# Patient Record
Sex: MALE | Race: WHITE | NOT HISPANIC OR LATINO | Employment: OTHER | URBAN - METROPOLITAN AREA
[De-identification: names, ages, dates, MRNs, and addresses within clinical notes are randomized per-mention and may not be internally consistent; named-entity substitution may affect disease eponyms.]

---

## 2024-07-09 ENCOUNTER — APPOINTMENT (OUTPATIENT)
Dept: ULTRASOUND IMAGING | Facility: CLINIC | Age: 64
End: 2024-07-09
Attending: EMERGENCY MEDICINE
Payer: MEDICARE

## 2024-07-09 ENCOUNTER — HOSPITAL ENCOUNTER (OUTPATIENT)
Facility: CLINIC | Age: 64
Setting detail: OBSERVATION
Discharge: HOME | End: 2024-07-11
Attending: EMERGENCY MEDICINE | Admitting: INTERNAL MEDICINE
Payer: MEDICARE

## 2024-07-09 ENCOUNTER — APPOINTMENT (OUTPATIENT)
Dept: MRI IMAGING | Facility: CLINIC | Age: 64
End: 2024-07-09
Attending: PHYSICIAN ASSISTANT
Payer: MEDICARE

## 2024-07-09 ENCOUNTER — APPOINTMENT (OUTPATIENT)
Dept: GENERAL RADIOLOGY | Facility: CLINIC | Age: 64
End: 2024-07-09
Attending: EMERGENCY MEDICINE
Payer: MEDICARE

## 2024-07-09 DIAGNOSIS — E11.69 TYPE 2 DIABETES MELLITUS WITH OSTEOMYELITIS (H): Primary | ICD-10-CM

## 2024-07-09 DIAGNOSIS — N18.6 TYPE 2 DIABETES MELLITUS WITH ESRD (END-STAGE RENAL DISEASE) (H): ICD-10-CM

## 2024-07-09 DIAGNOSIS — M86.9 OSTEOMYELITIS OF LEFT FOOT, UNSPECIFIED TYPE (H): ICD-10-CM

## 2024-07-09 DIAGNOSIS — Z99.2 DEPENDENCE ON RENAL DIALYSIS (H): ICD-10-CM

## 2024-07-09 DIAGNOSIS — L97.529 DIABETIC ULCER OF TOE OF LEFT FOOT ASSOCIATED WITH TYPE 2 DIABETES MELLITUS, UNSPECIFIED ULCER STAGE (H): ICD-10-CM

## 2024-07-09 DIAGNOSIS — E11.621 DIABETIC ULCER OF TOE OF LEFT FOOT ASSOCIATED WITH TYPE 2 DIABETES MELLITUS, UNSPECIFIED ULCER STAGE (H): ICD-10-CM

## 2024-07-09 DIAGNOSIS — M86.9 TYPE 2 DIABETES MELLITUS WITH OSTEOMYELITIS (H): Primary | ICD-10-CM

## 2024-07-09 DIAGNOSIS — E11.22 TYPE 2 DIABETES MELLITUS WITH ESRD (END-STAGE RENAL DISEASE) (H): ICD-10-CM

## 2024-07-09 LAB
ANION GAP SERPL CALCULATED.3IONS-SCNC: 13 MMOL/L (ref 7–15)
BASOPHILS # BLD AUTO: 0 10E3/UL (ref 0–0.2)
BASOPHILS NFR BLD AUTO: 0 %
BUN SERPL-MCNC: 47.1 MG/DL (ref 8–23)
CALCIUM SERPL-MCNC: 8 MG/DL (ref 8.8–10.2)
CHLORIDE SERPL-SCNC: 97 MMOL/L (ref 98–107)
CREAT SERPL-MCNC: 7.62 MG/DL (ref 0.67–1.17)
CRP SERPL-MCNC: 18.8 MG/L
DEPRECATED HCO3 PLAS-SCNC: 26 MMOL/L (ref 22–29)
EGFRCR SERPLBLD CKD-EPI 2021: 7 ML/MIN/1.73M2
EOSINOPHIL # BLD AUTO: 0.7 10E3/UL (ref 0–0.7)
EOSINOPHIL NFR BLD AUTO: 9 %
ERYTHROCYTE [DISTWIDTH] IN BLOOD BY AUTOMATED COUNT: 15.1 % (ref 10–15)
ERYTHROCYTE [SEDIMENTATION RATE] IN BLOOD BY WESTERGREN METHOD: 58 MM/HR (ref 0–20)
GLUCOSE BLDC GLUCOMTR-MCNC: 100 MG/DL (ref 70–99)
GLUCOSE BLDC GLUCOMTR-MCNC: 106 MG/DL (ref 70–99)
GLUCOSE BLDC GLUCOMTR-MCNC: 134 MG/DL (ref 70–99)
GLUCOSE BLDC GLUCOMTR-MCNC: 171 MG/DL (ref 70–99)
GLUCOSE BLDC GLUCOMTR-MCNC: 55 MG/DL (ref 70–99)
GLUCOSE BLDC GLUCOMTR-MCNC: 56 MG/DL (ref 70–99)
GLUCOSE BLDC GLUCOMTR-MCNC: 62 MG/DL (ref 70–99)
GLUCOSE BLDC GLUCOMTR-MCNC: 64 MG/DL (ref 70–99)
GLUCOSE SERPL-MCNC: 55 MG/DL (ref 70–99)
HBA1C MFR BLD: 5.9 %
HCT VFR BLD AUTO: 36.2 % (ref 40–53)
HGB BLD-MCNC: 12.3 G/DL (ref 13.3–17.7)
HOLD SPECIMEN: NORMAL
IMM GRANULOCYTES # BLD: 0 10E3/UL
IMM GRANULOCYTES NFR BLD: 0 %
INR PPP: 3.29 (ref 0.85–1.15)
LYMPHOCYTES # BLD AUTO: 0.9 10E3/UL (ref 0.8–5.3)
LYMPHOCYTES NFR BLD AUTO: 13 %
MCH RBC QN AUTO: 33.6 PG (ref 26.5–33)
MCHC RBC AUTO-ENTMCNC: 34 G/DL (ref 31.5–36.5)
MCV RBC AUTO: 99 FL (ref 78–100)
MONOCYTES # BLD AUTO: 0.6 10E3/UL (ref 0–1.3)
MONOCYTES NFR BLD AUTO: 9 %
NEUTROPHILS # BLD AUTO: 4.8 10E3/UL (ref 1.6–8.3)
NEUTROPHILS NFR BLD AUTO: 69 %
NRBC # BLD AUTO: 0 10E3/UL
NRBC BLD AUTO-RTO: 0 /100
PLATELET # BLD AUTO: 256 10E3/UL (ref 150–450)
POTASSIUM SERPL-SCNC: 4.5 MMOL/L (ref 3.4–5.3)
RBC # BLD AUTO: 3.66 10E6/UL (ref 4.4–5.9)
SODIUM SERPL-SCNC: 136 MMOL/L (ref 135–145)
WBC # BLD AUTO: 7 10E3/UL (ref 4–11)

## 2024-07-09 PROCEDURE — 85652 RBC SED RATE AUTOMATED: CPT | Performed by: EMERGENCY MEDICINE

## 2024-07-09 PROCEDURE — 96376 TX/PRO/DX INJ SAME DRUG ADON: CPT

## 2024-07-09 PROCEDURE — 36415 COLL VENOUS BLD VENIPUNCTURE: CPT | Performed by: PHYSICIAN ASSISTANT

## 2024-07-09 PROCEDURE — 82962 GLUCOSE BLOOD TEST: CPT

## 2024-07-09 PROCEDURE — 999N000090 HC STATISTIC LEVEL 2 EST PATIENT

## 2024-07-09 PROCEDURE — 258N000003 HC RX IP 258 OP 636: Performed by: EMERGENCY MEDICINE

## 2024-07-09 PROCEDURE — G0257 UNSCHED DIALYSIS ESRD PT HOS: HCPCS

## 2024-07-09 PROCEDURE — 96375 TX/PRO/DX INJ NEW DRUG ADDON: CPT | Mod: 59 | Performed by: EMERGENCY MEDICINE

## 2024-07-09 PROCEDURE — 93926 LOWER EXTREMITY STUDY: CPT | Mod: LT

## 2024-07-09 PROCEDURE — 73630 X-RAY EXAM OF FOOT: CPT | Mod: 26 | Performed by: RADIOLOGY

## 2024-07-09 PROCEDURE — 87040 BLOOD CULTURE FOR BACTERIA: CPT | Performed by: EMERGENCY MEDICINE

## 2024-07-09 PROCEDURE — 120N000002 HC R&B MED SURG/OB UMMC

## 2024-07-09 PROCEDURE — 96361 HYDRATE IV INFUSION ADD-ON: CPT | Performed by: EMERGENCY MEDICINE

## 2024-07-09 PROCEDURE — 96366 THER/PROPH/DIAG IV INF ADDON: CPT

## 2024-07-09 PROCEDURE — 250N000011 HC RX IP 250 OP 636: Performed by: INTERNAL MEDICINE

## 2024-07-09 PROCEDURE — 86140 C-REACTIVE PROTEIN: CPT | Performed by: EMERGENCY MEDICINE

## 2024-07-09 PROCEDURE — 258N000001 HC RX 258: Performed by: PHYSICIAN ASSISTANT

## 2024-07-09 PROCEDURE — 250N000011 HC RX IP 250 OP 636: Performed by: PHYSICIAN ASSISTANT

## 2024-07-09 PROCEDURE — 85025 COMPLETE CBC W/AUTO DIFF WBC: CPT | Performed by: EMERGENCY MEDICINE

## 2024-07-09 PROCEDURE — 87040 BLOOD CULTURE FOR BACTERIA: CPT | Performed by: PHYSICIAN ASSISTANT

## 2024-07-09 PROCEDURE — 258N000003 HC RX IP 258 OP 636: Performed by: PHYSICIAN ASSISTANT

## 2024-07-09 PROCEDURE — 99285 EMERGENCY DEPT VISIT HI MDM: CPT | Performed by: EMERGENCY MEDICINE

## 2024-07-09 PROCEDURE — 80048 BASIC METABOLIC PNL TOTAL CA: CPT | Performed by: EMERGENCY MEDICINE

## 2024-07-09 PROCEDURE — 272N000004 HC RX 272

## 2024-07-09 PROCEDURE — 96365 THER/PROPH/DIAG IV INF INIT: CPT | Mod: 59

## 2024-07-09 PROCEDURE — 96376 TX/PRO/DX INJ SAME DRUG ADON: CPT | Mod: 59 | Performed by: EMERGENCY MEDICINE

## 2024-07-09 PROCEDURE — 99223 1ST HOSP IP/OBS HIGH 75: CPT | Performed by: PHYSICIAN ASSISTANT

## 2024-07-09 PROCEDURE — 73630 X-RAY EXAM OF FOOT: CPT | Mod: LT

## 2024-07-09 PROCEDURE — 96368 THER/DIAG CONCURRENT INF: CPT

## 2024-07-09 PROCEDURE — 85610 PROTHROMBIN TIME: CPT | Performed by: PHYSICIAN ASSISTANT

## 2024-07-09 PROCEDURE — 93926 LOWER EXTREMITY STUDY: CPT | Mod: 26 | Performed by: RADIOLOGY

## 2024-07-09 PROCEDURE — 96374 THER/PROPH/DIAG INJ IV PUSH: CPT | Performed by: EMERGENCY MEDICINE

## 2024-07-09 PROCEDURE — 99223 1ST HOSP IP/OBS HIGH 75: CPT | Mod: AI | Performed by: INTERNAL MEDICINE

## 2024-07-09 PROCEDURE — 36415 COLL VENOUS BLD VENIPUNCTURE: CPT | Performed by: EMERGENCY MEDICINE

## 2024-07-09 PROCEDURE — 258N000001 HC RX 258: Performed by: EMERGENCY MEDICINE

## 2024-07-09 PROCEDURE — 83036 HEMOGLOBIN GLYCOSYLATED A1C: CPT | Performed by: PHYSICIAN ASSISTANT

## 2024-07-09 PROCEDURE — 250N000013 HC RX MED GY IP 250 OP 250 PS 637: Performed by: PHYSICIAN ASSISTANT

## 2024-07-09 PROCEDURE — 99285 EMERGENCY DEPT VISIT HI MDM: CPT | Mod: 25 | Performed by: EMERGENCY MEDICINE

## 2024-07-09 PROCEDURE — 250N000011 HC RX IP 250 OP 636: Performed by: EMERGENCY MEDICINE

## 2024-07-09 RX ORDER — PANTOPRAZOLE SODIUM 40 MG/1
40 TABLET, DELAYED RELEASE ORAL
Status: DISCONTINUED | OUTPATIENT
Start: 2024-07-10 | End: 2024-07-11 | Stop reason: HOSPADM

## 2024-07-09 RX ORDER — CARVEDILOL 3.12 MG/1
12.5 TABLET ORAL 2 TIMES DAILY WITH MEALS
Status: DISCONTINUED | OUTPATIENT
Start: 2024-07-09 | End: 2024-07-11 | Stop reason: HOSPADM

## 2024-07-09 RX ORDER — WARFARIN SODIUM 1 MG/1
5 TABLET ORAL
COMMUNITY
Start: 2024-05-15

## 2024-07-09 RX ORDER — CINACALCET 30 MG/1
30 TABLET, FILM COATED ORAL
COMMUNITY
Start: 2023-02-15 | End: 2024-07-09

## 2024-07-09 RX ORDER — ATORVASTATIN CALCIUM 40 MG/1
40 TABLET, FILM COATED ORAL DAILY
COMMUNITY
Start: 2023-01-04

## 2024-07-09 RX ORDER — TORSEMIDE 100 MG/1
100 TABLET ORAL EVERY MORNING
COMMUNITY
Start: 2022-10-18

## 2024-07-09 RX ORDER — LIDOCAINE 40 MG/G
CREAM TOPICAL
Status: DISCONTINUED | OUTPATIENT
Start: 2024-07-09 | End: 2024-07-11 | Stop reason: HOSPADM

## 2024-07-09 RX ORDER — TORSEMIDE 100 MG/1
100 TABLET ORAL EVERY MORNING
Status: DISCONTINUED | OUTPATIENT
Start: 2024-07-09 | End: 2024-07-11 | Stop reason: HOSPADM

## 2024-07-09 RX ORDER — CALCITRIOL 0.25 UG/1
0.25 CAPSULE, LIQUID FILLED ORAL
Status: DISCONTINUED | OUTPATIENT
Start: 2024-07-10 | End: 2024-07-11 | Stop reason: HOSPADM

## 2024-07-09 RX ORDER — DEXTROSE MONOHYDRATE 25 G/50ML
25 INJECTION, SOLUTION INTRAVENOUS ONCE
Status: COMPLETED | OUTPATIENT
Start: 2024-07-09 | End: 2024-07-09

## 2024-07-09 RX ORDER — GENTAMICIN SULFATE 1 MG/G
CREAM TOPICAL DAILY
COMMUNITY
Start: 2023-11-28

## 2024-07-09 RX ORDER — ATORVASTATIN CALCIUM 40 MG/1
40 TABLET, FILM COATED ORAL DAILY
Status: DISCONTINUED | OUTPATIENT
Start: 2024-07-10 | End: 2024-07-11 | Stop reason: HOSPADM

## 2024-07-09 RX ORDER — AMOXICILLIN 250 MG
2 CAPSULE ORAL 2 TIMES DAILY PRN
Status: DISCONTINUED | OUTPATIENT
Start: 2024-07-09 | End: 2024-07-11 | Stop reason: HOSPADM

## 2024-07-09 RX ORDER — GENTAMICIN SULFATE 1 MG/G
CREAM TOPICAL
Status: DISCONTINUED | OUTPATIENT
Start: 2024-07-09 | End: 2024-07-11 | Stop reason: HOSPADM

## 2024-07-09 RX ORDER — GENTAMICIN SULFATE 1 MG/G
CREAM TOPICAL DAILY PRN
Status: DISCONTINUED | OUTPATIENT
Start: 2024-07-09 | End: 2024-07-10

## 2024-07-09 RX ORDER — DEXTROSE MONOHYDRATE 25 G/50ML
25-50 INJECTION, SOLUTION INTRAVENOUS
Status: DISCONTINUED | OUTPATIENT
Start: 2024-07-09 | End: 2024-07-11 | Stop reason: HOSPADM

## 2024-07-09 RX ORDER — PANTOPRAZOLE SODIUM 40 MG/1
40 TABLET, DELAYED RELEASE ORAL DAILY
COMMUNITY

## 2024-07-09 RX ORDER — NICOTINE POLACRILEX 4 MG
15-30 LOZENGE BUCCAL
Status: DISCONTINUED | OUTPATIENT
Start: 2024-07-09 | End: 2024-07-11 | Stop reason: HOSPADM

## 2024-07-09 RX ORDER — CINACALCET 30 MG/1
60 TABLET, FILM COATED ORAL DAILY
Status: ON HOLD | COMMUNITY
Start: 2023-02-14 | End: 2024-07-10

## 2024-07-09 RX ORDER — ALLOPURINOL 100 MG/1
200 TABLET ORAL EVERY EVENING
COMMUNITY
Start: 2023-03-01

## 2024-07-09 RX ORDER — ACETAMINOPHEN 325 MG/1
325-650 TABLET ORAL EVERY 4 HOURS PRN
COMMUNITY

## 2024-07-09 RX ORDER — CALCIUM CARBONATE 500 MG/1
1000 TABLET, CHEWABLE ORAL 4 TIMES DAILY PRN
Status: DISCONTINUED | OUTPATIENT
Start: 2024-07-09 | End: 2024-07-11 | Stop reason: HOSPADM

## 2024-07-09 RX ORDER — ALLOPURINOL 100 MG/1
200 TABLET ORAL EVERY EVENING
Status: DISCONTINUED | OUTPATIENT
Start: 2024-07-09 | End: 2024-07-11 | Stop reason: HOSPADM

## 2024-07-09 RX ORDER — CALCITRIOL 0.25 UG/1
0.25 CAPSULE, LIQUID FILLED ORAL
COMMUNITY
Start: 2024-02-27

## 2024-07-09 RX ORDER — CLOPIDOGREL BISULFATE 75 MG
75 TABLET ORAL EVERY EVENING
COMMUNITY
Start: 2024-05-12

## 2024-07-09 RX ORDER — AMOXICILLIN 250 MG
1 CAPSULE ORAL 2 TIMES DAILY PRN
Status: DISCONTINUED | OUTPATIENT
Start: 2024-07-09 | End: 2024-07-11 | Stop reason: HOSPADM

## 2024-07-09 RX ORDER — NICOTINE POLACRILEX 4 MG
15-30 LOZENGE BUCCAL
Status: DISCONTINUED | OUTPATIENT
Start: 2024-07-09 | End: 2024-07-09

## 2024-07-09 RX ORDER — ICODEXTRIN, SODIUM CHLORIDE, SODIUM LACTATE, CALCIUM CHLORIDE, MAGNESIUM CHLORIDE 7.5; 535; 448; 25.7; 5.08 G/100ML; MG/100ML; MG/100ML; MG/100ML; MG/100ML
1200 INJECTION, SOLUTION INTRAPERITONEAL
Status: DISCONTINUED | OUTPATIENT
Start: 2024-07-09 | End: 2024-07-10

## 2024-07-09 RX ORDER — DEXTROSE MONOHYDRATE 25 G/50ML
25-50 INJECTION, SOLUTION INTRAVENOUS
Status: DISCONTINUED | OUTPATIENT
Start: 2024-07-09 | End: 2024-07-09

## 2024-07-09 RX ORDER — PIPERACILLIN SODIUM, TAZOBACTAM SODIUM 2; .25 G/10ML; G/10ML
2.25 INJECTION, POWDER, LYOPHILIZED, FOR SOLUTION INTRAVENOUS ONCE
Status: DISCONTINUED | OUTPATIENT
Start: 2024-07-09 | End: 2024-07-09

## 2024-07-09 RX ORDER — DEXTROSE MONOHYDRATE 25 G/50ML
INJECTION, SOLUTION INTRAVENOUS
Status: DISCONTINUED
Start: 2024-07-09 | End: 2024-07-09 | Stop reason: HOSPADM

## 2024-07-09 RX ORDER — SEVELAMER CARBONATE 800 MG/1
800 TABLET, FILM COATED ORAL
Status: DISCONTINUED | OUTPATIENT
Start: 2024-07-09 | End: 2024-07-11 | Stop reason: HOSPADM

## 2024-07-09 RX ORDER — CARVEDILOL 25 MG/1
12.5 TABLET ORAL 2 TIMES DAILY WITH MEALS
COMMUNITY
Start: 2023-09-11

## 2024-07-09 RX ORDER — CINACALCET 30 MG/1
30 TABLET, FILM COATED ORAL DAILY
Status: DISCONTINUED | OUTPATIENT
Start: 2024-07-09 | End: 2024-07-10

## 2024-07-09 RX ORDER — PIPERACILLIN SODIUM, TAZOBACTAM SODIUM 2; .25 G/10ML; G/10ML
2.25 INJECTION, POWDER, LYOPHILIZED, FOR SOLUTION INTRAVENOUS EVERY 8 HOURS
Status: DISCONTINUED | OUTPATIENT
Start: 2024-07-09 | End: 2024-07-10

## 2024-07-09 RX ORDER — ACETAMINOPHEN 325 MG/1
650 TABLET ORAL EVERY 4 HOURS PRN
Status: DISCONTINUED | OUTPATIENT
Start: 2024-07-09 | End: 2024-07-11 | Stop reason: HOSPADM

## 2024-07-09 RX ORDER — CLOPIDOGREL BISULFATE 75 MG/1
75 TABLET ORAL EVERY EVENING
Status: DISCONTINUED | OUTPATIENT
Start: 2024-07-09 | End: 2024-07-11 | Stop reason: HOSPADM

## 2024-07-09 RX ORDER — SACUBITRIL AND VALSARTAN 49; 51 MG/1; MG/1
1 TABLET, FILM COATED ORAL 2 TIMES DAILY
COMMUNITY
Start: 2023-03-16

## 2024-07-09 RX ADMIN — PIPERACILLIN AND TAZOBACTAM 2.25 G: 2; .25 INJECTION, POWDER, LYOPHILIZED, FOR SOLUTION INTRAVENOUS; PARENTERAL at 13:11

## 2024-07-09 RX ADMIN — ALLOPURINOL 200 MG: 100 TABLET ORAL at 20:10

## 2024-07-09 RX ADMIN — SEVELAMER CARBONATE 800 MG: 800 TABLET, FILM COATED ORAL at 15:30

## 2024-07-09 RX ADMIN — VANCOMYCIN HYDROCHLORIDE 1500 MG: 10 INJECTION, POWDER, LYOPHILIZED, FOR SOLUTION INTRAVENOUS at 13:33

## 2024-07-09 RX ADMIN — ICODEXTRIN, SODIUM CHLORIDE, SODIUM LACTATE, CALCIUM CHLORIDE, MAGNESIUM CHLORIDE 1200 ML: 7.5; 535; 448; 25.7; 5.08 INJECTION, SOLUTION INTRAPERITONEAL at 22:00

## 2024-07-09 RX ADMIN — DEXTROSE MONOHYDRATE 25 ML: 25 INJECTION, SOLUTION INTRAVENOUS at 09:19

## 2024-07-09 RX ADMIN — TORSEMIDE 100 MG: 100 TABLET ORAL at 15:29

## 2024-07-09 RX ADMIN — SODIUM CHLORIDE, SODIUM LACTATE, CALCIUM CHLORIDE, MAGNESIUM CHLORIDE AND DEXTROSE: 2.5; 538; 448; 18.3; 5.08 INJECTION, SOLUTION INTRAPERITONEAL at 21:58

## 2024-07-09 RX ADMIN — SODIUM CHLORIDE 500 ML: 9 INJECTION, SOLUTION INTRAVENOUS at 09:56

## 2024-07-09 RX ADMIN — CINACALCET 30 MG: 30 TABLET, FILM COATED ORAL at 15:30

## 2024-07-09 RX ADMIN — DEXTROSE MONOHYDRATE 25 ML: 25 INJECTION, SOLUTION INTRAVENOUS at 22:47

## 2024-07-09 RX ADMIN — PIPERACILLIN AND TAZOBACTAM 2.25 G: 2; .25 INJECTION, POWDER, FOR SOLUTION INTRAVENOUS at 20:14

## 2024-07-09 RX ADMIN — SODIUM CHLORIDE, SODIUM LACTATE, CALCIUM CHLORIDE, MAGNESIUM CHLORIDE AND DEXTROSE: 2.5; 538; 448; 18.3; 5.08 INJECTION, SOLUTION INTRAPERITONEAL at 22:00

## 2024-07-09 RX ADMIN — SACUBITRIL AND VALSARTAN 1 TABLET: 49; 51 TABLET, FILM COATED ORAL at 20:22

## 2024-07-09 RX ADMIN — SACUBITRIL AND VALSARTAN 1 TABLET: 49; 51 TABLET, FILM COATED ORAL at 15:29

## 2024-07-09 RX ADMIN — DEXTROSE MONOHYDRATE 25 ML: 25 INJECTION, SOLUTION INTRAVENOUS at 11:55

## 2024-07-09 RX ADMIN — CLOPIDOGREL BISULFATE 75 MG: 75 TABLET ORAL at 20:10

## 2024-07-09 RX ADMIN — CARVEDILOL 12.5 MG: 12.5 TABLET, FILM COATED ORAL at 15:29

## 2024-07-09 ASSESSMENT — ACTIVITIES OF DAILY LIVING (ADL)
ADLS_ACUITY_SCORE: 36
ADLS_ACUITY_SCORE: 36
ADLS_ACUITY_SCORE: 33
ADLS_ACUITY_SCORE: 36
ADLS_ACUITY_SCORE: 35
ADLS_ACUITY_SCORE: 35
ADLS_ACUITY_SCORE: 36
ADLS_ACUITY_SCORE: 35
ADLS_ACUITY_SCORE: 36
ADLS_ACUITY_SCORE: 36
ADLS_ACUITY_SCORE: 35
ADLS_ACUITY_SCORE: 35
ADLS_ACUITY_SCORE: 36
ADLS_ACUITY_SCORE: 36
ADLS_ACUITY_SCORE: 35

## 2024-07-09 ASSESSMENT — COLUMBIA-SUICIDE SEVERITY RATING SCALE - C-SSRS
1. IN THE PAST MONTH, HAVE YOU WISHED YOU WERE DEAD OR WISHED YOU COULD GO TO SLEEP AND NOT WAKE UP?: NO
2. HAVE YOU ACTUALLY HAD ANY THOUGHTS OF KILLING YOURSELF IN THE PAST MONTH?: NO
6. HAVE YOU EVER DONE ANYTHING, STARTED TO DO ANYTHING, OR PREPARED TO DO ANYTHING TO END YOUR LIFE?: NO

## 2024-07-09 NOTE — MEDICATION SCRIBE - ADMISSION MEDICATION HISTORY
Medication Scribe Admission Medication History    Admission medication history is complete. The information provided in this note is only as accurate as the sources available at the time of the update.    Information Source(s): Clinic records and CareEverywhere/SureScripts via in-person    Pertinent Information:   Patient had no medications listed in the [employer] system. So writer built PTA list form information found in Chart Review Medication Section Current Meds Only section. This information was complied by prescribing NILSA after physical evaluation interview.    Patient is prescribed warfarin 5 mg Instructions are- 5 mg every Mom Wed Fri and 7.5 mg all other days. Weekly warfarin total 45 mg.  INR used for dosing 2.7 (7/8/2024) Next INR check is 7/22/2024. This information is from Chart Review (Encounters 07/08/2024) Telephone note, Anticoagulation Clinic.     The below information is from Care Everywhere Outside Records Activity medication was ordered 07/09/2024    Patient has been prescribed   insulin aspart (NOVOLOG PEN) 100 UNIT/ML pen     Inject 1-7 Units subcutaneously 3 times daily (before meals)   1-7 Units, Subcutaneous, 3 TIMES DAILY BEFORE MEALS, First dose on Tue 7/9/24 at 1350  Correction Scale - MEDIUM INSULIN RESISTANCE DOSING Do Not give Correction Insulin if Pre-Meal BG less than 140. For Pre-Meal  - 189 give 1 unit. For Pre-Meal  - 239 give 2 units. For Pre-Meal  - 289 give 3 units. For Pre-Meal  - 339 give 4 units. For Pre-Meal - 389 give 5 units. For Pre-Meal -439 give 6 units For Pre-Meal BG greater than or equal to 440 give 7 units. To be given with prandial insulin, and based on pre-meal blood glucose. Administering insulin within 5 minutes of the start of the meal is ideal. Administer insulin no more than 30 minutes after the start of the meal, unless directed otherwise by provider. Notify provider if glucose greater than or equal to 350 mg/dL  after administration of correction dose.     Patient also prescribed  insulin aspart (NOVOLOG PEN) 100 UNIT/ML pen     Inject 1-5 Units subcutaneously at bedtime    1-5 Units, Subcutaneous, AT BEDTIME, First dose on Tue 7/9/24 at 2200  MEDIUM INSULIN RESISTANCE DOSING Do Not give Bedtime Correction Insulin if BG less than 200. For  - 249 give 1 units. For  - 299 give 2 units. For  - 349 give 3 units. For  -399 give 4 units. For BG greater than or equal to 400 give 5 units. Notify provider if glucose greater than or equal to 350 mg/dL after administration of correction dose.       Changes made to PTA medication list:  Added: Every medication on the PTA Med List below  has been added from other sources Chart Review, Care Everywhere, and Clinic Records.   Deleted:cinacalcet (SENSIPAR) 30 MG tablet     Take 30 mg by mouth every 48 hours duplicate prescription   Changed: None    Allergies reviewed with patient and updates made in EHR: yes    Medication History Completed By: Tia Dasilva 7/9/2024 5:22 PM    Prior to Admission medications    Medication Sig Last Dose Taking? Auth Provider Long Term End Date   acetaminophen (TYLENOL) 325 MG tablet Take 325-650 mg by mouth every 4 hours as needed Unknown at as needed Yes Reported, Patient     allopurinol (ZYLOPRIM) 100 MG tablet Take 200 mg by mouth every evening 7/8/2024 at evening Yes Reported, Patient     atorvastatin (LIPITOR) 40 MG tablet Take 40 mg by mouth daily 7/8/2024 at am Yes Reported, Patient     calcitRIOL (ROCALTROL) 0.25 MCG capsule Take 0.25 mcg by mouth Every Mon, Wed, Fri Morning 7/8/2024 at am Yes Reported, Patient     calcium carbonate antacid 1000 MG CHEW Take 1,000 mg by mouth 4 times daily as needed Unknown at as needed Yes Reported, Patient     carvedilol (COREG) 25 MG tablet Take 12.5 mg by mouth 2 times daily (with meals) 7/8/2024 at am/bebo Yes Reported, Patient     cinacalcet (SENSIPAR) 30 MG tablet Take 1 tablet by  mouth daily 7/8/2024 at am Yes Reported, Patient     gentamicin (GARAMYCIN) 0.1 % external cream Apply topically daily 7/8/2024 at am Yes Reported, Patient     insulin aspart (NOVOLOG PEN) 100 UNIT/ML pen Inject 1-7 Units subcutaneously 3 times daily (before meals) 7/8/2024 at am/pm/bebo Yes Reported, Patient     insulin aspart (NOVOLOG PEN) 100 UNIT/ML pen Inject 1-5 Units subcutaneously at bedtime 7/8/2024 at bedtime Yes Reported, Patient     melatonin 5 MG tablet Take 5 mg by mouth at bedtime 7/8/2024 at bedtime Yes Reported, Patient     pantoprazole (PROTONIX) 40 MG EC tablet Take 40 mg by mouth daily Every am before meals 7/8/2024 at am Yes Reported, Patient     PLAVIX 75 MG tablet Take 75 mg by mouth every evening 7/8/2024 at evening Yes Reported, Patient     sacubitril-valsartan (ENTRESTO) 49-51 MG per tablet Take 1 tablet by mouth 2 times daily 7/8/2024 at am/bebo Yes Reported, Patient     Sennosides-Docusate Sodium (SENOKOT S PO) Take 2 tablets by mouth 2 times daily as needed 7/8/2024 at am/bebo Yes Reported, Patient     SEVELAMER CARBONATE PO Take 800 mg by mouth 3 times daily (with meals) 7/8/2024 at am/pm/noc Yes Reported, Patient     torsemide (DEMADEX) 100 MG tablet Take 100 mg by mouth every morning 7/8/2024 at am Yes Reported, Patient     warfarin ANTICOAGULANT (COUMADIN) 1 MG tablet Take 5 mg by mouth Every Mon, Wed, Fri Morning 7.5 mg all other days 7/8/2024 at am/5 mg Yes Reported, Patient

## 2024-07-09 NOTE — PHARMACY-VANCOMYCIN DOSING SERVICE
"Pharmacy Vancomycin Initial Note  Date of Service 2024  Patient's  1960  63 year old, male    Indication: Osteomyelitis    Current estimated CrCl = CrCl cannot be calculated (Unknown ideal weight.).    Creatinine for last 3 days  2024:  9:14 AM Creatinine 7.62 mg/dL    Recent Vancomycin Level(s) for last 3 days  No results found for requested labs within last 3 days.      Vancomycin IV Administrations (past 72 hours)        No vancomycin orders with administrations in past 72 hours.                    Nephrotoxins and other renal medications (From now, onward)      Start     Dose/Rate Route Frequency Ordered Stop    24 1255  vancomycin (VANCOCIN) 1,500 mg in 0.9% NaCl 250 mL intermittent infusion         1,500 mg  over 90 Minutes Intravenous ONCE 24 1253      24 1240  piperacillin-tazobactam (ZOSYN) 2.25 g vial to attach to  ml bag        Note to Pharmacy: For [facility], [facility] and [facility]: For Zosyn-naive patients, use the \"Zosyn initial dose + extended infusion\" order panel.    2.25 g  over 30 Minutes Intravenous ONCE 24 1237              Contrast Orders - past 72 hours (72h ago, onward)      None            Plan:  Start vancomycin  1500 mg IV once and will dose future doses intermittently based on levels.  Vancomycin monitoring method: Will draw random level in 2 days to assess supplemental dosing because patient utilizes peritoneal dialysis   Vancomycin therapeutic monitoring goal: 15-20 mg/L  Pharmacy will check vancomycin levels as appropriate in 2 days.    Serum creatinine levels will be ordered  N/A. Patient is receiving peritoneal dialysis. Will review for residual urine output .      Jesse Gerard, PharmD   "

## 2024-07-09 NOTE — ED TRIAGE NOTES
Pt arrives ambulatory with cane c/o L toe pain. L greater toe appears to have slight necrosis and bleeding. L toes 2-5 are reddened, warm to touch with minimal sensation. Pain is 3/10 to affected foot.     Hx of emergency vascular surgery to RLE 5/6 but didn't find it necessary to operate on patients LLE at that time. Pt was started warfarin at that time d/t 6 months of eliquis noncompliance related to costs of med.     Triage Assessment (Adult)       Row Name 07/09/24 0845          Triage Assessment    Airway WDL WDL        Respiratory WDL    Respiratory WDL WDL        Skin Circulation/Temperature WDL    Skin Circulation/Temperature WDL WDL        Cardiac WDL    Cardiac WDL X     Cardiac Rhythm ST        Peripheral/Neurovascular WDL    Peripheral Neurovascular WDL X;capillary refill     Capillary Refill, LLE greater than 3 secs        Cognitive/Neuro/Behavioral WDL    Cognitive/Neuro/Behavioral WDL WDL

## 2024-07-09 NOTE — CONSULTS
Nephrology Initial Consult  July 9, 2024      Arnulfo Pritchett MRN:[id number] YOB: 1960  Date of Admission:7/9/2024  Primary care provider: No Ref-Primary, Physician  Requesting physician: Pj Johnson MD    ASSESSMENT AND RECOMMENDATIONS:   Arnulfo Pritchett is a 64 yo male with  PMH significant for GERD, ESRD on PD, HFrEF, IDDM, DM peripheral neuropathy, CAD s/p stent placement x 3 (last 2018), TALI, HLD, and RLE PAD s/p balloon angio and stenting of SFA/popliteal arteries 5/2024, admitted to [facility] after presenting to ED with 1 week hx or worsening L great toe wound with imaging concerning for OM. Nephrology consulted for assistance in the management of peritoneal dialysis.    # ESRD   On HD initially for  two and half year and transitioned to PD since 11/23. Followed at [facility].  Home prescription : Four cycle with FV 2500ml , 2.5% PD solution  over 9 hours. FF with icodextran 1200 ml. She does drain the FF at 1 pm manually. Reported KT/V of 2.7 recently.  - Continue PD tonight with home prescription  - [place] PD catheter at all time  - Apply gentamicin ointment alto the exit site daily   - Nystatin 500,000 unit po four times a day for prevention of fungal peritonitis till seven days after completion of systemic antibiotic    # Anemia of renal diseases  HB 12.3, in target . Iron - replete as of 6/3/2024.  - No need of ANSHUL therapy     # MBD  Ca - 9.2, Phosph - 6.4  and  PTH 1174 on 6/3/2024  - Continue PTA  Renvela 800mg po tid with meal   - Continue PTA  Sensipar 60 mg po daily  and Calcitriol 0.25 mcg po daily     # Concern of Osteomyelitis of the left great toe    - Started on broad spectrum abxs    - Agree with orthopedic and ID consultation.     Recommendations were communicated to primary team via note    Seen and discussed with Dr. Ksenia Seals MD  Division of Renal Disease and Hypertension  [facility]  ailyn  [email or web address] [employer]        REASON FOR CONSULT:  ESRD on Peritoneal dialysis    HISTORY OF PRESENT ILLNESS:  Admitting provider and nursing notes reviewed  Arnulfo Pritchett is a 64 yo male with  PMH significant for GERD, ESRD on PD, HFrEF, IDDM, DM peripheral neuropathy, CAD s/p stent placement x 3 (last 2018), TALI, HLD, and RLE PAD s/p balloon angio and stenting of SFA/popliteal arteries 5/2024, admitted to [facility] after presenting to ED with 1 week hx or worsening L great toe wound with imaging concerning for OM.   ESRD is due to DKD and parenchymal loss from right nephrectomy for RCC. Initially he was on HD for two and half year then transitioned to PD since 11/2023. No complications thus far related to PD including peritonitis. He has significant residual kidney function with urine volume averaging a liter per day.     PAST MEDICAL HISTORY:  Reviewed with patient on 07/09/2024     Past Medical History:   Diagnosis Date    CAD (coronary artery disease)     Chronic systolic heart failure (H)     ESRD (end stage renal disease) on dialysis (H)     Gastroesophageal reflux disease without esophagitis     Gout     HLD (hyperlipidemia)     TALI (obstructive sleep apnea)     PAD (peripheral artery disease) (H24)     S/p RLE stenting of SFA/popliteal arteries    Type 2 diabetes mellitus with diabetic neuropathy (H)        No past surgical history on file.     MEDICATIONS:  PTA Meds  Prior to Admission medications    Not on File      Current Meds  Current Facility-Administered Medications   Medication Dose Route Frequency Provider Last Rate Last Admin    allopurinol (ZYLOPRIM) tablet 200 mg  200 mg Oral QPM Jaylon Moyer PA-C        [START ON 7/10/2024] atorvastatin (LIPITOR) tablet 40 mg  40 mg Oral Daily Jaylon Moyer PA-C        [START ON 7/10/2024] calcitRIOL (ROCALTROL) capsule 0.25 mcg  0.25 mcg Oral Q Mon Wed Fri AM Jaylon Moyer PA-C        carvedilol (COREG) tablet 12.5 mg  12.5 mg Oral BID w/meals Jaylon Moyer PA-C         cinacalcet (SENSIPAR) tablet 30 mg  30 mg Oral Daily Jaylon Moyer PA-C        gentamicin (GARAMYCIN) 0.1 % cream   Topical Q48H Jaylon Moyer PA-C        insulin aspart (NovoLOG) injection (RAPID ACTING)  1-7 Units Subcutaneous TID AC Jaylon Moyer PA-C        insulin aspart (NovoLOG) injection (RAPID ACTING)  1-5 Units Subcutaneous At Bedtime Jaylon Moyer PA-C        melatonin tablet 5 mg  5 mg Oral At Bedtime Jaylon Moyer PA-C        [START ON 7/10/2024] pantoprazole (PROTONIX) EC tablet 40 mg  40 mg Oral QAM AC Jaylon Moyer PA-C        piperacillin-tazobactam (ZOSYN) 2.25 g vial to attach to  ml bag  2.25 g Intravenous Q8H Pj Johnson MD        sacubitril-valsartan (ENTRESTO) 49-51 MG per tablet 1 tablet  1 tablet Oral BID Jaylon Moyer PA-C        sevelamer carbonate (RENVELA) tablet 800 mg  800 mg Oral TID w/meals Jaylon Moyer PA-C        sodium chloride (PF) 0.9% PF flush 3 mL  3 mL Intracatheter Q8H Jaylon Moyer PA-C        torsemide (DEMADEX) tablet 100 mg  100 mg Oral QAM Jaylon Moyer PA-C        vancomycin (VANCOCIN) 1,500 mg in 0.9% NaCl 250 mL intermittent infusion  1,500 mg Intravenous Once Jaylon Moyer PA-C   1,500 mg at 07/09/24 1333    vancomycin place jin - receiving intermittent dosing  1 each Intravenous See Admin Instructions Jaylon Moyer PA-C        Warfarin Dose Required Daily - Pharmacist Managed  1 each Oral See Admin Instructions Jaylon Moyer PA-C         Infusion Meds  Current Facility-Administered Medications   Medication Dose Route Frequency Provider Last Rate Last Admin    Patient is already receiving anticoagulation with heparin, enoxaparin (LOVENOX), warfarin (COUMADIN)  or other anticoagulant medication   Does not apply Continuous PRN Jaylon Moyer PA-C           ALLERGIES:    No Known Allergies    REVIEW OF SYSTEMS:  A comprehensive  of systems was negative except as noted above.    SOCIAL HISTORY:   Social History     Socioeconomic History    Marital status:      Spouse name: Not on file    Number of children: Not on file    Years of education: Not on file    Highest education level: Not on file   Occupational History    Not on file   Tobacco Use    Smoking status: Not on file    Smokeless tobacco: Not on file   Substance and Sexual Activity    Alcohol use: Not on file    Drug use: Not on file    Sexual activity: Not on file   Other Topics Concern    Not on file   Social History Narrative    Not on file     Social Determinants of Health     Financial Resource Strain: Low Risk  (11/24/2023)    [facility]    Overall Financial Resource Strain (CARDIA)     Difficulty of Paying Living Expenses: Not very hard   Food Insecurity: No Food Insecurity (5/7/2024)    Received from [facility]    Hunger Vital Sign     Worried About Running Out of Food in the Last Year: Never true     Ran Out of Food in the Last Year: Never true   Transportation Needs: No Transportation Needs (5/7/2024)    Received from [facility]    PRAPARE - Transportation     Lack of Transportation (Medical): No     Lack of Transportation (Non-Medical): No   Physical Activity: Not on file   Stress: Not on file   Social Connections: Not on file   Interpersonal Safety: Not At Risk (5/7/2024)    Received from [facility]    EH IP Custom IPV     Do you feel UNSAFE in any of your personal relationships with your family members or any other acquaintances?: No   Housing Stability: Low Risk  (5/7/2024)    Received from [facility]    Housing Stability Vital Sign     Unable to Pay for Housing in the Last Year: No     Number of Times Moved in the Last Year:  0     Homeless in the Last Year: No     Reviewed with patient       FAMILY MEDICAL HISTORY:   No family history on file.  Reviewed with patient     PHYSICAL EXAM:   Temp  Av.6  F (36.4  C)  Min: 97.6  F (36.4  C)  Max: 97.6  F (36.4  C)      Pulse  Av  Min: 106  Max: 106 Resp  Av  Min: 14  Max: 14  SpO2  Av %  Min: 98 %  Max: 98 %       BP (!) 150/111   Pulse 106   Temp 97.6  F (36.4  C) (Oral)   Resp 14   SpO2 98%    Date 24 07 - 07/10/24 0659   Shift 3476-4165 5550-2384 [id number] 24 Hour Total   INTAKE   IV Piggyback 500   500   Shift Total 500   500   OUTPUT   Shift Total       Weight (kg)          Admit       GENERAL APPEARANCE: not distress,   EYES: no  scleral icterus, pupils equal  Lymphatics: no cervical or supraclavicular LAD  Pulmonary: lungs clear to auscultation with equal breath sounds bilaterally, no clubbing  CV: regular rhythm, normal rate, no rub   - JVD -ve   - Edema -ve  GI: soft, non tender. PD cath exit site and tunnel appears clean.  MS: no evidence of inflammation in joints, no muscle tenderness  : no borrego  SKIN: LLE great toe erythema. Small scattered black areas all toes.  NEURO: face symmetric    LABS:   CMP  Recent Labs   Lab 24  1216 24  1143 24  0947 24  0914   NA  --   --   --  136   POTASSIUM  --   --   --  4.5   CHLORIDE  --   --   --  97*   CO2  --   --   --  26   ANIONGAP  --   --   --  13   * 64* 100* 55*   BUN  --   --   --  47.1*   CR  --   --   --  7.62*   GFRESTIMATED  --   --   --  7*   TERENCE  --   --   --  8.0*     CBC  Recent Labs   Lab 24   HGB 12.3*   WBC 7.0   RBC 3.66*   HCT 36.2*   MCV 99   MCH 33.6*   MCHC 34.0   RDW 15.1*        INR  Recent Labs   Lab 24   INR 3.29*     ABGNo lab results found in last 7 days.   URINE STUDIES  No lab results found.  No lab results found.  PTH  No lab results found.  IRON STUDIES  No lab results found.    IMAGING:  All imaging studies  reviewed by me.     Cally Seals MD

## 2024-07-09 NOTE — PROGRESS NOTES
PD reached out to nurse regarding dialysis. Does not believe there is issue with his indwell solution being held longer then normal. Will let next nurse let PD know if or not or when he is admitted to [facility].

## 2024-07-09 NOTE — ED PROVIDER NOTES
ED Provider Note  [facility]      History     Chief Complaint   Patient presents with    Circulatory Problem     HPI  Arnulfo Pritchett is a 63 year old male with history of peripheral arterial disease, right lower extremity ischemia requiring angio and stenting 5/6/24, ESRD on PD, HFrEF, T2DM, HDL, CAD, TALI, and atrial fibrillation who presents accompanied by his son Ervin for evaluation of L great toe blackening. Present problem began approximately 1 week ago when the patient noticed that a small area of the tip of his L great toe along the nail was blackened. He then noticed yesterday that this area was larger and was then newly accompanied by some bleeding from the side of the nail. He notes that he regularly gets his toenails clipped professionally, though he missed a session recently. He is concerned that an ingrown toenail may be contributing to present problem. He was seen for this recently at the [facility], where he had an evaluation including plain film imaging that showed no acute fracture or evidence of osteomyelitis. He was told that his toe was necrotic and discharged with a course of Augmentin, though he has not yet had the antibiotic prescription filled. He also reports that he has had redness of his other L toes that has been present for several weeks. He has had no change in ability to ambulate independently.    Notable PMH includes peripheral arterial disease and right lower extremity ischemia diagnosed 5/6/24, during which he had a RLE angio and recanalization of the superficial femoral artery/popliteal artery with balloon angio and stenting. The LLE was also evaluated and pt recalls being told that there were no significant arterial blockages in the LLE at that time. Prior to this episode, he was prescribed Eliquis though he was taking this medication inconsistently due to the prohibitive cost of this medication. Post-angio and stenting, he was  started on warfarin and plavix, which he has been taking regularly since then.    He has NKDA. He last ate a few eggs for breakfast this morning. He can recall no unusual events leading up to the onset of present symptoms. He reports no tobacco or EtOH use. He admits to occasional cannabis use.    Past Medical History  No past medical history on file.  No past surgical history on file.  No current outpatient medications on file.    No Known Allergies  Family History  No family history on file.  Social History       Past medical history, past surgical history, medications, allergies, family history, and social history were reviewed with the patient. No additional pertinent items.   A medically appropriate review of systems was performed with pertinent positives and negatives noted in the HPI, and all other systems negative.    Physical Exam   BP: (!) 145/100  Pulse: 106  Temp: 97.6  F (36.4  C)  Resp: 14  SpO2: 98 %  Physical Exam  Vitals and nursing note reviewed.   Constitutional:       Appearance: Normal appearance.   HENT:      Head: Normocephalic and atraumatic.      Nose: Nose normal.      Mouth/Throat:      Mouth: Mucous membranes are moist.      Pharynx: Oropharynx is clear.   Eyes:      Extraocular Movements: Extraocular movements intact.      Conjunctiva/sclera: Conjunctivae normal.      Pupils: Pupils are equal, round, and reactive to light.   Cardiovascular:      Rate and Rhythm: Normal rate and regular rhythm.   Pulmonary:      Effort: Pulmonary effort is normal.      Breath sounds: Normal breath sounds.   Abdominal:      General: Abdomen is flat. Bowel sounds are normal.      Palpations: Abdomen is soft.   Musculoskeletal:         General: Normal range of motion.      Cervical back: Normal range of motion and neck supple.   Skin:     General: Skin is warm and dry.      Comments: There is an area of dusky skin along the L great toenail and the adjacent tip of the L great toe, as well as non blanching  redness to several of the L lower toes. There is tenderness to palpation of the L great toe. No increased swelling, increased heat, or fluctuance or induration of any area of the L foot.   Neurological:      General: No focal deficit present.      Mental Status: He is alert and oriented to person, place, and time.      Comments: Sensation diminished distally in the bilateral lower extremities.   Psychiatric:         Mood and Affect: Mood normal.         Behavior: Behavior normal.         ED Course, Procedures, & Data      Procedures                 Results for orders placed or performed during the hospital encounter of 07/09/24   Foot XR, G/E 3 views, left     Status: None    Narrative    3 views left foot radiographs 7/9/2024 10:34 AM    History: left great toe wound, hx of severe PAD     Comparison: None    Findings:    Nonweightbearing AP, oblique, and lateral  views of the left foot were  obtained.     Mild irregularity of the medial aspect of the first phalangeal tuft on  the AP view.    Lisfranc articulation alignment is congruent on this non-weight  bearing study.    Mild degenerative changes of first metatarsophalangeal joint. Achilles  tendon insertional and plantar calcaneal enthesopathy.     Vascular calcifications.        Impression    Impression:  Irregularity, possible osteolysis of the medial first phalangeal tuft  on the AP view as can be seen with osteomyelitis. No prior comparison  available at time of dictation.    RUBEN ARCHIBALD MD (Joe)         SYSTEM ID:  [id number]    Lower Extremity Arterial Duplex Left     Status: None (Preliminary result)    Impression    RESIDENT PRELIMINARY INTERPRETATION  IMPRESSION:  LEFT: Patent flow throughout the left lower extremity arteries.  Evidence of outflow disease at the level of the distal SFA/popliteal  artery.      Guidelines:  [facility] duplex criteria for lower limb arterial  occlusive disease  -Percent stenosis- Normal (1-19%):  Peak systolic velocity (cm/s):  <150, End-diastolic velocity (cm/s): <40, Velocity ratio (Vr): <1.5,  Distal arterial waveform: Triphasic  -Percent stenosis- 20-49%: Peak systolic velocity (cm/s): 150-200,  End-diastolic velocity (cm/s): <40, Velocity ratio (Vr): 1.5-2.0,  Distal arterial waveform: Triphasic  -Percent stenosis- 50-75%: Peak systolic velocity (cm/s): 200-300,  End-diastolic velocity (cm/s): <90, Velocity ratio (Vr): 2.0-3.9,  Distal arterial waveform: Poststenotic turbulence distal to stenosis,  monophasic distal waveform  -Percent stenosis- >75%: Peak systolic velocity (cm/s): >300,  End-diastolic velocity (cm/s): <90, Velocity ratio (Vr): >4.0, Distal  arterial waveform: Dampened distal waveform and low PSV/EDV* in the  stenosis  -Percent stenosis- Occlusion: Absent flow by color Doppler/pulsed  Doppler spectral analysis; length of occlusion estimated from distance  between exit and reentry collateral arteries  *PSV = peak systolic velocity, EDV = end-diastolic velocity  http://link.jackson.com/chapter/10.1007/[email or web address]_23/fulltext  html   [place] Draw     Status: None    Narrative    The following orders were created for panel order [place] Draw.  Procedure                               Abnormality         Status                     ---------                               -----------         ------                     Extra Blue Top Tube[[id number]]                              Final result               Extra Red Top Tube[[id number]]                               Final result               Extra Green Top (Lithium...[[id number]]                      Final result               Extra Purple Top Tube[[id number]]                            Final result                 Please view results for these tests on the individual orders.   Extra Blue Top Tube     Status: None   Result Value Ref Range    Hold Specimen JIC    Extra Red Top Tube     Status: None   Result Value Ref Range    Hold Specimen JIC     Extra Green Top (Lithium Heparin) Tube     Status: None   Result Value Ref Range    Hold Specimen JIC    Extra Purple Top Tube     Status: None   Result Value Ref Range    Hold Specimen JIC    Glucose by meter     Status: Abnormal   Result Value Ref Range    GLUCOSE BY METER POCT 56 (L) 70 - 99 mg/dL   Basic metabolic panel     Status: Abnormal   Result Value Ref Range    Sodium 136 135 - 145 mmol/L    Potassium 4.5 3.4 - 5.3 mmol/L    Chloride 97 (L) 98 - 107 mmol/L    Carbon Dioxide (CO2) 26 22 - 29 mmol/L    Anion Gap 13 7 - 15 mmol/L    Urea Nitrogen 47.1 (H) 8.0 - 23.0 mg/dL    Creatinine 7.62 (H) 0.67 - 1.17 mg/dL    GFR Estimate 7 (L) >60 mL/min/1.73m2    Calcium 8.0 (L) 8.8 - 10.2 mg/dL    Glucose 55 (L) 70 - 99 mg/dL   CBC with platelets and differential     Status: Abnormal   Result Value Ref Range    WBC Count 7.0 4.0 - 11.0 10e3/uL    RBC Count 3.66 (L) 4.40 - 5.90 10e6/uL    Hemoglobin 12.3 (L) 13.3 - 17.7 g/dL    Hematocrit 36.2 (L) 40.0 - 53.0 %    MCV 99 78 - 100 fL    MCH 33.6 (H) 26.5 - 33.0 pg    MCHC 34.0 31.5 - 36.5 g/dL    RDW 15.1 (H) 10.0 - 15.0 %    Platelet Count 256 150 - 450 10e3/uL    % Neutrophils 69 %    % Lymphocytes 13 %    % Monocytes 9 %    % Eosinophils 9 %    % Basophils 0 %    % Immature Granulocytes 0 %    NRBCs per 100 WBC 0 <1 /100    Absolute Neutrophils 4.8 1.6 - 8.3 10e3/uL    Absolute Lymphocytes 0.9 0.8 - 5.3 10e3/uL    Absolute Monocytes 0.6 0.0 - 1.3 10e3/uL    Absolute Eosinophils 0.7 0.0 - 0.7 10e3/uL    Absolute Basophils 0.0 0.0 - 0.2 10e3/uL    Absolute Immature Granulocytes 0.0 <=0.4 10e3/uL    Absolute NRBCs 0.0 10e3/uL   Glucose by meter     Status: Abnormal   Result Value Ref Range    GLUCOSE BY METER POCT 64 (L) 70 - 99 mg/dL   Erythrocyte sedimentation rate auto     Status: Abnormal   Result Value Ref Range    Erythrocyte Sedimentation Rate 58 (H) 0 - 20 mm/hr   CRP inflammation     Status: Abnormal   Result Value Ref Range    CRP Inflammation 18.80 (H)  <5.00 mg/L   Glucose by meter     Status: Abnormal   Result Value Ref Range    GLUCOSE BY METER POCT 100 (H) 70 - 99 mg/dL   Glucose by meter     Status: Abnormal   Result Value Ref Range    GLUCOSE BY METER POCT 134 (H) 70 - 99 mg/dL   CBC with platelets differential     Status: Abnormal    Narrative    The following orders were created for panel order CBC with platelets differential.  Procedure                               Abnormality         Status                     ---------                               -----------         ------                     CBC with platelets and d...[[id number]]  Abnormal            Final result                 Please view results for these tests on the individual orders.     Medications   vancomycin (VANCOCIN) 1,500 mg in 0.9% NaCl 250 mL intermittent infusion (1,500 mg Intravenous $New Bag 7/9/24 1333)   vancomycin place jin - receiving intermittent dosing (has no administration in time range)   lidocaine 1 % 0.1-1 mL (has no administration in time range)   lidocaine (LMX4) cream (has no administration in time range)   sodium chloride (PF) 0.9% PF flush 3 mL (has no administration in time range)   sodium chloride (PF) 0.9% PF flush 3 mL (has no administration in time range)   senna-docusate (SENOKOT-S/PERICOLACE) 8.6-50 MG per tablet 1 tablet (has no administration in time range)     Or   senna-docusate (SENOKOT-S/PERICOLACE) 8.6-50 MG per tablet 2 tablet (has no administration in time range)   calcium carbonate (TUMS) chewable tablet 1,000 mg (has no administration in time range)   Patient is already receiving anticoagulation with heparin, enoxaparin (LOVENOX), warfarin (COUMADIN)  or other anticoagulant medication (has no administration in time range)   Warfarin Dose Required Daily - Pharmacist Managed (has no administration in time range)   allopurinol (ZYLOPRIM) tablet 200 mg (has no administration in time range)   carvedilol (COREG) tablet 12.5 mg (has no administration in  time range)   acetaminophen (TYLENOL) tablet 650 mg (has no administration in time range)   atorvastatin (LIPITOR) tablet 40 mg (has no administration in time range)   calcitRIOL (ROCALTROL) capsule 0.25 mcg (has no administration in time range)   sacubitril-valsartan (ENTRESTO) 49-51 MG per tablet 1 tablet (has no administration in time range)   melatonin tablet 5 mg (has no administration in time range)   cinacalcet (SENSIPAR) tablet 30 mg (has no administration in time range)   sevelamer carbonate (RENVELA) tablet 800 mg (has no administration in time range)   pantoprazole (PROTONIX) EC tablet 40 mg (has no administration in time range)   gentamicin (GARAMYCIN) 0.1 % cream (has no administration in time range)   torsemide (DEMADEX) tablet 100 mg (has no administration in time range)   glucose gel 15-30 g (has no administration in time range)     Or   dextrose 50 % injection 25-50 mL (has no administration in time range)     Or   glucagon injection 1 mg (has no administration in time range)   insulin aspart (NovoLOG) injection (RAPID ACTING) (has no administration in time range)   insulin aspart (NovoLOG) injection (RAPID ACTING) (has no administration in time range)   dextrose 50 % injection 25 mL ( Intravenous Canceled Entry 7/9/24 0977)   sodium chloride 0.9% BOLUS 500 mL (0 mLs Intravenous Stopped 7/9/24 1102)     Labs Ordered and Resulted from Time of ED Arrival to Time of ED Departure   GLUCOSE BY METER - Abnormal       Result Value    GLUCOSE BY METER POCT 56 (*)    BASIC METABOLIC PANEL - Abnormal    Sodium 136      Potassium 4.5      Chloride 97 (*)     Carbon Dioxide (CO2) 26      Anion Gap 13      Urea Nitrogen 47.1 (*)     Creatinine 7.62 (*)     GFR Estimate 7 (*)     Calcium 8.0 (*)     Glucose 55 (*)    CBC WITH PLATELETS AND DIFFERENTIAL - Abnormal    WBC Count 7.0      RBC Count 3.66 (*)     Hemoglobin 12.3 (*)     Hematocrit 36.2 (*)     MCV 99      MCH 33.6 (*)     MCHC 34.0      RDW 15.1 (*)      Platelet Count 256      % Neutrophils 69      % Lymphocytes 13      % Monocytes 9      % Eosinophils 9      % Basophils 0      % Immature Granulocytes 0      NRBCs per 100 WBC 0      Absolute Neutrophils 4.8      Absolute Lymphocytes 0.9      Absolute Monocytes 0.6      Absolute Eosinophils 0.7      Absolute Basophils 0.0      Absolute Immature Granulocytes 0.0      Absolute NRBCs 0.0     GLUCOSE BY METER - Abnormal    GLUCOSE BY METER POCT 64 (*)    ERYTHROCYTE SEDIMENTATION RATE AUTO - Abnormal    Erythrocyte Sedimentation Rate 58 (*)    CRP INFLAMMATION - Abnormal    CRP Inflammation 18.80 (*)    GLUCOSE BY METER - Abnormal    GLUCOSE BY METER POCT 100 (*)    GLUCOSE BY METER - Abnormal    GLUCOSE BY METER POCT 134 (*)    INR   GLUCOSE MONITOR NURSING POCT   HEMOGLOBIN A1C   GLUCOSE MONITOR NURSING POCT   GLUCOSE MONITOR NURSING POCT   BLOOD CULTURE   BLOOD CULTURE     US Lower Extremity Arterial Duplex Left   Preliminary Result   RESIDENT PRELIMINARY INTERPRETATION   IMPRESSION:   LEFT: Patent flow throughout the left lower extremity arteries.   Evidence of outflow disease at the level of the distal SFA/popliteal   artery.         Guidelines:   [facility] duplex criteria for lower limb arterial   occlusive disease   -Percent stenosis- Normal (1-19%): Peak systolic velocity (cm/s):   <150, End-diastolic velocity (cm/s): <40, Velocity ratio (Vr): <1.5,   Distal arterial waveform: Triphasic   -Percent stenosis- 20-49%: Peak systolic velocity (cm/s): 150-200,   End-diastolic velocity (cm/s): <40, Velocity ratio (Vr): 1.5-2.0,   Distal arterial waveform: Triphasic   -Percent stenosis- 50-75%: Peak systolic velocity (cm/s): 200-300,   End-diastolic velocity (cm/s): <90, Velocity ratio (Vr): 2.0-3.9,   Distal arterial waveform: Poststenotic turbulence distal to stenosis,   monophasic distal waveform   -Percent stenosis- >75%: Peak systolic velocity (cm/s): >300,   End-diastolic velocity (cm/s): <90,  Velocity ratio (Vr): >4.0, Distal   arterial waveform: Dampened distal waveform and low PSV/EDV* in the   stenosis   -Percent stenosis- Occlusion: Absent flow by color Doppler/pulsed   Doppler spectral analysis; length of occlusion estimated from distance   between exit and reentry collateral arteries   *PSV = peak systolic velocity, EDV = end-diastolic velocity   http://link.jackson.com/chapter/10.1007/[email or web address]_23/fulltext   html      Foot XR, G/E 3 views, left   Final Result   Impression:   Irregularity, possible osteolysis of the medial first phalangeal tuft   on the AP view as can be seen with osteomyelitis. No prior comparison   available at time of dictation.      RUBEN ARCHIBALD MD (Joe)            SYSTEM ID:  [id number]                 Assessment & Plan    This is a 63-year-old male with history of PAD, ESRD on PD, diabetes here with toe pain and discoloration.  Reports worsening discoloration to the left first toe have been evaluated at orthopedics and prescribed antibiotics.  Here there was some dark discoloration to the toe concerning for possible necrosis or ischemic injury.  There was some nonblanching erythema redness to the other toes but did not seem as consistent with cellulitis.  Exam also shows some tenderness.  A x-ray was obtained which did show signs of osteomyelitis.  His duplex ultrasound did not show arterial occlusion.  CBC without leukocytosis.    I discussed the patient with orthopedics and ordered inflammatory markers and blood cultures.  Patient required admission further management I spoke with the hospitalist who did except patient to the medicine service.    I have reviewed the nursing notes. I have reviewed the findings, diagnosis, plan and need for follow up with the patient.    New Prescriptions    No medications on file       Final diagnoses:   Osteomyelitis of left foot, unspecified type (H)     Above assessment and plan reviewed with attending physician,   Jarvis Langford.    Jigar Barcenas, MS4  [facility]  9:37 AM 7/9/2024    Jarvis Langford  [facility] EMERGENCY DEPARTMENT  7/9/2024    --    ED Attending Physician Attestation    I Jarvis Langford MD, cared for this patient with the Medical Student. I performed, or re-performed, the physical exam and medical decision-making. I have verified the accuracy of all the medical student findings and documentation above, and have edited as necessary.    Critical care was not performed.     Medical Decision Making  The patient's presentation was of high complexity (an acute health issue posing potential threat to life or bodily function).    The patient's evaluation involved:  review of external note(s) from 3+ sources (outside hospital discharge summary, recnet lcinic notes, ED notes)  review of 3+ test result(s) ordered prior to this encounter (cbc, bmp, prior US)  ordering and/or review of 3+ test(s) in this encounter (see separate area of note for details)  discussion of management or test interpretation with another health professional (see separate area of note for details)    The patient's management necessitated high risk (a decision regarding hospitalization).      Jarvis Langford MD  Emergency Medicine        Jarvis Langford MD  07/09/24 5045

## 2024-07-09 NOTE — PROGRESS NOTES
Brief Orthopedic Surgery/Podiatry Note    Orthopedics was consulted for concerns of osteomyelitis of patient's great toe.  After reviewing the patient's chart and speaking with the patient's provider, there is no immediate concerns and patient is in stable condition. Spoke with podiatry, Dr. Justice, as this is typically podiatry consultation.  He is aware of the patient consult.  And will see the patient tomorrow when he is in house.  Medicine will remain primary.  Please reach out to the orthopedic on-call resident if the patient decompensates or is in need of immediate care.    Of note, this patient was not evaluated by this provider. Please page Orthopedic Surgery if further assistance is needed.     Eder Lara MD  Orthopedic Surgery Resident- PGY1

## 2024-07-09 NOTE — H&P
[facility]    History and Physical - Hospitalist Service, GOLD TEAM 7       Date of Admission:  7/9/2024    Assessment & Plan   Mr. Arnulfo Pritchett is a 62 yo male with GERD, gout, insomnia, ESRD on PD, HFrEF, HTN, IDDM, DM peripheral neuropathy, CAD s/p stent placement x 3 (last 2021), TALI, HLD, and RLE PAD s/p balloon angio and stenting of SFA/popliteal arteries, admitted to [facility] after presenting to ED with 1 week hx or worsening L great toe wound with imaging concerning for OM.     # Worsening L great toe wound with imaging findings c/f OM  # Hx of acute RLE arterial occlusion s/p SFA popliteal balloon angioplasty and stenting, 5/6/2024  Presented to OSH [facility] with acute RLE pain with imaging revealing arterial occlusion within context of being intermittently non-compliant with apixaban due to financial reasons. Went to OR and is now s/p above procedure and discharged on Plavix and warfarin. Most recent post-procedure OP follow up with vascular surgery on 7/3 revealing patent vessels. Presented to ED here with 1 wk hx of worsening L great toe wound, initially noting small black area that has since increased in size. Denies significant pain but admits to significant neuropathy in BLEs. Repeat art US with patent flow but xray revealed medial first phalangeal irregularity c/f OM. Pt afebrile with normal WBC. Initial CRP ~ 18. Started on broad spectrum abx's,   - Orthopedic surgery and ID consulted and appreciate recommendations.   - No immediate procedures to be done per Ortho surgery  - In interim, obtain MRI L foot  - Continue renally dosed vanc and Zosyn  - WOCN consulted  - Continue PTA warfarin and Plavix  - Repeat CRP tomorrow am    # Chronic paroxysmal afib s/p atrial ablation, 6/2023  # HFrEF 2/2 ICM  # HLD  # HTN  # Hx of CAD s/p ALESIA placement (last 2021)  PTA now warfarin and Plavix instead of apixaban as above, as well as Entresto, Coreg, torsemide and statin.  Coreg dose recently reduced by nephrology due to low BPs. Most recent EF last month on 6/4 with severely decrease LV function with est EF 20-25%. Stefanieenltbarbara appears to be well compensated at this time. BPs currently elevated, however, pt asymptomatic.  - Continue PTA above meds     # ESRD on PD 2/2 DM nephropathy  # Secondary hyperparathyroidism  # Hx of RCC s/p R nephrectomy, 5/2022  PTA on nightly PD of which he has been tolerating. Access RLQ double cuffed coiled PD catheter placed 4/16/2021.   - Nephrology consulted and appreciate recommendations.   - Continue PD per renal team  - Continue PTA calcitriol, cinacalcet and sevelamer carbonate  - Continue every other day gentamicin cream to PD cath side    # Other chronic, stable medical conditions:  # Hx of gout: Continue PTA allopurinol  # TALI: Intolerant to nasal CPAP   # GERD: Continue daily PPI  # IDDM: Most recent A1C this past Feb., 6.5%. Holding PTA glargine in lieu of Novolog sliding scale for now. Accuchecks TID AC, at bedtime and 0200  # Insomnia: Continue PTA melatonin.        Diet: NPO per Anesthesia Guidelines for Procedure/Surgery Except for: Meds    DVT Prophylaxis: Warfarin  Rosario Catheter: Not present  Lines: None     Cardiac Monitoring: None  Code Status: Full Code      Disposition Plan     Medically Ready for Discharge: Anticipated in 2-4 Days     The patient's care was discussed with the Attending Physician, Dr. Johnson, Patient, Patient's Family, and Orthopedic Surgery Consultant(s).    Jaylon Moyer PA-C  Hospitalist Service, [facility] TEAM [address] [place] [address]  Securely message with [employer] (more info)  Text page via [facility] Paging/Directory   See signed in provider for up to date coverage information  ______________________________________________________________________    Chief Complaint   Great toe wound    History is obtained from the patient and electronic health record    History of Present Illness    Please refer to ED note by Dr. Langford dated 7/9 for full details. In brief, Mr. Arnulfo Pritchett is a 62 yo male with GERD, gout, insomnia, ESRD on PD, HFrEF, IDDM, DM peripheral neuropathy, CAD s/p stent placement x 3 (last 2018), TALI, HLD, and RLE PAD s/p balloon angio and stenting of SFA/popliteal arteries, admitted to [facility] after presenting to ED with 1 week hx or worsening L great toe wound with imaging concerning for OM.     Currently pt admits to mild L great toe pain but readily admits he has a lot of numbness in both feet from neuropathy. Denies fevers, chills, chest pain, SOB, nausea, abd pain, bowel and bladder concerns.       Past Medical History    Past Medical History:   Diagnosis Date    CAD (coronary artery disease)     Chronic systolic heart failure (H)     ESRD (end stage renal disease) on dialysis (H)     Gastroesophageal reflux disease without esophagitis     Gout     HLD (hyperlipidemia)     TALI (obstructive sleep apnea)     PAD (peripheral artery disease) (H24)     S/p RLE stenting of SFA/popliteal arteries    Type 2 diabetes mellitus with diabetic neuropathy (H)        Past Surgical History   No past surgical history on file.    Prior to Admission Medications   None        Review of Systems    The 10 point Review of Systems is negative other than noted in the HPI or here.     Social History   I have reviewed this patient's social history and updated it with pertinent information if needed.         Family History           Allergies   No Known Allergies     Physical Exam   Vital Signs: Temp: 97.6  F (36.4  C) Temp src: Oral BP: (!) 150/111 Pulse: 106   Resp: 14 SpO2: 98 % O2 Device: None (Room air)    Weight: 0 lbs 0 oz  GEN: In NAD  HEENT: NCAT; PERRL; sclerae non-icteric  LUNGS: CTAB  CV: RRR  ABD: +BSs; SNTND; RLQ PD catheter appears without obvious abnormalities.   EXT: LLE great toe with medial erythema but no open areas. Periungual small black areas. No significant tenderness.   SKIN: No  acute rashes noted on exposed areas.  NEURO: AAOx3; CNs grossly intact; No acute focal deficits noted.        Medical Decision Making       75 MINUTES SPENT BY ME on the date of service doing chart review, history, exam, documentation & further activities per the note.      Data   CMP  Recent Labs   Lab 07/09/24  1216 07/09/24  1143 07/09/24  0947 07/09/24  0914   NA  --   --   --  136   POTASSIUM  --   --   --  4.5   CHLORIDE  --   --   --  97*   CO2  --   --   --  26   ANIONGAP  --   --   --  13   * 64* 100* 55*   BUN  --   --   --  47.1*   CR  --   --   --  7.62*   GFRESTIMATED  --   --   --  7*   TERENCE  --   --   --  8.0*     CBC  Recent Labs   Lab 07/09/24  0914   WBC 7.0   RBC 3.66*   HGB 12.3*   HCT 36.2*   MCV 99   MCH 33.6*   MCHC 34.0   RDW 15.1*        INR  Recent Labs   Lab 07/09/24 0914   INR 3.29*       Results for orders placed or performed during the hospital encounter of 07/09/24   Foot XR, G/E 3 views, left    Narrative    3 views left foot radiographs 7/9/2024 10:34 AM    History: left great toe wound, hx of severe PAD     Comparison: None    Findings:    Nonweightbearing AP, oblique, and lateral  views of the left foot were  obtained.     Mild irregularity of the medial aspect of the first phalangeal tuft on  the AP view.    Lisfranc articulation alignment is congruent on this non-weight  bearing study.    Mild degenerative changes of first metatarsophalangeal joint. Achilles  tendon insertional and plantar calcaneal enthesopathy.     Vascular calcifications.        Impression    Impression:  Irregularity, possible osteolysis of the medial first phalangeal tuft  on the AP view as can be seen with osteomyelitis. No prior comparison  available at time of dictation.    RUBEN ARCHIBALD MD (Joe)         SYSTEM ID:  [id number]   US Lower Extremity Arterial Duplex Left    Impression    RESIDENT PRELIMINARY INTERPRETATION  IMPRESSION:  LEFT: Patent flow throughout the left lower extremity  arteries.  Evidence of outflow disease at the level of the distal SFA/popliteal  artery.      Guidelines:  [facility] duplex criteria for lower limb arterial  occlusive disease  -Percent stenosis- Normal (1-19%): Peak systolic velocity (cm/s):  <150, End-diastolic velocity (cm/s): <40, Velocity ratio (Vr): <1.5,  Distal arterial waveform: Triphasic  -Percent stenosis- 20-49%: Peak systolic velocity (cm/s): 150-200,  End-diastolic velocity (cm/s): <40, Velocity ratio (Vr): 1.5-2.0,  Distal arterial waveform: Triphasic  -Percent stenosis- 50-75%: Peak systolic velocity (cm/s): 200-300,  End-diastolic velocity (cm/s): <90, Velocity ratio (Vr): 2.0-3.9,  Distal arterial waveform: Poststenotic turbulence distal to stenosis,  monophasic distal waveform  -Percent stenosis- >75%: Peak systolic velocity (cm/s): >300,  End-diastolic velocity (cm/s): <90, Velocity ratio (Vr): >4.0, Distal  arterial waveform: Dampened distal waveform and low PSV/EDV* in the  stenosis  -Percent stenosis- Occlusion: Absent flow by color Doppler/pulsed  Doppler spectral analysis; length of occlusion estimated from distance  between exit and reentry collateral arteries  *PSV = peak systolic velocity, EDV = end-diastolic velocity  http://link.jackson.com/[email or web address]/fulltext  html

## 2024-07-09 NOTE — PHARMACY-ANTICOAGULATION SERVICE
Clinical Pharmacy - Warfarin Dosing Consult     Pharmacy has been consulted to manage this patient s warfarin therapy.  Indication: Atrial Fibrillation  Therapy Goal: INR 2-3  Warfarin Prior to Admission: Yes  Warfarin PTA Regimen: 5 mg on Monday, Wednesday, and Friday. 7.5 mg on all other days of the week  Recent documented change in oral intake/nutrition: No  Dose Comments: INR 3.29. Patient took warfarin at home 7/8 PM. Will hold today's dose and evaluate INR tomorrow    INR   Date Value Ref Range Status   07/09/2024 3.29 (H) 0.85 - 1.15 Final       Recommend holding warfarin today.  Pharmacy will monitor Arnulfo D Stewart daily and order warfarin doses to achieve specified goal.      Please contact pharmacy as soon as possible if the warfarin needs to be held for a procedure or if the warfarin goals change.       Jesse Gerard, ReidD

## 2024-07-10 PROBLEM — M86.9: Status: ACTIVE | Noted: 2024-07-10

## 2024-07-10 PROBLEM — Z99.2 DEPENDENCE ON RENAL DIALYSIS (H): Status: ACTIVE | Noted: 2024-07-10

## 2024-07-10 PROBLEM — E11.22 TYPE 2 DIABETES MELLITUS WITH ESRD (END-STAGE RENAL DISEASE) (H): Status: ACTIVE | Noted: 2024-07-10

## 2024-07-10 PROBLEM — E11.69: Status: ACTIVE | Noted: 2024-07-10

## 2024-07-10 PROBLEM — N18.6 TYPE 2 DIABETES MELLITUS WITH ESRD (END-STAGE RENAL DISEASE) (H): Status: ACTIVE | Noted: 2024-07-10

## 2024-07-10 LAB
ANION GAP SERPL CALCULATED.3IONS-SCNC: 12 MMOL/L (ref 7–15)
BUN SERPL-MCNC: 43.5 MG/DL (ref 8–23)
CALCIUM SERPL-MCNC: 8 MG/DL (ref 8.8–10.2)
CHLORIDE SERPL-SCNC: 99 MMOL/L (ref 98–107)
CREAT SERPL-MCNC: 7.01 MG/DL (ref 0.67–1.17)
DEPRECATED HCO3 PLAS-SCNC: 24 MMOL/L (ref 22–29)
EGFRCR SERPLBLD CKD-EPI 2021: 8 ML/MIN/1.73M2
ERYTHROCYTE [DISTWIDTH] IN BLOOD BY AUTOMATED COUNT: 14.8 % (ref 10–15)
GLUCOSE BLDC GLUCOMTR-MCNC: 123 MG/DL (ref 70–99)
GLUCOSE BLDC GLUCOMTR-MCNC: 129 MG/DL (ref 70–99)
GLUCOSE BLDC GLUCOMTR-MCNC: 182 MG/DL (ref 70–99)
GLUCOSE BLDC GLUCOMTR-MCNC: 191 MG/DL (ref 70–99)
GLUCOSE BLDC GLUCOMTR-MCNC: 216 MG/DL (ref 70–99)
GLUCOSE SERPL-MCNC: 231 MG/DL (ref 70–99)
HCT VFR BLD AUTO: 34.2 % (ref 40–53)
HGB BLD-MCNC: 11.5 G/DL (ref 13.3–17.7)
INR PPP: 2.6 (ref 0.85–1.15)
MCH RBC QN AUTO: 33.2 PG (ref 26.5–33)
MCHC RBC AUTO-ENTMCNC: 33.6 G/DL (ref 31.5–36.5)
MCV RBC AUTO: 99 FL (ref 78–100)
PLATELET # BLD AUTO: 239 10E3/UL (ref 150–450)
POTASSIUM SERPL-SCNC: 4.2 MMOL/L (ref 3.4–5.3)
RBC # BLD AUTO: 3.46 10E6/UL (ref 4.4–5.9)
SODIUM SERPL-SCNC: 135 MMOL/L (ref 135–145)
WBC # BLD AUTO: 6.1 10E3/UL (ref 4–11)

## 2024-07-10 PROCEDURE — 272N000004 HC RX 272: Performed by: INTERNAL MEDICINE

## 2024-07-10 PROCEDURE — 99221 1ST HOSP IP/OBS SF/LOW 40: CPT | Performed by: ASSISTANT, PODIATRIC

## 2024-07-10 PROCEDURE — 85610 PROTHROMBIN TIME: CPT | Performed by: PHYSICIAN ASSISTANT

## 2024-07-10 PROCEDURE — 96376 TX/PRO/DX INJ SAME DRUG ADON: CPT | Mod: 59

## 2024-07-10 PROCEDURE — 96375 TX/PRO/DX INJ NEW DRUG ADDON: CPT

## 2024-07-10 PROCEDURE — 250N000011 HC RX IP 250 OP 636: Performed by: INTERNAL MEDICINE

## 2024-07-10 PROCEDURE — G0378 HOSPITAL OBSERVATION PER HR: HCPCS

## 2024-07-10 PROCEDURE — 85027 COMPLETE CBC AUTOMATED: CPT | Performed by: PHYSICIAN ASSISTANT

## 2024-07-10 PROCEDURE — 250N000013 HC RX MED GY IP 250 OP 250 PS 637: Performed by: INTERNAL MEDICINE

## 2024-07-10 PROCEDURE — 99223 1ST HOSP IP/OBS HIGH 75: CPT | Performed by: INTERNAL MEDICINE

## 2024-07-10 PROCEDURE — 36415 COLL VENOUS BLD VENIPUNCTURE: CPT | Performed by: PHYSICIAN ASSISTANT

## 2024-07-10 PROCEDURE — 250N000013 HC RX MED GY IP 250 OP 250 PS 637: Performed by: PHYSICIAN ASSISTANT

## 2024-07-10 PROCEDURE — 99232 SBSQ HOSP IP/OBS MODERATE 35: CPT | Performed by: INTERNAL MEDICINE

## 2024-07-10 PROCEDURE — 99233 SBSQ HOSP IP/OBS HIGH 50: CPT | Performed by: INTERNAL MEDICINE

## 2024-07-10 PROCEDURE — 80048 BASIC METABOLIC PNL TOTAL CA: CPT | Performed by: PHYSICIAN ASSISTANT

## 2024-07-10 PROCEDURE — G0257 UNSCHED DIALYSIS ESRD PT HOS: HCPCS

## 2024-07-10 PROCEDURE — 82962 GLUCOSE BLOOD TEST: CPT

## 2024-07-10 PROCEDURE — 999N000092 HC STATISTIC LEVEL 4 EST PATIENT

## 2024-07-10 RX ORDER — CINACALCET 60 MG/1
60 TABLET, FILM COATED ORAL DAILY
COMMUNITY

## 2024-07-10 RX ORDER — ICODEXTRIN, SODIUM CHLORIDE, SODIUM LACTATE, CALCIUM CHLORIDE, MAGNESIUM CHLORIDE 7.5; 535; 448; 25.7; 5.08 G/100ML; MG/100ML; MG/100ML; MG/100ML; MG/100ML
1200 INJECTION, SOLUTION INTRAPERITONEAL
Status: DISCONTINUED | OUTPATIENT
Start: 2024-07-10 | End: 2024-07-11

## 2024-07-10 RX ORDER — WARFARIN SODIUM 2.5 MG/1
7.5 TABLET ORAL
Status: COMPLETED | OUTPATIENT
Start: 2024-07-10 | End: 2024-07-10

## 2024-07-10 RX ORDER — AMPICILLIN AND SULBACTAM 2; 1 G/1; G/1
3 INJECTION, POWDER, FOR SOLUTION INTRAMUSCULAR; INTRAVENOUS DAILY
Status: DISCONTINUED | OUTPATIENT
Start: 2024-07-10 | End: 2024-07-11 | Stop reason: HOSPADM

## 2024-07-10 RX ORDER — NYSTATIN 100000/ML
500000 SUSPENSION, ORAL (FINAL DOSE FORM) ORAL 4 TIMES DAILY
Status: DISCONTINUED | OUTPATIENT
Start: 2024-07-10 | End: 2024-07-11 | Stop reason: HOSPADM

## 2024-07-10 RX ORDER — GENTAMICIN SULFATE 1 MG/G
CREAM TOPICAL DAILY PRN
Status: DISCONTINUED | OUTPATIENT
Start: 2024-07-10 | End: 2024-07-11 | Stop reason: HOSPADM

## 2024-07-10 RX ORDER — CINACALCET 60 MG/1
60 TABLET, FILM COATED ORAL DAILY
Status: DISCONTINUED | OUTPATIENT
Start: 2024-07-11 | End: 2024-07-11 | Stop reason: HOSPADM

## 2024-07-10 RX ADMIN — CLOPIDOGREL BISULFATE 75 MG: 75 TABLET ORAL at 20:11

## 2024-07-10 RX ADMIN — WARFARIN SODIUM 7.5 MG: 2.5 TABLET ORAL at 18:18

## 2024-07-10 RX ADMIN — AMPICILLIN SODIUM AND SULBACTAM SODIUM 3 G: 2; 1 INJECTION, POWDER, FOR SOLUTION INTRAMUSCULAR; INTRAVENOUS at 13:21

## 2024-07-10 RX ADMIN — Medication 5 MG: at 21:52

## 2024-07-10 RX ADMIN — CARVEDILOL 12.5 MG: 12.5 TABLET, FILM COATED ORAL at 01:15

## 2024-07-10 RX ADMIN — SODIUM CHLORIDE, SODIUM LACTATE, CALCIUM CHLORIDE, MAGNESIUM CHLORIDE AND DEXTROSE: 2.5; 538; 448; 18.3; 5.08 INJECTION, SOLUTION INTRAPERITONEAL at 17:16

## 2024-07-10 RX ADMIN — ICODEXTRIN, SODIUM CHLORIDE, SODIUM LACTATE, CALCIUM CHLORIDE, MAGNESIUM CHLORIDE 1200 ML: 7.5; 535; 448; 25.7; 5.08 INJECTION, SOLUTION INTRAPERITONEAL at 17:16

## 2024-07-10 RX ADMIN — CARVEDILOL 12.5 MG: 12.5 TABLET, FILM COATED ORAL at 18:18

## 2024-07-10 RX ADMIN — SEVELAMER CARBONATE 800 MG: 800 TABLET, FILM COATED ORAL at 18:18

## 2024-07-10 RX ADMIN — Medication 5 MG: at 01:16

## 2024-07-10 RX ADMIN — CINACALCET 30 MG: 30 TABLET, FILM COATED ORAL at 11:08

## 2024-07-10 RX ADMIN — ATORVASTATIN CALCIUM 40 MG: 40 TABLET, FILM COATED ORAL at 10:03

## 2024-07-10 RX ADMIN — SACUBITRIL AND VALSARTAN 1 TABLET: 49; 51 TABLET, FILM COATED ORAL at 10:03

## 2024-07-10 RX ADMIN — SEVELAMER CARBONATE 800 MG: 800 TABLET, FILM COATED ORAL at 11:08

## 2024-07-10 RX ADMIN — CARVEDILOL 12.5 MG: 12.5 TABLET, FILM COATED ORAL at 10:03

## 2024-07-10 RX ADMIN — NYSTATIN 500000 UNITS: 100000 SUSPENSION ORAL at 16:32

## 2024-07-10 RX ADMIN — NYSTATIN 500000 UNITS: 100000 SUSPENSION ORAL at 20:11

## 2024-07-10 RX ADMIN — PIPERACILLIN AND TAZOBACTAM 2.25 G: 2; .25 INJECTION, POWDER, FOR SOLUTION INTRAVENOUS at 04:37

## 2024-07-10 RX ADMIN — TORSEMIDE 100 MG: 100 TABLET ORAL at 11:08

## 2024-07-10 RX ADMIN — PANTOPRAZOLE SODIUM 40 MG: 40 TABLET, DELAYED RELEASE ORAL at 10:03

## 2024-07-10 RX ADMIN — ALLOPURINOL 200 MG: 100 TABLET ORAL at 20:11

## 2024-07-10 RX ADMIN — SACUBITRIL AND VALSARTAN 1 TABLET: 49; 51 TABLET, FILM COATED ORAL at 20:11

## 2024-07-10 RX ADMIN — CALCITRIOL CAPSULES 0.25 MCG 0.25 MCG: 0.25 CAPSULE ORAL at 10:03

## 2024-07-10 ASSESSMENT — ACTIVITIES OF DAILY LIVING (ADL)
ADLS_ACUITY_SCORE: 28
WALKING_OR_CLIMBING_STAIRS_DIFFICULTY: YES
WERE_AUXILIARY_AIDS_OFFERED?: NO
ADLS_ACUITY_SCORE: 28
ADLS_ACUITY_SCORE: 27
ADLS_ACUITY_SCORE: 28
TOILETING_ISSUES: NO
DIFFICULTY_COMMUNICATING: NO
ADLS_ACUITY_SCORE: 28
ADLS_ACUITY_SCORE: 28
EQUIPMENT_CURRENTLY_USED_AT_HOME: WALKER, STANDARD;CANE, STRAIGHT
ADLS_ACUITY_SCORE: 28
HEARING_DIFFICULTY_OR_DEAF: YES
ADLS_ACUITY_SCORE: 28
ADLS_ACUITY_SCORE: 28
CONCENTRATING,_REMEMBERING_OR_MAKING_DECISIONS_DIFFICULTY: NO
ADLS_ACUITY_SCORE: 28
ADLS_ACUITY_SCORE: 28
DESCRIBE_HEARING_LOSS: BILATERAL HEARING LOSS
ADLS_ACUITY_SCORE: 28
ADLS_ACUITY_SCORE: 27
ADLS_ACUITY_SCORE: 28
ADLS_ACUITY_SCORE: 36
WALKING_OR_CLIMBING_STAIRS: OTHER (SEE COMMENTS)
DOING_ERRANDS_INDEPENDENTLY_DIFFICULTY: YES
ADLS_ACUITY_SCORE: 28
DIFFICULTY_EATING/SWALLOWING: NO
CHANGE_IN_FUNCTIONAL_STATUS_SINCE_ONSET_OF_CURRENT_ILLNESS/INJURY: NO
FALL_HISTORY_WITHIN_LAST_SIX_MONTHS: NO
DRESSING/BATHING_DIFFICULTY: NO
PATIENT'S_PREFERRED_MEANS_OF_COMMUNICATION: VERBAL
ADLS_ACUITY_SCORE: 28
WEAR_GLASSES_OR_BLIND: YES

## 2024-07-10 NOTE — PROGRESS NOTES
Admitted/transferred from: ED [facility] at 2130 hrs   Reason for admission/transfer: Osteomyelitis right big toe  2 RN skin assessment: completed by : JUAN CARLOS  Result of skin assessment and interventions/actions: visibly check visible areas of skin , feet arms and back, noted reddened left big toe and left foot, otherwise patient does not want full skin check as of the moment  Height, weight, drug calc weight: Done  Patient belongings (see Flowsheet)  MDRO education added to care planYes  ?

## 2024-07-10 NOTE — PLAN OF CARE
"Goal Outcome Evaluation:       Pt A&O X4 . Denies pain or discomfort. Peritoneal dialysis in progress. Renal diet 2 gm NA. SBA, get belt, and a cane. Refused Skin assessment. States \" I will do it later\" Visible skin intact  Cont of bowel and bladder. R piv-  on IV ATB      Problem: Adult Inpatient Plan of Care  Goal: Plan of Care Review  Description: The Plan of Care Review/Shift note should be completed every shift.  The Outcome Evaluation is a brief statement about your assessment that the patient is improving, declining, or no change.  This information will be displayed automatically on your shift  note.  Outcome: Progressing  Flowsheets (Taken 7/10/2024 0615)  Plan of Care Reviewed With: patient  Overall Patient Progress: no change  Goal: Patient-Specific Goal (Individualized)  Description: You can add care plan individualizations to a care plan. Examples of Individualization might be:  \"Parent requests to be called daily at 9am for status\", \"I have a hard time hearing out of my right ear\", or \"Do not touch me to wake me up as it startles  me\".  Outcome: Progressing  Goal: Absence of Hospital-Acquired Illness or Injury  Outcome: Progressing  Goal: Optimal Comfort and Wellbeing  Outcome: Progressing  Goal: Readiness for Transition of Care  Outcome: Progressing  Intervention: Mutually Develop Transition Plan  Recent Flowsheet Documentation  Taken 7/10/2024 0100 by Clifton Torres RN  Equipment Currently Used at Home:   walker, standard   cane, straight     Problem: Pain Acute  Goal: Optimal Pain Control and Function  Outcome: Progressing     Problem: Comorbidity Management  Goal: Blood Glucose Levels Within Targeted Range  Outcome: Progressing     Problem: Infection  Goal: Absence of Infection Signs and Symptoms  Outcome: Progressing                    "

## 2024-07-10 NOTE — UTILIZATION REVIEW
"Admission Status; Secondary Review Determination    Under the authority of the Utilization Management Committee, the utilization review process indicated a secondary review on the above patient. The review outcome is based on review of the medical records, discussions with staff, and applying clinical experience noted on the date of the review.    (x) Observation Status Appropriate - This patient does not meet hospital inpatient criteria and is placed in observation status. If this patient's primary payer is Medicare and was admitted as an inpatient, Condition Code 44 should be used and patient status changed to \"observation\".    RATIONALE FOR DETERMINATION    A 63-year-old male with a history of ESRD on peritoneal dialysis, HFrEF, hypertension, IDDM, CAD post-stent placement, TALI, HLD, GERD, gout, chronic paroxysmal afib, secondary hyperparathyroidism, and RLE PAD post-balloon angioplasty and stenting, was admitted for a worsening left great toe wound with imaging suggestive of osteomyelitis. He has significant peripheral neuropathy and presented with a one-week history of an enlarging black area on his left great toe. Labs showed an elevated CRP (~18) but normal WBC, and X-ray indicated medial first phalangeal irregularity. He is afebrile and currently on antibiotics. Podiatric  surgery and ID consultations were obtained, and an MRI of the left foot is pending. His chronic conditions, including ESRD managed with PD, HFrEF with an EF of 20-25%, and other comorbidities, are stable on current medications.    No immediate surgical interventions are planned, and his other chronic conditions are stable. As per Podiatry \"Appears these are ingrowing toenails without acute infection. Black and discolored areas may be some early ischemic changes but were easily debrided and more likely dry heme from previous bleeding \" , he can likely transition to outpatient follow-up for continued care, minimizing the need for prolonged " inpatient stay. Patient is awaiting ID review, If ID feels concern for sepsis and continued need for IV Abx patient can be considered for continued inpatient stay.     The severity of illness, intensity of service provided, expected LOS and risk for adverse outcome make the care appropriate for further observation; however, doesn't meet criteria for hospital inpatient admission. Dr. Arnel Van notified of this determination.    This document was produced using voice recognition software.    The information on this document is developed by the utilization review team in order for the business office to ensure compliance. This only denotes the appropriateness of proper admission status and does not reflect the quality of care rendered.    The definitions of Inpatient Status and Observation Status used in making the determination above are those provided in the CMS Coverage Manual, Chapter 1 and Chapter 6, section 70.4.    Sincerely,      Maurizio Glover MD FACP  Utilization Management    [facility].

## 2024-07-10 NOTE — PROGRESS NOTES
PRE PERITONEAL DIALYSIS TREATMENT NOTE      Date:  7/9/2024  Time:  10:21 PM    Data:   Total Treatment Volume mL:  72566   Total Duration of Therapy hours: 9   Fill Volume mL: 2500 mL  Total Number of Cycles: 4  Heater Bag: Volume:6000 mL, Dextrose 2.5%, Ca 2.5mEq with no additives  Side Bag # 1: [id number] mL, Dextrose 2.5%, Ca 2.5mEq with no additives  Final Fill:Volume 1200mL, Extraneal 7.5% with no additives:     PD Dressing Change: No, Gentamicin 0.1% Cream: No  Weight:80.3 kg (177 lb)kg Bed Scale    Assessment/Interventions:   Patient stable, VSS, Patient assessed and patient knowledgeable and is able to safely connect and disconnect PD independently.  Will continue to assess and educate patient about PD cares and therapy as needed. Pt connected to the cycler, treatment initiated w/o issues.          Plan:      Will follow per renal team. Continue to assess how patient tolerates PD therapy. PD RN available for questions at pager: [phone].

## 2024-07-10 NOTE — CONSULTS
Podiatry Consult Note    Date: July 10, 2024      ASSESSMENT/PLAN:  Patient seen and evaluated bedside.     Appears these are ingrowing toenails without acute infection. Black and discolored areas may be some early ischemic changes but were easily debrided and more likely dry heme from previous bleeding, I was able to trim back some of the nail to relieve the pressure but did not complete a formal procedure as I was unsure of blood flow here. I cleansed area well and dressed with betadine today    Imaging concern for osteo but I did not appreciate any signs of infection or deep wound probing today.     Will place nursing order for bacitracin and band-aid to left hallux toenail corners to be changed daily, patient will continue to complete this on discharge    No other recs at this time    I would recommend he continue to follow up with Vascular outpatient given RLE history and now some skin changes to LLE, but nothing emergent needed.      I will see him in my clinic 2-3 weeks after discharge and follow, serial xrays if needed.       Surgery: None indicated  Activity: as tolerated  Antibiotics: per team/ID  Diet: per team  Dressing: Bacitracin to Left hallux nail b/l corners changed daily.   Dispo: home with local wound cares    OBJECTIVE:    IMAGING:  3 views left foot radiographs 7/9/2024 10:34 AM     History: left great toe wound, hx of severe PAD      Comparison: None     Findings:     Nonweightbearing AP, oblique, and lateral  views of the left foot were  obtained.      Mild irregularity of the medial aspect of the first phalangeal tuft on  the AP view.     Lisfranc articulation alignment is congruent on this non-weight  bearing study.     Mild degenerative changes of first metatarsophalangeal joint. Achilles  tendon insertional and plantar calcaneal enthesopathy.      Vascular calcifications.                                                                        Impression:  Irregularity, possible osteolysis  "of the medial first phalangeal tuft  on the AP view as can be seen with osteomyelitis. No prior comparison  available at time of dictation.     RUBEN (Philip ARCHIBALD MD        BP (!) 150/107 (BP Location: Left arm)   Pulse 89   Temp 97.8  F (36.6  C) (Oral)   Resp 18   Wt 80.3 kg (177 lb)   SpO2 96%     Lab Results   Component Value Date    WBC 6.1 07/10/2024     Lab Results   Component Value Date    RBC 3.46 07/10/2024     Lab Results   Component Value Date    HGB 11.5 07/10/2024     Lab Results   Component Value Date    HCT 34.2 07/10/2024     No components found for: \"MCT\"  Lab Results   Component Value Date    MCV 99 07/10/2024     Lab Results   Component Value Date    MCH 33.2 07/10/2024     Lab Results   Component Value Date    MCHC 33.6 07/10/2024     Lab Results   Component Value Date    RDW 14.8 07/10/2024     Lab Results   Component Value Date     07/10/2024       Erythrocyte Sedimentation Rate   Date Value Ref Range Status   07/09/2024 58 (H) 0 - 20 mm/hr Final     CRP Inflammation   Date Value Ref Range Status   07/09/2024 18.80 (H) <5.00 mg/L Final         PHYSICAL EXAM:    General:    Derm: skin is shiny, dry atrophic, cool with spots of hyperpigmentation. The left hallux (as imaged below) with callus and dry crusting noted to medial and lateral borders of the nail. No purulence, no streaking erythema but there is some purple discoloration to tops of toes concerning for early ischemic changes.      Media Information             Media Information          Vascular:  DP pulse is very faint b/l  PT pulse is Not aplapble by me today   Cap refill is sluggish to toes b/l  Pedal hair is absent b/l      MSK:  MMT is 5/5 b/l foot and ankle.     Neuro: Gross sensation is diminished via light touch to pedal nerve branches b/l        HPI:  Patient is a 63 year old IDDM with neuropathy, PAD, Cad, ESRD on PD, Hf seen by myself for consult regarding Left hallux wound area. Patient notes his " daughter(?) was concerns that the areas were turning black and it was gangrene, wanted him to get looked at so he came in. He does not some pain to the Left big toe, especially in the nail corners, he was cornered it may be ingrowing. Denies any yellow, white or green drainage he just notes it started to bleed the other day and become painful. Denies f/c/n/v/d/sob recent associated with Left foot issue.     He has history of severe LE ischemia to right side, is followed up outpatient with vascular but states he needs to find something closer (from [place] but trying to move down here to be closer to grandkids and healthcare.)        Past Medical History:   Diagnosis Date    CAD (coronary artery disease)     Chronic systolic heart failure (H)     ESRD (end stage renal disease) on dialysis (H)     Gastroesophageal reflux disease without esophagitis     Gout     HLD (hyperlipidemia)     TALI (obstructive sleep apnea)     PAD (peripheral artery disease) (H24)     S/p RLE stenting of SFA/popliteal arteries    Type 2 diabetes mellitus with diabetic neuropathy (H)      Social Connections: Not on file      No Known Allergies  No past surgical history on file.  No family history on file.

## 2024-07-10 NOTE — PLAN OF CARE
Major Shift Events:    - admitted at 2130 hrs from [place] ED, MD Kristian Zacarias of patient admission  - AOx4, RA, SBA in transferring  from bed to bed, patient utilized a cane, afebrile, stable vitals, left big toe reddened , peritoneal dialysis site intact as per patient he does peritoneal dialysis himself.  - hypoglycemic at 61 with no s/s patient denies  headache , dizziness nor weakness and clammy skin, PRN   25 ml administered and orange juice given to patient., rechecked 171 mmol/dl  Plan: peritoneal dialysis to be done by HD nurse          : monitoring for blood sugar and ongoing Peritoneal dialysis   For vital signs and complete assessments, please see documentation flowsheets.   Problem: Adult Inpatient Plan of Care  Goal: Absence of Hospital-Acquired Illness or Injury  Intervention: Prevent Skin Injury  Recent Flowsheet Documentation  Taken 7/9/2024 2200 by Andrey Luis, RN  Body Position: position changed independently   Goal Outcome Evaluation:

## 2024-07-10 NOTE — CONSULTS
[facility] (formerly [facility]) ID SERVICE: NEW CONSULTATION  Patient:  Arnulfo Pritchett, Date of birth 1960, Medical record number [id number]  Date of Admission: 7/9/2024  Date of Visit:  7/10/2024  Requesting Provider: Pj Johnson    ASSESSMENT AND PLAN     Arnulfo Pritchett is a 63 year old male with ESKD and diabetes. His left great big toe has superficial alexsandra toe nail infection. There is no gross purulence or infected ulcer. We discussed that since it is superficial infection, we will treat it conservatively. He will need close follow-up. If it recurs or worsens, then we will request for MRI with IV contrast and he would most likely need source control procedure.     He satisfies At least two of these items are present:  Local swelling or induration  Erythema >0.5 but <2 cm  Local tenderness or pain  Local increased warmth  Purulent discharge    IMPRESSION:  Infected left ingrown toe nail of great toe with severe cellulitis, diabetic foot infection IWGDF/IDSA classification 2/mild  Hx of acute RLE arterial occlusion s/p SFA popliteal balloon angioplasty and stenting, 5/6/2024  Type 2 diabetes mellitus   ESKD on PD  CAD s/p stents 2018  HFrEF    RECOMMENDATIONS:  Continue IV amp-sulbactam 3g daily. Discharge on amox-clav.   Vascular surgery referral.     REFERENCE:  Felix ESPINOSA, Meliza PEÑA, van Barb SA, et al. IWGDF/IDSA guidelines on the diagnosis and treatment of diabetes-related foot infections (IWGDF/IDSA 2023). Diabetes Metab Res Rev. 2024;40(3):o4529.    Thank you for this consult. ID will continue to follow this patient. Please feel free to call with any questions.     Ashanti Beth MD  Infectious Diseases   Contact me via [employer]        SUBJECTIVE       History of Present Illness:    Arnulfo Pritchett is a 63 year old male with diabetes presenting with one week history of worsening L great toe wound. Denies fevers, chills, chest pain, SOB, nausea, abd pain, bowel and bladder concerns.     XR left foot Irregularity,  possible osteolysis of the medial first phalangeal tuft  on the AP view as can be seen with osteomyelitis.     Ultrasound showed slower than expected velocities throughout, etiology not demonstrated. Slow Dopplerable flow in the left anterior and posterior tibial arteries at the ankle. Tardus parvus waveforms suggest below knee disease.    Antimicrobial therapy:  Vanco 7/9-  Piptazo 7/9-7/10    Past Medical History:  ESRD, gout, insomnia, ESRD on PD, HFrEF, IDDM, DM peripheral neuropathy, CAD s/p stent placement x 3 (last 2018), TALI, HLD, and RLE PAD s/p balloon angio and stenting of SFA/popliteal arteries    OBJECTIVE     Physical Exam:  BP (!) 136/98 (BP Location: Left arm)   Pulse 95   Temp 97.5  F (36.4  C) (Oral)   Resp 18   Wt 80.3 kg (177 lb)   SpO2 99%    Exam:  GENERAL:  Well-developed, well-nourished, sitting in bed in no acute distress.   EYES:  Eyes have anicteric sclerae.    LUNGS:  Clear to auscultation.  CARDIOVASCULAR:  Regular rate and rhythm with no murmurs.  ABDOMEN:  Normal bowel sounds, soft, nontender.  EXT: Left big toe swollen with some redness interweb of the toes.   SKIN:  No acute rashes.  Line is in place without any surrounding erythema.    I reviewed the results of the following diagnostics:    CRP 18    Microbiology:  7/9 Bcx -

## 2024-07-10 NOTE — PROGRESS NOTES
Nephrology Progress Note  07/10/2024         Assessment & Recommendations:   Arnulfo Pritchett is a 63 year old year old is a 64 yo with ESRD on PD, HFrEF, IDDM, DM peripheral neuropathy, CAD, PAD, admitted to [facility] with OM. Nephrology consulted for management of ESRD     # ESRD   On HD initially for  two and half year and transitioned to PD since 11/23. Followed at [facility].  Home prescription : Four cycle with FV 2500ml , 2.5% PD solution  over 9 hours. FF with icodextran 1200 ml. She does drain the FF at 1 pm manually. Reported KT/V of 2.7 recently.  - Continue PD tonight with home prescription  - [place] PD catheter at all time  - Apply gentamicin cream to exit site daily  - Nystatin 500,000 unit po four times a day for prevention of fungal peritonitis until seven days after completion of systemic antibiotic     # Anemia of renal diseases  HB 11.5, in target . Iron - replete as of 6/3/2024.  - above threshold for ANSHUL     # MBD  - suspect calcium is at goal but need albumin level for correction, please also order phosphorus level  - Continue PTA  Renvela 800mg po tid with meal   - Continue PTA  Sensipar 60 mg po daily  and Calcitriol 0.25 mcg po daily      # Concern of Osteomyelitis of the left great toe    - abx narrowed to ampicillin-sulbactam    - as discussed above, need fungal prophylaxis while on systemic antibiotic    # RLE PAD s/p balloon angio and stenting of SFA/popliteal arteries 5/2024  - arterial occlusion with ischemic right leg 5/2024  - plavix  - warfarin (not able to afford eliquis)  # a-fib  #HFrEF  # CAD s/p stent placement x 3 (last 2018)  - carvedilol, entresto, atorvastatin, torsemide, warfarin  Known issue that I take into account for other medical decisions, management per primary team    Enedina Riojas MD   Division of Renal Disease and Hypertension  [employer]  Vocera Web Console    Interval History :   Nursing and provider notes from last 24 hours reviewed.  In the  last 24 hours Arnulfo Pritchett had PD which went fine, had a little over 400 ml UF with 2.5% dextrose. Breathing comfortably, no dyspnea, no CP, no leg edema. No abdominal pain.    Review of Systems:   I reviewed the following systems:  GI: stable appetite. no nausea or vomiting or diarrhea.   Neuro:  no confusion  Constitutional:  no fever or chills  CV: no dyspnea or edema.  no chest pain.    Physical Exam:   I/O last 3 completed shifts:  In: 1190 [P.O.:340; IV Piggyback:850]  Out: -    BP (!) 136/98 (BP Location: Left arm)   Pulse 95   Temp 97.5  F (36.4  C) (Oral)   Resp 18   Wt 80.3 kg (177 lb)   SpO2 99%      GENERAL APPEARANCE: no distress  EYES:  no scleral icterus, pupils equal  PULM: normal work of breathing, no clubbing  CV: no edema  INTEGUMENT: no cyanosis, no rash  NEURO:  speech fluent face symmetric  Access PD catheter exit site not examined    Labs:   All labs reviewed by me  Electrolytes/Renal -   Recent Labs   Lab Test 07/10/24  0643 07/10/24  0414 07/09/24  2312 07/09/24  0947 07/09/24  0914     --   --   --  136   POTASSIUM 4.2  --   --   --  4.5   CHLORIDE 99  --   --   --  97*   CO2 24  --   --   --  26   BUN 43.5*  --   --   --  47.1*   CR 7.01*  --   --   --  7.62*   * 216* 171*   < > 55*   TERENCE 8.0*  --   --   --  8.0*    < > = values in this interval not displayed.       CBC -   Recent Labs   Lab Test 07/10/24  0643 07/09/24  0914   WBC 6.1 7.0   HGB 11.5* 12.3*    256       LFTs - No lab results found.    Iron Panel - No lab results found.      Imaging:  All imaging studies reviewed by me.     Current Medications:  Current Facility-Administered Medications   Medication Dose Route Frequency Provider Last Rate Last Admin    allopurinol (ZYLOPRIM) tablet 200 mg  200 mg Oral QPM Jaylon Moyer PA-C   200 mg at 07/09/24 2010    atorvastatin (LIPITOR) tablet 40 mg  40 mg Oral Daily Jaylon Moyer PA-C        calcitRIOL (ROCALTROL) capsule 0.25 mcg  0.25 mcg  Oral Q Mon Wed Fri AM Jaylon Moyer PA-C        carvedilol (COREG) tablet 12.5 mg  12.5 mg Oral BID w/meals Jaylon Moyer PA-C   12.5 mg at 07/10/24 0115    cinacalcet (SENSIPAR) tablet 30 mg  30 mg Oral Daily Jaylon Moyer PA-C   30 mg at 07/09/24 1530    clopidogrel (PLAVIX) tablet 75 mg  75 mg Oral QPM Jaylon Moyer PA-C   75 mg at 07/09/24 2010    gentamicin (GARAMYCIN) 0.1 % cream   Topical Q48H Jaylon Moyer PA-C        insulin aspart (NovoLOG) injection (RAPID ACTING)  1-7 Units Subcutaneous TID AC Jaylon Moyer PA-C        insulin aspart (NovoLOG) injection (RAPID ACTING)  1-5 Units Subcutaneous At Bedtime Jaylon Moyer PA-C        melatonin tablet 5 mg  5 mg Oral At Bedtime Jaylon Moyer PA-C   5 mg at 07/10/24 0116    pantoprazole (PROTONIX) EC tablet 40 mg  40 mg Oral QAM AC Jaylon Moyer PA-C        sacubitril-valsartan (ENTRESTO) 49-51 MG per tablet 1 tablet  1 tablet Oral BID Jaylon Moyer PA-C   1 tablet at 07/09/24 2022    sevelamer carbonate (RENVELA) tablet 800 mg  800 mg Oral TID w/meals Jaylon Moyer PA-C   800 mg at 07/09/24 1530    sodium chloride (PF) 0.9% PF flush 3 mL  3 mL Intracatheter Q8H Jaylon Moyer PA-C   3 mL at 07/10/24 0440    torsemide (DEMADEX) tablet 100 mg  100 mg Oral QAM Jaylon Moyer PA-C   100 mg at 07/09/24 1529    Warfarin Dose Required Daily - Pharmacist Managed  1 each Oral See Admin Instructions Jaylon Moyer PA-C        warfarin-No DOSE today  1 each Does not apply no dose today (warfarin) Pj Johnson MD         Current Facility-Administered Medications   Medication Dose Route Frequency Provider Last Rate Last Admin    dianeal PD LOW calcium-2.5% dex (calcium 2.5 mEq/L) 10,000 mL PERITONEAL DIALYSATE   Dialysis PERITONEAL DIALYSIS Cally Seals MD   New Bag at 07/09/24 2200    extraneal 7.5% IP solution Peritoneal Dialysate  1,200 mL  1,200 mL Dialysis PERITONEAL DIALYSIS Cally Seals MD   1,200 mL at 07/09/24 2200    Patient is already receiving anticoagulation with heparin, enoxaparin (LOVENOX), warfarin (COUMADIN)  or other anticoagulant medication   Does not apply Continuous PRN Jaylon Moyer PA-C Sarah Liv Elfering, MD

## 2024-07-10 NOTE — PROGRESS NOTES
POST PERITONEAL DIALYSIS TREATMENT NOTE      Date:  7/10/2024  Time:  10:40 AM    Data: Overnight PD Therapy:   Total Treatment Volume mL:1150   Total Duration of Therapy hours: 9   Fill Volume mL: 2500 mL  Total Number of Cycles: 4  Final Fill:Volume: 1200 mLmL, Extraneal 7.5% with no additives:  PD Dressing Change: No, Gentamicin 0.1% Cream: No  Patient tolerated PD therapy well. VSS  Weight:80.3 kg (177 lb)kg Standing     Initial Drain mL: 976 mL  UF Fluid Removed:  440 mlmL  UF Fluid Added:  0 mlmL  Average Dwell Time Minutes:    Added or lost dwell time:  Lost Dwell Time Minutes: 4  Effluent description:  Clear, Yellow  Other Comments::      Assessment/Interventions:   Patient stable, VSS, Patient assessed and patient knowledgeable and is able to safely connect and disconnect PD independently.  Will continue to assess and educate patient about PD cares and therapy as needed     Plan:      Will follow per renal team. Continue to assess how patient tolerates PD therapy. PD RN available for questions at pager: [phone].

## 2024-07-10 NOTE — PROGRESS NOTES
PRE PERITONEAL DIALYSIS TREATMENT NOTE      Date:  7/10/2024  Time:  5:21 PM    Data:   Total Treatment Volume mL:  55235   Total Duration of Therapy hours: 9   Fill Volume mL: 2500 mL  Total Number of Cycles: 4  Heater Bag: Volume:6000 mL, Dextrose 2.5%, Ca 2.5mEq with no additives  Side Bag # 1: [id number] mL, Dextrose 2.5%, Ca 2.5mEq with no additives  Final Fill:Volume 1200 mL, Extraneal 7.5% with no additives:     PD Dressing Change: No, Gentamicin 0.1% Cream: No  Weight:80.3 kg (177 lb)kg Standing    Assessment/Interventions:   Patient stable, VSS, Patient assessed and patient knowledgeable and is able to safely connect and disconnect PD independently.  Will continue to assess and educate patient about PD cares and therapy as needed. Patient manually drained his final fill at 1700. Effluent is clear and yellow. Patient states he does not want to have his PD dressing changed today.         Plan:      Will follow per renal team. Continue to assess how patient tolerates PD therapy. PD RN available for questions at pager: [phone].

## 2024-07-10 NOTE — PROGRESS NOTES
[facility]    Medicine Progress Note - Hospitalist Service, GOLD TEAM 19    Date of Admission:  7/9/2024    Assessment & Plan   Mr. Arnulfo Pritchett is a 62 yo male with GERD, gout, insomnia, ESRD on PD, HFrEF, HTN, IDDM, DM peripheral neuropathy, CAD s/p stent placement x 3 (last 2021), TALI, HLD, and RLE PAD s/p balloon angio and stenting of SFA/popliteal arteries, admitted to [facility] on 7/9 after presenting to ED with 1 week hx of worsening L great toe wound with imaging concerning for OM.      #  Worsening L great toe wound with imaging findings c/f OM  #  Hx of acute RLE arterial occlusion s/p SFA popliteal balloon angioplasty and stenting, 5/6/2024  ---   Presented to [facility] with acute RLE pain with imaging revealing arterial occlusion within context of being intermittently non-compliant with apixaban due to financial reasons.   ---   Went to OR and is now s/p above procedure on 5/6/24 and discharged on Plavix and warfarin.   ---   Most recent post-procedure OP follow up with vascular surgery on 7/3 revealing patent vessels.   ---   Presented to ED on 7/9 with 1 wk hx of worsening L great toe wound, initially noting small black area that has since increased in size.  Denied significant pain but admits to significant neuropathy in BLEs.    ---   Arterial US 7/9 with patent flow but xray 7/9 revealed medial first phalangeal irregularity c/f OM.   ---   Pt has been afebrile with normal WBC.   ---   Initial CRP ~ 18.   ---   Started on broad spectrum abx's,    ---   No immediate procedures to be done per Ortho surgery  ---   Left foot MRI pending  ---   Continue renally dosed vanc and Zosyn  ---   WOCN consulted  ---   Continue PTA warfarin and Plavix  ---   Ortho/Podiatry and ID consult service consulted     #  Chronic paroxysmal afib s/p atrial ablation, 6/2023  #  HFrEF 2/2 ICM  #  HLD  #  HTN  #  Hx of CAD s/p ALESIA placement (last 2021)  ---   PTA now warfarin and  Plavix instead of apixaban as above, as well as Entresto, Coreg, torsemide and statin.  Coreg dose recently reduced by nephrology due to low BPs.  TTE 6/4 with severely decrease LV function with est EF 20-25%.   ---   Currently appears compensated.   ---   BPs currently elevated, however, pt asymptomatic.  ---   Continue above meds     #  ESRD on PD 2/2 DM nephropathy  #  Secondary hyperparathyroidism  #  Hx of RCC s/p R nephrectomy, 5/2022  ---   PTA on nightly PD of which he has been tolerating.  Access RLQ double cuffed coiled PD catheter placed 4/16/2021.   ---   Nephrology consulted and appreciate recommendations.   ---   Continue PD per renal team  ---   Continue PTA calcitriol, cinacalcet (increased to 60 mg daily) and sevelamer carbonate  ---   Continue every other day gentamicin cream to PD cath side  ---   Start Nystatin 500,000 unit po qid for prevention of fungal peritonitis till seven days after completion of systemic antibiotic      # Other chronic, stable medical conditions:  # Hx of gout:   Continue PTA allopurinol  # TALI:   Intolerant to nasal CPAP   # GERD:   Continue daily PPI  # IDDM:   Most recent A1C this past Feb., 6.5%. Holding PTA glargine in lieu of Novolog sliding scale for now.   Accuchecks TID AC, at bedtime and 0200  # Insomnia:   Continue PTA melatonin.        Diet: NPO per Anesthesia Guidelines for Procedure/Surgery Except for: Meds    DVT Prophylaxis: Warfarin  Rosario Catheter: Not present  Lines: None     Cardiac Monitoring: None  Code Status: Full Code    Disposition Plan    TBD, awaiting for Nephrology, Ortho/Podiatry and ID consult input            Arnel Van MD  Hospitalist Service, GOLD TEAM 19  [facility]  Securely message with [employer] (more info)  Text page via [facility] Paging/Directory   See signed in provider for up to date coverage information  ______________________________________________________________________    Interval  History   Uneventful night  No complaints  Asleep but arousable to verbal stimuli      Physical Exam   Vital Signs: Temp: 97.8  F (36.6  C) Temp src: Oral BP: (!) 150/107 (pt ambulated to bathroom; back to bed;) Pulse: 89   Resp: 18 SpO2: 96 % O2 Device: None (Room air)    Weight: 177 lbs 0 oz  General: aao x 3, NAD.  HEENT:  NC/AT, PERRL, EOMI, neck supple, no thyromegaly, op clear, mmm.  CVS:  NL s 1 and s2, no m/r/g.  Lungs:  CTA B/L.   Abd:  Soft, + bs, NT, no rebound or gaurding, no fluid shift.  Ext:             Lymph:  No edema.  Neuro:  Nonfocal.  Musculoskeletal: No calf tenderness to palpation.    Skin:  No rash.  Psychiatry:  Mood and affect appropriate.        Data     I have personally reviewed the following data over the past 24 hrs:    6.1  \   11.5 (L)   / 239     135 99 43.5 (H) /  129 (H)   4.2 24 7.01 (H) \     INR:  2.60 (H) PTT:  N/A   D-dimer:  N/A Fibrinogen:  N/A       Imaging results reviewed over the past 24 hrs:   No results found for this or any previous visit (from the past 24 hour(s)).

## 2024-07-11 VITALS
OXYGEN SATURATION: 98 % | DIASTOLIC BLOOD PRESSURE: 99 MMHG | WEIGHT: 177 LBS | RESPIRATION RATE: 18 BRPM | TEMPERATURE: 98.7 F | HEART RATE: 95 BPM | SYSTOLIC BLOOD PRESSURE: 139 MMHG

## 2024-07-11 LAB
ALBUMIN SERPL BCG-MCNC: 2.6 G/DL (ref 3.5–5.2)
ANION GAP SERPL CALCULATED.3IONS-SCNC: 11 MMOL/L (ref 7–15)
BUN SERPL-MCNC: 44.8 MG/DL (ref 8–23)
CALCIUM SERPL-MCNC: 7.6 MG/DL (ref 8.8–10.2)
CHLORIDE SERPL-SCNC: 96 MMOL/L (ref 98–107)
CREAT SERPL-MCNC: 7.51 MG/DL (ref 0.67–1.17)
DEPRECATED HCO3 PLAS-SCNC: 26 MMOL/L (ref 22–29)
EGFRCR SERPLBLD CKD-EPI 2021: 7 ML/MIN/1.73M2
ERYTHROCYTE [DISTWIDTH] IN BLOOD BY AUTOMATED COUNT: 14.8 % (ref 10–15)
GLUCOSE BLDC GLUCOMTR-MCNC: 126 MG/DL (ref 70–99)
GLUCOSE BLDC GLUCOMTR-MCNC: 128 MG/DL (ref 70–99)
GLUCOSE BLDC GLUCOMTR-MCNC: 222 MG/DL (ref 70–99)
GLUCOSE BLDC GLUCOMTR-MCNC: 232 MG/DL (ref 70–99)
GLUCOSE SERPL-MCNC: 204 MG/DL (ref 70–99)
HCT VFR BLD AUTO: 33.2 % (ref 40–53)
HGB BLD-MCNC: 11.3 G/DL (ref 13.3–17.7)
INR PPP: 2.25 (ref 0.85–1.15)
MCH RBC QN AUTO: 33.5 PG (ref 26.5–33)
MCHC RBC AUTO-ENTMCNC: 34 G/DL (ref 31.5–36.5)
MCV RBC AUTO: 99 FL (ref 78–100)
PHOSPHATE SERPL-MCNC: 5.3 MG/DL (ref 2.5–4.5)
PLATELET # BLD AUTO: 224 10E3/UL (ref 150–450)
POTASSIUM SERPL-SCNC: 4.4 MMOL/L (ref 3.4–5.3)
RBC # BLD AUTO: 3.37 10E6/UL (ref 4.4–5.9)
SODIUM SERPL-SCNC: 133 MMOL/L (ref 135–145)
WBC # BLD AUTO: 5.7 10E3/UL (ref 4–11)

## 2024-07-11 PROCEDURE — 99239 HOSP IP/OBS DSCHRG MGMT >30: CPT | Performed by: INTERNAL MEDICINE

## 2024-07-11 PROCEDURE — 99233 SBSQ HOSP IP/OBS HIGH 50: CPT | Performed by: INTERNAL MEDICINE

## 2024-07-11 PROCEDURE — G0257 UNSCHED DIALYSIS ESRD PT HOS: HCPCS

## 2024-07-11 PROCEDURE — 250N000013 HC RX MED GY IP 250 OP 250 PS 637: Performed by: INTERNAL MEDICINE

## 2024-07-11 PROCEDURE — 82962 GLUCOSE BLOOD TEST: CPT

## 2024-07-11 PROCEDURE — 36415 COLL VENOUS BLD VENIPUNCTURE: CPT | Performed by: PHYSICIAN ASSISTANT

## 2024-07-11 PROCEDURE — 250N000013 HC RX MED GY IP 250 OP 250 PS 637: Performed by: PHYSICIAN ASSISTANT

## 2024-07-11 PROCEDURE — 80069 RENAL FUNCTION PANEL: CPT | Performed by: PHYSICIAN ASSISTANT

## 2024-07-11 PROCEDURE — 85610 PROTHROMBIN TIME: CPT | Performed by: PHYSICIAN ASSISTANT

## 2024-07-11 PROCEDURE — 250N000011 HC RX IP 250 OP 636: Performed by: INTERNAL MEDICINE

## 2024-07-11 PROCEDURE — 85027 COMPLETE CBC AUTOMATED: CPT | Performed by: PHYSICIAN ASSISTANT

## 2024-07-11 PROCEDURE — G0378 HOSPITAL OBSERVATION PER HR: HCPCS

## 2024-07-11 PROCEDURE — 96376 TX/PRO/DX INJ SAME DRUG ADON: CPT | Mod: 59

## 2024-07-11 PROCEDURE — 999N000090 HC STATISTIC LEVEL 2 EST PATIENT

## 2024-07-11 RX ORDER — ICODEXTRIN, SODIUM CHLORIDE, SODIUM LACTATE, CALCIUM CHLORIDE, MAGNESIUM CHLORIDE 7.5; 535; 448; 25.7; 5.08 G/100ML; MG/100ML; MG/100ML; MG/100ML; MG/100ML
1200 INJECTION, SOLUTION INTRAPERITONEAL
Status: DISCONTINUED | OUTPATIENT
Start: 2024-07-11 | End: 2024-07-11 | Stop reason: HOSPADM

## 2024-07-11 RX ORDER — WARFARIN SODIUM 2.5 MG/1
7.5 TABLET ORAL
Status: DISCONTINUED | OUTPATIENT
Start: 2024-07-11 | End: 2024-07-11 | Stop reason: HOSPADM

## 2024-07-11 RX ORDER — GENTAMICIN SULFATE 1 MG/G
CREAM TOPICAL DAILY PRN
Status: DISCONTINUED | OUTPATIENT
Start: 2024-07-11 | End: 2024-07-11

## 2024-07-11 RX ORDER — NYSTATIN 100000/ML
500000 SUSPENSION, ORAL (FINAL DOSE FORM) ORAL 4 TIMES DAILY
Qty: 420 ML | Refills: 0 | Status: SHIPPED | OUTPATIENT
Start: 2024-07-11 | End: 2024-08-01

## 2024-07-11 RX ADMIN — CARVEDILOL 12.5 MG: 12.5 TABLET, FILM COATED ORAL at 10:32

## 2024-07-11 RX ADMIN — CINACALCET 60 MG: 60 TABLET ORAL at 10:32

## 2024-07-11 RX ADMIN — SEVELAMER CARBONATE 800 MG: 800 TABLET, FILM COATED ORAL at 16:49

## 2024-07-11 RX ADMIN — SEVELAMER CARBONATE 800 MG: 800 TABLET, FILM COATED ORAL at 10:32

## 2024-07-11 RX ADMIN — PANTOPRAZOLE SODIUM 40 MG: 40 TABLET, DELAYED RELEASE ORAL at 10:32

## 2024-07-11 RX ADMIN — SACUBITRIL AND VALSARTAN 1 TABLET: 49; 51 TABLET, FILM COATED ORAL at 10:32

## 2024-07-11 RX ADMIN — TORSEMIDE 100 MG: 100 TABLET ORAL at 10:32

## 2024-07-11 RX ADMIN — AMPICILLIN SODIUM AND SULBACTAM SODIUM 3 G: 2; 1 INJECTION, POWDER, FOR SOLUTION INTRAMUSCULAR; INTRAVENOUS at 13:58

## 2024-07-11 RX ADMIN — NYSTATIN 500000 UNITS: 100000 SUSPENSION ORAL at 10:32

## 2024-07-11 RX ADMIN — ATORVASTATIN CALCIUM 40 MG: 40 TABLET, FILM COATED ORAL at 10:32

## 2024-07-11 ASSESSMENT — ACTIVITIES OF DAILY LIVING (ADL)
ADLS_ACUITY_SCORE: 29
ADLS_ACUITY_SCORE: 28
ADLS_ACUITY_SCORE: 29
ADLS_ACUITY_SCORE: 29
ADLS_ACUITY_SCORE: 28
ADLS_ACUITY_SCORE: 28
ADLS_ACUITY_SCORE: 29
ADLS_ACUITY_SCORE: 28
ADLS_ACUITY_SCORE: 29
ADLS_ACUITY_SCORE: 28
ADLS_ACUITY_SCORE: 29
ADLS_ACUITY_SCORE: 28
ADLS_ACUITY_SCORE: 29
ADLS_ACUITY_SCORE: 29

## 2024-07-11 NOTE — PLAN OF CARE
Shift 0700 to 1807:    Goal Outcome Evaluation:      Plan of Care Reviewed With: patient    Overall Patient Progress: improving    Outcome Evaluation: Plan is to discharge later this shift    Pt A&Ox4, denies SOB, CP, nausea, vomiting, diarrhea, constipation, and pain. Pt endorses numbness and tingling in BLE feet. Pt continent of B&B, LBM 7/10 per pt. Renal 2g Na+ diet, takes medication whole with thin liquids. R PIV removed this shift. QID BG checks, sliding scale insulin, none needed this shift. L big toe wound assessed by podiatry this shift. Pt on peritoneal dialysis, manages on own with help of PD RN.     Pt discharged at 1807 to home, and left with personal belongings. Pt received complete discharge paperwork and medications as filled by discharge pharmacy. Pt was given times of last dose for all discharge medications on discharge medication sheets. Discharge teaching included medication, pain management, activity restrictions, and signs and symptoms of infection. Pt to follow up with Dr. Jarvis Mcelroy in 2-3 weeks and nephrology clinic for PD. Pt had no further questions at the time of discharge and no unmet needs were identified.

## 2024-07-11 NOTE — PROGRESS NOTES
Nephrology Progress Note  07/11/2024         Assessment & Recommendations:   Arnulfo Pritchett is a 63 year old year old is a 64 yo with ESRD on PD, HFrEF, IDDM, DM peripheral neuropathy, CAD, PAD, admitted to [facility] with OM. Nephrology consulted for management of ESRD     # ESRD   On HD initially for  two and half year and transitioned to PD since 11/23. Followed at [facility].  Home prescription : Four cycle with FV 2500ml , 2.5% PD solution  over 9 hours. FF with icodextran 1200 ml. He does drain the FF at 1 pm manually. Reported KT/V of 2.7 recently.  - Continue PD tonight with home prescription  - [place] PD catheter at all time  - Apply gentamicin cream to exit site daily  - Nystatin 500,000 unit po four times a day for prevention of fungal peritonitis until seven days after completion of systemic antibiotic     # Anemia of renal diseases  HB 11.3, in target . Iron - replete as of 6/3/2024.  - above threshold for ANSHUL     # MBD  - suspect calcium is at goal but need albumin level for correction, I have ordered albumin and phosphorus to be added to today's labs  - Continue PTA  Renvela 800mg po tid with meal   - Continue PTA  Sensipar 60 mg po daily  and Calcitriol 0.25 mcg po daily      # Concern of Osteomyelitis of the left great toe    - abx narrowed to ampicillin-sulbactam    - as discussed above, need fungal prophylaxis while on systemic antibiotic    # RLE PAD s/p balloon angio and stenting of SFA/popliteal arteries 5/2024  - arterial occlusion with ischemic right leg 5/2024  - plavix  - warfarin (not able to afford eliquis)  # a-fib  #HFrEF  # CAD s/p stent placement x 3 (last 2018)  - carvedilol, entresto, atorvastatin, torsemide, warfarin  Known issue that I take into account for other medical decisions, management per primary team    Enedina Riojas MD   Division of Renal Disease and Hypertension  Amcom  Vocera Web Console    Interval History :   Nursing and provider notes from last  24 hours reviewed.  Had some issues with getting PD cycler going last night, once going though did not have any problem. Doing fine today. Would like to have more options on the menu.    Review of Systems:   I reviewed the following systems:  GI: stable appetite. no nausea or vomiting or diarrhea.   Neuro:  no confusion  Constitutional:  no fever or chills  CV: no dyspnea or edema.  no chest pain.    Physical Exam:   I/O last 3 completed shifts:  In: 3 [I.V.:3]  Out: 440 [Other:440]   BP (!) 132/97 (BP Location: Left arm)   Pulse 90   Temp 97.6  F (36.4  C) (Oral)   Resp 18   Wt 80.3 kg (177 lb)   SpO2 97%      GENERAL APPEARANCE: no distress  EYES:  no scleral icterus, pupils equal  PULM: normal work of breathing, no clubbing  CV: no edema  INTEGUMENT: no cyanosis, no rash  NEURO:  speech fluent face symmetric  Access PD catheter exit site not examined    Labs:   All labs reviewed by me  Electrolytes/Renal -   Recent Labs   Lab Test 07/11/24 0943 07/11/24  0659 07/11/24  0550 07/10/24  0815 07/10/24  0643 07/09/24  0947 07/09/24  0914   NA  --  133*  --   --  135  --  136   POTASSIUM  --  4.4  --   --  4.2  --  4.5   CHLORIDE  --  96*  --   --  99  --  97*   CO2  --  26  --   --  24  --  26   BUN  --  44.8*  --   --  43.5*  --  47.1*   CR  --  7.51*  --   --  7.01*  --  7.62*   * 204* 232*   < > 231*   < > 55*   TERENCE  --  7.6*  --   --  8.0*  --  8.0*    < > = values in this interval not displayed.       CBC -   Recent Labs   Lab Test 07/11/24  0659 07/10/24  0643 07/09/24  0914   WBC 5.7 6.1 7.0   HGB 11.3* 11.5* 12.3*    239 256       LFTs - No lab results found.    Iron Panel - No lab results found.      Imaging:  All imaging studies reviewed by me.     Current Medications:  Current Facility-Administered Medications   Medication Dose Route Frequency Provider Last Rate Last Admin    allopurinol (ZYLOPRIM) tablet 200 mg  200 mg Oral QPM Jaylon Moyer PA-C   200 mg at 07/10/24 2011     ampicillin-sulbactam (UNASYN) 3 g vial to attach to  mL bag  3 g Intravenous Daily Veto Beth  mL/hr at 07/10/24 1321 3 g at 07/10/24 1321    atorvastatin (LIPITOR) tablet 40 mg  40 mg Oral Daily Jaylon Moyer PA-C   40 mg at 07/11/24 1032    calcitRIOL (ROCALTROL) capsule 0.25 mcg  0.25 mcg Oral Q Mon Wed Fri AM Jaylon Moyer PA-C   0.25 mcg at 07/10/24 1003    carvedilol (COREG) tablet 12.5 mg  12.5 mg Oral BID w/meals Jaylon Moyer PA-C   12.5 mg at 07/11/24 1032    cinacalcet (SENSIPAR) tablet 60 mg  60 mg Oral Daily Arnel Van MD   60 mg at 07/11/24 1032    clopidogrel (PLAVIX) tablet 75 mg  75 mg Oral QPM Jaylon Moyer PA-C   75 mg at 07/10/24 2011    gentamicin (GARAMYCIN) 0.1 % cream   Topical Q48H Jaylon Moyer PA-C        insulin aspart (NovoLOG) injection (RAPID ACTING)  1-7 Units Subcutaneous TID AC Jaylon Moyer PA-C        insulin aspart (NovoLOG) injection (RAPID ACTING)  1-5 Units Subcutaneous At Bedtime Jaylon Moyer PA-C        melatonin tablet 5 mg  5 mg Oral At Bedtime Jaylon Moyer PA-C   5 mg at 07/10/24 2152    nystatin (MYCOSTATIN) suspension 500,000 Units  500,000 Units Swish & Swallow 4x Daily Arnel Van MD   500,000 Units at 07/11/24 1032    pantoprazole (PROTONIX) EC tablet 40 mg  40 mg Oral QAM AC Jaylon Moyer PA-C   40 mg at 07/11/24 1032    sacubitril-valsartan (ENTRESTO) 49-51 MG per tablet 1 tablet  1 tablet Oral BID Jaylon Moyer PA-C   1 tablet at 07/11/24 1032    sevelamer carbonate (RENVELA) tablet 800 mg  800 mg Oral TID w/meals Jaylon Moyer PA-C   800 mg at 07/11/24 1032    sodium chloride (PF) 0.9% PF flush 3 mL  3 mL Intracatheter Q8H Jaylon Moyer PA-C   3 mL at 07/11/24 0601    torsemide (DEMADEX) tablet 100 mg  100 mg Oral QAM Jaylon Moyer PA-C   100 mg at 07/11/24 1032    Warfarin Dose Required Daily - Pharmacist Managed   1 each Oral See Admin Instructions Jaylon Moyer PA-C         Current Facility-Administered Medications   Medication Dose Route Frequency Provider Last Rate Last Admin    dianeal PD LOW calcium-2.5% dex (calcium 2.5 mEq/L) 10,000 mL PERITONEAL DIALYSATE   Dialysis PERITONEAL DIALYSIS Enedina Riojas MD   New Bag at 07/10/24 1716    extraneal 7.5% IP solution Peritoneal Dialysate 1,200 mL  1,200 mL Dialysis PERITONEAL DIALYSIS Enedina Riojas MD   1,200 mL at 07/10/24 1716    Patient is already receiving anticoagulation with heparin, enoxaparin (LOVENOX), warfarin (COUMADIN)  or other anticoagulant medication   Does not apply Continuous PRN Jaylon Moyer PA-C Sarah Liv Elfering, MD

## 2024-07-11 NOTE — PROGRESS NOTES
POST PERITONEAL DIALYSIS TREATMENT NOTE      Date:  7/11/2024  Time:  1:04 PM    Data: Overnight PD Therapy:   Total Treatment Volume mL:11,200   Total Duration of Therapy hours: 9   Fill Volume mL: 2500 mL  Total Number of Cycles: 4  Final Fill:Volume: 1200 mLmL, Extraneal 7.5% with no additives:  PD Dressing Change: No, Gentamicin 0.1% Cream: No  Patient tolerated PD therapy well. VSS  Weight:80.3 kg (177 lb)kg Standing     Initial Drain mL: 75 mL  UF Fluid Removed:  361 mlmL  UF Fluid Added:  0 mlmL  Average Dwell Time Minutes:    Added or lost dwell time:  Lost Dwell Time Minutes: 219  Effluent description:  Clear, Yellow  Other Comments: Patient manually drained FF at 5 pm on 7/10/24 so Initial drain volume does not include this amount.  Patient did not want to change his dressing this morning. He is independent in PD cares and will do this when he is home.      Assessment/Interventions:   Patient stable, VSS, Patient assessed and patient knowledgeable and is able to safely connect and disconnect PD independently.  Will continue to assess and educate patient about PD cares and therapy as needed     Plan:      Will follow per renal team. Continue to assess how patient tolerates PD therapy. PD RN available for questions at pager: [phone].

## 2024-07-11 NOTE — DISCHARGE SUMMARY
[facility]  Hospitalist Discharge Summary      Date of Admission:  7/9/2024  Date of Discharge:  7/11/2024  Discharging Provider: Arnel Van MD  Discharge Service: Hospitalist Service, GOLD TEAM 19    Discharge Diagnoses   Diabetic left great toe infection    Clinically Significant Risk Factors          Follow-ups Needed After Discharge   Follow-up Appointments    Follow Up and recommended labs and tests     1.  Follow-up with Dr. Jarvis Mcelroy of podiatry clinic in 2 to 3-week  2.  Follow up with nephrology clinic for PD      Discharge Disposition   Discharged to home  Condition at discharge: Stable    Hospital Course   Mr. Arnulfo Pritchett is a 64 yo male with GERD, gout, insomnia, ESRD on PD, HFrEF, HTN, IDDM, diabetic peripheral neuropathy, CAD s/p stent placement x 3 (last 2021), TALI, HLD, and RLE PAD s/p balloon angio and stenting of SFA/popliteal arteries, admitted to [facility] on 7/9 after presenting to ED with 1 week hx of worsening L great toe wound with imaging concerning for OM.      #  Diabetic left great toe infection  ---   Presented to [facility] in 5/2024 with acute RLE pain with imaging revealing arterial occlusion within context of being intermittently non-compliant with apixaban due to financial reasons.  W/u revealed acute RLE arterial occlusion.  He underwent SFA popliteal balloon angioplasty and stenting, 5/6/2024.  He was d/c'd on Plavix and warfarin.  Most recent postop follow up with vascular surgery on 7/3 revealing patent vessels.   ---   Presented to ED on 7/9 with 1 wk hx of worsening L great toe wound, initially noting small black area that has since increased in size.  Denied significant pain but admits to significant neuropathy in BLEs.    ---   Arterial US 7/9 with patent flow but xray 7/9 revealed medial first phalangeal irregularity c/f OM.   ---   Pt has been afebrile with normal WBC.   ---   Initial CRP ~ 18.   ---   Initiated on renally  dosed IV vancomycin and IV Zosyn and admitted to the hospitalist service on 7/9/24  ---   Seen by podiatry consult service who debrided pt's left great toe at bedside and did not that appreciate osteomyelitis.    ---   No evidence of left foot osteomyelitis per ID consult service as well  ---   ID and podiatry consult service feel that pt has diabetic left great infection.  S/p IV vancomycin and IV Zosyn x 3 days.  He will d/c to home today with Augmentin 875/125 mg po bid x 2 weeks  ---   He will follow-up with Podiatrist, Dr. Jarvis Justice in 2-3 weeks     #  Chronic paroxysmal afib s/p atrial ablation, 6/2023  #  HFrEF 2/2 ICM  #  HLD  #  HTN  #  Hx of CAD s/p ALESIA placement (last 2021)   ---   TTE 6/4/24 with severely decrease LV function with est EF 20-25%.   ---   Currently compensated and asymptomatic  ---   Continue PTA warfarin, Plavix, Entresto, torsemide, carvedilol and atorvastatin     #  ESRD on PD 2/2 DM nephropathy  #  Secondary hyperparathyroidism  #  Hx of RCC s/p R nephrectomy, 5/2022  ---   PTA on nightly PD of which he has been tolerating.  Access RLQ double cuffed coiled PD catheter placed 4/16/2021.   ---   Nephrology consulted and appreciate recommendations.   ---   Continue PD per renal team  ---   Continue PTA calcitriol, cinacalcet (increased to 60 mg daily) and sevelamer carbonate  ---   Continue every other day gentamicin cream to PD cath side  ---   He will discharge to home w/ Nystatin 500,000 unit po qid x 3 weeks for prevention of fungal peritonitis while on and 7 days after completion of Augmentin     # Other chronic, stable medical conditions:  # Hx of gout:   Continue PTA allopurinol  # TALI:   Intolerant to nasal CPAP   # GERD:   Continue daily PPI  # IDDM:   Most recent A1C this past Feb., 6.5%. Holding PTA glargine in lieu of Novolog sliding scale for now.   Accuchecks TID AC, at bedtime and 0200  # Insomnia:   Continue PTA melatonin.           Consultations This Hospital Stay    PHARMACY TO DOSE VANCO  ORTHOPAEDIC SURGERY ADULT/PEDS IP CONSULT  NEPHROLOGY ESRD ADULT IP CONSULT  PHARMACY TO DOSE WARFARIN  INFECTIOUS DISEASE GENERAL ADULT IP CONSULT  INFECTIOUS DISEASE [facility] ADULT IP CONSULT  PODIATRY IP CONSULT    Code Status   Full Code    Time Spent on this Encounter   I, Arnel Van MD, personally saw the patient today and spent greater than 30 minutes discharging this patient.       Arnel Van MD  [facility] MED SURG  [facility] [facility] [place] [address]  Phone: [phone]  Fax: [phone]  ______________________________________________________________________    Physical Exam   Vital Signs: Temp: 97.6  F (36.4  C) Temp src: Oral BP: (!) 132/97 Pulse: 90   Resp: 18 SpO2: 97 % O2 Device: None (Room air)    Weight: 177 lbs 0 oz  General: aao x 3, NAD.  HEENT:  NC/AT, PERRL, EOMI, neck supple, no thyromegaly, op clear, mmm.  CVS:  NL s 1 and s2, no m/r/g.  Lungs:  CTA B/L.   Abd:  Soft, + bs, NT, no rebound or gaurding, no fluid shift.  Ext: Left foot dressing clean/dry/intact  Lymph:  No edema.  Neuro:  Nonfocal.  Musculoskeletal: No calf tenderness to palpation.    Skin:  No rash.  Psychiatry:  Mood and affect appropriate.         Primary Care Physician   Tyler J Dunphy    Discharge Orders      Reason for your hospital stay    Diabetic foot ulcer     Activity    Your activity upon discharge: activity as tolerated     Follow Up and recommended labs and tests    1.  Follow-up with Dr. Jarvis Mcelroy of podiatry clinic in 2 to 3-week  2.  Follow up with nephrology clinic for PD     Diet    Follow this diet upon discharge:  Combination Diet Renal Diet (dialysis); 2 gm NA Diet       Discharge Medications   Current Discharge Medication List        START taking these medications    Details   amoxicillin-clavulanate (AUGMENTIN) 875-125 MG tablet Take 1 tablet by mouth 2 times daily  Qty: 28 tablet, Refills: 0    Associated Diagnoses: Diabetic ulcer of toe  of left foot associated with type 2 diabetes mellitus, unspecified ulcer stage (H)      nystatin (MYCOSTATIN) 190083 UNIT/ML suspension Swish and swallow 5 mLs (500,000 Units) in mouth 4 times daily for 21 days  Qty: 420 mL, Refills: 0    Associated Diagnoses: Diabetic ulcer of toe of left foot associated with type 2 diabetes mellitus, unspecified ulcer stage (H)           CONTINUE these medications which have NOT CHANGED    Details   acetaminophen (TYLENOL) 325 MG tablet Take 325-650 mg by mouth every 4 hours as needed      allopurinol (ZYLOPRIM) 100 MG tablet Take 200 mg by mouth every evening      atorvastatin (LIPITOR) 40 MG tablet Take 40 mg by mouth daily      calcitRIOL (ROCALTROL) 0.25 MCG capsule Take 0.25 mcg by mouth Every Mon, Wed, Fri Morning      calcium carbonate antacid 1000 MG CHEW Take 1,000 mg by mouth 4 times daily as needed      carvedilol (COREG) 25 MG tablet Take 12.5 mg by mouth 2 times daily (with meals)      cinacalcet (SENSIPAR) 60 MG tablet Take 60 mg by mouth daily      gentamicin (GARAMYCIN) 0.1 % external cream Apply topically daily      !! insulin aspart (NOVOLOG PEN) 100 UNIT/ML pen Inject 1-7 Units subcutaneously 3 times daily (before meals)      !! insulin aspart (NOVOLOG PEN) 100 UNIT/ML pen Inject 1-5 Units subcutaneously at bedtime      melatonin 5 MG tablet Take 5 mg by mouth at bedtime      pantoprazole (PROTONIX) 40 MG EC tablet Take 40 mg by mouth daily Every am before meals      PLAVIX 75 MG tablet Take 75 mg by mouth every evening      sacubitril-valsartan (ENTRESTO) 49-51 MG per tablet Take 1 tablet by mouth 2 times daily      Sennosides-Docusate Sodium (SENOKOT S PO) Take 2 tablets by mouth 2 times daily as needed      SEVELAMER CARBONATE PO Take 800 mg by mouth 3 times daily (with meals)      torsemide (DEMADEX) 100 MG tablet Take 100 mg by mouth every morning      warfarin ANTICOAGULANT (COUMADIN) 1 MG tablet Take 5 mg by mouth Every Mon, Wed, Fri Morning 7.5 mg all  other days       !! - Potential duplicate medications found. Please discuss with provider.        Allergies   No Known Allergies

## 2024-07-11 NOTE — PLAN OF CARE
Goal Outcome Evaluation:      Plan of Care Reviewed With: patient    Overall Patient Progress: no changeOverall Patient Progress: no change       Pt A/O x 4, standby assist with cane, on renal diet 2 grams, pills whole, continent of B/B, last BM on the 7/10/24, is on BG checks, pt on peritoneal dialysis, but manages on own with help of PD RN, called appropriately, was able to make needs known, denies, pain, SOB, numbness and tingling. Continue pt's plan of care.

## 2024-07-11 NOTE — PLAN OF CARE
"Shift 0700 to 1930:    Goal Outcome Evaluation:      Plan of Care Reviewed With: patient    Overall Patient Progress: improving    Outcome Evaluation: No acute changes this shift    Pt A&Ox4, denies SOB, CP, nausea, vomiting, and diarrhea. N/T to BLE. Pt reports \"slight constipation.\" R PIV SL. BG checks QID, sliding scale insulin. Renal diet 2g Na+, takes medications whole with thin liquids. SBA w/cane. Pt on peritoneal dialysis, pt manages on own with help of PD RN. Continent of B&B. Skin intact except for L big toe wound and redness to LLE toes. Wound care for L big toe nail added, starts tomorrow 7/11. Pt has call light in reach, calls appropriately, and has no unanswered questions or concerns at end of shift. Continue POC.    "

## 2024-07-11 NOTE — PROGRESS NOTES
[facility] INFECTIOUS DISEASES PROGRESS NOTE     Patient:  Arnulfo Pritchett   YOB: 1960, MRN: [id number]  Date of Visit: 07/11/2024  Date of Admission: 7/9/2024  Consult Requester: Pj Johnson MD          ASSESSMENT AND PLAN     Arnulfo Pritchett is a 63 year old male with ESKD and diabetes. His left great big toe has superficial alexsandra toe nail infection. There is no gross purulence or infected ulcer. We discussed that since it is superficial infection, we will treat it conservatively. He will need close follow-up. If it recurs or worsens, then we will request for MRI with IV contrast and he would most likely need source control procedure.     He told me that he has severe diarrhea on amox-clav so he prefers cephalexin. This would cover GP organisms well.     IMPRESSION  Diabetic foot infection (left great toe) IWGDF/IDSA classification 2/mild  Hx of acute RLE arterial occlusion s/p SFA popliteal balloon angioplasty and stenting, 5/6/2024  Type 2 diabetes mellitus   ESKD on PD  CAD s/p stents 2018  HFrEF    RECOMMENDATIONS:  Continue IV amp-sulbactam 3g daily while inpatient. Discharge on oral cephalexin 500mg daily. Total antibiotic duration until Jul 23, 2024.  ID follow up in August.    ID will sign off. Please check Amcom for staff covering the service for questions.     Ashanti Beth MD  Infectious Diseases  Vocera barrera    50 MINUTES SPENT BY ME on the date of service doing chart review, history, exam, documentation & further activities per the note.           SUBJECTIVE      Interval History and Events:  Has less toe pain.     Antimicrobial Treatment:  Amp-sul 7/10-  Vanco 7/9-7/10  Piptazo 7/9-7/10         OBJECTIVE       Physical Examination:    Temp:  [97.4  F (36.3  C)-97.8  F (36.6  C)] 97.6  F (36.4  C)  Pulse:  [] 90  Resp:  [18] 18  BP: (117-132)/(82-97) 132/97  SpO2:  [96 %-97 %] 97 %    I/O last 3 completed shifts:  In: 3 [I.V.:3]  Out: 440 [Other:440]    Vitals:     07/09/24 1828   Weight: 80.3 kg (177 lb)     Musculoskeletal: Left big toe swelling but minimal tenderness. No feet swelling.     Laboratory Data:    CRP 18    Microbiology:  7/9 Bcx -

## 2024-07-12 ENCOUNTER — TELEPHONE (OUTPATIENT)
Dept: ORTHOPEDICS | Facility: CLINIC | Age: 64
End: 2024-07-12
Payer: COMMERCIAL

## 2024-07-12 NOTE — TELEPHONE ENCOUNTER
----- Message from Chikis PITT sent at 7/12/2024  8:25 AM CDT -----  Regarding: FW: add pt  Hello-    Please call and schedule 3 week ED follow up-(Okay per Dr. Justice) for left great toe wound.       Thank you.     ROBER Diop  ----- Message -----  From: Jarvis Justice DPM  Sent: 7/12/2024   7:53 AM CDT  To: Chikis Zuluaga LPN  Subject: add pt                                           Chikis,    Let's add this fella on for clinic like 3 weeks.     Thank you, see you this afternoon    Rj

## 2024-07-12 NOTE — TELEPHONE ENCOUNTER
Not a good time to schedule, myc sent with contact info. Writer will call back in the afternoon on 7/12/24 to schedule:    Appointment type: New foot/ankle  Provider: Dr. Justice  Return date: 8/2/24

## 2024-07-12 NOTE — TELEPHONE ENCOUNTER
Left Voicemail (1st Attempt) and Sent Mychart (1st Attempt) for the patient to call back and schedule the following:    Appointment type: Return foot/ankle  Provider: Dr. Justice  Return date: 8/2/24

## 2024-07-13 ENCOUNTER — PATIENT OUTREACH (OUTPATIENT)
Dept: CARE COORDINATION | Facility: CLINIC | Age: 64
End: 2024-07-13
Payer: COMMERCIAL

## 2024-07-13 NOTE — PROGRESS NOTES
[facility] Contact  [facility]/Voicemail     Clinical Data: Post-Discharge Outreach     Outreach attempted x 2.  Left message on patient's voicemail, providing [facility]'s central phone number of [phone] ([phone]) for questions/concerns and/or to schedule an appt with an [facility] provider, if they do not have a PCP.      Plan:  [facility] will do no further outreaches at this time.           MAYA Mcadams  [phone]  [facility]

## 2024-07-14 LAB
BACTERIA BLD CULT: NO GROWTH
BACTERIA BLD CULT: NO GROWTH

## 2024-07-16 NOTE — TELEPHONE ENCOUNTER
Patient confirmed scheduled appointment:  Date: 8/2/24  Time: 2:00pm, ok per Dr. Justice  Visit type: New foot/ankle  Provider: Dr. Justice

## 2024-08-02 ENCOUNTER — OFFICE VISIT (OUTPATIENT)
Dept: ORTHOPEDICS | Facility: CLINIC | Age: 64
End: 2024-08-02
Payer: MEDICARE

## 2024-08-02 ENCOUNTER — PRE VISIT (OUTPATIENT)
Dept: ORTHOPEDICS | Facility: CLINIC | Age: 64
End: 2024-08-02

## 2024-08-02 DIAGNOSIS — L97.521 ULCER OF TOE OF LEFT FOOT, LIMITED TO BREAKDOWN OF SKIN (H): Primary | ICD-10-CM

## 2024-08-02 DIAGNOSIS — B35.1 TINEA UNGUIUM: ICD-10-CM

## 2024-08-02 DIAGNOSIS — E11.42 DIABETIC POLYNEUROPATHY ASSOCIATED WITH TYPE 2 DIABETES MELLITUS (H): ICD-10-CM

## 2024-08-02 PROCEDURE — 99202 OFFICE O/P NEW SF 15 MIN: CPT | Mod: 25 | Performed by: ASSISTANT, PODIATRIC

## 2024-08-02 PROCEDURE — 11721 DEBRIDE NAIL 6 OR MORE: CPT | Performed by: ASSISTANT, PODIATRIC

## 2024-08-02 NOTE — TELEPHONE ENCOUNTER
DIAGNOSIS: Great L toe wound, ok to schedule per Dr. Justice    APPOINTMENT DATE: 8/2/24   NOTES STATUS DETAILS   OFFICE NOTE from referring provider Internal Self Referral   MEDICATION LIST Internal

## 2024-08-02 NOTE — PROGRESS NOTES
"Podiatry Clinic Note    HPI:  Patient is a 63 year old IDDM with neuropathy, PAD, Cad, ESRD on PD, Hf seen by myself in clinic for follow-up of left hallux wounds patient was seen in the inpatient setting a few weeks ago.  Patient notes he has been leaving the area out to dry most of the time.  Denies any increased redness, swelling, drainage.  Believes that part of it is healed.  Notes that it is difficult to trim toenails, and his nails are getting too long and bothersome for him.  He was concerned and patient for osteolysis of the medial first phalangeal tuft, following up closely.  Patient seeing infectious disease as well        OBJECTIVE:      There were no vitals taken for this visit.    Lab Results   Component Value Date    WBC 6.1 07/10/2024     Lab Results   Component Value Date    RBC 3.46 07/10/2024     Lab Results   Component Value Date    HGB 11.5 07/10/2024     Lab Results   Component Value Date    HCT 34.2 07/10/2024     No components found for: \"MCT\"  Lab Results   Component Value Date    MCV 99 07/10/2024     Lab Results   Component Value Date    MCH 33.2 07/10/2024     Lab Results   Component Value Date    MCHC 33.6 07/10/2024     Lab Results   Component Value Date    RDW 14.8 07/10/2024     Lab Results   Component Value Date     07/10/2024       Erythrocyte Sedimentation Rate   Date Value Ref Range Status   07/09/2024 58 (H) 0 - 20 mm/hr Final     CRP Inflammation   Date Value Ref Range Status   07/09/2024 18.80 (H) <5.00 mg/L Final         PHYSICAL EXAM:    General:    Derm: skin is shiny, dry atrophic, cool with spots of hyperpigmentation.  The left hallux nail is elongated, there is a superficial wound with stable eschar to the distal medial aspect of the toenail.  I do not appreciate any deep probing or local infection at this time, the lateral aspect has healed, the toe is normal color.       Vascular:  DP pulse is very faint b/l  PT pulse is difficult to palpate bilateral  Cap refill " is sluggish to toes b/l  Pedal hair is absent b/l      MSK:  MMT is 5/5 b/l foot and ankle.     Neuro: Gross sensation is diminished via light touch to pedal nerve branches b/l        ASSESSMENT/PLAN:  Patient seen and evaluated today in clinic.  Left medial hallux distal nail border with a small area of superficial stable eschar and wound.  Likely from ingrowing as was seen during inpatient setting.  The lateral wound is now completely healed.  There was a concern for osteomyelitis due to some osteolysis on x-ray.  Today did not notice any signs of acute infection or worsening of the area, if anything the lateral wound has healed and now there is a medial wound remaining that appears healthy without probe to bone today.    -Debrided nails 1 through 10 using sterile nail clippers, focus on left medial and lateral hallux nail to remove distal offending nail border to help relieve where these wounds are likely starting.  -Dressed with bacitracin and Band-Aid, patient to continue this dressing for the next couple weeks.  Change every single day.    Follow-up in podiatry clinic 2 to 3 weeks for evaluation.  I had discussed in the inpatient setting to follow-up with vascular I do believe that they placed a consult will double check here.  He notes he is following up with infectious disease as well.          Past Medical History:   Diagnosis Date    CAD (coronary artery disease)     Chronic systolic heart failure (H)     ESRD (end stage renal disease) on dialysis (H)     Gastroesophageal reflux disease without esophagitis     Gout     HLD (hyperlipidemia)     TALI (obstructive sleep apnea)     PAD (peripheral artery disease) (H24)     S/p RLE stenting of SFA/popliteal arteries    Type 2 diabetes mellitus with diabetic neuropathy (H)      Social Connections: Not on file      No Known Allergies  No past surgical history on file.  No family history on file.

## 2024-08-02 NOTE — LETTER
"8/2/2024      Arnulfo Pritchett  [address] [place] [id number]      Dear Colleague,    Thank you for referring your patient, Arnulfo Pritchett, to the [facility] [place]. Please see a copy of my visit note below.    Podiatry Clinic Note    HPI:  Patient is a 63 year old IDDM with neuropathy, PAD, Cad, ESRD on PD, Hf seen by myself in clinic for follow-up of left hallux wounds patient was seen in the inpatient setting a few weeks ago.  Patient notes he has been leaving the area out to dry most of the time.  Denies any increased redness, swelling, drainage.  Believes that part of it is healed.  Notes that it is difficult to trim toenails, and his nails are getting too long and bothersome for him.  He was concerned and patient for osteolysis of the medial first phalangeal tuft, following up closely.  Patient seeing infectious disease as well        OBJECTIVE:      There were no vitals taken for this visit.    Lab Results   Component Value Date    WBC 6.1 07/10/2024     Lab Results   Component Value Date    RBC 3.46 07/10/2024     Lab Results   Component Value Date    HGB 11.5 07/10/2024     Lab Results   Component Value Date    HCT 34.2 07/10/2024     No components found for: \"MCT\"  Lab Results   Component Value Date    MCV 99 07/10/2024     Lab Results   Component Value Date    MCH 33.2 07/10/2024     Lab Results   Component Value Date    MCHC 33.6 07/10/2024     Lab Results   Component Value Date    RDW 14.8 07/10/2024     Lab Results   Component Value Date     07/10/2024       Erythrocyte Sedimentation Rate   Date Value Ref Range Status   07/09/2024 58 (H) 0 - 20 mm/hr Final     CRP Inflammation   Date Value Ref Range Status   07/09/2024 18.80 (H) <5.00 mg/L Final         PHYSICAL EXAM:    General:    Derm: skin is shiny, dry atrophic, cool with spots of hyperpigmentation.  The left hallux nail is elongated, there is a superficial wound with stable eschar to the distal medial aspect of " the toenail.  I do not appreciate any deep probing or local infection at this time, the lateral aspect has healed, the toe is normal color.       Vascular:  DP pulse is very faint b/l  PT pulse is difficult to palpate bilateral  Cap refill is sluggish to toes b/l  Pedal hair is absent b/l      MSK:  MMT is 5/5 b/l foot and ankle.     Neuro: Gross sensation is diminished via light touch to pedal nerve branches b/l        ASSESSMENT/PLAN:  Patient seen and evaluated today in clinic.  Left medial hallux distal nail border with a small area of superficial stable eschar and wound.  Likely from ingrowing as was seen during inpatient setting.  The lateral wound is now completely healed.  There was a concern for osteomyelitis due to some osteolysis on x-ray.  Today did not notice any signs of acute infection or worsening of the area, if anything the lateral wound has healed and now there is a medial wound remaining that appears healthy without probe to bone today.    -Debrided nails 1 through 10 using sterile nail clippers, focus on left medial and lateral hallux nail to remove distal offending nail border to help relieve where these wounds are likely starting.  -Dressed with bacitracin and Band-Aid, patient to continue this dressing for the next couple weeks.  Change every single day.    Follow-up in podiatry clinic 2 to 3 weeks for evaluation.  I had discussed in the inpatient setting to follow-up with vascular I do believe that they placed a consult will double check here.  He notes he is following up with infectious disease as well.          Past Medical History:   Diagnosis Date     CAD (coronary artery disease)      Chronic systolic heart failure (H)      ESRD (end stage renal disease) on dialysis (H)      Gastroesophageal reflux disease without esophagitis      Gout      HLD (hyperlipidemia)      TALI (obstructive sleep apnea)      PAD (peripheral artery disease) (H24)     S/p RLE stenting of SFA/popliteal arteries      Type 2 diabetes mellitus with diabetic neuropathy (H)      Social Connections: Not on file      No Known Allergies  No past surgical history on file.  No family history on file.                  Again, thank you for allowing me to participate in the care of your patient.        Sincerely,        Jarvis Justice DPM

## 2024-08-05 ENCOUNTER — TELEPHONE (OUTPATIENT)
Dept: INTERNAL MEDICINE | Facility: CLINIC | Age: 64
End: 2024-08-05
Payer: COMMERCIAL

## 2024-08-05 NOTE — TELEPHONE ENCOUNTER
Reason for Call:  Other appointment    Detailed comments: Patients daughter in law Lanie called and requested to establish care with Dr. Griffin.     Routing this message for approval per RN's instruction.    Phone Number Patient can be reached at: Other phone number:  [phone]    Best Time: Any     Can we leave a detailed message on this number? YES    Call taken on 8/5/2024 at 12:01 PM by Martha Ambrose

## 2024-08-12 ENCOUNTER — OFFICE VISIT (OUTPATIENT)
Dept: INFECTIOUS DISEASES | Facility: CLINIC | Age: 64
End: 2024-08-12
Attending: INTERNAL MEDICINE
Payer: MEDICARE

## 2024-08-12 VITALS — HEART RATE: 101 BPM | DIASTOLIC BLOOD PRESSURE: 76 MMHG | WEIGHT: 178 LBS | SYSTOLIC BLOOD PRESSURE: 103 MMHG

## 2024-08-12 DIAGNOSIS — N18.6 ESRD (END STAGE RENAL DISEASE) ON DIALYSIS (H): Primary | ICD-10-CM

## 2024-08-12 DIAGNOSIS — Z99.2 ESRD (END STAGE RENAL DISEASE) ON DIALYSIS (H): Primary | ICD-10-CM

## 2024-08-12 PROBLEM — L97.529 TYPE 2 DIABETES MELLITUS WITH LEFT DIABETIC FOOT ULCER (H): Status: ACTIVE | Noted: 2024-07-10

## 2024-08-12 PROBLEM — M86.9: Status: RESOLVED | Noted: 2024-07-10 | Resolved: 2024-08-12

## 2024-08-12 PROBLEM — M86.9 OSTEOMYELITIS OF LEFT FOOT, UNSPECIFIED TYPE (H): Status: RESOLVED | Noted: 2024-07-09 | Resolved: 2024-08-12

## 2024-08-12 PROBLEM — E11.621 TYPE 2 DIABETES MELLITUS WITH LEFT DIABETIC FOOT ULCER (H): Status: ACTIVE | Noted: 2024-07-10

## 2024-08-12 PROBLEM — E11.69: Status: RESOLVED | Noted: 2024-07-10 | Resolved: 2024-08-12

## 2024-08-12 PROCEDURE — 99214 OFFICE O/P EST MOD 30 MIN: CPT | Performed by: INTERNAL MEDICINE

## 2024-08-12 PROCEDURE — G0463 HOSPITAL OUTPT CLINIC VISIT: HCPCS | Performed by: INTERNAL MEDICINE

## 2024-08-12 ASSESSMENT — PAIN SCALES - GENERAL: PAINLEVEL: NO PAIN (0)

## 2024-08-12 NOTE — PROGRESS NOTES
[facility] IN[facility] DISE[facility] CLINIC [address] [address]  Phone: [phone]  Fax: [phone]    Patient:  Arnulfo Pritchett, Date of birth 1960  Date of Visit:  08/12/2024  Referring Provider Veto Beth  Reason for visit: DFU    Assessment & Plan      Arnulfo Pritchett is a 63 year old male with ESKD and diabetes. He has been treated with left big toe diabetic ulcer. It is currently under remission. If there is worsening of infection signs and symptoms, would request for MRI. I told him that he would need strict glucose control and follow up with podiatry and wound care.      IMPRESSION  Diabetic foot ulcer, mild IWGDF/IDSA classification 2 - resolved  Hx of acute RLE arterial occlusion s/p SFA popliteal balloon angioplasty and stenting, 5/6/2024  Type 2 diabetes mellitus   ESKD on PD  CAD s/p stents 2018  HFrEF     RECOMMENDATIONS:  Wound care and diabetes follow up.     30 minutes spent by me on the date of the encounter doing chart review, history and exam, documentation and further activities per the note    History of Present Illness     Pertinent history obtain from: patient    Arnulfo Pritchett is a 63 year old male with diabetes presenting with one week history of worsening L great toe wound, admitted from Jul 9-11. XR left foot Irregularity, possible osteolysis of the medial first phalangeal tuft  on the AP view as can be seen with osteomyelitis. Left US arterial duplex showed Slower than expected velocities throughout, etiology not demonstrated. Slow Dopplerable flow in the left anterior and posterior tibial arteries at the ankle. Tardus parvus waveforms suggest below knee disease. Discharged on cephalexin until Jul 23.     He has completed therapy and did not have any worsening toe pain or redness, swelling. No fever, chills or foot pain.     Physical Exam     /76 (BP Location: Right arm, Cuff Size: Adult Regular)   Pulse 101   Wt 80.7 kg  (178 lb)   Left foot without swelling or redness. Left big toe photos taken. No tenderness. No purulent discharge. There is a scab that has come loose.     Data   Laboratory data and imaging listed below was reviewed prior to this encounter.     CRP 18     Microbiology:  7/9 Bcx - no growth          no

## 2024-08-12 NOTE — LETTER
8/12/2024       RE: Arnulfo Pritchett  [address] [facility] [address]     Dear Colleague,    Thank you for referring your patient, Arnulfo Pritchett, to the [facility]ECTI[facility] DISEASE CLIN[facility] [place] at [facility]. Please see a copy of my visit note below.      [facility] DISE[facility] CL[facility] [place]  [address] [facility] [address] [address]  Phone: [phone]  Fax: [phone]    Patient:  Arnulfo Pritchett, Date of birth 1960  Date of Visit:  08/12/2024  Referring Provider Veto Beth  Reason for visit: DFU    Assessment & Plan     Arnulfo Pritchett is a 63 year old male with ESKD and diabetes. He has been treated with left big toe diabetic ulcer. It is currently under remission. If there is worsening of infection signs and symptoms, would request for MRI. I told him that he would need strict glucose control and follow up with podiatry and wound care.      IMPRESSION  Diabetic foot ulcer, mild IWGDF/IDSA classification 2 - resolved  Hx of acute RLE arterial occlusion s/p SFA popliteal balloon angioplasty and stenting, 5/6/2024  Type 2 diabetes mellitus   ESKD on PD  CAD s/p stents 2018  HFrEF     RECOMMENDATIONS:  Wound care and diabetes follow up.     30 minutes spent by me on the date of the encounter doing chart review, history and exam, documentation and further activities per the note    History of Present Illness    Pertinent history obtain from: patient    Arnulfo Pritchett is a 63 year old male with diabetes presenting with one week history of worsening L great toe wound, admitted from Jul 9-11. XR left foot Irregularity, possible osteolysis of the medial first phalangeal tuft  on the AP view as can be seen with osteomyelitis. Left US arterial duplex showed Slower than expected velocities throughout, etiology not demonstrated. Slow Dopplerable flow in the left anterior and posterior tibial arteries at the ankle. Tardus parvus  waveforms suggest below knee disease. Discharged on cephalexin until Jul 23.     He has completed therapy and did not have any worsening toe pain or redness, swelling. No fever, chills or foot pain.     Physical Exam    /76 (BP Location: Right arm, Cuff Size: Adult Regular)   Pulse 101   Wt 80.7 kg (178 lb)   Left foot without swelling or redness. Left big toe photos taken. No tenderness. No purulent discharge. There is a scab that has come loose.     Data  Laboratory data and imaging listed below was reviewed prior to this encounter.     CRP 18     Microbiology:  7/9 Bcx - no growth           Again, thank you for allowing me to participate in the care of your patient.      Sincerely,    DEMARIO SANCHEZ MD

## 2024-08-12 NOTE — NURSING NOTE
Chief Complaint   Patient presents with    RECHECK     Hospital follow-up      Vital signs:      BP: 103/76 Pulse: 101             Weight: 80.7 kg (178 lb)  There is no height or weight on file to calculate BMI.      Dyana Peter CMA   8/12/2024 1:01 PM

## 2024-08-21 ENCOUNTER — OFFICE VISIT (OUTPATIENT)
Dept: ORTHOPEDICS | Facility: CLINIC | Age: 64
End: 2024-08-21
Payer: MEDICARE

## 2024-08-21 DIAGNOSIS — I73.9 PAD (PERIPHERAL ARTERY DISEASE) (H): Primary | ICD-10-CM

## 2024-08-21 PROCEDURE — 99213 OFFICE O/P EST LOW 20 MIN: CPT | Performed by: PODIATRIST

## 2024-08-21 NOTE — LETTER
8/21/2024      Arnulfo Pritchett  [address] [facility] [address]      Dear Colleague,    Thank you for referring your patient, Arnulfo Pritchett, to the [facility] [place]. Please see a copy of my visit note below.    Chief Complaint:   Chief Complaint   Patient presents with     Wound Check     Left foot. Rosmeryie patient.        No Known Allergies      Subjective: Arnulfo is a 63 year old male who presents to the clinic today for a follow up of left foot ulcer. He notes that the eschar is getting better. He does have pain in the left 4th toe. This started on the top of the toe about a week or so ago. He has a previous hx of PAD and has had stenting in the right side in [place] 2/2 CLI.     Objective  Data Unavailable Data Unavailable Data Unavailable Data Unavailable Data Unavailable 0 lbs 0 oz  Non-palpable pulses noted to the left side. CRT 2 seconds. Absent pedal hair.   Healing eschar noted to the distal left hallux without s/s of infection noted.   Mottled left 4th toe with pain and epidermolysis to the distal end of the digit.     Assessment:   Encounter Diagnoses   Name Primary?     PAD (peripheral artery disease) (H24) Yes       Plan:   - Pt seen and evaluated  - He can cont to treat the eschar as he has been.   - I am concerned that the left LE is going into CLI. I ordered a STAT VLADIMIR, which is scheduled for Friday. Will also contact my colleagues in IR for further recs.         Again, thank you for allowing me to participate in the care of your patient.        Sincerely,        Merrill Saeed DPM

## 2024-08-22 NOTE — PROGRESS NOTES
Chief Complaint:   Chief Complaint   Patient presents with    Wound Check     Left foot. Vinh patient.        No Known Allergies      Subjective: Arnulfo is a 63 year old male who presents to the clinic today for a follow up of left foot ulcer. He notes that the eschar is getting better. He does have pain in the left 4th toe. This started on the top of the toe about a week or so ago. He has a previous hx of PAD and has had stenting in the right side in [place] 2/2 CLI.     Objective  Data Unavailable Data Unavailable Data Unavailable Data Unavailable Data Unavailable 0 lbs 0 oz  Non-palpable pulses noted to the left side. CRT 2 seconds. Absent pedal hair.   Healing eschar noted to the distal left hallux without s/s of infection noted.   Mottled left 4th toe with pain and epidermolysis to the distal end of the digit.     Assessment:   Encounter Diagnoses   Name Primary?    PAD (peripheral artery disease) (H24) Yes       Plan:   - Pt seen and evaluated  - He can cont to treat the eschar as he has been.   - I am concerned that the left LE is going into CLI. I ordered a STAT VLADIMIR, which is scheduled for Friday. Will also contact my colleagues in IR for further recs.

## 2024-08-23 ENCOUNTER — ANCILLARY PROCEDURE (OUTPATIENT)
Dept: ULTRASOUND IMAGING | Facility: CLINIC | Age: 64
End: 2024-08-23
Attending: PODIATRIST
Payer: MEDICARE

## 2024-08-23 DIAGNOSIS — I73.9 PAD (PERIPHERAL ARTERY DISEASE) (H): ICD-10-CM

## 2024-08-23 PROBLEM — D68.9 COAGULATION DEFECT (H): Status: ACTIVE | Noted: 2022-07-20

## 2024-08-23 PROBLEM — E87.5 HYPERKALEMIA: Status: ACTIVE | Noted: 2024-05-06

## 2024-08-23 PROBLEM — R80.9 NEPHROTIC RANGE PROTEINURIA: Status: ACTIVE | Noted: 2019-03-05

## 2024-08-23 PROBLEM — N18.6 ESRD ON PERITONEAL DIALYSIS (H): Status: ACTIVE | Noted: 2021-04-23

## 2024-08-23 PROBLEM — E61.1 IRON DEFICIENCY: Status: ACTIVE | Noted: 2022-03-31

## 2024-08-23 PROBLEM — D68.69 HYPERCOAGULABLE STATE DUE TO CHRONIC ATRIAL FIBRILLATION (H): Status: ACTIVE | Noted: 2023-04-27

## 2024-08-23 PROBLEM — Z99.2 ESRD ON PERITONEAL DIALYSIS (H): Status: ACTIVE | Noted: 2021-04-23

## 2024-08-23 PROBLEM — J90 PLEURAL EFFUSION, NOT ELSEWHERE CLASSIFIED: Status: ACTIVE | Noted: 2022-10-18

## 2024-08-23 PROBLEM — I48.20 HYPERCOAGULABLE STATE DUE TO CHRONIC ATRIAL FIBRILLATION (H): Status: ACTIVE | Noted: 2023-04-27

## 2024-08-23 PROBLEM — I48.91 ATRIAL FIBRILLATION (H): Status: ACTIVE | Noted: 2021-05-25

## 2024-08-23 PROBLEM — I25.5 ISCHEMIC CARDIOMYOPATHY: Status: ACTIVE | Noted: 2022-11-07

## 2024-08-23 PROBLEM — D50.9 IRON DEFICIENCY ANEMIA, UNSPECIFIED: Status: ACTIVE | Noted: 2023-06-29

## 2024-08-23 PROBLEM — Z99.2: Status: ACTIVE | Noted: 2021-06-23

## 2024-08-23 PROBLEM — Z85.528 PERSONAL HISTORY OF OTHER MALIGNANT NEOPLASM OF KIDNEY: Status: ACTIVE | Noted: 2022-08-30

## 2024-08-23 PROBLEM — N25.81 SECONDARY HYPERPARATHYROIDISM OF RENAL ORIGIN (H): Status: ACTIVE | Noted: 2022-08-30

## 2024-08-23 PROBLEM — I99.8 ISCHEMIC LEG: Status: ACTIVE | Noted: 2024-05-06

## 2024-08-23 PROBLEM — Z99.2: Status: ACTIVE | Noted: 2021-12-07

## 2024-08-23 PROBLEM — I50.22 CHRONIC SYSTOLIC CONGESTIVE HEART FAILURE (H): Status: ACTIVE | Noted: 2017-11-27

## 2024-08-23 PROBLEM — R04.2 HEMOPTYSIS: Status: ACTIVE | Noted: 2023-01-01

## 2024-08-23 PROBLEM — G47.30 SLEEP APNEA: Status: ACTIVE | Noted: 2024-08-23

## 2024-08-23 PROBLEM — Z99.2 TYPE 2 DM WITH HYPERTENSION AND ESRD ON DIALYSIS (H): Status: ACTIVE | Noted: 2017-03-12

## 2024-08-23 PROBLEM — Z99.2 ANEMIA DUE TO CHRONIC KIDNEY DISEASE, ON CHRONIC DIALYSIS (H): Status: ACTIVE | Noted: 2021-04-23

## 2024-08-23 PROBLEM — N28.89 RENAL MASS: Status: ACTIVE | Noted: 2021-09-02

## 2024-08-23 PROBLEM — D63.1 ANEMIA IN CHRONIC KIDNEY DISEASE: Status: ACTIVE | Noted: 2022-08-30

## 2024-08-23 PROBLEM — T50.905A HYPERGLYCEMIA, DRUG-INDUCED: Status: ACTIVE | Noted: 2019-09-27

## 2024-08-23 PROBLEM — I87.8 VENOUS STASIS: Status: ACTIVE | Noted: 2021-09-27

## 2024-08-23 PROBLEM — N18.9 ANEMIA IN CHRONIC KIDNEY DISEASE: Status: ACTIVE | Noted: 2022-08-30

## 2024-08-23 PROBLEM — I25.119 CORONARY ARTERY DISEASE INVOLVING NATIVE CORONARY ARTERY OF NATIVE HEART WITH ANGINA PECTORIS (H): Status: ACTIVE | Noted: 2017-12-03

## 2024-08-23 PROBLEM — I42.9 CARDIOMYOPATHY (H): Status: ACTIVE | Noted: 2018-02-09

## 2024-08-23 PROBLEM — Z76.82 KIDNEY TRANSPLANT CANDIDATE: Status: ACTIVE | Noted: 2021-05-21

## 2024-08-23 PROBLEM — J15.211: Status: ACTIVE | Noted: 2023-01-12

## 2024-08-23 PROBLEM — Z79.4 DIABETES MELLITUS TYPE 2, INSULIN DEPENDENT (H): Status: ACTIVE | Noted: 2017-12-27

## 2024-08-23 PROBLEM — N18.6 ANEMIA DUE TO CHRONIC KIDNEY DISEASE, ON CHRONIC DIALYSIS (H): Status: ACTIVE | Noted: 2021-04-23

## 2024-08-23 PROBLEM — E11.22 TYPE 2 DIABETES MELLITUS WITH CHRONIC KIDNEY DISEASE (H): Status: ACTIVE | Noted: 2022-07-20

## 2024-08-23 PROBLEM — M77.8 RIGHT SHOULDER TENDINITIS: Status: ACTIVE | Noted: 2023-12-08

## 2024-08-23 PROBLEM — E11.9 DIABETES MELLITUS TYPE 2, INSULIN DEPENDENT (H): Status: ACTIVE | Noted: 2017-12-27

## 2024-08-23 PROBLEM — I10 ESSENTIAL (PRIMARY) HYPERTENSION: Status: ACTIVE | Noted: 2017-12-27

## 2024-08-23 PROBLEM — M1A.39X0 CHRONIC GOUT DUE TO RENAL IMPAIRMENT OF MULTIPLE SITES WITHOUT TOPHUS: Status: ACTIVE | Noted: 2019-12-09

## 2024-08-23 PROBLEM — G47.33 OSA ON CPAP: Status: ACTIVE | Noted: 2019-12-09

## 2024-08-23 PROBLEM — Z86.73 H/O: STROKE: Status: ACTIVE | Noted: 2023-04-10

## 2024-08-23 PROBLEM — N52.9 ED (ERECTILE DYSFUNCTION): Status: ACTIVE | Noted: 2021-06-23

## 2024-08-23 PROBLEM — I21.11 ST ELEVATION MYOCARDIAL INFARCTION INVOLVING RIGHT CORONARY ARTERY (H): Status: ACTIVE | Noted: 2017-12-12

## 2024-08-23 PROBLEM — R73.9 HYPERGLYCEMIA, DRUG-INDUCED: Status: ACTIVE | Noted: 2019-09-27

## 2024-08-23 PROBLEM — I63.9 CVA (CEREBRAL VASCULAR ACCIDENT) (H): Status: ACTIVE | Noted: 2017-12-27

## 2024-08-23 PROBLEM — E08.22: Status: ACTIVE | Noted: 2021-12-07

## 2024-08-23 PROBLEM — N18.6: Status: ACTIVE | Noted: 2021-12-07

## 2024-08-23 PROBLEM — N18.6 TYPE 2 DM WITH HYPERTENSION AND ESRD ON DIALYSIS (H): Status: ACTIVE | Noted: 2017-03-12

## 2024-08-23 PROBLEM — I25.10 CORONARY ATHEROSCLEROSIS: Status: ACTIVE | Noted: 2017-12-03

## 2024-08-23 PROBLEM — E83.39 OTHER DISORDERS OF PHOSPHORUS METABOLISM: Status: ACTIVE | Noted: 2022-11-22

## 2024-08-23 PROBLEM — D63.1 ANEMIA DUE TO CHRONIC KIDNEY DISEASE, ON CHRONIC DIALYSIS (H): Status: ACTIVE | Noted: 2021-04-23

## 2024-08-23 PROBLEM — R52 PAIN, UNSPECIFIED: Status: ACTIVE | Noted: 2022-07-20

## 2024-08-23 PROBLEM — E11.22 TYPE 2 DM WITH HYPERTENSION AND ESRD ON DIALYSIS (H): Status: ACTIVE | Noted: 2017-03-12

## 2024-08-23 PROBLEM — I12.0 TYPE 2 DM WITH HYPERTENSION AND ESRD ON DIALYSIS (H): Status: ACTIVE | Noted: 2017-03-12

## 2024-08-23 PROBLEM — Z79.4: Status: ACTIVE | Noted: 2021-12-07

## 2024-08-23 PROBLEM — T78.2XXA ANAPHYLACTIC SHOCK, UNSPECIFIED, INITIAL ENCOUNTER: Status: ACTIVE | Noted: 2023-03-31

## 2024-08-23 PROCEDURE — 93922 UPR/L XTREMITY ART 2 LEVELS: CPT | Performed by: STUDENT IN AN ORGANIZED HEALTH CARE EDUCATION/TRAINING PROGRAM

## 2024-08-23 PROCEDURE — 93922 UPR/L XTREMITY ART 2 LEVELS: CPT | Mod: 52 | Performed by: STUDENT IN AN ORGANIZED HEALTH CARE EDUCATION/TRAINING PROGRAM

## 2024-08-26 ENCOUNTER — TELEPHONE (OUTPATIENT)
Dept: RADIOLOGY | Facility: CLINIC | Age: 64
End: 2024-08-26
Payer: COMMERCIAL

## 2024-08-26 DIAGNOSIS — I70.209 ATHEROSCLEROSIS OF NATIVE ARTERY OF EXTREMITY (H): Primary | ICD-10-CM

## 2024-08-26 DIAGNOSIS — R09.89 OTHER SPECIFIED SYMPTOMS AND SIGNS INVOLVING THE CIRCULATORY AND RESPIRATORY SYSTEMS: ICD-10-CM

## 2024-08-26 NOTE — TELEPHONE ENCOUNTER
I called and spoke with Arnulfo.  Dr Saeed and Dr Torres discussed the imaging results from Friday TCO2 and VLADIMIR.  No IR consult needed, pt has adequate wound healing.  Pt states he had emergency procedure for Iliac Stenting Right side at [facility] in early May 2024, he's moving to [place] and would like to establish care at the [place] for his PAD. I will get imaging pushed and review for November 6 month follow up.   Thanks,     ASandra Way, RN, BSN  Interventional Radiology Care Coordinator   Phone:  [phone]

## 2024-08-28 ENCOUNTER — DOCUMENTATION ONLY (OUTPATIENT)
Dept: VASCULAR SURGERY | Facility: CLINIC | Age: 64
End: 2024-08-28
Payer: COMMERCIAL

## 2024-08-28 NOTE — PROGRESS NOTES
Action 8.28.24 jm   Action Taken Per brandi morag from Sus[facility]: \[facility] [place] [facility]\"" faxed imaging request to [facility] for the May 2024 Uss.    Imaging received and resolved to Pacs. Notified Susu.

## 2024-09-08 ENCOUNTER — HEALTH MAINTENANCE LETTER (OUTPATIENT)
Age: 64
End: 2024-09-08

## 2024-10-07 ENCOUNTER — ANCILLARY PROCEDURE (OUTPATIENT)
Dept: GENERAL RADIOLOGY | Facility: CLINIC | Age: 64
End: 2024-10-07
Attending: PODIATRIST
Payer: MEDICARE

## 2024-10-07 ENCOUNTER — OFFICE VISIT (OUTPATIENT)
Dept: PODIATRY | Facility: CLINIC | Age: 64
End: 2024-10-07
Payer: MEDICARE

## 2024-10-07 VITALS
HEIGHT: 73 IN | BODY MASS INDEX: 23.59 KG/M2 | WEIGHT: 178 LBS | TEMPERATURE: 98.2 F | DIASTOLIC BLOOD PRESSURE: 60 MMHG | SYSTOLIC BLOOD PRESSURE: 100 MMHG

## 2024-10-07 DIAGNOSIS — E11.621 TYPE 2 DIABETES MELLITUS WITH LEFT DIABETIC FOOT ULCER (H): ICD-10-CM

## 2024-10-07 DIAGNOSIS — L97.529 TYPE 2 DIABETES MELLITUS WITH LEFT DIABETIC FOOT ULCER (H): ICD-10-CM

## 2024-10-07 DIAGNOSIS — E11.621 ULCER OF LEFT GREAT TOE DUE TO DIABETES MELLITUS (H): Primary | ICD-10-CM

## 2024-10-07 DIAGNOSIS — S91.202A TRAUMATIC LOSS OF TOENAIL OF LEFT GREAT TOE, INITIAL ENCOUNTER: ICD-10-CM

## 2024-10-07 DIAGNOSIS — E11.621 ULCER OF LEFT GREAT TOE DUE TO DIABETES MELLITUS (H): ICD-10-CM

## 2024-10-07 DIAGNOSIS — Z99.2 DEPENDENCE ON RENAL DIALYSIS (H): ICD-10-CM

## 2024-10-07 DIAGNOSIS — L97.529 ULCER OF LEFT GREAT TOE DUE TO DIABETES MELLITUS (H): Primary | ICD-10-CM

## 2024-10-07 DIAGNOSIS — L97.529 ULCER OF LEFT GREAT TOE DUE TO DIABETES MELLITUS (H): ICD-10-CM

## 2024-10-07 PROCEDURE — 73630 X-RAY EXAM OF FOOT: CPT | Mod: TC | Performed by: RADIOLOGY

## 2024-10-07 PROCEDURE — 99213 OFFICE O/P EST LOW 20 MIN: CPT | Performed by: PODIATRIST

## 2024-10-07 RX ORDER — OMEPRAZOLE 40 MG/1
40 CAPSULE, DELAYED RELEASE ORAL DAILY
COMMUNITY

## 2024-10-07 ASSESSMENT — PAIN SCALES - GENERAL: PAINLEVEL: MILD PAIN (2)

## 2024-10-07 NOTE — PROGRESS NOTES
"HPI:  Arnulfo Pritchett is a 63 year old male who is seen in consultation at the request of     Jarvis Justice MD    Pt presents for eval of:   (Onset, Location, L/R, Character, Treatments, Injury if yes)     Patient states having left toe ulcer since the beginning of July. Patient states having left toe \"spasms\" x 1 month and \"numbness\" consisently.  Then IV abx and got better and oral 10 days of abx and now still drainage.   Insulins with dexacom.     Had right femoral revasced and the left was found to be OK.      Works as a Retired.    ROS:  10 point ROS neg other than the symptoms noted above in the HPI.    Patient Active Problem List   Diagnosis    Dependence on renal dialysis (H)    Type 2 diabetes mellitus with left diabetic foot ulcer (H)    Anaphylactic shock, unspecified, initial encounter    Anemia due to chronic kidney disease, on chronic dialysis (H)    Anemia in chronic kidney disease    Atrial fibrillation (H)    Cardiomyopathy (H)    Chronic gout due to renal impairment of multiple sites without tophus    Chronic kidney disease, stage IV (severe) (H)    Coagulation defect (H)    Coronary artery disease involving native coronary artery of native heart with angina pectoris (H)    Coronary atherosclerosis    Dyslipidemia    ED (erectile dysfunction)    ESRD on peritoneal dialysis (H)    Essential (primary) hypertension    Gout    H/O: stroke    Hemoptysis    Hypercoagulable state due to chronic atrial fibrillation (H)    Hyperglycemia, drug-induced    Hyperkalemia    Mixed hypercholesterolemia and hypertriglyceridemia    Hypertensive urgency    Iron deficiency    Iron deficiency anemia, unspecified    Ischemic cardiomyopathy    Ischemic leg    Ischemic stroke (H)    CVA (cerebral vascular accident) (H)    Kidney transplant candidate    Left homonymous hemianopsia    Nephrotic range proteinuria    TALI on CPAP    Sleep apnea    Other disorders of phosphorus metabolism    Other specified abnormal findings of " blood chemistry    Pain, unspecified    Peritoneal dialysis catheter in situ (H)    Personal history of other malignant neoplasm of kidney    Pleural effusion, not elsewhere classified    Renal mass    Right shoulder tendinitis    Secondary hyperparathyroidism of renal origin (H)    ST elevation myocardial infarction involving right coronary artery (H)    Chronic systolic congestive heart failure (H)    Staphylococcus aureus pneumonia (H)    Systolic heart failure (H)    Venous stasis    Diabetes mellitus due to underlying condition, controlled, with chronic kidney disease on chronic dialysis, with long-term current use of insulin (H)    Diabetes mellitus type 2, insulin dependent (H)    Type 2 diabetes mellitus with chronic kidney disease (H)    Type 2 diabetes mellitus (H)    Type 2 DM with hypertension and ESRD on dialysis (H)       PAST MEDICAL HISTORY:   Past Medical History:   Diagnosis Date    CAD (coronary artery disease)     Chronic systolic heart failure (H)     ESRD (end stage renal disease) on dialysis (H)     Gastroesophageal reflux disease without esophagitis     Gout     HLD (hyperlipidemia)     TALI (obstructive sleep apnea)     PAD (peripheral artery disease) (H)     S/p RLE stenting of SFA/popliteal arteries    Type 2 diabetes mellitus with diabetic neuropathy (H)         PAST SURGICAL HISTORY: No past surgical history on file.     MEDICATIONS:   Current Outpatient Medications:     acetaminophen (TYLENOL) 325 MG tablet, Take 325-650 mg by mouth every 4 hours as needed, Disp: , Rfl:     allopurinol (ZYLOPRIM) 100 MG tablet, Take 200 mg by mouth every evening, Disp: , Rfl:     atorvastatin (LIPITOR) 40 MG tablet, Take 40 mg by mouth daily, Disp: , Rfl:     calcitRIOL (ROCALTROL) 0.25 MCG capsule, Take 0.25 mcg by mouth Every Mon, Wed, Fri Morning, Disp: , Rfl:     carvedilol (COREG) 25 MG tablet, Take 12.5 mg by mouth 2 times daily (with meals), Disp: , Rfl:     cephALEXin (KEFLEX) 250 MG capsule,  Take 1 capsule (250 mg) by mouth 2 times daily for 10 days., Disp: 20 capsule, Rfl: 0    cinacalcet (SENSIPAR) 60 MG tablet, Take 60 mg by mouth daily, Disp: , Rfl:     gentamicin (GARAMYCIN) 0.1 % external cream, Apply topically daily, Disp: , Rfl:     insulin glargine (LANTUS PEN) 100 UNIT/ML pen, Inject 39 Units subcutaneously., Disp: , Rfl:     melatonin 5 MG tablet, Take 5 mg by mouth at bedtime, Disp: , Rfl:     omeprazole (PRILOSEC) 40 MG DR capsule, Take 40 mg by mouth daily., Disp: , Rfl:     PLAVIX 75 MG tablet, Take 75 mg by mouth every evening, Disp: , Rfl:     sacubitril-valsartan (ENTRESTO) 49-51 MG per tablet, Take 1 tablet by mouth 2 times daily, Disp: , Rfl:     SEVELAMER CARBONATE PO, Take 800 mg by mouth 3 times daily (with meals), Disp: , Rfl:     torsemide (DEMADEX) 100 MG tablet, Take 100 mg by mouth every morning, Disp: , Rfl:     warfarin ANTICOAGULANT (COUMADIN) 1 MG tablet, Take 5 mg by mouth Every Mon, Wed, Fri Morning 7.5 mg all other days, Disp: , Rfl:     amoxicillin-clavulanate (AUGMENTIN) 875-125 MG tablet, Take 1 tablet by mouth 2 times daily (Patient not taking: Reported on 10/7/2024), Disp: 28 tablet, Rfl: 0    calcium carbonate antacid 1000 MG CHEW, Take 1,000 mg by mouth 4 times daily as needed (Patient not taking: Reported on 10/7/2024), Disp: , Rfl:     insulin aspart (NOVOLOG PEN) 100 UNIT/ML pen, Inject 1-7 Units subcutaneously 3 times daily (before meals) (Patient not taking: Reported on 10/7/2024), Disp: , Rfl:     insulin aspart (NOVOLOG PEN) 100 UNIT/ML pen, Inject 1-5 Units subcutaneously at bedtime (Patient not taking: Reported on 10/7/2024), Disp: , Rfl:     pantoprazole (PROTONIX) 40 MG EC tablet, Take 40 mg by mouth daily Every am before meals (Patient not taking: Reported on 10/7/2024), Disp: , Rfl:     Sennosides-Docusate Sodium (SENOKOT S PO), Take 2 tablets by mouth 2 times daily as needed (Patient not taking: Reported on 10/7/2024), Disp: , Rfl:      ALLERGIES:   No Known Allergies     SOCIAL HISTORY:   Social History     Socioeconomic History    Marital status:      Spouse name: Not on file    Number of children: Not on file    Years of education: Not on file    Highest education level: Not on file   Occupational History    Not on file   Tobacco Use    Smoking status: Former     Types: Cigarettes    Smokeless tobacco: Never   Substance and Sexual Activity    Alcohol use: Not Currently     Comment: quit 20 years    Drug use: Not on file    Sexual activity: Not on file   Other Topics Concern    Not on file   Social History Narrative    Not on file     Social Determinants of Health     Financial Resource Strain: Low Risk  (11/24/2023)    [facility]    Overall Financial Resource Strain (CARDIA)     Difficulty of Paying Living Expenses: Not very hard   Food Insecurity: No Food Insecurity (5/7/2024)    Received from [facility]    Hunger Vital Sign     Worried About Running Out of Food in the Last Year: Never true     Ran Out of Food in the Last Year: Never true   Transportation Needs: No Transportation Needs (5/7/2024)    Received from [facility]    PRAPARE - Transportation     Lack of Transportation (Medical): No     Lack of Transportation (Non-Medical): No   Physical Activity: Not on file   Stress: Not on file   Social Connections: Not on file   Interpersonal Safety: Not At Risk (5/7/2024)    Received from [facility]    EH IP Custom IPV     Do you feel UNSAFE in any of your personal relationships with your family members or any other acquaintances?: No   Housing Stability: Low Risk  (5/7/2024)    Received from [facility]    Housing Stability Vital Sign     Unable to Pay for Housing in the Last Year: No     Number of Times Moved in  "the Last Year: 0     Homeless in the Last Year: No     FAMILY HISTORY: No family history on file.      EXAM:Vitals: /60 (BP Location: Left arm, Cuff Size: Adult Regular)   Temp 98.2  F (36.8  C) (Temporal)   Ht 1.854 m (6' 1\")   Wt 80.7 kg (178 lb)   BMI 23.48 kg/m    BMI= Body mass index is 23.48 kg/m .    General appearance: Patient is alert and fully cooperative with history & exam.  No sign of distress is noted during the visit.     Psychiatric: Affect is pleasant & appropriate.  Patient appears motivated to improve health.     Respiratory: Breathing is regular & unlabored while sitting.     HEENT: Hearing is intact to spoken word.  Speech is clear.  No gross evidence of visual impairment that would impact ambulation.     Vascular: DP & PT pulses are not palpable bilateral.  Temperature warm to cool proximal to distal.  CFT is about 5 seconds bilateral lower extremities.  Mild generalized edema bilateral lower extremities but less than 5 mm of pitting in nature.  Subtle varicosities about the leg with some venous staining about the anterior legs.  No wounds about the legs.  No hair growth bilateral.  Minor varicosities are noted.     Neurologic: Lower extremity sensation is intact to light touch.  Protective threshold is mildly diminished bilateral lower extremities tested with a 5.07 monofilament +6/10 applications.  Digits are challenged but the plantar foot remains intact.  Plantar reflexes intact bilateral.    Dermatologic: Skin is intact to both lower extremities with moderately diminished texture, turgor and tone about the integument.  The left hallux nail is poorly attached to the nailbed and remains moist after debridement.  Any hematoma is easily removed today.  No probing to muscle tendon or bone.    Musculoskeletal: Patient is ambulatory without assistive device or brace.  No gross ankle deformity noted.  No foot or ankle joint effusion is noted.        Hemoglobin A1C (%)   Date Value "   07/09/2024 5.9 (H)     Creatinine (mg/dL)   Date Value   07/11/2024 7.51 (H)   07/10/2024 7.01 (H)   07/09/2024 7.62 (H)     Photo taken 10/7/24:       Imaging:  VLADIMIR and arterial ultrasound and TcPO2 7/9/2024 and 8/23/2024 demonstrates what appears to be adequate arterial inflow for healing.    Exam Date Exam Time Accession # Performing Department Results    7/9/24 11:00 AM [id number] [facility]      PACS Images     Show images for US Lower Extremity Arterial Duplex Left     Study Result    Narrative & Impression   ULTRASOUND LOWER EXTREMITY ARTERIAL DUPLEX LEFT 7/9/2024 11:00 AM     CLINICAL HISTORY: hx of severe PAD, increased pain.      COMPARISONS: None available.     REFERRING PROVIDER: SHIMON LOPEZ     TECHNIQUE: Left leg arteries evaluated with grayscale, color Doppler,  and spectral pulsed wave Doppler ultrasound.     FINDINGS:   LEFT:  Common femoral artery: 45/0 cm/s, triphasic  Profundus femoral artery: 44/0 cm/s, triphasic     Superficial femoral artery, origin: 28/0 cm/s, biphasic  Superficial femoral artery, mid thigh: 32/0 cm/s, biphasic  Superficial femoral artery, distal thigh: 13/4 cm/s, arterial  monophasic     Popliteal artery: 9/3 cm/s, arterial monophasic     Posterior tibial artery, ankle: 13/8 cm/s, tardus parvus  Anterior tibial artery, ankle: 24/10 cm/s, tardus parvus                                                                      IMPRESSION:  1. Slower than expected velocities throughout, etiology not  demonstrated.     2. Slow Dopplerable flow in the left anterior and posterior tibial  arteries at the ankle. Tardus parvus waveforms suggest below knee  disease.     I have personally reviewed the examination and initial interpretation  and I agree with the findings.     DERIC PINK MD      Exam Date Exam Time Accession # Performing Department Results    8/23/24 12:27 PM [id number]     Show images for US VLADIMIR Doppler No Exercise 1-2 Levels Bilateral     Study Result    Narrative & Impression   Exam: Bilateral lower extremity resting ankle brachial indices, toe  brachial indices and pulse volume recordings dated 8/23/2024 12:27 PM     Comparison study: None     Clinical history: possible CLI of the left foot; PAD (peripheral  artery disease) (H24) recent right common femoral artery stenting, now  has left big toe wound and discoloration on third and fourth toe on  the left.     Ordering provider: ESTELLA MEAD     Technique: Bilateral lower extremity resting ankle brachial indices  obtained. In addition, pulse volume recordings were performed     Findings:     Right:       Arm: 144 mmHg   PT at ankle: Noncompressive mmHg   DP at foot: 154 mmHg   Toe pressure 130 mmHg      VLADIMIR: 1.07   TBI: 0.96     Right pulse volume recordings or VPR:       High thigh: Normal   Lower thigh: Normal   Proximal calf: Normal   Ankle: Mildly abnormal      1st Digit PPG: Severely abnormal     Left:      Arm: 132 mmHg   PT at ankle: Non compressible mmHg   DP at foot: 141 mmHg   Toe pressure Unable to visualize waveform to record pressure       VLADIMIR: 0.98   TBI: Unable to visualize waveform to record pressure      Left pulse volume recordings or VPR:      High thigh: Normal   Lower thigh: Normal   Proximal calf: Severely abnormal   Ankle: Severely abnormal      1st Digit PPG: Severely abnormal                                                                      Impression:      Right leg: Resting VLADIMIR is 1.07. Normal.     Left leg: Resting VLADIMIR is 0.98. Borderline (0.91 to 0.99).     Bilateral ABIs could be falsely elevated due to decreased vessel  compressibility, considering left leg signals being monophasic.      Right TBI: 0.96  Left TBI: Could not be calculated due to non visualized waveforms left  big toe     VPRs:      Right: Mildly abnormal at the ankle and severely abnormal at the first  digit,  which could be due to runoff disease      Left: Severely abnormal at the calf, ankle and first digit, which  could be due to distal SFA/ popliteal arterial disease         ASSESSMENT:       ICD-10-CM    1. Ulcer of left great toe due to diabetes mellitus (H)  E11.621 XR Foot Left G/E 3 Views    L97.529 cephALEXin (KEFLEX) 250 MG capsule     Ankle/Foot Bracing Supplies Order Post-op Shoe; Left      2. Type 2 diabetes mellitus with left diabetic foot ulcer (H)  E11.621     L97.529       3. Dependence on renal dialysis (H)  Z99.2       4. Traumatic loss of toenail of left great toe, initial encounter  S91.202A            PLAN:  Reviewed patient's chart in [facility].      10/7/2024   Interpreted VLADIMIR and arterial ultrasound dated July and August 2024  He does have arterial disease but appears to be adequate for healing.    His entire left hallux nail plate is poorly attached with a subungual hematoma.  Any unattached portion of the nail was debrided today with a nail nipper and a sterile dressing was applied.  Recommend Betadine once daily regular bathing but no soaking or submersion.  Once is dry and no longer staining that a regular sock.  Follow-up in 10-14 days.  Begin oral antibiotic Keflex renal based dosing and this was discussed with the pharmacist for renal dosing.    Patient does require peritoneal dialysis at home daily.  Patient understands this may or may not heal due to his arterial insufficiency and renal insufficiency.  Interpreted radiographs today demonstrating no obvious signs of cortical disruption or loss of trabeculation.  No obvious osteomyelitis.  We did however discuss the importance of following this and the risk of developing osteomyelitis as this great toe wound has been a problem for him now for months.  Dispensed a postoperative shoe for the left foot.      Daniel Siddiqui DPM

## 2024-10-07 NOTE — LETTER
"10/7/2024      Arnulfo Pritchett  [address] [facility] [place] [address]      Dear Colleague,    Thank you for referring your patient, Arnulfo Pritchett, to the [facility]. Please see a copy of my visit note below.    HPI:  Arnulfo Pritchett is a 63 year old male who is seen in consultation at the request of     Jarvis Justice MD    Pt presents for eval of:   (Onset, Location, L/R, Character, Treatments, Injury if yes)     Patient states having left toe ulcer since the beginning of July. Patient states having left toe \"spasms\" x 1 month and \"numbness\" consisently.  Then IV abx and got better and oral 10 days of abx and now still drainage.   Insulins with dexacom.     Had right femoral revasced and the left was found to be OK.      Works as a Retired.    ROS:  10 point ROS neg other than the symptoms noted above in the HPI.    Patient Active Problem List   Diagnosis     Dependence on renal dialysis (H)     Type 2 diabetes mellitus with left diabetic foot ulcer (H)     Anaphylactic shock, unspecified, initial encounter     Anemia due to chronic kidney disease, on chronic dialysis (H)     Anemia in chronic kidney disease     Atrial fibrillation (H)     Cardiomyopathy (H)     Chronic gout due to renal impairment of multiple sites without tophus     Chronic kidney disease, stage IV (severe) (H)     Coagulation defect (H)     Coronary artery disease involving native coronary artery of native heart with angina pectoris (H)     Coronary atherosclerosis     Dyslipidemia     ED (erectile dysfunction)     ESRD on peritoneal dialysis (H)     Essential (primary) hypertension     Gout     H/O: stroke     Hemoptysis     Hypercoagulable state due to chronic atrial fibrillation (H)     Hyperglycemia, drug-induced     Hyperkalemia     Mixed hypercholesterolemia and hypertriglyceridemia     Hypertensive urgency     Iron deficiency     Iron deficiency anemia, unspecified     Ischemic cardiomyopathy     Ischemic leg     " Ischemic stroke (H)     CVA (cerebral vascular accident) (H)     Kidney transplant candidate     Left homonymous hemianopsia     Nephrotic range proteinuria     TALI on CPAP     Sleep apnea     Other disorders of phosphorus metabolism     Other specified abnormal findings of blood chemistry     Pain, unspecified     Peritoneal dialysis catheter in situ (H)     Personal history of other malignant neoplasm of kidney     Pleural effusion, not elsewhere classified     Renal mass     Right shoulder tendinitis     Secondary hyperparathyroidism of renal origin (H)     ST elevation myocardial infarction involving right coronary artery (H)     Chronic systolic congestive heart failure (H)     Staphylococcus aureus pneumonia (H)     Systolic heart failure (H)     Venous stasis     Diabetes mellitus due to underlying condition, controlled, with chronic kidney disease on chronic dialysis, with long-term current use of insulin (H)     Diabetes mellitus type 2, insulin dependent (H)     Type 2 diabetes mellitus with chronic kidney disease (H)     Type 2 diabetes mellitus (H)     Type 2 DM with hypertension and ESRD on dialysis (H)       PAST MEDICAL HISTORY:   Past Medical History:   Diagnosis Date     CAD (coronary artery disease)      Chronic systolic heart failure (H)      ESRD (end stage renal disease) on dialysis (H)      Gastroesophageal reflux disease without esophagitis      Gout      HLD (hyperlipidemia)      TALI (obstructive sleep apnea)      PAD (peripheral artery disease) (H)     S/p RLE stenting of SFA/popliteal arteries     Type 2 diabetes mellitus with diabetic neuropathy (H)         PAST SURGICAL HISTORY: No past surgical history on file.     MEDICATIONS:   Current Outpatient Medications:      acetaminophen (TYLENOL) 325 MG tablet, Take 325-650 mg by mouth every 4 hours as needed, Disp: , Rfl:      allopurinol (ZYLOPRIM) 100 MG tablet, Take 200 mg by mouth every evening, Disp: , Rfl:      atorvastatin (LIPITOR) 40  MG tablet, Take 40 mg by mouth daily, Disp: , Rfl:      calcitRIOL (ROCALTROL) 0.25 MCG capsule, Take 0.25 mcg by mouth Every Mon, Wed, Fri Morning, Disp: , Rfl:      carvedilol (COREG) 25 MG tablet, Take 12.5 mg by mouth 2 times daily (with meals), Disp: , Rfl:      cephALEXin (KEFLEX) 250 MG capsule, Take 1 capsule (250 mg) by mouth 2 times daily for 10 days., Disp: 20 capsule, Rfl: 0     cinacalcet (SENSIPAR) 60 MG tablet, Take 60 mg by mouth daily, Disp: , Rfl:      gentamicin (GARAMYCIN) 0.1 % external cream, Apply topically daily, Disp: , Rfl:      insulin glargine (LANTUS PEN) 100 UNIT/ML pen, Inject 39 Units subcutaneously., Disp: , Rfl:      melatonin 5 MG tablet, Take 5 mg by mouth at bedtime, Disp: , Rfl:      omeprazole (PRILOSEC) 40 MG DR capsule, Take 40 mg by mouth daily., Disp: , Rfl:      PLAVIX 75 MG tablet, Take 75 mg by mouth every evening, Disp: , Rfl:      sacubitril-valsartan (ENTRESTO) 49-51 MG per tablet, Take 1 tablet by mouth 2 times daily, Disp: , Rfl:      SEVELAMER CARBONATE PO, Take 800 mg by mouth 3 times daily (with meals), Disp: , Rfl:      torsemide (DEMADEX) 100 MG tablet, Take 100 mg by mouth every morning, Disp: , Rfl:      warfarin ANTICOAGULANT (COUMADIN) 1 MG tablet, Take 5 mg by mouth Every Mon, Wed, Fri Morning 7.5 mg all other days, Disp: , Rfl:      amoxicillin-clavulanate (AUGMENTIN) 875-125 MG tablet, Take 1 tablet by mouth 2 times daily (Patient not taking: Reported on 10/7/2024), Disp: 28 tablet, Rfl: 0     calcium carbonate antacid 1000 MG CHEW, Take 1,000 mg by mouth 4 times daily as needed (Patient not taking: Reported on 10/7/2024), Disp: , Rfl:      insulin aspart (NOVOLOG PEN) 100 UNIT/ML pen, Inject 1-7 Units subcutaneously 3 times daily (before meals) (Patient not taking: Reported on 10/7/2024), Disp: , Rfl:      insulin aspart (NOVOLOG PEN) 100 UNIT/ML pen, Inject 1-5 Units subcutaneously at bedtime (Patient not taking: Reported on 10/7/2024), Disp: , Rfl:       pantoprazole (PROTONIX) 40 MG EC tablet, Take 40 mg by mouth daily Every am before meals (Patient not taking: Reported on 10/7/2024), Disp: , Rfl:      Sennosides-Docusate Sodium (SENOKOT S PO), Take 2 tablets by mouth 2 times daily as needed (Patient not taking: Reported on 10/7/2024), Disp: , Rfl:      ALLERGIES:  No Known Allergies     SOCIAL HISTORY:   Social History     Socioeconomic History     Marital status:      Spouse name: Not on file     Number of children: Not on file     Years of education: Not on file     Highest education level: Not on file   Occupational History     Not on file   Tobacco Use     Smoking status: Former     Types: Cigarettes     Smokeless tobacco: Never   Substance and Sexual Activity     Alcohol use: Not Currently     Comment: quit 20 years     Drug use: Not on file     Sexual activity: Not on file   Other Topics Concern     Not on file   Social History Narrative     Not on file     Social Determinants of Health     Financial Resource Strain: Low Risk  (11/24/2023)    [facility]    Overall Financial Resource Strain (CARDIA)      Difficulty of Paying Living Expenses: Not very hard   Food Insecurity: No Food Insecurity (5/7/2024)    [facility]    Hunger Vital Sign      Worried About Running Out of Food in the Last Year: Never true      Ran Out of Food in the Last Year: Never true   Transportation Needs: No Transportation Needs (5/7/2024)    Received from [facility]    PRAPARE - Transportation      Lack of Transportation (Medical): No      Lack of Transportation (Non-Medical): No   Physical Activity: Not on file   Stress: Not on file   Social Connections: Not on file   Interpersonal Safety: Not At Risk (5/7/2024)    Received from [facility]  "IPV      Do you feel UNSAFE in any of your personal relationships with your family members or any other acquaintances?: No   Housing Stability: Low Risk  (5/7/2024)    [facility]    Housing Stability Vital Sign      Unable to Pay for Housing in the Last Year: No      Number of Times Moved in the Last Year: 0      Homeless in the Last Year: No     FAMILY HISTORY: No family history on file.      EXAM:Vitals: /60 (BP Location: Left arm, Cuff Size: Adult Regular)   Temp 98.2  F (36.8  C) (Temporal)   Ht 1.854 m (6' 1\")   Wt 80.7 kg (178 lb)   BMI 23.48 kg/m    BMI= Body mass index is 23.48 kg/m .    General appearance: Patient is alert and fully cooperative with history & exam.  No sign of distress is noted during the visit.     Psychiatric: Affect is pleasant & appropriate.  Patient appears motivated to improve health.     Respiratory: Breathing is regular & unlabored while sitting.     HEENT: Hearing is intact to spoken word.  Speech is clear.  No gross evidence of visual impairment that would impact ambulation.     Vascular: DP & PT pulses are not palpable bilateral.  Temperature warm to cool proximal to distal.  CFT is about 5 seconds bilateral lower extremities.  Mild generalized edema bilateral lower extremities but less than 5 mm of pitting in nature.  Subtle varicosities about the leg with some venous staining about the anterior legs.  No wounds about the legs.  No hair growth bilateral.  Minor varicosities are noted.     Neurologic: Lower extremity sensation is intact to light touch.  Protective threshold is mildly diminished bilateral lower extremities tested with a 5.07 monofilament +6/10 applications.  Digits are challenged but the plantar foot remains intact.  Plantar reflexes intact bilateral.    Dermatologic: Skin is intact to both lower extremities with moderately diminished texture, turgor and tone about the integument.  The left hallux nail is " poorly attached to the nailbed and remains moist after debridement.  Any hematoma is easily removed today.  No probing to muscle tendon or bone.    Musculoskeletal: Patient is ambulatory without assistive device or brace.  No gross ankle deformity noted.  No foot or ankle joint effusion is noted.        Hemoglobin A1C (%)   Date Value   07/09/2024 5.9 (H)     Creatinine (mg/dL)   Date Value   07/11/2024 7.51 (H)   07/10/2024 7.01 (H)   07/09/2024 7.62 (H)     Photo taken 10/7/24:       Imaging:  VLADIMIR and arterial ultrasound and TcPO2 7/9/2024 and 8/23/2024 demonstrates what appears to be adequate arterial inflow for healing.    Exam Date Exam Time Accession # Performing Department Results    7/9/24 11:00 AM [id number] [facility]      PACS Images     Show images for US Lower Extremity Arterial Duplex Left     Study Result    Narrative & Impression   ULTRASOUND LOWER EXTREMITY ARTERIAL DUPLEX LEFT 7/9/2024 11:00 AM     CLINICAL HISTORY: hx of severe PAD, increased pain.      COMPARISONS: None available.     REFERRING PROVIDER: SHIMON LOPEZ     TECHNIQUE: Left leg arteries evaluated with grayscale, color Doppler,  and spectral pulsed wave Doppler ultrasound.     FINDINGS:   LEFT:  Common femoral artery: 45/0 cm/s, triphasic  Profundus femoral artery: 44/0 cm/s, triphasic     Superficial femoral artery, origin: 28/0 cm/s, biphasic  Superficial femoral artery, mid thigh: 32/0 cm/s, biphasic  Superficial femoral artery, distal thigh: 13/4 cm/s, arterial  monophasic     Popliteal artery: 9/3 cm/s, arterial monophasic     Posterior tibial artery, ankle: 13/8 cm/s, tardus parvus  Anterior tibial artery, ankle: 24/10 cm/s, tardus parvus                                                                      IMPRESSION:  1. Slower than expected velocities throughout, etiology not  demonstrated.     2. Slow Dopplerable flow in the left anterior and posterior tibial  arteries at the ankle. Tardus  parvus waveforms suggest below knee  disease.     I have personally reviewed the examination and initial interpretation  and I agree with the findings.     DERIC PINK MD      Exam Date Exam Time Accession # Performing Department Results    8/23/24 12:27 PM [id number] [facility]      PACS Images     Show images for US VLADIMIR Doppler No Exercise 1-2 Levels Bilateral     Study Result    Narrative & Impression   Exam: Bilateral lower extremity resting ankle brachial indices, toe  brachial indices and pulse volume recordings dated 8/23/2024 12:27 PM     Comparison study: None     Clinical history: possible CLI of the left foot; PAD (peripheral  artery disease) (H24) recent right common femoral artery stenting, now  has left big toe wound and discoloration on third and fourth toe on  the left.     Ordering provider: ESTELLA MEAD     Technique: Bilateral lower extremity resting ankle brachial indices  obtained. In addition, pulse volume recordings were performed     Findings:     Right:       Arm: 144 mmHg   PT at ankle: Noncompressive mmHg   DP at foot: 154 mmHg   Toe pressure 130 mmHg      VLADIMIR: 1.07   TBI: 0.96     Right pulse volume recordings or VPR:       High thigh: Normal   Lower thigh: Normal   Proximal calf: Normal   Ankle: Mildly abnormal      1st Digit PPG: Severely abnormal     Left:      Arm: 132 mmHg   PT at ankle: Non compressible mmHg   DP at foot: 141 mmHg   Toe pressure Unable to visualize waveform to record pressure       VLADIMIR: 0.98   TBI: Unable to visualize waveform to record pressure      Left pulse volume recordings or VPR:      High thigh: Normal   Lower thigh: Normal   Proximal calf: Severely abnormal   Ankle: Severely abnormal      1st Digit PPG: Severely abnormal                                                                      Impression:      Right leg: Resting VLADIMIR is 1.07. Normal.     Left leg: Resting VLADIMIR is 0.98. Borderline (0.91 to  0.99).     Bilateral ABIs could be falsely elevated due to decreased vessel  compressibility, considering left leg signals being monophasic.      Right TBI: 0.96  Left TBI: Could not be calculated due to non visualized waveforms left  big toe     VPRs:      Right: Mildly abnormal at the ankle and severely abnormal at the first  digit, which could be due to runoff disease      Left: Severely abnormal at the calf, ankle and first digit, which  could be due to distal SFA/ popliteal arterial disease         ASSESSMENT:       ICD-10-CM    1. Ulcer of left great toe due to diabetes mellitus (H)  E11.621 XR Foot Left G/E 3 Views    L97.529 cephALEXin (KEFLEX) 250 MG capsule     Ankle/Foot Bracing Supplies Order Post-op Shoe; Left      2. Type 2 diabetes mellitus with left diabetic foot ulcer (H)  E11.621     L97.529       3. Dependence on renal dialysis (H)  Z99.2       4. Traumatic loss of toenail of left great toe, initial encounter  S91.202A            PLAN:  Reviewed patient[facility] chart in [facility].      10/7/2024   Interpreted VLADIMIR and arterial ultrasound dated July and August 2024  He does have arterial disease but appears to be adequate for healing.    His entire left hallux nail plate is poorly attached with a subungual hematoma.  Any unattached portion of the nail was debrided today with a nail nipper and a sterile dressing was applied.  Recommend Betadine once daily regular bathing but no soaking or submersion.  Once is dry and no longer staining that a regular sock.  Follow-up in 10-14 days.  Begin oral antibiotic Keflex renal based dosing and this was discussed with the pharmacist for renal dosing.    Patient does require peritoneal dialysis at home daily.  Patient understands this may or may not heal due to his arterial insufficiency and renal insufficiency.  Interpreted radiographs today demonstrating no obvious signs of cortical disruption or loss of trabeculation.  No obvious osteomyelitis.  We did however discuss  the importance of following this and the risk of developing osteomyelitis as this great toe wound has been a problem for him now for months.  Dispensed a postoperative shoe for the left foot.      Daniel Siddiqui DPM          Again, thank you for allowing me to participate in the care of your patient.        Sincerely,        Daniel Siddiqui DPM

## 2024-10-08 ENCOUNTER — ANTICOAGULATION THERAPY VISIT (OUTPATIENT)
Dept: ANTICOAGULATION | Facility: CLINIC | Age: 64
End: 2024-10-08

## 2024-10-08 ENCOUNTER — OFFICE VISIT (OUTPATIENT)
Dept: INTERNAL MEDICINE | Facility: CLINIC | Age: 64
End: 2024-10-08
Payer: MEDICARE

## 2024-10-08 VITALS
HEIGHT: 72 IN | HEART RATE: 103 BPM | RESPIRATION RATE: 18 BRPM | OXYGEN SATURATION: 99 % | WEIGHT: 173.5 LBS | TEMPERATURE: 97.9 F | BODY MASS INDEX: 23.5 KG/M2 | DIASTOLIC BLOOD PRESSURE: 62 MMHG | SYSTOLIC BLOOD PRESSURE: 118 MMHG

## 2024-10-08 DIAGNOSIS — Z99.2: ICD-10-CM

## 2024-10-08 DIAGNOSIS — Z79.4: ICD-10-CM

## 2024-10-08 DIAGNOSIS — D63.1 ANEMIA DUE TO CHRONIC KIDNEY DISEASE, ON CHRONIC DIALYSIS (H): ICD-10-CM

## 2024-10-08 DIAGNOSIS — E78.2 MIXED HYPERCHOLESTEROLEMIA AND HYPERTRIGLYCERIDEMIA: Primary | ICD-10-CM

## 2024-10-08 DIAGNOSIS — E08.22: ICD-10-CM

## 2024-10-08 DIAGNOSIS — N18.6 ESRD ON PERITONEAL DIALYSIS (H): ICD-10-CM

## 2024-10-08 DIAGNOSIS — N18.6 ANEMIA DUE TO CHRONIC KIDNEY DISEASE, ON CHRONIC DIALYSIS (H): ICD-10-CM

## 2024-10-08 DIAGNOSIS — I48.11 LONGSTANDING PERSISTENT ATRIAL FIBRILLATION (H): ICD-10-CM

## 2024-10-08 DIAGNOSIS — I63.9 CVA (CEREBRAL VASCULAR ACCIDENT) (H): ICD-10-CM

## 2024-10-08 DIAGNOSIS — Z86.73 H/O: STROKE: ICD-10-CM

## 2024-10-08 DIAGNOSIS — I25.119 CORONARY ARTERY DISEASE INVOLVING NATIVE CORONARY ARTERY OF NATIVE HEART WITH ANGINA PECTORIS (H): ICD-10-CM

## 2024-10-08 DIAGNOSIS — Z79.01 LONG TERM CURRENT USE OF ANTICOAGULANT THERAPY: ICD-10-CM

## 2024-10-08 DIAGNOSIS — N18.4 CHRONIC KIDNEY DISEASE, STAGE IV (SEVERE) (H): ICD-10-CM

## 2024-10-08 DIAGNOSIS — Z99.2 ESRD ON PERITONEAL DIALYSIS (H): ICD-10-CM

## 2024-10-08 DIAGNOSIS — I48.91 ATRIAL FIBRILLATION, UNSPECIFIED TYPE (H): ICD-10-CM

## 2024-10-08 DIAGNOSIS — M79.675 PAIN AROUND TOENAIL, LEFT FOOT: ICD-10-CM

## 2024-10-08 DIAGNOSIS — I73.9 PAD (PERIPHERAL ARTERY DISEASE) (H): ICD-10-CM

## 2024-10-08 DIAGNOSIS — Z79.01 LONG TERM CURRENT USE OF ANTICOAGULANT THERAPY: Primary | ICD-10-CM

## 2024-10-08 DIAGNOSIS — N18.6: ICD-10-CM

## 2024-10-08 DIAGNOSIS — Z99.2 ANEMIA DUE TO CHRONIC KIDNEY DISEASE, ON CHRONIC DIALYSIS (H): ICD-10-CM

## 2024-10-08 LAB — INR BLD: 4.8 (ref 0.9–1.1)

## 2024-10-08 PROCEDURE — 85610 PROTHROMBIN TIME: CPT | Performed by: INTERNAL MEDICINE

## 2024-10-08 PROCEDURE — 99214 OFFICE O/P EST MOD 30 MIN: CPT | Mod: 25 | Performed by: INTERNAL MEDICINE

## 2024-10-08 PROCEDURE — G0438 PPPS, INITIAL VISIT: HCPCS | Performed by: INTERNAL MEDICINE

## 2024-10-08 PROCEDURE — 36416 COLLJ CAPILLARY BLOOD SPEC: CPT | Performed by: INTERNAL MEDICINE

## 2024-10-08 RX ORDER — ACETAMINOPHEN AND CODEINE PHOSPHATE 300; 30 MG/1; MG/1
1 TABLET ORAL EVERY 6 HOURS PRN
Qty: 10 TABLET | Refills: 0 | Status: ON HOLD | OUTPATIENT
Start: 2024-10-08 | End: 2024-10-15

## 2024-10-08 RX ORDER — ACETAMINOPHEN AND CODEINE PHOSPHATE 300; 30 MG/1; MG/1
1 TABLET ORAL EVERY 6 HOURS PRN
Qty: 10 TABLET | Refills: 0 | Status: SHIPPED | OUTPATIENT
Start: 2024-10-08 | End: 2024-10-08

## 2024-10-08 RX ORDER — WARFARIN SODIUM 5 MG/1
TABLET ORAL DAILY
COMMUNITY
Start: 2024-10-08

## 2024-10-08 SDOH — HEALTH STABILITY: PHYSICAL HEALTH: ON AVERAGE, HOW MANY DAYS PER WEEK DO YOU ENGAGE IN MODERATE TO STRENUOUS EXERCISE (LIKE A BRISK WALK)?: 0 DAYS

## 2024-10-08 ASSESSMENT — PAIN SCALES - GENERAL: PAINLEVEL: EXTREME PAIN (8)

## 2024-10-08 ASSESSMENT — SOCIAL DETERMINANTS OF HEALTH (SDOH): HOW OFTEN DO YOU GET TOGETHER WITH FRIENDS OR RELATIVES?: PATIENT DECLINED

## 2024-10-08 NOTE — PROGRESS NOTES
ANTICOAGULATION MANAGEMENT     Arnulfo Pritchett 63 year old male is on warfarin with supratherapeutic INR result. (Goal INR 2.0-3.0)    Recent labs: (last 7 days)     10/08/24  1509   INR 4.8*       ASSESSMENT     Source(s): Chart Review and Patient/Caregiver Call     Warfarin doses taken: Warfarin taken as instructed  Diet: No new diet changes identified  Medication/supplement changes:  pt started on keflex yesterday for 10 days  New illness, injury, or hospitalization: Pt has had a sore on his big toe for a few months, has gotten worse. Podiatry has taken the nail off now. Is very painful, inflamed  Signs or symptoms of bleeding or clotting: No  Previous result: Supratherapeutic 3.1 essentia  Additional findings: None       PLAN     Recommended plan for temporary change(s) affecting INR     Dosing Instructions:  Hod today, partial hold tomorrow then continue your current warfarin dose with next INR in 3 days       Summary  As of 10/8/2024      Full warfarin instructions:  10/8: Hold; 10/9: 2.5 mg; Otherwise 5 mg every Mon, Wed, Fri; 7.5 mg all other days   Next INR check:  10/11/2024               Telephone call with Arnulfo who verbalizes understanding and agrees to plan and who agrees to plan and repeated back plan correctly    Lab visit scheduled    Education provided: Symptom monitoring: monitoring for bleeding signs and symptoms and monitoring for clotting signs and symptoms  Importance of notifying anticoagulation clinic for: changes in medications; a sooner lab recheck maybe needed  Contact [phone] with any changes, questions or concerns.     Plan made per [facility] anticoagulation protocol    Evelia Chua RN  10/8/2024  Anticoagulation Clinic  [employer] for routing messages: merlin LAURA  [facility] patient phone line: [phone]        _______________________________________________________________________     Anticoagulation Episode Summary       Current INR goal:  2.0-3.0   TTR:  --   Target end date:   Indefinite   Send INR reminders to:  [facility]    Indications    Atrial fibrillation (H) [I48.91]  H/O: stroke [Z86.73]  CVA (cerebral vascular accident) (H) [I63.9]  Long term current use of anticoagulant therapy [Z79.01]             Comments:               Anticoagulation Care Providers       Provider Role Specialty Phone number    Chai Griffin MD Referring Internal Medicine [phone]

## 2024-10-08 NOTE — ADDENDUM NOTE
Addended by: GERRI TEJADA on: 10/8/2024 05:27 PM     Modules accepted: Orders     Eloped status noted - chart and provider note reviewed.

## 2024-10-08 NOTE — PROGRESS NOTES
Preventive Care Visit  [facility]  Chai Griffin MD, Internal Medicine  Oct 8, 2024      Assessment & Plan   Problem List Items Addressed This Visit       Anemia due to chronic kidney disease, on chronic dialysis (H)    Relevant Orders    OFFICE/OUTPT VISIT,EST,LEVL IV (Completed)    Atrial fibrillation (H)    Chronic kidney disease, stage IV (severe) (H)    Relevant Orders    OFFICE/OUTPT VISIT,EST,LEVL IV (Completed)    Coronary artery disease involving native coronary artery of native heart with angina pectoris (H)    Relevant Orders    OFFICE/OUTPT VISIT,EST,LEVL IV (Completed)    ESRD on peritoneal dialysis (H)    Relevant Orders    OFFICE/OUTPT VISIT,EST,LEVL IV (Completed)    Mixed hypercholesterolemia and hypertriglyceridemia - Primary    Diabetes mellitus due to underlying condition, controlled, with chronic kidney disease on chronic dialysis, with long-term current use of insulin (H)    Relevant Orders    OFFICE/OUTPT VISIT,EST,LEVL IV (Completed)    Long term current use of anticoagulant therapy    Relevant Orders    INR point of care (finger stick)    Anticoagulation Clinic Referral    OFFICE/OUTPT VISIT,ESTLEVL IV (Completed)     Other Visit Diagnoses       Pain around toenail, left foot        Relevant Medications    acetaminophen-codeine (TYLENOL #3) 300-30 MG per tablet    Other Relevant Orders    OFFICE/OUTPT VISIT,EST,LEVL IV (Completed)    PAD (peripheral artery disease) (H)               Patient that is new to our clinic.  He moved to [place] from [place] to be closer to his daughter Eric and son in [place].  He is living in an apartment he continues to drive.  He does not smoke or drink alcohol.  He continues to see cardiology and nephrology from [place].    He does have a cane and is a falls risk.  His memory is good  He says he already had a flu shot.  And has had COVID so does not want that.    He is too sick to do a colonoscopy at this time will  consider in the future.    Chronic kidney disease stage IV follows with nephrology continues dialysis should be taking his torsemide 100 mg once a day as he still makes urine with peritoneal dialysis.  P    Peripheral arterial disease he is on Plavix, blood pressure pills.   a statin.  He had stents placed in his right leg last year.    Heart disease, on Entresto diuretics sees cardiology for A-fib and cardiomyopathy in [place].  Currently appears to be volume stable.    Toenail disease and ulceration will continue to work with Dr. Siddiqui and podiatry I will give him 10 pills of Tylenol codeine for pain control since he cannot take NSAIDs with his chronic kidney disease.  Foot is rebandaged today and looks fine he is on Keflex.      Patient has been advised of split billing requirements and indicates understanding: Yes       Counseling  Appropriate preventive services were addressed with this patient via screening, questionnaire, or discussion as appropriate for fall prevention, nutrition, physical activity, Tobacco-use cessation, social engagement, weight loss and cognition.  Checklist reviewing preventive services available has been given to the patient.  Reviewed patient's diet, addressing concerns and/or questions.   The patient was instructed to see the dentist every 6 months.   He is at risk for psychosocial distress and has been provided with information to reduce risk.   Discussed possible causes of fatigue. Updated plan of care.  Patient reported difficulty with activities of daily living were addressed today.The patient was provided with written information regarding signs of hearing loss.     FUTURE APPOINTMENTS:       - Follow-up visit in 3 months        Chanel Arredondo is a 63 year old, presenting for the following:  Establish Care and Physical        10/8/2024     2:06 PM   Additional Questions   Roomed by [facility] Care Directive  Patient does not have a Health Care Directive or Living  Will: Patient states has Advance Directive and will bring in a copy to clinic.    HPI    Needs to establish care, moved here to apartment to be closer to kids and grandkids.      Podiatry took off big toe nail yesterday and aching now on tylenol. Hard to sleep. Cephalexin for toe disease      Continues to drive, microwave meals.   , was up in [place].     Heart disease 3 stents, low EF and on entresto, coreg, torsemide, plavix.   A fib on coumadin, doesn't always get his torsemide.    PAD two stent in right femoral artery May 6th 2024.  On plavix, warfarin as well. Has  a fib as well.     Diabetes on insulin lantus 39 units A1c was 5.5 no short acting, dexcom to check.     Kidney had right kidney cancer and removed 5 years ago, left kidney is poor GFR of 5, makes urine.   Peritoneal dialysis nightly, nephrology out of [facility].     Lungs are ok, quit smoking 15 years ago.     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     No tobacco, no alcohol    Trying to get PCA for help with dialysis,         10/8/2024   General Health   How would you rate your overall physical health? (!) POOR   Feel stress (tense, anxious, or unable to sleep) Only a little      (!) STRESS CONCERN      10/8/2024   Nutrition   Diet: Diabetic    Carbohydrate counting       Multiple values from one day are sorted in reverse-chronological order         10/8/2024   Exercise   Days per week of moderate/strenous exercise 0 days      (!) EXERCISE CONCERN      10/8/2024   Social Factors   Frequency of gathering with friends or relatives Patient declined   Worry food won't last until get money to buy more No   Food not last or not have enough money for food? No   Do you have housing? (Housing is defined as stable permanent housing and does not include staying ouside in a car, in a tent, in an abandoned building, in an overnight shelter, or couch-surfing.) Yes   Are you worried about losing your  "housing? No   Lack of transportation? No   Unable to get utilities (heat,electricity)? No          10/8/2024   Fall Risk   Fallen 2 or more times in the past year? No   Trouble with walking or balance? Yes   Reason Gait Speed Test Not Completed Patient does not tolerate an upright or standing position (e.g. wheelchair)             10/8/2024   Activities of Daily Living- Home Safety   Needs help with the following daily activites Housework    Bathing    Laundry   Safety concerns in the home None of the above       Multiple values from one day are sorted in reverse-chronological order         10/8/2024   Dental   Dentist two times every year? (!) NO            10/8/2024   Hearing Screening   Hearing concerns? (!) I FEEL THAT PEOPLE ARE MUMBLING OR NOT SPEAKING CLEARLY.            10/8/2024   Driving Risk Screening   Patient/family members have concerns about driving No            10/8/2024   General Alertness/Fatigue Screening   Have you been more tired than usual lately? (!) YES            10/8/2024   Urinary Incontinence Screening   Bothered by leaking urine in past 6 months No            10/8/2024   TB Screening   Were you born outside of the [place]? No          Today's PHQ-2 Score:       8/12/2024    12:57 PM   PHQ-2 ( 1999 [employer])   Q1: Little interest or pleasure in doing things 0   Q2: Feeling down, depressed or hopeless 0   PHQ-2 Score 0         10/8/2024   Substance Use   Alcohol more than 3/day or more than 7/wk No   Do you have a current opioid prescription? No   How severe/bad is pain from 1 to 10? 8/10   Do you use any other substances recreationally? (!) DECLINE        Social History     Tobacco Use    Smoking status: Former     Types: Cigarettes    Smokeless tobacco: Never   Substance Use Topics    Alcohol use: Not Currently     Comment: quit 20 years         10/8/2024   One time HIV Screening   Previous HIV test? I don't know      Last PSA: No results found for: \"PSA\"  ASCVD Risk   The ASCVD Risk " score (Marvin SHAIKH, et al., 2019) failed to calculate for the following reasons:    The patient has a prior MI or stroke diagnosis  Reviewed and updated as needed this visit by Provider                    Lab work is in process  Labs reviewed in EPIC  Current providers sharing in care for this patient include:  Patient Care Team:  Dunphy, Tyler J as PCP - General (Internal Medicine)  Veto Beth MD as Assigned Infectious Disease Provider  Merrill Saeed DPM as Assigned Musculoskeletal Provider    The following health maintenance items are reviewed in Epic and correct as of today:  Health Maintenance   Topic Date Due    ALT  Never done    HF ACTION PLAN  Never done    LIPID  Never done    MICROALBUMIN  Never done    PARATHYROID  Never done    TSH W/FREE T4 REFLEX  Never done    DIABETIC FOOT EXAM  Never done    ANNUAL REVIEW OF HM ORDERS  Never done    ADVANCE CARE PLANNING  Never done    URINALYSIS  Never done    ALK PHOS  Never done    HIV SCREENING  Never done    MEDICARE ANNUAL WELLNESS VISIT  Never done    HEPATITIS C SCREENING  Never done    RSV VACCINE (1 - Risk 60-74 years 1-dose series) Never done    COLORECTAL CANCER SCREENING  10/25/2020    EYE EXAM  02/13/2021    HEPATITIS B IMMUNIZATION (5 of 5 - Risk Dialysis Recombivax 3-dose series) 04/05/2023    INFLUENZA VACCINE (1) 09/01/2024    COVID-19 Vaccine (4 - 2024-25 season) 09/01/2024    A1C  10/09/2024    BMP  10/11/2024    HEMOGLOBIN  01/11/2025    LUNG CANCER SCREENING  05/24/2025    CBC  07/11/2025    Pneumococcal Vaccine: Pediatrics (0 to 5 Years) and At-Risk Patients (6 to 64 Years) (3 of 3 - PPSV23 or PCV20) 05/04/2026    DTAP/TDAP/TD IMMUNIZATION (2 - Td or Tdap) 06/03/2030    PHOSPHORUS  Completed    PHQ-2 (once per calendar year)  Completed    ZOSTER IMMUNIZATION  Completed    HPV IMMUNIZATION  Aged Out    MENINGITIS IMMUNIZATION  Aged Out    RSV MONOCLONAL ANTIBODY  Aged Out         Review of Systems  CONSTITUTIONAL: NEGATIVE  "for fever, chills, change in weight  INTEGUMENTARY/SKIN: NEGATIVE for worrisome rashes, moles or lesions  EYES: NEGATIVE for vision changes or irritation  ENT/MOUTH: NEGATIVE for ear, mouth and throat problems  RESP: NEGATIVE for significant cough or SOB  BREAST: NEGATIVE for masses, tenderness or discharge  CV: NEGATIVE for chest pain, palpitations or peripheral edema  GI: NEGATIVE for nausea, abdominal pain, heartburn, or change in bowel habits  : NEGATIVE for frequency, dysuria, or hematuria  MUSCULOSKELETAL:toe pain   NEURO: NEGATIVE for weakness, dizziness or paresthesias  ENDOCRINE: NEGATIVE for temperature intolerance, skin/hair changes  HEME: NEGATIVE for bleeding problems  PSYCHIATRIC: NEGATIVE for changes in mood or affect     Objective    Exam  /62 (BP Location: Right arm, Patient Position: Sitting, Cuff Size: Adult Regular)   Pulse 103   Temp 97.9  F (36.6  C) (Temporal)   Resp 18   Ht 1.82 m (5' 11.65\")   Wt 78.7 kg (173 lb 8 oz)   SpO2 99%   BMI 23.76 kg/m     Estimated body mass index is 23.76 kg/m  as calculated from the following:    Height as of this encounter: 1.82 m (5' 11.65\").    Weight as of this encounter: 78.7 kg (173 lb 8 oz).    Physical Exam  NAD  Slight swelling in face  Heart regular but distant, weak pulses   Lungs are clear  Abd large, soft, peritoneal cath present   Ext no edema.   Left big toe has nail removed, tip is ulcerated.         10/8/2024   Mini Cog   Clock Draw Score 2 Normal   3 Item Recall 3 objects recalled   Mini Cog Total Score 5      Vision Screen  Patient wears corrective lenses (select all that apply): (!) Worn during vision screen;Wears regularly;Prescription older than 1 year  Vision Screen Results: Pass      Signed Electronically by: Chai Griffin MD    "

## 2024-10-09 ENCOUNTER — TELEPHONE (OUTPATIENT)
Dept: ANTICOAGULATION | Facility: CLINIC | Age: 64
End: 2024-10-09

## 2024-10-09 NOTE — TELEPHONE ENCOUNTER
Anticoagulation Management    Updated home monitor orders needed due to Transfer from external organization    Current home monitor company: [employer] referring provider: Chai Griffin MD    Request made by: Patient - ACC RN from [facility] called on his behalf.     Reyes Askew RN

## 2024-10-11 ENCOUNTER — ANTICOAGULATION THERAPY VISIT (OUTPATIENT)
Dept: ANTICOAGULATION | Facility: CLINIC | Age: 64
End: 2024-10-11

## 2024-10-11 ENCOUNTER — LAB (OUTPATIENT)
Dept: LAB | Facility: CLINIC | Age: 64
End: 2024-10-11
Payer: MEDICARE

## 2024-10-11 DIAGNOSIS — I63.9 CVA (CEREBRAL VASCULAR ACCIDENT) (H): ICD-10-CM

## 2024-10-11 DIAGNOSIS — I48.91 ATRIAL FIBRILLATION, UNSPECIFIED TYPE (H): ICD-10-CM

## 2024-10-11 DIAGNOSIS — I48.91 ATRIAL FIBRILLATION, UNSPECIFIED TYPE (H): Primary | ICD-10-CM

## 2024-10-11 DIAGNOSIS — Z79.01 LONG TERM CURRENT USE OF ANTICOAGULANT THERAPY: ICD-10-CM

## 2024-10-11 DIAGNOSIS — Z86.73 H/O: STROKE: ICD-10-CM

## 2024-10-11 LAB — INR BLD: 2.2 (ref 0.9–1.1)

## 2024-10-11 PROCEDURE — 85610 PROTHROMBIN TIME: CPT

## 2024-10-11 PROCEDURE — 36416 COLLJ CAPILLARY BLOOD SPEC: CPT

## 2024-10-11 NOTE — PROGRESS NOTES
ANTICOAGULATION MANAGEMENT     Arnulfo Pritchett 63 year old male is on warfarin with therapeutic INR result. (Goal INR 2.0-3.0)    Recent labs: (last 7 days)     10/11/24  1452   INR 2.2*       ASSESSMENT     Source(s): Chart Review  Previous INR was Supratherapeutic  Medication, diet, health changes since last INR: On Keflex until 10/17. Held 1.5 doses for elevated INR.          PLAN     Recommended plan for temporary change(s) affecting INR     Dosing Instructions: Continue your current warfarin dose with next INR in 5 days       Summary  As of 10/11/2024      Full warfarin instructions:  5 mg every Mon, Wed, Fri; 7.5 mg all other days   Next INR check:  10/16/2024               Detailed voice message left for Arnulfo with dosing instructions and follow up date.   Sent [employer] message with dosing and follow up instructions    Contact [phone] to schedule and with any changes, questions or concerns.     Education provided: Contact [phone] with any changes, questions or concerns.     Plan made per [facility] anticoagulation protocol    Jennifer AGUILAR RN  10/11/2024  Anticoagulation Clinic  [employer] [facility] for routing messages: merlin LAURA  [facility] patient phone line: [phone]        _______________________________________________________________________     Anticoagulation Episode Summary       Current INR goal:  2.0-3.0   TTR:  --   Target end date:  Indefinite   Send INR reminders to:  ULI LAURA    Indications    Atrial fibrillation (H) [I48.91]  H/O: stroke [Z86.73]  CVA (cerebral vascular accident) (H) [I63.9]  Long term current use of anticoagulant therapy [Z79.01]             Comments:               Anticoagulation Care Providers       Provider Role Specialty Phone number    Chai Griffin MD Referring Internal Medicine [phone]

## 2024-10-11 NOTE — PROGRESS NOTES
ANTICOAGULATION MANAGEMENT     Arnulfo AGUILAR [place] 63 year old male is on warfarin with therapeutic INR result. (Goal INR 2.0-3.0)    Recent labs: (last 7 days)     10/11/24  1452   INR 2.2*       ASSESSMENT     Source(s): Chart Review and Patient/Caregiver Call     Warfarin doses taken: Held for elevated INR  recently which may be affecting INR  Diet: No new diet changes identified  Medication/supplement changes:  Keflex started on 10/7 which may be increasing INR today  New illness, injury, or hospitalization: No  Signs or symptoms of bleeding or clotting: No  Previous result: Supratherapeutic  Additional findings: None       PLAN     Recommended plan for temporary change(s) affecting INR     Dosing Instructions: Continue your current warfarin dose with next INR in 5 days       Summary  As of 10/11/2024      Full warfarin instructions:  5 mg every Mon, Wed, Fri; 7.5 mg all other days   Next INR check:  10/16/2024               Telephone call with Arnulfo who verbalizes understanding and agrees to plan    Lab visit scheduled    Education provided: Contact [phone] with any changes, questions or concerns.     Plan made per [facility] anticoagulation protocol    Jennifer AGUILAR RN  10/11/2024  Anticoagulation Clinic  [employer] for routing messages: merlin LAURA  [facility] patient phone line: [phone]        _______________________________________________________________________     Anticoagulation Episode Summary       Current INR goal:  2.0-3.0   TTR:  --   Target end date:  Indefinite   Send INR reminders to:  ULI LAURA    Indications    Atrial fibrillation (H) [I48.91]  H/O: stroke [Z86.73]  CVA (cerebral vascular accident) (H) [I63.9]  Long term current use of anticoagulant therapy [Z79.01]             Comments:               Anticoagulation Care Providers       Provider Role Specialty Phone number    Chai Griffin MD Referring Internal Medicine [phone]

## 2024-10-15 ENCOUNTER — HOSPITAL ENCOUNTER (INPATIENT)
Facility: CLINIC | Age: 64
LOS: 25 days | Discharge: NURSING, REHAB OR LONG-TERM CARE | DRG: 252 | End: 2024-11-09
Attending: HOSPITALIST | Admitting: INTERNAL MEDICINE
Payer: MEDICARE

## 2024-10-15 ENCOUNTER — HOSPITAL ENCOUNTER (EMERGENCY)
Facility: CLINIC | Age: 64
Discharge: ANOTHER HOSPITAL | DRG: 252 | End: 2024-10-15
Attending: EMERGENCY MEDICINE | Admitting: EMERGENCY MEDICINE
Payer: MEDICARE

## 2024-10-15 ENCOUNTER — DOCUMENTATION ONLY (OUTPATIENT)
Dept: ANTICOAGULATION | Facility: CLINIC | Age: 64
End: 2024-10-15

## 2024-10-15 VITALS
HEART RATE: 91 BPM | WEIGHT: 173.28 LBS | RESPIRATION RATE: 20 BRPM | DIASTOLIC BLOOD PRESSURE: 93 MMHG | BODY MASS INDEX: 23.73 KG/M2 | TEMPERATURE: 98 F | OXYGEN SATURATION: 97 % | SYSTOLIC BLOOD PRESSURE: 103 MMHG

## 2024-10-15 DIAGNOSIS — R80.9 NEPHROTIC RANGE PROTEINURIA: ICD-10-CM

## 2024-10-15 DIAGNOSIS — L08.9 LOCAL SKIN INFECTION: ICD-10-CM

## 2024-10-15 DIAGNOSIS — T78.2XXA ANAPHYLACTIC SHOCK, UNSPECIFIED, INITIAL ENCOUNTER: ICD-10-CM

## 2024-10-15 DIAGNOSIS — I48.20 HYPERCOAGULABLE STATE DUE TO CHRONIC ATRIAL FIBRILLATION (H): ICD-10-CM

## 2024-10-15 DIAGNOSIS — N18.6 ESRD ON PERITONEAL DIALYSIS (H): ICD-10-CM

## 2024-10-15 DIAGNOSIS — D63.1 ANEMIA DUE TO CHRONIC KIDNEY DISEASE, ON CHRONIC DIALYSIS (H): ICD-10-CM

## 2024-10-15 DIAGNOSIS — T50.905A HYPERGLYCEMIA, DRUG-INDUCED: ICD-10-CM

## 2024-10-15 DIAGNOSIS — E87.5 HYPERKALEMIA: ICD-10-CM

## 2024-10-15 DIAGNOSIS — R52 PAIN, UNSPECIFIED: ICD-10-CM

## 2024-10-15 DIAGNOSIS — I99.8 ISCHEMIC LEG: ICD-10-CM

## 2024-10-15 DIAGNOSIS — E78.2 MIXED HYPERCHOLESTEROLEMIA AND HYPERTRIGLYCERIDEMIA: ICD-10-CM

## 2024-10-15 DIAGNOSIS — G47.33 OSA ON CPAP: ICD-10-CM

## 2024-10-15 DIAGNOSIS — N18.6 ANEMIA DUE TO CHRONIC KIDNEY DISEASE, ON CHRONIC DIALYSIS (H): ICD-10-CM

## 2024-10-15 DIAGNOSIS — L08.9 FOOT INFECTION: ICD-10-CM

## 2024-10-15 DIAGNOSIS — E11.9 DIABETES MELLITUS TYPE 2, INSULIN DEPENDENT (H): ICD-10-CM

## 2024-10-15 DIAGNOSIS — M79.675 PAIN OF TOE OF LEFT FOOT: ICD-10-CM

## 2024-10-15 DIAGNOSIS — I50.22 CHRONIC SYSTOLIC HEART FAILURE (H): ICD-10-CM

## 2024-10-15 DIAGNOSIS — R73.9 HYPERGLYCEMIA, DRUG-INDUCED: ICD-10-CM

## 2024-10-15 DIAGNOSIS — Z99.2 DEPENDENCE ON RENAL DIALYSIS (H): ICD-10-CM

## 2024-10-15 DIAGNOSIS — Z86.73 H/O: STROKE: ICD-10-CM

## 2024-10-15 DIAGNOSIS — I87.8 VENOUS STASIS: ICD-10-CM

## 2024-10-15 DIAGNOSIS — D68.69 HYPERCOAGULABLE STATE DUE TO CHRONIC ATRIAL FIBRILLATION (H): ICD-10-CM

## 2024-10-15 DIAGNOSIS — I25.5 ISCHEMIC CARDIOMYOPATHY: ICD-10-CM

## 2024-10-15 DIAGNOSIS — Z79.01 LONG TERM CURRENT USE OF ANTICOAGULANT THERAPY: ICD-10-CM

## 2024-10-15 DIAGNOSIS — R79.89 OTHER SPECIFIED ABNORMAL FINDINGS OF BLOOD CHEMISTRY: ICD-10-CM

## 2024-10-15 DIAGNOSIS — R04.2 HEMOPTYSIS: ICD-10-CM

## 2024-10-15 DIAGNOSIS — E11.621 TYPE 2 DIABETES MELLITUS WITH LEFT DIABETIC FOOT ULCER (H): ICD-10-CM

## 2024-10-15 DIAGNOSIS — E83.39 OTHER DISORDERS OF PHOSPHORUS METABOLISM: ICD-10-CM

## 2024-10-15 DIAGNOSIS — Z99.2 ESRD ON PERITONEAL DIALYSIS (H): ICD-10-CM

## 2024-10-15 DIAGNOSIS — E78.5 DYSLIPIDEMIA: ICD-10-CM

## 2024-10-15 DIAGNOSIS — I73.9 PERIPHERAL ARTERIAL DISEASE (H): ICD-10-CM

## 2024-10-15 DIAGNOSIS — E11.22 TYPE 2 DM WITH HYPERTENSION AND ESRD ON DIALYSIS (H): ICD-10-CM

## 2024-10-15 DIAGNOSIS — I16.0 HYPERTENSIVE URGENCY: ICD-10-CM

## 2024-10-15 DIAGNOSIS — Z99.2: ICD-10-CM

## 2024-10-15 DIAGNOSIS — Z76.82 KIDNEY TRANSPLANT CANDIDATE: ICD-10-CM

## 2024-10-15 DIAGNOSIS — Z99.2 PERITONEAL DIALYSIS CATHETER IN PLACE (H): ICD-10-CM

## 2024-10-15 DIAGNOSIS — Z99.2 TYPE 2 DM WITH HYPERTENSION AND ESRD ON DIALYSIS (H): ICD-10-CM

## 2024-10-15 DIAGNOSIS — L97.529 ULCER OF GREAT TOE, LEFT, WITH UNSPECIFIED SEVERITY (H): ICD-10-CM

## 2024-10-15 DIAGNOSIS — Z79.4 DIABETES MELLITUS TYPE 2, INSULIN DEPENDENT (H): ICD-10-CM

## 2024-10-15 DIAGNOSIS — N18.4 CHRONIC KIDNEY DISEASE, STAGE IV (SEVERE) (H): ICD-10-CM

## 2024-10-15 DIAGNOSIS — N25.81 SECONDARY HYPERPARATHYROIDISM OF RENAL ORIGIN (H): ICD-10-CM

## 2024-10-15 DIAGNOSIS — Z79.4: ICD-10-CM

## 2024-10-15 DIAGNOSIS — E61.1 IRON DEFICIENCY: ICD-10-CM

## 2024-10-15 DIAGNOSIS — H53.462 LEFT HOMONYMOUS HEMIANOPSIA: ICD-10-CM

## 2024-10-15 DIAGNOSIS — J15.211: ICD-10-CM

## 2024-10-15 DIAGNOSIS — I50.22 CHRONIC SYSTOLIC CONGESTIVE HEART FAILURE (H): ICD-10-CM

## 2024-10-15 DIAGNOSIS — Z85.528 PERSONAL HISTORY OF OTHER MALIGNANT NEOPLASM OF KIDNEY: ICD-10-CM

## 2024-10-15 DIAGNOSIS — L08.9 TOE INFECTION: ICD-10-CM

## 2024-10-15 DIAGNOSIS — N28.89 RENAL MASS: ICD-10-CM

## 2024-10-15 DIAGNOSIS — I10 ESSENTIAL (PRIMARY) HYPERTENSION: ICD-10-CM

## 2024-10-15 DIAGNOSIS — N18.6: ICD-10-CM

## 2024-10-15 DIAGNOSIS — L97.529: Primary | ICD-10-CM

## 2024-10-15 DIAGNOSIS — I25.119 CORONARY ARTERY DISEASE INVOLVING NATIVE CORONARY ARTERY OF NATIVE HEART WITH ANGINA PECTORIS (H): ICD-10-CM

## 2024-10-15 DIAGNOSIS — Z79.4 TYPE 2 DIABETES MELLITUS WITH CHRONIC KIDNEY DISEASE, WITH LONG-TERM CURRENT USE OF INSULIN, UNSPECIFIED CKD STAGE (H): ICD-10-CM

## 2024-10-15 DIAGNOSIS — I63.9 ISCHEMIC STROKE (H): ICD-10-CM

## 2024-10-15 DIAGNOSIS — J90 PLEURAL EFFUSION, NOT ELSEWHERE CLASSIFIED: ICD-10-CM

## 2024-10-15 DIAGNOSIS — D68.9 COAGULATION DEFECT (H): ICD-10-CM

## 2024-10-15 DIAGNOSIS — I21.11 ST ELEVATION MYOCARDIAL INFARCTION INVOLVING RIGHT CORONARY ARTERY (H): ICD-10-CM

## 2024-10-15 DIAGNOSIS — I70.222 CRITICAL LIMB ISCHEMIA OF LEFT LOWER EXTREMITY (H): ICD-10-CM

## 2024-10-15 DIAGNOSIS — E08.22: ICD-10-CM

## 2024-10-15 DIAGNOSIS — I12.0 TYPE 2 DM WITH HYPERTENSION AND ESRD ON DIALYSIS (H): ICD-10-CM

## 2024-10-15 DIAGNOSIS — M77.8 RIGHT SHOULDER TENDINITIS: ICD-10-CM

## 2024-10-15 DIAGNOSIS — N18.6 TYPE 2 DM WITH HYPERTENSION AND ESRD ON DIALYSIS (H): ICD-10-CM

## 2024-10-15 DIAGNOSIS — Z99.2 ANEMIA DUE TO CHRONIC KIDNEY DISEASE, ON CHRONIC DIALYSIS (H): ICD-10-CM

## 2024-10-15 DIAGNOSIS — Z99.2 PERITONEAL DIALYSIS CATHETER IN SITU (H): ICD-10-CM

## 2024-10-15 DIAGNOSIS — E11.22 TYPE 2 DIABETES MELLITUS WITH CHRONIC KIDNEY DISEASE, WITH LONG-TERM CURRENT USE OF INSULIN, UNSPECIFIED CKD STAGE (H): ICD-10-CM

## 2024-10-15 DIAGNOSIS — M1A.39X0 CHRONIC GOUT DUE TO RENAL IMPAIRMENT OF MULTIPLE SITES WITHOUT TOPHUS: ICD-10-CM

## 2024-10-15 DIAGNOSIS — L97.529 TYPE 2 DIABETES MELLITUS WITH LEFT DIABETIC FOOT ULCER (H): ICD-10-CM

## 2024-10-15 LAB
BASOPHILS # BLD AUTO: 0 10E3/UL (ref 0–0.2)
BASOPHILS NFR BLD AUTO: 1 %
CREAT SERPL-MCNC: 9.84 MG/DL (ref 0.67–1.17)
CRP SERPL-MCNC: 55.81 MG/L
EGFRCR SERPLBLD CKD-EPI 2021: 5 ML/MIN/1.73M2
EOSINOPHIL # BLD AUTO: 0.6 10E3/UL (ref 0–0.7)
EOSINOPHIL NFR BLD AUTO: 8 %
ERYTHROCYTE [DISTWIDTH] IN BLOOD BY AUTOMATED COUNT: 14.6 % (ref 10–15)
ERYTHROCYTE [SEDIMENTATION RATE] IN BLOOD BY WESTERGREN METHOD: 86 MM/HR (ref 0–20)
GLUCOSE BLDC GLUCOMTR-MCNC: 132 MG/DL (ref 70–99)
GLUCOSE BLDC GLUCOMTR-MCNC: 171 MG/DL (ref 70–99)
HCT VFR BLD AUTO: 35.8 % (ref 40–53)
HGB BLD-MCNC: 12.5 G/DL (ref 13.3–17.7)
IMM GRANULOCYTES # BLD: 0 10E3/UL
IMM GRANULOCYTES NFR BLD: 0 %
INR PPP: 4.42 (ref 0.85–1.15)
LYMPHOCYTES # BLD AUTO: 0.9 10E3/UL (ref 0.8–5.3)
LYMPHOCYTES NFR BLD AUTO: 12 %
MCH RBC QN AUTO: 34.4 PG (ref 26.5–33)
MCHC RBC AUTO-ENTMCNC: 34.9 G/DL (ref 31.5–36.5)
MCV RBC AUTO: 99 FL (ref 78–100)
MONOCYTES # BLD AUTO: 0.6 10E3/UL (ref 0–1.3)
MONOCYTES NFR BLD AUTO: 8 %
NEUTROPHILS # BLD AUTO: 5.3 10E3/UL (ref 1.6–8.3)
NEUTROPHILS NFR BLD AUTO: 71 %
NRBC # BLD AUTO: 0 10E3/UL
NRBC BLD AUTO-RTO: 0 /100
PLATELET # BLD AUTO: 299 10E3/UL (ref 150–450)
RBC # BLD AUTO: 3.63 10E6/UL (ref 4.4–5.9)
WBC # BLD AUTO: 7.5 10E3/UL (ref 4–11)

## 2024-10-15 PROCEDURE — 250N000011 HC RX IP 250 OP 636: Performed by: INTERNAL MEDICINE

## 2024-10-15 PROCEDURE — 96366 THER/PROPH/DIAG IV INF ADDON: CPT

## 2024-10-15 PROCEDURE — 250N000011 HC RX IP 250 OP 636: Performed by: EMERGENCY MEDICINE

## 2024-10-15 PROCEDURE — 99285 EMERGENCY DEPT VISIT HI MDM: CPT | Mod: 25

## 2024-10-15 PROCEDURE — 85610 PROTHROMBIN TIME: CPT | Performed by: INTERNAL MEDICINE

## 2024-10-15 PROCEDURE — 85025 COMPLETE CBC W/AUTO DIFF WBC: CPT | Performed by: EMERGENCY MEDICINE

## 2024-10-15 PROCEDURE — 258N000003 HC RX IP 258 OP 636: Performed by: EMERGENCY MEDICINE

## 2024-10-15 PROCEDURE — 85652 RBC SED RATE AUTOMATED: CPT | Performed by: EMERGENCY MEDICINE

## 2024-10-15 PROCEDURE — 85048 AUTOMATED LEUKOCYTE COUNT: CPT | Performed by: EMERGENCY MEDICINE

## 2024-10-15 PROCEDURE — 96365 THER/PROPH/DIAG IV INF INIT: CPT

## 2024-10-15 PROCEDURE — 36415 COLL VENOUS BLD VENIPUNCTURE: CPT | Performed by: INTERNAL MEDICINE

## 2024-10-15 PROCEDURE — 120N000001 HC R&B MED SURG/OB

## 2024-10-15 PROCEDURE — 250N000013 HC RX MED GY IP 250 OP 250 PS 637: Performed by: INTERNAL MEDICINE

## 2024-10-15 PROCEDURE — 82565 ASSAY OF CREATININE: CPT | Performed by: INTERNAL MEDICINE

## 2024-10-15 PROCEDURE — 36415 COLL VENOUS BLD VENIPUNCTURE: CPT | Performed by: EMERGENCY MEDICINE

## 2024-10-15 PROCEDURE — 86140 C-REACTIVE PROTEIN: CPT | Performed by: EMERGENCY MEDICINE

## 2024-10-15 PROCEDURE — 99285 EMERGENCY DEPT VISIT HI MDM: CPT | Performed by: EMERGENCY MEDICINE

## 2024-10-15 PROCEDURE — 96367 TX/PROPH/DG ADDL SEQ IV INF: CPT

## 2024-10-15 PROCEDURE — 82962 GLUCOSE BLOOD TEST: CPT

## 2024-10-15 RX ORDER — CALCIUM CARBONATE 500 MG/1
1000 TABLET, CHEWABLE ORAL 4 TIMES DAILY PRN
Status: DISCONTINUED | OUTPATIENT
Start: 2024-10-15 | End: 2024-11-09 | Stop reason: HOSPADM

## 2024-10-15 RX ORDER — NICOTINE POLACRILEX 4 MG
15-30 LOZENGE BUCCAL
Status: DISCONTINUED | OUTPATIENT
Start: 2024-10-15 | End: 2024-11-09 | Stop reason: HOSPADM

## 2024-10-15 RX ORDER — LIDOCAINE 40 MG/G
CREAM TOPICAL
Status: DISCONTINUED | OUTPATIENT
Start: 2024-10-15 | End: 2024-10-27

## 2024-10-15 RX ORDER — NALOXONE HYDROCHLORIDE 0.4 MG/ML
0.2 INJECTION, SOLUTION INTRAMUSCULAR; INTRAVENOUS; SUBCUTANEOUS
Status: DISCONTINUED | OUTPATIENT
Start: 2024-10-15 | End: 2024-11-09 | Stop reason: HOSPADM

## 2024-10-15 RX ORDER — PIPERACILLIN SODIUM, TAZOBACTAM SODIUM 2; .25 G/10ML; G/10ML
2.25 INJECTION, POWDER, LYOPHILIZED, FOR SOLUTION INTRAVENOUS ONCE
Status: COMPLETED | OUTPATIENT
Start: 2024-10-15 | End: 2024-10-15

## 2024-10-15 RX ORDER — DEXTROSE MONOHYDRATE 25 G/50ML
25-50 INJECTION, SOLUTION INTRAVENOUS
Status: DISCONTINUED | OUTPATIENT
Start: 2024-10-15 | End: 2024-11-09 | Stop reason: HOSPADM

## 2024-10-15 RX ORDER — NALOXONE HYDROCHLORIDE 0.4 MG/ML
0.4 INJECTION, SOLUTION INTRAMUSCULAR; INTRAVENOUS; SUBCUTANEOUS
Status: DISCONTINUED | OUTPATIENT
Start: 2024-10-15 | End: 2024-11-09 | Stop reason: HOSPADM

## 2024-10-15 RX ORDER — ATORVASTATIN CALCIUM 40 MG/1
40 TABLET, FILM COATED ORAL AT BEDTIME
Status: DISCONTINUED | OUTPATIENT
Start: 2024-10-15 | End: 2024-11-09 | Stop reason: HOSPADM

## 2024-10-15 RX ORDER — ACETAMINOPHEN 500 MG
1000 TABLET ORAL 2 TIMES DAILY
Status: DISCONTINUED | OUTPATIENT
Start: 2024-10-15 | End: 2024-10-25

## 2024-10-15 RX ORDER — TORSEMIDE 100 MG/1
100 TABLET ORAL EVERY MORNING
Status: DISCONTINUED | OUTPATIENT
Start: 2024-10-16 | End: 2024-10-20

## 2024-10-15 RX ORDER — CLOPIDOGREL BISULFATE 75 MG/1
75 TABLET ORAL EVERY EVENING
COMMUNITY

## 2024-10-15 RX ORDER — PANTOPRAZOLE SODIUM 40 MG/1
40 TABLET, DELAYED RELEASE ORAL 2 TIMES DAILY
Status: DISCONTINUED | OUTPATIENT
Start: 2024-10-15 | End: 2024-11-09 | Stop reason: HOSPADM

## 2024-10-15 RX ORDER — ACETAMINOPHEN AND CODEINE PHOSPHATE 300; 30 MG/1; MG/1
1 TABLET ORAL EVERY 6 HOURS PRN
COMMUNITY

## 2024-10-15 RX ORDER — PIPERACILLIN SODIUM, TAZOBACTAM SODIUM 2; .25 G/10ML; G/10ML
2.25 INJECTION, POWDER, LYOPHILIZED, FOR SOLUTION INTRAVENOUS EVERY 6 HOURS
Status: DISCONTINUED | OUTPATIENT
Start: 2024-10-15 | End: 2024-10-15

## 2024-10-15 RX ORDER — ONDANSETRON 4 MG/1
4 TABLET, ORALLY DISINTEGRATING ORAL EVERY 6 HOURS PRN
Status: DISCONTINUED | OUTPATIENT
Start: 2024-10-15 | End: 2024-10-26

## 2024-10-15 RX ORDER — CARVEDILOL 12.5 MG/1
12.5 TABLET ORAL AT BEDTIME
Status: DISCONTINUED | OUTPATIENT
Start: 2024-10-15 | End: 2024-10-16

## 2024-10-15 RX ORDER — PIPERACILLIN SODIUM, TAZOBACTAM SODIUM 2; .25 G/10ML; G/10ML
2.25 INJECTION, POWDER, LYOPHILIZED, FOR SOLUTION INTRAVENOUS EVERY 8 HOURS
Status: DISCONTINUED | OUTPATIENT
Start: 2024-10-16 | End: 2024-10-22

## 2024-10-15 RX ORDER — CALCITRIOL 0.25 UG/1
0.25 CAPSULE, LIQUID FILLED ORAL AT BEDTIME
Status: DISCONTINUED | OUTPATIENT
Start: 2024-10-15 | End: 2024-11-09 | Stop reason: HOSPADM

## 2024-10-15 RX ORDER — AMOXICILLIN 250 MG
2 CAPSULE ORAL 2 TIMES DAILY PRN
Status: DISCONTINUED | OUTPATIENT
Start: 2024-10-15 | End: 2024-11-09 | Stop reason: HOSPADM

## 2024-10-15 RX ORDER — ASCORBIC ACID, THIAMINE, RIBOFLAVIN, NIACINAMIDE, PYRIDOXINE, FOLIC ACID, COBALAMIN, BIOTIN, PANTOTHENIC ACID 100; 1.5; 1.7; 20; 10; 1; 6; 300; 1 MG/1; MG/1; MG/1; MG/1; MG/1; MG/1; UG/1; UG/1; MG/1
1 TABLET, COATED ORAL DAILY
COMMUNITY
Start: 2024-07-22

## 2024-10-15 RX ORDER — SEVELAMER CARBONATE 800 MG/1
800 TABLET, FILM COATED ORAL
Status: DISCONTINUED | OUTPATIENT
Start: 2024-10-16 | End: 2024-11-09 | Stop reason: HOSPADM

## 2024-10-15 RX ORDER — ALLOPURINOL 100 MG/1
200 TABLET ORAL EVERY EVENING
Status: DISCONTINUED | OUTPATIENT
Start: 2024-10-15 | End: 2024-11-09 | Stop reason: HOSPADM

## 2024-10-15 RX ORDER — AMOXICILLIN 250 MG
1 CAPSULE ORAL 2 TIMES DAILY PRN
Status: DISCONTINUED | OUTPATIENT
Start: 2024-10-15 | End: 2024-11-09 | Stop reason: HOSPADM

## 2024-10-15 RX ORDER — ACETAMINOPHEN AND CODEINE PHOSPHATE 300; 30 MG/1; MG/1
1 TABLET ORAL EVERY 6 HOURS PRN
Status: DISCONTINUED | OUTPATIENT
Start: 2024-10-15 | End: 2024-10-19

## 2024-10-15 RX ORDER — WARFARIN SODIUM 5 MG/1
7.5 TABLET ORAL
Status: ON HOLD | COMMUNITY
End: 2024-10-15

## 2024-10-15 RX ORDER — ONDANSETRON 2 MG/ML
4 INJECTION INTRAMUSCULAR; INTRAVENOUS EVERY 6 HOURS PRN
Status: DISCONTINUED | OUTPATIENT
Start: 2024-10-15 | End: 2024-10-26

## 2024-10-15 RX ORDER — CINACALCET 30 MG/1
60 TABLET, FILM COATED ORAL
Status: DISCONTINUED | OUTPATIENT
Start: 2024-10-16 | End: 2024-11-09 | Stop reason: HOSPADM

## 2024-10-15 RX ADMIN — ACETAMINOPHEN 1000 MG: 500 TABLET, FILM COATED ORAL at 20:25

## 2024-10-15 RX ADMIN — CARVEDILOL 12.5 MG: 12.5 TABLET, FILM COATED ORAL at 20:26

## 2024-10-15 RX ADMIN — ONDANSETRON 4 MG: 2 INJECTION INTRAMUSCULAR; INTRAVENOUS at 21:19

## 2024-10-15 RX ADMIN — VANCOMYCIN HYDROCHLORIDE 1500 MG: 1 INJECTION, POWDER, LYOPHILIZED, FOR SOLUTION INTRAVENOUS at 14:25

## 2024-10-15 RX ADMIN — SACUBITRIL AND VALSARTAN 1 TABLET: 49; 51 TABLET, FILM COATED ORAL at 20:25

## 2024-10-15 RX ADMIN — ATORVASTATIN CALCIUM 40 MG: 40 TABLET, FILM COATED ORAL at 20:25

## 2024-10-15 RX ADMIN — PANTOPRAZOLE SODIUM 40 MG: 40 TABLET, DELAYED RELEASE ORAL at 20:25

## 2024-10-15 RX ADMIN — CALCITRIOL CAPSULES 0.25 MCG 0.25 MCG: 0.25 CAPSULE ORAL at 20:25

## 2024-10-15 RX ADMIN — ALLOPURINOL 200 MG: 100 TABLET ORAL at 20:26

## 2024-10-15 RX ADMIN — PIPERACILLIN AND TAZOBACTAM 2.25 G: 2; .25 INJECTION, POWDER, FOR SOLUTION INTRAVENOUS at 13:49

## 2024-10-15 RX ADMIN — PIPERACILLIN AND TAZOBACTAM 2.25 G: 2; .25 INJECTION, POWDER, FOR SOLUTION INTRAVENOUS at 21:18

## 2024-10-15 ASSESSMENT — ACTIVITIES OF DAILY LIVING (ADL)
ADLS_ACUITY_SCORE: 34
ADLS_ACUITY_SCORE: 31
ADLS_ACUITY_SCORE: 36
ADLS_ACUITY_SCORE: 38
ADLS_ACUITY_SCORE: 36
ADLS_ACUITY_SCORE: 36
ADLS_ACUITY_SCORE: 31
ADLS_ACUITY_SCORE: 38
ADLS_ACUITY_SCORE: 36
ADLS_ACUITY_SCORE: 36

## 2024-10-15 ASSESSMENT — COLUMBIA-SUICIDE SEVERITY RATING SCALE - C-SSRS
6. HAVE YOU EVER DONE ANYTHING, STARTED TO DO ANYTHING, OR PREPARED TO DO ANYTHING TO END YOUR LIFE?: NO
1. IN THE PAST MONTH, HAVE YOU WISHED YOU WERE DEAD OR WISHED YOU COULD GO TO SLEEP AND NOT WAKE UP?: NO
2. HAVE YOU ACTUALLY HAD ANY THOUGHTS OF KILLING YOURSELF IN THE PAST MONTH?: NO

## 2024-10-15 NOTE — ED TRIAGE NOTES
Patient c/o red streak up left leg and pain after having toenail removed last week.     Triage Assessment (Adult)       Row Name 10/15/24 1212          Triage Assessment    Airway WDL WDL        Respiratory WDL    Respiratory WDL WDL        Skin Circulation/Temperature WDL    Skin Circulation/Temperature WDL X   red streak up left shin

## 2024-10-15 NOTE — CONSULTS
"Pharmacy Vancomycin Initial Note  Date of Service October 15, 2024  Patient's  1960  63 year old, male    Indication: Skin and Soft Tissue Infection    Current estimated CrCl = Estimated Creatinine Clearance: 11 mL/min (A) (based on SCr of 7.51 mg/dL (H)).    Creatinine for last 3 days  No results found for requested labs within last 3 days.    Recent Vancomycin Level(s) for last 3 days  No results found for requested labs within last 3 days.      Vancomycin IV Administrations (past 72 hours)        No vancomycin orders with administrations in past 72 hours.                    Nephrotoxins and other renal medications (From now, onward)      Start     Dose/Rate Route Frequency Ordered Stop    10/15/24 1350  vancomycin (VANCOCIN) 1,500 mg in sodium chloride 0.9 % 290 mL intermittent infusion         20 mg/kg × 78.6 kg  over 90 Minutes Intravenous ONCE 10/15/24 1346      10/15/24 1346  vancomycin place jin - receiving intermittent dosing         1 each Intravenous SEE ADMIN INSTRUCTIONS 10/15/24 1346      10/15/24 1335  piperacillin-tazobactam (ZOSYN) 2.25 g vial to attach to  ml bag        Note to Pharmacy: For [facility], [facility] and [facility]: For Zosyn-naive patients, use the \"Zosyn initial dose + extended infusion\" order panel.    2.25 g  over 30 Minutes Intravenous ONCE 10/15/24 1333              Contrast Orders - past 72 hours (72h ago, onward)      None            Plan:  Start vancomycin  1500 mg IV x1 dose.   Vancomycin monitoring method: Trough (Method 2 = manual dose calculation)  Vancomycin therapeutic monitoring goal: 10-15 mg/L  Pharmacy will check vancomycin levels as appropriate in 1-3 Days.    Serum creatinine levels will be ordered daily for the first week of therapy and at least twice weekly for subsequent weeks.      Leonardo Arredondo [facility]    "

## 2024-10-15 NOTE — ED PROVIDER NOTES
History     Chief Complaint   Patient presents with    Wound Infection     HPI  History per patient, medical records    This is a 63-year-old male, history of end-stage renal disease on peritoneal dialysis nightly, type 2 diabetes, renal cell carcinoma status post right nephrectomy, atrial fibrillation, coronary artery disease and cardiomyopathy, hypertension, hyperlipidemia, gout, peripheral artery disease presenting with wound infection.  Patient was hospitalized in July 2024 for concerns of a diabetic left great toe infection.  He was seen by podiatry, infectious disease, managed on IV Vanco and Zosyn and discharged on Augmentin.  He has been managed by podiatry and most recently was seen on 10/7/2024 by Dr. Siddiqui.  Please see office note below.    Patient notes that over the last 1 to 2 days he has had increasing pain, throbbing, redness and now has a red streak going up his left foot and leg extending from the left toe.  He has not been soaking the toe as instructed.  He has just been covering it lately with a Band-Aid.  He has had a little bit of drainage on the Band-Aid.  No fevers.  He has been managing his pain with intermittent Tylenol with codeine.  He acknowledges peripheral neuropathy but notes that the foot and toe have been throbbing, especially when he tries to have his feet down.  He has mostly been sitting with them elevated is much as possible.  No other constitutional symptoms including nausea, vomiting, chest pain, shortness of breath, cold or cough symptoms.  He does nightly peritoneal dialysis.  He still makes some urine on torsemide.    Of note, patient had an acute right lower extremity arterial occlusion and underwent SFA popliteal balloon angioplasty and stenting emergently on 5/6/2024.  He has been managed postprocedure on Plavix and Coumadin.  Yesterday INR was 2.2.  Patient has a Dexcom and blood sugars have been running in the 130s.      Podiatry note 10/7/2024:  Interpreted VLADIMIR and  arterial ultrasound dated July and August 2024  He does have arterial disease but appears to be adequate for healing.     His entire left hallux nail plate is poorly attached with a subungual hematoma.  Any unattached portion of the nail was debrided today with a nail nipper and a sterile dressing was applied.  Recommend Betadine once daily regular bathing but no soaking or submersion.  Once is dry and no longer staining that a regular sock.  Follow-up in 10-14 days.  Begin oral antibiotic Keflex renal based dosing and this was discussed with the pharmacist for renal dosing.    Patient does require peritoneal dialysis at home daily.  Patient understands this may or may not heal due to his arterial insufficiency and renal insufficiency.  Interpreted radiographs today demonstrating no obvious signs of cortical disruption or loss of trabeculation.  No obvious osteomyelitis.  We did however discuss the importance of following this and the risk of developing osteomyelitis as this great toe wound has been a problem for him now for months.  Dispensed a postoperative shoe for the left foot.    Allergies:  No Known Allergies    Problem List:    Patient Active Problem List    Diagnosis Date Noted    Foot infection 10/15/2024     Priority: Medium    Long term current use of anticoagulant therapy 10/08/2024     Priority: Medium    Sleep apnea 08/23/2024     Priority: Medium     Formatting of this note might be different from the original.   not on cpap      Dependence on renal dialysis (H) 07/10/2024     Priority: Medium    Type 2 diabetes mellitus with left diabetic foot ulcer (H) 07/10/2024     Priority: Medium    Hyperkalemia 05/06/2024     Priority: Medium    Ischemic leg 05/06/2024     Priority: Medium    Right shoulder tendinitis 12/08/2023     Priority: Medium    Iron deficiency anemia, unspecified 06/29/2023     Priority: Medium    Hypercoagulable state due to chronic atrial fibrillation (H) 04/27/2023     Priority:  Medium    H/O: stroke 04/10/2023     Priority: Medium    Anaphylactic shock, unspecified, initial encounter 03/31/2023     Priority: Medium    Staphylococcus aureus pneumonia (H) 01/12/2023     Priority: Medium    Hemoptysis 01/01/2023     Priority: Medium    Other disorders of phosphorus metabolism 11/22/2022     Priority: Medium    Ischemic cardiomyopathy 11/07/2022     Priority: Medium    Pleural effusion, not elsewhere classified 10/18/2022     Priority: Medium    Anemia in chronic kidney disease 08/30/2022     Priority: Medium    Personal history of other malignant neoplasm of kidney 08/30/2022     Priority: Medium    Secondary hyperparathyroidism of renal origin (H) 08/30/2022     Priority: Medium    Coagulation defect (H) 07/20/2022     Priority: Medium    Pain, unspecified 07/20/2022     Priority: Medium    Type 2 diabetes mellitus with chronic kidney disease (H) 07/20/2022     Priority: Medium    Iron deficiency 03/31/2022     Priority: Medium    Diabetes mellitus due to underlying condition, controlled, with chronic kidney disease on chronic dialysis, with long-term current use of insulin (H) 12/07/2021     Priority: Medium    Venous stasis 09/27/2021     Priority: Medium    Renal mass 09/02/2021     Priority: Medium    ED (erectile dysfunction) 06/23/2021     Priority: Medium    Peritoneal dialysis catheter in situ (H) 06/23/2021     Priority: Medium    Atrial fibrillation (H) 05/25/2021     Priority: Medium     Formatting of this note might be different from the original.   7/12/2021 Holter Monitor: 100% afib with rates varying from 66 to 162 beats per minute      Kidney transplant candidate 05/21/2021     Priority: Medium     Formatting of this note might be different from the original.   Awaiting right nx after 7 cm right renal mass found for tx eval      Anemia due to chronic kidney disease, on chronic dialysis (H) 04/23/2021     Priority: Medium    ESRD on peritoneal dialysis (H) 04/23/2021      Priority: Medium    Chronic gout due to renal impairment of multiple sites without tophus 12/09/2019     Priority: Medium    TALI on CPAP 12/09/2019     Priority: Medium    Hyperglycemia, drug-induced 09/27/2019     Priority: Medium    Nephrotic range proteinuria 03/05/2019     Priority: Medium    Cardiomyopathy (H) 02/09/2018     Priority: Medium     Formatting of this note might be different from the original.   8/17/2021 Echo EF 20-25%   7/19/18- per echo: ejection fraction is 50-55%.   12/4/2017- per echo:  ejection fraction is 40-45%.      Essential (primary) hypertension 12/27/2017     Priority: Medium    CVA (cerebral vascular accident) (H) 12/27/2017     Priority: Medium     Formatting of this note might be different from the original.   Formatting of this note might be different from the original.   2015 problems with vision and expressive aphasia resolved rapidly within days.  Formatting of this note might be different from the original.   2015 problems with vision and expressive aphasia resolved rapidly within days.      Diabetes mellitus type 2, insulin dependent (H) 12/27/2017     Priority: Medium    ST elevation myocardial infarction involving right coronary artery (H) 12/12/2017     Priority: Medium    Coronary artery disease involving native coronary artery of native heart with angina pectoris (H) 12/03/2017     Priority: Medium     Formatting of this note might be different from the original.     05/21/21:  ALESIA- OM1 (2.5 x 12 Synergy postdilated 2.75 balloon); [facility]ESIA from [facility] into OM2 (3.0 x 12 Synergy postdialted w/ 3.75 balloon)     12/3/17: ALESIA- RCA (3 x 26 [place])      Coronary atherosclerosis 12/03/2017     Priority: Medium     Formatting of this note might be different from the original.   12/3/17-3 x 26 susan ALESIA RCA      Chronic systolic congestive heart failure (H) 11/27/2017     Priority: Medium    Type 2 DM with hypertension and ESRD on dialysis (H) 03/12/2017     Priority: Medium      Formatting of this note might be different from the original.     Diabetic nephropathy and hypertensive nephrosclerosis clinically     Referred to [facility] for transplant and found to have a right renal mass 3-2021. Also multiple cystic lesions in both kidneys including completx cysts in the left.     Initiated PD 5/3/54606, catheter placed 4/16/2021      Dyslipidemia 01/11/2016     Priority: Medium    Systolic heart failure (H) 07/17/2015     Priority: Medium    Chronic kidney disease, stage IV (severe) (H) 07/15/2015     Priority: Medium    Hypertensive urgency 07/15/2015     Priority: Medium    Ischemic stroke (H) 07/15/2015     Priority: Medium    Left homonymous hemianopsia 07/15/2015     Priority: Medium    Other specified abnormal findings of blood chemistry 07/15/2015     Priority: Medium    Type 2 diabetes mellitus (H) 07/15/2015     Priority: Medium    Gout 06/13/2011     Priority: Medium    Mixed hypercholesterolemia and hypertriglyceridemia 06/29/2009     Priority: Medium        Past Medical History:    Past Medical History:   Diagnosis Date    CAD (coronary artery disease)     Chronic systolic heart failure (H)     ESRD (end stage renal disease) on dialysis (H)     Gastroesophageal reflux disease without esophagitis     Gout     HLD (hyperlipidemia)     TALI (obstructive sleep apnea)     PAD (peripheral artery disease) (H)     Type 2 diabetes mellitus with diabetic neuropathy (H)        Past Surgical History:    No past surgical history on file.    Family History:    No family history on file.    Social History:  Marital Status:   [2]  Social History     Tobacco Use    Smoking status: Former     Types: Cigarettes    Smokeless tobacco: Never   Substance Use Topics    Alcohol use: Not Currently     Comment: quit 20 years        Medications:    No current facility-administered medications for this encounter.     No current outpatient medications on file.     Facility-Administered Medications Ordered in  Other Encounters   Medication Dose Route Frequency Provider Last Rate Last Admin    acetaminophen (TYLENOL) tablet 1,000 mg  1,000 mg Oral BID Chai España MD   1,000 mg at 10/15/24 2025    acetaminophen-codeine (TYLENOL #3) 300-30 MG per tablet 1 tablet  1 tablet Oral Q6H PRN Chai España MD        allopurinol (ZYLOPRIM) tablet 200 mg  200 mg Oral QPM Chai España MD   200 mg at 10/15/24 2026    atorvastatin (LIPITOR) tablet 40 mg  40 mg Oral At Bedtime Chai España MD   40 mg at 10/15/24 2025    calcitRIOL (ROCALTROL) capsule 0.25 mcg  0.25 mcg Oral At Bedtime Chai España MD   0.25 mcg at 10/15/24 2025    calcium carbonate (TUMS) chewable tablet 1,000 mg  1,000 mg Oral 4x Daily PRN Chai España MD        carvedilol (COREG) tablet 12.5 mg  12.5 mg Oral At Bedtime Chai España MD   12.5 mg at 10/15/24 2026    cinacalcet (SENSIPAR) tablet 60 mg  60 mg Oral Once per day on Monday Wednesday Friday Chai España MD        glucose gel 15-30 g  15-30 g Oral Q15 Min PRN Chai España MD        Or    dextrose 50 % injection 25-50 mL  25-50 mL Intravenous Q15 Min PRChai Méndez MD        Or    glucagon injection 1 mg  1 mg Subcutaneous Q15 Min PRChai Méndez MD        insulin aspart (NovoLOG) injection (RAPID ACTING)  1-7 Units Subcutaneous TID AC Chai España MD        insulin aspart (NovoLOG) injection (RAPID ACTING)  1-5 Units Subcutaneous At Bedtime Chai España MD        lidocaine (LMX4) cream   Topical Q1H PRN Chai España MD        lidocaine 1 % 0.1-1 mL  0.1-1 mL Other Q1H PRN Chai España MD        naloxone (NARCAN) injection 0.2 mg  0.2 mg Intravenous Q2 Min PRN Chai España MD        Or    naloxone (NARCAN) injection 0.4 mg  0.4 mg Intravenous Q2 Min PRN Chai España MD        Or    naloxone (NARCAN) injection 0.2 mg  0.2 mg Intramuscular Q2 Min PRN Chai España MD        Or    naloxone  (NARCAN) injection 0.4 mg  0.4 mg Intramuscular Q2 Min PRN Chai España MD        ondansetron (ZOFRAN ODT) ODT tab 4 mg  4 mg Oral Q6H PRN Chai España MD        Or    ondansetron (ZOFRAN) injection 4 mg  4 mg Intravenous Q6H PRN Chai España MD   4 mg at 10/15/24 2119    pantoprazole (PROTONIX) EC tablet 40 mg  40 mg Oral BID Chai España MD   40 mg at 10/15/24 2025    Patient is already receiving anticoagulation with heparin, enoxaparin (LOVENOX), warfarin (COUMADIN)  or other anticoagulant medication   Does not apply Continuous PRN Chai España MD        piperacillin-tazobactam (ZOSYN) 2.25 g vial to attach to  ml bag  2.25 g Intravenous Q8H Chai España MD        sacubitril-valsartan (ENTRESTO) 49-51 MG per tablet 1 tablet  1 tablet Oral BID Chai España MD   1 tablet at 10/15/24 2025    senna-docusate (SENOKOT-S/PERICOLACE) 8.6-50 MG per tablet 1 tablet  1 tablet Oral BID PRN Chai España MD        Or    senna-docusate (SENOKOT-S/PERICOLACE) 8.6-50 MG per tablet 2 tablet  2 tablet Oral BID PRN Chai España MD        sevelamer carbonate (RENVELA) tablet 800 mg  800 mg Oral TID w/meals Chai España MD        sodium chloride (PF) 0.9% PF flush 3 mL  3 mL Intracatheter Q8H Chai España MD   3 mL at 10/15/24 2035    sodium chloride (PF) 0.9% PF flush 3 mL  3 mL Intracatheter q1 min prn Chai España MD        torsemide (DEMADEX) tablet 100 mg  100 mg Oral QAM Chai España MD        Warfarin Dose Required Daily - Pharmacist Managed  1 each Oral See Admin Instructions Chai España MD               Review of Systems  All other ROS reviewed and are negative or non-contributory except as stated in HPI.     Physical Exam   BP: 113/82  Pulse: 56  Temp: 98  F (36.7  C)  Resp: 20  Weight: 78.6 kg (173 lb 4.5 oz)  SpO2: 99 %      Physical Exam  Vitals and nursing note reviewed.   Constitutional:       Appearance: He is  normal weight.      Comments: Very pleasant gentleman sitting in the bed   HENT:      Head: Normocephalic.      Nose: Nose normal.   Eyes:      Extraocular Movements: Extraocular movements intact.      Conjunctiva/sclera: Conjunctivae normal.   Cardiovascular:      Rate and Rhythm: Normal rate and regular rhythm.      Heart sounds: Normal heart sounds.      Comments: Unable to palpate pedal pulses  Pulmonary:      Effort: Pulmonary effort is normal.   Musculoskeletal:      Cervical back: Normal range of motion and neck supple.      Comments: After bandage removed, left great toe black with eschar, please see picture.  There does not appear to be any significant drainage or oozing.  He has tenderness especially extending anteriorly on the foot and ankle, tenderness along all of the reddened discolored area.  Please see picture.  He has some small scabbed areas on the top of the third and fourth toes.  Skin is peeling.  Red/purpleish discoloration with some petechial changes on the dorsum of the foot extending up the ankle and then streaking up the front of the lower leg with some very slight pinkness above the darker area.   Skin:     Comments: Bilateral feet dry and peeling   Neurological:      General: No focal deficit present.      Mental Status: He is alert and oriented to person, place, and time.   Psychiatric:         Mood and Affect: Mood normal.         Behavior: Behavior normal.               ED Course (with Medical Decision Making)    Pt seen and examined by me.  RN and EPIC notes reviewed.       Patient with known peripheral artery/peripheral vascular disease, end-stage renal disease and diabetes with neuropathy presenting with a wound to his left great toe, now with significant worsening, redness, tenderness, streaking redness.  He has been somewhat nonadherent to his Keflex dosing postprocedure 8 days ago with partial nail removal.    Obviously, very concerned for viability of the toe and healing along  with significant infection especially when comparing pictures from 10/7/2024 and today.    Plan IV, labs, antibiotics.  Talk with podiatry.    Patient has elevated CRP and ESR.  White count normal.  Blood sugar 132.    I spoke with Dr. Siddiqui, podiatry.  He feels patient needs to be seen and evaluated by vascular.  Patient likely needs to be seen by Ortho/podiatry and even infectious disease also.  All of this was discussed at length with the patient.  Transfer request placed and I spoke with the triage hospitalist.  Patient accepted for transfer to [facility].  Transferred via EMS.  Patient received Vanco and Zosyn here in the ED.  This was dosed by pharmacy for renal dosing.        Procedures    Results for orders placed or performed during the hospital encounter of 10/15/24 (from the past 24 hour(s))   Glucose by meter   Result Value Ref Range    GLUCOSE BY METER POCT 132 (H) 70 - 99 mg/dL   CBC with platelets differential    Narrative    The following orders were created for panel order CBC with platelets differential.  Procedure                               Abnormality         Status                     ---------                               -----------         ------                     CBC with platelets and d...[[id number]]  Abnormal            Final result                 Please view results for these tests on the individual orders.   CRP inflammation   Result Value Ref Range    CRP Inflammation 55.81 (H) <5.00 mg/L   Erythrocyte sedimentation rate auto   Result Value Ref Range    Erythrocyte Sedimentation Rate 86 (H) 0 - 20 mm/hr   CBC with platelets and differential   Result Value Ref Range    WBC Count 7.5 4.0 - 11.0 10e3/uL    RBC Count 3.63 (L) 4.40 - 5.90 10e6/uL    Hemoglobin 12.5 (L) 13.3 - 17.7 g/dL    Hematocrit 35.8 (L) 40.0 - 53.0 %    MCV 99 78 - 100 fL    MCH 34.4 (H) 26.5 - 33.0 pg    MCHC 34.9 31.5 - 36.5 g/dL    RDW 14.6 10.0 - 15.0 %    Platelet Count 299 150 - 450 10e3/uL    %  Neutrophils 71 %    % Lymphocytes 12 %    % Monocytes 8 %    % Eosinophils 8 %    % Basophils 1 %    % Immature Granulocytes 0 %    NRBCs per 100 WBC 0 <1 /100    Absolute Neutrophils 5.3 1.6 - 8.3 10e3/uL    Absolute Lymphocytes 0.9 0.8 - 5.3 10e3/uL    Absolute Monocytes 0.6 0.0 - 1.3 10e3/uL    Absolute Eosinophils 0.6 0.0 - 0.7 10e3/uL    Absolute Basophils 0.0 0.0 - 0.2 10e3/uL    Absolute Immature Granulocytes 0.0 <=0.4 10e3/uL    Absolute NRBCs 0.0 10e3/uL       Medications   piperacillin-tazobactam (ZOSYN) 2.25 g vial to attach to  ml bag (0 g Intravenous Stopped 10/15/24 1425)   vancomycin (VANCOCIN) 1,500 mg in sodium chloride 0.9 % 290 mL intermittent infusion (0 mg Intravenous Stopped 10/15/24 1600)       Assessments & Plan     I have reviewed the findings, diagnosis, plan with the patient.    Discharge Medication List as of 10/15/2024  5:45 PM          Final diagnoses:   Ulcer of great toe, left, with unspecified severity (H)   Toe infection   Peripheral arterial disease (H)   Peritoneal dialysis catheter in place (H)     Disposition: Patient transferred to [facility] via EMS in stable condition.    Note: Chart documentation done in part with Dragon Voice Recognition software. Although reviewed after completion, some word and grammatical errors may remain.     10/15/2024   [facility] EMERGENCY DEPT       Saida Nunez MD  10/16/24 0132

## 2024-10-15 NOTE — PROGRESS NOTES
Transfer Type: [facility]  Transfer Triage Note    Date of call: 10/15/24  Time of call: 2:37 PM    Current Patient Location:  [facility] ED  Current Level of Care: ED    Vitals: Temp: 98  F (36.7  C) Temp src: Temporal BP: 113/82 Pulse: 56   Resp: 20 SpO2: 99 %   Weight: 78.6 kg (173 lb 4.5 oz)  O2 Device: None (Room air) at    Diagnosis: PAD, toe ulceration  Reason for requested transfer: Further diagnostic work up, management, and consultation for specialized care   Isolation Needs: None    Care everywhere has been updated and reviewed: Yes  Necessary images have been sent through PACS: Yes    If patient is transferring for specialty care or specific procedure, the specialist required has participated in the transfer call and agreed with need for transfer and anticipated timeline: No    Transfer accepted: Yes  Stability of Patient: Patient is vitally stable, with no critical labs, and will likely remain stable throughout the transfer process  Is the patient appropriate for [facility]? [facility]  Level of Care Needed: Med Surg  Telemetry Needed:  None  Expected Time of Arrival for Transfer: 0-8 hours  Arrival Location:  [facility]     Recommendations for Management and Stabilization: Not needed    Additional Comments:   63 year old male with ESRD on peritoneal dialysis, CAD, A fib, PAD who presented to [facility] ED with worsening pain and redness in Left toe with streaking up the leg (see media), started on antibiotics IV, recent ABIs with no flow. Transfer accepted to [facility] for the following:  Vascular surgery  Orthopedics    Will also need peritoneal dialysis orders    Benedicto Farrell MD

## 2024-10-15 NOTE — PROGRESS NOTES
ANTICOAGULATION  MANAGEMENT: Discharge Review    Arnulfo Pritchett chart reviewed for anticoagulation continuity of care    Emergency room visit on 10/15/2024 for ulcer great toe with wound infection.    Discharge disposition:  Admission pending    Results:    Recent labs: (last 7 days)     10/11/24  1452   INR 2.2*     Anticoagulation inpatient management:     Managed by inpatient    Anticoagulation discharge instructions:     Warfarin dosing:  Orders not complete will need follow up upon discharge.   Medication changes affecting anticoagulation: Yes: Vancomycin IV and Zosyn       PLAN     Will need follow up upon discharge. Transferring to susan King RN  10/15/2024  Anticoagulation Clinic  [facility] for routing messages: merlin MCNALLY [facility] patient phone line: [phone]

## 2024-10-15 NOTE — MEDICATION SCRIBE - ADMISSION MEDICATION HISTORY
Medication Scribe Admission Medication History    Admission medication history is complete. The information provided in this note is only as accurate as the sources available at the time of the update.    Information Source(s): Patient via in-person    Pertinent Information:     Changes made to PTA medication list:  Added: Dialyvite Multivitamin from reconcile list as confimred by patient   Deleted: None  Changed: Acetaminophen 325 mg changed to 500 mg tablets   Calcitrol changed from Monday, Wednesday, Friday to taking daily at HS   Cinacalcet changed from daily to taking Monday, Wednesday, Friday   Warfarin split to differentiate different dosages throughout the week     Allergies reviewed with patient and updates made in EHR: yes    Medication History Completed By: KAIDEN ROSENBERG 10/15/2024 2:55 PM    PTA Med List   Medication Sig Note Last Dose    acetaminophen (TYLENOL) 500 MG tablet Take 1,000 mg by mouth every 8 hours as needed.  10/15/2024 at a m    acetaminophen-codeine (TYLENOL #3) 300-30 MG per tablet Take 1 tablet by mouth every 6 hours as needed for severe pain.  10/14/2024 at hs    allopurinol (ZYLOPRIM) 100 MG tablet Take 200 mg by mouth every evening  10/14/2024 at hs    atorvastatin (LIPITOR) 40 MG tablet Take 40 mg by mouth at bedtime.  10/14/2024 at hs    B Complex-C-Folic Acid (DIALYVITE) TABS Take 1 tablet by mouth daily. 10/15/2024: Not taken consistently  Past Week at unknown    calcitRIOL (ROCALTROL) 0.25 MCG capsule Take 0.25 mcg by mouth at bedtime.  10/14/2024 at hs    carvedilol (COREG) 25 MG tablet Take 12.5 mg by mouth 2 times daily (with meals)  10/15/2024 at am    cephALEXin (KEFLEX) 250 MG capsule Take 1 capsule (250 mg) by mouth 2 times daily for 10 days. 10/15/2024: Started 10/7/24 night; taking 1 capsule every night and not as ordered  10/14/2024 at hs    cinacalcet (SENSIPAR) 60 MG tablet Take 60 mg by mouth. Take 1 tablet (60 mg) three times a week: Monday, Wednesday, and Friday  nights  10/14/2024 at hs    gentamicin (GARAMYCIN) 0.1 % external cream Apply topically daily. Apply topically on peritoneal access site to prevent infections 10/15/2024: Utilizes 2-3 times weekly  Past Week at hs    insulin glargine (LANTUS PEN) 100 UNIT/ML pen Inject 39 Units subcutaneously at bedtime.  10/14/2024 at hs    melatonin 5 MG tablet Take 5 mg by mouth at bedtime  10/14/2024 at hs    omeprazole (PRILOSEC) 40 MG DR capsule Take 40 mg by mouth daily.  10/14/2024 at am    PLAVIX 75 MG tablet Take 75 mg by mouth every evening  10/14/2024 at hs    sacubitril-valsartan (ENTRESTO) 49-51 MG per tablet Take 1 tablet by mouth 2 times daily  10/15/2024 at am    SEVELAMER CARBONATE PO Take 800 mg by mouth 3 times daily (with meals) 10/15/2024: Does not take as ordered; takes about 2 times daily  10/14/2024 at hs    torsemide (DEMADEX) 100 MG tablet Take 100 mg by mouth every morning  10/14/2024 at am    warfarin ANTICOAGULANT (COUMADIN) 5 MG tablet Take 7.5 mg by mouth. Take 7.5 mg (1.5 tablet) Sunday, Tuesday, Thursday, Saturday nights and 1 tablet all other nights  10/13/2024 at hs    warfarin ANTICOAGULANT (COUMADIN) 5 MG tablet Take 5 mg by mouth. Take 5 mg Monday, Wednesday, Friday nights and 1.5 tablet all other days or as directed by coumadin clinic  10/14/2024 at hs

## 2024-10-16 ENCOUNTER — DOCUMENTATION ONLY (OUTPATIENT)
Dept: ORTHOPEDICS | Facility: CLINIC | Age: 64
End: 2024-10-16

## 2024-10-16 ENCOUNTER — APPOINTMENT (OUTPATIENT)
Dept: ULTRASOUND IMAGING | Facility: CLINIC | Age: 64
DRG: 252 | End: 2024-10-16
Payer: MEDICARE

## 2024-10-16 ENCOUNTER — APPOINTMENT (OUTPATIENT)
Dept: MRI IMAGING | Facility: CLINIC | Age: 64
DRG: 252 | End: 2024-10-16
Attending: INTERNAL MEDICINE
Payer: MEDICARE

## 2024-10-16 LAB
ANION GAP SERPL CALCULATED.3IONS-SCNC: 13 MMOL/L (ref 7–15)
ANION GAP SERPL CALCULATED.3IONS-SCNC: 13 MMOL/L (ref 7–15)
BUN SERPL-MCNC: 63.9 MG/DL (ref 8–23)
BUN SERPL-MCNC: 65.3 MG/DL (ref 8–23)
CALCIUM SERPL-MCNC: 8.9 MG/DL (ref 8.8–10.4)
CALCIUM SERPL-MCNC: 9.1 MG/DL (ref 8.8–10.4)
CHLORIDE SERPL-SCNC: 95 MMOL/L (ref 98–107)
CHLORIDE SERPL-SCNC: 96 MMOL/L (ref 98–107)
CREAT SERPL-MCNC: 10.34 MG/DL (ref 0.67–1.17)
CREAT SERPL-MCNC: 10.72 MG/DL (ref 0.67–1.17)
EGFRCR SERPLBLD CKD-EPI 2021: 5 ML/MIN/1.73M2
EGFRCR SERPLBLD CKD-EPI 2021: 5 ML/MIN/1.73M2
ERYTHROCYTE [DISTWIDTH] IN BLOOD BY AUTOMATED COUNT: 14.6 % (ref 10–15)
EST. AVERAGE GLUCOSE BLD GHB EST-MCNC: 117 MG/DL
GLUCOSE BLDC GLUCOMTR-MCNC: 118 MG/DL (ref 70–99)
GLUCOSE BLDC GLUCOMTR-MCNC: 124 MG/DL (ref 70–99)
GLUCOSE BLDC GLUCOMTR-MCNC: 125 MG/DL (ref 70–99)
GLUCOSE BLDC GLUCOMTR-MCNC: 288 MG/DL (ref 70–99)
GLUCOSE BLDC GLUCOMTR-MCNC: 94 MG/DL (ref 70–99)
GLUCOSE SERPL-MCNC: 94 MG/DL (ref 70–99)
GLUCOSE SERPL-MCNC: 96 MG/DL (ref 70–99)
HBA1C MFR BLD: 5.7 %
HBV SURFACE AG SERPL QL IA: NONREACTIVE
HCO3 SERPL-SCNC: 24 MMOL/L (ref 22–29)
HCO3 SERPL-SCNC: 24 MMOL/L (ref 22–29)
HCT VFR BLD AUTO: 33.4 % (ref 40–53)
HGB BLD-MCNC: 10.9 G/DL (ref 13.3–17.7)
INR PPP: 4.41 (ref 0.85–1.15)
MCH RBC QN AUTO: 33.1 PG (ref 26.5–33)
MCHC RBC AUTO-ENTMCNC: 32.6 G/DL (ref 31.5–36.5)
MCV RBC AUTO: 102 FL (ref 78–100)
PLATELET # BLD AUTO: 271 10E3/UL (ref 150–450)
POTASSIUM SERPL-SCNC: 5.7 MMOL/L (ref 3.4–5.3)
POTASSIUM SERPL-SCNC: 6 MMOL/L (ref 3.4–5.3)
RBC # BLD AUTO: 3.29 10E6/UL (ref 4.4–5.9)
SODIUM SERPL-SCNC: 132 MMOL/L (ref 135–145)
SODIUM SERPL-SCNC: 133 MMOL/L (ref 135–145)
WBC # BLD AUTO: 5.3 10E3/UL (ref 4–11)

## 2024-10-16 PROCEDURE — 120N000001 HC R&B MED SURG/OB

## 2024-10-16 PROCEDURE — 80048 BASIC METABOLIC PNL TOTAL CA: CPT | Performed by: STUDENT IN AN ORGANIZED HEALTH CARE EDUCATION/TRAINING PROGRAM

## 2024-10-16 PROCEDURE — 250N000013 HC RX MED GY IP 250 OP 250 PS 637: Performed by: INTERNAL MEDICINE

## 2024-10-16 PROCEDURE — 36415 COLL VENOUS BLD VENIPUNCTURE: CPT | Performed by: INTERNAL MEDICINE

## 2024-10-16 PROCEDURE — G1010 CDSM STANSON: HCPCS

## 2024-10-16 PROCEDURE — L3660 SO 8 AB RSTR CAN/WEB PRE OTS: HCPCS

## 2024-10-16 PROCEDURE — 90945 DIALYSIS ONE EVALUATION: CPT

## 2024-10-16 PROCEDURE — 83036 HEMOGLOBIN GLYCOSYLATED A1C: CPT | Performed by: INTERNAL MEDICINE

## 2024-10-16 PROCEDURE — 250N000012 HC RX MED GY IP 250 OP 636 PS 637: Performed by: INTERNAL MEDICINE

## 2024-10-16 PROCEDURE — 99222 1ST HOSP IP/OBS MODERATE 55: CPT | Performed by: INTERNAL MEDICINE

## 2024-10-16 PROCEDURE — 87340 HEPATITIS B SURFACE AG IA: CPT | Performed by: INTERNAL MEDICINE

## 2024-10-16 PROCEDURE — 73718 MRI LOWER EXTREMITY W/O DYE: CPT | Mod: LT,MG

## 2024-10-16 PROCEDURE — 258N000001 HC RX 258: Performed by: INTERNAL MEDICINE

## 2024-10-16 PROCEDURE — 99233 SBSQ HOSP IP/OBS HIGH 50: CPT | Performed by: STUDENT IN AN ORGANIZED HEALTH CARE EDUCATION/TRAINING PROGRAM

## 2024-10-16 PROCEDURE — 3E1M39Z IRRIGATION OF PERITONEAL CAVITY USING DIALYSATE, PERCUTANEOUS APPROACH: ICD-10-PCS | Performed by: INTERNAL MEDICINE

## 2024-10-16 PROCEDURE — 80048 BASIC METABOLIC PNL TOTAL CA: CPT | Performed by: INTERNAL MEDICINE

## 2024-10-16 PROCEDURE — 85027 COMPLETE CBC AUTOMATED: CPT | Performed by: INTERNAL MEDICINE

## 2024-10-16 PROCEDURE — 36415 COLL VENOUS BLD VENIPUNCTURE: CPT | Performed by: STUDENT IN AN ORGANIZED HEALTH CARE EDUCATION/TRAINING PROGRAM

## 2024-10-16 PROCEDURE — 258N000003 HC RX IP 258 OP 636: Performed by: INTERNAL MEDICINE

## 2024-10-16 PROCEDURE — 99207 PR NO BILLABLE SERVICE THIS VISIT: CPT

## 2024-10-16 PROCEDURE — 250N000011 HC RX IP 250 OP 636: Performed by: INTERNAL MEDICINE

## 2024-10-16 PROCEDURE — 93926 LOWER EXTREMITY STUDY: CPT | Mod: LT

## 2024-10-16 PROCEDURE — 85610 PROTHROMBIN TIME: CPT | Performed by: INTERNAL MEDICINE

## 2024-10-16 PROCEDURE — 99222 1ST HOSP IP/OBS MODERATE 55: CPT | Performed by: PODIATRIST

## 2024-10-16 PROCEDURE — 73718 MRI LOWER EXTREMITY W/O DYE: CPT | Mod: 26 | Performed by: RADIOLOGY

## 2024-10-16 PROCEDURE — G1010 CDSM STANSON: HCPCS | Performed by: RADIOLOGY

## 2024-10-16 PROCEDURE — 84295 ASSAY OF SERUM SODIUM: CPT | Performed by: INTERNAL MEDICINE

## 2024-10-16 PROCEDURE — 99222 1ST HOSP IP/OBS MODERATE 55: CPT | Mod: GC | Performed by: SURGERY

## 2024-10-16 RX ORDER — DEXTROSE MONOHYDRATE 25 G/50ML
50 INJECTION, SOLUTION INTRAVENOUS ONCE
Status: COMPLETED | OUTPATIENT
Start: 2024-10-16 | End: 2024-10-16

## 2024-10-16 RX ORDER — CALCIUM GLUCONATE 20 MG/ML
1 INJECTION, SOLUTION INTRAVENOUS ONCE
Status: COMPLETED | OUTPATIENT
Start: 2024-10-16 | End: 2024-10-16

## 2024-10-16 RX ORDER — CARVEDILOL 6.25 MG/1
6.25 TABLET ORAL AT BEDTIME
Status: DISCONTINUED | OUTPATIENT
Start: 2024-10-16 | End: 2024-11-09 | Stop reason: HOSPADM

## 2024-10-16 RX ORDER — GENTAMICIN SULFATE 1 MG/G
CREAM TOPICAL DAILY PRN
Status: DISCONTINUED | OUTPATIENT
Start: 2024-10-16 | End: 2024-10-18

## 2024-10-16 RX ORDER — HEPARIN SODIUM 200 [USP'U]/100ML
1 INJECTION, SOLUTION INTRAVENOUS EVERY 5 MIN PRN
Status: DISCONTINUED | OUTPATIENT
Start: 2024-10-17 | End: 2024-10-17 | Stop reason: HOSPADM

## 2024-10-16 RX ADMIN — SODIUM ZIRCONIUM CYCLOSILICATE 10 G: 5 POWDER, FOR SUSPENSION ORAL at 21:12

## 2024-10-16 RX ADMIN — ACETAMINOPHEN 1000 MG: 500 TABLET, FILM COATED ORAL at 09:23

## 2024-10-16 RX ADMIN — ATORVASTATIN CALCIUM 40 MG: 40 TABLET, FILM COATED ORAL at 21:04

## 2024-10-16 RX ADMIN — SODIUM ZIRCONIUM CYCLOSILICATE 10 G: 5 POWDER, FOR SUSPENSION ORAL at 17:35

## 2024-10-16 RX ADMIN — ALLOPURINOL 200 MG: 100 TABLET ORAL at 21:04

## 2024-10-16 RX ADMIN — GENTAMICIN SULFATE: 1 CREAM TOPICAL at 18:34

## 2024-10-16 RX ADMIN — ACETAMINOPHEN AND CODEINE PHOSPHATE 1 TABLET: 300; 30 TABLET ORAL at 18:08

## 2024-10-16 RX ADMIN — SEVELAMER CARBONATE 800 MG: 800 TABLET, FILM COATED ORAL at 18:08

## 2024-10-16 RX ADMIN — PIPERACILLIN AND TAZOBACTAM 2.25 G: 2; .25 INJECTION, POWDER, FOR SOLUTION INTRAVENOUS at 21:04

## 2024-10-16 RX ADMIN — SACUBITRIL AND VALSARTAN 1 TABLET: 49; 51 TABLET, FILM COATED ORAL at 09:23

## 2024-10-16 RX ADMIN — SEVELAMER CARBONATE 800 MG: 800 TABLET, FILM COATED ORAL at 12:42

## 2024-10-16 RX ADMIN — CARVEDILOL 6.25 MG: 6.25 TABLET, FILM COATED ORAL at 21:04

## 2024-10-16 RX ADMIN — INSULIN ASPART 2 UNITS: 100 INJECTION, SOLUTION INTRAVENOUS; SUBCUTANEOUS at 22:29

## 2024-10-16 RX ADMIN — PANTOPRAZOLE SODIUM 40 MG: 40 TABLET, DELAYED RELEASE ORAL at 09:23

## 2024-10-16 RX ADMIN — CALCITRIOL CAPSULES 0.25 MCG 0.25 MCG: 0.25 CAPSULE ORAL at 21:04

## 2024-10-16 RX ADMIN — ACETAMINOPHEN 1000 MG: 500 TABLET, FILM COATED ORAL at 21:04

## 2024-10-16 RX ADMIN — CALCIUM GLUCONATE 1 G: 20 INJECTION, SOLUTION INTRAVENOUS at 12:38

## 2024-10-16 RX ADMIN — PIPERACILLIN AND TAZOBACTAM 2.25 G: 2; .25 INJECTION, POWDER, FOR SOLUTION INTRAVENOUS at 12:05

## 2024-10-16 RX ADMIN — ACETAMINOPHEN AND CODEINE PHOSPHATE 1 TABLET: 300; 30 TABLET ORAL at 01:51

## 2024-10-16 RX ADMIN — PIPERACILLIN AND TAZOBACTAM 2.25 G: 2; .25 INJECTION, POWDER, FOR SOLUTION INTRAVENOUS at 05:08

## 2024-10-16 RX ADMIN — SEVELAMER CARBONATE 800 MG: 800 TABLET, FILM COATED ORAL at 09:23

## 2024-10-16 RX ADMIN — CINACALCET 60 MG: 30 TABLET ORAL at 09:23

## 2024-10-16 RX ADMIN — PANTOPRAZOLE SODIUM 40 MG: 40 TABLET, DELAYED RELEASE ORAL at 21:04

## 2024-10-16 RX ADMIN — ACETAMINOPHEN AND CODEINE PHOSPHATE 1 TABLET: 300; 30 TABLET ORAL at 12:03

## 2024-10-16 RX ADMIN — SODIUM CHLORIDE 5 UNITS: 9 INJECTION, SOLUTION INTRAVENOUS at 12:39

## 2024-10-16 RX ADMIN — DEXTROSE MONOHYDRATE 50 ML: 25 INJECTION, SOLUTION INTRAVENOUS at 12:38

## 2024-10-16 ASSESSMENT — ACTIVITIES OF DAILY LIVING (ADL)
ADLS_ACUITY_SCORE: 31
ADLS_ACUITY_SCORE: 30
ADLS_ACUITY_SCORE: 31
ADLS_ACUITY_SCORE: 30
ADLS_ACUITY_SCORE: 31
ADLS_ACUITY_SCORE: 30
ADLS_ACUITY_SCORE: 30
ADLS_ACUITY_SCORE: 31
ADLS_ACUITY_SCORE: 30
ADLS_ACUITY_SCORE: 31
ADLS_ACUITY_SCORE: 30
ADLS_ACUITY_SCORE: 31
DEPENDENT_IADLS:: INDEPENDENT
ADLS_ACUITY_SCORE: 31
ADLS_ACUITY_SCORE: 30
ADLS_ACUITY_SCORE: 30
ADLS_ACUITY_SCORE: 31
ADLS_ACUITY_SCORE: 31
ADLS_ACUITY_SCORE: 30
ADLS_ACUITY_SCORE: 30

## 2024-10-16 NOTE — CONSULTS
Patient is on IR schedule Thursday 10/17/24 for a Left leg angiogram with possible angioplasty and/or stent placement with IV moderate sedation.     Arnulfo Pritchett is a 63-year-old male, history of end-stage renal disease on peritoneal dialysis nightly, type 2 diabetes, renal cell carcinoma status post right nephrectomy, atrial fibrillation, coronary artery disease and cardiomyopathy, hypertension, hyperlipidemia, gout, peripheral artery disease presenting with wound infection.  Patient was hospitalized in July 2024 for concerns of a diabetic left great toe infection.  He was seen by podiatry, infectious disease, managed on IV Vanco and Zosyn and discharged on Augmentin.  He has been managed by podiatry and most recently was seen on 10/7/2024 by Dr. Siddiqui.      Patient notes that over the last 1 to 2 days he has had increasing pain, throbbing, redness and now has a red streak going up his left foot and leg extending from the left toe.  He has not been soaking the toe as instructed. No fevers.  He has been managing his pain with intermittent Tylenol with codeine.  He acknowledges peripheral neuropathy but notes that the foot and toe have been throbbing, especially when he tries to have his feet down.  He has mostly been sitting with them elevated is much as possible.    -Labs WNL for procedure.    -Orders for NPO have been entered.   -Consent will be done prior to procedure.     Temp:  [97.6  F (36.4  C)-98.5  F (36.9  C)] 98.5  F (36.9  C)  Pulse:  [87-92] 91  Resp:  [18] 18  BP: ()/(70-93) 108/72  SpO2:  [95 %-98 %] 98 %    ROUTINE ICU LABS (Last four results)  CMP  Recent Labs   Lab 10/16/24  1207 10/16/24  0743 10/16/24  0642 10/16/24  0203 10/15/24  2156 10/15/24  2052   NA  --   --  133*  --   --   --    POTASSIUM  --   --  6.0*  --   --   --    CHLORIDE  --   --  96*  --   --   --    CO2  --   --  24  --   --   --    ANIONGAP  --   --  13  --   --   --    * 94 96 124*   < >  --    BUN  --   --   63.9*  --   --   --    CR  --   --  10.34*  --   --  9.84*   GFRESTIMATED  --   --  5*  --   --  5*   TERENCE  --   --  9.1  --   --   --     < > = values in this interval not displayed.     CBC  Recent Labs   Lab 10/16/24  0642 10/15/24  1317   WBC 5.3 7.5   RBC 3.29* 3.63*   HGB 10.9* 12.5*   HCT 33.4* 35.8*   * 99   MCH 33.1* 34.4*   MCHC 32.6 34.9   RDW 14.6 14.6    299     INR  Recent Labs   Lab 10/16/24  0642 10/15/24  2020 10/11/24  1452   INR 4.41* 4.42* 2.2*     [place] [phone] for procedural related questions.     Discussed with IR Dr Lopez today.    Total time: 20 minutes     Thanks, Heidi [facility] Interventional Radiology CNP ([phone]) (phone [phone])

## 2024-10-16 NOTE — PROGRESS NOTES
S: Arnulfo was seen at the [facility], Room 6614 for the evaluation, fit and delivery of a left Offloading Shoe.    O: The patient was fit with size medium Offloading shoe. The 1st toe was offloaded by removing foam insole peds under and around the area of concern.    A: After modifying the removable peg foam sole inserts to offload the 1st toe area, the shoe was fit to the patient and the dorsum straps were trimmed.    P: The Offloading Shoes are to be worn when the patient is out of bed/weight bearing. The manufacture s written instructions about the care and use of the shoes.    G: The goal of these modified Offloading Shoes is to reduce the pressure on the patient s 1st toe while the patient ambulates/bears weight.    Please contact the [facility] Department Office at [phone] if you have any questions. Thank you, Jarvis    Note electronically signed by Jarvis Sandhu, Board Eligible

## 2024-10-16 NOTE — CONSULTS
[place] [facility]/FOOT [facility] ANKLE SURGERY CONSULTATION NOTE      ASSESSMENT:  62 yo male with Afib on warfarin, GERD, gout, insomnia, ESRD on PD, HFrEF, HTN, IDDM, diabetic peripheral neuropathy, CAD s/p stent placement x 3 (last 2021), TALI, HLD, and RLE PAD s/p balloon angio and stenting of SFA/popliteal arterie admitted for treatment of critical limb ischemia, LLE, and likely osteomyelitis of the left hallux distal phalanx.    MEDICAL DECISION MAKING:   Plans by hospitalist and vascular surgery reviewed.  Although painful, the left hallux and associated cellulitis appears stable.  The ischemic area is dry.  No signs of any rapid spread of infection or deterioration.  Therefore, will await findings and intervention by vascular surgery.  No urgency to podiatric surgery.  I anticipate Arnulfo will need at least a partial left hallux amputation, once/if vascular status is optimized.  Await MRI findings  Continue IV Zosyn  Podiatry will continue to follow.  Will request a surgical shoe to offload the toe.    Dr. Salas will follow-up to discuss interval results and the care plan tomorrow.    Jarvis Esquivel DPM, FACFAS, MS  [facility] Department of Podiatry/Foot & Ankle Surgery    Disclaimer: This note consists of symbols derived from keyboarding, dictation and/or voice recognition software. As a result, there may be errors in the script that have gone undetected. Please consider this when interpreting information found in this chart.      _______________________________________________________________________________________________________________________________________________    CHIEF COMPLAINT:      I was asked by   Saida Nunez MD    to evaluate this patient, Arnulfo Pritchett, for a left foot infection.    PATIENT HISTORY:     Mr. Arnulfo Pritchett is a 62 yo male with Afib on warfarin, GERD, gout, insomnia, ESRD on PD, HFrEF, HTN, IDDM, diabetic peripheral neuropathy, CAD s/p stent placement x 3 (last 2021),  TALI, HLD, and RLE PAD s/p balloon angio and stenting of SFA/popliteal arteries presented to [facility] ED with worsening throbbing pain and redness in his left great toe with streaking up the leg for the last 2 days who was transferred to [facility] in anticipation of vascular surgery intervention.     Arnulfo reports having a wound on the left great toe for approximately 1 month.  He was treated by my partner Dr. Daniel Siddiqui, on 10/7/2024 in [place].  Basic wound care instructions were provided, he was prescribed Keflex, a surgical shoe and the risk of nonhealing was discussed, given the patient's PAD and renal disease.  Arnulfo says he was instructed to present to the emergency department if any redness developed.  Redness developed last Monday, and despite not feverish, the progression of redness and pain prompted him to present to the emergency department.      PAST MEDICAL HISTORY:   Past Medical History:   Diagnosis Date    CAD (coronary artery disease)     Chronic systolic heart failure (H)     ESRD (end stage renal disease) on dialysis (H)     Gastroesophageal reflux disease without esophagitis     Gout     HLD (hyperlipidemia)     TALI (obstructive sleep apnea)     PAD (peripheral artery disease) (H)     S/p RLE stenting of SFA/popliteal arteries    Type 2 diabetes mellitus with diabetic neuropathy (H)         PAST SURGICAL HISTORY: No past surgical history on file.     MEDICATIONS:  Reviewed in Epic. Current IV Zosyn.     ALLERGIES:  No Known Allergies     EXAM:  Vitals: /72 (BP Location: Left arm)   Pulse 91   Temp 98.5  F (36.9  C) (Oral)   Resp 18   Wt 76.7 kg (169 lb 1.5 oz)   SpO2 98%   BMI 23.16 kg/m    BMI= Body mass index is 23.16 kg/m .    Dermatologic: See photograph above.  Blackened, dry eschar at the tip of the left hallux.  There is dark, port wine stain like, erythema extending from the toe up to the pretibial area.  No significant associated edema.     Vascular: Dorsalis pedis and  posterior tibial pulses are nonpalpable, bilateral foot.    Neurologic: Lower extremity sensation is diminished, bilateral foot, to light touch.  No evidence of neurological-based weakness or contracture in the lower extremities.       Musculoskeletal: Patient is ambulatory without an assistive device or brace.  No gross foot or ankle     IMAGING:   LEFT FOOT THREE OR MORE VIEWS  10/7/2024 2:05 PM      HISTORY: Left great toe wound. Ulcer of left great toe due to diabetes  mellitus (H).     COMPARISON: None.                                                                       IMPRESSION:  Soft tissue swelling great toe. There is subtle cortical  irregularity and probable erosive change along the distal phalanx/tuft  of the great toe which does raise concern for osteomyelitis. MRI could  be performed for further evaluation as needed.     Extensive atherosclerotic vascular calcifications. Mild scattered  arthritic changes. No evidence of an acute fracture.     MYRIAM DIAZ MD     MRI, left foot is pending     DATE: 10/16/2024     EXAM: LEFT LOWER EXTREMITY ARTERIAL DUPLEX     INDICATION: Peripheral vascular disease      TECHNIQUE: Duplex imaging was performed utilizing gray-scale,  compression, augmentation as appropriate, color-flow, Doppler waveform  analysis, and spectral Doppler imaging.     COMPARISON: [id number].     FINDINGS:      Heavily calcified atherosclerotic disease obscuring visualization of  portions of the left lobe show any vascular structure. Monophasic flow  in the mid and proximal SFA and throughout the remainder of the left  lower extremity. Elevated velocity distal SFA. Significantly abnormal  popliteal and infrapopliteal waveforms.                                                                      IMPRESSION:   1.  Heavily calcified atherosclerotic disease throughout the left  lower extremity obscuring portions of the underlying vasculature.  Waveforms suggest a severe proximal superficial  "femoral artery  stenosis with a likely distal superficial femoral artery stenosis also  seen. Severely abnormal waveforms in the infrapopliteal vessels  suggest severe disease in these vessels as well. Consider CTA with  runoff for further evaluation.     EMERSON HURTADO MD     LABS:   Lab Results   Component Value Date    A1C 5.7 10/16/2024    A1C 5.9 07/09/2024       Lab Results   Component Value Date    INR 4.41 10/16/2024    INR 4.42 10/15/2024    INR 2.2 10/11/2024    INR 4.8 10/08/2024    INR 2.25 07/11/2024    INR 2.60 07/10/2024    INR 3.29 07/09/2024       Lab Results   Component Value Date    WBC 5.3 10/16/2024     Lab Results   Component Value Date    HGB 10.9 10/16/2024     Lab Results   Component Value Date     10/16/2024       All cultures:  No results for input(s): \"CULTURE\" in the last 168 hours.      "

## 2024-10-16 NOTE — PHARMACY-ANTICOAGULATION SERVICE
Clinical Pharmacy - Warfarin Dosing Consult     Pharmacy has been consulted to manage this patient s warfarin therapy.  Indication: Atrial Fibrillation  Therapy Goal: INR 2-3  Warfarin Prior to Admission: Yes  Warfarin PTA Regimen: 5 mg MWF & 1.5 tablet ROW    INR   Date Value Ref Range Status   10/15/2024 4.42 (H) 0.85 - 1.15 Final   10/11/2024 2.2 (H) 0.9 - 1.1 Final       Recommend warfarin no dose today.  Pharmacy will monitor Arnulfo D Shreyas daily and order warfarin doses to achieve specified goal.      Please contact pharmacy as soon as possible if the warfarin needs to be held for a procedure or if the warfarin goals change.

## 2024-10-16 NOTE — H&P
[facility]    History and Physical - Hospitalist Service       Date of Admission:  10/15/2024    Assessment & Plan   Arnulfo Pritchett is a 63 year old male with ESRD on peritoneal dialysis, CAD, A fib, PAD who   Mr. Arnulfo Pritchett is a 62 yo male with Afib on warfarin, GERD, gout, insomnia, ESRD on PD, HFrEF, HTN, IDDM, diabetic peripheral neuropathy, CAD s/p stent placement x 3 (last 2021), TALI, HLD, and RLE PAD s/p balloon angio and stenting of SFA/popliteal arteries presented to [facility] ED with worsening pain and redness in left toe with streaking up the leg  for the last 2 days with throbbing pain.  He was not soaking his toe as instructed, he denies any fevers.  He was seen at [facility] ER and started on Zosyn and for further vascular work up in setting of recent ABIs with no flow.  Patient had an acute right lower extremity arterial occlusion and underwent SFA popliteal balloon angioplasty and stenting emergently on 5/6/2024. He has been managed postprocedure on Plavix and Coumadin.  INR is therapeutic and his BG have been stable and not elevated.      ## Worsening L great toe wound    ## Hx of acute RLE arterial occlusion s/p SFA popliteal balloon angioplasty and stenting, 5/6/2024  Presented to OSH with 2 day history of erythema and streaking redness going up his leg with associated increase pain and throbbing noted.  He has no fever but both CRP 55 and ESR 86 are elevated. XR of left foot show:   Soft tissue swelling great toe. There is subtle cortical  irregularity and probable erosive change along the distal phalanx/tuft  of the great toe which does raise concern for osteomyelitis.     Admit to inpatient  Obtain MRI  Podiatry consultation  Vascular surgery consultation  Continue Zosyn  hold warfarin, INR 4.42 and plavix in anticipation for possible surgical intervention and allow INR to drift down.  Consider IV heparin when INR < 2       ## Chronic paroxysmal afib s/p atrial  ablation, 6/2023  ## HFrEF 2/2 ICM  ## HLD  ## HTN  ## Hx of CAD s/p ALESIA placement (last 2021)  PTA now warfarin and Plavix instead of apixaban as above, as well as Entresto, Coreg, torsemide and statin.  Most recent EF last month on 6/4 with severely decrease LV function with est EF 20-25%.    Noted blood pressure soft 90/70s  Decrease Coreg from 12.5 mg BID to 6.25 mg BID  Hold Torsemide  Continue Entresto and statin       ## ESRD on PD 2/2 DM nephropathy  ## Secondary hyperparathyroidism  ## Hx of RCC s/p R nephrectomy, 5/2022  PTA on nightly PD of which he has been tolerating. Access RLQ double cuffed coiled PD catheter placed 4/16/2021.   - Nephrology consulted    - Continue RRT medications below  - Continue calcitriol, cinacalcet and sevelamer carbonate  - Continue every other day gentamicin cream to PD cath side    ## DM type II  ## Diabetic neuropathy  Mod cho diet  Medium intensity sliding scale insulin  Last A1c 5.9 7/9/24       # Other chronic, stable medical conditions:  # Hx of gout: Continue PTA allopurinol  # TALI: Intolerant to nasal CPAP   # GERD: Continue daily PPI  # Insomnia: Continue PTA melatonin.        Diet: Moderate Consistent Carb (60 g CHO per Meal) Diet    DVT Prophylaxis: Warfarin  Rosario Catheter: Not present  Lines: None     Cardiac Monitoring: None  Code Status: Full Code      Clinically Significant Risk Factors Present on Admission                # Drug Induced Coagulation Defect: home medication list includes an anticoagulant medication  # Drug Induced Platelet Defect: home medication list includes an antiplatelet medication   # Hypertension: Noted on problem list                    Disposition Plan     Medically Ready for Discharge: Anticipated in 2-4 Days           Chai España MD  Hospitalist Service  [facility]  Securely message with [employer] (more info)  Text page via [employer] Paging/Directory      ______________________________________________________________________    Chief Complaint   Toe and leg infection    History is obtained from the patient, electronic health record, and emergency department physician    History of Present Illness   Arnulfo Pritchett is a 63 year old male with ESRD on peritoneal dialysis, CAD, A fib, PAD who   Mr. Arnulfo Pritchett is a 64 yo male with Afib on warfarin, GERD, gout, insomnia, ESRD on PD, HFrEF, HTN, IDDM, diabetic peripheral neuropathy, CAD s/p stent placement x 3 (last 2021), TALI, HLD, and RLE PAD s/p balloon angio and stenting of SFA/popliteal arteries presented to [facility] ED with worsening pain and redness in left toe with streaking up the leg  for the last 2 days with throbbing pain.  He was not soaking his toe as instructed, he denies any fevers.  He was seen at [facility] ER and started on Zosyn and for further vascular work up in setting of recent ABIs with no flow.  Patient had an acute right lower extremity arterial occlusion and underwent SFA popliteal balloon angioplasty and stenting emergently on 5/6/2024. He has been managed postprocedure on Plavix and Coumadin.  INR is therapeutic and his BG have been stable and not elevated.     Patient was hospitalized in July 2024 for concerns of a diabetic left great toe infection. He was seen by podiatry, infectious disease, managed on IV Vanco and Zosyn and discharged on Augmentin. He has been managed by podiatry and most recently was seen on 10/7/2024 by Dr. Siddiqui.     Prior to transfer he was noted to have an elevated CRP of 55, , WBC 7.5, Hgb 12.5 and .   He had an XRay of left foot on 10/7 which showed soft tissue swelling of great toe with subtle cortical irregularities and erosive changes along distal phalanx of the great toe raising concern for osteomyelitis.            Past Medical History    Past Medical History:   Diagnosis Date    CAD (coronary artery disease)     Chronic systolic heart  failure (H)     ESRD (end stage renal disease) on dialysis (H)     Gastroesophageal reflux disease without esophagitis     Gout     HLD (hyperlipidemia)     TALI (obstructive sleep apnea)     PAD (peripheral artery disease) (H)     S/p RLE stenting of SFA/popliteal arteries    Type 2 diabetes mellitus with diabetic neuropathy (H)        Past Surgical History   No past surgical history on file.    Prior to Admission Medications   Prior to Admission Medications   Prescriptions Last Dose Informant Patient Reported? Taking?   B Complex-C-Folic Acid (DIALYVITE) TABS Past Week Self Yes Yes   Sig: Take 1 tablet by mouth daily.   SEVELAMER CARBONATE PO 10/14/2024 Self Yes Yes   Sig: Take 800 mg by mouth 3 times daily (with meals)   acetaminophen (TYLENOL) 500 MG tablet 10/15/2024 at am Self Yes Yes   Sig: Take 1,000 mg by mouth 2 times daily.   acetaminophen-codeine (TYLENOL #3) 300-30 MG per tablet 10/14/2024 at PRN  Yes Yes   Sig: Take 1 tablet by mouth every 6 hours as needed for severe pain.   allopurinol (ZYLOPRIM) 100 MG tablet 10/14/2024 at pm Self Yes Yes   Sig: Take 200 mg by mouth every evening   atorvastatin (LIPITOR) 40 MG tablet 10/14/2024 at pm Self Yes Yes   Sig: Take 40 mg by mouth at bedtime.   calcitRIOL (ROCALTROL) 0.25 MCG capsule 10/14/2024 at pm Self Yes Yes   Sig: Take 0.25 mcg by mouth at bedtime.   carvedilol (COREG) 25 MG tablet 10/14/2024 at pm Self Yes Yes   Sig: Take 12.5 mg by mouth at bedtime.   cephALEXin (KEFLEX) 250 MG capsule 10/15/2024 at am Self No Yes   Sig: Take 1 capsule (250 mg) by mouth 2 times daily for 10 days.   cinacalcet (SENSIPAR) 60 MG tablet 10/14/2024 Self Yes Yes   Sig: Take 60 mg by mouth. Take 1 tablet (60 mg) three times a week: Monday, Wednesday, and Friday nights   clopidogrel (PLAVIX) 75 MG tablet 10/14/2024 at pm  Yes Yes   Sig: Take 75 mg by mouth every evening.   gentamicin (GARAMYCIN) 0.1 % external cream  Self Yes No   Sig: Apply topically daily. Apply topically  on peritoneal access site to prevent infections   insulin glargine (LANTUS PEN) 100 UNIT/ML pen 10/14/2024 at pm Self Yes Yes   Sig: Inject 39 Units subcutaneously at bedtime.   melatonin 5 MG tablet 10/14/2024 at pm Self Yes Yes   Sig: Take 5 mg by mouth at bedtime   omeprazole (PRILOSEC) 40 MG DR capsule 10/14/2024 Self Yes Yes   Sig: Take 40 mg by mouth daily.   sacubitril-valsartan (ENTRESTO) 49-51 MG per tablet 10/15/2024 at am Self Yes Yes   Sig: Take 1 tablet by mouth 2 times daily   torsemide (DEMADEX) 100 MG tablet 10/15/2024 at am Self Yes Yes   Sig: Take 100 mg by mouth every morning   warfarin ANTICOAGULANT (COUMADIN) 5 MG tablet 10/14/2024 at pm Self Yes Yes   Sig: Take by mouth daily. Take 5 mg MWF nights & 1.5 tablet ROW      Facility-Administered Medications: None           Physical Exam   Vital Signs: Temp: 97.6  F (36.4  C) Temp src: Oral BP: 97/70 Pulse: 90   Resp: 18 SpO2: 97 % O2 Device: None (Room air)    Weight: 170 lbs 3.12 oz    General Appearance: NAD, not spetic, AxOx3  Respiratory: CTA  Cardiovascular: RRR  GI: Soft, PD catheter site noted, non tender  Skin: warm and dry  Other: Left toe tip is blackened, possible dry gangrene, streaky redness going up dorsum and lower leg.       Medical Decision Making       70 MINUTES SPENT BY ME on the date of service doing chart review, history, exam, documentation & further activities per the note.      Data     I have personally reviewed the following data over the past 24 hrs:    7.5  \   12.5 (L)   / 299     N/A N/A N/A /  132 (H)   N/A N/A N/A \     Procal: N/A CRP: 55.81 (H) Lactic Acid: N/A         Imaging results reviewed over the past 24 hrs:   No results found for this or any previous visit (from the past 24 hour(s)).

## 2024-10-16 NOTE — PLAN OF CARE
Orientation: A&Ox4    Vitals/Tele: VSS RA    IV Access/drains: R PIV SL, intermittent abx; PD port abdomen site covered with gauze, dressing CDI.     Diet: MCHO    Mobility: SBA w/cane    GI/: Continent B&B    Wound/Skin: LLE foot/great toe black with red streaks. Erythema. Dry/flaky BLE feet/heels. Scattered bruising.     Consults: Podiatry, vascular, nephrology    Discharge Plan: Pending progress.       See Flow sheets for assessment

## 2024-10-16 NOTE — CONSULTS
VASCULAR SURGERY INPATIENT CONSULTATION / Initial In-Patient visit    VASCULAR SURGEON: Dr. Hein    LOCATION: [facility]    Arnulfo Pritchett  Medical Record #:  [id number]  YOB: 1960  Age:  63 year old     Date of Service: 10/15/2024    PRIMARY CARE PROVIDER: Chai Griffin    Reason for consultation:  LLE CLTI    Arnulfo Pritchett is a 63 year old male who presented to the hospital with LLE pad with monophasic waveforms per ABIs past knee, with approximately 2 weeks of worsening L great toe wound and erythema streaking from toe to ankle on the left. Reports pain in the left foot that started 3 weeks ago. On examination has monophasic signals of the DP and PT with noticeable erythema. 2+ bilateral femoral pulses. Previous angio done in May of this year was tolerated without incident.      RECOMMENDATION:  INR currently 4.42, holding warfarin, consider IV heparin when INR is less than 2. Holding plavix. Will obtain LLE arterial duplex with probable LLE angiogram based off examination, likely will be in the coming days. Continue statin, of note EF on 6/4 is 20-25%, patient's BPs are soft with SBP in the 90s(this is baseline per Arnulfo). Vascular Surgery will continue to follow.     HPI:  Arnulfo Pritchett is a 63 year old male who was seen today in consultation for left lower extremity critical limb ischemia. Hx of atrial fibrillation in which he is on warfarin, GERD, gout, insomnia, ESRD on PD, HFrEF, hypertension, DM, diabetic peripheral neuropath, CAD s/p stent placement who underwent PAD s/p balloon angio and stenting of the R SFA popliteal balloon angioplasty and stenting on 5/6/2024, now with approximately 2 weeks history of erythema and streaking starting at L great toe and extending to ankle with worsening L great toe wound. CRP and ESR are elevated. Previous ABIs show noncompressible vessels on the left with no toe pressures, previously seen to have monophasic waveforms past the knee.      PHH:    Past Medical History:   Diagnosis Date    CAD (coronary artery disease)     Chronic systolic heart failure (H)     ESRD (end stage renal disease) on dialysis (H)     Gastroesophageal reflux disease without esophagitis     Gout     HLD (hyperlipidemia)     TALI (obstructive sleep apnea)     PAD (peripheral artery disease) (H)     S/p RLE stenting of SFA/popliteal arteries    Type 2 diabetes mellitus with diabetic neuropathy (H)        No past surgical history on file.     ALLERGIES:   No Known Allergies     MEDS:    Current Facility-Administered Medications:     acetaminophen (TYLENOL) tablet 1,000 mg, 1,000 mg, Oral, BID, Chai España MD, 1,000 mg at 10/15/24 2025    acetaminophen-codeine (TYLENOL #3) 300-30 MG per tablet 1 tablet, 1 tablet, Oral, Q6H PRN, Chai España MD, 1 tablet at 10/16/24 0151    allopurinol (ZYLOPRIM) tablet 200 mg, 200 mg, Oral, QPM, Chai España MD, 200 mg at 10/15/24 2026    atorvastatin (LIPITOR) tablet 40 mg, 40 mg, Oral, At Bedtime, Chai España MD, 40 mg at 10/15/24 2025    calcitRIOL (ROCALTROL) capsule 0.25 mcg, 0.25 mcg, Oral, At Bedtime, Chai España MD, 0.25 mcg at 10/15/24 2025    calcium carbonate (TUMS) chewable tablet 1,000 mg, 1,000 mg, Oral, 4x Daily PRN, Chai España MD    carvedilol (COREG) tablet 6.25 mg, 6.25 mg, Oral, At Bedtime, Chai España MD    cinacalcet (SENSIPAR) tablet 60 mg, 60 mg, Oral, Once per day on Monday Wednesday Friday, Chai España MD    glucose gel 15-30 g, 15-30 g, Oral, Q15 Min PRN **OR** dextrose 50 % injection 25-50 mL, 25-50 mL, Intravenous, Q15 Min PRN **OR** glucagon injection 1 mg, 1 mg, Subcutaneous, Q15 Min PRN, Chai España MD    insulin aspart (NovoLOG) injection (RAPID ACTING), 1-7 Units, Subcutaneous, TID AC, Chai España MD    insulin aspart (NovoLOG) injection (RAPID ACTING), 1-5 Units, Subcutaneous, At Bedtime, Chai España MD    lidocaine (LMX4)  cream, , Topical, Q1H PRN, Chai España MD    lidocaine 1 % 0.1-1 mL, 0.1-1 mL, Other, Q1H PRN, Chai España MD    naloxone (NARCAN) injection 0.2 mg, 0.2 mg, Intravenous, Q2 Min PRN **OR** naloxone (NARCAN) injection 0.4 mg, 0.4 mg, Intravenous, Q2 Min PRN **OR** naloxone (NARCAN) injection 0.2 mg, 0.2 mg, Intramuscular, Q2 Min PRN **OR** naloxone (NARCAN) injection 0.4 mg, 0.4 mg, Intramuscular, Q2 Min PRN, Chai España MD    ondansetron (ZOFRAN ODT) ODT tab 4 mg, 4 mg, Oral, Q6H PRN **OR** ondansetron (ZOFRAN) injection 4 mg, 4 mg, Intravenous, Q6H PRN, Chai España MD, 4 mg at 10/15/24 2119    pantoprazole (PROTONIX) EC tablet 40 mg, 40 mg, Oral, BID, Chai España MD, 40 mg at 10/15/24 2025    Patient is already receiving anticoagulation with heparin, enoxaparin (LOVENOX), warfarin (COUMADIN)  or other anticoagulant medication, , Does not apply, Continuous PRN, Chai España MD    piperacillin-tazobactam (ZOSYN) 2.25 g vial to attach to  ml bag, 2.25 g, Intravenous, Q8H, Chai España MD, 2.25 g at 10/16/24 0508    sacubitril-valsartan (ENTRESTO) 49-51 MG per tablet 1 tablet, 1 tablet, Oral, BID, Chai España MD, 1 tablet at 10/15/24 2025    senna-docusate (SENOKOT-S/PERICOLACE) 8.6-50 MG per tablet 1 tablet, 1 tablet, Oral, BID PRN **OR** senna-docusate (SENOKOT-S/PERICOLACE) 8.6-50 MG per tablet 2 tablet, 2 tablet, Oral, BID PRN, Chai España MD    sevelamer carbonate (RENVELA) tablet 800 mg, 800 mg, Oral, TID w/meals, Chai España MD    sodium chloride (PF) 0.9% PF flush 3 mL, 3 mL, Intracatheter, Q8H, Chai España MD, 3 mL at 10/15/24 2035    sodium chloride (PF) 0.9% PF flush 3 mL, 3 mL, Intracatheter, q1 min prn, Chai España MD    [Held by provider] torsemide (DEMADEX) tablet 100 mg, 100 mg, Oral, QAM, Chai España MD    [Held by provider] Warfarin Dose Required Daily - Pharmacist Managed, 1 each, Oral, See  "Admin Instructions, Chai España MD     SOCIAL HABITS:    Tobacco Use      Smoking status: Former        Types: Cigarettes      Smokeless tobacco: Never     Social History    Substance and Sexual Activity      Alcohol use: Not Currently        Comment: quit 20 years       History   Drug Use Not on file        FAMILY HISTORY:  No family history on file.    REVIEW OF SYSTEMS:    A 12 point ROS was reviewed and except for what is listed in the HPI above, all others are negative    PE:    Vital signs:  Temp: 98.5  F (36.9  C) Temp src: Oral BP: 108/72 Pulse: 91   Resp: 18 SpO2: 98 % O2 Device: None (Room air)     Weight: 76.7 kg (169 lb 1.5 oz)  Estimated body mass index is 23.16 kg/m  as calculated from the following:    Height as of 10/8/24: 1.82 m (5' 11.65\").    Weight as of this encounter: 76.7 kg (169 lb 1.5 oz).       Wt Readings from Last 1 Encounters:   10/16/24 76.7 kg (169 lb 1.5 oz)     Body mass index is 23.16 kg/m .      DIAGNOSTIC STUDIES:     Images:  XR Foot Left G/E 3 Views    Result Date: 10/7/2024  LEFT FOOT THREE OR MORE VIEWS  10/7/2024 2:05 PM HISTORY: Left great toe wound. Ulcer of left great toe due to diabetes mellitus (H). COMPARISON: None.     IMPRESSION:  Soft tissue swelling great toe. There is subtle cortical irregularity and probable erosive change along the distal phalanx/tuft of the great toe which does raise concern for osteomyelitis. MRI could be performed for further evaluation as needed. Extensive atherosclerotic vascular calcifications. Mild scattered arthritic changes. No evidence of an acute fracture. MYRIAM DIAZ MD   SYSTEM ID:  [id number]    LABS:      Sodium   Date Value Ref Range Status   10/16/2024 133 (L) 135 - 145 mmol/L Final   07/11/2024 133 (L) 135 - 145 mmol/L Final   07/10/2024 135 135 - 145 mmol/L Final     Urea Nitrogen   Date Value Ref Range Status   10/16/2024 63.9 (H) 8.0 - 23.0 mg/dL Final   07/11/2024 44.8 (H) 8.0 - 23.0 mg/dL Final   07/10/2024 " 43.5 (H) 8.0 - 23.0 mg/dL Final     Hemoglobin   Date Value Ref Range Status   10/16/2024 10.9 (L) 13.3 - 17.7 g/dL Final   10/15/2024 12.5 (L) 13.3 - 17.7 g/dL Final   07/11/2024 11.3 (L) 13.3 - 17.7 g/dL Final     Platelet Count   Date Value Ref Range Status   10/16/2024 271 150 - 450 10e3/uL Final   10/15/2024 299 150 - 450 10e3/uL Final   07/11/2024 224 150 - 450 10e3/uL Final     INR   Date Value Ref Range Status   10/16/2024 4.41 (H) 0.85 - 1.15 Final   10/15/2024 4.42 (H) 0.85 - 1.15 Final   10/11/2024 2.2 (H) 0.9 - 1.1 Final   10/08/2024 4.8 (H) 0.9 - 1.1 Final   07/11/2024 2.25 (H) 0.85 - 1.15 Final       35 min spent on the date of the encounter in chart review, patient visit, review of tests, documentation and/or discussion with other providers about the issues documented above     Cecilia Stearns NP  [facility] Vascular Surgery    STAFF: Patient examined.  Reports he had a very small sore on the tip of his toe and had several interventions and now has necrosis of the distal right great toe along with ischemic changes to his forefoot.  Does have a history of PAD.    In May he had acute issues with his right leg and underwent a right leg angiogram at [facility] on 5/8/2024 with several SFA stents were placed.  Patient reports that left leg was also evaluated and there was minimal if any disease though this is not documented in any of the records.  On 7/3/2024 treating hospital performed a right leg arterial duplex that showed that the stents were patent.    By duplex he does have significant disease with extensive calcification throughout the left leg.  High likelihood of SFA high-grade stenoses.    Dr. Esquivel of podiatry is examined the patient.  MRI has been ordered results are still pending to rule out osteomyelitis.  The minimum required eventually right great toe amputation but we need to deal with his vascular problem initially.    Does have a history of cardiomyopathy with 6/4/2024 EF=  20 to 25%.  Patient is on chronic peritoneal dialysis.  Missed his dialysis yesterday and will have this done today.    Patient will require an aortogram with left leg runoff at some time to evaluate this further.    Over 45 minutes with patient including chart review from outside hospitals.  Interventional radiology is aware of patient and need for angiogram.         Patrick Hein MD

## 2024-10-16 NOTE — CONSULTS
RENAL CONSULTATION NOTE    REFERRING MD:  Chai España MD     REASON FOR CONSULTATION:  ESRD on PD    HPI:  63 y.o man with ESRD on PD, who was admitted for ischemic limb.     He came to the ER with worsening pain and redness of the left toe.   Vascular medicine evaluated the patient and plan for LLE angiogram.   He is on Zosyn.    PD prescripton:  9 hrs  4 cylces  8108-3913 ml per cycle  Last fill of Extraneal of 1200 ml-He drains this around noon.     ROS:  A complete review of systems was performed and is negative except as noted above.    PMH:    Past Medical History:   Diagnosis Date    CAD (coronary artery disease)     Chronic systolic heart failure (H)     ESRD (end stage renal disease) on dialysis (H)     Gastroesophageal reflux disease without esophagitis     Gout     HLD (hyperlipidemia)     TALI (obstructive sleep apnea)     PAD (peripheral artery disease) (H)     S/p RLE stenting of SFA/popliteal arteries    Type 2 diabetes mellitus with diabetic neuropathy (H)        PSH:  No past surgical history on file.    MEDICATIONS:    Current Facility-Administered Medications   Medication Dose Route Frequency Provider Last Rate Last Admin    acetaminophen (TYLENOL) tablet 1,000 mg  1,000 mg Oral BID Chai España MD   1,000 mg at 10/16/24 0923    allopurinol (ZYLOPRIM) tablet 200 mg  200 mg Oral QPM Chai España MD   200 mg at 10/15/24 2026    atorvastatin (LIPITOR) tablet 40 mg  40 mg Oral At Bedtime Chai España MD   40 mg at 10/15/24 2025    calcitRIOL (ROCALTROL) capsule 0.25 mcg  0.25 mcg Oral At Bedtime Chai España MD   0.25 mcg at 10/15/24 2025    carvedilol (COREG) tablet 6.25 mg  6.25 mg Oral At Bedtime Chai España MD        cinacalcet (SENSIPAR) tablet 60 mg  60 mg Oral Once per day on Monday Wednesday Friday Chai España MD   60 mg at 10/16/24 0923    insulin aspart (NovoLOG) injection (RAPID ACTING)  1-7 Units Subcutaneous TID AC Chai España MD         insulin aspart (NovoLOG) injection (RAPID ACTING)  1-5 Units Subcutaneous At Bedtime Chai España MD        pantoprazole (PROTONIX) EC tablet 40 mg  40 mg Oral BID Chai España MD   40 mg at 10/16/24 0923    piperacillin-tazobactam (ZOSYN) 2.25 g vial to attach to  ml bag  2.25 g Intravenous Q8H Chai España MD   2.25 g at 10/16/24 0508    sacubitril-valsartan (ENTRESTO) 49-51 MG per tablet 1 tablet  1 tablet Oral BID Chai España MD   1 tablet at 10/16/24 0923    sevelamer carbonate (RENVELA) tablet 800 mg  800 mg Oral TID w/meals Chai España MD   800 mg at 10/16/24 0923    sodium chloride (PF) 0.9% PF flush 3 mL  3 mL Intracatheter Q8H Chai España MD   3 mL at 10/15/24 2035    [Held by provider] torsemide (DEMADEX) tablet 100 mg  100 mg Oral QAM Chai España MD        [Held by provider] Warfarin Dose Required Daily - Pharmacist Managed  1 each Oral See Admin Instructions Chai España MD        warfarin-No DOSE today  1 each Does not apply no dose today (warfarin) Chai España MD           ALLERGIES:    Allergies as of 10/15/2024    (No Known Allergies)       FH:  No family history on file.    SH:    Social History     Socioeconomic History    Marital status:      Spouse name: Not on file    Number of children: Not on file    Years of education: Not on file    Highest education level: Not on file   Occupational History    Not on file   Tobacco Use    Smoking status: Former     Types: Cigarettes    Smokeless tobacco: Never   Substance and Sexual Activity    Alcohol use: Not Currently     Comment: quit 20 years    Drug use: Not on file    Sexual activity: Not on file   Other Topics Concern    Not on file   Social History Narrative    Not on file     Social Determinants of Health     Financial Resource Strain: Low Risk  (10/15/2024)    Financial Resource Strain     Within the past 12 months, have you or your family members you live  with been unable to get utilities (heat, electricity) when it was really needed?: No   Food Insecurity: Low Risk  (10/15/2024)    Food Insecurity     Within the past 12 months, did you worry that your food would run out before you got money to buy more?: No     Within the past 12 months, did the food you bought just not last and you didn t have money to get more?: No   Transportation Needs: Low Risk  (10/15/2024)    Transportation Needs     Within the past 12 months, has lack of transportation kept you from medical appointments, getting your medicines, non-medical meetings or appointments, work, or from getting things that you need?: No   Physical Activity: Unknown (10/8/2024)    Exercise Vital Sign     Days of Exercise per Week: 0 days     Minutes of Exercise per Session: Not on file   Stress: No Stress Concern Present (10/8/2024)    [employer] [place] [employer] - Occupational Stress Questionnaire     Feeling of Stress : Only a little   Social Connections: Unknown (10/8/2024)    Social Connection and Isolation Panel [NHANES]     Frequency of Communication with Friends and Family: Not on file     Frequency of Social Gatherings with Friends and Family: Patient declined     Attends Sikhism Services: Not on file     Active Member of Clubs or Organizations: Not on file     Attends Club or Organization Meetings: Not on file     Marital Status: Not on file   Interpersonal Safety: Low Risk  (10/15/2024)    Interpersonal Safety     Do you feel physically and emotionally safe where you currently live?: Yes     Within the past 12 months, have you been hit, slapped, kicked or otherwise physically hurt by someone?: No     Within the past 12 months, have you been humiliated or emotionally abused in other ways by your partner or ex-partner?: No   Housing Stability: Low Risk  (10/15/2024)    Housing Stability     Do you have housing? : Yes     Are you worried about losing your housing?: No       PHYSICAL EXAM:    BP  108/72 (BP Location: Left arm)   Pulse 91   Temp 98.5  F (36.9  C) (Oral)   Resp 18   Wt 76.7 kg (169 lb 1.5 oz)   SpO2 98%   BMI 23.16 kg/m    GENERAL: pleasant, alert, NAD  HEENT:  Normocephalic. No gross abnormalities.  Pupils equal.  MMM.    CV: RRR,   RESP: Clear bilaterally with good efforts  GI: Abdomen Soft. NT. No abdominal pain.   MUSCULOSKELETAL: extremities no edema  SKIN: no suspicious lesions or rashes, dry to touch.   NEURO:  Strength normal and symmetric.   PSYCH: mood good, affect appropriate  LYMPH: No palpable ant/post cervical     LABS:      CBC RESULTS:     Recent Labs   Lab 10/16/24  0642 10/15/24  1317   WBC 5.3 7.5   RBC 3.29* 3.63*   HGB 10.9* 12.5*   HCT 33.4* 35.8*    299       BMP RESULTS:  Recent Labs   Lab 10/16/24  0743 10/16/24  0642 10/16/24  0203 10/15/24  2156 10/15/24  2052 10/15/24  1233   NA  --  133*  --   --   --   --    POTASSIUM  --  6.0*  --   --   --   --    CHLORIDE  --  96*  --   --   --   --    CO2  --  24  --   --   --   --    BUN  --  63.9*  --   --   --   --    CR  --  10.34*  --   --  9.84*  --    GLC 94 96 124* 171*  --  132*   TERENCE  --  9.1  --   --   --   --        INR  Recent Labs   Lab 10/16/24  0642 10/15/24  2020 10/11/24  1452   INR 4.41* 4.42* 2.2*        DIAGNOSTICS:  Reviewed    A/P:  63 y.o man with ESRD and on PD    # ESRD: PD prescription as above   -[phone]  # Severe PAD with left great toe wound  # Hyperkalemia  # Anemia  # CAD, EF of 20-25%: Seems like he is well compensated  # CKD-MBD:   -Sensipar   -calcitriol    Plan;  # Lokelma  # Insulin/Dextrose and calcium gluconate  # PD order placed. RN to connect him as soon as possible  # will do 5 cycles with 2 liters fill as we do not have Extraneal in the hospital for last fill  # I discussed the plan with the dialysis RN    Star Acuna MD  [facility] - Nephrology  Office Phone: [phone]  Pager: [phone]

## 2024-10-16 NOTE — PHARMACY-ADMISSION MEDICATION HISTORY
Pharmacy Intern Admission Medication History    Admission medication history is complete. The information provided in this note is only as accurate as the sources available at the time of the update.    Information Source(s): Patient and CareEverywhere/SureScripts via in-person    Pertinent Information:   - carvedilol prescribed as 50 mg twice daily but patient states he only take half a tablet (12.5 mg) at bedtime. He states if he will experience low blood pressure side effects if he takes morning doses so he does not.   - hydralazine last filled 8/20 for 90 days supply but patient states no longer taking due to low blood pressure  - sevelamer prescribed as three times daily but patient states only taking twice daily  - no sure scripts for Entresto but patient states he gets his supply through the      Changes made to PTA medication list:  Added: None  Deleted: None  Changed:  acetamin PRN--> twice daily  Carvedilol as states above    Allergies reviewed with patient and updates made in EHR: yes    Medication History Completed By: Samra Dickerson [facility] 10/15/2024 7:34 PM    PTA Med List   Medication Sig Note Last Dose    acetaminophen (TYLENOL) 500 MG tablet Take 1,000 mg by mouth 2 times daily.  10/15/2024 at am    acetaminophen-codeine (TYLENOL #3) 300-30 MG per tablet Take 1 tablet by mouth every 6 hours as needed for severe pain.  10/14/2024 at PRN    allopurinol (ZYLOPRIM) 100 MG tablet Take 200 mg by mouth every evening  10/14/2024 at pm    atorvastatin (LIPITOR) 40 MG tablet Take 40 mg by mouth at bedtime.  10/14/2024 at pm    B Complex-C-Folic Acid (DIALYVITE) TABS Take 1 tablet by mouth daily.  Past Week    calcitRIOL (ROCALTROL) 0.25 MCG capsule Take 0.25 mcg by mouth at bedtime.  10/14/2024 at pm    carvedilol (COREG) 25 MG tablet Take 12.5 mg by mouth at bedtime. 10/15/2024: Prescribed as 50 mg twice daily but patient states he only takes 12.5 mg at bedtime because he usually experiences low blood  pressureside effects with morning doses 10/14/2024 at pm    cephALEXin (KEFLEX) 250 MG capsule Take 1 capsule (250 mg) by mouth 2 times daily for 10 days.  10/15/2024 at am    cinacalcet (SENSIPAR) 60 MG tablet Take 60 mg by mouth. Take 1 tablet (60 mg) three times a week: Monday, Wednesday, and Friday nights  10/14/2024    clopidogrel (PLAVIX) 75 MG tablet Take 75 mg by mouth every evening.  10/14/2024 at pm    insulin glargine (LANTUS PEN) 100 UNIT/ML pen Inject 39 Units subcutaneously at bedtime.  10/14/2024 at pm    melatonin 5 MG tablet Take 5 mg by mouth at bedtime  10/14/2024 at pm    omeprazole (PRILOSEC) 40 MG DR capsule Take 40 mg by mouth daily.  10/14/2024    sacubitril-valsartan (ENTRESTO) 49-51 MG per tablet Take 1 tablet by mouth 2 times daily  10/15/2024 at am    SEVELAMER CARBONATE PO Take 800 mg by mouth 3 times daily (with meals)  10/14/2024    torsemide (DEMADEX) 100 MG tablet Take 100 mg by mouth every morning  10/15/2024 at am    warfarin ANTICOAGULANT (COUMADIN) 5 MG tablet Take by mouth daily. Take 5 mg MWF nights & 1.5 tablet ROW  10/14/2024 at pm

## 2024-10-16 NOTE — PROGRESS NOTES
Cycler set up per protocol and per treatment orders     Results from previous treatment: New set up    Cycler Serial Number: [id number]  Total Treatment Volume: 10,000 mL  Total treatment time: 9 hrs  Fill Volume: 2000 ml  Last Fill Volume:0 ml  Heater ba.5% 6000mL;  Lot #: [id number];  Expiration Date:   Side Bag #1: 2.5% 6000mL;  Lot #: [id number];  Expiration Date:     Number of cycles including final fill: 5  Dwell Time: 1:22 minutes  Last Fill Volume: 0ml  Initial Drain Alarm: 0ml  PD orders reviewed with Patient procedure and ESRD teaching done and questions answered.  Cycler ready for hook-up.    Report given to: ^th floor charge nurse  Fredy consent form signed YES

## 2024-10-16 NOTE — PLAN OF CARE
Goal Outcome Evaluation:  Summary: Possible osteomyelitis of LLE toe, ESRD on PD  DATE & TIME: 10/16/24 0864-1739    Cognitive Concerns/ Orientation : A&Ox4   BEHAVIOR & AGGRESSION TOOL COLOR: Green  CIWA SCORE: NA   ABNL VS/O2: VSS on RA  MOBILITY: Independent  PAIN MANAGMENT: C/o of LLE toe pain - gave PRN tylenol #3 x2  DIET: 2 g K diet. NPO at midnight for LLE angiogram  BOWEL/BLADDER: Continent  ABNL LAB/BG: K 6.0, INR 4.41  DRAIN/DEVICES: LLQ PD catheter covered w/ gauze - CDI, R PIV SL  TELEMETRY RHYTHM:   SKIN: LLE toe black, red/luis extending up foot. Dry/flaky heels/feet. Scattered bruising.  TESTS/PROCEDURES: US, MRI completed. LLE angiogram w/ possible angioplasty scheduled for tomorrow.  D/C DATE: Pending work up, to home.  OTHER IMPORTANT INFO: Podiatry, vascular, nephrology, orthotics. PD running - plan to stop around 0330.

## 2024-10-16 NOTE — PROGRESS NOTES
[facility]    Medicine Progress Note - Hospitalist Service    Date of Admission:  10/15/2024  Date of Service: 10/16/2024    Assessment & Plan     Arnulfo Pritchett is a 63 year old male with ESRD on peritoneal dialysis, CAD, A fib, PAD who   Mr. Arnulfo Pritchett is a 62 yo male with Afib on warfarin, GERD, gout, insomnia, ESRD on PD, HFrEF, HTN, IDDM, diabetic peripheral neuropathy, CAD s/p stent placement x 3 (last 2021), TALI, HLD, and RLE PAD s/p balloon angio and stenting of SFA/popliteal arteries presented to [facility] ED with worsening pain and redness in left toe with streaking up the leg  for the last 2 days with throbbing pain.  He was not soaking his toe as instructed, he denies any fevers.  He was seen at [facility] ER and started on Zosyn and for further vascular work up in setting of recent ABIs with no flow.  Patient had an acute right lower extremity arterial occlusion and underwent SFA popliteal balloon angioplasty and stenting emergently on 5/6/2024. He has been managed postprocedure on Plavix and Coumadin.  INR is therapeutic and his BG have been stable and not elevated.      ## Worsening L great toe wound    ## Hx of acute RLE arterial occlusion s/p SFA popliteal balloon angioplasty and stenting, 5/6/2024  Presented to OSH with 2 day history of erythema and streaking redness going up his leg with associated increase pain and throbbing noted.  He has no fever but both CRP 55 and ESR 86 are elevated. XR of left foot show:   Soft tissue swelling great toe. There is subtle cortical  irregularity and probable erosive change along the distal phalanx/tuft  of the great toe which does raise concern for osteomyelitis.     Plan:   Obtain MRI -> pending  Podiatry consultation -> will need at least a partial left hallux amputation, once/if vascular status is optimized.   Vascular surgery consultation -> obtain LLE arterial duplex with probable LLE angiogram based off examination, likely  will be in the coming days.   Continue Zosyn  hold warfarin, INR 4.42 and plavix in anticipation for possible surgical intervention and allow INR to drift down.  Consider IV heparin when INR < 2       ## Chronic paroxysmal afib s/p atrial ablation, 6/2023  ## HFrEF 2/2 ICM  ## HLD  ## HTN  ## Hx of CAD s/p ALESIA placement (last 2021)  PTA now warfarin and Plavix instead of apixaban as above, as well as Entresto, Coreg, torsemide and statin.  Most recent EF last month on 6/4 with severely decrease LV function with est EF 20-25%.    Noted blood pressure soft 90/70s  Decrease Coreg from 12.5 mg BID to 6.25 mg BID  Hold Torsemide  Continue Entresto and statin       ## ESRD on PD 2/2 DM nephropathy  ## Secondary hyperparathyroidism  ## Hx of RCC s/p R nephrectomy, 5/2022  ## Hyperkalemia  PTA on nightly PD of which he has been tolerating. Access RLQ double cuffed coiled PD catheter placed 4/16/2021.   - Nephrology consulted for PD planning  - Continue RRT medications below  - Continue calcitriol, cinacalcet and sevelamer carbonate  - Continue every other day gentamicin cream to PD cath side  - Telemetry for Hyperkalemia 5.7  - Lokelma for hyperK    ## DM type II  ## Diabetic neuropathy  Mod cho diet  Medium intensity sliding scale insulin  Last A1c 5.9 7/9/24       # Other chronic, stable medical conditions:  # Hx of gout: Continue PTA allopurinol  # TALI: Intolerant to nasal CPAP   # GERD: Continue daily PPI  # Insomnia: Continue PTA melatonin.          Diet: 2 Gram K Diet  NPO per Anesthesia Guidelines for Procedure/Surgery Except for: Meds, Ice Chips    DVT Prophylaxis: Pneumatic Compression Devices  [place] Catheter: Not present  Lines: None     Cardiac Monitoring: None  Code Status: Full Code      Clinically Significant Risk Factors Present on Admission        # Hyperkalemia: Highest K = 6 mmol/L in last 2 days, will monitor as appropriate  # Hyponatremia: Lowest Na = 132 mmol/L in last 2 days, will monitor as  appropriate  # Hypochloremia: Lowest Cl = 95 mmol/L in last 2 days, will monitor as appropriate       # Drug Induced Coagulation Defect: home medication list includes an anticoagulant medication  # Drug Induced Platelet Defect: home medication list includes an antiplatelet medication   # Hypertension: Noted on problem list    # Anemia: based on hgb <11           # Financial/Environmental Concerns: none         Disposition Plan     Medically Ready for Discharge: Anticipated in 2-4 Days             Benjie Collins MD  Hospitalist Service  [facility]  Securely message with [employer] (more info)  Text page via [employer] Paging/Directory   ______________________________________________________________________    Interval History     No fevers or chills  No CP/SOB  No nausea / vomiting   No LLE pain  No new complaints    Physical Exam   Vital Signs: Temp: 98.3  F (36.8  C) Temp src: Oral BP: 102/73 Pulse: 84   Resp: 18 SpO2: 98 % O2 Device: None (Room air)    Weight: 169 lbs 1.49 oz    General Appearance:  NAD, not spetic, AxOx3  Respiratory: CTA  Cardiovascular: RRR  GI: Soft, PD catheter site noted, non tender  Skin: warm and dry  Other:  Left toe tip is blackened, possible dry gangrene, streaky redness going up dorsum and lower leg.     ----------------------------------------------------------------------------------------    Medical Decision Making       50 MINUTES SPENT BY ME on the date of service doing chart review, history, exam, documentation & further activities per the note.      Data   ------------------------- PAST 24 HR DATA REVIEWED -----------------------------------------------    I have personally reviewed the following data over the past 24 hrs:    5.3  \   10.9 (L)   / 271     132 (L) 95 (L) 65.3 (H) /  94   5.7 (H) 24 10.72 (H) \     TSH: N/A T4: N/A A1C: 5.7 (H)     INR:  4.41 (H) PTT:  N/A   D-dimer:  N/A Fibrinogen:  N/A       Imaging results reviewed over the past 24 hrs:   Recent  Results (from the past 24 hour(s))   US Lower Extremity Arterial Duplex Left    Narrative    [facility]  DATE: 10/16/2024    EXAM: LEFT LOWER EXTREMITY ARTERIAL DUPLEX    INDICATION: Peripheral vascular disease     TECHNIQUE: Duplex imaging was performed utilizing gray-scale,  compression, augmentation as appropriate, color-flow, Doppler waveform  analysis, and spectral Doppler imaging.    COMPARISON: [id number].    FINDINGS:     Heavily calcified atherosclerotic disease obscuring visualization of  portions of the left lobe show any vascular structure. Monophasic flow  in the mid and proximal SFA and throughout the remainder of the left  lower extremity. Elevated velocity distal SFA. Significantly abnormal  popliteal and infrapopliteal waveforms.      Impression    IMPRESSION:   1.  Heavily calcified atherosclerotic disease throughout the left  lower extremity obscuring portions of the underlying vasculature.  Waveforms suggest a severe proximal superficial femoral artery  stenosis with a likely distal superficial femoral artery stenosis also  seen. Severely abnormal waveforms in the infrapopliteal vessels  suggest severe disease in these vessels as well. Consider CTA with  runoff for further evaluation.    EMERSON HURTADO MD         SYSTEM ID:  [id number]     ------------------------- ENCOUNTER LABS ----------------------------------------------------------------  Recent Labs   Lab 10/16/24  1543 10/16/24  1207 10/16/24  0743 10/16/24  0642 10/15/24  2156 10/15/24  2052 10/15/24  2020 10/15/24  1317 10/15/24  1233 10/11/24  1452   WBC  --   --   --  5.3  --   --   --  7.5  --   --    HGB  --   --   --  10.9*  --   --   --  12.5*  --   --    MCV  --   --   --  102*  --   --   --  99  --   --    PLT  --   --   --  271  --   --   --  299  --   --    INR  --   --   --  4.41*  --   --  4.42*  --   --  2.2*   *  --   --  133*  --   --   --   --   --   --    POTASSIUM 5.7*  --   --  6.0*  --   --   --   --   --   --     CHLORIDE 95*  --   --  96*  --   --   --   --   --   --    CO2 24  --   --  24  --   --   --   --   --   --    BUN 65.3*  --   --  63.9*  --   --   --   --   --   --    CR 10.72*  --   --  10.34*  --  9.84*  --   --   --   --    ANIONGAP 13  --   --  13  --   --   --   --   --   --    TERENCE 8.9  --   --  9.1  --   --   --   --   --   --    GLC 94 125* 94 96   < >  --   --   --    < >  --     < > = values in this interval not displayed.       Most Recent 3 CBC's:  Recent Labs   Lab Test 10/16/24  0642 10/15/24  1317 07/11/24  0659   WBC 5.3 7.5 5.7   HGB 10.9* 12.5* 11.3*   * 99 99    299 224     Most Recent 3 BMP's:  Recent Labs   Lab Test 10/16/24  1543 10/16/24  1207 10/16/24  0743 10/16/24  0642 10/15/24  2156 10/15/24  2052 07/11/24  0943 07/11/24  0659   *  --   --  133*  --   --   --  133*   POTASSIUM 5.7*  --   --  6.0*  --   --   --  4.4   CHLORIDE 95*  --   --  96*  --   --   --  96*   CO2 24  --   --  24  --   --   --  26   BUN 65.3*  --   --  63.9*  --   --   --  44.8*   CR 10.72*  --   --  10.34*  --  9.84*  --  7.51*   ANIONGAP 13  --   --  13  --   --   --  11   TERENCE 8.9  --   --  9.1  --   --   --  7.6*   GLC 94 125* 94 96   < >  --    < > 204*    < > = values in this interval not displayed.     Most Recent 2 LFT's:No lab results found.  Most Recent 3 INR's:  Recent Labs   Lab Test 10/16/24  0642 10/15/24  2020 10/11/24  1452   INR 4.41* 4.42* 2.2*     Most Recent 3 Troponin's:No lab results found.  Most Recent 3 BNP's:No lab results found.  Most Recent D-dimer:No lab results found.  Most Recent Cholesterol Panel:No lab results found.  Most Recent 6 Bacteria Isolates From Any Culture (See EPIC Reports for Culture Details):No lab results found.  Most Recent TSH and T4:No lab results found.  Most Recent 6 glucoses:  Recent Labs   Lab Test 10/16/24  1543 10/16/24  1207 10/16/24  0743 10/16/24  0642 10/16/24  0203 10/15/24  2156   GLC 94 125* 94 96 124* 171*     Most Recent  Urinalysis:No lab results found.  Most Recent ABG:No lab results found.  Most Recent ESR & CRP:  Recent Labs   Lab Test 10/15/24  1317   SED 86*   CRPI 55.81*

## 2024-10-16 NOTE — PLAN OF CARE
Goal Outcome Evaluation:      Plan of Care Reviewed With: patient          Outcome Evaluation: Planning patient will discharge home

## 2024-10-16 NOTE — PLAN OF CARE
Orientation: A&Ox4     Vitals/Tele: VSS RA, pain 8/10 managed with prn Tyl#3.     IV Access/drains: R PIV SL, intermittent abx; PD port abdomen site covered with gauze, dressing CDI.      Diet: MCHO     Mobility: SBA w/cane     GI/: Continent B&B     Wound/Skin: LLE foot/great toe black with red streaks, marked - no change this shift. Erythema. Dry/flaky BLE feet/heels. Scattered bruising.      Consults: Podiatry, vascular, nephrology     Discharge Plan: MRI today. Discharge TBD.         See Flow sheets for assessment

## 2024-10-17 ENCOUNTER — APPOINTMENT (OUTPATIENT)
Dept: INTERVENTIONAL RADIOLOGY/VASCULAR | Facility: CLINIC | Age: 64
DRG: 252 | End: 2024-10-17
Payer: MEDICARE

## 2024-10-17 ENCOUNTER — APPOINTMENT (OUTPATIENT)
Dept: CT IMAGING | Facility: CLINIC | Age: 64
DRG: 252 | End: 2024-10-17
Attending: RADIOLOGY
Payer: MEDICARE

## 2024-10-17 LAB
ALBUMIN SERPL BCG-MCNC: 2.1 G/DL (ref 3.5–5.2)
ANION GAP SERPL CALCULATED.3IONS-SCNC: 13 MMOL/L (ref 7–15)
BUN SERPL-MCNC: 59.7 MG/DL (ref 8–23)
CALCIUM SERPL-MCNC: 8.2 MG/DL (ref 8.8–10.4)
CHLORIDE SERPL-SCNC: 96 MMOL/L (ref 98–107)
CREAT SERPL-MCNC: 9.73 MG/DL (ref 0.67–1.17)
EGFRCR SERPLBLD CKD-EPI 2021: 5 ML/MIN/1.73M2
GLUCOSE BLDC GLUCOMTR-MCNC: 123 MG/DL (ref 70–99)
GLUCOSE BLDC GLUCOMTR-MCNC: 159 MG/DL (ref 70–99)
GLUCOSE BLDC GLUCOMTR-MCNC: 170 MG/DL (ref 70–99)
GLUCOSE BLDC GLUCOMTR-MCNC: 275 MG/DL (ref 70–99)
GLUCOSE BLDC GLUCOMTR-MCNC: 96 MG/DL (ref 70–99)
GLUCOSE SERPL-MCNC: 205 MG/DL (ref 70–99)
HCO3 SERPL-SCNC: 25 MMOL/L (ref 22–29)
INR PPP: 3.74 (ref 0.85–1.15)
PHOSPHATE SERPL-MCNC: 6.8 MG/DL (ref 2.5–4.5)
POTASSIUM SERPL-SCNC: 5 MMOL/L (ref 3.4–5.3)
SODIUM SERPL-SCNC: 134 MMOL/L (ref 135–145)

## 2024-10-17 PROCEDURE — 80069 RENAL FUNCTION PANEL: CPT | Performed by: STUDENT IN AN ORGANIZED HEALTH CARE EDUCATION/TRAINING PROGRAM

## 2024-10-17 PROCEDURE — 250N000011 HC RX IP 250 OP 636: Performed by: RADIOLOGY

## 2024-10-17 PROCEDURE — 272N000567 HC SHEATH CR4

## 2024-10-17 PROCEDURE — C1769 GUIDE WIRE: HCPCS

## 2024-10-17 PROCEDURE — 99232 SBSQ HOSP IP/OBS MODERATE 35: CPT | Performed by: INTERNAL MEDICINE

## 2024-10-17 PROCEDURE — 250N000013 HC RX MED GY IP 250 OP 250 PS 637: Performed by: INTERNAL MEDICINE

## 2024-10-17 PROCEDURE — 272N000116 HC CATH CR1

## 2024-10-17 PROCEDURE — 120N000001 HC R&B MED SURG/OB

## 2024-10-17 PROCEDURE — 250N000011 HC RX IP 250 OP 636: Performed by: INTERNAL MEDICINE

## 2024-10-17 PROCEDURE — 75635 CT ANGIO ABDOMINAL ARTERIES: CPT | Mod: MG

## 2024-10-17 PROCEDURE — 999N000127 HC STATISTIC PERIPHERAL IV START W US GUIDANCE

## 2024-10-17 PROCEDURE — 250N000011 HC RX IP 250 OP 636: Performed by: NURSE PRACTITIONER

## 2024-10-17 PROCEDURE — G1010 CDSM STANSON: HCPCS

## 2024-10-17 PROCEDURE — 99232 SBSQ HOSP IP/OBS MODERATE 35: CPT | Performed by: STUDENT IN AN ORGANIZED HEALTH CARE EDUCATION/TRAINING PROGRAM

## 2024-10-17 PROCEDURE — 90945 DIALYSIS ONE EVALUATION: CPT

## 2024-10-17 PROCEDURE — 85610 PROTHROMBIN TIME: CPT | Performed by: INTERNAL MEDICINE

## 2024-10-17 PROCEDURE — 99152 MOD SED SAME PHYS/QHP 5/>YRS: CPT

## 2024-10-17 PROCEDURE — 36415 COLL VENOUS BLD VENIPUNCTURE: CPT | Performed by: INTERNAL MEDICINE

## 2024-10-17 PROCEDURE — 250N000009 HC RX 250: Performed by: INTERNAL MEDICINE

## 2024-10-17 PROCEDURE — 272N000196 HC ACCESSORY CR5

## 2024-10-17 PROCEDURE — 99233 SBSQ HOSP IP/OBS HIGH 50: CPT | Performed by: PODIATRIST

## 2024-10-17 PROCEDURE — 250N000009 HC RX 250: Performed by: RADIOLOGY

## 2024-10-17 RX ORDER — NALOXONE HYDROCHLORIDE 0.4 MG/ML
0.4 INJECTION, SOLUTION INTRAMUSCULAR; INTRAVENOUS; SUBCUTANEOUS
Status: DISCONTINUED | OUTPATIENT
Start: 2024-10-17 | End: 2024-10-17 | Stop reason: HOSPADM

## 2024-10-17 RX ORDER — NALOXONE HYDROCHLORIDE 0.4 MG/ML
0.2 INJECTION, SOLUTION INTRAMUSCULAR; INTRAVENOUS; SUBCUTANEOUS
Status: DISCONTINUED | OUTPATIENT
Start: 2024-10-17 | End: 2024-10-17 | Stop reason: HOSPADM

## 2024-10-17 RX ORDER — IODIXANOL 320 MG/ML
100 INJECTION, SOLUTION INTRAVASCULAR ONCE
Status: DISCONTINUED | OUTPATIENT
Start: 2024-10-17 | End: 2024-10-17 | Stop reason: HOSPADM

## 2024-10-17 RX ORDER — IOPAMIDOL 755 MG/ML
100 INJECTION, SOLUTION INTRAVASCULAR ONCE
Status: COMPLETED | OUTPATIENT
Start: 2024-10-17 | End: 2024-10-17

## 2024-10-17 RX ORDER — FENTANYL CITRATE 50 UG/ML
25-50 INJECTION, SOLUTION INTRAMUSCULAR; INTRAVENOUS EVERY 5 MIN PRN
Status: DISCONTINUED | OUTPATIENT
Start: 2024-10-17 | End: 2024-10-17 | Stop reason: HOSPADM

## 2024-10-17 RX ORDER — FLUMAZENIL 0.1 MG/ML
0.2 INJECTION, SOLUTION INTRAVENOUS
Status: DISCONTINUED | OUTPATIENT
Start: 2024-10-17 | End: 2024-10-17 | Stop reason: HOSPADM

## 2024-10-17 RX ORDER — GENTAMICIN SULFATE 1 MG/G
CREAM TOPICAL DAILY PRN
Status: DISCONTINUED | OUTPATIENT
Start: 2024-10-17 | End: 2024-10-19

## 2024-10-17 RX ADMIN — FENTANYL CITRATE 50 MCG: 50 INJECTION, SOLUTION INTRAMUSCULAR; INTRAVENOUS at 09:01

## 2024-10-17 RX ADMIN — GENTAMICIN SULFATE: 1 CREAM TOPICAL at 20:43

## 2024-10-17 RX ADMIN — SEVELAMER CARBONATE 800 MG: 800 TABLET, FILM COATED ORAL at 12:10

## 2024-10-17 RX ADMIN — SEVELAMER CARBONATE 800 MG: 800 TABLET, FILM COATED ORAL at 18:17

## 2024-10-17 RX ADMIN — PANTOPRAZOLE SODIUM 40 MG: 40 TABLET, DELAYED RELEASE ORAL at 21:21

## 2024-10-17 RX ADMIN — ALLOPURINOL 200 MG: 100 TABLET ORAL at 21:21

## 2024-10-17 RX ADMIN — CARVEDILOL 6.25 MG: 6.25 TABLET, FILM COATED ORAL at 21:22

## 2024-10-17 RX ADMIN — ACETAMINOPHEN 1000 MG: 500 TABLET, FILM COATED ORAL at 21:21

## 2024-10-17 RX ADMIN — PIPERACILLIN AND TAZOBACTAM 2.25 G: 2; .25 INJECTION, POWDER, FOR SOLUTION INTRAVENOUS at 13:58

## 2024-10-17 RX ADMIN — SODIUM CHLORIDE 80 ML: 9 INJECTION, SOLUTION INTRAVENOUS at 19:39

## 2024-10-17 RX ADMIN — LIDOCAINE HYDROCHLORIDE 0.3 ML: 10 INJECTION, SOLUTION INFILTRATION; PERINEURAL at 17:50

## 2024-10-17 RX ADMIN — ACETAMINOPHEN AND CODEINE PHOSPHATE 1 TABLET: 300; 30 TABLET ORAL at 15:44

## 2024-10-17 RX ADMIN — ACETAMINOPHEN AND CODEINE PHOSPHATE 1 TABLET: 300; 30 TABLET ORAL at 21:20

## 2024-10-17 RX ADMIN — IOPAMIDOL 100 ML: 755 INJECTION, SOLUTION INTRAVENOUS at 19:39

## 2024-10-17 RX ADMIN — MIDAZOLAM 1 MG: 1 INJECTION INTRAMUSCULAR; INTRAVENOUS at 08:53

## 2024-10-17 RX ADMIN — CALCITRIOL CAPSULES 0.25 MCG 0.25 MCG: 0.25 CAPSULE ORAL at 21:21

## 2024-10-17 RX ADMIN — PANTOPRAZOLE SODIUM 40 MG: 40 TABLET, DELAYED RELEASE ORAL at 11:13

## 2024-10-17 RX ADMIN — SODIUM ZIRCONIUM CYCLOSILICATE 10 G: 5 POWDER, FOR SUSPENSION ORAL at 08:22

## 2024-10-17 RX ADMIN — ATORVASTATIN CALCIUM 40 MG: 40 TABLET, FILM COATED ORAL at 21:21

## 2024-10-17 RX ADMIN — PIPERACILLIN AND TAZOBACTAM 2.25 G: 2; .25 INJECTION, POWDER, FOR SOLUTION INTRAVENOUS at 04:01

## 2024-10-17 RX ADMIN — PIPERACILLIN AND TAZOBACTAM 2.25 G: 2; .25 INJECTION, POWDER, FOR SOLUTION INTRAVENOUS at 21:24

## 2024-10-17 ASSESSMENT — ACTIVITIES OF DAILY LIVING (ADL)
ADLS_ACUITY_SCORE: 30
ADLS_ACUITY_SCORE: 33
ADLS_ACUITY_SCORE: 30
ADLS_ACUITY_SCORE: 33
ADLS_ACUITY_SCORE: 32
ADLS_ACUITY_SCORE: 33
ADLS_ACUITY_SCORE: 32
ADLS_ACUITY_SCORE: 30
ADLS_ACUITY_SCORE: 33
ADLS_ACUITY_SCORE: 30
ADLS_ACUITY_SCORE: 33
ADLS_ACUITY_SCORE: 30
ADLS_ACUITY_SCORE: 33
ADLS_ACUITY_SCORE: 30
ADLS_ACUITY_SCORE: 30
ADLS_ACUITY_SCORE: 33
ADLS_ACUITY_SCORE: 30

## 2024-10-17 NOTE — PLAN OF CARE
Goal Outcome Evaluation:       DATE & TIME:10/16/24-10/17/24 0614-1785  Cognitive Concerns/ Orientation : A&Ox4   BEHAVIOR & AGGRESSION TOOL COLOR: Green  CIWA SCORE: NA   ABNL VS/O2: VSS on RA  MOBILITY: Independent  PAIN MANAGMENT: denies at this time  DIET: 2 g K diet. (NPO at midnight for angiogram)  BOWEL/BLADDER: Continent  ABNL LAB/BG: K 5.7 (receiving Lokelma), INR 4.41  DRAIN/DEVICES: LLQ PD cycle completed, catheter covered w/ gauze - CDI, R PIV SL  TELEMETRY RHYTHM: SR  SKIN: LLE toe black, red/luis extending up foot. Dry/flaky heels/feet. Scattered bruising.  TESTS/PROCEDURES: US, MRI completed. LLE angiogram w/ possible angioplasty scheduled for today.  D/C DATE: Pending work up, to home.  OTHER IMPORTANT INFO: Podiatry, vascular, nephrology, orthotics.

## 2024-10-17 NOTE — PROGRESS NOTES
Renal Medicine Progress Note            Assessment/Plan:     63 y.o man with ESRD and on PD     # ESRD: PD prescription as above               -[phone]  # Severe PAD with left great toe wound  # Hyperkalemia  # Anemia  # CAD, EF of 20-25%: Seems like he is well compensated  # CKD-MBD:               -Sensipar               -calcitriol     Plan;  # PD order placed for tonight  # Okay to have iodine contrast for CTA        Interval History:     Afebrile.   VSS.  No new questions  Podiatry recommendation amputation.     PD with total UF ~ neg 800            Medications and Allergies:     Current Facility-Administered Medications   Medication Dose Route Frequency Provider Last Rate Last Admin    acetaminophen (TYLENOL) tablet 1,000 mg  1,000 mg Oral BID Chai España MD   1,000 mg at 10/16/24 2104    allopurinol (ZYLOPRIM) tablet 200 mg  200 mg Oral QPM Chai España MD   200 mg at 10/16/24 2104    atorvastatin (LIPITOR) tablet 40 mg  40 mg Oral At Bedtime Chai España MD   40 mg at 10/16/24 2104    calcitRIOL (ROCALTROL) capsule 0.25 mcg  0.25 mcg Oral At Bedtime Chai España MD   0.25 mcg at 10/16/24 2104    carvedilol (COREG) tablet 6.25 mg  6.25 mg Oral At Bedtime Chai España MD   6.25 mg at 10/16/24 2104    cinacalcet (SENSIPAR) tablet 60 mg  60 mg Oral Once per day on Monday Wednesday Friday Chai España MD   60 mg at 10/16/24 0923    insulin aspart (NovoLOG) injection (RAPID ACTING)  1-7 Units Subcutaneous TID AC Chai España MD        insulin aspart (NovoLOG) injection (RAPID ACTING)  1-5 Units Subcutaneous At Bedtime Chai España MD   2 Units at 10/16/24 2229    pantoprazole (PROTONIX) EC tablet 40 mg  40 mg Oral BID Chai España MD   40 mg at 10/16/24 2104    piperacillin-tazobactam (ZOSYN) 2.25 g vial to attach to  ml bag  2.25 g Intravenous Q8H Chai España MD   2.25 g at 10/17/24 0401    [Held by provider] sacubitril-valsartan  (ENTRESTO) 49-51 MG per tablet 1 tablet  1 tablet Oral BID Chai España MD   1 tablet at 10/16/24 0923    sevelamer carbonate (RENVELA) tablet 800 mg  800 mg Oral TID w/meals Chai España MD   800 mg at 10/16/24 1808    sodium chloride (PF) 0.9% PF flush 3 mL  3 mL Intracatheter Q8H Chai España MD   3 mL at 10/17/24 0401    sodium zirconium cyclosilicate (LOKELMA) packet 10 g  10 g Oral TID Star Acuna MD   10 g at 10/17/24 0822    Followed by    [START ON 10/19/2024] sodium zirconium cyclosilicate (LOKELMA) packet 10 g  10 g Oral Daily Star Acuna MD        [Held by provider] torsemide (DEMADEX) tablet 100 mg  100 mg Oral QAM Chai España MD        [Held by provider] Warfarin Dose Required Daily - Pharmacist Managed  1 each Oral See Admin Instructions Chai España MD        warfarin-No DOSE today  1 each Does not apply no dose today (warfarin) Chai España MD          No Known Allergies         Physical Exam:   Vitals were reviewed   , Blood pressure 116/88, pulse 77, temperature 98.3  F (36.8  C), temperature source Oral, resp. rate 16, weight 79 kg (174 lb 2.6 oz), SpO2 97%.    Wt Readings from Last 3 Encounters:   10/17/24 79 kg (174 lb 2.6 oz)   10/15/24 78.6 kg (173 lb 4.5 oz)   10/08/24 78.7 kg (173 lb 8 oz)       Intake/Output Summary (Last 24 hours) at 10/17/2024 0934  Last data filed at 10/16/2024 2113  Gross per 24 hour   Intake 1320 ml   Output --   Net 1320 ml       GENERAL APPEARANCE: NAD  HEENT:  Eyes/ears/nose/neck grossly normal  RESP: lungs cta b c good efforts, no crackles, rhonchi or wheezes  CV: RRR  ABDOMEN: Soft, NT  EXTREMITIES/SKIN: no rashes/lesions on observed skin; no edema  NEUR: Awake, alert and conversing.         Data:     CBC RESULTS:     Recent Labs   Lab 10/16/24  0642 10/15/24  1317   WBC 5.3 7.5   RBC 3.29* 3.63*   HGB 10.9* 12.5*   HCT 33.4* 35.8*    299       Basic Metabolic Panel:  Recent Labs   Lab 10/17/24  0716  10/17/24  0653 10/17/24  0149 10/16/24  2122 10/16/24  1737 10/16/24  1543 10/16/24  0743 10/16/24  0642 10/15/24  2156 10/15/24  2052   NA  --  134*  --   --   --  132*  --  133*  --   --    POTASSIUM  --  5.0  --   --   --  5.7*  --  6.0*  --   --    CHLORIDE  --  96*  --   --   --  95*  --  96*  --   --    CO2  --  25  --   --   --  24  --  24  --   --    BUN  --  59.7*  --   --   --  65.3*  --  63.9*  --   --    CR  --  9.73*  --   --   --  10.72*  --  10.34*  --  9.84*   * 205* 275* 288* 118* 94   < > 96   < >  --    TERENCE  --  8.2*  --   --   --  8.9  --  9.1  --   --     < > = values in this interval not displayed.       INR  Recent Labs   Lab 10/17/24  0653 10/16/24  0642 10/15/24  2020 10/11/24  1452   INR 3.74* 4.41* 4.42* 2.2*      Attestation:   I have reviewed today's relevant vital signs, notes, medications, labs and imaging.    Star Acuna MD  [facility] - Nephrology  Office phone :[phone]  Pager: [phone]

## 2024-10-17 NOTE — PROGRESS NOTES
Cycler set up per protocol and per treatment orders     Results from previous treatment:  Effluent color and clarity: rolan and clear per pt MD aware and it happens sometimes    Total UF: 844 ml  Initial Drain: 181 ml  Avg Dwell: 1:04  Lost Dwell: 1:33    Cycler Serial Number: [id number]  Cassette Lot Number [id number]; Expiration Date: 2027  Total Treatment Volume: 10,000 mL  Total treatment time: 9 hrs  Fill Volume: 2,000 ml  Last Fill Volume:0 ml  Heater ba.5% 6000mL;  Lot #: [id number];  Expiration Date: 2025  Side Bag #1: .5% 6000mL;  Lot #: [id number];  Expiration Date: 2025    Number of cycles including final fill: 5  Dwell Time: 1:2 minutes  Last Fill Volume: 0 ml  Initial Drain Alarm: 0 ml  PD orders reviewed with Patient procedure and ESRD teaching done and questions answered.  Cycler ready for hook-up.    Report given to: Ivette 6th floor RN

## 2024-10-17 NOTE — PROCEDURES
Interventional Radiology Post-Procedure Note     Patient name:  Arnulfo Pritchett  MRN:  [id number]   Date:  10/17/2024     Procedure(s): Aborted LLE angiogram    Attending:  Radha    Sedation:  Moderate sedation    Pre/Post Procedure Diagnosis: CLTI    Drains/Lines:  None    Specimen(s):  None    Estimated Blood Loss:  None    Complications:  None     Findings:    Patient presented to IR for possible LLE angiogram + intervention in setting of LLE CLTI with non-healing L great toe wound.  LLE US 10/16 showed severe calcific atherosclerosis throughout, with monophasic waveforms starting in the proximal SFA.  Unfortunately no additional outside imaging is available for review.    US performed of the bilateral groins for possible arterial access. US of the right groin demonstrates similar severe calcific atherosclerosis of the CFA with associated shadowing and possible distal CFA occlusion. Grossly patent proximal SFA and PFA also with disease.  Left CFA demonstrates similar severe calcific atherosclerosis.  Based on these results and safety concerns for arterial access site, angiogram was not attempted.     Plan:    -Recommend CTA abdomen/pelvis runoff for further evaluation of disease and to guide procedure planning. LLE intervention may require brachial access and/or combined surgical/endovascular approach.  -The above was discussed with vascular surgery team, Dr. Hein, who is in agreement.      Jairo Garcia MD  Vascular & Interventional Radiology  [facility]  Pager: [phone]  Office: [phone]  10/17/2024  9:14 AM

## 2024-10-17 NOTE — PROGRESS NOTES
Patient is on IR schedule today Thursday 10/17 at ~ 830 for a Left leg angiogram with possible intervention.     -Procedural education reviewed with patient Arnulfo in detail including, but not limited to risks, benefits and alternatives, questions answered with understanding verbalized by him and consent is in IR.     [place] the IR [place] [phone] for procedural related questions.     Thanks, Heidi [facility] Interventional Radiology CNP ([phone]) (phone [phone])

## 2024-10-17 NOTE — PRE-PROCEDURE
GENERAL PRE-PROCEDURE:   Procedure:  Left leg angiogram with possible angioplasty and/or stent placement with moderate sedation  Date/Time:  10/17/2024 7:42 AM    Written consent obtained?: Yes    Risks and benefits: Risks, benefits and alternatives were discussed    Consent given by:  Patient  Patient states understanding of procedure being performed: Yes    Patient's understanding of procedure matches consent: Yes    Procedure consent matches procedure scheduled: Yes    Expected level of sedation:  Moderate  Appropriately NPO:  Yes  ASA Class:  3  Mallampati  :  Grade 2- soft palate, base of uvula, tonsillar pillars, and portion of posterior pharyngeal wall visible  Lungs:  Lungs clear with good breath sounds bilaterally  Heart:  Normal heart sounds and rate and systolic murmur  History & Physical reviewed:  History and physical reviewed and no updates needed  Statement of review:  I have reviewed the lab findings, diagnostic data, medications, and the plan for sedation    Total time: 20 minutes    Thanks [facility] Interventional Radiology CNP ([phone]) (phone [phone])

## 2024-10-17 NOTE — PROGRESS NOTES
[facility]OT [facility] ANKLE SURGERY CONSULTATION NOTE      ASSESSMENT:  64 yo male with Afib on warfarin, GERD, gout, insomnia, ESRD on PD, HFrEF, HTN, IDDM, diabetic peripheral neuropathy, CAD s/p stent placement x 3 (last 2021), TALI, HLD, and RLE PAD s/p balloon angio and stenting of SFA/popliteal arterie admitted for treatment of critical limb ischemia, LLE, and likely osteomyelitis of the left hallux distal phalanx.    Hba1c: 5.7    INR: 3.74 on 10/17    MEDICAL DECISION MAKING:   -Discussed all findings with patient. Chart and imaging reviewed.     -Left foot with distal hallux gangrene. Reviewed MRI findings supporting this and osteomyelitis to the distal phalanx. Discussed need for amputation, patient understands and agrees to this.     -Reviewed IR note, documenting that angiogram was unable to be performed. Will follow up on vascular plan. Amputation likely to be done following any vascular optimization.     -INR 3.74 this morning. Continue to hold coumadin. Goal INR < 2 for podiatry surgery.     -WBAT to LLE.     -Will continue to follow.       Alyce Salas DPM   [facility] Department of Podiatry/Foot & Ankle Surgery          _______________________________________________________________________________________________________________________________________________    CHIEF COMPLAINT:      I was asked by   Saida Nunez MD    to evaluate this patient, Arnulfo Pritchett, for a left foot infection.    PATIENT HISTORY:     Mr. Arnulfo Pritchett is a 64 yo male seen in follow up for his left foot. Back in his room following attempted angiogram. States some pain to hallux.        PAST MEDICAL HISTORY:   Past Medical History:   Diagnosis Date    CAD (coronary artery disease)     Chronic systolic heart failure (H)     ESRD (end stage renal disease) on dialysis (H)     Gastroesophageal reflux disease without esophagitis     Gout     HLD (hyperlipidemia)     TALI (obstructive sleep apnea)     PAD  (peripheral artery disease) (H)     S/p RLE stenting of SFA/popliteal arteries    Type 2 diabetes mellitus with diabetic neuropathy (H)         PAST SURGICAL HISTORY: History reviewed. No pertinent surgical history.     MEDICATIONS:  Reviewed in Epic. Current IV Zosyn.     ALLERGIES:  No Known Allergies     EXAM:  Vitals: /87   Pulse 76   Temp 98.3  F (36.8  C) (Oral)   Resp 11   Wt 79 kg (174 lb 2.6 oz)   SpO2 94%   BMI 23.85 kg/m    BMI= Body mass index is 23.85 kg/m .    Dermatologic: See photograph above.  Blackened, dry eschar at the tip of the left hallux.  There is dark, port wine stain like, erythema extending from the toe up to the pretibial area.  No significant associated edema.     Vascular: Dorsalis pedis and posterior tibial pulses are nonpalpable, bilateral foot.    Neurologic: Lower extremity sensation is diminished, bilateral foot, to light touch.  No evidence of neurological-based weakness or contracture in the lower extremities.       Musculoskeletal: Patient is ambulatory without an assistive device or brace.  No gross foot or ankle     IMAGING:   LEFT FOOT THREE OR MORE VIEWS  10/7/2024 2:05 PM      HISTORY: Left great toe wound. Ulcer of left great toe due to diabetes  mellitus (H).     COMPARISON: None.                                                                       IMPRESSION:  Soft tissue swelling great toe. There is subtle cortical  irregularity and probable erosive change along the distal phalanx/tuft  of the great toe which does raise concern for osteomyelitis. MRI could  be performed for further evaluation as needed.     Extensive atherosclerotic vascular calcifications. Mild scattered  arthritic changes. No evidence of an acute fracture.     MYRIAM DIAZ MD     MRI, left foot is pending     DATE: 10/16/2024     EXAM: LEFT LOWER EXTREMITY ARTERIAL DUPLEX     INDICATION: Peripheral vascular disease      TECHNIQUE: Duplex imaging was performed utilizing  "gray-scale,  compression, augmentation as appropriate, color-flow, Doppler waveform  analysis, and spectral Doppler imaging.     COMPARISON: [id number].     FINDINGS:      Heavily calcified atherosclerotic disease obscuring visualization of  portions of the left lobe show any vascular structure. Monophasic flow  in the mid and proximal SFA and throughout the remainder of the left  lower extremity. Elevated velocity distal SFA. Significantly abnormal  popliteal and infrapopliteal waveforms.                                                                      IMPRESSION:   1.  Heavily calcified atherosclerotic disease throughout the left  lower extremity obscuring portions of the underlying vasculature.  Waveforms suggest a severe proximal superficial femoral artery  stenosis with a likely distal superficial femoral artery stenosis also  seen. Severely abnormal waveforms in the infrapopliteal vessels  suggest severe disease in these vessels as well. Consider CTA with  runoff for further evaluation.     EMERSON HURTADO MD     LABS:   Lab Results   Component Value Date    A1C 5.7 10/16/2024    A1C 5.9 07/09/2024       Lab Results   Component Value Date    INR 4.41 10/16/2024    INR 4.42 10/15/2024    INR 2.2 10/11/2024    INR 4.8 10/08/2024    INR 2.25 07/11/2024    INR 2.60 07/10/2024    INR 3.29 07/09/2024       Lab Results   Component Value Date    WBC 5.3 10/16/2024     Lab Results   Component Value Date    HGB 10.9 10/16/2024     Lab Results   Component Value Date     10/16/2024       All cultures:  No results for input(s): \"CULTURE\" in the last 168 hours.    MRI:   Impression:     1. Distal phalanx of the first digit appears pointed distally with  replacement of normal fatty marrow and a soft tissue defect at the  distal tuft. About 14 mm of the distal phalanx appear normal with  normal fat signal and without significant edema. Constellation of  findings favors gangrene.  2. No other osseous abnormalities to " suggest osteomyelitis.  3. Degenerative changes of the interphalangeal joint and the  metatarsophalangeal joint of the first digit.  4. Widespread edema throughout the musculature as can be seen in  microvascular disease associated with diabetes.    I personally reviewed the images and agree with the reports as stated.

## 2024-10-17 NOTE — PROGRESS NOTES
[facility]    Medicine Progress Note - Hospitalist Service    Date of Admission:  10/15/2024  Date of Service: 10/17/2024    Assessment & Plan     Arnulfo Pritchett is a 63 year old male with ESRD on peritoneal dialysis, CAD, A fib, PAD who   Mr. Arnulfo Pritchett is a 64 yo male with Afib on warfarin, GERD, gout, insomnia, ESRD on PD, HFrEF, HTN, IDDM, diabetic peripheral neuropathy, CAD s/p stent placement x 3 (last 2021), TALI, HLD, and RLE PAD s/p balloon angio and stenting of SFA/popliteal arteries presented to [facility] ED with worsening pain and redness in left toe with streaking up the leg  for the last 2 days with throbbing pain.  He was not soaking his toe as instructed, he denies any fevers.  He was seen at [facility] ER and started on Zosyn and for further vascular work up in setting of recent ABIs with no flow.  Patient had an acute right lower extremity arterial occlusion and underwent SFA popliteal balloon angioplasty and stenting emergently on 5/6/2024. He has been managed postprocedure on Plavix and Coumadin.  INR is therapeutic and his BG have been stable and not elevated.      ## Worsening L great toe wound    ## Hx of acute RLE arterial occlusion s/p SFA popliteal balloon angioplasty and stenting, 5/6/2024  Presented to OSH with 2 day history of erythema and streaking redness going up his leg with associated increase pain and throbbing noted.  He has no fever but both CRP 55 and ESR 86 are elevated. XR of left foot show:   Soft tissue swelling great toe. There is subtle cortical  irregularity and probable erosive change along the distal phalanx/tuft  of the great toe which does raise concern for osteomyelitis.     Plan:   Obtain MRI -> pending  Podiatry consultation -> will need at least a partial left hallux amputation, once/if vascular status is optimized.   Vascular surgery consultation -> obtain LLE arterial duplex with probable LLE angiogram based off examination, likely  will be in the coming days.   IR consulted -> CTA abdomen/pelvis runoff today for further evaluation of disease and to guide procedure planning.  Continue Zosyn  hold warfarin and plavix in anticipation for possible surgical intervention and allow INR to drift down.  Consider IV heparin when INR < 2       ## Chronic paroxysmal afib s/p atrial ablation, 6/2023  ## HFrEF 2/2 ICM  ## HLD  ## HTN  ## Hx of CAD s/p ALESIA placement (last 2021)  PTA now warfarin and Plavix instead of apixaban as above, as well as Entresto, Coreg, torsemide and statin.  Most recent EF last month on 6/4 with severely decrease LV function with est EF 20-25%.    Noted blood pressure soft 90/70s -> BPs now normalized  Decrease Coreg from 12.5 mg BID to 6.25 mg BID  Holding Torsemide  Continue Entresto and statin       ## ESRD on PD 2/2 DM nephropathy  ## Secondary hyperparathyroidism  ## Hx of RCC s/p R nephrectomy, 5/2022  ## Hyperkalemia  PTA on nightly PD of which he has been tolerating. Access RLQ double cuffed coiled PD catheter placed 4/16/2021.   - Nephrology consulted for PD planning  - Continue RRT medications below  - Continue calcitriol, cinacalcet and sevelamer carbonate  - Continue every other day gentamicin cream to PD cath side  - Telemetry for Hyperkalemia 5.7 -> now wnl -> tele stopped  - [facility] for hyperK    ## DM type II  ## Diabetic neuropathy  Mod cho diet  Medium intensity sliding scale insulin  Last A1c 5.9 7/9/24       # Other chronic, stable medical conditions:  # Hx of gout: Continue PTA allopurinol  # TALI: Intolerant to nasal CPAP   # GERD: Continue daily PPI  # Insomnia: Continue PTA melatonin.          Diet: 2 Gram Sodium Diet    DVT Prophylaxis: Pneumatic Compression Devices  Rosario Catheter: Not present  Lines: None     Cardiac Monitoring: ACTIVE order. Indication: Electrolyte Imbalance (24 hours)- Magnesium <1.3 mg/ml; Potassium < =2.8 or > 5.5 mg/ml  Code Status: Full Code      Clinically Significant Risk Factors         # Hyperkalemia: Highest K = 6 mmol/L in last 2 days, will monitor as appropriate  # Hyponatremia: Lowest Na = 132 mmol/L in last 2 days, will monitor as appropriate  # Hypochloremia: Lowest Cl = 95 mmol/L in last 2 days, will monitor as appropriate   # Hypercalcemia: corrected calcium is >10.1, will monitor as appropriate    # Hypoalbuminemia: Lowest albumin = 2.1 g/dL at 10/17/2024  6:53 AM, will monitor as appropriate    # Coagulation Defect: INR = 3.74 (Ref range: 0.85 - 1.15) and/or PTT = N/A, will monitor for bleeding    # Hypertension: Noted on problem list               # Financial/Environmental Concerns: none         Disposition Plan     Medically Ready for Discharge: Anticipated in 2-4 Days             Benjie Collins MD  Hospitalist Service  [facility]  Securely message with [employer] (more info)  Text page via [employer] Paging/Directory   ______________________________________________________________________    Interval History     No acute events overnight  No fevers or chills  No CP/SOB  No nausea / vomiting or abdominal pain  No LLE pain  No new complaints today    Physical Exam   Vital Signs: Temp: 98.3  F (36.8  C) Temp src: Oral BP: 116/88 Pulse: 77   Resp: 16 SpO2: 97 % O2 Device: None (Room air)    Weight: 174 lbs 2.61 oz    General Appearance:  NAD, not spetic, AxOx3  Respiratory: CTA  Cardiovascular: RRR  GI: Soft, PD catheter site noted, non tender  Skin: warm and dry  Other:  Left toe tip is blackened, possible dry gangrene, streaky redness going up dorsum and lower leg.     ----------------------------------------------------------------------------------------    Medical Decision Making       35 MINUTES SPENT BY ME on the date of service doing chart review, history, exam, documentation & further activities per the note.      Data   ------------------------- PAST 24 HR DATA REVIEWED -----------------------------------------------    I have personally reviewed the  following data over the past 24 hrs:    N/A  \   N/A   / N/A     134 (L) 96 (L) 59.7 (H) /  123 (H)   5.0 25 9.73 (H) \     ALT: N/A AST: N/A AP: N/A TBILI: N/A   ALB: 2.1 (L) TOT PROTEIN: N/A LIPASE: N/A     INR:  3.74 (H) PTT:  N/A   D-dimer:  N/A Fibrinogen:  N/A       Imaging results reviewed over the past 24 hrs:   Recent Results (from the past 24 hour(s))   MR Foot Left w/o Contrast    Narrative    MR left foot without  contrast 10/16/2024 4:19 PM    History: left great toe tip gangrene with known PAD, eval for osteo    Techniques: Multiplanar multisequence imaging of the left foot was  obtained without  administration of intravenous contrast.    Comparison: Radiographs dated 10/7/2024    Findings:    Bones    The distal phalanx of the first digit appears pointed distally with  replacement of normal fatty marrow and a soft tissue defect at the  distal tuft. About 14 mm of the distal phalanx appear normal with  normal fat signal and without significant edema. There appears to be a  clear line between the distal tuft abnormalities in the relatively  normal proximal two thirds of the first digit.     Large subcortical cyst at the distal and lateral aspect of the  proximal phalanx of the first digit, degenerative changes of the first  MTP joint with joint space narrowing, subcortical cystic changes and  cartilage loss    No fracture, marrow contusion or other marrow infiltrative changes.    Joints and periarticular soft tissue    Joint effusion: Physiologic amount of joint fluid are present.    Plantar plates: Intersesamoidal ligament and sesamoidal phalangeal  ligaments of the first metatarsophalangeal joints are intact. Plantar  plates of the second through fifth toe at metatarsophalangeal joints  are grossly intact.    Intermetatarsal spaces: No interdigital neuroma. No intermetatarsal  bursitis.    Ligaments and Tendons    Lisfranc interosseous ligament: Intact.    Tendons: The visualized courses of flexor and  extensor tendons are  intact.     Muscles    Edema throughout the entire plantar musculature.      Impression    Impression:    1. Distal phalanx of the first digit appears pointed distally with  replacement of normal fatty marrow and a soft tissue defect at the  distal tuft. About 14 mm of the distal phalanx appear normal with  normal fat signal and without significant edema. Constellation of  findings favors gangrene.  2. No other osseous abnormalities to suggest osteomyelitis.  3. Degenerative changes of the interphalangeal joint and the  metatarsophalangeal joint of the first digit.  4. Widespread edema throughout the musculature as can be seen in  microvascular disease associated with diabetes.    JUTTA ELLERMANN, MD         SYSTEM ID:  [id number]     ------------------------- ENCOUNTER LABS ----------------------------------------------------------------  Recent Intellinote   Lab 10/17/24  1130 10/17/24  0745 10/17/24  0653 10/16/24  1737 10/16/24  1543 10/16/24  0743 10/16/24  0642 10/15/24  2052 10/15/24  2020 10/15/24  1317   WBC  --   --   --   --   --   --  5.3  --   --  7.5   HGB  --   --   --   --   --   --  10.9*  --   --  12.5*   MCV  --   --   --   --   --   --  102*  --   --  99   PLT  --   --   --   --   --   --  271  --   --  299   INR  --   --  3.74*  --   --   --  4.41*  --  4.42*  --    NA  --   --  134*  --  132*  --  133*  --   --   --    POTASSIUM  --   --  5.0  --  5.7*  --  6.0*  --   --   --    CHLORIDE  --   --  96*  --  95*  --  96*  --   --   --    CO2  --   --  25  --  24  --  24  --   --   --    BUN  --   --  59.7*  --  65.3*  --  63.9*  --   --   --    CR  --   --  9.73*  --  10.72*  --  10.34*   < >  --   --    ANIONGAP  --   --  13  --  13  --  13  --   --   --    TERENCE  --   --  8.2*  --  8.9  --  9.1  --   --   --    * 159* 205*   < > 94   < > 96   < >  --   --    ALBUMIN  --   --  2.1*  --   --   --   --   --   --   --     < > = values in this interval not displayed.       Most  Recent 3 CBC's:  Recent Labs   Lab Test 10/16/24  0642 10/15/24  1317 07/11/24  0659   WBC 5.3 7.5 5.7   HGB 10.9* 12.5* 11.3*   * 99 99    299 224     Most Recent 3 BMP's:  Recent Labs   Lab Test 10/17/24  1130 10/17/24  0745 10/17/24  0653 10/16/24  1737 10/16/24  1543 10/16/24  0743 10/16/24  0642   NA  --   --  134*  --  132*  --  133*   POTASSIUM  --   --  5.0  --  5.7*  --  6.0*   CHLORIDE  --   --  96*  --  95*  --  96*   CO2  --   --  25  --  24  --  24   BUN  --   --  59.7*  --  65.3*  --  63.9*   CR  --   --  9.73*  --  10.72*  --  10.34*   ANIONGAP  --   --  13  --  13  --  13   TERENCE  --   --  8.2*  --  8.9  --  9.1   * 159* 205*   < > 94   < > 96    < > = values in this interval not displayed.     Most Recent 2 LFT's:No lab results found.  Most Recent 3 INR's:  Recent Labs   Lab Test 10/17/24  0653 10/16/24  0642 10/15/24  2020   INR 3.74* 4.41* 4.42*     Most Recent 3 Troponin's:No lab results found.  Most Recent 3 BNP's:No lab results found.  Most Recent D-dimer:No lab results found.  Most Recent Cholesterol Panel:No lab results found.  Most Recent 6 Bacteria Isolates From Any Culture (See EPIC Reports for Culture Details):No lab results found.  Most Recent TSH and T4:No lab results found.  Most Recent 6 glucoses:  Recent Labs   Lab Test 10/17/24  1130 10/17/24  0745 10/17/24  0653 10/17/24  0149 10/16/24  2122 10/16/24  1737   * 159* 205* 275* 288* 118*     Most Recent Urinalysis:No lab results found.  Most Recent ABG:No lab results found.  Most Recent ESR & CRP:  Recent Labs   Lab Test 10/15/24  1317   SED 86*   CRPI 55.81*

## 2024-10-17 NOTE — PLAN OF CARE
Goal Outcome Evaluation: Progressing    Reason for Admission: LLE pain and redness    Evaluation of Goal:   Patient is A&Ox 4. Vitals are stable on RA. Tele NSR. Patient is up with assist of one with gait belt and cane, ambulated to the bathroom throughout the day. Patient reported mild pain to his left foot but declined pain medications. CTA runoff ordered. LLE has redness on ankle/foot. PD site is CDI. Patient scoring green on the Aggression Stoplight Tool. Pt calm and cooperative with cares, able to make needs known, call light within reach, bed alarm on for safety. Discharge disposition is pending. Also per Nephrology okay have CT with contrast.     Lilibeth Lara RN on 10/17/2024 at 2:53 PM

## 2024-10-17 NOTE — PLAN OF CARE
Summary: Worsening L great toe wound,  LLE toe, ESRD on PD  DATE & TIME:10/17 [id number]  Cognitive Concerns/ Orientation : A&Ox4   BEHAVIOR & AGGRESSION TOOL COLOR: Green  ABNL VS/O2: VSS on RA  MOBILITY: Independent w/ quad cane  PAIN MANAGMENT: 4/10 pain in foot, tylenol 3 given x1  DIET: 2 g K diet.  BOWEL/BLADDER: Continent  ABNL LAB/BG: K 5 (receiving Lokelma), INR 3.74, Na 134, creat 9.73, GFR 5, phos 6.8  DRAIN/DEVICES: LLQ PD cycle completed, catheter covered w/ gauze - CDI, 2 R PIV SL (additional placed higher for CT contrast)  SKIN: LLE toe black, red/luis streaking extending up foot. Dry/flaky heels/feet. Scattered bruising.  TESTS/PROCEDURES: angiogram aborted today d/t severity of atherosclerosis, CT pending (this evening at some point)  D/C DATE: Pending work up, dc to home.  OTHER IMPORTANT INFO: Podiatry, vascular, nephrology, orthotics.

## 2024-10-17 NOTE — IR NOTE
Interventional Radiology Intra-procedural Nursing Note    Patient Name: Arnulfo Pritchett  Medical Record Number: [id number]  Today's Date: October 17, 2024    Procedure: Left Leg Angiogram under Moderate Sedation  Start time: 0900  End time: 0910  Report provided to: Lilibeth GREGG  Patient depart time and location: 0915 to 6614    Note: Patient entered Interventional Radiology Suite number 2 via cart. Patient awake, alert and orientated. Assisted onto procedural table in supine position. Prepped and draped.  Dr. Garcia in room. Time out and procedure started. Vital signs stable. Telemetry reading NSR.    Procedure well tolerated by patient without complications. Procedure end with debrief by Dr. Garcia.      Administered medication totals:   Versed 1 mg IVP  Fentanyl 50 mcg IVP

## 2024-10-18 LAB
ALBUMIN SERPL BCG-MCNC: 2.2 G/DL (ref 3.5–5.2)
ANION GAP SERPL CALCULATED.3IONS-SCNC: 13 MMOL/L (ref 7–15)
BUN SERPL-MCNC: 53.6 MG/DL (ref 8–23)
CALCIUM SERPL-MCNC: 8.4 MG/DL (ref 8.8–10.4)
CHLORIDE SERPL-SCNC: 95 MMOL/L (ref 98–107)
CREAT SERPL-MCNC: 9.36 MG/DL (ref 0.67–1.17)
EGFRCR SERPLBLD CKD-EPI 2021: 6 ML/MIN/1.73M2
GLUCOSE BLDC GLUCOMTR-MCNC: 131 MG/DL (ref 70–99)
GLUCOSE BLDC GLUCOMTR-MCNC: 196 MG/DL (ref 70–99)
GLUCOSE BLDC GLUCOMTR-MCNC: 199 MG/DL (ref 70–99)
GLUCOSE BLDC GLUCOMTR-MCNC: 251 MG/DL (ref 70–99)
GLUCOSE BLDC GLUCOMTR-MCNC: 95 MG/DL (ref 70–99)
GLUCOSE SERPL-MCNC: 251 MG/DL (ref 70–99)
HCO3 SERPL-SCNC: 25 MMOL/L (ref 22–29)
HOLD SPECIMEN: NORMAL
INR PPP: 2.77 (ref 0.85–1.15)
PHOSPHATE SERPL-MCNC: 6.3 MG/DL (ref 2.5–4.5)
POTASSIUM SERPL-SCNC: 4.6 MMOL/L (ref 3.4–5.3)
SODIUM SERPL-SCNC: 133 MMOL/L (ref 135–145)

## 2024-10-18 PROCEDURE — 99232 SBSQ HOSP IP/OBS MODERATE 35: CPT | Performed by: INTERNAL MEDICINE

## 2024-10-18 PROCEDURE — 120N000001 HC R&B MED SURG/OB

## 2024-10-18 PROCEDURE — 250N000013 HC RX MED GY IP 250 OP 250 PS 637: Performed by: INTERNAL MEDICINE

## 2024-10-18 PROCEDURE — 90945 DIALYSIS ONE EVALUATION: CPT

## 2024-10-18 PROCEDURE — 85610 PROTHROMBIN TIME: CPT | Performed by: INTERNAL MEDICINE

## 2024-10-18 PROCEDURE — 80069 RENAL FUNCTION PANEL: CPT | Performed by: STUDENT IN AN ORGANIZED HEALTH CARE EDUCATION/TRAINING PROGRAM

## 2024-10-18 PROCEDURE — 36415 COLL VENOUS BLD VENIPUNCTURE: CPT | Performed by: STUDENT IN AN ORGANIZED HEALTH CARE EDUCATION/TRAINING PROGRAM

## 2024-10-18 PROCEDURE — 99232 SBSQ HOSP IP/OBS MODERATE 35: CPT | Performed by: STUDENT IN AN ORGANIZED HEALTH CARE EDUCATION/TRAINING PROGRAM

## 2024-10-18 PROCEDURE — 250N000011 HC RX IP 250 OP 636: Performed by: INTERNAL MEDICINE

## 2024-10-18 RX ORDER — GENTAMICIN SULFATE 1 MG/G
CREAM TOPICAL DAILY PRN
Status: DISCONTINUED | OUTPATIENT
Start: 2024-10-18 | End: 2024-10-21

## 2024-10-18 RX ADMIN — ACETAMINOPHEN 1000 MG: 500 TABLET, FILM COATED ORAL at 21:19

## 2024-10-18 RX ADMIN — CALCITRIOL CAPSULES 0.25 MCG 0.25 MCG: 0.25 CAPSULE ORAL at 21:20

## 2024-10-18 RX ADMIN — ACETAMINOPHEN 1000 MG: 500 TABLET, FILM COATED ORAL at 08:41

## 2024-10-18 RX ADMIN — CINACALCET 60 MG: 30 TABLET ORAL at 08:44

## 2024-10-18 RX ADMIN — PIPERACILLIN AND TAZOBACTAM 2.25 G: 2; .25 INJECTION, POWDER, FOR SOLUTION INTRAVENOUS at 21:14

## 2024-10-18 RX ADMIN — SEVELAMER CARBONATE 800 MG: 800 TABLET, FILM COATED ORAL at 17:46

## 2024-10-18 RX ADMIN — PANTOPRAZOLE SODIUM 40 MG: 40 TABLET, DELAYED RELEASE ORAL at 08:41

## 2024-10-18 RX ADMIN — INSULIN ASPART 2 UNITS: 100 INJECTION, SOLUTION INTRAVENOUS; SUBCUTANEOUS at 08:41

## 2024-10-18 RX ADMIN — ALLOPURINOL 200 MG: 100 TABLET ORAL at 21:20

## 2024-10-18 RX ADMIN — ATORVASTATIN CALCIUM 40 MG: 40 TABLET, FILM COATED ORAL at 21:20

## 2024-10-18 RX ADMIN — ONDANSETRON 4 MG: 4 TABLET, ORALLY DISINTEGRATING ORAL at 08:41

## 2024-10-18 RX ADMIN — PIPERACILLIN AND TAZOBACTAM 2.25 G: 2; .25 INJECTION, POWDER, FOR SOLUTION INTRAVENOUS at 05:40

## 2024-10-18 RX ADMIN — ACETAMINOPHEN AND CODEINE PHOSPHATE 1 TABLET: 300; 30 TABLET ORAL at 10:30

## 2024-10-18 RX ADMIN — PANTOPRAZOLE SODIUM 40 MG: 40 TABLET, DELAYED RELEASE ORAL at 21:20

## 2024-10-18 RX ADMIN — SEVELAMER CARBONATE 800 MG: 800 TABLET, FILM COATED ORAL at 10:30

## 2024-10-18 RX ADMIN — SEVELAMER CARBONATE 800 MG: 800 TABLET, FILM COATED ORAL at 13:10

## 2024-10-18 RX ADMIN — CARVEDILOL 6.25 MG: 6.25 TABLET, FILM COATED ORAL at 21:19

## 2024-10-18 RX ADMIN — PIPERACILLIN AND TAZOBACTAM 2.25 G: 2; .25 INJECTION, POWDER, FOR SOLUTION INTRAVENOUS at 13:10

## 2024-10-18 ASSESSMENT — ACTIVITIES OF DAILY LIVING (ADL)
ADLS_ACUITY_SCORE: 33
ADLS_ACUITY_SCORE: 33
ADLS_ACUITY_SCORE: 32
ADLS_ACUITY_SCORE: 34
ADLS_ACUITY_SCORE: 32
ADLS_ACUITY_SCORE: 33
ADLS_ACUITY_SCORE: 32
ADLS_ACUITY_SCORE: 33
ADLS_ACUITY_SCORE: 34
ADLS_ACUITY_SCORE: 33
ADLS_ACUITY_SCORE: 32
ADLS_ACUITY_SCORE: 32
ADLS_ACUITY_SCORE: 33
ADLS_ACUITY_SCORE: 32
ADLS_ACUITY_SCORE: 32
ADLS_ACUITY_SCORE: 34
ADLS_ACUITY_SCORE: 32
ADLS_ACUITY_SCORE: 32

## 2024-10-18 NOTE — PROVIDER NOTIFICATION
MD Notification    Notified Person: MD    Notified Person Name: Dr. Rezah    Notification Date/Time: 10/18/2024 @ 1300    Notification Interaction: Vocera    Purpose of Notification: There is no active order for Tele but  MD note says tele ordered. Just wondering if patient needs to be on tele.     Orders Received: Tele discontinued    Comments:

## 2024-10-18 NOTE — PROGRESS NOTES
Renal Medicine Progress Note            Assessment/Plan:     63 y.o man with ESRD and on PD     # ESRD: PD prescription as above               -[phone]  # Severe PAD with left great toe wound  # Hyperkalemia: improved.  # Anemia  # CAD, EF of 20-25%: Seems like he is well compensated  # CKD-MBD:               -Sensipar               -calcitriol     Plan;  # PD order placed for tonight          Interval History:     Afebrile.   VSS.  No questions from patient.  Denies abdominal pain, N/V and SOB.    Initial drain ~ 170 ml  Toal UF ~ 330 ml          Medications and Allergies:     Current Facility-Administered Medications   Medication Dose Route Frequency Provider Last Rate Last Admin    acetaminophen (TYLENOL) tablet 1,000 mg  1,000 mg Oral BID Chai España MD   1,000 mg at 10/18/24 0841    allopurinol (ZYLOPRIM) tablet 200 mg  200 mg Oral QPM Chai España MD   200 mg at 10/17/24 2121    atorvastatin (LIPITOR) tablet 40 mg  40 mg Oral At Bedtime Chai España MD   40 mg at 10/17/24 2121    calcitRIOL (ROCALTROL) capsule 0.25 mcg  0.25 mcg Oral At Bedtime Chai España MD   0.25 mcg at 10/17/24 2121    carvedilol (COREG) tablet 6.25 mg  6.25 mg Oral At Bedtime Chai España MD   6.25 mg at 10/17/24 2122    cinacalcet (SENSIPAR) tablet 60 mg  60 mg Oral Once per day on Monday Wednesday Friday Chai España MD   60 mg at 10/18/24 0844    insulin aspart (NovoLOG) injection (RAPID ACTING)  1-7 Units Subcutaneous TID AC Chai España MD   2 Units at 10/18/24 0841    insulin aspart (NovoLOG) injection (RAPID ACTING)  1-5 Units Subcutaneous At Bedtime Chai España MD   2 Units at 10/16/24 2229    pantoprazole (PROTONIX) EC tablet 40 mg  40 mg Oral BID Chai España MD   40 mg at 10/18/24 0841    piperacillin-tazobactam (ZOSYN) 2.25 g vial to attach to  ml bag  2.25 g Intravenous Q8H Chai España MD   2.25 g at 10/18/24 1310    [Held by provider]  sacubitril-valsartan (ENTRESTO) 49-51 MG per tablet 1 tablet  1 tablet Oral BID Chai España MD   1 tablet at 10/16/24 0923    sevelamer carbonate (RENVELA) tablet 800 mg  800 mg Oral TID w/meals Chai España MD   800 mg at 10/18/24 1310    sodium chloride (PF) 0.9% PF flush 3 mL  3 mL Intracatheter Q8H Chai España MD   3 mL at 10/18/24 1310    [Held by provider] torsemide (DEMADEX) tablet 100 mg  100 mg Oral QAM Chai España MD        [Held by provider] Warfarin Dose Required Daily - Pharmacist Managed  1 each Oral See Admin Instructions Chai España MD        warfarin-No DOSE today  1 each Does not apply no dose today (warfarin) Chai España MD          No Known Allergies         Physical Exam:   Vitals were reviewed   , Blood pressure 131/85, pulse 80, temperature 98.6  F (37  C), temperature source Oral, resp. rate 16, weight 79.1 kg (174 lb 6.1 oz), SpO2 95%.    Wt Readings from Last 3 Encounters:   10/18/24 79.1 kg (174 lb 6.1 oz)   10/15/24 78.6 kg (173 lb 4.5 oz)   10/08/24 78.7 kg (173 lb 8 oz)       Intake/Output Summary (Last 24 hours) at 10/18/2024 1326  Last data filed at 10/18/2024 1314  Gross per 24 hour   Intake 720 ml   Output 400 ml   Net 320 ml     GENERAL APPEARANCE: NAD  HEENT:  Eyes/ears/nose/neck grossly normal  RESP: lungs cta b c good efforts, no crackles, rhonchi or wheezes  CV: RRR  ABDOMEN: Soft, NT  EXTREMITIES/SKIN: no rashes/lesions on observed skin; no edema  NEUR: Awake, alert and conversing.         Data:     CBC RESULTS:     Recent Labs   Lab 10/16/24  0642 10/15/24  1317   WBC 5.3 7.5   RBC 3.29* 3.63*   HGB 10.9* 12.5*   HCT 33.4* 35.8*    299       Basic Metabolic Panel:  Recent Labs   Lab 10/18/24  1303 10/18/24  0805 10/18/24  0649 10/18/24  0144 10/17/24  2119 10/17/24  1758 10/17/24  0745 10/17/24  0653 10/16/24  1737 10/16/24  1543 10/16/24  0743 10/16/24  0642 10/15/24  2156 10/15/24  2052   NA  --   --  133*  --   --   --    --  134*  --  132*  --  133*  --   --    POTASSIUM  --   --  4.6  --   --   --   --  5.0  --  5.7*  --  6.0*  --   --    CHLORIDE  --   --  95*  --   --   --   --  96*  --  95*  --  96*  --   --    CO2  --   --  25  --   --   --   --  25  --  24  --  24  --   --    BUN  --   --  53.6*  --   --   --   --  59.7*  --  65.3*  --  63.9*  --   --    CR  --   --  9.36*  --   --   --   --  9.73*  --  10.72*  --  10.34*  --  9.84*   GLC 95 196* 251* 251* 170* 96   < > 205*   < > 94   < > 96   < >  --    TERENCE  --   --  8.4*  --   --   --   --  8.2*  --  8.9  --  9.1  --   --     < > = values in this interval not displayed.       INR  Recent Labs   Lab 10/18/24  0649 10/17/24  0653 10/16/24  0642 10/15/24  2020   INR 2.77* 3.74* 4.41* 4.42*      Attestation:   I have reviewed today's relevant vital signs, notes, medications, labs and imaging.    Star Acuna MD  [facility] - Nephrology  Office phone :[phone]  Pager: [phone]

## 2024-10-18 NOTE — PLAN OF CARE
Goal Outcome Evaluation:       DATE & TIME:10/17/24-10/18/24 [id number]  Cognitive Concerns/ Orientation : A&Ox4   BEHAVIOR & AGGRESSION TOOL COLOR: Green  ABNL VS/O2: DBP 90, other VSS on RA  MOBILITY: SBA w/ quad cane, patient report some weakness  PAIN MANAGMENT: Gave PRN Tylenol #3 for LE pain  DIET: 2 g Na diet  BOWEL/BLADDER: Continent, using urinal in bed  ABNL LAB/BG: , 251, INR 3.74, Na 134, cr 9.73, GFR 5, phos 6.8  TELE: SR  DRAIN/DEVICES: LLQ PD cycle started by charge RN, x2 R PIV SL   SKIN: LLE toe black, red/luis streaking extending up foot. Dry/flaky heels/feet. Scattered bruising.  TESTS/PROCEDURES: angiogram aborted today d/t severity of atherosclerosis, CT abdomen/pelvis completed. MRI pending  D/C DATE: Pending work up, dc to home.  OTHER IMPORTANT INFO: Podiatry, vascular, nephrology, orthotics.

## 2024-10-18 NOTE — TELEPHONE ENCOUNTER
HOME INR MONITORING REQUEST    REQUEST TYPE: TRANSFER existing home monitor to [facility], current monitoring company is [employer]    ELIGIBILITY:    Current plan for long term/indefinite anticoagulation: Yes     >= 3 months of warfarin therapy may be required prior to receiving meter (order may be started in advance)  [facility] episode start date: 10/8/2024 but New start as of 5/7/2024  Episode start date < 90 days: Yes but on warfarin prior to [facility] referral. See above    Insurance approved indication for anticoagulation required to have a home monitor covered; not all indications for warfarin are currently covered     Arnulfo with commonly covered diagnoses: I48.0      Paroxysmal atrial fibrillation      PROGRAM REQUIREMENTS:     Patient or caregiver must agree to the below terms and testing requirements for home INR monitor order to be placed    Patient or designated caregiver have skills necessary to perform the self test: Yes    Testing Requirements: Yes  Every 1 week testing is required by many insurance companies including [employer]; some companies may allow every 2 weeks, but not longer.  Testing should be performed during [facility] business days (M-F)  In clinic lab visits may be periodically required if a home result is an error, INR  > 8, or testing supplies are not available.    Primary coverage: [facility] MEDICARE SUPPLEMENT  Secondary coverage:     Must use a [employer] approved service provider: Yes   Home meters, testing supplies, meter training, and reporting of INR results done through the approved outside company.   [employer] will continue to receive and manage INR results.    Patient/Caregiver will be contacted by home monitoring company to review insurance coverage with home monitoring company prior to enrolling. Please watch for call or voicemail from 1-800 number from [employer]), [facility], or [employer]    Best person to contact to discuss coverage: Patient at  Home number on file [phone] (home)     Home monitoring application often takes 4-6 weeks. Patient should continue to follow up with recommended INR monitoring in clinic until receives monitor and training completed: Yes    ___________________________________________________________________________________________________________________________    Patient/Caregiver agree to all of the above terms, request routed to [facility] coordinator to obtain provider orders    [facility] Referring provider: Dr Chai Oneill

## 2024-10-18 NOTE — PLAN OF CARE
Goal Outcome Evaluation:       Date/Time:10/18/2024 ([id number])    Mental Status: A&O x4  Activity/dangle: Up with SBA and cane  Diet: 2 gram Sodium diet  Pain:Tylenol #3  Rosario/Voiding: Urinal/ bathroom  Tele/Restraints/Iso: NA  02/LDA: IV-SL  D/C Date: TBD  Other Info:  Had BM x1 this morning, had emesis x1 this morning relief with Zofran.

## 2024-10-18 NOTE — PROGRESS NOTES
[facility]    Medicine Progress Note - Hospitalist Service    Date of Admission:  10/15/2024  Date of Service: 10/18/2024    Assessment & Plan     Arnulfo Pritchtet is a 63 year old male with ESRD on peritoneal dialysis, CAD, A fib, PAD who   Mr. Arnulfo Pritchett is a 64 yo male with Afib on warfarin, GERD, gout, insomnia, ESRD on PD, HFrEF, HTN, IDDM, diabetic peripheral neuropathy, CAD s/p stent placement x 3 (last 2021), TALI, HLD, and RLE PAD s/p balloon angio and stenting of SFA/popliteal arteries presented to [facility] ED with worsening pain and redness in left toe with streaking up the leg  for the last 2 days with throbbing pain.  He was not soaking his toe as instructed, he denies any fevers.  He was seen at [facility] ER and started on Zosyn and for further vascular work up in setting of recent ABIs with no flow.  Patient had an acute right lower extremity arterial occlusion and underwent SFA popliteal balloon angioplasty and stenting emergently on 5/6/2024. He has been managed postprocedure on Plavix and Coumadin.  INR is therapeutic and his BG have been stable and not elevated.      ## Worsening L great toe wound    ## Hx of acute RLE arterial occlusion s/p SFA popliteal balloon angioplasty and stenting, 5/6/2024  Presented to OSH with 2 day history of erythema and streaking redness going up his leg with associated increase pain and throbbing noted.  He has no fever but both CRP 55 and ESR 86 are elevated. XR of left foot show:   Soft tissue swelling great toe. There is subtle cortical  irregularity and probable erosive change along the distal phalanx/tuft  of the great toe which does raise concern for osteomyelitis.     Plan:   Obtain MRI foot ->  Distal phalanx of the first digit appears pointed distally with replacement of normal fatty marrow and a soft tissue defect at the distal tuft. About 14 mm of the distal phalanx appear normal with normal fat signal and without significant  edema. Constellation of findings favors gangrene.  Podiatry consultation -> will need at least a partial left hallux amputation, once/if vascular status is optimized.   Vascular surgery consultation -> obtain LLE arterial duplex with probable LLE angiogram based off examination, likely will be in the coming days.   IR consulted -> CTA abdomen/pelvis runoff today for further evaluation of disease and to guide procedure planning.  Continue Zosyn  hold warfarin and plavix in anticipation for possible surgical intervention and allow INR to drift down.  Consider IV heparin when INR < 2       ## Chronic paroxysmal afib s/p atrial ablation, 6/2023  ## HFrEF 2/2 ICM  ## HLD  ## HTN  ## Hx of CAD s/p ALESIA placement (last 2021)  PTA now warfarin and Plavix instead of apixaban as above, as well as Entresto, Coreg, torsemide and statin.  Most recent EF last month on 6/4 with severely decrease LV function with est EF 20-25%.    Noted blood pressure soft 90/70s -> BPs now normalized  Decrease Coreg from 12.5 mg BID to 6.25 mg BID  Holding Torsemide  Continue Entresto and statin       ## ESRD on PD 2/2 DM nephropathy  ## Secondary hyperparathyroidism  ## Hx of RCC s/p R nephrectomy, 5/2022  ## Hyperkalemia  PTA on nightly PD of which he has been tolerating. Access RLQ double cuffed coiled PD catheter placed 4/16/2021.   - Nephrology consulted for PD planning  - Continue RRT medications below  - Continue calcitriol, cinacalcet and sevelamer carbonate  - Continue every other day gentamicin cream to PD cath side  - Telemetry for Hyperkalemia 5.7 -> now wnl -> tele stopped  - [facility] for hyperK    ## DM type II  ## Diabetic neuropathy  Mod cho diet  Medium intensity sliding scale insulin  Last A1c 5.9 7/9/24       # Other chronic, stable medical conditions:  # Hx of gout: Continue PTA allopurinol  # TALI: Intolerant to nasal CPAP   # GERD: Continue daily PPI  # Insomnia: Continue PTA melatonin.          Diet: 2 Gram Sodium Diet    DVT  Prophylaxis: Pneumatic Compression Devices  Rosario Catheter: Not present  Lines: None     Cardiac Monitoring: None  Code Status: Full Code      Clinically Significant Risk Factors        # Hyperkalemia: Highest K = 5.7 mmol/L in last 2 days, will monitor as appropriate  # Hyponatremia: Lowest Na = 132 mmol/L in last 2 days, will monitor as appropriate  # Hypochloremia: Lowest Cl = 95 mmol/L in last 2 days, will monitor as appropriate      # Hypoalbuminemia: Lowest albumin = 2.1 g/dL at 10/17/2024  6:53 AM, will monitor as appropriate    # Coagulation Defect: INR = 2.77 (Ref range: 0.85 - 1.15) and/or PTT = N/A, will monitor for bleeding    # Hypertension: Noted on problem list               # Financial/Environmental Concerns: none         Disposition Plan     Medically Ready for Discharge: Anticipated in 2-4 Days             Benjie Collins MD  Hospitalist Service  [facility]  Securely message with [employer] (more info)  Text page via [employer] Paging/Directory   ______________________________________________________________________    Interval History     No acute events overnight  No fevers or chills  No CP/SOB  No nausea / vomiting or abdominal pain  No LLE pain  No new complaints today  Awaiting for INR to reverse    Physical Exam   Vital Signs: Temp: 98.6  F (37  C) Temp src: Oral BP: 131/85 Pulse: 80   Resp: 16 SpO2: 95 % O2 Device: None (Room air)    Weight: 174 lbs 6.14 oz    General Appearance:  NA  Respiratory: CTABL  Cardiovascular: RRR with no m / r / g  GI: Soft, PD catheter site noted, non tender  Skin: warm and dry  Other:  Left toe tip is blackened, dry gangrene.  ----------------------------------------------------------------------------------------    Medical Decision Making       25 MINUTES SPENT BY ME on the date of service doing chart review, history, exam, documentation & further activities per the note.      Data   ------------------------- PAST 24 HR DATA REVIEWED  -----------------------------------------------    I have personally reviewed the following data over the past 24 hrs:    N/A  \   N/A   / N/A     133 (L) 95 (L) 53.6 (H) /  95   4.6 25 9.36 (H) \     ALT: N/A AST: N/A AP: N/A TBILI: N/A   ALB: 2.2 (L) TOT PROTEIN: N/A LIPASE: N/A     INR:  2.77 (H) PTT:  N/A   D-dimer:  N/A Fibrinogen:  N/A       Imaging results reviewed over the past 24 hrs:   Recent Results (from the past 24 hour(s))   CTA Abdomen Pelvis Runoff w Contrast    Narrative    EXAM: CTA ABDOMEN PELVIS RUNOFF W CONTRAST  LOCATION: [facility]  DATE: 10/17/2024    INDICATION: Severe PAD with LLE CLTI with non healing wound, procedure planning for possible LLE intervention  COMPARISON: Left lower extremity arterial duplex ultrasound 10/16/2024.  TECHNIQUE: Helical acquisition through the abdomen, pelvis, and bilateral lower extremities was performed during the arterial phase of contrast enhancement using IV Contrast. 2D and 3D reconstructions were performed by the CT technologist. Dose reduction   techniques were used.   CONTRAST: 100ml isovue 370    FINDINGS:  AORTA: Moderate, calcified atherosclerotic changes throughout the abdominal aorta which is moderately tortuous at the infrarenal portion. No significant aortic stenosis or aneurysmal dilatation. Normal caliber celiac and superior mesenteric arteries.   Moderate calcified changes within the left renal artery with mild tandem stenoses. Opacification of a right renal artery stump. Patent inferior mesenteric artery.    RIGHT LEG: Moderate calcified changes of the common and internal iliac arteries without significant stenosis. Normal caliber external iliac artery. Near circumferential calcified atherosclerotic changes of the common femoral artery without stenosis.   Moderate calcified changes of the profunda femoris and superficial femoral arteries without stenosis. Bulky calcified changes of the popliteal artery with a patent,  above-knee popliteal stent. No severe stenosis or occlusion of the below-knee popliteal   artery. Occluded anterior tibial artery origin with reconstitution at its proximal vertical segment. Normal caliber popliteal artery opacified down to the pedal artery. Normal caliber peroneal artery. Moderate calcified atherosclerotic changes of the   posterior tibial artery. There appear to be tandem, focal moderate to severe stenoses of the distal posterior tibial artery.    LEFT LEG: Moderate calcified atherosclerotic changes of the common and internal iliac arteries without stenosis. Normal caliber external iliac artery. Moderate calcified atherosclerotic changes of the common femoral and profunda femoris arteries without   significant stenosis. Moderate calcified atherosclerotic changes of the superficial femoral artery with what appears to be a focal, severe calcified stenosis at its proximal aspect. Circumferential, bulky calcified atherosclerotic changes of the   popliteal artery. Irregular, long segment occlusion of the mid to distal popliteal artery for a length of approximately 9.3 cm, secondary to low attenuation material which may represent thrombus versus soft atherosclerotic plaque. Reconstitution of the   infrapopliteal arteries at the origins. Mild atherosclerotic irregularity of the anterior tibial artery, tibioperoneal trunk and peroneal arteries without significant stenosis. Irregular calcified changes of the posterior tibial artery with appear to be   severe stenoses at its distal aspect..    LUNG BASES: Small to moderate right pleural effusion. Small left pleural effusion.    HEPATOBILIARY: Normal.    PANCREAS: Normal.    SPLEEN: Normal.    ADRENAL GLANDS: Normal.    KIDNEYS: 4 mm hyperenhancing nodule within the anterior aspect of the lower pole the left kidney (image 77 series 4). Benign-appearing left renal cysts. No hydronephrosis. Absent right kidney. Mildly distended bladder.    BOWEL: No  obstruction or inflammatory change. Moderate amount of free fluid, mainly within the perihepatic space and right paracolic gutter. Moderate pneumoperitoneum. Indwelling left abdominal peritoneal dialysis catheter with its loop formed within the   retrovesical region, in adequate position.    LYMPH NODES: No lymphadenopathy.    PELVIC ORGANS: Moderate prostatic enlargement.    MUSCULOSKELETAL: Multilevel degenerative disc disease, most marked at the L4 interspace. Chronic appearing ununited fractures of the visualized right ninth and 10th ribs. Moderate degenerative changes of the bilateral hips and knees. Small right knee   effusion.      Impression    CONCLUSION:  1.  Right leg: No inflow or femoropopliteal segment obstructions. Occluded anterior tibial artery origin with proximal reconstitution without distal stenoses. Normal caliber peroneal artery. Tandem severe stenosis of the distal posterior tibial artery.  2.  Left leg: No inflow obstruction. Focal calcified severe stenosis of the proximal superficial femoral artery. Long segment occlusion of the mid to distal popliteal artery with low attenuation changes which may represent thrombus or soft   atherosclerotic plaque. Reconstitution of the origins of the posterior tibial arteries. Severe tandem stenoses of the distal posterior tibial artery.  3.  Moderate amount of free fluid. Pneumoperitoneum. This is likely secondary to the peritoneal dialysis catheter.  4.  Bilateral pleural effusions, greater on the right.  5.  4 mm hyperenhancing nodule within the lower pole the left kidney. This may represent a small primary renal cell carcinoma.     ------------------------- ENCOUNTER LABS ----------------------------------------------------------------  Recent Labs   Lab 10/18/24  1303 10/18/24  0805 10/18/24  0649 10/17/24  0745 10/17/24  0653 10/16/24  1737 10/16/24  1543 10/16/24  0743 10/16/24  0642 10/15/24  2020 10/15/24  1317   WBC  --   --   --   --   --   --    --   --  5.3  --  7.5   HGB  --   --   --   --   --   --   --   --  10.9*  --  12.5*   MCV  --   --   --   --   --   --   --   --  102*  --  99   PLT  --   --   --   --   --   --   --   --  271  --  299   INR  --   --  2.77*  --  3.74*  --   --   --  4.41*   < >  --    NA  --   --  133*  --  134*  --  132*  --  133*   < >  --    POTASSIUM  --   --  4.6  --  5.0  --  5.7*  --  6.0*   < >  --    CHLORIDE  --   --  95*  --  96*  --  95*  --  96*   < >  --    CO2  --   --  25  --  25  --  24  --  24   < >  --    BUN  --   --  53.6*  --  59.7*  --  65.3*  --  63.9*   < >  --    CR  --   --  9.36*  --  9.73*  --  10.72*  --  10.34*   < >  --    ANIONGAP  --   --  13  --  13  --  13  --  13   < >  --    TERENCE  --   --  8.4*  --  8.2*  --  8.9  --  9.1   < >  --    GLC 95 196* 251*   < > 205*   < > 94   < > 96   < >  --    ALBUMIN  --   --  2.2*  --  2.1*  --   --   --   --   --   --     < > = values in this interval not displayed.       Most Recent 3 CBC's:  Recent Labs   Lab Test 10/16/24  0642 10/15/24  1317 07/11/24  0659   WBC 5.3 7.5 5.7   HGB 10.9* 12.5* 11.3*   * 99 99    299 224     Most Recent 3 BMP's:  Recent Labs   Lab Test 10/18/24  1303 10/18/24  0805 10/18/24  0649 10/17/24  0745 10/17/24  0653 10/16/24  1737 10/16/24  1543   NA  --   --  133*  --  134*  --  132*   POTASSIUM  --   --  4.6  --  5.0  --  5.7*   CHLORIDE  --   --  95*  --  96*  --  95*   CO2  --   --  25  --  25  --  24   BUN  --   --  53.6*  --  59.7*  --  65.3*   CR  --   --  9.36*  --  9.73*  --  10.72*   ANIONGAP  --   --  13  --  13  --  13   TERENCE  --   --  8.4*  --  8.2*  --  8.9   GLC 95 196* 251*   < > 205*   < > 94    < > = values in this interval not displayed.     Most Recent 2 LFT's:No lab results found.  Most Recent 3 INR's:  Recent Labs   Lab Test 10/18/24  0649 10/17/24  0653 10/16/24  0642   INR 2.77* 3.74* 4.41*     Most Recent 3 Troponin's:No lab results found.  Most Recent 3 BNP's:No lab results found.  Most Recent  D-dimer:No lab results found.  Most Recent Cholesterol Panel:No lab results found.  Most Recent 6 Bacteria Isolates From Any Culture (See EPIC Reports for Culture Details):No lab results found.  Most Recent TSH and T4:No lab results found.  Most Recent 6 glucoses:  Recent Labs   Lab Test 10/18/24  1303 10/18/24  0805 10/18/24  0649 10/18/24  0144 10/17/24  2119 10/17/24  1758   GLC 95 196* 251* 251* 170* 96     Most Recent Urinalysis:No lab results found.  Most Recent ABG:No lab results found.  Most Recent ESR & CRP:  Recent Labs   Lab Test 10/15/24  1317   SED 86*   CRPI 55.81*

## 2024-10-18 NOTE — PROGRESS NOTES
Care Management Follow Up    Length of Stay (days): 3    Expected Discharge Date: 10/23/2024     Concerns to be Addressed: discharge planning  wants PCP f/up scheduled and need to check if [facility] can change to smaller PD bags  Patient plan of care discussed at interdisciplinary rounds: Yes    Anticipated Discharge Disposition: Dialysis Services, Home     Anticipated Discharge Services: Other (see comment) (Patient is planning to hire a private duty PCA)  Anticipated Discharge DME: None    Patient/family educated on Medicare website which has current facility and service quality ratings: NA   Education Provided on the Discharge Plan: Other (see comments) (ongoing)  Patient/Family in Agreement with the Plan: yes    Referrals Placed by CM/SW: Internal Clinic Care Coordination, Specialty Providers, Scheduled Follow-up appointments  Private pay costs discussed: Not applicable    Discussed  Partnership in Safe Discharge Planning  document with patient/family: Yes: Patient     Handoff Completed: No, handoff not indicated or clinically appropriate    Additional Information:  Awaiting decision from Vascular and Podiatry concerning treatment for left worsening infection with Podiatry noting Left foot with distal hallux gangrene and osteomyelitis to the distal phalanx.  Planning had been to transition home with hopes that [facility] dialysis,  [place] PD division would work with patient to change his current 6L PD bags to a 3L PD system on his cycler, due to the 6L bags are becoming to heavy for patient to manage.  CC will need to work with patient on appointment scheduling prior to discharge.     Next Steps:   Await plans for intervention  Care Management is following.    Aminata Munoz, RN  Inpatient Care Management  [phone]

## 2024-10-18 NOTE — PROGRESS NOTES
Cycler set up per protocol and per treatment orders      Results from previous treatment:  Effluent color and clarity: clear yellow with no fibrin tissue and small blood-tinge  Total UF: 328ml  Initial Drain: 171ml  Avg Dwell: 1:18  Lost Dwell: 0:24       Cycler Serial Number: [id number]  Total Treatment Volume: 10,000 mL  Total treatment time: 9 hrs  Fill Volume: 2,000 ml  Last Fill Volume: 0 ml  Heater ba,000 mL;  Lot #: [id number];  Expiration Date: 2025  Side Bag #1:  6,000 mL;  Lot #: [id number] ;  Expiration Date: 2025       Number of cycles including final fill: 5  Dwell Time: 1:22 minutes  Last Fill Volume: 0ml  Initial Drain Alarm: 10ml  PD orders reviewed with Patient procedure and ESRD teaching done and questions answered.  Cycler ready for hook-up.    Report given to: MARYSOL Paz consent form signed: 10/18/2024

## 2024-10-19 ENCOUNTER — APPOINTMENT (OUTPATIENT)
Dept: ULTRASOUND IMAGING | Facility: CLINIC | Age: 64
DRG: 252 | End: 2024-10-19
Attending: SURGERY
Payer: MEDICARE

## 2024-10-19 LAB
ALBUMIN SERPL BCG-MCNC: 2.2 G/DL (ref 3.5–5.2)
ANION GAP SERPL CALCULATED.3IONS-SCNC: 11 MMOL/L (ref 7–15)
BUN SERPL-MCNC: 51.3 MG/DL (ref 8–23)
CALCIUM SERPL-MCNC: 7.9 MG/DL (ref 8.8–10.4)
CHLORIDE SERPL-SCNC: 97 MMOL/L (ref 98–107)
CREAT SERPL-MCNC: 9.04 MG/DL (ref 0.67–1.17)
EGFRCR SERPLBLD CKD-EPI 2021: 6 ML/MIN/1.73M2
GLUCOSE BLDC GLUCOMTR-MCNC: 137 MG/DL (ref 70–99)
GLUCOSE BLDC GLUCOMTR-MCNC: 140 MG/DL (ref 70–99)
GLUCOSE BLDC GLUCOMTR-MCNC: 146 MG/DL (ref 70–99)
GLUCOSE BLDC GLUCOMTR-MCNC: 150 MG/DL (ref 70–99)
GLUCOSE BLDC GLUCOMTR-MCNC: 252 MG/DL (ref 70–99)
GLUCOSE SERPL-MCNC: 152 MG/DL (ref 70–99)
HCO3 SERPL-SCNC: 27 MMOL/L (ref 22–29)
INR PPP: 2.16 (ref 0.85–1.15)
PHOSPHATE SERPL-MCNC: 6.4 MG/DL (ref 2.5–4.5)
POTASSIUM SERPL-SCNC: 4.7 MMOL/L (ref 3.4–5.3)
SODIUM SERPL-SCNC: 135 MMOL/L (ref 135–145)

## 2024-10-19 PROCEDURE — 80069 RENAL FUNCTION PANEL: CPT | Performed by: STUDENT IN AN ORGANIZED HEALTH CARE EDUCATION/TRAINING PROGRAM

## 2024-10-19 PROCEDURE — 120N000001 HC R&B MED SURG/OB

## 2024-10-19 PROCEDURE — 250N000011 HC RX IP 250 OP 636: Performed by: INTERNAL MEDICINE

## 2024-10-19 PROCEDURE — 93971 EXTREMITY STUDY: CPT | Mod: LT

## 2024-10-19 PROCEDURE — 250N000013 HC RX MED GY IP 250 OP 250 PS 637: Performed by: INTERNAL MEDICINE

## 2024-10-19 PROCEDURE — 99232 SBSQ HOSP IP/OBS MODERATE 35: CPT | Performed by: STUDENT IN AN ORGANIZED HEALTH CARE EDUCATION/TRAINING PROGRAM

## 2024-10-19 PROCEDURE — 90945 DIALYSIS ONE EVALUATION: CPT | Performed by: INTERNAL MEDICINE

## 2024-10-19 PROCEDURE — 99231 SBSQ HOSP IP/OBS SF/LOW 25: CPT | Performed by: SURGERY

## 2024-10-19 PROCEDURE — 85610 PROTHROMBIN TIME: CPT | Performed by: INTERNAL MEDICINE

## 2024-10-19 PROCEDURE — 250N000013 HC RX MED GY IP 250 OP 250 PS 637: Performed by: STUDENT IN AN ORGANIZED HEALTH CARE EDUCATION/TRAINING PROGRAM

## 2024-10-19 PROCEDURE — 36415 COLL VENOUS BLD VENIPUNCTURE: CPT | Performed by: STUDENT IN AN ORGANIZED HEALTH CARE EDUCATION/TRAINING PROGRAM

## 2024-10-19 RX ORDER — ACETAMINOPHEN 500 MG
1000 TABLET ORAL EVERY 8 HOURS PRN
Status: DISCONTINUED | OUTPATIENT
Start: 2024-10-19 | End: 2024-10-25

## 2024-10-19 RX ORDER — ASPIRIN 81 MG/1
81 TABLET ORAL DAILY
Status: DISCONTINUED | OUTPATIENT
Start: 2024-10-19 | End: 2024-10-29

## 2024-10-19 RX ADMIN — INSULIN ASPART 1 UNITS: 100 INJECTION, SOLUTION INTRAVENOUS; SUBCUTANEOUS at 17:16

## 2024-10-19 RX ADMIN — PIPERACILLIN AND TAZOBACTAM 2.25 G: 2; .25 INJECTION, POWDER, FOR SOLUTION INTRAVENOUS at 20:43

## 2024-10-19 RX ADMIN — CARVEDILOL 6.25 MG: 6.25 TABLET, FILM COATED ORAL at 21:48

## 2024-10-19 RX ADMIN — PANTOPRAZOLE SODIUM 40 MG: 40 TABLET, DELAYED RELEASE ORAL at 08:56

## 2024-10-19 RX ADMIN — SEVELAMER CARBONATE 800 MG: 800 TABLET, FILM COATED ORAL at 13:20

## 2024-10-19 RX ADMIN — ACETAMINOPHEN AND CODEINE PHOSPHATE 1 TABLET: 300; 30 TABLET ORAL at 13:43

## 2024-10-19 RX ADMIN — ACETAMINOPHEN 1000 MG: 500 TABLET, FILM COATED ORAL at 08:56

## 2024-10-19 RX ADMIN — INSULIN ASPART 1 UNITS: 100 INJECTION, SOLUTION INTRAVENOUS; SUBCUTANEOUS at 10:09

## 2024-10-19 RX ADMIN — SEVELAMER CARBONATE 800 MG: 800 TABLET, FILM COATED ORAL at 08:57

## 2024-10-19 RX ADMIN — PIPERACILLIN AND TAZOBACTAM 2.25 G: 2; .25 INJECTION, POWDER, FOR SOLUTION INTRAVENOUS at 05:10

## 2024-10-19 RX ADMIN — ASPIRIN 81 MG: 81 TABLET, COATED ORAL at 14:28

## 2024-10-19 RX ADMIN — SEVELAMER CARBONATE 800 MG: 800 TABLET, FILM COATED ORAL at 17:18

## 2024-10-19 RX ADMIN — ACETAMINOPHEN 1000 MG: 500 TABLET, FILM COATED ORAL at 21:48

## 2024-10-19 RX ADMIN — PANTOPRAZOLE SODIUM 40 MG: 40 TABLET, DELAYED RELEASE ORAL at 20:44

## 2024-10-19 RX ADMIN — CALCITRIOL CAPSULES 0.25 MCG 0.25 MCG: 0.25 CAPSULE ORAL at 21:48

## 2024-10-19 RX ADMIN — ALLOPURINOL 200 MG: 100 TABLET ORAL at 20:43

## 2024-10-19 RX ADMIN — ATORVASTATIN CALCIUM 40 MG: 40 TABLET, FILM COATED ORAL at 21:48

## 2024-10-19 RX ADMIN — GENTAMICIN SULFATE: 1 CREAM TOPICAL at 22:08

## 2024-10-19 RX ADMIN — PIPERACILLIN AND TAZOBACTAM 2.25 G: 2; .25 INJECTION, POWDER, FOR SOLUTION INTRAVENOUS at 13:20

## 2024-10-19 ASSESSMENT — ACTIVITIES OF DAILY LIVING (ADL)
ADLS_ACUITY_SCORE: 32
ADLS_ACUITY_SCORE: 34
ADLS_ACUITY_SCORE: 34
ADLS_ACUITY_SCORE: 32
ADLS_ACUITY_SCORE: 34
ADLS_ACUITY_SCORE: 32
ADLS_ACUITY_SCORE: 32
ADLS_ACUITY_SCORE: 34
ADLS_ACUITY_SCORE: 32
ADLS_ACUITY_SCORE: 34
ADLS_ACUITY_SCORE: 32
ADLS_ACUITY_SCORE: 34
ADLS_ACUITY_SCORE: 34
ADLS_ACUITY_SCORE: 32
ADLS_ACUITY_SCORE: 32
ADLS_ACUITY_SCORE: 34
ADLS_ACUITY_SCORE: 34
ADLS_ACUITY_SCORE: 32
ADLS_ACUITY_SCORE: 34
ADLS_ACUITY_SCORE: 34

## 2024-10-19 NOTE — PROGRESS NOTES
Cycler set up per protocol and per treatment orders     Results from previous treatment:  Effluent color and clarity: clear yellow with no fibrin  Total UF: 278  Initial Drain: 166  Avg Dwell: 1:04  Lost Dwell: 1:30    Cycler Serial Number: [id number]  Total Treatment Volume: [id number]  Total treatment time: 9 hrs  Fill Volume: 2000ml  Last Fill Volume:0ml  Heater ba.5% 6000mL;  Lot #: [id number];  Expiration Date:   Side Bag #1: 2.5% 6000mL;  Lot #: [id number];  Expiration Date: 2024  Dwell Time: 1:22 minutes  Initial Drain Alarm: 0ml  PD orders reviewed with Patient procedure and ESRD teaching done and questions answered.  Cycler ready for hook-up.    Report given to: Lana Callaway RN consent form signed yes

## 2024-10-19 NOTE — PROGRESS NOTES
[facility]    Medicine Progress Note - Hospitalist Service    Date of Admission:  10/15/2024  Date of Service: 10/19/2024    Assessment & Plan     Arnulfo Pritchett is a 63 year old male with ESRD on peritoneal dialysis, CAD, A fib, PAD who   Mr. Arnulfo Pritchett is a 62 yo male with Afib on warfarin, GERD, gout, insomnia, ESRD on PD, HFrEF, HTN, IDDM, diabetic peripheral neuropathy, CAD s/p stent placement x 3 (last 2021), TALI, HLD, and RLE PAD s/p balloon angio and stenting of SFA/popliteal arteries presented to [facility] ED with worsening pain and redness in left toe with streaking up the leg  for the last 2 days with throbbing pain.  He was not soaking his toe as instructed, he denies any fevers.  He was seen at [facility] ER and started on Zosyn and for further vascular work up in setting of recent ABIs with no flow.  Patient had an acute right lower extremity arterial occlusion and underwent SFA popliteal balloon angioplasty and stenting emergently on 5/6/2024. He has been managed postprocedure on Plavix and Coumadin.  INR is therapeutic and his BG have been stable and not elevated.      ## Worsening L great toe wound    ## Hx of acute RLE arterial occlusion s/p SFA popliteal balloon angioplasty and stenting, 5/6/2024  Presented to OSH with 2 day history of erythema and streaking redness going up his leg with associated increase pain and throbbing noted.  He has no fever but both CRP 55 and ESR 86 are elevated. XR of left foot show:   Soft tissue swelling great toe. There is subtle cortical  irregularity and probable erosive change along the distal phalanx/tuft  of the great toe which does raise concern for osteomyelitis.     Plan:   Obtain MRI foot ->  Distal phalanx of the first digit appears pointed distally with replacement of normal fatty marrow and a soft tissue defect at the distal tuft. About 14 mm of the distal phalanx appear normal with normal fat signal and without significant  edema. Constellation of findings favors gangrene.  Podiatry consultation -> will need at least a partial left hallux amputation, once/if vascular status is optimized.   Vascular surgery consultation -> obtain LLE arterial duplex with probable LLE angiogram based off examination, likely will be in the coming days.   IR consulted -> CTA abdomen/pelvis runoff today for further evaluation of disease and to guide procedure planning.  Continue Zosyn  hold warfarin and plavix in anticipation for possible surgical intervention and allow INR to drift down.  Consider IV heparin when INR < 2   Patient is not a candidate for attempted endovascular thrombectomy since high likelihood of embolization. Vascular surgery evaluating possibility of a bypass.  Left GSV mapping pending.     ## Chronic paroxysmal afib s/p atrial ablation, 6/2023  ## HFrEF 2/2 ICM  ## HLD  ## HTN  ## Hx of CAD s/p ALESIA placement (last 2021)  PTA now warfarin and Plavix instead of apixaban as above, as well as Entresto, Coreg, torsemide and statin.  Most recent EF last month on 6/4 with severely decrease LV function with est EF 20-25%.    Noted blood pressure soft 90/70s -> BPs now normalized  Decrease Coreg from 12.5 mg BID to 6.25 mg BID  Holding Torsemide  Continue to hold Entresto  Resume statin       ## ESRD on PD 2/2 DM nephropathy  ## Secondary hyperparathyroidism  ## Hx of RCC s/p R nephrectomy, 5/2022  ## Hyperkalemia  PTA on nightly PD of which he has been tolerating. Access RLQ double cuffed coiled PD catheter placed 4/16/2021.   - Nephrology consulted for PD planning  - Continue RRT medications below  - Continue calcitriol, cinacalcet and sevelamer carbonate  - Continue every other day gentamicin cream to PD cath side  - Telemetry for Hyperkalemia 5.7 -> now wnl -> tele stopped  - [facility] for hyperK    ## DM type II  ## Diabetic neuropathy  Mod cho diet  Medium intensity sliding scale insulin  Last A1c 5.9 7/9/24       # Other chronic, stable  medical conditions:  # Hx of gout: Continue PTA allopurinol  # TALI: Intolerant to nasal CPAP   # GERD: Continue daily PPI  # Insomnia: Continue PTA melatonin.          Diet: 2 Gram Sodium Diet    DVT Prophylaxis: Pneumatic Compression Devices  Rosario Catheter: Not present  Lines: None     Cardiac Monitoring: None  Code Status: Full Code      Clinically Significant Risk Factors         # Hyponatremia: Lowest Na = 133 mmol/L in last 2 days, will monitor as appropriate  # Hypochloremia: Lowest Cl = 95 mmol/L in last 2 days, will monitor as appropriate      # Hypoalbuminemia: Lowest albumin = 2.1 g/dL at 10/17/2024  6:53 AM, will monitor as appropriate       # Hypertension: Noted on problem list               # Financial/Environmental Concerns: none         Disposition Plan     Medically Ready for Discharge: Anticipated in 2-4 Days             Benjie Collins MD  Hospitalist Service  [facility]  Securely message with [employer] (more info)  Text page via [employer] Paging/Directory   ______________________________________________________________________    Interval History     No acute events overnight  No fevers or chills  No CP/SOB  No nausea / vomiting or abdominal pain  No LLE pain  No new complaints today  It is his bday    Physical Exam   Vital Signs: Temp: 98.4  F (36.9  C) Temp src: Oral BP: (!) 130/91 Pulse: 89   Resp: 18 SpO2: 95 % O2 Device: None (Room air)    Weight: 176 lbs 0 oz    General Appearance:  NA  Respiratory: CTABL  Cardiovascular: RRR with no m / r / g  GI: Soft, PD catheter site noted, non tender  Skin: warm and dry  Other:  Left toe tip is blackened, dry gangrene.  ----------------------------------------------------------------------------------------    Medical Decision Making       35 MINUTES SPENT BY ME on the date of service doing chart review, history, exam, documentation & further activities per the note.      Data   ------------------------- PAST 24 HR DATA REVIEWED  -----------------------------------------------    I have personally reviewed the following data over the past 24 hrs:    N/A  \   N/A   / N/A     135 97 (L) 51.3 (H) /  137 (H)   4.7 27 9.04 (H) \     ALT: N/A AST: N/A AP: N/A TBILI: N/A   ALB: 2.2 (L) TOT PROTEIN: N/A LIPASE: N/A     INR:  2.16 (H) PTT:  N/A   D-dimer:  N/A Fibrinogen:  N/A       Imaging results reviewed over the past 24 hrs:   No results found for this or any previous visit (from the past 24 hour(s)).    ------------------------- ENCOUNTER LABS ----------------------------------------------------------------  Recent Labs   Lab 10/19/24  1232 10/19/24  0924 10/19/24  0909 10/18/24  0805 10/18/24  0649 10/17/24  0745 10/17/24  0653 10/16/24  0743 10/16/24  0642 10/15/24  2020 10/15/24  1317   WBC  --   --   --   --   --   --   --   --  5.3  --  7.5   HGB  --   --   --   --   --   --   --   --  10.9*  --  12.5*   MCV  --   --   --   --   --   --   --   --  102*  --  99   PLT  --   --   --   --   --   --   --   --  271  --  299   INR  --  2.16*  --   --  2.77*  --  3.74*  --  4.41*   < >  --    NA  --  135  --   --  133*  --  134*   < > 133*  --   --    POTASSIUM  --  4.7  --   --  4.6  --  5.0   < > 6.0*  --   --    CHLORIDE  --  97*  --   --  95*  --  96*   < > 96*  --   --    CO2  --  27  --   --  25  --  25   < > 24  --   --    BUN  --  51.3*  --   --  53.6*  --  59.7*   < > 63.9*  --   --    CR  --  9.04*  --   --  9.36*  --  9.73*   < > 10.34*   < >  --    ANIONGAP  --  11  --   --  13  --  13   < > 13  --   --    TERENCE  --  7.9*  --   --  8.4*  --  8.2*   < > 9.1  --   --    * 152* 150*   < > 251*   < > 205*   < > 96   < >  --    ALBUMIN  --  2.2*  --   --  2.2*  --  2.1*   < >  --   --   --     < > = values in this interval not displayed.       Most Recent 3 CBC's:  Recent Labs   Lab Test 10/16/24  0642 10/15/24  1317 07/11/24  0659   WBC 5.3 7.5 5.7   HGB 10.9* 12.5* 11.3*   * 99 99    299 224     Most Recent 3  BMP's:  Recent Labs   Lab Test 10/19/24  1232 10/19/24  0924 10/19/24  0909 10/18/24  0805 10/18/24  0649 10/17/24  0745 10/17/24  0653   NA  --  135  --   --  133*  --  134*   POTASSIUM  --  4.7  --   --  4.6  --  5.0   CHLORIDE  --  97*  --   --  95*  --  96*   CO2  --  27  --   --  25  --  25   BUN  --  51.3*  --   --  53.6*  --  59.7*   CR  --  9.04*  --   --  9.36*  --  9.73*   ANIONGAP  --  11  --   --  13  --  13   TERENCE  --  7.9*  --   --  8.4*  --  8.2*   * 152* 150*   < > 251*   < > 205*    < > = values in this interval not displayed.     Most Recent 2 LFT's:No lab results found.  Most Recent 3 INR's:  Recent Labs   Lab Test 10/19/24  0924 10/18/24  0649 10/17/24  0653   INR 2.16* 2.77* 3.74*     Most Recent 3 Troponin's:No lab results found.  Most Recent 3 BNP's:No lab results found.  Most Recent D-dimer:No lab results found.  Most Recent Cholesterol Panel:No lab results found.  Most Recent 6 Bacteria Isolates From Any Culture (See EPIC Reports for Culture Details):No lab results found.  Most Recent TSH and T4:No lab results found.  Most Recent 6 glucoses:  Recent Labs   Lab Test 10/19/24  1232 10/19/24  0924 10/19/24  0909 10/19/24  0212 10/18/24  2114 10/18/24  1749   * 152* 150* 252* 199* 131*     Most Recent Urinalysis:No lab results found.  Most Recent ABG:No lab results found.  Most Recent ESR & CRP:  Recent Labs   Lab Test 10/15/24  1317   SED 86*   CRPI 55.81*

## 2024-10-19 NOTE — PROGRESS NOTES
Vascular Surgery Progress Note    Patient seen this afternoon eating dinner, in NAD.   Denies rest pain in foot, notes that foot pain worsens when he hangs foot off of bed.     Exam:   Afebrile, HR 80s, BP 140s  Awake, alert, nad  LLE with dark red skin changes to wound with dry black gangrenous changes to tip of hallux.     CTA reviewed:   Pt with occlusion of mid -distal  popliteal artery with what appears to be reconstitution of flow at trifurcation. Mild disease of SFA as well. Unable to visualize tibials well.     A/P:   Pleaseant 63yom w/ CLTI of LLE with dry gangrenous changes with occlusion of popliteal artery mid knee - he would benefit from revascularization, his bilateral femoral arteries have circumferential calcification making groin access challenging. Will discuss alternate access options and case planning with staff. Would plan to be more aggressive endovascularly in this gentleman with an EF of 25% w/ ESRD on peritoneal dialysis.     - Plan pending discussion with staff, likely endovascular intervention next week.   - continue high intensity statin, patient should also be on asa 81 daily.     Kaiser Gamble MD  Vascular Surgery PGY4  To reach vascular surgery [place] team on call, please go to [facility] and from the drop down find the following:   VASCULAR SURGERY/[facility]  Then page the person listed to the right of day/night call. Can click the pager to page.

## 2024-10-19 NOTE — PLAN OF CARE
Goal Outcome Evaluation:      Plan of Care Reviewed With: patient               Summary: Worsening L great toe wound, and pain, ESRD on PD    DATE & TIME:10/17/24-10/18/24 0207-8110  Cognitive Concerns/ Orientation : A&Ox4   BEHAVIOR & AGGRESSION TOOL COLOR: Green  ABNL VS/O2:  VSS on RA  MOBILITY: SBA w/ cane  PAIN MANAGMENT: Denies pain  DIET: 2 g Na diet  BOWEL/BLADDER: Continent, using urinal in bed  ABNL LAB/BG:  INR 2.77, Na 133, cr 9.36, GFR 6 phos 6.3  DRAIN/DEVICES: LLQ PD cycle ended ,R PIV SL x2  SKIN: LLE toe black, red/luis streaking extending up foot. Dry/flaky heels/feet. Scattered bruising.  TESTS/PROCEDURES: angiogram aborted yesterday d/t severity of atherosclerosis, CT abdomen/pelvis completed  D/C DATE: Pending work up, dc to home.  OTHER IMPORTANT INFO: Podiatry, vascular, nephrology, orthotics.

## 2024-10-19 NOTE — PLAN OF CARE
Goal Outcome Evaluation:         Summary: Worsening L great toe wound, and pain, ESRD on PD    DATE & TIME:10/17/24-10/18/24 4748-1265  Cognitive Concerns/ Orientation : A&Ox4   BEHAVIOR & AGGRESSION TOOL COLOR: Green  ABNL VS/O2:  VSS on RA  MOBILITY: SBA w/ quad cane  PAIN MANAGMENT: Gave scheduled tylenol for 3/10 LLE pain  DIET: 2 g Na diet  BOWEL/BLADDER: Continent, using urinal in bed  ABNL LAB/BG:  INR 3.74, Na 134, cr 9.73, GFR 5, phos 6.8  DRAIN/DEVICES: LLQ PD cycle started at 2025, x2 R PIV SL   SKIN: LLE toe black, red/luis streaking extending up foot. Dry/flaky heels/feet. Scattered bruising.  TESTS/PROCEDURES: angiogram aborted today d/t severity of atherosclerosis, CT abdomen/pelvis completed  D/C DATE: Pending work up, dc to home.  OTHER IMPORTANT INFO: Podiatry, vascular, nephrology, orthotics.

## 2024-10-19 NOTE — PROGRESS NOTES
Assessment and Plan:   ESRD: on dialysis.  PD tonight: 2.5% dianeal. 5 X 2000 ml XC, no last fill.     Taking calcitriol, Sensipar po. Also on Renvela.     Today Ca 7.9, phos 6.4. Alb 2.2.             Interval History:   LE PVD: followed by Vasc Surg.  CLTI of LLE with dry gangrenous changes with occlusion of popliteal artery mid knee. On Zosyn.       Afib: now on coumadin and plavix.     CHF:  Entresto, torsemide both on hold. On coreg.     DM               Review of Systems:   Dialysis going well. Denies abd pain or drainage problems.           Medications:     Current Facility-Administered Medications   Medication Dose Route Frequency Provider Last Rate Last Admin    acetaminophen (TYLENOL) tablet 1,000 mg  1,000 mg Oral BID Chai España MD   1,000 mg at 10/19/24 0856    allopurinol (ZYLOPRIM) tablet 200 mg  200 mg Oral QPM Chai España MD   200 mg at 10/18/24 2120    atorvastatin (LIPITOR) tablet 40 mg  40 mg Oral At Bedtime Chai España MD   40 mg at 10/18/24 2120    calcitRIOL (ROCALTROL) capsule 0.25 mcg  0.25 mcg Oral At Bedtime Chai España MD   0.25 mcg at 10/18/24 2120    carvedilol (COREG) tablet 6.25 mg  6.25 mg Oral At Bedtime Chai España MD   6.25 mg at 10/18/24 2119    cinacalcet (SENSIPAR) tablet 60 mg  60 mg Oral Once per day on Monday Wednesday Friday Chai España MD   60 mg at 10/18/24 0844    insulin aspart (NovoLOG) injection (RAPID ACTING)  1-7 Units Subcutaneous TID AC Chai España MD   1 Units at 10/19/24 1009    insulin aspart (NovoLOG) injection (RAPID ACTING)  1-5 Units Subcutaneous At Bedtime Chai España MD   2 Units at 10/16/24 2229    pantoprazole (PROTONIX) EC tablet 40 mg  40 mg Oral BID Chai España MD   40 mg at 10/19/24 0856    piperacillin-tazobactam (ZOSYN) 2.25 g vial to attach to  ml bag  2.25 g Intravenous Q8H Chai España MD   2.25 g at 10/19/24 0510    [Held by provider]  sacubitril-valsartan (ENTRESTO) 49-51 MG per tablet 1 tablet  1 tablet Oral BID Chai España MD   1 tablet at 10/16/24 0923    sevelamer carbonate (RENVELA) tablet 800 mg  800 mg Oral TID w/meals Chai España MD   800 mg at 10/19/24 0857    sodium chloride (PF) 0.9% PF flush 3 mL  3 mL Intracatheter Q8H Chai España MD   3 mL at 10/19/24 0510    [Held by provider] torsemide (DEMADEX) tablet 100 mg  100 mg Oral QAM Chai España MD        [Held by provider] Warfarin Dose Required Daily - Pharmacist Managed  1 each Oral See Admin Instructions Chai España MD         Current Facility-Administered Medications   Medication Dose Route Frequency Provider Last Rate Last Admin    dianeal PD LOW calcium-2.5% dex (calcium 2.5 mEq/L) 6,000 mL PERITONEAL DIALYSATE   Dialysis [facility] Supplied PD Star Acuna MD        dianeal PD LOW calcium-2.5% dex (calcium 2.5 mEq/L) 6,000 mL PERITONEAL DIALYSATE   Dialysis [facility] Supplied PD Star Acuna MD        Patient is already receiving anticoagulation with heparin, enoxaparin (LOVENOX), warfarin (COUMADIN)  or other anticoagulant medication   Does not apply Continuous PRN Chai España MD         Current active medications and PTA medications reviewed, see medication list for details.            Physical Exam:   Vitals were reviewed  Patient Vitals for the past 24 hrs:   BP Temp Temp src Pulse Resp SpO2   10/19/24 0740 (!) 130/91 98.4  F (36.9  C) Oral 89 18 95 %   10/19/24 0050 119/86 98.5  F (36.9  C) Oral 91 18 95 %   10/18/24 2115 (!) 144/98 98.3  F (36.8  C) Oral 96 18 95 %   10/18/24 1531 (!) 141/90 98.6  F (37  C) Oral 86 18 97 %       Temp:  [98.3  F (36.8  C)-98.6  F (37  C)] 98.4  F (36.9  C)  Pulse:  [86-96] 89  Resp:  [18] 18  BP: (119-144)/(86-98) 130/91  SpO2:  [95 %-97 %] 95 %    Temperatures:  Current - Temp: 98.4  F (36.9  C); Max - Temp  Av.5  F (36.9  C)  Min: 98.3  F (36.8  C)  Max: 98.6  F (37  C)  Respiration  "range: Resp  Av  Min: 18  Max: 18  Pulse range: Pulse  Av.5  Min: 86  Max: 96  Blood pressure range: Systolic (24hrs), Av , Min:119 , Max:144   ; Diastolic (24hrs), Av, Min:86, Max:98    Pulse oximetry range: SpO2  Av.5 %  Min: 95 %  Max: 97 %    I/O last 3 completed shifts:  In: 660 [P.O.:660]  Out: 700 [Urine:700]      Intake/Output Summary (Last 24 hours) at 10/19/2024 1238  Last data filed at 10/19/2024 0900  Gross per 24 hour   Intake 780 ml   Output 700 ml   Net 80 ml     Alert and responsive  No response         Wt Readings from Last 4 Encounters:   10/18/24 79.1 kg (174 lb 6.1 oz)   10/15/24 78.6 kg (173 lb 4.5 oz)   10/08/24 78.7 kg (173 lb 8 oz)   10/07/24 80.7 kg (178 lb)          Data:          Lab Results   Component Value Date     10/19/2024     10/18/2024     10/17/2024    Lab Results   Component Value Date    CHLORIDE 97 10/19/2024    CHLORIDE 95 10/18/2024    CHLORIDE 96 10/17/2024    Lab Results   Component Value Date    BUN 51.3 10/19/2024    BUN 53.6 10/18/2024    BUN 59.7 10/17/2024      Lab Results   Component Value Date    POTASSIUM 4.7 10/19/2024    POTASSIUM 4.6 10/18/2024    POTASSIUM 5.0 10/17/2024    Lab Results   Component Value Date    CO2 27 10/19/2024    CO2 25 10/18/2024    CO2 25 10/17/2024    Lab Results   Component Value Date    CR 9.04 10/19/2024    CR 9.36 10/18/2024    CR 9.73 10/17/2024        Recent Labs   Lab Test 10/16/24  0642 10/15/24  1317 24  0659   WBC 5.3 7.5 5.7   HGB 10.9* 12.5* 11.3*   HCT 33.4* 35.8* 33.2*   * 99 99    299 224     No results for input(s): \"AST\", \"ALT\", \"GGT\", \"ALKPHOS\", \"BILITOTAL\", \"BILICONJ\", \"BILIDIRECT\", \"SHELTON\" in the last 15186 hours.    Invalid input(s): \"BILIRUBININDIRECT\"    No results for input(s): \"MAG\" in the last 25687 hours.  Recent Labs   Lab Test 10/19/24  0924 10/18/24  0649 10/17/24  0653   PHOS 6.4* 6.3* 6.8*     Recent Labs   Lab Test 10/19/24  0924 10/18/24  0649 " "10/17/24  0653   TERENCE 7.9* 8.4* 8.2*       Lab Results   Component Value Date    TERENCE 7.9 (L) 10/19/2024     Lab Results   Component Value Date    WBC 5.3 10/16/2024    HGB 10.9 (L) 10/16/2024    HCT 33.4 (L) 10/16/2024     (H) 10/16/2024     10/16/2024     Lab Results   Component Value Date     10/19/2024    POTASSIUM 4.7 10/19/2024    CHLORIDE 97 (L) 10/19/2024    CO2 27 10/19/2024     (H) 10/19/2024     Lab Results   Component Value Date    BUN 51.3 (H) 10/19/2024    CR 9.04 (H) 10/19/2024     No results found for: \"MAG\"  Lab Results   Component Value Date    PHOS 6.4 (H) 10/19/2024       Creatinine   Date Value Ref Range Status   10/19/2024 9.04 (H) 0.67 - 1.17 mg/dL Final   10/18/2024 9.36 (H) 0.67 - 1.17 mg/dL Final   10/17/2024 9.73 (H) 0.67 - 1.17 mg/dL Final   10/16/2024 10.72 (H) 0.67 - 1.17 mg/dL Final   10/16/2024 10.34 (H) 0.67 - 1.17 mg/dL Final   10/15/2024 9.84 (H) 0.67 - 1.17 mg/dL Final       Attestation:  I have reviewed today's vital signs, notes, medications, labs and imaging.  PD orders reviewed and written. Discussed with dialysis nurse.      Jimbo Foreman MD            "

## 2024-10-19 NOTE — PROGRESS NOTES
VASCULAR SURGERY    Patient is very comfortable today and feels that his left foot pain is improving.    Undergoing nightly cycler peritoneal dialysis which has been working well.    No change in his left leg exam.    Reviewed CTA images and discussed with Dr. Gray.  Patient is not a candidate for attempted endovascular thrombectomy since high likelihood of embolization.    Will look at the possibility of a bypass.  Left GSV mapping will be performed.    Discussed with the patient       Patrick Hein MD

## 2024-10-19 NOTE — PLAN OF CARE
DATE & TIME: 10/19/24 5640-0975  Cognitive Concerns/ Orientation: A&Ox4   BEHAVIOR & AGGRESSION TOOL COLOR: Green  ABNL VS/O2:  VSS on RA  MOBILITY: SBA w/ quad cane  PAIN MANAGMENT: LLE pain managed with ramiro. tylenol and PRN tylenol#3 given x1.  DIET: 2g Na diet  BOWEL/BLADDER: Continent. LBM 10/18  ABNL LAB/BG:  & 137. INR 2.16. Cr 9.04, GFR 6, Phos 6.4  DRAIN/DEVICES: LLQ PD, R PIVx2 SL w/ zosyn Q8H.  SKIN: LLE toe dry scab gangrenous, red/luis streaking extending up foot. Dry/flaky heels/feet. Scattered bruising.  TESTS/PROCEDURES: LLE US done today.  D/C DATE: TBD.   OTHER IMPORTANT INFO: Nightly cycler peritoneal dialysis. Podiatry, vascular, nephrology following. Per vascular surgery, pt might need bypass and left GSV mapping will be performed.

## 2024-10-20 LAB
ALBUMIN SERPL BCG-MCNC: 2.1 G/DL (ref 3.5–5.2)
ANION GAP SERPL CALCULATED.3IONS-SCNC: 12 MMOL/L (ref 7–15)
BUN SERPL-MCNC: 48.7 MG/DL (ref 8–23)
CALCIUM SERPL-MCNC: 8.2 MG/DL (ref 8.8–10.4)
CHLORIDE SERPL-SCNC: 98 MMOL/L (ref 98–107)
CREAT SERPL-MCNC: 8.75 MG/DL (ref 0.67–1.17)
EGFRCR SERPLBLD CKD-EPI 2021: 6 ML/MIN/1.73M2
ERYTHROCYTE [DISTWIDTH] IN BLOOD BY AUTOMATED COUNT: 14.2 % (ref 10–15)
GLUCOSE BLDC GLUCOMTR-MCNC: 160 MG/DL (ref 70–99)
GLUCOSE BLDC GLUCOMTR-MCNC: 175 MG/DL (ref 70–99)
GLUCOSE BLDC GLUCOMTR-MCNC: 189 MG/DL (ref 70–99)
GLUCOSE BLDC GLUCOMTR-MCNC: 192 MG/DL (ref 70–99)
GLUCOSE BLDC GLUCOMTR-MCNC: 253 MG/DL (ref 70–99)
GLUCOSE SERPL-MCNC: 213 MG/DL (ref 70–99)
HCO3 SERPL-SCNC: 25 MMOL/L (ref 22–29)
HCT VFR BLD AUTO: 31.2 % (ref 40–53)
HGB BLD-MCNC: 10.4 G/DL (ref 13.3–17.7)
HOLD SPECIMEN: NORMAL
INR PPP: 1.7 (ref 0.85–1.15)
MCH RBC QN AUTO: 33.9 PG (ref 26.5–33)
MCHC RBC AUTO-ENTMCNC: 33.3 G/DL (ref 31.5–36.5)
MCV RBC AUTO: 102 FL (ref 78–100)
PHOSPHATE SERPL-MCNC: 6.3 MG/DL (ref 2.5–4.5)
PLATELET # BLD AUTO: 274 10E3/UL (ref 150–450)
POTASSIUM SERPL-SCNC: 4.3 MMOL/L (ref 3.4–5.3)
RBC # BLD AUTO: 3.07 10E6/UL (ref 4.4–5.9)
SODIUM SERPL-SCNC: 135 MMOL/L (ref 135–145)
UFH PPP CHRO-ACNC: 0.97 IU/ML
WBC # BLD AUTO: 6.3 10E3/UL (ref 4–11)

## 2024-10-20 PROCEDURE — 250N000011 HC RX IP 250 OP 636: Performed by: INTERNAL MEDICINE

## 2024-10-20 PROCEDURE — 99232 SBSQ HOSP IP/OBS MODERATE 35: CPT | Performed by: STUDENT IN AN ORGANIZED HEALTH CARE EDUCATION/TRAINING PROGRAM

## 2024-10-20 PROCEDURE — 99231 SBSQ HOSP IP/OBS SF/LOW 25: CPT | Performed by: SURGERY

## 2024-10-20 PROCEDURE — 85610 PROTHROMBIN TIME: CPT | Performed by: INTERNAL MEDICINE

## 2024-10-20 PROCEDURE — 250N000011 HC RX IP 250 OP 636: Performed by: STUDENT IN AN ORGANIZED HEALTH CARE EDUCATION/TRAINING PROGRAM

## 2024-10-20 PROCEDURE — 36415 COLL VENOUS BLD VENIPUNCTURE: CPT | Performed by: INTERNAL MEDICINE

## 2024-10-20 PROCEDURE — 120N000001 HC R&B MED SURG/OB

## 2024-10-20 PROCEDURE — 80069 RENAL FUNCTION PANEL: CPT | Performed by: STUDENT IN AN ORGANIZED HEALTH CARE EDUCATION/TRAINING PROGRAM

## 2024-10-20 PROCEDURE — 250N000013 HC RX MED GY IP 250 OP 250 PS 637: Performed by: STUDENT IN AN ORGANIZED HEALTH CARE EDUCATION/TRAINING PROGRAM

## 2024-10-20 PROCEDURE — 85027 COMPLETE CBC AUTOMATED: CPT | Performed by: STUDENT IN AN ORGANIZED HEALTH CARE EDUCATION/TRAINING PROGRAM

## 2024-10-20 PROCEDURE — 90945 DIALYSIS ONE EVALUATION: CPT

## 2024-10-20 PROCEDURE — 90945 DIALYSIS ONE EVALUATION: CPT | Performed by: INTERNAL MEDICINE

## 2024-10-20 PROCEDURE — 36415 COLL VENOUS BLD VENIPUNCTURE: CPT | Performed by: STUDENT IN AN ORGANIZED HEALTH CARE EDUCATION/TRAINING PROGRAM

## 2024-10-20 PROCEDURE — 85520 HEPARIN ASSAY: CPT | Performed by: STUDENT IN AN ORGANIZED HEALTH CARE EDUCATION/TRAINING PROGRAM

## 2024-10-20 PROCEDURE — 250N000013 HC RX MED GY IP 250 OP 250 PS 637: Performed by: INTERNAL MEDICINE

## 2024-10-20 RX ORDER — POLYETHYLENE GLYCOL 3350 17 G/17G
17 POWDER, FOR SOLUTION ORAL DAILY
Status: DISCONTINUED | OUTPATIENT
Start: 2024-10-20 | End: 2024-10-27

## 2024-10-20 RX ORDER — HEPARIN SODIUM 10000 [USP'U]/100ML
0-5000 INJECTION, SOLUTION INTRAVENOUS CONTINUOUS
Status: DISCONTINUED | OUTPATIENT
Start: 2024-10-20 | End: 2024-10-26

## 2024-10-20 RX ADMIN — ASPIRIN 81 MG: 81 TABLET, COATED ORAL at 09:46

## 2024-10-20 RX ADMIN — PIPERACILLIN AND TAZOBACTAM 2.25 G: 2; .25 INJECTION, POWDER, FOR SOLUTION INTRAVENOUS at 20:53

## 2024-10-20 RX ADMIN — INSULIN ASPART 1 UNITS: 100 INJECTION, SOLUTION INTRAVENOUS; SUBCUTANEOUS at 18:05

## 2024-10-20 RX ADMIN — CARVEDILOL 6.25 MG: 6.25 TABLET, FILM COATED ORAL at 20:59

## 2024-10-20 RX ADMIN — ALLOPURINOL 200 MG: 100 TABLET ORAL at 20:58

## 2024-10-20 RX ADMIN — PANTOPRAZOLE SODIUM 40 MG: 40 TABLET, DELAYED RELEASE ORAL at 20:54

## 2024-10-20 RX ADMIN — ATORVASTATIN CALCIUM 40 MG: 40 TABLET, FILM COATED ORAL at 20:59

## 2024-10-20 RX ADMIN — POLYETHYLENE GLYCOL 3350 17 G: 17 POWDER, FOR SOLUTION ORAL at 12:24

## 2024-10-20 RX ADMIN — ONDANSETRON 4 MG: 2 INJECTION INTRAMUSCULAR; INTRAVENOUS at 07:57

## 2024-10-20 RX ADMIN — PIPERACILLIN AND TAZOBACTAM 2.25 G: 2; .25 INJECTION, POWDER, FOR SOLUTION INTRAVENOUS at 05:36

## 2024-10-20 RX ADMIN — INSULIN ASPART 1 UNITS: 100 INJECTION, SOLUTION INTRAVENOUS; SUBCUTANEOUS at 12:38

## 2024-10-20 RX ADMIN — CALCITRIOL CAPSULES 0.25 MCG 0.25 MCG: 0.25 CAPSULE ORAL at 20:58

## 2024-10-20 RX ADMIN — SEVELAMER CARBONATE 800 MG: 800 TABLET, FILM COATED ORAL at 09:46

## 2024-10-20 RX ADMIN — ACETAMINOPHEN 1000 MG: 500 TABLET, FILM COATED ORAL at 09:46

## 2024-10-20 RX ADMIN — SEVELAMER CARBONATE 800 MG: 800 TABLET, FILM COATED ORAL at 12:24

## 2024-10-20 RX ADMIN — INSULIN ASPART 1 UNITS: 100 INJECTION, SOLUTION INTRAVENOUS; SUBCUTANEOUS at 09:49

## 2024-10-20 RX ADMIN — ACETAMINOPHEN 1000 MG: 500 TABLET, FILM COATED ORAL at 20:53

## 2024-10-20 RX ADMIN — SACUBITRIL AND VALSARTAN 1 TABLET: 49; 51 TABLET, FILM COATED ORAL at 21:02

## 2024-10-20 RX ADMIN — PIPERACILLIN AND TAZOBACTAM 2.25 G: 2; .25 INJECTION, POWDER, FOR SOLUTION INTRAVENOUS at 13:43

## 2024-10-20 RX ADMIN — SEVELAMER CARBONATE 800 MG: 800 TABLET, FILM COATED ORAL at 18:05

## 2024-10-20 RX ADMIN — PANTOPRAZOLE SODIUM 40 MG: 40 TABLET, DELAYED RELEASE ORAL at 09:46

## 2024-10-20 RX ADMIN — HEPARIN SODIUM 1450 UNITS/HR: 10000 INJECTION, SOLUTION INTRAVENOUS at 12:37

## 2024-10-20 ASSESSMENT — ACTIVITIES OF DAILY LIVING (ADL)
ADLS_ACUITY_SCORE: 31
ADLS_ACUITY_SCORE: 34
ADLS_ACUITY_SCORE: 31
ADLS_ACUITY_SCORE: 34
ADLS_ACUITY_SCORE: 31
ADLS_ACUITY_SCORE: 31
ADLS_ACUITY_SCORE: 34
ADLS_ACUITY_SCORE: 31
ADLS_ACUITY_SCORE: 34
ADLS_ACUITY_SCORE: 34
ADLS_ACUITY_SCORE: 31
ADLS_ACUITY_SCORE: 34
ADLS_ACUITY_SCORE: 32
ADLS_ACUITY_SCORE: 31
ADLS_ACUITY_SCORE: 34
ADLS_ACUITY_SCORE: 31
ADLS_ACUITY_SCORE: 34
ADLS_ACUITY_SCORE: 34
ADLS_ACUITY_SCORE: 32
ADLS_ACUITY_SCORE: 34
ADLS_ACUITY_SCORE: 34
ADLS_ACUITY_SCORE: 31
ADLS_ACUITY_SCORE: 32

## 2024-10-20 NOTE — PLAN OF CARE
Goal Outcome Evaluation:         Summary: Worsening L great toe wound, and pain, ESRD on PD    DATE & TIME: 10/19/24-10/20/24 3938-5828  Cognitive Concerns/ Orientation: A&Ox4. Calm, cooperative, and pleasant.   BEHAVIOR & AGGRESSION TOOL COLOR: Green  ABNL VS/O2:  VSS on RA  MOBILITY: SBA w/ quad cane. Leg elevated on pillows.   PAIN MANAGMENT: Denies this shift-on scheduled tylenol for LLE pain   DIET: 2g Na diet. Good appetite  BOWEL/BLADDER: Continent. Using urinal at bedside. PD   ABNL LAB/BG: . INR 2.16. Cr 9.04, GFR 6, Phos 6.4  DRAIN/DEVICES: LLQ PD started at 2218 , R PIVx2 SL w/ zosyn Q8H.  SKIN: LLE toe dry scab gangrenous, red/luis streaking extending up foot. Dry/flaky heels/feet. Scattered bruising.  TESTS/PROCEDURES: LLE US done 10/19.  D/C DATE: pending.   OTHER IMPORTANT INFO: Nightly cycler peritoneal dialysis. Podiatry, vascular, nephrology, orthotics following. Per vascular surgery, pt might need bypass and left GSV mapping completed yesterday 10/19

## 2024-10-20 NOTE — PROGRESS NOTES
Podiatry / Foot and Ankle Surgery Progress Note    October 20, 2024    Patient was seen at bedside.  Foot exam unchanged and stable from initial consult.  -Left foot with distal hallux gangrene. Reviewed MRI findings supporting this and osteomyelitis to the distal phalanx.    Vascular surgery plans for left femoral to below-knee popliteal in situ bypass graft with embolectomy to be done this week.    Continue to keep foot clean and dry.  Will follow from periphery for now.  Plan for surgical intervention after Vascular Surgery has completed their intervention.      Mary Kate Brizuela DPM  9:21 AM

## 2024-10-20 NOTE — PROGRESS NOTES
[facility]    Medicine Progress Note - Hospitalist Service    Date of Admission:  10/15/2024  Date of Service: 10/20/2024    Assessment & Plan     Arnulfo Pritchett is a 63 year old male with ESRD on peritoneal dialysis, CAD, A fib, PAD who   Mr. Arnulfo Pritchett is a 62 yo male with Afib on warfarin, GERD, gout, insomnia, ESRD on PD, HFrEF, HTN, IDDM, diabetic peripheral neuropathy, CAD s/p stent placement x 3 (last 2021), TALI, HLD, and RLE PAD s/p balloon angio and stenting of SFA/popliteal arteries presented to [facility] ED with worsening pain and redness in left toe with streaking up the leg  for the last 2 days with throbbing pain.  He was not soaking his toe as instructed, he denies any fevers.  He was seen at [facility] ER and started on Zosyn and for further vascular work up in setting of recent ABIs with no flow.  Patient had an acute right lower extremity arterial occlusion and underwent SFA popliteal balloon angioplasty and stenting emergently on 5/6/2024. He has been managed postprocedure on Plavix and Coumadin.  INR is therapeutic and his BG have been stable and not elevated.      ## Worsening L great toe wound    ## Hx of acute RLE arterial occlusion s/p SFA popliteal balloon angioplasty and stenting, 5/6/2024  Presented to OSH with 2 day history of erythema and streaking redness going up his leg with associated increase pain and throbbing noted.  He has no fever but both CRP 55 and ESR 86 are elevated. XR of left foot show:   Soft tissue swelling great toe. There is subtle cortical  irregularity and probable erosive change along the distal phalanx/tuft  of the great toe which does raise concern for osteomyelitis.     Plan:   Obtained MRI foot ->  Distal phalanx of the first digit appears pointed distally with replacement of normal fatty marrow and a soft tissue defect at the distal tuft. About 14 mm of the distal phalanx appear normal with normal fat signal and without significant  edema. Constellation of findings favors gangrene.  Podiatry consultation -> will need at least a partial left hallux amputation, once/if vascular status is optimized.   Vascular surgery consultation -> obtain LLE arterial duplex with probable LLE angiogram based off examination, likely will be in the coming days.   IR consulted -> CTA abdomen/pelvis runoff today for further evaluation of disease and to guide procedure planning.  Continue Zosyn  hold warfarin and plavix in anticipation for possible surgical intervention and allow INR to drift down.  Consider IV heparin when INR < 2 -> now on IV Heparin   Patient is not a candidate for attempted endovascular thrombectomy since high likelihood of embolization. Vascular surgery evaluating possibility of a bypass -> likely will be performed on 10/22/2024.      ## Chronic paroxysmal afib s/p atrial ablation, 6/2023  ## HFrEF 2/2 ICM  ## HLD  ## HTN  ## Hx of CAD s/p ALESIA placement (last 2021)  PTA now warfarin and Plavix instead of apixaban as above, as well as Entresto, Coreg, torsemide and statin.  Most recent EF last month on 6/4 with severely decrease LV function with est EF 20-25%.    Noted blood pressure soft 90/70s -> BPs now normalized  Decrease Coreg from 12.5 mg BID to 6.25 mg BID  Holding Torsemide  resume Entresto  Resume statin       ## ESRD on PD 2/2 DM nephropathy  ## Secondary hyperparathyroidism  ## Hx of RCC s/p R nephrectomy, 5/2022  ## Hyperkalemia  PTA on nightly PD of which he has been tolerating. Access RLQ double cuffed coiled PD catheter placed 4/16/2021.   - Nephrology consulted for PD planning  - Continue RRT medications below  - Continue calcitriol, cinacalcet and sevelamer carbonate  - Continue every other day gentamicin cream to PD cath side  - Telemetry for Hyperkalemia 5.7 -> now wnl -> tele stopped  - [facility] for hyperK    ## DM type II  ## Diabetic neuropathy  Mod cho diet  Medium intensity sliding scale insulin  Last A1c 5.9 7/9/24        # Other chronic, stable medical conditions:  # Hx of gout: Continue PTA allopurinol  # TALI: Intolerant to nasal CPAP   # GERD: Continue daily PPI  # Insomnia: Continue PTA melatonin.          Diet: 2 Gram Sodium Diet    DVT Prophylaxis: Pneumatic Compression Devices  Rosario Catheter: Not present  Lines: None     Cardiac Monitoring: None  Code Status: Full Code      Clinically Significant Risk Factors          # Hypochloremia: Lowest Cl = 97 mmol/L in last 2 days, will monitor as appropriate      # Hypoalbuminemia: Lowest albumin = 2.1 g/dL at 10/20/2024  8:39 AM, will monitor as appropriate       # Hypertension: Noted on problem list               # Financial/Environmental Concerns: none         Disposition Plan     Medically Ready for Discharge: Anticipated in 2-4 Days             Benjie Collins MD  Hospitalist Service  [facility]  Securely message with [employer] (more info)  Text page via [employer] Paging/Directory   ______________________________________________________________________    Interval History     No acute events overnight  No fevers or chills  No CP/SOB  No nausea / vomiting or abdominal pain  Reports some constipation  No LLE pain  No other complaints today  Resting comfortably watching the [employer] game    Physical Exam   Vital Signs: Temp: 98.3  F (36.8  C) Temp src: Oral BP: 121/83 Pulse: 91   Resp: 18 SpO2: 94 % O2 Device: None (Room air)    Weight: 179 lbs 0 oz    General Appearance:  no apparent distress  Respiratory: CTABL  Cardiovascular: RRR with no m / r / g  GI: Soft, PD catheter site noted, non tender  Skin: warm and dry  Other:  Left toe tip is blackened, dry gangrene.  ----------------------------------------------------------------------------------------    Medical Decision Making       25 MINUTES SPENT BY ME on the date of service doing chart review, history, exam, documentation & further activities per the note.      Data   ------------------------- PAST 24 HR DATA  REVIEWED -----------------------------------------------    I have personally reviewed the following data over the past 24 hrs:    6.3  \   10.4 (L)   / 274     135 98 48.7 (H) /  175 (H)   4.3 25 8.75 (H) \     ALT: N/A AST: N/A AP: N/A TBILI: N/A   ALB: 2.1 (L) TOT PROTEIN: N/A LIPASE: N/A     INR:  1.70 (H) PTT:  N/A   D-dimer:  N/A Fibrinogen:  N/A       Imaging results reviewed over the past 24 hrs:   No results found for this or any previous visit (from the past 24 hour(s)).    ------------------------- ENCOUNTER LABS ----------------------------------------------------------------  Recent Labs   Lab 10/20/24  1206 10/20/24  0909 10/20/24  0839 10/19/24  1232 10/19/24  0924 10/18/24  0805 10/18/24  0649 10/16/24  0743 10/16/24  0642 10/15/24  2020 10/15/24  1317   WBC  --   --  6.3  --   --   --   --   --  5.3  --  7.5   HGB  --   --  10.4*  --   --   --   --   --  10.9*  --  12.5*   MCV  --   --  102*  --   --   --   --   --  102*  --  99   PLT  --   --  274  --   --   --   --   --  271  --  299   INR  --   --  1.70*  --  2.16*  --  2.77*   < > 4.41*   < >  --    NA  --   --  135  --  135  --  133*   < > 133*  --   --    POTASSIUM  --   --  4.3  --  4.7  --  4.6   < > 6.0*  --   --    CHLORIDE  --   --  98  --  97*  --  95*   < > 96*  --   --    CO2  --   --  25  --  27  --  25   < > 24  --   --    BUN  --   --  48.7*  --  51.3*  --  53.6*   < > 63.9*  --   --    CR  --   --  8.75*  --  9.04*  --  9.36*   < > 10.34*   < >  --    ANIONGAP  --   --  12  --  11  --  13   < > 13  --   --    TERENCE  --   --  8.2*  --  7.9*  --  8.4*   < > 9.1  --   --    * 189* 213*   < > 152*   < > 251*   < > 96   < >  --    ALBUMIN  --   --  2.1*  --  2.2*  --  2.2*   < >  --   --   --     < > = values in this interval not displayed.       Most Recent 3 CBC's:  Recent Labs   Lab Test 10/20/24  0839 10/16/24  0642 10/15/24  1317   WBC 6.3 5.3 7.5   HGB 10.4* 10.9* 12.5*   * 102* 99    271 299     Most Recent 3  BMP's:  Recent Labs   Lab Test 10/20/24  1206 10/20/24  0909 10/20/24  0839 10/19/24  1232 10/19/24  0924 10/18/24  0805 10/18/24  0649   NA  --   --  135  --  135  --  133*   POTASSIUM  --   --  4.3  --  4.7  --  4.6   CHLORIDE  --   --  98  --  97*  --  95*   CO2  --   --  25  --  27  --  25   BUN  --   --  48.7*  --  51.3*  --  53.6*   CR  --   --  8.75*  --  9.04*  --  9.36*   ANIONGAP  --   --  12  --  11  --  13   TERENCE  --   --  8.2*  --  7.9*  --  8.4*   * 189* 213*   < > 152*   < > 251*    < > = values in this interval not displayed.     Most Recent 2 LFT's:No lab results found.  Most Recent 3 INR's:  Recent Labs   Lab Test 10/20/24  0839 10/19/24  0924 10/18/24  0649   INR 1.70* 2.16* 2.77*     Most Recent 3 Troponin's:No lab results found.  Most Recent 3 BNP's:No lab results found.  Most Recent D-dimer:No lab results found.  Most Recent Cholesterol Panel:No lab results found.  Most Recent 6 Bacteria Isolates From Any Culture (See EPIC Reports for Culture Details):No lab results found.  Most Recent TSH and T4:No lab results found.  Most Recent 6 glucoses:  Recent Labs   Lab Test 10/20/24  1206 10/20/24  0909 10/20/24  0839 10/20/24  0203 10/19/24  2108 10/19/24  1701   * 189* 213* 253* 140* 146*     Most Recent Urinalysis:No lab results found.  Most Recent ABG:No lab results found.  Most Recent ESR & CRP:  Recent Labs   Lab Test 10/15/24  1317   SED 86*   CRPI 55.81*

## 2024-10-20 NOTE — PROGRESS NOTES
Cycler set up per protocol and per treatment orders     Results from previous treatment:  Effluent color and clarity: clear, yellow  Total UF: -626ml  Initial Drain: 68ml  Avg Dwell: 1:16  Lost Dwell: 30min    Cycler Serial Number: [id number]  Total Treatment Volume: 10,000mL  Total treatment time: 9 hrs  Fill Volume: 2000ml  Last Fill Volume:0ml  Heater ba.5% 6000mL;  Lot #: [id number];  Expiration Date: 2025  Side Bag #1: 2.5% 6000mL;  Lot #: [id number];  Expiration Date: 2025  Number of cycles: 5  Dwell Time: 1:22 minutes  Initial Drain Alarm: 0ml  PD orders reviewed with Patient procedure and ESRD teaching done and questions answered.  Cycler ready for hook-up.    Report given to: MARYSOL Moctezuma consent form signed.

## 2024-10-20 NOTE — PLAN OF CARE
Summary: Worsening L great toe wound, and pain, ESRD on PD    DATE & TIME: 10/19/24 Evening   Cognitive Concerns/ Orientation: A&Ox4. Calm, cooperative, and pleasant.   BEHAVIOR & AGGRESSION TOOL COLOR: Green  ABNL VS/O2:  VSS on RA  MOBILITY: SBA w/ quad cane. Leg elevated on pillows.   PAIN MANAGMENT: LLE pain managed with scheduled Tylenol.   DIET: 2g Na diet. Good appetite  BOWEL/BLADDER: Continent. Using bathroom. PD   ABNL LAB/BG: BGL. INR 2.16. Cr 9.04, GFR 6, Phos 6.4  DRAIN/DEVICES: LLQ PD, R PIVx2 SL w/ zosyn Q8H.  SKIN: LLE toe dry scab gangrenous, red/luis streaking extending up foot. Dry/flaky heels/feet. Scattered bruising.  TESTS/PROCEDURES: LLE US done today.  D/C DATE: TBD.   OTHER IMPORTANT INFO: Nightly cycler peritoneal dialysis. Podiatry, vascular, nephrology, orthotics following. Per vascular surgery, pt might need bypass and left GSV mapping will be performed.

## 2024-10-20 NOTE — PROGRESS NOTES
Vascular SURGERY    Very good day yesterday.  Foot pain is better.  Nightly peritoneal dialysis is going well.    No change in exam.    GSV is an adequate conduit.    Planning for left femoral to below-knee popliteal in situ bypass graft with embolectomy.  Will look at timing when OR schedule is open tomorrow but likely will be performed on 10/22/2024.  This will be finalized tomorrow and discussed with patient.       Patrick Hein MD

## 2024-10-20 NOTE — PROGRESS NOTES
Assessment and Plan:     ESRD on dialysis: PD tonight. Orders reviewed.   Lytes nominal, Cr 8.75. Phos 6.3, Ca 8.2. Alb 2.1.     On calcitriol, cinacalcet, Renvela.             Interval History:   PVD: gangrenous toe. On Zosyn. IV heparin. Vascular surgery planned on Tuesday.     DM: on insulin.           CHF: on Entresto and coreg. Has afib.            Review of Systems:   Dialysis going well. No SOB. Eating well.           Medications:     Current Facility-Administered Medications   Medication Dose Route Frequency Provider Last Rate Last Admin    acetaminophen (TYLENOL) tablet 1,000 mg  1,000 mg Oral BID Chai España MD   1,000 mg at 10/20/24 0946    allopurinol (ZYLOPRIM) tablet 200 mg  200 mg Oral QPM Chai España MD   200 mg at 10/19/24 2043    aspirin EC tablet 81 mg  81 mg Oral Daily Benjie Collins MD   81 mg at 10/20/24 0946    atorvastatin (LIPITOR) tablet 40 mg  40 mg Oral At Bedtime Chai España MD   40 mg at 10/19/24 2148    calcitRIOL (ROCALTROL) capsule 0.25 mcg  0.25 mcg Oral At Bedtime Chai España MD   0.25 mcg at 10/19/24 2148    carvedilol (COREG) tablet 6.25 mg  6.25 mg Oral At Bedtime Chai España MD   6.25 mg at 10/19/24 2148    cinacalcet (SENSIPAR) tablet 60 mg  60 mg Oral Once per day on Monday Wednesday Friday Chai España MD   60 mg at 10/18/24 0844    heparin ANTICOAGULANT Loading dose for HIGH INTENSITY TREATMENT * Give BEFORE starting heparin infusion  80 Units/kg Intravenous Once Benjie Collins MD        insulin aspart (NovoLOG) injection (RAPID ACTING)  1-7 Units Subcutaneous TID AC Chai España MD   1 Units at 10/20/24 0949    insulin aspart (NovoLOG) injection (RAPID ACTING)  1-5 Units Subcutaneous At Bedtime Chai España MD   2 Units at 10/16/24 2229    pantoprazole (PROTONIX) EC tablet 40 mg  40 mg Oral BID Chai España MD   40 mg at 10/20/24 0946    piperacillin-tazobactam (ZOSYN) 2.25 g vial to attach to   ml bag  2.25 g Intravenous Q8H Chai España MD   2.25 g at 10/20/24 0536    polyethylene glycol (MIRALAX) Packet 17 g  17 g Oral Daily Benjie Collins MD        sacubitril-valsartan (ENTRESTO) 49-51 MG per tablet 1 tablet  1 tablet Oral BID Benjie Collins MD   1 tablet at 10/16/24 0923    sevelamer carbonate (RENVELA) tablet 800 mg  800 mg Oral TID w/meals Chai España MD   800 mg at 10/20/24 0946    sodium chloride (PF) 0.9% PF flush 3 mL  3 mL Intracatheter Q8H Chai España MD   3 mL at 10/20/24 0536     Current Facility-Administered Medications   Medication Dose Route Frequency Provider Last Rate Last Admin    dianeal PD LOW calcium-2.5% dex (calcium 2.5 mEq/L) 6,000 mL PERITONEAL DIALYSATE   Dialysis [facility] Supplied PD Star Acuna MD        dianeal PD LOW calcium-2.5% dex (calcium 2.5 mEq/L) 6,000 mL PERITONEAL DIALYSATE   Dialysis [facility] Supplied PD Star Acuna MD        heparin 25,000 units in 0.45% NaCl 250 mL ANTICOAGULANT infusion  0-5,000 Units/hr Intravenous Continuous Benjie Collins MD        Patient is already receiving anticoagulation with heparin, enoxaparin (LOVENOX), warfarin (COUMADIN)  or other anticoagulant medication   Does not apply Continuous PRN Chai España MD         Current active medications and PTA medications reviewed, see medication list for details.            Physical Exam:   Vitals were reviewed  Patient Vitals for the past 24 hrs:   BP Temp Temp src Pulse Resp SpO2 Weight   10/20/24 0956 -- -- -- -- -- -- 81.2 kg (179 lb)   10/20/24 0722 121/83 98.3  F (36.8  C) Oral 91 18 94 % --   10/20/24 0204 102/74 98.6  F (37  C) Oral 100 18 93 % --   10/19/24 2146 (!) 133/90 98.5  F (36.9  C) Oral 93 18 97 % --   10/19/24 1540 (!) 130/90 98.1  F (36.7  C) Oral 91 18 98 % --   10/19/24 1225 -- -- -- -- -- -- 79.8 kg (176 lb)       Temp:  [98.1  F (36.7  C)-98.6  F (37  C)] 98.3  F (36.8  C)  Pulse:  [] 91  Resp:  [18] 18  BP: (102-133)/(74-90)  "121/83  SpO2:  [93 %-98 %] 94 %    Temperatures:  Current - Temp: 98.3  F (36.8  C); Max - Temp  Av.4  F (36.9  C)  Min: 98.1  F (36.7  C)  Max: 98.6  F (37  C)  Respiration range: Resp  Av  Min: 18  Max: 18  Pulse range: Pulse  Av.8  Min: 91  Max: 100  Blood pressure range: Systolic (24hrs), Av , Min:102 , Max:133   ; Diastolic (24hrs), Av, Min:74, Max:90    Pulse oximetry range: SpO2  Av.5 %  Min: 93 %  Max: 98 %    I/O last 3 completed shifts:  In: 840 [P.O.:840]  Out: -       Intake/Output Summary (Last 24 hours) at 10/20/2024 1049  Last data filed at 10/20/2024 0948  Gross per 24 hour   Intake 900 ml   Output 200 ml   Net 700 ml       Alert and responsive, resting in bed  Drainage bag from last night clear yellow       Wt Readings from Last 4 Encounters:   10/20/24 81.2 kg (179 lb)   10/15/24 78.6 kg (173 lb 4.5 oz)   10/08/24 78.7 kg (173 lb 8 oz)   10/07/24 80.7 kg (178 lb)          Data:          Lab Results   Component Value Date     10/20/2024     10/19/2024     10/18/2024    Lab Results   Component Value Date    CHLORIDE 98 10/20/2024    CHLORIDE 97 10/19/2024    CHLORIDE 95 10/18/2024    Lab Results   Component Value Date    BUN 48.7 10/20/2024    BUN 51.3 10/19/2024    BUN 53.6 10/18/2024      Lab Results   Component Value Date    POTASSIUM 4.3 10/20/2024    POTASSIUM 4.7 10/19/2024    POTASSIUM 4.6 10/18/2024    Lab Results   Component Value Date    CO2 25 10/20/2024    CO2 27 10/19/2024    CO2 25 10/18/2024    Lab Results   Component Value Date    CR 8.75 10/20/2024    CR 9.04 10/19/2024    CR 9.36 10/18/2024        Recent Labs   Lab Test 10/20/24  0839 10/16/24  0642 10/15/24  1317   WBC 6.3 5.3 7.5   HGB 10.4* 10.9* 12.5*   HCT 31.2* 33.4* 35.8*   * 102* 99    271 299     No results for input(s): \"AST\", \"ALT\", \"GGT\", \"ALKPHOS\", \"BILITOTAL\", \"BILICONJ\", \"BILIDIRECT\", \"SHELTON\" in the last 15656 hours.    Invalid input(s): " "\"BILIRUBININDIRECT\"    No results for input(s): \"MAG\" in the last 85054 hours.  Recent Labs   Lab Test 10/20/24  0839 10/19/24  0924 10/18/24  0649   PHOS 6.3* 6.4* 6.3*     Recent Labs   Lab Test 10/20/24  0839 10/19/24  0924 10/18/24  0649   TERENCE 8.2* 7.9* 8.4*       Lab Results   Component Value Date    TERENCE 8.2 (L) 10/20/2024     Lab Results   Component Value Date    WBC 6.3 10/20/2024    HGB 10.4 (L) 10/20/2024    HCT 31.2 (L) 10/20/2024     (H) 10/20/2024     10/20/2024     Lab Results   Component Value Date     10/20/2024    POTASSIUM 4.3 10/20/2024    CHLORIDE 98 10/20/2024    CO2 25 10/20/2024     (H) 10/20/2024     Lab Results   Component Value Date    BUN 48.7 (H) 10/20/2024    CR 8.75 (H) 10/20/2024     No results found for: \"MAG\"  Lab Results   Component Value Date    PHOS 6.3 (H) 10/20/2024       Creatinine   Date Value Ref Range Status   10/20/2024 8.75 (H) 0.67 - 1.17 mg/dL Final   10/19/2024 9.04 (H) 0.67 - 1.17 mg/dL Final   10/18/2024 9.36 (H) 0.67 - 1.17 mg/dL Final   10/17/2024 9.73 (H) 0.67 - 1.17 mg/dL Final   10/16/2024 10.72 (H) 0.67 - 1.17 mg/dL Final   10/16/2024 10.34 (H) 0.67 - 1.17 mg/dL Final       Attestation:  I have reviewed today's vital signs, notes, medications, labs and imaging.  PD orders reviewed.      Jimbo Foreman MD            "

## 2024-10-21 LAB
ALBUMIN SERPL BCG-MCNC: 2 G/DL (ref 3.5–5.2)
ANION GAP SERPL CALCULATED.3IONS-SCNC: 12 MMOL/L (ref 7–15)
BUN SERPL-MCNC: 46 MG/DL (ref 8–23)
CALCIUM SERPL-MCNC: 8.5 MG/DL (ref 8.8–10.4)
CHLORIDE SERPL-SCNC: 97 MMOL/L (ref 98–107)
CREAT SERPL-MCNC: 8.43 MG/DL (ref 0.67–1.17)
EGFRCR SERPLBLD CKD-EPI 2021: 6 ML/MIN/1.73M2
GLUCOSE BLDC GLUCOMTR-MCNC: 131 MG/DL (ref 70–99)
GLUCOSE BLDC GLUCOMTR-MCNC: 167 MG/DL (ref 70–99)
GLUCOSE BLDC GLUCOMTR-MCNC: 210 MG/DL (ref 70–99)
GLUCOSE BLDC GLUCOMTR-MCNC: 226 MG/DL (ref 70–99)
GLUCOSE BLDC GLUCOMTR-MCNC: 284 MG/DL (ref 70–99)
GLUCOSE SERPL-MCNC: 257 MG/DL (ref 70–99)
HCO3 SERPL-SCNC: 25 MMOL/L (ref 22–29)
INR PPP: 1.43 (ref 0.85–1.15)
PHOSPHATE SERPL-MCNC: 6.2 MG/DL (ref 2.5–4.5)
POTASSIUM SERPL-SCNC: 4.2 MMOL/L (ref 3.4–5.3)
SODIUM SERPL-SCNC: 134 MMOL/L (ref 135–145)
UFH PPP CHRO-ACNC: 0.37 IU/ML
UFH PPP CHRO-ACNC: 0.4 IU/ML

## 2024-10-21 PROCEDURE — 85610 PROTHROMBIN TIME: CPT | Performed by: INTERNAL MEDICINE

## 2024-10-21 PROCEDURE — 250N000013 HC RX MED GY IP 250 OP 250 PS 637: Performed by: INTERNAL MEDICINE

## 2024-10-21 PROCEDURE — 36415 COLL VENOUS BLD VENIPUNCTURE: CPT | Performed by: INTERNAL MEDICINE

## 2024-10-21 PROCEDURE — 250N000011 HC RX IP 250 OP 636: Performed by: STUDENT IN AN ORGANIZED HEALTH CARE EDUCATION/TRAINING PROGRAM

## 2024-10-21 PROCEDURE — 250N000011 HC RX IP 250 OP 636: Performed by: INTERNAL MEDICINE

## 2024-10-21 PROCEDURE — 90945 DIALYSIS ONE EVALUATION: CPT | Performed by: INTERNAL MEDICINE

## 2024-10-21 PROCEDURE — 99232 SBSQ HOSP IP/OBS MODERATE 35: CPT | Performed by: INTERNAL MEDICINE

## 2024-10-21 PROCEDURE — 85520 HEPARIN ASSAY: CPT | Performed by: INTERNAL MEDICINE

## 2024-10-21 PROCEDURE — 120N000001 HC R&B MED SURG/OB

## 2024-10-21 PROCEDURE — 82040 ASSAY OF SERUM ALBUMIN: CPT | Performed by: STUDENT IN AN ORGANIZED HEALTH CARE EDUCATION/TRAINING PROGRAM

## 2024-10-21 PROCEDURE — 250N000013 HC RX MED GY IP 250 OP 250 PS 637: Performed by: STUDENT IN AN ORGANIZED HEALTH CARE EDUCATION/TRAINING PROGRAM

## 2024-10-21 PROCEDURE — 90945 DIALYSIS ONE EVALUATION: CPT

## 2024-10-21 RX ORDER — GENTAMICIN SULFATE 1 MG/G
CREAM TOPICAL DAILY PRN
Status: DISCONTINUED | OUTPATIENT
Start: 2024-10-21 | End: 2024-10-22

## 2024-10-21 RX ADMIN — PIPERACILLIN AND TAZOBACTAM 2.25 G: 2; .25 INJECTION, POWDER, FOR SOLUTION INTRAVENOUS at 13:24

## 2024-10-21 RX ADMIN — SEVELAMER CARBONATE 800 MG: 800 TABLET, FILM COATED ORAL at 09:56

## 2024-10-21 RX ADMIN — INSULIN ASPART 2 UNITS: 100 INJECTION, SOLUTION INTRAVENOUS; SUBCUTANEOUS at 09:28

## 2024-10-21 RX ADMIN — ATORVASTATIN CALCIUM 40 MG: 40 TABLET, FILM COATED ORAL at 21:04

## 2024-10-21 RX ADMIN — ACETAMINOPHEN 1000 MG: 500 TABLET, FILM COATED ORAL at 09:27

## 2024-10-21 RX ADMIN — INSULIN ASPART 1 UNITS: 100 INJECTION, SOLUTION INTRAVENOUS; SUBCUTANEOUS at 21:48

## 2024-10-21 RX ADMIN — SACUBITRIL AND VALSARTAN 1 TABLET: 49; 51 TABLET, FILM COATED ORAL at 09:28

## 2024-10-21 RX ADMIN — SACUBITRIL AND VALSARTAN 1 TABLET: 49; 51 TABLET, FILM COATED ORAL at 20:55

## 2024-10-21 RX ADMIN — PIPERACILLIN AND TAZOBACTAM 2.25 G: 2; .25 INJECTION, POWDER, FOR SOLUTION INTRAVENOUS at 06:26

## 2024-10-21 RX ADMIN — ACETAMINOPHEN AND CODEINE PHOSPHATE 1 HALF-TAB: 300; 30 TABLET ORAL at 22:34

## 2024-10-21 RX ADMIN — CINACALCET 60 MG: 30 TABLET ORAL at 09:28

## 2024-10-21 RX ADMIN — CARVEDILOL 6.25 MG: 6.25 TABLET, FILM COATED ORAL at 21:03

## 2024-10-21 RX ADMIN — CALCITRIOL CAPSULES 0.25 MCG 0.25 MCG: 0.25 CAPSULE ORAL at 21:03

## 2024-10-21 RX ADMIN — INSULIN ASPART 1 UNITS: 100 INJECTION, SOLUTION INTRAVENOUS; SUBCUTANEOUS at 13:40

## 2024-10-21 RX ADMIN — SEVELAMER CARBONATE 800 MG: 800 TABLET, FILM COATED ORAL at 18:43

## 2024-10-21 RX ADMIN — POLYETHYLENE GLYCOL 3350 17 G: 17 POWDER, FOR SOLUTION ORAL at 09:35

## 2024-10-21 RX ADMIN — ASPIRIN 81 MG: 81 TABLET, COATED ORAL at 09:26

## 2024-10-21 RX ADMIN — PANTOPRAZOLE SODIUM 40 MG: 40 TABLET, DELAYED RELEASE ORAL at 20:54

## 2024-10-21 RX ADMIN — PIPERACILLIN AND TAZOBACTAM 2.25 G: 2; .25 INJECTION, POWDER, FOR SOLUTION INTRAVENOUS at 20:57

## 2024-10-21 RX ADMIN — HEPARIN SODIUM 1250 UNITS/HR: 10000 INJECTION, SOLUTION INTRAVENOUS at 23:47

## 2024-10-21 RX ADMIN — HEPARIN SODIUM 1250 UNITS/HR: 10000 INJECTION, SOLUTION INTRAVENOUS at 03:10

## 2024-10-21 RX ADMIN — PANTOPRAZOLE SODIUM 40 MG: 40 TABLET, DELAYED RELEASE ORAL at 09:27

## 2024-10-21 RX ADMIN — ONDANSETRON 4 MG: 4 TABLET, ORALLY DISINTEGRATING ORAL at 09:56

## 2024-10-21 RX ADMIN — SEVELAMER CARBONATE 800 MG: 800 TABLET, FILM COATED ORAL at 13:38

## 2024-10-21 RX ADMIN — ALLOPURINOL 200 MG: 100 TABLET ORAL at 20:54

## 2024-10-21 ASSESSMENT — ACTIVITIES OF DAILY LIVING (ADL)
ADLS_ACUITY_SCORE: 31

## 2024-10-21 NOTE — PROGRESS NOTES
Vascular Surgery Progress Note    Patient seen resting comfortably in bed. No acute complaints.     Exam:   Afebrile, HR 80s-90s BP 130s systolic  Awake, alert, nad  Nlb on room air     A/P   63yo male w/ PMH ESRD on PD, CHF EF 25% with CLTI of LLE with dry gangrenous changes to L toe. Planning for LLE bypass in coming days, currently on schedule for Friday.     - Patient okay to eat  - Bypass procedure planned for Friday.  - Encourage up OOB  - Protein supplements between/with meals.     .Kaiser Gamble MD  Vascular Surgery PGY4  To reach vascular surgery [place] team on call, please go to [facility] and from the drop down find the following:   VASCULAR SURGERY/[facility]  Then page the person listed to the right of day/night call. Can click the pager to page.

## 2024-10-21 NOTE — PLAN OF CARE
Goal Outcome Evaluation:                      DATE & TIME: 10/20/24 [id number]  Cognitive Concerns/ Orientation: A/O x 4.   BEHAVIOR & AGGRESSION TOOL COLOR: Green, calm/cooperative  ABNL VS/O2:  VSS on RA  MOBILITY: SBA w/ quad cane  PAIN MANAGMENT: c/o LLE pain-on scheduled Tylenol  DIET: 2g Na diet, fair appetite  BOWEL/BLADDER: Continent, using urinal at bedside, on PD-started at 2040, no BM this shift   ABNL LAB/BG: Urea Nitrogen 48.7, Cr 8.75, Phosphorus 6.3, , Hgb 10.4, and INR 1.70.   DRAIN/DEVICES: LLQ PD site. 2 PIV. One infusing Heparin rate change from 1450 units/hr to 1250 at 2030. Next Hep Xa at 0230  SKIN: Dusky in color. LLE great toe black w/ dry scab gangrenous, red/luis streaking extending up foot and into shin. Dry/flaky heels/feet, scattered bruising.  TESTS/PROCEDURES: Vascular surgery/procedure in a couple days  D/C DATE: TBD, multiple days. After vascular intervention, pending Podiatry intervention on L great toe.   OTHER IMPORTANT INFO: Will need nightly cycler peritoneal dialysis hooked up. Podiatry, vascular and nephrology following.  Also, started Heparin infusion this afternoon.   Continue Heparin gtt/Zosyn.

## 2024-10-21 NOTE — PLAN OF CARE
Goal Outcome Evaluation:       Orientation: A&Ox4  Behavior & Aggression: green  Activity: SBA, quad cane  Diet: 2 g Na  Fall risk: yes  Isolation: N/A  Pain: denies  B&B: continent, 1x large BM;   VS/O2: VSS, RA.  IV: R PIV x2; one infusing heparin @ 1250 units/hr(at goal rate, hep 10a check tomorrow); other SL.  Drains/Devices: LLQ PD site.  Tele: N/A  Abnormal Labs: Creatinine: 8.43(peritoneal dialysis) hep Xa draw axel AM  Skin: Dusky in color. LLE great toe black w/ dry scab gangrenous, red/luis streaking extending up foot and into shin. Dry/flaky heels/feet, scattered bruising.  Consults/Procedures: vascular surgery procedure in a few days.  D/C Date: TBD  Other: LLE pedal pulse with doppler, R pedal pulse +1- history of numbness neuropathy has nightly peritoneal dialysis

## 2024-10-21 NOTE — PLAN OF CARE
Summary: Worsening L great toe wound, and pain, ESRD on PD. Vascular issue.   DATE & TIME: 10/20/24 5176-4379   Cognitive Concerns/ Orientation: A & O x 4.   BEHAVIOR & AGGRESSION TOOL COLOR: Green  ABNL VS/O2:  VSS on RA  MOBILITY: SBA w/ quad cane. Leg elevated on pillows. Up in recliner x1. Ambulating is more painful in LLE.   PAIN MANAGMENT: Scheduled Tylenol.   DIET: 2g Na diet. Fair appetite.   BOWEL/BLADDER: Continent. Using urinal at bedside. PD. Mild constipation, started Miralax.   ABNL LAB/BG: Urea Nitrogen 48.7, Cr 8.75, Phosphorus 6.3, /175, Hgb 10.4, and INR 1.70.   DRAIN/DEVICES: Q PD site. 2 PIV. One infusing Heparin @ 1450 units/hr. Other SL.   SKIN: Dusky in color. LLE great toe black w/ dry scab gangrenous, red/luis streaking extending up foot and into shin. Dry/flaky heels/feet. Scattered bruising.  TESTS/PROCEDURES: Vascular surgery/procedure in a couple days.   D/C DATE: TBD, multiple days. After vascular intervention. Podiatry wants to intervene on L great toe.   OTHER IMPORTANT INFO: Will need nightly cycler peritoneal dialysis hooked up. Podiatry, vascular and nephrology following. Stable. No complaints. Some nausea this am PRN Zofran x 1, effective. Also, started Heparin infusion this afternoon.

## 2024-10-21 NOTE — PROGRESS NOTES
[facility]    Nephrology Progress Note     Assessment & Plan     63 y.o man with ESRD and on PD     ESRD:     PD prescripton:  9 hrs  4 cylces  0852-4809 ml per cycle  Last fill of Extraneal of 1200 ml-He drains this around noon. We omit this step in hospital as we do not have Extraneal.      Severe PAD with left great toe wound    Anemia  CAD, EF of 20-25%: Seems like he is well compensated  CKD-MBD:               -Sensipar               -calcitriol    Plan:    Same PD Rx tonight.          Harish Minor MD  [facility] - Nephrology  [phone]    Interval History     He feels well.  UF + initial drain 650 mL.    Surgery tomorrow ? Or Friday ?     Physical Exam   Temp: 98.3  F (36.8  C) Temp src: Oral BP: (!) 138/91 Pulse: 88   Resp: 16 SpO2: 95 % O2 Device: None (Room air)    Vitals:    10/18/24 0608 10/19/24 1225 10/20/24 0956   Weight: 79.1 kg (174 lb 6.1 oz) 79.8 kg (176 lb) 81.2 kg (179 lb)     Vital Signs with Ranges  Temp:  [98  F (36.7  C)-98.5  F (36.9  C)] 98.3  F (36.8  C)  Pulse:  [79-93] 88  Resp:  [16-18] 16  BP: (128-138)/(78-97) 138/91  SpO2:  [95 %-98 %] 95 %  I/O last 3 completed shifts:  In: 300 [P.O.:300]  Out: 200 [Urine:200]    GENERAL APPEARANCE: pleasant, NAD, a & o  HEENT:  Eyes/ears/nose/neck grossly normal  RESP: lungs cta b c good efforts, no crackles, rhonchi or wheezes  CV: RRR, nl S1/S2, no m/r/g   ABDOMEN: o/s/nt/nd, bs present  EXTREMITIES/SKIN: no rashes/lesions; no edema    Medications   Current Facility-Administered Medications   Medication Dose Route Frequency Provider Last Rate Last Admin    dianeal PD LOW calcium-2.5% dex (calcium 2.5 mEq/L) 6,000 mL PERITONEAL DIALYSATE   Dialysis [facility] Supplied PD Star Acuna MD        dianeal PD LOW calcium-2.5% dex (calcium 2.5 mEq/L) 6,000 mL PERITONEAL DIALYSATE   Dialysis [facility] Supplied PD Star Acuna MD   New Bag at 10/20/24 2040    heparin 25,000 units in 0.45% NaCl 250 mL  ANTICOAGULANT infusion  0-5,000 Units/hr Intravenous Continuous Benjie Collins MD 12.5 mL/hr at 10/21/24 0828 1,250 Units/hr at 10/21/24 0828    Patient is already receiving anticoagulation with heparin, enoxaparin (LOVENOX), warfarin (COUMADIN)  or other anticoagulant medication   Does not apply Continuous PRN Chai España MD         Current Facility-Administered Medications   Medication Dose Route Frequency Provider Last Rate Last Admin    acetaminophen (TYLENOL) tablet 1,000 mg  1,000 mg Oral BID Chai España MD   1,000 mg at 10/21/24 0927    allopurinol (ZYLOPRIM) tablet 200 mg  200 mg Oral QPM Chai España MD   200 mg at 10/20/24 2058    aspirin EC tablet 81 mg  81 mg Oral Daily Benjie Collins MD   81 mg at 10/21/24 0926    atorvastatin (LIPITOR) tablet 40 mg  40 mg Oral At Bedtime Chai España MD   40 mg at 10/20/24 2059    calcitRIOL (ROCALTROL) capsule 0.25 mcg  0.25 mcg Oral At Bedtime Chai España MD   0.25 mcg at 10/20/24 2058    carvedilol (COREG) tablet 6.25 mg  6.25 mg Oral At Bedtime Chai España MD   6.25 mg at 10/20/24 2059    cinacalcet (SENSIPAR) tablet 60 mg  60 mg Oral Once per day on Monday Wednesday Friday Chai España MD   60 mg at 10/21/24 0928    insulin aspart (NovoLOG) injection (RAPID ACTING)  1-7 Units Subcutaneous TID AC Chai España MD   2 Units at 10/21/24 0928    insulin aspart (NovoLOG) injection (RAPID ACTING)  1-5 Units Subcutaneous At Bedtime Chai España MD   2 Units at 10/16/24 2229    pantoprazole (PROTONIX) EC tablet 40 mg  40 mg Oral BID Chai España MD   40 mg at 10/21/24 0927    piperacillin-tazobactam (ZOSYN) 2.25 g vial to attach to  ml bag  2.25 g Intravenous Q8H Chai España MD   2.25 g at 10/21/24 0626    polyethylene glycol (MIRALAX) Packet 17 g  17 g Oral Daily Benjie Collins MD   17 g at 10/21/24 0935    sacubitril-valsartan (ENTRESTO) 49-51 MG per tablet 1 tablet  1 tablet Oral BID  Benjie Collins MD   1 tablet at 10/21/24 0928    sevelamer carbonate (RENVELA) tablet 800 mg  800 mg Oral TID w/meals Chai España MD   800 mg at 10/21/24 0956    sodium chloride (PF) 0.9% PF flush 3 mL  3 mL Intracatheter Q8H Chai España MD   3 mL at 10/20/24 2054       Data   BMP  Recent Labs   Lab 10/21/24  1157 10/21/24  0801 10/21/24  0649 10/21/24  0211 10/20/24  0909 10/20/24  0839 10/19/24  1232 10/19/24  0924 10/18/24  0805 10/18/24  0649   NA  --   --  134*  --   --  135  --  135  --  133*   POTASSIUM  --   --  4.2  --   --  4.3  --  4.7  --  4.6   CHLORIDE  --   --  97*  --   --  98  --  97*  --  95*   TERENCE  --   --  8.5*  --   --  8.2*  --  7.9*  --  8.4*   CO2  --   --  25  --   --  25  --  27  --  25   BUN  --   --  46.0*  --   --  48.7*  --  51.3*  --  53.6*   CR  --   --  8.43*  --   --  8.75*  --  9.04*  --  9.36*   * 210* 257* 284*   < > 213*   < > 152*   < > 251*    < > = values in this interval not displayed.     Phos@[email or web address](phos:4)  CBC)  Recent Labs   Lab 10/20/24  0839 10/16/24  0642 10/15/24  1317   WBC 6.3 5.3 7.5   HGB 10.4* 10.9* 12.5*   HCT 31.2* 33.4* 35.8*   * 102* 99    271 299     Recent Labs   Lab 10/21/24  0649   INR 1.43*         Attestation:   I have reviewed today's relevant vital signs, notes, medications, labs and imaging.

## 2024-10-21 NOTE — PROGRESS NOTES
Cycler set up per protocol and per treatment orders      Results from previous treatment:  Effluent color and clarity: clear yellow with no fibrin tissue noted  Total UF: 402ml  Initial Drain: 248ml  Avg Dwell: 1:13  Lost Dwell: 0:47    Cycler Serial Number: [id number]   Total Treatment Volume: 10,000mL  Total treatment time: 9 hrs  Fill Volume: 2000ml  Last Fill Volume:0ml  Heater ba.5% 6000mL;  Lot #: [id number];  Expiration Date: 2026  Side Bag #1: 2.5% 6000mL;  Lot #: [id number];  Expiration Date: 2026  Cassette # [id number], exp.      Number of cycles including final fill: 5  Dwell Time: 1:22 minutes  Last Fill Volume: 0 ml  Initial Drain Alarm: 10 ml  PD orders reviewed with Patient procedure and ESRD teaching done and questions answered.  Cycler ready for hook-up.    Report given to: JASWINDER Bazzi RN  [facility] consent form signed

## 2024-10-21 NOTE — PLAN OF CARE
Date/Time: 10/20/24 3634-6865    Summary: 64 y/o M/PMH of ESRD on peritoneal dialysis, CAD, A fib, PAD who   Mr. Arnulfo Pritchett is a 64 yo male with Afib on warfarin, GERD, gout, insomnia, ESRD on PD, HFrEF, HTN, IDDM, diabetic peripheral neuropathy, CAD s/p stent placement x 3 (last 2021), TALI, HLD, and RLE PAD s/p balloon angio and stenting of SFA/popliteal arteries. Presented to [facility] ED with worsening pain and redness to L great toe and streaking up leg ongoing for 2 days.    Diagnosis: gangrene of L great toe.  Orientation: A&Ox4  Behavior & Aggression: green  Activity: SBA, quad cane  Diet: 2 g Na  Fall risk: yes  Isolation: N/A  Pain: denies  B&B: continent, no BM this shift; endorses constipation, BS absent (does have fluid in abdomen).  VS/O2: VSS, RA.  IV: R PIV x2; one infusing heparin @ 1250 units/hr; other SL.  Drains/Devices: LLQ PD site.  Tele: N/A  Abnormal Labs: hep Xa draw @ 0902.  Skin: Dusky in color. LLE great toe black w/ dry scab gangrenous, red/luis streaking extending up foot and into shin. Dry/flaky heels/feet, scattered bruising.  Consults/Procedures: vascular surgery procedure in a few days.  D/C Date: TBD  Other: has nightly peritoneal dialysis hooked up.

## 2024-10-22 ENCOUNTER — APPOINTMENT (OUTPATIENT)
Dept: NUCLEAR MEDICINE | Facility: CLINIC | Age: 64
DRG: 252 | End: 2024-10-22
Attending: PHYSICIAN ASSISTANT
Payer: MEDICARE

## 2024-10-22 LAB
ALBUMIN SERPL BCG-MCNC: 2 G/DL (ref 3.5–5.2)
ANION GAP SERPL CALCULATED.3IONS-SCNC: 12 MMOL/L (ref 7–15)
ATRIAL RATE - MUSE: 86 BPM
BUN SERPL-MCNC: 44.9 MG/DL (ref 8–23)
CALCIUM SERPL-MCNC: 7.9 MG/DL (ref 8.8–10.4)
CHLORIDE SERPL-SCNC: 97 MMOL/L (ref 98–107)
CREAT SERPL-MCNC: 8.16 MG/DL (ref 0.67–1.17)
CV STRESS MAX HR HE: 88
DIASTOLIC BLOOD PRESSURE - MUSE: NORMAL MMHG
EGFRCR SERPLBLD CKD-EPI 2021: 7 ML/MIN/1.73M2
GLUCOSE BLDC GLUCOMTR-MCNC: 136 MG/DL (ref 70–99)
GLUCOSE BLDC GLUCOMTR-MCNC: 139 MG/DL (ref 70–99)
GLUCOSE BLDC GLUCOMTR-MCNC: 149 MG/DL (ref 70–99)
GLUCOSE BLDC GLUCOMTR-MCNC: 170 MG/DL (ref 70–99)
GLUCOSE BLDC GLUCOMTR-MCNC: 229 MG/DL (ref 70–99)
GLUCOSE SERPL-MCNC: 190 MG/DL (ref 70–99)
HCO3 SERPL-SCNC: 27 MMOL/L (ref 22–29)
INR PPP: 1.32 (ref 0.85–1.15)
INTERPRETATION ECG - MUSE: NORMAL
NUC STRESS EJECTION FRACTION: 26 %
P AXIS - MUSE: 44 DEGREES
PHOSPHATE SERPL-MCNC: 5.3 MG/DL (ref 2.5–4.5)
POTASSIUM SERPL-SCNC: 4 MMOL/L (ref 3.4–5.3)
PR INTERVAL - MUSE: 176 MS
QRS DURATION - MUSE: 98 MS
QT - MUSE: 424 MS
QTC - MUSE: 507 MS
R AXIS - MUSE: -79 DEGREES
RATE PRESSURE PRODUCT: NORMAL
SODIUM SERPL-SCNC: 136 MMOL/L (ref 135–145)
STRESS ECHO BASELINE DIASTOLIC HE: 102
STRESS ECHO BASELINE HR: 79 BPM
STRESS ECHO BASELINE SYSTOLIC BP: 151
STRESS ECHO CALCULATED PERCENT HR: 57 %
STRESS ECHO LAST STRESS DIASTOLIC BP: 90
STRESS ECHO LAST STRESS SYSTOLIC BP: 135
STRESS ECHO TARGET HR: 155
SYSTOLIC BLOOD PRESSURE - MUSE: NORMAL MMHG
T AXIS - MUSE: 90 DEGREES
UFH PPP CHRO-ACNC: 0.31 IU/ML
VENTRICULAR RATE- MUSE: 86 BPM

## 2024-10-22 PROCEDURE — 250N000013 HC RX MED GY IP 250 OP 250 PS 637: Performed by: STUDENT IN AN ORGANIZED HEALTH CARE EDUCATION/TRAINING PROGRAM

## 2024-10-22 PROCEDURE — 343N000001 HC RX 343 MED OP 636: Performed by: PHYSICIAN ASSISTANT

## 2024-10-22 PROCEDURE — 250N000013 HC RX MED GY IP 250 OP 250 PS 637: Performed by: SPECIALIST

## 2024-10-22 PROCEDURE — 250N000013 HC RX MED GY IP 250 OP 250 PS 637: Performed by: INTERNAL MEDICINE

## 2024-10-22 PROCEDURE — G1010 CDSM STANSON: HCPCS

## 2024-10-22 PROCEDURE — 90945 DIALYSIS ONE EVALUATION: CPT

## 2024-10-22 PROCEDURE — 80069 RENAL FUNCTION PANEL: CPT | Performed by: STUDENT IN AN ORGANIZED HEALTH CARE EDUCATION/TRAINING PROGRAM

## 2024-10-22 PROCEDURE — 99232 SBSQ HOSP IP/OBS MODERATE 35: CPT | Performed by: INTERNAL MEDICINE

## 2024-10-22 PROCEDURE — G1010 CDSM STANSON: HCPCS | Performed by: INTERNAL MEDICINE

## 2024-10-22 PROCEDURE — 85520 HEPARIN ASSAY: CPT | Performed by: INTERNAL MEDICINE

## 2024-10-22 PROCEDURE — 99233 SBSQ HOSP IP/OBS HIGH 50: CPT | Performed by: INTERNAL MEDICINE

## 2024-10-22 PROCEDURE — 93018 CV STRESS TEST I&R ONLY: CPT | Performed by: INTERNAL MEDICINE

## 2024-10-22 PROCEDURE — 999N000049 HC STATISTIC ECHO STRESS OR NM NPI

## 2024-10-22 PROCEDURE — 93005 ELECTROCARDIOGRAM TRACING: CPT

## 2024-10-22 PROCEDURE — 250N000011 HC RX IP 250 OP 636: Performed by: INTERNAL MEDICINE

## 2024-10-22 PROCEDURE — 99222 1ST HOSP IP/OBS MODERATE 55: CPT | Performed by: SPECIALIST

## 2024-10-22 PROCEDURE — 99222 1ST HOSP IP/OBS MODERATE 55: CPT | Mod: 25 | Performed by: PHYSICIAN ASSISTANT

## 2024-10-22 PROCEDURE — 120N000001 HC R&B MED SURG/OB

## 2024-10-22 PROCEDURE — A9500 TC99M SESTAMIBI: HCPCS | Performed by: PHYSICIAN ASSISTANT

## 2024-10-22 PROCEDURE — 36415 COLL VENOUS BLD VENIPUNCTURE: CPT | Performed by: INTERNAL MEDICINE

## 2024-10-22 PROCEDURE — 85610 PROTHROMBIN TIME: CPT | Performed by: INTERNAL MEDICINE

## 2024-10-22 PROCEDURE — 93016 CV STRESS TEST SUPVJ ONLY: CPT | Performed by: INTERNAL MEDICINE

## 2024-10-22 PROCEDURE — 78452 HT MUSCLE IMAGE SPECT MULT: CPT | Mod: 26 | Performed by: INTERNAL MEDICINE

## 2024-10-22 PROCEDURE — 250N000011 HC RX IP 250 OP 636: Performed by: STUDENT IN AN ORGANIZED HEALTH CARE EDUCATION/TRAINING PROGRAM

## 2024-10-22 PROCEDURE — 93017 CV STRESS TEST TRACING ONLY: CPT

## 2024-10-22 RX ORDER — DIAZEPAM 5 MG/1
5 TABLET ORAL EVERY 30 MIN PRN
Status: DISCONTINUED | OUTPATIENT
Start: 2024-10-22 | End: 2024-10-25 | Stop reason: HOSPADM

## 2024-10-22 RX ORDER — LIDOCAINE 40 MG/G
CREAM TOPICAL
Status: DISCONTINUED | OUTPATIENT
Start: 2024-10-22 | End: 2024-10-25 | Stop reason: HOSPADM

## 2024-10-22 RX ORDER — CAFFEINE CITRATE 20 MG/ML
60 SOLUTION INTRAVENOUS
Status: ACTIVE | OUTPATIENT
Start: 2024-10-22 | End: 2024-10-22

## 2024-10-22 RX ORDER — DIPHENHYDRAMINE HYDROCHLORIDE 50 MG/ML
25-50 INJECTION INTRAMUSCULAR; INTRAVENOUS
Status: DISCONTINUED | OUTPATIENT
Start: 2024-10-22 | End: 2024-10-25 | Stop reason: HOSPADM

## 2024-10-22 RX ORDER — AMINOPHYLLINE 25 MG/ML
50-100 INJECTION, SOLUTION INTRAVENOUS
Status: DISCONTINUED | OUTPATIENT
Start: 2024-10-22 | End: 2024-10-25 | Stop reason: HOSPADM

## 2024-10-22 RX ORDER — CAFFEINE 200 MG
200 TABLET ORAL
Status: ACTIVE | OUTPATIENT
Start: 2024-10-22 | End: 2024-10-22

## 2024-10-22 RX ORDER — ALBUTEROL SULFATE 90 UG/1
2 INHALANT RESPIRATORY (INHALATION) EVERY 5 MIN PRN
Status: DISCONTINUED | OUTPATIENT
Start: 2024-10-22 | End: 2024-10-25 | Stop reason: HOSPADM

## 2024-10-22 RX ORDER — DIPHENHYDRAMINE HCL 25 MG
25 CAPSULE ORAL
Status: DISCONTINUED | OUTPATIENT
Start: 2024-10-22 | End: 2024-10-25 | Stop reason: HOSPADM

## 2024-10-22 RX ORDER — NITROGLYCERIN 0.4 MG/1
0.4 TABLET SUBLINGUAL EVERY 5 MIN PRN
Status: ACTIVE | OUTPATIENT
Start: 2024-10-22 | End: 2024-10-22

## 2024-10-22 RX ORDER — REGADENOSON 0.08 MG/ML
0.4 INJECTION, SOLUTION INTRAVENOUS ONCE
Status: COMPLETED | OUTPATIENT
Start: 2024-10-22 | End: 2024-10-22

## 2024-10-22 RX ORDER — SODIUM CHLORIDE 9 MG/ML
INJECTION, SOLUTION INTRAVENOUS CONTINUOUS
Status: ACTIVE | OUTPATIENT
Start: 2024-10-22 | End: 2024-10-22

## 2024-10-22 RX ORDER — LORAZEPAM 0.5 MG/1
0.5 TABLET ORAL EVERY 30 MIN PRN
Status: DISCONTINUED | OUTPATIENT
Start: 2024-10-22 | End: 2024-10-25 | Stop reason: HOSPADM

## 2024-10-22 RX ORDER — GENTAMICIN SULFATE 1 MG/G
CREAM TOPICAL DAILY PRN
Status: DISCONTINUED | OUTPATIENT
Start: 2024-10-22 | End: 2024-10-23

## 2024-10-22 RX ORDER — METHYLPREDNISOLONE SODIUM SUCCINATE 125 MG/2ML
125 INJECTION INTRAMUSCULAR; INTRAVENOUS
Status: DISCONTINUED | OUTPATIENT
Start: 2024-10-22 | End: 2024-10-25 | Stop reason: HOSPADM

## 2024-10-22 RX ORDER — AMOXICILLIN AND CLAVULANATE POTASSIUM 500; 125 MG/1; MG/1
1 TABLET, FILM COATED ORAL
Status: DISCONTINUED | OUTPATIENT
Start: 2024-10-22 | End: 2024-10-29

## 2024-10-22 RX ADMIN — ATORVASTATIN CALCIUM 40 MG: 40 TABLET, FILM COATED ORAL at 21:09

## 2024-10-22 RX ADMIN — POLYETHYLENE GLYCOL 3350 17 G: 17 POWDER, FOR SOLUTION ORAL at 08:31

## 2024-10-22 RX ADMIN — ACETAMINOPHEN 1000 MG: 500 TABLET, FILM COATED ORAL at 21:09

## 2024-10-22 RX ADMIN — GENTAMICIN SULFATE: 1 CREAM TOPICAL at 10:00

## 2024-10-22 RX ADMIN — SACUBITRIL AND VALSARTAN 1 TABLET: 49; 51 TABLET, FILM COATED ORAL at 08:32

## 2024-10-22 RX ADMIN — ALLOPURINOL 200 MG: 100 TABLET ORAL at 21:09

## 2024-10-22 RX ADMIN — CALCITRIOL CAPSULES 0.25 MCG 0.25 MCG: 0.25 CAPSULE ORAL at 21:09

## 2024-10-22 RX ADMIN — OXYCODONE HYDROCHLORIDE 2.5 MG: 5 TABLET ORAL at 21:10

## 2024-10-22 RX ADMIN — ASPIRIN 81 MG: 81 TABLET, COATED ORAL at 08:32

## 2024-10-22 RX ADMIN — SEVELAMER CARBONATE 800 MG: 800 TABLET, FILM COATED ORAL at 18:18

## 2024-10-22 RX ADMIN — PIPERACILLIN AND TAZOBACTAM 2.25 G: 2; .25 INJECTION, POWDER, FOR SOLUTION INTRAVENOUS at 05:14

## 2024-10-22 RX ADMIN — Medication 26.8 MILLICURIE: at 13:25

## 2024-10-22 RX ADMIN — AMOXICILLIN AND CLAVULANATE POTASSIUM 1 TABLET: 500; 125 TABLET, FILM COATED ORAL at 14:50

## 2024-10-22 RX ADMIN — PANTOPRAZOLE SODIUM 40 MG: 40 TABLET, DELAYED RELEASE ORAL at 21:09

## 2024-10-22 RX ADMIN — SEVELAMER CARBONATE 800 MG: 800 TABLET, FILM COATED ORAL at 08:32

## 2024-10-22 RX ADMIN — PANTOPRAZOLE SODIUM 40 MG: 40 TABLET, DELAYED RELEASE ORAL at 08:33

## 2024-10-22 RX ADMIN — Medication 8.8 MILLICURIE: at 11:10

## 2024-10-22 RX ADMIN — REGADENOSON 0.4 MG: 0.08 INJECTION, SOLUTION INTRAVENOUS at 13:24

## 2024-10-22 RX ADMIN — CARVEDILOL 6.25 MG: 6.25 TABLET, FILM COATED ORAL at 21:09

## 2024-10-22 RX ADMIN — SACUBITRIL AND VALSARTAN 1 TABLET: 49; 51 TABLET, FILM COATED ORAL at 21:09

## 2024-10-22 RX ADMIN — HEPARIN SODIUM 1250 UNITS/HR: 10000 INJECTION, SOLUTION INTRAVENOUS at 18:39

## 2024-10-22 RX ADMIN — INSULIN ASPART 1 UNITS: 100 INJECTION, SOLUTION INTRAVENOUS; SUBCUTANEOUS at 18:18

## 2024-10-22 RX ADMIN — SEVELAMER CARBONATE 800 MG: 800 TABLET, FILM COATED ORAL at 14:50

## 2024-10-22 RX ADMIN — ACETAMINOPHEN 1000 MG: 500 TABLET, FILM COATED ORAL at 08:33

## 2024-10-22 ASSESSMENT — ACTIVITIES OF DAILY LIVING (ADL)
ADLS_ACUITY_SCORE: 31

## 2024-10-22 NOTE — PROGRESS NOTES
[facility]    Medicine Progress Note - Hospitalist Service    Date of Admission:  10/15/2024  Interval history:   Patient is comfortable today. He was seen by cardiology and had stress test in preparation for bypass surgery.   Discussion regarding imaging and area on his kidney.  Will require follow-up to imaging of his left kidney.  Reportedly has a urologist as an outpatient.  Initially this morning plans were to have surgery potentially on 10/23.  He then required a stress test.     Assessment & Plan   Mr. Arnulfo Pritchett is a 64 yo male with Afib on warfarin, GERD, gout, insomnia, ESRD on PD, HFrEF, HTN, IDDM, diabetic peripheral neuropathy, CAD s/p stent placement x 3 (last 2021), TALI, HLD, and RLE PAD s/p balloon angio and stenting of SFA/popliteal arteries presented to [facility] ED with worsening pain and redness in left toe with streaking up the leg  for the last 2 days with throbbing pain.      Known history of peripheral vascular disease.  1) Admitted through [facility] 5/6- 5/11/2024 with right lower extremity arterial occlusion.  Prescribed Eliquis but having difficulties affording it over the 6 months prior to arrival.  Underwent right leg angiogram and recannulization of the SFA popliteal artery with balloon angioplasty and stenting (warfarin with heparin bridge and continued on Plavix with stop of aspirin)   2) Admit admit 7/9 to 7/11/2024 [facility] for left great toe blackening 7/9/2024 (had been treated with Augmentin.  X-ray showing no osteomyelitis or fracture): Arterial ultrasound with patent flow but x-ray 7/9 showing first phalangeal irregularity consistent with osteomyelitis.  Initially on IV Vanco and Zosyn.  Seen by podiatry debrided left great toe with no overt evidence of left foot osteomyelitis.  Thought to be a left great toe infection.  Received IV Vanco and Zosyn for 3 days and discharged on Augmentin for 2 weeks    Seen by podiatry 10/7  with left toe ulcer: Entire left hallux nail plate poorly attached with a subungual hematoma:   Current admission on 10/15/2024 >> increasing pain throbbing redness red streaking on his left foot extending from the left toe.     ## Worsening L great toe wound    ## Hx of acute RLE arterial occlusion s/p SFA popliteal balloon angioplasty and stenting, 5/6/2024 (on warfarin and plavix)   Presented to OSH with 2 day history of erythema and streaking redness going up his leg with associated increase pain and throbbing noted. No fever but both CRP 55 and ESR 86 are elevated.   - XR of left foot show:Soft tissue swelling great toe. There is subtle cortical  irregularity and probable erosive change along the distal phalanx/tuft  of the great toe which does raise concern for osteomyelitis.     Obtained MRI foot ->  Distal phalanx of the first digit appears pointed distally with replacement of normal fatty marrow and a soft tissue defect at the distal tuft. About 14 mm of the distal phalanx appear normal with normal fat signal and without significant edema. Constellation of findings favors gangrene.  Podiatry consultation -> will need at least a partial left hallux amputation, once/if vascular status is optimized.   Vascular surgery consultation  IR consulted -> 10/17 CTA abdomen/pelvis runoff for further evaluation of disease and to guide procedure planning.    - Right leg with no inflow or femoral-popliteal segment obstructions.  Occluded anterior tibial artery origin with proximal reconstruction without distal stenosis.Normal caliber peroneal artery. Tandem severe stenosis of the distal posterior tibial artery.   - Left leg: No inflow obstruction. Focal calcified severe stenosis of the proximal superficial femoral artery. Long segment occlusion of the mid to distal popliteal artery with low attenuation changes which may represent thrombus or soft  atherosclerotic plaque. Reconstitution of the origins of the posterior  tibial arteries. Severe tandem stenoses of the distal posterior tibial artery.  Continue Zosyn  Holding warfarin and plavix in anticipation for possible surgical intervention and allowed INR to drift down.   10/20 INR to 1.7 (started on heparin drip)   10/20 as per vascular note planning for left femoral to below the knee popliteal in situ bypass graft with embolectomy.   Patient is not a candidate for attempted endovascular thrombectomy since high likelihood of embolization per prior discussions   10/22 ID consult: Discontinue Zosyn. (Suspected discoloration from ischemia and not cellulitis) changed to oral Augmentin until bypass surgery.  10/22 discussion regarding pain control.  Okay for patient to have opioids in the setting of his peripheral artery disease and upcoming surgery  Trial oxycodone 2.5 mg every 6 prn      ## Chronic paroxysmal afib s/p atrial ablation, 6/2023  ## HFrEF 2/2 ICM  ## HLD  ## HTN  ## Hx of CAD s/p ALESIA placement (last 2021)  PTA now warfarin and Plavix instead of apixaban as above, as well as Entresto, Coreg, torsemide and statin.  Most recent EF last month on 6/4 with severely decrease LV function with est EF 20-25%.    Noted blood pressure soft 90/70s -> BPs now normalized  Decrease Coreg from 12.5 mg BID to 6.25 mg BID  Holding Torsemide  resume Entresto  Resume statin  Cardiology consult for presurgical risk assessment with bypass planned   10/22 Isabell scan abnormal.  No evidence of ischemia.  Medium sized area of infarction in the entire inferolateral left ventricle extending into the inferolateral segment of left ventricle and basal segment.  Small area of nontransmural infarction apex.  EF 26  10/22 further input from cardiology regarding Isabell scan and preoperative clearance        ## ESRD on PD 2/2 DM nephropathy  ## Secondary hyperparathyroidism  ## Hyperkalemia  PTA on nightly PD of which he has been tolerating. Access RLQ double cuffed coiled PD catheter placed 4/16/2021.   -  "Nephrology consulted for PD planning  - Continue RRT medications below  - Continue calcitriol, cinacalcet and sevelamer carbonate  - Continue every other day gentamicin cream to PD cath side  - Telemetry for Hyperkalemia 5.7 -> now wnl -> tele stopped  - [facility] for hyperkalemia   - management per nephrology     ## Hx of RCC s/p R nephrectomy, 5/2022  -  CTA with 4 mm hyperenhancing nodule in lower pole of the left kidney   - follow up evaluation   -10/22 discussion with patient today who is aware of this finding and will follow-up with his urologist after discharge     ## DM type II  ## Diabetic neuropathy  Mod cho diet  Medium intensity sliding scale insulin  Last A1c 5.9 7/9/24  -10/22 reviewed blood sugars which are currently     # Other chronic, stable medical conditions:  # Hx of gout: Continue PTA allopurinol  # TALI: Intolerant to nasal CPAP   # GERD: Continue daily PPI  # Insomnia: Continue PTA melatonin.         Diet: 2 Gram Sodium Diet    DVT Prophylaxis: heparin drip   Rosario Catheter: Not present  Lines: None     Cardiac Monitoring: None  Code Status: Full Code      Clinically Significant Risk Factors         # Hyponatremia: Lowest Na = 134 mmol/L in last 2 days, will monitor as appropriate  # Hypochloremia: Lowest Cl = 97 mmol/L in last 2 days, will monitor as appropriate      # Hypoalbuminemia: Lowest albumin = 2 g/dL at 10/22/2024  6:48 AM, will monitor as appropriate    # Coagulation Defect: INR = 1.32 (Ref range: 0.85 - 1.15) and/or PTT = N/A, will monitor for bleeding    # Hypertension: Noted on problem list           # Overweight: Estimated body mass index is 25.51 kg/m  as calculated from the following:    Height as of 10/8/24: 1.82 m (5' 11.65\").    Weight as of this encounter: 84.5 kg (186 lb 4.6 oz).      # Financial/Environmental Concerns: none         Disposition Plan     Medically Ready for Discharge: Anticipated in 2-4 Days       PAMELLA HIGGINBOTHAM MD  Hospitalist Service  [facility] " [facility]  Securely message with [employer] (more info)  Text page via [employer] Paging/Directory   ______________________________________________________________________      Physical Exam   Vital Signs: Temp: 98.1  F (36.7  C) Temp src: Oral BP: 132/88 Pulse: 87   Resp: 16 SpO2: 96 % O2 Device: None (Room air)    Weight: 186 lbs 4.62 oz    General Appearance: Patient is awake and alert.  Respiratory: Clear to auscultation bilaterally without wheezes or rales  Cardiovascular: Regular rate and rhythm without gallop rub normal S1  and S2   GI: Positive bowel sounds soft no rebound guarding  Skin: Left toe is black in appearance on the tip.  There is erythematous purpleish change on the more proximal part of his foot      Medical Decision Making       50 MINUTES SPENT BY ME on the date of service doing chart review, history, exam, documentation & further activities per the note.    Coordination of care with preoperative clearance.  ID consult.  Discussion of imaging including findings on the kidney.    Data     I have personally reviewed the following data over the past 24 hrs:    N/A  \   N/A   / N/A     136 97 (L) 44.9 (H) /  149 (H)   4.0 27 8.16 (H) \     ALT: N/A AST: N/A AP: N/A TBILI: N/A   ALB: 2.0 (L) TOT PROTEIN: N/A LIPASE: N/A     INR:  1.32 (H) PTT:  N/A   D-dimer:  N/A Fibrinogen:  N/A       Imaging results reviewed over the past 24 hrs:   Recent Results (from the past 24 hour(s))   NM Lexiscan stress test (nuc card)   Result Value    Target     Baseline Systolic     Baseline Diastolic     Last Stress Systolic     Last Stress Diastolic BP 90    Baseline HR 79    Max HR  88    Max Predicted HR  57    Rate Pressure Product 11,880.0    Left Ventricular EF 26    Narrative      The nuclear stress test is abnormal. No evidence of ischemia. presence   of visecral tracer artifact decreases specificity.    There is a medium sized area of infarction in the entire inferolateral   segment(s)  of the left ventricle extending into basal inferior segment.    There is a small area of nontransmural infarction in the apical   segment(s) of the left ventricle.    TID is absent.    Left ventricular function is severely reduced.    The left ventricular ejection fraction at stress is 26%.    There is no prior study for comparison.

## 2024-10-22 NOTE — PLAN OF CARE
Date/Time: 10/21/24 2921-6242    Summary: 64 y/o M/PMH of ESRD on peritoneal dialysis, CAD, A fib, PAD who   Mr. Arnulfo Pritchett is a 62 yo male with Afib on warfarin, GERD, gout, insomnia, ESRD on PD, HFrEF, HTN, IDDM, diabetic peripheral neuropathy, CAD s/p stent placement x 3 (last 2021), TALI, HLD, and RLE PAD s/p balloon angio and stenting of SFA/popliteal arteries. Presented to [facility]'s ED with worsening pain and redness to L great toe and streaking up leg ongoing for 2 days.    Diagnosis: gangrene of L great toe.  Orientation: A&Ox4  Behavior & Aggression: green  Activity: SBA, quad cane  Diet: 2 g Na  Fall risk: yes  Isolation: N/A  Pain: Left lower great toe wound pain- acetaminophen-codeine given, refused PRN tylenol  B&B: continent  VS/O2: VSS, RA.  IV: R PIV x2; one infusing heparin @ 1250 units/hr(at goal rate, hep 10a check tomorrow); other SL.  Drains/Devices: LLQ PD site.  Tele: N/A  Abnormal Labs: hep Xa draw axel AM  Skin: Dusky in color. LLE great toe black w/ dry scab gangrenous, red/luis streaking extending up foot and into shin. Dry/flaky heels/feet, scattered bruising.  Consults/Procedures: vascular surgery procedure scheduled for 10/25  D/C Date: TBD  Other: LLE pedal pulse with doppler, R pedal pulse +1- history of numbness neuropathy has nightly peritoneal dialysis

## 2024-10-22 NOTE — CONSULTS
[facility]    Infectious Disease Consultation     Date of Admission:  10/15/2024  Date of Consult : 10/22/24      Assessment:  63YM with diabetes complicated by nephropathy- ESRD on peritoneal dialysis, polyneuropathy and PAD among other conditions, s/p RLE balloon angio and stenting of SFA/popliteal arteries, who has been hospitalized due to left great toe discoloration and pain, and has been found to have gangrene related to ischemia. He is planned for a bypass. No apparent cellulitis at this time    -LLE ischemia with dry gangrene of the great toe. Planned for  left femoral to below the knee popliteal in situ bypass graft with embolectomy.   -Diabetes with good control HbA1c of 5.6%  -PAD, s/p RLE balloon angio and stenting of SFA/popliteal arteries, now with LLE ischemic changes  -ESRd on peritoneal dialysis  -Chronic medical conditions - Atrial fibrillation s/p ablation 06/2023, Htn, CHF, hyperlipidemia, CAD . Secondary hyperparathyroidism, hx of RCCa s/p r nephrectomy, diabetes with polyneuropathy    Recommendations:  Discontinue zosyn  Feel discoloration is related to ischemia, no apparent cellulitis. Reasonable to maintain on oral Augmentin at renal adjusted dose due to streaking until bypass surgery after which antibiotics can be discontinued.  Cardiac catheterization is planned prior to bypass    ID will sign  off, please call us back as needed  Laura Cuellar MD    Reason for Consult   Reason for consult: I was asked to evaluate this patient for diabetic foot infection with gangrene.    Primary Care Physician   Chai Griffin    Chief Complaint   L great toe discoloration and pain    History is obtained from the patient and medical records    History of Present Illness   Arnulfo Pritchett is a 64 year old male with diabetes complicated by nephropathy- ESRD on peritoneal dialysis, polyneuropathy and PAD among other conditions, s/p RLE balloon angio and stenting of SFA/popliteal arteries,  who has been hospitalized due to left great toe discoloration and pain, and has been found to have gangrene related to ischemia. He is planned for a bypass.    Patient has had ongoing and worsening discoloration since July. Was admitted 7/9 - 7/11/2024 [facility] for left great toe blackening  (had been treated with Augmentin): Arterial ultrasound with patent flow but x-ray 7/9 showing first phalangeal irregularity consistent with osteomyelitis.  Initially on IV Vanco and Zosyn.  Seen by podiatry debrided left great toe with no overt evidence of left foot osteomyelitis.   Now has had increasing pain, was evaluated by podiatry and the entire left hallux nail plate was poorly attached with a subungual hematoma: MRI is suggestive of gangrene.    Vascular studies show focal calcified severe stenosis of the proximal superficial femoral artery. Long segment occlusion of the mid to distal popliteal artery with low attenuation changes which may represent thrombus or soft atherosclerotic plaque    Patient is planned for left femoral to below the knee popliteal in situ bypass graft with embolectomy in coming days.   Patient has been maintained on zosyn and ID has been asked to assist with further antibiotic management.    Antimicrobial therapy  10/15-present   zosyn  Past Medical History   I have reviewed this patient's medical history and updated it with pertinent information if needed.   Past Medical History:   Diagnosis Date    CAD (coronary artery disease)     Chronic systolic heart failure (H)     ESRD (end stage renal disease) on dialysis (H)     Gastroesophageal reflux disease without esophagitis     Gout     HLD (hyperlipidemia)     TALI (obstructive sleep apnea)     PAD (peripheral artery disease) (H)     S/p RLE stenting of SFA/popliteal arteries    Type 2 diabetes mellitus with diabetic neuropathy (H)        Past Surgical History   I have reviewed this patient's surgical history and updated it with  pertinent information if needed.  Past Surgical History:   Procedure Laterality Date    IR LOWER EXTREMITY ANGIOGRAM LEFT  10/17/2024       Prior to Admission Medications   Prior to Admission Medications   Prescriptions Last Dose Informant Patient Reported? Taking?   B Complex-C-Folic Acid (DIALYVITE) TABS Past Week Self Yes Yes   Sig: Take 1 tablet by mouth daily.   SEVELAMER CARBONATE PO 10/14/2024 Self Yes Yes   Sig: Take 800 mg by mouth 3 times daily (with meals)   acetaminophen (TYLENOL) 500 MG tablet 10/15/2024 at am Self Yes Yes   Sig: Take 1,000 mg by mouth 2 times daily.   acetaminophen-codeine (TYLENOL #3) 300-30 MG per tablet 10/14/2024 at PRN  Yes Yes   Sig: Take 1 tablet by mouth every 6 hours as needed for severe pain.   allopurinol (ZYLOPRIM) 100 MG tablet 10/14/2024 at pm Self Yes Yes   Sig: Take 200 mg by mouth every evening   atorvastatin (LIPITOR) 40 MG tablet 10/14/2024 at pm Self Yes Yes   Sig: Take 40 mg by mouth at bedtime.   calcitRIOL (ROCALTROL) 0.25 MCG capsule 10/14/2024 at pm Self Yes Yes   Sig: Take 0.25 mcg by mouth at bedtime.   carvedilol (COREG) 25 MG tablet 10/14/2024 at pm Self Yes Yes   Sig: Take 12.5 mg by mouth at bedtime.   cephALEXin (KEFLEX) 250 MG capsule 10/15/2024 at am Self No Yes   Sig: Take 1 capsule (250 mg) by mouth 2 times daily for 10 days.   cinacalcet (SENSIPAR) 60 MG tablet 10/14/2024 Self Yes Yes   Sig: Take 60 mg by mouth. Take 1 tablet (60 mg) three times a week: Monday, Wednesday, and Friday nights   clopidogrel (PLAVIX) 75 MG tablet 10/14/2024 at pm  Yes Yes   Sig: Take 75 mg by mouth every evening.   gentamicin (GARAMYCIN) 0.1 % external cream  Self Yes No   Sig: Apply topically daily. Apply topically on peritoneal access site to prevent infections   insulin glargine (LANTUS PEN) 100 UNIT/ML pen 10/14/2024 at pm Self Yes Yes   Sig: Inject 39 Units subcutaneously at bedtime.   melatonin 5 MG tablet 10/14/2024 at pm Self Yes Yes   Sig: Take 5 mg by mouth at  bedtime   omeprazole (PRILOSEC) 40 MG DR capsule 10/14/2024 Self Yes Yes   Sig: Take 40 mg by mouth daily.   sacubitril-valsartan (ENTRESTO) 49-51 MG per tablet 10/15/2024 at am Self Yes Yes   Sig: Take 1 tablet by mouth 2 times daily   torsemide (DEMADEX) 100 MG tablet 10/15/2024 at am Self Yes Yes   Sig: Take 100 mg by mouth every morning   warfarin ANTICOAGULANT (COUMADIN) 5 MG tablet 10/14/2024 at pm Self Yes Yes   Sig: Take by mouth daily. Take 5 mg MWF nights & 1.5 tablet ROW      Facility-Administered Medications: None     Allergies   No Known Allergies    Immunization History   Immunization History   Administered Date(s) Administered    COVID-19 Monovalent 18+ (Moderna) 02/08/2021, 03/04/2021, 08/16/2021    Hepatitis B, Adult 08/03/2021, 10/05/2021, 11/02/2021, 04/05/2022    INFLUENZA,TRIVALENT (FLUCELVAX) 10/03/2018    Influenza Vaccine (Flucelvax Quadrivalent) 10/03/2017, 10/03/2018    Influenza Vaccine 18-64 (Flublok) 10/31/2023    Influenza Vaccine 65+ (Fluzone HD) 10/27/2022    Influenza Vaccine >6 months,quad, PF 09/11/2020, 09/21/2021    Influenza Vaccine, 6+MO IM (QUADRIVALENT W/PRESERVATIVES) 10/03/2017    Influenza, Split Virus, Trivalent, Pf (Fluzone\Fluarix) 11/23/2015    Influenza,INJ,MDCK,PF,Quad >6mo(Flucelvax) 10/03/2018    Pneumo Conj 13-V (2010&after) 06/03/2020    Pneumococcal 23 valent 05/04/2021    TDAP (Adacel,Boostrix) 06/03/2020    Td (Adult), Adsorbed 03/29/2004    Zoster recombinant adjuvanted (SHINGRIX) 11/30/2018, 02/05/2019       Social History   I have reviewed this patient's social history and updated it with pertinent information if needed. Arnulfo Pritchett  reports that he has quit smoking. His smoking use included cigarettes. He has never used smokeless tobacco. He reports that he does not currently use alcohol.    Family History   I have reviewed this patient's family history and updated it with pertinent information if needed.   No family history on file.    Review of Systems    The 10 point Review of Systems is as per HPI    Physical Exam   Temp: 98.1  F (36.7  C) Temp src: Oral BP: 93/66 Pulse: 88   Resp: 16 SpO2: 98 % O2 Device: None (Room air)    Vital Signs with Ranges  Temp:  [98.1  F (36.7  C)-98.7  F (37.1  C)] 98.1  F (36.7  C)  Pulse:  [88-92] 88  Resp:  [14-16] 16  BP: ()/(66-92) 93/66  SpO2:  [95 %-98 %] 98 %  186 lbs 4.62 oz  Body mass index is 25.51 kg/m .    GENERAL APPEARANCE:  awake  EYES: Eyes grossly normal to inspection  NECK: no adenopathy  RESP: lungs clear   CV: S1S2  ABDOMEN: soft, nontender  MS: L great toe dry gangrene, and ischemic changes of th left forefoot.      Data   All laboratory data reviewed    Component      Latest Ref Rng 10/22/2024  6:48 AM   Potassium      3.4 - 5.3 mmol/L 4.0    Chloride      98 - 107 mmol/L 97 (L)    Carbon Dioxide (CO2)      22 - 29 mmol/L 27    Anion Gap      7 - 15 mmol/L 12    Glucose      70 - 99 mg/dL 190 (H)    Urea Nitrogen      8.0 - 23.0 mg/dL 44.9 (H)    Creatinine      0.67 - 1.17 mg/dL 8.16 (H)    GFR Estimate      >60 mL/min/1.73m2 7 (L)    Calcium      8.8 - 10.4 mg/dL 7.9 (L)    Albumin      3.5 - 5.2 g/dL 2.0 (L)    Phosphorus      2.5 - 4.5 mg/dL 5.3 (H)       Component      Latest Ref Rng 10/20/2024  8:39 AM   WBC      4.0 - 11.0 10e3/uL 6.3    RBC Count      4.40 - 5.90 10e6/uL 3.07 (L)    Hemoglobin      13.3 - 17.7 g/dL 10.4 (L)    Hematocrit      40.0 - 53.0 % 31.2 (L)    MCV      78 - 100 fL 102 (H)    MCH      26.5 - 33.0 pg 33.9 (H)    MCHC      31.5 - 36.5 g/dL 33.3    RDW      10.0 - 15.0 % 14.2    Platelet Count      150 - 450 10e3/uL 274       Imaging  EXAM: CTA ABDOMEN PELVIS RUNOFF W CONTRAST  LOCATION: [facility]  DATE: 10/17/2024     INDICATION: Severe PAD with LLE CLTI with non healing wound, procedure planning for possible LLE intervention  COMPARISON: Left lower extremity arterial duplex ultrasound 10/16/2024.  TECHNIQUE: Helical acquisition through the abdomen,  pelvis, and bilateral lower extremities was performed during the arterial phase of contrast enhancement using IV Contrast. 2D and 3D reconstructions were performed by the CT technologist. Dose reduction   techniques were used.   CONTRAST: 100ml isovue 370     FINDINGS:  AORTA: Moderate, calcified atherosclerotic changes throughout the abdominal aorta which is moderately tortuous at the infrarenal portion. No significant aortic stenosis or aneurysmal dilatation. Normal caliber celiac and superior mesenteric arteries.   Moderate calcified changes within the left renal artery with mild tandem stenoses. Opacification of a right renal artery stump. Patent inferior mesenteric artery.     RIGHT LEG: Moderate calcified changes of the common and internal iliac arteries without significant stenosis. Normal caliber external iliac artery. Near circumferential calcified atherosclerotic changes of the common femoral artery without stenosis.   Moderate calcified changes of the profunda femoris and superficial femoral arteries without stenosis. Bulky calcified changes of the popliteal artery with a patent, above-knee popliteal stent. No severe stenosis or occlusion of the below-knee popliteal   artery. Occluded anterior tibial artery origin with reconstitution at its proximal vertical segment. Normal caliber popliteal artery opacified down to the pedal artery. Normal caliber peroneal artery. Moderate calcified atherosclerotic changes of the   posterior tibial artery. There appear to be tandem, focal moderate to severe stenoses of the distal posterior tibial artery.     LEFT LEG: Moderate calcified atherosclerotic changes of the common and internal iliac arteries without stenosis. Normal caliber external iliac artery. Moderate calcified atherosclerotic changes of the common femoral and profunda femoris arteries without   significant stenosis. Moderate calcified atherosclerotic changes of the superficial femoral artery with what  appears to be a focal, severe calcified stenosis at its proximal aspect. Circumferential, bulky calcified atherosclerotic changes of the   popliteal artery. Irregular, long segment occlusion of the mid to distal popliteal artery for a length of approximately 9.3 cm, secondary to low attenuation material which may represent thrombus versus soft atherosclerotic plaque. Reconstitution of the   infrapopliteal arteries at the origins. Mild atherosclerotic irregularity of the anterior tibial artery, tibioperoneal trunk and peroneal arteries without significant stenosis. Irregular calcified changes of the posterior tibial artery with appear to be   severe stenoses at its distal aspect..     LUNG BASES: Small to moderate right pleural effusion. Small left pleural effusion.     HEPATOBILIARY: Normal.     PANCREAS: Normal.     SPLEEN: Normal.     ADRENAL GLANDS: Normal.     KIDNEYS: 4 mm hyperenhancing nodule within the anterior aspect of the lower pole the left kidney (image 77 series 4). Benign-appearing left renal cysts. No hydronephrosis. Absent right kidney. Mildly distended bladder.     BOWEL: No obstruction or inflammatory change. Moderate amount of free fluid, mainly within the perihepatic space and right paracolic gutter. Moderate pneumoperitoneum. Indwelling left abdominal peritoneal dialysis catheter with its loop formed within the   retrovesical region, in adequate position.     LYMPH NODES: No lymphadenopathy.     PELVIC ORGANS: Moderate prostatic enlargement.     MUSCULOSKELETAL: Multilevel degenerative disc disease, most marked at the L4 interspace. Chronic appearing ununited fractures of the visualized right ninth and 10th ribs. Moderate degenerative changes of the bilateral hips and knees. Small right knee   effusion.                                                                      CONCLUSION:  1.  Right leg: No inflow or femoropopliteal segment obstructions. Occluded anterior tibial artery origin with  proximal reconstitution without distal stenoses. Normal caliber peroneal artery. Tandem severe stenosis of the distal posterior tibial artery.  2.  Left leg: No inflow obstruction. Focal calcified severe stenosis of the proximal superficial femoral artery. Long segment occlusion of the mid to distal popliteal artery with low attenuation changes which may represent thrombus or soft   atherosclerotic plaque. Reconstitution of the origins of the posterior tibial arteries. Severe tandem stenoses of the distal posterior tibial artery.  3.  Moderate amount of free fluid. Pneumoperitoneum. This is likely secondary to the peritoneal dialysis catheter.  4.  Bilateral pleural effusions, greater on the right.  5.  4 mm hyperenhancing nodule within the lower pole the left kidney. This may represent a small primary renal cell carcinoma.      EXAM: US LOWER EXTREMITY VENOUS MAPPING LEFT  LOCATION: [facility]  DATE: 10/19/2024     INDICATION: PAD in need of femoral to below knee popliteal bypass  COMPARISON: None.  TECHNIQUE: Ultrasound examination of the left lower extremity veins was performed, including gray-scale and compression imaging.      LOWER  EXTREMITY FINDINGS:        VENOUS DIAMETERS  LEFT GREAT SAPHENOUS VEIN  Upper Thigh: 0.8-4.0 mm  Mid Thigh: 3.7-3.4 mm  Lower Thigh: 3.9-3.7 mm  Knee: 3.4 mm  Upper Calf: 3.9 mm  Mid Calf: 3.0 mm  Lower Calf: 2.8 mm  Ankle: 2.8 mm     LEFT SMALL SAPHENOUS VEIN  Not assessed                                                                       IMPRESSION:  1. Left greater saphenous vein mapping as described.      MR left foot without  contrast 10/16/2024 4:19 PM     History: left great toe tip gangrene with known PAD, eval for osteo     Techniques: Multiplanar multisequence imaging of the left foot was  obtained without  administration of intravenous contrast.     Comparison: Radiographs dated 10/7/2024     Findings:     Bones     The distal phalanx of the  first digit appears pointed distally with  replacement of normal fatty marrow and a soft tissue defect at the  distal tuft. About 14 mm of the distal phalanx appear normal with  normal fat signal and without significant edema. There appears to be a  clear line between the distal tuft abnormalities in the relatively  normal proximal two thirds of the first digit.      Large subcortical cyst at the distal and lateral aspect of the  proximal phalanx of the first digit, degenerative changes of the first  MTP joint with joint space narrowing, subcortical cystic changes and  cartilage loss     No fracture, marrow contusion or other marrow infiltrative changes.     Joints and periarticular soft tissue     Joint effusion: Physiologic amount of joint fluid are present.     Plantar plates: Intersesamoidal ligament and sesamoidal phalangeal  ligaments of the first metatarsophalangeal joints are intact. Plantar  plates of the second through fifth toe at metatarsophalangeal joints  are grossly intact.     Intermetatarsal spaces: No interdigital neuroma. No intermetatarsal  bursitis.     Ligaments and Tendons     Lisfranc interosseous ligament: Intact.     Tendons: The visualized courses of flexor and extensor tendons are  intact.      Muscles     Edema throughout the entire plantar musculature.                                                                      Impression:     1. Distal phalanx of the first digit appears pointed distally with  replacement of normal fatty marrow and a soft tissue defect at the  distal tuft. About 14 mm of the distal phalanx appear normal with  normal fat signal and without significant edema. Constellation of  findings favors gangrene.  2. No other osseous abnormalities to suggest osteomyelitis.  3. Degenerative changes of the interphalangeal joint and the  metatarsophalangeal joint of the first digit.  4. Widespread edema throughout the musculature as can be seen in  microvascular disease  associated with diabetes.

## 2024-10-22 NOTE — PROGRESS NOTES
Vascular Progress Note    OR time is now actually back to being 10/25 @ 840am. Discussed with patient who expressed understanding.     Note patient did get lexiscan stress today which did show EF 25% with global hypokinesis of left ventricle - this is noted on previous echo done in July as well. Does note several areas of infarctions. Will await further cardiology recommendations regarding if further imaging warranted.     Called and discussed plan for surgery with patient's son and daughter in law, answered all questions.     - Encourage up oob  - Protein supplements.     Kaiser Gamble MD  Vascular Surgery PGY4  To reach vascular surgery [place] team on call, please go to [facility] and from the drop down find the following:   VASCULAR SURGERY/[facility]  Then page the person listed to the right of day/night call. Can click the pager to page.

## 2024-10-22 NOTE — PROGRESS NOTES
Cycler set up per protocol and per treatment orders     Results from previous treatment:  Effluent color and clarity: Yellow and clear, no fibrin noted  Total UF: 457  Initial Drain: 128  Avg Dwell: 1:12  Lost Dwell: 0:52    Cycler Serial Number: [id number]  Total Treatment Volume: 57092mU  Total treatment time: 9 hrs  Fill Volume: 2000ml  Last Fill Volume:0ml  Heater ba.5% 6000mL;  Lot #: [id number];  Expiration Date:   Side Bag #1: 2.5% 6000mL;  Lot #: [id number];  Expiration Date:     Number of cycles including final fill: 5  Dwell Time: 1:22 minutes  Last Fill Volume: 0ml  Initial Drain Alarm: 0ml  PD orders reviewed with Patient procedure and ESRD teaching done and questions answered.  Cycler ready for hook-up.    Report given to: 6th floor charge nurse  Fredy consent form signed yes Cycler set up per protocol and per treatment orders     Treatment set up and orders reviewed by Dexter Haddad RN

## 2024-10-22 NOTE — PROGRESS NOTES
[facility]    Medicine Progress Note - Hospitalist Service    Date of Admission:  10/15/2024  Interval history:   Patient seen today for the first time.  He has had challenges with his left lower extremity. He is on peritoneal dialysis.   Seen by vascular, podiatry and nephrology   Currently on Zosyn  He is also on a heparin drip    Assessment & Plan   Mr. Arnulfo Pritchett is a 64 yo male with Afib on warfarin, GERD, gout, insomnia, ESRD on PD, HFrEF, HTN, IDDM, diabetic peripheral neuropathy, CAD s/p stent placement x 3 (last 2021), TALI, HLD, and RLE PAD s/p balloon angio and stenting of SFA/popliteal arteries presented to [facility] ED with worsening pain and redness in left toe with streaking up the leg  for the last 2 days with throbbing pain.      Known history of peripheral vascular disease.  1) Admitted through [facility] 5/6- 5/11/2024 with right lower extremity arterial occlusion.  Prescribed Eliquis but having difficulties affording it over the 6 months prior to arrival.  Underwent right leg angiogram and recannulization of the SFA popliteal artery with balloon angioplasty and stenting (warfarin with heparin bridge and continued on Plavix with stop of aspirin)   2) Admit admit 7/9 to 7/11/2024 [facility] for left great toe blackening 7/9/2024 (had been treated with Augmentin.  X-ray showing no osteomyelitis or fracture): Arterial ultrasound with patent flow but x-ray 7/9 showing first phalangeal irregularity consistent with osteomyelitis.  Initially on IV Vanco and Zosyn.  Seen by podiatry debrided left great toe with no overt evidence of left foot osteomyelitis.  Thought to be a left great toe infection.  Received IV Vanco and Zosyn for 3 days and discharged on Augmentin for 2 weeks    Seen by podiatry 10/7 with left toe ulcer: Entire left hallux nail plate poorly attached with a subungual hematoma:   Current admission on 10/15/2024 >> increasing pain throbbing  redness red streaking on his left foot extending from the left toe.     ## Worsening L great toe wound    ## Hx of acute RLE arterial occlusion s/p SFA popliteal balloon angioplasty and stenting, 5/6/2024 (on warfarin and plavix)   Presented to OSH with 2 day history of erythema and streaking redness going up his leg with associated increase pain and throbbing noted. No fever but both CRP 55 and ESR 86 are elevated.   - XR of left foot show:Soft tissue swelling great toe. There is subtle cortical  irregularity and probable erosive change along the distal phalanx/tuft  of the great toe which does raise concern for osteomyelitis.     Obtained MRI foot ->  Distal phalanx of the first digit appears pointed distally with replacement of normal fatty marrow and a soft tissue defect at the distal tuft. About 14 mm of the distal phalanx appear normal with normal fat signal and without significant edema. Constellation of findings favors gangrene.  Podiatry consultation -> will need at least a partial left hallux amputation, once/if vascular status is optimized.   Vascular surgery consultation  IR consulted -> 10/17 CTA abdomen/pelvis runoff for further evaluation of disease and to guide procedure planning.    - Right leg with no inflow or femoral-popliteal segment obstructions.  Occluded anterior tibial artery origin with proximal reconstruction without distal stenosis.Normal caliber peroneal artery. Tandem severe stenosis of the distal posterior tibial artery.   - Left leg: No inflow obstruction. Focal calcified severe stenosis of the proximal superficial femoral artery. Long segment occlusion of the mid to distal popliteal artery with low attenuation changes which may represent thrombus or soft  atherosclerotic plaque. Reconstitution of the origins of the posterior tibial arteries. Severe tandem stenoses of the distal posterior tibial artery.  Continue Zosyn  Holding warfarin and plavix in anticipation for possible surgical  intervention and allowed INR to drift down.   10/20 INR to 1.7 (started on heparin drip)   10/20 as per vascular note planning for left femoral to below the knee popliteal in situ bypass graft with embolectomy.   Patient is not a candidate for attempted endovascular thrombectomy since high likelihood of embolization per prior discussions   ID consult for input regarding antibiotic tapering, duration with graft.      ## Chronic paroxysmal afib s/p atrial ablation, 6/2023  ## HFrEF 2/2 ICM  ## HLD  ## HTN  ## Hx of CAD s/p ALESIA placement (last 2021)  PTA now warfarin and Plavix instead of apixaban as above, as well as Entresto, Coreg, torsemide and statin.  Most recent EF last month on 6/4 with severely decrease LV function with est EF 20-25%.    Noted blood pressure soft 90/70s -> BPs now normalized  Decrease Coreg from 12.5 mg BID to 6.25 mg BID  Holding Torsemide  resume Entresto  Resume statin  Cardiology consult for presurgical risk assessment with bypass planned         ## ESRD on PD 2/2 DM nephropathy  ## Secondary hyperparathyroidism  ## Hyperkalemia  PTA on nightly PD of which he has been tolerating. Access RLQ double cuffed coiled PD catheter placed 4/16/2021.   - Nephrology consulted for PD planning  - Continue RRT medications below  - Continue calcitriol, cinacalcet and sevelamer carbonate  - Continue every other day gentamicin cream to PD cath side  - Telemetry for Hyperkalemia 5.7 -> now wnl -> tele stopped  - [facility] for hyperkalemia   - management per nephrology     ## Hx of RCC s/p R nephrectomy, 5/2022  -  CTA with 4 mm hyperenhancing nodule in lower pole of the left kidney   - follow up evaluation      ## DM type II  ## Diabetic neuropathy  Mod cho diet  Medium intensity sliding scale insulin  Last A1c 5.9 7/9/24  - Blood sugars 167- 131     # Other chronic, stable medical conditions:  # Hx of gout: Continue PTA allopurinol  # TALI: Intolerant to nasal CPAP   # GERD: Continue daily PPI  # Insomnia:  Continue PTA melatonin.         Diet: 2 Gram Sodium Diet    DVT Prophylaxis: heparin drip   Rosario Catheter: Not present  Lines: None     Cardiac Monitoring: None  Code Status: Full Code      Clinically Significant Risk Factors         # Hyponatremia: Lowest Na = 134 mmol/L in last 2 days, will monitor as appropriate  # Hypochloremia: Lowest Cl = 97 mmol/L in last 2 days, will monitor as appropriate      # Hypoalbuminemia: Lowest albumin = 2 g/dL at 10/21/2024  6:49 AM, will monitor as appropriate  # Coagulation Defect: INR = 1.43 (Ref range: 0.85 - 1.15) and/or PTT = N/A, will monitor for bleeding    # Hypertension: Noted on problem list               # Financial/Environmental Concerns: none         Disposition Plan     Medically Ready for Discharge: Anticipated in 2-4 Days             PAMELLA HIGGINBOTHAM MD  Hospitalist Service  [facility]  Securely message with [employer] (more info)  Text page via [employer] Paging/Directory   ______________________________________________________________________      Physical Exam   Vital Signs: Temp: 98.6  F (37  C) Temp src: Oral BP: (!) 140/92 Pulse: 91   Resp: 14 SpO2: 95 % O2 Device: None (Room air)    Weight: 179 lbs 0 oz    General Appearance: Patient is awake and alert.  Respiratory: Clear to auscultation bilaterally without wheezes or rales  Cardiovascular: Regular rate and rhythm without gallop rub normal S1  and S2   GI: Positive bowel sounds soft no rebound guarding  Skin: Left toe is black in appearance on the tip.  There is erythematous purpleish change on the more proximal part of his foot      Medical Decision Making       45 MINUTES SPENT BY ME on the date of service doing chart review, history, exam, documentation & further activities per the note.      Data     I have personally reviewed the following data over the past 24 hrs:    N/A  \   N/A   / N/A     134 (L) 97 (L) 46.0 (H) /  131 (H)   4.2 25 8.43 (H) \     ALT: N/A AST: N/A AP: N/A TBILI:  N/A   ALB: 2.0 (L) TOT PROTEIN: N/A LIPASE: N/A     INR:  1.43 (H) PTT:  N/A   D-dimer:  N/A Fibrinogen:  N/A       Imaging results reviewed over the past 24 hrs:   No results found for this or any previous visit (from the past 24 hour(s)).

## 2024-10-22 NOTE — PLAN OF CARE
Date/Time: 10/21/24 [id number]     Summary: 64 y/o M/PMH of ESRD on peritoneal dialysis, CAD, A fib, PAD who   Mr. Arnulfo Pritchett is a 62 yo male with Afib on warfarin, GERD, gout, insomnia, ESRD on PD, HFrEF, HTN, IDDM, diabetic peripheral neuropathy, CAD s/p stent placement x 3 (last 2021), TALI, HLD, and RLE PAD s/p balloon angio and stenting of SFA/popliteal arteries. Presented to [facility] ED with worsening pain and redness to L great toe and streaking up leg ongoing for 2 days.    Diagnosis: gangrene of L great toe.  Orientation: A&Ox4  Behavior & Aggression: green  Activity: SBA, quad cane  Diet: 2 g Na  Fall risk: yes  Isolation: N/A  Pain: denies  B&B: continent, no BM this shift; endorses constipation, BS absent (does have fluid in abdomen).  VS/O2: VSS, RA.  IV: R PIV x2; one infusing heparin @ 1250 units/hr; other SL.  Drains/Devices: LLQ PD site.  Tele: N/A  Abnormal Labs:  @ 0200; hep Xa redraw in AM.  Skin: Dusky in color. LLE great toe black w/ dry scab gangrenous, red/luis streaking extending up foot and into shin. Dry/flaky heels/feet, scattered bruising.  Consults/Procedures: vascular surgery procedure on 10/25.  D/C Date: TBD  Other: has nightly peritoneal dialysis hooked up.

## 2024-10-22 NOTE — PROGRESS NOTES
[facility]    Nephrology Progress Note     Assessment & Plan   63 y.o man with ESRD and on PD     ESRD:      PD prescripton:  9 hrs  4 cylces  5805-9994 ml per cycle  Last fill of Extraneal of 1200 ml-He drains this around noon. We omit this step in hospital as we do not have Extraneal.    He does not seem to have much fluid retention.       Severe PAD with left great toe wound     Anemia  CAD, EF of 20-25%: Seems like he is well compensated  CKD-MBD:               -Sensipar               -calcitriol     Plan:     Same PD Rx tonight.         Harish Minor MD  [facility] - Nephrology  [phone]    Interval History     Arnulfo feels fine.  No chest pain.  No SOB    Total fluid off ~600 mL last night.  Weight by bed scale is up to 84.5 kg.       Physical Exam   Temp: 98.1  F (36.7  C) Temp src: Oral BP: 132/88 Pulse: 87   Resp: 16 SpO2: 96 % O2 Device: None (Room air)    Vitals:    10/19/24 1225 10/20/24 0956 10/22/24 0350   Weight: 79.8 kg (176 lb) 81.2 kg (179 lb) 84.5 kg (186 lb 4.6 oz)     Vital Signs with Ranges  Temp:  [98.1  F (36.7  C)-98.7  F (37.1  C)] 98.1  F (36.7  C)  Pulse:  [78-92] 87  Resp:  [14-18] 16  BP: ()/() 132/88  SpO2:  [95 %-99 %] 96 %  I/O last 3 completed shifts:  In: 360 [P.O.:360]  Out: 200 [Urine:200]    GENERAL APPEARANCE: pleasant, NAD, a & o  HEENT:  Eyes/ears/nose/neck grossly normal  RESP: lungs cta b c good efforts, no crackles, rhonchi or wheezes  CV: RRR, nl S1/S2, no m/r/g   ABDOMEN: o/s/nt/nd, bs present  EXTREMITIES/SKIN: no rashes/lesions; no edema    Medications   Current Facility-Administered Medications   Medication Dose Route Frequency Provider Last Rate Last Admin    dianeal PD LOW calcium-2.5% dex (calcium 2.5 mEq/L) 6,000 mL PERITONEAL DIALYSATE   Dialysis [facility] Supplied PD Harish Minor MD        heparin 25,000 units in 0.45% NaCl 250 mL ANTICOAGULANT infusion  0-5,000 Units/hr Intravenous Continuous Benjie Collins MD  12.5 mL/hr at 10/21/24 2348 1,250 Units/hr at 10/21/24 2348    Patient is already receiving anticoagulation with heparin, enoxaparin (LOVENOX), warfarin (COUMADIN)  or other anticoagulant medication   Does not apply Continuous PRN Chai España MD        sodium chloride 0.9 % infusion   Intravenous Continuous Chai España MD         Current Facility-Administered Medications   Medication Dose Route Frequency Provider Last Rate Last Admin    acetaminophen (TYLENOL) tablet 1,000 mg  1,000 mg Oral BID Chai España MD   1,000 mg at 10/22/24 0833    allopurinol (ZYLOPRIM) tablet 200 mg  200 mg Oral QPM Chai España MD   200 mg at 10/21/24 2054    amoxicillin-clavulanate (AUGMENTIN) 500-125 MG per tablet 1 tablet  1 tablet Oral Q24H [facility] Laura Cuellar MD   1 tablet at 10/22/24 1450    aspirin EC tablet 81 mg  81 mg Oral Daily Benjie Collins MD   81 mg at 10/22/24 0832    atorvastatin (LIPITOR) tablet 40 mg  40 mg Oral At Bedtime Chai España MD   40 mg at 10/21/24 2104    calcitRIOL (ROCALTROL) capsule 0.25 mcg  0.25 mcg Oral At Bedtime Chai España MD   0.25 mcg at 10/21/24 2103    carvedilol (COREG) tablet 6.25 mg  6.25 mg Oral At Bedtime Chai España MD   6.25 mg at 10/21/24 2103    cinacalcet (SENSIPAR) tablet 60 mg  60 mg Oral Once per day on Monday Wednesday Friday Chai España MD   60 mg at 10/21/24 0928    insulin aspart (NovoLOG) injection (RAPID ACTING)  1-7 Units Subcutaneous TID AC Chai España MD   1 Units at 10/21/24 1340    insulin aspart (NovoLOG) injection (RAPID ACTING)  1-5 Units Subcutaneous At Bedtime Chai España MD   1 Units at 10/21/24 2148    pantoprazole (PROTONIX) EC tablet 40 mg  40 mg Oral BID Chai España MD   40 mg at 10/22/24 0833    polyethylene glycol (MIRALAX) Packet 17 g  17 g Oral Daily Benjie Collins MD   17 g at 10/22/24 0831    sacubitril-valsartan (ENTRESTO) 49-51 MG per tablet 1 tablet  1 tablet Oral BID Karina  MD Benjie   1 tablet at 10/22/24 0832    sevelamer carbonate (RENVELA) tablet 800 mg  800 mg Oral TID w/meals Chai España MD   800 mg at 10/22/24 1450    sodium chloride (PF) 0.9% PF flush 10 mL  10 mL Intravenous Once Kylie Boateng PA-C        sodium chloride (PF) 0.9% PF flush 3 mL  3 mL Intravenous Q8H Chai España MD   3 mL at 10/22/24 1447    sodium chloride (PF) 0.9% PF flush 3 mL  3 mL Intracatheter Q8H Chai España MD   3 mL at 10/21/24 2057       Data   BMP  Recent Labs   Lab 10/22/24  1158 10/22/24  0840 10/22/24  0648 10/22/24  0231 10/21/24  0801 10/21/24  0649 10/20/24  0909 10/20/24  0839 10/19/24  1232 10/19/24  0924   NA  --   --  136  --   --  134*  --  135  --  135   POTASSIUM  --   --  4.0  --   --  4.2  --  4.3  --  4.7   CHLORIDE  --   --  97*  --   --  97*  --  98  --  97*   TERENCE  --   --  7.9*  --   --  8.5*  --  8.2*  --  7.9*   CO2  --   --  27  --   --  25  --  25  --  27   BUN  --   --  44.9*  --   --  46.0*  --  48.7*  --  51.3*   CR  --   --  8.16*  --   --  8.43*  --  8.75*  --  9.04*   * 139* 190* 229*   < > 257*   < > 213*   < > 152*    < > = values in this interval not displayed.     [email or web address](phos:4)  CBC)  Recent Labs   Lab 10/20/24  0839 10/16/24  0642   WBC 6.3 5.3   HGB 10.4* 10.9*   HCT 31.2* 33.4*   * 102*    271         Attestation:   I have reviewed today's relevant vital signs, notes, medications, labs and imaging.

## 2024-10-22 NOTE — PROGRESS NOTES
".MD Notification    Notified Person: MD    Notified Person Name:Nadia Ramirez    Notification Date/Time:2204. 10/21/2024    Notification Interaction: le    Purpose of Notification:  Writer:Good evening, patient is complaining of pain on his Left great toe wound. he refused scheduled Tylenol, stating that \"it does not do anything\". He mentioned that Tylenol #3 has helped before but was discontinued on 10/19. Please advise. Thanks  Orders Received:     Comments @2211 : I'll put in a 1 time order of tylenol 3, but will defer the rest to the day team  "

## 2024-10-22 NOTE — PROGRESS NOTES
Lexiscan Stress: Pt tolerated well. VSS. Pt denied chest pain or pressure prior to injection. After injection pt c/o abd discomfort, light headed and slight SOB. All sx's resolved within 5 min    1330 Pt transferred back to Rm 614 per w/c. Detailed report called to Gilma GREGG.

## 2024-10-22 NOTE — CONSULTS
[facility]  Cardiology Consultation     Date of Admission:  10/15/2024  Date of Consult (When I saw the patient): 10/22/24  Reason for Consult: pre-op evaluation     Primary Cardiologist: Syeda    History of Present Illness   Arnulfo Pritchett is a 64 year old male who was transferred from [facility] with worsening left lower extremity pain and redness.  Vascular surgery was consulted and he he is planned for bypass surgery.  Cardiology is asked to see patient for preoperative evaluation.    The patient's past medical history is notable for CAD (history of inferior STEMI in 2017 receiving PCI to RCA and most recently in 2021 he received PCI to OM1 and mid left circumflex; no recent ischemic evaluation), ischemic cardiomyopathy (LVEF has been 25-30% over the last several years; on maximally tolerated GDMT), significant history of PAD, TALI (compliant with CPAP), hypertension, hyperlipidemia, ESRD (on peritoneal dialysis), insomnia, persistent atrial fibrillation (on rate control and warfarin for thromboembolic prophylaxis; on heparin bridging currently), and GERD.    The patient denies recent history of chest discomfort or MORTENSEN.  His main concern has been lower extremity pain and redness.  His blood pressure has been on the soft side and thus some of his GDMT agents have been put on hold.  He is currently on IV antibiotics.  The vascular surgery team is hoping to do bypass soon during current admission.    -------------------------------------------------------------------------------------------    Assessment:  Arnulfo Pritchett is a 64 year old male who was admitted on 10/15/2024. I was asked to see the patient for preoperative evaluation.    #Left lower extremity redness and pain in the setting of known PAD and previous stenting and balloon angioplasties  - He is planned to have bypass surgery soon but the primary team has asked cardiology to perform preoperative  "evaluation    #CAD  - No reports of angina currently  - No recent ischemic evaluation  - Most recent PCI in 2021    #Severe ischemic cardiomyopathy  - On maximally tolerated GDMT  - Unclear if ICD was discussed in the past    #Persistent atrial fibrillation  - PTA regimen includes warfarin and BB    #ESRD    #TALI    #Hyperlipidemia    #Hypertension    #T2DM    -------------------------------------------------------------------------------------------    Plan:   - Lexiscan study tomorrow (10/23)  - Cardiology will continue to follow along  - Resume GDMT once has more robust blood pressure    -------------------------------------------------------------------------------------------    Clinically Significant Risk Factors     # Coagulation Defect: INR = 1.32 (Ref range: 0.85 - 1.15) and/or PTT = N/A, will monitor for bleeding    # Hypertension: Noted on problem list           # Overweight: Estimated body mass index is 25.51 kg/m  as calculated from the following:    Height as of 10/8/24: 1.82 m (5' 11.65\").    Weight as of this encounter: 84.5 kg (186 lb 4.6 oz).      # Financial/Environmental Concerns: none        Native vessel CAD    Chronic Fatigue and Other Debilities: Bed confinement status        Code Status    Full Code    Past Medical History   I have reviewed this patient's medical history and updated it with pertinent information if needed.   Past Medical History:   Diagnosis Date    CAD (coronary artery disease)     Chronic systolic heart failure (H)     ESRD (end stage renal disease) on dialysis (H)     Gastroesophageal reflux disease without esophagitis     Gout     HLD (hyperlipidemia)     TALI (obstructive sleep apnea)     PAD (peripheral artery disease) (H)     S/p RLE stenting of SFA/popliteal arteries    Type 2 diabetes mellitus with diabetic neuropathy (H)        Past Surgical History   Past Surgical History:   Procedure Laterality Date    IR LOWER EXTREMITY ANGIOGRAM LEFT  10/17/2024       Prior to " Admission Medications   Prior to Admission Medications   Prescriptions Last Dose Informant Patient Reported? Taking?   B Complex-C-Folic Acid (DIALYVITE) TABS Past Week Self Yes Yes   Sig: Take 1 tablet by mouth daily.   SEVELAMER CARBONATE PO 10/14/2024 Self Yes Yes   Sig: Take 800 mg by mouth 3 times daily (with meals)   acetaminophen (TYLENOL) 500 MG tablet 10/15/2024 at am Self Yes Yes   Sig: Take 1,000 mg by mouth 2 times daily.   acetaminophen-codeine (TYLENOL #3) 300-30 MG per tablet 10/14/2024 at PRN  Yes Yes   Sig: Take 1 tablet by mouth every 6 hours as needed for severe pain.   allopurinol (ZYLOPRIM) 100 MG tablet 10/14/2024 at pm Self Yes Yes   Sig: Take 200 mg by mouth every evening   atorvastatin (LIPITOR) 40 MG tablet 10/14/2024 at pm Self Yes Yes   Sig: Take 40 mg by mouth at bedtime.   calcitRIOL (ROCALTROL) 0.25 MCG capsule 10/14/2024 at pm Self Yes Yes   Sig: Take 0.25 mcg by mouth at bedtime.   carvedilol (COREG) 25 MG tablet 10/14/2024 at pm Self Yes Yes   Sig: Take 12.5 mg by mouth at bedtime.   cephALEXin (KEFLEX) 250 MG capsule 10/15/2024 at am Self No Yes   Sig: Take 1 capsule (250 mg) by mouth 2 times daily for 10 days.   cinacalcet (SENSIPAR) 60 MG tablet 10/14/2024 Self Yes Yes   Sig: Take 60 mg by mouth. Take 1 tablet (60 mg) three times a week: Monday, Wednesday, and Friday nights   clopidogrel (PLAVIX) 75 MG tablet 10/14/2024 at pm  Yes Yes   Sig: Take 75 mg by mouth every evening.   gentamicin (GARAMYCIN) 0.1 % external cream  Self Yes No   Sig: Apply topically daily. Apply topically on peritoneal access site to prevent infections   insulin glargine (LANTUS PEN) 100 UNIT/ML pen 10/14/2024 at pm Self Yes Yes   Sig: Inject 39 Units subcutaneously at bedtime.   melatonin 5 MG tablet 10/14/2024 at pm Self Yes Yes   Sig: Take 5 mg by mouth at bedtime   omeprazole (PRILOSEC) 40 MG DR capsule 10/14/2024 Self Yes Yes   Sig: Take 40 mg by mouth daily.   sacubitril-valsartan (ENTRESTO) 49-51 MG  per tablet 10/15/2024 at am Self Yes Yes   Sig: Take 1 tablet by mouth 2 times daily   torsemide (DEMADEX) 100 MG tablet 10/15/2024 at am Self Yes Yes   Sig: Take 100 mg by mouth every morning   warfarin ANTICOAGULANT (COUMADIN) 5 MG tablet 10/14/2024 at pm Self Yes Yes   Sig: Take by mouth daily. Take 5 mg MWF nights & 1.5 tablet ROW      Facility-Administered Medications: None     Allergies   No Known Allergies    Social History    Arnulfo Pritchett  reports that he has quit smoking. His smoking use included cigarettes. He has never used smokeless tobacco. He reports that he does not currently use alcohol.    Family History   No family history on file.    --------------------------------------------------------------------------------------------    Review of Systems   The 10 point Review of Systems is negative other than noted in the HPI or here.     Temp:  [98.1  F (36.7  C)-98.7  F (37.1  C)] 98.1  F (36.7  C)  Pulse:  [88-92] 88  Resp:  [14-16] 16  BP: ()/(66-92) 93/66  SpO2:  [95 %-98 %] 98 %  186 lbs 4.62 oz    Constitutional: NAD.  Comfortable at rest. Cooperative, alert and oriented, well developed, well nourished.   Respiratory: Clear to auscultation bilaterally  Cardiovascular: No JVD.  Regular rate and rhythm without murmur rub or gallop  GI: Soft, nontender, no hepatosplenomegaly, no masses, active bowel sounds.    Skin: Erythema noted over the left lower extremity.  Musculoskeletal: Normal muscle tone and strength.   Neuropsychiatric: Oriented to time place and person.  Affect normal.  No gross motor deficits.  Extremities: Trace pitting edema of left lower extremity.  No pitting edema of right lower extremity.      Data   Reviewed.    Kylie Boateng PA-C   10/22/2024

## 2024-10-22 NOTE — PROGRESS NOTES
"BRIEF NUTRITION ASSESSMENT    REASON FOR ASSESSMENT:  Arnulfo Pritchett is a 64 year old male seen by Registered Dietitian for LOS.    PMH of ESRD on peritoneal dialysis, CAD, Afib, PAD, GERD, gout, HFrEF, HTN, T2DM, CAD, HLD. Presents with worsening L great toe wound.    NUTRITION HISTORY:  - Spoke with patient at bedside. He said he was eating fine, normally PTA. He hasn't noticed any recent weight loss. He did say he has had a lower appetite this admit due to not liking the hospital food and being stressed. Did not want any supplements at this time.    CURRENT DIET AND INTAKE:  Diet: 2g Na, 2g K Diet              - Mostly % intakes documented per nursing flowsheets    OBTAINED FROM CHART  WOC not following but Podiatry and Vascular Surgery is. Looks like LLE bypass scheduled for tomorrow.    ANTHROPOMETRICS:  Height: 5'11\"  Weight: 76.7 kg (169 lb 1.5 oz) 10/16  BMI 23.6  Weight Status: Overweight BMI 25-29.9  IBW:  78.2 kg  %IBW: 98%  Weight History: Weight fluctuations likely due to fluid status shifts (peritoneal dialysis, HFpEF). Per patient, he has not noticed any weight loss recently.  Wt Readings from Last 10 Encounters:   10/22/24 84.5 kg (186 lb 4.6 oz)   10/15/24 78.6 kg (173 lb 4.5 oz)   10/08/24 78.7 kg (173 lb 8 oz)   10/07/24 80.7 kg (178 lb)   08/12/24 80.7 kg (178 lb)   07/09/24 80.3 kg (177 lb)     LABS:  Labs noted    MALNUTRITION:  Patient does not meet two of the following criteria necessary for diagnosing malnutrition.     % Weight Loss:  None noted per pt  % Intake:  No decreased intake noted per pt  Subcutaneous Fat Loss:  None observed  Muscle Loss:  None observed  Fluid Retention:  None noted    NUTRITION INTERVENTION:  Nutrition Diagnosis:  No nutrition diagnosis at this time.    Implementation:  Nutrition Education: Per Provider order if indicated    FOLLOW UP/MONITORING:   Will re-evaluate in 7 - 10 days, or sooner, if re-consulted.    Alyssa Walters RD, LD  Clinical Dietitian - " [facility]

## 2024-10-22 NOTE — SIGNIFICANT EVENT
At 2205, I was notified by bedside RN of patient complaining of pain to his L toe. He refuses to take Tylenol (was not given at 2100) as he states it has no effects on him. I informed RN that I would order a one time order of Tylenol 3 and defer pain regiment to day team.     Nadia Jama DO  [facility]

## 2024-10-22 NOTE — PLAN OF CARE
Goal Outcome Evaluation:      Orientation: A&Ox4  Behavior & Aggression: green  Activity: SBA, quad cane  Diet: 2 g Na. 2g K. NPO at MN.   Fall risk: yes  Isolation: N/A  Pain: denies  B&B: continent, no BM this shift;   VS/O2: VSS, RA.  IV: R PIV x2; one infusing heparin @ 1250 units/hr; other SL.  Drains/Devices: LLQ PD site.  Tele: N/A  Abnormal Labs: hep Xa redraw in AM.  Skin: Dusky in color. LLE great toe black w/ dry scab gangrenous, luis streaking extending up foot and into shin. Dry/flaky heels/feet, scattered bruising.  Consults/Procedures: planned vascular surgery procedure on 10/23  D/C Date: TBD  Other: has nightly peritoneal dialysis . Cardio consult completed: Lexiscan and EKG (completed) prior to surgery. IV abx DC'd, on PO abx

## 2024-10-23 LAB
ALBUMIN SERPL BCG-MCNC: 2.1 G/DL (ref 3.5–5.2)
ANION GAP SERPL CALCULATED.3IONS-SCNC: 11 MMOL/L (ref 7–15)
BUN SERPL-MCNC: 41.7 MG/DL (ref 8–23)
CALCIUM SERPL-MCNC: 8.5 MG/DL (ref 8.8–10.4)
CHLORIDE SERPL-SCNC: 97 MMOL/L (ref 98–107)
CREAT SERPL-MCNC: 8.13 MG/DL (ref 0.67–1.17)
EGFRCR SERPLBLD CKD-EPI 2021: 7 ML/MIN/1.73M2
ERYTHROCYTE [DISTWIDTH] IN BLOOD BY AUTOMATED COUNT: 14.2 % (ref 10–15)
GLUCOSE BLDC GLUCOMTR-MCNC: 119 MG/DL (ref 70–99)
GLUCOSE BLDC GLUCOMTR-MCNC: 137 MG/DL (ref 70–99)
GLUCOSE BLDC GLUCOMTR-MCNC: 159 MG/DL (ref 70–99)
GLUCOSE BLDC GLUCOMTR-MCNC: 168 MG/DL (ref 70–99)
GLUCOSE BLDC GLUCOMTR-MCNC: 188 MG/DL (ref 70–99)
GLUCOSE SERPL-MCNC: 218 MG/DL (ref 70–99)
HCO3 SERPL-SCNC: 27 MMOL/L (ref 22–29)
HCT VFR BLD AUTO: 30.9 % (ref 40–53)
HGB BLD-MCNC: 10.3 G/DL (ref 13.3–17.7)
INR PPP: 1.21 (ref 0.85–1.15)
MCH RBC QN AUTO: 33.3 PG (ref 26.5–33)
MCHC RBC AUTO-ENTMCNC: 33.3 G/DL (ref 31.5–36.5)
MCV RBC AUTO: 100 FL (ref 78–100)
PHOSPHATE SERPL-MCNC: 5.3 MG/DL (ref 2.5–4.5)
PLATELET # BLD AUTO: 264 10E3/UL (ref 150–450)
POTASSIUM SERPL-SCNC: 3.8 MMOL/L (ref 3.4–5.3)
RBC # BLD AUTO: 3.09 10E6/UL (ref 4.4–5.9)
SODIUM SERPL-SCNC: 135 MMOL/L (ref 135–145)
UFH PPP CHRO-ACNC: 0.3 IU/ML
WBC # BLD AUTO: 6.5 10E3/UL (ref 4–11)

## 2024-10-23 PROCEDURE — 250N000013 HC RX MED GY IP 250 OP 250 PS 637: Performed by: STUDENT IN AN ORGANIZED HEALTH CARE EDUCATION/TRAINING PROGRAM

## 2024-10-23 PROCEDURE — 250N000013 HC RX MED GY IP 250 OP 250 PS 637: Performed by: INTERNAL MEDICINE

## 2024-10-23 PROCEDURE — 250N000013 HC RX MED GY IP 250 OP 250 PS 637: Performed by: SPECIALIST

## 2024-10-23 PROCEDURE — 250N000011 HC RX IP 250 OP 636: Performed by: STUDENT IN AN ORGANIZED HEALTH CARE EDUCATION/TRAINING PROGRAM

## 2024-10-23 PROCEDURE — 85027 COMPLETE CBC AUTOMATED: CPT | Performed by: STUDENT IN AN ORGANIZED HEALTH CARE EDUCATION/TRAINING PROGRAM

## 2024-10-23 PROCEDURE — 36415 COLL VENOUS BLD VENIPUNCTURE: CPT | Performed by: INTERNAL MEDICINE

## 2024-10-23 PROCEDURE — 99232 SBSQ HOSP IP/OBS MODERATE 35: CPT | Performed by: INTERNAL MEDICINE

## 2024-10-23 PROCEDURE — 120N000001 HC R&B MED SURG/OB

## 2024-10-23 PROCEDURE — 80069 RENAL FUNCTION PANEL: CPT | Performed by: STUDENT IN AN ORGANIZED HEALTH CARE EDUCATION/TRAINING PROGRAM

## 2024-10-23 PROCEDURE — 85610 PROTHROMBIN TIME: CPT | Performed by: INTERNAL MEDICINE

## 2024-10-23 PROCEDURE — 85520 HEPARIN ASSAY: CPT | Performed by: INTERNAL MEDICINE

## 2024-10-23 RX ORDER — SENNOSIDES 8.6 MG
8.6 TABLET ORAL 2 TIMES DAILY
Status: DISCONTINUED | OUTPATIENT
Start: 2024-10-23 | End: 2024-10-25

## 2024-10-23 RX ORDER — BISACODYL 10 MG
10 SUPPOSITORY, RECTAL RECTAL ONCE
Status: COMPLETED | OUTPATIENT
Start: 2024-10-23 | End: 2024-10-23

## 2024-10-23 RX ORDER — HYDROMORPHONE HYDROCHLORIDE 1 MG/ML
0.5 SOLUTION ORAL EVERY 6 HOURS PRN
Status: DISCONTINUED | OUTPATIENT
Start: 2024-10-23 | End: 2024-10-24

## 2024-10-23 RX ORDER — GENTAMICIN SULFATE 1 MG/G
CREAM TOPICAL DAILY PRN
Status: DISCONTINUED | OUTPATIENT
Start: 2024-10-23 | End: 2024-10-26

## 2024-10-23 RX ADMIN — AMOXICILLIN AND CLAVULANATE POTASSIUM 1 TABLET: 500; 125 TABLET, FILM COATED ORAL at 09:32

## 2024-10-23 RX ADMIN — SEVELAMER CARBONATE 800 MG: 800 TABLET, FILM COATED ORAL at 18:35

## 2024-10-23 RX ADMIN — ASPIRIN 81 MG: 81 TABLET, COATED ORAL at 09:31

## 2024-10-23 RX ADMIN — ACETAMINOPHEN 1000 MG: 500 TABLET, FILM COATED ORAL at 09:30

## 2024-10-23 RX ADMIN — CARVEDILOL 6.25 MG: 6.25 TABLET, FILM COATED ORAL at 21:15

## 2024-10-23 RX ADMIN — HYDROMORPHONE HYDROCHLORIDE 0.5 MG: 1 SOLUTION ORAL at 21:14

## 2024-10-23 RX ADMIN — ATORVASTATIN CALCIUM 40 MG: 40 TABLET, FILM COATED ORAL at 21:15

## 2024-10-23 RX ADMIN — PANTOPRAZOLE SODIUM 40 MG: 40 TABLET, DELAYED RELEASE ORAL at 09:31

## 2024-10-23 RX ADMIN — GENTAMICIN SULFATE: 1 CREAM TOPICAL at 21:29

## 2024-10-23 RX ADMIN — CINACALCET 60 MG: 30 TABLET ORAL at 09:32

## 2024-10-23 RX ADMIN — HEPARIN SODIUM 1250 UNITS/HR: 10000 INJECTION, SOLUTION INTRAVENOUS at 16:35

## 2024-10-23 RX ADMIN — CALCITRIOL CAPSULES 0.25 MCG 0.25 MCG: 0.25 CAPSULE ORAL at 21:15

## 2024-10-23 RX ADMIN — SEVELAMER CARBONATE 800 MG: 800 TABLET, FILM COATED ORAL at 09:36

## 2024-10-23 RX ADMIN — PANTOPRAZOLE SODIUM 40 MG: 40 TABLET, DELAYED RELEASE ORAL at 20:50

## 2024-10-23 RX ADMIN — SEVELAMER CARBONATE 800 MG: 800 TABLET, FILM COATED ORAL at 13:27

## 2024-10-23 RX ADMIN — SACUBITRIL AND VALSARTAN 1 TABLET: 49; 51 TABLET, FILM COATED ORAL at 09:31

## 2024-10-23 RX ADMIN — ALLOPURINOL 200 MG: 100 TABLET ORAL at 20:50

## 2024-10-23 RX ADMIN — SACUBITRIL AND VALSARTAN 1 TABLET: 49; 51 TABLET, FILM COATED ORAL at 20:50

## 2024-10-23 RX ADMIN — POLYETHYLENE GLYCOL 3350 17 G: 17 POWDER, FOR SOLUTION ORAL at 09:29

## 2024-10-23 ASSESSMENT — ACTIVITIES OF DAILY LIVING (ADL)
ADLS_ACUITY_SCORE: 0
ADLS_ACUITY_SCORE: 31
ADLS_ACUITY_SCORE: 0

## 2024-10-23 NOTE — PLAN OF CARE
Goal Outcome Evaluation:         Date/Time: 10/22/24 3537-4904     Summary: 62 y/o M/PMH of ESRD on peritoneal dialysis, CAD, A fib, PAD who   Mr. Arnulfo Pritchett is a 64 yo male with Afib on warfarin, GERD, gout, insomnia, ESRD on PD, HFrEF, HTN, IDDM, diabetic peripheral neuropathy, CAD s/p stent placement x 3 (last 2021), TALI, HLD, and RLE PAD s/p balloon angio and stenting of SFA/popliteal arteries. Presented to [facility]'s ED with worsening pain and redness to L great toe and streaking up leg ongoing for 2 days.    Diagnosis: gangrene of L great toe.  Orientation: A&Ox4  Behavior & Aggression: green  Activity: SBA, quad cane  Diet: 2 g Na  Fall risk: yes  Isolation: N/A  Pain: denies  B&B: continent, no BM this shift;   VS/O2: VSS, RA.  IV: R PIV x2; one infusing heparin @ 1250 units/hr; other SL.  Drains/Devices: LLQ PD site started overnight PD at 2110  Tele: N/A  Labs: hep Xa redraw in AM.   Skin: Dusky in color. LLE great toe black w/ dry scab gangrenous, luis streaking extending up foot and into shin. Dry/flaky heels/feet, scattered bruising.  Consults/Procedures: planned vascular surgery procedure on 10/25  D/C Date: TBD  Other: has nightly peritoneal dialysis . Cardio consult completed: Lexiscan, and EKG prior to surgery: completed. IV abx DC'd, on PO abx

## 2024-10-23 NOTE — PLAN OF CARE
Summary: 62 y/o M/PMH of ESRD on peritoneal dialysis, CAD, A fib, PAD who   Mr. Arnulfo Pritchett is a 64 yo male with Afib on warfarin, GERD, gout, insomnia, ESRD on PD, HFrEF, HTN, IDDM, diabetic peripheral neuropathy, CAD s/p stent placement x 3 (last 2021), TALI, HLD, and RLE PAD s/p balloon angio and stenting of SFA/popliteal arteries. Presented to [facility]'s ED with worsening pain and redness to L great toe and streaking up leg ongoing for 2 days.    Diagnosis: gangrene of L great toe.    Date/Time: 10/22/24 0674-4402  Orientation: Alert/oriented x 4  Behavior & Aggression: green  Activity: SBA, quad cane  Diet: 2 g Na  Fall risk: yes  Isolation: N/A  Pain: denies  B&B: continent, no BM this shift;   VS/O2: JONATHAN 143/97 otherwise VSS, room air  IV: R wrist PIV infusing heparin at 1250 units/hour; R upper arm saline locked  Drains/Devices: [facility] PD site started overnight PD at 2110--will call when he wants to be disconnected  Labs: hep Xa (0.3-0.7 goal) redraw in AM--2 results within parameters;   Skin: Dusky in color. LLE great toe black w/ dry scab gangrenous, luis streaking extending up foot and into shin. Dry/flaky heels/feet, scattered bruising.  Consults/Procedures: planned vascular surgery procedure on 10/25  D/C Date: TBD  Other: Overnight peritoneal dialysis; Cardio consult completed: Lexiscan, and EKG prior to surgery: completed. IV abx DC'd, on PO augmentin

## 2024-10-23 NOTE — PLAN OF CARE
Goal Outcome Evaluation:      Orientation: A&Ox4  Behavior & Aggression: green  Activity: SBA, quad cane  Diet: 2 g Na. 2g K.   Fall risk: yes  Isolation: N/A  Pain: denies  B&B: continent, no BM this shift;   VS/O2: VSS, RA.  IV: R PIV x2; one infusing heparin @ 1250 units/hr; other SL.  Drains/Devices: LLQ PD site., piv X2  Tele: N/A  Abnormal Labs: hep Xa redraw in AM.  Skin: Dusky in color. LLE great toe black w/ dry scab gangrenous, luis streaking extending up foot and into shin. Dry/flaky heels/feet, scattered bruising.  Consults/Procedures: planned vascular surgery procedure on 10/25  D/C Date: TBD  Other: has nightly peritoneal dialysis(dressing changed this AM per pt request) . Pedal pulse on R +1, L doppler. Cardio consult Lexiscan and EKG completed. IV abx switched to  PO

## 2024-10-23 NOTE — PROGRESS NOTES
Cycler set up per protocol and per treatment orders     Results from previous treatment:  Effluent color and clarity: Clear and rolan, with a tinge of blood.  No fibrin noted  Total UF: 393   Initial Drain: 1:02  Avg Dwell: 1:09  Lost Dwell: 1:06    Cycler Serial Number: [id number]  Total Treatment Volume: 85425mO  Total treatment time: 9 hrs  Fill Volume: 2000ml  Last Fill Volume:0ml  Heater ba.5% 6000mL;  Lot #: [id number];  Expiration Date:  Side Bag #1: 2.5% 6000mL;  Lot #: [id number];  Expiration Date:     Number of cycles including final fill: 5  Dwell Time: 1:22 minutes  Initial Drain Alarm: 0ml  PD orders reviewed with Patient procedure and ESRD teaching done and questions answered.  Cycler ready for hook-up.    Report given to: Abi HAYNES RN

## 2024-10-23 NOTE — PROGRESS NOTES
Brief Cardiology Note-     Lexiscan study showed no ischemia. He has inferolateral and apical infarcts. No TID present. Overall no high risk findings. Patient does not further ischemic work up prior to vascular surgery. Given patient's depressed LVEF he is at higher risk for volume overload and thus recommend close monitoring of symptoms and weight trend. Resume and continue pta GDMT if able. Patient should have hospital follow up with his cardiology team through [facility]. Cardiology will sign off.     Kylie Boateng PA-C   10/23/2024

## 2024-10-23 NOTE — PLAN OF CARE
Goal Outcome Evaluation:       Orientation: Alert/oriented x 4  Behavior & Aggression: green  Activity: SBA, quad cane  Diet: 2 g Na  Fall risk: yes  Isolation: N/A  Pain: denies  B&B: continent, no BM this shift;   VS/O2: /112 otherwise VSS, on room air  IV: R wrist PIV infusing heparin at 1250 units/hour; R upper arm saline locked  Drains/Devices: LLQ PD site, overnight PD; Labs: hep Xa (0.3-0.7 goal) redraw in AM 0.30. results within parameters; ; 159  Skin: Dusky in color. LLE great toe black w/ dry scab gangrenous, luis streaking extending up foot and into shin. Dry/flaky heels/feet, scattered bruising.  Consults/Procedures: planned vascular surgery procedure on 10/25  D/C Date: TBD  Other: Overnight peritoneal dialysis; Cardio signed off and cleared for surgery on Friday. on PO augmentin

## 2024-10-24 ENCOUNTER — ANESTHESIA EVENT (OUTPATIENT)
Dept: SURGERY | Facility: CLINIC | Age: 64
End: 2024-10-24
Payer: MEDICARE

## 2024-10-24 LAB
ALBUMIN SERPL BCG-MCNC: 2.1 G/DL (ref 3.5–5.2)
ANION GAP SERPL CALCULATED.3IONS-SCNC: 12 MMOL/L (ref 7–15)
BUN SERPL-MCNC: 40.5 MG/DL (ref 8–23)
CALCIUM SERPL-MCNC: 8.1 MG/DL (ref 8.8–10.4)
CHLORIDE SERPL-SCNC: 98 MMOL/L (ref 98–107)
CREAT SERPL-MCNC: 8.16 MG/DL (ref 0.67–1.17)
EGFRCR SERPLBLD CKD-EPI 2021: 7 ML/MIN/1.73M2
GLUCOSE BLDC GLUCOMTR-MCNC: 149 MG/DL (ref 70–99)
GLUCOSE BLDC GLUCOMTR-MCNC: 159 MG/DL (ref 70–99)
GLUCOSE BLDC GLUCOMTR-MCNC: 167 MG/DL (ref 70–99)
GLUCOSE BLDC GLUCOMTR-MCNC: 196 MG/DL (ref 70–99)
GLUCOSE BLDC GLUCOMTR-MCNC: 231 MG/DL (ref 70–99)
GLUCOSE SERPL-MCNC: 204 MG/DL (ref 70–99)
HCO3 SERPL-SCNC: 28 MMOL/L (ref 22–29)
INR PPP: 1.21 (ref 0.85–1.15)
PHOSPHATE SERPL-MCNC: 4.8 MG/DL (ref 2.5–4.5)
POTASSIUM SERPL-SCNC: 4 MMOL/L (ref 3.4–5.3)
SODIUM SERPL-SCNC: 138 MMOL/L (ref 135–145)
UFH PPP CHRO-ACNC: 0.22 IU/ML
UFH PPP CHRO-ACNC: 0.39 IU/ML
UFH PPP CHRO-ACNC: 0.43 IU/ML

## 2024-10-24 PROCEDURE — 250N000013 HC RX MED GY IP 250 OP 250 PS 637: Performed by: STUDENT IN AN ORGANIZED HEALTH CARE EDUCATION/TRAINING PROGRAM

## 2024-10-24 PROCEDURE — 80069 RENAL FUNCTION PANEL: CPT | Performed by: STUDENT IN AN ORGANIZED HEALTH CARE EDUCATION/TRAINING PROGRAM

## 2024-10-24 PROCEDURE — 250N000011 HC RX IP 250 OP 636: Performed by: STUDENT IN AN ORGANIZED HEALTH CARE EDUCATION/TRAINING PROGRAM

## 2024-10-24 PROCEDURE — 85520 HEPARIN ASSAY: CPT | Performed by: INTERNAL MEDICINE

## 2024-10-24 PROCEDURE — 36415 COLL VENOUS BLD VENIPUNCTURE: CPT | Performed by: INTERNAL MEDICINE

## 2024-10-24 PROCEDURE — 250N000013 HC RX MED GY IP 250 OP 250 PS 637: Performed by: SPECIALIST

## 2024-10-24 PROCEDURE — 85610 PROTHROMBIN TIME: CPT | Performed by: INTERNAL MEDICINE

## 2024-10-24 PROCEDURE — 250N000013 HC RX MED GY IP 250 OP 250 PS 637: Performed by: INTERNAL MEDICINE

## 2024-10-24 PROCEDURE — 99232 SBSQ HOSP IP/OBS MODERATE 35: CPT | Performed by: INTERNAL MEDICINE

## 2024-10-24 PROCEDURE — 120N000001 HC R&B MED SURG/OB

## 2024-10-24 RX ORDER — GENTAMICIN SULFATE 1 MG/G
CREAM TOPICAL DAILY PRN
Status: DISCONTINUED | OUTPATIENT
Start: 2024-10-24 | End: 2024-10-24

## 2024-10-24 RX ORDER — HYDROMORPHONE HYDROCHLORIDE 1 MG/ML
1 SOLUTION ORAL EVERY 6 HOURS PRN
Status: DISCONTINUED | OUTPATIENT
Start: 2024-10-24 | End: 2024-10-25 | Stop reason: ALTCHOICE

## 2024-10-24 RX ADMIN — SENNOSIDES 8.6 MG: 8.6 TABLET, FILM COATED ORAL at 08:06

## 2024-10-24 RX ADMIN — SEVELAMER CARBONATE 800 MG: 800 TABLET, FILM COATED ORAL at 08:06

## 2024-10-24 RX ADMIN — AMOXICILLIN AND CLAVULANATE POTASSIUM 1 TABLET: 500; 125 TABLET, FILM COATED ORAL at 09:25

## 2024-10-24 RX ADMIN — PANTOPRAZOLE SODIUM 40 MG: 40 TABLET, DELAYED RELEASE ORAL at 21:27

## 2024-10-24 RX ADMIN — SEVELAMER CARBONATE 800 MG: 800 TABLET, FILM COATED ORAL at 17:56

## 2024-10-24 RX ADMIN — HEPARIN SODIUM 1400 UNITS/HR: 10000 INJECTION, SOLUTION INTRAVENOUS at 09:57

## 2024-10-24 RX ADMIN — INSULIN ASPART 1 UNITS: 100 INJECTION, SOLUTION INTRAVENOUS; SUBCUTANEOUS at 09:25

## 2024-10-24 RX ADMIN — HYDROMORPHONE HYDROCHLORIDE 1 MG: 1 SOLUTION ORAL at 21:35

## 2024-10-24 RX ADMIN — SACUBITRIL AND VALSARTAN 1 TABLET: 49; 51 TABLET, FILM COATED ORAL at 21:27

## 2024-10-24 RX ADMIN — POLYETHYLENE GLYCOL 3350 17 G: 17 POWDER, FOR SOLUTION ORAL at 08:05

## 2024-10-24 RX ADMIN — CARVEDILOL 6.25 MG: 6.25 TABLET, FILM COATED ORAL at 21:27

## 2024-10-24 RX ADMIN — SACUBITRIL AND VALSARTAN 1 TABLET: 49; 51 TABLET, FILM COATED ORAL at 08:06

## 2024-10-24 RX ADMIN — INSULIN ASPART 1 UNITS: 100 INJECTION, SOLUTION INTRAVENOUS; SUBCUTANEOUS at 21:29

## 2024-10-24 RX ADMIN — CALCITRIOL CAPSULES 0.25 MCG 0.25 MCG: 0.25 CAPSULE ORAL at 21:27

## 2024-10-24 RX ADMIN — INSULIN ASPART 1 UNITS: 100 INJECTION, SOLUTION INTRAVENOUS; SUBCUTANEOUS at 13:02

## 2024-10-24 RX ADMIN — ALLOPURINOL 200 MG: 100 TABLET ORAL at 21:27

## 2024-10-24 RX ADMIN — ACETAMINOPHEN 1000 MG: 500 TABLET, FILM COATED ORAL at 13:02

## 2024-10-24 RX ADMIN — PANTOPRAZOLE SODIUM 40 MG: 40 TABLET, DELAYED RELEASE ORAL at 08:05

## 2024-10-24 RX ADMIN — ACETAMINOPHEN 1000 MG: 500 TABLET, FILM COATED ORAL at 08:06

## 2024-10-24 RX ADMIN — ASPIRIN 81 MG: 81 TABLET, COATED ORAL at 08:06

## 2024-10-24 RX ADMIN — INSULIN ASPART 1 UNITS: 100 INJECTION, SOLUTION INTRAVENOUS; SUBCUTANEOUS at 17:56

## 2024-10-24 RX ADMIN — SEVELAMER CARBONATE 800 MG: 800 TABLET, FILM COATED ORAL at 13:02

## 2024-10-24 RX ADMIN — ATORVASTATIN CALCIUM 40 MG: 40 TABLET, FILM COATED ORAL at 21:27

## 2024-10-24 RX ADMIN — ACETAMINOPHEN 1000 MG: 500 TABLET, FILM COATED ORAL at 21:26

## 2024-10-24 ASSESSMENT — LIFESTYLE VARIABLES: TOBACCO_USE: 1

## 2024-10-24 NOTE — PLAN OF CARE
Goal Outcome Evaluation:  Summary: 62 y/o M/PMH of ESRD on peritoneal dialysis, CAD, A fib, PAD who   Mr. Arnulfo Pritchett is a 62 yo male with Afib on warfarin, GERD, gout, insomnia, ESRD on PD, HFrEF, HTN, IDDM, diabetic peripheral neuropathy, CAD s/p stent placement x 3 (last 2021), TALI, HLD, and RLE PAD s/p balloon angio and stenting of SFA/popliteal arteries. Presented to [facility]'s ED with worsening pain and redness to L great toe and streaking up leg ongoing for 2 days.    Diagnosis: gangrene of L great toe.    Date/Time: 10/23/24 15:00-23:00  Orientation: Alert/oriented x 4  Behavior & Aggression: green  Activity: SBA,  cane  Diet: 2 g Na, Good appetite.   Fall risk: yes  Isolation: N/A  Pain: denies  B&B: continent, no BM this shift; Refused intervention  VS/O2:  VSS, on room air  IV: R wrist PIV infusing heparin at 1250 units/hour; R upper arm saline locked  Drains/Devices: LLQ PD site, overnight PD; Labs: hep Xa (0.3-0.7 goal) redraw in AM 0.30. results within parameters;   Skin: Dusky in color. LLE great toe black w/ dry scab gangrenous, luis streaking extending up foot and into shin. Dry/flaky heels/feet, scattered bruising.  Consults/Procedures: planned vascular surgery procedure on 10/25  D/C Date: TBD  Other: Overnight peritoneal dialysis; Cardio signed off and cleared for surgery on Friday.

## 2024-10-24 NOTE — PLAN OF CARE
Summary: Presented to [facility] ED with worsening pain and redness to L great toe and streaking up leg ongoing for 2 days.      DATE & TIME: 10/23/24, 1930 - 0730    Cognitive Concerns/ Orientation : A&O x 4   BEHAVIOR & AGGRESSION TOOL COLOR: Green  CIWA SCORE: NA   ABNL VS/O2: Tachycardic at times, BP elevated, scheduled BP medication given. Other VSS on room air  MOBILITY: Up SBA with gait belt and cane  PAIN MANAGMENT: Refused scheduled Tylenol. Prn po Dilaudid given for pain to left foot.   DIET: 2 gram Sodium  BOWEL/BLADDER: Continent of bowel/bladder. Patient reported a medium BM. Refused Senna  ABNL LAB/BG: , 196  DRAIN/DEVICES: Right wrist PIV infusing Heparin at 1250 units/hr. Right upper arm PIV SL. PD catheter to LLQ of abdomen  TELEMETRY RHYTHM: NA  SKIN: Dusky. Scattered bruises. Left great toe black with dry scab, gangrenous, luis streaking extending up foot to shin. Heels/feet are dry and flaky. Dressing to PD site was changed. Site WDL  TESTS/PROCEDURES: Patient had peritoneal dialysis overnight  D/C DATE: TBD  Discharge Barriers: NA  OTHER IMPORTANT INFO: Podiatry following. Patient cleared for surgery on Friday.     Goal Outcome Evaluation:      Plan of Care Reviewed With: patient    Overall Patient Progress: improvingOverall Patient Progress: improving

## 2024-10-24 NOTE — PLAN OF CARE
Goal Outcome Evaluation:     Cognitive Concerns/ Orientation : A&O x 4   BEHAVIOR & AGGRESSION TOOL COLOR: Green  CIWA SCORE: NA   ABNL VS/O2: BP elevated, Other VSS on room air  MOBILITY: Up SBA with gait belt and cane  PAIN MANAGMENT: Headache; PRN Tylenol given  DIET: 2 gram Sodium. NPO at MN  BOWEL/BLADDER: Continent of bowel/bladder. 1x BM   ABNL LAB/BG: Hep: 0.22. Bolus/ Increased to 1400 units/hr. Checked at 2pm; 0.43 recheck at 8:30  DRAIN/DEVICES: Right upper arm  PIV infusing Heparin at 1400 units/hr. Right wrist PIV SL. PD catheter to LLQ of abdomen  TELEMETRY RHYTHM: NA  SKIN: Dusky. Scattered bruises. Left great toe black with dry scab, gangrenous, luis streaking extending up foot to shin. Heels/feet are dry and flaky. Dressing to PD site was changed. Site WDL  TESTS/PROCEDURES: Patient had peritoneal dialysis overnight  D/C DATE: TBD  Discharge Barriers: NA  OTHER IMPORTANT INFO: Surgery axel 10/25/24  Cardio cleared

## 2024-10-24 NOTE — PROGRESS NOTES
[facility]    Nephrology Progress Note     Assessment & Plan       63 y.o man with ESRD and on PD     ESRD:      PD prescripton:  9 hrs  4 cylces  9930-4718 ml per cycle  Last fill of Extraneal of 1200 ml-He drains this around noon. We omit this step in hospital as we do not have Extraneal.    He does not seem to have much fluid retention.  Weights are up and down.  He has no edema and no respiratory symptoms.       Severe PAD with left great toe wound     Anemia  CAD, EF of 20-25%: Seems like he is well compensated  CKD-MBD:               -Sensipar               -calcitriol     Plan:     Same PD Rx tonight.    L femoral to BK popliteal bypass graft tomorrow.           Harish Minor MD  [facility] - Nephrology  [phone]    Interval History     He feels fine.  Weight is down 2.2 kg.      Pt denies f/c/n/v.  No cp/sob/cough.  No dysuria/hematuria/abd or flank pain.    No dizziness, lightheadedness, headaches, or focal neuro sx.      Physical Exam   Temp: 98.2  F (36.8  C) Temp src: Oral BP: (!) 148/95 Pulse: 82   Resp: 18 SpO2: 97 % O2 Device: None (Room air)    Vitals:    10/22/24 0350 10/23/24 0547 10/23/24 2032   Weight: 84.5 kg (186 lb 4.6 oz) 82.1 kg (181 lb) 79.9 kg (176 lb 3.2 oz)     Vital Signs with Ranges  Temp:  [98.2  F (36.8  C)-98.7  F (37.1  C)] 98.2  F (36.8  C)  Pulse:  [] 82  Resp:  [18] 18  BP: (140-149)/() 148/95  SpO2:  [93 %-97 %] 97 %  I/O last 3 completed shifts:  In: 480 [P.O.:480]  Out: 450 [Urine:450]    GENERAL APPEARANCE: pleasant, NAD, a & o  HEENT:  Eyes/ears/nose/neck grossly normal  RESP: lungs cta b c good efforts, no crackles, rhonchi or wheezes  CV: RRR, nl S1/S2, no m/r/g   ABDOMEN: o/s/nt/nd, bs present  EXTREMITIES/SKIN: no rashes/lesions; no edema    Medications   Current Facility-Administered Medications   Medication Dose Route Frequency Provider Last Rate Last Admin    dianeal PD LOW calcium-2.5% dex (calcium 2.5 mEq/L)  6,000 mL PERITONEAL DIALYSATE   Dialysis [facility] Supplied PD Harish Minor MD        dianeal PD LOW calcium-2.5% dex (calcium 2.5 mEq/L) 6,000 mL PERITONEAL DIALYSATE   Dialysis [facility] Supplied PD Harish Minor MD        dianeal PD LOW calcium-2.5% dex (calcium 2.5 mEq/L) 6,000 mL PERITONEAL DIALYSATE   Dialysis [facility] Supplied PD Harish Minor MD        heparin 25,000 units in 0.45% NaCl 250 mL ANTICOAGULANT infusion  0-5,000 Units/hr Intravenous Continuous Benjie Collins MD 14 mL/hr at 10/24/24 0957 1,400 Units/hr at 10/24/24 0957    Patient is already receiving anticoagulation with heparin, enoxaparin (LOVENOX), warfarin (COUMADIN)  or other anticoagulant medication   Does not apply Continuous PRN Chai España MD         Current Facility-Administered Medications   Medication Dose Route Frequency Provider Last Rate Last Admin    acetaminophen (TYLENOL) tablet 1,000 mg  1,000 mg Oral BID Chai España MD   1,000 mg at 10/24/24 0806    allopurinol (ZYLOPRIM) tablet 200 mg  200 mg Oral QPM Chai España MD   200 mg at 10/23/24 2050    amoxicillin-clavulanate (AUGMENTIN) 500-125 MG per tablet 1 tablet  1 tablet Oral Q24H [facility] Laura Cuellar MD   1 tablet at 10/24/24 0925    aspirin EC tablet 81 mg  81 mg Oral Daily Benjie Collins MD   81 mg at 10/24/24 0806    atorvastatin (LIPITOR) tablet 40 mg  40 mg Oral At Bedtime Chai España MD   40 mg at 10/23/24 2115    calcitRIOL (ROCALTROL) capsule 0.25 mcg  0.25 mcg Oral At Bedtime Chai España MD   0.25 mcg at 10/23/24 2115    carvedilol (COREG) tablet 6.25 mg  6.25 mg Oral At Bedtime Chai España MD   6.25 mg at 10/23/24 2115    cinacalcet (SENSIPAR) tablet 60 mg  60 mg Oral Once per day on Monday Wednesday Friday Chai España MD   60 mg at 10/23/24 0932    insulin aspart (NovoLOG) injection (RAPID ACTING)  1-7 Units Subcutaneous TID AC Chai España MD   1 Units at 10/24/24 0925    insulin aspart (NovoLOG) injection  (RAPID ACTING)  1-5 Units Subcutaneous At Bedtime Chai España MD   1 Units at 10/21/24 2148    pantoprazole (PROTONIX) EC tablet 40 mg  40 mg Oral BID Chai España MD   40 mg at 10/24/24 0805    polyethylene glycol (MIRALAX) Packet 17 g  17 g Oral Daily Benjie Collins MD   17 g at 10/24/24 0805    sacubitril-valsartan (ENTRESTO) 49-51 MG per tablet 1 tablet  1 tablet Oral BID Benjie Collins MD   1 tablet at 10/24/24 0806    sennosides (SENOKOT) tablet 8.6 mg  8.6 mg Oral BID Cathi Singleton MD   8.6 mg at 10/24/24 0806    sevelamer carbonate (RENVELA) tablet 800 mg  800 mg Oral TID w/meals Chai España MD   800 mg at 10/24/24 0806    sodium chloride (PF) 0.9% PF flush 10 mL  10 mL Intravenous Once Kylie Boateng PA-C        sodium chloride (PF) 0.9% PF flush 3 mL  3 mL Intravenous Q8H Chai España MD   3 mL at 10/24/24 0646    sodium chloride (PF) 0.9% PF flush 3 mL  3 mL Intracatheter Q8H Chai España MD   3 mL at 10/23/24 1327       Data   BMP  Recent Labs   Lab 10/24/24  0729 10/24/24  0644 10/24/24  0246 10/23/24  2109 10/23/24  0951 10/23/24  0637 10/22/24  0840 10/22/24  0648 10/21/24  0801 10/21/24  0649   NA  --  138  --   --   --  135  --  136  --  134*   POTASSIUM  --  4.0  --   --   --  3.8  --  4.0  --  4.2   CHLORIDE  --  98  --   --   --  97*  --  97*  --  97*   TERENCE  --  8.1*  --   --   --  8.5*  --  7.9*  --  8.5*   CO2  --  28  --   --   --  27  --  27  --  25   BUN  --  40.5*  --   --   --  41.7*  --  44.9*  --  46.0*   CR  --  8.16*  --   --   --  8.13*  --  8.16*  --  8.43*   * 204* 196* 168*   < > 218*   < > 190*   < > 257*    < > = values in this interval not displayed.     [id number](phos:4)  CBC)  Recent Labs   Lab 10/23/24  0637 10/20/24  0839   WBC 6.5 6.3   HGB 10.3* 10.4*   HCT 30.9* 31.2*    102*    274       Recent Labs   Lab 10/24/24  0644   INR 1.21*         Attestation:   I have reviewed today's relevant vital signs,  notes, medications, labs and imaging.

## 2024-10-24 NOTE — PROGRESS NOTES
Cycler set up per protocol and per treatment orders     Results from previous treatment:  Effluent color and clarity: Chikis and clear, no fibrin  Total UF: 467   Initial Drain: 206  Avg Dwell: 1:18  Lost Dwell: 0:22    Cycler Serial Number: [id number]  Total Treatment Volume: 53162 mL  Total treatment time:  9 hrs  Fill Volume: 2000 ml  Last Fill Volume:0 ml  Heater ba.5% 6000mL;  Lot #: [id number];  Expiration Date:   Side Bag #1: 2.5% 6000mL;  Lot #: [id number];  Expiration Date:     Number of cycles including final fill: 5  Dwell Time: 1:22 minutes  Last Fill Volume: 0ml  Initial Drain Alarm: 0ml  PD orders reviewed with Patient procedure and ESRD teaching done and questions answered.  Cycler ready for hook-up.    Report given to: 6th floor charge nurse  Fredy consent form signed yes Cycler set up per protocol and per treatment orders

## 2024-10-24 NOTE — PLAN OF CARE
Summary: Presented to [facility] ED with worsening pain and redness to L great toe and streaking up leg ongoing for 2 days.    DATE & TIME: 10/24/24 6910-2354   Cognitive Concerns/ Orientation : A&O x 4   BEHAVIOR & AGGRESSION TOOL COLOR: Green  CIWA SCORE: NA   ABNL VS/O2: VSS on RA  MOBILITY: Up SBA with gait belt and cane  PAIN MANAGMENT: Headache improved, declined intervention  DIET: 2 gram Sodium-good appetite. NPO at midnight  BOWEL/BLADDER: Continent of bowel/bladder. Last BM today, constipation resolved now soft/loose  ABNL LAB/BG: Hep 0.43 recheck at 2045. Creat 8.16- on PD and would like to be connected early tonight around 1945.   DRAIN/DEVICES: Right upper arm  PIV infusing Heparin at 1400 units/hr. Right wrist PIV SL. PD catheter to LLQ of abdomen- dressing to be changed this evening  TELEMETRY RHYTHM: NA  SKIN: Dusky. Scattered bruises. Left great toe black with dry scab, gangrenous, luis streaking extending up foot to shin improved. Heels/feet are dry and flaky. Dressing to PD site was changed. Site WDL  TESTS/PROCEDURES: PD tonight and plans for vascular bypass surgery tomorrow 10/25 at 0840. PD should be hooked up early (around 1930) so it is completed by the time pre-op calls in morning  D/C DATE: TBD- may need further surgery after vascular intervention. Podiatry following.   OTHER IMPORTANT INFO: Nephrology following. Per Vascular heparin gtt can continue, stop on call to OR

## 2024-10-24 NOTE — ANESTHESIA PREPROCEDURE EVALUATION
Anesthesia Pre-Procedure Evaluation    Patient: Arnulfo Pritchett   MRN: [id number] : 1960        Procedure : Procedure(s):  CREATION, BYPASS, VASCULAR, FEMORAL TO TIBIAL          Past Medical History:   Diagnosis Date    CAD (coronary artery disease)     Chronic systolic heart failure (H)     ESRD (end stage renal disease) on dialysis (H)     Gastroesophageal reflux disease without esophagitis     Gout     HLD (hyperlipidemia)     TALI (obstructive sleep apnea)     PAD (peripheral artery disease) (H)     S/p RLE stenting of SFA/popliteal arteries    Type 2 diabetes mellitus with diabetic neuropathy (H)       Past Surgical History:   Procedure Laterality Date    IR LOWER EXTREMITY ANGIOGRAM LEFT  10/17/2024      No Known Allergies   Social History     Tobacco Use    Smoking status: Former     Types: Cigarettes    Smokeless tobacco: Never   Substance Use Topics    Alcohol use: Not Currently     Comment: quit 20 years      Wt Readings from Last 1 Encounters:   10/24/24 79.6 kg (175 lb 6.4 oz)        Anesthesia Evaluation            ROS/MED HX  ENT/Pulmonary:     (+) sleep apnea,               tobacco use,                        Neurologic:     (+)              TIA, date: ,                 Cardiovascular:     (+) Dyslipidemia hypertension- Peripheral Vascular Disease-  CAD -  - stent-.      CHF                           Previous cardiac testing   Echo: Date: Results:    Stress Test:  Date: 10/22/24 Results:     The nuclear stress test is abnormal. No evidence of ischemia. presence of visecral tracer artifact decreases specificity.     There is a medium sized area of infarction in the entire inferolateral segment(s) of the left ventricle extending into basal inferior segment.     There is a small area of nontransmural infarction in the apical segment(s) of the left ventricle.     TID is absent.     Left ventricular function is severely reduced.     The left ventricular ejection fraction at stress is 26%.      "There is no prior study for comparison.    ECG Reviewed:  Date: Results:    Cath:  Date: Results:      METS/Exercise Tolerance:     Hematologic:       Musculoskeletal:       GI/Hepatic:     (+) GERD,                   Renal/Genitourinary: Comment: S/p Right Nephrectomy for RCC    (+) renal disease, type: ESRD, Pt requires dialysis, type: Peritoneal dialysis,          Endo:     (+)  type II DM,       Diabetic complications: nephropathy neuropathy.             Psychiatric/Substance Use:       Infectious Disease:       Malignancy:       Other:            Physical Exam    Airway        Mallampati: II   TM distance: > 3 FB   Neck ROM: full   Mouth opening: > 3 cm    Respiratory Devices and Support         Dental       (+) Edentulous      Cardiovascular   cardiovascular exam normal          Pulmonary   pulmonary exam normal                OUTSIDE LABS:  CBC:   Lab Results   Component Value Date    WBC 6.5 10/23/2024    WBC 6.3 10/20/2024    HGB 10.3 (L) 10/23/2024    HGB 10.4 (L) 10/20/2024    HCT 30.9 (L) 10/23/2024    HCT 31.2 (L) 10/20/2024     10/23/2024     10/20/2024     BMP:   Lab Results   Component Value Date     10/24/2024     10/23/2024    POTASSIUM 4.0 10/24/2024    POTASSIUM 3.8 10/23/2024    CHLORIDE 98 10/24/2024    CHLORIDE 97 (L) 10/23/2024    CO2 28 10/24/2024    CO2 27 10/23/2024    BUN 40.5 (H) 10/24/2024    BUN 41.7 (H) 10/23/2024    CR 8.16 (H) 10/24/2024    CR 8.13 (H) 10/23/2024     (H) 10/24/2024     (H) 10/24/2024     COAGS:   Lab Results   Component Value Date    INR 1.21 (H) 10/24/2024     POC: No results found for: \"BGM\", \"HCG\", \"HCGS\"  HEPATIC:   Lab Results   Component Value Date    ALBUMIN 2.1 (L) 10/24/2024     OTHER:   Lab Results   Component Value Date    A1C 5.7 (H) 10/16/2024    TERENCE 8.1 (L) 10/24/2024    PHOS 4.8 (H) 10/24/2024    SED 86 (H) 10/15/2024       Anesthesia Plan    ASA Status:  3    NPO Status:  NPO Appropriate    Anesthesia Type: " General.     - Airway: ETT   Induction: Intravenous, Propofol.   Maintenance: Inhalation.   Techniques and Equipment:     - Lines/Monitors: 2nd IV, Arterial Line     - Drips/Meds: Phenylephrine, Vasopressin     Consents    Anesthesia Plan(s) and associated risks, benefits, and realistic alternatives discussed. Questions answered and patient/representative(s) expressed understanding.     - Discussed:     - Discussed with:  Patient            Postoperative Care    Pain management: IV analgesics, Oral pain medications.   PONV prophylaxis: Ondansetron (or other 5HT-3), Dexamethasone or Solumedrol     Comments:    Other Comments: Phenyl gtt, vaso boluses  Limit IVF           Gordon Cheng MD    I have reviewed the pertinent notes and labs in the chart from the past 30 days and (re)examined the patient.  Any updates or changes from those notes are reflected in this note.      # Hypochloremia: Lowest Cl = 97 mmol/L in last 2 days, will monitor as appropriate      # Hypoalbuminemia: Lowest albumin = 2 g/dL at 10/22/2024  6:48 AM, will monitor as appropriate  # Coagulation Defect: INR = 1.21 (Ref range: 0.85 - 1.15) and/or PTT = N/A, will monitor for bleeding    # Hypertension: Noted on problem list               # Financial/Environmental Concerns: none

## 2024-10-24 NOTE — PROGRESS NOTES
[facility]    Medicine Progress Note - Hospitalist Service    Date of Admission:  10/15/2024  Interval history:   Patient doing well today. Wondered why he could not take dilaudid and wants to stop oxycodone. He was worried about constipation with oxycodone. Decision to change over to dilaudid. Additional bowel softeners started. Plans for surgery on Friday.  He mentioned his awareness of the renal lesion.  He has not discussed it with his family yet.  IV antibiotics changed over to orals.  He had questions regarding this.  Cardiology has seen him and he is okay to proceed with his procedure  Peritoneal dialysis per nephrology    Assessment & Plan   Mr. Arnulfo Pritchett is a 62 yo male with Afib on warfarin, GERD, gout, insomnia, ESRD on PD, HFrEF, HTN, IDDM, diabetic peripheral neuropathy, CAD s/p stent placement x 3 (last 2021), TALI, HLD, and RLE PAD s/p balloon angio and stenting of SFA/popliteal arteries presented to [facility] ED with worsening pain and redness in left toe with streaking up the leg  for the last 2 days with throbbing pain.      Known history of peripheral vascular disease.  1) Admitted through [facility] 5/6- 5/11/2024 with right lower extremity arterial occlusion.  Prescribed Eliquis but having difficulties affording it over the 6 months prior to arrival.  Underwent right leg angiogram and recannulization of the SFA popliteal artery with balloon angioplasty and stenting (warfarin with heparin bridge and continued on Plavix with stop of aspirin)   2) Admit admit 7/9 to 7/11/2024 [facility] for left great toe blackening 7/9/2024 (had been treated with Augmentin.  X-ray showing no osteomyelitis or fracture): Arterial ultrasound with patent flow but x-ray 7/9 showing first phalangeal irregularity consistent with osteomyelitis.  Initially on IV Vanco and Zosyn.  Seen by podiatry debrided left great toe with no overt evidence of left foot osteomyelitis.   Thought to be a left great toe infection.  Received IV Vanco and Zosyn for 3 days and discharged on Augmentin for 2 weeks    Seen by podiatry 10/7 with left toe ulcer: Entire left hallux nail plate poorly attached with a subungual hematoma:   Current admission on 10/15/2024 >> increasing pain throbbing redness red streaking on his left foot extending from the left toe.     ## Worsening L great toe wound    ## Hx of acute RLE arterial occlusion s/p SFA popliteal balloon angioplasty and stenting, 5/6/2024 (on warfarin and plavix)   Presented to OSH with 2 day history of erythema and streaking redness going up his leg with associated increase pain and throbbing noted. No fever but both CRP 55 and ESR 86 are elevated.   - XR of left foot show:Soft tissue swelling great toe. There is subtle cortical  irregularity and probable erosive change along the distal phalanx/tuft  of the great toe which does raise concern for osteomyelitis.     Obtained MRI foot ->  Distal phalanx of the first digit appears pointed distally with replacement of normal fatty marrow and a soft tissue defect at the distal tuft. About 14 mm of the distal phalanx appear normal with normal fat signal and without significant edema. Constellation of findings favors gangrene.  Podiatry consultation -> will need at least a partial left hallux amputation, once/if vascular status is optimized.   Vascular surgery consultation  IR consulted -> 10/17 CTA abdomen/pelvis runoff for further evaluation of disease and to guide procedure planning.    - Right leg with no inflow or femoral-popliteal segment obstructions.  Occluded anterior tibial artery origin with proximal reconstruction without distal stenosis.Normal caliber peroneal artery. Tandem severe stenosis of the distal posterior tibial artery.   - Left leg: No inflow obstruction. Focal calcified severe stenosis of the proximal superficial femoral artery. Long segment occlusion of the mid to distal popliteal  artery with low attenuation changes which may represent thrombus or soft  atherosclerotic plaque. Reconstitution of the origins of the posterior tibial arteries. Severe tandem stenoses of the distal posterior tibial artery.  Holding warfarin and plavix in anticipation for possible surgical intervention and allowed INR to drift down.   10/20 INR to 1.7 (started on heparin drip)   10/20 as per vascular note planning for left femoral to below the knee popliteal in situ bypass graft with embolectomy.   Patient is not a candidate for attempted endovascular thrombectomy since high likelihood of embolization per prior discussions   10/22 ID consult: Discontinue Zosyn. (Suspected discoloration from ischemia and not cellulitis) changed to oral Augmentin until bypass surgery.  10/22 discussion regarding pain control.  Okay for patient to have opioids in the setting of his peripheral artery disease and upcoming surgery    Pain control  -Patient wanted low-dose pain meds for pain control  -Initially 10/22 discussion about oxycodone or Dilaudid.  He received oxy but wants to do Dilaudid  -Changed to oral solution of Dilaudid as cannot cut the pill in for starting at 0.5 every 6 as needed  -Increase bowel regimen as he feels he might get constipated     ## Chronic paroxysmal afib s/p atrial ablation, 6/2023  ## HFrEF 2/2 ICM  ## HLD  ## HTN  ## Hx of CAD s/p ALESIA placement (last 2021)  PTA now warfarin and Plavix instead of apixaban as above, as well as Entresto, Coreg, torsemide and statin.  Most recent EF last month on 6/4 with severely decrease LV function with est EF 20-25%.    Noted blood pressure soft 90/70s -> BPs now normalized  Decrease Coreg from 12.5 mg BID to 6.25 mg BID  Holding Torsemide  resume Entresto  Resume statin  Cardiology consult for presurgical risk assessment with bypass planned   10/22 Isabell scan abnormal.  No evidence of ischemia.  Medium sized area of infarction in the entire inferolateral left ventricle  extending into the inferolateral segment of left ventricle and basal segment.  Small area of nontransmural infarction apex.  EF 26  10/23 cardiology follow-up no ischemia.  Old infarcts.  No high risk findings.  High risk for volume overload but okay to proceed        ## ESRD on PD 2/2 DM nephropathy  ## Secondary hyperparathyroidism  ## Hyperkalemia  PTA on nightly PD of which he has been tolerating. Access RLQ double cuffed coiled PD catheter placed 4/16/2021.   - Nephrology consulted for PD planning  - Continue RRT medications below  - Continue calcitriol, cinacalcet and sevelamer carbonate  - Continue every other day gentamicin cream to PD cath side  - Telemetry for Hyperkalemia 5.7 -> now wnl -> tele stopped  - [facility] for hyperkalemia   - management per nephrology     ## Hx of RCC s/p R nephrectomy, 5/2022  -  CTA with 4 mm hyperenhancing nodule in lower pole of the left kidney   - follow up evaluation   -10/22 discussion with patient today who is aware of this finding and will follow-up with his urologist after discharge     ## DM type II  ## Diabetic neuropathy  Mod cho diet  Medium intensity sliding scale insulin  Last A1c 5.9 7/9/24  -10/22 reviewed blood sugars which are currently    Constipation:   -Struggles with constipation on MiraLAX daily  -Added Dulcolax suppository scheduled  -Senna twice daily     # Other chronic, stable medical conditions:  # Hx of gout: Continue PTA allopurinol  # TALI: Intolerant to nasal CPAP   # GERD: Continue daily PPI  # Insomnia: Continue PTA melatonin.         Diet: 2 Gram Sodium Diet    DVT Prophylaxis: heparin drip   Rosario Catheter: Not present  Lines: None     Cardiac Monitoring: None  Code Status: Full Code      Clinically Significant Risk Factors          # Hypochloremia: Lowest Cl = 97 mmol/L in last 2 days, will monitor as appropriate      # Hypoalbuminemia: Lowest albumin = 2 g/dL at 10/22/2024  6:48 AM, will monitor as appropriate    # Coagulation Defect: INR =  1.21 (Ref range: 0.85 - 1.15) and/or PTT = N/A, will monitor for bleeding    # Hypertension: Noted on problem list               # Financial/Environmental Concerns: none         Disposition Plan     Medically Ready for Discharge: Anticipated in 2-4 Days       PAMELLA HIGGINBOTHAM MD  Hospitalist Service  [facility]  Securely message with [employer] (more info)  Text page via [email or web address] Paging/Directory   ______________________________________________________________________      Physical Exam   Vital Signs: Temp: 98.6  F (37  C) Temp src: Oral BP: (!) 143/100 Pulse: 80   Resp: 18 SpO2: 93 % O2 Device: None (Room air)    Weight: 181 lbs 0 oz    General Appearance: Patient is awake and alert.  Respiratory: Clear to auscultation bilaterally without wheezes or rales  Cardiovascular: Regular rate and rhythm without gallop rub normal S1  and S2   GI: Positive bowel sounds soft no rebound guarding  Skin: Left toe is black in appearance on the tip.  There is erythematous purpleish change on the more proximal part of his foot      Medical Decision Making       45 MINUTES SPENT BY ME on the date of service doing chart review, history, exam, documentation & further activities per the note.    Coordination of care with preoperative clearance.  ID consult.  Discussion of imaging including findings on the kidney.    Data     I have personally reviewed the following data over the past 24 hrs:    6.5  \   10.3 (L)   / 264     135 97 (L) 41.7 (H) /  119 (H)   3.8 27 8.13 (H) \     ALT: N/A AST: N/A AP: N/A TBILI: N/A   ALB: 2.1 (L) TOT PROTEIN: N/A LIPASE: N/A     INR:  1.21 (H) PTT:  N/A   D-dimer:  N/A Fibrinogen:  N/A       Imaging results reviewed over the past 24 hrs:   No results found for this or any previous visit (from the past 24 hours).

## 2024-10-24 NOTE — PROGRESS NOTES
Vascular Surgery Note    Arnulfo Pritchett is a 64 year old male with CLTI of the left foot with streaking from foot to shin with left great toe wound. Cleared by cardiology to proceed with surgery.     Plan for lower extremity bypass on 10/25/2024 in the am. NPO at midnight starting today. Heparin can continue, will be stopped on call to OR.     Cecilia Stearns, CNP

## 2024-10-25 ENCOUNTER — ANESTHESIA (OUTPATIENT)
Dept: SURGERY | Facility: CLINIC | Age: 64
End: 2024-10-25
Payer: MEDICARE

## 2024-10-25 ENCOUNTER — APPOINTMENT (OUTPATIENT)
Dept: SURGERY | Facility: PHYSICIAN GROUP | Age: 64
End: 2024-10-25
Payer: MEDICARE

## 2024-10-25 LAB
ABO/RH(D): NORMAL
ALBUMIN SERPL BCG-MCNC: 2 G/DL (ref 3.5–5.2)
ANION GAP SERPL CALCULATED.3IONS-SCNC: 10 MMOL/L (ref 7–15)
ANTIBODY SCREEN: NEGATIVE
BUN SERPL-MCNC: 40 MG/DL (ref 8–23)
CALCIUM SERPL-MCNC: 8.6 MG/DL (ref 8.8–10.4)
CHLORIDE SERPL-SCNC: 99 MMOL/L (ref 98–107)
CREAT SERPL-MCNC: 8.12 MG/DL (ref 0.67–1.17)
EGFRCR SERPLBLD CKD-EPI 2021: 7 ML/MIN/1.73M2
GLUCOSE BLDC GLUCOMTR-MCNC: 123 MG/DL (ref 70–99)
GLUCOSE BLDC GLUCOMTR-MCNC: 157 MG/DL (ref 70–99)
GLUCOSE BLDC GLUCOMTR-MCNC: 175 MG/DL (ref 70–99)
GLUCOSE BLDC GLUCOMTR-MCNC: 215 MG/DL (ref 70–99)
GLUCOSE BLDC GLUCOMTR-MCNC: 232 MG/DL (ref 70–99)
GLUCOSE SERPL-MCNC: 163 MG/DL (ref 70–99)
HCO3 SERPL-SCNC: 27 MMOL/L (ref 22–29)
HGB BLD-MCNC: 8.9 G/DL (ref 13.3–17.7)
INR PPP: 1.15 (ref 0.85–1.15)
PHOSPHATE SERPL-MCNC: 4.9 MG/DL (ref 2.5–4.5)
PLATELET # BLD AUTO: 250 10E3/UL (ref 150–450)
POTASSIUM SERPL-SCNC: 3.9 MMOL/L (ref 3.4–5.3)
SODIUM SERPL-SCNC: 136 MMOL/L (ref 135–145)
SPECIMEN EXPIRATION DATE: NORMAL
UFH PPP CHRO-ACNC: 0.36 IU/ML

## 2024-10-25 PROCEDURE — 250N000009 HC RX 250: Performed by: NURSE ANESTHETIST, CERTIFIED REGISTERED

## 2024-10-25 PROCEDURE — 041L09L BYPASS LEFT FEMORAL ARTERY TO POPLITEAL ARTERY WITH AUTOLOGOUS VENOUS TISSUE, OPEN APPROACH: ICD-10-PCS | Performed by: SURGERY

## 2024-10-25 PROCEDURE — 250N000025 HC SEVOFLURANE, PER MIN: Performed by: SURGERY

## 2024-10-25 PROCEDURE — 360N000077 HC SURGERY LEVEL 4, PER MIN: Performed by: SURGERY

## 2024-10-25 PROCEDURE — 710N000009 HC RECOVERY PHASE 1, LEVEL 1, PER MIN: Performed by: SURGERY

## 2024-10-25 PROCEDURE — 250N000013 HC RX MED GY IP 250 OP 250 PS 637

## 2024-10-25 PROCEDURE — 250N000011 HC RX IP 250 OP 636: Performed by: STUDENT IN AN ORGANIZED HEALTH CARE EDUCATION/TRAINING PROGRAM

## 2024-10-25 PROCEDURE — P9045 ALBUMIN (HUMAN), 5%, 250 ML: HCPCS | Performed by: ANESTHESIOLOGY

## 2024-10-25 PROCEDURE — 258N000003 HC RX IP 258 OP 636: Performed by: SURGERY

## 2024-10-25 PROCEDURE — 85049 AUTOMATED PLATELET COUNT: CPT

## 2024-10-25 PROCEDURE — 250N000009 HC RX 250: Performed by: ANESTHESIOLOGY

## 2024-10-25 PROCEDURE — C1760 CLOSURE DEV, VASC: HCPCS | Performed by: SURGERY

## 2024-10-25 PROCEDURE — 370N000017 HC ANESTHESIA TECHNICAL FEE, PER MIN: Performed by: SURGERY

## 2024-10-25 PROCEDURE — P9045 ALBUMIN (HUMAN), 5%, 250 ML: HCPCS | Performed by: NURSE ANESTHETIST, CERTIFIED REGISTERED

## 2024-10-25 PROCEDURE — 250N000009 HC RX 250: Performed by: SURGERY

## 2024-10-25 PROCEDURE — 272N000001 HC OR GENERAL SUPPLY STERILE: Performed by: SURGERY

## 2024-10-25 PROCEDURE — 97605 NEG PRS WND THER DME<=50SQCM: CPT | Performed by: SURGERY

## 2024-10-25 PROCEDURE — 82310 ASSAY OF CALCIUM: CPT | Performed by: INTERNAL MEDICINE

## 2024-10-25 PROCEDURE — C1763 CONN TISS, NON-HUMAN: HCPCS | Performed by: SURGERY

## 2024-10-25 PROCEDURE — 258N000003 HC RX IP 258 OP 636: Performed by: ANESTHESIOLOGY

## 2024-10-25 PROCEDURE — 35566 ART BYP FEM-ANT-POST TIB/PRL: CPT | Performed by: NURSE ANESTHETIST, CERTIFIED REGISTERED

## 2024-10-25 PROCEDURE — 250N000013 HC RX MED GY IP 250 OP 250 PS 637: Performed by: INTERNAL MEDICINE

## 2024-10-25 PROCEDURE — 120N000001 HC R&B MED SURG/OB

## 2024-10-25 PROCEDURE — 04UL0KZ SUPPLEMENT LEFT FEMORAL ARTERY WITH NONAUTOLOGOUS TISSUE SUBSTITUTE, OPEN APPROACH: ICD-10-PCS | Performed by: SURGERY

## 2024-10-25 PROCEDURE — 250N000011 HC RX IP 250 OP 636: Performed by: ANESTHESIOLOGY

## 2024-10-25 PROCEDURE — 36415 COLL VENOUS BLD VENIPUNCTURE: CPT | Performed by: SURGERY

## 2024-10-25 PROCEDURE — 85610 PROTHROMBIN TIME: CPT | Performed by: INTERNAL MEDICINE

## 2024-10-25 PROCEDURE — 047L0ZZ DILATION OF LEFT FEMORAL ARTERY, OPEN APPROACH: ICD-10-PCS | Performed by: SURGERY

## 2024-10-25 PROCEDURE — 999N000141 HC STATISTIC PRE-PROCEDURE NURSING ASSESSMENT: Performed by: SURGERY

## 2024-10-25 PROCEDURE — 250N000012 HC RX MED GY IP 250 OP 636 PS 637: Performed by: ANESTHESIOLOGY

## 2024-10-25 PROCEDURE — 06BQ0ZZ EXCISION OF LEFT SAPHENOUS VEIN, OPEN APPROACH: ICD-10-PCS | Performed by: SURGERY

## 2024-10-25 PROCEDURE — 36415 COLL VENOUS BLD VENIPUNCTURE: CPT

## 2024-10-25 PROCEDURE — 85520 HEPARIN ASSAY: CPT | Performed by: INTERNAL MEDICINE

## 2024-10-25 PROCEDURE — 82040 ASSAY OF SERUM ALBUMIN: CPT | Performed by: INTERNAL MEDICINE

## 2024-10-25 PROCEDURE — 35566 ART BYP FEM-ANT-POST TIB/PRL: CPT | Performed by: ANESTHESIOLOGY

## 2024-10-25 PROCEDURE — 36415 COLL VENOUS BLD VENIPUNCTURE: CPT | Performed by: INTERNAL MEDICINE

## 2024-10-25 PROCEDURE — 04CL0ZZ EXTIRPATION OF MATTER FROM LEFT FEMORAL ARTERY, OPEN APPROACH: ICD-10-PCS | Performed by: SURGERY

## 2024-10-25 PROCEDURE — 86900 BLOOD TYPING SEROLOGIC ABO: CPT | Performed by: SURGERY

## 2024-10-25 PROCEDURE — 250N000013 HC RX MED GY IP 250 OP 250 PS 637: Performed by: STUDENT IN AN ORGANIZED HEALTH CARE EDUCATION/TRAINING PROGRAM

## 2024-10-25 PROCEDURE — 250N000011 HC RX IP 250 OP 636: Performed by: NURSE ANESTHETIST, CERTIFIED REGISTERED

## 2024-10-25 PROCEDURE — 250N000011 HC RX IP 250 OP 636: Performed by: SURGERY

## 2024-10-25 PROCEDURE — 85018 HEMOGLOBIN: CPT | Performed by: ANESTHESIOLOGY

## 2024-10-25 PROCEDURE — 35585 VEIN BYP FEM-TIBIAL PERONEAL: CPT | Mod: 22 | Performed by: SURGERY

## 2024-10-25 DEVICE — DECELLULARIZED BOVINE PERICARDIUM
Type: IMPLANTABLE DEVICE | Site: GROIN | Status: FUNCTIONAL
Brand: PHOTOFIX DECELLULARIZED BOVINE PERICARDIUM

## 2024-10-25 RX ORDER — HEPARIN SODIUM 5000 [USP'U]/.5ML
5000 INJECTION, SOLUTION INTRAVENOUS; SUBCUTANEOUS EVERY 8 HOURS
Status: DISCONTINUED | OUTPATIENT
Start: 2024-10-26 | End: 2024-10-26

## 2024-10-25 RX ORDER — HYDROMORPHONE HCL IN WATER/PF 6 MG/30 ML
0.2 PATIENT CONTROLLED ANALGESIA SYRINGE INTRAVENOUS
Status: DISCONTINUED | OUTPATIENT
Start: 2024-10-25 | End: 2024-11-09 | Stop reason: HOSPADM

## 2024-10-25 RX ORDER — DEXAMETHASONE SODIUM PHOSPHATE 4 MG/ML
4 INJECTION, SOLUTION INTRA-ARTICULAR; INTRALESIONAL; INTRAMUSCULAR; INTRAVENOUS; SOFT TISSUE
Status: DISCONTINUED | OUTPATIENT
Start: 2024-10-25 | End: 2024-10-25 | Stop reason: HOSPADM

## 2024-10-25 RX ORDER — FENTANYL CITRATE 0.05 MG/ML
50 INJECTION, SOLUTION INTRAMUSCULAR; INTRAVENOUS EVERY 5 MIN PRN
Status: DISCONTINUED | OUTPATIENT
Start: 2024-10-25 | End: 2024-10-25 | Stop reason: HOSPADM

## 2024-10-25 RX ORDER — HYDROMORPHONE HCL IN WATER/PF 6 MG/30 ML
0.2 PATIENT CONTROLLED ANALGESIA SYRINGE INTRAVENOUS EVERY 5 MIN PRN
Status: DISCONTINUED | OUTPATIENT
Start: 2024-10-25 | End: 2024-10-25 | Stop reason: HOSPADM

## 2024-10-25 RX ORDER — FENTANYL CITRATE 0.05 MG/ML
25 INJECTION, SOLUTION INTRAMUSCULAR; INTRAVENOUS EVERY 5 MIN PRN
Status: DISCONTINUED | OUTPATIENT
Start: 2024-10-25 | End: 2024-10-25 | Stop reason: HOSPADM

## 2024-10-25 RX ORDER — LIDOCAINE HYDROCHLORIDE 20 MG/ML
INJECTION, SOLUTION INFILTRATION; PERINEURAL
Status: COMPLETED | OUTPATIENT
Start: 2024-10-25 | End: 2024-10-25

## 2024-10-25 RX ORDER — CEFAZOLIN SODIUM/WATER 2 G/20 ML
2 SYRINGE (ML) INTRAVENOUS SEE ADMIN INSTRUCTIONS
Status: DISCONTINUED | OUTPATIENT
Start: 2024-10-25 | End: 2024-10-25 | Stop reason: HOSPADM

## 2024-10-25 RX ORDER — GENTAMICIN SULFATE 1 MG/G
CREAM TOPICAL DAILY PRN
Status: DISCONTINUED | OUTPATIENT
Start: 2024-10-25 | End: 2024-10-26

## 2024-10-25 RX ORDER — ASPIRIN 81 MG/1
81 TABLET ORAL DAILY
Status: DISCONTINUED | OUTPATIENT
Start: 2024-10-26 | End: 2024-10-25

## 2024-10-25 RX ORDER — HEPARIN SODIUM 1000 [USP'U]/ML
INJECTION, SOLUTION INTRAVENOUS; SUBCUTANEOUS PRN
Status: DISCONTINUED | OUTPATIENT
Start: 2024-10-25 | End: 2024-10-25

## 2024-10-25 RX ORDER — PROTAMINE SULFATE 10 MG/ML
INJECTION, SOLUTION INTRAVENOUS PRN
Status: DISCONTINUED | OUTPATIENT
Start: 2024-10-25 | End: 2024-10-25

## 2024-10-25 RX ORDER — MAGNESIUM HYDROXIDE 1200 MG/15ML
LIQUID ORAL PRN
Status: DISCONTINUED | OUTPATIENT
Start: 2024-10-25 | End: 2024-10-25 | Stop reason: HOSPADM

## 2024-10-25 RX ORDER — ONDANSETRON 4 MG/1
4 TABLET, ORALLY DISINTEGRATING ORAL EVERY 30 MIN PRN
Status: DISCONTINUED | OUTPATIENT
Start: 2024-10-25 | End: 2024-10-25 | Stop reason: HOSPADM

## 2024-10-25 RX ORDER — ONDANSETRON 2 MG/ML
4 INJECTION INTRAMUSCULAR; INTRAVENOUS EVERY 30 MIN PRN
Status: DISCONTINUED | OUTPATIENT
Start: 2024-10-25 | End: 2024-10-25 | Stop reason: HOSPADM

## 2024-10-25 RX ORDER — NALOXONE HYDROCHLORIDE 0.4 MG/ML
0.1 INJECTION, SOLUTION INTRAMUSCULAR; INTRAVENOUS; SUBCUTANEOUS
Status: DISCONTINUED | OUTPATIENT
Start: 2024-10-25 | End: 2024-10-25 | Stop reason: HOSPADM

## 2024-10-25 RX ORDER — POLYETHYLENE GLYCOL 3350 17 G/17G
17 POWDER, FOR SOLUTION ORAL DAILY
Status: DISCONTINUED | OUTPATIENT
Start: 2024-10-26 | End: 2024-10-25

## 2024-10-25 RX ORDER — HYDROMORPHONE HCL IN WATER/PF 6 MG/30 ML
0.1 PATIENT CONTROLLED ANALGESIA SYRINGE INTRAVENOUS
Status: DISCONTINUED | OUTPATIENT
Start: 2024-10-25 | End: 2024-11-09 | Stop reason: HOSPADM

## 2024-10-25 RX ORDER — ONDANSETRON 2 MG/ML
4 INJECTION INTRAMUSCULAR; INTRAVENOUS EVERY 6 HOURS PRN
Status: DISCONTINUED | OUTPATIENT
Start: 2024-10-25 | End: 2024-10-25

## 2024-10-25 RX ORDER — SODIUM CHLORIDE, SODIUM LACTATE, POTASSIUM CHLORIDE, CALCIUM CHLORIDE 600; 310; 30; 20 MG/100ML; MG/100ML; MG/100ML; MG/100ML
INJECTION, SOLUTION INTRAVENOUS CONTINUOUS
Status: DISCONTINUED | OUTPATIENT
Start: 2024-10-25 | End: 2024-10-25 | Stop reason: HOSPADM

## 2024-10-25 RX ORDER — PROCHLORPERAZINE MALEATE 5 MG/1
5 TABLET ORAL EVERY 6 HOURS PRN
Status: DISCONTINUED | OUTPATIENT
Start: 2024-10-25 | End: 2024-10-27

## 2024-10-25 RX ORDER — VASOPRESSIN IN 0.9 % NACL 2 UNIT/2ML
SYRINGE (ML) INTRAVENOUS PRN
Status: DISCONTINUED | OUTPATIENT
Start: 2024-10-25 | End: 2024-10-25

## 2024-10-25 RX ORDER — ACETAMINOPHEN 325 MG/1
650 TABLET ORAL EVERY 4 HOURS PRN
Status: DISCONTINUED | OUTPATIENT
Start: 2024-10-28 | End: 2024-10-27

## 2024-10-25 RX ORDER — HYDROMORPHONE HCL IN WATER/PF 6 MG/30 ML
0.4 PATIENT CONTROLLED ANALGESIA SYRINGE INTRAVENOUS EVERY 5 MIN PRN
Status: DISCONTINUED | OUTPATIENT
Start: 2024-10-25 | End: 2024-10-25 | Stop reason: HOSPADM

## 2024-10-25 RX ORDER — CEFAZOLIN SODIUM 1 G/3ML
1 INJECTION, POWDER, FOR SOLUTION INTRAMUSCULAR; INTRAVENOUS EVERY 8 HOURS
Status: DISCONTINUED | OUTPATIENT
Start: 2024-10-25 | End: 2024-10-25 | Stop reason: ALTCHOICE

## 2024-10-25 RX ORDER — ACETAMINOPHEN 325 MG/1
975 TABLET ORAL EVERY 8 HOURS
Status: DISCONTINUED | OUTPATIENT
Start: 2024-10-25 | End: 2024-10-27

## 2024-10-25 RX ORDER — ONDANSETRON 4 MG/1
4 TABLET, ORALLY DISINTEGRATING ORAL EVERY 6 HOURS PRN
Status: DISCONTINUED | OUTPATIENT
Start: 2024-10-25 | End: 2024-10-25

## 2024-10-25 RX ORDER — HEPARIN SODIUM 1000 [USP'U]/ML
INJECTION, SOLUTION INTRAVENOUS; SUBCUTANEOUS
Status: DISCONTINUED
Start: 2024-10-25 | End: 2024-10-25 | Stop reason: HOSPADM

## 2024-10-25 RX ORDER — SODIUM CHLORIDE 9 MG/ML
INJECTION, SOLUTION INTRAVENOUS CONTINUOUS PRN
Status: DISCONTINUED | OUTPATIENT
Start: 2024-10-25 | End: 2024-10-25

## 2024-10-25 RX ORDER — BISACODYL 10 MG
10 SUPPOSITORY, RECTAL RECTAL DAILY PRN
Status: DISCONTINUED | OUTPATIENT
Start: 2024-10-28 | End: 2024-10-27

## 2024-10-25 RX ORDER — FENTANYL CITRATE 50 UG/ML
INJECTION, SOLUTION INTRAMUSCULAR; INTRAVENOUS PRN
Status: DISCONTINUED | OUTPATIENT
Start: 2024-10-25 | End: 2024-10-25

## 2024-10-25 RX ORDER — LIDOCAINE HYDROCHLORIDE 20 MG/ML
INJECTION, SOLUTION INFILTRATION; PERINEURAL PRN
Status: DISCONTINUED | OUTPATIENT
Start: 2024-10-25 | End: 2024-10-25

## 2024-10-25 RX ORDER — CEFAZOLIN SODIUM/WATER 2 G/20 ML
2 SYRINGE (ML) INTRAVENOUS
Status: COMPLETED | OUTPATIENT
Start: 2024-10-25 | End: 2024-10-25

## 2024-10-25 RX ORDER — PROPOFOL 10 MG/ML
INJECTION, EMULSION INTRAVENOUS PRN
Status: DISCONTINUED | OUTPATIENT
Start: 2024-10-25 | End: 2024-10-25

## 2024-10-25 RX ORDER — AMOXICILLIN 250 MG
1 CAPSULE ORAL 2 TIMES DAILY
Status: DISCONTINUED | OUTPATIENT
Start: 2024-10-25 | End: 2024-10-27

## 2024-10-25 RX ORDER — DEXAMETHASONE SODIUM PHOSPHATE 4 MG/ML
INJECTION, SOLUTION INTRA-ARTICULAR; INTRALESIONAL; INTRAMUSCULAR; INTRAVENOUS; SOFT TISSUE PRN
Status: DISCONTINUED | OUTPATIENT
Start: 2024-10-25 | End: 2024-10-25

## 2024-10-25 RX ORDER — LIDOCAINE 40 MG/G
CREAM TOPICAL
Status: DISCONTINUED | OUTPATIENT
Start: 2024-10-25 | End: 2024-10-26

## 2024-10-25 RX ORDER — ONDANSETRON 2 MG/ML
INJECTION INTRAMUSCULAR; INTRAVENOUS PRN
Status: DISCONTINUED | OUTPATIENT
Start: 2024-10-25 | End: 2024-10-25

## 2024-10-25 RX ORDER — OXYCODONE HYDROCHLORIDE 5 MG/1
5 TABLET ORAL EVERY 4 HOURS PRN
Status: DISCONTINUED | OUTPATIENT
Start: 2024-10-25 | End: 2024-10-27

## 2024-10-25 RX ORDER — NOREPINEPHRINE BITARTRATE 0.02 MG/ML
INJECTION, SOLUTION INTRAVENOUS CONTINUOUS PRN
Status: DISCONTINUED | OUTPATIENT
Start: 2024-10-25 | End: 2024-10-25

## 2024-10-25 RX ADMIN — NOREPINEPHRINE BITARTRATE 6.4 MCG: 1 INJECTION, SOLUTION, CONCENTRATE INTRAVENOUS at 17:05

## 2024-10-25 RX ADMIN — FENTANYL CITRATE 25 MCG: 50 INJECTION, SOLUTION INTRAMUSCULAR; INTRAVENOUS at 19:16

## 2024-10-25 RX ADMIN — Medication 0.25 UNITS: at 16:05

## 2024-10-25 RX ADMIN — SODIUM CHLORIDE: 9 INJECTION, SOLUTION INTRAVENOUS at 12:00

## 2024-10-25 RX ADMIN — NOREPINEPHRINE BITARTRATE 2 MCG: 1 INJECTION, SOLUTION, CONCENTRATE INTRAVENOUS at 14:32

## 2024-10-25 RX ADMIN — PROTAMINE SULFATE 5 MG: 10 INJECTION, SOLUTION INTRAVENOUS at 16:16

## 2024-10-25 RX ADMIN — FENTANYL CITRATE 50 MCG: 50 INJECTION INTRAMUSCULAR; INTRAVENOUS at 13:48

## 2024-10-25 RX ADMIN — FENTANYL CITRATE 25 MCG: 50 INJECTION, SOLUTION INTRAMUSCULAR; INTRAVENOUS at 18:25

## 2024-10-25 RX ADMIN — HYDROMORPHONE HYDROCHLORIDE 0.2 MG: 0.2 INJECTION, SOLUTION INTRAMUSCULAR; INTRAVENOUS; SUBCUTANEOUS at 20:10

## 2024-10-25 RX ADMIN — ONDANSETRON 4 MG: 2 INJECTION INTRAMUSCULAR; INTRAVENOUS at 16:39

## 2024-10-25 RX ADMIN — NOREPINEPHRINE BITARTRATE 8 MCG: 1 INJECTION, SOLUTION, CONCENTRATE INTRAVENOUS at 12:13

## 2024-10-25 RX ADMIN — NOREPINEPHRINE BITARTRATE 2 MCG: 1 INJECTION, SOLUTION, CONCENTRATE INTRAVENOUS at 14:29

## 2024-10-25 RX ADMIN — HEPARIN SODIUM 7000 UNITS: 1000 INJECTION, SOLUTION INTRAVENOUS; SUBCUTANEOUS at 14:13

## 2024-10-25 RX ADMIN — HEPARIN SODIUM 4000 UNITS: 1000 INJECTION, SOLUTION INTRAVENOUS; SUBCUTANEOUS at 15:06

## 2024-10-25 RX ADMIN — Medication 0.25 UNITS: at 14:51

## 2024-10-25 RX ADMIN — PROPOFOL 30 MG: 10 INJECTION, EMULSION INTRAVENOUS at 17:00

## 2024-10-25 RX ADMIN — SODIUM CHLORIDE 2 UNITS: 9 INJECTION, SOLUTION INTRAVENOUS at 18:00

## 2024-10-25 RX ADMIN — HYDROMORPHONE HYDROCHLORIDE 0.2 MG: 0.2 INJECTION, SOLUTION INTRAMUSCULAR; INTRAVENOUS; SUBCUTANEOUS at 19:35

## 2024-10-25 RX ADMIN — FENTANYL CITRATE 100 MCG: 50 INJECTION INTRAMUSCULAR; INTRAVENOUS at 12:05

## 2024-10-25 RX ADMIN — SACUBITRIL AND VALSARTAN 1 TABLET: 49; 51 TABLET, FILM COATED ORAL at 22:03

## 2024-10-25 RX ADMIN — NOREPINEPHRINE BITARTRATE 2 MCG: 1 INJECTION, SOLUTION, CONCENTRATE INTRAVENOUS at 12:57

## 2024-10-25 RX ADMIN — FENTANYL CITRATE 25 MCG: 50 INJECTION INTRAMUSCULAR; INTRAVENOUS at 16:21

## 2024-10-25 RX ADMIN — PROTAMINE SULFATE 5 MG: 10 INJECTION, SOLUTION INTRAVENOUS at 16:18

## 2024-10-25 RX ADMIN — FENTANYL CITRATE 25 MCG: 50 INJECTION, SOLUTION INTRAMUSCULAR; INTRAVENOUS at 19:25

## 2024-10-25 RX ADMIN — Medication 0.25 UNITS: at 15:38

## 2024-10-25 RX ADMIN — NOREPINEPHRINE BITARTRATE 4 MCG: 1 INJECTION, SOLUTION, CONCENTRATE INTRAVENOUS at 12:45

## 2024-10-25 RX ADMIN — NOREPINEPHRINE BITARTRATE 2 MCG: 1 INJECTION, SOLUTION, CONCENTRATE INTRAVENOUS at 14:25

## 2024-10-25 RX ADMIN — HEPARIN SODIUM 1400 UNITS/HR: 10000 INJECTION, SOLUTION INTRAVENOUS at 00:51

## 2024-10-25 RX ADMIN — Medication 0.25 UNITS: at 14:34

## 2024-10-25 RX ADMIN — PROTAMINE SULFATE 10 MG: 10 INJECTION, SOLUTION INTRAVENOUS at 16:23

## 2024-10-25 RX ADMIN — LIDOCAINE HYDROCHLORIDE 100 MG: 20 INJECTION, SOLUTION INFILTRATION; PERINEURAL at 12:05

## 2024-10-25 RX ADMIN — NOREPINEPHRINE BITARTRATE 6.4 MCG: 1 INJECTION, SOLUTION, CONCENTRATE INTRAVENOUS at 16:29

## 2024-10-25 RX ADMIN — HYDROMORPHONE HYDROCHLORIDE 0.25 MG: 1 INJECTION, SOLUTION INTRAMUSCULAR; INTRAVENOUS; SUBCUTANEOUS at 16:25

## 2024-10-25 RX ADMIN — ROCURONIUM BROMIDE 50 MG: 50 INJECTION, SOLUTION INTRAVENOUS at 12:05

## 2024-10-25 RX ADMIN — NOREPINEPHRINE BITARTRATE 0.3 MCG/KG/MIN: 0.02 INJECTION, SOLUTION INTRAVENOUS at 12:09

## 2024-10-25 RX ADMIN — NOREPINEPHRINE BITARTRATE 2 MCG: 1 INJECTION, SOLUTION, CONCENTRATE INTRAVENOUS at 16:11

## 2024-10-25 RX ADMIN — NOREPINEPHRINE BITARTRATE 6.4 MCG: 1 INJECTION, SOLUTION, CONCENTRATE INTRAVENOUS at 16:30

## 2024-10-25 RX ADMIN — FENTANYL CITRATE 25 MCG: 50 INJECTION INTRAMUSCULAR; INTRAVENOUS at 16:23

## 2024-10-25 RX ADMIN — PANTOPRAZOLE SODIUM 40 MG: 40 TABLET, DELAYED RELEASE ORAL at 22:03

## 2024-10-25 RX ADMIN — PROPOFOL 130 MG: 10 INJECTION, EMULSION INTRAVENOUS at 12:05

## 2024-10-25 RX ADMIN — NOREPINEPHRINE BITARTRATE 6.4 MCG: 1 INJECTION, SOLUTION, CONCENTRATE INTRAVENOUS at 16:51

## 2024-10-25 RX ADMIN — LIDOCAINE HYDROCHLORIDE 2 ML: 20 INJECTION, SOLUTION INFILTRATION; PERINEURAL at 08:30

## 2024-10-25 RX ADMIN — FENTANYL CITRATE 25 MCG: 50 INJECTION, SOLUTION INTRAMUSCULAR; INTRAVENOUS at 18:01

## 2024-10-25 RX ADMIN — CARVEDILOL 6.25 MG: 6.25 TABLET, FILM COATED ORAL at 22:02

## 2024-10-25 RX ADMIN — ALBUMIN (HUMAN): 12.5 SOLUTION INTRAVENOUS at 15:43

## 2024-10-25 RX ADMIN — Medication 0.25 UNITS: at 15:49

## 2024-10-25 RX ADMIN — Medication 200 MG: at 16:45

## 2024-10-25 RX ADMIN — ALBUMIN HUMAN 12.5 G: 0.05 INJECTION, SOLUTION INTRAVENOUS at 18:23

## 2024-10-25 RX ADMIN — ATORVASTATIN CALCIUM 40 MG: 40 TABLET, FILM COATED ORAL at 22:02

## 2024-10-25 RX ADMIN — Medication 0.25 UNITS: at 15:06

## 2024-10-25 RX ADMIN — ACETAMINOPHEN 975 MG: 325 TABLET, FILM COATED ORAL at 22:02

## 2024-10-25 RX ADMIN — DEXAMETHASONE SODIUM PHOSPHATE 4 MG: 4 INJECTION, SOLUTION INTRA-ARTICULAR; INTRALESIONAL; INTRAMUSCULAR; INTRAVENOUS; SOFT TISSUE at 16:41

## 2024-10-25 RX ADMIN — FENTANYL CITRATE 25 MCG: 50 INJECTION, SOLUTION INTRAMUSCULAR; INTRAVENOUS at 18:11

## 2024-10-25 RX ADMIN — Medication 0.25 UNITS: at 15:28

## 2024-10-25 RX ADMIN — NOREPINEPHRINE BITARTRATE 2 MCG: 1 INJECTION, SOLUTION, CONCENTRATE INTRAVENOUS at 16:28

## 2024-10-25 RX ADMIN — HYDROMORPHONE HYDROCHLORIDE 0.5 MG: 1 INJECTION, SOLUTION INTRAMUSCULAR; INTRAVENOUS; SUBCUTANEOUS at 12:39

## 2024-10-25 RX ADMIN — Medication 2 G: at 12:03

## 2024-10-25 RX ADMIN — ALBUMIN (HUMAN): 12.5 SOLUTION INTRAVENOUS at 16:23

## 2024-10-25 RX ADMIN — SENNOSIDES AND DOCUSATE SODIUM 1 TABLET: 50; 8.6 TABLET ORAL at 22:03

## 2024-10-25 RX ADMIN — CALCITRIOL CAPSULES 0.25 MCG 0.25 MCG: 0.25 CAPSULE ORAL at 22:03

## 2024-10-25 RX ADMIN — Medication 2 G: at 15:53

## 2024-10-25 RX ADMIN — NOREPINEPHRINE BITARTRATE 6.4 MCG: 1 INJECTION, SOLUTION, CONCENTRATE INTRAVENOUS at 16:45

## 2024-10-25 RX ADMIN — Medication 0.25 UNITS: at 16:32

## 2024-10-25 RX ADMIN — NOREPINEPHRINE BITARTRATE 8 MCG: 1 INJECTION, SOLUTION, CONCENTRATE INTRAVENOUS at 12:09

## 2024-10-25 RX ADMIN — FENTANYL CITRATE 25 MCG: 50 INJECTION, SOLUTION INTRAMUSCULAR; INTRAVENOUS at 18:17

## 2024-10-25 RX ADMIN — PROTAMINE SULFATE 10 MG: 10 INJECTION, SOLUTION INTRAVENOUS at 16:20

## 2024-10-25 RX ADMIN — ALBUMIN (HUMAN): 12.5 SOLUTION INTRAVENOUS at 16:57

## 2024-10-25 RX ADMIN — NOREPINEPHRINE BITARTRATE 6.4 MCG: 1 INJECTION, SOLUTION, CONCENTRATE INTRAVENOUS at 16:35

## 2024-10-25 NOTE — ANESTHESIA PROCEDURE NOTES
Airway       Patient location during procedure: OR  Staff -        Anesthesiologist:  Gordon Cheng MD       CRNA: Cyndi Flores       Performed By: CRNA  Consent for Airway        Urgency: elective  Indications and Patient Condition       Indications for airway management: alexsandra-procedural       Induction type:intravenous       Mask difficulty assessment: 1 - vent by mask    Final Airway Details       Final airway type: endotracheal airway       Successful airway: ETT - single and Oral  Endotracheal Airway Details        ETT size (mm): 8.0       Cuffed: yes       Successful intubation technique: direct laryngoscopy       DL Blade Type: MAC 4       Grade View of Cords: 1       Adjucts: stylet       Position: Right       Measured from: gums/teeth       Secured at (cm): 21       Bite block used: None    Post intubation assessment        Placement verified by: capnometry, equal breath sounds and chest rise        Number of attempts at approach: 1       Number of other approaches attempted: 0       Secured with: tape       Ease of procedure: easy       Dentition: Intact and Unchanged

## 2024-10-25 NOTE — PROVIDER NOTIFICATION
Dr. Morales was unable to see patient as he remained in the operating room from 7 AM until late in the evening and the PACU .  Please call the hospitalist team with updates or need for input  Dr. Susana morales

## 2024-10-25 NOTE — OP NOTE
Pre operative diagnosis: Left lower extremity chronic limb threatening ischemia.     Postoperative diagnosis: Same.    Procedure:  1.  Left femoral endarterectomy with bovine pericardial patch angioplasty.  2.  Left distal common femoral artery/proximal superficial femoral artery to tibioperoneal trunk bypass using ipsilateral partially translocated nonreversed in situ great saphenous vein.  3. Temporary closure of left groin wound with wound vacc device.     Surgeon: Raul Gray M.D.   Assistant attending staff Surgeon: Angélica Vaca M.D., this is a complicated case on a complicated patient.  Patient has severely calcified vessels and as expected this was a difficult case.  There was no qualified resident, trainee or fellow available to assist with critical portions of the procedure.  I had specifically asked Dr. Vaca to assist me in this complicated surgical procedure to reduce operative time with having to attending staff surgeon available for the case.  Assistants: Bertha Wu CST.   Assistant: Evelia Appiah PA-C.  It should be noted that 's [facility] was necessary in retraction and help with closure of surgical wound.    Anesthesia: General.  Specimens: None.  Estimated blood loss: About 750 mL.  Heparin: 11,000 units.  Protamine: 30 mg.  Complication: None.  Drains: Wound VAC sponge was applied to the left groin.    Indication for procedure: This is a 64-year-old male with chronic limb threatening ischemia of the left lower extremity with wounds and multilevel peripheral arterial disease.  On his CT angiogram he had short segment occlusion of the superficial femoral artery.  Popliteal artery and origins of all tibial vessels are also occluded.  We will plan to proceed with bypass for limb salvage.    Procedure details: Patient was identified and then taken to the operating room and placed in supine position.  General anesthesia induced.  Preoperative intravenous antibiotics administered.  The  left groin, lower abdomen, left lower extremity down to the toes was prepped and a sterile surgical field was created.    Preprocedure timeout was conducted.  We had marked out the left great saphenous vein from the left saphenofemoral junction to the mid leg.  We started with a left groin incision.  A left groin incision was made and deepened with electrocautery.  The distal left external iliac artery, common femoral artery, deep femoral artery and superficial femoral arteries were dissected out and placed in Vesseloops.  These arteries were significantly calcified and we had to dissect quite cephalad and into 6 cm of the superficial femoral artery to find spots where we could safely clamp the vessels.  Through the same incision the left great saphenous vein was dissected out up to its confluence with the left common femoral vein.  All the side branches were ligated with silk suture and divided.    Attention was turned to the below-knee target.  A infragenicular medial below-knee incision was made and deepened with electrocautery.  Through the same incision we also dissected out the great saphenous vein.  We entered the deep posterior compartment and took down the soleus muscle from the gastroc soleal arcade.  The popliteal artery did not have any pulsation and was calcified.  The anterior tibial artery was dissected out and then we continued our dissection more caudally.  The tibioperoneal trunk, peroneal artery and posterior tibial artery and a few other side branches were dissected out and placed in Vesseloops.  None of these vessels had any pulsation in them.  We found a soft spot for a potential anastomosis just above the bifurcation of the tibioperoneal trunk.  Through the same incision the great saphenous vein was also dissected free and all the side branches were ligated with silk suture.  Then we made an above-knee incision and also dissected out the great saphenous vein and ligated at the side branches.   Through the same incision above the knee we dissected into the popliteal fossa to create an anatomical tunnel between the supragenicular and infragenicular popliteal artery.    7000 units of heparin were administered.  The great saphenous vein was clamped at the saphenofemoral junction and disconnected from the common femoral vein.  The venotomy thus created was closed with a 5-0 Prolene suture in Indra configuration.  The distal external iliac artery was clamped.  Side branches which were encircled with Vesseloops were tightened.  Superficial femoral and deep femoral arteries were clamped.  A 5 cm arteriotomy was created in the distal common femoral and proximal superficial femoral arteries.  There was heavy calcification in this area which was endarterectomized.  The endarterectomized segment of the arteries was closed with a bovine pericardial patch.  On the medial side the arteriotomy was closed only partially.  The open end of the great saphenous vein was spatulated.  First valve was directly lysed under direct visual guidance.  Vein was transposed over through the artery and sutured in end-to-side fashion using running 6-0 Prolene sutures.  Prior to completion of the suture line the deep and superficial femoral arteries were unclamped and allowed to backbleed, there was good backbleeding, the reclamped.  Externally artery was unclamped and there was robust inflow.  It was reclamped.  Suture line was completed.  There was excellent pulsation in the common femoral, superficial femoral and deep femoral arteries.  There was excellent pulsation in the proximal aspect of the great saphenous vein conduit.  At the distal and the vein was clipped with titanium clips and divided.  Then we passed a LeMaitre valvulotome up to the saphenofemoral junction and under arterialized pressure it was pulled back lysing all the valves.  There was excellent pulsation at the end of the conduit.  The conduit was then tunneled  anatomically from the suprageniculate incision into the infrageniculate incision going between the heads of the gastrocnemius muscle and in the intercondylar fossa of the femur.  The Vesseloops on the popliteal artery, posterior tibial artery, peroneal artery and its branches were tightened.  Additional 4000 units of heparin were given.  A 1.2 cm arteriotomy was created in the tibioperoneal trunk.  The vein graft was then spatulated generously.  Vein graft was sutured to the tibioperoneal trunk in end-to-side fashion with 7-0 Prolene suture.  2 suture lines were created, 1 at the heel and one of the toe.  Adequate backbleeding and for bleeding were performed.  Suture line was completed.  Flow was established retrograde into the popliteal artery and then into the tibioperoneal trunk.  There was excellent pulsation in the conduit.  There was a biphasic signal in the proximal peroneal and posterior tibial arteries and the bypass conduit.  There was a biphasic signal in the posterior tibial and peroneal arteries distally which were dependent entirely on the conduit.    Procedure was concluded.  Adequate hemostasis was achieved.  30 mg of protamine were given.  The gastrocnemius muscle was transposed over to the soleus to cover the conduit and tacked in place with interrupted 2-0 Vicryl suture.  Subdermal layer in the leg and thigh were closed with interrupted 3-0 Vicryl sutures.  Skin was approximated with staples.  The left groin tissue appeared unhealthy and therefore I will load close the Miriam's layer and then put a temporary sutures with a wound VAC for later delayed primary closure.    Counts of instruments, sponges and needles were noted to be correct.    Patient was awakened and extubated and taken to the recovery room in stable condition.

## 2024-10-25 NOTE — PLAN OF CARE
Goal Outcome Evaluation:  DATE & TIME: 10/24/24 [id number]   Cognitive Concerns/ Orientation : A&O x 4   BEHAVIOR & AGGRESSION TOOL COLOR: Green, calm/cooperative  CIWA SCORE: NA   ABNL VS/O2: VSS on RA  MOBILITY: Up SBA with gait belt and cane  PAIN MANAGMENT: c/o L leg pain-medicated with Dilaudid  DIET: 2 gram Sodium-good appetite. NPO at midnight for surgery  BOWEL/BLADDER: Continent of bowel/bladder. Last BM today, constipation resolved now soft/loose, Senna held at hs  ABNL LAB/BG: Hep 0.43 recheck at 2045-0.39, next check in am, Creat 8.16- on PD-connected tonight around 1900,   DRAIN/DEVICES: Right upper arm  PIV infusing Heparin at 1400 units/hr. Right wrist PIV SL. PD catheter to LLQ of abdomen  TELEMETRY RHYTHM: NA  SKIN: Dusky. Scattered bruises. Left great toe black with dry scab, gangrenous, luis streaking extending up foot to shin improved. Heels/feet are dry and flaky. Dressing to PD site was changed. Site WDL  TESTS/PROCEDURES: PD tonight and plans for vascular bypass surgery tomorrow 10/25 at 0840. PD should be hooked up early (around 1930) so it is completed by the time pre-op calls in morning  D/C DATE: TBD- may need further surgery after vascular intervention. Podiatry following.   OTHER IMPORTANT INFO: Pt alert/pleasant, Heparin gtt infusing at 1400 units/hour until am, Nephrology following. Per Vascular heparin gtt can continue, stop on call to OR

## 2024-10-25 NOTE — PROVIDER NOTIFICATION
Dr Sullivan at bedside, RN to wean pressors, notified , 2u insulin administered, will recheck. Pt with dopplerable  pulse to LLE, amita hugger in place to aid in reperfusion.  Dr Gray checking on pt. Some indurated area near VAC site, Surgeon aware and no concerns, just freq doppler checks and keeping limb warm.     1845 Dr Sullivan at bedside, RN will remove arterial line-held pressure. WNL

## 2024-10-25 NOTE — PROGRESS NOTES
[facility]    Medicine Progress Note - Hospitalist Service    Date of Admission:  10/15/2024  Interval history:   Patient is comfortable.  Planning for surgery tomorrow.  He does acknowledge that he is a little bit nervous about it.  Would like to try a little higher dose of Dilaudid than his current dose.   Still somewhat constipated.  Gets a little bit concerned about pain meds so that he does not get constipated.  Ready to go through surgery tomorrow    Assessment & Plan   Mr. Arnulfo Pritchett is a 64 yo male with Afib on warfarin, GERD, gout, insomnia, ESRD on PD, HFrEF, HTN, IDDM, diabetic peripheral neuropathy, CAD s/p stent placement x 3 (last 2021), TALI, HLD, and RLE PAD s/p balloon angio and stenting of SFA/popliteal arteries presented to [facility] ED with worsening pain and redness in left toe with streaking up the leg  for the last 2 days with throbbing pain.      Known history of peripheral vascular disease.  1) Admitted through [facility] 5/6- 5/11/2024 with right lower extremity arterial occlusion.  Prescribed Eliquis but having difficulties affording it over the 6 months prior to arrival.  Underwent right leg angiogram and recannulization of the SFA popliteal artery with balloon angioplasty and stenting (warfarin with heparin bridge and continued on Plavix with stop of aspirin)   2) Admit admit 7/9 to 7/11/2024 [facility] for left great toe blackening 7/9/2024 (had been treated with Augmentin.  X-ray showing no osteomyelitis or fracture): Arterial ultrasound with patent flow but x-ray 7/9 showing first phalangeal irregularity consistent with osteomyelitis.  Initially on IV Vanco and Zosyn.  Seen by podiatry debrided left great toe with no overt evidence of left foot osteomyelitis.  Thought to be a left great toe infection.  Received IV Vanco and Zosyn for 3 days and discharged on Augmentin for 2 weeks    Seen by podiatry 10/7 with left toe ulcer: Entire  left hallux nail plate poorly attached with a subungual hematoma:   Current admission on 10/15/2024 >> increasing pain throbbing redness red streaking on his left foot extending from the left toe.     ## Worsening L great toe wound    ## Hx of acute RLE arterial occlusion s/p SFA popliteal balloon angioplasty and stenting, 5/6/2024 (on warfarin and plavix)   Presented to OSH with 2 day history of erythema and streaking redness going up his leg with associated increase pain and throbbing noted. No fever but both CRP 55 and ESR 86 are elevated.   - XR of left foot show:Soft tissue swelling great toe. There is subtle cortical  irregularity and probable erosive change along the distal phalanx/tuft  of the great toe which does raise concern for osteomyelitis.     Obtained MRI foot ->  Distal phalanx of the first digit appears pointed distally with replacement of normal fatty marrow and a soft tissue defect at the distal tuft. About 14 mm of the distal phalanx appear normal with normal fat signal and without significant edema. Constellation of findings favors gangrene.  Podiatry consultation -> will need at least a partial left hallux amputation, once/if vascular status is optimized.   Vascular surgery consultation  IR consulted -> 10/17 CTA abdomen/pelvis runoff for further evaluation of disease and to guide procedure planning.    - Right leg with no inflow or femoral-popliteal segment obstructions.  Occluded anterior tibial artery origin with proximal reconstruction without distal stenosis.Normal caliber peroneal artery. Tandem severe stenosis of the distal posterior tibial artery.   - Left leg: No inflow obstruction. Focal calcified severe stenosis of the proximal superficial femoral artery. Long segment occlusion of the mid to distal popliteal artery with low attenuation changes which may represent thrombus or soft  atherosclerotic plaque. Reconstitution of the origins of the posterior tibial arteries. Severe tandem  stenoses of the distal posterior tibial artery.  Holding warfarin and plavix in anticipation for possible surgical intervention and allowed INR to drift down.   10/20 INR to 1.7 (started on heparin drip)   10/20 as per vascular note planning for left femoral to below the knee popliteal in situ bypass graft with embolectomy.   Patient is not a candidate for attempted endovascular thrombectomy since high likelihood of embolization per prior discussions   10/22 ID consult: Discontinue Zosyn. (Suspected discoloration from ischemia and not cellulitis) changed to oral Augmentin until bypass surgery.  10/22 discussion regarding pain control.  Okay for patient to have opioids in the setting of his peripheral artery disease and upcoming surgery  10/24 anticipation of surgery tomorrow.     Pain control  -Patient wanted low-dose pain meds for pain control  -Initially 10/22 discussion about oxycodone or Dilaudid.  He received oxy but wants to do Dilaudid  -Changed to oral solution of Dilaudid as cannot cut the pill in for starting at 0.5 every 6 as needed  -Increase bowel regimen as he feels he might get constipated  -10/24 will increase to 1 mg Dilaudid every 6 as needed     ## Chronic paroxysmal afib s/p atrial ablation, 6/2023  ## HFrEF 2/2 ICM  ## HLD  ## HTN  ## Hx of CAD s/p ALESIA placement (last 2021)  PTA now warfarin and Plavix instead of apixaban as above, as well as Entresto, Coreg, torsemide and statin.  Most recent EF last month on 6/4 with severely decrease LV function with est EF 20-25%.    Noted blood pressure soft 90/70s -> BPs now normalized  Decrease Coreg from 12.5 mg BID to 6.25 mg BID  Holding Torsemide  resume Entresto  Resume statin  Cardiology consult for presurgical risk assessment with bypass planned   10/22 Isabell scan abnormal.  No evidence of ischemia.  Medium sized area of infarction in the entire inferolateral left ventricle extending into the inferolateral segment of left ventricle and basal  segment.  Small area of nontransmural infarction apex.  EF 26  10/23 cardiology follow-up no ischemia.  Old infarcts.  No high risk findings.  High risk for volume overload but okay to proceed        ## ESRD on PD 2/2 DM nephropathy  ## Secondary hyperparathyroidism  ## Hyperkalemia  PTA on nightly PD of which he has been tolerating. Access RLQ double cuffed coiled PD catheter placed 4/16/2021.   - Nephrology consulted for PD planning  - Continue RRT medications below  - Continue calcitriol, cinacalcet and sevelamer carbonate  - Continue every other day gentamicin cream to PD cath side  - Telemetry for Hyperkalemia 5.7 -> now wnl -> tele stopped  - [facility] for hyperkalemia   - management per nephrology     ## Hx of RCC s/p R nephrectomy, 5/2022  -  CTA with 4 mm hyperenhancing nodule in lower pole of the left kidney   - follow up evaluation   -10/22 discussion with patient today who is aware of this finding and will follow-up with his urologist after discharge     ## DM type II  ## Diabetic neuropathy  Mod cho diet  Medium intensity sliding scale insulin  Last A1c 5.9 7/9/24  -10/24 blood sugars reviewed 169 - 147    Constipation:   -Struggles with constipation on MiraLAX daily  -Added Dulcolax suppository scheduled  -Senna twice daily  -10/24 BM resolved     # Other chronic, stable medical conditions:  # Hx of gout: Continue PTA allopurinol  # TALI: Intolerant to nasal CPAP   # GERD: Continue daily PPI  # Insomnia: Continue PTA melatonin.         Diet: 2 Gram Sodium Diet  NPO for Medical/Clinical Reasons Except for: Meds    DVT Prophylaxis: heparin drip   Rosario Catheter: Not present  Lines: None     Cardiac Monitoring: None  Code Status: Full Code      Clinically Significant Risk Factors          # Hypochloremia: Lowest Cl = 97 mmol/L in last 2 days, will monitor as appropriate      # Hypoalbuminemia: Lowest albumin = 2 g/dL at 10/22/2024  6:48 AM, will monitor as appropriate    # Coagulation Defect: INR = 1.21 (Ref  range: 0.85 - 1.15) and/or PTT = N/A, will monitor for bleeding    # Hypertension: Noted on problem list               # Financial/Environmental Concerns: none         Disposition Plan     Medically Ready for Discharge: Anticipated in 2-4 Days       PAMELLA HIGGINBOTHAM MD  Hospitalist Service  [facility]  Securely message with [email or web address] (more info)  Text page via [employer] Paging/Directory   ______________________________________________________________________      Physical Exam   Vital Signs: Temp: 98.7  F (37.1  C) Temp src: Oral BP: (!) 143/98 Pulse: 92   Resp: 18 SpO2: 96 % O2 Device: None (Room air)    Weight: 175 lbs 6.4 oz    General Appearance: Patient is awake and alert.  Respiratory: Clear to auscultation bilaterally without wheezes or rales  Cardiovascular: Regular rate and rhythm without gallop rub normal S1  and S2   GI: Positive bowel sounds soft no rebound guarding  Skin: Left toe is black in appearance on the tip.  There is erythematous purpleish change on the more proximal part of his foot      Medical Decision Making       45 MINUTES SPENT BY ME on the date of service doing chart review, history, exam, documentation & further activities per the note.        Data     I have personally reviewed the following data over the past 24 hrs:    N/A  \   N/A   / N/A     138 98 40.5 (H) /  149 (H)   4.0 28 8.16 (H) \     ALT: N/A AST: N/A AP: N/A TBILI: N/A   ALB: 2.1 (L) TOT PROTEIN: N/A LIPASE: N/A     INR:  1.21 (H) PTT:  N/A   D-dimer:  N/A Fibrinogen:  N/A       Imaging results reviewed over the past 24 hrs:   No results found for this or any previous visit (from the past 24 hours).

## 2024-10-25 NOTE — ANESTHESIA PROCEDURE NOTES
Arterial Line Procedure Note    Pre-Procedure   Staff -        Anesthesiologist:  Gordon Cheng MD       Performed By: Anesthesiologist       Location: pre-op       Pre-Anesthestic Checklist: patient identified, IV checked, site marked, risks and benefits discussed, informed consent, monitors and equipment checked, pre-op evaluation and at physician/surgeon's request  Timeout:       Correct Patient: Yes        Correct Procedure: Yes        Correct Site: Yes        Correct Position: Yes   Line Placement:   This line was placed Pre Induction starting at 10/25/2024 8:30 AM and ending at 10/25/2024 8:15 AM  Procedure   Procedure: arterial line, new line and elective       Laterality: left       Insertion Site: brachial (Radial).  Sterile Prep        All elements of maximal sterile barrier technique followed       Patient Prep/Sterile Barriers: hand hygiene, sterile gloves, hat, mask, draped, sterile gown, draped       Skin prep: Chloraprep  Insertion/Injection        Technique: Seldinger Technique        Catheter Type/Size: 20 gauge, 1.75 in/4.5 cm quick cath (integral wire)  Narrative         Secured by: anchor securement device       Tegaderm dressing used.       Complications: None apparent,        Arterial waveform: Yes        IBP within 10% of NIBP: Yes

## 2024-10-25 NOTE — PLAN OF CARE
Summary: Presented to [facility] ED with worsening pain and redness to L great toe and streaking up leg ongoing for 2 days.      DATE & TIME: 10/24/24, 2300 - 0730   Cognitive Concerns/ Orientation : A&O x 4   BEHAVIOR & AGGRESSION TOOL COLOR: Green, calm/cooperative  CIWA SCORE: NA   ABNL VS/O2: VSS on room air  MOBILITY: Up SBA with gait belt and cane  PAIN MANAGMENT: Denied  DIET: NPO was maintained after midnight for surgery this AM  BOWEL/BLADDER: Continent of bowel/bladder. No BM this shift  ABNL LAB/BG: , AM labs pending  DRAIN/DEVICES: Right upper arm  PIV infusing Heparin at 1400 units/hr. Right wrist PIV SL. PD catheter to LLQ of abdomen  TELEMETRY RHYTHM: NA  SKIN: Dusky. Scattered bruises. Left great toe black with dry scab, gangrenous, luis streaking extending up foot to shin improved. Heels/feet are dry and flaky. Dressing to PD site remained CDI  TESTS/PROCEDURES: PD overnight. Plans for vascular bypass surgery today 10/25/24 at 0840.   D/C DATE: TBD, may need further surgery after vascular intervention  OTHER IMPORTANT INFO: Podiatry and Nephrology following. Per Vascular Surgery Heparin gtt can continue, stop on call to OR. Heparin was stopped at 0645 on call from OR nurse    Goal Outcome Evaluation:      Plan of Care Reviewed With: patient    Overall Patient Progress: no changeOverall Patient Progress: no change

## 2024-10-25 NOTE — PROGRESS NOTES
Cycler set up per protocol and per treatment orders     Results from previous treatment:  Effluent color and clarity: unable to obtain. Drain bag was discarded upon set up. Machine was turned off upon set up unable to obtain information.       Cycler Serial Number: [id number]  Cassette Lot Number: [id number]; Expiration Date: 2027  Total Treatment Volume: 10,000 mL  Total treatment time: 9 hrs  Fill Volume: 2000 ml  Last Fill Volume:0 ml  Heater ba.5% 6000mL;  Lot #: [id number];  Expiration Date:   Side Bag #1: 2.5% 6000mL;  Lot #: [id number];  Expiration Date:     Number of cycles including final fill: 5  Dwell Time: 1:22 minutes    PD orders reviewed with Patient procedure and ESRD teaching done and questions answered.  Cycler ready for hook-up.    Report given to: primary RN.

## 2024-10-26 ENCOUNTER — ANESTHESIA EVENT (OUTPATIENT)
Dept: SURGERY | Facility: CLINIC | Age: 64
End: 2024-10-26
Payer: MEDICARE

## 2024-10-26 LAB
ALBUMIN SERPL BCG-MCNC: 2.6 G/DL (ref 3.5–5.2)
ANION GAP SERPL CALCULATED.3IONS-SCNC: 14 MMOL/L (ref 7–15)
BUN SERPL-MCNC: 41.7 MG/DL (ref 8–23)
CALCIUM SERPL-MCNC: 9.1 MG/DL (ref 8.8–10.4)
CHLORIDE SERPL-SCNC: 97 MMOL/L (ref 98–107)
CREAT SERPL-MCNC: 7.86 MG/DL (ref 0.67–1.17)
EGFRCR SERPLBLD CKD-EPI 2021: 7 ML/MIN/1.73M2
ERYTHROCYTE [DISTWIDTH] IN BLOOD BY AUTOMATED COUNT: 14.5 % (ref 10–15)
GLUCOSE BLDC GLUCOMTR-MCNC: 241 MG/DL (ref 70–99)
GLUCOSE BLDC GLUCOMTR-MCNC: 264 MG/DL (ref 70–99)
GLUCOSE BLDC GLUCOMTR-MCNC: 273 MG/DL (ref 70–99)
GLUCOSE BLDC GLUCOMTR-MCNC: 291 MG/DL (ref 70–99)
GLUCOSE BLDC GLUCOMTR-MCNC: 304 MG/DL (ref 70–99)
GLUCOSE SERPL-MCNC: 340 MG/DL (ref 70–99)
HCO3 SERPL-SCNC: 24 MMOL/L (ref 22–29)
HCT VFR BLD AUTO: 21.9 % (ref 40–53)
HGB BLD-MCNC: 7.4 G/DL (ref 13.3–17.7)
INR PPP: 1.28 (ref 0.85–1.15)
MCH RBC QN AUTO: 34.1 PG (ref 26.5–33)
MCHC RBC AUTO-ENTMCNC: 33.8 G/DL (ref 31.5–36.5)
MCV RBC AUTO: 101 FL (ref 78–100)
PHOSPHATE SERPL-MCNC: 5.8 MG/DL (ref 2.5–4.5)
PLATELET # BLD AUTO: 245 10E3/UL (ref 150–450)
POTASSIUM SERPL-SCNC: 4.4 MMOL/L (ref 3.4–5.3)
RBC # BLD AUTO: 2.17 10E6/UL (ref 4.4–5.9)
SODIUM SERPL-SCNC: 135 MMOL/L (ref 135–145)
UFH PPP CHRO-ACNC: 0.26 IU/ML
UFH PPP CHRO-ACNC: <0.1 IU/ML
WBC # BLD AUTO: 10.4 10E3/UL (ref 4–11)

## 2024-10-26 PROCEDURE — 90945 DIALYSIS ONE EVALUATION: CPT

## 2024-10-26 PROCEDURE — 250N000013 HC RX MED GY IP 250 OP 250 PS 637: Performed by: SPECIALIST

## 2024-10-26 PROCEDURE — 250N000013 HC RX MED GY IP 250 OP 250 PS 637: Performed by: STUDENT IN AN ORGANIZED HEALTH CARE EDUCATION/TRAINING PROGRAM

## 2024-10-26 PROCEDURE — 250N000011 HC RX IP 250 OP 636: Performed by: SURGERY

## 2024-10-26 PROCEDURE — 36415 COLL VENOUS BLD VENIPUNCTURE: CPT | Performed by: INTERNAL MEDICINE

## 2024-10-26 PROCEDURE — 82040 ASSAY OF SERUM ALBUMIN: CPT | Performed by: STUDENT IN AN ORGANIZED HEALTH CARE EDUCATION/TRAINING PROGRAM

## 2024-10-26 PROCEDURE — 250N000013 HC RX MED GY IP 250 OP 250 PS 637

## 2024-10-26 PROCEDURE — 250N000013 HC RX MED GY IP 250 OP 250 PS 637: Performed by: INTERNAL MEDICINE

## 2024-10-26 PROCEDURE — 250N000012 HC RX MED GY IP 250 OP 636 PS 637: Performed by: INTERNAL MEDICINE

## 2024-10-26 PROCEDURE — 99232 SBSQ HOSP IP/OBS MODERATE 35: CPT | Performed by: INTERNAL MEDICINE

## 2024-10-26 PROCEDURE — 85610 PROTHROMBIN TIME: CPT | Performed by: INTERNAL MEDICINE

## 2024-10-26 PROCEDURE — 85520 HEPARIN ASSAY: CPT | Performed by: SURGERY

## 2024-10-26 PROCEDURE — 99232 SBSQ HOSP IP/OBS MODERATE 35: CPT | Mod: 57 | Performed by: PODIATRIST

## 2024-10-26 PROCEDURE — 85014 HEMATOCRIT: CPT | Performed by: STUDENT IN AN ORGANIZED HEALTH CARE EDUCATION/TRAINING PROGRAM

## 2024-10-26 PROCEDURE — 82310 ASSAY OF CALCIUM: CPT | Performed by: STUDENT IN AN ORGANIZED HEALTH CARE EDUCATION/TRAINING PROGRAM

## 2024-10-26 PROCEDURE — 90945 DIALYSIS ONE EVALUATION: CPT | Performed by: INTERNAL MEDICINE

## 2024-10-26 PROCEDURE — 120N000001 HC R&B MED SURG/OB

## 2024-10-26 PROCEDURE — 85520 HEPARIN ASSAY: CPT | Performed by: INTERNAL MEDICINE

## 2024-10-26 PROCEDURE — 36415 COLL VENOUS BLD VENIPUNCTURE: CPT | Performed by: SURGERY

## 2024-10-26 RX ORDER — GENTAMICIN SULFATE 1 MG/G
CREAM TOPICAL DAILY PRN
Status: DISCONTINUED | OUTPATIENT
Start: 2024-10-27 | End: 2024-10-29

## 2024-10-26 RX ORDER — HEPARIN SODIUM 10000 [USP'U]/100ML
0-5000 INJECTION, SOLUTION INTRAVENOUS CONTINUOUS
Status: DISCONTINUED | OUTPATIENT
Start: 2024-10-26 | End: 2024-11-01

## 2024-10-26 RX ORDER — GENTAMICIN SULFATE 1 MG/G
CREAM TOPICAL DAILY PRN
Status: DISCONTINUED | OUTPATIENT
Start: 2024-10-26 | End: 2024-10-26

## 2024-10-26 RX ADMIN — SEVELAMER CARBONATE 800 MG: 800 TABLET, FILM COATED ORAL at 14:34

## 2024-10-26 RX ADMIN — ACETAMINOPHEN 975 MG: 325 TABLET, FILM COATED ORAL at 22:16

## 2024-10-26 RX ADMIN — AMOXICILLIN AND CLAVULANATE POTASSIUM 1 TABLET: 500; 125 TABLET, FILM COATED ORAL at 08:19

## 2024-10-26 RX ADMIN — INSULIN ASPART 4 UNITS: 100 INJECTION, SOLUTION INTRAVENOUS; SUBCUTANEOUS at 14:33

## 2024-10-26 RX ADMIN — INSULIN ASPART 4 UNITS: 100 INJECTION, SOLUTION INTRAVENOUS; SUBCUTANEOUS at 09:06

## 2024-10-26 RX ADMIN — SACUBITRIL AND VALSARTAN 1 TABLET: 49; 51 TABLET, FILM COATED ORAL at 20:44

## 2024-10-26 RX ADMIN — HEPARIN SODIUM 1000 UNITS/HR: 10000 INJECTION, SOLUTION INTRAVENOUS at 08:57

## 2024-10-26 RX ADMIN — OXYCODONE HYDROCHLORIDE 5 MG: 5 TABLET ORAL at 13:57

## 2024-10-26 RX ADMIN — OXYCODONE HYDROCHLORIDE 5 MG: 5 TABLET ORAL at 20:44

## 2024-10-26 RX ADMIN — ASPIRIN 81 MG: 81 TABLET, COATED ORAL at 08:19

## 2024-10-26 RX ADMIN — CARVEDILOL 6.25 MG: 6.25 TABLET, FILM COATED ORAL at 22:16

## 2024-10-26 RX ADMIN — SENNOSIDES AND DOCUSATE SODIUM 1 TABLET: 50; 8.6 TABLET ORAL at 20:44

## 2024-10-26 RX ADMIN — ALLOPURINOL 200 MG: 100 TABLET ORAL at 20:44

## 2024-10-26 RX ADMIN — POLYETHYLENE GLYCOL 3350 17 G: 17 POWDER, FOR SOLUTION ORAL at 08:20

## 2024-10-26 RX ADMIN — INSULIN ASPART 2 UNITS: 100 INJECTION, SOLUTION INTRAVENOUS; SUBCUTANEOUS at 22:18

## 2024-10-26 RX ADMIN — SACUBITRIL AND VALSARTAN 1 TABLET: 49; 51 TABLET, FILM COATED ORAL at 08:20

## 2024-10-26 RX ADMIN — ATORVASTATIN CALCIUM 40 MG: 40 TABLET, FILM COATED ORAL at 22:16

## 2024-10-26 RX ADMIN — SEVELAMER CARBONATE 800 MG: 800 TABLET, FILM COATED ORAL at 17:56

## 2024-10-26 RX ADMIN — ACETAMINOPHEN 975 MG: 325 TABLET, FILM COATED ORAL at 05:47

## 2024-10-26 RX ADMIN — ACETAMINOPHEN 975 MG: 325 TABLET, FILM COATED ORAL at 14:33

## 2024-10-26 RX ADMIN — PANTOPRAZOLE SODIUM 40 MG: 40 TABLET, DELAYED RELEASE ORAL at 08:20

## 2024-10-26 RX ADMIN — CALCITRIOL CAPSULES 0.25 MCG 0.25 MCG: 0.25 CAPSULE ORAL at 22:16

## 2024-10-26 RX ADMIN — SEVELAMER CARBONATE 800 MG: 800 TABLET, FILM COATED ORAL at 08:20

## 2024-10-26 RX ADMIN — INSULIN ASPART 3 UNITS: 100 INJECTION, SOLUTION INTRAVENOUS; SUBCUTANEOUS at 17:52

## 2024-10-26 RX ADMIN — PANTOPRAZOLE SODIUM 40 MG: 40 TABLET, DELAYED RELEASE ORAL at 20:44

## 2024-10-26 ASSESSMENT — LIFESTYLE VARIABLES: TOBACCO_USE: 1

## 2024-10-26 NOTE — ANESTHESIA PREPROCEDURE EVALUATION
Anesthesia Pre-Procedure Evaluation    Patient: Arnulfo Pritchett   MRN: [id number] : 1960        Procedure : Procedure(s):  AMPUTATION, TOE left great toe          Past Medical History:   Diagnosis Date    CAD (coronary artery disease)     Chronic systolic heart failure (H)     ESRD (end stage renal disease) on dialysis (H)     Gastroesophageal reflux disease without esophagitis     Gout     HLD (hyperlipidemia)     TALI (obstructive sleep apnea)     PAD (peripheral artery disease) (H)     S/p RLE stenting of SFA/popliteal arteries    Type 2 diabetes mellitus with diabetic neuropathy (H)       Past Surgical History:   Procedure Laterality Date    IR LOWER EXTREMITY ANGIOGRAM LEFT  10/17/2024      No Known Allergies   Social History     Tobacco Use    Smoking status: Former     Types: Cigarettes    Smokeless tobacco: Never   Substance Use Topics    Alcohol use: Not Currently     Comment: quit 20 years      Wt Readings from Last 1 Encounters:   10/26/24 82.3 kg (181 lb 7 oz)        Anesthesia Evaluation            ROS/MED HX  ENT/Pulmonary:     (+) sleep apnea,               tobacco use,                        Neurologic:     (+)              TIA, date: ,                 Cardiovascular:     (+) Dyslipidemia hypertension- Peripheral Vascular Disease-  CAD -  - stent-.      CHF                           Previous cardiac testing   Echo: Date: Results:    Stress Test:  Date: 10/22/24 Results:     The nuclear stress test is abnormal. No evidence of ischemia. presence of visecral tracer artifact decreases specificity.     There is a medium sized area of infarction in the entire inferolateral segment(s) of the left ventricle extending into basal inferior segment.     There is a small area of nontransmural infarction in the apical segment(s) of the left ventricle.     TID is absent.     Left ventricular function is severely reduced.     The left ventricular ejection fraction at stress is 26%.     There is no  "prior study for comparison.    ECG Reviewed:  Date: Results:    Cath:  Date: Results:      METS/Exercise Tolerance:     Hematologic:       Musculoskeletal:       GI/Hepatic:     (+) GERD,                   Renal/Genitourinary: Comment: S/p Right Nephrectomy for RCC    (+) renal disease, type: ESRD, Pt requires dialysis, type: Peritoneal dialysis,          Endo:     (+)  type II DM,       Diabetic complications: nephropathy neuropathy.             Psychiatric/Substance Use:       Infectious Disease:       Malignancy:       Other:            Physical Exam    Airway        Mallampati: II   TM distance: > 3 FB   Neck ROM: full   Mouth opening: > 3 cm    Respiratory Devices and Support         Dental       (+) Edentulous      Cardiovascular   cardiovascular exam normal          Pulmonary   pulmonary exam normal                OUTSIDE LABS:  CBC:   Lab Results   Component Value Date    WBC 10.4 10/26/2024    WBC 6.5 10/23/2024    HGB 7.4 (L) 10/26/2024    HGB 8.9 (L) 10/25/2024    HCT 21.9 (L) 10/26/2024    HCT 30.9 (L) 10/23/2024     10/26/2024     10/25/2024     BMP:   Lab Results   Component Value Date     10/26/2024     10/25/2024    POTASSIUM 4.4 10/26/2024    POTASSIUM 3.9 10/25/2024    CHLORIDE 97 (L) 10/26/2024    CHLORIDE 99 10/25/2024    CO2 24 10/26/2024    CO2 27 10/25/2024    BUN 41.7 (H) 10/26/2024    BUN 40.0 (H) 10/25/2024    CR 7.86 (H) 10/26/2024    CR 8.12 (H) 10/25/2024     (H) 10/26/2024     (H) 10/26/2024     COAGS:   Lab Results   Component Value Date    INR 1.28 (H) 10/26/2024     POC: No results found for: \"BGM\", \"HCG\", \"HCGS\"  HEPATIC:   Lab Results   Component Value Date    ALBUMIN 2.6 (L) 10/26/2024     OTHER:   Lab Results   Component Value Date    A1C 5.7 (H) 10/16/2024    TERENCE 9.1 10/26/2024    PHOS 5.8 (H) 10/26/2024    SED 86 (H) 10/15/2024       Anesthesia Plan    ASA Status:  3    NPO Status:  NPO Appropriate    Anesthesia Type: MAC.     - Reason " for MAC: immobility needed, straight local not clinically adequate              Consents    Anesthesia Plan(s) and associated risks, benefits, and realistic alternatives discussed. Questions answered and patient/representative(s) expressed understanding.     - Discussed:     - Discussed with:  Patient      - Extended Intubation/Ventilatory Support Discussed: No.      - Patient is DNR/DNI Status: No     Use of blood products discussed: Yes.     - Discussed with: Patient.     - Consented: consented to blood products            Reason for refusal: other.     Postoperative Care    Pain management: IV analgesics, Oral pain medications, Multi-modal analgesia.   PONV prophylaxis: Ondansetron (or other 5HT-3), Dexamethasone or Solumedrol, Background Propofol Infusion     Comments:               Ervin Gonzalez, DO    I have reviewed the pertinent notes and labs in the chart from the past 30 days and (re)examined the patient.  Any updates or changes from those notes are reflected in this note.      # Hypochloremia: Lowest Cl = 97 mmol/L in last 2 days, will monitor as appropriate   # Hypercalcemia: corrected calcium is >10.1, will monitor as appropriate    # Hypoalbuminemia: Lowest albumin = 2 g/dL at 10/25/2024  6:08 AM, will monitor as appropriate  # Coagulation Defect: INR = 1.28 (Ref range: 0.85 - 1.15) and/or PTT = N/A, will monitor for bleeding    # Hypertension: Noted on problem list               # Financial/Environmental Concerns: none

## 2024-10-26 NOTE — PROGRESS NOTES
[facility]    Medicine Progress Note - Hospitalist Service    Date of Admission:  10/15/2024  Interval history:   Patient appears to be comfortable.  He had a long surgery yesterday which apparently was delayed in the morning and then able to get done later in the day but he was in the PACU until supper.  He is feeling quite good today.  Getting back on a diet.    Assessment & Plan   Mr. Arnulfo Pritchett is a 62 yo male with Afib on warfarin, GERD, gout, insomnia, ESRD on PD, HFrEF, HTN, IDDM, diabetic peripheral neuropathy, CAD s/p stent placement x 3 (last 2021), TALI, HLD, and RLE PAD s/p balloon angio and stenting of SFA/popliteal arteries presented to [facility] ED with worsening pain and redness in left toe with streaking up the leg  for the last 2 days with throbbing pain.      Known history of peripheral vascular disease.  1) Admitted through [facility] 5/6- 5/11/2024 with right lower extremity arterial occlusion.  Prescribed Eliquis but having difficulties affording it over the 6 months prior to arrival.  Underwent right leg angiogram and recannulization of the SFA popliteal artery with balloon angioplasty and stenting (warfarin with heparin bridge and continued on Plavix with stop of aspirin)   2) Admit admit 7/9 to 7/11/2024 [facility] for left great toe blackening 7/9/2024 (had been treated with Augmentin.  X-ray showing no osteomyelitis or fracture): Arterial ultrasound with patent flow but x-ray 7/9 showing first phalangeal irregularity consistent with osteomyelitis.  Initially on IV Vanco and Zosyn.  Seen by podiatry debrided left great toe with no overt evidence of left foot osteomyelitis.  Thought to be a left great toe infection.  Received IV Vanco and Zosyn for 3 days and discharged on Augmentin for 2 weeks    Seen by podiatry 10/7 with left toe ulcer: Entire left hallux nail plate poorly attached with a subungual hematoma:   Current admission on  10/15/2024 >> increasing pain throbbing redness red streaking on his left foot extending from the left toe.     Worsening L great toe wound    Presented to OSH with 2 day history of erythema and streaking redness going up his leg with associated increase pain and throbbing noted. No fever but both CRP 55 and ESR 86 are elevated.   - XR of left foot show:Soft tissue swelling great toe. There is subtle cortical  irregularity and probable erosive change along the distal phalanx/tuft  of the great toe which does raise concern for osteomyelitis.     Obtained MRI foot ->  Distal phalanx of the first digit appears pointed distally with replacement of normal fatty marrow and a soft tissue defect at the distal tuft. About 14 mm of the distal phalanx appear normal with normal fat signal and without significant edema. Constellation of findings favors gangrene.  Podiatry consultation -> will need at least a partial left hallux amputation, once/if vascular status is optimized.   - 10/22 ID consult: Discontinue Zosyn. (Suspected discoloration from ischemia and not cellulitis) changed to oral Augmentin until bypass surgery.  - 10/26 podiatry follow-up with plans for partial total removal for eschar to prevent infection.  - Minimal weightbearing after surgery.  In a weightbearing boot  - Surgery scheduled for 10/27.  - Patient seen by ID as well      ## Hx of acute RLE arterial occlusion s/p SFA popliteal balloon angioplasty and stenting, 5/6/2024 (on warfarin and plavix)   Vascular surgery consultation  IR consulted -> 10/17 CTA abdomen/pelvis runoff for further evaluation of disease and to guide procedure planning.    - Right leg with no inflow or femoral-popliteal segment obstructions.  Occluded anterior tibial artery origin with proximal reconstruction without distal stenosis.Normal caliber peroneal artery. Tandem severe stenosis of the distal posterior tibial artery.   - Left leg: No inflow obstruction. Focal calcified severe  stenosis of the proximal superficial femoral artery. Long segment occlusion of the mid to distal popliteal artery with low attenuation changes which may represent thrombus or soft  atherosclerotic plaque. Reconstitution of the origins of the posterior tibial arteries. Severe tandem stenoses of the distal posterior tibial artery.  Holding warfarin and plavix in anticipation for possible surgical intervention and allowed INR to drift down.   10/20 INR to 1.7 (started on heparin drip)   10/20 as per vascular note planning for left femoral to below the knee popliteal in situ bypass graft with embolectomy.   Patient is not a candidate for attempted endovascular thrombectomy since high likelihood of embolization per prior discussions   10/25 left femoral endarterectomy, left femoral to tibioperoneal bypass with left great saphenous vein with wound VAC placement.  10/26 follow-up with vascular surgery starting of heparin  Will need to get plan for anticoagulation resumption after surgery (on Coumadin prior to admit)       Pain control  -Patient wanted low-dose pain meds for pain control  -Initially 10/22 discussion about oxycodone or Dilaudid.  He received oxy but wants to do Dilaudid  -Changed to oral solution of Dilaudid as cannot cut the pill in for starting at 0.5 every 6 as needed  -Increase bowel regimen as he feels he might get constipated  -10/24 will increase to 1 mg Dilaudid every 6 as needed     ## Chronic paroxysmal afib s/p atrial ablation, 6/2023  ## HFrEF 2/2 ICM  ## HLD  ## HTN  ## Hx of CAD s/p ALESIA placement (last 2021)  PTA now warfarin and Plavix instead of apixaban as above, as well as Entresto, Coreg, torsemide and statin.  Most recent EF last month on 6/4 with severely decrease LV function with est EF 20-25%.    Noted blood pressure soft 90/70s -> BPs now normalized  Decrease Coreg from 12.5 mg BID to 6.25 mg BID  Holding Torsemide  resume Entresto  Resume statin  Cardiology consult for presurgical  risk assessment with bypass planned   10/22 Isabell scan abnormal.  No evidence of ischemia.  Medium sized area of infarction in the entire inferolateral left ventricle extending into the inferolateral segment of left ventricle and basal segment.  Small area of nontransmural infarction apex.  EF 26  10/23 cardiology follow-up no ischemia.  Old infarcts.  No high risk findings.  High risk for volume overload but okay to proceed        ## ESRD on PD 2/2 DM nephropathy  ## Secondary hyperparathyroidism  ## Hyperkalemia  PTA on nightly PD of which he has been tolerating. Access RLQ double cuffed coiled PD catheter placed 4/16/2021.   - Nephrology consulted for PD planning  - Continue RRT medications below  - Continue calcitriol, cinacalcet and sevelamer carbonate  - Continue every other day gentamicin cream to PD cath side  - Telemetry for Hyperkalemia 5.7 -> now wnl -> tele stopped  - [facility] for hyperkalemia   - management per nephrology     ## Hx of RCC s/p R nephrectomy, 5/2022  -  CTA with 4 mm hyperenhancing nodule in lower pole of the left kidney   - follow up evaluation   -10/22 discussion with patient today who is aware of this finding and will follow-up with his urologist after discharge     ## DM type II  ## Diabetic neuropathy  Mod cho diet  Medium intensity sliding scale insulin  Last A1c 5.9 7/9/24  -10/24 blood sugars reviewed 169 - 147  -10/26 initially on low sodium diet but needed carb diet     Constipation:   -Struggles with constipation on MiraLAX daily  -Added Dulcolax suppository scheduled  -Senna twice daily  -10/24 BM resolved     # Other chronic, stable medical conditions:  # Hx of gout: Continue PTA allopurinol  # TALI: Intolerant to nasal CPAP   # GERD: Continue daily PPI  # Insomnia: Continue PTA melatonin.         Diet: 2 Gram Sodium Diet  NPO per Anesthesia Guidelines for Procedure/Surgery Except for: Meds    DVT Prophylaxis: heparin drip   Rosario Catheter: Not present  Lines: None     Cardiac  Monitoring: None  Code Status: Full Code      Clinically Significant Risk Factors          # Hypochloremia: Lowest Cl = 97 mmol/L in last 2 days, will monitor as appropriate   # Hypercalcemia: corrected calcium is >10.1, will monitor as appropriate    # Hypoalbuminemia: Lowest albumin = 2 g/dL at 10/25/2024  6:08 AM, will monitor as appropriate    # Coagulation Defect: INR = 1.28 (Ref range: 0.85 - 1.15) and/or PTT = N/A, will monitor for bleeding    # Hypertension: Noted on problem list               # Financial/Environmental Concerns: none         Disposition Plan     Medically Ready for Discharge: Anticipated in 2-4 Days       PAMELLA HIGGINBOTHAM MD  Hospitalist Service  [facility]  Securely message with [employer] (more info)  Text page via [employer] Paging/Directory   ______________________________________________________________________      Physical Exam   Vital Signs: Temp: 98.5  F (36.9  C) Temp src: Oral BP: 111/76 Pulse: 108   Resp: 16 SpO2: 97 % O2 Device: None (Room air) Oxygen Delivery: 2 LPM  Weight: 181 lbs 7.02 oz    General Appearance: Patient is awake and alert.  Respiratory: Clear to auscultation bilaterally without wheezes or rales  Cardiovascular: Regular rate and rhythm without gallop rub normal S1  and S2   GI: Positive bowel sounds soft no rebound guarding  Skin: Left toe is black in appearance on the tip.  There is erythematous purpleish change on the more proximal part of his foot      Medical Decision Making       45 MINUTES SPENT BY ME on the date of service doing chart review, history, exam, documentation & further activities per the note.        Data     I have personally reviewed the following data over the past 24 hrs:    10.4  \   7.4 (L)   / 245     135 97 (L) 41.7 (H) /  304 (H)   4.4 24 7.86 (H) \     ALT: N/A AST: N/A AP: N/A TBILI: N/A   ALB: 2.6 (L) TOT PROTEIN: N/A LIPASE: N/A     INR:  1.28 (H) PTT:  N/A   D-dimer:  N/A Fibrinogen:  N/A       Imaging results  reviewed over the past 24 hrs:   No results found for this or any previous visit (from the past 24 hours).

## 2024-10-26 NOTE — PROGRESS NOTES
Postoperative day 1 from left common femoral artery to be peroneal trunk bypass.  No complaints.  Just finished dialysis.  Dressings are intact.  Monophasic signals in the dorsalis pedis and posterior tibial arteries.  We will start heparin low-dose intensity.

## 2024-10-26 NOTE — PROGRESS NOTES
Cycler set up per protocol and per treatment orders      Results from previous treatment:  Effluent color and clarity: clear yellow with no fibrin tissue noted  Total UF: 966ml  Initial Drain: 313ml  Avg Dwell: 1:16  Lost Dwell: 0:29     Cycler Serial Number: [id number]   Total Treatment Volume: 10,000mL  Total treatment time: 9 hrs  Fill Volume: 2000ml  Last Fill Volume:0ml  Heater ba.5% 6000mL;  Lot #: [id number];  Expiration Date: 2026  Side Bag #1: 2.5% 6000mL;  Lot #: [id number];  Expiration Date: 2026  Cassette # [id number], exp.      Number of cycles including final fill: 5  Dwell Time: 1:22 minutes  Last Fill Volume: 0 ml  Initial Drain Alarm: 10 ml  PD orders reviewed with Patient procedure and ESRD teaching done and questions answered.  Cycler ready for hook-up.    Report given to: MARYSOL Downing consent form signed

## 2024-10-26 NOTE — PROGRESS NOTES
VSS. A/O. Up-1/walker. L groin vac, small drainage at incision site. Tender/pain l leg. Oxy given. L ischemic great toe & luis foot. Weak doppler monophasic pulses. Rosario removed, due to void, makes very low urine per pt, on peritoneal dialysis. PD site dressing CDI. NPO MN, Toe amp tomorrow. Hep gtt started, order to stop tomorrow 0530.

## 2024-10-26 NOTE — PROGRESS NOTES
[facility]    Nephrology Progress Note     Assessment & Plan       63 y.o man with ESRD and on PD     ESRD:      PD prescripton:  9 hrs  4 cylces  1676-8092 ml per cycle  Last fill of Extraneal of 1200 ml-He drains this around noon.  No last fill in hospital   volume status-no edema.  Controlled.     Severe PAD with left great toe wound -status post lower extremity bypass on 10/25     Anemia  CAD, EF of 20-25%: Seems like he is well compensated  CKD-MBD:               -Sensipar               -calcitriol     Plan:     Same PD Rx tonight.          Jennifer Jameson MD  [facility] - Nephrology   [phone]      Interval History     Status post lower extremity bypass yesterday.  New issues with peritoneal dialysis overnight.  Total UF of nearly 1 L.  Good clearance based on labs.    Physical Exam   Temp: 98.2  F (36.8  C) Temp src: Axillary BP: 115/77 Pulse: 106   Resp: 18 SpO2: 94 % O2 Device: None (Room air) Oxygen Delivery: 2 LPM  Vitals:    10/24/24 1200 10/25/24 0640 10/26/24 0622   Weight: 79.6 kg (175 lb 6.4 oz) 78.5 kg (173 lb 1.6 oz) 82.3 kg (181 lb 7 oz)     Vital Signs with Ranges  Temp:  [97.6  F (36.4  C)-98.4  F (36.9  C)] 98.2  F (36.8  C)  Pulse:  [] 106  Resp:  [8-52] 18  BP: ()/(64-90) 115/77  MAP:  [78 mmHg-102 mmHg] 78 mmHg  Arterial Line BP: ()/(63-83) 99/63  SpO2:  [91 %-100 %] 94 %  I/O last 3 completed shifts:  In: 1050 [P.O.:250; I.V.:175]  Out: 1225 [Urine:475; Blood:750]    GENERAL APPEARANCE: pleasant, NAD, a & o  RESP: Clear bilaterally  CV: RRR, nl S1/S2, no m/r/g   ABDOMEN: o/s/nt/nd, bs present.  PD catheter in place  EXTREMITIES/SKIN:  no edema    Medications   Current Facility-Administered Medications   Medication Dose Route Frequency Provider Last Rate Last Admin    dianeal PD LOW calcium-2.5% dex (calcium 2.5 mEq/L) 6,000 mL PERITONEAL DIALYSATE   Dialysis [facility] Supplied PD Harish Minor MD        dianeal PD LOW calcium-2.5% dex  (calcium 2.5 mEq/L) 6,000 mL PERITONEAL DIALYSATE   Dialysis [facility] Supplied PD Harish Minor MD        heparin 25,000 units in 0.45% NaCl 250 mL ANTICOAGULANT infusion  0-5,000 Units/hr Intravenous Continuous Raul Gray MD 10 mL/hr at 10/26/24 0857 1,000 Units/hr at 10/26/24 0857    Patient is already receiving anticoagulation with heparin, enoxaparin (LOVENOX), warfarin (COUMADIN)  or other anticoagulant medication   Does not apply Continuous PRN Chai España MD         Current Facility-Administered Medications   Medication Dose Route Frequency Provider Last Rate Last Admin    acetaminophen (TYLENOL) tablet 975 mg  975 mg Oral Q8H Cecilia Stearns NP   975 mg at 10/26/24 0547    allopurinol (ZYLOPRIM) tablet 200 mg  200 mg Oral QPM Chai España MD   200 mg at 10/24/24 2127    amoxicillin-clavulanate (AUGMENTIN) 500-125 MG per tablet 1 tablet  1 tablet Oral Q24H Laura Dubose MD   1 tablet at 10/26/24 0819    aspirin EC tablet 81 mg  81 mg Oral Daily Benjie Collins MD   81 mg at 10/26/24 0819    atorvastatin (LIPITOR) tablet 40 mg  40 mg Oral At Bedtime Chai España MD   40 mg at 10/25/24 2202    calcitRIOL (ROCALTROL) capsule 0.25 mcg  0.25 mcg Oral At Bedtime Chai España MD   0.25 mcg at 10/25/24 2203    carvedilol (COREG) tablet 6.25 mg  6.25 mg Oral At Bedtime Chai España MD   6.25 mg at 10/25/24 2202    cinacalcet (SENSIPAR) tablet 60 mg  60 mg Oral Once per day on Monday Wednesday Friday Chai España MD   60 mg at 10/23/24 0932    insulin aspart (NovoLOG) injection (RAPID ACTING)  1-7 Units Subcutaneous TID AC Chai España MD   4 Units at 10/26/24 0906    insulin aspart (NovoLOG) injection (RAPID ACTING)  1-5 Units Subcutaneous At Bedtime Chai España MD   1 Units at 10/24/24 2129    pantoprazole (PROTONIX) EC tablet 40 mg  40 mg Oral BID Chai España MD   40 mg at 10/26/24 0820    polyethylene glycol (MIRALAX) Packet 17 g  17 g  Oral Daily Benjie Collins MD   17 g at 10/26/24 0820    sacubitril-valsartan (ENTRESTO) 49-51 MG per tablet 1 tablet  1 tablet Oral BID Benjie Collins MD   1 tablet at 10/26/24 0820    senna-docusate (SENOKOT-S/PERICOLACE) 8.6-50 MG per tablet 1 tablet  1 tablet Oral BID Cecilia Stearns NP   1 tablet at 10/25/24 2203    sevelamer carbonate (RENVELA) tablet 800 mg  800 mg Oral TID w/meals Chai España MD   800 mg at 10/26/24 0820    sodium chloride (PF) 0.9% PF flush 3 mL  3 mL Intracatheter Q8H Chai España MD   3 mL at 10/24/24 2128       Data   BMP  Recent Labs   Lab 10/26/24  0645 10/26/24  0547 10/26/24  0159 10/25/24  2126 10/25/24  1148 10/25/24  0608 10/24/24  0729 10/24/24  0644 10/23/24  0951 10/23/24  0637     --   --   --   --  136  --  138  --  135   POTASSIUM 4.4  --   --   --   --  3.9  --  4.0  --  3.8   CHLORIDE 97*  --   --   --   --  99  --  98  --  97*   TERENCE 9.1  --   --   --   --  8.6*  --  8.1*  --  8.5*   CO2 24  --   --   --   --  27  --  28  --  27   BUN 41.7*  --   --   --   --  40.0*  --  40.5*  --  41.7*   CR 7.86*  --   --   --   --  8.12*  --  8.16*  --  8.13*   * 291* 273* 157*   < > 163*   < > 204*   < > 218*    < > = values in this interval not displayed.     [email or web address](phos:4)  CBC)  Recent Labs   Lab 10/26/24  0645 10/25/24  2225 10/25/24  1741 10/23/24  0637 10/20/24  0839   WBC 10.4  --   --  6.5 6.3   HGB 7.4*  --  8.9* 10.3* 10.4*   HCT 21.9*  --   --  30.9* 31.2*   *  --   --  100 102*    250  --  264 274       Recent Labs   Lab 10/26/24  0645   INR 1.28*         Attestation:   I have reviewed today's relevant vital signs, notes, medications, labs and imaging.

## 2024-10-26 NOTE — PLAN OF CARE
Goal Outcome Evaluation:      Plan of Care Reviewed With: patient    Overall Patient Progress: no changeOverall Patient Progress: no change     Date & Time: 10/25/24 7288-3504  Surgery/POD#: POD1 LEFT FEMORAL ENDARTERECTOMY, LEFT FEMORAL TO TIBIAL PERONEAL TRUNK BYPASS WITH LEFT GREAT SEPHANEOUS VEIN, WOUND VAC PLACEMENT ON LEFT GROIN   Behavior & Aggression: Green  Fall Risk: yes  Orientation: A&Ox4  ABNL VS/O2:VSS on RA ex tachy, soft BP at times  ABNL Labs: BG , 291  Pain Management: scheduled tylenol.   Bowel/Bladder: borrego, no BM  Drains: Borrego, wound vac  Wounds/incisions incision to groin, induration noted (MD aware) incision down LLE, would to L great toe. PD access to LL abdomen  Diet: Clears Advance as tolerate  Activity Level:  not OOB post op, pt refused   Tests/Procedures:    Anticipated  DC Date: pending improvement.   Significant Information: pt on Peritoneal dialysis. PD running overnight. CMS intact with baseline numbness in BLE. Doppler pulses to lower extremities.

## 2024-10-26 NOTE — PROGRESS NOTES
Podiatry / Foot and Ankle Surgery Progress Note    October 26, 2024    Subject: Patient was seen at bedside.  Notes that overall he is doing well.  His leg feels okay.    Objective:  Vitals: /77 (BP Location: Left arm)   Pulse 106   Temp 98.2  F (36.8  C) (Axillary)   Resp 18   Wt 82.3 kg (181 lb 7 oz)   SpO2 94%   BMI 24.85 kg/m    BMI= Body mass index is 24.85 kg/m .    A1C: 5.7 (10/16/2024)    General:  Patient is alert and orientated.  NAD.    Vascular:  DP and PT pulses are faintly palpable.  No edema or varicosities noted.  CFT's < 3secs.  Skin temp is normal.    Neuro:  Light and gross touch sensation intact to digits, dorsum, and plantar aspects of the feet.    Derm: Stable eschar to the distal half of the left great toe.  No streaking redness, purulent drainage or signs of acute infection noted.  Some purple mottling noted to the left foot diffusely.    Musculoskeletal:  No foot deformity noted.      Imaging: mri left foot - I personally reviewed the xrays.   Distal phalanx of the first digit appears pointed distally with  replacement of normal fatty marrow and a soft tissue defect at the  distal tuft. About 14 mm of the distal phalanx appear normal with  normal fat signal and without significant edema. Constellation of  findings favors gangrene.  2. No other osseous abnormalities to suggest osteomyelitis.  3. Degenerative changes of the interphalangeal joint and the  metatarsophalangeal joint of the first digit.  4. Widespread edema throughout the musculature as can be seen in  microvascular disease associated with diabetes.    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    Medical Decision Making/Plan:    -Discussed with patient that now that he has had his bypass that we would proceed with partial toe removal to remove the eschar to try to prevent infection.    -Discussed he will be  minimal weightbearing in a boot after surgery.    -Patient agrees and we will try to schedule him for tomorrow.    -Spoke with patient's son.    -Patient will be n.p.o. after midnight tonight (orders placed). Surgery for 7:30am    Haley Joseph DPM, Podiatry/Foot and Ankle Surgery

## 2024-10-26 NOTE — ANESTHESIA POSTPROCEDURE EVALUATION
Patient: Arnulfo Pritchett    Procedure: Procedure(s):  LEFT FEMORAL ENDARTERECTOMY, LEFT FEMORAL TO TIBIAL PERONEAL TRUNK BYPASS WITH LEFT GREAT SEPHANEOUS VEIN, WOUND VAC PLACEMENT ON LEFT GROIN.       Anesthesia Type:  General    Note:  Disposition: Inpatient   Postop Pain Control: Uneventful            Sign Out: Well controlled pain   PONV: No   Neuro/Psych: Uneventful            Sign Out: Acceptable/Baseline neuro status   Airway/Respiratory: Uneventful            Sign Out: Acceptable/Baseline resp. status   CV/Hemodynamics: Uneventful            Sign Out: Acceptable CV status   Other NRE: NONE   DID A NON-ROUTINE EVENT OCCUR? No           Last vitals:  Vitals Value Taken Time   /82 10/25/24 1926   Temp     Pulse 107 10/25/24 1928   Resp 0 10/25/24 1928   SpO2 96 % 10/25/24 1928   Vitals shown include unfiled device data.    Electronically Signed By: Darwin Sullivan MD  October 25, 2024  7:29 PM

## 2024-10-26 NOTE — PROGRESS NOTES
Date & Time: 10/25/24 7264-9748  Surgery/POD#: POD0 LEFT FEMORAL ENDARTERECTOMY, LEFT FEMORAL TO TIBIAL PERONEAL TRUNK BYPASS WITH LEFT GREAT SEPHANEOUS VEIN, WOUND VAC PLACEMENT ON LEFT GROIN   Behavior & Aggression: Green  Fall Risk: yes  Orientation: A&Ox4  ABNL VS/O2:VSS on RA ex tacky  ABNL Labs:   Pain Management: scheduled tylenol.   Bowel/Bladder: borrego, no BM  Drains: Borrego, would vac  Wounds/incisions incision to groin, induration noted (MD aware) incision down LLE, would to L great toe. PD access to LL abdomen  Diet: Clears Advance as tolerate  Activity Level:  not OOB post op.   Tests/Procedures:    Anticipated  DC Date: pending improvement.   Significant Information: pt on Peritoneal dialysis. Writer assisted in setting up overnight dialysis. CMS intact with baseline numbness in BLE. Doppler pulses to lower extremities.

## 2024-10-27 ENCOUNTER — ANESTHESIA (OUTPATIENT)
Dept: SURGERY | Facility: CLINIC | Age: 64
End: 2024-10-27
Payer: MEDICARE

## 2024-10-27 ENCOUNTER — ANESTHESIA EVENT (OUTPATIENT)
Dept: SURGERY | Facility: CLINIC | Age: 64
End: 2024-10-27
Payer: MEDICARE

## 2024-10-27 ENCOUNTER — APPOINTMENT (OUTPATIENT)
Dept: GENERAL RADIOLOGY | Facility: CLINIC | Age: 64
DRG: 252 | End: 2024-10-27
Attending: INTERNAL MEDICINE
Payer: MEDICARE

## 2024-10-27 ENCOUNTER — APPOINTMENT (OUTPATIENT)
Dept: ULTRASOUND IMAGING | Facility: CLINIC | Age: 64
DRG: 252 | End: 2024-10-27
Attending: STUDENT IN AN ORGANIZED HEALTH CARE EDUCATION/TRAINING PROGRAM
Payer: MEDICARE

## 2024-10-27 ENCOUNTER — APPOINTMENT (OUTPATIENT)
Dept: GENERAL RADIOLOGY | Facility: CLINIC | Age: 64
DRG: 252 | End: 2024-10-27
Attending: PODIATRIST
Payer: MEDICARE

## 2024-10-27 LAB
ALBUMIN SERPL BCG-MCNC: 2.6 G/DL (ref 3.5–5.2)
ANION GAP SERPL CALCULATED.3IONS-SCNC: 11 MMOL/L (ref 7–15)
BUN SERPL-MCNC: 43.1 MG/DL (ref 8–23)
CALCIUM SERPL-MCNC: 9.2 MG/DL (ref 8.8–10.4)
CHLORIDE SERPL-SCNC: 94 MMOL/L (ref 98–107)
CREAT SERPL-MCNC: 8.17 MG/DL (ref 0.67–1.17)
EGFRCR SERPLBLD CKD-EPI 2021: 7 ML/MIN/1.73M2
GLUCOSE BLDC GLUCOMTR-MCNC: 162 MG/DL (ref 70–99)
GLUCOSE BLDC GLUCOMTR-MCNC: 172 MG/DL (ref 70–99)
GLUCOSE BLDC GLUCOMTR-MCNC: 177 MG/DL (ref 70–99)
GLUCOSE BLDC GLUCOMTR-MCNC: 247 MG/DL (ref 70–99)
GLUCOSE BLDC GLUCOMTR-MCNC: 265 MG/DL (ref 70–99)
GLUCOSE BLDC GLUCOMTR-MCNC: 276 MG/DL (ref 70–99)
GLUCOSE BLDC GLUCOMTR-MCNC: 293 MG/DL (ref 70–99)
GLUCOSE SERPL-MCNC: 361 MG/DL (ref 70–99)
HCO3 SERPL-SCNC: 28 MMOL/L (ref 22–29)
PHOSPHATE SERPL-MCNC: 5.4 MG/DL (ref 2.5–4.5)
POTASSIUM SERPL-SCNC: 4.6 MMOL/L (ref 3.4–5.3)
SODIUM SERPL-SCNC: 133 MMOL/L (ref 135–145)
UFH PPP CHRO-ACNC: 0.25 IU/ML

## 2024-10-27 PROCEDURE — 999N000065 XR FOOT PORT LEFT 3 VIEWS: Mod: LT

## 2024-10-27 PROCEDURE — 28825 PARTIAL AMPUTATION OF TOE: CPT | Performed by: NURSE ANESTHETIST, CERTIFIED REGISTERED

## 2024-10-27 PROCEDURE — 250N000011 HC RX IP 250 OP 636: Mod: JW | Performed by: PODIATRIST

## 2024-10-27 PROCEDURE — 250N000011 HC RX IP 250 OP 636: Performed by: SURGERY

## 2024-10-27 PROCEDURE — 28825 PARTIAL AMPUTATION OF TOE: CPT | Performed by: ANESTHESIOLOGY

## 2024-10-27 PROCEDURE — 250N000011 HC RX IP 250 OP 636: Performed by: PODIATRIST

## 2024-10-27 PROCEDURE — 258N000003 HC RX IP 258 OP 636: Performed by: SURGERY

## 2024-10-27 PROCEDURE — 250N000013 HC RX MED GY IP 250 OP 250 PS 637: Performed by: PODIATRIST

## 2024-10-27 PROCEDURE — 120N000001 HC R&B MED SURG/OB

## 2024-10-27 PROCEDURE — 36620 INSERTION CATHETER ARTERY: CPT | Mod: 59 | Performed by: ANESTHESIOLOGY

## 2024-10-27 PROCEDURE — 36415 COLL VENOUS BLD VENIPUNCTURE: CPT | Performed by: STUDENT IN AN ORGANIZED HEALTH CARE EDUCATION/TRAINING PROGRAM

## 2024-10-27 PROCEDURE — 93926 LOWER EXTREMITY STUDY: CPT | Mod: LT

## 2024-10-27 PROCEDURE — 75710 ARTERY X-RAYS ARM/LEG: CPT | Mod: 26 | Performed by: SURGERY

## 2024-10-27 PROCEDURE — 250N000011 HC RX IP 250 OP 636: Performed by: ANESTHESIOLOGY

## 2024-10-27 PROCEDURE — 360N000082 HC SURGERY LEVEL 2 W/ FLUORO, PER MIN: Performed by: PODIATRIST

## 2024-10-27 PROCEDURE — 0Y6Q0Z3 DETACHMENT AT LEFT 1ST TOE, LOW, OPEN APPROACH: ICD-10-PCS | Performed by: PODIATRIST

## 2024-10-27 PROCEDURE — 250N000009 HC RX 250: Performed by: ANESTHESIOLOGY

## 2024-10-27 PROCEDURE — 250N000009 HC RX 250: Performed by: SURGERY

## 2024-10-27 PROCEDURE — 84132 ASSAY OF SERUM POTASSIUM: CPT | Performed by: STUDENT IN AN ORGANIZED HEALTH CARE EDUCATION/TRAINING PROGRAM

## 2024-10-27 PROCEDURE — 90945 DIALYSIS ONE EVALUATION: CPT

## 2024-10-27 PROCEDURE — 99232 SBSQ HOSP IP/OBS MODERATE 35: CPT | Performed by: INTERNAL MEDICINE

## 2024-10-27 PROCEDURE — 999N000179 XR SURGERY CARM FLUORO LESS THAN 5 MIN W STILLS

## 2024-10-27 PROCEDURE — 36140 INTRO NDL ICATH UPR/LXTR ART: CPT | Mod: 78 | Performed by: SURGERY

## 2024-10-27 PROCEDURE — 28825 PARTIAL AMPUTATION OF TOE: CPT

## 2024-10-27 PROCEDURE — 82947 ASSAY GLUCOSE BLOOD QUANT: CPT | Performed by: STUDENT IN AN ORGANIZED HEALTH CARE EDUCATION/TRAINING PROGRAM

## 2024-10-27 PROCEDURE — 85520 HEPARIN ASSAY: CPT | Performed by: INTERNAL MEDICINE

## 2024-10-27 PROCEDURE — P9045 ALBUMIN (HUMAN), 5%, 250 ML: HCPCS | Performed by: ANESTHESIOLOGY

## 2024-10-27 PROCEDURE — 250N000009 HC RX 250: Performed by: PODIATRIST

## 2024-10-27 PROCEDURE — 710N000009 HC RECOVERY PHASE 1, LEVEL 1, PER MIN: Performed by: PODIATRIST

## 2024-10-27 PROCEDURE — C1894 INTRO/SHEATH, NON-LASER: HCPCS | Performed by: SURGERY

## 2024-10-27 PROCEDURE — 250N000025 HC SEVOFLURANE, PER MIN: Performed by: SURGERY

## 2024-10-27 PROCEDURE — 250N000013 HC RX MED GY IP 250 OP 250 PS 637

## 2024-10-27 PROCEDURE — 250N000011 HC RX IP 250 OP 636: Performed by: NURSE ANESTHETIST, CERTIFIED REGISTERED

## 2024-10-27 PROCEDURE — 370N000017 HC ANESTHESIA TECHNICAL FEE, PER MIN: Performed by: PODIATRIST

## 2024-10-27 PROCEDURE — 250N000009 HC RX 250

## 2024-10-27 PROCEDURE — 88305 TISSUE EXAM BY PATHOLOGIST: CPT | Mod: TC | Performed by: PODIATRIST

## 2024-10-27 PROCEDURE — 272N000001 HC OR GENERAL SUPPLY STERILE: Performed by: PODIATRIST

## 2024-10-27 PROCEDURE — 06LQ0ZZ OCCLUSION OF LEFT SAPHENOUS VEIN, OPEN APPROACH: ICD-10-PCS | Performed by: SURGERY

## 2024-10-27 PROCEDURE — 90945 DIALYSIS ONE EVALUATION: CPT | Performed by: INTERNAL MEDICINE

## 2024-10-27 PROCEDURE — 999N000141 HC STATISTIC PRE-PROCEDURE NURSING ASSESSMENT: Performed by: PODIATRIST

## 2024-10-27 PROCEDURE — 272N000001 HC OR GENERAL SUPPLY STERILE: Performed by: SURGERY

## 2024-10-27 PROCEDURE — 250N000011 HC RX IP 250 OP 636

## 2024-10-27 PROCEDURE — 258N000003 HC RX IP 258 OP 636: Performed by: NURSE ANESTHETIST, CERTIFIED REGISTERED

## 2024-10-27 PROCEDURE — 258N000003 HC RX IP 258 OP 636: Performed by: ANESTHESIOLOGY

## 2024-10-27 PROCEDURE — 370N000017 HC ANESTHESIA TECHNICAL FEE, PER MIN: Performed by: SURGERY

## 2024-10-27 PROCEDURE — 28825 PARTIAL AMPUTATION OF TOE: CPT | Mod: TA | Performed by: PODIATRIST

## 2024-10-27 PROCEDURE — 35879 REVISE GRAFT W/VEIN: CPT | Mod: 78 | Performed by: SURGERY

## 2024-10-27 PROCEDURE — 82040 ASSAY OF SERUM ALBUMIN: CPT | Performed by: STUDENT IN AN ORGANIZED HEALTH CARE EDUCATION/TRAINING PROGRAM

## 2024-10-27 RX ORDER — SODIUM CHLORIDE, SODIUM LACTATE, POTASSIUM CHLORIDE, CALCIUM CHLORIDE 600; 310; 30; 20 MG/100ML; MG/100ML; MG/100ML; MG/100ML
INJECTION, SOLUTION INTRAVENOUS CONTINUOUS PRN
Status: DISCONTINUED | OUTPATIENT
Start: 2024-10-27 | End: 2024-10-27

## 2024-10-27 RX ORDER — HYDROMORPHONE HCL IN WATER/PF 6 MG/30 ML
0.4 PATIENT CONTROLLED ANALGESIA SYRINGE INTRAVENOUS EVERY 5 MIN PRN
Status: DISCONTINUED | OUTPATIENT
Start: 2024-10-27 | End: 2024-10-27 | Stop reason: HOSPADM

## 2024-10-27 RX ORDER — LIDOCAINE HYDROCHLORIDE 10 MG/ML
INJECTION, SOLUTION INFILTRATION; PERINEURAL
Status: COMPLETED | OUTPATIENT
Start: 2024-10-27 | End: 2024-10-27

## 2024-10-27 RX ORDER — FENTANYL CITRATE 50 UG/ML
50 INJECTION, SOLUTION INTRAMUSCULAR; INTRAVENOUS ONCE
Status: COMPLETED | OUTPATIENT
Start: 2024-10-27 | End: 2024-10-27

## 2024-10-27 RX ORDER — PROPOFOL 10 MG/ML
INJECTION, EMULSION INTRAVENOUS PRN
Status: DISCONTINUED | OUTPATIENT
Start: 2024-10-27 | End: 2024-10-27

## 2024-10-27 RX ORDER — MAGNESIUM HYDROXIDE 1200 MG/15ML
LIQUID ORAL PRN
Status: DISCONTINUED | OUTPATIENT
Start: 2024-10-27 | End: 2024-10-27 | Stop reason: HOSPADM

## 2024-10-27 RX ORDER — ACETAMINOPHEN 325 MG/1
975 TABLET ORAL EVERY 8 HOURS
Status: COMPLETED | OUTPATIENT
Start: 2024-10-27 | End: 2024-10-30

## 2024-10-27 RX ORDER — ONDANSETRON 2 MG/ML
4 INJECTION INTRAMUSCULAR; INTRAVENOUS EVERY 30 MIN PRN
Status: DISCONTINUED | OUTPATIENT
Start: 2024-10-27 | End: 2024-10-27 | Stop reason: HOSPADM

## 2024-10-27 RX ORDER — FENTANYL CITRATE 50 UG/ML
INJECTION, SOLUTION INTRAMUSCULAR; INTRAVENOUS PRN
Status: DISCONTINUED | OUTPATIENT
Start: 2024-10-27 | End: 2024-10-27

## 2024-10-27 RX ORDER — ACETAMINOPHEN 325 MG/1
650 TABLET ORAL EVERY 4 HOURS PRN
Status: DISCONTINUED | OUTPATIENT
Start: 2024-10-30 | End: 2024-11-09 | Stop reason: HOSPADM

## 2024-10-27 RX ORDER — PROPOFOL 10 MG/ML
INJECTION, EMULSION INTRAVENOUS CONTINUOUS PRN
Status: DISCONTINUED | OUTPATIENT
Start: 2024-10-27 | End: 2024-10-27

## 2024-10-27 RX ORDER — HYDROMORPHONE HCL IN WATER/PF 6 MG/30 ML
0.2 PATIENT CONTROLLED ANALGESIA SYRINGE INTRAVENOUS EVERY 5 MIN PRN
Status: DISCONTINUED | OUTPATIENT
Start: 2024-10-27 | End: 2024-10-27

## 2024-10-27 RX ORDER — LABETALOL HYDROCHLORIDE 5 MG/ML
10 INJECTION, SOLUTION INTRAVENOUS
Status: DISCONTINUED | OUTPATIENT
Start: 2024-10-27 | End: 2024-10-27

## 2024-10-27 RX ORDER — HEPARIN SODIUM 1000 [USP'U]/ML
INJECTION, SOLUTION INTRAVENOUS; SUBCUTANEOUS PRN
Status: DISCONTINUED | OUTPATIENT
Start: 2024-10-27 | End: 2024-10-27 | Stop reason: HOSPADM

## 2024-10-27 RX ORDER — ONDANSETRON 2 MG/ML
4 INJECTION INTRAMUSCULAR; INTRAVENOUS EVERY 6 HOURS PRN
Status: DISCONTINUED | OUTPATIENT
Start: 2024-10-27 | End: 2024-11-09 | Stop reason: HOSPADM

## 2024-10-27 RX ORDER — CEFAZOLIN SODIUM/WATER 2 G/20 ML
2 SYRINGE (ML) INTRAVENOUS SEE ADMIN INSTRUCTIONS
Status: DISCONTINUED | OUTPATIENT
Start: 2024-10-27 | End: 2024-10-27 | Stop reason: HOSPADM

## 2024-10-27 RX ORDER — ONDANSETRON 4 MG/1
4 TABLET, ORALLY DISINTEGRATING ORAL EVERY 30 MIN PRN
Status: DISCONTINUED | OUTPATIENT
Start: 2024-10-27 | End: 2024-10-27

## 2024-10-27 RX ORDER — BISACODYL 10 MG
10 SUPPOSITORY, RECTAL RECTAL DAILY PRN
Status: DISCONTINUED | OUTPATIENT
Start: 2024-10-30 | End: 2024-11-09 | Stop reason: HOSPADM

## 2024-10-27 RX ORDER — CALCIUM CHLORIDE 100 MG/ML
INJECTION INTRAVENOUS; INTRAVENTRICULAR PRN
Status: DISCONTINUED | OUTPATIENT
Start: 2024-10-27 | End: 2024-10-27

## 2024-10-27 RX ORDER — FENTANYL CITRATE 0.05 MG/ML
25 INJECTION, SOLUTION INTRAMUSCULAR; INTRAVENOUS EVERY 5 MIN PRN
Status: DISCONTINUED | OUTPATIENT
Start: 2024-10-27 | End: 2024-10-27 | Stop reason: HOSPADM

## 2024-10-27 RX ORDER — ONDANSETRON 2 MG/ML
4 INJECTION INTRAMUSCULAR; INTRAVENOUS EVERY 30 MIN PRN
Status: DISCONTINUED | OUTPATIENT
Start: 2024-10-27 | End: 2024-10-27

## 2024-10-27 RX ORDER — VASOPRESSIN IN 0.9 % NACL 2 UNIT/2ML
SYRINGE (ML) INTRAVENOUS PRN
Status: DISCONTINUED | OUTPATIENT
Start: 2024-10-27 | End: 2024-10-27

## 2024-10-27 RX ORDER — NALOXONE HYDROCHLORIDE 0.4 MG/ML
0.1 INJECTION, SOLUTION INTRAMUSCULAR; INTRAVENOUS; SUBCUTANEOUS
Status: DISCONTINUED | OUTPATIENT
Start: 2024-10-27 | End: 2024-10-27

## 2024-10-27 RX ORDER — CEFAZOLIN SODIUM/WATER 2 G/20 ML
2 SYRINGE (ML) INTRAVENOUS
Status: COMPLETED | OUTPATIENT
Start: 2024-10-27 | End: 2024-10-27

## 2024-10-27 RX ORDER — FENTANYL CITRATE 0.05 MG/ML
50 INJECTION, SOLUTION INTRAMUSCULAR; INTRAVENOUS EVERY 5 MIN PRN
Status: DISCONTINUED | OUTPATIENT
Start: 2024-10-27 | End: 2024-10-27

## 2024-10-27 RX ORDER — OXYCODONE HYDROCHLORIDE 5 MG/1
5 TABLET ORAL EVERY 4 HOURS PRN
Status: DISCONTINUED | OUTPATIENT
Start: 2024-10-27 | End: 2024-11-09 | Stop reason: HOSPADM

## 2024-10-27 RX ORDER — DEXAMETHASONE SODIUM PHOSPHATE 4 MG/ML
4 INJECTION, SOLUTION INTRA-ARTICULAR; INTRALESIONAL; INTRAMUSCULAR; INTRAVENOUS; SOFT TISSUE
Status: DISCONTINUED | OUTPATIENT
Start: 2024-10-27 | End: 2024-10-27 | Stop reason: HOSPADM

## 2024-10-27 RX ORDER — ONDANSETRON 2 MG/ML
INJECTION INTRAMUSCULAR; INTRAVENOUS PRN
Status: DISCONTINUED | OUTPATIENT
Start: 2024-10-27 | End: 2024-10-27

## 2024-10-27 RX ORDER — LIDOCAINE 40 MG/G
CREAM TOPICAL
Status: DISCONTINUED | OUTPATIENT
Start: 2024-10-27 | End: 2024-11-09 | Stop reason: HOSPADM

## 2024-10-27 RX ORDER — DEXAMETHASONE SODIUM PHOSPHATE 4 MG/ML
4 INJECTION, SOLUTION INTRA-ARTICULAR; INTRALESIONAL; INTRAMUSCULAR; INTRAVENOUS; SOFT TISSUE
Status: DISCONTINUED | OUTPATIENT
Start: 2024-10-27 | End: 2024-10-27

## 2024-10-27 RX ORDER — LIDOCAINE HYDROCHLORIDE 20 MG/ML
INJECTION, SOLUTION INFILTRATION; PERINEURAL PRN
Status: DISCONTINUED | OUTPATIENT
Start: 2024-10-27 | End: 2024-10-27

## 2024-10-27 RX ORDER — SODIUM CHLORIDE, SODIUM LACTATE, POTASSIUM CHLORIDE, CALCIUM CHLORIDE 600; 310; 30; 20 MG/100ML; MG/100ML; MG/100ML; MG/100ML
INJECTION, SOLUTION INTRAVENOUS CONTINUOUS
Status: DISCONTINUED | OUTPATIENT
Start: 2024-10-27 | End: 2024-10-27

## 2024-10-27 RX ORDER — ONDANSETRON 4 MG/1
4 TABLET, ORALLY DISINTEGRATING ORAL EVERY 6 HOURS PRN
Status: DISCONTINUED | OUTPATIENT
Start: 2024-10-27 | End: 2024-11-09 | Stop reason: HOSPADM

## 2024-10-27 RX ORDER — FENTANYL CITRATE 0.05 MG/ML
50 INJECTION, SOLUTION INTRAMUSCULAR; INTRAVENOUS EVERY 5 MIN PRN
Status: DISCONTINUED | OUTPATIENT
Start: 2024-10-27 | End: 2024-10-27 | Stop reason: HOSPADM

## 2024-10-27 RX ORDER — HYDROMORPHONE HCL IN WATER/PF 6 MG/30 ML
0.4 PATIENT CONTROLLED ANALGESIA SYRINGE INTRAVENOUS EVERY 5 MIN PRN
Status: DISCONTINUED | OUTPATIENT
Start: 2024-10-27 | End: 2024-10-27

## 2024-10-27 RX ORDER — HYDROMORPHONE HCL IN WATER/PF 6 MG/30 ML
0.2 PATIENT CONTROLLED ANALGESIA SYRINGE INTRAVENOUS EVERY 5 MIN PRN
Status: DISCONTINUED | OUTPATIENT
Start: 2024-10-27 | End: 2024-10-27 | Stop reason: HOSPADM

## 2024-10-27 RX ORDER — LABETALOL HYDROCHLORIDE 5 MG/ML
10 INJECTION, SOLUTION INTRAVENOUS
Status: DISCONTINUED | OUTPATIENT
Start: 2024-10-27 | End: 2024-10-27 | Stop reason: HOSPADM

## 2024-10-27 RX ORDER — ONDANSETRON 4 MG/1
4 TABLET, ORALLY DISINTEGRATING ORAL EVERY 30 MIN PRN
Status: DISCONTINUED | OUTPATIENT
Start: 2024-10-27 | End: 2024-10-27 | Stop reason: HOSPADM

## 2024-10-27 RX ORDER — NALOXONE HYDROCHLORIDE 0.4 MG/ML
0.1 INJECTION, SOLUTION INTRAMUSCULAR; INTRAVENOUS; SUBCUTANEOUS
Status: DISCONTINUED | OUTPATIENT
Start: 2024-10-27 | End: 2024-10-27 | Stop reason: HOSPADM

## 2024-10-27 RX ORDER — IOPAMIDOL 755 MG/ML
INJECTION, SOLUTION INTRAVASCULAR PRN
Status: DISCONTINUED | OUTPATIENT
Start: 2024-10-27 | End: 2024-10-27 | Stop reason: HOSPADM

## 2024-10-27 RX ORDER — BUPIVACAINE HYDROCHLORIDE 5 MG/ML
INJECTION, SOLUTION EPIDURAL; INTRACAUDAL PRN
Status: DISCONTINUED | OUTPATIENT
Start: 2024-10-27 | End: 2024-10-27 | Stop reason: HOSPADM

## 2024-10-27 RX ORDER — POLYETHYLENE GLYCOL 3350 17 G/17G
17 POWDER, FOR SOLUTION ORAL DAILY
Status: DISCONTINUED | OUTPATIENT
Start: 2024-10-28 | End: 2024-11-09 | Stop reason: HOSPADM

## 2024-10-27 RX ORDER — HEPARIN SODIUM 1000 [USP'U]/ML
INJECTION, SOLUTION INTRAVENOUS; SUBCUTANEOUS PRN
Status: DISCONTINUED | OUTPATIENT
Start: 2024-10-27 | End: 2024-10-27

## 2024-10-27 RX ORDER — AMOXICILLIN 250 MG
1 CAPSULE ORAL 2 TIMES DAILY
Status: DISCONTINUED | OUTPATIENT
Start: 2024-10-27 | End: 2024-10-29

## 2024-10-27 RX ORDER — FENTANYL CITRATE 0.05 MG/ML
25 INJECTION, SOLUTION INTRAMUSCULAR; INTRAVENOUS EVERY 5 MIN PRN
Status: DISCONTINUED | OUTPATIENT
Start: 2024-10-27 | End: 2024-10-27

## 2024-10-27 RX ORDER — PROCHLORPERAZINE MALEATE 5 MG/1
5 TABLET ORAL EVERY 6 HOURS PRN
Status: DISCONTINUED | OUTPATIENT
Start: 2024-10-27 | End: 2024-11-09 | Stop reason: HOSPADM

## 2024-10-27 RX ORDER — DEXAMETHASONE SODIUM PHOSPHATE 4 MG/ML
INJECTION, SOLUTION INTRA-ARTICULAR; INTRALESIONAL; INTRAMUSCULAR; INTRAVENOUS; SOFT TISSUE PRN
Status: DISCONTINUED | OUTPATIENT
Start: 2024-10-27 | End: 2024-10-27

## 2024-10-27 RX ADMIN — SACUBITRIL AND VALSARTAN 1 TABLET: 49; 51 TABLET, FILM COATED ORAL at 20:32

## 2024-10-27 RX ADMIN — CALCITRIOL CAPSULES 0.25 MCG 0.25 MCG: 0.25 CAPSULE ORAL at 22:02

## 2024-10-27 RX ADMIN — SENNOSIDES AND DOCUSATE SODIUM 1 TABLET: 50; 8.6 TABLET ORAL at 11:16

## 2024-10-27 RX ADMIN — ASPIRIN 81 MG: 81 TABLET, COATED ORAL at 10:45

## 2024-10-27 RX ADMIN — PROPOFOL 140 MCG/KG/MIN: 10 INJECTION, EMULSION INTRAVENOUS at 07:36

## 2024-10-27 RX ADMIN — INSULIN ASPART 2 UNITS: 100 INJECTION, SOLUTION INTRAVENOUS; SUBCUTANEOUS at 22:03

## 2024-10-27 RX ADMIN — LIDOCAINE HYDROCHLORIDE 2 ML: 10 INJECTION, SOLUTION INFILTRATION; PERINEURAL at 12:50

## 2024-10-27 RX ADMIN — ONDANSETRON 4 MG: 2 INJECTION INTRAMUSCULAR; INTRAVENOUS at 15:11

## 2024-10-27 RX ADMIN — SEVELAMER CARBONATE 800 MG: 800 TABLET, FILM COATED ORAL at 17:20

## 2024-10-27 RX ADMIN — DEXAMETHASONE SODIUM PHOSPHATE 4 MG: 4 INJECTION, SOLUTION INTRA-ARTICULAR; INTRALESIONAL; INTRAMUSCULAR; INTRAVENOUS; SOFT TISSUE at 13:48

## 2024-10-27 RX ADMIN — NOREPINEPHRINE BITARTRATE 6.4 MCG: 1 INJECTION, SOLUTION, CONCENTRATE INTRAVENOUS at 13:58

## 2024-10-27 RX ADMIN — FENTANYL CITRATE 50 MCG: 50 INJECTION, SOLUTION INTRAMUSCULAR; INTRAVENOUS at 12:45

## 2024-10-27 RX ADMIN — PHENYLEPHRINE HYDROCHLORIDE 100 MCG: 10 INJECTION INTRAVENOUS at 13:47

## 2024-10-27 RX ADMIN — PROPOFOL 100 MG: 10 INJECTION, EMULSION INTRAVENOUS at 13:43

## 2024-10-27 RX ADMIN — PHENYLEPHRINE HYDROCHLORIDE 200 MCG: 10 INJECTION INTRAVENOUS at 14:30

## 2024-10-27 RX ADMIN — SODIUM CHLORIDE, POTASSIUM CHLORIDE, SODIUM LACTATE AND CALCIUM CHLORIDE: 600; 310; 30; 20 INJECTION, SOLUTION INTRAVENOUS at 13:23

## 2024-10-27 RX ADMIN — SODIUM CHLORIDE, SODIUM LACTATE, POTASSIUM CHLORIDE, CALCIUM CHLORIDE: 600; 310; 30; 20 INJECTION, SOLUTION INTRAVENOUS at 13:46

## 2024-10-27 RX ADMIN — Medication 2 G: at 07:34

## 2024-10-27 RX ADMIN — SODIUM CHLORIDE, POTASSIUM CHLORIDE, SODIUM LACTATE AND CALCIUM CHLORIDE: 600; 310; 30; 20 INJECTION, SOLUTION INTRAVENOUS at 07:29

## 2024-10-27 RX ADMIN — ALLOPURINOL 200 MG: 100 TABLET ORAL at 20:32

## 2024-10-27 RX ADMIN — OXYCODONE HYDROCHLORIDE 5 MG: 5 TABLET ORAL at 00:12

## 2024-10-27 RX ADMIN — SENNOSIDES AND DOCUSATE SODIUM 1 TABLET: 50; 8.6 TABLET ORAL at 20:32

## 2024-10-27 RX ADMIN — FENTANYL CITRATE 50 MCG: 50 INJECTION INTRAMUSCULAR; INTRAVENOUS at 13:43

## 2024-10-27 RX ADMIN — PHENYLEPHRINE HYDROCHLORIDE 100 MCG: 10 INJECTION INTRAVENOUS at 07:57

## 2024-10-27 RX ADMIN — PHENYLEPHRINE HYDROCHLORIDE 200 MCG: 10 INJECTION INTRAVENOUS at 07:49

## 2024-10-27 RX ADMIN — PANTOPRAZOLE SODIUM 40 MG: 40 TABLET, DELAYED RELEASE ORAL at 10:45

## 2024-10-27 RX ADMIN — INSULIN ASPART 1 UNITS: 100 INJECTION, SOLUTION INTRAVENOUS; SUBCUTANEOUS at 17:20

## 2024-10-27 RX ADMIN — ATORVASTATIN CALCIUM 40 MG: 40 TABLET, FILM COATED ORAL at 22:00

## 2024-10-27 RX ADMIN — ALBUMIN (HUMAN): 12.5 SOLUTION INTRAVENOUS at 13:55

## 2024-10-27 RX ADMIN — AMOXICILLIN AND CLAVULANATE POTASSIUM 1 TABLET: 500; 125 TABLET, FILM COATED ORAL at 10:46

## 2024-10-27 RX ADMIN — SEVELAMER CARBONATE 800 MG: 800 TABLET, FILM COATED ORAL at 11:17

## 2024-10-27 RX ADMIN — FENTANYL CITRATE 50 MCG: 50 INJECTION INTRAMUSCULAR; INTRAVENOUS at 14:08

## 2024-10-27 RX ADMIN — CALCIUM CHLORIDE INJECTION 0.5 G: 100 INJECTION, SOLUTION INTRAVENOUS at 13:59

## 2024-10-27 RX ADMIN — Medication 1 UNITS: at 14:01

## 2024-10-27 RX ADMIN — PHENYLEPHRINE HYDROCHLORIDE 100 MCG: 10 INJECTION INTRAVENOUS at 13:48

## 2024-10-27 RX ADMIN — LIDOCAINE HYDROCHLORIDE 60 MG: 20 INJECTION, SOLUTION INFILTRATION; PERINEURAL at 13:43

## 2024-10-27 RX ADMIN — ACETAMINOPHEN 975 MG: 325 TABLET, FILM COATED ORAL at 05:19

## 2024-10-27 RX ADMIN — NOREPINEPHRINE BITARTRATE 6.4 MCG: 1 INJECTION, SOLUTION, CONCENTRATE INTRAVENOUS at 13:54

## 2024-10-27 RX ADMIN — Medication 2 G: at 13:37

## 2024-10-27 RX ADMIN — Medication 200 MG: at 15:11

## 2024-10-27 RX ADMIN — MIDAZOLAM 2 MG: 1 INJECTION INTRAMUSCULAR; INTRAVENOUS at 07:27

## 2024-10-27 RX ADMIN — PHENYLEPHRINE HYDROCHLORIDE 200 MCG: 10 INJECTION INTRAVENOUS at 07:46

## 2024-10-27 RX ADMIN — SENNOSIDES AND DOCUSATE SODIUM 1 TABLET: 50; 8.6 TABLET ORAL at 10:48

## 2024-10-27 RX ADMIN — PHENYLEPHRINE HYDROCHLORIDE 0.3 MCG/KG/MIN: 10 INJECTION INTRAVENOUS at 13:47

## 2024-10-27 RX ADMIN — ACETAMINOPHEN 975 MG: 325 TABLET, FILM COATED ORAL at 17:20

## 2024-10-27 RX ADMIN — INSULIN ASPART 1 UNITS: 100 INJECTION, SOLUTION INTRAVENOUS; SUBCUTANEOUS at 11:17

## 2024-10-27 RX ADMIN — PROPOFOL 50 MG: 10 INJECTION, EMULSION INTRAVENOUS at 07:33

## 2024-10-27 RX ADMIN — HEPARIN SODIUM 6000 UNITS: 1000 INJECTION, SOLUTION INTRAVENOUS; SUBCUTANEOUS at 14:30

## 2024-10-27 RX ADMIN — ROCURONIUM BROMIDE 50 MG: 50 INJECTION, SOLUTION INTRAVENOUS at 13:43

## 2024-10-27 RX ADMIN — PHENYLEPHRINE HYDROCHLORIDE 100 MCG: 10 INJECTION INTRAVENOUS at 13:44

## 2024-10-27 RX ADMIN — ACETAMINOPHEN 975 MG: 325 TABLET, FILM COATED ORAL at 10:45

## 2024-10-27 RX ADMIN — PHENYLEPHRINE HYDROCHLORIDE 100 MCG: 10 INJECTION INTRAVENOUS at 07:55

## 2024-10-27 RX ADMIN — PANTOPRAZOLE SODIUM 40 MG: 40 TABLET, DELAYED RELEASE ORAL at 20:32

## 2024-10-27 RX ADMIN — OXYCODONE HYDROCHLORIDE 5 MG: 5 TABLET ORAL at 05:19

## 2024-10-27 RX ADMIN — CARVEDILOL 6.25 MG: 6.25 TABLET, FILM COATED ORAL at 21:59

## 2024-10-27 ASSESSMENT — LIFESTYLE VARIABLES: TOBACCO_USE: 1

## 2024-10-27 NOTE — ANESTHESIA POSTPROCEDURE EVALUATION
Patient: Arnulfo Pritchett    Procedure: Procedure(s):  Angiogram Lower Extremity Left  CREATION, BYPASS, VASCULAR, FEMORAL TO TIBIAL REVISION OF BYPASS LEFT COMMON FEMORAL TO TIBIAL.       Anesthesia Type:  General    Note:  Disposition: Inpatient   Postop Pain Control: Uneventful            Sign Out: Well controlled pain   PONV: No   Neuro/Psych: Uneventful            Sign Out: Acceptable/Baseline neuro status   Airway/Respiratory: Uneventful            Sign Out: Acceptable/Baseline resp. status   CV/Hemodynamics: Uneventful            Sign Out: Acceptable CV status; No obvious hypovolemia; No obvious fluid overload   Other NRE: NONE   DID A NON-ROUTINE EVENT OCCUR? No           Last vitals:  Vitals Value Taken Time   /73 10/27/24 1545   Temp     Pulse 108 10/27/24 1552   Resp 17 10/27/24 1552   SpO2 95 % 10/27/24 1552   Vitals shown include unfiled device data.    Electronically Signed By: Ervin Gonzalez DO  October 27, 2024  3:53 PM

## 2024-10-27 NOTE — BRIEF OP NOTE
[facility]    Brief Operative Note    Pre-operative diagnosis: Pain of toe of left foot [M79.675]  Ischemic ulcer of toe, left, with unspecified severity (H) [L97.529]  Type 2 diabetes mellitus with left diabetic foot ulcer (H) [E11.621, L97.529]  Post-operative diagnosis Same as pre-operative diagnosis    Procedure: AMPUTATION, TOE left great toe, Left - Toe    Surgeon: Surgeons and Role:     * Haley Joseph DPM, Podiatry/Foot and Ankle Surgery - Primary  Anesthesia: MAC with Local   Estimated Blood Loss: None    Drains: None  Specimens:   ID Type Source Tests Collected by Time Destination   1 : left great toe Tissue Toe, Left SURGICAL PATHOLOGY EXAM Haley Joseph DPM, Podiatry/Foot and Ankle Surgery 10/27/2024  7:58 AM      Findings:   None.  Complications: None.  Implants: * No implants in log *

## 2024-10-27 NOTE — OP NOTE
Preoperative diagnosis: Loss of signal quality and left common femoral artery to tibioperoneal trunk bypass.    Postoperative diagnosis: Same.    Procedure:  1.  Left lower extremity arteriogram.  2.  Ligation of side branches of the left great saphenous vein.  3.  Temporary closure of right groin with application of wound VAC.    Surgeon: Raul Gray M.D.   Assistant: Lizett Davis M.D.     Anesthesia: General.  Estimated blood loss: About 25 mL.  Complications: None.      Indication for procedure: This is a 64-year-old male who we had done a left common femoral artery to tibioperoneal trunk bypass 2 days ago.  We noticed a distinct loss of signals on a.m. rounds today.  Duplex sonography shows flow reversal in the mid graft with paucity of flow going beyond mid graft.  We also found 2 large sidebranches at that level.  Arteriogram with revision is advised for salvage of the limb and the bypass graft.    Procedure details: Patient was identified and then taken to the operating room and placed in supine position.  Preoperative intravenous antibiotics were administered.  Wound VAC was removed from the left groin.  Both lower extremities were prepped and a sterile surgical field was created.    Preprocedure timeout was conducted.  Previously placed Vicryl sutures in the center of the left groin wound were removed.  We found our vein bypass graft.  6000 units of heparin were given.  A stay suture of 6-0 Prolene was placed and the bypass graft was accessed in antegrade fashion with a micropuncture needle followed by placement of a microwire.  Microcatheter was placed.  Arteriogram was performed.  This showed a large AV fistula in the mid graft.  No flow was identified distal to that.  These 2 side branches of the great saphenous vein were just above the incision we had made about the knee.  This incision was extended for 3 cm.  The great saphenous vein was dissected out.  We identified an area where it had an excellent  pulse and then there was loss of pulse quality.  Around this we identified 2 large side branches which were ligated with 3-0 silk suture.  This immediately showed improvement in the quality of the pulse.  Completion arteriogram was performed which showed inline flow into the distal anastomosis on from thereon into the anterior tibial artery in retrograde fashion and posterior tibial artery in antegrade fashion and into the peroneal artery.  Satisfied with the result the procedure was concluded.    The lower thigh wound was closed with interrupted 3-0 Vicryl and skin was approximated with staples.  The groin wound was closed with 2-0 Vicryl and wound VAC sponge was applied.    Counts of instruments, sponges and needle was noted to be correct.    Patient was awakened and extubated and taken to the recovery room in stable condition.

## 2024-10-27 NOTE — PLAN OF CARE
Goal Outcome Evaluation:      Plan of Care Reviewed With: patient    Overall Patient Progress: no changeOverall Patient Progress: no change         Shift: 10/26/24, [id number]  POD# 1 Left femoral endarterectomy, Left femoral to tibial Peroneal trunk bypass, with left great saphenous vein, wound vac placed on left groin    Orientation: A&Ox4  Vital Signs: VSS on RA  Labs: Hep Xa draw scheduled for 2320, 1st therapeutic range from day shift.   Pain Management: Oxy PRN, scheduled tylenol  Bowel: No BM, BS audible  Bladder: No output this shift; patient on peritoneal dialysis  Drains: Peritoneal dialysis  IV: 2 R PIVs - SL & infusing hep drip @ 10mL/hr, L PIV - SL  Wounds/Incisions: LLE incision site with dressing with small/moderate drainage, L groin incision site w/ wound vac in place 75 negative pressure  Diet: Mod carb / NPO 0000  Activity Level: Ax1 with walker  Tests/Procedures: Partial amputation of L great toe scheduled for 0730 10/27.  Anticipated Discharge Date/Plan: Pending  Significant Information: Stop hep drip at 0530 10/27

## 2024-10-27 NOTE — ANESTHESIA POSTPROCEDURE EVALUATION
Patient: Arnulfo Pritchett    Procedure: Procedure(s):  AMPUTATION, TOE left great toe       Anesthesia Type:  MAC    Note:  Disposition: Inpatient   Postop Pain Control: Uneventful            Sign Out: Well controlled pain   PONV: No   Neuro/Psych: Uneventful            Sign Out: Acceptable/Baseline neuro status   Airway/Respiratory: Uneventful            Sign Out: Acceptable/Baseline resp. status   CV/Hemodynamics: Uneventful            Sign Out: Acceptable CV status; No obvious hypovolemia; No obvious fluid overload   Other NRE: NONE   DID A NON-ROUTINE EVENT OCCUR? No           Last vitals:  Vitals Value Taken Time   BP 95/63 10/27/24 0845   Temp 36.6  C (97.9  F) 10/27/24 0809   Pulse 98 10/27/24 0859   Resp 11 10/27/24 0859   SpO2 98 % 10/27/24 0859   Vitals shown include unfiled device data.    Electronically Signed By: Ervin Gonzalez DO  October 27, 2024  11:32 AM

## 2024-10-27 NOTE — ANESTHESIA PREPROCEDURE EVALUATION
Anesthesia Pre-Procedure Evaluation    Patient: Arnulfo Pritchett   MRN: [id number] : 1960        Procedure : Procedure(s):  Angiogram Lower Extremity Left  CREATION, BYPASS, VASCULAR, FEMORAL TO TIBIAL REVISION OF BYPASS LEFT COMMON FEMORAL TO TIBIAL.          Past Medical History:   Diagnosis Date    CAD (coronary artery disease)     Chronic systolic heart failure (H)     ESRD (end stage renal disease) on dialysis (H)     Gastroesophageal reflux disease without esophagitis     Gout     HLD (hyperlipidemia)     TALI (obstructive sleep apnea)     PAD (peripheral artery disease) (H)     S/p RLE stenting of SFA/popliteal arteries    Type 2 diabetes mellitus with diabetic neuropathy (H)       Past Surgical History:   Procedure Laterality Date    IR LOWER EXTREMITY ANGIOGRAM LEFT  10/17/2024      No Known Allergies   Social History     Tobacco Use    Smoking status: Former     Types: Cigarettes    Smokeless tobacco: Never   Substance Use Topics    Alcohol use: Not Currently     Comment: quit 20 years      Wt Readings from Last 1 Encounters:   10/27/24 78.1 kg (172 lb 3.2 oz)        Anesthesia Evaluation            ROS/MED HX  ENT/Pulmonary:     (+) sleep apnea,               tobacco use,                        Neurologic:     (+)              TIA, date: ,                 Cardiovascular:     (+) Dyslipidemia hypertension- Peripheral Vascular Disease-  CAD -  - stent-.      CHF  Last EF: 20                         Previous cardiac testing   Echo: Date: 24 Results:  Summary:    * The estimated ejection fraction is 20-25%.     * Left ventricular systolic function is severely decreased.     * There is mild to moderate concentric left ventricular hypertrophy.     * Detailed valvular analysis cannot be done due to limited nature of   study.    * Prior study from 10/24/2023. EF 20-25% - no change.   Findings   Left Ventricle     The left ventricle is normal in size. There is mild to moderate   concentric   left  ventricular hypertrophy. Left ventricular systolic function is   severely   decreased. The estimated ejection fraction is 20-25%. Left ventricular   segmental wall motion is globally hypokinetic.   Other Findings     Detailed valvular analysis cannot be done due to limited nature of study.   2D Measurements     Stress Test:  Date: 10/22/24 Results:     The nuclear stress test is abnormal. No evidence of ischemia. presence of visecral tracer artifact decreases specificity.     There is a medium sized area of infarction in the entire inferolateral segment(s) of the left ventricle extending into basal inferior segment.     There is a small area of nontransmural infarction in the apical segment(s) of the left ventricle.     TID is absent.     Left ventricular function is severely reduced.     The left ventricular ejection fraction at stress is 26%.     There is no prior study for comparison.    ECG Reviewed:  Date: Results:    Cath:  Date: Results:   MORTENSEN: ischemic. CAB.   METS/Exercise Tolerance:     Hematologic:       Musculoskeletal:       GI/Hepatic:     (+) GERD,                   Renal/Genitourinary: Comment: S/p Right Nephrectomy for RCC    (+) renal disease, type: ESRD, Pt requires dialysis, type: Peritoneal dialysis,          Endo:     (+)  type II DM,       Diabetic complications: nephropathy neuropathy.             Psychiatric/Substance Use:       Infectious Disease:       Malignancy:       Other:            Physical Exam    Airway        Mallampati: II   TM distance: > 3 FB   Neck ROM: full   Mouth opening: > 3 cm    Respiratory Devices and Support         Dental       (+) Edentulous      Cardiovascular   cardiovascular exam normal          Pulmonary   pulmonary exam normal                OUTSIDE LABS:  CBC:   Lab Results   Component Value Date    WBC 10.4 10/26/2024    WBC 6.5 10/23/2024    HGB 7.4 (L) 10/26/2024    HGB 8.9 (L) 10/25/2024    HCT 21.9 (L) 10/26/2024    HCT 30.9 (L) 10/23/2024      "10/26/2024     10/25/2024     BMP:   Lab Results   Component Value Date     (L) 10/27/2024     10/26/2024    POTASSIUM 4.6 10/27/2024    POTASSIUM 4.4 10/26/2024    CHLORIDE 94 (L) 10/27/2024    CHLORIDE 97 (L) 10/26/2024    CO2 28 10/27/2024    CO2 24 10/26/2024    BUN 43.1 (H) 10/27/2024    BUN 41.7 (H) 10/26/2024    CR 8.17 (H) 10/27/2024    CR 7.86 (H) 10/26/2024     (H) 10/27/2024     (H) 10/27/2024     COAGS:   Lab Results   Component Value Date    INR 1.28 (H) 10/26/2024     POC: No results found for: \"BGM\", \"HCG\", \"HCGS\"  HEPATIC:   Lab Results   Component Value Date    ALBUMIN 2.6 (L) 10/27/2024     OTHER:   Lab Results   Component Value Date    A1C 5.7 (H) 10/16/2024    TERENCE 9.2 10/27/2024    PHOS 5.4 (H) 10/27/2024    SED 86 (H) 10/15/2024       Anesthesia Plan    ASA Status:  4, emergent    NPO Status:  NPO Appropriate    Anesthesia Type: General.     - Airway: ETT   Induction: Intravenous.   Maintenance: Balanced.   Techniques and Equipment:     - Lines/Monitors: Arterial Line, 2nd IV     - Blood: T&S     - Drips/Meds: Phenylephrine     Consents    Anesthesia Plan(s) and associated risks, benefits, and realistic alternatives discussed. Questions answered and patient/representative(s) expressed understanding.     - Discussed:     - Discussed with:  Patient      - Extended Intubation/Ventilatory Support Discussed: No.      - Patient is DNR/DNI Status: No     Use of blood products discussed: Yes.     - Discussed with: Patient.     - Consented: consented to blood products            Reason for refusal: other.     Postoperative Care    Pain management: IV analgesics, Oral pain medications, Multi-modal analgesia.   PONV prophylaxis: Ondansetron (or other 5HT-3), Dexamethasone or Solumedrol     Comments:               Ervin Gonzalez, DO    I have reviewed the pertinent notes and labs in the chart from the past 30 days and (re)examined the patient.  Any updates or changes " from those notes are reflected in this note.     # Hyponatremia: Lowest Na = 133 mmol/L in last 2 days, will monitor as appropriate  # Hypochloremia: Lowest Cl = 94 mmol/L in last 2 days, will monitor as appropriate      # Hypoalbuminemia: Lowest albumin = 2 g/dL at 10/25/2024  6:08 AM, will monitor as appropriate  # Coagulation Defect: INR = 1.28 (Ref range: 0.85 - 1.15) and/or PTT = N/A, will monitor for bleeding    # Hypertension: Noted on problem list               # Financial/Environmental Concerns: none

## 2024-10-27 NOTE — PROGRESS NOTES
Post op day2 left femoral to TPT bypass using inside way.    Patient doing OK today this morning. However, signals are a little bit more difficult to find compared to yesterday. Monophasic PT DP branch signals appreciated. Obtaining arterial duplex after podiatry procedure to assess our bypass.

## 2024-10-27 NOTE — OP NOTE
[facility] Podiatry Operative Note    Patient Name:                           Arnulfo Pritchett  Patient YOB: 1960  Date of Surgery:                       10/27/2024  CSN:                                         [id number]    Pre-operative diagnosis: Pain of toe of left foot [M79.675]  Ischemic ulcer of toe, left, with unspecified severity (H) [L97.529]  Type 2 diabetes mellitus with left diabetic foot ulcer (H) [E11.621, L97.529]   Post-operative diagnosis: Same   Procedure: 1.  Partial left great toe amputation   Surgeon: Haley Joseph DPM, Podiatry/Foot and Ankle Surgery   Assistant(s): None   Anesthesia: MAC with local     Hemostasis:                             None    Estimated Blood Loss:              None    Speceimens:                            Left great toe for pathology    Materials:                                  None      Indications: Mr. Pritchett is a 64-year-old diabetic male that presented to the hospital with left black toe.  He underwent vascular intervention to try to help improve blood flow to the left leg and foot.  The remaining tissue we talked about going in and removing part of the toe to try to help prevent infection.  Risk benefits and complications were discussed with patient no guarantees were made.  He wishes to proceed with surgery.      Procecure: Patient is brought to the operating room placed operating table in the supine position.  Anesthesia was administered and local was injected.  The foot was prepped and draped using sterile technique.  Attention was directed to the distal aspect of the left great toe.  A fishmouth incision was made around the distal one third of the left great toe full-thickness down to bone.  The tissue was sharply dissected off of the base of the distal phalanx and the toe was disarticulated at the IPJ excising all of the necrotic tissue.  There really was not any bleeding noted to the tissue.  The head of the first  proximal phalanx bone was then removed using sagittal saw to assist with closure of the skin.  The wound was washed copious amounts normal saline.  The plantar skin flap was then rotated dorsally and reapproximated using 4-0 Prolene.  Patient's foot was placed in a dry sterile dressing.  Patient tolerated procedure and anesthesia well was transferred recovery with vital signs stable vascular status intact.    Haley Joseph DPM

## 2024-10-27 NOTE — ANESTHESIA CARE TRANSFER NOTE
Patient: Arnulfo Pritchett    Procedure: Procedure(s):  AMPUTATION, TOE left great toe       Diagnosis: Pain of toe of left foot [M79.675]  Ischemic ulcer of toe, left, with unspecified severity (H) [L97.529]  Type 2 diabetes mellitus with left diabetic foot ulcer (H) [E11.621, L97.529]  Diagnosis Additional Information: No value filed.    Anesthesia Type:   MAC     Note:    Oropharynx: oropharynx clear of all foreign objects  Level of Consciousness: awake  Oxygen Supplementation: room air    Independent Airway: airway patency satisfactory and stable    Vital Signs Stable: post-procedure vital signs reviewed and stable  Report to RN Given: handoff report given  Patient transferred to: PACU    Handoff Report: Identifed the Patient, Identified the Reponsible Provider, Reviewed the pertinent medical history, Discussed the surgical course, Reviewed Intra-OP anesthesia mangement and issues during anesthesia, Set expectations for post-procedure period and Allowed opportunity for questions and acknowledgement of understanding    Vitals:  Vitals Value Taken Time   BP 97/66 10/27/24 0809   Temp     Pulse 100 10/27/24 0812   Resp 11 10/27/24 0812   SpO2 99 % 10/27/24 0812   Vitals shown include unfiled device data.    Electronically Signed By: KEITH Barclay CRNA  October 27, 2024  8:13 AM

## 2024-10-27 NOTE — ANESTHESIA CARE TRANSFER NOTE
Patient: Arnulfo Pritchett    Procedure: Procedure(s):  Angiogram Lower Extremity Left  CREATION, BYPASS, VASCULAR, FEMORAL TO TIBIAL REVISION OF BYPASS LEFT COMMON FEMORAL TO TIBIAL.       Diagnosis: Critical limb ischemia of left lower extremity (H) [I70.222]  Diagnosis Additional Information: No value filed.    Anesthesia Type:   General     Note:    Oropharynx: oropharynx clear of all foreign objects and spontaneously breathing  Level of Consciousness: awake  Oxygen Supplementation: face mask  Level of Supplemental Oxygen (L/min / FiO2): 6  Independent Airway: airway patency satisfactory and stable  Dentition: dentition unchanged  Vital Signs Stable: post-procedure vital signs reviewed and stable  Report to RN Given: handoff report given  Patient transferred to: PACU    Handoff Report: Identifed the Patient, Identified the Reponsible Provider, Reviewed the pertinent medical history, Discussed the surgical course, Reviewed Intra-OP anesthesia mangement and issues during anesthesia, Set expectations for post-procedure period and Allowed opportunity for questions and acknowledgement of understanding      Vitals:  Vitals Value Taken Time   /77    Temp     Pulse 98 10/27/24 1528   Resp 13 10/27/24 1528   SpO2 100 % 10/27/24 1528   Vitals shown include unfiled device data.    Electronically Signed By: KEITH Llanes CRNA  October 27, 2024  3:29 PM

## 2024-10-27 NOTE — ANESTHESIA PROCEDURE NOTES
Airway       Patient location during procedure: OR       Procedure Start/Stop Times: 10/27/2024 1:45 PM  Staff -        Anesthesiologist:  Ervin Gonzalez DO       CRNA: Hodgkins, Christine Marie Volp, APRN CRNA       Performed By: CRNA  Consent for Airway        Urgency: elective  Indications and Patient Condition       Indications for airway management: alexsandra-procedural       Induction type:intravenous       Mask difficulty assessment: 2 - vent by mask + OA or adjuvant +/- NMBA    Final Airway Details       Final airway type: endotracheal airway       Successful airway: ETT - single  Endotracheal Airway Details        ETT size (mm): 8.0       Cuffed: yes       Successful intubation technique: video laryngoscopy       VL Blade Size: Lopez 4       Grade View of Cords: 1       Adjucts: stylet       Position: Right       Measured from: lips       Secured at (cm): 22       Bite block used: None    Post intubation assessment        Placement verified by: capnometry, equal breath sounds and chest rise        Number of attempts at approach: 1       Number of other approaches attempted: 0       Secured with: tape       Ease of procedure: easy       Dentition: Intact and Unchanged    Medication(s) Administered   Medication Administration Time: 10/27/2024 1:45 PM

## 2024-10-27 NOTE — PROGRESS NOTES
I was present in the ultrasound room as the ultrasound was being done. There is flow reversal in the distal graft. Paucity of flow in the distal graft.  We will take the patient to the OR for angiogram and revision.   Discussed with the patient. I also called the patient's son, Ervin, and explained everything. He has verbalized his understanding.

## 2024-10-27 NOTE — PROGRESS NOTES
Cycler set up per protocol and per treatment orders     Results from previous treatment:  Effluent color and clarity: yellow, clear  Total UF: 437ml  Initial Drain: 176ml  Avg Dwell: 1:17  Lost Dwell: 29 min    Cycler Serial Number: [id number]  Total Treatment Volume: 10,000mL  Total treatment time: 9 hrs  Fill Volume: 2000ml  Last Fill Volume:0ml  Heater ba.5% 6000mL;  Lot #: [id number];  Expiration Date: 2026  Side Bag #1: 2.5% 6000mL;  Lot #: [id number];  Expiration Date: 2026  Number of cycles including final fill: 5  Dwell Time: 1:22 minutes  Last Fill Volume: 0ml  Initial Drain Alarm: 0ml  PD orders reviewed with Patient procedure and ESRD teaching done and questions answered.  Cycler ready for hook-up.    Report given to: MICHELLE Barrera RN

## 2024-10-27 NOTE — PROGRESS NOTES
[facility]    Medicine Progress Note - Hospitalist Service    Date of Admission:  10/15/2024  Interval history:   Patient had surgery earlier today on his foot with podiatry.  Had amputation of his left great toe.  After surgery signals were difficult to determine based on vascular surgery assessment.  Had flow reversal on his ultrasound and went back to the OR for angiogram and revision  Found to have critical ischemia of the left lower extremity.  Ligation of side branches of the left great saphenous vein.  Seen back in the room being restarted on heparin at [facility] as per vascular    Assessment & Plan   Mr. Arnulfo Pritchett is a 62 yo male with Afib on warfarin, GERD, gout, insomnia, ESRD on PD, HFrEF, HTN, IDDM, diabetic peripheral neuropathy, CAD s/p stent placement x 3 (last 2021), TALI, HLD, and RLE PAD s/p balloon angio and stenting of SFA/popliteal arteries presented to [facility] ED with worsening pain and redness in left toe with streaking up the leg  for the last 2 days with throbbing pain.      Known history of peripheral vascular disease.  1) Admitted through [facility] 5/6- 5/11/2024 with right lower extremity arterial occlusion.  Prescribed Eliquis but having difficulties affording it over the 6 months prior to arrival.  Underwent right leg angiogram and recannulization of the SFA popliteal artery with balloon angioplasty and stenting (warfarin with heparin bridge and continued on Plavix with stop of aspirin)   2) Admit admit 7/9 to 7/11/2024 [facility] for left great toe blackening 7/9/2024 (had been treated with Augmentin.  X-ray showing no osteomyelitis or fracture): Arterial ultrasound with patent flow but x-ray 7/9 showing first phalangeal irregularity consistent with osteomyelitis.  Initially on IV Vanco and Zosyn.  Seen by podiatry debrided left great toe with no overt evidence of left foot osteomyelitis.  Thought to be a left great toe infection.   Received IV Vanco and Zosyn for 3 days and discharged on Augmentin for 2 weeks    Seen by podiatry 10/7 with left toe ulcer: Entire left hallux nail plate poorly attached with a subungual hematoma:   Current admission on 10/15/2024 >> increasing pain throbbing redness red streaking on his left foot extending from the left toe.     Worsening L great toe wound    Presented to OSH with 2 day history of erythema and streaking redness going up his leg with associated increase pain and throbbing noted. No fever but both CRP 55 and ESR 86 are elevated.   - XR of left foot show:Soft tissue swelling great toe. There is subtle cortical  irregularity and probable erosive change along the distal phalanx/tuft  of the great toe which does raise concern for osteomyelitis.     Obtained MRI foot ->  Distal phalanx of the first digit appears pointed distally with replacement of normal fatty marrow and a soft tissue defect at the distal tuft. About 14 mm of the distal phalanx appear normal with normal fat signal and without significant edema. Constellation of findings favors gangrene.  Podiatry consultation -> will need at least a partial left hallux amputation, once/if vascular status is optimized.   - 10/22 ID consult: Discontinue Zosyn. (Suspected discoloration from ischemia and not cellulitis) changed to oral Augmentin until bypass surgery.  - 10/26 podiatry follow-up with plans for partial total removal for eschar to prevent infection.  - Minimal weightbearing after surgery.  In a weightbearing boot  - Surgery scheduled for 10/27.  - Patient seen by ID as well: On Augmentin 1 tablet daily every 24      ## Hx of acute RLE arterial occlusion s/p SFA popliteal balloon angioplasty and stenting, 5/6/2024 (on warfarin and plavix)   10/27 taken back to the OR for critical limb ischemia of the left lower extremity.  Angiogram showing A prominent branch from GSV going to the deep system causing AVF. After ligation, good antegrade flow to  the foot with AT dominant runoff which collateralizes around the ankle to go to PT   Vascular surgery consultation  IR consulted -> 10/17 CTA abdomen/pelvis runoff for further evaluation of disease and to guide procedure planning.    - Right leg with no inflow or femoral-popliteal segment obstructions.  Occluded anterior tibial artery origin with proximal reconstruction without distal stenosis.Normal caliber peroneal artery. Tandem severe stenosis of the distal posterior tibial artery.   - Left leg: No inflow obstruction. Focal calcified severe stenosis of the proximal superficial femoral artery. Long segment occlusion of the mid to distal popliteal artery with low attenuation changes which may represent thrombus or soft  atherosclerotic plaque. Reconstitution of the origins of the posterior tibial arteries. Severe tandem stenoses of the distal posterior tibial artery.  Holding warfarin and plavix in anticipation for possible surgical intervention and allowed INR to drift down.   10/20 INR to 1.7 (started on heparin drip)   10/20 as per vascular note planning for left femoral to below the knee popliteal in situ bypass graft with embolectomy.   Patient is not a candidate for attempted endovascular thrombectomy since high likelihood of embolization per prior discussions   10/25 left femoral endarterectomy, left femoral to tibioperoneal bypass with left great saphenous vein with wound VAC placement.  10/26 follow-up with vascular surgery starting of heparin  Will need to get plan for anticoagulation resumption after surgery (on Coumadin prior to admit)   10/27 taken back to the OR more urgently:  A prominent branch from GSV going to the deep system causing AVF. After ligation, good antegrade flow to the foot with AT dominant runoff which collateralizes around the ankle to go to PT .   10/27 patient  seen post operative. Comfortable. Starting back on heparin per vascular surgery     Pain control  -Patient wanted  low-dose pain meds for pain control  -Initially 10/22 discussion about oxycodone or Dilaudid.  He received oxy but wants to do Dilaudid  -Changed to oral solution of Dilaudid as cannot cut the pill in for starting at 0.5 every 6 as needed  -Increase bowel regimen as he feels he might get constipated  -10/24 will increase to 1 mg Dilaudid every 6 as needed     ## Chronic paroxysmal afib s/p atrial ablation, 6/2023  ## HFrEF 2/2 ICM  ## HLD  ## HTN  ## Hx of CAD s/p ALESIA placement (last 2021)  PTA now warfarin and Plavix instead of apixaban as above, as well as Entresto, Coreg, torsemide and statin.  Most recent EF last month on 6/4 with severely decrease LV function with est EF 20-25%.    Noted blood pressure soft 90/70s -> BPs now normalized  Decrease Coreg from 12.5 mg BID to 6.25 mg BID  Holding Torsemide  resume Entresto  Resume statin  Cardiology consult for presurgical risk assessment with bypass planned   10/22 Isabell scan abnormal.  No evidence of ischemia.  Medium sized area of infarction in the entire inferolateral left ventricle extending into the inferolateral segment of left ventricle and basal segment.  Small area of nontransmural infarction apex.  EF 26  10/23 cardiology follow-up no ischemia.  Old infarcts.  No high risk findings.  High risk for volume overload but okay to proceed        ## ESRD on PD 2/2 DM nephropathy  ## Secondary hyperparathyroidism  ## Hyperkalemia  PTA on nightly PD of which he has been tolerating. Access RLQ double cuffed coiled PD catheter placed 4/16/2021.   - Nephrology consulted for PD planning  - Continue RRT medications below  - Continue calcitriol, cinacalcet and sevelamer carbonate  - Continue every other day gentamicin cream to PD cath side  - Telemetry for Hyperkalemia 5.7 -> now wnl -> tele stopped  - [facility] for hyperkalemia   - management per nephrology     ## Hx of RCC s/p R nephrectomy, 5/2022  -  CTA with 4 mm hyperenhancing nodule in lower pole of the left kidney    - follow up evaluation   -10/22 discussion with patient today who is aware of this finding and will follow-up with his urologist after discharge     ## DM type II  ## Diabetic neuropathy  Mod cho diet  Medium intensity sliding scale insulin  Last A1c 5.9 7/9/24  -10/24 blood sugars reviewed 169 - 147  -10/26 initially on low sodium diet but needed carb diet     Constipation:   -Struggles with constipation on MiraLAX daily  -Added Dulcolax suppository scheduled  -Senna twice daily  -10/24 BM resolved     # Other chronic, stable medical conditions:  # Hx of gout: Continue PTA allopurinol  # TALI: Intolerant to nasal CPAP   # GERD: Continue daily PPI  # Insomnia: Continue PTA melatonin.         Diet: NPO per Anesthesia Guidelines for Procedure/Surgery Except for: Meds  2 Gram Sodium Diet    DVT Prophylaxis: heparin drip   Rosario Catheter: PRESENT, indication: ?  (Error. Value could not be saved.), Surgical procedure, Surgical procedure  Lines: None     Cardiac Monitoring: None  Code Status: Full Code      Clinically Significant Risk Factors         # Hyponatremia: Lowest Na = 133 mmol/L in last 2 days, will monitor as appropriate  # Hypochloremia: Lowest Cl = 94 mmol/L in last 2 days, will monitor as appropriate      # Hypoalbuminemia: Lowest albumin = 2 g/dL at 10/25/2024  6:08 AM, will monitor as appropriate    # Coagulation Defect: INR = 1.28 (Ref range: 0.85 - 1.15) and/or PTT = N/A, will monitor for bleeding    # Hypertension: Noted on problem list               # Financial/Environmental Concerns: none         Disposition Plan     Medically Ready for Discharge: Anticipated in 2-4 Days       PAMELLA HIGGINBOTHAM MD  Hospitalist Service  [facility]  Securely message with [employer] (more info)  Text page via [employer] Paging/Directory   ______________________________________________________________________      Physical Exam   Vital Signs: Temp: 97.6  F (36.4  C) Temp src: Oral BP: 105/68 Pulse: 112    Resp: 16 SpO2: 97 % O2 Device: None (Room air) Oxygen Delivery: 6 LPM  Weight: 172 lbs 3.2 oz    General Appearance: Patient is awake and alert.  Respiratory: Clear to auscultation bilaterally without wheezes or rales  Cardiovascular: Regular rate and rhythm without gallop rub normal S1  and S2   GI: Positive bowel sounds soft no rebound guarding  Skin: Left foot wrapped.     Medical Decision Making       30 MINUTES SPENT BY ME on the date of service doing chart review, history, exam, documentation & further activities per the note.        Data     I have personally reviewed the following data over the past 24 hrs:    N/A  \   N/A   / N/A     133 (L) 94 (L) 43.1 (H) /  172 (H)   4.6 28 8.17 (H) \     ALT: N/A AST: N/A AP: N/A TBILI: N/A   ALB: 2.6 (L) TOT PROTEIN: N/A LIPASE: N/A       Imaging results reviewed over the past 24 hrs:   Recent Results (from the past 24 hours)   X-ray lt Foot 3 vw port: In PACU    Narrative    EXAM: XR FOOT PORTABLE LEFT 3 VIEWS  LOCATION: [facility]  DATE: 10/27/2024    INDICATION: Postop.  COMPARISON: 10/07/2024.      Impression    IMPRESSION: Postop interval partial left great toe amputation through the neck of the proximal phalanx left great toe. Amputation margin is sharp without evidence for acute osteomyelitis at the amputation margin. Remainder is unchanged. Extensive   arterial calcification. No acute displaced left foot fracture or dislocation. Remaining joint spaces are unchanged.     US Lower Extremity Arterial Duplex Left    Narrative    EXAM: US LOWER EXTREMITY ARTERIAL DUPLEX LEFT  LOCATION: [facility]  DATE: 10/27/2024    INDICATION: assess recent left fem   tp trunk bypass  COMPARISON: CTA abdomen and pelvis 10/17/2024  TECHNIQUE: Duplex utilizing 2D gray-scale imaging, Doppler interrogation with color-flow and spectral waveform analysis.    FINDINGS:     Post surgical changes of recent left femoral artery to TP trunk  bypass. Per the operative note, a partially translocated nonreversed in situ great saphenous vein was used for the bypass.    LEFT LOWER EXTREMITY ARTERIAL ASSESSMENT:  Common femoral artery: 48 cm/s,    Proximal bypass graft anastomosis: 23 cm/s, 6 mm  Proximal bypass graft: 202 cm/s, 5 mm  Mid bypass graft: 45 cm/s, 3 mm    Prominent bypass venous branch in the mid to distal thigh with high flow. There is reversal of flow in the bypass distal to this venous branch. The distal bypass graft is not visualized, partially due to limitations related to overlying skin staples.    Waveforms in the patent proximal and mid bypass graft are very low resistance.         Impression    IMPRESSION:  1.  Prominent venous branch originating from the LLE bypass in the mid to distal thigh is functioning as an arterial venous fistula. The bypass is patent proximal to this branch with very low resistance waveforms. There is reversal of flow in the bypass   distal to this venous branch.  2.  The distal anastomosis of the bypass is unable to visualized with ultrasound.  3.  Proximal to mid bypass is patent. Elevated velocity in the proximal bypass may suggest stenosis.

## 2024-10-27 NOTE — PROGRESS NOTES
Goal Outcome Evaluation:     Plan of Care Reviewed With: patient     Overall Patient Progress: no changeOverall Patient Progress: no change        Shift: 10/26/24-10/27/24, [id number]  POD# 2 Left femoral endarterectomy, Left femoral to tibial Peroneal trunk bypass, with left great saphenous vein, wound vac placed on left groin     Orientation: A&Ox4  Vital Signs: VSS on RA  Labs: Hep Xa Within range  Pain Management: Oxy PRN, scheduled tylenol  Bowel: No BM, BS audible  Bladder:   patient on peritoneal dialysis  Drains: Peritoneal dialysis, run overnight and disconnected this morning  IV: 2 R PIVs - SL & infusing hep drip @ 10mL/hr, until 0520 this am as it was stopped for surgery this AM  L PIV - SL  Wounds/Incisions: LLE incision site with dressing with small/moderate drainage, L groin incision site w/ wound vac in place 75 negative pressure, L great toe necrotic  Diet: Mod carb / NPO 0000  Activity Level: Ax1 with walker  Tests/Procedures: Partial amputation of L great toe scheduled for 0730, patient taken down to preop this morning. CHG bath completed x2  Anticipated Discharge Date/Plan: Pending  Significant Information:  doppler pulses to BLE, L pulse with increased sounds. Baseline numbness in BLE.

## 2024-10-27 NOTE — BRIEF OP NOTE
[facility]    Brief Operative Note    Pre-operative diagnosis: Critical limb ischemia of left lower extremity (H) [I70.222]  Post-operative diagnosis Same as pre-operative diagnosis    Procedure: Angiogram Lower Extremity Left, Left - Heart  CREATION, BYPASS, VASCULAR, FEMORAL TO TIBIAL REVISION OF BYPASS LEFT COMMON FEMORAL TO TIBIAL., Left - Groin    Surgeon: Surgeons and Role:     * Raul Gray MD - Primary     * Lizett Davis MD - Assisting  Anesthesia: Moderate Sedation   Estimated Blood Loss: Less than 50 ml    Drains: Left groin wound vac  Specimens: * No specimens in log *  Findings:   A prominent branch from GSV going to the deep system causing AVF. After ligation, good antegrade flow to the foot with AT dominant runoff which collateralizes around the ankle to go to PT .  Complications: None.  Implants: * No implants in log *

## 2024-10-27 NOTE — PROGRESS NOTES
VSS. A/O. Had toe amp & revision of angio L leg. L groin wound vac. Incisions on leg & toe dressing CDI. Doppler +2 pulses. Baseline neuropathy. Tylenol given. Tolerating diet. Hep gtt restarted, 10a recheck 1347.

## 2024-10-27 NOTE — OR NURSING
MARK Gonzalez gave OK for pt to leave PACU and return to his Gen/Surg room 2202. First he will go to a scheduled US then up to floor. Dr Gonzalez aware pt's POCT glucose is 247 (was 260 on floor s/p 2 units insulin PTA) and no new orders. ABCDs intact. Pt is tolerating ice water.

## 2024-10-27 NOTE — PROGRESS NOTES
[facility]    Nephrology Progress Note     Assessment & Plan       63 y.o man with ESRD and on PD     ESRD:      PD prescripton:  9 hrs  4 cylces  3047-3487 ml per cycle  Last fill of Extraneal of 1200 ml-He drains this around noon.  No last fill in hospital   volume status-no edema.  Controlled.     Severe PAD with left great toe wound -status post lower extremity bypass on 10/25, revision on 10/26     Anemia  CAD, EF of 20-25%: Seems like he is well compensated  CKD-MBD:               -Sensipar               -calcitriol     Plan:     Same PD Rx tonight.          Jennifer Jameson MD  [facility] - Nephrology   [phone]      Interval History     Back to the OR this morning for angiogram and revision left lower extremity bypass.  Patient is just back from the OR and slightly groggy on evaluation.  No issues with PD overnight.    Physical Exam   Temp: 97.6  F (36.4  C) Temp src: Oral BP: 110/71 Pulse: 98   Resp: 16 SpO2: 97 % O2 Device: None (Room air)    Vitals:    10/25/24 0640 10/26/24 0622 10/27/24 0622   Weight: 78.5 kg (173 lb 1.6 oz) 82.3 kg (181 lb 7 oz) 78.1 kg (172 lb 3.2 oz)     Vital Signs with Ranges  Temp:  [97  F (36.1  C)-98.8  F (37.1  C)] 97.6  F (36.4  C)  Pulse:  [] 98  Resp:  [11-20] 16  BP: ()/(63-83) 110/71  SpO2:  [97 %-98 %] 97 %  I/O last 3 completed shifts:  In: 980 [P.O.:980]  Out: 50 [Urine:50]    GENERAL APPEARANCE: Sleepy  RESP: Clear bilaterally  CV: RRR, nl S1/S2, no m/r/g   ABDOMEN: o/s/nt/nd, bs present.  PD catheter in place  EXTREMITIES/SKIN:  no edema    Medications   Current Facility-Administered Medications   Medication Dose Route Frequency Provider Last Rate Last Admin    dianeal PD LOW calcium-2.5% dex (calcium 2.5 mEq/L) 6,000 mL PERITONEAL DIALYSATE   Dialysis [facility] Supplied PD Haley Joseph, DPM, Podiatry/Foot and Ankle Surgery        heparin 25,000 units in 0.45% NaCl 250 mL ANTICOAGULANT infusion  0-5,000 Units/hr  Intravenous Continuous Haley Joseph DPM, Podiatry/Foot and Ankle Surgery 10 mL/hr at 10/27/24 1043 1,000 Units/hr at 10/27/24 1043    Patient is already receiving anticoagulation with heparin, enoxaparin (LOVENOX), warfarin (COUMADIN)  or other anticoagulant medication   Does not apply Continuous PRN Haley Joseph DPM, Podiatry/Foot and Ankle Surgery         Current Facility-Administered Medications   Medication Dose Route Frequency Provider Last Rate Last Admin    acetaminophen (TYLENOL) tablet 975 mg  975 mg Oral Q8H aHley Joseph DPM, Podiatry/Foot and Ankle Surgery   975 mg at 10/27/24 1045    allopurinol (ZYLOPRIM) tablet 200 mg  200 mg Oral QPM Haley Joseph DPM, Podiatry/Foot and Ankle Surgery   200 mg at 10/26/24 2044    amoxicillin-clavulanate (AUGMENTIN) 500-125 MG per tablet 1 tablet  1 tablet Oral Q24H BHUPENDRA Haley Joseph DPM, Podiatry/Foot and Ankle Surgery   1 tablet at 10/27/24 1046    aspirin EC tablet 81 mg  81 mg Oral Daily Haley Joseph DPM, Podiatry/Foot and Ankle Surgery   81 mg at 10/27/24 1045    atorvastatin (LIPITOR) tablet 40 mg  40 mg Oral At Bedtime Haley Joseph DPM, Podiatry/Foot and Ankle Surgery   40 mg at 10/26/24 2216    calcitRIOL (ROCALTROL) capsule 0.25 mcg  0.25 mcg Oral At Bedtime Haley Joseph DPM, Podiatry/Foot and Ankle Surgery   0.25 mcg at 10/26/24 2216    carvedilol (COREG) tablet 6.25 mg  6.25 mg Oral At Bedtime Haley Joseph DPM, Podiatry/Foot and Ankle Surgery   6.25 mg at 10/26/24 2216    cinacalcet (SENSIPAR) tablet 60 mg  60 mg Oral Once per day on Monday Wednesday Friday Haley Joseph DPM, Podiatry/Foot and Ankle Surgery   60 mg at 10/23/24 0932    insulin aspart (NovoLOG) injection (RAPID ACTING)  1-7 Units Subcutaneous TID AC Haley Joseph DPM, Podiatry/Foot and Ankle Surgery   1 Units at 10/27/24 1117    insulin aspart (NovoLOG) injection (RAPID ACTING)  1-5 Units Subcutaneous At Bedtime Haley Joseph DPM, Podiatry/Foot and Ankle Surgery    2 Units at 10/26/24 2218    pantoprazole (PROTONIX) EC tablet 40 mg  40 mg Oral BID Haley Joseph DPM, Podiatry/Foot and Ankle Surgery   40 mg at 10/27/24 1045    [START ON 10/28/2024] polyethylene glycol (MIRALAX) Packet 17 g  17 g Oral Daily Haley Joseph DPM, Podiatry/Foot and Ankle Surgery        sacubitril-valsartan (ENTRESTO) 49-51 MG per tablet 1 tablet  1 tablet Oral BID Haley Joseph DPM, Podiatry/Foot and Ankle Surgery   1 tablet at 10/26/24 2044    senna-docusate (SENOKOT-S/PERICOLACE) 8.6-50 MG per tablet 1 tablet  1 tablet Oral BID Haley Joseph DPM, Podiatry/Foot and Ankle Surgery   1 tablet at 10/27/24 1048    sevelamer carbonate (RENVELA) tablet 800 mg  800 mg Oral TID w/meals Haley Joseph DPM, Podiatry/Foot and Ankle Surgery   800 mg at 10/27/24 1117    sodium chloride (PF) 0.9% PF flush 3 mL  3 mL Intracatheter Q8H Haley Joseph DPM, Podiatry/Foot and Ankle Surgery   3 mL at 10/27/24 1016       Data   BMP  Recent Labs   Lab 10/27/24  1115 10/27/24  0828 10/27/24  0652 10/27/24  0615 10/27/24  0231 10/26/24  1431 10/26/24  0645 10/25/24  1148 10/25/24  0608 10/24/24  0729 10/24/24  0644   NA  --   --   --   --  133*  --  135  --  136  --  138   POTASSIUM  --   --   --   --  4.6  --  4.4  --  3.9  --  4.0   CHLORIDE  --   --   --   --  94*  --  97*  --  99  --  98   TERENCE  --   --   --   --  9.2  --  9.1  --  8.6*  --  8.1*   CO2  --   --   --   --  28  --  24  --  27  --  28   BUN  --   --   --   --  43.1*  --  41.7*  --  40.0*  --  40.5*   CR  --   --   --   --  8.17*  --  7.86*  --  8.12*  --  8.16*   * 247* 265* 293* 361*   < > 340*   < > 163*   < > 204*    < > = values in this interval not displayed.     [email or web address](phos:4)  CBC)  Recent Labs   Lab 10/26/24  0645 10/25/24  2225 10/25/24  1741 10/23/24  0637   WBC 10.4  --   --  6.5   HGB 7.4*  --  8.9* 10.3*   HCT 21.9*  --   --  30.9*   *  --   --  100    250  --  264       Recent Labs   Lab 10/26/24  0645    INR 1.28*         Attestation:   I have reviewed today's relevant vital signs, notes, medications, labs and imaging.

## 2024-10-27 NOTE — ANESTHESIA PROCEDURE NOTES
Arterial Line Procedure Note    Pre-Procedure   Staff -        Anesthesiologist:  Ervin Gonzalez DO       Performed By: anesthesiologist       Location: pre-op       Pre-Anesthestic Checklist: patient identified, IV checked, site marked, risks and benefits discussed, informed consent, monitors and equipment checked, pre-op evaluation and at physician/surgeon's request  Timeout:       Correct Patient: Yes        Correct Procedure: Yes        Correct Site: Yes        Correct Position: Yes   Line Placement:   This line was placed Pre Induction starting at 10/27/2024 12:50 PM and ending at 10/27/2024 1:00 PM  Procedure   Procedure: arterial line       Laterality: left       Insertion Site: brachial.  Sterile Prep        All elements of maximal sterile barrier technique followed       Patient Prep/Sterile Barriers: hand hygiene, sterile gloves, hat, mask, draped, draped       Skin prep: Chloraprep  Insertion/Injection        Technique: Seldinger Technique and ultrasound guided        1. Ultrasound was used to evaluate the access site.       2. Artery evaluated via ultrasound for patency/adequacy.       3. Using real-time ultrasound the needle/catheter was observed entering the artery/vein.       4. Permanent image was captured and entered into the patient's record.       5. The visualized structures were anatomically normal.       6. There were no apparent abnormal pathologic findings.       Catheter Type/Size: 20 gauge, 1.75 in/4.5 cm quick cath (integral wire)  Narrative        Tegaderm dressing used.       Complications: None apparent,        Arterial waveform: Yes        IBP within 10% of NIBP: Yes

## 2024-10-28 ENCOUNTER — APPOINTMENT (OUTPATIENT)
Dept: PHYSICAL THERAPY | Facility: CLINIC | Age: 64
DRG: 252 | End: 2024-10-28
Payer: MEDICARE

## 2024-10-28 LAB
ALBUMIN SERPL BCG-MCNC: 2.4 G/DL (ref 3.5–5.2)
ANION GAP SERPL CALCULATED.3IONS-SCNC: 12 MMOL/L (ref 7–15)
BUN SERPL-MCNC: 49.7 MG/DL (ref 8–23)
CALCIUM SERPL-MCNC: 9 MG/DL (ref 8.8–10.4)
CHLORIDE SERPL-SCNC: 96 MMOL/L (ref 98–107)
CREAT SERPL-MCNC: 7.97 MG/DL (ref 0.67–1.17)
EGFRCR SERPLBLD CKD-EPI 2021: 7 ML/MIN/1.73M2
GLUCOSE BLDC GLUCOMTR-MCNC: 205 MG/DL (ref 70–99)
GLUCOSE BLDC GLUCOMTR-MCNC: 214 MG/DL (ref 70–99)
GLUCOSE BLDC GLUCOMTR-MCNC: 241 MG/DL (ref 70–99)
GLUCOSE BLDC GLUCOMTR-MCNC: 322 MG/DL (ref 70–99)
GLUCOSE BLDC GLUCOMTR-MCNC: 340 MG/DL (ref 70–99)
GLUCOSE BLDC GLUCOMTR-MCNC: 367 MG/DL (ref 70–99)
GLUCOSE BLDC GLUCOMTR-MCNC: 377 MG/DL (ref 70–99)
GLUCOSE BLDC GLUCOMTR-MCNC: 379 MG/DL (ref 70–99)
GLUCOSE SERPL-MCNC: 255 MG/DL (ref 70–99)
HCO3 SERPL-SCNC: 28 MMOL/L (ref 22–29)
INR PPP: 1.2 (ref 0.85–1.15)
PHOSPHATE SERPL-MCNC: 5.2 MG/DL (ref 2.5–4.5)
PLATELET # BLD AUTO: 262 10E3/UL (ref 150–450)
POTASSIUM SERPL-SCNC: 4.5 MMOL/L (ref 3.4–5.3)
SODIUM SERPL-SCNC: 136 MMOL/L (ref 135–145)
UFH PPP CHRO-ACNC: 0.18 IU/ML
UFH PPP CHRO-ACNC: 0.27 IU/ML
UFH PPP CHRO-ACNC: 0.31 IU/ML

## 2024-10-28 PROCEDURE — 97530 THERAPEUTIC ACTIVITIES: CPT | Mod: GP

## 2024-10-28 PROCEDURE — 250N000013 HC RX MED GY IP 250 OP 250 PS 637: Performed by: PODIATRIST

## 2024-10-28 PROCEDURE — 120N000001 HC R&B MED SURG/OB

## 2024-10-28 PROCEDURE — 36415 COLL VENOUS BLD VENIPUNCTURE: CPT | Performed by: INTERNAL MEDICINE

## 2024-10-28 PROCEDURE — 250N000011 HC RX IP 250 OP 636: Performed by: PODIATRIST

## 2024-10-28 PROCEDURE — 85610 PROTHROMBIN TIME: CPT | Performed by: INTERNAL MEDICINE

## 2024-10-28 PROCEDURE — 99232 SBSQ HOSP IP/OBS MODERATE 35: CPT | Performed by: INTERNAL MEDICINE

## 2024-10-28 PROCEDURE — 99231 SBSQ HOSP IP/OBS SF/LOW 25: CPT

## 2024-10-28 PROCEDURE — 90945 DIALYSIS ONE EVALUATION: CPT | Performed by: INTERNAL MEDICINE

## 2024-10-28 PROCEDURE — 85049 AUTOMATED PLATELET COUNT: CPT | Performed by: PODIATRIST

## 2024-10-28 PROCEDURE — 85520 HEPARIN ASSAY: CPT | Performed by: INTERNAL MEDICINE

## 2024-10-28 PROCEDURE — 99231 SBSQ HOSP IP/OBS SF/LOW 25: CPT | Performed by: PODIATRIST

## 2024-10-28 PROCEDURE — 97161 PT EVAL LOW COMPLEX 20 MIN: CPT | Mod: GP

## 2024-10-28 PROCEDURE — 36415 COLL VENOUS BLD VENIPUNCTURE: CPT

## 2024-10-28 PROCEDURE — 80069 RENAL FUNCTION PANEL: CPT | Performed by: PODIATRIST

## 2024-10-28 PROCEDURE — 85520 HEPARIN ASSAY: CPT

## 2024-10-28 RX ORDER — WARFARIN SODIUM 5 MG/1
5 TABLET ORAL ONCE
Status: DISCONTINUED | OUTPATIENT
Start: 2024-10-28 | End: 2024-10-28

## 2024-10-28 RX ADMIN — SENNOSIDES AND DOCUSATE SODIUM 1 TABLET: 50; 8.6 TABLET ORAL at 20:35

## 2024-10-28 RX ADMIN — INSULIN ASPART 5 UNITS: 100 INJECTION, SOLUTION INTRAVENOUS; SUBCUTANEOUS at 06:38

## 2024-10-28 RX ADMIN — PANTOPRAZOLE SODIUM 40 MG: 40 TABLET, DELAYED RELEASE ORAL at 08:48

## 2024-10-28 RX ADMIN — ALLOPURINOL 200 MG: 100 TABLET ORAL at 20:35

## 2024-10-28 RX ADMIN — INSULIN ASPART 2 UNITS: 100 INJECTION, SOLUTION INTRAVENOUS; SUBCUTANEOUS at 13:08

## 2024-10-28 RX ADMIN — SACUBITRIL AND VALSARTAN 1 TABLET: 49; 51 TABLET, FILM COATED ORAL at 08:51

## 2024-10-28 RX ADMIN — ATORVASTATIN CALCIUM 40 MG: 40 TABLET, FILM COATED ORAL at 21:47

## 2024-10-28 RX ADMIN — OXYCODONE HYDROCHLORIDE 5 MG: 5 TABLET ORAL at 21:47

## 2024-10-28 RX ADMIN — INSULIN ASPART 3 UNITS: 100 INJECTION, SOLUTION INTRAVENOUS; SUBCUTANEOUS at 21:48

## 2024-10-28 RX ADMIN — POLYETHYLENE GLYCOL 3350 17 G: 17 POWDER, FOR SOLUTION ORAL at 08:48

## 2024-10-28 RX ADMIN — OXYCODONE HYDROCHLORIDE 5 MG: 5 TABLET ORAL at 13:08

## 2024-10-28 RX ADMIN — HEPARIN SODIUM 1150 UNITS/HR: 10000 INJECTION, SOLUTION INTRAVENOUS at 13:44

## 2024-10-28 RX ADMIN — SACUBITRIL AND VALSARTAN 1 TABLET: 49; 51 TABLET, FILM COATED ORAL at 21:48

## 2024-10-28 RX ADMIN — PANTOPRAZOLE SODIUM 40 MG: 40 TABLET, DELAYED RELEASE ORAL at 20:35

## 2024-10-28 RX ADMIN — CARVEDILOL 6.25 MG: 6.25 TABLET, FILM COATED ORAL at 21:47

## 2024-10-28 RX ADMIN — CALCITRIOL CAPSULES 0.25 MCG 0.25 MCG: 0.25 CAPSULE ORAL at 21:48

## 2024-10-28 RX ADMIN — ASPIRIN 81 MG: 81 TABLET, COATED ORAL at 08:49

## 2024-10-28 RX ADMIN — ACETAMINOPHEN 975 MG: 325 TABLET, FILM COATED ORAL at 08:49

## 2024-10-28 RX ADMIN — SEVELAMER CARBONATE 800 MG: 800 TABLET, FILM COATED ORAL at 17:37

## 2024-10-28 RX ADMIN — SEVELAMER CARBONATE 800 MG: 800 TABLET, FILM COATED ORAL at 08:50

## 2024-10-28 RX ADMIN — INSULIN ASPART 2 UNITS: 100 INJECTION, SOLUTION INTRAVENOUS; SUBCUTANEOUS at 17:37

## 2024-10-28 RX ADMIN — AMOXICILLIN AND CLAVULANATE POTASSIUM 1 TABLET: 500; 125 TABLET, FILM COATED ORAL at 08:49

## 2024-10-28 RX ADMIN — SEVELAMER CARBONATE 800 MG: 800 TABLET, FILM COATED ORAL at 13:08

## 2024-10-28 RX ADMIN — ACETAMINOPHEN 975 MG: 325 TABLET, FILM COATED ORAL at 01:26

## 2024-10-28 RX ADMIN — ACETAMINOPHEN 975 MG: 325 TABLET, FILM COATED ORAL at 17:36

## 2024-10-28 RX ADMIN — CINACALCET 60 MG: 30 TABLET ORAL at 08:51

## 2024-10-28 NOTE — PROVIDER NOTIFICATION
Brief update:    3 units novolog x1 ordered for hyperglycemia (received 2 units overnight last night)    Carlos French MD  3:13 AM    Still elevated BG (got decadron preop). A bit early for recheck (1hr after administration), but actually increasing from prior.    Additional 3 units now, recheck at 6AM ordered.    Carlos French MD  4:29 AM

## 2024-10-28 NOTE — PROGRESS NOTES
Care Management Follow Up    Length of Stay (days): 13    Expected Discharge Date: 10/29/2024     Concerns to be Addressed: discharge planning  wants PCP f/up scheduled and need to check if [facility] can change to smaller PD bags  Patient plan of care discussed at interdisciplinary rounds: Yes    Anticipated Discharge Disposition: Dialysis Services, Home       Anticipated Discharge Services: Other (see comment) (Patient is planning to hire a private duty PCA)  Anticipated Discharge DME: walker    Patient/family educated on Medicare website which has current facility and service quality ratings:    Education Provided on the Discharge Plan: Other (see comments) (ongoing)  Patient/Family in Agreement with the Plan: yes    Referrals Placed by CM/SW: Internal Clinic Care Coordination, Specialty Providers, Scheduled Follow-up appointments  Private pay costs discussed:  discussed that [facility] could possibly assist    Discussed  Partnership in Safe Discharge Planning  document with patient/family: No     Handoff Completed: No, handoff not indicated or clinically appropriate    Additional Information:  Follow up with patient. Agreeable for home care referral for RN, PT, OT home health aide. Referral sent to [facility]. Would not want a TCU. PT reevaluating after his toe amp over weekend.     Next Steps: secure Home care agency    1355 patient called writer and states now after working with PT he feels he will need TCU post discharge. Writer cancelled Home Care referral and brought patient the list of TCU's in his zip code from the Medicare website.     Pt/family was provided with the Medicare Compare list for SNF.  Discussed associated Medicare star ratings to assist with choice for referrals/discharge planning Yes    Education was given to pt/family that star ratings are updated/maintained by Medicare and can be reviewed by visiting www.medicare.gov Yes     Follow up with patient tomorrow regarding  choice. Patient will discuss with his family today.    Calli Hyman RN   [facility]   Phone [phone], Vocera or [phone]

## 2024-10-28 NOTE — PROGRESS NOTES
"   10/28/24 1300   Appointment Info   Signing Clinician's Name / Credentials (PT) Jami Silva, ROSETTA   Student Supervision On-site supervision provided;Direct supervision provided;Line of sight supervision provided;Direct Patient Contact Provided;Therapy services provided with the co-signing licensed therapist guiding and directing the services, and providing the skilled judgement and assessment throughout the session  (Sanjay Meadows DPT)   Rehab Comments (PT) Minimal heel WBing in post op shoe   Living Environment   People in Home alone   Current Living Arrangements apartment   Home Accessibility no concerns   Transportation Anticipated car, drives self;family or friend will provide   Living Environment Comments Pt reports he lives alone in an apartment complex. Daughter lives nearby and can assist \"sometimes\" for help with groceries. Also has a friend in area that can provide assist \"sometimes\"   Self-Care   Usual Activity Tolerance fair   Current Activity Tolerance poor   Regular Exercise No   Equipment Currently Used at Home cane, straight   Fall history within last six months no   Activity/Exercise/Self-Care Comment Pt reports the use of cane at all times, both inside and outside of apartment. Reports could ambulate 50-60 yd w/ use of SEC before having to take a seated rest break d/t generalized weakness.   General Information   Onset of Illness/Injury or Date of Surgery 10/15/24   Referring Physician Haley Joseph, DPM, Podiatry/Foot and Ankle Surgery   Patient/Family Therapy Goals Statement (PT) Return home   Pertinent History of Current Problem (include personal factors and/or comorbidities that impact the POC) L big toe amputation & revision of angio L leg   Existing Precautions/Restrictions weight bearing   Weight-Bearing Status - LUE full weight-bearing   Weight-Bearing Status - RUE full weight-bearing   Weight-Bearing Status - LLE   (Minimal heel WB)   Weight-Bearing Status - RLE full weight-bearing "   Cognition   Affect/Mental Status (Cognition) WNL   Orientation Status (Cognition) oriented x 4   Follows Commands (Cognition) WNL   Pain Assessment   Patient Currently in Pain Yes, see Vital Sign flowsheet  (2/10 pain in L LE at start of session)   Integumentary/Edema   Integumentary/Edema   (L foot wrapped in gauze)   Posture    Posture Not impaired   Range of Motion (ROM)   Range of Motion ROM deficits secondary to surgical procedure   Strength (Manual Muscle Testing)   Strength (Manual Muscle Testing) Deficits observed during functional mobility   Bed Mobility   Comment, (Bed Mobility) Pt performed supine to sit w/ mod A at L LE   Transfers   Comment, (Transfers) Pt performed STS w/ FWW and min A   Gait/Stairs (Locomotion)   Comment, (Gait/Stairs) Pt ambulated ~10 ft w/ use of FWW and CGA.   Balance   Balance Comments Good seated balance EOB. Fair standing balance w/ use of FWW   Coordination   Coordination no deficits were identified   Muscle Tone   Muscle Tone no deficits were identified   Clinical Impression   Criteria for Skilled Therapeutic Intervention Yes, treatment indicated   PT Diagnosis (PT) Impaired ambulation   Influenced by the following impairments Impaired strength, balance and activity tolerance   Functional limitations due to impairments Impaired ADLs, IADLs and functional mobility   Clinical Presentation (PT Evaluation Complexity) stable   Clinical Presentation Rationale Clinical judgment   Clinical Decision Making (Complexity) low complexity   Planned Therapy Interventions (PT) balance training;bed mobility training;home exercise program;patient/family education;ROM (range of motion);stair training;strengthening;transfer training;progressive activity/exercise;gait training   Risk & Benefits of therapy have been explained evaluation/treatment results reviewed;care plan/treatment goals reviewed;risks/benefits reviewed;current/potential barriers reviewed;participants voiced agreement with care  plan;participants included;patient   PT Total Evaluation Time   PT Eval, Low Complexity Minutes (57130) 10   Physical Therapy Goals   PT Frequency 5x/week   PT Predicted Duration/Target Date for Goal Attainment 11/07/24   PT Goals Bed Mobility;Transfers;Gait   PT: Bed Mobility Independent;Supine to/from sit;Within precautions   PT: Transfers Modified independent;Sit to/from stand;Assistive device;Within precautions   PT: Gait Modified independent;Rolling walker;25 feet;Within precautions   Interventions   Interventions Quick Adds Therapeutic Activity   Therapeutic Activity   Therapeutic Activities: dynamic activities to improve functional performance Minutes (72033) 32   Symptoms Noted During/After Treatment Fatigue;Increased pain;Shortness of breath   Treatment Detail/Skilled Intervention Pt supine in bed at start of session. Agreeable to therapy. Educated on min heel WBing on L LE. Therapist donned surgical shoe on L LE. Pt required mod A x 1 for assist at L LE during bed mobility tasks this session. Pt performed STS x 3 throughout duration of session w/ FWW and min A-CGA. Cueing for sequencing and hand placement. Also cueing to kick out L LE during transfer. Pt ambulated ~10 ft x 2 w/ FWW and CGA. Slow colin. Pt able to hop on R LE w/ use of FWW. Continuous cueing for walker management and sequencing. Pt able to abide by WB restrictions during gait training this session. Pt required seated rest break in between each bout of ambulation. Pt performed sit to supine w/ mod A x 1 for assist at L LE. Therapist doffed surgical shoe. Pt supine in bed at end of session. Call light in hand. Bed alarm on. All needs met.   PT Discharge Planning   PT Plan Review WB restrictions, review bed mobility and transfers, increase amb dist   PT Discharge Recommendation (DC Rec) Transitional Care Facility   PT Rationale for DC Rec Pt is below baseline mobility. Bed mobility is mod A x 1 for assist at L LE. Transfers and short dist  "amb w/ FWW and CGA-min A. Min heel WBing in L LE and use of surgical shoe. At baseline, pt ambulates short distances w/ use of SEC. He lives independently in an apartment. Reports he would recieve \"some\" assist from friends/family. Wishes to return home w/ use of PCA. However, pt would benefit from TCU in order to improve upon functional strength and mobility.   PT Brief overview of current status Goals of therapy will be to address safe mobility and make recs for d/c to next level of care. Pt and RN will continue to follow all falls risk precautions as documented by RN staff while hospitalized.   Physical Therapy Time and Intention   Timed Code Treatment Minutes 32   Total Session Time (sum of timed and untimed services) 42     "

## 2024-10-28 NOTE — PLAN OF CARE
Surgery/POD#: 3 - L femoral endarectomy, femtib bypass with L saphenous veins, wound vac placement, 1- Revision of femtib bypass, L great toe amp  Orientation: AOx4   ABNL VS/O2: soft BP on RA   ABNL Labs: see chart   Pain Management: denies need for pain med, on scheduled tylenol   Bowel/Bladder: constipated, got Miralax, passing gas, Rosario pulled today, DTV, bladder scanned 60cc, pt reports little urine at baseline  Drains/IVs: IVSL x3, heparin running   Wounds/incisions: LLE incision with dressing CDI, L groin foam site wnl, wound vac little output, Peritoneal dialysis catheter clamped, dressing changed this am, Foot dressing changed by Md today  Diet: <2g Na, mod carb   Activity Level: assist x1, ambulated in room   Tests/Procedures: ERCP tomorrow   Anticipated  DC Date: pending progress   Significant Information: baseline neuropathy, Doppler pulses +2, mepilex coccyx prevention

## 2024-10-28 NOTE — PROGRESS NOTES
MD Notification    Notified Person: MD    Notified Person Name: Carlos Manolo DOWD    Notification Date/Time: 10/28 0625    Notification Interaction: Vocera messaging     Purpose of Notification: update MD on BG of 340 after administration of 3u aspart x 2. Confirm if ok to give 5 units per morning regimen as pt is currently NPO    Orders Received:   Okay for 5, but recheck BG in 2 hr.     Comments:  Day team will need to adjust his insulin timing orders if he can't eat later this AM

## 2024-10-28 NOTE — PROGRESS NOTES
MD Notification    Notified Person: MD    Notified Person Name: Carlos French MD    Notification Date/Time:  10/28/24 0307    Notification Interaction: vocera     Purpose of Notification: update MD on elevated BG of 379    Orders Received: one time order for 3 U aspart    Comments:  given

## 2024-10-28 NOTE — PROGRESS NOTES
Podiatry / Foot and Ankle Surgery Progress Note    October 28th, 2024    Subject: Patient was seen at bedside for follow up for his left foot. Had partial hallux amputation yesterday. States feels well. Less pain to left foot compared to prior to vascular procedure. Just finished getting up with PT.     Objective:  Vitals: /75 (BP Location: Right arm)   Pulse 91   Temp 98.4  F (36.9  C) (Oral)   Resp 19   Wt 73.1 kg (161 lb 2.5 oz)   SpO2 97%   BMI 22.07 kg/m    BMI= Body mass index is 22.07 kg/m .    A1C: 5.7 (10/16/2024)    General:  Patient is alert and orientated.  NAD.    Vascular:  DP and PT pulses are faintly palpable.  No edema or varicosities noted.  CFT's < 3secs.  Skin temp is normal.    Neuro:  Light and gross touch sensation intact to digits, dorsum, and plantar aspects of the feet.    Derm: Left foot dressing changed: incision site intact. No yessica ischemia. No cellulitis. No devascularized tissue.     Musculoskeletal:  No foot deformity noted.      Imaging: mri left foot - I personally reviewed the xrays.   Distal phalanx of the first digit appears pointed distally with  replacement of normal fatty marrow and a soft tissue defect at the  distal tuft. About 14 mm of the distal phalanx appear normal with  normal fat signal and without significant edema. Constellation of  findings favors gangrene.  2. No other osseous abnormalities to suggest osteomyelitis.  3. Degenerative changes of the interphalangeal joint and the  metatarsophalangeal joint of the first digit.  4. Widespread edema throughout the musculature as can be seen in  microvascular disease associated with diabetes.        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   Now s/p partial left hallux amputation on 10/27    Medical Decision Making/Plan:    -Discussed all findings with patient. Chart and imaging reviewed.   -Left  foot dressing changed: incision sites intact. Appears viable. No cellulitis.   -WB to heel of LLE   -Will place dressing change orders for bedside RNs.  -Will sign off. Leonel text with questions. Plan for follow up outpatient with Dr. Joseph.         Alyce Salas, GANGAM

## 2024-10-28 NOTE — PROGRESS NOTES
VASCULAR SURGERY PROGRESS NOTE    Subjective:  Resting comfortably in bed, no acute events noted.     Objective:  Intake/Output Summary (Last 24 hours) at 10/28/2024 0710  Last data filed at 10/28/2024 0348  Gross per 24 hour   Intake 980 ml   Output 325 ml   Net 655 ml     PHYSICAL EXAM:  BP 97/61 (BP Location: Left arm, Patient Position: Semi-Soto's)   Pulse 101   Temp 98.7  F (37.1  C) (Oral)   Resp 16   Wt 77.4 kg (170 lb 10.2 oz)   SpO2 97%   BMI 23.37 kg/m    General: The patient is alert and oriented. Appropriate. No acute distress  Psych: pleasant affect, answers questions appropriately  Skin: Color appropriate for race, warm, dry.  Respiratory: The patient does not require supplemental oxygen. Breathing unlabored  GI:  Abdomen soft, nontender to light palpation.  Extremities: Wound vac in place, no ecchymosis noted. Wound vac has minimal output. Incisions covered with dressing, no strikethrough. Biphasic strong DP on the left     ASSESSMENT:  Arnulfo Pritchett is a 64 year old male status post left femoral to TPT bypass on 10/25 w/ ligation of side branches with GSV with application of wound vac on 10/27    PLAN:  -can be on regular diet  -continue current medications  -CMS and doppler checks  -continue heparin drip for afib, can transition back starting tonight with previous anticoagulation, restart plavix tomorrow, 10/29  -oob  -incentive spirometer    Discussed pt history, exam, assessment and plan with Dr. Gray of the vascular surgery service, who is in agreement with the above.    Cecilia Stearns NP  VASCULAR SURGERY

## 2024-10-28 NOTE — PROGRESS NOTES
[facility]     Renal Progress Note       SHORTHAND KEY FOR MY NOTES:  c = with, s = without, p = after, a = before, x = except, asx = asymptomatic, tx = transplant or treatment, sx = symptoms or symptomatic, cx = canceled or culture, rxn = reaction, yday = yesterday, nl = normal, abx = antibiotics, fxn = function, dx = diagnosis, dz = disease, m/h = melena/hematochezia, c/d/l/ha = cramping/dizziness/lightheadedness/headache, d/c = discharge or diarrhea/constipation, f/c/n/v = fevers/chills/nausea/vomiting, cp/sob = chest pain/shortness of breath, tbv = total body volume, rxn = reaction, tdc = tunneled dialysis catheter, pta = prior to admission, hd = hemodialysis, pd = peritoneal dialysis, hhd = home hemodialysis, edw = estimated dry wt         Assessment/Plan:     1.  ESKD.  Pt is on PD and doing well.  He tolerated all 2.5% bags last night.  No c/d/l.  UF was ok ~600-700 ml.  A.  Will continue same PD orders tonight.    2.  Severe PAD.  Pt had an angio and revision yday.  His pain is decently controlled.  A.  Follow clinically.        Interval History:     Pt feels ok and has no major complaints.  He did ok c PD yday.          Medications and Allergies:     Current Facility-Administered Medications   Medication Dose Route Frequency Provider Last Rate Last Admin    acetaminophen (TYLENOL) tablet 975 mg  975 mg Oral Q8H Haley Joseph DPM, Podiatry/Foot and Ankle Surgery   975 mg at 10/28/24 0849    allopurinol (ZYLOPRIM) tablet 200 mg  200 mg Oral QPM Haley Joseph DPM, Podiatry/Foot and Ankle Surgery   200 mg at 10/27/24 2032    amoxicillin-clavulanate (AUGMENTIN) 500-125 MG per tablet 1 tablet  1 tablet Oral Q24H Count includes the [facility] Haley Joseph DPM, Podiatry/Foot and Ankle Surgery   1 tablet at 10/28/24 0849    aspirin EC tablet 81 mg  81 mg Oral Daily Jake, Haley J, DPM, Podiatry/Foot and Ankle Surgery   81 mg at 10/28/24 0849    atorvastatin (LIPITOR) tablet 40 mg  40 mg Oral At Bedtime Jake  Haley PITT DPM, Podiatry/Foot and Ankle Surgery   40 mg at 10/27/24 2200    calcitRIOL (ROCALTROL) capsule 0.25 mcg  0.25 mcg Oral At Bedtime Haley Joseph DPM, Podiatry/Foot and Ankle Surgery   0.25 mcg at 10/27/24 2202    carvedilol (COREG) tablet 6.25 mg  6.25 mg Oral At Bedtime Haley Joseph DPM, Podiatry/Foot and Ankle Surgery   6.25 mg at 10/27/24 2159    cinacalcet (SENSIPAR) tablet 60 mg  60 mg Oral Once per day on Monday Wednesday Friday Haley Joseph DPM, Podiatry/Foot and Ankle Surgery   60 mg at 10/28/24 0851    insulin aspart (NovoLOG) injection (RAPID ACTING)  1-7 Units Subcutaneous TID AC Haley Joseph DPM, Podiatry/Foot and Ankle Surgery   2 Units at 10/28/24 1308    insulin aspart (NovoLOG) injection (RAPID ACTING)  1-5 Units Subcutaneous At Bedtime Haley Joseph DPM, Podiatry/Foot and Ankle Surgery   2 Units at 10/27/24 2203    pantoprazole (PROTONIX) EC tablet 40 mg  40 mg Oral BID Haley Joseph DPM, Podiatry/Foot and Ankle Surgery   40 mg at 10/28/24 0848    polyethylene glycol (MIRALAX) Packet 17 g  17 g Oral Daily Haley Joseph DPM, Podiatry/Foot and Ankle Surgery   17 g at 10/28/24 0848    sacubitril-valsartan (ENTRESTO) 49-51 MG per tablet 1 tablet  1 tablet Oral BID Haley Joseph DPM, Podiatry/Foot and Ankle Surgery   1 tablet at 10/28/24 0851    senna-docusate (SENOKOT-S/PERICOLACE) 8.6-50 MG per tablet 1 tablet  1 tablet Oral BID Haley Joseph DPM, Podiatry/Foot and Ankle Surgery   1 tablet at 10/27/24 2032    sevelamer carbonate (RENVELA) tablet 800 mg  800 mg Oral TID w/meals Haley Joseph DPM, Podiatry/Foot and Ankle Surgery   800 mg at 10/28/24 1308    sodium chloride (PF) 0.9% PF flush 3 mL  3 mL Intracatheter Q8H Haley Joseph DPM, Podiatry/Foot and Ankle Surgery   3 mL at 10/28/24 0854     No Known Allergies       Physical Exam:     Vitals were reviewed     , Blood pressure 107/75, pulse 91, temperature 98.4  F (36.9  C), temperature source Oral, resp. rate 19,  weight 73.1 kg (161 lb 2.5 oz), SpO2 97%.  Wt Readings from Last 3 Encounters:   10/28/24 73.1 kg (161 lb 2.5 oz)   10/15/24 78.6 kg (173 lb 4.5 oz)   10/08/24 78.7 kg (173 lb 8 oz)     Intake/Output Summary (Last 24 hours) at 10/28/2024 1548  Last data filed at 10/28/2024 1300  Gross per 24 hour   Intake 1170 ml   Output 225 ml   Net 945 ml     GENERAL APPEARANCE: pleasant, NAD, alert  RESP: CTA B c good efforts  CV: RRR, nl S1/S2   ABDOMEN: s/nt/nd, + PD catheter  EXTREMITIES/SKIN: + edema    -700 ml, clear fluid         Data:     CBC RESULTS:     Recent Labs   Lab 10/28/24  0827 10/26/24  0645 10/25/24  2225 10/25/24  1741 10/23/24  0637   WBC  --  10.4  --   --  6.5   RBC  --  2.17*  --   --  3.09*   HGB  --  7.4*  --  8.9* 10.3*   HCT  --  21.9*  --   --  30.9*    245 250  --  264     Basic Metabolic Panel:  Recent Labs   Lab 10/28/24  1147 10/28/24  0835 10/28/24  0827 10/28/24  0551 10/28/24  0420 10/28/24  0345 10/27/24  0615 10/27/24  0231 10/26/24  1431 10/26/24  0645 10/25/24  1148 10/25/24  0608 10/24/24  0729 10/24/24  0644 10/23/24  0951 10/23/24  0637   NA  --   --  136  --   --   --   --  133*  --  135  --  136  --  138  --  135   POTASSIUM  --   --  4.5  --   --   --   --  4.6  --  4.4  --  3.9  --  4.0  --  3.8   CHLORIDE  --   --  96*  --   --   --   --  94*  --  97*  --  99  --  98  --  97*   CO2  --   --  28  --   --   --   --  28  --  24  --  27  --  28  --  27   BUN  --   --  49.7*  --   --   --   --  43.1*  --  41.7*  --  40.0*  --  40.5*  --  41.7*   CR  --   --  7.97*  --   --   --   --  8.17*  --  7.86*  --  8.12*  --  8.16*  --  8.13*   * 241* 255* 340* 377* 367*   < > 361*   < > 340*   < > 163*   < > 204*   < > 218*   TERENCE  --   --  9.0  --   --   --   --  9.2  --  9.1  --  8.6*  --  8.1*  --  8.5*    < > = values in this interval not displayed.     INR  Recent Labs   Lab 10/26/24  0645 10/25/24  0608 10/24/24  0644 10/23/24  0637   INR 1.28* 1.15 1.21* 1.21*       Attestation:   I have reviewed today's relevant vital signs, notes, medications, labs and imaging.    Vega Guzman MD  [facility] - Nephrology  [phone]

## 2024-10-28 NOTE — PROGRESS NOTES
Goal Outcome Evaluation:     Plan of Care Reviewed With: patient     Overall Patient Progress: no changeOverall Patient Progress: no change        Shift: 10/27/24-10/28/24, [id number]  POD# 1 revision Angiogram Lower Extremity Left, Left - Heart  CREATION, BYPASS, VASCULAR, FEMORAL TO TIBIAL REVISION OF BYPASS LEFT COMMON FEMORAL TO TIBIAL., Left - Groin  POD# 3 Left femoral endarterectomy, Left femoral to tibial Peroneal trunk bypass, with left great saphenous vein, wound vac placed on left groin   Orientation: A&Ox4  Vital Signs: VSS on RA, ex tachycardic at times and BP slightly soft.   Labs: Hep Xa 0.18, recheck ordered for 0723 this morning  Pain Management: PT reporting pain 1-2/10, managed with scheduled tylenol.  Bowel: No BM, BS audible  Bladder:   patient on peritoneal dialysis  Drains: Peritoneal dialysis, run overnight and disconnected this morning  IV: 2 R PIVs - SL & infusing hep drip @ 11.5mL/hr,  L PIV - SL  Wounds/Incisions: LLE incision site CDI, L groin incision site w/ wound vac in place 75 negative pressure, L toe amputation dressing CDI  Diet: Mod carb / NPO 0000  Activity Level: Ax1 with walker, refused getting OOB during this shift.  Tests/Procedures:   Anticipated Discharge Date/Plan: Pending  Significant Information:  doppler pulses to BLE, L CMS intact, however Baseline numbness in BLE. PT also having elevated BG (pt received decadron in OR) , MD paged x3 with orders given for Aspart. MD also gave ok to give his morning regimen of aspart, 5 units with recheck in two hours. Will pass it along to day shift to recheck his sugar at 0840.

## 2024-10-29 ENCOUNTER — APPOINTMENT (OUTPATIENT)
Dept: PHYSICAL THERAPY | Facility: CLINIC | Age: 64
DRG: 252 | End: 2024-10-29
Attending: HOSPITALIST
Payer: MEDICARE

## 2024-10-29 LAB
ABO/RH(D): NORMAL
ALBUMIN SERPL BCG-MCNC: 2.3 G/DL (ref 3.5–5.2)
ANION GAP SERPL CALCULATED.3IONS-SCNC: 10 MMOL/L (ref 7–15)
ANTIBODY SCREEN: NEGATIVE
BLD PROD TYP BPU: NORMAL
BLOOD COMPONENT TYPE: NORMAL
BUN SERPL-MCNC: 50.8 MG/DL (ref 8–23)
CALCIUM SERPL-MCNC: 8.6 MG/DL (ref 8.8–10.4)
CHLORIDE SERPL-SCNC: 94 MMOL/L (ref 98–107)
CODING SYSTEM: NORMAL
CREAT SERPL-MCNC: 7.85 MG/DL (ref 0.67–1.17)
CROSSMATCH: NORMAL
EGFRCR SERPLBLD CKD-EPI 2021: 7 ML/MIN/1.73M2
ERYTHROCYTE [DISTWIDTH] IN BLOOD BY AUTOMATED COUNT: 15 % (ref 10–15)
GLUCOSE BLDC GLUCOMTR-MCNC: 174 MG/DL (ref 70–99)
GLUCOSE BLDC GLUCOMTR-MCNC: 199 MG/DL (ref 70–99)
GLUCOSE BLDC GLUCOMTR-MCNC: 227 MG/DL (ref 70–99)
GLUCOSE BLDC GLUCOMTR-MCNC: 233 MG/DL (ref 70–99)
GLUCOSE BLDC GLUCOMTR-MCNC: 314 MG/DL (ref 70–99)
GLUCOSE SERPL-MCNC: 310 MG/DL (ref 70–99)
HCO3 SERPL-SCNC: 28 MMOL/L (ref 22–29)
HCT VFR BLD AUTO: 17.2 % (ref 40–53)
HGB BLD-MCNC: 5.9 G/DL (ref 13.3–17.7)
INR PPP: 1.13 (ref 0.85–1.15)
INR PPP: 1.17 (ref 0.85–1.15)
ISSUE DATE AND TIME: NORMAL
MCH RBC QN AUTO: 35.3 PG (ref 26.5–33)
MCHC RBC AUTO-ENTMCNC: 34.3 G/DL (ref 31.5–36.5)
MCV RBC AUTO: 103 FL (ref 78–100)
PHOSPHATE SERPL-MCNC: 4.6 MG/DL (ref 2.5–4.5)
PLATELET # BLD AUTO: 310 10E3/UL (ref 150–450)
POTASSIUM SERPL-SCNC: 4.1 MMOL/L (ref 3.4–5.3)
RBC # BLD AUTO: 1.67 10E6/UL (ref 4.4–5.9)
SODIUM SERPL-SCNC: 132 MMOL/L (ref 135–145)
SPECIMEN EXPIRATION DATE: NORMAL
UFH PPP CHRO-ACNC: 0.25 IU/ML
UNIT ABO/RH: NORMAL
UNIT NUMBER: NORMAL
UNIT STATUS: NORMAL
UNIT TYPE ISBT: 5100
WBC # BLD AUTO: 10.8 10E3/UL (ref 4–11)

## 2024-10-29 PROCEDURE — 250N000013 HC RX MED GY IP 250 OP 250 PS 637: Performed by: INTERNAL MEDICINE

## 2024-10-29 PROCEDURE — 86850 RBC ANTIBODY SCREEN: CPT | Performed by: INTERNAL MEDICINE

## 2024-10-29 PROCEDURE — 250N000011 HC RX IP 250 OP 636: Performed by: PODIATRIST

## 2024-10-29 PROCEDURE — 85014 HEMATOCRIT: CPT | Performed by: INTERNAL MEDICINE

## 2024-10-29 PROCEDURE — 85048 AUTOMATED LEUKOCYTE COUNT: CPT | Performed by: INTERNAL MEDICINE

## 2024-10-29 PROCEDURE — 250N000013 HC RX MED GY IP 250 OP 250 PS 637: Performed by: PODIATRIST

## 2024-10-29 PROCEDURE — P9016 RBC LEUKOCYTES REDUCED: HCPCS | Performed by: INTERNAL MEDICINE

## 2024-10-29 PROCEDURE — 120N000001 HC R&B MED SURG/OB

## 2024-10-29 PROCEDURE — 250N000013 HC RX MED GY IP 250 OP 250 PS 637

## 2024-10-29 PROCEDURE — 80069 RENAL FUNCTION PANEL: CPT | Performed by: PODIATRIST

## 2024-10-29 PROCEDURE — 250N000012 HC RX MED GY IP 250 OP 636 PS 637: Performed by: INTERNAL MEDICINE

## 2024-10-29 PROCEDURE — 250N000011 HC RX IP 250 OP 636: Mod: JZ | Performed by: INTERNAL MEDICINE

## 2024-10-29 PROCEDURE — 85520 HEPARIN ASSAY: CPT | Performed by: INTERNAL MEDICINE

## 2024-10-29 PROCEDURE — 36415 COLL VENOUS BLD VENIPUNCTURE: CPT | Performed by: INTERNAL MEDICINE

## 2024-10-29 PROCEDURE — 90945 DIALYSIS ONE EVALUATION: CPT

## 2024-10-29 PROCEDURE — 97530 THERAPEUTIC ACTIVITIES: CPT | Mod: GP

## 2024-10-29 PROCEDURE — 85610 PROTHROMBIN TIME: CPT | Performed by: INTERNAL MEDICINE

## 2024-10-29 PROCEDURE — 99232 SBSQ HOSP IP/OBS MODERATE 35: CPT | Performed by: INTERNAL MEDICINE

## 2024-10-29 PROCEDURE — 86923 COMPATIBILITY TEST ELECTRIC: CPT | Performed by: INTERNAL MEDICINE

## 2024-10-29 PROCEDURE — 36415 COLL VENOUS BLD VENIPUNCTURE: CPT | Performed by: PODIATRIST

## 2024-10-29 PROCEDURE — 86900 BLOOD TYPING SEROLOGIC ABO: CPT | Performed by: INTERNAL MEDICINE

## 2024-10-29 RX ORDER — CLOPIDOGREL BISULFATE 75 MG/1
75 TABLET ORAL DAILY
Status: DISCONTINUED | OUTPATIENT
Start: 2024-10-29 | End: 2024-11-09 | Stop reason: HOSPADM

## 2024-10-29 RX ORDER — GENTAMICIN SULFATE 1 MG/G
CREAM TOPICAL DAILY PRN
Status: DISCONTINUED | OUTPATIENT
Start: 2024-10-29 | End: 2024-11-01

## 2024-10-29 RX ORDER — WARFARIN SODIUM 2 MG/1
6 TABLET ORAL
Status: COMPLETED | OUTPATIENT
Start: 2024-10-29 | End: 2024-10-29

## 2024-10-29 RX ORDER — AMOXICILLIN 250 MG
2 CAPSULE ORAL 2 TIMES DAILY
Status: DISCONTINUED | OUTPATIENT
Start: 2024-10-29 | End: 2024-11-09 | Stop reason: HOSPADM

## 2024-10-29 RX ADMIN — CLOPIDOGREL BISULFATE 75 MG: 75 TABLET ORAL at 10:19

## 2024-10-29 RX ADMIN — ACETAMINOPHEN 975 MG: 325 TABLET, FILM COATED ORAL at 16:28

## 2024-10-29 RX ADMIN — CARVEDILOL 6.25 MG: 6.25 TABLET, FILM COATED ORAL at 21:38

## 2024-10-29 RX ADMIN — PANTOPRAZOLE SODIUM 40 MG: 40 TABLET, DELAYED RELEASE ORAL at 20:26

## 2024-10-29 RX ADMIN — OXYCODONE HYDROCHLORIDE 5 MG: 5 TABLET ORAL at 02:28

## 2024-10-29 RX ADMIN — SEVELAMER CARBONATE 800 MG: 800 TABLET, FILM COATED ORAL at 08:37

## 2024-10-29 RX ADMIN — CALCITRIOL CAPSULES 0.25 MCG 0.25 MCG: 0.25 CAPSULE ORAL at 21:38

## 2024-10-29 RX ADMIN — INSULIN GLARGINE 20 UNITS: 100 INJECTION, SOLUTION SUBCUTANEOUS at 22:15

## 2024-10-29 RX ADMIN — PANTOPRAZOLE SODIUM 40 MG: 40 TABLET, DELAYED RELEASE ORAL at 08:36

## 2024-10-29 RX ADMIN — WARFARIN SODIUM 6 MG: 2 TABLET ORAL at 17:14

## 2024-10-29 RX ADMIN — SEVELAMER CARBONATE 800 MG: 800 TABLET, FILM COATED ORAL at 17:14

## 2024-10-29 RX ADMIN — SENNOSIDES AND DOCUSATE SODIUM 2 TABLET: 50; 8.6 TABLET ORAL at 21:37

## 2024-10-29 RX ADMIN — SACUBITRIL AND VALSARTAN 1 TABLET: 49; 51 TABLET, FILM COATED ORAL at 08:37

## 2024-10-29 RX ADMIN — DARBEPOETIN ALFA 100 MCG: 100 INJECTION, SOLUTION INTRAVENOUS; SUBCUTANEOUS at 16:28

## 2024-10-29 RX ADMIN — SACUBITRIL AND VALSARTAN 1 TABLET: 49; 51 TABLET, FILM COATED ORAL at 20:26

## 2024-10-29 RX ADMIN — AMOXICILLIN AND CLAVULANATE POTASSIUM 1 TABLET: 500; 125 TABLET, FILM COATED ORAL at 08:36

## 2024-10-29 RX ADMIN — ACETAMINOPHEN 975 MG: 325 TABLET, FILM COATED ORAL at 08:36

## 2024-10-29 RX ADMIN — HEPARIN SODIUM 1150 UNITS/HR: 10000 INJECTION, SOLUTION INTRAVENOUS at 11:12

## 2024-10-29 RX ADMIN — SEVELAMER CARBONATE 800 MG: 800 TABLET, FILM COATED ORAL at 12:32

## 2024-10-29 RX ADMIN — SENNOSIDES AND DOCUSATE SODIUM 1 TABLET: 50; 8.6 TABLET ORAL at 08:36

## 2024-10-29 RX ADMIN — ACETAMINOPHEN 975 MG: 325 TABLET, FILM COATED ORAL at 01:27

## 2024-10-29 RX ADMIN — POLYETHYLENE GLYCOL 3350 17 G: 17 POWDER, FOR SOLUTION ORAL at 08:36

## 2024-10-29 RX ADMIN — ALLOPURINOL 200 MG: 100 TABLET ORAL at 20:26

## 2024-10-29 RX ADMIN — ATORVASTATIN CALCIUM 40 MG: 40 TABLET, FILM COATED ORAL at 21:37

## 2024-10-29 NOTE — PROGRESS NOTES
"BRIEF NUTRITION REASSESSMENT  CURRENT DIET AND INTAKE:  Diet:  2g NA diet            Consuming % of meals, TID when able.   Pt seen in room, he reports he is doing well with eating, no issues. Denies having questions or concerns.     ANTHROPOMETRICS:  Height: 5' 11.65\"  Weight:  169 lbs 1.49 oz (76.7 kg)   Body mass index is 23.16 kg/m .   Weight Status: Normal BMI  IBW:  80 kg   %IBW: 96%  Weight History: Wt fluctuates due to PD, HF - down about 2% since 10/15, suspect largely related to fluid shifts.   Wt Readings from Last 10 Encounters:   10/29/24 76.7 kg (169 lb 1.5 oz)   10/15/24 78.6 kg (173 lb 4.5 oz)   10/08/24 78.7 kg (173 lb 8 oz)   10/07/24 80.7 kg (178 lb)   08/12/24 80.7 kg (178 lb)   07/09/24 80.3 kg (177 lb)       LABS/MEDS/PHYSICAL FINDINGS:  Na 132 (L)  BUN 50.8 (H), Cr 7.85 (H), GFR 7 (L) --> PD  Phos 4.6 (H) --> Takes phos binder   Glucose - 205-322 (H) --> High sliding scale insulin + 30 units q HS    Last BM x1 on 10/14 -> Bowel meds scheduled       10/27 - Partial left great toe amputation   10/27 - angiogram and revision left lower extremity bypass     MALNUTRITION:  Patient does not meet two of the following criteria necessary for diagnosing malnutrition.     % Weight Loss:  None noted  % Intake:  No decreased intake noted  Subcutaneous Fat Loss:  None observed  Muscle Loss:  None observed  Fluid Retention:  None noted    NUTRITION INTERVENTION:  Nutrition Diagnosis:  No nutrition diagnosis at this time.    Implementation:  Nutrition Education:  offered to answer questions, reviewed increased needs in setting of nightly PD. Pt w/o questions.     FOLLOW UP/MONITORING:   Will re-evaluate in 7 - 10 days, or sooner, if re-consulted.        "

## 2024-10-29 NOTE — PROGRESS NOTES
VASCULAR SURGERY PROGRESS NOTE    Subjective:  Resting comfortably in bed, no major acute events.  Hemoglobin 5.9 today was 7.4 - 3 days ago.  transfused 1 unit PRBC, recheck hemoglobin in a.m. has not yet had a bowel movement postsurgery.     Objective:  Intake/Output Summary (Last 24 hours) at 10/28/2024 0790  Last data filed at 10/28/2024 0348  Gross per 24 hour   Intake 980 ml   Output 325 ml   Net 655 ml     PHYSICAL EXAM:  /84 (BP Location: Right arm)   Pulse 100   Temp 99.2  F (37.3  C) (Oral)   Resp 16   Wt 76.7 kg (169 lb 1.5 oz)   SpO2 99%   BMI 23.16 kg/m    General: Appropriate. No acute distress  Psych: pleasant affect, answers questions appropriately  Skin: Color appropriate for race, warm, dry.  Respiratory: The patient does not require supplemental oxygen. Breathing unlabored  GI:  Abdomen soft, nontender to light palpation.  No distention  Extremities: Wound vac in place, no ecchymosis noted. Wound vac has minimal output. Incisions covered with dressing, mild strikethrough on upper and lower incisions however no drainage.  biphasic strong DP on the left     ASSESSMENT:  Arnulfo Pritchett is a 64 year old male status post left femoral to TPT bypass on 10/25 w/ ligation of side branches with GSV with application of wound vac on 10/27    PLAN:  -can be on regular diet  -continue current medications  -CMS and doppler checks  -continue heparin drip for afib with transition to warfarin.  Plan for bridging.  Note INR of 1.13 today.   -Moving forward patient should plan to discharge home on Plavix and warfarin, no aspirin.   -oob  -incentive spirometer  -Will increase bowel regimen today.     Discussed pt history, exam, assessment and plan with Dr. Gray of the vascular surgery service, who is in agreement with the above.    Kaiser Gamble MD  Vascular Surgery PGY4  To reach vascular surgery [facility] team on call, please go to [facility] and from the drop down find the following:   VASCULAR  SURGERY/[facility]  Then page the person listed to the right of day/night call. Can click the pager to page.

## 2024-10-29 NOTE — PROGRESS NOTES
[facility]    Medicine Progress Note - Hospitalist Service    Date of Admission:  10/15/2024    Interval history:   Patient having dialysis this am so did not interrupt as starting on peritoneal dialysis   He got up and walked today and very fatigued.   He knows that he needs traditional care facility.   Started back on heparin after vascular occlusion.  Ok per vascular to start back on anticoagulation.  He was on plavix and warfarin prior to admit and now on ASA and heparin.    NEED to have vascular surgery discuss anticoagulation with hospitalists plan for anticoagulation so hold for tonight and continue heparin only. HOLD ASA in am     Assessment & Plan   Mr. Arnulfo Pritchett is a 64 yo male with Afib on warfarin, GERD, gout, insomnia, ESRD on PD, HFrEF, HTN, IDDM, diabetic peripheral neuropathy, CAD s/p stent placement x 3 (last 2021), TALI, HLD, and RLE PAD s/p balloon angio and stenting of SFA/popliteal arteries presented to [facility] ED with worsening pain and redness in left toe with streaking up the leg  for the last 2 days with throbbing pain.       Worsening L great toe wound    Ischemic left toe   10/27 s/p amputation of left great toe   Presented to OSH with 2 day history of erythema and streaking redness going up his leg with associated increase pain and throbbing noted. No fever but both CRP 55 and ESR 86 are elevated.   - XR of left foot show:Soft tissue swelling great toe. There is subtle cortical  irregularity and probable erosive change along the distal phalanx/tuft  of the great toe which does raise concern for osteomyelitis.     Obtained MRI foot ->  Distal phalanx of the first digit appears pointed distally with replacement of normal fatty marrow and a soft tissue defect at the distal tuft. About 14 mm of the distal phalanx appear normal with normal fat signal and without significant edema. Constellation of findings favors gangrene.  Podiatry consultation -> will need at  least a partial left hallux amputation, once/if vascular status is optimized.   - 10/22 ID consult: Discontinue Zosyn. (Suspected discoloration from ischemia and not cellulitis) changed to oral Augmentin until bypass surgery.  - recommendations from  ID as well: On Augmentin 1 tablet daily every 24  - 10/27 s/p amputation of left great toe.   - post operative orders per podiatry   - 10/28 will need to confirm with ID plans for the augmentin for duration or any changes on 10/29       ## Hx of acute RLE arterial occlusion s/p SFA popliteal balloon angioplasty and stenting, 5/6/2024 (on warfarin and plavix)   10/27 taken back to the OR for critical limb ischemia of the left lower extremity.  Angiogram showing A prominent branch from GSV going to the deep system causing AVF. After ligation, good antegrade flow to the foot with AT dominant runoff which collateralizes around the ankle to go to PT   Vascular surgery consultation  IR consulted -> 10/17 CTA abdomen/pelvis runoff for further evaluation of disease and to guide procedure planning.    - Right leg with no inflow or femoral-popliteal segment obstructions.  Occluded anterior tibial artery origin with proximal reconstruction without distal stenosis.Normal caliber peroneal artery. Tandem severe stenosis of the distal posterior tibial artery.   - Left leg: No inflow obstruction. Focal calcified severe stenosis of the proximal superficial femoral artery. Long segment occlusion of the mid to distal popliteal artery with low attenuation changes which may represent thrombus or soft  atherosclerotic plaque. Reconstitution of the origins of the posterior tibial arteries. Severe tandem stenoses of the distal posterior tibial artery.  Holding warfarin and plavix in anticipation for possible surgical intervention and allowed INR to drift down.   10/20 INR to 1.7 (started on heparin drip)   10/20 as per vascular note planning for left femoral to below the knee popliteal in situ  bypass graft with embolectomy.   Patient is not a candidate for attempted endovascular thrombectomy since high likelihood of embolization per prior discussions   10/25 left femoral endarterectomy, left femoral to tibioperoneal bypass with left great saphenous vein with wound VAC placement.  10/26 follow-up with vascular surgery starting of heparin  10/27 taken back to the OR more urgently:  A prominent branch from GSV going to the deep system causing AVF. After ligation, good antegrade flow to the foot with AT dominant runoff which collateralizes around the ankle to go to PT .   10/27 patient  seen post operative. Comfortable. Starting back on heparin per vascular surgery   10/28 on heparin drip with vascular ok to resume anticoagulation with previous anticoagulation.   He is on ASA in hospital but on plavix PTA. On warfarin prior to admit.   Confirm with vascular surgery what can cross over anticoagulation. Plans to start plavix 10/29 and then may have ASA, plavix and heparin overlap with start of coumadin   Vascular to talk with hospitalists tomorrow 10/29 (HELD on warfarin until confirmation of plan )    Pain control  -Patient wanted low-dose pain meds for pain control  -Initially 10/22 discussion about oxycodone or Dilaudid.  He received oxy but wants to do Dilaudid  -Changed to oral solution of Dilaudid as cannot cut the pill in for starting at 0.5 every 6 as needed  -Increase bowel regimen as he feels he might get constipated  -back to oxycodone now. Prn      ## Chronic paroxysmal afib s/p atrial ablation, 6/2023  ## HFrEF 2/2 ICM  ## HLD  ## HTN  ## Hx of CAD s/p ALESIA placement (last 2021)  PTA now warfarin and Plavix instead of apixaban as above, as well as Entresto, Coreg, torsemide and statin.  Most recent EF last month on 6/4 with severely decrease LV function with est EF 20-25%.    Noted blood pressure soft 90/70s -> BPs now normalized  Decrease Coreg from 12.5 mg BID to 6.25 mg BID  Holding  Torsemide  resume Entresto  Resume statin  Cardiology consult for presurgical risk assessment with bypass planned   10/22 Isabell scan abnormal.  No evidence of ischemia.  Medium sized area of infarction in the entire inferolateral left ventricle extending into the inferolateral segment of left ventricle and basal segment.  Small area of nontransmural infarction apex.  EF 26  10/23 cardiology follow-up no ischemia.  Old infarcts.  No high risk findings.  High risk for volume overload but okay to proceed        ## ESRD on PD 2/2 DM nephropathy  ## Secondary hyperparathyroidism  ## Hyperkalemia  PTA on nightly PD of which he has been tolerating. Access RLQ double cuffed coiled PD catheter placed 4/16/2021.   - Nephrology consulted for PD planning  - Continue RRT medications below  - Continue calcitriol, cinacalcet and sevelamer carbonate  - Continue every other day gentamicin cream to PD cath side  - Lokelma for hyperkalemia   - management per nephrology     ## Hx of RCC s/p R nephrectomy, 5/2022  -  CTA with 4 mm hyperenhancing nodule in lower pole of the left kidney   - follow up evaluation   -10/22 discussion with patient today who is aware of this finding and will follow-up with his urologist after discharge     ## DM type II  ## Diabetic neuropathy  Mod cho diet  Medium intensity sliding scale insulin  Last A1c 5.9 7/9/24  -10/24 blood sugars reviewed 169 - 147  -10/26 initially on low sodium diet but needed carb diet   - 10/28 blood sugars higher today but appears he was given 4 mg dexamethasone for surgery.   - 10/28 re evaluate next 24 hours.     Constipation:   -Struggles with constipation on MiraLAX daily  -Added Dulcolax suppository scheduled  -Senna twice daily   Resolved      # Other chronic, stable medical conditions:  # Hx of gout: Continue PTA allopurinol  # TALI: Intolerant to nasal CPAP   # GERD: Continue daily PPI  # Insomnia: Continue PTA melatonin.         Diet: Combination Diet Moderate Consistent  Carb (60 g CHO per Meal) Diet; 2 gm NA Diet    DVT Prophylaxis: heparin drip   Rosario Catheter: Not present  Lines: None     Cardiac Monitoring: None  Code Status: Full Code      Clinically Significant Risk Factors         # Hyponatremia: Lowest Na = 133 mmol/L in last 2 days, will monitor as appropriate  # Hypochloremia: Lowest Cl = 94 mmol/L in last 2 days, will monitor as appropriate      # Hypoalbuminemia: Lowest albumin = 2 g/dL at 10/25/2024  6:08 AM, will monitor as appropriate    # Coagulation Defect: INR = 1.28 (Ref range: 0.85 - 1.15) and/or PTT = N/A, will monitor for bleeding    # Hypertension: Noted on problem list               # Financial/Environmental Concerns: none         Disposition Plan     Medically Ready for Discharge: Anticipated in 2-4 Days       PAMELLA HIGGINBOTHAM MD  Hospitalist Service  [facility]  Securely message with [employer] (more info)  Text page via [employer] Paging/Directory   ______________________________________________________________________      Physical Exam   Vital Signs: Temp: 98.6  F (37  C) Temp src: Oral BP: 116/79 Pulse: 104   Resp: 20 SpO2: 97 % O2 Device: None (Room air)    Weight: 161 lbs 2.5 oz  No exam on PD     Medical Decision Making       30 MINUTES SPENT BY ME on the date of service doing chart review, history, exam, documentation & further activities per the note.        Data     I have personally reviewed the following data over the past 24 hrs:    N/A  \   N/A   / 262     136 96 (L) 49.7 (H) /  214 (H)   4.5 28 7.97 (H) \     ALT: N/A AST: N/A AP: N/A TBILI: N/A   ALB: 2.4 (L) TOT PROTEIN: N/A LIPASE: N/A       Imaging results reviewed over the past 24 hrs:   No results found for this or any previous visit (from the past 24 hours).

## 2024-10-29 NOTE — PROGRESS NOTES
VSS. A/O. Up-1/walker. L groin vac, incision & toe dressing CDI. Doppler pulses. ! Unit RBC given. Tolerating diet. PD cath dressing changed. Hep gtt cont, 10a recheck am.

## 2024-10-29 NOTE — PROGRESS NOTES
[facility]     Renal Progress Note       SHORTHAND KEY FOR MY NOTES:  c = with, s = without, p = after, a = before, x = except, asx = asymptomatic, tx = transplant or treatment, sx = symptoms or symptomatic, cx = canceled or culture, rxn = reaction, yday = yesterday, nl = normal, abx = antibiotics, fxn = function, dx = diagnosis, dz = disease, m/h = melena/hematochezia, c/d/l/ha = cramping/dizziness/lightheadedness/headache, d/c = discharge or diarrhea/constipation, f/c/n/v = fevers/chills/nausea/vomiting, cp/sob = chest pain/shortness of breath, tbv = total body volume, rxn = reaction, tdc = tunneled dialysis catheter, pta = prior to admission, hd = hemodialysis, pd = peritoneal dialysis, hhd = home hemodialysis, edw = estimated dry wt         Assessment/Plan:     1.  ESKD.  Pt UF'd 661 ml last night c all 2.5% bags.  His sugars have been pretty high, so he prob isn't getting much clearance and will not UF properly, either.  His TBV is ok and he is not SOB.  A.  Agree c increasing ISS to high and resuming glargine.  B.  Will use all 2.5% bags again today.  Hopefully, he will UF more.  C.  If he cramps tonight, then give him some saline.    2.  Severe LLE PAD.  Pt had an angio and revision yday.  His pain was not well controlled overnight.  A.  Follow clinically.  B.  Pain meds prn.  C.  Further plans per Vasc Surg.    3.  Anemia.  Pt's hb dropped further today.  Part of this may be dilution + post-op.  A.  Agree c transfusion.  B.  Follow hb, clinically.  C.  Darbo 100 x 1.    4.  HTN.  Pt's BP is ok and on the lower side right now.  A.  Continue same meds/doses for now.  B.  Follow BP.    Case d/w Dr. España.        Interval History:     Pt didn't sleep well bc his leg hurt.  He denies any bleeding sx.  No c/d/l.  He notes that he usually swells in his legs if he is not UF'ing enough.  No sob.  Sugars have been high.          Medications and Allergies:     Current Facility-Administered  Medications   Medication Dose Route Frequency Provider Last Rate Last Admin    acetaminophen (TYLENOL) tablet 975 mg  975 mg Oral Q8H Haley Joseph DPM, Podiatry/Foot and Ankle Surgery   975 mg at 10/29/24 0836    allopurinol (ZYLOPRIM) tablet 200 mg  200 mg Oral QPM Haley Joseph DPM, Podiatry/Foot and Ankle Surgery   200 mg at 10/28/24 2035    amoxicillin-clavulanate (AUGMENTIN) 500-125 MG per tablet 1 tablet  1 tablet Oral Q24H BHUPENDRA Haley Joseph DPM, Podiatry/Foot and Ankle Surgery   1 tablet at 10/29/24 0836    [Held by provider] aspirin EC tablet 81 mg  81 mg Oral Daily Haley Joseph DPM, Podiatry/Foot and Ankle Surgery   81 mg at 10/28/24 0849    atorvastatin (LIPITOR) tablet 40 mg  40 mg Oral At Bedtime Haley Joseph DPM, Podiatry/Foot and Ankle Surgery   40 mg at 10/28/24 2147    calcitRIOL (ROCALTROL) capsule 0.25 mcg  0.25 mcg Oral At Bedtime Haley Joseph DPM, Podiatry/Foot and Ankle Surgery   0.25 mcg at 10/28/24 2148    carvedilol (COREG) tablet 6.25 mg  6.25 mg Oral At Bedtime Haley Joseph DPM, Podiatry/Foot and Ankle Surgery   6.25 mg at 10/28/24 2147    cinacalcet (SENSIPAR) tablet 60 mg  60 mg Oral Once per day on Monday Wednesday Friday Haley Joseph DPM, Podiatry/Foot and Ankle Surgery   60 mg at 10/28/24 0851    clopidogrel (PLAVIX) tablet 75 mg  75 mg Oral Daily Chai España MD   75 mg at 10/29/24 1019    insulin aspart (NovoLOG) injection (RAPID ACTING)  1-10 Units Subcutaneous TID AC Chai España MD   4 Units at 10/29/24 1230    insulin aspart (NovoLOG) injection (RAPID ACTING)  1-7 Units Subcutaneous At Bedtime Chai España MD        insulin glargine (LANTUS PEN) injection 30 Units  30 Units Subcutaneous At Bedtime Chai España MD        pantoprazole (PROTONIX) EC tablet 40 mg  40 mg Oral BID Haley Joseph DPM, Podiatry/Foot and Ankle Surgery   40 mg at 10/29/24 0836    polyethylene glycol (MIRALAX) Packet 17 g  17 g Oral Daily Haley Joseph,  ANGELITA, Podiatry/Foot and Ankle Surgery   17 g at 10/29/24 0836    sacubitril-valsartan (ENTRESTO) 49-51 MG per tablet 1 tablet  1 tablet Oral BID Haley Joseph DPM, Podiatry/Foot and Ankle Surgery   1 tablet at 10/29/24 0837    senna-docusate (SENOKOT-S/PERICOLACE) 8.6-50 MG per tablet 1 tablet  1 tablet Oral BID Haley Joseph DPM, Podiatry/Foot and Ankle Surgery   1 tablet at 10/29/24 0836    sevelamer carbonate (RENVELA) tablet 800 mg  800 mg Oral TID w/meals Haley Joseph DPM, Podiatry/Foot and Ankle Surgery   800 mg at 10/29/24 1232    sodium chloride (PF) 0.9% PF flush 3 mL  3 mL Intracatheter Q8H Haley Joseph DPM, Podiatry/Foot and Ankle Surgery   3 mL at 10/29/24 0837    warfarin ANTICOAGULANT (COUMADIN) tablet 6 mg  6 mg Oral ONCE at 18:00 Chai España MD        Warfarin Dose Required Daily - Pharmacist Managed  1 each Oral See Admin Instructions Chai España MD         No Known Allergies       Physical Exam:     Vitals were reviewed     , Blood pressure 103/68, pulse 96, temperature 98.2  F (36.8  C), temperature source Oral, resp. rate 18, weight 76.7 kg (169 lb 1.5 oz), SpO2 99%.  Wt Readings from Last 3 Encounters:   10/29/24 76.7 kg (169 lb 1.5 oz)   10/15/24 78.6 kg (173 lb 4.5 oz)   10/08/24 78.7 kg (173 lb 8 oz)     Intake/Output Summary (Last 24 hours) at 10/29/2024 1257  Last data filed at 10/29/2024 0845  Gross per 24 hour   Intake 320 ml   Output 500 ml   Net -180 ml     GENERAL APPEARANCE: pleasant, NAD, sleepy but arousable  RESP: CTA B c good efforts  CV: RRR c 2/6 m, nl S1/S2   ABDOMEN: s/nt/nd, + PD catheter  EXTREMITIES/SKIN: + edema; LLE dressed     ml, clear fluid         Data:     CBC RESULTS:     Recent Labs   Lab 10/29/24  0914 10/28/24  0827 10/26/24  0645 10/25/24  2225 10/25/24  1741 10/23/24  0637   WBC 10.8  --  10.4  --   --  6.5   RBC 1.67*  --  2.17*  --   --  3.09*   HGB 5.9*  --  7.4*  --  8.9* 10.3*   HCT 17.2*  --  21.9*  --   --  30.9*     262 245 250  --  264     Basic Metabolic Panel:  Recent Labs   Lab 10/29/24  1221 10/29/24  0900 10/29/24  0643 10/29/24  0631 10/28/24  2139 10/28/24  1720 10/28/24  0835 10/28/24  0827 10/27/24  0615 10/27/24  0231 10/26/24  1431 10/26/24  0645 10/25/24  1148 10/25/24  0608 10/24/24  0729 10/24/24  0644   NA  --   --  132*  --   --   --   --  136  --  133*  --  135  --  136  --  138   POTASSIUM  --   --  4.1  --   --   --   --  4.5  --  4.6  --  4.4  --  3.9  --  4.0   CHLORIDE  --   --  94*  --   --   --   --  96*  --  94*  --  97*  --  99  --  98   CO2  --   --  28  --   --   --   --  28  --  28  --  24  --  27  --  28   BUN  --   --  50.8*  --   --   --   --  49.7*  --  43.1*  --  41.7*  --  40.0*  --  40.5*   CR  --   --  7.85*  --   --   --   --  7.97*  --  8.17*  --  7.86*  --  8.12*  --  8.16*   * 233* 310* 314* 322* 214*   < > 255*   < > 361*   < > 340*   < > 163*   < > 204*   TERENCE  --   --  8.6*  --   --   --   --  9.0  --  9.2  --  9.1  --  8.6*  --  8.1*    < > = values in this interval not displayed.     INR  Recent Labs   Lab 10/29/24  0914 10/29/24  0643 10/28/24  2009 10/26/24  0645   INR 1.13 1.17* 1.20* 1.28*      Attestation:   I have reviewed today's relevant vital signs, notes, medications, labs and imaging.    Vega Guzman MD  [facility] - Nephrology  [phone]

## 2024-10-29 NOTE — PROGRESS NOTES
Cycler set up per protocol and per treatment orders     Results from previous treatment:  Effluent color and clarity: Chikis and clear, with no fibrin   Total UF: 661 ml  Initial Drain: 211 ml  Avg Dwell: 1:17  Lost Dwell: 0:29    Cycler Serial Number: [id number]  Total Treatment Volume: 08830 mL  Total treatment time: 9 hrs  Fill Volume: 2000 ml  Last Fill Volume:0 ml  Heater ba.5% 6000mL;  Lot #: [id number];  Expiration Date: 26  Side Bag #1: 2.5% 6000mL;  Lot #: [id number];  Expiration Date: 26    Number of cycles including final fill: 5  Dwell Time: 1:22 minutes  Last Fill Volume: 0 ml  Initial Drain Alarm: 10 ml  PD orders reviewed with Patient procedure and ESRD teaching done and questions answered.  Cycler ready for hook-up.    Report given to: MARYSOL Son consent form signed yes

## 2024-10-29 NOTE — PHARMACY-ANTICOAGULATION SERVICE
Clinical Pharmacy - Warfarin Dosing Consult     Pharmacy has been consulted to manage this patient s warfarin therapy.  Indication: Atrial Fibrillation  Therapy Goal: INR 2-3  Warfarin Prior to Admission: Yes  Warfarin PTA Regimen: 5 mg MWF & 7.5 mg ROW  Significant drug interactions: Plavix, heparin bridge, Augmentin (finished 10/29), ASA not yet resumed  Recent documented change in oral intake/nutrition: No  Dose Comments: Supratherapeutic on admission; opting for slightly lower than usual 7.5 mg dose given Augmentin completion today and low hgb today (did receive transfusion).    INR   Date Value Ref Range Status   10/29/2024 1.13 0.85 - 1.15 Final   10/29/2024 1.17 (H) 0.85 - 1.15 Final       Recommend warfarin 6 mg today.  Pharmacy will monitor Arnulfo D Shreyas daily and order warfarin doses to achieve specified goal.      Please contact pharmacy as soon as possible if the warfarin needs to be held for a procedure or if the warfarin goals change.

## 2024-10-29 NOTE — PROGRESS NOTES
Care Management Follow Up    Length of Stay (days): 14    Expected Discharge Date: 10/31/2024     Concerns to be Addressed: discharge planning  wants PCP f/up scheduled and need to check if [facility] can change to smaller PD bags  Patient plan of care discussed at interdisciplinary rounds: Yes    Anticipated Discharge Disposition: Dialysis Services, TCU      Anticipated Discharge Services: Other (see comment) (Patient is planning to hire a private duty PCA)  Anticipated Discharge DME: None    Patient/family educated on Medicare website which has current facility and service quality ratings:    Education Provided on the Discharge Plan: Other (see comments) (ongoing)  Patient/Family in Agreement with the Plan: yes    Referrals Placed by CM/SW: Internal Clinic Care Coordination, Specialty Providers, Scheduled Follow-up appointments  Private pay costs discussed: Not applicable    Discussed  Partnership in Safe Discharge Planning  document with patient/family: Yes:     Handoff Completed: No, handoff not indicated or clinically appropriate    Additional Information:  Writer received choices from patient for TCU  [facility]     Next Steps: Find accepting facility     KATELYN Duarte

## 2024-10-29 NOTE — PROVIDER NOTIFICATION
"Need to confirm anticoagulation with vascular surgery.     He was on plavix and warfarin prior to admission and warfarin drifted down with no reversal and heparin start.   He is on heparin and reported as ok to start \"anticoagulation as prior to admit\" NEED to confirm with vascular what anticoagulation is safe/next steps  He is on Aspirin now but not on this PTA. Suspect plavix stopped with procedures.   Ok to resume plavix per notes.     CONFIRM with vascular surgery am 10/29 if ok to resume coumadin, now getting asa, plans to start plavix in am and on heparin drip.     Dr. Singleton has held ASA for AM and need to confer with hospitalist.       "

## 2024-10-29 NOTE — PROGRESS NOTES
[facility]  Hospitalist Progress Note  Chai España MD  10/29/2024    Assessment & Plan   Mr. Arnulfo Pritchett is a 62 yo male with Afib on warfarin, GERD, gout, insomnia, ESRD on PD, HFrEF, HTN, IDDM, diabetic peripheral neuropathy, CAD s/p stent placement x 3 (last 2021), TALI, HLD, and RLE PAD s/p balloon angio and stenting of SFA/popliteal arteries presented to [facility] ED with worsening pain and redness in left toe with streaking up the leg  for the last 2 days with throbbing pain.       Worsening L great toe wound    Ischemic left toe   10/27 s/p amputation of left great toe   Presented to OSH with 2 day history of erythema and streaking redness going up his leg with associated increase pain and throbbing noted. No fever but both CRP 55 and ESR 86 are elevated.   - XR of left foot show:Soft tissue swelling great toe. There is subtle cortical  irregularity and probable erosive change along the distal phalanx/tuft  of the great toe which does raise concern for osteomyelitis.     Obtained MRI foot ->  Distal phalanx of the first digit appears pointed distally with replacement of normal fatty marrow and a soft tissue defect at the distal tuft. About 14 mm of the distal phalanx appear normal with normal fat signal and without significant edema. Constellation of findings favors gangrene.  Podiatry consultation -> will need at least a partial left hallux amputation, once/if vascular status is optimized.   - 10/22 ID consult: Discontinue Zosyn. (Suspected discoloration from ischemia and not cellulitis) changed to oral Augmentin until bypass surgery.  - recommendations from  ID as well: On Augmentin 1 tablet daily every 24 completed 7 days.  - 10/27 s/p amputation of left great toe, podiatry has signed off with wound dressing orderes and follow up after discharge with Dr Joseph.        ## Hx of acute RLE arterial occlusion s/p SFA popliteal balloon angioplasty and stenting, 5/6/2024 (on  warfarin and plavix)   10/27 taken back to the OR for critical limb ischemia of the left lower extremity.  Angiogram showing A prominent branch from GSV going to the deep system causing AVF. After ligation, good antegrade flow to the foot with AT dominant runoff which collateralizes around the ankle to go to PT   Vascular surgery consultation  IR consulted -> 10/17 CTA abdomen/pelvis runoff for further evaluation of disease and to guide procedure planning.    - Right leg with no inflow or femoral-popliteal segment obstructions.  Occluded anterior tibial artery origin with proximal reconstruction without distal stenosis.Normal caliber peroneal artery. Tandem severe stenosis of the distal posterior tibial artery.   - Left leg: No inflow obstruction. Focal calcified severe stenosis of the proximal superficial femoral artery. Long segment occlusion of the mid to distal popliteal artery with low attenuation changes which may represent thrombus or soft  atherosclerotic plaque. Reconstitution of the origins of the posterior tibial arteries. Severe tandem stenoses of the distal posterior tibial artery  10/20 INR to 1.7 (started on heparin drip)   10/20 as per vascular note planning for left femoral to below the knee popliteal in situ bypass graft with embolectomy.   Patient is not a candidate for attempted endovascular thrombectomy since high likelihood of embolization per prior discussions   10/25 left femoral endarterectomy, left femoral to tibioperoneal bypass with left great saphenous vein with wound VAC placement.  10/26 follow-up with vascular surgery starting of heparin  10/27 taken back to the OR more urgently:  A prominent branch from GSV going to the deep system causing AVF. After ligation, good antegrade flow to the foot with AT dominant runoff which collateralizes around the ankle to go to PT .   10/27 patient  seen post operative. Comfortable. Starting back on heparin per vascular surgery   10/28 on heparin  drip with vascular ok to resume anticoagulation with previous anticoagulation.   He is on ASA in hospital but on plavix PTA. On warfarin prior to admit.   Confirm with vascular surgery what can cross over anticoagulation.  Restart plavix 10/29 and coumadin. Will defer to vascular surgery about need for bridging or for 2nd antiplatelet   Await complete anticoagulation plan from vascular surgery     Pain control  -Patient wanted low-dose pain meds for pain control  -Initially 10/22 discussion about oxycodone or Dilaudid.  He received oxy but wants to do Dilaudid  -Changed to oral solution of Dilaudid as cannot cut the pill in for starting at 0.5 every 6 as needed  -Increase bowel regimen as he feels he might get constipated  -back to oxycodone now PRN     ## Chronic paroxysmal afib s/p atrial ablation, 6/2023  ## HFrEF 2/2 ICM  ## HLD  ## HTN  ## Hx of CAD s/p ALESIA placement (last 2021)  PTA now warfarin and Plavix instead of apixaban as above, as well as Entresto, Coreg, torsemide and statin.  Most recent EF last month on 6/4 with severely decrease LV function with est EF 20-25%.    Noted blood pressure soft 90/70s -> BPs now normalized  Decrease Coreg from 12.5 mg BID to 6.25 mg BID  Holding Torsemide  continue Entresto  continue statin  10/22 Isabell scan abnormal.  No evidence of ischemia.  Medium sized area of infarction in the entire inferolateral left ventricle extending into the inferolateral segment of left ventricle and basal segment.  Small area of nontransmural infarction apex.  EF 26  10/23 cardiology follow-up no ischemia.  Old infarcts.  No high risk findings.  High risk for volume overload but okay to proceed    ## Anemia  ## Acute on chronic   Patient at admission 10.9 and with gradual drop in Hgb in setting of bypass surgery.    Hgb 10.3 > 7.4 > 5.9  Transfuse 1 unit of pRBC and recheck  Monitor Hgb        ## ESRD on PD 2/2 DM nephropathy  ## Secondary hyperparathyroidism  ## Hyperkalemia  PTA on nightly  PD of which he has been tolerating. Access RLQ double cuffed coiled PD catheter placed 4/16/2021.   - Nephrology consulted for PD planning  - Continue RRT medications below  - Continue calcitriol, cinacalcet and sevelamer carbonate  - Continue every other day gentamicin cream to PD cath side  - Lokelma for hyperkalemia   - management per nephrology   - Torsemide on hold (he only takes this very intermittently)     ## Hx of RCC s/p R nephrectomy, 5/2022  -  CTA with 4 mm hyperenhancing nodule in lower pole of the left kidney   - follow up evaluation   - patient is aware of this finding and will follow-up with his urologist after discharge     ## DM type II  ## Diabetic neuropathy  Mod cho diet  Medium intensity sliding scale insulin  Last A1c 5.9 7/9/24  -10/24 blood sugars reviewed 169 - 147  -10/26 initially on low sodium diet but needed carb diet   - 10/28 blood sugars higher today but appears he was given 4 mg dexamethasone for surgery.   - 10/28 BG elevated, change to high intensity sliding scale insulin  - 10/29 start Lantus 20 units at bedtime (normally on 39 units at bedtime     Constipation:   -Struggles with constipation on MiraLAX daily  -Added Dulcolax suppository scheduled  -Senna twice daily     # Other chronic, stable medical conditions:  # Hx of gout: Continue PTA allopurinol  # TALI: Intolerant to nasal CPAP   # GERD: Continue daily PPI  # Insomnia: Continue PTA melatonin.         Diet:  2 gm NA Diet    DVT Prophylaxis: heparin drip   Rosario Catheter: Not present  Lines: None     Cardiac Monitoring: None  Code Status: Full Code          Clinically Significant Risk Factors         # Hyponatremia: Lowest Na = 133 mmol/L in last 2 days, will monitor as appropriate  # Hypochloremia: Lowest Cl = 94 mmol/L in last 2 days, will monitor as appropriate      # Hypoalbuminemia: Lowest albumin = 2 g/dL at 10/25/2024  6:08 AM, will monitor as appropriate     # Coagulation Defect: INR = 1.28 (Ref range: 0.85 - 1.15)  and/or PTT = N/A, will monitor for bleeding    # Hypertension: Noted on problem list                # Financial/Environmental Concerns: none         Disposition Plan     Medically Ready for Discharge:   -- therapy recommending TCU    Disposition:     Interval History   -- chart reviewed  -- podiatry signed off  -- noted elevated BG    -Data reviewed today: I reviewed all new labs and imaging over the last 24 hours. I personally reviewed no images or EKG's today.    Physical Exam    , Blood pressure 137/84, pulse 100, temperature 99.2  F (37.3  C), temperature source Oral, resp. rate 18, weight 76.7 kg (169 lb 1.5 oz), SpO2 99%.  Vitals:    10/28/24 0553 10/28/24 0900 10/29/24 0633   Weight: 77.4 kg (170 lb 10.2 oz) 73.1 kg (161 lb 2.5 oz) 76.7 kg (169 lb 1.5 oz)     Vital Signs with Ranges  Temp:  [98.6  F (37  C)-99.2  F (37.3  C)] 99.2  F (37.3  C)  Pulse:  [100-111] 100  Resp:  [18-20] 18  BP: (105-137)/(72-84) 137/84  SpO2:  [97 %-99 %] 99 %  I/O's Last 24 hours  I/O last 3 completed shifts:  In: 770 [P.O.:770]  Out: 100 [Urine:100]    Constitutional: Awake, alert, cooperative, no apparent distress  Respiratory: Clear to auscultation bilaterally, no crackles or wheezing  Cardiovascular: Regular rate and rhythm, normal S1 and S2, and no murmur noted  GI: Normal bowel sounds, soft, non-distended, non-tender, PD catheter  Skin/Integumen: No rashes, no cyanosis, ++ LLE edema, foot dressing  Other:      Medications   All medications were reviewed.  Current Facility-Administered Medications   Medication Dose Route Frequency Provider Last Rate Last Admin    dianeal PD LOW calcium-2.5% dex (calcium 2.5 mEq/L) 6,000 mL PERITONEAL DIALYSATE   Dialysis [facility] Supplied PD Haley Joseph DPMEGHAN, Podiatry/Foot and Ankle Surgery        heparin 25,000 units in 0.45% NaCl 250 mL ANTICOAGULANT infusion  0-5,000 Units/hr Intravenous Continuous Haley Joseph DPM, Podiatry/Foot and Ankle Surgery 11.5 mL/hr at 10/28/24 7574 1,150  Units/hr at 10/28/24 1344    Patient is already receiving anticoagulation with heparin, enoxaparin (LOVENOX), warfarin (COUMADIN)  or other anticoagulant medication   Does not apply Continuous PRN Haley Joseph DPM, Podiatry/Foot and Ankle Surgery         Current Facility-Administered Medications   Medication Dose Route Frequency Provider Last Rate Last Admin    acetaminophen (TYLENOL) tablet 975 mg  975 mg Oral Q8H Haley Joseph DPM, Podiatry/Foot and Ankle Surgery   975 mg at 10/29/24 0836    allopurinol (ZYLOPRIM) tablet 200 mg  200 mg Oral QPM Haley Joseph DPM, Podiatry/Foot and Ankle Surgery   200 mg at 10/28/24 2035    amoxicillin-clavulanate (AUGMENTIN) 500-125 MG per tablet 1 tablet  1 tablet Oral Q24H BHUPENDRA Haley Joseph DPM, Podiatry/Foot and Ankle Surgery   1 tablet at 10/29/24 0836    [Held by provider] aspirin EC tablet 81 mg  81 mg Oral Daily Haley Joseph DPM, Podiatry/Foot and Ankle Surgery   81 mg at 10/28/24 0849    atorvastatin (LIPITOR) tablet 40 mg  40 mg Oral At Bedtime Haley Joseph DPM, Podiatry/Foot and Ankle Surgery   40 mg at 10/28/24 2147    calcitRIOL (ROCALTROL) capsule 0.25 mcg  0.25 mcg Oral At Bedtime Haley Joseph DPM, Podiatry/Foot and Ankle Surgery   0.25 mcg at 10/28/24 2148    carvedilol (COREG) tablet 6.25 mg  6.25 mg Oral At Bedtime Haley Joseph DPM, Podiatry/Foot and Ankle Surgery   6.25 mg at 10/28/24 2147    cinacalcet (SENSIPAR) tablet 60 mg  60 mg Oral Once per day on Monday Wednesday Friday Haley Joseph DPM, Podiatry/Foot and Ankle Surgery   60 mg at 10/28/24 0851    clopidogrel (PLAVIX) tablet 75 mg  75 mg Oral Daily Chai España MD        insulin aspart (NovoLOG) injection (RAPID ACTING)  1-10 Units Subcutaneous TID AC Chai España MD        insulin aspart (NovoLOG) injection (RAPID ACTING)  1-7 Units Subcutaneous At Bedtime Chai España MD        insulin glargine (LANTUS PEN) injection 30 Units  30 Units Subcutaneous At Bedtime  Chai España MD        pantoprazole (PROTONIX) EC tablet 40 mg  40 mg Oral BID Haley Joseph DPM, Podiatry/Foot and Ankle Surgery   40 mg at 10/29/24 0836    polyethylene glycol (MIRALAX) Packet 17 g  17 g Oral Daily Haley Joseph DPM, Podiatry/Foot and Ankle Surgery   17 g at 10/29/24 0836    sacubitril-valsartan (ENTRESTO) 49-51 MG per tablet 1 tablet  1 tablet Oral BID Haley Joseph DPM, Podiatry/Foot and Ankle Surgery   1 tablet at 10/29/24 0837    senna-docusate (SENOKOT-S/PERICOLACE) 8.6-50 MG per tablet 1 tablet  1 tablet Oral BID Haley Joseph DPM, Podiatry/Foot and Ankle Surgery   1 tablet at 10/29/24 0836    sevelamer carbonate (RENVELA) tablet 800 mg  800 mg Oral TID w/meals Haley Joseph DPM, Podiatry/Foot and Ankle Surgery   800 mg at 10/29/24 0837    sodium chloride (PF) 0.9% PF flush 3 mL  3 mL Intracatheter Q8H Haley Joseph DPM, Podiatry/Foot and Ankle Surgery   3 mL at 10/29/24 0837    Warfarin Dose Required Daily - Pharmacist Managed  1 each Oral See Admin Instructions Chai España MD        [Held by provider] Warfarin Dose Required Daily - Pharmacist Managed  1 each Oral See Admin Instructions Cathi Singleton MD            Data   Recent Labs   Lab 10/29/24  0643 10/29/24  0631 10/28/24  2139 10/28/24  2009 10/28/24  0835 10/28/24  0827 10/27/24  0615 10/27/24  0231 10/26/24  1431 10/26/24  0645 10/26/24  0159 10/25/24  2225 10/25/24  1744 10/25/24  1741 10/23/24  0951 10/23/24  0637   WBC  --   --   --   --   --   --   --   --   --  10.4  --   --   --   --   --  6.5   HGB  --   --   --   --   --   --   --   --   --  7.4*  --   --   --  8.9*  --  10.3*   MCV  --   --   --   --   --   --   --   --   --  101*  --   --   --   --   --  100   PLT  --   --   --   --   --  262  --   --   --  245  --  250  --   --   --  264   INR 1.17*  --   --  1.20*  --   --   --   --   --  1.28*  --   --   --   --    < > 1.21*   *  --   --   --   --  136  --  133*  --  135  --   --    --   --    < > 135   POTASSIUM 4.1  --   --   --   --  4.5  --  4.6  --  4.4  --   --   --   --    < > 3.8   CHLORIDE 94*  --   --   --   --  96*  --  94*  --  97*  --   --   --   --    < > 97*   CO2 28  --   --   --   --  28  --  28  --  24  --   --   --   --    < > 27   BUN 50.8*  --   --   --   --  49.7*  --  43.1*  --  41.7*  --   --   --   --    < > 41.7*   CR 7.85*  --   --   --   --  7.97*  --  8.17*  --  7.86*  --   --   --   --    < > 8.13*   ANIONGAP 10  --   --   --   --  12  --  11  --  14  --   --   --   --    < > 11   TERENCE 8.6*  --   --   --   --  9.0  --  9.2  --  9.1  --   --   --   --    < > 8.5*   * 314* 322*  --    < > 255*   < > 361*   < > 340*   < >  --    < >  --    < > 218*   ALBUMIN 2.3*  --   --   --   --  2.4*  --  2.6*  --  2.6*  --   --   --   --    < > 2.1*    < > = values in this interval not displayed.       No results found for this or any previous visit (from the past 24 hours).    Chai España MD  Text Page  (7am to 6pm)

## 2024-10-29 NOTE — PLAN OF CARE
Goal Outcome Evaluation:    Date & Time:10/28/24 3844-3461  Surgery/POD#: 4 Left Femoral Endartectomy, Left Femoral to Tibial peroneal Trunk Bypass w/ Left great sephaneous vein. Wound vacc in place on left groin. Pod 2 Left great toe amputation    Behavior & Aggression: Green  Fall Risk: Yes   Orientation:A&Ox4   ABNL VS/O2: VSS on RA   ABNL Labs: See chart   Pain Management:PRN oxycodone given x2 overnight  Bowel/Bladder: Continent. Peritoneal dialysis patient w/ minimal urine output.   Drains: Wound vac to left groin   Wounds/incisions: LLE incision with dressing CDI. Left toe incision, Left groin incision. Peritoneal dialysis cath infusing overnight.   Diet:2 G sodium diet. Patient spoke w/ MD regarding discontinue mod car diet.   Activity Level: Ax1   Tests/Procedures: Pending   Anticipated  DC Date: Pending   Significant Information: Pulses + w/ doppler.

## 2024-10-30 ENCOUNTER — APPOINTMENT (OUTPATIENT)
Dept: PHYSICAL THERAPY | Facility: CLINIC | Age: 64
DRG: 252 | End: 2024-10-30
Attending: HOSPITALIST
Payer: MEDICARE

## 2024-10-30 LAB
ALBUMIN SERPL BCG-MCNC: 2.4 G/DL (ref 3.5–5.2)
ANION GAP SERPL CALCULATED.3IONS-SCNC: 13 MMOL/L (ref 7–15)
BLD PROD TYP BPU: NORMAL
BLOOD COMPONENT TYPE: NORMAL
BUN SERPL-MCNC: 57.5 MG/DL (ref 8–23)
CALCIUM SERPL-MCNC: 9.2 MG/DL (ref 8.8–10.4)
CHLORIDE SERPL-SCNC: 93 MMOL/L (ref 98–107)
CODING SYSTEM: NORMAL
CREAT SERPL-MCNC: 7.6 MG/DL (ref 0.67–1.17)
CROSSMATCH: NORMAL
EGFRCR SERPLBLD CKD-EPI 2021: 7 ML/MIN/1.73M2
ERYTHROCYTE [DISTWIDTH] IN BLOOD BY AUTOMATED COUNT: 16 % (ref 10–15)
GLUCOSE BLDC GLUCOMTR-MCNC: 106 MG/DL (ref 70–99)
GLUCOSE BLDC GLUCOMTR-MCNC: 177 MG/DL (ref 70–99)
GLUCOSE BLDC GLUCOMTR-MCNC: 232 MG/DL (ref 70–99)
GLUCOSE BLDC GLUCOMTR-MCNC: 313 MG/DL (ref 70–99)
GLUCOSE BLDC GLUCOMTR-MCNC: 340 MG/DL (ref 70–99)
GLUCOSE SERPL-MCNC: 301 MG/DL (ref 70–99)
HCO3 SERPL-SCNC: 28 MMOL/L (ref 22–29)
HCT VFR BLD AUTO: 20.6 % (ref 40–53)
HGB BLD-MCNC: 7.1 G/DL (ref 13.3–17.7)
INR PPP: 1.18 (ref 0.85–1.15)
ISSUE DATE AND TIME: NORMAL
MCH RBC QN AUTO: 33.6 PG (ref 26.5–33)
MCHC RBC AUTO-ENTMCNC: 34.5 G/DL (ref 31.5–36.5)
MCV RBC AUTO: 98 FL (ref 78–100)
PHOSPHATE SERPL-MCNC: 5 MG/DL (ref 2.5–4.5)
PLATELET # BLD AUTO: 311 10E3/UL (ref 150–450)
POTASSIUM SERPL-SCNC: 4.3 MMOL/L (ref 3.4–5.3)
RBC # BLD AUTO: 2.11 10E6/UL (ref 4.4–5.9)
SODIUM SERPL-SCNC: 134 MMOL/L (ref 135–145)
UFH PPP CHRO-ACNC: 0.22 IU/ML
UFH PPP CHRO-ACNC: 0.3 IU/ML
UFH PPP CHRO-ACNC: 0.34 IU/ML
UNIT ABO/RH: NORMAL
UNIT NUMBER: NORMAL
UNIT STATUS: NORMAL
UNIT TYPE ISBT: 5100
WBC # BLD AUTO: 10.8 10E3/UL (ref 4–11)

## 2024-10-30 PROCEDURE — 90945 DIALYSIS ONE EVALUATION: CPT

## 2024-10-30 PROCEDURE — 99232 SBSQ HOSP IP/OBS MODERATE 35: CPT | Performed by: INTERNAL MEDICINE

## 2024-10-30 PROCEDURE — 85520 HEPARIN ASSAY: CPT | Performed by: INTERNAL MEDICINE

## 2024-10-30 PROCEDURE — 80069 RENAL FUNCTION PANEL: CPT | Performed by: PODIATRIST

## 2024-10-30 PROCEDURE — 36415 COLL VENOUS BLD VENIPUNCTURE: CPT | Performed by: INTERNAL MEDICINE

## 2024-10-30 PROCEDURE — 250N000013 HC RX MED GY IP 250 OP 250 PS 637: Performed by: INTERNAL MEDICINE

## 2024-10-30 PROCEDURE — 97110 THERAPEUTIC EXERCISES: CPT | Mod: GP

## 2024-10-30 PROCEDURE — 120N000001 HC R&B MED SURG/OB

## 2024-10-30 PROCEDURE — 85049 AUTOMATED PLATELET COUNT: CPT | Performed by: INTERNAL MEDICINE

## 2024-10-30 PROCEDURE — 250N000013 HC RX MED GY IP 250 OP 250 PS 637

## 2024-10-30 PROCEDURE — P9016 RBC LEUKOCYTES REDUCED: HCPCS | Performed by: INTERNAL MEDICINE

## 2024-10-30 PROCEDURE — 250N000011 HC RX IP 250 OP 636: Performed by: PODIATRIST

## 2024-10-30 PROCEDURE — 97530 THERAPEUTIC ACTIVITIES: CPT | Mod: GP

## 2024-10-30 PROCEDURE — 250N000013 HC RX MED GY IP 250 OP 250 PS 637: Performed by: PODIATRIST

## 2024-10-30 PROCEDURE — 85014 HEMATOCRIT: CPT | Performed by: INTERNAL MEDICINE

## 2024-10-30 PROCEDURE — 85610 PROTHROMBIN TIME: CPT | Performed by: INTERNAL MEDICINE

## 2024-10-30 RX ORDER — TORSEMIDE 100 MG/1
100 TABLET ORAL DAILY
Status: DISCONTINUED | OUTPATIENT
Start: 2024-10-30 | End: 2024-11-09 | Stop reason: HOSPADM

## 2024-10-30 RX ORDER — WARFARIN SODIUM 2 MG/1
6 TABLET ORAL
Status: COMPLETED | OUTPATIENT
Start: 2024-10-30 | End: 2024-10-30

## 2024-10-30 RX ADMIN — CLOPIDOGREL BISULFATE 75 MG: 75 TABLET ORAL at 08:02

## 2024-10-30 RX ADMIN — SACUBITRIL AND VALSARTAN 1 TABLET: 49; 51 TABLET, FILM COATED ORAL at 21:31

## 2024-10-30 RX ADMIN — PANTOPRAZOLE SODIUM 40 MG: 40 TABLET, DELAYED RELEASE ORAL at 08:02

## 2024-10-30 RX ADMIN — PANTOPRAZOLE SODIUM 40 MG: 40 TABLET, DELAYED RELEASE ORAL at 21:31

## 2024-10-30 RX ADMIN — ACETAMINOPHEN 975 MG: 325 TABLET, FILM COATED ORAL at 03:27

## 2024-10-30 RX ADMIN — CALCITRIOL CAPSULES 0.25 MCG 0.25 MCG: 0.25 CAPSULE ORAL at 21:31

## 2024-10-30 RX ADMIN — WARFARIN SODIUM 6 MG: 2 TABLET ORAL at 17:20

## 2024-10-30 RX ADMIN — SEVELAMER CARBONATE 800 MG: 800 TABLET, FILM COATED ORAL at 11:39

## 2024-10-30 RX ADMIN — SEVELAMER CARBONATE 800 MG: 800 TABLET, FILM COATED ORAL at 08:02

## 2024-10-30 RX ADMIN — ATORVASTATIN CALCIUM 40 MG: 40 TABLET, FILM COATED ORAL at 21:31

## 2024-10-30 RX ADMIN — SENNOSIDES AND DOCUSATE SODIUM 2 TABLET: 50; 8.6 TABLET ORAL at 21:31

## 2024-10-30 RX ADMIN — CARVEDILOL 6.25 MG: 6.25 TABLET, FILM COATED ORAL at 21:31

## 2024-10-30 RX ADMIN — SEVELAMER CARBONATE 800 MG: 800 TABLET, FILM COATED ORAL at 17:20

## 2024-10-30 RX ADMIN — HEPARIN SODIUM 1300 UNITS/HR: 10000 INJECTION, SOLUTION INTRAVENOUS at 08:12

## 2024-10-30 RX ADMIN — TORSEMIDE 100 MG: 100 TABLET ORAL at 13:12

## 2024-10-30 RX ADMIN — ALLOPURINOL 200 MG: 100 TABLET ORAL at 21:31

## 2024-10-30 RX ADMIN — POLYETHYLENE GLYCOL 3350 17 G: 17 POWDER, FOR SOLUTION ORAL at 08:02

## 2024-10-30 RX ADMIN — SACUBITRIL AND VALSARTAN 1 TABLET: 49; 51 TABLET, FILM COATED ORAL at 08:05

## 2024-10-30 RX ADMIN — CINACALCET 60 MG: 30 TABLET ORAL at 08:05

## 2024-10-30 RX ADMIN — SENNOSIDES AND DOCUSATE SODIUM 2 TABLET: 50; 8.6 TABLET ORAL at 08:01

## 2024-10-30 NOTE — PLAN OF CARE
Date/Time: 10/29/24 8901-9063     Diagnosis: gangrene of L great toe.  POD#: 4 from L femoral endartectomy, L femoral tibial peroneal trunk bypass w/L great sephaneous vein.  POD#: 2 from L great toe amputation.  Orientation: A&Ox4  Behavior & Aggression: green  Activity: SBA, quad cane  Diet: 2 g Na  Fall risk: yes  Isolation: N/A  Pain: denies  B&B: continent, no BM this shift; endorses constipation; sched stool softener given.  VS/O2: VSS, RA. Slightly tachy.  IV: R PIV x2 & L PIV infusing heparin @ 1150 units/hr; other SL.   Drains/Devices: LLQ PD site; wound vac to L groin. LLE incision with dressing, CDI.  Tele: N/A  Abnormal Labs: hep Xa draw @ in AM.  Skin: Dusky in color. LLE great toe black w/ dry scab gangrenous, red/luis streaking extending up foot and into shin. Dry/flaky heels/feet, scattered bruising.  Consults/Procedures:N/A  D/C Date: TBD  Other: has nightly peritoneal dialysis hooked up. Pulses w/doppler.

## 2024-10-30 NOTE — PROGRESS NOTES
"Vascular Surgery Progress Note    Patient seen this am, no acute issues overnight     Exam:   Afebrile, HR 90s, BP 140s/90s  Awake, alert, nad  Nlb on RA  LLE dressings removed today - incisions on lower leg clean, dry, in tact.   Biphasic doppler signals LLE DP and PT as well as in graft.     Wound vac removed from left groin, sutures tied down.   Labs: hgb 7.1 - post op anemia, ctm    A/P \"  64yom s/p L femoral to TP trunk bypass on 10/25 w/ partially translocated in situ GSV, ligation of side branches on 10/27 who is doing well postoperatively. Retention sutures tied today, 10/30, with removal of wound vac.     - OK to continue regular diet  - Continue cms/doppler checks  - Plan for pt to discharge on plavix/wafarin   - Continue bridging warfarin w/ heparin  - recheck hgb in am - ctm, do not suspect active bleeding.   - continue to encourage IS, up OOB   - OK for discharge to U     Kaiser Gamble MD  Vascular Surgery PGY4  To reach vascular surgery [place] team on call, please go to [facility] and from the drop down find the following:   VASCULAR SURGERY/[facility] [facility]  Then page the person listed to the right of day/night call. Can click the pager to page.         "

## 2024-10-30 NOTE — PROGRESS NOTES
Care Management Follow Up    Length of Stay (days): 15    Expected Discharge Date: 11/01/2024     Concerns to be Addressed: discharge planning  wants PCP f/up scheduled and need to check if [facility] can change to smaller PD bags  Patient plan of care discussed at interdisciplinary rounds: Yes    Anticipated Discharge Disposition: Skilled Nursing Facility that accepts Peritoneal dialysis      Anticipated Discharge Services: Other (see comment) (Patient is planning to hire a private duty PCA)  Anticipated Discharge DME: None    Patient/family educated on Medicare website which has current facility and service quality ratings:    Education Provided on the Discharge Plan: Other (see comments) (ongoing)  Patient/Family in Agreement with the Plan: yes    Referrals Placed by CM/SW: Internal Clinic Care Coordination, Specialty Providers, Scheduled Follow-up appointments  Private pay costs discussed: Not applicable    Discussed  Partnership in Safe Discharge Planning  document with patient/family: Yes:     Handoff Completed: No, handoff not indicated or clinically appropriate    Additional Information:  Writer consulted with Dr Guzman for discharge planning, he suggested [facility] in [facility], for TCU that can manage peritoneal dialysis. [facility] is a [place] facility and patient will need to switch to [facility] per their contract. Writer checking with Paula Klein liaison regarding availability of [facility] versus [facility].      Next Steps: Switch to [facility] for TCU discharge     KATELYN Duarte

## 2024-10-30 NOTE — PROGRESS NOTES
VSS. A/O. Up-1. L groin vac removed, some drainage, dressing changed, top part of incision is little open. L leg (bruises) & foot dressing changed. Doppler pulses. Denies pain. 1 unit RBC given. Tolerating diet. Voiding. Pending TCU placement. PD started. Hep gtt cont, 10a check am.

## 2024-10-30 NOTE — PROGRESS NOTES
Care Management Follow Up    Length of Stay (days): 15    Expected Discharge Date: 10/31/2024     Concerns to be Addressed: discharge planning  wants PCP f/up scheduled and need to check if [facility] can change to smaller PD bags  Patient plan of care discussed at interdisciplinary rounds: Yes    Anticipated Discharge Disposition: Skilled Nursing Facility      Anticipated Discharge Services: Other (see comment) (Patient is planning to hire a private duty PCA)  Anticipated Discharge DME: None    Patient/family educated on Medicare website which has current facility and service quality ratings:    Education Provided on the Discharge Plan: Other (see comments) (ongoing)  Patient/Family in Agreement with the Plan: yes    Referrals Placed by CM/SW: Internal Clinic Care Coordination, Specialty Providers, Scheduled Follow-up appointments  Private pay costs discussed: Not applicable    Discussed  Partnership in Safe Discharge Planning  document with patient/family: Yes:     Handoff Completed: No, handoff not indicated or clinically appropriate    Additional Information:  Writer sent Nadir with [facility] a message regarding the peritoneal dialysis to see if they can accept patient with that    ADD - sent additional referrals. Have a list of facilities that accept peritoneal dialysis, do not know if any are near the [facility], patient may need to review choices of facilities closer to the [facility]. List provided to patient        Next Steps: Secure placement, Teresa MAE LSW

## 2024-10-30 NOTE — PROGRESS NOTES
Cycler set up per protocol and per treatment orders     Results from previous treatment:  Effluent color and clarity: Yellow and clear, no fibrin   Total UF: 805 ml  Initial Drain: 52 ml  Avg Dwell: 1:12  Lost Dwell: 0:50    Cycler Serial Number: [id number]  Casette:  lot# [id number], Exp: 27  Total Treatment Volume: 20276 mL  Total treatment time: 9 hrs  Fill Volume: 2000 ml  Last Fill Volume:0 ml  Heater ba.5% 6000mL;  Lot #: [id number];  Expiration Date: 27  Side Bag #1: 2.5% 6000mL;  Lot #: [id number];  Expiration Date: 27    Number of cycles including final fill: 5  Dwell Time: 1:22 minutes  Last Fill Volume: 0 ml  Initial Drain Alarm: 10 ml  PD orders reviewed with Patient procedure and ESRD teaching done and questions answered.  Cycler ready for hook-up.    Report given to: Jose JIMENEZ RN

## 2024-10-30 NOTE — PLAN OF CARE
Goal Outcome Evaluation:    Date & Time:10/29/24 6734-8704  Surgery/POD#: 5 Left Femoral Endartectomy, Left Femoral to Tibial peroneal Trunk Bypass w/ Left great sephaneous vein. Wound vacc in place on left groin. Pod 2 Left great toe amputation    Behavior & Aggression: Green  Fall Risk: Yes   Orientation:A&Ox4   ABNL VS/O2: VSS on RA   ABNL Labs: See chart   Pain Management: scheduled tylenol overnight   Bowel/Bladder: Continent. Peritoneal dialysis patient   Drains: Wound vac to left groin   Wounds/incisions: LLE incision with dressing CDI. Left toe incision, Left groin incision. Peritoneal dialysis cath infusing overnight.   Diet:2 G sodium diet.   Activity Level: Ax1   Tests/Procedures: Pending   Anticipated  DC Date: Pending   Significant Information: Pulses + w/ doppler.

## 2024-10-31 LAB
ALBUMIN SERPL BCG-MCNC: 2.2 G/DL (ref 3.5–5.2)
ANION GAP SERPL CALCULATED.3IONS-SCNC: 10 MMOL/L (ref 7–15)
BUN SERPL-MCNC: 55.6 MG/DL (ref 8–23)
CALCIUM SERPL-MCNC: 9.3 MG/DL (ref 8.8–10.4)
CHLORIDE SERPL-SCNC: 94 MMOL/L (ref 98–107)
CREAT SERPL-MCNC: 7.56 MG/DL (ref 0.67–1.17)
EGFRCR SERPLBLD CKD-EPI 2021: 7 ML/MIN/1.73M2
ERYTHROCYTE [DISTWIDTH] IN BLOOD BY AUTOMATED COUNT: 17.3 % (ref 10–15)
GLUCOSE BLDC GLUCOMTR-MCNC: 151 MG/DL (ref 70–99)
GLUCOSE BLDC GLUCOMTR-MCNC: 161 MG/DL (ref 70–99)
GLUCOSE BLDC GLUCOMTR-MCNC: 162 MG/DL (ref 70–99)
GLUCOSE BLDC GLUCOMTR-MCNC: 174 MG/DL (ref 70–99)
GLUCOSE SERPL-MCNC: 215 MG/DL (ref 70–99)
HCO3 SERPL-SCNC: 28 MMOL/L (ref 22–29)
HCT VFR BLD AUTO: 24.2 % (ref 40–53)
HGB BLD-MCNC: 8.5 G/DL (ref 13.3–17.7)
INR PPP: 1.69 (ref 0.85–1.15)
MCH RBC QN AUTO: 33.1 PG (ref 26.5–33)
MCHC RBC AUTO-ENTMCNC: 35.1 G/DL (ref 31.5–36.5)
MCV RBC AUTO: 94 FL (ref 78–100)
PATH REPORT.COMMENTS IMP SPEC: NORMAL
PATH REPORT.COMMENTS IMP SPEC: NORMAL
PATH REPORT.FINAL DX SPEC: NORMAL
PATH REPORT.GROSS SPEC: NORMAL
PATH REPORT.MICROSCOPIC SPEC OTHER STN: NORMAL
PATH REPORT.RELEVANT HX SPEC: NORMAL
PHOSPHATE SERPL-MCNC: 5 MG/DL (ref 2.5–4.5)
PHOTO IMAGE: NORMAL
PLATELET # BLD AUTO: 317 10E3/UL (ref 150–450)
POTASSIUM SERPL-SCNC: 4 MMOL/L (ref 3.4–5.3)
RBC # BLD AUTO: 2.57 10E6/UL (ref 4.4–5.9)
SODIUM SERPL-SCNC: 132 MMOL/L (ref 135–145)
UFH PPP CHRO-ACNC: 0.3 IU/ML
WBC # BLD AUTO: 13.1 10E3/UL (ref 4–11)

## 2024-10-31 PROCEDURE — 99232 SBSQ HOSP IP/OBS MODERATE 35: CPT | Performed by: INTERNAL MEDICINE

## 2024-10-31 PROCEDURE — 90945 DIALYSIS ONE EVALUATION: CPT

## 2024-10-31 PROCEDURE — 120N000001 HC R&B MED SURG/OB

## 2024-10-31 PROCEDURE — 85520 HEPARIN ASSAY: CPT | Performed by: INTERNAL MEDICINE

## 2024-10-31 PROCEDURE — 85014 HEMATOCRIT: CPT | Performed by: INTERNAL MEDICINE

## 2024-10-31 PROCEDURE — 36415 COLL VENOUS BLD VENIPUNCTURE: CPT | Performed by: PODIATRIST

## 2024-10-31 PROCEDURE — 80069 RENAL FUNCTION PANEL: CPT | Performed by: PODIATRIST

## 2024-10-31 PROCEDURE — 250N000013 HC RX MED GY IP 250 OP 250 PS 637: Performed by: PODIATRIST

## 2024-10-31 PROCEDURE — 85041 AUTOMATED RBC COUNT: CPT | Performed by: INTERNAL MEDICINE

## 2024-10-31 PROCEDURE — 88311 DECALCIFY TISSUE: CPT | Mod: 26 | Performed by: PATHOLOGY

## 2024-10-31 PROCEDURE — 250N000013 HC RX MED GY IP 250 OP 250 PS 637: Performed by: INTERNAL MEDICINE

## 2024-10-31 PROCEDURE — 88305 TISSUE EXAM BY PATHOLOGIST: CPT | Mod: 26 | Performed by: PATHOLOGY

## 2024-10-31 PROCEDURE — 85610 PROTHROMBIN TIME: CPT | Performed by: INTERNAL MEDICINE

## 2024-10-31 PROCEDURE — 250N000011 HC RX IP 250 OP 636: Performed by: PODIATRIST

## 2024-10-31 RX ORDER — WARFARIN SODIUM 2 MG/1
6 TABLET ORAL
Status: COMPLETED | OUTPATIENT
Start: 2024-10-31 | End: 2024-10-31

## 2024-10-31 RX ADMIN — TORSEMIDE 100 MG: 100 TABLET ORAL at 08:43

## 2024-10-31 RX ADMIN — CLOPIDOGREL BISULFATE 75 MG: 75 TABLET ORAL at 08:43

## 2024-10-31 RX ADMIN — POLYETHYLENE GLYCOL 3350 17 G: 17 POWDER, FOR SOLUTION ORAL at 08:43

## 2024-10-31 RX ADMIN — PANTOPRAZOLE SODIUM 40 MG: 40 TABLET, DELAYED RELEASE ORAL at 08:43

## 2024-10-31 RX ADMIN — SEVELAMER CARBONATE 800 MG: 800 TABLET, FILM COATED ORAL at 17:54

## 2024-10-31 RX ADMIN — OXYCODONE HYDROCHLORIDE 5 MG: 5 TABLET ORAL at 23:07

## 2024-10-31 RX ADMIN — WARFARIN SODIUM 6 MG: 2 TABLET ORAL at 17:54

## 2024-10-31 RX ADMIN — CARVEDILOL 6.25 MG: 6.25 TABLET, FILM COATED ORAL at 21:12

## 2024-10-31 RX ADMIN — SEVELAMER CARBONATE 800 MG: 800 TABLET, FILM COATED ORAL at 13:27

## 2024-10-31 RX ADMIN — SACUBITRIL AND VALSARTAN 1 TABLET: 49; 51 TABLET, FILM COATED ORAL at 21:13

## 2024-10-31 RX ADMIN — HEPARIN SODIUM 1300 UNITS/HR: 10000 INJECTION, SOLUTION INTRAVENOUS at 22:15

## 2024-10-31 RX ADMIN — SACUBITRIL AND VALSARTAN 1 TABLET: 49; 51 TABLET, FILM COATED ORAL at 08:43

## 2024-10-31 RX ADMIN — CALCITRIOL CAPSULES 0.25 MCG 0.25 MCG: 0.25 CAPSULE ORAL at 21:13

## 2024-10-31 RX ADMIN — ACETAMINOPHEN 650 MG: 325 TABLET, FILM COATED ORAL at 21:12

## 2024-10-31 RX ADMIN — PANTOPRAZOLE SODIUM 40 MG: 40 TABLET, DELAYED RELEASE ORAL at 21:12

## 2024-10-31 RX ADMIN — ALLOPURINOL 200 MG: 100 TABLET ORAL at 21:12

## 2024-10-31 RX ADMIN — ATORVASTATIN CALCIUM 40 MG: 40 TABLET, FILM COATED ORAL at 21:12

## 2024-10-31 RX ADMIN — HEPARIN SODIUM 1300 UNITS/HR: 10000 INJECTION, SOLUTION INTRAVENOUS at 03:35

## 2024-10-31 RX ADMIN — SEVELAMER CARBONATE 800 MG: 800 TABLET, FILM COATED ORAL at 08:43

## 2024-10-31 NOTE — PROGRESS NOTES
Care Management Follow Up    Length of Stay (days): 16    Expected Discharge Date: 11/01/2024     Concerns to be Addressed: discharge planning  wants PCP f/up scheduled and need to check if [facility] can change to smaller PD bags  Patient plan of care discussed at interdisciplinary rounds: Yes    Anticipated Discharge Disposition: Skilled Nursing Facility     Anticipated Discharge Services: Other (see comment) (Patient is planning to hire a private duty PCA)  Anticipated Discharge DME: None    Patient/family educated on Medicare website which has current facility and service quality ratings:    Education Provided on the Discharge Plan: Other (see comments) (ongoing)  Patient/Family in Agreement with the Plan: yes    Referrals Placed by CM/SW: Internal Clinic Care Coordination, Specialty Providers, Scheduled Follow-up appointments  Private pay costs discussed: Not applicable    Discussed  Partnership in Safe Discharge Planning  document with patient/family: No     Handoff Completed: No, handoff not indicated or clinically appropriate    Additional Information:  Per notes from 10/30 a [facility] facility would require patient to switch to a [facility] unit. Writer called [facility] admissions to review process. Patient would be considered a new dialysis patient and a full admission would need to be requested. Inquired about the peritoneal dialysis and currently would need to be a center decision as admissions is not accepting new peritoneal dialysis patients due to the nationwide [place] shortage on solutions.     [id number]  Writer emailed  for [email or web address] to determine if there are [facility] contracted TCU's in the area. Awaiting response back.     Next Steps: awaiting TCU location to help in determining need for dialysis unit switch    Calli Hyman RN   [facility]   Phone [phone], [employer] or [phone]

## 2024-10-31 NOTE — PROGRESS NOTES
VSS. A/O. Up-1/walker. R groin moist drainage, dressing changed x2. L leg incisions dressing changed. L foot dressing CDI. Doppler pulses. Scrotum & L leg edema. PD dressing CDI. Hep gtt cont, 10a recheck am. Voiding via urinal. Denies pain. Coccyx mapilex, skin intact. Had a bm

## 2024-10-31 NOTE — PLAN OF CARE
Goal Outcome Evaluation:      Plan of Care Reviewed With: patient    Overall Patient Progress: no changeOverall Patient Progress: no change     Date & Time:10/30/24 [id number]  Surgery/POD#: 6 Left Femoral Endartectomy, Left Femoral to Tibial peroneal Trunk Bypass w/ Left great sephaneous vein. Pod 3 Left great toe amputation    Behavior & Aggression: Green  Fall Risk: Yes   Orientation:A&Ox4   ABNL VS/O2: VSS on RA exc tachy at times  ABNL Labs: See chart   Pain Management: Minimal pain this shift   Bowel/Bladder: Continent. Peritoneal dialysis patient.   Drains: Wound vac to L groin was removed on 10/30, dressing to site with small drainage, unchanged during shift.    Wounds/incisions: LLE incision with dressing small drainage. Left toe incision, Left groin incision. Peritoneal dialysis cath infusing overnight, PD machine restarted per [facility] hotline instructions.   Diet: 2G sodium diet.   Activity Level: Ax1, not oob this shift.    Tests/Procedures: Pending   Anticipated  DC Date: Pending   Significant Information: Pulses + w/ doppler.

## 2024-10-31 NOTE — PROGRESS NOTES
[facility]  Hospitalist Progress Note  Chai España MD  10/31/2024    Assessment & Plan   Mr. Arnulfo Pritchett is a 64 yo male with Afib on warfarin, GERD, gout, insomnia, ESRD on PD, HFrEF, HTN, IDDM, diabetic peripheral neuropathy, CAD s/p stent placement x 3 (last 2021), TALI, HLD, and RLE PAD s/p balloon angio and stenting of SFA/popliteal arteries presented to [facility] ED with worsening pain and redness in left toe with streaking up the leg  for the last 2 days with throbbing pain.       Worsening L great toe wound    Ischemic left toe   10/27 s/p amputation of left great toe   Presented to OSH with 2 day history of erythema and streaking redness going up his leg with associated increase pain and throbbing noted. No fever but both CRP 55 and ESR 86 are elevated.   - XR of left foot show:Soft tissue swelling great toe. There is subtle cortical  irregularity and probable erosive change along the distal phalanx/tuft  of the great toe which does raise concern for osteomyelitis.     Obtained MRI foot ->  Distal phalanx of the first digit appears pointed distally with replacement of normal fatty marrow and a soft tissue defect at the distal tuft. About 14 mm of the distal phalanx appear normal with normal fat signal and without significant edema. Constellation of findings favors gangrene.  Podiatry consultation -> will need at least a partial left hallux amputation, once/if vascular status is optimized.   - 10/22 ID consult: Discontinue Zosyn. (Suspected discoloration from ischemia and not cellulitis) changed to oral Augmentin until bypass surgery.  - recommendations from  ID as well: On Augmentin 1 tablet daily every 24 completed 7 days.  - 10/27 s/p amputation of left great toe, podiatry has signed off with wound dressing orderes and follow up after discharge with Dr Joseph.        ## Hx of acute RLE arterial occlusion s/p SFA popliteal balloon angioplasty and stenting, 5/6/2024 (on  warfarin and plavix)   10/27 taken back to the OR for critical limb ischemia of the left lower extremity.  Angiogram showing A prominent branch from GSV going to the deep system causing AVF. After ligation, good antegrade flow to the foot with AT dominant runoff which collateralizes around the ankle to go to PT   Vascular surgery consultation  IR consulted -> 10/17 CTA abdomen/pelvis runoff for further evaluation of disease and to guide procedure planning.    - Right leg with no inflow or femoral-popliteal segment obstructions.  Occluded anterior tibial artery origin with proximal reconstruction without distal stenosis.Normal caliber peroneal artery. Tandem severe stenosis of the distal posterior tibial artery.   - Left leg: No inflow obstruction. Focal calcified severe stenosis of the proximal superficial femoral artery. Long segment occlusion of the mid to distal popliteal artery with low attenuation changes which may represent thrombus or soft  atherosclerotic plaque. Reconstitution of the origins of the posterior tibial arteries. Severe tandem stenoses of the distal posterior tibial artery  10/20 INR to 1.7 (started on heparin drip)   10/20 as per vascular note planning for left femoral to below the knee popliteal in situ bypass graft with embolectomy.   Patient is not a candidate for attempted endovascular thrombectomy since high likelihood of embolization per prior discussions   10/25 left femoral endarterectomy, left femoral to tibioperoneal bypass with left great saphenous vein with wound VAC placement.  10/26 follow-up with vascular surgery starting of heparin  10/27 taken back to the OR more urgently:  A prominent branch from GSV going to the deep system causing AVF. After ligation, good antegrade flow to the foot with AT dominant runoff which collateralizes around the ankle to go to PT .   10/27 patient  seen post operative. Comfortable. Starting back on heparin per vascular surgery   10/28 on heparin  drip with vascular ok to resume anticoagulation with previous anticoagulation.   He is on ASA in hospital but on plavix PTA. On warfarin prior to admit.   Confirm with vascular surgery what can cross over anticoagulation.  Restart plavix 10/29 and coumadin. Will defer to vascular surgery about need for bridging or for 2nd antiplatelet   10/31 Patient will need to be bridged with warfarin while on heparin, INR 1.6     Pain control  -Patient wanted low-dose pain meds for pain control  -Initially 10/22 discussion about oxycodone or Dilaudid.  He received oxy but wants to do Dilaudid  -Changed to oral solution of Dilaudid as cannot cut the pill in for starting at 0.5 every 6 as needed  -Increase bowel regimen as he feels he might get constipated  -back to oxycodone now PRN     ## Chronic paroxysmal afib s/p atrial ablation, 6/2023  ## HFrEF 2/2 ICM  ## HLD  ## HTN  ## Hx of CAD s/p ALESIA placement (last 2021)  PTA now warfarin and Plavix instead of apixaban as above, as well as Entresto, Coreg, torsemide and statin.  Most recent EF last month on 6/4 with severely decrease LV function with est EF 20-25%.    Noted blood pressure soft 90/70s -> BPs now normalized  Decrease Coreg from 12.5 mg BID to 6.25 mg BID  Holding Torsemide  continue Entresto  continue statin  10/22 Isabell scan abnormal.  No evidence of ischemia.  Medium sized area of infarction in the entire inferolateral left ventricle extending into the inferolateral segment of left ventricle and basal segment.  Small area of nontransmural infarction apex.  EF 26  10/23 cardiology follow-up no ischemia.  Old infarcts.  No high risk findings.  High risk for volume overload but okay to proceed    ## Anemia  ## Acute on chronic   Patient at admission 10.9 and with gradual drop in Hgb in setting of bypass surgery.    Hgb 10.3 > 7.4 > 5.9  Transfuse 1 unit of pRBC and recheck  Follow up Hgb is 7.1, in setting of aggressive ongoing anticoagulation with bridging will  transfuse for reserve        ## ESRD on PD 2/2 DM nephropathy  ## Secondary hyperparathyroidism  ## Hyperkalemia  PTA on nightly PD of which he has been tolerating. Access RLQ double cuffed coiled PD catheter placed 4/16/2021.   - Nephrology consulted for PD planning  - Continue RRT medications below  - Continue calcitriol, cinacalcet and sevelamer carbonate  - Continue every other day gentamicin cream to PD cath side  - Lokelma for hyperkalemia   - management per nephrology   - Torsemide resumed     ## Hx of RCC s/p R nephrectomy, 5/2022  -  CTA with 4 mm hyperenhancing nodule in lower pole of the left kidney   - follow up evaluation   - patient is aware of this finding and will follow-up with his urologist after discharge     ## DM type II  ## Diabetic neuropathy  Mod cho diet  high intensity sliding scale insulin  Last A1c 5.9 7/9/24  -10/24 blood sugars reviewed 169 - 147  -10/26 initially on low sodium diet but needed carb diet   - 10/28 blood sugars higher today but appears he was given 4 mg dexamethasone for surgery.   - 10/28 BG elevated, change to high intensity sliding scale insulin  - 10/29 increase to 35 units at bedtime and additional 10 units in AM     Constipation:   -Struggles with constipation on MiraLAX daily  -Added Dulcolax suppository scheduled  -Senna twice daily     # Other chronic, stable medical conditions:  # Hx of gout: Continue PTA allopurinol  # TALI: Intolerant to nasal CPAP   # GERD: Continue daily PPI  # Insomnia: Continue PTA melatonin.         Diet:  2 gm NA Diet    DVT Prophylaxis: heparin drip   Rosario Catheter: Not present  Lines: None     Cardiac Monitoring: None  Code Status: Full Code          Clinically Significant Risk Factors         # Hyponatremia: Lowest Na = 133 mmol/L in last 2 days, will monitor as appropriate  # Hypochloremia: Lowest Cl = 94 mmol/L in last 2 days, will monitor as appropriate      # Hypoalbuminemia: Lowest albumin = 2 g/dL at 10/25/2024  6:08 AM, will  monitor as appropriate     # Coagulation Defect: INR = 1.28 (Ref range: 0.85 - 1.15) and/or PTT = N/A, will monitor for bleeding    # Hypertension: Noted on problem list                # Financial/Environmental Concerns: none         Disposition Plan     Medically Ready for Discharge:   -- therapy recommending TCU    Disposition:     Interval History   -- Hgb improved with transfusion  -- noted elevated BG, discussed with nephrology    -Data reviewed today: I reviewed all new labs and imaging over the last 24 hours. I personally reviewed no images or EKG's today.    Physical Exam    , Blood pressure 124/88, pulse 105, temperature 98.2  F (36.8  C), temperature source Oral, resp. rate 16, weight 79.8 kg (175 lb 14.8 oz), SpO2 96%.  Vitals:    10/28/24 0900 10/29/24 0633 10/30/24 0448   Weight: 73.1 kg (161 lb 2.5 oz) 76.7 kg (169 lb 1.5 oz) 79.8 kg (175 lb 14.8 oz)     Vital Signs with Ranges  Temp:  [97.7  F (36.5  C)-98.2  F (36.8  C)] 98.2  F (36.8  C)  Pulse:  [101-107] 105  Resp:  [16-17] 16  BP: (116-124)/(81-88) 124/88  SpO2:  [96 %-100 %] 96 %  I/O's Last 24 hours  I/O last 3 completed shifts:  In: 600 [P.O.:600]  Out: 300 [Urine:300]    Constitutional: Awake, alert, cooperative, no apparent distress  Respiratory: Clear to auscultation bilaterally, no crackles or wheezing  Cardiovascular: Regular rate and rhythm, normal S1 and S2, and no murmur noted  GI: Normal bowel sounds, soft, non-distended, non-tender, PD catheter  Skin/Integumen: No rashes, no cyanosis, ++ LLE edema, foot dressing  Other:      Medications   All medications were reviewed.  Current Facility-Administered Medications   Medication Dose Route Frequency Provider Last Rate Last Admin    dianeal PD LOW calcium-2.5% dex (calcium 2.5 mEq/L) 6,000 mL PERITONEAL DIALYSATE   Dialysis [facility] Supplied PD Vega Guzman MD        heparin 25,000 units in 0.45% NaCl 250 mL ANTICOAGULANT infusion  0-5,000 Units/hr Intravenous Continuous Haley Joseph,  ANGELITA, Podiatry/Foot and Ankle Surgery 13 mL/hr at 10/31/24 0335 1,300 Units/hr at 10/31/24 0335    Patient is already receiving anticoagulation with heparin, enoxaparin (LOVENOX), warfarin (COUMADIN)  or other anticoagulant medication   Does not apply Continuous PRN Haley Joseph DPM, Podiatry/Foot and Ankle Surgery         Current Facility-Administered Medications   Medication Dose Route Frequency Provider Last Rate Last Admin    allopurinol (ZYLOPRIM) tablet 200 mg  200 mg Oral QPM Haley Joseph DPM, Podiatry/Foot and Ankle Surgery   200 mg at 10/30/24 2131    atorvastatin (LIPITOR) tablet 40 mg  40 mg Oral At Bedtime Haley Joseph DPM, Podiatry/Foot and Ankle Surgery   40 mg at 10/30/24 2131    calcitRIOL (ROCALTROL) capsule 0.25 mcg  0.25 mcg Oral At Bedtime Haley Joseph DPM, Podiatry/Foot and Ankle Surgery   0.25 mcg at 10/30/24 2131    carvedilol (COREG) tablet 6.25 mg  6.25 mg Oral At Bedtime Haley Joseph DPM, Podiatry/Foot and Ankle Surgery   6.25 mg at 10/30/24 2131    cinacalcet (SENSIPAR) tablet 60 mg  60 mg Oral Once per day on Monday Wednesday Friday Haley Joseph DPM, Podiatry/Foot and Ankle Surgery   60 mg at 10/30/24 0805    clopidogrel (PLAVIX) tablet 75 mg  75 mg Oral Daily Chai España MD   75 mg at 10/31/24 0843    insulin aspart (NovoLOG) injection (RAPID ACTING)  1-10 Units Subcutaneous TID  Chai España MD   2 Units at 10/31/24 1327    insulin aspart (NovoLOG) injection (RAPID ACTING)  1-7 Units Subcutaneous At Bedtime Chai España MD   2 Units at 10/30/24 2133    insulin glargine (LANTUS PEN) injection 10 Units  10 Units Subcutaneous QAM AC Vega Guzman MD   10 Units at 10/31/24 0844    insulin glargine (LANTUS PEN) injection 30 Units  30 Units Subcutaneous At Bedtime Chai España MD   30 Units at 10/30/24 9918    pantoprazole (PROTONIX) EC tablet 40 mg  40 mg Oral BID Haley Joseph DPM, Podiatry/Foot and Ankle Surgery   40 mg at 10/31/24 0606     polyethylene glycol (MIRALAX) Packet 17 g  17 g Oral Daily Haley Joseph DPM, Podiatry/Foot and Ankle Surgery   17 g at 10/31/24 0843    sacubitril-valsartan (ENTRESTO) 49-51 MG per tablet 1 tablet  1 tablet Oral BID Haley Joseph DPM, Podiatry/Foot and Ankle Surgery   1 tablet at 10/31/24 0843    senna-docusate (SENOKOT-S/PERICOLACE) 8.6-50 MG per tablet 2 tablet  2 tablet Oral BID Sherron Gamble MD   2 tablet at 10/30/24 2131    sevelamer carbonate (RENVELA) tablet 800 mg  800 mg Oral TID w/meals Haley Joseph DPM, Podiatry/Foot and Ankle Surgery   800 mg at 10/31/24 1327    sodium chloride (PF) 0.9% PF flush 3 mL  3 mL Intracatheter Q8H Haley Joseph DPM, Podiatry/Foot and Ankle Surgery   3 mL at 10/31/24 0844    torsemide (DEMADEX) tablet 100 mg  100 mg Oral Daily Vega Guzman MD   100 mg at 10/31/24 0843    warfarin ANTICOAGULANT (COUMADIN) tablet 6 mg  6 mg Oral ONCE at 18:00 Chai España MD        Warfarin Dose Required Daily - Pharmacist Managed  1 each Oral See Admin Instructions Chai España MD            Data   Recent Labs   Lab 10/31/24  1727 10/31/24  1326 10/31/24  0856 10/31/24  0711 10/30/24  0826 10/30/24  0721 10/29/24  1221 10/29/24  0914 10/29/24  0900 10/29/24  0643   WBC  --   --   --  13.1*  --  10.8  --  10.8  --   --    HGB  --   --   --  8.5*  --  7.1*  --  5.9*  --   --    MCV  --   --   --  94  --  98  --  103*  --   --    PLT  --   --   --  317  --  311  --  310  --   --    INR  --   --   --  1.69*  --  1.18*  --  1.13  --  1.17*   NA  --   --   --  132*  --  134*  --   --   --  132*   POTASSIUM  --   --   --  4.0  --  4.3  --   --   --  4.1   CHLORIDE  --   --   --  94*  --  93*  --   --   --  94*   CO2  --   --   --  28  --  28  --   --   --  28   BUN  --   --   --  55.6*  --  57.5*  --   --   --  50.8*   CR  --   --   --  7.56*  --  7.60*  --   --   --  7.85*   ANIONGAP  --   --   --  10  --  13  --   --   --  10   TERENCE  --   --   --  9.3  --  9.2  --   --    --  8.6*   * 162* 174* 215*   < > 301*   < >  --    < > 310*   ALBUMIN  --   --   --  2.2*  --  2.4*  --   --   --  2.3*    < > = values in this interval not displayed.       No results found for this or any previous visit (from the past 24 hours).    Chai España MD  Text Page  (7am to 6pm)

## 2024-10-31 NOTE — PROGRESS NOTES
Cycler set up per protocol and per treatment orders      Results from previous treatment:  Effluent color and clarity: Yellow and clear  Total UF: 351 ml  Initial Drain: 65 ml  Avg Dwell: 1:17  Lost Dwell: 0:25     Cycler Serial Number: [id number]  Casette:  lot# [id number], Exp: 27  Total Treatment Volume: 49539 mL  Total treatment time: 9 hrs  Fill Volume: 2000 ml  Last Fill Volume:0 ml  Heater ba.5% 6000mL;  Lot #: [id number];  Expiration Date: 27  Side Bag #1: 2.5% 6000mL;  Lot #: [id number];  Expiration Date: 27     Number of cycles including final fill: 5  Dwell Time: 1:22 minutes  Last Fill Volume: 0 ml  Initial Drain Alarm: 10 ml  PD orders reviewed with Patient procedure and ESRD teaching done and questions answered.  Cycler ready for hook-up.    Report given to: MICHELLE Barrera RN

## 2024-10-31 NOTE — PROGRESS NOTES
[facility]     Renal Progress Note       SHORTHAND KEY FOR MY NOTES:  c = with, s = without, p = after, a = before, x = except, asx = asymptomatic, tx = transplant or treatment, sx = symptoms or symptomatic, cx = canceled or culture, rxn = reaction, yday = yesterday, nl = normal, abx = antibiotics, fxn = function, dx = diagnosis, dz = disease, m/h = melena/hematochezia, c/d/l/ha = cramping/dizziness/lightheadedness/headache, d/c = discharge or diarrhea/constipation, f/c/n/v = fevers/chills/nausea/vomiting, cp/sob = chest pain/shortness of breath, tbv = total body volume, rxn = reaction, tdc = tunneled dialysis catheter, pta = prior to admission, hd = hemodialysis, pd = peritoneal dialysis, hhd = home hemodialysis, edw = estimated dry wt         Assessment/Plan:     1.  ESKD.  Pt's sugars are still high and he isn't getting good dialysis still.  A.  Increase glargine dose.  D/w Dr. España.  B.  Continue torsemide.  C.  We will continue all 2.5% bags.  D.  If he cramps tonight, then give him some saline.  E.  [facility] is the best TCU for him to go to bc he's on PD.  Others are not generally well-trained for proper PD.    2.  Severe LLE PAD.  Pt is s/p angio.  His pain is decently controlled.  A.  Pain meds prn.  B.  Further plans per Vasc Surg.    3.  Anemia.  Pt's hb is up p the transfusion yday.  A.  Follow hb, clinically.    4.  HTN.  Pt's BP is controlled c current regimen.  A.  Continue same meds/doses for now.    5.  DM2.  Pt's sugars are still suboptimally controlled.  A.  Increase PM glargine to 35 units and continue AM at 10 units.  B.  Continue high ISS.    6.  FEN.  He doesn't like the diabetic diet.  A.  Encouraged pt to make better dietary choices to reduce sugar since he's not on a diabetic diet.    Case d/w Dr. Taco.        Interval History:     Pt is doing fine today.  No cp/sob.  His leg still hurts a bit but not too bad.  His sugars are better but still too  high.          Medications and Allergies:     Current Facility-Administered Medications   Medication Dose Route Frequency Provider Last Rate Last Admin    allopurinol (ZYLOPRIM) tablet 200 mg  200 mg Oral QPM Haley Joseph DPM, Podiatry/Foot and Ankle Surgery   200 mg at 10/30/24 2131    atorvastatin (LIPITOR) tablet 40 mg  40 mg Oral At Bedtime Haley Joseph DPM, Podiatry/Foot and Ankle Surgery   40 mg at 10/30/24 2131    calcitRIOL (ROCALTROL) capsule 0.25 mcg  0.25 mcg Oral At Bedtime Haley Joseph DPM, Podiatry/Foot and Ankle Surgery   0.25 mcg at 10/30/24 2131    carvedilol (COREG) tablet 6.25 mg  6.25 mg Oral At Bedtime Haley Joseph DPM, Podiatry/Foot and Ankle Surgery   6.25 mg at 10/30/24 2131    cinacalcet (SENSIPAR) tablet 60 mg  60 mg Oral Once per day on Monday Wednesday Friday Haley Joseph DPM, Podiatry/Foot and Ankle Surgery   60 mg at 10/30/24 0805    clopidogrel (PLAVIX) tablet 75 mg  75 mg Oral Daily Chai España MD   75 mg at 10/31/24 0843    insulin aspart (NovoLOG) injection (RAPID ACTING)  1-10 Units Subcutaneous TID  hCai España MD   2 Units at 10/31/24 1327    insulin aspart (NovoLOG) injection (RAPID ACTING)  1-7 Units Subcutaneous At Bedtime Chai España MD   2 Units at 10/30/24 2133    insulin glargine (LANTUS PEN) injection 10 Units  10 Units Subcutaneous QAM AC Vega Guzman MD   10 Units at 10/31/24 0844    insulin glargine (LANTUS PEN) injection 30 Units  30 Units Subcutaneous At Bedtime Cahi España MD   30 Units at 10/30/24 2133    pantoprazole (PROTONIX) EC tablet 40 mg  40 mg Oral BID Haley Joseph DPM, Podiatry/Foot and Ankle Surgery   40 mg at 10/31/24 0843    polyethylene glycol (MIRALAX) Packet 17 g  17 g Oral Daily Haley Joseph DPM, Podiatry/Foot and Ankle Surgery   17 g at 10/31/24 0843    sacubitril-valsartan (ENTRESTO) 49-51 MG per tablet 1 tablet  1 tablet Oral BID Haley Joseph DPM, Podiatry/Foot and Ankle Surgery   1  tablet at 10/31/24 0843    senna-docusate (SENOKOT-S/PERICOLACE) 8.6-50 MG per tablet 2 tablet  2 tablet Oral BID Sherron Gamble MD   2 tablet at 10/30/24 2131    sevelamer carbonate (RENVELA) tablet 800 mg  800 mg Oral TID w/meals Haley Joseph DPM, Podiatry/Foot and Ankle Surgery   800 mg at 10/31/24 1327    sodium chloride (PF) 0.9% PF flush 3 mL  3 mL Intracatheter Q8H Haley Joseph DPM, Podiatry/Foot and Ankle Surgery   3 mL at 10/31/24 0844    torsemide (DEMADEX) tablet 100 mg  100 mg Oral Daily Vega Guzman MD   100 mg at 10/31/24 0843    warfarin ANTICOAGULANT (COUMADIN) tablet 6 mg  6 mg Oral ONCE at 18:00 Chai España MD        Warfarin Dose Required Daily - Pharmacist Managed  1 each Oral See Admin Instructions Chai España MD         No Known Allergies       Physical Exam:     Vitals were reviewed     , Blood pressure 117/84, pulse 101, temperature 98.2  F (36.8  C), temperature source Oral, resp. rate 16, weight 79.8 kg (175 lb 14.8 oz), SpO2 100%.  Wt Readings from Last 3 Encounters:   10/30/24 79.8 kg (175 lb 14.8 oz)   10/15/24 78.6 kg (173 lb 4.5 oz)   10/08/24 78.7 kg (173 lb 8 oz)     Intake/Output Summary (Last 24 hours) at 10/31/2024 1544  Last data filed at 10/31/2024 1000  Gross per 24 hour   Intake 600 ml   Output 300 ml   Net 300 ml     GENERAL APPEARANCE: pleasant, NAD, interactive, facial edema  RESP: CTA B c good efforts  CV: RRR c 2/6 m, nl S1/S2   ABDOMEN: s/nt/nd, + PD catheter  EXTREMITIES/SKIN: + edema; LLE dressed         Data:     CBC RESULTS:     Recent Labs   Lab 10/31/24  0711 10/30/24  0721 10/29/24  0914 10/28/24  0827 10/26/24  0645 10/25/24  2225 10/25/24  1741   WBC 13.1* 10.8 10.8  --  10.4  --   --    RBC 2.57* 2.11* 1.67*  --  2.17*  --   --    HGB 8.5* 7.1* 5.9*  --  7.4*  --  8.9*   HCT 24.2* 20.6* 17.2*  --  21.9*  --   --     311 [phone]  --      Basic Metabolic Panel:  Recent Labs   Lab 10/31/24  1326 10/31/24  0850  10/31/24  0711 10/30/24  2107 10/30/24  1706 10/30/24  1310 10/30/24  0826 10/30/24  0721 10/29/24  0900 10/29/24  0643 10/28/24  0835 10/28/24  0827 10/27/24  0615 10/27/24  0231 10/26/24  1431 10/26/24  0645   NA  --   --  132*  --   --   --   --  134*  --  132*  --  136  --  133*  --  135   POTASSIUM  --   --  4.0  --   --   --   --  4.3  --  4.1  --  4.5  --  4.6  --  4.4   CHLORIDE  --   --  94*  --   --   --   --  93*  --  94*  --  96*  --  94*  --  97*   CO2  --   --  28  --   --   --   --  28  --  28  --  28  --  28  --  24   BUN  --   --  55.6*  --   --   --   --  57.5*  --  50.8*  --  49.7*  --  43.1*  --  41.7*   CR  --   --  7.56*  --   --   --   --  7.60*  --  7.85*  --  7.97*  --  8.17*  --  7.86*   * 174* 215* 232* 106* 177*   < > 301*   < > 310*   < > 255*   < > 361*   < > 340*   TERENCE  --   --  9.3  --   --   --   --  9.2  --  8.6*  --  9.0  --  9.2  --  9.1    < > = values in this interval not displayed.     INR  Recent Labs   Lab 10/31/24  0711 10/30/24  0721 10/29/24  0914 10/29/24  0643   INR 1.69* 1.18* 1.13 1.17*      Attestation:   I have reviewed today's relevant vital signs, notes, medications, labs and imaging.    Vega Guzman MD  [facility] - Nephrology  [phone]

## 2024-10-31 NOTE — PROGRESS NOTES
[facility]  Hospitalist Progress Note  Chai España MD  10/30/2024    Assessment & Plan   Mr. Arnulfo Pritchett is a 64 yo male with Afib on warfarin, GERD, gout, insomnia, ESRD on PD, HFrEF, HTN, IDDM, diabetic peripheral neuropathy, CAD s/p stent placement x 3 (last 2021), TALI, HLD, and RLE PAD s/p balloon angio and stenting of SFA/popliteal arteries presented to [facility] ED with worsening pain and redness in left toe with streaking up the leg  for the last 2 days with throbbing pain.       Worsening L great toe wound    Ischemic left toe   10/27 s/p amputation of left great toe   Presented to OSH with 2 day history of erythema and streaking redness going up his leg with associated increase pain and throbbing noted. No fever but both CRP 55 and ESR 86 are elevated.   - XR of left foot show:Soft tissue swelling great toe. There is subtle cortical  irregularity and probable erosive change along the distal phalanx/tuft  of the great toe which does raise concern for osteomyelitis.     Obtained MRI foot ->  Distal phalanx of the first digit appears pointed distally with replacement of normal fatty marrow and a soft tissue defect at the distal tuft. About 14 mm of the distal phalanx appear normal with normal fat signal and without significant edema. Constellation of findings favors gangrene.  Podiatry consultation -> will need at least a partial left hallux amputation, once/if vascular status is optimized.   - 10/22 ID consult: Discontinue Zosyn. (Suspected discoloration from ischemia and not cellulitis) changed to oral Augmentin until bypass surgery.  - recommendations from  ID as well: On Augmentin 1 tablet daily every 24 completed 7 days.  - 10/27 s/p amputation of left great toe, podiatry has signed off with wound dressing orderes and follow up after discharge with Dr Joseph.        ## Hx of acute RLE arterial occlusion s/p SFA popliteal balloon angioplasty and stenting, 5/6/2024 (on  warfarin and plavix)   10/27 taken back to the OR for critical limb ischemia of the left lower extremity.  Angiogram showing A prominent branch from GSV going to the deep system causing AVF. After ligation, good antegrade flow to the foot with AT dominant runoff which collateralizes around the ankle to go to PT   Vascular surgery consultation  IR consulted -> 10/17 CTA abdomen/pelvis runoff for further evaluation of disease and to guide procedure planning.    - Right leg with no inflow or femoral-popliteal segment obstructions.  Occluded anterior tibial artery origin with proximal reconstruction without distal stenosis.Normal caliber peroneal artery. Tandem severe stenosis of the distal posterior tibial artery.   - Left leg: No inflow obstruction. Focal calcified severe stenosis of the proximal superficial femoral artery. Long segment occlusion of the mid to distal popliteal artery with low attenuation changes which may represent thrombus or soft  atherosclerotic plaque. Reconstitution of the origins of the posterior tibial arteries. Severe tandem stenoses of the distal posterior tibial artery  10/20 INR to 1.7 (started on heparin drip)   10/20 as per vascular note planning for left femoral to below the knee popliteal in situ bypass graft with embolectomy.   Patient is not a candidate for attempted endovascular thrombectomy since high likelihood of embolization per prior discussions   10/25 left femoral endarterectomy, left femoral to tibioperoneal bypass with left great saphenous vein with wound VAC placement.  10/26 follow-up with vascular surgery starting of heparin  10/27 taken back to the OR more urgently:  A prominent branch from GSV going to the deep system causing AVF. After ligation, good antegrade flow to the foot with AT dominant runoff which collateralizes around the ankle to go to PT .   10/27 patient  seen post operative. Comfortable. Starting back on heparin per vascular surgery   10/28 on heparin  drip with vascular ok to resume anticoagulation with previous anticoagulation.   He is on ASA in hospital but on plavix PTA. On warfarin prior to admit.   Confirm with vascular surgery what can cross over anticoagulation.  Restart plavix 10/29 and coumadin. Will defer to vascular surgery about need for bridging or for 2nd antiplatelet   Patient will need to be bridged with warfarin while on heparin     Pain control  -Patient wanted low-dose pain meds for pain control  -Initially 10/22 discussion about oxycodone or Dilaudid.  He received oxy but wants to do Dilaudid  -Changed to oral solution of Dilaudid as cannot cut the pill in for starting at 0.5 every 6 as needed  -Increase bowel regimen as he feels he might get constipated  -back to oxycodone now PRN     ## Chronic paroxysmal afib s/p atrial ablation, 6/2023  ## HFrEF 2/2 ICM  ## HLD  ## HTN  ## Hx of CAD s/p ALESIA placement (last 2021)  PTA now warfarin and Plavix instead of apixaban as above, as well as Entresto, Coreg, torsemide and statin.  Most recent EF last month on 6/4 with severely decrease LV function with est EF 20-25%.    Noted blood pressure soft 90/70s -> BPs now normalized  Decrease Coreg from 12.5 mg BID to 6.25 mg BID  Holding Torsemide  continue Entresto  continue statin  10/22 Isabell scan abnormal.  No evidence of ischemia.  Medium sized area of infarction in the entire inferolateral left ventricle extending into the inferolateral segment of left ventricle and basal segment.  Small area of nontransmural infarction apex.  EF 26  10/23 cardiology follow-up no ischemia.  Old infarcts.  No high risk findings.  High risk for volume overload but okay to proceed    ## Anemia  ## Acute on chronic   Patient at admission 10.9 and with gradual drop in Hgb in setting of bypass surgery.    Hgb 10.3 > 7.4 > 5.9  Transfuse 1 unit of pRBC and recheck  Follow up Hgb is 7.1, in setting of aggressive ongoing anticoagulation with bridging will transfuse for reserve         ## ESRD on PD 2/2 DM nephropathy  ## Secondary hyperparathyroidism  ## Hyperkalemia  PTA on nightly PD of which he has been tolerating. Access RLQ double cuffed coiled PD catheter placed 4/16/2021.   - Nephrology consulted for PD planning  - Continue RRT medications below  - Continue calcitriol, cinacalcet and sevelamer carbonate  - Continue every other day gentamicin cream to PD cath side  - Lokelma for hyperkalemia   - management per nephrology   - Torsemide on hold (he only takes this very intermittently)     ## Hx of RCC s/p R nephrectomy, 5/2022  -  CTA with 4 mm hyperenhancing nodule in lower pole of the left kidney   - follow up evaluation   - patient is aware of this finding and will follow-up with his urologist after discharge     ## DM type II  ## Diabetic neuropathy  Mod cho diet  high intensity sliding scale insulin  Last A1c 5.9 7/9/24  -10/24 blood sugars reviewed 169 - 147  -10/26 initially on low sodium diet but needed carb diet   - 10/28 blood sugars higher today but appears he was given 4 mg dexamethasone for surgery.   - 10/28 BG elevated, change to high intensity sliding scale insulin  - 10/29 resume 30 units at bedtime and additional 10 units in AM     Constipation:   -Struggles with constipation on MiraLAX daily  -Added Dulcolax suppository scheduled  -Senna twice daily     # Other chronic, stable medical conditions:  # Hx of gout: Continue PTA allopurinol  # TALI: Intolerant to nasal CPAP   # GERD: Continue daily PPI  # Insomnia: Continue PTA melatonin.         Diet:  2 gm NA Diet    DVT Prophylaxis: heparin drip   Rosario Catheter: Not present  Lines: None     Cardiac Monitoring: None  Code Status: Full Code          Clinically Significant Risk Factors         # Hyponatremia: Lowest Na = 133 mmol/L in last 2 days, will monitor as appropriate  # Hypochloremia: Lowest Cl = 94 mmol/L in last 2 days, will monitor as appropriate      # Hypoalbuminemia: Lowest albumin = 2 g/dL at 10/25/2024  6:08  AM, will monitor as appropriate     # Coagulation Defect: INR = 1.28 (Ref range: 0.85 - 1.15) and/or PTT = N/A, will monitor for bleeding    # Hypertension: Noted on problem list                # Financial/Environmental Concerns: none         Disposition Plan     Medically Ready for Discharge:   -- therapy recommending TCU    Disposition:     Interval History   -- Hgb improved with transfusion  -- noted elevated BG    -Data reviewed today: I reviewed all new labs and imaging over the last 24 hours. I personally reviewed no images or EKG's today.    Physical Exam    , Blood pressure (!) 143/91, pulse 97, temperature 97.9  F (36.6  C), temperature source Oral, resp. rate 18, weight 79.8 kg (175 lb 14.8 oz), SpO2 98%.  Vitals:    10/28/24 0900 10/29/24 0633 10/30/24 0448   Weight: 73.1 kg (161 lb 2.5 oz) 76.7 kg (169 lb 1.5 oz) 79.8 kg (175 lb 14.8 oz)     Vital Signs with Ranges  Temp:  [97.9  F (36.6  C)-98.7  F (37.1  C)] 97.9  F (36.6  C)  Pulse:  [97-99] 97  Resp:  [18] 18  BP: (133-143)/(90-94) 143/91  MAP:  [98 mmHg] 98 mmHg  SpO2:  [97 %-99 %] 98 %  I/O's Last 24 hours  I/O last 3 completed shifts:  In: 600 [P.O.:600]  Out: 500 [Urine:500]    Constitutional: Awake, alert, cooperative, no apparent distress  Respiratory: Clear to auscultation bilaterally, no crackles or wheezing  Cardiovascular: Regular rate and rhythm, normal S1 and S2, and no murmur noted  GI: Normal bowel sounds, soft, non-distended, non-tender, PD catheter  Skin/Integumen: No rashes, no cyanosis, ++ LLE edema, foot dressing  Other:      Medications   All medications were reviewed.  Current Facility-Administered Medications   Medication Dose Route Frequency Provider Last Rate Last Admin    dianeal PD LOW calcium-2.5% dex (calcium 2.5 mEq/L) 6,000 mL PERITONEAL DIALYSATE   Dialysis [facility] Supplied PD Vega Guzman MD        heparin 25,000 units in 0.45% NaCl 250 mL ANTICOAGULANT infusion  0-5,000 Units/hr Intravenous Continuous Haley Joseph,  ANGELITA, Podiatry/Foot and Ankle Surgery 13 mL/hr at 10/30/24 0812 1,300 Units/hr at 10/30/24 0812    Patient is already receiving anticoagulation with heparin, enoxaparin (LOVENOX), warfarin (COUMADIN)  or other anticoagulant medication   Does not apply Continuous PRN Haley Joseph DPM, Podiatry/Foot and Ankle Surgery         Current Facility-Administered Medications   Medication Dose Route Frequency Provider Last Rate Last Admin    allopurinol (ZYLOPRIM) tablet 200 mg  200 mg Oral QPM Haley Joseph DPM, Podiatry/Foot and Ankle Surgery   200 mg at 10/30/24 2131    atorvastatin (LIPITOR) tablet 40 mg  40 mg Oral At Bedtime Haley Joseph DPM, Podiatry/Foot and Ankle Surgery   40 mg at 10/30/24 2131    calcitRIOL (ROCALTROL) capsule 0.25 mcg  0.25 mcg Oral At Bedtime Haley Joseph DPM, Podiatry/Foot and Ankle Surgery   0.25 mcg at 10/30/24 2131    carvedilol (COREG) tablet 6.25 mg  6.25 mg Oral At Bedtime Haley Joseph DPM, Podiatry/Foot and Ankle Surgery   6.25 mg at 10/30/24 2131    cinacalcet (SENSIPAR) tablet 60 mg  60 mg Oral Once per day on Monday Wednesday Friday Haley Joseph DPM, Podiatry/Foot and Ankle Surgery   60 mg at 10/30/24 0805    clopidogrel (PLAVIX) tablet 75 mg  75 mg Oral Daily Chai España MD   75 mg at 10/30/24 0802    insulin aspart (NovoLOG) injection (RAPID ACTING)  1-10 Units Subcutaneous TID  Chai España MD   2 Units at 10/30/24 1313    insulin aspart (NovoLOG) injection (RAPID ACTING)  1-7 Units Subcutaneous At Bedtime Chai España MD   2 Units at 10/30/24 2133    insulin glargine (LANTUS PEN) injection 10 Units  10 Units Subcutaneous QAM AC Vega Guzman MD   10 Units at 10/30/24 1139    insulin glargine (LANTUS PEN) injection 30 Units  30 Units Subcutaneous At Bedtime Chai España MD   30 Units at 10/30/24 2133    pantoprazole (PROTONIX) EC tablet 40 mg  40 mg Oral BID Haley Joseph DPM, Podiatry/Foot and Ankle Surgery   40 mg at 10/30/24 2131     polyethylene glycol (MIRALAX) Packet 17 g  17 g Oral Daily Haley Joseph DPM, Podiatry/Foot and Ankle Surgery   17 g at 10/30/24 0802    sacubitril-valsartan (ENTRESTO) 49-51 MG per tablet 1 tablet  1 tablet Oral BID Haley Joseph DPM, Podiatry/Foot and Ankle Surgery   1 tablet at 10/30/24 2131    senna-docusate (SENOKOT-S/PERICOLACE) 8.6-50 MG per tablet 2 tablet  2 tablet Oral BID Sherron Gamble MD   2 tablet at 10/30/24 2131    sevelamer carbonate (RENVELA) tablet 800 mg  800 mg Oral TID w/meals Haley Joseph DPM, Podiatry/Foot and Ankle Surgery   800 mg at 10/30/24 1720    sodium chloride (PF) 0.9% PF flush 3 mL  3 mL Intracatheter Q8H Haley Joseph DPM, Podiatry/Foot and Ankle Surgery   3 mL at 10/30/24 0802    torsemide (DEMADEX) tablet 100 mg  100 mg Oral Daily Vega Guzman MD   100 mg at 10/30/24 1312    Warfarin Dose Required Daily - Pharmacist Managed  1 each Oral See Admin Instructions Chai España MD            Data   Recent Labs   Lab 10/30/24  2107 10/30/24  1706 10/30/24  1310 10/30/24  0826 10/30/24  0721 10/29/24  1221 10/29/24  0914 10/29/24  0900 10/29/24  0643 10/28/24  0835 10/28/24  0827 10/26/24  1431 10/26/24  0645   WBC  --   --   --   --  10.8  --  10.8  --   --   --   --   --  10.4   HGB  --   --   --   --  7.1*  --  5.9*  --   --   --   --   --  7.4*   MCV  --   --   --   --  98  --  103*  --   --   --   --   --  101*   PLT  --   --   --   --  311  --  310  --   --   --  262  --  245   INR  --   --   --   --  1.18*  --  1.13  --  1.17*   < >  --   --  1.28*   NA  --   --   --   --  134*  --   --   --  132*  --  136   < > 135   POTASSIUM  --   --   --   --  4.3  --   --   --  4.1  --  4.5   < > 4.4   CHLORIDE  --   --   --   --  93*  --   --   --  94*  --  96*   < > 97*   CO2  --   --   --   --  28  --   --   --  28  --  28   < > 24   BUN  --   --   --   --  57.5*  --   --   --  50.8*  --  49.7*   < > 41.7*   CR  --   --   --   --  7.60*  --   --   --  7.85*  --   7.97*   < > 7.86*   ANIONGAP  --   --   --   --  13  --   --   --  10  --  12   < > 14   TERENCE  --   --   --   --  9.2  --   --   --  8.6*  --  9.0   < > 9.1   * 106* 177*   < > 301*   < >  --    < > 310*   < > 255*   < > 340*   ALBUMIN  --   --   --   --  2.4*  --   --   --  2.3*  --  2.4*   < > 2.6*    < > = values in this interval not displayed.       No results found for this or any previous visit (from the past 24 hours).    Chai España MD  Text Page  (7am to 6pm)

## 2024-11-01 ENCOUNTER — APPOINTMENT (OUTPATIENT)
Dept: PHYSICAL THERAPY | Facility: CLINIC | Age: 64
DRG: 252 | End: 2024-11-01
Attending: HOSPITALIST
Payer: MEDICARE

## 2024-11-01 LAB
ALBUMIN SERPL BCG-MCNC: 2.3 G/DL (ref 3.5–5.2)
ANION GAP SERPL CALCULATED.3IONS-SCNC: 13 MMOL/L (ref 7–15)
BUN SERPL-MCNC: 55.7 MG/DL (ref 8–23)
CALCIUM SERPL-MCNC: 9.8 MG/DL (ref 8.8–10.4)
CHLORIDE SERPL-SCNC: 94 MMOL/L (ref 98–107)
CREAT SERPL-MCNC: 7.64 MG/DL (ref 0.67–1.17)
EGFRCR SERPLBLD CKD-EPI 2021: 7 ML/MIN/1.73M2
ERYTHROCYTE [DISTWIDTH] IN BLOOD BY AUTOMATED COUNT: 17.2 % (ref 10–15)
GLUCOSE BLDC GLUCOMTR-MCNC: 116 MG/DL (ref 70–99)
GLUCOSE BLDC GLUCOMTR-MCNC: 121 MG/DL (ref 70–99)
GLUCOSE BLDC GLUCOMTR-MCNC: 133 MG/DL (ref 70–99)
GLUCOSE BLDC GLUCOMTR-MCNC: 156 MG/DL (ref 70–99)
GLUCOSE BLDC GLUCOMTR-MCNC: 183 MG/DL (ref 70–99)
GLUCOSE SERPL-MCNC: 135 MG/DL (ref 70–99)
HCO3 SERPL-SCNC: 26 MMOL/L (ref 22–29)
HCT VFR BLD AUTO: 21.1 % (ref 40–53)
HGB BLD-MCNC: 7.3 G/DL (ref 13.3–17.7)
INR PPP: 2.18 (ref 0.85–1.15)
MCH RBC QN AUTO: 32.9 PG (ref 26.5–33)
MCHC RBC AUTO-ENTMCNC: 34.6 G/DL (ref 31.5–36.5)
MCV RBC AUTO: 95 FL (ref 78–100)
PHOSPHATE SERPL-MCNC: 5.7 MG/DL (ref 2.5–4.5)
PLATELET # BLD AUTO: 362 10E3/UL (ref 150–450)
POTASSIUM SERPL-SCNC: 4.2 MMOL/L (ref 3.4–5.3)
RBC # BLD AUTO: 2.22 10E6/UL (ref 4.4–5.9)
SODIUM SERPL-SCNC: 133 MMOL/L (ref 135–145)
UFH PPP CHRO-ACNC: 0.24 IU/ML
WBC # BLD AUTO: 14.1 10E3/UL (ref 4–11)

## 2024-11-01 PROCEDURE — 99232 SBSQ HOSP IP/OBS MODERATE 35: CPT | Performed by: INTERNAL MEDICINE

## 2024-11-01 PROCEDURE — 85610 PROTHROMBIN TIME: CPT | Performed by: INTERNAL MEDICINE

## 2024-11-01 PROCEDURE — 90945 DIALYSIS ONE EVALUATION: CPT

## 2024-11-01 PROCEDURE — 82565 ASSAY OF CREATININE: CPT | Performed by: PODIATRIST

## 2024-11-01 PROCEDURE — 82310 ASSAY OF CALCIUM: CPT | Performed by: PODIATRIST

## 2024-11-01 PROCEDURE — 250N000013 HC RX MED GY IP 250 OP 250 PS 637: Performed by: PODIATRIST

## 2024-11-01 PROCEDURE — 250N000013 HC RX MED GY IP 250 OP 250 PS 637: Performed by: INTERNAL MEDICINE

## 2024-11-01 PROCEDURE — 85018 HEMOGLOBIN: CPT | Performed by: INTERNAL MEDICINE

## 2024-11-01 PROCEDURE — 97530 THERAPEUTIC ACTIVITIES: CPT | Mod: GP

## 2024-11-01 PROCEDURE — 85520 HEPARIN ASSAY: CPT | Performed by: INTERNAL MEDICINE

## 2024-11-01 PROCEDURE — 90999 UNLISTED DIALYSIS PROCEDURE: CPT

## 2024-11-01 PROCEDURE — 250N000011 HC RX IP 250 OP 636: Performed by: PODIATRIST

## 2024-11-01 PROCEDURE — 82040 ASSAY OF SERUM ALBUMIN: CPT | Performed by: PODIATRIST

## 2024-11-01 PROCEDURE — 250N000011 HC RX IP 250 OP 636

## 2024-11-01 PROCEDURE — 120N000001 HC R&B MED SURG/OB

## 2024-11-01 PROCEDURE — 36415 COLL VENOUS BLD VENIPUNCTURE: CPT | Performed by: INTERNAL MEDICINE

## 2024-11-01 RX ORDER — WARFARIN SODIUM 5 MG/1
5 TABLET ORAL
Status: COMPLETED | OUTPATIENT
Start: 2024-11-01 | End: 2024-11-01

## 2024-11-01 RX ORDER — GENTAMICIN SULFATE 1 MG/G
CREAM TOPICAL DAILY PRN
Status: DISCONTINUED | OUTPATIENT
Start: 2024-11-01 | End: 2024-11-03

## 2024-11-01 RX ORDER — FERROUS SULFATE 325(65) MG
325 TABLET ORAL DAILY
Status: DISCONTINUED | OUTPATIENT
Start: 2024-11-02 | End: 2024-11-02

## 2024-11-01 RX ORDER — PIPERACILLIN SODIUM, TAZOBACTAM SODIUM 2; .25 G/10ML; G/10ML
2.25 INJECTION, POWDER, LYOPHILIZED, FOR SOLUTION INTRAVENOUS EVERY 8 HOURS
Status: DISCONTINUED | OUTPATIENT
Start: 2024-11-01 | End: 2024-11-05

## 2024-11-01 RX ADMIN — CALCITRIOL CAPSULES 0.25 MCG 0.25 MCG: 0.25 CAPSULE ORAL at 21:31

## 2024-11-01 RX ADMIN — PANTOPRAZOLE SODIUM 40 MG: 40 TABLET, DELAYED RELEASE ORAL at 21:30

## 2024-11-01 RX ADMIN — SACUBITRIL AND VALSARTAN 1 TABLET: 49; 51 TABLET, FILM COATED ORAL at 21:30

## 2024-11-01 RX ADMIN — WARFARIN SODIUM 5 MG: 5 TABLET ORAL at 18:28

## 2024-11-01 RX ADMIN — SEVELAMER CARBONATE 800 MG: 800 TABLET, FILM COATED ORAL at 13:29

## 2024-11-01 RX ADMIN — SEVELAMER CARBONATE 800 MG: 800 TABLET, FILM COATED ORAL at 18:28

## 2024-11-01 RX ADMIN — ALLOPURINOL 200 MG: 100 TABLET ORAL at 21:29

## 2024-11-01 RX ADMIN — SACUBITRIL AND VALSARTAN 1 TABLET: 49; 51 TABLET, FILM COATED ORAL at 08:22

## 2024-11-01 RX ADMIN — TORSEMIDE 100 MG: 100 TABLET ORAL at 08:22

## 2024-11-01 RX ADMIN — CINACALCET 60 MG: 30 TABLET ORAL at 08:22

## 2024-11-01 RX ADMIN — ONDANSETRON 4 MG: 2 INJECTION INTRAMUSCULAR; INTRAVENOUS at 09:19

## 2024-11-01 RX ADMIN — ATORVASTATIN CALCIUM 40 MG: 40 TABLET, FILM COATED ORAL at 21:30

## 2024-11-01 RX ADMIN — CARVEDILOL 6.25 MG: 6.25 TABLET, FILM COATED ORAL at 21:29

## 2024-11-01 RX ADMIN — PIPERACILLIN AND TAZOBACTAM 2.25 G: 2; .25 INJECTION, POWDER, FOR SOLUTION INTRAVENOUS at 16:32

## 2024-11-01 RX ADMIN — SEVELAMER CARBONATE 800 MG: 800 TABLET, FILM COATED ORAL at 08:22

## 2024-11-01 RX ADMIN — CLOPIDOGREL BISULFATE 75 MG: 75 TABLET ORAL at 08:23

## 2024-11-01 RX ADMIN — PANTOPRAZOLE SODIUM 40 MG: 40 TABLET, DELAYED RELEASE ORAL at 08:23

## 2024-11-01 NOTE — PROGRESS NOTES
PD machine alarming with system error 2197 during dwell 3 of 5. Followed instructions per [facility] hotline and system continues to alarm. Per hotline, pt needs [facility] machine switched out, [facility] to [facility]. Was instructed to disconnect patient from PD machine at this time, pt disconnected from machine and tubing clamped.

## 2024-11-01 NOTE — PROGRESS NOTES
"Vascular Surgery Progress Note    Patient seen this morning. Note increasing WBC. He feels okay, reports issues with oozing from left groin.     Exam:   Afebrile, HR 100s, BP 140s/90s  Awake, alert, nad  Nlb on RA  incisions on lower leg clean, dry, in tact. No erythema, drainage.   Left groin incision with areas of exposed fat which appear devitalized, no purulence, no erythema. However with significant bruising. Picture as below. Noted oozing, appear to be from skin bleeders.   Biphasic doppler signals LLE DP and PT as well as in graft.         Labs: hgb 7.3 from 8.4          Wbc up again at  14.1 from 13    A/P \"  64yom s/p L femoral to TP trunk bypass on 10/25 w/ partially translocated in situ GSV, ligation of side branches on 10/27 who is doing well postoperatively. Retention sutures tied 10/30, with removal of wound vac. WBC increased again,groin wound with devitalized tissue at base. Will continue to monitor closely, no plans for OR at this time for groin however will watch closely.     - OK to continue regular diet  - Continue cms/doppler checks  - Will monitor groin at this time, oozing from skin bleeder, but will ctm given hgb drop.   - Continue bridging warfarin w/ heparin  - continue plavix.   - continue to encourage IS, up JOSH Gamble MD  Vascular Surgery PGY4  To reach vascular surgery [place] team on call, please go to [facility] and from the drop down find the following:   VASCULAR SURGERY/[facility]  Then page the person listed to the right of day/night call. Can click the pager to page.         "

## 2024-11-01 NOTE — PLAN OF CARE
Goal Outcome Evaluation:      Plan of Care Reviewed With: patient    Overall Patient Progress: no changeOverall Patient Progress: no change       Shift: 11/1/24, [id number]  POD# 7 L femoral endartectomy, L femoral to tibial peroneal trunk bypass w/ L great saphenous vein POD# 4 L great toe amputation.    Orientation: A&Ox4  Vital Signs: VSS on RA  Labs: See orders  Pain Management: Denied pain meds this shift, 2/10 pain  Bowel: No BM, passing gas  Bladder: Patient did not have much output, is on peritoneal dialysis  IV: PIV - SL  Wounds/Incisions: LLE incision, changed x1. Left toe dressing. Left groin incision dressing. Peritoneal dialysis clamped.   Diet: 2 gram Na  Activity Level: Ax1; not oob this shift  Anticipated Discharge Date/Plan: Pending  Significant Information: Dialysis machine was switched out and is ready to use this evening

## 2024-11-01 NOTE — PROGRESS NOTES
[facility]     Renal Progress Note       SHORTHAND KEY FOR MY NOTES:  c = with, s = without, p = after, a = before, x = except, asx = asymptomatic, tx = transplant or treatment, sx = symptoms or symptomatic, cx = canceled or culture, rxn = reaction, yday = yesterday, nl = normal, abx = antibiotics, fxn = function, dx = diagnosis, dz = disease, m/h = melena/hematochezia, c/d/l/ha = cramping/dizziness/lightheadedness/headache, d/c = discharge or diarrhea/constipation, f/c/n/v = fevers/chills/nausea/vomiting, cp/sob = chest pain/shortness of breath, tbv = total body volume, rxn = reaction, tdc = tunneled dialysis catheter, pta = prior to admission, hd = hemodialysis, pd = peritoneal dialysis, hhd = home hemodialysis, edw = estimated dry wt         Assessment/Plan:     1.  ESKD.  Pt's sugars are finally better controlled and his UF was better, but the machine didn't work properly and he only had 3/5 cycles completed last night.  A.  Same PD schedule today.  Hopefully, the machine will work properly.  B.  Continue torsemide at same dose.  C.  If he cramps tonight, then give him some saline.  D.  The [facility] is the best TCU for him to go to bc he's on PD.  [facility] does not have staff properly trained in PD so would not recommend going there.    2.  Severe LLE PAD.  Pt is s/p angio.  His pain is decently controlled.  A.  Pain meds prn.  B.  Further plans per Vasc Surg.    3.  Anemia.  Pt's hb is down today p oozing noted from the L groin.    A.  Follow hb, clinically.  B.  Transfuse prn.    4.  HTN.  Pt's BP is controlled c current regimen.  A.  Continue same meds/doses for now.  B.  If the BP drops now that he will UF better, decrease carvedilol first.    5.  DM2.  Pt's sugars are finally better controlled c the current regimen.  A.  Continue glargine 10a, 35p.  B.  Continue high ISS.    6.  FEN.  He doesn't like the diabetic diet.  A.  Encouraged pt to make better dietary choices to  reduce sugar since he's not on a diabetic diet.    Case d/w Dr. España.        Interval History:     Pt does not have any major complaints. Oozing from the L groin was noted in the chart.          Medications and Allergies:     Current Facility-Administered Medications   Medication Dose Route Frequency Provider Last Rate Last Admin    allopurinol (ZYLOPRIM) tablet 200 mg  200 mg Oral QPM Haley Joseph DPM, Podiatry/Foot and Ankle Surgery   200 mg at 10/31/24 2112    atorvastatin (LIPITOR) tablet 40 mg  40 mg Oral At Bedtime Haley Joseph DPM, Podiatry/Foot and Ankle Surgery   40 mg at 10/31/24 2112    calcitRIOL (ROCALTROL) capsule 0.25 mcg  0.25 mcg Oral At Bedtime Haley Joseph DPM, Podiatry/Foot and Ankle Surgery   0.25 mcg at 10/31/24 2113    carvedilol (COREG) tablet 6.25 mg  6.25 mg Oral At Bedtime Haley Joseph DPM, Podiatry/Foot and Ankle Surgery   6.25 mg at 10/31/24 2112    cinacalcet (SENSIPAR) tablet 60 mg  60 mg Oral Once per day on Monday Wednesday Friday Haley Joseph DPM, Podiatry/Foot and Ankle Surgery   60 mg at 11/01/24 0822    clopidogrel (PLAVIX) tablet 75 mg  75 mg Oral Daily Chai España MD   75 mg at 11/01/24 0823    insulin aspart (NovoLOG) injection (RAPID ACTING)  1-10 Units Subcutaneous TID  Chai España MD   1 Units at 10/31/24 1755    insulin aspart (NovoLOG) injection (RAPID ACTING)  1-7 Units Subcutaneous At Bedtime Chai España MD   2 Units at 10/30/24 2133    insulin glargine (LANTUS PEN) injection 10 Units  10 Units Subcutaneous QAM AC Vega Guzman MD   10 Units at 11/01/24 0915    insulin glargine (LANTUS PEN) injection 30 Units  30 Units Subcutaneous At Bedtime Chai España MD   30 Units at 10/31/24 2129    pantoprazole (PROTONIX) EC tablet 40 mg  40 mg Oral BID Haley Joseph DPM, Podiatry/Foot and Ankle Surgery   40 mg at 11/01/24 0823    piperacillin-tazobactam (ZOSYN) 3.375 g vial to attach to  mL bag  3.375 g Intravenous Q8H  Sherron Gamble MD        polyethylene glycol (MIRALAX) Packet 17 g  17 g Oral Daily Haley Joseph DPM, Podiatry/Foot and Ankle Surgery   17 g at 10/31/24 0843    sacubitril-valsartan (ENTRESTO) 49-51 MG per tablet 1 tablet  1 tablet Oral BID Haley Joseph DPM, Podiatry/Foot and Ankle Surgery   1 tablet at 11/01/24 0822    senna-docusate (SENOKOT-S/PERICOLACE) 8.6-50 MG per tablet 2 tablet  2 tablet Oral BID Sherron Gamble MD   2 tablet at 10/30/24 2131    sevelamer carbonate (RENVELA) tablet 800 mg  800 mg Oral TID w/meals Haley Joseph DPM, Podiatry/Foot and Ankle Surgery   800 mg at 11/01/24 1329    sodium chloride (PF) 0.9% PF flush 3 mL  3 mL Intracatheter Q8H Haley Joseph DPM, Podiatry/Foot and Ankle Surgery   3 mL at 11/01/24 0824    torsemide (DEMADEX) tablet 100 mg  100 mg Oral Daily Vega Guzman MD   100 mg at 11/01/24 0822    warfarin ANTICOAGULANT (COUMADIN) tablet 5 mg  5 mg Oral ONCE at 18:00 Chai España MD        Warfarin Dose Required Daily - Pharmacist Managed  1 each Oral See Admin Instructions Chai España MD         No Known Allergies       Physical Exam:     Vitals were reviewed     , Blood pressure 127/85, pulse 101, temperature 97.5  F (36.4  C), temperature source Axillary, resp. rate 18, weight 83 kg (182 lb 15.7 oz), SpO2 100%.  Wt Readings from Last 3 Encounters:   11/01/24 83 kg (182 lb 15.7 oz)   10/15/24 78.6 kg (173 lb 4.5 oz)   10/08/24 78.7 kg (173 lb 8 oz)     Intake/Output Summary (Last 24 hours) at 11/1/2024 1554  Last data filed at 11/1/2024 1331  Gross per 24 hour   Intake 2339 ml   Output 650 ml   Net 1689 ml     GENERAL APPEARANCE: pleasant, NAD, interactive, facial edema  RESP: CTA B c good efforts  CV: RRR c 2/6 m, nl S1/S2   ABDOMEN: s/nt/nd, + PD catheter  EXTREMITIES/SKIN: + edema; LLE dressed         Data:     CBC RESULTS:     Recent Labs   Lab 11/01/24  0713 10/31/24  0711 10/30/24  0721 10/29/24  0914 10/28/24  0827 10/26/24  0645  10/25/24  2225 10/25/24  1741   WBC 14.1* 13.1* 10.8 10.8  --  10.4  --   --    RBC 2.22* 2.57* 2.11* 1.67*  --  2.17*  --   --    HGB 7.3* 8.5* 7.1* 5.9*  --  7.4*  --  8.9*   HCT 21.1* 24.2* 20.6* 17.2*  --  21.9*  --   --     317 311 310 262 245   < >  --     < > = values in this interval not displayed.     Basic Metabolic Panel:  Recent Labs   Lab 11/01/24  1232 11/01/24  0913 11/01/24  0713 11/01/24  0655 10/31/24  2126 10/31/24  1727 10/31/24  0856 10/31/24  0711 10/30/24  0826 10/30/24  0721 10/29/24  0900 10/29/24  0643 10/28/24  0835 10/28/24  0827 10/27/24  0615 10/27/24  0231   NA  --   --  133*  --   --   --   --  132*  --  134*  --  132*  --  136  --  133*   POTASSIUM  --   --  4.2  --   --   --   --  4.0  --  4.3  --  4.1  --  4.5  --  4.6   CHLORIDE  --   --  94*  --   --   --   --  94*  --  93*  --  94*  --  96*  --  94*   CO2  --   --  26  --   --   --   --  28  --  28  --  28  --  28  --  28   BUN  --   --  55.7*  --   --   --   --  55.6*  --  57.5*  --  50.8*  --  49.7*  --  43.1*   CR  --   --  7.64*  --   --   --   --  7.56*  --  7.60*  --  7.85*  --  7.97*  --  8.17*   * 116* 135* 156* 151* 161*   < > 215*   < > 301*   < > 310*   < > 255*   < > 361*   TERENCE  --   --  9.8  --   --   --   --  9.3  --  9.2  --  8.6*  --  9.0  --  9.2    < > = values in this interval not displayed.     INR  Recent Labs   Lab 11/01/24  0713 10/31/24  0711 10/30/24  0721 10/29/24  0914   INR 2.18* 1.69* 1.18* 1.13      Attestation:   I have reviewed today's relevant vital signs, notes, medications, labs and imaging.    Vega Guzman MD  [facility] - Nephrology  [phone]

## 2024-11-01 NOTE — PROGRESS NOTES
"Vascular Surgery Progress Note    Patient seen this afternoon, no acute issues overnight     Exam:   Afebrile, HR 100s, BP 140s/90s  Awake, alert, nad  Nlb on RA  incisions on lower leg clean, dry, in tact. No erythema,  Biphasic doppler signals LLE DP and PT as well as in graft.   Left groin incision with mod amount serosang drainage on kerlix fluff under ABD, not soaking through    Labs: hgb 8.5           Wbc bump to 13.1    A/P \"  64yom s/p L femoral to TP trunk bypass on 10/25 w/ partially translocated in situ GSV, ligation of side branches on 10/27 who is doing well postoperatively. Retention sutures tied 10/30, with removal of wound vac. Bump in WBC today, more tachycardic this afternoon. Will ctm for now, no obvious signs infection at wound sites.      - OK to continue regular diet  - Continue cms/doppler checks  - Will trend wbc tomorrow, continue to eval incision sites for poss source of infection  - Plan for pt to discharge on plavix/wafarin   - Continue bridging warfarin w/ heparin  - continue to encourage IS, up OSURJIT Gamble MD  Vascular Surgery PGY4  To reach vascular surgery [place] team on call, please go to [facility] and from the drop down find the following:   VASCULAR SURGERY/[facility]  Then page the person listed to the right of day/night call. Can click the pager to page.         "

## 2024-11-01 NOTE — PROGRESS NOTES
[facility]  Hospitalist Progress Note  Chai España MD  11/01/2024    Assessment & Plan   Mr. Arnulfo Pritchett is a 62 yo male with Afib on warfarin, GERD, gout, insomnia, ESRD on PD, HFrEF, HTN, IDDM, diabetic peripheral neuropathy, CAD s/p stent placement x 3 (last 2021), ATLI, HLD, and RLE PAD s/p balloon angio and stenting of SFA/popliteal arteries presented to [facility] ED with worsening pain and redness in left toe with streaking up the leg  for the last 2 days with throbbing pain.       ## Ischemic l great toe wound    ## S/p amputation of left great toe 10/27  Presented to OSH with 2 day history of erythema and streaking redness going up his leg with associated increase pain and throbbing noted. No fever but both CRP 55 and ESR 86 are elevated.   - XR of left foot show:Soft tissue swelling great toe. There is subtle cortical  irregularity and probable erosive change along the distal phalanx/tuft  of the great toe which does raise concern for osteomyelitis.     Obtained MRI foot ->  Distal phalanx of the first digit appears pointed distally with replacement of normal fatty marrow and a soft tissue defect at the distal tuft. About 14 mm of the distal phalanx appear normal with normal fat signal and without significant edema. Constellation of findings favors gangrene.  Podiatry consultation -> noted, needed at least a partial left hallux amputation, once/if vascular status is optimized.   - 10/22 ID consult: Discontinue Zosyn. (Suspected discoloration from ischemia and not cellulitis) changed to oral Augmentin until bypass surgery.  - recommendations from  ID as well: On Augmentin 1 tablet daily every 24 completed 7 days.  - 10/27 s/p amputation of left great toe, podiatry has signed off with wound dressing orderes and follow up after discharge with Dr Joseph.        ## Hx of acute RLE arterial occlusion s/p SFA popliteal balloon angioplasty and stenting, 5/6/2024 (on warfarin and  plavix)   ## Critical limb ischemia of the left lower extremity (OR 10/27)  Angiogram showing   ## Left groin wound  A prominent branch from GSV going to the deep system causing AVF. After ligation, good antegrade flow to the foot with AT dominant runoff which collateralizes around the ankle to go to PT   Vascular surgery consultation  IR consulted -> 10/17 CTA abdomen/pelvis runoff for further evaluation of disease and to guide procedure planning.    - Right leg with no inflow or femoral-popliteal segment obstructions.  Occluded anterior tibial artery origin with proximal reconstruction without distal stenosis.Normal caliber peroneal artery. Tandem severe stenosis of the distal posterior tibial artery.   - Left leg: No inflow obstruction. Focal calcified severe stenosis of the proximal superficial femoral artery. Long segment occlusion of the mid to distal popliteal artery with low attenuation changes which may represent thrombus or soft  atherosclerotic plaque. Reconstitution of the origins of the posterior tibial arteries. Severe tandem stenoses of the distal posterior tibial artery  10/20 INR to 1.7 (started on heparin drip)   10/20 as per vascular note planning for left femoral to below the knee popliteal in situ bypass graft with embolectomy.   Patient is not a candidate for attempted endovascular thrombectomy since high likelihood of embolization per prior discussions   10/25 left femoral endarterectomy, left femoral to tibioperoneal bypass with left great saphenous vein with wound VAC placement.  10/26 follow-up with vascular surgery starting of heparin  10/27 taken back to the OR more urgently:  A prominent branch from GSV going to the deep system causing AVF. After ligation, good antegrade flow to the foot with AT dominant runoff which collateralizes around the ankle to go to PT .   10/27 patient  seen post operative. Comfortable. Starting back on heparin per vascular surgery   10/28 on heparin drip with  vascular ok to resume anticoagulation with previous anticoagulation.   He is on ASA in hospital but on plavix PTA. On warfarin prior to admit.   Confirm with vascular surgery what can cross over anticoagulation.  Restart plavix 10/29 and coumadin. Will defer to vascular surgery about need for bridging or for 2nd antiplatelet   10/31 Patient will need to be bridged with warfarin while on heparin, INR 1.6  11/1 heparin discontinued, INR is therapeutic, noted some groind wound oozing and areas of devitalized tissue at base, biphasic dopplers signals LLE DP and PT per vascular surgery     Pain control  -Patient wanted low-dose pain meds for pain control  -Initially 10/22 discussion about oxycodone or Dilaudid.  He received oxy but wants to do Dilaudid  -Changed to oral solution of Dilaudid as cannot cut the pill in for starting at 0.5 every 6 as needed  -Increase bowel regimen as he feels he might get constipated  -continue oxycodone now PRN     ## Chronic paroxysmal afib s/p atrial ablation, 6/2023  ## HFrEF 2/2 ICM  ## HLD  ## HTN  ## Hx of CAD s/p ALESIA placement (last 2021)  PTA now warfarin and Plavix instead of apixaban as above, as well as Entresto, Coreg, torsemide and statin.  Most recent EF last month on 6/4 with severely decrease LV function with est EF 20-25%.    Noted blood pressure soft 90/70s -> BPs now normalized  Decrease Coreg from 12.5 mg BID to 6.25 mg BID  Holding Torsemide  continue Entresto  continue statin  10/22 Isabell scan abnormal.  No evidence of ischemia.  Medium sized area of infarction in the entire inferolateral left ventricle extending into the inferolateral segment of left ventricle and basal segment.  Small area of nontransmural infarction apex.  EF 26  10/23 cardiology follow-up no ischemia.  Old infarcts.  No high risk findings.  High risk for volume overload but okay to proceed    ## Anemia  ## Acute blood loss anemia on chronic   Patient at admission 10.9 and with gradual drop in Hgb  in setting of bypass surgery.    Hgb 10.3 > 7.4 > 5.9 > 1 unit >  7.1 > 1 units > 8.5 > 7.3  Should expect some blood drop in setting of groin wound oozing, transfuse for Hgb < or equal to 7  EPO as needed       ## ESRD on PD 2/2 DM nephropathy  ## Secondary hyperparathyroidism  ## Hyperkalemia  PTA on nightly PD of which he has been tolerating. Access RLQ double cuffed coiled PD catheter placed 4/16/2021.   - Nephrology consulted for PD planning  - Continue RRT medications below  - Continue calcitriol, cinacalcet and sevelamer carbonate  - Continue every other day gentamicin cream to PD cath side  - Lokelma for hyperkalemia   - management per nephrology   - Torsemide resumed     ## Hx of RCC s/p R nephrectomy, 5/2022  -  CTA with 4 mm hyperenhancing nodule in lower pole of the left kidney   - follow up evaluation   - patient is aware of this finding and will follow-up with his urologist after discharge     ## DM type II  ## Diabetic neuropathy  Regular diet - refused diabetic diet  high intensity sliding scale insulin  Last A1c 5.9 7/9/24  -10/24 blood sugars reviewed 169 - 147  -10/26 initially on low sodium diet but needed carb diet   - 10/28 blood sugars higher today but appears he was given 4 mg dexamethasone for surgery.   - 10/28 BG elevated, change to high intensity sliding scale insulin  - 10/31 increase to 35 units at bedtime and additional 10 units in AM  - 11/1 BG all < 170 and improved     Constipation:   -Struggles with constipation on MiraLAX daily  -Added Dulcolax suppository scheduled  -Senna twice daily     # Other chronic, stable medical conditions:  # Hx of gout: Continue PTA allopurinol  # TALI: Intolerant to nasal CPAP   # GERD: Continue daily PPI  # Insomnia: Continue PTA melatonin.         Diet:  Renal diet   DVT Prophylaxis: warfarin  Rosario Catheter: Not present  Lines: None     Cardiac Monitoring: None  Code Status: Full Code          Clinically Significant Risk Factors         #  Hyponatremia: Lowest Na = 133 mmol/L in last 2 days, will monitor as appropriate  # Hypochloremia: Lowest Cl = 94 mmol/L in last 2 days, will monitor as appropriate      # Hypoalbuminemia: Lowest albumin = 2 g/dL at 10/25/2024  6:08 AM, will monitor as appropriate     # Coagulation Defect: INR = 1.28 (Ref range: 0.85 - 1.15) and/or PTT = N/A, will monitor for bleeding    # Hypertension: Noted on problem list                # Financial/Environmental Concerns: none         Disposition Plan     Medically Ready for Discharge:   -- therapy recommending TCU    Disposition:   -- not ready yet, monitoring left groin wound and finding appropriate TCU that can accommodate peritoneal dialysis.  Anticipate next week.    Interval History   -- BG improve control  -- INR now therapeutic  -- noted oozing from left groin site with some areas of devitalized tissue  -- had more pain overnight of left leg but took tylenol, has oxy available.    -Data reviewed today: I reviewed all new labs and imaging over the last 24 hours. I personally reviewed no images or EKG's today.    Physical Exam    , Blood pressure 127/85, pulse 101, temperature 97.5  F (36.4  C), temperature source Axillary, resp. rate 18, weight 83 kg (182 lb 15.7 oz), SpO2 100%.  Vitals:    10/30/24 0448 10/31/24 1900 11/01/24 0200   Weight: 79.8 kg (175 lb 14.8 oz) 79.9 kg (176 lb 2.4 oz) 83 kg (182 lb 15.7 oz)     Vital Signs with Ranges  Temp:  [97.5  F (36.4  C)-98.2  F (36.8  C)] 97.5  F (36.4  C)  Pulse:  [101-113] 101  Resp:  [16-18] 18  BP: ()/(69-91) 127/85  SpO2:  [96 %-100 %] 100 %  I/O's Last 24 hours  I/O last 3 completed shifts:  In: 840 [P.O.:840]  Out: 750 [Urine:750]    Constitutional: Awake, alert, cooperative, no apparent distress  Respiratory: Clear to auscultation bilaterally, no crackles or wheezing  Cardiovascular: Regular rate and rhythm, normal S1 and S2 , +M  GI: Normal bowel sounds, soft, non-distended, non-tender, PD  catheter  Skin/Integumen: No rashes, no cyanosis, ++ LLE edema, foot dressing  Other:  Left groin dressing    Medications   All medications were reviewed.  Current Facility-Administered Medications   Medication Dose Route Frequency Provider Last Rate Last Admin    dianeal PD LOW calcium-2.5% dex (calcium 2.5 mEq/L) 6,000 mL PERITONEAL DIALYSATE   Dialysis [facility] Supplied PD Vega Guzman MD        Patient is already receiving anticoagulation with heparin, enoxaparin (LOVENOX), warfarin (COUMADIN)  or other anticoagulant medication   Does not apply Continuous PRN Haley Joseph DPM, Podiatry/Foot and Ankle Surgery         Current Facility-Administered Medications   Medication Dose Route Frequency Provider Last Rate Last Admin    allopurinol (ZYLOPRIM) tablet 200 mg  200 mg Oral QPM Haley Joseph DPM, Podiatry/Foot and Ankle Surgery   200 mg at 10/31/24 2112    atorvastatin (LIPITOR) tablet 40 mg  40 mg Oral At Bedtime Haley Joseph DPM, Podiatry/Foot and Ankle Surgery   40 mg at 10/31/24 2112    calcitRIOL (ROCALTROL) capsule 0.25 mcg  0.25 mcg Oral At Bedtime Haley Joseph DPM, Podiatry/Foot and Ankle Surgery   0.25 mcg at 10/31/24 2113    carvedilol (COREG) tablet 6.25 mg  6.25 mg Oral At Bedtime Haley Joseph DPM, Podiatry/Foot and Ankle Surgery   6.25 mg at 10/31/24 2112    cinacalcet (SENSIPAR) tablet 60 mg  60 mg Oral Once per day on Monday Wednesday Friday Haley Joseph DPM, Podiatry/Foot and Ankle Surgery   60 mg at 11/01/24 0822    clopidogrel (PLAVIX) tablet 75 mg  75 mg Oral Daily Chai España MD   75 mg at 11/01/24 0823    insulin aspart (NovoLOG) injection (RAPID ACTING)  1-10 Units Subcutaneous TID AC Chai España MD   1 Units at 10/31/24 1755    insulin aspart (NovoLOG) injection (RAPID ACTING)  1-7 Units Subcutaneous At Bedtime Chai España MD   2 Units at 10/30/24 2133    insulin glargine (LANTUS PEN) injection 10 Units  10 Units Subcutaneous QAM Vega Samano MD    10 Units at 11/01/24 0915    insulin glargine (LANTUS PEN) injection 30 Units  30 Units Subcutaneous At Bedtime Chai España MD   30 Units at 10/31/24 2129    pantoprazole (PROTONIX) EC tablet 40 mg  40 mg Oral BID Haley Joseph DPM, Podiatry/Foot and Ankle Surgery   40 mg at 11/01/24 0823    polyethylene glycol (MIRALAX) Packet 17 g  17 g Oral Daily Haley Joseph DPM, Podiatry/Foot and Ankle Surgery   17 g at 10/31/24 0843    sacubitril-valsartan (ENTRESTO) 49-51 MG per tablet 1 tablet  1 tablet Oral BID Haley Joseph DPM, Podiatry/Foot and Ankle Surgery   1 tablet at 11/01/24 0822    senna-docusate (SENOKOT-S/PERICOLACE) 8.6-50 MG per tablet 2 tablet  2 tablet Oral BID Sherron Gamble MD   2 tablet at 10/30/24 2131    sevelamer carbonate (RENVELA) tablet 800 mg  800 mg Oral TID w/meals Haley Joseph DPM, Podiatry/Foot and Ankle Surgery   800 mg at 11/01/24 0822    sodium chloride (PF) 0.9% PF flush 3 mL  3 mL Intracatheter Q8H Haley Joseph DPM, Podiatry/Foot and Ankle Surgery   3 mL at 11/01/24 0824    torsemide (DEMADEX) tablet 100 mg  100 mg Oral Daily Vega Guzman MD   100 mg at 11/01/24 0822    warfarin ANTICOAGULANT (COUMADIN) tablet 5 mg  5 mg Oral ONCE at 18:00 Chai España MD        Warfarin Dose Required Daily - Pharmacist Managed  1 each Oral See Admin Instructions Chai España MD            Data   Recent Labs   Lab 11/01/24  0913 11/01/24  0713 11/01/24  0655 10/31/24  0856 10/31/24  0711 10/30/24  0826 10/30/24  0721   WBC  --  14.1*  --   --  13.1*  --  10.8   HGB  --  7.3*  --   --  8.5*  --  7.1*   MCV  --  95  --   --  94  --  98   PLT  --  362  --   --  317  --  311   INR  --  2.18*  --   --  1.69*  --  1.18*   NA  --  133*  --   --  132*  --  134*   POTASSIUM  --  4.2  --   --  4.0  --  4.3   CHLORIDE  --  94*  --   --  94*  --  93*   CO2  --  26  --   --  28  --  28   BUN  --  55.7*  --   --  55.6*  --  57.5*   CR  --  7.64*  --   --  7.56*  --  7.60*    ANIONGAP  --  13  --   --  10  --  13   TERENCE  --  9.8  --   --  9.3  --  9.2   * 135* 156*   < > 215*   < > 301*   ALBUMIN  --  2.3*  --   --  2.2*  --  2.4*    < > = values in this interval not displayed.       No results found for this or any previous visit (from the past 24 hours).    Chai España MD  Text Page  (7am to 6pm)

## 2024-11-01 NOTE — PLAN OF CARE
Goal Outcome Evaluation:      Plan of Care Reviewed With: patient    Overall Patient Progress: no changeOverall Patient Progress: no change     Date & Time:10/31/24 3101-2070  Surgery/POD#: 6 Left Femoral Endartectomy, Left Femoral to Tibial peroneal Trunk Bypass w/ Left great sephaneous vein. Pod 3 Left great toe amputation    Behavior & Aggression: Green  Fall Risk: Yes   Orientation:A&Ox4   ABNL VS/O2: VSS on RA exc tachy at times  ABNL Labs:    Pain Management: Pain managed with PRN tylenol. Has oxy available.   Bowel/Bladder: Continent. Peritoneal dialysis patient.   IV/Drains: PIV SL. Assisted pt with connecting self to PD machine.    Wounds/incisions: LLE incision with dressing small drainage. Left toe incision, Left groin incision small drainage noted. Peritoneal dialysis cath infusing overnight.  Diet: 2G sodium diet.   Activity Level: Ax1, not oob this shift.    Tests/Procedures: N/A  Anticipated  DC Date: Awaiting placement at TCU that assists with PD.  Significant Information: Pulses + w/ doppler. Senna held d/t loose stool during day shift.

## 2024-11-01 NOTE — PROGRESS NOTES
Spoke with Dialysis to let them know about patients PD machine not working. They let me know they will bring a new machine when they set up for him this evening.

## 2024-11-01 NOTE — PLAN OF CARE
Goal Outcome Evaluation:      Plan of Care Reviewed With: patient    Overall Patient Progress: no changeOverall Patient Progress: no change     Date & Time:10/31/24 6433-0824  Surgery/POD#: 7 Left Femoral Endartectomy, Left Femoral to Tibial peroneal Trunk Bypass w/ Left great sephaneous vein. Pod 4 Left great toe amputation    Behavior & Aggression: Green  Fall Risk: Yes   Orientation:A&Ox4   ABNL VS/O2: VSS on RA exc tachy at times  ABNL Labs:  Awaiting AM labs   Pain Management: Pain managed with PRN tylenol, PRN oxy x1.   Bowel/Bladder: Continent. Peritoneal dialysis patient.   IV/Drains: PIV SL. Assisted pt with connecting self to PD machine.    Wounds/incisions: LLE incision with dressing mod drainage, changed x1. Left toe dressing CDI, Left groin incision dressing saturated, dressing changed x1. Peritoneal dialysis clamped.  Diet: 2G sodium diet.   Activity Level: Ax1, not oob this shift.    Tests/Procedures: Pending   Anticipated  DC Date: Pending   Significant Information: Pulses + w/ doppler. Able to shift weight, encourage frequently. Peritoneal dialysis initiated at 9pm, began alarming for system error 2197 during dwell 3 of 5 around 130am, attempted to troubleshoot with [employer] hotline but error not resolved, per clarke, pt needs new PD machine, dialysis nurse not available at night to switch out machines so pt was disconnected from machine at 2am.

## 2024-11-01 NOTE — PROGRESS NOTES
Cycler set up per protocol and per treatment orders      Results from previous treatment: Patient PD machine unable to reset after error alarm at 1:28am tonight. Arana contacted and advised machine be turned in for service. New machine setup today. Nephrologist aware.    Effluent color and clarity: Clear yellow effluent produced with no notable fibrin tissue  Total UF: -2221 (Machine broke down on cycle 3/5 during first 18minutes of drain process. TOTAL drain 3785ml - TOTAL fill 6000 = -2221ml)  Initial Drain: 215  Avg Dwell: 1:20    Patient felt full and required manual drain from PD RN. Total output= 2000ml clear yellow effluent without fibrin     Cycler Serial Number: Switched out from #[id number] to new cycler #[id number]  Total Treatment Volume: 10,000mL  Total treatment time: 9 hrs  Fill Volume: 2000ml  Last Fill Volume:0ml  Heater ba.5% 6000mL;  Lot #: [id number];  Expiration Date: 2026  Side Bag #1: 2.5% 6000mL;  Lot #: [id number];  Expiration Date: 2026  Cassette # [id number], exp.         Number of cycles including final fill:   Dwell Time: 1:22 minutes  Last Fill Volume: 0ml  Initial Drain Alarm: 10ml  PD orders reviewed with Patient procedure and ESRD teaching done and questions answered.  Cycler ready for hook-up.    Report given to: MARYSOL Cervantes consent form signed: yes

## 2024-11-01 NOTE — PROGRESS NOTES
Care Management Follow Up    Length of Stay (days): 17    Expected Discharge Date: 11/04/2024     Concerns to be Addressed: discharge planning  wants PCP f/up scheduled and need to check if [facility] can change to smaller PD bags  Patient plan of care discussed at interdisciplinary rounds: Yes    Anticipated Discharge Disposition: Skilled Nursing Facility              Anticipated Discharge Services: Other (see comment) (Patient is planning to hire a private duty PCA)  Anticipated Discharge DME: None    Patient/family educated on Medicare website which has current facility and service quality ratings:    Education Provided on the Discharge Plan: Other (see comments) (ongoing)  Patient/Family in Agreement with the Plan: yes    Referrals Placed by CM/SW: Internal Clinic Care Coordination, Specialty Providers, Scheduled Follow-up appointments  Private pay costs discussed: Not applicable    Discussed  Partnership in Safe Discharge Planning  document with patient/family: No     Handoff Completed: No, handoff not indicated or clinically appropriate    Additional Information:  Patient clinically accepted by [facility]. Admissions states they would be able to accommodate Peritoneal Dialysis but would need to train their nursing staff prior to patient admitting there. They would also like a confirmation that patient has accepted the bed at [facility] so they know whether or not to proceed with training their nurses. SW let admissions know that patient likely will not be medically ready until Monday and that a SW will be in touch then to confirm plans.       Next Steps: wait until patient is medically ready and then confirm bed    TERRI Olivas

## 2024-11-02 LAB
ALBUMIN SERPL BCG-MCNC: 2.2 G/DL (ref 3.5–5.2)
ANION GAP SERPL CALCULATED.3IONS-SCNC: 16 MMOL/L (ref 7–15)
BUN SERPL-MCNC: 54.9 MG/DL (ref 8–23)
CALCIUM SERPL-MCNC: 9.4 MG/DL (ref 8.8–10.4)
CHLORIDE SERPL-SCNC: 92 MMOL/L (ref 98–107)
CREAT SERPL-MCNC: 7.58 MG/DL (ref 0.67–1.17)
EGFRCR SERPLBLD CKD-EPI 2021: 7 ML/MIN/1.73M2
ERYTHROCYTE [DISTWIDTH] IN BLOOD BY AUTOMATED COUNT: 17.4 % (ref 10–15)
GLUCOSE BLDC GLUCOMTR-MCNC: 136 MG/DL (ref 70–99)
GLUCOSE BLDC GLUCOMTR-MCNC: 156 MG/DL (ref 70–99)
GLUCOSE BLDC GLUCOMTR-MCNC: 159 MG/DL (ref 70–99)
GLUCOSE BLDC GLUCOMTR-MCNC: 160 MG/DL (ref 70–99)
GLUCOSE BLDC GLUCOMTR-MCNC: 240 MG/DL (ref 70–99)
GLUCOSE SERPL-MCNC: 207 MG/DL (ref 70–99)
HCO3 SERPL-SCNC: 24 MMOL/L (ref 22–29)
HCT VFR BLD AUTO: 22.5 % (ref 40–53)
HGB BLD-MCNC: 7.4 G/DL (ref 13.3–17.7)
INR PPP: 2.48 (ref 0.85–1.15)
MCH RBC QN AUTO: 32.7 PG (ref 26.5–33)
MCHC RBC AUTO-ENTMCNC: 32.9 G/DL (ref 31.5–36.5)
MCV RBC AUTO: 100 FL (ref 78–100)
PHOSPHATE SERPL-MCNC: 5.5 MG/DL (ref 2.5–4.5)
PLATELET # BLD AUTO: 340 10E3/UL (ref 150–450)
POTASSIUM SERPL-SCNC: 4 MMOL/L (ref 3.4–5.3)
RBC # BLD AUTO: 2.26 10E6/UL (ref 4.4–5.9)
SODIUM SERPL-SCNC: 132 MMOL/L (ref 135–145)
WBC # BLD AUTO: 11 10E3/UL (ref 4–11)

## 2024-11-02 PROCEDURE — 82565 ASSAY OF CREATININE: CPT | Performed by: PODIATRIST

## 2024-11-02 PROCEDURE — 85018 HEMOGLOBIN: CPT | Performed by: INTERNAL MEDICINE

## 2024-11-02 PROCEDURE — 85610 PROTHROMBIN TIME: CPT | Performed by: INTERNAL MEDICINE

## 2024-11-02 PROCEDURE — 82435 ASSAY OF BLOOD CHLORIDE: CPT | Performed by: PODIATRIST

## 2024-11-02 PROCEDURE — 250N000013 HC RX MED GY IP 250 OP 250 PS 637: Performed by: INTERNAL MEDICINE

## 2024-11-02 PROCEDURE — 99233 SBSQ HOSP IP/OBS HIGH 50: CPT | Performed by: INTERNAL MEDICINE

## 2024-11-02 PROCEDURE — 250N000013 HC RX MED GY IP 250 OP 250 PS 637

## 2024-11-02 PROCEDURE — 250N000013 HC RX MED GY IP 250 OP 250 PS 637: Performed by: PODIATRIST

## 2024-11-02 PROCEDURE — 120N000001 HC R&B MED SURG/OB

## 2024-11-02 PROCEDURE — 99232 SBSQ HOSP IP/OBS MODERATE 35: CPT | Performed by: INTERNAL MEDICINE

## 2024-11-02 PROCEDURE — 85041 AUTOMATED RBC COUNT: CPT | Performed by: INTERNAL MEDICINE

## 2024-11-02 PROCEDURE — 99418 PROLNG IP/OBS E/M EA 15 MIN: CPT | Performed by: INTERNAL MEDICINE

## 2024-11-02 PROCEDURE — 250N000011 HC RX IP 250 OP 636

## 2024-11-02 PROCEDURE — 36415 COLL VENOUS BLD VENIPUNCTURE: CPT | Performed by: INTERNAL MEDICINE

## 2024-11-02 PROCEDURE — 82947 ASSAY GLUCOSE BLOOD QUANT: CPT | Performed by: PODIATRIST

## 2024-11-02 RX ORDER — WARFARIN SODIUM 5 MG/1
5 TABLET ORAL
Status: COMPLETED | OUTPATIENT
Start: 2024-11-02 | End: 2024-11-02

## 2024-11-02 RX ADMIN — CLOPIDOGREL BISULFATE 75 MG: 75 TABLET ORAL at 10:04

## 2024-11-02 RX ADMIN — SEVELAMER CARBONATE 800 MG: 800 TABLET, FILM COATED ORAL at 18:13

## 2024-11-02 RX ADMIN — SEVELAMER CARBONATE 800 MG: 800 TABLET, FILM COATED ORAL at 13:27

## 2024-11-02 RX ADMIN — PIPERACILLIN AND TAZOBACTAM 2.25 G: 2; .25 INJECTION, POWDER, FOR SOLUTION INTRAVENOUS at 00:56

## 2024-11-02 RX ADMIN — PANTOPRAZOLE SODIUM 40 MG: 40 TABLET, DELAYED RELEASE ORAL at 21:06

## 2024-11-02 RX ADMIN — CARVEDILOL 6.25 MG: 6.25 TABLET, FILM COATED ORAL at 21:06

## 2024-11-02 RX ADMIN — WARFARIN SODIUM 5 MG: 5 TABLET ORAL at 18:13

## 2024-11-02 RX ADMIN — SACUBITRIL AND VALSARTAN 1 TABLET: 49; 51 TABLET, FILM COATED ORAL at 21:06

## 2024-11-02 RX ADMIN — FERROUS SULFATE TAB 325 MG (65 MG ELEMENTAL FE) 325 MG: 325 (65 FE) TAB at 10:04

## 2024-11-02 RX ADMIN — PANTOPRAZOLE SODIUM 40 MG: 40 TABLET, DELAYED RELEASE ORAL at 10:04

## 2024-11-02 RX ADMIN — ATORVASTATIN CALCIUM 40 MG: 40 TABLET, FILM COATED ORAL at 21:06

## 2024-11-02 RX ADMIN — PIPERACILLIN AND TAZOBACTAM 2.25 G: 2; .25 INJECTION, POWDER, FOR SOLUTION INTRAVENOUS at 10:05

## 2024-11-02 RX ADMIN — SEVELAMER CARBONATE 800 MG: 800 TABLET, FILM COATED ORAL at 10:04

## 2024-11-02 RX ADMIN — TORSEMIDE 100 MG: 100 TABLET ORAL at 10:05

## 2024-11-02 RX ADMIN — SACUBITRIL AND VALSARTAN 1 TABLET: 49; 51 TABLET, FILM COATED ORAL at 10:04

## 2024-11-02 RX ADMIN — PIPERACILLIN AND TAZOBACTAM 2.25 G: 2; .25 INJECTION, POWDER, FOR SOLUTION INTRAVENOUS at 18:13

## 2024-11-02 RX ADMIN — ALLOPURINOL 200 MG: 100 TABLET ORAL at 21:05

## 2024-11-02 RX ADMIN — CALCITRIOL CAPSULES 0.25 MCG 0.25 MCG: 0.25 CAPSULE ORAL at 21:06

## 2024-11-02 NOTE — PROGRESS NOTES
[facility]    Internal Medicine Hospitalist Progress Note  11/02/2024  I evaluated patient on the above date.    Ed Bonilla Jr., MD  [phone] (p)  Text Page  Vocera      [New actions/orders today (11/02/2024) are underlined in the assessment and plan. All lab results in the assessment and plan were reviewed.]  Assessment & Plan   Mr. Arnulfo Pritchett is a 62 yo male with history including DM2; HTN; CHF (EF in the 20's); CAD; PAD with prior revascularization; ESRD on PD; gout; HLD; TALI (intolerant to CPAP); and h/o RCC s/p right nephrectomy (2022). He presented to [facility] 10/15/2024 with worsening pain and redness in the left great toe with streaking up the leg. There was concern for limb ischemia and transferred to [facility] 10/16/2024 for management.      # Critical limb ischemia of the left lower extremity.  # S/p left femoral endarterectomy with bovine pericardial patch angioplasty; left distal common femoral artery/proximal superficial femoral artery to tibioperoneal trunk bypass; and temporary closure of left groin wound with wound VAC 10/25/2024.  # S/p ligation of side branches of the left great saphenous vein and temporary closure of right groin with application of wound VAC 10/27/2024.  # Ischemic left great toe wound - s/p amputation of left great toe 10/27/2024.  # Possible right groin wound infection.  # H/o acute RLE arterial occlusion s/p SFA popliteal balloon angioplasty and stenting (5/2024).  * Initial presentation to [facility] 10/15 as above. On initial evaluation, pt was afebrile, hemodynamically stable. Labs notable for CBC with WBC normal, hgb 12.5; cr 9.84 (chronic PD); CRP 55.81; INR 4.42. Transferred to [facility] 10/16.  * Started on piperacillin-tazobactam on admit. Vascular surgery and podiatry consulted. Warfarin held on admit (in part for supratherapeutic INR).  * 10/16: MRI left foot  showed constellation of findings favoring gangrene; no other osseous abnormalities to  suggest osteomyelitis; degenerative changes.  * 10/17: IR left lower extremity angiogram 10/17 abandoned due to limited arterial access options.  CTA 10/17 showed significant bilateral PAD including left lower extremity focal calcified severe stenosis of the proximal superficial femoral artery, long segment occlusion of the mid to distal popliteal artery with low attenuation changes which may represent thrombus or soft atherosclerotic plaque, reconstitution of the origins of the posterior tibial arteries and severe tandem stenoses of the distal posterior tibial artery.  * 10/20: Started heparin gtt.  * 10/22: ID consulted. Piperacillin-tazobactam discontinued and changed to amoxicillin-clavulanate.  * 10/25: Underwent left lower extremity bypass as above.  * 10/27: Underwent left great toe amputation as above. Later noted a distinct loss of signals on a.m. rounds; duplex sonography showed flow reversal in the mid=graft with paucity of flow going beyond mid graft. Taken urgently for surgery as above.  * 10/29: Warfarin restarted. Clopidogrel restarted (had been on ASA this hospitalization). Stopped amoxicillin-clavulanate.  * 11/1: Heparin gtt stopped. Restarted on piperacillin-tazobactam by vascular for possible left groin wound infection.  Recent Labs   Lab 11/02/24  0648 11/01/24  0713 10/31/24  0711 10/30/24  0721 10/29/24  0914 10/29/24  0643   INR 2.48* 2.18* 1.69* 1.18* 1.13 1.17*   - Post-op management per vascular surgery.  - Continue piperacillin-tazobactam (re-started 11/1) per vascular.  - Continue clopidogrel and atorvastatin.  - Continue warfarin.  - Continue PT and OT - plan TCU.  - Pain management as noted below.  - Appreciate consultant help.      Pain control.  * This hospitalization, noted that patient wanted low-dose pain meds for pain control.  * Ultimately started on low dose PRN opioids (see prior notes).  - Continue PRN oxycodone 2.5-5 mg q4h; PRN IV hydromorphone 0.1-0.2 mg q2h; minimize  opioids as able.  - Continue bowel regimen to reduce risk of constipation as noted.     Ischemic CM (HFrEF).  CAD with h/o stents x3 (last in 2021).   Hypertension (benign essential).  HLD.  [PTA BP meds: carvedilol 12.5 mg at bedtime; sacubitril-valsartan 49-51 mg BID; torsemide 100 mg qam.]  * Echo 6/2024 showed LVEF 20-25%, segmental wall motion globally hypokinetic.   * Seen by cardiology this hospitalization.  * Nuc stress test 10/22 was abnormal but no evidence of ischemia; medium sized area of infarction in the entire inferolateral segment(s) of the left ventricle extending into basal inferior segment; small area of nontransmural infarction in the apical segment(s) of the left ventricle; TID absent; left ventricular ejection fraction at stress 26%.  - Continue atorvastatin and clopidogrel.  - Continue carvedilol, sacubitril-valsartan and torsemide.  - Continue to monitor daily wts.  - Additional volume management via PD as noted.    Chronic paroxysmal afib s/p atrial ablation (6/2023).  - Continue carvedilol.  - Continue anticoagulation as above.    Acute blood loss anemia on chronic anemia.  * Hgb 10.9 on admit and with gradual drop in hgb in setting of above surgeries.  Recent Labs   Lab 11/02/24  0648 11/01/24  0713 10/31/24  0711 10/30/24  0721 10/29/24  0914   HGB 7.4* 7.3* 8.5* 7.1* 5.9*   - Continue to monitor CBC - repeat in am.  - Consider prbc transfusion if hgb </= 7.0 or if significant bleeding with hemodynamic instability or if symptomatic.    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  Secondary hyperparathyroidism  Hyperkalemia, resolved.  * PTA on nightly PD of which he has been tolerating. Access via RLQ double cuffed coiled PD catheter placed 4/16/2021.   * Nephrology consulted for PD.  * Treated with sodium zirconium cyclosilicate for hyperkalemia this hospitalization.  * K normalized 10/17.  - Continue PD per nephrology.  - Continue calcitriol, cinacalcet and sevelamer carbonate.  - Continue every  "other day gentamicin cream to PD cath site.  - Appreciate nephrology help.     Hyperenhancing left lower pole renal nodule on CT.  Hx of RCC s/p R nephrectomy (5/2022).  *  CTA 10/17 also showed a 4 mm hyperenhancing nodule in lower pole of the left kidney, noted  that this may represent a small primary renal cell carcinoma.  * Pt made aware of the above finding.  - Pt is aware of this finding and will follow-up closely with his urologist after discharge.     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  [PTA: glargine 39U at bedtime.]  * Hgba1c 5.7 10/16/2024.  * Pt declined diabetic diet this hospitalization.  Recent Labs   Lab 11/02/24  1327 11/02/24  0939 11/02/24  0648 11/02/24  0208 11/01/24  2159 11/01/24  1710   * 136* 207* 240* 183* 121*     Recent Labs   Lab Test 10/16/24  0642 07/09/24  0914   A1C 5.7* 5.9*   - Continue carb controlled diet (pt refuses diabetic diet).  - Try stopping am glargine.  - Continue glargine 30U at bedtime, plan titrate back to home dosing.  - Continue aspart ISS (high).     Constipation.  * Noted that pt struggles with constipation at times.  - Continue scheduled polyethylene glycol and senna-docusate PRN senna-docusate; PRN bisacodyl suppository.    Gout.  - Continue allopurinol.    GERD.  - Continue PPI.    TALI.  - Noted intolerant to CPAP.  - Continue to monitor clinically.    Clinically Significant Risk Factors         # Hyponatremia: Lowest Na = 132 mmol/L in last 2 days, will monitor as appropriate  # Hypochloremia: Lowest Cl = 92 mmol/L in last 2 days, will monitor as appropriate   # Hypercalcemia: corrected calcium is >10.1, will monitor as appropriate    # Hypoalbuminemia: Lowest albumin = 2 g/dL at 10/25/2024  6:08 AM, will monitor as appropriate     # Hypertension: Noted on problem list           # Overweight: Estimated body mass index is 25.99 kg/m  as calculated from the following:    Height as of 10/8/24: 1.82 m (5' 11.65\").    Weight as of this encounter: " 86.1 kg (189 lb 13.1 oz).      # Financial/Environmental Concerns: none         Diet: Combination Diet Renal Diet (dialysis); 2 gm NA Diet  Snacks/Supplements Adult: Glucerna; Between Meals    Prophylaxis: PCD's, ambulation, and is on warfarin.  Rosario Catheter: Not present  Lines: None     Code Status: Full Code    Disposition Plan   Medically Ready for Discharge: Anticipated in 2-4 Days  Expected discharge to transitional care unit (TCU) pending stability/improvement/resolution of above acute issues and consultant evaluation/recommendations/clearance.    Entered: Ed Bonilla MD 11/02/2024, 12:56 PM         TIME SPENT: I spent approximately 65 minutes bedside and on the inpatient unit today managing the care of patient. Over 50% of my time on the unit was spent in extensive chart review, counseling the patient/family and/or coordinating care regarding services listed in this note.        Interval History   Doing OK presently.  Notes pain at times in groin and lower extremity.  Trying to avoid taking opioids but some times needs to.    * Data reviewed today: I reviewed all new labs and imaging over the last 24 hours. I personally reviewed the ECG tracing showing findings as described above in the A/P.    Physical Exam   Most recent vitals:   , Blood pressure 116/74, pulse 92, temperature 97.7  F (36.5  C), temperature source Oral, resp. rate 18, weight 86.1 kg (189 lb 13.1 oz), SpO2 97%. O2 Device: None (Room air)    Vitals:    11/01/24 0200 11/01/24 2148 11/02/24 0616   Weight: 83 kg (182 lb 15.7 oz) 78.4 kg (172 lb 13.5 oz) 86.1 kg (189 lb 13.1 oz)     Vital signs with ranges:  Temp:  [97.7  F (36.5  C)-97.8  F (36.6  C)] 97.7  F (36.5  C)  Pulse:  [] 92  Resp:  [18] 18  BP: (105-116)/(71-81) 116/74  SpO2:  [97 %-98 %] 97 %  Patient Vitals for the past 24 hrs:   BP Temp Temp src Pulse Resp SpO2 Weight   11/02/24 0728 116/74 97.7  F (36.5  C) Oral 92 18 97 % --   11/02/24 0616 -- -- -- -- -- -- 86.1  kg (189 lb 13.1 oz)   11/01/24 2326 111/75 97.8  F (36.6  C) Oral 99 18 97 % --   11/01/24 2148 111/71 97.7  F (36.5  C) Oral 101 18 98 % 78.4 kg (172 lb 13.5 oz)   11/01/24 2129 111/71 -- -- 101 -- -- --   11/01/24 1617 105/81 97.7  F (36.5  C) Oral 104 18 98 % --     I/O's last 24 hours:  I/O last 3 completed shifts:  In: 1799 [P.O.:360; Other:1439]  Out: 250 [Urine:250]    Constitutional: awake, alert, oriented, pleasant, conversant   Head:   Eyes:   ENT:   Neck:   Cardiovascular: regular rate and rhythm; no murmurs, rubs or gallops  Lungs: diminished in the bases, no crackles or wheezes  Gastrointestinal/Abdomen: soft, non-tender, non-distended, positive bowel sounds  :   Musculoskeletal:   Skin/Extremities:   Neurologic:   Psychiatric:   Hematologic/Lymphatic/Immunologic:        Labs reviewed.  Recent Labs   Lab 11/02/24  0939 11/02/24  0648 11/02/24  0208 11/01/24  0913 11/01/24  0713 10/31/24  0856 10/31/24  0711   WBC  --  11.0  --   --  14.1*  --  13.1*   HGB  --  7.4*  --   --  7.3*  --  8.5*   MCV  --  100  --   --  95  --  94   PLT  --  340  --   --  362  --  317   INR  --  2.48*  --   --  2.18*  --  1.69*   NA  --  132*  --   --  133*  --  132*   POTASSIUM  --  4.0  --   --  4.2  --  4.0   CHLORIDE  --  92*  --   --  94*  --  94*   CO2  --  24  --   --  26  --  28   BUN  --  54.9*  --   --  55.7*  --  55.6*   CR  --  7.58*  --   --  7.64*  --  7.56*   ANIONGAP  --  16*  --   --  13  --  10   TERENCE  --  9.4  --   --  9.8  --  9.3   * 207* 240*   < > 135*   < > 215*   ALBUMIN  --  2.2*  --   --  2.3*  --  2.2*    < > = values in this interval not displayed.     No lab results found.  Recent Labs   Lab 11/02/24  0939 11/02/24  0648 11/02/24  0208 11/01/24  2159 11/01/24  1710 11/01/24  1232   * 207* 240* 183* 121* 133*     Recent Labs   Lab Test 10/16/24  0642 07/09/24  0914   A1C 5.7* 5.9*       Recent Labs   Lab 11/02/24  0648 11/01/24  0713 10/31/24  0711 10/30/24  0721 10/29/24  0914  10/29/24  0643   INR 2.48* 2.18* 1.69* 1.18* 1.13 1.17*     Recent Labs   Lab 11/02/24  0648 11/01/24  0713 10/31/24  0711 10/30/24  0721 10/29/24  0914   WBC 11.0 14.1* 13.1* 10.8 10.8       MICRO:  Cultures (including blood and urine):  No lab results found in last 7 days.    No results found for this or any previous visit (from the past 24 hours).    Medications   All medications were reviewed. MAR.    Infusions:  Current Facility-Administered Medications   Medication Dose Route Frequency Provider Last Rate Last Admin    dianeal PD LOW calcium-2.5% dex (calcium 2.5 mEq/L) 6,000 mL PERITONEAL DIALYSATE   Dialysis [facility] Supplied PD Vega Guzman MD        Patient is already receiving anticoagulation with heparin, enoxaparin (LOVENOX), warfarin (COUMADIN)  or other anticoagulant medication   Does not apply Continuous PRN Haley Joseph DPM, Podiatry/Foot and Ankle Surgery         Scheduled Medications:  Current Facility-Administered Medications   Medication Dose Route Frequency Provider Last Rate Last Admin    allopurinol (ZYLOPRIM) tablet 200 mg  200 mg Oral QPM Haley Joseph DPM, Podiatry/Foot and Ankle Surgery   200 mg at 11/01/24 2129    atorvastatin (LIPITOR) tablet 40 mg  40 mg Oral At Bedtime Haley Joseph DPM, Podiatry/Foot and Ankle Surgery   40 mg at 11/01/24 2130    calcitRIOL (ROCALTROL) capsule 0.25 mcg  0.25 mcg Oral At Bedtime Haley Joseph DPM, Podiatry/Foot and Ankle Surgery   0.25 mcg at 11/01/24 2131    carvedilol (COREG) tablet 6.25 mg  6.25 mg Oral At Bedtime Haley Joseph DPM, Podiatry/Foot and Ankle Surgery   6.25 mg at 11/01/24 2129    cinacalcet (SENSIPAR) tablet 60 mg  60 mg Oral Once per day on Monday Wednesday Friday Haley Joseph DPM, Podiatry/Foot and Ankle Surgery   60 mg at 11/01/24 0822    clopidogrel (PLAVIX) tablet 75 mg  75 mg Oral Daily Chai España MD   75 mg at 11/02/24 1004    ferrous sulfate (FEROSUL) tablet 325 mg  325 mg Oral Daily Sherron Gamble MD    325 mg at 11/02/24 1004    insulin aspart (NovoLOG) injection (RAPID ACTING)  1-10 Units Subcutaneous TID  Chai España MD   1 Units at 10/31/24 1755    insulin aspart (NovoLOG) injection (RAPID ACTING)  1-7 Units Subcutaneous At Bedtime Chai España MD   2 Units at 10/30/24 2133    insulin glargine (LANTUS PEN) injection 10 Units  10 Units Subcutaneous QAM AC Vega Guzman MD   10 Units at 11/02/24 1007    insulin glargine (LANTUS PEN) injection 30 Units  30 Units Subcutaneous At Bedtime Chai España MD   30 Units at 11/01/24 2201    pantoprazole (PROTONIX) EC tablet 40 mg  40 mg Oral BID Haley Joseph DPM, Podiatry/Foot and Ankle Surgery   40 mg at 11/02/24 1004    piperacillin-tazobactam (ZOSYN) 2.25 g vial to attach to  ml bag  2.25 g Intravenous Q8H Sherron Gamble  mL/hr at 11/02/24 0056 2.25 g at 11/02/24 1005    polyethylene glycol (MIRALAX) Packet 17 g  17 g Oral Daily Haley Joseph DPM, Podiatry/Foot and Ankle Surgery   17 g at 10/31/24 0843    sacubitril-valsartan (ENTRESTO) 49-51 MG per tablet 1 tablet  1 tablet Oral BID Haley Joseph DPM, Podiatry/Foot and Ankle Surgery   1 tablet at 11/02/24 1004    senna-docusate (SENOKOT-S/PERICOLACE) 8.6-50 MG per tablet 2 tablet  2 tablet Oral BID Sherron Gamble MD   2 tablet at 10/30/24 2131    sevelamer carbonate (RENVELA) tablet 800 mg  800 mg Oral TID w/meals Haley Joseph DPM, Podiatry/Foot and Ankle Surgery   800 mg at 11/02/24 1004    sodium chloride (PF) 0.9% PF flush 3 mL  3 mL Intracatheter Q8H Haely Joseph DPM, Podiatry/Foot and Ankle Surgery   3 mL at 11/02/24 1007    torsemide (DEMADEX) tablet 100 mg  100 mg Oral Daily Vega Guzman MD   100 mg at 11/02/24 1005    warfarin ANTICOAGULANT (COUMADIN) tablet 5 mg  5 mg Oral ONCE at 18:00 Margaret Kohler [facility]        Warfarin Dose Required Daily - Pharmacist Managed  1 each Oral See Admin Instructions Chai España MD         PRN  Medications:  Current Facility-Administered Medications   Medication Dose Route Frequency Provider Last Rate Last Admin    acetaminophen (TYLENOL) tablet 650 mg  650 mg Oral Q4H PRN Haley Joseph DPM, Podiatry/Foot and Ankle Surgery   650 mg at 10/31/24 2112    bisacodyl (DULCOLAX) suppository 10 mg  10 mg Rectal Daily PRN Haley Joseph DPM, Podiatry/Foot and Ankle Surgery        calcium carbonate (TUMS) chewable tablet 1,000 mg  1,000 mg Oral 4x Daily PRN Haley Joseph DPM, Podiatry/Foot and Ankle Surgery        glucose gel 15-30 g  15-30 g Oral Q15 Min PRN Haley Joseph DPM, Podiatry/Foot and Ankle Surgery        Or    dextrose 50 % injection 25-50 mL  25-50 mL Intravenous Q15 Min PRN Haley Joseph DPM, Podiatry/Foot and Ankle Surgery        Or    glucagon injection 1 mg  1 mg Subcutaneous Q15 Min PRN Haley Joseph DPM, Podiatry/Foot and Ankle Surgery        dianeal PD LOW calcium-2.5% dex (calcium 2.5 mEq/L) 6,000 mL PERITONEAL DIALYSATE   Dialysis [facility] Supplied PD Vega Guzman MD        gentamicin (GARAMYCIN) 0.1 % cream   Topical Daily PRN Vega Guzman MD        HYDROmorphone (DILAUDID) injection 0.1 mg  0.1 mg Intravenous Q2H PRN Haley Joseph DPM, Podiatry/Foot and Ankle Surgery        Or    HYDROmorphone (DILAUDID) injection 0.2 mg  0.2 mg Intravenous Q2H PRN Haley Joseph DPM, Podiatry/Foot and Ankle Surgery        lidocaine (LMX4) cream   Topical Q1H PRN Haley Joseph DPM, Podiatry/Foot and Ankle Surgery        lidocaine 1 % 0.1-1 mL  0.1-1 mL Other Q1H PRN Haley Joseph DPM, Podiatry/Foot and Ankle Surgery        magnesium hydroxide (MILK OF MAGNESIA) suspension 30 mL  30 mL Oral Daily PRN Haley Joseph DPM, Podiatry/Foot and Ankle Surgery        naloxone (NARCAN) injection 0.2 mg  0.2 mg Intravenous Q2 Min PRN Haley Joseph DPM, Podiatry/Foot and Ankle Surgery        Or    naloxone (NARCAN) injection 0.4 mg  0.4 mg Intravenous Q2 Min PRN Haley Joseph DPM, Podiatry/Foot  and Ankle Surgery        Or    naloxone (NARCAN) injection 0.2 mg  0.2 mg Intramuscular Q2 Min PRN Haley Joseph DPM, Podiatry/Foot and Ankle Surgery        Or    naloxone (NARCAN) injection 0.4 mg  0.4 mg Intramuscular Q2 Min PRN aHley Joseph DPM, Podiatry/Foot and Ankle Surgery        ondansetron (ZOFRAN ODT) ODT tab 4 mg  4 mg Oral Q6H PRN Haley Joseph DPM, Podiatry/Foot and Ankle Surgery        Or    ondansetron (ZOFRAN) injection 4 mg  4 mg Intravenous Q6H PRN Haley Joseph DPM, Podiatry/Foot and Ankle Surgery   4 mg at 11/01/24 0919    oxyCODONE IR (ROXICODONE) half-tab 2.5 mg  2.5 mg Oral Q4H PRHaley Mcclure DPM, Podiatry/Foot and Ankle Surgery        Or    oxyCODONE (ROXICODONE) tablet 5 mg  5 mg Oral Q4H PRN Haley Joseph DPM, Podiatry/Foot and Ankle Surgery   5 mg at 10/31/24 2307    Patient is already receiving anticoagulation with heparin, enoxaparin (LOVENOX), warfarin (COUMADIN)  or other anticoagulant medication   Does not apply Continuous PRHaley Mcclure DPM, Podiatry/Foot and Ankle Surgery        prochlorperazine (COMPAZINE) injection 5 mg  5 mg Intravenous Q6H PRHaley Mcclure DPM, Podiatry/Foot and Ankle Surgery        Or    prochlorperazine (COMPAZINE) tablet 5 mg  5 mg Oral Q6H PRN Haley Joseph DPM, Podiatry/Foot and Ankle Surgery        senna-docusate (SENOKOT-S/PERICOLACE) 8.6-50 MG per tablet 1 tablet  1 tablet Oral BID PRHaley Mcclure DPM, Podiatry/Foot and Ankle Surgery   1 tablet at 10/27/24 1116    Or    senna-docusate (SENOKOT-S/PERICOLACE) 8.6-50 MG per tablet 2 tablet  2 tablet Oral BID PRN Haley Joseph DPM, Podiatry/Foot and Ankle Surgery        sodium chloride (PF) 0.9% PF flush 3 mL  3 mL Intracatheter q1 min prHaley Mcclure DPM, Podiatry/Foot and Ankle Surgery   3 mL at 10/27/24 1047

## 2024-11-02 NOTE — PROGRESS NOTES
Vascular Surgery Progress Note    Starting empiric abx with zosyn given rising WBC, tachycardia and concerning appearance of R groin with devitalized tissue.     Will ctm.     Kaiser Gamble MD  Vascular Surgery PGY4  To reach vascular surgery [place] team on call, please go to [facility] and from the drop down find the following:   VASCULAR SURGERY/[facility]  Then page the person listed to the right of day/night call. Can click the pager to page.

## 2024-11-02 NOTE — PROGRESS NOTES
Renal Medicine Progress Note            Assessment/Plan:     # ESRD on PD  # Severe LLE PAD s/p angiogram  # HTN  # Anemia: Hgb is below target  # CKD-MBD:   -calcitriol   -Sensipar   -Renvela    Plan:  # Stop oral Fe, which is not well absorb with ESRD.   # Cont PD with 2.5% dextrose          Interval History:     Afebrile.  VSS.  Pt is laying in bed comfortably.  No questions from him  PD is set up for tonight          Medications and Allergies:     Current Facility-Administered Medications   Medication Dose Route Frequency Provider Last Rate Last Admin    allopurinol (ZYLOPRIM) tablet 200 mg  200 mg Oral QPM Haley Joseph DPM, Podiatry/Foot and Ankle Surgery   200 mg at 11/01/24 2129    atorvastatin (LIPITOR) tablet 40 mg  40 mg Oral At Bedtime Haley Joseph DPM, Podiatry/Foot and Ankle Surgery   40 mg at 11/01/24 2130    calcitRIOL (ROCALTROL) capsule 0.25 mcg  0.25 mcg Oral At Bedtime Haley Joseph DPM, Podiatry/Foot and Ankle Surgery   0.25 mcg at 11/01/24 2131    carvedilol (COREG) tablet 6.25 mg  6.25 mg Oral At Bedtime Haley Joseph DPM, Podiatry/Foot and Ankle Surgery   6.25 mg at 11/01/24 2129    cinacalcet (SENSIPAR) tablet 60 mg  60 mg Oral Once per day on Monday Wednesday Friday Haley Joseph DPM, Podiatry/Foot and Ankle Surgery   60 mg at 11/01/24 0822    clopidogrel (PLAVIX) tablet 75 mg  75 mg Oral Daily Chai España MD   75 mg at 11/02/24 1004    ferrous sulfate (FEROSUL) tablet 325 mg  325 mg Oral Daily Sherron Gamble MD   325 mg at 11/02/24 1004    insulin aspart (NovoLOG) injection (RAPID ACTING)  1-10 Units Subcutaneous TID AC Chai España MD   1 Units at 11/02/24 1328    insulin aspart (NovoLOG) injection (RAPID ACTING)  1-7 Units Subcutaneous At Bedtime Chai España MD   2 Units at 10/30/24 2133    insulin glargine (LANTUS PEN) injection 30 Units  30 Units Subcutaneous At Bedtime Chai España MD   30 Units at 11/01/24 2201    pantoprazole (PROTONIX)  EC tablet 40 mg  40 mg Oral BID Haley Joseph DPM, Podiatry/Foot and Ankle Surgery   40 mg at 11/02/24 1004    piperacillin-tazobactam (ZOSYN) 2.25 g vial to attach to  ml bag  2.25 g Intravenous Q8H Sherron Gamble  mL/hr at 11/02/24 0056 2.25 g at 11/02/24 1005    polyethylene glycol (MIRALAX) Packet 17 g  17 g Oral Daily Haley Joseph DPM, Podiatry/Foot and Ankle Surgery   17 g at 10/31/24 0843    sacubitril-valsartan (ENTRESTO) 49-51 MG per tablet 1 tablet  1 tablet Oral BID Haley Joseph DPM, Podiatry/Foot and Ankle Surgery   1 tablet at 11/02/24 1004    senna-docusate (SENOKOT-S/PERICOLACE) 8.6-50 MG per tablet 2 tablet  2 tablet Oral BID Sherron Gamble MD   2 tablet at 10/30/24 2131    sevelamer carbonate (RENVELA) tablet 800 mg  800 mg Oral TID w/meals Haley Joseph DPM, Podiatry/Foot and Ankle Surgery   800 mg at 11/02/24 1327    sodium chloride (PF) 0.9% PF flush 3 mL  3 mL Intracatheter Q8H Haley Joseph DPM, Podiatry/Foot and Ankle Surgery   3 mL at 11/02/24 1007    torsemide (DEMADEX) tablet 100 mg  100 mg Oral Daily Vega Guzman MD   100 mg at 11/02/24 1005    warfarin ANTICOAGULANT (COUMADIN) tablet 5 mg  5 mg Oral ONCE at 18:00 Margaret Kohler, [facility]        Warfarin Dose Required Daily - Pharmacist Managed  1 each Oral See Admin Instructions Chai España MD          No Known Allergies         Physical Exam:   Vitals were reviewed   , Blood pressure 116/74, pulse 92, temperature 97.7  F (36.5  C), temperature source Oral, resp. rate 18, weight 86.1 kg (189 lb 13.1 oz), SpO2 97%.    Wt Readings from Last 3 Encounters:   11/02/24 86.1 kg (189 lb 13.1 oz)   10/15/24 78.6 kg (173 lb 4.5 oz)   10/08/24 78.7 kg (173 lb 8 oz)       Intake/Output Summary (Last 24 hours) at 11/2/2024 1429  Last data filed at 11/2/2024 1330  Gross per 24 hour   Intake 240 ml   Output 375 ml   Net -135 ml       GENERAL APPEARANCE: pleasant, NAD, alert  HEENT:  Eyes/ears/nose/neck grossly  normal  RESP: No wheezes.   CV: RRR  ABDOMEN: o/s/nt/nd, bs present  EXTREMITIES/SKIN: no rashes/lesions on observed skin; no edema  NEURO: Awake, alert and conversing normally         Data:     CBC RESULTS:     Recent Labs   Lab 11/02/24  0648 11/01/24  0713 10/31/24  0711 10/30/24  0721 10/29/24  0914 10/28/24  0827   WBC 11.0 14.1* 13.1* 10.8 10.8  --    RBC 2.26* 2.22* 2.57* 2.11* 1.67*  --    HGB 7.4* 7.3* 8.5* 7.1* 5.9*  --    HCT 22.5* 21.1* 24.2* 20.6* 17.2*  --     362 317 311 310 262       Basic Metabolic Panel:  Recent Labs   Lab 11/02/24  1327 11/02/24  0939 11/02/24  0648 11/02/24  0208 11/01/24  2159 11/01/24  1710 11/01/24  0913 11/01/24  0713 10/31/24  0856 10/31/24  0711 10/30/24  0826 10/30/24  0721 10/29/24  0900 10/29/24  0643 10/28/24  0835 10/28/24  0827   NA  --   --  132*  --   --   --   --  133*  --  132*  --  134*  --  132*  --  136   POTASSIUM  --   --  4.0  --   --   --   --  4.2  --  4.0  --  4.3  --  4.1  --  4.5   CHLORIDE  --   --  92*  --   --   --   --  94*  --  94*  --  93*  --  94*  --  96*   CO2  --   --  24  --   --   --   --  26  --  28  --  28  --  28  --  28   BUN  --   --  54.9*  --   --   --   --  55.7*  --  55.6*  --  57.5*  --  50.8*  --  49.7*   CR  --   --  7.58*  --   --   --   --  7.64*  --  7.56*  --  7.60*  --  7.85*  --  7.97*   * 136* 207* 240* 183* 121*   < > 135*   < > 215*   < > 301*   < > 310*   < > 255*   TERENCE  --   --  9.4  --   --   --   --  9.8  --  9.3  --  9.2  --  8.6*  --  9.0    < > = values in this interval not displayed.       INR  Recent Labs   Lab 11/02/24  0648 11/01/24  0713 10/31/24  0711 10/30/24  0721   INR 2.48* 2.18* 1.69* 1.18*      Attestation:   I have reviewed today's relevant vital signs, notes, medications, labs and imaging.    Star Acuna MD  [facility] - Nephrology  Office phone :[phone]  Pager: [phone]

## 2024-11-02 NOTE — PLAN OF CARE
Date & Time: 11/1/24 6942-9387  Surgery/POD#: 8 Left Femoral Endartectomy, Left Femoral to Tibial peroneal Trunk Bypass w/ Left great sephaneous vein. Pod 5 Left great toe amputation    Behavior & Aggression: Green  Fall Risk: Yes  Orientation:A&O x4  ABNL VS/O2:VSS on RA, Tachy (101)  ABNL Labs: see results  Pain Management: PRNs available.  Bowel/Bladder: Continent of B&B. Urinal at bedside  Drains: 3 PIVs SL int antibiotics, Assisted pt with connecting self to PD machine.   Wounds/incisions: LLE incision with dressing small drainage. Left toe dressing  CDI, Left groin incision CDI. Peritoneal dialysis connected and on.  Diet:2 G na/Renal diet  Activity Level: Ax1, not oob this shift.  Tests/Procedures: n/a  Anticipated  DC Date: pending  Significant Information: Peritoneal dialysis initiated at 9 pm

## 2024-11-02 NOTE — PROGRESS NOTES
Goal Outcome Evaluation:       Plan of Care Reviewed With: patient     Overall Patient Progress: no changeOverall Patient Progress: no change     Shift: 11/1/24, 15:30-19:30  POD# 7 L femoral endartectomy, L femoral to tibial peroneal trunk bypass w/ L great saphenous vein POD# 4 L great toe amputation.     Orientation: A&Ox4  Vital Signs: VSS on RA  Labs: See orders  Pain Management: Denied pain meds this shift.   Bowel: No BM, passing gas  Bladder: Patient did not have much output, is on peritoneal dialysis  IV: PIV - SL  Wounds/Incisions: LLE incision covered. Left toe dressing CDI. Left groin incision dressing  was saturated, changed. Peritoneal dialysis clamped.   Diet: 2 gram Na  Activity Level: Ax1; not oob this shift  Anticipated Discharge Date/Plan: Pending  Significant Information: Dialysis machine was switched out and is ready to use this evening

## 2024-11-02 NOTE — PLAN OF CARE
Goal Outcome Evaluation:      Plan of Care Reviewed With: patient    Overall Patient Progress: no changeOverall Patient Progress: no change       Shift: 11/2/24, 2303-6230  POD# 8 L femoral endarterectomy, L femoral to tibial peroneal trunk bypass w/ L great saphenous vein POD# 5 L great toe amputation.     Orientation: A&Ox4  Vital Signs: VSS on RA  Labs: See orders  Pain Management: Denied pain meds this shift, 2/10 pain  Bowel: No BM, passing gas  Bladder: Patient did not have much output, is on peritoneal dialysis   IV: PIV - SL; intermittent zosyn  Wounds/Incisions: LLE incision, changed x1. Left toe dressing. Left groin incision dressing. Peritoneal dialysis clamped.   Diet: 2 gram Na  Activity Level: Ax1; not oob this shift  Anticipated Discharge Date/Plan: Pending

## 2024-11-02 NOTE — PROGRESS NOTES
Cycler set up per protocol and per treatment orders     Results from previous treatment:  Effluent color and clarity: clear yellow effluent   Total UF: 608ml  Initial Drain: 128ml  Avg Dwell: 1:20  Lost Dwell: 0:14    Cycler Serial Number: [id number]  Total Treatment Volume: [id number]  Total treatment time: 9 hrs  Fill Volume: 2000ml  Last Fill Volume:0ml  Heater ba.5% 6000mL;  Lot #: [id number];  Expiration Date:   Side Bag #1: 2.5% 6000mL;  Lot #: [id number];  Expiration Date:   Casette: [id number] exp 2028/10/27  Number of cycles including final fill: 5  Dwell Time: 1:22 minutes  Last Fill Volume: 0 ml  Initial Drain Alarm: 10ml  PD orders reviewed with Patient procedure and ESRD teaching done and questions answered.  Cycler ready for hook-up.    Report given to: Dominique Jeff RN [facility] consent form signed yes

## 2024-11-02 NOTE — PROGRESS NOTES
[facility]    Comfortable today.  Finishing nightly peritoneal cycler dialysis which has been working well.    Left groin incision dressing change.  Open areas which are unchanged.  No significant drainage or erythema.  Redressed with Aquacel Ag-fluffs.    Good Doppler signals in the left graft.    Laboratory: SCr= 7.5 (stable on peritoneal dialysis) K= 4.0 albumin= 2.2 Hgb= 7.4    WBC= 11.0    Impression: #1.  Widely patent left leg in situ bypass graft with good Doppler signals and flow.  Healing toe amputations per podiatry.     #2.  Groin wound has been reapproximated.  Still open areas.  Being treated with Aquacel Ag dressings.  Leukocytosis yesterday with WBC= 14.1.  Empirically started IV Zosyn due to multiple comorbidities and reoperation on groin.  White count is improved today.  Will follow clinically and plan antibiotics for several days.     #3.  Acute and chronic anemia.  On iron.     #4.  Clinical and laboratory test consistent with malnutrition.  Push protein supplements.      Patrick Hein MD

## 2024-11-02 NOTE — PROGRESS NOTES
Date & Time: 24 9094-3287  Summary: POD# 7 Left Femoral Endartectomy, Left Femoral to Tibial peroneal Trunk Bypass w/ Left great sephaneous vein. Pod 4 Left great toe amputation   Orientation/Cognitive: A&Ox4  Mobility Level/Assist Equipment: A1/GB/walker  Fall Risk (Y/N): Yes  Behavior Concerns: Green  Pain Management: Denies  Tele/VS/O2: VSS on RA  ABNL Lab/BG: NA: 33, WBC 14.1, Hgb 7.3, Phos 5.7, Creat. 7.64. B, 183 & 240.  Diet: Renal dialysis, 2 gram sodium diet  Bowel/Bladder: Continent. Passing gas and no BM during the shift.  Skin Concerns: LLE incision with dressing scant drainage. Left toe incision CDI, Left groin incision CDI. Peritoneal dialysis cath infused overnight  Drains/Devices: Right and left PIV SL  Tests/Procedures for next shift: None  Anticipated DC date & active delays: TBD    /75 (BP Location: Right arm, Patient Position: Semi-Soto's, Cuff Size: Adult Regular)   Pulse 99   Temp 97.8  F (36.6  C) (Oral)   Resp 18   Wt 86.1 kg (189 lb 13.1 oz)   SpO2 97%   BMI 25.99 kg/m

## 2024-11-03 LAB
ALBUMIN SERPL BCG-MCNC: 2 G/DL (ref 3.5–5.2)
ANION GAP SERPL CALCULATED.3IONS-SCNC: 15 MMOL/L (ref 7–15)
BUN SERPL-MCNC: 52.9 MG/DL (ref 8–23)
CALCIUM SERPL-MCNC: 9.5 MG/DL (ref 8.8–10.4)
CHLORIDE SERPL-SCNC: 93 MMOL/L (ref 98–107)
CREAT SERPL-MCNC: 7.41 MG/DL (ref 0.67–1.17)
EGFRCR SERPLBLD CKD-EPI 2021: 8 ML/MIN/1.73M2
GLUCOSE BLDC GLUCOMTR-MCNC: 177 MG/DL (ref 70–99)
GLUCOSE BLDC GLUCOMTR-MCNC: 189 MG/DL (ref 70–99)
GLUCOSE BLDC GLUCOMTR-MCNC: 205 MG/DL (ref 70–99)
GLUCOSE BLDC GLUCOMTR-MCNC: 209 MG/DL (ref 70–99)
GLUCOSE BLDC GLUCOMTR-MCNC: 216 MG/DL (ref 70–99)
GLUCOSE SERPL-MCNC: 216 MG/DL (ref 70–99)
HCO3 SERPL-SCNC: 26 MMOL/L (ref 22–29)
INR PPP: 2.46 (ref 0.85–1.15)
PHOSPHATE SERPL-MCNC: 5.4 MG/DL (ref 2.5–4.5)
PLATELET # BLD AUTO: 330 10E3/UL (ref 150–450)
POTASSIUM SERPL-SCNC: 4 MMOL/L (ref 3.4–5.3)
SODIUM SERPL-SCNC: 134 MMOL/L (ref 135–145)

## 2024-11-03 PROCEDURE — 250N000013 HC RX MED GY IP 250 OP 250 PS 637

## 2024-11-03 PROCEDURE — 250N000013 HC RX MED GY IP 250 OP 250 PS 637: Performed by: INTERNAL MEDICINE

## 2024-11-03 PROCEDURE — 250N000013 HC RX MED GY IP 250 OP 250 PS 637: Performed by: PODIATRIST

## 2024-11-03 PROCEDURE — 85610 PROTHROMBIN TIME: CPT | Performed by: INTERNAL MEDICINE

## 2024-11-03 PROCEDURE — 36415 COLL VENOUS BLD VENIPUNCTURE: CPT | Performed by: PODIATRIST

## 2024-11-03 PROCEDURE — 250N000011 HC RX IP 250 OP 636

## 2024-11-03 PROCEDURE — 85049 AUTOMATED PLATELET COUNT: CPT | Performed by: PODIATRIST

## 2024-11-03 PROCEDURE — 120N000001 HC R&B MED SURG/OB

## 2024-11-03 PROCEDURE — 90945 DIALYSIS ONE EVALUATION: CPT

## 2024-11-03 PROCEDURE — 99232 SBSQ HOSP IP/OBS MODERATE 35: CPT | Performed by: INTERNAL MEDICINE

## 2024-11-03 PROCEDURE — 82310 ASSAY OF CALCIUM: CPT | Performed by: PODIATRIST

## 2024-11-03 RX ORDER — GENTAMICIN SULFATE 1 MG/G
CREAM TOPICAL DAILY PRN
Status: DISCONTINUED | OUTPATIENT
Start: 2024-11-03 | End: 2024-11-07

## 2024-11-03 RX ORDER — WARFARIN SODIUM 5 MG/1
5 TABLET ORAL
Status: COMPLETED | OUTPATIENT
Start: 2024-11-03 | End: 2024-11-03

## 2024-11-03 RX ADMIN — TORSEMIDE 100 MG: 100 TABLET ORAL at 08:21

## 2024-11-03 RX ADMIN — SACUBITRIL AND VALSARTAN 1 TABLET: 49; 51 TABLET, FILM COATED ORAL at 21:40

## 2024-11-03 RX ADMIN — SACUBITRIL AND VALSARTAN 1 TABLET: 49; 51 TABLET, FILM COATED ORAL at 08:20

## 2024-11-03 RX ADMIN — PIPERACILLIN AND TAZOBACTAM 2.25 G: 2; .25 INJECTION, POWDER, FOR SOLUTION INTRAVENOUS at 10:16

## 2024-11-03 RX ADMIN — CARVEDILOL 6.25 MG: 6.25 TABLET, FILM COATED ORAL at 21:30

## 2024-11-03 RX ADMIN — CALCITRIOL CAPSULES 0.25 MCG 0.25 MCG: 0.25 CAPSULE ORAL at 21:29

## 2024-11-03 RX ADMIN — PANTOPRAZOLE SODIUM 40 MG: 40 TABLET, DELAYED RELEASE ORAL at 21:30

## 2024-11-03 RX ADMIN — INSULIN GLARGINE 35 UNITS: 100 INJECTION, SOLUTION SUBCUTANEOUS at 21:44

## 2024-11-03 RX ADMIN — CLOPIDOGREL BISULFATE 75 MG: 75 TABLET ORAL at 08:20

## 2024-11-03 RX ADMIN — SEVELAMER CARBONATE 800 MG: 800 TABLET, FILM COATED ORAL at 08:20

## 2024-11-03 RX ADMIN — SEVELAMER CARBONATE 800 MG: 800 TABLET, FILM COATED ORAL at 12:21

## 2024-11-03 RX ADMIN — ALLOPURINOL 200 MG: 100 TABLET ORAL at 21:29

## 2024-11-03 RX ADMIN — ATORVASTATIN CALCIUM 40 MG: 40 TABLET, FILM COATED ORAL at 21:40

## 2024-11-03 RX ADMIN — WARFARIN SODIUM 5 MG: 5 TABLET ORAL at 17:27

## 2024-11-03 RX ADMIN — PIPERACILLIN AND TAZOBACTAM 2.25 G: 2; .25 INJECTION, POWDER, FOR SOLUTION INTRAVENOUS at 01:03

## 2024-11-03 RX ADMIN — PANTOPRAZOLE SODIUM 40 MG: 40 TABLET, DELAYED RELEASE ORAL at 08:20

## 2024-11-03 RX ADMIN — PIPERACILLIN AND TAZOBACTAM 2.25 G: 2; .25 INJECTION, POWDER, FOR SOLUTION INTRAVENOUS at 17:27

## 2024-11-03 RX ADMIN — SEVELAMER CARBONATE 800 MG: 800 TABLET, FILM COATED ORAL at 17:33

## 2024-11-03 NOTE — PLAN OF CARE
DATE & TIME: 11/2/24 3pm-11pm    Cognitive Concerns/ Orientation : A&Ox4   BEHAVIOR & AGGRESSION TOOL COLOR: green   ABNL VS/O2: VSS/Room air  MOBILITY: Ax1/GB/Walker and post-op shoe on left foot  PAIN MANAGMENT: denies  DIET: 2gm NA, renal  BOWEL/BLADDER: continent, on peritoneal dialysis  ABNL LAB/BG:  & 159, Na 132, Creat 7.58, Hgb 7.4  DRAIN/DEVICES: X2 right PIVs, x1 left PIV  SKIN: left leg incision, left groin incision, left foot incision  TESTS/PROCEDURES: POD #8 left femoral endartectomy, POD #6 left great toe amp  D/C DATE: pending to TCU

## 2024-11-03 NOTE — PROGRESS NOTES
Vascular Surgery Progress Note  11/03/2024       Subjective:  - NAEON. Feeling well this morning. No fevers or chills.     Objective:  Temp:  [97.8  F (36.6  C)-98.7  F (37.1  C)] 98.4  F (36.9  C)  Pulse:  [100-106] 100  Resp:  [18] 18  BP: (112-119)/(76-82) 119/82  SpO2:  [98 %] 98 %    I/O last 3 completed shifts:  In: 820 [P.O.:720; I.V.:100]  Out: 475 [Urine:475]      Gen: Awake, alert, NAD  Resp: NLB on RA  Abd: soft, nondistended, nontender  Incision: Groin wound reapproximated with small openings. Minimal surrounding erythema. Redressed with aquacel AG and fluff  Ext: WWP, no edema, incisions c/d/I with minimal serous drainage on dressings. Biphasic DP/PT signals     Labs:  Recent Labs   Lab 11/03/24  0657 11/02/24  0648 11/01/24  0713 10/31/24  0711   WBC  --  11.0 14.1* 13.1*   HGB  --  7.4* 7.3* 8.5*    340 362 317       Recent Labs   Lab 11/03/24  1015 11/03/24  0813 11/03/24  0657 11/02/24  0939 11/02/24  0648 11/01/24  0913 11/01/24  0713   NA  --   --  134*  --  132*  --  133*   POTASSIUM  --   --  4.0  --  4.0  --  4.2   CHLORIDE  --   --  93*  --  92*  --  94*   CO2  --   --  26  --  24  --  26   BUN  --   --  52.9*  --  54.9*  --  55.7*   CR  --   --  7.41*  --  7.58*  --  7.64*   * 216* 216*   < > 207*   < > 135*   TERENCE  --   --  9.5  --  9.4  --  9.8   PHOS  --   --  5.4*  --  5.5*  --  5.7*    < > = values in this interval not displayed.       Imaging:  XR Surgery MOODY L/T 5 Min Fluoro w Stills   Final Result   IMPRESSION: Intraoperative digital subtraction angiography supply to   the vascular surgeon. Initial images demonstrate catheterization of   the left great saphenous vein with opacification of the superior   aspect of the left deep venous system. Prominent great saphenous vein   branch communicating with the femoral vein. Subsequent images   demonstrate antegrade flow within the distal aspect of the great   saphenous vein, which had been used as a femoral-TP trunk bypass    graft. There appears to be at least a two-vessel runoff.      SKYE NASSAR MD            SYSTEM ID:  [id number]      Cardiac Catheterization   Final Result      US Lower Extremity Arterial Duplex Left   Final Result   IMPRESSION:   1.  Prominent venous branch originating from the LLE bypass in the mid to distal thigh is functioning as an arterial venous fistula. The bypass is patent proximal to this branch with very low resistance waveforms. There is reversal of flow in the bypass    distal to this venous branch.   2.  The distal anastomosis of the bypass is unable to visualized with ultrasound.   3.  Proximal to mid bypass is patent. Elevated velocity in the proximal bypass may suggest stenosis.      X-ray lt Foot 3 vw port: In PACU   Final Result   IMPRESSION: Postop interval partial left great toe amputation through the neck of the proximal phalanx left great toe. Amputation margin is sharp without evidence for acute osteomyelitis at the amputation margin. Remainder is unchanged. Extensive    arterial calcification. No acute displaced left foot fracture or dislocation. Remaining joint spaces are unchanged.         NM Lexiscan stress test (nuc card)   Final Result      US Lower Extremity Venous Mapping Left   Final Result   IMPRESSION:   1. Left greater saphenous vein mapping as described.      CTA Abdomen Pelvis Runoff w Contrast   Final Result   CONCLUSION:   1.  Right leg: No inflow or femoropopliteal segment obstructions. Occluded anterior tibial artery origin with proximal reconstitution without distal stenoses. Normal caliber peroneal artery. Tandem severe stenosis of the distal posterior tibial artery.   2.  Left leg: No inflow obstruction. Focal calcified severe stenosis of the proximal superficial femoral artery. Long segment occlusion of the mid to distal popliteal artery with low attenuation changes which may represent thrombus or soft    atherosclerotic plaque. Reconstitution of the origins of the  posterior tibial arteries. Severe tandem stenoses of the distal posterior tibial artery.   3.  Moderate amount of free fluid. Pneumoperitoneum. This is likely secondary to the peritoneal dialysis catheter.   4.  Bilateral pleural effusions, greater on the right.   5.  4 mm hyperenhancing nodule within the lower pole the left kidney. This may represent a small primary renal cell carcinoma.      IR Lower Extremity Angiogram Left   Final Result   IMPRESSION:    Abandoned left lower extremity angiogram due to limited arterial   access options, as above. No intervention performed.      PLAN:    - Recommend CTA abdomen/pelvis runoff for further evaluation of   disease and to guide procedure planning. LLE intervention may require   brachial access and/or combined surgical/endovascular approach.   - The above was discussed with vascular surgery team, Dr. Hein, who   is in agreement.      PEYMAN HERBERT MD            SYSTEM ID:  [id number]      MR Foot Left w/o Contrast   Final Result   Impression:      1. Distal phalanx of the first digit appears pointed distally with   replacement of normal fatty marrow and a soft tissue defect at the   distal tuft. About 14 mm of the distal phalanx appear normal with   normal fat signal and without significant edema. Constellation of   findings favors gangrene.   2. No other osseous abnormalities to suggest osteomyelitis.   3. Degenerative changes of the interphalangeal joint and the   metatarsophalangeal joint of the first digit.   4. Widespread edema throughout the musculature as can be seen in   microvascular disease associated with diabetes.      JUTTA ELLERMANN, MD            SYSTEM ID:  [id number]      US Lower Extremity Arterial Duplex Left   Final Result   IMPRESSION:    1.  Heavily calcified atherosclerotic disease throughout the left   lower extremity obscuring portions of the underlying vasculature.   Waveforms suggest a severe proximal superficial femoral artery   stenosis with  a likely distal superficial femoral artery stenosis also   seen. Severely abnormal waveforms in the infrapopliteal vessels   suggest severe disease in these vessels as well. Consider CTA with   runoff for further evaluation.      EMERSON HURTADO MD            SYSTEM ID:  [id number]           Assessment/Plan:   64 year old male POD9 s/p fem-TP trunk bypass with takeback to OR for ligation of venous sidebranches. Doing well post-operatively. Groin incision healing.    - Dressing change daily for groin with surgery team  - Continue zosyn  - Continue warfarin, plavix  - Encourage gentle ambulation     - - - - - - - - - - - - - - - - - -  Milo Carrion MD  Vascular Surgery Resident

## 2024-11-03 NOTE — PROGRESS NOTES
[facility]    Internal Medicine Hospitalist Progress Note  11/03/2024  I evaluated patient on the above date.    Ed Bonilla Jr., MD  [phone] (p)  Text Page  Vocera      [New actions/orders today (11/03/2024) are underlined in the assessment and plan. All lab results in the assessment and plan were reviewed.]  Assessment & Plan   Mr. Arnulfo Pritchett is a 62 yo male with history including DM2; HTN; CHF (EF in the 20's); CAD; PAD with prior revascularization; ESRD on PD; gout; HLD; TALI (intolerant to CPAP); and h/o RCC s/p right nephrectomy (2022). He presented to [facility] 10/15/2024 with worsening pain and redness in the left great toe with streaking up the leg. There was concern for limb ischemia and transferred to [facility] 10/16/2024 for management.      # Critical limb ischemia of the left lower extremity.  # S/p left femoral endarterectomy with bovine pericardial patch angioplasty; left distal common femoral artery/proximal superficial femoral artery to tibioperoneal trunk bypass; and temporary closure of left groin wound with wound VAC 10/25/2024.  # S/p ligation of side branches of the left great saphenous vein and temporary closure of right groin with application of wound VAC 10/27/2024.  # Ischemic left great toe wound - s/p amputation of left great toe 10/27/2024.  # Possible right groin wound infection.  # H/o acute RLE arterial occlusion s/p SFA popliteal balloon angioplasty and stenting (5/2024).  * Initial presentation to OS 10/15 as above. On initial evaluation, pt was afebrile, hemodynamically stable. Labs notable for CBC with WBC normal, hgb 12.5; cr 9.84 (chronic PD); CRP 55.81; INR 4.42. Transferred to [facility] 10/16.  * Started on piperacillin-tazobactam on admit. Vascular surgery and podiatry consulted. Warfarin held on admit (in part for supratherapeutic INR).  * 10/16: MRI left foot  showed constellation of findings favoring gangrene; no other osseous abnormalities to  suggest osteomyelitis; degenerative changes.  * 10/17: IR left lower extremity angiogram 10/17 abandoned due to limited arterial access options.  CTA 10/17 showed significant bilateral PAD including left lower extremity focal calcified severe stenosis of the proximal superficial femoral artery, long segment occlusion of the mid to distal popliteal artery with low attenuation changes which may represent thrombus or soft atherosclerotic plaque, reconstitution of the origins of the posterior tibial arteries and severe tandem stenoses of the distal posterior tibial artery.  * 10/20: Started heparin gtt.  * 10/22: ID consulted. Piperacillin-tazobactam discontinued and changed to amoxicillin-clavulanate.  * 10/25: Underwent left lower extremity bypass as above.  * 10/27: Underwent left great toe amputation as above. Later noted a distinct loss of signals on a.m. rounds; duplex sonography showed flow reversal in the mid=graft with paucity of flow going beyond mid-graft. Taken urgently for surgery as above.  * 10/29: Warfarin restarted. Clopidogrel restarted (had been on ASA this hospitalization). Stopped amoxicillin-clavulanate.  * 11/1: Heparin gtt stopped. Restarted on piperacillin-tazobactam by vascular for possible left groin wound infection.  Recent Labs   Lab 11/03/24  0657 11/02/24  0648 11/01/24  0713 10/31/24  0711 10/30/24  0721 10/29/24  0914   INR 2.46* 2.48* 2.18* 1.69* 1.18* 1.13   - Post-op management per vascular surgery.  - Continue piperacillin-tazobactam (re-started 11/1) per vascular.  - Continue clopidogrel and atorvastatin.  - Continue warfarin.  - Continue PT and OT - plan TCU.  - Pain management as noted below.  - Appreciate consultant help.      Pain control.  * This hospitalization, noted that patient wanted low-dose pain meds for pain control.  * Ultimately started on low dose PRN opioids (see prior notes).  - Continue PRN oxycodone 2.5-5 mg q4h; PRN IV hydromorphone 0.1-0.2 mg q2h; minimize  opioids as able.  - Continue bowel regimen to reduce risk of constipation as noted.     Ischemic CM (HFrEF).  CAD with h/o stents x3 (last in 2021).   Hypertension (benign essential).  HLD.  [PTA BP meds: carvedilol 12.5 mg at bedtime; sacubitril-valsartan 49-51 mg BID; torsemide 100 mg qam.]  * Echo 6/2024 showed LVEF 20-25%, segmental wall motion globally hypokinetic.   * Seen by cardiology this hospitalization.  * Nuc stress test 10/22 was abnormal but no evidence of ischemia; medium sized area of infarction in the entire inferolateral segment(s) of the left ventricle extending into basal inferior segment; small area of nontransmural infarction in the apical segment(s) of the left ventricle; TID absent; left ventricular ejection fraction at stress 26%.  - Continue atorvastatin and clopidogrel.  - Continue carvedilol, sacubitril-valsartan and torsemide.  - Continue to monitor daily wts.  - Additional volume management via PD as noted.    Chronic paroxysmal afib s/p atrial ablation (6/2023).  - Continue carvedilol.  - Continue anticoagulation as above.    Acute blood loss anemia on chronic anemia.  * Hgb 10.9 on admit and with gradual drop in hgb in setting of above surgeries.  Recent Labs   Lab 11/02/24  0648 11/01/24  0713 10/31/24  0711 10/30/24  0721 10/29/24  0914   HGB 7.4* 7.3* 8.5* 7.1* 5.9*   - Continue to monitor CBC periodically as needed.  - Consider prbc transfusion if hgb </= 7.0 or if significant bleeding with hemodynamic instability or if symptomatic.    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  Secondary hyperparathyroidism  Hyperkalemia, resolved.  * PTA on nightly PD of which he has been tolerating. Access via RLQ double cuffed coiled PD catheter placed 4/16/2021.   * Nephrology consulted for PD.  * Treated with sodium zirconium cyclosilicate for hyperkalemia this hospitalization.  * K normalized 10/17.  - Continue PD per nephrology.  - Continue calcitriol, cinacalcet and sevelamer carbonate.  - Continue  every other day gentamicin cream to PD cath site.  - Appreciate nephrology help.     Hyperenhancing left lower pole renal nodule on CT.  Hx of RCC s/p R nephrectomy (5/2022).  *  CTA 10/17 also showed a 4 mm hyperenhancing nodule in lower pole of the left kidney, noted  that this may represent a small primary renal cell carcinoma.  * Pt made aware of the above finding.  - Pt is aware of this finding and will follow-up closely with his urologist after discharge.     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  [PTA: glargine 39U at bedtime.]  * Hgba1c 5.7 10/16/2024.  * Pt declined diabetic diet this hospitalization.  Recent Labs   Lab 11/03/24  1015 11/03/24  0813 11/03/24  0657 11/02/24  2109 11/02/24  1754 11/02/24  1327   * 216* 216* 159* 160* 156*     Recent Labs   Lab Test 10/16/24  0642 07/09/24  0914   A1C 5.7* 5.9*   - Continue carb controlled diet (pt refuses diabetic diet).  - Increase glargine 30U --> 35U at bedtime.  - Continue aspart ISS (high).     Constipation.  * Noted that pt struggles with constipation at times.  - Continue scheduled polyethylene glycol and senna-docusate PRN senna-docusate; PRN bisacodyl suppository.    Gout.  - Continue allopurinol.    GERD.  - Continue PPI.    TALI.  - Noted intolerant to CPAP.  - Continue to monitor clinically.    Clinically Significant Risk Factors         # Hyponatremia: Lowest Na = 132 mmol/L in last 2 days, will monitor as appropriate  # Hypochloremia: Lowest Cl = 92 mmol/L in last 2 days, will monitor as appropriate   # Hypercalcemia: corrected calcium is >10.1, will monitor as appropriate    # Hypoalbuminemia: Lowest albumin = 2 g/dL at 11/3/2024  6:57 AM, will monitor as appropriate     # Hypertension: Noted on problem list                 # Financial/Environmental Concerns: none         Diet: Combination Diet Renal Diet (dialysis); 2 gm NA Diet  Snacks/Supplements Adult: Glucerna; Between Meals    Prophylaxis: PCD's, ambulation, and is on  warfarin.  Rosario Catheter: Not present  Lines: None     Code Status: Full Code    Disposition Plan   Medically Ready for Discharge: Anticipated in 2-4 Days  Expected discharge to transitional care unit (TCU) pending stability/improvement/resolution of above acute issues and consultant evaluation/recommendations/clearance.    Entered: Ed Bonilla MD 11/03/2024, 12:02 PM       Interval History   Left groin dressing changed earlier by vascular.  Feeling a bit better overall.  Is going to try to increase activity today.    * Data reviewed today: I reviewed all new labs and imaging over the last 24 hours. I personally reviewed no images or ECG's today.    Physical Exam   Most recent vitals:   , Blood pressure 119/82, pulse 100, temperature 98.4  F (36.9  C), temperature source Oral, resp. rate 18, weight 79.1 kg (174 lb 6.1 oz), SpO2 98%. O2 Device: None (Room air)    Vitals:    11/01/24 2148 11/02/24 0616 11/03/24 0500   Weight: 78.4 kg (172 lb 13.5 oz) 86.1 kg (189 lb 13.1 oz) 79.1 kg (174 lb 6.1 oz)     Vital signs with ranges:  Temp:  [97.8  F (36.6  C)-98.7  F (37.1  C)] 98.4  F (36.9  C)  Pulse:  [100-106] 100  Resp:  [18] 18  BP: (112-119)/(76-82) 119/82  SpO2:  [98 %] 98 %  Patient Vitals for the past 24 hrs:   BP Temp Temp src Pulse Resp SpO2 Weight   11/03/24 0730 119/82 98.4  F (36.9  C) Oral 100 18 98 % --   11/03/24 0500 -- -- -- -- -- -- 79.1 kg (174 lb 6.1 oz)   11/03/24 0135 112/76 98.7  F (37.1  C) Oral 102 18 98 % --   11/02/24 1559 117/78 97.8  F (36.6  C) Oral 105 18 98 % --   11/02/24 1557 -- (P) 97.8  F (36.6  C) (P) Oral (P) 106 (P) 18 -- --     I/O's last 24 hours:  I/O last 3 completed shifts:  In: 820 [P.O.:720; I.V.:100]  Out: 475 [Urine:475]    Constitutional: awake, alert, oriented, pleasant, conversant   Head:   Eyes:   ENT:   Neck:   Cardiovascular: regular rate and rhythm; no murmurs, rubs or gallops  Lungs: diminished in the bases, no crackles or  wheezes  Gastrointestinal/Abdomen: soft, non-tender, non-distended, positive bowel sounds  :   Musculoskeletal:   Skin/Extremities:   Neurologic:   Psychiatric:   Hematologic/Lymphatic/Immunologic:        Labs reviewed.  Recent Labs   Lab 11/03/24  1015 11/03/24  0813 11/03/24  0657 11/02/24  0939 11/02/24  0648 11/01/24  0913 11/01/24  0713 10/31/24  0856 10/31/24  0711   WBC  --   --   --   --  11.0  --  14.1*  --  13.1*   HGB  --   --   --   --  7.4*  --  7.3*  --  8.5*   MCV  --   --   --   --  100  --  95  --  94   PLT  --   --  330  --  340  --  362  --  317   INR  --   --  2.46*  --  2.48*  --  2.18*  --  1.69*   NA  --   --  134*  --  132*  --  133*  --  132*   POTASSIUM  --   --  4.0  --  4.0  --  4.2  --  4.0   CHLORIDE  --   --  93*  --  92*  --  94*  --  94*   CO2  --   --  26  --  24  --  26  --  28   BUN  --   --  52.9*  --  54.9*  --  55.7*  --  55.6*   CR  --   --  7.41*  --  7.58*  --  7.64*  --  7.56*   ANIONGAP  --   --  15  --  16*  --  13  --  10   TERENCE  --   --  9.5  --  9.4  --  9.8  --  9.3   * 216* 216*   < > 207*   < > 135*   < > 215*   ALBUMIN  --   --  2.0*  --  2.2*  --  2.3*  --  2.2*    < > = values in this interval not displayed.     No lab results found.  Recent Labs   Lab 11/03/24  1015 11/03/24  0813 11/03/24  0657 11/02/24  2109 11/02/24  1754 11/02/24  1327   * 216* 216* 159* 160* 156*     Recent Labs   Lab Test 10/16/24  0642 07/09/24  0914   A1C 5.7* 5.9*       Recent Labs   Lab 11/03/24  0657 11/02/24  0648 11/01/24  0713 10/31/24  0711 10/30/24  0721 10/29/24  0914   INR 2.46* 2.48* 2.18* 1.69* 1.18* 1.13     Recent Labs   Lab 11/02/24  0648 11/01/24  0713 10/31/24  0711 10/30/24  0721 10/29/24  0914   WBC 11.0 14.1* 13.1* 10.8 10.8       MICRO:  Cultures (including blood and urine):  No lab results found in last 7 days.    No results found for this or any previous visit (from the past 24 hours).    Medications   All medications were reviewed.  MAR.    Infusions:  Current Facility-Administered Medications   Medication Dose Route Frequency Provider Last Rate Last Admin    dianeal PD LOW calcium-2.5% dex (calcium 2.5 mEq/L) 6,000 mL PERITONEAL DIALYSATE   Dialysis [facility] Supplied PD Vega Guzman MD        Patient is already receiving anticoagulation with heparin, enoxaparin (LOVENOX), warfarin (COUMADIN)  or other anticoagulant medication   Does not apply Continuous PRN Haley Joseph DPM, Podiatry/Foot and Ankle Surgery         Scheduled Medications:  Current Facility-Administered Medications   Medication Dose Route Frequency Provider Last Rate Last Admin    allopurinol (ZYLOPRIM) tablet 200 mg  200 mg Oral QPM Haley Joseph DPM, Podiatry/Foot and Ankle Surgery   200 mg at 11/02/24 2105    atorvastatin (LIPITOR) tablet 40 mg  40 mg Oral At Bedtime Haley Joseph DPM, Podiatry/Foot and Ankle Surgery   40 mg at 11/02/24 2106    calcitRIOL (ROCALTROL) capsule 0.25 mcg  0.25 mcg Oral At Bedtime Haley Joseph DPM, Podiatry/Foot and Ankle Surgery   0.25 mcg at 11/02/24 2106    carvedilol (COREG) tablet 6.25 mg  6.25 mg Oral At Bedtime Haley Joseph DPM, Podiatry/Foot and Ankle Surgery   6.25 mg at 11/02/24 2106    cinacalcet (SENSIPAR) tablet 60 mg  60 mg Oral Once per day on Monday Wednesday Friday Haley Joseph DPM, Podiatry/Foot and Ankle Surgery   60 mg at 11/01/24 0822    clopidogrel (PLAVIX) tablet 75 mg  75 mg Oral Daily Chai España MD   75 mg at 11/03/24 0820    insulin aspart (NovoLOG) injection (RAPID ACTING)  1-10 Units Subcutaneous TID AC Chai España MD   1 Units at 11/02/24 1821    insulin aspart (NovoLOG) injection (RAPID ACTING)  1-7 Units Subcutaneous At Bedtime Chai España MD   2 Units at 10/30/24 2133    insulin glargine (LANTUS PEN) injection 30 Units  30 Units Subcutaneous At Bedtime Chai España MD   30 Units at 11/02/24 2106    pantoprazole (PROTONIX) EC tablet 40 mg  40 mg Oral BID Haley Joseph,  ANGELITA, Podiatry/Foot and Ankle Surgery   40 mg at 11/03/24 0820    piperacillin-tazobactam (ZOSYN) 2.25 g vial to attach to  ml bag  2.25 g Intravenous Q8H Sherron Gamble  mL/hr at 11/03/24 0103 2.25 g at 11/03/24 1016    polyethylene glycol (MIRALAX) Packet 17 g  17 g Oral Daily Haley Joseph DPM, Podiatry/Foot and Ankle Surgery   17 g at 10/31/24 0843    sacubitril-valsartan (ENTRESTO) 49-51 MG per tablet 1 tablet  1 tablet Oral BID Haley Joseph DPM, Podiatry/Foot and Ankle Surgery   1 tablet at 11/03/24 0820    senna-docusate (SENOKOT-S/PERICOLACE) 8.6-50 MG per tablet 2 tablet  2 tablet Oral BID Sherron Gamble MD   2 tablet at 10/30/24 2131    sevelamer carbonate (RENVELA) tablet 800 mg  800 mg Oral TID w/meals Haley Joseph DPM, Podiatry/Foot and Ankle Surgery   800 mg at 11/03/24 0820    sodium chloride (PF) 0.9% PF flush 3 mL  3 mL Intracatheter Q8H Haley Joseph DPM, Podiatry/Foot and Ankle Surgery   3 mL at 11/03/24 1016    torsemide (DEMADEX) tablet 100 mg  100 mg Oral Daily Vega Guzman MD   100 mg at 11/03/24 0821    warfarin ANTICOAGULANT (COUMADIN) tablet 5 mg  5 mg Oral ONCE at 18:00 Margaret Kohler, [facility]        Warfarin Dose Required Daily - Pharmacist Managed  1 each Oral See Admin Instructions Chai España MD         PRN Medications:  Current Facility-Administered Medications   Medication Dose Route Frequency Provider Last Rate Last Admin    acetaminophen (TYLENOL) tablet 650 mg  650 mg Oral Q4H PRN Haley Joseph DPM, Podiatry/Foot and Ankle Surgery   650 mg at 10/31/24 2112    bisacodyl (DULCOLAX) suppository 10 mg  10 mg Rectal Daily PRN Jake, Haley J, DPM, Podiatry/Foot and Ankle Surgery        calcium carbonate (TUMS) chewable tablet 1,000 mg  1,000 mg Oral 4x Daily PRN Haley Joseph DPM, Podiatry/Foot and Ankle Surgery        glucose gel 15-30 g  15-30 g Oral Q15 Min PRN Haley Joseph DPM, Podiatry/Foot and Ankle Surgery        Or    dextrose 50 %  injection 25-50 mL  25-50 mL Intravenous Q15 Min PRN Haley Joseph DPM, Podiatry/Foot and Ankle Surgery        Or    glucagon injection 1 mg  1 mg Subcutaneous Q15 Min PRN Haley Joseph DPM, Podiatry/Foot and Ankle Surgery        dianeal PD LOW calcium-2.5% dex (calcium 2.5 mEq/L) 6,000 mL PERITONEAL DIALYSATE   Dialysis [facility] Supplied PD Vega Guzman MD        gentamicin (GARAMYCIN) 0.1 % cream   Topical Daily PRN Vega Guzman MD        HYDROmorphone (DILAUDID) injection 0.1 mg  0.1 mg Intravenous Q2H PRN Haley Joseph DPM, Podiatry/Foot and Ankle Surgery        Or    HYDROmorphone (DILAUDID) injection 0.2 mg  0.2 mg Intravenous Q2H PRN Haley Joseph DPM, Podiatry/Foot and Ankle Surgery        lidocaine (LMX4) cream   Topical Q1H PRN Haley Joseph DPM, Podiatry/Foot and Ankle Surgery        lidocaine 1 % 0.1-1 mL  0.1-1 mL Other Q1H PRN Haley Joseph DPM, Podiatry/Foot and Ankle Surgery        magnesium hydroxide (MILK OF MAGNESIA) suspension 30 mL  30 mL Oral Daily PRN Haley Joseph DPM, Podiatry/Foot and Ankle Surgery        naloxone (NARCAN) injection 0.2 mg  0.2 mg Intravenous Q2 Min PRN Haley Joseph DPM, Podiatry/Foot and Ankle Surgery        Or    naloxone (NARCAN) injection 0.4 mg  0.4 mg Intravenous Q2 Min PRN Haley Joseph DPM, Podiatry/Foot and Ankle Surgery        Or    naloxone (NARCAN) injection 0.2 mg  0.2 mg Intramuscular Q2 Min PRN Haley Joseph DPM, Podiatry/Foot and Ankle Surgery        Or    naloxone (NARCAN) injection 0.4 mg  0.4 mg Intramuscular Q2 Min PRN Haley Joseph DPM, Podiatry/Foot and Ankle Surgery        ondansetron (ZOFRAN ODT) ODT tab 4 mg  4 mg Oral Q6H PRN Haley Joseph DPM, Podiatry/Foot and Ankle Surgery        Or    ondansetron (ZOFRAN) injection 4 mg  4 mg Intravenous Q6H PRN Haley Joseph DPM, Podiatry/Foot and Ankle Surgery   4 mg at 11/01/24 0919    oxyCODONE IR (ROXICODONE) half-tab 2.5 mg  2.5 mg Oral Q4H PRN Haley Joseph DPM,  Podiatry/Foot and Ankle Surgery        Or    oxyCODONE (ROXICODONE) tablet 5 mg  5 mg Oral Q4H PRHaley Mcclure DPM, Podiatry/Foot and Ankle Surgery   5 mg at 10/31/24 2307    Patient is already receiving anticoagulation with heparin, enoxaparin (LOVENOX), warfarin (COUMADIN)  or other anticoagulant medication   Does not apply Continuous PRN Haley Joseph DPM, Podiatry/Foot and Ankle Surgery        prochlorperazine (COMPAZINE) injection 5 mg  5 mg Intravenous Q6H PRHaley Mcclure DPM, Podiatry/Foot and Ankle Surgery        Or    prochlorperazine (COMPAZINE) tablet 5 mg  5 mg Oral Q6H PRHaley Mcclure DPM, Podiatry/Foot and Ankle Surgery        senna-docusate (SENOKOT-S/PERICOLACE) 8.6-50 MG per tablet 1 tablet  1 tablet Oral BID PRHaley Mcclure DPM, Podiatry/Foot and Ankle Surgery   1 tablet at 10/27/24 1116    Or    senna-docusate (SENOKOT-S/PERICOLACE) 8.6-50 MG per tablet 2 tablet  2 tablet Oral BID PRHaley Mcclure DPM, Podiatry/Foot and Ankle Surgery        sodium chloride (PF) 0.9% PF flush 3 mL  3 mL Intracatheter q1 min prHaley Mcclure DPM, Podiatry/Foot and Ankle Surgery   3 mL at 10/27/24 1047

## 2024-11-03 NOTE — PLAN OF CARE
Date & Time: 11/3/2024 [id number]  Surgery/POD#: 10 Left Femoral Endartectomy, Left Femoral to Tibial peroneal Trunk Bypass w/ Left great sephaneous vein. Pod 7 Left great toe amputation    Behavior & Aggression: Green  Fall Risk: Yes  Orientation: A&Ox4  ABNL VS/O2:VSS RA (Tachy: 102)  ABNL Labs: see results  Pain Management:Denies Pain, PRNs available  Bowel/Bladder: Continent of B&B, Urinal at bedside  Drains: 3 PIVs SL, Int antibiotics  Wounds/incisions: LLE incision, L Groin incision, Left toe dressing  Diet:2 G na/ Renal  Activity Level: A1 GB&W  Anticipated  DC Date: Pending to TCU  Toe and Leg dressings due to be changed.

## 2024-11-03 NOTE — PROGRESS NOTES
Cycler set up per protocol and per treatment orders     Results from previous treatment:  Effluent color and clarity: yellow, clear  Total UF: 40ml  Initial Drain: 51ml  Avg Dwell: 1:14  Lost Dwell: 43min    Cycler Serial Number: [id number]  Total Treatment Volume: 10,000mL  Total treatment time: 9 hrs  Fill Volume: 2000ml  Last Fill Volume:0ml  Heater ba.5% 6000mL;  Lot #: [id number];  Expiration Date: 2026  Side Bag #1: 2.5% 6000mL;  Lot #: [id number];  Expiration Date: 2026  Dwell Time: 1:22 minutes  Initial Drain Alarm: 0ml  PD orders reviewed with Patient procedure and ESRD teaching done and questions answered.  Cycler ready for hook-up.    Report given to: ZULEMA Monahan RN

## 2024-11-03 NOTE — PROGRESS NOTES
Renal Medicine Progress Note            Assessment/Plan:     # ESRD on PD  # Severe LLE PAD s/p angiogram  # HTN  # Anemia: Hgb is below target  # CKD-MBD:               -calcitriol               -Sensipar               -Renvela     Plan:  # Same PD order placed.   # No need to check daily renal panel-can check renal panel every few days while here.          Interval History:     He says he can't lift up the left LE and very pain when he gets up.    PD is doing  UF ~ 0 with 2.5% dextrose.           Medications and Allergies:     Current Facility-Administered Medications   Medication Dose Route Frequency Provider Last Rate Last Admin    allopurinol (ZYLOPRIM) tablet 200 mg  200 mg Oral QPM Haley Joseph DPM, Podiatry/Foot and Ankle Surgery   200 mg at 11/02/24 2105    atorvastatin (LIPITOR) tablet 40 mg  40 mg Oral At Bedtime Haley Joseph DPM, Podiatry/Foot and Ankle Surgery   40 mg at 11/02/24 2106    calcitRIOL (ROCALTROL) capsule 0.25 mcg  0.25 mcg Oral At Bedtime Haley Joseph DPM, Podiatry/Foot and Ankle Surgery   0.25 mcg at 11/02/24 2106    carvedilol (COREG) tablet 6.25 mg  6.25 mg Oral At Bedtime Haley Joseph DPM, Podiatry/Foot and Ankle Surgery   6.25 mg at 11/02/24 2106    cinacalcet (SENSIPAR) tablet 60 mg  60 mg Oral Once per day on Monday Wednesday Friday Haley Joseph DPM, Podiatry/Foot and Ankle Surgery   60 mg at 11/01/24 0822    clopidogrel (PLAVIX) tablet 75 mg  75 mg Oral Daily Chai España MD   75 mg at 11/03/24 0820    insulin aspart (NovoLOG) injection (RAPID ACTING)  1-10 Units Subcutaneous TID AC Chai España MD   1 Units at 11/02/24 1821    insulin aspart (NovoLOG) injection (RAPID ACTING)  1-7 Units Subcutaneous At Bedtime Chai España MD   2 Units at 10/30/24 2133    insulin glargine (LANTUS PEN) injection 35 Units  35 Units Subcutaneous At Bedtime Ed Bonilla MD        pantoprazole (PROTONIX) EC tablet 40 mg  40 mg Oral BID Haley Joseph DPM,  Podiatry/Foot and Ankle Surgery   40 mg at 11/03/24 0820    piperacillin-tazobactam (ZOSYN) 2.25 g vial to attach to  ml bag  2.25 g Intravenous Q8H Sherron Gamble  mL/hr at 11/03/24 0103 2.25 g at 11/03/24 1016    polyethylene glycol (MIRALAX) Packet 17 g  17 g Oral Daily Haley Joseph DPM, Podiatry/Foot and Ankle Surgery   17 g at 10/31/24 0843    sacubitril-valsartan (ENTRESTO) 49-51 MG per tablet 1 tablet  1 tablet Oral BID Haley Joseph DPM, Podiatry/Foot and Ankle Surgery   1 tablet at 11/03/24 0820    senna-docusate (SENOKOT-S/PERICOLACE) 8.6-50 MG per tablet 2 tablet  2 tablet Oral BID Sherron Gamble MD   2 tablet at 10/30/24 2131    sevelamer carbonate (RENVELA) tablet 800 mg  800 mg Oral TID w/meals Haley Joseph DPM, Podiatry/Foot and Ankle Surgery   800 mg at 11/03/24 1221    sodium chloride (PF) 0.9% PF flush 3 mL  3 mL Intracatheter Q8H Haley Joseph DPM, Podiatry/Foot and Ankle Surgery   3 mL at 11/03/24 1016    torsemide (DEMADEX) tablet 100 mg  100 mg Oral Daily Vega Guzman MD   100 mg at 11/03/24 0821    warfarin ANTICOAGULANT (COUMADIN) tablet 5 mg  5 mg Oral ONCE at 18:00 Margaret Kohler, [facility]        Warfarin Dose Required Daily - Pharmacist Managed  1 each Oral See Admin Instructions Chai España MD          No Known Allergies         Physical Exam:   Vitals were reviewed   , Blood pressure 119/82, pulse 100, temperature 98.4  F (36.9  C), temperature source Oral, resp. rate 18, weight 79.1 kg (174 lb 6.1 oz), SpO2 98%.    Wt Readings from Last 3 Encounters:   11/03/24 79.1 kg (174 lb 6.1 oz)   10/15/24 78.6 kg (173 lb 4.5 oz)   10/08/24 78.7 kg (173 lb 8 oz)       Intake/Output Summary (Last 24 hours) at 11/3/2024 1338  Last data filed at 11/3/2024 1257  Gross per 24 hour   Intake 1420 ml   Output 350 ml   Net 1070 ml     GENERAL APPEARANCE: pleasant, NAD, alert  HEENT:  Eyes/ears/nose/neck grossly normal  RESP: No wheezes.   CV: RRR  ABDOMEN: o/s/nt/nd,  bs present  EXTREMITIES/SKIN: no rashes/lesions on observed skin; no edema  NEURO: Awake, alert and conversing normally         Data:     CBC RESULTS:     Recent Labs   Lab 11/03/24  0657 11/02/24  0648 11/01/24  0713 10/31/24  0711 10/30/24  0721 10/29/24  0914   WBC  --  11.0 14.1* 13.1* 10.8 10.8   RBC  --  2.26* 2.22* 2.57* 2.11* 1.67*   HGB  --  7.4* 7.3* 8.5* 7.1* 5.9*   HCT  --  22.5* 21.1* 24.2* 20.6* 17.2*    340 362 317 311 310       Basic Metabolic Panel:  Recent Labs   Lab 11/03/24  1223 11/03/24  1015 11/03/24  0813 11/03/24  0657 11/02/24  2109 11/02/24  1754 11/02/24  0939 11/02/24  0648 11/01/24  0913 11/01/24  0713 10/31/24  0856 10/31/24  0711 10/30/24  0826 10/30/24  0721 10/29/24  0900 10/29/24  0643   NA  --   --   --  134*  --   --   --  132*  --  133*  --  132*  --  134*  --  132*   POTASSIUM  --   --   --  4.0  --   --   --  4.0  --  4.2  --  4.0  --  4.3  --  4.1   CHLORIDE  --   --   --  93*  --   --   --  92*  --  94*  --  94*  --  93*  --  94*   CO2  --   --   --  26  --   --   --  24  --  26  --  28  --  28  --  28   BUN  --   --   --  52.9*  --   --   --  54.9*  --  55.7*  --  55.6*  --  57.5*  --  50.8*   CR  --   --   --  7.41*  --   --   --  7.58*  --  7.64*  --  7.56*  --  7.60*  --  7.85*   * 205* 216* 216* 159* 160*   < > 207*   < > 135*   < > 215*   < > 301*   < > 310*   TERENCE  --   --   --  9.5  --   --   --  9.4  --  9.8  --  9.3  --  9.2  --  8.6*    < > = values in this interval not displayed.       INR  Recent Labs   Lab 11/03/24  0657 11/02/24  0648 11/01/24  0713 10/31/24  0711   INR 2.46* 2.48* 2.18* 1.69*      Attestation:   I have reviewed today's relevant vital signs, notes, medications, labs and imaging.    Star Acuna MD  [facility] - Nephrology  Office phone :[phone]  Pager: [phone]

## 2024-11-03 NOTE — PLAN OF CARE
Goal Outcome Evaluation:      Plan of Care Reviewed With: patient    Overall Patient Progress: no changeOverall Patient Progress: no change       Shift: 11/3/24, 6082-0131  POD# 9 L femoral endarterectomy, L femoral to tibial peroneal trunk bypass w/ L great saphenous vein  POD# 7 L great toe amputation    Orientation: A&Ox4  Vital Signs: VSS on RA  Labs: See results  Pain Management: Offered medication but refused; pain higher post ambulation to bathroom and dressing changes.   Bowel: Continent, BM this afternoon  Bladder: Urinal at bedside, patient on peritoneal dialysis.   IV: L PIV - SL, R PIV x2 - SL w/ intermittent zosyn   Wounds/Incisions: L groin dressing CDI, L Leg dressing change this shift x1 CDI, L great toe dressing changed x1 this shift, CDI.   Diet: 2 gm Na diet  Activity Level: Ax1 gb/w  Anticipated Discharge Date/Plan: Pending

## 2024-11-04 LAB
ABO/RH(D): NORMAL
ALBUMIN SERPL BCG-MCNC: 2.1 G/DL (ref 3.5–5.2)
ANION GAP SERPL CALCULATED.3IONS-SCNC: 13 MMOL/L (ref 7–15)
ANTIBODY SCREEN: NEGATIVE
BLD PROD TYP BPU: NORMAL
BLOOD COMPONENT TYPE: NORMAL
BUN SERPL-MCNC: 51.9 MG/DL (ref 8–23)
CALCIUM SERPL-MCNC: 9.6 MG/DL (ref 8.8–10.4)
CHLORIDE SERPL-SCNC: 94 MMOL/L (ref 98–107)
CODING SYSTEM: NORMAL
CREAT SERPL-MCNC: 7.3 MG/DL (ref 0.67–1.17)
CROSSMATCH: NORMAL
EGFRCR SERPLBLD CKD-EPI 2021: 8 ML/MIN/1.73M2
ERYTHROCYTE [DISTWIDTH] IN BLOOD BY AUTOMATED COUNT: 17.8 % (ref 10–15)
GLUCOSE BLDC GLUCOMTR-MCNC: 124 MG/DL (ref 70–99)
GLUCOSE BLDC GLUCOMTR-MCNC: 129 MG/DL (ref 70–99)
GLUCOSE BLDC GLUCOMTR-MCNC: 145 MG/DL (ref 70–99)
GLUCOSE BLDC GLUCOMTR-MCNC: 147 MG/DL (ref 70–99)
GLUCOSE BLDC GLUCOMTR-MCNC: 228 MG/DL (ref 70–99)
GLUCOSE SERPL-MCNC: 136 MG/DL (ref 70–99)
HCO3 SERPL-SCNC: 28 MMOL/L (ref 22–29)
HCT VFR BLD AUTO: 20.7 % (ref 40–53)
HGB BLD-MCNC: 6.9 G/DL (ref 13.3–17.7)
INR PPP: 2.79 (ref 0.85–1.15)
ISSUE DATE AND TIME: NORMAL
MCH RBC QN AUTO: 33.2 PG (ref 26.5–33)
MCHC RBC AUTO-ENTMCNC: 33.3 G/DL (ref 31.5–36.5)
MCV RBC AUTO: 100 FL (ref 78–100)
PHOSPHATE SERPL-MCNC: 5.5 MG/DL (ref 2.5–4.5)
PLATELET # BLD AUTO: 331 10E3/UL (ref 150–450)
POTASSIUM SERPL-SCNC: 4.3 MMOL/L (ref 3.4–5.3)
RBC # BLD AUTO: 2.08 10E6/UL (ref 4.4–5.9)
SODIUM SERPL-SCNC: 135 MMOL/L (ref 135–145)
SPECIMEN EXPIRATION DATE: NORMAL
UNIT ABO/RH: NORMAL
UNIT NUMBER: NORMAL
UNIT STATUS: NORMAL
UNIT TYPE ISBT: 5100
WBC # BLD AUTO: 10.1 10E3/UL (ref 4–11)

## 2024-11-04 PROCEDURE — 82947 ASSAY GLUCOSE BLOOD QUANT: CPT | Performed by: INTERNAL MEDICINE

## 2024-11-04 PROCEDURE — 36415 COLL VENOUS BLD VENIPUNCTURE: CPT | Performed by: INTERNAL MEDICINE

## 2024-11-04 PROCEDURE — 99024 POSTOP FOLLOW-UP VISIT: CPT

## 2024-11-04 PROCEDURE — 86923 COMPATIBILITY TEST ELECTRIC: CPT | Performed by: INTERNAL MEDICINE

## 2024-11-04 PROCEDURE — 90945 DIALYSIS ONE EVALUATION: CPT

## 2024-11-04 PROCEDURE — 250N000013 HC RX MED GY IP 250 OP 250 PS 637: Performed by: INTERNAL MEDICINE

## 2024-11-04 PROCEDURE — P9016 RBC LEUKOCYTES REDUCED: HCPCS | Performed by: INTERNAL MEDICINE

## 2024-11-04 PROCEDURE — 250N000013 HC RX MED GY IP 250 OP 250 PS 637: Performed by: PODIATRIST

## 2024-11-04 PROCEDURE — 99232 SBSQ HOSP IP/OBS MODERATE 35: CPT | Performed by: INTERNAL MEDICINE

## 2024-11-04 PROCEDURE — 250N000011 HC RX IP 250 OP 636

## 2024-11-04 PROCEDURE — 120N000001 HC R&B MED SURG/OB

## 2024-11-04 PROCEDURE — 86900 BLOOD TYPING SEROLOGIC ABO: CPT | Performed by: INTERNAL MEDICINE

## 2024-11-04 PROCEDURE — 86850 RBC ANTIBODY SCREEN: CPT | Performed by: INTERNAL MEDICINE

## 2024-11-04 PROCEDURE — 80069 RENAL FUNCTION PANEL: CPT | Performed by: INTERNAL MEDICINE

## 2024-11-04 PROCEDURE — 85610 PROTHROMBIN TIME: CPT | Performed by: INTERNAL MEDICINE

## 2024-11-04 PROCEDURE — 85018 HEMOGLOBIN: CPT | Performed by: INTERNAL MEDICINE

## 2024-11-04 RX ORDER — WARFARIN SODIUM 2 MG/1
4 TABLET ORAL
Status: COMPLETED | OUTPATIENT
Start: 2024-11-04 | End: 2024-11-04

## 2024-11-04 RX ADMIN — CALCITRIOL CAPSULES 0.25 MCG 0.25 MCG: 0.25 CAPSULE ORAL at 21:39

## 2024-11-04 RX ADMIN — WARFARIN SODIUM 4 MG: 2 TABLET ORAL at 17:22

## 2024-11-04 RX ADMIN — CARVEDILOL 6.25 MG: 6.25 TABLET, FILM COATED ORAL at 21:41

## 2024-11-04 RX ADMIN — PIPERACILLIN AND TAZOBACTAM 2.25 G: 2; .25 INJECTION, POWDER, FOR SOLUTION INTRAVENOUS at 02:17

## 2024-11-04 RX ADMIN — PANTOPRAZOLE SODIUM 40 MG: 40 TABLET, DELAYED RELEASE ORAL at 21:41

## 2024-11-04 RX ADMIN — PIPERACILLIN AND TAZOBACTAM 2.25 G: 2; .25 INJECTION, POWDER, FOR SOLUTION INTRAVENOUS at 17:21

## 2024-11-04 RX ADMIN — SACUBITRIL AND VALSARTAN 1 TABLET: 49; 51 TABLET, FILM COATED ORAL at 21:39

## 2024-11-04 RX ADMIN — SEVELAMER CARBONATE 800 MG: 800 TABLET, FILM COATED ORAL at 17:37

## 2024-11-04 RX ADMIN — INSULIN GLARGINE 35 UNITS: 100 INJECTION, SOLUTION SUBCUTANEOUS at 21:34

## 2024-11-04 RX ADMIN — PANTOPRAZOLE SODIUM 40 MG: 40 TABLET, DELAYED RELEASE ORAL at 08:47

## 2024-11-04 RX ADMIN — TORSEMIDE 100 MG: 100 TABLET ORAL at 08:49

## 2024-11-04 RX ADMIN — CINACALCET 60 MG: 30 TABLET ORAL at 08:48

## 2024-11-04 RX ADMIN — ALLOPURINOL 200 MG: 100 TABLET ORAL at 21:40

## 2024-11-04 RX ADMIN — ATORVASTATIN CALCIUM 40 MG: 40 TABLET, FILM COATED ORAL at 21:40

## 2024-11-04 RX ADMIN — CLOPIDOGREL BISULFATE 75 MG: 75 TABLET ORAL at 08:47

## 2024-11-04 RX ADMIN — SACUBITRIL AND VALSARTAN 1 TABLET: 49; 51 TABLET, FILM COATED ORAL at 08:48

## 2024-11-04 RX ADMIN — PIPERACILLIN AND TAZOBACTAM 2.25 G: 2; .25 INJECTION, POWDER, FOR SOLUTION INTRAVENOUS at 09:00

## 2024-11-04 RX ADMIN — SEVELAMER CARBONATE 800 MG: 800 TABLET, FILM COATED ORAL at 08:49

## 2024-11-04 RX ADMIN — SEVELAMER CARBONATE 800 MG: 800 TABLET, FILM COATED ORAL at 13:27

## 2024-11-04 NOTE — PROGRESS NOTES
[facility]    Internal Medicine Hospitalist Progress Note  11/04/2024  I evaluated patient on the above date.    Ed Bonilla Jr., MD  [phone] (p)  Text Page  Vocera      [New actions/orders today (11/04/2024) are underlined in the assessment and plan. All lab results in the assessment and plan were reviewed.]  Assessment & Plan   Mr. Arnulfo Pritchett is a 64 yo male with history including DM2; HTN; CHF (EF in the 20's); CAD; PAD with prior revascularization; ESRD on PD; gout; HLD; TALI (intolerant to CPAP); and h/o RCC s/p right nephrectomy (2022). He presented to [facility] 10/15/2024 with worsening pain and redness in the left great toe with streaking up the leg. There was concern for limb ischemia and transferred to [facility] 10/16/2024 for management.      # Critical limb ischemia of the left lower extremity.  # S/p left femoral endarterectomy with bovine pericardial patch angioplasty; left distal common femoral artery/proximal superficial femoral artery to tibioperoneal trunk bypass; and temporary closure of left groin wound with wound VAC 10/25/2024.  # S/p ligation of side branches of the left great saphenous vein and temporary closure of right groin with application of wound VAC 10/27/2024.  # Ischemic left great toe wound - s/p amputation of left great toe 10/27/2024.  # Right groin wound infection.  # H/o acute RLE arterial occlusion s/p SFA popliteal balloon angioplasty and stenting (5/2024).  * Initial presentation to OS 10/15 as above. On initial evaluation, pt was afebrile, hemodynamically stable. Labs notable for CBC with WBC normal, hgb 12.5; cr 9.84 (chronic PD); CRP 55.81; INR 4.42. Transferred to [facility] 10/16.  * Started on piperacillin-tazobactam on admit. Vascular surgery and podiatry consulted. Warfarin held on admit (in part for supratherapeutic INR).  * 10/16: MRI left foot  showed constellation of findings favoring gangrene; no other osseous abnormalities to suggest  osteomyelitis; degenerative changes.  * 10/17: IR left lower extremity angiogram 10/17 abandoned due to limited arterial access options.  CTA 10/17 showed significant bilateral PAD including left lower extremity focal calcified severe stenosis of the proximal superficial femoral artery, long segment occlusion of the mid to distal popliteal artery with low attenuation changes which may represent thrombus or soft atherosclerotic plaque, reconstitution of the origins of the posterior tibial arteries and severe tandem stenoses of the distal posterior tibial artery.  * 10/20: Started heparin gtt.  * 10/22: ID consulted. Piperacillin-tazobactam discontinued and changed to amoxicillin-clavulanate.  * 10/25: Underwent left lower extremity bypass as above.  * 10/27: Underwent left great toe amputation as above. Later noted a distinct loss of signals on a.m. rounds; duplex sonography showed flow reversal in the mid=graft with paucity of flow going beyond mid-graft. Taken urgently for surgery as above.  * 10/29: Warfarin restarted. Clopidogrel restarted (had been on ASA this hospitalization). Stopped amoxicillin-clavulanate.  * 11/1: Heparin gtt stopped. Restarted on piperacillin-tazobactam by vascular for possible left groin wound infection.  Recent Labs   Lab 11/04/24  0708 11/03/24  0657 11/02/24  0648 11/01/24  0713 10/31/24  0711 10/30/24  0721   INR 2.79* 2.46* 2.48* 2.18* 1.69* 1.18*   - Post-op management per vascular surgery.  - Continue piperacillin-tazobactam (re-started 11/1) per vascular.  - Continue clopidogrel and atorvastatin.  - Continue warfarin.  - Continue PT and OT - plan TCU.  - Pain management as noted below.  - Appreciate consultant help.      Pain control.  * This hospitalization, noted that patient wanted low-dose pain meds for pain control.  * Ultimately started on low dose PRN opioids (see prior notes).  - Continue PRN oxycodone 2.5-5 mg q4h; PRN IV hydromorphone 0.1-0.2 mg q2h; minimize opioids as  able.  - Continue bowel regimen to reduce risk of constipation as noted.     Ischemic CM (HFrEF).  CAD with h/o stents x3 (last in 2021).   Hypertension (benign essential).  HLD.  [PTA BP meds: carvedilol 12.5 mg at bedtime; sacubitril-valsartan 49-51 mg BID; torsemide 100 mg qam.]  * Echo 6/2024 showed LVEF 20-25%, segmental wall motion globally hypokinetic.   * Seen by cardiology this hospitalization.  * Nuc stress test 10/22 was abnormal but no evidence of ischemia; medium sized area of infarction in the entire inferolateral segment(s) of the left ventricle extending into basal inferior segment; small area of nontransmural infarction in the apical segment(s) of the left ventricle; TID absent; left ventricular ejection fraction at stress 26%.  - Continue atorvastatin and clopidogrel.  - Continue carvedilol, sacubitril-valsartan and torsemide.  - Continue to monitor daily wts.  - Additional volume management via PD as noted.    Chronic paroxysmal afib s/p atrial ablation (6/2023).  - Continue carvedilol.  - Continue anticoagulation as above.    Acute blood loss anemia on chronic anemia.  * Hgb 10.9 on admit and with gradual drop in hgb in setting of above surgeries.  Recent Labs   Lab 11/04/24  1048 11/02/24  0648 11/01/24  0713 10/31/24  0711 10/30/24  0721 10/29/24  0914   HGB 6.9* 7.4* 7.3* 8.5* 7.1* 5.9*   - Will order 1U prbc's.  - Continue to monitor CBC - repeat in am.  - Consider prbc transfusion if hgb </= 7.0 or if significant bleeding with hemodynamic instability or if symptomatic.    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  Secondary hyperparathyroidism  Hyperkalemia, resolved.  * PTA on nightly PD of which he has been tolerating. Access via RLQ double cuffed coiled PD catheter placed 4/16/2021.   * Nephrology consulted for PD.  * Treated with sodium zirconium cyclosilicate for hyperkalemia this hospitalization.  * K normalized 10/17.  - Continue nightly PD per nephrology.  - Continue calcitriol, cinacalcet and  sevelamer carbonate.  - Continue every other day gentamicin cream to PD cath site.  - Appreciate nephrology help - I d/w Dr. Guzman 11/4.     Hyperenhancing left lower pole renal nodule on CT.  Hx of RCC s/p R nephrectomy (5/2022).  *  CTA 10/17 also showed a 4 mm hyperenhancing nodule in lower pole of the left kidney, noted  that this may represent a small primary renal cell carcinoma.  * Pt made aware of the above finding.  - Pt is aware of this finding and will follow-up closely with his urologist after discharge.     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  [PTA: glargine 39U at bedtime.]  * Hgba1c 5.7 10/16/2024.  * Pt declined diabetic diet this hospitalization.  Recent Labs   Lab 11/04/24  0854 11/04/24  0221 11/03/24  2143 11/03/24  1707 11/03/24  1223 11/03/24  1015   * 228* 177* 189* 209* 205*     Recent Labs   Lab Test 10/16/24  0642 07/09/24  0914   A1C 5.7* 5.9*   - Continue carb controlled diet (pt refuses diabetic diet).  - Continue glargine 35U at bedtime.  - Continue aspart ISS (high).     Constipation.  * Noted that pt struggles with constipation at times.  - Continue scheduled polyethylene glycol and senna-docusate PRN senna-docusate; PRN bisacodyl suppository.    Gout.  - Continue allopurinol.    GERD.  - Continue PPI.    TALI.  - Noted intolerant to CPAP.  - Continue to monitor clinically.    Clinically Significant Risk Factors         # Hyponatremia: Lowest Na = 134 mmol/L in last 2 days, will monitor as appropriate  # Hypochloremia: Lowest Cl = 93 mmol/L in last 2 days, will monitor as appropriate   # Hypercalcemia: corrected calcium is >10.1, will monitor as appropriate    # Hypoalbuminemia: Lowest albumin = 2 g/dL at 11/3/2024  6:57 AM, will monitor as appropriate     # Hypertension: Noted on problem list                 # Financial/Environmental Concerns: none         Diet: Combination Diet Renal Diet (dialysis); 2 gm NA Diet    Prophylaxis: PCD's, ambulation, and is on  warfarin.  Rosario Catheter: Not present  Lines: None     Code Status: Full Code    Disposition Plan   Medically Ready for Discharge: Anticipated in 2-4 Days  Expected discharge to transitional care unit (TCU) pending stability/improvement/resolution of above acute issues, consultant evaluation/recommendations/clearance, and TCU/rehab/facility bed availability.    Entered: Ed Bonilla MD 11/04/2024, 11:12 AM       Interval History   Did not sleep well overnight.  Pain better today.    * Data reviewed today: I reviewed all new labs and imaging over the last 24 hours. I personally reviewed no images or ECG's today.    Physical Exam   Most recent vitals:   , Blood pressure 112/76, pulse 98, temperature 97.8  F (36.6  C), temperature source Oral, resp. rate 18, weight 79.9 kg (176 lb 2.4 oz), SpO2 98%. O2 Device: None (Room air)    Vitals:    11/03/24 0500 11/03/24 2155 11/04/24 0629   Weight: 79.1 kg (174 lb 6.1 oz) 78.6 kg (173 lb 4.5 oz) 79.9 kg (176 lb 2.4 oz)     Vital signs with ranges:  Temp:  [97.8  F (36.6  C)-98.6  F (37  C)] 97.8  F (36.6  C)  Pulse:  [] 98  Resp:  [18] 18  BP: (108-128)/(75-82) 112/76  SpO2:  [98 %] 98 %  Patient Vitals for the past 24 hrs:   BP Temp Temp src Pulse Resp SpO2 Weight   11/04/24 0729 112/76 97.8  F (36.6  C) Oral 98 18 98 % --   11/04/24 0629 -- -- -- -- -- -- 79.9 kg (176 lb 2.4 oz)   11/04/24 0554 108/75 98.3  F (36.8  C) Oral 98 18 98 % --   11/03/24 2155 128/82 98.6  F (37  C) Oral 101 18 98 % 78.6 kg (173 lb 4.5 oz)   11/03/24 1517 125/82 98.2  F (36.8  C) Oral 101 -- 98 % --     I/O's last 24 hours:  I/O last 3 completed shifts:  In: 940 [P.O.:940]  Out: 400 [Urine:400]    Constitutional: awake, alert, oriented, pleasant, fatigued appearing   Head:   Eyes:   ENT:   Neck:   Cardiovascular: regular rate and rhythm; no murmurs, rubs or gallops  Lungs: diminished in the bases, no crackles or wheezes  Gastrointestinal/Abdomen: soft, non-tender, non-distended,  positive bowel sounds  :   Musculoskeletal:   Skin/Extremities:   Neurologic:   Psychiatric:   Hematologic/Lymphatic/Immunologic:        Labs reviewed.  Recent Labs   Lab 11/04/24  0854 11/04/24  0708 11/04/24 0221 11/03/24 2143 11/03/24 0813 11/03/24  0657 11/02/24  0939 11/02/24  0648 11/01/24  0913 11/01/24  0713 10/31/24  0856 10/31/24  0711   WBC  --   --   --   --   --   --   --  11.0  --  14.1*  --  13.1*   HGB  --   --   --   --   --   --   --  7.4*  --  7.3*  --  8.5*   MCV  --   --   --   --   --   --   --  100  --  95  --  94   PLT  --   --   --   --   --  330  --  340  --  362  --  317   INR  --  2.79*  --   --   --  2.46*  --  2.48*  --  2.18*  --  1.69*   NA  --   --   --   --   --  134*  --  132*  --  133*  --  132*   POTASSIUM  --   --   --   --   --  4.0  --  4.0  --  4.2  --  4.0   CHLORIDE  --   --   --   --   --  93*  --  92*  --  94*  --  94*   CO2  --   --   --   --   --  26  --  24  --  26  --  28   BUN  --   --   --   --   --  52.9*  --  54.9*  --  55.7*  --  55.6*   CR  --   --   --   --   --  7.41*  --  7.58*  --  7.64*  --  7.56*   ANIONGAP  --   --   --   --   --  15  --  16*  --  13  --  10   TERENCE  --   --   --   --   --  9.5  --  9.4  --  9.8  --  9.3   *  --  228* 177*   < > 216*   < > 207*   < > 135*   < > 215*   ALBUMIN  --   --   --   --   --  2.0*  --  2.2*  --  2.3*  --  2.2*    < > = values in this interval not displayed.     No lab results found.  Recent Labs   Lab 11/04/24  0854 11/04/24  0221 11/03/24  2143 11/03/24  1707 11/03/24  1223 11/03/24  1015   * 228* 177* 189* 209* 205*     Recent Labs   Lab Test 10/16/24  0642 07/09/24  0914   A1C 5.7* 5.9*       Recent Labs   Lab 11/04/24  0708 11/03/24  0657 11/02/24  0648 11/01/24  0713 10/31/24  0711 10/30/24  0721   INR 2.79* 2.46* 2.48* 2.18* 1.69* 1.18*     Recent Labs   Lab 11/02/24  0648 11/01/24  0713 10/31/24  0711 10/30/24  0721 10/29/24  0914   WBC 11.0 14.1* 13.1* 10.8 10.8       MICRO:  Cultures  (including blood and urine):  No lab results found in last 7 days.    No results found for this or any previous visit (from the past 24 hours).    Medications   All medications were reviewed. MAR.    Infusions:  Current Facility-Administered Medications   Medication Dose Route Frequency Provider Last Rate Last Admin    dianeal PD LOW calcium-2.5% dex (calcium 2.5 mEq/L) 6,000 mL PERITONEAL DIALYSATE   Dialysis [facility] Supplied PD Star Acuna MD        Patient is already receiving anticoagulation with heparin, enoxaparin (LOVENOX), warfarin (COUMADIN)  or other anticoagulant medication   Does not apply Continuous PRN Haley Joseph DPM, Podiatry/Foot and Ankle Surgery         Scheduled Medications:  Current Facility-Administered Medications   Medication Dose Route Frequency Provider Last Rate Last Admin    allopurinol (ZYLOPRIM) tablet 200 mg  200 mg Oral QPM Haley Joseph DPM, Podiatry/Foot and Ankle Surgery   200 mg at 11/03/24 2129    atorvastatin (LIPITOR) tablet 40 mg  40 mg Oral At Bedtime Haley Joseph DPM, Podiatry/Foot and Ankle Surgery   40 mg at 11/03/24 2140    calcitRIOL (ROCALTROL) capsule 0.25 mcg  0.25 mcg Oral At Bedtime Haley Joseph DPM, Podiatry/Foot and Ankle Surgery   0.25 mcg at 11/03/24 2129    carvedilol (COREG) tablet 6.25 mg  6.25 mg Oral At Bedtime Haley Joseph DPM, Podiatry/Foot and Ankle Surgery   6.25 mg at 11/03/24 2130    cinacalcet (SENSIPAR) tablet 60 mg  60 mg Oral Once per day on Monday Wednesday Friday Haley Joseph DPM, Podiatry/Foot and Ankle Surgery   60 mg at 11/04/24 0848    clopidogrel (PLAVIX) tablet 75 mg  75 mg Oral Daily Chai España MD   75 mg at 11/04/24 0847    insulin aspart (NovoLOG) injection (RAPID ACTING)  1-10 Units Subcutaneous TID AC Chai España MD   2 Units at 11/03/24 1729    insulin aspart (NovoLOG) injection (RAPID ACTING)  1-7 Units Subcutaneous At Bedtime Chai España MD   2 Units at 10/30/24 2135    insulin  glargine (LANTUS PEN) injection 35 Units  35 Units Subcutaneous At Bedtime Ed Bonilla MD   35 Units at 11/03/24 2144    pantoprazole (PROTONIX) EC tablet 40 mg  40 mg Oral BID Haley Joseph DPM, Podiatry/Foot and Ankle Surgery   40 mg at 11/04/24 0847    piperacillin-tazobactam (ZOSYN) 2.25 g vial to attach to  ml bag  2.25 g Intravenous Q8H Sherron Gamble  mL/hr at 11/04/24 0217 2.25 g at 11/04/24 0900    polyethylene glycol (MIRALAX) Packet 17 g  17 g Oral Daily Haley Joseph DPM, Podiatry/Foot and Ankle Surgery   17 g at 10/31/24 0843    sacubitril-valsartan (ENTRESTO) 49-51 MG per tablet 1 tablet  1 tablet Oral BID Haley Joseph DPM, Podiatry/Foot and Ankle Surgery   1 tablet at 11/04/24 0848    senna-docusate (SENOKOT-S/PERICOLACE) 8.6-50 MG per tablet 2 tablet  2 tablet Oral BID Sherron Gamble MD   2 tablet at 10/30/24 2131    sevelamer carbonate (RENVELA) tablet 800 mg  800 mg Oral TID w/meals Haley Joseph DPM, Podiatry/Foot and Ankle Surgery   800 mg at 11/04/24 0849    sodium chloride (PF) 0.9% PF flush 3 mL  3 mL Intracatheter Q8H Haley Joseph DPM, Podiatry/Foot and Ankle Surgery   3 mL at 11/04/24 0853    torsemide (DEMADEX) tablet 100 mg  100 mg Oral Daily Vega Guzman MD   100 mg at 11/04/24 0849    Warfarin Dose Required Daily - Pharmacist Managed  1 each Oral See Admin Instructions Chai España MD         PRN Medications:  Current Facility-Administered Medications   Medication Dose Route Frequency Provider Last Rate Last Admin    acetaminophen (TYLENOL) tablet 650 mg  650 mg Oral Q4H PRN Haley Joseph DPM, Podiatry/Foot and Ankle Surgery   650 mg at 10/31/24 2112    bisacodyl (DULCOLAX) suppository 10 mg  10 mg Rectal Daily PRN Haley Joseph DPM, Podiatry/Foot and Ankle Surgery        calcium carbonate (TUMS) chewable tablet 1,000 mg  1,000 mg Oral 4x Daily PRN Haley Joseph DPM, Podiatry/Foot and Ankle Surgery        glucose gel 15-30 g   15-30 g Oral Q15 Min PRN Haley Joseph DPM, Podiatry/Foot and Ankle Surgery        Or    dextrose 50 % injection 25-50 mL  25-50 mL Intravenous Q15 Min PRN Haley Joseph DPM, Podiatry/Foot and Ankle Surgery        Or    glucagon injection 1 mg  1 mg Subcutaneous Q15 Min PRN Haley Joseph DPM, Podiatry/Foot and Ankle Surgery        dianeal PD LOW calcium-2.5% dex (calcium 2.5 mEq/L) 6,000 mL PERITONEAL DIALYSATE   Dialysis [facility] Supplied PD Star Acuna MD        gentamicin (GARAMYCIN) 0.1 % cream   Topical Daily PRN Star Acuna MD        HYDROmorphone (DILAUDID) injection 0.1 mg  0.1 mg Intravenous Q2H PRN Haley Joseph DPM, Podiatry/Foot and Ankle Surgery        Or    HYDROmorphone (DILAUDID) injection 0.2 mg  0.2 mg Intravenous Q2H PRN Haley Joseph DPM, Podiatry/Foot and Ankle Surgery        lidocaine (LMX4) cream   Topical Q1H PRN Haley Jospeh DPM, Podiatry/Foot and Ankle Surgery        lidocaine 1 % 0.1-1 mL  0.1-1 mL Other Q1H PRN Haley Joseph DPM, Podiatry/Foot and Ankle Surgery        magnesium hydroxide (MILK OF MAGNESIA) suspension 30 mL  30 mL Oral Daily PRN Haley Joseph DPM, Podiatry/Foot and Ankle Surgery        naloxone (NARCAN) injection 0.2 mg  0.2 mg Intravenous Q2 Min PRN Haley Joseph DPM, Podiatry/Foot and Ankle Surgery        Or    naloxone (NARCAN) injection 0.4 mg  0.4 mg Intravenous Q2 Min PRN Haley Joseph DPM, Podiatry/Foot and Ankle Surgery        Or    naloxone (NARCAN) injection 0.2 mg  0.2 mg Intramuscular Q2 Min PRN Haley Joseph DPM, Podiatry/Foot and Ankle Surgery        Or    naloxone (NARCAN) injection 0.4 mg  0.4 mg Intramuscular Q2 Min PRN Haley Joseph DPM, Podiatry/Foot and Ankle Surgery        ondansetron (ZOFRAN ODT) ODT tab 4 mg  4 mg Oral Q6H PRN Haley Joseph DPM, Podiatry/Foot and Ankle Surgery        Or    ondansetron (ZOFRAN) injection 4 mg  4 mg Intravenous Q6H PRN Haley Joseph DPM, Podiatry/Foot and Ankle Surgery   4 mg at  11/01/24 0919    oxyCODONE IR (ROXICODONE) half-tab 2.5 mg  2.5 mg Oral Q4H PRHaley Mcclure DPM, Podiatry/Foot and Ankle Surgery        Or    oxyCODONE (ROXICODONE) tablet 5 mg  5 mg Oral Q4H PRN Haley Joseph DPM, Podiatry/Foot and Ankle Surgery   5 mg at 10/31/24 2307    Patient is already receiving anticoagulation with heparin, enoxaparin (LOVENOX), warfarin (COUMADIN)  or other anticoagulant medication   Does not apply Continuous PRN Haley Joseph DPM, Podiatry/Foot and Ankle Surgery        prochlorperazine (COMPAZINE) injection 5 mg  5 mg Intravenous Q6H PRHaley Mcclure DPM, Podiatry/Foot and Ankle Surgery        Or    prochlorperazine (COMPAZINE) tablet 5 mg  5 mg Oral Q6H PRHaley Mcclure DPM, Podiatry/Foot and Ankle Surgery        senna-docusate (SENOKOT-S/PERICOLACE) 8.6-50 MG per tablet 1 tablet  1 tablet Oral BID PRHaley Mcclure DPM, Podiatry/Foot and Ankle Surgery   1 tablet at 10/27/24 1116    Or    senna-docusate (SENOKOT-S/PERICOLACE) 8.6-50 MG per tablet 2 tablet  2 tablet Oral BID PRN Haley Joseph DPM, Podiatry/Foot and Ankle Surgery        sodium chloride (PF) 0.9% PF flush 3 mL  3 mL Intracatheter q1 min prHaley Mcclure DPM, Podiatry/Foot and Ankle Surgery   3 mL at 11/04/24 0967

## 2024-11-04 NOTE — PROGRESS NOTES
Care Management Follow Up    Length of Stay (days): 20    Expected Discharge Date: 2024     Concerns to be Addressed: discharge planning  wants PCP f/up scheduled and need to check if [facility] can change to smaller PD bags  Patient plan of care discussed at interdisciplinary rounds: Yes    Anticipated Discharge Disposition: Skilled Nursing Facility              Anticipated Discharge Services: Other (see comment) (Patient is planning to hire a private duty PCA)  Anticipated Discharge DME: None    Patient/family educated on Medicare website which has current facility and service quality ratings:    Education Provided on the Discharge Plan: Other (see comments) (ongoing)  Patient/Family in Agreement with the Plan: yes    Referrals Placed by CM/SW: Internal Clinic Care Coordination, Specialty Providers, Scheduled Follow-up appointments  Private pay costs discussed: Not applicable    Discussed  Partnership in Safe Discharge Planning  document with patient/family: Yes:     Handoff Completed: No, handoff not indicated or clinically appropriate    Additional Information:  Patient accepted at [facility] who uses [facility] and is able to accommodate the peritoneal dialysis.     [facility] would need patient to switch to [facility] and due to the nationwide shortage of IV fluids they are not accepting new patients at this time.         Coordination complete.    Service Provider Request Status Services Address Phone Fax Patient Preferred   [facility] ([facility])  Selected Skilled Nursing [address] [facility] [address] [phone] --   Current Capacity last updated by Tuan Cook RN on 8/3/2024 12:38 PM    TCU Address on different side of [facility]: [address] [facility] [address]            Internal Comment last updated by Senia Pineda 10/31/2024 11:33 AM    10/31/24 1128: I called admissions and they will review. SA            [facility] ([facility])  Pending - Request Sent -- [address], [place] [address] [phone] --   [facility] (SNF) Declined  Cannot meet patient's needs -- [address] , [address] [address] [phone] --   [facility] ([facility]) Declined  Cannot meet patient's needs -- [address] [facility] [address] [phone]          Next Steps:   If medically ready to discharge, need PAS, Transportation and orders      KATELYN Duarte

## 2024-11-04 NOTE — PLAN OF CARE
Date & Time: 11/4/2024   Surgery/POD#: POD10 s/p fem bypass   Behavior & Aggression: green  Fall Risk: yes  Orientation:A&O4  ABNL VS/O2:none  ABNL Labs: hgb 6.9, transfused one unit PRBC, Cr+>7  Pain Management:denies  Bowel/Bladder: continent, no BM today  Drains: none  Wounds/incisions L groin incision changed by vascular today. CDI  Diet:Renal  Activity Level: up 1 GBW  Tests/Procedures: none  Anticipated  DC Date: pending  Significant Information: none

## 2024-11-04 NOTE — PLAN OF CARE
Date & Time: 11/4/2023 0917-7130  Surgery/POD#: POD 10 Left Femoral Endartectomy, Left Femoral to Tibial peroneal Trunk Bypass w/ Left great sephaneous vein. Pod 7 Left great toe amputation    Behavior & Aggression: Green  Fall Risk: Yes  Orientation:A&Ox4  ABNL VS/O2:VSS RA (Tachy 101), CMS intact  ABNL Labs: See results  Pain Management: Declines pain meds PRNs available  Bowel/Bladder: Continent of B&B, Urinal at bedside  Drains: 3 PIVs. Int antibiotics, Peritoneal dialysis site  Wounds/incisionsLLE incision, L Groin incision, Left toe dressing  Diet:2 G na/Renal  Activity Level: A1 GB&W  Tests/Procedures: n/a  Anticipated  DC Date: Pending to TCU     no

## 2024-11-04 NOTE — PLAN OF CARE
Goal Outcome Evaluation:    Date & Time: 11/3/24, 3067-1510  Surgery/POD#: POD9 s/p fem-TP trunk bypass with takeback to OR for ligation of venous sidebranches  Behavior & Aggression: Green  Fall Risk: Yes   Orientation:A&Ox4   ABNL VS/O2: VSS on RA exc tachy at times  Pain Management: C/o 2/10 pain, declined intervention  Bowel/Bladder: Continent, using urinal. Peritoneal dialysis patient.   IV/Drains: PIV SL x3, int abx. RN to assist connecting patient to PD machine.    Wounds/incisions: Patient refused skin assessment  Diet: 2G sodium diet.   Activity Level: Ax1, not oob this shift.    Anticipated  DC Date: Pending   Significant Information: Pulses + w/ doppler.

## 2024-11-04 NOTE — PROGRESS NOTES
VASCULAR SURGERY PROGRESS NOTE    Subjective:  Feeling well this am, no fevers or chills.     Objective:  Intake/Output Summary (Last 24 hours) at 11/4/2024 0755  Last data filed at 11/3/2024 2155  Gross per 24 hour   Intake 940 ml   Output 400 ml   Net 540 ml     PHYSICAL EXAM:  /76 (BP Location: Right arm)   Pulse 98   Temp 97.8  F (36.6  C) (Oral)   Resp 18   Wt 79.9 kg (176 lb 2.4 oz)   SpO2 98%   BMI 24.12 kg/m    Alert and oriented x4, neurologically intact  NLB on RA, soft abdomen, nondistended, nontender  Biphasic DP And PT signals  Lower extremity incisions intact  Left groin incision continues to improve, reapproximated with small openings, less erythema      ASSESSMENT:  64 year old male POD10 s/p fem-TP trunk bypass with takeback to OR for ligation of venous sidebranches. Doing well post-operatively. Groin incision healing.    PLAN:  - Dressing change daily for groin with surgery team  - Continue zosyn  - Continue warfarin, plavix  - Encourage gentle ambulation    Discussed pt history, exam, assessment and plan with Vascular Surgery staff    Cecilia Stearns, NP  VASCULAR SURGERY     STAFF: Continues to improve.  Good flow in graft.   Groin incision is slowly improving--on Zosyn and dressing changes   with Aquacel Ag     Nightly peritoneal dialysis working well.       Patrick Hein MD

## 2024-11-04 NOTE — PROGRESS NOTES
[facility]     Renal Progress Note       SHORTHAND KEY FOR MY NOTES:  c = with, s = without, p = after, a = before, x = except, asx = asymptomatic, tx = transplant or treatment, sx = symptoms or symptomatic, cx = canceled or culture, rxn = reaction, yday = yesterday, nl = normal, abx = antibiotics, fxn = function, dx = diagnosis, dz = disease, m/h = melena/hematochezia, c/d/l/ha = cramping/dizziness/lightheadedness/headache, d/c = discharge or diarrhea/constipation, f/c/n/v = fevers/chills/nausea/vomiting, cp/sob = chest pain/shortness of breath, tbv = total body volume, rxn = reaction, tdc = tunneled dialysis catheter, pta = prior to admission, hd = hemodialysis, pd = peritoneal dialysis, hhd = home hemodialysis, edw = estimated dry wt         Assessment/Plan:     1.  ESKD.  Pt's sugars are better controlled and he is UF'ing better.  Chemistries are better.    A.  Continue same 2.5% regimen.  B.  Will need to figure out where he can dialyse at a NH.  [facility] is the best TCU for him to go to bc he's on PD so we will see if [facility] can see pts there.  [facility] does not have staff properly trained in PD so would not recommend going there.  C.  He is a [facility] pt so they may have a facility up there where he can go to rehab.  Left msg for the [facility] nephrologist.    2.  Severe LLE PAD.  Pt is s/p angio.  His pain is decently controlled.  A.  Pain meds prn.  B.  Further plans per Vasc Surg.    3.  Anemia.  Pt's hb is down to 6.9 today and he's getting blood.  A.  Agree c transfusion.  B.  Darbo 150 q 2 wks.    4.  HTN.  Pt's BP is controlled c current regimen.  A.  Continue same meds/doses for now.  B.  If the BP drops, decrease carvedilol first.    5.  DM2.  Pt's sugars are finally better controlled c the current regimen.  A.  Continue glargine 35p.  B.  Continue high ISS.    6.  FEN.  Electrolytes are ok.    Case d/w Dr. Bonilla.        Interval History:     Pt does not have  any major complaints. He is breathing ok.          Medications and Allergies:     Current Facility-Administered Medications   Medication Dose Route Frequency Provider Last Rate Last Admin    allopurinol (ZYLOPRIM) tablet 200 mg  200 mg Oral QPM Haley Joseph DPM, Podiatry/Foot and Ankle Surgery   200 mg at 11/03/24 2129    atorvastatin (LIPITOR) tablet 40 mg  40 mg Oral At Bedtime Haley Joseph DPM, Podiatry/Foot and Ankle Surgery   40 mg at 11/03/24 2140    calcitRIOL (ROCALTROL) capsule 0.25 mcg  0.25 mcg Oral At Bedtime Haley Joseph DPM, Podiatry/Foot and Ankle Surgery   0.25 mcg at 11/03/24 2129    carvedilol (COREG) tablet 6.25 mg  6.25 mg Oral At Bedtime Haley Joseph DPM, Podiatry/Foot and Ankle Surgery   6.25 mg at 11/03/24 2130    cinacalcet (SENSIPAR) tablet 60 mg  60 mg Oral Once per day on Monday Wednesday Friday Haley Joseph DPM, Podiatry/Foot and Ankle Surgery   60 mg at 11/04/24 0848    clopidogrel (PLAVIX) tablet 75 mg  75 mg Oral Daily Chai España MD   75 mg at 11/04/24 0847    insulin aspart (NovoLOG) injection (RAPID ACTING)  1-10 Units Subcutaneous TID AC Chai España MD   1 Units at 11/04/24 1327    insulin aspart (NovoLOG) injection (RAPID ACTING)  1-7 Units Subcutaneous At Bedtime Chai España MD   2 Units at 10/30/24 2133    insulin glargine (LANTUS PEN) injection 35 Units  35 Units Subcutaneous At Bedtime Ed Bonilla MD   35 Units at 11/03/24 2144    pantoprazole (PROTONIX) EC tablet 40 mg  40 mg Oral BID Haley Joseph DPM, Podiatry/Foot and Ankle Surgery   40 mg at 11/04/24 0847    piperacillin-tazobactam (ZOSYN) 2.25 g vial to attach to  ml bag  2.25 g Intravenous Q8H Sherron Gamble  mL/hr at 11/04/24 0217 2.25 g at 11/04/24 0900    polyethylene glycol (MIRALAX) Packet 17 g  17 g Oral Daily Haley Joseph, ANGELITA, Podiatry/Foot and Ankle Surgery   17 g at 10/31/24 0843    sacubitril-valsartan (ENTRESTO) 49-51 MG per tablet 1  tablet  1 tablet Oral BID Haley Joseph DPM, Podiatry/Foot and Ankle Surgery   1 tablet at 11/04/24 0848    senna-docusate (SENOKOT-S/PERICOLACE) 8.6-50 MG per tablet 2 tablet  2 tablet Oral BID Sherron Gamble MD   2 tablet at 10/30/24 2131    sevelamer carbonate (RENVELA) tablet 800 mg  800 mg Oral TID w/meals Haley Joseph DPM, Podiatry/Foot and Ankle Surgery   800 mg at 11/04/24 1327    sodium chloride (PF) 0.9% PF flush 3 mL  3 mL Intracatheter Q8H Haley Joseph DPM, Podiatry/Foot and Ankle Surgery   3 mL at 11/04/24 0853    torsemide (DEMADEX) tablet 100 mg  100 mg Oral Daily Vega Guzman MD   100 mg at 11/04/24 0849    warfarin ANTICOAGULANT (COUMADIN) tablet 4 mg  4 mg Oral ONCE at 18:00 Chai España MD        Warfarin Dose Required Daily - Pharmacist Managed  1 each Oral See Admin Instructions Chai España MD         No Known Allergies       Physical Exam:     Vitals were reviewed     , Blood pressure 118/75, pulse 95, temperature 98.2  F (36.8  C), temperature source Oral, resp. rate 18, weight 79.9 kg (176 lb 2.4 oz), SpO2 99%.  Wt Readings from Last 3 Encounters:   11/04/24 79.9 kg (176 lb 2.4 oz)   10/15/24 78.6 kg (173 lb 4.5 oz)   10/08/24 78.7 kg (173 lb 8 oz)     Intake/Output Summary (Last 24 hours) at 11/4/2024 1551  Last data filed at 11/4/2024 1543  Gross per 24 hour   Intake 810 ml   Output 1000 ml   Net -190 ml     GENERAL APPEARANCE: pleasant, NAD, interactive, facial edema  RESP: CTA B c good efforts  CV: RRR c 2/6 m, nl S1/S2   ABDOMEN: s/nt/nd, + PD catheter  EXTREMITIES/SKIN: + edema; LLE dressed         Data:     CBC RESULTS:     Recent Labs   Lab 11/04/24  1048 11/03/24  0657 11/02/24  0648 11/01/24  0713 10/31/24  0711 10/30/24  0721 10/29/24  0914   WBC 10.1  --  11.0 14.1* 13.1* 10.8 10.8   RBC 2.08*  --  2.26* 2.22* 2.57* 2.11* 1.67*   HGB 6.9*  --  7.4* 7.3* 8.5* 7.1* 5.9*   HCT 20.7*  --  22.5* 21.1* 24.2* 20.6* 17.2*    330 340 362 317 311 310      Basic Metabolic Panel:  Recent Labs   Lab 11/04/24  1303 11/04/24  1048 11/04/24  0854 11/04/24  0221 11/03/24  2143 11/03/24  1707 11/03/24  0813 11/03/24  0657 11/02/24  0939 11/02/24  0648 11/01/24  0913 11/01/24  0713 10/31/24  0856 10/31/24  0711 10/30/24  0826 10/30/24  0721   NA  --  135  --   --   --   --   --  134*  --  132*  --  133*  --  132*  --  134*   POTASSIUM  --  4.3  --   --   --   --   --  4.0  --  4.0  --  4.2  --  4.0  --  4.3   CHLORIDE  --  94*  --   --   --   --   --  93*  --  92*  --  94*  --  94*  --  93*   CO2  --  28  --   --   --   --   --  26  --  24  --  26  --  28  --  28   BUN  --  51.9*  --   --   --   --   --  52.9*  --  54.9*  --  55.7*  --  55.6*  --  57.5*   CR  --  7.30*  --   --   --   --   --  7.41*  --  7.58*  --  7.64*  --  7.56*  --  7.60*   * 136* 129* 228* 177* 189*   < > 216*   < > 207*   < > 135*   < > 215*   < > 301*   TERENCE  --  9.6  --   --   --   --   --  9.5  --  9.4  --  9.8  --  9.3  --  9.2    < > = values in this interval not displayed.     INR  Recent Labs   Lab 11/04/24  0708 11/03/24  0657 11/02/24  0648 11/01/24  0713   INR 2.79* 2.46* 2.48* 2.18*      Attestation:   I have reviewed today's relevant vital signs, notes, medications, labs and imaging.    Vega Guzman MD  [facility] - Nephrology  [phone]

## 2024-11-04 NOTE — PROGRESS NOTES
Cycler set up per protocol and per treatment orders      Results from previous treatment:  Effluent color and clarity: yellow, clear  Total UF: 290ml  Initial Drain: 232ml  Avg Dwell: 1:22  Lost Dwell: 0min     Cycler Serial Number: [id number]  Total Treatment Volume: 10,000mL  Total treatment time: 9 hrs  Fill Volume: 2000ml  Last Fill Volume:0ml  Heater ba.5% 6000mL;  Lot #: [id number];  Expiration Date: 2026  Side Bag #1: 2.5% 6000mL;  Lot #: [id number];  Expiration Date: 2026  Dwell Time: 1:22 minutes  Initial Drain Alarm: 0ml  PD orders reviewed with Patient procedure and ESRD teaching done and questions answered.  Cycler ready for hook-up.    Report given to: MARYSOL Armstrong RN

## 2024-11-05 ENCOUNTER — APPOINTMENT (OUTPATIENT)
Dept: PHYSICAL THERAPY | Facility: CLINIC | Age: 64
DRG: 252 | End: 2024-11-05
Attending: HOSPITALIST
Payer: MEDICARE

## 2024-11-05 LAB
ALBUMIN SERPL BCG-MCNC: 2 G/DL (ref 3.5–5.2)
ANION GAP SERPL CALCULATED.3IONS-SCNC: 13 MMOL/L (ref 7–15)
BUN SERPL-MCNC: 56.4 MG/DL (ref 8–23)
CALCIUM SERPL-MCNC: 8.9 MG/DL (ref 8.8–10.4)
CHLORIDE SERPL-SCNC: 94 MMOL/L (ref 98–107)
CREAT SERPL-MCNC: 7.85 MG/DL (ref 0.67–1.17)
EGFRCR SERPLBLD CKD-EPI 2021: 7 ML/MIN/1.73M2
ERYTHROCYTE [DISTWIDTH] IN BLOOD BY AUTOMATED COUNT: 18.7 % (ref 10–15)
GLUCOSE BLDC GLUCOMTR-MCNC: 101 MG/DL (ref 70–99)
GLUCOSE BLDC GLUCOMTR-MCNC: 106 MG/DL (ref 70–99)
GLUCOSE BLDC GLUCOMTR-MCNC: 210 MG/DL (ref 70–99)
GLUCOSE BLDC GLUCOMTR-MCNC: 45 MG/DL (ref 70–99)
GLUCOSE BLDC GLUCOMTR-MCNC: 68 MG/DL (ref 70–99)
GLUCOSE BLDC GLUCOMTR-MCNC: 70 MG/DL (ref 70–99)
GLUCOSE BLDC GLUCOMTR-MCNC: 76 MG/DL (ref 70–99)
GLUCOSE SERPL-MCNC: 111 MG/DL (ref 70–99)
HCO3 SERPL-SCNC: 25 MMOL/L (ref 22–29)
HCT VFR BLD AUTO: 22.9 % (ref 40–53)
HGB BLD-MCNC: 7.7 G/DL (ref 13.3–17.7)
INR PPP: 3.2 (ref 0.85–1.15)
MCH RBC QN AUTO: 33 PG (ref 26.5–33)
MCHC RBC AUTO-ENTMCNC: 33.6 G/DL (ref 31.5–36.5)
MCV RBC AUTO: 98 FL (ref 78–100)
PHOSPHATE SERPL-MCNC: 5.6 MG/DL (ref 2.5–4.5)
PLATELET # BLD AUTO: 330 10E3/UL (ref 150–450)
POTASSIUM SERPL-SCNC: 4.8 MMOL/L (ref 3.4–5.3)
RBC # BLD AUTO: 2.33 10E6/UL (ref 4.4–5.9)
SODIUM SERPL-SCNC: 132 MMOL/L (ref 135–145)
WBC # BLD AUTO: 9.1 10E3/UL (ref 4–11)

## 2024-11-05 PROCEDURE — 85014 HEMATOCRIT: CPT | Performed by: INTERNAL MEDICINE

## 2024-11-05 PROCEDURE — 99232 SBSQ HOSP IP/OBS MODERATE 35: CPT | Performed by: INTERNAL MEDICINE

## 2024-11-05 PROCEDURE — 250N000013 HC RX MED GY IP 250 OP 250 PS 637: Performed by: INTERNAL MEDICINE

## 2024-11-05 PROCEDURE — 250N000011 HC RX IP 250 OP 636

## 2024-11-05 PROCEDURE — 90999 UNLISTED DIALYSIS PROCEDURE: CPT

## 2024-11-05 PROCEDURE — 97530 THERAPEUTIC ACTIVITIES: CPT | Mod: GP

## 2024-11-05 PROCEDURE — 36415 COLL VENOUS BLD VENIPUNCTURE: CPT | Performed by: INTERNAL MEDICINE

## 2024-11-05 PROCEDURE — 99024 POSTOP FOLLOW-UP VISIT: CPT

## 2024-11-05 PROCEDURE — 82040 ASSAY OF SERUM ALBUMIN: CPT | Performed by: INTERNAL MEDICINE

## 2024-11-05 PROCEDURE — 120N000001 HC R&B MED SURG/OB

## 2024-11-05 PROCEDURE — 85610 PROTHROMBIN TIME: CPT | Performed by: INTERNAL MEDICINE

## 2024-11-05 PROCEDURE — 250N000013 HC RX MED GY IP 250 OP 250 PS 637: Performed by: PODIATRIST

## 2024-11-05 PROCEDURE — 250N000011 HC RX IP 250 OP 636: Mod: JZ | Performed by: STUDENT IN AN ORGANIZED HEALTH CARE EDUCATION/TRAINING PROGRAM

## 2024-11-05 RX ORDER — AMOXICILLIN AND CLAVULANATE POTASSIUM 250; 125 MG/1; MG/1
1 TABLET, FILM COATED ORAL
Status: DISCONTINUED | OUTPATIENT
Start: 2024-11-05 | End: 2024-11-09 | Stop reason: HOSPADM

## 2024-11-05 RX ORDER — B COMPLEX, C NO.20/FOLIC ACID 1 MG
1 CAPSULE ORAL DAILY
Status: DISCONTINUED | OUTPATIENT
Start: 2024-11-05 | End: 2024-11-09 | Stop reason: HOSPADM

## 2024-11-05 RX ADMIN — PIPERACILLIN AND TAZOBACTAM 2.25 G: 2; .25 INJECTION, POWDER, FOR SOLUTION INTRAVENOUS at 09:43

## 2024-11-05 RX ADMIN — TORSEMIDE 100 MG: 100 TABLET ORAL at 09:42

## 2024-11-05 RX ADMIN — Medication 1 CAPSULE: at 15:19

## 2024-11-05 RX ADMIN — PANTOPRAZOLE SODIUM 40 MG: 40 TABLET, DELAYED RELEASE ORAL at 20:50

## 2024-11-05 RX ADMIN — CARVEDILOL 6.25 MG: 6.25 TABLET, FILM COATED ORAL at 22:49

## 2024-11-05 RX ADMIN — CLOPIDOGREL BISULFATE 75 MG: 75 TABLET ORAL at 09:42

## 2024-11-05 RX ADMIN — ATORVASTATIN CALCIUM 40 MG: 40 TABLET, FILM COATED ORAL at 22:49

## 2024-11-05 RX ADMIN — DARBEPOETIN ALFA 150 MCG: 150 INJECTION, SOLUTION INTRAVENOUS; SUBCUTANEOUS at 18:32

## 2024-11-05 RX ADMIN — CALCITRIOL CAPSULES 0.25 MCG 0.25 MCG: 0.25 CAPSULE ORAL at 22:49

## 2024-11-05 RX ADMIN — OXYCODONE HYDROCHLORIDE 5 MG: 5 TABLET ORAL at 01:47

## 2024-11-05 RX ADMIN — SACUBITRIL AND VALSARTAN 1 TABLET: 49; 51 TABLET, FILM COATED ORAL at 20:51

## 2024-11-05 RX ADMIN — SEVELAMER CARBONATE 800 MG: 800 TABLET, FILM COATED ORAL at 18:32

## 2024-11-05 RX ADMIN — SEVELAMER CARBONATE 800 MG: 800 TABLET, FILM COATED ORAL at 15:19

## 2024-11-05 RX ADMIN — ALLOPURINOL 200 MG: 100 TABLET ORAL at 20:50

## 2024-11-05 RX ADMIN — PANTOPRAZOLE SODIUM 40 MG: 40 TABLET, DELAYED RELEASE ORAL at 09:42

## 2024-11-05 RX ADMIN — SACUBITRIL AND VALSARTAN 1 TABLET: 49; 51 TABLET, FILM COATED ORAL at 09:42

## 2024-11-05 RX ADMIN — SEVELAMER CARBONATE 800 MG: 800 TABLET, FILM COATED ORAL at 09:43

## 2024-11-05 RX ADMIN — PIPERACILLIN AND TAZOBACTAM 2.25 G: 2; .25 INJECTION, POWDER, FOR SOLUTION INTRAVENOUS at 01:44

## 2024-11-05 RX ADMIN — AMOXICILLIN AND CLAVULANATE POTASSIUM 1 TABLET: 250; 125 TABLET, FILM COATED ORAL at 15:19

## 2024-11-05 RX ADMIN — OXYCODONE HYDROCHLORIDE 5 MG: 5 TABLET ORAL at 11:19

## 2024-11-05 RX ADMIN — INSULIN GLARGINE 35 UNITS: 100 INJECTION, SOLUTION SUBCUTANEOUS at 22:50

## 2024-11-05 NOTE — PROGRESS NOTES
[facility]    Internal Medicine Hospitalist Progress Note  11/05/2024  I evaluated patient on the above date.    Ed Bonilla Jr., MD  [phone] (p)  Text Page  Vocera      [New actions/orders today (11/05/2024) are underlined in the assessment and plan. All lab results in the assessment and plan were reviewed.]  Assessment & Plan   Mr. Arnulfo Pritchett is a 62 yo male with history including DM2; HTN; CHF (EF in the 20's); CAD; PAD with prior revascularization; ESRD on PD; gout; HLD; TALI (intolerant to CPAP); and h/o RCC s/p right nephrectomy (2022). He presented to [facility] 10/15/2024 with worsening pain and redness in the left great toe with streaking up the leg. There was concern for limb ischemia and transferred to [facility] 10/16/2024 for management.    Underwent left lower extremity revascularization including bypass 10/25/2024. Underwent left great toe amputation 10/27/2024. Developed right groin wound infection requiring antibiotics and aggressive wound cares, with subsequently improvement. Overall, progressing and plan TCU vs ARU.      # Critical limb ischemia of the left lower extremity.  # S/p left femoral endarterectomy with bovine pericardial patch angioplasty; left distal common femoral artery/proximal superficial femoral artery to tibioperoneal trunk bypass; and temporary closure of left groin wound with wound VAC 10/25/2024.  # S/p ligation of side branches of the left great saphenous vein and temporary closure of right groin with application of wound VAC 10/27/2024.  # Ischemic left great toe wound - s/p amputation of left great toe 10/27/2024.  # Right groin wound infection.  # H/o acute RLE arterial occlusion s/p SFA popliteal balloon angioplasty and stenting (5/2024).  * Initial presentation to OSH 10/15 as above. On initial evaluation, pt was afebrile, hemodynamically stable. Labs notable for CBC with WBC normal, hgb 12.5; cr 9.84 (chronic PD); CRP 55.81; INR 4.42.  Transferred to [facility] 10/16.  * Started on piperacillin-tazobactam on admit. Vascular surgery and podiatry consulted. Warfarin held on admit (in part for supratherapeutic INR).  * 10/16: MRI left foot  showed constellation of findings favoring gangrene; no other osseous abnormalities to suggest osteomyelitis; degenerative changes.  * 10/17: IR left lower extremity angiogram 10/17 abandoned due to limited arterial access options. CTA showed significant bilateral PAD including left lower extremity focal calcified severe stenosis of the proximal superficial femoral artery, long segment occlusion of the mid to distal popliteal artery with low attenuation changes which may represent thrombus or soft atherosclerotic plaque, reconstitution of the origins of the posterior tibial arteries and severe tandem stenoses of the distal posterior tibial artery.  * 10/20: Started heparin gtt.  * 10/22: ID consulted. Piperacillin-tazobactam discontinued and changed to amoxicillin-clavulanate.  * 10/25: Underwent left lower extremity bypass as above.  * 10/27: Underwent left great toe amputation as above. Later noted a distinct loss of signals on a.m. rounds; duplex sonography showed flow reversal in the mid=graft with paucity of flow going beyond mid-graft. Taken urgently for surgery as above.  * 10/29: Warfarin restarted. Clopidogrel restarted (had been on ASA this hospitalization). Stopped amoxicillin-clavulanate.  * 11/1: Heparin gtt stopped. Restarted on piperacillin-tazobactam by vascular for possible left groin wound infection.  Recent Labs   Lab 11/05/24  0648 11/04/24  0708 11/03/24  0657 11/02/24  0648 11/01/24  0713 10/31/24  0711   INR 3.20* 2.79* 2.46* 2.48* 2.18* 1.69*   - Post-op management per vascular surgery.  - Discontinue piperacillin-tazobactam (re-started 11/1 by vascular) and start amoxicillin-clavulanate to stop after 11/11.  - Continue clopidogrel and atorvastatin.  - Continue warfarin.  - Continue PT and OT -  plan TCU, ?may benefit from ARU, ask PT and OT to re-assess.  - Pain management as noted below.  - Appreciate consultant help.      Pain control.  * This hospitalization, noted that patient wanted low-dose pain meds for pain control.  * Ultimately started on low dose PRN opioids (see prior notes).  - Continue PRN oxycodone 2.5-5 mg q4h; PRN IV hydromorphone 0.1-0.2 mg q2h; minimize opioids as able.  - Continue bowel regimen to reduce risk of constipation as noted.     Ischemic CM (HFrEF).  CAD with h/o stents x3 (last in 2021).   Hypertension (benign essential).  HLD.  [PTA BP meds: carvedilol 12.5 mg at bedtime; sacubitril-valsartan 49-51 mg BID; torsemide 100 mg qam.]  * Echo 6/2024 showed LVEF 20-25%, segmental wall motion globally hypokinetic.   * Seen by cardiology this hospitalization.  * Nuc stress test 10/22 was abnormal but no evidence of ischemia; medium sized area of infarction in the entire inferolateral segment(s) of the left ventricle extending into basal inferior segment; small area of nontransmural infarction in the apical segment(s) of the left ventricle; TID absent; left ventricular ejection fraction at stress 26%.  - Continue atorvastatin and clopidogrel.  - Continue carvedilol, sacubitril-valsartan and torsemide.  - Continue to monitor daily wts.  - Additional volume management via PD as noted.    Chronic paroxysmal afib s/p atrial ablation (6/2023).  - Continue carvedilol.  - Continue anticoagulation as above.    Acute blood loss anemia on chronic anemia.  * Hgb 10.9 on admit and with gradual drop in hgb in setting of above surgeries.  * Ordered transfusion of 1U prbc's for hgb 6.9 on 11/4.  Recent Labs   Lab 11/05/24  0648 11/04/24  1048 11/02/24  0648 11/01/24  0713 10/31/24  0711 10/30/24  0721   HGB 7.7* 6.9* 7.4* 7.3* 8.5* 7.1*   - Continue to monitor CBC periodically as needed.  - Consider prbc transfusion if hgb </= 7.0 or if significant bleeding with hemodynamic instability or if  symptomatic.    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  Secondary hyperparathyroidism  Hyperkalemia, resolved.  * PTA on nightly PD of which he has been tolerating. Access via RLQ double cuffed coiled PD catheter placed 4/16/2021.   * Nephrology consulted for PD.  * Treated with sodium zirconium cyclosilicate for hyperkalemia this hospitalization.  * K normalized 10/17.  - Continue nightly PD per nephrology.  - Continue calcitriol, cinacalcet and sevelamer carbonate.  - Continue every other day gentamicin cream to PD cath site.  - Appreciate nephrology help - I d/w Dr. Guzman 11/4.     Hyperenhancing left lower pole renal nodule on CT.  Hx of RCC s/p R nephrectomy (5/2022).  *  CTA 10/17 also showed a 4 mm hyperenhancing nodule in lower pole of the left kidney, noted  that this may represent a small primary renal cell carcinoma.  * Pt made aware of the above finding.  - Pt is aware of this finding and will follow-up closely with his urologist after discharge.     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  [PTA: glargine 39U at bedtime.]  * Hgba1c 5.7 10/16/2024.  * Pt declined diabetic diet this hospitalization.  Recent Labs   Lab 11/05/24  0648 11/05/24  0435 11/05/24  0402 11/05/24  0302 11/05/24  0213 11/04/24  2119   * 106* 68* 76 45* 124*     Recent Labs   Lab Test 10/16/24  0642 07/09/24  0914   A1C 5.7* 5.9*   - Continue carb controlled diet (pt refuses diabetic diet).  - Continue glargine 35U at bedtime.  - Continue aspart ISS (high).     Constipation.  * Noted that pt struggles with constipation at times.  - Continue scheduled polyethylene glycol and senna-docusate PRN senna-docusate; PRN bisacodyl suppository.    Gout.  - Continue allopurinol.    GERD.  - Continue PPI.    TALI.  - Noted intolerant to CPAP.  - Continue to monitor clinically.    Clinically Significant Risk Factors         # Hyponatremia: Lowest Na = 132 mmol/L in last 2 days, will monitor as appropriate  # Hypochloremia: Lowest Cl = 94 mmol/L  "in last 2 days, will monitor as appropriate   # Hypercalcemia: corrected calcium is >10.1, will monitor as appropriate    # Hypoalbuminemia: Lowest albumin = 2 g/dL at 11/5/2024  6:48 AM, will monitor as appropriate     # Hypertension: Noted on problem list           # Overweight: Estimated body mass index is 25.15 kg/m  as calculated from the following:    Height as of 10/8/24: 1.82 m (5' 11.65\").    Weight as of this encounter: 83.3 kg (183 lb 10.3 oz).   # Moderate Malnutrition: based on nutrition assessment      # Financial/Environmental Concerns: none         Diet: Combination Diet Renal Diet (dialysis); 2 gm NA Diet  Snacks/Supplements Adult: Nepro Oral Supplement; Between Meals    Prophylaxis: PCD's, ambulation, and is on warfarin.  Rosario Catheter: Not present  Lines: None     Code Status: Full Code    Disposition Plan   Medically Ready for Discharge: Anticipated in 1-2 Days  Expected discharge to transitional care unit (TCU) pending stability/improvement/resolution of above acute issues, consultant evaluation/recommendations/clearance, and TCU/rehab/facility bed availability.    Entered: Ed Bonilla MD 11/05/2024, 11:47 AM       Interval History   More pain after mobilizing yesterday requiring pain med.  Still with pain today.    * Data reviewed today: I reviewed all new labs and imaging over the last 24 hours. I personally reviewed no images or ECG's today.    Physical Exam   Most recent vitals:   , Blood pressure 127/79, pulse 91, temperature 97.4  F (36.3  C), temperature source Oral, resp. rate 16, weight 83.3 kg (183 lb 10.3 oz), SpO2 100%. O2 Device: None (Room air)    Vitals:    11/03/24 2155 11/04/24 0629 11/05/24 0613   Weight: 78.6 kg (173 lb 4.5 oz) 79.9 kg (176 lb 2.4 oz) 83.3 kg (183 lb 10.3 oz)     Vital signs with ranges:  Temp:  [97.3  F (36.3  C)-98.4  F (36.9  C)] 97.4  F (36.3  C)  Pulse:  [] 91  Resp:  [16-18] 16  BP: (113-143)/(75-90) 127/79  SpO2:  [97 %-100 %] 100 " %  Patient Vitals for the past 24 hrs:   BP Temp Temp src Pulse Resp SpO2 Weight   11/05/24 0724 127/79 97.4  F (36.3  C) Oral 91 16 100 % --   11/05/24 0613 -- -- -- -- -- -- 83.3 kg (183 lb 10.3 oz)   11/05/24 0228 113/75 98  F (36.7  C) Oral 89 16 97 % --   11/04/24 2141 (!) 136/90 98.3  F (36.8  C) Oral 107 18 97 % --   11/04/24 1727 (!) 143/87 98.4  F (36.9  C) -- 100 -- -- --   11/04/24 1541 118/75 98.2  F (36.8  C) Oral 95 18 99 % --   11/04/24 1453 (!) 140/88 97.3  F (36.3  C) -- 97 -- -- --   11/04/24 1427 136/83 98.3  F (36.8  C) -- 96 -- -- --     I/O's last 24 hours:  I/O last 3 completed shifts:  In: 1779.67 [P.O.:1260; I.V.:220]  Out: 950 [Urine:950]    Constitutional: awake, alert, oriented, pleasant, fatigued appearing   Head:   Eyes:   ENT:   Neck:   Cardiovascular: regular rate and rhythm; no murmurs, rubs or gallops  Lungs: diminished in the bases, no crackles or wheezes  Gastrointestinal/Abdomen: soft, non-tender, non-distended, positive bowel sounds  :   Musculoskeletal:   Skin/Extremities:   Neurologic:   Psychiatric:   Hematologic/Lymphatic/Immunologic:        Labs reviewed.  Recent Labs   Lab 11/05/24  0648 11/05/24  0435 11/05/24  0402 11/04/24  1303 11/04/24  1048 11/04/24  0854 11/04/24  0708 11/03/24  0813 11/03/24  0657 11/02/24  0939 11/02/24  0648   WBC 9.1  --   --   --  10.1  --   --   --   --   --  11.0   HGB 7.7*  --   --   --  6.9*  --   --   --   --   --  7.4*   MCV 98  --   --   --  100  --   --   --   --   --  100     --   --   --  331  --   --   --  330  --  340   INR 3.20*  --   --   --   --   --  2.79*  --  2.46*  --  2.48*   *  --   --   --  135  --   --   --  134*  --  132*   POTASSIUM 4.8  --   --   --  4.3  --   --   --  4.0  --  4.0   CHLORIDE 94*  --   --   --  94*  --   --   --  93*  --  92*   CO2 25  --   --   --  28  --   --   --  26  --  24   BUN 56.4*  --   --   --  51.9*  --   --   --  52.9*  --  54.9*   CR 7.85*  --   --   --  7.30*  --   --   --   7.41*  --  7.58*   ANIONGAP 13  --   --   --  13  --   --   --  15  --  16*   TERENCE 8.9  --   --   --  9.6  --   --   --  9.5  --  9.4   * 106* 68*   < > 136*   < >  --    < > 216*   < > 207*   ALBUMIN 2.0*  --   --   --  2.1*  --   --   --  2.0*  --  2.2*    < > = values in this interval not displayed.     No lab results found.  Recent Labs   Lab 11/05/24  0648 11/05/24  0435 11/05/24  0402 11/05/24  0302 11/05/24  0213 11/04/24  2119   * 106* 68* 76 45* 124*     Recent Labs   Lab Test 10/16/24  0642 07/09/24  0914   A1C 5.7* 5.9*       Recent Labs   Lab 11/05/24  0648 11/04/24  0708 11/03/24  0657 11/02/24  0648 11/01/24  0713 10/31/24  0711   INR 3.20* 2.79* 2.46* 2.48* 2.18* 1.69*     Recent Labs   Lab 11/05/24  0648 11/04/24  1048 11/02/24  0648 11/01/24  0713 10/31/24  0711 10/30/24  0721   WBC 9.1 10.1 11.0 14.1* 13.1* 10.8       MICRO:  Cultures (including blood and urine):  No lab results found in last 7 days.    No results found for this or any previous visit (from the past 24 hours).    Medications   All medications were reviewed. MAR.    Infusions:  Current Facility-Administered Medications   Medication Dose Route Frequency Provider Last Rate Last Admin    dianeal PD LOW calcium-2.5% dex (calcium 2.5 mEq/L) 6,000 mL PERITONEAL DIALYSATE   Dialysis [facility] Supplied PD Star Acuna MD        Patient is already receiving anticoagulation with heparin, enoxaparin (LOVENOX), warfarin (COUMADIN)  or other anticoagulant medication   Does not apply Continuous PRN Haley Joseph, DPMEGHAN, Podiatry/Foot and Ankle Surgery         Scheduled Medications:  Current Facility-Administered Medications   Medication Dose Route Frequency Provider Last Rate Last Admin    allopurinol (ZYLOPRIM) tablet 200 mg  200 mg Oral QPM Haley Joseph DPM, Podiatry/Foot and Ankle Surgery   200 mg at 11/04/24 2140    amoxicillin-clavulanate (AUGMENTIN) 250-125 MG per tablet 1 tablet  1 tablet Oral Q24H Ed Maza,  MD        atorvastatin (LIPITOR) tablet 40 mg  40 mg Oral At Bedtime Haley Joseph DPM, Podiatry/Foot and Ankle Surgery   40 mg at 11/04/24 2140    calcitRIOL (ROCALTROL) capsule 0.25 mcg  0.25 mcg Oral At Bedtime Haley Joseph DPM, Podiatry/Foot and Ankle Surgery   0.25 mcg at 11/04/24 2139    carvedilol (COREG) tablet 6.25 mg  6.25 mg Oral At Bedtime Haley Joseph DPM, Podiatry/Foot and Ankle Surgery   6.25 mg at 11/04/24 2141    cinacalcet (SENSIPAR) tablet 60 mg  60 mg Oral Once per day on Monday Wednesday Friday Haley Joseph DPM, Podiatry/Foot and Ankle Surgery   60 mg at 11/04/24 0848    clopidogrel (PLAVIX) tablet 75 mg  75 mg Oral Daily Chai España MD   75 mg at 11/05/24 0942    darbepoetin evaristo-polysorbate (ARANESP) injection 150 mcg  150 mcg Subcutaneous Q14 Days Woodrow Serna MD        insulin aspart (NovoLOG) injection (RAPID ACTING)  1-10 Units Subcutaneous TID AC Chai España MD   1 Units at 11/04/24 1724    insulin aspart (NovoLOG) injection (RAPID ACTING)  1-7 Units Subcutaneous At Bedtime Chai España MD   2 Units at 10/30/24 2133    insulin glargine (LANTUS PEN) injection 35 Units  35 Units Subcutaneous At Bedtime Ed Bonilla MD   35 Units at 11/04/24 2134    multivitamin RENAL (TRIPHROCAPS) capsule 1 capsule  1 capsule Oral Daily Ed Bonilla MD        pantoprazole (PROTONIX) EC tablet 40 mg  40 mg Oral BID Haley Joseph DPM, Podiatry/Foot and Ankle Surgery   40 mg at 11/05/24 0942    polyethylene glycol (MIRALAX) Packet 17 g  17 g Oral Daily Haley Joseph DPM, Podiatry/Foot and Ankle Surgery   17 g at 10/31/24 0843    sacubitril-valsartan (ENTRESTO) 49-51 MG per tablet 1 tablet  1 tablet Oral BID Haley Joseph DPM, Podiatry/Foot and Ankle Surgery   1 tablet at 11/05/24 0942    senna-docusate (SENOKOT-S/PERICOLACE) 8.6-50 MG per tablet 2 tablet  2 tablet Oral BID Sherron Gamble MD   2 tablet at 10/30/24 0450    sevelamer  carbonate (RENVELA) tablet 800 mg  800 mg Oral TID w/meals Haley Joseph DPM, Podiatry/Foot and Ankle Surgery   800 mg at 11/05/24 0943    sodium chloride (PF) 0.9% PF flush 3 mL  3 mL Intracatheter Q8H Haley Joseph DPM, Podiatry/Foot and Ankle Surgery   3 mL at 11/05/24 0942    torsemide (DEMADEX) tablet 100 mg  100 mg Oral Daily Vega Guzman MD   100 mg at 11/05/24 0942    Warfarin Dose Required Daily - Pharmacist Managed  1 each Oral See Admin Instructions Chai España MD         PRN Medications:  Current Facility-Administered Medications   Medication Dose Route Frequency Provider Last Rate Last Admin    acetaminophen (TYLENOL) tablet 650 mg  650 mg Oral Q4H PRN Haley Joseph DPM, Podiatry/Foot and Ankle Surgery   650 mg at 10/31/24 2112    bisacodyl (DULCOLAX) suppository 10 mg  10 mg Rectal Daily PRN Haley Joseph DPM, Podiatry/Foot and Ankle Surgery        calcium carbonate (TUMS) chewable tablet 1,000 mg  1,000 mg Oral 4x Daily PRN Haley Joseph DPM, Podiatry/Foot and Ankle Surgery        glucose gel 15-30 g  15-30 g Oral Q15 Min PRN Haley Joseph DPM, Podiatry/Foot and Ankle Surgery        Or    dextrose 50 % injection 25-50 mL  25-50 mL Intravenous Q15 Min PRN Haley Joseph DPM, Podiatry/Foot and Ankle Surgery        Or    glucagon injection 1 mg  1 mg Subcutaneous Q15 Min PRN Haley Joseph DPM, Podiatry/Foot and Ankle Surgery        dianeal PD LOW calcium-2.5% dex (calcium 2.5 mEq/L) 6,000 mL PERITONEAL DIALYSATE   Dialysis [facility] Supplied PD Star Acuna MD        gentamicin (GARAMYCIN) 0.1 % cream   Topical Daily PRN Star Acuna MD        HYDROmorphone (DILAUDID) injection 0.1 mg  0.1 mg Intravenous Q2H PRN Haley Joseph DPM, Podiatry/Foot and Ankle Surgery        Or    HYDROmorphone (DILAUDID) injection 0.2 mg  0.2 mg Intravenous Q2H PRN Haley Joseph DPM, Podiatry/Foot and Ankle Surgery        lidocaine (LMX4) cream   Topical Q1H PRN Haley Joseph DPM,  Podiatry/Foot and Ankle Surgery        lidocaine 1 % 0.1-1 mL  0.1-1 mL Other Q1H PRN Haley Joseph DPM, Podiatry/Foot and Ankle Surgery        magnesium hydroxide (MILK OF MAGNESIA) suspension 30 mL  30 mL Oral Daily PRN Haley Joseph DPM, Podiatry/Foot and Ankle Surgery        naloxone (NARCAN) injection 0.2 mg  0.2 mg Intravenous Q2 Min PRN Haley Joseph DPM, Podiatry/Foot and Ankle Surgery        Or    naloxone (NARCAN) injection 0.4 mg  0.4 mg Intravenous Q2 Min PRN Haley Joseph DPM, Podiatry/Foot and Ankle Surgery        Or    naloxone (NARCAN) injection 0.2 mg  0.2 mg Intramuscular Q2 Min PRN Haley Joseph DPM, Podiatry/Foot and Ankle Surgery        Or    naloxone (NARCAN) injection 0.4 mg  0.4 mg Intramuscular Q2 Min PRN Haley Joseph DPM, Podiatry/Foot and Ankle Surgery        ondansetron (ZOFRAN ODT) ODT tab 4 mg  4 mg Oral Q6H PRN Haley Joseph DPM, Podiatry/Foot and Ankle Surgery        Or    ondansetron (ZOFRAN) injection 4 mg  4 mg Intravenous Q6H PRN Haley Joseph DPM, Podiatry/Foot and Ankle Surgery   4 mg at 11/01/24 0919    oxyCODONE IR (ROXICODONE) half-tab 2.5 mg  2.5 mg Oral Q4H PRN Haley Joseph DPM, Podiatry/Foot and Ankle Surgery        Or    oxyCODONE (ROXICODONE) tablet 5 mg  5 mg Oral Q4H PRN Haley Joseph DPM, Podiatry/Foot and Ankle Surgery   5 mg at 11/05/24 1119    Patient is already receiving anticoagulation with heparin, enoxaparin (LOVENOX), warfarin (COUMADIN)  or other anticoagulant medication   Does not apply Continuous PRN Haley Joseph DPM, Podiatry/Foot and Ankle Surgery        prochlorperazine (COMPAZINE) injection 5 mg  5 mg Intravenous Q6H PRN Haley Joseph DPM, Podiatry/Foot and Ankle Surgery        Or    prochlorperazine (COMPAZINE) tablet 5 mg  5 mg Oral Q6H PRHaley Altman DPM, Podiatry/Foot and Ankle Surgery        senna-docusate (SENOKOT-S/PERICOLACE) 8.6-50 MG per tablet 1 tablet  1 tablet Oral BID Haley Gray DPM, Podiatry/Foot  and Ankle Surgery   1 tablet at 10/27/24 1116    Or    senna-docusate (SENOKOT-S/PERICOLACE) 8.6-50 MG per tablet 2 tablet  2 tablet Oral BID PRN Haley Joseph DPM, Podiatry/Foot and Ankle Surgery        sodium chloride (PF) 0.9% PF flush 3 mL  3 mL Intracatheter q1 min prn Haley Joseph DPM, Podiatry/Foot and Ankle Surgery   3 mL at 11/04/24 0974

## 2024-11-05 NOTE — PROGRESS NOTES
"CLINICAL NUTRITION SERVICES - REASSESSMENT NOTE    Recommendations Ordered by Registered Dietitian (RD):   - Provided patient with Renal menu for ordering  - Dislikes Glucerna - encouraged pt to drink at least 1 Nepro supplement daily. Ordered.   Add renal multivitamin    Malnutrition:   % Weight Loss:  Unable to fully assess given frequent fluid shifts   % Intake:  <75% for > 7 days (moderate malnutrition)  Subcutaneous Fat Loss:  Orbital region mild depletion and Upper arm region mild depletion  Muscle Loss:  Temporal region mild depletion and Clavicle bone region  mild depletion  Fluid Retention:  trace generalized     Malnutrition Diagnosis: Moderate malnutrition  In Context of:  Acute illness or injury     EVALUATION OF PROGRESS TOWARD GOALS   Diet: Renal Diet + 2g Na diet   Glucerna BID - discontinued 11/4 as pt dislikes     Intake/Tolerance:   - Pt consumes % of meals ordered TID, though meals tend to be on the small side. Often providing <1200 kcal daily. Pt today reports lower appetite. Having difficulty ordering on restricted diet (renal).  - Glucerna was ordered on 11/2, however pt disliked it so supplements were discontinued 11/4.   - Today he agree to drink 1 Nepro supplement daily.     - Na 132 (L)   - BUN 56.4 (H), Cr 7.85 (H), GFR 7 (L), Phos 5.6 (H) --> PD missed last night per RN charting.   - BGM: 45 (VL) overnight.   - Infrequent stooling. BM x1 this morning, last BM x1 on 10/31.   - Elevated wt this morning 83.3 kg - fluid retention.     ANTHROPOMETRICS  Height: 5' 11.65\"  Weight: 73.1 kg (10/28) suspected dry wt  Body mass index is 22 kg/m .  Weight Status:  Normal BMI  IBW: 80 kg   % IBW: 91%  Weight History: Using 73.1 kg as pt's driest wt this admission. Pt denied wt loss during prior RD visit, though difficult to assess affect of fluid shifts vs dry mass loss. Potential for masking in setting of nightly PD.   Wt Readings from Last 10 Encounters:   11/05/24 83.3 kg (183 lb 10.3 oz) "   10/15/24 78.6 kg (173 lb 4.5 oz)   10/08/24 78.7 kg (173 lb 8 oz)   10/07/24 80.7 kg (178 lb)   08/12/24 80.7 kg (178 lb)   07/09/24 80.3 kg (177 lb)     ASSESSED NUTRITION NEEDS PER APPROVED PRACTICE GUIDELINES:  Dosing weight: 73 kg - 10/28  Estimated Energy Needs: 8697-4815 kcals (25-30 Kcal/Kg)  Justification: dialysis   Estimated Protein Needs:  grams protein (1.2-1.8 g pro/Kg)  Justification: dialysis  Estimated Fluid Needs: Per provider pending fluid status     NEW FINDINGS:   - Missed PD overnight.   - Plan discharge as soon as tomorrow.     Previous Nutrition Diagnosis:   No nutrition diagnosis at this time.   Evaluation: Declining    MALNUTRITION  % Weight Loss:  Unable to fully assess given frequent fluid shifts   % Intake:  <75% for > 7 days (moderate malnutrition)  Subcutaneous Fat Loss:  Orbital region mild depletion and Upper arm region mild depletion  Muscle Loss:  Temporal region mild depletion and Clavicle bone region  mild depletion  Fluid Retention:  trace generalized     Malnutrition Diagnosis: Moderate malnutrition  In Context of:  Acute illness or injury    CURRENT NUTRITION DIAGNOSIS  Inadequate oral intake related to patient reporting declining appetite and menu fatigue as evidenced by small meals ordered often providing <50-75% of estimated needs.     INTERVENTIONS  Recommendations / Nutrition Prescription  Renal diet per care team  Nepro - drink at least 1 /day  Add Renal Multivit     Implementation  Medical Food Supplement and Multivitamin/Mineral: updated    Goals  Intake of at least 75% adequate trays TID + 1 nepro supplement.     MONITORING AND EVALUATION:  Progress towards goals will be monitored and evaluated per protocol and Practice Guidelines    Allison Garcia RD, LD  Pager: [phone]  Available on [employer]

## 2024-11-05 NOTE — PROGRESS NOTES
Care Management Follow Up    Length of Stay (days): 21    Expected Discharge Date: 2024     Concerns to be Addressed: discharge planning  wants PCP f/up scheduled and need to check if [facility] can change to smaller PD bags  Patient plan of care discussed at interdisciplinary rounds: Yes    Anticipated Discharge Disposition: Skilled Nursing Facility     Anticipated Discharge Services: Other (see comment) (Patient is planning to hire a private duty PCA)  Anticipated Discharge DME: None    Patient/family educated on Medicare website which has current facility and service quality ratings:    Education Provided on the Discharge Plan: Other (see comments) (ongoing)  Patient/Family in Agreement with the Plan: yes    Referrals Placed by CM/SW: Internal Clinic Care Coordination, Specialty Providers, Scheduled Follow-up appointments  Private pay costs discussed: Not applicable    Discussed  Partnership in Safe Discharge Planning  document with patient/family: Yes:     Handoff Completed: No, handoff not indicated or clinically appropriate    Additional Information:  Writer Checked on Medicare.gov for TCU facilities near [facility]. Called and left messages to see if any TCU facilities accept [facility] peritoneal dialysis.     Sent a referral to [facility] checking with ARU's. [facility] ARU needs patient to be able to tolerate 3 hours of PT and OT each day and needs updated therapy notes faxed to 1 [phone], communicated with Janay.     The following facilities are still pending     Destination    Coordination complete.  Service Provider Request Status Services Address Phone Fax Patient Preferred   [facility] (SNF)  Selected Skilled Nursing [address] [facility] [address] [phone] --   Current Capacity last updated by Tuan Cook RN on 8/3/2024 12:38 PM    TCU Address on different side of bulidin [address] [facility] [address]            Internal Comment last updated  by Senia Pineda 10/31/2024 11:33 AM    10/31/24 1128: I called admissions and they will review. [facility] ([facility]) Pending - Request Sent -- [address]  [place] [id number] [phone] --   Current Capacity last updated by Calli Carroll LICSW on 12/22/2023  9:35 AM    Full for two weeks as of 12/22/23, , [phone]            [facility] ACUTE REHABILITATION UNIT (CIRF) Pending - Request Sent -- [address] [facility] [address] [phone] --   Current Capacity last updated by Calli Carroll LICSW on 12/22/2023  9:35 AM    [phone] [phone]            [facility] TRANSITIONAL CARE Pending - [facility] Sent [facility] [address] [id number] [phone] --   [facility] ([facility]) Pending - Request Sent -- [address] [place], [place] [id number] [phone] --   [facility] ([facility]) Pending - Request Sent -- [address], [place] [id number] [phone] --       [facility] has accepted.     Next Steps:   Fax updated therapy notes, wait to hear if alternative facilities accept [facility] peritoneal dialysis    ADD - need updated PT/OT notes to see if patient can tolerate 3 hours of therapies         KATELYN Duarte

## 2024-11-05 NOTE — PLAN OF CARE
Date & Time: 1900-0730.  Surgery/POD#: POD 11 from a L Femoral Endartectomy, L Femoral to Tibial peroneal Trunk Bypass w/ L great sephaneous vein. POD 8 L great toe amputation.    Behavior & Aggression: Green - no concerns.  Fall Risk: Yes.  Orientation:A&Ox4.  ABNL VS/O2:VSS RA, exp tachycardia. Denied N/V.  ABNL Labs: see chart. Hgb 6.9 - recheck in am labs. BG were 124, 45 - gave apple juice x 2 & some gram crackers; recheck came back 106.  Pain Management: PRN Oxy.  Bowel/Bladder: Continent of B/B -Urinal at bedside.   IV/Drains: PIVs SL. Peritoneal dialysis site.  Wounds/incisions: LLE incision/ L Groin incision/ Left toe incision are C/D/I. Noted new self inflicted abrasion/ skin tear behind R ear - put gauze bandage on.   Diet:2 G na/Renal  Activity Level: Ax1 GB&W- needs encouragement to reposition/ ambulate.  Tests/Procedures: N/A.  Anticipated  DC Date: TBD.  Significant information: Doppler pulses & CMS intanct. Pt did not complete PD overnight d/t accidentally starting dose before being connected to pump w/ it leaking all over floor and no staff to restart machine.

## 2024-11-05 NOTE — PROGRESS NOTES
Date & Time: 11/5/2024   Surgery/POD#: POD11 s/p fem bypass   Behavior & Aggression: green  Fall Risk: yes  Orientation:A&O4  ABNL VS/O2:none  ABNL Labs: Cr+>7  Pain Management:denies  Bowel/Bladder: continent, no BM today  Drains: none  Wounds/incisions L groin incision changed by vascular today. CDI  Diet:Renal  Activity Level: up 1 GBW  Tests/Procedures: none  Anticipated  DC Date: pending possibly Thursday   Significant Information: none

## 2024-11-05 NOTE — PROGRESS NOTES
[facility]     Renal Progress Note       SHORTHAND KEY FOR MY NOTES:  c = with, s = without, p = after, a = before, x = except, asx = asymptomatic, tx = transplant or treatment, sx = symptoms or symptomatic, cx = canceled or culture, rxn = reaction, yday = yesterday, nl = normal, abx = antibiotics, fxn = function, dx = diagnosis, dz = disease, m/h = melena/hematochezia, c/d/l/ha = cramping/dizziness/lightheadedness/headache, d/c = discharge or diarrhea/constipation, f/c/n/v = fevers/chills/nausea/vomiting, cp/sob = chest pain/shortness of breath, tbv = total body volume, rxn = reaction, tdc = tunneled dialysis catheter, pta = prior to admission, hd = hemodialysis, pd = peritoneal dialysis, hhd = home hemodialysis, edw = estimated dry wt         Assessment/Plan:     1.  ESKD.  Pt's sugars are better controlled so he should be able to UF fine c the 2.5% bags.    A.  Continue same regimen - all 2.5% bags.  B.  Follow labs daily.  C.  He can only go to the [facility].  There are a number of logistics that must be figured out, including getting his home supplies to the TCU.  He is going to talk to his son today.    2.  Severe anemia.  It's unclear where the pt is bleeding.  The groin oozing has stopped.  He rec'd darbo yday.  A.  Follow hb, clinically.  B.  Transfuse prn.  C.  Hb should be stable for a couple of days before discharge.    3.  Severe LLE PAD.  Pt is s/p angio.  His pain is decently controlled.  A.  Pain meds prn.  B.  Further plans per Vasc Surg.    4.  HTN.  Pt's BP is controlled c the current regimen.  A.  Continue same meds/doses for now.  B.  If the BP drops, decrease carvedilol first.    5.  DM2.  Pt's sugars are finally better controlled c the current regimen.  He had a low sugar this AM, partly bc he didn't eat enough.  A.  Continue glargine 35p.  B.  Continue high ISS.    6.  FEN.  Electrolytes are ok.    Case d/w Dr. Bonilla.        Interval History:     Pt has not  noticed any bleeding.  He rec'd some blood last night.  There was a snafu c PD last night and the machine was accidentally started before he was connected and the fluids leaked everywhere.  He is breathing fine.  He had a low sugar earlier today that r/w apple juice, OJ, jake crackers.  He ate later than usual today.          Medications and Allergies:     Current Facility-Administered Medications   Medication Dose Route Frequency Provider Last Rate Last Admin    allopurinol (ZYLOPRIM) tablet 200 mg  200 mg Oral QPM Haley Joseph DPM, Podiatry/Foot and Ankle Surgery   200 mg at 11/04/24 2140    amoxicillin-clavulanate (AUGMENTIN) 250-125 MG per tablet 1 tablet  1 tablet Oral Q24H [facility] Ed Bonilla MD   1 tablet at 11/05/24 1519    atorvastatin (LIPITOR) tablet 40 mg  40 mg Oral At Bedtime Haley Joseph DPM, Podiatry/Foot and Ankle Surgery   40 mg at 11/04/24 2140    calcitRIOL (ROCALTROL) capsule 0.25 mcg  0.25 mcg Oral At Bedtime Haley Joseph DPM, Podiatry/Foot and Ankle Surgery   0.25 mcg at 11/04/24 2139    carvedilol (COREG) tablet 6.25 mg  6.25 mg Oral At Bedtime Haley Joseph DPM, Podiatry/Foot and Ankle Surgery   6.25 mg at 11/04/24 2141    cinacalcet (SENSIPAR) tablet 60 mg  60 mg Oral Once per day on Monday Wednesday Friday Haley Joseph DPM, Podiatry/Foot and Ankle Surgery   60 mg at 11/04/24 0848    clopidogrel (PLAVIX) tablet 75 mg  75 mg Oral Daily Chai España MD   75 mg at 11/05/24 0942    darbepoetin evaristo-polysorbate (ARANESP) injection 150 mcg  150 mcg Subcutaneous Q14 Days Woodrow Serna MD        insulin aspart (NovoLOG) injection (RAPID ACTING)  1-10 Units Subcutaneous TID AC Chai España MD   1 Units at 11/04/24 1724    insulin aspart (NovoLOG) injection (RAPID ACTING)  1-7 Units Subcutaneous At Bedtime Chai España MD   2 Units at 10/30/24 2133    insulin glargine (LANTUS PEN) injection 35 Units  35 Units Subcutaneous At Bedtime Ed Bonilla  MD Lam   35 Units at 11/04/24 2134    multivitamin RENAL (TRIPHROCAPS) capsule 1 capsule  1 capsule Oral Daily Ed Bonilla MD   1 capsule at 11/05/24 1519    pantoprazole (PROTONIX) EC tablet 40 mg  40 mg Oral BID Haley Joseph DPM, Podiatry/Foot and Ankle Surgery   40 mg at 11/05/24 0942    polyethylene glycol (MIRALAX) Packet 17 g  17 g Oral Daily Haley Joseph DPM, Podiatry/Foot and Ankle Surgery   17 g at 10/31/24 0843    sacubitril-valsartan (ENTRESTO) 49-51 MG per tablet 1 tablet  1 tablet Oral BID Haley Joseph DPM, Podiatry/Foot and Ankle Surgery   1 tablet at 11/05/24 0942    senna-docusate (SENOKOT-S/PERICOLACE) 8.6-50 MG per tablet 2 tablet  2 tablet Oral BID Sherron Gamble MD   2 tablet at 10/30/24 2131    sevelamer carbonate (RENVELA) tablet 800 mg  800 mg Oral TID w/meals Haley Joseph DPM, Podiatry/Foot and Ankle Surgery   800 mg at 11/05/24 1519    sodium chloride (PF) 0.9% PF flush 3 mL  3 mL Intracatheter Q8H Haley Joseph DPM, Podiatry/Foot and Ankle Surgery   3 mL at 11/05/24 0942    torsemide (DEMADEX) tablet 100 mg  100 mg Oral Daily Vega Guzman MD   100 mg at 11/05/24 0942    Warfarin Dose Required Daily - Pharmacist Managed  1 each Oral See Admin Instructions Chai España MD        warfarin-No DOSE today  1 each Does not apply no dose today (warfarin) Chai España MD         No Known Allergies       Physical Exam:     Vitals were reviewed     , Blood pressure (!) 145/95, pulse 102, temperature 97.7  F (36.5  C), temperature source Oral, resp. rate 16, weight 83.3 kg (183 lb 10.3 oz), SpO2 98%.  Wt Readings from Last 3 Encounters:   11/05/24 83.3 kg (183 lb 10.3 oz)   10/15/24 78.6 kg (173 lb 4.5 oz)   10/08/24 78.7 kg (173 lb 8 oz)     Intake/Output Summary (Last 24 hours) at 11/5/2024 1612  Last data filed at 11/5/2024 1500  Gross per 24 hour   Intake 1729.67 ml   Output 375 ml   Net 1354.67 ml     GENERAL APPEARANCE: pleasant, NAD, interactive,  facial edema  RESP: CTA B c good efforts  CV: RRR c 2/6 m, nl S1/S2   ABDOMEN: s/nt/nd, + PD catheter  EXTREMITIES/SKIN: + edema; LLE dressed - slightly tender to palpation  GROIN:  no oozing         Data:     CBC RESULTS:     Recent Labs   Lab 11/05/24  0648 11/04/24  1048 11/03/24  0657 11/02/24  0648 11/01/24  0713 10/31/24  0711 10/30/24  0721   WBC 9.1 10.1  --  11.0 14.1* 13.1* 10.8   RBC 2.33* 2.08*  --  2.26* 2.22* 2.57* 2.11*   HGB 7.7* 6.9*  --  7.4* 7.3* 8.5* 7.1*   HCT 22.9* 20.7*  --  22.5* 21.1* 24.2* 20.6*    331 330 340 362 317 311     Basic Metabolic Panel:  Recent Labs   Lab 11/05/24  1415 11/05/24  0648 11/05/24  0435 11/05/24  0402 11/05/24  0302 11/05/24  0213 11/04/24  1303 11/04/24  1048 11/03/24  0813 11/03/24  0657 11/02/24  0939 11/02/24  0648 11/01/24  0913 11/01/24  0713 10/31/24  0856 10/31/24  0711   NA  --  132*  --   --   --   --   --  135  --  134*  --  132*  --  133*  --  132*   POTASSIUM  --  4.8  --   --   --   --   --  4.3  --  4.0  --  4.0  --  4.2  --  4.0   CHLORIDE  --  94*  --   --   --   --   --  94*  --  93*  --  92*  --  94*  --  94*   CO2  --  25  --   --   --   --   --  28  --  26  --  24  --  26  --  28   BUN  --  56.4*  --   --   --   --   --  51.9*  --  52.9*  --  54.9*  --  55.7*  --  55.6*   CR  --  7.85*  --   --   --   --   --  7.30*  --  7.41*  --  7.58*  --  7.64*  --  7.56*   GLC 70 111* 106* 68* 76 45*   < > 136*   < > 216*   < > 207*   < > 135*   < > 215*   TERENCE  --  8.9  --   --   --   --   --  9.6  --  9.5  --  9.4  --  9.8  --  9.3    < > = values in this interval not displayed.     INR  Recent Labs   Lab 11/05/24  0648 11/04/24  0708 11/03/24  0657 11/02/24  0648   INR 3.20* 2.79* 2.46* 2.48*      Attestation:   I have reviewed today's relevant vital signs, notes, medications, labs and imaging.    Vega Guzman MD  [facility] Nephrology  [phone]

## 2024-11-05 NOTE — PROGRESS NOTES
Care Management Follow Up    Length of Stay (days): 21    Expected Discharge Date: 11/07/2024     Concerns to be Addressed: discharge planning  wants PCP f/up scheduled and need to check if [facility] can change to smaller PD bags  Patient plan of care discussed at interdisciplinary rounds: Yes    Anticipated Discharge Disposition: Skilled Nursing Facility     Anticipated Discharge Services: Other (see comment) (Patient is planning to hire a private duty PCA)  Anticipated Discharge DME: None    Patient/family educated on Medicare website which has current facility and service quality ratings:    Education Provided on the Discharge Plan: Other (see comments) (ongoing)  Patient/Family in Agreement with the Plan: yes    Referrals Placed by CM/SW: Internal Clinic Care Coordination, Specialty Providers, Scheduled Follow-up appointments  Private pay costs discussed: Not applicable    Discussed  Partnership in Safe Discharge Planning  document with patient/family: Yes:     Handoff Completed: No, handoff not indicated or clinically appropriate    Additional Information:  Writer received a call from Luis Alberto at [facility] - Luis Alberto needs confirmation on where patient is discharging as patient will need to bring his Cyclier machine - and PD supplies (as much as he has from home) Luis Alberto is meeting with staff at [facility] Thursday to perform, teaching and assures writer that he has trained and worked with [facility] nursing staff in the past.     Next Steps: talk to patient and family about [facility] and getting supplies there Thursday    ADD - Writer spoke to patient about bringing supplies, patient will ask his son to go to his apartment and bring supplies to [facility]. Patient communicated with [facility] and will call back tomorrow with an update.         KATELYN Duarte

## 2024-11-05 NOTE — PROGRESS NOTES
VASCULAR SURGERY PROGRESS NOTE    Subjective:  Feeling well this am, no fevers or chills.     Objective:  Intake/Output Summary (Last 24 hours) at 11/5/2024 0821  Last data filed at 11/5/2024 0400  Gross per 24 hour   Intake 1779.67 ml   Output 650 ml   Net 1129.67 ml     PHYSICAL EXAM:  /79 (BP Location: Right arm)   Pulse 91   Temp 97.4  F (36.3  C) (Oral)   Resp 16   Wt 83.3 kg (183 lb 10.3 oz)   SpO2 100%   BMI 25.15 kg/m    Alert and oriented x4, neurologically intact  NLB on RA, soft abdomen, nondistended, nontender  Biphasic DP And PT signals  Lower extremity incisions intact  Left groin incision continues to improve, reapproximated with small openings, less erythema    ASSESSMENT:  64 year old male POD10 s/p fem-TP trunk bypass with takeback to OR for ligation of venous sidebranches. Doing well post-operatively. Groin incision healing.      PLAN:  -can transition to PO antibiotics  -plan for discharge to TCU in 1-2 days  -dressing change daily for groin with surgery team   -encourage ambulation    Cecilia Stearns NP  VASCULAR SURGERY

## 2024-11-05 NOTE — PROGRESS NOTES
Pt accidentally started dialysis machine before it was connected to him and bag leaked all over the floor for several minutes without realizing. Cycle is half way through the 1st of 5 and no nursing staff on floor is trained to restart machine. Pt is fine with not receiving dose overnight.

## 2024-11-05 NOTE — PROGRESS NOTES
Cycler set up per protocol and per treatment orders     Results from previous treatment:  Initial Drain: pt didn't run  Avg Dwell: pt didn't run  Lost Dwell: pt didn't run    Cycler Serial Number: [id number]  Total Treatment Volume: [id number]  Total treatment time: 9 hrs  Fill Volume: 2000 ml  Last Fill Volume:0 ml  Heater ba.5% 6000mL;  Lot #: [id number];  Expiration Date:   Side Bag #1: 2.5% 6000mL;  Lot #: [id number];  Expiration Date:   Number of cycles including final fill: 5  Dwell Time: 0 minutes  Last Fill Volume: 0ml    PD orders reviewed with Patient procedure and ESRD teaching done and questions answered.  Cycler ready for hook-up.    Report given to: Mariah DYER RN  [facility] consent form signed yes Cycler set up per protocol and per treatment orders     Casette: lot [id number], exp: 10/27/28

## 2024-11-06 ENCOUNTER — APPOINTMENT (OUTPATIENT)
Dept: PHYSICAL THERAPY | Facility: CLINIC | Age: 64
DRG: 252 | End: 2024-11-06
Attending: INTERNAL MEDICINE
Payer: MEDICARE

## 2024-11-06 ENCOUNTER — APPOINTMENT (OUTPATIENT)
Dept: OCCUPATIONAL THERAPY | Facility: CLINIC | Age: 64
DRG: 252 | End: 2024-11-06
Attending: INTERNAL MEDICINE
Payer: MEDICARE

## 2024-11-06 LAB
ALBUMIN SERPL BCG-MCNC: 2 G/DL (ref 3.5–5.2)
ANION GAP SERPL CALCULATED.3IONS-SCNC: 12 MMOL/L (ref 7–15)
BUN SERPL-MCNC: 52.9 MG/DL (ref 8–23)
CALCIUM SERPL-MCNC: 9.4 MG/DL (ref 8.8–10.4)
CHLORIDE SERPL-SCNC: 93 MMOL/L (ref 98–107)
CREAT SERPL-MCNC: 7.63 MG/DL (ref 0.67–1.17)
EGFRCR SERPLBLD CKD-EPI 2021: 7 ML/MIN/1.73M2
ERYTHROCYTE [DISTWIDTH] IN BLOOD BY AUTOMATED COUNT: 18.5 % (ref 10–15)
GLUCOSE BLDC GLUCOMTR-MCNC: 101 MG/DL (ref 70–99)
GLUCOSE BLDC GLUCOMTR-MCNC: 122 MG/DL (ref 70–99)
GLUCOSE BLDC GLUCOMTR-MCNC: 152 MG/DL (ref 70–99)
GLUCOSE BLDC GLUCOMTR-MCNC: 223 MG/DL (ref 70–99)
GLUCOSE BLDC GLUCOMTR-MCNC: 231 MG/DL (ref 70–99)
GLUCOSE SERPL-MCNC: 193 MG/DL (ref 70–99)
HCO3 SERPL-SCNC: 25 MMOL/L (ref 22–29)
HCT VFR BLD AUTO: 25.5 % (ref 40–53)
HGB BLD-MCNC: 8.6 G/DL (ref 13.3–17.7)
INR PPP: 2.61 (ref 0.85–1.15)
MCH RBC QN AUTO: 32.5 PG (ref 26.5–33)
MCHC RBC AUTO-ENTMCNC: 33.7 G/DL (ref 31.5–36.5)
MCV RBC AUTO: 96 FL (ref 78–100)
PHOSPHATE SERPL-MCNC: 5.7 MG/DL (ref 2.5–4.5)
PLATELET # BLD AUTO: 314 10E3/UL (ref 150–450)
POTASSIUM SERPL-SCNC: 4.5 MMOL/L (ref 3.4–5.3)
RBC # BLD AUTO: 2.65 10E6/UL (ref 4.4–5.9)
SODIUM SERPL-SCNC: 130 MMOL/L (ref 135–145)
WBC # BLD AUTO: 8.1 10E3/UL (ref 4–11)

## 2024-11-06 PROCEDURE — 250N000012 HC RX MED GY IP 250 OP 636 PS 637: Performed by: INTERNAL MEDICINE

## 2024-11-06 PROCEDURE — 82947 ASSAY GLUCOSE BLOOD QUANT: CPT | Performed by: INTERNAL MEDICINE

## 2024-11-06 PROCEDURE — 97530 THERAPEUTIC ACTIVITIES: CPT | Mod: GP

## 2024-11-06 PROCEDURE — 250N000013 HC RX MED GY IP 250 OP 250 PS 637: Performed by: INTERNAL MEDICINE

## 2024-11-06 PROCEDURE — 999N000111 HC STATISTIC OT IP EVAL DEFER: Performed by: OCCUPATIONAL THERAPIST

## 2024-11-06 PROCEDURE — 85027 COMPLETE CBC AUTOMATED: CPT | Performed by: INTERNAL MEDICINE

## 2024-11-06 PROCEDURE — 99232 SBSQ HOSP IP/OBS MODERATE 35: CPT

## 2024-11-06 PROCEDURE — 82040 ASSAY OF SERUM ALBUMIN: CPT | Performed by: INTERNAL MEDICINE

## 2024-11-06 PROCEDURE — 250N000013 HC RX MED GY IP 250 OP 250 PS 637: Performed by: PODIATRIST

## 2024-11-06 PROCEDURE — 99232 SBSQ HOSP IP/OBS MODERATE 35: CPT | Performed by: INTERNAL MEDICINE

## 2024-11-06 PROCEDURE — 120N000001 HC R&B MED SURG/OB

## 2024-11-06 PROCEDURE — 85610 PROTHROMBIN TIME: CPT | Performed by: INTERNAL MEDICINE

## 2024-11-06 PROCEDURE — 36415 COLL VENOUS BLD VENIPUNCTURE: CPT | Performed by: INTERNAL MEDICINE

## 2024-11-06 RX ORDER — WARFARIN SODIUM 2 MG/1
4 TABLET ORAL
Status: COMPLETED | OUTPATIENT
Start: 2024-11-06 | End: 2024-11-06

## 2024-11-06 RX ADMIN — SACUBITRIL AND VALSARTAN 1 TABLET: 49; 51 TABLET, FILM COATED ORAL at 20:39

## 2024-11-06 RX ADMIN — ALLOPURINOL 200 MG: 100 TABLET ORAL at 20:39

## 2024-11-06 RX ADMIN — AMOXICILLIN AND CLAVULANATE POTASSIUM 1 TABLET: 250; 125 TABLET, FILM COATED ORAL at 13:19

## 2024-11-06 RX ADMIN — WARFARIN SODIUM 4 MG: 2 TABLET ORAL at 17:48

## 2024-11-06 RX ADMIN — ATORVASTATIN CALCIUM 40 MG: 40 TABLET, FILM COATED ORAL at 22:43

## 2024-11-06 RX ADMIN — PANTOPRAZOLE SODIUM 40 MG: 40 TABLET, DELAYED RELEASE ORAL at 20:39

## 2024-11-06 RX ADMIN — PANTOPRAZOLE SODIUM 40 MG: 40 TABLET, DELAYED RELEASE ORAL at 10:58

## 2024-11-06 RX ADMIN — Medication 1 CAPSULE: at 10:58

## 2024-11-06 RX ADMIN — SACUBITRIL AND VALSARTAN 1 TABLET: 49; 51 TABLET, FILM COATED ORAL at 10:58

## 2024-11-06 RX ADMIN — INSULIN GLARGINE 33 UNITS: 100 INJECTION, SOLUTION SUBCUTANEOUS at 22:44

## 2024-11-06 RX ADMIN — TORSEMIDE 100 MG: 100 TABLET ORAL at 10:58

## 2024-11-06 RX ADMIN — CALCITRIOL CAPSULES 0.25 MCG 0.25 MCG: 0.25 CAPSULE ORAL at 22:43

## 2024-11-06 RX ADMIN — SEVELAMER CARBONATE 800 MG: 800 TABLET, FILM COATED ORAL at 17:48

## 2024-11-06 RX ADMIN — SEVELAMER CARBONATE 800 MG: 800 TABLET, FILM COATED ORAL at 10:58

## 2024-11-06 RX ADMIN — SEVELAMER CARBONATE 800 MG: 800 TABLET, FILM COATED ORAL at 13:19

## 2024-11-06 RX ADMIN — CLOPIDOGREL BISULFATE 75 MG: 75 TABLET ORAL at 10:58

## 2024-11-06 RX ADMIN — CINACALCET 60 MG: 30 TABLET ORAL at 10:58

## 2024-11-06 RX ADMIN — CARVEDILOL 6.25 MG: 6.25 TABLET, FILM COATED ORAL at 22:43

## 2024-11-06 NOTE — PROGRESS NOTES
Date & Time: 11/5/2024-11/6/24   Surgery/POD#: POD12 s/p fem bypass POD9 L great toe amputation  Behavior & Aggression: green  Fall Risk: yes  Orientation:A&O4  ABNL VS/O2:none  ABNL Labs: Cr+>7  Pain Management: reports minimal pain 1/10 declines intervention  Bowel/Bladder: continent, no BM today  Drains: none  Wounds/incisions L groin incision, incision to LLE, L great toe amputation CDI  Diet:Renal,  2 gram NA  Activity Level: up 1 GBW, not oob overnight  Tests/Procedures: none  Anticipated  DC Date: pending possibly Thursday   Significant Information: doppler pulses. Baseline numbness to BLE. PD running overnight with dressing changed.

## 2024-11-06 NOTE — PLAN OF CARE
OT: Order received, chart reviewed and discussed with care team. Per chart review, pt set to discharge to TCU tomorrow. PT is addressing functional mobility transfers inpatient. To avoid duplication of inpatient services, OT will defer to next level of care at this time. Defer discharge recommendations to PT. Will complete orders.

## 2024-11-06 NOTE — PROGRESS NOTES
[facility]     Renal Progress Note       SHORTHAND KEY FOR MY NOTES:  c = with, s = without, p = after, a = before, x = except, asx = asymptomatic, tx = transplant or treatment, sx = symptoms or symptomatic, cx = canceled or culture, rxn = reaction, yday = yesterday, nl = normal, abx = antibiotics, fxn = function, dx = diagnosis, dz = disease, m/h = melena/hematochezia, c/d/l/ha = cramping/dizziness/lightheadedness/headache, d/c = discharge or diarrhea/constipation, f/c/n/v = fevers/chills/nausea/vomiting, cp/sob = chest pain/shortness of breath, tbv = total body volume, rxn = reaction, tdc = tunneled dialysis catheter, pta = prior to admission, hd = hemodialysis, pd = peritoneal dialysis, hhd = home hemodialysis, edw = estimated dry wt         Assessment/Plan:     1.  ESKD.  Pt's sugars remain decently controlled and he is UF'ing better as a result.  A.  Continue same plan c all 2.5% bags.  B.  [facility] is training their staff for PD and his son will bring home supplies to the TCU.      2.  Severe anemia.  Hb is ok today.  No obv bleeding.  A.  Follow hb, clinically.  B.  Transfuse prn.  C.  Hb should be stable for a couple of days before discharge.    3.  Severe LLE PAD.  Pt is s/p angio.  His pain is decently controlled.  A.  Pain meds prn.  B.  Further plans per Vasc Surg.    4.  HTN.  Pt's BP is controlled c the current regimen.  A.  Continue same meds/doses for now.  B.  If the BP drops, decrease carvedilol first.    5.  DM2.  Pt's sugars are decently controlled c the current regimen.    A.  Continue glargine 35p.  B.  Continue high ISS.    6.  FEN.  Electrolytes are ok.    Case d/w Dr. Mixon.        Interval History:     Pt is doing ok and has no major issues.  Denies any bleeding.          Medications and Allergies:     Current Facility-Administered Medications   Medication Dose Route Frequency Provider Last Rate Last Admin    allopurinol (ZYLOPRIM) tablet 200 mg  200 mg Oral QPM  Haley Joseph DPM, Podiatry/Foot and Ankle Surgery   200 mg at 11/05/24 2050    amoxicillin-clavulanate (AUGMENTIN) 250-125 MG per tablet 1 tablet  1 tablet Oral Q24H formerly [facility] Ed Bonilla MD   1 tablet at 11/06/24 1319    atorvastatin (LIPITOR) tablet 40 mg  40 mg Oral At Bedtime Haley Joseph DPM, Podiatry/Foot and Ankle Surgery   40 mg at 11/05/24 2249    calcitRIOL (ROCALTROL) capsule 0.25 mcg  0.25 mcg Oral At Bedtime Haley Joseph DPM, Podiatry/Foot and Ankle Surgery   0.25 mcg at 11/05/24 2249    carvedilol (COREG) tablet 6.25 mg  6.25 mg Oral At Bedtime Haley Joseph DPM, Podiatry/Foot and Ankle Surgery   6.25 mg at 11/05/24 2249    cinacalcet (SENSIPAR) tablet 60 mg  60 mg Oral Once per day on Monday Wednesday Friday Haley Joseph DPM, Podiatry/Foot and Ankle Surgery   60 mg at 11/06/24 1058    clopidogrel (PLAVIX) tablet 75 mg  75 mg Oral Daily Chai España MD   75 mg at 11/06/24 1058    darbepoetin evaristo-polysorbate (ARANESP) injection 150 mcg  150 mcg Subcutaneous Q14 Days Woodrow Serna MD   150 mcg at 11/05/24 1832    insulin aspart (NovoLOG) injection (RAPID ACTING)  1-10 Units Subcutaneous TID AC Chai España MD   1 Units at 11/04/24 1724    insulin aspart (NovoLOG) injection (RAPID ACTING)  1-7 Units Subcutaneous At Bedtime Chai España MD   2 Units at 10/30/24 2133    insulin glargine (LANTUS PEN) injection 35 Units  35 Units Subcutaneous At Bedtime Ed Bonilla MD   35 Units at 11/05/24 2250    multivitamin RENAL (TRIPHROCAPS) capsule 1 capsule  1 capsule Oral Daily Ed Bonilla MD   1 capsule at 11/06/24 1058    pantoprazole (PROTONIX) EC tablet 40 mg  40 mg Oral BID Haley Joseph DPM, Podiatry/Foot and Ankle Surgery   40 mg at 11/06/24 1058    polyethylene glycol (MIRALAX) Packet 17 g  17 g Oral Daily Haley Joseph DPM, Podiatry/Foot and Ankle Surgery   17 g at 10/31/24 0843    sacubitril-valsartan (ENTRESTO) 49-51 MG per tablet 1  tablet  1 tablet Oral BID Haley Joseph DPM, Podiatry/Foot and Ankle Surgery   1 tablet at 11/06/24 1058    senna-docusate (SENOKOT-S/PERICOLACE) 8.6-50 MG per tablet 2 tablet  2 tablet Oral BID Sherron Gamble MD   2 tablet at 10/30/24 2131    sevelamer carbonate (RENVELA) tablet 800 mg  800 mg Oral TID w/meals Haley Joseph DPM, Podiatry/Foot and Ankle Surgery   800 mg at 11/06/24 1319    sodium chloride (PF) 0.9% PF flush 3 mL  3 mL Intracatheter Q8H Haley Joseph DPM, Podiatry/Foot and Ankle Surgery   3 mL at 11/05/24 0942    torsemide (DEMADEX) tablet 100 mg  100 mg Oral Daily Vega Guzman MD   100 mg at 11/06/24 1058    warfarin ANTICOAGULANT (COUMADIN) tablet 4 mg  4 mg Oral ONCE at 18:00 Chai España MD        Warfarin Dose Required Daily - Pharmacist Managed  1 each Oral See Admin Instructions Chai España MD         No Known Allergies       Physical Exam:     Vitals were reviewed     , Blood pressure (!) 134/95, pulse 99, temperature 97.8  F (36.6  C), temperature source Oral, resp. rate 17, weight 83.3 kg (183 lb 10.3 oz), SpO2 98%.  Wt Readings from Last 3 Encounters:   11/05/24 83.3 kg (183 lb 10.3 oz)   10/15/24 78.6 kg (173 lb 4.5 oz)   10/08/24 78.7 kg (173 lb 8 oz)     Intake/Output Summary (Last 24 hours) at 11/6/2024 1448  Last data filed at 11/6/2024 1100  Gross per 24 hour   Intake 840 ml   Output 610 ml   Net 230 ml     GENERAL APPEARANCE: pleasant, NAD, interactive, facial edema  RESP: CTA B c good efforts  CV: RRR c 2/6 m, nl S1/S2   ABDOMEN: s/nt/nd, + PD catheter  EXTREMITIES/SKIN: 1+ lle edema, no significant rle edema; LLE dressed - slightly tender to palpation     ml          Data:     CBC RESULTS:     Recent Labs   Lab 11/06/24  0639 11/05/24  0648 11/04/24  1048 11/03/24  0657 11/02/24  0648 11/01/24  0713 10/31/24  0711   WBC 8.1 9.1 10.1  --  11.0 14.1* 13.1*   RBC 2.65* 2.33* 2.08*  --  2.26* 2.22* 2.57*   HGB 8.6* 7.7* 6.9*  --  7.4* 7.3* 8.5*   HCT  25.5* 22.9* 20.7*  --  22.5* 21.1* 24.2*    330 331 330 340 362 317     Basic Metabolic Panel:  Recent Labs   Lab 11/06/24  1313 11/06/24  0828 11/06/24  0639 11/06/24  0154 11/05/24  2253 11/05/24  1807 11/05/24  1415 11/05/24  0648 11/04/24  1303 11/04/24  1048 11/03/24  0813 11/03/24  0657 11/02/24  0939 11/02/24  0648 11/01/24  0913 11/01/24  0713   NA  --   --  130*  --   --   --   --  132*  --  135  --  134*  --  132*  --  133*   POTASSIUM  --   --  4.5  --   --   --   --  4.8  --  4.3  --  4.0  --  4.0  --  4.2   CHLORIDE  --   --  93*  --   --   --   --  94*  --  94*  --  93*  --  92*  --  94*   CO2  --   --  25  --   --   --   --  25  --  28  --  26  --  24  --  26   BUN  --   --  52.9*  --   --   --   --  56.4*  --  51.9*  --  52.9*  --  54.9*  --  55.7*   CR  --   --  7.63*  --   --   --   --  7.85*  --  7.30*  --  7.41*  --  7.58*  --  7.64*   * 122* 193* 223* 210* 101*   < > 111*   < > 136*   < > 216*   < > 207*   < > 135*   TERENCE  --   --  9.4  --   --   --   --  8.9  --  9.6  --  9.5  --  9.4  --  9.8    < > = values in this interval not displayed.     INR  Recent Labs   Lab 11/06/24  0639 11/05/24  0648 11/04/24  0708 11/03/24  0657   INR 2.61* 3.20* 2.79* 2.46*      Attestation:   I have reviewed today's relevant vital signs, notes, medications, labs and imaging.    Vega Guzman MD  [facility] - Nephrology  [phone]

## 2024-11-06 NOTE — PROGRESS NOTES
VASCULAR SURGERY PROGRESS NOTE    Subjective:  NAEON. Denies any pain or discomfort. Walked 2x yesterday in the halls.     Objective:  Intake/Output Summary (Last 24 hours) at 11/5/2024 0821  Last data filed at 11/5/2024 0400  Gross per 24 hour   Intake 1779.67 ml   Output 650 ml   Net 1129.67 ml     PHYSICAL EXAM:  BP (!) 135/98 (BP Location: Right arm, Patient Position: Semi-Soto's)   Pulse 105   Temp 98.6  F (37  C) (Oral)   Resp 17   Wt 83.3 kg (183 lb 10.3 oz)   SpO2 97%   BMI 25.15 kg/m    Alert and oriented x4, neurologically intact  NLB on RA, soft abdomen, nondistended, nontender  Biphasic DP And PT signals  Lower extremity incisions intact  Left groin incision continues to improve, reapproximated with small openings, minimal erythema    ASSESSMENT:  64 year old male POD11 s/p fem-TP trunk bypass with takeback to OR for ligation of venous sidebranches. Doing well post-operatively. Groin incision healing.    PLAN:  -can transition to PO antibiotics  -plan for discharge to TCU in 1-2 days  -dressing change daily for groin with nursing, please call if any new concerns   -encourage ambulation    Milo Carrion MD  Vascular Surgery Resident

## 2024-11-06 NOTE — PLAN OF CARE
Date & Time: 11/6/2024   Surgery/POD#: POD12 s/p fem bypass POD8 L great toe amp   Behavior & Aggression: green  Fall Risk: yes  Orientation:A&O4  ABNL VS/O2:none  ABNL Labs: Cr+>7  Pain Management:denies  Bowel/Bladder: continent, BM today  Drains: none  Wounds/incisions L groin incision changed, CDI. L foot dressing clean, changed yesterday.   Diet:Renal  Activity Level: up 1 GBW  Tests/Procedures: none  Anticipated  DC Date: discharge Friday  Significant Information: none

## 2024-11-06 NOTE — PROGRESS NOTES
Cycler set up per protocol and per treatment orders     Results from previous treatment:  Effluent color and clarity: yellow and clear, no fibrin  Total UF: 576  Initial Drain: 56  Avg Dwell: 113  Lost Dwell: 46    Cycler Serial Number: [id number]  Total Treatment Volume: [id number]  Total treatment time: 9 hrs  Fill Volume: 2000 ml  Last Fill Volume:0ml  Heater ba.5% 6000mL;  Lot #: [id number];  Expiration Date:   Side Bag #1: 2.5% 6000mL;  Lot #: [id number];  Expiration Date:     Number of cycles including final fill: 5  Dwell Time: 132 minutes  Last Fill Volume: 0 ml  Initial Drain Alarm: 0 ml  PD orders reviewed with Patient procedure and ESRD teaching done and questions answered.  Cycler ready for hook-up.    Report given to: MARYSOL Johnson consent form signed yes Cycler set up per protocol and per treatment orders

## 2024-11-06 NOTE — PROGRESS NOTES
Care Management Follow Up    Length of Stay (days): 22    Expected Discharge Date: 11/07/2024     Concerns to be Addressed: discharge planning  wants PCP f/up scheduled and need to check if [facility] can change to smaller PD bags  Patient plan of care discussed at interdisciplinary rounds: Yes    Anticipated Discharge Disposition: Skilled Nursing Facility      Anticipated Discharge Services: Other (see comment) (Patient is planning to hire a private duty PCA)  Anticipated Discharge DME: None    Patient/family educated on Medicare website which has current facility and service quality ratings:    Education Provided on the Discharge Plan: Other (see comments) (ongoing)  Patient/Family in Agreement with the Plan: yes    Referrals Placed by CM/SW: Internal Clinic Care Coordination, Specialty Providers, Scheduled Follow-up appointments  Private pay costs discussed: Not applicable    Discussed  Partnership in Safe Discharge Planning  document with patient/family: Yes:     Handoff Completed: No, handoff not indicated or clinically appropriate    Additional Information:  Writer spoke with [facility]. They are having nurses training take place at 1430 Thursday and can accept patient after training is done. (Around 1600 or before 2000) Patient will need to bring his cyclier and his PD bag supplies with him to the facility. [facility] needs discharge orders by 1600 and patient needs to arrive prior to 2000.     Next Steps: PAS, Orders     ADD - patients son will drop off supplies to [facility] TCU today     ADD - [facility] can accept patient FRIDAY around 1300 equipment and training will be completed then    KATELYN Duarte

## 2024-11-06 NOTE — PROGRESS NOTES
[facility]    Medicine Progress Note - Hospitalist Service    Date of Admission:  10/15/2024    Assessment & Plan   Mr. Arnulfo Pritchett is a 64 yo male with history including DM2; HTN; CHF (EF in the 20's); CAD; PAD with prior revascularization; ESRD on PD; gout; HLD; TALI (intolerant to CPAP); and h/o RCC s/p right nephrectomy (2022). He presented to [facility] 10/15/2024 with worsening pain and redness in the left great toe with streaking up the leg. There was concern for limb ischemia and transferred to [facility] 10/16/2024 for management.     Underwent left lower extremity revascularization including bypass 10/25/2024. Underwent left great toe amputation 10/27/2024. Developed right groin wound infection requiring antibiotics and aggressive wound cares, with subsequently improvement. Overall, progressing and plan TCU.     # Critical limb ischemia of the left lower extremity.  # S/p left femoral endarterectomy with bovine pericardial patch angioplasty; left distal common femoral artery/proximal superficial femoral artery to tibioperoneal trunk bypass; and temporary closure of left groin wound with wound VAC 10/25/2024.  # S/p ligation of side branches of the left great saphenous vein and temporary closure of right groin with application of wound VAC 10/27/2024.  # Ischemic left great toe wound - s/p amputation of left great toe 10/27/2024.  # Right groin wound infection.  # H/o acute RLE arterial occlusion s/p SFA popliteal balloon angioplasty and stenting (5/2024).  * Initial presentation to OS 10/15 as above. On initial evaluation, pt was afebrile, hemodynamically stable. Labs notable for CBC with WBC normal, hgb 12.5; cr 9.84 (chronic PD); CRP 55.81; INR 4.42. Transferred to [facility] 10/16.  * Started on piperacillin-tazobactam on admit. Vascular surgery and podiatry consulted. Warfarin held on admit (in part for supratherapeutic INR).  * 10/16: MRI left foot  showed constellation of findings  favoring gangrene; no other osseous abnormalities to suggest osteomyelitis; degenerative changes.  * 10/17: IR left lower extremity angiogram 10/17 abandoned due to limited arterial access options. CTA showed significant bilateral PAD including left lower extremity focal calcified severe stenosis of the proximal superficial femoral artery, long segment occlusion of the mid to distal popliteal artery with low attenuation changes which may represent thrombus or soft atherosclerotic plaque, reconstitution of the origins of the posterior tibial arteries and severe tandem stenoses of the distal posterior tibial artery.  * 10/20: Started heparin gtt.  * 10/22: ID consulted. Piperacillin-tazobactam discontinued and changed to amoxicillin-clavulanate.  * 10/25: Underwent left lower extremity bypass as above.  * 10/27: Underwent left great toe amputation as above. Later noted a distinct loss of signals on a.m. rounds; duplex sonography showed flow reversal in the mid=graft with paucity of flow going beyond mid-graft. Taken urgently for surgery as above.  * 10/29: Warfarin restarted. Clopidogrel restarted (had been on ASA this hospitalization). Stopped amoxicillin-clavulanate.  * 11/1: Heparin gtt stopped. Restarted on piperacillin-tazobactam by vascular for possible left groin wound infection.  - Post-op management per vascular surgery.  - Discontinue piperacillin-tazobactam (re-started 11/1 by vascular) and start amoxicillin-clavulanate to stop after 11/11.  - Continue clopidogrel and atorvastatin.  - Continue warfarin.  - Continue PT and OT - plan TCU  - Pain management as noted below.  - Appreciate consultant help.      Pain control  * This hospitalization, noted that patient wanted low-dose pain meds for pain control.  * Ultimately started on low dose PRN opioids (see prior notes).  - Continue PRN oxycodone 2.5-5 mg q4h; PRN IV hydromorphone 0.1-0.2 mg q2h; minimize opioids as able.  - Continue bowel regimen to reduce  risk of constipation as noted.     Ischemic CM (HFrEF)  CAD with h/o stents x3 (last in 2021)  Hypertension (benign essential)  HLD.  [PTA BP meds: carvedilol 12.5 mg at bedtime; sacubitril-valsartan 49-51 mg BID; torsemide 100 mg qam.]  * Echo 6/2024 showed LVEF 20-25%, segmental wall motion globally hypokinetic.   * Seen by cardiology this hospitalization.  * Nuc stress test 10/22 was abnormal but no evidence of ischemia; medium sized area of infarction in the entire inferolateral segment(s) of the left ventricle extending into basal inferior segment; small area of nontransmural infarction in the apical segment(s) of the left ventricle; TID absent; left ventricular ejection fraction at stress 26%.  - Continue atorvastatin and clopidogrel.  - Continue carvedilol, sacubitril-valsartan and torsemide.  - Continue to monitor daily wts.  - Additional volume management via PD as noted.     Chronic paroxysmal afib s/p atrial ablation (6/2023)  - Continue carvedilol.  - Continue anticoagulation as above.     Acute blood loss anemia on chronic anemia  * Hgb 10.9 on admit and with gradual drop in hgb in setting of above surgeries.  * Ordered transfusion of 1U prbc's for hgb 6.9 on 11/4.           Recent Labs   Lab 11/05/24  0648 11/04/24  1048 11/02/24  0648 11/01/24  0713 10/31/24  0711 10/30/24  0721   HGB 7.7* 6.9* 7.4* 7.3* 8.5* 7.1*   - Continue to monitor CBC periodically as needed.  - Consider prbc transfusion if hgb </= 7.0 or if significant bleeding with hemodynamic instability or if symptomatic.     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  * PTA on nightly PD of which he has been tolerating. Access via RLQ double cuffed coiled PD catheter placed 4/16/2021.   * Nephrology consulted for PD.  * Treated with sodium zirconium cyclosilicate for hyperkalemia this hospitalization.  * K normalized 10/17.  - Continue nightly PD per nephrology.  - Continue calcitriol, cinacalcet  and sevelamer carbonate.  - Continue every other day gentamicin cream to PD cath site.  - Appreciate nephrology help - I d/w Dr. Guzman 11/4.     Hyperenhancing left lower pole renal nodule on CT  Hx of RCC s/p R nephrectomy (5/2022).  *  CTA 10/17 also showed a 4 mm hyperenhancing nodule in lower pole of the left kidney, noted  that this may represent a small primary renal cell carcinoma.  * Pt made aware of the above finding.  - Pt is aware of this finding and will follow-up closely with his urologist after discharge.     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  * Pt declined diabetic diet this hospitalization.  - Continue carb controlled diet (pt refuses diabetic diet).  - Continue glargine 35U at bedtime.  - Continue aspart ISS (high).     Constipation  * Noted that pt struggles with constipation at times.  - Continue scheduled polyethylene glycol and senna-docusate PRN senna-docusate; PRN bisacodyl suppository.     Gout  - Continue allopurinol.     GERD  - Continue PPI.     TALI  - Noted intolerant to CPAP.  - Continue to monitor clinically.           Diet: Combination Diet Renal Diet (dialysis); 2 gm NA Diet  Snacks/Supplements Adult: Nepro Oral Supplement; Between Meals    DVT Prophylaxis: Warfarin  Rosario Catheter: Not present  Lines: None     Cardiac Monitoring: None  Code Status: Full Code      Clinically Significant Risk Factors         # Hyponatremia: Lowest Na = 130 mmol/L in last 2 days, will monitor as appropriate  # Hypochloremia: Lowest Cl = 93 mmol/L in last 2 days, will monitor as appropriate   # Hypercalcemia: corrected calcium is >10.1, will monitor as appropriate    # Hypoalbuminemia: Lowest albumin = 2 g/dL at 11/6/2024  6:39 AM, will monitor as appropriate     # Hypertension: Noted on problem list            # Overweight: Estimated body mass index is 25.15 kg/m  as calculated from the following:    Height as of 10/8/24: 1.82 m (5'  "11.65\").    Weight as of this encounter: 83.3 kg (183 lb 10.3 oz).   # Moderate Malnutrition: based on nutrition assessment    # Financial/Environmental Concerns: none         Disposition Plan     Medically Ready for Discharge: Anticipated Tomorrow to TCU       Rafi Mixon MD  Hospitalist Service  [facility]  Securely message with [email or web address] (more info)  Text page via [employer] Paging/Directory   ______________________________________________________________________    Interval History   Nursing notes reviewed, no acute overnight events.  Patient denies any concerns today including significant pain, chest pain, shortness of breath.  Is ready to discharge tomorrow and will have his family bring his PD supplies he reports he has about a month's supply    Physical Exam   Vital Signs: Temp: 97.8  F (36.6  C) Temp src: Oral BP: (!) 134/95 Pulse: 99   Resp: 17 SpO2: 98 % O2 Device: None (Room air)    Weight: 183 lbs 10.29 oz    Constitutional: awake, alert, cooperative, NAD  HEENT: normocephalic, anicteric sclerae, MMM   Pulmonary: no increased work of breathing on room air, lungs clear to auscultation bilaterally without wheezes or rales   Cardiovascular: RRR, normal S1 and S2, no murmur appreciated   GI: BS+, soft, non-tender, non-distended, without guarding or rigidity  Skin: warm, dry, left groin wound per media no significant tenderness   MSK: no peripheral edema bilaterally  Neuro: AAOx3, no gross focal neurologic deficits noted      Medical Decision Making       40 MINUTES SPENT BY ME on the date of service doing chart review, history, exam, documentation & further activities per the note.      Data     I have personally reviewed the following data over the past 24 hrs:    8.1  \   8.6 (L)   / 314     130 (L) 93 (L) 52.9 (H) /  122 (H)   4.5 25 7.63 (H) \     ALT: N/A AST: N/A AP: N/A TBILI: N/A   ALB: 2.0 (L) TOT PROTEIN: N/A LIPASE: N/A     INR:  2.61 (H) PTT:  N/A   D-dimer:  N/A Fibrinogen:  " N/A       Imaging results reviewed over the past 24 hrs:   No results found for this or any previous visit (from the past 24 hours).

## 2024-11-07 PROBLEM — L08.9 LOCAL SKIN INFECTION: Status: ACTIVE | Noted: 2024-11-07

## 2024-11-07 LAB
ALBUMIN SERPL BCG-MCNC: 2 G/DL (ref 3.5–5.2)
ANION GAP SERPL CALCULATED.3IONS-SCNC: 18 MMOL/L (ref 7–15)
BUN SERPL-MCNC: 49.6 MG/DL (ref 8–23)
CALCIUM SERPL-MCNC: 9 MG/DL (ref 8.8–10.4)
CHLORIDE SERPL-SCNC: 93 MMOL/L (ref 98–107)
CREAT SERPL-MCNC: 7.44 MG/DL (ref 0.67–1.17)
EGFRCR SERPLBLD CKD-EPI 2021: 8 ML/MIN/1.73M2
ERYTHROCYTE [DISTWIDTH] IN BLOOD BY AUTOMATED COUNT: 18.2 % (ref 10–15)
GLUCOSE BLDC GLUCOMTR-MCNC: 129 MG/DL (ref 70–99)
GLUCOSE BLDC GLUCOMTR-MCNC: 143 MG/DL (ref 70–99)
GLUCOSE BLDC GLUCOMTR-MCNC: 148 MG/DL (ref 70–99)
GLUCOSE BLDC GLUCOMTR-MCNC: 180 MG/DL (ref 70–99)
GLUCOSE BLDC GLUCOMTR-MCNC: 207 MG/DL (ref 70–99)
GLUCOSE SERPL-MCNC: 204 MG/DL (ref 70–99)
HCO3 SERPL-SCNC: 21 MMOL/L (ref 22–29)
HCT VFR BLD AUTO: 24 % (ref 40–53)
HGB BLD-MCNC: 8 G/DL (ref 13.3–17.7)
INR PPP: 2.15 (ref 0.85–1.15)
MCH RBC QN AUTO: 32.8 PG (ref 26.5–33)
MCHC RBC AUTO-ENTMCNC: 33.3 G/DL (ref 31.5–36.5)
MCV RBC AUTO: 98 FL (ref 78–100)
PHOSPHATE SERPL-MCNC: 5.2 MG/DL (ref 2.5–4.5)
PLATELET # BLD AUTO: 307 10E3/UL (ref 150–450)
POTASSIUM SERPL-SCNC: 4.6 MMOL/L (ref 3.4–5.3)
RBC # BLD AUTO: 2.44 10E6/UL (ref 4.4–5.9)
SODIUM SERPL-SCNC: 132 MMOL/L (ref 135–145)
WBC # BLD AUTO: 8.8 10E3/UL (ref 4–11)

## 2024-11-07 PROCEDURE — 250N000013 HC RX MED GY IP 250 OP 250 PS 637: Performed by: INTERNAL MEDICINE

## 2024-11-07 PROCEDURE — 85610 PROTHROMBIN TIME: CPT | Performed by: INTERNAL MEDICINE

## 2024-11-07 PROCEDURE — 36415 COLL VENOUS BLD VENIPUNCTURE: CPT | Performed by: INTERNAL MEDICINE

## 2024-11-07 PROCEDURE — 120N000001 HC R&B MED SURG/OB

## 2024-11-07 PROCEDURE — 250N000013 HC RX MED GY IP 250 OP 250 PS 637: Performed by: PODIATRIST

## 2024-11-07 PROCEDURE — 99232 SBSQ HOSP IP/OBS MODERATE 35: CPT

## 2024-11-07 PROCEDURE — 99024 POSTOP FOLLOW-UP VISIT: CPT

## 2024-11-07 PROCEDURE — 99232 SBSQ HOSP IP/OBS MODERATE 35: CPT | Performed by: INTERNAL MEDICINE

## 2024-11-07 PROCEDURE — 85027 COMPLETE CBC AUTOMATED: CPT | Performed by: INTERNAL MEDICINE

## 2024-11-07 PROCEDURE — 82040 ASSAY OF SERUM ALBUMIN: CPT | Performed by: INTERNAL MEDICINE

## 2024-11-07 RX ORDER — WARFARIN SODIUM 5 MG/1
5 TABLET ORAL
Status: COMPLETED | OUTPATIENT
Start: 2024-11-07 | End: 2024-11-07

## 2024-11-07 RX ORDER — CARVEDILOL 6.25 MG/1
6.25 TABLET ORAL AT BEDTIME
DISCHARGE
Start: 2024-11-07

## 2024-11-07 RX ORDER — NICOTINE POLACRILEX 4 MG
15-30 LOZENGE BUCCAL
DISCHARGE
Start: 2024-11-07

## 2024-11-07 RX ORDER — OXYCODONE HYDROCHLORIDE 5 MG/1
2.5-5 TABLET ORAL EVERY 6 HOURS PRN
Status: SHIPPED | DISCHARGE
Start: 2024-11-07 | End: 2024-11-09

## 2024-11-07 RX ORDER — GENTAMICIN SULFATE 1 MG/G
CREAM TOPICAL DAILY PRN
Status: DISCONTINUED | OUTPATIENT
Start: 2024-11-07 | End: 2024-11-09 | Stop reason: HOSPADM

## 2024-11-07 RX ORDER — AMOXICILLIN AND CLAVULANATE POTASSIUM 250; 125 MG/1; MG/1
1 TABLET, FILM COATED ORAL EVERY 24 HOURS
DISCHARGE
Start: 2024-11-08 | End: 2024-11-12

## 2024-11-07 RX ADMIN — ALLOPURINOL 200 MG: 100 TABLET ORAL at 20:42

## 2024-11-07 RX ADMIN — TORSEMIDE 100 MG: 100 TABLET ORAL at 09:20

## 2024-11-07 RX ADMIN — SACUBITRIL AND VALSARTAN 1 TABLET: 49; 51 TABLET, FILM COATED ORAL at 09:20

## 2024-11-07 RX ADMIN — SACUBITRIL AND VALSARTAN 1 TABLET: 49; 51 TABLET, FILM COATED ORAL at 20:43

## 2024-11-07 RX ADMIN — CLOPIDOGREL BISULFATE 75 MG: 75 TABLET ORAL at 09:22

## 2024-11-07 RX ADMIN — CARVEDILOL 6.25 MG: 6.25 TABLET, FILM COATED ORAL at 20:45

## 2024-11-07 RX ADMIN — WARFARIN SODIUM 5 MG: 5 TABLET ORAL at 17:21

## 2024-11-07 RX ADMIN — SEVELAMER CARBONATE 800 MG: 800 TABLET, FILM COATED ORAL at 17:21

## 2024-11-07 RX ADMIN — AMOXICILLIN AND CLAVULANATE POTASSIUM 1 TABLET: 250; 125 TABLET, FILM COATED ORAL at 09:20

## 2024-11-07 RX ADMIN — SEVELAMER CARBONATE 800 MG: 800 TABLET, FILM COATED ORAL at 09:19

## 2024-11-07 RX ADMIN — SEVELAMER CARBONATE 800 MG: 800 TABLET, FILM COATED ORAL at 13:57

## 2024-11-07 RX ADMIN — POLYETHYLENE GLYCOL 3350 17 G: 17 POWDER, FOR SOLUTION ORAL at 09:22

## 2024-11-07 RX ADMIN — OXYCODONE HYDROCHLORIDE 5 MG: 5 TABLET ORAL at 20:41

## 2024-11-07 RX ADMIN — PANTOPRAZOLE SODIUM 40 MG: 40 TABLET, DELAYED RELEASE ORAL at 20:41

## 2024-11-07 RX ADMIN — CALCITRIOL CAPSULES 0.25 MCG 0.25 MCG: 0.25 CAPSULE ORAL at 20:42

## 2024-11-07 RX ADMIN — Medication 1 CAPSULE: at 09:19

## 2024-11-07 RX ADMIN — ATORVASTATIN CALCIUM 40 MG: 40 TABLET, FILM COATED ORAL at 20:42

## 2024-11-07 RX ADMIN — PANTOPRAZOLE SODIUM 40 MG: 40 TABLET, DELAYED RELEASE ORAL at 09:22

## 2024-11-07 NOTE — PROGRESS NOTES
Cycler set up per protocol and per treatment orders     Results from previous treatment:  Effluent color and clarity: Yellow and clear, no fibrin  Total UF: 665  Initial Drain: 22  Avg Dwell: 1:05  Lost Dwell: 1:29    Cycler Serial Number: [id number]  Total Treatment Volume: 89415 mL  Total treatment time: 9 hrs  Fill Volume: 2000 ml  Last Fill Volume:0 ml  Heater ba.5% 6000mL;  Lot #: [id number];  Expiration Date:   Side Bag #1: 1.5% 6000mL;  Lot #: [id number];  Expiration Date:     Number of cycles including final fill: 5  Dwell Time: 132 minutes  Last Fill Volume: 0 ml  Initial Drain Alarm: 0 ml  PD orders reviewed with Patient procedure and ESRD teaching done and questions answered.  Cycler ready for hook-up.    Report given to: Fabiana CHRISTIANSON RN

## 2024-11-07 NOTE — PROGRESS NOTES
VASCULAR SURGERY PROGRESS NOTE    Subjective:  Feeling well this am, no fevers or chills.     Objective:  Intake/Output Summary (Last 24 hours) at 11/7/2024 0852  Last data filed at 11/7/2024 0600  Gross per 24 hour   Intake 240 ml   Output 795 ml   Net -555 ml     PHYSICAL EXAM:  /87 (BP Location: Right arm)   Pulse 98   Temp 98.4  F (36.9  C) (Oral)   Resp 18   Wt 76.7 kg (169 lb 1.5 oz)   SpO2 100%   BMI 23.16 kg/m    Alert and oriented x4, neurologically intact  NLB on RA, soft abdomen, nondistended, nontender  Biphasic DP And PT signals  Lower extremity incisions intact  Left groin incision continues to improve, reapproximated with small openings, less erythema        ASSESSMENT:  64 year old male POD12 s/p fem-TP trunk bypass with takeback to OR for ligation of venous sidebranches. Doing well post-operatively. Groin incision healing.          PLAN:  -PO antibiotics  -discharge to TCU today or on 11/8  -dressing change daily to groin can be transitioned to nursing team, done this am, 11/7  -encourage ambulation    Discussed pt history, exam, assessment and plan with Dr. Gray of the vascular surgery service, who is in agreement with the above.    Cecilia Stearns NP  VASCULAR SURGERY

## 2024-11-07 NOTE — PROGRESS NOTES
Care Management Follow Up    Length of Stay (days): 23    Expected Discharge Date: 11/08/2024     Concerns to be Addressed: discharge planning  wants PCP f/up scheduled and need to check if [facility] can change to smaller PD bags  Patient plan of care discussed at interdisciplinary rounds: Yes    Anticipated Discharge Disposition: Skilled Nursing Facility              Anticipated Discharge Services: Other (see comment) (Patient is planning to hire a private duty PCA)  Anticipated Discharge DME: None    Patient/family educated on Medicare website which has current facility and service quality ratings:    Education Provided on the Discharge Plan: Other (see comments) (ongoing)  Patient/Family in Agreement with the Plan: yes    Referrals Placed by CM/SW: Internal Clinic Care Coordination, Specialty Providers, Scheduled Follow-up appointments  Private pay costs discussed: Not applicable    Discussed  Partnership in Safe Discharge Planning  document with patient/family: Yes:     Handoff Completed: No, handoff not indicated or clinically appropriate    Additional Information:  Writer received a call from Ervin patients son, Ervin concerned about the review for TCU facilities, writer explained the difficulty with locating a facility that accomodates patients needs. Writer sent Ervin resources and information about patient rights. Ervin asked writer to schedule transportation.     Writer arranged tentative transport using [employer], patient aware of costs, ride time is for between 1240 - 1320 Friday     Next Steps: KATELYN Eisenberg

## 2024-11-07 NOTE — PROGRESS NOTES
[facility]    Medicine Progress Note - Hospitalist Service    Date of Admission:  10/15/2024    Assessment & Plan   Mr. Arnulfo Pritchett is a 64 yo male with history including DM2; HTN; CHF (EF in the 20's); CAD; PAD with prior revascularization; ESRD on PD; gout; HLD; TALI (intolerant to CPAP); and h/o RCC s/p right nephrectomy (2022). He presented to [facility] 10/15/2024 with worsening pain and redness in the left great toe with streaking up the leg. There was concern for limb ischemia and transferred to [facility] 10/16/2024 for management.     Underwent left lower extremity revascularization including bypass 10/25/2024. Underwent left great toe amputation 10/27/2024. Developed right groin wound infection requiring antibiotics and aggressive wound cares, with subsequently improvement. Overall, progressing and plan TCU.     # Critical limb ischemia of the left lower extremity.  # S/p left femoral endarterectomy with bovine pericardial patch angioplasty; left distal common femoral artery/proximal superficial femoral artery to tibioperoneal trunk bypass; and temporary closure of left groin wound with wound VAC 10/25/2024.  # S/p ligation of side branches of the left great saphenous vein and temporary closure of right groin with application of wound VAC 10/27/2024.  # Ischemic left great toe wound - s/p amputation of left great toe 10/27/2024.  # Right groin wound infection.  # H/o acute RLE arterial occlusion s/p SFA popliteal balloon angioplasty and stenting (5/2024).  * Initial presentation to OS 10/15 as above. On initial evaluation, pt was afebrile, hemodynamically stable. Labs notable for CBC with WBC normal, hgb 12.5; cr 9.84 (chronic PD); CRP 55.81; INR 4.42. Transferred to [facility] 10/16.  * Started on piperacillin-tazobactam on admit. Vascular surgery and podiatry consulted. Warfarin held on admit (in part for supratherapeutic INR).  * 10/16: MRI left foot  showed constellation of findings  favoring gangrene; no other osseous abnormalities to suggest osteomyelitis; degenerative changes.  * 10/17: IR left lower extremity angiogram 10/17 abandoned due to limited arterial access options. CTA showed significant bilateral PAD including left lower extremity focal calcified severe stenosis of the proximal superficial femoral artery, long segment occlusion of the mid to distal popliteal artery with low attenuation changes which may represent thrombus or soft atherosclerotic plaque, reconstitution of the origins of the posterior tibial arteries and severe tandem stenoses of the distal posterior tibial artery.  * 10/20: Started heparin gtt.  * 10/22: ID consulted. Piperacillin-tazobactam discontinued and changed to amoxicillin-clavulanate.  * 10/25: Underwent left lower extremity bypass as above.  * 10/27: Underwent left great toe amputation as above. Later noted a distinct loss of signals on a.m. rounds; duplex sonography showed flow reversal in the mid=graft with paucity of flow going beyond mid-graft. Taken urgently for surgery as above.  * 10/29: Warfarin restarted. Clopidogrel restarted (had been on ASA this hospitalization). Stopped amoxicillin-clavulanate.  * 11/1: Heparin gtt stopped. Restarted on piperacillin-tazobactam by vascular for possible left groin wound infection.  - Post-op management per vascular surgery.  - Discontinue piperacillin-tazobactam (re-started 11/1 by vascular) and start amoxicillin-clavulanate to stop after 11/11.  - Continue clopidogrel and atorvastatin.  - Continue warfarin.  - Continue PT and OT - plan TCU  - Pain management as noted below.  - Appreciate consultant help.      Pain control  * This hospitalization, noted that patient wanted low-dose pain meds for pain control.  * Ultimately started on low dose PRN opioids (see prior notes).  - Continue PRN oxycodone 2.5-5 mg q4h; PRN IV hydromorphone 0.1-0.2 mg q2h; minimize opioids as able.  - Continue bowel regimen to reduce  risk of constipation as noted.     Ischemic CM (HFrEF)  CAD with h/o stents x3 (last in 2021)  Hypertension (benign essential)  HLD.  [PTA BP meds: carvedilol 12.5 mg at bedtime; sacubitril-valsartan 49-51 mg BID; torsemide 100 mg qam.]  * Echo 6/2024 showed LVEF 20-25%, segmental wall motion globally hypokinetic.   * Seen by cardiology this hospitalization.  * Nuc stress test 10/22 was abnormal but no evidence of ischemia; medium sized area of infarction in the entire inferolateral segment(s) of the left ventricle extending into basal inferior segment; small area of nontransmural infarction in the apical segment(s) of the left ventricle; TID absent; left ventricular ejection fraction at stress 26%.  - Continue atorvastatin and clopidogrel.  - Continue carvedilol, sacubitril-valsartan and torsemide.  - Continue to monitor daily wts.  - Additional volume management via PD as noted.     Chronic paroxysmal afib s/p atrial ablation (6/2023)  - Continue carvedilol.  - Continue anticoagulation as above.     Acute blood loss anemia on chronic anemia  * Hgb 10.9 on admit and with gradual drop in hgb in setting of above surgeries.  * Ordered transfusion of 1U prbc's for hgb 6.9 on 11/4.           Recent Labs   Lab 11/05/24  0648 11/04/24  1048 11/02/24  0648 11/01/24  0713 10/31/24  0711 10/30/24  0721   HGB 7.7* 6.9* 7.4* 7.3* 8.5* 7.1*   - Continue to monitor CBC periodically as needed.  - Consider prbc transfusion if hgb </= 7.0 or if significant bleeding with hemodynamic instability or if symptomatic.     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  * PTA on nightly PD of which he has been tolerating. Access via RLQ double cuffed coiled PD catheter placed 4/16/2021.   * Nephrology consulted for PD.  * Treated with sodium zirconium cyclosilicate for hyperkalemia this hospitalization.  * K normalized 10/17.  - Continue nightly PD per nephrology.  - Continue calcitriol, cinacalcet  and sevelamer carbonate.  - Continue every other day gentamicin cream to PD cath site.  - Appreciate nephrology help - I d/w Dr. Guzman 11/4.     Hyperenhancing left lower pole renal nodule on CT  Hx of RCC s/p R nephrectomy (5/2022).  *  CTA 10/17 also showed a 4 mm hyperenhancing nodule in lower pole of the left kidney, noted  that this may represent a small primary renal cell carcinoma.  * Pt made aware of the above finding.  - Pt is aware of this finding and will follow-up closely with his urologist after discharge.     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  * Pt declined diabetic diet this hospitalization.  Had BG in 70s on 11/5  - Continue carb controlled diet (pt refuses diabetic diet).  - Decrease glargine to 33U at bedtime with sliding scale insulin (plan to discharge on 35u at bedtime wihtotu sliding scale)   - Decrease to Medium sliding scale insulin      Constipation  * Noted that pt struggles with constipation at times.  - Continue scheduled polyethylene glycol and senna-docusate PRN senna-docusate; PRN bisacodyl suppository.     Gout  - Continue allopurinol.     GERD  - Continue PPI.     TALI  - Noted intolerant to CPAP.  - Continue to monitor clinically.           Diet: Combination Diet Renal Diet (dialysis); 2 gm NA Diet  Snacks/Supplements Adult: Nepro Oral Supplement; Between Meals  Diet    DVT Prophylaxis: Warfarin  Rosario Catheter: Not present  Lines: None     Cardiac Monitoring: None  Code Status: Full Code      Clinically Significant Risk Factors         # Hyponatremia: Lowest Na = 130 mmol/L in last 2 days, will monitor as appropriate  # Hypochloremia: Lowest Cl = 93 mmol/L in last 2 days, will monitor as appropriate   # Hypercalcemia: corrected calcium is >10.1, will monitor as appropriate    # Hypoalbuminemia: Lowest albumin = 2 g/dL at 11/7/2024  5:37 AM, will monitor as appropriate     # Hypertension: Noted on problem list              # Moderate Malnutrition: based on nutrition assessment      # Financial/Environmental Concerns: none         Disposition Plan     Medically Ready for Discharge: Ready Now with plans for TCU discharge 11/8       Rafi Mixon MD  Hospitalist Service  [facility]  Securely message with [employer] (more info)  Text page via [employer] Paging/Directory   ______________________________________________________________________    Interval History   Nursing notes reviewed, no acute overnight events.  Patient denies any concerns today including significant pain, chest pain, shortness of breath, left foot pain is stable, left groin feels okay, no fevers.  Patient is currently ready for discharge, PD supplies dropped off by his son at TCU yesterday    Physical Exam   Vital Signs: Temp: 98.4  F (36.9  C) Temp src: Oral BP: 119/87 Pulse: 98   Resp: 18 SpO2: 100 % O2 Device: None (Room air)    Weight: 169 lbs 1.49 oz    Constitutional: awake, alert, cooperative, NAD  HEENT: normocephalic, anicteric sclerae, MMM   Pulmonary: no increased work of breathing on room air, lungs clear to auscultation bilaterally without wheezes or rales   Cardiovascular: RRR, normal S1 and S2, no murmur appreciated   GI: BS+, soft, non-tender, non-distended, without guarding or rigidity  Skin: warm, dry, left groin wound with firmness on distal aspect, mildly tender, no purulent drainage or surrounding erythema  MSK: no peripheral edema bilaterally  Neuro: AAOx3, no gross focal neurologic deficits noted      Medical Decision Making       35 MINUTES SPENT BY ME on the date of service doing chart review, history, exam, documentation & further activities per the note.      Data     I have personally reviewed the following data over the past 24 hrs:    8.8  \   8.0 (L)   / 307     132 (L) 93 (L) 49.6 (H) /  148 (H)   4.6 21 (L) 7.44 (H) \     ALT: N/A AST: N/A AP: N/A TBILI: N/A   ALB: 2.0 (L) TOT PROTEIN: N/A LIPASE: N/A     INR:   2.15 (H) PTT:  N/A   D-dimer:  N/A Fibrinogen:  N/A       Imaging results reviewed over the past 24 hrs:   No results found for this or any previous visit (from the past 24 hours).

## 2024-11-07 NOTE — PROGRESS NOTES
Date & Time: 11/6/2024-11/7/24   Surgery/POD#: POD13 s/p fem bypass POD10 L great toe amputation  Behavior & Aggression: green  Fall Risk: yes  Orientation:A&O4  ABNL VS/O2:none  ABNL Labs: Cr+>7  Pain Management: reports minimal pain 1-2/10 declines intervention  Bowel/Bladder: continent, no BM today  Drains: none  Wounds/incisions L groin incision, incision to LLE, L great toe amputation CDI  Diet:Renal,  2 gram NA renal  Activity Level: up 1 GBW, not oob overnight  Tests/Procedures: none  Anticipated  DC Date: plans for discharge Friday  Significant Information: doppler pulses. Baseline numbness to BLE. PD running overnight.

## 2024-11-07 NOTE — PROGRESS NOTES
[facility]     Renal Progress Note       SHORTHAND KEY FOR MY NOTES:  c = with, s = without, p = after, a = before, x = except, asx = asymptomatic, tx = transplant or treatment, sx = symptoms or symptomatic, cx = canceled or culture, rxn = reaction, yday = yesterday, nl = normal, abx = antibiotics, fxn = function, dx = diagnosis, dz = disease, m/h = melena/hematochezia, c/d/l/ha = cramping/dizziness/lightheadedness/headache, d/c = discharge or diarrhea/constipation, f/c/n/v = fevers/chills/nausea/vomiting, cp/sob = chest pain/shortness of breath, tbv = total body volume, rxn = reaction, tdc = tunneled dialysis catheter, pta = prior to admission, hd = hemodialysis, pd = peritoneal dialysis, hhd = home hemodialysis, edw = estimated dry wt         Assessment/Plan:     1.  ESKD.  Pt is doing fine c PD.  He UF'd a bit more yday - > 650 ml.  He has swelling in his LLE but the RLE is fine.    A.  Continue same plan c all 2.5% bags.  B.  [facility] is training their staff for PD and his son will bring home supplies to the TCU.      2.  Severe anemia.  Hb is lower today despite UF'ing.  He has continued drops in his hb requiring blood transfusions so his hb should be stable prior to discharge.   A.  Follow hb daily.  B.  He should not be dc'd tmrw if his hb is < 8.    3.  Severe LLE PAD.  Pt is s/p angio.  His pain is decently controlled.  A.  Pain meds prn.  B.  Further plans per Vasc Surg.    4.  HTN.  Pt's BP is controlled c the current regimen.  A.  Continue same meds/doses for now.  B.  If the BP drops, decrease carvedilol first.    5.  DM2.  Pt's sugars are decently controlled c the current regimen.    A.  Continue glargine 35p.  B.  Continue high ISS.    6.  FEN.  Electrolytes are ok.    Case d/w Dr. Mixon.        Interval History:     Pt is feeling fine.  Good uo yday c ~1.1L recorded.  He is on tors.  He also UF'd > 650 ml.           Medications and Allergies:     Current  Facility-Administered Medications   Medication Dose Route Frequency Provider Last Rate Last Admin    allopurinol (ZYLOPRIM) tablet 200 mg  200 mg Oral QPM Haley Joseph DPM, Podiatry/Foot and Ankle Surgery   200 mg at 11/06/24 2039    amoxicillin-clavulanate (AUGMENTIN) 250-125 MG per tablet 1 tablet  1 tablet Oral Q24H [facility] Ed Bonilla MD   1 tablet at 11/06/24 1319    atorvastatin (LIPITOR) tablet 40 mg  40 mg Oral At Bedtime Haley Joseph DPM, Podiatry/Foot and Ankle Surgery   40 mg at 11/06/24 2243    calcitRIOL (ROCALTROL) capsule 0.25 mcg  0.25 mcg Oral At Bedtime Haley Joseph DPM, Podiatry/Foot and Ankle Surgery   0.25 mcg at 11/06/24 2243    carvedilol (COREG) tablet 6.25 mg  6.25 mg Oral At Bedtime Haley Joseph DPM, Podiatry/Foot and Ankle Surgery   6.25 mg at 11/06/24 2243    cinacalcet (SENSIPAR) tablet 60 mg  60 mg Oral Once per day on Monday Wednesday Friday Haley Joseph DPM, Podiatry/Foot and Ankle Surgery   60 mg at 11/06/24 1058    clopidogrel (PLAVIX) tablet 75 mg  75 mg Oral Daily Chai España MD   75 mg at 11/06/24 1058    darbepoetin evaristo-polysorbate (ARANESP) injection 150 mcg  150 mcg Subcutaneous Q14 Days Woodrow Serna MD   150 mcg at 11/05/24 1832    insulin aspart (NovoLOG) injection (RAPID ACTING)  1-10 Units Subcutaneous TID AC Chai España MD   1 Units at 11/04/24 1724    insulin aspart (NovoLOG) injection (RAPID ACTING)  1-7 Units Subcutaneous At Bedtime Chai España MD   2 Units at 11/06/24 2244    insulin glargine (LANTUS PEN) injection 35 Units  35 Units Subcutaneous At Bedtime Ed Bonilla MD   33 Units at 11/06/24 2244    multivitamin RENAL (TRIPHROCAPS) capsule 1 capsule  1 capsule Oral Daily Ed Bonilla MD   1 capsule at 11/06/24 1058    pantoprazole (PROTONIX) EC tablet 40 mg  40 mg Oral BID Haley Joseph DPM, Podiatry/Foot and Ankle Surgery   40 mg at 11/06/24 2039    polyethylene glycol (MIRALAX) Packet 17  g  17 g Oral Daily Haley Joseph DPM, Podiatry/Foot and Ankle Surgery   17 g at 10/31/24 0843    sacubitril-valsartan (ENTRESTO) 49-51 MG per tablet 1 tablet  1 tablet Oral BID Haley Joseph DPM, Podiatry/Foot and Ankle Surgery   1 tablet at 11/06/24 2039    senna-docusate (SENOKOT-S/PERICOLACE) 8.6-50 MG per tablet 2 tablet  2 tablet Oral BID Sherron Gamble MD   2 tablet at 10/30/24 2131    sevelamer carbonate (RENVELA) tablet 800 mg  800 mg Oral TID w/meals Haley Joseph DPM, Podiatry/Foot and Ankle Surgery   800 mg at 11/06/24 1748    sodium chloride (PF) 0.9% PF flush 3 mL  3 mL Intracatheter Q8H Haley Joseph DPM, Podiatry/Foot and Ankle Surgery   3 mL at 11/05/24 0942    torsemide (DEMADEX) tablet 100 mg  100 mg Oral Daily Vega Guzman MD   100 mg at 11/06/24 1058    Warfarin Dose Required Daily - Pharmacist Managed  1 each Oral See Admin Instructions Chai España MD         No Known Allergies       Physical Exam:     Vitals were reviewed     , Blood pressure (!) 129/90, pulse 109, temperature 98.5  F (36.9  C), temperature source Oral, resp. rate 18, weight 76.7 kg (169 lb 1.5 oz), SpO2 98%.  Wt Readings from Last 3 Encounters:   11/07/24 76.7 kg (169 lb 1.5 oz)   10/15/24 78.6 kg (173 lb 4.5 oz)   10/08/24 78.7 kg (173 lb 8 oz)     Intake/Output Summary (Last 24 hours) at 11/7/2024 0833  Last data filed at 11/7/2024 0600  Gross per 24 hour   Intake 240 ml   Output 795 ml   Net -555 ml     GENERAL APPEARANCE: pleasant, NAD, interactive, facial edema  RESP: CTA B c good efforts  CV: RRR c 2/6 m, nl S1/S2   ABDOMEN: s/nt/nd, + PD catheter  EXTREMITIES/SKIN: 1+ lle edema, no significant rle edema; LLE dressed - slightly tender to palpation    UF > 650 ml          Data:     CBC RESULTS:     Recent Labs   Lab 11/07/24  0537 11/06/24  0639 11/05/24  0648 11/04/24  1048 11/03/24  0657 11/02/24  0648 11/01/24  0713   WBC 8.8 8.1 9.1 10.1  --  11.0 14.1*   RBC 2.44* 2.65* 2.33* 2.08*  --  2.26*  2.22*   HGB 8.0* 8.6* 7.7* 6.9*  --  7.4* 7.3*   HCT 24.0* 25.5* 22.9* 20.7*  --  22.5* 21.1*    314 330 331 330 340 362     Basic Metabolic Panel:  Recent Labs   Lab 11/07/24  0744 11/07/24  0537 11/06/24  2222 11/06/24  1735 11/06/24  1313 11/06/24  0828 11/06/24  0639 11/05/24  1415 11/05/24  0648 11/04/24  1303 11/04/24  1048 11/03/24  0813 11/03/24  0657 11/02/24  0939 11/02/24  0648   NA  --  132*  --   --   --   --  130*  --  132*  --  135  --  134*  --  132*   POTASSIUM  --  4.6  --   --   --   --  4.5  --  4.8  --  4.3  --  4.0  --  4.0   CHLORIDE  --  93*  --   --   --   --  93*  --  94*  --  94*  --  93*  --  92*   CO2  --  21*  --   --   --   --  25  --  25  --  28  --  26  --  24   BUN  --  49.6*  --   --   --   --  52.9*  --  56.4*  --  51.9*  --  52.9*  --  54.9*   CR  --  7.44*  --   --   --   --  7.63*  --  7.85*  --  7.30*  --  7.41*  --  7.58*   * 204* 231* 101* 152* 122* 193*   < > 111*   < > 136*   < > 216*   < > 207*   TERENCE  --  9.0  --   --   --   --  9.4  --  8.9  --  9.6  --  9.5  --  9.4    < > = values in this interval not displayed.     INR  Recent Labs   Lab 11/07/24  0537 11/06/24  0639 11/05/24  0648 11/04/24  0708   INR 2.15* 2.61* 3.20* 2.79*      Attestation:   I have reviewed today's relevant vital signs, notes, medications, labs and imaging.    Vega Guzman MD  [facility] - Nephrology  [phone]

## 2024-11-07 NOTE — PLAN OF CARE
Goal Outcome Evaluation:       Alert and oriented. Vitals are with in normal limits. Denies pain. Declined activity out off bed. Also wanting to rest and have uninterrupted period. Dialysis machine is setup for the night time. Will need to connect to Peritoneal catheter in the evening. Discharge set up for tomorrow.

## 2024-11-08 ENCOUNTER — APPOINTMENT (OUTPATIENT)
Dept: PHYSICAL THERAPY | Facility: CLINIC | Age: 64
DRG: 252 | End: 2024-11-08
Attending: HOSPITALIST
Payer: MEDICARE

## 2024-11-08 LAB
ALBUMIN SERPL BCG-MCNC: 2 G/DL (ref 3.5–5.2)
ANION GAP SERPL CALCULATED.3IONS-SCNC: 13 MMOL/L (ref 7–15)
BUN SERPL-MCNC: 48.1 MG/DL (ref 8–23)
CALCIUM SERPL-MCNC: 9.1 MG/DL (ref 8.8–10.4)
CHLORIDE SERPL-SCNC: 95 MMOL/L (ref 98–107)
CREAT SERPL-MCNC: 7.26 MG/DL (ref 0.67–1.17)
EGFRCR SERPLBLD CKD-EPI 2021: 8 ML/MIN/1.73M2
ERYTHROCYTE [DISTWIDTH] IN BLOOD BY AUTOMATED COUNT: 17.9 % (ref 10–15)
GLUCOSE BLDC GLUCOMTR-MCNC: 145 MG/DL (ref 70–99)
GLUCOSE BLDC GLUCOMTR-MCNC: 150 MG/DL (ref 70–99)
GLUCOSE BLDC GLUCOMTR-MCNC: 157 MG/DL (ref 70–99)
GLUCOSE BLDC GLUCOMTR-MCNC: 230 MG/DL (ref 70–99)
GLUCOSE SERPL-MCNC: 192 MG/DL (ref 70–99)
HCO3 SERPL-SCNC: 25 MMOL/L (ref 22–29)
HCT VFR BLD AUTO: 22.9 % (ref 40–53)
HGB BLD-MCNC: 7.6 G/DL (ref 13.3–17.7)
INR PPP: 2.02 (ref 0.85–1.15)
MCH RBC QN AUTO: 32.9 PG (ref 26.5–33)
MCHC RBC AUTO-ENTMCNC: 33.2 G/DL (ref 31.5–36.5)
MCV RBC AUTO: 99 FL (ref 78–100)
PHOSPHATE SERPL-MCNC: 5.2 MG/DL (ref 2.5–4.5)
PLATELET # BLD AUTO: 301 10E3/UL (ref 150–450)
POTASSIUM SERPL-SCNC: 4.1 MMOL/L (ref 3.4–5.3)
RBC # BLD AUTO: 2.31 10E6/UL (ref 4.4–5.9)
SODIUM SERPL-SCNC: 133 MMOL/L (ref 135–145)
WBC # BLD AUTO: 8.8 10E3/UL (ref 4–11)

## 2024-11-08 PROCEDURE — 85610 PROTHROMBIN TIME: CPT | Performed by: INTERNAL MEDICINE

## 2024-11-08 PROCEDURE — 36415 COLL VENOUS BLD VENIPUNCTURE: CPT | Performed by: INTERNAL MEDICINE

## 2024-11-08 PROCEDURE — 250N000013 HC RX MED GY IP 250 OP 250 PS 637: Performed by: INTERNAL MEDICINE

## 2024-11-08 PROCEDURE — 250N000011 HC RX IP 250 OP 636: Performed by: PODIATRIST

## 2024-11-08 PROCEDURE — 250N000013 HC RX MED GY IP 250 OP 250 PS 637: Performed by: PODIATRIST

## 2024-11-08 PROCEDURE — 90945 DIALYSIS ONE EVALUATION: CPT

## 2024-11-08 PROCEDURE — 97116 GAIT TRAINING THERAPY: CPT | Mod: GP | Performed by: PHYSICAL THERAPIST

## 2024-11-08 PROCEDURE — 97530 THERAPEUTIC ACTIVITIES: CPT | Mod: GP | Performed by: PHYSICAL THERAPIST

## 2024-11-08 PROCEDURE — 120N000001 HC R&B MED SURG/OB

## 2024-11-08 PROCEDURE — 250N000013 HC RX MED GY IP 250 OP 250 PS 637

## 2024-11-08 PROCEDURE — 99232 SBSQ HOSP IP/OBS MODERATE 35: CPT | Performed by: STUDENT IN AN ORGANIZED HEALTH CARE EDUCATION/TRAINING PROGRAM

## 2024-11-08 PROCEDURE — 85014 HEMATOCRIT: CPT | Performed by: INTERNAL MEDICINE

## 2024-11-08 PROCEDURE — 82947 ASSAY GLUCOSE BLOOD QUANT: CPT | Performed by: INTERNAL MEDICINE

## 2024-11-08 PROCEDURE — 99232 SBSQ HOSP IP/OBS MODERATE 35: CPT

## 2024-11-08 PROCEDURE — 82310 ASSAY OF CALCIUM: CPT | Performed by: INTERNAL MEDICINE

## 2024-11-08 RX ORDER — GENTAMICIN SULFATE 1 MG/G
CREAM TOPICAL DAILY PRN
DISCHARGE
Start: 2024-11-08

## 2024-11-08 RX ORDER — GENTAMICIN SULFATE 1 MG/G
CREAM TOPICAL DAILY PRN
Status: DISCONTINUED | OUTPATIENT
Start: 2024-11-08 | End: 2024-11-08

## 2024-11-08 RX ADMIN — SENNOSIDES AND DOCUSATE SODIUM 2 TABLET: 50; 8.6 TABLET ORAL at 08:54

## 2024-11-08 RX ADMIN — SEVELAMER CARBONATE 800 MG: 800 TABLET, FILM COATED ORAL at 12:54

## 2024-11-08 RX ADMIN — ALLOPURINOL 200 MG: 100 TABLET ORAL at 21:01

## 2024-11-08 RX ADMIN — CARVEDILOL 6.25 MG: 6.25 TABLET, FILM COATED ORAL at 21:10

## 2024-11-08 RX ADMIN — SACUBITRIL AND VALSARTAN 1 TABLET: 49; 51 TABLET, FILM COATED ORAL at 08:55

## 2024-11-08 RX ADMIN — CLOPIDOGREL BISULFATE 75 MG: 75 TABLET ORAL at 08:55

## 2024-11-08 RX ADMIN — SEVELAMER CARBONATE 800 MG: 800 TABLET, FILM COATED ORAL at 17:28

## 2024-11-08 RX ADMIN — TORSEMIDE 100 MG: 100 TABLET ORAL at 08:56

## 2024-11-08 RX ADMIN — AMOXICILLIN AND CLAVULANATE POTASSIUM 1 TABLET: 250; 125 TABLET, FILM COATED ORAL at 08:56

## 2024-11-08 RX ADMIN — Medication 1 CAPSULE: at 08:56

## 2024-11-08 RX ADMIN — POLYETHYLENE GLYCOL 3350 17 G: 17 POWDER, FOR SOLUTION ORAL at 08:56

## 2024-11-08 RX ADMIN — SACUBITRIL AND VALSARTAN 1 TABLET: 49; 51 TABLET, FILM COATED ORAL at 21:01

## 2024-11-08 RX ADMIN — PANTOPRAZOLE SODIUM 40 MG: 40 TABLET, DELAYED RELEASE ORAL at 08:55

## 2024-11-08 RX ADMIN — OXYCODONE HYDROCHLORIDE 5 MG: 5 TABLET ORAL at 00:41

## 2024-11-08 RX ADMIN — OXYCODONE HYDROCHLORIDE 5 MG: 5 TABLET ORAL at 12:54

## 2024-11-08 RX ADMIN — SEVELAMER CARBONATE 800 MG: 800 TABLET, FILM COATED ORAL at 08:56

## 2024-11-08 RX ADMIN — ONDANSETRON 4 MG: 4 TABLET, ORALLY DISINTEGRATING ORAL at 17:34

## 2024-11-08 RX ADMIN — WARFARIN SODIUM 7.5 MG: 5 TABLET ORAL at 17:28

## 2024-11-08 RX ADMIN — CALCITRIOL CAPSULES 0.25 MCG 0.25 MCG: 0.25 CAPSULE ORAL at 21:11

## 2024-11-08 RX ADMIN — CINACALCET 60 MG: 30 TABLET ORAL at 08:55

## 2024-11-08 RX ADMIN — OXYCODONE HYDROCHLORIDE 5 MG: 5 TABLET ORAL at 18:48

## 2024-11-08 RX ADMIN — ATORVASTATIN CALCIUM 40 MG: 40 TABLET, FILM COATED ORAL at 21:10

## 2024-11-08 RX ADMIN — PANTOPRAZOLE SODIUM 40 MG: 40 TABLET, DELAYED RELEASE ORAL at 21:01

## 2024-11-08 NOTE — PLAN OF CARE
Goal Outcome Evaluation:      Plan of Care Reviewed With: patient             Shift: [id number]  10/25 LEFT FEMORAL ENDARTERECTOMY, LEFT FEMORAL TO TIBIAL PERONEAL TRUNK BYPASS     10/27 L great toe amp, LLE angiogram    Orientation: AOX4  Vital Signs: VSS, RA  Labs: Glucose 143   Pain Management: 1/10 pain in L great toe. Declines pain medication  Bowel: BS audible, passing gas. BM this AM.   Bladder: Voiding in urinal  Drains: Peritoneal dialysis  IV: Saline locked  Wounds/Incisions: L great toe dressing changed, L groin dressing changed as it was falling off. LLE dressing CDI.   Diet: 2 gm NA, renal  Activity Level: 1 assist, GB and walker. Not OOB.   Anticipated Discharge Date/Plan: Tentative discharge to TCU 11/7

## 2024-11-08 NOTE — PROGRESS NOTES
Cycler set up per protocol and per treatment orders   Note from Yesterday stated 1.5% bags but they were the 2.5% as ordered    Results from previous treatment:  Effluent color and clarity: Yellow and clear  Total UF: 194  Initial Drain: 49  Avg Dwell: 1:19  Lost Dwell: 0.18    Cycler Serial Number: [id number]  Total Treatment Volume: [id number]  Total treatment time: 9 hrs  Fill Volume: 2000ml  Last Fill Volume:0ml  Heater ba.5% 6000mL;  Lot #: [id number];  Expiration Date:   Side Bag #1: 2.5% 6000mL;  Lot #: [id number];  Expiration Date:     Number of cycles including final fill: 5  Dwell Time: 1:32 minutes  Last Fill Volume: 0ml  Initial Drain Alarm: 0ml  PD orders reviewed with Patient procedure and ESRD teaching done and questions answered.  Cycler ready for hook-up.    Report given to: Abi SYED

## 2024-11-08 NOTE — PROGRESS NOTES
VASCULAR SURGERY PROGRESS NOTE    Subjective:  NAEON. Hgb stable at 7.6 from 8.0. Patient likely leaving for TCU today. Encouraged patient to advocate for daily dressing changes while there.    Objective:  Intake/Output Summary (Last 24 hours) at 11/7/2024 0852  Last data filed at 11/7/2024 0600  Gross per 24 hour   Intake 240 ml   Output 795 ml   Net -555 ml     PHYSICAL EXAM:  /86 (BP Location: Right arm)   Pulse 98   Temp 98.5  F (36.9  C) (Oral)   Resp 18   Wt 77.4 kg (170 lb 10.2 oz)   SpO2 99%   BMI 23.37 kg/m    Alert and oriented x4, neurologically intact  NLB on RA, soft abdomen, nondistended, nontender  Biphasic DP signals  Lower extremity incisions intact  Left groin incision continues to improve, reapproximated with small openings, less erythema        ASSESSMENT:  64 year old male POD13 s/p fem-TP trunk bypass with takeback to OR for ligation of venous sidebranches. Doing well post-operatively. Groin incision healing.      PLAN:  -PO antibiotics  -discharge to TCU today   -dressing change daily to groin can be transitioned to nursing team. Instructions placed in discontinue orders for daily dressing changes  -encourage ambulation    Milo Carrion MD  VASCULAR SURGERY

## 2024-11-08 NOTE — PROGRESS NOTES
Renal Medicine Progress Note            Assessment/Plan:     Assessment:    ESRD on PD   5 cycles, 9h, 2L fill vol, 5 L total vol. At home also has last fill with extraneal (which we do not have in the hospital). Appears euvolemic today.   - PD tonight, all green bags     Severe anemia   Can discharge if Hgb stable tomorrow. No overt source of bleeding. Received aranesp 150 mcg on 11/5.     Critical limb ischemia LLE   Severe LLE PAD   Angio, endarterectomy, bypass completed this admission. Ischemic L great toe requiring amputation this admission. Vascular surgery managing.     HTN- well controlled     DM2- good control of sugars.     Reviewed notes from Dr. Mixon (hospitalist), Cecilia tSearns NP/Dr. Carrion (vascular surgery).     Woodrow Serna MD   [facility]  Office: [phone]          Interval History:      No acute events overnight   Hgb <8, discharge held due to drop   He feels ok   Denies SOB   Some alarms with PD but easy to bypass   194 ml UF overnight          Medications and Allergies:     Current Facility-Administered Medications   Medication Dose Route Frequency Provider Last Rate Last Admin    allopurinol (ZYLOPRIM) tablet 200 mg  200 mg Oral QPM Haley Joseph DPM, Podiatry/Foot and Ankle Surgery   200 mg at 11/07/24 2042    amoxicillin-clavulanate (AUGMENTIN) 250-125 MG per tablet 1 tablet  1 tablet Oral Q24H [facility] Ed Bonilla MD   1 tablet at 11/08/24 0856    atorvastatin (LIPITOR) tablet 40 mg  40 mg Oral At Bedtime Haley Joseph DPM, Podiatry/Foot and Ankle Surgery   40 mg at 11/07/24 2042    calcitRIOL (ROCALTROL) capsule 0.25 mcg  0.25 mcg Oral At Bedtime Haley Joseph DPM, Podiatry/Foot and Ankle Surgery   0.25 mcg at 11/07/24 2042    carvedilol (COREG) tablet 6.25 mg  6.25 mg Oral At Bedtime Haley Joseph DPM, Podiatry/Foot and Ankle Surgery   6.25 mg at 11/07/24 2045    cinacalcet (SENSIPAR) tablet 60 mg  60 mg Oral Once per day on Monday Wednesday Friday  Haley Joseph DPM, Podiatry/Foot and Ankle Surgery   60 mg at 11/08/24 0855    clopidogrel (PLAVIX) tablet 75 mg  75 mg Oral Daily Chai España MD   75 mg at 11/08/24 0855    darbepoetin evaristo-polysorbate (ARANESP) injection 150 mcg  150 mcg Subcutaneous Q14 Days Woodrow Serna MD   150 mcg at 11/05/24 1832    insulin aspart (NovoLOG) injection (RAPID ACTING)  1-7 Units Subcutaneous TID AC Rafi Mixon MD   1 Units at 11/08/24 1255    insulin aspart (NovoLOG) injection (RAPID ACTING)  1-5 Units Subcutaneous At Bedtime Rafi Mixon MD        insulin glargine (LANTUS PEN) injection 33 Units  33 Units Subcutaneous At Bedtime Rafi Mixon MD   33 Units at 11/07/24 2106    multivitamin RENAL (TRIPHROCAPS) capsule 1 capsule  1 capsule Oral Daily Ed Bonilla MD   1 capsule at 11/08/24 0856    pantoprazole (PROTONIX) EC tablet 40 mg  40 mg Oral BID Haley Joseph DPM, Podiatry/Foot and Ankle Surgery   40 mg at 11/08/24 0855    polyethylene glycol (MIRALAX) Packet 17 g  17 g Oral Daily Haley Joseph DPM, Podiatry/Foot and Ankle Surgery   17 g at 11/08/24 0856    sacubitril-valsartan (ENTRESTO) 49-51 MG per tablet 1 tablet  1 tablet Oral BID Haley Joseph DPM, Podiatry/Foot and Ankle Surgery   1 tablet at 11/08/24 0855    senna-docusate (SENOKOT-S/PERICOLACE) 8.6-50 MG per tablet 2 tablet  2 tablet Oral BID Sherron Gamble MD   2 tablet at 11/08/24 0854    sevelamer carbonate (RENVELA) tablet 800 mg  800 mg Oral TID w/meals Haley Joseph DPM, Podiatry/Foot and Ankle Surgery   800 mg at 11/08/24 1254    sodium chloride (PF) 0.9% PF flush 3 mL  3 mL Intracatheter Q8H Haley Joseph DPM, Podiatry/Foot and Ankle Surgery   3 mL at 11/08/24 0042    torsemide (DEMADEX) tablet 100 mg  100 mg Oral Daily Vega Guzman MD   100 mg at 11/08/24 0856    warfarin ANTICOAGULANT (COUMADIN) tablet 7.5 mg  7.5 mg Oral ONCE at 18:00 Chai España MD        Warfarin Dose Required Daily -  "Pharmacist Managed  1 each Oral See Admin Instructions Chai España MD          No Known Allergies         Physical Exam:   Vitals were reviewed  /86 (BP Location: Right arm)   Pulse 98   Temp 98.5  F (36.9  C) (Oral)   Resp 18   Wt 77.4 kg (170 lb 10.2 oz)   SpO2 99%   BMI 23.37 kg/m      Wt Readings from Last 3 Encounters:   11/08/24 77.4 kg (170 lb 10.2 oz)   10/15/24 78.6 kg (173 lb 4.5 oz)   10/08/24 78.7 kg (173 lb 8 oz)       Intake/Output Summary (Last 24 hours) at 11/8/2024 1440  Last data filed at 11/8/2024 1300  Gross per 24 hour   Intake 1083 ml   Output 1200 ml   Net -117 ml       GENERAL APPEARANCE: NAD   HEENT: normocephalic, MMM  RESP: clear anteriorly   CV: RRR, systolic murmur  ABDOMEN: soft, nondistended, nontender. PD catheter   EXTREMITIES/SKIN: no edema in RLE   NEURO:  alert, oriented, normal speech. LLE 1+ and wrapped in dressing          Data:     BMP  Recent Labs   Lab 11/08/24  1211 11/08/24  0652 11/08/24  0313 11/07/24  2105 11/07/24  0744 11/07/24  0537 11/06/24  0828 11/06/24  0639 11/05/24  1415 11/05/24  0648   NA  --  133*  --   --   --  132*  --  130*  --  132*   POTASSIUM  --  4.1  --   --   --  4.6  --  4.5  --  4.8   CHLORIDE  --  95*  --   --   --  93*  --  93*  --  94*   TERENCE  --  9.1  --   --   --  9.0  --  9.4  --  8.9   CO2  --  25  --   --   --  21*  --  25  --  25   BUN  --  48.1*  --   --   --  49.6*  --  52.9*  --  56.4*   CR  --  7.26*  --   --   --  7.44*  --  7.63*  --  7.85*   * 192* 230* 129*   < > 204*   < > 193*   < > 111*    < > = values in this interval not displayed.     CBC  Recent Labs   Lab 11/08/24  0652 11/07/24  0537 11/06/24  0639 11/05/24  0648   WBC 8.8 8.8 8.1 9.1   HGB 7.6* 8.0* 8.6* 7.7*   HCT 22.9* 24.0* 25.5* 22.9*   MCV 99 98 96 98    307 314 330     No results found for: \"AST\", \"ALT\", \"GGT\", \"ALKPHOS\", \"BILITOTAL\", \"BILICONJ\", \"BILIDIRECT\", \"SHELTON\"  Lab Results   Component Value Date    INR 2.02 (H) 11/08/2024 " "    No results found for: \"COLOR\", \"APPEARANCE\", \"URINEGLC\", \"URINEBILI\", \"URINEKETONE\", \"SG\", \"URINEPH\", \"PROTEIN\", \"UROBILINOGEN\", \"NITRITE\", \"LEUKEST\"      Attestation:  I have reviewed today's vital signs, notes, medications, labs and imaging.    Woodrow Serna MD  [facility] - Nephrology  Office: [phone]    "

## 2024-11-08 NOTE — PLAN OF CARE
Goal Outcome Evaluation:      Plan of Care Reviewed With: patient    Overall Patient Progress: improvingOverall Patient Progress: improving    Shift: 11p-7a  Surgery/POD#:   10/25: POD 14 from a left femoral endarterectomy, left fem-tib peroneal trunk bypass  10/27: POD 12 from a left great toe amputation, LLE angiogram  Behavior & Aggression: Green  Fall Risk: Yes  Orientation: A&O x4  ABNL VS/O2: VSS on RA, ex tachycardia (low 100s)  Tele: NA  ABNL Labs: Hgb 8, Na 132, Cr 7.44  Pain Management: PRN oxycodone x1  Bowel/Bladder: voiding in bedside urinal, bowel sounds normoactive, passing flatus, last BM 11/7  Drains: NA  Lines: PIV x3 SL'd, peritoneal dialysis catheter  Skin: L groin, L toe & LLE CDI  Diet: renal diet, 2g Na; denies N/V  Activity Level: x1 gb, walker; wears post-op shoe on left foot  Tests/Procedures: NA  Anticipated  DC Date: 11/8? Ride set up for 12:40-1:20am  Significant Information:   Vascular, Nephrology, SW & Hospitalist following.

## 2024-11-08 NOTE — PROGRESS NOTES
[facility]    Medicine Progress Note - Hospitalist Service    Date of Admission:  10/15/2024    Assessment & Plan   Mr. Arnulfo Pritchett is a 64 yo male with history including DM2; HTN; CHF (EF in the 20's); CAD; PAD with prior revascularization; ESRD on PD; gout; HLD; TALI (intolerant to CPAP); and h/o RCC s/p right nephrectomy (2022). He presented to [facility] 10/15/2024 with worsening pain and redness in the left great toe with streaking up the leg. There was concern for limb ischemia and transferred to [facility] 10/16/2024 for management.     Underwent left lower extremity revascularization including bypass 10/25/2024. Underwent left great toe amputation 10/27/2024. Developed right groin wound infection requiring antibiotics and aggressive wound cares, with subsequently improvement. Overall, progressing and plan TCU.     # Critical limb ischemia of the left lower extremity.  # S/p left femoral endarterectomy with bovine pericardial patch angioplasty; left distal common femoral artery/proximal superficial femoral artery to tibioperoneal trunk bypass; and temporary closure of left groin wound with wound VAC 10/25/2024.  # S/p ligation of side branches of the left great saphenous vein and temporary closure of right groin with application of wound VAC 10/27/2024.  # Ischemic left great toe wound - s/p amputation of left great toe 10/27/2024.  # Left groin wound infection.  # H/o acute RLE arterial occlusion s/p SFA popliteal balloon angioplasty and stenting (5/2024).  * Initial presentation to OS 10/15 as above. On initial evaluation, pt was afebrile, hemodynamically stable. Labs notable for CBC with WBC normal, hgb 12.5; cr 9.84 (chronic PD); CRP 55.81; INR 4.42. Transferred to [facility] 10/16.  * Started on piperacillin-tazobactam on admit. Vascular surgery and podiatry consulted. Warfarin held on admit (in part for supratherapeutic INR).  * 10/16: MRI left foot  showed constellation of findings  favoring gangrene; no other osseous abnormalities to suggest osteomyelitis; degenerative changes.  * 10/17: IR left lower extremity angiogram 10/17 abandoned due to limited arterial access options. CTA showed significant bilateral PAD including left lower extremity focal calcified severe stenosis of the proximal superficial femoral artery, long segment occlusion of the mid to distal popliteal artery with low attenuation changes which may represent thrombus or soft atherosclerotic plaque, reconstitution of the origins of the posterior tibial arteries and severe tandem stenoses of the distal posterior tibial artery.  * 10/20: Started heparin gtt.  * 10/22: ID consulted. Piperacillin-tazobactam discontinued and changed to amoxicillin-clavulanate.  * 10/25: Underwent left lower extremity bypass as above.  * 10/27: Underwent left great toe amputation as above. Later noted a distinct loss of signals on a.m. rounds; duplex sonography showed flow reversal in the mid=graft with paucity of flow going beyond mid-graft. Taken urgently for surgery as above.  * 10/29: Warfarin restarted. Clopidogrel restarted (had been on ASA this hospitalization). Stopped amoxicillin-clavulanate.  * 11/1: Heparin gtt stopped. Restarted on piperacillin-tazobactam by vascular for possible left groin wound infection.  - Post-op management per vascular surgery.  - Discontinue piperacillin-tazobactam (re-started 11/1 by vascular) and start amoxicillin-clavulanate to stop after 11/11.  - Continue clopidogrel and atorvastatin.  - Continue warfarin.  - Continue PT and OT - plan TCU  - Pain management as noted below.  - Appreciate consultant help.  - hemoglobin continues to trend down without signs of bleeding, nephrology recommends monitoring- if stable may be able to discharge tomorrow if facility accepts over the weekend       Pain control  * This hospitalization, noted that patient wanted low-dose pain meds for pain control.  * Ultimately started on  low dose PRN opioids (see prior notes).  - Continue PRN oxycodone 2.5-5 mg q4h; PRN IV hydromorphone 0.1-0.2 mg q2h; minimize opioids as able.  - Continue bowel regimen to reduce risk of constipation as noted.     Ischemic CM (HFrEF)  CAD with h/o stents x3 (last in 2021)  Hypertension (benign essential)  HLD.  [PTA BP meds: carvedilol 12.5 mg at bedtime; sacubitril-valsartan 49-51 mg BID; torsemide 100 mg qam.]  * Echo 6/2024 showed LVEF 20-25%, segmental wall motion globally hypokinetic.   * Seen by cardiology this hospitalization.  * Nuc stress test 10/22 was abnormal but no evidence of ischemia; medium sized area of infarction in the entire inferolateral segment(s) of the left ventricle extending into basal inferior segment; small area of nontransmural infarction in the apical segment(s) of the left ventricle; TID absent; left ventricular ejection fraction at stress 26%.  - Continue atorvastatin and clopidogrel.  - Continue carvedilol, sacubitril-valsartan and torsemide.  - Continue to monitor daily wts.  - Additional volume management via PD as noted.     Chronic paroxysmal afib s/p atrial ablation (6/2023)  - Continue carvedilol.  - Continue anticoagulation as above.     Acute blood loss anemia on chronic anemia  * Hgb 10.9 on admit and with gradual drop in hgb in setting of above surgeries.  * Ordered transfusion of 1U prbc's for hgb 6.9 on 11/4.           Recent Labs   Lab 11/05/24  0648 11/04/24  1048 11/02/24  0648 11/01/24  0713 10/31/24  0711 10/30/24  0721   HGB 7.7* 6.9* 7.4* 7.3* 8.5* 7.1*   - Continue to monitor CBC periodically as needed.  - Consider prbc transfusion if hgb </= 7.0 or if significant bleeding with hemodynamic instability or if symptomatic.  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  * PTA on  nightly PD of which he has been tolerating. Access via RLQ double cuffed coiled PD catheter placed 4/16/2021.   * Nephrology consulted for PD.  * Treated with sodium zirconium cyclosilicate for hyperkalemia this hospitalization.  * K normalized 10/17.  - Continue nightly PD per nephrology.  - Continue calcitriol, cinacalcet and sevelamer carbonate.  - Continue every other day gentamicin cream to PD cath site.  - Appreciate nephrology help - I d/w Dr. Guzman 11/4.     Hyperenhancing left lower pole renal nodule on CT  Hx of RCC s/p R nephrectomy (5/2022).  *  CTA 10/17 also showed a 4 mm hyperenhancing nodule in lower pole of the left kidney, noted  that this may represent a small primary renal cell carcinoma.  * Pt made aware of the above finding.  - Pt is aware of this finding and will follow-up closely with his urologist after discharge.     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  * Pt declined diabetic diet this hospitalization.  Had BG in 70s on 11/5  - Continue carb controlled diet (pt refuses diabetic diet).  - Decrease glargine to 33U at bedtime with sliding scale insulin (plan to discharge on 35u at bedtime wihtotu sliding scale)   - Decrease to Medium sliding scale insulin      Constipation  * Noted that pt struggles with constipation at times.  - Continue scheduled polyethylene glycol and senna-docusate PRN senna-docusate; PRN bisacodyl suppository.     Gout  - Continue allopurinol.     GERD  - Continue PPI.     TALI  - Noted intolerant to CPAP.  - Continue to monitor clinically.           Diet: Combination Diet Renal Diet (dialysis); 2 gm NA Diet  Snacks/Supplements Adult: Nepro Oral Supplement; Between Meals  Diet    DVT Prophylaxis: Warfarin  Rosario Catheter: Not present  Lines: None     Cardiac Monitoring: None  Code Status: Full Code      Clinically Significant Risk Factors         # Hyponatremia: Lowest Na = 132 mmol/L in last 2 days, will  monitor as appropriate  # Hypochloremia: Lowest Cl = 93 mmol/L in last 2 days, will monitor as appropriate   # Hypercalcemia: corrected calcium is >10.1, will monitor as appropriate    # Hypoalbuminemia: Lowest albumin = 2 g/dL at 11/8/2024  6:52 AM, will monitor as appropriate     # Hypertension: Noted on problem list             # Moderate Malnutrition: based on nutrition assessment      # Financial/Environmental Concerns: none         Disposition Plan     Medically Ready for Discharge: Anticipated Today with plans for TCU, pending hemoglobin stability       Rafi Mixon MD  Hospitalist Service  [facility]  Securely message with sones (more info)  Text page via [employer] Paging/Directory   ______________________________________________________________________    Interval History   Nursing notes reviewed, no acute overnight events.  Patient denies any concerns today including significant pain, chest pain, shortness of breath, left foot pain is stable, left groin feels okay, no fevers.  PD supplies dropped off by his son at TCU yesterday    Physical Exam   Vital Signs: Temp: 98.5  F (36.9  C) Temp src: Oral BP: 127/86 Pulse: 98   Resp: 18 SpO2: 99 % O2 Device: None (Room air)    Weight: 170 lbs 10.18 oz    Constitutional: awake, alert, cooperative, NAD  HEENT: normocephalic, anicteric sclerae, MMM   Pulmonary: no increased work of breathing on room air, lungs clear to auscultation bilaterally without wheezes or rales   Cardiovascular: RRR, normal S1 and S2, no murmur appreciated   GI: BS+, soft, non-tender, non-distended, without guarding or rigidity  Skin: warm, dry, left groin wound with firmness on distal aspect, mildly tender, no purulent drainage or surrounding erythema, LLE wounds not bleeding  MSK: no peripheral edema bilaterally  Neuro: AAOx3, no gross focal neurologic deficits noted      Medical Decision Making       30 MINUTES SPENT BY ME on the date of service doing chart review,  history, exam, documentation & further activities per the note.      Data     I have personally reviewed the following data over the past 24 hrs:    8.8  \   7.6 (L)   / 301     133 (L) 95 (L) 48.1 (H) /  192 (H)   4.1 25 7.26 (H) \     ALT: N/A AST: N/A AP: N/A TBILI: N/A   ALB: 2.0 (L) TOT PROTEIN: N/A LIPASE: N/A     INR:  2.02 (H) PTT:  N/A   D-dimer:  N/A Fibrinogen:  N/A       Imaging results reviewed over the past 24 hrs:   No results found for this or any previous visit (from the past 24 hours).

## 2024-11-08 NOTE — PLAN OF CARE
Goal Outcome Evaluation:      Plan of Care Reviewed With: patient    Date & Time: 11/8/24 1700  Surgery/POD#: 10/25 femoral endarterectomy 10/27 L toe amputation LLE angiogram  Behavior & Aggression: calm cooperative   Fall Risk: yes  Orientation:alert and oriented   ABNL VS/O2:vss, lungs diminished, O2 sats mid 90's on room air   ABNL Labs: HGB 7.6  Pain Management:oxycodone as needed   Bowel/Bladder: peritoneal dialysis pt, urine output charted, passing flatus, no BM this shift    Drains: peritoneal dialysis port  Wounds/incisions L groin dressing changed this shift, L foot dressing changed yesterday, order for changes every other day   Diet:2gm sodium diet   Activity Level: up with one with gait belt and walker   Tests/Procedures:   Anticipated  DC Date:   Significant Information: pt does peritoneal dialysis overnights, equipment set up in room by dialysis nurse, pt will hook himself up,

## 2024-11-08 NOTE — PROGRESS NOTES
Care Management Follow Up    Length of Stay (days): 24    Expected Discharge Date: 11/08/2024     Concerns to be Addressed: discharge planning  wants PCP f/up scheduled and need to check if [facility] can change to smaller PD bags  Patient plan of care discussed at interdisciplinary rounds: Yes    Anticipated Discharge Disposition: Skilled Nursing Facility        Anticipated Discharge Services: Other (see comment) (Patient is planning to hire a private duty PCA)  Anticipated Discharge DME: None    Patient/family educated on Medicare website which has current facility and service quality ratings:    Education Provided on the Discharge Plan: Other (see comments) (ongoing)  Patient/Family in Agreement with the Plan: yes    Referrals Placed by CM/SW: Internal Clinic Care Coordination, Specialty Providers, Scheduled Follow-up appointments  Private pay costs discussed: Not applicable    Discussed  Partnership in Safe Discharge Planning  document with patient/family: Yes:     Handoff Completed: No, handoff not indicated or clinically appropriate    Additional Information:  Writer was told patient not able to discharge today due to low hemoglobin. Writer communicated with [facility] TCU   They do have weekend admissions and are able to accept patient tomorrow - SAT - or SUN   Writer rescheduled transportation to [facility] - [address]     Next Steps: orders - scripts     KATELYN Duarte

## 2024-11-09 VITALS
TEMPERATURE: 98.3 F | WEIGHT: 168.65 LBS | OXYGEN SATURATION: 100 % | DIASTOLIC BLOOD PRESSURE: 74 MMHG | RESPIRATION RATE: 18 BRPM | SYSTOLIC BLOOD PRESSURE: 101 MMHG | BODY MASS INDEX: 23.09 KG/M2 | HEART RATE: 100 BPM

## 2024-11-09 LAB
ALBUMIN SERPL BCG-MCNC: 2.1 G/DL (ref 3.5–5.2)
ANION GAP SERPL CALCULATED.3IONS-SCNC: 14 MMOL/L (ref 7–15)
BUN SERPL-MCNC: 43.8 MG/DL (ref 8–23)
CALCIUM SERPL-MCNC: 8.9 MG/DL (ref 8.8–10.4)
CHLORIDE SERPL-SCNC: 95 MMOL/L (ref 98–107)
CREAT SERPL-MCNC: 7.51 MG/DL (ref 0.67–1.17)
EGFRCR SERPLBLD CKD-EPI 2021: 7 ML/MIN/1.73M2
ERYTHROCYTE [DISTWIDTH] IN BLOOD BY AUTOMATED COUNT: 18.1 % (ref 10–15)
GLUCOSE BLDC GLUCOMTR-MCNC: 111 MG/DL (ref 70–99)
GLUCOSE BLDC GLUCOMTR-MCNC: 132 MG/DL (ref 70–99)
GLUCOSE SERPL-MCNC: 144 MG/DL (ref 70–99)
HCO3 SERPL-SCNC: 24 MMOL/L (ref 22–29)
HCT VFR BLD AUTO: 25.3 % (ref 40–53)
HGB BLD-MCNC: 8.3 G/DL (ref 13.3–17.7)
INR PPP: 2.31 (ref 0.85–1.15)
MCH RBC QN AUTO: 32.9 PG (ref 26.5–33)
MCHC RBC AUTO-ENTMCNC: 32.8 G/DL (ref 31.5–36.5)
MCV RBC AUTO: 100 FL (ref 78–100)
PHOSPHATE SERPL-MCNC: 5.3 MG/DL (ref 2.5–4.5)
PLATELET # BLD AUTO: 309 10E3/UL (ref 150–450)
POTASSIUM SERPL-SCNC: 4.2 MMOL/L (ref 3.4–5.3)
RBC # BLD AUTO: 2.52 10E6/UL (ref 4.4–5.9)
SODIUM SERPL-SCNC: 133 MMOL/L (ref 135–145)
WBC # BLD AUTO: 8.9 10E3/UL (ref 4–11)

## 2024-11-09 PROCEDURE — 250N000013 HC RX MED GY IP 250 OP 250 PS 637: Performed by: INTERNAL MEDICINE

## 2024-11-09 PROCEDURE — 36415 COLL VENOUS BLD VENIPUNCTURE: CPT | Performed by: INTERNAL MEDICINE

## 2024-11-09 PROCEDURE — 250N000013 HC RX MED GY IP 250 OP 250 PS 637: Performed by: PODIATRIST

## 2024-11-09 PROCEDURE — 85027 COMPLETE CBC AUTOMATED: CPT | Performed by: INTERNAL MEDICINE

## 2024-11-09 PROCEDURE — 80069 RENAL FUNCTION PANEL: CPT | Performed by: INTERNAL MEDICINE

## 2024-11-09 PROCEDURE — 250N000011 HC RX IP 250 OP 636: Performed by: PODIATRIST

## 2024-11-09 PROCEDURE — 99239 HOSP IP/OBS DSCHRG MGMT >30: CPT

## 2024-11-09 PROCEDURE — 85610 PROTHROMBIN TIME: CPT | Performed by: INTERNAL MEDICINE

## 2024-11-09 PROCEDURE — 250N000013 HC RX MED GY IP 250 OP 250 PS 637

## 2024-11-09 RX ORDER — OXYCODONE HYDROCHLORIDE 5 MG/1
5 TABLET ORAL EVERY 6 HOURS PRN
Qty: 20 TABLET | Refills: 0 | Status: SHIPPED | OUTPATIENT
Start: 2024-11-09

## 2024-11-09 RX ADMIN — AMOXICILLIN AND CLAVULANATE POTASSIUM 1 TABLET: 250; 125 TABLET, FILM COATED ORAL at 08:57

## 2024-11-09 RX ADMIN — SENNOSIDES AND DOCUSATE SODIUM 2 TABLET: 50; 8.6 TABLET ORAL at 08:56

## 2024-11-09 RX ADMIN — POLYETHYLENE GLYCOL 3350 17 G: 17 POWDER, FOR SOLUTION ORAL at 08:54

## 2024-11-09 RX ADMIN — SEVELAMER CARBONATE 800 MG: 800 TABLET, FILM COATED ORAL at 08:57

## 2024-11-09 RX ADMIN — CLOPIDOGREL BISULFATE 75 MG: 75 TABLET ORAL at 08:56

## 2024-11-09 RX ADMIN — PANTOPRAZOLE SODIUM 40 MG: 40 TABLET, DELAYED RELEASE ORAL at 08:56

## 2024-11-09 RX ADMIN — Medication 1 CAPSULE: at 08:57

## 2024-11-09 RX ADMIN — SACUBITRIL AND VALSARTAN 1 TABLET: 49; 51 TABLET, FILM COATED ORAL at 08:57

## 2024-11-09 RX ADMIN — ONDANSETRON 4 MG: 2 INJECTION INTRAMUSCULAR; INTRAVENOUS at 09:00

## 2024-11-09 RX ADMIN — OXYCODONE HYDROCHLORIDE 5 MG: 5 TABLET ORAL at 11:29

## 2024-11-09 RX ADMIN — SEVELAMER CARBONATE 800 MG: 800 TABLET, FILM COATED ORAL at 12:30

## 2024-11-09 RX ADMIN — TORSEMIDE 100 MG: 100 TABLET ORAL at 08:56

## 2024-11-09 RX ADMIN — OXYCODONE HYDROCHLORIDE 5 MG: 5 TABLET ORAL at 00:27

## 2024-11-09 ASSESSMENT — ACTIVITIES OF DAILY LIVING (ADL)
ADLS_ACUITY_SCORE: 0

## 2024-11-09 NOTE — PLAN OF CARE
VSS, A/O, ambulates assist 1 w/GB and cane/walker.  Pt groin dressing changed and LLE great toe dressing changed per plan of care.  Pt peritoneal dialysis catheter dressing will be changed this evening per patient preference.  Pt will be discharged to [facility] via wheelchair transport.  Pt states that peritoneal dialysis supplies and cycler have been brought to rehab facility by his son.  Oxycodone given x1 for foot pain associated with dressing change.

## 2024-11-09 NOTE — PLAN OF CARE
Goal Outcome Evaluation:  Surgery/POD#:  10/25: POD 14 from a left femoral endarterectomy, left fem-tib peroneal trunk bypass  10/27: POD 12 from a left great toe amputation, LLE angiogram  Behavior & Aggression: Green  Fall Risk: Yes  Orientation: A&O x4  ABNL VS/O2: VSS on RA, x tachycardia  Tele: NA  ABNL Labs: Hgb 7.6, Na 133, Cr 7.26  Pain Management: PRN oxycodone, can have at 2300  Bowel/Bladder: voiding in bedside urinal, PD 1/5 filling, bowel sounds normoactive, passing flatus, last BM 11/7  Drains: NA  Lines: PIV x3 SL'd, peritoneal dialysis catheter running  Skin: L groin, L toe & LLE CDI  Diet: renal diet, 2g Na; denies N/V  Activity Level: x1 gb, walker; wears post-op shoe on left foot  Tests/Procedures: NA  Anticipated  DC Date: 11/8? Ride set up for 12:40-1:20am  Significant Information:   Vascular, Nephrology, SW & Hospitalist following.     11/8/[phone]

## 2024-11-09 NOTE — PROGRESS NOTES
[place] [facility]    Doing very well this morning.  Nightly PD catheter working well.    No left leg discomfort.    Left groin incision slowly improving no significant cellulitis.  Staples intact to calf and thigh  Good Doppler signals in left graft.     Impression: Doing very well from vascular standpoint.  Awaiting TCU placement.  Follow-up with us already scheduled.      Patrick Hein MD

## 2024-11-09 NOTE — DISCHARGE SUMMARY
[facility]  Hospitalist Discharge Summary      Date of Admission:  10/15/2024  Date of Discharge:  11/9/2024  1:03 PM  Discharging Provider: Rafi Mixon MD  Discharge Service: Hospitalist Service    Discharge Diagnoses   Critical limb ischemia of the left lower extremity  --S/p left femoral endarterectomy with bovine pericardial patch angioplasty; left distal common femoral artery/proximal superficial femoral artery to tibioperoneal trunk bypass; and temporary closure of left groin wound with wound VAC 10/25/2024  --S/p ligation of side branches of the left great saphenous vein and temporary closure of right groin with application of wound VAC 10/27/2024  Ischemic left great toe wound - s/p amputation of left great toe 10/27/2024  Left groin wound infection   H/o acute RLE arterial occlusion s/p SFA popliteal balloon angioplasty and stenting (5/2024)  Ischemic CM (HFrEF)  CAD with h/o stents x3 (last in 2021)  Hypertension (benign essential)  HLD  Chronic paroxysmal afib s/p atrial ablation (6/2023)  Acute blood loss anemia on chronic anemia  ESRD 2/2 DM nephropathy on PD  Secondary hyperparathyroidism  Hyperkalemia, resolved.  Hyperenhancing left lower pole renal nodule on CT  Hx of RCC s/p R nephrectomy (5/2022).  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 Joseph in 2-3 weeks from discharge.   For appointments, questions or concerns: call: [phone]  Office fax number:  [phone]     Follow up with intermediate physician.  The following labs/tests are recommended: weekly labs with PD.        Discharge Disposition   Discharged to short-term care facility  Condition at discharge: Stable    Hospital Course   Mr. Arnulfo Pritchett is a 62 yo male with history including DM2; HTN; CHF (EF in the 20's);  CAD; PAD with prior revascularization; ESRD on PD; gout; HLD; TALI (intolerant to CPAP); and h/o RCC s/p right nephrectomy (2022). He presented to [facility] 10/15/2024 with worsening pain and redness in the left great toe with streaking up the leg. There was concern for limb ischemia and transferred to [facility] 10/16/2024 for management. Underwent left lower extremity revascularization including bypass 10/25/2024. Underwent left great toe amputation 10/27/2024. Developed right groin wound infection requiring antibiotics and aggressive wound cares, with subsequently improvement. Overall, progressing and plan TCU.     Critical limb ischemia of the left lower extremity.  # S/p left femoral endarterectomy with bovine pericardial patch angioplasty; left distal common femoral artery/proximal superficial femoral artery to tibioperoneal trunk bypass; and temporary closure of left groin wound with wound VAC 10/25/2024.  # S/p ligation of side branches of the left great saphenous vein and temporary closure of right groin with application of wound VAC 10/27/2024.  Ischemic left great toe wound - s/p amputation of left great toe 10/27/2024.  Left groin wound infection.  H/o acute RLE arterial occlusion s/p SFA popliteal balloon angioplasty and stenting (5/2024).  * Initial presentation to [facility] 10/15 as above. On initial evaluation, pt was afebrile, hemodynamically stable. Labs notable for CBC with WBC normal, hgb 12.5; cr 9.84 (chronic PD); CRP 55.81; INR 4.42. Transferred to [facility] 10/16.  * MRI left foot  showed constellation of findings favoring gangrene; no other osseous abnormalities to suggest osteomyelitis; degenerative changes.  * IR left lower extremity angiogram 10/17 abandoned due to limited arterial access options. CTA showed significant bilateral PAD including left lower extremity focal calcified severe stenosis of the proximal superficial femoral artery, long segment occlusion of the mid to distal popliteal artery  with low attenuation changes which may represent thrombus or soft atherosclerotic plaque, reconstitution of the origins of the posterior tibial arteries and severe tandem stenoses of the distal posterior tibial artery.  *ID following, had been on zosyn transitioned to augmented for discharge plan to continue through 11/11.   * 10/25: Underwent left lower extremity bypass as above* 10/27: Underwent left great toe amputation as above. Later noted a distinct loss of signals on a.m. rounds; duplex sonography showed flow reversal in the mid=graft with paucity of flow going beyond mid-graft. Taken urgently for surgery as above.  * Warfarin restarted. Clopidogrel restarted (had been on ASA this hospitalization)  - Continue antibiotics through 11/11  - Vascular surgery follow up in 2-3 weeks  - PT/OT at facility    Pain control related to above  * This hospitalization, noted that patient wanted low-dose pain meds for pain control.  * Ultimately started on low dose PRN opioids (see prior notes).  - Continue PRN oxycodone 2.5-5 mg q4h     Ischemic CM (HFrEF)  CAD with h/o stents x3 (last in 2021)  Hypertension (benign essential)  HLD.  [PTA BP meds: carvedilol 12.5 mg at bedtime; sacubitril-valsartan 49-51 mg BID; torsemide 100 mg qam. Evaluated by cardiology this hospitalization. Euvolemic at discharge.  * Echo 6/2024 showed LVEF 20-25%, segmental wall motion globally hypokinetic.   * Nuc stress test 10/22 was abnormal but no evidence of ischemia; medium sized area of infarction in the entire inferolateral segment(s) of the left ventricle extending into basal inferior segment; small area of nontransmural infarction in the apical segment(s) of the left ventricle; TID absent; left ventricular ejection fraction at stress 26%.  - Continue atorvastatin and clopidogrel.  - Continue carvedilol, sacubitril-valsartan and torsemide.     Chronic paroxysmal afib s/p atrial ablation (6/2023)  - Continue carvedilol.  - Continue warfarin,  INR check in one week      Acute blood loss anemia on chronic anemia  Hgb 10.9 on admit and with gradual drop in hgb likely multifactorial with recent infection, anemia of CKD and recent surgeries, monitor. Required pRBC x1 on 11/4. Received aranesp 150mcg on 11/5. 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 Access via RLQ double cuffed coiled PD catheter placed 4/16/2021. Nephrology consulted for PD.  * Treated with sodium zirconium cyclosilicate for hyperkalemia this hospitalization.  * K normalized 10/17.  - Continue nightly PD   - Continue calcitriol, cinacalcet and sevelamer carbonate.  - Continue every other day gentamicin cream to PD cath site.  - Appreciate nephrology help - I d/w Dr. Guzman 11/4.     Hyperenhancing left lower pole renal nodule on CT  Hx of RCC s/p R nephrectomy (5/2022).  *  CTA 10/17 also showed a 4 mm hyperenhancing nodule in lower pole of the left kidney, noted  that this may represent a small primary renal cell carcinoma.  * Pt made aware of the above finding.  - Pt is aware of this finding and will follow-up closely with his urologist after discharge.     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  * Pt declined diabetic diet this hospitalization.  - Decreased glargine to 33U at bedtime with sliding scale insulin (plan to discharge on 35u at bedtime without sliding scale)      Constipation- improved   - Continue scheduled polyethylene glycol and senna-docusate PRN senna-docusate; PRN bisacodyl suppository.     Gout- Continue allopurinol.     GERD- Continue PPI.     TALI- Noted intolerant to CPAP.    Consultations This Hospital Stay   PHARMACY TO DOSE WARFARIN  NEPHROLOGY IP CONSULT  CARE MANAGEMENT / SOCIAL WORK IP CONSULT  PODIATRY IP CONSULT  VASCULAR SURGERY IP CONSULT  INTERVENTIONAL RADIOLOGY  ADULT/PEDS IP CONSULT  VASCULAR ACCESS ADULT IP CONSULT  PHARMACY IP CONSULT  INFECTIOUS DISEASES IP CONSULT  CARDIOLOGY IP CONSULT  PHYSICAL THERAPY ADULT IP CONSULT  PHARMACY IP CONSULT  PHYSICAL THERAPY ADULT IP CONSULT  PHARMACY TO DOSE WARFARIN  PHARMACY TO DOSE WARFARIN  PHYSICAL THERAPY ADULT IP CONSULT  OCCUPATIONAL THERAPY ADULT IP CONSULT  PHYSICAL THERAPY ADULT IP CONSULT  OCCUPATIONAL THERAPY ADULT IP CONSULT    Code Status   Full Code    Time Spent on this Encounter   I, Rafi Mixon MD, personally saw the patient today and spent greater than 30 minutes discharging this patient.       Rafi Mixon MD  [address] [address]  Phone: [phone]  Fax: [phone]  ______________________________________________________________________    Physical Exam   Vital Signs: Temp: 98.3  F (36.8  C) Temp src: Oral BP: 101/74 Pulse: 100   Resp: 18 SpO2: 100 % O2 Device: None (Room air)    Weight: 168 lbs 10.43 oz    Patient evaluated on day of discharge.  Denies any acute concerns, no significant pain, shortness, chest pain, fevers or chills or other concerns.    Constitutional: awake, alert, cooperative, NAD  HEENT: normocephalic, anicteric sclerae, MMM   Pulmonary: no increased work of breathing on room air, lungs clear to auscultation bilaterally without wheezes or rales   Cardiovascular: RRR, normal S1 and S2, no murmur appreciated   GI: BS+, soft, non-tender, non-distended, without guarding or rigidity  Skin: warm, dry, left groin wound with firmness on distal aspect, mildly tender, no purulent drainage or surrounding erythema, LLE wounds not bleeding  MSK: no peripheral edema bilaterally  Neuro: AAOx3, no gross focal neurologic deficits noted          Primary Care Physician   Chai Griffin    Discharge Orders      Medication Therapy Management Referral      Weight bearing status    Minimal heel weight bearing left foot in post op shoe.      Follow Up and recommended labs and tests    Follow up with Dr. Joseph in 2-3 weeks from discharge.         For appointments, questions or concerns: call: [phone]    Office fax number:  [phone]     Wound care    Site:   left foot  Instructions:  every other day dressing change.     1. Apply betadine to incision  2. Apply 4x4 gauze pad, gauze roll and tape     General info for SNF    Length of Stay Estimate: Short Term Care: Estimated # of Days <30  Condition at Discharge: Stable  Level of care:skilled   Rehabilitation Potential: Good  Admission H&P remains valid and up-to-date: Yes  Recent Chemotherapy: N/A  Use Nursing Home Standing Orders: Yes     Mantoux instructions    Give two-step Mantoux (PPD) Per Facility Policy Yes     Follow Up and recommended labs and tests    Follow up with intermediate physician.  The following labs/tests are recommended: weekly labs with PD.     Reason for your hospital stay    You are hospitalized for left leg infection and your course was complicated by groin infection.     Glucose monitor nursing POCT    Before meals and at bedtime     Activity - Up ad arnulfo     Wound care (specify)    Site:   Left groin  Instructions:  Change daily with Aquacel Ag cover with Kerlix fluff.     Wound care    Site:   Lower extremity leg incisions(not foot)  Instructions:  Keep clean and dry, wrap with Kerlix as needed     Full Code     Physical Therapy Adult Consult    Evaluate and treat as clinically indicated.    Reason:  weakness/deconditioning     Occupational Therapy Adult Consult    Evaluate and treat as clinically indicated.    Reason:  weakness/deconditioning     Fall precautions     Diet    Follow this diet upon discharge: Current Diet:Orders Placed This Encounter      Snacks/Supplements Adult: Nepro Oral Supplement; Between Meals      Combination Diet Renal Diet (dialysis); 2 gm NA Diet       Significant Results and Procedures   Most Recent 3 CBC's:  Recent Labs   Lab Test  11/09/24  0718 11/08/24  0652 11/07/24  0537   WBC 8.9 8.8 8.8   HGB 8.3* 7.6* 8.0*    99 98    301 307     Most Recent 3 BMP's:  Recent Labs   Lab Test 11/09/24  1150 11/09/24  0815 11/09/24  0718 11/08/24  1211 11/08/24  0652 11/07/24  0744 11/07/24  0537   NA  --   --  133*  --  133*  --  132*   POTASSIUM  --   --  4.2  --  4.1  --  4.6   CHLORIDE  --   --  95*  --  95*  --  93*   CO2  --   --  24  --  25  --  21*   BUN  --   --  43.8*  --  48.1*  --  49.6*   CR  --   --  7.51*  --  7.26*  --  7.44*   ANIONGAP  --   --  14  --  13  --  18*   TERENCE  --   --  8.9  --  9.1  --  9.0   * 111* 144*   < > 192*   < > 204*    < > = values in this interval not displayed.     Most Recent 3 INR's:  Recent Labs   Lab Test 11/09/24  0718 11/08/24 0652 11/07/24 0537   INR 2.31* 2.02* 2.15*   ,   Results for orders placed or performed during the hospital encounter of 10/15/24   MR Foot Left w/o Contrast    Narrative    MR left foot without  contrast 10/16/2024 4:19 PM    History: left great toe tip gangrene with known PAD, eval for osteo    Techniques: Multiplanar multisequence imaging of the left foot was  obtained without  administration of intravenous contrast.    Comparison: Radiographs dated 10/7/2024    Findings:    Bones    The distal phalanx of the first digit appears pointed distally with  replacement of normal fatty marrow and a soft tissue defect at the  distal tuft. About 14 mm of the distal phalanx appear normal with  normal fat signal and without significant edema. There appears to be a  clear line between the distal tuft abnormalities in the relatively  normal proximal two thirds of the first digit.     Large subcortical cyst at the distal and lateral aspect of the  proximal phalanx of the first digit, degenerative changes of the first  MTP joint with joint space narrowing, subcortical cystic changes and  cartilage loss    No fracture, marrow contusion or other marrow infiltrative  changes.    Joints and periarticular soft tissue    Joint effusion: Physiologic amount of joint fluid are present.    Plantar plates: Intersesamoidal ligament and sesamoidal phalangeal  ligaments of the first metatarsophalangeal joints are intact. Plantar  plates of the second through fifth toe at metatarsophalangeal joints  are grossly intact.    Intermetatarsal spaces: No interdigital neuroma. No intermetatarsal  bursitis.    Ligaments and Tendons    Lisfranc interosseous ligament: Intact.    Tendons: The visualized courses of flexor and extensor tendons are  intact.     Muscles    Edema throughout the entire plantar musculature.      Impression    Impression:    1. Distal phalanx of the first digit appears pointed distally with  replacement of normal fatty marrow and a soft tissue defect at the  distal tuft. About 14 mm of the distal phalanx appear normal with  normal fat signal and without significant edema. Constellation of  findings favors gangrene.  2. No other osseous abnormalities to suggest osteomyelitis.  3. Degenerative changes of the interphalangeal joint and the  metatarsophalangeal joint of the first digit.  4. Widespread edema throughout the musculature as can be seen in  microvascular disease associated with diabetes.    JUTTA ELLERMANN, MD         SYSTEM ID:  [id number]    Lower Extremity Arterial Duplex Left    [facility]  DATE: 10/16/2024    EXAM: LEFT LOWER EXTREMITY ARTERIAL DUPLEX    INDICATION: Peripheral vascular disease     TECHNIQUE: Duplex imaging was performed utilizing gray-scale,  compression, augmentation as appropriate, color-flow, Doppler waveform  analysis, and spectral Doppler imaging.    COMPARISON: [id number].    FINDINGS:     Heavily calcified atherosclerotic disease obscuring visualization of  portions of the left lobe show any vascular structure. Monophasic flow  in the mid and proximal SFA and throughout the remainder of the left  lower extremity. Elevated  velocity distal SFA. Significantly abnormal  popliteal and infrapopliteal waveforms.      Impression    IMPRESSION:   1.  Heavily calcified atherosclerotic disease throughout the left  lower extremity obscuring portions of the underlying vasculature.  Waveforms suggest a severe proximal superficial femoral artery  stenosis with a likely distal superficial femoral artery stenosis also  seen. Severely abnormal waveforms in the infrapopliteal vessels  suggest severe disease in these vessels as well. Consider CTA with  runoff for further evaluation.    EMERSON HURTADO MD         SYSTEM ID:  [id number]   IR Lower Extremity Angiogram Left    Narrative    [place] [facility]   LOCATION: [facility]  DATE: 10/17/2024    PROCEDURE:  LIMITED BILATERAL GROIN ULTRASOUND    INTERVENTIONAL RADIOLOGIST: Jairo Garcia MD    INDICATION: 63-year-old male with LLE CLTI with non-healing great toe  wound. LLE US 10/16 showed severe calcific atherosclerosis throughout,  with monophasic waveforms starting in the proximal SFA. Unfortunately  no additional outside imaging is available for review. Request for  left lower extremity angiogram and possible intervention.    CONSENT: The risks, benefits and alternatives of the procedure were  discussed with the patient or representative in detail. All questions  were answered. Informed consent was given to proceed with the  procedure.    MODERATE SEDATION: Versed 1 mg IV; Fentanyl 50 mcg IV. During the time  out, immediately prior to the administration of medications, the  patient was reassessed for adequacy to receive conscious sedation.   Under physician supervision, Versed and fentanyl were administered for  moderate sedation. Pulse oximetry, heart rate and blood pressure were  continuously monitored by an independent trained observer. The  physician spent 15 minutes of face-to-face sedation time with the  patient.    CONTRAST: None mL Omnipaque intravascular.  ANTIBIOTICS:  None.  ADDITIONAL MEDICATIONS: None.    FLUOROSCOPIC TIME: 0.1 minutes.  RADIATION DOSE: Air Kerma: 0.7 mGy.    COMPLICATIONS: No immediate complications.    UNIVERSAL PROTOCOL: The operative site was marked and any prior  imaging was reviewed. Required items including blood products,  implants, devices and special equipment was made available. Patient  identity was confirmed either verbally, with demographic information,  hospital assigned identification or other identification markers. A  timeout was performed immediately prior to the procedure.    STERILE BARRIER TECHNIQUE: Maximum sterile barrier technique was used.  Cutaneous antisepsis was performed at the operative site with  application of 2% chlorhexidine and large sterile drape. Prior to the  procedure, the  and assistant performed hand hygiene and wore  hat, mask, sterile gown, and sterile gloves during the entire  procedure.    PROCEDURE:    Patient was positioned supine on the procedure table in the bilateral  groins were prepped and draped in the usual sterile fashion.   fluoroscopic image of the right groin was obtained. Ultrasound of the  bilateral groins was performed, with multiple grayscale and color  Doppler images saved. Based on these results and safety concerns for  arterial access site, the procedure was aborted. No intervention  performed.    FINDINGS:  1.  image of the right groin demonstrates severe atherosclerotic  calcifications of the right femoral arterial vasculature.  2. Limited ultrasound of the bilateral groins demonstrates severe  calcified atherosclerosis of the bilateral common femoral arteries,  precluding safe femoral arterial access. Suspected occlusion of the  distal right common femoral artery, with reconstitution of the  proximal SFA and PFA which are also diseased.      Impression    IMPRESSION:   Abandoned left lower extremity angiogram due to limited arterial  access options, as above. No intervention  performed.    PLAN:   - Recommend CTA abdomen/pelvis runoff for further evaluation of  disease and to guide procedure planning. LLE intervention may require  brachial access and/or combined surgical/endovascular approach.  - The above was discussed with vascular surgery team, Dr. Hein, who  is in agreement.    PEYMAN HERBERT MD         SYSTEM ID:  [id number]   CTA Abdomen Pelvis Runoff w Contrast    Narrative    EXAM: CTA ABDOMEN PELVIS RUNOFF W CONTRAST  LOCATION: [facility]  DATE: 10/17/2024    INDICATION: Severe PAD with LLE CLTI with non healing wound, procedure planning for possible LLE intervention  COMPARISON: Left lower extremity arterial duplex ultrasound 10/16/2024.  TECHNIQUE: Helical acquisition through the abdomen, pelvis, and bilateral lower extremities was performed during the arterial phase of contrast enhancement using IV Contrast. 2D and 3D reconstructions were performed by the CT technologist. Dose reduction   techniques were used.   CONTRAST: 100ml isovue 370    FINDINGS:  AORTA: Moderate, calcified atherosclerotic changes throughout the abdominal aorta which is moderately tortuous at the infrarenal portion. No significant aortic stenosis or aneurysmal dilatation. Normal caliber celiac and superior mesenteric arteries.   Moderate calcified changes within the left renal artery with mild tandem stenoses. Opacification of a right renal artery stump. Patent inferior mesenteric artery.    RIGHT LEG: Moderate calcified changes of the common and internal iliac arteries without significant stenosis. Normal caliber external iliac artery. Near circumferential calcified atherosclerotic changes of the common femoral artery without stenosis.   Moderate calcified changes of the profunda femoris and superficial femoral arteries without stenosis. Bulky calcified changes of the popliteal artery with a patent, above-knee popliteal stent. No severe stenosis or occlusion of the below-knee  popliteal   artery. Occluded anterior tibial artery origin with reconstitution at its proximal vertical segment. Normal caliber popliteal artery opacified down to the pedal artery. Normal caliber peroneal artery. Moderate calcified atherosclerotic changes of the   posterior tibial artery. There appear to be tandem, focal moderate to severe stenoses of the distal posterior tibial artery.    LEFT LEG: Moderate calcified atherosclerotic changes of the common and internal iliac arteries without stenosis. Normal caliber external iliac artery. Moderate calcified atherosclerotic changes of the common femoral and profunda femoris arteries without   significant stenosis. Moderate calcified atherosclerotic changes of the superficial femoral artery with what appears to be a focal, severe calcified stenosis at its proximal aspect. Circumferential, bulky calcified atherosclerotic changes of the   popliteal artery. Irregular, long segment occlusion of the mid to distal popliteal artery for a length of approximately 9.3 cm, secondary to low attenuation material which may represent thrombus versus soft atherosclerotic plaque. Reconstitution of the   infrapopliteal arteries at the origins. Mild atherosclerotic irregularity of the anterior tibial artery, tibioperoneal trunk and peroneal arteries without significant stenosis. Irregular calcified changes of the posterior tibial artery with appear to be   severe stenoses at its distal aspect..    LUNG BASES: Small to moderate right pleural effusion. Small left pleural effusion.    HEPATOBILIARY: Normal.    PANCREAS: Normal.    SPLEEN: Normal.    ADRENAL GLANDS: Normal.    KIDNEYS: 4 mm hyperenhancing nodule within the anterior aspect of the lower pole the left kidney (image 77 series 4). Benign-appearing left renal cysts. No hydronephrosis. Absent right kidney. Mildly distended bladder.    BOWEL: No obstruction or inflammatory change. Moderate amount of free fluid, mainly within the  perihepatic space and right paracolic gutter. Moderate pneumoperitoneum. Indwelling left abdominal peritoneal dialysis catheter with its loop formed within the   retrovesical region, in adequate position.    LYMPH NODES: No lymphadenopathy.    PELVIC ORGANS: Moderate prostatic enlargement.    MUSCULOSKELETAL: Multilevel degenerative disc disease, most marked at the L4 interspace. Chronic appearing ununited fractures of the visualized right ninth and 10th ribs. Moderate degenerative changes of the bilateral hips and knees. Small right knee   effusion.      Impression    CONCLUSION:  1.  Right leg: No inflow or femoropopliteal segment obstructions. Occluded anterior tibial artery origin with proximal reconstitution without distal stenoses. Normal caliber peroneal artery. Tandem severe stenosis of the distal posterior tibial artery.  2.  Left leg: No inflow obstruction. Focal calcified severe stenosis of the proximal superficial femoral artery. Long segment occlusion of the mid to distal popliteal artery with low attenuation changes which may represent thrombus or soft   atherosclerotic plaque. Reconstitution of the origins of the posterior tibial arteries. Severe tandem stenoses of the distal posterior tibial artery.  3.  Moderate amount of free fluid. Pneumoperitoneum. This is likely secondary to the peritoneal dialysis catheter.  4.  Bilateral pleural effusions, greater on the right.  5.  4 mm hyperenhancing nodule within the lower pole the left kidney. This may represent a small primary renal cell carcinoma.   US Lower Extremity Venous Mapping Left    Narrative    EXAM: US LOWER EXTREMITY VENOUS MAPPING LEFT  LOCATION: [facility]  DATE: 10/19/2024    INDICATION: PAD in need of femoral to below knee popliteal bypass  COMPARISON: None.  TECHNIQUE: Ultrasound examination of the left lower extremity veins was performed, including gray-scale and compression imaging.     LOWER  EXTREMITY  FINDINGS:       VENOUS DIAMETERS  LEFT GREAT SAPHENOUS VEIN  Upper Thigh: 0.8-4.0 mm  Mid Thigh: 3.7-3.4 mm  Lower Thigh: 3.9-3.7 mm  Knee: 3.4 mm  Upper Calf: 3.9 mm  Mid Calf: 3.0 mm  Lower Calf: 2.8 mm  Ankle: 2.8 mm    LEFT SMALL SAPHENOUS VEIN  Not assessed       Impression    IMPRESSION:  1. Left greater saphenous vein mapping as described.   X-ray lt Foot 3 vw port: In PACU    Narrative    EXAM: XR FOOT PORTABLE LEFT 3 VIEWS  LOCATION: [facility]  DATE: 10/27/2024    INDICATION: Postop.  COMPARISON: 10/07/2024.      Impression    IMPRESSION: Postop interval partial left great toe amputation through the neck of the proximal phalanx left great toe. Amputation margin is sharp without evidence for acute osteomyelitis at the amputation margin. Remainder is unchanged. Extensive   arterial calcification. No acute displaced left foot fracture or dislocation. Remaining joint spaces are unchanged.     US Lower Extremity Arterial Duplex Left    Narrative    EXAM: US LOWER EXTREMITY ARTERIAL DUPLEX LEFT  LOCATION: [facility]  DATE: 10/27/2024    INDICATION: assess recent left fem   tp trunk bypass  COMPARISON: CTA abdomen and pelvis 10/17/2024  TECHNIQUE: Duplex utilizing 2D gray-scale imaging, Doppler interrogation with color-flow and spectral waveform analysis.    FINDINGS:     Post surgical changes of recent left femoral artery to TP trunk bypass. Per the operative note, a partially translocated nonreversed in situ great saphenous vein was used for the bypass.    LEFT LOWER EXTREMITY ARTERIAL ASSESSMENT:  Common femoral artery: 48 cm/s,    Proximal bypass graft anastomosis: 23 cm/s, 6 mm  Proximal bypass graft: 202 cm/s, 5 mm  Mid bypass graft: 45 cm/s, 3 mm    Prominent bypass venous branch in the mid to distal thigh with high flow. There is reversal of flow in the bypass distal to this venous branch. The distal bypass graft is not visualized, partially due to  limitations related to overlying skin staples.    Waveforms in the patent proximal and mid bypass graft are very low resistance.         Impression    IMPRESSION:  1.  Prominent venous branch originating from the LLE bypass in the mid to distal thigh is functioning as an arterial venous fistula. The bypass is patent proximal to this branch with very low resistance waveforms. There is reversal of flow in the bypass   distal to this venous branch.  2.  The distal anastomosis of the bypass is unable to visualized with ultrasound.  3.  Proximal to mid bypass is patent. Elevated velocity in the proximal bypass may suggest stenosis.   XR Surgery MOODY L/T 5 Min Fluoro w Stills    Narrative    XR SURGERY MOODY FLUORO LESS THAN 5 MIN W STILLS 10/27/2024 3:26 PM     COMPARISON: CTA runoff 10/17/2024    HISTORY: Angiogram lower extr.left, fluoro time 2 minutes 46  seconds,mGy 10.89, 18 images, carm2.    NUMBER OF IMAGES ACQUIRED: 18    FLUOROSCOPY TIME: 2.8 minute(s)      Impression    IMPRESSION: Intraoperative digital subtraction angiography supply to  the vascular surgeon. Initial images demonstrate catheterization of  the left great saphenous vein with opacification of the superior  aspect of the left deep venous system. Prominent great saphenous vein  branch communicating with the femoral vein. Subsequent images  demonstrate antegrade flow within the distal aspect of the great  saphenous vein, which had been used as a femoral-TP trunk bypass  graft. There appears to be at least a two-vessel runoff.    SKYE NASSAR MD         SYSTEM ID:  [id number]   Cardiac Catheterization    Narrative    Please see operative notes for details.     NM Lexiscan stress test (nuc card)     Value    Target     Baseline Systolic     Baseline Diastolic     Last Stress Systolic     Last Stress Diastolic BP 90    Baseline HR 79    Max HR  88    Max Predicted HR  57    Rate Pressure Product 11,880.0    Left Ventricular EF 26     Narrative      The nuclear stress test is abnormal. No evidence of ischemia. presence   of visecral tracer artifact decreases specificity.    There is a medium sized area of infarction in the entire inferolateral   segment(s) of the left ventricle extending into basal inferior segment.    There is a small area of nontransmural infarction in the apical   segment(s) of the left ventricle.    TID is absent.    Left ventricular function is severely reduced.    The left ventricular ejection fraction at stress is 26%.    There is no prior study for comparison.         Discharge Medications   Discharge Medication List as of 11/9/2024 12:47 PM        START taking these medications    Details   amoxicillin-clavulanate (AUGMENTIN) 250-125 MG tablet Take 1 tablet by mouth every 24 hours for 4 days., Transitional      !! gentamicin (GARAMYCIN) 0.1 % external cream Apply topically daily as needed (promotion of catheter exit site healing.).Transitional      glucose 40 % (400 mg/mL) gel Take 15-30 g by mouth every 15 minutes as needed for low blood sugar.Transitional      oxyCODONE (ROXICODONE) 5 MG tablet Take 0.5-1 tablets (2.5-5 mg) by mouth every 6 hours as needed for severe pain., Transitional       !! - Potential duplicate medications found. Please discuss with provider.        CONTINUE these medications which have CHANGED    Details   carvedilol (COREG) 6.25 MG tablet Take 1 tablet (6.25 mg) by mouth at bedtime., Transitional      insulin glargine (LANTUS PEN) 100 UNIT/ML pen Inject 35 Units subcutaneously at bedtime., TransitionalIf Lantus is not covered by insurance, may substitute Basaglar or Semglee or other insulin glargine product per insurance preference at same dose and frequency.             CONTINUE these medications which have NOT CHANGED    Details   acetaminophen (TYLENOL) 500 MG tablet Take 1,000 mg by mouth 2 times daily., Historical      acetaminophen-codeine (TYLENOL #3) 300-30 MG per tablet Take 1  tablet by mouth every 6 hours as needed for severe pain., Historical      allopurinol (ZYLOPRIM) 100 MG tablet Take 200 mg by mouth every evening, Historical      atorvastatin (LIPITOR) 40 MG tablet Take 40 mg by mouth at bedtime., Historical      B Complex-C-Folic Acid (DIALYVITE) TABS Take 1 tablet by mouth daily., Historical      calcitRIOL (ROCALTROL) 0.25 MCG capsule Take 0.25 mcg by mouth at bedtime., Historical      cinacalcet (SENSIPAR) 60 MG tablet Take 60 mg by mouth. Take 1 tablet (60 mg) three times a week: Monday, Wednesday, and Friday nights, Historical      clopidogrel (PLAVIX) 75 MG tablet Take 75 mg by mouth every evening., Historical      melatonin 5 MG tablet Take 5 mg by mouth at bedtime, Historical      omeprazole (PRILOSEC) 40 MG DR capsule Take 40 mg by mouth daily., Historical      sacubitril-valsartan (ENTRESTO) 49-51 MG per tablet Take 1 tablet by mouth 2 times daily, Historical      SEVELAMER CARBONATE PO Take 800 mg by mouth 3 times daily (with meals), Historical      torsemide (DEMADEX) 100 MG tablet Take 100 mg by mouth every morning, Historical      warfarin ANTICOAGULANT (COUMADIN) 5 MG tablet Take by mouth daily. Take 5 mg MWF nights & 1.5 tablet ROW, Historical      !! gentamicin (GARAMYCIN) 0.1 % external cream Apply topically daily. Apply topically on peritoneal access site to prevent infectionsHistorical       !! - Potential duplicate medications found. Please discuss with provider.        STOP taking these medications       cephALEXin (KEFLEX) 250 MG capsule Comments:   Reason for Stopping:             Allergies   No Known Allergies

## 2024-11-09 NOTE — PROGRESS NOTES
Care Management Discharge Note    Discharge Date: 11/09/2024       Discharge Disposition: Skilled Nursing Facility    Discharge Services: Other (see comment) (Patient is planning to hire a private duty PCA)    Discharge DME: None    Discharge Transportation: car, drives self, family or friend will provide    Private pay costs discussed: transportation costs    Does the patient's insurance plan have a 3 day qualifying hospital stay waiver?  No    PAS Confirmation Code:    Patient/family educated on Medicare website which has current facility and service quality ratings:      Education Provided on the Discharge Plan: Other (see comments) (ongoing)  Persons Notified of Discharge Plans: Patient, facility, Nursing, HUC  Patient/Family in Agreement with the Plan: yes    Handoff Referral Completed: No, handoff not indicated or clinically appropriate    Additional Information:  Paged Dr. Mixon to determine if patient is medically ready. Confirmed with Apurva that patient is medically ready today. [facility] (ph: [phone]) and spoke with Loy. She confirmed discharge time for today and is in agreement. Will fax over signed script and orders once received. PAS completed. Updated bedside RN of discharge for today.     Discharge orders received at 0910 and faxed to [facility]. Signed script received and faxed a copy to TCU.  Confirmed with RN that Patient will need to bring his cyclier and his PD bag supplies with him to the facility and RN believed they were already there but will confirm. Updated HUC of Discharge       PAS-RR    D: Per DHS regulation, SW completed and submitted PAS-RR to [place] [employer] Direct Connect via the Senior LinkAge Line.  PAS-RR confirmation # is : [id number]        P: Further questions may be directed to Senior LinkAge Line at #[phone], option #4 for PAS-RR staff.       Amee Carias, GHASSAN, [facility] Medical CenterSW  Social Work- Inpatient Care  Coordination  [facility]

## 2024-11-09 NOTE — PLAN OF CARE
Goal Outcome Evaluation:  Date & Time: 11/8/24 9135-3716  Surgery/POD#: 15 Left Femoral Endartectomy, Left Femoral to Tibial peroneal Trunk Bypass w/ Left great sephaneous vein. Wound vacc in place on left groin. Pod 13 Left great toe amputation    Behavior & Aggression: Green  Fall Risk: Yes   Orientation:A&Ox4   ABNL VS/O2: VSS on RA   ABNL Labs: See chart   Pain Management: PRN oxycodone x1 overnight  Bowel/Bladder: Continent. Peritoneal dialysis patient   Drains: PIV SL  Wounds/incisions: LLE incision with dressing CDI. Left toe incision, Left groin incision. Peritoneal dialysis cath infusing overnight.   Diet:2 G sodium diet. Renal diet   Activity Level: Ax1   Tests/Procedures: Pending   Anticipated  DC Date: Pending   Significant Information: Pulses + w/ doppler.

## 2024-11-11 ENCOUNTER — TELEPHONE (OUTPATIENT)
Dept: PODIATRY | Facility: CLINIC | Age: 64
End: 2024-11-11
Payer: COMMERCIAL

## 2024-11-11 ENCOUNTER — DOCUMENTATION ONLY (OUTPATIENT)
Dept: ANTICOAGULATION | Facility: CLINIC | Age: 64
End: 2024-11-11
Payer: COMMERCIAL

## 2024-11-11 NOTE — PROGRESS NOTES
ANTICOAGULATION  MANAGEMENT: Discharge Review    Arnulfo Pritchett chart reviewed for anticoagulation continuity of care    Hospital Admission on 10/15/24 for critical limb ischemia, schemic left great tow and s/p amputation of the left great toe, left groin wound infection.    Discharge disposition: TCU    Results:    Recent labs: (last 7 days)     11/05/24  0648 11/06/24  0639 11/07/24  0537 11/08/24  0652 11/09/24  0718   INR 3.20* 2.61* 2.15* 2.02* 2.31*     Anticoagulation inpatient management:     home regimen continued    Anticoagulation discharge instructions:     Warfarin dosing: home regimen continued   Bridging: No   INR goal change: No      Medication changes affecting anticoagulation: Yes: Augmentin    Additional factors affecting anticoagulation: Yes: wounds, inflammation, hospitalization     PLAN     No adjustment to anticoagulation plan needed    Patient not contacted  ACC will resume monitoring upon discharge from TCU    No adjustment to Anticoagulation Calendar was required    Nely Gomez RN  11/11/2024  Anticoagulation Clinic  [facility] for routing messages: merlin LAURA  ACC patient phone line: [phone]

## 2024-11-11 NOTE — TELEPHONE ENCOUNTER
Other: pt needs a post op visit with Dr. Joseph.  Ortho con does not schedule these.  I tried from the return visit order and it will not let me.  Pt is at a skilled nursing home facility now.       Could we send this information to you in [place] or would you prefer to receive a phone call?:   Patient would prefer a phone call   Okay to leave a detailed message?: Yes at Other phone number:  Anabel is at .  She is at the nursing home.

## 2024-11-12 NOTE — TELEPHONE ENCOUNTER
Phone call to number listed and spoke to Julianne. JUVENAL Little, is currently not available. Asked to have Anabel return our call to schedule an appointment for patient.   Scheduling number provided. If any questions, please ask to speak to the nurse.   She verbalized understanding.     ANN-MARIE Roberts RN

## 2024-11-15 NOTE — TELEPHONE ENCOUNTER
Left a message with Lisa, , for Anabel at [facility]. Asked that she return our call to schedule an appointment with the nurse for patient's post op appointment.     ANN-MARIE Roberts RN

## 2024-11-19 NOTE — TELEPHONE ENCOUNTER
3rd call to TCU and spoke to Jayla. She states Anabel is the only one that schedules appointments. Asked that she have Anabel return our call. We are trying to schedule a post op appointment from a partial L toe amputation done 10/27/24 by Dr. Joseph.   Offered appointment on 11/26/24 at 1:45 or 2:45 with check in time 15 minutes prior to that. She will have Anabel return our call. Scheduling number was provided.     ANN-MARIE Roberts RN

## 2024-11-19 NOTE — TELEPHONE ENCOUNTER
Phone call to Anabel and appointment was scheduled for 11/26/24 at 1:45 pm with 1:30 arrival.     ANN-MARIE Roberts RN

## 2024-11-19 NOTE — CONFIDENTIAL NOTE
Anabel Bacon is calling back from [facility] to schedule for Dr. Joseph, on 11/26 at 1:30.  She would like this scheduled.  Anabel can be reached at .  This is for a post op appt which ortho con does not schedule for.

## 2024-11-20 ENCOUNTER — TELEPHONE (OUTPATIENT)
Dept: OTHER | Facility: CLINIC | Age: 64
End: 2024-11-20

## 2024-11-20 NOTE — TELEPHONE ENCOUNTER
[facility]    Who is the name of the provider?:  ARMANDO GRAY   What is the location you see this provider at/preferred location?: [place]  Person calling / Facility: [facility]  Phone number:  [phone]   Nurse call back needed:  YES     Reason for call:  [facility] called stating patient groin wound has some concerns including bleeding and would like to know if the dressing can/should be changed or if it should be left the way that it is until patient has post-op visit with Cecilia Stearns on 11/257/24.  Adriana states a detailed VM may be left if she does not answer the call. Routing to RN and also Cecilia Stearns and Dr Gray to further review.    Pharmacy location:   n/a  Outside Imaging: n/a   Can we leave a detailed message on this number?  YES     11/20/2024, 12:59 PM

## 2024-11-20 NOTE — TELEPHONE ENCOUNTER
10/27/24 - LEFT FEMORAL ENDARTERECTOMY, LEFT FEMORAL TO TIBIAL PERONEAL TRUNK BYPASS WITH LEFT GREAT SEPHANEOUS VEIN, WOUND VAC PLACEMENT ON LEFT GROIN with Dr. Gray.    Per 11/9/24 discharge summary:  Wound care (specify)     Site:   Left groin  Instructions:  Change daily with Aquacel Ag cover with Kerlix fluff.     Wound care     Site:   Lower extremity leg incisions(not foot)  Instructions:  Keep clean and dry, wrap with Kerlix as needed     Future Appointments   Date Time Provider Department Center   11/27/2024 10:00 AM Cecilia Stearns NP [facility]     Returned call to Ramo DUNNE with the above wound care instructions, however still requested a call back to discuss the left groin concerns that were mentioned in the previous note.    Vandana Matias, DAVIDN, RN, CV-BC, CNOR  [facility] [place]

## 2024-11-25 NOTE — TELEPHONE ENCOUNTER
Routing to [facility] RN triage pool to advise, provider response was sent to writer. Please review and advise, please route back to [facility] appointment scheduling pool.

## 2024-11-25 NOTE — TELEPHONE ENCOUNTER
Patient is currently scheduled to see [facility] APRN CNP on 11/27/24 for 1st post op appt.    10/25/2024   Procedure w/ Dr. Gray:  1.  Left femoral endarterectomy with bovine pericardial patch angioplasty.  2.  Left distal common femoral artery/proximal superficial femoral artery to tibioperoneal trunk bypass using ipsilateral partially translocated nonreversed in situ great saphenous vein.  3. Temporary closure of left groin wound with wound vacc device.     Adriana has not returned call to clinic, will keep patient's appointment on 11/27/24 as scheduled.  Routing back to scheduling as FYI.   supervision

## 2024-11-26 ENCOUNTER — OFFICE VISIT (OUTPATIENT)
Dept: PODIATRY | Facility: CLINIC | Age: 64
End: 2024-11-26
Payer: MEDICARE

## 2024-11-26 ENCOUNTER — MEDICAL CORRESPONDENCE (OUTPATIENT)
Dept: HEALTH INFORMATION MANAGEMENT | Facility: CLINIC | Age: 64
End: 2024-11-26

## 2024-11-26 VITALS — DIASTOLIC BLOOD PRESSURE: 60 MMHG | BODY MASS INDEX: 23.01 KG/M2 | SYSTOLIC BLOOD PRESSURE: 100 MMHG | WEIGHT: 168 LBS

## 2024-11-26 DIAGNOSIS — I73.9 PAD (PERIPHERAL ARTERY DISEASE) (H): ICD-10-CM

## 2024-11-26 DIAGNOSIS — Z98.890 POST-OPERATIVE STATE: ICD-10-CM

## 2024-11-26 DIAGNOSIS — E11.42 DIABETIC POLYNEUROPATHY ASSOCIATED WITH TYPE 2 DIABETES MELLITUS (H): Primary | ICD-10-CM

## 2024-11-26 DIAGNOSIS — S98.112A AMPUTATION OF LEFT GREAT TOE (H): ICD-10-CM

## 2024-11-26 PROCEDURE — 99213 OFFICE O/P EST LOW 20 MIN: CPT | Performed by: PODIATRIST

## 2024-11-26 NOTE — LETTER
11/26/2024      Arnulfo Pritchett  [address] [facility] [address]      Dear Colleague,    Thank you for referring your patient, Arnulfo Pritchett, to the [facility]. Please see a copy of my visit note below.    Podiatry / Foot and Ankle Surgery Progress Note    November 26, 2024    Subject: Patient was seen for 1 month s/p of left great toe.  Pain to the foot.  Denies fever, nausea, vomiting.    Objective:  Vitals: /60   Wt 76.2 kg (168 lb)   BMI 23.01 kg/m    A1C: 5.7 (10/16/2024)    General:  Patient is alert and orientated.  NAD.    Vascular:  DP and PT pulses are palpable.  No edema or varicosities noted.  CFT's < 3secs.  Skin temp is normal.    Neuro:  Light and gross touch sensation intact to digits, dorsum, and plantar aspects of the feet.    Derm:  Skin is supple.  No rashes, lesions, or ulcerations noted.    Musculoskeletal:  left great toe is now amputated.     Assessment:    Diabetic polyneuropathy associated with type 2 diabetes mellitus (H)  Amputation of left great toe (H)  PAD (peripheral artery disease) (H)  Post-operative state      Medical Decision Making/Plan: 0 removed.  Patient can shower and get the foot wet.  We will have him pat to the left great toe dry and apply iodine to the area daily.  They can lotion the remaining foot daily.  He can go back to regular socks and shoes.  Patient can ambulate as tolerated.  We will have him follow-up as needed.    Questions were answered to patient satisfaction and he will call further questions or concerns.      Patient Risk Factor:  Patient is a low risk factor for infection.     Haley Joseph DPM, Podiatry/Foot and Ankle Surgery            Again, thank you for allowing me to participate in the care of your patient.        Sincerely,        Haley Joseph DPM, Podiatry/Foot and Ankle Surgery  
(2) good, crying

## 2024-11-26 NOTE — PATIENT INSTRUCTIONS
Thank you for choosing [facility] Podiatry / Foot & Ankle Surgery!    DR TRONCOSO'S CLINIC:  [facility]   [address]   [place] [address]  [phone]    (Tues, Wed, Thur am, Fri pm)     [facility]  [address], [place] [address]  [phone]  (Every other Friday morning)    [facility]  [address] [place] [id number] [phone]  (Every other Friday)     TRIAGE LINE: [phone]  APPOINTMENTS: [phone]  RADIOLOGY: [phone]  SET UP SURGERY: [phone]  PHYSICAL THERAPY: [phone]   FAX NUMBER: [phone]  BILLING QUESTIONS: [phone]       Follow up: As needed

## 2024-11-26 NOTE — PROGRESS NOTES
Podiatry / Foot and Ankle Surgery Progress Note    November 26, 2024    Subject: Patient was seen for 1 month s/p of left great toe.  Pain to the foot.  Denies fever, nausea, vomiting.    Objective:  Vitals: /60   Wt 76.2 kg (168 lb)   BMI 23.01 kg/m    A1C: 5.7 (10/16/2024)    General:  Patient is alert and orientated.  NAD.    Vascular:  DP and PT pulses are palpable.  No edema or varicosities noted.  CFT's < 3secs.  Skin temp is normal.    Neuro:  Light and gross touch sensation intact to digits, dorsum, and plantar aspects of the feet.    Derm:  Skin is supple.  No rashes, lesions, or ulcerations noted.    Musculoskeletal:  left great toe is now amputated.     Assessment:    Diabetic polyneuropathy associated with type 2 diabetes mellitus (H)  Amputation of left great toe (H)  PAD (peripheral artery disease) (H)  Post-operative state      Medical Decision Making/Plan: 0 removed.  Patient can shower and get the foot wet.  We will have him pat to the left great toe dry and apply iodine to the area daily.  They can lotion the remaining foot daily.  He can go back to regular socks and shoes.  Patient can ambulate as tolerated.  We will have him follow-up as needed.    Questions were answered to patient satisfaction and he will call further questions or concerns.      Patient Risk Factor:  Patient is a low risk factor for infection.     Haley Joseph DPM, Podiatry/Foot and Ankle Surgery

## 2024-11-27 ENCOUNTER — APPOINTMENT (OUTPATIENT)
Dept: CARDIOLOGY | Facility: CLINIC | Age: 64
DRG: 280 | End: 2024-11-27
Attending: EMERGENCY MEDICINE
Payer: MEDICARE

## 2024-11-27 ENCOUNTER — OFFICE VISIT (OUTPATIENT)
Dept: OTHER | Facility: CLINIC | Age: 64
End: 2024-11-27
Payer: MEDICARE

## 2024-11-27 ENCOUNTER — HOSPITAL ENCOUNTER (INPATIENT)
Facility: CLINIC | Age: 64
DRG: 280 | End: 2024-11-27
Attending: EMERGENCY MEDICINE | Admitting: INTERNAL MEDICINE
Payer: MEDICARE

## 2024-11-27 ENCOUNTER — APPOINTMENT (OUTPATIENT)
Dept: GENERAL RADIOLOGY | Facility: CLINIC | Age: 64
DRG: 280 | End: 2024-11-27
Attending: EMERGENCY MEDICINE
Payer: MEDICARE

## 2024-11-27 VITALS
TEMPERATURE: 92 F | HEART RATE: 50 BPM | OXYGEN SATURATION: 100 % | SYSTOLIC BLOOD PRESSURE: 62 MMHG | DIASTOLIC BLOOD PRESSURE: 48 MMHG

## 2024-11-27 DIAGNOSIS — Z79.4: ICD-10-CM

## 2024-11-27 DIAGNOSIS — I25.5 ISCHEMIC CARDIOMYOPATHY: ICD-10-CM

## 2024-11-27 DIAGNOSIS — I16.0 HYPERTENSIVE URGENCY: ICD-10-CM

## 2024-11-27 DIAGNOSIS — I48.91 ATRIAL FIBRILLATION (H): ICD-10-CM

## 2024-11-27 DIAGNOSIS — E11.9 TYPE 2 DIABETES MELLITUS (H): ICD-10-CM

## 2024-11-27 DIAGNOSIS — I25.10 CORONARY ARTERY DISEASE INVOLVING NATIVE CORONARY ARTERY OF NATIVE HEART WITHOUT ANGINA PECTORIS: ICD-10-CM

## 2024-11-27 DIAGNOSIS — I63.9 ISCHEMIC STROKE (H): ICD-10-CM

## 2024-11-27 DIAGNOSIS — I12.0 TYPE 2 DM WITH HYPERTENSION AND ESRD ON DIALYSIS (H): ICD-10-CM

## 2024-11-27 DIAGNOSIS — N18.6: ICD-10-CM

## 2024-11-27 DIAGNOSIS — I50.22 CHRONIC SYSTOLIC CONGESTIVE HEART FAILURE (H): ICD-10-CM

## 2024-11-27 DIAGNOSIS — I25.119 CORONARY ARTERY DISEASE INVOLVING NATIVE CORONARY ARTERY OF NATIVE HEART WITH ANGINA PECTORIS (H): ICD-10-CM

## 2024-11-27 DIAGNOSIS — Z99.2 TYPE 2 DM WITH HYPERTENSION AND ESRD ON DIALYSIS (H): ICD-10-CM

## 2024-11-27 DIAGNOSIS — N18.4 CHRONIC KIDNEY DISEASE, STAGE IV (SEVERE) (H): ICD-10-CM

## 2024-11-27 DIAGNOSIS — E78.5 DYSLIPIDEMIA: ICD-10-CM

## 2024-11-27 DIAGNOSIS — E11.22 TYPE 2 DM WITH HYPERTENSION AND ESRD ON DIALYSIS (H): ICD-10-CM

## 2024-11-27 DIAGNOSIS — I73.9 PAD (PERIPHERAL ARTERY DISEASE) (H): Primary | ICD-10-CM

## 2024-11-27 DIAGNOSIS — N18.6 TYPE 2 DM WITH HYPERTENSION AND ESRD ON DIALYSIS (H): ICD-10-CM

## 2024-11-27 DIAGNOSIS — I21.4 NSTEMI (NON-ST ELEVATED MYOCARDIAL INFARCTION) (H): ICD-10-CM

## 2024-11-27 DIAGNOSIS — I24.9 ACS (ACUTE CORONARY SYNDROME) (H): Primary | ICD-10-CM

## 2024-11-27 DIAGNOSIS — L97.529: ICD-10-CM

## 2024-11-27 DIAGNOSIS — E87.5 HYPERKALEMIA: ICD-10-CM

## 2024-11-27 DIAGNOSIS — Z99.2: ICD-10-CM

## 2024-11-27 DIAGNOSIS — E08.22: ICD-10-CM

## 2024-11-27 DIAGNOSIS — Z79.01 LONG TERM CURRENT USE OF ANTICOAGULANT THERAPY: ICD-10-CM

## 2024-11-27 LAB
ABO/RH(D): NORMAL
ANION GAP SERPL CALCULATED.3IONS-SCNC: 14 MMOL/L (ref 7–15)
ANTIBODY SCREEN: NEGATIVE
APTT PPP: 32 SECONDS (ref 22–38)
ATRIAL RATE - MUSE: 104 BPM
ATRIAL RATE - MUSE: 93 BPM
BASOPHILS # BLD AUTO: 0.1 10E3/UL (ref 0–0.2)
BASOPHILS NFR BLD AUTO: 1 %
BI-PLANE LVEF ECHO: NORMAL
BLD PROD TYP BPU: NORMAL
BLOOD COMPONENT TYPE: NORMAL
BUN SERPL-MCNC: 41 MG/DL (ref 8–23)
CALCIUM SERPL-MCNC: 9.2 MG/DL (ref 8.8–10.4)
CHLORIDE SERPL-SCNC: 89 MMOL/L (ref 98–107)
CODING SYSTEM: NORMAL
CREAT SERPL-MCNC: 7.58 MG/DL (ref 0.67–1.17)
CROSSMATCH: NORMAL
CRP SERPL-MCNC: 13.32 MG/L
DIASTOLIC BLOOD PRESSURE - MUSE: NORMAL MMHG
DIASTOLIC BLOOD PRESSURE - MUSE: NORMAL MMHG
EGFRCR SERPLBLD CKD-EPI 2021: 7 ML/MIN/1.73M2
EOSINOPHIL # BLD AUTO: 0.4 10E3/UL (ref 0–0.7)
EOSINOPHIL NFR BLD AUTO: 5 %
ERYTHROCYTE [DISTWIDTH] IN BLOOD BY AUTOMATED COUNT: 17.2 % (ref 10–15)
FLUAV RNA SPEC QL NAA+PROBE: NEGATIVE
FLUBV RNA RESP QL NAA+PROBE: NEGATIVE
GLUCOSE BLDC GLUCOMTR-MCNC: 127 MG/DL (ref 70–99)
GLUCOSE BLDC GLUCOMTR-MCNC: 65 MG/DL (ref 70–99)
GLUCOSE BLDC GLUCOMTR-MCNC: 74 MG/DL (ref 70–99)
GLUCOSE SERPL-MCNC: 150 MG/DL (ref 70–99)
HCO3 SERPL-SCNC: 26 MMOL/L (ref 22–29)
HCT VFR BLD AUTO: 35.6 % (ref 40–53)
HGB BLD-MCNC: 11.4 G/DL (ref 13.3–17.7)
IMM GRANULOCYTES # BLD: 0 10E3/UL
IMM GRANULOCYTES NFR BLD: 1 %
INR PPP: 1.55 (ref 0.85–1.15)
INTERPRETATION ECG - MUSE: NORMAL
INTERPRETATION ECG - MUSE: NORMAL
ISSUE DATE AND TIME: NORMAL
LACTATE SERPL-SCNC: 2.1 MMOL/L (ref 0.7–2)
LACTATE SERPL-SCNC: 3.6 MMOL/L (ref 0.7–2)
LYMPHOCYTES # BLD AUTO: 1.6 10E3/UL (ref 0.8–5.3)
LYMPHOCYTES NFR BLD AUTO: 21 %
MAGNESIUM SERPL-MCNC: 1.8 MG/DL (ref 1.7–2.3)
MCH RBC QN AUTO: 32 PG (ref 26.5–33)
MCHC RBC AUTO-ENTMCNC: 32 G/DL (ref 31.5–36.5)
MCV RBC AUTO: 100 FL (ref 78–100)
MONOCYTES # BLD AUTO: 0.6 10E3/UL (ref 0–1.3)
MONOCYTES NFR BLD AUTO: 8 %
NEUTROPHILS # BLD AUTO: 4.9 10E3/UL (ref 1.6–8.3)
NEUTROPHILS NFR BLD AUTO: 65 %
NRBC # BLD AUTO: 0 10E3/UL
NRBC BLD AUTO-RTO: 0 /100
NT-PROBNP SERPL-MCNC: ABNORMAL PG/ML (ref 0–900)
P AXIS - MUSE: 31 DEGREES
P AXIS - MUSE: 54 DEGREES
PLATELET # BLD AUTO: 395 10E3/UL (ref 150–450)
POTASSIUM SERPL-SCNC: 4.9 MMOL/L (ref 3.4–5.3)
PR INTERVAL - MUSE: 158 MS
PR INTERVAL - MUSE: 192 MS
PROCALCITONIN SERPL IA-MCNC: 0.32 NG/ML
QRS DURATION - MUSE: 92 MS
QRS DURATION - MUSE: 94 MS
QT - MUSE: 374 MS
QT - MUSE: 388 MS
QTC - MUSE: 482 MS
QTC - MUSE: 491 MS
R AXIS - MUSE: -54 DEGREES
R AXIS - MUSE: -69 DEGREES
RBC # BLD AUTO: 3.56 10E6/UL (ref 4.4–5.9)
RSV RNA SPEC NAA+PROBE: NEGATIVE
SARS-COV-2 RNA RESP QL NAA+PROBE: NEGATIVE
SODIUM SERPL-SCNC: 129 MMOL/L (ref 135–145)
SPECIMEN EXPIRATION DATE: NORMAL
SYSTOLIC BLOOD PRESSURE - MUSE: NORMAL MMHG
SYSTOLIC BLOOD PRESSURE - MUSE: NORMAL MMHG
T AXIS - MUSE: 90 DEGREES
T AXIS - MUSE: 99 DEGREES
T4 FREE SERPL-MCNC: 1.38 NG/DL (ref 0.9–1.7)
TROPONIN T SERPL HS-MCNC: 503 NG/L
TROPONIN T SERPL HS-MCNC: 564 NG/L
TSH SERPL DL<=0.005 MIU/L-ACNC: 6.45 UIU/ML (ref 0.3–4.2)
UFH PPP CHRO-ACNC: 0.1 IU/ML
UNIT ABO/RH: NORMAL
UNIT NUMBER: NORMAL
UNIT STATUS: NORMAL
UNIT TYPE ISBT: 9500
VENTRICULAR RATE- MUSE: 104 BPM
VENTRICULAR RATE- MUSE: 93 BPM
WBC # BLD AUTO: 7.7 10E3/UL (ref 4–11)

## 2024-11-27 PROCEDURE — 250N000011 HC RX IP 250 OP 636: Performed by: INTERNAL MEDICINE

## 2024-11-27 PROCEDURE — 86850 RBC ANTIBODY SCREEN: CPT | Performed by: EMERGENCY MEDICINE

## 2024-11-27 PROCEDURE — 86140 C-REACTIVE PROTEIN: CPT | Performed by: INTERNAL MEDICINE

## 2024-11-27 PROCEDURE — 93458 L HRT ARTERY/VENTRICLE ANGIO: CPT | Performed by: INTERNAL MEDICINE

## 2024-11-27 PROCEDURE — 84145 PROCALCITONIN (PCT): CPT | Performed by: INTERNAL MEDICINE

## 2024-11-27 PROCEDURE — 90999 UNLISTED DIALYSIS PROCEDURE: CPT

## 2024-11-27 PROCEDURE — 83735 ASSAY OF MAGNESIUM: CPT | Performed by: EMERGENCY MEDICINE

## 2024-11-27 PROCEDURE — 99152 MOD SED SAME PHYS/QHP 5/>YRS: CPT | Mod: GC | Performed by: INTERNAL MEDICINE

## 2024-11-27 PROCEDURE — 99291 CRITICAL CARE FIRST HOUR: CPT | Mod: 25 | Performed by: PHYSICIAN ASSISTANT

## 2024-11-27 PROCEDURE — 250N000009 HC RX 250: Performed by: INTERNAL MEDICINE

## 2024-11-27 PROCEDURE — 86900 BLOOD TYPING SEROLOGIC ABO: CPT | Performed by: EMERGENCY MEDICINE

## 2024-11-27 PROCEDURE — 99231 SBSQ HOSP IP/OBS SF/LOW 25: CPT | Mod: 24 | Performed by: SURGERY

## 2024-11-27 PROCEDURE — P9016 RBC LEUKOCYTES REDUCED: HCPCS

## 2024-11-27 PROCEDURE — 96365 THER/PROPH/DIAG IV INF INIT: CPT

## 2024-11-27 PROCEDURE — 93005 ELECTROCARDIOGRAM TRACING: CPT

## 2024-11-27 PROCEDURE — 87040 BLOOD CULTURE FOR BACTERIA: CPT | Performed by: EMERGENCY MEDICINE

## 2024-11-27 PROCEDURE — 93458 L HRT ARTERY/VENTRICLE ANGIO: CPT | Mod: 26 | Performed by: INTERNAL MEDICINE

## 2024-11-27 PROCEDURE — 99152 MOD SED SAME PHYS/QHP 5/>YRS: CPT | Performed by: INTERNAL MEDICINE

## 2024-11-27 PROCEDURE — 258N000003 HC RX IP 258 OP 636: Performed by: EMERGENCY MEDICINE

## 2024-11-27 PROCEDURE — 210N000001 HC R&B IMCU HEART CARE

## 2024-11-27 PROCEDURE — 96367 TX/PROPH/DG ADDL SEQ IV INF: CPT

## 2024-11-27 PROCEDURE — 83880 ASSAY OF NATRIURETIC PEPTIDE: CPT | Performed by: EMERGENCY MEDICINE

## 2024-11-27 PROCEDURE — B2111ZZ FLUOROSCOPY OF MULTIPLE CORONARY ARTERIES USING LOW OSMOLAR CONTRAST: ICD-10-PCS | Performed by: INTERNAL MEDICINE

## 2024-11-27 PROCEDURE — 93325 DOPPLER ECHO COLOR FLOW MAPG: CPT | Mod: 26 | Performed by: INTERNAL MEDICINE

## 2024-11-27 PROCEDURE — G0463 HOSPITAL OUTPT CLINIC VISIT: HCPCS

## 2024-11-27 PROCEDURE — C1769 GUIDE WIRE: HCPCS | Performed by: INTERNAL MEDICINE

## 2024-11-27 PROCEDURE — 36415 COLL VENOUS BLD VENIPUNCTURE: CPT | Performed by: EMERGENCY MEDICINE

## 2024-11-27 PROCEDURE — 93321 DOPPLER ECHO F-UP/LMTD STD: CPT | Mod: 26 | Performed by: INTERNAL MEDICINE

## 2024-11-27 PROCEDURE — 999N000208 ECHOCARDIOGRAM LIMITED

## 2024-11-27 PROCEDURE — 99223 1ST HOSP IP/OBS HIGH 75: CPT | Mod: AI | Performed by: INTERNAL MEDICINE

## 2024-11-27 PROCEDURE — 93308 TTE F-UP OR LMTD: CPT | Mod: 26 | Performed by: INTERNAL MEDICINE

## 2024-11-27 PROCEDURE — 86923 COMPATIBILITY TEST ELECTRIC: CPT

## 2024-11-27 PROCEDURE — 85520 HEPARIN ASSAY: CPT | Performed by: EMERGENCY MEDICINE

## 2024-11-27 PROCEDURE — 85730 THROMBOPLASTIN TIME PARTIAL: CPT | Performed by: EMERGENCY MEDICINE

## 2024-11-27 PROCEDURE — 272N000001 HC OR GENERAL SUPPLY STERILE: Performed by: INTERNAL MEDICINE

## 2024-11-27 PROCEDURE — 255N000002 HC RX 255 OP 636: Performed by: EMERGENCY MEDICINE

## 2024-11-27 PROCEDURE — C1894 INTRO/SHEATH, NON-LASER: HCPCS | Performed by: INTERNAL MEDICINE

## 2024-11-27 PROCEDURE — 87637 SARSCOV2&INF A&B&RSV AMP PRB: CPT | Performed by: EMERGENCY MEDICINE

## 2024-11-27 PROCEDURE — 250N000013 HC RX MED GY IP 250 OP 250 PS 637: Performed by: INTERNAL MEDICINE

## 2024-11-27 PROCEDURE — 99223 1ST HOSP IP/OBS HIGH 75: CPT | Performed by: STUDENT IN AN ORGANIZED HEALTH CARE EDUCATION/TRAINING PROGRAM

## 2024-11-27 PROCEDURE — C1887 CATHETER, GUIDING: HCPCS | Performed by: INTERNAL MEDICINE

## 2024-11-27 PROCEDURE — 258N000003 HC RX IP 258 OP 636: Performed by: HOSPITALIST

## 2024-11-27 PROCEDURE — 84443 ASSAY THYROID STIM HORMONE: CPT | Performed by: EMERGENCY MEDICINE

## 2024-11-27 PROCEDURE — 84484 ASSAY OF TROPONIN QUANT: CPT | Performed by: EMERGENCY MEDICINE

## 2024-11-27 PROCEDURE — 84439 ASSAY OF FREE THYROXINE: CPT | Performed by: EMERGENCY MEDICINE

## 2024-11-27 PROCEDURE — 99291 CRITICAL CARE FIRST HOUR: CPT | Mod: 25

## 2024-11-27 PROCEDURE — 99153 MOD SED SAME PHYS/QHP EA: CPT | Performed by: INTERNAL MEDICINE

## 2024-11-27 PROCEDURE — 85004 AUTOMATED DIFF WBC COUNT: CPT | Performed by: EMERGENCY MEDICINE

## 2024-11-27 PROCEDURE — 71045 X-RAY EXAM CHEST 1 VIEW: CPT

## 2024-11-27 PROCEDURE — 85610 PROTHROMBIN TIME: CPT | Performed by: EMERGENCY MEDICINE

## 2024-11-27 PROCEDURE — 83605 ASSAY OF LACTIC ACID: CPT | Performed by: EMERGENCY MEDICINE

## 2024-11-27 PROCEDURE — 250N000011 HC RX IP 250 OP 636: Performed by: EMERGENCY MEDICINE

## 2024-11-27 PROCEDURE — 80048 BASIC METABOLIC PNL TOTAL CA: CPT | Performed by: EMERGENCY MEDICINE

## 2024-11-27 RX ORDER — NALOXONE HYDROCHLORIDE 0.4 MG/ML
0.2 INJECTION, SOLUTION INTRAMUSCULAR; INTRAVENOUS; SUBCUTANEOUS
Status: DISCONTINUED | OUTPATIENT
Start: 2024-11-27 | End: 2024-11-28

## 2024-11-27 RX ORDER — ATROPINE SULFATE 0.1 MG/ML
0.5 INJECTION INTRAVENOUS
Status: DISCONTINUED | OUTPATIENT
Start: 2024-11-27 | End: 2024-11-28

## 2024-11-27 RX ORDER — DIPHENHYDRAMINE HYDROCHLORIDE 50 MG/ML
INJECTION INTRAMUSCULAR; INTRAVENOUS
Status: DISCONTINUED | OUTPATIENT
Start: 2024-11-27 | End: 2024-11-27 | Stop reason: HOSPADM

## 2024-11-27 RX ORDER — POLYETHYLENE GLYCOL 3350 17 G/17G
1 POWDER, FOR SOLUTION ORAL DAILY
COMMUNITY

## 2024-11-27 RX ORDER — NITROGLYCERIN 5 MG/ML
VIAL (ML) INTRAVENOUS
Status: DISCONTINUED | OUTPATIENT
Start: 2024-11-27 | End: 2024-11-27 | Stop reason: HOSPADM

## 2024-11-27 RX ORDER — ASPIRIN 81 MG/1
243 TABLET, CHEWABLE ORAL ONCE
Status: DISCONTINUED | OUTPATIENT
Start: 2024-11-27 | End: 2024-11-27

## 2024-11-27 RX ORDER — CLOPIDOGREL BISULFATE 75 MG/1
75 TABLET ORAL EVERY EVENING
Status: DISCONTINUED | OUTPATIENT
Start: 2024-11-27 | End: 2024-12-03 | Stop reason: HOSPADM

## 2024-11-27 RX ORDER — OXYCODONE HYDROCHLORIDE 5 MG/1
5 TABLET ORAL EVERY 6 HOURS PRN
Status: DISCONTINUED | OUTPATIENT
Start: 2024-11-27 | End: 2024-11-27

## 2024-11-27 RX ORDER — NICOTINE POLACRILEX 4 MG
15-30 LOZENGE BUCCAL
Status: DISCONTINUED | OUTPATIENT
Start: 2024-11-27 | End: 2024-12-03 | Stop reason: HOSPADM

## 2024-11-27 RX ORDER — ACETAMINOPHEN 325 MG/1
650 TABLET ORAL EVERY 4 HOURS PRN
Status: DISCONTINUED | OUTPATIENT
Start: 2024-11-27 | End: 2024-11-27

## 2024-11-27 RX ORDER — HYDROMORPHONE HCL IN WATER/PF 6 MG/30 ML
0.4 PATIENT CONTROLLED ANALGESIA SYRINGE INTRAVENOUS
Status: DISCONTINUED | OUTPATIENT
Start: 2024-11-27 | End: 2024-12-03 | Stop reason: HOSPADM

## 2024-11-27 RX ORDER — DOBUTAMINE HYDROCHLORIDE 200 MG/100ML
5 INJECTION INTRAVENOUS CONTINUOUS
Status: DISCONTINUED | OUTPATIENT
Start: 2024-11-27 | End: 2024-11-28

## 2024-11-27 RX ORDER — CINACALCET 30 MG/1
60 TABLET, FILM COATED ORAL
Status: DISCONTINUED | OUTPATIENT
Start: 2024-11-27 | End: 2024-12-03 | Stop reason: HOSPADM

## 2024-11-27 RX ORDER — PIPERACILLIN SODIUM, TAZOBACTAM SODIUM 3; .375 G/15ML; G/15ML
3.38 INJECTION, POWDER, LYOPHILIZED, FOR SOLUTION INTRAVENOUS ONCE
Status: COMPLETED | OUTPATIENT
Start: 2024-11-27 | End: 2024-11-27

## 2024-11-27 RX ORDER — AMOXICILLIN 250 MG
1 CAPSULE ORAL 2 TIMES DAILY PRN
Status: DISCONTINUED | OUTPATIENT
Start: 2024-11-27 | End: 2024-12-03 | Stop reason: HOSPADM

## 2024-11-27 RX ORDER — LIDOCAINE 40 MG/G
CREAM TOPICAL
Status: DISCONTINUED | OUTPATIENT
Start: 2024-11-27 | End: 2024-11-27

## 2024-11-27 RX ORDER — IOPAMIDOL 755 MG/ML
INJECTION, SOLUTION INTRAVASCULAR
Status: DISCONTINUED | OUTPATIENT
Start: 2024-11-27 | End: 2024-11-27 | Stop reason: HOSPADM

## 2024-11-27 RX ORDER — FLUMAZENIL 0.1 MG/ML
0.2 INJECTION, SOLUTION INTRAVENOUS
Status: DISCONTINUED | OUTPATIENT
Start: 2024-11-27 | End: 2024-11-28

## 2024-11-27 RX ORDER — VERAPAMIL HYDROCHLORIDE 2.5 MG/ML
INJECTION, SOLUTION INTRAVENOUS
Status: DISCONTINUED | OUTPATIENT
Start: 2024-11-27 | End: 2024-11-27 | Stop reason: HOSPADM

## 2024-11-27 RX ORDER — ACETAMINOPHEN 325 MG/1
650 TABLET ORAL EVERY 4 HOURS PRN
Status: DISCONTINUED | OUTPATIENT
Start: 2024-11-27 | End: 2024-12-03 | Stop reason: HOSPADM

## 2024-11-27 RX ORDER — GENTAMICIN SULFATE 1 MG/G
CREAM TOPICAL DAILY PRN
Status: DISCONTINUED | OUTPATIENT
Start: 2024-11-27 | End: 2024-11-28

## 2024-11-27 RX ORDER — SEVELAMER CARBONATE FOR ORAL SUSPENSION 800 MG/1
0.8 POWDER, FOR SUSPENSION ORAL
Status: DISCONTINUED | OUTPATIENT
Start: 2024-11-27 | End: 2024-11-28

## 2024-11-27 RX ORDER — FENTANYL CITRATE 50 UG/ML
25 INJECTION, SOLUTION INTRAMUSCULAR; INTRAVENOUS
Status: DISCONTINUED | OUTPATIENT
Start: 2024-11-27 | End: 2024-12-03

## 2024-11-27 RX ORDER — OXYCODONE HYDROCHLORIDE 5 MG/1
10 TABLET ORAL EVERY 4 HOURS PRN
Status: DISCONTINUED | OUTPATIENT
Start: 2024-11-27 | End: 2024-12-03 | Stop reason: HOSPADM

## 2024-11-27 RX ORDER — POTASSIUM CHLORIDE 1500 MG/1
20 TABLET, EXTENDED RELEASE ORAL
Status: DISCONTINUED | OUTPATIENT
Start: 2024-11-27 | End: 2024-12-03 | Stop reason: HOSPADM

## 2024-11-27 RX ORDER — LORAZEPAM 0.5 MG/1
0.5 TABLET ORAL
Status: DISCONTINUED | OUTPATIENT
Start: 2024-11-27 | End: 2024-12-03 | Stop reason: HOSPADM

## 2024-11-27 RX ORDER — LIDOCAINE 40 MG/G
CREAM TOPICAL
Status: DISCONTINUED | OUTPATIENT
Start: 2024-11-27 | End: 2024-12-03 | Stop reason: HOSPADM

## 2024-11-27 RX ORDER — WARFARIN SODIUM 3 MG/1
3 TABLET ORAL
COMMUNITY

## 2024-11-27 RX ORDER — ATORVASTATIN CALCIUM 40 MG/1
40 TABLET, FILM COATED ORAL AT BEDTIME
Status: DISCONTINUED | OUTPATIENT
Start: 2024-11-27 | End: 2024-12-03 | Stop reason: HOSPADM

## 2024-11-27 RX ORDER — PANTOPRAZOLE SODIUM 40 MG/1
40 TABLET, DELAYED RELEASE ORAL
Status: DISCONTINUED | OUTPATIENT
Start: 2024-11-28 | End: 2024-12-03 | Stop reason: HOSPADM

## 2024-11-27 RX ORDER — CALCIUM CARBONATE 500 MG/1
1000 TABLET, CHEWABLE ORAL 4 TIMES DAILY PRN
Status: DISCONTINUED | OUTPATIENT
Start: 2024-11-27 | End: 2024-12-03 | Stop reason: HOSPADM

## 2024-11-27 RX ORDER — WARFARIN SODIUM 4 MG/1
4 TABLET ORAL
COMMUNITY

## 2024-11-27 RX ORDER — HEPARIN SODIUM 1000 [USP'U]/ML
INJECTION, SOLUTION INTRAVENOUS; SUBCUTANEOUS
Status: DISCONTINUED | OUTPATIENT
Start: 2024-11-27 | End: 2024-11-27 | Stop reason: HOSPADM

## 2024-11-27 RX ORDER — ONDANSETRON 2 MG/ML
4 INJECTION INTRAMUSCULAR; INTRAVENOUS EVERY 6 HOURS PRN
Status: DISCONTINUED | OUTPATIENT
Start: 2024-11-27 | End: 2024-12-03 | Stop reason: HOSPADM

## 2024-11-27 RX ORDER — SODIUM CHLORIDE 9 MG/ML
75 INJECTION, SOLUTION INTRAVENOUS CONTINUOUS
Status: ACTIVE | OUTPATIENT
Start: 2024-11-27 | End: 2024-11-27

## 2024-11-27 RX ORDER — CALCITRIOL 0.25 UG/1
0.25 CAPSULE, LIQUID FILLED ORAL AT BEDTIME
Status: DISCONTINUED | OUTPATIENT
Start: 2024-11-27 | End: 2024-12-03 | Stop reason: HOSPADM

## 2024-11-27 RX ORDER — HYDROMORPHONE HCL IN WATER/PF 6 MG/30 ML
0.2 PATIENT CONTROLLED ANALGESIA SYRINGE INTRAVENOUS
Status: DISCONTINUED | OUTPATIENT
Start: 2024-11-27 | End: 2024-12-03 | Stop reason: HOSPADM

## 2024-11-27 RX ORDER — OXYCODONE HYDROCHLORIDE 5 MG/1
5 TABLET ORAL EVERY 4 HOURS PRN
Status: DISCONTINUED | OUTPATIENT
Start: 2024-11-27 | End: 2024-12-03 | Stop reason: HOSPADM

## 2024-11-27 RX ORDER — ACETAMINOPHEN 650 MG/1
650 SUPPOSITORY RECTAL EVERY 4 HOURS PRN
Status: DISCONTINUED | OUTPATIENT
Start: 2024-11-27 | End: 2024-12-03 | Stop reason: HOSPADM

## 2024-11-27 RX ORDER — AMOXICILLIN 250 MG
1 CAPSULE ORAL DAILY PRN
COMMUNITY

## 2024-11-27 RX ORDER — DEXTROSE MONOHYDRATE 25 G/50ML
25-50 INJECTION, SOLUTION INTRAVENOUS
Status: DISCONTINUED | OUTPATIENT
Start: 2024-11-27 | End: 2024-12-03 | Stop reason: HOSPADM

## 2024-11-27 RX ORDER — FENTANYL CITRATE 50 UG/ML
INJECTION, SOLUTION INTRAMUSCULAR; INTRAVENOUS
Status: DISCONTINUED | OUTPATIENT
Start: 2024-11-27 | End: 2024-11-27 | Stop reason: HOSPADM

## 2024-11-27 RX ORDER — NALOXONE HYDROCHLORIDE 0.4 MG/ML
0.4 INJECTION, SOLUTION INTRAMUSCULAR; INTRAVENOUS; SUBCUTANEOUS
Status: DISCONTINUED | OUTPATIENT
Start: 2024-11-27 | End: 2024-11-28

## 2024-11-27 RX ORDER — ALLOPURINOL 100 MG/1
200 TABLET ORAL EVERY EVENING
Status: DISCONTINUED | OUTPATIENT
Start: 2024-11-27 | End: 2024-12-03 | Stop reason: HOSPADM

## 2024-11-27 RX ORDER — ONDANSETRON 4 MG/1
4 TABLET, ORALLY DISINTEGRATING ORAL EVERY 6 HOURS PRN
Status: DISCONTINUED | OUTPATIENT
Start: 2024-11-27 | End: 2024-12-03 | Stop reason: HOSPADM

## 2024-11-27 RX ORDER — ASPIRIN 325 MG
325 TABLET ORAL ONCE
Status: DISCONTINUED | OUTPATIENT
Start: 2024-11-27 | End: 2024-11-27

## 2024-11-27 RX ORDER — LORAZEPAM 2 MG/ML
0.5 INJECTION INTRAMUSCULAR
Status: DISCONTINUED | OUTPATIENT
Start: 2024-11-27 | End: 2024-12-03 | Stop reason: HOSPADM

## 2024-11-27 RX ORDER — AMOXICILLIN 250 MG
2 CAPSULE ORAL 2 TIMES DAILY PRN
Status: DISCONTINUED | OUTPATIENT
Start: 2024-11-27 | End: 2024-12-03 | Stop reason: HOSPADM

## 2024-11-27 RX ORDER — HEPARIN SODIUM 10000 [USP'U]/100ML
0-5000 INJECTION, SOLUTION INTRAVENOUS CONTINUOUS
Status: DISCONTINUED | OUTPATIENT
Start: 2024-11-27 | End: 2024-11-27

## 2024-11-27 RX ADMIN — PERFLUTREN 10 ML: 6.52 INJECTION, SUSPENSION INTRAVENOUS at 12:32

## 2024-11-27 RX ADMIN — DOBUTAMINE HYDROCHLORIDE 5 MCG/KG/MIN: 200 INJECTION INTRAVENOUS at 21:33

## 2024-11-27 RX ADMIN — CINACALCET 60 MG: 30 TABLET ORAL at 21:22

## 2024-11-27 RX ADMIN — ATORVASTATIN CALCIUM 40 MG: 40 TABLET, FILM COATED ORAL at 21:22

## 2024-11-27 RX ADMIN — SODIUM CHLORIDE 10 ML/HR: 9 INJECTION, SOLUTION INTRAVENOUS at 19:55

## 2024-11-27 RX ADMIN — VANCOMYCIN HYDROCHLORIDE 2000 MG: 10 INJECTION, POWDER, LYOPHILIZED, FOR SOLUTION INTRAVENOUS at 11:50

## 2024-11-27 RX ADMIN — PIPERACILLIN AND TAZOBACTAM 3.38 G: 3; .375 INJECTION, POWDER, FOR SOLUTION INTRAVENOUS at 11:24

## 2024-11-27 RX ADMIN — SEVELAMER CARBONATE 0.8 G: 800 POWDER, FOR SUSPENSION ORAL at 21:23

## 2024-11-27 RX ADMIN — CALCITRIOL CAPSULES 0.25 MCG 0.25 MCG: 0.25 CAPSULE ORAL at 21:22

## 2024-11-27 RX ADMIN — HEPARIN SODIUM 900 UNITS/HR: 10000 INJECTION, SOLUTION INTRAVENOUS at 11:54

## 2024-11-27 RX ADMIN — CLOPIDOGREL BISULFATE 75 MG: 75 TABLET ORAL at 21:22

## 2024-11-27 RX ADMIN — SODIUM CHLORIDE 1000 ML: 9 INJECTION, SOLUTION INTRAVENOUS at 11:02

## 2024-11-27 RX ADMIN — ALLOPURINOL 200 MG: 100 TABLET ORAL at 21:22

## 2024-11-27 ASSESSMENT — ACTIVITIES OF DAILY LIVING (ADL)
ADLS_ACUITY_SCORE: 58
ADLS_ACUITY_SCORE: 60
ADLS_ACUITY_SCORE: 58
ADLS_ACUITY_SCORE: 58

## 2024-11-27 NOTE — PHARMACY-ADMISSION MEDICATION HISTORY
Pharmacist Admission Medication History    Admission medication history is complete. The information provided in this note is only as accurate as the sources available at the time of the update.    Information Source(s): Facility ([facility]/) medication list/MAR via N/A    Pertinent Information:     Changes made to PTA medication list:  Added: None  Deleted: tylenol with codeien  Changed: omeprazole, warfarin    Allergies reviewed with patient and updates made in EHR: no    Medication History Completed By: Akil Aliheyder, RPH 11/27/2024 5:28 PM    PTA Med List   Medication Sig Last Dose/Taking    acetaminophen (TYLENOL) 500 MG tablet Take 1,000 mg by mouth every 8 hours as needed. Taking As Needed    allopurinol (ZYLOPRIM) 100 MG tablet Take 200 mg by mouth every evening 11/26/2024 Bedtime    atorvastatin (LIPITOR) 40 MG tablet Take 40 mg by mouth at bedtime. 11/26/2024    B Complex-C-Folic Acid (DIALYVITE) TABS Take 1 tablet by mouth daily. 11/27/2024    calcitRIOL (ROCALTROL) 0.25 MCG capsule Take 0.25 mcg by mouth at bedtime. 11/26/2024    carvedilol (COREG) 6.25 MG tablet Take 1 tablet (6.25 mg) by mouth at bedtime. 11/26/2024 Bedtime    cinacalcet (SENSIPAR) 60 MG tablet Take 60 mg by mouth. Take 1 tablet (60 mg) three times a week: Monday, Wednesday, and Friday nights 11/25/2024    clopidogrel (PLAVIX) 75 MG tablet Take 75 mg by mouth every evening. 11/26/2024    gentamicin (GARAMYCIN) 0.1 % external cream Apply topically daily as needed. Apply topically on peritoneal access site to prevent infections Taking As Needed    glucose 40 % (400 mg/mL) gel Take 15-30 g by mouth every 15 minutes as needed for low blood sugar. Taking As Needed    insulin glargine (LANTUS PEN) 100 UNIT/ML pen Inject 35 Units subcutaneously at bedtime. 11/26/2024    melatonin 5 MG tablet Take 5 mg by mouth at bedtime 11/26/2024    omeprazole (PRILOSEC) 20 MG DR capsule Take 20 mg by mouth daily. 11/27/2024    oxyCODONE (ROXICODONE) 5 MG  tablet Take 1 tablet (5 mg) by mouth every 6 hours as needed for severe pain. (Patient taking differently: Take 5 mg by mouth daily as needed for severe pain.) Taking Differently    polyethylene glycol (MIRALAX) 17 g packet Take 1 packet by mouth daily. 11/27/2024    sacubitril-valsartan (ENTRESTO) 49-51 MG per tablet Take 1 tablet by mouth 2 times daily 11/27/2024 Morning    senna-docusate (SENOKOT-S/PERICOLACE) 8.6-50 MG tablet Take 1 tablet by mouth daily as needed for constipation. Taking As Needed    SEVELAMER CARBONATE PO Take 800 mg by mouth 3 times daily (with meals) 11/27/2024 Morning    torsemide (DEMADEX) 100 MG tablet Take 100 mg by mouth every morning 11/27/2024 Morning    warfarin ANTICOAGULANT (COUMADIN) 3 MG tablet Take 3 mg by mouth five times a week. Take 3 mg Mon, Thu, Fri, Sat and Sun 11/25/2024    warfarin ANTICOAGULANT (COUMADIN) 4 MG tablet Take 4 mg by mouth twice a week. Takes 4 mg Tue and Wed 11/26/2024

## 2024-11-27 NOTE — ED PROVIDER NOTES
Emergency Department Note      History of Present Illness     Chief Complaint   Hypotension      MARVIN Pritchett is a 64 year old male on Plavix and Coumadin with history of CKD, AFIB, CHF, CAD, type 2 diabetes, STEMI, CVA, hypertension, and hyperlipidemia who presents with hypotension.  The patient has had a couple of recent left lower extremity vascular surgeries, and he was following up in clinic today after the surgery. He explains that since yesterday morning he has felt fatigued and generally unwell, and the clinic nurse found him hypotensive with pressure readings of 53/36 and 64/48. He denies chest pain, shortness of breath, black/bloody stools, fever, chills,and urinary symptoms. No history of GI bleed. The patient reports history of 3 heart attacks with 3 stents placed, and he also had a stroke in 2015.  He is on Coumadin and Plavix.  He is unsure what his most recent INR was.    Independent Historian   None    Review of External Notes   Operative note from 10/25/2024 reviewed.  The patient underwent a left distal common and proximal superficial femoral artery bypass.  10/27/2024, the patient had and angiogram of the same limb which showed an AV fistula.  The side branches of the left greater saphenous vein were ligated.    Past Medical History     Medical History and Problem List   Allergic rhinitis  Peripheral vascular disease  Anemia  CKD  Coagulation defect  Hyperlipidemia  Anaphylactic shock  Chronic gout  AFIB  Type 2 diabetes  CHF  CAD  ED  Hypertension  CVA  Left homonymous hemianopsia  TALI  Malignant neoplasm of kidney  Pleural effusion  Renal mass  Venous stasis  Pneumonia  Iron deficiency  Ischemic cardiomyopathy  STEMI  Hyperparathyroidism    Medications   Semglee  Coreg  Zyloprim  Lipitor  Rocaltrol  Sensipar  Plavix  Lantus pen  Prilosec  Roxicodone  Entresto  Demadex  Coumadin    Surgical History   Amputate left great toe   Femorotibial bypass graft (L)  CV lower extremity angiogram  (L)    Physical Exam     Patient Vitals for the past 24 hrs:   BP Temp Temp src Pulse Resp SpO2   11/27/24 1330 122/87 -- -- 90 12 100 %   11/27/24 1206 117/79 -- -- 87 -- 95 %   11/27/24 1151 122/85 -- -- 86 30 93 %   11/27/24 1136 131/87 -- -- 82 25 94 %   11/27/24 1129 118/80 -- -- 84 -- 96 %   11/27/24 1114 103/80 -- -- 93 23 --   11/27/24 1110 93/65 -- -- 97 12 --   11/27/24 1055 104/81 -- -- 98 11 --   11/27/24 1040 -- 97  F (36.1  C) Temporal -- -- --   11/27/24 1030 (!) 84/58 -- -- 107 13 --   11/27/24 1028 (!) 62/39 -- -- 102 -- --   11/27/24 1021 -- -- -- 62 26 95 %     Physical Exam  General: Laying on the ED bed  HEENT: Normocephalic, atraumatic  Cardiac: Poor peripheral perfusion, regular tachycardia  Pulm: Breathing comfortably, no accessory muscle usage, no conversational dyspnea, and lungs clear bilaterally  MSK: No bony deformities, left groin wound is open with gauze in place, no active hemorrhage  Skin: Warm and dry  Neuro: Awake and alert  Psych: Pleasant mood and affect      Diagnostics     Lab Results   Labs Ordered and Resulted from Time of ED Arrival to Time of ED Departure   INR - Abnormal       Result Value    INR 1.55 (*)    BASIC METABOLIC PANEL - Abnormal    Sodium 129 (*)     Potassium 4.9      Chloride 89 (*)     Carbon Dioxide (CO2) 26      Anion Gap 14      Urea Nitrogen 41.0 (*)     Creatinine 7.58 (*)     GFR Estimate 7 (*)     Calcium 9.2      Glucose 150 (*)    TSH WITH FREE T4 REFLEX - Abnormal    TSH 6.45 (*)    TROPONIN T, HIGH SENSITIVITY - Abnormal    Troponin T, High Sensitivity 564 (*)    NT PROBNP INPATIENT - Abnormal    N terminal Pro BNP Inpatient 18,739 (*)    LACTIC ACID WHOLE BLOOD WITH 1X REPEAT IN 2 HR WHEN >2 - Abnormal    Lactic Acid, Initial 3.6 (*)    CBC WITH PLATELETS AND DIFFERENTIAL - Abnormal    WBC Count 7.7      RBC Count 3.56 (*)     Hemoglobin 11.4 (*)     Hematocrit 35.6 (*)           MCH 32.0      MCHC 32.0      RDW 17.2 (*)     Platelet Count  395      % Neutrophils 65      % Lymphocytes 21      % Monocytes 8      % Eosinophils 5      % Basophils 1      % Immature Granulocytes 1      NRBCs per 100 WBC 0      Absolute Neutrophils 4.9      Absolute Lymphocytes 1.6      Absolute Monocytes 0.6      Absolute Eosinophils 0.4      Absolute Basophils 0.1      Absolute Immature Granulocytes 0.0      Absolute NRBCs 0.0     LACTIC ACID WHOLE BLOOD - Abnormal    Lactic Acid 2.1 (*)    PARTIAL THROMBOPLASTIN TIME - Normal    aPTT 32     MAGNESIUM - Normal    Magnesium 1.8     INFLUENZA A/B, RSV AND SARS-COV2 PCR - Normal    Influenza A PCR Negative      Influenza B PCR Negative      RSV PCR Negative      SARS CoV2 PCR Negative     T4 FREE - Normal    Free T4 1.38     LACTIC ACID WHOLE BLOOD WITH 1X REPEAT IN 2 HR WHEN >2   TROPONIN T, HIGH SENSITIVITY   TYPE AND SCREEN, ADULT    ABO/RH(D) O POS      Antibody Screen Negative      SPECIMEN EXPIRATION DATE [id number]     PREPARE RED BLOOD CELLS (UNIT)    Blood Component Type Red Blood Cells      Product Code [id number]      Unit Status Issued      Unit Number [id number]      CROSSMATCH Compatible      CODING SYSTEM [id number]      ISSUE DATE AND TIME [id number]      UNIT ABO/RH O-      UNIT TYPE ISBT 9500     BLOOD CULTURE   ABO/RH TYPE AND SCREEN       Imaging   Echocardiogram Limited   Final Result      XR Chest Port 1 View   Final Result   IMPRESSION: No acute cardiopulmonary disease.      EMERSON ROSENBERG MD            SYSTEM ID:  [id number]          EKG   ECG taken at 1033, ECG read at 1039  Sinus tachycardia  Left axis deviation  Septal infarct, age undetermined  Inferior infarct, age undetermined  Abnormal ECG   Sinus tachycardia replaced NSR as compared to prior, dated 10/22/24.  Rate 104 bpm. WY interval 158 ms. QRS duration 92 ms. QT/QTc 374/491 ms. P-R-T axes 54 -54 99.    Independent Interpretation   CXR: No infiltrate.    ED Course      Medications Administered   Medications   sodium chloride (PF) 0.9% PF  flush 3 mL (has no administration in time range)   sodium chloride (PF) 0.9% PF flush 3 mL (3 mLs Intracatheter Not Given 11/27/24 1111)   heparin 25,000 units in 0.45% NaCl 250 mL ANTICOAGULANT infusion (900 Units/hr Intravenous $New Bag 11/27/24 1154)   sodium chloride 0.9% BOLUS 1,000 mL (1,000 mLs Intravenous $New Bag 11/27/24 1102)   piperacillin-tazobactam (ZOSYN) 3.375 g vial to attach to  mL bag (0 g Intravenous Stopped 11/27/24 1150)   vancomycin (VANCOCIN) 2,000 mg in 0.9% NaCl 520 mL intermittent infusion (2,000 mg Intravenous $New Bag 11/27/24 1150)   heparin loading dose for LOW INTENSITY TREATMENT * Give BEFORE starting heparin infusion (4,550 Units Intravenous $Given 11/27/24 1153)   perflutren diluted in saline (DEFINITY) injection 10 mL (10 mLs Intravenous $Given 11/27/24 1232)   sodium chloride (PF) 0.9% PF flush 10 mL (10 mLs Intravenous $Given 11/27/24 1232)         Discussion of Management   1137 I spoke with Dr. Gray, vascular surgery, regarding the patient's presentation, findings, and plan of care. Vascular will come to the ED later in the day for an evaluation.  1212 I spoke with Dr. Gray again. Plan to consult with cardiology as the patient is cleared from a vascular standpoint.  1237 I spoke with Dr. Spears, cardiology, regarding the patient's presentation, findings, and plan of care. Plan to admit to the hospital service for further care.  1305 I spoke with Dr. Glover, hospitalist, who accepts the patient.    ED Course   ED Course as of 11/27/24 1351   Wed Nov 27, 2024   1025 I obtained history and examined the patient as noted above   1056 I rechecked the patient and explained findings.       Additional Documentation  None    Medical Decision Making / Diagnosis     CMS Diagnoses: IV Antibiotics given and/or elevated Lactate of 3.6 and no sepsis note found - Delete this reminder and enter the sepsis note or '.edcms' before signing chart.>>>None    MIPS       None    TANYA AGUILAR  Shreyas is a 64 year old male who presents with fatigue and hypotension from the vascular surgery clinic.  The patient is anticoagulated on warfarin and also on Plavix.  He reports persistent oozing from the left groin wound since surgery a month ago.  He is pale and tachycardic.  EKG is nonischemic.  With the above presentation and unstable blood pressure, 2 units of uncrossed matched packed red blood cells were ordered.  I discussed this with the patient he is amenable to transfusion.  Lab work today does not show acute anemia, and with the prolonged oozing on history as opposed to a rapid acute episode of bleeding I find blood loss anemia to be less likely.  The patient therefore only received the first unit of PRBCs.  Lactic acid is elevated which raises suspicion for sepsis.  I discussed the patient with vascular surgery and they came to see the patient in the ED.  States the patient's wound looks much better and they have a low suspicion for infection at this time.  Contrasted CT scan is unfortunately contraindicated due to the patient's baseline poor renal function.  Lab work was significant for an elevated troponin.  Given the patient's vascular history, overall picture is concerning for an NSTEMI.  He was placed on a heparin drip, cleared to do so by vascular surgery.  Discussed the patient with cardiology, no plans for cardiac catheterization at this time, rather trend troponins, echocardiogram.  Patient admitted to the hospitalist service.    Critical Care time was 30 minutes for this patient excluding procedures.     Disposition   The patient was admitted to the hospital.     Diagnosis     ICD-10-CM    1. NSTEMI (non-ST elevated myocardial infarction) (H)  I21.4              Scribe Disclosure:  Yunior DYER, am serving as a scribe at 10:35 AM on 11/27/2024 to document services personally performed by Bradley Silva MD based on my observations and the provider's statements to me.        Bradley Silva,  MD  11/27/24 3486

## 2024-11-27 NOTE — Clinical Note
Hemodynamic equipment used: 5 lead ECG, GiftlyK With 3 Leads, Machine BP Cuff and pulse oximeter probe.

## 2024-11-27 NOTE — CONSULTS
[facility]    Nephrology Consultation     Date of Admission:  11/27/2024    Assessment & Plan     Arnulfo Pritchett is a 64 year old male CAD, HTN, HLD, pafib, HFrEF (EF 20-25%), ESRD on PD, DM2, TALI, RCC s/p R nephrectomy (2022), RAD with multiple LE procedures who was admitted on 11/27/2024 with severe hypotension and generalized weakness.     Assessment:    Hypotension, improved   Lactic acidosis   Troponin elevation   Possible NSTEMI  Rule out sepsis  CAD   HFrEF (EF 20-25%)  Severe hypotension to 50s systolic. Labs notable for trop elevation in 500s, BNP 52978, lactate 3.6. Given 1 unit(s) pRBC and 1 L NS. Suspect hypotension due to hypovolemia and medications. Reports previous issues with hypotension and carvedilol use. Pt being covered with vanc/zosyn for possible infection. Will obtain PD sample to rule out peritonitis, but low suspicion without abd pain.   - hold carvedilol, entresto, torsemide for now   - TTE pending   - PD fluid to be collected by PD RN, cell count/diff, gram stain/culture     ESRD on PD   Follows with Dr. May at [facility]. Home prescription is 5 cycles, 2L fill volume, 9h treatment time, last fill 1.2L (extraneal). EDW 79kg. Typically uses green bags and last fill is extraneal (purple). No extraneal in hospital, will modify prescription for inpatient. Has good UOP.   - hold torsemide for now given hypotension   - PD tonight, all 1.5% (yellow) bags   - 5 cycles, 2L fill volume, 9h     Afib   On warfarin, INR 1.55. Holding carvedilol in setting of hypotension. Also on plavix. Hgb stable.     Mild hyponatremia   Likely due to hypovolemia. Can recheck tomorrow.     CKD MBD   Continue PTA sevevlember, calcitriol, and cinacalcet.     Plan/Recs:  1) PD tonight, 1.5% (yellow bags)   2) hold carvedilol, torsmide, entresto while hypotensive and hypovolemic     Reviewed notes from Dr. Glover (hospitalist), outpt nephrologist Dr. May, Cecilia Stearns NP/Romain  Daniel (vascular), notes from prior hospitalization.     Woodrow Serna MD   [facility] - Nephrology  [phone]  --------------------------------------------------------------------------------------------  Reason for Consult     I was asked to see the patient for ESRD.    Primary Care Physician     Chai Griffin    Chief Complaint     Weakness, hypotension    History is obtained from the patient and chart review.      History of Present Illness     Arnulfo Pritchett is a 64 year old male who presents with weakness and hypotension.     Patient was getting seen in vascular surgery clinic. Initial BP undetectable, manual BP checked and notably in 50s systolic. Pt feeling weak. Sent to ED.     In ED, vitals notable for BP 62.48, temp 92F, HR 50s. Due to recent endarterectomy, pt empirically given 1 unit(s) pRBC until Hgb returned in 11s. Pt given 1L NS, started vanc and zosyn for possible sepsis.     He reports symptoms of hypotension started the day prior. Notes issues with carvedilol in past, had to have it severely reduced due to hypotension. Due to feeling unwell, hadn't eaten much in last two days, prior to that had normal appetite. He denies abd pain, n/v, He denies SOB or chest pain. No significant new swelling. Denies dysuria, hematuria, etc.     [facility]. Strength is improving. PD going well, no major issues. Usually green bags and last fill is extraneal.       Past Medical History   I have reviewed this patient's medical history and updated it with pertinent information if needed.   Past Medical History:   Diagnosis Date    CAD (coronary artery disease)     Chronic systolic heart failure (H)     ESRD (end stage renal disease) on dialysis (H)     Gastroesophageal reflux disease without esophagitis     Gout     HLD (hyperlipidemia)     TALI (obstructive sleep apnea)     PAD (peripheral artery disease) (H)     S/p RLE stenting of SFA/popliteal  arteries    Type 2 diabetes mellitus with diabetic neuropathy (H)        Past Surgical History   I have reviewed this patient's surgical history and updated it with pertinent information if needed.  Past Surgical History:   Procedure Laterality Date    AMPUTATE TOE(S) Left 10/27/2024    Procedure: AMPUTATION, TOE left great toe;  Surgeon: Haley Joseph DPM, Podiatry/Foot and Ankle Surgery;  Location: [facility] OR    BYPASS GRAFT FEMOROTIBIAL Left 10/25/2024    Procedure: LEFT FEMORAL ENDARTERECTOMY, LEFT FEMORAL TO TIBIAL PERONEAL TRUNK BYPASS WITH LEFT GREAT SEPHANEOUS VEIN, WOUND VAC PLACEMENT ON LEFT GROIN.;  Surgeon: Raul Gray MD;  Location: [facility] OR    BYPASS GRAFT FEMOROTIBIAL Left 10/27/2024    Procedure: CREATION, BYPASS, VASCULAR, FEMORAL TO TIBIAL REVISION OF BYPASS LEFT COMMON FEMORAL TO TIBIAL.;  Surgeon: Raul Gray MD;  Location: [facility] OR    CV LOWER EXTREMITY ANGIOGRAM LEFT Left 10/27/2024    Procedure: Angiogram Lower Extremity Left;  Surgeon: Raul Gray MD;  Location: [facility] OR    IR LOWER EXTREMITY ANGIOGRAM LEFT  10/17/2024       Prior to Admission Medications   Prior to Admission Medications   Prescriptions Last Dose Informant Patient Reported? Taking?   B Complex-C-Folic Acid (DIALYVITE) TABS  Self Yes No   Sig: Take 1 tablet by mouth daily.   SEVELAMER CARBONATE PO  Self Yes No   Sig: Take 800 mg by mouth 3 times daily (with meals)   acetaminophen (TYLENOL) 500 MG tablet  Self Yes No   Sig: Take 1,000 mg by mouth 2 times daily.   acetaminophen-codeine (TYLENOL #3) 300-30 MG per tablet   Yes No   Sig: Take 1 tablet by mouth every 6 hours as needed for severe pain.   allopurinol (ZYLOPRIM) 100 MG tablet  Self Yes No   Sig: Take 200 mg by mouth every evening   atorvastatin (LIPITOR) 40 MG tablet  Self Yes No   Sig: Take 40 mg by mouth at bedtime.   calcitRIOL (ROCALTROL) 0.25 MCG capsule  Self Yes No   Sig: Take 0.25 mcg by mouth at bedtime.   carvedilol (COREG) 6.25 MG tablet    No No   Sig: Take 1 tablet (6.25 mg) by mouth at bedtime.   cinacalcet (SENSIPAR) 60 MG tablet  Self Yes No   Sig: Take 60 mg by mouth. Take 1 tablet (60 mg) three times a week: Monday, Wednesday, and Friday nights   clopidogrel (PLAVIX) 75 MG tablet   Yes No   Sig: Take 75 mg by mouth every evening.   gentamicin (GARAMYCIN) 0.1 % external cream  Self Yes No   Sig: Apply topically daily. Apply topically on peritoneal access site to prevent infections   gentamicin (GARAMYCIN) 0.1 % external cream   No No   Sig: Apply topically daily as needed (promotion of catheter exit site healing.).   glucose 40 % (400 mg/mL) gel   No No   Sig: Take 15-30 g by mouth every 15 minutes as needed for low blood sugar.   insulin glargine (LANTUS PEN) 100 UNIT/ML pen   No No   Sig: Inject 35 Units subcutaneously at bedtime.   melatonin 5 MG tablet  Self Yes No   Sig: Take 5 mg by mouth at bedtime   omeprazole (PRILOSEC) 40 MG DR capsule  Self Yes No   Sig: Take 40 mg by mouth daily.   oxyCODONE (ROXICODONE) 5 MG tablet   No No   Sig: Take 1 tablet (5 mg) by mouth every 6 hours as needed for severe pain.   sacubitril-valsartan (ENTRESTO) 49-51 MG per tablet  Self Yes No   Sig: Take 1 tablet by mouth 2 times daily   torsemide (DEMADEX) 100 MG tablet  Self Yes No   Sig: Take 100 mg by mouth every morning   warfarin ANTICOAGULANT (COUMADIN) 5 MG tablet  Self Yes No   Sig: Take by mouth daily. Take 5 mg MWF nights & 1.5 tablet ROW      Facility-Administered Medications: None     Allergies   No Known Allergies    Social History   I have reviewed this patient's social history and updated it with pertinent information if needed. Arnulfo Pritchett  reports that he has quit smoking. His smoking use included cigarettes. He has never used smokeless tobacco. He reports that he does not currently use alcohol. He reports that he does not use drugs.    Family History   I have reviewed this patient's family history and updated it with pertinent information  "if needed.   No family history on file.    Review of Systems   The 10 point Review of Systems is negative other than noted in the HPI.     Physical Exam   Temp: 97  F (36.1  C) Temp src: Temporal BP: 122/87 Pulse: 90   Resp: 12 SpO2: 100 % O2 Device: None (Room air)    Vital Signs with Ranges  Temp:  [92  F (33.3  C)-97  F (36.1  C)] 97  F (36.1  C)  Pulse:  [] 90  Resp:  [11-30] 12  BP: ()/(39-87) 122/87  SpO2:  [93 %-100 %] 100 %  0 lbs 0 oz    GENERAL: NAD   HEENT:  Normocephalic. Dry MM   CV: irregularly irregular  RESP: Clear anteriorly  GI: soft, nontender, nondistended. PD cahteter clean and intact   MUSCULOSKELETAL: trace LE edema  NEURO:   Alert, oriented, normal speech  PSYCH: mood good, affect appropriate      Data   BMP  Recent Labs   Lab 11/27/24  1035   *   POTASSIUM 4.9   CHLORIDE 89*   TERENCE 9.2   CO2 26   BUN 41.0*   CR 7.58*   *     Phos@[email or web address](phos:4)  CBC)  Recent Labs   Lab 11/27/24  1035   WBC 7.7   HGB 11.4*   HCT 35.6*           No lab results found in last 7 days.    Invalid input(s): \"BILIRUBININDIRECT\"  Recent Labs   Lab 11/27/24  1035   INR 1.55*     No results found for: \"D2VIT\", \"D3VIT\", \"DTOT\"  Recent Labs   Lab 11/27/24  1035   HGB 11.4*   HCT 35.6*        No results for input(s): \"PTHI\" in the last 168 hours.  No results found for: \"COLOR\", \"APPEARANCE\", \"URINEGLC\", \"URINEBILI\", \"URINEKETONE\", \"SG\", \"URINEPH\", \"PROTEIN\", \"UROBILINOGEN\", \"NITRITE\", \"LEUKEST\"        Woodrow Serna MD   [facility] - Nephrology  [phone]   "

## 2024-11-27 NOTE — CONSULTS
[facility]  Cardiology Consultation     Date of Admission:  11/27/2024    Assessment & Plan   Arnulfo Pritchett is a 64 year old male who was admitted on 11/27/2024.  He was seen in vascular surgery clinic and noted to be hypotensive to the 50s systolic.  He reported feeling unwell and generally off but no other symptoms.  Chest x-ray negative.  Troponin elevated just 564 and trended to 503.  NT proBNP 18,739.  No chest pain or shortness of breath.  Was noted to be anemic and received 1 unit of packed red blood cells.  Hemoglobin improved to 11 from 7.9. Received 1L IVF.     Hypotension significant, blood pressures in the 50s systolic  Anemia, s/p 1u PRBC  Lactic acidosis  Non-ST elevation myocardial infarction  Concern for cardiogenic shock  History of Coronary artery disease, inferior   STEMI in 2017 status post PCI to the RCA   2021 status post PCI to the OM and left circumflex  Lexiscan study in October of this year with no ischemia but noted inferior lateral and apical infarcts.  Patient presented with significant hypotension that was symptomatic.  No chest pain or pressure. Initial EKG showed sinus tachycardia with left axis deviation.  Repeat x-ray shows inferior infarct and anterior lateral infarct.  Troponin elevated to 560 and trended to 500.  No recent change to medication regimen.  No change in diet or oral intake.  Patient received 1 L of IV fluids and 1 unit of packed red blood cells and symptomatically improved and also there is noted improvement in patient's blood pressure to over 100 mmHg systolic.  Cardiomyopathy, likely ischemic, history of ejection fraction 25 to 30% on maximum tolerated guideline directed medical therapy.  Stat echocardiogram completed today showed ejection fraction of 30% with severe global hypokinesia of the left ventricle, similar to previous, based on read of [facility] echocardiogram.  Peripheral arterial disease  Critical limb ischemia status post  left femoral endarterectomy and bovine pericardial patch angioplasty, left distal common femoral arterial/proximal superficial femoral artery and tibioperoneal trunk bypass 2024  Status post ligation of side branches of the left saphenous vein and temporary closure of the right groin with wound VAC October 2024  Ischemic great left toe status post amputation October 27, 2024  Complicated by left groin wound infection  Status post SFA popliteal balloon angioplasty and stenting in May 2024  TALI on CPAP  Hypertension  PTA regimen: Carvedilol 6.25 mg twice daily Entresto 49-51 mg twice daily, torsemide 100 mg daily  Hyperlipidemia  Type 2 diabetes  History of CVA  ESRD on peritoneal dialysis.  Creatinine appears to be at baseline.  Nephrology consulted  Atrial fibrillation, persistent and rate controlled on warfarin for thromboembolic prophylaxis  On Coumadin prior to admission.  INR 5.5 upon admission  GERD   Anemia, status post 1 unit of packed red blood cells    Plan:  Emergent coronary angiogram today, Dr Spears discussed directly with Dr Matt- consent obtained  Admit to ICU after angiogram, likely require pressors and possibly IV diuretic drip; await LVDEDP on cath  Hold carvedilol, Entresto  Nephrology following for input regarding peritoneal dialysis  Monitor Hgb closely  Monitor closely for signs of infection     High complexity     KASSANDRA LeeC    Primary Care Physician   Chai Griffin    Reason for Consult   Reason for consult: I was asked by ED staff to evaluate this patient for possible non-ST elevation myocardial infarction.    History of Present Illness   Arnulfo Pritchett is a 64 year old male who presents with symptomatic hypotension    Patient was hospitalized in late October of this year with worsening left lower extremity pain and redness.  Vascular surgery that was complicated by left groin wound infection.  He was discharged on November 9, 2024.  Yesterday he was seen by podiatry for 1 month  follow-up and was noted to be doing well.  Today he was seen for postoperative treatment and vascular therapy and noted to have very low blood pressure, systolic blood pressure in the 50s but patient was reportedly asymptomatic and sent to the emergency department.  Was reportedly feeling unwell.    Patient reports oozing from the groin site since surgery 1 month prior.  He is known to be tail pale and tachycardic EKG nonischemic.  He was noted to have undetectable blood pressure.  He was given 1 units of packed red blood cells.  There is no acute anemia.  Lactic acid elevated to 3.6.  Vascular surgery saw the patient in the ER.  NT-proBNP 18,739.  Troponin elevated.  Patient started on a heparin drip..    Cardiac imaging reviewed  November 27, 2024 echocardiogram:  Left ventricular systolic function is severely reduced.  Biplane LVEF is 30%.  There is severe global hypokinesia of the left ventricle.  The right ventricular systolic function is mildly reduced.  No hemodynamically significant valvular aortic stenosis.  The study was technically difficult. The study was technically limited. There  is no comparison study available.    Nuclear medicine Lexiscan stress October 22, 2024:    The nuclear stress test is abnormal. No evidence of ischemia. presence of visecral tracer artifact decreases specificity.    There is a medium sized area of infarction in the entire inferolateral segment(s) of the left ventricle extending into basal inferior segment.    There is a small area of nontransmural infarction in the apical segment(s) of the left ventricle.    TID is absent.    Left ventricular function is severely reduced.    The left ventricular ejection fraction at stress is 26%.    There is no prior study for comparison.    June 2024 echocardi[facility] at [facility]:  * The estimated ejection fraction is 20-25%.     * Left ventricular systolic function is severely decreased.     * There is mild to moderate concentric left  ventricular hypertrophy.     * Detailed valvular analysis cannot be done due to limited nature of   study.    * Prior study from 10/24/2023. EF 20-25% - no change.       Past Medical History   Past Medical History:   Diagnosis Date    CAD (coronary artery disease)     Chronic systolic heart failure (H)     ESRD (end stage renal disease) on dialysis (H)     Gastroesophageal reflux disease without esophagitis     Gout     HLD (hyperlipidemia)     TALI (obstructive sleep apnea)     PAD (peripheral artery disease) (H)     S/p RLE stenting of SFA/popliteal arteries    Type 2 diabetes mellitus with diabetic neuropathy (H)        Past Surgical History   Past Surgical History:   Procedure Laterality Date    AMPUTATE TOE(S) Left 10/27/2024    Procedure: AMPUTATION, TOE left great toe;  Surgeon: Haley Joseph DPM, Podiatry/Foot and Ankle Surgery;  Location: [facility] OR    BYPASS GRAFT FEMOROTIBIAL Left 10/25/2024    Procedure: LEFT FEMORAL ENDARTERECTOMY, LEFT FEMORAL TO TIBIAL PERONEAL TRUNK BYPASS WITH LEFT GREAT SEPHANEOUS VEIN, WOUND VAC PLACEMENT ON LEFT GROIN.;  Surgeon: Raul Gray MD;  Location: [facility] OR    BYPASS GRAFT FEMOROTIBIAL Left 10/27/2024    Procedure: CREATION, BYPASS, VASCULAR, FEMORAL TO TIBIAL REVISION OF BYPASS LEFT COMMON FEMORAL TO TIBIAL.;  Surgeon: Raul Gray MD;  Location: [facility] OR    CV LOWER EXTREMITY ANGIOGRAM LEFT Left 10/27/2024    Procedure: Angiogram Lower Extremity Left;  Surgeon: Raul Gray MD;  Location: [facility] OR    IR LOWER EXTREMITY ANGIOGRAM LEFT  10/17/2024       Prior to Admission Medications   Prior to Admission Medications   Prescriptions Last Dose Informant Patient Reported? Taking?   B Complex-C-Folic Acid (DIALYVITE) TABS  Self Yes No   Sig: Take 1 tablet by mouth daily.   SEVELAMER CARBONATE PO  Self Yes No   Sig: Take 800 mg by mouth 3 times daily (with meals)   acetaminophen (TYLENOL) 500 MG tablet  Self Yes No   Sig: Take 1,000 mg by mouth 2 times daily.    acetaminophen-codeine (TYLENOL #3) 300-30 MG per tablet   Yes No   Sig: Take 1 tablet by mouth every 6 hours as needed for severe pain.   allopurinol (ZYLOPRIM) 100 MG tablet  Self Yes No   Sig: Take 200 mg by mouth every evening   atorvastatin (LIPITOR) 40 MG tablet  Self Yes No   Sig: Take 40 mg by mouth at bedtime.   calcitRIOL (ROCALTROL) 0.25 MCG capsule  Self Yes No   Sig: Take 0.25 mcg by mouth at bedtime.   carvedilol (COREG) 6.25 MG tablet   No No   Sig: Take 1 tablet (6.25 mg) by mouth at bedtime.   cinacalcet (SENSIPAR) 60 MG tablet  Self Yes No   Sig: Take 60 mg by mouth. Take 1 tablet (60 mg) three times a week: Monday, Wednesday, and Friday nights   clopidogrel (PLAVIX) 75 MG tablet   Yes No   Sig: Take 75 mg by mouth every evening.   gentamicin (GARAMYCIN) 0.1 % external cream  Self Yes No   Sig: Apply topically daily. Apply topically on peritoneal access site to prevent infections   gentamicin (GARAMYCIN) 0.1 % external cream   No No   Sig: Apply topically daily as needed (promotion of catheter exit site healing.).   glucose 40 % (400 mg/mL) gel   No No   Sig: Take 15-30 g by mouth every 15 minutes as needed for low blood sugar.   insulin glargine (LANTUS PEN) 100 UNIT/ML pen   No No   Sig: Inject 35 Units subcutaneously at bedtime.   melatonin 5 MG tablet  Self Yes No   Sig: Take 5 mg by mouth at bedtime   omeprazole (PRILOSEC) 40 MG DR capsule  Self Yes No   Sig: Take 40 mg by mouth daily.   oxyCODONE (ROXICODONE) 5 MG tablet   No No   Sig: Take 1 tablet (5 mg) by mouth every 6 hours as needed for severe pain.   sacubitril-valsartan (ENTRESTO) 49-51 MG per tablet  Self Yes No   Sig: Take 1 tablet by mouth 2 times daily   torsemide (DEMADEX) 100 MG tablet  Self Yes No   Sig: Take 100 mg by mouth every morning   warfarin ANTICOAGULANT (COUMADIN) 5 MG tablet  Self Yes No   Sig: Take by mouth daily. Take 5 mg MWF nights & 1.5 tablet ROW      Facility-Administered Medications: None     Current  Facility-Administered Medications   Medication Dose Route Frequency Provider Last Rate Last Admin    dianeal PD LOW calcium-1.5% dex (calcium 2.5 mEq/L) 6,000 mL PERITONEAL DIALYSATE   Dialysis [facility] Supplied PD Woodrow Serna MD        gentamicin (GARAMYCIN) 0.1 % cream   Topical Daily PRN Woodrow Serna MD        heparin 25,000 units in 0.45% NaCl 250 mL ANTICOAGULANT infusion  0-5,000 Units/hr Intravenous Continuous Bradley Silva MD 9 mL/hr at 11/27/24 1557 900 Units/hr at 11/27/24 1557    sodium chloride (PF) 0.9% PF flush 3 mL  3 mL Intracatheter q1 min prn Bradley Silva MD        sodium chloride (PF) 0.9% PF flush 3 mL  3 mL Intracatheter Q8H Bradley Silva MD         Current Outpatient Medications   Medication Sig Dispense Refill    acetaminophen (TYLENOL) 500 MG tablet Take 1,000 mg by mouth 2 times daily.      acetaminophen-codeine (TYLENOL #3) 300-30 MG per tablet Take 1 tablet by mouth every 6 hours as needed for severe pain.      allopurinol (ZYLOPRIM) 100 MG tablet Take 200 mg by mouth every evening      atorvastatin (LIPITOR) 40 MG tablet Take 40 mg by mouth at bedtime.      B Complex-C-Folic Acid (DIALYVITE) TABS Take 1 tablet by mouth daily.      calcitRIOL (ROCALTROL) 0.25 MCG capsule Take 0.25 mcg by mouth at bedtime.      carvedilol (COREG) 6.25 MG tablet Take 1 tablet (6.25 mg) by mouth at bedtime.      cinacalcet (SENSIPAR) 60 MG tablet Take 60 mg by mouth. Take 1 tablet (60 mg) three times a week: Monday, Wednesday, and Friday nights      clopidogrel (PLAVIX) 75 MG tablet Take 75 mg by mouth every evening.      gentamicin (GARAMYCIN) 0.1 % external cream Apply topically daily as needed (promotion of catheter exit site healing.).      gentamicin (GARAMYCIN) 0.1 % external cream Apply topically daily. Apply topically on peritoneal access site to prevent infections      glucose 40 % (400 mg/mL) gel Take 15-30 g by mouth every 15 minutes as needed for low blood sugar.      insulin  glargine (LANTUS PEN) 100 UNIT/ML pen Inject 35 Units subcutaneously at bedtime.      melatonin 5 MG tablet Take 5 mg by mouth at bedtime      omeprazole (PRILOSEC) 40 MG DR capsule Take 40 mg by mouth daily.      oxyCODONE (ROXICODONE) 5 MG tablet Take 1 tablet (5 mg) by mouth every 6 hours as needed for severe pain. 20 tablet 0    sacubitril-valsartan (ENTRESTO) 49-51 MG per tablet Take 1 tablet by mouth 2 times daily      SEVELAMER CARBONATE PO Take 800 mg by mouth 3 times daily (with meals)      torsemide (DEMADEX) 100 MG tablet Take 100 mg by mouth every morning      warfarin ANTICOAGULANT (COUMADIN) 5 MG tablet Take by mouth daily. Take 5 mg MWF nights & 1.5 tablet ROW       Current Facility-Administered Medications   Medication Dose Route Frequency Provider Last Rate Last Admin    dianeal PD LOW calcium-1.5% dex (calcium 2.5 mEq/L) 6,000 mL PERITONEAL DIALYSATE   Dialysis [facility] Supplied PD Woodrwo Serna MD        gentamicin (GARAMYCIN) 0.1 % cream   Topical Daily PRN Woodrow Serna MD        heparin 25,000 units in 0.45% NaCl 250 mL ANTICOAGULANT infusion  0-5,000 Units/hr Intravenous Continuous Bradley Silva MD 9 mL/hr at 11/27/24 1557 900 Units/hr at 11/27/24 1557    sodium chloride (PF) 0.9% PF flush 3 mL  3 mL Intracatheter q1 min prn Bradley Silva MD        sodium chloride (PF) 0.9% PF flush 3 mL  3 mL Intracatheter Q8H Bradley Silva MD         Current Outpatient Medications   Medication Sig Dispense Refill    acetaminophen (TYLENOL) 500 MG tablet Take 1,000 mg by mouth 2 times daily.      acetaminophen-codeine (TYLENOL #3) 300-30 MG per tablet Take 1 tablet by mouth every 6 hours as needed for severe pain.      allopurinol (ZYLOPRIM) 100 MG tablet Take 200 mg by mouth every evening      atorvastatin (LIPITOR) 40 MG tablet Take 40 mg by mouth at bedtime.      B Complex-C-Folic Acid (DIALYVITE) TABS Take 1 tablet by mouth daily.      calcitRIOL (ROCALTROL) 0.25 MCG capsule Take 0.25 mcg by  mouth at bedtime.      carvedilol (COREG) 6.25 MG tablet Take 1 tablet (6.25 mg) by mouth at bedtime.      cinacalcet (SENSIPAR) 60 MG tablet Take 60 mg by mouth. Take 1 tablet (60 mg) three times a week: Monday, Wednesday, and Friday nights      clopidogrel (PLAVIX) 75 MG tablet Take 75 mg by mouth every evening.      gentamicin (GARAMYCIN) 0.1 % external cream Apply topically daily as needed (promotion of catheter exit site healing.).      gentamicin (GARAMYCIN) 0.1 % external cream Apply topically daily. Apply topically on peritoneal access site to prevent infections      glucose 40 % (400 mg/mL) gel Take 15-30 g by mouth every 15 minutes as needed for low blood sugar.      insulin glargine (LANTUS PEN) 100 UNIT/ML pen Inject 35 Units subcutaneously at bedtime.      melatonin 5 MG tablet Take 5 mg by mouth at bedtime      omeprazole (PRILOSEC) 40 MG DR capsule Take 40 mg by mouth daily.      oxyCODONE (ROXICODONE) 5 MG tablet Take 1 tablet (5 mg) by mouth every 6 hours as needed for severe pain. 20 tablet 0    sacubitril-valsartan (ENTRESTO) 49-51 MG per tablet Take 1 tablet by mouth 2 times daily      SEVELAMER CARBONATE PO Take 800 mg by mouth 3 times daily (with meals)      torsemide (DEMADEX) 100 MG tablet Take 100 mg by mouth every morning      warfarin ANTICOAGULANT (COUMADIN) 5 MG tablet Take by mouth daily. Take 5 mg MWF nights & 1.5 tablet ROW       Allergies   No Known Allergies    Social History    reports that he has quit smoking. His smoking use included cigarettes. He has never used smokeless tobacco. He reports that he does not currently use alcohol. He reports that he does not use drugs.  Currently In tCU    Family History     No significant family history, including no history of: CAD       Review of Systems   A comprehensive review of system was performed and is negative other than that noted in the HPI or here.     Physical Exam   Vital Signs with Ranges  Temp:  [92  F (33.3  C)-97  F (36.1  " C)] 97  F (36.1  C)  Pulse:  [] 101  Resp:  [0-30] 11  BP: ()/(39-89) 98/72  SpO2:  [93 %-100 %] 95 %  Wt Readings from Last 4 Encounters:   11/26/24 76.2 kg (168 lb)   11/09/24 76.5 kg (168 lb 10.4 oz)   10/15/24 78.6 kg (173 lb 4.5 oz)   10/08/24 78.7 kg (173 lb 8 oz)     I/O last 3 completed shifts:  In: 1620 [IV Piggyback:1620]  Out: -       Vitals: BP 98/72   Pulse 101   Temp 97  F (36.1  C) (Temporal)   Resp 11   SpO2 95%     Physical Exam:   General - Alert and oriented to time place and person in no acute distress  Eyes - No scleral icterus  HEENT - Neck supple, moist mucous membranes  Cardiovascular - RRR, no murmur  Extremities - There is no edema  Respiratory - diminished breath sounds throughout   Skin - No pallor or cyanosis  Gastrointestinal - Non tender and non distended without rebound or guarding  Psych - Appropriate affect   Neurological - No gross motor neurological focal deficits    No lab results found in last 7 days.    Invalid input(s): \"TROPONINIES\"    Recent Labs   Lab 11/27/24  1035   WBC 7.7   HGB 11.4*         INR 1.55*   *   POTASSIUM 4.9   CHLORIDE 89*   CO2 26   BUN 41.0*   CR 7.58*   GFRESTIMATED 7*   ANIONGAP 14   TERENCE 9.2   *     No results for input(s): \"CHOL\", \"HDL\", \"LDL\", \"TRIG\", \"CHOLHDLRATIO\" in the last 08611 hours.  Recent Labs   Lab 11/27/24  1035   WBC 7.7   HGB 11.4*   HCT 35.6*           No results for input(s): \"PH\", \"PHV\", \"PO2\", \"PO2V\", \"SAT\", \"PCO2\", \"PCO2V\", \"HCO3\", \"HCO3V\" in the last 168 hours.  Recent Labs   Lab 11/27/24  1035   NTBNPI 18,969*     No results for input(s): \"DD\" in the last 168 hours.  No results for input(s): \"SED\", \"CRP\" in the last 168 hours.  Recent Labs   Lab 11/27/24  1035        Recent Labs   Lab 11/27/24  1035   TSH 6.45*     No results for input(s): \"COLOR\", \"APPEARANCE\", \"URINEGLC\", \"URINEBILI\", \"URINEKETONE\", \"SG\", \"UBLD\", \"URINEPH\", \"PROTEIN\", \"UROBILINOGEN\", \"NITRITE\", " "\"LEUKEST\", \"RBCU\", \"WBCU\" in the last 168 hours.    Imaging:  Recent Results (from the past 48 hours)   XR Chest Port 1 View    Narrative    CHEST ONE VIEW PORTABLE  2024 10:48 AM     HISTORY: Weakness.    COMPARISON: None.      Impression    IMPRESSION: No acute cardiopulmonary disease.    EMERSON ROSENBERG MD         SYSTEM ID:  [id number]   Echocardiogram Limited   Result Value    Biplane LVEF 30%    Narrative    [id number]  [id number]  [id number]  [id number]     [facility]  Echocardiography Laboratory  [address] [place] [id number]     Name: WILLA MITTAL  MRN: [id number]  : 1960  Study Date: 2024 12:16 PM  Age: 64 yrs  Gender: Male  Patient Location: [facility]  Reason For Study: CAD  Ordering Physician: SHRUTHI CORTES  Referring Physician: Chai Griffin  Performed By: Daniel Mi RDCS     BSA: 2.0 m2  Height: 73 in  Weight: 163 lb  HR: 90  BP: 118/80 mmHg  ______________________________________________________________________________  Procedure  Limited Portable Echo Adult. Definity (NDC #51847-881) given intravenously.  ______________________________________________________________________________  Interpretation Summary     Left ventricular systolic function is severely reduced.  Biplane LVEF is 30%.  There is severe global hypokinesia of the left ventricle.  The right ventricular systolic function is mildly reduced.  No hemodynamically significant valvular aortic stenosis.  The study was technically difficult. The study was technically limited. There  is no comparison study available.  ______________________________________________________________________________  Left Ventricle  The left ventricle is normal in size. There is normal left ventricular wall  thickness. Left ventricular systolic function is severely reduced. Biplane  LVEF is 30%. Left ventricular diastolic function is indeterminate. There is  severe global hypokinesia of the left ventricle.     Right " Ventricle  The right ventricular systolic function is mildly reduced.     Mitral Valve  There is moderate mitral annular calcification. The mitral valve leaflets are  mildly thickened. The mitral valve is not well visualized. There is trace  mitral regurgitation.     Tricuspid Valve  No tricuspid regurgitation. Right ventricular systolic pressure could not be  approximated due to inadequate tricuspid regurgitation. IVC diameter <2.1 cm  collapsing >50% with sniff suggests a normal RA pressure of 3 mmHg.     Aortic Valve  There is moderate trileaflet aortic sclerosis. There is trace aortic  regurgitation. No hemodynamically significant valvular aortic stenosis. The  mean AoV pressure gradient is 8.4 mmHg.     Pulmonic Valve  The pulmonic valve is not well visualized.  ______________________________________________________________________________  MMode/2D Measurements & Calculations  IVSd: 1.0 cm  LVIDd: 5.3 cm  LVIDs: 4.6 cm  LVPWd: 0.93 cm  FS: 13.5 %  LV mass(C)d: 195.0 grams  LV mass(C)dI: 98.9 grams/m2  Ao root diam: 3.5 cm  LA dimension: 3.7 cm  LA/Ao: 1.0  LVOT diam: 2.2 cm  LVOT area: 3.8 cm2  Ao root diam index Ht(cm/m): 1.9  Ao root diam index BSA (cm/m2): 1.8  EF Biplane: 29.7 %  RWT: 0.35     Doppler Measurements & Calculations  MV max P.9 mmHg  MV mean P.4 mmHg  MV V2 VTI: 27.6 cm  MVA(VTI): 2.7 cm2     Ao V2 max: 186.5 cm/sec  Ao max P.0 mmHg  Ao V2 mean: 137.7 cm/sec  Ao mean P.4 mmHg  Ao V2 VTI: 34.9 cm  DAVID(I,D): 2.2 cm2  DAVID(V,D): 2.3 cm2  LV V1 max P.2 mmHg  LV V1 max: 113.7 cm/sec  LV V1 VTI: 20.0 cm  SV(LVOT): 75.8 ml  SI(LVOT): 38.4 ml/m2  AV Saul Ratio (DI): 0.61  DAVID Index (cm2/m2): 1.1     ______________________________________________________________________________  Report approved by: Heidy Fernando 2024 01:13 PM             Echo:  No results found for this or any previous visit (from the past 4320 hours).    Clinically Significant Risk Factors Present on  Admission         # Hyponatremia: Lowest Na = 129 mmol/L in last 2 days, will monitor as appropriate  # Hypochloremia: Lowest Cl = 89 mmol/L in last 2 days, will monitor as appropriate         # Drug Induced Coagulation Defect: home medication list includes an anticoagulant medication  # Drug Induced Platelet Defect: home medication list includes an antiplatelet medication   # Hypertension: Noted on problem list               # Financial/Environmental Concerns:        Systolic chronic    hyponatremia and Hypo-osmolality      CKD POA List: Stage 5 (GFR < 15)

## 2024-11-27 NOTE — CONSULTS
Vascular Surgery Consultation    Arnulfo Pritchett MRN# [id number]   Age: 64 year old YOB: 1960     Date of Admission:  11/27/2024    Date of Consult:   11/27/24    Reason for consult: Post-op bypass       Requesting service: Hospitalist; requesting provider: Dr. Glover                   Assessment and Plan:   64M with hx CAD s/p numerous PCI, HTN, HLD, pAFib, ESRD on PD, T2D, TALI, and PAD who is s/p L fem endarterectomy and L fem-TP trunk bypass with GSV on 10/25/2024 and revision of bypass with ligation of side branches on 10/27/2024. He is readmitted following symptomatic hypotension noticed in Vascular Surgery clinic. Unclear etiology of hypotension, though cardiogenic shock appears to be the most likely culprit at this time. L groin incision appears to be healing well with no evidence of infection. Bypass remains open.    - Vascular Surgery will continue to follow while inpatient  - Agree with cardiology consult and work-up for possible cardiogenic shock  - Continue q4h doppler checks while inpatient. If any major changes in exam, please page our team  - Continue atorvastatin, plavix when able  - No clear indication for antibiotics at this time    Discussed with staff, Dr. Gray.    Milo Carrion MD  Vascular Surgery resident             Chief Complaint:   Hx recent bypass         History of Present Illness:   Mr. Pritchett is a 64M with hx CAD s/p numerous PCI, HTN, HLD, pAFib, ESRD on PD, T2D, TALI, and PAD who is s/p L fem endarterectomy and L fem-TP trunk bypass with GSV on 10/25/2024 and revision of bypass with ligation of side branches on 10/27/2024. He is currently being admitted after presenting to the Vascular Surgery clinic hypotensive to 53/36 --> 64/48, and feeling lightheaded. He states that he felt similar yesterday, but this resolved without any intervention. He denies any chest pain, abdominal pain, or back pain during this episode or now.     He has had minimal drainage from his  groin wound, which he does not describe as purulent. He is currently having chills but denies any fevers or chills at home. He has minimal erythema around the groin wound.              Past Medical History:     Past Medical History:   Diagnosis Date    CAD (coronary artery disease)     Chronic systolic heart failure (H)     ESRD (end stage renal disease) on dialysis (H)     Gastroesophageal reflux disease without esophagitis     Gout     HLD (hyperlipidemia)     TALI (obstructive sleep apnea)     PAD (peripheral artery disease) (H)     S/p RLE stenting of SFA/popliteal arteries    Type 2 diabetes mellitus with diabetic neuropathy (H)              Past Surgical History:     Past Surgical History:   Procedure Laterality Date    AMPUTATE TOE(S) Left 10/27/2024    Procedure: AMPUTATION, TOE left great toe;  Surgeon: Haley Joseph DPM, Podiatry/Foot and Ankle Surgery;  Location: [facility]    BYPASS GRAFT FEMOROTIBIAL Left 10/25/2024    Procedure: LEFT FEMORAL ENDARTERECTOMY, LEFT FEMORAL TO TIBIAL PERONEAL TRUNK BYPASS WITH LEFT GREAT SEPHANEOUS VEIN, WOUND VAC PLACEMENT ON LEFT GROIN.;  Surgeon: Raul Gray MD;  Location: [facility]    BYPASS GRAFT FEMOROTIBIAL Left 10/27/2024    Procedure: CREATION, BYPASS, VASCULAR, FEMORAL TO TIBIAL REVISION OF BYPASS LEFT COMMON FEMORAL TO TIBIAL.;  Surgeon: Raul Gray MD;  Location: [facility]    CV LOWER EXTREMITY ANGIOGRAM LEFT Left 10/27/2024    Procedure: Angiogram Lower Extremity Left;  Surgeon: Raul Gray MD;  Location: [facility]    IR LOWER EXTREMITY ANGIOGRAM LEFT  10/17/2024             Social History:     Social History     Tobacco Use    Smoking status: Former     Types: Cigarettes    Smokeless tobacco: Never   Substance Use Topics    Alcohol use: Not Currently     Comment: quit 20 years             Family History:   No family history on file.             Allergies:   No Known Allergies          Medications:     Current Facility-Administered Medications    Medication Dose Route Frequency Provider Last Rate Last Admin    heparin 25,000 units in 0.45% NaCl 250 mL ANTICOAGULANT infusion  0-5,000 Units/hr Intravenous Continuous Bradley Silva MD 9 mL/hr at 11/27/24 1154 900 Units/hr at 11/27/24 1154    sodium chloride (PF) 0.9% PF flush 3 mL  3 mL Intracatheter q1 min prn Bradley Silva MD        sodium chloride (PF) 0.9% PF flush 3 mL  3 mL Intracatheter Q8H Bradley Silva MD         Current Outpatient Medications   Medication Sig Dispense Refill    acetaminophen (TYLENOL) 500 MG tablet Take 1,000 mg by mouth 2 times daily.      acetaminophen-codeine (TYLENOL #3) 300-30 MG per tablet Take 1 tablet by mouth every 6 hours as needed for severe pain.      allopurinol (ZYLOPRIM) 100 MG tablet Take 200 mg by mouth every evening      atorvastatin (LIPITOR) 40 MG tablet Take 40 mg by mouth at bedtime.      B Complex-C-Folic Acid (DIALYVITE) TABS Take 1 tablet by mouth daily.      calcitRIOL (ROCALTROL) 0.25 MCG capsule Take 0.25 mcg by mouth at bedtime.      carvedilol (COREG) 6.25 MG tablet Take 1 tablet (6.25 mg) by mouth at bedtime.      cinacalcet (SENSIPAR) 60 MG tablet Take 60 mg by mouth. Take 1 tablet (60 mg) three times a week: Monday, Wednesday, and Friday nights      clopidogrel (PLAVIX) 75 MG tablet Take 75 mg by mouth every evening.      gentamicin (GARAMYCIN) 0.1 % external cream Apply topically daily as needed (promotion of catheter exit site healing.).      gentamicin (GARAMYCIN) 0.1 % external cream Apply topically daily. Apply topically on peritoneal access site to prevent infections      glucose 40 % (400 mg/mL) gel Take 15-30 g by mouth every 15 minutes as needed for low blood sugar.      insulin glargine (LANTUS PEN) 100 UNIT/ML pen Inject 35 Units subcutaneously at bedtime.      melatonin 5 MG tablet Take 5 mg by mouth at bedtime      omeprazole (PRILOSEC) 40 MG DR capsule Take 40 mg by mouth daily.      oxyCODONE (ROXICODONE) 5 MG tablet Take 1 tablet (5 mg)  by mouth every 6 hours as needed for severe pain. 20 tablet 0    sacubitril-valsartan (ENTRESTO) 49-51 MG per tablet Take 1 tablet by mouth 2 times daily      SEVELAMER CARBONATE PO Take 800 mg by mouth 3 times daily (with meals)      torsemide (DEMADEX) 100 MG tablet Take 100 mg by mouth every morning      warfarin ANTICOAGULANT (COUMADIN) 5 MG tablet Take by mouth daily. Take 5 mg MWF nights & 1.5 tablet ROW                 Review of Systems:     A 12 point review of systems was completed and found to be negative unless otherwise stated in the above HPI            Physical Exam:   /87   Pulse 90   Temp 97  F (36.1  C) (Temporal)   Resp 12   SpO2 100%       Intake/Output Summary (Last 24 hours) at 11/27/2024 1417  Last data filed at 11/27/2024 1150  Gross per 24 hour   Intake 100 ml   Output --   Net 100 ml       GEN: A&Ox3, no acute distress  NEURO: CN II-XII grossly intact. No gross neurologic deficits  NECK: trachea midline, no JVD  HEENT: No scleral icterus.  RESP: Nonlabored breathing on room air  CV: RRR by tele, noncyanotic  ABD: soft, non-tender, nondistended. No guarding or rebound tenderness. Groin wound with several areas still healing, minimum serous drainage, small amount of erythema  MSK: Moves all extremities independently. Normal active range of motion.  PSYCH: cooperative   PULSES: Monophasic DP and PT signals          Data:   All laboratory data reviewed    Results:  BMP  Recent Labs   Lab 11/27/24  1035   *   POTASSIUM 4.9   CHLORIDE 89*   CO2 26   BUN 41.0*   CR 7.58*   *     CBC  Recent Labs   Lab 11/27/24  1035   WBC 7.7   HGB 11.4*        LFT  Recent Labs   Lab 11/27/24  1035   INR 1.55*     Recent Labs   Lab 11/27/24  1035   *       Imaging:  Echocardiogram Limited    Result Date: 11/27/2024  [id number]  [facility] [address] [place] [address]  Name: WILLA MITTAL  MRN: [id number] : 1960 Study Date: 2024 12:16 PM Age: 64 yrs Gender: Male Patient Location: [facility] Reason For Study: CAD Ordering Physician: SHRUTHI CORTES Referring Physician: Chai Griffin Performed By: Daniel Mi RDCS  BSA: 2.0 m2 Height: 73 in Weight: 163 lb HR: 90 BP: 118/80 mmHg ______________________________________________________________________________ Procedure Limited Portable Echo Adult. Definity (NDC #79622-929) given intravenously. ______________________________________________________________________________ Interpretation Summary  Left ventricular systolic function is severely reduced. Biplane LVEF is 30%. There is severe global hypokinesia of the left ventricle. The right ventricular systolic function is mildly reduced. No hemodynamically significant valvular aortic stenosis. The study was technically difficult. The study was technically limited. There is no comparison study available. ______________________________________________________________________________ Left Ventricle The left ventricle is normal in size. There is normal left ventricular wall thickness. Left ventricular systolic function is severely reduced. Biplane LVEF is 30%. Left ventricular diastolic function is indeterminate. There is severe global hypokinesia of the left ventricle.  Right Ventricle The right ventricular systolic function is mildly reduced.  Mitral Valve There is moderate mitral annular calcification. The mitral valve leaflets are mildly thickened. The mitral valve is not well visualized. There is trace mitral regurgitation.  Tricuspid Valve No tricuspid regurgitation. Right ventricular systolic pressure could not be approximated due to inadequate tricuspid regurgitation. IVC diameter <2.1 cm collapsing >50% with sniff suggests a normal RA pressure of 3 mmHg.  Aortic Valve There is moderate trileaflet aortic sclerosis. There is trace aortic regurgitation. No hemodynamically significant valvular aortic  stenosis. The mean AoV pressure gradient is 8.4 mmHg.  Pulmonic Valve The pulmonic valve is not well visualized. ______________________________________________________________________________ MMode/2D Measurements & Calculations IVSd: 1.0 cm LVIDd: 5.3 cm LVIDs: 4.6 cm LVPWd: 0.93 cm FS: 13.5 % LV mass(C)d: 195.0 grams LV mass(C)dI: 98.9 grams/m2 Ao root diam: 3.5 cm LA dimension: 3.7 cm LA/Ao: 1.0 LVOT diam: 2.2 cm LVOT area: 3.8 cm2 Ao root diam index Ht(cm/m): 1.9 Ao root diam index BSA (cm/m2): 1.8 EF Biplane: 29.7 % RWT: 0.35  Doppler Measurements & Calculations MV max P.9 mmHg MV mean P.4 mmHg MV V2 VTI: 27.6 cm MVA(VTI): 2.7 cm2  Ao V2 max: 186.5 cm/sec Ao max P.0 mmHg Ao V2 mean: 137.7 cm/sec Ao mean P.4 mmHg Ao V2 VTI: 34.9 cm DAVID(I,D): 2.2 cm2 DAVID(V,D): 2.3 cm2 LV V1 max P.2 mmHg LV V1 max: 113.7 cm/sec LV V1 VTI: 20.0 cm SV(LVOT): 75.8 ml SI(LVOT): 38.4 ml/m2 AV Saul Ratio (DI): 0.61 DAVID Index (cm2/m2): 1.1  ______________________________________________________________________________ Report approved by: Heidy Fernando 2024 01:13 PM       XR Chest Port 1 View    Result Date: 2024  CHEST ONE VIEW PORTABLE  2024 10:48 AM HISTORY: Weakness. COMPARISON: None.     IMPRESSION: No acute cardiopulmonary disease. EMERSON ROSENBERG MD   SYSTEM ID:  [id number]

## 2024-11-27 NOTE — PROGRESS NOTES
VASCULAR SURGERY PROGRESS NOTE    LOCATION: [facility]    Arnulof Pritchett  Medical Record #: [id number]  YOB: 1960  Age: 64 year old     Date of Service: 11/27/2024    PRIMARY CARE PROVIDER: Chia Griffin    Reason for visit:  post-operative appointment    Arnulfo Pricthett is a 64 year old male status post L femoral to TP trunk bypass  with Dr. Gray on 10/25/2024 with partially translocated in situ GSV and ligation of side branches on 10/27. Groin incision has had delayed wound healing.    BP very low with manual BP in the low 50s. Patient is symptomatic.   Patient sent to ED and will formally evaluate there. Dr. Gray aware.     REVIEW OF SYSTEMS:    A 12 point ROS was reviewed and is negative except for what is listed above in HPI.    PHH:    Past Medical History:   Diagnosis Date    CAD (coronary artery disease)     Chronic systolic heart failure (H)     ESRD (end stage renal disease) on dialysis (H)     Gastroesophageal reflux disease without esophagitis     Gout     HLD (hyperlipidemia)     TALI (obstructive sleep apnea)     PAD (peripheral artery disease) (H)     S/p RLE stenting of SFA/popliteal arteries    Type 2 diabetes mellitus with diabetic neuropathy (H)           Past Surgical History:   Procedure Laterality Date    AMPUTATE TOE(S) Left 10/27/2024    Procedure: AMPUTATION, TOE left great toe;  Surgeon: Haley Joseph DPM, Podiatry/Foot and Ankle Surgery;  Location: [facility]    BYPASS GRAFT FEMOROTIBIAL Left 10/25/2024    Procedure: LEFT FEMORAL ENDARTERECTOMY, LEFT FEMORAL TO TIBIAL PERONEAL TRUNK BYPASS WITH LEFT GREAT SEPHANEOUS VEIN, WOUND VAC PLACEMENT ON LEFT GROIN.;  Surgeon: Raul Gray MD;  Location: [facility]    BYPASS GRAFT FEMOROTIBIAL Left 10/27/2024    Procedure: CREATION, BYPASS, VASCULAR, FEMORAL TO TIBIAL REVISION OF BYPASS LEFT COMMON FEMORAL TO TIBIAL.;  Surgeon: Raul Gray MD;  Location: [facility]    CV LOWER EXTREMITY ANGIOGRAM LEFT Left  10/27/2024    Procedure: Angiogram Lower Extremity Left;  Surgeon: Raul Gray MD;  Location: [facility]    IR LOWER EXTREMITY ANGIOGRAM LEFT  10/17/2024       ALLERGIES:  Patient has no known allergies.    MEDS:    Current Outpatient Medications:     acetaminophen (TYLENOL) 500 MG tablet, Take 1,000 mg by mouth 2 times daily., Disp: , Rfl:     acetaminophen-codeine (TYLENOL #3) 300-30 MG per tablet, Take 1 tablet by mouth every 6 hours as needed for severe pain., Disp: , Rfl:     allopurinol (ZYLOPRIM) 100 MG tablet, Take 200 mg by mouth every evening, Disp: , Rfl:     atorvastatin (LIPITOR) 40 MG tablet, Take 40 mg by mouth at bedtime., Disp: , Rfl:     B Complex-C-Folic Acid (DIALYVITE) TABS, Take 1 tablet by mouth daily., Disp: , Rfl:     calcitRIOL (ROCALTROL) 0.25 MCG capsule, Take 0.25 mcg by mouth at bedtime., Disp: , Rfl:     carvedilol (COREG) 6.25 MG tablet, Take 1 tablet (6.25 mg) by mouth at bedtime., Disp: , Rfl:     cinacalcet (SENSIPAR) 60 MG tablet, Take 60 mg by mouth. Take 1 tablet (60 mg) three times a week: Monday, Wednesday, and Friday nights, Disp: , Rfl:     clopidogrel (PLAVIX) 75 MG tablet, Take 75 mg by mouth every evening., Disp: , Rfl:     gentamicin (GARAMYCIN) 0.1 % external cream, Apply topically daily as needed (promotion of catheter exit site healing.)., Disp: , Rfl:     gentamicin (GARAMYCIN) 0.1 % external cream, Apply topically daily. Apply topically on peritoneal access site to prevent infections, Disp: , Rfl:     glucose 40 % (400 mg/mL) gel, Take 15-30 g by mouth every 15 minutes as needed for low blood sugar., Disp: , Rfl:     insulin glargine (LANTUS PEN) 100 UNIT/ML pen, Inject 35 Units subcutaneously at bedtime., Disp: , Rfl:     melatonin 5 MG tablet, Take 5 mg by mouth at bedtime, Disp: , Rfl:     omeprazole (PRILOSEC) 40 MG DR capsule, Take 40 mg by mouth daily., Disp: , Rfl:     oxyCODONE (ROXICODONE) 5 MG tablet, Take 1 tablet (5 mg) by mouth every 6 hours as  needed for severe pain., Disp: 20 tablet, Rfl: 0    sacubitril-valsartan (ENTRESTO) 49-51 MG per tablet, Take 1 tablet by mouth 2 times daily, Disp: , Rfl:     SEVELAMER CARBONATE PO, Take 800 mg by mouth 3 times daily (with meals), Disp: , Rfl:     torsemide (DEMADEX) 100 MG tablet, Take 100 mg by mouth every morning, Disp: , Rfl:     warfarin ANTICOAGULANT (COUMADIN) 5 MG tablet, Take by mouth daily. Take 5 mg MWF nights & 1.5 tablet ROW, Disp: , Rfl:     SOCIAL HABITS:    History   Smoking Status    Former    Types: Cigarettes   Smokeless Tobacco    Never     Social History    Substance and Sexual Activity      Alcohol use: Not Currently        Comment: quit 20 years      History   Drug Use Not on file       FAMILY HISTORY:  No family history on file.    PE:  There were no vitals taken for this visit.  Wt Readings from Last 1 Encounters:   11/26/24 168 lb (76.2 kg)     There is no height or weight on file to calculate BMI.      LABS:      Sodium   Date Value Ref Range Status   11/09/2024 133 (L) 135 - 145 mmol/L Final   11/08/2024 133 (L) 135 - 145 mmol/L Final   11/07/2024 132 (L) 135 - 145 mmol/L Final     Urea Nitrogen   Date Value Ref Range Status   11/09/2024 43.8 (H) 8.0 - 23.0 mg/dL Final   11/08/2024 48.1 (H) 8.0 - 23.0 mg/dL Final   11/07/2024 49.6 (H) 8.0 - 23.0 mg/dL Final     Hemoglobin   Date Value Ref Range Status   11/09/2024 8.3 (L) 13.3 - 17.7 g/dL Final   11/08/2024 7.6 (L) 13.3 - 17.7 g/dL Final   11/07/2024 8.0 (L) 13.3 - 17.7 g/dL Final     Platelet Count   Date Value Ref Range Status   11/09/2024 309 150 - 450 10e3/uL Final   11/08/2024 301 150 - 450 10e3/uL Final   11/07/2024 307 150 - 450 10e3/uL Final     INR   Date Value Ref Range Status   11/09/2024 2.31 (H) 0.85 - 1.15 Final   11/08/2024 2.02 (H) 0.85 - 1.15 Final   11/07/2024 2.15 (H) 0.85 - 1.15 Final         Cecilia Stearns NP  VASCULAR SURGERY

## 2024-11-27 NOTE — H&P
[facility] [place]    History and Physical - Hospitalist Service       Date of Admission:  11/27/2024    Assessment & Plan      Arnulfo Pritchett is a 64 year old male with history of coronary artery disease with multiple stents, hypertension, hyperlipidemia, chronic paroxysmal A-fib  (s/p ablation), ESRD on PD, secondary hyperparathyroidism, RCC  (s/p nephrectomy), DM type II, gout, GERD, TALI who was recently admitted to the hospital from 10/15 - 11/9 for critical limb ischemia of the left lower extremity and ischemic left great toe wound (see details of surgery below ), now is being admitted on 11/27/2024 with generalized weakness.      # Hypotension on admission: Responded to 1 unit PRBC, 1 L normal saline  # Lactic acidosis  # Non-STEMI versus nonischemic myocardial injury due to significant hypotension (recent nuclear test with no ischemia ), admission troponin 564 and BNP of 18, 739   # History ofcoronary artery disease with multiple stents (last in 2021)  - * Echo 6/2024 showed LVEF 20-25%, segmental wall motion globally hypokinetic.   * Nuc stress test 10/22 was abnormal but no evidence of ischemia; medium sized area of infarction in the entire inferolateral segment(s) of the left ventricle extending into basal inferior segment; small area of nontransmural infarction in the apical segment(s) of the left ventricle; TID absent; left ventricular ejection fraction at stress 26%.    -Patient started on heparin gtt. on admission.  Will continue same (Interestingly-NM scan: No evidence of ischemia. presence of visecral tracer artifact decreases specificity.,  Will have further clarification from cardiology)   -Cardiology consulted.  Appreciate discussion by ER MD.  -Continue PTA Plavix, atorvastatin.  Hold carvedilol, Entresto and torsemide due to hypotension  -Stat echocardiogram for further worsening of WMA.  At baseline EF 26%  -Trend troponin.     # Chronic A-fib:   Rate controlled: Hold PTA  carvedilol  Anticoagulation: PTA on Coumadin.  Admission INR of 5.55.  Transition to IV heparin for now    #recent admission from 10/15 - 11/9 with Critical limb ischemia of the left lower extremity.  # S/p left femoral endarterectomy with bovine pericardial patch angioplasty; left distal common femoral artery/proximal superficial femoral artery to tibioperoneal trunk bypass; and temporary closure of left groin wound with wound VAC 10/25/2024.  # S/p ligation of side branches of the left great saphenous vein and temporary closure of right groin with application of wound VAC 10/27/2024.  Ischemic left great toe wound - s/p amputation of left great toe 10/27/2024.  Left groin wound infection.  H/o acute RLE arterial occlusion s/p SFA popliteal balloon angioplasty and stenting (5/2024).  -Appreciate vascular surgery review in ER.  No concerns for active infection  -Continue to follow-up with vascular surgery  ??  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 until further evaluation by cardiology.  DVT Prophylaxis: On heparin gtt.  Rosario Catheter: Not present  Lines: None     Cardiac Monitoring: None  Code Status:  Full    Clinically Significant Risk Factors Present on Admission         # Hyponatremia: Lowest Na = 129 mmol/L in last 2 days, will monitor as appropriate  # Hypochloremia: Lowest Cl = 89 mmol/L in last 2 days, will monitor as appropriate       # Drug Induced Coagulation Defect: home  medication list includes an anticoagulant medication  # Drug Induced Platelet Defect: home medication list includes an antiplatelet medication   # Hypertension: Noted on problem list               # Financial/Environmental Concerns:           Disposition Plan     Medically Ready for Discharge: Anticipated in 2-4 Days       Maurizio Glover MD  Hospitalist Service  [facility]  Securely message with [employer] (more info)  Text page via [facility] Paging/Directory     _The above note was dictated using voice recognition software. Although reviewed after completion, some word and grammatical error may remain . Please contact the author for any clarifications.  _____________________________________________________________________    Chief Complaint   Weakness    History is obtained from the patient  Discussion with ER MD.  Review of previous discharge summary    History of Present Illness   Arnulfo Pritchett is a 64 year old male with history of coronary artery disease with multiple stents, hypertension, hyperlipidemia, chronic paroxysmal A-fib  (s/p ablation), ESRD on PD, secondary hyperparathyroidism, RCC  (s/p nephrectomy), DM type II, gout, GERD, TALI who was recently admitted to the hospital from 10/15 - 11/9 for critical limb ischemia of the left lower extremity and ischemic left great toe wound (see details of surgery below ), now is being admitted on 11/27/2024 with generalized weakness.      As per him, he has been in TCU and has been doing okay until 2 days ago.  For the last 2 days he has been feeling generally weak and dizzy as if his blood pressure is low.  He denies any associated chest pain/palpitation/recent fever/chills/rigors  Denies any cough/cold/urinary or bowel symptoms.  Has noted some oozing from the left groin.  He was seen in clinic today and was found to have SBP in 50s so was transferred to the hospital.  In the ER he was given 1 unit PRBC, 1 L fluid bolus along with vancomycin,  Zosyn and started on IV heparin gtt.    Past Medical History    Past Medical History:   Diagnosis Date    CAD (coronary artery disease)     Chronic systolic heart failure (H)     ESRD (end stage renal disease) on dialysis (H)     Gastroesophageal reflux disease without esophagitis     Gout     HLD (hyperlipidemia)     TALI (obstructive sleep apnea)     PAD (peripheral artery disease) (H)     S/p RLE stenting of SFA/popliteal arteries    Type 2 diabetes mellitus with diabetic neuropathy (H)        Past Surgical History   Past Surgical History:   Procedure Laterality Date    AMPUTATE TOE(S) Left 10/27/2024    Procedure: AMPUTATION, TOE left great toe;  Surgeon: Haley Joseph DPM, Podiatry/Foot and Ankle Surgery;  Location: [facility] OR    BYPASS GRAFT FEMOROTIBIAL Left 10/25/2024    Procedure: LEFT FEMORAL ENDARTERECTOMY, LEFT FEMORAL TO TIBIAL PERONEAL TRUNK BYPASS WITH LEFT GREAT SEPHANEOUS VEIN, WOUND VAC PLACEMENT ON LEFT GROIN.;  Surgeon: Raul Gray MD;  Location: [facility] OR    BYPASS GRAFT FEMOROTIBIAL Left 10/27/2024    Procedure: CREATION, BYPASS, VASCULAR, FEMORAL TO TIBIAL REVISION OF BYPASS LEFT COMMON FEMORAL TO TIBIAL.;  Surgeon: Raul Gray MD;  Location: [facility] OR    CV LOWER EXTREMITY ANGIOGRAM LEFT Left 10/27/2024    Procedure: Angiogram Lower Extremity Left;  Surgeon: Raul Gray MD;  Location: [facility] OR    IR LOWER EXTREMITY ANGIOGRAM LEFT  10/17/2024       Prior to Admission Medications   Prior to Admission Medications   Prescriptions Last Dose Informant Patient Reported? Taking?   B Complex-C-Folic Acid (DIALYVITE) TABS  Self Yes No   Sig: Take 1 tablet by mouth daily.   SEVELAMER CARBONATE PO  Self Yes No   Sig: Take 800 mg by mouth 3 times daily (with meals)   acetaminophen (TYLENOL) 500 MG tablet  Self Yes No   Sig: Take 1,000 mg by mouth 2 times daily.   acetaminophen-codeine (TYLENOL #3) 300-30 MG per tablet   Yes No   Sig: Take 1 tablet by mouth every 6 hours as needed for  severe pain.   allopurinol (ZYLOPRIM) 100 MG tablet  Self Yes No   Sig: Take 200 mg by mouth every evening   atorvastatin (LIPITOR) 40 MG tablet  Self Yes No   Sig: Take 40 mg by mouth at bedtime.   calcitRIOL (ROCALTROL) 0.25 MCG capsule  Self Yes No   Sig: Take 0.25 mcg by mouth at bedtime.   carvedilol (COREG) 6.25 MG tablet   No No   Sig: Take 1 tablet (6.25 mg) by mouth at bedtime.   cinacalcet (SENSIPAR) 60 MG tablet  Self Yes No   Sig: Take 60 mg by mouth. Take 1 tablet (60 mg) three times a week: Monday, Wednesday, and Friday nights   clopidogrel (PLAVIX) 75 MG tablet   Yes No   Sig: Take 75 mg by mouth every evening.   gentamicin (GARAMYCIN) 0.1 % external cream  Self Yes No   Sig: Apply topically daily. Apply topically on peritoneal access site to prevent infections   gentamicin (GARAMYCIN) 0.1 % external cream   No No   Sig: Apply topically daily as needed (promotion of catheter exit site healing.).   glucose 40 % (400 mg/mL) gel   No No   Sig: Take 15-30 g by mouth every 15 minutes as needed for low blood sugar.   insulin glargine (LANTUS PEN) 100 UNIT/ML pen   No No   Sig: Inject 35 Units subcutaneously at bedtime.   melatonin 5 MG tablet  Self Yes No   Sig: Take 5 mg by mouth at bedtime   omeprazole (PRILOSEC) 40 MG DR capsule  Self Yes No   Sig: Take 40 mg by mouth daily.   oxyCODONE (ROXICODONE) 5 MG tablet   No No   Sig: Take 1 tablet (5 mg) by mouth every 6 hours as needed for severe pain.   sacubitril-valsartan (ENTRESTO) 49-51 MG per tablet  Self Yes No   Sig: Take 1 tablet by mouth 2 times daily   torsemide (DEMADEX) 100 MG tablet  Self Yes No   Sig: Take 100 mg by mouth every morning   warfarin ANTICOAGULANT (COUMADIN) 5 MG tablet  Self Yes No   Sig: Take by mouth daily. Take 5 mg MWF nights & 1.5 tablet ROW      Facility-Administered Medications: None        Review of Systems    The 10 point Review of Systems is negative other than noted in the HPI or here.      Physical Exam   /87    Pulse 90   Temp 97  F (36.1  C) (Temporal)   Resp 12   SpO2 100%   Gen- pleasant   HEENT- NAD, GORDON  Neck- supple  CVS- I+II+ no m/r/g  RS- CTAB  Abdo- soft, no tenderness. No g/r/r   Ext- no edema           Left great toe covered with dressing.      Medical Decision Making       80 MINUTES SPENT BY ME on the date of service doing chart review, history, exam, documentation & further activities per the note.      Data   ------------------------- PAST 24 HR DATA REVIEWED -----------------------------------------------    I have personally reviewed the following data over the past 24 hrs:    7.7  \   11.4 (L)   / 395     129 (L) 89 (L) 41.0 (H) /  150 (H)   4.9 26 7.58 (H) \     Trop: 564 (HH) BNP: 18,739 (H)     TSH: 6.45 (H) T4: 1.38 A1C: N/A     Procal: N/A CRP: N/A Lactic Acid: 2.1 (H)       INR:  1.55 (H) PTT:  32   D-dimer:  N/A Fibrinogen:  N/A       Imaging results reviewed over the past 24 hrs:   Recent Results (from the past 24 hours)   XR Chest Port 1 View    Narrative    CHEST ONE VIEW PORTABLE  2024 10:48 AM     HISTORY: Weakness.    COMPARISON: None.      Impression    IMPRESSION: No acute cardiopulmonary disease.    EMERSON ROSENBERG MD         SYSTEM ID:  [id number]   Echocardiogram Limited   Result Value    Biplane LVEF 30%    Narrative    [id number]  [id number]  [id number]  [id number]     [facility]  Echocardiography Laboratory  [address] [place] [address]     Name: WILLA MITTAL  MRN: [id number]  : 1960  Study Date: 2024 12:16 PM  Age: 64 yrs  Gender: Male  Patient Location: [facility]  Reason For Study: CAD  Ordering Physician: SHRUTHI CORTES  Referring Physician: Chai Griffin  Performed By: Daniel Mi RDCS     BSA: 2.0 m2  Height: 73 in  Weight: 163 lb  HR: 90  BP: 118/80 mmHg  ______________________________________________________________________________  Procedure  Limited Portable Echo Adult. Definity (NDC #05500-109) given  intravenously.  ______________________________________________________________________________  Interpretation Summary     Left ventricular systolic function is severely reduced.  Biplane LVEF is 30%.  There is severe global hypokinesia of the left ventricle.  The right ventricular systolic function is mildly reduced.  No hemodynamically significant valvular aortic stenosis.  The study was technically difficult. The study was technically limited. There  is no comparison study available.  ______________________________________________________________________________  Left Ventricle  The left ventricle is normal in size. There is normal left ventricular wall  thickness. Left ventricular systolic function is severely reduced. Biplane  LVEF is 30%. Left ventricular diastolic function is indeterminate. There is  severe global hypokinesia of the left ventricle.     Right Ventricle  The right ventricular systolic function is mildly reduced.     Mitral Valve  There is moderate mitral annular calcification. The mitral valve leaflets are  mildly thickened. The mitral valve is not well visualized. There is trace  mitral regurgitation.     Tricuspid Valve  No tricuspid regurgitation. Right ventricular systolic pressure could not be  approximated due to inadequate tricuspid regurgitation. IVC diameter <2.1 cm  collapsing >50% with sniff suggests a normal RA pressure of 3 mmHg.     Aortic Valve  There is moderate trileaflet aortic sclerosis. There is trace aortic  regurgitation. No hemodynamically significant valvular aortic stenosis. The  mean AoV pressure gradient is 8.4 mmHg.     Pulmonic Valve  The pulmonic valve is not well visualized.  ______________________________________________________________________________  MMode/2D Measurements & Calculations  IVSd: 1.0 cm  LVIDd: 5.3 cm  LVIDs: 4.6 cm  LVPWd: 0.93 cm  FS: 13.5 %  LV mass(C)d: 195.0 grams  LV mass(C)dI: 98.9 grams/m2  Ao root diam: 3.5 cm  LA dimension: 3.7  cm  LA/Ao: 1.0  LVOT diam: 2.2 cm  LVOT area: 3.8 cm2  Ao root diam index Ht(cm/m): 1.9  Ao root diam index BSA (cm/m2): 1.8  EF Biplane: 29.7 %  RWT: 0.35     Doppler Measurements & Calculations  MV max P.9 mmHg  MV mean P.4 mmHg  MV V2 VTI: 27.6 cm  MVA(VTI): 2.7 cm2     Ao V2 max: 186.5 cm/sec  Ao max P.0 mmHg  Ao V2 mean: 137.7 cm/sec  Ao mean P.4 mmHg  Ao V2 VTI: 34.9 cm  DAVID(I,D): 2.2 cm2  DAVID(V,D): 2.3 cm2  LV V1 max P.2 mmHg  LV V1 max: 113.7 cm/sec  LV V1 VTI: 20.0 cm  SV(LVOT): 75.8 ml  SI(LVOT): 38.4 ml/m2  AV Saul Ratio (DI): 0.61  DAVID Index (cm2/m2): 1.1     ______________________________________________________________________________  Report approved by: Heidy Fernando 2024 01:13 PM

## 2024-11-27 NOTE — PROGRESS NOTES
[facility]        Patient is here for a post-op to discuss 10/27/24 - LEFT FEMORAL ENDARTERECTOMY, LEFT FEMORAL TO TIBIAL PERONEAL TRUNK BYPASS WITH LEFT GREAT SEPHANEOUS VEIN, WOUND VAC PLACEMENT ON LEFT GROIN with Dr. Gray.     Pt is currently taking Plavix.    BP (!) 62/48 (BP Location: Left arm, Patient Position: Sitting, Cuff Size: Adult Regular)   Pulse 50   SpO2 100%     The provider has been notified that the patient has no concerns.     Questions patient would like addressed today are: N/A.    Refills are needed: No    Has homecare services and agency name:  No     Patient has been identified as a fall risk.    Interventions were completed as noted below:  [x]Exam tables/chairs remained in the lowest position.   [x]Patient remained in wheelchair.    []Provider requested patient in exam chair.  Patient required assist and use of transfer belt.  [x]Exam room door remained open unless with a provider.  []A bell provided to patient to notify staff if assistance was needed.  [x]Provider notified patient was identified as a fall risk.    Pt became symptomatic during the rooming process c/o lightheadedness and could hardly keep his head up. Pt arrived to clinic alone in w'c with walker. See vitals tab. Cecilia was notified of low BP and HR and temp and advised RN to bring pt to [facility]. RN gave report to triage nurse and stayed with pt until he was able to be seen by triage nurse and assessed.    Calli Moser RN  [facility]

## 2024-11-27 NOTE — PROGRESS NOTES
Discussed with cardiology: Concern for cardiogenic shock and patient being taken emergently for cardiac cath and recommend dobutamine gtt. postprocedure.    [facility] ICU: Discussed with ICU staff, no beds available  Cardiac CCU: Can only do fixed dose dobutamine    Long discussion with SOC ICU staff.  -No ICU beds in the system, if patient definitely needs ICU level support then needs to be transferred out of system.    Discussed again with cardiology.  Post coronary angiogram, will take patient to CCU on fixed dose dobutamine 5 mcg/kg/h

## 2024-11-28 ENCOUNTER — APPOINTMENT (OUTPATIENT)
Dept: ULTRASOUND IMAGING | Facility: CLINIC | Age: 64
DRG: 280 | End: 2024-11-28
Attending: STUDENT IN AN ORGANIZED HEALTH CARE EDUCATION/TRAINING PROGRAM
Payer: MEDICARE

## 2024-11-28 ENCOUNTER — APPOINTMENT (OUTPATIENT)
Dept: PHYSICAL THERAPY | Facility: CLINIC | Age: 64
DRG: 280 | End: 2024-11-28
Attending: INTERNAL MEDICINE
Payer: MEDICARE

## 2024-11-28 LAB
ANION GAP SERPL CALCULATED.3IONS-SCNC: 12 MMOL/L (ref 7–15)
BACTERIA BLD CULT: NORMAL
BUN SERPL-MCNC: 40.5 MG/DL (ref 8–23)
CALCIUM SERPL-MCNC: 8.6 MG/DL (ref 8.8–10.4)
CHLORIDE SERPL-SCNC: 94 MMOL/L (ref 98–107)
CREAT SERPL-MCNC: 7.23 MG/DL (ref 0.67–1.17)
EGFRCR SERPLBLD CKD-EPI 2021: 8 ML/MIN/1.73M2
ERYTHROCYTE [DISTWIDTH] IN BLOOD BY AUTOMATED COUNT: 19.5 % (ref 10–15)
GLUCOSE BLDC GLUCOMTR-MCNC: 130 MG/DL (ref 70–99)
GLUCOSE BLDC GLUCOMTR-MCNC: 144 MG/DL (ref 70–99)
GLUCOSE SERPL-MCNC: 151 MG/DL (ref 70–99)
HCO3 SERPL-SCNC: 24 MMOL/L (ref 22–29)
HCT VFR BLD AUTO: 31.6 % (ref 40–53)
HGB BLD-MCNC: 10.5 G/DL (ref 13.3–17.7)
INR PPP: 1.76 (ref 0.85–1.15)
MCH RBC QN AUTO: 31.1 PG (ref 26.5–33)
MCHC RBC AUTO-ENTMCNC: 33.2 G/DL (ref 31.5–36.5)
MCV RBC AUTO: 94 FL (ref 78–100)
PLATELET # BLD AUTO: 281 10E3/UL (ref 150–450)
POTASSIUM SERPL-SCNC: 4.1 MMOL/L (ref 3.4–5.3)
RBC # BLD AUTO: 3.38 10E6/UL (ref 4.4–5.9)
SODIUM SERPL-SCNC: 130 MMOL/L (ref 135–145)
WBC # BLD AUTO: 5.9 10E3/UL (ref 4–11)

## 2024-11-28 PROCEDURE — 36415 COLL VENOUS BLD VENIPUNCTURE: CPT | Performed by: INTERNAL MEDICINE

## 2024-11-28 PROCEDURE — 90999 UNLISTED DIALYSIS PROCEDURE: CPT

## 2024-11-28 PROCEDURE — 210N000001 HC R&B IMCU HEART CARE

## 2024-11-28 PROCEDURE — 85610 PROTHROMBIN TIME: CPT | Performed by: INTERNAL MEDICINE

## 2024-11-28 PROCEDURE — 93922 UPR/L XTREMITY ART 2 LEVELS: CPT

## 2024-11-28 PROCEDURE — 250N000013 HC RX MED GY IP 250 OP 250 PS 637: Performed by: INTERNAL MEDICINE

## 2024-11-28 PROCEDURE — 97530 THERAPEUTIC ACTIVITIES: CPT | Mod: GP

## 2024-11-28 PROCEDURE — 250N000011 HC RX IP 250 OP 636: Performed by: INTERNAL MEDICINE

## 2024-11-28 PROCEDURE — 82565 ASSAY OF CREATININE: CPT | Performed by: INTERNAL MEDICINE

## 2024-11-28 PROCEDURE — 85027 COMPLETE CBC AUTOMATED: CPT | Performed by: INTERNAL MEDICINE

## 2024-11-28 PROCEDURE — 250N000012 HC RX MED GY IP 250 OP 636 PS 637: Performed by: INTERNAL MEDICINE

## 2024-11-28 PROCEDURE — 93925 LOWER EXTREMITY STUDY: CPT

## 2024-11-28 PROCEDURE — 90945 DIALYSIS ONE EVALUATION: CPT | Performed by: INTERNAL MEDICINE

## 2024-11-28 PROCEDURE — 80048 BASIC METABOLIC PNL TOTAL CA: CPT | Performed by: INTERNAL MEDICINE

## 2024-11-28 PROCEDURE — 99232 SBSQ HOSP IP/OBS MODERATE 35: CPT | Performed by: INTERNAL MEDICINE

## 2024-11-28 PROCEDURE — 97161 PT EVAL LOW COMPLEX 20 MIN: CPT | Mod: GP

## 2024-11-28 RX ORDER — DOBUTAMINE HYDROCHLORIDE 200 MG/100ML
5 INJECTION INTRAVENOUS CONTINUOUS
Status: DISCONTINUED | OUTPATIENT
Start: 2024-11-28 | End: 2024-11-29

## 2024-11-28 RX ORDER — SEVELAMER CARBONATE 800 MG/1
800 TABLET, FILM COATED ORAL
Status: COMPLETED | OUTPATIENT
Start: 2024-11-28 | End: 2024-11-28

## 2024-11-28 RX ORDER — WARFARIN SODIUM 5 MG/1
5 TABLET ORAL
Status: COMPLETED | OUTPATIENT
Start: 2024-11-28 | End: 2024-11-28

## 2024-11-28 RX ORDER — GENTAMICIN SULFATE 1 MG/G
CREAM TOPICAL DAILY PRN
Status: DISCONTINUED | OUTPATIENT
Start: 2024-11-28 | End: 2024-11-28

## 2024-11-28 RX ORDER — SEVELAMER CARBONATE 800 MG/1
800 TABLET, FILM COATED ORAL
Status: DISCONTINUED | OUTPATIENT
Start: 2024-11-28 | End: 2024-12-03 | Stop reason: HOSPADM

## 2024-11-28 RX ORDER — GENTAMICIN SULFATE 1 MG/G
OINTMENT TOPICAL DAILY PRN
Status: DISCONTINUED | OUTPATIENT
Start: 2024-11-28 | End: 2024-12-03 | Stop reason: HOSPADM

## 2024-11-28 RX ADMIN — PANTOPRAZOLE SODIUM 40 MG: 40 TABLET, DELAYED RELEASE ORAL at 09:14

## 2024-11-28 RX ADMIN — WARFARIN SODIUM 5 MG: 5 TABLET ORAL at 18:06

## 2024-11-28 RX ADMIN — DOBUTAMINE HYDROCHLORIDE 5 MCG/KG/MIN: 200 INJECTION INTRAVENOUS at 22:33

## 2024-11-28 RX ADMIN — SEVELAMER CARBONATE 800 MG: 800 TABLET, FILM COATED ORAL at 09:39

## 2024-11-28 RX ADMIN — INSULIN GLARGINE 20 UNITS: 100 INJECTION, SOLUTION SUBCUTANEOUS at 23:28

## 2024-11-28 RX ADMIN — CALCITRIOL CAPSULES 0.25 MCG 0.25 MCG: 0.25 CAPSULE ORAL at 22:32

## 2024-11-28 RX ADMIN — SEVELAMER CARBONATE 800 MG: 800 TABLET, FILM COATED ORAL at 18:06

## 2024-11-28 RX ADMIN — SEVELAMER CARBONATE 800 MG: 800 TABLET, FILM COATED ORAL at 13:27

## 2024-11-28 RX ADMIN — ONDANSETRON 4 MG: 2 INJECTION, SOLUTION INTRAMUSCULAR; INTRAVENOUS at 18:13

## 2024-11-28 RX ADMIN — GENTAMICIN SULFATE: 1 OINTMENT TOPICAL at 23:19

## 2024-11-28 RX ADMIN — ALLOPURINOL 200 MG: 100 TABLET ORAL at 22:33

## 2024-11-28 RX ADMIN — CLOPIDOGREL BISULFATE 75 MG: 75 TABLET ORAL at 22:32

## 2024-11-28 RX ADMIN — ATORVASTATIN CALCIUM 40 MG: 40 TABLET, FILM COATED ORAL at 22:32

## 2024-11-28 ASSESSMENT — ACTIVITIES OF DAILY LIVING (ADL)
ADLS_ACUITY_SCORE: 62
ADLS_ACUITY_SCORE: 60
ADLS_ACUITY_SCORE: 60
ADLS_ACUITY_SCORE: 59
ADLS_ACUITY_SCORE: 60
ADLS_ACUITY_SCORE: 60
ADLS_ACUITY_SCORE: 59
ADLS_ACUITY_SCORE: 60
ADLS_ACUITY_SCORE: 59
ADLS_ACUITY_SCORE: 59
ADLS_ACUITY_SCORE: 62
ADLS_ACUITY_SCORE: 60
ADLS_ACUITY_SCORE: 60
ADLS_ACUITY_SCORE: 59
ADLS_ACUITY_SCORE: 62
ADLS_ACUITY_SCORE: 60
ADLS_ACUITY_SCORE: 59
ADLS_ACUITY_SCORE: 59

## 2024-11-28 NOTE — PROGRESS NOTES
Renal Medicine Progress Note            Assessment/Plan:     Arnulfo Pritchett is a 64 year old male CAD, HTN, HLD, pafib, HFrEF (EF 20-25%), ESRD on PD, DM2, TALI, RCC s/p R nephrectomy (2022), RAD with multiple LE procedures who was admitted on 11/27/2024 with severe hypotension and generalized weakness.     Assessment:  1) hypotension: Appears all hypovolemic nature.  Patient does not adjust his PD solution has been doing although 2.5% solutions without adjustments.  On his own he was decreasing carvedilol dosing and frequency but appears worsened hypotension recently.  Discussed that he needs to adjust the dialysate and associated ultrafiltration when he is having lower blood pressures.    Agree with weaning off dobutamine.  Would give additional 250 cc's normal saline boluses with low blood pressures.    2) end stage renal disease on PD  Chronic PD for last 3.5 years.  Home unit [facility], nephrologist Dr. May  Rx: 5 cycles, 2 L fill volumes, 9 hours, last fill 1.2 L with external.  Target weight reported at 79 kg.  Has had significant weight loss over the last number of months.    Anemia in goal range, 10.5  Noted mild hyponatremia 130    Other:  A-fib on warfarin  Known HFrEF, status post coronary angiogram with no interventions  PAD, status post left lower extremity revascularization 10/25/2024    Plan/Recs:  1) PD again tonight, will also use same 1.5% dextrose solutions  2) will send PD fluid as ordered for cell count, culture.  3) okay to give additional isotonic fluid boluses with hypotension while weaning dobutamine.    Duncan Lomeli DO  [facility] consultants  Office: [phone]  Cell: [phone]        Interval History:     Patient with improved hemodynamics.  Remains on dobutamine though at 5 mg/kg/h.    PD uneventful, ran on yellow 1.5% dextrose dialysate bags, -537 ultrafiltration.  Mildly tachycardic,    107 this morning.  Blood pressure 120 systolic.  Patient feeling improved.           Medications and Allergies:     Current Facility-Administered Medications   Medication Dose Route Frequency Provider Last Rate Last Admin    allopurinol (ZYLOPRIM) tablet 200 mg  200 mg Oral QPM Maurizio Glover MD   200 mg at 11/27/24 2122    atorvastatin (LIPITOR) tablet 40 mg  40 mg Oral At Bedtime Maurizio Glover MD   40 mg at 11/27/24 2122    calcitRIOL (ROCALTROL) capsule 0.25 mcg  0.25 mcg Oral At Bedtime Maurizio Glover MD   0.25 mcg at 11/27/24 2122    cinacalcet (SENSIPAR) tablet 60 mg  60 mg Oral Once per day on Monday Wednesday Friday Maurizio Glover MD   60 mg at 11/27/24 2122    clopidogrel (PLAVIX) tablet 75 mg  75 mg Oral QPM Maurizio Glover MD   75 mg at 11/27/24 2122    insulin glargine (LANTUS PEN) injection 20 Units  20 Units Subcutaneous At Bedtime Maurizio Glover MD        pantoprazole (PROTONIX) EC tablet 40 mg  40 mg Oral QAM AC Maurizio Glover MD   40 mg at 11/28/24 0914    sevelamer carbonate (RENVELA) tablet 800 mg  800 mg Oral TID w/meals Maurizio Glover MD        sodium chloride (PF) 0.9% PF flush 3 mL  3 mL Intracatheter Q8H Tamy Guthrie PA-C   3 mL at 11/28/24 0939      No Known Allergies         Physical Exam:   Vitals were reviewed  /79   Pulse 108   Temp 97.6  F (36.4  C) (Oral)   Resp 16   Wt 81 kg (178 lb 9.2 oz)   SpO2 97%   BMI 24.45 kg/m      Wt Readings from Last 3 Encounters:   11/28/24 81 kg (178 lb 9.2 oz)   11/26/24 76.2 kg (168 lb)   11/09/24 76.5 kg (168 lb 10.4 oz)       Intake/Output Summary (Last 24 hours) at 11/28/2024 1147  Last data filed at 11/28/2024 0939  Gross per 24 hour   Intake 2023 ml   Output 450 ml   Net 1573 ml     GENERAL: NAD   HEENT:  Normocephalic. Dry MM   CV: irregularly irregular  RESP: Clear anteriorly  GI: soft, nontender, nondistended. PD cahteter clean and intact   MUSCULOSKELETAL: trace LE edema  NEURO:   Alert, oriented, normal speech  PSYCH: mood good, affect appropriate              Data:     BMP  Recent Labs   Lab  "11/28/24  0535 11/28/24  0154 11/27/24 2243 11/27/24 2024 11/27/24  1949 11/27/24  1035   *  --   --   --   --  129*   POTASSIUM 4.1  --   --   --   --  4.9   CHLORIDE 94*  --   --   --   --  89*   TERENCE 8.6*  --   --   --   --  9.2   CO2 24  --   --   --   --  26   BUN 40.5*  --   --   --   --  41.0*   CR 7.23*  --   --   --   --  7.58*   * 130* 127* 74   < > 150*    < > = values in this interval not displayed.     CBC  Recent Labs   Lab 11/28/24  0535 11/27/24  1035   WBC 5.9 7.7   HGB 10.5* 11.4*   HCT 31.6* 35.6*   MCV 94 100    395     No results found for: \"AST\", \"ALT\", \"GGT\", \"ALKPHOS\", \"BILITOTAL\", \"BILICONJ\", \"BILIDIRECT\", \"SHELTON\"  Lab Results   Component Value Date    INR 1.55 (H) 11/27/2024       Attestation:  I have reviewed today's vital signs, notes, medications, labs and imaging.    DO [facility] - Nephrology  Office: [phone]  Cell: [phone]    "

## 2024-11-28 NOTE — PROGRESS NOTES
11/28/24 0903   Appointment Info   Signing Clinician's Name / Credentials (PT) Dominique Carpenter, PT, DPT   Living Environment   People in Home alone   Current Living Arrangements apartment   Home Accessibility no concerns   Transportation Anticipated car, drives self;family or friend will provide   Living Environment Comments Pt lives alone in apartment with elevator access but has been at [facility]CU for rehab for about 3 weeks.   Self-Care   Usual Activity Tolerance fair   Current Activity Tolerance poor   Regular Exercise No   Equipment Currently Used at Home cane, straight   Fall history within last six months no   Activity/Exercise/Self-Care Comment Pt has been using a walker at TCU, participating in PT and OT, at baseline is independent with ADLs and ambulates with cane.   General Information   Onset of Illness/Injury or Date of Surgery 11/27/24   Referring Physician Maurizio Glover MD   Patient/Family Therapy Goals Statement (PT) To get stronger   Pertinent History of Current Problem (include personal factors and/or comorbidities that impact the POC) Pt is 64 year old male adm on 11/27/24 with generalized weakness and hypotension. Pt was recently hospitalized 10/15/24-11/9/24 for critical limb ischemia of left lower extremity and ischemic left great toe wound, underwent surgery and was discharged to TCU. Pt was seen in clinic for follow up post surgery and was found to be hypotensive with SBP in 50's so sent to ED. Pt underwent emergent cardiac cath which showed stable moderate to severe distal coronary artery disease with findings suggestive hypotension was due to hypovolemia not heart failure or NSTEMI type 1. Pt started on dobutamine and admitted to CCU. PMH includes CAD with multiple stents, HTN, hyperlipidemia, chronic paroxysmal a-fib s/p ablation, ESRD on PD, secondary hyperparathyroidism, RCC s/p nephrectomy, DM type II, gout, GERD, TALI   Existing Precautions/Restrictions fall   Cognition   Affect/Mental  Status (Cognition) WFL   Orientation Status (Cognition) oriented x 3   Follows Commands (Cognition) WFL   Pain Assessment   Patient Currently in Pain Yes, see Vital Sign flowsheet  (L LE pain throughout session)   Integumentary/Edema   Integumentary/Edema Comments L LE with post-op changes   Posture    Posture Not impaired   Range of Motion (ROM)   Range of Motion ROM is WFL   Strength (Manual Muscle Testing)   Strength (Manual Muscle Testing) Deficits observed during functional mobility   Strength Comments Overall deconditioning, L LE weakness after surgery   Bed Mobility   Comment, (Bed Mobility) SBA   Transfers   Comment, (Transfers) CGA with FWW   Gait/Stairs (Locomotion)   Comment, (Gait/Stairs) Unable to lexx gait at this time   Balance   Balance Comments Supervision for sitting balance, close supervision to min assist for standing balance with FWW   Clinical Impression   Criteria for Skilled Therapeutic Intervention Yes, treatment indicated   PT Diagnosis (PT) Impaired mobility   Influenced by the following impairments Increased pain, decreased activity tolerance, decreased strength, decreased balance   Functional limitations due to impairments Decreased independence with functional mobility tasks   Clinical Presentation (PT Evaluation Complexity) stable   Clinical Presentation Rationale Current presentation, [facility]   Clinical Decision Making (Complexity) low complexity   Planned Therapy Interventions (PT) balance training;bed mobility training;gait training;home exercise program;patient/family education;strengthening;transfer training   Risk & Benefits of therapy have been explained evaluation/treatment results reviewed;care plan/treatment goals reviewed;risks/benefits reviewed;current/potential barriers reviewed;participants voiced agreement with care plan;participants included;patient   PT Total Evaluation Time   PT Eval, Low Complexity Minutes (75139) 10   Physical Therapy Goals   PT Frequency 5x/week   PT  Predicted Duration/Target Date for Goal Attainment 12/07/24   PT: Bed Mobility Independent;Supine to/from sit;Rolling   PT: Transfers Modified independent;Sit to/from stand;Bed to/from chair;Assistive device   PT: Gait Modified independent;Rolling walker;50 feet   Therapeutic Activity   Therapeutic Activities: dynamic activities to improve functional performance Minutes (75138) 24   Symptoms Noted During/After Treatment Fatigue;Increased pain   Treatment Detail/Skilled Intervention Pt just finishing PD treatment on arrival, able to disconnect self with min assist to manage other lines. Pt then moves supine to sit at EOB with SBA. Pt maintains sitting at EOB with supervision, denies dizziness/lightheadedness. Pt easily overwhelmed by IVs and monitor wires, needs reassurance throughout. Pt sit to stand from EOB x2 reps with SBA to CGA using FWW, minimal weight bearing through L LE. On second standing trial pt performs 3 reps of forward/backward toe taps and standing marching but only able to lexx moving L LE, cannot tolerate fully weight shifting onto L LE to perform exercises on R LE. Pt stating he is unable to amulate at this time and returns to supine in bed on own. Pt assisted with repositioning, needs within reach, bed alarm activated at completion of session. RN updated.   PT Discharge Planning   PT Plan Genreal strengthening, progress independence with transfers, ambulation as lexx   PT Discharge Recommendation (DC Rec) Transitional Care Facility   PT Rationale for DC Rec Pt is below baseline level of function, anticipate need for continued inpatient rehab when medically stable for discharge from hospital.   PT Brief overview of current status Goals of therapy will be to address safe mobility and make recs for d/c to next level of care. Pt and RN will continue to follow all falls risk precautions as documented by RN staff while hospitalized   Physical Therapy Time and Intention   Timed Code Treatment Minutes 24    Total Session Time (sum of timed and untimed services) 34

## 2024-11-28 NOTE — PROGRESS NOTES
Patient sat in the chair for 1 hour tolerated food, denies pain, VSS, RA. Pain in the left leg with activity. Continue to monitor.

## 2024-11-28 NOTE — PROGRESS NOTES
CCPD Cycler Set-up:     Cycler #: [id number]  Solution(s): 1.5% x 2 bags, 6 L each (Lot#: [id number], Exp: 7/26)  Cassette/Lines: Standard (Lot#: [id number], Exp: 08/18/27)  Number of hours on cycler: 9 hours  Dwell Time: 1 hr 20 min  Total Daily fill volume: 25610 mL  Fill volume: 2000 mL  Final fill volume: 0  Total # exchanges: 5    A/O x 3, denies chest pain or other discomfort. Denies SOB on room air.   Machine tested and pt ready to be connected.   Report given to GENEVA Vance RN.     Zoë Dalton RN on 11/27/2024 at 10:48 PM  [facility]

## 2024-11-28 NOTE — PROGRESS NOTES
Cycler set up per protocol and per treatment orders     Results from previous treatment:  Effluent color and clarity: yellow and clear, no fibrin  Current UF: -522 ml  Initial Drain: 1452 ml  Avg Dwell: 1:06  Lost Dwell: 0  Drain volume: 2283 ml    Cycler Serial Number: [id number]  Total Treatment Volume: 73587 mL  Total treatment time: 9 hrs  Fill Volume: 2000 ml  Last Fill Volume:0ml  Heater ba.5 % 6000mL;  Lot #: [id number];  Expiration Date:   Side Bag #1: 1.5 % 6000mL;  Lot #: [id number];  Expiration Date:   Number of cycles including final fill: 5  Dwell Time: 0 minutes  PD orders reviewed with Patient procedure and ESRD teaching done and questions answered.  Cycler ready for hook-up.    Report given to: 2nd floor nurse  Fredy consent form signed yes

## 2024-11-28 NOTE — PROGRESS NOTES
Vascular Surgery Progress Note    NAEON. Patient lying comfortably in bed this AM. Denies CP, SOB overnight. BP stable. Currently on dobutamine at 5 mcg/hr.     On exam, left groin incision has some superficial level dehiscence with serous drainage, but wound bed appears healthy without signs of infection. Medial left leg incision with staples in place, no drainage or SOI. Left great toe wound with dressings in place, appears stable compared to pictures taken previously in patient chart. Monophasic DP/PT BL.     A/P: S/p left femoral endarterectomy and fem-TPT bypass 10/25/27, s/p bypass revision with ligation of side branches 10/27/24. Admitted from clinic for symptomatic hypotension on 11/27/24. S/p heart cath 11/27 demonstrating MVD.     - Continue Q4 doppler checks to BLE.  - Local wound care, dressing changes to L groin/leg incisions and toe wound.   - Continue ASA/plavix.  - Arterial DUS, VLADIMIR US ordered for tomorrow to evaluate distal blood flow given presence of non-healing wound.     D/w Dr. Medrano.       Nataly Ribeiro MD  PGY-6  Vascular Surgery  Pager: [phone]    Please page me directly with any questions/concerns during regular weekday hours before 5PM. If there is no response, if it is a weekend, or if it is during evening hours, then please use the [facility] system to page the first-call resident on call for vascular surgery.

## 2024-11-28 NOTE — PHARMACY-ANTICOAGULATION SERVICE
Clinical Pharmacy - Warfarin Dosing Consult     Pharmacy has been consulted to manage this patient s warfarin therapy.  Indication: Atrial Fibrillation  Therapy Goal: INR 2-3  Warfarin Prior to Admission: Yes  Warfarin PTA Regimen: 3mg MThFSS. 4mg TuW    INR   Date Value Ref Range Status   11/28/2024 1.76 (H) 0.85 - 1.15 Final   11/27/2024 1.55 (H) 0.85 - 1.15 Final       Recommend warfarin 5 mg today.  Pharmacy will monitor Arnulfo D Shreyas daily and order warfarin doses to achieve specified goal.      Please contact pharmacy as soon as possible if the warfarin needs to be held for a procedure or if the warfarin goals change.

## 2024-11-28 NOTE — PROGRESS NOTES
[facility]    Medicine Progress Note - Hospitalist Service    Date of Admission:  11/27/2024    Assessment & Plan      Arnulfo Pritchett is a 64 year old male with history of coronary artery disease with multiple stents, hypertension, hyperlipidemia, chronic paroxysmal A-fib  (s/p ablation), ESRD on PD, secondary hyperparathyroidism, RCC  (s/p nephrectomy), DM type II, gout, GERD, TALI who was recently admitted to the hospital from 10/15 - 11/9 for critical limb ischemia of the left lower extremity and ischemic left great toe wound (see details of surgery below ), now is being admitted on 11/27/2024 with generalized weakness.          # Hypotension on admission: Responded to 1 unit PRBC, 1 L normal saline  # Lactic acidosis  # Non-STEMI versus nonischemic myocardial injury due to significant hypotension (recent nuclear test with no ischemia ), admission troponin 564 and BNP of 18, 739   # History ofcoronary artery disease with multiple stents (last in 2021)  - * Echo 6/2024 showed LVEF 20-25%, segmental wall motion globally hypokinetic.   * Nuc stress test 10/22 was abnormal but no evidence of ischemia; medium sized area of infarction in the entire inferolateral segment(s) of the left ventricle extending into basal inferior segment; small area of nontransmural infarction in the apical segment(s) of the left ventricle; TID absent; left ventricular ejection fraction at stress 26%.     -Cardiology consulted.   -Initial concern for cardiogenic shock, followed by emergent diagnostic coronary angiogram which showed stable moderate to severe distal coronary artery disease.  No significant lesions in the left main or any proximal or ostial lesions.  -Patient has been started on IV dobutamine at 5 mg/h which has been continued overnight.  -Discussed with cardiology continue IV dobutamine with no changes overnight  -Cardiology to see and evaluate patient tomorrow a.m. for any further recommendations or  change in management.  Case discussed with on-call cardiologist.     # Chronic A-fib:   Rate controlled: Hold PTA carvedilol  Anticoagulation: PTA on Coumadin.  Admission INR of 5.55.  -Patient was initially started on IV heparin which has been stopped per cardiology recommendations  -Restart warfarin once okay per vascular surgery.     #recent admission from 10/15 - 11/9 with Critical limb ischemia of the left lower extremity.  # S/p left femoral endarterectomy with bovine pericardial patch angioplasty; left distal common femoral artery/proximal superficial femoral artery to tibioperoneal trunk bypass; and temporary closure of left groin wound with wound VAC 10/25/2024.  # S/p ligation of side branches of the left great saphenous vein and temporary closure of right groin with application of wound VAC 10/27/2024.  Ischemic left great toe wound - s/p amputation of left great toe 10/27/2024.  Left groin wound infection.  H/o acute RLE arterial occlusion s/p SFA popliteal balloon angioplasty and stenting (5/2024).  -Appreciate vascular surgery review in ER.  No concerns for active infection  -Continue cares per vascular surgery  -Every 4 Doppler checks to bilateral lower extremity.  -Dressing changes to the left groin and leg incisions along with toe wound and continue aspirin 81 mg, Plavix 75 mg daily  -VLADIMIR ultrasound on 11/29     # ESKD secondary to diabetes mellitus nephropathy on PD  # Secondary hyperparathyroidism  -Nephrology consult  -Continue ongoing peritoneal dialysis.  Next episode tonight  -PD fluid for cell count and culture pending dialysis tonight.     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 Indication: AMI (NSTEMI/ STEMI) (48 hours)  Code Status: Full Code      Clinically Significant Risk Factors         # Hyponatremia: Lowest Na = 129 mmol/L in last 2 days, will monitor as appropriate  # Hypochloremia: Lowest Cl = 89 mmol/L in last 2 days, will monitor as appropriate       # Coagulation Defect: INR = 1.55 (Ref range: 0.85 - 1.15) and/or PTT = 32 Seconds (Ref range: 22 - 38 Seconds), will monitor for bleeding    # Hypertension: Noted on problem list                # Financial/Environmental Concerns:           Social Drivers of Health    Tobacco Use: Medium Risk (11/27/2024)    Patient History     Smoking Tobacco Use: Former     Smokeless Tobacco Use: Never   Alcohol Use: Unknown (11/28/2018)    [facility]    AUDIT-C     Frequency of Alcohol Consumption: Monthly or less     Frequency of Binge Drinking: Never   Physical Activity: Unknown (10/8/2024)    Exercise Vital Sign     Days of Exercise per Week: 0 days   Social Connections: Unknown (10/8/2024)    Social Connection and Isolation Panel [NHANES]     Frequency of Social Gatherings with Friends and Family: Patient declined          Disposition Plan     Medically Ready for Discharge: Anticipated in 2-4 Days             Dima Romo MD  Hospitalist Service  [facility]  Securely message with [employer] (more info)  Text page via [facility] Paging/Directory   ______________________________________________________________________    Interval History   Patient is resting in bed, currently on dobutamine drip, did not have any episodes of lightheadedness this morning but patient has been minimally active did not attempt to sit in the chair since afternoon.  Earlier in the day when she worked with physical therapy patient did not have any  symptoms.    Physical Exam   Vital Signs: Temp: 97.6  F (36.4  C) Temp src: Oral BP: 100/72 Pulse: 105   Resp: 16 SpO2: 97 % O2 Device: None (Room air) Oxygen Delivery: 2 LPM  Weight: 166 lbs 0 oz    Constitutional: Awake, alert, cooperative, no apparent distress  Respiratory: Clear to auscultation bilaterally, no crackles or wheezing  Cardiovascular: Regular rate and rhythm, normal S1 and S2, and no murmur noted  GI: Normal bowel sounds, soft, non-distended, non-tender  Skin/Integumen: Left groin incision with dressing in place.  Left great to dressing in place.  Other: Alert oriented x 3, no apparent focal deficits noted.

## 2024-11-28 NOTE — PROGRESS NOTES
Brief cardiology update.  Patient underwent emergent diagnostic coronary angiogram which showed stable moderate to severe distal coronary artery disease and no significant lesions in the left main or any proximal or ostial lesions.  LVEDP was normal at 11.  Blood pressures improving on pressors.  Per Dr. Spears's note yesterday, presentation appears to be noncardiac.  There may be some component of dehydration, diuresis and volume management per nephrology given ESRD on PD.  Recommend caution with fluid repletion given congestive heart failure. Will arrange close follow-up in the cardiology clinic, referral to CORE placed and team notified, CORE Rns will see tomorrow.    Tamy Guthrie PA-C

## 2024-11-28 NOTE — PLAN OF CARE
A&OX4, RA, denies pain, BP soft, on dobutamine gtt infusing at  5 mcg/kg/min. TR band off at ~ 0000, peritoneal dialysis overnight. BG 65/74/127. Wound on the LLE with staples, pulses weak, present with doppler. L groin wound covered with dressing, moderate drainage. Tele- ST  Plan: kept NPO since midnight

## 2024-11-29 VITALS
HEART RATE: 96 BPM | WEIGHT: 166 LBS | DIASTOLIC BLOOD PRESSURE: 80 MMHG | RESPIRATION RATE: 16 BRPM | SYSTOLIC BLOOD PRESSURE: 137 MMHG | OXYGEN SATURATION: 98 % | BODY MASS INDEX: 22.73 KG/M2 | TEMPERATURE: 98.2 F

## 2024-11-29 LAB
ANION GAP SERPL CALCULATED.3IONS-SCNC: 12 MMOL/L (ref 7–15)
BUN SERPL-MCNC: 38.2 MG/DL (ref 8–23)
CALCIUM SERPL-MCNC: 8.4 MG/DL (ref 8.8–10.4)
CHLORIDE SERPL-SCNC: 96 MMOL/L (ref 98–107)
CREAT SERPL-MCNC: 7.66 MG/DL (ref 0.67–1.17)
EGFRCR SERPLBLD CKD-EPI 2021: 7 ML/MIN/1.73M2
GLUCOSE BLDC GLUCOMTR-MCNC: 101 MG/DL (ref 70–99)
GLUCOSE BLDC GLUCOMTR-MCNC: 110 MG/DL (ref 70–99)
GLUCOSE BLDC GLUCOMTR-MCNC: 115 MG/DL (ref 70–99)
GLUCOSE BLDC GLUCOMTR-MCNC: 120 MG/DL (ref 70–99)
GLUCOSE BLDC GLUCOMTR-MCNC: 90 MG/DL (ref 70–99)
GLUCOSE SERPL-MCNC: 80 MG/DL (ref 70–99)
HCO3 SERPL-SCNC: 26 MMOL/L (ref 22–29)
INR PPP: 1.75 (ref 0.85–1.15)
POTASSIUM SERPL-SCNC: 4.4 MMOL/L (ref 3.4–5.3)
SODIUM SERPL-SCNC: 134 MMOL/L (ref 135–145)

## 2024-11-29 PROCEDURE — 36415 COLL VENOUS BLD VENIPUNCTURE: CPT | Performed by: INTERNAL MEDICINE

## 2024-11-29 PROCEDURE — 210N000002 HC R&B HEART CARE

## 2024-11-29 PROCEDURE — 90945 DIALYSIS ONE EVALUATION: CPT

## 2024-11-29 PROCEDURE — 82565 ASSAY OF CREATININE: CPT | Performed by: INTERNAL MEDICINE

## 2024-11-29 PROCEDURE — 99232 SBSQ HOSP IP/OBS MODERATE 35: CPT | Performed by: INTERNAL MEDICINE

## 2024-11-29 PROCEDURE — 99232 SBSQ HOSP IP/OBS MODERATE 35: CPT | Mod: FS | Performed by: PHYSICIAN ASSISTANT

## 2024-11-29 PROCEDURE — 82435 ASSAY OF BLOOD CHLORIDE: CPT | Performed by: INTERNAL MEDICINE

## 2024-11-29 PROCEDURE — 250N000013 HC RX MED GY IP 250 OP 250 PS 637: Performed by: INTERNAL MEDICINE

## 2024-11-29 PROCEDURE — 80048 BASIC METABOLIC PNL TOTAL CA: CPT | Performed by: INTERNAL MEDICINE

## 2024-11-29 PROCEDURE — 90945 DIALYSIS ONE EVALUATION: CPT | Performed by: INTERNAL MEDICINE

## 2024-11-29 PROCEDURE — 3E1M39Z IRRIGATION OF PERITONEAL CAVITY USING DIALYSATE, PERCUTANEOUS APPROACH: ICD-10-PCS | Performed by: STUDENT IN AN ORGANIZED HEALTH CARE EDUCATION/TRAINING PROGRAM

## 2024-11-29 PROCEDURE — 250N000011 HC RX IP 250 OP 636: Performed by: INTERNAL MEDICINE

## 2024-11-29 PROCEDURE — 85610 PROTHROMBIN TIME: CPT | Performed by: INTERNAL MEDICINE

## 2024-11-29 RX ORDER — GENTAMICIN SULFATE 1 MG/G
CREAM TOPICAL DAILY PRN
Status: DISCONTINUED | OUTPATIENT
Start: 2024-11-29 | End: 2024-11-30

## 2024-11-29 RX ORDER — NALOXONE HYDROCHLORIDE 0.4 MG/ML
0.2 INJECTION, SOLUTION INTRAMUSCULAR; INTRAVENOUS; SUBCUTANEOUS
Status: DISCONTINUED | OUTPATIENT
Start: 2024-11-29 | End: 2024-12-03 | Stop reason: HOSPADM

## 2024-11-29 RX ORDER — NALOXONE HYDROCHLORIDE 0.4 MG/ML
0.4 INJECTION, SOLUTION INTRAMUSCULAR; INTRAVENOUS; SUBCUTANEOUS
Status: DISCONTINUED | OUTPATIENT
Start: 2024-11-29 | End: 2024-12-03 | Stop reason: HOSPADM

## 2024-11-29 RX ORDER — WARFARIN SODIUM 3 MG/1
6 TABLET ORAL
Status: COMPLETED | OUTPATIENT
Start: 2024-11-29 | End: 2024-11-29

## 2024-11-29 RX ADMIN — CALCITRIOL CAPSULES 0.25 MCG 0.25 MCG: 0.25 CAPSULE ORAL at 22:22

## 2024-11-29 RX ADMIN — ATORVASTATIN CALCIUM 40 MG: 40 TABLET, FILM COATED ORAL at 21:39

## 2024-11-29 RX ADMIN — INSULIN GLARGINE 20 UNITS: 100 INJECTION, SOLUTION SUBCUTANEOUS at 21:40

## 2024-11-29 RX ADMIN — ONDANSETRON 4 MG: 2 INJECTION, SOLUTION INTRAMUSCULAR; INTRAVENOUS at 10:15

## 2024-11-29 RX ADMIN — ALLOPURINOL 200 MG: 100 TABLET ORAL at 21:37

## 2024-11-29 RX ADMIN — ACETAMINOPHEN 650 MG: 325 TABLET, FILM COATED ORAL at 04:09

## 2024-11-29 RX ADMIN — CINACALCET 60 MG: 30 TABLET ORAL at 11:57

## 2024-11-29 RX ADMIN — GENTAMICIN SULFATE: 1 OINTMENT TOPICAL at 22:22

## 2024-11-29 RX ADMIN — PANTOPRAZOLE SODIUM 40 MG: 40 TABLET, DELAYED RELEASE ORAL at 11:57

## 2024-11-29 RX ADMIN — SEVELAMER CARBONATE 800 MG: 800 TABLET, FILM COATED ORAL at 13:47

## 2024-11-29 RX ADMIN — WARFARIN SODIUM 6 MG: 3 TABLET ORAL at 17:42

## 2024-11-29 RX ADMIN — SEVELAMER CARBONATE 800 MG: 800 TABLET, FILM COATED ORAL at 17:42

## 2024-11-29 RX ADMIN — CLOPIDOGREL BISULFATE 75 MG: 75 TABLET ORAL at 21:37

## 2024-11-29 ASSESSMENT — ACTIVITIES OF DAILY LIVING (ADL)
ADLS_ACUITY_SCORE: 57
ADLS_ACUITY_SCORE: 57
ADLS_ACUITY_SCORE: 59
DEPENDENT_IADLS:: INDEPENDENT
ADLS_ACUITY_SCORE: 59
ADLS_ACUITY_SCORE: 59
ADLS_ACUITY_SCORE: 57
ADLS_ACUITY_SCORE: 57
ADLS_ACUITY_SCORE: 59
ADLS_ACUITY_SCORE: 40
ADLS_ACUITY_SCORE: 57
ADLS_ACUITY_SCORE: 59
ADLS_ACUITY_SCORE: 57
ADLS_ACUITY_SCORE: 40
ADLS_ACUITY_SCORE: 59
ADLS_ACUITY_SCORE: 59
ADLS_ACUITY_SCORE: 57
ADLS_ACUITY_SCORE: 57
ADLS_ACUITY_SCORE: 59
ADLS_ACUITY_SCORE: 57

## 2024-11-29 NOTE — PLAN OF CARE
A&OX4, RA, denies pain, VSS, on dobutamine gtt infusing at  2.5 mcg/kg/min. Peritoneal dialysis overnight, /101, Wound on the LLE with staples, pulses weak, present with doppler. L groin wound covered with dressing, moderate drainage. Tele- ST   On renal diet, up with assist of 1 with walker and belt. Peritoneal machine was beeping overnight

## 2024-11-29 NOTE — PLAN OF CARE
Goal Outcome Evaluation:      Plan of Care Reviewed With: patient      Patient is up in the chair for meals only 20 30 minutes  nausea at dinner time gave Zofran IV, Patient VSS blood pressure is going up on dobutamine drip, RA. Denies pain. Continue to monitor.

## 2024-11-29 NOTE — PROGRESS NOTES
Cycler set up per protocol and per treatment orders     Results from previous treatment:  Effluent color and clarity: Yellow,cleaer  Total UF: -336  Initial Drain: 470  Avg Dwell: 0:38  Lost Dwell:     Cycler Serial Number: [id number]  Total Treatment Volume: 10,000mL  Total treatment time: 9 hrs  Fill Volume: 2000ml  Last Fill Volume:0ml  Heater ba.5% 6000mL;  Lot #: [id number] Expiration Date:   Side Bag #1: 1.5% 6000mL;  Lot #: [id number];  Expiration Date:     Number of cycles including final fill: 5  Dwell Time: 1:20 minutes  Last Fill Volume: 0ml  Initial Drain Alarm: 0ml  PD orders reviewed with Patient procedure and ESRD teaching done and questions answered.  Cycler ready for hook-up.    Report given to: Marielle GREGG

## 2024-11-29 NOTE — PROGRESS NOTES
[facility]    Medicine Progress Note - Hospitalist Service    Date of Admission:  11/27/2024    Assessment & Plan   Arnulfo Pritchett is a 64 year old male with history of coronary artery disease with multiple stents, hypertension, hyperlipidemia, chronic paroxysmal A-fib  (s/p ablation), ESRD on PD, secondary hyperparathyroidism, RCC  (s/p nephrectomy), DM type II, gout, GERD, TALI who was recently admitted to the hospital from 10/15 - 11/9 for critical limb ischemia of the left lower extremity and ischemic left great toe wound (see details of surgery below ), now is being admitted on 11/27/2024 with generalized weakness.          # Hypotension on admission: Responded to 1 unit PRBC, 1 L normal saline  # Lactic acidosis  # Non-STEMI versus nonischemic myocardial injury due to significant hypotension (recent nuclear test with no ischemia ), admission troponin 564 and BNP of 18, 739   # History ofcoronary artery disease with multiple stents (last in 2021)  - * Echo 6/2024 showed LVEF 20-25%, segmental wall motion globally hypokinetic.   * Nuc stress test 10/22 was abnormal but no evidence of ischemia; medium sized area of infarction in the entire inferolateral segment(s) of the left ventricle extending into basal inferior segment; small area of nontransmural infarction in the apical segment(s) of the left ventricle; TID absent; left ventricular ejection fraction at stress 26%.     -Cardiology consulted.   -Initial concern for cardiogenic shock, followed by emergent diagnostic coronary angiogram which showed stable moderate to severe distal coronary artery disease.  No significant lesions in the left main or any proximal or ostial lesions.  -Patient has been started on IV dobutamine at 5 mg/h overnight titrated to 2.5 mg/h and has been discontinued on 11/29  -Continue to monitor blood pressure closely since patient is off IV dobutamine  -Consider to restart carvedilol and Entresto at half dose and  titrate as needed for blood pressure.  -Diuretic management according to nephrology.  -Possible revascularization likely as outpatient if patient continues to have symptoms.     # Chronic A-fib:   Rate controlled: Hold PTA carvedilol  Anticoagulation: PTA on Coumadin.  Admission INR of 5.55.  -Patient was initially started on IV heparin which has been stopped per cardiology recommendations  -Warfarin management per pharmacy recommendations.     #recent admission from 10/15 - 11/9 with Critical limb ischemia of the left lower extremity.  # S/p left femoral endarterectomy with bovine pericardial patch angioplasty; left distal common femoral artery/proximal superficial femoral artery to tibioperoneal trunk bypass; and temporary closure of left groin wound with wound VAC 10/25/2024.  # S/p ligation of side branches of the left great saphenous vein and temporary closure of right groin with application of wound VAC 10/27/2024.  Ischemic left great toe wound - s/p amputation of left great toe 10/27/2024.  Left groin wound infection.  H/o acute RLE arterial occlusion s/p SFA popliteal balloon angioplasty and stenting (5/2024).  -Appreciate vascular surgery review in ER.  No concerns for active infection  -Continue cares per vascular surgery  -Every 4 Doppler checks to bilateral lower extremity.  -Dressing changes to the left groin and leg incisions along with toe wound and continue aspirin 81 mg, Plavix 75 mg daily  -VLADIMIR ultrasound on 11/29 pending     # ESKD secondary to diabetes mellitus nephropathy on PD  # Secondary hyperparathyroidism  -Nephrology consult  -Continue ongoing peritoneal dialysis.  Next episode tonight  -Continue diuretic management according to nephrology.     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 Indication: AMI (NSTEMI/ STEMI) (48 hours)  Code Status: Full Code      Clinically Significant Risk Factors         # Hyponatremia: Lowest Na = 130 mmol/L in last 2 days, will monitor as appropriate  # Hypochloremia: Lowest Cl = 94 mmol/L in last 2 days, will monitor as appropriate          # Hypertension: Noted on problem list                # Financial/Environmental Concerns: none         Social Drivers of Health    Tobacco Use: Medium Risk (11/27/2024)    Patient History     Smoking Tobacco Use: Former     Smokeless Tobacco Use: Never   Alcohol Use: Unknown (11/28/2018)    Received from [facility], [facility]    AUDIT-C     Frequency of Alcohol Consumption: Monthly or less     Frequency of Binge Drinking: Never   Physical Activity: Unknown (10/8/2024)    Exercise Vital Sign     Days of Exercise per Week: 0 days   Social Connections: Unknown (10/8/2024)    Social Connection and Isolation Panel [NHANES]     Frequency of Social Gatherings with Friends and Family: Patient declined          Disposition Plan     Medically Ready for Discharge: Anticipated in 2-4 Days             Dima Romo MD  Hospitalist Service  [facility]  Securely message with [employer] (more info)  Text page via [employer] Paging/Directory   ______________________________________________________________________    Interval History   Patient is very tired since he could not sleep until 6 AM this morning since the peritoneal dialysis machine requiring some work overnight.  Patient denies feeling any dizziness or lightheadedness no nausea vomiting or abdominal pain.  IV dobutamine has been off since this morning.  Patient did sit in the chair and  ambulate yesterday despite which his symptoms of dizziness did not recur.    Physical Exam   Vital Signs: Temp: 98  F (36.7  C) Temp src: Oral BP: 120/85 Pulse: 89   Resp: 18 SpO2: 99 % O2 Device: None (Room air)    Weight: 174 lbs 2.61 oz    Constitutional: Awake, alert, cooperative, no apparent distress  Respiratory: Clear to auscultation bilaterally, no crackles or wheezing  Cardiovascular: Regular rate and rhythm, normal S1 and S2, and no murmur noted  GI: Normal bowel sounds, soft, non-distended, non-tender

## 2024-11-29 NOTE — PROGRESS NOTES
[facility]  Cardiology Progress Note  Date of Service: 11/29/2024  Primary Cardiologist: JENN Malik    Assessment & Plan   Arnulfo Pritchett is a 64 year old male who was admitted on 11/27/2024.  He has known severe PAD and recent critical limb ischemia necessitating vascular surgery 1 month ago, complicated by wound infection.  He has known CAD with prior STEMI with PCI to the RCA in 2017, PCI to the OM and left circumflex in 2021 and recent nuclear stress test this fall without evidence of ischemia.  Additionally he has end-stage renal disease on peritoneal dialysis, ischemic cardiomyopathy and severe heart failure with reduced ejection fraction, type 2 diabetes, AF and previous CVA.  On the day of admission he was seen in vascular surgery clinic and noted to be hypotensive to the 50s systolic.  He reported feeling unwell and generally off but no other symptoms.  Chest x-ray negative.  Troponin elevated just 564 and trended to 503.  NT proBNP 18,739.  No chest pain or shortness of breath.  Was noted to be anemic and received 1 unit of packed red blood cells.  Hemoglobin improved to 11 from 7.9. Received 1L IVF. Patient underwent emergent diagnostic coronary angiogram which showed stable moderate to severe distal coronary artery disease and no significant lesions in the left main or any proximal or ostial lesions. LVEDP was normal at 11. Blood pressures improving on pressors.  Presentation likely due to dehydration, less likely cardiac.     Assessment:  Cardiomyopathy, likely ischemic, history of ejection fraction 25 to 30% on maximum tolerated guideline directed medical therapy.  Stat echocardiogram completed today showed ejection fraction of 30% with severe global hypokinesia of the left ventricle, similar to previous, based on read of [facility] echocardiogram.  Due to profound hypotension, blood pressure medications adequately directed medical therapy was held while pressors initiated.   "PTA regimen included carvedilol 6.25 mg twice daily, Entresto 49-51 mg twice daily and torsemide 100 mg daily  History of Coronary artery disease, inferior   STEMI in 2017 status post PCI to the RCA   2021 status post PCI to the OM and left circumflex  Lexiscan study in October of this year with no ischemia but noted inferior lateral and apical infarcts.  Coronary Angiogram this admission with severe two-vessel coronary artery disease, proximal RCA 70% stenosed and mid circumflex to distal circumflex 80% stenosed.    Past Medical History:   Wean off dobutamine  Monitor blood pressure, as able can restart carvedilol and Entresto at half doses and titrate as needed to control BP  Diuretics and volume status per nephrology  In light of severe two-vessel disease, consider revascularization if symptoms are refractory to medical therapy.  Fortunately he has not had any chest pain or pressure with escalation of activity at his subacute rehab.  Patient would like to establish care at our cardiology clinic in [place].  Will help arrange.    20 minutes spent by me on the date of service doing chart review, history, exam, documentation, coordination and discussion with other care providers & further activities per the note. MD time separate.   Tamy Guthrie PA-C  [facility]  Pager: through Vocera    Interval History   Patient doing quite well.  Able to lay flat without orthopnea or PND.  No blood pressure drops.  No dizziness or lightheadedness.  No palpitations or racing heart.  No chest pain or pressure.      Data   Last 24 hours diagnostics reviewed:  Vital signs:  Temp: 98  F (36.7  C) Temp src: Oral BP: 134/88 Pulse: 75   Resp: 18 SpO2: 99 % O2 Device: None (Room air) Oxygen Delivery: 2 LPM   Weight: 79 kg (174 lb 2.6 oz)  Estimated body mass index is 23.85 kg/m  as calculated from the following:    Height as of 10/8/24: 1.82 m (5' 11.65\").    Weight as of this encounter: 79 kg (174 lb 2.6 oz).      Last Comprehensive " Metabolic Panel:  Lab Results   Component Value Date     (L) 11/29/2024    POTASSIUM 4.4 11/29/2024    CHLORIDE 96 (L) 11/29/2024    CO2 26 11/29/2024    ANIONGAP 12 11/29/2024    GLC 80 11/29/2024    BUN 38.2 (H) 11/29/2024    CR 7.66 (H) 11/29/2024    GFRESTIMATED 7 (L) 11/29/2024    TERENCE 8.4 (L) 11/29/2024       CBC RESULTS:   Recent Labs   Lab Test 11/28/24  0535   WBC 5.9   RBC 3.38*   HGB 10.5*   HCT 31.6*   MCV 94   MCH 31.1   MCHC 33.2   RDW 19.5*        Intake/Output Summary (Last 24 hours) at 11/29/2024 1202  Last data filed at 11/29/2024 1025  Gross per 24 hour   Intake 603 ml   Output 1550 ml   Net -947 ml     Cardiac imaging reviewed  November 27, 2024 echocardiogram:  Left ventricular systolic function is severely reduced.  Biplane LVEF is 30%.  There is severe global hypokinesia of the left ventricle.  The right ventricular systolic function is mildly reduced.  No hemodynamically significant valvular aortic stenosis.  The study was technically difficult. The study was technically limited. There  is no comparison study available.     Nuclear medicine Lexiscan stress October 22, 2024:    The nuclear stress test is abnormal. No evidence of ischemia. presence of visecral tracer artifact decreases specificity.    There is a medium sized area of infarction in the entire inferolateral segment(s) of the left ventricle extending into basal inferior segment.    There is a small area of nontransmural infarction in the apical segment(s) of the left ventricle.    TID is absent.    Left ventricular function is severely reduced.    The left ventricular ejection fraction at stress is 26%.    There is no prior study for comparison.     June 2024 echocardiogram at [facility]:  * The estimated ejection fraction is 20-25%.     * Left ventricular systolic function is severely decreased.     * There is mild to moderate concentric left ventricular hypertrophy.     * Detailed valvular analysis cannot be done due  to limited nature of   study.    * Prior study from 10/24/2023. EF 20-25% - no change.       Physical Exam   Temp: 98  F (36.7  C) Temp src: Oral BP: 134/88 Pulse: 75   Resp: 18 SpO2: 99 % O2 Device: None (Room air)    Vitals:    11/28/24 0603 11/28/24 1300 11/29/24 0628   Weight: 81 kg (178 lb 9.2 oz) 75.3 kg (166 lb) 79 kg (174 lb 2.6 oz)       GEN: awake, alert, laying flat   HEART: RRR no murmur  LUNGS: CTAB  EXT: no murmur    Medications   Current Facility-Administered Medications   Medication Dose Route Frequency Provider Last Rate Last Admin    Continuing statin from home medication list OR statin order already placed during this visit   Does not apply DOES NOT GO TO Maurizio Cristina MD        dianeal PD LOW calcium-1.5% dex (calcium 2.5 mEq/L) 6,000 mL PERITONEAL DIALYSATE   Dialysis [facility] Supplied PD Duncan Lomeli DO        dianeal PD LOW calcium-1.5% dex (calcium 2.5 mEq/L) 6,000 mL PERITONEAL DIALYSATE   Dialysis [facility] Supplied PD Duncan Lomeli DO        dianeal PD LOW calcium-1.5% dex (calcium 2.5 mEq/L) 6,000 mL PERITONEAL DIALYSATE   Dialysis [facility] Supplied PD Woodrow Serna MD   New Bag at 11/27/24 2229    dianeal PD LOW calcium-2.5% dex (calcium 2.5 mEq/L) 6,000 mL PERITONEAL DIALYSATE   Dialysis [facility] Supplied PD Duncan Lomeli DO        DOBUTamine (DOBUTREX) 500 mg in D5W 250 mL infusion (adult std conc)  5 mcg/kg/min Intravenous Continuous Dima Romo MD   Stopped at 11/29/24 0853    medication instruction   Does not apply Continuous Maurizio Dyer MD        Patient is already receiving anticoagulation with heparin, enoxaparin (LOVENOX), warfarin (COUMADIN)  or other anticoagulant medication   Does not apply Continuous Maurizio Dyer MD        Reason ACE/ARB/ARNI order not selected   Other DOES NOT GO TO Maurizio Cristina MD        reason aspirin not prescribed (intentional)   Other DOES NOT GO TO Maurizio Cristina MD        Reason beta blocker not prescribed    Does not apply DOES NOT GO TO Maurizio Cristina MD         Current Facility-Administered Medications   Medication Dose Route Frequency Provider Last Rate Last Admin    allopurinol (ZYLOPRIM) tablet 200 mg  200 mg Oral QPM Maurizio Glover MD   200 mg at 11/28/24 2233    atorvastatin (LIPITOR) tablet 40 mg  40 mg Oral At Bedtime Maurizio Glover MD   40 mg at 11/28/24 2232    calcitRIOL (ROCALTROL) capsule 0.25 mcg  0.25 mcg Oral At Bedtime Maurizio Glover MD   0.25 mcg at 11/28/24 2232    cinacalcet (SENSIPAR) tablet 60 mg  60 mg Oral Once per day on Monday Wednesday Friday Maurizio Glover MD   60 mg at 11/27/24 2122    clopidogrel (PLAVIX) tablet 75 mg  75 mg Oral QPM Maurizio Glover MD   75 mg at 11/28/24 2232    insulin glargine (LANTUS PEN) injection 20 Units  20 Units Subcutaneous At Bedtime Maurizio Glover MD   20 Units at 11/28/24 2328    pantoprazole (PROTONIX) EC tablet 40 mg  40 mg Oral QAM AC Maurizio Glover MD   40 mg at 11/28/24 0914    sevelamer carbonate (RENVELA) tablet 800 mg  800 mg Oral TID w/meals Maurizio Glover MD   800 mg at 11/28/24 1806    sodium chloride (PF) 0.9% PF flush 3 mL  3 mL Intracatheter Q8H Tamy Guthrie PA-C   3 mL at 11/28/24 1807    warfarin ANTICOAGULANT (COUMADIN) tablet 6 mg  6 mg Oral ONCE at 18:00 Maurizio Glover MD        Warfarin Dose Required Daily - Pharmacist Managed  1 each Oral See Admin Instructions Dima Romo MD

## 2024-11-29 NOTE — CONSULTS
Care Management Initial Consult    General Information  Assessment completed with: Patient,    Type of CM/SW Visit: Initial Assessment    Primary Care Provider verified and updated as needed: Yes   Readmission within the last 30 days: current reason for admission unrelated to previous admission      Reason for Consult: discharge planning  Advance Care Planning: Advance Care Planning Reviewed: no concerns identified          Communication Assessment  Patient's communication style: spoken language (English or Bilingual)    Hearing Difficulty or Deaf: no        Cognitive  Cognitive/Neuro/Behavioral: WDL  Level of Consciousness: alert  Arousal Level: opens eyes spontaneously  Orientation: oriented x 4  Mood/Behavior: calm, cooperative  Best Language: 0 - No aphasia  Speech: clear    Living Environment:   People in home: alone     Current living Arrangements: apartment      Able to return to prior arrangements: yes       Family/Social Support:  Care provided by: self  Provides care for: no one  Marital Status:   Support system: Children          Description of Support System: Supportive, Involved    Support Assessment: Adequate family and caregiver support    Current Resources:   Patient receiving home care services: No        Community Resources: Dialysis Services, DME  Equipment currently used at home: cane, straight  Supplies currently used at home: Diabetic Supplies, Other    Employment/Financial:  Employment Status: disabled        Financial Concerns: none   Referral to Financial Worker: No       Does the patient's insurance plan have a 3 day qualifying hospital stay waiver?  No    Lifestyle & Psychosocial Needs:  Social Drivers of Health     Food Insecurity: Low Risk  (10/15/2024)    Food Insecurity     Within the past 12 months, did you worry that your food would run out before you got money to buy more?: No     Within the past 12 months, did the food you bought just not last and you didn t have money to  get more?: No   Depression: Not at risk (8/12/2024)    PHQ-2     PHQ-2 Score: 0   Housing Stability: Low Risk  (10/15/2024)    Housing Stability     Do you have housing? : Yes     Are you worried about losing your housing?: No   Tobacco Use: Medium Risk (11/27/2024)    Patient History     Smoking Tobacco Use: Former     Smokeless Tobacco Use: Never     Passive Exposure: Not on file   Financial Resource Strain: Low Risk  (10/15/2024)    Financial Resource Strain     Within the past 12 months, have you or your family members you live with been unable to get utilities (heat, electricity) when it was really needed?: No   Alcohol Use: Unknown (11/28/2018)    Received [facility], [facility]    AUDIT-C     Frequency of Alcohol Consumption: Monthly or less     Average Number of Drinks: Not on file     Frequency of Binge Drinking: Never   Transportation Needs: Low Risk  (10/15/2024)    Transportation Needs     Within the past 12 months, has lack of transportation kept you from medical appointments, getting your medicines, non-medical meetings or appointments, work, or from getting things that you need?: No   Physical Activity: Unknown (10/8/2024)    Exercise Vital Sign     Days of Exercise per Week: 0 days     Minutes of Exercise per Session: Not on file   Interpersonal Safety: Low Risk  (11/28/2024)    Interpersonal Safety     Do you feel physically and emotionally safe where you currently live?: Yes     Within the past 12 months, have you been hit, slapped, kicked or otherwise physically hurt by someone?: No     Within the past 12 months, have you been humiliated or emotionally abused in other ways by your partner or ex-partner?: No   Stress: No Stress Concern Present (10/8/2024)    [employer] - Occupational Stress Questionnaire     Feeling of Stress : Only a little   Social Connections: Unknown (10/8/2024)    Social  "Connection and Isolation Panel [NHANES]     Frequency of Communication with Friends and Family: Not on file     Frequency of Social Gatherings with Friends and Family: Patient declined     Attends Orthodoxy Services: Not on file     Active Member of Clubs or Organizations: Not on file     Attends Club or Organization Meetings: Not on file     Marital Status: Not on file   Health Literacy: Not on file       Functional Status:  Prior to admission patient needed assistance:   Dependent ADLs:: Ambulation-cane  Dependent IADLs:: Independent  Assesssment of Functional Status: Not at  functional baseline    Mental Health Status:          Chemical Dependency Status:                Values/Beliefs:  Spiritual, Cultural Beliefs, Orthodoxy Practices, Values that affect care: no               Discussed  Partnership in Safe Discharge Planning  document with patient/family: No    Additional Information:  Per care management consult, chart was reviewed. H&P states pt is a \" 64 year old male with history of coronary artery disease with multiple stents, hypertension, hyperlipidemia, chronic paroxysmal A-fib  (s/p ablation), ESRD on PD, secondary hyperparathyroidism, RCC  (s/p nephrectomy), DM type II, gout, GERD, TALI who was recently admitted to the hospital from 10/15 - 11/9 for critical limb ischemia of the left lower extremity and ischemic left great toe wound (see details of surgery below ), now is being admitted on 11/27/2024 with generalized weakness.\"    GEENA met with pt at bedside and introduced self and role. Pt confirms he lives in his own apartment in [place]. Pt is on dialysis and uses a cane and glucometer at baseline. Pt also has a Dexcom 7 CGM and CPAP. Pt uses a 6L dialysis bag. Pt uses [facility] for dialysis.     Pt states he [facility] to the hospital [facility]. Pt would like to go back to finish his therapy. GEENA sent a referral to [facility]. GEENA called [facility] ([phone]) and " confirmed pt can return when ready. They requested to be contacted when pt is going to return.    Haydee from [facility] messaged CM and said pt has an 18 day bed hold with [facility].   N2N #[phone]     Next Steps: Medically ready, inform [place], transport, orders.     Yazmin Cruz [facility]  Inpatient Care Management

## 2024-11-29 NOTE — CONSULTS
[facility]- C.O.R.E. Clinic    Received CORE Clinic Consult from Tamy Guthrie      Reviewed Arnulfo's chart and note they are admitted for hypotension. Echocardiogram on 11/27 shows LVEF 30%.  This is their 2nd admission in the past year for concerns of heart failure.    Given above information, Arnulfo meets criteria for CORE Clinic as patients EF is =/<40%.     Met with Arnulfo at bedside to discuss CORE Clinic consult request.  Patient/family confirm they plan to enroll in CORE Clinic.    Patient's primary insurance:  [employer] Medicare Supplement     Pt reports the following HF symptoms prior to admission:  Weakness, tired, and lower extremity edema    Living situation: alone in an apartment    Med set up: Arnulfo takes care of his medications    Scale available at home:  yes, he already weights himself daily for peritoneal dialysis.    Barriers to HF follow-up:  transportation    Remote Monitoring:  consider HRS enrollment at upcoming CORE office visit    Medication review: He was on Entresto but blood pressure were not tolerating upon arrival to the hospital.    CM/HF education topics we reviewed:  Low sodium  Daily weights  Symptoms of HF to be reported to CORE Team.      Education materials provided:    Low sodium food and drink handout  Low sodium food product examples  Already prepared low salt meal delivery services  HF stoplight tool  Guide to HF booklet      I have arranged CORE appointments, see below.  I have contacted TCU to arrange transportation to initial cardiology follow-up    Future Appointments   Date Time Provider Department Center   11/29/2024  2:30 PM Dominique White, PT [facility]   12/5/2024 10:00 AM Kylie Boateng PA-C [facility]     Instructed Arnulfo to call [facility] CORE Team at [phone] with questions/concerns prior to this visit. Specifically instructed them to call RN with weight gain greater than 2 lbs overnight, and/or 5 lbs in a week, and/or worsening  SOB/edema/abdominal bloating.     Arnulfo verbalizes understanding of teaching and all questions were addressed.    Please call with any further questions.    Dori Benson RN, [facility] [place]  C.O.R.E. Clinic Care Coordinator  [phone]

## 2024-11-29 NOTE — PROGRESS NOTES
Vascular Surgery Progress Note  11/29/2024       Subjective:  - NAEON. Patient lying comfortably in bed this morning and conversing. Denies current CP or SOB. Currently on dobutamine.      Objective:  Temp:  [97.6  F (36.4  C)-99.6  F (37.6  C)] 98  F (36.7  C)  Pulse:  [] 95  Resp:  [16-18] 18  BP: (100-142)/(72-95) 142/90  SpO2:  [95 %-99 %] 99 %      Intake/Output Summary (Last 24 hours) at 11/29/2024 0832  Last data filed at 11/29/2024 0817  Gross per 24 hour   Intake 806 ml   Output 1250 ml   Net -444 ml         Physical Exam:  Gen: Awake, alert, NAD  Resp: Breathing room air  Incisions: Groin - appears healthy without signs of infection but some superficial dehiscence. Aquacel put in place and covered   Medial left leg - every other staple removed, no drainage  Vascular: monophasic DP/PT bilaterally     Labs:  Recent Labs   Lab 11/28/24  0535 11/27/24  1035   WBC 5.9 7.7   HGB 10.5* 11.4*    395       Recent Labs   Lab 11/29/24  0202 11/28/24  2222 11/28/24  0535 11/27/24  1949 11/27/24  1035   NA  --   --  130*  --  129*   POTASSIUM  --   --  4.1  --  4.9   CHLORIDE  --   --  94*  --  89*   CO2  --   --  24  --  26   BUN  --   --  40.5*  --  41.0*   CR  --   --  7.23*  --  7.58*   * 144* 151*   < > 150*   TERENCE  --   --  8.6*  --  9.2   MAG  --   --   --   --  1.8    < > = values in this interval not displayed.       Imaging:  US LE Arterial Duplex Bilateral & VLADIMIR Doppler no exercise   RIGHT LOWER EXTREMITY ARTERIAL ASSESSMENT (Velocity, AP dimension, Waveform):     Common femoral artery: 75 cm/s, 5.9 mm, triphasic  Profunda femoris artery: 45 cm/s, 4.3 mm, biphasic  SFA (proximal): 90 cm/s, 5.8 mm, triphasic  SFA (mid): 53 cm/s, 7.3 mm, triphasic  SFA (distal): 33 cm/s, 6.0 mm, monophasic  Popliteal artery (proximal): 41 cm/s, 4.3 mm, biphasic  Popliteal artery (distal): 97 cm/s, 2.3 mm, biphasic  Posterior tibial artery: 32 cm/s, 2.2 mm, monophasic  Anterior tibial artery: 29 cm/s, 2.0 mm,  monophasic  Peroneal artery: 56 cm/s, 2.8 mm, monophasic  Dorsalis pedis artery: 65 cm/s, 1.5 mm, monophasic     LEFT LOWER EXTREMITY ARTERIAL ASSESSMENT (Velocity, AP dimension, Waveform)::     Common femoral artery: 48 cm/s, 8.3 mm, triphasic  Profunda femoris artery: 51 cm/s, 4.9 mm, biphasic  SFA (proximal): 26 cm/s, 6.0 mm, biphasic  SFA (mid): 20 cm/s, 5.9 mm, monophasic  SFA (distal): occluded / not measured  Popliteal artery: occluded / not measured  Posterior tibial artery: 69 cm/s, 2.0 mm, monophasic  Anterior tibial artery: 69 cm/s, 2.4 mm, monophasic  Peroneal artery: 47 cm/s, 1.5 mm, monophasic  Dorsalis pedis artery: 86 cm/s, 1.9 mm, monophasic     LEFT FEM-TIBIAL/PERONEAL TRUNK W/ GSV ASSESSMENT (Velocity, AP dimension):     Inflow: 20 cm/s, 5.9 mm  Proximal Stent edge/Anastomosis: 50 cm/s, 2.9 mm  Stent (proximal): 60 cm/s, 4.3 mm  Stent (mid): 28 cm/s, 3.6 mm  Stent (distal): 63 cm/s, 2.2 mm  Distal Stent edge/Anastomosis: 36 cm/s, 2.0 mm     Resting ABIs are 0.30 on the right.     Resting ABIs are  0.46 on the left.                                                                      IMPRESSION:  1.  Examination limited by heavy atherosclerotic arterial vascular calcifications as well as by groin bandages and skin staples throughout the leg from recent bypass procedure. Prior left fem-tibial/peroneal trunk bypass with greater saphenous vein, patent. Limited visualization of the distal stent / anastomosis region.  2.  Monophasic waveforms of the left posterior tibial artery, anterior tibial artery, peroneal artery, and dorsalis pedis artery.  3.  Monophasic waveforms of the right distal superficial femoral artery as well as runoff vessels below the right popliteal artery.  4.  Velocity and AP dimension measurements as per above.  5.  Resting ABIs: 0.30 on the right, 0.46 on the left.      Assessment/Plan:   64 year old male s/p left femoral endarterectomy and fem-TPT bypass 10/25/27, s/p bypass  revision with ligation of side branches 10/27/24. Admitted from clinic for symptomatic hypotension on 11/27/24. S/p heart cath 11/27 demonstrating MVD.     - Continue Q4 doppler checks to BLE.  - Local wound care, dressing changes to L groin/leg incisions and toe wound. Every other staple removed to medial left leg.       Discussed with vascular surgery staff, who is in agreement with the above.  - - - - - - - - - - - - - - - - - -  Evelia Appiah PA-C  Vascular Surgery

## 2024-11-29 NOTE — PROGRESS NOTES
Renal Medicine Progress Note            Assessment/Plan:     Arnulfo Pritchett is a 64 year old male CAD, HTN, HLD, pafib, HFrEF (EF 20-25%), ESRD on PD, DM2, TALI, RCC s/p R nephrectomy (2022), RAD with multiple LE procedures who was admitted on 11/27/2024 with severe hypotension and generalized weakness.      Assessment:  1) hypotension: Appears all hypovolemic nature.  Patient does not adjust his PD solution has been doing although 2.5% solutions without adjustments.  On his own he was decreasing carvedilol dosing and frequency but appears worsened hypotension recently.  Discussed that he needs to adjust the dialysate and associated ultrafiltration when he is having lower blood pressures.     BP is back to his baseline.  Orthostatic symptoms have resolved.     2) end stage renal disease on PD  Chronic PD for last 3.5 years.  Home unit [facility], nephrologist Dr. May  Rx: 5 cycles, 2 L fill volumes, 9 hours, last fill 1.2 L with external.  Target weight reported at 79 kg.  Has had significant weight loss over the last number of months.     Anemia in goal range, 10.5 yesterday  Noted mild hyponatremia 130 yesterday     Other:  A-fib on warfarin  Known HFrEF, status post coronary angiogram with no interventions  PAD, status post left lower extremity revascularization 10/25/2024     Plan/Recs:  1) PD orders placed for tonight, plan for mixture 1.5 and 2.5% dextrose solutions with goal of no fluid removal.    Duncan Lomeli DO  [facility] consultants  Office: [phone]  Cell: [phone]        Interval History:     Patient when seen this morning now off dobutamine.  Blood pressure remains in his normal range around 120 systolic this morning.  Had issues with his machine, low drain volume on initial connection.  Araan had to be called for this.  -547 ultrafiltration overnight with all 1.5% dextrose solutions.-Stitches were removed by vascular surgeons morning.  He reports feeling his normal self.   Yesterday without orthostatic symptoms.          Medications and Allergies:     Current Facility-Administered Medications   Medication Dose Route Frequency Provider Last Rate Last Admin    allopurinol (ZYLOPRIM) tablet 200 mg  200 mg Oral QPM Maurizio Glover MD   200 mg at 11/28/24 2233    atorvastatin (LIPITOR) tablet 40 mg  40 mg Oral At Bedtime Maurizio Glover MD   40 mg at 11/28/24 2232    calcitRIOL (ROCALTROL) capsule 0.25 mcg  0.25 mcg Oral At Bedtime Maurizio Glover MD   0.25 mcg at 11/28/24 2232    cinacalcet (SENSIPAR) tablet 60 mg  60 mg Oral Once per day on Monday Wednesday Friday Maurizio Glover MD   60 mg at 11/27/24 2122    clopidogrel (PLAVIX) tablet 75 mg  75 mg Oral QPM Maurizio Glover MD   75 mg at 11/28/24 2232    insulin glargine (LANTUS PEN) injection 20 Units  20 Units Subcutaneous At Bedtime Maurizio Glover MD   20 Units at 11/28/24 2328    pantoprazole (PROTONIX) EC tablet 40 mg  40 mg Oral QAM AC Maurizio Glover MD   40 mg at 11/28/24 0914    sevelamer carbonate (RENVELA) tablet 800 mg  800 mg Oral TID w/meals Maurizio Glover MD   800 mg at 11/28/24 1806    sodium chloride (PF) 0.9% PF flush 3 mL  3 mL Intracatheter Q8H Tamy Guthrie PA-C   3 mL at 11/28/24 1807    warfarin ANTICOAGULANT (COUMADIN) tablet 6 mg  6 mg Oral ONCE at 18:00 Maurizio Glover MD        Warfarin Dose Required Daily - Pharmacist Managed  1 each Oral See Admin Instructions Dima Romo MD          No Known Allergies         Physical Exam:   Vitals were reviewed  /88   Pulse 92   Temp 98  F (36.7  C) (Oral)   Resp 18   Wt 79 kg (174 lb 2.6 oz)   SpO2 99%   BMI 23.85 kg/m      Wt Readings from Last 3 Encounters:   11/29/24 79 kg (174 lb 2.6 oz)   11/26/24 76.2 kg (168 lb)   11/09/24 76.5 kg (168 lb 10.4 oz)       Intake/Output Summary (Last 24 hours) at 11/29/2024 1017  Last data filed at 11/29/2024 0817  Gross per 24 hour   Intake 403 ml   Output 1150 ml   Net -747 ml       GENERAL: NAD   HEENT:   "Normocephalic  CV: irregularly irregular  RESP: Clear anteriorly  GI: soft, nontender  MUSCULOSKELETAL: No edema  NEURO:   Alert, oriented, normal speech  PSYCH: mood good, affect appropriate              Data:     BMP  Recent Labs   Lab 11/29/24  0828 11/29/24  0202 11/28/24  2222 11/28/24  0535 11/27/24  1949 11/27/24  1035   NA  --   --   --  130*  --  129*   POTASSIUM  --   --   --  4.1  --  4.9   CHLORIDE  --   --   --  94*  --  89*   TERENCE  --   --   --  8.6*  --  9.2   CO2  --   --   --  24  --  26   BUN  --   --   --  40.5*  --  41.0*   CR  --   --   --  7.23*  --  7.58*   * 101* 144* 151*   < > 150*    < > = values in this interval not displayed.     CBC  Recent Labs   Lab 11/28/24  0535 11/27/24  1035   WBC 5.9 7.7   HGB 10.5* 11.4*   HCT 31.6* 35.6*   MCV 94 100    395     No results found for: \"AST\", \"ALT\", \"GGT\", \"ALKPHOS\", \"BILITOTAL\", \"BILICONJ\", \"BILIDIRECT\", \"SHELTON\"  Lab Results   Component Value Date    INR 1.75 (H) 11/29/2024       Attestation:  I have reviewed today's vital signs, notes, medications, labs and imaging.    DO [facility] - [facility]phrology  Office: [phone]  Cell: [phone]    "

## 2024-11-30 LAB
ANION GAP SERPL CALCULATED.3IONS-SCNC: 10 MMOL/L (ref 7–15)
BUN SERPL-MCNC: 37 MG/DL (ref 8–23)
CALCIUM SERPL-MCNC: 8.3 MG/DL (ref 8.8–10.4)
CHLORIDE SERPL-SCNC: 96 MMOL/L (ref 98–107)
CREAT SERPL-MCNC: 7.48 MG/DL (ref 0.67–1.17)
EGFRCR SERPLBLD CKD-EPI 2021: 7 ML/MIN/1.73M2
ERYTHROCYTE [DISTWIDTH] IN BLOOD BY AUTOMATED COUNT: 19 % (ref 10–15)
GLUCOSE BLDC GLUCOMTR-MCNC: 125 MG/DL (ref 70–99)
GLUCOSE BLDC GLUCOMTR-MCNC: 145 MG/DL (ref 70–99)
GLUCOSE BLDC GLUCOMTR-MCNC: 150 MG/DL (ref 70–99)
GLUCOSE BLDC GLUCOMTR-MCNC: 178 MG/DL (ref 70–99)
GLUCOSE BLDC GLUCOMTR-MCNC: 98 MG/DL (ref 70–99)
GLUCOSE SERPL-MCNC: 162 MG/DL (ref 70–99)
HCO3 SERPL-SCNC: 25 MMOL/L (ref 22–29)
HCT VFR BLD AUTO: 31.6 % (ref 40–53)
HGB BLD-MCNC: 10.2 G/DL (ref 13.3–17.7)
INR PPP: 1.64 (ref 0.85–1.15)
MCH RBC QN AUTO: 30.5 PG (ref 26.5–33)
MCHC RBC AUTO-ENTMCNC: 32.3 G/DL (ref 31.5–36.5)
MCV RBC AUTO: 95 FL (ref 78–100)
PLATELET # BLD AUTO: 250 10E3/UL (ref 150–450)
POTASSIUM SERPL-SCNC: 3.9 MMOL/L (ref 3.4–5.3)
RBC # BLD AUTO: 3.34 10E6/UL (ref 4.4–5.9)
SODIUM SERPL-SCNC: 131 MMOL/L (ref 135–145)
WBC # BLD AUTO: 5.9 10E3/UL (ref 4–11)

## 2024-11-30 PROCEDURE — 99232 SBSQ HOSP IP/OBS MODERATE 35: CPT | Performed by: INTERNAL MEDICINE

## 2024-11-30 PROCEDURE — 90945 DIALYSIS ONE EVALUATION: CPT | Performed by: INTERNAL MEDICINE

## 2024-11-30 PROCEDURE — 36415 COLL VENOUS BLD VENIPUNCTURE: CPT | Performed by: INTERNAL MEDICINE

## 2024-11-30 PROCEDURE — 210N000002 HC R&B HEART CARE

## 2024-11-30 PROCEDURE — 85610 PROTHROMBIN TIME: CPT | Performed by: INTERNAL MEDICINE

## 2024-11-30 PROCEDURE — 80048 BASIC METABOLIC PNL TOTAL CA: CPT | Performed by: INTERNAL MEDICINE

## 2024-11-30 PROCEDURE — 85018 HEMOGLOBIN: CPT | Performed by: INTERNAL MEDICINE

## 2024-11-30 PROCEDURE — 250N000013 HC RX MED GY IP 250 OP 250 PS 637: Performed by: INTERNAL MEDICINE

## 2024-11-30 PROCEDURE — 82565 ASSAY OF CREATININE: CPT | Performed by: INTERNAL MEDICINE

## 2024-11-30 PROCEDURE — 82310 ASSAY OF CALCIUM: CPT | Performed by: INTERNAL MEDICINE

## 2024-11-30 PROCEDURE — 85041 AUTOMATED RBC COUNT: CPT | Performed by: INTERNAL MEDICINE

## 2024-11-30 RX ORDER — WARFARIN SODIUM 3 MG/1
6 TABLET ORAL
Status: COMPLETED | OUTPATIENT
Start: 2024-11-30 | End: 2024-11-30

## 2024-11-30 RX ORDER — GENTAMICIN SULFATE 1 MG/G
CREAM TOPICAL DAILY PRN
Status: DISCONTINUED | OUTPATIENT
Start: 2024-11-30 | End: 2024-12-02

## 2024-11-30 RX ADMIN — SEVELAMER CARBONATE 800 MG: 800 TABLET, FILM COATED ORAL at 10:25

## 2024-11-30 RX ADMIN — CALCITRIOL CAPSULES 0.25 MCG 0.25 MCG: 0.25 CAPSULE ORAL at 20:24

## 2024-11-30 RX ADMIN — ATORVASTATIN CALCIUM 40 MG: 40 TABLET, FILM COATED ORAL at 20:24

## 2024-11-30 RX ADMIN — ACETAMINOPHEN 650 MG: 325 TABLET, FILM COATED ORAL at 23:06

## 2024-11-30 RX ADMIN — CLOPIDOGREL BISULFATE 75 MG: 75 TABLET ORAL at 20:24

## 2024-11-30 RX ADMIN — INSULIN GLARGINE 20 UNITS: 100 INJECTION, SOLUTION SUBCUTANEOUS at 23:05

## 2024-11-30 RX ADMIN — PANTOPRAZOLE SODIUM 40 MG: 40 TABLET, DELAYED RELEASE ORAL at 08:41

## 2024-11-30 RX ADMIN — SEVELAMER CARBONATE 800 MG: 800 TABLET, FILM COATED ORAL at 18:03

## 2024-11-30 RX ADMIN — ALLOPURINOL 200 MG: 100 TABLET ORAL at 20:24

## 2024-11-30 RX ADMIN — SEVELAMER CARBONATE 800 MG: 800 TABLET, FILM COATED ORAL at 13:44

## 2024-11-30 RX ADMIN — WARFARIN SODIUM 6 MG: 3 TABLET ORAL at 18:03

## 2024-11-30 ASSESSMENT — ACTIVITIES OF DAILY LIVING (ADL)
ADLS_ACUITY_SCORE: 42
ADLS_ACUITY_SCORE: 40
ADLS_ACUITY_SCORE: 42
ADLS_ACUITY_SCORE: 42
ADLS_ACUITY_SCORE: 40
ADLS_ACUITY_SCORE: 42
ADLS_ACUITY_SCORE: 40
ADLS_ACUITY_SCORE: 40
ADLS_ACUITY_SCORE: 42
ADLS_ACUITY_SCORE: 42
ADLS_ACUITY_SCORE: 40
ADLS_ACUITY_SCORE: 42
ADLS_ACUITY_SCORE: 42
ADLS_ACUITY_SCORE: 40
ADLS_ACUITY_SCORE: 40
ADLS_ACUITY_SCORE: 42
ADLS_ACUITY_SCORE: 40

## 2024-11-30 NOTE — PLAN OF CARE
Goal Outcome Evaluation:      Plan of Care Reviewed With: patient    Overall Patient Progress: no changeOverall Patient Progress: no change     Heart Failure Care Map  GOALS TO BE MET BEFORE DISCHARGE:    1. Decrease congestion and/or edema with diuretic therapy to achieve near optimal volume status.     Dyspnea improved: Yes, satisfactory for discharge.   Edema improved: Yes, satisfactory for discharge.        Last 24 hour I/O:   Intake/Output Summary (Last 24 hours) at 11/30/2024 1402  Last data filed at 11/30/2024 1300  Gross per 24 hour   Intake 1420 ml   Output 400 ml   Net 1020 ml           Net I/O and Weights since admission:   10/31 1500 - 11/30 1459  In: 4046 [P.O.:2420; I.V.:6]  Out: 2400 [Urine:2000]  Net: 1646     Vitals:    11/28/24 0603 11/28/24 1300 11/29/24 0628 11/30/24 0608   Weight: 81 kg (178 lb 9.2 oz) 75.3 kg (166 lb) 79 kg (174 lb 2.6 oz) 78.2 kg (172 lb 6.4 oz)       2.  O2 sats > 90% on room air, or at prior home O2 therapy level.      Able to wean O2 this shift to keep sats above 90%?: Yes, satisfactory for discharge.   Does patient use Home O2? No          Current oxygenation status:   SpO2: 95 %     O2 Device: None (Room air),      3.  Tolerates ambulation and mobility near baseline.     Ambulation: Yes, satisfactory for discharge.   Times patient ambulated with staff this shift: 2    Please review the Heart Failure Care Map for additional HF goal outcomes.    Josephine Mcgill RN  11/30/2024   Neuro:intact  CV/Rhythm:SR/ST with movement, orthostatic, hold off on discharge today  Resp/02:RA, MORTENSEN  GI/Diet:renal diet  :voiding, PD set up for tonight  Skin/Incisions/Sites:L groin CDI, L great toe redressed, R heel pink to right outer side- mepliex on heels bilaterally  Pulses/CMS:doppler pedals, post tib  Edema:none  Activity/Falls Risk:with much encouragement, up to chair for both meals, fall risk  Lines/Drains/IVs:PIV  Labs/BGM:Cr 7.48  Test/Procedures:PD tonight set up done by HD  RN  VS/Pain:orthostatic with dizziness, pain to BLE with movement/ambulation  DC Plan:? Tomorrow return to TCU  Other:pt updated, WOC following

## 2024-11-30 NOTE — PLAN OF CARE
Goal Outcome Evaluation:    Neuro: AOx4  Cardiac: Sinus Rhythm, HR: 98, BP: 130/98, denies chest pain  Resp: 94% on room air, clear B/L  GI/: continent, abdomen soft, non-distended, receives peritoneal dialysis overnight and troubleshot once, on a renal diet  Skin: Pt has L groin, no new drainage; LLE vascular sites open to air and unchanged, vascular following. Pt declined assessment of heels. Mepilex in place  Mobility: 1x assist, GB, walker    Plan: continue monitoring BP, WOC consult, Vascular following

## 2024-11-30 NOTE — PROGRESS NOTES
Cycler set up per protocol and per treatment orders     Results from previous treatment:  Effluent color and clarity: yellow, clear, no fibers  Total UF: 160ml  Initial Drain: 334  Avg Dwell: 1:02  Lost Dwell: 1:30    Cycler Serial Number: [id number]  Total Treatment Volume: [id number]  Total treatment time: 9 hrs  Fill Volume: 2000ml  Last Fill Volume:0ml  Heater ba.5% 6000mL;  Lot #: [id number];  Expiration Date:  (X2 bag)     Number of cycles including final fill: 5  Dwell Time: 1:20 minutes  Last Fill Volume: 0ml  Initial Drain Alarm: 0ml  PD orders reviewed with Patient procedure and ESRD teaching done and questions answered.  Cycler ready for hook-up.    Report given to: Josephine Mcgill RN [facility] consent form signed yes

## 2024-11-30 NOTE — PLAN OF CARE
VSS, dobutamine has been off since early today. No complaints. Up with 1, gb/walker. Peritoneal dialysis had frequent error alarms last night, dialysis nurse has reset and prepped the machine for this evening. Eating/drinking this afternoon without an issue, did vomit after breakfast. Voiding in small amounts. + BM today. Vascualr is following old incisions, every other staple was removed on leg incisions. Groin incision was dressed with aquacell. L great toe partial amputation is covered by bandaid-changed this morning. R heel appears to have a tissue injury, potential pressure wound. Skin is intact. Mepilex applied to both heels. Heels off bed at all times. Lateral side of L foot has a scab/blood blister and large callus. BP meds remain on hold. When medically stable pt plans to return to TCU that he came from.

## 2024-11-30 NOTE — PROGRESS NOTES
[facility]    Medicine Progress Note - Hospitalist Service    Date of Admission:  11/27/2024    Assessment & Plan   Arnulfo Pritchett is a 64 year old male with history of coronary artery disease with multiple stents, hypertension, hyperlipidemia, chronic paroxysmal A-fib  (s/p ablation), ESRD on PD, secondary hyperparathyroidism, RCC  (s/p nephrectomy), DM type II, gout, GERD, TALI who was recently admitted to the hospital from 10/15 - 11/9 for critical limb ischemia of the left lower extremity and ischemic left great toe wound (see details of surgery below ), now is being admitted on 11/27/2024 with generalized weakness.          # Hypotension on admission: Responded to 1 unit PRBC, 1 L normal saline  # Lactic acidosis  # Non-STEMI versus nonischemic myocardial injury due to significant hypotension (recent nuclear test with no ischemia ), admission troponin 564 and BNP of 18, 739   # History ofcoronary artery disease with multiple stents (last in 2021)  - * Echo 6/2024 showed LVEF 20-25%, segmental wall motion globally hypokinetic.   * Nuc stress test 10/22 was abnormal but no evidence of ischemia; medium sized area of infarction in the entire inferolateral segment(s) of the left ventricle extending into basal inferior segment; small area of nontransmural infarction in the apical segment(s) of the left ventricle; TID absent; left ventricular ejection fraction at stress 26%.     -Cardiology consulted.   -Initial concern for cardiogenic shock, followed by emergent diagnostic coronary angiogram which showed stable moderate to severe distal coronary artery disease.  No significant lesions in the left main or any proximal or ostial lesions.  -Patient has been started on IV dobutamine at 5 mg/h overnight titrated to 2.5 mg/h and has been discontinued on 11/29  -Continue to monitor blood pressure closely since patient is off IV dobutamine  -Consider to restart carvedilol and Entresto once patient  more stable.  -Diuretic management according to nephrology.  -Positive orthostatic blood pressure along with symptoms of dizziness noted on 11/30  -Encourage patient to increase oral intake, likely less aggressive fluid removal overnight with PD or hold off PD upon discussion with nephrology.  -Possible revascularization likely as outpatient if patient continues to have symptoms.     # Chronic A-fib:   Rate controlled: Hold PTA carvedilol  Anticoagulation: PTA on Coumadin.  Admission INR of 5.55.  -Patient was initially started on IV heparin which has been stopped per cardiology recommendations  -Warfarin management per pharmacy recommendations.     #recent admission from 10/15 - 11/9 with Critical limb ischemia of the left lower extremity.  # S/p left femoral endarterectomy with bovine pericardial patch angioplasty; left distal common femoral artery/proximal superficial femoral artery to tibioperoneal trunk bypass; and temporary closure of left groin wound with wound VAC 10/25/2024.  # S/p ligation of side branches of the left great saphenous vein and temporary closure of right groin with application of wound VAC 10/27/2024.  Ischemic left great toe wound - s/p amputation of left great toe 10/27/2024.  Left groin wound infection.  H/o acute RLE arterial occlusion s/p SFA popliteal balloon angioplasty and stenting (5/2024).  -Appreciate vascular surgery review in ER.  No concerns for active infection  -Continue cares per vascular surgery  -Every 4 Doppler checks to bilateral lower extremity.  -Dressing changes to the left groin and leg incisions along with toe wound and continue aspirin 81 mg, Plavix 75 mg daily     # ESKD secondary to diabetes mellitus nephropathy on PD  # Secondary hyperparathyroidism  -Nephrology consult  -Continue ongoing peritoneal dialysis.  Next episode tonight  -Continue diuretic management according to nephrology.     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 Indication: AMI (NSTEMI/ STEMI) (48 hours)  Code Status: Full Code      Clinically Significant Risk Factors         # Hyponatremia: Lowest Na = 131 mmol/L in last 2 days, will monitor as appropriate  # Hypochloremia: Lowest Cl = 96 mmol/L in last 2 days, will monitor as appropriate          # Hypertension: Noted on problem list                # Financial/Environmental Concerns: none         Social Drivers of Health    Tobacco Use: Medium Risk (11/27/2024)    Patient History     Smoking Tobacco Use: Former     Smokeless Tobacco Use: Never   Alcohol Use: Unknown (11/28/2018)    Received from [facility]    AUDIT-C     Frequency of Alcohol Consumption: Monthly or less     Frequency of Binge Drinking: Never   Physical Activity: Unknown (10/8/2024)    Exercise Vital Sign     Days of Exercise per Week: 0 days   Social Connections: Unknown (10/8/2024)    Social Connection and Isolation Panel [NHANES]     Frequency of Social Gatherings with Friends and Family: Patient declined          Disposition Plan     Medically Ready for Discharge: Anticipated Tomorrow             Dima Romo MD  Hospitalist Service  [facility]  Securely message with ExecNote (more info)  Text page via [employer] Paging/Directory   ______________________________________________________________________    Interval History   Patient was sleeping at the time of my visit, woke up to his name, offers no complaints of chest pain dizziness lightheadedness nausea or vomiting at the moment.  Discussed  with bedside RN to perform orthostatic vitals which he initially disagreed.  Eventually when attempted to do orthostatic vitals patient appeared to have symptoms and positive orthostatic vitals.    Physical Exam   Vital Signs: Temp: 97.5  F (36.4  C) Temp src: Oral BP: (!) 146/96 Pulse: 96   Resp: 16 SpO2: 96 % O2 Device: None (Room air)    Weight: 172 lbs 6.4 oz    Constitutional: Awake, alert, cooperative, no apparent distress  Respiratory: Clear to auscultation bilaterally, no crackles or wheezing  Cardiovascular: Regular rate and rhythm, normal S1 and S2, and no murmur noted  GI: Normal bowel sounds, soft, non-distended, non-tender  Skin/Integumen: No rashes, no cyanosis, no edema  Other: Alert oriented x 3, no apparent focal deficits.

## 2024-11-30 NOTE — PROGRESS NOTES
Renal Medicine Progress Note            Assessment/Plan:     Arnulfo Pritchett is a 64 year old male CAD, HTN, HLD, pafib, HFrEF (EF 20-25%), ESRD on PD, DM2, TALI, RCC s/p R nephrectomy (2022), RAD with multiple LE procedures who was admitted on 11/27/2024 with severe hypotension and generalized weakness.      Assessment:  1) hypotension: Appears all hypovolemic nature.  Patient does not adjust his PD solution has been doing although 2.5% solutions without adjustments.  On his own he was decreasing carvedilol dosing and frequency but appears worsened hypotension recently. Counseled on adjusting PD prescription based on weight/ volume status. Needs to be followed closely by PD team in outpt .      BP is back to his baseline.  Orthostatic symptoms have resolved.     2) end stage renal disease on PD  Chronic PD for last 3.5 years.  Home unit [facility], nephrologist Dr. May  Rx: 5 cycles, 2 L fill volumes, 9 hours, last fill 1.2 L with external.  Target weight reported at 79 kg.  Has had significant weight loss over the last number of months.     Anemia in goal range, 10.2  Noted mild hyponatremia 131     Other:  A-fib on warfarin  Known HFrEF, status post coronary angiogram with no interventions  PAD, status post left lower extremity revascularization 10/25/2024     Plan/Recs:  1) PD orders placed for tonight-all 1.5%.  Plan for minimal UF.    Okay for discharge from renal standpoint.  If discharged over the weekend, patient is concerned that his current TCU only has green, 2.5% PD bags.   He will call [facility] PD on-call on discharge and plan on getting 1.5% bags from his dialysis unit on Monday  In the meantime, if discharged today/tomorrow-> I have advised him to skip Extraneal during daytime to minimize UF and drink a bit more.  Monitor blood pressure at home.            Interval History:     Seen for PD management.   Initial alarm for low drain  improved after troubleshooting by Arana  BP better.    Denies any complaints.  UF of around 300 cc.           Medications and Allergies:     Current Facility-Administered Medications   Medication Dose Route Frequency Provider Last Rate Last Admin    allopurinol (ZYLOPRIM) tablet 200 mg  200 mg Oral QPM Maurizio Glover MD   200 mg at 11/29/24 2137    atorvastatin (LIPITOR) tablet 40 mg  40 mg Oral At Bedtime Maurizio Glover MD   40 mg at 11/29/24 2139    calcitRIOL (ROCALTROL) capsule 0.25 mcg  0.25 mcg Oral At Bedtime Maurizio Glover MD   0.25 mcg at 11/29/24 2222    cinacalcet (SENSIPAR) tablet 60 mg  60 mg Oral Once per day on Monday Wednesday Friday Maurizio Glover MD   60 mg at 11/29/24 1157    clopidogrel (PLAVIX) tablet 75 mg  75 mg Oral QPM Maurizio Glover MD   75 mg at 11/29/24 2137    insulin glargine (LANTUS PEN) injection 20 Units  20 Units Subcutaneous At Bedtime Maurizio Glover MD   20 Units at 11/29/24 2140    pantoprazole (PROTONIX) EC tablet 40 mg  40 mg Oral QAM AC Maurizio Glover MD   40 mg at 11/30/24 0841    sevelamer carbonate (RENVELA) tablet 800 mg  800 mg Oral TID w/meals Maurizio Glover MD   800 mg at 11/30/24 0841    sodium chloride (PF) 0.9% PF flush 3 mL  3 mL Intracatheter Q8H Tamy Guthrie PA-C   3 mL at 11/28/24 1807    warfarin ANTICOAGULANT (COUMADIN) tablet 6 mg  6 mg Oral ONCE at 18:00 Maurizio Glover MD        Warfarin Dose Required Daily - Pharmacist Managed  1 each Oral See Admin Instructions Dima Romo MD          No Known Allergies         Physical Exam:   Vitals were reviewed  BP (!) 137/91 (BP Location: Left arm)   Pulse 97   Temp 97.5  F (36.4  C) (Oral)   Resp 18   Wt 78.2 kg (172 lb 6.4 oz)   SpO2 95%   BMI 23.61 kg/m      Wt Readings from Last 3 Encounters:   11/30/24 78.2 kg (172 lb 6.4 oz)   11/26/24 76.2 kg (168 lb)   11/09/24 76.5 kg (168 lb 10.4 oz)       GENERAL: NAD   HEENT:  Normocephalic  CV: irregularly irregular  RESP: Clear anteriorly  GI: soft, nontender  MUSCULOSKELETAL: No edema  NEURO:    "Alert, oriented, normal speech  PSYCH: mood good, affect appropriate  Access-PD catheter in place              Data:     BMP  Recent Labs   Lab 11/30/24  0545 11/30/24  0242 11/29/24 2122 11/29/24  1723 11/29/24  1314 11/29/24  1126 11/28/24  2222 11/28/24  0535 11/27/24  1949 11/27/24  1035   *  --   --   --   --  134*  --  130*  --  129*   POTASSIUM 3.9  --   --   --   --  4.4  --  4.1  --  4.9   CHLORIDE 96*  --   --   --   --  96*  --  94*  --  89*   TERENCE 8.3*  --   --   --   --  8.4*  --  8.6*  --  9.2   CO2 25  --   --   --   --  26  --  24  --  26   BUN 37.0*  --   --   --   --  38.2*  --  40.5*  --  41.0*   CR 7.48*  --   --   --   --  7.66*  --  7.23*  --  7.58*   * 178* 120* 115*   < > 80   < > 151*   < > 150*    < > = values in this interval not displayed.     CBC  Recent Labs   Lab 11/30/24  0545 11/28/24  0535 11/27/24  1035   WBC 5.9 5.9 7.7   HGB 10.2* 10.5* 11.4*   HCT 31.6* 31.6* 35.6*   MCV 95 94 100    281 395     No results found for: \"AST\", \"ALT\", \"GGT\", \"ALKPHOS\", \"BILITOTAL\", \"BILICONJ\", \"BILIDIRECT\", \"SHELTON\"  Lab Results   Component Value Date    INR 1.64 (H) 11/30/2024       Attestation:  I have reviewed today's vital signs, notes, medications, labs and imaging.    Jennifer Jameson MD  [facility] - Nephrology  Office: [phone]      "

## 2024-12-01 LAB
GLUCOSE BLDC GLUCOMTR-MCNC: 112 MG/DL (ref 70–99)
GLUCOSE BLDC GLUCOMTR-MCNC: 130 MG/DL (ref 70–99)
GLUCOSE BLDC GLUCOMTR-MCNC: 144 MG/DL (ref 70–99)
GLUCOSE BLDC GLUCOMTR-MCNC: 160 MG/DL (ref 70–99)
GLUCOSE BLDC GLUCOMTR-MCNC: 96 MG/DL (ref 70–99)
INR PPP: 1.87 (ref 0.85–1.15)

## 2024-12-01 PROCEDURE — 36415 COLL VENOUS BLD VENIPUNCTURE: CPT | Performed by: INTERNAL MEDICINE

## 2024-12-01 PROCEDURE — 210N000002 HC R&B HEART CARE

## 2024-12-01 PROCEDURE — 99232 SBSQ HOSP IP/OBS MODERATE 35: CPT | Performed by: INTERNAL MEDICINE

## 2024-12-01 PROCEDURE — 258N000003 HC RX IP 258 OP 636: Performed by: INTERNAL MEDICINE

## 2024-12-01 PROCEDURE — 90945 DIALYSIS ONE EVALUATION: CPT | Performed by: INTERNAL MEDICINE

## 2024-12-01 PROCEDURE — 85610 PROTHROMBIN TIME: CPT | Performed by: INTERNAL MEDICINE

## 2024-12-01 PROCEDURE — 90945 DIALYSIS ONE EVALUATION: CPT

## 2024-12-01 PROCEDURE — 250N000013 HC RX MED GY IP 250 OP 250 PS 637: Performed by: INTERNAL MEDICINE

## 2024-12-01 RX ORDER — WARFARIN SODIUM 3 MG/1
6 TABLET ORAL
Status: COMPLETED | OUTPATIENT
Start: 2024-12-01 | End: 2024-12-01

## 2024-12-01 RX ORDER — MIDODRINE HYDROCHLORIDE 2.5 MG/1
2.5 TABLET ORAL
Status: DISCONTINUED | OUTPATIENT
Start: 2024-12-01 | End: 2024-12-03 | Stop reason: HOSPADM

## 2024-12-01 RX ORDER — MIDODRINE HYDROCHLORIDE 2.5 MG/1
2.5 TABLET ORAL 2 TIMES DAILY
Status: DISCONTINUED | OUTPATIENT
Start: 2024-12-02 | End: 2024-12-01

## 2024-12-01 RX ADMIN — ATORVASTATIN CALCIUM 40 MG: 40 TABLET, FILM COATED ORAL at 22:04

## 2024-12-01 RX ADMIN — INSULIN GLARGINE 20 UNITS: 100 INJECTION, SOLUTION SUBCUTANEOUS at 22:04

## 2024-12-01 RX ADMIN — CLOPIDOGREL BISULFATE 75 MG: 75 TABLET ORAL at 19:32

## 2024-12-01 RX ADMIN — SEVELAMER CARBONATE 800 MG: 800 TABLET, FILM COATED ORAL at 18:03

## 2024-12-01 RX ADMIN — SEVELAMER CARBONATE 800 MG: 800 TABLET, FILM COATED ORAL at 13:14

## 2024-12-01 RX ADMIN — PANTOPRAZOLE SODIUM 40 MG: 40 TABLET, DELAYED RELEASE ORAL at 09:46

## 2024-12-01 RX ADMIN — SODIUM CHLORIDE, POTASSIUM CHLORIDE, SODIUM LACTATE AND CALCIUM CHLORIDE 1000 ML: 600; 310; 30; 20 INJECTION, SOLUTION INTRAVENOUS at 11:52

## 2024-12-01 RX ADMIN — SEVELAMER CARBONATE 800 MG: 800 TABLET, FILM COATED ORAL at 09:46

## 2024-12-01 RX ADMIN — ALLOPURINOL 200 MG: 100 TABLET ORAL at 19:32

## 2024-12-01 RX ADMIN — WARFARIN SODIUM 6 MG: 3 TABLET ORAL at 18:03

## 2024-12-01 RX ADMIN — CALCITRIOL CAPSULES 0.25 MCG 0.25 MCG: 0.25 CAPSULE ORAL at 22:04

## 2024-12-01 ASSESSMENT — ACTIVITIES OF DAILY LIVING (ADL)
ADLS_ACUITY_SCORE: 42
ADLS_ACUITY_SCORE: 46
ADLS_ACUITY_SCORE: 42
ADLS_ACUITY_SCORE: 46
ADLS_ACUITY_SCORE: 42
ADLS_ACUITY_SCORE: 44
ADLS_ACUITY_SCORE: 42
ADLS_ACUITY_SCORE: 42
ADLS_ACUITY_SCORE: 44
ADLS_ACUITY_SCORE: 46
ADLS_ACUITY_SCORE: 42
ADLS_ACUITY_SCORE: 44

## 2024-12-01 NOTE — PROGRESS NOTES
Cycler set up per protocol and per treatment orders     Results from previous treatment:  Effluent color and clarity: yellow, clear  Total UF: -361ml  Initial Drain: 49ml  Avg Dwell: 56 minutes  Lost Dwell: 1:59    It seems last night's machine issues were due to initial drain alarm set at 1400ml. Initial drain alarm set to 0ml for tonight to reflect no last fill.     Cycler Serial Number: [id number]  Total Treatment Volume: [id number]  Total treatment time: 9 hrs  Fill Volume: 2000ml  Last Fill Volume:0ml  Heater ba.5% 6000mL;  Lot #: [id number];  Expiration Date: 2026  Side Bag #1: 1.5% 6000mL;  Lot #: [id number];  Expiration Date: 2026  Number of cycles including final fill: 5  Dwell Time: 1:20 minutes  Last Fill Volume: 0ml  Initial Drain Alarm: 0ml  PD orders reviewed with Patient procedure and ESRD teaching done and questions answered.  Cycler ready for hook-up.    Report given to: ARYAN Baez RN

## 2024-12-01 NOTE — PROGRESS NOTES
[facility]    Medicine Progress Note - Hospitalist Service    Date of Admission:  11/27/2024    Assessment & Plan   Arnulfo Pritchett is a 64 year old male with history of coronary artery disease with multiple stents, hypertension, hyperlipidemia, chronic paroxysmal A-fib  (s/p ablation), ESRD on PD, secondary hyperparathyroidism, RCC  (s/p nephrectomy), DM type II, gout, GERD, TALI who was recently admitted to the hospital from 10/15 - 11/9 for critical limb ischemia of the left lower extremity and ischemic left great toe wound (see details of surgery below ), now is being admitted on 11/27/2024 with generalized weakness.          # Hypotension on admission: Responded to 1 unit PRBC, 1 L normal saline  # Lactic acidosis  # Non-STEMI versus nonischemic myocardial injury due to significant hypotension (recent nuclear test with no ischemia ), admission troponin 564 and BNP of 18, 739   # History ofcoronary artery disease with multiple stents (last in 2021)  - * Echo 6/2024 showed LVEF 20-25%, segmental wall motion globally hypokinetic.   * Nuc stress test 10/22 was abnormal but no evidence of ischemia; medium sized area of infarction in the entire inferolateral segment(s) of the left ventricle extending into basal inferior segment; small area of nontransmural infarction in the apical segment(s) of the left ventricle; TID absent; left ventricular ejection fraction at stress 26%.     -Cardiology consulted.   -Initial concern for cardiogenic shock, followed by emergent diagnostic coronary angiogram which showed stable moderate to severe distal coronary artery disease.  No significant lesions in the left main or any proximal or ostial lesions.  -Patient has been started on IV dobutamine at 5 mg/h overnight titrated to 2.5 mg/h and has been discontinued on 11/29  -Continue to monitor blood pressure closely since patient is off IV dobutamine, resting blood pressure continues to remain stable but  orthostatic hypotension's are persistently positive  -Consider to restart carvedilol and Entresto once patient more stable.  -Diuretic management according to nephrology.  -Due to concern for positive orthostatic vitals on 11/third DIS, 12/one 1 L of IVF LR bolus has been given discussed with nephrology since resting blood pressure continued to be stable and only orthostatic blood pressure and symptomatic dizziness patient is started on midodrine to be given as needed during the day.  If patient is requiring multiple doses can schedule midodrine likely to be helpful.     # Chronic A-fib:   Rate controlled: Hold PTA carvedilol  Anticoagulation: PTA on Coumadin.  Admission INR of 5.55.  -Patient was initially started on IV heparin which has been stopped per cardiology recommendations  -Warfarin management per pharmacy recommendations.     #recent admission from 10/15 - 11/9 with Critical limb ischemia of the left lower extremity.  # S/p left femoral endarterectomy with bovine pericardial patch angioplasty; left distal common femoral artery/proximal superficial femoral artery to tibioperoneal trunk bypass; and temporary closure of left groin wound with wound VAC 10/25/2024.  # S/p ligation of side branches of the left great saphenous vein and temporary closure of right groin with application of wound VAC 10/27/2024.  Ischemic left great toe wound - s/p amputation of left great toe 10/27/2024.  Left groin wound infection.  H/o acute RLE arterial occlusion s/p SFA popliteal balloon angioplasty and stenting (5/2024).  -Appreciate vascular surgery review in ER.  No concerns for active infection  -Continue cares per vascular surgery  -Every 4 Doppler checks to bilateral lower extremity.  -Dressing changes to the left groin and leg incisions along with toe wound and continue aspirin 81 mg, Plavix 75 mg daily     # ESKD secondary to diabetes mellitus nephropathy on PD  # Secondary hyperparathyroidism  -Nephrology  consult  -Continue ongoing peritoneal dialysis.  Next episode tonight  -Continue diuretic management according to nephrology.     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 Indication: AMI (NSTEMI/ STEMI) (48 hours)  Code Status: Full Code      Clinically Significant Risk Factors         # Hyponatremia: Lowest Na = 131 mmol/L in last 2 days, will monitor as appropriate  # Hypochloremia: Lowest Cl = 96 mmol/L in last 2 days, will monitor as appropriate          # Hypertension: Noted on problem list                # Financial/Environmental Concerns: none         Social Drivers of Health    Tobacco Use: Medium Risk (11/27/2024)    Patient History     Smoking Tobacco Use: Former     Smokeless Tobacco Use: Never   Alcohol Use: Unknown (11/28/2018)    Received [facility]    AUDIT-C     Frequency of Alcohol Consumption: Monthly or less     Frequency of Binge Drinking: Never   Physical Activity: Unknown (10/8/2024)    Exercise Vital Sign     Days of Exercise per Week: 0 days   Social Connections: Unknown (10/8/2024)    Social Connection and Isolation Panel [NHANES]     Frequency of Social Gatherings with Friends and Family: Patient declined          Disposition Plan     Medically Ready for Discharge: Anticipated Tomorrow    Persistent orthostatic hypotension with dizziness, nephrology managing diuresis and peritoneal dialysis.  Though there is a component of hypovolemia related orthostatic hypotension also concern for diabetic autonomic dysfunction  leading to resting blood pressure being stable back positional hypotension.  Midodrine as needed during day has been prescribed.  Monitor for improvement and likely discharge if stable tomorrow a.m.         Dima Romo MD  Hospitalist Service  [facility]  Securely message with [employer] (more info)  Text page via [facility] Paging/Directory   ______________________________________________________________________    Interval History   Repeat assessment of orthostatic vitals this morning was positive along with symptoms of dizziness.  Despite baseline blood pressure remaining stable as orthostatic blood pressure is low and patient is having dizziness 1 L of IVF LR was given today despite which orthostatic vitals are positive.  Patient has been started on midodrine as needed and reassess need for increasing dose.    Physical Exam   Vital Signs: Temp: 98.4  F (36.9  C) Temp src: Oral BP: (!) 128/97 Pulse: 109   Resp: 18 SpO2: 98 % O2 Device: None (Room air)    Weight: 169 lbs 1.49 oz    Constitutional: Awake, alert, cooperative, no apparent distress  Respiratory: Clear to auscultation bilaterally, no crackles or wheezing  Cardiovascular: Regular rate and rhythm, normal S1 and S2, and no murmur noted  GI: Normal bowel sounds, soft, non-distended, non-tender  Skin/Integumen: No rashes, no cyanosis, no edema  Other: Alert oriented x 3 no apparent focal deficits.

## 2024-12-01 NOTE — PROGRESS NOTES
Renal Medicine Progress Note            Assessment/Plan:     Arnulfo Pritchett is a 64 year old male CAD, HTN, HLD, pafib, HFrEF (EF 20-25%), ESRD on PD, DM2, TALI, RCC s/p R nephrectomy (2022), RAD with multiple LE procedures who was admitted on 11/27/2024 with severe hypotension and generalized weakness.      Assessment:  1) hypotension: Appears all hypovolemic nature.  Patient does not adjust his PD solution has been doing although 2.5% solutions without adjustments.  On his own he was decreasing carvedilol dosing and frequency but appears worsened hypotension recently. Counseled on adjusting PD prescription based on weight/ volume status. Needs to be followed closely by PD team in outpt .      BP is back to his baseline, but continues to have orthostatic hypotension  May still be hypovolemic but likely major component of autonomic dysfunction with diabetes.  Okay with IV LR x 1 L  Discussed postural precautions  If continues to have orthostatic hypotension, suggest midodrine 2.5 mg as needed, especially in the morning after getting off PD and when he is ambulatory       2) end stage renal disease on PD  Chronic PD for last 3.5 years.  Home unit [facility], nephrologist Dr. May  Rx: 5 cycles, 2 L fill volumes, 9 hours, last fill 1.2 L with external.  Target weight reported at 79 kg.  Has had significant weight loss over the last number of months.     Anemia in goal range, 10.2  Noted mild hyponatremia 131     Other:  A-fib on warfarin  Known HFrEF, status post coronary angiogram with no interventions  PAD, status post left lower extremity revascularization 10/25/2024     Plan/Recs:  1) PD orders placed for tonight-all 1.5%.  Plan for minimal UF.                Interval History:     Seen for PD management.   Had some alarms with initial drains.  Completed PD.  Negative UF of 300 cc.  Noted to have orthostatic hypotension.  Getting 1 L LR.  Reports getting mildly dizzy on sitting up, not so much when he  stood up.  Has supine hypertension.         Medications and Allergies:     Current Facility-Administered Medications   Medication Dose Route Frequency Provider Last Rate Last Admin    allopurinol (ZYLOPRIM) tablet 200 mg  200 mg Oral QPM Maurizio Glover MD   200 mg at 11/30/24 2024    atorvastatin (LIPITOR) tablet 40 mg  40 mg Oral At Bedtime Maurizio Glover MD   40 mg at 11/30/24 2024    calcitRIOL (ROCALTROL) capsule 0.25 mcg  0.25 mcg Oral At Bedtime Maurizio Glover MD   0.25 mcg at 11/30/24 2024    cinacalcet (SENSIPAR) tablet 60 mg  60 mg Oral Once per day on Monday Wednesday Friday Maurizio Glover MD   60 mg at 11/29/24 1157    clopidogrel (PLAVIX) tablet 75 mg  75 mg Oral QPM Maurizio Glover MD   75 mg at 11/30/24 2024    insulin glargine (LANTUS PEN) injection 20 Units  20 Units Subcutaneous At Bedtime Maurizio Glover MD   20 Units at 11/30/24 2305    pantoprazole (PROTONIX) EC tablet 40 mg  40 mg Oral QAM AC Maurizio Glover MD   40 mg at 12/01/24 0946    sevelamer carbonate (RENVELA) tablet 800 mg  800 mg Oral TID w/meals Maurizio Glover MD   800 mg at 12/01/24 1314    sodium chloride (PF) 0.9% PF flush 3 mL  3 mL Intracatheter Q8H Tamy Guthrie PA-C   3 mL at 11/30/24 2025    warfarin ANTICOAGULANT (COUMADIN) tablet 6 mg  6 mg Oral ONCE at 18:00 Maurizio Glover MD        Warfarin Dose Required Daily - Pharmacist Managed  1 each Oral See Admin Instructions Dima Romo MD          No Known Allergies         Physical Exam:   Vitals were reviewed  BP (!) 138/98   Pulse 104   Temp 97.8  F (36.6  C) (Oral)   Resp 18   Wt 76.7 kg (169 lb 1.5 oz)   SpO2 98%   BMI 23.16 kg/m      Wt Readings from Last 3 Encounters:   12/01/24 76.7 kg (169 lb 1.5 oz)   11/26/24 76.2 kg (168 lb)   11/09/24 76.5 kg (168 lb 10.4 oz)       GENERAL: NAD   HEENT:  Normocephalic  CV: irregularly irregular  RESP: Clear anteriorly  GI: soft, nontender  MUSCULOSKELETAL: No edema  NEURO:   Alert, oriented, normal speech  PSYCH:  "mood good, affect appropriate  Access-PD catheter in place              Data:     BMP  Recent Labs   Lab 12/01/24  1310 12/01/24  0927 12/01/24  0144 11/30/24  2125 11/30/24  0935 11/30/24  0545 11/29/24  1314 11/29/24  1126 11/28/24 2222 11/28/24  0535 11/27/24  1949 11/27/24  1035   NA  --   --   --   --   --  131*  --  134*  --  130*  --  129*   POTASSIUM  --   --   --   --   --  3.9  --  4.4  --  4.1  --  4.9   CHLORIDE  --   --   --   --   --  96*  --  96*  --  94*  --  89*   TERENCE  --   --   --   --   --  8.3*  --  8.4*  --  8.6*  --  9.2   CO2  --   --   --   --   --  25  --  26  --  24  --  26   BUN  --   --   --   --   --  37.0*  --  38.2*  --  40.5*  --  41.0*   CR  --   --   --   --   --  7.48*  --  7.66*  --  7.23*  --  7.58*   * 96 160* 145*   < > 162*   < > 80   < > 151*   < > 150*    < > = values in this interval not displayed.     CBC  Recent Labs   Lab 11/30/24  0545 11/28/24  0535 11/27/24  1035   WBC 5.9 5.9 7.7   HGB 10.2* 10.5* 11.4*   HCT 31.6* 31.6* 35.6*   MCV 95 94 100    281 395     No results found for: \"AST\", \"ALT\", \"GGT\", \"ALKPHOS\", \"BILITOTAL\", \"BILICONJ\", \"BILIDIRECT\", \"SHELTON\"  Lab Results   Component Value Date    INR 1.87 (H) 12/01/2024       Attestation:  I have reviewed today's vital signs, notes, medications, labs and imaging.    Jennifer Jameson MD  [facility] - Nephrology  Office: [phone]      "

## 2024-12-01 NOTE — PLAN OF CARE
Reason for Admission:   Critical Limb Ischemia and ischemic L great toe wound(10/15-)  Current admission for Generalized Weakness    Cognitive/Mentation: A/Ox 4; calm, cooperative, sometimes irritable.    VS: VSS on RA.   Tele: SR first degree AVB PVCs.  /GI: Continent.  Pulmonary: LS clear.  Pain: denies    Drains/Lines: PIV SL  Skin: PD site, CDI, L groin CDI, L leg sutures intact, Great toe wound.   Activity: Assist x 1 with GB.  Diet: Renal Diet   B 160    Discharge: TCU today.

## 2024-12-01 NOTE — PLAN OF CARE
Goal Outcome Evaluation:    Plan Of Care Reviewed With: Patient     Heart Failure Care Map  GOALS TO BE MET BEFORE DISCHARGE:    1. Decrease congestion and/or edema with diuretic therapy to achieve near optimal volume status.     Dyspnea improved: Yes, satisfactory for discharge.   Edema improved: Yes, satisfactory for discharge.        Last 24 hour I/O:   Intake/Output Summary (Last 24 hours) at 11/30/2024 2232  Last data filed at 11/30/2024 2000  Gross per 24 hour   Intake 1650 ml   Output 550 ml   Net 1100 ml           Net I/O and Weights since admission:   10/31 2300 - 11/30 2259  In: 4526 [P.O.:2900; I.V.:6]  Out: 2550 [Urine:2150]  Net: 1976     Vitals:    11/28/24 0603 11/28/24 1300 11/29/24 0628 11/30/24 0608   Weight: 81 kg (178 lb 9.2 oz) 75.3 kg (166 lb) 79 kg (174 lb 2.6 oz) 78.2 kg (172 lb 6.4 oz)       2.  O2 sats > 90% on room air, or at prior home O2 therapy level.      Able to wean O2 this shift to keep sats above 90%?: Yes, satisfactory for discharge.   Does patient use Home O2? No           Current oxygenation status:   SpO2: 95 %     O2 Device: None (Room air),      3.  Tolerates ambulation and mobility near baseline.     Ambulation: Yes, satisfactory for discharge.   Times patient ambulated with staff this shift: 0    Please review the Heart Failure Care Map for additional HF goal outcomes.    Alexus Jacome RN  11/30/2024      Neuro: A/Ox4 calm cooperative and pleasant   CV/Rhythm: SR first degree AVB PVC's   Resp/O2: RA   GI/Diet: Renal diet   : Peritoneal dialysis during the night   Skin/Incisions/Sites: Wound ostomy following multiple wounds   Pulses/CMS: Unchanged  Activity/Falls Risk: Falls risk up with assist x1 gait belt walker   Lines/Drains/IV's: PIV, Peritoneal drain patent dressing changed   Labs/BGM:  145  VS/Pain: Vitals stable declines pain   D/C Plan/Overall Plan: Anticipate discharge 12/1

## 2024-12-01 NOTE — PLAN OF CARE
Goal Outcome Evaluation:      Plan of Care Reviewed With: patient    Overall Patient Progress: no changeOverall Patient Progress: no change     Heart Failure Care Map  GOALS TO BE MET BEFORE DISCHARGE:    1. Decrease congestion and/or edema with diuretic therapy to achieve near optimal volume status.     Dyspnea improved: No, further care required to meet this goal. Please explain MORTENSEN   Edema improved: Yes, satisfactory for discharge.        Last 24 hour I/O:   Intake/Output Summary (Last 24 hours) at 12/1/2024 1352  Last data filed at 12/1/2024 1300  Gross per 24 hour   Intake 2080 ml   Output 675 ml   Net 1405 ml           Net I/O and Weights since admission:   11/01 1500 - 12/01 1459  In: 6126 [P.O.:3500; I.V.:6]  Out: 3075 [Urine:2675]  Net: 3051     Vitals:    11/28/24 0603 11/28/24 1300 11/29/24 0628 11/30/24 0608   Weight: 81 kg (178 lb 9.2 oz) 75.3 kg (166 lb) 79 kg (174 lb 2.6 oz) 78.2 kg (172 lb 6.4 oz)    12/01/24 0444   Weight: 76.7 kg (169 lb 1.5 oz)       2.  O2 sats > 90% on room air, or at prior home O2 therapy level.      Able to wean O2 this shift to keep sats above 90%?: Yes, satisfactory for discharge.   Does patient use Home O2? No          Current oxygenation status:   SpO2: 98 %     O2 Device: None (Room air),      3.  Tolerates ambulation and mobility near baseline.     Ambulation: Yes, satisfactory for discharge.   Times patient ambulated with staff this shift: 1    Please review the Heart Failure Care Map for additional HF goal outcomes.    Josephine Mcgill RN  12/1/2024   Neuro:intact  CV/Rhythm:orthostatic hypotension, 1 L IVF administered, will recheck   Resp/02:RA MORTENSEN  GI/Diet:renal diet  :voiding, PD  Skin/Incisions/Sites:R outer heel pink/red with slough, L great toe scabbed, L groin wound, dressing change done, staples L medial leg thigh to calf  Pulses/CMS:doppler pedals  Edema:-  Activity/Falls Risk:fall risk, walker, GB  Lines/Drains/IVs:PIV   Labs/BGM:BGM 96,  112  Test/Procedures:-  VS/Pain:stable, pain with ambulation LLE  DC Plan:TCU when ready  Other: with ambulation

## 2024-12-01 NOTE — PROGRESS NOTES
Care Management Follow Up    Length of Stay (days): 4    Expected Discharge Date: 12/02/2024     Concerns to be Addressed: discharge planning     Patient plan of care discussed at interdisciplinary rounds: Yes    Anticipated Discharge Disposition: Skilled Nursing Facility              Anticipated Discharge Services: None  Anticipated Discharge DME: None    Patient/family educated on Medicare website which has current facility and service quality ratings: no  Education Provided on the Discharge Plan: Yes  Patient/Family in Agreement with the Plan: yes    Referrals Placed by CM/SW:  post acute facilities   Private pay costs discussed: Not applicable    Discussed  Partnership in Safe Discharge Planning  document with patient/family: No     Handoff Completed: Yes, [facility] PCP: Internal handoff referral completed    Additional Information:  Writer called [facility] Admissions and spoke with Zion. She confirmed patient can return over the weekend if ready today and there are no barriers to his return. Will update Zion if writer hears patient is ready for discharge.     Writer paged  to determine if patient will be ready for discharge today. Updated from  that patient is not ready this AM but will keep writer updated.     Next Steps: transport, orders    GHASSAN Garnica, MaineGeneral [facility] CenterSW  Social Work- Inpatient Care Coordination  [facility]

## 2024-12-02 ENCOUNTER — APPOINTMENT (OUTPATIENT)
Dept: PHYSICAL THERAPY | Facility: CLINIC | Age: 64
DRG: 280 | End: 2024-12-02
Payer: MEDICARE

## 2024-12-02 ENCOUNTER — TELEPHONE (OUTPATIENT)
Dept: OTHER | Facility: CLINIC | Age: 64
End: 2024-12-02
Payer: COMMERCIAL

## 2024-12-02 DIAGNOSIS — I73.9 PAD (PERIPHERAL ARTERY DISEASE) (H): Primary | ICD-10-CM

## 2024-12-02 LAB
BACTERIA BLD CULT: NO GROWTH
GLUCOSE BLDC GLUCOMTR-MCNC: 131 MG/DL (ref 70–99)
GLUCOSE BLDC GLUCOMTR-MCNC: 133 MG/DL (ref 70–99)
GLUCOSE BLDC GLUCOMTR-MCNC: 152 MG/DL (ref 70–99)
GLUCOSE BLDC GLUCOMTR-MCNC: 86 MG/DL (ref 70–99)
GLUCOSE BLDC GLUCOMTR-MCNC: 87 MG/DL (ref 70–99)
INR PPP: 1.92 (ref 0.85–1.15)

## 2024-12-02 PROCEDURE — 999N000111 HC STATISTIC OT IP EVAL DEFER

## 2024-12-02 PROCEDURE — 85610 PROTHROMBIN TIME: CPT | Performed by: INTERNAL MEDICINE

## 2024-12-02 PROCEDURE — G0463 HOSPITAL OUTPT CLINIC VISIT: HCPCS

## 2024-12-02 PROCEDURE — 258N000003 HC RX IP 258 OP 636: Performed by: INTERNAL MEDICINE

## 2024-12-02 PROCEDURE — 90999 UNLISTED DIALYSIS PROCEDURE: CPT

## 2024-12-02 PROCEDURE — 90945 DIALYSIS ONE EVALUATION: CPT | Performed by: INTERNAL MEDICINE

## 2024-12-02 PROCEDURE — 250N000011 HC RX IP 250 OP 636: Performed by: INTERNAL MEDICINE

## 2024-12-02 PROCEDURE — 250N000013 HC RX MED GY IP 250 OP 250 PS 637: Performed by: INTERNAL MEDICINE

## 2024-12-02 PROCEDURE — 99233 SBSQ HOSP IP/OBS HIGH 50: CPT | Performed by: INTERNAL MEDICINE

## 2024-12-02 PROCEDURE — 36415 COLL VENOUS BLD VENIPUNCTURE: CPT | Performed by: INTERNAL MEDICINE

## 2024-12-02 PROCEDURE — 210N000002 HC R&B HEART CARE

## 2024-12-02 PROCEDURE — 97110 THERAPEUTIC EXERCISES: CPT | Mod: GP

## 2024-12-02 RX ORDER — GENTAMICIN SULFATE 1 MG/G
CREAM TOPICAL DAILY PRN
Status: DISCONTINUED | OUTPATIENT
Start: 2024-12-02 | End: 2024-12-03 | Stop reason: HOSPADM

## 2024-12-02 RX ORDER — CARVEDILOL 3.12 MG/1
3.12 TABLET ORAL 2 TIMES DAILY WITH MEALS
Status: DISCONTINUED | OUTPATIENT
Start: 2024-12-02 | End: 2024-12-03 | Stop reason: HOSPADM

## 2024-12-02 RX ADMIN — SEVELAMER CARBONATE 800 MG: 800 TABLET, FILM COATED ORAL at 09:53

## 2024-12-02 RX ADMIN — WARFARIN SODIUM 6.5 MG: 4 TABLET ORAL at 17:56

## 2024-12-02 RX ADMIN — SEVELAMER CARBONATE 800 MG: 800 TABLET, FILM COATED ORAL at 14:02

## 2024-12-02 RX ADMIN — PANTOPRAZOLE SODIUM 40 MG: 40 TABLET, DELAYED RELEASE ORAL at 09:53

## 2024-12-02 RX ADMIN — GENTAMICIN SULFATE: 1 OINTMENT TOPICAL at 20:34

## 2024-12-02 RX ADMIN — CINACALCET 60 MG: 30 TABLET ORAL at 09:53

## 2024-12-02 RX ADMIN — INSULIN GLARGINE 20 UNITS: 100 INJECTION, SOLUTION SUBCUTANEOUS at 21:31

## 2024-12-02 RX ADMIN — CALCITRIOL CAPSULES 0.25 MCG 0.25 MCG: 0.25 CAPSULE ORAL at 21:31

## 2024-12-02 RX ADMIN — CLOPIDOGREL BISULFATE 75 MG: 75 TABLET ORAL at 21:31

## 2024-12-02 RX ADMIN — CARVEDILOL 3.12 MG: 3.12 TABLET, FILM COATED ORAL at 09:55

## 2024-12-02 RX ADMIN — CARVEDILOL 3.12 MG: 3.12 TABLET, FILM COATED ORAL at 17:57

## 2024-12-02 RX ADMIN — ALLOPURINOL 200 MG: 100 TABLET ORAL at 21:31

## 2024-12-02 RX ADMIN — ONDANSETRON 4 MG: 4 TABLET, ORALLY DISINTEGRATING ORAL at 05:54

## 2024-12-02 RX ADMIN — SEVELAMER CARBONATE 800 MG: 800 TABLET, FILM COATED ORAL at 17:57

## 2024-12-02 RX ADMIN — SODIUM CHLORIDE 500 ML: 9 INJECTION, SOLUTION INTRAVENOUS at 14:03

## 2024-12-02 RX ADMIN — ATORVASTATIN CALCIUM 40 MG: 40 TABLET, FILM COATED ORAL at 21:31

## 2024-12-02 ASSESSMENT — ACTIVITIES OF DAILY LIVING (ADL)
ADLS_ACUITY_SCORE: 42

## 2024-12-02 NOTE — PROGRESS NOTES
Cycler set up per protocol and per treatment orders     Results from previous treatment:  Effluent color and clarity: yellow and clear, no fibrin  Total UF: 153  Initial Drain: 37 ml  Avg Dwell: 1:17  Lost Dwell: 0:23    Cycler Serial Number: [id number]  Total Treatment Volume: 51265 mL  Total treatment time: 9 hrs  Fill Volume: 2000 ml  Last Fill Volume:0 ml  Heater bag 1.5% 6000mL;  Lot #: [id number];  Expiration Date: 7/26  Side Bag #1: 1.5% 6000mL;  Lot #: [id number];  Expiration Date: 7/26  Number of cycles including final fill: 5  Dwell Time: 1:22 minutes  Last Fill Volume: 0 ml  ID orders reviewed with Patient procedure and ESRD teaching done and questions answered.  Cycler ready for hook-up.    Report given to: MARYSOL Mendez consent form signed yes

## 2024-12-02 NOTE — PROVIDER NOTIFICATION
MD Notification    Notified Person: MD    Notified Person Name: Evelia Appiah PA-C vascular    Notification Date/Time: 12/2/24 1130    Notification Interaction: Talked to provider    Purpose of Notification: Left groin incision has some yellow drainage, does the dressing change need to be different?     Orders Received: Provider saw incision, no changes at this time    Comments:

## 2024-12-02 NOTE — PROGRESS NOTES
Vascular Surgery Progress Note    Patient is feeling well this morning. The remaining staples to his left medial leg were removed. Continue dressing changes to left groin. Monophasic DP and PT pulses bilaterally. Patient is hoping to return to TCU tomorrow.     Evelia Appiah PA-C  Vascular Surgery

## 2024-12-02 NOTE — PROGRESS NOTES
Renal Medicine Progress Note            Assessment/Plan:     Arnulfo Pritchett is a 64 year old male CAD, HTN, HLD, pafib, HFrEF (EF 20-25%), ESRD on PD, DM2, TALI, RCC s/p R nephrectomy (2022), RAD with multiple LE procedures who was admitted on 11/27/2024 with severe hypotension and generalized weakness.      Assessment:  1) hypotension: Appears all hypovolemic nature.  Patient does not adjust his PD solution has been doing although 2.5% solutions without adjustments.  On his own he was decreasing carvedilol dosing and frequency but appears worsened hypotension recently. Counseled on adjusting PD prescription based on weight/ volume status. Needs to be followed closely by PD team in outpt .      Still some orthostatic vitals this morning.     2) end stage renal disease on PD  Chronic PD for last 3.5 years.  Home unit [facility], nephrologist Dr. May  Rx: 5 cycles, 2 L fill volumes, 9 hours, last fill 1.2 L with external.  Target weight reported at 79 kg.  Has had significant weight loss over the last number of months.     Anemia in goal range, 10.2 last value  Noted mild hyponatremia 131 last on 11/3/2024     Other:  A-fib on warfarin  Known HFrEF, status post coronary angiogram with no interventions  PAD, status post left lower extremity revascularization 10/25/2024     Plan/Recs:  1) PD orders placed for tonight-all 1.5%.  Goal for minimal UF again.  2) Expect continued need for 1.5% solutions at TCU.  Patient reports using his own supplies which have historically only been 2.5% solutions delivered.  [place] or his home unit will need to arrange for 1.5% solution be available.      Duncan Lomeli DO  [facility] consultants  Office: [phone]  Cell: [phone]        Interval History:     Patient doing well.  Slightly orthostatic this morning which not typical for him.  Notes reflect he is staying another night to reassess tomorrow.  Slight ultrafiltration overnight with all 1.5% solutions again  performed.          Medications and Allergies:     Current Facility-Administered Medications   Medication Dose Route Frequency Provider Last Rate Last Admin    allopurinol (ZYLOPRIM) tablet 200 mg  200 mg Oral QPM Maurizio Glover MD   200 mg at 12/01/24 1932    atorvastatin (LIPITOR) tablet 40 mg  40 mg Oral At Bedtime Maurizio Glover MD   40 mg at 12/01/24 2204    calcitRIOL (ROCALTROL) capsule 0.25 mcg  0.25 mcg Oral At Bedtime Maurizio Glover MD   0.25 mcg at 12/01/24 2204    carvedilol (COREG) tablet 3.125 mg  3.125 mg Oral BID w/meals Leonor Haddad MD   3.125 mg at 12/02/24 0955    cinacalcet (SENSIPAR) tablet 60 mg  60 mg Oral Once per day on Monday Wednesday Friday Maurizio Glover MD   60 mg at 12/02/24 0953    clopidogrel (PLAVIX) tablet 75 mg  75 mg Oral QPM Maurizio Glover MD   75 mg at 12/01/24 1932    insulin glargine (LANTUS PEN) injection 20 Units  20 Units Subcutaneous At Bedtime Maurizio Glover MD   20 Units at 12/01/24 2204    pantoprazole (PROTONIX) EC tablet 40 mg  40 mg Oral QAM AC Maurizio Glover MD   40 mg at 12/02/24 0953    sevelamer carbonate (RENVELA) tablet 800 mg  800 mg Oral TID w/meals Maurizio Glover MD   800 mg at 12/02/24 0953    sodium chloride (PF) 0.9% PF flush 3 mL  3 mL Intracatheter Q8H Tamy Guthrie PA-C   3 mL at 12/02/24 0957    sodium chloride 0.9% BOLUS 500 mL  500 mL Intravenous Once Leonor Haddad MD        warfarin ANTICOAGULANT (COUMADIN) tablet 6.5 mg  6.5 mg Oral ONCE at 18:00 Maurizio Glover MD        Warfarin Dose Required Daily - Pharmacist Managed  1 each Oral See Admin Instructions Dima Romo MD          No Known Allergies         Physical Exam:   Vitals were reviewed  /86   Pulse 120   Temp 97.9  F (36.6  C) (Oral)   Resp 18   Wt 76.7 kg (169 lb 1.5 oz)   SpO2 97%   BMI 23.16 kg/m      Wt Readings from Last 3 Encounters:   12/01/24 76.7 kg (169 lb 1.5 oz)   11/26/24 76.2 kg (168 lb)   11/09/24 76.5 kg (168 lb 10.4 oz)  "      Intake/Output Summary (Last 24 hours) at 12/2/2024 1308  Last data filed at 12/2/2024 0803  Gross per 24 hour   Intake 880 ml   Output 1070 ml   Net -190 ml       GENERAL: NAD   HEENT:  Normocephalic  CV: irregularly irregular  RESP: Clear anteriorly  GI: soft, nontender  MUSCULOSKELETAL: No edema  NEURO:   Alert, oriented, normal speech  PSYCH: mood good, affect appropriate  Access-PD catheter in place           Data:     BMP  Recent Labs   Lab 12/02/24  0915 12/02/24  0154 12/01/24 2122 12/01/24  1757 11/30/24  0935 11/30/24  0545 11/29/24  1314 11/29/24  1126 11/28/24 2222 11/28/24  0535 11/27/24  1949 11/27/24  1035   NA  --   --   --   --   --  131*  --  134*  --  130*  --  129*   POTASSIUM  --   --   --   --   --  3.9  --  4.4  --  4.1  --  4.9   CHLORIDE  --   --   --   --   --  96*  --  96*  --  94*  --  89*   TERENCE  --   --   --   --   --  8.3*  --  8.4*  --  8.6*  --  9.2   CO2  --   --   --   --   --  25  --  26  --  24  --  26   BUN  --   --   --   --   --  37.0*  --  38.2*  --  40.5*  --  41.0*   CR  --   --   --   --   --  7.48*  --  7.66*  --  7.23*  --  7.58*   GLC 86 131* 144* 130*   < > 162*   < > 80   < > 151*   < > 150*    < > = values in this interval not displayed.     CBC  Recent Labs   Lab 11/30/24  0545 11/28/24  0535 11/27/24  1035   WBC 5.9 5.9 7.7   HGB 10.2* 10.5* 11.4*   HCT 31.6* 31.6* 35.6*   MCV 95 94 100    281 395     No results found for: \"AST\", \"ALT\", \"GGT\", \"ALKPHOS\", \"BILITOTAL\", \"BILICONJ\", \"BILIDIRECT\", \"SHELTON\"  Lab Results   Component Value Date    INR 1.92 (H) 12/02/2024       Attestation:  I have reviewed today's vital signs, notes, medications, labs and imaging.    DO [facility] - Nephrology  Office: [phone]  Cell: [phone]    "

## 2024-12-02 NOTE — PLAN OF CARE
Goal Outcome Evaluation:      Plan of Care Reviewed With: patient, child           Heart Failure Care Map  GOALS TO BE MET BEFORE DISCHARGE:    1. Decrease congestion and/or edema with diuretic therapy to achieve near optimal volume status.     Dyspnea improved: Yes, satisfactory for discharge.   Edema improved: Yes, satisfactory for discharge.        Last 24 hour I/O:   Intake/Output Summary (Last 24 hours) at 12/2/2024 1728  Last data filed at 12/2/2024 1700  Gross per 24 hour   Intake 1080 ml   Output 1120 ml   Net -40 ml           Net I/O and Weights since admission:   11/02 2300 - 12/02 2259  In: 7606 [P.O.:4980; I.V.:6]  Out: 4395 [Urine:3995]  Net: 3211     Vitals:    11/28/24 0603 11/28/24 1300 11/29/24 0628 11/30/24 0608   Weight: 81 kg (178 lb 9.2 oz) 75.3 kg (166 lb) 79 kg (174 lb 2.6 oz) 78.2 kg (172 lb 6.4 oz)    12/01/24 0444   Weight: 76.7 kg (169 lb 1.5 oz)       2.  O2 sats > 90% on room air, or at prior home O2 therapy level.      Able to wean O2 this shift to keep sats above 90%?:yes         Current oxygenation status:   SpO2: 97 %     O2 Device: None (Room air),      3.  Tolerates ambulation and mobility near baseline.     Ambulation: Yes, satisfactory for discharge.   Times patient ambulated with staff this shift: 2    Please review the Heart Failure Care Map for additional HF goal outcomes.         Pt aox4. A1gbw, RA and full code. Tele ST. -150 with activity, started low dose Coreg. Pt denies CP and SOB.  Wound on L thigh, L groin and R/L heels. Up to chair x2, Denies pain at rest. Podiatry consult. NS bolus given over 4 hrs. BG 86, 133.   Plan: Will need placement at discharge.                    Calli Rodriguez, RN  12/2/2024

## 2024-12-02 NOTE — PROGRESS NOTES
[facility]    Hospitalist Progress Note    Assessment & Plan   Arnulfo Pritchett is a 64 year old male with history of coronary artery disease with multiple stents, hypertension, hyperlipidemia, chronic paroxysmal A-fib  (s/p ablation), ESRD on PD, secondary hyperparathyroidism, RCC  (s/p nephrectomy), DM type II, gout, GERD, TALI who was recently admitted to the hospital from 10/15 - 11/9 for critical limb ischemia of the left lower extremity and ischemic left great toe wound (see details of surgery below ), now is being admitted on 11/27/2024 with generalized weakness.        Hypotension on admission: Responded to 1 unit PRBC, 1 L normal saline   Lactic acidosis  Non-STEMI versus nonischemic myocardial injury due to significant hypotension (recent nuclear test with no ischemia ), admission troponin 564 and BNP of 18, 739    History ofcoronary artery disease with multiple stents (last in 2021)  - * Echo 6/2024 showed LVEF 20-25%, segmental wall motion globally hypokinetic.   * Nuc stress test 10/22 was abnormal but no evidence of ischemia; medium sized area of infarction in the entire inferolateral segment(s) of the left ventricle extending into basal inferior segment; small area of nontransmural infarction in the apical segment(s) of the left ventricle; TID absent; left ventricular ejection fraction at stress 26%.  Was seen by cardiology this admission and have signed off.  -Initial concern for cardiogenic shock, followed by emergent diagnostic coronary angiogram which showed stable moderate to severe distal coronary artery disease.  No significant lesions in the left main or any proximal or ostial lesions.  -Patient has been started on IV dobutamine at 5 mg/h overnight titrated to 2.5 mg/h and has been discontinued on 11/29  -Patient is currently off of dobutamine IV but blood pressures are still an issue when orthostatic blood pressures are checked, patient also has had dizziness with  changing position.  Cardiology recommended to restart carvedilol and Entresto half the dose and increase accordingly outpatient.  Nephrology was supportive of doing as needed midodrine, so far blood pressures are stable but patient is symptomatic.  Continue with as needed for now.  -12/2 Entresto is on hold, Coreg started at 3.125 twice daily dosage.  Monitor blood pressures, patient was given 500 cc normal saline bolus same day.  -Diuretic management according to nephrology.  -Will plan discharge back to rehab with as needed midodrine if blood pressures are on the lower end and patient is symptomatic.  I am hesitant to  scheduled midodrine due to concern of underlying coronary artery disease.       Chronic A-fib:   Rate controlled: Hold PTA carvedilol  Anticoagulation: PTA on Coumadin.  Admission INR of 5.55.  -Patient was initially started on IV heparin which has been stopped per cardiology recommendations,INR is stable today at 1.92.  -Warfarin management per pharmacy recommendations.     Recent admission from 10/15 - 11/9 with Critical limb ischemia of the left lower extremity.   S/p left femoral endarterectomy with bovine pericardial patch angioplasty; left distal common femoral artery/proximal superficial femoral artery to tibioperoneal trunk bypass; and temporary closure of left groin wound with wound VAC 10/25/2024.  S/p ligation of side branches of the left great saphenous vein and temporary closure of right groin with application of wound VAC 10/27/2024.  Ischemic left great toe wound - s/p amputation of left great toe 10/27/2024.  Left groin wound infection.  H/o acute RLE arterial occlusion s/p SFA popliteal balloon angioplasty and stenting (5/2024).  -Appreciate vascular surgery review in ER.  No concerns for active infection  -Continue cares per vascular surgery  -Every 4 Doppler checks to bilateral lower extremity.  -Dressing changes to the left groin and leg incisions along with toe wound and  continue aspirin 81 mg, Plavix 75 mg daily     ESKD secondary to diabetes mellitus nephropathy on PD  Secondary hyperparathyroidism  -Nephrology consult  -Continue ongoing peritoneal dialysis.  Next episode tonight  -Continue diuretic management according to nephrology.     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  Medically Ready for Discharge: Anticipated Tomorrow    Clinically Significant Risk Factors                   # Hypertension: Noted on problem list                # Financial/Environmental Concerns: none         Leonor Haddad MD  Text Page   (7am to 6pm)    Interval History   Patient continues to be symptomatic in terms of her orthostatic hypotension, the test was positive today morning, he had dizziness with activity, 500 cc normal saline ordered, at this point to control heart rate need to restart Coreg half the dose, Entresto is on hold, might need to start midodrine as needed if symptoms persist.    -Data reviewed today: I reviewed all new labs and imaging results over the last 24 hours.  Physical Exam     Vital Signs with Ranges  Temp:  [97.9  F (36.6  C)-99.8  F (37.7  C)] 97.9  F (36.6  C)  Pulse:  [100-160] 120  Resp:  [18] 18  BP: (107-157)/() 107/86  SpO2:  [96 %-99 %] 97 %  I/O last 3 completed shifts:  In: 2480 [P.O.:1480; IV Piggyback:1000]  Out: 1195 [Urine:1195]    Constitutional: Awake, alert, cooperative, no apparent distress  Respiratory: Clear to auscultation bilaterally, no crackles or wheezing  Cardiovascular: Regular rate and  rhythm, normal S1 and S2, and no murmur noted  GI: Normal bowel sounds, soft, non-distended, non-tender  Skin/Integumen: Left lower extremity healing surgical wound noted  Neuro : moving all 4 extremities, no focal deficit noted     Medications   Current Facility-Administered Medications   Medication Dose Route Frequency Provider Last Rate Last Admin    Continuing statin from home medication list OR statin order already placed during this visit   Does not apply DOES NOT GO TO Maurizio Cristina MD        dianeal PD LOW calcium-1.5% dex (calcium 2.5 mEq/L) 6,000 mL PERITONEAL DIALYSATE   Dialysis [facility] Supplied PD Jennifer Jameson MD        medication instruction   Does not apply Continuous PRN Maurizio Glover MD        Patient is already receiving anticoagulation with heparin, enoxaparin (LOVENOX), warfarin (COUMADIN)  or other anticoagulant medication   Does not apply Continuous PRN Maurizio Glover MD        Reason ACE/ARB/ARNI order not selected   Other DOES NOT GO TO Maurizio Cristina MD        reason aspirin not prescribed (intentional)   Other DOES NOT GO TO Maurizio Cristina MD        Reason beta blocker not prescribed   Does not apply DOES NOT GO TO Maurizio Cristina MD         Current Facility-Administered Medications   Medication Dose Route Frequency Provider Last Rate Last Admin    allopurinol (ZYLOPRIM) tablet 200 mg  200 mg Oral QPM Maurizio Glover MD   200 mg at 12/01/24 1932    atorvastatin (LIPITOR) tablet 40 mg  40 mg Oral At Bedtime Maurizio Glover MD   40 mg at 12/01/24 2204    calcitRIOL (ROCALTROL) capsule 0.25 mcg  0.25 mcg Oral At Bedtime Maurizio Glover MD   0.25 mcg at 12/01/24 2204    carvedilol (COREG) tablet 3.125 mg  3.125 mg Oral BID w/meals Leonor Haddad MD   3.125 mg at 12/02/24 0955    cinacalcet (SENSIPAR) tablet 60 mg  60 mg Oral Once per day on Monday Wednesday Friday Maurizio Glover MD   60 mg at 12/02/24 0953    clopidogrel (PLAVIX) tablet 75 mg  75 mg Oral QPM Belen  MD Maurizio   75 mg at 12/01/24 1932    insulin glargine (LANTUS PEN) injection 20 Units  20 Units Subcutaneous At Bedtime Maurizio Glover MD   20 Units at 12/01/24 2204    pantoprazole (PROTONIX) EC tablet 40 mg  40 mg Oral QAM AC Maurizio Glover MD   40 mg at 12/02/24 0953    sevelamer carbonate (RENVELA) tablet 800 mg  800 mg Oral TID w/meals Maurizio Glover MD   800 mg at 12/02/24 0953    sodium chloride (PF) 0.9% PF flush 3 mL  3 mL Intracatheter Q8H Tamy Guthrie PA-C   3 mL at 12/02/24 0957    sodium chloride 0.9% BOLUS 500 mL  500 mL Intravenous Once Leonor Haddad MD        warfarin ANTICOAGULANT (COUMADIN) tablet 6.5 mg  6.5 mg Oral ONCE at 18:00 Maurizio Glover MD        Warfarin Dose Required Daily - Pharmacist Managed  1 each Oral See Admin Instructions Dima Romo MD           Data   Recent Labs   Lab 12/02/24  0915 12/02/24  0538 12/02/24  0154 12/01/24  2122 12/01/24  0927 12/01/24  0541 11/30/24  0935 11/30/24  0545 11/29/24  1314 11/29/24  1126 11/28/24  1507 11/28/24  0535 11/27/24  1949 11/27/24  1035   WBC  --   --   --   --   --   --   --  5.9  --   --   --  5.9  --  7.7   HGB  --   --   --   --   --   --   --  10.2*  --   --   --  10.5*  --  11.4*   MCV  --   --   --   --   --   --   --  95  --   --   --  94  --  100   PLT  --   --   --   --   --   --   --  250  --   --   --  281  --  395   INR  --  1.92*  --   --   --  1.87*  --  1.64*  --   --    < >  --   --  1.55*   NA  --   --   --   --   --   --   --  131*  --  134*  --  130*  --  129*   POTASSIUM  --   --   --   --   --   --   --  3.9  --  4.4  --  4.1  --  4.9   CHLORIDE  --   --   --   --   --   --   --  96*  --  96*  --  94*  --  89*   CO2  --   --   --   --   --   --   --  25  --  26  --  24  --  26   BUN  --   --   --   --   --   --   --  37.0*  --  38.2*  --  40.5*  --  41.0*   CR  --   --   --   --   --   --   --  7.48*  --  7.66*  --  7.23*  --  7.58*   ANIONGAP  --   --   --   --   --   --   --  10  --  12  --  12  --   14   TERENCE  --   --   --   --   --   --   --  8.3*  --  8.4*  --  8.6*  --  9.2   GLC 86  --  131* 144*   < >  --    < > 162*   < > 80   < > 151*   < > 150*    < > = values in this interval not displayed.     Recent Labs   Lab 12/02/24  0915 12/02/24  0154 12/01/24  2122 12/01/24  1757 12/01/24  1310   GLC 86 131* 144* 130* 112*       Imaging:   No results found for this or any previous visit (from the past 24 hours).

## 2024-12-02 NOTE — PROGRESS NOTES
Care Management Follow Up    Length of Stay (days): 5    Expected Discharge Date: 12/03/2024     Concerns to be Addressed: discharge planning     Patient plan of care discussed at interdisciplinary rounds: Yes    Anticipated Discharge Disposition: Skilled Nursing Facility              Anticipated Discharge Services: None  Anticipated Discharge DME: None    Patient/family educated on Medicare website which has current facility and service quality ratings: no  Education Provided on the Discharge Plan: Yes  Patient/Family in Agreement with the Plan: yes    Referrals Placed by CM/SW:    Private pay costs discussed: Not applicable    Discussed  Partnership in Safe Discharge Planning  document with patient/family: No     Handoff Completed: No, handoff not indicated or clinically appropriate    Additional Information:  Patient accepted at [facility]. Writer arranged a tentative ride for patient to go back today from [phone]. Writer spoke with Dr. Haddad, patient not ready for discharge yet today. Writer rescheduled the ride for tomorrow with a service window of [phone].     Next Steps: Await medical clearance    KATELYN WILKINS  [facility]  INPATIENT CARE COORDINATION

## 2024-12-02 NOTE — CONSULTS
"[facility] Nurse Inpatient Assessment     Consulted for: \"right heel and left foot to the side of the pinky toe\"    Summary: POA stage 2 right heel, reabsorbed blood blister to left lateral foot, and open wound to left great toe, s/p partial amputation on 10/27, podiatry notified on 12/2 that this wound is now dehisced    Patient History (according to provider note(s):      \"Arnulfo Pritchett is a 64 year old male with history of coronary artery disease with multiple stents, hypertension, hyperlipidemia, chronic paroxysmal A-fib  (s/p ablation), ESRD on PD, secondary hyperparathyroidism, RCC  (s/p nephrectomy), DM type II, gout, GERD, TALI who was recently admitted to the hospital from 10/15 - 11/9 for critical limb ischemia of the left lower extremity and ischemic left great toe wound (see details of surgery below ), now is being admitted on 11/27/2024 with generalized weakness.\"       \"#recent admission from 10/15 - 11/9 with Critical limb ischemia of the left lower extremity.  # S/p left femoral endarterectomy with bovine pericardial patch angioplasty; left distal common femoral artery/proximal superficial femoral artery to tibioperoneal trunk bypass; and temporary closure of left groin wound with wound VAC 10/25/2024.  # S/p ligation of side branches of the left great saphenous vein and temporary closure of right groin with application of wound VAC 10/27/2024.  Ischemic left great toe wound - s/p amputation of left great toe 10/27/2024.  Left groin wound infection.  H/o acute RLE arterial occlusion s/p SFA popliteal balloon angioplasty and stenting (5/2024)\"    Assessment:      Areas visualized during today's visit: Focused:    Wound location: Left great toe, s/p partial amputation        After cleansing    Last photo: 12/2  Wound due to: Surgical Wound  Wound history/plan of care: S/P partial amputation of left great toe. Scab was lifting on the medial side and was easily removed with " thorough cleansing of the wound. Scab remains well adhered to the lateral side of the wound. Podiatry consult requested and Dr. Joseph was notified of changes to this wound. She stated that Dr. Salas is on call and will see the patient tomorrow.    Wound base: 50% Granulation tissue, Moist, and Pink, 50% Black and Eschar vs thick scab     Palpation of the wound bed: boggy      Drainage: small     Description of drainage: serosanguinous     Measurements (length x width x depth, in cm): 1  x 3.2  x  0.3 cm      Tunneling: N/A     Undermining: N/A  Periwound skin: Intact and Dry/scaly      Color: normal and consistent with surrounding tissue      Temperature: normal   Odor: none  Pain: denies  and no grimacing or signs of discomfort, none  Pain interventions prior to dressing change: patient tolerated well, soaking, and slow and gentle cares   Treatment goal: Heal , Infection control/prevention, and Protection  STATUS: initial assessment  Supplies ordered: gathered, at bedside, supplies stored on unit, discussed with RN, and discussed with patient     Wound location: Left lateral foot      Last photo: 12/2  Wound due to: Friction and Shear  Wound history/plan of care: Blood blister that is now reabsorbed, unknown cause  Wound base: 100%  thick, dark scab     Palpation of the wound bed: normal      Drainage: none     Description of drainage: none     Measurements (length x width x depth, in cm): 1.5  x 1.6 cm      Tunneling: N/A     Undermining: N/A  Periwound skin: Intact and Dry/scaly      Color: normal and consistent with surrounding tissue      Temperature: normal   Odor: none  Pain: denies  and no grimacing or signs of discomfort, none  Pain interventions prior to dressing change: patient tolerated well and slow and gentle cares   Treatment goal: Heal , Infection control/prevention, and Protection  STATUS: initial assessment  Supplies ordered: gathered, at bedside, supplies stored on unit, discussed with RN, and  discussed with patient     Pressure Injury Location: Right heel      Last photo: 12/2  Wound type: Pressure Injury     Pressure Injury Stage: 2, present on admission        Wound history/plan of care:   Intact serous blister, mostly reabsorbed with only a small amount of fluid remaining    Wound base: 100% Moist, Pink, and Smooth      Palpation of the wound bed: normal      Drainage: scant     Description of drainage: serous     Measurements (length x width x depth, in cm) 3.2  x 2.4 cm      Tunneling N/A     Undermining N/A  Periwound skin: Intact      Color: normal and consistent with surrounding tissue      Temperature: normal   Odor: none  Pain: denies  and no grimacing or signs of discomfort, none  Pain intervention prior to dressing change: patient tolerated well and slow and gentle cares   Treatment goal: Heal , Infection control/prevention, and Protection  STATUS: initial assessment  Supplies ordered: gathered, at bedside, supplies stored on unit, discussed with RN, and discussed with patient        Treatment Plan:     Left great toe and left lateral foot wound(s): Daily   Cleanse wounds with Vashe wound cleanser and pat dry with gauze   Paint with betadine and allow to dry  Cover with Polymem foam or other dry dressing    Right heel wounds: Every 3 days  Cleanse wounds with Vashe wound cleanser and pat dry with gauze   Cover with 4x4 Mepilex border  Float heels     Orders: Written    RECOMMEND PRIMARY TEAM ORDER: Podiatry consult  Education provided: plan of care, wound progress, Infection prevention , and Off-loading pressure  Discussed plan of care with: Patient, Family, and Physician  WOC nurse follow-up plan: weekly  Notify WOC if wound(s) deteriorate.  Nursing to notify the Provider(s) and re-consult the WOC Nurse if new skin concern.    DATA:     Current support surface: Standard  Standard gel mattress (Isoflex)  Containment of urine/stool: Continent of bowel and on peritoneal dialysis  BMI: Body mass  index is 23.16 kg/m .   Active diet order: Orders Placed This Encounter      Renal Diet (dialysis)       Output: I/O last 3 completed shifts:  In: 2480 [P.O.:1480; IV Piggyback:1000]  Out: 1195 [Urine:1195]     Labs:   Recent Labs   Lab 12/02/24  0538 12/01/24  0541 11/30/24  0545   HGB  --   --  10.2*   INR 1.92*   < > 1.64*   WBC  --   --  5.9    < > = values in this interval not displayed.     Pressure injury risk assessment:   Sensory Perception: 3-->slightly limited  Moisture: 4-->rarely moist  Activity: 3-->walks occasionally  Mobility: 3-->slightly limited  Nutrition: 3-->adequate  Friction and Shear: 3-->no apparent problem  Matias Score: 19    Dominique Butterfield RN CWCN  Pager no longer is use, please contact through [employer] group: WOC Nurse [place]

## 2024-12-02 NOTE — CARE PLAN
OT: Order rec'd. Patient admitted from TCU and PT has evaluated, rec return to TCU. Discharge was canceled for today and set for tomorrow. Patient is working with PT while here. Spoke with GEENA who reports patient has a bed hold. OT eval not necessary here. Deferring to TCU at this time. Order complete.

## 2024-12-02 NOTE — PLAN OF CARE
[id number]  Pt aox4, A1gbw, RA and full code. Tele ST. Pt denies CP and SOB. Pt on PD overnight. Wound on L thigh, L groin and R/L heels. Pt refused repo overnight. Ep of emesis overnight, prn zofran given. Refused am weight.   Plan: Will need placement at discharge.

## 2024-12-02 NOTE — TELEPHONE ENCOUNTER
Per 11/27/24 visit with KEITH Trejo CNP, pt needs the following:     US Bilateral VLADIMIR with TBI at rest, US LLE arterial prior to visit with Dr. Gray  In clinic appt with Dr. Gray to discuss results  Please schedule this in approximately 4 weeks    Routing to scheduling to contact patient to coordinate above.    Appt note in order comments.    Calli Moser RN  [facility]

## 2024-12-02 NOTE — PROGRESS NOTES
Reason for Admission: Critical limp ischemia of LLE s/p L femoral endarterectomy with angioplasty.   Cognitive/Mentation: A/Ox 4  VS: Stable RA.   Tele: ST.  /GI: Continent urinal.   Pulmonary: LS Clear RA.  Pain: Leg discomfort.     Drains/Lines: PIV SL  Skin: L.groin wound, and R.heel, Mid thigh to calf staples, dry skin overall.  Activity: Assist x 1 with GBW.  Diet: Renal with thin liquids. Takes pills whole with water.     Discharge: Pend    Aggression Stoplight Tool: green    End of shift summary: On PD dialysis.

## 2024-12-03 ENCOUNTER — MEDICAL CORRESPONDENCE (OUTPATIENT)
Dept: HEALTH INFORMATION MANAGEMENT | Facility: CLINIC | Age: 64
End: 2024-12-03

## 2024-12-03 ENCOUNTER — TELEPHONE (OUTPATIENT)
Dept: WOUND CARE | Facility: CLINIC | Age: 64
End: 2024-12-03
Payer: COMMERCIAL

## 2024-12-03 ENCOUNTER — APPOINTMENT (OUTPATIENT)
Dept: PHYSICAL THERAPY | Facility: CLINIC | Age: 64
DRG: 280 | End: 2024-12-03
Payer: MEDICARE

## 2024-12-03 ENCOUNTER — DOCUMENTATION ONLY (OUTPATIENT)
Dept: ANTICOAGULATION | Facility: CLINIC | Age: 64
End: 2024-12-03
Payer: COMMERCIAL

## 2024-12-03 VITALS
OXYGEN SATURATION: 95 % | WEIGHT: 176.37 LBS | HEART RATE: 95 BPM | DIASTOLIC BLOOD PRESSURE: 95 MMHG | BODY MASS INDEX: 24.15 KG/M2 | SYSTOLIC BLOOD PRESSURE: 152 MMHG | TEMPERATURE: 99 F | RESPIRATION RATE: 18 BRPM

## 2024-12-03 DIAGNOSIS — Z86.73 H/O: STROKE: ICD-10-CM

## 2024-12-03 DIAGNOSIS — I63.9 CVA (CEREBRAL VASCULAR ACCIDENT) (H): ICD-10-CM

## 2024-12-03 DIAGNOSIS — Z79.01 LONG TERM CURRENT USE OF ANTICOAGULANT THERAPY: ICD-10-CM

## 2024-12-03 DIAGNOSIS — I48.91 ATRIAL FIBRILLATION, UNSPECIFIED TYPE (H): Primary | ICD-10-CM

## 2024-12-03 LAB
ANION GAP SERPL CALCULATED.3IONS-SCNC: 10 MMOL/L (ref 7–15)
BUN SERPL-MCNC: 31.4 MG/DL (ref 8–23)
CALCIUM SERPL-MCNC: 8.4 MG/DL (ref 8.8–10.4)
CHLORIDE SERPL-SCNC: 99 MMOL/L (ref 98–107)
CREAT SERPL-MCNC: 7.39 MG/DL (ref 0.67–1.17)
EGFRCR SERPLBLD CKD-EPI 2021: 8 ML/MIN/1.73M2
ERYTHROCYTE [DISTWIDTH] IN BLOOD BY AUTOMATED COUNT: 18.9 % (ref 10–15)
GLUCOSE BLDC GLUCOMTR-MCNC: 61 MG/DL (ref 70–99)
GLUCOSE BLDC GLUCOMTR-MCNC: 78 MG/DL (ref 70–99)
GLUCOSE BLDC GLUCOMTR-MCNC: 94 MG/DL (ref 70–99)
GLUCOSE SERPL-MCNC: 81 MG/DL (ref 70–99)
HBV SURFACE AB SERPL IA-ACNC: <3.5 M[IU]/ML
HBV SURFACE AB SERPL IA-ACNC: NONREACTIVE M[IU]/ML
HBV SURFACE AG SERPL QL IA: NONREACTIVE
HCO3 SERPL-SCNC: 26 MMOL/L (ref 22–29)
HCT VFR BLD AUTO: 29.7 % (ref 40–53)
HGB BLD-MCNC: 9.8 G/DL (ref 13.3–17.7)
INR PPP: 2.39 (ref 0.85–1.15)
MCH RBC QN AUTO: 31 PG (ref 26.5–33)
MCHC RBC AUTO-ENTMCNC: 33 G/DL (ref 31.5–36.5)
MCV RBC AUTO: 94 FL (ref 78–100)
PLATELET # BLD AUTO: 232 10E3/UL (ref 150–450)
POTASSIUM SERPL-SCNC: 3.5 MMOL/L (ref 3.4–5.3)
RBC # BLD AUTO: 3.16 10E6/UL (ref 4.4–5.9)
SODIUM SERPL-SCNC: 135 MMOL/L (ref 135–145)
WBC # BLD AUTO: 6.6 10E3/UL (ref 4–11)

## 2024-12-03 PROCEDURE — 99221 1ST HOSP IP/OBS SF/LOW 40: CPT | Performed by: PODIATRIST

## 2024-12-03 PROCEDURE — 99239 HOSP IP/OBS DSCHRG MGMT >30: CPT | Performed by: INTERNAL MEDICINE

## 2024-12-03 PROCEDURE — 85610 PROTHROMBIN TIME: CPT | Performed by: INTERNAL MEDICINE

## 2024-12-03 PROCEDURE — 85014 HEMATOCRIT: CPT | Performed by: INTERNAL MEDICINE

## 2024-12-03 PROCEDURE — 36415 COLL VENOUS BLD VENIPUNCTURE: CPT | Performed by: INTERNAL MEDICINE

## 2024-12-03 PROCEDURE — 86706 HEP B SURFACE ANTIBODY: CPT | Performed by: INTERNAL MEDICINE

## 2024-12-03 PROCEDURE — 250N000013 HC RX MED GY IP 250 OP 250 PS 637: Performed by: INTERNAL MEDICINE

## 2024-12-03 PROCEDURE — 85048 AUTOMATED LEUKOCYTE COUNT: CPT | Performed by: INTERNAL MEDICINE

## 2024-12-03 PROCEDURE — 97530 THERAPEUTIC ACTIVITIES: CPT | Mod: GP

## 2024-12-03 PROCEDURE — 80048 BASIC METABOLIC PNL TOTAL CA: CPT | Performed by: INTERNAL MEDICINE

## 2024-12-03 PROCEDURE — 87340 HEPATITIS B SURFACE AG IA: CPT | Performed by: INTERNAL MEDICINE

## 2024-12-03 RX ORDER — SACUBITRIL AND VALSARTAN 49; 51 MG/1; MG/1
0.5 TABLET, FILM COATED ORAL 2 TIMES DAILY
Status: ON HOLD | DISCHARGE
Start: 2024-12-03 | End: 2024-12-06

## 2024-12-03 RX ORDER — TORSEMIDE 100 MG/1
100 TABLET ORAL EVERY MORNING
Status: ON HOLD | DISCHARGE
Start: 2024-12-03 | End: 2024-12-06

## 2024-12-03 RX ORDER — OXYCODONE HYDROCHLORIDE 5 MG/1
5 TABLET ORAL EVERY 6 HOURS PRN
Qty: 10 TABLET | Refills: 0 | Status: SHIPPED | OUTPATIENT
Start: 2024-12-03

## 2024-12-03 RX ORDER — CARVEDILOL 3.12 MG/1
3.12 TABLET ORAL 2 TIMES DAILY WITH MEALS
Status: ON HOLD | DISCHARGE
Start: 2024-12-03 | End: 2024-12-06

## 2024-12-03 RX ORDER — WARFARIN SODIUM 5 MG/1
5 TABLET ORAL
Status: DISCONTINUED | OUTPATIENT
Start: 2024-12-03 | End: 2024-12-03 | Stop reason: HOSPADM

## 2024-12-03 RX ORDER — MIDODRINE HYDROCHLORIDE 2.5 MG/1
2.5 TABLET ORAL
DISCHARGE
Start: 2024-12-03

## 2024-12-03 RX ADMIN — PANTOPRAZOLE SODIUM 40 MG: 40 TABLET, DELAYED RELEASE ORAL at 08:33

## 2024-12-03 RX ADMIN — CARVEDILOL 3.12 MG: 3.12 TABLET, FILM COATED ORAL at 08:33

## 2024-12-03 RX ADMIN — SEVELAMER CARBONATE 800 MG: 800 TABLET, FILM COATED ORAL at 08:33

## 2024-12-03 RX ADMIN — SEVELAMER CARBONATE 800 MG: 800 TABLET, FILM COATED ORAL at 12:49

## 2024-12-03 ASSESSMENT — ACTIVITIES OF DAILY LIVING (ADL)
ADLS_ACUITY_SCORE: 42
ADLS_ACUITY_SCORE: 41
ADLS_ACUITY_SCORE: 42

## 2024-12-03 NOTE — PROGRESS NOTES
ANTICOAGULATION  MANAGEMENT: Discharge Review    Arnulfo Pritchett chart reviewed for anticoagulation continuity of care    Hospital Admission on 11/27-12/3 NSTEMI (non-ST elevated myocardial infarction) (H)     Discharge disposition: TCU    Results:    Recent labs: (last 7 days)     11/27/24  1035 11/27/24  1800 11/28/24  1507 11/29/24  0607 11/30/24  0545 12/01/24  0541 12/02/24  0538 12/03/24  0536   INR 1.55*  --  1.76* 1.75* 1.64* 1.87* 1.92* 2.39*   AAUFH  --  0.10  --   --   --   --   --   --      Anticoagulation inpatient management:     Will update with tcu dosing    Anticoagulation discharge instructions:     Warfarin dosing:  Per TCU   Bridging: No   INR goal change: No      Medication changes affecting anticoagulation: No    Additional factors affecting anticoagulation: No     PLAN     Recommend to check INR on upon discharge of TCU    Patient not contacted    Anticoagulation Calendar updated    Evelia Chua RN  12/3/2024  Anticoagulation Clinic  [facility] for routing messages: merlin LAURA  [facility] patient phone line: [phone]

## 2024-12-03 NOTE — TELEPHONE ENCOUNTER
There is an inpatient consult in the chart for podiatry but the patient could benefit from being seen outpatient.       Does the patient have an open and draining wound? Yes    Please schedule with Alyce Salas D.P.M. or Jarvis Justice D.P.M. at [facility] [place] for next available appointment.    **For all providers, please schedule a follow up 4 weeks after initial appointment. (2-3 weeks for Dr. Salas and Dr. Justice)**  --If unable to schedule within 2-3 weeks then please place on cancellation list--    Is the patient able to make their own medical decisions? Yes    Is patient a ROXY lift? PLEASE INQUIRE WHEN MAKING THE APPOINTMENT AND PUT IN APPOINTMENT NOTES    Routing to  Wound Healing Scheduling.

## 2024-12-03 NOTE — TELEPHONE ENCOUNTER
[facility] called regarding scheduling the patient for a consult with Dr Salas, states there is a referral consult in the patients chart for podiatry.    [facility] [phone]

## 2024-12-03 NOTE — PLAN OF CARE
Physical Therapy Discharge Summary    Reason for therapy discharge:    Discharged to transitional care facility.    Progress towards therapy goal(s). See goals on Care Plan in [facility] electronic health record for goal details.  Goals partially met.  Barriers to achieving goals:   discharge from facility.    Therapy recommendation(s):    Continued therapy is recommended.  Rationale/Recommendations:  To further increase independence with mobility.

## 2024-12-03 NOTE — TELEPHONE ENCOUNTER
Spoke with patient's nurse Bean who states that the patient will be discharging to a TCU. Due to length of wait time for the next available appointment, a January 10th and February 7th appointment were made on behalf of the patient with Bean's assistance. He will inform the patient of the upcoming appointments and have him reach out if the dates and times are not convenient for him.

## 2024-12-03 NOTE — PROGRESS NOTES
Care Management Discharge Note    Discharge Date: 12/03/2024       Discharge Disposition: Skilled Nursing Facility    Discharge Services: None    Discharge DME: None    Discharge Transportation: car, drives self, family or friend will provide    Private pay costs discussed: transportation costs    Does the patient's insurance plan have a 3 day qualifying hospital stay waiver?  No    PAS Confirmation Code: [id number]  Patient/family educated on Medicare website which has current facility and service quality ratings: no    Education Provided on the Discharge Plan: Yes  Persons Notified of Discharge Plans: Patient, facility, bedside nurse, [facility]  Patient/Family in Agreement with the Plan: yes    Handoff Referral Completed: No, handoff not indicated or clinically appropriate    Additional Information:  Patient able to discharge to [facility] today. Writer called the facility and confirmed they are able to accept. W/C ride was arranged for [id number]. Orders sent to the facility.     KATELYN WILKINS  [facility]  INPATIENT CARE COORDINATION

## 2024-12-03 NOTE — PLAN OF CARE
A&OX4, RA, Peritoneal dialysis overnight, VSS,  wound on the LLE open to air, right heel wound covered. Tele- SR with  PACs. Doppler present with doppler.   and 94. Up with assist of 1 with walker and be belt, using urinal.

## 2024-12-03 NOTE — DISCHARGE SUMMARY
[facility]    Discharge Summary  Hospitalist    Date of Admission:  11/27/2024  Date of Discharge:  12/3/2024  Discharging Provider: Leonor Haddad MD    Discharge Diagnoses   NSTEMI    History of Present Illness   Please review admission history and physical.    Hospital Course   Arnulfo Pritchett was admitted on 11/27/2024.  The following problems were addressed during his hospitalization:    Active Problems:    NSTEMI (non-ST elevated myocardial infarction) (H)  Arnulfo Pritchett is a 64 year old male with history of coronary artery disease with multiple stents, hypertension, hyperlipidemia, chronic paroxysmal A-fib  (s/p ablation), ESRD on PD, secondary hyperparathyroidism, RCC  (s/p nephrectomy), DM type II, gout, GERD, TALI who was recently admitted to the hospital from 10/15 - 11/9 for critical limb ischemia of the left lower extremity and ischemic left great toe wound (see details of surgery below ), now is being admitted on 11/27/2024 with generalized weakness.        Hypotension on admission: Responded to 1 unit PRBC, 1 L normal saline   Lactic acidosis  Non-STEMI versus nonischemic myocardial injury due to significant hypotension (recent nuclear test with no ischemia ), admission troponin 564 and BNP of 18, 739    History ofcoronary artery disease with multiple stents (last in 2021)  - * Echo 6/2024 showed LVEF 20-25%, segmental wall motion globally hypokinetic.   * Nuc stress test 10/22 was abnormal but no evidence of ischemia; medium sized area of infarction in the entire inferolateral segment(s) of the left ventricle extending into basal inferior segment; small area of nontransmural infarction in the apical segment(s) of the left ventricle; TID absent; left ventricular ejection fraction at stress 26%.  Was seen by cardiology this admission and have signed off.  -Initial concern for cardiogenic shock, followed by emergent diagnostic coronary angiogram which showed stable moderate to  severe distal coronary artery disease.  No significant lesions in the left main or any proximal or ostial lesions.  -Patient has been started on IV dobutamine at 5 mg/h overnight titrated to 2.5 mg/h and has been discontinued on 11/29  -Patient is currently off of dobutamine IV but blood pressures are still an issue when orthostatic blood pressures are checked, patient also has had dizziness with changing position.  Cardiology recommended to restart carvedilol and Entresto half the dose and increase accordingly outpatient.  Nephrology was supportive of doing as needed midodrine, so far blood pressures are stable but patient is symptomatic.  Continue with as needed midodrine for now.  -12/2 Entresto is on hold, Coreg started at 3.125 twice daily dosage.  Monitor blood pressures, patient was given 500 cc normal saline bolus same day.  -Diuretic management according to nephrology.  -Will plan discharge back to rehab with as needed midodrine if blood pressures are on the lower end and patient is symptomatic.  I am hesitant to  scheduled midodrine due to concern of underlying coronary artery disease.        Chronic A-fib:   Rate controlled: Hold PTA carvedilol  Anticoagulation: PTA on Coumadin.  Admission INR of 5.55.  -Patient was initially started on IV heparin which has been stopped per cardiology recommendations,INR is stable today at 1.92.  -Warfarin management per pharmacy recommendations.     Recent admission from 10/15 - 11/9 with Critical limb ischemia of the left lower extremity.   S/p left femoral endarterectomy with bovine pericardial patch angioplasty; left distal common femoral artery/proximal superficial femoral artery to tibioperoneal trunk bypass; and temporary closure of left groin wound with wound VAC 10/25/2024.  S/p ligation of side branches of the left great saphenous vein and temporary closure of right groin with application of wound VAC 10/27/2024.  Ischemic left great toe wound - s/p amputation  of left great toe 10/27/2024.  Left groin wound infection.  H/o acute RLE arterial occlusion s/p SFA popliteal balloon angioplasty and stenting (5/2024).  -Appreciate vascular surgery review in ER.  No concerns for active infection  -Continue cares per vascular surgery  -Every 4 Doppler checks to bilateral lower extremity.  -Dressing changes to the left groin and leg incisions along with toe wound and continue aspirin 81 mg, Plavix 75 mg daily     ESKD secondary to diabetes mellitus nephropathy on PD  Secondary hyperparathyroidism  -Nephrology consult  -Continue ongoing peritoneal dialysis.  Next episode tonight  -Continue diuretic management according to nephrology.     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  (PTA Glargine 35U at bedtime)  -Continue PTA glargine at decreased dose and sliding scale insulin     GERD: Continue PPI      TALI : Intolerant to CPAP, follow-up in sleep clinic as outpatient     Gout: Continue allopurinol       Leonor Haddad MD    Significant Results and Procedures       Pending Results   These results will be followed up by nephrology   Unresulted Labs Ordered in the Past 30 Days of this Admission       Date and Time Order Name Status Description    12/3/2024 10:37 AM Hepatitis B Surface Antibody In process     12/3/2024 10:37 AM Hepatitis B surface antigen In process             Code Status   Full Code       Primary Care Physician   Chai Griffin    Physical Exam   Temp: 99  F (37.2  C) Temp src: Oral BP: (!) 152/95 Pulse: 95   Resp: 18 SpO2: 95 % O2 Device: None (Room air)    Vitals:    11/30/24 0608 12/01/24 0444 12/03/24 0500   Weight: 78.2 kg (172 lb 6.4 oz) 76.7 kg (169 lb 1.5 oz) 80 kg (176 lb 5.9 oz)     Vital Signs with Ranges  Temp:  [97.9  F (36.6  C)-99.3  F (37.4  C)] 99  F (37.2  C)  Pulse:  [] 95  Resp:  [18-20] 18  BP: (112-152)/(83-98) 152/95  SpO2:  [95 %-97 %] 95 %  I/O last 3 completed shifts:  In: 1340 [P.O.:840; IV Piggyback:500]  Out: 815  [Urine:815]    The patient was examined on the day of discharge.    Discharge Disposition   Discharged to nursing home  Condition at discharge: Stable    Consultations This Hospital Stay   PHARMACY TO DOSE VANCO  PHARMACY IP CONSULT  CARDIOLOGY IP CONSULT  NEPHROLOGY IP CONSULT  VASCULAR SURGERY IP CONSULT  CARDIAC REHAB IP CONSULT  PHYSICAL THERAPY ADULT IP CONSULT  PHARMACY IP CONSULT  CARE MANAGEMENT / SOCIAL WORK IP CONSULT  CORE CLINIC EVALUATION IP CONSULT  PHARMACY TO DOSE WARFARIN  WOUND OSTOMY CONTINENCE NURSE  IP CONSULT  OCCUPATIONAL THERAPY ADULT IP CONSULT  PODIATRY IP CONSULT  PHYSICAL THERAPY ADULT IP CONSULT  OCCUPATIONAL THERAPY ADULT IP CONSULT  SMOKING CESSATION PROGRAM IP CONSULT  SMOKING CESSATION PROGRAM IP CONSULT    Time Spent on this Encounter   I, Leonor Haddad MD, personally saw the patient today and spent greater than 30 minutes discharging this patient.    Discharge Orders      Med Therapy Management Referral      Primary Care - Care Coordination Referral      Medication Instructions - Anticoagulants    Do NOT stop your aspirin or platelet inhibitor unless directed by your Cardiologist.  These medications help to prevent platelets in your blood from sticking together and forming a clot.  Examples of these medications are:  Ticagrelor (Brilinta), Clopidigrel (Plavix), Prasugrel (Effient)     When to call - Contact the Heart Clinic    You may experience symptoms that require follow-up before your scheduled appointment. Contact the Heart Clinic if you develop: Fever over 100.4o Fahrenheit, that lasts more than one day; Redness, heat, or pus at the puncture site; Change in color or temperature in your hand or arm.     When to call - Reasons to Call 911    If your wrist puncture site starts bleeding after discharge, sit down and apply firm pressure with your thumb against the puncture site and fingers against the back of the wrist for 10 minutes. If the bleeding stops, continue to rest,  keeping your wrist still for 2 hours. Notify your doctor as soon as possible.  IF BLEEDING DOES NOT STOP OR THERE IS A LARGER AMOUNT OF BLEEDING OR SPURTING CALL 9-1-1 immediately.DO NOT drive yourself to the hospital.     Precautions - Lifting    DO NOT lift more than 5 pounds with affected arm for 48 hours     Precautions - Household Activities    Avoid excessive bending or movement of your wrist for 72 hours.  Do not subject hand/arm to any forceful movements for 24 hours, such as supporting weight when rising from a chair or bed.     Remove the band-aid on the puncture site after 24 hours and leave open to air. If minor oozing, you may apply a band-aid and remove after 12 hours.     Precautions - Active Sports Activities    DO NOT engage in vigorous exercise using your affected arm for 3 days after discharge.  This includes golf, tennis or swimming.     Precautions - Operating yard equipment or vehicles    Do not operate a chainsaw, lawnmower, motorcycle, or all-terrain vehicle for 48 hours after the procedure.     Precautions - Elective Dental Work    NO elective dental work for 6 weeks after receiving a stent.     Comfort and Pain Management - Bruising after Surgery    Expect mild tingling of hand and tenderness at the wrist puncture site for up to 3 days. You may take Tylenol or a pain medicine recommended by your doctor.     Activity - Cardiac Rehab    You are encouraged to enroll in an Outpatient Cardiac Rehab program after discharge from the hospital.  Our Cardiac Rehab staff may visit briefly with you while you're in the hospital.  If they miss you, someone will contact you after you are home.     Return to Driving    Driving is NOT permitted for 24 hours after surgery     Return to work    You may return to work after 72 hours if you are feeling well and your job does not involve heavy lifting.     Shower / Bathing    You may shower on the day after your procedure.  DO NOT soak of wrist with the puncture  site in water for 3 days to prevent infection. DO NOT take a tub bath or wash dishes for 3 days after the procedure     Dressing Removal    Remove the band-aid on the puncture site after 24 hours and leave open to air. If minor oozing, you may apply a band-aid and remove after 12 hours     Follow-up and recommended labs and tests     *PLEASE CALL [phone] TO SCHEDULE*  Follow up in 2 weeks  --------------------------------------------------------------------------  [facility]  [address]  [place] [address] [place] [address]  [phone]  [phone]     [address]  [place] [address] [place] [address]  [phone]  [phone]    Dr. Haley Joseph - [place]  Dr. Alyce Salas [facility]  Dr. Jarvis Esquivel - [facility] / [place]     Activity    Your activity upon discharge: Weightbearing as tolerated on the left foot     Wound care and dressings    Instructions to care for your wound at home:     Keep the left great toe clean.  Apply a small amount of betadine and a dry gauze dressing.  Change every other day.     General info for SNF    Length of Stay Estimate: Short Term Care: Estimated # of Days <30  Condition at Discharge: Improving  Level of care:skilled   Rehabilitation Potential: Excellent  Admission H&P remains valid and up-to-date: Yes  Recent Chemotherapy: N/A  Use Nursing Home Standing Orders: Yes     Mantoux instructions    Give two-step Mantoux (PPD) Per Facility Policy Yes     Follow Up and recommended labs and tests    Follow up with [facility] physician.  The following labs/tests are recommended: cbc and basic metabolic panel in 4-5 days .     Reason for your hospital stay    Shortness of breath ,chest pain .     Glucose monitor nursing POCT    Before meals and at bedtime      Daily weights    Call Provider for weight gain of more than 2 pounds per day or 5 pounds per week.     Wound care (specify)    Follow podiatry input.     Activity - Up with nursing assistance     Physical Therapy Adult Consult    Evaluate and treat as clinically indicated.    Reason:  deconditioning     Occupational Therapy Adult Consult    Evaluate and treat as clinically indicated.    Reason:  deconditioning     Fall precautions     Diet    Follow this diet upon discharge: Current Diet:Orders Placed This Encounter      Renal Diet (dialysis)     Discharge Medications   Current Discharge Medication List        START taking these medications    Details   midodrine (PROAMATINE) 2.5 MG tablet Take 1 tablet (2.5 mg) by mouth once as needed (hypotension/dizziness post PD in AM during the day).    Associated Diagnoses: Diabetes mellitus due to underlying condition, controlled, with chronic kidney disease on chronic dialysis, with long-term current use of insulin (H)           CONTINUE these medications which have CHANGED    Details   carvedilol (COREG) 3.125 MG tablet Take 1 tablet (3.125 mg) by mouth 2 times daily (with meals).    Associated Diagnoses: ACS (acute coronary syndrome) (H)      insulin glargine (LANTUS PEN) 100 UNIT/ML pen Inject 20 Units subcutaneously at bedtime.    Comments: If Lantus is not covered by insurance, may substitute Basaglar or Semglee or other insulin glargine product per insurance preference at same dose and frequency.    Associated Diagnoses: ACS (acute coronary syndrome) (H)      oxyCODONE (ROXICODONE) 5 MG tablet Take 1 tablet (5 mg) by mouth every 6 hours as needed for severe pain.  Qty: 10 tablet, Refills: 0    Associated Diagnoses: ACS (acute coronary syndrome) (H); Ischemic ulcer of toe, left, with unspecified severity (H)      sacubitril-valsartan (ENTRESTO) 49-51 MG per tablet Take 0.5 tablets by mouth 2 times daily.    Associated Diagnoses: ACS (acute coronary syndrome) (H)       torsemide (DEMADEX) 100 MG tablet Take 1 tablet (100 mg) by mouth every morning.    Comments: Hold ,don't start until cleared by cardiology .  Associated Diagnoses: ACS (acute coronary syndrome) (H)           CONTINUE these medications which have NOT CHANGED    Details   acetaminophen (TYLENOL) 500 MG tablet Take 1,000 mg by mouth every 8 hours as needed.      allopurinol (ZYLOPRIM) 100 MG tablet Take 200 mg by mouth every evening      atorvastatin (LIPITOR) 40 MG tablet Take 40 mg by mouth at bedtime.      B Complex-C-Folic Acid (DIALYVITE) TABS Take 1 tablet by mouth daily.      calcitRIOL (ROCALTROL) 0.25 MCG capsule Take 0.25 mcg by mouth at bedtime.      cinacalcet (SENSIPAR) 60 MG tablet Take 60 mg by mouth. Take 1 tablet (60 mg) three times a week: Monday, Wednesday, and Friday nights      clopidogrel (PLAVIX) 75 MG tablet Take 75 mg by mouth every evening.      gentamicin (GARAMYCIN) 0.1 % external cream Apply topically daily as needed. Apply topically on peritoneal access site to prevent infections      glucose 40 % (400 mg/mL) gel Take 15-30 g by mouth every 15 minutes as needed for low blood sugar.    Associated Diagnoses: Type 2 diabetes mellitus with chronic kidney disease, with long-term current use of insulin, unspecified CKD stage (H)      melatonin 5 MG tablet Take 5 mg by mouth at bedtime      omeprazole (PRILOSEC) 20 MG DR capsule Take 20 mg by mouth daily.      polyethylene glycol (MIRALAX) 17 g packet Take 1 packet by mouth daily.      senna-docusate (SENOKOT-S/PERICOLACE) 8.6-50 MG tablet Take 1 tablet by mouth daily as needed for constipation.      SEVELAMER CARBONATE PO Take 800 mg by mouth 3 times daily (with meals)      !! warfarin ANTICOAGULANT (COUMADIN) 3 MG tablet Take 3 mg by mouth five times a week. Take 3 mg Mon, Thu, Fri, Sat and Sun      !! warfarin ANTICOAGULANT (COUMADIN) 4 MG tablet Take 4 mg by mouth twice a week. Takes 4 mg Tue and Wed       !! - Potential duplicate  medications found. Please discuss with provider.        Allergies   No Known Allergies  Data   Most Recent 3 CBC's:  Recent Labs   Lab Test 12/03/24  0536 11/30/24  0545 11/28/24  0535   WBC 6.6 5.9 5.9   HGB 9.8* 10.2* 10.5*   MCV 94 95 94    250 281      Most Recent 3 BMP's:  Recent Labs   Lab Test 12/03/24  1152 12/03/24  0729 12/03/24  0536 11/30/24  0935 11/30/24  0545 11/29/24  1314 11/29/24  1126   NA  --   --  135  --  131*  --  134*   POTASSIUM  --   --  3.5  --  3.9  --  4.4   CHLORIDE  --   --  99  --  96*  --  96*   CO2  --   --  26  --  25  --  26   BUN  --   --  31.4*  --  37.0*  --  38.2*   CR  --   --  7.39*  --  7.48*  --  7.66*   ANIONGAP  --   --  10  --  10  --  12   TERENCE  --   --  8.4*  --  8.3*  --  8.4*   GLC 78 61* 81   < > 162*   < > 80    < > = values in this interval not displayed.     Most Recent 2 LFT's:No lab results found.  Most Recent INR's and Anticoagulation Dosing History:  Anticoagulation Dose History  More data exists         Latest Ref Rng & Units 11/27/2024 11/28/2024 11/29/2024 11/30/2024 12/1/2024 12/2/2024 12/3/2024   Recent Dosing and Labs   warfarin ANTICOAGULANT (COUMADIN) 1 MG tablet - - - - - - 6.5 mg, $Given -   warfarin ANTICOAGULANT (COUMADIN) 3 MG tablet - - - 6 mg, $Given 6 mg, $Given 6 mg, $Given - -   warfarin ANTICOAGULANT (COUMADIN) 5 MG tablet - - 5 mg, $Given - - - - -   INR 0.85 - 1.15 1.55  1.76  1.75  1.64  1.87  1.92  2.39       Details                 Most Recent 3 Troponin's:No lab results found.  Most Recent Cholesterol Panel:No lab results found.  Most Recent 6 Bacteria Isolates From Any Culture (See EPIC Reports for Culture Details):No lab results found.  Most Recent TSH, T4 and A1c Labs:  Recent Labs   Lab Test 11/27/24  1035 10/16/24  0642   TSH 6.45*  --    T4 1.38  --    A1C  --  5.7*     Results for orders placed or performed during the hospital encounter of 11/27/24   XR Chest Port 1 View    Narrative    CHEST ONE VIEW PORTABLE   11/27/2024 10:48 AM     HISTORY: Weakness.    COMPARISON: None.      Impression    IMPRESSION: No acute cardiopulmonary disease.    EMERSON ROSENBERG MD         SYSTEM ID:  [id number]   US Lower Extremity Arterial Duplex Bilateral    Narrative    EXAM: US VLADIMIR DOPPLER NO EXERCISE, 1-2 LEVELS, BILAT, US LOWER EXTREMITY ARTERIAL DUPLEX BILATERAL RESTING ABIs  LOCATION: [facility]  DATE: 11/28/2024    INDICATION: non healing right toe wound  COMPARISON: Ultrasound lower extremity arterial Doppler left 10/27/2024, CTA abdomen/pelvis with runoff 10/17/2024  TECHNIQUE: Duplex imaging is performed utilizing gray-scale, two-dimensional images and color-flow imaging. Doppler waveform analysis and spectral Doppler imaging is also performed.    FINDINGS: Examination limited by heavy atherosclerotic arterial vascular calcifications as well as by groin bandages and skin staples throughout the leg from recent bypass procedure. Prior left fem-tibial/peroneal trunk bypass with greater saphenous   vein. Limited visualization of the distal stent / anastomosis region.    RIGHT LOWER EXTREMITY ARTERIAL ASSESSMENT (Velocity, AP dimension, Waveform):    Common femoral artery: 75 cm/s, 5.9 mm, triphasic  Profunda femoris artery: 45 cm/s, 4.3 mm, biphasic  SFA (proximal): 90 cm/s, 5.8 mm, triphasic  SFA (mid): 53 cm/s, 7.3 mm, triphasic  SFA (distal): 33 cm/s, 6.0 mm, monophasic  Popliteal artery (proximal): 41 cm/s, 4.3 mm, biphasic  Popliteal artery (distal): 97 cm/s, 2.3 mm, biphasic  Posterior tibial artery: 32 cm/s, 2.2 mm, monophasic  Anterior tibial artery: 29 cm/s, 2.0 mm, monophasic  Peroneal artery: 56 cm/s, 2.8 mm, monophasic  Dorsalis pedis artery: 65 cm/s, 1.5 mm, monophasic    LEFT LOWER EXTREMITY ARTERIAL ASSESSMENT (Velocity, AP dimension, Waveform)::    Common femoral artery: 48 cm/s, 8.3 mm, triphasic  Profunda femoris artery: 51 cm/s, 4.9 mm, biphasic  SFA (proximal): 26 cm/s, 6.0 mm, biphasic  SFA  (mid): 20 cm/s, 5.9 mm, monophasic  SFA (distal): occluded / not measured  Popliteal artery: occluded / not measured  Posterior tibial artery: 69 cm/s, 2.0 mm, monophasic  Anterior tibial artery: 69 cm/s, 2.4 mm, monophasic  Peroneal artery: 47 cm/s, 1.5 mm, monophasic  Dorsalis pedis artery: 86 cm/s, 1.9 mm, monophasic    LEFT FEM-TIBIAL/PERONEAL TRUNK W/ GSV ASSESSMENT (Velocity, AP dimension):    Inflow: 20 cm/s, 5.9 mm  Proximal Stent edge/Anastomosis: 50 cm/s, 2.9 mm  Stent (proximal): 60 cm/s, 4.3 mm  Stent (mid): 28 cm/s, 3.6 mm  Stent (distal): 63 cm/s, 2.2 mm  Distal Stent edge/Anastomosis: 36 cm/s, 2.0 mm    Resting ABIs are 0.30 on the right.    Resting ABIs are  0.46 on the left.      Impression    IMPRESSION:  1.  Examination limited by heavy atherosclerotic arterial vascular calcifications as well as by groin bandages and skin staples throughout the leg from recent bypass procedure. Prior left fem-tibial/peroneal trunk bypass with greater saphenous vein,   patent. Limited visualization of the distal stent / anastomosis region.  2.  Monophasic waveforms of the left posterior tibial artery, anterior tibial artery, peroneal artery, and dorsalis pedis artery.  3.  Monophasic waveforms of the right distal superficial femoral artery as well as runoff vessels below the right popliteal artery.  4.  Velocity and AP dimension measurements as per above.  5.  Resting ABIs: 0.30 on the right, 0.46 on the left.   US VLADIMIR Doppler No Exercise    Narrative    EXAM: US VLADIMIR DOPPLER NO EXERCISE, 1-2 LEVELS, BILAT, US LOWER EXTREMITY ARTERIAL DUPLEX BILATERAL RESTING ABIs  LOCATION: [facility]  DATE: 11/28/2024    INDICATION: non healing right toe wound  COMPARISON: Ultrasound lower extremity arterial Doppler left 10/27/2024, CTA abdomen/pelvis with runoff 10/17/2024  TECHNIQUE: Duplex imaging is performed utilizing gray-scale, two-dimensional images and color-flow imaging. Doppler waveform  analysis and spectral Doppler imaging is also performed.    FINDINGS: Examination limited by heavy atherosclerotic arterial vascular calcifications as well as by groin bandages and skin staples throughout the leg from recent bypass procedure. Prior left fem-tibial/peroneal trunk bypass with greater saphenous   vein. Limited visualization of the distal stent / anastomosis region.    RIGHT LOWER EXTREMITY ARTERIAL ASSESSMENT (Velocity, AP dimension, Waveform):    Common femoral artery: 75 cm/s, 5.9 mm, triphasic  Profunda femoris artery: 45 cm/s, 4.3 mm, biphasic  SFA (proximal): 90 cm/s, 5.8 mm, triphasic  SFA (mid): 53 cm/s, 7.3 mm, triphasic  SFA (distal): 33 cm/s, 6.0 mm, monophasic  Popliteal artery (proximal): 41 cm/s, 4.3 mm, biphasic  Popliteal artery (distal): 97 cm/s, 2.3 mm, biphasic  Posterior tibial artery: 32 cm/s, 2.2 mm, monophasic  Anterior tibial artery: 29 cm/s, 2.0 mm, monophasic  Peroneal artery: 56 cm/s, 2.8 mm, monophasic  Dorsalis pedis artery: 65 cm/s, 1.5 mm, monophasic    LEFT LOWER EXTREMITY ARTERIAL ASSESSMENT (Velocity, AP dimension, Waveform)::    Common femoral artery: 48 cm/s, 8.3 mm, triphasic  Profunda femoris artery: 51 cm/s, 4.9 mm, biphasic  SFA (proximal): 26 cm/s, 6.0 mm, biphasic  SFA (mid): 20 cm/s, 5.9 mm, monophasic  SFA (distal): occluded / not measured  Popliteal artery: occluded / not measured  Posterior tibial artery: 69 cm/s, 2.0 mm, monophasic  Anterior tibial artery: 69 cm/s, 2.4 mm, monophasic  Peroneal artery: 47 cm/s, 1.5 mm, monophasic  Dorsalis pedis artery: 86 cm/s, 1.9 mm, monophasic    LEFT FEM-TIBIAL/PERONEAL TRUNK W/ GSV ASSESSMENT (Velocity, AP dimension):    Inflow: 20 cm/s, 5.9 mm  Proximal Stent edge/Anastomosis: 50 cm/s, 2.9 mm  Stent (proximal): 60 cm/s, 4.3 mm  Stent (mid): 28 cm/s, 3.6 mm  Stent (distal): 63 cm/s, 2.2 mm  Distal Stent edge/Anastomosis: 36 cm/s, 2.0 mm    Resting ABIs are 0.30 on the right.    Resting ABIs are  0.46 on the left.       Impression    IMPRESSION:  1.  Examination limited by heavy atherosclerotic arterial vascular calcifications as well as by groin bandages and skin staples throughout the leg from recent bypass procedure. Prior left fem-tibial/peroneal trunk bypass with greater saphenous vein,   patent. Limited visualization of the distal stent / anastomosis region.  2.  Monophasic waveforms of the left posterior tibial artery, anterior tibial artery, peroneal artery, and dorsalis pedis artery.  3.  Monophasic waveforms of the right distal superficial femoral artery as well as runoff vessels below the right popliteal artery.  4.  Velocity and AP dimension measurements as per above.  5.  Resting ABIs: 0.30 on the right, 0.46 on the left.   Echocardiogram Limited     Value    Biplane LVEF 30%    Narrative    [id number]  [id number]  [id number]  [id number]^^[id number]     [facility]  Echocardiography Laboratory  [address] [place] [address]     Name: WILLA MITTAL  MRN: [id number]  : 1960  Study Date: 2024 12:16 PM  Age: 64 yrs  Gender: Male  Patient Location: [facility]  Reason For Study: CAD  Ordering Physician: SHRUTHI CORTES  Referring Physician: Chai Griffin  Performed By: Daniel Mi RDCS     BSA: 2.0 m2  Height: 73 in  Weight: 163 lb  HR: 90  BP: 118/80 mmHg  ______________________________________________________________________________  Procedure  Limited Portable Echo Adult. Definity (NDC #71964-679) given intravenously.  ______________________________________________________________________________  Interpretation Summary     Left ventricular systolic function is severely reduced.  Biplane LVEF is 30%.  There is severe global hypokinesia of the left ventricle.  The right ventricular systolic function is mildly reduced.  No hemodynamically significant valvular aortic stenosis.  The study was technically difficult. The study was technically limited. There  is no comparison study  available.  ______________________________________________________________________________  Left Ventricle  The left ventricle is normal in size. There is normal left ventricular wall  thickness. Left ventricular systolic function is severely reduced. Biplane  LVEF is 30%. Left ventricular diastolic function is indeterminate. There is  severe global hypokinesia of the left ventricle.     Right Ventricle  The right ventricular systolic function is mildly reduced.     Mitral Valve  There is moderate mitral annular calcification. The mitral valve leaflets are  mildly thickened. The mitral valve is not well visualized. There is trace  mitral regurgitation.     Tricuspid Valve  No tricuspid regurgitation. Right ventricular systolic pressure could not be  approximated due to inadequate tricuspid regurgitation. IVC diameter <2.1 cm  collapsing >50% with sniff suggests a normal RA pressure of 3 mmHg.     Aortic Valve  There is moderate trileaflet aortic sclerosis. There is trace aortic  regurgitation. No hemodynamically significant valvular aortic stenosis. The  mean AoV pressure gradient is 8.4 mmHg.     Pulmonic Valve  The pulmonic valve is not well visualized.  ______________________________________________________________________________  MMode/2D Measurements & Calculations  IVSd: 1.0 cm  LVIDd: 5.3 cm  LVIDs: 4.6 cm  LVPWd: 0.93 cm  FS: 13.5 %  LV mass(C)d: 195.0 grams  LV mass(C)dI: 98.9 grams/m2  Ao root diam: 3.5 cm  LA dimension: 3.7 cm  LA/Ao: 1.0  LVOT diam: 2.2 cm  LVOT area: 3.8 cm2  Ao root diam index Ht(cm/m): 1.9  Ao root diam index BSA (cm/m2): 1.8  EF Biplane: 29.7 %  RWT: 0.35     Doppler Measurements & Calculations  MV max P.9 mmHg  MV mean P.4 mmHg  MV V2 VTI: 27.6 cm  MVA(VTI): 2.7 cm2     Ao V2 max: 186.5 cm/sec  Ao max P.0 mmHg  Ao V2 mean: 137.7 cm/sec  Ao mean P.4 mmHg  Ao V2 VTI: 34.9 cm  DAVID(I,D): 2.2 cm2  DAVID(V,D): 2.3 cm2  LV V1 max P.2 mmHg  LV V1 max: 113.7 cm/sec  LV V1  VTI: 20.0 cm  SV(LVOT): 75.8 ml  SI(LVOT): 38.4 ml/m2  AV Saul Ratio (DI): 0.61  DAVID Index (cm2/m2): 1.1     ______________________________________________________________________________  Report approved by: Heidy Fernando 11/27/2024 01:13 PM         Cardiac Catheterization    Narrative    No culprit lesion to explain patient's current presentation  Stable two-vessel CAD  Severe stenosis of the ongoing distal LCx after OM2.  Relatively small   vessel (~ 2.5 mm vessel)  Moderate-severe focal stenosis of the mid-RCA, potentially flow-limiting  Normal left-sided filling pressures (LVEDP 10mmHg)        Given numerous other active issues and lack of anginal symptoms, recommend   medical management of CAD for now.  If other conflicting issues are   stabilized, and anginal symptoms become an issue, return to the cath lab   for PCI would be appropriate.

## 2024-12-03 NOTE — PROGRESS NOTES
Nephrology brief note    Patient with some low UF alarms overnight, slight net negative on 1.5% solutions.    Plan to discharge [place] nurse home today.  I called his home unit in [place], this morning they delivered a week supply of 1.5% dextrose solutions to [facility]..    No barriers to discharge from nephrology perspective, discussed he should continue mostly 1.5% solutions for now and with edema, hypertension he can start doing a mixture of 1.5 and 2.5% dextrose solutions.  Patient expressed understanding.

## 2024-12-03 NOTE — CONSULTS
PATIENT HISTORY: Arnulfo Pritchett is a 64 year old male with history of coronary artery disease with multiple stents, hypertension, hyperlipidemia, chronic paroxysmal A-fib (s/p ablation), ESRD on PD, secondary hyperparathyroidism, RCC (s/p nephrectomy), DM type II, gout, GERD, TALI who was recently admitted to the hospital from 10/15 - 11/9 for critical limb ischemia of the left lower extremity and ischemic left great toe wound (see details of surgery below ), now is being admitted on 11/27/2024 with generalized weakness.     I was asked to see Arnulfo Pritchett  by Leonor Haddad MD for evaluation of wound dehiscence after recent foot surgery.  Patient has a partial left hallux amputation with Dr Haley Joseph on 10/27/24.  He was last seen in the office at follow up on 11/26/24.     PAST MEDICAL HISTORY:   Past Medical History:   Diagnosis Date    CAD (coronary artery disease)     Chronic systolic heart failure (H)     ESRD (end stage renal disease) on dialysis (H)     Gastroesophageal reflux disease without esophagitis     Gout     HLD (hyperlipidemia)     TALI (obstructive sleep apnea)     PAD (peripheral artery disease) (H)     S/p RLE stenting of SFA/popliteal arteries    Type 2 diabetes mellitus with diabetic neuropathy (H)         PAST SURGICAL HISTORY:   Past Surgical History:   Procedure Laterality Date    AMPUTATE TOE(S) Left 10/27/2024    Procedure: AMPUTATION, TOE left great toe;  Surgeon: Haley Joseph DPM, Podiatry/Foot and Ankle Surgery;  Location: [facility] OR    BYPASS GRAFT FEMOROTIBIAL Left 10/25/2024    Procedure: LEFT FEMORAL ENDARTERECTOMY, LEFT FEMORAL TO TIBIAL PERONEAL TRUNK BYPASS WITH LEFT GREAT SEPHANEOUS VEIN, WOUND VAC PLACEMENT ON LEFT GROIN.;  Surgeon: Raul Gray MD;  Location: [facility] OR    BYPASS GRAFT FEMOROTIBIAL Left 10/27/2024    Procedure: CREATION, BYPASS, VASCULAR, FEMORAL TO TIBIAL REVISION OF BYPASS LEFT COMMON FEMORAL TO TIBIAL.;  Surgeon: Raul Gray MD;  Location:  [facility] OR    CV CORONARY ANGIOGRAM N/A 11/27/2024    Procedure: Coronary Angiogram;  Surgeon: Clem Matt MD;  Location:  HEART CARDIAC CATH LAB    CV LOWER EXTREMITY ANGIOGRAM LEFT Left 10/27/2024    Procedure: Angiogram Lower Extremity Left;  Surgeon: Raul Gray MD;  Location:  OR    CV PCI N/A 11/27/2024    Procedure: Percutaneous Coronary Intervention;  Surgeon: Clem Matt MD;  Location:  HEART CARDIAC CATH LAB    IR LOWER EXTREMITY ANGIOGRAM LEFT  10/17/2024        MEDICATIONS:   Current Facility-Administered Medications:     acetaminophen (TYLENOL) tablet 650 mg, 650 mg, Oral, Q4H PRN, 650 mg at 11/30/24 2306 **OR** acetaminophen (TYLENOL) Suppository 650 mg, 650 mg, Rectal, Q4H PRN, Maurizio Glover MD    allopurinol (ZYLOPRIM) tablet 200 mg, 200 mg, Oral, QPM, Maurizio Glover MD, 200 mg at 12/02/24 2131    atorvastatin (LIPITOR) tablet 40 mg, 40 mg, Oral, At Bedtime, Maurizio Glover MD, 40 mg at 12/02/24 2131    calcitRIOL (ROCALTROL) capsule 0.25 mcg, 0.25 mcg, Oral, At Bedtime, Maurizio Glover MD, 0.25 mcg at 12/02/24 2131    calcium carbonate (TUMS) chewable tablet 1,000 mg, 1,000 mg, Oral, 4x Daily PRN, Maurizio Glover MD    carvedilol (COREG) tablet 3.125 mg, 3.125 mg, Oral, BID w/meals, Leonor Haddad MD, 3.125 mg at 12/03/24 0833    cinacalcet (SENSIPAR) tablet 60 mg, 60 mg, Oral, Once per day on Monday Wednesday Friday, Maurizio Glover MD, 60 mg at 12/02/24 0953    clopidogrel (PLAVIX) tablet 75 mg, 75 mg, Oral, QPM, Maurizio Glover MD, 75 mg at 12/02/24 2131    Continuing statin from home medication list OR statin order already placed during this visit, , Does not apply, DOES NOT GO TO [facility] ASKEW Saurabh, MD    glucose gel 15-30 g, 15-30 g, Oral, Q15 Min PRN **OR** dextrose 50 % injection 25-50 mL, 25-50 mL, Intravenous, Q15 Min PRN **OR** glucagon injection 1 mg, 1 mg, Subcutaneous, Q15 Min PRN, Maurizio Glover MD    dianeal PD LOW calcium-1.5% dex (calcium  2.5 mEq/L) 6,000 mL PERITONEAL DIALYSATE, , Dialysis, [facility] Supplied PD, Duncan Lomeli,     gentamicin (GARAMYCIN) 0.1 % cream, , Topical, Daily PRN, Duncan Lomeli,     gentamicin (GARAMYCIN) 0.1 % ointment, , Topical, Daily PRN, Duncan Lomeli, DO, Given at 12/02/24 2034    HYDROmorphone (DILAUDID) injection 0.2 mg, 0.2 mg, Intravenous, Q2H PRN, Maurizio Glover MD    HYDROmorphone (DILAUDID) injection 0.4 mg, 0.4 mg, Intravenous, Q2H PRN, Maurizio Glover MD    insulin glargine (LANTUS PEN) injection 20 Units, 20 Units, Subcutaneous, At Bedtime, Maurizio Glover MD, 20 Units at 12/02/24 2131    lidocaine (LMX4) cream, , Topical, Q1H PRN, Tamy Guthrie PA-C    lidocaine 1 % 0.1-1 mL, 0.1-1 mL, Other, Q1H PRN, Tamy Guthrie PA-C    LORazepam (ATIVAN) injection 0.5 mg, 0.5 mg, Intravenous, Q2H PRN **OR** LORazepam (ATIVAN) tablet 0.5 mg, 0.5 mg, Oral, Q2H PRN, Tamy Guthrie PA-C    medication instruction, , Does not apply, Continuous PRN, Maurizio Glover MD    midodrine (PROAMATINE) tablet 2.5 mg, 2.5 mg, Oral, Once PRN, Dima Romo MD    naloxone (NARCAN) injection 0.2 mg, 0.2 mg, Intravenous, Q2 Min PRN **OR** naloxone (NARCAN) injection 0.4 mg, 0.4 mg, Intravenous, Q2 Min PRN **OR** naloxone (NARCAN) injection 0.2 mg, 0.2 mg, Intramuscular, Q2 Min PRN **OR** naloxone (NARCAN) injection 0.4 mg, 0.4 mg, Intramuscular, Q2 Min PRN, Maurizio Glover MD    ondansetron (ZOFRAN ODT) ODT tab 4 mg, 4 mg, Oral, Q6H PRN, 4 mg at 12/02/24 0554 **OR** ondansetron (ZOFRAN) injection 4 mg, 4 mg, Intravenous, Q6H PRN, Maurizio Glover MD, 4 mg at 11/29/24 1015    oxyCODONE (ROXICODONE) tablet 5 mg, 5 mg, Oral, Q4H PRN **OR** oxyCODONE (ROXICODONE) tablet 10 mg, 10 mg, Oral, Q4H PRN, Rick Marie MD    pantoprazole (PROTONIX) EC tablet 40 mg, 40 mg, Oral, QAM AC, Maurizio Glover MD, 40 mg at 12/03/24 0833    Patient is already receiving anticoagulation with heparin, enoxaparin (LOVENOX), warfarin (COUMADIN)  or other  anticoagulant medication, , Does not apply, Continuous PRN, Maurizio Glover MD    potassium chloride preeti ER (KLOR-CON M20) CR tablet 20 mEq, 20 mEq, Oral, Once PRN, Tamy Guthrie PA-C    Reason ACE/ARB/ARNI order not selected, , Other, DOES NOT GO TO [facility] ASKEW Saurabh, MD    reason aspirin not prescribed (intentional), , Other, DOES NOT GO TO [facility] AS[facility] Saurabh, MD    Reason beta blocker not prescribed, , Does not apply, DOES NOT GO TO [facility] AS[facility]W Saurabh, MD    senna-docusate (SENOKOT-S/PERICOLACE) 8.6-50 MG per tablet 1 tablet, 1 tablet, Oral, BID PRN **OR** senna-docusate (SENOKOT-S/PERICOLACE) 8.6-50 MG per tablet 2 tablet, 2 tablet, Oral, BID PRN, Maurizio Glover MD    sevelamer carbonate (RENVELA) tablet 800 mg, 800 mg, Oral, TID w/meals, Maurizio Glover MD, 800 mg at 12/03/24 0833    sodium chloride (PF) 0.9% PF flush 3 mL, 3 mL, Intracatheter, Q8H, Tamy Guthrie PA-C, 3 mL at 12/02/24 0957    sodium chloride (PF) 0.9% PF flush 3 mL, 3 mL, Intracatheter, Q1H PRN, Tamy Guthrie PA-C    sodium chloride (PF) 0.9% PF flush 3 mL, 3 mL, Intracatheter, q1 min prn, Tamy Guthrie PA-C    warfarin ANTICOAGULANT (COUMADIN) tablet 5 mg, 5 mg, Oral, ONCE at 18:00, Maurizio Glover MD    Warfarin Dose Required Daily - Pharmacist Managed, 1 each, Oral, See Admin Instructions, Dima Romo MD     ALLERGIES:  No Known Allergies     SOCIAL HISTORY:   Social History     Socioeconomic History    Marital status:      Spouse name: Not on file    Number of children: Not on file    Years of education: Not on file    Highest education level: Not on file   Occupational History    Not on file   Tobacco Use    Smoking status: Former     Types: Cigarettes    Smokeless tobacco: Never   Vaping Use    Vaping status: Never Used   Substance and Sexual Activity    Alcohol use: Not Currently     Comment: quit 20 years    Drug use: Never    Sexual activity: Not on file   Other Topics Concern    Not on file   Social History  Narrative    Not on file     Social Drivers of Health     Financial Resource Strain: Low Risk  (11/30/2024)    Financial Resource Strain     Within the past 12 months, have you or your family members you live with been unable to get utilities (heat, electricity) when it was really needed?: No   Food Insecurity: Low Risk  (11/30/2024)    Food Insecurity     Within the past 12 months, did you worry that your food would run out before you got money to buy more?: No     Within the past 12 months, did the food you bought just not last and you didn t have money to get more?: No   Transportation Needs: Low Risk  (11/30/2024)    Transportation Needs     Within the past 12 months, has lack of transportation kept you from medical appointments, getting your medicines, non-medical meetings or appointments, work, or from getting things that you need?: No   Physical Activity: Unknown (10/8/2024)    Exercise Vital Sign     Days of Exercise per Week: 0 days     Minutes of Exercise per Session: Not on file   Stress: No Stress Concern Present (10/8/2024)    [employer] - Occupational Stress Questionnaire     Feeling of Stress : Only a little   Social Connections: Unknown (10/8/2024)    Social Connection and Isolation Panel [NHANES]     Frequency of Communication with Friends and Family: Not on file     Frequency of Social Gatherings with Friends and Family: Patient declined     Attends Congregation Services: Not on file     Active Member of Clubs or Organizations: Not on file     Attends Club or Organization Meetings: Not on file     Marital Status: Not on file   Interpersonal Safety: Low Risk  (11/28/2024)    Interpersonal Safety     Do you feel physically and emotionally safe where you currently live?: Yes     Within the past 12 months, have you been hit, slapped, kicked or otherwise physically hurt by someone?: No     Within the past 12 months, have you been humiliated or emotionally abused in other ways  by your partner or ex-partner?: No   Housing Stability: Low Risk  (11/30/2024)    Housing Stability     Do you have housing? : Yes     Are you worried about losing your housing?: No        FAMILY HISTORY: No family history on file.     EXAM:Vitals: BP (!) 152/95 (BP Location: Right arm)   Pulse 95   Temp 99  F (37.2  C) (Oral)   Resp 18   Wt 80 kg (176 lb 5.9 oz)   SpO2 95%   BMI 24.15 kg/m    BMI= Body mass index is 24.15 kg/m .    WBC: 6.6    INR:  2.39       General appearance: Patient is alert and fully cooperative with history & exam.  No sign of distress is noted during the visit.      Psychiatric: Affect is pleasant & appropriate.  Patient appears motivated to improve health.       Respiratory: Breathing is regular & unlabored while sitting.      HEENT: Hearing is intact to spoken word.  Speech is clear.  No gross evidence of visual impairment that would impact ambulation.       Dermatologic: Left foot with incision site at partial first digit amputation noted, superfiicial dehiscence appreciated.  No malodor, no yessica purulence.  Slight necrosis on the lateral aspect.     Vascular: DP & PT pulses are diminished. No significant edema or varicosities noted.  CFT and skin temperature is normal to both lower extremities.       Neurologic: Lower extremity sensation is intact to light touch.  No evidence of weakness or contracture in the lower extremities.  No evidence of neuropathy.       Musculoskeletal: Patient is ambulatory without assistive device or brace.  No gross ankle deformity noted.  No foot or ankle joint effusion is noted.           ASSESSMENT:  64 year old diabetic male with history of PAD and lower extremity bypass grafting, Partial left great toe amputation on 10/27/24 with superficial wound dehiscence.     PLAN:  Reviewed patient[facility] in [facility].  Patient can follow up in Podiatry clinic as an outpatient. No surgical intervention needed on this admission  Would recommend betadine and dry  dressing to the toe, change every other day  Weightbearing as tolerated  Podiatry related orders for discharge are placed.  Contact if any questions      Mary Kate Brizuela DPM    10:52 AM

## 2024-12-04 ENCOUNTER — PATIENT OUTREACH (OUTPATIENT)
Dept: CARE COORDINATION | Facility: CLINIC | Age: 64
End: 2024-12-04
Payer: COMMERCIAL

## 2024-12-04 ASSESSMENT — ACTIVITIES OF DAILY LIVING (ADL): DEPENDENT_IADLS:: INDEPENDENT

## 2024-12-04 NOTE — LETTER
[facility]   To:             Please give to facility    From:   Viridiana Ziegler  RN  Care Coordinator   [facility]   P: [phone]  [email or web address]   Patient Name:  Arnulfo Pritchett YOB: 1960   Admit date: 12/3/2024      *Information Needed:  Please contact me when the patient will discharge (or if they will move to long term care)- include the discharge date, disposition, and main diagnosis   If the patient is discharged with home care services, please provide the name of the agency    Also- Please inform me if a care conference is being held.   Phone, Fax or Email with information                        Thank you

## 2024-12-04 NOTE — PROGRESS NOTES
Clinic Care Coordination Contact  Care Coordination Transition Communication    Clinical Data: Patient was hospitalized at [facility] from 11/27/2024 to 12/3/2024 with diagnosis of Non-STEMI versus nonischemic myocardial injury due to significant hypotension.     Assessment: Patient has transitioned to TCU/ARU for short term rehabilitation:    Facility Name: [facility] (P: [phone]   F: [phone])     Transition Communication:  Notified facility of Primary Care- Care Coordination support via Epic fax.    Plan: Care Coordinator will await notification from facility staff informing of patient's discharge plans/needs. Care Coordinator will review chart and outreach to facility staff every 4 weeks and as needed.     Viridiana Ziegler RN Care Coordination   [facility]-  [place], [place], [place]  Email: [email or web address]  Phone: [phone]

## 2024-12-05 ENCOUNTER — APPOINTMENT (OUTPATIENT)
Dept: GENERAL RADIOLOGY | Facility: CLINIC | Age: 64
End: 2024-12-05
Attending: EMERGENCY MEDICINE
Payer: MEDICARE

## 2024-12-05 ENCOUNTER — TELEPHONE (OUTPATIENT)
Dept: CARDIOLOGY | Facility: CLINIC | Age: 64
End: 2024-12-05

## 2024-12-05 ENCOUNTER — HOSPITAL ENCOUNTER (OUTPATIENT)
Facility: CLINIC | Age: 64
Setting detail: OBSERVATION
Discharge: NURSING, REHAB OR LONG-TERM CARE | End: 2024-12-07
Attending: EMERGENCY MEDICINE | Admitting: INTERNAL MEDICINE
Payer: MEDICARE

## 2024-12-05 ENCOUNTER — OFFICE VISIT (OUTPATIENT)
Dept: CARDIOLOGY | Facility: CLINIC | Age: 64
End: 2024-12-05
Payer: MEDICARE

## 2024-12-05 VITALS
HEART RATE: 52 BPM | SYSTOLIC BLOOD PRESSURE: 75 MMHG | OXYGEN SATURATION: 93 % | HEIGHT: 72 IN | DIASTOLIC BLOOD PRESSURE: 50 MMHG | BODY MASS INDEX: 23.72 KG/M2 | WEIGHT: 175.1 LBS | RESPIRATION RATE: 16 BRPM

## 2024-12-05 DIAGNOSIS — I24.9 ACS (ACUTE CORONARY SYNDROME) (H): ICD-10-CM

## 2024-12-05 DIAGNOSIS — I50.22 CHRONIC SYSTOLIC CONGESTIVE HEART FAILURE (H): ICD-10-CM

## 2024-12-05 DIAGNOSIS — I25.5 ISCHEMIC CARDIOMYOPATHY: ICD-10-CM

## 2024-12-05 DIAGNOSIS — G47.33 OSA ON CPAP: ICD-10-CM

## 2024-12-05 DIAGNOSIS — I73.9 PERIPHERAL ARTERIAL DISEASE (H): ICD-10-CM

## 2024-12-05 DIAGNOSIS — I95.0 IDIOPATHIC HYPOTENSION: Primary | ICD-10-CM

## 2024-12-05 DIAGNOSIS — I21.11 ST ELEVATION MYOCARDIAL INFARCTION INVOLVING RIGHT CORONARY ARTERY (H): ICD-10-CM

## 2024-12-05 DIAGNOSIS — I50.22 CHRONIC SYSTOLIC HEART FAILURE (H): ICD-10-CM

## 2024-12-05 DIAGNOSIS — E86.1 HYPOVOLEMIA: ICD-10-CM

## 2024-12-05 DIAGNOSIS — I10 ESSENTIAL (PRIMARY) HYPERTENSION: ICD-10-CM

## 2024-12-05 DIAGNOSIS — I25.119 CORONARY ARTERY DISEASE INVOLVING NATIVE CORONARY ARTERY OF NATIVE HEART WITH ANGINA PECTORIS (H): ICD-10-CM

## 2024-12-05 DIAGNOSIS — E78.5 DYSLIPIDEMIA: ICD-10-CM

## 2024-12-05 DIAGNOSIS — I48.0 PAROXYSMAL ATRIAL FIBRILLATION (H): ICD-10-CM

## 2024-12-05 DIAGNOSIS — I21.4 NSTEMI (NON-ST ELEVATED MYOCARDIAL INFARCTION) (H): Primary | ICD-10-CM

## 2024-12-05 DIAGNOSIS — I95.1 ORTHOSTATIC HYPOTENSION: ICD-10-CM

## 2024-12-05 LAB
ALBUMIN SERPL BCG-MCNC: 2.6 G/DL (ref 3.5–5.2)
ALP SERPL-CCNC: 243 U/L (ref 40–150)
ALT SERPL W P-5'-P-CCNC: 17 U/L (ref 0–70)
ANION GAP SERPL CALCULATED.3IONS-SCNC: 12 MMOL/L (ref 7–15)
AST SERPL W P-5'-P-CCNC: 33 U/L (ref 0–45)
ATRIAL RATE - MUSE: 106 BPM
BASE EXCESS BLDV CALC-SCNC: 4 MMOL/L (ref -3–3)
BASOPHILS # BLD AUTO: 0 10E3/UL (ref 0–0.2)
BASOPHILS NFR BLD AUTO: 1 %
BILIRUB SERPL-MCNC: 0.3 MG/DL
BUN SERPL-MCNC: 31.7 MG/DL (ref 8–23)
CALCIUM SERPL-MCNC: 8.8 MG/DL (ref 8.8–10.4)
CHLORIDE SERPL-SCNC: 93 MMOL/L (ref 98–107)
CREAT SERPL-MCNC: 7.47 MG/DL (ref 0.67–1.17)
DIASTOLIC BLOOD PRESSURE - MUSE: NORMAL MMHG
EGFRCR SERPLBLD CKD-EPI 2021: 7 ML/MIN/1.73M2
EOSINOPHIL # BLD AUTO: 0.7 10E3/UL (ref 0–0.7)
EOSINOPHIL NFR BLD AUTO: 8 %
ERYTHROCYTE [DISTWIDTH] IN BLOOD BY AUTOMATED COUNT: 19.1 % (ref 10–15)
GLUCOSE BLDC GLUCOMTR-MCNC: 128 MG/DL (ref 70–99)
GLUCOSE BLDC GLUCOMTR-MCNC: 217 MG/DL (ref 70–99)
GLUCOSE BLDC GLUCOMTR-MCNC: 221 MG/DL (ref 70–99)
GLUCOSE SERPL-MCNC: 170 MG/DL (ref 70–99)
HCO3 BLDV-SCNC: 30 MMOL/L (ref 21–28)
HCO3 SERPL-SCNC: 27 MMOL/L (ref 22–29)
HCT VFR BLD AUTO: 36.5 % (ref 40–53)
HGB BLD-MCNC: 11.9 G/DL (ref 13.3–17.7)
HOLD SPECIMEN: NORMAL
IMM GRANULOCYTES # BLD: 0.1 10E3/UL
IMM GRANULOCYTES NFR BLD: 1 %
INR PPP: 2.29 (ref 0.85–1.15)
INR PPP: 2.3 (ref 0.85–1.15)
INTERPRETATION ECG - MUSE: NORMAL
LACTATE BLD-SCNC: 1.2 MMOL/L
LYMPHOCYTES # BLD AUTO: 1.1 10E3/UL (ref 0.8–5.3)
LYMPHOCYTES NFR BLD AUTO: 13 %
MCH RBC QN AUTO: 31.2 PG (ref 26.5–33)
MCHC RBC AUTO-ENTMCNC: 32.6 G/DL (ref 31.5–36.5)
MCV RBC AUTO: 96 FL (ref 78–100)
MONOCYTES # BLD AUTO: 0.6 10E3/UL (ref 0–1.3)
MONOCYTES NFR BLD AUTO: 8 %
NEUTROPHILS # BLD AUTO: 5.5 10E3/UL (ref 1.6–8.3)
NEUTROPHILS NFR BLD AUTO: 69 %
NRBC # BLD AUTO: 0 10E3/UL
NRBC BLD AUTO-RTO: 0 /100
P AXIS - MUSE: 112 DEGREES
PCO2 BLDV: 47 MM HG (ref 40–50)
PH BLDV: 7.4 [PH] (ref 7.32–7.43)
PLATELET # BLD AUTO: 273 10E3/UL (ref 150–450)
PO2 BLDV: 16 MM HG (ref 25–47)
POTASSIUM SERPL-SCNC: 4.2 MMOL/L (ref 3.4–5.3)
PR INTERVAL - MUSE: 190 MS
PROCALCITONIN SERPL IA-MCNC: 0.34 NG/ML
PROT SERPL-MCNC: 5.8 G/DL (ref 6.4–8.3)
QRS DURATION - MUSE: 98 MS
QT - MUSE: 342 MS
QTC - MUSE: 454 MS
R AXIS - MUSE: -70 DEGREES
RBC # BLD AUTO: 3.82 10E6/UL (ref 4.4–5.9)
SAO2 % BLDV: 22 % (ref 70–75)
SODIUM SERPL-SCNC: 132 MMOL/L (ref 135–145)
SYSTOLIC BLOOD PRESSURE - MUSE: NORMAL MMHG
T AXIS - MUSE: 81 DEGREES
VENTRICULAR RATE- MUSE: 106 BPM
WBC # BLD AUTO: 7.9 10E3/UL (ref 4–11)

## 2024-12-05 PROCEDURE — 85610 PROTHROMBIN TIME: CPT | Performed by: EMERGENCY MEDICINE

## 2024-12-05 PROCEDURE — 82040 ASSAY OF SERUM ALBUMIN: CPT | Performed by: EMERGENCY MEDICINE

## 2024-12-05 PROCEDURE — 71046 X-RAY EXAM CHEST 2 VIEWS: CPT

## 2024-12-05 PROCEDURE — 90999 UNLISTED DIALYSIS PROCEDURE: CPT

## 2024-12-05 PROCEDURE — 250N000013 HC RX MED GY IP 250 OP 250 PS 637: Performed by: EMERGENCY MEDICINE

## 2024-12-05 PROCEDURE — 250N000013 HC RX MED GY IP 250 OP 250 PS 637: Performed by: PHYSICIAN ASSISTANT

## 2024-12-05 PROCEDURE — 99214 OFFICE O/P EST MOD 30 MIN: CPT | Performed by: INTERNAL MEDICINE

## 2024-12-05 PROCEDURE — 85610 PROTHROMBIN TIME: CPT | Performed by: PHYSICIAN ASSISTANT

## 2024-12-05 PROCEDURE — 99285 EMERGENCY DEPT VISIT HI MDM: CPT | Mod: 25

## 2024-12-05 PROCEDURE — 96360 HYDRATION IV INFUSION INIT: CPT

## 2024-12-05 PROCEDURE — 99223 1ST HOSP IP/OBS HIGH 75: CPT | Performed by: PHYSICIAN ASSISTANT

## 2024-12-05 PROCEDURE — G0378 HOSPITAL OBSERVATION PER HR: HCPCS

## 2024-12-05 PROCEDURE — 99214 OFFICE O/P EST MOD 30 MIN: CPT | Mod: 24 | Performed by: INTERNAL MEDICINE

## 2024-12-05 PROCEDURE — 82803 BLOOD GASES ANY COMBINATION: CPT

## 2024-12-05 PROCEDURE — 250N000012 HC RX MED GY IP 250 OP 636 PS 637: Performed by: PHYSICIAN ASSISTANT

## 2024-12-05 PROCEDURE — 36415 COLL VENOUS BLD VENIPUNCTURE: CPT | Performed by: PHYSICIAN ASSISTANT

## 2024-12-05 PROCEDURE — 258N000003 HC RX IP 258 OP 636: Performed by: EMERGENCY MEDICINE

## 2024-12-05 PROCEDURE — 90945 DIALYSIS ONE EVALUATION: CPT

## 2024-12-05 PROCEDURE — 250N000013 HC RX MED GY IP 250 OP 250 PS 637: Performed by: INTERNAL MEDICINE

## 2024-12-05 PROCEDURE — 82962 GLUCOSE BLOOD TEST: CPT

## 2024-12-05 PROCEDURE — 96361 HYDRATE IV INFUSION ADD-ON: CPT

## 2024-12-05 PROCEDURE — 93005 ELECTROCARDIOGRAM TRACING: CPT

## 2024-12-05 PROCEDURE — 85004 AUTOMATED DIFF WBC COUNT: CPT | Performed by: EMERGENCY MEDICINE

## 2024-12-05 PROCEDURE — 36415 COLL VENOUS BLD VENIPUNCTURE: CPT | Performed by: EMERGENCY MEDICINE

## 2024-12-05 PROCEDURE — 84145 PROCALCITONIN (PCT): CPT | Performed by: EMERGENCY MEDICINE

## 2024-12-05 RX ORDER — SEVELAMER CARBONATE 800 MG/1
800 TABLET, FILM COATED ORAL
Status: DISCONTINUED | OUTPATIENT
Start: 2024-12-05 | End: 2024-12-07 | Stop reason: HOSPADM

## 2024-12-05 RX ORDER — CLOPIDOGREL BISULFATE 75 MG/1
75 TABLET ORAL EVERY EVENING
Status: DISCONTINUED | OUTPATIENT
Start: 2024-12-05 | End: 2024-12-07 | Stop reason: HOSPADM

## 2024-12-05 RX ORDER — CALCITRIOL 0.25 UG/1
0.25 CAPSULE, LIQUID FILLED ORAL AT BEDTIME
Status: DISCONTINUED | OUTPATIENT
Start: 2024-12-05 | End: 2024-12-07 | Stop reason: HOSPADM

## 2024-12-05 RX ORDER — MIDODRINE HYDROCHLORIDE 2.5 MG/1
2.5 TABLET ORAL ONCE
Status: COMPLETED | OUTPATIENT
Start: 2024-12-05 | End: 2024-12-05

## 2024-12-05 RX ORDER — ONDANSETRON 4 MG/1
4 TABLET, ORALLY DISINTEGRATING ORAL EVERY 6 HOURS PRN
Status: DISCONTINUED | OUTPATIENT
Start: 2024-12-05 | End: 2024-12-07 | Stop reason: HOSPADM

## 2024-12-05 RX ORDER — OXYCODONE HYDROCHLORIDE 5 MG/1
5 TABLET ORAL EVERY 6 HOURS PRN
Status: DISCONTINUED | OUTPATIENT
Start: 2024-12-05 | End: 2024-12-07 | Stop reason: HOSPADM

## 2024-12-05 RX ORDER — PANTOPRAZOLE SODIUM 40 MG/1
40 TABLET, DELAYED RELEASE ORAL
Status: DISCONTINUED | OUTPATIENT
Start: 2024-12-06 | End: 2024-12-07 | Stop reason: HOSPADM

## 2024-12-05 RX ORDER — DEXTROSE MONOHYDRATE 25 G/50ML
25-50 INJECTION, SOLUTION INTRAVENOUS
Status: DISCONTINUED | OUTPATIENT
Start: 2024-12-05 | End: 2024-12-07 | Stop reason: HOSPADM

## 2024-12-05 RX ORDER — ALLOPURINOL 100 MG/1
200 TABLET ORAL EVERY EVENING
Status: DISCONTINUED | OUTPATIENT
Start: 2024-12-05 | End: 2024-12-07 | Stop reason: HOSPADM

## 2024-12-05 RX ORDER — GENTAMICIN SULFATE 1 MG/G
CREAM TOPICAL DAILY PRN
Status: DISCONTINUED | OUTPATIENT
Start: 2024-12-05 | End: 2024-12-05

## 2024-12-05 RX ORDER — ATORVASTATIN CALCIUM 40 MG/1
40 TABLET, FILM COATED ORAL AT BEDTIME
Status: DISCONTINUED | OUTPATIENT
Start: 2024-12-05 | End: 2024-12-07 | Stop reason: HOSPADM

## 2024-12-05 RX ORDER — NALOXONE HYDROCHLORIDE 0.4 MG/ML
0.2 INJECTION, SOLUTION INTRAMUSCULAR; INTRAVENOUS; SUBCUTANEOUS
Status: DISCONTINUED | OUTPATIENT
Start: 2024-12-05 | End: 2024-12-07 | Stop reason: HOSPADM

## 2024-12-05 RX ORDER — NALOXONE HYDROCHLORIDE 0.4 MG/ML
0.4 INJECTION, SOLUTION INTRAMUSCULAR; INTRAVENOUS; SUBCUTANEOUS
Status: DISCONTINUED | OUTPATIENT
Start: 2024-12-05 | End: 2024-12-07 | Stop reason: HOSPADM

## 2024-12-05 RX ORDER — ONDANSETRON 2 MG/ML
4 INJECTION INTRAMUSCULAR; INTRAVENOUS EVERY 6 HOURS PRN
Status: DISCONTINUED | OUTPATIENT
Start: 2024-12-05 | End: 2024-12-07 | Stop reason: HOSPADM

## 2024-12-05 RX ORDER — CARVEDILOL 3.12 MG/1
3.12 TABLET ORAL 2 TIMES DAILY WITH MEALS
Status: DISCONTINUED | OUTPATIENT
Start: 2024-12-05 | End: 2024-12-06

## 2024-12-05 RX ORDER — PROCHLORPERAZINE MALEATE 5 MG/1
5 TABLET ORAL EVERY 6 HOURS PRN
Status: DISCONTINUED | OUTPATIENT
Start: 2024-12-05 | End: 2024-12-07 | Stop reason: HOSPADM

## 2024-12-05 RX ORDER — AMOXICILLIN 250 MG
2 CAPSULE ORAL 2 TIMES DAILY PRN
Status: DISCONTINUED | OUTPATIENT
Start: 2024-12-05 | End: 2024-12-07 | Stop reason: HOSPADM

## 2024-12-05 RX ORDER — CINACALCET 30 MG/1
60 TABLET, FILM COATED ORAL
Status: DISCONTINUED | OUTPATIENT
Start: 2024-12-06 | End: 2024-12-07 | Stop reason: HOSPADM

## 2024-12-05 RX ORDER — AMOXICILLIN 250 MG
1 CAPSULE ORAL 2 TIMES DAILY PRN
Status: DISCONTINUED | OUTPATIENT
Start: 2024-12-05 | End: 2024-12-07 | Stop reason: HOSPADM

## 2024-12-05 RX ORDER — NICOTINE POLACRILEX 4 MG
15-30 LOZENGE BUCCAL
Status: DISCONTINUED | OUTPATIENT
Start: 2024-12-05 | End: 2024-12-07 | Stop reason: HOSPADM

## 2024-12-05 RX ORDER — WARFARIN SODIUM 5 MG/1
5 TABLET ORAL
Status: COMPLETED | OUTPATIENT
Start: 2024-12-05 | End: 2024-12-05

## 2024-12-05 RX ORDER — GENTAMICIN SULFATE 1 MG/G
OINTMENT TOPICAL DAILY PRN
Status: DISCONTINUED | OUTPATIENT
Start: 2024-12-05 | End: 2024-12-07 | Stop reason: HOSPADM

## 2024-12-05 RX ADMIN — INSULIN GLARGINE 20 UNITS: 100 INJECTION, SOLUTION SUBCUTANEOUS at 23:40

## 2024-12-05 RX ADMIN — MIDODRINE HYDROCHLORIDE 2.5 MG: 2.5 TABLET ORAL at 11:27

## 2024-12-05 RX ADMIN — CARVEDILOL 3.12 MG: 3.12 TABLET, FILM COATED ORAL at 18:49

## 2024-12-05 RX ADMIN — ATORVASTATIN CALCIUM 40 MG: 40 TABLET, FILM COATED ORAL at 21:27

## 2024-12-05 RX ADMIN — ALLOPURINOL 200 MG: 100 TABLET ORAL at 20:07

## 2024-12-05 RX ADMIN — WARFARIN SODIUM 5 MG: 5 TABLET ORAL at 18:49

## 2024-12-05 RX ADMIN — SODIUM CHLORIDE 500 ML: 9 INJECTION, SOLUTION INTRAVENOUS at 11:29

## 2024-12-05 RX ADMIN — CLOPIDOGREL BISULFATE 75 MG: 75 TABLET ORAL at 20:01

## 2024-12-05 RX ADMIN — CALCITRIOL CAPSULES 0.25 MCG 0.25 MCG: 0.25 CAPSULE ORAL at 21:27

## 2024-12-05 ASSESSMENT — ACTIVITIES OF DAILY LIVING (ADL)
ADLS_ACUITY_SCORE: 57
ADLS_ACUITY_SCORE: 57
ADLS_ACUITY_SCORE: 53
ADLS_ACUITY_SCORE: 57
ADLS_ACUITY_SCORE: 51
ADLS_ACUITY_SCORE: 51
ADLS_ACUITY_SCORE: 57
ADLS_ACUITY_SCORE: 51
ADLS_ACUITY_SCORE: 57

## 2024-12-05 NOTE — ED TRIAGE NOTES
Ambulatory from heart clinic visit where blood pressures were 70/30. Last week, had hypotensive episode assoc with severe dehydration and was admitted for 6 days. Denies CP, denies lightheadedness. SOB with exertion. Vomited today. 78/54 in triage.

## 2024-12-05 NOTE — PROGRESS NOTES
Primary Cardiologist: Dr. Spears (previously with [facility])    Reason for Visit: Hospital follow up    History of Present Illness:   Arnulfo is a very pleasant 64 year old male who was admitted on 11/27/2024 directly from vascular surgery clinic due to significant hypotension with systolic blood pressure in the 60s.  He has past medical history notable for CAD (history of inferior STEMI in 2017 requiring PCI to the RCA; history of PCI to the OM and LCx in 2021; most recent ischemic evaluation with repeat coronary angiogram during his recent admission which showed severe two-vessel coronary artery disease, proximal RCA 70% stenosed and mid circumflex to distal circumflex 80% stenosed--given lack of angina and other acute events conservative approach was recommended; on aspirin, statin, and BB), Severe PAD (hx of left femoral endarterectomy and bovine pericardial patch angioplasty, left distal common femoral arterial/proximal superficial femoral artery and tibioperoneal trunk bypass 2024; hx of SFA popliteal balloon angioplasty and stenting in May 2024; hx of ligation of side branches of the left saphenous vein and temporary closure of the right groin with wound VAC October 2024; Ischemic great left toe status post amputation October 27, 2024  ), ESRD (hx of RCC with nephrectomy; on peritoneal dialysis; on torsemide 100 mg daily; diuretics and fluid status has been managed by nephrology), chronic severe HFrEF/ischemic cardiomyopathy (titration of GDMT has been challenging due to low blood pressures), T2DM, history of CVA, anemia of chronic disease, TALI (compliant with CPAP), GERD, and persistent atrial fibrillation (hx of ablation; rate control; on warfarin).     During his most recent admission due to profound hypotension, blood pressure/GDMT medications were held while pressors (including dobutamine) initiated. At the time of his Left heart catheterization, his LVEDP was only 11 mmHg. His hypotension was felt to be not  cardiogenic shock but more from hypovolemia/dehydration. He required blood transfusions as well due to Hgb antonio of ~7. Cardiology had recommended discontinuing dobutamine and slowly re instituting pta GDMT medications (carvedilol and Entresto). He was discharged to [facility]. It appears he was still symptomatic from hypotension and was having positive orthostatics on the day of discharge. Of note, he was admitted prior to this in 10/2024 with critical limb ischemia.    He presents to clinic today stating he is feeling lightheaded and presyncopal. He threw up his breakfast this morning. He still took his morning medications including Entresto and Carvedilol. He denies peripheral edema, orthopnea, PND, bleeding issues, or chest discomfort. He denies feeling lightheaded yesterday.     Assessment and Plan:  Arnulfo is a very pleasant 64 year old male with complex past medical history. He presents today about 3 days after his discharge for hospital follow up. His recent hypotension was felt to be related to hypovolemia rather than true cardiac cause. His cardiac work up was stable. He is not sure if has nephrology follow up. He is intermittently lightheaded during clinic visit with request to lay down. After some water intake his lightheadedness is partially better but his SBP remains in the 70's. Per his rehab medication list he is on Entresto and Carvedilol. I feel Entresto will need to be discontinued as he is not able to tolerate this. Nephrology will need to be involved making further recommendations on volume management. Given persistent symptomatic hypotension, patient will be escorted to the ED today for expedited care.     His past medical history as follows-     # Symptomatic Hypotension  -- recheck BP shows persistent Systolic in the 70's    # CAD  -- most recent cor angio several weeks ago showed stable 2-vessel disease  -- no reports of angina today    # HFrEF/Ischemic CM  -- does not appears hypervolemic  --  per rehab notes he is back on entresto and carvedilol  -- entresto will likely be discontinued    # Severe PAD  -- long standing history - details above    # ESRD, with Peritoneal dialysis     # Hx of CVA    # TALI compliant with CPAP    # GERD    # Persistent atrial fibrillation  -- HR's stable    Given symptomatic hypotension he was escorted to the ED for expedited care.     45 minutes spent on the date of the encounter with chart review, patient visit, care coordination, and documentation.    The longitudinal plan of care for the diagnose(s)/condition(s) as documented were addressed during this visit. Due to the added complexity in care, I will continue to support Arnulfo Pritchett  in the subsequent management and with ongoing continuity of care.     This note was completed in part using Dragon voice recognition software. Although reviewed after completion, some word and grammatical errors may occur.    Orders this Visit:  No orders of the defined types were placed in this encounter.    No orders of the defined types were placed in this encounter.    There are no discontinued medications.      No diagnosis found.    CURRENT MEDICATIONS:  Current Outpatient Medications   Medication Sig Dispense Refill    acetaminophen (TYLENOL) 500 MG tablet Take 1,000 mg by mouth every 8 hours as needed.      allopurinol (ZYLOPRIM) 100 MG tablet Take 200 mg by mouth every evening      atorvastatin (LIPITOR) 40 MG tablet Take 40 mg by mouth at bedtime.      B Complex-C-Folic Acid (DIALYVITE) TABS Take 1 tablet by mouth daily.      calcitRIOL (ROCALTROL) 0.25 MCG capsule Take 0.25 mcg by mouth at bedtime.      carvedilol (COREG) 3.125 MG tablet Take 1 tablet (3.125 mg) by mouth 2 times daily (with meals).      cinacalcet (SENSIPAR) 60 MG tablet Take 60 mg by mouth. Take 1 tablet (60 mg) three times a week: Monday, Wednesday, and Friday nights      clopidogrel (PLAVIX) 75 MG tablet Take 75 mg by mouth every evening.      gentamicin  (GARAMYCIN) 0.1 % external cream Apply topically daily as needed. Apply topically on peritoneal access site to prevent infections      glucose 40 % (400 mg/mL) gel Take 15-30 g by mouth every 15 minutes as needed for low blood sugar. (Patient taking differently: Take 35 g by mouth every 15 minutes as needed for low blood sugar.)      insulin glargine (LANTUS PEN) 100 UNIT/ML pen Inject 20 Units subcutaneously at bedtime.      melatonin 5 MG tablet Take 5 mg by mouth at bedtime      omeprazole (PRILOSEC) 20 MG DR capsule Take 20 mg by mouth daily.      oxyCODONE (ROXICODONE) 5 MG tablet Take 1 tablet (5 mg) by mouth every 6 hours as needed for severe pain. 10 tablet 0    polyethylene glycol (MIRALAX) 17 g packet Take 1 packet by mouth daily.      sacubitril-valsartan (ENTRESTO) 49-51 MG per tablet Take 0.5 tablets by mouth 2 times daily.      senna-docusate (SENOKOT-S/PERICOLACE) 8.6-50 MG tablet Take 1 tablet by mouth daily as needed for constipation.      SEVELAMER CARBONATE PO Take 800 mg by mouth 3 times daily (with meals)      torsemide (DEMADEX) 100 MG tablet Take 1 tablet (100 mg) by mouth every morning.      warfarin ANTICOAGULANT (COUMADIN) 3 MG tablet Take 3 mg by mouth five times a week. Take 3 mg Mon, Thu, Fri, Sat and Sun      warfarin ANTICOAGULANT (COUMADIN) 4 MG tablet Take 4 mg by mouth twice a week. Takes 4 mg Tue and Wed      midodrine (PROAMATINE) 2.5 MG tablet Take 1 tablet (2.5 mg) by mouth once as needed (hypotension/dizziness post PD in AM during the day). (Patient not taking: Reported on 12/5/2024)         ALLERGIES   No Known Allergies    PAST MEDICAL HISTORY:  Past Medical History:   Diagnosis Date    CAD (coronary artery disease)     Chronic systolic heart failure (H)     ESRD (end stage renal disease) on dialysis (H)     Gastroesophageal reflux disease without esophagitis     Gout     HLD (hyperlipidemia)     TALI (obstructive sleep apnea)     PAD (peripheral artery disease) (H)     S/p  RLE stenting of SFA/popliteal arteries    Type 2 diabetes mellitus with diabetic neuropathy (H)        PAST SURGICAL HISTORY:  Past Surgical History:   Procedure Laterality Date    AMPUTATE TOE(S) Left 10/27/2024    Procedure: AMPUTATION, TOE left great toe;  Surgeon: Haley Joseph DPM, Podiatry/Foot and Ankle Surgery;  Location: [facility] OR    BYPASS GRAFT FEMOROTIBIAL Left 10/25/2024    Procedure: LEFT FEMORAL ENDARTERECTOMY, LEFT FEMORAL TO TIBIAL PERONEAL TRUNK BYPASS WITH LEFT GREAT SEPHANEOUS VEIN, WOUND VAC PLACEMENT ON LEFT GROIN.;  Surgeon: Raul Gray MD;  Location: [facility] OR    BYPASS GRAFT FEMOROTIBIAL Left 10/27/2024    Procedure: CREATION, BYPASS, VASCULAR, FEMORAL TO TIBIAL REVISION OF BYPASS LEFT COMMON FEMORAL TO TIBIAL.;  Surgeon: Raul Gray MD;  Location: [facility] OR    CV CORONARY ANGIOGRAM N/A 11/27/2024    Procedure: Coronary Angiogram;  Surgeon: Clem Matt MD;  Location:  [facility]    CV LOWER EXTREMITY ANGIOGRAM LEFT Left 10/27/2024    Procedure: Angiogram Lower Extremity Left;  Surgeon: Raul Gray MD;  Location:  OR    CV PCI N/A 11/27/2024    Procedure: Percutaneous Coronary Intervention;  Surgeon: Clem Matt MD;  Location:  HEART CARD[facility] CATH LAB    IR LOWER EXTREMITY ANGIOGRAM LEFT  10/17/2024       FAMILY HISTORY:  No family history on file.    SOCIAL HISTORY:  Social History     Socioeconomic History    Marital status:    Tobacco Use    Smoking status: Former     Types: Cigarettes    Smokeless tobacco: Never   Vaping Use    Vaping status: Never Used   Substance and Sexual Activity    Alcohol use: Not Currently     Comment: quit 20 years    Drug use: Never     Social Drivers of Health     Financial Resource Strain: Low Risk  (11/30/2024)    Financial Resource Strain     Within the past 12 months, have you or your family members you live with been unable to get utilities (heat, electricity) when it was really  needed?: No   Food Insecurity: Low Risk  (11/30/2024)    Food Insecurity     Within the past 12 months, did you worry that your food would run out before you got money to buy more?: No     Within the past 12 months, did the food you bought just not last and you didn t have money to get more?: No   Transportation Needs: Low Risk  (11/30/2024)    Transportation Needs     Within the past 12 months, has lack of transportation kept you from medical appointments, getting your medicines, non-medical meetings or appointments, work, or from getting things that you need?: No   Physical Activity: Unknown (10/8/2024)    Exercise Vital Sign     Days of Exercise per Week: 0 days   Stress: No Stress Concern Present (10/8/2024)    [employer] [place] [employer] - Occupational Stress Questionnaire     Feeling of Stress : Only a little   Social Connections: Unknown (10/8/2024)    Social Connection and Isolation Panel [NHANES]     Frequency of Social Gatherings with Friends and Family: Patient declined   Interpersonal Safety: Low Risk  (11/28/2024)    Interpersonal Safety     Do you feel physically and emotionally safe where you currently live?: Yes     Within the past 12 months, have you been hit, slapped, kicked or otherwise physically hurt by someone?: No     Within the past 12 months, have you been humiliated or emotionally abused in other ways by your partner or ex-partner?: No   Housing Stability: Low Risk  (11/30/2024)    Housing Stability     Do you have housing? : Yes     Are you worried about losing your housing?: No       Review of Systems:  Skin:        Eyes:       ENT:       Respiratory:       Cardiovascular:       Gastroenterology:      Genitourinary:       Musculoskeletal:       Neurologic:       Psychiatric:       Heme/Lymph/Imm:       Endocrine:         Physical Exam:  Vitals: BP (!) 75/50   Pulse 52   Resp 16   Ht 1.829 m (6')   Wt 79.4 kg (175 lb 1.6 oz)   SpO2 93%   BMI 23.75 kg/m       GEN:  In  "moderate distress. Thin and frail appearing. Pale.  NECK: No JVD  C/V:  Regular rate and rhythm, no murmur, rub or gallop.  RESP: Clear to auscultation bilaterally.  GI: Abdomen soft, nontender, nondistended.   EXTREM: No pitting LE edema.   NEURO: Alert and oriented, cooperative. No obvious focal deficits.   PSYCH: Normal affect.      Recent Lab Results:  LIPID RESULTS:  No results found for: \"CHOL\", \"HDL\", \"LDL\", \"TRIG\", \"CHOLHDLRATIO\"    LIVER ENZYME RESULTS:  No results found for: \"AST\", \"ALT\"    CBC RESULTS:  Lab Results   Component Value Date    WBC 6.6 12/03/2024    RBC 3.16 (L) 12/03/2024    HGB 9.8 (L) 12/03/2024    HCT 29.7 (L) 12/03/2024    MCV 94 12/03/2024    MCH 31.0 12/03/2024    MCHC 33.0 12/03/2024    RDW 18.9 (H) 12/03/2024     12/03/2024       BMP RESULTS:  Lab Results   Component Value Date     12/03/2024    POTASSIUM 3.5 12/03/2024    CHLORIDE 99 12/03/2024    CO2 26 12/03/2024    ANIONGAP 10 12/03/2024    GLC 78 12/03/2024    BUN 31.4 (H) 12/03/2024    CR 7.39 (H) 12/03/2024    GFRESTIMATED 8 (L) 12/03/2024    TERENCE 8.4 (L) 12/03/2024        A1C RESULTS:  Lab Results   Component Value Date    A1C 5.7 (H) 10/16/2024       INR RESULTS:  Lab Results   Component Value Date    INR 2.39 (H) 12/03/2024    INR 1.92 (H) 12/02/2024             Kylie Boateng PA-C  December 5, 2024     "

## 2024-12-05 NOTE — ED PROVIDER NOTES
Emergency Department Note      History of Present Illness     Chief Complaint   Hypotension      HPI   Arnulfo Pritchett is a 64 year old male on Coumadin with history of CKD, AFIB, CHF, CAD, type 2 diabetes, STEMI, CVA, hypertension, and hyperlipidemia who presents with hypotension. The patient reports that he was at a heart clinic visit this morning who found his blood pressure to be 70/30, so he was sent to the ED for further evaluation. He affirms that he has been able to ambulate, but he was somewhat dizzy while walking this morning. He states that this dizziness has resolved after lying down. The patient does note one episode of cough, nausea, and vomiting this morning but otherwise has not had recent illness. He denies abdominal pain, fever, and cough. The patient states that he was discharged from the hospital 2 days ago with a prescription for midodrine for hypotension, but he is not aware of being given it by his group home staff. Of note, the patient has a peritoneal dialysis port in place that he does nightly.      Independent Historian   None    Review of External Notes   I reviewed the patient's discharge summary from 12/3/24 for hypotension and NSTEMI. I also reviewed the patient's cardiology clinic visit note from today, 12/5/24    Past Medical History     Medical History and Problem List   Allergic rhinitis  Peripheral vascular disease  Anemia  CKD  Coagulation defect  Hyperlipidemia  Anaphylactic shock  Chronic gout  AFIB  Type 2 diabetes  CHF  CAD  ED  Hypertension  CVA  Left homonymous hemianopsia  TALI  Malignant neoplasm of kidney  Pleural effusion  Renal mass  Venous stasis  Pneumonia  Iron deficiency  Ischemic cardiomyopathy  STEMI  Hyperparathyroidism     Medications   Semglee  Coreg  midodrine  Zyloprim  Lipitor  Rocaltrol  Sensipar  Lantus pen  Prilosec  Roxicodone  Entresto  Demadex  Coumadin     Surgical History   Amputate left great toe   Femorotibial bypass graft (L)  CV lower extremity  angiogram (L)      Physical Exam     Patient Vitals for the past 24 hrs:   BP Temp Pulse Resp SpO2 Height Weight   12/05/24 1210 (!) 143/102 -- 95 21 97 % -- --   12/05/24 1207 -- -- -- -- -- 1.829 m (6') 79.4 kg (175 lb 1.6 oz)   12/05/24 1200 -- -- 96 18 97 % -- --   12/05/24 1130 (!) 117/92 -- 104 10 96 % -- --   12/05/24 1115 97/75 -- 102 -- -- -- --   12/05/24 1112 112/86 -- 103 -- -- -- --   12/05/24 1111 -- -- 105 -- -- -- --   12/05/24 1110 -- -- 113 -- -- -- --   12/05/24 1109 -- -- 112 -- -- -- --   12/05/24 1108 (!) 85/56 -- 101 -- -- -- --   12/05/24 1107 (!) 87/66 -- 105 -- -- -- --   12/05/24 1042 (!) 78/54 97.3  F (36.3  C) 116 14 98 % -- --     Physical Exam  General: Patient is alert and normal appearing.  HEENT: Head atraumatic    Eyes: pupils equal and reactive. Conjunctiva clear   Nares: patent   Oropharynx: no lesions, uvula midline, no palatal draping, normal voice, no trismus  Neck: Supple without lymphadenopathy, no meningismus  Chest: Heart regular rate and rhythm.   Lungs: Equal clear to auscultation with no wheeze or rales  Abdomen: Soft, non tender, nondistended, normal bowel sounds.  Peritoneal dialysis catheter in place with no surrounding erythema or drainage  Back: No costovertebral angle tenderness, no midline C, T or L spine tenderness  Neuro: Grossly nonfocal, normal speech, strength equal bilaterally, CN 2-12 intact  Extremities: No deformities, equal radial and DP pulses. No clubbing, cyanosis.  No edema  Skin: Warm and dry with no rash.       Diagnostics     Lab Results   Labs Ordered and Resulted from Time of ED Arrival to Time of ED Departure   COMPREHENSIVE METABOLIC PANEL - Abnormal       Result Value    Sodium 132 (*)     Potassium 4.2      Carbon Dioxide (CO2) 27      Anion Gap 12      Urea Nitrogen 31.7 (*)     Creatinine 7.47 (*)     GFR Estimate 7 (*)     Calcium 8.8      Chloride 93 (*)     Glucose 170 (*)     Alkaline Phosphatase 243 (*)     AST 33      ALT 17       Protein Total 5.8 (*)     Albumin 2.6 (*)     Bilirubin Total 0.3     CBC WITH PLATELETS AND DIFFERENTIAL - Abnormal    WBC Count 7.9      RBC Count 3.82 (*)     Hemoglobin 11.9 (*)     Hematocrit 36.5 (*)     MCV 96      MCH 31.2      MCHC 32.6      RDW 19.1 (*)     Platelet Count 273      % Neutrophils 69      % Lymphocytes 13      % Monocytes 8      % Eosinophils 8      % Basophils 1      % Immature Granulocytes 1      NRBCs per 100 WBC 0      Absolute Neutrophils 5.5      Absolute Lymphocytes 1.1      Absolute Monocytes 0.6      Absolute Eosinophils 0.7      Absolute Basophils 0.0      Absolute Immature Granulocytes 0.1      Absolute NRBCs 0.0     INR - Abnormal    INR 2.29 (*)    ISTAT GASES LACTATE VENOUS POCT - Abnormal    Lactic Acid POCT 1.2      Bicarbonate Venous POCT 30 (*)     O2 Sat, Venous POCT 22 (*)     pCO2 Venous POCT 47      pH Venous POCT 7.40      pO2 Venous POCT 16 (*)     Base Excess/Deficit (+/-) POCT 4.0 (*)    PROCALCITONIN - Normal    Procalcitonin 0.34         Imaging   XR Chest 2 Views   Final Result   IMPRESSION: Trace bilateral pleural effusions. No focal consolidation   or pneumothorax. Similar cardiomediastinal silhouette. Remote right   rib fractures.      Small volume pneumoperitoneum as seen on the lateral view, similar to   prior CT and probably from known peritoneal dialysis catheter.   Clinically correlate.      SOURAV LEE MD            SYSTEM ID:  [id number]          EKG   ECG taken at 1049, ECG read at 1058  Sinus tachycardia with occasional Premature ventricular complexes   Left axis deviation   Inferior infarct (cited on or before 27-Nov-2024)   Anterolateral infarct (cited on or before 27-Nov-2024)   Abnormal ECG    Rate 106 bpm. DE interval 190 ms. QRS duration 98 ms. QT/QTc 342/454 ms. P-R-T axes 112 -70 81.    Independent Interpretation   CXR: No pneumothorax or infiltrate.    ED Course      Medications Administered   Medications   sodium chloride 0.9% BOLUS  500 mL (500 mLs Intravenous $New Bag 12/5/24 1129)   midodrine (PROAMATINE) tablet 2.5 mg (2.5 mg Oral $Given 12/5/24 1127)         Discussion of Management   1304 I spoke with Hannah Christie, hospitalist NILSA, who accepts the patient for Dr. Palmer.    ED Course   ED Course as of 12/05/24 1306   u Dec 05, 2024   1048 I obtained history and examined the patient as noted above   1306 I rechecked the patient and explained findings. He is comfortable with admission.       Additional Documentation  None    Medical Decision Making / Diagnosis     CMS Diagnoses: None    MIPS       None    MDM   Arnulfo Pritchett is a 64 year old male history of CHF, coronary artery disease and peritoneal dialysis who presents to the emergency department from cardiology clinic where he was had evening routine hospital follow-up.  Patient was noted to be hypotensive in the 70s despite oral fluids.  Patient states that he has been continuing to have hypotension and dizziness and lightheadedness since discharge.  Patient had been receiving as needed midodrine in the hospital but to his knowledge has not been receiving it at rehab.      Undertaken as noted above.  No signs or symptoms to suggest sepsis or infection as the cause of his symptoms.  Chest x-ray is clear with no infiltrate or pneumothorax.  He was found to be symptomatic on orthostatic vital signs.  Received 500 cc of fluid with improvement of his blood pressure as well as a dose of midodrine.  Do not suspect other cause of hypotension rather suspect hypovolemia and medication related.  Cardiology clinic note states patient needs to stop Entresto likely.  He will need nephrology involvement to further evaluate fluid status and goals.  Patient's blood pressures have improved at this time.  He is reluctantly agreeable to stay in hospital tonight to have cardiology consultation, nephrology consultation and ensure an medical optimization of his blood pressure and volume status.  Patient  previously required blood transfusion on previous admission but has a stable hemoglobin at this time.  Would not start antibiotics as no signs of severe sepsis.  Lactic acid was within normal limits and procalcitonin is negative.  Discussed with the hospitalist and all questions and concerns of the patient were addressed.    Disposition   The patient was admitted to the hospital.     Diagnosis     ICD-10-CM    1. Orthostatic hypotension  I95.1       2. Hypovolemia  E86.1                Scribe Disclosure:  Yunior DYER, am serving as a scribe at 10:54 AM on 12/5/2024 to document services personally performed by Jennifer Loera MD based on my observations and the provider's statements to me.        Jennifer Loera MD  12/05/24 [id number]

## 2024-12-05 NOTE — PROGRESS NOTES
PRIMARY Concern: hypotension  SAFETY RISK Concerns (fall risk, behaviors, etc.): fall      Aggression Tool Color: green  Isolation/Type: none  Tests/Procedures for NEXT shift: peritoneal dialysis; monitor BPs  Consults? (Pending/following, signed-off?) Nephrology following, cardiology consult pending. Woc consult pending.   Where is patient from? (Home, TCU, etc.): TCU, [place]  Other Important info for NEXT shift: peritoneal dialysis to start at 9 PM per patient preference  Anticipated DC date & active delays: Tomorrow pending consults  _____________________________________________________________________________  SUMMARY NOTE:  Orientation/Cognitive: A/O x4  Observation Goals (Met/ Not Met): not met  Mobility Level/Assist Equipment: SBA  Antibiotics & Plan (IV/po, length of tx left): topical gentamicin prophylactic with PD  Pain Management: denies pain, even with dressing changes  Tele/VS/O2: vitally stable on RA ex tachy  ABNL Lab/BG: see results   Diet: renal   Bowel/Bladder: cont. Using urinal  Skin Concerns: wounds to L inner leg, groin and L great toe, outter L foot suspected deep pressure injury, R heel pressure injury (?) woc to see  Drains/Devices: PIV, penitential dialysis port  Patient Stated Goal for Today: rest

## 2024-12-05 NOTE — ED NOTES
[facility]  ED Nurse Handoff Report    ED Chief complaint: Hypotension      ED Diagnosis:   Final diagnoses:   None       Code Status: Provider to assess    Allergies: No Known Allergies    Patient Story: Arnulfo Pritchett is a 64 year old male with a history significant for CKD, Afib, CHF, CAD, Diabetes, STEMI, CVA, Hypertension and Hyperlipidemia who presents for evaluation of Hypotension. Pt presents from heart clinic where he was found to have low blood pressure, 70/30. Pt notes mild dizziness when walking this am, this has resolved. Pt has history of peritoneal dialysis with port in LLQ, which is done nightly.  Focused Assessment:    Constitutional: Pleasant gentleman  Respiratory: Regular rate and depth. No shortness of breath noted on exam   Cardiac: NSR   Neurologic: A&Ox4   Abdomen: Peritoneal dialysis port LLQ     Treatments and/or interventions provided:   Labs Ordered and Resulted from Time of ED Arrival to Time of ED Departure   CBC WITH PLATELETS AND DIFFERENTIAL - Abnormal       Result Value    WBC Count 7.9      RBC Count 3.82 (*)     Hemoglobin 11.9 (*)     Hematocrit 36.5 (*)     MCV 96      MCH 31.2      MCHC 32.6      RDW 19.1 (*)     Platelet Count 273      % Neutrophils 69      % Lymphocytes 13      % Monocytes 8      % Eosinophils 8      % Basophils 1      % Immature Granulocytes 1      NRBCs per 100 WBC 0      Absolute Neutrophils 5.5      Absolute Lymphocytes 1.1      Absolute Monocytes 0.6      Absolute Eosinophils 0.7      Absolute Basophils 0.0      Absolute Immature Granulocytes 0.1      Absolute NRBCs 0.0     INR - Abnormal    INR 2.29 (*)    ISTAT GASES LACTATE VENOUS POCT - Abnormal    Lactic Acid POCT 1.2      Bicarbonate Venous POCT 30 (*)     O2 Sat, Venous POCT 22 (*)     pCO2 Venous POCT 47      pH Venous POCT 7.40      pO2 Venous POCT 16 (*)     Base Excess/Deficit (+/-) POCT 4.0 (*)    COMPREHENSIVE METABOLIC PANEL   PROCALCITONIN       XR Chest 2 Views   Final  Result   IMPRESSION: Trace bilateral pleural effusions. No focal consolidation   or pneumothorax. Similar cardiomediastinal silhouette. Remote right   rib fractures.      Small volume pneumoperitoneum as seen on the lateral view, similar to   prior CT and probably from known peritoneal dialysis catheter.   Clinically correlate.      SOURAV LEE MD            SYSTEM ID:  [id number]          Medications   sodium chloride 0.9% BOLUS 500 mL (500 mLs Intravenous $New Bag 12/5/24 1129)   midodrine (PROAMATINE) tablet 2.5 mg (2.5 mg Oral $Given 12/5/24 1127)           Patient's response to treatments and/or interventions: Tolerated well    To be done/followed up on inpatient unit:  Per provider order    Does this patient have any cognitive concerns?:  N/A    Activity level - Baseline/Home:  Independent  Activity Level - Current:   Stand with Assist    Patient's Preferred language: English   Needed?: No    Isolation: None  Infection: Not Applicable  Patient tested for COVID 19 prior to admission: NO  Bariatric?: No    Vital Signs:   Vitals:    12/05/24 1130 12/05/24 1200 12/05/24 1207 12/05/24 1210   BP: (!) 117/92   (!) 143/102   Pulse: 104 96  95   Resp: 10 18  21   Temp:       SpO2: 96% 97%  97%   Weight:   79.4 kg (175 lb 1.6 oz)    Height:   1.829 m (6')        Cardiac Rhythm:          OBS brochure/video discussed/provided to patient/family: N/A          For the majority of the shift this patient's behavior was Green.   Behavioral interventions performed were N/A.    ED NURSE PHONE NUMBER: [phone]

## 2024-12-05 NOTE — ED NOTES
Bed: ED09  Expected date:   Expected time:   Means of arrival:   Comments:  Triage BP 70s at clinic asymptomatic

## 2024-12-05 NOTE — CONSULTS
[facility]    Nephrology Consultation     Date of Admission:  12/5/2024    Assessment & Plan     Arnulfo Pritchett is a 64 year old male who was admitted on 12/5/2024.     Assessment:  1) end-stage renal disease on chronic PD  Chronic PD for last 3.5 years.  Home unit [facility], nephrologist Dr. May  Rx: 5 cycles, 2 L fill volumes, 9 hours, last fill 1.2 L with external.  Target weight reported at 79 kg.  Has had significant weight loss over the last number of months.     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  Admission H&P documents blood pressure this morning 136/90 this morning at his TCU.    3) known ASCVD, HFrEF, recent coronary angiogram with no interventions done.  Had LVEDP of 7 last week at time of heart cath.    Other:  PAD, status post left lower extremity revascularization 10/25/2024       Plan/Recs:  1) plan to drain now patient's last fill of extraneal/icodextrin to prevent further ultrafiltration  2) cycle to be set up tonight, plan for 1.5% dextrose solutions which will not have any ultrafiltration effect.  3) would hold torsemide inpatient and on discharge at this admission  4) likely okay for continue Entresto with hypertension.  5) okay for as needed isotonic fluids in small boluses with hypotension    Duncan Lomeli DO  [facility] - Nephrology  [phone]  --------------------------------------------------------------------------------------------  Reason for Consult     I was asked to see the patient for ESRD management    History is obtained from the patient and chart review.      History of Present Illness     Arnulfo Pritchett is a 64 year old male who presents today from cardiology clinic with  "hypotension.  Known to nephrology service from recent admissions.  Recent mission with orthostasis.  Had some hypotension which responded to some volume expansion, use of all 1.5 dextrose solutions on PD which had no ultrafiltration associated with.  While hospitalized he had his torsemide and Entresto held but this was resumed on discharge and thus got a dose likely yesterday morning and this morning as well.  Last admission midodrine was ordered at 2.5 mg as needed in the morning for dizziness or hypotension after dialysis but this was never given.  He still had some orthostatic vitals which did delay last hospitalization discharge.    Patient seen in the ED.  Frustrated that he is back in the hospital.  Reports  He will \"not come back for any appointments\".  Was in cardiology clinic this morning for some hypotension.  Sent to the ED where he was given 500 cc normal saline bolus.  Reported vitals in cardiac clinic in the 70 systolic, first in the ER at 117/92 and when seen he was hypertensive at 152 systolic.  Reports PD was uneventful last 2 nights.  He is with his last fill of Extraneal currently.  As before, he reports he does not know the numbers regarding drain volumes.  Denies any chest pain shortness of breath.    Past Medical History   I have reviewed this patient's medical history and updated it with pertinent information if needed.   Past Medical History:   Diagnosis Date    CAD (coronary artery disease)     Chronic systolic heart failure (H)     ESRD (end stage renal disease) on dialysis (H)     Gastroesophageal reflux disease without esophagitis     Gout     HLD (hyperlipidemia)     TALI (obstructive sleep apnea)     PAD (peripheral artery disease) (H)     S/p RLE stenting of SFA/popliteal arteries    Type 2 diabetes mellitus with diabetic neuropathy (H)        Past Surgical History   I have reviewed this patient's surgical history and updated it with pertinent information if needed.  Past Surgical " History:   Procedure Laterality Date    AMPUTATE TOE(S) Left 10/27/2024    Procedure: AMPUTATION, TOE left great toe;  Surgeon: Haley Joseph DPM, Podiatry/Foot and Ankle Surgery;  Location: [facility] OR    BYPASS GRAFT FEMOROTIBIAL Left 10/25/2024    Procedure: LEFT FEMORAL ENDARTERECTOMY, LEFT FEMORAL TO TIBIAL PERONEAL TRUNK BYPASS WITH LEFT GREAT SEPHANEOUS VEIN, WOUND VAC PLACEMENT ON LEFT GROIN.;  Surgeon: Raul Gray MD;  Location: [facility] OR    BYPASS GRAFT FEMOROTIBIAL Left 10/27/2024    Procedure: CREATION, BYPASS, VASCULAR, FEMORAL TO TIBIAL REVISION OF BYPASS LEFT COMMON FEMORAL TO TIBIAL.;  Surgeon: Raul Gray MD;  Location: [facility] OR    CV CORONARY ANGIOGRAM N/A 11/27/2024    Procedure: Coronary Angiogram;  Surgeon: Clem Matt MD;  Location:  HEART CARDIAC CATH LAB    CV LOWER EXTREMITY ANGIOGRAM LEFT Left 10/27/2024    Procedure: Angiogram Lower Extremity Left;  Surgeon: Raul Gray MD;  Location: [facility] OR    CV PCI N/A 11/27/2024    Procedure: Percutaneous Coronary Intervention;  Surgeon: Clem Matt MD;  Location:  HEART CARDIAC CATH LAB    IR LOWER EXTREMITY ANGIOGRAM LEFT  10/17/2024       Prior to Admission Medications   Prior to Admission Medications   Prescriptions Last Dose Informant Patient Reported? Taking?   B Complex-C-Folic Acid (DIALYVITE) TABS  Self Yes No   Sig: Take 1 tablet by mouth daily.   SEVELAMER CARBONATE PO  Self Yes No   Sig: Take 800 mg by mouth 3 times daily (with meals)   acetaminophen (TYLENOL) 500 MG tablet  Self Yes No   Sig: Take 1,000 mg by mouth every 8 hours as needed.   allopurinol (ZYLOPRIM) 100 MG tablet  Self Yes No   Sig: Take 200 mg by mouth every evening   atorvastatin (LIPITOR) 40 MG tablet  Self Yes No   Sig: Take 40 mg by mouth at bedtime.   calcitRIOL (ROCALTROL) 0.25 MCG capsule  Self Yes No   Sig: Take 0.25 mcg by mouth at bedtime.   carvedilol (COREG) 3.125 MG tablet   No No   Sig: Take 1 tablet (3.125 mg)  by mouth 2 times daily (with meals).   cinacalcet (SENSIPAR) 60 MG tablet  Self Yes No   Sig: Take 60 mg by mouth. Take 1 tablet (60 mg) three times a week: Monday, Wednesday, and Friday nights   clopidogrel (PLAVIX) 75 MG tablet   Yes No   Sig: Take 75 mg by mouth every evening.   gentamicin (GARAMYCIN) 0.1 % external cream  Self Yes No   Sig: Apply topically daily as needed. Apply topically on peritoneal access site to prevent infections   glucose 40 % (400 mg/mL) gel   No No   Sig: Take 15-30 g by mouth every 15 minutes as needed for low blood sugar.   Patient taking differently: Take 35 g by mouth every 15 minutes as needed for low blood sugar.   insulin glargine (LANTUS PEN) 100 UNIT/ML pen   No No   Sig: Inject 20 Units subcutaneously at bedtime.   melatonin 5 MG tablet  Self Yes No   Sig: Take 5 mg by mouth at bedtime   midodrine (PROAMATINE) 2.5 MG tablet   No No   Sig: Take 1 tablet (2.5 mg) by mouth once as needed (hypotension/dizziness post PD in AM during the day).   Patient not taking: Reported on 12/5/2024   omeprazole (PRILOSEC) 20 MG DR capsule   Yes No   Sig: Take 20 mg by mouth daily.   oxyCODONE (ROXICODONE) 5 MG tablet   No No   Sig: Take 1 tablet (5 mg) by mouth every 6 hours as needed for severe pain.   polyethylene glycol (MIRALAX) 17 g packet   Yes No   Sig: Take 1 packet by mouth daily.   sacubitril-valsartan (ENTRESTO) 49-51 MG per tablet   No No   Sig: Take 0.5 tablets by mouth 2 times daily.   senna-docusate (SENOKOT-S/PERICOLACE) 8.6-50 MG tablet   Yes No   Sig: Take 1 tablet by mouth daily as needed for constipation.   torsemide (DEMADEX) 100 MG tablet   No No   Sig: Take 1 tablet (100 mg) by mouth every morning.   warfarin ANTICOAGULANT (COUMADIN) 3 MG tablet   Yes No   Sig: Take 3 mg by mouth five times a week. Take 3 mg Mon, Thu, Fri, Sat and Sun   warfarin ANTICOAGULANT (COUMADIN) 4 MG tablet   Yes No   Sig: Take 4 mg by mouth twice a week. Takes 4 mg Tue and Wed       Facility-Administered Medications: None     Allergies   No Known Allergies    Social History   I have reviewed this patient's social history and updated it with pertinent information if needed. Arnulfo Pritchett  reports that he has quit smoking. His smoking use included cigarettes. He has never used smokeless tobacco. He reports that he does not currently use alcohol. He reports that he does not use drugs.    Family History   I have reviewed this patient's family history and updated it with pertinent information if needed.   No family history on file.    Review of Systems   The 10 point Review of Systems is negative other than noted in the HPI.     Physical Exam   Temp: 97.3  F (36.3  C)   BP: (!) 134/98 Pulse: 115   Resp: 16 SpO2: 99 % O2 Device: None (Room air)    Vital Signs with Ranges  Temp:  [97.3  F (36.3  C)] 97.3  F (36.3  C)  Pulse:  [] 115  Resp:  [10-22] 16  BP: ()/() 134/98  SpO2:  [93 %-99 %] 99 %  175 lbs 1.6 oz    GENERAL: Frail in appearance, in no distress  HEENT:  Normocephalic. No gross abnormalities.  Pupils equal.  CV: RRR, 1/6 systolic murmur noted, no peripheral edema present   RESP: Clear bilaterally with good efforts  GI: Abdomen soft/nt/nd, BS normal.  PD catheter present.  MUSCULOSKELETAL: extremities nl - no gross deformities noted  SKIN: no suspicious lesions or rashes, dry to touch  NEURO: Gross nonfocal  PSYCH: mood good, affect appropriate    Data   BMP  Recent Labs   Lab 12/05/24  1058 12/03/24  1152 12/03/24  0729 12/03/24  0536 11/30/24  0935 11/30/24  0545 11/29/24  1314 11/29/24  1126   *  --   --  135  --  131*  --  134*   POTASSIUM 4.2  --   --  3.5  --  3.9  --  4.4   CHLORIDE 93*  --   --  99  --  96*  --  96*   TERENCE 8.8  --   --  8.4*  --  8.3*  --  8.4*   CO2 27  --   --  26  --  25  --  26   BUN 31.7*  --   --  31.4*  --  37.0*  --  38.2*   CR 7.47*  --   --  7.39*  --  7.48*  --  7.66*   * 78 61* 81   < > 162*   < > 80    < > = values in this  "interval not displayed.     [email or web address](phos[facility]  CBC)  Recent Labs   Lab 12/05/24  1058 12/03/24  0536 11/30/24  0545   WBC 7.9 6.6 5.9   HGB 11.9* 9.8* 10.2*   HCT 36.5* 29.7* 31.6*   MCV 96 94 95    232 250     Recent Labs   Lab 12/05/24  1058   AST 33   ALT 17   ALKPHOS 243*   BILITOTAL 0.3     Recent Labs   Lab 12/05/24  1058   INR 2.29*     No results found for: \"D2VIT\", \"D3VIT\", \"DTOT\"  Recent Labs   Lab 12/05/24  1058   HGB 11.9*   HCT 36.5*   MCV 96     No results for input(s): \"PTHI\" in the last 168 hours.    "

## 2024-12-05 NOTE — CONSULTS
[facility]    CARDIOLOGY CONSULTATION       Date of Admission:  12/5/2024    Assessment & Plan       Symptomatic hypotension, improving with fluids  Chronic HFrEF (LVEF 30% on 11/2024 TTE), appears euvolemic/hypovolemic  Ischemic cardiomyopathy  CAD s/p RCA PCI in 2017 and LCx/OM PCI in 2021,   Most recent angiogram (11/2024): Stable moderate-severe stenoses of the proximal RCA and mid-distal LCx (likely both flow-limiting), currently medically managed due to lack of angina and noncardiac comorbidities (transfusion dependent anemia, etc.)  Severe PAD  Paroxysmal AF, currently in sinus rhythm  ESRD on peritoneal dialysis  History of CVA  TALI on CPAP      RECOMMENDATIONS:    Agree with fluid resuscitation as has been done, currently appears roughly euvolemic.  Agree with nephrology consult for appropriate management of volume with peritoneal dialysis; appreciate assistance  No obvious infection playing a role at present given normal WBC and procalcitonin.  Regarding GDMT, he unfortunately is not tolerating this well.  For now, continue carvedilol 3.125 mg twice daily.  We will try to gently titrate this up as tolerated.  He does not appear to tolerate Entresto well.  May consider lisinopril/losartan at low-dose in the future, but again GDMT options appear fairly limited.  Continue Coumadin and Plavix for now  Hold off on RCA, LCx/OM PCI at this time and continue to medically manage unless driven by angina      Thank you for the interesting consult.  Cardiology will continue to follow.          High complexity     Clem Matt MD        Reason for Consult   Reason for consult: Patient is being evaluated for symptomatic hypotension.    History of Present Illness   Arnulfo Pritchett is a 64 year old male with a complex medical history, transferred from clinic to ER earlier today due to symptomatic hypotension.  Please see the excellent note from Kylie Boateng PA-C, for full details,  but briefly he has a history of CAD with inferior STEMI in 2017 s/p RCA PCI, PCI of the OM and LCx in 2021, and most recent coronary angiogram last month showing moderate-severe disease in the proximal RCA and mid-distal LCx which were both likely flow-limiting, but stable in appearance, and given lack of angina and numerous other complicating factors such as recurrent hypotension, severe anemia requiring transfusion, etc., PCI was deferred.  This angiogram occurred during his most recent hospitalization for which he was just discharged 2 days ago.  During hospitalization, it was recommended that he was supposed to stop his Entresto, but per his rehab paperwork, he still had Entresto on his medication list.    When he arrived to clinic earlier this morning, he was severely hypotensive with intermittent lightheadedness, and initial SBP was 75/50, which did not significantly improve with oral fluids.  He accordingly was transferred to the ER.  While there, he was given fluids and a single dose of midodrine, and BP has responded, currently up to 134/98.  He currently denies any chest pains or shortness of breath, and his lightheadedness has resolved.    Labs on admission reviewed.  ECG with no major acute ischemic changes; sinus tachycardia with occasional PVCs and both inferior Q waves and poor R wave progression in the precordial leads.  I also reviewed his 11/2024 TTE and his 11/2024 angiogram.      Prior to Admission Medications   Prior to Admission Medications   Prescriptions Last Dose Informant Patient Reported? Taking?   B Complex-C-Folic Acid (DIALYVITE) TABS  Self Yes No   Sig: Take 1 tablet by mouth daily.   SEVELAMER CARBONATE PO  Self Yes No   Sig: Take 800 mg by mouth 3 times daily (with meals)   acetaminophen (TYLENOL) 500 MG tablet  Self Yes No   Sig: Take 1,000 mg by mouth every 8 hours as needed.   allopurinol (ZYLOPRIM) 100 MG tablet  Self Yes No   Sig: Take 200 mg by mouth every evening    atorvastatin (LIPITOR) 40 MG tablet  Self Yes No   Sig: Take 40 mg by mouth at bedtime.   calcitRIOL (ROCALTROL) 0.25 MCG capsule  Self Yes No   Sig: Take 0.25 mcg by mouth at bedtime.   carvedilol (COREG) 3.125 MG tablet   No No   Sig: Take 1 tablet (3.125 mg) by mouth 2 times daily (with meals).   cinacalcet (SENSIPAR) 60 MG tablet  Self Yes No   Sig: Take 60 mg by mouth. Take 1 tablet (60 mg) three times a week: Monday, Wednesday, and Friday nights   clopidogrel (PLAVIX) 75 MG tablet   Yes No   Sig: Take 75 mg by mouth every evening.   gentamicin (GARAMYCIN) 0.1 % external cream  Self Yes No   Sig: Apply topically daily as needed. Apply topically on peritoneal access site to prevent infections   glucose 40 % (400 mg/mL) gel   No No   Sig: Take 15-30 g by mouth every 15 minutes as needed for low blood sugar.   Patient taking differently: Take 35 g by mouth every 15 minutes as needed for low blood sugar.   insulin glargine (LANTUS PEN) 100 UNIT/ML pen   No No   Sig: Inject 20 Units subcutaneously at bedtime.   melatonin 5 MG tablet  Self Yes No   Sig: Take 5 mg by mouth at bedtime   midodrine (PROAMATINE) 2.5 MG tablet   No No   Sig: Take 1 tablet (2.5 mg) by mouth once as needed (hypotension/dizziness post PD in AM during the day).   Patient not taking: Reported on 12/5/2024   omeprazole (PRILOSEC) 20 MG DR capsule   Yes No   Sig: Take 20 mg by mouth daily.   oxyCODONE (ROXICODONE) 5 MG tablet   No No   Sig: Take 1 tablet (5 mg) by mouth every 6 hours as needed for severe pain.   polyethylene glycol (MIRALAX) 17 g packet   Yes No   Sig: Take 1 packet by mouth daily.   sacubitril-valsartan (ENTRESTO) 49-51 MG per tablet   No No   Sig: Take 0.5 tablets by mouth 2 times daily.   senna-docusate (SENOKOT-S/PERICOLACE) 8.6-50 MG tablet   Yes No   Sig: Take 1 tablet by mouth daily as needed for constipation.   torsemide (DEMADEX) 100 MG tablet   No No   Sig: Take 1 tablet (100 mg) by mouth every morning.   warfarin  ANTICOAGULANT (COUMADIN) 3 MG tablet   Yes No   Sig: Take 3 mg by mouth five times a week. Take 3 mg Mon, Thu, Fri, Sat and Sun   warfarin ANTICOAGULANT (COUMADIN) 4 MG tablet   Yes No   Sig: Take 4 mg by mouth twice a week. Takes 4 mg Tue and Wed      Facility-Administered Medications: None     Current Facility-Administered Medications   Medication Dose Route Frequency Provider Last Rate Last Admin    allopurinol (ZYLOPRIM) tablet 200 mg  200 mg Oral QPM Hannah Christie PA-C        atorvastatin (LIPITOR) tablet 40 mg  40 mg Oral At Bedtime Hannah Christie PA-C        calcitRIOL (ROCALTROL) capsule 0.25 mcg  0.25 mcg Oral At Bedtime Hannah Christie PA-C        carvedilol (COREG) tablet 3.125 mg  3.125 mg Oral BID w/meals Hannah Christie PA-C        [START ON 12/6/2024] cinacalcet (SENSIPAR) tablet 60 mg  60 mg Oral Q Mon Wed Fri AM Hannah Christie PA-C        clopidogrel (PLAVIX) tablet 75 mg  75 mg Oral QPM JamesusaHannah leyva PA-C        glucose gel 15-30 g  15-30 g Oral Q15 Min PRN Hannah Christie PA-C        Or    dextrose 50 % injection 25-50 mL  25-50 mL Intravenous Q15 Min PRN Hannah Christie PA-C        Or    glucagon injection 1 mg  1 mg Subcutaneous Q15 Min PRN Hannah Christie PA-C        gentamicin (GARAMYCIN) 0.1 % ointment   Topical Daily PRN Ang Palmer MD        insulin aspart (NovoLOG) injection (RAPID ACTING)  1-7 Units Subcutaneous TID AC Hannah Christie PA-C        insulin aspart (NovoLOG) injection (RAPID ACTING)  1-5 Units Subcutaneous At Bedtime Hannah Christie PA-C        insulin glargine (LANTUS PEN) injection 20 Units  20 Units Subcutaneous At Bedtime Hannah Christie PA-C        omeprazole (PriLOSEC) CR capsule 20 mg  20 mg Oral Daily Hannah Christie PA-C        ondansetron (ZOFRAN ODT) ODT tab 4 mg  4 mg Oral Q6H PRN Hannah Christie PA-C        Or     ondansetron (ZOFRAN) injection 4 mg  4 mg Intravenous Q6H PRN Hannah Christie PA-C        oxyCODONE (ROXICODONE) tablet 5 mg  5 mg Oral Q6H PRN Hannah Christie PA-C        prochlorperazine (COMPAZINE) injection 5 mg  5 mg Intravenous Q6H PRN Hannah Christie PA-C        Or    prochlorperazine (COMPAZINE) tablet 5 mg  5 mg Oral Q6H PRN Hannah Christie PA-C        senna-docusate (SENOKOT-S/PERICOLACE) 8.6-50 MG per tablet 1 tablet  1 tablet Oral BID PRN Hannah Christie PA-C        Or    senna-docusate (SENOKOT-S/PERICOLACE) 8.6-50 MG per tablet 2 tablet  2 tablet Oral BID PRN Hannah Christie PA-C        sevelamer carbonate (RENVELA) tablet 800 mg  800 mg Oral TID w/meals Hannah Christie PA-C        Warfarin Dose Required Daily - Pharmacist Managed  1 each Oral See Admin Instructions Hannah Christie PA-C         Current Facility-Administered Medications   Medication Dose Route Frequency Provider Last Rate Last Admin    allopurinol (ZYLOPRIM) tablet 200 mg  200 mg Oral QPM Hannah Christie PA-C        atorvastatin (LIPITOR) tablet 40 mg  40 mg Oral At Bedtime Hannah Christie PA-C        calcitRIOL (ROCALTROL) capsule 0.25 mcg  0.25 mcg Oral At Bedtime Hannah Christie PA-C        carvedilol (COREG) tablet 3.125 mg  3.125 mg Oral BID w/meals Hannah Christie PA-C        [START ON 12/6/2024] cinacalcet (SENSIPAR) tablet 60 mg  60 mg Oral Q Mon Wed Fri AM Hannah Christie PA-C        clopidogrel (PLAVIX) tablet 75 mg  75 mg Oral QPM Hannah Christie PA-C        glucose gel 15-30 g  15-30 g Oral Q15 Min PRN Hannah Christie PA-C        Or    dextrose 50 % injection 25-50 mL  25-50 mL Intravenous Q15 Min PRN Hannah Christie PA-C        Or    glucagon injection 1 mg  1 mg Subcutaneous Q15 Min PRN Hannah Christie PA-C        gentamicin (GARAMYCIN) 0.1 % ointment    Topical Daily PRN Ang Palmer MD        insulin aspart (NovoLOG) injection (RAPID ACTING)  1-7 Units Subcutaneous TID AC Hannah Christie PA-C        insulin aspart (NovoLOG) injection (RAPID ACTING)  1-5 Units Subcutaneous At Bedtime Hannah Christie PA-C        insulin glargine (LANTUS PEN) injection 20 Units  20 Units Subcutaneous At Bedtime Hannah Christie PA-C        omeprazole (PriLOSEC) CR capsule 20 mg  20 mg Oral Daily Hannah Christie PA-C        ondansetron (ZOFRAN ODT) ODT tab 4 mg  4 mg Oral Q6H PRN Hannah Christie PA-C        Or    ondansetron (ZOFRAN) injection 4 mg  4 mg Intravenous Q6H PRN Hannah Christie PA-C        oxyCODONE (ROXICODONE) tablet 5 mg  5 mg Oral Q6H PRN Hannah Christie PA-C        prochlorperazine (COMPAZINE) injection 5 mg  5 mg Intravenous Q6H PRN Hannah Christie PA-C        Or    prochlorperazine (COMPAZINE) tablet 5 mg  5 mg Oral Q6H PRN Hannah Christie PA-C        senna-docusate (SENOKOT-S/PERICOLACE) 8.6-50 MG per tablet 1 tablet  1 tablet Oral BID PRN Hannah Christie PA-C        Or    senna-docusate (SENOKOT-S/PERICOLACE) 8.6-50 MG per tablet 2 tablet  2 tablet Oral BID PRN Hannah Christie PA-C        sevelamer carbonate (RENVELA) tablet 800 mg  800 mg Oral TID w/meals Hannah Christie PA-C        Warfarin Dose Required Daily - Pharmacist Managed  1 each Oral See Admin Instructions Hannah Christie PA-C         Allergies   No Known Allergies      Physical Exam   Vital Signs with Ranges  Temp:  [97.3  F (36.3  C)] 97.3  F (36.3  C)  Pulse:  [] 115  Resp:  [10-22] 16  BP: ()/() 134/98  SpO2:  [93 %-99 %] 99 %  Wt Readings from Last 4 Encounters:   12/05/24 79.4 kg (175 lb 1.6 oz)   12/05/24 79.4 kg (175 lb 1.6 oz)   12/03/24 80 kg (176 lb 5.9 oz)   11/26/24 76.2 kg (168 lb)     No intake/output data recorded.      Vitals: BP (!)  134/98 (BP Location: Left arm, Patient Position: Semi-Soto's)   Pulse 115   Temp 97.3  F (36.3  C)   Resp 16   Ht 1.829 m (6')   Wt 79.4 kg (175 lb 1.6 oz)   SpO2 99%   BMI 23.75 kg/m      Physical Exam:   Constitutional: No acute distress  Respiratory: Normal respiratory effort, CTAB  Cardiovascular: RRR, borderline tachycardic, no m/r/g.  JVP < 7 cm H2O.  There is no significant LE edema.  Normal carotid upstrokes, no carotid bruits.        Clinically Significant Risk Factors Present on Admission         # Hyponatremia: Lowest Na = 132 mmol/L in last 2 days, will monitor as appropriate  # Hypochloremia: Lowest Cl = 93 mmol/L in last 2 days, will monitor as appropriate      # Hypoalbuminemia: Lowest albumin = 2.6 g/dL at 12/5/2024 10:58 AM, will monitor as appropriate  # Drug Induced Coagulation Defect: home medication list includes an anticoagulant medication  # Drug Induced Platelet Defect: home medication list includes an antiplatelet medication   # Hypertension: Noted on problem list               # Financial/Environmental Concerns:          Cardiac Arrhythmia: Atrial fibrillation: Paroxysmal  Cardiomyopathy  Peripheral vascular disease (PVD)  Systolic chronic    hyponatremia and Hypo-osmolality    CKD POA List: ESRD on dialysis    Pulmonary Heart Disease (Pulmonary hypertension or Cor pulmonale): Pulmonary Hypertension, unspecified    Chronic Fatigue and Other Debilities: Age-related physical debility

## 2024-12-05 NOTE — PHARMACY-ADMISSION MEDICATION HISTORY
Pharmacist Admission Medication History    Admission medication history is complete. The information provided in this note is only as accurate as the sources available at the time of the update.    Information Source(s): Facility (U/NH/) medication list/MAR and CareEverywhere/SureScripts via N/A    Pertinent Information: MAR faxed from [employer] [facility] ([phone])    Changes made to PTA medication list:  Added: None  Deleted: None  Changed:   Lantus 20units at bedtime --> 35 units  Entresto 49-51mg 0.5tab BID --> 1 tab    Allergies reviewed with patient and updates made in EHR: unable to assess    Medication History Completed By: Enedina Sunshine RPH 12/5/2024 5:22 PM    PTA Med List   Medication Sig Last Dose/Taking    acetaminophen (TYLENOL) 500 MG tablet Take 1,000 mg by mouth every 8 hours as needed. Taking As Needed    allopurinol (ZYLOPRIM) 100 MG tablet Take 200 mg by mouth every evening 12/4/2024 Evening    atorvastatin (LIPITOR) 40 MG tablet Take 40 mg by mouth at bedtime. 12/4/2024 Bedtime    B Complex-C-Folic Acid (DIALYVITE) TABS Take 1 tablet by mouth daily. 12/5/2024 at  7:00 AM    calcitRIOL (ROCALTROL) 0.25 MCG capsule Take 0.25 mcg by mouth at bedtime. 12/4/2024 at  9:00 PM    carvedilol (COREG) 3.125 MG tablet Take 1 tablet (3.125 mg) by mouth 2 times daily (with meals). 12/5/2024 at  8:00 AM    cinacalcet (SENSIPAR) 60 MG tablet Take 60 mg by mouth. Take 1 tablet (60 mg) three times a week: Monday, Wednesday, and Friday nights 12/4/2024 Evening    clopidogrel (PLAVIX) 75 MG tablet Take 75 mg by mouth every evening. 12/4/2024 at  8:00 PM    gentamicin (GARAMYCIN) 0.1 % external cream Apply topically daily as needed. Apply topically on peritoneal access site to prevent infections Taking As Needed    glucose 40 % (400 mg/mL) gel Take 15-30 g by mouth every 15 minutes as needed for low blood sugar. (Patient taking differently: Take 35 g by mouth every 15 minutes as needed for low blood  sugar.) Taking Differently    insulin glargine (LANTUS PEN) 100 UNIT/ML pen Inject 20 Units subcutaneously at bedtime. (Patient taking differently: Inject 35 Units subcutaneously at bedtime.) 12/4/2024 Bedtime    melatonin 5 MG tablet Take 5 mg by mouth at bedtime 12/4/2024 at  9:00 PM    midodrine (PROAMATINE) 2.5 MG tablet Take 1 tablet (2.5 mg) by mouth once as needed (hypotension/dizziness post PD in AM during the day). Taking As Needed    omeprazole (PRILOSEC) 20 MG DR capsule Take 20 mg by mouth daily. 12/4/2024 Morning    oxyCODONE (ROXICODONE) 5 MG tablet Take 1 tablet (5 mg) by mouth every 6 hours as needed for severe pain. Taking As Needed    polyethylene glycol (MIRALAX) 17 g packet Take 1 packet by mouth daily. 12/4/2024 Morning    sacubitril-valsartan (ENTRESTO) 49-51 MG per tablet Take 0.5 tablets by mouth 2 times daily. (Patient taking differently: Take 1 tablet by mouth 2 times daily.) 12/5/2024 at  8:00 AM    senna-docusate (SENOKOT-S/PERICOLACE) 8.6-50 MG tablet Take 1 tablet by mouth daily as needed for constipation. Taking As Needed    SEVELAMER CARBONATE PO Take 800 mg by mouth 3 times daily (with meals) 12/5/2024 Morning    torsemide (DEMADEX) 100 MG tablet Take 1 tablet (100 mg) by mouth every morning. 12/5/2024 at  7:00 AM    warfarin ANTICOAGULANT (COUMADIN) 3 MG tablet Take 3 mg by mouth five times a week. On Mon, Thu, Fri, Sat, Sun 12/3/2024 Evening    warfarin ANTICOAGULANT (COUMADIN) 4 MG tablet Take 4 mg by mouth twice a week. On Tue and Wed 12/4/2024 Evening

## 2024-12-05 NOTE — LETTER
12/5/2024    Chai Griffin MD  [address] [facility] [address]    RE: Arnulfo Pritchett       Dear Colleague,     I had the pleasure of seeing Arnulfo Pritchett in the [facility].  Primary Cardiologist: Dr. Spears (previously with [facility])    Reason for Visit: Hospital follow up    History of Present Illness:   Arnulfo is a very pleasant 64 year old male who was admitted on 11/27/2024 directly from vascular surgery clinic due to significant hypotension with systolic blood pressure in the 60s.  He has past medical history notable for CAD (history of inferior STEMI in 2017 requiring PCI to the RCA; history of PCI to the OM and LCx in 2021; most recent ischemic evaluation with repeat coronary angiogram during his recent admission which showed severe two-vessel coronary artery disease, proximal RCA 70% stenosed and mid circumflex to distal circumflex 80% stenosed--given lack of angina and other acute events conservative approach was recommended; on aspirin, statin, and BB), Severe PAD (hx of left femoral endarterectomy and bovine pericardial patch angioplasty, left distal common femoral arterial/proximal superficial femoral artery and tibioperoneal trunk bypass 2024; hx of SFA popliteal balloon angioplasty and stenting in May 2024; hx of ligation of side branches of the left saphenous vein and temporary closure of the right groin with wound VAC October 2024; Ischemic great left toe status post amputation October 27, 2024  ), ESRD (hx of RCC with nephrectomy; on peritoneal dialysis; on torsemide 100 mg daily; diuretics and fluid status has been managed by nephrology), chronic severe HFrEF/ischemic cardiomyopathy (titration of GDMT has been challenging due to low blood pressures), T2DM, history of CVA, anemia of chronic disease, TALI (compliant with CPAP), GERD, and persistent atrial fibrillation (hx of ablation; rate control; on warfarin).     During his most recent admission due to profound hypotension,  blood pressure/GDMT medications were held while pressors (including dobutamine) initiated. At the time of his Left heart catheterization, his LVEDP was only 11 mmHg. His hypotension was felt to be not cardiogenic shock but more from hypovolemia/dehydration. He required blood transfusions as well due to Hgb antonio of ~7. Cardiology had recommended discontinuing dobutamine and slowly re instituting pta GDMT medications (carvedilol and Entresto). He was discharged to [facility]. It appears he was still symptomatic from hypotension and was having positive orthostatics on the day of discharge. Of note, he was admitted prior to this in 10/2024 with critical limb ischemia.    He presents to clinic today stating he is feeling lightheaded and presyncopal. He threw up his breakfast this morning. He still took his morning medications including Entresto and Carvedilol. He denies peripheral edema, orthopnea, PND, bleeding issues, or chest discomfort. He denies feeling lightheaded yesterday.     Assessment and Plan:  Arnulfo is a very pleasant 64 year old male with complex past medical history. He presents today about 3 days after his discharge for hospital follow up. His recent hypotension was felt to be related to hypovolemia rather than true cardiac cause. His cardiac work up was stable. He is not sure if has nephrology follow up. He is intermittently lightheaded during clinic visit with request to lay down. After some water intake his lightheadedness is partially better but his SBP remains in the 70's. Per his rehab medication list he is on Entresto and Carvedilol. I feel Entresto will need to be discontinued as he is not able to tolerate this. Nephrology will need to be involved making further recommendations on volume management. Given persistent symptomatic hypotension, patient will be escorted to the ED today for expedited care.     His past medical history as follows-     # Symptomatic Hypotension  -- recheck BP shows  persistent Systolic in the 70's    # CAD  -- most recent cor angio several weeks ago showed stable 2-vessel disease  -- no reports of angina today    # HFrEF/Ischemic CM  -- does not appears hypervolemic  -- per rehab notes he is back on entresto and carvedilol  -- entresto will likely be discontinued    # Severe PAD  -- long standing history - details above    # ESRD, with Peritoneal dialysis     # Hx of CVA    # TALI compliant with CPAP    # GERD    # Persistent atrial fibrillation  -- HR's stable    Given symptomatic hypotension he was escorted to the ED for expedited care.     45 minutes spent on the date of the encounter with chart review, patient visit, care coordination, and documentation.    The longitudinal plan of care for the diagnose(s)/condition(s) as documented were addressed during this visit. Due to the added complexity in care, I will continue to support Arnulfo Pritchett  in the subsequent management and with ongoing continuity of care.     This note was completed in part using Dragon voice recognition software. Although reviewed after completion, some word and grammatical errors may occur.    Orders this Visit:  No orders of the defined types were placed in this encounter.    No orders of the defined types were placed in this encounter.    There are no discontinued medications.      No diagnosis found.    CURRENT MEDICATIONS:  Current Outpatient Medications   Medication Sig Dispense Refill     acetaminophen (TYLENOL) 500 MG tablet Take 1,000 mg by mouth every 8 hours as needed.       allopurinol (ZYLOPRIM) 100 MG tablet Take 200 mg by mouth every evening       atorvastatin (LIPITOR) 40 MG tablet Take 40 mg by mouth at bedtime.       B Complex-C-Folic Acid (DIALYVITE) TABS Take 1 tablet by mouth daily.       calcitRIOL (ROCALTROL) 0.25 MCG capsule Take 0.25 mcg by mouth at bedtime.       carvedilol (COREG) 3.125 MG tablet Take 1 tablet (3.125 mg) by mouth 2 times daily (with meals).       cinacalcet  (SENSIPAR) 60 MG tablet Take 60 mg by mouth. Take 1 tablet (60 mg) three times a week: Monday, Wednesday, and Friday nights       clopidogrel (PLAVIX) 75 MG tablet Take 75 mg by mouth every evening.       gentamicin (GARAMYCIN) 0.1 % external cream Apply topically daily as needed. Apply topically on peritoneal access site to prevent infections       glucose 40 % (400 mg/mL) gel Take 15-30 g by mouth every 15 minutes as needed for low blood sugar. (Patient taking differently: Take 35 g by mouth every 15 minutes as needed for low blood sugar.)       insulin glargine (LANTUS PEN) 100 UNIT/ML pen Inject 20 Units subcutaneously at bedtime.       melatonin 5 MG tablet Take 5 mg by mouth at bedtime       omeprazole (PRILOSEC) 20 MG DR capsule Take 20 mg by mouth daily.       oxyCODONE (ROXICODONE) 5 MG tablet Take 1 tablet (5 mg) by mouth every 6 hours as needed for severe pain. 10 tablet 0     polyethylene glycol (MIRALAX) 17 g packet Take 1 packet by mouth daily.       sacubitril-valsartan (ENTRESTO) 49-51 MG per tablet Take 0.5 tablets by mouth 2 times daily.       senna-docusate (SENOKOT-S/PERICOLACE) 8.6-50 MG tablet Take 1 tablet by mouth daily as needed for constipation.       SEVELAMER CARBONATE PO Take 800 mg by mouth 3 times daily (with meals)       torsemide (DEMADEX) 100 MG tablet Take 1 tablet (100 mg) by mouth every morning.       warfarin ANTICOAGULANT (COUMADIN) 3 MG tablet Take 3 mg by mouth five times a week. Take 3 mg Mon, Thu, Fri, Sat and Sun       warfarin ANTICOAGULANT (COUMADIN) 4 MG tablet Take 4 mg by mouth twice a week. Takes 4 mg Tue and Wed       midodrine (PROAMATINE) 2.5 MG tablet Take 1 tablet (2.5 mg) by mouth once as needed (hypotension/dizziness post PD in AM during the day). (Patient not taking: Reported on 12/5/2024)         ALLERGIES   No Known Allergies    PAST MEDICAL HISTORY:  Past Medical History:   Diagnosis Date     CAD (coronary artery disease)      Chronic systolic heart  failure (H)      ESRD (end stage renal disease) on dialysis (H)      Gastroesophageal reflux disease without esophagitis      Gout      HLD (hyperlipidemia)      TALI (obstructive sleep apnea)      PAD (peripheral artery disease) (H)     S/p RLE stenting of SFA/popliteal arteries     Type 2 diabetes mellitus with diabetic neuropathy (H)        PAST SURGICAL HISTORY:  Past Surgical History:   Procedure Laterality Date     AMPUTATE TOE(S) Left 10/27/2024    Procedure: AMPUTATION, TOE left great toe;  Surgeon: Haley Joseph DPM, Podiatry/Foot and Ankle Surgery;  Location: [facility] OR     BYPASS GRAFT FEMOROTIBIAL Left 10/25/2024    Procedure: LEFT FEMORAL ENDARTERECTOMY, LEFT FEMORAL TO TIBIAL PERONEAL TRUNK BYPASS WITH LEFT GREAT SEPHANEOUS VEIN, WOUND VAC PLACEMENT ON LEFT GROIN.;  Surgeon: Raul Gray MD;  Location: [facility] OR     BYPASS GRAFT FEMOROTIBIAL Left 10/27/2024    Procedure: CREATION, BYPASS, VASCULAR, FEMORAL TO TIBIAL REVISION OF BYPASS LEFT COMMON FEMORAL TO TIBIAL.;  Surgeon: Raul Gray MD;  Location:  OR     CV CORONARY ANGIOGRAM N/A 11/27/2024    Procedure: Coronary Angiogram;  Surgeon: Clem Matt MD;  Location:  HEART CARDIAC CATH LAB     CV LOWER EXTREMITY ANGIOGRAM LEFT Left 10/27/2024    Procedure: Angiogram Lower Extremity Left;  Surgeon: Raul Gray MD;  Location:  OR     CV PCI N/A 11/27/2024    Procedure: Percutaneous Coronary Intervention;  Surgeon: Clem Matt MD;  Location:  HEART CARDIAC CATH LAB     IR LOWER EXTREMITY ANGIOGRAM LEFT  10/17/2024       FAMILY HISTORY:  No family history on file.    SOCIAL HISTORY:  Social History     Socioeconomic History     Marital status:    Tobacco Use     Smoking status: Former     Types: Cigarettes     Smokeless tobacco: Never   Vaping Use     Vaping status: Never Used   Substance and Sexual Activity     Alcohol use: Not Currently     Comment: quit 20 years     Drug use: Never      Social Drivers of Health     Financial Resource Strain: Low Risk  (11/30/2024)    Financial Resource Strain      Within the past 12 months, have you or your family members you live with been unable to get utilities (heat, electricity) when it was really needed?: No   Food Insecurity: Low Risk  (11/30/2024)    Food Insecurity      Within the past 12 months, did you worry that your food would run out before you got money to buy more?: No      Within the past 12 months, did the food you bought just not last and you didn t have money to get more?: No   Transportation Needs: Low Risk  (11/30/2024)    Transportation Needs      Within the past 12 months, has lack of transportation kept you from medical appointments, getting your medicines, non-medical meetings or appointments, work, or from getting things that you need?: No   Physical Activity: Unknown (10/8/2024)    Exercise Vital Sign      Days of Exercise per Week: 0 days   Stress: No Stress Concern Present (10/8/2024)    [employer] - Occupational Stress Questionnaire      Feeling of Stress : Only a little   Social Connections: Unknown (10/8/2024)    Social Connection and Isolation Panel [NHANES]      Frequency of Social Gatherings with Friends and Family: Patient declined   Interpersonal Safety: Low Risk  (11/28/2024)    Interpersonal Safety      Do you feel physically and emotionally safe where you currently live?: Yes      Within the past 12 months, have you been hit, slapped, kicked or otherwise physically hurt by someone?: No      Within the past 12 months, have you been humiliated or emotionally abused in other ways by your partner or ex-partner?: No   Housing Stability: Low Risk  (11/30/2024)    Housing Stability      Do you have housing? : Yes      Are you worried about losing your housing?: No       Review of Systems:  Skin:        Eyes:       ENT:       Respiratory:       Cardiovascular:       Gastroenterology:     "  Genitourinary:       Musculoskeletal:       Neurologic:       Psychiatric:       Heme/Lymph/Imm:       Endocrine:         Physical Exam:  Vitals: BP (!) 75/50   Pulse 52   Resp 16   Ht 1.829 m (6')   Wt 79.4 kg (175 lb 1.6 oz)   SpO2 93%   BMI 23.75 kg/m       GEN:  In moderate distress. Thin and frail appearing. Pale.  NECK: No JVD  C/V:  Regular rate and rhythm, no murmur, rub or gallop.  RESP: Clear to auscultation bilaterally.  GI: Abdomen soft, nontender, nondistended.   EXTREM: No pitting LE edema.   NEURO: Alert and oriented, cooperative. No obvious focal deficits.   PSYCH: Normal affect.      Recent Lab Results:  LIPID RESULTS:  No results found for: \"CHOL\", \"HDL\", \"LDL\", \"TRIG\", \"CHOLHDLRATIO\"    LIVER ENZYME RESULTS:  No results found for: \"AST\", \"ALT\"    CBC RESULTS:  Lab Results   Component Value Date    WBC 6.6 12/03/2024    RBC 3.16 (L) 12/03/2024    HGB 9.8 (L) 12/03/2024    HCT 29.7 (L) 12/03/2024    MCV 94 12/03/2024    MCH 31.0 12/03/2024    MCHC 33.0 12/03/2024    RDW 18.9 (H) 12/03/2024     12/03/2024       BMP RESULTS:  Lab Results   Component Value Date     12/03/2024    POTASSIUM 3.5 12/03/2024    CHLORIDE 99 12/03/2024    CO2 26 12/03/2024    ANIONGAP 10 12/03/2024    GLC 78 12/03/2024    BUN 31.4 (H) 12/03/2024    CR 7.39 (H) 12/03/2024    GFRESTIMATED 8 (L) 12/03/2024    TERENCE 8.4 (L) 12/03/2024        A1C RESULTS:  Lab Results   Component Value Date    A1C 5.7 (H) 10/16/2024       INR RESULTS:  Lab Results   Component Value Date    INR 2.39 (H) 12/03/2024    INR 1.92 (H) 12/02/2024             Kylie Boateng PA-C  December 5, 2024       Thank you for allowing me to participate in the care of your patient.      Sincerely,     Kylie Boateng PA-C     [facility]  cc:   Alejo Spears MD  [address] [place] [id number]      "

## 2024-12-05 NOTE — H&P
[facility]    History and Physical - Hospitalist Service       Date of Admission:  12/5/2024    Assessment & Plan   Arnulfo Pritchett is a 64 year old male with PMHx of coronary artery disease with multiple stents, hypertension, hyperlipidemia, chronic paroxysmal A-fib (s/p ablation), ESRD on PD, secondary hyperparathyroidism, RCC (s/p nephrectomy), DM type II, gout, GERD, TALI who was recently admitted to the hospital from 10/15 - 11/9 for critical limb ischemia of the left lower extremity and ischemic left great toe wound, and again from 11/27/24-12/3/24 for persistent hypotension with initial concern for cardiogenic shock, but repeat coronary angiogram showed stable disease. Pt was briefly treated with dobumatine. He was followed by Nephrology and Cardiology. His peritoneal dialysis fluids and cardiac meds were adjusted. He was felt to be hypovolemic. He was seen outpatient by Cardiology this AM and noted SBPs in the 70s and symptomatic. Directed to the ED where he received fluids and one dose of midodrine with improvement. Registered to observation for further evaluation and treatment.      Severe, symptomatic orthostatic hypotension: Coreg was reduced from 6.25 mg po BID to 3.125 mg BID and pt received 12/2 and 12/3. Note that PTA, pt was also on Entresto 49-51 mg BID which was reduced to 0.5 tab BID and Torsemide 100 mg po every day, both of these meds were held during his most recent hospitalization, but resumed at discharge and he [facility] He was also discharged with PRN midodrine which he has not needed as BPs have been stable at TCU. BP this /90 per discussion with his TCU RN.   *Lactic 1.2, Hgb 11.9, WBC WNL. Pt received 500 ml bolus and midodrine 2.5 mg in the ED with improvement in hypotension, now hypertensive.   - [place] to observation   - Nephrology consulted, appreciate input. Note fluids adjusted last hospitalization, see  note by Dr. Lomeli from 12/3   - Cardiology consulted, appreciate input regarding cardiac meds  - Continue Coreg with hold parameters  - Hold Entresto and Torsemide      History of coronary artery disease with multiple stents (last intervention in 2021)  Chronic severe HFrEF, ischemic cardiomyopathy, not in acute exacerbation (20-25%):   *History of inferior STEMI in 2017 requiring PCI to the RCA; history of PCI to the OM and LCx in 2021; most recent ischemic evaluation with repeat coronary angiogram during his recent admission which showed severe two-vessel coronary artery disease, proximal RCA 70% stenosed and mid circumflex to distal circumflex 80% stenosed--given lack of angina and other acute events conservative approach was recommended; on aspirin, statin, and BB.   *Echo 6/2024 showed LVEF 20-25%, segmental wall motion globally hypokinetic.   *Nuc stress test 10/22 was abnormal but no evidence of ischemia; medium sized area of infarction in the entire inferolateral segment(s) of the left ventricle extending into basal inferior segment; small area of nontransmural infarction in the apical segment(s) of the left ventricle; TID absent; left ventricular ejection fraction at stress 26%   -  Continue BB, statin, plavix once verified      Chronic atrial fibrillation, rate controlled   Chronic anticoagulation use with coumadin: Hx of ablation.   - Continue BB, pharmacy to dose coumadin      Recent admission from 10/15 - 11/9 with Critical limb ischemia of the left lower extremity   S/P left femoral endarterectomy with bovine pericardial patch angioplasty; left distal common femoral artery/proximal superficial femoral artery to tibioperoneal trunk bypass; and temporary closure of left groin wound with wound VAC 10/25/2024.  S/P ligation of side branches of the left great saphenous vein and temporary closure of right groin with application of wound VAC 10/27/2024.  Ischemic left great toe wound S/P amputation of left  great toe 10/27/2024.  Left groin wound infection.  Hx of acute RLE arterial occlusion s/p SFA popliteal balloon angioplasty and stenting (5/2024)  - Wound care consulted for L groin and leg incision, toe wound cares   - Continue Plavix and statin as above      ESRD secondary to diabetes mellitus nephropathy on PD  Secondary hyperparathyroidism  Hx RCC s/p nephrectomy   -Nephrology consulted, due to timing of transfer up to the floor, no peritoneal dialysis staff to set patient up for 12/5 evening, but will plan for 12/6 if remains in house. This was discussed with patient      T2DM, insulin dependent, controlled:           Hemoglobin A1C   Date Value Ref Range Status   10/16/2024 5.7 (H) <5.7 % Final       Comment:       Normal <5.7%   Prediabetes 5.7-6.4%    Diabetes 6.5% or higher     Note: Adopted from ADA consensus guidelines.   [Lantus 20 U qhs]  - Hold PTA oral agents at this time   - Continue Lantus 20 U qhs   - Medium sliding scale insulin ordered  - BS per protocol   - Monitor for hypoglycemia              Recent Labs   Lab 12/05/24  1058 12/03/24  1152 12/03/24  0729 12/03/24  0536 12/03/24  0156 12/02/24  2111   * 78 61* 81 94 152*         Chronic, stable medical conditions: Resume meds as appropriate   Anemia of chronic disease  GERD: Continue PPI  TALI : Intolerant to CPAP, follow-up in sleep clinic as outpatient  Gout: Continue allopurinol  GERD      Observation Goals: -vital signs normal or at patient baseline, no recurrent hypotension , -Nephrology consult , -CC consult to return to [facility] TCU , Nurse to notify provider when observation goals have been met and patient is ready for discharge.  Diet: Renal Diet (dialysis)    DVT Prophylaxis: Warfarin  Rosario Catheter: Not present  Lines: None     Cardiac Monitoring: None  Code Status: Full Code      Clinically Significant Risk Factors Present on Admission         # Hyponatremia: Lowest Na = 132 mmol/L in last 2 days, will monitor as  appropriate  # Hypochloremia: Lowest Cl = 93 mmol/L in last 2 days, will monitor as appropriate      # Hypoalbuminemia: Lowest albumin = 2.6 g/dL at 12/5/2024 10:58 AM, will monitor as appropriate  # Drug Induced Coagulation Defect: home medication list includes an anticoagulant medication  # Drug Induced Platelet Defect: home medication list includes an antiplatelet medication   # Hypertension: Noted on problem list               # Financial/Environmental Concerns:           Disposition Plan     Medically Ready for Discharge: Anticipated Tomorrow pending stability in BP       The patient's care was discussed with the Attending Physician, Dr. MUNA Palmer, Bedside Nurse, and Patient.    Hannah Christie PA-C  Hospitalist Service  [facility]  Securely message with [employer] (more info)  Text page via [email or web address] Paging/Directory     ______________________________________________________________________    Chief Complaint   Hypotension    History is obtained from the patient and extensive chart review.     History of Present Illness   Arnulfo Pritchett is a 64 year old male with PMHx of coronary artery disease with multiple stents, hypertension, hyperlipidemia, chronic paroxysmal A-fib (s/p ablation), ESRD on PD, secondary hyperparathyroidism, RCC (s/p nephrectomy), DM type II, gout, GERD, TALI who was recently admitted to the hospital from 10/15 - 11/9 for critical limb ischemia of the left lower extremity and ischemic left great toe wound, and again from 11/27/24-12/3/24 for persistent hypotension with initial concern for cardiogenic shock, but repeat coronary angiogram showed stable disease. Pt was briefly treated with dobumatine. He was followed by Nephrology and Cardiology. His peritoneal dialysis fluids and cardiac meds were adjusted. He was felt to be hypovolemic. He was seen outpatient by Cardiology this AM and noted SBPs in the 70s and symptomatic. Directed to the ED where he received  fluids and one dose of midodrine with improvement. Registered to observation for further evaluation and treatment.     Per chart review, Coreg was reduced from 6.25 mg po BID to 3.125 mg BID and pt received 12/2 and 12/3. Note that PTA, pt was also on Entresto 49-51 mg BID which was reduced to 0.5 tab BID and Torsemide 100 mg po every day, both of these meds were held during his most recent hospitalization, but resumed at discharge and he [facility] He was also discharged with PRN midodrine which he has not needed as BPs have been stable at TCU. BP this /90 per discussion with his TCU RN.     In the ED, pt was seen by Dr. Loera. Lactic 1.2, Hgb 11.9, WBC WNL. Pt received 500 ml bolus and midodrine 2.5 mg in the ED with improvement in hypotension, now hypertensive.     Discussed case with Dr. Lomeli. He has already adjusted peritoneal fluid components. Agrees with holding torsemide and entresto as above. Due to timing of transfer to the floor and peritoneal nursing staff issues, pt will not receive peritoneal dialysis tonight. Pt aware of this.     Pt denies recent illness, N/V/D since discharge. Appetite has been good. No chest pain. Some dizziness with hypotension. No SOB.     Past Medical History    Past Medical History:   Diagnosis Date    CAD (coronary artery disease)     Chronic systolic heart failure (H)     ESRD (end stage renal disease) on dialysis (H)     Gastroesophageal reflux disease without esophagitis     Gout     HLD (hyperlipidemia)     TALI (obstructive sleep apnea)     PAD (peripheral artery disease) (H)     S/p RLE stenting of SFA/popliteal arteries    Type 2 diabetes mellitus with diabetic neuropathy (H)        Past Surgical History   Past Surgical History:   Procedure Laterality Date    AMPUTATE TOE(S) Left 10/27/2024    Procedure: AMPUTATION, TOE left great toe;  Surgeon: Haley Joseph DPM, Podiatry/Foot and Ankle Surgery;  Location:  OR     BYPASS GRAFT FEMOROTIBIAL Left 10/25/2024    Procedure: LEFT FEMORAL ENDARTERECTOMY, LEFT FEMORAL TO TIBIAL PERONEAL TRUNK BYPASS WITH LEFT GREAT SEPHANEOUS VEIN, WOUND VAC PLACEMENT ON LEFT GROIN.;  Surgeon: Raul Gray MD;  Location: [facility] OR    BYPASS GRAFT FEMOROTIBIAL Left 10/27/2024    Procedure: CREATION, BYPASS, VASCULAR, FEMORAL TO TIBIAL REVISION OF BYPASS LEFT COMMON FEMORAL TO TIBIAL.;  Surgeon: Raul Gray MD;  Location: [facility] OR    CV CORONARY ANGIOGRAM N/A 11/27/2024    Procedure: Coronary Angiogram;  Surgeon: Clem Matt MD;  Location:  HEART CARDIAC CATH LAB    CV LOWER EXTREMITY ANGIOGRAM LEFT Left 10/27/2024    Procedure: Angiogram Lower Extremity Left;  Surgeon: Raul Gray MD;  Location: [facility] OR    CV PCI N/A 11/27/2024    Procedure: Percutaneous Coronary Intervention;  Surgeon: Clem Matt MD;  Location:  HEART CARDIAC CATH LAB    IR LOWER EXTREMITY ANGIOGRAM LEFT  10/17/2024       Prior to Admission Medications   Prior to Admission Medications   Prescriptions Last Dose Informant Patient Reported? Taking?   B Complex-C-Folic Acid (DIALYVITE) TABS  Self Yes No   Sig: Take 1 tablet by mouth daily.   SEVELAMER CARBONATE PO  Self Yes No   Sig: Take 800 mg by mouth 3 times daily (with meals)   acetaminophen (TYLENOL) 500 MG tablet  Self Yes No   Sig: Take 1,000 mg by mouth every 8 hours as needed.   allopurinol (ZYLOPRIM) 100 MG tablet  Self Yes No   Sig: Take 200 mg by mouth every evening   atorvastatin (LIPITOR) 40 MG tablet  Self Yes No   Sig: Take 40 mg by mouth at bedtime.   calcitRIOL (ROCALTROL) 0.25 MCG capsule  Self Yes No   Sig: Take 0.25 mcg by mouth at bedtime.   carvedilol (COREG) 3.125 MG tablet   No No   Sig: Take 1 tablet (3.125 mg) by mouth 2 times daily (with meals).   cinacalcet (SENSIPAR) 60 MG tablet  Self Yes No   Sig: Take 60 mg by mouth. Take 1 tablet (60 mg) three times a week: Monday, Wednesday, and Friday nights    clopidogrel (PLAVIX) 75 MG tablet   Yes No   Sig: Take 75 mg by mouth every evening.   gentamicin (GARAMYCIN) 0.1 % external cream  Self Yes No   Sig: Apply topically daily as needed. Apply topically on peritoneal access site to prevent infections   glucose 40 % (400 mg/mL) gel   No No   Sig: Take 15-30 g by mouth every 15 minutes as needed for low blood sugar.   Patient taking differently: Take 35 g by mouth every 15 minutes as needed for low blood sugar.   insulin glargine (LANTUS PEN) 100 UNIT/ML pen   No No   Sig: Inject 20 Units subcutaneously at bedtime.   melatonin 5 MG tablet  Self Yes No   Sig: Take 5 mg by mouth at bedtime   midodrine (PROAMATINE) 2.5 MG tablet   No No   Sig: Take 1 tablet (2.5 mg) by mouth once as needed (hypotension/dizziness post PD in AM during the day).   Patient not taking: Reported on 12/5/2024   omeprazole (PRILOSEC) 20 MG DR capsule   Yes No   Sig: Take 20 mg by mouth daily.   oxyCODONE (ROXICODONE) 5 MG tablet   No No   Sig: Take 1 tablet (5 mg) by mouth every 6 hours as needed for severe pain.   polyethylene glycol (MIRALAX) 17 g packet   Yes No   Sig: Take 1 packet by mouth daily.   sacubitril-valsartan (ENTRESTO) 49-51 MG per tablet   No No   Sig: Take 0.5 tablets by mouth 2 times daily.   senna-docusate (SENOKOT-S/PERICOLACE) 8.6-50 MG tablet   Yes No   Sig: Take 1 tablet by mouth daily as needed for constipation.   torsemide (DEMADEX) 100 MG tablet   No No   Sig: Take 1 tablet (100 mg) by mouth every morning.   warfarin ANTICOAGULANT (COUMADIN) 3 MG tablet   Yes No   Sig: Take 3 mg by mouth five times a week. Take 3 mg Mon, Thu, Fri, Sat and Sun   warfarin ANTICOAGULANT (COUMADIN) 4 MG tablet   Yes No   Sig: Take 4 mg by mouth twice a week. Takes 4 mg Tue and Wed      Facility-Administered Medications: None        Review of Systems    The 10 point Review of Systems is negative other than noted in the HPI.    Social History   I have reviewed this patient's social history  and updated it with pertinent information if needed.  Social History     Tobacco Use    Smoking status: Former     Types: Cigarettes    Smokeless tobacco: Never   Vaping Use    Vaping status: Never Used   Substance Use Topics    Alcohol use: Not Currently     Comment: quit 20 years    Drug use: Never     Family History   Reviewed and non-contributory to current chief complaint.     Allergies   No Known Allergies     Physical Exam   Vital Signs: Temp: 97.3  F (36.3  C)   BP: (!) 134/98 Pulse: 115   Resp: 16 SpO2: 99 % O2 Device: None (Room air)    Weight: 175 lbs 1.6 oz    CONSTITUTIONAL: Pt laying in bed, dressed in hospital garb. Appears comfortable. Cooperative with interview.   HEENT: Normocephalic, atraumatic.   CARDIOVASCULAR: RRR, no murmurs, rubs, or extra heart sounds appreciated. Pulses +2/4 and regular in upper and lower extremities, bilaterally.   RESPIRATORY: No increased work of breathing. CTA, bilat; no wheezes, rales, or rhonchi appreciated.  GASTROINTESTINAL:  Abdomen soft, non-distended. BS auscultated in all four quadrants. Negative for tenderness to palpation.   MUSCULOSKELETAL: No gross deformities noted. Normal muscle tone.   HEMATOLOGIC/LYMPHATIC/IMMUNOLOGIC: Negative for lower extremity edema, bilaterally.  NEUROLOGIC: Alert and oriented to person, place, and time. No focal neuro deficits.   SKIN: Warm, dry, intact. L leg wound C/D/I.     Medical Decision Making       75 MINUTES SPENT BY ME on the date of service doing chart review, history, exam, documentation & further activities per the note.      Data     I have personally reviewed the following data over the past 24 hrs:    7.9  \   11.9 (L)   / 273     132 (L) 93 (L) 31.7 (H) /  170 (H)   4.2 27 7.47 (H) \     ALT: 17 AST: 33 AP: 243 (H) TBILI: 0.3   ALB: 2.6 (L) TOT PROTEIN: 5.8 (L) LIPASE: N/A     Procal: 0.34 CRP: N/A Lactic Acid: 1.2       INR:  2.29 (H) PTT:  N/A   D-dimer:  N/A Fibrinogen:  N/A       Imaging results reviewed over  the past 24 hrs:   Recent Results (from the past 24 hours)   XR Chest 2 Views    Narrative    CHEST TWO VIEWS 12/5/2024 11:47 AM     HISTORY: hypotension    COMPARISON: Chest radiograph 11/27/2024. CTA abdomen and pelvis  10/17/2024.       Impression    IMPRESSION: Trace bilateral pleural effusions. No focal consolidation  or pneumothorax. Similar cardiomediastinal silhouette. Remote right  rib fractures.    Small volume pneumoperitoneum as seen on the lateral view, similar to  prior CT and probably from known peritoneal dialysis catheter.  Clinically correlate.    SOURAV LEE MD         SYSTEM ID:  [id number]

## 2024-12-05 NOTE — TELEPHONE ENCOUNTER
Pt is scheduled for a [facility] appt on 12/05/24.      Pt with a history of systolic heart failure..  Medication list reviewed and pt is not currently on Entresto, Jardiance, and Farxiga.    Please initiate coverage check for the above medications.  Janay Astudillo, JOSE  9:45 AM 12/5/2024                Patient had dual coverage for pharmacy benefits with Medicare/[facility]     Entresto covered 100%   Jardiance covered 100%     Farxiga not covered and will require a prior auth.     Thank you for allowing me to help with your patient   [facility]   Pharmacy Discharge Liaison   [facility]     Disclaimer: Pharmacy test claims are estimates and may not reflect final costs. Suggested alternatives aim to be cost-effective but may not be therapeutically equivalent as this consult is informational and does not constitute medical advice. Clinical decisions should be made by qualified healthcare providers.

## 2024-12-05 NOTE — PROGRESS NOTES
Cycler set up per protocol and per treatment orders      Results from instilled icodextran day dwell from home  Effluent color and clarity: clear yellow effluent resulted with no fibrin tissue noted  Total UF: 1900ml manually drained       Cycler Serial Number: [id number]  Total Treatment Volume: 10,000mL  Total treatment time:  9hrs  Fill Volume: 2,000ml  Last Fill Volume: 0ml  Heater ba,000 mL;  Lot #: [id number] ;  Expiration Date: 2026  Side Bag #1:  6,000 mL;  Lot #: [id number] ;  Expiration Date: 2026  Cassette: Lot#: [id number], Expiration Date:      Number of cycles including final fill:   Dwell Time: 1:22 minutes  Last Fill Volume: 0ml  Initial Drain Alarm: 10ml  PD orders reviewed with Patient procedure and ESRD teaching done and questions answered.  Cycler ready for hook-up.    Report given to: RASHARD Vaughan, RN

## 2024-12-06 LAB
ANION GAP SERPL CALCULATED.3IONS-SCNC: 9 MMOL/L (ref 7–15)
BASOPHILS # BLD AUTO: 0 10E3/UL (ref 0–0.2)
BASOPHILS NFR BLD AUTO: 1 %
BUN SERPL-MCNC: 29.3 MG/DL (ref 8–23)
CALCIUM SERPL-MCNC: 8 MG/DL (ref 8.8–10.4)
CHLORIDE SERPL-SCNC: 98 MMOL/L (ref 98–107)
CREAT SERPL-MCNC: 7.1 MG/DL (ref 0.67–1.17)
EGFRCR SERPLBLD CKD-EPI 2021: 8 ML/MIN/1.73M2
EOSINOPHIL # BLD AUTO: 0.6 10E3/UL (ref 0–0.7)
EOSINOPHIL NFR BLD AUTO: 10 %
ERYTHROCYTE [DISTWIDTH] IN BLOOD BY AUTOMATED COUNT: 19 % (ref 10–15)
GLUCOSE BLDC GLUCOMTR-MCNC: 131 MG/DL (ref 70–99)
GLUCOSE BLDC GLUCOMTR-MCNC: 139 MG/DL (ref 70–99)
GLUCOSE BLDC GLUCOMTR-MCNC: 171 MG/DL (ref 70–99)
GLUCOSE BLDC GLUCOMTR-MCNC: 89 MG/DL (ref 70–99)
GLUCOSE SERPL-MCNC: 116 MG/DL (ref 70–99)
HCO3 SERPL-SCNC: 27 MMOL/L (ref 22–29)
HCT VFR BLD AUTO: 31.6 % (ref 40–53)
HGB BLD-MCNC: 10.3 G/DL (ref 13.3–17.7)
IMM GRANULOCYTES # BLD: 0 10E3/UL
IMM GRANULOCYTES NFR BLD: 1 %
INR PPP: 2.59 (ref 0.85–1.15)
LYMPHOCYTES # BLD AUTO: 1 10E3/UL (ref 0.8–5.3)
LYMPHOCYTES NFR BLD AUTO: 17 %
MCH RBC QN AUTO: 30.9 PG (ref 26.5–33)
MCHC RBC AUTO-ENTMCNC: 32.6 G/DL (ref 31.5–36.5)
MCV RBC AUTO: 95 FL (ref 78–100)
MONOCYTES # BLD AUTO: 0.6 10E3/UL (ref 0–1.3)
MONOCYTES NFR BLD AUTO: 10 %
NEUTROPHILS # BLD AUTO: 3.8 10E3/UL (ref 1.6–8.3)
NEUTROPHILS NFR BLD AUTO: 63 %
NRBC # BLD AUTO: 0 10E3/UL
NRBC BLD AUTO-RTO: 0 /100
PLATELET # BLD AUTO: 236 10E3/UL (ref 150–450)
POTASSIUM SERPL-SCNC: 3.5 MMOL/L (ref 3.4–5.3)
RBC # BLD AUTO: 3.33 10E6/UL (ref 4.4–5.9)
SODIUM SERPL-SCNC: 134 MMOL/L (ref 135–145)
WBC # BLD AUTO: 6.1 10E3/UL (ref 4–11)

## 2024-12-06 PROCEDURE — G0463 HOSPITAL OUTPT CLINIC VISIT: HCPCS

## 2024-12-06 PROCEDURE — 258N000003 HC RX IP 258 OP 636: Performed by: INTERNAL MEDICINE

## 2024-12-06 PROCEDURE — 82962 GLUCOSE BLOOD TEST: CPT

## 2024-12-06 PROCEDURE — 85004 AUTOMATED DIFF WBC COUNT: CPT | Performed by: PHYSICIAN ASSISTANT

## 2024-12-06 PROCEDURE — 99232 SBSQ HOSP IP/OBS MODERATE 35: CPT | Performed by: INTERNAL MEDICINE

## 2024-12-06 PROCEDURE — 250N000013 HC RX MED GY IP 250 OP 250 PS 637: Performed by: INTERNAL MEDICINE

## 2024-12-06 PROCEDURE — 250N000013 HC RX MED GY IP 250 OP 250 PS 637: Performed by: PHYSICIAN ASSISTANT

## 2024-12-06 PROCEDURE — 85610 PROTHROMBIN TIME: CPT | Performed by: PHYSICIAN ASSISTANT

## 2024-12-06 PROCEDURE — 36415 COLL VENOUS BLD VENIPUNCTURE: CPT | Performed by: PHYSICIAN ASSISTANT

## 2024-12-06 PROCEDURE — 90945 DIALYSIS ONE EVALUATION: CPT

## 2024-12-06 PROCEDURE — 80048 BASIC METABOLIC PNL TOTAL CA: CPT | Performed by: PHYSICIAN ASSISTANT

## 2024-12-06 PROCEDURE — 250N000011 HC RX IP 250 OP 636: Performed by: PHYSICIAN ASSISTANT

## 2024-12-06 PROCEDURE — G0378 HOSPITAL OBSERVATION PER HR: HCPCS

## 2024-12-06 RX ORDER — METOPROLOL SUCCINATE 25 MG/1
25 TABLET, EXTENDED RELEASE ORAL DAILY
DISCHARGE
Start: 2024-12-07

## 2024-12-06 RX ORDER — SODIUM CHLORIDE 9 MG/ML
INJECTION, SOLUTION INTRAVENOUS CONTINUOUS
Status: ACTIVE | OUTPATIENT
Start: 2024-12-06 | End: 2024-12-06

## 2024-12-06 RX ORDER — WARFARIN SODIUM 4 MG/1
4 TABLET ORAL
Status: COMPLETED | OUTPATIENT
Start: 2024-12-06 | End: 2024-12-06

## 2024-12-06 RX ORDER — GENTAMICIN SULFATE 1 MG/G
CREAM TOPICAL DAILY PRN
Status: DISCONTINUED | OUTPATIENT
Start: 2024-12-06 | End: 2024-12-07 | Stop reason: HOSPADM

## 2024-12-06 RX ORDER — METOPROLOL SUCCINATE 25 MG/1
25 TABLET, EXTENDED RELEASE ORAL DAILY
Status: DISCONTINUED | OUTPATIENT
Start: 2024-12-06 | End: 2024-12-07 | Stop reason: HOSPADM

## 2024-12-06 RX ADMIN — PANTOPRAZOLE SODIUM 40 MG: 40 TABLET, DELAYED RELEASE ORAL at 06:59

## 2024-12-06 RX ADMIN — CALCITRIOL CAPSULES 0.25 MCG 0.25 MCG: 0.25 CAPSULE ORAL at 23:26

## 2024-12-06 RX ADMIN — CLOPIDOGREL BISULFATE 75 MG: 75 TABLET ORAL at 23:26

## 2024-12-06 RX ADMIN — Medication 5 MG: at 23:40

## 2024-12-06 RX ADMIN — CARVEDILOL 3.12 MG: 3.12 TABLET, FILM COATED ORAL at 09:12

## 2024-12-06 RX ADMIN — INSULIN GLARGINE 20 UNITS: 100 INJECTION, SOLUTION SUBCUTANEOUS at 23:45

## 2024-12-06 RX ADMIN — SODIUM CHLORIDE: 9 INJECTION, SOLUTION INTRAVENOUS at 14:26

## 2024-12-06 RX ADMIN — CINACALCET 60 MG: 30 TABLET ORAL at 23:41

## 2024-12-06 RX ADMIN — SEVELAMER CARBONATE 800 MG: 800 TABLET, FILM COATED ORAL at 18:34

## 2024-12-06 RX ADMIN — ATORVASTATIN CALCIUM 40 MG: 40 TABLET, FILM COATED ORAL at 23:26

## 2024-12-06 RX ADMIN — ONDANSETRON 4 MG: 4 TABLET, ORALLY DISINTEGRATING ORAL at 05:32

## 2024-12-06 RX ADMIN — ALLOPURINOL 200 MG: 100 TABLET ORAL at 23:26

## 2024-12-06 RX ADMIN — WARFARIN SODIUM 4 MG: 4 TABLET ORAL at 18:34

## 2024-12-06 RX ADMIN — SEVELAMER CARBONATE 800 MG: 800 TABLET, FILM COATED ORAL at 12:45

## 2024-12-06 RX ADMIN — SEVELAMER CARBONATE 800 MG: 800 TABLET, FILM COATED ORAL at 09:12

## 2024-12-06 ASSESSMENT — ACTIVITIES OF DAILY LIVING (ADL)
ADLS_ACUITY_SCORE: 53
ADLS_ACUITY_SCORE: 53
ADLS_ACUITY_SCORE: 55
ADLS_ACUITY_SCORE: 55
ADLS_ACUITY_SCORE: 53
ADLS_ACUITY_SCORE: 55
ADLS_ACUITY_SCORE: 53
ADLS_ACUITY_SCORE: 55
ADLS_ACUITY_SCORE: 53
ADLS_ACUITY_SCORE: 55
ADLS_ACUITY_SCORE: 53
ADLS_ACUITY_SCORE: 55
ADLS_ACUITY_SCORE: 53
DEPENDENT_IADLS:: INDEPENDENT
ADLS_ACUITY_SCORE: 53
ADLS_ACUITY_SCORE: 53

## 2024-12-06 NOTE — PROGRESS NOTES
[facility]    Hospitalist Progress Note    Brief Summary:  Arnulfo Pritchett is a 64 year old male with PMHx of coronary artery disease with multiple stents, hypertension, hyperlipidemia, chronic paroxysmal A-fib (s/p ablation), ESRD on PD, secondary hyperparathyroidism, RCC (s/p nephrectomy), DM type II, gout, GERD, TALI who was recently admitted to the hospital from 10/15 - 11/9 for critical limb ischemia of the left lower extremity and ischemic left great toe wound, and again from 11/27/24-12/3/24 for persistent hypotension with initial concern for cardiogenic shock, but repeat coronary angiogram showed stable disease. Pt was briefly treated with dobumatine. He was followed by Nephrology and Cardiology. His peritoneal dialysis fluids and cardiac meds were adjusted. He was felt to be hypovolemic. He was seen outpatient by Cardiology  and noted SBPs in the 70s and symptomatic. Directed to the ED where he received fluids and one dose of midodrine with improvement.        Assessment & Plan     Symptomatic orthostatic hypotension: Improved     This is most likely secondary to medications.  He was continued on reduced dose of Coreg 3.25 mg twice daily, but continue on  Entresto as well as torsemide 100 mg p.o. daily on discharge.  He make very little urine, so torsemide was discontinued by nephrology,  Cardiology recommended stopping the Entresto as well.  His Coreg was switched to oral metoprolol 25 mg XL as well.  Although his supine blood pressure improved, he still have some orthostatic hypotension.  Cardiology evaluated recommended some IV fluid for 3 hours 25 mL/h I agree with that.  At this time the patient is otherwise stable, will start metoprolol from tomorrow as he already got Coreg today.  If he remains stable he may be able to discharge later today if a TCU can take or tomorrow is too late for the TCU.         History of coronary artery disease with multiple stents (last intervention  in 2021)  Chronic severe HFrEF, ischemic cardiomyopathy, not in acute exacerbation (20-25%):   *History of inferior STEMI in 2017 requiring PCI to the RCA; history of PCI to the OM and LCx in 2021; most recent ischemic evaluation with repeat coronary angiogram during his recent admission which showed severe two-vessel coronary artery disease, proximal RCA 70% stenosed and mid circumflex to distal circumflex 80% stenosed--given lack of angina and other acute events conservative approach was recommended by cardiology; on aspirin, statin, and BB.   *Echo 6/2024 showed LVEF 20-25%, segmental wall motion globally hypokinetic.     Overall stable at this time, no active chest pain continue PTA medication, except discontinue Coreg discontinue torsemide and Entresto.  Will start on low-dose metoprolol XL 25 mg daily.  -  Continue BB, statin and Plavix at this time.     Chronic atrial fibrillation, rate controlled   Chronic anticoagulation use with coumadin: Hx of ablation.   - Continue BB, pharmacy to dose coumadin   .  Good control.     History of severe peripheral artery disease  Recent admission from 10/15 - 11/9 with Critical limb ischemia of the left lower extremity   S/P left femoral endarterectomy with bovine pericardial patch angioplasty; left distal common femoral artery/proximal superficial femoral artery to tibioperoneal trunk bypass; and temporary closure of left groin wound with wound VAC 10/25/2024.  S/P ligation of side branches of the left great saphenous vein and temporary closure of right groin with application of wound VAC 10/27/2024.  Ischemic left great toe wound S/P amputation of left great toe 10/27/2024.  Left groin wound infection.  Hx of acute RLE arterial occlusion s/p SFA popliteal balloon angioplasty and stenting (5/2024)  - Wound care consulted for L groin and leg incision, toe wound cares   - Continue Plavix and statin as above      ESRD secondary to diabetes mellitus nephropathy on  PD  Secondary hyperparathyroidism  Hx RCC s/p nephrectomy   -Nephrology consulted, due to timing of transfer up to the floor, no peritoneal dialysis staff to set patient up for 12/5 evening, but will plan for 12/6 if remains in house. This was discussed with patient      T2DM, insulin dependent, controlled:   Last hemoglobin A1c 5.7  [Lantus 20 U qhs]  - Hold PTA oral agents at this time   - Continue Lantus 20 U qhs   - Medium sliding scale insulin ordered  - BS per protocol   - Monitor for hypoglycemia  Resume PTA medication on discharge.                     Recent Labs   Lab 12/05/24  1058 12/03/24  1152 12/03/24  0729 12/03/24  0536 12/03/24  0156 12/02/24  2111   * 78 61* 81 94 152*         Chronic, stable medical conditions: Resume meds as appropriate   Anemia of chronic disease  GERD: Continue PPI  TALI : Intolerant to CPAP, follow-up in sleep clinic as outpatient  Gout: Continue allopurinol  GERD         Clinically Significant Risk Factors Present on Admission         # Hyponatremia: Lowest Na = 132 mmol/L in last 2 days, will monitor as appropriate  # Hypochloremia: Lowest Cl = 93 mmol/L in last 2 days, will monitor as appropriate      # Hypoalbuminemia: Lowest albumin = 2.6 g/dL at 12/5/2024 10:58 AM, will monitor as appropriate  # Drug Induced Coagulation Defect: home medication list includes an anticoagulant medication  # Drug Induced Platelet Defect: home medication list includes an antiplatelet medication   # Hypertension: Noted on problem list      # Anemia: based on hgb <11           # Financial/Environmental Concerns:             DVT Prophylaxis: Warfarin  Code Status: Full Code        Medically Ready for Discharge: Anticipated Today later today or tomorrow.        Ang Palmer MD, MD  Text Page  (7am - 6pm)    Interval History   Patient seen and evaluated this morning, feeling well this morning denies any chest pain abdominal pain dysuria hematuria constipation diarrhea.  We did check his  orthostatic blood pressure he did drop significantly with some mild dizziness    No other significant event overnight    -Data reviewed today: I reviewed all new labs and imaging results over the last 24 hours. I personally reviewed no images or EKG's today.    Physical Exam   Temp: 98.4  F (36.9  C) Temp src: Oral BP: 124/87 Pulse: 111   Resp: 18 SpO2: 99 % O2 Device: None (Room air)    Vitals:    12/05/24 1207 12/05/24 2102 12/06/24 0700   Weight: 79.4 kg (175 lb 1.6 oz) 77.6 kg (171 lb 1.2 oz) 79.7 kg (175 lb 11.3 oz)     Vital Signs with Ranges  Temp:  [97.1  F (36.2  C)-98.7  F (37.1  C)] 98.4  F (36.9  C)  Pulse:  [100-115] 111  Resp:  [16-18] 18  BP: (121-137)/(83-98) 124/87  SpO2:  [94 %-99 %] 99 %  I/O last 3 completed shifts:  In: 240 [P.O.:240]  Out: 300 [Urine:300]    Constitutional: awake, alert, cooperative, no apparent distress, and appears stated age  Eyes: Lids and lashes normal, pupils equal, round and reactive to light, extra ocular muscles intact, sclera clear, conjunctiva normal  Respiratory: No increased work of breathing, good air exchange, clear to auscultation bilaterally, no crackles or wheezing  Cardiovascular: Normal apical impulse, regular rate and rhythm, normal S1 and S2, no S3 or S4, and no murmur noted  GI: No scars, normal bowel sounds, soft, non-distended, non-tender, no masses palpated, no hepatosplenomegally  Musculoskeletal: no lower extremity pitting edema present  Neurologic: No focal deficit    Medications   Current Facility-Administered Medications   Medication Dose Route Frequency Provider Last Rate Last Admin    sodium chloride 0.9 % infusion   Intravenous Continuous Clem Matt MD 75 mL/hr at 12/06/24 1426 New Bag at 12/06/24 1426     Current Facility-Administered Medications   Medication Dose Route Frequency Provider Last Rate Last Admin    allopurinol (ZYLOPRIM) tablet 200 mg  200 mg Oral QPM Hannah Christie PA-C   200 mg at 12/05/24 2007     atorvastatin (LIPITOR) tablet 40 mg  40 mg Oral At Bedtime Hannah Christie PA-C   40 mg at 12/05/24 2127    calcitRIOL (ROCALTROL) capsule 0.25 mcg  0.25 mcg Oral At Bedtime Hannah Christie PA-C   0.25 mcg at 12/05/24 2127    cinacalcet (SENSIPAR) tablet 60 mg  60 mg Oral Q Mon Wed Fri AM Hannah Christie PA-C        clopidogrel (PLAVIX) tablet 75 mg  75 mg Oral QPM Hannah Christie PA-C   75 mg at 12/05/24 2001    insulin aspart (NovoLOG) injection (RAPID ACTING)  1-7 Units Subcutaneous TID  Hannah Christie PA-C        insulin aspart (NovoLOG) injection (RAPID ACTING)  1-5 Units Subcutaneous At Bedtime Hannah Christie PA-C        insulin glargine (LANTUS PEN) injection 20 Units  20 Units Subcutaneous At Bedtime Hannah Christie PA-C   20 Units at 12/05/24 2340    metoprolol succinate ER (TOPROL XL) 24 hr tablet 25 mg  25 mg Oral Daily Alexus Sanchez PA-C        pantoprazole (PROTONIX) EC tablet 40 mg  40 mg Oral QAM AC Ang Palmer MD   40 mg at 12/06/24 0659    sevelamer carbonate (RENVELA) tablet 800 mg  800 mg Oral TID w/meals Hannah Christie PA-C   800 mg at 12/06/24 1245    warfarin ANTICOAGULANT (COUMADIN) tablet 4 mg  4 mg Oral ONCE at 18:00 Ang Palmer MD        Warfarin Dose Required Daily - Pharmacist Managed  1 each Oral See Admin Instructions Hannah Christie PA-C           Data   Recent Labs   Lab 12/06/24  1056 12/06/24  0850 12/06/24  0609 12/05/24  2145 12/05/24  1554 12/05/24  1532 12/05/24  1058 12/03/24  0729 12/03/24  0536   WBC  --   --  6.1  --   --   --  7.9  --  6.6   HGB  --   --  10.3*  --   --   --  11.9*  --  9.8*   MCV  --   --  95  --   --   --  96  --  94   PLT  --   --  236  --   --   --  273  --  232   INR  --   --  2.59*  --  2.30*  --  2.29*  --  2.39*   NA  --   --  134*  --   --   --  132*  --  135   POTASSIUM  --   --  3.5  --   --   --  4.2  --  3.5   CHLORIDE  --   --   98  --   --   --  93*  --  99   CO2  --   --  27  --   --   --  27  --  26   BUN  --   --  29.3*  --   --   --  31.7*  --  31.4*   CR  --   --  7.10*  --   --   --  7.47*  --  7.39*   ANIONGAP  --   --  9  --   --   --  12  --  10   TERENCE  --   --  8.0*  --   --   --  8.8  --  8.4*   * 89 116*   < >  --    < > 170*   < > 81   ALBUMIN  --   --   --   --   --   --  2.6*  --   --    PROTTOTAL  --   --   --   --   --   --  5.8*  --   --    BILITOTAL  --   --   --   --   --   --  0.3  --   --    ALKPHOS  --   --   --   --   --   --  243*  --   --    ALT  --   --   --   --   --   --  17  --   --    AST  --   --   --   --   --   --  33  --   --     < > = values in this interval not displayed.       No results found for this or any previous visit (from the past 24 hours).

## 2024-12-06 NOTE — PHARMACY-ANTICOAGULATION SERVICE
Clinical Pharmacy - Warfarin Dosing Consult     Pharmacy has been consulted to manage this patient s warfarin therapy.  Indication: Atrial Fibrillation  Therapy Goal: INR 2-3  Warfarin Prior to Admission: Yes  Warfarin PTA Regimen: 4 mg Tues, Wed, 3 mg ROW  Significant drug interactions: allopurinol may increase INR, atorvastatin may increase exposure to warfarin, clopidogrel may increase bleedng risk, pantprazole may increase INR  Recent documented change in oral intake/nutrition: Unknown    INR   Date Value Ref Range Status   12/05/2024 2.30 (H) 0.85 - 1.15 Final   12/05/2024 2.29 (H) 0.85 - 1.15 Final       Recommend warfarin 5 mg today.  Pharmacy will monitor Arnulfo D Shreyas daily and order warfarin doses to achieve specified goal.      Please contact pharmacy as soon as possible if the warfarin needs to be held for a procedure or if the warfarin goals change.

## 2024-12-06 NOTE — PLAN OF CARE
Goal Outcome Evaluation:      Plan of Care Reviewed With: patient    Overall Patient Progress: improvingOverall Patient Progress: improving     Reason for Admission: hypotension  Cognitive/Mentation: A/Ox 4  VS: stable on RA.   GI/: Continent. No BM this shift.  Pulmonary: LS clear.  Pain: denies.   Drains/Lines: PIV SL, PD drain  Skin: wound to L inner leg, groin and L big toe, pressure injury both feet  Activity: SBA GBW  Diet: Renal with thin liquids. Takes pills whole.   Discharge: pending back to TCU  Aggression Stoplight Tool: green  End of shift summary: Episode of emesis around 5:30 AM, zofran given. Pt says this happens at times. He otherwise tolerated PD well overnight.

## 2024-12-06 NOTE — PROGRESS NOTES
Nephrology brief note    Uneventful PD last night, all 1.5% dextrose solution juice.  -35 cc ultrafiltration which implies he absorbed 35 cc of volume but essentially was net neutral.    Patient makes around 300 cc urine daily.  Torsemide likely not providing much benefit.  Entresto being held by cardiology    Okay to discharge from nephrology perspective along is not having significant orthostasis with ambulation today.  Will continue 1.5% dextrose solutions at facility.

## 2024-12-06 NOTE — PROGRESS NOTES
vital signs normal or at patient baseline, no recurrent hypotension not met, still orthostatic +, MD Dr Palmer aware  -Nephrology consult  following  -CC consult to return to [facility] TCU following

## 2024-12-06 NOTE — DISCHARGE SUMMARY
[facility]    Discharge Summary  Hospitalist    Date of Admission:  12/5/2024  Date of Discharge:  12/7/2024  Discharging Provider: Ang Palmer MD, MD  Date of Service (when I saw the patient): 12/06/24    Discharge Diagnoses   Please refer below    History of Present Illness   Arnulfo Pritchett is an 64 year old male who presented with low blood pressure    Hospital Course   Arnulfo Pritchett is a 64 year old male with PMHx of coronary artery disease with multiple stents, hypertension, hyperlipidemia, chronic paroxysmal A-fib (s/p ablation), ESRD on PD, secondary hyperparathyroidism, RCC (s/p nephrectomy), DM type II, gout, GERD, TALI who was recently admitted to the hospital from 10/15 - 11/9 for critical limb ischemia of the left lower extremity and ischemic left great toe wound, and again from 11/27/24-12/3/24 for persistent hypotension with initial concern for cardiogenic shock, but repeat coronary angiogram showed stable disease. Pt was briefly treated with dobumatine. He was followed by Nephrology and Cardiology. His peritoneal dialysis fluids and cardiac meds were adjusted. He was felt to be hypovolemic. He was seen outpatient by Cardiology  and noted SBPs in the 70s and symptomatic. Directed to the ED where he received fluids and one dose of midodrine with improvement.            Final discharge diagnosis Hospital course    Symptomatic orthostatic hypotension: Improved     This is most likely secondary to medications.  He was continued on reduced dose of Coreg 3.25 mg twice daily, but continue on  Entresto as well as torsemide 100 mg p.o. daily on discharge.  He make very little urine, so torsemide was discontinued by nephrology,  Cardiology recommended stopping the Entresto as well.  His Coreg was switched to oral metoprolol 25 mg XL as well.  Although his supine blood pressure improved, he still have some orthostatic hypotension.  Cardiology evaluated recommended some IV fluid for 3  hours 75 mL/h I agree with that.  At this time the patient is otherwise stable, will start metoprolol from tomorrow as he already got Coreg today.  If he remains stable he may be able to discharge later today, back to his TCU in stable improved condition       History of coronary artery disease with multiple stents (last intervention in 2021)  Chronic severe HFrEF, ischemic cardiomyopathy, not in acute exacerbation (20-25%):   *History of inferior STEMI in 2017 requiring PCI to the RCA; history of PCI to the OM and LCx in 2021; most recent ischemic evaluation with repeat coronary angiogram during his recent admission which showed severe two-vessel coronary artery disease, proximal RCA 70% stenosed and mid circumflex to distal circumflex 80% stenosed--given lack of angina and other acute events conservative approach was recommended by cardiology; on aspirin, statin, and BB.   *Echo 6/2024 showed LVEF 20-25%, segmental wall motion globally hypokinetic.      Overall stable at this time, no active chest pain continue PTA medication, except discontinue Coreg discontinue torsemide and Entresto.  Will start on low-dose metoprolol XL 25 mg daily.  -  Continue BB, statin and Plavix at this time.     Chronic atrial fibrillation, rate controlled   Chronic anticoagulation use with coumadin: Hx of ablation.   - Continue BB, pharmacy to dose coumadin   .  Good control.     History of severe peripheral artery disease  Recent admission from 10/15 - 11/9 with Critical limb ischemia of the left lower extremity   S/P left femoral endarterectomy with bovine pericardial patch angioplasty; left distal common femoral artery/proximal superficial femoral artery to tibioperoneal trunk bypass; and temporary closure of left groin wound with wound VAC 10/25/2024.  S/P ligation of side branches of the left great saphenous vein and temporary closure of right groin with application of wound VAC 10/27/2024.  Ischemic left great toe wound S/P  amputation of left great toe 10/27/2024.  Left groin wound infection.  Hx of acute RLE arterial occlusion s/p SFA popliteal balloon angioplasty and stenting (5/2024)  - Wound care consulted for L groin and leg incision, toe wound cares   - Continue Plavix and statin as above      ESRD secondary to diabetes mellitus nephropathy on PD  Secondary hyperparathyroidism  Hx RCC s/p nephrectomy   -Nephrology consulted, due to timing of transfer up to the floor, no peritoneal dialysis staff to set patient up for 12/5 evening, but will plan for 12/6 if remains in house. This was discussed with patient      T2DM, insulin dependent, controlled:   Last hemoglobin A1c 5.7  [Lantus 20 U qhs]  - Hold PTA oral agents at this time   - Continue Lantus 20 U qhs   - Medium sliding scale insulin ordered  - BS per protocol   - Monitor for hypoglycemia  Resume PTA medication on discharge.           Chronic, stable medical conditions: Resume meds as appropriate   Anemia of chronic disease  GERD: Continue PPI  TALI : Intolerant to CPAP, follow-up in sleep clinic as outpatient  Gout: Continue allopurinol  GERD      Clinically Significant Risk Factors Present on Admission         # Hyponatremia: Lowest Na = 132 mmol/L in last 2 days, will monitor as appropriate  # Hypochloremia: Lowest Cl = 93 mmol/L in last 2 days, will monitor as appropriate      # Hypoalbuminemia: Lowest albumin = 2.6 g/dL at 12/5/2024 10:58 AM, will monitor as appropriate  # Drug Induced Coagulation Defect: home medication list includes an anticoagulant medication  # Drug Induced Platelet Defect: home medication list includes an antiplatelet medication   # Hypertension: Noted on problem list      # Anemia: based on hgb <11           # Financial/Environmental Concerns:               Ang Palmer MD, MD    Significant Results and Procedures       Pending Results   These results will be followed up by   Unresulted Labs Ordered in the Past 30 Days of this Admission       No  orders found for last 31 day(s).            Code Status   Full Code       Primary Care Physician   Chai Griffin    Physical Exam   Temp: 98.4  F (36.9  C) Temp src: Oral BP: 124/87 Pulse: 111   Resp: 18 SpO2: 99 % O2 Device: None (Room air)    Vitals:    12/05/24 1207 12/05/24 2102 12/06/24 0700   Weight: 79.4 kg (175 lb 1.6 oz) 77.6 kg (171 lb 1.2 oz) 79.7 kg (175 lb 11.3 oz)     Vital Signs with Ranges  Temp:  [97.1  F (36.2  C)-98.7  F (37.1  C)] 98.4  F (36.9  C)  Pulse:  [100-112] 111  Resp:  [16-18] 18  BP: (121-137)/(83-90) 124/87  SpO2:  [94 %-99 %] 99 %  I/O last 3 completed shifts:  In: 480 [P.O.:480]  Out: 350 [Urine:350]    Constitutional: awake, alert, cooperative, no apparent distress, and appears stated age  Eyes: Lids and lashes normal, pupils equal, round and reactive to light, extra ocular muscles intact, sclera clear, conjunctiva normal  Respiratory: No increased work of breathing, good air exchange, clear to auscultation bilaterally, no crackles or wheezing  Cardiovascular: Normal apical impulse, regular rate and rhythm, normal S1 and S2, no S3 or S4, and no murmur noted  GI: No scars, normal bowel sounds, soft, non-distended, non-tender, no masses palpated, no hepatosplenomegally, peritoneal dialysis catheter in place  Neurologic: No focal deficit    Discharge Disposition   Discharged to short-term care facility  Condition at discharge: Stable    Consultations This Hospital Stay   PHARMACY TO DOSE WARFARIN  NEPHROLOGY IP CONSULT  WOUND OSTOMY CONTINENCE NURSE  IP CONSULT  CARDIOLOGY IP CONSULT  CARE MANAGEMENT / SOCIAL WORK IP CONSULT  WOUND OSTOMY CONTINENCE NURSE  IP CONSULT  PHYSICAL THERAPY ADULT IP CONSULT  OCCUPATIONAL THERAPY ADULT IP CONSULT    Time Spent on this Encounter   Ang DYER MD, personally saw the patient today and spent greater than 30 minutes discharging this patient.    Discharge Orders      Primary Care - Care Coordination Referral      General info for SNF     Length of Stay Estimate: Short Term Care: Estimated # of Days <30  Condition at Discharge: Stable  Level of care:skilled   Rehabilitation Potential: Good  Admission H&P remains valid and up-to-date: Yes  Recent Chemotherapy: N/A  Use Nursing Home Standing Orders: Yes     Mantoux instructions    Give two-step Mantoux (PPD) Per Facility Policy Yes     Follow Up and recommended labs and tests    Follow up with [facility] physician.  The following labs/tests are recommended: INR.     Reason for your hospital stay    Hypotension     Intake and output    Every shift     Daily weights    Call Provider for weight gain of more than 2 pounds per day or 5 pounds per week.     Activity - Up with assistive device     Activity - Up with nursing assistance     Full Code     Physical Therapy Adult Consult    Evaluate and treat as clinically indicated.    Reason:  WEAKNESS     Occupational Therapy Adult Consult    Evaluate and treat as clinically indicated.    Reason:  weakness     Fall precautions     Diet    Follow this diet upon discharge: Current Diet:Orders Placed This Encounter      Renal Diet (dialysis)     Discharge Medications   Current Discharge Medication List        START taking these medications    Details   metoprolol succinate ER (TOPROL XL) 25 MG 24 hr tablet Take 1 tablet (25 mg) by mouth daily.    Associated Diagnoses: NSTEMI (non-ST elevated myocardial infarction) (H)           CONTINUE these medications which have CHANGED    Details   insulin glargine (LANTUS PEN) 100 UNIT/ML pen Inject 35 Units subcutaneously at bedtime.    Comments: If Lantus is not covered by insurance, may substitute Basaglar or Semglee or other insulin glargine product per insurance preference at same dose and frequency.    Associated Diagnoses: ACS (acute coronary syndrome) (H)           CONTINUE these medications which have NOT CHANGED    Details   acetaminophen (TYLENOL) 500 MG tablet Take 1,000 mg by mouth every 8 hours as needed.       allopurinol (ZYLOPRIM) 100 MG tablet Take 200 mg by mouth every evening      atorvastatin (LIPITOR) 40 MG tablet Take 40 mg by mouth at bedtime.      B Complex-C-Folic Acid (DIALYVITE) TABS Take 1 tablet by mouth daily.      calcitRIOL (ROCALTROL) 0.25 MCG capsule Take 0.25 mcg by mouth at bedtime.      cinacalcet (SENSIPAR) 60 MG tablet Take 60 mg by mouth. Take 1 tablet (60 mg) three times a week: Monday, Wednesday, and Friday nights      clopidogrel (PLAVIX) 75 MG tablet Take 75 mg by mouth every evening.      gentamicin (GARAMYCIN) 0.1 % external cream Apply topically daily as needed. Apply topically on peritoneal access site to prevent infections      glucose 40 % (400 mg/mL) gel Take 15-30 g by mouth every 15 minutes as needed for low blood sugar.    Associated Diagnoses: Type 2 diabetes mellitus with chronic kidney disease, with long-term current use of insulin, unspecified CKD stage (H)      melatonin 5 MG tablet Take 5 mg by mouth at bedtime      midodrine (PROAMATINE) 2.5 MG tablet Take 1 tablet (2.5 mg) by mouth once as needed (hypotension/dizziness post PD in AM during the day).    Associated Diagnoses: Diabetes mellitus due to underlying condition, controlled, with chronic kidney disease on chronic dialysis, with long-term current use of insulin (H)      omeprazole (PRILOSEC) 20 MG DR capsule Take 20 mg by mouth daily.      oxyCODONE (ROXICODONE) 5 MG tablet Take 1 tablet (5 mg) by mouth every 6 hours as needed for severe pain.  Qty: 10 tablet, Refills: 0    Associated Diagnoses: ACS (acute coronary syndrome) (H); Ischemic ulcer of toe, left, with unspecified severity (H)      polyethylene glycol (MIRALAX) 17 g packet Take 1 packet by mouth daily.      senna-docusate (SENOKOT-S/PERICOLACE) 8.6-50 MG tablet Take 1 tablet by mouth daily as needed for constipation.      SEVELAMER CARBONATE PO Take 800 mg by mouth 3 times daily (with meals)      !! warfarin ANTICOAGULANT (COUMADIN) 3 MG tablet Take 3 mg  by mouth five times a week. On Mon, Thu, Fri, Sat, Sun      !! warfarin ANTICOAGULANT (COUMADIN) 4 MG tablet Take 4 mg by mouth twice a week. On Tue and Wed       !! - Potential duplicate medications found. Please discuss with provider.        STOP taking these medications       carvedilol (COREG) 3.125 MG tablet Comments:   Reason for Stopping:         sacubitril-valsartan (ENTRESTO) 49-51 MG per tablet Comments:   Reason for Stopping:         torsemide (DEMADEX) 100 MG tablet Comments:   Reason for Stopping:             Allergies   No Known Allergies  Data   Most Recent 3 CBC's:  Recent Labs   Lab Test 12/06/24  0609 12/05/24  1058 12/03/24  0536   WBC 6.1 7.9 6.6   HGB 10.3* 11.9* 9.8*   MCV 95 96 94    273 232      Most Recent 3 BMP's:  Recent Labs   Lab Test 12/06/24  1056 12/06/24  0850 12/06/24  0609 12/05/24  1532 12/05/24  1058 12/03/24  0729 12/03/24  0536   NA  --   --  134*  --  132*  --  135   POTASSIUM  --   --  3.5  --  4.2  --  3.5   CHLORIDE  --   --  98  --  93*  --  99   CO2  --   --  27  --  27  --  26   BUN  --   --  29.3*  --  31.7*  --  31.4*   CR  --   --  7.10*  --  7.47*  --  7.39*   ANIONGAP  --   --  9  --  12  --  10   TERENCE  --   --  8.0*  --  8.8  --  8.4*   * 89 116*   < > 170*   < > 81    < > = values in this interval not displayed.     Most Recent 2 LFT's:  Recent Labs   Lab Test 12/05/24  1058   AST 33   ALT 17   ALKPHOS 243*   BILITOTAL 0.3     Most Recent INR's and Anticoagulation Dosing History:  Anticoagulation Dose History  More data exists         Latest Ref Rng & Units 11/29/2024 11/30/2024 12/1/2024 12/2/2024 12/3/2024 12/5/2024 12/6/2024   Recent Dosing and Labs   warfarin ANTICOAGULANT (COUMADIN) 1 MG tablet - - - - 6.5 mg, $Given - - -   warfarin ANTICOAGULANT (COUMADIN) 3 MG tablet - 6 mg, $Given 6 mg, $Given 6 mg, $Given - - - -   warfarin ANTICOAGULANT (COUMADIN) 5 MG tablet - - - - - - 5 mg, $Given -   INR 0.85 - 1.15 1.75  1.64  1.87  1.92  2.39  2.30   2.29  2.59       Details          Multiple values from one day are sorted in reverse-chronological order             Most Recent 3 Troponin's:No lab results found.  Most Recent Cholesterol Panel:No lab results found.  Most Recent 6 Bacteria Isolates From Any Culture (See EPIC Reports for Culture Details):No lab results found.  Most Recent TSH, T4 and A1c Labs:  Recent Labs   Lab Test 11/27/24  1035 10/16/24  0642   TSH 6.45*  --    T4 1.38  --    A1C  --  5.7*     Results for orders placed or performed during the hospital encounter of 12/05/24   XR Chest 2 Views    Narrative    CHEST TWO VIEWS 12/5/2024 11:47 AM     HISTORY: hypotension    COMPARISON: Chest radiograph 11/27/2024. CTA abdomen and pelvis  10/17/2024.       Impression    IMPRESSION: Trace bilateral pleural effusions. No focal consolidation  or pneumothorax. Similar cardiomediastinal silhouette. Remote right  rib fractures.    Small volume pneumoperitoneum as seen on the lateral view, similar to  prior CT and probably from known peritoneal dialysis catheter.  Clinically correlate.    SOURAV LEE MD         SYSTEM ID:  [id number]     Most Recent 3 CBC's:  Recent Labs   Lab Test 12/06/24  0609 12/05/24  1058 12/03/24  0536   WBC 6.1 7.9 6.6   HGB 10.3* 11.9* 9.8*   MCV 95 96 94    273 232     Most Recent 3 BMP's:  Recent Labs   Lab Test 12/06/24  1056 12/06/24  0850 12/06/24  0609 12/05/24  1532 12/05/24  1058 12/03/24  0729 12/03/24  0536   NA  --   --  134*  --  132*  --  135   POTASSIUM  --   --  3.5  --  4.2  --  3.5   CHLORIDE  --   --  98  --  93*  --  99   CO2  --   --  27  --  27  --  26   BUN  --   --  29.3*  --  31.7*  --  31.4*   CR  --   --  7.10*  --  7.47*  --  7.39*   ANIONGAP  --   --  9  --  12  --  10   TERENCE  --   --  8.0*  --  8.8  --  8.4*   * 89 116*   < > 170*   < > 81    < > = values in this interval not displayed.     Most Recent 2 LFT's:  Recent Labs   Lab Test 12/05/24  1058   AST 33   ALT 17   ALKPHOS 243*    BILITOTAL 0.3

## 2024-12-06 NOTE — PROGRESS NOTES
Observation Goals    -vital signs normal or at patient baseline, no recurrent hypotension YES  -Nephrology consult YES  -[facility]

## 2024-12-06 NOTE — PROGRESS NOTES
Brief Cardiology Note    Will addend WILLIAM note once written with formal recs, but from cardiology standpoint given continued positive orthostatics, would recommend continuing to encourage PO intake, and just a small amount of IV fluids while he's here.  Ordered 75 ml/hr NS for 3 hours.  After this OK to discharge from a cardiology standpoint.  Of note, we will also switch him from carvedilol to metoprolol to give us a better chance to optimize his GDMT without tanking his BP.    Clem Matt MD  1:39 PM  12/6/2024

## 2024-12-06 NOTE — PROGRESS NOTES
[facility]    Cardiology Progress Note    Primary Cardiologist: Dr. Spears    Date of Admission: 12/5/2024  Service Date: 12/06/2024     Interval History   HR 100s. -130s. Oliguric on peritoneal dialysis. Weight is up. Reasonable considering suspicion of hypovolemia at admit. K 3.5. Cr 7.10, Trending down. INR 2.5.     Feels well overall. Still reports orthostatic dizziness that lasts about a minute, although better than yesterday. No acute concerns. Discussed plans for medication changes and he expressed understanding, while also requesting Entresto be resumed if able because he knows the importance for his HF.    Telemetry: Sinus tachycardia    New Changes:  Change Coreg to Toprol XL 25 mg daily to reduce BP effect  Continue to hold Entresto, can discuss resuming at later time pending BP. Could also consider low-dose lisinopril/losartan  Recommend outpatient cardiac MRI to rule out infiltrative process  Hold off on further PCI unless he develops angina  Cardiology will sign off    Assessment & Plan:  Symptomatic hypotension  Improved following fluid resuscitation and midodrine x1  Chronic HFrEF  Ischemic cardiomyopathy  CAD s/p STEMI and PCI to RCA (2017); and PCI to Lcx/OM (2021)  Most recent angio (11/27/2024) with stable moderate-severe stenoses of pRCA and mid-distal Lcx, currently medically managed due to lack of angina and noncardiac co morbidities creating higher risks  TTE 11/27- LVEF 30%, severe global hypokinesis, mildly reduced RV systolic function, no significant valvular disease  Cardiac Medications:  Toprol XL 25 mg once daily  Warfarin (INR goal 2-3)  Plavix (clopidigrel)  Atorvastatin 40 daily   Severe PAD s/p multiple lower extremity vascular procedures  Paroxysmal atrial fibrillation s/p ablation  Currently in sinus rhythm, anticoagulated on warfarin  ESRD on peritoneal dialysis  Nephrology following  History of RCC with nephrectomy  History of CVA  TALI on  CPAP  Type 2 diabetes mellitus  Anemia of chronic disease  Ischemic left great toe s/p amputation (10/2024)       Thank you for the opportunity to participate in this pleasant patient's care.     Alexus Sanchez PA-C   [facility]  Securely message via [employer] (8am - 5pm, M-F)    Summary of Stay:  Mr. Arnulfo Pritchett is a very pleasant 64 year old male who was admitted on 12/5/2024 for  hypotension . He was sent to the ED for evaluation following a clinic visit with cardiology. He was experiencing symptomatic hypotension with SBP 70s. He was given IV fluids and one dose of midodrine, this improved pressures significantly. His Entresto and torsemide were held. His Coreg was lowered to 3.125 mg twice daily; however, this was then changed to Toprol XL to reduce BP effect. He remained stable without recurrent hypotension. It was recommended to complete outpatient cardiac MRI to evaluate for infiltrative disease. His Entresto was discontinued until hypotension is resolved.    Physical Exam   Vitals: /87 (BP Location: Left arm)   Pulse 111   Temp 98.4  F (36.9  C) (Oral)   Resp 18   Ht 1.829 m (6')   Wt 79.7 kg (175 lb 11.3 oz)   SpO2 99%   BMI 23.83 kg/m    Wt Readings from Last 4 Encounters:   12/06/24 79.7 kg (175 lb 11.3 oz)   12/05/24 79.4 kg (175 lb 1.6 oz)   12/03/24 80 kg (176 lb 5.9 oz)   11/26/24 76.2 kg (168 lb)     I/O last 3 completed shifts:  In: 480 [P.O.:480]  Out: 350 [Urine:350]    Physical Exam:  General: Awake and alert. No acute distress.  Skin: Warm and dry. Appropriate color. No lesions or rashes noted.  HEENT: Normocephalic and atraumatic. EOM intact. PERRL. Trachea midline. No JVD.  Cardiac: Normal S1 and S2, no murmurs, rubs, or gallops noted. Regular rate and rhythm .    Lung: Regular work of breathing without accessory muscle use. Clear to auscultation bilaterally.  Abdomen: Nontender, mild distension.  Extremities: No lower extremity pitting edema noted  bilaterally.  Neuro: A & O. No focal deficits noted. Moves all extremities appropriately.      Imaging:  Recent Results (from the past 48 hours)   XR Chest 2 Views    Narrative    CHEST TWO VIEWS 12/5/2024 11:47 AM     HISTORY: hypotension    COMPARISON: Chest radiograph 11/27/2024. CTA abdomen and pelvis  10/17/2024.       Impression    IMPRESSION: Trace bilateral pleural effusions. No focal consolidation  or pneumothorax. Similar cardiomediastinal silhouette. Remote right  rib fractures.    Small volume pneumoperitoneum as seen on the lateral view, similar to  prior CT and probably from known peritoneal dialysis catheter.  Clinically correlate.    SOURAV LEE MD         SYSTEM ID:  [id number]       Echo:  No results found for this or any previous visit (from the past 4320 hours).    Data   Recent Labs   Lab 12/06/24  0609 12/05/24  1058 12/03/24  0536   WBC 6.1 7.9 6.6   HGB 10.3* 11.9* 9.8*   HCT 31.6* 36.5* 29.7*   MCV 95 96 94    273 232     Recent Labs   Lab 12/06/24  1056 12/06/24  0850 12/06/24  0609 12/05/24  1532 12/05/24  1058 12/03/24  0729 12/03/24  0536   NA  --   --  134*  --  132*  --  135   POTASSIUM  --   --  3.5  --  4.2  --  3.5   CHLORIDE  --   --  98  --  93*  --  99   CO2  --   --  27  --  27  --  26   ANIONGAP  --   --  9  --  12  --  10   * 89 116*   < > 170*   < > 81   BUN  --   --  29.3*  --  31.7*  --  31.4*   CR  --   --  7.10*  --  7.47*  --  7.39*   GFRESTIMATED  --   --  8*  --  7*  --  8*   TERENCE  --   --  8.0*  --  8.8  --  8.4*    < > = values in this interval not displayed.          Past Medical History   Past Medical History:   Diagnosis Date    CAD (coronary artery disease)     Chronic systolic heart failure (H)     ESRD (end stage renal disease) on dialysis (H)     Gastroesophageal reflux disease without esophagitis     Gout     HLD (hyperlipidemia)     TALI (obstructive sleep apnea)     PAD (peripheral artery disease) (H)     S/p RLE stenting of  SFA/popliteal arteries    Type 2 diabetes mellitus with diabetic neuropathy (H)        Past Surgical History   Past Surgical History:   Procedure Laterality Date    AMPUTATE TOE(S) Left 10/27/2024    Procedure: AMPUTATION, TOE left great toe;  Surgeon: Haley Joseph DPM, Podiatry/Foot and Ankle Surgery;  Location: [facility] OR    BYPASS GRAFT FEMOROTIBIAL Left 10/25/2024    Procedure: LEFT FEMORAL ENDARTERECTOMY, LEFT FEMORAL TO TIBIAL PERONEAL TRUNK BYPASS WITH LEFT GREAT SEPHANEOUS VEIN, WOUND VAC PLACEMENT ON LEFT GROIN.;  Surgeon: Raul Gray MD;  Location: [facility] OR    BYPASS GRAFT FEMOROTIBIAL Left 10/27/2024    Procedure: CREATION, BYPASS, VASCULAR, FEMORAL TO TIBIAL REVISION OF BYPASS LEFT COMMON FEMORAL TO TIBIAL.;  Surgeon: Raul Gray MD;  Location: [facility] OR    CV CORONARY ANGIOGRAM N/A 11/27/2024    Procedure: Coronary Angiogram;  Surgeon: Clem Matt MD;  [facility]    CV LOWER EXTREMITY ANGIOGRAM LEFT Left 10/27/2024    Procedure: Angiogram Lower Extremity Left;  Surgeon: Raul Gray MD;  Location: [facility] OR    CV PCI N/A 11/27/2024    Procedure: Percutaneous Coronary Intervention;  Surgeon: Clem Matt MD;  Location:  [facility]    IR LOWER EXTREMITY ANGIOGRAM LEFT  10/17/2024       Prior to Admission Medications   Prior to Admission Medications   Prescriptions Last Dose Informant Patient Reported? Taking?   B Complex-C-Folic Acid (DIALYVITE) TABS 12/5/2024 at  7:00 AM Nursing Home Yes Yes   Sig: Take 1 tablet by mouth daily.   SEVELAMER CARBONATE PO 12/5/2024 Morning Nursing Home Yes Yes   Sig: Take 800 mg by mouth 3 times daily (with meals)   acetaminophen (TYLENOL) 500 MG tablet  Nursing Home Yes Yes   Sig: Take 1,000 mg by mouth every 8 hours as needed.   allopurinol (ZYLOPRIM) 100 MG tablet 12/4/2024 Evening Nursing Home Yes Yes   Sig: Take 200 mg by mouth every evening   atorvastatin (LIPITOR) 40 MG tablet 12/4/2024  Bedtime Nursing Home Yes Yes   Sig: Take 40 mg by mouth at bedtime.   calcitRIOL (ROCALTROL) 0.25 MCG capsule 12/4/2024 at  9:00 PM Nursing Home Yes Yes   Sig: Take 0.25 mcg by mouth at bedtime.   carvedilol (COREG) 3.125 MG tablet 12/5/2024 at  8:00 AM Nursing Home No No   Sig: Take 1 tablet (3.125 mg) by mouth 2 times daily (with meals).   cinacalcet (SENSIPAR) 60 MG tablet 12/4/2024 Evening Nursing Home Yes Yes   Sig: Take 60 mg by mouth. Take 1 tablet (60 mg) three times a week: Monday, Wednesday, and Friday nights   clopidogrel (PLAVIX) 75 MG tablet 12/4/2024 at  8:00 PM Nursing Home Yes Yes   Sig: Take 75 mg by mouth every evening.   gentamicin (GARAMYCIN) 0.1 % external cream  Nursing Home Yes Yes   Sig: Apply topically daily as needed. Apply topically on peritoneal access site to prevent infections   glucose 40 % (400 mg/mL) gel  Nursing Home No Yes   Sig: Take 15-30 g by mouth every 15 minutes as needed for low blood sugar.   Patient taking differently: Take 35 g by mouth every 15 minutes as needed for low blood sugar.   insulin glargine (LANTUS PEN) 100 UNIT/ML pen 12/4/2024 Bedtime Nursing Home No No   Sig: Inject 20 Units subcutaneously at bedtime.   Patient taking differently: Inject 35 Units subcutaneously at bedtime.   melatonin 5 MG tablet 12/4/2024 at  9:00 PM Nursing Home Yes Yes   Sig: Take 5 mg by mouth at bedtime   midodrine (PROAMATINE) 2.5 MG tablet  Nursing Home No Yes   Sig: Take 1 tablet (2.5 mg) by mouth once as needed (hypotension/dizziness post PD in AM during the day).   omeprazole (PRILOSEC) 20 MG DR capsule 12/4/2024 Morning Nursing Home Yes Yes   Sig: Take 20 mg by mouth daily.   oxyCODONE (ROXICODONE) 5 MG tablet  Nursing Home No Yes   Sig: Take 1 tablet (5 mg) by mouth every 6 hours as needed for severe pain.   polyethylene glycol (MIRALAX) 17 g packet 12/4/2024 Morning Nursing Home Yes Yes   Sig: Take 1 packet by mouth daily.   sacubitril-valsartan (ENTRESTO) 49-51 MG per tablet  12/5/2024 at  8:00 AM Nursing Home No No   Sig: Take 0.5 tablets by mouth 2 times daily.   Patient taking differently: Take 1 tablet by mouth 2 times daily.   senna-docusate (SENOKOT-S/PERICOLACE) 8.6-50 MG tablet  Nursing Home Yes Yes   Sig: Take 1 tablet by mouth daily as needed for constipation.   torsemide (DEMADEX) 100 MG tablet 12/5/2024 at  7:00 AM Nursing Home No No   Sig: Take 1 tablet (100 mg) by mouth every morning.   warfarin ANTICOAGULANT (COUMADIN) 3 MG tablet 12/3/2024 Evening Nursing Home Yes Yes   Sig: Take 3 mg by mouth five times a week. On Mon, Thu, Fri, Sat, Sun   warfarin ANTICOAGULANT (COUMADIN) 4 MG tablet 12/4/2024 Evening Nursing Home Yes Yes   Sig: Take 4 mg by mouth twice a week. On Tue and Wed      Facility-Administered Medications: None     Current Facility-Administered Medications   Medication Dose Route Frequency Provider Last Rate Last Admin    allopurinol (ZYLOPRIM) tablet 200 mg  200 mg Oral QPM Hannah Christie PA-C   200 mg at 12/05/24 2007    atorvastatin (LIPITOR) tablet 40 mg  40 mg Oral At Bedtime Hannah Christie PA-C   40 mg at 12/05/24 2127    calcitRIOL (ROCALTROL) capsule 0.25 mcg  0.25 mcg Oral At Bedtime Hannah Christie PA-C   0.25 mcg at 12/05/24 2127    cinacalcet (SENSIPAR) tablet 60 mg  60 mg Oral Q Mon Wed Fri AM Hannah Christie PA-C        clopidogrel (PLAVIX) tablet 75 mg  75 mg Oral QPM Hannah Christie PA-C   75 mg at 12/05/24 2001    glucose gel 15-30 g  15-30 g Oral Q15 Min PRN Hannah Christie PA-C        Or    dextrose 50 % injection 25-50 mL  25-50 mL Intravenous Q15 Min PRN Hannah Christie PA-C        Or    glucagon injection 1 mg  1 mg Subcutaneous Q15 Min PRN Hannah Christie PA-C        dianeal PD LOW calcium-1.5% dex (calcium 2.5 mEq/L) 6,000 mL PERITONEAL DIALYSATE   Dialysis [facility] Supplied PD Duncan Lomeli DO        gentamicin (GARAMYCIN) 0.1 % cream   Topical Daily PRN  Duncan Lomeli DO        gentamicin (GARAMYCIN) 0.1 % ointment   Topical Daily PRN Ang Palmer MD        insulin aspart (NovoLOG) injection (RAPID ACTING)  1-7 Units Subcutaneous TID AC Hannah Christie PA-C        insulin aspart (NovoLOG) injection (RAPID ACTING)  1-5 Units Subcutaneous At Bedtime Hannah Christie PA-C        insulin glargine (LANTUS PEN) injection 20 Units  20 Units Subcutaneous At Bedtime Hannah Christie PA-C   20 Units at 12/05/24 2340    melatonin tablet 5 mg  5 mg Oral At Bedtime PRN Gemini Steinberg MD        metoprolol succinate ER (TOPROL XL) 24 hr tablet 25 mg  25 mg Oral Daily Alexus Sanchez PA-C        naloxone (NARCAN) injection 0.2 mg  0.2 mg Intravenous Q2 Min PRN Ang Palmer MD        Or    naloxone (NARCAN) injection 0.4 mg  0.4 mg Intravenous Q2 Min PRN Ang Palmer MD        Or    naloxone (NARCAN) injection 0.2 mg  0.2 mg Intramuscular Q2 Min PRN Ang Palmer MD        Or    naloxone (NARCAN) injection 0.4 mg  0.4 mg Intramuscular Q2 Min PRN Ang Palmer MD        ondansetron (ZOFRAN ODT) ODT tab 4 mg  4 mg Oral Q6H PRN Hannah Christie PA-C   4 mg at 12/06/24 0532    Or    ondansetron (ZOFRAN) injection 4 mg  4 mg Intravenous Q6H PRN Hannah Christie PA-C        oxyCODONE (ROXICODONE) tablet 5 mg  5 mg Oral Q6H PRN Hannah Christie PA-C        pantoprazole (PROTONIX) EC tablet 40 mg  40 mg Oral QAM AC Ang Palmer MD   40 mg at 12/06/24 0659    prochlorperazine (COMPAZINE) injection 5 mg  5 mg Intravenous Q6H PRN Hannah Christie PA-C        Or    prochlorperazine (COMPAZINE) tablet 5 mg  5 mg Oral Q6H PRN Hannah Christie PA-C        senna-docusate (SENOKOT-S/PERICOLACE) 8.6-50 MG per tablet 1 tablet  1 tablet Oral BID PRN Hannah Christie PA-C        Or    senna-docusate (SENOKOT-S/PERICOLACE) 8.6-50 MG per tablet 2 tablet  2 tablet Oral BID PRN  Hannah Christie PA-C        sevelamer carbonate (RENVELA) tablet 800 mg  800 mg Oral TID w/meals Hannah Christie PA-C   800 mg at 12/06/24 1245    sodium chloride 0.9 % infusion   Intravenous Continuous Clem Matt MD 75 mL/hr at 12/06/24 1426 New Bag at 12/06/24 1426    warfarin ANTICOAGULANT (COUMADIN) tablet 4 mg  4 mg Oral ONCE at 18:00 Ang Palmer MD        Warfarin Dose Required Daily - Pharmacist Managed  1 each Oral See Admin Instructions Hannah Christie PA-C         Current Facility-Administered Medications   Medication Dose Route Frequency Provider Last Rate Last Admin    allopurinol (ZYLOPRIM) tablet 200 mg  200 mg Oral QPM Hannah Christie PA-C   200 mg at 12/05/24 2007    atorvastatin (LIPITOR) tablet 40 mg  40 mg Oral At Bedtime Hannah Christie PA-C   40 mg at 12/05/24 2127    calcitRIOL (ROCALTROL) capsule 0.25 mcg  0.25 mcg Oral At Bedtime Hannah Christie PA-C   0.25 mcg at 12/05/24 2127    cinacalcet (SENSIPAR) tablet 60 mg  60 mg Oral Q Mon Wed Fri AM Hannah Christie PA-C        clopidogrel (PLAVIX) tablet 75 mg  75 mg Oral QPM Hannah Christie PA-C   75 mg at 12/05/24 2001    glucose gel 15-30 g  15-30 g Oral Q15 Min PRN Hannah Christie PA-C        Or    dextrose 50 % injection 25-50 mL  25-50 mL Intravenous Q15 Min PRN Hannah Christie PA-C        Or    glucagon injection 1 mg  1 mg Subcutaneous Q15 Min PRN Hannah Christie PA-C        dianeal PD LOW calcium-1.5% dex (calcium 2.5 mEq/L) 6,000 mL PERITONEAL DIALYSATE   Dialysis [facility] Supplied PD Duncan Lomeli DO        gentamicin (GARAMYCIN) 0.1 % cream   Topical Daily PRN Duncan Lomeli DO        gentamicin (GARAMYCIN) 0.1 % ointment   Topical Daily PRN Ang Palmer MD        insulin aspart (NovoLOG) injection (RAPID ACTING)  1-7 Units Subcutaneous TID Hannah Maldonado PA-C        insulin aspart  (NovoLOG) injection (RAPID ACTING)  1-5 Units Subcutaneous At Bedtime Hannah Christie PA-C        insulin glargine (LANTUS PEN) injection 20 Units  20 Units Subcutaneous At Bedtime Hannah Christie PA-C   20 Units at 12/05/24 2340    melatonin tablet 5 mg  5 mg Oral At Bedtime PRN Gemini Steinberg MD        metoprolol succinate ER (TOPROL XL) 24 hr tablet 25 mg  25 mg Oral Daily Alexus Sanchez PA-C        naloxone (NARCAN) injection 0.2 mg  0.2 mg Intravenous Q2 Min PRN Ang Palmer MD        Or    naloxone (NARCAN) injection 0.4 mg  0.4 mg Intravenous Q2 Min PRN Ang Palmer MD        Or    naloxone (NARCAN) injection 0.2 mg  0.2 mg Intramuscular Q2 Min PRN Ang Palmer MD        Or    naloxone (NARCAN) injection 0.4 mg  0.4 mg Intramuscular Q2 Min PRN Ang Palmer MD        ondansetron (ZOFRAN ODT) ODT tab 4 mg  4 mg Oral Q6H PRN Hannah Christie PA-C   4 mg at 12/06/24 0532    Or    ondansetron (ZOFRAN) injection 4 mg  4 mg Intravenous Q6H PRN Hannah Christie PA-C        oxyCODONE (ROXICODONE) tablet 5 mg  5 mg Oral Q6H PRN Hannah Christie PA-C        pantoprazole (PROTONIX) EC tablet 40 mg  40 mg Oral QAM AC Ang Palmer MD   40 mg at 12/06/24 0659    prochlorperazine (COMPAZINE) injection 5 mg  5 mg Intravenous Q6H PRN Hannah Christie PA-C        Or    prochlorperazine (COMPAZINE) tablet 5 mg  5 mg Oral Q6H PRN Hannah Christie PA-C        senna-docusate (SENOKOT-S/PERICOLACE) 8.6-50 MG per tablet 1 tablet  1 tablet Oral BID PRN Hannah Christie PA-C        Or    senna-docusate (SENOKOT-S/PERICOLACE) 8.6-50 MG per tablet 2 tablet  2 tablet Oral BID PRN Hannah Christie PA-C        sevelamer carbonate (RENVELA) tablet 800 mg  800 mg Oral TID w/meals Hannah Christie PA-C   800 mg at 12/06/24 1245    sodium chloride 0.9 % infusion   Intravenous Continuous Clem Matt MD 75  mL/hr at 12/06/24 1426 New Bag at 12/06/24 1426    warfarin ANTICOAGULANT (COUMADIN) tablet 4 mg  4 mg Oral ONCE at 18:00 Ang Palmer MD        Warfarin Dose Required Daily - Pharmacist Managed  1 each Oral See Admin Instructions Hannah Christie PA-C         Allergies   No Known Allergies    Social History    reports that he has quit smoking. His smoking use included cigarettes. He has never used smokeless tobacco. He reports that he does not currently use alcohol. He reports that he does not use drugs.    Family History            Review of Systems   A comprehensive review of system was performed and is negative other than that noted in the HPI.     Primary Care Physician   Chai Griffin

## 2024-12-06 NOTE — CONSULTS
"[facility] Nurse Inpatient Assessment     Consulted for: \"Toe wound, L groin and leg wounds following vascular surgery in 10/2024, no signs of infection; right heel\"    Summary: 1) Incision to left groin- No s/s of infection. Mostly approximated. 2) Wound to left great toe (s/p partial amputation 10/27 3) Suspected DTPI to left lateral foot, resolving, POA 4) Resolving DTPI to right heel, POA  [facility] nurse will follow up weekly.    Patient History (according to provider note(s):      \"Arnulfo Pritchett is a 64 year old male with PMHx of coronary artery disease with multiple stents, hypertension, hyperlipidemia, chronic paroxysmal A-fib (s/p ablation), ESRD on PD, secondary hyperparathyroidism, RCC (s/p nephrectomy), DM type II, gout, GERD, TALI who was recently admitted to the hospital from 10/15 - 11/9 for critical limb ischemia of the left lower extremity and ischemic left great toe wound, and again from 11/27/24-12/3/24 for persistent hypotension with initial concern for cardiogenic shock, but repeat coronary angiogram showed stable disease. Pt was briefly treated with dobumatine. He was followed by Nephrology and Cardiology. His peritoneal dialysis fluids and cardiac meds were adjusted. He was felt to be hypovolemic. He was seen outpatient by Cardiology this AM and noted SBPs in the 70s and symptomatic. Directed to the ED where he received fluids and one dose of midodrine with improvement. Registered to observation for further evaluation and treatment.\"    Assessment:      Areas visualized during today's visit: Heel, Foot, and Bilateral; left groin    Wound location: Left groin            Last photo: 12/6  Wound due to: Surgical Wound  Wound history/plan of care: LLE angiogram 10/27. Patient reports \"they were using Aquacel\" to the wound. Protected with Optifoam and Polymem dressing on writer's assessment.   Wound base: Incision with sutures still in place.Slight separation at proximal end " of incision with dry drainage and exudative debris. Separation at distal end of incision with moist wound base and filmy yellow slough     Palpation of the wound bed: normal      Drainage: small     Description of drainage: serosanguinous, yellow, and tan     Measurements (length x width x depth, in cm): ~ 10  x 0.5  x  0.4 cm cm      Tunneling: N/A     Undermining: N/A  Periwound skin: Intact      Color: pink      Temperature: normal   Odor: none  Pain: no grimacing or signs of discomfort, none  Pain interventions prior to dressing change: patient tolerated well, soaking, slow and gentle cares , and distraction  Treatment goal: Heal , Drainage control, Infection control/prevention, and Protection  STATUS: initial assessment  Supplies ordered: gathered, at bedside, supplies stored on unit, discussed with RN, and discussed with patient       Wound location: Left great toe        Last photo: 12/6  Wound due to: Surgical Wound  Wound history/plan of care: Patient underwent partial left hallux amputation 10/27. Dressed with gauze on writer's assessment.  Wound base: 100 % Maroon, Dry drainage, Dry, Bumpy , and Attached,    Palpation of the wound bed: firm      Drainage: small     Description of drainage: dried     Measurements (length x width x depth, in cm): 1  x 2.5  x  < 0.1 cm      Tunneling: N/A     Undermining: N/A  Periwound skin: Dry/scaly      Color: pink      Temperature: normal   Odor: none  Pain: denies , none  Pain interventions prior to dressing change: no significant pain present  and slow and gentle cares   Treatment goal: Heal , Infection control/prevention, Increase moisture , Protection, and Soften necrotic tissue  STATUS: initial assessment  Supplies ordered: gathered, at bedside, supplies stored on unit, discussed with RN, and discussed with patient      Wound location: Left lateral foot        Last photo: 126  Wound due to: Unknown Etiology  Wound history/plan of care: Suspect resolving DTPI given  location of wound. Appears to be epidermis over dry maroon drainage. Maroon discoloration at edges of wound are slightly more diffuse than at center of wound. Does not appear infected.   Wound base: 100 % Non-blanchable, Maroon, Epidermis, and Dry,     Palpation of the wound bed: firm      Drainage: none     Description of drainage: none     Measurements (length x width x depth, in cm): ~ 1  x 1.5  x  < 0.1 cm      Tunneling: N/A     Undermining: N/A  Periwound skin: Dry/scaly      Color: normal and consistent with surrounding tissue      Temperature: normal   Odor: none  Pain: denies , none  Pain interventions prior to dressing change: no significant pain present  and slow and gentle cares   Treatment goal: Protection  STATUS: initial assessment and evolving  Supplies ordered: gathered, supplies stored on unit, discussed with RN, and discussed with patient     Pressure Injury Location: Right heel        Last photo: 12/6  Wound type: Pressure Injury, resolving     Pressure Injury Stage: Deep Tissue Pressure Injury (DTPI), present on admission        Wound history/plan of care: Mepilex 4x4 in place.     Wound base: 100 % Non-blanchable, Maroon, Purple, and Epidermis,      Palpation of the wound bed: boggy      Drainage: none     Description of drainage: none     Measurements (length x width x depth, in cm) ~ 1.5  x 1  x  < 0.1 cm      Tunneling N/A     Undermining N/A  Periwound skin: Dry/scaly      Color: normal and consistent with surrounding tissue      Temperature: normal   Odor: none  Pain: denies , none  Pain intervention prior to dressing change: no significant pain present  and slow and gentle cares   Treatment goal: Protection  STATUS: initial assessment  Supplies ordered: gathered, at bedside, supplies stored on unit, discussed with RN, and discussed with patient     My PI Risk Assessment     Sensory Perception: 3 - Slightly Limited     Moisture: 3 - Occasionally moist      Activity: 3 - Walks occasionally  "     Mobility: 3 - Slightly limited      Nutrition: 2 - Probably inadequate      Friction/Shear: 1 - Problem     TOTAL: 15      Treatment Plan:     Left groinwound(s): Daily  and PRN for soiled/loose dressing  Cleanse area with Vashe and gauze. Can let Vashe-moist gauze soak area to loosen crusts.  Remove gauze and let skin dry.  Apply a layer of Silvasorb (#[id number]) to proximal end of incision where there are dry, adherent crusts.   Cut a strip of Aquacel Ag (#[id number]) and place into open area at distal end of incision.  Cover with Optifoam Gentle Border (#[id number]) dressings. Initial and date.     Left great toe wound: Every other day and PRN for soiled/loose dressing  Soak with Vashe-moist gauze. Pat dry.  Apply a layer of Silvasorb (#[id number]) to wound bed.   Cover with a foam dressing. Initial and date. Patient prefers the entire toe to be covered.     Left lateral foot and right heel wound: Daily and PRN for soiled/loose dressing  Cleanse with wound cleanser and gauze. Pat dry.  Apply skin prep (Cavilon no-sting) to alexsandra-wound skin.  Cover left foot wound with 2x2 Mepilex dressing. Apply 4x4 Mepilex to left heel wound.   Follow PIP Plan.     Pressure Injury Prevention (PIP) Plan:  -If patient is declining pressure injury prevention interventions: Explore reason why and address patient's concerns, Educate on pressure injury risk and prevention intervention(s), If patient is still declining, document \"informed refusal\" , and Ensure Care team is aware ( provider, charge nurse, etc)  -Mattress: Add DASH pump to mattress PRN moisture issues  -HOB: Maintain at or below 30 degrees, unless contraindicated  -Repositioning in bed: Every 1-2 hours , Left/right positioning; avoid supine, and Raise foot of bed prior to raising head of bed, to reduce patient sliding down (shear)  -Heels: Keep elevated off mattress  -Protective Dressing: None  -Positioning Equipment:  Pillows  -Chair positioning: Repositions self: " "patient to shift weight every 15 minutes and Do NOT use a donut for sitting (this increases pressure to smaller area and creates a higher potential for injury)    -Moisture Management: Perineal cleansing /protection: Follow Incontinence Protocol, Avoid brief in bed, Clean and dry skin folds with bathing , and Moisturize dry skin  -Under Devices: Inspect skin under all medical devices during skin inspection , Ensure tubes are stabilized without tension, and Ensure patient is not lying on medical devices or equipment when repositioned     Orders: Written    RECOMMEND PRIMARY TEAM ORDER: None, at this time  Education provided: plan of care, wound progress, and Infection prevention   Discussed plan of care with: Patient and Nurse  WO nurse follow-up plan: weekly  Notify WOC if wound(s) deteriorate.  Nursing to notify the Provider(s) and re-consult the [facility] Nurse if new skin concern.    DATA:     Current support surface: Standard  Standard gel mattress (Isoflex)  Containment of urine/stool: Continent of bladder and Continent of bowel  BMI: Body mass index is 23.83 kg/m .   Active diet order: Orders Placed This Encounter      Renal Diet (dialysis)     Output: I/O last 3 completed shifts:  In: 240 [P.O.:240]  Out: 300 [Urine:300]     Labs:   Recent Labs   Lab 12/06/24  0609 12/05/24  1554 12/05/24  1058   ALBUMIN  --   --  2.6*   HGB 10.3*  --  11.9*   INR 2.59*   < > 2.29*   WBC 6.1  --  7.9    < > = values in this interval not displayed.     Pressure injury risk assessment:   Sensory Perception: 3-->slightly limited  Moisture: 4-->rarely moist  Activity: 3-->walks occasionally  Mobility: 3-->slightly limited  Nutrition: 3-->adequate  Friction and Shear: 2-->potential problem  Matias Score: 18    Evelia Hoffmann RN, CWOCN  Please contact via [employer] at group \"[facility] nurse\"- M-F 8A-4P  "

## 2024-12-06 NOTE — PROGRESS NOTES
Cycler set up per protocol and per treatment orders      Results from previous treatment:  Effluent color and clarity: clear yellow effluent with no fibrin within  Total UF: -35ml  Initial Drain: 291ml  Avg Dwell: 1:17      Cycler Serial Number:  [id number]  Total Treatment Volume: 10,000mL  Total treatment time:  9hrs  Fill Volume: 2,000ml  Last Fill Volume: 0ml  Heater ba,000 mL;  Lot #: [id number]        ;  Expiration Date: 2026  Side Bag #1:  6,000 mL;  Lot #: [id number]     ;  Expiration Date: 2026  Cassette: Lot#: [id number], Expiration Date:      Number of cycles including final fill: 5  Dwell Time: 1:22 minutes  Last Fill Volume: 0ml  Initial Drain Alarm: 10ml  PD orders reviewed with Patient procedure and ESRD teaching done and questions answered.  Cycler ready for hook-up.    Report given to: ANN-MARIE Quiles RN

## 2024-12-07 VITALS
BODY MASS INDEX: 23.8 KG/M2 | TEMPERATURE: 98.7 F | HEIGHT: 72 IN | WEIGHT: 175.71 LBS | OXYGEN SATURATION: 97 % | RESPIRATION RATE: 18 BRPM | HEART RATE: 104 BPM | DIASTOLIC BLOOD PRESSURE: 91 MMHG | SYSTOLIC BLOOD PRESSURE: 134 MMHG

## 2024-12-07 LAB
GLUCOSE BLDC GLUCOMTR-MCNC: 122 MG/DL (ref 70–99)
GLUCOSE BLDC GLUCOMTR-MCNC: 153 MG/DL (ref 70–99)
INR PPP: 2.28 (ref 0.85–1.15)

## 2024-12-07 PROCEDURE — 82962 GLUCOSE BLOOD TEST: CPT

## 2024-12-07 PROCEDURE — 250N000013 HC RX MED GY IP 250 OP 250 PS 637: Performed by: PHYSICIAN ASSISTANT

## 2024-12-07 PROCEDURE — 85610 PROTHROMBIN TIME: CPT | Performed by: PHYSICIAN ASSISTANT

## 2024-12-07 PROCEDURE — 36415 COLL VENOUS BLD VENIPUNCTURE: CPT | Performed by: PHYSICIAN ASSISTANT

## 2024-12-07 PROCEDURE — G0378 HOSPITAL OBSERVATION PER HR: HCPCS

## 2024-12-07 PROCEDURE — 250N000013 HC RX MED GY IP 250 OP 250 PS 637: Performed by: INTERNAL MEDICINE

## 2024-12-07 PROCEDURE — 250N000013 HC RX MED GY IP 250 OP 250 PS 637

## 2024-12-07 PROCEDURE — 99239 HOSP IP/OBS DSCHRG MGMT >30: CPT | Performed by: INTERNAL MEDICINE

## 2024-12-07 RX ADMIN — SEVELAMER CARBONATE 800 MG: 800 TABLET, FILM COATED ORAL at 12:47

## 2024-12-07 RX ADMIN — PANTOPRAZOLE SODIUM 40 MG: 40 TABLET, DELAYED RELEASE ORAL at 07:56

## 2024-12-07 RX ADMIN — METOPROLOL SUCCINATE 25 MG: 25 TABLET, EXTENDED RELEASE ORAL at 07:58

## 2024-12-07 RX ADMIN — SEVELAMER CARBONATE 800 MG: 800 TABLET, FILM COATED ORAL at 07:56

## 2024-12-07 ASSESSMENT — ACTIVITIES OF DAILY LIVING (ADL)
ADLS_ACUITY_SCORE: 55

## 2024-12-07 NOTE — PROGRESS NOTES
Care Management Discharge Note    Discharge Date: 12/07/2024       Discharge Disposition: Transitional Care    Discharge Services: Transportation Services    Discharge DME:      Discharge Transportation: agency    Private pay costs discussed: transportation costs    Does the patient's insurance plan have a 3 day qualifying hospital stay waiver?  No    PAS Confirmation Code:    Patient/family educated on Medicare website which has current facility and service quality ratings: no    Education Provided on the Discharge Plan:    Persons Notified of Discharge Plans: patient, hospital staff, facility staff  Patient/Family in Agreement with the Plan: yes    Handoff Referral Completed: No, handoff not indicated or clinically appropriate    Additional Information:  Patient is medically ready for discharge and will be returning to [facility] and had a bed hold. Discharge orders faxed to facility. Medical transport scheduled for [id number].    Patient is in agreement with discharge plans.     GHASSAN Han, SW    [facility]

## 2024-12-07 NOTE — PROGRESS NOTES
vital signs normal or at patient baseline, no recurrent hypotension not met, still tachy   -Nephrology consult  following and s/o  -CC consult to return to [facility] TCU met, plan for discharge between 6313-9811 PM axel

## 2024-12-07 NOTE — PROGRESS NOTES
PRIMARY Concern: Hypotension  SAFETY RISK Concerns (fall risk, behaviors, etc.): fall      Aggression Tool Color: green  Isolation/Type: none  Tests/Procedures for NEXT shift: peritoneal dialysis; repeat orthostatics in the AM  Consults? (Pending/following, signed-off?) s/o  Where is patient from? (Home, TCU, etc.): TCU, [place]  Other Important info for NEXT shift: peritoneal dialysis to start at 9 PM per patient preference  Anticipated DC date & active delays: Tomorrow back to [facility] between 0669-1803 PM  _____________________________________________________________________________  SUMMARY NOTE:  Orientation/Cognitive: A/Ox4  Observation Goals (Met/ Not Met): not met  Mobility Level/Assist Equipment: SBA GBW  Antibiotics & Plan (IV/po, length of tx left): topical gentamicin prophylactic with PD  Pain Management: Denies pain  Tele/VS/O2: HTN, tachy, afebrile, on RA  ABNL Lab/BG: see results   Diet: renal   Bowel/Bladder: cont. Using urinal, PD  Skin Concerns: wounds to L inner leg, groin and L great toe, outter L foot suspected deep pressure injury, R heel pressure injury,  Drains/Devices: PIV, peritoneal dialysis port  Peritoneal dialysis overnight

## 2024-12-07 NOTE — PROGRESS NOTES
A&OX4, VSS on RA. Denies pain, n&V. Up AX1 with GB and walker. All wound cares done. Discharge instructions reviewed with pt, pt verbalized understanding. Stable at time of discharge. All belongings sent with pt.

## 2024-12-07 NOTE — PROGRESS NOTES
PRIMARY Concern: Hypotension  SAFETY RISK Concerns (fall risk, behaviors, etc.): fall      Aggression Tool Color: green  Isolation/Type: none  Tests/Procedures for NEXT shift: peritoneal dialysis; repeat orthostatics in the AM  Consults? (Pending/following, signed-off?) s/o  Where is patient from? (Home, TCU, etc.): TCU, [place]  Other Important info for NEXT shift: peritoneal dialysis to start at 9 PM per patient preference  Anticipated DC date & active delays: Tomorrow back to [facility] between 3868-2986 PM  _____________________________________________________________________________  SUMMARY NOTE:  Orientation/Cognitive: A/O x4  Observation Goals (Met/ Not Met): not met  Mobility Level/Assist Equipment: SBA with GB and walker  Antibiotics & Plan (IV/po, length of tx left): topical gentamicin prophylactic with PD  Pain Management: denies pain  Tele/VS/O2: vitally stable on RA ex tachy, orthos +, bolus given  ABNL Lab/BG: see results   Diet: renal   Bowel/Bladder: cont. Using urinal, PD  Skin Concerns: wounds to L inner leg, groin and L great toe, outter L foot suspected deep pressure injury, R heel pressure injury, seen by WOC today  Drains/Devices: PIV, peritoneal dialysis port  Patient Stated Goal for Today: rest

## 2024-12-09 ENCOUNTER — PATIENT OUTREACH (OUTPATIENT)
Dept: CARE COORDINATION | Facility: CLINIC | Age: 64
End: 2024-12-09
Payer: COMMERCIAL

## 2024-12-09 ENCOUNTER — PATIENT OUTREACH (OUTPATIENT)
Dept: CARDIOLOGY | Facility: CLINIC | Age: 64
End: 2024-12-09
Payer: COMMERCIAL

## 2024-12-09 ENCOUNTER — DOCUMENTATION ONLY (OUTPATIENT)
Dept: ANTICOAGULATION | Facility: CLINIC | Age: 64
End: 2024-12-09
Payer: COMMERCIAL

## 2024-12-09 DIAGNOSIS — Z79.01 LONG TERM CURRENT USE OF ANTICOAGULANT THERAPY: ICD-10-CM

## 2024-12-09 DIAGNOSIS — I95.0 IDIOPATHIC HYPOTENSION: ICD-10-CM

## 2024-12-09 DIAGNOSIS — I63.9 CVA (CEREBRAL VASCULAR ACCIDENT) (H): ICD-10-CM

## 2024-12-09 DIAGNOSIS — I48.91 ATRIAL FIBRILLATION, UNSPECIFIED TYPE (H): Primary | ICD-10-CM

## 2024-12-09 DIAGNOSIS — R06.02 SOB (SHORTNESS OF BREATH): ICD-10-CM

## 2024-12-09 DIAGNOSIS — Z86.73 H/O: STROKE: ICD-10-CM

## 2024-12-09 DIAGNOSIS — I25.5 ISCHEMIC CARDIOMYOPATHY: Primary | ICD-10-CM

## 2024-12-09 DIAGNOSIS — I50.22 CHRONIC SYSTOLIC HEART FAILURE (H): ICD-10-CM

## 2024-12-09 NOTE — TELEPHONE ENCOUNTER
Message received from cardiology during pt's recent admission from 12/5-12/7/24:         Message sent to scheduling.       Chart reviewed:               DAVID BookerN, RN 12:26 PM 12/09/24

## 2024-12-09 NOTE — PROGRESS NOTES
ANTICOAGULATION  MANAGEMENT: Discharge Review    Arnulfo Pritchett chart reviewed for anticoagulation continuity of care    Hospital Admission on 12/5/24 for Symptomatic orthostatic hypotension . This is most likely secondary to medications.  He was continued on reduced dose of Coreg 3.25 mg twice daily, but continue on  Entresto as well as torsemide 100 mg p.o. daily on discharge.  He make very little urine, so torsemide was discontinued by nephrology,  Cardiology recommended stopping the Entresto as well.  His Coreg was switched to oral metoprolol 25 mg XL as well.  Although his supine blood pressure improved, he still have some orthostatic hypotension.  Cardiology evaluated recommended some IV fluid for 3 hours 75 mL/h I agree with that.  At this time the patient is otherwise stable, will start metoprolol from tomorrow as he already got Coreg today.  If he remains stable he may be able to discharge later today, back to his TCU in stable improved condition    Discharge disposition: TCU    Results:    Recent labs: (last 7 days)     12/03/24  0536 12/05/24  1058 12/05/24  1554 12/06/24  0609 12/07/24  0838   INR 2.39* 2.29* 2.30* 2.59* 2.28*     Anticoagulation inpatient management:     anticoagulation calendar updated with inpatient dosing    Anticoagulation discharge instructions:     Warfarin dosing:  Take 4 mg Tues/Wed; and 3 mg ROW.    Bridging: No   INR goal change: No      Medication changes affecting anticoagulation: Yes: Start Metoprolol. Stop Coreg, Torsemide, and Entresto.     Additional factors affecting anticoagulation: Yes: Frequent hospitalizations.      PLAN     Agree with dosing adjustment on discharge    ACC will resume monitoring upon discharge from TCU    Anticoagulation Calendar updated    Jennifer AGUILAR RN  12/9/2024  Anticoagulation Clinic  [facility] for routing messages: merlin LAURA  ACC patient phone line: [phone]

## 2024-12-09 NOTE — PROGRESS NOTES
Clinic Care Coordination Contact    Situation: Patient chart reviewed by care coordinator.    Background: Patient in identified status. He was admitted to TCU on 12/3/2024.     Assessment: Patient was hospitalized 12/5/2024-12/7/2024. He was discharged back to the TCU on 12/7/2024.     Plan/Recommendations: Care Coordinator will await notification from facility staff informing of patient's discharge plans/needs. Care Coordinator will review chart and outreach to facility staff every 4 weeks and as needed.      Viridiana Ziegler RN Care Coordination   [facility]  Email: [email or web address]  Phone: [phone]

## 2024-12-09 NOTE — TELEPHONE ENCOUNTER
What needs to be scheduled: MRI, Cardiology office visit, and lab work    Orders in Epic: Yes    -If no, please enter appropriate order-    Location:  [place]    Is there a slot on hold:  Yes  Kylie Boateng PA-C    12/10: 12 PM for CMP, NT pro BNP              12:40 PM CORE Return with Kylie       Does scheduling need to reach out to patient to confirm: Yes    Are there future appts and/or testing that needs to be canceled:  No    Additional Comments:  Please call pt to see if he can come to [place] for CORE FU on 12/10 with Kylie Simmsclayton has openings in [place] on 12/12 as a second option if pt not able to come tomorrow, 12/10.     Also, see if pt can see [place] on 12/27 with BMP prior as well. Recommended for close follow-up      Please also schedule cMRI asap      Thanks!  Paige Morales, CINTIA, RN 12:20 PM 12/09/24

## 2024-12-10 NOTE — PROGRESS NOTES
[facility] [place] [facility] - [facility]    Received VM on CORE RN line from Lynne from [facility] who is returning Paige's call.     Brook Otto RN BSN   [facility] [place], [place]  [facility] Clinic Care Coordinator  12/10/24, 4:18 PM    [phone]

## 2024-12-10 NOTE — TELEPHONE ENCOUNTER
[facility] - [facility]O.R.E[facility] Clinic   Message received from scheduling stating pt is wanting cardiology to arrange for transport.     Pt discharged to [facility] TCU.     Call placed to [facility] T[facility] to discuss 12/12/24 and 12/27/24 appts.   Message left for nursing to call back to discuss transportation, lab draw and request MAR/VS to be faxed to cardiology.      Future Appointments   Date Time Provider Department Center   12/12/2024 11:20 AM Kylie Boateng PA-C SUUMHT [facility]   12/27/2024 12:00 PM BRANCH LAB [facility]   12/27/2024 12:40 PM Kylie Boateng PA-C [facility]HT [facility] CLIN   1/6/2025 12:30 PM SHVUS1 [facility]   1/6/2025  1:15 PM SHVUS1 [facility]   1/6/2025  2:00 PM Raul Gray MD [facility]   1/10/2025  1:00 PM Jarvis Justice DPM MARKOS [facility]   2/7/2025  1:00 PM Jarvis Justice DPM [facility]       CINTIA Booker, RN 12:54 PM 12/10/24

## 2024-12-11 NOTE — PROGRESS NOTES
[facility] - C.O.R.E. Clinic   Call to [facility]. Spoke to Jayla and Yessica. Nurses are in a meeting.   Discussed labs, transportation and obtaining MAR/VS.   Yessica ([facility]) is able to set up transport IF the pt/family let's facility know of appts. She will arrange transport for pt for 12/12 OV.   Labs can be drawn Mon-Fri.   She will fax copy of MAR and VS to [facility].       Faxed future appts and Morganza's address to Yessica at [phone].          Updated snapshot.    Future Appointments   Date Time Provider Department Center   12/12/2024 11:20 AM Kylie Boateng PA-C SUUMHT [facility] PSA CLIN   12/27/2024 12:00 PM [facility]   12/27/2024 12:40 PM Kylie Boateng PA-C SUUMHT [facility] PSA CLIN   1/6/2025 12:30 PM SHVUS1 [facility]   1/6/2025  1:15 PM SHVUS1 [facility]   1/6/2025  2:00 PM Raul Gray MD [facility]   1/7/2025  1:45 PM SCIMR1 [facility]MR [facility]   1/10/2025  1:00 PM Jarvis Justice DPM SH[facility]   2/7/2025  1:00 PM Jarvis Justice DPM [facility]         Paige Morales, CINTIA, RN 9:34 AM 12/11/24

## 2024-12-12 ENCOUNTER — OFFICE VISIT (OUTPATIENT)
Dept: CARDIOLOGY | Facility: CLINIC | Age: 64
End: 2024-12-12
Payer: MEDICARE

## 2024-12-12 VITALS
SYSTOLIC BLOOD PRESSURE: 92 MMHG | HEIGHT: 72 IN | HEART RATE: 59 BPM | WEIGHT: 173.8 LBS | DIASTOLIC BLOOD PRESSURE: 62 MMHG | OXYGEN SATURATION: 99 % | BODY MASS INDEX: 23.54 KG/M2

## 2024-12-12 DIAGNOSIS — I21.11 ST ELEVATION MYOCARDIAL INFARCTION INVOLVING RIGHT CORONARY ARTERY (H): ICD-10-CM

## 2024-12-12 DIAGNOSIS — I50.22 CHRONIC SYSTOLIC HEART FAILURE (H): ICD-10-CM

## 2024-12-12 DIAGNOSIS — I25.5 ISCHEMIC CARDIOMYOPATHY: ICD-10-CM

## 2024-12-12 DIAGNOSIS — I10 ESSENTIAL (PRIMARY) HYPERTENSION: ICD-10-CM

## 2024-12-12 DIAGNOSIS — I25.119 CORONARY ARTERY DISEASE INVOLVING NATIVE CORONARY ARTERY OF NATIVE HEART WITH ANGINA PECTORIS (H): ICD-10-CM

## 2024-12-12 DIAGNOSIS — I48.0 PAROXYSMAL ATRIAL FIBRILLATION (H): ICD-10-CM

## 2024-12-12 DIAGNOSIS — I50.22 CHRONIC SYSTOLIC CONGESTIVE HEART FAILURE (H): ICD-10-CM

## 2024-12-12 NOTE — LETTER
12/12/2024    Chai Griffin MD  [address] [facility] [address]    RE: Arnulfo Pritchett       Dear Colleague,     I had the pleasure of seeing Arnulfo Pritchett in the [facility].  Primary Cardiologist: Dr. Spears (previously with [facility])     Reason for Visit: Hospital follow up    History of Present Illness:   Arnulfo is a very pleasant 64 year old male who was admitted in 11/2024 directly from vascular surgery clinic due to significant hypotension with systolic blood pressure in the 60s.  He has past medical history notable for CAD (history of inferior STEMI in 2017 requiring PCI to the RCA; history of PCI to the OM and LCx in 2021; most recent ischemic evaluation with repeat coronary angiogram during his recent admission which showed severe two-vessel coronary artery disease, proximal RCA 70% stenosed and mid circumflex to distal circumflex 80% stenosed--given lack of angina and other acute events conservative approach was recommended; on aspirin, statin, and BB), Severe PAD (hx of left femoral endarterectomy and bovine pericardial patch angioplasty, left distal common femoral arterial/proximal superficial femoral artery and tibioperoneal trunk bypass 2024; hx of SFA popliteal balloon angioplasty and stenting in May 2024; hx of ligation of side branches of the left saphenous vein and temporary closure of the right groin with wound VAC October 2024; Ischemic great left toe status post amputation October 27, 2024  ), ESRD (hx of RCC with nephrectomy; on peritoneal dialysis; on torsemide 100 mg daily; diuretics and fluid status has been managed by nephrology), chronic severe HFrEF/ischemic cardiomyopathy (titration of GDMT has been challenging due to low blood pressures), T2DM, history of CVA, anemia of chronic disease, TALI (compliant with CPAP), GERD, and persistent atrial fibrillation (hx of ablation; rate control; on warfarin).     I met him on 12/5/2024 for hospital follow-up only 3 days  after his discharge.  Unfortunately his blood pressure was significantly low with systolic in the mid 70s.  He had associated lightheadedness nausea and 1 episode of emesis prior to his clinic visit with me.  We sent him to the ED for further evaluation.  He was admitted for further management.  He received IV diuresis.  His Entresto was discontinued and his beta-blocker was switched from carvedilol to metoprolol.  He returns today for follow-up.  He was also recommended to undergo cardiac MRI to rule out infiltrative process for his documented cardiomyopathy.    Assessment and Plan:  Arnulfo is a very pleasant 64 year old male with past medical history notable for CAD (history of inferior STEMI in 2017 requiring PCI to the RCA; history of PCI to the OM and LCx in 2021; most recent ischemic evaluation with repeat coronary angiogram during his recent admission which showed severe two-vessel coronary artery disease, proximal RCA 70% stenosed and mid circumflex to distal circumflex 80% stenosed--given lack of angina and other acute events conservative approach was recommended; on aspirin, statin, and BB), Severe PAD (hx of left femoral endarterectomy and bovine pericardial patch angioplasty, left distal common femoral arterial/proximal superficial femoral artery and tibioperoneal trunk bypass 2024; hx of SFA popliteal balloon angioplasty and stenting in May 2024; hx of ligation of side branches of the left saphenous vein and temporary closure of the right groin with wound VAC October 2024; Ischemic great left toe status post amputation October 27, 2024  ), ESRD (hx of RCC with nephrectomy; on peritoneal dialysis;fluid status has been managed by nephrology), chronic severe HFrEF/ischemic cardiomyopathy (titration of GDMT has been challenging due to low blood pressures), T2DM, history of CVA, anemia of chronic disease, TALI (compliant with CPAP), GERD, and persistent atrial fibrillation (hx of ablation; rate control; on  warfarin).      We are not able to add back Entresto unfortunately due to his low blood pressure.  He continues to be on as needed midodrine.  Unable to be on Jardiance due to poor creatinine clearance.    We will see him back in late January for follow-up.    40 minutes spent on the date of the encounter with chart review, patient visit, care coordination, and documentation.    The longitudinal plan of care for the diagnose(s)/condition(s) as documented were addressed during this visit. Due to the added complexity in care, I will continue to support Arnulfo Pritchett  in the subsequent management and with ongoing continuity of care.     This note was completed in part using Dragon voice recognition software. Although reviewed after completion, some word and grammatical errors may occur.    Orders this Visit:  No orders of the defined types were placed in this encounter.    No orders of the defined types were placed in this encounter.    There are no discontinued medications.      Encounter Diagnoses   Name Primary?     Chronic systolic heart failure (H)      Chronic systolic congestive heart failure (H)      ST elevation myocardial infarction involving right coronary artery (H)      Ischemic cardiomyopathy      Essential (primary) hypertension      Paroxysmal atrial fibrillation (H)      Coronary artery disease involving native coronary artery of native heart with angina pectoris (H)        CURRENT MEDICATIONS:  Current Outpatient Medications   Medication Sig Dispense Refill     acetaminophen (TYLENOL) 500 MG tablet Take 1,000 mg by mouth every 8 hours as needed.       allopurinol (ZYLOPRIM) 100 MG tablet Take 200 mg by mouth every evening       atorvastatin (LIPITOR) 40 MG tablet Take 40 mg by mouth at bedtime.       calcitRIOL (ROCALTROL) 0.25 MCG capsule Take 0.25 mcg by mouth at bedtime.       cinacalcet (SENSIPAR) 60 MG tablet Take 60 mg by mouth. Take 1 tablet (60 mg) three times a week: Monday, Wednesday, and  Friday nights       clopidogrel (PLAVIX) 75 MG tablet Take 75 mg by mouth every evening.       gentamicin (GARAMYCIN) 0.1 % external cream Apply topically daily as needed. Apply topically on peritoneal access site to prevent infections       glucose 40 % (400 mg/mL) gel Take 15-30 g by mouth every 15 minutes as needed for low blood sugar.       insulin glargine (LANTUS PEN) 100 UNIT/ML pen Inject 35 Units subcutaneously at bedtime.       melatonin 5 MG tablet Take 5 mg by mouth at bedtime       metoprolol succinate ER (TOPROL XL) 25 MG 24 hr tablet Take 1 tablet (25 mg) by mouth daily.       midodrine (PROAMATINE) 2.5 MG tablet Take 1 tablet (2.5 mg) by mouth once as needed (hypotension/dizziness post PD in AM during the day).       omeprazole (PRILOSEC) 20 MG DR capsule Take 20 mg by mouth daily.       polyethylene glycol (MIRALAX) 17 g packet Take 1 packet by mouth daily.       senna-docusate (SENOKOT-S/PERICOLACE) 8.6-50 MG tablet Take 1 tablet by mouth daily as needed for constipation.       SEVELAMER CARBONATE PO Take 800 mg by mouth 3 times daily (with meals)       warfarin ANTICOAGULANT (COUMADIN) 3 MG tablet Take 3 mg by mouth five times a week. On Mon, Thu, Fri, Sat, Sun       warfarin ANTICOAGULANT (COUMADIN) 4 MG tablet Take 4 mg by mouth twice a week. On Tue and Wed       B Complex-C-Folic Acid (DIALYVITE) TABS Take 1 tablet by mouth daily. (Patient not taking: Reported on 12/12/2024)       oxyCODONE (ROXICODONE) 5 MG tablet Take 1 tablet (5 mg) by mouth every 6 hours as needed for severe pain. (Patient not taking: Reported on 12/12/2024) 10 tablet 0       ALLERGIES   No Known Allergies    PAST MEDICAL HISTORY:  Past Medical History:   Diagnosis Date     CAD (coronary artery disease)      Chronic systolic heart failure (H)      ESRD (end stage renal disease) on dialysis (H)      Gastroesophageal reflux disease without esophagitis      Gout      HLD (hyperlipidemia)      TALI (obstructive sleep apnea)       PAD (peripheral artery disease) (H)     S/p RLE stenting of SFA/popliteal arteries     Type 2 diabetes mellitus with diabetic neuropathy (H)        PAST SURGICAL HISTORY:  Past Surgical History:   Procedure Laterality Date     AMPUTATE TOE(S) Left 10/27/2024    Procedure: AMPUTATION, TOE left great toe;  Surgeon: Haley Joseph DPM, Podiatry/Foot and Ankle Surgery;  Location: [facility] OR     BYPASS GRAFT FEMOROTIBIAL Left 10/25/2024    Procedure: LEFT FEMORAL ENDARTERECTOMY, LEFT FEMORAL TO TIBIAL PERONEAL TRUNK BYPASS WITH LEFT GREAT SEPHANEOUS VEIN, WOUND VAC PLACEMENT ON LEFT GROIN.;  Surgeon: Raul Gray MD;  Location: [facility] OR     BYPASS GRAFT FEMOROTIBIAL Left 10/27/2024    Procedure: CREATION, BYPASS, VASCULAR, FEMORAL TO TIBIAL REVISION OF BYPASS LEFT COMMON FEMORAL TO TIBIAL.;  Surgeon: Raul Gray MD;  Location:  OR     CV CORONARY ANGIOGRAM N/A 11/27/2024    Procedure: Coronary Angiogram;  Surgeon: Clem Matt MD;  Location:  HE[facility]ARD[facility] CATH LAB     CV LOWER EXTREMITY ANGIOGRAM LEFT Left 10/27/2024    Procedure: Angiogram Lower Extremity Left;  Surgeon: Raul Gray MD;  Location:  OR     CV PCI N/A 11/27/2024    Procedure: Percutaneous Coronary Intervention;  Surgeon: Clem Matt MD;  Location:  HEART CARD[facility] CATH LAB     IR LOWER EXTREMITY ANGIOGRAM LEFT  10/17/2024       FAMILY HISTORY:  No family history on file.    SOCIAL HISTORY:  Social History     Socioeconomic History     Marital status:      Spouse name: None     Number of children: None     Years of education: None     Highest education level: None   Tobacco Use     Smoking status: Former     Types: Cigarettes     Smokeless tobacco: Never   Vaping Use     Vaping status: Never Used   Substance and Sexual Activity     Alcohol use: Not Currently     Comment: quit 20 years     Drug use: Never     Social Drivers of Health     Financial Resource Strain: Low Risk  (12/5/2024)     Financial Resource Strain      Within the past 12 months, have you or your family members you live with been unable to get utilities (heat, electricity) when it was really needed?: No   Food Insecurity: Low Risk  (12/5/2024)    Food Insecurity      Within the past 12 months, did you worry that your food would run out before you got money to buy more?: No      Within the past 12 months, did the food you bought just not last and you didn t have money to get more?: No   Transportation Needs: Low Risk  (12/5/2024)    Transportation Needs      Within the past 12 months, has lack of transportation kept you from medical appointments, getting your medicines, non-medical meetings or appointments, work, or from getting things that you need?: No   Physical Activity: Unknown (10/8/2024)    Exercise Vital Sign      Days of Exercise per Week: 0 days   Stress: No Stress Concern Present (10/8/2024)    [employer] - Occupational Stress Questionnaire      Feeling of Stress : Only a little   Social Connections: Unknown (10/8/2024)    Social Connection and Isolation Panel [NHANES]      Frequency of Social Gatherings with Friends and Family: Patient declined   Interpersonal Safety: Low Risk  (11/28/2024)    Interpersonal Safety      Do you feel physically and emotionally safe where you currently live?: Yes      Within the past 12 months, have you been hit, slapped, kicked or otherwise physically hurt by someone?: No      Within the past 12 months, have you been humiliated or emotionally abused in other ways by your partner or ex-partner?: No   Housing Stability: Low Risk  (12/5/2024)    Housing Stability      Do you have housing? : Yes      Are you worried about losing your housing?: No       Review of Systems:  Skin:        Eyes:       ENT:       Respiratory:       Cardiovascular:       Gastroenterology:      Genitourinary:       Musculoskeletal:       Neurologic:       Psychiatric:       Heme/Lymph/Imm:     "   Endocrine:         Physical Exam:  Vitals: BP 92/62 (BP Location: Right arm, Patient Position: Sitting, Cuff Size: Adult Regular)   Pulse 59   Ht 1.829 m (6')   Wt 78.8 kg (173 lb 12.8 oz)   SpO2 99%   BMI 23.57 kg/m       GEN:  NAD. In wheelchair. Oxygen on room air.  NECK: No JVD  C/V:  Regular rate and rhythm, no murmur, rub or gallop.  RESP: Clear to auscultation bilaterally.  GI: Abdomen soft, nontender, nondistended.   EXTREM: Trace pitting LE edema.   NEURO: Alert and oriented, cooperative. No obvious focal deficits.   PSYCH: Normal affect.  SKIN: Warm and dry.       Recent Lab Results:  LIPID RESULTS:  No results found for: \"CHOL\", \"HDL\", \"LDL\", \"TRIG\", \"CHOLHDLRATIO\"    LIVER ENZYME RESULTS:  Lab Results   Component Value Date    AST 33 12/05/2024    ALT 17 12/05/2024       CBC RESULTS:  Lab Results   Component Value Date    WBC 6.1 12/06/2024    RBC 3.33 (L) 12/06/2024    HGB 10.3 (L) 12/06/2024    HCT 31.6 (L) 12/06/2024    MCV 95 12/06/2024    MCH 30.9 12/06/2024    MCHC 32.6 12/06/2024    RDW 19.0 (H) 12/06/2024     12/06/2024       BMP RESULTS:  Lab Results   Component Value Date     (L) 12/06/2024    POTASSIUM 3.5 12/06/2024    CHLORIDE 98 12/06/2024    CO2 27 12/06/2024    ANIONGAP 9 12/06/2024     (H) 12/07/2024    BUN 29.3 (H) 12/06/2024    CR 7.10 (H) 12/06/2024    GFRESTIMATED 8 (L) 12/06/2024    TERENCE 8.0 (L) 12/06/2024        A1C RESULTS:  Lab Results   Component Value Date    A1C 5.7 (H) 10/16/2024       INR RESULTS:  Lab Results   Component Value Date    INR 2.28 (H) 12/07/2024    INR 2.59 (H) 12/06/2024             Kylie Boateng PA-C  December 12, 2024     Thank you for allowing me to participate in the care of your patient.      Sincerely,     Kylie Boateng PA-C     [facility]  cc:   Chai Griffin MD  [address] [place] [address]      "

## 2024-12-12 NOTE — PROGRESS NOTES
Primary Cardiologist: Dr. Spears (previously with [facility])     Reason for Visit: Hospital follow up    History of Present Illness:   Arnulfo is a very pleasant 64 year old male who was admitted in 11/2024 directly from vascular surgery clinic due to significant hypotension with systolic blood pressure in the 60s.  He has past medical history notable for CAD (history of inferior STEMI in 2017 requiring PCI to the RCA; history of PCI to the OM and LCx in 2021; most recent ischemic evaluation with repeat coronary angiogram during his recent admission which showed severe two-vessel coronary artery disease, proximal RCA 70% stenosed and mid circumflex to distal circumflex 80% stenosed--given lack of angina and other acute events conservative approach was recommended; on aspirin, statin, and BB), Severe PAD (hx of left femoral endarterectomy and bovine pericardial patch angioplasty, left distal common femoral arterial/proximal superficial femoral artery and tibioperoneal trunk bypass 2024; hx of SFA popliteal balloon angioplasty and stenting in May 2024; hx of ligation of side branches of the left saphenous vein and temporary closure of the right groin with wound VAC October 2024; Ischemic great left toe status post amputation October 27, 2024  ), ESRD (hx of RCC with nephrectomy; on peritoneal dialysis; on torsemide 100 mg daily; diuretics and fluid status has been managed by nephrology), chronic severe HFrEF/ischemic cardiomyopathy (titration of GDMT has been challenging due to low blood pressures), T2DM, history of CVA, anemia of chronic disease, TALI (compliant with CPAP), GERD, and persistent atrial fibrillation (hx of ablation; rate control; on warfarin).     I met him on 12/5/2024 for hospital follow-up only 3 days after his discharge.  Unfortunately his blood pressure was significantly low with systolic in the mid 70s.  He had associated lightheadedness nausea and 1 episode of emesis prior to his clinic visit with me.   We sent him to the ED for further evaluation.  He was admitted for further management.  He received IV diuresis.  His Entresto was discontinued and his beta-blocker was switched from carvedilol to metoprolol.  He returns today for follow-up.  He was also recommended to undergo cardiac MRI to rule out infiltrative process for his documented cardiomyopathy.    Assessment and Plan:  Arnulfo is a very pleasant 64 year old male with past medical history notable for CAD (history of inferior STEMI in 2017 requiring PCI to the RCA; history of PCI to the OM and LCx in 2021; most recent ischemic evaluation with repeat coronary angiogram during his recent admission which showed severe two-vessel coronary artery disease, proximal RCA 70% stenosed and mid circumflex to distal circumflex 80% stenosed--given lack of angina and other acute events conservative approach was recommended; on aspirin, statin, and BB), Severe PAD (hx of left femoral endarterectomy and bovine pericardial patch angioplasty, left distal common femoral arterial/proximal superficial femoral artery and tibioperoneal trunk bypass 2024; hx of SFA popliteal balloon angioplasty and stenting in May 2024; hx of ligation of side branches of the left saphenous vein and temporary closure of the right groin with wound VAC October 2024; Ischemic great left toe status post amputation October 27, 2024  ), ESRD (hx of RCC with nephrectomy; on peritoneal dialysis;fluid status has been managed by nephrology), chronic severe HFrEF/ischemic cardiomyopathy (titration of GDMT has been challenging due to low blood pressures), T2DM, history of CVA, anemia of chronic disease, TALI (compliant with CPAP), GERD, and persistent atrial fibrillation (hx of ablation; rate control; on warfarin).      We are not able to add back Entresto unfortunately due to his low blood pressure.  He continues to be on as needed midodrine.  Unable to be on Jardiance due to poor creatinine clearance.    We will  see him back in late January for follow-up.    40 minutes spent on the date of the encounter with chart review, patient visit, care coordination, and documentation.    The longitudinal plan of care for the diagnose(s)/condition(s) as documented were addressed during this visit. Due to the added complexity in care, I will continue to support Arnulfo LAUREN Pritchett  in the subsequent management and with ongoing continuity of care.     This note was completed in part using Dragon voice recognition software. Although reviewed after completion, some word and grammatical errors may occur.    Orders this Visit:  No orders of the defined types were placed in this encounter.    No orders of the defined types were placed in this encounter.    There are no discontinued medications.      Encounter Diagnoses   Name Primary?    Chronic systolic heart failure (H)     Chronic systolic congestive heart failure (H)     ST elevation myocardial infarction involving right coronary artery (H)     Ischemic cardiomyopathy     Essential (primary) hypertension     Paroxysmal atrial fibrillation (H)     Coronary artery disease involving native coronary artery of native heart with angina pectoris (H)        CURRENT MEDICATIONS:  Current Outpatient Medications   Medication Sig Dispense Refill    acetaminophen (TYLENOL) 500 MG tablet Take 1,000 mg by mouth every 8 hours as needed.      allopurinol (ZYLOPRIM) 100 MG tablet Take 200 mg by mouth every evening      atorvastatin (LIPITOR) 40 MG tablet Take 40 mg by mouth at bedtime.      calcitRIOL (ROCALTROL) 0.25 MCG capsule Take 0.25 mcg by mouth at bedtime.      cinacalcet (SENSIPAR) 60 MG tablet Take 60 mg by mouth. Take 1 tablet (60 mg) three times a week: Monday, Wednesday, and Friday nights      clopidogrel (PLAVIX) 75 MG tablet Take 75 mg by mouth every evening.      gentamicin (GARAMYCIN) 0.1 % external cream Apply topically daily as needed. Apply topically on peritoneal access site to prevent  infections      glucose 40 % (400 mg/mL) gel Take 15-30 g by mouth every 15 minutes as needed for low blood sugar.      insulin glargine (LANTUS PEN) 100 UNIT/ML pen Inject 35 Units subcutaneously at bedtime.      melatonin 5 MG tablet Take 5 mg by mouth at bedtime      metoprolol succinate ER (TOPROL XL) 25 MG 24 hr tablet Take 1 tablet (25 mg) by mouth daily.      midodrine (PROAMATINE) 2.5 MG tablet Take 1 tablet (2.5 mg) by mouth once as needed (hypotension/dizziness post PD in AM during the day).      omeprazole (PRILOSEC) 20 MG DR capsule Take 20 mg by mouth daily.      polyethylene glycol (MIRALAX) 17 g packet Take 1 packet by mouth daily.      senna-docusate (SENOKOT-S/PERICOLACE) 8.6-50 MG tablet Take 1 tablet by mouth daily as needed for constipation.      SEVELAMER CARBONATE PO Take 800 mg by mouth 3 times daily (with meals)      warfarin ANTICOAGULANT (COUMADIN) 3 MG tablet Take 3 mg by mouth five times a week. On Mon, Thu, Fri, Sat, Sun      warfarin ANTICOAGULANT (COUMADIN) 4 MG tablet Take 4 mg by mouth twice a week. On Tue and Wed      B Complex-C-Folic Acid (DIALYVITE) TABS Take 1 tablet by mouth daily. (Patient not taking: Reported on 12/12/2024)      oxyCODONE (ROXICODONE) 5 MG tablet Take 1 tablet (5 mg) by mouth every 6 hours as needed for severe pain. (Patient not taking: Reported on 12/12/2024) 10 tablet 0       ALLERGIES   No Known Allergies    PAST MEDICAL HISTORY:  Past Medical History:   Diagnosis Date    CAD (coronary artery disease)     Chronic systolic heart failure (H)     ESRD (end stage renal disease) on dialysis (H)     Gastroesophageal reflux disease without esophagitis     Gout     HLD (hyperlipidemia)     TALI (obstructive sleep apnea)     PAD (peripheral artery disease) (H)     S/p RLE stenting of SFA/popliteal arteries    Type 2 diabetes mellitus with diabetic neuropathy (H)        PAST SURGICAL HISTORY:  Past Surgical History:   Procedure Laterality Date    AMPUTATE TOE(S) Left  10/27/2024    Procedure: AMPUTATION, TOE left great toe;  Surgeon: Haley Joseph DPM, Podiatry/Foot and Ankle Surgery;  Location: [facility] OR    BYPASS GRAFT FEMOROTIBIAL Left 10/25/2024    Procedure: LEFT FEMORAL ENDARTERECTOMY, LEFT FEMORAL TO TIBIAL PERONEAL TRUNK BYPASS WITH LEFT GREAT SEPHANEOUS VEIN, WOUND VAC PLACEMENT ON LEFT GROIN.;  Surgeon: Raul Gray MD;  Location: [facility] OR    BYPASS GRAFT FEMOROTIBIAL Left 10/27/2024    Procedure: CREATION, BYPASS, VASCULAR, FEMORAL TO TIBIAL REVISION OF BYPASS LEFT COMMON FEMORAL TO TIBIAL.;  Surgeon: Raul Gray MD;  Location:  OR    CV CORONARY ANGIOGRAM N/A 11/27/2024    Procedure: Coronary Angiogram;  Surgeon: Clem Matt MD;  Location:  [facility]    CV LOWER EXTREMITY ANGIOGRAM LEFT Left 10/27/2024    Procedure: Angiogram Lower Extremity Left;  Surgeon: Raul Gray MD;  Location:  OR    CV PCI N/A 11/27/2024    Procedure: Percutaneous Coronary Intervention;  Surgeon: Clem Matt MD;  Location:  [facility] LAB    IR LOWER EXTREMITY ANGIOGRAM LEFT  10/17/2024       FAMILY HISTORY:  No family history on file.    SOCIAL HISTORY:  Social History     Socioeconomic History    Marital status:      Spouse name: None    Number of children: None    Years of education: None    Highest education level: None   Tobacco Use    Smoking status: Former     Types: Cigarettes    Smokeless tobacco: Never   Vaping Use    Vaping status: Never Used   Substance and Sexual Activity    Alcohol use: Not Currently     Comment: quit 20 years    Drug use: Never     Social Drivers of Health     Financial Resource Strain: Low Risk  (12/5/2024)    Financial Resource Strain     Within the past 12 months, have you or your family members you live with been unable to get utilities (heat, electricity) when it was really needed?: No   Food Insecurity: Low Risk  (12/5/2024)    Food Insecurity     Within the past 12  months, did you worry that your food would run out before you got money to buy more?: No     Within the past 12 months, did the food you bought just not last and you didn t have money to get more?: No   Transportation Needs: Low Risk  (12/5/2024)    Transportation Needs     Within the past 12 months, has lack of transportation kept you from medical appointments, getting your medicines, non-medical meetings or appointments, work, or from getting things that you need?: No   Physical Activity: Unknown (10/8/2024)    Exercise Vital Sign     Days of Exercise per Week: 0 days   Stress: No Stress Concern Present (10/8/2024)    [employer] - Occupational Stress Questionnaire     Feeling of Stress : Only a little   Social Connections: Unknown (10/8/2024)    Social Connection and Isolation Panel [NHANES]     Frequency of Social Gatherings with Friends and Family: Patient declined   Interpersonal Safety: Low Risk  (11/28/2024)    Interpersonal Safety     Do you feel physically and emotionally safe where you currently live?: Yes     Within the past 12 months, have you been hit, slapped, kicked or otherwise physically hurt by someone?: No     Within the past 12 months, have you been humiliated or emotionally abused in other ways by your partner or ex-partner?: No   Housing Stability: Low Risk  (12/5/2024)    Housing Stability     Do you have housing? : Yes     Are you worried about losing your housing?: No       Review of Systems:  Skin:        Eyes:       ENT:       Respiratory:       Cardiovascular:       Gastroenterology:      Genitourinary:       Musculoskeletal:       Neurologic:       Psychiatric:       Heme/Lymph/Imm:       Endocrine:         Physical Exam:  Vitals: BP 92/62 (BP Location: Right arm, Patient Position: Sitting, Cuff Size: Adult Regular)   Pulse 59   Ht 1.829 m (6')   Wt 78.8 kg (173 lb 12.8 oz)   SpO2 99%   BMI 23.57 kg/m       GEN:  NAD. In wheelchair. Oxygen on room  "air.  NECK: No JVD  C/V:  Regular rate and rhythm, no murmur, rub or gallop.  RESP: Clear to auscultation bilaterally.  GI: Abdomen soft, nontender, nondistended.   EXTREM: Trace pitting LE edema.   NEURO: Alert and oriented, cooperative. No obvious focal deficits.   PSYCH: Normal affect.  SKIN: Warm and dry.       Recent Lab Results:  LIPID RESULTS:  No results found for: \"CHOL\", \"HDL\", \"LDL\", \"TRIG\", \"CHOLHDLRATIO\"    LIVER ENZYME RESULTS:  Lab Results   Component Value Date    AST 33 12/05/2024    ALT 17 12/05/2024       CBC RESULTS:  Lab Results   Component Value Date    WBC 6.1 12/06/2024    RBC 3.33 (L) 12/06/2024    HGB 10.3 (L) 12/06/2024    HCT 31.6 (L) 12/06/2024    MCV 95 12/06/2024    MCH 30.9 12/06/2024    MCHC 32.6 12/06/2024    RDW 19.0 (H) 12/06/2024     12/06/2024       BMP RESULTS:  Lab Results   Component Value Date     (L) 12/06/2024    POTASSIUM 3.5 12/06/2024    CHLORIDE 98 12/06/2024    CO2 27 12/06/2024    ANIONGAP 9 12/06/2024     (H) 12/07/2024    BUN 29.3 (H) 12/06/2024    CR 7.10 (H) 12/06/2024    GFRESTIMATED 8 (L) 12/06/2024    TERENCE 8.0 (L) 12/06/2024        A1C RESULTS:  Lab Results   Component Value Date    A1C 5.7 (H) 10/16/2024       INR RESULTS:  Lab Results   Component Value Date    INR 2.28 (H) 12/07/2024    INR 2.59 (H) 12/06/2024             Kylie Boateng PA-C  December 12, 2024     "

## 2024-12-12 NOTE — PATIENT INSTRUCTIONS
Call [facility] nurse for any questions or concerns:  [phone]   *If you have concerns after hours, please call [phone], option [phone] to speak with on call Cardiologist.    1. Medication changes and/or recommendations from today:  No changes    2. Follow up plan: Cancel 12/27 visit and follow up in mid to late January     3. Weigh yourself daily and write it down.     4. Call [facility] nurse if your weight is up more than 2 pounds in one day or 5 pounds in one week.     5. Call [facility] nurse if you feel more short of breath, have more abdominal bloating, or leg swelling.     6. Continue low sodium diet (less than 2000 mg daily). If you eat less salt, you will retain less fluid.     7. Alcohol can weaken your heart further. You should avoid alcohol or limit its use to special times, such as a holiday or birthday.      8. Do NOT take Aleve or ibuprofen without talking to your doctor first.      9. Lab Results:    Latest Reference Range & Units 12/06/24 06:09 12/06/24 08:50 12/06/24 10:56 12/06/24 17:47 12/06/24 21:22   Sodium 135 - 145 mmol/L 134 (L)       Potassium 3.4 - 5.3 mmol/L 3.5       Chloride 98 - 107 mmol/L 98       Carbon Dioxide (CO2) 22 - 29 mmol/L 27       Urea Nitrogen 8.0 - 23.0 mg/dL 29.3 (H)       Creatinine 0.67 - 1.17 mg/dL 7.10 (H)       GFR Estimate >60 mL/min/1.73m2 8 (L)       Calcium 8.8 - 10.4 mg/dL 8.0 (L)       Anion Gap 7 - 15 mmol/L 9       Glucose 70 - 99 mg/dL 116 (H)       GLUCOSE BY METER POCT 70 - 99 mg/dL  89 131 (H) 139 (H) 171 (H)   WBC 4.0 - 11.0 10e3/uL 6.1       Hemoglobin 13.3 - 17.7 g/dL 10.3 (L)       Hematocrit 40.0 - 53.0 % 31.6 (L)       Platelet Count 150 - 450 10e3/uL 236       RBC Count 4.40 - 5.90 10e6/uL 3.33 (L)       MCV 78 - 100 fL 95       MCH 26.5 - 33.0 pg 30.9       MCHC 31.5 - 36.5 g/dL 32.6       RDW 10.0 - 15.0 % 19.0 (H)       % Neutrophils % 63       % Lymphocytes % 17       % Monocytes % 10       % Eosinophils % 10       % Basophils % 1       Absolute Basophils  0.0 - 0.2 10e3/uL 0.0       Absolute Eosinophils 0.0 - 0.7 10e3/uL 0.6       Absolute Immature Granulocytes <=0.4 10e3/uL 0.0       Absolute Lymphocytes 0.8 - 5.3 10e3/uL 1.0       Absolute Monocytes 0.0 - 1.3 10e3/uL 0.6       % Immature Granulocytes % 1       Absolute Neutrophils 1.6 - 8.3 10e3/uL 3.8       Absolute NRBCs 10e3/uL 0.0       NRBCs per 100 WBC <1 /100 0       INR 0.85 - 1.15  2.59 (H)       (L): Data is abnormally low  (H): Data is abnormally high     [facility]: Cardiomyopathy, Optimization, Rehabilitation, Education  The [facility] is a heart failure specialty clinic within the [facility] where you will work with specialized nurse practitioners, physician assistants, doctors, and registered nurses. They are dedicated to helping patients with heart failure to carefully adjust medications, receive education, and learn who and when to call if symptoms develop. They specialize in helping you better understand your condition, slow the progression of your disease, improve the length and quality of your life, help you detect future heart problems before they become life threatening, and avoid hospitalizations.

## 2024-12-16 ENCOUNTER — MEDICAL CORRESPONDENCE (OUTPATIENT)
Dept: HEALTH INFORMATION MANAGEMENT | Facility: CLINIC | Age: 64
End: 2024-12-16

## 2024-12-16 ENCOUNTER — TELEPHONE (OUTPATIENT)
Dept: INTERNAL MEDICINE | Facility: CLINIC | Age: 64
End: 2024-12-16
Payer: COMMERCIAL

## 2024-12-16 NOTE — TELEPHONE ENCOUNTER
Home Care is calling regarding an established patient with [facility].       Requesting orders from: Chai Griffin  Provider is following patient: Yes  Is this a 60-day recertification request?  No    Orders Requested    Skilled Nursing  Request for initial certification (first set of orders)   Frequency:  3x/wk for 2 wks  then 2x/wk for 2 wks  The 1 time a week for 2 weeks    Physical Therapy  Request for initial evaluation and treatment (one time)       Verbal orders given for PT.  Information was gathered for Skilled nursing.  Provider review needed.  RN will contact Home Care with information after provider review.  Confirmed ok to leave a detailed message with call back.  Contact information confirmed and updated as needed.    Renita Jesus RN

## 2024-12-17 NOTE — TELEPHONE ENCOUNTER
Called and left detailed message on confidential voicemail of MARYSOL Duenas with verbal approval for the requested home care orders, per below note.     DAVID PetersN, RN

## 2024-12-23 ENCOUNTER — TELEPHONE (OUTPATIENT)
Dept: INTERNAL MEDICINE | Facility: CLINIC | Age: 64
End: 2024-12-23
Payer: COMMERCIAL

## 2024-12-23 NOTE — TELEPHONE ENCOUNTER
Detailed approval left on confidential home care voicemail    Renita Jesus RN on 12/23/2024 at 4:13 PM

## 2024-12-23 NOTE — TELEPHONE ENCOUNTER
Home Care is calling regarding an established patient with [facility].       Requesting orders from: Chai Griffin  Provider is following patient: Yes  Is this a 60-day recertification request?  No    Orders Requested    Skilled Nursing  Request for delay in care, service is not able to be provided within same scheduled day.   Patient requested to not have his visit on 12/25/24 due to Rosaura. This will not be rescheduled      Information was gathered and will be sent to provider for review.  RN will contact Home Care with information after provider review.  Confirmed ok to leave a detailed message with call back.  Contact information confirmed and updated as needed.    Ken Reno, MARYSOL

## 2024-12-24 ENCOUNTER — ANTICOAGULATION THERAPY VISIT (OUTPATIENT)
Dept: ANTICOAGULATION | Facility: CLINIC | Age: 64
End: 2024-12-24
Payer: COMMERCIAL

## 2024-12-24 ENCOUNTER — TELEPHONE (OUTPATIENT)
Dept: INTERNAL MEDICINE | Facility: CLINIC | Age: 64
End: 2024-12-24
Payer: COMMERCIAL

## 2024-12-24 DIAGNOSIS — I48.91 ATRIAL FIBRILLATION (H): ICD-10-CM

## 2024-12-24 DIAGNOSIS — Z86.73 H/O: STROKE: Primary | ICD-10-CM

## 2024-12-24 DIAGNOSIS — I63.9 CVA (CEREBRAL VASCULAR ACCIDENT) (H): ICD-10-CM

## 2024-12-24 DIAGNOSIS — Z79.01 LONG TERM CURRENT USE OF ANTICOAGULANT THERAPY: ICD-10-CM

## 2024-12-24 NOTE — PROGRESS NOTES
Last INR 2.28 on 12/7/24    post amputation October 27, 2024  ESRD (hx of RCC with nephrectomy; on peritoneal dialysis  persistent atrial fibrillation (hx of ablation; rate control; on warfarin).     We are not able to add back Entresto unfortunately due to his low blood pressure.  He continues to be on as needed midodrine.  Unable to be on Jardiance due to poor creatinine clearance.     Take 4 mg Tues/Wed; and 3 mg ROW.     1.0 INR per Home care nurse?  Yulissa Ellis. Left message for her to give us a call back.

## 2024-12-24 NOTE — TELEPHONE ENCOUNTER
Please see the December 24, 2024 Anticoagulation encounter for further information.    12/24: 10 mg; Otherwise 5 mg every Sun, Tue, Thu; 2.5 mg all other days    Next INR check:  12/27/2024       Abi Oakley RN    [facility]

## 2024-12-24 NOTE — PROGRESS NOTES
Sent a Mychart to patient with the dosing till recheck on 12/27/2024. Unable to reach home care, patient or TCU for further clarity today.    Abi Oakley RN    [facility]agulation [facility]

## 2024-12-24 NOTE — TELEPHONE ENCOUNTER
Home care RN calling. Having questions about patient Warfarin dosing. Home care has orders for 3 mg to be taken Monday, Thursday, Friday, Saturday, and Sunday. Home care does not have orders for Tuesday or Wednesday. Chart Review shows patient should take 4 mg on Tuesday and Wednesday.     Home care states patient does not have 3 mg strength tablet at home, but does have remaining 5 mg tablet at home. He also reports missing 2 doses last week.     Home care did fingerstick INR. Result was 1.0. Not showing signs of clotting/chest pain.     Please advise. Home care will see patient next on Friday. They can draw and bring INR if wanting for chart. Preferred pharmacy is [facility] [place].      Routing to PCP and Anticoag Pool.     Kaiser Neumann RN on 12/24/2024 at 10:05 AM

## 2024-12-24 NOTE — TELEPHONE ENCOUNTER
Left a message for Yulissa to give us a call back for full assessment and questions.    Clover Gomez, RN  Anticoagulation Nurse - [facility], [place]

## 2024-12-26 ENCOUNTER — ANTICOAGULATION THERAPY VISIT (OUTPATIENT)
Dept: ANTICOAGULATION | Facility: CLINIC | Age: 64
End: 2024-12-26
Payer: COMMERCIAL

## 2024-12-26 DIAGNOSIS — I63.9 CVA (CEREBRAL VASCULAR ACCIDENT) (H): ICD-10-CM

## 2024-12-26 DIAGNOSIS — Z79.01 LONG TERM CURRENT USE OF ANTICOAGULANT THERAPY: ICD-10-CM

## 2024-12-26 DIAGNOSIS — I48.91 ATRIAL FIBRILLATION, UNSPECIFIED TYPE (H): Primary | ICD-10-CM

## 2024-12-26 DIAGNOSIS — Z86.73 H/O: STROKE: ICD-10-CM

## 2024-12-26 LAB
INR (EXTERNAL): 1 (ref 0.9–1.1)
INR (EXTERNAL): 1.1 (ref 0.9–1.1)

## 2024-12-26 NOTE — PROGRESS NOTES
"ANTICOAGULATION MANAGEMENT     Arnulfo Pritchett 64 year old male is on warfarin with subtherapeutic INR result. (Goal INR 2.0-3.0)    Recent labs: (last 7 days)     12/26/24  1430   INR 1.1       ASSESSMENT     Source(s): Chart Review and Home Care/Facility Nurse     Warfarin doses taken: Less warfarin taken than planned which may be affecting INR. Unable to reach TCU to get the dosing patient received while there. Patient states he was discharged from TCU \"around 12/14/24\" as was discharged on 3 mg daily. Although, this is different than what is reported in 12/24/24 [facility] encounter. Of note, patient also reports missing two doses on warfarin last week, but unclear of which dates. Writer has concerns that patient may be a poor historian. Patient reports he started taking 7.5 mg on Monday, 5 all other days on 12/22/24 because \"that is what I was on before TCU\". Patient  states he did not receive [facility] instructions on warfarin despite reading [facility] mychart on 12/24/24.  Diet: No new diet changes identified  Medication/supplement changes: None noted  New illness, injury, or hospitalization: Yes: patient was admitted 12/5/24-12/7/24 due to orthostatic hypotension secondary to medications. Patient then went to TCU and was discharged from TCU on 12/14/24.   Signs or symptoms of bleeding or clotting: No  Previous result: Subtherapeutic  Additional findings: Will route to primary care provider as an Fyi regarding concerns about self dosing        PLAN     Recommended plan for temporary change(s) affecting INR     Dosing Instructions: booster dose then Increase your warfarin dose (40% change) with next INR in 5 days (compared to maintenance dose previously set, however, dose reduction compared to what patient was taking prior to admission)    Summary  As of 12/26/2024      Full warfarin instructions:  12/26: 10 mg; Otherwise 5 mg every day   Next INR check:  12/31/2024               Telephone call with Jennifer home care nurse who " verbalizes understanding and agrees to plan    Orders given to  Homecare nurse/facility to recheck    Education provided: Please call back if any changes to your diet, medications or how you've been taking warfarin  Taking warfarin: Importance of taking warfarin as instructed  Symptom monitoring: monitoring for bleeding signs and symptoms and monitoring for clotting signs and symptoms    Plan made with [facility] Pharmacist Di Rose RN  12/26/2024  Anticoagulation Clinic  [facility] for routing messages: merlin ECHEVERRIA  [facility] patient phone line: [phone]        _______________________________________________________________________     Anticoagulation Episode Summary       Current INR goal:  2.0-3.0   TTR:  26.9% (3 wk)   Target end date:  Indefinite   Send INR reminders to:  ULI ECHEVERRIA    Indications    Atrial fibrillation (H) [I48.91]  H/O: stroke [Z86.73]  CVA (cerebral vascular accident) (H) [I63.9]  Long term current use of anticoagulant therapy [Z79.01]             Comments:  Home care back to [place] when discharged             Anticoagulation Care Providers       Provider Role Specialty Phone number    Chai Griffin MD Referring Internal Medicine [phone]

## 2024-12-27 PROBLEM — E44.0 MODERATE PROTEIN-CALORIE MALNUTRITION: Status: ACTIVE | Noted: 2024-12-27

## 2024-12-30 ENCOUNTER — ANTICOAGULATION THERAPY VISIT (OUTPATIENT)
Dept: ANTICOAGULATION | Facility: CLINIC | Age: 64
End: 2024-12-30
Payer: COMMERCIAL

## 2024-12-30 DIAGNOSIS — Z86.73 H/O: STROKE: ICD-10-CM

## 2024-12-30 DIAGNOSIS — Z79.01 LONG TERM CURRENT USE OF ANTICOAGULANT THERAPY: ICD-10-CM

## 2024-12-30 DIAGNOSIS — I63.9 CVA (CEREBRAL VASCULAR ACCIDENT) (H): ICD-10-CM

## 2024-12-30 DIAGNOSIS — I48.91 ATRIAL FIBRILLATION, UNSPECIFIED TYPE (H): Primary | ICD-10-CM

## 2024-12-30 LAB — INR (EXTERNAL): 1.3 (ref 0.9–1.1)

## 2024-12-30 NOTE — PROGRESS NOTES
ANTICOAGULATION MANAGEMENT     Arnulfo Pritchett 64 year old male is on warfarin with subtherapeutic INR result. (Goal INR 2.0-3.0)    Recent labs: (last 7 days)     12/30/24  1333   INR 1.3*       ASSESSMENT     Source(s): Chart Review and Home Care/Facility Nurse     Warfarin doses taken: Missed dose(s) may be affecting INR. Home care is now setting up medications for patient.  Diet: No new diet changes identified  Medication/supplement changes: None noted  New illness, injury, or hospitalization: No  Signs or symptoms of bleeding or clotting: No  Previous result: Subtherapeutic  Additional findings: None       PLAN     Recommended plan for temporary change(s) affecting INR     Dosing Instructions: booster dose then continue your current warfarin dose with next INR in 3 days       Summary  As of 12/30/2024      Full warfarin instructions:  12/30: 10 mg; Otherwise 5 mg every day   Next INR check:  1/2/2025               Telephone call with Yulissa home care nurse who verbalizes understanding and agrees to plan    Orders given to  Homecare nurse/facility to recheck    Education provided: Please call back if any changes to your diet, medications or how you've been taking warfarin  Symptom monitoring: monitoring for bleeding signs and symptoms, monitoring for clotting signs and symptoms, and monitoring for stroke signs and symptoms  Importance of notifying anticoagulation clinic for: changes in medications; a sooner lab recheck maybe needed    Plan made with [facility] Pharmacist Di Rose RN  12/30/2024  Anticoagulation Clinic  [facility] for routing messages: merlin ECHEVERRIA  [facility] patient phone line: [phone]        _______________________________________________________________________     Anticoagulation Episode Summary       Current INR goal:  2.0-3.0   TTR:  22.6% (3.6 wk)   Target end date:  Indefinite   Send INR reminders to:  ULI ECHEVERRIA    Indications    Atrial fibrillation (H)  [I48.91]  H/O: stroke [Z86.73]  CVA (cerebral vascular accident) (H) [I63.9]  Long term current use of anticoagulant therapy [Z79.01]             Comments:  Home care back to [place] when discharged             Anticoagulation Care Providers       Provider Role Specialty Phone number    Chai Griffin MD Referring Internal Medicine [phone]

## 2025-01-02 ENCOUNTER — TELEPHONE (OUTPATIENT)
Dept: INTERNAL MEDICINE | Facility: CLINIC | Age: 65
End: 2025-01-02

## 2025-01-02 ENCOUNTER — ANTICOAGULATION THERAPY VISIT (OUTPATIENT)
Dept: ANTICOAGULATION | Facility: CLINIC | Age: 65
End: 2025-01-02

## 2025-01-02 ENCOUNTER — TELEPHONE (OUTPATIENT)
Dept: CARDIOLOGY | Facility: CLINIC | Age: 65
End: 2025-01-02

## 2025-01-02 ENCOUNTER — OFFICE VISIT (OUTPATIENT)
Dept: PODIATRY | Facility: CLINIC | Age: 65
End: 2025-01-02
Payer: MEDICARE

## 2025-01-02 VITALS
BODY MASS INDEX: 23.55 KG/M2 | OXYGEN SATURATION: 96 % | SYSTOLIC BLOOD PRESSURE: 119 MMHG | DIASTOLIC BLOOD PRESSURE: 83 MMHG | HEIGHT: 72 IN | WEIGHT: 173.9 LBS | HEART RATE: 105 BPM | TEMPERATURE: 98.1 F

## 2025-01-02 DIAGNOSIS — E11.42 DIABETIC POLYNEUROPATHY ASSOCIATED WITH TYPE 2 DIABETES MELLITUS (H): Primary | ICD-10-CM

## 2025-01-02 DIAGNOSIS — S98.112A AMPUTATION OF LEFT GREAT TOE (H): ICD-10-CM

## 2025-01-02 DIAGNOSIS — Z86.73 H/O: STROKE: ICD-10-CM

## 2025-01-02 DIAGNOSIS — I63.9 CVA (CEREBRAL VASCULAR ACCIDENT) (H): ICD-10-CM

## 2025-01-02 DIAGNOSIS — I73.9 PAD (PERIPHERAL ARTERY DISEASE) (H): ICD-10-CM

## 2025-01-02 DIAGNOSIS — Z99.2 DEPENDENCE ON RENAL DIALYSIS (H): ICD-10-CM

## 2025-01-02 DIAGNOSIS — Z79.01 LONG TERM CURRENT USE OF ANTICOAGULANT THERAPY: ICD-10-CM

## 2025-01-02 DIAGNOSIS — I48.91 ATRIAL FIBRILLATION, UNSPECIFIED TYPE (H): Primary | ICD-10-CM

## 2025-01-02 LAB — INR (EXTERNAL): 1.9 (ref 0.9–1.1)

## 2025-01-02 ASSESSMENT — PAIN SCALES - GENERAL: PAINLEVEL_OUTOF10: NO PAIN (0)

## 2025-01-02 NOTE — PROGRESS NOTES
Chief Complaint   Patient presents with    Surgical Followup     (9w4d) DOS 10/27/2024 - AMPUTATION, TOE left great toe, Surgeon Haley Joseph DPM    Diabetes     Type 2     HPI:  Arnulfo Pritchett is a 63 year old male who is seen in consultation at the request of Chai Griffin MD    Pt presents for eval of:   (Onset, Location, L/R, Character, Treatments, Injury if yes)     DOS 10/27/2024 - AMPUTATION, TOE left great toe, Surgeon Haley Joseph DPM    Retired.    ROS:  10 point ROS neg other than the symptoms noted above in the HPI.    Patient Active Problem List   Diagnosis    Dependence on renal dialysis (H)    Type 2 diabetes mellitus with left diabetic foot ulcer (H)    Anaphylactic shock, unspecified, initial encounter    Anemia due to chronic kidney disease, on chronic dialysis (H)    Anemia in chronic kidney disease    Atrial fibrillation (H)    Cardiomyopathy (H)    Chronic gout due to renal impairment of multiple sites without tophus    Chronic kidney disease, stage IV (severe) (H)    Coagulation defect (H)    Coronary artery disease involving native coronary artery of native heart with angina pectoris (H)    Coronary atherosclerosis    Dyslipidemia    ED (erectile dysfunction)    ESRD on peritoneal dialysis (H)    Essential (primary) hypertension    Gout    H/O: stroke    Hemoptysis    Hypercoagulable state due to chronic atrial fibrillation (H)    Hyperglycemia, drug-induced    Hyperkalemia    Mixed hypercholesterolemia and hypertriglyceridemia    Hypertensive urgency    Iron deficiency    Iron deficiency anemia, unspecified    Ischemic cardiomyopathy    Ischemic leg    Ischemic stroke (H)    CVA (cerebral vascular accident) (H)    Kidney transplant candidate    Left homonymous hemianopsia    Nephrotic range proteinuria    TALI on CPAP    Sleep apnea    Other disorders of phosphorus metabolism    Other specified abnormal findings of blood chemistry    Pain, unspecified    Peritoneal dialysis catheter in  situ (H)    Personal history of other malignant neoplasm of kidney    Pleural effusion, not elsewhere classified    Renal mass    Right shoulder tendinitis    Secondary hyperparathyroidism of renal origin (H)    ST elevation myocardial infarction involving right coronary artery (H)    Chronic systolic congestive heart failure (H)    Staphylococcus aureus pneumonia (H)    Systolic heart failure (H)    Venous stasis    Diabetes mellitus due to underlying condition, controlled, with chronic kidney disease on chronic dialysis, with long-term current use of insulin (H)    Diabetes mellitus type 2, insulin dependent (H)    Type 2 diabetes mellitus with chronic kidney disease (H)    Type 2 diabetes mellitus (H)    Type 2 DM with hypertension and ESRD on dialysis (H)    Long term current use of anticoagulant therapy    Foot infection    Local skin infection    NSTEMI (non-ST elevated myocardial infarction) (H)    Orthostatic hypotension    Hypovolemia    Moderate protein-calorie malnutrition (H)       PAST MEDICAL HISTORY:   Past Medical History:   Diagnosis Date    CAD (coronary artery disease)     Chronic systolic heart failure (H)     ESRD (end stage renal disease) on dialysis (H)     Gastroesophageal reflux disease without esophagitis     Gout     HLD (hyperlipidemia)     TALI (obstructive sleep apnea)     PAD (peripheral artery disease) (H)     S/p RLE stenting of SFA/popliteal arteries    Type 2 diabetes mellitus with diabetic neuropathy (H)         PAST SURGICAL HISTORY:   Past Surgical History:   Procedure Laterality Date    AMPUTATE TOE(S) Left 10/27/2024    Procedure: AMPUTATION, TOE left great toe;  Surgeon: Haley Joseph DPM, Podiatry/Foot and Ankle Surgery;  Location: [facility] OR    BYPASS GRAFT FEMOROTIBIAL Left 10/25/2024    Procedure: LEFT FEMORAL ENDARTERECTOMY, LEFT FEMORAL TO TIBIAL PERONEAL TRUNK BYPASS WITH LEFT GREAT SEPHANEOUS VEIN, WOUND VAC PLACEMENT ON LEFT GROIN.;  Surgeon: Raul Gray MD;   Location: [facility] OR    BYPASS GRAFT FEMOROTIBIAL Left 10/27/2024    Procedure: CREATION, BYPASS, VASCULAR, FEMORAL TO TIBIAL REVISION OF BYPASS LEFT COMMON FEMORAL TO TIBIAL.;  Surgeon: Raul Gray MD;  Location:  OR    CV CORONARY ANGIOGRAM N/A 11/27/2024    Procedure: Coronary Angiogram;  Surgeon: Clem Matt MD;  Location:  [facility]    CV LOWER EXTREMITY ANGIOGRAM LEFT Left 10/27/2024    Procedure: Angiogram Lower Extremity Left;  Surgeon: Raul Gray MD;  Location:  OR    CV PCI N/A 11/27/2024    Procedure: Percutaneous Coronary Intervention;  Surgeon: Clem Matt MD;  Location:  [facility]ATH [facility]    IR LOWER EXTREMITY ANGIOGRAM LEFT  10/17/2024        MEDICATIONS:   Current Outpatient Medications:     acetaminophen (TYLENOL) 500 MG tablet, Take 1,000 mg by mouth every 8 hours as needed., Disp: , Rfl:     allopurinol (ZYLOPRIM) 100 MG tablet, Take 200 mg by mouth every evening, Disp: , Rfl:     atorvastatin (LIPITOR) 40 MG tablet, Take 40 mg by mouth at bedtime., Disp: , Rfl:     B Complex-C-Folic Acid (DIALYVITE) TABS, Take 1 tablet by mouth daily., Disp: , Rfl:     calcitRIOL (ROCALTROL) 0.25 MCG capsule, Take 0.25 mcg by mouth at bedtime., Disp: , Rfl:     cinacalcet (SENSIPAR) 60 MG tablet, Take 60 mg by mouth. Take 1 tablet (60 mg) three times a week: Monday, Wednesday, and Friday nights, Disp: , Rfl:     clopidogrel (PLAVIX) 75 MG tablet, Take 1 tablet (75 mg) by mouth every evening., Disp: 90 tablet, Rfl: 3    gentamicin (GARAMYCIN) 0.1 % external cream, Apply topically daily as needed. Apply topically on peritoneal access site to prevent infections, Disp: , Rfl:     glucose 40 % (400 mg/mL) gel, Take 15-30 g by mouth every 15 minutes as needed for low blood sugar., Disp: , Rfl:     insulin glargine (LANTUS PEN) 100 UNIT/ML pen, Inject 35 Units subcutaneously at bedtime., Disp: , Rfl:     melatonin 5 MG tablet, Take 5 mg by mouth at  bedtime, Disp: , Rfl:     metoprolol succinate ER (TOPROL XL) 25 MG 24 hr tablet, Take 1 tablet (25 mg) by mouth daily., Disp: 90 tablet, Rfl: 3    midodrine (PROAMATINE) 2.5 MG tablet, Take 1 tablet (2.5 mg) by mouth once as needed (hypotension/dizziness post PD in AM during the day)., Disp: , Rfl:     omeprazole (PRILOSEC) 20 MG DR capsule, Take 20 mg by mouth daily., Disp: , Rfl:     SEVELAMER CARBONATE PO, Take 800 mg by mouth 3 times daily (with meals), Disp: , Rfl:     warfarin ANTICOAGULANT (COUMADIN) 5 MG tablet, Take 1 tablet (5 mg) by mouth daily., Disp: 90 tablet, Rfl: 1     ALLERGIES:  No Known Allergies     SOCIAL HISTORY:   Social History     Socioeconomic History    Marital status:      Spouse name: Not on file    Number of children: Not on file    Years of education: Not on file    Highest education level: Not on file   Occupational History    Not on file   Tobacco Use    Smoking status: Former     Types: Cigarettes    Smokeless tobacco: Never   Vaping Use    Vaping status: Never Used   Substance and Sexual Activity    Alcohol use: Not Currently     Comment: quit 20 years    Drug use: Never    Sexual activity: Not on file   Other Topics Concern    Not on file   Social History Narrative    Not on file     Social Drivers of Health     Financial Resource Strain: Low Risk  (12/5/2024)    Financial Resource Strain     Within the past 12 months, have you or your family members you live with been unable to get utilities (heat, electricity) when it was really needed?: No   Food Insecurity: Low Risk  (12/5/2024)    Food Insecurity     Within the past 12 months, did you worry that your food would run out before you got money to buy more?: No     Within the past 12 months, did the food you bought just not last and you didn t have money to get more?: No   Transportation Needs: Low Risk  (12/5/2024)    Transportation Needs     Within the past 12 months, has lack of transportation kept you from medical  appointments, getting your medicines, non-medical meetings or appointments, work, or from getting things that you need?: No   Physical Activity: Unknown (10/8/2024)    Exercise Vital Sign     Days of Exercise per Week: 0 days     Minutes of Exercise per Session: Not on file   Stress: No Stress Concern Present (10/8/2024)    Citizen of [place] [employer] Occupational Health - Occupational Stress Questionnaire     Feeling of Stress : Only a little   Social Connections: Unknown (10/8/2024)    Social Connection and Isolation Panel [NHANES]     Frequency of Communication with Friends and Family: Not on file     Frequency of Social Gatherings with Friends and Family: Patient declined     Attends Alevism Services: Not on file     Active Member of Clubs or Organizations: Not on file     Attends Club or Organization Meetings: Not on file     Marital Status: Not on file   Interpersonal Safety: Low Risk  (11/28/2024)    Interpersonal Safety     Do you feel physically and emotionally safe where you currently live?: Yes     Within the past 12 months, have you been hit, slapped, kicked or otherwise physically hurt by someone?: No     Within the past 12 months, have you been humiliated or emotionally abused in other ways by your partner or ex-partner?: No   Housing Stability: Low Risk  (12/5/2024)    Housing Stability     Do you have housing? : Yes     Are you worried about losing your housing?: No     FAMILY HISTORY: History reviewed. No pertinent family history.      EXAM:Vitals: /83 (BP Location: Left arm, Patient Position: Sitting, Cuff Size: Adult Regular)   Pulse 105   Temp 98.1  F (36.7  C) (Temporal)   Ht 1.829 m (6')   Wt 78.9 kg (173 lb 14.4 oz)   SpO2 96%   BMI 23.59 kg/m    BMI= Body mass index is 23.59 kg/m .    General appearance: Patient is alert and fully cooperative with history & exam.  No sign of distress is noted during the visit.     Psychiatric: Affect is pleasant & appropriate.  Patient appears  motivated to improve health.     Respiratory: Breathing is regular & unlabored while sitting.     HEENT: Hearing is intact to spoken word.  Speech is clear.  No gross evidence of visual impairment that would impact ambulation.     Vascular: DP & PT pulses are not palpable bilateral.  Temperature warm to cool proximal to distal.  CFT is about 5 seconds bilateral lower extremities.  Mild generalized edema bilateral lower extremities but less than 5 mm of pitting in nature.  Subtle varicosities about the leg with some venous staining about the anterior legs.  No wounds about the legs.  No hair growth bilateral.  Minor varicosities are noted.     Neurologic: Lower extremity sensation is intact to light touch.  Protective threshold is mildly diminished bilateral lower extremities tested with a 5.07 monofilament +6/10 applications.  Digits are challenged but the plantar foot remains intact.  Plantar reflexes intact bilateral.    Dermatologic: Skin is intact to both lower extremities with diminished texture, turgor and tone about the integument.  Eschar on left hallux is dry and intact, with eschar lateral left 5th met, 4th left toe.     Musculoskeletal: Patient is ambulatory without assistive device or brace.  Left hallux amputated at IPJ.       Hemoglobin A1C (%)   Date Value   10/16/2024 5.7 (H)   07/09/2024 5.9 (H)     Creatinine (mg/dL)   Date Value   12/27/2024 7.39 (H)   12/06/2024 7.10 (H)   12/05/2024 7.47 (H)   12/03/2024 7.39 (H)   11/30/2024 7.48 (H)   11/29/2024 7.66 (H)     Photo taken 10/7/24:       Imaging:  Following vascular and reconstruction on left 10/24.     ASSESSMENT:       ICD-10-CM    1. Diabetic polyneuropathy associated with type 2 diabetes mellitus (H)  E11.42 Orthotics, Mastectomy and Custom Compression Orders      2. Amputation of left great toe (H)  S98.112A Orthotics, Mastectomy and Custom Compression Orders      3. PAD (peripheral artery disease) (H)  I73.9 Orthotics, Mastectomy and Custom  Compression Orders      4. Dependence on renal dialysis (H)  Z99.2 Orthotics, Mastectomy and Custom Compression Orders          PLAN:  Reviewed patient[facility] in [facility].      10/7/2024   Interpreted VLADIMIR and arterial ultrasound dated July and August 2024  He does have arterial disease but appears to be adequate for healing.    His entire left hallux nail plate is poorly attached with a subungual hematoma.  Any unattached portion of the nail was debrided today with a nail nipper and a sterile dressing was applied.  Recommend Betadine once daily regular bathing but no soaking or submersion.  Once is dry and no longer staining that a regular sock.  Follow-up in 10-14 days.  Begin oral antibiotic Keflex renal based dosing and this was discussed with the pharmacist for renal dosing.    Patient does require peritoneal dialysis at home daily.  Patient understands this may or may not heal due to his arterial insufficiency and renal insufficiency.  Interpreted radiographs today demonstrating no obvious signs of cortical disruption or loss of trabeculation.  No obvious osteomyelitis.  We did however discuss the importance of following this and the risk of developing osteomyelitis as this great toe wound has been a problem for him now for months.  Dispensed a postoperative shoe for the left foot.    1/2/2025  Order for DM shoes  Apply cream daily.   No dressings now  RTC one month       Daniel Siddiqui DPM

## 2025-01-02 NOTE — PROGRESS NOTES
ANTICOAGULATION MANAGEMENT     Arnulfo Pritchett 64 year old male is on warfarin with subtherapeutic INR result. (Goal INR 2.0-3.0)    Recent labs: (last 7 days)     01/02/25  1137   INR 1.9*       ASSESSMENT     Source(s): Chart Review and Home Care/Facility Nurse     Warfarin doses taken: Warfarin taken as instructed. Booster dose given on 12/30/24 which may be effecting INR.  Diet: No new diet changes identified  Medication/supplement changes: None noted  New illness, injury, or hospitalization: No  Signs or symptoms of bleeding or clotting: No  Previous result: Subtherapeutic  Additional findings: None       PLAN     Recommended plan for no diet, medication or health factor changes affecting INR     Dosing Instructions: Increase your warfarin dose (14% change) with next INR in 1 week       Summary  As of 1/2/2025      Full warfarin instructions:  7.5 mg every Sun, Thu; 5 mg all other days   Next INR check:  1/9/2025               Detailed voice message left for Arnulfo with dosing instructions and follow up date.     Orders given to  Homecare nurse/facility to recheck    Education provided: Please call back if any changes to your diet, medications or how you've been taking warfarin  Symptom monitoring: monitoring for bleeding signs and symptoms and monitoring for clotting signs and symptoms    Plan made with [facility] Pharmacist Di Rose RN  1/2/2025  Anticoagulation Clinic  [facility] for routing messages: merlin ECHEVERRIA  [facility] patient phone line: [phone]        _______________________________________________________________________     Anticoagulation Episode Summary       Current INR goal:  2.0-3.0   TTR:  20.3% (4 wk)   Target end date:  Indefinite   Send INR reminders to:  ULI ECHEVERRIA    Indications    Atrial fibrillation (H) [I48.91]  H/O: stroke [Z86.73]  CVA (cerebral vascular accident) (H) [I63.9]  Long term current use of anticoagulant therapy [Z79.01]             Comments:  Home  care back to [place] when discharged             Anticoagulation Care Providers       Provider Role Specialty Phone number    Chai Griffin MD Referring Internal Medicine [phone]

## 2025-01-02 NOTE — PATIENT INSTRUCTIONS
"DIABETES AND YOUR FEET    What effect does diabetes have on the feet?  Diabetes can result in several problems in the feet including contractures of the tendons leading to deformities and reduced function of the bones, skin ulcers or open sores on pressure points or prominent deformities, reduced sensation, reduced blood flow and thus reduced oxygen and immune cells to the tiny vessels in our feet. This all leads to higher risk of hospitalization, infections, and amputations.     What is neuropathy?  Neuropathy is a term used to describe a loss of nerve function.  Patients with diabetes are at risk of developing neuropathy if their sugars continue to run high and are above the normal value of 140.  The elevated blood sugar in the body enters the nerves causing it to swell and impair nerve function.  The higher the blood sugar and the longer it is elevated, the more damage is done to nerves.  This damage is permanent and irreversible.  These damaged sensory nerves can then cause reduced feeling or cause pain.  Damaged motor nerves can reduce blood flow and white blood cells into into your foot, skin and bones reducing your ability to heal a small problem. And neuropathy can cause tendons to become unbalanced and contribute to the formation of deformity and contractures in our feet. Often times, neuropathy can be prevented by controlling your blood sugar.  Your risk of developing neuropathy goes up dramatically as your hemoglobin A1C raises above 7.5.      How do I know if I have neuropathy?  When a person develops neuropathy, they usually begin to feel numbness or tingling in their feet and sometimes in their legs.  Other symptoms may include painful burning or hot feet, tingling, electrical sensations or feeling like insects or ants are crawling on your feet or legs.  If blood sugar remains above 140  for long periods of time, neuropathy can also occur in the hands.  When a person loses their \"protective threshold\" " or ability to detect a [address] [place] monofilament is when they have elevated risk for developing foot deformity, contractures, foot infections, amputations, Charcot arthropathy, or other complications. Keep your hemoglobin A1C below 7.5 to reduce this risk.    What is vascular disease?  Peripheral vascular disease is a term used to describe a loss or decrease in circulation (blood flow).  There is a problem in getting blood, immune cells, and oxygen to areas that need it.  Similar to neuropathy, sugars can build up in the walls of the arteries (blood vessels) and cause them to become swollen, thickened and hardened.  This decreases the amount of blood that can go to an area that needs it.  Though this is common in the legs of diabetic patients, it can also affect other arteries (blood vessels) in the body such as in the heart, kidney, eyes, and the blood flow into bones.  It is often seen first in the small vessels of her body notably our feet and toes.    How do I know if I have vascular disease?  In the legs, vascular disease usually results in cramping.  Patients who develop leg cramps after walking the same distance every time (i.e. One block, half a mile, ect.) need to let their doctors know so that their circulation may be checked.  Cramps causing severe pain in the feet and/or legs while sleeping and the cramps go away when you stand or hang your legs off the side of the bed, may also be a sign of poor blood circulation.  Occasional cramping in cold weather or on rare occasions with activity may not be due to poor circulation, but you should inform your doctor.    How can these problems be prevented?  The key to prevention is good blood sugar control all day every day.  Inadequate blood sugar control is the most common way patients experience these problems. Reducing, controlling and measuring your daily consumption of sugar or carbohydrates is essential to understanding and managing diabetes.   Physical activity (exercise) is a very good way to help decrease your blood sugars.  Exercise can lower your blood sugar, blood pressure, and cholesterol.  It also reduces your risk for heart disease and stroke, relieves stress, and strengthens your heart, muscles and bones. Physical activity also increases your balance and reduces development of contractures and foot deformities over time. In addition, regular activity helps insulin work better, improves your blood circulation, and keeps your joints flexible.  If you're trying to lose weight, a combination of exercise and wise food choices can help you reach your target weight and maintain it.  Activity and exercise alone can not make up for poor diet choices, eating too much, or eating too many sugars or carbohydrates.  Ask your doctor for help when you are not meeting your blood sugar goals. Changes or increases in medication are powerful tools in reducing your blood sugar.    Know your blood sugar and hemoglobin A1C trend.  Upon first diagnosis or during acute illness, checking your blood sugar 4 times a day can help you understand how your diet, activity, and lifestyle affect your blood sugar.  Monitoring your hemoglobin A1C can help you understand how well you are managing blood sugar over the long run.  Your hemoglobin A1C tells you what your blood sugar averages all day, every day, over the past 90 days.       To experience the lowest risk of complications associated with diabetes such as neuropathy, loss of blood flow, bone or joint infection, charcot arthropathy, or amputation, the American Diabetes Association recommends a target hemoglobin A1C of less than 7.0%, while the American Association of Clinical Endocrinologists' recommendation is 6.5% or less.  Both organizations advise that the goals be individualized based on patient factors such as other health conditions, history of hypoglycemia, education, and life expectancy.  A patients risk of  experiencing complications associated with diabetes is only slightly elevated with a hemoglobin A1C above 6.0.  However, this risk goes up exponentially when the hemoglobin A1C is above 7.5.  The longer the hemoglobin A1C is elevated, the more risk that patient will experience in their lifetime. The damage that occurs to nerves, blood vessels, tendons, bones and body organs, while their hemoglobin A1C is elevated is mostly irreversible and worsens with each additional time period of elevated hemoglobin A1C.     You must understand and manage your disease.  Your health insurance or medical team cannot manage this disease for you.  When you take responsibility for understanding and managing your disease, you can expect to experience fewer problems associated with diabetes in your lifetime.  You will  Also experience a higher quality of life and health and reduced cost of health care.              Diabetic Foot Care Recommendations  The following are recommendations for avoiding serious foot problems or injury    DO'S  1. Be aware of your hemoglobin A1C and continue to follow up with your medical team for adjustments in your lifestyle and medication until your reach your A1C goal.  Keep this below 7.5 to reduce your risk of developing complications associated with diabetes.    2.  Wash your feet with lukewarm water and a mild soap and then dry them thoroughly, especially between the toes.  Gently floss your towel or washcloth between each toe at every bath.  Soaking your feet in water cannot clean dead skin, debris, and bacteria from your feet and is not necessary.   3. Examine your feet daily looking for cuts, corns, blisters, cracks, ect..., especially after wearing new shoes or increased or changed activities.  Make sure to look between your toes.  If you cannot see the bottom of your feet, set a mirror on the floor and hold your foot over it, or ask a family member to examine your feet for you daily.  Contact your  doctor immediately if new problems are noted or if sores are not healing.  4.  Immediately apply moisturizer cream such as Cetaphil to the tops and bottoms of your feet, avoiding areas between the toes.  Apply sunscreen or cover your feet if they will be exposed to extended sunlight.  5.Use clean comfortable shoes.  Socks should not have thick seams or cut off the circulation around the leg.  Break in new shoes slowly and rotate with older shoes until broken in.  Check the inside of your shoes with your hand to look for areas of irritation or objects that may have fallen into your shoes.    6. Keep slippers by the side of your bed for use during the night.  7. Shoes should be fitted by a professional and should not cause areas of irritation.  Check your feet regularly when wearing a new pair of shoes and replace them as needed.  8.  Talk to your doctor about proper exercise.  Exercise and stretching stimulate blood flow to your feet and maintain proper glucose levels.  Use it or lose it!  9.  Monitor your blood glucose level and your hemoglobin A1C.  Notify your doctor immediately if your blood sugar is abnormally high or low.  10.  Cut your nails straight across, but then gently round any sharp edges with a nail file.  If you have neuropathy, peripheral vascular disease or cannot see that well to trim your own toenails, see a medical professional for care.    DONT'S  1.  Do not soak your feet if you have an open sore or your provider has informed you that you have neuropathy or loss of protective threshold.  Use only lukewarm water and always check the temperature with your hand as hot water can easily burn your feet.    2.  Never use a hot water bottle or heating pad on your feet.  Also do not apply hot or cold compresses to your feet.  With decreased sensation, you could burn or freeze your feet.  Do not rest your feet near a heat source such as a heater or heat register.    3.  Do not apply any of these to your  "feet:    - over the counter medicine for corns or warts    -  Harsh chemicals like boric acid    -  Do not self-treat corns, cuts, blisters or infections.  Always consult your doctor.   4.  Do not wear sandals, slippers or walk barefoot, especially on harsh surfaces.  5.  If you smoke, stop!!!        Nail Debridement    A high quality instrument makes trimming toenails MUCH easier.  Search [employer] for any 5\" nail nipper manufactured by reliable brands such as Miltex, Integra or Jarit as these quality instruments will help manage difficult nails more effectively and comfortably. We use Miltex -SS.  A physician is not necessary to trim nails even if you are taking blood thinners or are diabetic.  Your family or care givers may help manage your toenails.      Trim or sand the nails once weekly.  Do not wait until they are long and painful or trimming will become too difficult and painful and will increase your risk of complications or infection.  A course file or 120 grit sandpaper on a sanding block can be helpful.  For very thick nails many people prefer battery operated del valle such as an Amope', Personal Pedi and Emjoi for regular use or heavy painful callouses or thick toenails.    Trim or skive any portion of nail that is thick, loose, crumbling, or not well attached. Do not tear the nail away, but rather cut them with a nail nipperor sand or sand them down.  You may follow up with your Podiatric Physician if you have pain, bleeding, infection, questions or other concerns.      You may also contact the following Registered Nurses for further help with nail debridement and minor hygiene concnerns.  They may come to your home or meet them at their clinic to trim your toenails and soak your feet, as well as monitor for any complications that would require evaluation by a Physician.      [facility]  Marija Enamorado RN  Phone & text [phone]    [facility]  Lala Burroughs, " RN  Office [phone]    [employer] [facility]  [phone]  [email or web address] - [facility]    For up to date list and to find foot care nurses in other communities visit American Foot [employer] Nurses Association website:  afcna.org.     Calluses, Corns, IPKs, Porokeratosis    When there is excessive friction or pressure on the skin, the body responds by making the skin thicker.  While this may protect the deeper structures, the thickened skin can take up more space and thus increase pressure over a bony prominence or become an open sore or skin ulcer as this skin becomes less flexible.    Flat, diffuse thickening are simple calluses and they are usually caused by friction.  Often these are the result of rubbing on a shoe or going barefoot.    Calluses with a central core between the toes are called corns.  These often result from prominent joints on adjacent toes rubbing together.  Theses are often a symptom of bone malalignment and will usually recur unless the underlying bones are addressed.    Many of these lesions can be kept comfortable with routine maintenance. This consists of filing them with a Ped Egg, callus file, or 120 grit sandpaper on a block, every day during your bath or shower.  Most people prefer battery operated del valle such as an Amope', Personal Pedi and Emjoi for regular use or heavy painful callouses.  Heavy creams or ointments can be applied 1-2 times every day to keep them soft. Toe spacers can be used for corns, gel pads can be used for other lesions on the bottom of the foot. If there is a deformity noted, such as a prominent bone, often this can be addressed to minimize recurrence. However, sometimes the pressure and lesion simply migrates to another spot after surgery, so it is not a guaranteed cure.     If you have severe callouses and cracking, you may apply heavy ointments that you scoop up such as Cetaphil cream, Eucerin, Aquaphor or Vaseline.  Be sure to obtain cream  or ointment in these brands and not lotion (lotion is water based and not durable enough for feet). For more aggressive help apply heavy creams or ointment under occlusive dressings such as Saran Wrap or Jelly Feet while sleeping.   Jelly Feet can be obtained at [email or web address].     To be successful with managing hyperkeratotic skin, you must manage hygiene daily.  Apply the cream once or twice EVERY day.  At your bath or shower time is the easiest time to work on this when skin is most soft.  There is no medical or surgical treatment that will absolutely eliminate many of these symptoms.      [employer] is a reliable source for all sorts of foot pads, cushions, or interdigital spacers and foot appliances. Go to www.[employer] or request a catalog at 9-673-Lettuce.        Please call with any additional questions.

## 2025-01-02 NOTE — TELEPHONE ENCOUNTER
"[facility]   Recommendations per Kylie: \"If he is feeling SOB and looks \"worn out\" at rest, I think he needs to go into the ED if he continues to feel worse. He is a very complex patient with multiple issues, so I have a low threshold. He has seen by PCP a few days ago and their note from 12/27 states patient had been out of metoprolol for a number of days. Did he start taking this again and at what dose? If he has been taking this maybe we can consider increase it further with additional half tablet in the evening. Are the nurses able to check his O2? If there is new hypoxemia, there could be concern for PE with subtherapeutic INR levels. It looks like PCP had ordered CBC which shows no anemia. His volume has been managed by nephrology. His weight appear stable and no other symptoms of CHF, so not sure if this is contributory. \"      Call to Jennifer to discuss recommendations, any missed dose of metoprolol and O2 sats. No answer, detailed vm left.     Attempt #1:   Call to pt. No answer. Detailed message left with recommendations to go to ER for further evaluation.          Future Appointments   Date Time Provider Department Center   1/6/2025 12:30 PM [facility]   1/6[facility]2025  1:15 [facility]   1/6/2025  2:00 PM Raul Gray MD [facility]   1/7/2025  1:45 PM [facility]   1/10/2025  1:00 PM Jarvis Justice DPM [facility]   1/23/2025 10:40 AM Kylie Boateng PA-C [facility]   2/3/2025  2:15 PM Daniel Siddiqui DPM [facility]   2/7/2025  1:00 PM Jarvis Justice DPM [facility] Christina Gerdowsky, BSN, RN 3:55 PM 01/02/25    "

## 2025-01-02 NOTE — TELEPHONE ENCOUNTER
General Call      Reason for Call:  returning call    What are your questions or concerns:  returning call to INR nurse    Date of last appointment with provider: 12-27-24    [facility] [phone]

## 2025-01-02 NOTE — LETTER
1/2/2025      Arnulfo Pritchett  [address] [facility] [place], [place] [address]      Dear Colleague,    Thank you for referring your patient, Arnulfo Pritchett, to the [facility]. Please see a copy of my visit note below.    Chief Complaint   Patient presents with     Surgical Followup     (9w4d) DOS 10/27/2024 - AMPUTATION, TOE left great toe, Surgeon Haley Joseph DPM     Diabetes     Type 2     HPI:  Arnulfo Pritchett is a 63 year old male who is seen in consultation at the request of Chai Griffin MD    Pt presents for eval of:   (Onset, Location, L/R, Character, Treatments, Injury if yes)     DOS 10/27/2024 - AMPUTATION, TOE left great toe, Surgeon Haley Joseph DPM    Retired.    ROS:  10 point ROS neg other than the symptoms noted above in the HPI.    Patient Active Problem List   Diagnosis     Dependence on renal dialysis (H)     Type 2 diabetes mellitus with left diabetic foot ulcer (H)     Anaphylactic shock, unspecified, initial encounter     Anemia due to chronic kidney disease, on chronic dialysis (H)     Anemia in chronic kidney disease     Atrial fibrillation (H)     Cardiomyopathy (H)     Chronic gout due to renal impairment of multiple sites without tophus     Chronic kidney disease, stage IV (severe) (H)     Coagulation defect (H)     Coronary artery disease involving native coronary artery of native heart with angina pectoris (H)     Coronary atherosclerosis     Dyslipidemia     ED (erectile dysfunction)     ESRD on peritoneal dialysis (H)     Essential (primary) hypertension     Gout     H/O: stroke     Hemoptysis     Hypercoagulable state due to chronic atrial fibrillation (H)     Hyperglycemia, drug-induced     Hyperkalemia     Mixed hypercholesterolemia and hypertriglyceridemia     Hypertensive urgency     Iron deficiency     Iron deficiency anemia, unspecified     Ischemic cardiomyopathy     Ischemic leg     Ischemic stroke (H)     CVA (cerebral vascular accident) (H)     Kidney  transplant candidate     Left homonymous hemianopsia     Nephrotic range proteinuria     TALI on CPAP     Sleep apnea     Other disorders of phosphorus metabolism     Other specified abnormal findings of blood chemistry     Pain, unspecified     Peritoneal dialysis catheter in situ (H)     Personal history of other malignant neoplasm of kidney     Pleural effusion, not elsewhere classified     Renal mass     Right shoulder tendinitis     Secondary hyperparathyroidism of renal origin (H)     ST elevation myocardial infarction involving right coronary artery (H)     Chronic systolic congestive heart failure (H)     Staphylococcus aureus pneumonia (H)     Systolic heart failure (H)     Venous stasis     Diabetes mellitus due to underlying condition, controlled, with chronic kidney disease on chronic dialysis, with long-term current use of insulin (H)     Diabetes mellitus type 2, insulin dependent (H)     Type 2 diabetes mellitus with chronic kidney disease (H)     Type 2 diabetes mellitus (H)     Type 2 DM with hypertension and ESRD on dialysis (H)     Long term current use of anticoagulant therapy     Foot infection     Local skin infection     NSTEMI (non-ST elevated myocardial infarction) (H)     Orthostatic hypotension     Hypovolemia     Moderate protein-calorie malnutrition (H)       PAST MEDICAL HISTORY:   Past Medical History:   Diagnosis Date     CAD (coronary artery disease)      Chronic systolic heart failure (H)      ESRD (end stage renal disease) on dialysis (H)      Gastroesophageal reflux disease without esophagitis      Gout      HLD (hyperlipidemia)      TALI (obstructive sleep apnea)      PAD (peripheral artery disease) (H)     S/p RLE stenting of SFA/popliteal arteries     Type 2 diabetes mellitus with diabetic neuropathy (H)         PAST SURGICAL HISTORY:   Past Surgical History:   Procedure Laterality Date     AMPUTATE TOE(S) Left 10/27/2024    Procedure: AMPUTATION, TOE left great toe;  Surgeon:  aHley Joseph DPM, Podiatry/Foot and Ankle Surgery;  Location: [facility] OR     BYPASS GRAFT FEMOROTIBIAL Left 10/25/2024    Procedure: LEFT FEMORAL ENDARTERECTOMY, LEFT FEMORAL TO TIBIAL PERONEAL TRUNK BYPASS WITH LEFT GREAT SEPHANEOUS VEIN, WOUND VAC PLACEMENT ON LEFT GROIN.;  Surgeon: Raul Gray MD;  Location: [facility] OR     BYPASS GRAFT FEMOROTIBIAL Left 10/27/2024    Procedure: CREATION, BYPASS, VASCULAR, FEMORAL TO TIBIAL REVISION OF BYPASS LEFT COMMON FEMORAL TO TIBIAL.;  Surgeon: Raul Gray MD;  Location: [facility] OR     CV CORONARY ANGIOGRAM N/A 11/27/2024    Procedure: Coronary Angiogram;  Surgeon: Clem Matt MD;  Location:  HEART CARDIAC CATH LAB     CV LOWER EXTREMITY ANGIOGRAM LEFT Left 10/27/2024    Procedure: Angiogram Lower Extremity Left;  Surgeon: Raul Gray MD;  Location:  OR     CV PCI N/A 11/27/2024    Procedure: Percutaneous Coronary Intervention;  Surgeon: Clem Matt MD;  Location:  HEART CARDIAC CATH LAB     IR LOWER EXTREMITY ANGIOGRAM LEFT  10/17/2024        MEDICATIONS:   Current Outpatient Medications:      acetaminophen (TYLENOL) 500 MG tablet, Take 1,000 mg by mouth every 8 hours as needed., Disp: , Rfl:      allopurinol (ZYLOPRIM) 100 MG tablet, Take 200 mg by mouth every evening, Disp: , Rfl:      atorvastatin (LIPITOR) 40 MG tablet, Take 40 mg by mouth at bedtime., Disp: , Rfl:      B Complex-C-Folic Acid (DIALYVITE) TABS, Take 1 tablet by mouth daily., Disp: , Rfl:      calcitRIOL (ROCALTROL) 0.25 MCG capsule, Take 0.25 mcg by mouth at bedtime., Disp: , Rfl:      cinacalcet (SENSIPAR) 60 MG tablet, Take 60 mg by mouth. Take 1 tablet (60 mg) three times a week: Monday, Wednesday, and Friday nights, Disp: , Rfl:      clopidogrel (PLAVIX) 75 MG tablet, Take 1 tablet (75 mg) by mouth every evening., Disp: 90 tablet, Rfl: 3     gentamicin (GARAMYCIN) 0.1 % external cream, Apply topically daily as needed. Apply topically on peritoneal  access site to prevent infections, Disp: , Rfl:      glucose 40 % (400 mg/mL) gel, Take 15-30 g by mouth every 15 minutes as needed for low blood sugar., Disp: , Rfl:      insulin glargine (LANTUS PEN) 100 UNIT/ML pen, Inject 35 Units subcutaneously at bedtime., Disp: , Rfl:      melatonin 5 MG tablet, Take 5 mg by mouth at bedtime, Disp: , Rfl:      metoprolol succinate ER (TOPROL XL) 25 MG 24 hr tablet, Take 1 tablet (25 mg) by mouth daily., Disp: 90 tablet, Rfl: 3     midodrine (PROAMATINE) 2.5 MG tablet, Take 1 tablet (2.5 mg) by mouth once as needed (hypotension/dizziness post PD in AM during the day)., Disp: , Rfl:      omeprazole (PRILOSEC) 20 MG DR capsule, Take 20 mg by mouth daily., Disp: , Rfl:      SEVELAMER CARBONATE PO, Take 800 mg by mouth 3 times daily (with meals), Disp: , Rfl:      warfarin ANTICOAGULANT (COUMADIN) 5 MG tablet, Take 1 tablet (5 mg) by mouth daily., Disp: 90 tablet, Rfl: 1     ALLERGIES:  No Known Allergies     SOCIAL HISTORY:   Social History     Socioeconomic History     Marital status:      Spouse name: Not on file     Number of children: Not on file     Years of education: Not on file     Highest education level: Not on file   Occupational History     Not on file   Tobacco Use     Smoking status: Former     Types: Cigarettes     Smokeless tobacco: Never   Vaping Use     Vaping status: Never Used   Substance and Sexual Activity     Alcohol use: Not Currently     Comment: quit 20 years     Drug use: Never     Sexual activity: Not on file   Other Topics Concern     Not on file   Social History Narrative     Not on file     Social Drivers of Health     Financial Resource Strain: Low Risk  (12/5/2024)    Financial Resource Strain      Within the past 12 months, have you or your family members you live with been unable to get utilities (heat, electricity) when it was really needed?: No   Food Insecurity: Low Risk  (12/5/2024)    Food Insecurity      Within the past 12 months,  did you worry that your food would run out before you got money to buy more?: No      Within the past 12 months, did the food you bought just not last and you didn t have money to get more?: No   Transportation Needs: Low Risk  (12/5/2024)    Transportation Needs      Within the past 12 months, has lack of transportation kept you from medical appointments, getting your medicines, non-medical meetings or appointments, work, or from getting things that you need?: No   Physical Activity: Unknown (10/8/2024)    Exercise Vital Sign      Days of Exercise per Week: 0 days      Minutes of Exercise per Session: Not on file   Stress: No Stress Concern Present (10/8/2024)    [employer] - Occupational Stress Questionnaire      Feeling of Stress : Only a little   Social Connections: Unknown (10/8/2024)    Social Connection and Isolation Panel [NHANES]      Frequency of Communication with Friends and Family: Not on file      Frequency of Social Gatherings with Friends and Family: Patient declined      Attends Sikh Services: Not on file      Active Member of Clubs or Organizations: Not on file      Attends Club or Organization Meetings: Not on file      Marital Status: Not on file   Interpersonal Safety: Low Risk  (11/28/2024)    Interpersonal Safety      Do you feel physically and emotionally safe where you currently live?: Yes      Within the past 12 months, have you been hit, slapped, kicked or otherwise physically hurt by someone?: No      Within the past 12 months, have you been humiliated or emotionally abused in other ways by your partner or ex-partner?: No   Housing Stability: Low Risk  (12/5/2024)    Housing Stability      Do you have housing? : Yes      Are you worried about losing your housing?: No     FAMILY HISTORY: History reviewed. No pertinent family history.      EXAM:Vitals: /83 (BP Location: Left arm, Patient Position: Sitting, Cuff Size: Adult Regular)   Pulse 105    Temp 98.1  F (36.7  C) (Temporal)   Ht 1.829 m (6')   Wt 78.9 kg (173 lb 14.4 oz)   SpO2 96%   BMI 23.59 kg/m    BMI= Body mass index is 23.59 kg/m .    General appearance: Patient is alert and fully cooperative with history & exam.  No sign of distress is noted during the visit.     Psychiatric: Affect is pleasant & appropriate.  Patient appears motivated to improve health.     Respiratory: Breathing is regular & unlabored while sitting.     HEENT: Hearing is intact to spoken word.  Speech is clear.  No gross evidence of visual impairment that would impact ambulation.     Vascular: DP & PT pulses are not palpable bilateral.  Temperature warm to cool proximal to distal.  CFT is about 5 seconds bilateral lower extremities.  Mild generalized edema bilateral lower extremities but less than 5 mm of pitting in nature.  Subtle varicosities about the leg with some venous staining about the anterior legs.  No wounds about the legs.  No hair growth bilateral.  Minor varicosities are noted.     Neurologic: Lower extremity sensation is intact to light touch.  Protective threshold is mildly diminished bilateral lower extremities tested with a 5.07 monofilament +6/10 applications.  Digits are challenged but the plantar foot remains intact.  Plantar reflexes intact bilateral.    Dermatologic: Skin is intact to both lower extremities with diminished texture, turgor and tone about the integument.  Eschar on left hallux is dry and intact, with eschar lateral left 5th met, 4th left toe.     Musculoskeletal: Patient is ambulatory without assistive device or brace.  Left hallux amputated at IPJ.       Hemoglobin A1C (%)   Date Value   10/16/2024 5.7 (H)   07/09/2024 5.9 (H)     Creatinine (mg/dL)   Date Value   12/27/2024 7.39 (H)   12/06/2024 7.10 (H)   12/05/2024 7.47 (H)   12/03/2024 7.39 (H)   11/30/2024 7.48 (H)   11/29/2024 7.66 (H)     Photo taken 10/7/24:       Imaging:  Following vascular and reconstruction on left 10/24.      ASSESSMENT:       ICD-10-CM    1. Diabetic polyneuropathy associated with type 2 diabetes mellitus (H)  E11.42 Orthotics, Mastectomy and Custom Compression Orders      2. Amputation of left great toe (H)  S98.112A Orthotics, Mastectomy and Custom Compression Orders      3. PAD (peripheral artery disease) (H)  I73.9 Orthotics, Mastectomy and Custom Compression Orders      4. Dependence on renal dialysis (H)  Z99.2 Orthotics, Mastectomy and Custom Compression Orders          PLAN:  Reviewed patient[facility] in [facility].      10/7/2024   Interpreted VLADIMIR and arterial ultrasound dated July and August 2024  He does have arterial disease but appears to be adequate for healing.    His entire left hallux nail plate is poorly attached with a subungual hematoma.  Any unattached portion of the nail was debrided today with a nail nipper and a sterile dressing was applied.  Recommend Betadine once daily regular bathing but no soaking or submersion.  Once is dry and no longer staining that a regular sock.  Follow-up in 10-14 days.  Begin oral antibiotic Keflex renal based dosing and this was discussed with the pharmacist for renal dosing.    Patient does require peritoneal dialysis at home daily.  Patient understands this may or may not heal due to his arterial insufficiency and renal insufficiency.  Interpreted radiographs today demonstrating no obvious signs of cortical disruption or loss of trabeculation.  No obvious osteomyelitis.  We did however discuss the importance of following this and the risk of developing osteomyelitis as this great toe wound has been a problem for him now for months.  Dispensed a postoperative shoe for the left foot.    1/2/2025  Order for DM shoes  Apply cream daily.   No dressings now  RTC one month       Daniel Siddiqui DPM          Again, thank you for allowing me to participate in the care of your patient.        Sincerely,        Daniel Siddiqui DPM    Electronically signed

## 2025-01-02 NOTE — TELEPHONE ENCOUNTER
[facility] [place] - [facility]   Cardiology received call from  nurse. See 01/02/2025 telephone encounter.       Future Appointments   Date Time Provider Department Center   1/6/2025 12:30 PM [facility]1 [facility]   1/6/2025  1:15 PM [facility]   1/6/2025  2:00 PM Raul Gray MD [facility]   1/7/2025  1:45 PM SCI1 [facility] CVIMG   1/10/2025  1:00 PM aJrvis Justice DPM [facility]   1/23/2025 10:40 AM Kylie Boateng PA-C SU[facility] PSA CLIN   2/3/2025  2:15 PM Daniel Siddiqui DPM PHPOD [facility]   2/7/2025  1:00 PM [facility]       CINTIA Booker, RN 1:44 PM 01/02/25

## 2025-01-02 NOTE — TELEPHONE ENCOUNTER
"[facility]: Heart Failure Care Coordination   Follow-Up Outreach     Situation/Background:      Chief Complaint   Patient presents with    Call Back     Update on patient        Jennifer, [facility] nurse, called reporting increased SOB.      Current GDMT:  Metoprolol succinate 25 mg once daily      INR goal 2-3 - managed by [facility]    Other pertinent information:   -- 12/5/24: EKG completed during admission  -- 12/12/24: CORE OV with Demike: BP 92/62. HR 59. Wt 173 lbs  We are not able to add back Entresto unfortunately due to his low blood pressure.  He continues to be on as needed midodrine.  Unable to be on Jardiance due to poor creatinine clearance.   No med changes.   FOLLOW UP: We will see him back in late January for follow-up.  -- 12/27/24: PCP OV: No BP, . Wt 173 lbs  We are not able to add back Entresto unfortunately due to his low blood pressure.  He continues to be on as needed midodrine.  Unable to be on Jardiance due to poor creatinine clearance.  -- 1/2/25: Podiatry OV: /83, . Wt 173 lbs  Respiratory: Breathing is regular & unlabored while sitting.   Vascular: DP & PT pulses are not palpable bilateral. Temperature warm to cool proximal to distal. CFT is about 5 seconds bilateral lower extremities. Mild generalized edema bilateral lower extremities but less than 5 mm of pitting in nature. Subtle varicosities about the leg with some venous staining about the anterior legs       Assessment:      Notes: Call returned to Jennifer ([facility] HC nurse). Pt noted to be SOB and reported SOB at rest, \"just sitting in his recliner. He looks worn out. His HR was 113 at rest for over an hr.\" No report of chest pain/pressure/tightness/palpitations, dizziness?LH, swelling, abd bloating.   Lungs \"are relatively clear. The other nurse was here on 12/30 and his HR was 142-144. She called the PCP and med changes were given. His INRs have been low too. 1 - 1.9 since he's been home.\"  Warfarin and INRs managed by " [facility].      Confirmed pt is taking metoprolol 25 mg once daily. He has not needed midodrine     Jennifer asked for a call and then she will update pt.     Recent weights, BPs and HRs:  12/12      173 lbs. BP 92/62, HR 59 (clinic)  12/27      173 lbs. BP No BP,   (clinic)  12/30       173 lbs, No BP, -144 (home)   1/2          169.4 lbs. /68,  (home) 173 lbs (clinic)  /83,       Intervention/Plan:        RN CC routing to provider for review.    Future Appointments   Date Time Provider Department Center   1/6/2025 12:30 PM [facility]   1/6/2025  1[facility]15 PM [facility]   1/6/2025  2:00 PM Raul Gray MD [facility]   1/7/2025  1:45 PM SCIMR1 [facility] CV[facility]G   1/10/2025  1:00 PM Jarvis Justice DPM [facility]   1/23/2025 10:40 AM Kylie Boateng PA-C [facility] PSA CLIN   2/3/2025  2:15 PM Daniel Siddiqui DPM PHPOD [facility]   2/7/2025  1:00 PM Jarvis Justice DPM [facility] Christina Gerdowsky, BSN, RN 1:41 PM 01/02/25

## 2025-01-02 NOTE — TELEPHONE ENCOUNTER
Jennifer, HC RN calling with update on patient, patient in background as well for call.   HR is 113 with vitals this AM. Patient reports sitting for 1 hour prior to vitals being taken.  Patient denies edema, no chest pain  Moderate shortness of breath, no recent illness, feels most short of breath with activity  Was taken off of furosemide, carvedilol and cardiology changed many medications in the last few months after his 2 month admission. Medication list reconciled and UTD per patient confirmation.    This writer informed HC RN I will update PCP as FYI but she should contact cardiology team for recommendations for management as well. Routing to heart team for management and follow up on HR.    Maral Mcintosh, RN, BSN

## 2025-01-03 ENCOUNTER — APPOINTMENT (OUTPATIENT)
Dept: GENERAL RADIOLOGY | Facility: CLINIC | Age: 65
End: 2025-01-03
Attending: STUDENT IN AN ORGANIZED HEALTH CARE EDUCATION/TRAINING PROGRAM
Payer: MEDICARE

## 2025-01-03 ENCOUNTER — HOSPITAL ENCOUNTER (EMERGENCY)
Facility: CLINIC | Age: 65
Discharge: HOME | End: 2025-01-03
Attending: STUDENT IN AN ORGANIZED HEALTH CARE EDUCATION/TRAINING PROGRAM | Admitting: STUDENT IN AN ORGANIZED HEALTH CARE EDUCATION/TRAINING PROGRAM
Payer: MEDICARE

## 2025-01-03 ENCOUNTER — APPOINTMENT (OUTPATIENT)
Dept: CT IMAGING | Facility: CLINIC | Age: 65
End: 2025-01-03
Attending: STUDENT IN AN ORGANIZED HEALTH CARE EDUCATION/TRAINING PROGRAM
Payer: MEDICARE

## 2025-01-03 VITALS
TEMPERATURE: 97.8 F | BODY MASS INDEX: 23.6 KG/M2 | DIASTOLIC BLOOD PRESSURE: 84 MMHG | RESPIRATION RATE: 18 BRPM | OXYGEN SATURATION: 98 % | WEIGHT: 174 LBS | HEART RATE: 85 BPM | SYSTOLIC BLOOD PRESSURE: 121 MMHG

## 2025-01-03 DIAGNOSIS — J18.9 COMMUNITY ACQUIRED PNEUMONIA OF RIGHT MIDDLE LOBE OF LUNG: Primary | ICD-10-CM

## 2025-01-03 DIAGNOSIS — J90 PLEURAL EFFUSION ON RIGHT: ICD-10-CM

## 2025-01-03 DIAGNOSIS — R05.1 ACUTE COUGH: ICD-10-CM

## 2025-01-03 LAB
ALBUMIN SERPL BCG-MCNC: 3 G/DL (ref 3.5–5.2)
ALP SERPL-CCNC: 246 U/L (ref 40–150)
ALT SERPL W P-5'-P-CCNC: 14 U/L (ref 0–70)
ANION GAP SERPL CALCULATED.3IONS-SCNC: 18 MMOL/L (ref 7–15)
AST SERPL W P-5'-P-CCNC: 17 U/L (ref 0–45)
ATRIAL RATE - MUSE: 122 BPM
BASOPHILS # BLD AUTO: 0 10E3/UL (ref 0–0.2)
BASOPHILS NFR BLD AUTO: 0 %
BILIRUB SERPL-MCNC: 0.3 MG/DL
BUN SERPL-MCNC: 54.3 MG/DL (ref 8–23)
CALCIUM SERPL-MCNC: 9.9 MG/DL (ref 8.8–10.4)
CHLORIDE SERPL-SCNC: 93 MMOL/L (ref 98–107)
CREAT SERPL-MCNC: 9.06 MG/DL (ref 0.67–1.17)
DIASTOLIC BLOOD PRESSURE - MUSE: NORMAL MMHG
EGFRCR SERPLBLD CKD-EPI 2021: 6 ML/MIN/1.73M2
EOSINOPHIL # BLD AUTO: 0.2 10E3/UL (ref 0–0.7)
EOSINOPHIL NFR BLD AUTO: 3 %
ERYTHROCYTE [DISTWIDTH] IN BLOOD BY AUTOMATED COUNT: 19.9 % (ref 10–15)
FLUAV RNA SPEC QL NAA+PROBE: NEGATIVE
FLUBV RNA RESP QL NAA+PROBE: NEGATIVE
GLUCOSE SERPL-MCNC: 139 MG/DL (ref 70–99)
HCO3 SERPL-SCNC: 21 MMOL/L (ref 22–29)
HCT VFR BLD AUTO: 34.4 % (ref 40–53)
HGB BLD-MCNC: 11.4 G/DL (ref 13.3–17.7)
IMM GRANULOCYTES # BLD: 0 10E3/UL
IMM GRANULOCYTES NFR BLD: 0 %
INTERPRETATION ECG - MUSE: NORMAL
LYMPHOCYTES # BLD AUTO: 1.4 10E3/UL (ref 0.8–5.3)
LYMPHOCYTES NFR BLD AUTO: 17 %
MAGNESIUM SERPL-MCNC: 1.5 MG/DL (ref 1.7–2.3)
MCH RBC QN AUTO: 32.1 PG (ref 26.5–33)
MCHC RBC AUTO-ENTMCNC: 33.1 G/DL (ref 31.5–36.5)
MCV RBC AUTO: 97 FL (ref 78–100)
MONOCYTES # BLD AUTO: 0.8 10E3/UL (ref 0–1.3)
MONOCYTES NFR BLD AUTO: 9 %
NEUTROPHILS # BLD AUTO: 6 10E3/UL (ref 1.6–8.3)
NEUTROPHILS NFR BLD AUTO: 70 %
NRBC # BLD AUTO: 0 10E3/UL
NRBC BLD AUTO-RTO: 0 /100
NT-PROBNP SERPL-MCNC: ABNORMAL PG/ML (ref 0–900)
P AXIS - MUSE: -23 DEGREES
PLATELET # BLD AUTO: 323 10E3/UL (ref 150–450)
POTASSIUM SERPL-SCNC: 5.2 MMOL/L (ref 3.4–5.3)
PR INTERVAL - MUSE: 160 MS
PROT SERPL-MCNC: 6.2 G/DL (ref 6.4–8.3)
QRS DURATION - MUSE: 92 MS
QT - MUSE: 364 MS
QTC - MUSE: 518 MS
R AXIS - MUSE: -65 DEGREES
RBC # BLD AUTO: 3.55 10E6/UL (ref 4.4–5.9)
RSV RNA SPEC NAA+PROBE: NEGATIVE
SARS-COV-2 RNA RESP QL NAA+PROBE: NEGATIVE
SODIUM SERPL-SCNC: 132 MMOL/L (ref 135–145)
SYSTOLIC BLOOD PRESSURE - MUSE: NORMAL MMHG
T AXIS - MUSE: 69 DEGREES
TROPONIN T SERPL HS-MCNC: 693 NG/L
VENTRICULAR RATE- MUSE: 122 BPM
WBC # BLD AUTO: 8.5 10E3/UL (ref 4–11)

## 2025-01-03 PROCEDURE — 71046 X-RAY EXAM CHEST 2 VIEWS: CPT

## 2025-01-03 PROCEDURE — 93005 ELECTROCARDIOGRAM TRACING: CPT | Performed by: STUDENT IN AN ORGANIZED HEALTH CARE EDUCATION/TRAINING PROGRAM

## 2025-01-03 PROCEDURE — 87637 SARSCOV2&INF A&B&RSV AMP PRB: CPT | Performed by: STUDENT IN AN ORGANIZED HEALTH CARE EDUCATION/TRAINING PROGRAM

## 2025-01-03 PROCEDURE — 36415 COLL VENOUS BLD VENIPUNCTURE: CPT | Performed by: STUDENT IN AN ORGANIZED HEALTH CARE EDUCATION/TRAINING PROGRAM

## 2025-01-03 PROCEDURE — 250N000011 HC RX IP 250 OP 636: Performed by: STUDENT IN AN ORGANIZED HEALTH CARE EDUCATION/TRAINING PROGRAM

## 2025-01-03 PROCEDURE — 94640 AIRWAY INHALATION TREATMENT: CPT

## 2025-01-03 PROCEDURE — 71250 CT THORAX DX C-: CPT

## 2025-01-03 PROCEDURE — 96365 THER/PROPH/DIAG IV INF INIT: CPT | Performed by: STUDENT IN AN ORGANIZED HEALTH CARE EDUCATION/TRAINING PROGRAM

## 2025-01-03 PROCEDURE — 85004 AUTOMATED DIFF WBC COUNT: CPT | Performed by: STUDENT IN AN ORGANIZED HEALTH CARE EDUCATION/TRAINING PROGRAM

## 2025-01-03 PROCEDURE — 99291 CRITICAL CARE FIRST HOUR: CPT | Mod: 25 | Performed by: STUDENT IN AN ORGANIZED HEALTH CARE EDUCATION/TRAINING PROGRAM

## 2025-01-03 PROCEDURE — 83735 ASSAY OF MAGNESIUM: CPT | Performed by: STUDENT IN AN ORGANIZED HEALTH CARE EDUCATION/TRAINING PROGRAM

## 2025-01-03 PROCEDURE — 85014 HEMATOCRIT: CPT | Performed by: STUDENT IN AN ORGANIZED HEALTH CARE EDUCATION/TRAINING PROGRAM

## 2025-01-03 PROCEDURE — 80053 COMPREHEN METABOLIC PANEL: CPT | Performed by: STUDENT IN AN ORGANIZED HEALTH CARE EDUCATION/TRAINING PROGRAM

## 2025-01-03 PROCEDURE — 84484 ASSAY OF TROPONIN QUANT: CPT | Performed by: STUDENT IN AN ORGANIZED HEALTH CARE EDUCATION/TRAINING PROGRAM

## 2025-01-03 PROCEDURE — 250N000013 HC RX MED GY IP 250 OP 250 PS 637: Performed by: STUDENT IN AN ORGANIZED HEALTH CARE EDUCATION/TRAINING PROGRAM

## 2025-01-03 PROCEDURE — 87637 SARSCOV2&INF A&B&RSV AMP PRB: CPT | Performed by: EMERGENCY MEDICINE

## 2025-01-03 PROCEDURE — 250N000009 HC RX 250: Performed by: STUDENT IN AN ORGANIZED HEALTH CARE EDUCATION/TRAINING PROGRAM

## 2025-01-03 PROCEDURE — 96367 TX/PROPH/DG ADDL SEQ IV INF: CPT | Performed by: STUDENT IN AN ORGANIZED HEALTH CARE EDUCATION/TRAINING PROGRAM

## 2025-01-03 PROCEDURE — 99291 CRITICAL CARE FIRST HOUR: CPT | Performed by: STUDENT IN AN ORGANIZED HEALTH CARE EDUCATION/TRAINING PROGRAM

## 2025-01-03 PROCEDURE — 83880 ASSAY OF NATRIURETIC PEPTIDE: CPT | Performed by: STUDENT IN AN ORGANIZED HEALTH CARE EDUCATION/TRAINING PROGRAM

## 2025-01-03 PROCEDURE — 93010 ELECTROCARDIOGRAM REPORT: CPT | Performed by: STUDENT IN AN ORGANIZED HEALTH CARE EDUCATION/TRAINING PROGRAM

## 2025-01-03 RX ORDER — AZITHROMYCIN 250 MG/1
500 TABLET, FILM COATED ORAL ONCE
Status: COMPLETED | OUTPATIENT
Start: 2025-01-03 | End: 2025-01-03

## 2025-01-03 RX ORDER — IPRATROPIUM BROMIDE AND ALBUTEROL SULFATE 2.5; .5 MG/3ML; MG/3ML
3 SOLUTION RESPIRATORY (INHALATION) ONCE
Status: COMPLETED | OUTPATIENT
Start: 2025-01-03 | End: 2025-01-03

## 2025-01-03 RX ORDER — CEFDINIR 300 MG/1
300 CAPSULE ORAL
Qty: 7 CAPSULE | Refills: 0 | Status: SHIPPED | OUTPATIENT
Start: 2025-01-03

## 2025-01-03 RX ORDER — CEFTRIAXONE 2 G/1
2 INJECTION, POWDER, FOR SOLUTION INTRAMUSCULAR; INTRAVENOUS ONCE
Status: COMPLETED | OUTPATIENT
Start: 2025-01-03 | End: 2025-01-03

## 2025-01-03 RX ORDER — IPRATROPIUM BROMIDE AND ALBUTEROL SULFATE 2.5; .5 MG/3ML; MG/3ML
1 SOLUTION RESPIRATORY (INHALATION) EVERY 6 HOURS PRN
Qty: 90 ML | Refills: 0 | Status: SHIPPED | OUTPATIENT
Start: 2025-01-03

## 2025-01-03 RX ORDER — METOPROLOL TARTRATE 25 MG/1
25 TABLET, FILM COATED ORAL ONCE
Status: COMPLETED | OUTPATIENT
Start: 2025-01-03 | End: 2025-01-03

## 2025-01-03 RX ORDER — AZITHROMYCIN 250 MG/1
TABLET, FILM COATED ORAL
Qty: 6 TABLET | Refills: 0 | Status: SHIPPED | OUTPATIENT
Start: 2025-01-03 | End: 2025-01-08

## 2025-01-03 RX ORDER — MAGNESIUM SULFATE 1 G/100ML
1 INJECTION INTRAVENOUS ONCE
Status: COMPLETED | OUTPATIENT
Start: 2025-01-03 | End: 2025-01-03

## 2025-01-03 RX ADMIN — AZITHROMYCIN DIHYDRATE 500 MG: 250 TABLET, FILM COATED ORAL at 21:54

## 2025-01-03 RX ADMIN — IPRATROPIUM BROMIDE AND ALBUTEROL SULFATE 3 ML: .5; 3 SOLUTION RESPIRATORY (INHALATION) at 20:02

## 2025-01-03 RX ADMIN — CEFTRIAXONE SODIUM 2 G: 2 INJECTION, POWDER, FOR SOLUTION INTRAMUSCULAR; INTRAVENOUS at 21:54

## 2025-01-03 RX ADMIN — MAGNESIUM SULFATE IN DEXTROSE 1 G: 10 INJECTION, SOLUTION INTRAVENOUS at 22:32

## 2025-01-03 RX ADMIN — METOPROLOL TARTRATE 25 MG: 25 TABLET, FILM COATED ORAL at 21:11

## 2025-01-03 ASSESSMENT — ENCOUNTER SYMPTOMS
DYSURIA: 0
SORE THROAT: 0
ARTHRALGIAS: 0
VOMITING: 0
MYALGIAS: 0
FATIGUE: 0
NAUSEA: 0
HEMATURIA: 0
FEVER: 0
DIZZINESS: 0
ACTIVITY CHANGE: 0
COUGH: 0
APPETITE CHANGE: 0
NECK STIFFNESS: 0
ABDOMINAL PAIN: 0
RHINORRHEA: 0
CHILLS: 0
SHORTNESS OF BREATH: 1
HEADACHES: 0
EYE REDNESS: 0
DIARRHEA: 0

## 2025-01-03 ASSESSMENT — COLUMBIA-SUICIDE SEVERITY RATING SCALE - C-SSRS
1. IN THE PAST MONTH, HAVE YOU WISHED YOU WERE DEAD OR WISHED YOU COULD GO TO SLEEP AND NOT WAKE UP?: NO
6. HAVE YOU EVER DONE ANYTHING, STARTED TO DO ANYTHING, OR PREPARED TO DO ANYTHING TO END YOUR LIFE?: NO
2. HAVE YOU ACTUALLY HAD ANY THOUGHTS OF KILLING YOURSELF IN THE PAST MONTH?: NO

## 2025-01-03 ASSESSMENT — ACTIVITIES OF DAILY LIVING (ADL)
ADLS_ACUITY_SCORE: 59

## 2025-01-03 NOTE — TELEPHONE ENCOUNTER
[facility] - [facility]   No call back from HC nurse or pt.     Attempt #2:  Call placed to pt with no answer. Message left.   Call to HC nurse. No answer. Message left.       Future Appointments   Date Time Provider Department Center   1/6/2025 12:30 PM [facility]1 [facility]   1/6/2025  1:15 PM [facility]   1/6/2025  2:00 PM Raul Gray MD [facility]   1/7/2025  1:45 PM SCIMR1 [facility] CVIMG   1/10/2025  1:00 PM Jarvis Justice DPM [facility]   1/23/2025 10:40 AM Kylie Boateng PA-C SU[facility] PSA CLIN   2/3/2025  2:15 PM Daniel Siddiqui DPM PHPOD [facility]   2/7/2025  1:00 PM Jarvis Justice DPM [facility] Christina Gerdowsky, BSN, RN 10:48 AM 01/03/25

## 2025-01-04 NOTE — ED PROVIDER NOTES
History     Chief Complaint   Patient presents with    Shortness of Breath     HPI  Arnulfo Pritchett is a 64 year old male who with shortness of breath has been ongoing for the past week.  He just notes he feels like is having a hard time taking deep breaths.  He has not had any fever or specific cough.  He has not had any headaches dizziness or confusion.  Is no rash abdominal pain nausea vomiting diarrhea.  Patient has a complicated medical history including NSTEMI in 11/27/2024 as well as a history of postop hypotension, hyperkalemia, type 2 diabetes, iron deficiency, hypercoagulability due to chronic anticoagulation from atrial fibrillation, history of stroke, stage renal failure on peritoneal dialysis amongst other things.    Recent hospitalization information :  History of coronary artery disease with multiple stents (last intervention in 2021)  Chronic severe HFrEF, ischemic cardiomyopathy, not in acute exacerbation (20-25%):   *History of inferior STEMI in 2017 requiring PCI to the RCA; history of PCI to the OM and LCx in 2021; most recent ischemic evaluation with repeat coronary angiogram during his recent admission which showed severe two-vessel coronary artery disease, proximal RCA 70% stenosed and mid circumflex to distal circumflex 80% stenosed--given lack of angina and other acute events conservative approach was recommended by cardiology; on aspirin, statin, and BB.   *Echo 6/2024 showed LVEF 20-25%, segmental wall motion globally hypokinetic.      Overall stable at this time, no active chest pain continue PTA medication, except discontinue Coreg discontinue torsemide and Entresto.  Will start on low-dose metoprolol XL 25 mg daily.  -  Continue BB, statin and Plavix at this time.     Chronic atrial fibrillation, rate controlled   Chronic anticoagulation use with coumadin: Hx of ablation.   - Continue BB, pharmacy to dose coumadin   .  Good control.     History of severe peripheral artery  disease  Recent admission from 10/15 - 11/9 with Critical limb ischemia of the left lower extremity   S/P left femoral endarterectomy with bovine pericardial patch angioplasty; left distal common femoral artery/proximal superficial femoral artery to tibioperoneal trunk bypass; and temporary closure of left groin wound with wound VAC 10/25/2024.  S/P ligation of side branches of the left great saphenous vein and temporary closure of right groin with application of wound VAC 10/27/2024.  Ischemic left great toe wound S/P amputation of left great toe 10/27/2024.  Left groin wound infection.  Hx of acute RLE arterial occlusion s/p SFA popliteal balloon angioplasty and stenting (5/2024)  - Wound care consulted for L groin and leg incision, toe wound cares   - Continue Plavix and statin as above      ESRD secondary to diabetes mellitus nephropathy on PD  Secondary hyperparathyroidism  Hx RCC s/p nephrectomy   -Nephrology consulted, due to timing of transfer up to the floor, no peritoneal dialysis staff to set patient up for 12/5 evening, but will plan for 12/6 if remains in house. This was discussed with patient      T2DM, insulin dependent, controlled:   Last hemoglobin A1c 5.7  [Lantus 20 U qhs]  - Hold PTA oral agents at this time   - Continue Lantus 20 U qhs   - Medium sliding scale insulin ordered  - BS per protocol   - Monitor for hypoglycemia  Resume PTA medication on discharge.    Allergies:  No Known Allergies    Problem List:    Patient Active Problem List    Diagnosis Date Noted    Moderate protein-calorie malnutrition (H) 12/27/2024     Priority: Medium    Orthostatic hypotension 12/05/2024     Priority: Medium    Hypovolemia 12/05/2024     Priority: Medium    NSTEMI (non-ST elevated myocardial infarction) (H) 11/27/2024     Priority: Medium    Local skin infection 11/07/2024     Priority: Medium    Foot infection 10/15/2024     Priority: Medium    Long term current use of anticoagulant therapy 10/08/2024      Priority: Medium    Sleep apnea 08/23/2024     Priority: Medium     Formatting of this note might be different from the original.   not on cpap      Dependence on renal dialysis (H) 07/10/2024     Priority: Medium    Type 2 diabetes mellitus with left diabetic foot ulcer (H) 07/10/2024     Priority: Medium    Hyperkalemia 05/06/2024     Priority: Medium    Ischemic leg 05/06/2024     Priority: Medium    Right shoulder tendinitis 12/08/2023     Priority: Medium    Iron deficiency anemia, unspecified 06/29/2023     Priority: Medium    Hypercoagulable state due to chronic atrial fibrillation (H) 04/27/2023     Priority: Medium    H/O: stroke 04/10/2023     Priority: Medium    Anaphylactic shock, unspecified, initial encounter 03/31/2023     Priority: Medium    Staphylococcus aureus pneumonia (H) 01/12/2023     Priority: Medium    Hemoptysis 01/01/2023     Priority: Medium    Other disorders of phosphorus metabolism 11/22/2022     Priority: Medium    Ischemic cardiomyopathy 11/07/2022     Priority: Medium    Pleural effusion, not elsewhere classified 10/18/2022     Priority: Medium    Anemia in chronic kidney disease 08/30/2022     Priority: Medium    Personal history of other malignant neoplasm of kidney 08/30/2022     Priority: Medium    Secondary hyperparathyroidism of renal origin (H) 08/30/2022     Priority: Medium    Coagulation defect (H) 07/20/2022     Priority: Medium    Pain, unspecified 07/20/2022     Priority: Medium    Type 2 diabetes mellitus with chronic kidney disease (H) 07/20/2022     Priority: Medium    Iron deficiency 03/31/2022     Priority: Medium    Diabetes mellitus due to underlying condition, controlled, with chronic kidney disease on chronic dialysis, with long-term current use of insulin (H) 12/07/2021     Priority: Medium    Venous stasis 09/27/2021     Priority: Medium    Renal mass 09/02/2021     Priority: Medium    ED (erectile dysfunction) 06/23/2021     Priority: Medium    Peritoneal  dialysis catheter in situ (H) 06/23/2021     Priority: Medium    Atrial fibrillation (H) 05/25/2021     Priority: Medium     Formatting of this note might be different from the original.   7/12/2021 Holter Monitor: 100% afib with rates varying from 66 to 162 beats per minute      Kidney transplant candidate 05/21/2021     Priority: Medium     Formatting of this note might be different from the original.   Awaiting right nx after 7 cm right renal mass found for tx eval      Anemia due to chronic kidney disease, on chronic dialysis (H) 04/23/2021     Priority: Medium    ESRD on peritoneal dialysis (H) 04/23/2021     Priority: Medium    Chronic gout due to renal impairment of multiple sites without tophus 12/09/2019     Priority: Medium    TALI on CPAP 12/09/2019     Priority: Medium    Hyperglycemia, drug-induced 09/27/2019     Priority: Medium    Nephrotic range proteinuria 03/05/2019     Priority: Medium    Cardiomyopathy (H) 02/09/2018     Priority: Medium     Formatting of this note might be different from the original.   8/17/2021 Echo EF 20-25%   7/19/18- per echo: ejection fraction is 50-55%.   12/4/2017- per echo:  ejection fraction is 40-45%.      Essential (primary) hypertension 12/27/2017     Priority: Medium    CVA (cerebral vascular accident) (H) 12/27/2017     Priority: Medium     Formatting of this note might be different from the original.   Formatting of this note might be different from the original.   2015 problems with vision and expressive aphasia resolved rapidly within days.  Formatting of this note might be different from the original.   2015 problems with vision and expressive aphasia resolved rapidly within days.      Diabetes mellitus type 2, insulin dependent (H) 12/27/2017     Priority: Medium    ST elevation myocardial infarction involving right coronary artery (H) 12/12/2017     Priority: Medium    Coronary artery disease involving native coronary artery of native heart with angina  pectoris (H) 12/03/2017     Priority: Medium     Formatting of this note might be different from the original.     05/21/21:  ALESIA- OM1 (2.5 x 12 Synergy postdilated 2.75 balloon); ALESIA from [place] into OM2 (3.0 x 12 Synergy postdialted w/ 3.75 balloon)     12/3/17: ALESIA- RCA (3 x 26 [place])      Coronary atherosclerosis 12/03/2017     Priority: Medium     Formatting of this note might be different from the original.   12/3/17-3 x 26 susan ALESIA RCA      Chronic systolic congestive heart failure (H) 11/27/2017     Priority: Medium    Type 2 DM with hypertension and ESRD on dialysis (H) 03/12/2017     Priority: Medium     Formatting of this note might be different from the original.     Diabetic nephropathy and hypertensive nephrosclerosis clinically     Referred to [facility] for transplant and found to have a right renal mass 3-2021. Also multiple cystic lesions in both kidneys including completx cysts in the left.     Initiated PD 5/3/95899, catheter placed 4/16/2021      Dyslipidemia 01/11/2016     Priority: Medium    Systolic heart failure (H) 07/17/2015     Priority: Medium    Chronic kidney disease, stage IV (severe) (H) 07/15/2015     Priority: Medium    Hypertensive urgency 07/15/2015     Priority: Medium    Ischemic stroke (H) 07/15/2015     Priority: Medium    Left homonymous hemianopsia 07/15/2015     Priority: Medium    Other specified abnormal findings of blood chemistry 07/15/2015     Priority: Medium    Type 2 diabetes mellitus (H) 07/15/2015     Priority: Medium    Gout 06/13/2011     Priority: Medium    Mixed hypercholesterolemia and hypertriglyceridemia 06/29/2009     Priority: Medium        Past Medical History:    Past Medical History:   Diagnosis Date    CAD (coronary artery disease)     Chronic systolic heart failure (H)     ESRD (end stage renal disease) on dialysis (H)     Gastroesophageal reflux disease without esophagitis     Gout     HLD (hyperlipidemia)     TALI (obstructive sleep apnea)     PAD  (peripheral artery disease) (H)     Type 2 diabetes mellitus with diabetic neuropathy (H)        Past Surgical History:    Past Surgical History:   Procedure Laterality Date    AMPUTATE TOE(S) Left 10/27/2024    Procedure: AMPUTATION, TOE left great toe;  Surgeon: Haley Joseph DPM, Podiatry/Foot and Ankle Surgery;  Location: [facility] OR    BYPASS GRAFT FEMOROTIBIAL Left 10/25/2024    Procedure: LEFT FEMORAL ENDARTERECTOMY, LEFT FEMORAL TO TIBIAL PERONEAL TRUNK BYPASS WITH LEFT GREAT SEPHANEOUS VEIN, WOUND VAC PLACEMENT ON LEFT GROIN.;  Surgeon: Raul Gray MD;  Location: [facility] OR    BYPASS GRAFT FEMOROTIBIAL Left 10/27/2024    Procedure: CREATION, BYPASS, VASCULAR, FEMORAL TO TIBIAL REVISION OF BYPASS LEFT COMMON FEMORAL TO TIBIAL.;  Surgeon: Raul Gray MD;  Location: [facility] OR    CV CORONARY ANGIOGRAM N/A 11/27/2024    Procedure: Coronary Angiogram;  Surgeon: Clem Matt MD;  Location:  [facility]    CV LOWER EXTREMITY ANGIOGRAM LEFT Left 10/27/2024    Procedure: Angiogram Lower Extremity Left;  Surgeon: Raul Gray MD;  Location: [facility] OR    CV PCI N/A 11/27/2024    Procedure: Percutaneous Coronary Intervention;  Surgeon: Clem Matt MD;  Location:  [facility]    IR LOWER EXTREMITY ANGIOGRAM LEFT  10/17/2024       Family History:    No family history on file.    Social History:  Marital Status:   [2]  Social History     Tobacco Use    Smoking status: Former     Types: Cigarettes    Smokeless tobacco: Never   Vaping Use    Vaping status: Never Used   Substance Use Topics    Alcohol use: Not Currently     Comment: quit 20 years    Drug use: Never        Medications:    ipratropium - albuterol 0.5 mg/2.5 mg/3 mL (DUONEB) 0.5-2.5 (3) MG/3ML neb solution  acetaminophen (TYLENOL) 500 MG tablet  allopurinol (ZYLOPRIM) 100 MG tablet  atorvastatin (LIPITOR) 40 MG tablet  azithromycin (ZITHROMAX) 250 MG tablet  B Complex-C-Folic Acid (DIALYVITE)  TABS  calcitRIOL (ROCALTROL) 0.25 MCG capsule  cefdinir (OMNICEF) 300 MG capsule  cinacalcet (SENSIPAR) 60 MG tablet  clopidogrel (PLAVIX) 75 MG tablet  gentamicin (GARAMYCIN) 0.1 % external cream  glucose 40 % (400 mg/mL) gel  insulin glargine (LANTUS PEN) 100 UNIT/ML pen  melatonin 5 MG tablet  metoprolol succinate ER (TOPROL XL) 25 MG 24 hr tablet  midodrine (PROAMATINE) 2.5 MG tablet  omeprazole (PRILOSEC) 20 MG DR capsule  SEVELAMER CARBONATE PO  warfarin ANTICOAGULANT (COUMADIN) 5 MG tablet          Review of Systems   Constitutional:  Negative for activity change, appetite change, chills, fatigue and fever.   HENT:  Negative for congestion, rhinorrhea and sore throat.    Eyes:  Negative for redness.   Respiratory:  Positive for shortness of breath. Negative for cough.    Cardiovascular:  Negative for chest pain.   Gastrointestinal:  Negative for abdominal pain, diarrhea, nausea and vomiting.   Genitourinary:  Negative for dysuria and hematuria.   Musculoskeletal:  Negative for arthralgias, myalgias and neck stiffness.   Skin:  Negative for rash.   Neurological:  Negative for dizziness and headaches.   All other systems reviewed and are negative.      Physical Exam   BP: (!) 137/103  Pulse: 58  Temp: 97.8  F (36.6  C)  Resp: 18  Weight: 78.9 kg (174 lb)  SpO2: 97 %      Physical Exam  Vitals and nursing note reviewed.   Constitutional:       General: He is not in acute distress.     Appearance: Normal appearance. He is not toxic-appearing.   HENT:      Head: Atraumatic.   Eyes:      General: No scleral icterus.     Conjunctiva/sclera: Conjunctivae normal.   Cardiovascular:      Rate and Rhythm: Normal rate.      Heart sounds: Normal heart sounds.   Pulmonary:      Effort: Pulmonary effort is normal. No respiratory distress.      Breath sounds: Examination of the right-middle field reveals decreased breath sounds. Examination of the right-lower field reveals decreased breath sounds. Decreased breath sounds  present.   Abdominal:      Palpations: Abdomen is soft.      Tenderness: There is no abdominal tenderness.   Musculoskeletal:         General: No deformity.      Cervical back: Neck supple.      Right lower leg: No edema.      Left lower leg: No edema.   Skin:     General: Skin is warm.      Capillary Refill: Capillary refill takes less than 2 seconds.   Neurological:      General: No focal deficit present.      Mental Status: He is alert and oriented to person, place, and time.         ED Course        Procedures         EKG self interpreted at 2054.  Sinus tachycardia 122 bpm with a intermittent PVC..  Foot 160, QRS 92 and a QTc at 518.  Left axis deviation.  Abnormal EKG.    Critical Care time:  was 35 minutes for this patient excluding procedures.       Results for orders placed or performed during the hospital encounter of 01/03/25 (from the past 24 hours)   Influenza A/B, RSV and SARS-CoV2 PCR (COVID-19) Nasopharyngeal    Specimen: Nasopharyngeal; Swab   Result Value Ref Range    Influenza A PCR Negative Negative    Influenza B PCR Negative Negative    RSV PCR Negative Negative    SARS CoV2 PCR Negative Negative    Narrative    Testing was performed using the Xpert Xpress CoV2/Flu/RSV Assay on the Cepheid GeneXpert Instrument. This test should be ordered for the detection of SARS-CoV2, influenza, and RSV viruses in individuals with signs and symptoms of respiratory tract infection. This test is for in vitro diagnostic use under the [place] FDA for laboratories certified under CLIA to perform high or moderate complexity testing. This test has been [place] FDA cleared. A negative result does not rule out the presence of PCR inhibitors in the specimen or target RNA in concentration below the limit of detection for the assay. If only one viral target is positive but coinfection with multiple targets is suspected, the sample should be re-tested with another FDA cleared, approved, or authorized test, if coninfection would  change clinical management. This test was validated by the [facility]. These laboratories are certified under the Clinical Laboratory Improvement Amendments of 1988 (CLIA-88) as qualified to perfom high complexity laboratory testing.   CBC with platelets differential    Narrative    The following orders were created for panel order CBC with platelets differential.  Procedure                               Abnormality         Status                     ---------                               -----------         ------                     CBC with platelets and d...[[id number]]  Abnormal            Final result                 Please view results for these tests on the individual orders.   Nt probnp inpatient (BNP)   Result Value Ref Range    N terminal Pro BNP Inpatient >70,000 (H) 0 - 900 pg/mL   CBC with platelets and differential   Result Value Ref Range    WBC Count 8.5 4.0 - 11.0 10e3/uL    RBC Count 3.55 (L) 4.40 - 5.90 10e6/uL    Hemoglobin 11.4 (L) 13.3 - 17.7 g/dL    Hematocrit 34.4 (L) 40.0 - 53.0 %    MCV 97 78 - 100 fL    MCH 32.1 26.5 - 33.0 pg    MCHC 33.1 31.5 - 36.5 g/dL    RDW 19.9 (H) 10.0 - 15.0 %    Platelet Count 323 150 - 450 10e3/uL    % Neutrophils 70 %    % Lymphocytes 17 %    % Monocytes 9 %    % Eosinophils 3 %    % Basophils 0 %    % Immature Granulocytes 0 %    NRBCs per 100 WBC 0 <1 /100    Absolute Neutrophils 6.0 1.6 - 8.3 10e3/uL    Absolute Lymphocytes 1.4 0.8 - 5.3 10e3/uL    Absolute Monocytes 0.8 0.0 - 1.3 10e3/uL    Absolute Eosinophils 0.2 0.0 - 0.7 10e3/uL    Absolute Basophils 0.0 0.0 - 0.2 10e3/uL    Absolute Immature Granulocytes 0.0 <=0.4 10e3/uL    Absolute NRBCs 0.0 10e3/uL   XR Chest 2 Views    Narrative    EXAM: XR CHEST 2 VIEWS  LOCATION: [facility]  DATE: 1/3/2025    INDICATION: SOB  COMPARISON: 12/5/2024      Impression    IMPRESSION: New moderate opacity in the right lower lung may represent atelectasis or pneumonia.  Left lung clear. No pneumothorax. Small right pleural effusion suspected. No pneumothorax. Normal heart size. There is a healing fracture of the left seventh   rib posteriorly.   Comprehensive metabolic panel   Result Value Ref Range    Sodium 132 (L) 135 - 145 mmol/L    Potassium 5.2 3.4 - 5.3 mmol/L    Carbon Dioxide (CO2) 21 (L) 22 - 29 mmol/L    Anion Gap 18 (H) 7 - 15 mmol/L    Urea Nitrogen 54.3 (H) 8.0 - 23.0 mg/dL    Creatinine 9.06 (H) 0.67 - 1.17 mg/dL    GFR Estimate 6 (L) >60 mL/min/1.73m2    Calcium 9.9 8.8 - 10.4 mg/dL    Chloride 93 (L) 98 - 107 mmol/L    Glucose 139 (H) 70 - 99 mg/dL    Alkaline Phosphatase 246 (H) 40 - 150 U/L    AST 17 0 - 45 U/L    ALT 14 0 - 70 U/L    Protein Total 6.2 (L) 6.4 - 8.3 g/dL    Albumin 3.0 (L) 3.5 - 5.2 g/dL    Bilirubin Total 0.3 <=1.2 mg/dL   Troponin T, High Sensitivity   Result Value Ref Range    Troponin T, High Sensitivity 693 (HH) <=22 ng/L   Magnesium   Result Value Ref Range    Magnesium 1.5 (L) 1.7 - 2.3 mg/dL   EKG 12-lead, tracing only   Result Value Ref Range    Systolic Blood Pressure  mmHg    Diastolic Blood Pressure  mmHg    Ventricular Rate 122 BPM    Atrial Rate 122 BPM    ND Interval 160 ms    QRS Duration 92 ms     ms    QTc 518 ms    P Axis -23 degrees    R AXIS -65 degrees    T Axis 69 degrees    Interpretation ECG       Sinus tachycardia with Premature ventricular complexes or Fusion complexes  Left anterior fascicular block  Anterior infarct (cited on or before 22-Oct-2024)  Abnormal ECG  When compared with ECG of 05-Dec-2024 10:49,  Fusion complexes are now Present  Questionable change in initial forces of Lateral leads  Nonspecific T wave abnormality no longer evident in Inferior leads  Nonspecific T wave abnormality, worse in Lateral leads  Confirmed by SEE ED PROVIDER NOTE FOR, ECG INTERPRETATION ([id number]),  Calli Rivas ([id number]) on 1/3/2025 9:23:43 PM     Chest CT w/o contrast    Narrative    EXAM: CT CHEST W/O  CONTRAST  LOCATION: [facility]  DATE: 1/3/2025    INDICATION: Shortness of breath.  COMPARISON: Chest x-ray 01/03/2025  TECHNIQUE: CT chest without IV contrast. Multiplanar reformats were obtained. Dose reduction techniques were used.  CONTRAST: None.    FINDINGS:   LUNGS AND PLEURA: Moderate to large sized right pleural effusion with compressive atelectasis involving a significant portion of the right lower lobe. Partial collapse involving the medial aspect of the right middle lobe along the heart border with some   air bronchograms. Small left pleural effusion and left basilar atelectasis. Calcified granuloma left apex.    MEDIASTINUM/AXILLAE: No lymphadenopathy. No thoracic aortic aneurysm. Atherosclerotic disease thoracic aorta.    CORONARY ARTERY CALCIFICATION: Severe.    UPPER ABDOMEN: Ascites right upper quadrant adjacent to the liver has slightly increased. Small amount of ascites left upper quadrant adjacent to the spleen. Atherosclerotic disease abdominal aorta. Plaque at the origins of the celiac trunk and superior   mesenteric artery with minimal narrowing. Plaque and moderate stenosis at the origin of the left renal artery. Plaque and narrowing at the origin of the right renal artery stump. 11 mm exophytic hyperdense nodule upper pole left kidney.    MUSCULOSKELETAL: Hypertrophic changes and degenerative disc disease thoracic spine. Gynecomastia.      Impression    IMPRESSION:   1.  Moderately large sized right pleural effusion with compressive atelectasis involving a significant portion of the right lower lobe. Partial collapse/atelectasis medial aspect of the right middle lobe.  2.  Small left pleural effusion and left basilar atelectasis.  3.  Severe atherosclerotic disease.  4.  Upper abdominal ascites.  5.  11 mm hyperdense nodule upper pole left kidney is indeterminate. Small renal cell carcinoma possible. This could be further assessed with MRI.         Medications    cefTRIAXone (ROCEPHIN) 2 g vial to attach to  ml bag for ADULTS or NS 50 ml bag for PEDS (2 g Intravenous $New Bag 1/3/25 2154)   magnesium sulfate 1 g in 100 mL D5W intermittent infusion (has no administration in time range)   ipratropium - albuterol 0.5 mg/2.5 mg/3 mL (DUONEB) neb solution 3 mL (3 mLs Nebulization $Given 1/3/25 2002)   metoprolol tartrate (LOPRESSOR) tablet 25 mg (25 mg Oral $Given 1/3/25 2111)   azithromycin (ZITHROMAX) tablet 500 mg (500 mg Oral $Given 1/3/25 2154)       Assessments & Plan (with Medical Decision Making)     I have reviewed the nursing notes.    I have reviewed the findings, diagnosis, plan and need for follow up with the patient.    Medical Decision Making    Arnulfo Pritchett is a 64 year old male who with shortness of breath has been ongoing for the past week.  He just notes he feels like is having a hard time taking deep breaths.  He has not had any fever or specific cough.  He has not had any headaches dizziness or confusion.  Is no rash abdominal pain nausea vomiting diarrhea.  Patient has a complicated medical history including NSTEMI in 11/27/2024 as well as a history of postop hypotension, hyperkalemia, type 2 diabetes, iron deficiency, hypercoagulability due to chronic anticoagulation from atrial fibrillation, history of stroke, stage renal failure on peritoneal dialysis amongst other things.    Patient found to have a BNP of greater than 70,000 which is likely consistent secondary patient's history of CHF.  Patient has a poor EF to begin with.  Can be seen on history above.  Patient CMP with noted abnormalities in his sodium of 132, creatinine 9.06 and GFR 6 with a glucose of 139 and a alk phos of 246.  His CBC does not show leukocytosis with a white cell count of 8.5 with a mild anemia of 11.4.  His troponin is mildly elevated at 693.  His COVID flu and RSV are negative.  His chest imaging is concerning for right lower lobe consolidation with possible  effusion.    Patient's had a DuoNeb provided for his shortness of breath and noted that significantly improved his symptoms and would like this on discharge.  Unfortunately patient has remained significantly tachycardic between 115-125.  CT imaging of the chest was ordered to evaluate extent of lung pathology.  Patient found to have a moderate pleural effusion to the left side.  We discussed the signs with patient discussed admission however no bed available here so we will have to transfer to another facility to have consideration of thoracentesis and alterations to his peritoneal dialysis.  Patient notes that he feels better and does not want to be admitted anywhere and wants to go home.  He notes he was recent hospitalized and would like to discharge home at this point and will continue his period of dialysis evening.  He notes he has had these happen in the past and is improved with his peritoneal dialysis.  He notes he will follow-up with his nephrology team early next week and if symptoms worsen or if he is not improving he will return for reevaluation at that time.  We discussed the importance that this could significantly worsen his heart as well as his lung conditions ultimately leading to cardiac or respiratory failure and death.  Patient is understanding of this and notes that he would like to go home at this point with the nebulizer as well as some antibiotics.  Patient was provided a nebulizer prior to discharge along with antibiotics prior to discharge.    New Prescriptions    AZITHROMYCIN (ZITHROMAX) 250 MG TABLET    Take 2 tablets (500 mg) by mouth daily for 1 day, THEN 1 tablet (250 mg) daily for 4 days.    CEFDINIR (OMNICEF) 300 MG CAPSULE    Take 1 capsule (300 mg) by mouth every 48 hours.    IPRATROPIUM - ALBUTEROL 0.5 MG/2.5 MG/3 ML (DUONEB) 0.5-2.5 (3) MG/3ML NEB SOLUTION    Take 1 vial (3 mLs) by nebulization every 6 hours as needed for shortness of breath, wheezing or cough.       Final  diagnoses:   Community acquired pneumonia of right middle lobe of lung   Pleural effusion on right   Acute cough       1/3/2025   [facility] EMERGENCY DEPT       Nico Tan MD  01/03/25 3043

## 2025-01-04 NOTE — DISCHARGE INSTRUCTIONS
Please take the cefdinir and 2 days after your dialysis.  Please take this every 48 hours after dialysis.  The azithromycin can be taken daily until completion.  You are found to have a Moderately large sized right pleural effusion with compressive atelectasis involving a significant portion of the right lower lobe.  As we discussed this could be drained.  And could be secondary to peritoneal cyst versus heart failure versus pneumonia that you are also suffering from.  If your symptoms worsen please do follow-up with the local emergency department as you may require admission.

## 2025-01-06 ENCOUNTER — HOSPITAL ENCOUNTER (OUTPATIENT)
Dept: ULTRASOUND IMAGING | Facility: CLINIC | Age: 65
Discharge: HOME | End: 2025-01-06
Payer: MEDICARE

## 2025-01-06 ENCOUNTER — OFFICE VISIT (OUTPATIENT)
Dept: OTHER | Facility: CLINIC | Age: 65
End: 2025-01-06
Attending: SURGERY
Payer: MEDICARE

## 2025-01-06 ENCOUNTER — ANTICOAGULATION THERAPY VISIT (OUTPATIENT)
Dept: ANTICOAGULATION | Facility: CLINIC | Age: 65
End: 2025-01-06
Payer: COMMERCIAL

## 2025-01-06 ENCOUNTER — DOCUMENTATION ONLY (OUTPATIENT)
Dept: ANTICOAGULATION | Facility: CLINIC | Age: 65
End: 2025-01-06
Payer: COMMERCIAL

## 2025-01-06 ENCOUNTER — TELEPHONE (OUTPATIENT)
Dept: INTERNAL MEDICINE | Facility: CLINIC | Age: 65
End: 2025-01-06
Payer: COMMERCIAL

## 2025-01-06 VITALS — DIASTOLIC BLOOD PRESSURE: 88 MMHG | HEART RATE: 120 BPM | SYSTOLIC BLOOD PRESSURE: 124 MMHG

## 2025-01-06 DIAGNOSIS — Z86.73 H/O: STROKE: ICD-10-CM

## 2025-01-06 DIAGNOSIS — I73.9 PAD (PERIPHERAL ARTERY DISEASE): ICD-10-CM

## 2025-01-06 DIAGNOSIS — I48.91 ATRIAL FIBRILLATION, UNSPECIFIED TYPE (H): Primary | ICD-10-CM

## 2025-01-06 DIAGNOSIS — Z79.01 LONG TERM CURRENT USE OF ANTICOAGULANT THERAPY: ICD-10-CM

## 2025-01-06 DIAGNOSIS — I63.9 CVA (CEREBRAL VASCULAR ACCIDENT) (H): ICD-10-CM

## 2025-01-06 DIAGNOSIS — I48.91 ATRIAL FIBRILLATION (H): Primary | ICD-10-CM

## 2025-01-06 LAB — INR (EXTERNAL): 1.6 (ref 0.9–1.1)

## 2025-01-06 PROCEDURE — 99024 POSTOP FOLLOW-UP VISIT: CPT | Performed by: SURGERY

## 2025-01-06 PROCEDURE — 93926 LOWER EXTREMITY STUDY: CPT | Mod: LT

## 2025-01-06 PROCEDURE — G0463 HOSPITAL OUTPT CLINIC VISIT: HCPCS | Performed by: SURGERY

## 2025-01-06 PROCEDURE — 93922 UPR/L XTREMITY ART 2 LEVELS: CPT

## 2025-01-06 PROCEDURE — 93922 UPR/L XTREMITY ART 2 LEVELS: CPT | Mod: 26 | Performed by: SURGERY

## 2025-01-06 PROCEDURE — 93926 LOWER EXTREMITY STUDY: CPT | Mod: 26 | Performed by: SURGERY

## 2025-01-06 NOTE — PROGRESS NOTES
ANTICOAGULATION  MANAGEMENT: Discharge Review    Arnulfo Pritchett chart reviewed for anticoagulation continuity of care    Emergency room visit on 1/3/25 for community acquired pneumonia.    Discharge disposition: Home **of note, it was recommended that patient be admitted, but patient declined requesting to be discharged home**    Results:    Recent labs: (last 7 days)     12/30/24  1333 01/02/25  1137   INR 1.3* 1.9*     Anticoagulation inpatient management:     not applicable     Anticoagulation discharge instructions:     Warfarin dosing: home regimen continued   Bridging: No   INR goal change: No      Medication changes affecting anticoagulation: Yes: Azithromycin & cefdinir which can increase INR    Additional factors affecting anticoagulation: Yes: BNP >70,000 which may indicate a CHF exacerbation      PLAN     Recommend to check INR on 1/7/25    Spoke with Jennifer    Anticoagulation Calendar updated    Julianne Rose RN  1/6/2025  Anticoagulation Clinic  [facility] for routing messages: merlin ECHEVERRIA  [facility] patient phone line: [phone]

## 2025-01-06 NOTE — TELEPHONE ENCOUNTER
Home Care is calling regarding an established patient with [facility].       Requesting orders from: Chai Griffin  Provider is following patient: Yes  Is this a 60-day recertification request?  No    Orders Requested    HHA (Home Health Aide)  Request for initial certification (first set of orders)   Frequency:  1x/wk for 6 wks       Confirmed ok to leave a detailed message with call back.  Contact information confirmed and updated as needed.  [facility]  [phone], secure line, ok to leave detailed message      Sonia Martinez RN

## 2025-01-06 NOTE — TELEPHONE ENCOUNTER
[facility] Heart [facility]   No return call from pt or HC nurse.     Reviewed chart.     Pt did go to ER on 1/3/25. Admission was recommended but pt declined.     Attempt #3  Call to pt for an update.   No answer. VM left requesting a call back to [facility].      Future Appointments   Date Time Provider Department Center   1/6/2025  1:15 PM SHVUS1 [facility]   1/6/2025  2:00 PM Raul Gray MD [facility]   1/7/2025  1:45 PM SCIMR1 [facility] CVIMG   1/23/2025 10:40 AM Kylie Boateng PA-C [facility] PSA CLIN   2/3/2025  2:15 PM Daniel Siddiqui DPM [facility]   2/7/2025  1:00 PM Jarvis Justice DPM [facility]       CINTIA Booker, RN 12:30 PM 01/06/25

## 2025-01-06 NOTE — PROGRESS NOTES
ANTICOAGULATION MANAGEMENT     Arnulfo Pritchett 64 year old male is on warfarin with subtherapeutic INR result. (Goal INR 2.0-3.0)    Recent labs: (last 7 days)     01/06/25  1012   INR 1.6*       ASSESSMENT     Source(s): Chart Review, Patient/Caregiver Call, and Home Care/Facility Nurse     Warfarin doses taken: Missed dose(s) may be affecting INR, missed Saturday dose.  Diet: No new diet changes identified  Medication/supplement changes:  Azithromycin started on 1/5/2025  New illness, injury, or hospitalization: Yes: ED for pneumonia  Signs or symptoms of bleeding or clotting: No  Previous result: Subtherapeutic  Additional findings: None       PLAN     Recommended plan for temporary change(s) affecting INR     Dosing Instructions: booster dose then continue your current warfarin dose with next INR in 3 days       Summary  As of 1/6/2025      Full warfarin instructions:  1/6: 7.5 mg; Otherwise 7.5 mg every Sun, Thu; 5 mg all other days   Next INR check:  1/9/2025               Telephone call with Yulissa GREGG home care nurse who verbalizes understanding and agrees to plan    Orders given to  Homecare nurse/facility to recheck    Education provided: Interaction IS anticipated between warfarin and Azithromycin    Plan made per [facility] anticoagulation protocol    Abi Oakley RN  1/6/2025  Anticoagulation Clinic  [employer] for routing messages: merlin ECHEVERRIA  [facility] patient phone line: [phone]        _______________________________________________________________________     Anticoagulation Episode Summary       Current INR goal:  2.0-3.0   TTR:  17.8% (1 mo)   Target end date:  Indefinite   Send INR reminders to:  ULI ECHEVERRIA    Indications    Atrial fibrillation (H) [I48.91]  H/O: stroke [Z86.73]  CVA (cerebral vascular accident) (H) [I63.9]  Long term current use of anticoagulant therapy [Z79.01]             Comments:  Home care back to [place] when discharged             Anticoagulation Care  Providers       Provider Role Specialty Phone number    Chai Griffin MD Referring Internal Medicine [phone]

## 2025-01-06 NOTE — PROGRESS NOTES
Updated calendar to reflect that the patient missed Saturday dose so that it carries through on additional encounters.    Abi Oakley RN    [facility] Anticoagulation [facility]

## 2025-01-06 NOTE — PROGRESS NOTES
Mr. Arnulfo Pritchett is a chronically ill yet generally pleasant 64-year-old gentleman who presented to us with left lower extremity critical limb ischemia.  We did a left common femoral endarterectomy and a left common femoral artery to tibioperoneal trunk bypass on 25 October 2024.  Postoperatively he continued to have poor signals and then we took him back to the operating room on 27 October 2024.  Arteriogram showed that there was a large sidebranch of the great saphenous vein that was stealing blood.  This was an in situ bypass.  This was ligated which instantly improved the signals.    Subsequently he went on to heal the surgical site for his left foot.    Presently he has no complaints.    Noninvasive arterial studies showed noncompressible vessels in the left dorsalis pedis and posterior tibial arteries however they have excellent waveforms.  Sonography shows that the bypass graft is patent.    He is on warfarin and clopidogrel.    I explained the findings of the sonography to him in detail.  We would like to see him back in 3 months for ongoing surveillance of the left common femoral artery to tibioperoneal trunk bypass.

## 2025-01-06 NOTE — TELEPHONE ENCOUNTER
Called and left detailed message on confidential voicemail of MARYSOL Duenas with verbal approval for the requested home care orders, per PCP note below.     Hannah Norman BSN, RN

## 2025-01-06 NOTE — PROGRESS NOTES
"ANTICOAGULATION  MANAGEMENT     Interacting Medication Review    Interacting medication(s): Azithromycin (Zithromax, Z-farzaneh) with warfarin.    Duration: 5 days (1/3/25 to 1/8/25)    Indication: Pneumonia    New medication?: Yes, interaction may increase INR and risk of bleeding. Closer INR monitoring recommended.        PLAN     Continue your current warfarin dose with next INR in 1 day            Telephone call with [facility] nurse who verbalizes understanding and agrees to plan    New recheck date updated on anticoagulation calendar     [facility] priority set/moved to \"high\" for new major drug interaction    Plan made per [facility] anticoagulation protocol    Julianne Rose RN  1/6/2025  Anticoagulation Clinic  [employer] for routing messages: merlin ECHEVERRIA  [facility] patient phone line: [phone]  2  "

## 2025-01-07 ENCOUNTER — TELEPHONE (OUTPATIENT)
Dept: CARDIOLOGY | Facility: CLINIC | Age: 65
End: 2025-01-07
Payer: COMMERCIAL

## 2025-01-07 ENCOUNTER — PATIENT OUTREACH (OUTPATIENT)
Dept: CARE COORDINATION | Facility: CLINIC | Age: 65
End: 2025-01-07
Payer: COMMERCIAL

## 2025-01-07 ASSESSMENT — ACTIVITIES OF DAILY LIVING (ADL): DEPENDENT_IADLS:: INDEPENDENT

## 2025-01-07 NOTE — PROGRESS NOTES
Clinic Care Coordination Contact    OUTREACH    Referral Information:  Referral Source: IP Handoff    Chief Complaint   Patient presents with    Clinic Care Coordination - Homecare/TCU     RN CC- TCU discharge        Universal Utilization: 94.2% Admission or ED Risk     Utilization      No Show Count (past year)  6             ED Visits  5             Hospital Admissions  4                    Current as of: 1/7/2025  9:29 AM                Clinical Concerns:  Current Medical Concerns:  RN CC called and spoke to the patient. He was recently discharged to home from the TCU. Patient did have a follow up visit with Primary Care Provider on 12/27/2024 and he has also had a few follow ups with specialists. Patient said home care has been coming out 3 times a week and he said that has been helpful. He also has been going to dialysis. Patient reports he has all medications and doesn't need any refills. Patient can't think of any needs at this time.      Current Behavioral Concerns: None noted.       Education Provided to patient: Advised patient to call clinic for any scheduling needs, questions or concerns. Advised to call the pharmacy with any medication refill needs.       Health Maintenance Reviewed: Due/Overdue   Health Maintenance Due   Topic Date Due    HF ACTION PLAN  Never done    LIPID  Never done    MICROALBUMIN  Never done    PARATHYROID  Never done    DIABETIC FOOT EXAM  Never done    URIC ACID  Never done    URINALYSIS  Never done    HIV SCREENING  Never done    RSV VACCINE (1 - Risk 60-74 years 1-dose series) Never done    COLORECTAL CANCER SCREENING  10/25/2020    EYE EXAM  02/13/2021    HEPATITIS B IMMUNIZATION (5 of 5 - Risk Dialysis Recombivax 3-dose series) 04/05/2023    INFLUENZA VACCINE (1) 09/01/2024    COVID-19 Vaccine (4 - 2024-25 season) 09/01/2024    A1C  01/16/2025       Clinical Pathway: None    Medication Management:  Medication review status: Medications reviewed and no changes reported per  patient.           Functional Status:  Dependent ADLs:: Ambulation-cane  Dependent IADLs:: Independent    Living Situation:       Lifestyle & Psychosocial Needs:    Social Drivers of Health     Food Insecurity: Low Risk  (12/5/2024)    Food Insecurity     Within the past 12 months, did you worry that your food would run out before you got money to buy more?: No     Within the past 12 months, did the food you bought just not last and you didn t have money to get more?: No   Depression: Not at risk (1/6/2025)    PHQ-2     PHQ-2 Score: 0   Housing Stability: Low Risk  (12/5/2024)    Housing Stability     Do you have housing? : Yes     Are you worried about losing your housing?: No   Tobacco Use: Medium Risk (1/6/2025)    Patient History     Smoking Tobacco Use: Former     Smokeless Tobacco Use: Never     Passive Exposure: Not on file   Financial Resource Strain: Low Risk  (12/5/2024)    Financial Resource Strain     Within the past 12 months, have you or your family members you live with been unable to get utilities (heat, electricity) when it was really needed?: No   Alcohol Use: Unknown (11/28/2018)    Received from  and [facility],  and [facility]    AUDIT-C     Frequency of Alcohol Consumption: Monthly or less     Average Number of Drinks: Not on file     Frequency of Binge Drinking: Never   Transportation Needs: Low Risk  (12/5/2024)    Transportation Needs     Within the past 12 months, has lack of transportation kept you from medical appointments, getting your medicines, non-medical meetings or appointments, work, or from getting things that you need?: No   Physical Activity: Unknown (10/8/2024)    Exercise Vital Sign     Days of Exercise per Week: 0 days     Minutes of Exercise per Session: Not on file   Interpersonal Safety: Low Risk  (11/28/2024)    Interpersonal Safety     Do you feel physically and emotionally safe where you currently live?: Yes      Within the past 12 months, have you been hit, slapped, kicked or otherwise physically hurt by someone?: No     Within the past 12 months, have you been humiliated or emotionally abused in other ways by your partner or ex-partner?: No   Stress: No Stress Concern Present (10/8/2024)    [employer] [place] [employer] - Occupational Stress Questionnaire     Feeling of Stress : Only a little   Social Connections: Unknown (10/8/2024)    Social Connection and Isolation Panel [NHANES]     Frequency of Communication with Friends and Family: Not on file     Frequency of Social Gatherings with Friends and Family: Patient declined     Attends Jew Services: Not on file     Active Member of Clubs or Organizations: Not on file     Attends Club or Organization Meetings: Not on file     Marital Status: Not on file   Health Literacy: Not on file        Resources and Interventions:  Current Resources:      Community Resources: Dialysis Services, DME  Supplies Currently Used at Home: Diabetic Supplies, Other  Equipment Currently Used at Home: cane, straight  Employment Status: disabled      Advance Care Plan/Directive  Advanced Care Plans/Directives on file:: No    Referrals Placed: None     Care Plan:      Patient/Caregiver understanding: Patient/caregiver verbalized understanding and denies any additional questions or concerns at this time. RNCC engaged in AIDET communications during encounter.        Future Appointments                Today SCIMR1 [facility], CVIMG    In 2 weeks Kylie Boateng PA-C [facility] Heart [facility] PSA CLIN    In 3 weeks Daniel Siddiqui DPM [facility]    In 1 month Jarvis Justice DPM [facility] Wound Clinic [facility] RUPALI            Plan: Patient declines care coordination at this time. He agrees to reach out in the future if anything comes up.     Viridiana Ziegler RN Care  Coordination   [facility]  Email: [email or web address]  Phone: [phone]

## 2025-01-07 NOTE — TELEPHONE ENCOUNTER
Spoke to pt and let him know that MRI needs to be canceled as they will not get good views without using contrast. Pt told that he would get a call back when another test is recommended. Pt states understanding. Test canceled. Katherin

## 2025-01-07 NOTE — TELEPHONE ENCOUNTER
CEASAR for pt that his MRI today is needing to be canceled d/t his dialysis and asked that pt call back to discuss. Katherin

## 2025-01-08 ENCOUNTER — TELEPHONE (OUTPATIENT)
Dept: DERMATOLOGY | Facility: CLINIC | Age: 65
End: 2025-01-08
Payer: COMMERCIAL

## 2025-01-08 NOTE — TELEPHONE ENCOUNTER
Online Appointment Request      Health Call Center  Phone Message      Reason for Call: Appointment Intake     Patients Requests:    Reason for appointment  Need a few spots checked   Preferred Provider  Marielle Estrada PA-C  Preferred Location  [place]         Action taken: Other: none; Documenting patient was called; detail message was left for patient to call back.

## 2025-01-09 ENCOUNTER — ANTICOAGULATION THERAPY VISIT (OUTPATIENT)
Dept: ANTICOAGULATION | Facility: CLINIC | Age: 65
End: 2025-01-09
Payer: COMMERCIAL

## 2025-01-09 DIAGNOSIS — Z86.73 H/O: STROKE: ICD-10-CM

## 2025-01-09 DIAGNOSIS — Z79.01 LONG TERM CURRENT USE OF ANTICOAGULANT THERAPY: ICD-10-CM

## 2025-01-09 DIAGNOSIS — I48.91 ATRIAL FIBRILLATION, UNSPECIFIED TYPE (H): Primary | ICD-10-CM

## 2025-01-09 DIAGNOSIS — I63.9 CVA (CEREBRAL VASCULAR ACCIDENT) (H): ICD-10-CM

## 2025-01-09 LAB — INR (EXTERNAL): 2.4

## 2025-01-09 NOTE — PROGRESS NOTES
ANTICOAGULATION MANAGEMENT     Arnulfo Pritchett 64 year old male is on warfarin with therapeutic INR result. (Goal INR 2.0-3.0)    Recent labs: (last 7 days)     01/09/25  0000   INR 2.4       ASSESSMENT     Source(s): Chart Review and Home Care/Facility Nurse - Jennifer from [facility]    Warfarin doses taken: Warfarin taken as instructed  Diet: No new diet changes identified  Medication/supplement changes:  completed z-farzaneh.    New illness, injury, or hospitalization: No - was in ED on 1/3/25 for pneumonia  Signs or symptoms of bleeding or clotting: No  Previous result: Subtherapeutic  Additional findings: None       PLAN     Recommended plan for ongoing change(s) affecting INR     Dosing Instructions: Continue your current warfarin dose with next INR in 5 days       Summary  As of 1/9/2025      Full warfarin instructions:  7.5 mg every Sun, Thu; 5 mg all other days   Next INR check:  1/14/2025               Telephone call with Jennifer home care nurse who verbalizes understanding and agrees to plan    Orders given to  Homecare nurse/facility to recheck    Education provided: Please call back if any changes to your diet, medications or how you've been taking warfarin    Plan made per [facility] anticoagulation protocol    Asia Eagle RN  1/9/2025  Anticoagulation Clinic  [employer] for routing messages: merlin ECHEVERRIA  [facility] patient phone line: [phone]        _______________________________________________________________________     Anticoagulation Episode Summary       Current INR goal:  2.0-3.0   TTR:  20.2% (1.1 mo)   Target end date:  Indefinite   Send INR reminders to:  ULI ECHEVERRIA    Indications    Atrial fibrillation (H) [I48.91]  H/O: stroke [Z86.73]  CVA (cerebral vascular accident) (H) [I63.9]  Long term current use of anticoagulant therapy [Z79.01]             Comments:  Home care back to [place] when discharged             Anticoagulation Care Providers       Provider Role Specialty Phone number     Chai Griffin MD [facility] Internal Medicine [phone]

## 2025-01-14 ENCOUNTER — ANTICOAGULATION THERAPY VISIT (OUTPATIENT)
Dept: ANTICOAGULATION | Facility: CLINIC | Age: 65
End: 2025-01-14
Payer: COMMERCIAL

## 2025-01-14 DIAGNOSIS — Z79.01 LONG TERM CURRENT USE OF ANTICOAGULANT THERAPY: ICD-10-CM

## 2025-01-14 DIAGNOSIS — Z86.73 H/O: STROKE: ICD-10-CM

## 2025-01-14 DIAGNOSIS — I48.91 ATRIAL FIBRILLATION, UNSPECIFIED TYPE (H): Primary | ICD-10-CM

## 2025-01-14 DIAGNOSIS — I63.9 CVA (CEREBRAL VASCULAR ACCIDENT) (H): ICD-10-CM

## 2025-01-14 LAB — INR (EXTERNAL): 4.7 (ref 0.9–1.1)

## 2025-01-14 NOTE — PROGRESS NOTES
"ANTICOAGULATION MANAGEMENT     Arnulfo Pritchett 64 year old male is on warfarin with supratherapeutic INR result. (Goal INR 2.0-3.0)    Recent labs: (last 7 days)     01/14/25  1147   INR 4.7*       ASSESSMENT     Source(s): Chart Review and Home Care/Facility Nurse     Warfarin doses taken: Warfarin taken as instructed. Home care is setting up medications, patient confirms he took his medications as they were set up.  Diet: No new diet changes identified. Patient denies changes to diet, and reports that he does not use alcohol, THC, or marijuana.  Medication/supplement changes: Completed cefdinir on 1/11/25  New illness, injury, or hospitalization: Yes: patient is reporting pain in his right lower leg, mostly near is ankle. Patient had a US done on this leg on 1/6/25 and was found to have resting arterial insufficiency. Patient was offered multiple appointments with vascular this week, but has been unable to make them on short notice. Patient states he will call them today to schedule- see mychart encounter from 1/8/25. Home care nurse states the leg is not red, warm, or swollen. Jennifer states the ankle is \"somewhat cold to the touch\". Patient otherwise feels recovered from pneumonia. Patient denies any increase in weight or swelling.    Signs or symptoms of bleeding or clotting: No  Previous result: Therapeutic last visit; previously outside of goal range  Additional findings: None       PLAN     Recommended plan for no diet, medication or health factor changes affecting INR     Dosing Instructions: partial hold then decrease your warfarin dose (13% change) with next INR in 1 week       Summary  As of 1/14/2025      Full warfarin instructions:  1/14: 2.5 mg; Otherwise 5 mg every day   Next INR check:  1/21/2025               Telephone call with Jennifer home care nurse who verbalizes understanding and agrees to plan    Orders given to  Homecare nurse/facility to recheck    Education provided: Please call back if any " changes to your diet, medications or how you've been taking warfarin  Symptom monitoring: monitoring for bleeding signs and symptoms and monitoring for clotting signs and symptoms    Plan made per [facility] anticoagulation protocol    Julianne Rose RN  1/14/2025  Anticoagulation Clinic  [facility] for routing messages: merlin ECHEVERRIA  [facility] patient phone line: [phone]        _______________________________________________________________________     Anticoagulation Episode Summary       Current INR goal:  2.0-3.0   TTR:  21.0% (1.3 mo)   Target end date:  Indefinite   Send INR reminders to:  ULI ECHEVERRIA    Indications    Atrial fibrillation (H) [I48.91]  H/O: stroke [Z86.73]  CVA (cerebral vascular accident) (H) [I63.9]  Long term current use of anticoagulant therapy [Z79.01]             Comments:  Home care back to [place] when discharged             Anticoagulation Care Providers       Provider Role Specialty Phone number    Chai Griffin MD Referring Internal Medicine [phone]

## 2025-01-16 NOTE — PROGRESS NOTES
Chronic and stable.    Plan:   Continue home mesalamine 800 mg BID   [facility]     Renal Progress Note       SHORTHAND KEY FOR MY NOTES:  c = with, s = without, p = after, a = before, x = except, asx = asymptomatic, tx = transplant or treatment, sx = symptoms or symptomatic, cx = canceled or culture, rxn = reaction, yday = yesterday, nl = normal, abx = antibiotics, fxn = function, dx = diagnosis, dz = disease, m/h = melena/hematochezia, c/d/l/ha = cramping/dizziness/lightheadedness/headache, d/c = discharge or diarrhea/constipation, f/c/n/v = fevers/chills/nausea/vomiting, cp/sob = chest pain/shortness of breath, tbv = total body volume, rxn = reaction, tdc = tunneled dialysis catheter, pta = prior to admission, hd = hemodialysis, pd = peritoneal dialysis, hhd = home hemodialysis, edw = estimated dry wt         Assessment/Plan:     1.  ESKD.  Pt's sugars are still quite high, interfering c his ability to properly UF and get good clearance.  He is urinating a decent amt still but isn't on his home dose of diuretics.  A.  Torsemide 100 mg every day.  B.  Needs better diabetic control.  Will add glargine 10 units q AM.  C.  Will use all 2.5% bags again today.  Hopefully, he will UF more.  D.  If he cramps tonight, then give him some saline.  E.  The [facility] is the best TCU for him to go to bc he's on PD.  Others are not generally well-trained in the delivery of PD tx daily.    2.  Severe LLE PAD.  Pt is s/p angio.  His pain is decently controlled.  A.  Pain meds prn.  B.  Further plans per Vasc Surg.    3.  Anemia.  Pt's hb kelley to 7.1 p receiving blood yday.  He may need more blood today.  A.  Follow hb, clinically.  B.  Agree c transfusion.    4.  HTN.  Pt's BP is decently controlled c current regimen.  A.  Continue same meds/doses for now.  B.  Follow BP.    5.  DM2.  Pt's sugars are poorly controlled despite high ISS and adding glargine last night.  A.  Add glargine as above.  B.  Check sugars 4x/d and use high intensity ISS.    6.  FEN.  He doesn't  like the diabetic diet.  A.  Encouraged pt to make better dietary choices to reduce sugar since he's not on a diabetic diet.    Case d/w Dr. España.        Interval History:     Pt is feeling ok.  He doesn't like the diabetic diet.  Still has some pain.  Urinating well.          Medications and Allergies:     Current Facility-Administered Medications   Medication Dose Route Frequency Provider Last Rate Last Admin    allopurinol (ZYLOPRIM) tablet 200 mg  200 mg Oral QPM Haley Joseph DPM, Podiatry/Foot and Ankle Surgery   200 mg at 10/29/24 2026    atorvastatin (LIPITOR) tablet 40 mg  40 mg Oral At Bedtime Haley Joseph DPM, Podiatry/Foot and Ankle Surgery   40 mg at 10/29/24 2137    calcitRIOL (ROCALTROL) capsule 0.25 mcg  0.25 mcg Oral At Bedtime Haley Joseph DPM, Podiatry/Foot and Ankle Surgery   0.25 mcg at 10/29/24 2138    carvedilol (COREG) tablet 6.25 mg  6.25 mg Oral At Bedtime Haley Joseph DPM, Podiatry/Foot and Ankle Surgery   6.25 mg at 10/29/24 2138    cinacalcet (SENSIPAR) tablet 60 mg  60 mg Oral Once per day on Monday Wednesday Friday Haley Joseph DPM, Podiatry/Foot and Ankle Surgery   60 mg at 10/30/24 0805    clopidogrel (PLAVIX) tablet 75 mg  75 mg Oral Daily Chai España MD   75 mg at 10/30/24 0802    insulin aspart (NovoLOG) injection (RAPID ACTING)  1-10 Units Subcutaneous TID AC Chai España MD   7 Units at 10/30/24 0848    insulin aspart (NovoLOG) injection (RAPID ACTING)  1-7 Units Subcutaneous At Bedtime Chai España MD        insulin glargine (LANTUS PEN) injection 10 Units  10 Units Subcutaneous QAM AC Vega Guzman MD        insulin glargine (LANTUS PEN) injection 30 Units  30 Units Subcutaneous At Bedtime Chai España MD        pantoprazole (PROTONIX) EC tablet 40 mg  40 mg Oral BID Haley Joseph DPM, Podiatry/Foot and Ankle Surgery   40 mg at 10/30/24 0802    polyethylene glycol (MIRALAX) Packet 17 g  17 g Oral Daily Haley Joseph, DPMEGHAN,  Podiatry/Foot and Ankle Surgery   17 g at 10/30/24 0802    sacubitril-valsartan (ENTRESTO) 49-51 MG per tablet 1 tablet  1 tablet Oral BID Haley Joseph DPM, Podiatry/Foot and Ankle Surgery   1 tablet at 10/30/24 0805    senna-docusate (SENOKOT-S/PERICOLACE) 8.6-50 MG per tablet 2 tablet  2 tablet Oral BID Sherron Gamble MD   2 tablet at 10/30/24 0801    sevelamer carbonate (RENVELA) tablet 800 mg  800 mg Oral TID w/meals Haley Joseph DPM, Podiatry/Foot and Ankle Surgery   800 mg at 10/30/24 0802    sodium chloride (PF) 0.9% PF flush 3 mL  3 mL Intracatheter Q8H Haley Joseph DPM, Podiatry/Foot and Ankle Surgery   3 mL at 10/30/24 0802    warfarin ANTICOAGULANT (COUMADIN) tablet 6 mg  6 mg Oral ONCE at 18:00 Chai España MD        Warfarin Dose Required Daily - Pharmacist Managed  1 each Oral See Admin Instructions Chai España MD         No Known Allergies       Physical Exam:     Vitals were reviewed     , Blood pressure (!) 133/94, pulse 99, temperature 98.3  F (36.8  C), temperature source Oral, resp. rate 18, weight 79.8 kg (175 lb 14.8 oz), SpO2 97%.  Wt Readings from Last 3 Encounters:   10/30/24 79.8 kg (175 lb 14.8 oz)   10/15/24 78.6 kg (173 lb 4.5 oz)   10/08/24 78.7 kg (173 lb 8 oz)     Intake/Output Summary (Last 24 hours) at 10/30/2024 1110  Last data filed at 10/30/2024 0600  Gross per 24 hour   Intake 600 ml   Output 350 ml   Net 250 ml     GENERAL APPEARANCE: pleasant, NAD, interactive, facial edema  RESP: CTA B c good efforts  CV: RRR c 2/6 m, nl S1/S2   ABDOMEN: s/nt/nd, + PD catheter  EXTREMITIES/SKIN: + edema; LLE dressed         Data:     CBC RESULTS:     Recent Labs   Lab 10/30/24  0721 10/29/24  0914 10/28/24  0827 10/26/24  0645 10/25/24  2224 10/25/24  8891   WBC 10.8 10.8  --  10.4  --   --    RBC 2.11* 1.67*  --  2.17*  --   --    HGB 7.1* 5.9*  --  7.4*  --  8.9*   HCT 20.6* 17.2*  --  21.9*  --   --     310 028 760 250  --      Basic Metabolic  Panel:  Recent Labs   Lab 10/30/24  0826 10/30/24  0721 10/30/24  0617 10/29/24  2112 10/29/24  1703 10/29/24  1221 10/29/24  0900 10/29/24  0643 10/28/24  0835 10/28/24  0827 10/27/24  0615 10/27/24  0231 10/26/24  1431 10/26/24  0645 10/25/24  1148 10/25/24  0608   NA  --  134*  --   --   --   --   --  132*  --  136  --  133*  --  135  --  136   POTASSIUM  --  4.3  --   --   --   --   --  4.1  --  4.5  --  4.6  --  4.4  --  3.9   CHLORIDE  --  93*  --   --   --   --   --  94*  --  96*  --  94*  --  97*  --  99   CO2  --  28  --   --   --   --   --  28  --  28  --  28  --  24  --  27   BUN  --  57.5*  --   --   --   --   --  50.8*  --  49.7*  --  43.1*  --  41.7*  --  40.0*   CR  --  7.60*  --   --   --   --   --  7.85*  --  7.97*  --  8.17*  --  7.86*  --  8.12*   * 301* 340* 199* 174* 227*   < > 310*   < > 255*   < > 361*   < > 340*   < > 163*   TERENCE  --  9.2  --   --   --   --   --  8.6*  --  9.0  --  9.2  --  9.1  --  8.6*    < > = values in this interval not displayed.     INR  Recent Labs   Lab 10/30/24  0721 10/29/24  0914 10/29/24  0643 10/28/24  2009   INR 1.18* 1.13 1.17* 1.20*      Attestation:   I have reviewed today's relevant vital signs, notes, medications, labs and imaging.    Vega Guzman MD  [facility] - Nephrology  [phone]

## 2025-01-18 ENCOUNTER — TELEPHONE (OUTPATIENT)
Dept: NURSING | Facility: CLINIC | Age: 65
End: 2025-01-18
Payer: COMMERCIAL

## 2025-01-18 NOTE — TELEPHONE ENCOUNTER
Nurse Triage SBAR    Is this a 2nd Level Triage? NO    Situation: Pt calling along with daughter. Verbal consent to speak with daughter Manjula.     Background: Refill request.     Assessment: Recently prescribed nebulizer from ED provider for pneumonia. Pt was prescribed Duonebs and would like another prescription for more Duonebs.  He has enough medication for the weekend.     Protocol Recommended Disposition:   No disposition on file.    Recommendation: Call pcp office on Monday morning to request med or see if provider wants him to have a follow up visit. Daughter declined triage for pt.           Does the patient meet one of the following criteria for ADS visit consideration? 16+ years old, with an [facility] PCP     TIP  Providers, please consider if this condition is appropriate for management at one of our Acute and Diagnostic Services sites.     If patient is a good candidate, please use dotphrase <dot>triageresponse and select Refer to ADS to document.

## 2025-01-21 ENCOUNTER — APPOINTMENT (OUTPATIENT)
Dept: GENERAL RADIOLOGY | Facility: CLINIC | Age: 65
DRG: 853 | End: 2025-01-21
Attending: EMERGENCY MEDICINE
Payer: MEDICARE

## 2025-01-21 ENCOUNTER — HOSPITAL ENCOUNTER (EMERGENCY)
Facility: CLINIC | Age: 65
Discharge: OTHER INSTITUTION | DRG: 853 | End: 2025-01-21
Attending: EMERGENCY MEDICINE | Admitting: EMERGENCY MEDICINE
Payer: MEDICARE

## 2025-01-21 ENCOUNTER — TELEPHONE (OUTPATIENT)
Dept: INTERNAL MEDICINE | Facility: CLINIC | Age: 65
End: 2025-01-21
Payer: COMMERCIAL

## 2025-01-21 ENCOUNTER — HOSPITAL ENCOUNTER (INPATIENT)
Facility: CLINIC | Age: 65
End: 2025-01-21
Attending: INTERNAL MEDICINE | Admitting: HOSPITALIST
Payer: MEDICARE

## 2025-01-21 VITALS
BODY MASS INDEX: 22.13 KG/M2 | HEIGHT: 73 IN | OXYGEN SATURATION: 97 % | RESPIRATION RATE: 20 BRPM | HEART RATE: 128 BPM | DIASTOLIC BLOOD PRESSURE: 97 MMHG | SYSTOLIC BLOOD PRESSURE: 125 MMHG | TEMPERATURE: 98.3 F | WEIGHT: 167 LBS

## 2025-01-21 DIAGNOSIS — I12.0 TYPE 2 DM WITH HYPERTENSION AND ESRD ON DIALYSIS (H): ICD-10-CM

## 2025-01-21 DIAGNOSIS — E86.1 HYPOVOLEMIA: ICD-10-CM

## 2025-01-21 DIAGNOSIS — I50.22 CHRONIC SYSTOLIC CONGESTIVE HEART FAILURE (H): ICD-10-CM

## 2025-01-21 DIAGNOSIS — R00.0 TACHYCARDIA, UNSPECIFIED: ICD-10-CM

## 2025-01-21 DIAGNOSIS — I70.238: ICD-10-CM

## 2025-01-21 DIAGNOSIS — R05.8 PRODUCTIVE COUGH: ICD-10-CM

## 2025-01-21 DIAGNOSIS — E11.52 TYPE 2 DIABETES MELLITUS WITH DIABETIC PERIPHERAL ANGIOPATHY WITH GANGRENE, UNSPECIFIED WHETHER LONG TERM INSULIN USE (H): ICD-10-CM

## 2025-01-21 DIAGNOSIS — N18.6 TYPE 2 DM WITH HYPERTENSION AND ESRD ON DIALYSIS (H): ICD-10-CM

## 2025-01-21 DIAGNOSIS — J90 PLEURAL EFFUSION ON RIGHT: ICD-10-CM

## 2025-01-21 DIAGNOSIS — L03.90 CELLULITIS, UNSPECIFIED CELLULITIS SITE: ICD-10-CM

## 2025-01-21 DIAGNOSIS — J96.01 ACUTE RESPIRATORY FAILURE WITH HYPOXIA (H): ICD-10-CM

## 2025-01-21 DIAGNOSIS — N18.6: Primary | ICD-10-CM

## 2025-01-21 DIAGNOSIS — I47.29 NONSUSTAINED VENTRICULAR TACHYCARDIA (H): ICD-10-CM

## 2025-01-21 DIAGNOSIS — L89.899 PRESSURE INJURY OF SKIN OF FOOT, UNSPECIFIED INJURY STAGE, UNSPECIFIED LATERALITY: ICD-10-CM

## 2025-01-21 DIAGNOSIS — R57.9 SHOCK (H): ICD-10-CM

## 2025-01-21 DIAGNOSIS — E44.0 MODERATE PROTEIN-CALORIE MALNUTRITION: ICD-10-CM

## 2025-01-21 DIAGNOSIS — Z79.4: Primary | ICD-10-CM

## 2025-01-21 DIAGNOSIS — Z99.2 TYPE 2 DM WITH HYPERTENSION AND ESRD ON DIALYSIS (H): ICD-10-CM

## 2025-01-21 DIAGNOSIS — Z99.2 PERITONEAL DIALYSIS CATHETER IN PLACE: ICD-10-CM

## 2025-01-21 DIAGNOSIS — I96 GANGRENE OF FOOT (H): ICD-10-CM

## 2025-01-21 DIAGNOSIS — L89.896 PRESSURE INJURY OF DEEP TISSUE OF RIGHT FOOT: ICD-10-CM

## 2025-01-21 DIAGNOSIS — I42.9 CARDIOMYOPATHY, UNSPECIFIED TYPE (H): ICD-10-CM

## 2025-01-21 DIAGNOSIS — N18.4 CHRONIC KIDNEY DISEASE, STAGE IV (SEVERE) (H): ICD-10-CM

## 2025-01-21 DIAGNOSIS — G92.8 TOXIC METABOLIC ENCEPHALOPATHY: ICD-10-CM

## 2025-01-21 DIAGNOSIS — Z79.01 LONG TERM CURRENT USE OF ANTICOAGULANT THERAPY: ICD-10-CM

## 2025-01-21 DIAGNOSIS — I73.9 PAD (PERIPHERAL ARTERY DISEASE): ICD-10-CM

## 2025-01-21 DIAGNOSIS — E08.22: Primary | ICD-10-CM

## 2025-01-21 DIAGNOSIS — E11.22 TYPE 2 DM WITH HYPERTENSION AND ESRD ON DIALYSIS (H): ICD-10-CM

## 2025-01-21 DIAGNOSIS — I48.11 LONGSTANDING PERSISTENT ATRIAL FIBRILLATION (H): ICD-10-CM

## 2025-01-21 DIAGNOSIS — Z99.2: Primary | ICD-10-CM

## 2025-01-21 DIAGNOSIS — L08.9 FOOT INFECTION: Primary | ICD-10-CM

## 2025-01-21 LAB
ALBUMIN SERPL BCG-MCNC: 2.7 G/DL (ref 3.5–5.2)
ALP SERPL-CCNC: 246 U/L (ref 40–150)
ALT SERPL W P-5'-P-CCNC: 14 U/L (ref 0–70)
ANION GAP SERPL CALCULATED.3IONS-SCNC: 14 MMOL/L (ref 7–15)
AST SERPL W P-5'-P-CCNC: 17 U/L (ref 0–45)
ATRIAL RATE - MUSE: NORMAL BPM
BASOPHILS # BLD AUTO: 0 10E3/UL (ref 0–0.2)
BASOPHILS NFR BLD AUTO: 0 %
BILIRUB SERPL-MCNC: 0.2 MG/DL
BUN SERPL-MCNC: 52.4 MG/DL (ref 8–23)
CALCIUM SERPL-MCNC: 10.4 MG/DL (ref 8.8–10.4)
CHLORIDE SERPL-SCNC: 91 MMOL/L (ref 98–107)
CREAT SERPL-MCNC: 7.03 MG/DL (ref 0.67–1.17)
CRP SERPL-MCNC: 191.74 MG/L
DIASTOLIC BLOOD PRESSURE - MUSE: NORMAL MMHG
EGFRCR SERPLBLD CKD-EPI 2021: 8 ML/MIN/1.73M2
EOSINOPHIL # BLD AUTO: 0.2 10E3/UL (ref 0–0.7)
EOSINOPHIL NFR BLD AUTO: 1 %
ERYTHROCYTE [DISTWIDTH] IN BLOOD BY AUTOMATED COUNT: 17.9 % (ref 10–15)
ERYTHROCYTE [SEDIMENTATION RATE] IN BLOOD BY WESTERGREN METHOD: 86 MM/HR (ref 0–20)
FLUAV RNA SPEC QL NAA+PROBE: NEGATIVE
FLUBV RNA RESP QL NAA+PROBE: NEGATIVE
GLUCOSE SERPL-MCNC: 170 MG/DL (ref 70–99)
HCO3 SERPL-SCNC: 25 MMOL/L (ref 22–29)
HCT VFR BLD AUTO: 34.1 % (ref 40–53)
HGB BLD-MCNC: 11.2 G/DL (ref 13.3–17.7)
IMM GRANULOCYTES # BLD: 0.2 10E3/UL
IMM GRANULOCYTES NFR BLD: 1 %
INR PPP: 2.44 (ref 0.85–1.15)
INTERPRETATION ECG - MUSE: NORMAL
LACTATE SERPL-SCNC: 1 MMOL/L (ref 0.7–2)
LYMPHOCYTES # BLD AUTO: 0.9 10E3/UL (ref 0.8–5.3)
LYMPHOCYTES NFR BLD AUTO: 6 %
MCH RBC QN AUTO: 31.5 PG (ref 26.5–33)
MCHC RBC AUTO-ENTMCNC: 32.8 G/DL (ref 31.5–36.5)
MCV RBC AUTO: 96 FL (ref 78–100)
MONOCYTES # BLD AUTO: 0.7 10E3/UL (ref 0–1.3)
MONOCYTES NFR BLD AUTO: 5 %
NEUTROPHILS # BLD AUTO: 11.9 10E3/UL (ref 1.6–8.3)
NEUTROPHILS NFR BLD AUTO: 86 %
NRBC # BLD AUTO: 0 10E3/UL
NRBC BLD AUTO-RTO: 0 /100
P AXIS - MUSE: NORMAL DEGREES
PLATELET # BLD AUTO: 480 10E3/UL (ref 150–450)
POTASSIUM SERPL-SCNC: 4.3 MMOL/L (ref 3.4–5.3)
PR INTERVAL - MUSE: NORMAL MS
PROT SERPL-MCNC: 6.9 G/DL (ref 6.4–8.3)
QRS DURATION - MUSE: 96 MS
QT - MUSE: 418 MS
QTC - MUSE: 607 MS
R AXIS - MUSE: -66 DEGREES
RBC # BLD AUTO: 3.56 10E6/UL (ref 4.4–5.9)
RSV RNA SPEC NAA+PROBE: NEGATIVE
SARS-COV-2 RNA RESP QL NAA+PROBE: NEGATIVE
SODIUM SERPL-SCNC: 130 MMOL/L (ref 135–145)
SYSTOLIC BLOOD PRESSURE - MUSE: NORMAL MMHG
T AXIS - MUSE: 81 DEGREES
VENTRICULAR RATE- MUSE: 127 BPM
WBC # BLD AUTO: 13.8 10E3/UL (ref 4–11)

## 2025-01-21 PROCEDURE — 93010 ELECTROCARDIOGRAM REPORT: CPT | Performed by: EMERGENCY MEDICINE

## 2025-01-21 PROCEDURE — 250N000009 HC RX 250: Performed by: EMERGENCY MEDICINE

## 2025-01-21 PROCEDURE — 36415 COLL VENOUS BLD VENIPUNCTURE: CPT | Performed by: EMERGENCY MEDICINE

## 2025-01-21 PROCEDURE — 96365 THER/PROPH/DIAG IV INF INIT: CPT | Performed by: EMERGENCY MEDICINE

## 2025-01-21 PROCEDURE — 250N000011 HC RX IP 250 OP 636: Performed by: EMERGENCY MEDICINE

## 2025-01-21 PROCEDURE — 85610 PROTHROMBIN TIME: CPT | Performed by: EMERGENCY MEDICINE

## 2025-01-21 PROCEDURE — 71045 X-RAY EXAM CHEST 1 VIEW: CPT

## 2025-01-21 PROCEDURE — 250N000013 HC RX MED GY IP 250 OP 250 PS 637: Performed by: HOSPITALIST

## 2025-01-21 PROCEDURE — 87581 M.PNEUMON DNA AMP PROBE: CPT | Performed by: HOSPITALIST

## 2025-01-21 PROCEDURE — 93005 ELECTROCARDIOGRAM TRACING: CPT | Performed by: EMERGENCY MEDICINE

## 2025-01-21 PROCEDURE — 99285 EMERGENCY DEPT VISIT HI MDM: CPT | Performed by: EMERGENCY MEDICINE

## 2025-01-21 PROCEDURE — 87641 MR-STAPH DNA AMP PROBE: CPT | Performed by: HOSPITALIST

## 2025-01-21 PROCEDURE — 84075 ASSAY ALKALINE PHOSPHATASE: CPT | Performed by: EMERGENCY MEDICINE

## 2025-01-21 PROCEDURE — 83605 ASSAY OF LACTIC ACID: CPT | Performed by: EMERGENCY MEDICINE

## 2025-01-21 PROCEDURE — 85652 RBC SED RATE AUTOMATED: CPT | Performed by: EMERGENCY MEDICINE

## 2025-01-21 PROCEDURE — 96361 HYDRATE IV INFUSION ADD-ON: CPT | Performed by: EMERGENCY MEDICINE

## 2025-01-21 PROCEDURE — 250N000013 HC RX MED GY IP 250 OP 250 PS 637

## 2025-01-21 PROCEDURE — 87640 STAPH A DNA AMP PROBE: CPT | Performed by: HOSPITALIST

## 2025-01-21 PROCEDURE — 87486 CHLMYD PNEUM DNA AMP PROBE: CPT | Performed by: HOSPITALIST

## 2025-01-21 PROCEDURE — 87077 CULTURE AEROBIC IDENTIFY: CPT | Performed by: HOSPITALIST

## 2025-01-21 PROCEDURE — 258N000003 HC RX IP 258 OP 636: Performed by: EMERGENCY MEDICINE

## 2025-01-21 PROCEDURE — 87637 SARSCOV2&INF A&B&RSV AMP PRB: CPT | Performed by: EMERGENCY MEDICINE

## 2025-01-21 PROCEDURE — 94640 AIRWAY INHALATION TREATMENT: CPT

## 2025-01-21 PROCEDURE — 85004 AUTOMATED DIFF WBC COUNT: CPT | Performed by: EMERGENCY MEDICINE

## 2025-01-21 PROCEDURE — 250N000013 HC RX MED GY IP 250 OP 250 PS 637: Performed by: EMERGENCY MEDICINE

## 2025-01-21 PROCEDURE — 82310 ASSAY OF CALCIUM: CPT | Performed by: EMERGENCY MEDICINE

## 2025-01-21 PROCEDURE — 99223 1ST HOSP IP/OBS HIGH 75: CPT | Mod: AI | Performed by: HOSPITALIST

## 2025-01-21 PROCEDURE — 84155 ASSAY OF PROTEIN SERUM: CPT | Performed by: EMERGENCY MEDICINE

## 2025-01-21 PROCEDURE — 99285 EMERGENCY DEPT VISIT HI MDM: CPT | Mod: 25 | Performed by: EMERGENCY MEDICINE

## 2025-01-21 PROCEDURE — 120N000001 HC R&B MED SURG/OB

## 2025-01-21 PROCEDURE — 87186 SC STD MICRODIL/AGAR DIL: CPT | Performed by: HOSPITALIST

## 2025-01-21 PROCEDURE — 86140 C-REACTIVE PROTEIN: CPT | Performed by: EMERGENCY MEDICINE

## 2025-01-21 RX ORDER — AMOXICILLIN 250 MG
2 CAPSULE ORAL 2 TIMES DAILY PRN
Status: DISCONTINUED | OUTPATIENT
Start: 2025-01-21 | End: 2025-01-27

## 2025-01-21 RX ORDER — CALCITRIOL 0.25 UG/1
0.25 CAPSULE, LIQUID FILLED ORAL AT BEDTIME
Status: DISCONTINUED | OUTPATIENT
Start: 2025-01-21 | End: 2025-01-28

## 2025-01-21 RX ORDER — HYDROMORPHONE HCL IN WATER/PF 6 MG/30 ML
0.2 PATIENT CONTROLLED ANALGESIA SYRINGE INTRAVENOUS
Status: DISCONTINUED | OUTPATIENT
Start: 2025-01-21 | End: 2025-01-29

## 2025-01-21 RX ORDER — OMEPRAZOLE 20 MG/1
20 CAPSULE, DELAYED RELEASE ORAL DAILY
Status: DISCONTINUED | OUTPATIENT
Start: 2025-01-22 | End: 2025-01-21

## 2025-01-21 RX ORDER — NICOTINE POLACRILEX 4 MG
15-30 LOZENGE BUCCAL
Status: DISCONTINUED | OUTPATIENT
Start: 2025-01-21 | End: 2025-01-31

## 2025-01-21 RX ORDER — CALCIUM CARBONATE 500 MG/1
1000 TABLET, CHEWABLE ORAL 4 TIMES DAILY PRN
Status: DISCONTINUED | OUTPATIENT
Start: 2025-01-21 | End: 2025-01-21

## 2025-01-21 RX ORDER — ONDANSETRON 2 MG/ML
4 INJECTION INTRAMUSCULAR; INTRAVENOUS EVERY 6 HOURS PRN
Status: DISCONTINUED | OUTPATIENT
Start: 2025-01-21 | End: 2025-03-01 | Stop reason: HOSPADM

## 2025-01-21 RX ORDER — GENTAMICIN SULFATE 1 MG/G
CREAM TOPICAL DAILY PRN
Status: DISCONTINUED | OUTPATIENT
Start: 2025-01-22 | End: 2025-01-27

## 2025-01-21 RX ORDER — OXYCODONE HYDROCHLORIDE 5 MG/1
5 TABLET ORAL EVERY 4 HOURS PRN
Status: DISCONTINUED | OUTPATIENT
Start: 2025-01-21 | End: 2025-01-29

## 2025-01-21 RX ORDER — SEVELAMER CARBONATE 800 MG/1
800 TABLET, FILM COATED ORAL
Status: DISCONTINUED | OUTPATIENT
Start: 2025-01-22 | End: 2025-01-28

## 2025-01-21 RX ORDER — NALOXONE HYDROCHLORIDE 0.4 MG/ML
0.2 INJECTION, SOLUTION INTRAMUSCULAR; INTRAVENOUS; SUBCUTANEOUS
Status: DISCONTINUED | OUTPATIENT
Start: 2025-01-21 | End: 2025-03-01 | Stop reason: HOSPADM

## 2025-01-21 RX ORDER — METOPROLOL SUCCINATE 25 MG/1
25 TABLET, EXTENDED RELEASE ORAL DAILY
Status: DISCONTINUED | OUTPATIENT
Start: 2025-01-22 | End: 2025-02-01

## 2025-01-21 RX ORDER — AMOXICILLIN 250 MG
1 CAPSULE ORAL 2 TIMES DAILY PRN
Status: DISCONTINUED | OUTPATIENT
Start: 2025-01-21 | End: 2025-01-21

## 2025-01-21 RX ORDER — HYDROMORPHONE HYDROCHLORIDE 2 MG/1
2 TABLET ORAL ONCE
Status: COMPLETED | OUTPATIENT
Start: 2025-01-21 | End: 2025-01-21

## 2025-01-21 RX ORDER — PIPERACILLIN SODIUM, TAZOBACTAM SODIUM 2; .25 G/10ML; G/10ML
2.25 INJECTION, POWDER, LYOPHILIZED, FOR SOLUTION INTRAVENOUS ONCE
Status: COMPLETED | OUTPATIENT
Start: 2025-01-21 | End: 2025-01-21

## 2025-01-21 RX ORDER — ALLOPURINOL 100 MG/1
200 TABLET ORAL EVERY EVENING
Status: DISCONTINUED | OUTPATIENT
Start: 2025-01-21 | End: 2025-03-01 | Stop reason: HOSPADM

## 2025-01-21 RX ORDER — METOPROLOL SUCCINATE 25 MG/1
25 TABLET, EXTENDED RELEASE ORAL ONCE
Status: COMPLETED | OUTPATIENT
Start: 2025-01-21 | End: 2025-01-21

## 2025-01-21 RX ORDER — WARFARIN SODIUM 5 MG/1
7.5 TABLET ORAL
COMMUNITY

## 2025-01-21 RX ORDER — CINACALCET 30 MG/1
60 TABLET, FILM COATED ORAL
Status: DISCONTINUED | OUTPATIENT
Start: 2025-01-22 | End: 2025-03-01 | Stop reason: HOSPADM

## 2025-01-21 RX ORDER — ACETAMINOPHEN 325 MG/1
650 TABLET ORAL EVERY 4 HOURS PRN
Status: DISCONTINUED | OUTPATIENT
Start: 2025-01-21 | End: 2025-01-27

## 2025-01-21 RX ORDER — NALOXONE HYDROCHLORIDE 0.4 MG/ML
0.4 INJECTION, SOLUTION INTRAMUSCULAR; INTRAVENOUS; SUBCUTANEOUS
Status: DISCONTINUED | OUTPATIENT
Start: 2025-01-21 | End: 2025-03-01 | Stop reason: HOSPADM

## 2025-01-21 RX ORDER — ACETAMINOPHEN 650 MG/1
650 SUPPOSITORY RECTAL EVERY 4 HOURS PRN
Status: DISCONTINUED | OUTPATIENT
Start: 2025-01-21 | End: 2025-03-01 | Stop reason: HOSPADM

## 2025-01-21 RX ORDER — CALCIUM CARBONATE 500 MG/1
1000 TABLET, CHEWABLE ORAL 4 TIMES DAILY PRN
Status: DISCONTINUED | OUTPATIENT
Start: 2025-01-21 | End: 2025-03-01 | Stop reason: HOSPADM

## 2025-01-21 RX ORDER — ONDANSETRON 4 MG/1
4 TABLET, ORALLY DISINTEGRATING ORAL EVERY 6 HOURS PRN
Status: DISCONTINUED | OUTPATIENT
Start: 2025-01-21 | End: 2025-03-01 | Stop reason: HOSPADM

## 2025-01-21 RX ORDER — DEXTROSE MONOHYDRATE 25 G/50ML
25-50 INJECTION, SOLUTION INTRAVENOUS
Status: DISCONTINUED | OUTPATIENT
Start: 2025-01-21 | End: 2025-01-31

## 2025-01-21 RX ORDER — ATORVASTATIN CALCIUM 40 MG/1
40 TABLET, FILM COATED ORAL AT BEDTIME
Status: DISCONTINUED | OUTPATIENT
Start: 2025-01-21 | End: 2025-01-28

## 2025-01-21 RX ORDER — B COMPLEX, C NO.20/FOLIC ACID 1 MG
1 CAPSULE ORAL DAILY
Status: DISCONTINUED | OUTPATIENT
Start: 2025-01-22 | End: 2025-03-01 | Stop reason: HOSPADM

## 2025-01-21 RX ORDER — LIDOCAINE 40 MG/G
CREAM TOPICAL
Status: DISCONTINUED | OUTPATIENT
Start: 2025-01-21 | End: 2025-02-01

## 2025-01-21 RX ORDER — CLOPIDOGREL BISULFATE 75 MG/1
75 TABLET ORAL EVERY EVENING
Status: DISCONTINUED | OUTPATIENT
Start: 2025-01-21 | End: 2025-01-28

## 2025-01-21 RX ORDER — PANTOPRAZOLE SODIUM 40 MG/1
40 TABLET, DELAYED RELEASE ORAL
Status: DISCONTINUED | OUTPATIENT
Start: 2025-01-22 | End: 2025-01-27

## 2025-01-21 RX ORDER — HYDROMORPHONE HCL IN WATER/PF 6 MG/30 ML
0.4 PATIENT CONTROLLED ANALGESIA SYRINGE INTRAVENOUS
Status: DISCONTINUED | OUTPATIENT
Start: 2025-01-21 | End: 2025-01-29

## 2025-01-21 RX ORDER — AMOXICILLIN 250 MG
2 CAPSULE ORAL 2 TIMES DAILY PRN
Status: DISCONTINUED | OUTPATIENT
Start: 2025-01-21 | End: 2025-01-21

## 2025-01-21 RX ORDER — ACETAMINOPHEN 500 MG
1000 TABLET ORAL ONCE
Status: COMPLETED | OUTPATIENT
Start: 2025-01-21 | End: 2025-01-21

## 2025-01-21 RX ORDER — ACYCLOVIR 400 MG/1
TABLET ORAL
Qty: 9 EACH | Refills: 1 | Status: ON HOLD | OUTPATIENT
Start: 2025-01-21

## 2025-01-21 RX ORDER — IPRATROPIUM BROMIDE AND ALBUTEROL SULFATE 2.5; .5 MG/3ML; MG/3ML
3 SOLUTION RESPIRATORY (INHALATION) ONCE
Status: COMPLETED | OUTPATIENT
Start: 2025-01-21 | End: 2025-01-21

## 2025-01-21 RX ORDER — PIPERACILLIN SODIUM, TAZOBACTAM SODIUM 2; .25 G/10ML; G/10ML
2.25 INJECTION, POWDER, LYOPHILIZED, FOR SOLUTION INTRAVENOUS EVERY 8 HOURS
Status: DISCONTINUED | OUTPATIENT
Start: 2025-01-22 | End: 2025-01-28

## 2025-01-21 RX ORDER — WARFARIN SODIUM 5 MG/1
5 TABLET ORAL ONCE
Status: COMPLETED | OUTPATIENT
Start: 2025-01-21 | End: 2025-01-21

## 2025-01-21 RX ORDER — AMOXICILLIN 250 MG
1 CAPSULE ORAL 2 TIMES DAILY PRN
Status: DISCONTINUED | OUTPATIENT
Start: 2025-01-21 | End: 2025-01-27

## 2025-01-21 RX ORDER — FOLIC ACID/VIT B COMPLEX AND C 0.8 MG
1 TABLET ORAL DAILY
Status: DISCONTINUED | OUTPATIENT
Start: 2025-01-22 | End: 2025-01-21

## 2025-01-21 RX ADMIN — PIPERACILLIN AND TAZOBACTAM 2.25 G: 2; .25 INJECTION, POWDER, FOR SOLUTION INTRAVENOUS at 17:24

## 2025-01-21 RX ADMIN — WARFARIN SODIUM 5 MG: 5 TABLET ORAL at 23:43

## 2025-01-21 RX ADMIN — HYDROMORPHONE HYDROCHLORIDE 2 MG: 2 TABLET ORAL at 19:57

## 2025-01-21 RX ADMIN — METOPROLOL SUCCINATE 25 MG: 25 TABLET, EXTENDED RELEASE ORAL at 19:57

## 2025-01-21 RX ADMIN — CLOPIDOGREL BISULFATE 75 MG: 75 TABLET ORAL at 23:43

## 2025-01-21 RX ADMIN — ALLOPURINOL 200 MG: 100 TABLET ORAL at 23:43

## 2025-01-21 RX ADMIN — ACETAMINOPHEN 1000 MG: 500 TABLET, FILM COATED ORAL at 19:57

## 2025-01-21 RX ADMIN — ATORVASTATIN CALCIUM 40 MG: 40 TABLET, FILM COATED ORAL at 23:43

## 2025-01-21 RX ADMIN — SODIUM CHLORIDE 500 ML: 9 INJECTION, SOLUTION INTRAVENOUS at 17:24

## 2025-01-21 RX ADMIN — IPRATROPIUM BROMIDE AND ALBUTEROL SULFATE 3 ML: .5; 3 SOLUTION RESPIRATORY (INHALATION) at 16:26

## 2025-01-21 ASSESSMENT — ACTIVITIES OF DAILY LIVING (ADL)
ADLS_ACUITY_SCORE: 59
ADLS_ACUITY_SCORE: 56
ADLS_ACUITY_SCORE: 59
ADLS_ACUITY_SCORE: 54
ADLS_ACUITY_SCORE: 59

## 2025-01-21 NOTE — PROGRESS NOTES
[facility]  Patient is currently receiving services with [facility]. The patient is currently receiving RN PT HHA services. Patient's  and home health team have been notified that patient is under observation status. [facility] Liaison will continue to follow patient during their hospital stay. If patient is admitted to inpatient status, please provide orders to resume home care at time of discharge if appropriate.

## 2025-01-21 NOTE — PROGRESS NOTES
01/21/25 1626   Vital Signs   Oximeter Heart Rate 125 bpm   Oxygen Therapy   SpO2 93 %   O2 Device None (Room air)   Nebulizer Assessment & Treatment   $RT Use ONLY Delivery Method Nebulizer - Initial   Nebulizer Device Mouthpiece   Pretreatment Heart Rate (beats/min) 121   Pretreatment Resp Rate (breaths/min) 16   Pretreat Breath Sounds - Bilat - All Lobes clear   Pretreat Breath Sounds - KARI clear   Pretreat Breath Sounds - LLL diminished   Pretreat Breath Sounds - RUL clear   Pretreat Breath Sounds - RML diminished   Pretreat Breath Sounds - RLL diminished   Patient Position Brittany's   Respiratory Treatment Status (SVN) given   Breath Sounds Post-Respiratory Treatment   Posttreatment Heart Rate (beats/min) 103   Posttreatment Resp Rate (breaths/min) 18   Posttreatment Assessment (SVN) breath sounds improved   Signs of Intolerance (SVN) none   Breath Sounds Posttreatment All Fields aeration increased   Breath Sounds Posttreatment KARI clear   Breath Sounds Posttreatment LLL clear   Breath Sounds Posttreatment RUL clear   Breath Sounds Posttreatment RML diminished   Breath Sounds Posttreatment RLL diminished     Breath sounds slight increased air movement post neb   Patient states dyspnea improved  Patient last used home nebulizer treatment at 1100 this morning with improvement

## 2025-01-21 NOTE — ED TRIAGE NOTES
Patient arrives via EMS for shortness of breath and leg pain. Patient has visible black spot on right heel.      Triage Assessment (Adult)       Row Name 01/21/25 1450          Triage Assessment    Airway WDL WDL        Respiratory WDL    Respiratory WDL X;all     Rhythm/Pattern, Respiratory shortness of breath        Skin Circulation/Temperature WDL    Skin Circulation/Temperature WDL X  sore on right heel        Cardiac WDL    Cardiac WDL WDL        Peripheral/Neurovascular WDL    Peripheral Neurovascular WDL X  sore on right heel        Cognitive/Neuro/Behavioral WDL    Cognitive/Neuro/Behavioral WDL WDL

## 2025-01-22 ENCOUNTER — APPOINTMENT (OUTPATIENT)
Dept: ULTRASOUND IMAGING | Facility: CLINIC | Age: 65
DRG: 853 | End: 2025-01-22
Attending: STUDENT IN AN ORGANIZED HEALTH CARE EDUCATION/TRAINING PROGRAM
Payer: MEDICARE

## 2025-01-22 ENCOUNTER — DOCUMENTATION ONLY (OUTPATIENT)
Dept: ANTICOAGULATION | Facility: CLINIC | Age: 65
End: 2025-01-22

## 2025-01-22 ENCOUNTER — APPOINTMENT (OUTPATIENT)
Dept: ULTRASOUND IMAGING | Facility: CLINIC | Age: 65
DRG: 853 | End: 2025-01-22
Payer: MEDICARE

## 2025-01-22 ENCOUNTER — APPOINTMENT (OUTPATIENT)
Dept: GENERAL RADIOLOGY | Facility: CLINIC | Age: 65
DRG: 853 | End: 2025-01-22
Attending: PODIATRIST
Payer: MEDICARE

## 2025-01-22 DIAGNOSIS — I48.91 ATRIAL FIBRILLATION, UNSPECIFIED TYPE (H): Primary | ICD-10-CM

## 2025-01-22 DIAGNOSIS — Z86.73 H/O: STROKE: ICD-10-CM

## 2025-01-22 DIAGNOSIS — I63.9 CVA (CEREBRAL VASCULAR ACCIDENT) (H): ICD-10-CM

## 2025-01-22 DIAGNOSIS — Z79.01 LONG TERM CURRENT USE OF ANTICOAGULANT THERAPY: ICD-10-CM

## 2025-01-22 LAB
ANION GAP SERPL CALCULATED.3IONS-SCNC: 13 MMOL/L (ref 7–15)
ATRIAL RATE - MUSE: 114 BPM
BUN SERPL-MCNC: 54 MG/DL (ref 8–23)
C PNEUM DNA SPEC QL NAA+PROBE: NOT DETECTED
CALCIUM SERPL-MCNC: 9.8 MG/DL (ref 8.8–10.4)
CHLORIDE SERPL-SCNC: 93 MMOL/L (ref 98–107)
CREAT SERPL-MCNC: 7.65 MG/DL (ref 0.67–1.17)
DIASTOLIC BLOOD PRESSURE - MUSE: NORMAL MMHG
EGFRCR SERPLBLD CKD-EPI 2021: 7 ML/MIN/1.73M2
ERYTHROCYTE [DISTWIDTH] IN BLOOD BY AUTOMATED COUNT: 18 % (ref 10–15)
ERYTHROCYTE [DISTWIDTH] IN BLOOD BY AUTOMATED COUNT: 18 % (ref 10–15)
FLUAV H1 2009 PAND RNA SPEC QL NAA+PROBE: NOT DETECTED
FLUAV H1 RNA SPEC QL NAA+PROBE: NOT DETECTED
FLUAV H3 RNA SPEC QL NAA+PROBE: NOT DETECTED
FLUAV RNA SPEC QL NAA+PROBE: NOT DETECTED
FLUBV RNA SPEC QL NAA+PROBE: NOT DETECTED
GLUCOSE BLDC GLUCOMTR-MCNC: 120 MG/DL (ref 70–99)
GLUCOSE BLDC GLUCOMTR-MCNC: 126 MG/DL (ref 70–99)
GLUCOSE BLDC GLUCOMTR-MCNC: 139 MG/DL (ref 70–99)
GLUCOSE BLDC GLUCOMTR-MCNC: 186 MG/DL (ref 70–99)
GLUCOSE SERPL-MCNC: 129 MG/DL (ref 70–99)
HADV DNA SPEC QL NAA+PROBE: NOT DETECTED
HCO3 SERPL-SCNC: 25 MMOL/L (ref 22–29)
HCOV PNL SPEC NAA+PROBE: NOT DETECTED
HCT VFR BLD AUTO: 33.8 % (ref 40–53)
HCT VFR BLD AUTO: 38 % (ref 40–53)
HGB BLD-MCNC: 10.8 G/DL (ref 13.3–17.7)
HGB BLD-MCNC: 12.2 G/DL (ref 13.3–17.7)
HMPV RNA SPEC QL NAA+PROBE: NOT DETECTED
HOLD SPECIMEN: NORMAL
HPIV1 RNA SPEC QL NAA+PROBE: NOT DETECTED
HPIV2 RNA SPEC QL NAA+PROBE: NOT DETECTED
HPIV3 RNA SPEC QL NAA+PROBE: NOT DETECTED
HPIV4 RNA SPEC QL NAA+PROBE: NOT DETECTED
INR PPP: 1.78 (ref 0.85–1.15)
INR PPP: 2.7 (ref 0.85–1.15)
INTERPRETATION ECG - MUSE: NORMAL
M PNEUMO DNA SPEC QL NAA+PROBE: NOT DETECTED
MCH RBC QN AUTO: 30.9 PG (ref 26.5–33)
MCH RBC QN AUTO: 31 PG (ref 26.5–33)
MCHC RBC AUTO-ENTMCNC: 32 G/DL (ref 31.5–36.5)
MCHC RBC AUTO-ENTMCNC: 32.1 G/DL (ref 31.5–36.5)
MCV RBC AUTO: 96 FL (ref 78–100)
MCV RBC AUTO: 97 FL (ref 78–100)
MRSA DNA SPEC QL NAA+PROBE: POSITIVE
P AXIS - MUSE: NORMAL DEGREES
PLATELET # BLD AUTO: 457 10E3/UL (ref 150–450)
PLATELET # BLD AUTO: 467 10E3/UL (ref 150–450)
POTASSIUM SERPL-SCNC: 4.2 MMOL/L (ref 3.4–5.3)
PR INTERVAL - MUSE: 168 MS
QRS DURATION - MUSE: 100 MS
QT - MUSE: 352 MS
QTC - MUSE: 485 MS
R AXIS - MUSE: -88 DEGREES
RBC # BLD AUTO: 3.49 10E6/UL (ref 4.4–5.9)
RBC # BLD AUTO: 3.94 10E6/UL (ref 4.4–5.9)
RSV RNA SPEC QL NAA+PROBE: NOT DETECTED
RSV RNA SPEC QL NAA+PROBE: NOT DETECTED
RV+EV RNA SPEC QL NAA+PROBE: NOT DETECTED
SA TARGET DNA: POSITIVE
SODIUM SERPL-SCNC: 131 MMOL/L (ref 135–145)
SYSTOLIC BLOOD PRESSURE - MUSE: NORMAL MMHG
T AXIS - MUSE: 89 DEGREES
UFH PPP CHRO-ACNC: <0.1 IU/ML
VENTRICULAR RATE- MUSE: 114 BPM
WBC # BLD AUTO: 13 10E3/UL (ref 4–11)
WBC # BLD AUTO: 13.6 10E3/UL (ref 4–11)

## 2025-01-22 PROCEDURE — 250N000013 HC RX MED GY IP 250 OP 250 PS 637

## 2025-01-22 PROCEDURE — 120N000001 HC R&B MED SURG/OB

## 2025-01-22 PROCEDURE — 36415 COLL VENOUS BLD VENIPUNCTURE: CPT | Performed by: HOSPITALIST

## 2025-01-22 PROCEDURE — 90945 DIALYSIS ONE EVALUATION: CPT | Performed by: INTERNAL MEDICINE

## 2025-01-22 PROCEDURE — 93971 EXTREMITY STUDY: CPT | Mod: RT

## 2025-01-22 PROCEDURE — 85610 PROTHROMBIN TIME: CPT | Performed by: INTERNAL MEDICINE

## 2025-01-22 PROCEDURE — 87340 HEPATITIS B SURFACE AG IA: CPT | Performed by: INTERNAL MEDICINE

## 2025-01-22 PROCEDURE — 85014 HEMATOCRIT: CPT | Performed by: HOSPITALIST

## 2025-01-22 PROCEDURE — 258N000003 HC RX IP 258 OP 636: Performed by: INTERNAL MEDICINE

## 2025-01-22 PROCEDURE — 73650 X-RAY EXAM OF HEEL: CPT | Mod: RT

## 2025-01-22 PROCEDURE — 250N000013 HC RX MED GY IP 250 OP 250 PS 637: Performed by: HOSPITALIST

## 2025-01-22 PROCEDURE — 93970 EXTREMITY STUDY: CPT

## 2025-01-22 PROCEDURE — 250N000011 HC RX IP 250 OP 636: Performed by: STUDENT IN AN ORGANIZED HEALTH CARE EDUCATION/TRAINING PROGRAM

## 2025-01-22 PROCEDURE — 85610 PROTHROMBIN TIME: CPT | Performed by: HOSPITALIST

## 2025-01-22 PROCEDURE — 99222 1ST HOSP IP/OBS MODERATE 55: CPT | Mod: 24 | Performed by: SURGERY

## 2025-01-22 PROCEDURE — 250N000011 HC RX IP 250 OP 636: Performed by: INTERNAL MEDICINE

## 2025-01-22 PROCEDURE — 93005 ELECTROCARDIOGRAM TRACING: CPT

## 2025-01-22 PROCEDURE — 99222 1ST HOSP IP/OBS MODERATE 55: CPT | Mod: 25 | Performed by: INTERNAL MEDICINE

## 2025-01-22 PROCEDURE — 85520 HEPARIN ASSAY: CPT | Performed by: HOSPITALIST

## 2025-01-22 PROCEDURE — 82310 ASSAY OF CALCIUM: CPT | Performed by: HOSPITALIST

## 2025-01-22 PROCEDURE — 250N000011 HC RX IP 250 OP 636: Performed by: HOSPITALIST

## 2025-01-22 PROCEDURE — 80048 BASIC METABOLIC PNL TOTAL CA: CPT | Performed by: HOSPITALIST

## 2025-01-22 PROCEDURE — 250N000012 HC RX MED GY IP 250 OP 636 PS 637: Performed by: HOSPITALIST

## 2025-01-22 PROCEDURE — 5A1D70Z PERFORMANCE OF URINARY FILTRATION, INTERMITTENT, LESS THAN 6 HOURS PER DAY: ICD-10-PCS | Performed by: INTERNAL MEDICINE

## 2025-01-22 PROCEDURE — 258N000003 HC RX IP 258 OP 636: Performed by: STUDENT IN AN ORGANIZED HEALTH CARE EDUCATION/TRAINING PROGRAM

## 2025-01-22 PROCEDURE — 36415 COLL VENOUS BLD VENIPUNCTURE: CPT | Performed by: INTERNAL MEDICINE

## 2025-01-22 PROCEDURE — 3E1M39Z IRRIGATION OF PERITONEAL CAVITY USING DIALYSATE, PERCUTANEOUS APPROACH: ICD-10-PCS | Performed by: INTERNAL MEDICINE

## 2025-01-22 PROCEDURE — 99233 SBSQ HOSP IP/OBS HIGH 50: CPT | Performed by: HOSPITALIST

## 2025-01-22 PROCEDURE — 93926 LOWER EXTREMITY STUDY: CPT | Mod: RT

## 2025-01-22 PROCEDURE — 90945 DIALYSIS ONE EVALUATION: CPT

## 2025-01-22 PROCEDURE — 99221 1ST HOSP IP/OBS SF/LOW 40: CPT | Performed by: PODIATRIST

## 2025-01-22 RX ORDER — METOPROLOL TARTRATE 1 MG/ML
2.5 INJECTION, SOLUTION INTRAVENOUS EVERY 4 HOURS PRN
Status: DISCONTINUED | OUTPATIENT
Start: 2025-01-22 | End: 2025-01-31

## 2025-01-22 RX ORDER — GENTAMICIN SULFATE 1 MG/G
CREAM TOPICAL DAILY PRN
Status: DISCONTINUED | OUTPATIENT
Start: 2025-01-22 | End: 2025-01-27

## 2025-01-22 RX ORDER — MIDODRINE HYDROCHLORIDE 2.5 MG/1
2.5 TABLET ORAL
Status: DISCONTINUED | OUTPATIENT
Start: 2025-01-22 | End: 2025-02-25

## 2025-01-22 RX ORDER — HEPARIN SODIUM 10000 [USP'U]/100ML
0-5000 INJECTION, SOLUTION INTRAVENOUS CONTINUOUS
Status: DISCONTINUED | OUTPATIENT
Start: 2025-01-22 | End: 2025-01-27

## 2025-01-22 RX ORDER — WARFARIN SODIUM 5 MG/1
5 TABLET ORAL
Status: DISCONTINUED | OUTPATIENT
Start: 2025-01-22 | End: 2025-01-22

## 2025-01-22 RX ADMIN — CALCITRIOL CAPSULES 0.25 MCG 0.25 MCG: 0.25 CAPSULE ORAL at 00:05

## 2025-01-22 RX ADMIN — PIPERACILLIN AND TAZOBACTAM 2.25 G: 2; .25 INJECTION, POWDER, FOR SOLUTION INTRAVENOUS at 02:28

## 2025-01-22 RX ADMIN — SEVELAMER CARBONATE 800 MG: 800 TABLET, FILM COATED ORAL at 18:35

## 2025-01-22 RX ADMIN — CALCITRIOL CAPSULES 0.25 MCG 0.25 MCG: 0.25 CAPSULE ORAL at 21:18

## 2025-01-22 RX ADMIN — OXYCODONE HYDROCHLORIDE 5 MG: 5 TABLET ORAL at 21:23

## 2025-01-22 RX ADMIN — Medication 1 CAPSULE: at 08:36

## 2025-01-22 RX ADMIN — CLOPIDOGREL BISULFATE 75 MG: 75 TABLET ORAL at 21:18

## 2025-01-22 RX ADMIN — CINACALCET 60 MG: 30 TABLET ORAL at 21:18

## 2025-01-22 RX ADMIN — METOPROLOL SUCCINATE 25 MG: 25 TABLET, EXTENDED RELEASE ORAL at 08:36

## 2025-01-22 RX ADMIN — PHYTONADIONE 2 MG: 2 INJECTION, EMULSION INTRAMUSCULAR; INTRAVENOUS; SUBCUTANEOUS at 23:24

## 2025-01-22 RX ADMIN — PIPERACILLIN AND TAZOBACTAM 2.25 G: 2; .25 INJECTION, POWDER, FOR SOLUTION INTRAVENOUS at 10:07

## 2025-01-22 RX ADMIN — INSULIN GLARGINE 15 UNITS: 100 INJECTION, SOLUTION SUBCUTANEOUS at 00:05

## 2025-01-22 RX ADMIN — PANTOPRAZOLE SODIUM 40 MG: 20 TABLET, DELAYED RELEASE ORAL at 08:36

## 2025-01-22 RX ADMIN — ATORVASTATIN CALCIUM 40 MG: 40 TABLET, FILM COATED ORAL at 21:18

## 2025-01-22 RX ADMIN — ACETAMINOPHEN 650 MG: 325 TABLET, FILM COATED ORAL at 21:18

## 2025-01-22 RX ADMIN — HEPARIN SODIUM 1000 UNITS/HR: 10000 INJECTION, SOLUTION INTRAVENOUS at 23:54

## 2025-01-22 RX ADMIN — PIPERACILLIN AND TAZOBACTAM 2.25 G: 2; .25 INJECTION, POWDER, FOR SOLUTION INTRAVENOUS at 18:00

## 2025-01-22 RX ADMIN — PHYTONADIONE 2 MG: 2 INJECTION, EMULSION INTRAMUSCULAR; INTRAVENOUS; SUBCUTANEOUS at 11:20

## 2025-01-22 RX ADMIN — ALLOPURINOL 200 MG: 100 TABLET ORAL at 21:18

## 2025-01-22 RX ADMIN — INSULIN GLARGINE 15 UNITS: 100 INJECTION, SOLUTION SUBCUTANEOUS at 21:23

## 2025-01-22 ASSESSMENT — ACTIVITIES OF DAILY LIVING (ADL)
ADLS_ACUITY_SCORE: 56
DEPENDENT_IADLS:: TRANSPORTATION
ADLS_ACUITY_SCORE: 56

## 2025-01-22 NOTE — PROGRESS NOTES
Cycler set up per protocol and per treatment orders     Results from previous treatment:  Effluent color and clarity: new set up      Cycler Serial Number: [id number]  Total Treatment Volume: 10,000mL  Total treatment time: 9 hrs  Fill Volume: 2000ml  Last Fill Volume:0ml  Heater ba.5% 6000mL;  Lot #: [id number];  Expiration Date:   Side Bag #1: 2.5% 6000mL;  Lot #: [id number];  Expiration Date:     Number of cycles including final fill: 5  Dwell Time: 1:22 minutes  Last Fill Volume: 0ml  Initial Drain Alarm: 0ml  PD orders reviewed with Patient procedure and ESRD teaching done and questions answered.  Cycler ready for hook-up.    Report given to: Gelacio Daniel consent form signed verbal

## 2025-01-22 NOTE — PHARMACY-ANTICOAGULATION SERVICE
Clinical Pharmacy - Warfarin Dosing Consult     Pharmacy has been consulted to manage this patient s warfarin therapy.  Indication: Atrial Fibrillation  Therapy Goal: INR 2-3  Warfarin Prior to Admission: Yes  Warfarin PTA Regimen: 7.5 mg on Mondays and Fridays, 5 mg the rest of the days of the week.    INR   Date Value Ref Range Status   01/21/2025 2.44 (H) 0.85 - 1.15 Final     INR (External)   Date Value Ref Range Status   01/14/2025 4.7 (A) 0.9 - 1.1 Final       Recommend warfarin 5 mg today.  Pharmacy will monitor Arnulfo D Shreyas daily and order warfarin doses to achieve specified goal.      Please contact pharmacy as soon as possible if the warfarin needs to be held for a procedure or if the warfarin goals change.       Margaret Kohler, ReidD

## 2025-01-22 NOTE — PHARMACY-ADMISSION MEDICATION HISTORY
Pharmacist Admission Medication History    Admission medication history is complete. The information provided in this note is only as accurate as the sources available at the time of the update.    Information Source(s): Patient and CareEverywhere/SureScripts via phone    Pertinent Information: None    Changes made to PTA medication list:  Added: None  Deleted: Cefdinir, Duonebs  Changed: Warfarin - 5 mg 5 times weekly (Tues, Wed, Thur, Sat, Sun) and 7.5 mg twice a week on Mondays and Fridays.     Allergies reviewed with patient and updates made in EHR: yes    Medication History Completed By: Margaret Kohler RPH 1/21/2025 10:27 PM    PTA Med List   Medication Sig Last Dose/Taking    acetaminophen (TYLENOL) 500 MG tablet Take 1,000 mg by mouth every 8 hours as needed. 1/21/2025 Evening    allopurinol (ZYLOPRIM) 100 MG tablet Take 200 mg by mouth every evening 1/20/2025 Evening    atorvastatin (LIPITOR) 40 MG tablet Take 40 mg by mouth at bedtime. 1/20/2025 Evening    B Complex-C-Folic Acid (DIALYVITE) TABS Take 1 tablet by mouth daily. 1/20/2025 Morning    calcitRIOL (ROCALTROL) 0.25 MCG capsule Take 0.25 mcg by mouth at bedtime. 1/20/2025 Bedtime    cinacalcet (SENSIPAR) 60 MG tablet Take 60 mg by mouth. Take 1 tablet (60 mg) three times a week: Monday, Wednesday, and Friday nights 1/20/2025 Evening    clopidogrel (PLAVIX) 75 MG tablet Take 1 tablet (75 mg) by mouth every evening. 1/20/2025 Evening    gentamicin (GARAMYCIN) 0.1 % external cream Apply topically daily as needed. Apply topically on peritoneal access site to prevent infections 1/20/2025 Evening    glucose 40 % (400 mg/mL) gel Take 15-30 g by mouth every 15 minutes as needed for low blood sugar. Unknown    insulin glargine (LANTUS PEN) 100 UNIT/ML pen Inject 35 Units subcutaneously at bedtime. 1/20/2025 Bedtime    melatonin 5 MG tablet Take 5 mg by mouth at bedtime 1/20/2025 Bedtime    metoprolol succinate ER (TOPROL XL) 25 MG 24 hr tablet Take 1 tablet  (25 mg) by mouth daily. 1/21/2025 Evening    midodrine (PROAMATINE) 2.5 MG tablet Take 1 tablet (2.5 mg) by mouth once as needed (hypotension/dizziness post PD in AM during the day). Past Month    omeprazole (PRILOSEC) 20 MG DR capsule Take 20 mg by mouth daily. 1/20/2025 Morning    SEVELAMER CARBONATE PO Take 800 mg by mouth 3 times daily (with meals) 1/20/2025 Evening    warfarin ANTICOAGULANT (COUMADIN) 5 MG tablet Take 7.5 mg by mouth twice a week. Mondays and Fridays.. 1/20/2025 Evening    warfarin ANTICOAGULANT (COUMADIN) 5 MG tablet Take 1 tablet (5 mg) by mouth daily. (Patient taking differently: Take 5 mg by mouth five times a week. Tues, Wed, Thurs, Saturdays and Sundays.) 1/19/2025 Evening       pt presents to Ed with complaints of needing stitches removed from right calf. endorses stitches were placed 14 days ago in ED.

## 2025-01-22 NOTE — CONSULTS
Vascular Surgery Consultation    Arnulfo Pritchett MRN# [id number]   Age: 64 year old YOB: 1960     Date of Admission:  1/21/2025    Date of Consult:   01/22/25    Reason for consult: RLE pain       Requesting service: Hospitalist; requesting provider: Dr. Schwarz                   Assessment and Plan:   64M with hx ESRD on PD, T2D, CAD s/p PCI, Afib (on warfarin), HFrEF, ischemic cardiomyopathy, HLD, and PAD s/p L fem endart and fem-TP trunk bypass with nonreversed GSV (10/25/2024) c/b worsening signal quality requiring revision with ligation of side branches (10/27/2024) and R SFA angioplasty and stenting (5/6/2024) who now presents with RLE ischemic rest pain and heel ulceration. ABIs showing toe pressures and flat pedal waveforms. Prior ultrasound . CTA from 10/17/2024 showing severe stenosis vs occlusion of the distal SFA and popliteal arteries. Angiogram was attempted for LLE at the time, but this was aborted due to significant disease in the R femoral vessels. Patient is quite frail, so would likely start with angiogram and noninvasives to reassess bloodflow to determine what options we have for revascularization.     - Duplex ordered for RLE, shows SFA stent is occluded  - CTA ordered  - Ok for diet today  - Vein mapping for possible bypass  - Will discuss timing of angiogram between our team and IR  - Podiatry consulted for evaluation of heel wound  - Will reverse warfarin with vitamin K, please start heparin gtt when INR <2    Discussed with staff, Dr. Gray.    Milo Carrion MD  Vascular Surgery resident             Chief Complaint:   RLE pain         History of Present Illness:   Mr. Pritchett is a 64M with hx ESRD on PD, T2D, CAD s/p PCI, Afib (on warfarin), HFrEF, ischemic cardiomyopathy, HLD, and PAD s/p L fem endart and fem-TP trunk bypass with nonreversed GSV (10/25/2024) c/b worsening signal quality requiring revision with ligation of side branches (10/27/2024) and R SFA angioplasty  and stenting (5/6/2024). He was most recently discharged from [facility] on 12/7/2024. He states that he has been having new pain of his RLE for several weeks and he has begun developing a wound on his R heel. He has not had pain in this foot before, but it is similar to the pain he had in his left foot prior to surgery. His L groin incision has completely healed and all of his incisions from his prior bypass have healed well.          Past Medical History:     Past Medical History:   Diagnosis Date    CAD (coronary artery disease)     Chronic systolic heart failure (H)     ESRD (end stage renal disease) on dialysis (H)     Gastroesophageal reflux disease without esophagitis     Gout     HLD (hyperlipidemia)     TALI (obstructive sleep apnea)     PAD (peripheral artery disease)     S/p RLE stenting of SFA/popliteal arteries    Type 2 diabetes mellitus with diabetic neuropathy (H)              Past Surgical History:     Past Surgical History:   Procedure Laterality Date    AMPUTATE TOE(S) Left 10/27/2024    Procedure: AMPUTATION, TOE left great toe;  Surgeon: Haley Joseph DPM, Podiatry/Foot and Ankle Surgery;  Location:  OR    BYPASS GRAFT FEMOROTIBIAL Left 10/25/2024    Procedure: LEFT FEMORAL ENDARTERECTOMY, LEFT FEMORAL TO TIBIAL PERONEAL TRUNK BYPASS WITH LEFT GREAT SEPHANEOUS VEIN, WOUND VAC PLACEMENT ON LEFT GROIN.;  Surgeon: Raul Gray MD;  Location:  OR    BYPASS GRAFT FEMOROTIBIAL Left 10/27/2024    Procedure: CREATION, BYPASS, VASCULAR, FEMORAL TO TIBIAL REVISION OF BYPASS LEFT COMMON FEMORAL TO TIBIAL.;  Surgeon: Raul Gray MD;  Location:  OR    CV CORONARY ANGIOGRAM N/A 11/27/2024    Procedure: Coronary Angiogram;  Surgeon: Clem Matt MD;  Location:  [facility]    CV LOWER EXTREMITY ANGIOGRAM LEFT Left 10/27/2024    Procedure: Angiogram Lower Extremity Left;  Surgeon: Raul Gray MD;  Location:  OR    CV PCI N/A 11/27/2024    Procedure:  Percutaneous Coronary Intervention;  Surgeon: Clem Matt MD;  Location:  [facility]    IR LOWER EXTREMITY ANGIOGRAM LEFT  10/17/2024             Social History:     Social History     Tobacco Use    Smoking status: Former     Types: Cigarettes    Smokeless tobacco: Never   Substance Use Topics    Alcohol use: Not Currently     Comment: quit 20 years             Family History:   No family history on file.             Allergies:   No Known Allergies          Medications:     Current Facility-Administered Medications   Medication Dose Route Frequency Provider Last Rate Last Admin    acetaminophen (TYLENOL) tablet 650 mg  650 mg Oral Q4H PRN Justin Armas MD        Or    acetaminophen (TYLENOL) Suppository 650 mg  650 mg Rectal Q4H PRN Justin Armas MD        allopurinol (ZYLOPRIM) tablet 200 mg  200 mg Oral QPM Justin Armas MD   200 mg at 01/21/25 2343    atorvastatin (LIPITOR) tablet 40 mg  40 mg Oral At Bedtime Justin Armas MD   40 mg at 01/21/25 2343    calcitRIOL (ROCALTROL) capsule 0.25 mcg  0.25 mcg Oral At Bedtime Justin Armas MD   0.25 mcg at 01/22/25 0005    calcium carbonate (TUMS) chewable tablet 1,000 mg  1,000 mg Oral 4x Daily PRN Justin Armas MD        cinacalcet (SENSIPAR) tablet 60 mg  60 mg Oral Q Mon Wed Fri AM Justin Armas MD        clopidogrel (PLAVIX) tablet 75 mg  75 mg Oral QPM Justin Armas MD   75 mg at 01/21/25 2343    glucose gel 15-30 g  15-30 g Oral Q15 Min PRN Justin Armas MD        Or    dextrose 50 % injection 25-50 mL  25-50 mL Intravenous Q15 Min PRN Justin Armas MD        Or    glucagon injection 1 mg  1 mg Subcutaneous Q15 Min PRN Justin Armas MD        gentamicin (GARAMYCIN) 0.1 % cream   Topical Daily PRN Justin Armas MD        HYDROmorphone (DILAUDID) injection 0.2 mg  0.2 mg Intravenous Q2H PRN Justin Armas MD        HYDROmorphone (DILAUDID) injection 0.4 mg  0.4 mg Intravenous Q2H PRN Justin Armas MD        insulin aspart  (NovoLOG) injection (RAPID ACTING)  1-7 Units Subcutaneous TID AC Justin Armas MD        insulin aspart (NovoLOG) injection (RAPID ACTING)  1-5 Units Subcutaneous At Bedtime Justin Armas MD        insulin glargine (LANTUS PEN) injection 15 Units  15 Units Subcutaneous At Bedtime Justin Armas MD   15 Units at 01/22/25 0005    lidocaine (LMX4) cream   Topical Q1H PRN Justin Armas MD        lidocaine 1 % 0.1-1 mL  0.1-1 mL Other Q1H PRN Justin Armas MD        metoprolol succinate ER (TOPROL-XL) 24 hr half-tab 25 mg  25 mg Oral Daily Justin Armas MD        multivitamin RENAL (TRIPHROCAPS) capsule 1 capsule  1 capsule Oral Daily Desire Manriquez, [facility]        naloxone (NARCAN) injection 0.2 mg  0.2 mg Intravenous Q2 Min PRN Blaine Corrales MD        Or    naloxone (NARCAN) injection 0.4 mg  0.4 mg Intravenous Q2 Min PRN Blaine Corrales MD        Or    naloxone (NARCAN) injection 0.2 mg  0.2 mg Intramuscular Q2 Min PRN Blaine Corrales MD        Or    naloxone (NARCAN) injection 0.4 mg  0.4 mg Intramuscular Q2 Min PRN Blaine Corrales MD        ondansetron (ZOFRAN ODT) ODT tab 4 mg  4 mg Oral Q6H PRN Justin Armas MD        Or    ondansetron (ZOFRAN) injection 4 mg  4 mg Intravenous Q6H PRN Justin Armas MD        oxyCODONE (ROXICODONE) tablet 5 mg  5 mg Oral Q4H PRN Justin Armas MD        oxyCODONE IR (ROXICODONE) half-tab 2.5 mg  2.5 mg Oral Q4H PRN Justin Armas MD        pantoprazole (PROTONIX) EC tablet 40 mg  40 mg Oral QAM AC Margaret Kohler, [facility]        Patient is already receiving anticoagulation with heparin, enoxaparin (LOVENOX), warfarin (COUMADIN)  or other anticoagulant medication   Does not apply Continuous PRN Justin Armas MD        piperacillin-tazobactam (ZOSYN) 2.25 g vial to attach to  ml bag  2.25 g Intravenous Q8H Justin Armas MD   2.25 g at 01/22/25 0228    senna-docusate (SENOKOT-S/PERICOLACE) 8.6-50 MG per tablet 1 tablet  1 tablet Oral BID PRN  Justin Armas MD        Or    senna-docusate (SENOKOT-S/PERICOLACE) 8.6-50 MG per tablet 2 tablet  2 tablet Oral BID PRN Justin Armas MD        sevelamer carbonate (RENVELA) tablet 800 mg  800 mg Oral TID w/meals Justin Armas MD        sodium chloride (PF) 0.9% PF flush 3 mL  3 mL Intracatheter Q8H Justin Armas MD        sodium chloride (PF) 0.9% PF flush 3 mL  3 mL Intracatheter q1 min prn Justin Armas MD        Warfarin Dose Required Daily - Pharmacist Managed  1 each Oral See Admin Instructions Justin Armas MD                   Review of Systems:     A 12 point review of systems was completed and found to be negative unless otherwise stated in the above HPI            Physical Exam:   BP (!) 122/94 (BP Location: Right arm, Patient Position: Semi-Soto's, Cuff Size: Adult Regular)   Pulse 110   Temp 97.6  F (36.4  C) (Oral)   Resp 16   SpO2 98%       Intake/Output Summary (Last 24 hours) at 1/22/2025 0641  Last data filed at 1/22/2025 0600  Gross per 24 hour   Intake 300 ml   Output --   Net 300 ml       GEN: A&Ox3, no acute distress  NEURO: CN II-XII grossly intact. No gross neurologic deficits  NECK: trachea midline, no JVD  HEENT: No scleral icterus.  RESP: Nonlabored breathing on room air  CV: RRR by pedal signal, noncyanotic  MSK: Moves all extremities independently. Normal active range of motion.  PSYCH: cooperative   PULSES: Monophasic L AT and PT, monophasic R popliteal          Data:   All laboratory data reviewed    Results:  BMP  Recent Labs   Lab 01/22/25  0228 01/21/25  1606   NA  --  130*   POTASSIUM  --  4.3   CHLORIDE  --  91*   CO2  --  25   BUN  --  52.4*   CR  --  7.03*   * 170*     CBC  Recent Labs   Lab 01/21/25  1606   WBC 13.8*   HGB 11.2*   *     LFT  Recent Labs   Lab 01/21/25  1606   AST 17   ALT 14   ALKPHOS 246*   BILITOTAL 0.2   ALBUMIN 2.7*   INR 2.44*     Recent Labs   Lab 01/22/25  0228 01/21/25  1606   [facility] 126* 170*       Imaging:  XR Chest Port 1  View    Result Date: 1/21/2025  EXAM: XR CHEST PORT 1 VIEW LOCATION: [facility] DATE: 1/21/2025 INDICATION: cough COMPARISON: Chest CT and radiograph 1/3/2025.     IMPRESSION: Stable size of cardiomediastinal silhouette. Slight increase in volume of now moderate right pleural effusion with associated compressive atelectasis. Left lung is grossly clear. No pneumothorax. Bones are unchanged.    US VLADIMIR Doppler No Exercise    Result Date: 1/6/2025  US VLADIMIR DOPPLER NO EXERCISE, 1-2 LEVELS, BILAT   1/6/2025 1:57 PM HISTORY: PAD (peripheral artery disease) (H) COMPARISON: 11/28/2024 FINDINGS: Right VLADIMIR: PT: 0 DP: 0 Left VLADIMIR: PT: >254 mm Hg - non compressible - biphasic/triphasic DP: >254 mm Hg - non compressible - triphasic Right Digital brachial index: 0 Left Digital Brachial index: 0.75     IMPRESSION: 1. Critical resting arterial insufficiency in the right lower extremity. 2. ABIs could not be calculated. Toe pressure of 79 mm Hg suggesting good wound healing potential. VLADIMIR CRITERIA: >0.95 Normal 0.90 - 0.94 Mild 0.5 - 0.89 Moderate 0.2 - 0.49 Severe <0.2 Critical ARMANDO FRANCIS M.D., [facility], [facility] ARMANDO FRANCIS MD   SYSTEM ID:  [id number]    US Lower Extremity Arterial Duplex Left    Result Date: 1/6/2025  Exam: US LOWER EXTREMITY ARTERIAL DUPLEX LEFT 1/6/2025 2:06 PM Clinical information: PAD (peripheral artery disease) (H), left femoral to tibio peroneal trunk bypass with saphenous vein. Comparison: None Technique: Grayscale (B-mode) assessment, color, and duplex spectral Doppler ultrasound of the left lower extremity bypass graft with velocity measurements obtained with angle correction of 60 degrees or less. Ordering provider: MASSIMO EUCEDA Findings: Velocities in centimeters per second.  Diameters in millimeters. Left common femoral artery (inflow): 28/7, 5.9 Proximal anastomosis: 43/8 Proximal graft: 40/8, 5.3 Mid graft: 70/6, 4.1 Distal graft: 64/14, 4.4 Distal anastomosis: 60/9  Native left posterior tibial artery: 27/7, 3.2 Native left peroneal artery: 36/11, 3.2     Impression: Patent left common femoral artery to tibioperoneal trunk bypass. Guidelines: [facility] duplex criteria for lower limb arterial occlusive disease -Percent stenosis- Normal (1-19%): Peak systolic velocity (cm/s): <150, End-diastolic velocity (cm/s): <40, Velocity ration (Vr): <1.5, Distal arterial waveform: Triphasic -Percent stenosis- 20-49%: Peak systolic velocity (cm/s): 150-200, End-diastolic velocity (cm/s): <40, Velocity ration (Vr): 1.5-2.0, Distal arterial waveform: Triphasic -Percent stenosis- 50-75%: Peak systolic velocity (cm/s): 200-300, End-diastolic velocity (cm/s): <90, Velocity ration (Vr): 2.0-3.9, Distal arterial waveform: Poststenotic turbulence distal to stenosis, monophasic distal waveform -Percent stenosis- >75%: Peak systolic velocity (cm/s): >300, End-diastolic velocity (cm/s): <90, Velocity ration (Vr): >4.0, Distal arterial waveform: Dampened distal waveform and low PSV/EDV* in the stenosis -Percent stenosis- Occlusion: Absent flow by color Doppler/pulsed Doppler spectral analysis; length of occlusion estimated from distance between exit and reentry collateral arteries *PSV = peak systolic velocity, EDV = end-diastolic velocity http://[email or web address]/[email or web address]/[email or web address] html ARMANDO FRANCIS M.D., [facility], [facility] ARMANDO FRANCIS MD   SYSTEM ID:  [id number]    Chest CT w/o contrast    Result Date: 1/3/2025  EXAM: CT CHEST W/O CONTRAST LOCATION: [facility] DATE: 1/3/2025 INDICATION: Shortness of breath. COMPARISON: Chest x-ray 01/03/2025 TECHNIQUE: CT chest without IV contrast. Multiplanar reformats were obtained. Dose reduction techniques were used. CONTRAST: None. FINDINGS: LUNGS AND PLEURA: Moderate to large sized right pleural effusion with compressive atelectasis involving a significant portion of the right lower lobe.  Partial collapse involving the medial aspect of the right middle lobe along the heart border with some air bronchograms. Small left pleural effusion and left basilar atelectasis. Calcified granuloma left apex. MEDIASTINUM/AXILLAE: No lymphadenopathy. No thoracic aortic aneurysm. Atherosclerotic disease thoracic aorta. CORONARY ARTERY CALCIFICATION: Severe. UPPER ABDOMEN: Ascites right upper quadrant adjacent to the liver has slightly increased. Small amount of ascites left upper quadrant adjacent to the spleen. Atherosclerotic disease abdominal aorta. Plaque at the origins of the celiac trunk and superior mesenteric artery with minimal narrowing. Plaque and moderate stenosis at the origin of the left renal artery. Plaque and narrowing at the origin of the right renal artery stump. 11 mm exophytic hyperdense nodule upper pole left kidney. MUSCULOSKELETAL: Hypertrophic changes and degenerative disc disease thoracic spine. Gynecomastia.     IMPRESSION: 1.  Moderately large sized right pleural effusion with compressive atelectasis involving a significant portion of the right lower lobe. Partial collapse/atelectasis medial aspect of the right middle lobe. 2.  Small left pleural effusion and left basilar atelectasis. 3.  Severe atherosclerotic disease. 4.  Upper abdominal ascites. 5.  11 mm hyperdense nodule upper pole left kidney is indeterminate. Small renal cell carcinoma possible. This could be further assessed with MRI.     XR Chest 2 Views    Result Date: 1/3/2025  EXAM: XR CHEST 2 VIEWS LOCATION: [facility] DATE: 1/3/2025 INDICATION: SOB COMPARISON: 12/5/2024     IMPRESSION: New moderate opacity in the right lower lung may represent atelectasis or pneumonia. Left lung clear. No pneumothorax. Small right pleural effusion suspected. No pneumothorax. Normal heart size. There is a healing fracture of the left seventh  rib posteriorly.              Patient is a 89-year-old male who is well-known to the service.  We had done a left femoral endarterectomy with a common femoral to tibioperoneal trunk bypass for critical limb ischemia.  He now presents with wounds in the right lower extremity.  Will proceed with catheter directed arteriogram of the right lower extremity and revascularization as needed.    ARMANDO FRANCIS M.D.

## 2025-01-22 NOTE — H&P
[facility]    History and Physical - Hospitalist Service       Date of Admission:  1/21/2025    Assessment & Plan      Arnulfo Pritchett is a 64 year old male who Complex medical history which includes ESRD on peritoneal dialysis, diabetes mellitus on insulin, GERD, obstructive sleep apnea, gout, coronary artery disease with multiple stents,chronic paroxysmal A-fib (s/p ablation ) , Chronic severe HFrEF, ischemic cardiomyopathy, not in acute exacerbation (20-25%),  hyperlipidemia, peripheral artery disease status left common femoral endarterectomy and a left common femoral artery to tibioperoneal trunk bypass on 25 October 2024 and Follow-up VLADIMIR Doppler on 1/6/2025 noted critical resting arterial insufficiency of the right lower extremity, renal cell carcinoma status nephrectomy who presented to outside hospital with chief complaint of leg pain along with shortness of breath or cough and was also found to have right leg ulcer along with right heel ulcer and transferred to [facility] for vascular surgery evaluation on 1/21/2025    Right leg pain along with concerns of right heel ulcer  Left lower extremity critical limb ischemia status left common femoral endarterectomy and a left common femoral artery to tibioperoneal trunk bypass on 25 October 2024  Status ligation of the great saphenous vein on 10/27/2024  Hx of acute RLE arterial occlusion s/p SFA popliteal balloon angioplasty and stenting (5/2024)   -Follows with Dr. Gray and underwent ultrasound VLADIMIR Doppler on 1/6/2025 which was concerning for critical resting arterial insufficiency in the right lower extremity  -Arterial Doppler left 1/6/2025-patent left common femoral artery to tibioperoneal trunk bypass  -PTA regimen includes Coumadin and Plavix  -INR on admit is 2.44-  -on examination of the right foot there are mildly decreased pulses and also there is eschar and ulcer over the right leg  -WBC count is mildly elevated at  13.2  -Patient at outside hospital was given Dilaudid along with Tylenol and started on IV Zosyn and vascular surgery wanted him to be transferred for evaluation  -On exam he does have faint pulses over the dorsal pedis on the right  -Continue with IV Zosyn  -Continue with Coumadin and Plavix and daily INR check  -Vascular surgery consult  -Order MRSA swab  -Pain control with Tylenol and oxycodone  -Neurovascular checks every 4 hours  -Will make him n.p.o. after midnight in case vascular surgery wants to do any sort of intervention    Cough and shortness of breath with recent pneumonia  Moderate right pleural effusion  -On admission presented with shortness of breath and cough which is nonproductive and patient was recently diagnosed with pneumonia on 1/3 and completed course of cefdinir and is taking twice daily nebulization treatments  -White count is mildly elevated at 13.8  -COVID-19, influenza and RSV is negative  -Chest x-ray was done which showed slight increase in volume of the now moderate right pleural effusion with associated compressive atelectasis without any pneumothorax  -Encourage incentive spirometer  -Check respiratory panel  -As needed cough medicine  -Will see how he is doing clinically and if he continues to have cough and shortness of breath then may benefit from thoracentesis but need to be evaluated on 1/20 2 AM     H/ of chronic paroxysmal A-fib (s/p ablation )  -Patient has been tachycardic at outside hospital and did receive DuoNebs  -Will repeat EKG  -cardiac monitor  -Continue with PTA metoprolol succinate 25 mg dailyand received a dose this evening  -Continue with Coumadin with pharmacy to dose    Coronary artery disease status multiple stents  Hyperlipidemia  Chronic severe HFrEF, ischemic cardiomyopathy, not in acute exacerbation (20-25%):   -Continue with PTA beta-blocker, statin    Diabetes mellitus  -Start moderate dose sliding scale insulin  -Will start him on  Lantus 15 Units    -Moderate carbohydrate diet  -Hypoglycemia protocol    ESRD on peritoneal dialysis  Secondary hyperparathyroidism  -Did speak with Dr. Lomeli from nephrology and they will not be able to set up the psych clearance after hours and down to the nurses on the surgery floor able to do the manual cycler and he did mention that they will set up in the morning  -Continue with PTA sevelamer and Sensipar     Chronic hyponatremia  -Patient sodium during the course of recent few months have been fluctuating between early 130  -Sodium on admit is 130  -Continue to monitor    Anemia of chronic disease  -His baseline hemoglobin fluctuates between 10.2-11.4  -Hemoglobin on admit is 11.2  -Continue to monitor    GERD  -Will continue with PTA PPI    Gout  -Will contact you with PTA allopurinol     Renal cell carcinoma status post right nephrectomy  - Noted    Obstructive sleep apnea  -Noted and seems intolerant to CPAP          Diet:  Moderate carbohydrate diet  DVT Prophylaxis: Warfarin  Rosario Catheter: Not present  Lines: None     Cardiac Monitoring: ACTIVE order. Indication: Tachyarrhythmias, acute (48 hours)  Code Status:   full code and d/w pt     Clinically Significant Risk Factors Present on Admission         # Hyponatremia: Lowest Na = 130 mmol/L in last 2 days, will monitor as appropriate  # Hypochloremia: Lowest Cl = 91 mmol/L in last 2 days, will monitor as appropriate   # Hypercalcemia: corrected calcium is >10.1, will monitor as appropriate    # Hypoalbuminemia: Lowest albumin = 2.7 g/dL at 1/21/2025  4:06 PM, will monitor as appropriate  # Drug Induced Coagulation Defect: home medication list includes an anticoagulant medication  # Drug Induced Platelet Defect: home medication list includes an antiplatelet medication   # Hypertension: Noted on problem list               # Financial/Environmental Concerns:           Disposition Plan     Medically Ready for Discharge: Anticipated in 2-4 Days, will need ischemic workup  and evaluation by the vascular team and nephrology team           Justin Armas MD  Hospitalist Service  [facility]  Securely message with [employer] (more info)  Text page via [employer] Paging/Directory     This note was completed in part using dictation via the Dragon voice recognition software. Some word and grammatical errors may occur and must be interpreted in the appropriate clinical context. If there are any questions pertaining to this issue, please contact me for further clarification.      I am on admitting shift and his care in the morning will be taken over by one of my colleagues from hospital medicine team    ______________________________________________________________________    Chief Complaint     Shortness of breath, leg pain and spot on the right heel    History is obtained from the patient, review of the EMR    History of Present Illness     Arnulfo Pritchett is a 64 year old male who Complex medical history which includes ESRD on peritoneal dialysis, diabetes mellitus on insulin, GERD, obstructive sleep apnea, gout, coronary artery disease with multiple stents,chronic paroxysmal A-fib (s/p ablation ) , Chronic severe HFrEF, ischemic cardiomyopathy, not in acute exacerbation (20-25%),  hyperlipidemia, peripheral artery disease status left common femoral endarterectomy and a left common femoral artery to tibioperoneal trunk bypass on 25 October 2024 and Follow-up VLADIMIR Doppler on 1/6/2025 noted critical resting arterial insufficiency of the right lower extremity, renal cell carcinoma status nephrectomy who presented to outside hospital with chief complaint of leg pain along with shortness of breath or cough and was also found to have right leg ulcer along with right heel ulcer and transferred to [facility] for vascular surgery evaluation on 1/21/2025.    Patient mentioned that his home health nurse came today and she noticed that there was sore on his right outer leg along  with right heel ulcer and last checked was 2 weeks back by the same nurse and it was significantly worse.  Patient also complained of significant pain over the right lower extremity.  Patient denied any local trauma.  He also mentioned that he has been having a productive cough and was seen in the ED at outside hospital on 1/3/2025 and was diagnosed with pneumonia and completed course of cefdinir and has been using nebulizer treatment twice daily with improvement in breathing but continues to have cough and denied any chest pain.      Outside ER lab work and course    Sodium of 130, potassium of 4.3, chloride of 91, bicarb of 25, BUN of 52.4 with creatinine of 7.03 with GFR of 8, CRP elevated at 191.74 with blood glucose of 170 and lactic acid at 1    WBC count of 13.8, hemoglobin of 11.2 with platelet count of 480 and INR of 2.44    COVID-19, influenza and RSV negative    Chest x-ray was done which showed slight increase in volume of now moderate right pleural effusion with associated compressive atelectasis with no pneumothorax    At outside ER patient was given Tylenol, oral Dilaudid, 25 mg of metoprolol succinate and IV Zosyn      Past Medical History    Past Medical History:   Diagnosis Date    CAD (coronary artery disease)     Chronic systolic heart failure (H)     ESRD (end stage renal disease) on dialysis (H)     Gastroesophageal reflux disease without esophagitis     Gout     HLD (hyperlipidemia)     TALI (obstructive sleep apnea)     PAD (peripheral artery disease)     S/p RLE stenting of SFA/popliteal arteries    Type 2 diabetes mellitus with diabetic neuropathy (H)        Past Surgical History   Past Surgical History:   Procedure Laterality Date    AMPUTATE TOE(S) Left 10/27/2024    Procedure: AMPUTATION, TOE left great toe;  Surgeon: Haley Joseph DPM, Podiatry/Foot and Ankle Surgery;  Location: [facility] OR    BYPASS GRAFT FEMOROTIBIAL Left 10/25/2024    Procedure: LEFT FEMORAL ENDARTERECTOMY, LEFT FEMORAL  TO TIBIAL PERONEAL TRUNK BYPASS WITH LEFT GREAT SEPHANEOUS VEIN, WOUND VAC PLACEMENT ON LEFT GROIN.;  Surgeon: Raul rGay MD;  Location: [facility] OR    BYPASS GRAFT FEMOROTIBIAL Left 10/27/2024    Procedure: CREATION, BYPASS, VASCULAR, FEMORAL TO TIBIAL REVISION OF BYPASS LEFT COMMON FEMORAL TO TIBIAL.;  Surgeon: Raul Gray MD;  Location:  OR    CV CORONARY ANGIOGRAM N/A 11/27/2024    Procedure: Coronary Angiogram;  Surgeon: Clem Matt MD;  Location:  HEART CARDIAC CATH LAB    CV LOWER EXTREMITY ANGIOGRAM LEFT Left 10/27/2024    Procedure: Angiogram Lower Extremity Left;  Surgeon: Raul Gray MD;  Location:  OR    CV PCI N/A 11/27/2024    Procedure: Percutaneous Coronary Intervention;  Surgeon: Clem Matt MD;  Location:  HEART CARDIAC CATH LAB    IR LOWER EXTREMITY ANGIOGRAM LEFT  10/17/2024       Prior to Admission Medications   Prior to Admission Medications   Prescriptions Last Dose Informant Patient Reported? Taking?   B Complex-C-Folic Acid (DIALYVITE) TABS  Nursing Home Yes No   Sig: Take 1 tablet by mouth daily.   Continuous Glucose Sensor (DEXCOM G7 SENSOR) MISC   No No   Sig: Change every 10 days.   SEVELAMER CARBONATE PO  Nursing Home Yes No   Sig: Take 800 mg by mouth 3 times daily (with meals)   acetaminophen (TYLENOL) 500 MG tablet  Nursing Home Yes No   Sig: Take 1,000 mg by mouth every 8 hours as needed.   allopurinol (ZYLOPRIM) 100 MG tablet  Nursing Home Yes No   Sig: Take 200 mg by mouth every evening   atorvastatin (LIPITOR) 40 MG tablet  Nursing Home Yes No   Sig: Take 40 mg by mouth at bedtime.   calcitRIOL (ROCALTROL) 0.25 MCG capsule  Nursing Home Yes No   Sig: Take 0.25 mcg by mouth at bedtime.   cefdinir (OMNICEF) 300 MG capsule   No No   Sig: Take 1 capsule (300 mg) by mouth every 48 hours.   cinacalcet (SENSIPAR) 60 MG tablet  Nursing Home Yes No   Sig: Take 60 mg by mouth. Take 1 tablet (60 mg) three times a week: Monday,  Wednesday, and Friday nights   clopidogrel (PLAVIX) 75 MG tablet   No No   Sig: Take 1 tablet (75 mg) by mouth every evening.   gentamicin (GARAMYCIN) 0.1 % external cream  Nursing Home Yes No   Sig: Apply topically daily as needed. Apply topically on peritoneal access site to prevent infections   glucose 40 % (400 mg/mL) gel  Nursing Home No No   Sig: Take 15-30 g by mouth every 15 minutes as needed for low blood sugar.   insulin glargine (LANTUS PEN) 100 UNIT/ML pen   No No   Sig: Inject 35 Units subcutaneously at bedtime.   ipratropium - albuterol 0.5 mg/2.5 mg/3 mL (DUONEB) 0.5-2.5 (3) MG/3ML neb solution   No No   Sig: Take 1 vial (3 mLs) by nebulization every 6 hours as needed for shortness of breath, wheezing or cough.   melatonin 5 MG tablet  Nursing Home Yes No   Sig: Take 5 mg by mouth at bedtime   metoprolol succinate ER (TOPROL XL) 25 MG 24 hr tablet   No No   Sig: Take 1 tablet (25 mg) by mouth daily.   midodrine (PROAMATINE) 2.5 MG tablet  Nursing Home No No   Sig: Take 1 tablet (2.5 mg) by mouth once as needed (hypotension/dizziness post PD in AM during the day).   omeprazole (PRILOSEC) 20 MG DR capsule  Nursing Home Yes No   Sig: Take 20 mg by mouth daily.   warfarin ANTICOAGULANT (COUMADIN) 5 MG tablet   No No   Sig: Take 1 tablet (5 mg) by mouth daily.      Facility-Administered Medications: None           Physical Exam   Vital Signs: Temp: 97.4  F (36.3  C) Temp src: Oral BP: (!) 125/94 Pulse: 119   Resp: 16 SpO2: 94 % O2 Device: None (Room air)    Weight: 0 lbs 0 oz        General: Patient appears comfortable and in no acute distress.  HEENT: Head is atraumatic, normocephalic.    Neck: Neck is supple and No Lymphadenopathy   Respiratory: Lungs are clear to auscultation bilaterally with no wheeze or crackles and decreased over the right lung base  Cardiovascular: Regular rate , S1 and S2 normal with no murmer or rubs or gallops  Abdomen:   soft , non tender , non distended and bowel sound present    Skin: Eschar over the right heel and his extremity is warm  Vascular: Faint dorsal pedis on the right  Neurologic: No focal deficits  Musculoskeletal: Normal Range of motion over upper and lower extremities bilaterally   Psychiatric: cooperative      Medical Decision Making       Time spent in care of patient is 77 minutes and I reviewed labs including CBC, comprehensive metabolic panel, CRP, INR, x-ray chest and discussed plan of care and reviewed with the patient, nursing staff and I have also reviewed his recent imaging done on 1/6/2025 and discussed plan of care in detail with the patient and nursing staff      Data     I have personally reviewed the following data over the past 24 hrs:    13.8 (H)  \   11.2 (L)   / 480 (H)     130 (L) 91 (L) 52.4 (H) /  170 (H)   4.3 25 7.03 (H) \     ALT: 14 AST: 17 AP: 246 (H) TBILI: 0.2   ALB: 2.7 (L) TOT PROTEIN: 6.9 LIPASE: N/A     Procal: N/A CRP: 191.74 (H) Lactic Acid: 1.0       INR:  2.44 (H) PTT:  N/A   D-dimer:  N/A Fibrinogen:  N/A       Imaging results reviewed over the past 24 hrs:   Recent Results (from the past 24 hours)   XR Chest Port 1 View    Narrative    EXAM: XR CHEST PORT 1 VIEW  LOCATION: [facility]  DATE: 1/21/2025    INDICATION: cough  COMPARISON: Chest CT and radiograph 1/3/2025.      Impression    IMPRESSION: Stable size of cardiomediastinal silhouette. Slight increase in volume of now moderate right pleural effusion with associated compressive atelectasis. Left lung is grossly clear. No pneumothorax. Bones are unchanged.

## 2025-01-22 NOTE — PROGRESS NOTES
Care Management Follow Up    Length of Stay (days): 1    Expected Discharge Date: 01/24/2025     Concerns to be Addressed: discharge planning     Patient plan of care discussed at interdisciplinary rounds: Yes    Anticipated Discharge Disposition:        Anticipated Discharge Services:    Anticipated Discharge DME:      Patient/family educated on Medicare website which has current facility and service quality ratings:    Education Provided on the Discharge Plan:    Patient/Family in Agreement with the Plan:      Referrals Placed by CM/SW:    Private pay costs discussed: Not applicable    Discussed  Partnership in Safe Discharge Planning  document with patient/family: Yes:     Handoff Completed: Yes, non-[facility] PCP: External handoff communication completed    Additional Information:  Patient asked writer to send a referral for HOME CARE at     [facility]  [address] [place] [address] [phone]  Office [phone]  Email [email or web address]  [email or web address]    Writer sent through [facility]       KATELYN Duarte

## 2025-01-22 NOTE — ED PROVIDER NOTES
"  History     Chief Complaint   Patient presents with    Shortness of Breath    Leg Pain     HPI  History per patient, medical records    This is a 64-year-old male, history of end-stage renal disease on peritoneal dialysis nightly, renal cell carcinoma status post right nephrectomy, type 2 diabetes, anemia, atrial fibrillation on Coumadin, gout, coronary artery disease and cardiomyopathy, hypertension, hyperlipidemia, CVA, TALI, peripheral artery disease presenting with leg pain, shortness of breath.  Patient had a left common femoral endarterectomy and a left common femoral artery to tibioperoneal trunk bypass on 25 October 2024.  Follow-up VLADIMIR Doppler on 1/6/2025 noted critical resting arterial insufficiency of the right lower extremity.  Patient states that his home health nurse came today and noted a sore on his right outer leg and a large right heel ulcer and sent him to the ED.  Apparently she checked it 2 weeks ago and it is significantly worse today.  He does have pain in the right lower extremity.  He denies any known trauma but states he has \"circulatory issues\".    In addition, patient has been having low-grade temperatures to 99.3 along with productive cough.  He was diagnosed with right middle lobe pneumonia and noted to have a pleural effusion on an ED visit on 1/3/2025.  He completed a course of cefdinir.  He has been using nebulizer treatment twice daily at home with improvement in his breathing but continues to cough.  He denies chest pain.    Allergies:  No Known Allergies    Problem List:    Patient Active Problem List    Diagnosis Date Noted    Cellulitis 01/21/2025     Priority: Medium    Moderate protein-calorie malnutrition 12/27/2024     Priority: Medium    Orthostatic hypotension 12/05/2024     Priority: Medium    Hypovolemia 12/05/2024     Priority: Medium    NSTEMI (non-ST elevated myocardial infarction) (H) 11/27/2024     Priority: Medium    Local skin infection 11/07/2024     Priority: " Medium    Foot infection 10/15/2024     Priority: Medium    Long term current use of anticoagulant therapy 10/08/2024     Priority: Medium    Sleep apnea 08/23/2024     Priority: Medium     Formatting of this note might be different from the original.   not on cpap      Dependence on renal dialysis 07/10/2024     Priority: Medium    Type 2 diabetes mellitus with left diabetic foot ulcer (H) 07/10/2024     Priority: Medium    Hyperkalemia 05/06/2024     Priority: Medium    Ischemic leg 05/06/2024     Priority: Medium    Right shoulder tendinitis 12/08/2023     Priority: Medium    Iron deficiency anemia, unspecified 06/29/2023     Priority: Medium    Hypercoagulable state due to chronic atrial fibrillation (H) 04/27/2023     Priority: Medium    H/O: stroke 04/10/2023     Priority: Medium    Anaphylactic shock, unspecified, initial encounter 03/31/2023     Priority: Medium    Staphylococcus aureus pneumonia (H) 01/12/2023     Priority: Medium    Hemoptysis 01/01/2023     Priority: Medium    Other disorders of phosphorus metabolism 11/22/2022     Priority: Medium    Ischemic cardiomyopathy 11/07/2022     Priority: Medium    Pleural effusion, not elsewhere classified 10/18/2022     Priority: Medium    Anemia in chronic kidney disease 08/30/2022     Priority: Medium    Personal history of other malignant neoplasm of kidney 08/30/2022     Priority: Medium    Secondary hyperparathyroidism of renal origin 08/30/2022     Priority: Medium    Coagulation defect 07/20/2022     Priority: Medium    Pain, unspecified 07/20/2022     Priority: Medium    Type 2 diabetes mellitus with chronic kidney disease (H) 07/20/2022     Priority: Medium    Iron deficiency 03/31/2022     Priority: Medium    Diabetes mellitus due to underlying condition, controlled, with chronic kidney disease on chronic dialysis, with long-term current use of insulin (H) 12/07/2021     Priority: Medium    Venous stasis 09/27/2021     Priority: Medium    Renal mass  09/02/2021     Priority: Medium    ED (erectile dysfunction) 06/23/2021     Priority: Medium    Peritoneal dialysis catheter in situ 06/23/2021     Priority: Medium    Atrial fibrillation (H) 05/25/2021     Priority: Medium     Formatting of this note might be different from the original.   7/12/2021 Holter Monitor: 100% afib with rates varying from 66 to 162 beats per minute      Kidney transplant candidate 05/21/2021     Priority: Medium     Formatting of this note might be different from the original.   Awaiting right nx after 7 cm right renal mass found for tx eval      Anemia due to chronic kidney disease, on chronic dialysis (H) 04/23/2021     Priority: Medium    ESRD on peritoneal dialysis (H) 04/23/2021     Priority: Medium    Chronic gout due to renal impairment of multiple sites without tophus 12/09/2019     Priority: Medium    TALI on CPAP 12/09/2019     Priority: Medium    Hyperglycemia, drug-induced 09/27/2019     Priority: Medium    Nephrotic range proteinuria 03/05/2019     Priority: Medium    Cardiomyopathy (H) 02/09/2018     Priority: Medium     Formatting of this note might be different from the original.   8/17/2021 Echo EF 20-25%   7/19/18- per echo: ejection fraction is 50-55%.   12/4/2017- per echo:  ejection fraction is 40-45%.      Essential (primary) hypertension 12/27/2017     Priority: Medium    CVA (cerebral vascular accident) (H) 12/27/2017     Priority: Medium     Formatting of this note might be different from the original.   Formatting of this note might be different from the original.   2015 problems with vision and expressive aphasia resolved rapidly within days.  Formatting of this note might be different from the original.   2015 problems with vision and expressive aphasia resolved rapidly within days.      Diabetes mellitus type 2, insulin dependent (H) 12/27/2017     Priority: Medium    ST elevation myocardial infarction involving right coronary artery (H) 12/12/2017      Priority: Medium    Coronary artery disease involving native coronary artery of native heart with angina pectoris 12/03/2017     Priority: Medium     Formatting of this note might be different from the original.     05/21/21:  ALESIA- OM1 (2.5 x 12 Synergy postdilated 2.75 balloon); [facility]IA from [facility] into OM2 (3.0 x 12 Synergy postdialted w/ 3.75 balloon)     12/3/17: ALESIA- RCA (3 x 26 Oden)      Coronary atherosclerosis 12/03/2017     Priority: Medium     Formatting of this note might be different from the original.   12/3/17-3 x 26 susan ALESIA RCA      Chronic systolic congestive heart failure (H) 11/27/2017     Priority: Medium    Type 2 DM with hypertension and ESRD on dialysis (H) 03/12/2017     Priority: Medium     Formatting of this note might be different from the original.     Diabetic nephropathy and hypertensive nephrosclerosis clinically     Referred to [facility] for transplant and found to have a right renal mass 3-2021. Also multiple cystic lesions in both kidneys including completx cysts in the left.     Initiated PD 5/3/93306, catheter placed 4/16/2021      Dyslipidemia 01/11/2016     Priority: Medium    Systolic heart failure (H) 07/17/2015     Priority: Medium    Chronic kidney disease, stage IV (severe) (H) 07/15/2015     Priority: Medium    Hypertensive urgency 07/15/2015     Priority: Medium    Ischemic stroke (H) 07/15/2015     Priority: Medium    Left homonymous hemianopsia 07/15/2015     Priority: Medium    Other specified abnormal findings of blood chemistry 07/15/2015     Priority: Medium    Type 2 diabetes mellitus (H) 07/15/2015     Priority: Medium    Gout 06/13/2011     Priority: Medium    Mixed hypercholesterolemia and hypertriglyceridemia 06/29/2009     Priority: Medium        Past Medical History:    Past Medical History:   Diagnosis Date    CAD (coronary artery disease)     Chronic systolic heart failure (H)     ESRD (end stage renal disease) on dialysis (H)     Gastroesophageal reflux disease  without esophagitis     Gout     HLD (hyperlipidemia)     TALI (obstructive sleep apnea)     PAD (peripheral artery disease)     Type 2 diabetes mellitus with diabetic neuropathy (H)        Past Surgical History:    Past Surgical History:   Procedure Laterality Date    AMPUTATE TOE(S) Left 10/27/2024    Procedure: AMPUTATION, TOE left great toe;  Surgeon: Haley Joseph DPM, Podiatry/Foot and Ankle Surgery;  Location: [facility] OR    BYPASS GRAFT FEMOROTIBIAL Left 10/25/2024    Procedure: LEFT FEMORAL ENDARTERECTOMY, LEFT FEMORAL TO TIBIAL PERONEAL TRUNK BYPASS WITH LEFT GREAT SEPHANEOUS VEIN, WOUND VAC PLACEMENT ON LEFT GROIN.;  Surgeon: Raul Gray MD;  Location: [facility] OR    BYPASS GRAFT FEMOROTIBIAL Left 10/27/2024    Procedure: CREATION, BYPASS, VASCULAR, FEMORAL TO TIBIAL REVISION OF BYPASS LEFT COMMON FEMORAL TO TIBIAL.;  Surgeon: Raul Gray MD;  Location:  OR    CV CORONARY ANGIOGRAM N/A 11/27/2024    Procedure: Coronary Angiogram;  Surgeon: Clem Matt MD;  Location:  [facility]ARD[facility] C[facility] LAB    CV LOWER EXTREMITY ANGIOGRAM LEFT Left 10/27/2024    Procedure: Angiogram Lower Extremity Left;  Surgeon: Raul Gray MD;  Location:  OR    CV PCI N/A 11/27/2024    Procedure: Percutaneous Coronary Intervention;  Surgeon: Clem Matt MD;  Location:  HEART CARDIAC CATH LAB    IR LOWER EXTREMITY ANGIOGRAM LEFT  10/17/2024       Family History:    No family history on file.    Social History:  Marital Status:   [2]  Social History     Tobacco Use    Smoking status: Former     Types: Cigarettes    Smokeless tobacco: Never   Vaping Use    Vaping status: Never Used   Substance Use Topics    Alcohol use: Not Currently     Comment: quit 20 years    Drug use: Never        Medications:    No current outpatient medications on file.        Review of Systems  All other ROS reviewed and are negative or non-contributory except as stated in HPI.     Physical Exam   BP: (!)  "123/96  Pulse: (!) 121  Temp: 98.3  F (36.8  C)  Resp: 20  Height: 185.4 cm (6' 1\")  Weight: 75.8 kg (167 lb)  SpO2: 99 %      Physical Exam  Vitals and nursing note reviewed.   Constitutional:       Appearance: He is well-developed.      Comments: Very pleasant, thin gentleman sitting in the bed.  Intermittent loose wet coughs   HENT:      Head: Normocephalic.      Nose: Nose normal.      Mouth/Throat:      Pharynx: Oropharynx is clear.   Eyes:      Extraocular Movements: Extraocular movements intact.      Comments: Mild conjunctival injection   Cardiovascular:      Rate and Rhythm: Regular rhythm. Tachycardia present.      Comments: Unable to palpate right pedal pulse.  Left pedal pulse strong.  Normal radial pulses  Pulmonary:      Comments: Decreased breath sounds on the right with crackles  Abdominal:      Palpations: Abdomen is soft.      Comments: Peritoneal dialysis catheter in place, site is clean/dry/intact   Musculoskeletal:      Cervical back: Normal range of motion and neck supple.      Comments: Please see pictures.  Right heel black.  Red streaking on the dorsum of the foot extending up the leg.  Erythematous tender area on the right proximal fibula area.  No edema.  Well-healed left partial great toe amputation    Skin:     General: Skin is warm and dry.   Neurological:      General: No focal deficit present.      Mental Status: He is alert and oriented to person, place, and time.   Psychiatric:         Mood and Affect: Mood normal.         Behavior: Behavior normal.         ED Course (with Medical Decision Making)    Pt seen and examined by me.  RN and EPIC notes reviewed.       Patient with severe right lower extremity peripheral artery disease, noted recently on Doppler ultrasound, recent left-sided bypass graft.  Now presenting with right heel blackened area, erythematous streaking up the leg, tenderness, lateral proximal lower leg lesion.  Concern for infection and need for intervention similar " to lower leg on the left.  He also has cough and URI symptoms.  Possible viral illness versus pneumonia that has not resolved.  Patient is tachycardic.  Decreased breath sounds on the right.    IV placed, labs drawn.    Viral swab shows negative influenza, RSV, COVID  Sodium low at 130.  Normal potassium, bicarb, anion gap.  BUN/creatinine 52.4/7, patient on peritoneal dialysis.  Glucose 170  Alk phosphatase 246.  Albumin low at 2.7.  INR 2.44 which is therapeutic  CRP high at 191.74  ESR elevated 86  Lactic acid normal at 1.0  Elevated white blood cell count at 13.8.  Hemoglobin 11.2.  Platelets 480    Patient previously managed on Zosyn for left lower extremity infection and wound.  This was ordered.    I also gave him a DuoNeb and did a chest x-ray.    Chest x-ray shows slight increase in the volume of a right pleural effusion, now described as moderate with associated compressive atelectasis.    Patient noted improvement in some of his breathing.  He coughed up quite a bit of thick sputum.  His oxygen saturations remain within normal limits.  He also however remains very tachycardic.  This may be from the neb versus other cardiac cause.  He has not taken his nightly beta-blocker.  This was ordered.    I spoke with patient's vascular surgeon, Dr. Gray.  He notes that he was trying to get the patient to be seen in a timely manner.  He reviewed the pictures and results and would like the patient transferred to [facility].  I have put in a transfer order and we are awaiting placement.    It was requested that patient have CT angiogram of the abdomen and pelvis with runoff.  This was ordered and discussed with the patient.  He however has declined the CT as he does not want to receive any contrast.  I am going to defer to patient's surgeon to discuss this further with him as to the necessity as patient currently refuses.    I spoke with the hospitalist who accepted the patient for transfer.  EKG was  ordered.  EKG read by machine as atrial fibrillation with a rapid ventricular response.  There is a wavy baseline with no definite P waves in some leads but I am not sure if this is A-fib versus potentially sinus tachycardia with some PACs.  Patient was given a small amount of fluids and the beta-blocker as noted.  Patient noted pain in his lower extremity.  He was given Tylenol and a dose of oral Dilaudid.  Plan for transfer was discussed.  Patient transferred via EMS in stable condition.        Procedures    Results for orders placed or performed during the hospital encounter of 01/21/25 (from the past 24 hours)   CBC with platelets differential    Narrative    The following orders were created for panel order CBC with platelets differential.  Procedure                               Abnormality         Status                     ---------                               -----------         ------                     CBC with platelets and d...[[id number]]  Abnormal            Final result                 Please view results for these tests on the individual orders.   Comprehensive metabolic panel   Result Value Ref Range    Sodium 130 (L) 135 - 145 mmol/L    Potassium 4.3 3.4 - 5.3 mmol/L    Carbon Dioxide (CO2) 25 22 - 29 mmol/L    Anion Gap 14 7 - 15 mmol/L    Urea Nitrogen 52.4 (H) 8.0 - 23.0 mg/dL    Creatinine 7.03 (H) 0.67 - 1.17 mg/dL    GFR Estimate 8 (L) >60 mL/min/1.73m2    Calcium 10.4 8.8 - 10.4 mg/dL    Chloride 91 (L) 98 - 107 mmol/L    Glucose 170 (H) 70 - 99 mg/dL    Alkaline Phosphatase 246 (H) 40 - 150 U/L    AST 17 0 - 45 U/L    ALT 14 0 - 70 U/L    Protein Total 6.9 6.4 - 8.3 g/dL    Albumin 2.7 (L) 3.5 - 5.2 g/dL    Bilirubin Total 0.2 <=1.2 mg/dL   INR   Result Value Ref Range    INR 2.44 (H) 0.85 - 1.15   CRP inflammation   Result Value Ref Range    CRP Inflammation 191.74 (H) <5.00 mg/L   Erythrocyte sedimentation rate auto   Result Value Ref Range    Erythrocyte Sedimentation Rate 86 (H) 0 -  20 mm/hr   Lactic Acid Whole Blood with 1X Repeat in 2 HR when >2   Result Value Ref Range    Lactic Acid, Initial 1.0 0.7 - 2.0 mmol/L   CBC with platelets and differential   Result Value Ref Range    WBC Count 13.8 (H) 4.0 - 11.0 10e3/uL    RBC Count 3.56 (L) 4.40 - 5.90 10e6/uL    Hemoglobin 11.2 (L) 13.3 - 17.7 g/dL    Hematocrit 34.1 (L) 40.0 - 53.0 %    MCV 96 78 - 100 fL    MCH 31.5 26.5 - 33.0 pg    MCHC 32.8 31.5 - 36.5 g/dL    RDW 17.9 (H) 10.0 - 15.0 %    Platelet Count 480 (H) 150 - 450 10e3/uL    % Neutrophils 86 %    % Lymphocytes 6 %    % Monocytes 5 %    % Eosinophils 1 %    % Basophils 0 %    % Immature Granulocytes 1 %    NRBCs per 100 WBC 0 <1 /100    Absolute Neutrophils 11.9 (H) 1.6 - 8.3 10e3/uL    Absolute Lymphocytes 0.9 0.8 - 5.3 10e3/uL    Absolute Monocytes 0.7 0.0 - 1.3 10e3/uL    Absolute Eosinophils 0.2 0.0 - 0.7 10e3/uL    Absolute Basophils 0.0 0.0 - 0.2 10e3/uL    Absolute Immature Granulocytes 0.2 <=0.4 10e3/uL    Absolute NRBCs 0.0 10e3/uL   Influenza A/B, RSV and SARS-CoV2 PCR (COVID-19) Nose    Specimen: Nose; Swab   Result Value Ref Range    Influenza A PCR Negative Negative    Influenza B PCR Negative Negative    RSV PCR Negative Negative    SARS CoV2 PCR Negative Negative    Narrative    Testing was performed using the Xpert Xpress CoV2/Flu/RSV Assay on the [employer] GeneXpert Instrument. This test should be ordered for the detection of SARS-CoV2, influenza, and RSV viruses in individuals with signs and symptoms of respiratory tract infection. This test is for in vitro diagnostic use under the [place] FDA for laboratories certified under CLIA to perform high or moderate complexity testing. This test has been [place] FDA cleared. A negative result does not rule out the presence of PCR inhibitors in the specimen or target RNA in concentration below the limit of detection for the assay. If only one viral target is positive but coinfection with multiple targets is suspected, the sample should  be re-tested with another FDA cleared, approved, or authorized test, if coninfection would change clinical management. This test was validated by the [facility]. These laboratories are certified under the Clinical Laboratory Improvement Amendments of 1988 (CLIA-88) as qualified to perfom high complexity laboratory testing.   XR Chest Port 1 View    Narrative    EXAM: XR CHEST PORT 1 VIEW  LOCATION: [facility]  DATE: 1/21/2025    INDICATION: cough  COMPARISON: Chest CT and radiograph 1/3/2025.      Impression    IMPRESSION: Stable size of cardiomediastinal silhouette. Slight increase in volume of now moderate right pleural effusion with associated compressive atelectasis. Left lung is grossly clear. No pneumothorax. Bones are unchanged.   EKG 12-lead, tracing only   Result Value Ref Range    Systolic Blood Pressure  mmHg    Diastolic Blood Pressure  mmHg    Ventricular Rate 127 BPM    Atrial Rate  BPM    VT Interval  ms    QRS Duration 96 ms     ms    QTc 607 ms    P Axis  degrees    R AXIS -66 degrees    T Axis 81 degrees    Interpretation ECG       Atrial fibrillation with rapid ventricular response  Left anterior fascicular block  Anterior infarct (cited on or before 22-Oct-2024)  Abnormal ECG  When compared with ECG of 03-Jan-2025 20:54,  Atrial fibrillation has replaced Sinus rhythm  Confirmed by SEE ED PROVIDER NOTE FOR, ECG INTERPRETATION ([id number]),  Jhoana Smith ([id number]) on 1/21/2025 7:44:17 PM         Medications   ipratropium - albuterol 0.5 mg/2.5 mg/3 mL (DUONEB) neb solution 3 mL (3 mLs Nebulization $Given 1/21/25 1626)   piperacillin-tazobactam (ZOSYN) 2.25 g vial to attach to  ml bag (0 g Intravenous Stopped 1/21/25 1807)   sodium chloride 0.9% BOLUS 500 mL (0 mLs Intravenous Stopped 1/21/25 1841)   acetaminophen (TYLENOL) tablet 1,000 mg (1,000 mg Oral $Given 1/21/25 1957)   HYDROmorphone (DILAUDID) tablet 2 mg (2 mg Oral $Given 1/21/25  1957)   metoprolol succinate ER (TOPROL XL) 24 hr tablet 25 mg (25 mg Oral $Given 1/21/25 1957)       Assessments & Plan     I have reviewed the findings, diagnosis, plan with the patient.    Discharge Medication List as of 1/21/2025  8:11 PM          Final diagnoses:   Critical limb ischemia of right lower extremity with ulceration of lower leg (H)   Peritoneal dialysis catheter in place   Pleural effusion on right   Tachycardia, unspecified   Productive cough     Disposition: Patient transferred to [facility] via EMS    Note: Chart documentation done in part with Dragon Voice Recognition software. Although reviewed after completion, some word and grammatical errors may remain.   1/21/2025   [facility] EMERGENCY DEPT       Saida Nunez MD  01/21/25 [id number]

## 2025-01-22 NOTE — CONSULTS
[facility]  CONSULTATION NOTE    CHIEF COMPLAINT:      I was asked by Milo Carrion MD  to evaluate this patient for right foot.    PATIENT HISTORY:  Arnulfo Pritchett is a 64 year old male  with a past medical history significant for what's listed below, who was transferred to [facility] for a vascular surgery evaluation. Patient has a large necrotic ulcer to his heel. States is quite painful and difficult to put his sock on and off. States hasn't been draining. Has been black now for several weeks. Denies N/F/V/C/D.         Review of Systems:  A 10 point review of systems was performed and is positive for that noted above in the patient history.  All other areas are negative.     PAST MEDICAL HISTORY:   Past Medical History:   Diagnosis Date    CAD (coronary artery disease)     Chronic systolic heart failure (H)     ESRD (end stage renal disease) on dialysis (H)     Gastroesophageal reflux disease without esophagitis     Gout     HLD (hyperlipidemia)     TALI (obstructive sleep apnea)     PAD (peripheral artery disease)     S/p RLE stenting of SFA/popliteal arteries    Type 2 diabetes mellitus with diabetic neuropathy (H)         PAST SURGICAL HISTORY:   Past Surgical History:   Procedure Laterality Date    AMPUTATE TOE(S) Left 10/27/2024    Procedure: AMPUTATION, TOE left great toe;  Surgeon: Haley Joseph DPM, Podiatry/Foot and Ankle Surgery;  Location: [facility] OR    BYPASS GRAFT FEMOROTIBIAL Left 10/25/2024    Procedure: LEFT FEMORAL ENDARTERECTOMY, LEFT FEMORAL TO TIBIAL PERONEAL TRUNK BYPASS WITH LEFT GREAT SEPHANEOUS VEIN, WOUND VAC PLACEMENT ON LEFT GROIN.;  Surgeon: Raul Gray MD;  Location: [facility] OR    BYPASS GRAFT FEMOROTIBIAL Left 10/27/2024    Procedure: CREATION, BYPASS, VASCULAR, FEMORAL TO TIBIAL REVISION OF BYPASS LEFT COMMON FEMORAL TO TIBIAL.;  Surgeon: Raul Gray MD;  Location: [facility] OR    CV CORONARY ANGIOGRAM N/A 11/27/2024    Procedure: Coronary  Angiogram;  Surgeon: Clem Matt MD;  Location:  [facility]    CV LOWER EXTREMITY ANGIOGRAM LEFT Left 10/27/2024    Procedure: Angiogram Lower Extremity Left;  Surgeon: Raul Gray MD;  Location:  OR    CV PCI N/A 11/27/2024    Procedure: Percutaneous Coronary Intervention;  Surgeon: Clem Matt MD;  Location:  [facility]ARD[facility] CATH LAB    IR LOWER EXTREMITY ANGIOGRAM LEFT  10/17/2024        MEDICATIONS:  Reviewed in Epic. Current.     ALLERGIES:  No Known Allergies     SOCIAL HISTORY:   Social History     Socioeconomic History    Marital status:      Spouse name: Not on file    Number of children: Not on file    Years of education: Not on file    Highest education level: Not on file   Occupational History    Not on file   Tobacco Use    Smoking status: Former     Types: Cigarettes    Smokeless tobacco: Never   Vaping Use    Vaping status: Never Used   Substance and Sexual Activity    Alcohol use: Not Currently     Comment: quit 20 years    Drug use: Never    Sexual activity: Not on file   Other Topics Concern    Not on file   Social History Narrative    Not on file     Social Drivers of Health     Financial Resource Strain: Low Risk  (1/21/2025)    Financial Resource Strain     Within the past 12 months, have you or your family members you live with been unable to get utilities (heat, electricity) when it was really needed?: No   Food Insecurity: Low Risk  (1/21/2025)    Food Insecurity     Within the past 12 months, did you worry that your food would run out before you got money to buy more?: No     Within the past 12 months, did the food you bought just not last and you didn t have money to get more?: No   Transportation Needs: High Risk (1/21/2025)    Transportation Needs     Within the past 12 months, has lack of transportation kept you from medical appointments, getting your medicines, non-medical meetings or appointments, work, or from getting things  that you need?: Yes   Physical Activity: Unknown (10/8/2024)    Exercise Vital Sign     Days of Exercise per Week: 0 days     Minutes of Exercise per Session: Not on file   Stress: No Stress Concern Present (10/8/2024)    [employer] [place] [employer] - Occupational Stress Questionnaire     Feeling of Stress : Only a little   Social Connections: Unknown (10/8/2024)    Social Connection and Isolation Panel [NHANES]     Frequency of Communication with Friends and Family: Not on file     Frequency of Social Gatherings with Friends and Family: Patient declined     Attends Cheondoism Services: Not on file     Active Member of Clubs or Organizations: Not on file     Attends Club or Organization Meetings: Not on file     Marital Status: Not on file   Interpersonal Safety: Low Risk  (1/21/2025)    Interpersonal Safety     Do you feel physically and emotionally safe where you currently live?: Yes     Within the past 12 months, have you been hit, slapped, kicked or otherwise physically hurt by someone?: No     Within the past 12 months, have you been humiliated or emotionally abused in other ways by your partner or ex-partner?: No   Housing Stability: Low Risk  (1/21/2025)    Housing Stability     Do you have housing? : Yes     Are you worried about losing your housing?: No        FAMILY HISTORY: No family history on file.     EXAM:Vitals: /90   Pulse (!) 120   Temp 97.6  F (36.4  C) (Oral)   Resp 16   Wt 82.1 kg (181 lb)   SpO2 96%   BMI 23.88 kg/m    BMI= Body mass index is 23.88 kg/m .    LABS:     Last Comprehensive Metabolic Panel:  Sodium   Date Value Ref Range Status   01/22/2025 131 (L) 135 - 145 mmol/L Final     Potassium   Date Value Ref Range Status   01/22/2025 4.2 3.4 - 5.3 mmol/L Final     Chloride   Date Value Ref Range Status   01/22/2025 93 (L) 98 - 107 mmol/L Final     Carbon Dioxide (CO2)   Date Value Ref Range Status   01/22/2025 25 22 - 29 mmol/L Final     Anion Gap   Date Value Ref  Range Status   01/22/2025 13 7 - 15 mmol/L Final     Glucose   Date Value Ref Range Status   01/22/2025 129 (H) 70 - 99 mg/dL Final     GLUCOSE BY METER POCT   Date Value Ref Range Status   01/22/2025 120 (H) 70 - 99 mg/dL Final     Urea Nitrogen   Date Value Ref Range Status   01/22/2025 54.0 (H) 8.0 - 23.0 mg/dL Final     Creatinine   Date Value Ref Range Status   01/22/2025 7.65 (H) 0.67 - 1.17 mg/dL Final     GFR Estimate   Date Value Ref Range Status   01/22/2025 7 (L) >60 mL/min/1.73m2 Final     Comment:     eGFR calculated using 2021 CKD-EPI equation.     Calcium   Date Value Ref Range Status   01/22/2025 9.8 8.8 - 10.4 mg/dL Final     Comment:     Reference intervals for this test were updated on 7/16/2024 to reflect our healthy population more accurately. There may be differences in the flagging of prior results with similar values performed with this method. Those prior results can be interpreted in the context of the updated reference intervals.     Lab Results   Component Value Date    WBC 13.0 01/22/2025     Lab Results   Component Value Date    RBC 3.49 01/22/2025     Lab Results   Component Value Date    HGB 10.8 01/22/2025     Lab Results   Component Value Date    HCT 33.8 01/22/2025     Lab Results   Component Value Date     01/22/2025      Lab Results   Component Value Date    INR 2.70 01/22/2025    INR 2.44 01/21/2025    INR 4.7 01/14/2025    INR 2.4 01/09/2025    INR 1.6 01/06/2025    INR 1.9 01/02/2025    INR 1.3 12/30/2024    INR 1.0 12/27/2024    INR 1.1 12/26/2024    INR 1.0 12/24/2024    INR 2.28 12/07/2024    INR 2.59 12/06/2024    INR 2.30 12/05/2024    INR 2.29 12/05/2024    INR 2.39 12/03/2024    INR 1.92 12/02/2024    INR 1.87 12/01/2024    INR 1.64 11/30/2024    INR 1.75 11/29/2024    INR 1.76 11/28/2024        General appearance: Patient is alert and fully cooperative with history & exam.  No sign of distress is noted during the visit.      Respiratory: Breathing is regular &  "unlabored while sitting.      HEENT: Hearing is intact to spoken word.  Speech is clear.  No gross evidence of visual impairment that would impact ambulation.      Dermatologic: Right heel: evaluated, with large area of gangrenous/dry tissue to posterior and plantar sites. No drainage from the site. Tender on palpation. Pain from heel to along posterior achilles. No cellulitis.      Vascular: Dorsalis pedis and posterior tibial pulses are difficult to palpate.     Neurologic: Lower extremity sensation is diminished, bilateral foot, to light touch.  No evidence of neurological-based weakness or contracture in the lower extremities.       Musculoskeletal: Patient is ambulatory without an assistive device or brace.  No gross foot or ankle deformity noted.       Psychiatric: Affect is pleasant & appropriate.      All cultures:  No results for input(s): \"CULTURE\" in the last 168 hours.     IMAGING:     Arterial US 1/22:    IMPRESSION:  1.  No blood flow demonstrated in the distal superficial femoral, popliteal, and runoff arteries. There may be trickle blood flow in the distal runoff arteries, though difficult to distinguish from artifact.     2.  Uncertain if stent is in the right lower extremity. Previous angiogram images are not available for review.       ASSESSMENT:  Right heel gangrene   Peripheral Arterial Disease  Diabetes Mellitus --> hba1c: 5.7     MEDICAL DECISION MAKING:   -Discussed all findings with patient. Chart and imaging reviewed.   -Right heel with large amount of necrotic tissue, difficult to determine depth of site, but appears to be fairly deep.   -Plans underway to improve blood flow via angiogram. Per patient, vascular has told him he's not a candidate for a bypass.   -If blood flow can be optimized, will need heel debridement and likely calcaneal resection. Will order MRI to evaluate extent of gangrene and infection. Discussed with patient possibility of needing a below knee amputation, which he " appears aware of.   -Will follow up with patient following MRI.       Thank you for the consultation request and the opportunity to participate in the care of ANGELITA Sorto Department of Podiatry/Foot & Ankle Surgery  Available via [employer] Text

## 2025-01-22 NOTE — PROGRESS NOTES
[facility]  Hospitalist Progress Note        Alejandro Schwarz MD   01/22/2025        Interval History:        - Remains afebrile, mild leukocytosis 13, , ESR 86, normal lactic acid  - evaluated by vascular surgery, RLE duplex obtained (1/22) and note SFA stent is included, CTA ordered and vein mapping , IR angiogram per vascular for possible bypass  - podiatry consulted for evaluation of heel wound  - Vascular reversing warfarin and to initiate Heparin drip when INR <2  - nephrology following to continue peritoneal dialysis         Assessment and Plan:        Arnulfo Pritchett is a 64 year old male with PMH of ESRD (on peritoneal dialysis), diabetes mellitus, GERD, TALI, gout, CAD (h/o multiple stents), chronic paroxysmal A-fib (s/p ablation, on warfarin) , Chronic severe HFrEF, ischemic cardiomyopathy (EF 20-25%),  hyperlipidemia, PAD (s/p left common femoral endarterectomy and a left common femoral artery to tibioperoneal trunk bypass on 25 October 2024 and Follow-up VLADIMIR Doppler on 1/6/2025 noted critical resting arterial insufficiency of the right lower extremity), renal cell carcinoma (s/p nephrectomy) who presented to outside hospital with right leg pain along with shortness of breath or cough and was also found to have right leg ulcer along with right heel ulcer and transferred to [facility] for vascular surgery evaluation on 1/21/2025.     Right leg pain along with concerns of right heel ulcer and ischemic pain  Left lower extremity critical limb ischemia (s/p left common femoral endarterectomy and a left common femoral artery to tibioperoneal trunk bypass on 25 October 2024)  Status ligation of the great saphenous vein on 10/27/2024  Hx of acute RLE arterial occlusion s/p SFA popliteal balloon angioplasty and stenting (5/2024)   Dyslipidemia    - Follows with Dr. Gray and underwent ultrasound VLADIMIR Doppler on 1/6/2025 which was concerning for critical resting arterial  insufficiency in the right lower extremity  - Arterial Doppler left 1/6/2025-patent left common femoral artery to tibioperoneal trunk bypass  - PTA regimen includes Coumadin and Plavix  - INR therapeutic on admission     - Remains afebrile, mild leukocytosis 13, , ESR 86, normal lactic acid  - evaluated by vascular surgery, RLE duplex obtained (1/22) and note SFA stent is included, CTA ordered and vein mapping , IR angiogram per vascular for possible bypass  - podiatry consulted for evaluation of heel wound  - Vascular reversing warfarin and to initiate Heparin drip when INR <2  - will continue with empiric IV Zosyn  -continue PTA Plavix, Lipitor     Cough and shortness of breath with recent pneumonia  Moderate right pleural effusion  acute hypoxic respiratory failure  - On admission presented with shortness of breath and nonproductive cough; was recently diagnosed with pneumonia on 1/3 and completed course of cefdinir and is taking twice daily nebulization treatments  - COVID-19, influenza and RSV is negative  - Chest x-ray was noted slight increase in volume of the now moderate right pleural effusion with associated compressive atelectasis without any pneumothorax  - Encourage incentive spirometer  - empiric Zosyn as above for foot wound  - PRN cough meds  -currently on 1 L nasal cannula oxygen ; will consider thoracentesis if respiratory status worsening  -As needed cough medicine      H/ of chronic paroxysmal A-fib (s/p ablation )  Coronary artery disease status multiple stents  Hyperlipidemia  Chronic severe HFrEF, ischemic cardiomyopathy, not in acute exacerbation (20-25%):   - tachy to 110s--120s  - Continue with PTA metoprolol succinate 25 mg daily   - metoprolol PRN IV for sustained heart rate> 120  - anticoagulation discussion as above     Diabetes mellitus  -PTA on Lantus 35 units daily   -started on lower dose at 15 units daily   -will up titrate as needed   -moderate resistance sliding scale  insulin with hypoglycemia protocol      ESRD on peritoneal dialysis  Secondary hyperparathyroidism  Chronic hyponatremia  - his sodium during the course of recent few months have been fluctuating between around 130s  -- nephrology following to continue peritoneal dialysis  - Continue with PTA sevelamer and Sensipar       Anemia of chronic disease  -His baseline hemoglobin fluctuates between 10.2-11.4  - Hb stable around baseline     GERD  - continue with PTA PPI     Gout  -continue PTA allopurinol      Renal cell carcinoma status post right nephrectomy  - Noted     Obstructive sleep apnea  -Noted and seems intolerant to CPAP    Diet: Regular Diet Adult      DVT Prophylaxis: warfarin/Heparin    Code status: full code    Disposition:   Medically Ready for Discharge: Anticipated in 2-4 Days pending vascular surgery intervention    Care plan discussed with patient and nursing       Clinically Significant Risk Factors Present on Admission         # Hyponatremia: Lowest Na = 130 mmol/L in last 2 days, will monitor as appropriate  # Hypochloremia: Lowest Cl = 91 mmol/L in last 2 days, will monitor as appropriate   # Hypercalcemia: corrected calcium is >10.1, will monitor as appropriate    # Hypoalbuminemia: Lowest albumin = 2.7 g/dL at 1/21/2025  4:06 PM, will monitor as appropriate  # Drug Induced Coagulation Defect: home medication list includes an anticoagulant medication    # Drug Induced Platelet Defect: home medication list includes an antiplatelet medication       # Hypertension: Noted on problem list          # Anemia: based on hgb <11               # Financial/Environmental Concerns:                              Physical Exam:      Blood pressure (!) 122/94, pulse 110, temperature 97.6  F (36.4  C), temperature source Oral, resp. rate 16, weight 82.1 kg (181 lb), SpO2 98%.  Vitals:    01/22/25 0700   Weight: 82.1 kg (181 lb)     Vital Signs with Ranges  Temp:  [97.4  F (36.3  C)-98.3  F (36.8  C)] 97.6  F (36.4   C)  Pulse:  [110-131] 110  Resp:  [16-20] 16  BP: (117-139)/() 122/94  SpO2:  [88 %-99 %] 98 %  I/O's Last 24 hours  I/O last 3 completed shifts:  In: 300 [P.O.:300]  Out: -     Constitutional: Alert, awake and oriented X 3; resting comfortably in no apparent distress       Oral cavity: Moist mucosa   Cardiovascular: Normal s1 s2, regular rate and rhythm, no murmur   Lungs: B/l clear to auscultation, no wheezes or crepitations   Abdomen: Soft, nt, nd, no guarding, rigidity or rebound; BS +   LE : Right foot with gangrenous heel ulcer (reviewed from picture; patient declined exam owing to pain)    Musculoskeletal/Neuro Power 5/5 in all extremities; No focal neurological deficits noted   Psychiatry: normal mood and affect                Medications:        Current Facility-Administered Medications   Medication Dose Route Frequency Provider Last Rate Last Admin    allopurinol (ZYLOPRIM) tablet 200 mg  200 mg Oral QPM Justin Armas MD   200 mg at 01/21/25 2343    atorvastatin (LIPITOR) tablet 40 mg  40 mg Oral At Bedtime Justin Armas MD   40 mg at 01/21/25 2343    calcitRIOL (ROCALTROL) capsule 0.25 mcg  0.25 mcg Oral At Bedtime Justin Armas MD   0.25 mcg at 01/22/25 0005    cinacalcet (SENSIPAR) tablet 60 mg  60 mg Oral Q Mon Wed Fri AM Justin Armas MD        clopidogrel (PLAVIX) tablet 75 mg  75 mg Oral QPM Justin Armas MD   75 mg at 01/21/25 2343    insulin aspart (NovoLOG) injection (RAPID ACTING)  1-7 Units Subcutaneous TID AC Justin Armas MD        insulin aspart (NovoLOG) injection (RAPID ACTING)  1-5 Units Subcutaneous At Bedtime Justin Armas MD        insulin glargine (LANTUS PEN) injection 15 Units  15 Units Subcutaneous At Bedtime Justin Armas MD   15 Units at 01/22/25 0005    metoprolol succinate ER (TOPROL-XL) 24 hr half-tab 25 mg  25 mg Oral Daily Justin Armas MD        multivitamin RENAL (TRIPHROCAPS) capsule 1 capsule  1 capsule Oral Daily Desire Manriquez RPH         pantoprazole (PROTONIX) EC tablet 40 mg  40 mg Oral QAM AC Margaret Kohler, [facility]        piperacillin-tazobactam (ZOSYN) 2.25 g vial to attach to  ml bag  2.25 g Intravenous Q8H Justin Armas MD   2.25 g at 01/22/25 0228    sevelamer carbonate (RENVELA) tablet 800 mg  800 mg Oral TID w/meals Justin Armas MD        sodium chloride (PF) 0.9% PF flush 3 mL  3 mL Intracatheter Q8H Justin Armas MD        Warfarin Dose Required Daily - Pharmacist Managed  1 each Oral See Admin Instructions Justin Armas MD         PRN Meds:   Current Facility-Administered Medications   Medication Dose Route Frequency Provider Last Rate Last Admin    acetaminophen (TYLENOL) tablet 650 mg  650 mg Oral Q4H PRN Justin Armas MD        Or    acetaminophen (TYLENOL) Suppository 650 mg  650 mg Rectal Q4H PRN Justin Armas MD        calcium carbonate (TUMS) chewable tablet 1,000 mg  1,000 mg Oral 4x Daily PRN Justin Armas MD        glucose gel 15-30 g  15-30 g Oral Q15 Min PRN Justin Armas MD        Or    dextrose 50 % injection 25-50 mL  25-50 mL Intravenous Q15 Min PRN Justin Armas MD        Or    glucagon injection 1 mg  1 mg Subcutaneous Q15 Min PRN Justin Armas MD        gentamicin (GARAMYCIN) 0.1 % cream   Topical Daily PRN Justin Armas MD        HYDROmorphone (DILAUDID) injection 0.2 mg  0.2 mg Intravenous Q2H PRN Justin Armas MD        HYDROmorphone (DILAUDID) injection 0.4 mg  0.4 mg Intravenous Q2H PRN Justin Armas MD        lidocaine (LMX4) cream   Topical Q1H PRN Justin Armas MD        lidocaine 1 % 0.1-1 mL  0.1-1 mL Other Q1H PRN Justin Armas MD        naloxone (NARCAN) injection 0.2 mg  0.2 mg Intravenous Q2 Min PRN Blaine Corrales MD        Or    naloxone (NARCAN) injection 0.4 mg  0.4 mg Intravenous Q2 Min PRN Blaine Corrales MD        Or    naloxone (NARCAN) injection 0.2 mg  0.2 mg Intramuscular Q2 Min PRN Blaine Corrales MD        Or    naloxone (NARCAN) injection 0.4 mg  0.4 mg  "Intramuscular Q2 Min PRN Blaine Corrales MD        ondansetron (ZOFRAN ODT) ODT tab 4 mg  4 mg Oral Q6H PRN Justin Armas MD        Or    ondansetron (ZOFRAN) injection 4 mg  4 mg Intravenous Q6H PRN Justin Armas MD        oxyCODONE (ROXICODONE) tablet 5 mg  5 mg Oral Q4H PRN Justin Armas MD        oxyCODONE IR (ROXICODONE) half-tab 2.5 mg  2.5 mg Oral Q4H PRN Justin Armas MD        Patient is already receiving anticoagulation with heparin, enoxaparin (LOVENOX), warfarin (COUMADIN)  or other anticoagulant medication   Does not apply Continuous PRN Justin Armas MD        senna-docusate (SENOKOT-S/PERICOLACE) 8.6-50 MG per tablet 1 tablet  1 tablet Oral BID PRN Justin Armas MD        Or    senna-docusate (SENOKOT-S/PERICOLACE) 8.6-50 MG per tablet 2 tablet  2 tablet Oral BID PRN Justin Armas MD        sodium chloride (PF) 0.9% PF flush 3 mL  3 mL Intracatheter q1 min prn Justin Armas MD                Data:      All new lab and imaging data was reviewed.   Recent Labs   Lab Test 01/22/25  0733 01/21/25  1606 01/14/25  1147 01/06/25  1012 01/03/25  1959   WBC 13.0* 13.8*  --   --  8.5   HGB 10.8* 11.2*  --   --  11.4*   MCV 97 96  --   --  97   * 480*  --   --  323   INR 2.70* 2.44* 4.7*   < >  --     < > = values in this interval not displayed.      Recent Labs   Lab Test 01/22/25  0228 01/21/25  1606 01/03/25  2046 12/27/24  1346   NA  --  130* 132* 134*   POTASSIUM  --  4.3 5.2 4.3   CHLORIDE  --  91* 93* 96*   CO2  --  25 21* 22   BUN  --  52.4* 54.3* 38.8*   CR  --  7.03* 9.06* 7.39*   ANIONGAP  --  14 18* 16*   TERENCE  --  10.4 9.9 9.6   * 170* 139* 193*     No lab results found.    Invalid input(s): \"TROP\", \"TROPONINIES\"      " WDL

## 2025-01-22 NOTE — CONSULTS
Assessment and Plan:     ESRD on PD: runs in a [facility] Unit. Orders placed for 5 XC 2000 ml, 2.5% Dianeal, no last fill. 10 h run, total vol 10,000 ml.   Will review and run daily while inpatient.             Interval History:   RLE pain and ulceration due to PVD. Vascular Surg following.    DM    OA    Gout    ASCVD: S/P PTCA/stents, afib, CHF                     Review of Systems:   C/O pain in RLE. Mild SOB.            Medications:     Current Facility-Administered Medications   Medication Dose Route Frequency Provider Last Rate Last Admin    allopurinol (ZYLOPRIM) tablet 200 mg  200 mg Oral QPM Justin Armas MD   200 mg at 01/21/25 2343    atorvastatin (LIPITOR) tablet 40 mg  40 mg Oral At Bedtime Justin Armas MD   40 mg at 01/21/25 2343    calcitRIOL (ROCALTROL) capsule 0.25 mcg  0.25 mcg Oral At Bedtime Justin Armas MD   0.25 mcg at 01/22/25 0005    cinacalcet (SENSIPAR) tablet 60 mg  60 mg Oral Q Mon Wed Fri AM Justin Armas MD        clopidogrel (PLAVIX) tablet 75 mg  75 mg Oral QPM Justin Armas MD   75 mg at 01/21/25 2343    insulin aspart (NovoLOG) injection (RAPID ACTING)  1-7 Units Subcutaneous TID  Justin Armas MD        insulin aspart (NovoLOG) injection (RAPID ACTING)  1-5 Units Subcutaneous At Bedtime Justin Armas MD        insulin glargine (LANTUS PEN) injection 15 Units  15 Units Subcutaneous At Bedtime Justin Armas MD   15 Units at 01/22/25 0005    metoprolol succinate ER (TOPROL-XL) 24 hr half-tab 25 mg  25 mg Oral Daily Justin Armas MD   25 mg at 01/22/25 0836    multivitamin RENAL (TRIPHROCAPS) capsule 1 capsule  1 capsule Oral Daily Desire Manriquez RPH   1 capsule at 01/22/25 0836    pantoprazole (PROTONIX) EC tablet 40 mg  40 mg Oral QAM AC Margaret Kohler RPH   40 mg at 01/22/25 0836    phytonadione (AQUA-MEPHYTON, VITAMIN K) 2 mg in sodium chloride 0.9 % 50 mL intermittent infusion  2 mg Intravenous Once Dong, Lizett Rock, MD         piperacillin-tazobactam (ZOSYN) 2.25 g vial to attach to  ml bag  2.25 g Intravenous Q8H Justin Armas MD   2.25 g at 25 1007    sevelamer carbonate (RENVELA) tablet 800 mg  800 mg Oral TID w/meals Justin Armas MD        sodium chloride (PF) 0.9% PF flush 3 mL  3 mL Intracatheter Q8H Justin Armas MD        Warfarin Dose Required Daily - Pharmacist Managed  1 each Oral See Admin Instructions Justin Armas MD        warfarin-No DOSE today  1 each Does not apply no dose today (warfarin) Blaine Corrales MD         Current Facility-Administered Medications   Medication Dose Route Frequency Provider Last Rate Last Admin    dianeal PD LOW calcium-2.5% dex (calcium 2.5 mEq/L) 6,000 mL PERITONEAL DIALYSATE   Dialysis [facility] Supplied PD Jimbo Foreman MD        Patient is already receiving anticoagulation with heparin, enoxaparin (LOVENOX), warfarin (COUMADIN)  or other anticoagulant medication   Does not apply Continuous PRN Justin Armas MD         Current active medications and PTA medications reviewed, see medication list for details.            Physical Exam:   Vitals were reviewed  Patient Vitals for the past 24 hrs:   BP Temp Temp src Pulse Resp SpO2 Weight   25 0836 125/90 -- -- (!) 120 -- -- --   25 0700 -- -- -- -- -- -- 82.1 kg (181 lb)   25 0130 (!) 122/94 -- -- 110 -- -- --   25 2341 (!) 127/96 97.6  F (36.4  C) Oral 115 16 98 % --   25 2205 -- -- -- -- -- 95 % --   25 2204 -- -- -- -- -- (!) 88 % --   25 2126 (!) 125/94 97.4  F (36.3  C) Oral 119 16 94 % --       Temp:  [97.4  F (36.3  C)-98.3  F (36.8  C)] 97.6  F (36.4  C)  Pulse:  [110-131] 120  Resp:  [16-20] 16  BP: (117-139)/() 125/90  SpO2:  [88 %-99 %] 98 %    Temperatures:  Current - Temp: 97.6  F (36.4  C); Max - Temp  Av.8  F (36.6  C)  Min: 97.4  F (36.3  C)  Max: 98.3  F (36.8  C)  Respiration range: Resp  Av.3  Min: 16  Max: 20  Pulse range: Pulse  Avg:  121.6  Min: 110  Max: 131  Blood pressure range: Systolic (24hrs), Av , Min:117 , Max:139   ; Diastolic (24hrs), Av, Min:81, Max:104    Pulse oximetry range: SpO2  Av.2 %  Min: 88 %  Max: 99 %    I/O last 3 completed shifts:  In: 300 [P.O.:300]  Out: -       Intake/Output Summary (Last 24 hours) at 2025 1028  Last data filed at 2025 0600  Gross per 24 hour   Intake 300 ml   Output --   Net 300 ml       Alert and responsive, no distress  Marked muscle wasting LE  Lungs with clear ant BS, slightly diminished R base  Cor RRR nl S1 S2 no M or rub  Abd PD cath in LLQ, nl BS, non-tender  LE no edema, muscle wasting       Wt Readings from Last 4 Encounters:   25 82.1 kg (181 lb)   25 75.8 kg (167 lb)   25 78.9 kg (174 lb)   25 78.9 kg (173 lb 14.4 oz)          Data:          Lab Results   Component Value Date     2025     2025     2025    Lab Results   Component Value Date    CHLORIDE 93 2025    CHLORIDE 91 2025    CHLORIDE 93 2025    Lab Results   Component Value Date    BUN 54.0 2025    BUN 52.4 2025    BUN 54.3 2025      Lab Results   Component Value Date    POTASSIUM 4.2 2025    POTASSIUM 4.3 2025    POTASSIUM 5.2 2025    Lab Results   Component Value Date    CO2 25 2025    CO2 25 2025    CO2 21 2025    Lab Results   Component Value Date    CR 7.65 2025    CR 7.03 2025    CR 9.06 2025        Recent Labs   Lab Test 25  0733 25  1606 25   WBC 13.0* 13.8* 8.5   HGB 10.8* 11.2* 11.4*   HCT 33.8* 34.1* 34.4*   MCV 97 96 97   * 480* 323     Recent Labs   Lab Test 25  1606 25  2046 24  1346   AST 17 17 18   ALT 14 14 22   ALKPHOS 246* 246* 292*   BILITOTAL 0.2 0.3 0.3       Recent Labs   Lab Test 25  2046 24  1035   MAG 1.5* 1.8     Recent Labs   Lab Test 24  0718 24  0652  11/07/24  0537   PHOS 5.3* 5.2* 5.2*     Recent Labs   Lab Test 01/22/25  0733 01/21/25  1606 01/03/25 2046   TERENCE 9.8 10.4 9.9       Lab Results   Component Value Date    TERENCE 9.8 01/22/2025     Lab Results   Component Value Date    WBC 13.0 (H) 01/22/2025    HGB 10.8 (L) 01/22/2025    HCT 33.8 (L) 01/22/2025    MCV 97 01/22/2025     (H) 01/22/2025     Lab Results   Component Value Date     (L) 01/22/2025    POTASSIUM 4.2 01/22/2025    CHLORIDE 93 (L) 01/22/2025    CO2 25 01/22/2025     (H) 01/22/2025     Lab Results   Component Value Date    BUN 54.0 (H) 01/22/2025    CR 7.65 (H) 01/22/2025     Lab Results   Component Value Date    MAG 1.5 (L) 01/03/2025     Lab Results   Component Value Date    PHOS 5.3 (H) 11/09/2024       Creatinine   Date Value Ref Range Status   01/22/2025 7.65 (H) 0.67 - 1.17 mg/dL Final   01/21/2025 7.03 (H) 0.67 - 1.17 mg/dL Final   01/03/2025 9.06 (H) 0.67 - 1.17 mg/dL Final   12/27/2024 7.39 (H) 0.67 - 1.17 mg/dL Final   12/06/2024 7.10 (H) 0.67 - 1.17 mg/dL Final   12/05/2024 7.47 (H) 0.67 - 1.17 mg/dL Final       Attestation:  I have reviewed today's vital signs, notes, medications, labs and imaging.  PD orders reviewed , placed and discussed with dialysis nurse.      Jimbo Foreman MD

## 2025-01-22 NOTE — PROGRESS NOTES
ANTICOAGULATION  MANAGEMENT: Discharge Review    Arnulfo Pritchett chart reviewed for anticoagulation continuity of care    ED [facility] transfer to [facility]  on chief complaint of leg pain along with shortness of breath or cough and was also found to have right leg ulcer along with right heel ulcer and transferred to [facility] for vascular surgery evaluation on 1/21/2025 .    Discharge disposition: Transferred hospitals to [facility]    Results:    Recent labs: (last 7 days)     01/21/25  1606 01/22/25  0733   INR 2.44* 2.70*     Anticoagulation inpatient management:     anticoagulation calendar updated with inpatient dosing    Anticoagulation discharge instructions:     Warfarin dosing: home regimen continued   Bridging: No   INR goal change: No      Medication changes affecting anticoagulation: No    Additional factors affecting anticoagulation: Yes: overall healing      PLAN     No adjustment to anticoagulation plan needed    Patient not contacted - [facility] to follow up on discharge from hospital     Anticoagulation Calendar updated     Rafaela Luke, RN  1/22/2025  Anticoagulation Clinic  [facility] for routing messages: merlin ECHEVERRIA  [facility] patient phone line: [phone]

## 2025-01-22 NOTE — CONSULTS
Care Management Initial Consult    General Information  Assessment completed with: Patient,  (Arnulfo)  Type of CM/SW Visit: Initial Assessment    Primary Care Provider verified and updated as needed: Yes   Readmission within the last 30 days: no previous admission in last 30 days      Reason for Consult: discharge planning  Advance Care Planning: Advance Care Planning Reviewed: other (see comments)          Communication Assessment  Patient's communication style: spoken language (English or Bilingual)    Hearing Difficulty or Deaf: yes   Wear Glasses or Blind: yes    Cognitive  Cognitive/Neuro/Behavioral: WDL                      Living Environment:   People in home: alone     Current living Arrangements: apartment      Able to return to prior arrangements: no  Living Arrangement Comments:  (was recently at [facility] and had home care)    Family/Social Support:  Care provided by: self, homecare agency, friend  Provides care for:    Marital Status: Single  Support system: Children, Friend          Description of Support System: Supportive, Involved         Current Resources:   Patient receiving home care services: Yes  Skilled Home Care Services: Skilled Nursing, Home Health Aid     Community Resources:    Equipment currently used at home: walker, rolling  Supplies currently used at home: Diabetic Supplies    Employment/Financial:  Employment Status: disabled        Financial Concerns:     Referral to Financial Worker: No       Does the patient's insurance plan have a 3 day qualifying hospital stay waiver?  No    Lifestyle & Psychosocial Needs:  Social Drivers of Health     Food Insecurity: Low Risk  (1/21/2025)    Food Insecurity     Within the past 12 months, did you worry that your food would run out before you got money to buy more?: No     Within the past 12 months, did the food you bought just not last and you didn t have money to get more?: No   Depression: Not at risk (1/6/2025)    PHQ-2     PHQ-2 Score: 0   Housing  Stability: Low Risk  (1/21/2025)    Housing Stability     Do you have housing? : Yes     Are you worried about losing your housing?: No   Tobacco Use: Medium Risk (1/6/2025)    Patient History     Smoking Tobacco Use: Former     Smokeless Tobacco Use: Never     Passive Exposure: Not on file   Financial Resource Strain: Low Risk  (1/21/2025)    Financial Resource Strain     Within the past 12 months, have you or your family members you live with been unable to get utilities (heat, electricity) when it was really needed?: No   Alcohol Use: Unknown (11/28/2018)    Received [facility]    AUDIT-C     Frequency of Alcohol Consumption: Monthly or less     Average Number of Drinks: Not on file     Frequency of Binge Drinking: Never   Transportation Needs: High Risk (1/21/2025)    Transportation Needs     Within the past 12 months, has lack of transportation kept you from medical appointments, getting your medicines, non-medical meetings or appointments, work, or from getting things that you need?: Yes   Physical Activity: Unknown (10/8/2024)    Exercise Vital Sign     Days of Exercise per Week: 0 days     Minutes of Exercise per Session: Not on file   Interpersonal Safety: Low Risk  (1/21/2025)    Interpersonal Safety     Do you feel physically and emotionally safe where you currently live?: Yes     Within the past 12 months, have you been hit, slapped, kicked or otherwise physically hurt by someone?: No     Within the past 12 months, have you been humiliated or emotionally abused in other ways by your partner or ex-partner?: No   Stress: No Stress Concern Present (10/8/2024)    [employer] - Occupational Stress Questionnaire     Feeling of Stress : Only a little   Social Connections: Unknown (10/8/2024)    Social Connection and Isolation Panel [NHANES]     Frequency of Communication with Friends and Family:  Not on file     Frequency of Social Gatherings with Friends and Family: Patient declined     Attends Buddhism Services: Not on file     Active Member of Clubs or Organizations: Not on file     Attends Club or Organization Meetings: Not on file     Marital Status: Not on file   Health Literacy: Not on file       Functional Status:  Prior to admission patient needed assistance:   Dependent ADLs:: Ambulation-walker, Ambulation-cane  Dependent IADLs:: Transportation       Mental Health Status:          Chemical Dependency Status:                Values/Beliefs:  Spiritual, Cultural Beliefs, Buddhism Practices, Values that affect care:                 Discussed  Partnership in Safe Discharge Planning  document with patient/family: No    Additional Information:  Consult for discharge planning.     64 year old male who Complex medical history which includes ESRD on peritoneal dialysis, diabetes mellitus on insulin, GERD, obstructive sleep apnea, gout, coronary artery disease with multiple stents,chronic paroxysmal A-fib (s/p ablation ) , Chronic severe HFrEF, ischemic cardiomyopathy, not in acute exacerbation (20-25%),  hyperlipidemia, peripheral artery disease status left common femoral endarterectomy and a left common femoral artery to tibioperoneal trunk bypass on 25 October 2024 and Follow-up VLADIMIR Doppler on 1/6/2025 noted critical resting arterial insufficiency of the right lower extremity, renal cell carcinoma status nephrectomy who presented to outside hospital with chief complaint of leg pain along with shortness of breath or cough and was also found to have right leg ulcer along with right heel ulcer and transferred to [facility] for vascular surgery evaluation on 1/21/2025.       Writer reviewed chart and recommendations at discharge. Writer met with patient at bedside and introduced self and role. Writer confirmed patient's primary doctor and home address as accurate. Patient is disabled and on  peritoneal dialysis. Patient's dialysis is managed be [facility]. Patient shares that the 6L PD bags for the cycler are becoming too difficult for him to manage. He has a couple friends that come over to assist with this. He recently had home care started after leaving the [facility] and would prefer NOT to return to [place].      Next Steps: PT/OT to assess     SW/CC follow for discharge planning     KATELYN Duarte

## 2025-01-22 NOTE — PLAN OF CARE
Summary: Transfer from [place] with R lower extremity leg pain w/ R leg and heel ulcer   Behavior & Aggression: Green   Fall Risk: Yes   Orientation: A&Ox4  ABNL VS/O2:VSS on RA. Used 1L NC while sleeping  Labs: MRSA positive and respiratory panel results pending. BG checks 170 and 126   Pain Management:denied need for pain meds. Pain with movement   Bowel/Bladder: on peritoneal dialysis. No BM   Drains: PIV SL. Dialysis port in abd   Skin: R heel/leg ulcer. Scattered scabs and bruising. Blanchable redness to sacrum- mepi over. Old incisions from procedure in October   Diet:NPO since 0000  Activity Level: Ax1 GBW   Tests/Procedures: Vascular and nephrology consult today   Anticipated  DC Date: pending

## 2025-01-23 ENCOUNTER — APPOINTMENT (OUTPATIENT)
Dept: INTERVENTIONAL RADIOLOGY/VASCULAR | Facility: CLINIC | Age: 65
End: 2025-01-23
Attending: STUDENT IN AN ORGANIZED HEALTH CARE EDUCATION/TRAINING PROGRAM
Payer: MEDICARE

## 2025-01-23 ENCOUNTER — APPOINTMENT (OUTPATIENT)
Dept: CT IMAGING | Facility: CLINIC | Age: 65
DRG: 853 | End: 2025-01-23
Attending: INTERNAL MEDICINE
Payer: MEDICARE

## 2025-01-23 VITALS
WEIGHT: 179.23 LBS | BODY MASS INDEX: 23.65 KG/M2 | DIASTOLIC BLOOD PRESSURE: 83 MMHG | TEMPERATURE: 97.4 F | OXYGEN SATURATION: 100 % | HEART RATE: 98 BPM | SYSTOLIC BLOOD PRESSURE: 113 MMHG | RESPIRATION RATE: 20 BRPM

## 2025-01-23 LAB
BACTERIA SPEC CULT: ABNORMAL
BACTERIA SPEC CULT: ABNORMAL
GLUCOSE BLDC GLUCOMTR-MCNC: 161 MG/DL (ref 70–99)
GLUCOSE BLDC GLUCOMTR-MCNC: 203 MG/DL (ref 70–99)
GLUCOSE BLDC GLUCOMTR-MCNC: 78 MG/DL (ref 70–99)
GLUCOSE BLDC GLUCOMTR-MCNC: 95 MG/DL (ref 70–99)
HBV SURFACE AG SERPL QL IA: NONREACTIVE
INR PPP: 1.46 (ref 0.85–1.15)
INR PPP: 1.65 (ref 0.85–1.15)
UFH PPP CHRO-ACNC: 0.22 IU/ML
UFH PPP CHRO-ACNC: <0.1 IU/ML

## 2025-01-23 PROCEDURE — 250N000009 HC RX 250

## 2025-01-23 PROCEDURE — 250N000011 HC RX IP 250 OP 636

## 2025-01-23 PROCEDURE — 75635 CT ANGIO ABDOMINAL ARTERIES: CPT

## 2025-01-23 PROCEDURE — 99152 MOD SED SAME PHYS/QHP 5/>YRS: CPT | Mod: GC | Performed by: SURGERY

## 2025-01-23 PROCEDURE — 85520 HEPARIN ASSAY: CPT | Performed by: STUDENT IN AN ORGANIZED HEALTH CARE EDUCATION/TRAINING PROGRAM

## 2025-01-23 PROCEDURE — 250N000013 HC RX MED GY IP 250 OP 250 PS 637: Performed by: HOSPITALIST

## 2025-01-23 PROCEDURE — 250N000011 HC RX IP 250 OP 636: Performed by: STUDENT IN AN ORGANIZED HEALTH CARE EDUCATION/TRAINING PROGRAM

## 2025-01-23 PROCEDURE — 272N000124 HC CATH CR11

## 2025-01-23 PROCEDURE — 85520 HEPARIN ASSAY: CPT | Performed by: INTERNAL MEDICINE

## 2025-01-23 PROCEDURE — 250N000013 HC RX MED GY IP 250 OP 250 PS 637

## 2025-01-23 PROCEDURE — 99153 MOD SED SAME PHYS/QHP EA: CPT

## 2025-01-23 PROCEDURE — 250N000009 HC RX 250: Performed by: SURGERY

## 2025-01-23 PROCEDURE — 75710 ARTERY X-RAYS ARM/LEG: CPT | Mod: 26 | Performed by: SURGERY

## 2025-01-23 PROCEDURE — 85610 PROTHROMBIN TIME: CPT | Performed by: INTERNAL MEDICINE

## 2025-01-23 PROCEDURE — 36246 INS CATH ABD/L-EXT ART 2ND: CPT | Mod: 79 | Performed by: SURGERY

## 2025-01-23 PROCEDURE — 36415 COLL VENOUS BLD VENIPUNCTURE: CPT | Performed by: STUDENT IN AN ORGANIZED HEALTH CARE EDUCATION/TRAINING PROGRAM

## 2025-01-23 PROCEDURE — 99232 SBSQ HOSP IP/OBS MODERATE 35: CPT | Performed by: HOSPITALIST

## 2025-01-23 PROCEDURE — C1769 GUIDE WIRE: HCPCS

## 2025-01-23 PROCEDURE — 90945 DIALYSIS ONE EVALUATION: CPT | Performed by: INTERNAL MEDICINE

## 2025-01-23 PROCEDURE — 272N000116 HC CATH CR1

## 2025-01-23 PROCEDURE — 250N000011 HC RX IP 250 OP 636: Performed by: HOSPITALIST

## 2025-01-23 PROCEDURE — 120N000001 HC R&B MED SURG/OB

## 2025-01-23 PROCEDURE — 250N000011 HC RX IP 250 OP 636: Performed by: SURGERY

## 2025-01-23 PROCEDURE — 255N000002 HC RX 255 OP 636: Performed by: SURGERY

## 2025-01-23 PROCEDURE — 272N000567 HC SHEATH CR4

## 2025-01-23 PROCEDURE — 258N000003 HC RX IP 258 OP 636

## 2025-01-23 PROCEDURE — 75710 ARTERY X-RAYS ARM/LEG: CPT | Mod: RT

## 2025-01-23 PROCEDURE — 250N000013 HC RX MED GY IP 250 OP 250 PS 637: Performed by: STUDENT IN AN ORGANIZED HEALTH CARE EDUCATION/TRAINING PROGRAM

## 2025-01-23 PROCEDURE — B41F1ZZ FLUOROSCOPY OF RIGHT LOWER EXTREMITY ARTERIES USING LOW OSMOLAR CONTRAST: ICD-10-PCS | Performed by: SURGERY

## 2025-01-23 PROCEDURE — 36415 COLL VENOUS BLD VENIPUNCTURE: CPT | Performed by: INTERNAL MEDICINE

## 2025-01-23 PROCEDURE — 272N000196 HC ACCESSORY CR5

## 2025-01-23 RX ORDER — HEPARIN SODIUM 200 [USP'U]/100ML
1 INJECTION, SOLUTION INTRAVENOUS CONTINUOUS PRN
Status: DISCONTINUED | OUTPATIENT
Start: 2025-01-23 | End: 2025-01-23 | Stop reason: HOSPADM

## 2025-01-23 RX ORDER — LIDOCAINE 40 MG/G
CREAM TOPICAL
Status: CANCELLED | OUTPATIENT
Start: 2025-01-23

## 2025-01-23 RX ORDER — ONDANSETRON 2 MG/ML
4 INJECTION INTRAMUSCULAR; INTRAVENOUS
Status: DISCONTINUED | OUTPATIENT
Start: 2025-01-23 | End: 2025-01-23 | Stop reason: HOSPADM

## 2025-01-23 RX ORDER — NALOXONE HYDROCHLORIDE 0.4 MG/ML
0.2 INJECTION, SOLUTION INTRAMUSCULAR; INTRAVENOUS; SUBCUTANEOUS
Status: DISCONTINUED | OUTPATIENT
Start: 2025-01-23 | End: 2025-01-23 | Stop reason: HOSPADM

## 2025-01-23 RX ORDER — NALOXONE HYDROCHLORIDE 0.4 MG/ML
0.4 INJECTION, SOLUTION INTRAMUSCULAR; INTRAVENOUS; SUBCUTANEOUS
Status: DISCONTINUED | OUTPATIENT
Start: 2025-01-23 | End: 2025-01-23 | Stop reason: HOSPADM

## 2025-01-23 RX ORDER — FENTANYL CITRATE 50 UG/ML
25-50 INJECTION, SOLUTION INTRAMUSCULAR; INTRAVENOUS EVERY 5 MIN PRN
Status: DISCONTINUED | OUTPATIENT
Start: 2025-01-23 | End: 2025-01-23 | Stop reason: HOSPADM

## 2025-01-23 RX ORDER — IODIXANOL 320 MG/ML
100 INJECTION, SOLUTION INTRAVASCULAR ONCE
Status: COMPLETED | OUTPATIENT
Start: 2025-01-23 | End: 2025-01-23

## 2025-01-23 RX ORDER — HEPARIN SODIUM 1000 [USP'U]/ML
3000 INJECTION, SOLUTION INTRAVENOUS; SUBCUTANEOUS ONCE
Status: COMPLETED | OUTPATIENT
Start: 2025-01-23 | End: 2025-01-23

## 2025-01-23 RX ORDER — NITROGLYCERIN 5 MG/ML
100-500 VIAL (ML) INTRAVENOUS EVERY 5 MIN PRN
Status: DISCONTINUED | OUTPATIENT
Start: 2025-01-23 | End: 2025-01-23 | Stop reason: HOSPADM

## 2025-01-23 RX ORDER — IOPAMIDOL 755 MG/ML
100 INJECTION, SOLUTION INTRAVASCULAR ONCE
Status: COMPLETED | OUTPATIENT
Start: 2025-01-23 | End: 2025-01-23

## 2025-01-23 RX ORDER — FLUMAZENIL 0.1 MG/ML
0.2 INJECTION, SOLUTION INTRAVENOUS
Status: DISCONTINUED | OUTPATIENT
Start: 2025-01-23 | End: 2025-01-23 | Stop reason: HOSPADM

## 2025-01-23 RX ADMIN — HEPARIN SODIUM 3 BAG: 200 INJECTION, SOLUTION INTRAVENOUS at 11:35

## 2025-01-23 RX ADMIN — SODIUM CHLORIDE 80 ML: 9 INJECTION, SOLUTION INTRAVENOUS at 09:03

## 2025-01-23 RX ADMIN — Medication: at 11:01

## 2025-01-23 RX ADMIN — SEVELAMER CARBONATE 800 MG: 800 TABLET, FILM COATED ORAL at 13:09

## 2025-01-23 RX ADMIN — PIPERACILLIN AND TAZOBACTAM 2.25 G: 2; .25 INJECTION, POWDER, FOR SOLUTION INTRAVENOUS at 01:16

## 2025-01-23 RX ADMIN — PANTOPRAZOLE SODIUM 40 MG: 20 TABLET, DELAYED RELEASE ORAL at 09:29

## 2025-01-23 RX ADMIN — SEVELAMER CARBONATE 800 MG: 800 TABLET, FILM COATED ORAL at 18:31

## 2025-01-23 RX ADMIN — HEPARIN SODIUM 1150 UNITS/HR: 10000 INJECTION, SOLUTION INTRAVENOUS at 16:28

## 2025-01-23 RX ADMIN — ALLOPURINOL 200 MG: 100 TABLET ORAL at 21:30

## 2025-01-23 RX ADMIN — PIPERACILLIN AND TAZOBACTAM 2.25 G: 2; .25 INJECTION, POWDER, FOR SOLUTION INTRAVENOUS at 10:01

## 2025-01-23 RX ADMIN — ATORVASTATIN CALCIUM 40 MG: 40 TABLET, FILM COATED ORAL at 21:30

## 2025-01-23 RX ADMIN — CALCITRIOL CAPSULES 0.25 MCG 0.25 MCG: 0.25 CAPSULE ORAL at 21:37

## 2025-01-23 RX ADMIN — PIPERACILLIN AND TAZOBACTAM 2.25 G: 2; .25 INJECTION, POWDER, FOR SOLUTION INTRAVENOUS at 18:31

## 2025-01-23 RX ADMIN — NITROGLYCERIN 300 MCG: 10 INJECTION INTRAVENOUS at 11:25

## 2025-01-23 RX ADMIN — METOPROLOL SUCCINATE 25 MG: 25 TABLET, EXTENDED RELEASE ORAL at 09:29

## 2025-01-23 RX ADMIN — FENTANYL CITRATE 25 MCG: 50 INJECTION, SOLUTION INTRAMUSCULAR; INTRAVENOUS at 11:34

## 2025-01-23 RX ADMIN — FENTANYL CITRATE 25 MCG: 50 INJECTION, SOLUTION INTRAMUSCULAR; INTRAVENOUS at 10:58

## 2025-01-23 RX ADMIN — MIDAZOLAM 0.5 MG: 1 INJECTION INTRAMUSCULAR; INTRAVENOUS at 10:50

## 2025-01-23 RX ADMIN — IODIXANOL 50 ML: 320 INJECTION, SOLUTION INTRAVASCULAR at 11:43

## 2025-01-23 RX ADMIN — CLOPIDOGREL BISULFATE 75 MG: 75 TABLET ORAL at 21:30

## 2025-01-23 RX ADMIN — INSULIN GLARGINE 15 UNITS: 100 INJECTION, SOLUTION SUBCUTANEOUS at 21:37

## 2025-01-23 RX ADMIN — IOPAMIDOL 100 ML: 755 INJECTION, SOLUTION INTRAVENOUS at 09:03

## 2025-01-23 RX ADMIN — HEPARIN SODIUM 3000 UNITS: 1000 INJECTION INTRAVENOUS; SUBCUTANEOUS at 11:05

## 2025-01-23 RX ADMIN — FENTANYL CITRATE 25 MCG: 50 INJECTION, SOLUTION INTRAMUSCULAR; INTRAVENOUS at 11:15

## 2025-01-23 RX ADMIN — MIDAZOLAM 0.5 MG: 1 INJECTION INTRAMUSCULAR; INTRAVENOUS at 11:15

## 2025-01-23 RX ADMIN — PHYTONADIONE 2 MG: 2 INJECTION, EMULSION INTRAMUSCULAR; INTRAVENOUS; SUBCUTANEOUS at 09:31

## 2025-01-23 RX ADMIN — Medication 1 CAPSULE: at 13:09

## 2025-01-23 ASSESSMENT — ACTIVITIES OF DAILY LIVING (ADL)
ADLS_ACUITY_SCORE: 56
ADLS_ACUITY_SCORE: 57
ADLS_ACUITY_SCORE: 56

## 2025-01-23 NOTE — PLAN OF CARE
Date/Time: 01/23/25 9027-0710    Summary: Transfer from [place] with R lower extremity leg pain w/ R leg and heel ulcer. Diagnostic angiogram done today 1/23  Behavior & Aggression: Green   Fall Risk: Yes   Orientation: A&Ox4  ABNL VS/O2: VSS on 2L NC  Labs: BS 73, INR 1.65  Pain Management: C/o RLE pain, PRN tylenol and oxycodone given  Bowel/Bladder: On peritoneal dialysis overnight. Little UOP, no BM this shift  IV/Drains: R PIV SL; Heparin infusion paused for 4 hours per order (see mar)  Skin: R heel/leg ulcer. Scattered scabs and bruising. Blanchable redness to sacrum- mepi over. Old incisions from procedure in October. Doppler pulses, RLE cool to touch  Diet: Renal dialysis diet, tolerating   Activity Level: Ax1 GB+W   Tests/Procedures: Pt runs peritoneal dialysis at night - can do this by himself.   Anticipated  DC Date: Pending improvement

## 2025-01-23 NOTE — IR NOTE
Interventional Radiology Intra-procedural Nursing Note    Patient Name: Arnulfo Pritchett  Medical Record Number: [id number]  Today's Date: January 23, 2025    Start Time: 1058  End of procedure time: 1142  Procedure: Consented for RLE ANGIOGRAM WITH RIGHT RADIAL ACCESS   Report given to: General Surgery RN  Time pt departs:  1152    Other Notes: Pt into IR suite 1 via cart. Pt awake and alert. To table in supine position. Monitoring equipment applied. VSS. Tele ST. Dr. Gray and Dr Davis in room. Time out and procedure started. Pt tolerated procedure well. Debrief with Dr. Gray. Diagnostic Right Lower Extremity Angiogram complete, Right TR-Band placed and pressure held until hemostasis achieved. Dressing CDI. No complications. Pt transferred back to General Surgery.    Medications:    Versed 1 mg  Fentanyl 75 mcg  Lidocaine 1% 1 ml  Heparin 6,000 units  Verapamil 2.5 mg  Nitroglycerin 500 mcg    Tayo Foley RN

## 2025-01-23 NOTE — PLAN OF CARE
Date/Time: 01/22-1/23 19:00-07:30    Summary: Transfer from [place] with R lower extremity leg pain w/ R leg and heel ulcer     Behavior & Aggression: Green   Fall Risk: Yes   Orientation: A&Ox4  ABNL VS/O2: VSS on 2L NC  Labs: See chart   Pain Management: C/o RLE pain, PRN tylenol and oxycodone given x1  Bowel/Bladder: On peritoneal dialysis overnight. Little UOP, no BM this shift  IV/Drains: R PIV infusing Heparin @ 1000 units/hr.   Skin: R heel/leg ulcer. Scattered scabs and bruising. Blanchable redness to sacrum- mepi over. Old incisions from procedure in October   Diet: NPO since midnight  Activity Level: Ax1 GB+W   Tests/Procedures: MRI checklist done, unable to do overnight d/t Pt on peritoneal dialysis   Anticipated  DC Date: Pending

## 2025-01-23 NOTE — PROGRESS NOTES
Care Management Follow Up    Length of Stay (days): 2    Expected Discharge Date: 01/24/2025     Concerns to be Addressed: discharge planning     Patient plan of care discussed at interdisciplinary rounds: Yes    Anticipated Discharge Disposition: Home Care     Anticipated Discharge Services:    Anticipated Discharge DME:      Patient/family educated on Medicare website which has current facility and service quality ratings:    Education Provided on the Discharge Plan:    Patient/Family in Agreement with the Plan: yes    Referrals Placed by CM/SW:    Private pay costs discussed: Not applicable    Discussed  Partnership in Safe Discharge Planning  document with patient/family: Yes:     Handoff Completed: Yes, non-[facility] PCP: External handoff communication completed    Additional Information:  Writer secured home care with [facility] was not able to accommodate request - staffing wise     Next Steps: SW/CC follow for discharge planning     KATELYN Duarte

## 2025-01-23 NOTE — PROGRESS NOTES
Renal Medicine       Patient comfortable in bed  Post angiogram  No CP or SOB    BP in range  100% on 2 liter nc    Same CCPD orders tonight  2.5% dextrose         Recent Labs   Lab 01/22/25  0733 01/21/25  1606   POTASSIUM 4.2 4.3           GENEVA Ray    [facility]  [phone]

## 2025-01-23 NOTE — PROGRESS NOTES
Diagnostic angiogram done through right radial access.    CFA disease    SFA occlusion around the stent    BK pop-TPT open, but diseased. AT/peroneal/PT runoff, best with AT to DP.    If BK-pop TPT is clampable, best target for bypass to fill all three branches.    Lizett Davis MD  Vascular Surgery Fellow

## 2025-01-23 NOTE — PRE-PROCEDURE
GENERAL PRE-PROCEDURE:   Procedure:  RLE ANGIOGRAM WITH LEFT RADIAL ACCESS    Written consent obtained?: Yes    Risks and benefits: Risks, benefits and alternatives were discussed    Consent given by:  Patient  Patient states understanding of procedure being performed: Yes    Patient's understanding of procedure matches consent: Yes    Procedure consent matches procedure scheduled: Yes    Expected level of sedation:  Moderate  Appropriately NPO:  Yes  ASA Class:  4  Mallampati  :  Grade 1- soft palate, uvula, tonsillar pillars, and posterior pharyngeal wall visible  Lungs:  Lungs clear with good breath sounds bilaterally  Heart:  Normal heart sounds and rate  History & Physical reviewed:  History and physical reviewed and no updates needed  Statement of review:  I have reviewed the lab findings, diagnostic data, medications, and the plan for sedation

## 2025-01-23 NOTE — PROGRESS NOTES
[facility]  Hospitalist Progress Note        Alejandro Schwarz MD   01/23/2025        Interval History:        - Evaluated by podiatry and suggest: If blood flow can be optimized, will need heel debridement and likely calcaneal resection; getting MRI to evaluate extent of gangrene and infection; podiatry also discussed the possibility of needing a below knee amputation  - vascular surgery planning for Right LE IR angiogram  - Nephrology following and initiated on peritoneal dialysis         Assessment and Plan:        Arnulfo Pritchett is a 64 year old male with PMH of ESRD (on peritoneal dialysis), diabetes mellitus, GERD, TALI, gout, CAD (h/o multiple stents), chronic paroxysmal A-fib (s/p ablation, on warfarin) , Chronic severe HFrEF, ischemic cardiomyopathy (EF 20-25%),  hyperlipidemia, PAD (s/p left common femoral endarterectomy and a left common femoral artery to tibioperoneal trunk bypass on 25 October 2024 and Follow-up VLADIMIR Doppler on 1/6/2025 noted critical resting arterial insufficiency of the right lower extremity), renal cell carcinoma (s/p nephrectomy) who presented to outside hospital with right leg pain along with shortness of breath or cough and was also found to have right leg ulcer along with right heel ulcer and transferred to [facility] for vascular surgery evaluation on 1/21/2025.     Right heel gangrene with heel ulcer and ischemic pain  Left lower extremity critical limb ischemia (s/p left common femoral endarterectomy and a left common femoral artery to tibioperoneal trunk bypass on 25 October 2024)  Status ligation of the great saphenous vein on 10/27/2024  Hx of acute RLE arterial occlusion s/p SFA popliteal balloon angioplasty and stenting (5/2024)   Dyslipidemia    - Follows with Dr. Gray and underwent ultrasound VLADIMIR Doppler on 1/6/2025 which was concerning for critical resting arterial insufficiency in the right lower extremity  - Arterial Doppler left  1/6/2025-patent left common femoral artery to tibioperoneal trunk bypass  - PTA regimen includes Coumadin and Plavix  - INR therapeutic on admission     - Remains afebrile, mild leukocytosis 13, , ESR 86, normal lactic acid  - evaluated by vascular surgery, RLE duplex obtained (1/22) and note SFA stent is included, CTA ordered and vein mapping , IR angiogram per vascular for possible bypass  - Vascular reversed warfarin and initiated Heparin drip when INR <2 (1/22)  - Evaluated by podiatry and suggest: If blood flow can be optimized, will need heel debridement and likely calcaneal resection; getting MRI to evaluate extent of gangrene and infection; podiatry also discussed the possibility of needing a below knee amputation  - will continue with empiric IV Zosyn  - continue PTA Plavix, Lipitor     Cough and shortness of breath with recent pneumonia  Moderate right pleural effusion  acute hypoxic respiratory failure  - On admission presented with shortness of breath and nonproductive cough; was recently diagnosed with pneumonia on 1/3 and completed course of cefdinir and is taking twice daily nebulization treatments  - COVID-19, influenza and RSV is negative  - Chest x-ray was noted slight increase in volume of the now moderate right pleural effusion with associated compressive atelectasis without any pneumothorax  - Encourage incentive spirometer  - empiric Zosyn as above for foot wound  - PRN cough meds  - currently on 1-2 L nasal cannula oxygen ; will consider thoracentesis if respiratory status worsening  - As needed cough medicine      H/ of chronic paroxysmal A-fib (s/p ablation )  Coronary artery disease status multiple stents  Hyperlipidemia  Chronic severe HFrEF, ischemic cardiomyopathy, not in acute exacerbation (20-25%):   - Continue with PTA metoprolol succinate 25 mg daily   - metoprolol PRN IV for sustained heart rate> 120  - anticoagulation discussion as above     Diabetes mellitus  - PTA on  Lantus 35 units daily   - started on lower dose at 15 units daily   - will up titrate as needed pending plans for procedures  - moderate resistance sliding scale insulin with hypoglycemia protocol      ESRD on peritoneal dialysis  Secondary hyperparathyroidism  Chronic hyponatremia  - his sodium during the course of recent few months have been fluctuating between around 130s  - nephrology following and initiated on peritoneal dialysis  - Continue with PTA sevelamer and Sensipar       Anemia of chronic disease  - His baseline hemoglobin fluctuates between 10.2-11.4  - Hb stable around baseline     GERD  - continue with PTA PPI     Gout  -continue PTA allopurinol      Renal cell carcinoma status post right nephrectomy  - Noted     Obstructive sleep apnea  -Noted and seems intolerant to CPAP    Diet: NPO per Anesthesia Guidelines for Procedure/Surgery Except for: Meds      DVT Prophylaxis: warfarin/Heparin    Code status: full code    Disposition:   Medically Ready for Discharge: Anticipated in 2-4 Days pending vascular surgery and podiatry intervention    Care plan discussed with patient and nursing       Clinically Significant Risk Factors Present on Admission         # Hyponatremia: Lowest Na = 130 mmol/L in last 2 days, will monitor as appropriate  # Hypochloremia: Lowest Cl = 91 mmol/L in last 2 days, will monitor as appropriate   # Hypercalcemia: corrected calcium is >10.1, will monitor as appropriate    # Hypoalbuminemia: Lowest albumin = 2.7 g/dL at 1/21/2025  4:06 PM, will monitor as appropriate             # Hypertension: Noted on problem list                        # Financial/Environmental Concerns:                              Physical Exam:      Blood pressure (!) 127/94, pulse 103, temperature 97.9  F (36.6  C), temperature source Oral, resp. rate 18, weight 81.3 kg (179 lb 3.7 oz), SpO2 97%.  Vitals:    01/22/25 0700 01/23/25 0626   Weight: 82.1 kg (181 lb) 81.3 kg (179 lb 3.7 oz)     Vital Signs with  Ranges  Temp:  [97.9  F (36.6  C)-98  F (36.7  C)] 97.9  F (36.6  C)  Pulse:  [] 103  Resp:  [18-20] 18  BP: (105-127)/(76-94) 127/94  SpO2:  [96 %-99 %] 97 %  I/O's Last 24 hours  I/O last 3 completed shifts:  In: 480 [P.O.:480]  Out: 100 [Urine:100]    Constitutional: Alert, awake and oriented; resting comfortably in no apparent distress but appears fatigued       Oral cavity: Moist mucosa   Cardiovascular: Normal s1 s2, regular rate and rhythm, no murmur   Lungs: B/l clear to auscultation, no wheezes or crepitations   Abdomen: Soft, nt, nd, no guarding, rigidity or rebound; BS +   LE : Right foot with gangrenous heel ulcer     Musculoskeletal/Neuro Power 5/5 in all extremities; No focal neurological deficits noted   Psychiatry: normal mood and affect                Medications:        Current Facility-Administered Medications   Medication Dose Route Frequency Provider Last Rate Last Admin    allopurinol (ZYLOPRIM) tablet 200 mg  200 mg Oral QPM Justin Armas MD   200 mg at 01/22/25 2118    atorvastatin (LIPITOR) tablet 40 mg  40 mg Oral At Bedtime Justin Armas MD   40 mg at 01/22/25 2118    calcitRIOL (ROCALTROL) capsule 0.25 mcg  0.25 mcg Oral At Bedtime Justin Armas MD   0.25 mcg at 01/22/25 2118    cinacalcet (SENSIPAR) tablet 60 mg  60 mg Oral Q Mon Wed Fri AM Justin Armas MD   60 mg at 01/22/25 2118    clopidogrel (PLAVIX) tablet 75 mg  75 mg Oral QPM Justin Armas MD   75 mg at 01/22/25 2118    insulin aspart (NovoLOG) injection (RAPID ACTING)  1-7 Units Subcutaneous TID AC Justin Armas MD        insulin aspart (NovoLOG) injection (RAPID ACTING)  1-5 Units Subcutaneous At Bedtime Justin Armas MD        insulin glargine (LANTUS PEN) injection 15 Units  15 Units Subcutaneous At Bedtime Justin Armas MD   15 Units at 01/22/25 2123    metoprolol succinate ER (TOPROL-XL) 24 hr half-tab 25 mg  25 mg Oral Daily Justin Armas MD   25 mg at 01/22/25 9675    multivitamin RENAL (TRIPHROCAPS)  capsule 1 capsule  1 capsule Oral Daily Desire Manriquez RPH   1 capsule at 01/22/25 0836    pantoprazole (PROTONIX) EC tablet 40 mg  40 mg Oral QAM AC Margaret Kohler RPH   40 mg at 01/22/25 0836    phytonadione (AQUA-MEPHYTON, VITAMIN K) 2 mg in sodium chloride 0.9 % 50 mL intermittent infusion  2 mg Intravenous Once Desire Manriquez RPH        piperacillin-tazobactam (ZOSYN) 2.25 g vial to attach to  ml bag  2.25 g Intravenous Q8H Justin Armas MD   2.25 g at 01/23/25 0116    sevelamer carbonate (RENVELA) tablet 800 mg  800 mg Oral TID w/meals Justin Armas MD   800 mg at 01/22/25 1835    sodium chloride (PF) 0.9% PF flush 3 mL  3 mL Intracatheter Q8H Justin Armas MD   3 mL at 01/22/25 2356     PRN Meds:   Current Facility-Administered Medications   Medication Dose Route Frequency Provider Last Rate Last Admin    acetaminophen (TYLENOL) tablet 650 mg  650 mg Oral Q4H PRN Justin Armas MD   650 mg at 01/22/25 2118    Or    acetaminophen (TYLENOL) Suppository 650 mg  650 mg Rectal Q4H PRN Justin Armas MD        calcium carbonate (TUMS) chewable tablet 1,000 mg  1,000 mg Oral 4x Daily PRN Justin Armas MD        glucose gel 15-30 g  15-30 g Oral Q15 Min PRN Justin Armas MD        Or    dextrose 50 % injection 25-50 mL  25-50 mL Intravenous Q15 Min PRN Justin Armas MD        Or    glucagon injection 1 mg  1 mg Subcutaneous Q15 Min PRN Justin Armas MD        dianeal PD LOW calcium-2.5% dex (calcium 2.5 mEq/L) 6,000 mL PERITONEAL DIALYSATE   Dialysis [facility] Supplied PD Jimbo Foreman MD        gentamicin (GARAMYCIN) 0.1 % cream   Topical Daily PRN Jimbo Foreman MD        gentamicin (GARAMYCIN) 0.1 % cream   Topical Daily PRN Justin Armas MD        HYDROmorphone (DILAUDID) injection 0.2 mg  0.2 mg Intravenous Q2H PRN Justin Armas MD        HYDROmorphone (DILAUDID) injection 0.4 mg  0.4 mg Intravenous Q2H PRN Justin Armas MD        lidocaine (LMX4) cream   Topical Q1H  PRN Justin Armas MD        lidocaine 1 % 0.1-1 mL  0.1-1 mL Other Q1H PRN Justin Armas MD        metoprolol (LOPRESSOR) injection 2.5 mg  2.5 mg Intravenous Q4H PRN Alejandro Schwarz MD        midodrine (PROAMATINE) tablet 2.5 mg  2.5 mg Oral Once PRN Alejandro Schwarz MD        naloxone (NARCAN) injection 0.2 mg  0.2 mg Intravenous Q2 Min PRN Blaine Corrales MD        Or    naloxone (NARCAN) injection 0.4 mg  0.4 mg Intravenous Q2 Min PRN Blaine Corrales MD        Or    naloxone (NARCAN) injection 0.2 mg  0.2 mg Intramuscular Q2 Min PRN Blaine Corrales MD        Or    naloxone (NARCAN) injection 0.4 mg  0.4 mg Intramuscular Q2 Min PRN Blaine Corrales MD        ondansetron (ZOFRAN ODT) ODT tab 4 mg  4 mg Oral Q6H PRN Justin Armas MD        Or    ondansetron (ZOFRAN) injection 4 mg  4 mg Intravenous Q6H PRN Justin Armas MD        oxyCODONE (ROXICODONE) tablet 5 mg  5 mg Oral Q4H PRN Justin Armas MD   5 mg at 01/22/25 2123    oxyCODONE IR (ROXICODONE) half-tab 2.5 mg  2.5 mg Oral Q4H PRN Justin Armas MD        Patient is already receiving anticoagulation with heparin, enoxaparin (LOVENOX), warfarin (COUMADIN)  or other anticoagulant medication   Does not apply Continuous PRN Justin Armas MD        senjeremiah-docusate (SENOKOT-S/PERICOLACE) 8.6-50 MG per tablet 1 tablet  1 tablet Oral BID PRN Justin Armas MD        Or    senna-docusate (SENOKOT-S/PERICOLACE) 8.6-50 MG per tablet 2 tablet  2 tablet Oral BID PRN Justin Armas MD        sodium chloride (PF) 0.9% PF flush 3 mL  3 mL Intracatheter q1 min prn Justin Armas MD                Data:      All new lab and imaging data was reviewed.   Recent Labs   Lab Test 01/23/25  0449 01/22/25  2157 01/22/25  1451 01/22/25  0733 01/21/25  1606   WBC  --   --  13.6* 13.0* 13.8*   HGB  --   --  12.2* 10.8* 11.2*   MCV  --   --  96 97 96   PLT  --   --  457* 467* 480*   INR 1.65* 1.78*  --  2.70* 2.44*      Recent Labs   Lab Test  "01/23/25  0206 01/22/25  2127 01/22/25  1745 01/22/25  0952 01/22/25  0733 01/22/25  0228 01/21/25  1606 01/03/25 2046   NA  --   --   --   --  131*  --  130* 132*   POTASSIUM  --   --   --   --  4.2  --  4.3 5.2   CHLORIDE  --   --   --   --  93*  --  91* 93*   CO2  --   --   --   --  25 --  25 21*   BUN  --   --   --   --  54.0*  --  52.4* 54.3*   CR  --   --   --   --  7.65*  --  7.03* 9.06*   ANIONGAP  --   --   --   --  13  --  14 18*   TERENCE  --   --   --   --  9.8  --  10.4 9.9   * 186* 139*   < > 129*   < > 170* 139*    < > = values in this interval not displayed.     No lab results found.    Invalid input(s): \"TROP\", \"TROPONINIES\"      "

## 2025-01-23 NOTE — PROGRESS NOTES
Cycler set up per protocol and per treatment orders     Results from previous treatment:  Effluent color and clarity: yellow/clear  Total UF: 1076  Initial Drain: 780ml  Avg Dwell: 1:20  Lost Dwell: 0:14    Cycler Serial Number: [id number]  Total Treatment Volume: [id number]  Total treatment time: 9 hrs  Fill Volume: 2000ml  Last Fill Volume:0ml  Heater ba.5% 6000mL;  Lot #: [id number];  Expiration Date:   Side Bag #1: 2.5% 6000mL;  Lot #: [id number];  Expiration Date:   Casette: [id number]     Number of cycles including final fill: 5  Dwell Time: 1:22 minutes  Last Fill Volume: 0ml  Initial Drain Alarm: 0ml    PD orders reviewed with Patient procedure and ESRD teaching done and questions answered.  Cycler ready for hook-up.    Report given to: Gelacio Owens RN [facility] consent form signed Yes   Detail Level: Generalized Detail Level: Zone Sunscreen Recommendations: A mineral based sunblock should be applied QAM to all sun exposed skin. Moisturizer Recommendations: A mineral based sunblock should be applied QAM and a gentle moisturizer that is frag free and preferably a cream based product is recommended Cleanser Recommendations: Gentle non-soap cleansers should be utilized and avoid harsh exfoliation. Topical Retinoids Recommendations: Retinoids can possibly flare the condition of rosacea, so it desired, patient is to start with a very mild retinoid and titrate up as tolerated. Detail Level: Detailed

## 2025-01-23 NOTE — PROVIDER NOTIFICATION
Patient is receiving parenteral dialysis overnight. Per RN patient will not be able to go to CT scan until after 0600 hours on 1/23.

## 2025-01-23 NOTE — IR NOTE
Procedure Note for IR Procedure Dictation  Conscious sedation: 1 mg IVP Versed, 75 mcg IVP fentanyl  Sedation time: 44 minutes  Fluoro time: 7.9 minutes  Total Fluoro Dose: 104.23 mGy (Air Kerma)  Contrast: 50 ml Isovue  Local anesthetic: 1 ml 1% lidocaine   Radial Cocktail Heparin 6,000 units, Verapamil 2.5 mg and nitroglycerin 200mcg

## 2025-01-23 NOTE — PLAN OF CARE
Date/Time: 01/22/25 [id number]    Summary: Transfer from [place] with R lower extremity leg pain w/ R leg and heel ulcer   Behavior & Aggression: Green   Fall Risk: Yes   Orientation: A&Ox4  ABNL VS/O2: VSS on 2L NC  Labs: See chart   Pain Management: C/o RLE pain, declines intervention   Bowel/Bladder: On peritoneal dialysis, has a run tonight. No BM   Drains: PIV S w/ int abx. Dialysis port in abd.   Skin: R heel/leg ulcer. Scattered scabs and bruising. Blanchable redness to sacrum- mepi over. Old incisions from procedure in October   Diet: On regular diet, NPO at midnight   Activity Level: Ax1 GBW   Tests/Procedures: MRI checklist done, CT needs to be done but patient has a PD run at 2100 for 9 hours.   Anticipated  DC Date: Pending

## 2025-01-24 ENCOUNTER — APPOINTMENT (OUTPATIENT)
Dept: ULTRASOUND IMAGING | Facility: CLINIC | Age: 65
End: 2025-01-24
Attending: HOSPITALIST
Payer: MEDICARE

## 2025-01-24 DIAGNOSIS — I73.9 PAD (PERIPHERAL ARTERY DISEASE): Primary | ICD-10-CM

## 2025-01-24 LAB
% LINING CELLS, BODY FLUID: 11 %
ANION GAP SERPL CALCULATED.3IONS-SCNC: 14 MMOL/L (ref 7–15)
APPEARANCE FLD: ABNORMAL
BACTERIA SPEC CULT: ABNORMAL
BASOPHILS # BLD AUTO: 0 10E3/UL (ref 0–0.2)
BASOPHILS NFR BLD AUTO: 0 %
BUN SERPL-MCNC: 52.6 MG/DL (ref 8–23)
CALCIUM SERPL-MCNC: 9.3 MG/DL (ref 8.8–10.4)
CHLORIDE SERPL-SCNC: 93 MMOL/L (ref 98–107)
COLOR FLD: YELLOW
CREAT SERPL-MCNC: 7.52 MG/DL (ref 0.67–1.17)
EGFRCR SERPLBLD CKD-EPI 2021: 7 ML/MIN/1.73M2
EOSINOPHIL # BLD AUTO: 0.4 10E3/UL (ref 0–0.7)
EOSINOPHIL NFR BLD AUTO: 3 %
ERYTHROCYTE [DISTWIDTH] IN BLOOD BY AUTOMATED COUNT: 17.8 % (ref 10–15)
GLUCOSE BLDC GLUCOMTR-MCNC: 134 MG/DL (ref 70–99)
GLUCOSE BLDC GLUCOMTR-MCNC: 155 MG/DL (ref 70–99)
GLUCOSE BLDC GLUCOMTR-MCNC: 183 MG/DL (ref 70–99)
GLUCOSE BLDC GLUCOMTR-MCNC: 207 MG/DL (ref 70–99)
GLUCOSE BLDC GLUCOMTR-MCNC: 216 MG/DL (ref 70–99)
GLUCOSE SERPL-MCNC: 257 MG/DL (ref 70–99)
HCO3 SERPL-SCNC: 26 MMOL/L (ref 22–29)
HCT VFR BLD AUTO: 32 % (ref 40–53)
HGB BLD-MCNC: 10.4 G/DL (ref 13.3–17.7)
IMM GRANULOCYTES # BLD: 0.1 10E3/UL
IMM GRANULOCYTES NFR BLD: 1 %
LD BODY BODY FLUID SOURCE: NORMAL
LDH FLD L TO P-CCNC: 91 U/L
LDH SERPL L TO P-CCNC: 147 U/L (ref 0–250)
LYMPHOCYTES # BLD AUTO: 0.9 10E3/UL (ref 0.8–5.3)
LYMPHOCYTES NFR BLD AUTO: 7 %
LYMPHOCYTES NFR FLD MANUAL: 32 %
MCH RBC QN AUTO: 31 PG (ref 26.5–33)
MCHC RBC AUTO-ENTMCNC: 32.5 G/DL (ref 31.5–36.5)
MCV RBC AUTO: 95 FL (ref 78–100)
MONOCYTES # BLD AUTO: 0.6 10E3/UL (ref 0–1.3)
MONOCYTES NFR BLD AUTO: 5 %
MONOS+MACROS NFR FLD MANUAL: 50 %
NEUTROPHILS # BLD AUTO: 10.6 10E3/UL (ref 1.6–8.3)
NEUTROPHILS NFR BLD AUTO: 84 %
NEUTS BAND NFR FLD MANUAL: 7 %
NRBC # BLD AUTO: 0 10E3/UL
NRBC BLD AUTO-RTO: 0 /100
PLATELET # BLD AUTO: 409 10E3/UL (ref 150–450)
POTASSIUM SERPL-SCNC: 4.5 MMOL/L (ref 3.4–5.3)
PROT FLD-MCNC: 1.7 G/DL
PROT SERPL-MCNC: 5.7 G/DL (ref 6.4–8.3)
PROTEIN BODY FLUID SOURCE: NORMAL
RBC # BLD AUTO: 3.36 10E6/UL (ref 4.4–5.9)
SODIUM SERPL-SCNC: 133 MMOL/L (ref 135–145)
SPECIMEN SOURCE FLD: ABNORMAL
UFH PPP CHRO-ACNC: 0.1 IU/ML
UFH PPP CHRO-ACNC: 0.11 IU/ML
UFH PPP CHRO-ACNC: 0.34 IU/ML
WBC # BLD AUTO: 12.6 10E3/UL (ref 4–11)
WBC # FLD AUTO: 117 /UL

## 2025-01-24 PROCEDURE — 250N000011 HC RX IP 250 OP 636: Performed by: STUDENT IN AN ORGANIZED HEALTH CARE EDUCATION/TRAINING PROGRAM

## 2025-01-24 PROCEDURE — 250N000009 HC RX 250: Performed by: RADIOLOGY

## 2025-01-24 PROCEDURE — 36415 COLL VENOUS BLD VENIPUNCTURE: CPT | Performed by: STUDENT IN AN ORGANIZED HEALTH CARE EDUCATION/TRAINING PROGRAM

## 2025-01-24 PROCEDURE — 36415 COLL VENOUS BLD VENIPUNCTURE: CPT | Performed by: INTERNAL MEDICINE

## 2025-01-24 PROCEDURE — 85520 HEPARIN ASSAY: CPT | Performed by: HOSPITALIST

## 2025-01-24 PROCEDURE — 84155 ASSAY OF PROTEIN SERUM: CPT | Performed by: HOSPITALIST

## 2025-01-24 PROCEDURE — 83615 LACTATE (LD) (LDH) ENZYME: CPT | Performed by: HOSPITALIST

## 2025-01-24 PROCEDURE — 0W993ZZ DRAINAGE OF RIGHT PLEURAL CAVITY, PERCUTANEOUS APPROACH: ICD-10-PCS | Performed by: RADIOLOGY

## 2025-01-24 PROCEDURE — 99233 SBSQ HOSP IP/OBS HIGH 50: CPT | Performed by: HOSPITALIST

## 2025-01-24 PROCEDURE — 36415 COLL VENOUS BLD VENIPUNCTURE: CPT | Performed by: HOSPITALIST

## 2025-01-24 PROCEDURE — 87205 SMEAR GRAM STAIN: CPT | Performed by: HOSPITALIST

## 2025-01-24 PROCEDURE — 84157 ASSAY OF PROTEIN OTHER: CPT | Performed by: HOSPITALIST

## 2025-01-24 PROCEDURE — 87070 CULTURE OTHR SPECIMN AEROBIC: CPT | Performed by: HOSPITALIST

## 2025-01-24 PROCEDURE — 89051 BODY FLUID CELL COUNT: CPT | Performed by: HOSPITALIST

## 2025-01-24 PROCEDURE — 250N000013 HC RX MED GY IP 250 OP 250 PS 637: Performed by: STUDENT IN AN ORGANIZED HEALTH CARE EDUCATION/TRAINING PROGRAM

## 2025-01-24 PROCEDURE — 90945 DIALYSIS ONE EVALUATION: CPT

## 2025-01-24 PROCEDURE — 85014 HEMATOCRIT: CPT | Performed by: STUDENT IN AN ORGANIZED HEALTH CARE EDUCATION/TRAINING PROGRAM

## 2025-01-24 PROCEDURE — 90945 DIALYSIS ONE EVALUATION: CPT | Performed by: INTERNAL MEDICINE

## 2025-01-24 PROCEDURE — 250N000012 HC RX MED GY IP 250 OP 636 PS 637: Performed by: STUDENT IN AN ORGANIZED HEALTH CARE EDUCATION/TRAINING PROGRAM

## 2025-01-24 PROCEDURE — 80048 BASIC METABOLIC PNL TOTAL CA: CPT | Performed by: STUDENT IN AN ORGANIZED HEALTH CARE EDUCATION/TRAINING PROGRAM

## 2025-01-24 PROCEDURE — 250N000011 HC RX IP 250 OP 636

## 2025-01-24 PROCEDURE — 82435 ASSAY OF BLOOD CHLORIDE: CPT | Performed by: STUDENT IN AN ORGANIZED HEALTH CARE EDUCATION/TRAINING PROGRAM

## 2025-01-24 PROCEDURE — 120N000001 HC R&B MED SURG/OB

## 2025-01-24 PROCEDURE — 85004 AUTOMATED DIFF WBC COUNT: CPT | Performed by: STUDENT IN AN ORGANIZED HEALTH CARE EDUCATION/TRAINING PROGRAM

## 2025-01-24 PROCEDURE — 272N000706 US THORACENTESIS

## 2025-01-24 PROCEDURE — 89050 BODY FLUID CELL COUNT: CPT | Performed by: HOSPITALIST

## 2025-01-24 PROCEDURE — 85520 HEPARIN ASSAY: CPT | Performed by: INTERNAL MEDICINE

## 2025-01-24 RX ORDER — FUROSEMIDE 10 MG/ML
20 INJECTION INTRAMUSCULAR; INTRAVENOUS ONCE
Status: COMPLETED | OUTPATIENT
Start: 2025-01-24 | End: 2025-01-24

## 2025-01-24 RX ORDER — LIDOCAINE HYDROCHLORIDE 10 MG/ML
10 INJECTION, SOLUTION EPIDURAL; INFILTRATION; INTRACAUDAL; PERINEURAL ONCE
Status: COMPLETED | OUTPATIENT
Start: 2025-01-24 | End: 2025-01-24

## 2025-01-24 RX ADMIN — METOPROLOL SUCCINATE 25 MG: 25 TABLET, EXTENDED RELEASE ORAL at 08:50

## 2025-01-24 RX ADMIN — ALLOPURINOL 200 MG: 100 TABLET ORAL at 21:51

## 2025-01-24 RX ADMIN — PIPERACILLIN AND TAZOBACTAM 2.25 G: 2; .25 INJECTION, POWDER, FOR SOLUTION INTRAVENOUS at 10:48

## 2025-01-24 RX ADMIN — INSULIN GLARGINE 15 UNITS: 100 INJECTION, SOLUTION SUBCUTANEOUS at 21:52

## 2025-01-24 RX ADMIN — PANTOPRAZOLE SODIUM 40 MG: 20 TABLET, DELAYED RELEASE ORAL at 08:51

## 2025-01-24 RX ADMIN — CALCITRIOL CAPSULES 0.25 MCG 0.25 MCG: 0.25 CAPSULE ORAL at 21:51

## 2025-01-24 RX ADMIN — SEVELAMER CARBONATE 800 MG: 800 TABLET, FILM COATED ORAL at 18:17

## 2025-01-24 RX ADMIN — LIDOCAINE HYDROCHLORIDE ANHYDROUS 10 ML: 10 INJECTION, SOLUTION INFILTRATION at 14:20

## 2025-01-24 RX ADMIN — CINACALCET 60 MG: 30 TABLET ORAL at 21:51

## 2025-01-24 RX ADMIN — ATORVASTATIN CALCIUM 40 MG: 40 TABLET, FILM COATED ORAL at 21:51

## 2025-01-24 RX ADMIN — FUROSEMIDE 20 MG: 10 INJECTION, SOLUTION INTRAMUSCULAR; INTRAVENOUS at 08:52

## 2025-01-24 RX ADMIN — PIPERACILLIN AND TAZOBACTAM 2.25 G: 2; .25 INJECTION, POWDER, FOR SOLUTION INTRAVENOUS at 18:17

## 2025-01-24 RX ADMIN — SEVELAMER CARBONATE 800 MG: 800 TABLET, FILM COATED ORAL at 13:22

## 2025-01-24 RX ADMIN — Medication 1 CAPSULE: at 09:00

## 2025-01-24 RX ADMIN — PIPERACILLIN AND TAZOBACTAM 2.25 G: 2; .25 INJECTION, POWDER, FOR SOLUTION INTRAVENOUS at 00:47

## 2025-01-24 RX ADMIN — CLOPIDOGREL BISULFATE 75 MG: 75 TABLET ORAL at 21:51

## 2025-01-24 RX ADMIN — SEVELAMER CARBONATE 800 MG: 800 TABLET, FILM COATED ORAL at 09:00

## 2025-01-24 RX ADMIN — HEPARIN SODIUM 1300 UNITS/HR: 10000 INJECTION, SOLUTION INTRAVENOUS at 15:52

## 2025-01-24 RX ADMIN — INSULIN ASPART 1 UNITS: 100 INJECTION, SOLUTION INTRAVENOUS; SUBCUTANEOUS at 21:52

## 2025-01-24 RX ADMIN — ONDANSETRON 4 MG: 4 TABLET, ORALLY DISINTEGRATING ORAL at 09:03

## 2025-01-24 ASSESSMENT — ACTIVITIES OF DAILY LIVING (ADL)
ADLS_ACUITY_SCORE: 57

## 2025-01-24 NOTE — PROGRESS NOTES
Assessment and Plan:     ESRD on PD: orders placed and discussed with dialysis nurse. Plan 2.5% dianeal, 2000 ml exchange X 5 , no last fill. 9h, toal volume 10 L.     Pt on Sensipar, calcitriol, Renvela.     Date/Time Weight Weight Method   01/23/25 0626 81.3 kg (179 lb 3.7 oz) Bed scale   01/22/25 0700 82.1 kg (181 lb)      Labs with Na 133 > improved, K 4.5, HCO3 26, Cr 7.52.             Interval History:   PVD: Diagnostic angiogram done via right radial access(access site CDI, pulses present, CMS intact). CFA disease with SFA occlusion around stent, BK pop-TPT open, but diseased. AT/peroneal/PT runoff, best with AT to DP. Undergoing MRI to assess for osteomyelitis of the R heel. Plan is to proceed with bypass early next week.     O2 NC.         DM: insulin.       Anemia: Hgb ok at 10.4.            Review of Systems:   C/O discmfort in RLE, not severe pain. On O2, no SOB or CP.           Medications:     Current Facility-Administered Medications   Medication Dose Route Frequency Provider Last Rate Last Admin    allopurinol (ZYLOPRIM) tablet 200 mg  200 mg Oral QPM Lizett Davis MD   200 mg at 01/23/25 2130    atorvastatin (LIPITOR) tablet 40 mg  40 mg Oral At Bedtime Lizett Davis MD   40 mg at 01/23/25 2130    calcitRIOL (ROCALTROL) capsule 0.25 mcg  0.25 mcg Oral At Bedtime Lizett Davis MD   0.25 mcg at 01/23/25 2137    cinacalcet (SENSIPAR) tablet 60 mg  60 mg Oral Q Mon Wed Fri AM Lizett Davis MD   60 mg at 01/22/25 2118    clopidogrel (PLAVIX) tablet 75 mg  75 mg Oral QPM Lizett Davis MD   75 mg at 01/23/25 2130    insulin aspart (NovoLOG) injection (RAPID ACTING)  1-7 Units Subcutaneous TID AC Lizett Davis MD   1 Units at 01/24/25 0839    insulin aspart (NovoLOG) injection (RAPID ACTING)  1-5 Units Subcutaneous At Bedtime Lizett Davis MD        insulin glargine (LANTUS PEN) injection 15 Units  15 Units Subcutaneous At Bedtime Lizett Davis MD   15 Units at 01/23/25 8133    metoprolol  succinate ER (TOPROL-XL) 24 hr half-tab 25 mg  25 mg Oral Daily Lieztt Davis MD   25 mg at 01/24/25 0850    multivitamin RENAL (TRIPHROCAPS) capsule 1 capsule  1 capsule Oral Daily Lizett Davis MD   1 capsule at 01/23/25 1309    pantoprazole (PROTONIX) EC tablet 40 mg  40 mg Oral QAM AC Lizett Davis MD   40 mg at 01/24/25 0851    piperacillin-tazobactam (ZOSYN) 2.25 g vial to attach to  ml bag  2.25 g Intravenous Q8H Lizett Davis MD   2.25 g at 01/24/25 0047    sevelamer carbonate (RENVELA) tablet 800 mg  800 mg Oral TID w/meals Lizett Davis MD   800 mg at 01/23/25 1831    sodium chloride (PF) 0.9% PF flush 3 mL  3 mL Intracatheter Q8H Lizett Davis MD   3 mL at 01/22/25 0326     Current Facility-Administered Medications   Medication Dose Route Frequency Provider Last Rate Last Admin    Continuing statin from home medication list OR statin order already placed during this visit   Does not apply DOES NOT GO TO MAR Lizett Davis MD        dianeal PD LOW calcium-2.5% dex (calcium 2.5 mEq/L) 6,000 mL PERITONEAL DIALYSATE   Dialysis [facility] Supplied PD Lizett Davis MD        heparin 25,000 units in 0.45% NaCl 250 mL ANTICOAGULANT infusion  0-5,000 Units/hr Intravenous Continuous Lizett Davis MD 13 mL/hr at 01/24/25 0509 1,300 Units/hr at 01/24/25 0509    Patient is already receiving anticoagulation with heparin, enoxaparin (LOVENOX), warfarin (COUMADIN)  or other anticoagulant medication   Does not apply Continuous PRN Lizett Davis MD         Current active medications and PTA medications reviewed, see medication list for details.            Physical Exam:   Vitals were reviewed  Patient Vitals for the past 24 hrs:   BP Temp Temp src Pulse Resp SpO2   01/24/25 0847 (!) 131/93 97.6  F (36.4  C) Oral (!) 108 18 100 %   01/24/25 0040 119/84 97.8  F (36.6  C) Oral -- 18 100 %   01/23/25 1541 113/83 97.4  F (36.3  C) Axillary 98 20 100 %   01/23/25 1320 110/79 -- -- 93 20 99 %   01/23/25 1305 112/80 --  -- 60 20 100 %   25 1250 109/77 -- -- 88 20 99 %   25 1232 101/76 -- -- 87 20 100 %   25 1217 102/74 97.5  F (36.4  C) Oral 90 20 98 %   25 1145 110/80 -- -- 89 25 97 %   25 1140 111/82 -- -- 91 21 96 %   25 1135 103/86 -- -- 100 27 99 %   25 1130 107/82 -- -- 90 26 99 %   25 1125 103/81 -- -- 90 29 100 %   25 1120 102/76 -- -- 90 21 99 %   25 1115 104/80 -- -- 90 10 96 %   25 1110 105/84 -- -- 93 24 97 %   25 1105 106/82 -- -- 97 18 100 %   25 1100 (!) 123/100 -- -- 103 18 99 %   25 1055 (!) 119/96 -- -- 98 30 93 %   25 1050 (!) 133/101 -- -- 96 16 99 %       Temp:  [97.4  F (36.3  C)-97.8  F (36.6  C)] 97.6  F (36.4  C)  Pulse:  [] 108  Resp:  [10-30] 18  BP: (101-133)/() 131/93  SpO2:  [93 %-100 %] 100 %    Temperatures:  Current - Temp: 97.6  F (36.4  C); Max - Temp  Av.6  F (36.4  C)  Min: 97.4  F (36.3  C)  Max: 97.8  F (36.6  C)  Respiration range: Resp  Av.1  Min: 10  Max: 30  Pulse range: Pulse  Av.2  Min: 60  Max: 108  Blood pressure range: Systolic (24hrs), Av , Min:101 , Max:133   ; Diastolic (24hrs), Av, Min:74, Max:101    Pulse oximetry range: SpO2  Av.5 %  Min: 93 %  Max: 100 %    I/O last 3 completed shifts:  In: -   Out: 100 [Urine:100]      Intake/Output Summary (Last 24 hours) at 2025 0944  Last data filed at 2025 1614  Gross per 24 hour   Intake --   Output 100 ml   Net -100 ml     Alert, NC O2  Slow speech  Lungs with clear ant BS, frequent coughing  Cor RRR nl S1 S2 no M  LE LLE no edema, RLE tender to palpate         Wt Readings from Last 4 Encounters:   25 81.3 kg (179 lb 3.7 oz)   25 75.8 kg (167 lb)   25 78.9 kg (174 lb)   25 78.9 kg (173 lb 14.4 oz)          Data:          Lab Results   Component Value Date     2025     2025     2025    Lab Results   Component Value Date    CHLORIDE 93  01/24/2025    CHLORIDE 93 01/22/2025    CHLORIDE 91 01/21/2025    Lab Results   Component Value Date    BUN 52.6 01/24/2025    BUN 54.0 01/22/2025    BUN 52.4 01/21/2025      Lab Results   Component Value Date    POTASSIUM 4.5 01/24/2025    POTASSIUM 4.2 01/22/2025    POTASSIUM 4.3 01/21/2025    Lab Results   Component Value Date    CO2 26 01/24/2025    CO2 25 01/22/2025    CO2 25 01/21/2025    Lab Results   Component Value Date    CR 7.52 01/24/2025    CR 7.65 01/22/2025    CR 7.03 01/21/2025        Recent Labs   Lab Test 01/24/25  0425 01/22/25  1451 01/22/25  0733   WBC 12.6* 13.6* 13.0*   HGB 10.4* 12.2* 10.8*   HCT 32.0* 38.0* 33.8*   MCV 95 96 97    457* 467*     Recent Labs   Lab Test 01/21/25  1606 01/03/25 2046 12/27/24  1346   AST 17 17 18   ALT 14 14 22   ALKPHOS 246* 246* 292*   BILITOTAL 0.2 0.3 0.3       Recent Labs   Lab Test 01/03/25 2046 11/27/24  1035   MAG 1.5* 1.8     Recent Labs   Lab Test 11/09/24  0718 11/08/24  0652 11/07/24  0537   PHOS 5.3* 5.2* 5.2*     Recent Labs   Lab Test 01/24/25  0425 01/22/25  0733 01/21/25  1606   TERENCE 9.3 9.8 10.4       Lab Results   Component Value Date    TERENCE 9.3 01/24/2025     Lab Results   Component Value Date    WBC 12.6 (H) 01/24/2025    HGB 10.4 (L) 01/24/2025    HCT 32.0 (L) 01/24/2025    MCV 95 01/24/2025     01/24/2025     Lab Results   Component Value Date     (L) 01/24/2025    POTASSIUM 4.5 01/24/2025    CHLORIDE 93 (L) 01/24/2025    CO2 26 01/24/2025     (H) 01/24/2025     Lab Results   Component Value Date    BUN 52.6 (H) 01/24/2025    CR 7.52 (H) 01/24/2025     Lab Results   Component Value Date    MAG 1.5 (L) 01/03/2025     Lab Results   Component Value Date    PHOS 5.3 (H) 11/09/2024       Creatinine   Date Value Ref Range Status   01/24/2025 7.52 (H) 0.67 - 1.17 mg/dL Final   01/22/2025 7.65 (H) 0.67 - 1.17 mg/dL Final   01/21/2025 7.03 (H) 0.67 - 1.17 mg/dL Final   01/03/2025 9.06 (H) 0.67 - 1.17 mg/dL Final    12/27/2024 7.39 (H) 0.67 - 1.17 mg/dL Final   12/06/2024 7.10 (H) 0.67 - 1.17 mg/dL Final       Attestation:  I have reviewed today's vital signs, notes, medications, labs and imaging.  PD orders reviewed and written.      Jimbo Foreman MD

## 2025-01-24 NOTE — PLAN OF CARE
Date/Time: 01/23-1/24 23:00-07:30    Summary: Transfer from [place] with R lower extremity leg pain w/ R leg and heel ulcer. Diagnostic angiogram done today 1/23    Behavior & Aggression: Green   Fall Risk: Yes   Orientation: A&Ox4  ABNL VS/O2: VSS on 2L NC  Labs: See chart  Pain Management: Denied pain  Bowel/Bladder: On peritoneal dialysis overnight. Little UOP, no BM this shift  IV/Drains: PIV infusing heparin @ 13mL/hr  Skin: R heel/leg ulcer. Scattered scabs and bruising. Blanchable redness to sacrum. Old incisions from procedure in October. Doppler pulses, RLE cool to touch  Diet: Renal dialysis diet, tolerating   Activity Level: Ax1 GB+W, not OOB this shift  Tests/Procedures: Peritoneal dialysis at night for 9 hrs. Pt can do this himself  Anticipated DC Date: Pending

## 2025-01-24 NOTE — PROGRESS NOTES
Cycler set up per protocol and per treatment orders      Results from previous treatment:  Effluent color and clarity: yellow/clear  Total UF: 315  Initial Drain: 83ml  Avg Dwell: 1:20  Lost Dwell: 0:10     Cycler Serial Number: [id number]  Total Treatment Volume: 12035dG  Total treatment time: 9 hrs  Fill Volume: 2000ml  Last Fill Volume:0ml  Heater ba.5% 6000mL;  Lot #: [id number];  Expiration Date:   Side Bag #1: 2.5% 6000mL;  Lot #: [id number];  Expiration Date:   Casette: [id number]      Number of cycles including final fill: 5  Dwell Time: 1:22 minutes  Last Fill Volume: 0ml  Initial Drain Alarm: 0ml     PD orders reviewed with Patient procedure and ESRD teaching done and questions answered.  Cycler ready for hook-up.    Report given to:  YISEL Daniel consent form signed Yes

## 2025-01-24 NOTE — PROGRESS NOTES
"[facility]  Hospitalist Progress Note        Alejandro Schwarz MD   01/24/2025        Interval History:        - No acute issues overnight; reports feeling slightly nauseous this morning and frequently coughing  - s/p IR angiogram 1/23/25; per vascular: note of \"CFA disease, SFA occlusion around the stent,  BK pop-TPT open, but diseased. AT/peroneal/PT runoff, best with AT to DP\"  - vascular surgery plans for bypass in coming days  - MRI right foot not completed yet         Assessment and Plan:        Arnulfo Pritchett is a 64 year old male with PMH of ESRD (on peritoneal dialysis), diabetes mellitus, GERD, TALI, gout, CAD (h/o multiple stents), chronic paroxysmal A-fib (s/p ablation, on warfarin) , Chronic severe HFrEF, ischemic cardiomyopathy (EF 20-25%),  hyperlipidemia, PAD (s/p left common femoral endarterectomy and a left common femoral artery to tibioperoneal trunk bypass on 25 October 2024 and Follow-up VLADIMIR Doppler on 1/6/2025 noted critical resting arterial insufficiency of the right lower extremity), renal cell carcinoma (s/p nephrectomy) who presented to outside hospital with right leg pain along with shortness of breath or cough and was also found to have right leg ulcer along with right heel ulcer and transferred to [facility] for vascular surgery evaluation on 1/21/2025.     Right heel gangrene with heel ulcer and ischemic pain  Occluded right SFA around the stent (IR angiogram 1/23/25)    H/o Left lower extremity critical limb ischemia (s/p left common femoral endarterectomy and a left common femoral artery to tibioperoneal trunk bypass on 25 October 2024)  Status ligation of the great saphenous vein on 10/27/2024  Hx of acute RLE arterial occlusion s/p SFA popliteal balloon angioplasty and stenting (5/2024)   Dyslipidemia    - Follows with Dr. Gray and underwent ultrasound VLADIMIR Doppler on 1/6/2025 which was concerning for critical resting arterial insufficiency in the right " "lower extremity  - Arterial Doppler left 1/6/2025-patent left common femoral artery to tibioperoneal trunk bypass  - PTA regimen includes Coumadin and Plavix  - INR therapeutic on admission     - Remains afebrile, mild leukocytosis 13, , ESR 86, normal lactic acid  - evaluated by vascular surgery, RLE duplex obtained (1/22) and note SFA stent is occluded,   - Vascular reversed warfarin and initiated Heparin drip when INR <2 (1/22)  - s/p IR angiogram 1/23/25; per vascular: note of \"CFA disease, SFA occlusion around the stent,  BK pop-TPT open, but diseased. AT/peroneal/PT runoff, best with AT to DP\"  - vascular surgery plans for bypass in coming days  - Evaluated by podiatry and suggest: If blood flow can be optimized, will need heel debridement and likely calcaneal resection; ordered for MRI to evaluate extent of gangrene and infection; podiatry also discussed the possibility of needing a below knee amputation  - will continue with empiric IV Zosyn  - continue PTA Plavix, Lipitor     Cough and shortness of breath with recent pneumonia  Moderate to large right pleural effusion  Acute hypoxic respiratory failure  - On admission presented with shortness of breath and nonproductive cough; was recently diagnosed with pneumonia on 1/3 and completed course of cefdinir and is taking twice daily nebulization treatments  - COVID-19, influenza and RSV is negative  - Chest x-ray was noted slight increase in volume of the now moderate right pleural effusion with associated compressive atelectasis without any pneumothorax  - Encourage incentive spirometer  - empiric Zosyn as above for foot wound  - PRN cough meds  - currently on 1-2 L nasal cannula oxygen ; CT chest 1/23 noted large right pleural effusion>left  - respiratory status remains stable but will order for thoracentesis  (1/24) in preparartion for the vascular bypass surgery   - As needed cough medicine      H/ of chronic paroxysmal A-fib (s/p ablation " )  Coronary artery disease status multiple stents  Hyperlipidemia  Chronic severe HFrEF, ischemic cardiomyopathy, not in acute exacerbation (20-25%):   - Continue with PTA metoprolol succinate 25 mg daily   - metoprolol PRN IV for sustained heart rate> 120  - anticoagulation discussion as above     Diabetes mellitus  - PTA on Lantus 35 units daily   - started on lower dose at 15 units daily   - will up titrate as needed pending plans for procedures  - moderate resistance sliding scale insulin with hypoglycemia protocol      ESRD on peritoneal dialysis  Secondary hyperparathyroidism  Chronic hyponatremia  - his sodium during the course of recent few months have been fluctuating between around 130s  - nephrology following and initiated on peritoneal dialysis  - Continue with PTA sevelamer and Sensipar       Anemia of chronic disease  - His baseline hemoglobin fluctuates between 10.2-11.4  - Hb stable around baseline     GERD  - continue with PTA PPI     Gout  -continue PTA allopurinol      Renal cell carcinoma status post right nephrectomy  - Noted     Obstructive sleep apnea  -Noted and seems intolerant to CPAP    Diet: Renal Diet (dialysis)      DVT Prophylaxis: warfarin/Heparin    Code status: full code    Disposition:   Medically Ready for Discharge: Anticipated in 2-4 Days pending vascular surgery and podiatry intervention    Care plan discussed with patient and nursing    High medical complexity       Clinically Significant Risk Factors Present on Admission         # Hyponatremia: Lowest Na = 133 mmol/L in last 2 days, will monitor as appropriate  # Hypochloremia: Lowest Cl = 93 mmol/L in last 2 days, will monitor as appropriate      # Hypoalbuminemia: Lowest albumin = 2.7 g/dL at 1/21/2025  4:06 PM, will monitor as appropriate             # Hypertension: Noted on problem list                        # Financial/Environmental Concerns:                              Physical Exam:      Blood pressure 119/84, pulse  98, temperature 97.8  F (36.6  C), temperature source Oral, resp. rate 18, weight 81.3 kg (179 lb 3.7 oz), SpO2 100%.  Vitals:    01/22/25 0700 01/23/25 0626   Weight: 82.1 kg (181 lb) 81.3 kg (179 lb 3.7 oz)     Vital Signs with Ranges  Temp:  [97.4  F (36.3  C)-97.8  F (36.6  C)] 97.8  F (36.6  C)  Pulse:  [] 98  Resp:  [10-30] 18  BP: (101-133)/() 119/84  SpO2:  [93 %-100 %] 100 %  I/O's Last 24 hours  I/O last 3 completed shifts:  In: -   Out: 100 [Urine:100]    Constitutional: Alert, awake and oriented; resting comfortably in no apparent distress but appears fatigued and frail       Oral cavity: Moist mucosa   Cardiovascular: Normal s1 s2, regular rate and rhythm, no murmur   Lungs: Decreased breath sounds right lung; no wheezes or crepitations   Abdomen: Soft, nt, nd, no guarding, rigidity or rebound; BS +   LE : Right foot with gangrenous heel ulcer     Musculoskeletal/Neuro Power 5/5 in all extremities; No focal neurological deficits noted   Psychiatry: normal mood and affect                Medications:        Current Facility-Administered Medications   Medication Dose Route Frequency Provider Last Rate Last Admin    allopurinol (ZYLOPRIM) tablet 200 mg  200 mg Oral QPM Lizett Davis MD   200 mg at 01/23/25 2130    atorvastatin (LIPITOR) tablet 40 mg  40 mg Oral At Bedtime Lizett Davis MD   40 mg at 01/23/25 2130    calcitRIOL (ROCALTROL) capsule 0.25 mcg  0.25 mcg Oral At Bedtime Lizett Davis MD   0.25 mcg at 01/23/25 2137    cinacalcet (SENSIPAR) tablet 60 mg  60 mg Oral Q Mon Wed Fri AM Lizett Davis MD   60 mg at 01/22/25 2118    clopidogrel (PLAVIX) tablet 75 mg  75 mg Oral QPM Lizett Davis MD   75 mg at 01/23/25 2130    furosemide (LASIX) injection 20 mg  20 mg Intravenous Once Milo Carrion MD        insulin aspart (NovoLOG) injection (RAPID ACTING)  1-7 Units Subcutaneous TID AC Lizett Davis MD        insulin aspart (NovoLOG) injection (RAPID ACTING)  1-5 Units Subcutaneous  At Bedtime Lizett Davis MD        insulin glargine (LANTUS PEN) injection 15 Units  15 Units Subcutaneous At Bedtime Lizett Davis MD   15 Units at 01/23/25 2137    metoprolol succinate ER (TOPROL-XL) 24 hr half-tab 25 mg  25 mg Oral Daily Lizett Davis MD   25 mg at 01/23/25 0929    multivitamin RENAL (TRIPHROCAPS) capsule 1 capsule  1 capsule Oral Daily Lizett Davis MD   1 capsule at 01/23/25 1309    pantoprazole (PROTONIX) EC tablet 40 mg  40 mg Oral QAM AC Lizett Davis MD   40 mg at 01/23/25 0929    piperacillin-tazobactam (ZOSYN) 2.25 g vial to attach to  ml bag  2.25 g Intravenous Q8H Lizett Davis MD   2.25 g at 01/24/25 0047    sevelamer carbonate (RENVELA) tablet 800 mg  800 mg Oral TID w/meals Lizett Davis MD   800 mg at 01/23/25 1831    sodium chloride (PF) 0.9% PF flush 3 mL  3 mL Intracatheter Q8H Lizett Davis MD   3 mL at 01/22/25 2356     PRN Meds:   Current Facility-Administered Medications   Medication Dose Route Frequency Provider Last Rate Last Admin    acetaminophen (TYLENOL) tablet 650 mg  650 mg Oral Q4H PRN Lizett Davis MD   650 mg at 01/22/25 2118    Or    acetaminophen (TYLENOL) Suppository 650 mg  650 mg Rectal Q4H PRN Lizett Davis MD        calcium carbonate (TUMS) chewable tablet 1,000 mg  1,000 mg Oral 4x Daily PRN Lizett Davis MD        Continuing statin from home medication list OR statin order already placed during this visit   Does not apply DOES NOT GO TO Lizett Sykes MD        glucose gel 15-30 g  15-30 g Oral Q15 Min PRLizett Paredes MD        Or    dextrose 50 % injection 25-50 mL  25-50 mL Intravenous Q15 Min PRLizett Paredes MD        Or    glucagon injection 1 mg  1 mg Subcutaneous Q15 Min PRLizett Paredes MD        dianeal PD LOW calcium-2.5% dex (calcium 2.5 mEq/L) 6,000 mL PERITONEAL DIALYSATE   Dialysis [facility] Supplied PD Lizett Davis MD        gentamicin (GARAMYCIN) 0.1 % cream   Topical Daily PRN Lizett Davis MD        gentamicin  (GARAMYCIN) 0.1 % cream   Topical Daily PRN Lizett Davis MD        HYDROmorphone (DILAUDID) injection 0.2 mg  0.2 mg Intravenous Q2H PRN Lizett Davis MD        HYDROmorphone (DILAUDID) injection 0.4 mg  0.4 mg Intravenous Q2H PRN Lizett Davis MD        lidocaine (LMX4) cream   Topical Q1H PRN Lizett Davis MD        lidocaine 1 % 0.1-1 mL  0.1-1 mL Other Q1H PRN Lizett Davis MD        metoprolol (LOPRESSOR) injection 2.5 mg  2.5 mg Intravenous Q4H PRN Lizett Davis MD        midodrine (PROAMATINE) tablet 2.5 mg  2.5 mg Oral Once PRN Lizett Davis MD        naloxone (NARCAN) injection 0.2 mg  0.2 mg Intravenous Q2 Min PRN Lizett Davis MD        Or    naloxone (NARCAN) injection 0.4 mg  0.4 mg Intravenous Q2 Min PRN Lizett Davis MD        Or    naloxone (NARCAN) injection 0.2 mg  0.2 mg Intramuscular Q2 Min PRN Lizett Davis MD        Or    naloxone (NARCAN) injection 0.4 mg  0.4 mg Intramuscular Q2 Min PRN Lizett Davis MD        ondansetron (ZOFRAN ODT) ODT tab 4 mg  4 mg Oral Q6H PRN Lizett Davis MD        Or    ondansetron (ZOFRAN) injection 4 mg  4 mg Intravenous Q6H PRN Lizett Davis MD        oxyCODONE (ROXICODONE) tablet 5 mg  5 mg Oral Q4H PRN Lizett Davis MD   5 mg at 01/22/25 2123    oxyCODONE IR (ROXICODONE) half-tab 2.5 mg  2.5 mg Oral Q4H PRN Lizett Davis MD        Patient is already receiving anticoagulation with heparin, enoxaparin (LOVENOX), warfarin (COUMADIN)  or other anticoagulant medication   Does not apply Continuous PRN Lizett Davis MD        senna-docusate (SENOKOT-S/PERICOLACE) 8.6-50 MG per tablet 1 tablet  1 tablet Oral BID PRN Lizett Davis MD        Or    senna-docusate (SENOKOT-S/PERICOLACE) 8.6-50 MG per tablet 2 tablet  2 tablet Oral BID PRLizett Paredes MD        sodium chloride (PF) 0.9% PF flush 3 mL  3 mL Intracatheter q1 min prLizett Paredes MD                Data:      All new lab and imaging data was reviewed.   Recent Labs   Lab Test  "01/24/25  0425 01/23/25  1517 01/23/25  0449 01/22/25  2157 01/22/25  1451 01/22/25  0733   WBC 12.6*  --   --   --  13.6* 13.0*   HGB 10.4*  --   --   --  12.2* 10.8*   MCV 95  --   --   --  96 97     --   --   --  457* 467*   INR  --  1.46* 1.65* 1.78*  --  2.70*      Recent Labs   Lab Test 01/24/25 0425 01/24/25  0115 01/23/25 2158 01/22/25  0952 01/22/25  0733 01/22/25  0228 01/21/25  1606   *  --   --   --  131*  --  130*   POTASSIUM 4.5  --   --   --  4.2  --  4.3   CHLORIDE 93*  --   --   --  93*  --  91*   CO2 26  --   --   --  25 --  25   BUN 52.6*  --   --   --  54.0*  --  52.4*   CR 7.52*  --   --   --  7.65*  --  7.03*   ANIONGAP 14  --   --   --  13  --  14   TERENCE 9.3  --   --   --  9.8  --  10.4   * 207* 161*   < > 129*   < > 170*    < > = values in this interval not displayed.     No lab results found.    Invalid input(s): \"TROP\", \"TROPONINIES\"      "

## 2025-01-24 NOTE — PLAN OF CARE
Goal Outcome Evaluation:         Summary: Transfer from [place] with R lower extremity leg pain w/ R leg and heel ulcer. Diagnostic angiogram done today 1/23  Behavior & Aggression: Green   Fall Risk: Yes   Orientation: A&Ox4  ABNL VS/O2: VSS on 2L NC  Labs: See chart   Pain Management: Denies pain   Bowel/Bladder: On peritoneal dialysis overnight. Little UOP, no BM this shift  IV/Drains: R PIV SL; Heparin infusion @ 1150 units/hr  Skin: R heel/leg ulcer. Scattered scabs and bruising. Blanchable redness to sacrum- mepi over. Old incisions from procedure in October. Doppler pulses, RLE cool to touch  Diet: Renal dialysis diet, tolerating   Activity Level: Ax1 GB+W   Tests/Procedures: Pt runs peritoneal dialysis at night - can do this by himself.   Anticipated  DC Date: Pending improvement

## 2025-01-24 NOTE — PROGRESS NOTES
Vascular Surgery Progress Note    Resting comfortably in bed, no acute events overnight. Requires 1L of oxygen at rest, reports SOB intermittently.     Diagnostic angiogram done via right radial access(access site CDI, pulses present, CMS intact). CFA disease with SFA occlusion around stent, BK pop-TPT open, but diseased. AT/peroneal/PT runoff, best with AT to DP.     Undergoing MRI to assess for osteomyelitis of the R heel, discussed with Arnulfo this can take months to heal, MRI will help further assess salvageability of the foot.     Tentative plan is to proceed with bypass early next week.     Discussed with vascular surgery staff.     Cecilia Stearns, CNP

## 2025-01-24 NOTE — PLAN OF CARE
Date/Time: 01/24/25 [id number]  Summary: Transfer from [place] with R lower extremity leg pain w/ R leg and heel ulcer. Diagnostic angiogram done yesterday 1/23  Behavior & Aggression: Green   Fall Risk: Yes   Orientation: A&Ox4  ABNL VS/O2: VSS on 1-2L NC  Labs: Na 133, Creat 7.52, WBC 12.6, Hgb 10.4, Hep 10a 0.34/  Pain Management: Denies pain  Bowel/Bladder: On peritoneal dialysis overnight. Little UOP, no BM this shift  IV/Drains: PIV infusing heparin @ 13mL/hr  Skin: R heel/leg ulcer. Scattered scabs and bruising. Blanchable redness to coccyx. Old incisions from procedure in October. Doppler pulses, RLE cool to touch (faint on doppler)  Diet: Renal dialysis diet, tolerating. Nausea X1 during shift and zofran given with relief   Activity Level: A+1 gb/w, pivot to cart  Tests/Procedures: Peritoneal dialysis at night - can do this by himself.   Anticipated DC Date: Thoracentesis completed today with 1.5L taken off. Plan for MRI of foot this hospital stay as well as bypass surgery in the future by Vascular. Vascular/Podiatry/Hospitalist/Nephrology/SW following.

## 2025-01-24 NOTE — PROGRESS NOTES
Post Procedural Note    Doing ok after the procedure. Normal cms of the right arm and normal neuro exam. TR band still in place.     Updated the patient on the future plan of fem endart and fem-bk pop vs tibial bypass. Will do own vein mapping and exploration in the OR for suitability but good chance of using an alternative conduit. Patient voiced understanding. Will try to schedule surgery in coming days.     [facility] - vascular

## 2025-01-25 ENCOUNTER — APPOINTMENT (OUTPATIENT)
Dept: MRI IMAGING | Facility: CLINIC | Age: 65
DRG: 853 | End: 2025-01-25
Attending: STUDENT IN AN ORGANIZED HEALTH CARE EDUCATION/TRAINING PROGRAM
Payer: MEDICARE

## 2025-01-25 LAB
ERYTHROCYTE [DISTWIDTH] IN BLOOD BY AUTOMATED COUNT: 17.8 % (ref 10–15)
GLUCOSE BLDC GLUCOMTR-MCNC: 103 MG/DL (ref 70–99)
GLUCOSE BLDC GLUCOMTR-MCNC: 134 MG/DL (ref 70–99)
GLUCOSE BLDC GLUCOMTR-MCNC: 151 MG/DL (ref 70–99)
GLUCOSE BLDC GLUCOMTR-MCNC: 156 MG/DL (ref 70–99)
GLUCOSE BLDC GLUCOMTR-MCNC: 262 MG/DL (ref 70–99)
GLUCOSE BLDC GLUCOMTR-MCNC: 78 MG/DL (ref 70–99)
HCT VFR BLD AUTO: 29.3 % (ref 40–53)
HGB BLD-MCNC: 9.8 G/DL (ref 13.3–17.7)
MCH RBC QN AUTO: 32.2 PG (ref 26.5–33)
MCHC RBC AUTO-ENTMCNC: 33.4 G/DL (ref 31.5–36.5)
MCV RBC AUTO: 96 FL (ref 78–100)
PLATELET # BLD AUTO: 405 10E3/UL (ref 150–450)
RBC # BLD AUTO: 3.04 10E6/UL (ref 4.4–5.9)
UFH PPP CHRO-ACNC: 0.22 IU/ML
UFH PPP CHRO-ACNC: 0.31 IU/ML
WBC # BLD AUTO: 15.2 10E3/UL (ref 4–11)

## 2025-01-25 PROCEDURE — 250N000013 HC RX MED GY IP 250 OP 250 PS 637: Performed by: STUDENT IN AN ORGANIZED HEALTH CARE EDUCATION/TRAINING PROGRAM

## 2025-01-25 PROCEDURE — 73721 MRI JNT OF LWR EXTRE W/O DYE: CPT | Mod: RT

## 2025-01-25 PROCEDURE — 36415 COLL VENOUS BLD VENIPUNCTURE: CPT | Performed by: STUDENT IN AN ORGANIZED HEALTH CARE EDUCATION/TRAINING PROGRAM

## 2025-01-25 PROCEDURE — 120N000001 HC R&B MED SURG/OB

## 2025-01-25 PROCEDURE — 85014 HEMATOCRIT: CPT | Performed by: STUDENT IN AN ORGANIZED HEALTH CARE EDUCATION/TRAINING PROGRAM

## 2025-01-25 PROCEDURE — 250N000011 HC RX IP 250 OP 636: Performed by: STUDENT IN AN ORGANIZED HEALTH CARE EDUCATION/TRAINING PROGRAM

## 2025-01-25 PROCEDURE — 90945 DIALYSIS ONE EVALUATION: CPT | Performed by: INTERNAL MEDICINE

## 2025-01-25 PROCEDURE — 99231 SBSQ HOSP IP/OBS SF/LOW 25: CPT | Mod: 24 | Performed by: SURGERY

## 2025-01-25 PROCEDURE — 85520 HEPARIN ASSAY: CPT | Performed by: HOSPITALIST

## 2025-01-25 PROCEDURE — 36415 COLL VENOUS BLD VENIPUNCTURE: CPT | Performed by: HOSPITALIST

## 2025-01-25 PROCEDURE — 90945 DIALYSIS ONE EVALUATION: CPT

## 2025-01-25 PROCEDURE — 99233 SBSQ HOSP IP/OBS HIGH 50: CPT | Performed by: HOSPITALIST

## 2025-01-25 RX ADMIN — Medication 1 CAPSULE: at 08:56

## 2025-01-25 RX ADMIN — CALCITRIOL CAPSULES 0.25 MCG 0.25 MCG: 0.25 CAPSULE ORAL at 22:19

## 2025-01-25 RX ADMIN — SEVELAMER CARBONATE 800 MG: 800 TABLET, FILM COATED ORAL at 15:11

## 2025-01-25 RX ADMIN — INSULIN ASPART 2 UNITS: 100 INJECTION, SOLUTION INTRAVENOUS; SUBCUTANEOUS at 22:21

## 2025-01-25 RX ADMIN — ATORVASTATIN CALCIUM 40 MG: 40 TABLET, FILM COATED ORAL at 22:19

## 2025-01-25 RX ADMIN — HEPARIN SODIUM 1600 UNITS/HR: 10000 INJECTION, SOLUTION INTRAVENOUS at 08:53

## 2025-01-25 RX ADMIN — CLOPIDOGREL BISULFATE 75 MG: 75 TABLET ORAL at 22:19

## 2025-01-25 RX ADMIN — ALLOPURINOL 200 MG: 100 TABLET ORAL at 22:19

## 2025-01-25 RX ADMIN — PIPERACILLIN AND TAZOBACTAM 2.25 G: 2; .25 INJECTION, POWDER, FOR SOLUTION INTRAVENOUS at 17:26

## 2025-01-25 RX ADMIN — SEVELAMER CARBONATE 800 MG: 800 TABLET, FILM COATED ORAL at 08:56

## 2025-01-25 RX ADMIN — PIPERACILLIN AND TAZOBACTAM 2.25 G: 2; .25 INJECTION, POWDER, FOR SOLUTION INTRAVENOUS at 09:05

## 2025-01-25 RX ADMIN — PIPERACILLIN AND TAZOBACTAM 2.25 G: 2; .25 INJECTION, POWDER, FOR SOLUTION INTRAVENOUS at 00:47

## 2025-01-25 RX ADMIN — INSULIN GLARGINE 15 UNITS: 100 INJECTION, SOLUTION SUBCUTANEOUS at 22:21

## 2025-01-25 RX ADMIN — PANTOPRAZOLE SODIUM 40 MG: 20 TABLET, DELAYED RELEASE ORAL at 08:56

## 2025-01-25 RX ADMIN — SEVELAMER CARBONATE 800 MG: 800 TABLET, FILM COATED ORAL at 18:06

## 2025-01-25 ASSESSMENT — ACTIVITIES OF DAILY LIVING (ADL)
ADLS_ACUITY_SCORE: 57

## 2025-01-25 NOTE — PROGRESS NOTES
Date/Time: 1/24/25 1900 - 2300  Diagnosis: R lower extremity leg pain w/ R leg and heel ulcer. 1/23/25 - Diagnostic angiogram done  Orientation: A&Ox4  Aggression Stop Light: Green  Activity: Ax1 GB/W  Diet/BS Checks: Renal Diet   IV Access/Drains: PIV Infusing Hep at 1450 units/hr   Pain Management: Denies  Abnormal VS/Results: VSS on 1-2L NC   Bowel/Bladder: Peritoneal Dialysis little UOP, no BM this shift  Skin/Wounds:  R heel/leg ulcer. Scattered scabs and bruising. Blanchable redness to sacrum. Old incisions from previous procedure Doppler pulses, RLE cool to touch  Consults: Neph, Vascular, SW  D/C Disposition: Pending  Other Info: MR called and will attempt in AM after Peritoneal Dialysis

## 2025-01-25 NOTE — PLAN OF CARE
Date/Time: 01/25/25 [id number]  Summary: Transfer from [place] with R lower extremity leg pain w/ R leg and heel ulcer. Diagnostic angiogram done 1/23  Behavior & Aggression: Green   Fall Risk: Yes   Orientation: A&Ox4  ABNL VS/O2: VSS on 1-2L NC  Labs: WBC 15.2, Hgb 9.8, Hep10a 0.22 and 0.34 with recheck at 2300  Pain Management: Denies pain  Bowel/Bladder: On peritoneal dialysis overnight. Little UOP, no BM this shift  IV/Drains: PIV infusing heparin @ 16mL/hr  Skin: R heel/leg ulcer. Scattered scabs and bruising. Blanchable redness to coccyx. Old incisions from procedure in October. Doppler pulses, RLE cool to touch (faint on doppler)  Diet: Renal dialysis diet, tolerating.   Activity Level: A+1 gb/w, pivot to cart  Tests/Procedures: Peritoneal dialysis at night - can do this by himself with help from staff   Anticipated DC Date: Thoracentesis completed yesterday with 1.5L taken off. MRI of R foot completed, see results. Plan for surgery on Monday by Vascular. Vascular/Podiatry/Hospitalist/Nephrology/SW following.

## 2025-01-25 NOTE — PROGRESS NOTES
[facility]  Hospitalist Progress Note        Alejandro Schwarz MD   01/25/2025        Interval History:        - s/p right thoracentesis with removal of 1.5 L (1/24); reports feeling much better  - Vascular surgery planning for bypass on 1/27/25  - MRI right ankle (1/25) noted no evidence for osteomyelitis; small tibiotalar joint effusion         Assessment and Plan:        Arnulfo Pritchett is a 64 year old male with PMH of ESRD (on peritoneal dialysis), diabetes mellitus, GERD, TALI, gout, CAD (h/o multiple stents), chronic paroxysmal A-fib (s/p ablation, on warfarin) , Chronic severe HFrEF, ischemic cardiomyopathy (EF 20-25%),  hyperlipidemia, PAD (s/p left common femoral endarterectomy and a left common femoral artery to tibioperoneal trunk bypass on 25 October 2024 and Follow-up VLADIMIR Doppler on 1/6/2025 noted critical resting arterial insufficiency of the right lower extremity), renal cell carcinoma (s/p nephrectomy) who presented to outside hospital with right leg pain along with shortness of breath or cough and was also found to have right leg ulcer along with right heel ulcer and transferred to [facility] for vascular surgery evaluation on 1/21/2025.     Right heel gangrene with heel ulcer and ischemic pain  Occluded right SFA around the stent (IR angiogram 1/23/25)    H/o Left lower extremity critical limb ischemia (s/p left common femoral endarterectomy and a left common femoral artery to tibioperoneal trunk bypass on 25 October 2024)  Status ligation of the great saphenous vein on 10/27/2024  Hx of acute RLE arterial occlusion s/p SFA popliteal balloon angioplasty and stenting (5/2024)   Dyslipidemia    - Follows with Dr. Gray and underwent ultrasound VLADIMIR Doppler on 1/6/2025 which was concerning for critical resting arterial insufficiency in the right lower extremity  - Arterial Doppler left 1/6/2025-patent left common femoral artery to tibioperoneal trunk bypass  - PTA regimen  "includes Coumadin and Plavix  - INR therapeutic on admission     - Remains afebrile, mild leukocytosis 13, , ESR 86, normal lactic acid  - evaluated by vascular surgery, RLE duplex obtained (1/22) and note SFA stent is occluded,   - Vascular reversed warfarin and initiated Heparin drip when INR <2 (1/22)  - s/p IR angiogram 1/23/25; per vascular: note of \"CFA disease, SFA occlusion around the stent,  BK pop-TPT open, but diseased. AT/peroneal/PT runoff, best with AT to DP\"  - vascular surgery plans for bypass on 1/27/25  - Evaluated by podiatry and suggest: If blood flow can be optimized, will need heel debridement and likely calcaneal resection  - MRI right ankle (1/25) noted no evidence for osteomyelitis; small tibiotalar joint effusion  - will continue with empiric IV Zosyn  - continue PTA Plavix, Lipitor     Cough and shortness of breath with recent pneumonia  Moderate to large right pleural effusion, s/p thoracentesis (1/24)  Acute hypoxic respiratory failure  - On admission presented with shortness of breath and nonproductive cough; was recently diagnosed with pneumonia on 1/3 and completed course of cefdinir and is taking twice daily nebulization treatments  - COVID-19, influenza and RSV is negative  - Chest x-ray was noted slight increase in volume of the now moderate right pleural effusion with associated compressive atelectasis without any pneumothorax  - Encourage incentive spirometer  - empiric Zosyn as above for foot wound  - PRN cough meds  - was requiring on 1-2 L nasal cannula oxygen ; CT chest 1/23 noted large right pleural effusion>left  - s/p right thoracentesis with removal of 1.5 L (1/24); respiratory status much improved , oxygen weaned down to room air   - As needed cough medicine      H/ of chronic paroxysmal A-fib (s/p ablation )  Coronary artery disease status multiple stents  Hyperlipidemia  Chronic severe HFrEF, ischemic cardiomyopathy, not in acute exacerbation (20-25%):   - " Continue with PTA metoprolol succinate 25 mg daily   - metoprolol PRN IV for sustained heart rate> 120  - anticoagulation discussion as above     Diabetes mellitus  - PTA on Lantus 35 units daily   - started on lower dose at 15 units daily   - will up titrate as needed pending plans for procedures  - moderate resistance sliding scale insulin with hypoglycemia protocol      ESRD on peritoneal dialysis  Secondary hyperparathyroidism  Chronic hyponatremia  - his sodium during the course of recent few months have been fluctuating between around 130s  - nephrology following and initiated on peritoneal dialysis  - Continue with PTA sevelamer and Sensipar       Anemia of chronic disease  - His baseline hemoglobin fluctuates between 10.2-11.4  - Hb stable around baseline     GERD  - continue with PTA PPI     Gout  -continue PTA allopurinol      Renal cell carcinoma status post right nephrectomy  - Noted     Obstructive sleep apnea  -Noted and seems intolerant to CPAP    Diet: Renal Diet (dialysis)      DVT Prophylaxis: warfarin/Heparin    Code status: full code    Disposition:   Medically Ready for Discharge: Anticipated in 2-4 Days pending vascular surgery and podiatry intervention    Care plan discussed with patient and nursing       Clinically Significant Risk Factors Present on Admission         # Hyponatremia: Lowest Na = 133 mmol/L in last 2 days, will monitor as appropriate  # Hypochloremia: Lowest Cl = 93 mmol/L in last 2 days, will monitor as appropriate      # Hypoalbuminemia: Lowest albumin = 2.7 g/dL at 1/21/2025  4:06 PM, will monitor as appropriate    # Coagulation Defect: INR = 1.46 (Ref range: 0.85 - 1.15) and/or PTT = 32 Seconds (Ref range: 22 - 38 Seconds), will monitor for bleeding          # Hypertension: Noted on problem list                        # Financial/Environmental Concerns:                              Physical Exam:      Blood pressure 102/75, pulse 91, temperature 97.8  F (36.6  C),  temperature source Oral, resp. rate 16, weight 81.3 kg (179 lb 3.7 oz), SpO2 93%.  Vitals:    01/22/25 0700 01/23/25 0626   Weight: 82.1 kg (181 lb) 81.3 kg (179 lb 3.7 oz)     Vital Signs with Ranges  Temp:  [97.8  F (36.6  C)-98.3  F (36.8  C)] 97.8  F (36.6  C)  Pulse:  [91-95] 91  Resp:  [16] 16  BP: (102-124)/(75-92) 102/75  SpO2:  [93 %-97 %] 93 %  I/O's Last 24 hours  I/O last 3 completed shifts:  In: 360 [P.O.:360]  Out: -     Constitutional: Alert, awake and oriented; resting comfortably in no apparent distress       Oral cavity: Moist mucosa   Cardiovascular: Normal s1 s2, regular rate and rhythm, no murmur   Lungs: B/l clear, no wheezes or crepitations   Abdomen: Soft, nt, nd, no guarding, rigidity or rebound; BS +   LE : Right foot with gangrenous heel ulcer     Musculoskeletal/Neuro Power 5/5 in all extremities; No focal neurological deficits noted   Psychiatry: normal mood and affect                Medications:        Current Facility-Administered Medications   Medication Dose Route Frequency Provider Last Rate Last Admin    allopurinol (ZYLOPRIM) tablet 200 mg  200 mg Oral QPM Lizett Davis MD   200 mg at 01/24/25 2151    atorvastatin (LIPITOR) tablet 40 mg  40 mg Oral At Bedtime Lizett Davis MD   40 mg at 01/24/25 2151    calcitRIOL (ROCALTROL) capsule 0.25 mcg  0.25 mcg Oral At Bedtime Lizett Davis MD   0.25 mcg at 01/24/25 2151    cinacalcet (SENSIPAR) tablet 60 mg  60 mg Oral Q Mon Wed Fri AM Lizett Davis MD   60 mg at 01/24/25 2151    clopidogrel (PLAVIX) tablet 75 mg  75 mg Oral QPM Lizett Davis MD   75 mg at 01/24/25 2151    insulin aspart (NovoLOG) injection (RAPID ACTING)  1-7 Units Subcutaneous TID AC Lizett Davis MD   1 Units at 01/24/25 1323    insulin aspart (NovoLOG) injection (RAPID ACTING)  1-5 Units Subcutaneous At Bedtime Lizett Davis MD   1 Units at 01/24/25 2152    insulin glargine (LANTUS PEN) injection 15 Units  15 Units Subcutaneous At Bedtime Lizett Davis MD   15  Units at 01/24/25 2152    metoprolol succinate ER (TOPROL-XL) 24 hr half-tab 25 mg  25 mg Oral Daily Lizett Davis MD   25 mg at 01/24/25 0850    multivitamin RENAL (TRIPHROCAPS) capsule 1 capsule  1 capsule Oral Daily Lizett Davis MD   1 capsule at 01/25/25 0856    pantoprazole (PROTONIX) EC tablet 40 mg  40 mg Oral QAM AC Lizett Davis MD   40 mg at 01/25/25 0856    piperacillin-tazobactam (ZOSYN) 2.25 g vial to attach to  ml bag  2.25 g Intravenous Q8H Lizett Davis  mL/hr at 01/25/25 0905 2.25 g at 01/25/25 0905    sevelamer carbonate (RENVELA) tablet 800 mg  800 mg Oral TID w/meals Lizett Davis MD   800 mg at 01/25/25 0856    sodium chloride (PF) 0.9% PF flush 3 mL  3 mL Intracatheter Q8H Lizett Davis MD   3 mL at 01/22/25 2356     PRN Meds:   Current Facility-Administered Medications   Medication Dose Route Frequency Provider Last Rate Last Admin    acetaminophen (TYLENOL) tablet 650 mg  650 mg Oral Q4H PRN Lizett Davis MD   650 mg at 01/22/25 2118    Or    acetaminophen (TYLENOL) Suppository 650 mg  650 mg Rectal Q4H PRN Lizett Davis MD        calcium carbonate (TUMS) chewable tablet 1,000 mg  1,000 mg Oral 4x Daily PRN Lizett Davis MD        Continuing statin from home medication list OR statin order already placed during this visit   Does not apply DOES NOT GO TO Lizett Sykes MD        glucose gel 15-30 g  15-30 g Oral Q15 Min PRN Lizett Davis MD        Or    dextrose 50 % injection 25-50 mL  25-50 mL Intravenous Q15 Min PRLizett Paredes MD        Or    glucagon injection 1 mg  1 mg Subcutaneous Q15 Min PRLizett Paredes MD        dianeal PD LOW calcium-2.5% dex (calcium 2.5 mEq/L) 6,000 mL PERITONEAL DIALYSATE   Dialysis [facility] Supplied PD Lizett Davis MD        gentamicin (GARAMYCIN) 0.1 % cream   Topical Daily PRN Lizett Davis MD        gentamicin (GARAMYCIN) 0.1 % cream   Topical Daily PRLizett Paredes MD        HYDROmorphone (DILAUDID) injection 0.2 mg  0.2  mg Intravenous Q2H PRN Lizett Davis MD        HYDROmorphone (DILAUDID) injection 0.4 mg  0.4 mg Intravenous Q2H PRN Lizett Davis MD        lidocaine (LMX4) cream   Topical Q1H PRN Lizett Davis MD        lidocaine 1 % 0.1-1 mL  0.1-1 mL Other Q1H PRN Lizett Davis MD        metoprolol (LOPRESSOR) injection 2.5 mg  2.5 mg Intravenous Q4H PRN Lizett Davis MD        midodrine (PROAMATINE) tablet 2.5 mg  2.5 mg Oral Once PRN Lizett Davis MD        naloxone (NARCAN) injection 0.2 mg  0.2 mg Intravenous Q2 Min PRN Lizett Davis MD        Or    naloxone (NARCAN) injection 0.4 mg  0.4 mg Intravenous Q2 Min PRN Lizett Davis MD        Or    naloxone (NARCAN) injection 0.2 mg  0.2 mg Intramuscular Q2 Min PRN Lizett Davis MD        Or    naloxone (NARCAN) injection 0.4 mg  0.4 mg Intramuscular Q2 Min PRN Lizett Davis MD        ondansetron (ZOFRAN ODT) ODT tab 4 mg  4 mg Oral Q6H PRN Lizett Davis MD   4 mg at 01/24/25 0903    Or    ondansetron (ZOFRAN) injection 4 mg  4 mg Intravenous Q6H PRLizett Paredes MD        oxyCODONE (ROXICODONE) tablet 5 mg  5 mg Oral Q4H PRN Lizett Davis MD   5 mg at 01/22/25 2123    oxyCODONE IR (ROXICODONE) half-tab 2.5 mg  2.5 mg Oral Q4H PRN Liztet Davis MD        Patient is already receiving anticoagulation with heparin, enoxaparin (LOVENOX), warfarin (COUMADIN)  or other anticoagulant medication   Does not apply Continuous PRN Lizett Davis MD        senna-docusate (SENOKOT-S/PERICOLACE) 8.6-50 MG per tablet 1 tablet  1 tablet Oral BID PRN Lizett Davis MD        Or    senna-docusate (SENOKOT-S/PERICOLACE) 8.6-50 MG per tablet 2 tablet  2 tablet Oral BID PRLizett Paredes MD        sodium chloride (PF) 0.9% PF flush 3 mL  3 mL Intracatheter q1 min prLizett Paredes MD                Data:      All new lab and imaging data was reviewed.   Recent Labs   Lab Test 01/25/25  0735 01/24/25  0425 01/23/25  1517 01/23/25  0449 01/22/25  2157 01/22/25  1451   WBC 15.2* 12.6*   "--   --   --  13.6*   HGB 9.8* 10.4*  --   --   --  12.2*   MCV 96 95  --   --   --  96    409  --   --   --  457*   INR  --   --  1.46* 1.65* 1.78*  --       Recent Labs   Lab Test 01/25/25  0823 01/25/25  0126 01/24/25  2138 01/24/25  0838 01/24/25  0425 01/22/25  0952 01/22/25  0733 01/22/25  0228 01/21/25  1606   NA  --   --   --   --  133*  --  131*  --  130*   POTASSIUM  --   --   --   --  4.5  --  4.2  --  4.3   CHLORIDE  --   --   --   --  93*  --  93*  --  91*   CO2  --   --   --   --  26  --  25  --  25   BUN  --   --   --   --  52.6*  --  54.0*  --  52.4*   CR  --   --   --   --  7.52*  --  7.65*  --  7.03*   ANIONGAP  --   --   --   --  14  --  13  --  14   TERENCE  --   --   --   --  9.3  --  9.8  --  10.4   GLC 78 156* 216*   < > 257*   < > 129*   < > 170*    < > = values in this interval not displayed.     No lab results found.    Invalid input(s): \"TROP\", \"TROPONINIES\"      "

## 2025-01-25 NOTE — PROGRESS NOTES
VASCULAR SURGERY    Comfortable through night.  Using cycler for peritoneal dialysis.  No issues with the left leg.    Right leg is unchanged with dry necrosis ulcer to right lateral hindfoot.      Reviewed angiograms again.  Still questionable runoff.  Plan to take the patient the operating room on 1/27/2025.  Will plan interoperative angiogram to help determine the runoff and a bypass from the common femoral artery to either the below-knee popliteal artery or anterior tibial artery.  Will likely use PTFE graft.  Also plan foot debridement.    Situation discussed with patient.  All questions answered.        Patrick Hein MD

## 2025-01-25 NOTE — PROGRESS NOTES
Cycler set up per protocol and treatment orders    Effluent color and clarirty:  yellow and clear, no fibrin noted    Total UF:  92 ml  Initial Drain:  89 ml;  Avg Dwell:  1:19  Lost Dwell:  0:18    Cycler Serial Number:  [id number]  Total treatment volume:  [id number]  Total treatment time:  10 hrs  Fill Volume:  2000ml  Last Fill volume:  0 ml  Heater ba.5% 6000 ml, Lot:  [id number], exp: 2026  Side ba.5% 6000 ml, Lot: [id number], exp: 2026  Casette:  lot:  [id number], exp: 2027    Number of cycles including fill: 5  Dwell time: 1:22  Last fill volume  0 ML  Initial drain alarm: 0 ml    Cycler ready for hook up.  Report given to second floor RN

## 2025-01-25 NOTE — PROGRESS NOTES
Called lab for Heparin Unfractionated Anti Xa Level put in STAT order at 1918 still has not been drawn. Lab will send someone shortly.

## 2025-01-25 NOTE — PROGRESS NOTES
Renal Medicine       Patient comfortable in bed  No CP or SOB    BP in range  No current supplemental O2    Same CCPD orders tonight  2.5% dextrose         Recent Labs   Lab 01/24/25  0425 01/22/25  0733   POTASSIUM 4.5 4.2           GENEVA Ray    [facility]  [phone]

## 2025-01-25 NOTE — PLAN OF CARE
Date/Time: 1/24/25 1900 - 2300  Diagnosis: R lower extremity leg pain w/ R leg and heel ulcer. 1/23/25 - Diagnostic angiogram done  Mental Status: A&O x4  Activity/dangle: Ast of 1 GB/W  Diet: Renal diet  Pain: Denies pain  Rosario/Voiding: Pt on peritoneal dialysis  Tele/Restraints/Iso: Contact isolation maintained  02/LDA: 1-2 L O2 NC. Heparin infusing at 13 mL/hr  D/C Date: TBD  Other Info: BLE dressing is CDI. Peritoneal dialysis started around 2015. BG Check w/ insulin coverage.    Hep anti Xa level has been drawn and is in process.

## 2025-01-26 LAB
GLUCOSE BLDC GLUCOMTR-MCNC: 122 MG/DL (ref 70–99)
GLUCOSE BLDC GLUCOMTR-MCNC: 136 MG/DL (ref 70–99)
GLUCOSE BLDC GLUCOMTR-MCNC: 146 MG/DL (ref 70–99)
GLUCOSE BLDC GLUCOMTR-MCNC: 189 MG/DL (ref 70–99)
GLUCOSE BLDC GLUCOMTR-MCNC: 190 MG/DL (ref 70–99)
GLUCOSE BLDC GLUCOMTR-MCNC: 210 MG/DL (ref 70–99)
UFH PPP CHRO-ACNC: 0.28 IU/ML

## 2025-01-26 PROCEDURE — 250N000011 HC RX IP 250 OP 636: Performed by: STUDENT IN AN ORGANIZED HEALTH CARE EDUCATION/TRAINING PROGRAM

## 2025-01-26 PROCEDURE — 90945 DIALYSIS ONE EVALUATION: CPT

## 2025-01-26 PROCEDURE — 120N000001 HC R&B MED SURG/OB

## 2025-01-26 PROCEDURE — 99233 SBSQ HOSP IP/OBS HIGH 50: CPT | Performed by: HOSPITALIST

## 2025-01-26 PROCEDURE — 36415 COLL VENOUS BLD VENIPUNCTURE: CPT | Performed by: HOSPITALIST

## 2025-01-26 PROCEDURE — 90945 DIALYSIS ONE EVALUATION: CPT | Performed by: INTERNAL MEDICINE

## 2025-01-26 PROCEDURE — 99232 SBSQ HOSP IP/OBS MODERATE 35: CPT | Performed by: PODIATRIST

## 2025-01-26 PROCEDURE — 250N000013 HC RX MED GY IP 250 OP 250 PS 637: Performed by: STUDENT IN AN ORGANIZED HEALTH CARE EDUCATION/TRAINING PROGRAM

## 2025-01-26 PROCEDURE — 85520 HEPARIN ASSAY: CPT | Performed by: HOSPITALIST

## 2025-01-26 RX ADMIN — PIPERACILLIN AND TAZOBACTAM 2.25 G: 2; .25 INJECTION, POWDER, FOR SOLUTION INTRAVENOUS at 00:31

## 2025-01-26 RX ADMIN — PIPERACILLIN AND TAZOBACTAM 2.25 G: 2; .25 INJECTION, POWDER, FOR SOLUTION INTRAVENOUS at 09:20

## 2025-01-26 RX ADMIN — INSULIN GLARGINE 15 UNITS: 100 INJECTION, SOLUTION SUBCUTANEOUS at 22:08

## 2025-01-26 RX ADMIN — SEVELAMER CARBONATE 800 MG: 800 TABLET, FILM COATED ORAL at 17:56

## 2025-01-26 RX ADMIN — SEVELAMER CARBONATE 800 MG: 800 TABLET, FILM COATED ORAL at 12:56

## 2025-01-26 RX ADMIN — HEPARIN SODIUM 1600 UNITS/HR: 10000 INJECTION, SOLUTION INTRAVENOUS at 15:52

## 2025-01-26 RX ADMIN — CLOPIDOGREL BISULFATE 75 MG: 75 TABLET ORAL at 19:57

## 2025-01-26 RX ADMIN — Medication 1 CAPSULE: at 09:20

## 2025-01-26 RX ADMIN — PANTOPRAZOLE SODIUM 40 MG: 20 TABLET, DELAYED RELEASE ORAL at 09:20

## 2025-01-26 RX ADMIN — ALLOPURINOL 200 MG: 100 TABLET ORAL at 19:57

## 2025-01-26 RX ADMIN — METOPROLOL SUCCINATE 25 MG: 25 TABLET, EXTENDED RELEASE ORAL at 09:20

## 2025-01-26 RX ADMIN — OXYCODONE HYDROCHLORIDE 5 MG: 5 TABLET ORAL at 12:29

## 2025-01-26 RX ADMIN — HEPARIN SODIUM 1600 UNITS/HR: 10000 INJECTION, SOLUTION INTRAVENOUS at 00:31

## 2025-01-26 RX ADMIN — CALCITRIOL CAPSULES 0.25 MCG 0.25 MCG: 0.25 CAPSULE ORAL at 22:07

## 2025-01-26 RX ADMIN — PIPERACILLIN AND TAZOBACTAM 2.25 G: 2; .25 INJECTION, POWDER, FOR SOLUTION INTRAVENOUS at 17:55

## 2025-01-26 RX ADMIN — SEVELAMER CARBONATE 800 MG: 800 TABLET, FILM COATED ORAL at 09:20

## 2025-01-26 RX ADMIN — ATORVASTATIN CALCIUM 40 MG: 40 TABLET, FILM COATED ORAL at 22:07

## 2025-01-26 ASSESSMENT — ACTIVITIES OF DAILY LIVING (ADL)
ADLS_ACUITY_SCORE: 57

## 2025-01-26 NOTE — PROGRESS NOTES
[facility]  Hospitalist Progress Note        Alejandro Schwarz MD   01/26/2025        Interval History:        - no acute issues overnight apart from right foot pain; not hypoxic but put on 1 to 2 L oxygen for comfort  - podiatry following and considering debridement, likely after bypass surgery  - continues on peritoneal dialysis         Assessment and Plan:        Arnulfo Pritchett is a 64 year old male with PMH of ESRD (on peritoneal dialysis), diabetes mellitus, GERD, TALI, gout, CAD (h/o multiple stents), chronic paroxysmal A-fib (s/p ablation, on warfarin) , Chronic severe HFrEF, ischemic cardiomyopathy (EF 20-25%),  hyperlipidemia, PAD (s/p left common femoral endarterectomy and a left common femoral artery to tibioperoneal trunk bypass on 25 October 2024 and Follow-up VLADIMIR Doppler on 1/6/2025 noted critical resting arterial insufficiency of the right lower extremity), renal cell carcinoma (s/p nephrectomy) who presented to outside hospital with right leg pain along with shortness of breath or cough and was also found to have right leg ulcer along with right heel ulcer and transferred to [facility] for vascular surgery evaluation on 1/21/2025.     Right heel gangrene with heel ulcer and ischemic pain  Occluded right SFA around the stent (IR angiogram 1/23/25)    H/o Left lower extremity critical limb ischemia (s/p left common femoral endarterectomy and a left common femoral artery to tibioperoneal trunk bypass on 25 October 2024)  Status ligation of the great saphenous vein on 10/27/2024  Hx of acute RLE arterial occlusion s/p SFA popliteal balloon angioplasty and stenting (5/2024)   Dyslipidemia    - Follows with Dr. Gray and underwent ultrasound VLADIMIR Doppler on 1/6/2025 which was concerning for critical resting arterial insufficiency in the right lower extremity  - Arterial Doppler left 1/6/2025-patent left common femoral artery to tibioperoneal trunk bypass  - PTA regimen includes  "Coumadin and Plavix  - INR therapeutic on admission     - Remains afebrile, mild leukocytosis 13, , ESR 86, normal lactic acid  - evaluated by vascular surgery, RLE duplex obtained (1/22) and note SFA stent is occluded,   - Vascular reversed warfarin and initiated Heparin drip when INR <2 (1/22)  - s/p IR angiogram 1/23/25; per vascular: note of \"CFA disease, SFA occlusion around the stent,  BK pop-TPT open, but diseased. AT/peroneal/PT runoff, best with AT to DP\"  - vascular surgery plans for bypass on 1/27/25  - MRI right ankle (1/25) noted no evidence for osteomyelitis; small tibiotalar joint effusion  - podiatry following and considering debridement, likely after bypass surgery  - will continue with empiric IV Zosyn  - continue PTA Plavix, Lipitor     Cough and shortness of breath with recent pneumonia  Moderate to large right pleural effusion, s/p thoracentesis (1/24)  Acute hypoxic respiratory failure  - On admission presented with shortness of breath and nonproductive cough; was recently diagnosed with pneumonia on 1/3 and completed course of cefdinir and is taking twice daily nebulization treatments  - COVID-19, influenza and RSV is negative  - Chest x-ray was noted slight increase in volume of the now moderate right pleural effusion with associated compressive atelectasis without any pneumothorax  - Encourage incentive spirometer  - empiric Zosyn as above for foot wound  - PRN cough meds  - was requiring on 1-2 L nasal cannula oxygen ; CT chest 1/23 noted large right pleural effusion>left  - s/p right thoracentesis with removal of 1.5 L (1/24); respiratory status much improved , oxygen weaned down to room air but occasionally needing for comfort  - As needed cough medicine      H/ of chronic paroxysmal A-fib (s/p ablation )  Coronary artery disease status multiple stents  Hyperlipidemia  Chronic severe HFrEF, ischemic cardiomyopathy, not in acute exacerbation (20-25%):   - Continue with PTA " metoprolol succinate 25 mg daily   - metoprolol PRN IV for sustained heart rate> 120  - anticoagulation discussion as above     Diabetes mellitus  - PTA on Lantus 35 units daily   - started on lower dose at 15 units daily   - will up titrate as needed pending plans for procedures; will continue with current dose for now since will be NPO for surgery  - moderate resistance sliding scale insulin with hypoglycemia protocol      ESRD on peritoneal dialysis  Secondary hyperparathyroidism  Chronic hyponatremia  - his sodium during the course of recent few months have been fluctuating between around 130s  - nephrology following and initiated on peritoneal dialysis  - Continue with PTA sevelamer and Sensipar       Anemia of chronic disease  - His baseline hemoglobin fluctuates between 10.2-11.4  - Hb stable around baseline     GERD  - continue with PTA PPI     Gout  -continue PTA allopurinol      Renal cell carcinoma status post right nephrectomy  - Noted     Obstructive sleep apnea  -Noted and seems intolerant to CPAP    Diet: Renal Diet (dialysis)      DVT Prophylaxis: warfarin/Heparin    Code status: full code    Disposition:   Medically Ready for Discharge: Anticipated in 2-4 Days pending vascular surgery and podiatry intervention    Care plan discussed with patient and nursing       Clinically Significant Risk Factors Present on Admission               # Hypoalbuminemia: Lowest albumin = 2.7 g/dL at 1/21/2025  4:06 PM, will monitor as appropriate             # Hypertension: Noted on problem list                        # Financial/Environmental Concerns:                              Physical Exam:      Blood pressure 117/88, pulse (!) 108, temperature 97.7  F (36.5  C), temperature source Oral, resp. rate 16, weight 81.5 kg (179 lb 9.6 oz), SpO2 97%.  Vitals:    01/22/25 0700 01/23/25 0626 01/26/25 0724   Weight: 82.1 kg (181 lb) 81.3 kg (179 lb 3.7 oz) 81.5 kg (179 lb 9.6 oz)     Vital Signs with Ranges  Temp:  [97.7   F (36.5  C)-98.4  F (36.9  C)] 97.7  F (36.5  C)  Pulse:  [] 108  Resp:  [16] 16  BP: (102-117)/(75-88) 117/88  SpO2:  [93 %-98 %] 97 %  I/O's Last 24 hours  No intake/output data recorded.    Constitutional: Alert, awake and oriented; resting comfortably in no apparent distress       Oral cavity: Moist mucosa   Cardiovascular: Normal s1 s2, regular rate and rhythm, no murmur   Lungs: B/l clear, no wheezes or crepitations   Abdomen: Soft, nt, nd, no guarding, rigidity or rebound; BS +   LE : Right foot with gangrenous heel ulcer     Musculoskeletal/Neuro Power 5/5 in all extremities; No focal neurological deficits noted   Psychiatry: normal mood and affect                Medications:        Current Facility-Administered Medications   Medication Dose Route Frequency Provider Last Rate Last Admin    allopurinol (ZYLOPRIM) tablet 200 mg  200 mg Oral QPM Lizett Davis MD   200 mg at 01/25/25 2219    atorvastatin (LIPITOR) tablet 40 mg  40 mg Oral At Bedtime Lizett Davis MD   40 mg at 01/25/25 2219    calcitRIOL (ROCALTROL) capsule 0.25 mcg  0.25 mcg Oral At Bedtime Lizett Davis MD   0.25 mcg at 01/25/25 2219    cinacalcet (SENSIPAR) tablet 60 mg  60 mg Oral Q Mon Wed Fri AM Lizett Davis MD   60 mg at 01/24/25 2151    clopidogrel (PLAVIX) tablet 75 mg  75 mg Oral QPM Lizett Davis MD   75 mg at 01/25/25 2219    insulin aspart (NovoLOG) injection (RAPID ACTING)  1-7 Units Subcutaneous TID AC Lizett Davis MD   1 Units at 01/24/25 1323    insulin aspart (NovoLOG) injection (RAPID ACTING)  1-5 Units Subcutaneous At Bedtime Lizett Davis MD   2 Units at 01/25/25 2221    insulin glargine (LANTUS PEN) injection 15 Units  15 Units Subcutaneous At Bedtime Lizett Davis MD   15 Units at 01/25/25 2221    metoprolol succinate ER (TOPROL-XL) 24 hr half-tab 25 mg  25 mg Oral Daily Lizett Davis MD   25 mg at 01/24/25 0850    multivitamin RENAL (TRIPHROCAPS) capsule 1 capsule  1 capsule Oral Daily Lizett Davis MD    1 capsule at 01/25/25 0856    pantoprazole (PROTONIX) EC tablet 40 mg  40 mg Oral QAM AC Lizett Davis MD   40 mg at 01/25/25 0856    piperacillin-tazobactam (ZOSYN) 2.25 g vial to attach to  ml bag  2.25 g Intravenous Q8H Lizett Davis  mL/hr at 01/25/25 1726 2.25 g at 01/26/25 0031    sevelamer carbonate (RENVELA) tablet 800 mg  800 mg Oral TID w/meals Lizett Davis MD   800 mg at 01/25/25 1806    sodium chloride (PF) 0.9% PF flush 3 mL  3 mL Intracatheter Q8H Lizett Davis MD   3 mL at 01/22/25 2356     PRN Meds:   Current Facility-Administered Medications   Medication Dose Route Frequency Provider Last Rate Last Admin    acetaminophen (TYLENOL) tablet 650 mg  650 mg Oral Q4H PRN Lizett Davis MD   650 mg at 01/22/25 2118    Or    acetaminophen (TYLENOL) Suppository 650 mg  650 mg Rectal Q4H PRN Lizett Davis MD        calcium carbonate (TUMS) chewable tablet 1,000 mg  1,000 mg Oral 4x Daily PRN Lizett Davis MD        Continuing statin from home medication list OR statin order already placed during this visit   Does not apply DOES NOT GO TO Lizett Sykes MD        glucose gel 15-30 g  15-30 g Oral Q15 Min PRN Lizett Davis MD        Or    dextrose 50 % injection 25-50 mL  25-50 mL Intravenous Q15 Min PRN Lizett Davis MD        Or    glucagon injection 1 mg  1 mg Subcutaneous Q15 Min PRLizett Paredes MD        dianeal PD LOW calcium-2.5% dex (calcium 2.5 mEq/L) 6,000 mL PERITONEAL DIALYSATE   Dialysis [facility] Supplied PD Lizett Davis MD        gentamicin (GARAMYCIN) 0.1 % cream   Topical Daily PRN Lizett Davis MD        gentamicin (GARAMYCIN) 0.1 % cream   Topical Daily PRN Lizett Davis MD        HYDROmorphone (DILAUDID) injection 0.2 mg  0.2 mg Intravenous Q2H PRN Lizett Davis MD        HYDROmorphone (DILAUDID) injection 0.4 mg  0.4 mg Intravenous Q2H PRN Lizett Davis MD        lidocaine (LMX4) cream   Topical Q1H PRN iLzett Davis MD        lidocaine 1 % 0.1-1 mL  0.1-1  mL Other Q1H PRN Lizett Davis MD        metoprolol (LOPRESSOR) injection 2.5 mg  2.5 mg Intravenous Q4H PRN Lizett Davis MD        midodrine (PROAMATINE) tablet 2.5 mg  2.5 mg Oral Once PRN Lizett Davis MD        naloxone (NARCAN) injection 0.2 mg  0.2 mg Intravenous Q2 Min PRN Lizett Davis MD        Or    naloxone (NARCAN) injection 0.4 mg  0.4 mg Intravenous Q2 Min PRN Lizett Davis MD        Or    naloxone (NARCAN) injection 0.2 mg  0.2 mg Intramuscular Q2 Min PRN Lizett Davis MD        Or    naloxone (NARCAN) injection 0.4 mg  0.4 mg Intramuscular Q2 Min PRN Lizett Davis MD        ondansetron (ZOFRAN ODT) ODT tab 4 mg  4 mg Oral Q6H PRN Lizett Davis MD   4 mg at 01/24/25 0903    Or    ondansetron (ZOFRAN) injection 4 mg  4 mg Intravenous Q6H PRN Lizett Davis MD        oxyCODONE (ROXICODONE) tablet 5 mg  5 mg Oral Q4H PRN Lizett Davis MD   5 mg at 01/22/25 2123    oxyCODONE IR (ROXICODONE) half-tab 2.5 mg  2.5 mg Oral Q4H PRN Lizett Davis MD        Patient is already receiving anticoagulation with heparin, enoxaparin (LOVENOX), warfarin (COUMADIN)  or other anticoagulant medication   Does not apply Continuous PRN Lizett Davis MD        senjeremiah-docusate (SENOKOT-S/PERICOLACE) 8.6-50 MG per tablet 1 tablet  1 tablet Oral BID PRLizett Paredes MD        Or    senna-docusate (SENOKOT-S/PERICOLACE) 8.6-50 MG per tablet 2 tablet  2 tablet Oral BID PRLizett Paredes MD        sodium chloride (PF) 0.9% PF flush 3 mL  3 mL Intracatheter q1 min prLizett Paredes MD                Data:      All new lab and imaging data was reviewed.   Recent Labs   Lab Test 01/25/25  0735 01/24/25  0425 01/23/25  1517 01/23/25  0449 01/22/25  2157 01/22/25  1451   WBC 15.2* 12.6*  --   --   --  13.6*   HGB 9.8* 10.4*  --   --   --  12.2*   MCV 96 95  --   --   --  96    409  --   --   --  457*   INR  --   --  1.46* 1.65* 1.78*  --       Recent Labs   Lab Test 01/26/25  0106 01/25/25  2212 01/25/25  1801  "01/24/25  0838 01/24/25  0425 01/22/25  0952 01/22/25  0733 01/22/25  0228 01/21/25  1606   NA  --   --   --   --  133*  --  131*  --  130*   POTASSIUM  --   --   --   --  4.5  --  4.2  --  4.3   CHLORIDE  --   --   --   --  93*  --  93*  --  91*   CO2  --   --   --   --  26  --  25  --  25   BUN  --   --   --   --  52.6*  --  54.0*  --  52.4*   CR  --   --   --   --  7.52*  --  7.65*  --  7.03*   ANIONGAP  --   --   --   --  14  --  13  --  14   ETRENCE  --   --   --   --  9.3  --  9.8  --  10.4   * 262* 134*   < > 257*   < > 129*   < > 170*    < > = values in this interval not displayed.     No lab results found.    Invalid input(s): \"TROP\", \"TROPONINIES\"      "

## 2025-01-26 NOTE — PROGRESS NOTES
Cycler set up per protocol and per treatment orders     Results from previous treatment:  Effluent color and clarity: yellow, clear  Total UF: 566ml  Initial Drain: 84ml  Avg Dwell: 1:17  Lost Dwell: 27 min    Cycler Serial Number: [id number]  Total Treatment Volume: [id number]  Total treatment time: 9 hrs  Fill Volume: 2000ml  Last Fill Volume:0ml  Heater ba.5% 6000mL;  Lot #: [id number];  Expiration Date: 2026  Side Bag #1: 2.5% 6000mL;  Lot #: [id number];  Expiration Date: 2026  Number of cycles including final fill: 5  Last Fill Volume: 0ml  Initial Drain Alarm: 0ml  PD orders reviewed with Patient procedure and ESRD teaching done and questions answered.  Cycler ready for hook-up.    Report given to: YISEL Frey RN

## 2025-01-26 NOTE — PROVIDER NOTIFICATION
MD Notification    Notified Person: MD    Notified Person Name: Patrick Hein    Notification Date/Time: 1/26/25 4064    Notification Interaction: Vocera Message    Purpose of Notification: Patient's R heel/foot with more pain and noted color change, more dusky/cyanotic. Also unable to find dorsal pedis pulse with doppler. Patient notes no change in sensation and able to move extremity.     Orders Received: No change in plan, surgery planned for tomorrow    Comments:

## 2025-01-26 NOTE — PROGRESS NOTES
Renal Medicine       Patient comfortable in bed  No CP or SOB    BP in range  No current supplemental O2       ml range     Same CCPD orders tonight  2.5% dextrose         Recent Labs   Lab 01/24/25  0425 01/22/25  0733   POTASSIUM 4.5 4.2           GENEVA Ray    [facility]  [phone]

## 2025-01-26 NOTE — PROGRESS NOTES
[facility]  PROGRESS NOTE    CHIEF COMPLAINT:      I was asked by Milo Carrion MD  to evaluate this patient for right foot.    PATIENT HISTORY:  Arnulfo Pritchett is a 64 year old male seen in follow up for his right foot. Ongoing pain to right heel.         Review of Systems:  A 10 point review of systems was performed and is positive for that noted above in the patient history.  All other areas are negative.     PAST MEDICAL HISTORY:   Past Medical History:   Diagnosis Date    CAD (coronary artery disease)     Chronic systolic heart failure (H)     ESRD (end stage renal disease) on dialysis (H)     Gastroesophageal reflux disease without esophagitis     Gout     HLD (hyperlipidemia)     TALI (obstructive sleep apnea)     PAD (peripheral artery disease)     S/p RLE stenting of SFA/popliteal arteries    Type 2 diabetes mellitus with diabetic neuropathy (H)         PAST SURGICAL HISTORY:   Past Surgical History:   Procedure Laterality Date    AMPUTATE TOE(S) Left 10/27/2024    Procedure: AMPUTATION, TOE left great toe;  Surgeon: Haley Joseph DPM, Podiatry/Foot and Ankle Surgery;  Location:  OR    BYPASS GRAFT FEMOROTIBIAL Left 10/25/2024    Procedure: LEFT FEMORAL ENDARTERECTOMY, LEFT FEMORAL TO TIBIAL PERONEAL TRUNK BYPASS WITH LEFT GREAT SEPHANEOUS VEIN, WOUND VAC PLACEMENT ON LEFT GROIN.;  Surgeon: Raul Gray MD;  Location:  OR    BYPASS GRAFT FEMOROTIBIAL Left 10/27/2024    Procedure: CREATION, BYPASS, VASCULAR, FEMORAL TO TIBIAL REVISION OF BYPASS LEFT COMMON FEMORAL TO TIBIAL.;  Surgeon: Raul Gray MD;  Location:  OR    CV CORONARY ANGIOGRAM N/A 11/27/2024    Procedure: Coronary Angiogram;  Surgeon: Clem Matt MD;  Location:  [facility]    CV LOWER EXTREMITY ANGIOGRAM LEFT Left 10/27/2024    Procedure: Angiogram Lower Extremity Left;  Surgeon: Raul Gray MD;  Location:  OR    CV PCI N/A 11/27/2024    Procedure:  Percutaneous Coronary Intervention;  Surgeon: Clem Matt MD;  Location:  [facility]    IR LOWER EXTREMITY ANGIOGRAM LEFT  10/17/2024    IR LOWER EXTREMITY ANGIOGRAM RIGHT  1/23/2025        MEDICATIONS:  Reviewed in Epic. Current.     ALLERGIES:  No Known Allergies     SOCIAL HISTORY:   Social History     Socioeconomic History    Marital status:      Spouse name: Not on file    Number of children: Not on file    Years of education: Not on file    Highest education level: Not on file   Occupational History    Not on file   Tobacco Use    Smoking status: Former     Types: Cigarettes    Smokeless tobacco: Never   Vaping Use    Vaping status: Never Used   Substance and Sexual Activity    Alcohol use: Not Currently     Comment: quit 20 years    Drug use: Never    Sexual activity: Not on file   Other Topics Concern    Not on file   Social History Narrative    Not on file     Social Drivers of Health     Financial Resource Strain: Low Risk  (1/21/2025)    Financial Resource Strain     Within the past 12 months, have you or your family members you live with been unable to get utilities (heat, electricity) when it was really needed?: No   Food Insecurity: Low Risk  (1/21/2025)    Food Insecurity     Within the past 12 months, did you worry that your food would run out before you got money to buy more?: No     Within the past 12 months, did the food you bought just not last and you didn t have money to get more?: No   Transportation Needs: High Risk (1/21/2025)    Transportation Needs     Within the past 12 months, has lack of transportation kept you from medical appointments, getting your medicines, non-medical meetings or appointments, work, or from getting things that you need?: Yes   Physical Activity: Unknown (10/8/2024)    Exercise Vital Sign     Days of Exercise per Week: 0 days     Minutes of Exercise per Session: Not on file   Stress: No Stress Concern Present (10/8/2024)    [place] [employer] - Occupational Stress Questionnaire     Feeling of Stress : Only a little   Social Connections: Unknown (10/8/2024)    Social Connection and Isolation Panel [NHANES]     Frequency of Communication with Friends and Family: Not on file     Frequency of Social Gatherings with Friends and Family: Patient declined     Attends Restorationist Services: Not on file     Active Member of Clubs or Organizations: Not on file     Attends Club or Organization Meetings: Not on file     Marital Status: Not on file   Interpersonal Safety: Low Risk  (1/21/2025)    Interpersonal Safety     Do you feel physically and emotionally safe where you currently live?: Yes     Within the past 12 months, have you been hit, slapped, kicked or otherwise physically hurt by someone?: No     Within the past 12 months, have you been humiliated or emotionally abused in other ways by your partner or ex-partner?: No   Housing Stability: Low Risk  (1/21/2025)    Housing Stability     Do you have housing? : Yes     Are you worried about losing your housing?: No        FAMILY HISTORY: No family history on file.     EXAM:Vitals: /89 (BP Location: Left arm)   Pulse 104   Temp 97.3  F (36.3  C) (Oral)   Resp 18   Wt 81.5 kg (179 lb 9.6 oz)   SpO2 97%   BMI 23.70 kg/m    BMI= Body mass index is 23.7 kg/m .    LABS:     Last Comprehensive Metabolic Panel:  Sodium   Date Value Ref Range Status   01/24/2025 133 (L) 135 - 145 mmol/L Final     Potassium   Date Value Ref Range Status   01/24/2025 4.5 3.4 - 5.3 mmol/L Final     Chloride   Date Value Ref Range Status   01/24/2025 93 (L) 98 - 107 mmol/L Final     Carbon Dioxide (CO2)   Date Value Ref Range Status   01/24/2025 26 22 - 29 mmol/L Final     Anion Gap   Date Value Ref Range Status   01/24/2025 14 7 - 15 mmol/L Final     GLUCOSE BY METER POCT   Date Value Ref Range Status   01/26/2025 136 (H) 70 - 99 mg/dL Final     Urea Nitrogen   Date Value Ref Range Status    01/24/2025 52.6 (H) 8.0 - 23.0 mg/dL Final     Creatinine   Date Value Ref Range Status   01/24/2025 7.52 (H) 0.67 - 1.17 mg/dL Final     GFR Estimate   Date Value Ref Range Status   01/24/2025 7 (L) >60 mL/min/1.73m2 Final     Comment:     eGFR calculated using 2021 CKD-EPI equation.     Calcium   Date Value Ref Range Status   01/24/2025 9.3 8.8 - 10.4 mg/dL Final     Comment:     Reference intervals for this test were updated on 7/16/2024 to reflect our healthy population more accurately. There may be differences in the flagging of prior results with similar values performed with this method. Those prior results can be interpreted in the context of the updated reference intervals.     Lab Results   Component Value Date    WBC 13.0 01/22/2025     Lab Results   Component Value Date    RBC 3.49 01/22/2025     Lab Results   Component Value Date    HGB 10.8 01/22/2025     Lab Results   Component Value Date    HCT 33.8 01/22/2025     Lab Results   Component Value Date     01/22/2025      Lab Results   Component Value Date    INR 2.70 01/22/2025    INR 2.44 01/21/2025    INR 4.7 01/14/2025    INR 2.4 01/09/2025    INR 1.6 01/06/2025    INR 1.9 01/02/2025    INR 1.3 12/30/2024    INR 1.0 12/27/2024    INR 1.1 12/26/2024    INR 1.0 12/24/2024    INR 2.28 12/07/2024    INR 2.59 12/06/2024    INR 2.30 12/05/2024    INR 2.29 12/05/2024    INR 2.39 12/03/2024    INR 1.92 12/02/2024    INR 1.87 12/01/2024    INR 1.64 11/30/2024    INR 1.75 11/29/2024    INR 1.76 11/28/2024        General appearance: Patient is alert and fully cooperative with history & exam.  No sign of distress is noted during the visit.      Respiratory: Breathing is regular & unlabored while sitting.      HEENT: Hearing is intact to spoken word.  Speech is clear.  No gross evidence of visual impairment that would impact ambulation.      Dermatologic: Right heel: evaluated, with large area of gangrenous/dry tissue to posterior and plantar sites. No  drainage from the site. Tender on palpation. Pain from heel to along posterior achilles. No cellulitis.      Vascular: Dorsalis pedis and posterior tibial pulses are difficult to palpate.     Neurologic: Lower extremity sensation is diminished, bilateral foot, to light touch.  No evidence of neurological-based weakness or contracture in the lower extremities.       Musculoskeletal: Patient is ambulatory without an assistive device or brace.  No gross foot or ankle deformity noted.       Psychiatric: Affect is pleasant & appropriate.      All cultures:  Recent Labs   Lab 01/24/25  1425 01/21/25  2346   CULTURE No growth, less than 1 day Staphylococcus aureus MRSA*        IMAGING:     Arterial US 1/22:    IMPRESSION:  1.  No blood flow demonstrated in the distal superficial femoral, popliteal, and runoff arteries. There may be trickle blood flow in the distal runoff arteries, though difficult to distinguish from artifact.     2.  Uncertain if stent is in the right lower extremity. Previous angiogram images are not available for review.    Right ankle MRI:   IMPRESSION:  1.  There is a soft tissue ulceration over the posterior aspect of the hindfoot with some surrounding edema or cellulitis but no evidence for abscess.  2.  No evidence for osteomyelitis.  3.  Small tibiotalar joint effusion.  4.  No evidence for fracture.  5.  Acute on chronic plantar fasciitis. Plantar calcaneal spurring.  6.  No additional tendinous or ligamentous pathology.    I personally reviewed the images and agree with the reports as stated.      ASSESSMENT:  Right heel gangrene --> no osteomyelitis   Peripheral Arterial Disease   Diabetes Mellitus --> hba1c: 5.7     MEDICAL DECISION MAKING:   -Discussed all findings with patient. Chart and imaging reviewed.     -Reviewed MRI with patient which doesn't show any osteomyelitis. Given amount of necrotic tissue, still may need debridement and there is concern that the  necrotic tissue extends to  bone, which would be challenging to heal even with significant improvement in blood flow. Patient understands this.     -Vascular planning procedure for 1/27/25. Plan for angiogram and likely bypass.     -Will follow peripherally. Continue to float and offload heel at all times.     Alyce Salas DPM   [place] [facility]le [facility]  Available via [employer] Text

## 2025-01-26 NOTE — PLAN OF CARE
Date/Time: 1/25/25 1900-0730    Summary: 63 y/o M w/PMH of ESRD (on PD), DM, GERD, TALI, gout, CAD, a fib (on warfarin), HLD, etc.  Transfer from [place] with R lower extremity leg pain w/ R leg and heel ulcer. Diagnostic angiogram done 1/23.  Orientation: A&Ox4  Behavior & Aggression: green  Activity: Ax1, GB/w; pivot  Diet: renal  Fall risk: yes  Isolation: contact for MRSA  Pain: denies  B&B: continent, no BM this shift.  VS/O2: VSS, 1 L NC for comfort.  IV: R PIV infusing heparin @ 1600 units; hep Xa axel AM.  Drains/Devices: PD site  Tele: N/A  Abnormal Labs: hep Xa WNL, recheck in AM on 1/27.  Skin: intact, scattered bruising; R radial site.  Consults/Procedures: N/A  D/C Date: TBD  Other: CMS intact; doppler to RLE.

## 2025-01-27 ENCOUNTER — ANESTHESIA EVENT (OUTPATIENT)
Dept: SURGERY | Facility: CLINIC | Age: 65
End: 2025-01-27
Payer: MEDICARE

## 2025-01-27 ENCOUNTER — APPOINTMENT (OUTPATIENT)
Dept: GENERAL RADIOLOGY | Facility: CLINIC | Age: 65
DRG: 853 | End: 2025-01-27
Attending: SURGERY
Payer: MEDICARE

## 2025-01-27 ENCOUNTER — APPOINTMENT (OUTPATIENT)
Dept: GENERAL RADIOLOGY | Facility: CLINIC | Age: 65
DRG: 853 | End: 2025-01-27
Payer: MEDICARE

## 2025-01-27 ENCOUNTER — ANESTHESIA (OUTPATIENT)
Dept: SURGERY | Facility: CLINIC | Age: 65
End: 2025-01-27
Payer: MEDICARE

## 2025-01-27 ENCOUNTER — APPOINTMENT (OUTPATIENT)
Dept: GENERAL RADIOLOGY | Facility: CLINIC | Age: 65
DRG: 853 | End: 2025-01-27
Attending: ANESTHESIOLOGY
Payer: MEDICARE

## 2025-01-27 VITALS — HEART RATE: 84 BPM | TEMPERATURE: 97.9 F

## 2025-01-27 LAB
ABO + RH BLD: NORMAL
ALBUMIN SERPL BCG-MCNC: 2.3 G/DL (ref 3.5–5.2)
ALLEN'S TEST: ABNORMAL
ALLEN'S TEST: ABNORMAL
ALLEN'S TEST: NO
ALP SERPL-CCNC: 101 U/L (ref 40–150)
ALT SERPL W P-5'-P-CCNC: 8 U/L (ref 0–70)
ANION GAP SERPL CALCULATED.3IONS-SCNC: 14 MMOL/L (ref 7–15)
ANION GAP SERPL CALCULATED.3IONS-SCNC: 17 MMOL/L (ref 7–15)
ANION GAP SERPL CALCULATED.3IONS-SCNC: 17 MMOL/L (ref 7–15)
APTT PPP: 42 SECONDS (ref 22–38)
AST SERPL W P-5'-P-CCNC: 12 U/L (ref 0–45)
ATRIAL RATE - MUSE: 114 BPM
BASE EXCESS BLDA CALC-SCNC: -0.6 MMOL/L (ref -3–3)
BASE EXCESS BLDA CALC-SCNC: -2.5 MMOL/L (ref -3–3)
BASE EXCESS BLDA CALC-SCNC: -4.1 MMOL/L (ref -3–3)
BASOPHILS # BLD AUTO: 0 10E3/UL (ref 0–0.2)
BASOPHILS # BLD AUTO: 0.1 10E3/UL (ref 0–0.2)
BASOPHILS NFR BLD AUTO: 0 %
BASOPHILS NFR BLD AUTO: 0 %
BILIRUB SERPL-MCNC: 0.3 MG/DL
BLD GP AB SCN SERPL QL: NEGATIVE
BLD PROD TYP BPU: NORMAL
BLD PROD TYP BPU: NORMAL
BLOOD COMPONENT TYPE: NORMAL
BLOOD COMPONENT TYPE: NORMAL
BUN SERPL-MCNC: 48.5 MG/DL (ref 8–23)
BUN SERPL-MCNC: 48.6 MG/DL (ref 8–23)
BUN SERPL-MCNC: 48.7 MG/DL (ref 8–23)
CALCIUM SERPL-MCNC: 9.4 MG/DL (ref 8.8–10.4)
CALCIUM SERPL-MCNC: 9.5 MG/DL (ref 8.8–10.4)
CALCIUM SERPL-MCNC: 9.9 MG/DL (ref 8.8–10.4)
CHLORIDE SERPL-SCNC: 95 MMOL/L (ref 98–107)
CHLORIDE SERPL-SCNC: 97 MMOL/L (ref 98–107)
CHLORIDE SERPL-SCNC: 98 MMOL/L (ref 98–107)
CODING SYSTEM: NORMAL
CODING SYSTEM: NORMAL
CREAT SERPL-MCNC: 7.24 MG/DL (ref 0.67–1.17)
CREAT SERPL-MCNC: 7.38 MG/DL (ref 0.67–1.17)
CREAT SERPL-MCNC: 7.43 MG/DL (ref 0.67–1.17)
CROSSMATCH: NORMAL
CROSSMATCH: NORMAL
DIASTOLIC BLOOD PRESSURE - MUSE: NORMAL MMHG
EGFRCR SERPLBLD CKD-EPI 2021: 8 ML/MIN/1.73M2
EOSINOPHIL # BLD AUTO: 0 10E3/UL (ref 0–0.7)
EOSINOPHIL # BLD AUTO: 0.1 10E3/UL (ref 0–0.7)
EOSINOPHIL NFR BLD AUTO: 0 %
EOSINOPHIL NFR BLD AUTO: 1 %
ERYTHROCYTE [DISTWIDTH] IN BLOOD BY AUTOMATED COUNT: 17.8 % (ref 10–15)
ERYTHROCYTE [DISTWIDTH] IN BLOOD BY AUTOMATED COUNT: 18.3 % (ref 10–15)
GLUCOSE BLDC GLUCOMTR-MCNC: 117 MG/DL (ref 70–99)
GLUCOSE BLDC GLUCOMTR-MCNC: 119 MG/DL (ref 70–99)
GLUCOSE BLDC GLUCOMTR-MCNC: 142 MG/DL (ref 70–99)
GLUCOSE BLDC GLUCOMTR-MCNC: 170 MG/DL (ref 70–99)
GLUCOSE BLDC GLUCOMTR-MCNC: 200 MG/DL (ref 70–99)
GLUCOSE BLDC GLUCOMTR-MCNC: 54 MG/DL (ref 70–99)
GLUCOSE BLDC GLUCOMTR-MCNC: 59 MG/DL (ref 70–99)
GLUCOSE BLDC GLUCOMTR-MCNC: 83 MG/DL (ref 70–99)
GLUCOSE BLDC GLUCOMTR-MCNC: 87 MG/DL (ref 70–99)
GLUCOSE SERPL-MCNC: 155 MG/DL (ref 70–99)
GLUCOSE SERPL-MCNC: 81 MG/DL (ref 70–99)
GLUCOSE SERPL-MCNC: 92 MG/DL (ref 70–99)
HCO3 BLD-SCNC: 21 MMOL/L (ref 21–28)
HCO3 BLD-SCNC: 22 MMOL/L (ref 21–28)
HCO3 BLD-SCNC: 23 MMOL/L (ref 21–28)
HCO3 SERPL-SCNC: 20 MMOL/L (ref 22–29)
HCO3 SERPL-SCNC: 21 MMOL/L (ref 22–29)
HCO3 SERPL-SCNC: 24 MMOL/L (ref 22–29)
HCT VFR BLD AUTO: 26.1 % (ref 40–53)
HCT VFR BLD AUTO: 28 % (ref 40–53)
HGB BLD-MCNC: 8.6 G/DL (ref 13.3–17.7)
HGB BLD-MCNC: 9.1 G/DL (ref 13.3–17.7)
HOLD SPECIMEN: NORMAL
HOLD SPECIMEN: NORMAL
IMM GRANULOCYTES # BLD: 0.1 10E3/UL
IMM GRANULOCYTES # BLD: 0.2 10E3/UL
IMM GRANULOCYTES NFR BLD: 1 %
IMM GRANULOCYTES NFR BLD: 1 %
INR PPP: 1.77 (ref 0.85–1.15)
INTERPRETATION ECG - MUSE: NORMAL
ISSUE DATE AND TIME: NORMAL
ISSUE DATE AND TIME: NORMAL
LACTATE SERPL-SCNC: 1.3 MMOL/L (ref 0.7–2)
LYMPHOCYTES # BLD AUTO: 0.6 10E3/UL (ref 0.8–5.3)
LYMPHOCYTES # BLD AUTO: 1.4 10E3/UL (ref 0.8–5.3)
LYMPHOCYTES NFR BLD AUTO: 4 %
LYMPHOCYTES NFR BLD AUTO: 9 %
MAGNESIUM SERPL-MCNC: 1.6 MG/DL (ref 1.7–2.3)
MAGNESIUM SERPL-MCNC: 1.6 MG/DL (ref 1.7–2.3)
MCH RBC QN AUTO: 30.6 PG (ref 26.5–33)
MCH RBC QN AUTO: 31.3 PG (ref 26.5–33)
MCHC RBC AUTO-ENTMCNC: 32.5 G/DL (ref 31.5–36.5)
MCHC RBC AUTO-ENTMCNC: 33 G/DL (ref 31.5–36.5)
MCV RBC AUTO: 94 FL (ref 78–100)
MCV RBC AUTO: 95 FL (ref 78–100)
MONOCYTES # BLD AUTO: 0.8 10E3/UL (ref 0–1.3)
MONOCYTES # BLD AUTO: 0.9 10E3/UL (ref 0–1.3)
MONOCYTES NFR BLD AUTO: 5 %
MONOCYTES NFR BLD AUTO: 6 %
NEUTROPHILS # BLD AUTO: 11.9 10E3/UL (ref 1.6–8.3)
NEUTROPHILS # BLD AUTO: 13.6 10E3/UL (ref 1.6–8.3)
NEUTROPHILS NFR BLD AUTO: 82 %
NEUTROPHILS NFR BLD AUTO: 89 %
NRBC # BLD AUTO: 0 10E3/UL
NRBC # BLD AUTO: 0 10E3/UL
NRBC BLD AUTO-RTO: 0 /100
NRBC BLD AUTO-RTO: 0 /100
O2/TOTAL GAS SETTING VFR VENT: 50 %
O2/TOTAL GAS SETTING VFR VENT: 60 %
O2/TOTAL GAS SETTING VFR VENT: ABNORMAL %
OXYHGB MFR BLDA: 97 % (ref 92–100)
OXYHGB MFR BLDA: 97 % (ref 92–100)
OXYHGB MFR BLDA: 98 % (ref 92–100)
P AXIS - MUSE: NORMAL DEGREES
PCO2 BLD: 35 MM HG (ref 35–45)
PCO2 BLD: 36 MM HG (ref 35–45)
PCO2 BLD: 39 MM HG (ref 35–45)
PH BLD: 7.35 [PH] (ref 7.35–7.45)
PH BLD: 7.4 [PH] (ref 7.35–7.45)
PH BLD: 7.42 [PH] (ref 7.35–7.45)
PHOSPHATE SERPL-MCNC: 6.1 MG/DL (ref 2.5–4.5)
PHOSPHATE SERPL-MCNC: 6.5 MG/DL (ref 2.5–4.5)
PLATELET # BLD AUTO: 314 10E3/UL (ref 150–450)
PLATELET # BLD AUTO: 330 10E3/UL (ref 150–450)
PO2 BLD: 106 MM HG (ref 80–105)
PO2 BLD: 132 MM HG (ref 80–105)
PO2 BLD: 190 MM HG (ref 80–105)
POTASSIUM SERPL-SCNC: 4.3 MMOL/L (ref 3.4–5.3)
POTASSIUM SERPL-SCNC: 4.5 MMOL/L (ref 3.4–5.3)
POTASSIUM SERPL-SCNC: 4.8 MMOL/L (ref 3.4–5.3)
PR INTERVAL - MUSE: 168 MS
PROT SERPL-MCNC: 5.3 G/DL (ref 6.4–8.3)
QRS DURATION - MUSE: 100 MS
QT - MUSE: 352 MS
QTC - MUSE: 485 MS
R AXIS - MUSE: -88 DEGREES
RBC # BLD AUTO: 2.75 10E6/UL (ref 4.4–5.9)
RBC # BLD AUTO: 2.97 10E6/UL (ref 4.4–5.9)
SAO2 % BLDA: 98.9 % (ref 96–97)
SAO2 % BLDA: 99.6 % (ref 96–97)
SAO2 % BLDA: 99.7 % (ref 96–97)
SODIUM SERPL-SCNC: 133 MMOL/L (ref 135–145)
SODIUM SERPL-SCNC: 135 MMOL/L (ref 135–145)
SODIUM SERPL-SCNC: 135 MMOL/L (ref 135–145)
SPECIMEN EXP DATE BLD: NORMAL
SYSTOLIC BLOOD PRESSURE - MUSE: NORMAL MMHG
T AXIS - MUSE: 89 DEGREES
UFH PPP CHRO-ACNC: 0.21 IU/ML
UFH PPP CHRO-ACNC: <0.1 IU/ML
UNIT ABO/RH: NORMAL
UNIT ABO/RH: NORMAL
UNIT NUMBER: NORMAL
UNIT NUMBER: NORMAL
UNIT STATUS: NORMAL
UNIT STATUS: NORMAL
UNIT TYPE ISBT: 5100
UNIT TYPE ISBT: 5100
VENTRICULAR RATE- MUSE: 114 BPM
WBC # BLD AUTO: 14.4 10E3/UL (ref 4–11)
WBC # BLD AUTO: 15.3 10E3/UL (ref 4–11)

## 2025-01-27 PROCEDURE — 82805 BLOOD GASES W/O2 SATURATION: CPT

## 2025-01-27 PROCEDURE — 250N000013 HC RX MED GY IP 250 OP 250 PS 637: Performed by: STUDENT IN AN ORGANIZED HEALTH CARE EDUCATION/TRAINING PROGRAM

## 2025-01-27 PROCEDURE — 86900 BLOOD TYPING SEROLOGIC ABO: CPT | Performed by: ANESTHESIOLOGY

## 2025-01-27 PROCEDURE — 999N000065 XR ABDOMEN PORT 1 VIEW

## 2025-01-27 PROCEDURE — 999N000141 HC STATISTIC PRE-PROCEDURE NURSING ASSESSMENT: Performed by: SURGERY

## 2025-01-27 PROCEDURE — 250N000011 HC RX IP 250 OP 636: Performed by: STUDENT IN AN ORGANIZED HEALTH CARE EDUCATION/TRAINING PROGRAM

## 2025-01-27 PROCEDURE — 36415 COLL VENOUS BLD VENIPUNCTURE: CPT

## 2025-01-27 PROCEDURE — 86850 RBC ANTIBODY SCREEN: CPT | Performed by: ANESTHESIOLOGY

## 2025-01-27 PROCEDURE — 85018 HEMOGLOBIN: CPT | Performed by: STUDENT IN AN ORGANIZED HEALTH CARE EDUCATION/TRAINING PROGRAM

## 2025-01-27 PROCEDURE — 71045 X-RAY EXAM CHEST 1 VIEW: CPT

## 2025-01-27 PROCEDURE — 041K0JL BYPASS RIGHT FEMORAL ARTERY TO POPLITEAL ARTERY WITH SYNTHETIC SUBSTITUTE, OPEN APPROACH: ICD-10-PCS | Performed by: SURGERY

## 2025-01-27 PROCEDURE — 99233 SBSQ HOSP IP/OBS HIGH 50: CPT | Performed by: HOSPITALIST

## 2025-01-27 PROCEDURE — 04CK0ZZ EXTIRPATION OF MATTER FROM RIGHT FEMORAL ARTERY, OPEN APPROACH: ICD-10-PCS | Performed by: SURGERY

## 2025-01-27 PROCEDURE — 85004 AUTOMATED DIFF WBC COUNT: CPT | Performed by: STUDENT IN AN ORGANIZED HEALTH CARE EDUCATION/TRAINING PROGRAM

## 2025-01-27 PROCEDURE — 83605 ASSAY OF LACTIC ACID: CPT

## 2025-01-27 PROCEDURE — 3E043XZ INTRODUCTION OF VASOPRESSOR INTO CENTRAL VEIN, PERCUTANEOUS APPROACH: ICD-10-PCS | Performed by: SURGERY

## 2025-01-27 PROCEDURE — 86923 COMPATIBILITY TEST ELECTRIC: CPT | Performed by: ANESTHESIOLOGY

## 2025-01-27 PROCEDURE — 90945 DIALYSIS ONE EVALUATION: CPT | Performed by: INTERNAL MEDICINE

## 2025-01-27 PROCEDURE — 250N000011 HC RX IP 250 OP 636

## 2025-01-27 PROCEDURE — 250N000013 HC RX MED GY IP 250 OP 250 PS 637

## 2025-01-27 PROCEDURE — 04CM0ZZ EXTIRPATION OF MATTER FROM RIGHT POPLITEAL ARTERY, OPEN APPROACH: ICD-10-PCS | Performed by: SURGERY

## 2025-01-27 PROCEDURE — 255N000002 HC RX 255 OP 636: Performed by: SURGERY

## 2025-01-27 PROCEDURE — 041K09L BYPASS RIGHT FEMORAL ARTERY TO POPLITEAL ARTERY WITH AUTOLOGOUS VENOUS TISSUE, OPEN APPROACH: ICD-10-PCS | Performed by: SURGERY

## 2025-01-27 PROCEDURE — 83735 ASSAY OF MAGNESIUM: CPT

## 2025-01-27 PROCEDURE — 35556 ART BYP GRFT FEM-POPLITEAL: CPT | Mod: RT | Performed by: SURGERY

## 2025-01-27 PROCEDURE — 36415 COLL VENOUS BLD VENIPUNCTURE: CPT | Performed by: ANESTHESIOLOGY

## 2025-01-27 PROCEDURE — 36415 COLL VENOUS BLD VENIPUNCTURE: CPT | Performed by: INTERNAL MEDICINE

## 2025-01-27 PROCEDURE — 80048 BASIC METABOLIC PNL TOTAL CA: CPT

## 2025-01-27 PROCEDURE — C1768 GRAFT, VASCULAR: HCPCS | Performed by: SURGERY

## 2025-01-27 PROCEDURE — 85730 THROMBOPLASTIN TIME PARTIAL: CPT

## 2025-01-27 PROCEDURE — 250N000011 HC RX IP 250 OP 636: Performed by: SURGERY

## 2025-01-27 PROCEDURE — 258N000003 HC RX IP 258 OP 636: Performed by: ANESTHESIOLOGY

## 2025-01-27 PROCEDURE — 82040 ASSAY OF SERUM ALBUMIN: CPT

## 2025-01-27 PROCEDURE — 250N000009 HC RX 250: Performed by: SURGERY

## 2025-01-27 PROCEDURE — 85014 HEMATOCRIT: CPT

## 2025-01-27 PROCEDURE — 258N000003 HC RX IP 258 OP 636

## 2025-01-27 PROCEDURE — P9045 ALBUMIN (HUMAN), 5%, 250 ML: HCPCS

## 2025-01-27 PROCEDURE — 35681 COMPOSITE BYP GRFT PROS&VEIN: CPT | Mod: RT | Performed by: SURGERY

## 2025-01-27 PROCEDURE — 999N000179 XR SURGERY CARM FLUORO LESS THAN 5 MIN W STILLS

## 2025-01-27 PROCEDURE — 94002 VENT MGMT INPAT INIT DAY: CPT

## 2025-01-27 PROCEDURE — 82805 BLOOD GASES W/O2 SATURATION: CPT | Performed by: SURGERY

## 2025-01-27 PROCEDURE — 82310 ASSAY OF CALCIUM: CPT

## 2025-01-27 PROCEDURE — 999N000157 HC STATISTIC RCP TIME EA 10 MIN

## 2025-01-27 PROCEDURE — 80048 BASIC METABOLIC PNL TOTAL CA: CPT | Performed by: ANESTHESIOLOGY

## 2025-01-27 PROCEDURE — 200N000001 HC R&B ICU

## 2025-01-27 PROCEDURE — 80053 COMPREHEN METABOLIC PANEL: CPT | Performed by: ANESTHESIOLOGY

## 2025-01-27 PROCEDURE — 258N000003 HC RX IP 258 OP 636: Performed by: HOSPITALIST

## 2025-01-27 PROCEDURE — 84100 ASSAY OF PHOSPHORUS: CPT

## 2025-01-27 PROCEDURE — 85004 AUTOMATED DIFF WBC COUNT: CPT

## 2025-01-27 PROCEDURE — 272N000001 HC OR GENERAL SUPPLY STERILE: Performed by: SURGERY

## 2025-01-27 PROCEDURE — 90945 DIALYSIS ONE EVALUATION: CPT

## 2025-01-27 PROCEDURE — 370N000017 HC ANESTHESIA TECHNICAL FEE, PER MIN: Performed by: SURGERY

## 2025-01-27 PROCEDURE — 87040 BLOOD CULTURE FOR BACTERIA: CPT

## 2025-01-27 PROCEDURE — 250N000009 HC RX 250

## 2025-01-27 PROCEDURE — 99222 1ST HOSP IP/OBS MODERATE 55: CPT | Performed by: REGISTERED NURSE

## 2025-01-27 PROCEDURE — 85610 PROTHROMBIN TIME: CPT

## 2025-01-27 PROCEDURE — 85520 HEPARIN ASSAY: CPT | Performed by: INTERNAL MEDICINE

## 2025-01-27 PROCEDURE — 360N000082 HC SURGERY LEVEL 2 W/ FLUORO, PER MIN: Performed by: SURGERY

## 2025-01-27 PROCEDURE — 250N000025 HC SEVOFLURANE, PER MIN: Performed by: SURGERY

## 2025-01-27 PROCEDURE — P9016 RBC LEUKOCYTES REDUCED: HCPCS | Performed by: ANESTHESIOLOGY

## 2025-01-27 PROCEDURE — 999N000065 XR CHEST PORT 1 VIEW

## 2025-01-27 PROCEDURE — 06BP0ZZ EXCISION OF RIGHT SAPHENOUS VEIN, OPEN APPROACH: ICD-10-PCS | Performed by: SURGERY

## 2025-01-27 DEVICE — PROPATEN VASCULAR GRAFT TW RR 6MMX70CM 60CM RINGS HEPARIN
Type: IMPLANTABLE DEVICE | Site: LEG | Status: FUNCTIONAL
Brand: GORE PROPATEN VASCULAR GRAFT

## 2025-01-27 RX ORDER — NOREPINEPHRINE BITARTRATE 0.02 MG/ML
INJECTION, SOLUTION INTRAVENOUS CONTINUOUS PRN
Status: DISCONTINUED | OUTPATIENT
Start: 2025-01-27 | End: 2025-01-27

## 2025-01-27 RX ORDER — GENTAMICIN SULFATE 1 MG/G
CREAM TOPICAL DAILY PRN
Status: DISCONTINUED | OUTPATIENT
Start: 2025-01-27 | End: 2025-02-01

## 2025-01-27 RX ORDER — LIDOCAINE HYDROCHLORIDE 20 MG/ML
INJECTION, SOLUTION INFILTRATION; PERINEURAL PRN
Status: DISCONTINUED | OUTPATIENT
Start: 2025-01-27 | End: 2025-01-27

## 2025-01-27 RX ORDER — PROPOFOL 10 MG/ML
INJECTION, EMULSION INTRAVENOUS PRN
Status: DISCONTINUED | OUTPATIENT
Start: 2025-01-27 | End: 2025-01-27

## 2025-01-27 RX ORDER — METHOCARBAMOL 750 MG/1
750 TABLET, FILM COATED ORAL 4 TIMES DAILY
Status: CANCELLED | OUTPATIENT
Start: 2025-01-28

## 2025-01-27 RX ORDER — NICOTINE POLACRILEX 4 MG
15-30 LOZENGE BUCCAL
Status: CANCELLED | OUTPATIENT
Start: 2025-01-27

## 2025-01-27 RX ORDER — CHLORHEXIDINE GLUCONATE ORAL RINSE 1.2 MG/ML
15 SOLUTION DENTAL EVERY 12 HOURS
Status: DISCONTINUED | OUTPATIENT
Start: 2025-01-27 | End: 2025-01-29

## 2025-01-27 RX ORDER — DEXTROSE MONOHYDRATE 25 G/50ML
25-50 INJECTION, SOLUTION INTRAVENOUS
Status: CANCELLED | OUTPATIENT
Start: 2025-01-27

## 2025-01-27 RX ORDER — AMOXICILLIN 250 MG
1 CAPSULE ORAL 2 TIMES DAILY
Status: DISCONTINUED | OUTPATIENT
Start: 2025-01-27 | End: 2025-02-11

## 2025-01-27 RX ORDER — ACETAMINOPHEN 325 MG/1
650 TABLET ORAL EVERY 4 HOURS PRN
Status: DISCONTINUED | OUTPATIENT
Start: 2025-01-27 | End: 2025-03-01 | Stop reason: HOSPADM

## 2025-01-27 RX ORDER — DEXTROSE MONOHYDRATE AND SODIUM CHLORIDE 5; .9 G/100ML; G/100ML
INJECTION, SOLUTION INTRAVENOUS CONTINUOUS
Status: ACTIVE | OUTPATIENT
Start: 2025-01-27 | End: 2025-01-28

## 2025-01-27 RX ORDER — SODIUM CHLORIDE 9 MG/ML
INJECTION, SOLUTION INTRAVENOUS CONTINUOUS PRN
Status: DISCONTINUED | OUTPATIENT
Start: 2025-01-27 | End: 2025-01-27

## 2025-01-27 RX ORDER — HEPARIN SODIUM 10000 [USP'U]/100ML
500 INJECTION, SOLUTION INTRAVENOUS CONTINUOUS
Status: DISCONTINUED | OUTPATIENT
Start: 2025-01-28 | End: 2025-01-28

## 2025-01-27 RX ORDER — PROPOFOL 10 MG/ML
5-75 INJECTION, EMULSION INTRAVENOUS CONTINUOUS
Status: DISCONTINUED | OUTPATIENT
Start: 2025-01-27 | End: 2025-01-30

## 2025-01-27 RX ORDER — HEPARIN SODIUM 1000 [USP'U]/ML
INJECTION, SOLUTION INTRAVENOUS; SUBCUTANEOUS PRN
Status: DISCONTINUED | OUTPATIENT
Start: 2025-01-27 | End: 2025-01-27

## 2025-01-27 RX ORDER — ACETAMINOPHEN 325 MG/10.15ML
975 LIQUID ORAL EVERY 8 HOURS
Status: DISCONTINUED | OUTPATIENT
Start: 2025-01-27 | End: 2025-01-29

## 2025-01-27 RX ORDER — POLYETHYLENE GLYCOL 3350 17 G/17G
17 POWDER, FOR SOLUTION ORAL DAILY
Status: DISCONTINUED | OUTPATIENT
Start: 2025-01-28 | End: 2025-02-19

## 2025-01-27 RX ORDER — ONDANSETRON 4 MG/1
4 TABLET, ORALLY DISINTEGRATING ORAL EVERY 6 HOURS PRN
Status: CANCELLED | OUTPATIENT
Start: 2025-01-27

## 2025-01-27 RX ORDER — ALLOPURINOL 100 MG/1
200 TABLET ORAL EVERY EVENING
Status: CANCELLED | OUTPATIENT
Start: 2025-01-28

## 2025-01-27 RX ORDER — EPINEPHRINE IN 0.9 % SOD CHLOR 5 MG/250ML
.01-.3 PLASTIC BAG, INJECTION (ML) INTRAVENOUS CONTINUOUS
Status: DISCONTINUED | OUTPATIENT
Start: 2025-01-27 | End: 2025-01-31

## 2025-01-27 RX ORDER — PROTAMINE SULFATE 10 MG/ML
INJECTION, SOLUTION INTRAVENOUS PRN
Status: DISCONTINUED | OUTPATIENT
Start: 2025-01-27 | End: 2025-01-27

## 2025-01-27 RX ORDER — SODIUM CHLORIDE 9 MG/ML
INJECTION, SOLUTION INTRAVENOUS CONTINUOUS
Status: DISCONTINUED | OUTPATIENT
Start: 2025-01-27 | End: 2025-01-27 | Stop reason: HOSPADM

## 2025-01-27 RX ORDER — MAGNESIUM HYDROXIDE 1200 MG/15ML
LIQUID ORAL PRN
Status: DISCONTINUED | OUTPATIENT
Start: 2025-01-27 | End: 2025-01-27 | Stop reason: HOSPADM

## 2025-01-27 RX ORDER — CEFAZOLIN SODIUM 1 G/3ML
1 INJECTION, POWDER, FOR SOLUTION INTRAMUSCULAR; INTRAVENOUS EVERY 24 HOURS
Status: COMPLETED | OUTPATIENT
Start: 2025-01-27 | End: 2025-01-27

## 2025-01-27 RX ORDER — ONDANSETRON 2 MG/ML
INJECTION INTRAMUSCULAR; INTRAVENOUS PRN
Status: DISCONTINUED | OUTPATIENT
Start: 2025-01-27 | End: 2025-01-27

## 2025-01-27 RX ORDER — IOPAMIDOL 612 MG/ML
INJECTION, SOLUTION INTRAVASCULAR PRN
Status: DISCONTINUED | OUTPATIENT
Start: 2025-01-27 | End: 2025-01-27 | Stop reason: HOSPADM

## 2025-01-27 RX ORDER — FENTANYL CITRATE 50 UG/ML
INJECTION, SOLUTION INTRAMUSCULAR; INTRAVENOUS PRN
Status: DISCONTINUED | OUTPATIENT
Start: 2025-01-27 | End: 2025-01-27

## 2025-01-27 RX ORDER — ONDANSETRON 2 MG/ML
4 INJECTION INTRAMUSCULAR; INTRAVENOUS EVERY 6 HOURS PRN
Status: CANCELLED | OUTPATIENT
Start: 2025-01-27

## 2025-01-27 RX ORDER — PIPERACILLIN SODIUM, TAZOBACTAM SODIUM 2; .25 G/10ML; G/10ML
2.25 INJECTION, POWDER, LYOPHILIZED, FOR SOLUTION INTRAVENOUS EVERY 6 HOURS
Status: CANCELLED | OUTPATIENT
Start: 2025-01-27 | End: 2025-02-06

## 2025-01-27 RX ORDER — PROPOFOL 10 MG/ML
INJECTION, EMULSION INTRAVENOUS CONTINUOUS PRN
Status: DISCONTINUED | OUTPATIENT
Start: 2025-01-27 | End: 2025-01-27

## 2025-01-27 RX ORDER — HYDRALAZINE HYDROCHLORIDE 20 MG/ML
20 INJECTION INTRAMUSCULAR; INTRAVENOUS EVERY 4 HOURS PRN
Status: DISCONTINUED | OUTPATIENT
Start: 2025-01-27 | End: 2025-01-31

## 2025-01-27 RX ORDER — ACETAMINOPHEN 325 MG/10.15ML
650 LIQUID ORAL EVERY 4 HOURS PRN
Status: DISCONTINUED | OUTPATIENT
Start: 2025-01-27 | End: 2025-03-01 | Stop reason: HOSPADM

## 2025-01-27 RX ORDER — BUPIVACAINE HYDROCHLORIDE 5 MG/ML
INJECTION, SOLUTION PERINEURAL PRN
Status: DISCONTINUED | OUTPATIENT
Start: 2025-01-27 | End: 2025-01-27 | Stop reason: HOSPADM

## 2025-01-27 RX ORDER — LABETALOL HYDROCHLORIDE 5 MG/ML
10 INJECTION, SOLUTION INTRAVENOUS EVERY 4 HOURS PRN
Status: DISCONTINUED | OUTPATIENT
Start: 2025-01-27 | End: 2025-01-31

## 2025-01-27 RX ORDER — AMOXICILLIN 250 MG
1 CAPSULE ORAL 2 TIMES DAILY PRN
Status: DISCONTINUED | OUTPATIENT
Start: 2025-01-27 | End: 2025-03-01 | Stop reason: HOSPADM

## 2025-01-27 RX ORDER — LIDOCAINE 40 MG/G
CREAM TOPICAL
Status: DISCONTINUED | OUTPATIENT
Start: 2025-01-27 | End: 2025-02-19

## 2025-01-27 RX ORDER — NOREPINEPHRINE BITARTRATE 0.02 MG/ML
.01-.6 INJECTION, SOLUTION INTRAVENOUS CONTINUOUS
Status: DISCONTINUED | OUTPATIENT
Start: 2025-01-27 | End: 2025-01-30

## 2025-01-27 RX ORDER — POLYETHYLENE GLYCOL 3350 17 G/17G
17 POWDER, FOR SOLUTION ORAL DAILY PRN
Status: DISCONTINUED | OUTPATIENT
Start: 2025-01-27 | End: 2025-03-01 | Stop reason: HOSPADM

## 2025-01-27 RX ORDER — VASOPRESSIN IN 0.9 % NACL 2 UNIT/2ML
SYRINGE (ML) INTRAVENOUS PRN
Status: DISCONTINUED | OUTPATIENT
Start: 2025-01-27 | End: 2025-01-27

## 2025-01-27 RX ORDER — AMOXICILLIN 250 MG
2 CAPSULE ORAL 2 TIMES DAILY PRN
Status: DISCONTINUED | OUTPATIENT
Start: 2025-01-27 | End: 2025-03-01 | Stop reason: HOSPADM

## 2025-01-27 RX ORDER — BISACODYL 10 MG
10 SUPPOSITORY, RECTAL RECTAL DAILY PRN
Status: DISCONTINUED | OUTPATIENT
Start: 2025-01-30 | End: 2025-02-19

## 2025-01-27 RX ORDER — METHOCARBAMOL 750 MG/1
750 TABLET, FILM COATED ORAL 4 TIMES DAILY
Status: DISCONTINUED | OUTPATIENT
Start: 2025-01-28 | End: 2025-01-30

## 2025-01-27 RX ADMIN — NOREPINEPHRINE BITARTRATE 6.4 MCG: 1 INJECTION, SOLUTION, CONCENTRATE INTRAVENOUS at 18:41

## 2025-01-27 RX ADMIN — PROPOFOL 50 MG: 10 INJECTION, EMULSION INTRAVENOUS at 17:14

## 2025-01-27 RX ADMIN — ALBUMIN (HUMAN): 12.5 SOLUTION INTRAVENOUS at 18:41

## 2025-01-27 RX ADMIN — EPINEPHRINE 0.02 MCG/KG/MIN: 1 INJECTION INTRAMUSCULAR; INTRAVENOUS; SUBCUTANEOUS at 17:50

## 2025-01-27 RX ADMIN — SODIUM CHLORIDE: 9 INJECTION, SOLUTION INTRAVENOUS at 16:30

## 2025-01-27 RX ADMIN — NOREPINEPHRINE BITARTRATE 6.4 MCG: 1 INJECTION, SOLUTION, CONCENTRATE INTRAVENOUS at 21:08

## 2025-01-27 RX ADMIN — PHENYLEPHRINE HYDROCHLORIDE 300 MCG: 10 INJECTION INTRAVENOUS at 17:15

## 2025-01-27 RX ADMIN — ROCURONIUM BROMIDE 50 MG: 50 INJECTION, SOLUTION INTRAVENOUS at 17:14

## 2025-01-27 RX ADMIN — PANTOPRAZOLE SODIUM 40 MG: 20 TABLET, DELAYED RELEASE ORAL at 08:48

## 2025-01-27 RX ADMIN — PHENYLEPHRINE HYDROCHLORIDE 300 MCG: 10 INJECTION INTRAVENOUS at 17:14

## 2025-01-27 RX ADMIN — HEPARIN SODIUM 3000 UNITS: 1000 INJECTION, SOLUTION INTRAVENOUS; SUBCUTANEOUS at 19:36

## 2025-01-27 RX ADMIN — NOREPINEPHRINE BITARTRATE 3.2 MCG: 1 INJECTION, SOLUTION, CONCENTRATE INTRAVENOUS at 17:29

## 2025-01-27 RX ADMIN — ACETAMINOPHEN 975 MG: 325 SUSPENSION ORAL at 22:55

## 2025-01-27 RX ADMIN — METOPROLOL SUCCINATE 25 MG: 25 TABLET, EXTENDED RELEASE ORAL at 08:48

## 2025-01-27 RX ADMIN — ONDANSETRON 4 MG: 2 INJECTION INTRAMUSCULAR; INTRAVENOUS at 21:49

## 2025-01-27 RX ADMIN — PIPERACILLIN AND TAZOBACTAM 2.25 G: 2; .25 INJECTION, POWDER, FOR SOLUTION INTRAVENOUS at 08:48

## 2025-01-27 RX ADMIN — LIDOCAINE HYDROCHLORIDE 100 MG: 20 INJECTION, SOLUTION INFILTRATION; PERINEURAL at 17:14

## 2025-01-27 RX ADMIN — Medication 1 UNITS: at 17:43

## 2025-01-27 RX ADMIN — DEXTROSE 15 G: 15 GEL ORAL at 11:58

## 2025-01-27 RX ADMIN — FENTANYL CITRATE 100 MCG: 50 INJECTION INTRAMUSCULAR; INTRAVENOUS at 17:14

## 2025-01-27 RX ADMIN — HYDROMORPHONE HYDROCHLORIDE 0.25 MG: 1 INJECTION, SOLUTION INTRAMUSCULAR; INTRAVENOUS; SUBCUTANEOUS at 20:43

## 2025-01-27 RX ADMIN — SENNOSIDES AND DOCUSATE SODIUM 1 TABLET: 50; 8.6 TABLET ORAL at 22:58

## 2025-01-27 RX ADMIN — Medication 1 UNITS: at 17:50

## 2025-01-27 RX ADMIN — DEXTROSE AND SODIUM CHLORIDE: 5; 900 INJECTION, SOLUTION INTRAVENOUS at 13:31

## 2025-01-27 RX ADMIN — MIDAZOLAM 2 MG: 1 INJECTION INTRAMUSCULAR; INTRAVENOUS at 18:55

## 2025-01-27 RX ADMIN — NOREPINEPHRINE BITARTRATE 6.4 MCG: 1 INJECTION, SOLUTION, CONCENTRATE INTRAVENOUS at 18:10

## 2025-01-27 RX ADMIN — CHLORHEXIDINE GLUCONATE 15 ML: 1.2 SOLUTION ORAL at 22:51

## 2025-01-27 RX ADMIN — NOREPINEPHRINE BITARTRATE 6.4 MCG: 1 INJECTION, SOLUTION, CONCENTRATE INTRAVENOUS at 18:20

## 2025-01-27 RX ADMIN — PHENYLEPHRINE HYDROCHLORIDE 400 MCG: 10 INJECTION INTRAVENOUS at 17:16

## 2025-01-27 RX ADMIN — NOREPINEPHRINE BITARTRATE 6.4 MCG: 1 INJECTION, SOLUTION, CONCENTRATE INTRAVENOUS at 17:41

## 2025-01-27 RX ADMIN — PIPERACILLIN AND TAZOBACTAM 2.25 G: 2; .25 INJECTION, POWDER, FOR SOLUTION INTRAVENOUS at 17:50

## 2025-01-27 RX ADMIN — PROTAMINE SULFATE 40 MG: 10 INJECTION, SOLUTION INTRAVENOUS at 20:36

## 2025-01-27 RX ADMIN — HEPARIN SODIUM 1600 UNITS/HR: 10000 INJECTION, SOLUTION INTRAVENOUS at 06:46

## 2025-01-27 RX ADMIN — NOREPINEPHRINE BITARTRATE 0.03 MCG/KG/MIN: 0.02 INJECTION, SOLUTION INTRAVENOUS at 17:25

## 2025-01-27 RX ADMIN — SODIUM CHLORIDE: 9 INJECTION, SOLUTION INTRAVENOUS at 19:30

## 2025-01-27 RX ADMIN — VASOPRESSIN 2.4 UNITS/HR: 20 INJECTION, SOLUTION INTRAMUSCULAR; SUBCUTANEOUS at 18:31

## 2025-01-27 RX ADMIN — HEPARIN SODIUM 5000 UNITS: 1000 INJECTION, SOLUTION INTRAVENOUS; SUBCUTANEOUS at 18:35

## 2025-01-27 RX ADMIN — DEXTROSE 15 G: 15 GEL ORAL at 12:21

## 2025-01-27 RX ADMIN — PIPERACILLIN AND TAZOBACTAM 2.25 G: 2; .25 INJECTION, POWDER, FOR SOLUTION INTRAVENOUS at 00:30

## 2025-01-27 RX ADMIN — PROPOFOL 30 MCG/KG/MIN: 10 INJECTION, EMULSION INTRAVENOUS at 21:49

## 2025-01-27 RX ADMIN — CEFAZOLIN 1 G: 1 INJECTION, POWDER, FOR SOLUTION INTRAMUSCULAR; INTRAVENOUS at 22:52

## 2025-01-27 RX ADMIN — HYDROMORPHONE HYDROCHLORIDE 0.25 MG: 1 INJECTION, SOLUTION INTRAMUSCULAR; INTRAVENOUS; SUBCUTANEOUS at 20:58

## 2025-01-27 RX ADMIN — ROCURONIUM BROMIDE 10 MG: 50 INJECTION, SOLUTION INTRAVENOUS at 19:28

## 2025-01-27 RX ADMIN — Medication 1 CAPSULE: at 08:48

## 2025-01-27 RX ADMIN — NOREPINEPHRINE BITARTRATE 6.4 MCG: 1 INJECTION, SOLUTION, CONCENTRATE INTRAVENOUS at 17:37

## 2025-01-27 RX ADMIN — NOREPINEPHRINE BITARTRATE 6.4 MCG: 1 INJECTION, SOLUTION, CONCENTRATE INTRAVENOUS at 19:14

## 2025-01-27 RX ADMIN — ROCURONIUM BROMIDE 10 MG: 50 INJECTION, SOLUTION INTRAVENOUS at 21:31

## 2025-01-27 RX ADMIN — NOREPINEPHRINE BITARTRATE 3.2 MCG: 1 INJECTION, SOLUTION, CONCENTRATE INTRAVENOUS at 17:31

## 2025-01-27 RX ADMIN — Medication 2 UNITS: at 17:17

## 2025-01-27 RX ADMIN — ALBUMIN (HUMAN): 12.5 SOLUTION INTRAVENOUS at 17:44

## 2025-01-27 ASSESSMENT — ACTIVITIES OF DAILY LIVING (ADL)
ADLS_ACUITY_SCORE: 57

## 2025-01-27 ASSESSMENT — LIFESTYLE VARIABLES: TOBACCO_USE: 1

## 2025-01-27 NOTE — PROGRESS NOTES
Vascular Surgery Progress Note  01/27/2025       Subjective:  - NAEON. Patient had increased pain in the foot yesterday but it appears to have improved today. I called his son Ervin Pritchett this morning to discuss our operative plan and he is in agreement with this. All questions were answered.     Objective:  Temp:  [97.3  F (36.3  C)-97.8  F (36.6  C)] 97.8  F (36.6  C)  Pulse:  [104-110] 110  Resp:  [16-18] 16  BP: (128-131)/(89-97) 131/97  SpO2:  [97 %-98 %] 98 %    I/O last 3 completed shifts:  In: 240 [P.O.:240]  Out: 100 [Urine:100]      Gen: Awake, alert, NAD  Resp: NLB on NC     Labs:  Recent Labs   Lab 01/25/25  0735 01/24/25  0425 01/22/25  1451   WBC 15.2* 12.6* 13.6*   HGB 9.8* 10.4* 12.2*    409 457*       Recent Labs   Lab 01/26/25  2102 01/26/25  1755 01/26/25  1613 01/24/25  0838 01/24/25  0425 01/22/25  0952 01/22/25  0733 01/22/25  0228 01/21/25  1606   NA  --   --   --   --  133*  --  131*  --  130*   POTASSIUM  --   --   --   --  4.5  --  4.2  --  4.3   CHLORIDE  --   --   --   --  93*  --  93*  --  91*   CO2  --   --   --   --  26 -- 25 -- 25   BUN  --   --   --   --  52.6*  --  54.0*  --  52.4*   CR  --   --   --   --  7.52*  --  7.65*  --  7.03*   * 122* 146*   < > 257*   < > 129*   < > 170*   TERENCE  --   --   --   --  9.3  --  9.8  --  10.4    < > = values in this interval not displayed.       Imaging:  MR Ankle Right w/o Contrast   Final Result   IMPRESSION:   1.  There is a soft tissue ulceration over the posterior aspect of the hindfoot with some surrounding edema or cellulitis but no evidence for abscess.   2.  No evidence for osteomyelitis.   3.  Small tibiotalar joint effusion.   4.  No evidence for fracture.   5.  Acute on chronic plantar fasciitis. Plantar calcaneal spurring.   6.  No additional tendinous or ligamentous pathology.            US Thoracentesis   Final Result   IMPRESSION:   Status post right ultrasound-guided thoracentesis.         IR Lower Extremity  Angiogram Right   Final Result      CTA Abdomen Pelvis Runoff w Contrast   Final Result   IMPRESSION:   1.  Occluded distal right superficial femoral artery and an popliteal artery as above including stents in the SFA and popliteal arteries. The tibial arteries are difficult to evaluate due to poor flow and calcified plaque. The distal anterior tibial and    peroneal arteries are patent.   2.  Patent left common femoral artery and common femoral artery to tibioperoneal trunk bypass graft without evidence for significant stenosis.   3.  Large right pleural effusion and small left pleural effusion.   4.  Ascites most likely relates to a peritoneal dialysis catheter.   5.  Colonic diverticulosis.      XR Calcaneus Right G/E 2 Views   Final Result   IMPRESSION: Soft tissue ulcer plantar to the calcaneus. There is diffuse demineralization without discrete erosion. MRI would be more sensitive for early findings of osteomyelitis. Plantar calcaneal spur. Achilles enthesophyte. Vascular calcifications.      US Lower Extremity Venous Mapping Right   Final Result   IMPRESSION:   1.  Right great saphenous vein measurements as described, relatively small diameters. Areas of echogenic walls, may relate to chronic superficial thrombophlebitis.      US Upper Extremity Venous Mapping Bilateral   Final Result   IMPRESSION: Bilateral upper extremity vein measurements as described. Both cephalic veins are relatively small in caliber.         US Lower Extremity Arterial Duplex Right   Final Result   IMPRESSION:   1.  No blood flow demonstrated in the distal superficial femoral, popliteal, and runoff arteries. There may be trickle blood flow in the distal runoff arteries, though difficult to distinguish from artifact.      2.  Uncertain if stent is in the right lower extremity. Previous angiogram images are not available for review.              Assessment/Plan:   64 year old male with RLE rest pain and dry gangrene of the heel who is  planned to undergo R fem-BK pop bypass with PTFE, angiogram, and possible debridement of R heel this afternoon.    - NPO for surgery this afternoon  - Will hold heparin gtt 2 hours prior to OR   - Palliative care consult placed at the request of family  - Appreciate hospitalist input for medical optimization     Seen, examined, and discussed with staff.  - - - - - - - - - - - - - - - - - -  Milo Carrion MD  Vascular Surgery Resident

## 2025-01-27 NOTE — CONSULTS
Palliative Care Consultation Note  [facility]      Patient: Arnulfo Pritchett  Date of Admission:  1/21/2025    Requesting Clinician / Team: Dr Griffin/Medicine  Reason for consult: Goals of care  Patient and family support     Recommendations & Counseling     GOALS OF CARE:   Restorative without limits   Patient will be undergoing vascular surgery today.  Family would like to meet for a goals of care discussion on Wednesday at 10 AM.  Family states that patient has been declining significantly over the past year.  He is currently living in a 55+ apartment with occasional home health nurse check-in's.  They would like to have a general goals of care discussion to obtain input from patient regarding plans if his condition should continue to worsen in the future.    ADVANCE CARE PLANNING:  No health care directive on file. Per system policy, Surrogate Decision-makers for Patients With Diminished Decision-making Capacity offers guidance on possible decision-makers.  Keagan Mueller has been identified as a surrogate decision maker.   There is no POLST form on file, defer to patient and/or next of kin for decisions   Code status: Full Code    MEDICAL MANAGEMENT:   We are not actively managing symptoms at this time.    High Risk for Delirium- multifactorial, ESRD, frail, surgery,  medications, environment.  -Maintain day/night routines and orientation.  -Avoid medications such as steroids, benzodiazepines and anticholinergics.   -Optimize hydration/nutrition, and sleep.  -Utilize sensory aids to maintain orientation such as eye glasses, hearing aids or pocket talkers.  -Calm environment, quiet/reassuring voices, orienting cues, minimized noise/night disruptions, when possible.   -Maximize mobility and prevent/treat pain.    PSYCHOSOCIAL/SPIRITUAL SUPPORT:  Family keagan Mueller and his wife.  There are 2 other siblings.    Palliative Care will continue to follow. Thank you for the consult and allowing us to  "aid in the care of Arnulfo Pritchett.    Reviewed patient case with hospitalist Dr Schwarz, patient's RN,     Coby Stone, CNS  Securely message with myde[employer] (more info)  Text page via [employer] Paging/Directory   \"Palliative Care [facility]\"     Palliative Summary/HPI     Arnulfo Pritchett is a 64 year old male with a past medical history of renal cell carcinoma s/p right nephrectomy, ESRD on peritoneal dialysis, gout, peripheral artery disease with critical resting limb arterial insufficiency of right LE,  left common femoral endarterectomy and a left common femoral artery to tibioperoneal trunk bypass  10/2024, chronic paroxysmal A-fib, CAD s/p multiple stents, anemia, TALI, and recent pneumonia who presented to [facility] ED on 1/21/2025 with leg pain and shortness of breath.      Upon further evaluation he was found to have a moderate to large right pleural effusion s/p thoracentesis 1/24, right leg and right heel gangrene/ulcer and occluded right SFA stent.  The plan is for patient to undergo R fem-BK pop bypass with PTFE, angiogram, and possible debridement of R heel this afternoon.     12/05-07: Hypotension.  11/27 - 12/03: NSTEMI versus nonischemic MRI due to/hypotension    Today, the patient was seen for:  Goals of care    Palliative Care Summary:   Met with patient in room and later spoke with son Ervin on phone.   Introduced the role of palliative care as an interdisciplinary team that cares for patients with serious illness to help support symptom management, communication, coping for patients and their families as well as support with medical decision making.    Prognosis, Goals, & Planning:    Functional Status just prior to this current hospitalization:  has been hospitalized 2 times in the past 3 months for recurrent hypotension, PAD, pneumonia. Patient has moved to a 55+ apartment with homecare support from for increased support. There has been a decline in daily function including weakness, " failure to thrive.   Outpatient Palliative Performance Score (PPS) 50%  Extensive disease. Normal or reduced intake; normal LOC or confusion; little ambulation (mainly sit/lie), ADLs w/much assistance, unable to do any work.    ECOG2 (Ambulatory and capable of all selfcare but unable to carry out any work activities; may need help with IADLs up and about > 50% of waking hours)    Prognosis, Goals, and/or Advance Care Planning:  We discussed general treatment options (full/restorative, selective/conservatives, and comfort only/hospice). We then discussed how these specifically apply to Arnulfo and his medical situation.  Discussed what continuing restorative/life-prolonging care entails, including continued (re)admissions to the hospital, continuing with preventative and primary care, seeing disease/organ specific specialty consultations for medical treatments in hopes to prolong life for as long as possible.  Education provided on transition to comfort-focused goals of care would be including discontinuation of IV fluids, cardiac monitoring, labs, and other interventions that do not directly promote comfort.  Education provided regarding hospice philosophy, prognostic,and eligibility criteria. Discussed what services are provided and those that are not,  Discussed common misconceptions. We explored the various disposition options where they can receive hospice care (home, residential hospice homes, LTC with hospice).  Family would like to meet with patient after his surgery to review goals of care with him and gain his input.  They have seen a marked decline in patient's medical status over the past year.  He has spent much time in hospitals, TCU's and has lived with family for 6 months over 2024.  They want guidance from patient as to what he would want if his condition should continue to worsen in the months ahead.  Will plan to meet with patient and family on Wednesday at 10 AM for goals of care discussion.    Code  Status was addressed today:   No      Patient's decision making preferences: shared with support from loved ones        Patient has decision-making capacity today for complex decisions:Questionable            Coping, Meaning, & Spirituality:   Mood, coping, and/or meaning in the context of serious illness were addressed today: Yes    Social:   Living situation:lives alone  Important relationships/caregivers:roxie Mueller    Medications:  I have reviewed this patient's medication profile and medications from this hospitalization. Notable medications:     Noted scheduled meds are:  Current Facility-Administered Medications   Medication Dose Route Frequency Provider Last Rate Last Admin    allopurinol (ZYLOPRIM) tablet 200 mg  200 mg Oral QPM Lizett Davis MD   200 mg at 01/26/25 1957    atorvastatin (LIPITOR) tablet 40 mg  40 mg Oral At Bedtime Lizett Davis MD   40 mg at 01/26/25 2207    calcitRIOL (ROCALTROL) capsule 0.25 mcg  0.25 mcg Oral At Bedtime Lizett Davis MD   0.25 mcg at 01/26/25 2207    cinacalcet (SENSIPAR) tablet 60 mg  60 mg Oral Q Mon Wed Fri AM Lizett Davis MD   60 mg at 01/24/25 2151    clopidogrel (PLAVIX) tablet 75 mg  75 mg Oral QPM Lizett Davis MD   75 mg at 01/26/25 1957    insulin aspart (NovoLOG) injection (RAPID ACTING)  1-7 Units Subcutaneous TID  Lizett Davis MD   1 Units at 01/26/25 1256    insulin aspart (NovoLOG) injection (RAPID ACTING)  1-5 Units Subcutaneous At Bedtime Lizett Davis MD   2 Units at 01/25/25 2221    insulin glargine (LANTUS PEN) injection 15 Units  15 Units Subcutaneous At Bedtime Lizett Davis MD   15 Units at 01/26/25 2208    metoprolol succinate ER (TOPROL-XL) 24 hr half-tab 25 mg  25 mg Oral Daily Lizett Davis MD   25 mg at 01/27/25 0848    multivitamin RENAL (TRIPHROCAPS) capsule 1 capsule  1 capsule Oral Daily Lizett Davis MD   1 capsule at 01/27/25 0848    pantoprazole (PROTONIX) EC tablet 40 mg  40 mg Oral QAM AC Lizett Davis MD   40 mg at  01/27/25 0848    piperacillin-tazobactam (ZOSYN) 2.25 g vial to attach to  ml bag  2.25 g Intravenous Q8H Lizett Davis  mL/hr at 01/26/25 1755 2.25 g at 01/27/25 0848    sevelamer carbonate (RENVELA) tablet 800 mg  800 mg Oral TID w/meals Lizett Davis MD   800 mg at 01/26/25 1756    sodium chloride (PF) 0.9% PF flush 3 mL  3 mL Intracatheter Q8H Lizett Davis MD   3 mL at 01/26/25 2122       Noted PRN meds are:  Current Facility-Administered Medications   Medication Dose Route Frequency Provider Last Rate Last Admin    acetaminophen (TYLENOL) tablet 650 mg  650 mg Oral Q4H PRN Lizett Davis MD   650 mg at 01/22/25 2118    Or    acetaminophen (TYLENOL) Suppository 650 mg  650 mg Rectal Q4H PRN Lizett Davis MD        calcium carbonate (TUMS) chewable tablet 1,000 mg  1,000 mg Oral 4x Daily PRN Lizett Davis MD        Continuing statin from home medication list OR statin order already placed during this visit   Does not apply DOES NOT GO TO Lizett Sykes MD        glucose gel 15-30 g  15-30 g Oral Q15 Min PRLizett Paredes MD        Or    dextrose 50 % injection 25-50 mL  25-50 mL Intravenous Q15 Min PRLizett Paredes MD        Or    glucagon injection 1 mg  1 mg Subcutaneous Q15 Min PRLizett Paredes MD        dianeal PD LOW calcium-2.5% dex (calcium 2.5 mEq/L) 6,000 mL PERITONEAL DIALYSATE   Dialysis [facility] Supplied PD Lizett Davis MD        gentamicin (GARAMYCIN) 0.1 % cream   Topical Daily PRN Lizett Davis MD        gentamicin (GARAMYCIN) 0.1 % cream   Topical Daily PRN Lizett Davis MD        HYDROmorphone (DILAUDID) injection 0.2 mg  0.2 mg Intravenous Q2H PRLizett Paredes MD        HYDROmorphone (DILAUDID) injection 0.4 mg  0.4 mg Intravenous Q2H PRN Lizett Davis MD        lidocaine (LMX4) cream   Topical Q1H PRN Lizett Davis MD        lidocaine 1 % 0.1-1 mL  0.1-1 mL Other Q1H PRN Lizett Davis MD        metoprolol (LOPRESSOR) injection 2.5 mg  2.5 mg Intravenous Q4H PRN  Lizett Davis MD        midodrine (PROAMATINE) tablet 2.5 mg  2.5 mg Oral Once PRN Lizett Davis MD        naloxone (NARCAN) injection 0.2 mg  0.2 mg Intravenous Q2 Min PRN Lizett Davis MD        Or    naloxone (NARCAN) injection 0.4 mg  0.4 mg Intravenous Q2 Min PRN Lizett Davis MD        Or    naloxone (NARCAN) injection 0.2 mg  0.2 mg Intramuscular Q2 Min PRN Lizett Davis MD        Or    naloxone (NARCAN) injection 0.4 mg  0.4 mg Intramuscular Q2 Min PRN Lizett Davis MD        ondansetron (ZOFRAN ODT) ODT tab 4 mg  4 mg Oral Q6H PRN Lizett Davis MD   4 mg at 01/24/25 0903    Or    ondansetron (ZOFRAN) injection 4 mg  4 mg Intravenous Q6H PRN Lizett Davis MD        oxyCODONE (ROXICODONE) tablet 5 mg  5 mg Oral Q4H PRN Lizett Davis MD   5 mg at 01/26/25 1229    oxyCODONE IR (ROXICODONE) half-tab 2.5 mg  2.5 mg Oral Q4H PRN Lizett Davis MD        Patient is already receiving anticoagulation with heparin, enoxaparin (LOVENOX), warfarin (COUMADIN)  or other anticoagulant medication   Does not apply Continuous PRN Lizett Davis MD        senna-docusate (SENOKOT-S/PERICOLACE) 8.6-50 MG per tablet 1 tablet  1 tablet Oral BID PRN Lizett Davis MD        Or    senna-docusate (SENOKOT-S/PERICOLACE) 8.6-50 MG per tablet 2 tablet  2 tablet Oral BID PRN Lizett Davis MD        sodium chloride (PF) 0.9% PF flush 3 mL  3 mL Intracatheter q1 min prn Lizett Davis MD           ROS:  Comprehensive ROS is  reviewed and is negative except as here & per HPI:     PHYSICAL EXAM:  Vital Signs: Temp: 98.4  F (36.9  C) Temp src: Oral BP: 116/83 Pulse: (!) 109   Resp: 18 SpO2: 97 % O2 Device: Nasal cannula Oxygen Delivery: 1 LPM  Weight: 167 lbs 15.85 oz  Wt Readings from Last 4 Encounters:   01/27/25 76.2 kg (167 lb 15.9 oz)   01/21/25 75.8 kg (167 lb)   01/03/25 78.9 kg (174 lb)   01/02/25 78.9 kg (173 lb 14.4 oz)     Lab Results   Component Value Date    ALBUMIN 2.7 01/21/2025     GENERAL:  Alert, fatigued, no   distress, frail, cachectic appearing elderly male lying in bed, appears comfortable.   HEAD: Normocephalic atraumatic  SCLERA: Anicteric  ABDOMEN:  Non distended  RESPIRATORY: Breathing non labored.  NEUROLOGIC: Alert.  PSYCH: Calm, cooperative.    Data reviewed:  Lab Results   Component Value Date    WBC 15.2 (H) 01/25/2025    WBC 12.6 (H) 01/24/2025    WBC 13.6 (H) 01/22/2025    HGB 9.8 (L) 01/25/2025    HGB 10.4 (L) 01/24/2025    HGB 12.2 (L) 01/22/2025    HCT 29.3 (L) 01/25/2025    HCT 32.0 (L) 01/24/2025    HCT 38.0 (L) 01/22/2025     01/25/2025     01/24/2025     (H) 01/22/2025     (L) 01/24/2025     (L) 01/22/2025     (L) 01/21/2025    POTASSIUM 4.5 01/24/2025    POTASSIUM 4.2 01/22/2025    POTASSIUM 4.3 01/21/2025    CHLORIDE 93 (L) 01/24/2025    CHLORIDE 93 (L) 01/22/2025    CHLORIDE 91 (L) 01/21/2025    CO2 26 01/24/2025    CO2 25 01/22/2025    CO2 25 01/21/2025    BUN 52.6 (H) 01/24/2025    BUN 54.0 (H) 01/22/2025    BUN 52.4 (H) 01/21/2025    CR 7.52 (H) 01/24/2025    CR 7.65 (H) 01/22/2025    CR 7.03 (H) 01/21/2025    GLC 87 01/27/2025     (H) 01/26/2025     (H) 01/26/2025    SED 86 (H) 01/21/2025    SED 86 (H) 10/15/2024    SED 58 (H) 07/09/2024    NTBNPI >70,000 (H) 01/03/2025    NTBNPI 18,739 (H) 11/27/2024    AST 17 01/21/2025    AST 17 01/03/2025    AST 18 12/27/2024    ALT 14 01/21/2025    ALT 14 01/03/2025    ALT 22 12/27/2024    ALKPHOS 246 (H) 01/21/2025    ALKPHOS 246 (H) 01/03/2025    ALKPHOS 292 (H) 12/27/2024    BILITOTAL 0.2 01/21/2025    BILITOTAL 0.3 01/03/2025    BILITOTAL 0.3 12/27/2024    INR 1.46 (H) 01/23/2025    INR 1.65 (H) 01/23/2025    INR 1.78 (H) 01/22/2025        Results for orders placed or performed during the hospital encounter of 01/21/25   US Lower Extremity Arterial Duplex Right    Impression    IMPRESSION:  1.  No blood flow demonstrated in the distal superficial femoral, popliteal, and runoff arteries. There may be  trickle blood flow in the distal runoff arteries, though difficult to distinguish from artifact.    2.  Uncertain if stent is in the right lower extremity. Previous angiogram images are not available for review.     US Upper Extremity Venous Mapping Bilateral    Impression    IMPRESSION: Bilateral upper extremity vein measurements as described. Both cephalic veins are relatively small in caliber.     US Lower Extremity Venous Mapping Right    Impression    IMPRESSION:  1.  Right great saphenous vein measurements as described, relatively small diameters. Areas of echogenic walls, may relate to chronic superficial thrombophlebitis.   XR Calcaneus Right G/E 2 Views    Impression    IMPRESSION: Soft tissue ulcer plantar to the calcaneus. There is diffuse demineralization without discrete erosion. MRI would be more sensitive for early findings of osteomyelitis. Plantar calcaneal spur. Achilles enthesophyte. Vascular calcifications.   CTA Abdomen Pelvis Runoff w Contrast    Impression    IMPRESSION:  1.  Occluded distal right superficial femoral artery and an popliteal artery as above including stents in the SFA and popliteal arteries. The tibial arteries are difficult to evaluate due to poor flow and calcified plaque. The distal anterior tibial and   peroneal arteries are patent.  2.  Patent left common femoral artery and common femoral artery to tibioperoneal trunk bypass graft without evidence for significant stenosis.  3.  Large right pleural effusion and small left pleural effusion.  4.  Ascites most likely relates to a peritoneal dialysis catheter.  5.  Colonic diverticulosis.   MR Ankle Right w/o Contrast    Impression    IMPRESSION:  1.  There is a soft tissue ulceration over the posterior aspect of the hindfoot with some surrounding edema or cellulitis but no evidence for abscess.  2.  No evidence for osteomyelitis.  3.  Small tibiotalar joint effusion.  4.  No evidence for fracture.  5.  Acute on chronic plantar  fasciitis. Plantar calcaneal spurring.  6.  No additional tendinous or ligamentous pathology.       US Thoracentesis    Impression    IMPRESSION:  Status post right ultrasound-guided thoracentesis.         Medical Decision Making       Please see A&P for additional details of medical decision making.  MANAGEMENT DISCUSSED with the following over the past 24 hours: Dr Schwarz   NOTE(S)/MEDICAL RECORDS REVIEWED over the past 24 hours: ED, vascular surgery, medicine, nephrology   Tests REVIEWED in the past 24 hours:  - See lab/imaging results included in the data section of the note  SUPPLEMENTAL HISTORY, in addition to the patient's history, over the past 24 hours obtained from:   - Keagan Mueller         Much or all of the text in this note was generated through the use of Dragon dictation, voice to text software. Errors in spelling or words which appear to be out of context are unintentional and may be present due to having escaped editing.

## 2025-01-27 NOTE — PROGRESS NOTES
VASCULAR SURGERY    Patient has increasing ischemic rest pain due to his right leg PAD and worsening right lateral heel necrotic ulcer.    We plan to perform bypass today for limb salvage along with debridement of the ulcer.    Noted to have increased infiltrate in the right lung which may represent pneumonia along with atelectasis.    We discussed the option of delaying surgery but that would put his leg and worsening infection at risk if we continue to delay this which would be for at least multiple days.  The other option would be to plan to go ahead with the surgery this afternoon but very likely patient would we remain intubated and continue on antibiotics postprocedure.    Discussed with patient would like to proceed.  Aware of the difficult decision.       Patrick Hein MD

## 2025-01-27 NOTE — PLAN OF CARE
Date/Time: 01/26/25 5418-5292  Summary: Transfer from [place] with R lower extremity leg pain w/ R leg and heel ulcer. Diagnostic angiogram done 1/23  Behavior & Aggression: Green   Fall Risk: Yes   Orientation: A&Ox4  ABNL VS/O2: VSS on 1-2L NC  Labs: See results  Pain Management: PRN oxy x1 for pain  Bowel/Bladder: On peritoneal dialysis overnight. Little UOP,  BM this shift  IV/Drains: PIV infusing heparin @ 16mL/hr  Skin: R heel/leg ulcer. Scattered scabs and bruising. Blanchable redness to coccyx. Old incisions from procedure in October. Doppler pulses, RLE (foot/heel) cool to touch and change in color/more cyanotic and unable to doppler pulses change from this am to afternoon. Vascular paged and aware, no change in plan since surgery for bypass scheduled tomorrow.   Diet: Renal dialysis diet, tolerating.   Activity Level: A+1 gb/w, pivot to cart  Tests/Procedures: Peritoneal dialysis at night - can do this by himself with help from staff   Anticipated DC Date: Thoracentesis completed 1/24 with 1.5L taken off. MRI of R foot completed 1/25, see results. Plan for surgery on Monday by Vascular. Vascular/Podiatry/Hospitalist/Nephrology/SW following. Patient does Peritoneal dialysis overnight, supplies in room and set up.

## 2025-01-27 NOTE — PLAN OF CARE
Goal Outcome Evaluation:      Plan of Care Reviewed With: patient    Overall Patient Progress: no changeOverall Patient Progress: no change         Date/Time: 01/26/25 9278-7952  Summary: Transfer from [place] with R lower extremity leg pain w/ R leg and heel ulcer. Diagnostic angiogram done 1/23  Precautions: Contact precautions for MRSA.  Behavior & Aggression: Green   Fall Risk: Yes   Orientation: A&Ox4  ABNL VS/O2: VSS on 1L NC  Labs: See chart  Pain Management: Repositioned, avoid touching R extremity  Bowel/Bladder: Peritoneal dialysis. Cont. B/B  IV/Drains: PIV infusing heparin at 1600 units/hr, intermittent antibiotics  Skin: R heel/leg ulcer. Scattered scabs and bruising. Blanchable redness to coccyx. Old incisions from procedure in October. Doppler pulses, RLE (foot/heel) cool to touch and cyanotic and unable to [place] (or extremely faint, cannot tell with static). Vascular aware, planning surgery for 1/27  Diet: Renal Diet  Activity Level: A1 GB/W  Tests/Procedures: Peritoneal dialysis at night - can do this by himself with help from staff   Anticipated DC Date: Thoracentesis completed 1/24 with 1.5L taken off. MRI of R foot completed 1/25, see results. Plan for surgery on Monday by Vascular. Vascular/Podiatry/Hospitalist/Nephrology/SW following.

## 2025-01-27 NOTE — PROGRESS NOTES
Nephrology Progress Note  01/27/2025         Assessment & Recommendations:   Arnulfo Pritchett is a 64 year old male CAD, HTN, HLD, pafib, HFrEF (EF 20-25%), ESRD on PD, DM2, TALI, RCC s/p R nephrectomy (2022), PAD with multiple LE procedures     1 End stage renal disease on PD  Chronic PD for last 3.5 years.  Home unit [facility], nephrologist Dr. May  Rx: 5 cycles, 2 L fill volumes, 9 hours, last fill 1.2 L with external.    2 PD, right heel gangrene, occluded right SFA  -Plan for bypass surgery today    Other issues-  Diabetes mellitus  Chronic paroxysmal atrial fibrillation  Secondary hyperparathyroidism    Plan-  Patient is n.p.o. for surgery.  Switch to mix of 2.5% and 1.5% tonight.  Otherwise in prescription.  2 L time 5 over 9 hours.        Jennifer Jameson MD  [facility] - Nephrology   [phone]      Interval History :   Seen / examined.   No new complaints.  N.p.o. for surgery later today      Physical Exam:   I/O last 3 completed shifts:  In: 240 [P.O.:240]  Out: 100 [Urine:100]  /83 (BP Location: Right arm)   Pulse (!) 109   Temp 98.4  F (36.9  C) (Oral)   Resp 18   Wt 76.2 kg (167 lb 15.9 oz)   SpO2 97%   BMI 22.16 kg/m      Comfortable  Chest clear  S1 plus S2  No edema  PD catheter in situ    Labs:   All labs reviewed by me  Electrolytes/Renal -   Recent Labs   Lab Test 01/27/25  1244 01/27/25  1217 01/27/25  1154 01/24/25  0838 01/24/25  0425 01/22/25  0952 01/22/25  0733 01/21/25  2209 01/21/25  1606 01/03/25  2046 11/27/24  1949 11/27/24  1035 11/09/24  0815 11/09/24  0718 11/08/24  1211 11/08/24  0652 11/07/24  0744 11/07/24  0537   NA  --   --   --   --  133*  --  131*  --  130* 132*   < > 129*  --  133*  --  133*  --  132*   POTASSIUM  --   --   --   --  4.5  --  4.2  --  4.3 5.2   < > 4.9  --  4.2  --  4.1  --  4.6   CHLORIDE  --   --   --   --  93*  --  93*  --  91* 93*   < > 89*  --  95*  --  95*  --  93*   CO2  --   --   --   --  26  --  25  --  25 21*   < > 26   --  24  --  25  --  21*   BUN  --   --   --   --  52.6*  --  54.0*  --  52.4* 54.3*   < > 41.0*  --  43.8*  --  48.1*  --  49.6*   CR  --   --   --   --  7.52*  --  7.65*  --  7.03* 9.06*   < > 7.58*  --  7.51*  --  7.26*  --  7.44*   GLC 83 54* 59*   < > 257*   < > 129*   < > 170* 139*   < > 150*   < > 144*   < > 192*   < > 204*   TERENCE  --   --   --   --  9.3  --  9.8  --  10.4 9.9   < > 9.2  --  8.9  --  9.1  --  9.0   MAG  --   --   --   --   --   --   --   --   --  1.5*  --  1.8  --   --   --   --   --   --    PHOS  --   --   --   --   --   --   --   --   --   --   --   --   --  5.3*  --  5.2*  --  5.2*    < > = values in this interval not displayed.       CBC -   Recent Labs   Lab Test 01/25/25  0735 01/24/25  0425 01/22/25  1451   WBC 15.2* 12.6* 13.6*   HGB 9.8* 10.4* 12.2*    409 457*       LFTs -   Recent Labs   Lab Test 01/24/25  1612 01/21/25  1606 01/03/25  2046 12/27/24  1346   ALKPHOS  --  246* 246* 292*   BILITOTAL  --  0.2 0.3 0.3   ALT  --  14 14 22   AST  --  17 17 18   PROTTOTAL 5.7* 6.9 6.2* 6.1*   ALBUMIN  --  2.7* 3.0* 3.1*         Current Medications:  Current Facility-Administered Medications   Medication Dose Route Frequency Provider Last Rate Last Admin    allopurinol (ZYLOPRIM) tablet 200 mg  200 mg Oral QPM Lizett Davis MD   200 mg at 01/26/25 1957    atorvastatin (LIPITOR) tablet 40 mg  40 mg Oral At Bedtime Lizett Davis MD   40 mg at 01/26/25 2207    calcitRIOL (ROCALTROL) capsule 0.25 mcg  0.25 mcg Oral At Bedtime Lizett Davis MD   0.25 mcg at 01/26/25 2207    cinacalcet (SENSIPAR) tablet 60 mg  60 mg Oral Q Mon Wed Fri AM Lizett Davis MD   60 mg at 01/24/25 2151    clopidogrel (PLAVIX) tablet 75 mg  75 mg Oral QPM Lizett Davis MD   75 mg at 01/26/25 1957    insulin aspart (NovoLOG) injection (RAPID ACTING)  1-7 Units Subcutaneous TID AC Lizett Davis MD   1 Units at 01/26/25 1256    insulin aspart (NovoLOG) injection (RAPID ACTING)  1-5 Units Subcutaneous At Bedtime  Lizett Davis MD   2 Units at 01/25/25 2221    insulin glargine (LANTUS PEN) injection 15 Units  15 Units Subcutaneous At Bedtime Lizett Davis MD   15 Units at 01/26/25 2208    metoprolol succinate ER (TOPROL-XL) 24 hr half-tab 25 mg  25 mg Oral Daily Lizett Davis MD   25 mg at 01/27/25 0848    multivitamin RENAL (TRIPHROCAPS) capsule 1 capsule  1 capsule Oral Daily Lizett Davis MD   1 capsule at 01/27/25 0848    pantoprazole (PROTONIX) EC tablet 40 mg  40 mg Oral QAM AC Lizett Davis MD   40 mg at 01/27/25 0848    piperacillin-tazobactam (ZOSYN) 2.25 g vial to attach to  ml bag  2.25 g Intravenous Q8H Lizett Davis  mL/hr at 01/26/25 1755 2.25 g at 01/27/25 0848    sevelamer carbonate (RENVELA) tablet 800 mg  800 mg Oral TID w/meals Lizett Davis MD   800 mg at 01/26/25 1756    sodium chloride (PF) 0.9% PF flush 3 mL  3 mL Intracatheter Q8H Lizett Davis MD   3 mL at 01/26/25 2122     Current Facility-Administered Medications   Medication Dose Route Frequency Provider Last Rate Last Admin    Continuing statin from home medication list OR statin order already placed during this visit   Does not apply DOES NOT GO TO Lizett Sykes MD        dextrose 5% and 0.9% NaCl infusion   Intravenous Continuous Alejandro Schwarz MD        dianeal PD LOW calcium-2.5% dex (calcium 2.5 mEq/L) 6,000 mL PERITONEAL DIALYSATE   Dialysis [facility] Supplied PD Lizett Davis MD        heparin 25,000 units in 0.45% NaCl 250 mL ANTICOAGULANT infusion  0-5,000 Units/hr Intravenous Continuous Lizett Davis MD 17.5 mL/hr at 01/27/25 0858 1,750 Units/hr at 01/27/25 0858    Patient is already receiving anticoagulation with heparin, enoxaparin (LOVENOX), warfarin (COUMADIN)  or other anticoagulant medication   Does not apply Continuous PRN Lizett Davis MD Jiwan Thapa, MD

## 2025-01-27 NOTE — PROGRESS NOTES
Cycler set up per protocol and per treatment orders     Results from previous treatment:  Effluent color and clarity: Yellow and clear, no fibrin noted  Total UF: 956 ml   Initial Drain: 84 ml  Avg Dwell: 1:16  Lost Dwell: 0:29    Cycler Serial Number: [id number]  Total Treatment Volume: 52330 mL  Total treatment time: 9 hrs  Fill Volume: 2000 ml  Last Fill Volume:0 ml  Heater ba.5% 6000mL;  Lot #: [id number];  Expiration Date: 2026  Side Bag #1: 1.5% 6000mL;  Lot #: [id number];  Expiration Date: 2026    Number of cycles including final fill: 5    Last Fill Volume: 0ml  Initial Drain Alarm: 0ml  PD orders reviewed with Patient procedure and ESRD teaching done and questions answered.  Cycler ready for hook-up.    Report given to: MARYSOL Son consent form signed yes

## 2025-01-27 NOTE — PROGRESS NOTES
[facility]  Hospitalist Progress Note        Alejandro Schwarz MD   01/27/2025        Interval History:        - no acute issues overnight ; planned for surgery later this afternoon; however, vascular surgery discussed with family who requested for a palliative care consult prior to surgery          Assessment and Plan:        Arnulfo Pritchett is a 64 year old male with PMH of ESRD (on peritoneal dialysis), diabetes mellitus, GERD, TALI, gout, CAD (h/o multiple stents), chronic paroxysmal A-fib (s/p ablation, on warfarin) , Chronic severe HFrEF, ischemic cardiomyopathy (EF 20-25%),  hyperlipidemia, PAD (s/p left common femoral endarterectomy and a left common femoral artery to tibioperoneal trunk bypass on 25 October 2024 and Follow-up VLADIMIR Doppler on 1/6/2025 noted critical resting arterial insufficiency of the right lower extremity), renal cell carcinoma (s/p nephrectomy) who presented to outside hospital with right leg pain along with shortness of breath or cough and was also found to have right leg ulcer along with right heel ulcer and transferred to [facility] for vascular surgery evaluation on 1/21/2025.     Right heel gangrene with heel ulcer and ischemic pain  Occluded right SFA around the stent (IR angiogram 1/23/25)    H/o Left lower extremity critical limb ischemia (s/p left common femoral endarterectomy and a left common femoral artery to tibioperoneal trunk bypass on 25 October 2024)  Status ligation of the great saphenous vein on 10/27/2024  Hx of acute RLE arterial occlusion s/p SFA popliteal balloon angioplasty and stenting (5/2024)   Dyslipidemia    - Follows with Dr. Gray and underwent ultrasound VLADIMIR Doppler on 1/6/2025 which was concerning for critical resting arterial insufficiency in the right lower extremity  - Arterial Doppler left 1/6/2025-patent left common femoral artery to tibioperoneal trunk bypass  - PTA regimen includes Coumadin and Plavix  - INR therapeutic on  "admission     - Remains afebrile, mild leukocytosis 13, , ESR 86, normal lactic acid  - evaluated by vascular surgery, RLE duplex (1/22) note SFA stent occluded,   - Vascular reversed warfarin and initiated Heparin drip when INR <2 (1/22)  - s/p IR angiogram 1/23/25; per vascular: note of \"CFA disease, SFA occlusion around the stent,  BK pop-TPT open, but diseased. AT/peroneal/PT runoff, best with AT to DP\"  - vascular surgery plans for bypass on 1/27/25  - MRI right ankle (1/25) noted no evidence for osteomyelitis; small tibiotalar joint effusion  - podiatry following and considering debridement, likely after bypass surgery  - will continue with empiric IV Zosyn  - continue PTA Plavix, Lipitor     Cough and shortness of breath with recent pneumonia  Moderate to large right pleural effusion, s/p thoracentesis (1/24)  Acute hypoxic respiratory failure  - On admission presented with shortness of breath and nonproductive cough; was recently diagnosed with pneumonia on 1/3 and completed course of cefdinir and is taking twice daily nebulization treatments  - COVID-19, influenza and RSV is negative  - Chest x-ray was noted slight increase in volume of the now moderate right pleural effusion with associated compressive atelectasis without any pneumothorax  - Encourage incentive spirometer  - empiric Zosyn as above for foot wound  - PRN cough meds  - was requiring on 1-2 L nasal cannula oxygen ; CT chest 1/23 noted large right pleural effusion>left  - s/p right thoracentesis with removal of 1.5 L (1/24); respiratory status much improved , oxygen weaned down to room air but occasionally needing for comfort  - As needed cough medicine      H/ of chronic paroxysmal A-fib (s/p ablation )  Coronary artery disease status multiple stents  Hyperlipidemia  Chronic severe HFrEF, ischemic cardiomyopathy, not in acute exacerbation (20-25%):   - Continue with PTA metoprolol succinate 25 mg daily   - metoprolol PRN IV for " sustained heart rate> 120  - anticoagulation discussion as above     Diabetes mellitus with hypoglycemia  - PTA on Lantus 35 units daily   - started on lower dose at 15 units daily   - BG mostly in 180s--200s; on 1/27 noted BG in 50s while NPO for surgery; will start D5 NS at 75 ml/hr while NPO and will hold bedtime Lantus  - will up titrate as needed pending plans for procedures  - moderate resistance sliding scale insulin with hypoglycemia protocol      ESRD on peritoneal dialysis  Secondary hyperparathyroidism  Chronic hyponatremia  - his sodium during the course of recent few months have been fluctuating between around 130s  - nephrology following and initiated on peritoneal dialysis  - Continue with PTA sevelamer and Sensipar       Anemia of chronic disease  - His baseline hemoglobin fluctuates between 10.2-11.4  - Hb stable around baseline     GERD  - continue with PTA PPI     Gout  -continue PTA allopurinol      Renal cell carcinoma status post right nephrectomy  - Noted     Obstructive sleep apnea  -Noted and seems intolerant to CPAP    Diet: NPO for Medical/Clinical Reasons Except for: Meds      DVT Prophylaxis: warfarin/Heparin    Code status: full code    Disposition:   Medically Ready for Discharge: Anticipated in 2-4 Days pending vascular surgery and podiatry intervention    - planned for surgery later this afternoon (1/27); however, vascular surgery discussed with family who requested for a palliative care consult prior to surgery     Clinically Significant Risk Factors Present on Admission               # Hypoalbuminemia: Lowest albumin = 2.7 g/dL at 1/21/2025  4:06 PM, will monitor as appropriate             # Hypertension: Noted on problem list                        # Financial/Environmental Concerns:                              Physical Exam:      Blood pressure (!) 131/97, pulse (!) 110, temperature 97.8  F (36.6  C), temperature source Oral, resp. rate 16, weight 76.2 kg (167 lb 15.9 oz), SpO2  98%.  Vitals:    01/23/25 0626 01/26/25 0724 01/27/25 0658   Weight: 81.3 kg (179 lb 3.7 oz) 81.5 kg (179 lb 9.6 oz) 76.2 kg (167 lb 15.9 oz)     Vital Signs with Ranges  Temp:  [97.3  F (36.3  C)-97.8  F (36.6  C)] 97.8  F (36.6  C)  Pulse:  [104-110] 110  Resp:  [16-18] 16  BP: (128-131)/(89-97) 131/97  SpO2:  [97 %-98 %] 98 %  I/O's Last 24 hours  I/O last 3 completed shifts:  In: 240 [P.O.:240]  Out: 100 [Urine:100]    Constitutional: Alert, awake and oriented; resting comfortably in no apparent distress           Cardiovascular: Normal s1 s2, regular rate and rhythm, no murmur   Lungs: B/l clear, no wheezes or crepitations   Abdomen: Soft, nt, nd, no guarding, rigidity or rebound; BS +   LE : Right foot with gangrenous heel ulcer     Musculoskeletal/Neuro Power 5/5 in all extremities; No focal neurological deficits noted   Psychiatry: normal mood and affect                Medications:        Current Facility-Administered Medications   Medication Dose Route Frequency Provider Last Rate Last Admin    allopurinol (ZYLOPRIM) tablet 200 mg  200 mg Oral QPM Lizett Davis MD   200 mg at 01/26/25 1957    atorvastatin (LIPITOR) tablet 40 mg  40 mg Oral At Bedtime Lizett Davis MD   40 mg at 01/26/25 2207    calcitRIOL (ROCALTROL) capsule 0.25 mcg  0.25 mcg Oral At Bedtime Lizett Davis MD   0.25 mcg at 01/26/25 2207    cinacalcet (SENSIPAR) tablet 60 mg  60 mg Oral Q Mon Wed Fri AM Lizett Davis MD   60 mg at 01/24/25 2151    clopidogrel (PLAVIX) tablet 75 mg  75 mg Oral QPM Lizett Davis MD   75 mg at 01/26/25 1957    insulin aspart (NovoLOG) injection (RAPID ACTING)  1-7 Units Subcutaneous TID AC Lizett Davis MD   1 Units at 01/26/25 1256    insulin aspart (NovoLOG) injection (RAPID ACTING)  1-5 Units Subcutaneous At Bedtime Lizett Davis MD   2 Units at 01/25/25 2221    insulin glargine (LANTUS PEN) injection 15 Units  15 Units Subcutaneous At Bedtime Lizett Davis MD   15 Units at 01/26/25 2208     metoprolol succinate ER (TOPROL-XL) 24 hr half-tab 25 mg  25 mg Oral Daily Lizett Davis MD   25 mg at 01/26/25 0920    multivitamin RENAL (TRIPHROCAPS) capsule 1 capsule  1 capsule Oral Daily Lizett Davis MD   1 capsule at 01/26/25 0920    pantoprazole (PROTONIX) EC tablet 40 mg  40 mg Oral QAM AC Lizett Davis MD   40 mg at 01/26/25 0920    piperacillin-tazobactam (ZOSYN) 2.25 g vial to attach to  ml bag  2.25 g Intravenous Q8H Lizett Davis  mL/hr at 01/26/25 1755 2.25 g at 01/27/25 0030    sevelamer carbonate (RENVELA) tablet 800 mg  800 mg Oral TID w/meals Lizett Davis MD   800 mg at 01/26/25 1756    sodium chloride (PF) 0.9% PF flush 3 mL  3 mL Intracatheter Q8H Lizett Davis MD   3 mL at 01/26/25 2122     PRN Meds:   Current Facility-Administered Medications   Medication Dose Route Frequency Provider Last Rate Last Admin    acetaminophen (TYLENOL) tablet 650 mg  650 mg Oral Q4H PRN Lizett Davis MD   650 mg at 01/22/25 2118    Or    acetaminophen (TYLENOL) Suppository 650 mg  650 mg Rectal Q4H PRN Lizett Davis MD        calcium carbonate (TUMS) chewable tablet 1,000 mg  1,000 mg Oral 4x Daily PRN Lizett Davis MD        Continuing statin from home medication list OR statin order already placed during this visit   Does not apply DOES NOT GO TO Lizett Sykes MD        glucose gel 15-30 g  15-30 g Oral Q15 Min PRN Lizett Davis MD        Or    dextrose 50 % injection 25-50 mL  25-50 mL Intravenous Q15 Min PRLizett Paredes MD        Or    glucagon injection 1 mg  1 mg Subcutaneous Q15 Min PRLizett Paredes MD        dianeal PD LOW calcium-2.5% dex (calcium 2.5 mEq/L) 6,000 mL PERITONEAL DIALYSATE   Dialysis [facility] Supplied PD Lizett Davis MD        gentamicin (GARAMYCIN) 0.1 % cream   Topical Daily PRN Lizett Davis MD        gentamicin (GARAMYCIN) 0.1 % cream   Topical Daily PRN Lizett Davis MD        HYDROmorphone (DILAUDID) injection 0.2 mg  0.2 mg Intravenous Q2H PRN  Lizett Davis MD        HYDROmorphone (DILAUDID) injection 0.4 mg  0.4 mg Intravenous Q2H PRN Lizett Davis MD        lidocaine (LMX4) cream   Topical Q1H PRN Lizett Davis MD        lidocaine 1 % 0.1-1 mL  0.1-1 mL Other Q1H PRN Lizett Davis MD        metoprolol (LOPRESSOR) injection 2.5 mg  2.5 mg Intravenous Q4H PRN Lizett Davis MD        midodrine (PROAMATINE) tablet 2.5 mg  2.5 mg Oral Once PRN Lizett Davis MD        naloxone (NARCAN) injection 0.2 mg  0.2 mg Intravenous Q2 Min PRN Lizett Davis MD        Or    naloxone (NARCAN) injection 0.4 mg  0.4 mg Intravenous Q2 Min PRN Lizett Davis MD        Or    naloxone (NARCAN) injection 0.2 mg  0.2 mg Intramuscular Q2 Min PRN Lizett Davis MD        Or    naloxone (NARCAN) injection 0.4 mg  0.4 mg Intramuscular Q2 Min PRN Lizett Davis MD        ondansetron (ZOFRAN ODT) ODT tab 4 mg  4 mg Oral Q6H PRN Lizett Davis MD   4 mg at 01/24/25 0903    Or    ondansetron (ZOFRAN) injection 4 mg  4 mg Intravenous Q6H PRN Lizett Davis MD        oxyCODONE (ROXICODONE) tablet 5 mg  5 mg Oral Q4H PRN Lizett Davis MD   5 mg at 01/26/25 1229    oxyCODONE IR (ROXICODONE) half-tab 2.5 mg  2.5 mg Oral Q4H PRN Lizett Davis MD        Patient is already receiving anticoagulation with heparin, enoxaparin (LOVENOX), warfarin (COUMADIN)  or other anticoagulant medication   Does not apply Continuous PRN Lizett Davis MD        senna-docusate (SENOKOT-S/PERICOLACE) 8.6-50 MG per tablet 1 tablet  1 tablet Oral BID PRN Lizett Davis MD        Or    senna-docusate (SENOKOT-S/PERICOLACE) 8.6-50 MG per tablet 2 tablet  2 tablet Oral BID Lizett Vasquez MD        sodium chloride (PF) 0.9% PF flush 3 mL  3 mL Intracatheter q1 min Lizett Vasquez MD                Data:      All new lab and imaging data was reviewed.   Recent Labs   Lab Test 01/25/25  0735 01/24/25  0425 01/23/25  1517 01/23/25  0449 01/22/25  2157 01/22/25  1451   WBC 15.2* 12.6*  --   --   --  13.6*  "  HGB 9.8* 10.4*  --   --   --  12.2*   MCV 96 95  --   --   --  96    409  --   --   --  457*   INR  --   --  1.46* 1.65* 1.78*  --       Recent Labs   Lab Test 01/26/25  2102 01/26/25  1755 01/26/25  1613 01/24/25  0838 01/24/25  0425 01/22/25  0952 01/22/25  0733 01/22/25  0228 01/21/25  1606   NA  --   --   --   --  133*  --  131*  --  130*   POTASSIUM  --   --   --   --  4.5  --  4.2  --  4.3   CHLORIDE  --   --   --   --  93*  --  93*  --  91*   CO2  --   --   --   --  26  --  25  --  25   BUN  --   --   --   --  52.6*  --  54.0*  --  52.4*   CR  --   --   --   --  7.52*  --  7.65*  --  7.03*   ANIONGAP  --   --   --   --  14  --  13  --  14   TERENCE  --   --   --   --  9.3  --  9.8  --  10.4   * 122* 146*   < > 257*   < > 129*   < > 170*    < > = values in this interval not displayed.     No lab results found.    Invalid input(s): \"TROP\", \"TROPONINIES\"      "

## 2025-01-28 ENCOUNTER — APPOINTMENT (OUTPATIENT)
Dept: GENERAL RADIOLOGY | Facility: CLINIC | Age: 65
DRG: 853 | End: 2025-01-28
Payer: MEDICARE

## 2025-01-28 LAB
ALLEN'S TEST: ABNORMAL
ALLEN'S TEST: NO
ANION GAP SERPL CALCULATED.3IONS-SCNC: 15 MMOL/L (ref 7–15)
BASE EXCESS BLDA CALC-SCNC: -4.8 MMOL/L (ref -3–3)
BASE EXCESS BLDA CALC-SCNC: -5 MMOL/L (ref -3–3)
BASE EXCESS BLDA CALC-SCNC: -5.8 MMOL/L (ref -3–3)
BASE EXCESS BLDA CALC-SCNC: -6.4 MMOL/L (ref -3–3)
BASE EXCESS BLDA CALC-SCNC: -6.6 MMOL/L (ref -3–3)
BASE EXCESS BLDA CALC-SCNC: -7.4 MMOL/L (ref -3–3)
BUN SERPL-MCNC: 51.3 MG/DL (ref 8–23)
CALCIUM SERPL-MCNC: 9.6 MG/DL (ref 8.8–10.4)
CHLORIDE SERPL-SCNC: 98 MMOL/L (ref 98–107)
CREAT SERPL-MCNC: 7.65 MG/DL (ref 0.67–1.17)
EGFRCR SERPLBLD CKD-EPI 2021: 7 ML/MIN/1.73M2
ERYTHROCYTE [DISTWIDTH] IN BLOOD BY AUTOMATED COUNT: 18.2 % (ref 10–15)
ERYTHROCYTE [DISTWIDTH] IN BLOOD BY AUTOMATED COUNT: 18.3 % (ref 10–15)
GLUCOSE BLDC GLUCOMTR-MCNC: 174 MG/DL (ref 70–99)
GLUCOSE BLDC GLUCOMTR-MCNC: 203 MG/DL (ref 70–99)
GLUCOSE BLDC GLUCOMTR-MCNC: 204 MG/DL (ref 70–99)
GLUCOSE BLDC GLUCOMTR-MCNC: 222 MG/DL (ref 70–99)
GLUCOSE BLDC GLUCOMTR-MCNC: 251 MG/DL (ref 70–99)
GLUCOSE BLDC GLUCOMTR-MCNC: 253 MG/DL (ref 70–99)
GLUCOSE SERPL-MCNC: 208 MG/DL (ref 70–99)
HCO3 BLD-SCNC: 19 MMOL/L (ref 21–28)
HCO3 BLD-SCNC: 20 MMOL/L (ref 21–28)
HCO3 BLD-SCNC: 21 MMOL/L (ref 21–28)
HCO3 BLD-SCNC: 22 MMOL/L (ref 21–28)
HCO3 SERPL-SCNC: 21 MMOL/L (ref 22–29)
HCT VFR BLD AUTO: 28.3 % (ref 40–53)
HCT VFR BLD AUTO: 29.3 % (ref 40–53)
HGB BLD-MCNC: 9.2 G/DL (ref 13.3–17.7)
HGB BLD-MCNC: 9.5 G/DL (ref 13.3–17.7)
LACTATE SERPL-SCNC: 1.7 MMOL/L (ref 0.7–2)
MAGNESIUM SERPL-MCNC: 1.7 MG/DL (ref 1.7–2.3)
MCH RBC QN AUTO: 30.8 PG (ref 26.5–33)
MCH RBC QN AUTO: 31 PG (ref 26.5–33)
MCHC RBC AUTO-ENTMCNC: 32.4 G/DL (ref 31.5–36.5)
MCHC RBC AUTO-ENTMCNC: 32.5 G/DL (ref 31.5–36.5)
MCV RBC AUTO: 95 FL (ref 78–100)
MCV RBC AUTO: 96 FL (ref 78–100)
O2/TOTAL GAS SETTING VFR VENT: 30 %
O2/TOTAL GAS SETTING VFR VENT: 40 %
OXYHGB MFR BLDA: 96 % (ref 92–100)
OXYHGB MFR BLDA: 96 % (ref 92–100)
OXYHGB MFR BLDA: 97 % (ref 92–100)
OXYHGB MFR BLDA: 98 % (ref 92–100)
PCO2 BLD: 42 MM HG (ref 35–45)
PCO2 BLD: 44 MM HG (ref 35–45)
PCO2 BLD: 45 MM HG (ref 35–45)
PCO2 BLD: 50 MM HG (ref 35–45)
PEEP: 5 CM H2O
PEEP: 5 CM H2O
PH BLD: 7.23 [PH] (ref 7.35–7.45)
PH BLD: 7.26 [PH] (ref 7.35–7.45)
PH BLD: 7.27 [PH] (ref 7.35–7.45)
PH BLD: 7.28 [PH] (ref 7.35–7.45)
PH BLD: 7.29 [PH] (ref 7.35–7.45)
PH BLD: 7.29 [PH] (ref 7.35–7.45)
PHOSPHATE SERPL-MCNC: 8 MG/DL (ref 2.5–4.5)
PLATELET # BLD AUTO: 308 10E3/UL (ref 150–450)
PLATELET # BLD AUTO: 331 10E3/UL (ref 150–450)
PO2 BLD: 109 MM HG (ref 80–105)
PO2 BLD: 114 MM HG (ref 80–105)
PO2 BLD: 117 MM HG (ref 80–105)
PO2 BLD: 118 MM HG (ref 80–105)
PO2 BLD: 121 MM HG (ref 80–105)
PO2 BLD: 150 MM HG (ref 80–105)
POTASSIUM SERPL-SCNC: 5.2 MMOL/L (ref 3.4–5.3)
RBC # BLD AUTO: 2.99 10E6/UL (ref 4.4–5.9)
RBC # BLD AUTO: 3.06 10E6/UL (ref 4.4–5.9)
SAO2 % BLDA: 98.1 % (ref 96–97)
SAO2 % BLDA: 98.2 % (ref 96–97)
SAO2 % BLDA: 98.7 % (ref 96–97)
SAO2 % BLDA: 98.9 % (ref 96–97)
SAO2 % BLDA: 99.2 % (ref 96–97)
SAO2 % BLDA: 99.2 % (ref 96–97)
SODIUM SERPL-SCNC: 134 MMOL/L (ref 135–145)
UFH PPP CHRO-ACNC: <0.1 IU/ML
UFH PPP CHRO-ACNC: <0.1 IU/ML
WBC # BLD AUTO: 16.7 10E3/UL (ref 4–11)
WBC # BLD AUTO: 16.9 10E3/UL (ref 4–11)

## 2025-01-28 PROCEDURE — 250N000013 HC RX MED GY IP 250 OP 250 PS 637

## 2025-01-28 PROCEDURE — 250N000011 HC RX IP 250 OP 636

## 2025-01-28 PROCEDURE — 84100 ASSAY OF PHOSPHORUS: CPT

## 2025-01-28 PROCEDURE — 83605 ASSAY OF LACTIC ACID: CPT | Performed by: INTERNAL MEDICINE

## 2025-01-28 PROCEDURE — 85027 COMPLETE CBC AUTOMATED: CPT

## 2025-01-28 PROCEDURE — 250N000013 HC RX MED GY IP 250 OP 250 PS 637: Performed by: INTERNAL MEDICINE

## 2025-01-28 PROCEDURE — 99232 SBSQ HOSP IP/OBS MODERATE 35: CPT | Performed by: PODIATRIST

## 2025-01-28 PROCEDURE — 85520 HEPARIN ASSAY: CPT

## 2025-01-28 PROCEDURE — 250N000013 HC RX MED GY IP 250 OP 250 PS 637: Performed by: STUDENT IN AN ORGANIZED HEALTH CARE EDUCATION/TRAINING PROGRAM

## 2025-01-28 PROCEDURE — 80048 BASIC METABOLIC PNL TOTAL CA: CPT

## 2025-01-28 PROCEDURE — 250N000011 HC RX IP 250 OP 636: Performed by: STUDENT IN AN ORGANIZED HEALTH CARE EDUCATION/TRAINING PROGRAM

## 2025-01-28 PROCEDURE — 83735 ASSAY OF MAGNESIUM: CPT

## 2025-01-28 PROCEDURE — 99233 SBSQ HOSP IP/OBS HIGH 50: CPT | Performed by: INTERNAL MEDICINE

## 2025-01-28 PROCEDURE — 99207 PR NO BILLABLE SERVICE THIS VISIT: CPT | Performed by: HOSPITALIST

## 2025-01-28 PROCEDURE — 200N000001 HC R&B ICU

## 2025-01-28 PROCEDURE — G0463 HOSPITAL OUTPT CLINIC VISIT: HCPCS

## 2025-01-28 PROCEDURE — 999N000287 HC ICU ADULT ROUNDING, EACH 10 MINS

## 2025-01-28 PROCEDURE — 82805 BLOOD GASES W/O2 SATURATION: CPT | Performed by: INTERNAL MEDICINE

## 2025-01-28 PROCEDURE — 258N000003 HC RX IP 258 OP 636: Performed by: STUDENT IN AN ORGANIZED HEALTH CARE EDUCATION/TRAINING PROGRAM

## 2025-01-28 PROCEDURE — 999N000009 HC STATISTIC AIRWAY CARE

## 2025-01-28 PROCEDURE — 85018 HEMOGLOBIN: CPT | Performed by: STUDENT IN AN ORGANIZED HEALTH CARE EDUCATION/TRAINING PROGRAM

## 2025-01-28 PROCEDURE — 999N000065 XR ABDOMEN PORT 1 VIEW

## 2025-01-28 PROCEDURE — 258N000003 HC RX IP 258 OP 636: Mod: JZ

## 2025-01-28 PROCEDURE — 99291 CRITICAL CARE FIRST HOUR: CPT | Mod: 24 | Performed by: INTERNAL MEDICINE

## 2025-01-28 PROCEDURE — 999N000253 HC STATISTIC WEANING TRIALS

## 2025-01-28 PROCEDURE — 999N000157 HC STATISTIC RCP TIME EA 10 MIN

## 2025-01-28 PROCEDURE — 94003 VENT MGMT INPAT SUBQ DAY: CPT

## 2025-01-28 RX ORDER — B COMPLEX C NO.10/FOLIC ACID 900MCG/5ML
5 LIQUID (ML) ORAL DAILY
Status: DISCONTINUED | OUTPATIENT
Start: 2025-01-29 | End: 2025-01-29

## 2025-01-28 RX ORDER — DEXTROSE MONOHYDRATE 100 MG/ML
INJECTION, SOLUTION INTRAVENOUS CONTINUOUS PRN
Status: DISCONTINUED | OUTPATIENT
Start: 2025-01-28 | End: 2025-03-01 | Stop reason: HOSPADM

## 2025-01-28 RX ORDER — CALCITRIOL 1 UG/ML
0.25 SOLUTION ORAL AT BEDTIME
Status: DISCONTINUED | OUTPATIENT
Start: 2025-01-28 | End: 2025-01-29

## 2025-01-28 RX ORDER — HEPARIN SODIUM 10000 [USP'U]/100ML
0-5000 INJECTION, SOLUTION INTRAVENOUS CONTINUOUS
Status: DISCONTINUED | OUTPATIENT
Start: 2025-01-28 | End: 2025-02-11

## 2025-01-28 RX ORDER — ATORVASTATIN CALCIUM 40 MG/1
40 TABLET, FILM COATED ORAL AT BEDTIME
Status: DISCONTINUED | OUTPATIENT
Start: 2025-01-28 | End: 2025-03-01 | Stop reason: HOSPADM

## 2025-01-28 RX ORDER — AMINO ACIDS/PROTEIN HYDROLYS 11G-40/45
1 LIQUID IN PACKET (ML) ORAL 2 TIMES DAILY
Status: DISCONTINUED | OUTPATIENT
Start: 2025-01-28 | End: 2025-01-30

## 2025-01-28 RX ORDER — PIPERACILLIN SODIUM, TAZOBACTAM SODIUM 2; .25 G/10ML; G/10ML
2.25 INJECTION, POWDER, LYOPHILIZED, FOR SOLUTION INTRAVENOUS EVERY 8 HOURS
Status: COMPLETED | OUTPATIENT
Start: 2025-01-28 | End: 2025-02-01

## 2025-01-28 RX ORDER — CLOPIDOGREL BISULFATE 75 MG/1
75 TABLET ORAL EVERY EVENING
Status: DISCONTINUED | OUTPATIENT
Start: 2025-01-28 | End: 2025-03-01 | Stop reason: HOSPADM

## 2025-01-28 RX ORDER — SEVELAMER CARBONATE 0.8 G/1
0.8 POWDER, FOR SUSPENSION ORAL
Status: DISCONTINUED | OUTPATIENT
Start: 2025-01-28 | End: 2025-01-29

## 2025-01-28 RX ORDER — HEPARIN SODIUM 10000 [USP'U]/100ML
0-5000 INJECTION, SOLUTION INTRAVENOUS CONTINUOUS
Status: DISCONTINUED | OUTPATIENT
Start: 2025-01-28 | End: 2025-01-28 | Stop reason: DRUGHIGH

## 2025-01-28 RX ADMIN — PIPERACILLIN AND TAZOBACTAM 2.25 G: 2; .25 INJECTION, POWDER, FOR SOLUTION INTRAVENOUS at 02:08

## 2025-01-28 RX ADMIN — Medication 60 ML: at 20:30

## 2025-01-28 RX ADMIN — OXYCODONE HYDROCHLORIDE 5 MG: 5 TABLET ORAL at 15:13

## 2025-01-28 RX ADMIN — PROPOFOL 30 MCG/KG/MIN: 10 INJECTION, EMULSION INTRAVENOUS at 02:12

## 2025-01-28 RX ADMIN — PROPOFOL 20 MCG/KG/MIN: 10 INJECTION, EMULSION INTRAVENOUS at 10:21

## 2025-01-28 RX ADMIN — MAGNESIUM SULFATE HEPTAHYDRATE 1 G: 500 INJECTION, SOLUTION INTRAMUSCULAR; INTRAVENOUS at 04:21

## 2025-01-28 RX ADMIN — OXYCODONE HYDROCHLORIDE 5 MG: 5 TABLET ORAL at 06:26

## 2025-01-28 RX ADMIN — PIPERACILLIN AND TAZOBACTAM 2.25 G: 2; .25 INJECTION, POWDER, FOR SOLUTION INTRAVENOUS at 09:02

## 2025-01-28 RX ADMIN — OXYCODONE HYDROCHLORIDE 5 MG: 5 TABLET ORAL at 21:25

## 2025-01-28 RX ADMIN — CHLORHEXIDINE GLUCONATE 15 ML: 1.2 SOLUTION ORAL at 08:35

## 2025-01-28 RX ADMIN — CHLORHEXIDINE GLUCONATE 15 ML: 1.2 SOLUTION ORAL at 20:30

## 2025-01-28 RX ADMIN — ATORVASTATIN CALCIUM 40 MG: 40 TABLET, FILM COATED ORAL at 20:30

## 2025-01-28 RX ADMIN — SEVELAMER CARBONATE 0.8 G: 800 POWDER, FOR SUSPENSION ORAL at 18:02

## 2025-01-28 RX ADMIN — ACETAMINOPHEN 975 MG: 325 SUSPENSION ORAL at 14:56

## 2025-01-28 RX ADMIN — VASOPRESSIN 2.4 UNITS/HR: 20 INJECTION, SOLUTION INTRAMUSCULAR; SUBCUTANEOUS at 17:40

## 2025-01-28 RX ADMIN — HEPARIN SODIUM 500 UNITS/HR: 10000 INJECTION, SOLUTION INTRAVENOUS at 02:09

## 2025-01-28 RX ADMIN — POLYETHYLENE GLYCOL 3350 17 G: 17 POWDER, FOR SOLUTION ORAL at 08:34

## 2025-01-28 RX ADMIN — VASOPRESSIN 2.4 UNITS/HR: 20 INJECTION, SOLUTION INTRAMUSCULAR; SUBCUTANEOUS at 00:49

## 2025-01-28 RX ADMIN — Medication 40 MG: at 08:34

## 2025-01-28 RX ADMIN — VASOPRESSIN 2.4 UNITS/HR: 20 INJECTION, SOLUTION INTRAMUSCULAR; SUBCUTANEOUS at 09:02

## 2025-01-28 RX ADMIN — CALCITRIOL 0.25 MCG: 1 SOLUTION ORAL at 20:33

## 2025-01-28 RX ADMIN — CLOPIDOGREL BISULFATE 75 MG: 75 TABLET ORAL at 20:30

## 2025-01-28 RX ADMIN — SENNOSIDES AND DOCUSATE SODIUM 1 TABLET: 50; 8.6 TABLET ORAL at 08:35

## 2025-01-28 RX ADMIN — ACETAMINOPHEN 975 MG: 325 SUSPENSION ORAL at 06:25

## 2025-01-28 RX ADMIN — SEVELAMER CARBONATE 800 MG: 800 TABLET, FILM COATED ORAL at 08:35

## 2025-01-28 RX ADMIN — SENNOSIDES AND DOCUSATE SODIUM 1 TABLET: 50; 8.6 TABLET ORAL at 20:30

## 2025-01-28 RX ADMIN — ACETAMINOPHEN 975 MG: 325 SUSPENSION ORAL at 20:30

## 2025-01-28 RX ADMIN — EPINEPHRINE 0.06 MCG/KG/MIN: 1 INJECTION INTRAMUSCULAR; INTRAVENOUS; SUBCUTANEOUS at 21:24

## 2025-01-28 RX ADMIN — PIPERACILLIN AND TAZOBACTAM 2.25 G: 2; .25 INJECTION, POWDER, FOR SOLUTION INTRAVENOUS at 18:02

## 2025-01-28 RX ADMIN — HEPARIN SODIUM 900 UNITS/HR: 10000 INJECTION, SOLUTION INTRAVENOUS at 16:00

## 2025-01-28 ASSESSMENT — ACTIVITIES OF DAILY LIVING (ADL)
ADLS_ACUITY_SCORE: 73
ADLS_ACUITY_SCORE: 73
ADLS_ACUITY_SCORE: 64
ADLS_ACUITY_SCORE: 73
ADLS_ACUITY_SCORE: 64
ADLS_ACUITY_SCORE: 73
ADLS_ACUITY_SCORE: 77
ADLS_ACUITY_SCORE: 64
ADLS_ACUITY_SCORE: 57
ADLS_ACUITY_SCORE: 64
ADLS_ACUITY_SCORE: 77
ADLS_ACUITY_SCORE: 64
ADLS_ACUITY_SCORE: 64
ADLS_ACUITY_SCORE: 77
ADLS_ACUITY_SCORE: 77
ADLS_ACUITY_SCORE: 64
ADLS_ACUITY_SCORE: 64
ADLS_ACUITY_SCORE: 73
ADLS_ACUITY_SCORE: 64
ADLS_ACUITY_SCORE: 77
ADLS_ACUITY_SCORE: 77

## 2025-01-28 NOTE — CONSULTS
"CLINICAL NUTRITION SERVICES - ASSESSMENT NOTE    Malnutrition Status:    % Intake: < 75% for > 7 days (moderate)  % Weight Loss: Weight loss does not meet criteria   Subcutaneous Fat Loss: Orbital: Severe and Triceps: Severe  Muscle Loss: Wasting of the temples (temporalis muscle): Severe, Clavicles (pectoralis and deltoids): Severe, Shoulders (deltoids): Severe, Thigh (quadriceps): Severe, and Calf (gastrocnemius): Severe  Fluid Accumulation/Edema: None noted  Malnutrition Diagnosis: Severe malnutrition in the context of chronic illness  Malnutrition Present on Admission: Unable to assess    Registered Dietitian Interventions:  Enteral nutrition management - Orders written to begin Novasource Renal at 10 mL/hr and increase every 12 hours by 10 mL to goal of 40 mL/hr= 1920 kcal, 87 g protein, 176 g CHO, 0 g fiber, 688 mL H2O  Management of flushing of feeding tube - Flushes 30 mL every 4 hours   Medical food supplement therapy - ProSource TF20 1 pkt BID = 160 kcal, 40 g protein   Total (TF + ProSource TF20 + Propofol)= 2201 kcal (29 kcal/kg), 127 g protein (1.7 g/kg)  Team meeting involving nutrition professional - Patient discussed today during interdisciplinary bedside rounds       REASON FOR ASSESSMENT  LOS and Provider order - Registered Dietitian to order TF per Medical Nutrition Therapy Guidelines    SUBJECTIVE INFORMATION  Assessed patient in room.    NUTRITION HISTORY  Unable to obtain history as patient intubated  He is awake though and nodded \"yes\" when asked if he was eating OK prior to yesterday's surgery.  Ho    CURRENT NUTRITION ORDERS  Diet: NPO    CURRENT INTAKE/TOLERANCE  N/A    Noted that intake was 75% of meals prior to surgery (1/27)  Reviewed menu ordering system and noted that he was ordering 3 meals per day but they were very small (entree + 1-2 sides)    LABS  Nutrition-relevant labs:  BUN 51.3 (H), Cr 7.65 (H), Phos 8.0 (H) - ESRD on PD     MEDICATIONS  Nutrition-relevant medications: " "Pressor x 2, Propofol at 4.6 mL/hr= 121 kcal, Renal cap    ANTHROPOMETRICS  Height: 6'1\"  Most Recent Weight: 76.2 kg (167 lb 15.9 oz) (1/27)  IBW: 83.6 kg  % IBW: 91%  BMI (kg/m ): Normal BMI (22.16 kg/m^2)  Weight History:   Wt Readings from Last 10 Encounters:   01/27/25 76.2 kg (167 lb 15.9 oz)   01/21/25 75.8 kg (167 lb)   01/03/25 78.9 kg (174 lb)   01/02/25 78.9 kg (173 lb 14.4 oz)   12/27/24 78.9 kg (173 lb 14.4 oz)   12/12/24 78.8 kg (173 lb 12.8 oz)   12/06/24 79.7 kg (175 lb 11.3 oz)   12/05/24 79.4 kg (175 lb 1.6 oz)   12/03/24 80 kg (176 lb 5.9 oz)   11/26/24 76.2 kg (168 lb)     Down 8# or 4.5% in ~ 2 months     Dosing Weight: 76.2 kg, based on actual wt    ASSESSED NUTRITION NEEDS  Estimated Energy Needs: 0100-9440 kcals/day (25 - 30 kcals/kg)  Justification: Maintenance, vented   Estimated Protein Needs: 115-135 grams protein/day (1.5 - 1.8 grams of pro/kg)  Justification:  PD and Hypercatabolism with acute illness  Estimated Fluid Needs: 9887-5081 mL/day (1 mL/kcal)  Justification:  Or per MD     SYSTEM FINDINGS    Skin/wounds: Large necrotic heel ulcer   GI symptoms: N/A    MALNUTRITION  % Intake: < 75% for > 7 days (moderate)  % Weight Loss: Weight loss does not meet criteria   Subcutaneous Fat Loss: Orbital: Severe and Triceps: Severe  Muscle Loss: Wasting of the temples (temporalis muscle): Severe, Clavicles (pectoralis and deltoids): Severe, Shoulders (deltoids): Severe, Thigh (quadriceps): Severe, and Calf (gastrocnemius): Severe  Fluid Accumulation/Edema: None noted  Malnutrition Diagnosis: Severe malnutrition in the context of chronic illness  Malnutrition Present on Admission: Unable to assess    NUTRITION DIAGNOSIS  Inadequate protein energy intake related to NPO with plans to start TF today as evidenced by meeting 0% protein and 6% energy needs from Propofol     INTERVENTIONS  Enteral nutrition management - Orders written to begin Novasource Renal at 10 mL/hr and increase every 12 hours " by 10 mL to goal of 40 mL/hr= 1920 kcal, 87 g protein, 176 g CHO, 0 g fiber, 688 mL H2O  Management of flushing of feeding tube - Flushes 30 mL every 4 hours   Medical food supplement therapy - ProSource TF20 1 pkt BID = 160 kcal, 40 g protein   Total (TF + ProSource TF20 + Propofol)= 2201 kcal (29 kcal/kg), 127 g protein (1.7 g/kg)  Team meeting involving nutrition professional - Patient discussed today during interdisciplinary bedside rounds    Goals  Goal TF regimen will meet % needs      Monitoring/Evaluation  Progress toward goals will be monitored and evaluated per policy    Allyssa Moore, RD, LD, CNSC   Clinical Dietitian - [facility]

## 2025-01-28 NOTE — PROGRESS NOTES
Vascular Surgery Progress Note  01/28/2025       Subjective:  - NAEON. Remains intubated on vasopressin and epi. Not able to wean pressors much overnight. Labs stable.     Objective:  Temp:  [97.6  F (36.4  C)-99.1  F (37.3  C)] 98.4  F (36.9  C)  Pulse:  [] 87  Resp:  [] 14  BP: (103-139)/(72-98) 103/75  MAP:  [69 mmHg-91 mmHg] 75 mmHg  Arterial Line BP: ()/(53-75) 99/60  FiO2 (%):  [60 %] 60 %  SpO2:  [95 %-100 %] 100 %    I/O last 3 completed shifts:  In: 2375.25 [I.V.:1535.25; NG/GT:40]  Out: 200 [Blood:200]      Gen: Intubated, sedated  Resp: Ventilated, not fighting vent  Card: RRR by tele, on 2 pressors  Abd: Groin incision soft with no hematoma  Extr: RLE incision C/D/I, monophasic AT and PT signals, dusky toes     Labs:  Recent Labs   Lab 01/28/25  0425 01/27/25 2226 01/27/25  1852   WBC 16.7* 15.3* 14.4*   HGB 9.2* 9.1* 8.6*    314 330       Recent Labs   Lab 01/28/25  0429 01/28/25  0425 01/28/25  0102 01/27/25  2241 01/27/25 2230 01/27/25 2034 01/27/25  1852   NA  --  134*  --   --  135  --  135   POTASSIUM  --  5.2  --   --  4.8  --  4.3   CHLORIDE  --  98  --   --  98  --  97*   CO2  --  21*  --   --  20*  --  21*   BUN  --  51.3*  --   --  48.6*  --  48.5*   CR  --  7.65*  --   --  7.38*  --  7.24*   * 208* 174*   < > 155*   < > 81   TERENCE  --  9.6  --   --  9.5  --  9.4   MAG  --  1.7  --   --  1.6*  --  1.6*   PHOS  --  8.0*  --   --  6.5*  --  6.1*    < > = values in this interval not displayed.       Imaging:  XR Abdomen Port 1 View   Final Result   IMPRESSION: Enteric tube has been advanced, tip in the gastric antrum, satisfactory positioning. Postoperative changes in the abdomen. Residual contrast material in the colon. Atherosclerotic changes. Right pleural effusion with associated passive    atelectasis in the adjacent right base.      XR Chest Port 1 View   Final Result   IMPRESSION: Interval placement of endotracheal tube, tip 4.8 cm above the suki. Right  IJ catheter has been placed, tip in the SVC. Enteric tube has been placed, tip and side-port below the diaphragm. Moderately large right pleural effusion with    associated passive atelectasis in the adjacent right lung, unchanged. Calcified granuloma left apex. Minor strands of linear atelectasis left base. No effusion on the left. Normal cardiac size. Coronary artery stent. Mild venous congestion.    Atherosclerotic thoracic aorta. Degenerative changes thoracic spine. Previously healed bilateral rib fracture deformities. Monitoring leads overlying the chest.      XR Abdomen Port 1 View   Final Result   IMPRESSION: Orogastric tube tip in the stomach. The sidehole is just below the GE junction.      XR Surgery MOODY L/T 5 Min Fluoro w Stills   Final Result      XR Chest Port 1 View   Final Result   IMPRESSION: Moderate-large right pleural effusion, increased compared to prior. Clear left lung and pleural space. Stable enlarged cardiomediastinal silhouette. No pneumothorax.      MR Ankle Right w/o Contrast   Final Result   IMPRESSION:   1.  There is a soft tissue ulceration over the posterior aspect of the hindfoot with some surrounding edema or cellulitis but no evidence for abscess.   2.  No evidence for osteomyelitis.   3.  Small tibiotalar joint effusion.   4.  No evidence for fracture.   5.  Acute on chronic plantar fasciitis. Plantar calcaneal spurring.   6.  No additional tendinous or ligamentous pathology.            US Thoracentesis   Final Result   IMPRESSION:   Status post right ultrasound-guided thoracentesis.         IR Lower Extremity Angiogram Right   Final Result      CTA Abdomen Pelvis Runoff w Contrast   Final Result   IMPRESSION:   1.  Occluded distal right superficial femoral artery and an popliteal artery as above including stents in the SFA and popliteal arteries. The tibial arteries are difficult to evaluate due to poor flow and calcified plaque. The distal anterior tibial and    peroneal  arteries are patent.   2.  Patent left common femoral artery and common femoral artery to tibioperoneal trunk bypass graft without evidence for significant stenosis.   3.  Large right pleural effusion and small left pleural effusion.   4.  Ascites most likely relates to a peritoneal dialysis catheter.   5.  Colonic diverticulosis.      XR Calcaneus Right G/E 2 Views   Final Result   IMPRESSION: Soft tissue ulcer plantar to the calcaneus. There is diffuse demineralization without discrete erosion. MRI would be more sensitive for early findings of osteomyelitis. Plantar calcaneal spur. Achilles enthesophyte. Vascular calcifications.      US Lower Extremity Venous Mapping Right   Final Result   IMPRESSION:   1.  Right great saphenous vein measurements as described, relatively small diameters. Areas of echogenic walls, may relate to chronic superficial thrombophlebitis.      US Upper Extremity Venous Mapping Bilateral   Final Result   IMPRESSION: Bilateral upper extremity vein measurements as described. Both cephalic veins are relatively small in caliber.         US Lower Extremity Arterial Duplex Right   Final Result   IMPRESSION:   1.  No blood flow demonstrated in the distal superficial femoral, popliteal, and runoff arteries. There may be trickle blood flow in the distal runoff arteries, though difficult to distinguish from artifact.      2.  Uncertain if stent is in the right lower extremity. Previous angiogram images are not available for review.              Assessment/Plan:   64 year old male with RLE rest pain and dry gangrene of the heel now s/p R fem-BK pop bypass with PTFE and GSV. Bypass appears to be patent this morning based on exam. Remains intubated and sedated.    - Weaning vent and pressors this morning, goal for trial of extubation today  - Heparin gtt at 500u/hr. Will recheck CBC at 12:00 and if stable will increase heparin gtt to low intensity  - Appreciate podiatry input regarding timing of heel  debridement. Very few further options for revascularization if this bypass were to fail or be insufficient to heal planned debridement     Seen, examined, and discussed with staff.  - - - - - - - - - - - - - - - - - -  Milo Carrion MD  Vascular Surgery Resident    STAFF: As above.  ICU on ventilator.   Right groin and right calf incisions=A   Doppler signals appreciated within PTFE graft and thigh and also distal AT.   Foot is much warmer but still signs of ischemia due to limited arterial outflow.    Chest x-ray reveals a moderate right pleural effusion along with atelectasis versus pneumonia in the right lung with the left lung being clear.    IMPRESSION: #1.  Patent right leg bypass graft.  Outflow primarily via a very diseased anterior tibial artery.  Nothing further can be done to improve the blood supply.  Will keep patient on intravenous heparin due to the limited outflow.       #2.  Podiatry will continue to evaluate the patient.  He has a large dry eschar on his heel plus some other superficial changes.  Will decide on timing of debridement.  Patient and his son who I spoke to postoperatively last evening they are aware that limb salvage may not be feasible.     #3.  End-stage renal disease on peritoneal dialysis.     #4.  Palpable pulse and well-functioning left leg bypass with no ischemic changes to foot.     #5.  Worsening pulmonary situation.  Discussed with ICU staff.  Patient remains intubated.  May need thoracentesis and they will evaluate this further.  On IV Zosyn.       Patrick Hein MD

## 2025-01-28 NOTE — PLAN OF CARE
Problem: Adult Inpatient Plan of Care  Goal: Absence of Hospital-Acquired Illness or Injury  Intervention: Prevent and Manage VTE (Venous Thromboembolism) Risk  Recent Flowsheet Documentation  Taken 1/28/2025 0400 by Tram Baires RN  VTE Prevention/Management: SCDs on (sequential compression devices)  Taken 1/28/2025 0000 by Tram Baires RN  VTE Prevention/Management: SCDs on (sequential compression devices)  Taken 1/27/2025 2245 by Tram Baires RN  VTE Prevention/Management: SCDs on (sequential compression devices)     Problem: Adult Inpatient Plan of Care  Goal: Plan of Care Review  Description: The Plan of Care Review/Shift note should be completed every shift.  The Outcome Evaluation is a brief statement about your assessment that the patient is improving, declining, or no change.  This information will be displayed automatically on your shift  note.  Flowsheets (Taken 1/28/2025 0452)  Plan of Care Reviewed With: patient  Overall Patient Progress: improving   Goal Outcome Evaluation:      Plan of Care Reviewed With: patient    Overall Patient Progress: improvingOverall Patient Progress: improving   Patient is sedated and intubated, fallow commands intermittent, withdrawal from pain.  Doppler  pulses Q 2 H overnight. SR, LS diminish. Heparin drip restarted at 0200 per order. Magnesium replaced. Patient is on Vasopressin and Norepinephrine, peritoneal dialysis, was not run overnight per intensives will be addressed at AM with Nephrology. Patient was bladder scanned for 262 ml. Pt was repositioning Q 2 H.  Continue with plan of care.

## 2025-01-28 NOTE — ANESTHESIA PREPROCEDURE EVALUATION
Anesthesia Pre-Procedure Evaluation    Patient: Arnulfo Pritchett   MRN: [id number] : 1960        Procedure : Procedure(s):  CREATION OF RIGHT FEMORAL ARTERY TO RIGHT POLITEAL ARTERY BYPASS USING ARTEGRAFT  INTRAOPERATIVE ANGIOGRAM          Past Medical History:   Diagnosis Date     CAD (coronary artery disease)      Chronic systolic heart failure (H)      ESRD (end stage renal disease) on dialysis (H)      Gastroesophageal reflux disease without esophagitis      Gout      HLD (hyperlipidemia)      TALI (obstructive sleep apnea)      PAD (peripheral artery disease)     S/p RLE stenting of SFA/popliteal arteries     Type 2 diabetes mellitus with diabetic neuropathy (H)       Past Surgical History:   Procedure Laterality Date     AMPUTATE TOE(S) Left 10/27/2024    Procedure: AMPUTATION, TOE left great toe;  Surgeon: Haley Joseph DPM, Podiatry/Foot and Ankle Surgery;  Location: [facility] OR     BYPASS GRAFT FEMOROTIBIAL Left 10/25/2024    Procedure: LEFT FEMORAL ENDARTERECTOMY, LEFT FEMORAL TO TIBIAL PERONEAL TRUNK BYPASS WITH LEFT GREAT SEPHANEOUS VEIN, WOUND VAC PLACEMENT ON LEFT GROIN.;  Surgeon: Raul Gray MD;  Location:  OR     BYPASS GRAFT FEMOROTIBIAL Left 10/27/2024    Procedure: CREATION, BYPASS, VASCULAR, FEMORAL TO TIBIAL REVISION OF BYPASS LEFT COMMON FEMORAL TO TIBIAL.;  Surgeon: Raul Gray MD;  Location:  OR     CV CORONARY ANGIOGRAM N/A 2024    Procedure: Coronary Angiogram;  Surgeon: Clem Matt MD;  Location:  [facility]     CV LOWER EXTREMITY ANGIOGRAM LEFT Left 10/27/2024    Procedure: Angiogram Lower Extremity Left;  Surgeon: Raul Gray MD;  Location:  OR     CV PCI N/A 2024    Procedure: Percutaneous Coronary Intervention;  Surgeon: Clem Matt MD;  Location:  [facility]     IR LOWER EXTREMITY ANGIOGRAM LEFT  10/17/2024     IR LOWER EXTREMITY ANGIOGRAM RIGHT  2025      No Known Allergies    Social History     Tobacco Use     Smoking status: Former     Types: Cigarettes     Smokeless tobacco: Never   Substance Use Topics     Alcohol use: Not Currently     Comment: quit 20 years      Wt Readings from Last 1 Encounters:   01/27/25 76.2 kg (167 lb 15.9 oz)        Anesthesia Evaluation   Pt has had prior anesthetic.     No history of anesthetic complications       ROS/MED HX  ENT/Pulmonary: Comment: Recent right pleural effusion s/p thoracentesis on 1/24/25. Patient reports increased WOB.    (+) sleep apnea,               tobacco use,              recent URI, resolved, on 1/3, s/p treatment:        Neurologic:     (+)              TIA,  features: no residual symptoms.                Cardiovascular:     (+) Dyslipidemia hypertension- Peripheral Vascular Disease (s/p left common femoral artery endarterecomy and femoral-pop bypass in 10/2024)-- Other and Symptomatic.  CAD -  - stent-      CHF etiology: ICM Last EF: 20-25 date: 6/2024                             METS/Exercise Tolerance:     Hematologic:       Musculoskeletal:       GI/Hepatic:     (+) GERD,                   Renal/Genitourinary: Comment: S/p right nephrectomy d/t RCC    (+) renal disease, type: ESRD, Pt requires dialysis, type: Peritoneal dialysis,          Endo:     (+)  type II DM, Last HgA1c: 5.7, date: 10/2024,                  Psychiatric/Substance Use:       Infectious Disease:       Malignancy:   (+) Malignancy, History of Other.Other CA RCC s/p right nephrectomy Remission status post Surgery.    Other:            Physical Exam    Airway        Mallampati: II   TM distance: > 3 FB   Neck ROM: full   Mouth opening: > 3 cm    Respiratory Devices and Support         Dental       (+) Multiple crowns, permanant bridges      Cardiovascular          Rhythm and rate: tachycardia     Pulmonary       Comment: No breath sounds heard in RLL    (-) no decreased breath sounds      OUTSIDE LABS:  CBC:   Lab Results   Component Value Date    WBC 15.2  "(H) 01/25/2025    WBC 12.6 (H) 01/24/2025    HGB 9.8 (L) 01/25/2025    HGB 10.4 (L) 01/24/2025    HCT 29.3 (L) 01/25/2025    HCT 32.0 (L) 01/24/2025     01/25/2025     01/24/2025     BMP:   Lab Results   Component Value Date     (L) 01/27/2025     (L) 01/24/2025    POTASSIUM 4.5 01/27/2025    POTASSIUM 4.5 01/24/2025    CHLORIDE 95 (L) 01/27/2025    CHLORIDE 93 (L) 01/24/2025    CO2 24 01/27/2025    CO2 26 01/24/2025    BUN 48.7 (H) 01/27/2025    BUN 52.6 (H) 01/24/2025    CR 7.43 (H) 01/27/2025    CR 7.52 (H) 01/24/2025    GLC 92 01/27/2025     (H) 01/27/2025     COAGS:   Lab Results   Component Value Date    PTT 32 11/27/2024    INR 1.46 (H) 01/23/2025     POC: No results found for: \"BGM\", \"HCG\", \"HCGS\"  HEPATIC:   Lab Results   Component Value Date    ALBUMIN 2.7 (L) 01/21/2025    PROTTOTAL 5.7 (L) 01/24/2025    ALT 14 01/21/2025    AST 17 01/21/2025    ALKPHOS 246 (H) 01/21/2025    BILITOTAL 0.2 01/21/2025     OTHER:   Lab Results   Component Value Date    LACT 1.0 01/21/2025    A1C 5.7 (H) 10/16/2024    TERENCE 9.9 01/27/2025    PHOS 5.3 (H) 11/09/2024    MAG 1.5 (L) 01/03/2025    TSH 6.45 (H) 11/27/2024    T4 1.38 11/27/2024    SED 86 (H) 01/21/2025       Anesthesia Plan    ASA Status:  4    NPO Status:  NPO Appropriate    Anesthesia Type: General.     - Airway: ETT         Techniques and Equipment:     - Airway: Video-Laryngoscope     - Lines/Monitors: 2nd IV, Arterial Line, Central Line, CVP     - Drips/Meds: Norepi, Epinephrine, Vasopressin     Consents    Anesthesia Plan(s) and associated risks, benefits, and realistic alternatives discussed. Questions answered and patient/representative(s) expressed understanding.     - Discussed: Risks, Benefits and Alternatives for BOTH SEDATION and the PROCEDURE were discussed     - Discussed with:  Patient      - Extended Intubation/Ventilatory Support Discussed: Yes.      - Patient is DNR/DNI Status: No     Use of blood products " discussed: Yes.     - Discussed with: Patient.     - Consented: consented to blood products     Postoperative Care            Comments:    Other Comments: Patient with increased work of breathing and no breath sounds in RLL. No CXR obtained after the thoracentesis on 1/24. CXR obtained in preop and showed a larger pleural effusion than on 1/24. Discussed with Dr. Hein and we will proceed with surgery d/t the urgency of the gangrenous leg. We will not extubate tonight as the patient will likely have oxygenation/ventilation issues with the recurrent pleural effusion. Discussed prolonged intubation with patient and son and they agree to the plan. Will need ICU bed.    Arterial line d/t EF of 20%. Central line as patient will likely require vasopressor/ionotrope support during and after the procedure.     No TNS or recent BMP ordered even though patient is scheduled for a major vascular procedure and is on PD. Will obtain labs prior to going back to the OR and treat as needed.    Limit fluids d/t ESRD/PD. Will give albumin if fluid is needed.    Have vasopressor in line on induction.         Brissa Chahal MD    I have reviewed the pertinent notes and labs in the chart from the past 30 days and (re)examined the patient.  Any updates or changes from those notes are reflected in this note.    Clinically Significant Risk Factors         # Hyponatremia: Lowest Na = 133 mmol/L in last 2 days, will monitor as appropriate  # Hypochloremia: Lowest Cl = 95 mmol/L in last 2 days, will monitor as appropriate      # Hypoalbuminemia: Lowest albumin = 2.7 g/dL at 1/21/2025  4:06 PM, will monitor as appropriate     # Hypertension: Noted on problem list                # Financial/Environmental Concerns:

## 2025-01-28 NOTE — ANESTHESIA PROCEDURE NOTES
Airway       Patient location during procedure: OR       Procedure Start/Stop Times: 1/27/2025 5:18 PM  Staff -        Anesthesiologist:  Brissa Chahal MD       CRNA: Dominique Nath APRN CRNA       Performed By: CRNA  Consent for Airway        Urgency: elective  Indications and Patient Condition       Indications for airway management: alexsandra-procedural       Induction type:intravenous       Mask difficulty assessment: 2 - vent by mask + OA or adjuvant +/- NMBA    Final Airway Details       Final airway type: endotracheal airway       Successful airway: ETT - single and Single subglottic suction  Endotracheal Airway Details        ETT size (mm): 8.0       Cuffed: yes       Successful intubation technique: video laryngoscopy       VL Blade Size: Lopez 4       Grade View of Cords: 1       Adjucts: stylet       Position: Right       Measured from: lips       Secured at (cm): 23       Bite block used: None    Post intubation assessment        Placement verified by: capnometry, equal breath sounds and chest rise        Number of attempts at approach: 1       Secured with: tape       Ease of procedure: easy       Dentition: Intact and Unchanged    Medication(s) Administered   Medication Administration Time: 1/27/2025 5:18 PM

## 2025-01-28 NOTE — ANESTHESIA PROCEDURE NOTES
Arterial Line Procedure Note    Pre-Procedure   Staff -        Anesthesiologist:  Brissa Chahal MD       Performed By: anesthesiologist       Pre-Anesthestic Checklist: patient identified, IV checked, risks and benefits discussed, informed consent, monitors and equipment checked, pre-op evaluation and at physician/surgeon's request  Timeout:       Correct Patient: Yes        Correct Procedure: Yes        Correct Site: Yes        Correct Position: Yes   Line Placement:   This line was placed Pre Induction starting at 1/27/2025 4:17 PM and ending at 1/27/2025 4:23 PM  Procedure   Procedure: arterial line       Laterality: right       Insertion Site: radial.  Sterile Prep        All elements of maximal sterile barrier technique followed       Patient Prep/Sterile Barriers: hand hygiene, sterile gloves, hat, mask, draped, sterile gown, draped       Skin prep: Chloraprep  Insertion/Injection        Technique: Seldinger Technique, ultrasound guided and Viraj's test completed        1. Ultrasound was used to evaluate the access site.       2. Artery evaluated via ultrasound for patency/adequacy.       3. Using real-time ultrasound the needle/catheter was observed entering the artery/vein.       4. Permanent image was captured and entered into the patient's record.       5. The visualized structures were anatomically normal.       6. There were no apparent abnormal pathologic findings.       Catheter Type/Size: 20 gauge, 1.75 in/4.5 cm quick cath (integral wire)  Narrative         Secured by: other       Tegaderm dressing used.       Complications: None apparent,        Arterial waveform: Yes        IBP within 10% of NIBP: Yes   Comments:  Ultrasound was used to place arterial line due to Art Line US Reasons: unable to palpate artery.

## 2025-01-28 NOTE — PROGRESS NOTES
Renal Medicine Progress Note            Assessment/Plan:     Arnulfo Pritchett is a 64 year old male CAD, HTN, HLD, pafib, HFrEF (EF 20-25%), ESRD on PD, DM2, TALI, RCC s/p R nephrectomy (2022), PAD with multiple LE procedures      1 End stage renal disease on PD  Chronic PD for last 3.5 years.  Home unit [facility], nephrologist Dr. May  Rx: 5 cycles, 2 L fill volumes, 9 hours, last fill 1.2 L with external.     2 PAD, right heel gangrene, occluded right SFA       S/P : 1.   Right common femoral and below-knee popliteal/tibial endarterectomy                        2. Right common femoral to below-knee popliteal/tibial composite greater saphenous vein-6 mm   ringed PTFE Propaten bypas  Other issues-  Diabetes mellitus  Chronic paroxysmal atrial fibrillation  Secondary hyperparathyroidism and hyperphosphatemia  Severe heart failure with EF of 30% and mild RV dysfunction.      Plan:  # Same PD order  # Repeat up right CXR to eval degree of right pleural effusion when able. He  may needs a thoracentesis.   # Wean pressors  # Phos binder with meals    I discussed the plan with the ICU team in person.           Interval History:     Afebrile.   BP is soft, but that is to expected with severe systolic heart failure.   Lactic acid level is normal.  On Epi at 0.04 mcg and vaso at 2.4.   Pt is intubated, awake and following verbal commands.  FIO2 at 45%.   Patient did not get PD last night.             Medications and Allergies:     Current Facility-Administered Medications   Medication Dose Route Frequency Provider Last Rate Last Admin    - MEDICATION INSTRUCTIONS for Dialysis Patients -   Does not apply See Admin Instructions Melissa Macias MD        acetaminophen (TYLENOL) solution 975 mg  975 mg Oral or NG Tube Q8H Milo Carrion MD   975 mg at 01/28/25 0625    [Held by provider] allopurinol (ZYLOPRIM) tablet 200 mg  200 mg Oral QPM Milo Carrion MD   200 mg at 01/26/25 1957    atorvastatin  (LIPITOR) tablet 40 mg  40 mg Oral At Bedtime Milo Carrion MD   40 mg at 01/26/25 2207    calcitRIOL (ROCALTROL) capsule 0.25 mcg  0.25 mcg Oral At Bedtime Milo Carrion MD   0.25 mcg at 01/26/25 2207    chlorhexidine (PERIDEX) 0.12 % solution 15 mL  15 mL Mouth/Throat Q12H Patricia Dykes APRN CNP   15 mL at 01/28/25 0835    cinacalcet (SENSIPAR) tablet 60 mg  60 mg Oral Q Mon Wed Fri AM Milo Carrion MD   60 mg at 01/24/25 2151    clopidogrel (PLAVIX) tablet 75 mg  75 mg Oral QPM Patricia Dykes APRN CNP   75 mg at 01/26/25 1957    insulin aspart (NovoLOG) injection (RAPID ACTING)  1-7 Units Subcutaneous Q4H Patricia Dykes APRN CNP   2 Units at 01/28/25 0834    [Held by provider] insulin glargine (LANTUS PEN) injection 15 Units  15 Units Subcutaneous At Bedtime Milo Carrion MD   15 Units at 01/26/25 2208    [Held by provider] methocarbamol (ROBAXIN) tablet 750 mg  750 mg Oral or NG Tube 4x Daily Milo Carrion MD        [Held by provider] metoprolol succinate ER (TOPROL XL) 24 hr tablet 25 mg  25 mg Oral Daily Milo Carrion MD   25 mg at 01/27/25 0848    multivitamin RENAL (TRIPHROCAPS) capsule 1 capsule  1 capsule Oral Daily Milo Carrion MD   1 capsule at 01/27/25 0848    pantoprazole (PROTONIX) 2 mg/mL suspension 40 mg  40 mg Per Feeding Tube QAM  Patricia Dykes APRN CNP   40 mg at 01/28/25 0834    Or    pantoprazole (PROTONIX) IV push injection 40 mg  40 [facility] Dykes APR[facility] CNP        piperacillin-tazobactam (ZOSYN) 2.25 g vial to attach to  ml bag  2.25 g Intravenous Q8H Patricia Dykes APRN  mL/hr at 01/26/25 1755 2.25 g at 01/28/25 0902    polyethylene glycol (MIRALAX) Packet 17 g  17 g Oral or NG Tube Daily Milo Carrion MD   17 g at 01/28/25 0834    senna-docusate (SENOKOT-S/PERICOLACE) 8.6-50 MG per tablet 1 tablet  1 tablet Oral or NG Tube BID Milo Carrion MD   1 tablet at 01/28/25 0835    sevelamer carbonate (RENVELA) tablet 800  mg  800 mg Oral TID w/meals Milo Carrion MD   800 mg at 01/28/25 0835    sodium chloride (PF) 0.9% PF flush 3 mL  3 mL Intracatheter Q8H Milo Carrion MD   3 mL at 01/28/25 0628    sodium chloride (PF) 0.9% PF flush 3 mL  3 mL Intracatheter Q8H Milo Carrion MD   3 mL at 01/28/25 0538      No Known Allergies         Physical Exam:   Vitals were reviewed   , Blood pressure 103/75, pulse 87, temperature 98.4  F (36.9  C), temperature source Oral, resp. rate 14, weight 76.2 kg (167 lb 15.9 oz), SpO2 100%.    Wt Readings from Last 3 Encounters:   01/27/25 76.2 kg (167 lb 15.9 oz)   01/21/25 75.8 kg (167 lb)   01/03/25 78.9 kg (174 lb)       Intake/Output Summary (Last 24 hours) at 1/28/2025 1003  Last data filed at 1/28/2025 0900  Gross per 24 hour   Intake 2775.25 ml   Output 200 ml   Net 2575.25 ml       GENERAL APPEARANCE: NAD  HEENT:  Eyes/ears/nose/neck grossly normal. Intubated  RESP: lungs cta b c good efforts, no crackles, rhonchi or wheezes  CV: RRR  ABDOMEN: soft, NT  EXTREMITIES/SKIN: No edema  NEURO: Awake, alert and following verbal commands         Data:     CBC RESULTS:     Recent Labs   Lab 01/28/25  0425 01/27/25  2226 01/27/25  1852 01/25/25  0735 01/24/25  0425 01/22/25  1451   WBC 16.7* 15.3* 14.4* 15.2* 12.6* 13.6*   RBC 2.99* 2.97* 2.75* 3.04* 3.36* 3.94*   HGB 9.2* 9.1* 8.6* 9.8* 10.4* 12.2*   HCT 28.3* 28.0* 26.1* 29.3* 32.0* 38.0*    314 330 405 409 457*       Basic Metabolic Panel:  Recent Labs   Lab 01/28/25  0831 01/28/25  0429 01/28/25  0425 01/28/25  0102 01/27/25  2241 01/27/25  2230 01/27/25  2034 01/27/25  1852 01/27/25  1618 01/24/25  0838 01/24/25  0425 01/22/25  0952 01/22/25  0733   NA  --   --  134*  --   --  135  --  135 133*  --  133*  --  131*   POTASSIUM  --   --  5.2  --   --  4.8  --  4.3 4.5  --  4.5  --  4.2   CHLORIDE  --   --  98  --   --  98  --  97* 95*  --  93*  --  93*   CO2  --   --  21*  --   --  20*  --  21* 24  --  26  --  25   BUN  --   --  51.3*   --   --  48.6*  --  48.5* 48.7*  --  52.6*  --  54.0*   CR  --   --  7.65*  --   --  7.38*  --  7.24* 7.43*  --  7.52*  --  7.65*   * 204* 208* 174* 142* 155*   < > 81 92   < > 257*   < > 129*   TERENCE  --   --  9.6  --   --  9.5  --  9.4 9.9  --  9.3  --  9.8    < > = values in this interval not displayed.       INR  Recent Labs   Lab 01/27/25  2230 01/23/25  1517 01/23/25  0449 01/22/25  2157   INR 1.77* 1.46* 1.65* 1.78*      Attestation:   I have reviewed today's relevant vital signs, notes, medications, labs and imaging.    Star Acuna MD  [facility] - Nephrology  Office phone :[phone]  Pager: [phone]

## 2025-01-28 NOTE — H&P
Critical Care  Note      01/27/2025    Name: Arnulfo Pritchett MRN#: [id number]   Age: 64 year old YOB: 1960     Hsptl Day# 6  ICU DAY #    MV DAY #             Problem List:   Active Problems:    Cellulitis           Summary/Hospital Course:   HPI - Patient intubated on arrival to ICU from surgery. HPI collected per prior hospitalization course and chart review.    Pt presented from outside hospital with R leg pain and shortness of breathe where he was found to have a R leg ulcer on 01/03/2025. He was transferred to [facility] for vascular surg eval on 01/21/2025. Pt was treated for PNA during hospitalization course with vascular surgery planning urgent bypass of R common femoral to popliteal artery with PTFE graft and foot debridement.    ROS - Unable due collect to as patient is intubated.      Assessment and plan :     Arnulfo Pritchett IS a 64 year old male w pmhx of HFrEF (20-25%), ESRD, pAF on warfarin, LLE PAD s/p endarterectomy, RCC s/p nephrectomy admitted on 1/21/2025 for septic shock 2/2 right leg PAD with worsening R heel necrotic ulcer. ***  I have personally reviewed the daily labs, imaging studies, cultures and discussed the case with referring physician and consulting physicians.     My assessment and plan by system for this patient is as follows:    Neurology/Psychiatry:   Sedation for intubation  Post-op pain management  Hx of TIA. No known residual symptoms.  Plan  - Palliative following, appreciate it. Goals of care conference on 01/29.  - Sedation on Propofol with Fentanyl pushes. Titrate to RAAS goals of -2 to -1.  - APAP prn for pain control.      Cardiovascular:  Shock, likely septic 2/2 to known necrotic R heel ulcer. Pt on epinephrine, levo and vaso on admission.  R PAD s/p bypass of R common femoral to popliteal artery  Hx of HFrEF with EF of 20-25% (last ECHO on 06/2024)  Hx of pAF s/p ablation and on warfarin.  Hx of HLD  Hx of CAD s/p multiple stents  Hx of PAD of LLE s/p  endarterectomy and fem-tibioperoneal trunk bypass on 10/25/24.  Hx of RLE arterial occlusion s/p SFA angioplasty and stent (05/2024).  Plan  - Continue to manage on Epi, Levo and Vasopressin. MAP goals > 65. Can consider weaning on epinephrine prior to levo and vasopressin given hx of pAF.  - Continue Heparin gtt at 0200 and PTA plavix in consultation with vascular surgery. Warfarin was reversed by vascular.  - INR in AM  - Limited fluids given EF of 20%.  - Hold metoprolol until pressor needs resolved.  - Serial extremity assessments q4h. Low concern for reperfusion injury or compartment syndrome given chronic nature of the vascular injury.      Pulmonary/Ventilator Management:   Acute hypoxic respiratory failure. Pt intubated in OR for 01/27 procedure. During prior hospitalization course, pt was treated for presumed PNA with intermittent nasal cannula needs.  Suspected pneumonia, resolved. Pt was treated with course of cefdinir for presumed CAP during hospitalization.  Recurrent R pleural effusion. Hx of large R pleural effusions s/p thoracentesis (01/24/25) with 1.5L removed. Possibly 2/2 to reduced PD during hospitalization with ESRD versus infectious etiology versus less likely new hepatic injury.  Hx of TALI. Does not use BiPAP at baseline.   Plan  -CXR on 01/27 showing worsening R pleural effusion compared to 01/21 CXR. Plan to repeat in AM. Repeat CXR in AM and consider repeat thoracentesis with fluid analysis.  - Wean vent settings as tolerated.  - ABG in AM. No acute concerns on 01/27 repeats.  - Minimize diuretics at this time given pressor requirements. Can consider once improvement to BP.    GI and Nutrition :   No acute concerns  Plan  - PPI for ulcer prevention  - Place OG tube  - Start enteral feedings in AM in consultation with nutrition if not extubated.    Renal/Fluids/Electrolytes:   ESRD on PD. Cr of 7.24 on 01/27 (baseline in 7s). Received PD today.  Hx of RCC s/p R Nephrectomy  Hypoalbuminemia.  Albumin of 2.3 on 01/27, downtrending from 3.0. Possibly secondary to wasting from acute infection, will trend and plan to replace if blood pressures not managed on fluids and pressors.  Plan  - Recheck BMP  - Continue sensipar and sevelamer PTA.  - Nephrology following, appreciate it.    Infectious Disease:   R heel necrotic ulcer and gangrenous leg. Pt undergoing bypass of R fem to R popliteal artery using artegraft.  Pneumonia, resolved. Pt completed course of cefdinir during hospitalization with no new acute concerns.  MRSA positive nasal swab on 01/21/2025.  Plan  - Continue on Zosyn 2.25g IV q6h for total 10 day course (end 02/05). Can continue expanding with Vanc if worsening condition.  - Trend lactate. Can consider ESR and CRP if worsening condition.  - WOC consult.  - Bcx x2    Endocrine:   Hx of T2DM (last A1C of 5.7 on 10/2024)  Hx of Hyperparathyroidism  Plan  - sliding scale insulin  - Lantus and Dextrose held at this time. Dextrose held given EF.    Hematology/Oncology:   Anemia of Chronic Disease. Hgb of 8.6 on 01/27 downtrending from 12.2 on 01/22.  Leukocytosis. WBC of 14.4 on 01/27, 2/2 to known necrotic ulcer of R heel.  Plan  - Repeat CBC and serial AM CBC. Given procedure, will plan to replace blood at HgB < 8.     IV/Access:   1. Venous access - CVC R internal jugular, 1x PIV.  2. Arterial access - R radial line  3. ETT - 8mm  4. Peritoneal Dialysis Catheter  Plan  - No additional access requirements at this time.      ICU Prophylaxis:   1. DVT: Hep Subq/ LMWH/mechanical  2. VAP: HOB 30 degrees, chlorhexidine rinse  3. Stress Ulcer: PPI/H2 blocker  4. Restraints: Nonviolent soft two point restraints required and necessary for patient safety and continued cares and good effect as patient continues to pull at necessary lines, tubes despite education and distraction. Will readdress daily.   5. Wound care  - WOC consulted.  6. Feeding - Will evaluate in AM.  7. Family Update: Son to be  updated.  8. Disposition - ICU care with care conference.    Clinically Significant Risk Factors   { TIP  Diagnoses will continue to appear throughout the admission.  :[id number]}      # Hyponatremia: Lowest Na = 133 mmol/L in last 2 days, will monitor as appropriate  # Hypochloremia: Lowest Cl = 95 mmol/L in last 2 days, will monitor as appropriate   # Hypercalcemia: corrected calcium is >10.1, will monitor as appropriate  # Hypomagnesemia: Lowest Mg = 1.6 mg/dL in last 2 days, will replace as needed   # Hypoalbuminemia: Lowest albumin = 2.3 g/dL at 1/27/2025  6:52 PM, will monitor as appropriate     # Hypertension: Noted on problem list                # Financial/Environmental Concerns:                               Key Medications:     Current Facility-Administered Medications   Medication Dose Route Frequency Provider Last Rate Last Admin    [Auto Hold] allopurinol (ZYLOPRIM) tablet 200 mg  200 mg Oral QPM Lizett Davis MD   200 mg at 01/26/25 1957    [Auto Hold] atorvastatin (LIPITOR) tablet 40 mg  40 mg Oral At Bedtime Lizett Davis MD   40 mg at 01/26/25 2207    [Auto Hold] calcitRIOL (ROCALTROL) capsule 0.25 mcg  0.25 mcg Oral At Bedtime Lizett Davis MD   0.25 mcg at 01/26/25 2207    [Auto Hold] cinacalcet (SENSIPAR) tablet 60 mg  60 mg Oral Q Mon Wed Fri AM Lizett Davis MD   60 mg at 01/24/25 2151    [Auto Hold] clopidogrel (PLAVIX) tablet 75 mg  75 mg Oral QPM Lizett Davis MD   75 mg at 01/26/25 1957    [Auto Hold] insulin aspart (NovoLOG) injection (RAPID ACTING)  1-7 Units Subcutaneous TID AC Lizett Davis MD   1 Units at 01/26/25 1256    [Auto Hold] insulin aspart (NovoLOG) injection (RAPID ACTING)  1-5 Units Subcutaneous At Bedtime Lizett Davis MD   2 Units at 01/25/25 2221    [Auto Hold] insulin glargine (LANTUS PEN) injection 15 Units  15 Units Subcutaneous At Bedtime Lizett Davis MD   15 Units at 01/26/25 2208    [Auto Hold] metoprolol succinate ER (TOPROL-XL) 24 hr half-tab 25 mg  25 mg  Oral Daily Lizett Davis MD   25 mg at 01/27/25 0848    [Auto Hold] multivitamin RENAL (TRIPHROCAPS) capsule 1 capsule  1 capsule Oral Daily Lizett Davis MD   1 capsule at 01/27/25 0848    [Auto Hold] pantoprazole (PROTONIX) EC tablet 40 mg  40 mg Oral QAM AC Lizett Davis MD   40 mg at 01/27/25 0848    [Auto Hold] piperacillin-tazobactam (ZOSYN) 2.25 g vial to attach to  ml bag  2.25 g Intravenous Q8H Lizett Davis  mL/hr at 01/26/25 1755 2.25 g at 01/27/25 1750    [Auto Hold] sevelamer carbonate (RENVELA) tablet 800 mg  800 mg Oral TID w/meals Lizett Davis MD   800 mg at 01/26/25 1756    [Auto Hold] sodium chloride (PF) 0.9% PF flush 3 mL  3 mL Intracatheter Q8H Lizett Davis MD   3 mL at 01/26/25 2122     Current Facility-Administered Medications   Medication Dose Route Frequency Provider Last Rate Last Admin    [Auto Hold] Continuing statin from home medication list OR statin order already placed during this visit   Does not apply DOES NOT GO TO Lizett Sykes MD        dextrose 5% and 0.9% NaCl infusion   Intravenous Continuous Alejandro Schwarz MD 75 mL/hr at 01/27/25 1331 New Bag at 01/27/25 1331    [Auto Hold] dianeal PD LOW calcium-1.5% dex (calcium 2.5 mEq/L) 6,000 mL PERITONEAL DIALYSATE   Dialysis [facility] Supplied PD Jennifer Jameson MD        [Auto Hold] dianeal PD LOW calcium-2.5% dex (calcium 2.5 mEq/L) 6,000 mL PERITONEAL DIALYSATE   Dialysis [facility] Supplied PD Jennifer Jameson MD        heparin 25,000 units in 0.45% NaCl 250 mL ANTICOAGULANT infusion  0-5,000 Units/hr Intravenous Continuous Lizett Davis MD   Stopped at 01/27/25 1410    Patient is already receiving anticoagulation with heparin, enoxaparin (LOVENOX), warfarin (COUMADIN)  or other anticoagulant medication   Does not apply Continuous PRN Lizett Davis MD        sodium chloride 0.9 % infusion   Intravenous Continuous Jimbo Devi MD 10 mL/hr at 01/27/25 1945 Rate Change at 01/27/25 1945              Physical  Examination:   Temp:  [97.6  F (36.4  C)-98.7  F (37.1  C)] 97.9  F (36.6  C)  Pulse:  [] 84  Resp:  [] 113  BP: (116-139)/(72-98) 122/72  MAP:  [91 mmHg] 91 mmHg  Arterial Line BP: (109-125)/(67-75) 109/67  SpO2:  [95 %-98 %] 98 %    Intake/Output Summary (Last 24 hours) at 1/27/2025 2022  Last data filed at 1/27/2025 1956  Gross per 24 hour   Intake 1440 ml   Output 100 ml   Net 1340 ml     Wt Readings from Last 4 Encounters:   01/27/25 76.2 kg (167 lb 15.9 oz)   01/21/25 75.8 kg (167 lb)   01/03/25 78.9 kg (174 lb)   01/02/25 78.9 kg (173 lb 14.4 oz)     Arterial Line BP: (109-125)/(67-75) 109/67  MAP:  [91 mmHg] 91 mmHg  Resp: (!) 113  Recent Labs   Lab 01/27/25  1853   PH 7.42   PCO2 36   PO2 106*   HCO3 23   O2PER 50       GEN: no acute distress   HEENT: head ncat, sclera anicteric, OP patent, trachea midline   PULM: unlabored synchronous with vent, clear anteriorly    CV/COR: RRR S1S2 no gallop,  No rub, no murmur  ABD: soft nontender, hypoactive bowel sounds, no mass  EXT:  warm and well perfused x4  NEURO: PERRL, no obvious deficits  SKIN: no obvious rash  LINES: clean, dry intact         Data:   All data and imaging reviewed     ROUTINE ICU LABS (Last four results)  CMP  Recent Labs   Lab 01/27/25  1852 01/27/25  1618 01/27/25  1330 01/27/25  1244 01/24/25  1659 01/24/25  1612 01/24/25  0838 01/24/25  0425 01/22/25  0952 01/22/25  0733 01/21/25  2209 01/21/25  1606    133*  --   --   --   --   --  133*  --  131*  --  130*   POTASSIUM 4.3 4.5  --   --   --   --   --  4.5  --  4.2  --  4.3   CHLORIDE 97* 95*  --   --   --   --   --  93*  --  93*  --  91*   CO2 21* 24  --   --   --   --   --  26  --  25  --  25   ANIONGAP 17* 14  --   --   --   --   --  14  --  13  --  14   GLC 81 92 119* 83   < >  --    < > 257*   < > 129*   < > 170*   BUN 48.5* 48.7*  --   --   --   --   --  52.6*  --  54.0*  --  52.4*   CR 7.24* 7.43*  --   --   --   --   --  7.52*  --  7.65*  --  7.03*   GFRESTIMATED 8*  "8*  --   --   --   --   --  7*  --  7*  --  8*   TERENCE 9.4 9.9  --   --   --   --   --  9.3  --  9.8  --  10.4   MAG 1.6*  --   --   --   --   --   --   --   --   --   --   --    PHOS 6.1*  --   --   --   --   --   --   --   --   --   --   --    PROTTOTAL 5.3*  --   --   --   --  5.7*  --   --   --   --   --  6.9   ALBUMIN 2.3*  --   --   --   --   --   --   --   --   --   --  2.7*   BILITOTAL 0.3  --   --   --   --   --   --   --   --   --   --  0.2   ALKPHOS 101  --   --   --   --   --   --   --   --   --   --  246*   AST 12  --   --   --   --   --   --   --   --   --   --  17   ALT 8  --   --   --   --   --   --   --   --   --   --  14    < > = values in this interval not displayed.     CBC  Recent Labs   Lab 01/27/25  1852 01/25/25  0735 01/24/25  0425 01/22/25  1451   WBC 14.4* 15.2* 12.6* 13.6*   RBC 2.75* 3.04* 3.36* 3.94*   HGB 8.6* 9.8* 10.4* 12.2*   HCT 26.1* 29.3* 32.0* 38.0*   MCV 95 96 95 96   MCH 31.3 32.2 31.0 31.0   MCHC 33.0 33.4 32.5 32.1   RDW 17.8* 17.8* 17.8* 18.0*    405 409 457*     INR  Recent Labs   Lab 01/23/25  1517 01/23/25  0449 01/22/25  2157 01/22/25  0733   INR 1.46* 1.65* 1.78* 2.70*     Arterial Blood Gas  Recent Labs   Lab 01/27/25  1853   PH 7.42   PCO2 36   PO2 106*   HCO3 23   O2PER 50       All cultures:  No results for input(s): \"CULT\" in the last 168 hours.  Recent Results (from the past 24 hours)   XR Chest Port 1 View    Narrative    EXAM: XR CHEST PORT 1 VIEW  LOCATION: [facility]  DATE: 1/27/2025    INDICATION: preop  COMPARISON: 1/21/2025      Impression    IMPRESSION: Moderate-large right pleural effusion, increased compared to prior. Clear left lung and pleural space. Stable enlarged cardiomediastinal silhouette. No pneumothorax.         Billing: This patient is critically ill: {YES / NO:319628::\"Yes\"}. Total critical care time today *** min, excluding procedures.       Past Medical/surgical hx, meds, allergies, social history, family " history     Past Medical History:   Diagnosis Date    CAD (coronary artery disease)     Chronic systolic heart failure (H)     ESRD (end stage renal disease) on dialysis (H)     Gastroesophageal reflux disease without esophagitis     Gout     HLD (hyperlipidemia)     TALI (obstructive sleep apnea)     PAD (peripheral artery disease)     S/p RLE stenting of SFA/popliteal arteries    Type 2 diabetes mellitus with diabetic neuropathy (H)      Past Surgical History:   Procedure Laterality Date    AMPUTATE TOE(S) Left 10/27/2024    Procedure: AMPUTATION, TOE left great toe;  Surgeon: Haley Joseph DPM, Podiatry/Foot and Ankle Surgery;  Location: [facility] OR    BYPASS GRAFT FEMOROTIBIAL Left 10/25/2024    Procedure: LEFT FEMORAL ENDARTERECTOMY, LEFT FEMORAL TO TIBIAL PERONEAL TRUNK BYPASS WITH LEFT GREAT SEPHANEOUS VEIN, WOUND VAC PLACEMENT ON LEFT GROIN.;  Surgeon: Raul Gray MD;  Location: [facility] OR    BYPASS GRAFT FEMOROTIBIAL Left 10/27/2024    Procedure: CREATION, BYPASS, VASCULAR, FEMORAL TO TIBIAL REVISION OF BYPASS LEFT COMMON FEMORAL TO TIBIAL.;  Surgeon: Raul Gray MD;  Location: [facility] OR    CV CORONARY ANGIOGRAM N/A 11/27/2024    Procedure: Coronary Angiogram;  Surgeon: Clem Matt MD;  Location:  [facility]    CV LOWER EXTREMITY ANGIOGRAM LEFT Left 10/27/2024    Procedure: Angiogram Lower Extremity Left;  Surgeon: Raul Gray MD;  Location:  OR    CV PCI N/A 11/27/2024    Procedure: Percutaneous Coronary Intervention;  Surgeon: Clem Matt MD;  Location:  [facility]    IR LOWER EXTREMITY ANGIOGRAM LEFT  10/17/2024    IR LOWER EXTREMITY ANGIOGRAM RIGHT  1/23/2025     Current Facility-Administered Medications   Medication Dose Route Frequency Provider Last Rate Last Admin    [Auto Hold] acetaminophen (TYLENOL) tablet 650 mg  650 mg Oral Q4H PRN Lizett Davis MD   650 mg at 01/22/25 2118    Or    [Auto Hold] acetaminophen (TYLENOL)  Suppository 650 mg  650 mg Rectal Q4H PRN Lizett Davis MD        [Auto Hold] allopurinol (ZYLOPRIM) tablet 200 mg  200 mg Oral QPM Lizett Davis MD   200 mg at 01/26/25 1957    [Auto Hold] atorvastatin (LIPITOR) tablet 40 mg  40 mg Oral At Bedtime Lizett Davis MD   40 mg at 01/26/25 2207    [Auto Hold] calcitRIOL (ROCALTROL) capsule 0.25 mcg  0.25 mcg Oral At Bedtime Lizett Davis MD   0.25 mcg at 01/26/25 2207    [Auto Hold] calcium carbonate (TUMS) chewable tablet 1,000 mg  1,000 mg Oral 4x Daily PRN Lizett Davis MD        [Auto Hold] cinacalcet (SENSIPAR) tablet 60 mg  60 mg Oral Q Mon Wed Fri AM Lizett Davis MD   60 mg at 01/24/25 2151    [Auto Hold] clopidogrel (PLAVIX) tablet 75 mg  75 mg Oral QPM Lizett Davis MD   75 mg at 01/26/25 1957    [Auto Hold] Continuing statin from home medication list OR statin order already placed during this visit   Does not apply DOES NOT GO TO Lizett Sykes MD        dextrose 5% and 0.9% NaCl infusion   Intravenous Continuous RauniyarAlejandro MD 75 mL/hr at 01/27/25 1331 New Bag at 01/27/25 1331    [Auto Hold] glucose gel 15-30 g  15-30 g Oral Q15 Min PRLizett Paredes MD   15 g at 01/27/25 1221    Or    [Auto Hold] dextrose 50 % injection 25-50 mL  25-50 mL Intravenous Q15 Min PRLizett Paredes MD        Or    [Auto Hold] glucagon injection 1 mg  1 mg Subcutaneous Q15 Min PRLizett Paredes MD        [Auto Hold] dianeal PD LOW calcium-1.5% dex (calcium 2.5 mEq/L) 6,000 mL PERITONEAL DIALYSATE   Dialysis [facility] Supplied PD Jennifer Jameson MD        [Auto Hold] dianeal PD LOW calcium-2.5% dex (calcium 2.5 mEq/L) 6,000 mL PERITONEAL DIALYSATE   Dialysis [facility] Supplied PD Jennifer Jameson MD        [Auto Hold] gentamicin (GARAMYCIN) 0.1 % cream   Topical Daily PRN Jennifer Jameson MD        heparin 25,000 units in 0.45% NaCl 250 mL ANTICOAGULANT infusion  0-5,000 Units/hr Intravenous Continuous Lizett Davis MD   Stopped at 01/27/25 1410    [Auto Hold]  HYDROmorphone (DILAUDID) injection 0.2 mg  0.2 mg Intravenous Q2H PRN Lizett Davis MD        [Auto Hold] HYDROmorphone (DILAUDID) injection 0.4 mg  0.4 mg Intravenous Q2H PRN Lizett Davis MD        [Auto Hold] insulin aspart (NovoLOG) injection (RAPID ACTING)  1-7 Units Subcutaneous TID AC Lizett Davis MD   1 Units at 01/26/25 1256    [Auto Hold] insulin aspart (NovoLOG) injection (RAPID ACTING)  1-5 Units Subcutaneous At Bedtime Lizett Davis MD   2 Units at 01/25/25 2221    [Auto Hold] insulin glargine (LANTUS PEN) injection 15 Units  15 Units Subcutaneous At Bedtime Lizett Davis MD   15 Units at 01/26/25 2208    iopamidol (ISOVUE-300) IV solution 61%    PRN Patrick Heni MD   26 mL at 01/27/25 1857    [Auto Hold] lidocaine (LMX4) cream   Topical Q1H PRN Lizett Davis MD        lidocaine 1 % 0.1-1 mL  0.1-1 mL Other Q1H PRN Jimbo Devi MD        [Auto Hold] lidocaine 1 % 0.1-1 mL  0.1-1 mL Other Q1H PRN Lizett Davis MD        [Auto Hold] metoprolol (LOPRESSOR) injection 2.5 mg  2.5 mg Intravenous Q4H PRN Lizett Davis MD        [Auto Hold] metoprolol succinate ER (TOPROL-XL) 24 hr half-tab 25 mg  25 mg Oral Daily Lizett Davis MD   25 mg at 01/27/25 0848    [Auto Hold] midodrine (PROAMATINE) tablet 2.5 mg  2.5 mg Oral Once PRN Lizett Davis MD        [Auto Hold] multivitamin RENAL (TRIPHROCAPS) capsule 1 capsule  1 capsule Oral Daily Lizett Davis MD   1 capsule at 01/27/25 0848    [Auto Hold] naloxone (NARCAN) injection 0.2 mg  0.2 mg Intravenous Q2 Min PRN Lizett Davis MD        Or    [Auto Hold] naloxone (NARCAN) injection 0.4 mg  0.4 mg Intravenous Q2 Min PRN Lizett Davis MD        Or    [Auto Hold] naloxone (NARCAN) injection 0.2 mg  0.2 mg Intramuscular Q2 Min PRN Lizett Davis MD        Or    [Auto Hold] naloxone (NARCAN) injection 0.4 mg  0.4 mg Intramuscular Q2 Min PRN Lizett Davis MD        [Auto Hold] ondansetron (ZOFRAN ODT) ODT tab 4 mg  4 mg Oral Q6H PRN Ryan  Lizett Wilkerson MD   4 mg at 01/24/25 0903    Or    [Auto Hold] ondansetron (ZOFRAN) injection 4 mg  4 mg Intravenous Q6H PRN Lizett Davis MD        [Auto Hold] oxyCODONE (ROXICODONE) tablet 5 mg  5 mg Oral Q4H PRN Lizett Davis MD   5 mg at 01/26/25 1229    [Auto Hold] oxyCODONE IR (ROXICODONE) half-tab 2.5 mg  2.5 mg Oral Q4H PRN Lizett Davis MD        [Auto Hold] pantoprazole (PROTONIX) EC tablet 40 mg  40 mg Oral QAM AC Lizett Davis MD   40 mg at 01/27/25 0848    Patient is already receiving anticoagulation with heparin, enoxaparin (LOVENOX), warfarin (COUMADIN)  or other anticoagulant medication   Does not apply Continuous PRN Lizett Davis MD        [Auto Hold] piperacillin-tazobactam (ZOSYN) 2.25 g vial to attach to  ml bag  2.25 g Intravenous Q8H Lizett Davis  mL/hr at 01/26/25 1755 2.25 g at 01/27/25 1750    [Auto Hold] senna-docusate (SENOKOT-S/PERICOLACE) 8.6-50 MG per tablet 1 tablet  1 tablet Oral BID PRN Lizett Davis MD        Or    [Auto Hold] senna-docusate (SENOKOT-S/PERICOLACE) 8.6-50 MG per tablet 2 tablet  2 tablet Oral BID PRN Lizett Davis MD        [Auto Hold] sevelamer carbonate (RENVELA) tablet 800 mg  800 mg Oral TID w/meals Lizett Davis MD   800 mg at 01/26/25 1756    sodium chloride (PF) 0.9% PF flush 3 mL  3 mL Intracatheter q1 min prJimbo Franklin MD        [Auto Hold] sodium chloride (PF) 0.9% PF flush 3 mL  3 mL Intracatheter Q8H Lizett Davis MD   3 mL at 01/26/25 2122    [Auto Hold] sodium chloride (PF) 0.9% PF flush 3 mL  3 mL Intracatheter q1 min prn Lizett Davis MD        sodium chloride 0.9 % infusion   Intravenous Continuous Jimbo Devi MD 10 mL/hr at 01/27/25 1945 Rate Change at 01/27/25 1945     Facility-Administered Medications Ordered in Other Encounters   Medication Dose Route Frequency Provider Last Rate Last Admin    albumin human 5 % injection   Intravenous Continuous PRN Dominique Nath APRN CRNA   Stopped at 01/27/25 1929     EPINEPHrine drip 0.02 mg/mL (5 mg/250 mL)   Intravenous Continuous PRN Dominique Nath APRN CRNA 9.144 mL/hr at 01/27/25 1915 0.04 mcg/kg/min at 01/27/25 1915    fentaNYL (PF) (SUBLIMAZE) injection   Intravenous PRN Dominique Nath APRN CRNA   100 mcg at 01/27/25 1714    heparin   Intravenous PRN Dominique Nath APRN CRNA   3,000 Units at 01/27/25 1936    lidocaine 2% injection (MDV)   Intravenous PRN Dominique Nath APRN CRNA   100 mg at 01/27/25 1714    midazolam (VERSED) injection   Intravenous PRN Dominique Nath APRN CRNA   2 mg at 01/27/25 1855    norepinephrine  bolus 6.4 mcg/mL   Intravenous Continuous PRN Dominique Nath APRN CRNA   6.4 mcg at 01/27/25 1914    norepinephrine (LEVOPHED) 4 mg in  mL infusion PREMIX   Intravenous Continuous PRN Dominique Nath APRN CRNA   Stopped at 01/27/25 1831    phenylephrine (ANETA-SYNEPHRINE) injection   Intravenous Continuous PRN Dominique Nath APRN CRNA   400 mcg at 01/27/25 1716    propofol (DIPRIVAN) injection 10 mg/mL vial   Intravenous PRN Dominique Nath APRN CRNA   50 mg at 01/27/25 1714    rocuronium injection   Intravenous PRN Dominique Nath APRN CRNA   10 mg at 01/27/25 1928    sodium chloride 0.9 % infusion   Intravenous Continuous PRN Dominique Nath APRN CRNA   New Bag at 01/27/25 1930    vasopressin (VASOSTRICT) 20 Units in sodium chloride 0.9 % 100 mL standard conc infusion   Intravenous Continuous PRN Dominique Nath APRN CRNA 12 mL/hr at 01/27/25 1831 2.4 Units/hr at 01/27/25 1831    vasopressin 1 unit/mL syringe   Intravenous PRN Dominique Nath APRN CRNA   1 Units at 01/27/25 1750      No Known Allergies  Social History     Socioeconomic History    Marital status:      Spouse name: Not on file    Number of children: Not on file    Years of education: Not on file    Highest education level: Not on file   Occupational History    Not on file   Tobacco Use    Smoking status: Former     Types: Cigarettes    Smokeless  tobacco: Never   Vaping Use    Vaping status: Never Used   Substance and Sexual Activity    Alcohol use: Not Currently     Comment: quit 20 years    Drug use: Never    Sexual activity: Not on file   Other Topics Concern    Not on file   Social History Narrative    Not on file     Social Drivers of Health     Financial Resource Strain: Low Risk  (1/21/2025)    Financial Resource Strain     Within the past 12 months, have you or your family members you live with been unable to get utilities (heat, electricity) when it was really needed?: No   Food Insecurity: Low Risk  (1/21/2025)    Food Insecurity     Within the past 12 months, did you worry that your food would run out before you got money to buy more?: No     Within the past 12 months, did the food you bought just not last and you didn t have money to get more?: No   Transportation Needs: High Risk (1/21/2025)    Transportation Needs     Within the past 12 months, has lack of transportation kept you from medical appointments, getting your medicines, non-medical meetings or appointments, work, or from getting things that you need?: Yes   Physical Activity: Unknown (10/8/2024)    Exercise Vital Sign     Days of Exercise per Week: 0 days     Minutes of Exercise per Session: Not on file   Stress: No Stress Concern Present (10/8/2024)    [employer] - Occupational Stress Questionnaire     Feeling of Stress : Only a little   Social Connections: Unknown (10/8/2024)    Social Connection and Isolation Panel [NHANES]     Frequency of Communication with Friends and Family: Not on file     Frequency of Social Gatherings with Friends and Family: Patient declined     Attends Shinto Services: Not on file     Active Member of Clubs or Organizations: Not on file     Attends Club or Organization Meetings: Not on file     Marital Status: Not on file   Interpersonal Safety: Low Risk  (1/21/2025)    Interpersonal Safety     Do you feel physically and  emotionally safe where you currently live?: Yes     Within the past 12 months, have you been hit, slapped, kicked or otherwise physically hurt by someone?: No     Within the past 12 months, have you been humiliated or emotionally abused in other ways by your partner or ex-partner?: No   Housing Stability: Low Risk  (1/21/2025)    Housing Stability     Do you have housing? : Yes     Are you worried about losing your housing?: No     History reviewed. No pertinent family history.             Zander Glover, MS, MS4

## 2025-01-28 NOTE — PROGRESS NOTES
Brief, no charge note.    - he underwent Right popliteal/tibial interoperative angiogram along with right fem-pop endarterectomy and bypass on 1/27/25 by vascular surgery  - palliative care services plan to meet with family on 1/29/25  - postoperatively patient remains intubated on mechanical ventilator and on pressors with intensivist managing    Hospitalist service will sign off at this time. Please contact us for transfer of care once patient stable off mechanical ventilator and pressors.

## 2025-01-28 NOTE — PROGRESS NOTES
[facility] ICU RESPIRATORY NOTE        Date of Admission: 1/21/2025    Date of Intubation (most recent): 01/27/2025    Reason for Mechanical Ventilation: 2    Number of Days on Mechanical Ventilation: Airway protection.    Met Criteria for Spontaneous Breathing Trial: No    Reason for No Spontaneous Breathing Trial: per MD.    Bite Block: No    Significant Events Today: None.    ABG Results:   Recent Labs   Lab 01/27/25 2229 01/27/25 2037 01/27/25  1853   PH 7.35 7.40 7.42   PCO2 39 35 36   PO2 190* 132* 106*   HCO3 21 22 23   O2PER 60  --  50         Current Vent Settings: FiO2 (%): 60 %, Resp: 14, Vent Mode: CMV/AC, Resp Rate (Set): 14 breaths/min, Tidal Volume (Set, mL): 450 mL, PEEP (cm H2O): 6 cmH2O, Resp Rate (Set): 14 breaths/min, Tidal Volume (Set, mL): 450 mL, PEEP (cm H2O): 6 cmH2O    Skin Assessment: clean and intact.    Plan: PS today and extubate.    Holly Brown, RT on 1/28/2025 at 4:10 AM

## 2025-01-28 NOTE — PROGRESS NOTES
VSS. A/O. 1L O2, coarse lungs. Up-1/walker. Pt refused to let me take his socks off to check his foot. Hep gt stopped at 2 pm. NPO. Low sugar, gave some glucose and BS WDL. Abdo PD cath CDI. Coccyx mapilex. Makes urine times.

## 2025-01-28 NOTE — PROGRESS NOTES
Cycler set up per protocol and per treatment orders     Results from previous treatment:    Nothing to report.  Machine was shut off and bags were still in place and appeared unused.  I spoke with his nurse and she stated he did't run yesterday due to being in post op late.    Cycler Serial Number: [id number]  Total Treatment Volume: 11734 mL  Total treatment time: 9 hrs  Fill Volume: 2000 ml  Last Fill Volume:0 ml  Heater ba.5% 6000mL;  Lot #: [id number];  Expiration Date: 2026  Side Bag #1: 1.5% 6000mL;  Lot #: [id number];  Expiration Date: 2026    Number of cycles including final fill: 5    Last Fill Volume: 0 ml  Initial Drain Alarm: 0 ml  PD orders reviewed with Patient procedure and ESRD teaching done and questions answered.  Cycler ready for hook-up.    Report given to: 6TH FLOOR MARYSOL Daniel consent form signed YES

## 2025-01-28 NOTE — PROGRESS NOTES
Critical Care  Note      01/28/2025    Name: Arnulfo Pritchett MRN#: [id number]   Age: 64 year old YOB: 1960     Hsptl Day# 7  ICU DAY #    MV DAY #             Problem List:   Active Problems:    Cellulitis           Summary/Hospital Course:   Arnulfo Pritchett is a 64 year old male with personal medical history of severe heart failure (EF 30%), ESRD (on peritoneal dialysis), chronic paroxysmal AF on warfarin, LLE PAD s/p endarterectomy, RCC s/p nephrectomy admitted on 1/21/2025 for septic shock secondary to right leg PAD with worsening right heel necrotic ulcer. The patient was seen in ED at outside hospital on 1/3/25 and diagnosed with pneumonia with completed course of Cefdinir. He then presented on 1/21/25 to outside hospital for right leg pain and found to have right leg ulcer and transferred to [facility] for vascular surgery evaluation. Now s/p right common femoral BK popliteal/tibial endarterectomy and right common femoral to below-knee popliteal/tibial PTFE bypass performed on 1/27/25. The patient was admitted to the ICU for requirement of mechanical ventilation and pressor support following surgery for multifactorial shock.    Interval/overnight events:  - No acute overnight events  - Remains intubated on vasopressin and Epi, not able to wean pressors overnight  - RASS score at -2 below goal of 0 to -1 on propofol   - CXR Right pleural effusion with associated passive atelectasis in adj right base      Assessment and plan :     Arnulfo Pritchett IS a 64 year old male admitted on 1/21/2025 for septic shock secondary to right leg PAD with worsening right heel necrotic ulcer now s/p right popliteal endarterectomy with PTFE bypass on 1/27/25 requiring mechanical ventilation and pressure support following surgery.     I have personally reviewed the daily labs, imaging studies, cultures and discussed the case with referring physician and consulting physicians.     My assessment and plan by system for this  patient is as follows:    Neurology/Psychiatry:   # Pain/analgesia  # Sedation  # Delirium  Palliative following with goals of care conference on 1/29  Sedation on propofol  Titrate propofol to RASS goals of -1 to 0  Wean propofol as able  Pain control with Tylenol prn, Dilaudid prn    MSK:  # Hx PAD of LLE s/p endarterectomy and fem-tibioperoneal trunk bypass on 10/25/24  # Hx RLE arterial occlusion s/p SFA angioplasty and stent 5/2024  # PAD, right heel gangrene, occluded SFA, no evidence of osteomyelitis  # s/p right common femoral and below-knee popliteal/tibial endarterectomy  # s/p right femoral to below knee popliteal/tibial PTFE Bypass  Vascular surgery following  Wound care following  Podiatry following    Cardiovascular:   # Chronic paroxysmal atrial fibrillation on warfarin PTA  # Ischemic cardiomyopathy - Severe EFrEF (30%) and mild RV dysfunction - last Echo 1/25/25, hx of HLD, and Hx CAD s/p multiple stents  # Multifactorial shock, most likely vasoplegic secondary to stress of surgery and sedation vs less likely hypovolemic as patient has minimal urine output at baseline with known ESRD, lactate 1.3 - improving  Continue to manage shock on Epi and Vasopressin  Wean as able with MAP goal > 65  Continue heparin  units/hr  Continue Plavix  Hold warfarin, reversed with vit K by vascular surgery on 1/22/25    Pulmonary/Ventilator Management:   # On Mechanical Ventilation for acute hypoxic respiratory failure - improving, intubated in OR during procedure on 1/27  # Right pleural effusion with passive atelectasis - stable per CXR 1/27/25 when compared to CXR 1/21/25  # s/p thoracentesis 1/24/25 with 1.5 L clear yellow fluid removed, likely transudative based on low cell count of 117, , and protein of 1.7. Possibly secondary to reduced peritoneal dialysis during hospitalization vs underlying severe HFrEF (30%)  # Mixed acidosis, pH 7.28 on most recent ABG  Repeat ABG this evening  Continue to  monitor for need of thoracentesis  Wean vent settings as tolerated   Pressure support trial performed, low pH on ABG  Plan to extubate if repeat ABG improved  Repeat CXR 1/29/25 AM    GI and Nutrition :   # Moderate protein-calorie malnutrition  NG tube in place  NPO for now, initiate tube feeds if unable to extubate tomorrow  Nutrition consulted, may start clear liquids if extubated    Renal/Fluids/Electrolytes:   # ESRD on peritoneal dialysis, receiving PD tonight   # Hx of RCC s/p R nephrectomy  # Non-anion gap metabolic acidosis - based on ABG with pH of 7.28, bicarb of 21, pCO2 44, and pO2 of 114  Daily BMP  Nephrology following  Continue daily PD  Continue Sensipar and sevelamer PTA    Infectious Disease:   # R heel necrotic ulcer and gangrenous leg. S/p bypass of right femoral to popliteal artery 1/27/25.   # MRSA positive nasal swab on 1/21/25, blood culture no growth to date  Continue Zosyn 2.2g IV q6h for 10 day course for foot wound - on day 5/10- complete 2/1/25    Endocrine:   # Hx of secondary hyperparathyroidism and hyperphosphatemia, phosphorous elevated to 8.0  # Hx type 2 DM, last A1c of 5.7 on 10/2024  Medium dose insulin sliding scale  Holding Lantus at this time    Hematology/Oncology:   # Anemia of chronic disease, Hgb of 9.2, stable over last few days, no signs or symptoms of active bleeding, downtrend from 12.2 on 1/22/25  # Leukocytosis - WBC 16.7, mild upward trend, likely secondary to known necrotic ulcer of R heel  Daily CBC  Given recent procedure, transfuse blood at Hgb < 8     IV/Access:   1. Venous access - CVC R internal jugular, 1x PIV  2. Arterial access - Right radial line  3. ETT 8 mm  4. Peritoneal dialysis catheter    ICU Prophylaxis:   1. DVT: IV Heparin  2. VAP: HOB 30 degrees, chlorhexidine rinse  3. Stress Ulcer: PPI/H2 blocker  4. Restraints: Nonviolent soft two point restraints required and necessary for patient safety and continued cares and good effect as patient  continues to pull at necessary lines, tubes despite education and distraction. Will readdress daily.   5. Wound care  - WOC following  6. Feeding - NPO, nutrition consulted  7. Family Update: spoke to son Ervin over phone  8. Disposition - ICU, care conference planned 1/29    Clinically Significant Risk Factors   { TIP  Diagnoses will continue to appear throughout the admission.  :[id number]}      # Hyponatremia: Lowest Na = 133 mmol/L in last 2 days, will monitor as appropriate  # Hypochloremia: Lowest Cl = 95 mmol/L in last 2 days, will monitor as appropriate   # Hypercalcemia: corrected calcium is >10.1, will monitor as appropriate  # Hypomagnesemia: Lowest Mg = 1.6 mg/dL in last 2 days, will replace as needed   # Hypoalbuminemia: Lowest albumin = 2.3 g/dL at 1/27/2025  6:52 PM, will monitor as appropriate  # Coagulation Defect: INR = 1.77 (Ref range: 0.85 - 1.15) and/or PTT = 42 Seconds (Ref range: 22 - 38 Seconds), will monitor for bleeding    # Hypertension: Noted on problem list                # Financial/Environmental Concerns:                               Key Medications:     Current Facility-Administered Medications   Medication Dose Route Frequency Provider Last Rate Last Admin    - MEDICATION INSTRUCTIONS for Dialysis Patients -   Does not apply See Admin Instructions Melissa Macias MD        acetaminophen (TYLENOL) solution 975 mg  975 mg Oral or NG Tube Q8H Milo Carrion MD   975 mg at 01/28/25 0625    [Held by provider] allopurinol (ZYLOPRIM) tablet 200 mg  200 mg Oral QPM Milo Carrion MD   200 mg at 01/26/25 1957    atorvastatin (LIPITOR) tablet 40 mg  40 mg Oral At Bedtime Milo Carrion MD   40 mg at 01/26/25 2207    calcitRIOL (ROCALTROL) capsule 0.25 mcg  0.25 mcg Oral At Bedtime Milo Carrion MD   0.25 mcg at 01/26/25 2207    chlorhexidine (PERIDEX) 0.12 % solution 15 mL  15 mL Mouth/Throat Q12H Patricia Dykes APRN CNP   15 mL at 01/27/25 2251    cinacalcet (SENSIPAR)  tablet 60 mg  60 mg Oral Q Mon Wed Fri AM Milo Carrion MD   60 mg at 01/24/25 2151    clopidogrel (PLAVIX) tablet 75 mg  75 mg Oral QPM Patricia Dykes APRN CNP   75 mg at 01/26/25 1957    insulin aspart (NovoLOG) injection (RAPID ACTING)  1-7 Units Subcutaneous Q4H Patricia Dykes APRN CNP   2 Units at 01/28/25 0430    [Held by provider] insulin glargine (LANTUS PEN) injection 15 Units  15 Units Subcutaneous At Bedtime Milo Carrion MD   15 Units at 01/26/25 2208    [Held by provider] methocarbamol (ROBAXIN) tablet 750 mg  750 mg Oral or NG Tube 4x Daily Milo Carrion MD        [Held by provider] metoprolol succinate ER (TOPROL XL) 24 hr tablet 25 mg  25 mg Oral Daily Milo Carrion MD   25 mg at 01/27/25 0848    multivitamin RENAL (TRIPHROCAPS) capsule 1 capsule  1 capsule Oral Daily Miol Carrion MD   1 capsule at 01/27/25 0848    pantoprazole (PROTONIX) 2 mg/mL suspension 40 mg  40 mg Per Feeding Tube QAM AC Patricia Dykes APRN CNP        Or    pantoprazole (PROTONIX) IV push injection 40 mg  40 [facility] APR[facility] CNP        piperacillin-tazobactam (ZOSYN) 2.25 g vial to attach to  ml bag  2.25 g Intravenous Q8H Patricia Dykes APRN  mL/hr at 01/26/25 1755 2.25 g at 01/28/25 0208    polyethylene glycol (MIRALAX) Packet 17 g  17 g Oral or NG Tube Daily Milo Carrion MD        senna-docusate (SENOKOT-S/PERICOLACE) 8.6-50 MG per tablet 1 tablet  1 tablet Oral or NG Tube BID Milo Carrion MD   1 tablet at 01/27/25 2258    sevelamer carbonate (RENVELA) tablet 800 mg  800 mg Oral TID w/meals Milo Carrion MD   800 mg at 01/26/25 1756    sodium chloride (PF) 0.9% PF flush 3 mL  3 mL Intracatheter Q8H Milo Carrion MD   3 mL at 01/28/25 0628    sodium chloride (PF) 0.9% PF flush 3 mL  3 mL Intracatheter Q8H Milo Carrion MD   3 mL at 01/28/25 0538     Current Facility-Administered Medications   Medication Dose Route Frequency Provider Last Rate  Last Admin    Continuing statin from home medication list OR statin order already placed during this visit   Does not apply DOES NOT GO TO Milo Hernandez MD        [Held by provider] dextrose 5% and 0.9% NaCl infusion   Intravenous Continuous Milo Carrion MD   Stopped at 01/27/25 2318    dianeal PD LOW calcium-1.5% dex (calcium 2.5 mEq/L) 6,000 mL PERITONEAL DIALYSATE   Dialysis [facility] Supplied PD Milo Carrion MD        dianeal PD LOW calcium-2.5% dex (calcium 2.5 mEq/L) 6,000 mL PERITONEAL DIALYSATE   Dialysis [facility] Supplied PD Milo Carrion MD        EPINEPHrine (ADRENALIN) 5 mg in  mL infusion  0.01-0.3 mcg/kg/min Intravenous Continuous Patricia Dykes APRN CNP 6.9 mL/hr at 01/28/25 0400 0.03 mcg/kg/min at 01/28/25 0400    heparin infusion 25,000 units in 0.45% NaCl 250 mL ANTICOAGULANT  500 Units/hr Intravenous Continuous Milo Carrion MD 5 mL/hr at 01/28/25 0400 500 Units/hr at 01/28/25 0400    propofol (DIPRIVAN) infusion  5-75 mcg/kg/min Intravenous Continuous Patricia Dykes APRN CNP 11.4 mL/hr at 01/28/25 0545 25 mcg/kg/min at 01/28/25 0545    And    Medication Instruction   Does not apply Continuous PRN Patricia Dykes APRN CNP        norepinephrine (LEVOPHED) 4 mg in  mL infusion PREMIX  0.01-0.6 mcg/kg/min Intravenous Continuous Patricia Dykes APRN CNP        Patient is already receiving anticoagulation with heparin, enoxaparin (LOVENOX), warfarin (COUMADIN)  or other anticoagulant medication   Does not apply Continuous PRN Milo Carrion MD        vasopressin 0.2 units/mL in NS (PITRESSIN) standard conc infusion  2.4 Units/hr Intravenous Continuous Patricia Dykes APRN CNP 12 mL/hr at 01/28/25 0400 2.4 Units/hr at 01/28/25 0400              Physical Examination:   Temp:  [97.6  F (36.4  C)-99.1  F (37.3  C)] 98.4  F (36.9  C)  Pulse:  [] 87  Resp:  [] 14  BP: (103-139)/(72-98) 103/75  MAP:  [69 mmHg-91 mmHg] 75 mmHg  Arterial Line BP: ()/(53-75)  99/60  FiO2 (%):  [60 %] 60 %  SpO2:  [95 %-100 %] 100 %    Intake/Output Summary (Last 24 hours) at 1/28/2025 0755  Last data filed at 1/28/2025 0400  Gross per 24 hour   Intake 2375.25 ml   Output 200 ml   Net 2175.25 ml     Wt Readings from Last 4 Encounters:   01/27/25 76.2 kg (167 lb 15.9 oz)   01/21/25 75.8 kg (167 lb)   01/03/25 78.9 kg (174 lb)   01/02/25 78.9 kg (173 lb 14.4 oz)     Arterial Line BP: ()/(53-75) 99/60  MAP:  [69 mmHg-91 mmHg] 75 mmHg  BP - Mean:  [84-93] 84  FiO2 (%): 60 %, Resp: 14, Vent Mode: CMV/AC, Resp Rate (Set): 14 breaths/min, Tidal Volume (Set, mL): 450 mL, PEEP (cm H2O): 6 cmH2O, Resp Rate (Set): 14 breaths/min, Tidal Volume (Set, mL): 450 mL, PEEP (cm H2O): 6 cmH2O  Recent Labs   Lab 01/27/25  2229 01/27/25 2037 01/27/25  1853   PH 7.35 7.40 7.42   PCO2 39 35 36   PO2 190* 132* 106*   HCO3 21 22 23   O2PER 60  --  50       GEN: no acute distress, appears euvolemic  HEENT: head ncat, sclera anicteric, OP patent, trachea midline   PULM: unlabored synchronous with vent, clear anteriorly  CV/COR: RRR S1S2 no gallop,  No rub, no murmur  ABD: soft nontender, normoactive bowel sounds, no mass  EXT:  warm and well perfused x4, no edema, right heel ulcer, blue discoloration of 2nd toe of right foot  NEURO: PERRL, no obvious deficits, opens eyes on command, no hand squeeze or toe movements when asked  SKIN: no obvious rash  LINES: clean, dry intact         Data:   All data and imaging reviewed     ROUTINE ICU LABS (Last four results)  CMP  Recent Labs   Lab 01/28/25  0429 01/28/25  0425 01/28/25  0102 01/27/25  2241 01/27/25 2230 01/27/25  2034 01/27/25  1852 01/27/25  1618 01/24/25  1659 01/24/25  1612 01/21/25  2209 01/21/25  1606   NA  --  134*  --   --  135  --  135 133*  --   --    < > 130*   POTASSIUM  --  5.2  --   --  4.8  --  4.3 4.5  --   --    < > 4.3   CHLORIDE  --  98  --   --  98  --  97* 95*  --   --    < > 91*   CO2  --  21*  --   --  20*  --  21* 24  --   --    < >  "25   ANIONGAP  --  15  --   --  17*  --  17* 14  --   --    < > 14   * 208* 174* 142* 155*   < > 81 92   < >  --    < > 170*   BUN  --  51.3*  --   --  48.6*  --  48.5* 48.7*  --   --    < > 52.4*   CR  --  7.65*  --   --  7.38*  --  7.24* 7.43*  --   --    < > 7.03*   GFRESTIMATED  --  7*  --   --  8*  --  8* 8*  --   --    < > 8*   TERENCE  --  9.6  --   --  9.5  --  9.4 9.9  --   --    < > 10.4   MAG  --  1.7  --   --  1.6*  --  1.6*  --   --   --   --   --    PHOS  --  8.0*  --   --  6.5*  --  6.1*  --   --   --   --   --    PROTTOTAL  --   --   --   --   --   --  5.3*  --   --  5.7*  --  6.9   ALBUMIN  --   --   --   --   --   --  2.3*  --   --   --   --  2.7*   BILITOTAL  --   --   --   --   --   --  0.3  --   --   --   --  0.2   ALKPHOS  --   --   --   --   --   --  101  --   --   --   --  246*   AST  --   --   --   --   --   --  12  --   --   --   --  17   ALT  --   --   --   --   --   --  8  --   --   --   --  14    < > = values in this interval not displayed.     CBC  Recent Labs   Lab 01/28/25  0425 01/27/25  2226 01/27/25  1852 01/25/25  0705   WBC 16.7* 15.3* 14.4* 15.2*   RBC 2.99* 2.97* 2.75* 3.04*   HGB 9.2* 9.1* 8.6* 9.8*   HCT 28.3* 28.0* 26.1* 29.3*   MCV 95 94 95 96   MCH 30.8 30.6 31.3 32.2   MCHC 32.5 32.5 33.0 33.4   RDW 18.3* 18.3* 17.8* 17.8*    314 330 405     INR  Recent Labs   Lab 01/27/25  2230 01/23/25  1517 01/23/25  0449 01/22/25  2157   INR 1.77* 1.46* 1.65* 1.78*     Arterial Blood Gas  Recent Labs   Lab 01/27/25  2229 01/27/25  2037 01/27/25  1853   PH 7.35 7.40 7.42   PCO2 39 35 36   PO2 190* 132* 106*   HCO3 21 22 23   O2PER 60  --  50       All cultures:  No results for input(s): \"CULT\" in the last 168 hours.  Recent Results (from the past 24 hours)   XR Chest Port 1 View    Narrative    EXAM: XR CHEST PORT 1 VIEW  LOCATION: [facility]  DATE: 1/27/2025    INDICATION: preop  COMPARISON: 1/21/2025      Impression    IMPRESSION: Moderate-large " right pleural effusion, increased compared to prior. Clear left lung and pleural space. Stable enlarged cardiomediastinal silhouette. No pneumothorax.   XR Surgery MOODY L/T 5 Min Fluoro w Stills    Narrative    This exam was marked as non-reportable because it will not be read by a   radiologist or a [place] non-radiologist provider.         XR Abdomen Port 1 View    Narrative    EXAM: XR ABDOMEN PORT 1 VIEW  LOCATION: [facility]  DATE: 1/28/2025    INDICATION: OG tube confirmation  COMPARISON: None.      Impression    IMPRESSION: Orogastric tube tip in the stomach. The sidehole is just below the GE junction.   XR Chest Port 1 View    Narrative    EXAM: XR CHEST PORT 1 VIEW  LOCATION: [facility]  DATE: 01/28/2025    INDICATION: Endotracheal tube placement.  COMPARISON: Portable chest single view 01/27/2025 at 1642 hours.      Impression    IMPRESSION: Interval placement of endotracheal tube, tip 4.8 cm above the suki. Right IJ catheter has been placed, tip in the SVC. Enteric tube has been placed, tip and side-port below the diaphragm. Moderately large right pleural effusion with   associated passive atelectasis in the adjacent right lung, unchanged. Calcified granuloma left apex. Minor strands of linear atelectasis left base. No effusion on the left. Normal cardiac size. Coronary artery stent. Mild venous congestion.   Atherosclerotic thoracic aorta. Degenerative changes thoracic spine. Previously healed bilateral rib fracture deformities. Monitoring leads overlying the chest.   XR Abdomen Port 1 View    Narrative    EXAM: XR ABDOMEN PORT 1 VIEW  LOCATION: [facility]  DATE: 1/28/2025    INDICATION: Tube placement.  COMPARISON: Portable abdomen single view 1/27/2025 at 2352 hours.      Impression    IMPRESSION: Enteric tube has been advanced, tip in the gastric antrum, satisfactory positioning. Postoperative changes in the abdomen.  Residual contrast material in the colon. Atherosclerotic changes. Right pleural effusion with associated passive   atelectasis in the adjacent right base.       Chio Mancera, MS4

## 2025-01-28 NOTE — ANESTHESIA POSTPROCEDURE EVALUATION
Patient: Arnulfo Pritchett    Procedure: Procedure(s):  RIGHT FEMORAL ARTERY TO RIGHT POPLITEAL ARTERY ENDARTERECTOMY,  COMMON FEMORAL TO POPLITEAL BELOW KNEE COMPOSITE GREATER SAPHENOUS/PTFE BYPASS GRAFT. GREATER SAPHENOUS VEIN HARVEST  INTRAOPERATIVE ANGIOGRAM       Anesthesia Type:  No value filed.    Note:  Disposition: Inpatient   Postop Pain Control: Uneventful            Sign Out: Well controlled pain   PONV: No   Neuro/Psych: Uneventful            Sign Out: Acceptable/Baseline neuro status   Airway/Respiratory: Uneventful            Sign Out: AIRWAY IN SITU/Resp. Support   CV/Hemodynamics: Uneventful            Sign Out: Acceptable CV status; No obvious hypovolemia; No obvious fluid overload   Other NRE:    DID A NON-ROUTINE EVENT OCCUR?            Last vitals:  Vitals:    01/27/25 0814 01/27/25 1543 01/27/25 1622   BP: 116/83 (!) 139/98 122/72   Pulse: (!) 109  103   Resp: 18 (!) 113    Temp: 36.9  C (98.4  F) 36.4  C (97.6  F) 37.1  C (98.7  F)   SpO2: 97% 95% 98%       Electronically Signed By: Cindy Forde  January 27, 2025  10:55 PM

## 2025-01-28 NOTE — PROGRESS NOTES
[facility]  PROGRESS NOTE    CHIEF COMPLAINT:      I was asked by Milo Carrion MD  to evaluate this patient for right foot.    PATIENT HISTORY:  Arnulfo Pritchett is a 64 year old male seen in follow up for his right foot. Now in the ICU following right lower extremity bypass and endarterectomy. Weaning sedation per nursing.         Review of Systems:  A 10 point review of systems was performed and is positive for that noted above in the patient history.  All other areas are negative.     PAST MEDICAL HISTORY:   Past Medical History:   Diagnosis Date    CAD (coronary artery disease)     Chronic systolic heart failure (H)     ESRD (end stage renal disease) on dialysis (H)     Gastroesophageal reflux disease without esophagitis     Gout     HLD (hyperlipidemia)     TALI (obstructive sleep apnea)     PAD (peripheral artery disease)     S/p RLE stenting of SFA/popliteal arteries    Type 2 diabetes mellitus with diabetic neuropathy (H)         PAST SURGICAL HISTORY:   Past Surgical History:   Procedure Laterality Date    AMPUTATE TOE(S) Left 10/27/2024    Procedure: AMPUTATION, TOE left great toe;  Surgeon: Haley Joseph DPM, Podiatry/Foot and Ankle Surgery;  Location: [facility] OR    BYPASS GRAFT FEMOROPOPLITEAL Right 1/27/2025    Procedure: RIGHT FEMORAL ARTERY TO RIGHT POPLITEAL ARTERY ENDARTERECTOMY,  COMMON FEMORAL TO POPLITEAL BELOW KNEE COMPOSITE GREATER SAPHENOUS/PTFE BYPASS GRAFT. GREATER SAPHENOUS VEIN HARVEST;  Surgeon: Patrick Hein MD;  Location: [facility] OR    BYPASS GRAFT FEMOROTIBIAL Left 10/25/2024    Procedure: LEFT FEMORAL ENDARTERECTOMY, LEFT FEMORAL TO TIBIAL PERONEAL TRUNK BYPASS WITH LEFT GREAT SEPHANEOUS VEIN, WOUND VAC PLACEMENT ON LEFT GROIN.;  Surgeon: Raul Gray MD;  Location: [facility] OR    BYPASS GRAFT FEMOROTIBIAL Left 10/27/2024    Procedure: CREATION, BYPASS, VASCULAR, FEMORAL TO TIBIAL REVISION OF BYPASS LEFT COMMON FEMORAL TO TIBIAL.;  Surgeon: Isaac  Raul Lee MD;  Location:  OR    CV CORONARY ANGIOGRAM N/A 11/27/2024    Procedure: Coronary Angiogram;  Surgeon: Clem Matt MD;  Location:  HEART CARDIAC CATH LAB    CV LOWER EXTREMITY ANGIOGRAM LEFT Left 10/27/2024    Procedure: Angiogram Lower Extremity Left;  Surgeon: Raul Gray MD;  Location:  OR    CV PCI N/A 11/27/2024    Procedure: Percutaneous Coronary Intervention;  Surgeon: Clem Matt MD;  Location:  HEART CARDIAC CATH LAB    IR LOWER EXTREMITY ANGIOGRAM LEFT  10/17/2024    IR LOWER EXTREMITY ANGIOGRAM RIGHT  1/23/2025    OR ANGIOGRAM, LOWER EXTREMITY Right 1/27/2025    Procedure: INTRAOPERATIVE ANGIOGRAM;  Surgeon: Patrick Hein MD;  Location:  OR        MEDICATIONS:  Reviewed in Epic. Current.     ALLERGIES:  No Known Allergies     SOCIAL HISTORY:   Social History     Socioeconomic History    Marital status:      Spouse name: Not on file    Number of children: Not on file    Years of education: Not on file    Highest education level: Not on file   Occupational History    Not on file   Tobacco Use    Smoking status: Former     Types: Cigarettes    Smokeless tobacco: Never   Vaping Use    Vaping status: Never Used   Substance and Sexual Activity    Alcohol use: Not Currently     Comment: quit 20 years    Drug use: Never    Sexual activity: Not on file   Other Topics Concern    Not on file   Social History Narrative    Not on file     Social Drivers of Health     Financial Resource Strain: Low Risk  (1/21/2025)    Financial Resource Strain     Within the past 12 months, have you or your family members you live with been unable to get utilities (heat, electricity) when it was really needed?: No   Food Insecurity: Low Risk  (1/21/2025)    Food Insecurity     Within the past 12 months, did you worry that your food would run out before you got money to buy more?: No     Within the past 12 months, did the food you bought just not last and you  didn t have money to get more?: No   Transportation Needs: High Risk (1/21/2025)    Transportation Needs     Within the past 12 months, has lack of transportation kept you from medical appointments, getting your medicines, non-medical meetings or appointments, work, or from getting things that you need?: Yes   Physical Activity: Unknown (10/8/2024)    Exercise Vital Sign     Days of Exercise per Week: 0 days     Minutes of Exercise per Session: Not on file   Stress: No Stress Concern Present (10/8/2024)    [place] citizen [place] of Occupational [employer] - Occupational Stress Questionnaire     Feeling of Stress : Only a little   Social Connections: Unknown (10/8/2024)    Social Connection and Isolation Panel [NHANES]     Frequency of Communication with Friends and Family: Not on file     Frequency of Social Gatherings with Friends and Family: Patient declined     Attends Zoroastrian Services: Not on file     Active Member of Clubs or Organizations: Not on file     Attends Club or Organization Meetings: Not on file     Marital Status: Not on file   Interpersonal Safety: Low Risk  (1/21/2025)    Interpersonal Safety     Do you feel physically and emotionally safe where you currently live?: Yes     Within the past 12 months, have you been hit, slapped, kicked or otherwise physically hurt by someone?: No     Within the past 12 months, have you been humiliated or emotionally abused in other ways by your partner or ex-partner?: No   Housing Stability: Low Risk  (1/21/2025)    Housing Stability     Do you have housing? : Yes     Are you worried about losing your housing?: No        FAMILY HISTORY: History reviewed. No pertinent family history.     EXAM:Vitals: BP 99/71   Pulse 87   Temp 98.8  F (37.1  C) (Oral)   Resp 12   Wt 76.2 kg (167 lb 15.9 oz)   SpO2 100%   BMI 22.16 kg/m    BMI= Body mass index is 22.16 kg/m .    LABS:     Last Comprehensive Metabolic Panel:  Sodium   Date Value Ref Range Status   01/28/2025 134 (L)  135 - 145 mmol/L Final     Potassium   Date Value Ref Range Status   01/28/2025 5.2 3.4 - 5.3 mmol/L Final     Chloride   Date Value Ref Range Status   01/28/2025 98 98 - 107 mmol/L Final     Carbon Dioxide (CO2)   Date Value Ref Range Status   01/28/2025 21 (L) 22 - 29 mmol/L Final     Anion Gap   Date Value Ref Range Status   01/28/2025 15 7 - 15 mmol/L Final     Glucose   Date Value Ref Range Status   01/28/2025 208 (H) 70 - 99 mg/dL Final     GLUCOSE BY METER POCT   Date Value Ref Range Status   01/28/2025 203 (H) 70 - 99 mg/dL Final     Urea Nitrogen   Date Value Ref Range Status   01/28/2025 51.3 (H) 8.0 - 23.0 mg/dL Final     Creatinine   Date Value Ref Range Status   01/28/2025 7.65 (H) 0.67 - 1.17 mg/dL Final     GFR Estimate   Date Value Ref Range Status   01/28/2025 7 (L) >60 mL/min/1.73m2 Final     Comment:     eGFR calculated using 2021 CKD-EPI equation.     Calcium   Date Value Ref Range Status   01/28/2025 9.6 8.8 - 10.4 mg/dL Final     Comment:     Reference intervals for this test were updated on 7/16/2024 to reflect our healthy population more accurately. There may be differences in the flagging of prior results with similar values performed with this method. Those prior results can be interpreted in the context of the updated reference intervals.     Lab Results   Component Value Date    WBC 13.0 01/22/2025     Lab Results   Component Value Date    RBC 3.49 01/22/2025     Lab Results   Component Value Date    HGB 10.8 01/22/2025     Lab Results   Component Value Date    HCT 33.8 01/22/2025     Lab Results   Component Value Date     01/22/2025      Lab Results   Component Value Date    INR 2.70 01/22/2025    INR 2.44 01/21/2025    INR 4.7 01/14/2025    INR 2.4 01/09/2025    INR 1.6 01/06/2025    INR 1.9 01/02/2025    INR 1.3 12/30/2024    INR 1.0 12/27/2024    INR 1.1 12/26/2024    INR 1.0 12/24/2024    INR 2.28 12/07/2024    INR 2.59 12/06/2024    INR 2.30 12/05/2024    INR 2.29 12/05/2024     INR 2.39 12/03/2024    INR 1.92 12/02/2024    INR 1.87 12/01/2024    INR 1.64 11/30/2024    INR 1.75 11/29/2024    INR 1.76 11/28/2024        General appearance: Patient is alert and fully cooperative with history & exam.  No sign of distress is noted during the visit.      Respiratory: Breathing is regular & unlabored while sitting.      HEENT: Hearing is intact to spoken word.  Speech is clear.  No gross evidence of visual impairment that would impact ambulation.      Dermatologic: Right heel: evaluated, with large area of gangrenous/dry tissue to posterior and plantar sites. No drainage from the site. Tender on palpation. Pain from heel to along posterior achilles. No cellulitis.      Vascular: Dorsalis pedis and posterior tibial pulses are difficult to palpate.     Neurologic: Lower extremity sensation is diminished, bilateral foot, to light touch.  No evidence of neurological-based weakness or contracture in the lower extremities.       Musculoskeletal: Patient is ambulatory without an assistive device or brace.  No gross foot or ankle deformity noted.       Psychiatric: Affect is pleasant & appropriate.      All cultures:  Recent Labs   Lab 01/24/25  1425 01/21/25  2346   CULTURE No growth after 3 days Staphylococcus aureus MRSA*        IMAGING:     Arterial US 1/22:    IMPRESSION:  1.  No blood flow demonstrated in the distal superficial femoral, popliteal, and runoff arteries. There may be trickle blood flow in the distal runoff arteries, though difficult to distinguish from artifact.     2.  Uncertain if stent is in the right lower extremity. Previous angiogram images are not available for review.    Right ankle MRI:   IMPRESSION:  1.  There is a soft tissue ulceration over the posterior aspect of the hindfoot with some surrounding edema or cellulitis but no evidence for abscess.  2.  No evidence for osteomyelitis.  3.  Small tibiotalar joint effusion.  4.  No evidence for fracture.  5.  Acute on chronic  plantar fasciitis. Plantar calcaneal spurring.  6.  No additional tendinous or ligamentous pathology.    I personally reviewed the images and agree with the reports as stated.    ASSESSMENT:  Right heel gangrene --> no osteomyelitis   Peripheral Arterial Disease s/p right fem - BK pop bypass  Diabetes Mellitus --> hba1c: 5.7     MEDICAL DECISION MAKING:   -Discussed all findings with patient. Chart and imaging reviewed.   -Patient seen in the ICU, remains intubated, but is awake and feels pain is improved some to right heel.   -Discussed with Dr. Hein, given the heel has remained dry and there's no osteomyelitis on MRI, would wait on any debridement of the site at this time. Blood flow appears improved, but tenuous and may not be enough to heel a large debridement.   -Palliatve consult planned for tomorrow.   -Recommend continued wound care and offloading for right now.   -Will follow peripherally.     ANGELITA Gardner Department of Podiatry/Foot & Ankle Surgery  Available via [employer]

## 2025-01-28 NOTE — ANESTHESIA CARE TRANSFER NOTE
Patient: Arnulfo Pritchett    Procedure: Procedure(s):  RIGHT FEMORAL ARTERY TO RIGHT POPLITEAL ARTERY ENDARTERECTOMY,  COMMON FEMORAL TO POPLITEAL BELOW KNEE COMPOSITE GREATER SAPHENOUS/PTFE BYPASS GRAFT. GREATER SAPHENOUS VEIN HARVEST  INTRAOPERATIVE ANGIOGRAM       Diagnosis: PAD (peripheral artery disease) [I73.9]  Diagnosis Additional Information: No value filed.    Anesthesia Type:   No value filed.     Note:    Oropharynx: endotracheal tube in place and ventilatory support  Level of Consciousness: iatrogenic sedation      Independent Airway: airway patency not satisfactory and stable    Vital Signs Stable: post-procedure vital signs reviewed and stable  Report to RN Given: handoff report given  Patient transferred to: ICU  Comments: Patient connected to SpO2, EKG, and arterial blood pressure transport monitors and accompanied by CRNA, circulating RN to ICU room. Patient ventilated by CRNA with ambu via ETT with O2 at 10 liters per minute during transport.     On arrival to ICU, endotracheal tube position unchanged, equal, bilateral breath sounds auscultated in ICU room, patient placed on ICU ventilator by respiratory therapist, teeth and oral mucosa intact and unchanged at handoff of care. At anesthesia handoff of care, clinical monitors and alarms on and functioning, report on patient's clinical status given to ICU RN, ICU staff questions answered  ICU Handoff: Call for PAUSE to initiate/utilize ICU HANDOFF, Identified Patient, Identified Responsible Provider, Reviewed the Pertinent Medical History, Discussed Surgical Course, Reviewed Intra-OP Anesthesia Management and Issues during Anesthesia, Set Expectations for Post Procedure Period and Allowed Opportunity for Questions and Acknowledgement of Understanding  Vitals:  Vitals Value Taken Time   BP     Temp     Pulse 86 01/27/25 2218   Resp 16 01/27/25 2218   SpO2 99 % 01/27/25 2218   Vitals shown include unfiled device data.    Electronically Signed By:  Dominique Nath, KEITH CRNA  January 27, 2025  10:20 PM

## 2025-01-28 NOTE — CONSULTS
"[facility] Nurse Inpatient Assessment     Consulted for: R heel ulcer    Summary: Unstageable pressure injuries to right heel and right lateral malleolus, present on admission. Vascular surgery and Podiatry also following. No plan for surgical intervention at this time.    [facility] nurse follow-up plan: weekly    Patient History (according to provider note(s):      \"Arnulfo Pritchett is a 64 year old male with PMH of ESRD (on peritoneal dialysis), diabetes mellitus, GERD, TALI, gout, CAD (h/o multiple stents), chronic paroxysmal A-fib (s/p ablation, on warfarin) , Chronic severe HFrEF, ischemic cardiomyopathy (EF 20-25%),  hyperlipidemia, PAD (s/p left common femoral endarterectomy and a left common femoral artery to tibioperoneal trunk bypass on 25 October 2024 and Follow-up VLADIMIR Doppler on 1/6/2025 noted critical resting arterial insufficiency of the right lower extremity), renal cell carcinoma (s/p nephrectomy) who presented to outside hospital with right leg pain along with shortness of breath or cough and was also found to have right leg ulcer along with right heel ulcer and transferred to [facility] for vascular surgery evaluation on 1/21/2025.\"    Assessment:      Areas visualized during today's visit: Focused:, Right, and lower leg    Pressure Injury Location: Right heel                        Last photo: 1/28  Wound type: Pressure Injury     Pressure Injury Stage: Unstageable, present on admission       Wound history/plan of care: Wound is NICO on writer's arrival. Right heel is off-loaded with pillow. Patient had RLE angiogram 1/27 with Vascular Survery. Podiatry is also following patient this admission.     Wound base: Right heel-100 % Eschar and Dry, Right lateral malleolus 100 % Maroon, Attached, and non-viable tissue     Palpation of the wound bed: firm      Drainage: none     Description of drainage: none     Measurements (length x width x depth, in cm) R heel- 6.5  x 5  x  " "<0.1 cm; R lateral malleolus- 2.5 x 1.5 x <0.1 cm     Tunneling N/A     Undermining N/A  Periwound skin: Edematous      Color: dusky      Temperature: normal   Odor: none  Pain: unable to assess due to  sedation ,   Pain intervention prior to dressing change: slow and gentle cares   Treatment goal: Keep dry eschar stable to prevent wet gangrene  STATUS: initial assessment  Supplies ordered: gathered, at bedside, supplies stored on unit, discussed with RN, and discussed with patient     My PI Risk Assessment     Sensory Perception: 2 - Very Limited     Moisture: 4 - Rarely moist     Activity: 1 - Bedfast      Mobility: 1 - Completely immobile      Nutrition: 2 - Probably inadequate      Friction/Shear: 1 - Problem     TOTAL: 11      Treatment Plan:     Right heel and lateral malleolus wound(s): Daily   \"Paint\" wounds with betadine and let dry. Cover with dry gauze/ABD and wrap with Kerlix. Secure with Medipore tape. Keep heel off-loaded at all times with Rooke boot.    Pressure Injury Prevention (PIP) Plan:  -If patient is declining pressure injury prevention interventions: Explore reason why and address patient's concerns, Educate on pressure injury risk and prevention intervention(s), If patient is still declining, document \"informed refusal\" , and Ensure Care team is aware ( provider, charge nurse, etc)  -Mattress: Add DASH pump to mattress PRN moisture issues  -HOB: Maintain at or below 30 degrees, unless contraindicated  -Repositioning in bed: Every 1-2 hours , Left/right positioning; avoid supine, and Raise foot of bed prior to raising head of bed, to reduce patient sliding down (shear)  -Heels: Keep elevated off mattress and Rooke boot to right heel  -Protective Dressing: None  -Positioning Equipment:  Pillows  -Moisture Management: Perineal cleansing /protection: Follow Incontinence Protocol, Avoid brief in bed, Clean and dry skin folds with bathing , and Moisturize dry skin  -Under Devices: Inspect skin under " "all medical devices during skin inspection , Ensure tubes are stabilized without tension, and Ensure patient is not lying on medical devices or equipment when repositioned     Orders: Written    RECOMMEND PRIMARY TEAM ORDER: None, at this time  Education provided: Off-loading pressure  Discussed plan of care with: Patient  Notify [facility] if wound(s) deteriorate.  Nursing to notify the Provider(s) and re-consult the WO Nurse if new skin concern.    DATA:     Current support surface: Standard  Standard gel mattress (Isoflex)  Containment of urine/stool: Incontinence Protocol, Incontinent pad in bed, and PD  BMI: Body mass index is 22.16 kg/m .   Active diet order: Orders Placed This Encounter      Advance Diet as Tolerated: Clear Liquid Diet      NPO for Medical/Clinical Reasons Except for: Meds     Output: I/O last 3 completed shifts:  In: 2375.25 [I.V.:1535.25; NG/GT:40]  Out: 200 [Blood:200]     Labs:   Recent Labs   Lab 01/28/25  0425 01/27/25  2230 01/27/25  2226 01/27/25  1852   ALBUMIN  --   --   --  2.3*   HGB 9.2*  --    < > 8.6*   INR  --  1.77*  --   --    WBC 16.7*  --    < > 14.4*    < > = values in this interval not displayed.     Pressure injury risk assessment:   Sensory Perception: 2-->very limited  Moisture: 3-->occasionally moist  Activity: 2-->chairfast  Mobility: 2-->very limited  Nutrition: 2-->probably inadequate  Friction and Shear: 2-->potential problem  Matias Score: 13    Evelia Hoffmann RN, CWOCN  Please contact via [employer] at group \"[facility] nurse\"- M-F [phone]  "

## 2025-01-28 NOTE — OP NOTE
OPERATIVE NOTE    PROCEDURE DATE: 1/27/2025      PRE-OP DIAGNOSIS: Severe peripheral artery disease with nonhealing right heel and toe ulcers      POST-OP DIAGNOSES: Same      PROCEDURE PERFORMED: #1.  Right popliteal/tibial interoperative angiogram    #2.  Right common femoral and below-knee popliteal/tibial endarterectomy    #3.  Right common femoral to below-knee popliteal/tibial composite greater saphenous vein-6 mm   ringed PTFE Propaten bypass  #4.  [place] right groin greater saphenous vein      SURGEON:  Patrick Hein M.D.      ASSISTANT:  Lizett Davis MD  (vascular fellow)    Milo Carrion MD (vascular resident        ANESTHESIA:  General-endotracheal      PRE-OP MEDICATIONS: Scheduled IV Zosyn      INDICATIONS FOR PROCEDURE: 64-year-old patient with severe PAD and peritoneal dialysis [place] developed a large necrotic right heel ulcer along with necrosis of his toes.  Angiography revealed Plaque within the common femoral artery with moderate stenosis.  Superficial femoral and popliteal arteries were occluded.  Collaterals refilled the below-knee popliteal artery is very questionable whether the anterior tibial artery was open.  It.  The peroneal artery is open.  He has a inadequate vein for distal bypass.  We felt that a PTFE bypass graft would be indicated if the distal target was found for attempted limb salvage.                 Patient has a worsening pneumonia but is still breathing easily.  We did not feel we could continue to delay the operation any longer if there is any chance of limb salvage and patient was aware of this and agreed to proceed.      DESCRIPTION OF PROCEDURE: Brought the op induced under general anesthesia oriented without difficulty.  Anesthesia staff placed a right jugular central line.  No Rosario catheter was necessary due to his renal failure.  Appropriate padding on the left leg where he has a patent bypass graft.  Right leg and foot were prepped and draped isolating out the foot.       POPLITEAL EXPOSURE and ANGIOGRAM: Timeout was called and the sites were identified.  Made a below-knee incision in the stent patient.  Dissection was carried down to the fascia.  Semitendinosis tendon was divided.  We exposed the below-knee popliteal artery which was markedly calcified.  We opened up the deep posterior compartment.  The tibial veins were divided between 3-0 silk suture for exposure of the anterior tibial artery and tibioperoneal trunk.  The artery just below the trifurcation was slightly softer and some softness to the anterior tibial artery and tibioperoneal trunk which were encircled with Silastic Vesseloops.      We then transected the distal below-knee popliteal artery.  This was occluded proximally.  There was a patent lumen.  Burrell was extended the arteriotomy in the tibioperoneal trunk.  Performed endarterectomy on the popliteal artery-proximal tibial peroneal trunk and anterior tibial artery.  The latter excepted a 2.5 mm dilator was irrigated with heparinized saline solution.     Using 3/4 strength contrast we then performed selective angiograms of the anterior tibial artery which was very diseased but appeared to be patent along with the tibial peroneal trunk with the peroneal artery.  Be patent but again severe disease.  I did not feel that we would have any better distal target and since it did show patency on the angiogram and we could irrigated with heparinized saline solution this would be a distal target     FEMORAL EXPOSURE: Incision was made in the right groin.  Dissection was carried down to the markedly calcified and femoral arteries.  Superficial femoral was occluded.  Profundus femoral artery was quite calcified.  We did find a soft spot on the external iliac artery for a vascular clamp.  5000 units intravenous heparin were given.  Vascular clamps were applied we opened up to The proximal superficial femoral and extending this up the common femoral artery.  A #4 Julio César  balloon was used to occlude the backbleeding for the profundus femoral artery avoid any clamping.  Endarterectomy was performed on the common femoral artery proximal superficial femoral.  Endarterectomy did not need to be extended into the profundus femoral artery and the endpoint was tacked down with 6-0 Prolene sutures going into this artery.  We then closed the proximal exposed SFA with running 6-0 Prolene and the common femoral artery to the level of the profundus femoral artery with running 6-0 Prolene      GSV HARVEST: Since we plan to use PTFE graft we are very concerned about potential infection.  We therefore harvested the greater saphenous vein so we would have a autogenous conduit coming off her endarterectomy in case the PTFE graft became infected and we could ligate this without redoing the entire common femoral artery.  This was harvested a length of 5 cm.  5000 units intravenous heparin given.  Vascular clamp was applied to the saphenofemoral junction the GSV was transected.  This was oversewn with a running 5-0 Prolene suture.  The first valve was cut and direct visualization.    COMPOSITE BYPASS: We selected a 6 mm thin-walled ringed Propaten bypass.  Distal end was trimmed obliquely and this was sewn end-to-side to the distal popliteal and tibial peroneal endarterectomy segment with running 6-0 Prolene suture modification.  We then brought the graft through a subcutaneous tunnel from the knee up to the groin with a [place] tunneler.  The greater saphenous vein was sewn to the opening left within the distal common femoral artery with running 6-0 Prolene suture.  With release of the vascular clamps with a strong pulse and good hemostasis.  Modified Song Eletone was used to cut the 1 remaining valve leaflet with excellent pulsatile flow.  We then performed a spatulated anastomosis to the PTFE graft and GSV with running 6-0 Prolene suture.  We had given additional 3000 units intravenous heparin.  Upon  completion there was some kinking and redundancy of the distal GSV which had some calcium within it.  We felt that this was not adequate.  We reapplied a padded bulldog clamp and a vascular clamp and transected this segment and went up to the first valve sinus on the GSV and debridement performed in the spatulated end-to-end anastomosis with a running 6-0 Prolene suture.  Upon completion we had a strong pulse within the graft.  We did have some needle hole bleeding that needed to be corrected with vein pledgeted    6-0 Prolene sutures on the distal anastomosis and also to the PTFE/GSV anastomosis.  We gave 40 g of protamine due to his coagulopathy from the dialysis and the heparin.  With time the needle hole bleeding all stopped.  We had a strong biphasic signal at the anterior tibial artery and monophasic within the tibial peroneal trunk.  Very minimal Doppler flow in the foot but we felt this could not be improved.    Upon completion all fields were dry.  We reapproximated the distal fascia in the calf with mattress 2-0 Vicryl.  The groin was closed in multiple layers with interrupted and running 2-0 and 3-0 Vicryl suture.  Subcutaneous tissue of the calf closed with interrupted 3-0 Vicryl.  Skin was closed with 4-0 Monocryl in subcu to fashion of both incisions followed by 0.5% Marcaine.  Surgical adhesive and Tegaderm dressing applied.    We did not feel with the patient's coagulopathy that any debridement of the foot was indicated at this time and we did notice there was dry gangrene with no gross purulent infection.      Patient tolerated procedure well.  Due to his pneumonia he was still left intubated and transferred to ICU.    Sponge count correct x 2.        EBL: 250 mL      COMPLICATIONS: Calcified vessels.  Excellent inflow upon completion.  Very poor outflow in the tibial arteries that cannot be corrected.      OPERATIVE FINDINGS: Greater saphenous conduit measured 4 cm in length.    Patient received 1  unit packed red blood cells due to interoperative acute blood loss anemia but also anemia related to his chronic disease.    Due to the very poor outflow this graft does not work nothing further can be done from a vascular standpoint for limb salvage-this was discussed with his son Ervin post procedure      Patrick Hein MD

## 2025-01-28 NOTE — PROGRESS NOTES
Care Management Follow Up    Length of Stay (days): 7    Expected Discharge Date: 01/29/2025     Concerns to be Addressed: discharge planning     Patient plan of care discussed at interdisciplinary rounds: Yes    Anticipated Discharge Disposition: Home Care versus TCU      Anticipated Discharge Services:    Anticipated Discharge DME:      Patient/family educated on Medicare website which has current facility and service quality ratings:    Education Provided on the Discharge Plan:    Patient/Family in Agreement with the Plan: yes    Referrals Placed by CM/SW:    Private pay costs discussed: Not applicable    Discussed  Partnership in Safe Discharge Planning  document with patient/family: Yes:     Handoff Completed: Yes, non-[facility] PCP: External handoff communication completed    Additional Information:  Patient moved to ICU and requires ICU level of care    Next Steps: SW/CC continue to follow for any discharge planning needs    KATELYN Duarte

## 2025-01-28 NOTE — ANESTHESIA PROCEDURE NOTES
Central Line/PA Catheter Placement    Pre-Procedure   Staff -        Anesthesiologist:  Brissa Chahal MD       Performed By: anesthesiologist       Location: OR       Pre-Anesthestic Checklist: patient identified, IV checked, site marked, risks and benefits discussed, informed consent, monitors and equipment checked, pre-op evaluation and at physician/surgeon's request  Timeout:       Correct Patient: Yes        Correct Procedure: Yes        Correct Site: Yes        Correct Position: Yes        Correct Laterality: Yes   Line Placement:   This line was placed Post Induction    Procedure   Procedure: central line       Laterality: right       Insertion Site: internal jugular.       Patient Position: Trendelenburg  Sterile Prep        All elements of maximal sterile barrier technique followed       Patient Prep/Sterile Barriers: draped, hand hygiene, gloves , hat , mask , draped, gown, sterile gel and probe cover       Skin prep: Chloraprep  Insertion/Injection        Technique: ultrasound guided        1. Ultrasound was used to evaluate the access site.       2. Vein evaluated via ultrasound for patency/adequacy.       3. Using real-time ultrasound the needle/catheter was observed entering the artery/vein.       4. Permanent image was captured and entered into the patient's record.       5. The visualized structures were anatomically normal.       6. There were no apparent abnormal pathologic findings.       Introducer Type: 9 Fr, 10 cm        Type: Introducer  Narrative         Secured by: suture       Tegaderm and Biopatch dressing used.       Complications: None apparent,        blood aspirated from all lumens,        All lumens flushed: Yes       Verification method: Placement to be verified post-op   Comments:  TL SLIC threaded

## 2025-01-28 NOTE — PROGRESS NOTES
[facility] ICU RESPIRATORY NOTE        Date of Admission: 1/21/2025    Date of Intubation (most recent): 01/27/2025    Reason for Mechanical Ventilation: AW Protection    Number of Days on Mechanical Ventilation: Day 2    Met Criteria for Spontaneous Breathing Trial: Yes - 5/5, 30% since 1120    Bite Block: No    Significant Events Today: None    ABG Results:   Recent Labs   Lab 01/28/25  1345 01/28/25  1218 01/28/25  0854 01/27/25  2229   PH 7.29* 7.28* 7.29* 7.35   PCO2 45 44 45 39   PO2 117* 114* 150* 190*   HCO3 21 21 22 21   O2PER 30 30 40 60         Current Vent Settings: FiO2 (%): 30 %, Resp: 13, Vent Mode: CPAP/PS, Resp Rate (Set): 14 breaths/min, Tidal Volume (Set, mL): 450 mL, PEEP (cm H2O): 5 cmH2O, Pressure Support (cm H2O): 5 cmH2O, Resp Rate (Set): 14 breaths/min, Tidal Volume (Set, mL): 450 mL, PEEP (cm H2O): 5 cmH2O        Armani Montoya, RT on 1/28/2025 at 5:29 PM

## 2025-01-29 ENCOUNTER — APPOINTMENT (OUTPATIENT)
Dept: GENERAL RADIOLOGY | Facility: CLINIC | Age: 65
DRG: 853 | End: 2025-01-29
Attending: INTERNAL MEDICINE
Payer: MEDICARE

## 2025-01-29 LAB
ALLEN'S TEST: ABNORMAL
ALLEN'S TEST: ABNORMAL
ANION GAP SERPL CALCULATED.3IONS-SCNC: 18 MMOL/L (ref 7–15)
B-OH-BUTYR SERPL-SCNC: <0.18 MMOL/L
BACTERIA PLR CULT: NO GROWTH
BASE EXCESS BLDA CALC-SCNC: -3.5 MMOL/L (ref -3–3)
BASE EXCESS BLDA CALC-SCNC: -5.1 MMOL/L (ref -3–3)
BUN SERPL-MCNC: 56.7 MG/DL (ref 8–23)
CALCIUM SERPL-MCNC: 9.6 MG/DL (ref 8.8–10.4)
CHLORIDE SERPL-SCNC: 95 MMOL/L (ref 98–107)
CREAT SERPL-MCNC: 7.69 MG/DL (ref 0.67–1.17)
EGFRCR SERPLBLD CKD-EPI 2021: 7 ML/MIN/1.73M2
ERYTHROCYTE [DISTWIDTH] IN BLOOD BY AUTOMATED COUNT: 18.3 % (ref 10–15)
GLUCOSE BLDC GLUCOMTR-MCNC: 125 MG/DL (ref 70–99)
GLUCOSE BLDC GLUCOMTR-MCNC: 152 MG/DL (ref 70–99)
GLUCOSE BLDC GLUCOMTR-MCNC: 154 MG/DL (ref 70–99)
GLUCOSE BLDC GLUCOMTR-MCNC: 242 MG/DL (ref 70–99)
GLUCOSE BLDC GLUCOMTR-MCNC: 249 MG/DL (ref 70–99)
GLUCOSE BLDC GLUCOMTR-MCNC: 289 MG/DL (ref 70–99)
GLUCOSE BLDC GLUCOMTR-MCNC: 301 MG/DL (ref 70–99)
GLUCOSE SERPL-MCNC: 326 MG/DL (ref 70–99)
GRAM STAIN RESULT: NORMAL
GRAM STAIN RESULT: NORMAL
HCO3 BLD-SCNC: 21 MMOL/L (ref 21–28)
HCO3 BLD-SCNC: 23 MMOL/L (ref 21–28)
HCO3 SERPL-SCNC: 20 MMOL/L (ref 22–29)
HCT VFR BLD AUTO: 26.4 % (ref 40–53)
HGB BLD-MCNC: 8.4 G/DL (ref 13.3–17.7)
MAGNESIUM SERPL-MCNC: 2 MG/DL (ref 1.7–2.3)
MCH RBC QN AUTO: 30.7 PG (ref 26.5–33)
MCHC RBC AUTO-ENTMCNC: 31.8 G/DL (ref 31.5–36.5)
MCV RBC AUTO: 96 FL (ref 78–100)
O2/TOTAL GAS SETTING VFR VENT: 30 %
O2/TOTAL GAS SETTING VFR VENT: 30 %
OXYHGB MFR BLDA: 97 % (ref 92–100)
OXYHGB MFR BLDA: 97 % (ref 92–100)
PCO2 BLD: 44 MM HG (ref 35–45)
PCO2 BLD: 50 MM HG (ref 35–45)
PEEP: 5 CM H2O
PH BLD: 7.28 [PH] (ref 7.35–7.45)
PH BLD: 7.29 [PH] (ref 7.35–7.45)
PHOSPHATE SERPL-MCNC: 7.9 MG/DL (ref 2.5–4.5)
PLATELET # BLD AUTO: 281 10E3/UL (ref 150–450)
PO2 BLD: 117 MM HG (ref 80–105)
PO2 BLD: 130 MM HG (ref 80–105)
POTASSIUM SERPL-SCNC: 4.4 MMOL/L (ref 3.4–5.3)
RBC # BLD AUTO: 2.74 10E6/UL (ref 4.4–5.9)
SAO2 % BLDA: 98.8 % (ref 96–97)
SAO2 % BLDA: 99.1 % (ref 96–97)
SODIUM SERPL-SCNC: 133 MMOL/L (ref 135–145)
UFH PPP CHRO-ACNC: 0.16 IU/ML
UFH PPP CHRO-ACNC: 0.33 IU/ML
UFH PPP CHRO-ACNC: <0.1 IU/ML
WBC # BLD AUTO: 14.1 10E3/UL (ref 4–11)

## 2025-01-29 PROCEDURE — 250N000011 HC RX IP 250 OP 636

## 2025-01-29 PROCEDURE — 250N000013 HC RX MED GY IP 250 OP 250 PS 637

## 2025-01-29 PROCEDURE — 82374 ASSAY BLOOD CARBON DIOXIDE: CPT

## 2025-01-29 PROCEDURE — 85014 HEMATOCRIT: CPT

## 2025-01-29 PROCEDURE — 90945 DIALYSIS ONE EVALUATION: CPT

## 2025-01-29 PROCEDURE — 99233 SBSQ HOSP IP/OBS HIGH 50: CPT | Performed by: INTERNAL MEDICINE

## 2025-01-29 PROCEDURE — 94003 VENT MGMT INPAT SUBQ DAY: CPT

## 2025-01-29 PROCEDURE — 250N000013 HC RX MED GY IP 250 OP 250 PS 637: Performed by: INTERNAL MEDICINE

## 2025-01-29 PROCEDURE — 85520 HEPARIN ASSAY: CPT | Performed by: STUDENT IN AN ORGANIZED HEALTH CARE EDUCATION/TRAINING PROGRAM

## 2025-01-29 PROCEDURE — 200N000001 HC R&B ICU

## 2025-01-29 PROCEDURE — 99291 CRITICAL CARE FIRST HOUR: CPT | Mod: 24 | Performed by: INTERNAL MEDICINE

## 2025-01-29 PROCEDURE — 80048 BASIC METABOLIC PNL TOTAL CA: CPT

## 2025-01-29 PROCEDURE — 85048 AUTOMATED LEUKOCYTE COUNT: CPT

## 2025-01-29 PROCEDURE — 71045 X-RAY EXAM CHEST 1 VIEW: CPT

## 2025-01-29 PROCEDURE — 82010 KETONE BODYS QUAN: CPT

## 2025-01-29 PROCEDURE — 84100 ASSAY OF PHOSPHORUS: CPT

## 2025-01-29 PROCEDURE — 999N000259 HC STATISTIC EXTUBATION

## 2025-01-29 PROCEDURE — 82805 BLOOD GASES W/O2 SATURATION: CPT

## 2025-01-29 PROCEDURE — 83735 ASSAY OF MAGNESIUM: CPT

## 2025-01-29 PROCEDURE — 250N000013 HC RX MED GY IP 250 OP 250 PS 637: Performed by: STUDENT IN AN ORGANIZED HEALTH CARE EDUCATION/TRAINING PROGRAM

## 2025-01-29 PROCEDURE — 82565 ASSAY OF CREATININE: CPT

## 2025-01-29 PROCEDURE — 99232 SBSQ HOSP IP/OBS MODERATE 35: CPT | Performed by: REGISTERED NURSE

## 2025-01-29 PROCEDURE — 999N000157 HC STATISTIC RCP TIME EA 10 MIN

## 2025-01-29 PROCEDURE — 999N000253 HC STATISTIC WEANING TRIALS

## 2025-01-29 PROCEDURE — 82805 BLOOD GASES W/O2 SATURATION: CPT | Performed by: STUDENT IN AN ORGANIZED HEALTH CARE EDUCATION/TRAINING PROGRAM

## 2025-01-29 PROCEDURE — 85520 HEPARIN ASSAY: CPT | Performed by: INTERNAL MEDICINE

## 2025-01-29 PROCEDURE — 85018 HEMOGLOBIN: CPT

## 2025-01-29 PROCEDURE — 250N000009 HC RX 250

## 2025-01-29 PROCEDURE — 250N000012 HC RX MED GY IP 250 OP 636 PS 637: Performed by: STUDENT IN AN ORGANIZED HEALTH CARE EDUCATION/TRAINING PROGRAM

## 2025-01-29 PROCEDURE — 82310 ASSAY OF CALCIUM: CPT

## 2025-01-29 PROCEDURE — 85520 HEPARIN ASSAY: CPT

## 2025-01-29 PROCEDURE — 82435 ASSAY OF BLOOD CHLORIDE: CPT

## 2025-01-29 RX ORDER — ACETAMINOPHEN 325 MG/1
975 TABLET ORAL EVERY 8 HOURS
Status: DISCONTINUED | OUTPATIENT
Start: 2025-01-29 | End: 2025-02-11

## 2025-01-29 RX ORDER — GENTAMICIN SULFATE 1 MG/G
CREAM TOPICAL DAILY PRN
Status: DISCONTINUED | OUTPATIENT
Start: 2025-01-29 | End: 2025-01-29

## 2025-01-29 RX ORDER — CALCITRIOL 0.25 UG/1
0.25 CAPSULE, LIQUID FILLED ORAL AT BEDTIME
Status: DISCONTINUED | OUTPATIENT
Start: 2025-01-29 | End: 2025-03-01 | Stop reason: HOSPADM

## 2025-01-29 RX ORDER — SEVELAMER CARBONATE 800 MG/1
800 TABLET, FILM COATED ORAL
Status: DISCONTINUED | OUTPATIENT
Start: 2025-01-30 | End: 2025-03-01 | Stop reason: HOSPADM

## 2025-01-29 RX ORDER — ACETAMINOPHEN 650 MG/1
650 SUPPOSITORY RECTAL EVERY 8 HOURS
Status: DISCONTINUED | OUTPATIENT
Start: 2025-01-29 | End: 2025-02-11

## 2025-01-29 RX ORDER — SODIUM CHLORIDE 9 MG/ML
INJECTION, SOLUTION INTRAVENOUS CONTINUOUS
Status: DISCONTINUED | OUTPATIENT
Start: 2025-01-29 | End: 2025-02-04

## 2025-01-29 RX ADMIN — CALCITRIOL CAPSULES 0.25 MCG 0.25 MCG: 0.25 CAPSULE ORAL at 20:46

## 2025-01-29 RX ADMIN — PIPERACILLIN AND TAZOBACTAM 2.25 G: 2; .25 INJECTION, POWDER, FOR SOLUTION INTRAVENOUS at 10:41

## 2025-01-29 RX ADMIN — PIPERACILLIN AND TAZOBACTAM 2.25 G: 2; .25 INJECTION, POWDER, FOR SOLUTION INTRAVENOUS at 20:28

## 2025-01-29 RX ADMIN — SEVELAMER CARBONATE 0.8 G: 800 POWDER, FOR SUSPENSION ORAL at 08:26

## 2025-01-29 RX ADMIN — HEPARIN SODIUM 1600 UNITS/HR: 10000 INJECTION, SOLUTION INTRAVENOUS at 07:34

## 2025-01-29 RX ADMIN — ACETAMINOPHEN 975 MG: 325 TABLET, FILM COATED ORAL at 20:35

## 2025-01-29 RX ADMIN — ACETAMINOPHEN 975 MG: 325 SUSPENSION ORAL at 03:30

## 2025-01-29 RX ADMIN — PANTOPRAZOLE SODIUM 40 MG: 40 INJECTION, POWDER, FOR SOLUTION INTRAVENOUS at 08:16

## 2025-01-29 RX ADMIN — POLYETHYLENE GLYCOL 3350 17 G: 17 POWDER, FOR SOLUTION ORAL at 08:16

## 2025-01-29 RX ADMIN — SENNOSIDES AND DOCUSATE SODIUM 1 TABLET: 50; 8.6 TABLET ORAL at 08:17

## 2025-01-29 RX ADMIN — ATORVASTATIN CALCIUM 40 MG: 40 TABLET, FILM COATED ORAL at 20:36

## 2025-01-29 RX ADMIN — INSULIN GLARGINE 7 UNITS: 100 INJECTION, SOLUTION SUBCUTANEOUS at 10:41

## 2025-01-29 RX ADMIN — OXYCODONE HYDROCHLORIDE 5 MG: 5 TABLET ORAL at 08:26

## 2025-01-29 RX ADMIN — ACETAMINOPHEN 975 MG: 325 TABLET, FILM COATED ORAL at 11:40

## 2025-01-29 RX ADMIN — HEPARIN SODIUM 1750 UNITS/HR: 10000 INJECTION, SOLUTION INTRAVENOUS at 22:00

## 2025-01-29 RX ADMIN — OXYCODONE HYDROCHLORIDE 5 MG: 5 TABLET ORAL at 03:49

## 2025-01-29 RX ADMIN — Medication 5 ML: at 08:26

## 2025-01-29 RX ADMIN — PIPERACILLIN AND TAZOBACTAM 2.25 G: 2; .25 INJECTION, POWDER, FOR SOLUTION INTRAVENOUS at 03:30

## 2025-01-29 RX ADMIN — CINACALCET 60 MG: 30 TABLET ORAL at 20:35

## 2025-01-29 RX ADMIN — CHLORHEXIDINE GLUCONATE 15 ML: 1.2 SOLUTION ORAL at 08:16

## 2025-01-29 RX ADMIN — SEVELAMER CARBONATE 0.8 G: 800 POWDER, FOR SUSPENSION ORAL at 19:04

## 2025-01-29 RX ADMIN — OXYCODONE HYDROCHLORIDE 2.5 MG: 5 TABLET ORAL at 20:46

## 2025-01-29 RX ADMIN — CLOPIDOGREL BISULFATE 75 MG: 75 TABLET ORAL at 20:35

## 2025-01-29 RX ADMIN — SEVELAMER CARBONATE 0.8 G: 800 POWDER, FOR SUSPENSION ORAL at 11:40

## 2025-01-29 RX ADMIN — Medication 60 ML: at 08:26

## 2025-01-29 RX ADMIN — SENNOSIDES AND DOCUSATE SODIUM 1 TABLET: 50; 8.6 TABLET ORAL at 20:36

## 2025-01-29 ASSESSMENT — ACTIVITIES OF DAILY LIVING (ADL)
ADLS_ACUITY_SCORE: 61
ADLS_ACUITY_SCORE: 68
ADLS_ACUITY_SCORE: 61
ADLS_ACUITY_SCORE: 72
ADLS_ACUITY_SCORE: 73
ADLS_ACUITY_SCORE: 61
ADLS_ACUITY_SCORE: 72
ADLS_ACUITY_SCORE: 61
ADLS_ACUITY_SCORE: 73
ADLS_ACUITY_SCORE: 61
ADLS_ACUITY_SCORE: 73
ADLS_ACUITY_SCORE: 73
ADLS_ACUITY_SCORE: 61
ADLS_ACUITY_SCORE: 72
ADLS_ACUITY_SCORE: 61
ADLS_ACUITY_SCORE: 72
ADLS_ACUITY_SCORE: 61
ADLS_ACUITY_SCORE: 61
ADLS_ACUITY_SCORE: 73
ADLS_ACUITY_SCORE: 61
ADLS_ACUITY_SCORE: 72
ADLS_ACUITY_SCORE: 72
ADLS_ACUITY_SCORE: 73

## 2025-01-29 NOTE — PROGRESS NOTES
PALLIATIVE CARE PROGRESS NOTE  [facility]     Patient Name: Arnulfo Pritchett  Date of Admission: 1/21/2025   Today the patient was seen for: goals of care, advanced care planning.     Recommendations & Counseling       GOALS OF CARE:   Life-prolonging without limits   Reviewed quality of life as patient will not likely return to independent living after this hospitalization. Family was helping Arnulfo out quite frequently at his apartment prior to this hospitalization. They have been looking into assisted livings that offer a high level of support but [place] Peritoneal dialysis could be a limiting factor for senior care setting as well.   Discussed hospice as an option if Arnulfo should no longer wish to continue dialysis and restorative medical treatments.   Arnulfo was unable to participate fully in the discussion today the decision was made to continue goals of care discussion in the days ahead.  Palliative care will continue to follow and check in with patient and family.    ADVANCE CARE PLANNING:  Son Ervin had patient's advance directive on his laptop and we reviewed the information with patient.  Ervin sent the directive to me so it could be sent to honoring choices for approval but I did not receive the email.  Will reconnect with Ervin to have him send the directive via email again.  There is no POLST form on file, defer to patient and/or next of kin for decisions   Code status: Full Code    DECISION MAKING:  Patient's decision making preferences: shared with support from loved ones  Patient has decision-making capacity today for complex decisions:Questionable he is currently intubated and fatigued-is intermittently engaged.    MEDICAL MANAGEMENT:   We are not actively managing symptoms at this time.    Delirium- multifactorial, related to infections, respiratory compromise, ICU,  medications.  -Maintain day/night routines and orientation.  -Avoid medications  such as steroids, benzodiazepines and anticholinergics.   -Optimize hydration/nutrition, and sleep.  -Utilize sensory aids to maintain orientation such as eye glasses, hearing aids or pocket talkers.  -Calm environment, quiet/reassuring voices, orienting cues, minimized noise/night disruptions, when possible.   -Maximize mobility and prevent/treat pain.      PSYCHOSOCIAL/SPIRITUAL SUPPORT:  Supportive family members- 2 daughters and a son who assist Arnulfo often.    Palliative Care will continue to follow. Thank you for the consult and allowing us to aid in the care of Arnulfo Pritchett.    Discussed patient case and goals of care discussion with ICU fellow    Coby Stone, CNS  Securely message with Vocera (more info)  Text page via [employer] Paging/Directory       Chart documentation was completed, in part, with [employer] voice-recognition software. Even though reviewed, some grammatical, spelling, and word errors may remain.         Interval History:     Patient had been intubated after his vascular surgery for airway protection.  He will be extubated today, evaluated by SLP and may be out of ICU soon.  Meeting had been set up with family prior to the surgery and the hope was that patient would be extubated by the time we had her meeting today no he could participate more but he remained intubated during our meeting.  He could shake his head yes or no occasionally but drifted off to sleep intermittently.      We did review his healthcare directive that son Ervin had on his laptop.  It indicated that mental status was important to patient and that he would not want to be kept alive by machines if he could not interact or know what was going on around him.  We reviewed concerns regarding his ability to remain living independently after this hospitalization as he required help with his peritoneal dialysis as he could not lift the heavy fluid bags.  Family and friends would assist with this daily.  He had a RN that would  assist with setting up peritoneal dialysis a few times a week.  The family was wondering if an assisted living would be a possibility but we discussed much would depend on how he can recover from this hospitalization and surgery, how he does in rehab if he can qualify, if an assisted living could accommodate peritoneal dialysis.  We discussed how hemodialysis could be an option if the peritoneal dialysis made things too complex for his living situation but patient did not respond well to hemodialysis in the past as it made him really fatigued.    We discussed that these are important quality of life issues to keep in mind as decisions are made in the days ahead.  We also discussed hospice as an option if he should no longer wish to continue dialysis and restorative medical treatments.  We reviewed that dying from kidney failure is a fairly comfortable process without pain and how people generally become less responsive, go into a coma and pass away.  Reviewed hospice services they offer the settings that are available such as home, skilled nursing facility or hospice house.    Family would like to continue goals of care discussion when patient is more alert and able to participate fully in the conversation.  Palliative care will continue to follow and assist with goals of care discussion in the days ahead.       Assessment          Arnulfo Pritchett is a 64 year old male with a past medical history of renal cell carcinoma s/p right nephrectomy, ESRD on peritoneal dialysis, gout, peripheral artery disease with critical resting limb arterial insufficiency of right LE,  left common femoral endarterectomy and a left common femoral artery to tibioperoneal trunk bypass  10/2024, chronic paroxysmal A-fib, CAD s/p multiple stents, anemia, TALI, and recent pneumonia who presented to [facility] ED on 1/21/2025 with leg pain and shortness of breath.       Upon further evaluation he was found to have a moderate to large  right pleural effusion s/p thoracentesis 1/24, right leg and right heel gangrene/ulcer and occluded right SFA stent.  The plan is for patient to undergo R fem-BK pop bypass with PTFE, angiogram, and possible debridement of R heel this afternoon.      12/05-07: Hypotension.  11/27 - 12/03: NSTEMI versus nonischemic MRI due to/hypotension            Review of Systems:     Besides above, ROS was reviewed and is unremarkable           Physical Exam:   Temp:  [98  F (36.7  C)-98.9  F (37.2  C)] 98  F (36.7  C)  Pulse:  [] 96  Resp:  [12-26] 12  BP: ()/(63-89) 98/67  MAP:  [52 mmHg-93 mmHg] 76 mmHg  Arterial Line BP: ()/(-14-75) 105/60  FiO2 (%):  [30 %] 30 %  SpO2:  [88 %-100 %] 98 %  173 lbs .98 oz    Physical Exam  GENERAL:  Alert, fatigued, no distress, intubated, frail weak, appears comfortable. HEAD: Normocephalic atraumatic  SCLERA: Anicteric  ABDOMEN:  Non distended  RESPIRATORY: Breathing via ET tube.   NEUROLOGIC: intermittently alert  PSYCH: Calm             Current Problem List:   Active Problems:    Cellulitis      No Known Allergies         Data Reviewed:     Results for orders placed or performed during the hospital encounter of 01/21/25 (from the past 24 hours)   Glucose by meter   Result Value Ref Range    GLUCOSE BY METER POCT 222 (H) 70 - 99 mg/dL   Blood gas arterial   Result Value Ref Range    pH Arterial 7.28 (L) 7.35 - 7.45    pCO2 Arterial 44 35 - 45 mm Hg    pO2 Arterial 114 (H) 80 - 105 mm Hg    FIO2 30     Bicarbonate Arterial 21 21 - 28 mmol/L    Base Excess/Deficit Arterial -5.8 (L) -3.0 - 3.0 mmol/L    Viraj's Test Artline     Oxyhemoglobin Arterial 96 92 - 100 %    O2 Sat, Arterial 98.2 (H) 96.0 - 97.0 %    Narrative    In healthy individuals, oxyhemoglobin (O2Hb) and oxygen saturation (SO2) are approximately equal. In the presence of dyshemoglobins, oxyhemoglobin can be considerably lower than oxygen saturation.   Blood gas arterial   Result Value Ref Range    pH Arterial  7.29 (L) 7.35 - 7.45    pCO2 Arterial 45 35 - 45 mm Hg    pO2 Arterial 117 (H) 80 - 105 mm Hg    FIO2 30     Bicarbonate Arterial 21 21 - 28 mmol/L    Base Excess/Deficit Arterial -5.0 (L) -3.0 - 3.0 mmol/L    Viraj's Test Artline     Oxyhemoglobin Arterial 97 92 - 100 %    O2 Sat, Arterial 98.9 (H) 96.0 - 97.0 %    Narrative    In healthy individuals, oxyhemoglobin (O2Hb) and oxygen saturation (SO2) are approximately equal. In the presence of dyshemoglobins, oxyhemoglobin can be considerably lower than oxygen saturation.   CBC with platelets   Result Value Ref Range    WBC Count 16.9 (H) 4.0 - 11.0 10e3/uL    RBC Count 3.06 (L) 4.40 - 5.90 10e6/uL    Hemoglobin 9.5 (L) 13.3 - 17.7 g/dL    Hematocrit 29.3 (L) 40.0 - 53.0 %    MCV 96 78 - 100 fL    MCH 31.0 26.5 - 33.0 pg    MCHC 32.4 31.5 - 36.5 g/dL    RDW 18.2 (H) 10.0 - 15.0 %    Platelet Count 331 150 - 450 10e3/uL   Glucose by meter   Result Value Ref Range    GLUCOSE BY METER POCT 251 (H) 70 - 99 mg/dL   Blood gas arterial   Result Value Ref Range    pH Arterial 7.23 (L) 7.35 - 7.45    pCO2 Arterial 50 (H) 35 - 45 mm Hg    pO2 Arterial 118 (H) 80 - 105 mm Hg    FIO2 30     Bicarbonate Arterial 21 21 - 28 mmol/L    Base Excess/Deficit Arterial -6.4 (L) -3.0 - 3.0 mmol/L    Viraj's Test Artline     Oxyhemoglobin Arterial 97 92 - 100 %    O2 Sat, Arterial 98.7 (H) 96.0 - 97.0 %    Narrative    In healthy individuals, oxyhemoglobin (O2Hb) and oxygen saturation (SO2) are approximately equal. In the presence of dyshemoglobins, oxyhemoglobin can be considerably lower than oxygen saturation.   Glucose by meter   Result Value Ref Range    GLUCOSE BY METER POCT 253 (H) 70 - 99 mg/dL   Blood gas arterial   Result Value Ref Range    pH Arterial 7.26 (L) 7.35 - 7.45    pCO2 Arterial 45 35 - 45 mm Hg    pO2 Arterial 121 (H) 80 - 105 mm Hg    FIO2 30     Bicarbonate Arterial 20 (L) 21 - 28 mmol/L    Base Excess/Deficit Arterial -6.6 (L) -3.0 - 3.0 mmol/L    Viraj's Test  Artline     Oxyhemoglobin Arterial 98 92 - 100 %    O2 Sat, Arterial 99.2 (H) 96.0 - 97.0 %    Peep 5 cm H2O    Narrative    In healthy individuals, oxyhemoglobin (O2Hb) and oxygen saturation (SO2) are approximately equal. In the presence of dyshemoglobins, oxyhemoglobin can be considerably lower than oxygen saturation.   Blood gas arterial   Result Value Ref Range    pH Arterial 7.27 (L) 7.35 - 7.45    pCO2 Arterial 42 35 - 45 mm Hg    pO2 Arterial 109 (H) 80 - 105 mm Hg    FIO2 30     Bicarbonate Arterial 19 (L) 21 - 28 mmol/L    Base Excess/Deficit Arterial -7.4 (L) -3.0 - 3.0 mmol/L    Viraj's Test Artline     Oxyhemoglobin Arterial 96 92 - 100 %    O2 Sat, Arterial 98.1 (H) 96.0 - 97.0 %    Peep 5 cm H2O    Narrative    In healthy individuals, oxyhemoglobin (O2Hb) and oxygen saturation (SO2) are approximately equal. In the presence of dyshemoglobins, oxyhemoglobin can be considerably lower than oxygen saturation.   Heparin Unfractionated Anti Xa Level   Result Value Ref Range    Anti Xa Unfractionated Heparin <0.10 For Reference Range, See Comment IU/mL    Narrative    Therapeutic Range: UFH: 0.25-0.50 IU/mL for low intensity dosing,  0.30-0.70 IU/mL for high intensity dosing DVT and PE.  This test is not validated for other direct factor X inhibitors (e.g. rivaroxaban, apixaban, edoxaban, betrixaban, fondaparinux) and should not be used for monitoring of other medications.   Ketone Beta-Hydroxybutyrate Quantitative   Result Value Ref Range    Ketone (Beta-Hydroxybutyrate) Quantitative <0.18 <=0.30 mmol/L   Glucose by meter   Result Value Ref Range    GLUCOSE BY METER POCT 289 (H) 70 - 99 mg/dL   Blood gas arterial   Result Value Ref Range    pH Arterial 7.29 (L) 7.35 - 7.45    pCO2 Arterial 44 35 - 45 mm Hg    pO2 Arterial 117 (H) 80 - 105 mm Hg    FIO2 30     Bicarbonate Arterial 21 21 - 28 mmol/L    Base Excess/Deficit Arterial -5.1 (L) -3.0 - 3.0 mmol/L    Viraj's Test Artline     Oxyhemoglobin Arterial 97  92 - 100 %    O2 Sat, Arterial 98.8 (H) 96.0 - 97.0 %    Peep 5 cm H2O    Narrative    In healthy individuals, oxyhemoglobin (O2Hb) and oxygen saturation (SO2) are approximately equal. In the presence of dyshemoglobins, oxyhemoglobin can be considerably lower than oxygen saturation.   Heparin Unfractionated Anti Xa Level   Result Value Ref Range    Anti Xa Unfractionated Heparin <0.10 For Reference Range, See Comment IU/mL    Narrative    Therapeutic Range: UFH: 0.25-0.50 IU/mL for low intensity dosing,  0.30-0.70 IU/mL for high intensity dosing DVT and PE.  This test is not validated for other direct factor X inhibitors (e.g. rivaroxaban, apixaban, edoxaban, betrixaban, fondaparinux) and should not be used for monitoring of other medications.   Basic metabolic panel   Result Value Ref Range    Sodium 133 (L) 135 - 145 mmol/L    Potassium 4.4 3.4 - 5.3 mmol/L    Chloride 95 (L) 98 - 107 mmol/L    Carbon Dioxide (CO2) 20 (L) 22 - 29 mmol/L    Anion Gap 18 (H) 7 - 15 mmol/L    Urea Nitrogen 56.7 (H) 8.0 - 23.0 mg/dL    Creatinine 7.69 (H) 0.67 - 1.17 mg/dL    GFR Estimate 7 (L) >60 mL/min/1.73m2    Calcium 9.6 8.8 - 10.4 mg/dL    Glucose 326 (H) 70 - 99 mg/dL   CBC with platelets   Result Value Ref Range    WBC Count 14.1 (H) 4.0 - 11.0 10e3/uL    RBC Count 2.74 (L) 4.40 - 5.90 10e6/uL    Hemoglobin 8.4 (L) 13.3 - 17.7 g/dL    Hematocrit 26.4 (L) 40.0 - 53.0 %    MCV 96 78 - 100 fL    MCH 30.7 26.5 - 33.0 pg    MCHC 31.8 31.5 - 36.5 g/dL    RDW 18.3 (H) 10.0 - 15.0 %    Platelet Count 281 150 - 450 10e3/uL   Magnesium   Result Value Ref Range    Magnesium 2.0 1.7 - 2.3 mg/dL   Phosphorus   Result Value Ref Range    Phosphorus 7.9 (H) 2.5 - 4.5 mg/dL   Glucose by meter   Result Value Ref Range    GLUCOSE BY METER POCT 301 (H) 70 - 99 mg/dL   XR Chest Port 1 View    Narrative    EXAM: XR CHEST PORT 1 VIEW  LOCATION: [facility]  DATE: 1/29/2025    INDICATION: resp failure  COMPARISON:  1/27/2025.      Impression    IMPRESSION: ET tube, enteric tube, and right IJ central venous catheter are unchanged in position. The heart is enlarged, similar prior study. There is central pulmonary venous congestion with a moderate right-sided effusion which layers posteriorly.   Perihilar Interstitial edema is also noted   Glucose by meter   Result Value Ref Range    GLUCOSE BY METER POCT 242 (H) 70 - 99 mg/dL   Heparin Unfractionated Anti Xa Level   Result Value Ref Range    Anti Xa Unfractionated Heparin 0.16 For Reference Range, See Comment IU/mL    Narrative    Therapeutic Range: UFH: 0.25-0.50 IU/mL for low intensity dosing,  0.30-0.70 IU/mL for high intensity dosing DVT and PE.  This test is not validated for other direct factor X inhibitors (e.g. rivaroxaban, apixaban, edoxaban, betrixaban, fondaparinux) and should not be used for monitoring of other medications.   Blood gas arterial   Result Value Ref Range    pH Arterial 7.28 (L) 7.35 - 7.45    pCO2 Arterial 50 (H) 35 - 45 mm Hg    pO2 Arterial 130 (H) 80 - 105 mm Hg    FIO2 30     Bicarbonate Arterial 23 21 - 28 mmol/L    Base Excess/Deficit Arterial -3.5 (L) -3.0 - 3.0 mmol/L    Viraj's Test Artline     Oxyhemoglobin Arterial 97 92 - 100 %    O2 Sat, Arterial 99.1 (H) 96.0 - 97.0 %    Narrative    In healthy individuals, oxyhemoglobin (O2Hb) and oxygen saturation (SO2) are approximately equal. In the presence of dyshemoglobins, oxyhemoglobin can be considerably lower than oxygen saturation.         Medical Decision Making       Please see A&P for additional details of medical decision making.  MANAGEMENT DISCUSSED with the following over the past 24 hours: ICU Fellow   NOTE(S)/MEDICAL RECORDS REVIEWED over the past 24 hours: Medicine, Vascular Surgery, ICU,   Tests REVIEWED in the past 24 hours:  - See lab/imaging results included in the data section of the note  SUPPLEMENTAL HISTORY, in addition to the patient's history, over the past 24 hours  obtained from:   - son and two daughters; dtr in law

## 2025-01-29 NOTE — PLAN OF CARE
"Neuro: Intact.      CV: SR/ST, 90-100s. Epi infusing.  BLE doppler pulses, R post-tib absent.     Resp: Lung sounds L coarse, R coarse, crackles. FiO2 30%. PS 1120 to 2230.     GI: Bowel sounds normoactive, last BM 1/28. TF increased @ 0400 to 20mL/hr; 30mL flush q4.  Next increase to 30mL/hr @ 1600 1/29. Goal 40mL/hr.     : Voids via bedside urinal, oliguric UOP.     Skin: R groin & RLE surgical sites. R heel ulcer. R 2nd toe dusky; some on 3rd/4th toes.  PD site dressing change due in AM when disconnecting per pt routine.     Activity: Ax2 w/ lift.     Other: Blood sugars elevated, 200/300s. Plan to reassess extubation in AM; FCC @ 1000 1/29. Heparin infusing.     Problem: Surgery Nonspecified  Goal: Blood Glucose Level Within Targeted Range  Outcome: Not Progressing  Intervention: Optimize Glycemic Control  Recent Flowsheet Documentation  Taken 1/29/2025 0400 by Matias Castano RN  Hyperglycemia Management:   blood glucose monitored   correctional insulin given  Taken 1/29/2025 0000 by Matias Castano RN  Hyperglycemia Management:   blood glucose monitored   correctional insulin given  Taken 1/28/2025 2000 by Matias Castano RN  Hyperglycemia Management:   blood glucose monitored   correctional insulin given     Problem: Adult Inpatient Plan of Care  Goal: Plan of Care Review  Description: The Plan of Care Review/Shift note should be completed every shift.  The Outcome Evaluation is a brief statement about your assessment that the patient is improving, declining, or no change.  This information will be displayed automatically on your shift  note.  Outcome: Progressing  Goal: Patient-Specific Goal (Individualized)  Description: You can add care plan individualizations to a care plan. Examples of Individualization might be:  \"Parent requests to be called daily at 9am for status\", \"I have a hard time hearing out of my right ear\", or \"Do not touch me to wake me up as it startles  me\".  Outcome: " Progressing  Goal: Absence of Hospital-Acquired Illness or Injury  Outcome: Progressing  Intervention: Identify and Manage Fall Risk  Recent Flowsheet Documentation  Taken 1/29/2025 0400 by Matias Castano RN  Safety Promotion/Fall Prevention:   activity supervised   treat reversible contributory factors   treat underlying cause   supervised activity   room near nurse's station   room door open   patient and family education   nonskid shoes/slippers when out of bed   lighting adjusted   increase visualization of patient   increased rounding and observation   clutter free environment maintained   assistive device/personal items within reach  Taken 1/29/2025 0000 by Matias Castano RN  Safety Promotion/Fall Prevention:   activity supervised   treat reversible contributory factors   treat underlying cause   supervised activity   room near nurse's station   room door open   patient and family education   nonskid shoes/slippers when out of bed   lighting adjusted   increase visualization of patient   increased rounding and observation   clutter free environment maintained   assistive device/personal items within reach  Taken 1/28/2025 2000 by Matias Castano RN  Safety Promotion/Fall Prevention:   activity supervised   treat reversible contributory factors   treat underlying cause   supervised activity   room near nurse's station   room door open   patient and family education   nonskid shoes/slippers when out of bed   lighting adjusted   increase visualization of patient   increased rounding and observation   clutter free environment maintained   assistive device/personal items within reach  Intervention: Prevent Skin Injury  Recent Flowsheet Documentation  Taken 1/29/2025 0600 by Matias Castano RN  Body Position:   turned   heels elevated   weight shifting   position changed independently  Taken 1/29/2025 0400 by Matias Castano RN  Body Position:   turned   heels elevated   weight shifting   position  changed independently  Skin Protection:   adhesive use limited   tubing/devices free from skin contact   skin to device areas padded   silicone foam dressing in place   pulse oximeter probe site changed   incontinence pads utilized   skin to skin areas padded   transparent dressing maintained  Taken 1/29/2025 0200 by Matias Castano RN  Body Position:   turned   heels elevated   weight shifting   position changed independently  Taken 1/29/2025 0000 by Matias Castano RN  Body Position:   turned   heels elevated   weight shifting   position changed independently  Skin Protection:   adhesive use limited   tubing/devices free from skin contact   skin to device areas padded   silicone foam dressing in place   pulse oximeter probe site changed   incontinence pads utilized   skin to skin areas padded   transparent dressing maintained  Taken 1/28/2025 2200 by Matias Castano RN  Body Position:   turned   heels elevated   weight shifting   position changed independently  Taken 1/28/2025 2000 by Matias Castano RN  Body Position:   turned   heels elevated   weight shifting   position changed independently  Skin Protection:   adhesive use limited   tubing/devices free from skin contact   skin to device areas padded   silicone foam dressing in place   pulse oximeter probe site changed   incontinence pads utilized   skin to skin areas padded   transparent dressing maintained  Intervention: Prevent and Manage VTE (Venous Thromboembolism) Risk  Recent Flowsheet Documentation  Taken 1/29/2025 0400 by Matias Castano RN  VTE Prevention/Management: SCDs on (sequential compression devices)  Taken 1/29/2025 0000 by Matias Castano RN  VTE Prevention/Management: SCDs on (sequential compression devices)  Taken 1/28/2025 2000 by Matias Castano RN  VTE Prevention/Management: SCDs on (sequential compression devices)  Intervention: Prevent Infection  Recent Flowsheet Documentation  Taken 1/29/2025 0400 by Matias Castano  RN  Infection Prevention:   single patient room provided   rest/sleep promoted   hand hygiene promoted   equipment surfaces disinfected   personal protective equipment utilized  Taken 1/29/2025 0000 by Matias Castano RN  Infection Prevention:   single patient room provided   rest/sleep promoted   hand hygiene promoted   equipment surfaces disinfected   personal protective equipment utilized  Taken 1/28/2025 2000 by Matias Castano RN  Infection Prevention:   single patient room provided   rest/sleep promoted   hand hygiene promoted   equipment surfaces disinfected   personal protective equipment utilized  Goal: Optimal Comfort and Wellbeing  Outcome: Progressing  Intervention: Monitor Pain and Promote Comfort  Recent Flowsheet Documentation  Taken 1/29/2025 0400 by Matias Castano RN  Pain Management Interventions:   repositioned   rest   medication (see MAR)  Taken 1/29/2025 0000 by Matias Castano RN  Pain Management Interventions:   repositioned   rest  Taken 1/28/2025 2000 by Matias Castano RN  Pain Management Interventions:   medication (see MAR)   repositioned   rest  Intervention: Provide Person-Centered Care  Recent Flowsheet Documentation  Taken 1/29/2025 0400 by Matias Castano RN  Trust Relationship/Rapport:   care explained   thoughts/feelings acknowledged   reassurance provided   emotional support provided   choices provided   questions answered  Taken 1/29/2025 0000 by Matias Castano RN  Trust Relationship/Rapport:   care explained   thoughts/feelings acknowledged   reassurance provided   emotional support provided   choices provided   questions answered  Taken 1/28/2025 2000 by Matias Castano RN  Trust Relationship/Rapport:   care explained   thoughts/feelings acknowledged   reassurance provided   emotional support provided   choices provided   questions answered  Goal: Readiness for Transition of Care  Outcome: Progressing     Problem: Surgery Nonspecified  Goal: Absence of  Bleeding  Outcome: Progressing  Goal: Effective Bowel Elimination  Outcome: Progressing  Intervention: Enhance Bowel Motility and Elimination  Recent Flowsheet Documentation  Taken 1/29/2025 0400 by Matias Castano RN  Bowel Elimination Management: hygiene measures promoted  Goal: Fluid and Electrolyte Balance  Outcome: Progressing  Intervention: Monitor and Manage Fluid and Electrolyte Balance  Recent Flowsheet Documentation  Taken 1/29/2025 0400 by Matias Castano RN  Fluid/Electrolyte Management: fluids provided  Taken 1/29/2025 0000 by Matias Castano RN  Fluid/Electrolyte Management: fluids provided  Taken 1/28/2025 2000 by Matias Castano RN  Fluid/Electrolyte Management: fluids provided  Goal: Absence of Infection Signs and Symptoms  Outcome: Progressing  Intervention: Prevent or Manage Infection  Recent Flowsheet Documentation  Taken 1/29/2025 0400 by Matias Castano RN  Infection Management: aseptic technique maintained  Isolation Precautions: contact precautions maintained  Taken 1/29/2025 0000 by Matias Castano RN  Infection Management: aseptic technique maintained  Isolation Precautions: contact precautions maintained  Taken 1/28/2025 2000 by Matias Castano RN  Infection Management: aseptic technique maintained  Isolation Precautions: contact precautions maintained  Goal: Optimal Pain Control and Function  Outcome: Progressing  Intervention: Prevent or Manage Pain  Recent Flowsheet Documentation  Taken 1/29/2025 0400 by Matias Castano RN  Pain Management Interventions:   repositioned   rest   medication (see MAR)  Taken 1/29/2025 0000 by Matias Castano RN  Pain Management Interventions:   repositioned   rest  Taken 1/28/2025 2000 by Matias Castano RN  Pain Management Interventions:   medication (see MAR)   repositioned   rest  Goal: Effective Oxygenation and Ventilation  Outcome: Progressing  Intervention: Optimize Oxygenation and Ventilation  Recent Flowsheet  Documentation  Taken 1/29/2025 0600 by Matias Castano RN  Head of Bed (HOB) Positioning: HOB at 30 degrees  Taken 1/29/2025 0400 by Matias Castano RN  Cough And Deep Breathing: unable to perform  Airway/Ventilation Management:   airway patency maintained   pulmonary hygiene promoted   calming measures promoted   oxygen therapy provided   position adjusted  Activity Management:   activity adjusted per tolerance   activity minimized  Head of Bed (HOB) Positioning: HOB at 30 degrees  Taken 1/29/2025 0200 by Matias Castano RN  Head of Bed (HOB) Positioning: HOB at 30 degrees  Taken 1/29/2025 0000 by Matias Castano RN  Cough And Deep Breathing: unable to perform  Airway/Ventilation Management:   airway patency maintained   pulmonary hygiene promoted   calming measures promoted   oxygen therapy provided   position adjusted  Activity Management:   activity adjusted per tolerance   activity minimized  Head of Bed (HOB) Positioning: HOB at 30 degrees  Taken 1/28/2025 2200 by Matias Castano RN  Head of Bed (HOB) Positioning: HOB at 30 degrees  Taken 1/28/2025 2000 by Matias Castano RN  Cough And Deep Breathing: unable to perform  Airway/Ventilation Management:   airway patency maintained   pulmonary hygiene promoted   calming measures promoted   oxygen therapy provided   position adjusted  Activity Management:   activity adjusted per tolerance   activity minimized  Head of Bed (HOB) Positioning: HOB at 30 degrees     Problem: Restraint, Nonviolent  Goal: Absence of Harm or Injury  Outcome: Progressing  Intervention: Implement Least Restrictive Safety Strategies  Recent Flowsheet Documentation  Taken 1/29/2025 0400 by Matias Castano RN  Medical Device Protection:   tubing secured   torso covered  Taken 1/29/2025 0000 by Matias Castano RN  Medical Device Protection:   tubing secured   torso covered  Taken 1/28/2025 2000 by Matias Castano RN  Medical Device Protection:   tubing secured   torso  covered  Intervention: Protect Dignity, Rights and Personal Wellbeing  Recent Flowsheet Documentation  Taken 1/29/2025 0400 by Matias Castano RN  Trust Relationship/Rapport:   care explained   thoughts/feelings acknowledged   reassurance provided   emotional support provided   choices provided   questions answered  Taken 1/29/2025 0000 by Matias Castano RN  Trust Relationship/Rapport:   care explained   thoughts/feelings acknowledged   reassurance provided   emotional support provided   choices provided   questions answered  Taken 1/28/2025 2000 by Matias Castano RN  Trust Relationship/Rapport:   care explained   thoughts/feelings acknowledged   reassurance provided   emotional support provided   choices provided   questions answered  Intervention: Protect Skin and Joint Integrity  Recent Flowsheet Documentation  Taken 1/29/2025 0600 by Matias Castano RN  Body Position:   turned   heels elevated   weight shifting   position changed independently  Taken 1/29/2025 0400 by Matias Castano RN  Body Position:   turned   heels elevated   weight shifting   position changed independently  Skin Protection:   adhesive use limited   tubing/devices free from skin contact   skin to device areas padded   silicone foam dressing in place   pulse oximeter probe site changed   incontinence pads utilized   skin to skin areas padded   transparent dressing maintained  Taken 1/29/2025 0200 by Matias Castano RN  Body Position:   turned   heels elevated   weight shifting   position changed independently  Taken 1/29/2025 0000 by Matias Castano RN  Body Position:   turned   heels elevated   weight shifting   position changed independently  Skin Protection:   adhesive use limited   tubing/devices free from skin contact   skin to device areas padded   silicone foam dressing in place   pulse oximeter probe site changed   incontinence pads utilized   skin to skin areas padded   transparent dressing maintained  Taken 1/28/2025  2200 by Matias Castano RN  Body Position:   turned   heels elevated   weight shifting   position changed independently  Taken 1/28/2025 2000 by Matias Castano RN  Body Position:   turned   heels elevated   weight shifting   position changed independently  Skin Protection:   adhesive use limited   tubing/devices free from skin contact   skin to device areas padded   silicone foam dressing in place   pulse oximeter probe site changed   incontinence pads utilized   skin to skin areas padded   transparent dressing maintained     Problem: Risk for Delirium  Goal: Optimal Coping  Outcome: Progressing  Intervention: Optimize Psychosocial Adjustment to Delirium  Recent Flowsheet Documentation  Taken 1/29/2025 0400 by Matias Castano RN  Supportive Measures:   active listening utilized   positive reinforcement provided   relaxation techniques promoted   self-care encouraged  Taken 1/29/2025 0000 by Matias Castano RN  Supportive Measures:   active listening utilized   positive reinforcement provided   relaxation techniques promoted   self-care encouraged  Taken 1/28/2025 2000 by Matias Castano RN  Supportive Measures:   active listening utilized   positive reinforcement provided   relaxation techniques promoted   self-care encouraged  Goal: Improved Behavioral Control  Outcome: Progressing  Intervention: Minimize Safety Risk  Recent Flowsheet Documentation  Taken 1/29/2025 0400 by Matias Castano RN  Enhanced Safety Measures:   room near unit station   review medications for side effects with activity   patient/family teach back on injury risk   pain management   monitor patients coagulation values   assistive devices when indicated  Trust Relationship/Rapport:   care explained   thoughts/feelings acknowledged   reassurance provided   emotional support provided   choices provided   questions answered  Taken 1/29/2025 0000 by Matias Castano RN  Enhanced Safety Measures:   room near unit station   review  medications for side effects with activity   patient/family teach back on injury risk   pain management   monitor patients coagulation values   assistive devices when indicated  Trust Relationship/Rapport:   care explained   thoughts/feelings acknowledged   reassurance provided   emotional support provided   choices provided   questions answered  Taken 1/28/2025 2000 by Matias Castano RN  Enhanced Safety Measures:   room near unit station   review medications for side effects with activity   patient/family teach back on injury risk   pain management   monitor patients coagulation values   assistive devices when indicated  Trust Relationship/Rapport:   care explained   thoughts/feelings acknowledged   reassurance provided   emotional support provided   choices provided   questions answered  Goal: Improved Attention and Thought Clarity  Outcome: Progressing  Intervention: Maximize Cognitive Function  Recent Flowsheet Documentation  Taken 1/29/2025 0400 by Matias Castano RN  Sensory Stimulation Regulation:   care clustered   lighting decreased   quiet environment promoted  Reorientation Measures:   calendar in view   clock in view   reorientation provided  Taken 1/29/2025 0000 by Matias Castano RN  Sensory Stimulation Regulation:   care clustered   lighting decreased   quiet environment promoted  Reorientation Measures:   calendar in view   clock in view   reorientation provided  Taken 1/28/2025 2000 by Matias Castano RN  Sensory Stimulation Regulation:   care clustered   lighting decreased   quiet environment promoted  Reorientation Measures:   calendar in view   clock in view   reorientation provided  Goal: Improved Sleep  Outcome: Progressing  Intervention: Promote Sleep  Recent Flowsheet Documentation  Taken 1/29/2025 0400 by Matias Castano RN  Sleep/Rest Enhancement:   awakenings minimized   noise level reduced   regular sleep/rest pattern promoted   relaxation techniques promoted   room  darkened  Taken 1/29/2025 0000 by Matias Castano RN  Sleep/Rest Enhancement:   awakenings minimized   noise level reduced   regular sleep/rest pattern promoted   relaxation techniques promoted   room darkened  Taken 1/28/2025 2000 by Matias Castano RN  Sleep/Rest Enhancement:   awakenings minimized   noise level reduced   regular sleep/rest pattern promoted   relaxation techniques promoted   room darkened     Problem: Surgery Nonspecified  Goal: Anesthesia/Sedation Recovery  Outcome: Met  Intervention: Optimize Anesthesia Recovery  Recent Flowsheet Documentation  Taken 1/29/2025 0400 by Matias Castano RN  Safety Promotion/Fall Prevention:   activity supervised   treat reversible contributory factors   treat underlying cause   supervised activity   room near nurse's station   room door open   patient and family education   nonskid shoes/slippers when out of bed   lighting adjusted   increase visualization of patient   increased rounding and observation   clutter free environment maintained   assistive device/personal items within reach  Reorientation Measures:   calendar in view   clock in view   reorientation provided  Taken 1/29/2025 0000 by Matias Castano RN  Safety Promotion/Fall Prevention:   activity supervised   treat reversible contributory factors   treat underlying cause   supervised activity   room near nurse's station   room door open   patient and family education   nonskid shoes/slippers when out of bed   lighting adjusted   increase visualization of patient   increased rounding and observation   clutter free environment maintained   assistive device/personal items within reach  Reorientation Measures:   calendar in view   clock in view   reorientation provided  Taken 1/28/2025 2000 by Matias Castano RN  Safety Promotion/Fall Prevention:   activity supervised   treat reversible contributory factors   treat underlying cause   supervised activity   room near nurse's station   room door  open   patient and family education   nonskid shoes/slippers when out of bed   lighting adjusted   increase visualization of patient   increased rounding and observation   clutter free environment maintained   assistive device/personal items within reach  Reorientation Measures:   calendar in view   clock in view   reorientation provided  Goal: Nausea and Vomiting Relief  Outcome: Met  Goal: Effective Urinary Elimination  Outcome: Met  Intervention: Monitor and Manage Urinary Retention  Recent Flowsheet Documentation  Taken 1/29/2025 0400 by Matias Castano, RN  Urinary Elimination Promotion: toileting device within reach  Taken 1/29/2025 0000 by Matias Castano, RN  Urinary Elimination Promotion: toileting device within reach  Taken 1/28/2025 2000 by Matias Castano, RN  Urinary Elimination Promotion: toileting device within reach

## 2025-01-29 NOTE — PROGRESS NOTES
Vascular progress note.     Doing ok. Still intubated.     On exam,  Incisions on the groin and leg are both without signs of infection. Intact.   Improved monophasic AT signal.   Foot in dressing.     Came down on pressor requirement.     POD 2 from right fem-bk pop bypass with PTFE and proximal short GSV interposition graft.     Continue heparin drip.   Work towards extubation.   Appreciate care from icu.   Appreciate podiatry s help.

## 2025-01-29 NOTE — PROGRESS NOTES
Renal Medicine Progress Note            Assessment/Plan:     Arnulfo Pritchett is a 64 year old male CAD, HTN, HLD, pafib, HFrEF (EF 20-25%), ESRD on PD, DM2, TALI, RCC s/p R nephrectomy (2022), PAD with multiple LE procedures      1 End stage renal disease on PD  Chronic PD for last 3.5 years.  Home unit [facility], nephrologist Dr. May  Rx: 5 cycles, 2 L fill volumes, 9 hours, last fill 1.2 L with external.     2 PAD, right heel gangrene, occluded right SFA                              S/P : 1.   Right common femoral and below-knee popliteal/tibial endarterectomy                        2. Right common femoral to below-knee popliteal/tibial composite greater saphenous vein-6 mm   ringed PTFE Propaten bypas  Other issues-  Diabetes mellitus  Chronic paroxysmal atrial fibrillation  Secondary hyperparathyroidism and hyperphosphatemia  Severe heart failure with EF of 30% and mild RV dysfunction.     Plan:    # PD order placed. Will use 2.5% dextrose and one bag of 4.25 %  dextrose.           Interval History:     Afebrile.  VSS.   Patient is on CPAP trial.  He looks comfortable.  He is awake and following verbal commands.             Medications and Allergies:     Current Facility-Administered Medications   Medication Dose Route Frequency Provider Last Rate Last Admin    - MEDICATION INSTRUCTIONS for Dialysis Patients -   Does not apply See Admin Instructions Melissa Macias MD        acetaminophen (TYLENOL) solution 975 mg  975 mg Oral or NG Tube Q8H Milo Carrion MD   975 mg at 01/29/25 0330    [Held by provider] allopurinol (ZYLOPRIM) tablet 200 mg  200 mg Oral QPM Milo Carrion MD   200 mg at 01/26/25 1957    atorvastatin (LIPITOR) tablet 40 mg  40 mg Oral or Feeding Tube At Bedtime Melissa Macias MD   40 mg at 01/28/25 2030    B and C vitamin Complex with folic acid (NEPHRONEX) liquid 5 mL  5 mL Oral Daily Melissa Macias MD   5 mL at 01/29/25 0826    calcitRIOL  (ROCALTROL) solution 0.25 mcg  0.25 mcg Per NG tube At Bedtime Milo Carrion MD   0.25 mcg at 01/28/25 2033    chlorhexidine (PERIDEX) 0.12 % solution 15 mL  15 mL Mouth/Throat Q12H Patricia Dykes APRN CNP   15 mL at 01/29/25 0816    cinacalcet (SENSIPAR) tablet 60 mg  60 mg Oral Q Mon Wed Fri AM Milo Carrion MD   60 mg at 01/24/25 2151    clopidogrel (PLAVIX) tablet 75 mg  75 mg Oral or Feeding Tube QPM Melissa Macias MD   75 mg at 01/28/25 2030    insulin aspart (NovoLOG) injection (RAPID ACTING)  1-7 Units Subcutaneous Q4H Patricia Dykes APRN CNP   3 Units at 01/29/25 0826    insulin glargine (LANTUS PEN) injection 15 Units  15 Units Subcutaneous At Bedtime Milo Byrne MD        insulin glargine (LANTUS PEN) injection 7 Units  7 Units Subcutaneous Once Milo Byrne MD        [Held by provider] methocarbamol (ROBAXIN) tablet 750 mg  750 mg Oral or NG Tube 4x Daily Milo Carrion MD        [Held by provider] metoprolol succinate ER (TOPROL XL) 24 hr tablet 25 mg  25 mg Oral Daily Milo Carrion MD   25 mg at 01/27/25 0848    [Held by provider] multivitamin RENAL (TRIPHROCAPS) capsule 1 capsule  1 capsule Oral Daily Milo Carrion MD   1 capsule at 01/27/25 0848    pantoprazole (PROTONIX) 2 mg/mL suspension 40 mg  40 mg Per Feeding Tube QAM  Patricia Dykes APRN CNP   40 mg at 01/28/25 0834    Or    pantoprazole (PROTONIX) IV push injection 40 mg  40 mg Intravenous QACoxHealth Patricia Dykes APRN CNP   40 mg at 01/29/25 0816    piperacillin-tazobactam (ZOSYN) 2.25 g vial to attach to  ml bag  2.25 g Intravenous Q8H Patricia Dykes APRN CNP   2.25 g at 01/29/25 0330    polyethylene glycol (MIRALAX) Packet 17 g  17 g Oral or NG Tube Daily Milo Carrion MD   17 g at 01/29/25 0816    Prosource TF20 ENfit Compatibl EN LIQD (PROSOURCE TF20) packet 60 mL  1 packet Per Feeding Tube BID Junior Chowdhury MD   60 mL at 01/29/25 0826    senna-docusate  (SENOKOT-S/PERICOLACE) 8.6-50 MG per tablet 1 tablet  1 tablet Oral or NG Tube BID Milo Carrion MD   1 tablet at 01/29/25 0817    sevelamer carbonate (RENVELA) Packet 0.8 g  0.8 g Oral or Feeding Tube TID w/meals AcunaStar MD   0.8 g at 01/29/25 0826    sodium chloride (PF) 0.9% PF flush 3 mL  3 mL Intracatheter Q8H Milo Carrion MD   3 mL at 01/29/25 0400      No Known Allergies         Physical Exam:   Vitals were reviewed   , Blood pressure 98/67, pulse 96, temperature 98  F (36.7  C), temperature source Axillary, resp. rate 12, weight 78.5 kg (173 lb 1 oz), SpO2 98%.    Wt Readings from Last 3 Encounters:   01/29/25 78.5 kg (173 lb 1 oz)   01/21/25 75.8 kg (167 lb)   01/03/25 78.9 kg (174 lb)       Intake/Output Summary (Last 24 hours) at 1/29/2025 1015  Last data filed at 1/29/2025 1000  Gross per 24 hour   Intake 2419.08 ml   Output --   Net 2419.08 ml       GENERAL APPEARANCE: NAD  HEENT:  Eyes/ears/nose/neck grossly normal. Intubated  RESP: lungs cta b c good efforts, no crackles, rhonchi or wheezes  CV: RRR  ABDOMEN: soft, NT  EXTREMITIES/SKIN: No edema  NEURO: Awake, alert and following verbal commands         Data:     CBC RESULTS:     Recent Labs   Lab 01/29/25  0355 01/28/25  1347 01/28/25  0425 01/27/25  2226 01/27/25  1852 01/25/25  0735   WBC 14.1* 16.9* 16.7* 15.3* 14.4* 15.2*   RBC 2.74* 3.06* 2.99* 2.97* 2.75* 3.04*   HGB 8.4* 9.5* 9.2* 9.1* 8.6* 9.8*   HCT 26.4* 29.3* 28.3* 28.0* 26.1* 29.3*    331 308 314 330 405       Basic Metabolic Panel:  Recent Labs   Lab 01/29/25  0806 01/29/25  0357 01/29/25  0355 01/29/25  0018 01/28/25  2025 01/28/25  1645 01/28/25  0429 01/28/25  0425 01/27/25  2241 01/27/25  2230 01/27/25  2034 01/27/25  1852 01/27/25  1618 01/24/25  0838 01/24/25  0425   NA  --   --  133*  --   --   --   --  134*  --  135  --  135 133*  --  133*   POTASSIUM  --   --  4.4  --   --   --   --  5.2  --  4.8  --  4.3 4.5  --  4.5   CHLORIDE  --   --  95*  --   --    --   --  98  --  98  --  97* 95*  --  93*   CO2  --   --  20*  --   --   --   --  21*  --  20*  --  21* 24  --  26   BUN  --   --  56.7*  --   --   --   --  51.3*  --  48.6*  --  48.5* 48.7*  --  52.6*   CR  --   --  7.69*  --   --   --   --  7.65*  --  7.38*  --  7.24* 7.43*  --  7.52*   * 301* 326* 289* 253* 251*   < > 208*   < > 155*   < > 81 92   < > 257*   TERENCE  --   --  9.6  --   --   --   --  9.6  --  9.5  --  9.4 9.9  --  9.3    < > = values in this interval not displayed.       INR  Recent Labs   Lab 01/27/25  2230 01/23/25  1517 01/23/25  0449 01/22/25  2157   INR 1.77* 1.46* 1.65* 1.78*      Attestation:   I have reviewed today's relevant vital signs, notes, medications, labs and imaging.    Star Acuna MD  [facility]phrology  Office phone :[phone]  Pager: [phone]

## 2025-01-29 NOTE — PROGRESS NOTES
Critical Care  Note      01/29/2025    Name: Arnulfo Pritchett MRN#: [id number]   Age: 64 year old YOB: 1960     Hsptl Day# 8  ICU DAY #    MV DAY #             Problem List:   Active Problems:    Cellulitis           Summary/Hospital Course:   Arnulfo Pritchett is a 64 year old male with personal medical history of severe heart failure (EF 30%), ESRD (on peritoneal dialysis), chronic paroxysmal AF on warfarin, LLE PAD s/p endarterectomy, RCC s/p nephrectomy admitted on 1/21/2025 for septic shock secondary to right leg PAD with worsening right heel necrotic ulcer. The patient was seen in ED at outside hospital on 1/3/25 and diagnosed with pneumonia with completed course of Cefdinir. He then presented on 1/21/25 to outside hospital for right leg pain and found to have right leg ulcer and transferred to [facility] for vascular surgery evaluation. Now s/p right common femoral BK popliteal/tibial endarterectomy and right common femoral to below-knee popliteal/tibial PTFE bypass performed on 1/27/25. The patient was admitted to the ICU for requirement of mechanical ventilation and pressor support following surgery for multifactorial shock.    Interval/overnight events:   -No acute events overnight  -Significantly more alert.  Complaining of pain and has been receiving frequent opioids        Assessment and plan :     Arnulfo Pritchett IS a 64 year old male admitted on 1/21/2025 for septic shock secondary to right leg PAD with worsening right heel necrotic ulcer now s/p right popliteal endarterectomy with PTFE bypass on 1/27/25 requiring mechanical ventilation and pressure support for multifactorial shock following surgery.     I have personally reviewed the daily labs, imaging studies, cultures and discussed the case with referring physician and consulting physicians.     My assessment and plan by system for this patient is as follows:    Neurology/Psychiatry:   # Pain/analgesia with frequent opioid administration.   This is likely depressing his respiratory status.  # Sedation  # Delirium  Palliative following with goals of care conference completed 1/29  Will schedule Tylenol will reduce  oxycodone to 2.5 mg q4hr prn.  Consider placing feeding tube to administer pain medications if unable to take p.o. and lieu of administering IV    MSK:   # Hx PAD of LLE s/p endarterectomy and fem-tibioperoneal trunk bypass on 10/25/24  # Hx RLE arterial occlusion s/p SFA angioplasty and stent 5/2024  # PAD RLE, right heel gangrene, occluded SFA, no evidence of osteomyelitis  # s/p right common femoral and popliteal/tibial endarterectomy, right femoral to popliteal/tibial PTFE bypass 1/27/25  Vascular surgery following  Wound care following  Podiatry following    Cardiovascular:   # Chronic paroxysmal atrial fibrillation on warfarin PTA  # Ischemic cardiomyopathy with HLD, CAD s/p multiple stents, and severe EFrEF (30%)  # Multifactorial shock, most likely vasoplegic secondary to stress of surgery and sedation vs less likely hypovolemic as patient has minimal urine output at baseline with known ESRD, lactate now normalized and coming down on vasopressors  Maintain MAP goal >65  Continue heparin IV 1600 units/hr  Continue Plavix  Hold warfarin, reversed with vit K by vascular surgery on 1/22/25.  Defer to their judgment on resuming    Pulmonary/Ventilator Management:   # Mechanically ventilated for surgery during procedure on 1/27.  Remained intubated because of encephalopathy shock and hypoxia.  Today, 1/29, more awake and on minimal ventilator settings.  Still has respiratory acidosis but would be much better able to protect airway.  # Right pleural effusion with passive atelectasis - stable.  Does not appear to be contributing to any respiratory failure  # s/p thoracentesis 1/24/25 with 1.5 L clear yellow fluid removed, likely transudative based on low cell count of 117, , and protein of 1.7. Possibly secondary to reduced peritoneal  dialysis during hospitalization vs underlying severe HFrEF (30%)  # Mixed anion gap acidosis, opioids likely contributing to persistently low pH with elevated pCO2  Continue monitor for need of thoracentesis  Trial of extubation.  Reduce opioid doses.    GI and Nutrition :   # Moderate protein-calorie malnutrition  Discontinue TF with planned extubation  Start clear liquid diet with advancement as tolerated.  Would recommend feeding tube replacement if unable to tolerate p.o. if this is consistent with his goals of care.    Renal/Fluids/Electrolytes:  # ESRD on peritoneal dialysis, receiving daily PD.  Did not remove fluid overnight of 1/28 to 1/29  # Hx of RCC s/p R nephrectomy  # Anion gap metabolic acidosis.  Lactic acid now better.  # Mild Hyponatremia, 133  Daily BMP  Nephrology following  Continue daily PD  Continue Sensipar and sevelamer PTA  Plan for peritoneal dialysis this evening with fluid removal    Infectious Disease:   # R heel necrotic ulcer and gangrenous leg. S/p bypass of right femoral to popliteal artery 1/27/25  # MRSA positive nasal swab on 1/21/25, blood culture no growth to date  Continue Zosyn 2.2g IV q6h for 10 day course for foot wound - on day 6/10- complete 2/1/25    Endocrine:   # Hx of secondary hyperparathyroidism and hyperphosphatemia, phosphorous elevated above baseline of 6 at most recent of 7.9  # Hx type 2 DM, last A1c of 5.7 on 10/2024, beta hydroxybutyrate <0.1.  Has been on dialysis for quite some time.  Has had some lower blood sugars here  Medium dose insulin sliding scale  Hold Lantus until we are able to see if he can take p.o.    Hematology/Oncology:   # Anemia of chronic disease, Hgb of 8.4, no signs or symptoms of active bleeding, downtrend from 12.2 on 1/22/25  # Leukocytosis - WBC 14.1, likely secondary to known necrotic ulcer of R heel  Daily CBC  Given recent procedure, transfuse blood at Hgb < 8.  May change to less than 7     IV/Access:   1. Venous access - CVC R  internal jugular, 1x PIV  2. Arterial access - Right radial line  3. ETT 8 mm  4. Peritoneal dialysis catheter  5. OG Feeding tube - remove today 1/29/25 following extubation    ICU Prophylaxis:   1. DVT: IV Heparin  2. VAP: HOB 30 degrees, chlorhexidine rinse  3. Stress Ulcer: PPI/H2 blocker  4. Restraints: Nonviolent soft two point restraints required and necessary for patient safety and continued cares and good effect as patient continues to pull at necessary lines, tubes despite education and distraction. Will readdress daily.   5. Wound care  - WOC following  6. Feeding - TF advancing to goal of 40 ml/hr  7. Family Update: spoke with family outside patient's room  8. Disposition - ICU    Clinically Significant Risk Factors         # Hyponatremia: Lowest Na = 133 mmol/L in last 2 days, will monitor as appropriate  # Hypochloremia: Lowest Cl = 95 mmol/L in last 2 days, will monitor as appropriate   # Hypercalcemia: corrected calcium is >10.1, will monitor as appropriate  # Hypomagnesemia: Lowest Mg = 1.6 mg/dL in last 2 days, will replace as needed   # Hypoalbuminemia: Lowest albumin = 2.3 g/dL at 1/27/2025  6:52 PM, will monitor as appropriate  # Coagulation Defect: INR = 1.77 (Ref range: 0.85 - 1.15) and/or PTT = 42 Seconds (Ref range: 22 - 38 Seconds), will monitor for bleeding    # Hypertension: Noted on problem list             # Severe Malnutrition: based on nutrition assessment    # Financial/Environmental Concerns:                               Key Medications:     Current Facility-Administered Medications   Medication Dose Route Frequency Provider Last Rate Last Admin    - MEDICATION INSTRUCTIONS for Dialysis Patients -   Does not apply See Admin Instructions Melissa Macias MD        acetaminophen (TYLENOL) solution 975 mg  975 mg Oral or NG Tube Q8H Milo Carrion MD   975 mg at 01/29/25 0330    [Held by provider] allopurinol (ZYLOPRIM) tablet 200 mg  200 mg Oral QPM Milo Carrion MD    200 mg at 01/26/25 1957    atorvastatin (LIPITOR) tablet 40 mg  40 mg Oral or Feeding Tube At Bedtime Melissa Macias MD   40 mg at 01/28/25 2030    B and C vitamin Complex with folic acid (NEPHRONEX) liquid 5 mL  5 mL Oral Daily Melissa Macias MD        calcitRIOL (ROCALTROL) solution 0.25 mcg  0.25 mcg Per NG tube At Bedtime Milo Carrion MD   0.25 mcg at 01/28/25 2033    chlorhexidine (PERIDEX) 0.12 % solution 15 mL  15 mL Mouth/Throat Q12H Patricia Dykes APRN CNP   15 mL at 01/28/25 2030    cinacalcet (SENSIPAR) tablet 60 mg  60 mg Oral Q Mon Wed Fri AM Milo Carrion MD   60 mg at 01/24/25 2151    clopidogrel (PLAVIX) tablet 75 mg  75 mg Oral or Feeding Tube QPM Melissa Macias MD   75 mg at 01/28/25 2030    insulin aspart (NovoLOG) injection (RAPID ACTING)  1-7 Units Subcutaneous Q4H Patricia Dykes APRN CNP   4 Units at 01/29/25 0400    [Held by provider] insulin glargine (LANTUS PEN) injection 15 Units  15 Units Subcutaneous At Bedtime Milo Carrion MD   15 Units at 01/26/25 2208    [Held by provider] methocarbamol (ROBAXIN) tablet 750 mg  750 mg Oral or NG Tube 4x Daily Milo Carrion MD        [Held by provider] metoprolol succinate ER (TOPROL XL) 24 hr tablet 25 mg  25 mg Oral Daily Milo Carrion MD   25 mg at 01/27/25 0848    [Held by provider] multivitamin RENAL (TRIPHROCAPS) capsule 1 capsule  1 capsule Oral Daily Milo Carrion MD   1 capsule at 01/27/25 0848    pantoprazole (PROTONIX) 2 mg/mL suspension 40 mg  40 mg Per Feeding Tube QAM AC Patricia Dykes APRN CNP   40 mg at 01/28/25 0834    Or    pantoprazole (PROTONIX) IV push injection 40 mg  40 mg Intravenous QAM AC Patricia Dykes APRN CNP        piperacillin-tazobactam (ZOSYN) 2.25 g vial to attach to  ml bag  2.25 g Intravenous Q8H Patricia Dykes, APRN CNP   2.25 g at 01/29/25 0330    polyethylene glycol (MIRALAX) Packet 17 g  17 g Oral or NG Tube Daily Milo Carrion MD   17 g at  01/28/25 0834    Prosource TF20 ENfit Compatibl EN LIQD (PROSOURCE TF20) packet 60 mL  1 packet Per Feeding Tube BID Junior Chowdhury MD   60 mL at 01/28/25 2030    senna-docusate (SENOKOT-S/PERICOLACE) 8.6-50 MG per tablet 1 tablet  1 tablet Oral or NG Tube BID Milo Carrion MD   1 tablet at 01/28/25 2030    sevelamer carbonate (RENVELA) Packet 0.8 g  0.8 g Oral or Feeding Tube TID w/meals AcunaStar MD   0.8 g at 01/28/25 1802    sodium chloride (PF) 0.9% PF flush 3 mL  3 mL Intracatheter Q8H Milo Carrion MD   3 mL at 01/29/25 0400     Current Facility-Administered Medications   Medication Dose Route Frequency Provider Last Rate Last Admin    Continuing statin from home medication list OR statin order already placed during this visit   Does not apply DOES NOT GO TO MAR Milo Carrion MD        dextrose 10% infusion   Intravenous Continuous PRN Junior Chowdhury MD        dianeal PD LOW calcium-1.5% dex (calcium 2.5 mEq/L) 6,000 mL PERITONEAL DIALYSATE   Dialysis [facility] Supplied PD Milo Carrion MD        dianeal PD LOW calcium-2.5% dex (calcium 2.5 mEq/L) 6,000 mL PERITONEAL DIALYSATE   Dialysis [facility] Supplied PD Milo Carrion MD        EPINEPHrine (ADRENALIN) 5 mg in  mL infusion  0.01-0.3 mcg/kg/min Intravenous Continuous Patricia Dykes APRN CNP 2.3 mL/hr at 01/29/25 0622 0.01 mcg/kg/min at 01/29/25 0622    heparin 25,000 units in 0.45% NaCl 250 mL ANTICOAGULANT infusion  0-5,000 Units/hr Intravenous Continuous Milo Carrion MD 16 mL/hr at 01/29/25 0457 1,600 Units/hr at 01/29/25 0457    propofol (DIPRIVAN) infusion  5-75 mcg/kg/min Intravenous Continuous Patricia Dykes APRN CNP   Stopped at 01/28/25 1245    And    Medication Instruction   Does not apply Continuous PRN Korpi, Patricia K, APRN CNP        norepinephrine (LEVOPHED) 4 mg in  mL infusion PREMIX  0.01-0.6 mcg/kg/min Intravenous Continuous Patricia Dykes APRN CNP        Patient is already  receiving anticoagulation with heparin, enoxaparin (LOVENOX), warfarin (COUMADIN)  or other anticoagulant medication   Does not apply Continuous PRN Milo Carrion MD        sodium chloride 0.9 % infusion   Intravenous Continuous Melissa Macias MD 20 mL/hr at 01/28/25 1900 Rate Verify at 01/28/25 1900    vasopressin 0.2 units/mL in NS (PITRESSIN) standard conc infusion  2.4 Units/hr Intravenous Continuous Patricia Dykes APRN CNP   Stopped at 01/29/25 0314              Physical Examination:   Temp:  [98  F (36.7  C)-98.9  F (37.2  C)] 98  F (36.7  C)  Pulse:  [] 99  Resp:  [12-26] 13  BP: ()/(57-89) 105/71  MAP:  [52 mmHg-93 mmHg] 68 mmHg  Arterial Line BP: ()/(-14-75) 94/52  FiO2 (%):  [30 %-40 %] 30 %  SpO2:  [88 %-100 %] 98 %    Intake/Output Summary (Last 24 hours) at 1/29/2025 0727  Last data filed at 1/29/2025 0600  Gross per 24 hour   Intake 2664.81 ml   Output --   Net 2664.81 ml     Wt Readings from Last 4 Encounters:   01/29/25 78.5 kg (173 lb 1 oz)   01/21/25 75.8 kg (167 lb)   01/03/25 78.9 kg (174 lb)   01/02/25 78.9 kg (173 lb 14.4 oz)     Arterial Line BP: ()/(-14-75) 94/52  MAP:  [52 mmHg-93 mmHg] 68 mmHg  BP - Mean:  [] 83  FiO2 (%): 30 %, Resp: 13, Vent Mode: CMV/AC, Resp Rate (Set): 14 breaths/min, Tidal Volume (Set, mL): 450 mL, PEEP (cm H2O): 5 cmH2O, Pressure Support (cm H2O): 5 cmH2O, Resp Rate (Set): 14 breaths/min, Tidal Volume (Set, mL): 450 mL, PEEP (cm H2O): 5 cmH2O  Recent Labs   Lab 01/29/25  0209 01/28/25 2159 01/28/25 2026 01/28/25  1817   PH 7.29* 7.27* 7.26* 7.23*   PCO2 44 42 45 50*   PO2 117* 109* 121* 118*   HCO3 21 19* 20* 21   O2PER 30 30 30 30       GEN: no acute distress, resting comfortably,   HEENT: head ncat, sclera anicteric, OP patent, trachea midline   PULM: unlabored synchronous with vent, clear anteriorly, taking deeper breaths with larger tidal volumes  CV/COR: RRR S1S2 no gallop,  No rub, no murmur  ABD: soft,  non-tender, normoactive bowel sounds, no mass  EXT:  warm and well perfused x4, no edema, RLE covered with boot and wound dressing  NEURO: PERRL, no obvious deficits, squeezes hand, opens eyes, and wiggles toes on command  SKIN: no obvious rash  LINES: clean, dry intact         Data:   All data and imaging reviewed     ROUTINE ICU LABS (Last four results)  CMP  Recent Labs   Lab 01/29/25  0357 01/29/25  0355 01/29/25  0018 01/28/25 2025 01/28/25 0429 01/28/25 0425 01/27/25  2241 01/27/25  2230 01/27/25  2034 01/27/25  1852 01/24/25  1659 01/24/25  1612   NA  --  133*  --   --   --  134*  --  135  --  135   < >  --    POTASSIUM  --  4.4  --   --   --  5.2  --  4.8  --  4.3   < >  --    CHLORIDE  --  95*  --   --   --  98  --  98  --  97*   < >  --    CO2  --  20*  --   --   --  21*  --  20*  --  21*   < >  --    ANIONGAP  --  18*  --   --   --  15  --  17*  --  17*   < >  --    * 326* 289* 253*   < > 208*   < > 155*   < > 81   < >  --    BUN  --  56.7*  --   --   --  51.3*  --  48.6*  --  48.5*   < >  --    CR  --  7.69*  --   --   --  7.65*  --  7.38*  --  7.24*   < >  --    GFRESTIMATED  --  7*  --   --   --  7*  --  8*  --  8*   < >  --    TERENCE  --  9.6  --   --   --  9.6  --  9.5  --  9.4   < >  --    MAG  --  2.0  --   --   --  1.7  --  1.6*  --  1.6*  --   --    PHOS  --  7.9*  --   --   --  8.0*  --  6.5*  --  6.1*  --   --    PROTTOTAL  --   --   --   --   --   --   --   --   --  5.3*  --  5.7*   ALBUMIN  --   --   --   --   --   --   --   --   --  2.3*  --   --    BILITOTAL  --   --   --   --   --   --   --   --   --  0.3  --   --    ALKPHOS  --   --   --   --   --   --   --   --   --  101  --   --    AST  --   --   --   --   --   --   --   --   --  12  --   --    ALT  --   --   --   --   --   --   --   --   --  8  --   --     < > = values in this interval not displayed.     CBC  Recent Labs   Lab 01/29/25  0355 01/28/25  1347 01/28/25  0425 01/27/25  2226   WBC 14.1* 16.9* 16.7* 15.3*   RBC 2.74*  "3.06* 2.99* 2.97*   HGB 8.4* 9.5* 9.2* 9.1*   HCT 26.4* 29.3* 28.3* 28.0*   MCV 96 96 95 94   MCH 30.7 31.0 30.8 30.6   MCHC 31.8 32.4 32.5 32.5   RDW 18.3* 18.2* 18.3* 18.3*    331 308 314     INR  Recent Labs   Lab 01/27/25  2230 01/23/25  1517 01/23/25  0449 01/22/25  2157   INR 1.77* 1.46* 1.65* 1.78*     Arterial Blood Gas  Recent Labs   Lab 01/29/25  0209 01/28/25  2159 01/28/25 2026 01/28/25  1817   PH 7.29* 7.27* 7.26* 7.23*   PCO2 44 42 45 50*   PO2 117* 109* 121* 118*   HCO3 21 19* 20* 21   O2PER 30 30 30 30       All cultures:  No results for input(s): \"CULT\" in the last 168 hours.  Recent Results (from the past 24 hours)   XR Chest Port 1 View    Narrative    EXAM: XR CHEST PORT 1 VIEW  LOCATION: [facility]  DATE: 1/29/2025    INDICATION: resp failure  COMPARISON: 1/27/2025.      Impression    IMPRESSION: ET tube, enteric tube, and right IJ central venous catheter are unchanged in position. The heart is enlarged, similar prior study. There is central pulmonary venous congestion with a moderate right-sided effusion which layers posteriorly.   Perihilar Interstitial edema is also noted     Chio Mancera MS4        Physician Attestation   I, Enedina Osman MD, was present with the medical/WILLIAM student who participated in the service and in the documentation of the note.  I have verified the history and personally performed the physical exam and medical decision making.  I agree with the assessment and plan of care as documented in the note.      Key findings: Improved.  Meeting criteria for trial of extubation.  I have directly edited medical student note to reflect my assessment and plan.    Critical care time excluding procedure time is 40 minutes    Enedina Osman MD  Date of Service (when I saw the patient): 01/29/25       "

## 2025-01-29 NOTE — PROGRESS NOTES
VASCULAR SURGERY    Visit again with patient.  Is extubated.  Breathing comfortably.       Patrick Hein MD

## 2025-01-29 NOTE — PROGRESS NOTES
Cycler set up per protocol and per treatment orders      Results from previous treatment:     Drain volume: 2178  Initial drain: 63  Total UF: -91  Avg dwell: 1:17    Cycler Serial Number: [id number]  Total Treatment Volume: 81608 mL  Total treatment time: 9 hrs  Fill Volume: 2000 ml  Last Fill Volume:0 ml  Heater ba.5% 6000mL;  Lot #: [id number];  Expiration Date: 2026  Side Bag #1: 1.5% 6000mL;  Lot #: [id number];  Expiration Date: 2026     Number of cycles including final fill: 5     Last Fill Volume: 0 ml  Initial Drain Alarm: 0 ml  PD orders reviewed with Patient procedure and ESRD teaching done and questions answered.  Cycler ready for hook-up.    Report given to: Primary MARYSOL Daniel consent form signed YES

## 2025-01-29 NOTE — PROGRESS NOTES
[facility] RESPIRATORY NOTE           Date of Admission: 1/21/2025     Date of Intubation (most recent): 01/27/2025     Reason for Mechanical Ventilation: 3     Number of Days on Mechanical Ventilation: Airway protection.     Met Criteria for Spontaneous Breathing Trial: No     Reason for No Spontaneous Breathing Trial: per MD.     Bite Block: No     Significant Events Today: None.     ABG Results:         Recent Labs   Lab 01/27/25 2229 01/27/25 2037 01/27/25  1853   PH 7.35 7.40 7.42   PCO2 39 35 36   PO2 190* 132* 106*   HCO3 21 22 23   O2PER 60  --  50          Current Vent Settings: FiO2 (%): 60 %, Resp: 14, Vent Mode: CMV/AC, Resp Rate (Set): 14 breaths/min, Tidal Volume (Set, mL): 450 mL, PEEP (cm H2O): 6 cmH2O, Resp Rate (Set): 14 breaths/min, Tidal Volume (Set, mL): 450 mL, PEEP (cm H2O): 6 cmH2O     Skin Assessment: clean and intact.     Plan: PS today and possible extubate.    Danitza Middleton, RT  1/29/2025

## 2025-01-29 NOTE — DISCHARGE INSTRUCTIONS
"Wound Care    Right heel and lateral malleolus wound(s): Daily   \"Paint\" wounds with betadine and let dry. Cover with dry gauze/ABD and wrap with Kerlix. Secure with Medipore tape. Keep heel off-loaded at all times with Rooke boot.  "

## 2025-01-29 NOTE — PROGRESS NOTES
Patient extubated at 1319 to RA, SpO2 94%. Patient tolerated well, BBS clear, cuff leak present. Adhesive remover used to remove ETAD    Sb Auguste, RT on 1/29/2025 at 1:31 PM

## 2025-01-29 NOTE — PLAN OF CARE
"Goal Outcome Evaluation:      Plan of Care Reviewed With: patient    Overall Patient Progress: improvingOverall Patient Progress: improving     MAP goal achieved with use of epi, vasopressin. Per WILLIAM on unit, OK to reduce vaso rate to 1.2 u/hr and titrate using epi; see MAR for details. Remains vented, 30%/5. PST beginning this afternoon, pt currently tolerating well. Rested in bed this shift, A2 w/lift to turn/reposition. Sedation weaned this shift, pt tolerating well. Remains sleepy between cares, opens eyes spontaneously and follows commands; able to answer yes/no questions. Pt endorsed pain to R foot this afternoon, prn oxycodone given with pt reports of improvement. R foot wound changed by provider, Dionne carrillo applied. PD supplies set up in room by dialysis RN; plan to complete overnight. Small, hard BM this shift, BS+. TF initiated this shift with plans to advance by 10 ml Q12H, flushes and meds as scheduled. Heparin increased to 900 unit(s)/hr, plan for hepXa recheck on upcoming shift. Afib this AM, Tele SR currently. Bed alarms in use, pt not attempting to exit bed. Oriented to conversation, answers questions appropriately. Continue to monitor.       Problem: Adult Inpatient Plan of Care  Goal: Plan of Care Review  Description: The Plan of Care Review/Shift note should be completed every shift.  The Outcome Evaluation is a brief statement about your assessment that the patient is improving, declining, or no change.  This information will be displayed automatically on your shift  note.  Outcome: Progressing  Flowsheets (Taken 1/28/2025 1825)  Plan of Care Reviewed With: patient  Overall Patient Progress: improving  Goal: Patient-Specific Goal (Individualized)  Description: You can add care plan individualizations to a care plan. Examples of Individualization might be:  \"Parent requests to be called daily at 9am for status\", \"I have a hard time hearing out of my right ear\", or \"Do not touch me to wake me up as " "it startles  me\".  Outcome: Progressing  Goal: Absence of Hospital-Acquired Illness or Injury  Outcome: Progressing  Intervention: Identify and Manage Fall Risk  Recent Flowsheet Documentation  Taken 1/28/2025 1600 by Evelia Avalos RN  Safety Promotion/Fall Prevention:   activity supervised   treat reversible contributory factors   treat underlying cause   supervised activity   safety round/check completed   room near nurse's station   room door open   patient and family education   nonskid shoes/slippers when out of bed   mobility aid in reach   lighting adjusted   increase visualization of patient   increased rounding and observation   clutter free environment maintained   assistive device/personal items within reach  Taken 1/28/2025 1200 by Evelia Avalos RN  Safety Promotion/Fall Prevention:   activity supervised   treat reversible contributory factors   treat underlying cause   supervised activity   safety round/check completed   room near nurse's station   room door open   patient and family education   nonskid shoes/slippers when out of bed   mobility aid in reach   lighting adjusted   increase visualization of patient   increased rounding and observation   clutter free environment maintained   assistive device/personal items within reach  Taken 1/28/2025 0800 by Evelia Avalos RN  Safety Promotion/Fall Prevention:   activity supervised   treat reversible contributory factors   treat underlying cause   supervised activity   safety round/check completed   room near nurse's station   room door open   patient and family education   nonskid shoes/slippers when out of bed   mobility aid in reach   lighting adjusted   increase visualization of patient   increased rounding and observation   clutter free environment maintained   assistive device/personal items within reach  Intervention: Prevent Skin Injury  Recent Flowsheet Documentation  Taken 1/28/2025 1600 by Evelia Avalos, RN  Body Position:   " turned   log-rolled   legs elevated   heels elevated   weight shifting   upper extremity elevated   side-lying 30 degrees   lower extremity elevated   neutral body alignment  Skin Protection:   adhesive use limited   tubing/devices free from skin contact   skin to device areas padded   silicone foam dressing in place   pulse oximeter probe site changed   incontinence pads utilized  Taken 1/28/2025 1400 by Evelia Avalos RN  Body Position:   turned   log-rolled   legs elevated   heels elevated   weight shifting   upper extremity elevated   side-lying 30 degrees   lower extremity elevated   neutral body alignment  Taken 1/28/2025 1200 by Evelia Avalos RN  Body Position:   turned   log-rolled   legs elevated   heels elevated   weight shifting   upper extremity elevated   side-lying 30 degrees   lower extremity elevated   neutral body alignment  Skin Protection:   adhesive use limited   tubing/devices free from skin contact   skin to device areas padded   silicone foam dressing in place   pulse oximeter probe site changed   incontinence pads utilized  Taken 1/28/2025 1000 by Evelia Avalos RN  Body Position:   turned   log-rolled   legs elevated   heels elevated   weight shifting   upper extremity elevated   side-lying 30 degrees   lower extremity elevated   neutral body alignment  Taken 1/28/2025 0800 by Evelia Avalos RN  Body Position:   turned   log-rolled   legs elevated   heels elevated   weight shifting   upper extremity elevated   side-lying 30 degrees   lower extremity elevated   neutral body alignment  Skin Protection:   adhesive use limited   tubing/devices free from skin contact   skin to device areas padded   silicone foam dressing in place   pulse oximeter probe site changed   incontinence pads utilized  Intervention: Prevent and Manage VTE (Venous Thromboembolism) Risk  Recent Flowsheet Documentation  Taken 1/28/2025 1600 by Evelia Avalos RN  VTE Prevention/Management: SCDs on (sequential  compression devices)  Taken 1/28/2025 1200 by Evelia Avalos RN  VTE Prevention/Management: SCDs on (sequential compression devices)  Taken 1/28/2025 0800 by Evelia Avalos RN  VTE Prevention/Management: SCDs on (sequential compression devices)  Intervention: Prevent Infection  Recent Flowsheet Documentation  Taken 1/28/2025 1600 by Evelia Avalos RN  Infection Prevention:   single patient room provided   rest/sleep promoted   hand hygiene promoted   equipment surfaces disinfected   personal protective equipment utilized  Taken 1/28/2025 1200 by Evelia Avalos RN  Infection Prevention:   single patient room provided   rest/sleep promoted   hand hygiene promoted   equipment surfaces disinfected   personal protective equipment utilized  Taken 1/28/2025 0800 by Evelia Avalos RN  Infection Prevention:   single patient room provided   rest/sleep promoted   hand hygiene promoted   equipment surfaces disinfected   personal protective equipment utilized  Goal: Optimal Comfort and Wellbeing  Outcome: Progressing  Intervention: Monitor Pain and Promote Comfort  Recent Flowsheet Documentation  Taken 1/28/2025 1600 by Evelia Avalos RN  Pain Management Interventions:   rest   repositioned   quiet environment facilitated   pillow support provided   care clustered  Taken 1/28/2025 1515 by Evelia Avalos RN  Pain Management Interventions:   medication (see MAR)   care clustered   pillow support provided   pain management plan reviewed with patient/caregiver   rest   repositioned  Taken 1/28/2025 1200 by Evelia Avalos RN  Pain Management Interventions:   rest   repositioned   quiet environment facilitated   pillow support provided   care clustered  Taken 1/28/2025 0800 by Evelia Avalos RN  Pain Management Interventions:   rest   repositioned   quiet environment facilitated   pillow support provided   care clustered  Intervention: Provide Person-Centered Care  Recent Flowsheet Documentation  Taken  1/28/2025 1600 by Evelia Avalos RN  Trust Relationship/Rapport:   care explained   thoughts/feelings acknowledged   reassurance provided   emotional support provided   choices provided   questions answered  Taken 1/28/2025 1200 by Evelia Avalos RN  Trust Relationship/Rapport:   care explained   thoughts/feelings acknowledged   reassurance provided  Taken 1/28/2025 0800 by Evelia Avalos RN  Trust Relationship/Rapport:   care explained   thoughts/feelings acknowledged   reassurance provided  Goal: Readiness for Transition of Care  Outcome: Progressing     Problem: Surgery Nonspecified  Goal: Absence of Bleeding  Outcome: Progressing  Intervention: Monitor and Manage Bleeding  Recent Flowsheet Documentation  Taken 1/28/2025 1600 by Evelia Avalos RN  Bleeding Management: dressing monitored  Taken 1/28/2025 1200 by Evelia Avalos RN  Bleeding Management: dressing monitored  Taken 1/28/2025 0800 by Evelia Avalos RN  Bleeding Management: dressing monitored  Goal: Effective Bowel Elimination  Outcome: Progressing  Goal: Fluid and Electrolyte Balance  Outcome: Progressing  Intervention: Monitor and Manage Fluid and Electrolyte Balance  Recent Flowsheet Documentation  Taken 1/28/2025 1600 by Evelia Avalos RN  Fluid/Electrolyte Management: fluids provided  Taken 1/28/2025 1200 by Evelia Avalos RN  Fluid/Electrolyte Management: fluids provided  Taken 1/28/2025 0800 by Evelia Avalos RN  Fluid/Electrolyte Management: fluids provided  Goal: Blood Glucose Level Within Targeted Range  Outcome: Progressing  Intervention: Optimize Glycemic Control  Recent Flowsheet Documentation  Taken 1/28/2025 1600 by Evelia Avalos RN  Hyperglycemia Management:   blood glucose monitored   correctional insulin given  Hypoglycemia Management: blood glucose monitored  Taken 1/28/2025 1200 by Evelia Avalos RN  Hyperglycemia Management:   blood glucose monitored   correctional insulin given  Hypoglycemia  Management: blood glucose monitored  Taken 1/28/2025 0800 by Evelia Avalos RN  Hyperglycemia Management:   blood glucose monitored   correctional insulin given  Hypoglycemia Management: blood glucose monitored  Goal: Absence of Infection Signs and Symptoms  Outcome: Progressing  Intervention: Prevent or Manage Infection  Recent Flowsheet Documentation  Taken 1/28/2025 1600 by Evelia Avalos RN  Infection Management: aseptic technique maintained  Isolation Precautions: contact precautions maintained  Taken 1/28/2025 1200 by Evelia Avalos RN  Infection Management: aseptic technique maintained  Isolation Precautions: contact precautions maintained  Taken 1/28/2025 0800 by Evelia Avalos RN  Infection Management: aseptic technique maintained  Isolation Precautions: contact precautions maintained  Goal: Anesthesia/Sedation Recovery  Outcome: Progressing  Intervention: Optimize Anesthesia Recovery  Recent Flowsheet Documentation  Taken 1/28/2025 1600 by Evelia Avalos RN  Safety Promotion/Fall Prevention:   activity supervised   treat reversible contributory factors   treat underlying cause   supervised activity   safety round/check completed   room near nurse's station   room door open   patient and family education   nonskid shoes/slippers when out of bed   mobility aid in reach   lighting adjusted   increase visualization of patient   increased rounding and observation   clutter free environment maintained   assistive device/personal items within reach  Administration (IS): unable to perform  Taken 1/28/2025 1200 by Evelia Avalos RN  Safety Promotion/Fall Prevention:   activity supervised   treat reversible contributory factors   treat underlying cause   supervised activity   safety round/check completed   room near nurse's station   room door open   patient and family education   nonskid shoes/slippers when out of bed   mobility aid in reach   lighting adjusted   increase visualization of  patient   increased rounding and observation   clutter free environment maintained   assistive device/personal items within reach  Administration (IS): unable to perform  Taken 1/28/2025 0800 by Evelia Avalos RN  Safety Promotion/Fall Prevention:   activity supervised   treat reversible contributory factors   treat underlying cause   supervised activity   safety round/check completed   room near nurse's station   room door open   patient and family education   nonskid shoes/slippers when out of bed   mobility aid in reach   lighting adjusted   increase visualization of patient   increased rounding and observation   clutter free environment maintained   assistive device/personal items within reach  Administration (IS): unable to perform  Goal: Optimal Pain Control and Function  Outcome: Progressing  Intervention: Prevent or Manage Pain  Recent Flowsheet Documentation  Taken 1/28/2025 1600 by Evelia Avalos RN  Pain Management Interventions:   rest   repositioned   quiet environment facilitated   pillow support provided   care clustered  Taken 1/28/2025 1515 by Evelia Avalos RN  Pain Management Interventions:   medication (see MAR)   care clustered   pillow support provided   pain management plan reviewed with patient/caregiver   rest   repositioned  Taken 1/28/2025 1200 by Evelia Avalos RN  Pain Management Interventions:   rest   repositioned   quiet environment facilitated   pillow support provided   care clustered  Taken 1/28/2025 0800 by Evelia Avalos RN  Pain Management Interventions:   rest   repositioned   quiet environment facilitated   pillow support provided   care clustered  Goal: Nausea and Vomiting Relief  Outcome: Progressing  Goal: Effective Urinary Elimination  Outcome: Progressing  Intervention: Monitor and Manage Urinary Retention  Recent Flowsheet Documentation  Taken 1/28/2025 1600 by Evelia Avalos, RN  Urinary Elimination Promotion: bladder volume assessed by  ultrasound  Taken 1/28/2025 1200 by Evelia Avalos RN  Urinary Elimination Promotion: bladder volume assessed by ultrasound  Taken 1/28/2025 0800 by Evleia Avalos RN  Urinary Elimination Promotion: bladder volume assessed by ultrasound  Goal: Effective Oxygenation and Ventilation  Outcome: Progressing  Intervention: Optimize Oxygenation and Ventilation  Recent Flowsheet Documentation  Taken 1/28/2025 1600 by Evelia Avalos RN  Cough And Deep Breathing: unable to perform  Airway/Ventilation Management:   airway patency maintained   pulmonary hygiene promoted   calming measures promoted   oxygen therapy provided   position adjusted  Activity Management:   activity adjusted per tolerance   activity minimized  Head of Bed (HOB) Positioning: HOB at 30 degrees  Taken 1/28/2025 1400 by Evelia Avalos RN  Activity Management:   activity adjusted per tolerance   activity minimized  Head of Bed (HOB) Positioning: HOB at 30 degrees  Taken 1/28/2025 1200 by Evelia Avalos RN  Cough And Deep Breathing: unable to perform  Airway/Ventilation Management:   airway patency maintained   pulmonary hygiene promoted   calming measures promoted   oxygen therapy provided   position adjusted  Activity Management:   activity adjusted per tolerance   activity minimized  Head of Bed (HOB) Positioning: HOB at 30 degrees  Taken 1/28/2025 1000 by Evelia Avalos RN  Activity Management:   activity adjusted per tolerance   activity minimized  Head of Bed (HOB) Positioning: HOB at 30 degrees  Taken 1/28/2025 0800 by Evelia Avalos RN  Cough And Deep Breathing: unable to perform  Airway/Ventilation Management:   airway patency maintained   pulmonary hygiene promoted   calming measures promoted   oxygen therapy provided   position adjusted  Activity Management:   activity adjusted per tolerance   activity minimized  Head of Bed (HOB) Positioning: HOB at 30 degrees     Problem: Restraint, Nonviolent  Goal: Absence of Harm or  Injury  Outcome: Progressing  Intervention: Implement Least Restrictive Safety Strategies  Recent Flowsheet Documentation  Taken 1/28/2025 1600 by Evelia Avalos RN  Medical Device Protection:   IV pole/bag removed from visual field   tubing secured  Taken 1/28/2025 1200 by Evelia Avalos RN  Medical Device Protection:   IV pole/bag removed from visual field   tubing secured  Taken 1/28/2025 0800 by Evelia Avalos RN  Medical Device Protection:   IV pole/bag removed from visual field   tubing secured  Intervention: Protect Dignity, Rights and Personal Wellbeing  Recent Flowsheet Documentation  Taken 1/28/2025 1600 by Evelia Avalos RN  Trust Relationship/Rapport:   care explained   thoughts/feelings acknowledged   reassurance provided   emotional support provided   choices provided   questions answered  Taken 1/28/2025 1200 by Evelia Avalos RN  Trust Relationship/Rapport:   care explained   thoughts/feelings acknowledged   reassurance provided  Taken 1/28/2025 0800 by Evelia Avalos RN  Trust Relationship/Rapport:   care explained   thoughts/feelings acknowledged   reassurance provided  Intervention: Protect Skin and Joint Integrity  Recent Flowsheet Documentation  Taken 1/28/2025 1600 by Evelia Avalos RN  Body Position:   turned   log-rolled   legs elevated   heels elevated   weight shifting   upper extremity elevated   side-lying 30 degrees   lower extremity elevated   neutral body alignment  Skin Protection:   adhesive use limited   tubing/devices free from skin contact   skin to device areas padded   silicone foam dressing in place   pulse oximeter probe site changed   incontinence pads utilized  Taken 1/28/2025 1400 by Evelia Avalos RN  Body Position:   turned   log-rolled   legs elevated   heels elevated   weight shifting   upper extremity elevated   side-lying 30 degrees   lower extremity elevated   neutral body alignment  Taken 1/28/2025 1200 by Evelia Avalos RN  Body  Position:   turned   log-rolled   legs elevated   heels elevated   weight shifting   upper extremity elevated   side-lying 30 degrees   lower extremity elevated   neutral body alignment  Skin Protection:   adhesive use limited   tubing/devices free from skin contact   skin to device areas padded   silicone foam dressing in place   pulse oximeter probe site changed   incontinence pads utilized  Taken 1/28/2025 1000 by Evelia Avalos RN  Body Position:   turned   log-rolled   legs elevated   heels elevated   weight shifting   upper extremity elevated   side-lying 30 degrees   lower extremity elevated   neutral body alignment  Taken 1/28/2025 0800 by Evelia Avalos RN  Body Position:   turned   log-rolled   legs elevated   heels elevated   weight shifting   upper extremity elevated   side-lying 30 degrees   lower extremity elevated   neutral body alignment  Skin Protection:   adhesive use limited   tubing/devices free from skin contact   skin to device areas padded   silicone foam dressing in place   pulse oximeter probe site changed   incontinence pads utilized     Problem: Risk for Delirium  Goal: Optimal Coping  Outcome: Progressing  Goal: Improved Behavioral Control  Outcome: Progressing  Intervention: Minimize Safety Risk  Recent Flowsheet Documentation  Taken 1/28/2025 1600 by Evelia Avalos, RN  Enhanced Safety Measures:   room near unit station   review medications for side effects with activity   Pre/Post Op education on fall prevention   patient/family teach back on injury risk   pain management   monitor patients coagulation values   assistive devices when indicated  Trust Relationship/Rapport:   care explained   thoughts/feelings acknowledged   reassurance provided   emotional support provided   choices provided   questions answered  Taken 1/28/2025 1200 by Evelia Avalos, RN  Enhanced Safety Measures:   room near unit station   review medications for side effects with activity   Pre/Post Op  education on fall prevention   patient/family teach back on injury risk   pain management   monitor patients coagulation values   assistive devices when indicated  Trust Relationship/Rapport:   care explained   thoughts/feelings acknowledged   reassurance provided  Taken 1/28/2025 0800 by Evleia Avalos, RN  Enhanced Safety Measures:   room near unit station   review medications for side effects with activity   Pre/Post Op education on fall prevention   patient/family teach back on injury risk   pain management   monitor patients coagulation values   assistive devices when indicated  Trust Relationship/Rapport:   care explained   thoughts/feelings acknowledged   reassurance provided  Goal: Improved Attention and Thought Clarity  Outcome: Progressing  Goal: Improved Sleep  Outcome: Progressing

## 2025-01-30 ENCOUNTER — APPOINTMENT (OUTPATIENT)
Dept: OCCUPATIONAL THERAPY | Facility: CLINIC | Age: 65
DRG: 853 | End: 2025-01-30
Attending: INTERNAL MEDICINE
Payer: MEDICARE

## 2025-01-30 ENCOUNTER — APPOINTMENT (OUTPATIENT)
Dept: GENERAL RADIOLOGY | Facility: CLINIC | Age: 65
DRG: 853 | End: 2025-01-30
Attending: INTERNAL MEDICINE
Payer: MEDICARE

## 2025-01-30 ENCOUNTER — APPOINTMENT (OUTPATIENT)
Dept: PHYSICAL THERAPY | Facility: CLINIC | Age: 65
End: 2025-01-30
Attending: INTERNAL MEDICINE
Payer: MEDICARE

## 2025-01-30 VITALS
HEART RATE: 99 BPM | SYSTOLIC BLOOD PRESSURE: 104 MMHG | OXYGEN SATURATION: 96 % | TEMPERATURE: 97.5 F | RESPIRATION RATE: 19 BRPM | DIASTOLIC BLOOD PRESSURE: 72 MMHG | WEIGHT: 178.13 LBS | BODY MASS INDEX: 23.5 KG/M2

## 2025-01-30 PROBLEM — J96.01 ACUTE RESPIRATORY FAILURE WITH HYPOXIA (H): Status: ACTIVE | Noted: 2025-01-30

## 2025-01-30 PROBLEM — J90 PLEURAL EFFUSION: Status: ACTIVE | Noted: 2022-10-18

## 2025-01-30 PROBLEM — G92.8 TOXIC METABOLIC ENCEPHALOPATHY: Status: ACTIVE | Noted: 2025-01-30

## 2025-01-30 PROBLEM — L89.899 PRESSURE ULCER OF FOOT: Status: ACTIVE | Noted: 2025-01-30

## 2025-01-30 PROBLEM — R57.9 SHOCK (H): Status: ACTIVE | Noted: 2025-01-30

## 2025-01-30 LAB
ALBUMIN SERPL BCG-MCNC: 1.8 G/DL (ref 3.5–5.2)
ALP SERPL-CCNC: 115 U/L (ref 40–150)
ALT SERPL W P-5'-P-CCNC: 6 U/L (ref 0–70)
ANION GAP SERPL CALCULATED.3IONS-SCNC: 16 MMOL/L (ref 7–15)
AST SERPL W P-5'-P-CCNC: 12 U/L (ref 0–45)
BACTERIA BLD CULT: NORMAL
BACTERIA BLD CULT: NORMAL
BASOPHILS # BLD AUTO: 0.1 10E3/UL (ref 0–0.2)
BASOPHILS NFR BLD AUTO: 0 %
BILIRUB DIRECT SERPL-MCNC: <0.2 MG/DL (ref 0–0.3)
BILIRUB SERPL-MCNC: 0.2 MG/DL
BUN SERPL-MCNC: 60 MG/DL (ref 8–23)
CALCIUM SERPL-MCNC: 9.2 MG/DL (ref 8.8–10.4)
CHLORIDE SERPL-SCNC: 94 MMOL/L (ref 98–107)
CREAT SERPL-MCNC: 7.06 MG/DL (ref 0.67–1.17)
EGFRCR SERPLBLD CKD-EPI 2021: 8 ML/MIN/1.73M2
EOSINOPHIL # BLD AUTO: 0.1 10E3/UL (ref 0–0.7)
EOSINOPHIL NFR BLD AUTO: 0 %
ERYTHROCYTE [DISTWIDTH] IN BLOOD BY AUTOMATED COUNT: 18.6 % (ref 10–15)
GLUCOSE BLDC GLUCOMTR-MCNC: 138 MG/DL (ref 70–99)
GLUCOSE BLDC GLUCOMTR-MCNC: 164 MG/DL (ref 70–99)
GLUCOSE BLDC GLUCOMTR-MCNC: 188 MG/DL (ref 70–99)
GLUCOSE BLDC GLUCOMTR-MCNC: 242 MG/DL (ref 70–99)
GLUCOSE BLDC GLUCOMTR-MCNC: 292 MG/DL (ref 70–99)
GLUCOSE BLDC GLUCOMTR-MCNC: 295 MG/DL (ref 70–99)
GLUCOSE SERPL-MCNC: 348 MG/DL (ref 70–99)
HCO3 SERPL-SCNC: 22 MMOL/L (ref 22–29)
HCT VFR BLD AUTO: 26.2 % (ref 40–53)
HGB BLD-MCNC: 8.6 G/DL (ref 13.3–17.7)
IMM GRANULOCYTES # BLD: 0.3 10E3/UL
IMM GRANULOCYTES NFR BLD: 1 %
LACTATE SERPL-SCNC: 1 MMOL/L (ref 0.7–2)
LYMPHOCYTES # BLD AUTO: 0.8 10E3/UL (ref 0.8–5.3)
LYMPHOCYTES NFR BLD AUTO: 3 %
MAGNESIUM SERPL-MCNC: 1.9 MG/DL (ref 1.7–2.3)
MCH RBC QN AUTO: 31.6 PG (ref 26.5–33)
MCHC RBC AUTO-ENTMCNC: 32.8 G/DL (ref 31.5–36.5)
MCV RBC AUTO: 96 FL (ref 78–100)
MONOCYTES # BLD AUTO: 1.2 10E3/UL (ref 0–1.3)
MONOCYTES NFR BLD AUTO: 5 %
NEUTROPHILS # BLD AUTO: 20.6 10E3/UL (ref 1.6–8.3)
NEUTROPHILS NFR BLD AUTO: 90 %
NRBC # BLD AUTO: 0 10E3/UL
NRBC BLD AUTO-RTO: 0 /100
PHOSPHATE SERPL-MCNC: 6 MG/DL (ref 2.5–4.5)
PLATELET # BLD AUTO: 306 10E3/UL (ref 150–450)
POTASSIUM SERPL-SCNC: 4 MMOL/L (ref 3.4–5.3)
PROT SERPL-MCNC: 4.7 G/DL (ref 6.4–8.3)
RBC # BLD AUTO: 2.72 10E6/UL (ref 4.4–5.9)
SODIUM SERPL-SCNC: 132 MMOL/L (ref 135–145)
UFH PPP CHRO-ACNC: 0.16 IU/ML
UFH PPP CHRO-ACNC: 0.28 IU/ML
UFH PPP CHRO-ACNC: 0.42 IU/ML
WBC # BLD AUTO: 23.2 10E3/UL (ref 4–11)
WBC # BLD AUTO: 23.2 10E3/UL (ref 4–11)

## 2025-01-30 PROCEDURE — 99497 ADVNCD CARE PLAN 30 MIN: CPT | Mod: 25 | Performed by: REGISTERED NURSE

## 2025-01-30 PROCEDURE — 83735 ASSAY OF MAGNESIUM: CPT

## 2025-01-30 PROCEDURE — 99232 SBSQ HOSP IP/OBS MODERATE 35: CPT | Performed by: INTERNAL MEDICINE

## 2025-01-30 PROCEDURE — 250N000013 HC RX MED GY IP 250 OP 250 PS 637: Performed by: STUDENT IN AN ORGANIZED HEALTH CARE EDUCATION/TRAINING PROGRAM

## 2025-01-30 PROCEDURE — 85520 HEPARIN ASSAY: CPT | Performed by: STUDENT IN AN ORGANIZED HEALTH CARE EDUCATION/TRAINING PROGRAM

## 2025-01-30 PROCEDURE — 85027 COMPLETE CBC AUTOMATED: CPT

## 2025-01-30 PROCEDURE — 80048 BASIC METABOLIC PNL TOTAL CA: CPT

## 2025-01-30 PROCEDURE — 84100 ASSAY OF PHOSPHORUS: CPT

## 2025-01-30 PROCEDURE — 97530 THERAPEUTIC ACTIVITIES: CPT | Mod: GO

## 2025-01-30 PROCEDURE — 250N000013 HC RX MED GY IP 250 OP 250 PS 637: Performed by: INTERNAL MEDICINE

## 2025-01-30 PROCEDURE — 99291 CRITICAL CARE FIRST HOUR: CPT | Mod: 24 | Performed by: INTERNAL MEDICINE

## 2025-01-30 PROCEDURE — 99232 SBSQ HOSP IP/OBS MODERATE 35: CPT | Mod: 25 | Performed by: REGISTERED NURSE

## 2025-01-30 PROCEDURE — 97162 PT EVAL MOD COMPLEX 30 MIN: CPT | Mod: GP

## 2025-01-30 PROCEDURE — 97165 OT EVAL LOW COMPLEX 30 MIN: CPT | Mod: GO

## 2025-01-30 PROCEDURE — 200N000001 HC R&B ICU

## 2025-01-30 PROCEDURE — 71045 X-RAY EXAM CHEST 1 VIEW: CPT

## 2025-01-30 PROCEDURE — 82310 ASSAY OF CALCIUM: CPT

## 2025-01-30 PROCEDURE — 83605 ASSAY OF LACTIC ACID: CPT | Performed by: INTERNAL MEDICINE

## 2025-01-30 PROCEDURE — 250N000011 HC RX IP 250 OP 636

## 2025-01-30 PROCEDURE — 90945 DIALYSIS ONE EVALUATION: CPT

## 2025-01-30 PROCEDURE — 97530 THERAPEUTIC ACTIVITIES: CPT | Mod: GP

## 2025-01-30 PROCEDURE — 82435 ASSAY OF BLOOD CHLORIDE: CPT

## 2025-01-30 PROCEDURE — 250N000013 HC RX MED GY IP 250 OP 250 PS 637

## 2025-01-30 PROCEDURE — 250N000009 HC RX 250

## 2025-01-30 PROCEDURE — 80069 RENAL FUNCTION PANEL: CPT

## 2025-01-30 PROCEDURE — 85025 COMPLETE CBC W/AUTO DIFF WBC: CPT

## 2025-01-30 PROCEDURE — 80053 COMPREHEN METABOLIC PANEL: CPT

## 2025-01-30 PROCEDURE — 82248 BILIRUBIN DIRECT: CPT | Performed by: INTERNAL MEDICINE

## 2025-01-30 PROCEDURE — 85520 HEPARIN ASSAY: CPT | Performed by: INTERNAL MEDICINE

## 2025-01-30 RX ORDER — METHOCARBAMOL 750 MG/1
750 TABLET, FILM COATED ORAL 3 TIMES DAILY PRN
Status: DISCONTINUED | OUTPATIENT
Start: 2025-01-30 | End: 2025-01-31

## 2025-01-30 RX ORDER — MIDODRINE HYDROCHLORIDE 5 MG/1
5 TABLET ORAL 2 TIMES DAILY
Status: DISCONTINUED | OUTPATIENT
Start: 2025-01-30 | End: 2025-01-31

## 2025-01-30 RX ORDER — GENTAMICIN SULFATE 1 MG/G
CREAM TOPICAL DAILY PRN
Status: DISCONTINUED | OUTPATIENT
Start: 2025-01-30 | End: 2025-01-30

## 2025-01-30 RX ADMIN — OXYCODONE HYDROCHLORIDE 2.5 MG: 5 TABLET ORAL at 15:15

## 2025-01-30 RX ADMIN — MIDODRINE HYDROCHLORIDE 5 MG: 5 TABLET ORAL at 14:05

## 2025-01-30 RX ADMIN — HEPARIN SODIUM 1900 UNITS/HR: 10000 INJECTION, SOLUTION INTRAVENOUS at 11:51

## 2025-01-30 RX ADMIN — OXYCODONE HYDROCHLORIDE 2.5 MG: 5 TABLET ORAL at 20:15

## 2025-01-30 RX ADMIN — CLOPIDOGREL BISULFATE 75 MG: 75 TABLET ORAL at 20:16

## 2025-01-30 RX ADMIN — CALCITRIOL CAPSULES 0.25 MCG 0.25 MCG: 0.25 CAPSULE ORAL at 20:16

## 2025-01-30 RX ADMIN — ATORVASTATIN CALCIUM 40 MG: 40 TABLET, FILM COATED ORAL at 20:15

## 2025-01-30 RX ADMIN — ACETAMINOPHEN 975 MG: 325 TABLET, FILM COATED ORAL at 20:15

## 2025-01-30 RX ADMIN — OXYCODONE HYDROCHLORIDE 2.5 MG: 5 TABLET ORAL at 23:33

## 2025-01-30 RX ADMIN — PIPERACILLIN AND TAZOBACTAM 2.25 G: 2; .25 INJECTION, POWDER, FOR SOLUTION INTRAVENOUS at 04:19

## 2025-01-30 RX ADMIN — SEVELAMER CARBONATE 800 MG: 800 TABLET, FILM COATED ORAL at 11:32

## 2025-01-30 RX ADMIN — PIPERACILLIN AND TAZOBACTAM 2.25 G: 2; .25 INJECTION, POWDER, FOR SOLUTION INTRAVENOUS at 20:16

## 2025-01-30 RX ADMIN — Medication 5 MG: at 22:05

## 2025-01-30 RX ADMIN — MIDODRINE HYDROCHLORIDE 5 MG: 5 TABLET ORAL at 20:15

## 2025-01-30 RX ADMIN — PIPERACILLIN AND TAZOBACTAM 2.25 G: 2; .25 INJECTION, POWDER, FOR SOLUTION INTRAVENOUS at 11:30

## 2025-01-30 RX ADMIN — SEVELAMER CARBONATE 800 MG: 800 TABLET, FILM COATED ORAL at 17:34

## 2025-01-30 RX ADMIN — ACETAMINOPHEN 975 MG: 325 TABLET, FILM COATED ORAL at 11:32

## 2025-01-30 RX ADMIN — PANTOPRAZOLE SODIUM 40 MG: 40 INJECTION, POWDER, FOR SOLUTION INTRAVENOUS at 07:54

## 2025-01-30 RX ADMIN — SEVELAMER CARBONATE 800 MG: 800 TABLET, FILM COATED ORAL at 08:02

## 2025-01-30 RX ADMIN — ACETAMINOPHEN 975 MG: 325 TABLET, FILM COATED ORAL at 04:23

## 2025-01-30 RX ADMIN — Medication 1 CAPSULE: at 08:02

## 2025-01-30 ASSESSMENT — ACTIVITIES OF DAILY LIVING (ADL)
ADLS_ACUITY_SCORE: 62
ADLS_ACUITY_SCORE: 65
ADLS_ACUITY_SCORE: 65
ADLS_ACUITY_SCORE: 61
ADLS_ACUITY_SCORE: 62
ADLS_ACUITY_SCORE: 72
ADLS_ACUITY_SCORE: 62
ADLS_ACUITY_SCORE: 65
ADLS_ACUITY_SCORE: 72
ADLS_ACUITY_SCORE: 61
ADLS_ACUITY_SCORE: 61
ADLS_ACUITY_SCORE: 62
ADLS_ACUITY_SCORE: 61
ADLS_ACUITY_SCORE: 65
ADLS_ACUITY_SCORE: 62
ADLS_ACUITY_SCORE: 62
ADLS_ACUITY_SCORE: 65
ADLS_ACUITY_SCORE: 62
ADLS_ACUITY_SCORE: 72
ADLS_ACUITY_SCORE: 65
ADLS_ACUITY_SCORE: 61
ADLS_ACUITY_SCORE: 62
ADLS_ACUITY_SCORE: 72

## 2025-01-30 NOTE — PROGRESS NOTES
Critical Care  Note      01/30/2025    Name: Arnulfo Pritchett MRN#: [id number]   Age: 64 year old YOB: 1960     Hsptl Day# 9  ICU DAY #    MV DAY #             Problem List:   Active Problems:    Pleural effusion    Cellulitis    Pressure ulcer of foot    Acute respiratory failure with hypoxia (H)    Shock (H)    Toxic metabolic encephalopathy           Summary/Hospital Course:   Arnulfo Pritchett is a 64 year old male with personal medical history of severe heart failure (EF 30%), ESRD (on peritoneal dialysis), chronic paroxysmal AF on warfarin, LLE PAD s/p endarterectomy, RCC s/p nephrectomy admitted on 1/21/2025 for septic shock secondary to right leg PAD with worsening right heel necrotic ulcer. The patient was seen in ED at outside hospital on 1/3/25 and diagnosed with pneumonia with completed course of Cefdinir. He then presented on 1/21/25 to outside hospital for right leg pain and found to have right leg ulcer and transferred to [facility] for vascular surgery evaluation. Now s/p right common femoral BK popliteal/tibial endarterectomy and right common femoral to below-knee popliteal/tibial PTFE bypass performed on 1/27/25. The patient was admitted to the ICU for requirement of mechanical ventilation and pressor support following surgery for multifactorial shock.     Interval/overnight events:  -no acute overnight events  -weaning oxygen requirements and tolerating well  -peritoneal dialysis ran overnight 1/30  -had additional discussion with patient regarding goals of care including desire for palliative vs hospice, patient expressed desire for life-prolonging cares at this time  -no issues with swallowing and tolerating diet advancement  -reporting mild, dull abdominal pain R>L throughout the day      Assessment and plan :     Arnulfo Pritchett IS a 64 year old male admitted on 1/21/2025 for septic shock secondary to right leg PAD with worsening right heel necrotic ulcer now s/p right popliteal  endarterectomy with PTFE bypass on 1/27/25 requiring mechanical ventilation and pressure support for multifactorial shock following surgery.     I have personally reviewed the daily labs, imaging studies, cultures and discussed the case with referring physician and consulting physicians.     My assessment and plan by system for this patient is as follows:    Neurology/Psychiatry:   # Pain/analgesia with frequent opioid administration  # Sedation  # Delirium  Palliative following - goals of care conference completed 1/29, plans for ongoing discussions with patient and family  Scheduled Tylenol, Oxycodone 2.5 mg q4hr prn - has not required opioids for pain since 1/29  Restarted PTA Melatonin 5mg    MSK:   # Hx PAD of LLE s/p endarterectomy and fem-tibioperoneal trunk bypass on 10/25/24  # Hx RLE arterial occlusion s/p SFA angioplasty and stent 5/2024  # PAD RLE, right heel gangrene, occluded SFA, no evidence of osteomyelitis  # s/p right common femoral and popliteal/tibial endarterectomy, right femoral to popliteal/tibial PTFE bypass 1/27/25  Vascular surgery was following -signed off  Wound care following  Podiatry following  PT/OT ordered    Cardiovascular:   # Chronic paroxysmal atrial fibrillation on warfarin PTA  # Ischemic cardiomyopathy with HLD, CAD s/p multiple stents, and severe EFrEF (30%)  # Multifactorial shock, most likely vasoplegic secondary to stress of surgery and sedation vs less likely hypovolemic as patient has minimal urine output at baseline with known ESRD, lactate now normalized and coming down on vasopressors  Maintain MAP goal >65, still requiring small dose of Epi, wean as able  Continue heparin IV 1600 units/hr  Continue Plavix, continue Atorvastatin  Hold warfarin, reversed with vit K by vascular surgery on 1/22/25.  Defer to their judgment on resuming  Midodrine 5mg BID scheduled  Holding PTA metoprolol give continued pressor requirements  Echo completed today, results  pending    Pulmonary/Ventilator Management:   # Extubated 1/29 - initially mechanically ventilated for surgery during procedure on 1/27 and remained intubated due to encephalopathy shock and hypoxia  # Right pleural effusion with passive atelectasis - stable.  Does not appear to be contributing to any respiratory failure  # s/p thoracentesis 1/24/25 with 1.5 L clear yellow fluid removed, likely transudative based on low cell count of 117, , and protein of 1.7. Possibly secondary to reduced peritoneal dialysis during hospitalization vs underlying severe HFrEF (30%)  # Mixed anion gap acidosis, improved respiratory function, airway protection and overall wakefulness following decreased opioid needs  Continue to monitor for need of thoracentesis  Repeat CXR ordered to monitor right pleural effusion    GI and Nutrition :   # Moderate protein-calorie malnutrition  # Mild abdominal pain R>L on 1/30/25, repeat lactic acid normal at 1.0  Continue regular diet with advancement as tolerated  Hepatic panel ordered  Repeat lactic acid ordered - given normal result, no abdominal imaging for abdominal pain indicated at this time    Renal/Fluids/Electrolytes:   # ESRD on peritoneal dialysis, receiving daily PD.  Did not remove fluid overnight of 1/28 to 1/29, net +7.4L since admission, able to tolerate 1L of fluid removal with PD on 1/30, will need to continue to work on tolerating more  # Hx of RCC s/p R nephrectomy  # Anion gap metabolic acidosis. Lactic acid improving.  # Mild Hyponatremia  Nephrology following  Daily BMP  Continue daily PD    Infectious Disease:   # R heel necrotic ulcer and gangrenous leg. S/p bypass of right femoral to popliteal artery 1/27/25  # MRSA positive nasal swab on 1/21/25, blood culture no growth to date  # Leukocytosis - increase to 23.2 today from previous 14.1 - no obvious source of infection, patient remains afebrile, hepatic panel and lactic acid added ordered in setting of abdominal  pain - c-diff unlikely given no signs of diarrhea, VRE and candida possible given patient's increased risk with chronic dialysis, necrotic ulcer of right heel unlikely cause of acute elevation given no signs of infection per today's podiatry eval  Continue 10 day course of IV Zosyn for foot wound - on day 7/10 - complete 2/1/25    Endocrine:   # Hx of secondary hyperparathyroidism and hyperphosphatemia, phosphorous elevated above baseline of 6 at most recent of 7.9  # Hx type 2 DM, last A1c of 5.7 on 10/2024, beta hydroxybutyrate <0.1.  Has been on dialysis for quite some time.  Medium dose insulin sliding scale  Restart Lantus now that patient is tolerating p.o  Continue Sensipar and sevelamer PTA for secondary hyperparathyroidism    Hematology/Oncology:   # Anemia of chronic disease, Hgb of 8.6, stable, no signs or symptoms of active bleeding, downtrend from 12.2 on 1/22/25  # Leukocytosis - WBC 23.2, increase from 14.1 - see above  Daily CBC  Given recent procedure, transfuse blood at Hgb < 8.  May change to less than 7     IV/Access:   1. Venous access - CVC R IJ  2. Arterial access - Right radial line  3. Peritoneal dialysis catheter    ICU Prophylaxis:   1. DVT: IV Heparin  2. VAP: HOB 30 degrees, chlorhexidine rinse  3. Stress Ulcer: PPI/H2 blocker  4. Restraints: Nonviolent soft two point restraints required and necessary for patient safety and continued cares and good effect as patient continues to pull at necessary lines, tubes despite education and distraction. Will readdress daily.   5. Wound care  - WOC following  6. Feeding - regular diet advancing as tolerated  7. Family Update: left voicemail for son Ervin  8. Disposition - ICU    Clinically Significant Risk Factors         # Hyponatremia: Lowest Na = 132 mmol/L in last 2 days, will monitor as appropriate  # Hypochloremia: Lowest Cl = 94 mmol/L in last 2 days, will monitor as appropriate      # Hypoalbuminemia: Lowest albumin = 2.3 g/dL at 1/27/2025   6:52 PM, will monitor as appropriate     # Hypertension: Noted on problem list             # Severe Malnutrition: based on nutrition assessment    # Financial/Environmental Concerns:          Code Status: No CPR- Do NOT Intubate                      Key Medications:     Current Facility-Administered Medications   Medication Dose Route Frequency Provider Last Rate Last Admin    - MEDICATION INSTRUCTIONS for Dialysis Patients -   Does not apply See Admin Instructions Melissa Macias MD        acetaminophen (TYLENOL) tablet 975 mg  975 mg Oral or Feeding Tube Q8H Enedina Osman MD   975 mg at 01/30/25 1132    Or    acetaminophen (TYLENOL) Suppository 650 mg  650 mg Rectal Q8H Enedina Osman MD        [Held by provider] allopurinol (ZYLOPRIM) tablet 200 mg  200 mg Oral QPM Milo Carrion MD   200 mg at 01/26/25 1957    atorvastatin (LIPITOR) tablet 40 mg  40 mg Oral or Feeding Tube At Bedtime Melissa Macias MD   40 mg at 01/29/25 2036    calcitRIOL (ROCALTROL) capsule 0.25 mcg  0.25 mcg Oral At Bedtime Melissa Macias MD   0.25 mcg at 01/29/25 2046    cinacalcet (SENSIPAR) tablet 60 mg  60 mg Oral Q Mon Wed Fri AM Milo Carrion MD   60 mg at 01/29/25 2035    clopidogrel (PLAVIX) tablet 75 mg  75 mg Oral or Feeding Tube QPM Melissa Macias MD   75 mg at 01/29/25 2035    insulin aspart (NovoLOG) injection (RAPID ACTING)  1-7 Units Subcutaneous Q4H Patricia Dykes APRN CNP   3 Units at 01/30/25 1133    insulin glargine (LANTUS PEN) injection 15 Units  15 Units Subcutaneous Daily Enedina Osman MD   15 Units at 01/30/25 1132    melatonin tablet 5 mg  5 mg Oral At Bedtime Enedina Osman MD        [Held by provider] metoprolol succinate ER (TOPROL XL) 24 hr tablet 25 mg  25 mg Oral Daily Milo Carrion MD   25 mg at 01/27/25 0848    midodrine (PROAMATINE) tablet 5 mg  5 mg Oral BID Enedina Osman MD   5 mg at 01/30/25 1409     multivitamin RENAL (TRIPHROCAPS) capsule 1 capsule  1 capsule Oral Daily Melissa Macias MD   1 capsule at 01/30/25 0802    pantoprazole (PROTONIX) 2 mg/mL suspension 40 mg  40 mg Per Feeding Tube QAM AC Patricia Dykes APRN CNP   40 mg at 01/28/25 0834    Or    pantoprazole (PROTONIX) IV push injection 40 mg  40 mg Intravenous QAM AC Patricia Dykes APRN CNP   40 mg at 01/30/25 0754    piperacillin-tazobactam (ZOSYN) 2.25 g vial to attach to  ml bag  2.25 g Intravenous Q8H Patricia Dykes APRN CNP   2.25 g at 01/30/25 1130    polyethylene glycol (MIRALAX) Packet 17 g  17 g Oral or NG Tube Daily Milo Carrion MD   17 g at 01/29/25 0816    senna-docusate (SENOKOT-S/PERICOLACE) 8.6-50 MG per tablet 1 tablet  1 tablet Oral or NG Tube BID Milo Carrion MD   1 tablet at 01/29/25 2036    sevelamer carbonate (RENVELA) tablet 800 mg  800 mg Oral TID w/meals Star Acuna MD   800 mg at 01/30/25 1132    sodium chloride (PF) 0.9% PF flush 3 mL  3 mL Intracatheter Q8H Milo Carrion MD   3 mL at 01/30/25 1132     Current Facility-Administered Medications   Medication Dose Route Frequency Provider Last Rate Last Admin    Continuing statin from home medication list OR statin order already placed during this visit   Does not apply DOES NOT GO TO Milo Hernandez MD        dextrose 10% infusion   Intravenous Continuous PRN Junior Chowdhury MD        dianeal PD LOW calcium-2.5% dex (calcium 2.5 mEq/L) 6,000 mL PERITONEAL DIALYSATE   Dialysis [facility] Supplied PD Star Acuna MD        dianeal PD LOW calcium-2.5% dex (calcium 2.5 mEq/L) 6,000 mL PERITONEAL DIALYSATE   Dialysis [facility] Supplied PD Star Acuna MD        dianeal PD LOW calcium-4.25% dex (calcium 2.5 mEq/L) 6,000 mL PERITONEAL DIALYSATE   Dialysis [facility] Supplied PD Star Acuna MD        EPINEPHrine (ADRENALIN) 5 mg in  mL infusion  0.01-0.3 mcg/kg/min Intravenous Continuous Patricia Dykes APRN CNP    Stopped at 01/30/25 1439    heparin 25,000 units in 0.45% NaCl 250 mL ANTICOAGULANT infusion  0-5,000 Units/hr Intravenous Continuous Milo Carrion MD 19 mL/hr at 01/30/25 1151 1,900 Units/hr at 01/30/25 1151    Patient is already receiving anticoagulation with heparin, enoxaparin (LOVENOX), warfarin (COUMADIN)  or other anticoagulant medication   Does not apply Continuous PRN Milo Carrion MD        sodium chloride 0.9 % infusion   Intravenous Continuous LambertMelissa MD 20 mL/hr at 01/29/25 2125 Rate Verify at 01/29/25 2125              Physical Examination:   Temp:  [98.3  F (36.8  C)-98.5  F (36.9  C)] 98.4  F (36.9  C)  Pulse:  [] 103  Resp:  [9-30] 22  BP: ()/(50-78) 104/72  MAP:  [59 mmHg-84 mmHg] 77 mmHg  Arterial Line BP: ()/(33-66) 109/61  SpO2:  [90 %-99 %] 95 %    Intake/Output Summary (Last 24 hours) at 1/30/2025 0913  Last data filed at 1/30/2025 0800  Gross per 24 hour   Intake 1324.78 ml   Output 1017 ml   Net 307.78 ml     Wt Readings from Last 4 Encounters:   01/30/25 80.8 kg (178 lb 2.1 oz)   01/21/25 75.8 kg (167 lb)   01/03/25 78.9 kg (174 lb)   01/02/25 78.9 kg (173 lb 14.4 oz)     Arterial Line BP: ()/(33-66) 109/61  MAP:  [59 mmHg-84 mmHg] 77 mmHg  BP - Mean:  [59-85] 83  FiO2 (%): 30 %, Resp: 22, Vent Mode: CPAP/PS, Resp Rate (Set): 14 breaths/min, Tidal Volume (Set, mL): 450 mL, PEEP (cm H2O): 5 cmH2O, Pressure Support (cm H2O): 5 cmH2O, Resp Rate (Set): 14 breaths/min, Tidal Volume (Set, mL): 450 mL, PEEP (cm H2O): 5 cmH2O  Recent Labs   Lab 01/29/25  1048 01/29/25  0209 01/28/25  2159 01/28/25 2026   PH 7.28* 7.29* 7.27* 7.26*   PCO2 50* 44 42 45   PO2 130* 117* 109* 121*   HCO3 23 21 19* 20*   O2PER 30 30 30 30       GEN: no acute distress, resting comfortably in bed  HEENT: head ncat, sclera anicteric  PULM: lungs clear to auscultation, breathing comfortably on RA  CV/COR: RRR S1S2 no gallop,  No rub, no murmur  ABD: soft, normoactive bowel  sounds, no mass, mild abdominal tenderness throughout upper abdominal region, greatest on right   EXT:  RLE covered in wound boot and WC dressings, LLE warm, unable to obtain dorsalis pedis or posterior tibialis pulses  NEURO: PERRL, no obvious deficits, alert, interactive with questions and discussion  SKIN: no obvious rash  LINES: clean, dry intact         Data:   All data and imaging reviewed     ROUTINE ICU LABS (Last four results)  CMP  Recent Labs   Lab 01/30/25  1120 01/30/25  0749 01/30/25  0431 01/30/25  0429 01/29/25  0357 01/29/25  0355 01/28/25  0429 01/28/25  0425 01/27/25  2241 01/27/25  2230 01/27/25  2034 01/27/25  1852 01/24/25  1659 01/24/25  1612   NA  --   --   --  132*  --  133*  --  134*  --  135  --  135   < >  --    POTASSIUM  --   --   --  4.0  --  4.4  --  5.2  --  4.8  --  4.3   < >  --    CHLORIDE  --   --   --  94*  --  95*  --  98  --  98  --  97*   < >  --    CO2  --   --   --  22  --  20*  --  21*  --  20*  --  21*   < >  --    ANIONGAP  --   --   --  16*  --  18*  --  15  --  17*  --  17*   < >  --    * 295* 292* 348*   < > 326*   < > 208*   < > 155*   < > 81   < >  --    BUN  --   --   --  60.0*  --  56.7*  --  51.3*  --  48.6*  --  48.5*   < >  --    CR  --   --   --  7.06*  --  7.69*  --  7.65*  --  7.38*  --  7.24*   < >  --    GFRESTIMATED  --   --   --  8*  --  7*  --  7*  --  8*  --  8*   < >  --    TERENCE  --   --   --  9.2  --  9.6  --  9.6  --  9.5  --  9.4   < >  --    MAG  --   --   --  1.9  --  2.0  --  1.7  --  1.6*  --  1.6*   < >  --    PHOS  --   --   --  6.0*  --  7.9*  --  8.0*  --  6.5*  --  6.1*   < >  --    PROTTOTAL  --   --   --   --   --   --   --   --   --   --   --  5.3*  --  5.7*   ALBUMIN  --   --   --   --   --   --   --   --   --   --   --  2.3*  --   --    BILITOTAL  --   --   --   --   --   --   --   --   --   --   --  0.3  --   --    ALKPHOS  --   --   --   --   --   --   --   --   --   --   --  101  --   --    AST  --   --   --   --   --   --    "--   --   --   --   --  12  --   --    ALT  --   --   --   --   --   --   --   --   --   --   --  8  --   --     < > = values in this interval not displayed.     CBC  Recent Labs   Lab 01/30/25  0429 01/29/25  0355 01/28/25  1347 01/28/25  0425   WBC 23.2*  23.2* 14.1* 16.9* 16.7*   RBC 2.72* 2.74* 3.06* 2.99*   HGB 8.6* 8.4* 9.5* 9.2*   HCT 26.2* 26.4* 29.3* 28.3*   MCV 96 96 96 95   MCH 31.6 30.7 31.0 30.8   MCHC 32.8 31.8 32.4 32.5   RDW 18.6* 18.3* 18.2* 18.3*    281 331 308     INR  Recent Labs   Lab 01/27/25  2230 01/23/25  1517   INR 1.77* 1.46*     Arterial Blood Gas  Recent Labs   Lab 01/29/25  1048 01/29/25  0209 01/28/25  2159 01/28/25  2026   PH 7.28* 7.29* 7.27* 7.26*   PCO2 50* 44 42 45   PO2 130* 117* 109* 121*   HCO3 23 21 19* 20*   O2PER 30 30 30 30       All cultures:  No results for input(s): \"CULT\" in the last 168 hours.  No results found for this or any previous visit (from the past 24 hours).    Chio Mancera, MS4        Physician Attestation   I, Enedina Osman MD, was present with the medical/WILLIAM student who participated in the service and in the documentation of the note.  I have verified the history and personally performed the physical exam and medical decision making.  I agree with the assessment and plan of care as documented in the note.      Key findings: respiratory status stable. WBC elevated and mild abdominal pain. LFTs wnl. Stooling normally. Pain improved in lower extremity. Central line and peritoneal dialysis catheter sites clean and dry. Afebrile. Still on low dose vasopressors.   - Change midodrine from PRN to scheduled  - Vigilance for infection  - other cares as per medical student note    Critical Care Time: 40 minutes excluding procedure time  Enedina Osman MD  Date of Service (when I saw the patient): 01/30/25    "

## 2025-01-30 NOTE — PROGRESS NOTES
Cycler set up per protocol and per treatment orders      Results from previous treatment:  Effluent color and clarity: clear yellow with no fibrin tissue noted  Total UF: 1017mL  Initial Drain: 158mL  Avg Dwell:  1:24  Added Dwell: 0:06     Cycler Serial Number:   Total Treatment Volume: 10,000 mL  Total treatment time:  9 hrs  Fill Volume: 2,000ml  Last Fill Volume: 0ml  Heater ba,000mL;  Lot #: [id number];  Expiration Date: 2026  Side Bag #1: 6,000mL;  Lot #: [id number];  Expiration Date: 2026   Cassette Lot# [id number], exp.     Number of cycles including final fill: 5  Dwell Time: 1:22 minutes  Last Fill Volume: 0ml  Initial Drain Alarm: 10ml  PD orders reviewed with Patient procedure and ESRD teaching done and questions answered.  Cycler ready for hook-up.    Report given to: YISEL Arriola RN

## 2025-01-30 NOTE — PROGRESS NOTES
01/30/25 1550   Appointment Info   Signing Clinician's Name / Credentials (OT) Melanie Rios, OTR/L   Living Environment   People in Home alone   Current Living Arrangements apartment   Home Accessibility no concerns   Transportation Anticipated family or friend will provide   Living Environment Comments Pt lives in apartment w/ elevator access, has tub shower w/ shower chair and grab bars   Self-Care   Usual Activity Tolerance fair   Current Activity Tolerance poor   Equipment Currently Used at Home walker, rolling   Fall history within last six months no   Activity/Exercise/Self-Care Comment Pt reports baseline independence w/ mobility using 4WW. Has PCA who comes 1x/wk to help w/ bathing, independent w/ dressing and toileting   Instrumental Activities of Daily Living (IADL)   IADL Comments Pt reports he has assistance w/ cleaning and RN sets up meds weekly. He does drive but not often.   General Information   Onset of Illness/Injury or Date of Surgery 01/21/25   Referring Physician Milo Carrion MD   Patient/Family Therapy Goal Statement (OT) go to rehab   Additional Occupational Profile Info/Pertinent History of Current Problem Arnulfo Pritchett is a 64 year old male with personal medical history of severe heart failure (EF 30%), ESRD (on peritoneal dialysis), chronic paroxysmal AF on warfarin, LLE PAD s/p endarterectomy, RCC s/p nephrectomy admitted on 1/21/2025 for septic shock secondary to right leg PAD with worsening right heel necrotic ulcer. The patient was seen in ED at outside hospital on 1/3/25 and diagnosed with pneumonia with completed course of Cefdinir. He then presented on 1/21/25 to outside hospital for right leg pain and found to have right leg ulcer and transferred to [facility] for vascular surgery evaluation. Now s/p right common femoral BK popliteal/tibial endarterectomy and right common femoral to below-knee popliteal/tibial PTFE bypass performed on 1/27/25. The patient was admitted to the  ICU for requirement of mechanical ventilation and pressor support following surgery for multifactorial shock.   Existing Precautions/Restrictions fall;weight bearing   Right Lower Extremity (Weight-bearing Status) toe touch weight-bearing (TTWB)   Cognitive Status Examination   Orientation Status person;place   Follows Commands follows one-step commands;over 90% accuracy;delayed response/completion;repetition of directions required;verbal cues/prompting required   Memory Deficit minimal deficit   Cognitive Status Comments Pt oriented x3, some forgetfulness noted and delayed processing. WIll cnotinue to monitor and screen further as indicated   Visual Perception   Visual Impairment/Limitations WFL;corrective lenses full-time   Pain Assessment   Patient Currently in Pain Yes, see Vital Sign flowsheet  (significant pain in RLE)   Range of Motion Comprehensive   Comment, General Range of Motion BUE near WFL   Strength Comprehensive (MMT)   Comment, General Manual Muscle Testing (MMT) Assessment generalized weakness in BUE   Bed Mobility   Bed Mobility supine-sit;sit-supine   Supine-Sit [place]man (Bed Mobility) maximum assist (25% patient effort);2 person assist   Sit-Supine [place] (Bed Mobility) maximum assist (25% patient effort);2 person assist   Transfers   Transfer Comments did not attempt today   Balance   Balance Comments Pt initially needing Mod A EOB, progressing to sit w/ close SBA   Activities of Daily Living   BADL Assessment/Intervention bathing;upper body dressing;lower body dressing;grooming;toileting   Bathing Assessment/Intervention   [place] Level (Bathing) maximum assist (25% patient effort)   Comment, (Bathing) per clinical judgement   Upper Body Dressing Assessment/Training   Comment, (Upper Body Dressing) per clinical judgement   [place] Level (Upper Body Dressing) minimum assist (75% patient effort)   Lower Body Dressing Assessment/Training   [place] Level (Lower Body  Dressing) dependent (less than 25% patient effort)   Grooming Assessment/Training   [place] Level (Grooming) minimum assist (75% patient effort)   Comment, (Grooming) per clinical judgement   Toileting   Comment, (Toileting) per clinical judgement   [place] Level (Toileting) maximum assist (25% patient effort)   Clinical Impression   Criteria for Skilled Therapeutic Interventions Met (OT) Yes, treatment indicated   OT Diagnosis impaired I/ADL independence   OT Problem List-Impairments impacting ADL problems related to;activity tolerance impaired;balance;cognition;mobility;strength;pain;range of motion (ROM);postural control   Assessment of Occupational Performance 5 or more Performance Deficits   Identified Performance Deficits all I/ADL tasks   Planned Therapy Interventions (OT) ADL retraining;IADL retraining;bed mobility training;cognition;strengthening;transfer training;home program guidelines;progressive activity/exercise;risk factor education;ROM   Clinical Decision Making Complexity (OT) problem focused assessment/low complexity   Risk & Benefits of therapy have been explained evaluation/treatment results reviewed;care plan/treatment goals reviewed;risks/benefits reviewed;current/potential barriers reviewed;participants voiced agreement with care plan;patient;participants included   OT Total Evaluation Time   OT Eval, Low Complexity Minutes (38362) 10   OT Goals   Therapy Frequency (OT) 5 times/week   OT Predicted Duration/Target Date for Goal Attainment 02/06/25   OT Goals Upper Body Dressing;Hygiene/Grooming;Lower Body Dressing;Transfers;Toilet Transfer/Toileting;Cognition   OT: Hygiene/Grooming modified independent;while standing;within precautions   OT: Upper Body Dressing Modified independent;including set-up/clothing retrieval;within precautions   OT: Lower Body Dressing Modified independent;including set-up/clothing retrieval;within precautions   OT: Transfer Modified independent;with  assistive device;within precautions   OT: Toilet Transfer/Toileting Modified independent;toilet transfer;cleaning and garment management;using adaptive equipment;within precautions   OT: Cognitive Patient/caregiver will verbalize understanding of cognitive assessment results/recommendations as needed for safe discharge planning   Interventions   Interventions Quick Adds Therapeutic Activity   Therapeutic Activities   Therapeutic Activity Minutes (22479) 15   Symptoms noted during/after treatment increased pain;fatigue  (light headedness)   Treatment Detail/Skilled Intervention Pt in bed, agreeable to therapy w/ some encouragement needed. Pt reporting fatigue, pain in RLE at rest. Inc time for room setup and line mgmt. Supine<>sit w/ Max Ax2, very slow movement of RLE d/t high pain. Pt initially pushing strongly L at EOB (pushing away from pain on RLE), needing Mod A for balance but progressing to sit w/ close SBA. VSS EOB, pt reporting feeling light headed. Pt sat EOB <5 min. Pt not wanting to attempt stand w/ SS despite encouragement. Returned to supine w/ Ax2, total Ax2 to boost to HOB, RLE elevated, alarm on and all needs met at exit. Rn updated.   OT Discharge Planning   OT Plan progress EOB activity as tolerated, monitor cog, trial SS stand?   OT Discharge Recommendation (DC Rec) Transitional Care Facility   OT Rationale for DC Rec Pt below baseline, limited by decreased activity tolerance, weakness, pain, TTWB restriction and some cognitive deficits. Pt wanting to try rehab, would benefit from TCU to progress I/ADL independence.   OT Brief overview of current status Goals of therapy will be to address safe mobility and make recs for d/c to next level of care. Pt and RN will continue to follow all falls risk precautions as documented by RN staff while hospitalized. Ax2 lift w/ nursing staff   OT Total Distance Amb During Session (feet) 0   Total Session Time   Timed Code Treatment Minutes 15   Total Session  Time (sum of timed and untimed services) 25

## 2025-01-30 NOTE — PLAN OF CARE
"Goal Outcome Evaluation:      Plan of Care Reviewed With: patient, family    Overall Patient Progress: improvingOverall Patient Progress: improving     VSS on vent, able to extubate to 1-2 LPM NC this shift. Epi paused this AM, MAP goal achieved. Per Dr. Drew, BP readings obtained via cuff pressures. Pt endorses discomfort to RLE, prn oxycodone given with improvement, scheduled tylenol provided. Bed side swallow eval passed, currently on reg diet/mech soft, pt reports teeth are back at place of residence. Refused OOB activity this shift. Pt independent with IS, aerobika. Hep increased this shift to 1750, HepXa pending. No BM, BS+. Anuric, plan for PD overnight. Pt, family with care conference this afternoon. Per pt, stated to writer and Dr. Farris his wishes to pursue palliative cares. Tele SR. Bed alarms in use, pt not attempting to exit bed. Alert and oriented, able to make needs known. Continue to monitor.       Problem: Adult Inpatient Plan of Care  Goal: Plan of Care Review  Description: The Plan of Care Review/Shift note should be completed every shift.  The Outcome Evaluation is a brief statement about your assessment that the patient is improving, declining, or no change.  This information will be displayed automatically on your shift  note.  Outcome: Progressing  Flowsheets (Taken 1/29/2025 1851)  Plan of Care Reviewed With:   patient   family  Overall Patient Progress: improving  Goal: Patient-Specific Goal (Individualized)  Description: You can add care plan individualizations to a care plan. Examples of Individualization might be:  \"Parent requests to be called daily at 9am for status\", \"I have a hard time hearing out of my right ear\", or \"Do not touch me to wake me up as it startles  me\".  Outcome: Progressing  Goal: Absence of Hospital-Acquired Illness or Injury  Outcome: Progressing  Intervention: Identify and Manage Fall Risk  Recent Flowsheet Documentation  Taken 1/29/2025 1600 by Robbie" MARYSOL Altamirano  Safety Promotion/Fall Prevention:   activity supervised   treat reversible contributory factors   treat underlying cause   supervised activity   safety round/check completed   room near nurse's station   room door open   patient and family education   nonskid shoes/slippers when out of bed   mobility aid in reach   lighting adjusted   increase visualization of patient   increased rounding and observation   clutter free environment maintained   assistive device/personal items within reach  Taken 1/29/2025 1200 by Evelia Avalos RN  Safety Promotion/Fall Prevention:   activity supervised   treat reversible contributory factors   treat underlying cause   supervised activity   safety round/check completed   room near nurse's station   room door open   patient and family education   nonskid shoes/slippers when out of bed   mobility aid in reach   lighting adjusted   increase visualization of patient   increased rounding and observation   clutter free environment maintained   assistive device/personal items within reach  Taken 1/29/2025 0800 by Evelia Avalos, RN  Safety Promotion/Fall Prevention:   activity supervised   treat reversible contributory factors   treat underlying cause   supervised activity   safety round/check completed   room near nurse's station   room door open   patient and family education   nonskid shoes/slippers when out of bed   mobility aid in reach   lighting adjusted   increase visualization of patient   increased rounding and observation   clutter free environment maintained   assistive device/personal items within reach  Intervention: Prevent Skin Injury  Recent Flowsheet Documentation  Taken 1/29/2025 1600 by Evelia Avalos, RN  Body Position:   turned   log-rolled   legs elevated   heels elevated   weight shifting   upper extremity elevated   side-lying 30 degrees   lower extremity elevated   neutral body alignment  Skin Protection:   adhesive use limited   tubing/devices  free from skin contact   skin to device areas padded   silicone foam dressing in place   pulse oximeter probe site changed   incontinence pads utilized  Taken 1/29/2025 1200 by Evelia Avalos RN  Body Position:   turned   log-rolled   legs elevated   heels elevated   weight shifting   upper extremity elevated   side-lying 30 degrees   lower extremity elevated   neutral body alignment  Skin Protection:   adhesive use limited   tubing/devices free from skin contact   skin to device areas padded   silicone foam dressing in place   pulse oximeter probe site changed   incontinence pads utilized  Taken 1/29/2025 1000 by Evelia Avalos RN  Body Position:   turned   log-rolled   legs elevated   heels elevated   weight shifting   upper extremity elevated   side-lying 30 degrees   lower extremity elevated   neutral body alignment  Taken 1/29/2025 0800 by Evelia Avalos RN  Body Position:   turned   log-rolled   legs elevated   heels elevated   weight shifting   upper extremity elevated   side-lying 30 degrees   lower extremity elevated   neutral body alignment  Skin Protection:   adhesive use limited   tubing/devices free from skin contact   skin to device areas padded   silicone foam dressing in place   pulse oximeter probe site changed   incontinence pads utilized  Intervention: Prevent and Manage VTE (Venous Thromboembolism) Risk  Recent Flowsheet Documentation  Taken 1/29/2025 1600 by Evelia Avalos RN  VTE Prevention/Management: SCDs on (sequential compression devices)  Taken 1/29/2025 1200 by Evelia Avalos RN  VTE Prevention/Management: SCDs on (sequential compression devices)  Taken 1/29/2025 0800 by Evelia Avalos RN  VTE Prevention/Management: SCDs on (sequential compression devices)  Intervention: Prevent Infection  Recent Flowsheet Documentation  Taken 1/29/2025 1600 by Evelia Avalos RN  Infection Prevention:   single patient room provided   rest/sleep promoted   hand hygiene promoted    equipment surfaces disinfected   personal protective equipment utilized  Taken 1/29/2025 1200 by Evelia Avalos RN  Infection Prevention:   single patient room provided   rest/sleep promoted   hand hygiene promoted   equipment surfaces disinfected   personal protective equipment utilized  Taken 1/29/2025 0800 by Evelia Avalos RN  Infection Prevention:   single patient room provided   rest/sleep promoted   hand hygiene promoted   equipment surfaces disinfected   personal protective equipment utilized  Goal: Optimal Comfort and Wellbeing  Outcome: Progressing  Intervention: Monitor Pain and Promote Comfort  Recent Flowsheet Documentation  Taken 1/29/2025 1600 by Evelia Avalos RN  Pain Management Interventions:   rest   repositioned   quiet environment facilitated   pillow support provided   care clustered  Taken 1/29/2025 1200 by Evelia Avalos RN  Pain Management Interventions:   rest   repositioned   quiet environment facilitated   pillow support provided   care clustered  Taken 1/29/2025 0908 by Evelia Avalos RN  Pain Management Interventions:   rest   repositioned   quiet environment facilitated   pillow support provided   care clustered  Taken 1/29/2025 0800 by Evelia Avalos RN  Pain Management Interventions:   rest   repositioned   quiet environment facilitated   pillow support provided   care clustered  Intervention: Provide Person-Centered Care  Recent Flowsheet Documentation  Taken 1/29/2025 1600 by Evelia Avalos RN  Trust Relationship/Rapport:   care explained   thoughts/feelings acknowledged   reassurance provided   emotional support provided   choices provided   questions answered  Taken 1/29/2025 1200 by Evelia Avalos RN  Trust Relationship/Rapport:   care explained   thoughts/feelings acknowledged   reassurance provided   emotional support provided   choices provided   questions answered  Taken 1/29/2025 0800 by Evelia Avalos RN  Trust Relationship/Rapport:   care  explained   thoughts/feelings acknowledged   reassurance provided   emotional support provided   choices provided   questions answered  Goal: Readiness for Transition of Care  Outcome: Progressing     Problem: Surgery Nonspecified  Goal: Absence of Bleeding  Outcome: Progressing  Intervention: Monitor and Manage Bleeding  Recent Flowsheet Documentation  Taken 1/29/2025 1600 by Evelia Avalos RN  Bleeding Management: dressing monitored  Taken 1/29/2025 1200 by Evelia Avalos RN  Bleeding Management: dressing monitored  Taken 1/29/2025 0800 by Evelia Avalos RN  Bleeding Management: dressing monitored  Goal: Effective Bowel Elimination  Outcome: Progressing  Intervention: Enhance Bowel Motility and Elimination  Recent Flowsheet Documentation  Taken 1/29/2025 1600 by Evelia Avalos RN  Bowel Motility Enhancement: oral intake encouraged  Bowel Elimination Management: (scheduled meds given)   hygiene measures promoted   other (see comments)  Taken 1/29/2025 1200 by Evelia Avalos RN  Bowel Elimination Management: (scheduled meds given)   hygiene measures promoted   other (see comments)  Taken 1/29/2025 0800 by Evelia Avalos RN  Bowel Elimination Management: (scheduled meds given)   hygiene measures promoted   other (see comments)  Goal: Fluid and Electrolyte Balance  Outcome: Progressing  Intervention: Monitor and Manage Fluid and Electrolyte Balance  Recent Flowsheet Documentation  Taken 1/29/2025 1600 by Evelia Avalos RN  Fluid/Electrolyte Management: fluids provided  Taken 1/29/2025 1200 by Evelia Avalos RN  Fluid/Electrolyte Management: fluids provided  Taken 1/29/2025 0800 by Evelia Avalos RN  Fluid/Electrolyte Management: fluids provided  Goal: Blood Glucose Level Within Targeted Range  Outcome: Progressing  Intervention: Optimize Glycemic Control  Recent Flowsheet Documentation  Taken 1/29/2025 1600 by Evelia Avalos RN  Hyperglycemia Management:   blood glucose monitored    correctional insulin given  Hypoglycemia Management:   blood glucose monitored   insulin dose adjusted  Taken 1/29/2025 1200 by Evelia Avalos RN  Hyperglycemia Management:   blood glucose monitored   correctional insulin given  Hypoglycemia Management: blood glucose monitored  Taken 1/29/2025 0800 by Evelia Avalos RN  Hyperglycemia Management:   blood glucose monitored   correctional insulin given  Hypoglycemia Management: blood glucose monitored  Goal: Absence of Infection Signs and Symptoms  Outcome: Progressing  Intervention: Prevent or Manage Infection  Recent Flowsheet Documentation  Taken 1/29/2025 1600 by Evelia Avalos RN  Infection Management: aseptic technique maintained  Isolation Precautions: contact precautions maintained  Taken 1/29/2025 1200 by Evelia Avalos RN  Infection Management: aseptic technique maintained  Isolation Precautions: contact precautions maintained  Taken 1/29/2025 0800 by Evelia Avalos RN  Infection Management: aseptic technique maintained  Isolation Precautions: contact precautions maintained  Goal: Optimal Pain Control and Function  Outcome: Progressing  Intervention: Prevent or Manage Pain  Recent Flowsheet Documentation  Taken 1/29/2025 1600 by Evelia Avalos RN  Pain Management Interventions:   rest   repositioned   quiet environment facilitated   pillow support provided   care clustered  Taken 1/29/2025 1200 by Evelia Avalos RN  Pain Management Interventions:   rest   repositioned   quiet environment facilitated   pillow support provided   care clustered  Taken 1/29/2025 0908 by Evelia Avalos RN  Pain Management Interventions:   rest   repositioned   quiet environment facilitated   pillow support provided   care clustered  Taken 1/29/2025 0800 by Evelia Avalos RN  Pain Management Interventions:   rest   repositioned   quiet environment facilitated   pillow support provided   care clustered  Goal: Effective Oxygenation and Ventilation  Outcome:  Progressing  Intervention: Optimize Oxygenation and Ventilation  Recent Flowsheet Documentation  Taken 1/29/2025 1600 by Evelia Avalos RN  Cough And Deep Breathing: done independently per patient  Airway/Ventilation Management:   calming measures promoted   airway patency maintained   oxygen therapy provided   position adjusted   pulmonary hygiene promoted  Activity Management: patient refuses activity  Head of Bed (HOB) Positioning: HOB at 30 degrees  Taken 1/29/2025 1500 by Evelia Avalos RN  Activity Management: patient refuses activity  Taken 1/29/2025 1200 by Evelia Avalos RN  Cough And Deep Breathing: unable to perform  Airway/Ventilation Management:   airway patency maintained   pulmonary hygiene promoted   calming measures promoted   oxygen therapy provided   position adjusted  Activity Management:   activity adjusted per tolerance   activity minimized  Head of Bed (HOB) Positioning: HOB at 30 degrees  Taken 1/29/2025 1000 by Evelia Avalos RN  Activity Management:   activity adjusted per tolerance   activity minimized  Head of Bed (HOB) Positioning: HOB at 30 degrees  Taken 1/29/2025 0800 by Evelia Avalos RN  Cough And Deep Breathing: unable to perform  Airway/Ventilation Management:   airway patency maintained   pulmonary hygiene promoted   calming measures promoted   oxygen therapy provided   position adjusted  Activity Management:   activity adjusted per tolerance   activity minimized  Head of Bed (HOB) Positioning: HOB at 30 degrees     Problem: Restraint, Nonviolent  Goal: Absence of Harm or Injury  Outcome: Progressing  Intervention: Implement Least Restrictive Safety Strategies  Recent Flowsheet Documentation  Taken 1/29/2025 1600 by Evelia Avalos RN  Medical Device Protection:   IV pole/bag removed from visual field   tubing secured  Taken 1/29/2025 1200 by Evelia Avalos RN  Medical Device Protection:   IV pole/bag removed from visual field   tubing secured  Taken 1/29/2025  0800 by Evelia Avalos RN  Medical Device Protection:   IV pole/bag removed from visual field   tubing secured  Intervention: Protect Dignity, Rights and Personal Wellbeing  Recent Flowsheet Documentation  Taken 1/29/2025 1600 by Evelia Avalos RN  Trust Relationship/Rapport:   care explained   thoughts/feelings acknowledged   reassurance provided   emotional support provided   choices provided   questions answered  Taken 1/29/2025 1200 by Evelia Avalos RN  Trust Relationship/Rapport:   care explained   thoughts/feelings acknowledged   reassurance provided   emotional support provided   choices provided   questions answered  Taken 1/29/2025 0800 by Evelia Avalos RN  Trust Relationship/Rapport:   care explained   thoughts/feelings acknowledged   reassurance provided   emotional support provided   choices provided   questions answered  Intervention: Protect Skin and Joint Integrity  Recent Flowsheet Documentation  Taken 1/29/2025 1600 by Evelia Avalos RN  Body Position:   turned   log-rolled   legs elevated   heels elevated   weight shifting   upper extremity elevated   side-lying 30 degrees   lower extremity elevated   neutral body alignment  Skin Protection:   adhesive use limited   tubing/devices free from skin contact   skin to device areas padded   silicone foam dressing in place   pulse oximeter probe site changed   incontinence pads utilized  Taken 1/29/2025 1200 by Evelia Avalos RN  Body Position:   turned   log-rolled   legs elevated   heels elevated   weight shifting   upper extremity elevated   side-lying 30 degrees   lower extremity elevated   neutral body alignment  Skin Protection:   adhesive use limited   tubing/devices free from skin contact   skin to device areas padded   silicone foam dressing in place   pulse oximeter probe site changed   incontinence pads utilized  Taken 1/29/2025 1000 by Evelia Avalos RN  Body Position:   turned   log-rolled   legs elevated   heels  elevated   weight shifting   upper extremity elevated   side-lying 30 degrees   lower extremity elevated   neutral body alignment  Taken 1/29/2025 0800 by Evelia Avalos RN  Body Position:   turned   log-rolled   legs elevated   heels elevated   weight shifting   upper extremity elevated   side-lying 30 degrees   lower extremity elevated   neutral body alignment  Skin Protection:   adhesive use limited   tubing/devices free from skin contact   skin to device areas padded   silicone foam dressing in place   pulse oximeter probe site changed   incontinence pads utilized     Problem: Risk for Delirium  Goal: Optimal Coping  Outcome: Progressing  Intervention: Optimize Psychosocial Adjustment to Delirium  Recent Flowsheet Documentation  Taken 1/29/2025 1600 by Evelia Avalos, RN  Family/Support System Care:   self-care encouraged   involvement promoted  Taken 1/29/2025 1200 by Evelia Avalos, RN  Family/Support System Care: self-care encouraged  Taken 1/29/2025 0800 by Evelia Avalos, RN  Family/Support System Care: self-care encouraged  Goal: Improved Behavioral Control  Outcome: Progressing  Intervention: Minimize Safety Risk  Recent Flowsheet Documentation  Taken 1/29/2025 1600 by Evelia Avalos, RN  Enhanced Safety Measures:   room near unit station   review medications for side effects with activity   Pre/Post Op education on fall prevention   patient/family teach back on injury risk   pain management   monitor patients coagulation values   assistive devices when indicated  Trust Relationship/Rapport:   care explained   thoughts/feelings acknowledged   reassurance provided   emotional support provided   choices provided   questions answered  Taken 1/29/2025 1200 by Evelia Avalos, RN  Enhanced Safety Measures:   room near unit station   review medications for side effects with activity   Pre/Post Op education on fall prevention   patient/family teach back on injury risk   pain management   monitor  patients coagulation values   assistive devices when indicated  Trust Relationship/Rapport:   care explained   thoughts/feelings acknowledged   reassurance provided   emotional support provided   choices provided   questions answered  Taken 1/29/2025 0800 by Evelia Avalos RN  Enhanced Safety Measures:   room near unit station   review medications for side effects with activity   Pre/Post Op education on fall prevention   patient/family teach back on injury risk   pain management   monitor patients coagulation values   assistive devices when indicated  Trust Relationship/Rapport:   care explained   thoughts/feelings acknowledged   reassurance provided   emotional support provided   choices provided   questions answered  Goal: Improved Attention and Thought Clarity  Outcome: Progressing  Intervention: Maximize Cognitive Function  Recent Flowsheet Documentation  Taken 1/29/2025 1600 by Evelia Avalos, RN  Sensory Stimulation Regulation:   care clustered   music on   television on  Reorientation Measures:   glasses use encouraged   calendar in view   clock in view   reorientation provided  Taken 1/29/2025 1200 by Evelia Avalos, RN  Sensory Stimulation Regulation:   care clustered   music on   television on  Reorientation Measures:   glasses use encouraged   calendar in view   clock in view   reorientation provided  Taken 1/29/2025 0800 by Evelia Avalos, RN  Sensory Stimulation Regulation:   care clustered   music on   television on  Reorientation Measures:   glasses use encouraged   calendar in view   clock in view   reorientation provided  Goal: Improved Sleep  Outcome: Progressing

## 2025-01-30 NOTE — PROGRESS NOTES
Vascular Surgery Progress Note  01/30/2025       Subjective:  - Extubated yesterday, now on minimal NC. Remains on Epi gtt. Pain in foot better compared to before surgery. WBC elevated but no fevers overnight.     Objective:  Temp:  [98.3  F (36.8  C)-98.5  F (36.9  C)] 98.3  F (36.8  C)  Pulse:  [] 105  Resp:  [9-24] 19  BP: ()/(50-78) 105/72  MAP:  [59 mmHg-81 mmHg] 65 mmHg  Arterial Line BP: ()/(45-64) 91/54  SpO2:  [90 %-99 %] 98 %    I/O last 3 completed shifts:  In: 1822.22 [I.V.:1052.22; NG/GT:650]  Out: -       Gen: Awake, alert, NAD  Resp: NLB on NC  Abd: soft, nondistended, nontender, groin incision c/d/i  Incision: c/d/I  Ext: WWP, no edema, monophasic AT signal     Labs:  Recent Labs   Lab 01/30/25  0429 01/29/25  0355 01/28/25  1347   WBC 23.2* 14.1* 16.9*   HGB 8.6* 8.4* 9.5*    281 331       Recent Labs   Lab 01/30/25  1120 01/30/25  0749 01/30/25  0431 01/30/25  0429 01/29/25  0357 01/29/25  0355 01/28/25  0429 01/28/25  0425   NA  --   --   --  132*  --  133*  --  134*   POTASSIUM  --   --   --  4.0  --  4.4  --  5.2   CHLORIDE  --   --   --  94*  --  95*  --  98   CO2  --   --   --  22  --  20*  --  21*   BUN  --   --   --  60.0*  --  56.7*  --  51.3*   CR  --   --   --  7.06*  --  7.69*  --  7.65*   * 295* 292* 348*   < > 326*   < > 208*   TERENCE  --   --   --  9.2  --  9.6  --  9.6   MAG  --   --   --  1.9  --  2.0  --  1.7   PHOS  --   --   --  6.0*  --  7.9*  --  8.0*    < > = values in this interval not displayed.       Imaging:  XR Chest Port 1 View   Final Result   IMPRESSION: ET tube, enteric tube, and right IJ central venous catheter are unchanged in position. The heart is enlarged, similar prior study. There is central pulmonary venous congestion with a moderate right-sided effusion which layers posteriorly.    Perihilar Interstitial edema is also noted      XR Abdomen Port 1 View   Final Result   IMPRESSION: Enteric tube has been advanced, tip in the gastric  antrum, satisfactory positioning. Postoperative changes in the abdomen. Residual contrast material in the colon. Atherosclerotic changes. Right pleural effusion with associated passive    atelectasis in the adjacent right base.      XR Chest Port 1 View   Final Result   IMPRESSION: Interval placement of endotracheal tube, tip 4.8 cm above the suki. Right IJ catheter has been placed, tip in the SVC. Enteric tube has been placed, tip and side-port below the diaphragm. Moderately large right pleural effusion with    associated passive atelectasis in the adjacent right lung, unchanged. Calcified granuloma left apex. Minor strands of linear atelectasis left base. No effusion on the left. Normal cardiac size. Coronary artery stent. Mild venous congestion.    Atherosclerotic thoracic aorta. Degenerative changes thoracic spine. Previously healed bilateral rib fracture deformities. Monitoring leads overlying the chest.      XR Abdomen Port 1 View   Final Result   IMPRESSION: Orogastric tube tip in the stomach. The sidehole is just below the GE junction.      XR Surgery MOODY L/T 5 Min Fluoro w Stills   Final Result      XR Chest Port 1 View   Final Result   IMPRESSION: Moderate-large right pleural effusion, increased compared to prior. Clear left lung and pleural space. Stable enlarged cardiomediastinal silhouette. No pneumothorax.      MR Ankle Right w/o Contrast   Final Result   IMPRESSION:   1.  There is a soft tissue ulceration over the posterior aspect of the hindfoot with some surrounding edema or cellulitis but no evidence for abscess.   2.  No evidence for osteomyelitis.   3.  Small tibiotalar joint effusion.   4.  No evidence for fracture.   5.  Acute on chronic plantar fasciitis. Plantar calcaneal spurring.   6.  No additional tendinous or ligamentous pathology.            US Thoracentesis   Final Result   IMPRESSION:   Status post right ultrasound-guided thoracentesis.         IR Lower Extremity Angiogram Right    Final Result      CTA Abdomen Pelvis Runoff w Contrast   Final Result   IMPRESSION:   1.  Occluded distal right superficial femoral artery and an popliteal artery as above including stents in the SFA and popliteal arteries. The tibial arteries are difficult to evaluate due to poor flow and calcified plaque. The distal anterior tibial and    peroneal arteries are patent.   2.  Patent left common femoral artery and common femoral artery to tibioperoneal trunk bypass graft without evidence for significant stenosis.   3.  Large right pleural effusion and small left pleural effusion.   4.  Ascites most likely relates to a peritoneal dialysis catheter.   5.  Colonic diverticulosis.      XR Calcaneus Right G/E 2 Views   Final Result   IMPRESSION: Soft tissue ulcer plantar to the calcaneus. There is diffuse demineralization without discrete erosion. MRI would be more sensitive for early findings of osteomyelitis. Plantar calcaneal spur. Achilles enthesophyte. Vascular calcifications.      US Lower Extremity Venous Mapping Right   Final Result   IMPRESSION:   1.  Right great saphenous vein measurements as described, relatively small diameters. Areas of echogenic walls, may relate to chronic superficial thrombophlebitis.      US Upper Extremity Venous Mapping Bilateral   Final Result   IMPRESSION: Bilateral upper extremity vein measurements as described. Both cephalic veins are relatively small in caliber.         US Lower Extremity Arterial Duplex Right   Final Result   IMPRESSION:   1.  No blood flow demonstrated in the distal superficial femoral, popliteal, and runoff arteries. There may be trickle blood flow in the distal runoff arteries, though difficult to distinguish from artifact.      2.  Uncertain if stent is in the right lower extremity. Previous angiogram images are not available for review.              Assessment/Plan:   64 year old male who is POD3 s/p R fem-BK pop bypass with PTFE and proximal short GSV  interposition graft. Extubated POD2 but remains on pressor.    - Echo ordered to evaluate whether decreased heart function could be contributing to persistent pressor requirement. If any new abnormalities, will consider cardiology consult  - Aggressive pulmonary toileting  - If fevers, will broaden antibiotics from zosyn  - Asa/heparin gtt  - Appreciate input from ICU and podiatry teams. Will likely still need debridement of R heel at some point     Seen, examined, and discussed with Vascular Fellow, who will discuss with staff.  - - - - - - - - - - - - - - - - - -  Milo Carrion MD  Vascular Surgery Resident

## 2025-01-30 NOTE — PROGRESS NOTES
"PALLIATIVE CARE PROGRESS NOTE  [facility]     Patient Name: Arnulfo Pritchett  Date of Admission: 1/21/2025   Today the patient was seen for: goals of care, advanced care planning.     Recommendations & Counseling       GOALS OF CARE:   Restorative with limits   Benefits vs Burdens of CPR discussed today. Code status changed to No CPR and No intubation today after review with Arnulfo and his son Ervin (who was on speaker phone).  Arnulfo wants to continue current cares/treatments with goal to discharge to TCU and see how things go there. Arnulfo expressed that \"I think you are trying to talk me into palliative care.\" I explained that my goal was educate him so he can make well informed choices for himself.   Arnulfo expressed hopes that he will be able to return to his apartment again but we discussed that this is not likely to happen as he will be needing much more assistance after this hospitalization. Arnulfo is agreeable to to moving where he can receive more assistance.   Palliative care will sign off at this time but we can be paged if further needs are identified.    ADVANCE CARE PLANNING:  Ervin said he had sent the directive to me so I could send to honoring choices for approval but I did not receive the email- he will attempt again.  There is no POLST form on file, defer to patient and/or next of kin for decisions   Code status: No CPR- Do NOT Intubate    DECISION MAKING:  Patient's decision making preferences: shared with support from loved ones  Patient has decision-making capacity today for complex decisions:Questionable he is currently intubated and fatigued-is intermittently engaged.    MEDICAL MANAGEMENT:   We are not actively managing symptoms at this time.    Risk for Delirium- multifactorial, related to infections, respiratory compromise, ICU,  medications. Improved today.   -Maintain day/night routines and orientation.  -Avoid medications such as steroids, benzodiazepines and " "anticholinergics.   -Optimize hydration/nutrition, and sleep.  -Utilize sensory aids to maintain orientation such as eye glasses, hearing aids or pocket talkers.  -Calm environment, quiet/reassuring voices, orienting cues, minimized noise/night disruptions, when possible.   -Maximize mobility and prevent/treat pain.      PSYCHOSOCIAL/SPIRITUAL SUPPORT:  Supportive family members- 2 daughters and a son who assist Arnulfo often.    Palliative Care will not continue to follow. Thank you for the consult and allowing us to aid in the care of Arnulfo Pritchett.    Discussed patient case and goals of care discussion with ICU fellow    JONATHAN Dumont  Securely message with Vocera (more info)  Text page via [employer] Paging/Directory       Chart documentation was completed, in part, with [employer] voice-recognition software. Even though reviewed, some grammatical, spelling, and word errors may remain.         Interval History:     Writer met with patient this morning.  He was alert and able to converse freely.  He remembers our  conversation from yesterday.  He is very thankful that he was extubated and said he would \"never want to go through that again\".  We called his son Ervin on the phone for an ongoing goals of care discussion now that patient is able to participate. We reviewed the benefits vs burdens of CPR and decision was made to change Code status to No CPR and No intubation today after review with Arnulfo and his son Ervin (who was on speaker phone). Arnulfo stated  that he would not want to be kept alive by machines if he could not interact or know what was going on around him.      We reviewed concerns regarding his ability to remain living independently after this hospitalization as he required help with his peritoneal dialysis as he could not lift the heavy fluid bags.  We discussed that family and friends would likely not be able to provide the amount of assistance that he would need to live independently in an apartment " "on a daily basis.  Patient was agreeable to finding a place where he would be able to receive more assistance.     Arnulfo wants to continue current cares/treatments with goal to discharge to TCU and see how things go from there. Arnulfo expressed that \"I think you are trying to talk me into palliative care.\" I explained that my hope was educate him so he can make well informed choices for himself.     Palliative care will sign off at this time but we can be paged if further needs are identified.    Advance Care Planning Discussion 1/30/2025. [facility] CNS met with Patient and their family today at the hospital to discuss Advance Care Planning. Arnulfo Pritchett does have decisional capacity and was present for this discussion.  Those present were informed of the voluntary nature of this discussion and wished to proceed.  The discussion included: See documentation in Serious Illness Conversation section of the ACP Activity. This discussion began at 1030 and ended at 1100 for a total of 30 minutes.      Assessment          Arnulfo Pritchett is a 64 year old male with a past medical history of renal cell carcinoma s/p right nephrectomy, ESRD on peritoneal dialysis, gout, peripheral artery disease with critical resting limb arterial insufficiency of right LE,  left common femoral endarterectomy and a left common femoral artery to tibioperoneal trunk bypass  10/2024, chronic paroxysmal A-fib, CAD s/p multiple stents, anemia, TALI, and recent pneumonia who presented to [facility] ED on 1/21/2025 with leg pain and shortness of breath.       Upon further evaluation he was found to have a moderate to large right pleural effusion s/p thoracentesis 1/24, right leg and right heel gangrene/ulcer and occluded right SFA stent.  The plan is for patient to undergo R fem-BK pop bypass with PTFE, angiogram, and possible debridement of R heel this afternoon.     Previous hospitalizations:  12/05-07: Hypotension.  11/27 - 12/03: " NSTEMI versus nonischemic MRI due to/hypotension            Review of Systems:     Besides above, ROS was reviewed and is unremarkable           Physical Exam:   Temp:  [98.3  F (36.8  C)-98.5  F (36.9  C)] 98.3  F (36.8  C)  Pulse:  [] 105  Resp:  [9-24] 19  BP: ()/(50-78) 105/72  MAP:  [59 mmHg-81 mmHg] 65 mmHg  Arterial Line BP: ()/(45-64) 91/54  FiO2 (%):  [30 %] 30 %  SpO2:  [90 %-99 %] 98 %  173 lbs .98 oz    Physical Exam  GENERAL:  Alert, fatigued, no distress, intubated, frail weak, appears comfortable. HEAD: Normocephalic atraumatic  SCLERA: Anicteric  ABDOMEN:  Non distended  RESPIRATORY: Breathing via ET tube.   NEUROLOGIC: intermittently alert  PSYCH: Calm             Current Problem List:   Active Problems:    Cellulitis      No Known Allergies         Data Reviewed:     Results for orders placed or performed during the hospital encounter of 01/21/25 (from the past 24 hours)   Glucose by meter   Result Value Ref Range    GLUCOSE BY METER POCT 152 (H) 70 - 99 mg/dL   Glucose by meter   Result Value Ref Range    GLUCOSE BY METER POCT 125 (H) 70 - 99 mg/dL   Heparin Unfractionated Anti Xa Level   Result Value Ref Range    Anti Xa Unfractionated Heparin 0.33 For Reference Range, See Comment IU/mL    Narrative    Therapeutic Range: UFH: 0.25-0.50 IU/mL for low intensity dosing,  0.30-0.70 IU/mL for high intensity dosing DVT and PE.  This test is not validated for other direct factor X inhibitors (e.g. rivaroxaban, apixaban, edoxaban, betrixaban, fondaparinux) and should not be used for monitoring of other medications.   Glucose by meter   Result Value Ref Range    GLUCOSE BY METER POCT 154 (H) 70 - 99 mg/dL   Glucose by meter   Result Value Ref Range    GLUCOSE BY METER POCT 249 (H) 70 - 99 mg/dL   Heparin Unfractionated Anti Xa Level   Result Value Ref Range    Anti Xa Unfractionated Heparin 0.16 For Reference Range, See Comment IU/mL    Narrative    Therapeutic Range: UFH: 0.25-0.50  IU/mL for low intensity dosing,  0.30-0.70 IU/mL for high intensity dosing DVT and PE.  This test is not validated for other direct factor X inhibitors (e.g. rivaroxaban, apixaban, edoxaban, betrixaban, fondaparinux) and should not be used for monitoring of other medications.   Basic metabolic panel   Result Value Ref Range    Sodium 132 (L) 135 - 145 mmol/L    Potassium 4.0 3.4 - 5.3 mmol/L    Chloride 94 (L) 98 - 107 mmol/L    Carbon Dioxide (CO2) 22 22 - 29 mmol/L    Anion Gap 16 (H) 7 - 15 mmol/L    Urea Nitrogen 60.0 (H) 8.0 - 23.0 mg/dL    Creatinine 7.06 (H) 0.67 - 1.17 mg/dL    GFR Estimate 8 (L) >60 mL/min/1.73m2    Calcium 9.2 8.8 - 10.4 mg/dL    Glucose 348 (H) 70 - 99 mg/dL   CBC with platelets   Result Value Ref Range    WBC Count 23.2 (H) 4.0 - 11.0 10e3/uL    RBC Count 2.72 (L) 4.40 - 5.90 10e6/uL    Hemoglobin 8.6 (L) 13.3 - 17.7 g/dL    Hematocrit 26.2 (L) 40.0 - 53.0 %    MCV 96 78 - 100 fL    MCH 31.6 26.5 - 33.0 pg    MCHC 32.8 31.5 - 36.5 g/dL    RDW 18.6 (H) 10.0 - 15.0 %    Platelet Count 306 150 - 450 10e3/uL   Magnesium   Result Value Ref Range    Magnesium 1.9 1.7 - 2.3 mg/dL   Phosphorus   Result Value Ref Range    Phosphorus 6.0 (H) 2.5 - 4.5 mg/dL   Glucose by meter   Result Value Ref Range    GLUCOSE BY METER POCT 292 (H) 70 - 99 mg/dL   Glucose by meter   Result Value Ref Range    GLUCOSE BY METER POCT 295 (H) 70 - 99 mg/dL   Heparin Unfractionated Anti Xa Level   Result Value Ref Range    Anti Xa Unfractionated Heparin 0.28 For Reference Range, See Comment IU/mL    Narrative    Therapeutic Range: UFH: 0.25-0.50 IU/mL for low intensity dosing,  0.30-0.70 IU/mL for high intensity dosing DVT and PE.  This test is not validated for other direct factor X inhibitors (e.g. rivaroxaban, apixaban, edoxaban, betrixaban, fondaparinux) and should not be used for monitoring of other medications.         Medical Decision Making       Please see A&P for additional details of medical decision  making.  MANAGEMENT DISCUSSED with the following over the past 24 hours: texted ICU Fellow Chio Mancera   NOTE(S)/MEDICAL RECORDS REVIEWED over the past 24 hours: Medicine, Vascular Surgery, ICU, nephrology, podiatry  Tests REVIEWED in the past 24 hours:  - See lab/imaging results included in the data section of the note  SUPPLEMENTAL HISTORY, in addition to the patient's history, over the past 24 hours obtained from:   - roxie Mueller  Medical complexity over the past 24 hours:  - Decision to DE-ESCALATE CARE based on prognosis

## 2025-01-30 NOTE — PROGRESS NOTES
[facility]  PROGRESS NOTE - UPDATE     Right ankle MRI:   IMPRESSION:  1.  There is a soft tissue ulceration over the posterior aspect of the hindfoot with some surrounding edema or cellulitis but no evidence for abscess.  2.  No evidence for osteomyelitis.  3.  Small tibiotalar joint effusion.  4.  No evidence for fracture.  5.  Acute on chronic plantar fasciitis. Plantar calcaneal spurring.  6.  No additional tendinous or ligamentous pathology.    I personally reviewed the images and agree with the reports as stated.    ASSESSMENT:  Right heel gangrene --> no osteomyelitis   Peripheral Arterial Disease s/p right fem - BK pop bypass  Diabetes Mellitus --> hba1c: 5.7     MEDICAL DECISION MAKING:   -Chart reviewed. Patient undergoing conversations with palliative care regarding ongoing goals of care.   -No surgical plans per podiatry for the right heel. Recommend offloading at all times while in bed. Can toe touch WB on RLE when out of bed.   -Dressing changes per WOC orders.   -Will sign off. [employer] text with questions/concerns.     Alyce Salas DPM   [place] Department of Podiatry/Foot & Ankle Surgery  Available via [employer] Text

## 2025-01-30 NOTE — PROGRESS NOTES
Renal Medicine Progress Note            Assessment/Plan:     Arnulfo Pritchett is a 64 year old male CAD, HTN, HLD, pafib, HFrEF (EF 20-25%), ESRD on PD, DM2, TALI, RCC s/p R nephrectomy (2022), PAD with multiple LE procedures      # End stage renal disease on PD  Chronic PD for last 3.5 years.  Home unit [facility], nephrologist Dr. May  Rx: 5 cycles, 2 L fill volumes, 9 hours, last fill 1.2 L with external.     # PAD, right heel gangrene, occluded right SFA                              S/P : 1.   Right common femoral and below-knee popliteal/tibial endarterectomy                        2. Right common femoral to below-knee popliteal/tibial composite greater saphenous vein-6 mm   ringed PTFE Propaten bypas  Other issues-  Diabetes mellitus  Chronic paroxysmal atrial fibrillation  Secondary hyperparathyroidism and hyperphosphatemia  Severe heart failure with EF of 30% and mild RV dysfunction.      Plan:  #  PD order placed. Will use all 2.5% dextrose.        Interval History:     Afebrile.   Leukocytosis  Patient is extubated.  He looks comfortable sitting in the chair.  He asks if we are using green or yellow bags.    Total UF ~ 1 liters with PD last night.          Medications and Allergies:     Current Facility-Administered Medications   Medication Dose Route Frequency Provider Last Rate Last Admin    - MEDICATION INSTRUCTIONS for Dialysis Patients -   Does not apply See Admin Instructions Melissa Macias MD        acetaminophen (TYLENOL) tablet 975 mg  975 mg Oral or Feeding Tube Q8H Enedina Osman MD   975 mg at 01/30/25 1132    Or    acetaminophen (TYLENOL) Suppository 650 mg  650 mg Rectal Q8H Enedina Osman MD        [Held by provider] allopurinol (ZYLOPRIM) tablet 200 mg  200 mg Oral QPM Milo Carrion MD   200 mg at 01/26/25 1957    atorvastatin (LIPITOR) tablet 40 mg  40 mg Oral or Feeding Tube At Bedtime Melissa Macias MD   40 mg at 01/29/25 2036     calcitRIOL (ROCALTROL) capsule 0.25 mcg  0.25 mcg Oral At Bedtime Melissa Macias MD   0.25 mcg at 01/29/25 2046    cinacalcet (SENSIPAR) tablet 60 mg  60 mg Oral Q Mon Wed Fri AM Milo Carrion MD   60 mg at 01/29/25 2035    clopidogrel (PLAVIX) tablet 75 mg  75 mg Oral or Feeding Tube QPM Melissa Macias MD   75 mg at 01/29/25 2035    insulin aspart (NovoLOG) injection (RAPID ACTING)  1-7 Units Subcutaneous Q4H Patricia Dykes APRN CNP   3 Units at 01/30/25 1133    insulin glargine (LANTUS PEN) injection 15 Units  15 Units Subcutaneous Daily Enedina Osman MD   15 Units at 01/30/25 1132    [Held by provider] methocarbamol (ROBAXIN) tablet 750 mg  750 mg Oral or NG Tube 4x Daily Milo Carrion MD        [Held by provider] metoprolol succinate ER (TOPROL XL) 24 hr tablet 25 mg  25 mg Oral Daily Milo Carrion MD   25 mg at 01/27/25 0848    multivitamin RENAL (TRIPHROCAPS) capsule 1 capsule  1 capsule Oral Daily Melissa Macias MD   1 capsule at 01/30/25 0802    pantoprazole (PROTONIX) 2 mg/mL suspension 40 mg  40 [facility] Patricia Dykes APRN CNP   40 mg at 01/28/25 0834    Or    pantoprazole (PROTONIX) IV push injection 40 mg  40 mg Intravenous [facility] Patricia Dykes APRN CNP   40 mg at 01/30/25 0754    piperacillin-tazobactam (ZOSYN) 2.25 g vial to attach to  ml bag  2.25 g Intravenous Q8H Patricia Dykes APRN CNP   2.25 g at 01/30/25 1130    polyethylene glycol (MIRALAX) Packet 17 g  17 g Oral or NG Tube Daily Milo Carrion MD   17 g at 01/29/25 0816    Prosource TF20 ENfit Compatibl EN LIQD (PROSOURCE TF20) packet 60 mL  1 packet Per Feeding Tube BID Junior Chowdhury MD   60 mL at 01/29/25 0826    senna-docusate (SENOKOT-S/PERICOLACE) 8.6-50 MG per tablet 1 tablet  1 tablet Oral or NG Tube BID Milo Carrion MD   1 tablet at 01/29/25 2036    sevelamer carbonate (RENVELA) tablet 800 mg  800 mg Oral TID w/meals Star Acuna  MD Angel   800 mg at 01/30/25 1132    sodium chloride (PF) 0.9% PF flush 3 mL  3 mL Intracatheter Q8H Milo Carrion MD   3 mL at 01/30/25 1132      No Known Allergies         Physical Exam:   Vitals were reviewed   , Blood pressure 105/72, pulse 105, temperature 98.3  F (36.8  C), temperature source Axillary, resp. rate 19, weight 78.5 kg (173 lb 1 oz), SpO2 98%.    Wt Readings from Last 3 Encounters:   01/29/25 78.5 kg (173 lb 1 oz)   01/21/25 75.8 kg (167 lb)   01/03/25 78.9 kg (174 lb)       Intake/Output Summary (Last 24 hours) at 1/30/2025 1201  Last data filed at 1/30/2025 1000  Gross per 24 hour   Intake 1329.05 ml   Output 1017 ml   Net 312.05 ml     GENERAL APPEARANCE: Looks comfortable sitting in the chair.  HEENT:  Eyes/ears/nose/neck grossly normal.   RESP: lungs cta b c good efforts, no crackles, rhonchi or wheezes  CV: RRR  ABDOMEN: soft, NT  EXTREMITIES/SKIN: No edema  NEURO: Awake, alert and following verbal commands           Data:     CBC RESULTS:     Recent Labs   Lab 01/30/25  0429 01/29/25  0355 01/28/25  1347 01/28/25  0425 01/27/25  2226 01/27/25  1852   WBC 23.2* 14.1* 16.9* 16.7* 15.3* 14.4*   RBC 2.72* 2.74* 3.06* 2.99* 2.97* 2.75*   HGB 8.6* 8.4* 9.5* 9.2* 9.1* 8.6*   HCT 26.2* 26.4* 29.3* 28.3* 28.0* 26.1*    281 331 308 314 330       Basic Metabolic Panel:  Recent Labs   Lab 01/30/25  1120 01/30/25  0749 01/30/25  0431 01/30/25  0429 01/29/25  2348 01/29/25  2030 01/29/25  0357 01/29/25  0355 01/28/25  0429 01/28/25 0425 01/27/25 2241 01/27/25 2230 01/27/25  2034 01/27/25  1852 01/27/25  1618   NA  --   --   --  132*  --   --   --  133*  --  134*  --  135  --  135 133*   POTASSIUM  --   --   --  4.0  --   --   --  4.4  --  5.2  --  4.8  --  4.3 4.5   CHLORIDE  --   --   --  94*  --   --   --  95*  --  98  --  98  --  97* 95*   CO2  --   --   --  22  --   --   --  20*  --  21*  --  20*  --  21* 24   BUN  --   --   --  60.0*  --   --   --  56.7*  --  51.3*  --  48.6*  --  48.5*  48.7*   CR  --   --   --  7.06*  --   --   --  7.69*  --  7.65*  --  7.38*  --  7.24* 7.43*   * 295* 292* 348* 249* 154*   < > 326*   < > 208*   < > 155*   < > 81 92   TERENCE  --   --   --  9.2  --   --   --  9.6  --  9.6  --  9.5  --  9.4 9.9    < > = values in this interval not displayed.       INR  Recent Labs   Lab 01/27/25  2230 01/23/25  1517   INR 1.77* 1.46*      Attestation:   I have reviewed today's relevant vital signs, notes, medications, labs and imaging.    Star Acuna MD  [facility] - Nephrology  Office phone :[phone]  Pager: [phone]

## 2025-01-30 NOTE — PROGRESS NOTES
"   01/30/25 1600   Appointment Info   Signing Clinician's Name / Credentials (PT) Kristen Weldon, PT, DPT   Rehab Comments (PT) TTWB R LE   Living Environment   People in Home alone   Current Living Arrangements apartment   Home Accessibility no concerns   Transportation Anticipated family or friend will provide   Self-Care   Usual Activity Tolerance fair   Current Activity Tolerance poor   Equipment Currently Used at Home walker, rolling   Activity/Exercise/Self-Care Comment Pt reports baseline independence w/ mobility using 4WW. Has PCA who comes 1x/wk to help w/ bathing, independent w/ dressing and toileting   General Information   Onset of Illness/Injury or Date of Surgery 01/27/25   Referring Physician Enedina Osman MD   Patient/Family Therapy Goals Statement (PT) to feel better   Pertinent History of Current Problem (include personal factors and/or comorbidities that impact the POC) \"Arnulfo Pritchett IS a 64 year old male admitted on 1/21/2025 for septic shock secondary to right leg PAD with worsening right heel necrotic ulcer now s/p right popliteal endarterectomy with PTFE bypass on 1/27/25 requiring mechanical ventilation and pressure support for multifactorial shock following surgery. \"   Existing Precautions/Restrictions fall   Weight-Bearing Status - LLE weight-bearing as tolerated   Weight-Bearing Status - RLE (S)  toe touch weight-bearing   Cognition   Affect/Mental Status (Cognition) WFL   Pain Assessment   Patient Currently in Pain Yes, see Vital Sign flowsheet  (R LE/foot)   Integumentary/Edema   Integumentary/Edema Comments WOC following, per chart 1/28: \"Unstageable pressure injuries to right heel and right lateral malleolus, present on admission. Vascular surgery and Podiatry also following. No plan for surgical intervention at this time.\"   Posture    Posture Forward head position   Range of Motion (ROM)   Range of Motion ROM deficits secondary to pain   Strength (Manual Muscle Testing) "   Strength Comments weakness globally, functionally affected by pain   Bed Mobility   Comment, (Bed Mobility) max assist of 2   Transfers   Comment, (Transfers) ilda lift   Gait/Stairs (Locomotion)   Comment, (Gait/Stairs) unable   Balance   Balance Comments fair seated balance   Sensory Examination   Sensory Perception Comments neuropathy   Clinical Impression   Criteria for Skilled Therapeutic Intervention Yes, treatment indicated   PT Diagnosis (PT) impaired functional mobility   Influenced by the following impairments decreased strength, balance, and activity tolerance   Functional limitations due to impairments bed mobility, transfers, gait   Clinical Presentation (PT Evaluation Complexity) evolving   Clinical Presentation Rationale clinical judgement   Clinical Decision Making (Complexity) moderate complexity   Planned Therapy Interventions (PT) balance training;bed mobility training;gait training;home exercise program;neuromuscular re-education;patient/family education;stair training;strengthening;transfer training   Risk & Benefits of therapy have been explained evaluation/treatment results reviewed;care plan/treatment goals reviewed;risks/benefits reviewed;current/potential barriers reviewed;participants voiced agreement with care plan;participants included;patient   PT Total Evaluation Time   PT Eval, Moderate Complexity Minutes (93387) 10   Physical Therapy Goals   PT Frequency 5x/week   PT Predicted Duration/Target Date for Goal Attainment 02/06/25   PT Goals Bed Mobility;Transfers;Gait   PT: Bed Mobility Supervision/stand-by assist   PT: Transfers Supervision/stand-by assist;Sit to/from stand;Bed to/from chair;Assistive device;Within precautions   PT: Gait Minimal assist;10 feet;Within precautions;Assistive device   Interventions   Interventions Quick Adds Therapeutic Activity   Therapeutic Activity   Therapeutic Activities: dynamic activities to improve functional performance Minutes (88265) 15    Symptoms Noted During/After Treatment Fatigue;Increased pain   Treatment Detail/Skilled Intervention cotx d/t decreased tolerance. Pt agreeable to try. Supine to seated EOB with max assist of 2, cuing throughout. Needs time and gentleness with R LE d/t high pain. half-way up wanted to give up, but able to keep going with encouragement. Max assist once sitting, as pt pushing away from painful leg, ultimately able to sit with CGA once positioned better and sitting evenly with help to position. Brook clarencedy available, but pt not willing to try even sitting in it with hands on the bar. Sat for ~5 minutes, then back to supine with max assist of 2, total assist supine scoot. Set up with rooke boot R   PT Discharge Planning   PT Plan continue mobility as able   PT Discharge Recommendation (DC Rec) Transitional Care Facility   PT Rationale for DC Rec Patient demonstrates decreased functional mobility compared to baseline. Typpically is independent, currenly significantly limited by pain and decreased activity tolerance. Will benefit from rehab to maximize safety and independence.   PT Brief overview of current status ilda lift transfers; max assist of 2 bed mob  (Goals of therapy will be to address safe mobility and make recs for d/c to next level of care. Pt and RN will continue to follow all falls risk precautions as documented by RN staff while hospitalized.)   PT Total Distance Amb During Session (feet) 0   Psychosocial Support   Trust Relationship/Rapport care explained;choices provided;emotional support provided;empathic listening provided;questions answered;questions encouraged;reassurance provided;thoughts/feelings acknowledged   Nutrition Interventions   Hypoglycemia Management blood glucose monitored   Genitourinary () Interventions   Urinary Elimination Promotion toileting device within reach

## 2025-01-30 NOTE — PLAN OF CARE
Goal Outcome Evaluation:      Plan of Care Reviewed With: patient    Overall Patient Progress: improvingOverall Patient Progress: improving     Neuro: A&O x4. Calm, cooperative. Uses call light appropriately.  Cardiac: ST/SR. HR . Soft Bps. Epi restarted for MAP >65  Resp: Alaina sounds dim. 1L NC.  GI/: BM x1.  Endocrine: BGM, sliding sale insulin  Pain: PRN oxy given once  Skin: no new concerns - heel dressing changed.  Lines: RPIV, Right IJ, L radial ART  Gtts: Epi 0.07, Heparin 1900  Other: Peritoneal dialysis ran overnight. Refuses activity - weight-shifting encouraged.      Plan: Pt wants palliative cares. Complete care conference as pt is now able to participate.      *see Epic flowsheets for full nursing assessment findings       Problem: Surgery Nonspecified  Goal: Blood Glucose Level Within Targeted Range  Outcome: Not Progressing  Intervention: Optimize Glycemic Control  Recent Flowsheet Documentation  Taken 1/30/2025 0400 by Claudia Medrano, RN  Hyperglycemia Management:   blood glucose monitored   correctional insulin given  Hypoglycemia Management: blood glucose monitored  Taken 1/30/2025 0000 by Claudia Medrano, RN  Hyperglycemia Management:   blood glucose monitored   correctional insulin given  Hypoglycemia Management: blood glucose monitored  Taken 1/29/2025 2000 by Claudia Medrano, RN  Hyperglycemia Management:   blood glucose monitored   correctional insulin given  Hypoglycemia Management: blood glucose monitored     Problem: Adult Inpatient Plan of Care  Goal: Plan of Care Review  Description: The Plan of Care Review/Shift note should be completed every shift.  The Outcome Evaluation is a brief statement about your assessment that the patient is improving, declining, or no change.  This information will be displayed automatically on your shift  note.  Outcome: Progressing  Flowsheets (Taken 1/30/2025 0520)  Plan of Care Reviewed With: patient  Overall Patient Progress: improving  Goal: Optimal  Comfort and Wellbeing  Outcome: Progressing  Intervention: Monitor Pain and Promote Comfort  Recent Flowsheet Documentation  Taken 1/30/2025 0400 by Claudia Medrano RN  Pain Management Interventions: medication (see MAR)  Taken 1/30/2025 0000 by Claudia Medrano RN  Pain Management Interventions:   rest   repositioned   quiet environment facilitated   pillow support provided   care clustered  Taken 1/29/2025 2035 by Claudia Medrano RN  Pain Management Interventions:   medication (see MAR)   repositioned  Taken 1/29/2025 2000 by Claudia Medrano RN  Pain Management Interventions:   rest   repositioned   quiet environment facilitated   pillow support provided   care clustered  Intervention: Provide Person-Centered Care  Recent Flowsheet Documentation  Taken 1/30/2025 0400 by Claudia Medrano RN  Trust Relationship/Rapport:   care explained   thoughts/feelings acknowledged   reassurance provided   choices provided   questions answered  Taken 1/30/2025 0000 by Claudia Medrano RN  Trust Relationship/Rapport:   care explained   thoughts/feelings acknowledged   reassurance provided   choices provided   questions answered  Taken 1/29/2025 2000 by Claudia Medrano RN  Trust Relationship/Rapport:   care explained   thoughts/feelings acknowledged   reassurance provided   choices provided   questions answered  Goal: Readiness for Transition of Care  Outcome: Progressing     Problem: Surgery Nonspecified  Goal: Effective Bowel Elimination  Outcome: Progressing  Intervention: Enhance Bowel Motility and Elimination  Recent Flowsheet Documentation  Taken 1/30/2025 0400 by Claudia Medrano RN  Bowel Motility Enhancement: oral intake encouraged  Taken 1/30/2025 0000 by Claudia Medrano RN  Bowel Motility Enhancement: oral intake encouraged  Taken 1/29/2025 2000 by Claudia Medrano RN  Bowel Motility Enhancement: oral intake encouraged  Bowel Elimination Management: (scheduled meds given)   hygiene measures promoted   other (see comments)  Goal: Optimal Pain  Control and Function  Outcome: Progressing  Intervention: Prevent or Manage Pain  Recent Flowsheet Documentation  Taken 1/30/2025 0400 by Claudia Medrano RN  Pain Management Interventions: medication (see MAR)  Taken 1/30/2025 0000 by Claudia Medrano RN  Pain Management Interventions:   rest   repositioned   quiet environment facilitated   pillow support provided   care clustered  Taken 1/29/2025 2035 by Claudia Medrano RN  Pain Management Interventions:   medication (see MAR)   repositioned  Taken 1/29/2025 2000 by Claudia Medrano RN  Pain Management Interventions:   rest   repositioned   quiet environment facilitated   pillow support provided   care clustered  Goal: Effective Oxygenation and Ventilation  Outcome: Progressing  Intervention: Optimize Oxygenation and Ventilation  Recent Flowsheet Documentation  Taken 1/30/2025 0400 by Claudia Medrano RN  Cough And Deep Breathing: done independently per patient  Airway/Ventilation Management:   calming measures promoted   airway patency maintained   oxygen therapy provided   position adjusted   pulmonary hygiene promoted  Activity Management: patient refuses activity  Head of Bed (HOB) Positioning: HOB at 30 degrees  Taken 1/30/2025 0200 by Claudia Medrano RN  Head of Bed (HOB) Positioning: HOB at 20-30 degrees  Taken 1/30/2025 0000 by Claudia Medrano RN  Cough And Deep Breathing: done independently per patient  Airway/Ventilation Management:   calming measures promoted   airway patency maintained   oxygen therapy provided   position adjusted   pulmonary hygiene promoted  Activity Management: patient refuses activity  Head of Bed (HOB) Positioning: HOB at 20-30 degrees  Taken 1/29/2025 2200 by Claudia Medrano RN  Head of Bed (HOB) Positioning: HOB at 30-45 degrees  Taken 1/29/2025 2000 by Claudia Medrano RN  Cough And Deep Breathing: done independently per patient  Airway/Ventilation Management:   calming measures promoted   airway patency maintained   oxygen therapy provided    position adjusted   pulmonary hygiene promoted  Activity Management: patient refuses activity  Head of Bed (HOB) Positioning: HOB at 30-45 degrees

## 2025-01-31 ENCOUNTER — APPOINTMENT (OUTPATIENT)
Dept: CARDIOLOGY | Facility: CLINIC | Age: 65
End: 2025-01-31
Attending: INTERNAL MEDICINE
Payer: MEDICARE

## 2025-01-31 ENCOUNTER — APPOINTMENT (OUTPATIENT)
Dept: OCCUPATIONAL THERAPY | Facility: CLINIC | Age: 65
DRG: 853 | End: 2025-01-31
Attending: INTERNAL MEDICINE
Payer: MEDICARE

## 2025-01-31 ENCOUNTER — APPOINTMENT (OUTPATIENT)
Dept: PHYSICAL THERAPY | Facility: CLINIC | Age: 65
DRG: 853 | End: 2025-01-31
Attending: INTERNAL MEDICINE
Payer: MEDICARE

## 2025-01-31 LAB
ANION GAP SERPL CALCULATED.3IONS-SCNC: 14 MMOL/L (ref 7–15)
BUN SERPL-MCNC: 59.7 MG/DL (ref 8–23)
CALCIUM SERPL-MCNC: 8.5 MG/DL (ref 8.8–10.4)
CHLORIDE SERPL-SCNC: 96 MMOL/L (ref 98–107)
CREAT SERPL-MCNC: 6.88 MG/DL (ref 0.67–1.17)
EGFRCR SERPLBLD CKD-EPI 2021: 8 ML/MIN/1.73M2
ERYTHROCYTE [DISTWIDTH] IN BLOOD BY AUTOMATED COUNT: 18.4 % (ref 10–15)
EST. AVERAGE GLUCOSE BLD GHB EST-MCNC: 143 MG/DL
GLUCOSE BLDC GLUCOMTR-MCNC: 124 MG/DL (ref 70–99)
GLUCOSE BLDC GLUCOMTR-MCNC: 139 MG/DL (ref 70–99)
GLUCOSE BLDC GLUCOMTR-MCNC: 155 MG/DL (ref 70–99)
GLUCOSE BLDC GLUCOMTR-MCNC: 168 MG/DL (ref 70–99)
GLUCOSE BLDC GLUCOMTR-MCNC: 169 MG/DL (ref 70–99)
GLUCOSE SERPL-MCNC: 228 MG/DL (ref 70–99)
HBA1C MFR BLD: 6.6 %
HCO3 SERPL-SCNC: 22 MMOL/L (ref 22–29)
HCT VFR BLD AUTO: 25.3 % (ref 40–53)
HGB BLD-MCNC: 8.1 G/DL (ref 13.3–17.7)
LVEF ECHO: NORMAL
MAGNESIUM SERPL-MCNC: 1.9 MG/DL (ref 1.7–2.3)
MCH RBC QN AUTO: 30.7 PG (ref 26.5–33)
MCHC RBC AUTO-ENTMCNC: 32 G/DL (ref 31.5–36.5)
MCV RBC AUTO: 96 FL (ref 78–100)
PHOSPHATE SERPL-MCNC: 5.4 MG/DL (ref 2.5–4.5)
PLATELET # BLD AUTO: 256 10E3/UL (ref 150–450)
POTASSIUM SERPL-SCNC: 3.8 MMOL/L (ref 3.4–5.3)
RBC # BLD AUTO: 2.64 10E6/UL (ref 4.4–5.9)
SODIUM SERPL-SCNC: 132 MMOL/L (ref 135–145)
UFH PPP CHRO-ACNC: 0.4 IU/ML
WBC # BLD AUTO: 14.9 10E3/UL (ref 4–11)

## 2025-01-31 PROCEDURE — 99233 SBSQ HOSP IP/OBS HIGH 50: CPT | Performed by: INTERNAL MEDICINE

## 2025-01-31 PROCEDURE — 83735 ASSAY OF MAGNESIUM: CPT

## 2025-01-31 PROCEDURE — 250N000013 HC RX MED GY IP 250 OP 250 PS 637: Performed by: INTERNAL MEDICINE

## 2025-01-31 PROCEDURE — 250N000011 HC RX IP 250 OP 636

## 2025-01-31 PROCEDURE — 999N000128 HC STATISTIC PERIPHERAL IV START W/O US GUIDANCE

## 2025-01-31 PROCEDURE — 82310 ASSAY OF CALCIUM: CPT

## 2025-01-31 PROCEDURE — 85018 HEMOGLOBIN: CPT

## 2025-01-31 PROCEDURE — 250N000013 HC RX MED GY IP 250 OP 250 PS 637

## 2025-01-31 PROCEDURE — 97530 THERAPEUTIC ACTIVITIES: CPT | Mod: GP

## 2025-01-31 PROCEDURE — 85520 HEPARIN ASSAY: CPT | Performed by: INTERNAL MEDICINE

## 2025-01-31 PROCEDURE — 250N000011 HC RX IP 250 OP 636: Performed by: INTERNAL MEDICINE

## 2025-01-31 PROCEDURE — 84100 ASSAY OF PHOSPHORUS: CPT

## 2025-01-31 PROCEDURE — 82374 ASSAY BLOOD CARBON DIOXIDE: CPT

## 2025-01-31 PROCEDURE — 93306 TTE W/DOPPLER COMPLETE: CPT | Mod: 26 | Performed by: INTERNAL MEDICINE

## 2025-01-31 PROCEDURE — 97530 THERAPEUTIC ACTIVITIES: CPT | Mod: GO

## 2025-01-31 PROCEDURE — 80048 BASIC METABOLIC PNL TOTAL CA: CPT

## 2025-01-31 PROCEDURE — 36415 COLL VENOUS BLD VENIPUNCTURE: CPT | Performed by: INTERNAL MEDICINE

## 2025-01-31 PROCEDURE — 250N000009 HC RX 250

## 2025-01-31 PROCEDURE — 99233 SBSQ HOSP IP/OBS HIGH 50: CPT | Mod: 24 | Performed by: INTERNAL MEDICINE

## 2025-01-31 PROCEDURE — 255N000002 HC RX 255 OP 636

## 2025-01-31 PROCEDURE — 250N000013 HC RX MED GY IP 250 OP 250 PS 637: Performed by: STUDENT IN AN ORGANIZED HEALTH CARE EDUCATION/TRAINING PROGRAM

## 2025-01-31 PROCEDURE — 120N000001 HC R&B MED SURG/OB

## 2025-01-31 PROCEDURE — C8929 TTE W OR WO FOL WCON,DOPPLER: HCPCS

## 2025-01-31 PROCEDURE — 90999 UNLISTED DIALYSIS PROCEDURE: CPT

## 2025-01-31 PROCEDURE — 83036 HEMOGLOBIN GLYCOSYLATED A1C: CPT | Performed by: INTERNAL MEDICINE

## 2025-01-31 RX ORDER — GENTAMICIN SULFATE 1 MG/G
CREAM TOPICAL DAILY PRN
Status: DISCONTINUED | OUTPATIENT
Start: 2025-01-31 | End: 2025-01-31

## 2025-01-31 RX ORDER — NICOTINE POLACRILEX 4 MG
15-30 LOZENGE BUCCAL
Status: DISCONTINUED | OUTPATIENT
Start: 2025-01-31 | End: 2025-03-01 | Stop reason: HOSPADM

## 2025-01-31 RX ORDER — DEXTROSE MONOHYDRATE 25 G/50ML
25-50 INJECTION, SOLUTION INTRAVENOUS
Status: DISCONTINUED | OUTPATIENT
Start: 2025-01-31 | End: 2025-03-01 | Stop reason: HOSPADM

## 2025-01-31 RX ORDER — METHOCARBAMOL 500 MG/1
500 TABLET, FILM COATED ORAL 3 TIMES DAILY PRN
Status: DISCONTINUED | OUTPATIENT
Start: 2025-01-31 | End: 2025-03-01 | Stop reason: HOSPADM

## 2025-01-31 RX ORDER — MIDODRINE HYDROCHLORIDE 2.5 MG/1
10 TABLET ORAL 2 TIMES DAILY WITH MEALS
Status: DISCONTINUED | OUTPATIENT
Start: 2025-01-31 | End: 2025-03-01 | Stop reason: HOSPADM

## 2025-01-31 RX ADMIN — ACETAMINOPHEN 975 MG: 325 TABLET, FILM COATED ORAL at 20:19

## 2025-01-31 RX ADMIN — SEVELAMER CARBONATE 800 MG: 800 TABLET, FILM COATED ORAL at 08:10

## 2025-01-31 RX ADMIN — HEPARIN SODIUM 1900 UNITS/HR: 10000 INJECTION, SOLUTION INTRAVENOUS at 14:39

## 2025-01-31 RX ADMIN — SEVELAMER CARBONATE 800 MG: 800 TABLET, FILM COATED ORAL at 12:26

## 2025-01-31 RX ADMIN — CALCITRIOL CAPSULES 0.25 MCG 0.25 MCG: 0.25 CAPSULE ORAL at 20:19

## 2025-01-31 RX ADMIN — SEVELAMER CARBONATE 800 MG: 800 TABLET, FILM COATED ORAL at 18:55

## 2025-01-31 RX ADMIN — ATORVASTATIN CALCIUM 40 MG: 40 TABLET, FILM COATED ORAL at 20:19

## 2025-01-31 RX ADMIN — MIDODRINE HYDROCHLORIDE 5 MG: 5 TABLET ORAL at 08:10

## 2025-01-31 RX ADMIN — PIPERACILLIN AND TAZOBACTAM 2.25 G: 2; .25 INJECTION, POWDER, FOR SOLUTION INTRAVENOUS at 04:18

## 2025-01-31 RX ADMIN — HEPARIN SODIUM 1900 UNITS/HR: 10000 INJECTION, SOLUTION INTRAVENOUS at 00:47

## 2025-01-31 RX ADMIN — CINACALCET 60 MG: 30 TABLET ORAL at 20:19

## 2025-01-31 RX ADMIN — ACETAMINOPHEN 975 MG: 325 TABLET, FILM COATED ORAL at 04:19

## 2025-01-31 RX ADMIN — Medication 5 MG: at 21:28

## 2025-01-31 RX ADMIN — PIPERACILLIN AND TAZOBACTAM 2.25 G: 2; .25 INJECTION, POWDER, FOR SOLUTION INTRAVENOUS at 20:19

## 2025-01-31 RX ADMIN — MIDODRINE HYDROCHLORIDE 10 MG: 2.5 TABLET ORAL at 18:54

## 2025-01-31 RX ADMIN — CLOPIDOGREL BISULFATE 75 MG: 75 TABLET ORAL at 20:19

## 2025-01-31 RX ADMIN — PERFLUTREN 2 ML: 6.52 INJECTION, SUSPENSION INTRAVENOUS at 09:32

## 2025-01-31 RX ADMIN — Medication 1 CAPSULE: at 08:10

## 2025-01-31 RX ADMIN — PANTOPRAZOLE SODIUM 40 MG: 40 INJECTION, POWDER, FOR SOLUTION INTRAVENOUS at 08:10

## 2025-01-31 RX ADMIN — SENNOSIDES AND DOCUSATE SODIUM 1 TABLET: 50; 8.6 TABLET ORAL at 20:19

## 2025-01-31 RX ADMIN — OXYCODONE HYDROCHLORIDE 2.5 MG: 5 TABLET ORAL at 13:55

## 2025-01-31 RX ADMIN — PIPERACILLIN AND TAZOBACTAM 2.25 G: 2; .25 INJECTION, POWDER, FOR SOLUTION INTRAVENOUS at 12:26

## 2025-01-31 RX ADMIN — SENNOSIDES AND DOCUSATE SODIUM 1 TABLET: 50; 8.6 TABLET ORAL at 13:55

## 2025-01-31 RX ADMIN — ACETAMINOPHEN 975 MG: 325 TABLET, FILM COATED ORAL at 12:26

## 2025-01-31 RX ADMIN — POLYETHYLENE GLYCOL 3350 17 G: 17 POWDER, FOR SOLUTION ORAL at 13:55

## 2025-01-31 ASSESSMENT — ACTIVITIES OF DAILY LIVING (ADL)
ADLS_ACUITY_SCORE: 62
ADLS_ACUITY_SCORE: 63
ADLS_ACUITY_SCORE: 63
ADLS_ACUITY_SCORE: 62
ADLS_ACUITY_SCORE: 63
ADLS_ACUITY_SCORE: 63

## 2025-01-31 NOTE — PLAN OF CARE
Problem: Adult Inpatient Plan of Care  Goal: Plan of Care Review  Description: The Plan of Care Review/Shift note should be completed every shift.  The Outcome Evaluation is a brief statement about your assessment that the patient is improving, declining, or no change.  This information will be displayed automatically on your shift  note.  Outcome: Met  Flowsheets (Taken 1/30/2025 1806)  Outcome Evaluation: pt a/o, able to make needs known. very pleasant. epi  able to be weaned off after midodrine started. NSR. pulses intact via doppler. up to recliner chair today with sling lift. tolerated well. tolerating diet. anuric. sat edge of bed with pt/ot this afternoon. pain controlled with oxycodone and tylenol. no calls/visits from family today. cont to monitor.     Problem: Adult Inpatient Plan of Care  Goal: Optimal Comfort and Wellbeing  Outcome: Met  Intervention: Monitor Pain and Promote Comfort  Recent Flowsheet Documentation  Taken 1/30/2025 1600 by Marcie Arriola RN  Pain Management Interventions:   rest   repositioned  Taken 1/30/2025 1400 by Marcie Arriola RN  Pain Management Interventions:   rest   repositioned  Taken 1/30/2025 1200 by Marcie Arriola RN  Pain Management Interventions:   rest   repositioned  Taken 1/30/2025 1132 by Marcie Arriola RN  Pain Management Interventions: medication (see MAR)  Taken 1/30/2025 1000 by Marcie Arriola RN  Pain Management Interventions:   rest   repositioned  Taken 1/30/2025 0800 by Marcie Arriola RN  Pain Management Interventions: (pain meds offered. pt declined)   rest   repositioned   declines  Intervention: Provide Person-Centered Care  Recent Flowsheet Documentation  Taken 1/30/2025 1600 by Marcie Arriola RN  Trust Relationship/Rapport:   care explained   choices provided   emotional support provided   empathic listening provided   questions answered   questions encouraged   reassurance provided   thoughts/feelings acknowledged  Taken  1/30/2025 1200 by Marcie Arriola RN  Trust Relationship/Rapport:   care explained   choices provided   emotional support provided   empathic listening provided   questions answered   questions encouraged   reassurance provided   thoughts/feelings acknowledged  Taken 1/30/2025 0800 by Marcie Arriola RN  Trust Relationship/Rapport:   care explained   choices provided   emotional support provided   empathic listening provided   questions answered   questions encouraged   reassurance provided   thoughts/feelings acknowledged     Problem: Surgery Nonspecified  Goal: Absence of Bleeding  Outcome: Met  Intervention: Monitor and Manage Bleeding  Recent Flowsheet Documentation  Taken 1/30/2025 1600 by Marcie Arriola RN  Bleeding Management: dressing monitored  Taken 1/30/2025 1200 by Marcie Arriola RN  Bleeding Management: dressing monitored  Taken 1/30/2025 0800 by Marcie Arriola RN  Bleeding Management: dressing monitored     Problem: Surgery Nonspecified  Goal: Effective Oxygenation and Ventilation  Outcome: Met  Intervention: Optimize Oxygenation and Ventilation  Recent Flowsheet Documentation  Taken 1/30/2025 1800 by Marcie Arriola RN  Activity Management: bedrest  Head of Bed (HOB) Positioning: HOB at 30 degrees  Taken 1/30/2025 1700 by Marcie Arriola RN  Head of Bed (HOB) Positioning: (eating dinner) HOB at 60-90 degrees  Taken 1/30/2025 1615 by Marcie Arriola RN  Activity Management: back to bed  Head of Bed (HOB) Positioning: HOB at 30 degrees  Taken 1/30/2025 1600 by Marcie Arriola RN  Cough And Deep Breathing: done independently per patient  Airway/Ventilation Management: calming measures promoted  Activity Management: (with PT)   activity encouraged   sitting, edge of bed  Taken 1/30/2025 1400 by Marcie Arriola RN  Activity Management: bedrest  Head of Bed (HOB) Positioning: HOB at 30 degrees  Taken 1/30/2025 1330 by Marcie Arriola RN  Activity Management: back to  bed  Taken 1/30/2025 1200 by Marcie Arriola RN  Cough And Deep Breathing: done independently per patient  Airway/Ventilation Management: calming measures promoted  Activity Management: up in chair  Taken 1/30/2025 1000 by Marcie Arriola RN  Activity Management: bedrest  Head of Bed (HOB) Positioning: HOB at 30 degrees  Taken 1/30/2025 0800 by Marcie Arriola RN  Cough And Deep Breathing: done independently per patient  Airway/Ventilation Management: calming measures promoted  Activity Management: bedrest  Head of Bed (HOB) Positioning: HOB at 30-45 degrees   Goal Outcome Evaluation:                 Outcome Evaluation: pt a/o, able to make needs known. very pleasant. epi  able to be weaned off after midodrine started. NSR. pulses intact via doppler. up to recliner chair today with sling lift. tolerated well. tolerating diet. anuric. sat edge of bed with pt/ot this afternoon. pain controlled with oxycodone and tylenol. no calls/visits from family today. cont to monitor.

## 2025-01-31 NOTE — PLAN OF CARE
Goal Outcome Evaluation:      Plan of Care Reviewed With: patient    Overall Patient Progress: improvingOverall Patient Progress: improving      Neuro: A&O x4. Calm, cooperative. Follows commands. Uses call light appropriately.  Cardiac: ST/SR. HR . Soft Bps - MAP >65.  Resp: Alaina sounds dim. 2L NC.   GI/: 100mL urine. No BM.  Endocrine: BGM, sliding scale insulin  Pain: PRN oxy given twice (pain 4 out of 10 in R heel)  Skin: no new concerns - heel dressing changed.  Lines: RPIV, Right IJ, L radial ART  Gtts: Heparin 1900  Other: Peritoneal dialysis ran overnight.    Plan: Transfer out of ICU when able.        *see Epic flowsheets for full nursing assessment findings       Problem: Adult Inpatient Plan of Care  Goal: Readiness for Transition of Care  Outcome: Progressing     Problem: Surgery Nonspecified  Goal: Blood Glucose Level Within Targeted Range  Outcome: Progressing  Intervention: Optimize Glycemic Control  Recent Flowsheet Documentation  Taken 1/31/2025 0400 by Claudia Medrano RN  Hyperglycemia Management: blood glucose monitored  Hypoglycemia Management: blood glucose monitored  Taken 1/31/2025 0000 by Claudia Medrano RN  Hyperglycemia Management: blood glucose monitored  Hypoglycemia Management: blood glucose monitored  Taken 1/30/2025 2000 by Claduia Medrano RN  Hyperglycemia Management: blood glucose monitored  Hypoglycemia Management: blood glucose monitored     Problem: Pain Acute  Goal: Optimal Pain Control and Function  Outcome: Progressing  Intervention: Optimize Psychosocial Wellbeing  Recent Flowsheet Documentation  Taken 1/31/2025 0400 by Claudia Medrano RN  Supportive Measures:   active listening utilized   decision-making supported   self-care encouraged   positive reinforcement provided   problem-solving facilitated   goal-setting facilitated  Taken 1/31/2025 0000 by Claudia Medrano RN  Supportive Measures:   active listening utilized   decision-making supported   self-care encouraged    positive reinforcement provided   problem-solving facilitated   goal-setting facilitated  Taken 1/30/2025 2000 by Claudia Medrano RN  Supportive Measures:   active listening utilized   decision-making supported   self-care encouraged   positive reinforcement provided   problem-solving facilitated   goal-setting facilitated  Intervention: Develop Pain Management Plan  Recent Flowsheet Documentation  Taken 1/31/2025 0400 by Claudia Medrano RN  Pain Management Interventions:   rest   repositioned  Taken 1/31/2025 0000 by Claudia Medrano RN  Pain Management Interventions:   rest   repositioned  Taken 1/30/2025 2000 by Claudia Medrano RN  Pain Management Interventions:   rest   repositioned  Intervention: Prevent or Manage Pain  Recent Flowsheet Documentation  Taken 1/31/2025 0400 by Claudia Medrano RN  Sensory Stimulation Regulation:   auditory stimulation minimized   care clustered   lighting decreased  Medication Review/Management: medications reviewed  Taken 1/31/2025 0000 by Claudia Medrano RN  Sensory Stimulation Regulation:   auditory stimulation minimized   care clustered   lighting decreased  Medication Review/Management: medications reviewed  Taken 1/30/2025 2000 by Claudia Medrano RN  Sensory Stimulation Regulation:   auditory stimulation minimized   care clustered   lighting decreased  Medication Review/Management: medications reviewed

## 2025-01-31 NOTE — PROGRESS NOTES
CLINICAL NUTRITION SERVICES - REASSESSMENT NOTE     Malnutrition Status (1/28):    % Intake: < 75% for > 7 days (moderate)  % Weight Loss: Weight loss does not meet criteria   Subcutaneous Fat Loss: Orbital: Severe and Triceps: Severe  Muscle Loss: Wasting of the temples (temporalis muscle): Severe, Clavicles (pectoralis and deltoids): Severe, Shoulders (deltoids): Severe, Thigh (quadriceps): Severe, and Calf (gastrocnemius): Severe  Fluid Accumulation/Edema: None noted  Malnutrition Diagnosis: Severe malnutrition in the context of chronic illness  Malnutrition Present on Admission: Unable to assess    Registered Dietitian Interventions:  Medical food supplement therapy - Expedite Cup once daily + Nepro once daily   Team meeting involving nutrition professional - Patient discussed today during interdisciplinary bedside rounds       SUBJECTIVE INFORMATION  Patient not available for interview due to receiving cares from nurses x 2     CURRENT NUTRITION ORDERS  Diet: Mechanical/Dental Soft  Nutrition Support: Patient started on TF 1/28 but never advanced to goal as patient was extubated on 1/29 and OG pulled     CURRENT INTAKE/TOLERANCE  Attempted to visit with patient but he was having several cares done by two nurses at bedside  Getting ready to transfer out of ICU    Reviewed flowsheets and noted that he has been eating fairly well, however ordering smaller meals   Breakfast this morning was an omelet, toast, and 1% milk -- ate 50%  Dinner last night was chili and 1% milk -- ate 100%  Lunch yesterday was a grilled cheese, soup, and cran juice -- ate 75%     NEW FINDINGS  Weight: 80.8 kg (up from dosing wt) - Likely fluids d/t PD   Skin/wounds: Pressure injury on right heel (unstageable) -- WOCN following and identified 1/28  GI symptoms: Last BM x 1 on 1/29  Nutrition-relevant labs:  Na 132 (L), K/Mg normal, Phos 5.4 (H) - PD, BUN 59.7 (H), Cr 6.88 (H) - PD, -292 (Med sliding scale + Lantus 15 units per  day)  Nutrition-relevant medications:  MVI, Lantus + Med sliding scale insulin     1/29: Extubated   Dosing Weight: 76.2 kg, based on actual wt     ASSESSED NUTRITION NEEDS  Estimated Energy Needs: 7480-8648 kcals/day (25 - 30 kcals/kg)  Justification: Maintenance  Estimated Protein Needs: 115-135 grams protein/day (1.5 - 1.8 grams of pro/kg)  Justification:  PD and Hypercatabolism with acute illness  Estimated Fluid Needs: 7473-0228 mL/day (1 mL/kcal)  Justification:  Or per MD       EVALUATION OF THE PROGRESS TOWARD GOALS   Previous Goals (1/28)  Goal TF regimen will meet % needs   Evaluation: Unable to meet    Previous Nutrition Diagnosis (1/28)  Inadequate protein energy intake related to NPO with plans to start TF today as evidenced by meeting 0% protein and 6% energy needs from Propofol   Evaluation: Improving    NUTRITION DIAGNOSIS  Inadequate oral intake related to recent extubation, slow diet advancement as evidenced by taking % of very small meals, need for oral nutritional supplement    INTERVENTIONS  Medical food supplement therapy - Expedite Cup once daily + Nepro once daily   Team meeting involving nutrition professional - Patient discussed today during interdisciplinary bedside rounds    Goals  Patient to consume % of nutritionally adequate meal trays TID, or the equivalent with supplements/snacks     Monitoring/Evaluation      Progress toward goals will be monitored and evaluated per policy    Allyssa Moore RD, LD, CNSC   Clinical Dietitian - [facility]

## 2025-01-31 NOTE — PROGRESS NOTES
Cycler set up per protocol and per treatment orders     Results from previous treatment:  Effluent color and clarity: Yellow and clear, no fibrin noted  Total UF: 612 ml  Initial Drain: 18 ml  Avg Dwell: 1:25  Lost Dwell: 0:00    Cycler Serial Number: [id number]   Total Treatment Volume: 49692 mL  Total treatment time: 9 hrs  Fill Volume: 2000 ml  Last Fill Volume:0 ml  Heater ba.25 % 6000mL;  Lot #: [id number];  Expiration Date: 2025  Side Bag #1: 2.5% 6000mL;  Lot #: [id number];  Expiration Date: 2026  Number of cycles including final fill: 5  Dwell Time: 1:22 minutes  Last Fill Volume: 0 ml  Initial Drain Alarm: 0 ml  PD orders reviewed with Patient procedure and ESRD teaching done and questions answered.  Cycler ready for hook-up.    Report given to: MARYSOL Tolliver consent form signed yes

## 2025-01-31 NOTE — PROGRESS NOTES
Renal Medicine Progress Note            Assessment/Plan:     Arnulfo Pritchett is a 64 year old male CAD, HTN, HLD, pafib, HFrEF (EF 20-25%), ESRD on PD, DM2, TALI, RCC s/p R nephrectomy (2022), PAD with multiple LE procedures      # End stage renal disease on PD  Chronic PD for last 3.5 years.  Home unit [facility], nephrologist Dr. May  Rx: 5 cycles, 2 L fill volumes, 9 hours, last fill 1.2 L with external.    # Moderate R pleural effusion:      # PAD, right heel gangrene, occluded right SFA                              S/P : 1.   Right common femoral and below-knee popliteal/tibial endarterectomy                        2. Right common femoral to below-knee popliteal/tibial composite greater saphenous vein-6 mm   ringed PTFE Propaten bypas  Other issues-  Diabetes mellitus  Chronic paroxysmal atrial fibrillation  Secondary hyperparathyroidism and hyperphosphatemia  Severe heart failure with EF of 30% and mild RV dysfunction.      Plan:  #  PD order placed. Will use one bag of 4.25% dextrose and one bag of 2.5% dextrose  # Consider thoracentesis as PD will not be able to remove pleural fluid.   # Increase midodrine to 10 mg tid          Interval History:     Frail.  Afebrile.  BP is soft.   On midodrine 5 mg   Denies worsening SOB.  Total UF with PD ~ 600 ml ot 2.5% detrose          Medications and Allergies:     Current Facility-Administered Medications   Medication Dose Route Frequency Provider Last Rate Last Admin    - MEDICATION INSTRUCTIONS for Dialysis Patients -   Does not apply See Admin Instructions Melissa Macias MD        acetaminophen (TYLENOL) tablet 975 mg  975 mg Oral or Feeding Tube Q8H Enedina Osman MD   975 mg at 01/31/25 0419    Or    acetaminophen (TYLENOL) Suppository 650 mg  650 mg Rectal Q8H Enedina Osman MD        [Held by provider] allopurinol (ZYLOPRIM) tablet 200 mg  200 mg Oral QPM Milo Carrion MD   200 mg at 01/26/25 1957    atorvastatin  (LIPITOR) tablet 40 mg  40 mg Oral or Feeding Tube At Bedtime Melissa Macias MD   40 mg at 01/30/25 2015    calcitRIOL (ROCALTROL) capsule 0.25 mcg  0.25 mcg Oral At Bedtime Melissa Macias MD   0.25 mcg at 01/30/25 2016    cinacalcet (SENSIPAR) tablet 60 mg  60 mg Oral Q Mon Wed Fri AM Milo Carrion MD   60 mg at 01/29/25 2035    clopidogrel (PLAVIX) tablet 75 mg  75 mg Oral or Feeding Tube QPM Melissa Macias MD   75 mg at 01/30/25 2016    insulin aspart (NovoLOG) injection (RAPID ACTING)  1-7 Units Subcutaneous Q4H Patricia Dykes APRN CNP   1 Units at 01/31/25 0810    insulin glargine (LANTUS PEN) injection 15 Units  15 Units Subcutaneous Daily Enedina Osman MD   15 Units at 01/31/25 0811    melatonin tablet 5 mg  5 mg Oral At Bedtime Enedina Osman MD   5 mg at 01/30/25 2205    [Held by provider] metoprolol succinate ER (TOPROL XL) 24 hr tablet 25 mg  25 mg Oral Daily Milo Carrion MD   25 mg at 01/27/25 0848    midodrine (PROAMATINE) tablet 5 mg  5 mg Oral BID Enedina Osman MD   5 mg at 01/31/25 0810    multivitamin RENAL (TRIPHROCAPS) capsule 1 capsule  1 capsule Oral Daily Melissa Macias MD   1 capsule at 01/31/25 0810    pantoprazole (PROTONIX) 2 mg/mL suspension 40 mg  40 mg Per Feeding Tube QAM  Patricia Dykes APRN CNP   40 mg at 01/28/25 0834    Or    pantoprazole (PROTONIX) IV push injection 40 mg  [facility] Patricia Dykes APRN CNP   40 mg at 01/31/25 0810    piperacillin-tazobactam (ZOSYN) 2.25 g vial to attach to  ml bag  2.25 g Intravenous Q8H Patricia Dykes APRN CNP   2.25 g at 01/31/25 0418    polyethylene glycol (MIRALAX) Packet 17 g  17 g Oral or NG Tube Daily Milo Carrion MD   17 g at 01/29/25 0816    senna-docusate (SENOKOT-S/PERICOLACE) 8.6-50 MG per tablet 1 tablet  1 tablet Oral or NG Tube BID Milo Carrion MD   1 tablet at 01/29/25 2036    sevelamer carbonate (RENVELA) tablet  800 mg  800 mg Oral TID w/meals AcunaStar MD   800 mg at 01/31/25 0810    sodium chloride (PF) 0.9% PF flush 3 mL  3 mL Intracatheter Q8H Milo Carrion MD   3 mL at 01/31/25 0429      No Known Allergies         Physical Exam:   Vitals were reviewed   , Blood pressure 117/87, pulse 111, temperature 97.6  F (36.4  C), temperature source Oral, resp. rate 22, weight 80.8 kg (178 lb 2.1 oz), SpO2 94%.    Wt Readings from Last 3 Encounters:   01/30/25 80.8 kg (178 lb 2.1 oz)   01/21/25 75.8 kg (167 lb)   01/03/25 78.9 kg (174 lb)       Intake/Output Summary (Last 24 hours) at 1/31/2025 1121  Last data filed at 1/31/2025 1000  Gross per 24 hour   Intake 1191.32 ml   Output 100 ml   Net 1091.32 ml     GENERAL APPEARANCE: Frail.   HEENT:  Eyes/ears/nose/neck grossly normal.   RESP: lungs cta b c good efforts, no crackles, rhonchi or wheezes  CV: RRR  ABDOMEN: soft, NT  EXTREMITIES/SKIN: No edema  NEURO: Awake, alert and following verbal commands            Data:     CBC RESULTS:     Recent Labs   Lab 01/31/25  0424 01/30/25  0429 01/29/25  0355 01/28/25  1347 01/28/25  0425 01/27/25  2226   WBC 14.9* 23.2*  23.2* 14.1* 16.9* 16.7* 15.3*   RBC 2.64* 2.72* 2.74* 3.06* 2.99* 2.97*   HGB 8.1* 8.6* 8.4* 9.5* 9.2* 9.1*   HCT 25.3* 26.2* 26.4* 29.3* 28.3* 28.0*    306 281 331 308 314       Basic Metabolic Panel:  Recent Labs   Lab 01/31/25  0806 01/31/25  0427 01/31/25  0424 01/30/25  2335 01/30/25  2023 01/30/25  1653 01/30/25 0431 01/30/25 0429 01/29/25 0357 01/29/25 0355 01/28/25 0429 01/28/25 0425 01/27/25 2241 01/27/25 2230 01/27/25 2034 01/27/25  1852   NA  --   --  132*  --   --   --   --  132*  --  133*  --  134*  --  135  --  135   POTASSIUM  --   --  3.8  --   --   --   --  4.0  --  4.4  --  5.2  --  4.8  --  4.3   CHLORIDE  --   --  96*  --   --   --   --  94*  --  95*  --  98  --  98  --  97*   CO2  --   --  22  --   --   --   --  22  --  20*  --  21*  --  20*  --  21*   BUN  --   --  59.7*   --   --   --   --  60.0*  --  56.7*  --  51.3*  --  48.6*  --  48.5*   CR  --   --  6.88*  --   --   --   --  7.06*  --  7.69*  --  7.65*  --  7.38*  --  7.24*   * 169* 228* 164* 138* 188*   < > 348*   < > 326*   < > 208*   < > 155*   < > 81   TERENCE  --   --  8.5*  --   --   --   --  9.2  --  9.6  --  9.6  --  9.5  --  9.4    < > = values in this interval not displayed.       INR  Recent Labs   Lab 01/27/25  2230   INR 1.77*      Attestation:   I have reviewed today's relevant vital signs, notes, medications, labs and imaging.    Star Acuna MD  [facility] - Nephrology  Office phone :[phone]  Pager: [phone]

## 2025-01-31 NOTE — PROGRESS NOTES
[facility]    Hospitalist Progress Note    Assessment & Plan   Arnulfo Pritchett is a 64 year old male with personal medical history of severe heart failure (EF 30%), ESRD (on peritoneal dialysis), chronic paroxysmal AF on warfarin, LLE PAD s/p endarterectomy, RCC s/p nephrectomy admitted on 1/21/2025 for septic shock secondary to right leg PAD with worsening right heel necrotic ulcer. The patient was seen in ED at outside hospital on 1/3/25 and diagnosed with pneumonia with completed course of Cefdinir. He then presented on 1/21/25 to outside hospital for right leg pain and found to have right leg ulcer and transferred to [facility] for vascular surgery evaluation. Now s/p right common femoral BK popliteal/tibial endarterectomy and right common femoral to below-knee popliteal/tibial PTFE bypass performed on 1/27/25. The patient was admitted to the ICU for requirement of mechanical ventilation and pressor support following surgery for multifactorial shock.     Hx PAD of LLE s/p endarterectomy and fem-tibioperoneal trunk bypass on 10/25/24  Hx RLE arterial occlusion s/p SFA angioplasty and stent 5/2024  PAD RLE, right heel gangrene, occluded SFA, no evidence of osteomyelitis  s/p right common femoral and popliteal/tibial endarterectomy, right femoral to popliteal/tibial PTFE bypass 1/27/25   Assessment-  -found to have critical limb ischemia on admission on January 21.  -Distributive shock following surgery followed by the ICU service until 1/31  -Followed by vascular surgery, podiatry and wound care  -Coumadin has been held since admission.  He has been on heparin drip.  -Has been maintained on statin and Plavix 75 mg daily during this hospital stay  -He has been maintained on Zosyn this is day 11 per vascular surgery  -No plans for surgery regarding ulcer.  -Right leg incisions healing well.  Stable dry gangrene to the foot.  Antibiotics per signout more for prophylaxis rather than clear  infection  -Last BM 1/29    Plan-per vascular surgery, podiatry, wound care.  Last day of antibiotics on 2/1 per vascular surgery, as needed pain medications.  Bowel bowel regimen.  Encourage patient to take bowel meds daily    Multifactorial shock-resolved  , Onset immediately postoperative.  Transferred to ICU service postop day 0 from PACU.  Most likely vasoplegic secondary to stress of surgery and sedation vs less likely hypovolemic as patient has minimal urine output at baseline with known ESRD, lactate now normalized and wean off vasopressors 1/30.  -Transitioned to midodrine and increase to prior to admission doses on1/31.    End-stage renal disease  On peritoneal dialysis  Hx of secondary hyperparathyroidism and hyperphosphatemia, phosphorous elevated above baseline of 6 at most recent of 7.9   Assessment-followed by nephrology.  Did not require hemodialysis this hospital stay.  Was peritoneal dialysis 2 days surgery then has been on peritoneal dialysis every evening.  Weight being removed.    Plan-midodrine restarted to prior to admission dosing at 10 mg 3 times daily.  Per nephrology.    Pulmonary  Right pleural effusion  Status post thoracentesis 1/24   -Following surgery on admission by vascular surgery intubated.  Recent pneumonia treated with 3 weeks of Ceftin prior to this admission.  No concern for pneumonia per intensivist service.  -Extubated 1/29  -Right pleural effusion.  Status post thoracentesis1/24/25 with 1.5 L clear yellow fluid removed, likely transudative based on low cell count of 117, , and protein of 1.7. Possibly secondary to reduced peritoneal dialysis during hospitalization vs underlying severe HFrEF (30%)   -Weaning down oxygen.  Down to 1 to 2 L  -Decreased breath sounds right base about 50% up.  Plan-mobilize.  Transfer out of the ICU.  Incentive spirometry.  Aerobika valve, antibiotics for surgical issues. A.m. chest x-ray 2 views.  Assess for thoracentesis  tomorrow-ultrasound thoracentesis therapeutic not diagnostic ordered-adjust if needed.  Of note he is on heparin drip.    Paroxysmal atrial fibrillation.  He was on Coumadin and metoprolol prior to admission.  Ischemic cardiomyopathy with HLD, CAD s/p multiple stents, and severe EFrEF (30%)   Assessment-postoperatively has been maintained on heparin drip.  Coumadin has been held during his hospital stay.  -Echo 1/31-EF 25 to 30%.  Severe global hypokinesis with abnormal septal motion consistent with conduction abnormality.  Severe inferior wall hypokinesis.  LVH.  RV mildly dilated and mildly reduced RV function.  Mild mitral regurgitation.  Mild to moderate mitral stenosis.  Moderate aortic stenosis.  Left ventricular fairly pressures elevated.    Plan-continue heparin drip.  Transition to Coumadin timing per vascular surgery.  He is being maintained on statin.  Beta-blocker has been on hold.    Moderate protein calorie nutrition.  -Followed by nutrition.  Now on regular diet.    Hyperglycemia  Type 2 diabetes hemoglobin A1c 5.7% October 24  On insulin prior to admission.  -Blood sugars in the 100 range.  Has been on Lantus for the last 2 days.  On regular diet.  Tolerating diet    Plan-will change insulin sliding scale to 4 times daily.  Will add prandial aspart 1 unit per 15 g of carbs.  Continue Lantus at 15 units nightly for now.    Mixed anion gap acidosis.    -Improved respiratory function.  Anion gap resolved 1/31    Abdominal discomfort 1/30, -seems resolved  noted to have some right upper quadrant pain 1/30.  Now resolved.  LFTs normal.  Has been on Zosyn for above surgical issues.  Now seems resolved.  White blood cell count up to 23 on1/30 but down to 14 today.  Has been afebrile.  -Benign abdominal exam1/31.  Denies abdominal pain.  Tolerating diet.  -Continue to monitor.    Anemia of chronic disease.  Baseline around 10.  Has been stable in the 8 range.  No signs of bleeding.    Gout-on prior to  admission allopurinol.  Currently on hold.    History of renal cell carcinoma status post right nephrectomy.    History of obstructive sleep apnea.  Intolerant of CPAP    DVT Prophylaxis: He is on heparin drip for vascular disease per vascular surgery.  Stress ulcer prophylaxis.-PPI  IV access-right radial line removed, CVC right internal jugular line removed 1/31    Code Status: No CPR- Do NOT Intubate changed to DNR/DNI on1/30 with family and patient with discussions with palliative care    Clinically Significant Risk Factors         # Hyponatremia: Lowest Na = 132 mmol/L in last 2 days, will monitor as appropriate  # Hypochloremia: Lowest Cl = 94 mmol/L in last 2 days, will monitor as appropriate   # Hypercalcemia: corrected calcium is >10.1, will monitor as appropriate    # Hypoalbuminemia: Lowest albumin = 1.8 g/dL at 1/30/2025  3:17 PM, will monitor as appropriate     # Hypertension: Noted on problem list             # Severe Malnutrition: based on nutrition assessment    # Financial/Environmental Concerns:            Disposition: May require TCU.  Medically Ready for Discharge: Anticipated in 2-4 Days pending clinical course per vascular surgery and nephrology.    Complex medical decision making.  Greater than 60 minutes spent on patient care including care coordination with intensivist, bedside ICU nurse, chart review, charting, exam, , transfer orders.      Walter Dempsey MD, MD  Text Page  (7am to 6pm)  Interval History   Had some mild abdominal discomfort yesterday which seems resolved.  Increasing his p.o. intake.  Weaning down oxygen.  [facility] onto the hospitalist service.  No acute events overnight.  600 cc removed on peritoneal dialysis last night.  Required 2 doses of oxycodone overnight for right heel pain described at 4 out of 10    He tells me this afternoon he is tolerating his diet.  No BM since January 29.  Passing gas.  Pain 3 out of 10 in  the right foot and right groin at surgical sites/ulcer site.  No RN concerns.    -Data reviewed today: I reviewed all new labs and imaging results over the last 24 hours. I personally reviewed left Haja studies last 24 hours.    Physical Exam   Temp: 97.5  F (36.4  C) Temp src: Oral BP: 107/87 Pulse: 103   Resp: 27 SpO2: (!) 91 % O2 Device: Nasal cannula Oxygen Delivery: 1 LPM  Vitals:    01/27/25 0658 01/29/25 0400 01/30/25 1445   Weight: 76.2 kg (167 lb 15.9 oz) 78.5 kg (173 lb 1 oz) 80.8 kg (178 lb 2.1 oz)     Vital Signs with Ranges  Temp:  [97.5  F (36.4  C)-97.6  F (36.4  C)] 97.5  F (36.4  C)  Pulse:  [] 103  Resp:  [12-30] 27  BP: ()/(49-87) 107/87  MAP:  [65 mmHg-91 mmHg] 65 mmHg  Arterial Line BP: ()/(52-67) 88/52  SpO2:  [91 %-98 %] 91 %  I/O last 3 completed shifts:  In: 1535.32 [P.O.:300; I.V.:1235.32]  Out: 1117 [Urine:100; Other:1017]    Constitutional: In bed, chronically ill-appearing gentleman  Respiratory: Decreased breath sounds right base about 50%.  Breathing easily.  Lungs clear on the left.  Clear anteriorly   Cardiovascular: Regular rate and rhythm no rubs gallops or murmurs appreciate  GI: Soft nontender nondistended.  Skin/Integumen: No rash or edema.  Ortho-right foot bandaged and in heel protector.  Right groin site appears to be healing well.  Limited range of motion right leg given pain.  Neurologic-normal speech mentation oriented.  Awake.  Moves all 4 extremities.  Psychiatric-slightly flat affect.  Otherwise calm cooperative.  Pleasant      Medications   Current Facility-Administered Medications   Medication Dose Route Frequency Provider Last Rate Last Admin    dextrose 10% infusion   Intravenous Continuous PRN Junior Chowdhury MD        dianeal PD LOW calcium-2.5% dex (calcium 2.5 mEq/L) 6,000 mL PERITONEAL DIALYSATE   Dialysis [facility] Supplied PD Acuna, Star Ortiz, MD        dianeal PD LOW calcium-4.25% dex (calcium 2.5 mEq/L) 6,000 mL PERITONEAL  DIALYSATE   Dialysis [facility] Supplied PD Star Acuna MD        heparin 25,000 units in 0.45% NaCl 250 mL ANTICOAGULANT infusion  0-5,000 Units/hr Intravenous Continuous Milo Carrion MD 19 mL/hr at 01/31/25 0800 1,900 Units/hr at 01/31/25 0800    Patient is already receiving anticoagulation with heparin, enoxaparin (LOVENOX), warfarin (COUMADIN)  or other anticoagulant medication   Does not apply Continuous PRN Milo Carrion MD        sodium chloride 0.9 % infusion   Intravenous Continuous Melissa Macias MD 20 mL/hr at 01/30/25 2045 Rate Verify at 01/30/25 2045     Current Facility-Administered Medications   Medication Dose Route Frequency Provider Last Rate Last Admin    - MEDICATION INSTRUCTIONS for Dialysis Patients -   Does not apply See Admin Instructions Melissa Macias MD        acetaminophen (TYLENOL) tablet 975 mg  975 mg Oral or Feeding Tube Q8H Enedina Osman MD   975 mg at 01/31/25 1226    Or    acetaminophen (TYLENOL) Suppository 650 mg  650 mg Rectal Q8H Enedina Osman MD        [Held by provider] allopurinol (ZYLOPRIM) tablet 200 mg  200 mg Oral QPM Milo Carrion MD   200 mg at 01/26/25 1957    atorvastatin (LIPITOR) tablet 40 mg  40 mg Oral or Feeding Tube At Bedtime Melissa Macias MD   40 mg at 01/30/25 2015    calcitRIOL (ROCALTROL) capsule 0.25 mcg  0.25 mcg Oral At Bedtime Melissa Macias MD   0.25 mcg at 01/30/25 2016    cinacalcet (SENSIPAR) tablet 60 mg  60 mg Oral Q Mon Wed Fri AM Milo Carrion MD   60 mg at 01/29/25 2035    clopidogrel (PLAVIX) tablet 75 mg  75 mg Oral or Feeding Tube QPM Melissa Macias MD   75 mg at 01/30/25 2016    insulin aspart (NovoLOG) injection (RAPID ACTING)   Subcutaneous TID w/meals Walter Dempsey MD        insulin aspart (NovoLOG) injection (RAPID ACTING)  1-7 Units Subcutaneous TID AC Walter Dempsey MD        insulin aspart (NovoLOG) injection (RAPID ACTING)  1-5 Units  Subcutaneous At Bedtime Walter Dempsey MD        insulin glargine (LANTUS PEN) injection 15 Units  15 Units Subcutaneous Daily Enedina Osman MD   15 Units at 01/31/25 0811    melatonin tablet 5 mg  5 mg Oral At Bedtime Enedina Osman MD   5 mg at 01/30/25 2205    [Held by provider] metoprolol succinate ER (TOPROL XL) 24 hr tablet 25 mg  25 mg Oral Daily Milo Carrion MD   25 mg at 01/27/25 0848    midodrine (PROAMATINE) tablet 10 mg  10 mg Oral BID w/meals Star Acuna MD        multivitamin RENAL (TRIPHROCAPS) capsule 1 capsule  1 capsule Oral Daily Melissa Macias MD   1 capsule at 01/31/25 0810    pantoprazole (PROTONIX) 2 mg/mL suspension 40 mg  40 mg Per Feeding Tube QAM AC Patricia Dykes APRN CNP   40 mg at 01/28/25 0834    Or    pantoprazole (PROTONIX) IV push injection 40 mg  40 mg Intravenous QAM AC Patricia Dykes APRN CNP   40 mg at 01/31/25 0810    piperacillin-tazobactam (ZOSYN) 2.25 g vial to attach to  ml bag  2.25 g Intravenous Q8H Patricia Dykes APRN CNP   2.25 g at 01/31/25 1226    polyethylene glycol (MIRALAX) Packet 17 g  17 g Oral or NG Tube Daily Milo Carrion MD   17 g at 01/29/25 0816    senna-docusate (SENOKOT-S/PERICOLACE) 8.6-50 MG per tablet 1 tablet  1 tablet Oral or NG Tube BID Milo Carrion MD   1 tablet at 01/29/25 2036    sevelamer carbonate (RENVELA) tablet 800 mg  800 mg Oral TID w/meals Star Acuna MD   800 mg at 01/31/25 1226    sodium chloride (PF) 0.9% PF flush 3 mL  3 mL Intracatheter Q8H Milo Carrion MD   3 mL at 01/31/25 1225       Data   Recent Labs   Lab 01/31/25  1233 01/31/25  0806 01/31/25  0427 01/31/25  0424 01/30/25  1653 01/30/25  1517 01/30/25  0431 01/30/25  0429 01/29/25  0357 01/29/25  0355 01/27/25  2241 01/27/25  2230 01/27/25  2034 01/27/25  1852   WBC  --   --   --  14.9*  --   --   --  23.2*  23.2*  --  14.1*   < >  --    < > 14.4*   HGB  --   --   --  8.1*  --   --   --  8.6*  --  8.4*    < >  --    < > 8.6*   MCV  --   --   --  96  --   --   --  96  --  96   < >  --    < > 95   PLT  --   --   --  256  --   --   --  306  --  281   < >  --    < > 330   INR  --   --   --   --   --   --   --   --   --   --   --  1.77*  --   --    NA  --   --   --  132*  --   --   --  132*  --  133*   < > 135  --  135   POTASSIUM  --   --   --  3.8  --   --   --  4.0  --  4.4   < > 4.8  --  4.3   CHLORIDE  --   --   --  96*  --   --   --  94*  --  95*   < > 98  --  97*   CO2  --   --   --  22  --   --   --  22  --  20*   < > 20*  --  21*   BUN  --   --   --  59.7*  --   --   --  60.0*  --  56.7*   < > 48.6*  --  48.5*   CR  --   --   --  6.88*  --   --   --  7.06*  --  7.69*   < > 7.38*  --  7.24*   ANIONGAP  --   --   --  14  --   --   --  16*  --  18*   < > 17*  --  17*   TERENCE  --   --   --  8.5*  --   --   --  9.2  --  9.6   < > 9.5  --  9.4   * 168* 169* 228*   < >  --    < > 348*   < > 326*   < > 155*   < > 81   ALBUMIN  --   --   --   --   --  1.8*  --   --   --   --   --   --   --  2.3*   PROTTOTAL  --   --   --   --   --  4.7*  --   --   --   --   --   --   --  5.3*   BILITOTAL  --   --   --   --   --  0.2  --   --   --   --   --   --   --  0.3   ALKPHOS  --   --   --   --   --  115  --   --   --   --   --   --   --  101   ALT  --   --   --   --   --  6  --   --   --   --   --   --   --  8   AST  --   --   --   --   --  12  --   --   --   --   --   --   --  12    < > = values in this interval not displayed.       Imaging:   Recent Results (from the past 24 hours)   XR Chest Port 1 View    Narrative    EXAM: XR CHEST PORT 1 VIEW  LOCATION: [facility]  DATE: 1/30/2025    INDICATION: pleural effusion  COMPARISON: Chest radiograph 1/29/2025.      Impression    IMPRESSION: Similar positioning of the right central venous catheter. Interval extubation.    Overall similar moderate right layering and trace left pleural effusions with adjacent atelectasis. No discernible pneumothorax.  Similar cardiomediastinal silhouette.

## 2025-01-31 NOTE — PROGRESS NOTES
Pt transferred out of ICU to general medicine. Son Ervin notified of transfer.     Pt A&Ox4. VSS on 1L NC. Tele SR/ST. Worked with PT/OT at bedside. BLE cool to touch, pulses dopplerable. Given oxy and scheduled tylenol for pain. ART line and R internal jugular CVC D/C'd.

## 2025-01-31 NOTE — PROGRESS NOTES
Critical Care  Note      01/31/2025    Name: Arnulfo Pritchett MRN#: [id number]   Age: 64 year old YOB: 1960     Hsptl Day# 10  ICU DAY #    MV DAY #             Problem List:   Active Problems:    Pleural effusion    Cellulitis    Pressure ulcer of foot    Acute respiratory failure with hypoxia (H)    Shock (H)    Toxic metabolic encephalopathy           Summary/Hospital Course:   Arnulfo Pritchett is a 64 year old male with personal medical history of severe heart failure (EF 30%), ESRD (on peritoneal dialysis), chronic paroxysmal AF on warfarin, LLE PAD s/p endarterectomy, RCC s/p nephrectomy admitted on 1/21/2025 for septic shock secondary to right leg PAD with worsening right heel necrotic ulcer. The patient was seen in ED at outside hospital on 1/3/25 and diagnosed with pneumonia with completed course of Cefdinir. He then presented on 1/21/25 to outside hospital for right leg pain and found to have right leg ulcer and transferred to [facility] for vascular surgery evaluation. Now s/p right common femoral BK popliteal/tibial endarterectomy and right common femoral to below-knee popliteal/tibial PTFE bypass performed on 1/27/25. The patient was admitted to the ICU for requirement of mechanical ventilation and pressor support following surgery for multifactorial shock.    Interval/overnight events:  -no acute events overnight  -Peritoneal dialysis ran overnight 1/31, 600 ml removed   -weaned from Epi PM on 1/30  -required two doses of oxycodone overnight for R heel pain of 4/10      Assessment and plan :   Arnulfo Pritchett IS a 64 year old male admitted on 1/21/2025 for septic shock secondary to right leg PAD with worsening right heel necrotic ulcer now s/p right popliteal endarterectomy with PTFE bypass on 1/27/25 requiring mechanical ventilation and pressure support for multifactorial shock following surgery.     I have personally reviewed the daily labs, imaging studies, cultures and discussed the case  with referring physician and consulting physicians.     My assessment and plan by system for this patient is as follows:    Neurology/Psychiatry:   # Pain/analgesia  # Sedation  # Delirium  Palliative was following, now signed off - goals of care conference completed 1/29, patient code status updated to DNR/DNI - goals include restorative care with limits  Scheduled Tylenol, Oxycodone decreased to 1.25 q4hr prn   Continue PTA Melatonin 5 mg    MSK:   # Hx PAD of LLE s/p endarterectomy and fem-tibioperoneal trunk bypass on 10/25/24  # Hx RLE arterial occlusion s/p SFA angioplasty and stent 5/2024  # PAD RLE, right heel gangrene, occluded SFA, no evidence of osteomyelitis  # s/p right common femoral and popliteal/tibial endarterectomy, right femoral to popliteal/tibial PTFE bypass 1/27/25  Vascular surgery following  Wound care following  Podiatry following  PT/OT following    Cardiovascular:   # Chronic paroxysmal atrial fibrillation on warfarin PTA  # Ischemic cardiomyopathy with HLD, CAD s/p multiple stents, and severe EFrEF (30%) - repeat Echo 1/30/25 pending results  # Multifactorial shock, most likely vasoplegic secondary to stress of surgery and sedation vs less likely hypovolemic as patient has minimal urine output at baseline with known ESRD, lactate now normalized and wean off vasopressors - improved   Weaned off Epi while maintaining MAP goal >65  Continue heparin IV 1900 units/hr  Continue Plavix, continue Atorvastatin  Hold warfarin, reversed with vit K by vascular surgery on 1/22/25.  Defer to their judgment on resuming  Midodrine increased to 10 mg BID per nephrology  Holding PTA metoprolol give continued pressor requirements  Repeat Echo completed 1/30, results pending     Pulmonary/Ventilator Management:   # Extubated 1/29 - initially mechanically ventilated for surgery during procedure on 1/27 and remained intubated due to encephalopathy shock and hypoxia  # Right pleural effusion with passive  atelectasis - stable.  Does not appear to be contributing to any respiratory failure  # s/p thoracentesis 1/24/25 with 1.5 L clear yellow fluid removed, likely transudative based on low cell count of 117, , and protein of 1.7. Possibly secondary to reduced peritoneal dialysis during hospitalization vs underlying severe HFrEF (30%)  # Mixed anion gap acidosis, improved respiratory function, airway protection and overall wakefulness following decreased opioid needs  Continue to monitor for need of thoracentesis - unable to remove pleural in pleural space with PD  Repeat CXR in 5-6 days to monitor pleural effusion    GI and Nutrition :   # Moderate protein-calorie malnutrition  # Mild abdominal pain R>L on 1/30/25, improved today, repeat lactic acid normal at 1.0, hepatic panel normal, reports mid epigastric pain with palpation 1/31 - denies reflux symptoms, already on PPI, unlikely infectious given normal lactate and WBC trending down  Continue regular diet with advancement as tolerated    Renal/Fluids/Electrolytes:   # ESRD on peritoneal dialysis, receiving daily PD.  Did not remove fluid overnight of 1/28 to 1/29, net +8.2L since admission, able to tolerate 1L of fluid removal with PD on 1/30, 600 ml 1/31, will need to continue to work on tolerating more  # Hx of RCC s/p R nephrectomy  # Anion gap metabolic acidosis. Lactic acid improving.  # Mild Hyponatremia  Nephrology following  Daily BMP  Continue daily PD    Infectious Disease:   # R heel necrotic ulcer and gangrenous leg. S/p bypass of right femoral to popliteal artery 1/27/25  # MRSA positive nasal swab on 1/21/25, blood culture no growth to date  # Leukocytosis - improved to 14.9 from 23.2 yesterday - no obvious source of infection, patient remains afebrile, hepatic panel and lactic acid normal  Continue 10 day course of IV Zosyn for foot wound - on day 8/10 - complete 2/1/25    Endocrine:   # Hx of secondary hyperparathyroidism and  hyperphosphatemia, phosphorous elevated above baseline of 6 at most recent of 7.9  # Hx type 2 DM, last A1c of 5.7 on 10/2024, beta hydroxybutyrate <0.1.  Has been on dialysis for quite some time.  Medium dose insulin sliding scale  Continue Lantus 15 units in evening  Continue Sensipar and sevelamer PTA for secondary hyperparathyroidism    Hematology/Oncology:   # Anemia of chronic disease, Hgb of 8.1, mild downtrend, no signs or symptoms of active bleeding, decreased from 12.2 on 1/22/25  # Leukocytosis - 14.9 improved from 23.2 yesterday  Daily CBC  Transfuse blood at Hgb < 7     IV/Access:   1. Venous access - CVC R internal jugular - plan to remove today  2. Arterial access -  right radial line - plan to remove today  3. Peritoneal dialysis catheter     ICU Prophylaxis:   1. DVT: IV Heparin  2. VAP: HOB 30 degrees, chlorhexidine rinse  3. Stress Ulcer: PPI/H2 blocker  4. Restraints: Nonviolent soft two point restraints required and necessary for patient safety and continued cares and good effect as patient continues to pull at necessary lines, tubes despite education and distraction. Will readdress daily.   5. Wound care  - WOC following  6. Feeding - regular diet advancing as tolerated  7. Family Update: will call family  8. Disposition - ICU    Clinically Significant Risk Factors         # Hyponatremia: Lowest Na = 132 mmol/L in last 2 days, will monitor as appropriate  # Hypochloremia: Lowest Cl = 94 mmol/L in last 2 days, will monitor as appropriate   # Hypercalcemia: corrected calcium is >10.1, will monitor as appropriate    # Hypoalbuminemia: Lowest albumin = 1.8 g/dL at 1/30/2025  3:17 PM, will monitor as appropriate     # Hypertension: Noted on problem list             # Severe Malnutrition: based on nutrition assessment    # Financial/Environmental Concerns:          Code Status: No CPR- Do NOT Intubate                      Key Medications:     Current Facility-Administered Medications   Medication  Dose Route Frequency Provider Last Rate Last Admin    - MEDICATION INSTRUCTIONS for Dialysis Patients -   Does not apply See Admin Instructions Melissa Macias MD        acetaminophen (TYLENOL) tablet 975 mg  975 mg Oral or Feeding Tube Q8H Enedina Osman MD   975 mg at 01/31/25 0419    Or    acetaminophen (TYLENOL) Suppository 650 mg  650 mg Rectal Q8H Enedina Osman MD        [Held by provider] allopurinol (ZYLOPRIM) tablet 200 mg  200 mg Oral QPM Milo Carrion MD   200 mg at 01/26/25 1957    atorvastatin (LIPITOR) tablet 40 mg  40 mg Oral or Feeding Tube At Bedtime Melissa Macias MD   40 mg at 01/30/25 2015    calcitRIOL (ROCALTROL) capsule 0.25 mcg  0.25 mcg Oral At Bedtime Melissa Macias MD   0.25 mcg at 01/30/25 2016    cinacalcet (SENSIPAR) tablet 60 mg  60 mg Oral Q Mon Wed Fri AM Milo Carrion MD   60 mg at 01/29/25 2035    clopidogrel (PLAVIX) tablet 75 mg  75 mg Oral or Feeding Tube QPM Melissa Macias MD   75 mg at 01/30/25 2016    insulin aspart (NovoLOG) injection (RAPID ACTING)  1-7 Units Subcutaneous Q4H Patricia Dykes APRN CNP   1 Units at 01/31/25 0810    insulin glargine (LANTUS PEN) injection 15 Units  15 Units Subcutaneous Daily Enedina Osman MD   15 Units at 01/31/25 0811    melatonin tablet 5 mg  5 mg Oral At Bedtime Enedina Osman MD   5 mg at 01/30/25 2205    [Held by provider] metoprolol succinate ER (TOPROL XL) 24 hr tablet 25 mg  25 mg Oral Daily Milo Carrion MD   25 mg at 01/27/25 0848    midodrine (PROAMATINE) tablet 5 mg  5 mg Oral BID Enedina Osman MD   5 mg at 01/31/25 0810    multivitamin RENAL (TRIPHROCAPS) capsule 1 capsule  1 capsule Oral Daily Melissa Macias MD   1 capsule at 01/31/25 0810    pantoprazole (PROTONIX) 2 mg/mL suspension 40 mg  40 mg Per Feeding Tube QAM AC Patricia Dykes, APRN CNP   40 mg at 01/28/25 0834    Or    pantoprazole (PROTONIX) IV push  injection 40 mg  40 mg Intravenous QAM AC Patricia Dykes APRN CNP   40 mg at 01/31/25 0810    piperacillin-tazobactam (ZOSYN) 2.25 g vial to attach to  ml bag  2.25 g Intravenous Q8H Patricia Dykes APRN CNP   2.25 g at 01/31/25 0418    polyethylene glycol (MIRALAX) Packet 17 g  17 g Oral or NG Tube Daily Milo Carrion MD   17 g at 01/29/25 0816    senna-docusate (SENOKOT-S/PERICOLACE) 8.6-50 MG per tablet 1 tablet  1 tablet Oral or NG Tube BID Milo Carrion MD   1 tablet at 01/29/25 2036    sevelamer carbonate (RENVELA) tablet 800 mg  800 mg Oral TID w/meals Star Acuna MD   800 mg at 01/31/25 0810    sodium chloride (PF) 0.9% PF flush 3 mL  3 mL Intracatheter Q8H Milo Carrion MD   3 mL at 01/31/25 0429     Current Facility-Administered Medications   Medication Dose Route Frequency Provider Last Rate Last Admin    Continuing statin from home medication list OR statin order already placed during this visit   Does not apply DOES NOT GO TO Milo Hernandez MD        dextrose 10% infusion   Intravenous Continuous PRN Junior Chowdhury MD        dianeal PD LOW calcium-2.5% dex (calcium 2.5 mEq/L) 6,000 mL PERITONEAL DIALYSATE   Dialysis [facility] Supplied PD Star Acuna MD        dianeal PD LOW calcium-2.5% dex (calcium 2.5 mEq/L) 6,000 mL PERITONEAL DIALYSATE   Dialysis [facility] Supplied PD Star Acuna MD        dianeal PD LOW calcium-4.25% dex (calcium 2.5 mEq/L) 6,000 mL PERITONEAL DIALYSATE   Dialysis [facility] Supplied PD Star Acuna MD        heparin 25,000 units in 0.45% NaCl 250 mL ANTICOAGULANT infusion  0-5,000 Units/hr Intravenous Continuous Milo Carrion MD 19 mL/hr at 01/31/25 0047 1,900 Units/hr at 01/31/25 0047    Patient is already receiving anticoagulation with heparin, enoxaparin (LOVENOX), warfarin (COUMADIN)  or other anticoagulant medication   Does not apply Continuous PRN Milo Carrion MD        sodium chloride 0.9 % infusion   Intravenous  Continuous Melissa Macias MD 20 mL/hr at 01/30/25 2045 Rate Verify at 01/30/25 2045              Physical Examination:   Temp:  [97.5  F (36.4  C)-98.4  F (36.9  C)] 97.5  F (36.4  C)  Pulse:  [] 90  Resp:  [12-30] 12  BP: ()/(49-78) 91/71  MAP:  [63 mmHg-91 mmHg] 65 mmHg  Arterial Line BP: ()/(33-67) 88/52  SpO2:  [94 %-99 %] 96 %    Intake/Output Summary (Last 24 hours) at 1/31/2025 0824  Last data filed at 1/31/2025 0600  Gross per 24 hour   Intake 1225.32 ml   Output 100 ml   Net 1125.32 ml     Wt Readings from Last 4 Encounters:   01/30/25 80.8 kg (178 lb 2.1 oz)   01/21/25 75.8 kg (167 lb)   01/03/25 78.9 kg (174 lb)   01/02/25 78.9 kg (173 lb 14.4 oz)     Arterial Line BP: ()/(33-67) 88/52  MAP:  [63 mmHg-91 mmHg] 65 mmHg  BP - Mean:  [58-85] 75  Resp: 12  Recent Labs   Lab 01/29/25  1048 01/29/25  0209 01/28/25 2159 01/28/25 2026   PH 7.28* 7.29* 7.27* 7.26*   PCO2 50* 44 42 45   PO2 130* 117* 109* 121*   HCO3 23 21 19* 20*   O2PER 30 30 30 30     GEN: no acute distress, lying comfortably in bed  HEENT: head ncat, sclera anicteric  PULM: lungs clear to auscultation, breathing comfortably on 1L NC  CV/COR: RRR S1S2 no gallop,  No rub, no murmur  ABD: soft, normoactive bowel sounds, no mass, mild tenderness to palpation in mid epigastric region, no symptoms of reflux, states that abdominal pain feels overall improved since yesterday  EXT:  RLE covered in wound boot with WC dressings, LLE warm, unable to obtain DP or posterior tib pulses  NEURO: PERRL, no obvious deficits, alert and responsive to questions  SKIN: no obvious rash  LINES: clean, dry intact         Data:   All data and imaging reviewed     ROUTINE ICU LABS (Last four results)  CMP  Recent Labs   Lab 01/31/25  0806 01/31/25  0427 01/31/25  0424 01/30/25  2335 01/30/25  1653 01/30/25  1517 01/30/25  0431 01/30/25  0429 01/29/25  0357 01/29/25  0355 01/28/25  0429 01/28/25  0425 01/27/25  2034 01/27/25  1852  01/24/25  1659 01/24/25  1612   NA  --   --  132*  --   --   --   --  132*  --  133*  --  134*   < > 135   < >  --    POTASSIUM  --   --  3.8  --   --   --   --  4.0  --  4.4  --  5.2   < > 4.3   < >  --    CHLORIDE  --   --  96*  --   --   --   --  94*  --  95*  --  98   < > 97*   < >  --    CO2  --   --  22  --   --   --   --  22  --  20*  --  21*   < > 21*   < >  --    ANIONGAP  --   --  14  --   --   --   --  16*  --  18*  --  15   < > 17*   < >  --    * 169* 228* 164*   < >  --    < > 348*   < > 326*   < > 208*   < > 81   < >  --    BUN  --   --  59.7*  --   --   --   --  60.0*  --  56.7*  --  51.3*   < > 48.5*   < >  --    CR  --   --  6.88*  --   --   --   --  7.06*  --  7.69*  --  7.65*   < > 7.24*   < >  --    GFRESTIMATED  --   --  8*  --   --   --   --  8*  --  7*  --  7*   < > 8*   < >  --    TERENCE  --   --  8.5*  --   --   --   --  9.2  --  9.6  --  9.6   < > 9.4   < >  --    MAG  --   --  1.9  --   --   --   --  1.9  --  2.0  --  1.7   < > 1.6*  --   --    PHOS  --   --  5.4*  --   --   --   --  6.0*  --  7.9*  --  8.0*   < > 6.1*  --   --    PROTTOTAL  --   --   --   --   --  4.7*  --   --   --   --   --   --   --  5.3*  --  5.7*   ALBUMIN  --   --   --   --   --  1.8*  --   --   --   --   --   --   --  2.3*  --   --    BILITOTAL  --   --   --   --   --  0.2  --   --   --   --   --   --   --  0.3  --   --    ALKPHOS  --   --   --   --   --  115  --   --   --   --   --   --   --  101  --   --    AST  --   --   --   --   --  12  --   --   --   --   --   --   --  12  --   --    ALT  --   --   --   --   --  6  --   --   --   --   --   --   --  8  --   --     < > = values in this interval not displayed.     CBC  Recent Labs   Lab 01/31/25  0424 01/30/25  0429 01/29/25  0355 01/28/25  1347   WBC 14.9* 23.2*  23.2* 14.1* 16.9*   RBC 2.64* 2.72* 2.74* 3.06*   HGB 8.1* 8.6* 8.4* 9.5*   HCT 25.3* 26.2* 26.4* 29.3*   MCV 96 96 96 96   MCH 30.7 31.6 30.7 31.0   MCHC 32.0 32.8 31.8 32.4   RDW 18.4* 18.6*  "18.3* 18.2*    306 281 331     INR  Recent Labs   Lab 01/27/25  2230   INR 1.77*     Arterial Blood Gas  Recent Labs   Lab 01/29/25  1048 01/29/25  0209 01/28/25  2159 01/28/25 2026   PH 7.28* 7.29* 7.27* 7.26*   PCO2 50* 44 42 45   PO2 130* 117* 109* 121*   HCO3 23 21 19* 20*   O2PER 30 30 30 30       All cultures:  No results for input(s): \"CULT\" in the last 168 hours.  Recent Results (from the past 24 hours)   XR Chest Port 1 View    Narrative    EXAM: XR CHEST PORT 1 VIEW  LOCATION: [facility]  DATE: 1/30/2025    INDICATION: pleural effusion  COMPARISON: Chest radiograph 1/29/2025.      Impression    IMPRESSION: Similar positioning of the right central venous catheter. Interval extubation.    Overall similar moderate right layering and trace left pleural effusions with adjacent atelectasis. No discernible pneumothorax. Similar cardiomediastinal silhouette.     Chio Mancera, MS4        Physician Attestation   I, Enedina Osman MD, was present with the medical/WILLIAM student who participated in the service and in the documentation of the note.  I have verified the history and personally performed the physical exam and medical decision making.  I agree with the assessment and plan of care as documented in the note.      Key findings: off pressors. Very low energy. Pain similar to better. 600ml net out for peritoneal dialysis making last 24 hours net even. Ok to transfer to floor.    Enedina Osman MD  Date of Service (when I saw the patient): 01/31/25    "

## 2025-01-31 NOTE — PROGRESS NOTES
VASCULAR SURGERY    Still in ICU.  Breathing very easily since extubation.  No cough or other issues.    Right leg incisions healing well.  Stable dry gangrene to foot.  Biphasic Doppler to right distal AT.    On maintenance peritoneal dialysis at night.    Yesterday's chest x-ray shows some slight improvement of aeration.  Stable right pleural effusion.    Patrick Hein MD

## 2025-02-01 ENCOUNTER — APPOINTMENT (OUTPATIENT)
Dept: GENERAL RADIOLOGY | Facility: CLINIC | Age: 65
DRG: 853 | End: 2025-02-01
Attending: INTERNAL MEDICINE
Payer: MEDICARE

## 2025-02-01 ENCOUNTER — APPOINTMENT (OUTPATIENT)
Dept: CT IMAGING | Facility: CLINIC | Age: 65
DRG: 853 | End: 2025-02-01
Attending: INTERNAL MEDICINE
Payer: MEDICARE

## 2025-02-01 LAB
ALLEN'S TEST: YES
ANION GAP SERPL CALCULATED.3IONS-SCNC: 17 MMOL/L (ref 7–15)
ATRIAL RATE - MUSE: 106 BPM
BASE EXCESS BLDA CALC-SCNC: -3 MMOL/L (ref -3–3)
BUN SERPL-MCNC: 58.5 MG/DL (ref 8–23)
CALCIUM SERPL-MCNC: 9.5 MG/DL (ref 8.8–10.4)
CHLORIDE SERPL-SCNC: 93 MMOL/L (ref 98–107)
CREAT SERPL-MCNC: 6.53 MG/DL (ref 0.67–1.17)
DIASTOLIC BLOOD PRESSURE - MUSE: NORMAL MMHG
EGFRCR SERPLBLD CKD-EPI 2021: 9 ML/MIN/1.73M2
ERYTHROCYTE [DISTWIDTH] IN BLOOD BY AUTOMATED COUNT: 18.3 % (ref 10–15)
GLUCOSE BLDC GLUCOMTR-MCNC: 106 MG/DL (ref 70–99)
GLUCOSE BLDC GLUCOMTR-MCNC: 167 MG/DL (ref 70–99)
GLUCOSE BLDC GLUCOMTR-MCNC: 194 MG/DL (ref 70–99)
GLUCOSE BLDC GLUCOMTR-MCNC: 236 MG/DL (ref 70–99)
GLUCOSE BLDC GLUCOMTR-MCNC: 56 MG/DL (ref 70–99)
GLUCOSE BLDC GLUCOMTR-MCNC: 67 MG/DL (ref 70–99)
GLUCOSE BLDC GLUCOMTR-MCNC: 96 MG/DL (ref 70–99)
GLUCOSE SERPL-MCNC: 285 MG/DL (ref 70–99)
HCO3 BLD-SCNC: 23 MMOL/L (ref 21–28)
HCO3 SERPL-SCNC: 22 MMOL/L (ref 22–29)
HCT VFR BLD AUTO: 28 % (ref 40–53)
HGB BLD-MCNC: 9.8 G/DL (ref 13.3–17.7)
INR PPP: 1.44 (ref 0.85–1.15)
INTERPRETATION ECG - MUSE: NORMAL
LACTATE SERPL-SCNC: 0.7 MMOL/L (ref 0.7–2)
MAGNESIUM SERPL-MCNC: 2.1 MG/DL (ref 1.7–2.3)
MCH RBC QN AUTO: 33 PG (ref 26.5–33)
MCHC RBC AUTO-ENTMCNC: 35 G/DL (ref 31.5–36.5)
MCV RBC AUTO: 94 FL (ref 78–100)
O2/TOTAL GAS SETTING VFR VENT: 26 %
OXYHGB MFR BLDA: 94 % (ref 92–100)
P AXIS - MUSE: 99 DEGREES
PCO2 BLD: 47 MM HG (ref 35–45)
PH BLD: 7.31 [PH] (ref 7.35–7.45)
PHOSPHATE SERPL-MCNC: 5.7 MG/DL (ref 2.5–4.5)
PLATELET # BLD AUTO: 319 10E3/UL (ref 150–450)
PO2 BLD: 82 MM HG (ref 80–105)
POTASSIUM SERPL-SCNC: 4.4 MMOL/L (ref 3.4–5.3)
PR INTERVAL - MUSE: 200 MS
QRS DURATION - MUSE: 106 MS
QT - MUSE: 348 MS
QTC - MUSE: 462 MS
R AXIS - MUSE: -88 DEGREES
RBC # BLD AUTO: 2.97 10E6/UL (ref 4.4–5.9)
SAO2 % BLDA: 95.9 % (ref 96–97)
SODIUM SERPL-SCNC: 132 MMOL/L (ref 135–145)
SYSTOLIC BLOOD PRESSURE - MUSE: NORMAL MMHG
T AXIS - MUSE: 89 DEGREES
UFH PPP CHRO-ACNC: 0.34 IU/ML
UFH PPP CHRO-ACNC: 0.37 IU/ML
VENTRICULAR RATE- MUSE: 106 BPM
WBC # BLD AUTO: 18.2 10E3/UL (ref 4–11)

## 2025-02-01 PROCEDURE — 250N000013 HC RX MED GY IP 250 OP 250 PS 637: Performed by: INTERNAL MEDICINE

## 2025-02-01 PROCEDURE — 80051 ELECTROLYTE PANEL: CPT | Performed by: INTERNAL MEDICINE

## 2025-02-01 PROCEDURE — 90945 DIALYSIS ONE EVALUATION: CPT

## 2025-02-01 PROCEDURE — 87040 BLOOD CULTURE FOR BACTERIA: CPT

## 2025-02-01 PROCEDURE — 85027 COMPLETE CBC AUTOMATED: CPT | Performed by: INTERNAL MEDICINE

## 2025-02-01 PROCEDURE — 85520 HEPARIN ASSAY: CPT | Performed by: INTERNAL MEDICINE

## 2025-02-01 PROCEDURE — 83735 ASSAY OF MAGNESIUM: CPT | Performed by: INTERNAL MEDICINE

## 2025-02-01 PROCEDURE — 36600 WITHDRAWAL OF ARTERIAL BLOOD: CPT

## 2025-02-01 PROCEDURE — 93005 ELECTROCARDIOGRAM TRACING: CPT

## 2025-02-01 PROCEDURE — 258N000003 HC RX IP 258 OP 636: Performed by: INTERNAL MEDICINE

## 2025-02-01 PROCEDURE — 80048 BASIC METABOLIC PNL TOTAL CA: CPT | Performed by: INTERNAL MEDICINE

## 2025-02-01 PROCEDURE — 71046 X-RAY EXAM CHEST 2 VIEWS: CPT

## 2025-02-01 PROCEDURE — 85610 PROTHROMBIN TIME: CPT | Performed by: INTERNAL MEDICINE

## 2025-02-01 PROCEDURE — 258N000003 HC RX IP 258 OP 636

## 2025-02-01 PROCEDURE — 36415 COLL VENOUS BLD VENIPUNCTURE: CPT

## 2025-02-01 PROCEDURE — 71250 CT THORAX DX C-: CPT

## 2025-02-01 PROCEDURE — 82805 BLOOD GASES W/O2 SATURATION: CPT

## 2025-02-01 PROCEDURE — 250N000011 HC RX IP 250 OP 636: Performed by: INTERNAL MEDICINE

## 2025-02-01 PROCEDURE — 36415 COLL VENOUS BLD VENIPUNCTURE: CPT | Performed by: INTERNAL MEDICINE

## 2025-02-01 PROCEDURE — 84100 ASSAY OF PHOSPHORUS: CPT | Performed by: INTERNAL MEDICINE

## 2025-02-01 PROCEDURE — 36415 COLL VENOUS BLD VENIPUNCTURE: CPT | Performed by: HOSPITALIST

## 2025-02-01 PROCEDURE — 99233 SBSQ HOSP IP/OBS HIGH 50: CPT | Performed by: HOSPITALIST

## 2025-02-01 PROCEDURE — 120N000013 HC R&B IMCU

## 2025-02-01 PROCEDURE — 258N000001 HC RX 258: Performed by: INTERNAL MEDICINE

## 2025-02-01 PROCEDURE — 93010 ELECTROCARDIOGRAM REPORT: CPT | Performed by: INTERNAL MEDICINE

## 2025-02-01 PROCEDURE — 83605 ASSAY OF LACTIC ACID: CPT | Performed by: HOSPITALIST

## 2025-02-01 PROCEDURE — 250N000009 HC RX 250: Performed by: INTERNAL MEDICINE

## 2025-02-01 PROCEDURE — 250N000013 HC RX MED GY IP 250 OP 250 PS 637: Performed by: HOSPITALIST

## 2025-02-01 RX ORDER — SODIUM CHLORIDE 9 MG/ML
INJECTION, SOLUTION INTRAVENOUS CONTINUOUS
Status: DISCONTINUED | OUTPATIENT
Start: 2025-02-01 | End: 2025-02-04

## 2025-02-01 RX ORDER — GENTAMICIN SULFATE 1 MG/G
CREAM TOPICAL DAILY PRN
Status: DISCONTINUED | OUTPATIENT
Start: 2025-02-01 | End: 2025-02-01

## 2025-02-01 RX ORDER — GENTAMICIN SULFATE 1 MG/G
CREAM TOPICAL DAILY PRN
Status: DISCONTINUED | OUTPATIENT
Start: 2025-02-01 | End: 2025-03-01 | Stop reason: HOSPADM

## 2025-02-01 RX ORDER — METRONIDAZOLE 500 MG/100ML
500 INJECTION, SOLUTION INTRAVENOUS EVERY 12 HOURS
Status: DISCONTINUED | OUTPATIENT
Start: 2025-02-01 | End: 2025-02-01

## 2025-02-01 RX ORDER — LIDOCAINE 40 MG/G
CREAM TOPICAL
Status: DISCONTINUED | OUTPATIENT
Start: 2025-02-01 | End: 2025-02-01

## 2025-02-01 RX ADMIN — CLOPIDOGREL BISULFATE 75 MG: 75 TABLET ORAL at 21:05

## 2025-02-01 RX ADMIN — PANTOPRAZOLE SODIUM 40 MG: 40 INJECTION, POWDER, FOR SOLUTION INTRAVENOUS at 09:05

## 2025-02-01 RX ADMIN — SEVELAMER CARBONATE 800 MG: 800 TABLET, FILM COATED ORAL at 09:06

## 2025-02-01 RX ADMIN — PIPERACILLIN AND TAZOBACTAM 2.25 G: 2; .25 INJECTION, POWDER, FOR SOLUTION INTRAVENOUS at 03:45

## 2025-02-01 RX ADMIN — GENTAMICIN SULFATE: 1 CREAM TOPICAL at 21:00

## 2025-02-01 RX ADMIN — ACETAMINOPHEN 975 MG: 325 TABLET, FILM COATED ORAL at 11:44

## 2025-02-01 RX ADMIN — SENNOSIDES AND DOCUSATE SODIUM 1 TABLET: 50; 8.6 TABLET ORAL at 21:04

## 2025-02-01 RX ADMIN — HEPARIN SODIUM 1900 UNITS/HR: 10000 INJECTION, SOLUTION INTRAVENOUS at 16:23

## 2025-02-01 RX ADMIN — ACETAMINOPHEN 975 MG: 325 TABLET, FILM COATED ORAL at 03:44

## 2025-02-01 RX ADMIN — ATORVASTATIN CALCIUM 40 MG: 40 TABLET, FILM COATED ORAL at 21:05

## 2025-02-01 RX ADMIN — OXYCODONE HYDROCHLORIDE 2.5 MG: 5 TABLET ORAL at 07:56

## 2025-02-01 RX ADMIN — DEXTROSE MONOHYDRATE 25 ML: 25 INJECTION, SOLUTION INTRAVENOUS at 17:42

## 2025-02-01 RX ADMIN — PIPERACILLIN AND TAZOBACTAM 2.25 G: 2; .25 INJECTION, POWDER, FOR SOLUTION INTRAVENOUS at 11:44

## 2025-02-01 RX ADMIN — PIPERACILLIN AND TAZOBACTAM 2.25 G: 2; .25 INJECTION, POWDER, FOR SOLUTION INTRAVENOUS at 21:11

## 2025-02-01 RX ADMIN — HEPARIN SODIUM 1900 UNITS/HR: 10000 INJECTION, SOLUTION INTRAVENOUS at 03:45

## 2025-02-01 RX ADMIN — CALCITRIOL CAPSULES 0.25 MCG 0.25 MCG: 0.25 CAPSULE ORAL at 21:05

## 2025-02-01 RX ADMIN — MIDODRINE HYDROCHLORIDE 10 MG: 2.5 TABLET ORAL at 09:05

## 2025-02-01 RX ADMIN — Medication 5 MG: at 21:05

## 2025-02-01 RX ADMIN — SENNOSIDES AND DOCUSATE SODIUM 1 TABLET: 50; 8.6 TABLET ORAL at 09:05

## 2025-02-01 RX ADMIN — ACETAMINOPHEN 975 MG: 325 TABLET, FILM COATED ORAL at 21:04

## 2025-02-01 RX ADMIN — Medication 12.5 MG: at 17:45

## 2025-02-01 RX ADMIN — Medication 1 CAPSULE: at 09:05

## 2025-02-01 RX ADMIN — POLYETHYLENE GLYCOL 3350 17 G: 17 POWDER, FOR SOLUTION ORAL at 09:04

## 2025-02-01 RX ADMIN — SODIUM CHLORIDE: 9 INJECTION, SOLUTION INTRAVENOUS at 11:45

## 2025-02-01 RX ADMIN — VANCOMYCIN HYDROCHLORIDE 1500 MG: 10 INJECTION, POWDER, LYOPHILIZED, FOR SOLUTION INTRAVENOUS at 15:04

## 2025-02-01 RX ADMIN — MIDODRINE HYDROCHLORIDE 10 MG: 2.5 TABLET ORAL at 17:45

## 2025-02-01 ASSESSMENT — ACTIVITIES OF DAILY LIVING (ADL)
ADLS_ACUITY_SCORE: 62
ADLS_ACUITY_SCORE: 62
ADLS_ACUITY_SCORE: 63
ADLS_ACUITY_SCORE: 62
ADLS_ACUITY_SCORE: 63
ADLS_ACUITY_SCORE: 63
ADLS_ACUITY_SCORE: 62
ADLS_ACUITY_SCORE: 63
ADLS_ACUITY_SCORE: 62
ADLS_ACUITY_SCORE: 63
ADLS_ACUITY_SCORE: 63
ADLS_ACUITY_SCORE: 62

## 2025-02-01 NOTE — PROGRESS NOTES
Cycler set up per protocol and per treatment orders     Results from previous treatment:  Effluent color and clarity: YELLOW, CLEAR  Total UF: 2139  Initial Drain: 138  Avg Dwell: 1:22  Lost Dwell: 0.02    Cycler Serial Number: [id number]  Total Treatment Volume: [id number]  Total treatment time: 9 hrs  Fill Volume: 2000ml  Last Fill Volume:0ml  Heater ba.5% 6000mL;  Lot #: [id number];  Expiration Date:   Side Bag #1: 2.5% 6000mL;  Lot #: [id number];  Expiration Date:   Number of cycles including final fill: 5  Dwell Time: 1:22 minutes  Last Fill Volume: 0ml  Initial Drain Alarm: 0ml  PD orders reviewed with Patient procedure and ESRD teaching done and questions answered.  Cycler ready for hook-up.    Report given to: FLOOR RN

## 2025-02-01 NOTE — PROGRESS NOTES
Transfer in/out    Transfer to [facility] from [address]  Report given to MARYSOL Chen    Brief note about patient status upon transfer   Aox3; disoriented to situation at times. HR in 100s-110s, otherwise VSS on 2L NC. States mild pain (2/10) at rest in RLE, on scheduled Tylenol. States increased pain in RLE when transferring or repositioning, PRN Oxycodone given x1. LS: LUE/LLL: clear, RUL/RML/RLL diminished. Frequent weak productive cough, pt using oral suction at bedside with small amount of white secretions. Pt stated coughing with food/drink this AM, SLP consult ordered, NPO. Chest xray and chest CT completed. RLE wound care completed. Hep gtt running at 1900U/hr, hepxa recheck in AM. NS@ 50ml/hr with intermittent ABX. B, 167.     Code status: Full Code  Full skin assessment done (add LDA if skin issue present). Initials of 2nd RN :NA  Fall Risk: Yes- Department fall risk interventions implemented.  Isolation: Contact  Patient belongings: Pt clothing, and bag with money and personal belongings sent with pt. Glasses, phone and phone  also sent with pt.    If patient is a 72 hour hold/Commitment are belongings removed from room and locked up? NA  Medication drips upon transfer: Hep gtt @ 1900 U/hr, NS @ 50ml/hr.   Sign and held orders released? Yes

## 2025-02-01 NOTE — PLAN OF CARE
Transfer from  approx 1500  C  VSS on 2L of O2. AxOx3, disoriented to time/date. Lethargic but arouses to voice. Denies pain. Tele is SR/ST. NPO except meds. Blood sugars monitored, hypoglycemic, given IV dextrose, improved. Anuric w/ nightly peritoneal dialysis. LS dim w fine crackles, shallow inspirations. Pt denies SOB. Turn/Repo Q2. Doppler pulses. Hep gtt at 1900, NS @ 50, intermittent antibiotics.  Speech consult tomorrow. CTM

## 2025-02-01 NOTE — PROGRESS NOTES
Infectious disease brief note as patient not in the room  Full consult note to follow  Received a consult for worsening respiratory status and leukocytosis while on Zosyn for 11 days  Per chart concern for worsening respiratory status but per nursing has stayed stable on the same 1 L of oxygen  CT scan pending from today  MRSA colonized noted  Adding vancomycin stopping Flagyl continue Zosyn for now get respiratory cultures if pleural fluid in the on the CT scan consider thoracocentesis and send fluid for cultures    Evelyn Nielsen MD  Infectious Disease       none... Patient/surrogate refused vaccine...

## 2025-02-01 NOTE — PROVIDER NOTIFICATION
"MD Notification    Notified Person: MD    Notified Person Name: Dr Askew      Notification Date/Time:today/now    Notification Interaction:vocera page     Purpose of Notification:15 beat v tach run, asymptomatic.     Orders Received:\"Monitor closely, continue telemetry.If recurrent, will need to recheck labs, please also notify on-call provider. EKG also ordered.\"    Comments:    "

## 2025-02-01 NOTE — PROGRESS NOTES
Vascular Surgery Progress Note  02/01/2025       Subjective:  - NAEON. Denies significant pain or discomfort this morning. Productive cough but able to cough up phlegm. States he is slightly short of breath. No fevers or chills recorded. CBC with elevated WBC compared to yesterday, but appears to be hemoconcentration. CXR with recurrent effusion.    Discussed code status with patient who states that he would like to be full code because he wants to live as long as possible to spend time with his grandchildren.     Objective:  Temp:  [96.6  F (35.9  C)-97.6  F (36.4  C)] 97.4  F (36.3  C)  Pulse:  [] 112  Resp:  [20-30] 22  BP: ()/(62-92) 132/92  SpO2:  [90 %-97 %] 90 %    I/O last 3 completed shifts:  In: 164 [P.O.:50; I.V.:114]  Out: 50 [Urine:50]      Gen: Awake, alert, NAD  Resp: Some shortness of breath on NC,  productive cough  Abd: soft, nondistended, nontender, groin incision c/d/i  Incision: c/d/I  Ext: WWP, no edema, monophasic AT signal     Labs:  Recent Labs   Lab 02/01/25  0605 01/31/25  0424 01/30/25  0429   WBC 18.2* 14.9* 23.2*  23.2*   HGB 9.8* 8.1* 8.6*    256 306       Recent Labs   Lab 02/01/25  0902 02/01/25  0605 02/01/25  0202 01/31/25  0427 01/31/25  0424 01/30/25  0431 01/30/25  0429   NA  --  132*  --   --  132*  --  132*   POTASSIUM  --  4.4  --   --  3.8  --  4.0   CHLORIDE  --  93*  --   --  96*  --  94*   CO2  --  22  --   --  22  --  22   BUN  --  58.5*  --   --  59.7*  --  60.0*   CR  --  6.53*  --   --  6.88*  --  7.06*   * 285* 236*   < > 228*   < > 348*   TERENCE  --  9.5  --   --  8.5*  --  9.2   MAG  --  2.1  --   --  1.9  --  1.9   PHOS  --  5.7*  --   --  5.4*  --  6.0*    < > = values in this interval not displayed.       Imaging:  XR Chest 2 Views   Final Result   IMPRESSION: Similar to slightly increased moderate to large layering right pleural effusion with presumed adjacent atelectasis. Trace left pleural effusion. Interval removal of the right  central venous catheter. No discernible pneumothorax. Similar    cardiomediastinal silhouette. Remote left rib fracture.      Echocardiogram Complete   Final Result      XR Chest Port 1 View   Final Result   IMPRESSION: Similar positioning of the right central venous catheter. Interval extubation.      Overall similar moderate right layering and trace left pleural effusions with adjacent atelectasis. No discernible pneumothorax. Similar cardiomediastinal silhouette.      XR Chest Port 1 View   Final Result   IMPRESSION: ET tube, enteric tube, and right IJ central venous catheter are unchanged in position. The heart is enlarged, similar prior study. There is central pulmonary venous congestion with a moderate right-sided effusion which layers posteriorly.    Perihilar Interstitial edema is also noted      XR Abdomen Port 1 View   Final Result   IMPRESSION: Enteric tube has been advanced, tip in the gastric antrum, satisfactory positioning. Postoperative changes in the abdomen. Residual contrast material in the colon. Atherosclerotic changes. Right pleural effusion with associated passive    atelectasis in the adjacent right base.      XR Chest Port 1 View   Final Result   IMPRESSION: Interval placement of endotracheal tube, tip 4.8 cm above the suki. Right IJ catheter has been placed, tip in the SVC. Enteric tube has been placed, tip and side-port below the diaphragm. Moderately large right pleural effusion with    associated passive atelectasis in the adjacent right lung, unchanged. Calcified granuloma left apex. Minor strands of linear atelectasis left base. No effusion on the left. Normal cardiac size. Coronary artery stent. Mild venous congestion.    Atherosclerotic thoracic aorta. Degenerative changes thoracic spine. Previously healed bilateral rib fracture deformities. Monitoring leads overlying the chest.      XR Abdomen Port 1 View   Final Result   IMPRESSION: Orogastric tube tip in the stomach. The  sidehole is just below the GE junction.      XR Surgery MOODY L/T 5 Min Fluoro w Stills   Final Result      XR Chest Port 1 View   Final Result   IMPRESSION: Moderate-large right pleural effusion, increased compared to prior. Clear left lung and pleural space. Stable enlarged cardiomediastinal silhouette. No pneumothorax.      MR Ankle Right w/o Contrast   Final Result   IMPRESSION:   1.  There is a soft tissue ulceration over the posterior aspect of the hindfoot with some surrounding edema or cellulitis but no evidence for abscess.   2.  No evidence for osteomyelitis.   3.  Small tibiotalar joint effusion.   4.  No evidence for fracture.   5.  Acute on chronic plantar fasciitis. Plantar calcaneal spurring.   6.  No additional tendinous or ligamentous pathology.            US Thoracentesis   Final Result   IMPRESSION:   Status post right ultrasound-guided thoracentesis.         IR Lower Extremity Angiogram Right   Final Result      CTA Abdomen Pelvis Runoff w Contrast   Final Result   IMPRESSION:   1.  Occluded distal right superficial femoral artery and an popliteal artery as above including stents in the SFA and popliteal arteries. The tibial arteries are difficult to evaluate due to poor flow and calcified plaque. The distal anterior tibial and    peroneal arteries are patent.   2.  Patent left common femoral artery and common femoral artery to tibioperoneal trunk bypass graft without evidence for significant stenosis.   3.  Large right pleural effusion and small left pleural effusion.   4.  Ascites most likely relates to a peritoneal dialysis catheter.   5.  Colonic diverticulosis.      XR Calcaneus Right G/E 2 Views   Final Result   IMPRESSION: Soft tissue ulcer plantar to the calcaneus. There is diffuse demineralization without discrete erosion. MRI would be more sensitive for early findings of osteomyelitis. Plantar calcaneal spur. Achilles enthesophyte. Vascular calcifications.      US Lower Extremity Venous  Mapping Right   Final Result   IMPRESSION:   1.  Right great saphenous vein measurements as described, relatively small diameters. Areas of echogenic walls, may relate to chronic superficial thrombophlebitis.      US Upper Extremity Venous Mapping Bilateral   Final Result   IMPRESSION: Bilateral upper extremity vein measurements as described. Both cephalic veins are relatively small in caliber.         US Lower Extremity Arterial Duplex Right   Final Result   IMPRESSION:   1.  No blood flow demonstrated in the distal superficial femoral, popliteal, and runoff arteries. There may be trickle blood flow in the distal runoff arteries, though difficult to distinguish from artifact.      2.  Uncertain if stent is in the right lower extremity. Previous angiogram images are not available for review.         US Thoracentesis    (Results Pending)        Assessment/Plan:   64 year old male who is POD5 s/p R fem-BK pop bypass with PTFE and proximal short GSV interposition graft. Extubated POD2, off pressor. Out of ICU on floor. Tachycardic, hypotensive, with worsening respiratory status. Unclear if related to pneumonia vs recurrent pleural effision    - Aggressive pulmonary toileting. CXR done this AM, may require additional thoracentesis. CT chest ordered. ABG ordered  - Will add flagyl for antibiotics, ID consulted for assistance with significant persistent leukocytosis (likely 2/2 pneumonia) despite antibiotics. Blood cultures sent  - SLP consulted for swallow eval, NPO until this is done. Will start gentle mIVF while NPO  - Plavix/heparin gtt  - Appreciate input from hospitalist and podiatry teams. No current plans for debridement of heel wound given dry gangrene and no osteomyelitis. If this were to change, would reengage Podiatry team     Seen, examined, and discussed with staff.  - - - - - - - - - - - - - - - - - -  Milo Carrion MD  Vascular Surgery Resident

## 2025-02-01 NOTE — PROGRESS NOTES
[facility]    Medicine Progress Note - Hospitalist Service    Date of Admission:  1/21/2025    Assessment & Plan   Arnulfo Pritchett is a 64 year old male with past medical history of severe heart failure (EF 30%), ESRD (on peritoneal dialysis), chronic paroxysmal AF on warfarin, LLE PAD s/p endarterectomy, RCC s/p nephrectomy admitted on 1/21/2025 for septic shock secondary to right leg PAD with worsening right heel necrotic ulcer. The patient was seen in ED at outside hospital on 1/3/25 and diagnosed with pneumonia, later completing course of Cefdinir. He then presented on 1/21/25 to outside hospital for right leg pain and found to have right leg ulcer and transferred to [facility] for vascular surgery evaluation. Now s/p right common femoral BK popliteal/tibial endarterectomy and right common femoral to below-knee popliteal/tibial PTFE bypass performed on 1/27/25. The patient was admitted to the ICU for requirement of mechanical ventilation and pressor support following surgery for multifactorial shock.  Hospitalist service consulted 1/31/2025 for transfer out of ICU and to assist with medical management.     Hx PAD of LLE s/p endarterectomy and fem-tibioperoneal trunk bypass on 10/25/24  Hx RLE arterial occlusion s/p SFA angioplasty and stent 5/2024  PAD RLE, right heel gangrene, occluded SFA, no evidence of osteomyelitis  s/p right common femoral and popliteal/tibial endarterectomy, right femoral to popliteal/tibial PTFE bypass 1/27/25   -found to have critical limb ischemia on admission on January 21, for details see admission note.   -Distributive shock following surgery followed by the ICU service until 1/31  -Followed by vascular surgery, podiatry and wound care  -Coumadin has been held since admission, recently on IV heparin drip.  -Has been maintained on statin and Plavix 75 mg daily during this hospital stay  -Continue IV piperacillin/tazobactam (Zosyn), as per vascular surgery and  ID  -ID consulted 2/1  -Future plans for surgical debridement as per vascular surgery and podiatry  -Wound care per surgery and WOC  -AM labs     Multifactorial shock, resolved  ICU admission, weaned off vasopressors 1/30.  -Transitioned to midodrine with increase in prior to admission dose on1/31.  -Monitor blood pressure and heart rate  -Antibiotics as above     End-stage renal disease (ESRD), on peritoneal dialysis  Hx of secondary hyperparathyroidism and hyperphosphatemia, phosphorous elevated above baseline of 6 at most recent of 7.9   Nephrology consulted, ongoing follow-up.  Did not require hemodialysis this hospital stay.  -Ongoing peritoneal dialysis, as per nephrology  -Continue midodrine, nephrology to review, monitor blood pressure     Acute hypoxemic respiratory failure  Right pleural effusion  Status post thoracentesis 1/24/25  -Following surgery on admission by vascular surgery was intubated.  Recent pneumonia treated with 3 weeks of Ceftin prior to this admission.  No concern for pneumonia per intensivist service.  -Extubated 1/29/25  -Right pleural effusion.  Status post thoracentesis1/24/25 with 1.5 L clear yellow fluid removed, likely transudative based on low cell count of 117, , and protein of 1.7. Possibly secondary to reduced peritoneal dialysis during hospitalization vs underlying severe HFrEF (30%)   -Wean down oxygen as able, encourage incentive spirometry  -Antibiotics as above  -ID consulted 2/1 as above, ongoing follow-up  -CT chest 2/1 per vascular surgery  -Consider thoracentesis pending CT chest results and clinical course**    Paroxysmal atrial fibrillation  Chronic anticoagulation prior to admission, warfarin - ON HOLD  Ischemic cardiomyopathy with HLD, CAD s/p multiple stents, and severe EFrEF (30%)   Wide complex tachycardia 2/1/25  Assessment-postoperatively has been maintained on heparin drip.  Coumadin has been held during his hospital stay.  -Echo 1/31-EF 25 to 30%.   Severe global hypokinesis with abnormal septal motion consistent with conduction abnormality.  Severe inferior wall hypokinesis.  LVH.  RV mildly dilated and mildly reduced RV function.  Mild mitral regurgitation.  Mild to moderate mitral stenosis.  Moderate aortic stenosis.  Left ventricular fairly pressures elevated.  -Episode of wide-complex tachycardia noted by nursing staff, 15 beats, up on 2/1/2025  -Continue inpatient telemetry  -Twelve-lead EKG 2/1  -Consider follow-up cardiology consultation if recurrent wide-complex tachycardia in the setting of left ventricular dysfunction  -Restart prior to admission beta-blocker, metoprolol, at adjusted dose, monitoring heart rate and blood pressure**  -Continue IV heparin anticoagulation, with future transition to oral Coumadin as prior to admission as per vascular surgery     Moderate protein calorie nutrition  -Followed by nutrition  -speech and language therapy (SLP) consulted, diet per speech therapy     Hyperglycemia  Type 2 diabetes   hemoglobin A1c 5.7% October 24  On insulin prior to admission.  -Monitor blood sugars  -Continue insulin, adjust as needed  Recent Labs   Lab 02/01/25  1137 02/01/25  0902 02/01/25  0605 02/01/25  0202 01/31/25  2102 01/31/25  1651   * 194* 285* 236* 124* 139*      Mixed anion gap acidosis.  -Improved respiratory function, monitor.  Anion gap resolved 1/31     Abdominal discomfort 1/30, resolved  Noted to have some right upper quadrant pain 1/30.  Now resolved.  LFTs normal.  Has been on Zosyn for above surgical issues.   -Monitor abdominal exam  -Antibiotics as above  -Surgery to review and monitor as well     Anemia of chronic disease.  Baseline around 10.  Has been stable in the 8 range.  No signs of bleeding.  Recent Labs   Lab 02/01/25  0605 01/31/25  0424 01/30/25  0429 01/29/25  0355 01/28/25  1347 01/28/25  0425   HGB 9.8* 8.1* 8.6* 8.4* 9.5* 9.2*      Gout  -on prior to admission allopurinol.  Currently on hold,  reassess daily.     History of renal cell carcinoma   -status post right nephrectomy, monitor     History of obstructive sleep apnea.  By report, intolerant of CPAP   - monitor, follow-up with outpatient provider    DVT Prophylaxis  -anticoagulation as above    Full code status  -confirmed by vascular surgery team    Weakness and deconditioning  -PT, OT, speech and language therapy (SLP) consulted  -Increase activity as tolerated    Disposition  Anticipated discharge timing see Disposition Plan below and Medically Ready for Discharge link  Anticipated discharge to transitional care unit   Social work consulted for discharge planning          Diet: Snacks/Supplements Adult: Expedite Cup; Between Meals  Snacks/Supplements Adult: Nepro Oral Supplement; Between Meals  NPO per Anesthesia Guidelines for Procedure/Surgery Except for: Meds    DVT Prophylaxis: IV heparin as of 2/1, or per vascular surgery**  Rosario Catheter: Not present  Lines: None     Cardiac Monitoring: ACTIVE order. Indication: Tachyarrhythmias, acute (48 hours)  Code Status: Full Code      Clinically Significant Risk Factors         # Hyponatremia: Lowest Na = 132 mmol/L in last 2 days, will monitor as appropriate  # Hypochloremia: Lowest Cl = 93 mmol/L in last 2 days, will monitor as appropriate      # Hypoalbuminemia: Lowest albumin = 1.8 g/dL at 1/30/2025  3:17 PM, will monitor as appropriate  # Coagulation Defect: INR = 1.44 (Ref range: 0.85 - 1.15) and/or PTT = 42 Seconds (Ref range: 22 - 38 Seconds), will monitor for bleeding    # Hypertension: Noted on problem list  # Chronic heart failure with reduced ejection fraction: last echo with EF <40%          # DMII: A1C = 6.6 % (Ref range: <5.7 %) within past 6 months    # Severe Malnutrition: based on nutrition assessment    # Financial/Environmental Concerns:           Social Drivers of Health    Tobacco Use: Medium Risk (1/27/2025)    Patient History     Smoking Tobacco Use: Former     Smokeless  Tobacco Use: Never   Alcohol Use: Unknown (11/28/2018)    Received from [facility], [facility] and [facility]    AUDIT-C     Frequency of Alcohol Consumption: Monthly or less     Frequency of Binge Drinking: Never   Transportation Needs: High Risk (1/21/2025)    Transportation Needs     Within the past 12 months, has lack of transportation kept you from medical appointments, getting your medicines, non-medical meetings or appointments, work, or from getting things that you need?: Yes   Physical Activity: Unknown (10/8/2024)    Exercise Vital Sign     Days of Exercise per Week: 0 days   Social Connections: Unknown (10/8/2024)    Social Connection and Isolation Panel [NHANES]     Frequency of Social Gatherings with Friends and Family: Patient declined          Disposition Plan     Medically Ready for Discharge: Anticipated in 2-4 Days             Larry Askew MD  Hospitalist Service  [facility]  Securely message with [email or web address] (more info)  Text page via [email or web address] Paging/Directory   ______________________________________________________________________    Interval History   First visit with patient today, lying in bed, recent transfer out of ICU.  Appears comfortable, intermittent cough reported.  No nausea or vomiting.  Intermittent right foot pain.  Vascular surgery consulted and following along with nephrology.  Patient discussed with vascular surgery team today, Dr.Z. Carrion.    Physical Exam   Vital Signs: Temp: 97.9  F (36.6  C) Temp src: Axillary BP: 118/88 Pulse: 101   Resp: 21 SpO2: 93 % O2 Device: Nasal cannula Oxygen Delivery: 2 LPM  Weight: 178 lbs 2.11 oz    GENERAL awake and alert, no acute distress  LUNGS no wheezes or crackles  HEART S1, S2 with RRR  ABDOMEN soft, nontender  EXTREMITIES with mild lower extremity edema  SKIN warm and dry; right leg bandaged with vascular boot in place, as per surgery**  NEURO moves upper  and lower extremities spontaneously and to command  MENTAL STATUS answering questions and following simple commands    Medical Decision Making             Data     I have personally reviewed the following data over the past 24 hrs:    18.2 (H)  \   9.8 (L)   / 319     132 (L) 93 (L) 58.5 (H) /  194 (H)   4.4 22 6.53 (H) \     TSH: N/A T4: N/A A1C: N/A     Procal: N/A CRP: N/A Lactic Acid: 0.7       INR:  1.44 (H) PTT:  N/A   D-dimer:  N/A Fibrinogen:  N/A       Imaging results reviewed over the past 24 hrs:   Recent Results (from the past 24 hours)   XR Chest 2 Views    Narrative    EXAM: XR CHEST 2 VIEWS  LOCATION: [facility]  DATE: 2/1/2025    INDICATION: Follow up pleural effusion assess for thoracentesis  COMPARISON: Chest radiograph 1/30/2025.      Impression    IMPRESSION: Similar to slightly increased moderate to large layering right pleural effusion with presumed adjacent atelectasis. Trace left pleural effusion. Interval removal of the right central venous catheter. No discernible pneumothorax. Similar   cardiomediastinal silhouette. Remote left rib fracture.   CT Chest w/o Contrast    Narrative    EXAM: CT CHEST W/O CONTRAST  LOCATION: [facility]  DATE: 2/1/2025    INDICATION: worsening pleural effusion pneumonia. Shortness of breath.  COMPARISON: 1/3/2025  TECHNIQUE: CT chest without IV contrast. Multiplanar reformats were obtained. Dose reduction techniques were used.  CONTRAST: None.    FINDINGS:   LUNGS AND PLEURA: A large right pleural effusion has further increased in size and occupies greater than 50% of the right hemithorax. There is near complete collapse of the right lower lobe and partial collapse of the right middle lobe. A small left   pleural effusion is unchanged. Mild interstitial opacities in both lungs are nonspecific and may be due to pulmonary edema. No central mucous plugging.    MEDIASTINUM/AXILLAE: Cardiomegaly. No pericardial  effusions. Mild calcified plaque in the thoracic aorta. Moderate bilateral gynecomastia.    CORONARY ARTERY CALCIFICATION: Severe.    UPPER ABDOMEN: Small amount of free fluid in the upper abdomen is incompletely visualized but does not appear significant changed.    MUSCULOSKELETAL: Degenerative hypertrophic changes in the thoracic spine. Multiple subacute posterolateral right  (7, 9, 10 th) rib fractures. Additional old healed fractures bilaterally.      Impression    IMPRESSION:   1.  A large right pleural effusion has further increased in size and occupies greater than 50% of the right hemithorax. There is near complete collapse of the right lower lobe and partial collapse of the right middle lobe.  2.  A small left pleural effusion is unchanged.  3.  Mild interstitial opacities in both lungs are nonspecific but may be due to pulmonary edema.  4.  Cardiomegaly.  5.  Multiple subacute posterolateral right rib fractures.

## 2025-02-01 NOTE — PLAN OF CARE
DATE & TIME: 25 [id number]    Cognitive Concerns/ Orientation : Aox4; calm and cooperative   BEHAVIOR & AGGRESSION TOOL COLOR: Green   ABNL VS/O2: VSS on 2L NC  MOBILITY: Ax2, not OOB. Toe touch weight bearing to RLE. T/R q2h while in bed.   PAIN MANAGMENT: Denies pain  DIET: Mechanical soft, renal  BOWEL/BLADDER: Continent, uses urinal, on PD. Last BM 25, encourage stool medications.   ABNL LAB/BG: Na: 132, Cr: 6.88, phosph: 5.4, WBC: 14.9, hgb: 8.1, B  DRAIN/DEVICES: R PIV infusing hep @ 1900 U/hr, L PIV SL. PD catheter clamped.   TELEMETRY RHYTHM: ST w/ 1st degree AVB.   SKIN:  R heel/leg ulcer, WOC following, dressing changed this shift. Scattered scabs and bruising. Blanchable redness to sacrum. Old incisions from previous procedure on LLE. Doppler pulses, RLE cool to touch. RLE popliteal incision and R groin incision, CDI.   TESTS/PROCEDURES: Possible thoracentesis 25. Echo completed.   D/C DATE: Pending improvement  OTHER IMPORTANT INFO: Vascular surg, nephrology, ID, podiatry, palliative, PT/OT /SW following

## 2025-02-01 NOTE — PHARMACY-VANCOMYCIN DOSING SERVICE
"Pharmacy Vancomycin Initial Note  Date of Service 2025  Patient's  1960  64 year old, male    Indication:  possible MRSA pneumonia    Current estimated CrCl = Estimated Creatinine Clearance: 12.9 mL/min (A) (based on SCr of 6.53 mg/dL (H)).    Creatinine for last 3 days  2025:  4:29 AM Creatinine 7.06 mg/dL  2025:  4:24 AM Creatinine 6.88 mg/dL  2025:  6:05 AM Creatinine 6.53 mg/dL    Recent Vancomycin Level(s) for last 3 days  No results found for requested labs within last 3 days.      Vancomycin IV Administrations (past 72 hours)        No vancomycin orders with administrations in past 72 hours.                    Nephrotoxins and other renal medications (From now, onward)      Start     Dose/Rate Route Frequency Ordered Stop    25 1300  vancomycin (VANCOCIN) 1,500 mg in 0.9% NaCl 265 mL intermittent infusion         1,500 mg  over 90 Minutes Intravenous ONCE 25 1209      25 1800  piperacillin-tazobactam (ZOSYN) 2.25 g vial to attach to  ml bag        Note to Pharmacy: For [facility], [facility] and [facility]: For Zosyn-naive patients, use the \"Zosyn initial dose + extended infusion\" order panel.    2.25 g  over 30 Minutes Intravenous EVERY 8 HOURS 25 1509 25 0359            Contrast Orders - past 72 hours (72h ago, onward)      Start     Dose/Rate Route Frequency Stop    25 1000  perflutren diluted in saline (DEFINITY) injection 2 mL        Note to Pharmacy: NDC# [id number]    2 mL Intravenous ONCE 25 0932            Plan:  Start vancomycin  1500 mg IV once.   Vancomycin monitoring method: Trough (Method 2 = manual dose calculation)  Vancomycin therapeutic monitoring goal: 15-20 mg/L  Pharmacy will check vancomycin levels as appropriate in 1-3 Days.    Serum creatinine levels will be ordered daily for the first week of therapy and at least twice weekly for subsequent weeks.      Matthew Diane [facility]    "

## 2025-02-01 NOTE — PLAN OF CARE
[id number]  A/OX4,VSS. Diabetic and insulin not needed at bedtime. Heparin @ 1900 units/hr,next Hep XA check at 2300. On IV antibiotics. Right foot dreg CDI, rooke boot on. Right  popliteal  and R groin incision CDI. Vascular,podiatry and nephrology following. Pt on PD now. Plan for Thoracentesis tomorrow. Micro turns.

## 2025-02-01 NOTE — PLAN OF CARE
Goal Outcome Evaluation:         DATE & TIME: 25-25 23:00-07:30      Cognitive Concerns/ Orientation : A&Ox4; calm and cooperative,   BEHAVIOR & AGGRESSION TOOL COLOR: Green  ABNL VS/O2: VSS on 1L NC 94%, (+) productive cough. increased oral secretions, patient self suctioning  MOBILITY: Ax2, not OOB. Toe touch weight bearing to RLE. T/R q2h while in bed.   PAIN MANAGMENT: Pain on R leg only on movement, placed pillow for comfort. Scheduled tylenol given   DIET: Mechanical soft, renal  BOWEL/BLADDER: Continent, uses urinal, on PD.   ABNL LAB/BG: Na: 132, Cr: 6.88, phosph: 5.4, WBC: 14.9, hgb: 8.1, B  DRAIN/DEVICES: R PIV infusing hep @ 1900 U/hr, L PIV SL. PD catheter-ongoing dialysis  TELEMETRY RHYTHM: ST w/ 1st degree AVB.   SKIN:  R heel/leg ulcer-JUAN CARLOS due to leg tenderness. Scattered scabs and bruising. Blanchable redness to sacrum. Old incisions from previous procedure on LLE. RLE cool to touch. RLE popliteal incision and R groin incision, CDI.   TESTS/PROCEDURES: Possible thoracentesis 25. - Provider to call IR if to do procedure over the weekend  D/C DATE: Pending improvement  OTHER IMPORTANT INFO: Vascular surg, nephrology, ID, podiatry, palliative, PT/OT /SW following. WOC ff. HepXa 0.37-within range, maintain Heparin infusion at 1900units, next check on 25

## 2025-02-01 NOTE — PROGRESS NOTES
Patient went down to CT.   I reviewed his chart.  Total UF ~ 2.1 liters  PD order placed for tonight. Will use all 2.5% Dextrose

## 2025-02-02 ENCOUNTER — APPOINTMENT (OUTPATIENT)
Dept: ULTRASOUND IMAGING | Facility: CLINIC | Age: 65
DRG: 853 | End: 2025-02-02
Attending: HOSPITALIST
Payer: MEDICARE

## 2025-02-02 ENCOUNTER — APPOINTMENT (OUTPATIENT)
Dept: SPEECH THERAPY | Facility: CLINIC | Age: 65
DRG: 853 | End: 2025-02-02
Attending: INTERNAL MEDICINE
Payer: MEDICARE

## 2025-02-02 LAB
ANION GAP SERPL CALCULATED.3IONS-SCNC: 15 MMOL/L (ref 7–15)
BACTERIA BLD CULT: NO GROWTH
BACTERIA BLD CULT: NO GROWTH
BUN SERPL-MCNC: 57.8 MG/DL (ref 8–23)
CALCIUM SERPL-MCNC: 9.2 MG/DL (ref 8.8–10.4)
CHLORIDE SERPL-SCNC: 96 MMOL/L (ref 98–107)
CREAT SERPL-MCNC: 6.31 MG/DL (ref 0.67–1.17)
EGFRCR SERPLBLD CKD-EPI 2021: 9 ML/MIN/1.73M2
ERYTHROCYTE [DISTWIDTH] IN BLOOD BY AUTOMATED COUNT: 18.5 % (ref 10–15)
GLUCOSE BLDC GLUCOMTR-MCNC: 100 MG/DL (ref 70–99)
GLUCOSE BLDC GLUCOMTR-MCNC: 125 MG/DL (ref 70–99)
GLUCOSE BLDC GLUCOMTR-MCNC: 139 MG/DL (ref 70–99)
GLUCOSE BLDC GLUCOMTR-MCNC: 149 MG/DL (ref 70–99)
GLUCOSE BLDC GLUCOMTR-MCNC: 90 MG/DL (ref 70–99)
GLUCOSE BODY FLUID SOURCE: NORMAL
GLUCOSE FLD-MCNC: 133 MG/DL
GLUCOSE SERPL-MCNC: 158 MG/DL (ref 70–99)
HCO3 SERPL-SCNC: 22 MMOL/L (ref 22–29)
HCT VFR BLD AUTO: 27.3 % (ref 40–53)
HGB BLD-MCNC: 9.1 G/DL (ref 13.3–17.7)
LACTATE SERPL-SCNC: 1.1 MMOL/L (ref 0.7–2)
LD BODY BODY FLUID SOURCE: NORMAL
LDH FLD L TO P-CCNC: 286 U/L
LDH SERPL L TO P-CCNC: 132 U/L (ref 0–250)
MAGNESIUM SERPL-MCNC: 2 MG/DL (ref 1.7–2.3)
MCH RBC QN AUTO: 31.3 PG (ref 26.5–33)
MCHC RBC AUTO-ENTMCNC: 33.3 G/DL (ref 31.5–36.5)
MCV RBC AUTO: 94 FL (ref 78–100)
PHOSPHATE SERPL-MCNC: 6 MG/DL (ref 2.5–4.5)
PLATELET # BLD AUTO: 306 10E3/UL (ref 150–450)
POTASSIUM SERPL-SCNC: 4 MMOL/L (ref 3.4–5.3)
PROCALCITONIN SERPL IA-MCNC: 1.26 NG/ML
PROT FLD-MCNC: 1.8 G/DL
PROT SERPL-MCNC: 4.7 G/DL (ref 6.4–8.3)
PROTEIN BODY FLUID SOURCE: NORMAL
RBC # BLD AUTO: 2.91 10E6/UL (ref 4.4–5.9)
SODIUM SERPL-SCNC: 133 MMOL/L (ref 135–145)
UFH PPP CHRO-ACNC: 0.35 IU/ML
UFH PPP CHRO-ACNC: 0.38 IU/ML
WBC # BLD AUTO: 14.9 10E3/UL (ref 4–11)

## 2025-02-02 PROCEDURE — 250N000011 HC RX IP 250 OP 636: Performed by: INTERNAL MEDICINE

## 2025-02-02 PROCEDURE — 87070 CULTURE OTHR SPECIMN AEROBIC: CPT | Performed by: HOSPITALIST

## 2025-02-02 PROCEDURE — 250N000013 HC RX MED GY IP 250 OP 250 PS 637: Performed by: INTERNAL MEDICINE

## 2025-02-02 PROCEDURE — 250N000009 HC RX 250: Performed by: INTERNAL MEDICINE

## 2025-02-02 PROCEDURE — 88112 CYTOPATH CELL ENHANCE TECH: CPT | Mod: TC | Performed by: HOSPITALIST

## 2025-02-02 PROCEDURE — 250N000013 HC RX MED GY IP 250 OP 250 PS 637: Performed by: HOSPITALIST

## 2025-02-02 PROCEDURE — 83615 LACTATE (LD) (LDH) ENZYME: CPT | Performed by: HOSPITALIST

## 2025-02-02 PROCEDURE — 99233 SBSQ HOSP IP/OBS HIGH 50: CPT | Performed by: HOSPITALIST

## 2025-02-02 PROCEDURE — 92526 ORAL FUNCTION THERAPY: CPT | Mod: GN

## 2025-02-02 PROCEDURE — 85018 HEMOGLOBIN: CPT | Performed by: INTERNAL MEDICINE

## 2025-02-02 PROCEDURE — 82945 GLUCOSE OTHER FLUID: CPT | Performed by: HOSPITALIST

## 2025-02-02 PROCEDURE — 85520 HEPARIN ASSAY: CPT | Performed by: HOSPITALIST

## 2025-02-02 PROCEDURE — 89051 BODY FLUID CELL COUNT: CPT | Performed by: HOSPITALIST

## 2025-02-02 PROCEDURE — 120N000013 HC R&B IMCU

## 2025-02-02 PROCEDURE — 272N000042 US THORACENTESIS

## 2025-02-02 PROCEDURE — 85520 HEPARIN ASSAY: CPT | Performed by: INTERNAL MEDICINE

## 2025-02-02 PROCEDURE — 99232 SBSQ HOSP IP/OBS MODERATE 35: CPT | Performed by: INTERNAL MEDICINE

## 2025-02-02 PROCEDURE — 83605 ASSAY OF LACTIC ACID: CPT | Performed by: HOSPITALIST

## 2025-02-02 PROCEDURE — 84100 ASSAY OF PHOSPHORUS: CPT | Performed by: INTERNAL MEDICINE

## 2025-02-02 PROCEDURE — 84145 PROCALCITONIN (PCT): CPT | Performed by: HOSPITALIST

## 2025-02-02 PROCEDURE — 99222 1ST HOSP IP/OBS MODERATE 55: CPT | Performed by: INTERNAL MEDICINE

## 2025-02-02 PROCEDURE — 92610 EVALUATE SWALLOWING FUNCTION: CPT | Mod: GN

## 2025-02-02 PROCEDURE — 90945 DIALYSIS ONE EVALUATION: CPT

## 2025-02-02 PROCEDURE — 89050 BODY FLUID CELL COUNT: CPT | Performed by: HOSPITALIST

## 2025-02-02 PROCEDURE — 250N000011 HC RX IP 250 OP 636: Performed by: HOSPITALIST

## 2025-02-02 PROCEDURE — 80048 BASIC METABOLIC PNL TOTAL CA: CPT | Performed by: INTERNAL MEDICINE

## 2025-02-02 PROCEDURE — 84157 ASSAY OF PROTEIN OTHER: CPT | Performed by: HOSPITALIST

## 2025-02-02 PROCEDURE — 84155 ASSAY OF PROTEIN SERUM: CPT | Performed by: HOSPITALIST

## 2025-02-02 PROCEDURE — 83735 ASSAY OF MAGNESIUM: CPT | Performed by: INTERNAL MEDICINE

## 2025-02-02 PROCEDURE — 36415 COLL VENOUS BLD VENIPUNCTURE: CPT | Performed by: HOSPITALIST

## 2025-02-02 PROCEDURE — 272N000706 US THORACENTESIS

## 2025-02-02 PROCEDURE — 0W993ZZ DRAINAGE OF RIGHT PLEURAL CAVITY, PERCUTANEOUS APPROACH: ICD-10-PCS | Performed by: RADIOLOGY

## 2025-02-02 RX ORDER — PIPERACILLIN SODIUM, TAZOBACTAM SODIUM 2; .25 G/10ML; G/10ML
2.25 INJECTION, POWDER, LYOPHILIZED, FOR SOLUTION INTRAVENOUS EVERY 8 HOURS
Status: DISCONTINUED | OUTPATIENT
Start: 2025-02-02 | End: 2025-02-02

## 2025-02-02 RX ORDER — GENTAMICIN SULFATE 1 MG/G
CREAM TOPICAL DAILY PRN
Status: DISCONTINUED | OUTPATIENT
Start: 2025-02-02 | End: 2025-02-02

## 2025-02-02 RX ORDER — LIDOCAINE HYDROCHLORIDE 10 MG/ML
10 INJECTION, SOLUTION EPIDURAL; INFILTRATION; INTRACAUDAL; PERINEURAL ONCE
Status: COMPLETED | OUTPATIENT
Start: 2025-02-02 | End: 2025-02-02

## 2025-02-02 RX ADMIN — ACETAMINOPHEN 975 MG: 325 TABLET, FILM COATED ORAL at 12:28

## 2025-02-02 RX ADMIN — Medication 12.5 MG: at 09:18

## 2025-02-02 RX ADMIN — ACETAMINOPHEN 975 MG: 325 TABLET, FILM COATED ORAL at 20:30

## 2025-02-02 RX ADMIN — HEPARIN SODIUM 1900 UNITS/HR: 10000 INJECTION, SOLUTION INTRAVENOUS at 21:03

## 2025-02-02 RX ADMIN — SEVELAMER CARBONATE 800 MG: 800 TABLET, FILM COATED ORAL at 18:31

## 2025-02-02 RX ADMIN — GENTAMICIN SULFATE: 1 CREAM TOPICAL at 20:40

## 2025-02-02 RX ADMIN — MIDODRINE HYDROCHLORIDE 10 MG: 2.5 TABLET ORAL at 18:31

## 2025-02-02 RX ADMIN — MIDODRINE HYDROCHLORIDE 10 MG: 2.5 TABLET ORAL at 09:18

## 2025-02-02 RX ADMIN — ATORVASTATIN CALCIUM 40 MG: 40 TABLET, FILM COATED ORAL at 20:30

## 2025-02-02 RX ADMIN — SEVELAMER CARBONATE 800 MG: 800 TABLET, FILM COATED ORAL at 12:28

## 2025-02-02 RX ADMIN — LIDOCAINE HYDROCHLORIDE 10 ML: 10 INJECTION, SOLUTION EPIDURAL; INFILTRATION; INTRACAUDAL; PERINEURAL at 11:43

## 2025-02-02 RX ADMIN — ACETAMINOPHEN 975 MG: 325 TABLET, FILM COATED ORAL at 04:17

## 2025-02-02 RX ADMIN — SENNOSIDES AND DOCUSATE SODIUM 1 TABLET: 50; 8.6 TABLET ORAL at 20:30

## 2025-02-02 RX ADMIN — Medication 5 MG: at 20:30

## 2025-02-02 RX ADMIN — SENNOSIDES AND DOCUSATE SODIUM 1 TABLET: 50; 8.6 TABLET ORAL at 09:17

## 2025-02-02 RX ADMIN — PIPERACILLIN AND TAZOBACTAM 2.25 G: 2; .25 INJECTION, POWDER, FOR SOLUTION INTRAVENOUS at 09:18

## 2025-02-02 RX ADMIN — POLYETHYLENE GLYCOL 3350 17 G: 17 POWDER, FOR SOLUTION ORAL at 09:19

## 2025-02-02 RX ADMIN — SEVELAMER CARBONATE 800 MG: 800 TABLET, FILM COATED ORAL at 09:17

## 2025-02-02 RX ADMIN — DEXTROSE 30 G: 15 GEL ORAL at 17:53

## 2025-02-02 RX ADMIN — Medication 1 CAPSULE: at 09:18

## 2025-02-02 RX ADMIN — PANTOPRAZOLE SODIUM 40 MG: 40 INJECTION, POWDER, FOR SOLUTION INTRAVENOUS at 09:18

## 2025-02-02 RX ADMIN — HEPARIN SODIUM 1900 UNITS/HR: 10000 INJECTION, SOLUTION INTRAVENOUS at 06:32

## 2025-02-02 RX ADMIN — CALCITRIOL CAPSULES 0.25 MCG 0.25 MCG: 0.25 CAPSULE ORAL at 20:30

## 2025-02-02 RX ADMIN — CLOPIDOGREL BISULFATE 75 MG: 75 TABLET ORAL at 20:30

## 2025-02-02 ASSESSMENT — ACTIVITIES OF DAILY LIVING (ADL)
ADLS_ACUITY_SCORE: 62
ADLS_ACUITY_SCORE: 65
ADLS_ACUITY_SCORE: 62
ADLS_ACUITY_SCORE: 65
ADLS_ACUITY_SCORE: 62
ADLS_ACUITY_SCORE: 65
ADLS_ACUITY_SCORE: 62
ADLS_ACUITY_SCORE: 65
ADLS_ACUITY_SCORE: 62
ADLS_ACUITY_SCORE: 65

## 2025-02-02 NOTE — PROGRESS NOTES
Cycler set up per protocol and per treatment orders     Results from previous treatment:  Effluent color and clarity: yellow, clear, no foul odor  Total UF: 1329ml  Initial Drain: 32ml  Avg Dwell: 1:24  Added Dwell: 7 min    Cycler Serial Number: [id number]  Total Treatment Volume: [id number]  Total treatment time: 9 hrs  Fill Volume: 2000ml  Last Fill Volume:0ml  Heater ba.5% 6000mL;  Lot #: [id number];  Expiration Date: 2026  Side Bag #1: 2.5% 6000mL;  Lot #: [id number];  Expiration Date: 2026  Number of cycles including final fill: 5  Dwell Time: 1:22 minutes  Last Fill Volume: none  Initial Drain Alarm: 0ml  PD orders reviewed with Patient procedure and ESRD teaching done and questions answered.  Cycler ready for hook-up.    Report given to: GERALDO Payan RN

## 2025-02-02 NOTE — PROGRESS NOTES
[facility]    Medicine Progress Note - Hospitalist Service    Date of Admission:  1/21/2025    Assessment & Plan   Arnulfo Pritchett is a 64 year old male with past medical history of severe heart failure (EF 30%), ESRD (on peritoneal dialysis), chronic paroxysmal AF on warfarin, LLE PAD s/p endarterectomy, RCC s/p nephrectomy admitted on 1/21/2025 for septic shock secondary to right leg PAD with worsening right heel necrotic ulcer. The patient was seen in ED at outside hospital on 1/3/25 and diagnosed with pneumonia, later completing course of Cefdinir. He then presented on 1/21/25 to outside hospital for right leg pain and found to have right leg ulcer and transferred to [facility] for vascular surgery evaluation. Now s/p right common femoral BK popliteal/tibial endarterectomy and right common femoral to below-knee popliteal/tibial PTFE bypass performed on 1/27/25. The patient was admitted to the ICU for requirement of mechanical ventilation and pressor support following surgery for multifactorial shock.      Hospitalist service consulted 1/31/2025 for transfer out of ICU and to assist with medical management along with vascular surgery. Infectious disease also consulted.     Hx PAD of LLE s/p endarterectomy and fem-tibioperoneal trunk bypass on 10/25/24  Hx RLE arterial occlusion s/p SFA angioplasty and stent 5/2024  PAD RLE, right heel gangrene, occluded SFA, no evidence of osteomyelitis  s/p right common femoral and popliteal/tibial endarterectomy, right femoral to popliteal/tibial PTFE bypass 1/27/25   -found to have critical limb ischemia on admission on January 21, for details see admission note.   -Distributive shock following surgery followed by the ICU service until 1/31  -Followed by vascular surgery, podiatry, wound care, and ID  -Coumadin has been held since admission, recently on IV heparin, vascular surgery to review when to restart warfarin**  -Has been maintained on statin and  Plavix 75 mg daily during this hospital stay  -Recent IV piperacillin/tazobactam (Zosyn), discontinued 2/2 per ID; follow-up cultures, monitor off antibiotics  -Any future plans for surgical debridement as per vascular surgery and podiatry  -Wound care per surgery and WOC  -Pain control, see hospital orders  -AM labs     Multifactorial shock, resolved  ICU admission, weaned off vasopressors 1/30.  -Transitioned to midodrine with increase in prior to admission dose on1/31.  -Monitor blood pressure and heart rate  -Antibiotics as above     End-stage renal disease (ESRD), on peritoneal dialysis  Hx of secondary hyperparathyroidism and hyperphosphatemia, phosphorous elevated above baseline of 6 at most recent of 7.9   Nephrology consulted, ongoing follow-up.  Did not require hemodialysis this hospital stay.  -Ongoing peritoneal dialysis, as per nephrology  -Continue midodrine, nephrology to review, monitor blood pressure  -Visit of Dr. Acuna 2/2 reviewed, appreciated     Acute hypoxemic respiratory failure  Right pleural effusion  Status post thoracentesis 1/24/25, repeat 2/2/25  -Following surgery on admission by vascular surgery was intubated.  Recent pneumonia treated with 3 weeks of Ceftin prior to this admission.  No concern for pneumonia per intensivist service.  -Extubated 1/29/25  -Right pleural effusion.  Status post thoracentesis1/24/25 with 1.5 L clear yellow fluid removed, likely transudative based on low cell count of 117, , and protein of 1.7. Possibly secondary to reduced peritoneal dialysis during hospitalization vs underlying severe HFrEF (30%)   -CT chest 2/1/25, see report, no clear pneumonia; large right pleural effusion noted  -Wean down oxygen as able, encourage incentive spirometry  -Antibiotics as above, discontinued by ID 2/2  -ID consulted 2/1 as above, ongoing follow-up  -s/p repeat Right thoracentesis on 2/2 with 2.3 L of rolan fluid removed; additional studies, cultures, cytology  PENDING - needs review**    Paroxysmal atrial fibrillation  Chronic anticoagulation prior to admission, warfarin - ON HOLD  Ischemic cardiomyopathy with HLD, CAD s/p multiple stents, and severe EFrEF (30%)   Wide complex tachycardia 2/1/25  Assessment-postoperatively has been maintained on heparin drip.  Coumadin has been held during his hospital stay.  -Echo 1/31-EF 25 to 30%.  Severe global hypokinesis with abnormal septal motion consistent with conduction abnormality.  Severe inferior wall hypokinesis.  LVH.  RV mildly dilated and mildly reduced RV function.  Mild mitral regurgitation.  Mild to moderate mitral stenosis.  Moderate aortic stenosis.  Left ventricular fairly pressures elevated.  -Episode of wide-complex tachycardia noted by nursing staff, 15 beats, up on 2/1/2025  -Continue inpatient telemetry  -Twelve-lead EKG 2/1  -Consider follow-up cardiology consultation if recurrent wide-complex tachycardia in the setting of left ventricular dysfunction  -Restart prior to admission beta-blocker, metoprolol, at adjusted dose, monitoring heart rate and blood pressure**  -Continue IV heparin anticoagulation, with future transition to oral Coumadin as prior to admission as per vascular surgery     Moderate protein calorie nutrition  -Followed by nutrition  -speech and language therapy (SLP) consulted, diet per speech therapy     Hyperglycemia  Type 2 diabetes   hemoglobin A1c 5.7% October 24  On insulin prior to admission.  -Monitor blood sugars  -Continue insulin, adjust as needed  Recent Labs   Lab 02/02/25  0916 02/02/25  0658 02/02/25  0205 02/01/25  2153 02/01/25  1815 02/01/25  1736   * 158* 139* 106* 96 67*      Mixed anion gap acidosis.  -Improved respiratory function, monitor.  Anion gap resolved 1/31     Abdominal discomfort 1/30, resolved  Noted to have some right upper quadrant pain 1/30.  Now resolved.  LFTs normal.  Has been on Zosyn for above surgical issues.   -Monitor abdominal  exam  -Antibiotics as above  -Surgery to review and monitor as well     Anemia of chronic disease.  Baseline around 10.  Has been stable in the 8 range.  No signs of bleeding.  Recent Labs   Lab 02/02/25  0658 02/01/25  0605 01/31/25  0424 01/30/25  0429 01/29/25  0355 01/28/25  1347   HGB 9.1* 9.8* 8.1* 8.6* 8.4* 9.5*      Gout  -on prior to admission allopurinol.  Currently on hold, reassess daily.     History of renal cell carcinoma   -status post right nephrectomy, monitor     History of obstructive sleep apnea.  By report, intolerant of CPAP   - monitor, follow-up with outpatient provider    DVT Prophylaxis  -anticoagulation as above    Full code status  -confirmed by vascular surgery team    Weakness and deconditioning  -PT, OT, speech and language therapy (SLP) consulted  -Increase activity as tolerated    Disposition  Anticipated discharge timing see Disposition Plan below and Medically Ready for Discharge link  Anticipated discharge to transitional care unit   Social work consulted for discharge planning    Change in hospitalist provider tomorrow with one of my [facility] hospitalist colleagues assuming care.          Diet: Snacks/Supplements Adult: Expedite Cup; Between Meals  Snacks/Supplements Adult: Nepro Oral Supplement; Between Meals  Combination Diet Pureed Diet (level 4); Thin Liquids (level 0)    DVT Prophylaxis: IV heparin as of 2/2, or per vascular surgery**  Rosario Catheter: Not present  Lines: None     Cardiac Monitoring: ACTIVE order. Indication: Tachyarrhythmias, acute (48 hours)  Code Status: Full Code      Clinically Significant Risk Factors         # Hyponatremia: Lowest Na = 132 mmol/L in last 2 days, will monitor as appropriate  # Hypochloremia: Lowest Cl = 93 mmol/L in last 2 days, will monitor as appropriate      # Hypoalbuminemia: Lowest albumin = 1.8 g/dL at 1/30/2025  3:17 PM, will monitor as appropriate    # Coagulation Defect: INR = 1.44 (Ref range: 0.85 - 1.15) and/or PTT  = 42 Seconds (Ref range: 22 - 38 Seconds), will monitor for bleeding    # Hypertension: Noted on problem list  # Chronic heart failure with reduced ejection fraction: last echo with EF <40%          # DMII: A1C = 6.6 % (Ref range: <5.7 %) within past 6 months    # Severe Malnutrition: based on nutrition assessment      # Financial/Environmental Concerns:           Social Drivers of Health    Tobacco Use: Medium Risk (1/27/2025)    Patient History     Smoking Tobacco Use: Former     Smokeless Tobacco Use: Never   Alcohol Use: Unknown (11/28/2018)    [facility] from [facility] and [facility], [facility]    AUDIT-C     Frequency of Alcohol Consumption: Monthly or less     Frequency of Binge Drinking: Never   Transportation Needs: High Risk (1/21/2025)    Transportation Needs     Within the past 12 months, has lack of transportation kept you from medical appointments, getting your medicines, non-medical meetings or appointments, work, or from getting things that you need?: Yes   Physical Activity: Unknown (10/8/2024)    Exercise Vital Sign     Days of Exercise per Week: 0 days   Social Connections: Unknown (10/8/2024)    Social Connection and Isolation Panel [NHANES]     Frequency of Social Gatherings with Friends and Family: Patient declined          Disposition Plan     Medically Ready for Discharge: Anticipated in 2-4 Days             Larry Askew MD  Hospitalist Service  [facility] [place]  Securely message with [employer] (more info)  Text page via [facility] Paging/Directory   ______________________________________________________________________    Interval History   Lying in bed, pleasant and interactive, plans for thoracentesis today per recommendations of vascular surgery and infectious disease.  CT chest completed yesterday.  No increase shortness of breath reported.  Pain controlled.  Nursing staff at the bedside.  No new symptoms  reported.    Physical Exam   Vital Signs: Temp: 97.8  F (36.6  C) Temp src: Axillary BP: 111/77 Pulse: 96   Resp: 12 SpO2: 92 % O2 Device: None (Room air) Oxygen Delivery: 1/2 LPM  Weight: 182 lbs 8.65 oz    GENERAL awake and alert, no acute distress, pleasant and interactive   LUNGS no wheezes or crackles  HEART S1, S2 with RRR  ABDOMEN soft, nontender  EXTREMITIES with mild lower extremity edema  SKIN warm and dry; right leg bandaged with vascular boot in place, as per surgery**  NEURO moves upper and lower extremities spontaneously and to command  MENTAL STATUS answering questions and following simple commands    Medical Decision Making             Data     I have personally reviewed the following data over the past 24 hrs:    14.9 (H)  \   9.1 (L)   / 306     133 (L) 96 (L) 57.8 (H) /  125 (H)   4.0 22 6.31 (H) \     Procal: 1.26 (H) CRP: N/A Lactic Acid: 1.1         Imaging results reviewed over the past 24 hrs:   Recent Results (from the past 24 hours)   US Thoracentesis    Narrative    ULTRASOUND GUIDED THORACENTESIS  2/2/2025 12:11 PM CST     HISTORY: ; large right pleural effusion, rule out infection, diagnostic and therapeutic;     FINDINGS: Limited ultrasound was performed to evaluate for the presence and best approach for drainage of a pleural effusion. An image is archived. Written and oral informed consent was obtained. A pause for the cause procedure to verify the correct   patient and correct procedure.     The skin overlying the right chest posteriorly was prepped and draped in the usual sterile fashion. The subcutaneous tissues were anesthetized with 1% lidocaine. Under direct ultrasound guidance a catheter was advanced into the pleural space and 2300 mL   of  rolan colored fluid was drained. The catheter was removed and a sterile dressing was applied.     Patient was monitored by nurse under my direct supervision throughout the exam. Ultrasound images were permanently stored.  There were no  immediate complications. Patient left the ultrasound suite in satisfactory condition.      Impression    IMPRESSION: Technically successful thoracentesis without immediate complications.

## 2025-02-02 NOTE — PROVIDER NOTIFICATION
"Paged Holly:    \"Please reorder daily PRN gentamicin cream for PD dressing changes, thanks!\"    Response:  Ordered.  "

## 2025-02-02 NOTE — CONSULTS
[facility]    Infectious Disease Consultation     Date of Admission:  1/21/2025  Date of Consult (When I saw the patient): 02/01/25    Assessment & Plan   Arnulfo Pritchett is a 64 year old male who was admitted on 1/21/2025.     Impression:  63 yo with  severe heart failure (EF 30%), ESRD (on peritoneal dialysis), chronic paroxysmal AF on warfarin, LLE PAD s/p endarterectomy, RCC s/p nephrectomy  Admitted on 1/21/2025 for septic shock secondary to right leg PAD with worsening right heel necrotic ulcer.   Now s/p right common femoral BK popliteal/tibial endarterectomy and right common femoral to below-knee popliteal/tibial PTFE bypass performed on 1/27/25.   Reports of worsening cough, shortness of breath with imaging with layering of the pleural effusion   MRSA nares positive   On zosyn for 11 days   Eschar on the right heel   Leucocytosis, hard to say if indeed an indicator of worsening infection in a patient with multiple comorbidities and renal failure  proCal of 1.26 of unclear significance and end-stage renal disease patient's    Recommendations  On CT chest yesterday no real concern for pneumonia  Symptoms because of large right pleural effusion which are already better today after thoracocentesis  No fever  Fluid cultures pending repeat blood cultures are negative so far  Stop both vancomycin and Zosyn and watch off antibiotics  If Any positive cultures will reassess      Evelyn Nielsen MD    Reason for Consult   Reason for consult: I was asked to evaluate this patient for pneumonia and leucocytosis .    Primary Care Physician   Chai Griffin    Chief Complaint   Cough   Shortness of breath   On zosyn for 11 days     History is obtained from the patient and medical records    History of Present Illness   Arnulfo Pritchett is a 64 year old male who presents with necrotic right heel ulcer patient has been in the hospital for the past 11 days.  This morning worsening respiratory status.   Patient is colonized with MRSA    Past Medical History   I have reviewed this patient's medical history and updated it with pertinent information if needed.   Past Medical History:   Diagnosis Date    CAD (coronary artery disease)     Chronic systolic heart failure (H)     ESRD (end stage renal disease) on dialysis (H)     Gastroesophageal reflux disease without esophagitis     Gout     HLD (hyperlipidemia)     TALI (obstructive sleep apnea)     PAD (peripheral artery disease)     S/p RLE stenting of SFA/popliteal arteries    Type 2 diabetes mellitus with diabetic neuropathy (H)        Past Surgical History   I have reviewed this patient's surgical history and updated it with pertinent information if needed.  Past Surgical History:   Procedure Laterality Date    AMPUTATE TOE(S) Left 10/27/2024    Procedure: AMPUTATION, TOE left great toe;  Surgeon: Haley Joseph DPM, Podiatry/Foot and Ankle Surgery;  Location: [facility] OR    BYPASS GRAFT FEMOROPOPLITEAL Right 1/27/2025    Procedure: RIGHT FEMORAL ARTERY TO RIGHT POPLITEAL ARTERY ENDARTERECTOMY,  COMMON FEMORAL TO POPLITEAL BELOW KNEE COMPOSITE GREATER SAPHENOUS/PTFE BYPASS GRAFT. GREATER SAPHENOUS VEIN HARVEST;  Surgeon: Patrick Hein MD;  Location: [facility] OR    BYPASS GRAFT FEMOROTIBIAL Left 10/25/2024    Procedure: LEFT FEMORAL ENDARTERECTOMY, LEFT FEMORAL TO TIBIAL PERONEAL TRUNK BYPASS WITH LEFT GREAT SEPHANEOUS VEIN, WOUND VAC PLACEMENT ON LEFT GROIN.;  Surgeon: Raul Gray MD;  Location: [facility] OR    BYPASS GRAFT FEMOROTIBIAL Left 10/27/2024    Procedure: CREATION, BYPASS, VASCULAR, FEMORAL TO TIBIAL REVISION OF BYPASS LEFT COMMON FEMORAL TO TIBIAL.;  Surgeon: Raul Gray MD;  Location:  OR    CV CORONARY ANGIOGRAM N/A 11/27/2024    Procedure: Coronary Angiogram;  Surgeon: Clem Matt MD;  Location:  [facility]    CV LOWER EXTREMITY ANGIOGRAM LEFT Left 10/27/2024    Procedure: Angiogram Lower Extremity Left;   Surgeon: Raul Gray MD;  Location:  OR    CV PCI N/A 11/27/2024    Procedure: Percutaneous Coronary Intervention;  Surgeon: Clem Matt MD;  Location:  [facility] CATH [facility]    IR LOWER EXTREMITY ANGIOGRAM LEFT  10/17/2024    IR LOWER EXTREMITY ANGIOGRAM RIGHT  1/23/2025    OR ANGIOGRAM, LOWER EXTREMITY Right 1/27/2025    Procedure: INTRAOPERATIVE ANGIOGRAM;  Surgeon: Patrick Hein MD;  Location:  OR       Prior to Admission Medications   Prior to Admission Medications   Prescriptions Last Dose Informant Patient Reported? Taking?   B Complex-C-Folic Acid (DIALYVITE) TABS 1/20/2025 Morning Self Yes Yes   Sig: Take 1 tablet by mouth daily.   Continuous Glucose Sensor (DEXCOM G7 SENSOR) MISC  Self No No   Sig: Change every 10 days.   SEVELAMER CARBONATE PO 1/20/2025 Evening Self Yes Yes   Sig: Take 800 mg by mouth 3 times daily (with meals)   acetaminophen (TYLENOL) 500 MG tablet 1/21/2025 Evening Self Yes Yes   Sig: Take 1,000 mg by mouth every 8 hours as needed.   allopurinol (ZYLOPRIM) 100 MG tablet 1/20/2025 Evening Self Yes Yes   Sig: Take 200 mg by mouth every evening   atorvastatin (LIPITOR) 40 MG tablet 1/20/2025 Evening Self Yes Yes   Sig: Take 40 mg by mouth at bedtime.   calcitRIOL (ROCALTROL) 0.25 MCG capsule 1/20/2025 Bedtime Self Yes Yes   Sig: Take 0.25 mcg by mouth at bedtime.   cinacalcet (SENSIPAR) 60 MG tablet 1/20/2025 Evening Self Yes Yes   Sig: Take 60 mg by mouth. Take 1 tablet (60 mg) three times a week: Monday, Wednesday, and Friday nights   clopidogrel (PLAVIX) 75 MG tablet 1/20/2025 Evening Self No Yes   Sig: Take 1 tablet (75 mg) by mouth every evening.   gentamicin (GARAMYCIN) 0.1 % external cream 1/20/2025 Evening Self Yes Yes   Sig: Apply topically daily as needed. Apply topically on peritoneal access site to prevent infections   glucose 40 % (400 mg/mL) gel Unknown Self No Yes   Sig: Take 15-30 g by mouth every 15 minutes as needed for low blood  sugar.   insulin glargine (LANTUS PEN) 100 UNIT/ML pen 1/20/2025 Bedtime Self No Yes   Sig: Inject 35 Units subcutaneously at bedtime.   melatonin 5 MG tablet 1/20/2025 Bedtime Self Yes Yes   Sig: Take 5 mg by mouth at bedtime   metoprolol succinate ER (TOPROL XL) 25 MG 24 hr tablet 1/21/2025 Evening Self No Yes   Sig: Take 1 tablet (25 mg) by mouth daily.   midodrine (PROAMATINE) 2.5 MG tablet Past Month Self No Yes   Sig: Take 1 tablet (2.5 mg) by mouth once as needed (hypotension/dizziness post PD in AM during the day).   omeprazole (PRILOSEC) 20 MG DR capsule 1/20/2025 Morning Self Yes Yes   Sig: Take 20 mg by mouth daily.   warfarin ANTICOAGULANT (COUMADIN) 5 MG tablet 1/19/2025 Evening Self No Yes   Sig: Take 1 tablet (5 mg) by mouth daily.   Patient taking differently: Take 5 mg by mouth five times a week. Tues, Wed, Thurs, Saturdays and Sundays.   warfarin ANTICOAGULANT (COUMADIN) 5 MG tablet 1/20/2025 Evening Self Yes Yes   Sig: Take 7.5 mg by mouth twice a week. Mondays and Fridays..      Facility-Administered Medications: None     Allergies   No Known Allergies    Immunization History   Immunization History   Administered Date(s) Administered    COVID-19 Monovalent 18+ (Moderna) 02/08/2021, 03/04/2021, 08/16/2021    Hepatitis B, Adult 08/03/2021, 10/05/2021, 11/02/2021, 04/05/2022    INFLUENZA,TRIVALENT (FLUCELVAX) 10/03/2018    Influenza Vaccine (Flucelvax Quadrivalent) 10/03/2017, 10/03/2018    Influenza Vaccine 18-64 (Flublok) 10/31/2023    Influenza Vaccine 65+ (Fluzone HD) 10/27/2022    Influenza Vaccine >6 months,quad, PF 09/11/2020, 09/21/2021    Influenza Vaccine, 6+MO IM (QUADRIVALENT W/PRESERVATIVES) 10/03/2017    Influenza, Split Virus, Trivalent, Pf (Fluzone\Fluarix) 11/23/2015    Influenza,INJ,MDCK,PF,Quad >6mo(Flucelvax) 10/03/2018    Pneumo Conj 13-V (2010&after) 06/03/2020    Pneumococcal 23 valent 05/04/2021    TDAP (Adacel,Boostrix) 06/03/2020    Td (Adult), Adsorbed 03/29/2004    Zoster  "recombinant adjuvanted (SHINGRIX) 11/30/2018, 02/05/2019       Social History   I have reviewed this patient's social history and updated it with pertinent information if needed. Arnulfo Pritchett  reports that he has quit smoking. His smoking use included cigarettes. He has never used smokeless tobacco. He reports that he does not currently use alcohol. He reports that he does not use drugs.    Family History   I have reviewed this patient's family history and updated it with pertinent information if needed.   History reviewed. No pertinent family history.    Review of Systems   The 10 point Review of Systems is negative    Physical Exam   Temp: 97.8  F (36.6  C) Temp src: Axillary BP: 114/84 Pulse: 97   Resp: 26 SpO2: 94 % O2 Device: None (Room air) Oxygen Delivery: 1/2 LPM  Vital Signs with Ranges  Temp:  [97.4  F (36.3  C)-98.4  F (36.9  C)] 97.8  F (36.6  C)  Pulse:  [] 97  Resp:  [8-26] 26  BP: ()/(61-99) 114/84  SpO2:  [88 %-99 %] 94 %  182 lbs 8.65 oz  Body mass index is 24.08 kg/m .    GENERAL APPEARANCE:  awake  EYES: Eyes grossly normal to inspection  NECK: no adenopathy  RESP: lungs clear   CV: regular rates and rhythm  LYMPHATICS: normal ant/post cervical and supraclavicular nodes  ABDOMEN: soft, nontender  MS: no edema   SKIN: no suspicious lesions or rashes        Data   All laboratory and imaging data in the past 24 hours reviewed  No results for input(s): \"CULT\" in the last 168 hours.  No lab results found.    Invalid input(s): \"UC\"       All cultures:  Recent Labs   Lab 02/01/25  1206 02/01/25  1200 01/27/25  2247 01/27/25  2242   CULTURE No growth after 12 hours No growth after 12 hours No Growth No Growth      Blood culture:  Results for orders placed or performed during the hospital encounter of 01/21/25   Blood Culture Hand, Right    Collection Time: 02/01/25 12:06 PM    Specimen: Hand, Right; Blood   Result Value Ref Range    Culture No growth after 12 hours    Blood Culture Hand, Left "    Collection Time: 02/01/25 12:00 PM    Specimen: Hand, Left; Blood   Result Value Ref Range    Culture No growth after 12 hours    Blood Culture Hand, Right    Collection Time: 01/27/25 10:47 PM    Specimen: Hand, Right; Blood   Result Value Ref Range    Culture No Growth    Blood Culture Arm, Right    Collection Time: 01/27/25 10:42 PM    Specimen: Arm, Right; Blood   Result Value Ref Range    Culture No Growth    Results for orders placed or performed during the hospital encounter of 11/27/24   Blood Culture Peripheral Blood    Collection Time: 11/27/24 11:07 AM    Specimen: Peripheral Blood   Result Value Ref Range    Culture No Growth    Results for orders placed or performed during the hospital encounter of 07/09/24   Blood Culture Arm, Left    Collection Time: 07/09/24  1:49 PM    Specimen: Arm, Left; Blood   Result Value Ref Range    Culture No Growth    Blood Culture Peripheral Blood    Collection Time: 07/09/24 12:20 PM    Specimen: Peripheral Blood   Result Value Ref Range    Culture No Growth       Urine culture:  No results found for this or any previous visit.

## 2025-02-02 NOTE — PROGRESS NOTES
" Renal Medicine Progress Note            Assessment/Plan:     Arnulfo Pritchett is a 64 year old male CAD, HTN, HLD, pafib, HFrEF (EF 20-25%), ESRD on PD, DM2, TALI, RCC s/p R nephrectomy (2022), PAD with multiple LE procedures      # End stage renal disease on PD  Chronic PD for last 3.5 years.  Home unit [facility] [place], nephrologist Dr. May  Rx: 5 cycles, 2 L fill volumes, 9 hours, last fill 1.2 L with external.     # Moderate R pleural effusion:      # PAD, right heel gangrene, occluded right SFA                              S/P : 1.   Right common femoral and below-knee popliteal/tibial endarterectomy                        2. Right common femoral to below-knee popliteal/tibial composite greater saphenous vein-6 mm   ringed PTFE Propaten bypas  Other issues-  Diabetes mellitus  Chronic paroxysmal atrial fibrillation  Secondary hyperparathyroidism and hyperphosphatemia  Severe heart failure with EF of 30% and mild RV dysfunction.      Plan:  #  PD order placed. Will use one bag of 4.25% dextrose and one bag of 2.5% dextrose  # Getting thoracentesis          Interval History:     \"I feels good,\" he says.  He is going to get a thoracentesis now.   Total UF with PD ~ 1.2 liters with 2.5% dextrose solution.  Denies abdominal pain.           Medications and Allergies:     Current Facility-Administered Medications   Medication Dose Route Frequency Provider Last Rate Last Admin    - MEDICATION INSTRUCTIONS for Dialysis Patients -   Does not apply See Admin Instructions Walter Dempsey MD        acetaminophen (TYLENOL) tablet 975 mg  975 mg Oral or Feeding Tube Q8H Walter Dempsey MD   975 mg at 02/02/25 0417    Or    acetaminophen (TYLENOL) Suppository 650 mg  650 mg Rectal Q8H Walter Dempsey MD        [Held by provider] allopurinol (ZYLOPRIM) tablet 200 mg  200 mg Oral QPM Walter Dempsey MD   200 mg at 01/26/25 1957    atorvastatin (LIPITOR) tablet 40 mg  40 mg Oral or Feeding Tube At Bedtime Ke, " Walter ROMAN MD   40 mg at 02/01/25 2105    calcitRIOL (ROCALTROL) capsule 0.25 mcg  0.25 mcg Oral At Bedtime Walter Dempsey MD   0.25 mcg at 02/01/25 2105    cinacalcet (SENSIPAR) tablet 60 mg  60 mg Oral Q Mon Wed Fri AM Walter Dempsey MD   60 mg at 01/31/25 2019    clopidogrel (PLAVIX) tablet 75 mg  75 mg Oral or Feeding Tube QPM Walter Dempsey MD   75 mg at 02/01/25 2105    insulin aspart (NovoLOG) injection (RAPID ACTING)   Subcutaneous TID w/meals Walter Dempsey MD   6 Units at 02/01/25 0915    insulin aspart (NovoLOG) injection (RAPID ACTING)  1-7 Units Subcutaneous TID AC Walter Dempsey MD   1 Units at 02/01/25 1156    insulin aspart (NovoLOG) injection (RAPID ACTING)  1-5 Units Subcutaneous At Bedtime Walter Dempsey MD        insulin glargine (LANTUS PEN) injection 15 Units  15 Units Subcutaneous Daily Walter Dempsey MD   15 Units at 02/02/25 0919    melatonin tablet 5 mg  5 mg Oral At Bedtime Walter Dempsey MD   5 mg at 02/01/25 2105    metoprolol succinate ER (TOPROL-XL) 24 hr half-tab 12.5 mg  12.5 mg Oral Daily aLrry Askew MD   12.5 mg at 02/02/25 0918    midodrine (PROAMATINE) tablet 10 mg  10 mg Oral BID w/meals Walter Dempsey MD   10 mg at 02/02/25 0918    multivitamin RENAL (TRIPHROCAPS) capsule 1 capsule  1 capsule Oral Daily Walter Dempsey MD   1 capsule at 02/02/25 0918    pantoprazole (PROTONIX) 2 mg/mL suspension 40 mg  40 mg Per Feeding Tube QAM  Walter Dempsey MD   40 mg at 01/28/25 0834    Or    pantoprazole (PROTONIX) IV push injection 40 mg  40 mg Intravenous QAM  Walter Dempsey MD   40 mg at 02/02/25 0918    piperacillin-tazobactam (ZOSYN) 2.25 g vial to attach to  ml bag  2.25 g Intravenous Q8H Larry Askew MD   2.25 g at 02/02/25 0918    polyethylene glycol (MIRALAX) Packet 17 g  17 g Oral or NG Tube Daily Walter Dempsey MD   17 g at 02/02/25 0919    senna-docusate (SENOKOT-S/PERICOLACE) 8.6-50 MG per tablet 1  tablet  1 tablet Oral or NG Tube BID Walter Dempsey MD   1 tablet at 02/02/25 0917    sevelamer carbonate (RENVELA) tablet 800 mg  800 mg Oral TID w/meals Walter Dempsey MD   800 mg at 02/02/25 0917    sodium chloride (PF) 0.9% PF flush 3 mL  3 mL Intracatheter Q8H Milo Carrion MD        sodium chloride (PF) 0.9% PF flush 3 mL  3 mL Intracatheter Q8H Walter Dempsey MD   3 mL at 02/01/25 1145    vancomycin place jin - receiving intermittent dosing  1 each Intravenous See Admin Instructions Melissa Macias MD          No Known Allergies         Physical Exam:   Vitals were reviewed   , Blood pressure 114/84, pulse 97, temperature 97.5  F (36.4  C), temperature source Oral, resp. rate 26, weight 82.8 kg (182 lb 8.7 oz), SpO2 94%.    Wt Readings from Last 3 Encounters:   02/02/25 82.8 kg (182 lb 8.7 oz)   01/21/25 75.8 kg (167 lb)   01/03/25 78.9 kg (174 lb)       Intake/Output Summary (Last 24 hours) at 2/2/2025 1104  Last data filed at 2/2/2025 0700  Gross per 24 hour   Intake 1479.17 ml   Output 2189 ml   Net -709.83 ml     GENERAL APPEARANCE: Frail.   HEENT:  Eyes/ears/nose/neck grossly normal.   RESP: lungs cta b c good efforts, no crackles, rhonchi or wheezes  CV: RRR  ABDOMEN: soft, NT  EXTREMITIES/SKIN: No edema  NEURO: Awake, alert and conversing normally.         Data:     CBC RESULTS:     Recent Labs   Lab 02/02/25  0658 02/01/25  0605 01/31/25  0424 01/30/25  0429 01/29/25  0355 01/28/25  1347   WBC 14.9* 18.2* 14.9* 23.2*  23.2* 14.1* 16.9*   RBC 2.91* 2.97* 2.64* 2.72* 2.74* 3.06*   HGB 9.1* 9.8* 8.1* 8.6* 8.4* 9.5*   HCT 27.3* 28.0* 25.3* 26.2* 26.4* 29.3*    319 256 306 281 331       Basic Metabolic Panel:  Recent Labs   Lab 02/02/25  0916 02/02/25  0658 02/02/25  0205 02/01/25  2153 02/01/25  1815 02/01/25  1736 02/01/25  0902 02/01/25  0605 01/31/25  0427 01/31/25  0424 01/30/25  0431 01/30/25  0429 01/29/25  0357 01/29/25  0355 01/28/25  0429 01/28/25  0425   NA   --  133*  --   --   --   --   --  132*  --  132*  --  132*  --  133*  --  134*   POTASSIUM  --  4.0  --   --   --   --   --  4.4  --  3.8  --  4.0  --  4.4  --  5.2   CHLORIDE  --  96*  --   --   --   --   --  93*  --  96*  --  94*  --  95*  --  98   CO2  --  22  --   --   --   --   --  22  --  22  --  22  --  20*  --  21*   BUN  --  57.8*  --   --   --   --   --  58.5*  --  59.7*  --  60.0*  --  56.7*  --  51.3*   CR  --  6.31*  --   --   --   --   --  6.53*  --  6.88*  --  7.06*  --  7.69*  --  7.65*   * 158* 139* 106* 96 67*   < > 285*   < > 228*   < > 348*   < > 326*   < > 208*   TERENCE  --  9.2  --   --   --   --   --  9.5  --  8.5*  --  9.2  --  9.6  --  9.6    < > = values in this interval not displayed.       INR  Recent Labs   Lab 02/01/25  0605 01/27/25  2230   INR 1.44* 1.77*      Attestation:   I have reviewed today's relevant vital signs, notes, medications, labs and imaging.    Star Acuna MD  [facility] - Nephrology  Office phone :[phone]  Pager: [phone]

## 2025-02-02 NOTE — PLAN OF CARE
[id number]    CMS intact in BLE-- skin luis but warm, pulses +1/+2 w/ Doppler.    Orientations: AxOx3-4, disoriented to time; a little [place]. Intermittently lethargic.  Vitals/Pain: VSS on RA-1LNC. Pain managed with scheduled medications.   Tele: SR / ST w/ multifocal PVC's  Lines/Drains: PIV x2, infusing NS at 50 and Heparin at 1900; next Xa w/ AM labs. L abdominal PD catheter, dressing changed + connected to PD machine overnight.  Skin/Wounds: Some blanchable redness to coccyx. R groin + R popliteal site WDL. R toes/heel wounds w/ dressing and Rooke boot in place.  GI/: NPO except meds. No voids, on PD. No BM.  Labs: , 139.   Ambulation/Assist: Assist x2 with lift; turn and repo q2h.  Sleep Quality: Sleeping between cares. Scheduled melatonin given.    Plan: SLP consult. Pain management. Encourage activity/OOB. Plan for thoracentesis on Monday unless respiratory status declines.

## 2025-02-02 NOTE — PROGRESS NOTES
"   02/02/25 0900   Appointment Info   Signing Clinician's Name / Credentials (SLP) Suzette Coombs M.A. CCC-SLP   General Information   Onset of Illness/Injury or Date of Surgery 01/21/25   Referring Physician Milo Carrion MD   Patient/Family Therapy Goal Statement (SLP) The patient hopes to order cream of wheat for breakfast.   Pertinent History of Current Problem Per MD note: \"Arnulfo Pritchett is a 64 year old male with past medical history of severe heart failure (EF 30%), ESRD (on peritoneal dialysis), chronic paroxysmal AF on warfarin, LLE PAD s/p endarterectomy, RCC s/p nephrectomy admitted on 1/21/2025 for septic shock secondary to right leg PAD with worsening right heel necrotic ulcer. The patient was seen in ED at outside hospital on 1/3/25 and diagnosed with pneumonia, later completing course of Cefdinir. He then presented on 1/21/25 to outside hospital for right leg pain and found to have right leg ulcer and transferred to [facility] for vascular surgery evaluation. Now s/p right common femoral BK popliteal/tibial endarterectomy and right common femoral to below-knee popliteal/tibial PTFE bypass performed on 1/27/25. The patient was admitted to the ICU for requirement of mechanical ventilation and pressor support following surgery for multifactorial shock.  Hospitalist service consulted 1/31/2025 for transfer out of ICU and to assist with medical management.\" SLP consulted for dysphagia, staff had aspiration concerns on 2/1/25.   General Observations Alert and cooperative   Type of Evaluation   Type of Evaluation Swallow Evaluation   Oral Motor   Oral Musculature anomalies present   Mucosal Quality dry   Dentition (Oral Motor)   Comment, Dentition (Oral Motor) Pt reports that he usually does not wear his upper or lower dentures.   Dentition (Oral Motor) edentulous   Facial Symmetry (Oral Motor)   Facial Symmetry (Oral Motor) WNL   Lip Function (Oral Motor)   Lip Range of Motion (Oral Motor) WNL   Tongue " Function (Oral Motor)   Tongue Strength (Oral Motor) strength decreased   Tongue ROM (Oral Motor) protrusion is impaired   Comment, Tongue Function (Oral Motor) mildly reduced strength & coordination, asymmetry observed   Jaw Function (Oral Motor)   Jaw Function (Oral Motor) WNL   Facial Sensation   Facial Sensation WNL   Cough/Swallow/Gag Reflex (Oral Motor)   Soft Palate/Velum (Oral Motor) WNL   Gag Reflex (Oral Motor) not tested   Volitional Throat Clear/Cough (Oral Motor) reduced strength   Volitional Swallow (Oral Motor) effortful   Vocal Quality/Secretion Management (Oral Motor)   Vocal Quality (Oral Motor) WFL   General Swallowing Observations   Past History of Dysphagia No oropharyngeal dysphagia hx documented in EMR. The patient reports that he typically eats softer foods without dentures in place. GERD hx present.   Respiratory Support nasal cannula  (1 li, O2 sats 89-92%)   Current Diet/Method of Nutritional Intake (General Swallowing Observations, NIS) NPO  (pending SLP consult)   Swallowing Evaluation Clinical swallow evaluation   Clinical Swallow Evaluation   Feeding Assistance no assistance needed   Clinical Swallow Evaluation Textures Trialed thin liquids;pureed   Clinical Swallow Eval: Thin Liquid Texture Trial   Mode of Presentation, Thin Liquids spoon;cup;fed by clinician;self-fed   Volume of Liquid or Food Presented 6 oz H20   Oral Phase of Swallow WFL  (anterior spillage on L x1)   Pharyngeal Phase of Swallow intact   Diagnostic Statement no direct signs or symptoms of aspiration   Clinical Swallow Evaluation: Puree Solid Texture Trial   Mode of Presentation, Puree spoon;fed by clinician   Volume of Puree Presented 100% applesauce cup   Oral Phase, Puree WFL   Pharyngeal Phase, Puree intact   Diagnostic Statement no direct signs or symptoms of aspiration   Esophageal Phase of Swallow   Patient reports or presents with symptoms of esophageal dysphagia No   Swallowing Recommendations   Diet  Consistency Recommendations thin liquids (level 0);pureed (level 4)   Supervision Level for Intake close supervision needed   Mode of Delivery Recommendations bolus size, small;slow rate of intake   Swallowing Maneuver Recommendations alternate food and liquid intake   Monitoring/Assistance Required (Eating/Swallowing) stop eating activities when fatigue is present   Recommended Feeding/Eating Techniques (Swallow Eval) maintain upright sitting position for eating;maintain upright posture during/after eating for 30 minutes;minimize distractions during oral intake   Medication Administration Recommendations, Swallowing (SLP) as tolerated\   General Therapy Interventions   Planned Therapy Interventions Dysphagia Treatment   Dysphagia treatment Modified diet education;Instruction of safe swallow strategies;Oropharyngeal exercise training   Clinical Impression   Criteria for Skilled Therapeutic Interventions Met (SLP Eval) Yes, treatment indicated   SLP Diagnosis oropharyngeal dysphagia   Risks & Benefits of therapy have been explained evaluation/treatment results reviewed;care plan/treatment goals reviewed;current/potential barriers reviewed;participants voiced agreement with care plan;participants included;patient   Clinical Impression Comments Clinical dysphagia eval completed per MD order. At this time the patient presents with mild-moderate oropharyngeal dysphagia impacted by generalized weakness, respiratory compromise and edentulous status.     Recommend the patient initiate a modified diet with safety precautions: puree textures (IDDSI 4) with thin liquids (2). Please feed small bites/sips at a slow pace when upright and alert. SLP will f/u for diet tolerance and readiness for advancement.   SLP Total Evaluation Time   Eval: oral/pharyngeal swallow function, clinical swallow Minutes (74118) 20   SLP Goals   Therapy Frequency (SLP Eval) 5 times/week   SLP Predicted Duration/Target Date for Goal Attainment 02/14/25    SLP Goals Swallow   SLP: Safely tolerate diet without signs/symptoms of aspiration Easy to chew diet;Thin liquids;With use of swallow precautions;With assistance/supervision   Interventions   Interventions Quick Adds Swallowing Dysfunction   Swallowing Intervention   Treatment of Swallowing Dysfunction &/or Oral Function for Feeding Minutes (04912) 10   Treatment Detail/Skilled Intervention Educated the pt about swallow eval results, rationale for diet rec & POC.   SLP Discharge Planning   SLP Plan diet tolerance, readiness for solid texture upgrade when respiratory status is improved   SLP Discharge Recommendation home with assist   SLP Rationale for DC Rec below baselline oropharyngeal swallowing ability & diet level   SLP Brief overview of current status  Recommend the patient initiate a modified diet with safety precautions: puree textures (IDDSI 4) with thin liquids (2). Please feed small bites/sips at a slow pace when upright and alert.   SLP Time and Intention   Total Session Time (sum of timed and untimed services) 30   Psychosocial Support   Trust Relationship/Rapport care explained;choices provided;reassurance provided;thoughts/feelings acknowledged   Safety Promotion   Medical Device Protection IV pole/bag removed from visual field;tubing secured

## 2025-02-02 NOTE — PROGRESS NOTES
Vascular Surgery Progress Note  02/02/2025       Subjective:  - NAEON. Reduced O2 requirement and not complaining of shortness of breath, but CT from yesterday shows enlarged R pleural effusion which should be drained. Denies pain in his R leg.     Objective:  Temp:  [97.4  F (36.3  C)-98.4  F (36.9  C)] 97.5  F (36.4  C)  Pulse:  [] 97  Resp:  [8-26] 26  BP: ()/(61-99) 114/84  SpO2:  [88 %-99 %] 94 %    I/O last 3 completed shifts:  In: 1295.5 [I.V.:1295.5]  Out: 2189 [Urine:50; Other:2139]      Gen: Awake, alert, NAD  Resp: Nonlabored breathing on NC  Abd: soft, nondistended, nontender, groin incision c/d/i  Incision: c/d/I  Ext: WWP, no edema, monophasic AT and PT signals     Labs:  Recent Labs   Lab 02/02/25  0658 02/01/25  0605 01/31/25  0424   WBC 14.9* 18.2* 14.9*   HGB 9.1* 9.8* 8.1*    319 256       Recent Labs   Lab 02/02/25  0916 02/02/25  0658 02/02/25  0205 02/01/25  0902 02/01/25  0605 01/31/25  0427 01/31/25  0424   NA  --  133*  --   --  132*  --  132*   POTASSIUM  --  4.0  --   --  4.4  --  3.8   CHLORIDE  --  96*  --   --  93*  --  96*   CO2  --  22  --   --  22  --  22   BUN  --  57.8*  --   --  58.5*  --  59.7*   CR  --  6.31*  --   --  6.53*  --  6.88*   * 158* 139*   < > 285*   < > 228*   TERENCE  --  9.2  --   --  9.5  --  8.5*   MAG  --  2.0  --   --  2.1  --  1.9   PHOS  --  6.0*  --   --  5.7*  --  5.4*    < > = values in this interval not displayed.       Imaging:  CT Chest w/o Contrast   Final Result   IMPRESSION:    1.  A large right pleural effusion has further increased in size and occupies greater than 50% of the right hemithorax. There is near complete collapse of the right lower lobe and partial collapse of the right middle lobe.   2.  A small left pleural effusion is unchanged.   3.  Mild interstitial opacities in both lungs are nonspecific but may be due to pulmonary edema.   4.  Cardiomegaly.   5.  Multiple subacute posterolateral right rib fractures.          XR Chest 2 Views   Final Result   IMPRESSION: Similar to slightly increased moderate to large layering right pleural effusion with presumed adjacent atelectasis. Trace left pleural effusion. Interval removal of the right central venous catheter. No discernible pneumothorax. Similar    cardiomediastinal silhouette. Remote left rib fracture.      Echocardiogram Complete   Final Result      XR Chest Port 1 View   Final Result   IMPRESSION: Similar positioning of the right central venous catheter. Interval extubation.      Overall similar moderate right layering and trace left pleural effusions with adjacent atelectasis. No discernible pneumothorax. Similar cardiomediastinal silhouette.      XR Chest Port 1 View   Final Result   IMPRESSION: ET tube, enteric tube, and right IJ central venous catheter are unchanged in position. The heart is enlarged, similar prior study. There is central pulmonary venous congestion with a moderate right-sided effusion which layers posteriorly.    Perihilar Interstitial edema is also noted      XR Abdomen Port 1 View   Final Result   IMPRESSION: Enteric tube has been advanced, tip in the gastric antrum, satisfactory positioning. Postoperative changes in the abdomen. Residual contrast material in the colon. Atherosclerotic changes. Right pleural effusion with associated passive    atelectasis in the adjacent right base.      XR Chest Port 1 View   Final Result   IMPRESSION: Interval placement of endotracheal tube, tip 4.8 cm above the suki. Right IJ catheter has been placed, tip in the SVC. Enteric tube has been placed, tip and side-port below the diaphragm. Moderately large right pleural effusion with    associated passive atelectasis in the adjacent right lung, unchanged. Calcified granuloma left apex. Minor strands of linear atelectasis left base. No effusion on the left. Normal cardiac size. Coronary artery stent. Mild venous congestion.    Atherosclerotic thoracic aorta.  Degenerative changes thoracic spine. Previously healed bilateral rib fracture deformities. Monitoring leads overlying the chest.      XR Abdomen Port 1 View   Final Result   IMPRESSION: Orogastric tube tip in the stomach. The sidehole is just below the GE junction.      XR Surgery MOODY L/T 5 Min Fluoro w Stills   Final Result      XR Chest Port 1 View   Final Result   IMPRESSION: Moderate-large right pleural effusion, increased compared to prior. Clear left lung and pleural space. Stable enlarged cardiomediastinal silhouette. No pneumothorax.      MR Ankle Right w/o Contrast   Final Result   IMPRESSION:   1.  There is a soft tissue ulceration over the posterior aspect of the hindfoot with some surrounding edema or cellulitis but no evidence for abscess.   2.  No evidence for osteomyelitis.   3.  Small tibiotalar joint effusion.   4.  No evidence for fracture.   5.  Acute on chronic plantar fasciitis. Plantar calcaneal spurring.   6.  No additional tendinous or ligamentous pathology.            US Thoracentesis   Final Result   IMPRESSION:   Status post right ultrasound-guided thoracentesis.         IR Lower Extremity Angiogram Right   Final Result      CTA Abdomen Pelvis Runoff w Contrast   Final Result   IMPRESSION:   1.  Occluded distal right superficial femoral artery and an popliteal artery as above including stents in the SFA and popliteal arteries. The tibial arteries are difficult to evaluate due to poor flow and calcified plaque. The distal anterior tibial and    peroneal arteries are patent.   2.  Patent left common femoral artery and common femoral artery to tibioperoneal trunk bypass graft without evidence for significant stenosis.   3.  Large right pleural effusion and small left pleural effusion.   4.  Ascites most likely relates to a peritoneal dialysis catheter.   5.  Colonic diverticulosis.      XR Calcaneus Right G/E 2 Views   Final Result   IMPRESSION: Soft tissue ulcer plantar to the calcaneus.  There is diffuse demineralization without discrete erosion. MRI would be more sensitive for early findings of osteomyelitis. Plantar calcaneal spur. Achilles enthesophyte. Vascular calcifications.      US Lower Extremity Venous Mapping Right   Final Result   IMPRESSION:   1.  Right great saphenous vein measurements as described, relatively small diameters. Areas of echogenic walls, may relate to chronic superficial thrombophlebitis.      US Upper Extremity Venous Mapping Bilateral   Final Result   IMPRESSION: Bilateral upper extremity vein measurements as described. Both cephalic veins are relatively small in caliber.         US Lower Extremity Arterial Duplex Right   Final Result   IMPRESSION:   1.  No blood flow demonstrated in the distal superficial femoral, popliteal, and runoff arteries. There may be trickle blood flow in the distal runoff arteries, though difficult to distinguish from artifact.      2.  Uncertain if stent is in the right lower extremity. Previous angiogram images are not available for review.         US Thoracentesis    (Results Pending)   US Thoracentesis    (Results Pending)        Assessment/Plan:   64 year old male who is POD6 s/p R fem-BK pop bypass with PTFE and proximal short GSV interposition graft. Extubated POD2, off pressor. Out of ICU in [facility]. Worsening R pleural effusion of unclear etiology despite thoracentesis done on 1/24/2025.    - Recommend ultrasound guided thoracentesis today with IR for enlarging R pleural effusion  - ID consulted for assistance with significant persistent leukocytosis (likely 2/2 pneumonia) despite antibiotics  - SLP consulted for swallow eval, recommending Easy to chew diet;Thin liquids;With use of swallow precautions;With assistance/supervision. Patient to be NPO until thoracentesis performed  - Plavix/heparin gtt  - Appreciate input from hospitalist and podiatry teams. No current plans for debridement of heel wound given dry gangrene and no  osteomyelitis. If this were to change, would reengage Podiatry team     Seen, examined, and discussed with staff.  - - - - - - - - - - - - - - - - - -  Milo Carrion MD  Vascular Surgery Resident

## 2025-02-03 ENCOUNTER — APPOINTMENT (OUTPATIENT)
Dept: ULTRASOUND IMAGING | Facility: CLINIC | Age: 65
DRG: 853 | End: 2025-02-03
Attending: STUDENT IN AN ORGANIZED HEALTH CARE EDUCATION/TRAINING PROGRAM
Payer: MEDICARE

## 2025-02-03 ENCOUNTER — APPOINTMENT (OUTPATIENT)
Dept: GENERAL RADIOLOGY | Facility: CLINIC | Age: 65
DRG: 853 | End: 2025-02-03
Attending: INTERNAL MEDICINE
Payer: MEDICARE

## 2025-02-03 ENCOUNTER — APPOINTMENT (OUTPATIENT)
Dept: SPEECH THERAPY | Facility: CLINIC | Age: 65
DRG: 853 | End: 2025-02-03
Attending: INTERNAL MEDICINE
Payer: MEDICARE

## 2025-02-03 ENCOUNTER — APPOINTMENT (OUTPATIENT)
Dept: OCCUPATIONAL THERAPY | Facility: CLINIC | Age: 65
DRG: 853 | End: 2025-02-03
Attending: INTERNAL MEDICINE
Payer: MEDICARE

## 2025-02-03 LAB
% LINING CELLS, BODY FLUID: 16 %
ANION GAP SERPL CALCULATED.3IONS-SCNC: 14 MMOL/L (ref 7–15)
APPEARANCE FLD: ABNORMAL
ATRIAL RATE - MUSE: 90 BPM
BUN SERPL-MCNC: 61.8 MG/DL (ref 8–23)
CALCIUM SERPL-MCNC: 9.6 MG/DL (ref 8.8–10.4)
CHLORIDE SERPL-SCNC: 94 MMOL/L (ref 98–107)
COLOR FLD: ABNORMAL
CREAT SERPL-MCNC: 6.32 MG/DL (ref 0.67–1.17)
DIASTOLIC BLOOD PRESSURE - MUSE: NORMAL MMHG
EGFRCR SERPLBLD CKD-EPI 2021: 9 ML/MIN/1.73M2
ERYTHROCYTE [DISTWIDTH] IN BLOOD BY AUTOMATED COUNT: 18.7 % (ref 10–15)
GLUCOSE BLDC GLUCOMTR-MCNC: 125 MG/DL (ref 70–99)
GLUCOSE BLDC GLUCOMTR-MCNC: 132 MG/DL (ref 70–99)
GLUCOSE BLDC GLUCOMTR-MCNC: 167 MG/DL (ref 70–99)
GLUCOSE BLDC GLUCOMTR-MCNC: 185 MG/DL (ref 70–99)
GLUCOSE BLDC GLUCOMTR-MCNC: 200 MG/DL (ref 70–99)
GLUCOSE SERPL-MCNC: 207 MG/DL (ref 70–99)
HCO3 SERPL-SCNC: 22 MMOL/L (ref 22–29)
HCT VFR BLD AUTO: 28.4 % (ref 40–53)
HGB BLD-MCNC: 10.1 G/DL (ref 13.3–17.7)
HGB BLD-MCNC: 9.3 G/DL (ref 13.3–17.7)
INTERPRETATION ECG - MUSE: NORMAL
LACTATE SERPL-SCNC: 1.2 MMOL/L (ref 0.7–2)
LYMPHOCYTES NFR FLD MANUAL: 5 %
MAGNESIUM SERPL-MCNC: 1.9 MG/DL (ref 1.7–2.3)
MCH RBC QN AUTO: 30.8 PG (ref 26.5–33)
MCHC RBC AUTO-ENTMCNC: 32.7 G/DL (ref 31.5–36.5)
MCV RBC AUTO: 94 FL (ref 78–100)
MONOS+MACROS NFR FLD MANUAL: 65 %
NEUTS BAND NFR FLD MANUAL: 14 %
P AXIS - MUSE: 29 DEGREES
PATH REV: NORMAL
PHOSPHATE SERPL-MCNC: 5.6 MG/DL (ref 2.5–4.5)
PLATELET # BLD AUTO: 376 10E3/UL (ref 150–450)
POTASSIUM SERPL-SCNC: 4.3 MMOL/L (ref 3.4–5.3)
PR INTERVAL - MUSE: 188 MS
QRS DURATION - MUSE: 106 MS
QT - MUSE: 366 MS
QTC - MUSE: 447 MS
R AXIS - MUSE: 257 DEGREES
RBC # BLD AUTO: 3.02 10E6/UL (ref 4.4–5.9)
SODIUM SERPL-SCNC: 130 MMOL/L (ref 135–145)
SPECIMEN SOURCE FLD: ABNORMAL
SYSTOLIC BLOOD PRESSURE - MUSE: NORMAL MMHG
T AXIS - MUSE: 82 DEGREES
UFH PPP CHRO-ACNC: 0.38 IU/ML
VENTRICULAR RATE- MUSE: 90 BPM
WBC # BLD AUTO: 15.4 10E3/UL (ref 4–11)
WBC # FLD AUTO: 1078 /UL

## 2025-02-03 PROCEDURE — 93926 LOWER EXTREMITY STUDY: CPT

## 2025-02-03 PROCEDURE — 90999 UNLISTED DIALYSIS PROCEDURE: CPT

## 2025-02-03 PROCEDURE — 80048 BASIC METABOLIC PNL TOTAL CA: CPT | Performed by: INTERNAL MEDICINE

## 2025-02-03 PROCEDURE — 84100 ASSAY OF PHOSPHORUS: CPT | Performed by: INTERNAL MEDICINE

## 2025-02-03 PROCEDURE — 71045 X-RAY EXAM CHEST 1 VIEW: CPT

## 2025-02-03 PROCEDURE — 250N000013 HC RX MED GY IP 250 OP 250 PS 637: Performed by: INTERNAL MEDICINE

## 2025-02-03 PROCEDURE — 99232 SBSQ HOSP IP/OBS MODERATE 35: CPT | Performed by: INTERNAL MEDICINE

## 2025-02-03 PROCEDURE — 99232 SBSQ HOSP IP/OBS MODERATE 35: CPT | Performed by: HOSPITALIST

## 2025-02-03 PROCEDURE — 120N000013 HC R&B IMCU

## 2025-02-03 PROCEDURE — 250N000011 HC RX IP 250 OP 636: Performed by: INTERNAL MEDICINE

## 2025-02-03 PROCEDURE — 85014 HEMATOCRIT: CPT | Performed by: INTERNAL MEDICINE

## 2025-02-03 PROCEDURE — 36415 COLL VENOUS BLD VENIPUNCTURE: CPT | Performed by: INTERNAL MEDICINE

## 2025-02-03 PROCEDURE — 83735 ASSAY OF MAGNESIUM: CPT | Performed by: INTERNAL MEDICINE

## 2025-02-03 PROCEDURE — 36415 COLL VENOUS BLD VENIPUNCTURE: CPT

## 2025-02-03 PROCEDURE — 36415 COLL VENOUS BLD VENIPUNCTURE: CPT | Performed by: HOSPITALIST

## 2025-02-03 PROCEDURE — 250N000009 HC RX 250: Performed by: INTERNAL MEDICINE

## 2025-02-03 PROCEDURE — 250N000013 HC RX MED GY IP 250 OP 250 PS 637: Performed by: HOSPITALIST

## 2025-02-03 PROCEDURE — 99024 POSTOP FOLLOW-UP VISIT: CPT

## 2025-02-03 PROCEDURE — 97530 THERAPEUTIC ACTIVITIES: CPT | Mod: GO

## 2025-02-03 PROCEDURE — 85018 HEMOGLOBIN: CPT

## 2025-02-03 PROCEDURE — 83605 ASSAY OF LACTIC ACID: CPT | Performed by: HOSPITALIST

## 2025-02-03 PROCEDURE — 92526 ORAL FUNCTION THERAPY: CPT | Mod: GN | Performed by: SPEECH-LANGUAGE PATHOLOGIST

## 2025-02-03 PROCEDURE — 85520 HEPARIN ASSAY: CPT | Performed by: HOSPITALIST

## 2025-02-03 RX ORDER — GENTAMICIN SULFATE 1 MG/G
CREAM TOPICAL DAILY PRN
Status: DISCONTINUED | OUTPATIENT
Start: 2025-02-03 | End: 2025-02-03

## 2025-02-03 RX ADMIN — MIDODRINE HYDROCHLORIDE 10 MG: 2.5 TABLET ORAL at 17:59

## 2025-02-03 RX ADMIN — ACETAMINOPHEN 975 MG: 325 TABLET, FILM COATED ORAL at 12:36

## 2025-02-03 RX ADMIN — CLOPIDOGREL BISULFATE 75 MG: 75 TABLET ORAL at 21:15

## 2025-02-03 RX ADMIN — SEVELAMER CARBONATE 800 MG: 800 TABLET, FILM COATED ORAL at 12:36

## 2025-02-03 RX ADMIN — SENNOSIDES AND DOCUSATE SODIUM 1 TABLET: 50; 8.6 TABLET ORAL at 10:03

## 2025-02-03 RX ADMIN — MIDODRINE HYDROCHLORIDE 10 MG: 2.5 TABLET ORAL at 10:03

## 2025-02-03 RX ADMIN — HEPARIN SODIUM 1900 UNITS/HR: 10000 INJECTION, SOLUTION INTRAVENOUS at 23:49

## 2025-02-03 RX ADMIN — Medication 12.5 MG: at 10:03

## 2025-02-03 RX ADMIN — PANTOPRAZOLE SODIUM 40 MG: 40 INJECTION, POWDER, FOR SOLUTION INTRAVENOUS at 07:57

## 2025-02-03 RX ADMIN — Medication 1 CAPSULE: at 10:02

## 2025-02-03 RX ADMIN — CINACALCET 60 MG: 30 TABLET ORAL at 21:16

## 2025-02-03 RX ADMIN — GENTAMICIN SULFATE: 1 CREAM TOPICAL at 21:14

## 2025-02-03 RX ADMIN — ACETAMINOPHEN 975 MG: 325 TABLET, FILM COATED ORAL at 21:16

## 2025-02-03 RX ADMIN — SENNOSIDES AND DOCUSATE SODIUM 1 TABLET: 50; 8.6 TABLET ORAL at 21:16

## 2025-02-03 RX ADMIN — POLYETHYLENE GLYCOL 3350 17 G: 17 POWDER, FOR SOLUTION ORAL at 10:03

## 2025-02-03 RX ADMIN — OXYCODONE HYDROCHLORIDE 2.5 MG: 5 TABLET ORAL at 05:03

## 2025-02-03 RX ADMIN — Medication 5 MG: at 21:16

## 2025-02-03 RX ADMIN — OXYCODONE HYDROCHLORIDE 2.5 MG: 5 TABLET ORAL at 10:03

## 2025-02-03 RX ADMIN — SEVELAMER CARBONATE 800 MG: 800 TABLET, FILM COATED ORAL at 10:03

## 2025-02-03 RX ADMIN — HEPARIN SODIUM 1900 UNITS/HR: 10000 INJECTION, SOLUTION INTRAVENOUS at 12:36

## 2025-02-03 RX ADMIN — CALCITRIOL CAPSULES 0.25 MCG 0.25 MCG: 0.25 CAPSULE ORAL at 21:15

## 2025-02-03 RX ADMIN — ACETAMINOPHEN 975 MG: 325 TABLET, FILM COATED ORAL at 04:56

## 2025-02-03 RX ADMIN — SEVELAMER CARBONATE 800 MG: 800 TABLET, FILM COATED ORAL at 17:59

## 2025-02-03 RX ADMIN — ATORVASTATIN CALCIUM 40 MG: 40 TABLET, FILM COATED ORAL at 21:15

## 2025-02-03 ASSESSMENT — ACTIVITIES OF DAILY LIVING (ADL)
ADLS_ACUITY_SCORE: 68
ADLS_ACUITY_SCORE: 64
ADLS_ACUITY_SCORE: 64
ADLS_ACUITY_SCORE: 68
ADLS_ACUITY_SCORE: 68
ADLS_ACUITY_SCORE: 64
ADLS_ACUITY_SCORE: 65
ADLS_ACUITY_SCORE: 64
ADLS_ACUITY_SCORE: 68
ADLS_ACUITY_SCORE: 65
ADLS_ACUITY_SCORE: 68
ADLS_ACUITY_SCORE: 64
ADLS_ACUITY_SCORE: 64
ADLS_ACUITY_SCORE: 68
ADLS_ACUITY_SCORE: 68

## 2025-02-03 NOTE — PROGRESS NOTES
VASCULAR SURGERY PROGRESS NOTE    Subjective:  Resting comfortably in bed, no acute events overnight. Required thoracentesis yesterday-reports breathing has improved since. Denies pain.     Objective:  Intake/Output Summary (Last 24 hours) at 2/3/2025 0817  Last data filed at 2/3/2025 0030  Gross per 24 hour   Intake 1052.5 ml   Output 0 ml   Net 1052.5 ml     PHYSICAL EXAM:  /80   Pulse 98   Temp 98.3  F (36.8  C) (Axillary)   Resp 20   Wt 76.4 kg (168 lb 6.9 oz)   SpO2 96%   BMI 22.22 kg/m    Alert and oriented x4, neurologically intact  Nonlabored breathing, on RA  Abdomen remains soft, nondistended, nontender, groin incision intact, incisions intact  R lower extremity has monophasic(strong) AT signal with slightly weaker PT     ASSESSMENT:  64 year old male who is POD7 s/p R fem-BK pop bypass with PTFE and proximal short GSV interposition graft. Extubated POD2, off pressor. Out of ICU in [place]. Worsening R pleural effusion of unclear etiology despite thoracentesis done on 1/24/2025.    WBC increased to 15.4    PLAN:  -currently off antibiotics, continue to monitor, ID involved   -NGTD from cultures on 2/2  -oob, physical therapy  -plavix, heparin gtt  -speech involved due to ongoing swallowing issues  -appreciate all teams involved in the patient's care  -podiatry s/o as of now, as no current plans for debridement of heel wound given dry gangrene and nor osteomyelitis. Question now of worsening ischemic changes to the second and third right toes. Will curbside podiatry today.     Discussed pt history, exam, assessment and plan with vascular surgery staff.     Cecilia Stearns, NP  VASCULAR SURGERY

## 2025-02-03 NOTE — PLAN OF CARE
"Patient Name: Sherman  MRN: [id number]  Date of Admission: 1/21/2025  Reason for Admission: R limb ischemia   Level of Care: [facility] [place]    Vitals:   BP Readings from Last 1 Encounters:   02/03/25 110/84     Pulse Readings from Last 1 Encounters:   02/03/25 106     Wt Readings from Last 1 Encounters:   02/03/25 76.4 kg (168 lb 6.9 oz)     Ht Readings from Last 1 Encounters:   01/21/25 1.854 m (6' 1\")     Estimated body mass index is 22.22 kg/m  as calculated from the following:    Height as of an earlier encounter on 1/21/25: 1.854 m (6' 1\").    Weight as of this encounter: 76.4 kg (168 lb 6.9 oz).  Temp Readings from Last 1 Encounters:   02/03/25 97.3  F (36.3  C) (Oral)       Pain: Pain goal 1-2 Pain Rating 6 Effective pain medication/regimen scheduled tylenol and prn oxy     CV Surgery Patient: No    Assessment    Resp: LS diminished. MORTENSEN. Abdominal muscle use noted. On room air.   Telemetry: ST  Neuro: A&Ox4  GI/: Incontinent B/B. Minced and moist diet. Abd rounded.   Skin/Wounds: RLE wounds, bilateral groin site, scattered bruising. Kurt RLE.   Lines/Drains: R and L PIV. Heparin infusing @ 1900. PD catheter to L abdomen.   Activity: Lift, T/R Q2H  Sleep: Between cares   Abnormal Labs: Cr 6.32, WBC 15.4.     Aggression Stop Light: Green          Patient Care Plan: Continue heparin gtt, vascular following. PD overnight. Working w/ PT/OT/SLP                          "

## 2025-02-03 NOTE — PROGRESS NOTES
[facility]    Medicine Progress Note - Hospitalist Service    Date of Admission:  1/21/2025    Assessment & Plan   Arnulfo Pritchett is a 64 year old male with past medical history of severe heart failure (EF 30%), ESRD (on peritoneal dialysis), chronic paroxysmal AF on warfarin, LLE PAD s/p endarterectomy, RCC s/p nephrectomy admitted on 1/21/2025 for septic shock secondary to right leg PAD with worsening right heel necrotic ulcer. The patient was seen in ED at outside hospital on 1/3/25 and diagnosed with pneumonia, later completing course of Cefdinir. He then presented on 1/21/25 to outside hospital for right leg pain and found to have right leg ulcer and transferred to [facility] for vascular surgery evaluation. Now s/p right common femoral BK popliteal/tibial endarterectomy and right common femoral to below-knee popliteal/tibial PTFE bypass performed on 1/27/25. The patient was admitted to the ICU for requirement of mechanical ventilation and pressor support following surgery for multifactorial shock.      Hospitalist service consulted 1/31/2025 for transfer out of ICU and to assist with medical management along with vascular surgery. Infectious disease also consulted.     Hx PAD of LLE s/p endarterectomy and fem-tibioperoneal trunk bypass on 10/25/24  Hx RLE arterial occlusion s/p SFA angioplasty and stent 5/2024  PAD RLE, right heel gangrene, occluded SFA, no evidence of osteomyelitis  s/p right common femoral and popliteal/tibial endarterectomy, right femoral to popliteal/tibial PTFE bypass 1/27/25   -Found to have critical limb ischemia on admission on January 21, for details see admission note.   -Distributive shock following surgery followed by the ICU service until 1/31.  -Followed by vascular surgery, podiatry, wound care, and ID.  -Coumadin has been held since admission, recently on IV heparin, vascular surgery to review when to restart warfarin.**  -Has been maintained on statin  and Plavix 75 mg daily during this hospital stay.  -Recent IV piperacillin/tazobactam (Zosyn), discontinued 2/2 per ID; follow-up cultures, monitor off antibiotics.  -Any future plans for surgical debridement as per vascular surgery and podiatry.  -Wound care per surgery and WOC.  -Pain control, see hospital orders.  -Possible worsening ischemic changes in 2nd-5th toes of right foot, vascular surgery planning to discuss with podiatry today.  -AM labs.     Multifactorial shock, resolved  ICU admission, weaned off vasopressors 1/30.  -Transitioned to midodrine with increase in prior to admission dose on1/31.  -Monitor blood pressure and heart rate.  -Antibiotics as above.     End-stage renal disease (ESRD), on peritoneal dialysis  Hx of secondary hyperparathyroidism and hyperphosphatemia, phosphorous elevated above baseline of 6 at most recent of 7.9   Nephrology consulted, ongoing follow-up.  Did not require hemodialysis this hospital stay.  -Ongoing peritoneal dialysis, as per nephrology.  -Continue midodrine, nephrology to review, monitor blood pressure.     Acute hypoxemic respiratory failure  Right pleural effusion  Status post thoracentesis 1/24/25, repeat 2/2/25  -Following surgery on admission by vascular surgery was intubated.  Recent pneumonia treated with 3 weeks of Ceftin prior to this admission.  No concern for pneumonia per intensivist service.  -Extubated 1/29/25.  -Right pleural effusion.  Status post thoracentesis1/24/25 with 1.5 L clear yellow fluid removed, likely transudative based on low cell count of 117, , and protein of 1.7. Possibly secondary to reduced peritoneal dialysis during hospitalization vs underlying severe HFrEF (30%).  -CT chest 2/1/25, see report, no clear pneumonia; large right pleural effusion noted.  -Wean down oxygen as able, encourage incentive spirometry.  -Antibiotics as above, discontinued by ID 2/2.  -ID consulted 2/1 as above, ongoing follow-up.  -S/p repeat Right  thoracentesis on 2/2 with 2.3 L of rolan fluid removed; additional studies, cultures, cytology PENDING - needs review.**    Paroxysmal atrial fibrillation  Chronic anticoagulation prior to admission, warfarin - ON HOLD  Ischemic cardiomyopathy with HLD, CAD s/p multiple stents, and severe EFrEF (30%)   Wide complex tachycardia 2/1/25  Assessment-postoperatively has been maintained on heparin drip.  Coumadin has been held during his hospital stay.  -Echo 1/31-EF 25 to 30%.  Severe global hypokinesis with abnormal septal motion consistent with conduction abnormality.  Severe inferior wall hypokinesis.  LVH.  RV mildly dilated and mildly reduced RV function.  Mild mitral regurgitation.  Mild to moderate mitral stenosis.  Moderate aortic stenosis.  Left ventricular fairly pressures elevated.  -Episode of wide-complex tachycardia noted by nursing staff, 15 beats, up on 2/1/2025.  -Continue inpatient telemetry.  -Consider follow-up cardiology consultation if recurrent wide-complex tachycardia in the setting of left ventricular dysfunction.  -Restarted prior to admission beta-blocker, Toprol XL, at adjusted dose, monitoring heart rate and blood pressure.  -Continue IV heparin anticoagulation, with future transition to oral Coumadin as prior to admission when appropriate from vascular surgery standpoint.     Moderate protein calorie nutrition  -Nutrition consulted.  -Speech and language therapy (SLP) consulted, diet per speech therapy.     Hyperglycemia  Type 2 diabetes   hemoglobin A1c 5.7% October 24  On insulin prior to admission.  -Monitor blood sugars.  -Continue insulin, adjust as needed.  -Blood sugars fairly well controlled.  -Monitor for hypoglycemia.    Mixed anion gap acidosis  -Improved respiratory function, monitor.  Anion gap resolved 1/31.     Abdominal discomfort 1/30, resolved  Noted to have some right upper quadrant pain 1/30.  Now resolved.  LFTs normal.  Has been on Zosyn for above surgical issues.    -Monitor abdominal exam.  -Antibiotics as above.  -Surgery to review and monitor as well.     Anemia of chronic disease  Baseline around 10.  Has been stable in the 8 range.  No signs of bleeding.  -Hemoglobin stable around 9, recheck in AM.    Gout  -PTA allopurinol currently on hold, reassess daily.     History of renal cell carcinoma   -Status post right nephrectomy, monitor.     History of obstructive sleep apnea.  By report, intolerant of CPAP   -Monitor, follow-up with outpatient provider.    Weakness and deconditioning  -PT, OT, speech and language therapy (SLP) consulted.  -Increase activity as tolerated.    Disposition  Anticipated discharge timing see Disposition Plan below and Medically Ready for Discharge link.  -Anticipated discharge to transitional care unit.  -Social work consulted for discharge planning.        Diet: Snacks/Supplements Adult: Expedite Cup; Between Meals  Snacks/Supplements Adult: Nepro Oral Supplement; Between Meals  Combination Diet Pureed Diet (level 4); Thin Liquids (level 0)    DVT Prophylaxis: IV heparin  Rosario Catheter: Not present  Lines: None     Cardiac Monitoring: ACTIVE order. Indication: Tachyarrhythmias, acute (48 hours)  Code Status: Full Code      Clinically Significant Risk Factors         # Hyponatremia: Lowest Na = 130 mmol/L in last 2 days, will monitor as appropriate  # Hypochloremia: Lowest Cl = 94 mmol/L in last 2 days, will monitor as appropriate      # Hypoalbuminemia: Lowest albumin = 1.8 g/dL at 1/30/2025  3:17 PM, will monitor as appropriate  # Coagulation Defect: INR = 1.44 (Ref range: 0.85 - 1.15) and/or PTT = 42 Seconds (Ref range: 22 - 38 Seconds), will monitor for bleeding    # Hypertension: Noted on problem list  # Chronic heart failure with reduced ejection fraction: last echo with EF <40%          # DMII: A1C = 6.6 % (Ref range: <5.7 %) within past 6 months    # Severe Malnutrition: based on nutrition assessment    # Financial/Environmental  Concerns:           Social Drivers of Health    Tobacco Use: Medium Risk (1/27/2025)    Patient History     Smoking Tobacco Use: Former     Smokeless Tobacco Use: Never   Alcohol Use: Unknown (11/28/2018)    Received from [facility]    AUDIT-C     Frequency of Alcohol Consumption: Monthly or less     Frequency of Binge Drinking: Never   Transportation Needs: High Risk (1/21/2025)    Transportation Needs     Within the past 12 months, has lack of transportation kept you from medical appointments, getting your medicines, non-medical meetings or appointments, work, or from getting things that you need?: Yes   Physical Activity: Unknown (10/8/2024)    Exercise Vital Sign     Days of Exercise per Week: 0 days   Social Connections: Unknown (10/8/2024)    Social Connection and Isolation Panel [NHANES]     Frequency of Social Gatherings with Friends and Family: Patient declined          Disposition Plan     Medically Ready for Discharge: Anticipated in 2-4 Days             Jimbo Biggs MD  Hospitalist Service  [facility]  Securely message with [employer] (more info)  Text page via [employer] Paging/Directory   ______________________________________________________________________    Interval History   Arnulfo Pritchett was seen this morning.  Feels a little better today.  Shortness of breath improved following thoracentesis yesterday.  Has some discomfort in his right foot, now with questionable worsening ischemic changes in second and third toes.  Denies fevers, chest pain, nausea.  Plan of care discussed with bedside nurse.    Physical Exam   Vital Signs: Temp: 97.4  F (36.3  C) Temp src: Oral BP: 126/80 Pulse: 98   Resp: 20 SpO2: 96 % O2 Device: None (Room air)    Weight: 168 lbs 6.9 oz    Constitutional: awake, alert, cooperative, no apparent distress, laying in the hospital bed  Respiratory: no increased work of  breathing, diminished at the right base  Cardiovascular: regular rate and rhythm, normal S1 and S2, no murmur noted  GI: normal bowel sounds, soft, non-distended, non-tender  Skin: warm, dry, dressing in place on right lower extremity, ischemic changes of 2nd-5th toes on right foot  Musculoskeletal: no lower extremity pitting edema present  Neurologic: awake, alert, answers questions appropriately, moves all extremities    Medical Decision Making       45 MINUTES SPENT BY ME on the date of service doing chart review, history, exam, documentation & further activities per the note.      Data     I have personally reviewed the following data over the past 24 hrs:    15.4 (H)  \   9.3 (L)   / 376     130 (L) 94 (L) 61.8 (H) /  200 (H)   4.3 22 6.32 (H) \     ALT: N/A AST: N/A AP: N/A TBILI: N/A   ALB: N/A TOT PROTEIN: N/A LIPASE: N/A     Procal: N/A CRP: N/A Lactic Acid: 1.2       Ferritin:  N/A % Retic:  N/A LDH:  N/A       Imaging results reviewed over the past 24 hrs:   Recent Results (from the past 24 hours)   XR Chest Port 1 View    Narrative    EXAM: XR CHEST PORT 1 VIEW  LOCATION: [facility]  DATE: 2/3/2025    INDICATION: F u after thoracentesis  COMPARISON: 2/1/2015.      Impression    IMPRESSION: The heart is enlarged. There is improving aeration of the right mid and lower lung zone with a persistent but much improved right-sided pleural effusion. Discoid atelectasis is seen in the right lung base.

## 2025-02-03 NOTE — PLAN OF CARE
"Patient Name: Sherman  MRN: [id number]  Date of Admission: 1/21/2025  Reason for Admission: Cellulitis  [place] [facility] [place]    Vitals:   BP Readings from Last 1 Encounters:  02/02/25 : 114/85    Pulse Readings from Last 1 Encounters:  02/02/25 : 93    Wt Readings from Last 1 Encounters:  02/02/25 : 82.8 kg (182 lb 8.7 oz)    Ht Readings from Last 1 Encounters:  01/21/25 : 1.854 m (6' 1\")    Estimated body mass index is 24.08 kg/m  as calculated from the following:    Height as of an earlier encounter on 1/21/25: 1.854 m (6' 1\").    Weight as of this encounter: 82.8 kg (182 lb 8.7 oz).  Temp Readings from Last 1 Encounters:  02/02/25 : 97.8  F (36.6  C) (Axillary)      Pain: denies except when movement then 7/10, taking tylenol     CV Surgery Patient: No    Assessment    Resp: slightly better SOB after thoracentesis, RA   Telemetry: SR with PVCs  Neuro: intact, BLE about \"50-60%\" numbness   GI/: continent, anuria due to PD, no BM this shift- 1x attempt on bedpan- given miralax and senna   Skin/Wounds: R thoa site bandaid, LLQ dialysis catheter , wound ear abrasion, Bilateral groin sites- L old, R new, R surgical sites with revascularization, R middle toes gangrene, R heel PI    Lines/Drains: PIV infusing heparin   Activity: A2.lift   Abnormal Labs: hepxa following     Aggression Stop Light: Green          Patient Care Plan: Thoracentesis done today through US  "

## 2025-02-03 NOTE — PROGRESS NOTES
Renal Medicine Progress Note            Assessment/Plan:     Arnulfo Pritchett is a 64 year old male CAD, HTN, HLD, pafib, HFrEF (EF 20-25%), ESRD on PD, DM2, TALI, RCC s/p R nephrectomy (2022), PAD with multiple LE procedures      # End stage renal disease on PD  Chronic PD for last 3.5 years.  Home unit [facility], nephrologist Dr. May  Rx: 5 cycles, 2 L fill volumes, 9 hours, last fill 1.2 L with external.     # Moderate R pleural effusion: Status post thoracentesis on 1/24 and 2/2.  Transudative     # PAD, right heel gangrene, occluded right SFA.  Status post rt fem - below knee popliteal bypass this admission                              Other issues-  Diabetes mellitus  Chronic paroxysmal atrial fibrillation  Secondary hyperparathyroidism and hyperphosphatemia  Severe heart failure with EF of 30% and mild RV dysfunction.      Plan:  #  PD order placed.  2.5%, 2 L fill x 5 over 9 hours          Interval History:     Following for PD management.  No issues with PD overnight.  Afebrile.  Blood pressure okay.  Paracentesis yesterday with 2.3 L fluid removed from right lung.         Medications and Allergies:     Current Facility-Administered Medications   Medication Dose Route Frequency Provider Last Rate Last Admin    - MEDICATION INSTRUCTIONS for Dialysis Patients -   Does not apply See Admin Instructions Walter Dempsey MD        acetaminophen (TYLENOL) tablet 975 mg  975 mg Oral or Feeding Tube Q8H Walter Dempsey MD   975 mg at 02/03/25 0456    Or    acetaminophen (TYLENOL) Suppository 650 mg  650 mg Rectal Q8H Walter Dempsey MD        [Held by provider] allopurinol (ZYLOPRIM) tablet 200 mg  200 mg Oral QPM Walter Dempsey MD   200 mg at 01/26/25 1957    atorvastatin (LIPITOR) tablet 40 mg  40 mg Oral or Feeding Tube At Bedtime Walter Dempsey MD   40 mg at 02/02/25 2030    calcitRIOL (ROCALTROL) capsule 0.25 mcg  0.25 mcg Oral At Bedtime Walter Dempsey MD   0.25 mcg at 02/02/25  2030    cinacalcet (SENSIPAR) tablet 60 mg  60 mg Oral Q Mon Wed Fri AM Walter Dempsey MD   60 mg at 01/31/25 2019    clopidogrel (PLAVIX) tablet 75 mg  75 mg Oral or Feeding Tube QPM Walter Dempsey MD   75 mg at 02/02/25 2030    insulin aspart (NovoLOG) injection (RAPID ACTING)   Subcutaneous TID w/meals Walter Dempsey MD   3 Units at 02/02/25 1455    insulin aspart (NovoLOG) injection (RAPID ACTING)  1-7 Units Subcutaneous TID  Walter Dempsey MD   1 Units at 02/03/25 0820    insulin aspart (NovoLOG) injection (RAPID ACTING)  1-5 Units Subcutaneous At Bedtime Walter Dempsey MD        insulin glargine (LANTUS PEN) injection 15 Units  15 Units Subcutaneous Daily Walter Dempsey MD   15 Units at 02/03/25 1007    melatonin tablet 5 mg  5 mg Oral At Bedtime Walter Dempsey MD   5 mg at 02/02/25 2030    metoprolol succinate ER (TOPROL-XL) 24 hr half-tab 12.5 mg  12.5 mg Oral Daily Larry Askew MD   12.5 mg at 02/03/25 1003    midodrine (PROAMATINE) tablet 10 mg  10 mg Oral BID w/meals Walter Dempsey MD   10 mg at 02/03/25 1003    multivitamin RENAL (TRIPHROCAPS) capsule 1 capsule  1 capsule Oral Daily Walter Dempsey MD   1 capsule at 02/03/25 1002    pantoprazole (PROTONIX) 2 mg/mL suspension 40 mg  40 mg Per Feeding Tube QAM  Walter Dempsey MD   40 mg at 01/28/25 0834    Or    pantoprazole (PROTONIX) IV push injection 40 mg  40 mg Intravenous QAM  Walter Dempsey MD   40 mg at 02/03/25 0757    polyethylene glycol (MIRALAX) Packet 17 g  17 g Oral or NG Tube Daily Walter Dempsey MD   17 g at 02/03/25 1003    senna-docusate (SENOKOT-S/PERICOLACE) 8.6-50 MG per tablet 1 tablet  1 tablet Oral or NG Tube BID Walter Dempsey MD   1 tablet at 02/03/25 1003    sevelamer carbonate (RENVELA) tablet 800 mg  800 mg Oral TID w/meals Walter Dempsey MD   800 mg at 02/03/25 1003    sodium chloride (PF) 0.9% PF flush 3 mL  3 mL Intracatheter Q8H Milo Carrion MD         sodium chloride (PF) 0.9% PF flush 3 mL  3 mL Intracatheter Q8H Walter Dempsey MD   3 mL at 02/02/25 2030      No Known Allergies         Physical Exam:   Vitals were reviewed   , Blood pressure 117/84, pulse 103, temperature 97.3  F (36.3  C), temperature source Oral, resp. rate 15, weight 76.4 kg (168 lb 6.9 oz), SpO2 (!) 91%.    Wt Readings from Last 3 Encounters:   02/03/25 76.4 kg (168 lb 6.9 oz)   01/21/25 75.8 kg (167 lb)   01/03/25 78.9 kg (174 lb)       Intake/Output Summary (Last 24 hours) at 2/2/2025 1104  Last data filed at 2/2/2025 0700  Gross per 24 hour   Intake 1479.17 ml   Output 2189 ml   Net -709.83 ml     GENERAL APPEARANCE: Frail.   HEENT:  Eyes/ears/nose/neck grossly normal.   RESP: lungs cta b c good efforts, no crackles, rhonchi or wheezes  CV: RRR  ABDOMEN: soft, NT  EXTREMITIES/SKIN: No edema  NEURO: Awake, alert and conversing normally.         Data:     CBC RESULTS:     Recent Labs   Lab 02/03/25  0539 02/02/25  0658 02/01/25  0605 01/31/25  0424 01/30/25  0429 01/29/25  0355   WBC 15.4* 14.9* 18.2* 14.9* 23.2*  23.2* 14.1*   RBC 3.02* 2.91* 2.97* 2.64* 2.72* 2.74*   HGB 9.3* 9.1* 9.8* 8.1* 8.6* 8.4*   HCT 28.4* 27.3* 28.0* 25.3* 26.2* 26.4*    306 319 256 306 281       Basic Metabolic Panel:  Recent Labs   Lab 02/03/25  1140 02/03/25  0748 02/03/25  0539 02/03/25  0205 02/02/25  2223 02/02/25  1818 02/02/25  0916 02/02/25  0658 02/01/25  0902 02/01/25  0605 01/31/25  0427 01/31/25 0424 01/30/25  0431 01/30/25  0429 01/29/25  0357 01/29/25 0355   NA  --   --  130*  --   --   --   --  133*  --  132*  --  132*  --  132*  --  133*   POTASSIUM  --   --  4.3  --   --   --   --  4.0  --  4.4  --  3.8  --  4.0  --  4.4   CHLORIDE  --   --  94*  --   --   --   --  96*  --  93*  --  96*  --  94*  --  95*   CO2  --   --  22  --   --   --   --  22  --  22  --  22  --  22  --  20*   BUN  --   --  61.8*  --   --   --   --  57.8*  --  58.5*  --  59.7*  --  60.0*  --  56.7*   CR  --   --   6.32*  --   --   --   --  6.31*  --  6.53*  --  6.88*  --  7.06*  --  7.69*   * 167* 207* 185* 149* 90   < > 158*   < > 285*   < > 228*   < > 348*   < > 326*   TERENCE  --   --  9.6  --   --   --   --  9.2  --  9.5  --  8.5*  --  9.2  --  9.6    < > = values in this interval not displayed.       INR  Recent Labs   Lab 02/01/25  0605 01/27/25  2230   INR 1.44* 1.77*      Attestation:   I have reviewed today's relevant vital signs, notes, medications, labs and imaging.    Jennifer Jameson MD  [facility] - Nephrology   [phone]

## 2025-02-03 NOTE — CONSULTS
[facility]    Infectious Disease Progress Note    Date of Service (when I saw the patient): 02/03/2025     Assessment & Plan   Arnulfo Pritchett is a 64 year old male who was admitted on 1/21/2025.     Impression:  65 yo with  severe heart failure (EF 30%), ESRD (on peritoneal dialysis), chronic paroxysmal AF on warfarin, LLE PAD s/p endarterectomy, RCC s/p nephrectomy  Admitted on 1/21/2025 for septic shock secondary to right leg PAD with worsening right heel necrotic ulcer.   Now s/p right common femoral BK popliteal/tibial endarterectomy and right common femoral to below-knee popliteal/tibial PTFE bypass performed on 1/27/25.   Reports of worsening cough, shortness of breath with imaging with layering of the pleural effusion   MRSA nares positive   On zosyn for 11 days   Eschar on the right heel and dry gangrene without concern for active cellulitis, no evidence of osteo on the MRI   Leucocytosis, hard to say if indeed an indicator of worsening infection in a patient with multiple comorbidities and renal failure  proCal of 1.26 of unclear significance and end-stage renal disease patient's  Already finished Finished 12 days of zosyn   New concern per vascular for ischemic changes to the second and the third toe      Recommendations  On CT chest no real concern for pneumonia  Symptoms because of large right pleural effusion which improved after thoracocentesis  No fever  Fluid cultures pending repeat blood cultures are negative so far  ID is following peripherally please call if ques         Evelyn Nielsen MD    Interval History   Tolerating antibiotics ok   No new rashes or issues with antibiotics   Labs reviewed   No changes to past medical, social or family history         Physical Exam   Temp: 97.4  F (36.3  C) Temp src: Oral BP: 117/84 Pulse: 103   Resp: 15 SpO2: (!) 91 % O2 Device: None (Room air)    Vitals:    02/02/25 0300 02/02/25 0635 02/03/25 0642   Weight: 85.5 kg (188 lb 7.9 oz)  82.8 kg (182 lb 8.7 oz) 76.4 kg (168 lb 6.9 oz)     Vital Signs with Ranges  Temp:  [97.4  F (36.3  C)-98.3  F (36.8  C)] 97.4  F (36.3  C)  Pulse:  [] 103  Resp:  [4-28] 15  BP: (107-132)/(77-99) 117/84  SpO2:  [91 %-100 %] 91 %    Constitutional: Awake, alert, cooperative, no apparent distress  Lungs: Clear to auscultation bilaterally, no crackles or wheezing  Cardiovascular: Regular rate and rhythm, normal S1 and S2, and no murmur noted  Abdomen: Normal bowel sounds, soft, non-distended, non-tender  Skin: No rashes, no cyanosis, no edema  Other:    Medications   Current Facility-Administered Medications   Medication Dose Route Frequency Provider Last Rate Last Admin    dextrose 10% infusion   Intravenous Continuous PRN Walter Dempsey MD        dianeal PD LOW calcium-2.5% dex (calcium 2.5 mEq/L) 6,000 mL PERITONEAL DIALYSATE   Dialysis [facility] Supplied PD Star Acuna MD        dianeal PD LOW calcium-2.5% dex (calcium 2.5 mEq/L) 6,000 mL PERITONEAL DIALYSATE   Dialysis [facility] Supplied PD Star Acuna MD        heparin 25,000 units in 0.45% NaCl 250 mL ANTICOAGULANT infusion  0-5,000 Units/hr Intravenous Continuous Walter Dempsey MD 19 mL/hr at 02/03/25 1010 1,900 Units/hr at 02/03/25 1010    Patient is already receiving anticoagulation with heparin, enoxaparin (LOVENOX), warfarin (COUMADIN)  or other anticoagulant medication   Does not apply Continuous PRN Walter Dempsey MD        sodium chloride 0.9 % infusion   Intravenous Continuous Milo Carrion MD 50 mL/hr at 02/01/25 1145 New Bag at 02/01/25 1145    sodium chloride 0.9 % infusion   Intravenous Continuous Walter Dempsey MD 20 mL/hr at 01/30/25 2045 Rate Verify at 01/30/25 2045     Current Facility-Administered Medications   Medication Dose Route Frequency Provider Last Rate Last Admin    - MEDICATION INSTRUCTIONS for Dialysis Patients -   Does not apply See Admin Instructions Walter Dempsey MD        acetaminophen  (TYLENOL) tablet 975 mg  975 mg Oral or Feeding Tube Q8H Walter Dempsey MD   975 mg at 02/03/25 0456    Or    acetaminophen (TYLENOL) Suppository 650 mg  650 mg Rectal Q8H Walter Dempsey MD        [Held by provider] allopurinol (ZYLOPRIM) tablet 200 mg  200 mg Oral QPM Walter Dempsey MD   200 mg at 01/26/25 1957    atorvastatin (LIPITOR) tablet 40 mg  40 mg Oral or Feeding Tube At Bedtime Walter Dempsey MD   40 mg at 02/02/25 2030    calcitRIOL (ROCALTROL) capsule 0.25 mcg  0.25 mcg Oral At Bedtime Walter Dempsey MD   0.25 mcg at 02/02/25 2030    cinacalcet (SENSIPAR) tablet 60 mg  60 mg Oral Q Mon Wed Fri AM Walter Dempsey MD   60 mg at 01/31/25 2019    clopidogrel (PLAVIX) tablet 75 mg  75 mg Oral or Feeding Tube QPM Walter Dempsey MD   75 mg at 02/02/25 2030    insulin aspart (NovoLOG) injection (RAPID ACTING)   Subcutaneous TID w/meals Walter Dempsey MD   3 Units at 02/02/25 1455    insulin aspart (NovoLOG) injection (RAPID ACTING)  1-7 Units Subcutaneous TID AC Walter Dempsey MD   1 Units at 02/03/25 0820    insulin aspart (NovoLOG) injection (RAPID ACTING)  1-5 Units Subcutaneous At Bedtime Walter Dempsey MD        insulin glargine (LANTUS PEN) injection 15 Units  15 Units Subcutaneous Daily Walter Dempsey MD   15 Units at 02/03/25 1007    melatonin tablet 5 mg  5 mg Oral At Bedtime Walter Dempsey MD   5 mg at 02/02/25 2030    metoprolol succinate ER (TOPROL-XL) 24 hr half-tab 12.5 mg  12.5 mg Oral Daily Larry Askew MD   12.5 mg at 02/03/25 1003    midodrine (PROAMATINE) tablet 10 mg  10 mg Oral BID w/meals Walter Dempsey MD   10 mg at 02/03/25 1003    multivitamin RENAL (TRIPHROCAPS) capsule 1 capsule  1 capsule Oral Daily Walter Dempsey MD   1 capsule at 02/03/25 1002    pantoprazole (PROTONIX) 2 mg/mL suspension 40 mg  40 mg Per Feeding Tube QAM  Walter Dempsey MD   40 mg at 01/28/25 0834    Or    pantoprazole (PROTONIX) IV push  "injection 40 mg  40 mg Intravenous QAM AC Walter Dempsey MD   40 mg at 02/03/25 0757    polyethylene glycol (MIRALAX) Packet 17 g  17 g Oral or NG Tube Daily Walter Dempsey MD   17 g at 02/03/25 1003    senna-docusate (SENOKOT-S/PERICOLACE) 8.6-50 MG per tablet 1 tablet  1 tablet Oral or NG Tube BID Walter Dempsey MD   1 tablet at 02/03/25 1003    sevelamer carbonate (RENVELA) tablet 800 mg  800 mg Oral TID w/meals Walter Dempsey MD   800 mg at 02/03/25 1003    sodium chloride (PF) 0.9% PF flush 3 mL  3 mL Intracatheter Q8H Milo Carrion MD        sodium chloride (PF) 0.9% PF flush 3 mL  3 mL Intracatheter Q8H Walter Dempsey MD   3 mL at 02/02/25 2030       Data   All microbiology laboratory data reviewed.  Recent Labs   Lab Test 02/03/25  0539 02/02/25  0658 02/01/25  0605   WBC 15.4* 14.9* 18.2*   HGB 9.3* 9.1* 9.8*   HCT 28.4* 27.3* 28.0*   MCV 94 94 94    306 319     Recent Labs   Lab Test 02/03/25  0539 02/02/25  0658 02/01/25  0605   CR 6.32* 6.31* 6.53*     Recent Labs   Lab Test 01/21/25  1606   SED 86*     No lab results found.    Invalid input(s): \"UC\"    All cultures:  Recent Labs   Lab 02/02/25  1150 02/01/25  1206 02/01/25  1200 01/27/25  2247 01/27/25  2242   CULTURE No growth, less than 1 day No growth after 1 day No growth after 1 day No Growth No Growth      Blood culture:  Results for orders placed or performed during the hospital encounter of 01/21/25   Blood Culture Hand, Right    Collection Time: 02/01/25 12:06 PM    Specimen: Hand, Right; Blood   Result Value Ref Range    Culture No growth after 1 day    Blood Culture Hand, Left    Collection Time: 02/01/25 12:00 PM    Specimen: Hand, Left; Blood   Result Value Ref Range    Culture No growth after 1 day    Blood Culture Hand, Right    Collection Time: 01/27/25 10:47 PM    Specimen: Hand, Right; Blood   Result Value Ref Range    Culture No Growth    Blood Culture Arm, Right    Collection Time: 01/27/25 10:42 PM    " Specimen: Arm, Right; Blood   Result Value Ref Range    Culture No Growth    Results for orders placed or performed during the hospital encounter of 11/27/24   Blood Culture Peripheral Blood    Collection Time: 11/27/24 11:07 AM    Specimen: Peripheral Blood   Result Value Ref Range    Culture No Growth    Results for orders placed or performed during the hospital encounter of 07/09/24   Blood Culture Arm, Left    Collection Time: 07/09/24  1:49 PM    Specimen: Arm, Left; Blood   Result Value Ref Range    Culture No Growth    Blood Culture Peripheral Blood    Collection Time: 07/09/24 12:20 PM    Specimen: Peripheral Blood   Result Value Ref Range    Culture No Growth       Urine culture:  No results found for this or any previous visit.

## 2025-02-03 NOTE — PROVIDER NOTIFICATION
MD Notification    Notified Person: NP    Notified Person Name: Cecilia Stearns NP    Notification Date/Time: 2/3 @ 1:20 PM     Notification Interaction: Am[employer]     Purpose of Notification: Firm, tender R groin site.     Orders Received: provider will see pt    Comments: US ordered

## 2025-02-03 NOTE — PLAN OF CARE
[id number]    BLE CMS unchanged. Left DP + PT pulses +2 w/ Doppler, R DP pulse +1/+2 w/ Doppler.    Orientations: AxOx4, quiet but pleasant; slightly [place].  Vitals/Pain: VSS on RA. Pain managed with scheduled medications.   Tele: SR / ST w/ PVC's  Lines/Drains: PIV x2, infusing Heparin at 1900. Next Xa scheduled for 2/4 AM.  Skin/Wounds: R groin and R popliteal sites WDL. R toes + R heel wounds, dressing CDI. Scab to lateral L foot. Healing ulcer to R shin. Slight blanchable redness to coccyx. Scattered bruises.  GI/: Puree diet with thin liquids; aspiration precautions. Anuric, on PD. BM x2, soft.  Labs: , 185.  Ambulation/Assist: Assist x2 w/ lift; turn and repo q2h.   Sleep Quality: Sleeping between cares. Scheduled melatonin given.    Plan: Pain management. Encourage activity/OOB. Ongoing PT/OT/SLP. Wound cares for R foot.

## 2025-02-03 NOTE — PROGRESS NOTES
Cycler set up per protocol and per treatment orders     Results from previous treatment:  Effluent color and clarity: yellow and clear, no fibrin noted.    Machine was shut off and I was unable to collect numbers.    Cycler Serial Number: [id number]  Total Treatment Volume: 65500 mL  Total treatment time: 9  hrs  Fill Volume: 2000 ml  Last Fill Volume:0 ml  Heater ba.5% 6000mL;  Lot #: [id number];  Expiration Date: 2026  Side Bag #1: 2.5% 6000mL;  Lot #: [id number];  Expiration Date: 3/2025    Casette:  [id number], Exp: 2027    Number of cycles including final fill: 5   Dwell Time: 1:22 minutes  Last Fill Volume: 0ml  Initial Drain Alarm: 0  ml  PD orders reviewed with Patient procedure and ESRD teaching done and questions answered.  Cycler ready for hook-up.    Report given to: 3rd floor MARYSOL Daniel consent form signed YES

## 2025-02-04 ENCOUNTER — APPOINTMENT (OUTPATIENT)
Dept: PHYSICAL THERAPY | Facility: CLINIC | Age: 65
DRG: 853 | End: 2025-02-04
Attending: INTERNAL MEDICINE
Payer: MEDICARE

## 2025-02-04 LAB
ANION GAP SERPL CALCULATED.3IONS-SCNC: 15 MMOL/L (ref 7–15)
BUN SERPL-MCNC: 65.3 MG/DL (ref 8–23)
CALCIUM SERPL-MCNC: 9.2 MG/DL (ref 8.8–10.4)
CHLORIDE SERPL-SCNC: 95 MMOL/L (ref 98–107)
CREAT SERPL-MCNC: 6.2 MG/DL (ref 0.67–1.17)
EGFRCR SERPLBLD CKD-EPI 2021: 9 ML/MIN/1.73M2
ERYTHROCYTE [DISTWIDTH] IN BLOOD BY AUTOMATED COUNT: 18.7 % (ref 10–15)
GLUCOSE BLDC GLUCOMTR-MCNC: 108 MG/DL (ref 70–99)
GLUCOSE BLDC GLUCOMTR-MCNC: 137 MG/DL (ref 70–99)
GLUCOSE BLDC GLUCOMTR-MCNC: 153 MG/DL (ref 70–99)
GLUCOSE BLDC GLUCOMTR-MCNC: 190 MG/DL (ref 70–99)
GLUCOSE SERPL-MCNC: 238 MG/DL (ref 70–99)
HCO3 SERPL-SCNC: 21 MMOL/L (ref 22–29)
HCT VFR BLD AUTO: 25.8 % (ref 40–53)
HGB BLD-MCNC: 8.6 G/DL (ref 13.3–17.7)
HGB BLD-MCNC: 8.6 G/DL (ref 13.3–17.7)
LACTATE SERPL-SCNC: 1.3 MMOL/L (ref 0.7–2)
MAGNESIUM SERPL-MCNC: 1.9 MG/DL (ref 1.7–2.3)
MCH RBC QN AUTO: 30.7 PG (ref 26.5–33)
MCHC RBC AUTO-ENTMCNC: 33.3 G/DL (ref 31.5–36.5)
MCV RBC AUTO: 92 FL (ref 78–100)
PATH REPORT.COMMENTS IMP SPEC: NORMAL
PATH REPORT.FINAL DX SPEC: NORMAL
PATH REPORT.GROSS SPEC: NORMAL
PATH REPORT.MICROSCOPIC SPEC OTHER STN: NORMAL
PHOSPHATE SERPL-MCNC: 5.9 MG/DL (ref 2.5–4.5)
PLATELET # BLD AUTO: 390 10E3/UL (ref 150–450)
POTASSIUM SERPL-SCNC: 4.3 MMOL/L (ref 3.4–5.3)
RBC # BLD AUTO: 2.8 10E6/UL (ref 4.4–5.9)
SODIUM SERPL-SCNC: 131 MMOL/L (ref 135–145)
UFH PPP CHRO-ACNC: 0.38 IU/ML
UFH PPP CHRO-ACNC: 0.47 IU/ML
UFH PPP CHRO-ACNC: 0.66 IU/ML
WBC # BLD AUTO: 16.6 10E3/UL (ref 4–11)

## 2025-02-04 PROCEDURE — 90945 DIALYSIS ONE EVALUATION: CPT | Performed by: INTERNAL MEDICINE

## 2025-02-04 PROCEDURE — 83735 ASSAY OF MAGNESIUM: CPT | Performed by: INTERNAL MEDICINE

## 2025-02-04 PROCEDURE — 90999 UNLISTED DIALYSIS PROCEDURE: CPT

## 2025-02-04 PROCEDURE — 88305 TISSUE EXAM BY PATHOLOGIST: CPT | Mod: 26 | Performed by: PATHOLOGY

## 2025-02-04 PROCEDURE — 250N000009 HC RX 250: Performed by: INTERNAL MEDICINE

## 2025-02-04 PROCEDURE — 85018 HEMOGLOBIN: CPT | Performed by: STUDENT IN AN ORGANIZED HEALTH CARE EDUCATION/TRAINING PROGRAM

## 2025-02-04 PROCEDURE — 84100 ASSAY OF PHOSPHORUS: CPT | Performed by: INTERNAL MEDICINE

## 2025-02-04 PROCEDURE — 80048 BASIC METABOLIC PNL TOTAL CA: CPT | Performed by: INTERNAL MEDICINE

## 2025-02-04 PROCEDURE — 36415 COLL VENOUS BLD VENIPUNCTURE: CPT | Performed by: STUDENT IN AN ORGANIZED HEALTH CARE EDUCATION/TRAINING PROGRAM

## 2025-02-04 PROCEDURE — 97110 THERAPEUTIC EXERCISES: CPT | Mod: GP

## 2025-02-04 PROCEDURE — 85018 HEMOGLOBIN: CPT | Performed by: INTERNAL MEDICINE

## 2025-02-04 PROCEDURE — 99231 SBSQ HOSP IP/OBS SF/LOW 25: CPT | Performed by: PODIATRIST

## 2025-02-04 PROCEDURE — 82310 ASSAY OF CALCIUM: CPT | Performed by: INTERNAL MEDICINE

## 2025-02-04 PROCEDURE — 83605 ASSAY OF LACTIC ACID: CPT | Performed by: HOSPITALIST

## 2025-02-04 PROCEDURE — 250N000011 HC RX IP 250 OP 636: Performed by: INTERNAL MEDICINE

## 2025-02-04 PROCEDURE — 250N000013 HC RX MED GY IP 250 OP 250 PS 637: Performed by: INTERNAL MEDICINE

## 2025-02-04 PROCEDURE — 88112 CYTOPATH CELL ENHANCE TECH: CPT | Mod: 26 | Performed by: PATHOLOGY

## 2025-02-04 PROCEDURE — 120N000013 HC R&B IMCU

## 2025-02-04 PROCEDURE — 36415 COLL VENOUS BLD VENIPUNCTURE: CPT | Performed by: HOSPITALIST

## 2025-02-04 PROCEDURE — 99233 SBSQ HOSP IP/OBS HIGH 50: CPT | Performed by: HOSPITALIST

## 2025-02-04 PROCEDURE — 250N000013 HC RX MED GY IP 250 OP 250 PS 637: Performed by: HOSPITALIST

## 2025-02-04 PROCEDURE — 85520 HEPARIN ASSAY: CPT | Performed by: HOSPITALIST

## 2025-02-04 PROCEDURE — 99024 POSTOP FOLLOW-UP VISIT: CPT

## 2025-02-04 PROCEDURE — 82565 ASSAY OF CREATININE: CPT | Performed by: INTERNAL MEDICINE

## 2025-02-04 RX ORDER — GENTAMICIN SULFATE 1 MG/G
CREAM TOPICAL DAILY PRN
Status: DISCONTINUED | OUTPATIENT
Start: 2025-02-04 | End: 2025-02-04

## 2025-02-04 RX ADMIN — ACETAMINOPHEN 975 MG: 325 TABLET, FILM COATED ORAL at 13:30

## 2025-02-04 RX ADMIN — CLOPIDOGREL BISULFATE 75 MG: 75 TABLET ORAL at 20:25

## 2025-02-04 RX ADMIN — ATORVASTATIN CALCIUM 40 MG: 40 TABLET, FILM COATED ORAL at 20:24

## 2025-02-04 RX ADMIN — POLYETHYLENE GLYCOL 3350 17 G: 17 POWDER, FOR SOLUTION ORAL at 08:46

## 2025-02-04 RX ADMIN — ACETAMINOPHEN 975 MG: 325 TABLET, FILM COATED ORAL at 04:07

## 2025-02-04 RX ADMIN — SENNOSIDES AND DOCUSATE SODIUM 1 TABLET: 50; 8.6 TABLET ORAL at 20:24

## 2025-02-04 RX ADMIN — CALCITRIOL CAPSULES 0.25 MCG 0.25 MCG: 0.25 CAPSULE ORAL at 20:25

## 2025-02-04 RX ADMIN — Medication 12.5 MG: at 08:46

## 2025-02-04 RX ADMIN — SEVELAMER CARBONATE 800 MG: 800 TABLET, FILM COATED ORAL at 08:45

## 2025-02-04 RX ADMIN — PANTOPRAZOLE SODIUM 40 MG: 40 INJECTION, POWDER, FOR SOLUTION INTRAVENOUS at 08:44

## 2025-02-04 RX ADMIN — MIDODRINE HYDROCHLORIDE 10 MG: 2.5 TABLET ORAL at 08:45

## 2025-02-04 RX ADMIN — Medication 1 CAPSULE: at 08:47

## 2025-02-04 RX ADMIN — SEVELAMER CARBONATE 800 MG: 800 TABLET, FILM COATED ORAL at 13:30

## 2025-02-04 RX ADMIN — ACETAMINOPHEN 975 MG: 325 TABLET, FILM COATED ORAL at 20:24

## 2025-02-04 RX ADMIN — Medication 5 MG: at 20:25

## 2025-02-04 RX ADMIN — SEVELAMER CARBONATE 800 MG: 800 TABLET, FILM COATED ORAL at 18:02

## 2025-02-04 RX ADMIN — HEPARIN SODIUM 1700 UNITS/HR: 10000 INJECTION, SOLUTION INTRAVENOUS at 15:11

## 2025-02-04 RX ADMIN — MIDODRINE HYDROCHLORIDE 10 MG: 2.5 TABLET ORAL at 18:02

## 2025-02-04 RX ADMIN — SENNOSIDES AND DOCUSATE SODIUM 1 TABLET: 50; 8.6 TABLET ORAL at 08:45

## 2025-02-04 ASSESSMENT — ACTIVITIES OF DAILY LIVING (ADL)
ADLS_ACUITY_SCORE: 70
ADLS_ACUITY_SCORE: 68
ADLS_ACUITY_SCORE: 68
ADLS_ACUITY_SCORE: 70
ADLS_ACUITY_SCORE: 70
ADLS_ACUITY_SCORE: 68
ADLS_ACUITY_SCORE: 68
ADLS_ACUITY_SCORE: 70
ADLS_ACUITY_SCORE: 68
ADLS_ACUITY_SCORE: 69
ADLS_ACUITY_SCORE: 69
ADLS_ACUITY_SCORE: 68
ADLS_ACUITY_SCORE: 70
ADLS_ACUITY_SCORE: 69
ADLS_ACUITY_SCORE: 68
ADLS_ACUITY_SCORE: 70
ADLS_ACUITY_SCORE: 70
ADLS_ACUITY_SCORE: 69
ADLS_ACUITY_SCORE: 68
ADLS_ACUITY_SCORE: 68
ADLS_ACUITY_SCORE: 70

## 2025-02-04 NOTE — PROGRESS NOTES
Paged about swelling in the right groin.    Small incisional hematoma on exam, minimally tender, unchanged from this morning.  No erythema or drainage.    Duplex ultrasound shows hematoma, no pseudoaneurysm.  There are no signs of wound infection    Continue anticoagulation and we will monitor the groin hematoma, although he is over a week out from surgery now and I would not expect this to worsen.    Jersey Maki MD

## 2025-02-04 NOTE — PROGRESS NOTES
VASCULAR SURGERY PROGRESS NOTE    Subjective:  No acute events overnight, denies any issues. Pain well controlled.     Objective:  Intake/Output Summary (Last 24 hours) at 2/4/2025 0927  Last data filed at 2/3/2025 2122  Gross per 24 hour   Intake 480 ml   Output 200 ml   Net 280 ml     PHYSICAL EXAM:  /80 (BP Location: Right arm)   Pulse 90   Temp 97.8  F (36.6  C) (Oral)   Resp 14   Wt 76.7 kg (169 lb 1.5 oz)   SpO2 96%   BMI 22.31 kg/m    Alert and oriented x4, neurologically intact  Nonlabored breathing, on RA  Abdomen remains soft, nondistended, nontender, groin incision intact, incisions intact  R lower extremity has monophasic(strong) AT signal with slightly weaker PT     ASSESSMENT:  64 year old male who is POD8 s/p R fem-BK pop bypass with PTFE and proximal short GSV interposition graft.Out of ICU in IMC. Worsening R pleural effusion of unclear etiology despite thoracentesis, most recent thoracentesis on 2/2    WBC 16.6    PLAN:  -groin site remains soft, mildly tender, slight hematoma  -NGTD from cultures on 2/2  -oob, physical therapy  -plavix, heparin gtt  -speech involved due to ongoing swallowing issues  -appreciate all teams involved in the patient's care  -podiatry will see    Discussed pt history, exam, assessment and plan with vascular surgery staff    Cecilia Stearns, SABINE, PA-C  VASCULAR SURGERY

## 2025-02-04 NOTE — PROGRESS NOTES
CLINICAL NUTRITION SERVICES - REASSESSMENT NOTE       Malnutrition Status (1/28):    % Intake: < 75% for > 7 days (moderate)  % Weight Loss: Weight loss does not meet criteria   Subcutaneous Fat Loss: Orbital: Severe and Triceps: Severe  Muscle Loss: Wasting of the temples (temporalis muscle): Severe, Clavicles (pectoralis and deltoids): Severe, Shoulders (deltoids): Severe, Thigh (quadriceps): Severe, and Calf (gastrocnemius): Severe  Fluid Accumulation/Edema: None noted  Malnutrition Diagnosis: Severe malnutrition in the context of chronic illness  Malnutrition Present on Admission: Unable to assess     Registered Dietitian Interventions:  Medical food supplement therapy - Expedite Cup once daily + Nepro once daily   Team meeting involving nutrition professional - Patient discussed today during interdisciplinary bedside rounds    Registered Dietitian Interventions:  - Change Nepro to Ensure per patient request.   - Continue Expedite for wound healing - provided rationale to patient       SUBJECTIVE INFORMATION  Assessed patient in room.    CURRENT NUTRITION ORDERS  Diet: Level 5: Minced & Moist Dysphagia Diet  Expedite @ 10 am  Nepro @ 2 pm  Nutrition Support: TF started 1/28, off 1/29.     CURRENT INTAKE/TOLERANCE  - Pt reports eating fine. He dislikes Nepro, not really drinking it. (Had 1 full bottle, another partially consumed). He did eat a cup of Expedite jello, once educated on rationale he was OK with continuing it.   - He prefers Ensure to Nepro.   - Intakes range from 50-75% most often - adequate meals ordered the past couple days, ordered TID when able. Often gets soup or hot cereal.      NEW FINDINGS  Weight: Current wt 76.7 kg.   Skin/wounds: Pressure injury on right heel (unstageable) -- WOCN following and identified 1/28. Podiatry consult today.   GI symptoms: BM x1 on 2/2, BM x1 on 1/29. --> Bowel meds scheduled   Nutrition-relevant labs:   Na 131 (L). BUN 65.3 (H), Cr 6.2 (H), Phos 5.9 (H) -  nightly PD  -200 - elevated  Nutrition-relevant medications:   Medium sliding scale insulin + lantus 15 units daily   Triphrocaps     2/2 - Paracentesis with 2.3 L fluid removed from right lung.     ASSESSED NUTRITION NEEDS  Estimated Energy Needs: 0109-6020 kcals/day (25 - 30 kcals/kg)  Justification: Maintenance  Estimated Protein Needs: 115-135 grams protein/day (1.5 - 1.8 grams of pro/kg)  Justification:  PD and Hypercatabolism with acute illness  Estimated Fluid Needs: 6038-4318 mL/day (1 mL/kcal)  Justification:  Or per MD     EVALUATION OF THE PROGRESS TOWARD GOALS   Previous Goals  Patient to consume % of nutritionally adequate meal trays TID, or the equivalent with supplements/snacks   Evaluation: Progressing    Previous Nutrition Diagnosis  Inadequate oral intake related to recent extubation, slow diet advancement as evidenced by taking % of very small meals, need for oral nutritional supplement   Evaluation: No change - updates below     NUTRITION DIAGNOSIS  Increased nutrient needs related to wound healing, chronic PD as evidenced by low fat/muscle stores, PI to heel.     INTERVENTIONS  See nutrition interventions above    Goals  Patient to consume % of nutritionally adequate meal trays TID, or the equivalent with supplements/snacks.     Monitoring/Evaluation      Progress toward goals will be monitored and evaluated per policy.    Allison Garcia RD, LD  Pager: [phone]  Available on [employer]

## 2025-02-04 NOTE — PROGRESS NOTES
Renal Medicine Progress Note            Assessment/Plan:     Arnulfo Pritchett is a 64 year old male CAD, HTN, HLD, pafib, HFrEF (EF 20-25%), ESRD on PD, DM2, TALI, RCC s/p R nephrectomy (2022), PAD with multiple LE procedures      # End stage renal disease on PD  Chronic PD for last 3.5 years.  Home unit [facility] [place], nephrologist Dr. May  Rx: 5 cycles, 2 L fill volumes, 9 hours, last fill 1.2 L with external.     # Moderate R pleural effusion: Status post thoracentesis on 1/24 and 2/2.  Transudative     # PAD, right heel gangrene, occluded right SFA.  Status post rt fem - below knee popliteal bypass this admission                              Other issues-  Diabetes mellitus  Chronic paroxysmal atrial fibrillation  Secondary hyperparathyroidism and hyperphosphatemia  Severe heart failure with EF of 30% and mild RV dysfunction.      Plan:  Pt has not been getting extraneal here. UF is about 700 ml with 2.5% and he has recurrent pleural effusion .   Will add 4.25% to allow more UF .     #  PD order placed.  2.5% and 4.25% mixed , 2 L fill x 5 over 9 hours          Interval History:     Following for PD management.  No issues with PD overnight.   ml  Afebrile.  Blood pressure okay.           Medications and Allergies:     Current Facility-Administered Medications   Medication Dose Route Frequency Provider Last Rate Last Admin    - MEDICATION INSTRUCTIONS for Dialysis Patients -   Does not apply See Admin Instructions Walter Dempsey MD        acetaminophen (TYLENOL) tablet 975 mg  975 mg Oral or Feeding Tube Q8H Walter Dempsey MD   975 mg at 02/04/25 0407    Or    acetaminophen (TYLENOL) Suppository 650 mg  650 mg Rectal Q8H Walter Dempsey MD        [Held by provider] allopurinol (ZYLOPRIM) tablet 200 mg  200 mg Oral QPM Walter Dempsey MD   200 mg at 01/26/25 1957    atorvastatin (LIPITOR) tablet 40 mg  40 mg Oral or Feeding Tube At Bedtime Walter Dempsey MD   40 mg at 02/03/25 2115     calcitRIOL (ROCALTROL) capsule 0.25 mcg  0.25 mcg Oral At Bedtime Walter Dempsey MD   0.25 mcg at 02/03/25 2115    cinacalcet (SENSIPAR) tablet 60 mg  60 mg Oral Q Mon Wed Fri AM Walter Dempsey MD   60 mg at 02/03/25 2116    clopidogrel (PLAVIX) tablet 75 mg  75 mg Oral or Feeding Tube QPM Walter Dempsey MD   75 mg at 02/03/25 2115    insulin aspart (NovoLOG) injection (RAPID ACTING)   Subcutaneous TID w/meals Walter Dempsey MD   3 Units at 02/04/25 0941    insulin aspart (NovoLOG) injection (RAPID ACTING)  1-7 Units Subcutaneous TID AC Walter Dempsey MD   2 Units at 02/04/25 0847    insulin aspart (NovoLOG) injection (RAPID ACTING)  1-5 Units Subcutaneous At Bedtime Walter Dempsey MD        insulin glargine (LANTUS PEN) injection 15 Units  15 Units Subcutaneous Daily Walter Dempsey MD   15 Units at 02/04/25 0847    melatonin tablet 5 mg  5 mg Oral At Bedtime Walter Dempsey MD   5 mg at 02/03/25 2116    metoprolol succinate ER (TOPROL-XL) 24 hr half-tab 12.5 mg  12.5 mg Oral Daily Larry Askew MD   12.5 mg at 02/04/25 0846    midodrine (PROAMATINE) tablet 10 mg  10 mg Oral BID w/meals Walter Dempsey MD   10 mg at 02/04/25 0845    multivitamin RENAL (TRIPHROCAPS) capsule 1 capsule  1 capsule Oral Daily Walter Dempsey MD   1 capsule at 02/04/25 0847    pantoprazole (PROTONIX) 2 mg/mL suspension 40 mg  40 mg Per Feeding Tube QAM  Walter Dempsey MD   40 mg at 01/28/25 0834    Or    pantoprazole (PROTONIX) IV push injection 40 mg  40 mg Intravenous QAM  Walter Dempsey MD   40 mg at 02/04/25 0844    polyethylene glycol (MIRALAX) Packet 17 g  17 g Oral or NG Tube Daily Walter Dempsey MD   17 g at 02/04/25 0846    senna-docusate (SENOKOT-S/PERICOLACE) 8.6-50 MG per tablet 1 tablet  1 tablet Oral or NG Tube BID Walter Dempsey MD   1 tablet at 02/04/25 0845    sevelamer carbonate (RENVELA) tablet 800 mg  800 mg Oral TID w/meals Walter Dempsey MD   800 mg  at 02/04/25 0845    sodium chloride (PF) 0.9% PF flush 3 mL  3 mL Intracatheter Q8H Milo Carrion MD   3 mL at 02/04/25 0638    sodium chloride (PF) 0.9% PF flush 3 mL  3 mL Intracatheter Q8H Walter Dempsey MD   3 mL at 02/03/25 1237      No Known Allergies         Physical Exam:   Vitals were reviewed   , Blood pressure 113/80, pulse 97, temperature 97.8  F (36.6  C), temperature source Oral, resp. rate 14, weight 76.7 kg (169 lb 1.5 oz), SpO2 97%.    Wt Readings from Last 3 Encounters:   02/04/25 76.7 kg (169 lb 1.5 oz)   01/21/25 75.8 kg (167 lb)   01/03/25 78.9 kg (174 lb)       Intake/Output Summary (Last 24 hours) at 2/2/2025 1104  Last data filed at 2/2/2025 0700  Gross per 24 hour   Intake 1479.17 ml   Output 2189 ml   Net -709.83 ml     GENERAL APPEARANCE: Frail.   HEENT:  Eyes/ears/nose/neck grossly normal.   RESP: lungs cta b c good efforts, no crackles, rhonchi or wheezes  CV: RRR  ABDOMEN: soft, NT  EXTREMITIES/SKIN: No edema  NEURO: Awake, alert and conversing normally.         Data:     CBC RESULTS:     Recent Labs   Lab 02/04/25  0454 02/1959 02/03/25  0539 02/02/25  0658 02/01/25  0605 01/31/25  0424 01/30/25  0429   WBC 16.6*  --  15.4* 14.9* 18.2* 14.9* 23.2*  23.2*   RBC 2.80*  --  3.02* 2.91* 2.97* 2.64* 2.72*   HGB 8.6* 10.1* 9.3* 9.1* 9.8* 8.1* 8.6*   HCT 25.8*  --  28.4* 27.3* 28.0* 25.3* 26.2*     --  376 306 319 256 306       Basic Metabolic Panel:  Recent Labs   Lab 02/04/25  0812 02/04/25  0454 02/03/25  2148 02/03/25  1652 02/03/25  1140 02/03/25  0748 02/03/25  0539 02/02/25  0916 02/02/25  0658 02/01/25  0902 02/01/25  0605 01/31/25  0427 01/31/25  0424 01/30/25  0431 01/30/25  0429   NA  --  131*  --   --   --   --  130*  --  133*  --  132*  --  132*  --  132*   POTASSIUM  --  4.3  --   --   --   --  4.3  --  4.0  --  4.4  --  3.8  --  4.0   CHLORIDE  --  95*  --   --   --   --  94*  --  96*  --  93*  --  96*  --  94*   CO2  --  21*  --   --   --   --  22  --  22   --  22  --  22  --  22   BUN  --  65.3*  --   --   --   --  61.8*  --  57.8*  --  58.5*  --  59.7*  --  60.0*   CR  --  6.20*  --   --   --   --  6.32*  --  6.31*  --  6.53*  --  6.88*  --  7.06*   * 238* 125* 132* 200* 167* 207*   < > 158*   < > 285*   < > 228*   < > 348*   TERENCE  --  9.2  --   --   --   --  9.6  --  9.2  --  9.5  --  8.5*  --  9.2    < > = values in this interval not displayed.       INR  Recent Labs   Lab 02/01/25  0605   INR 1.44*      Attestation:   I have reviewed today's relevant vital signs, notes, medications, labs and imaging.    Jennifer Jameson MD  [facility] - Nephrology   [phone]

## 2025-02-04 NOTE — PROGRESS NOTES
[facility]  PROGRESS NOTE    CHIEF COMPLAINT:      I was asked by Milo Carrion MD  to evaluate this patient for right foot.    PATIENT HISTORY:  Arnulfo Pritchett is a 64 year old male seen in follow up for his right foot. Previously seen following bypass, while in the ICU. Asked to reevaluate for worsening ischemia to digits. At bedside, patient states some pain to lateral ankle and heel.         Review of Systems:  A 10 point review of systems was performed and is positive for that noted above in the patient history.  All other areas are negative.     PAST MEDICAL HISTORY:   Past Medical History:   Diagnosis Date    CAD (coronary artery disease)     Chronic systolic heart failure (H)     ESRD (end stage renal disease) on dialysis (H)     Gastroesophageal reflux disease without esophagitis     Gout     HLD (hyperlipidemia)     TALI (obstructive sleep apnea)     PAD (peripheral artery disease)     S/p RLE stenting of SFA/popliteal arteries    Type 2 diabetes mellitus with diabetic neuropathy (H)         PAST SURGICAL HISTORY:   Past Surgical History:   Procedure Laterality Date    AMPUTATE TOE(S) Left 10/27/2024    Procedure: AMPUTATION, TOE left great toe;  Surgeon: Haley Joseph DPM, Podiatry/Foot and Ankle Surgery;  Location: [facility]    BYPASS GRAFT FEMOROPOPLITEAL Right 1/27/2025    Procedure: RIGHT FEMORAL ARTERY TO RIGHT POPLITEAL ARTERY ENDARTERECTOMY,  COMMON FEMORAL TO POPLITEAL BELOW KNEE COMPOSITE GREATER SAPHENOUS/PTFE BYPASS GRAFT. GREATER SAPHENOUS VEIN HARVEST;  Surgeon: Patrick Hein MD;  Location: [facility] OR    BYPASS GRAFT FEMOROTIBIAL Left 10/25/2024    Procedure: LEFT FEMORAL ENDARTERECTOMY, LEFT FEMORAL TO TIBIAL PERONEAL TRUNK BYPASS WITH LEFT GREAT SEPHANEOUS VEIN, WOUND VAC PLACEMENT ON LEFT GROIN.;  Surgeon: Raul Gray MD;  Location: [facility] OR    BYPASS GRAFT FEMOROTIBIAL Left 10/27/2024    Procedure: CREATION, BYPASS, VASCULAR, FEMORAL TO TIBIAL  REVISION OF BYPASS LEFT COMMON FEMORAL TO TIBIAL.;  Surgeon: Raul Gray MD;  Location:  OR    CV CORONARY ANGIOGRAM N/A 11/27/2024    Procedure: Coronary Angiogram;  Surgeon: Clem Matt MD;  Location:  [facility]    CV LOWER EXTREMITY ANGIOGRAM LEFT Left 10/27/2024    Procedure: Angiogram Lower Extremity Left;  Surgeon: Raul Gray MD;  Location:  OR    CV PCI N/A 11/27/2024    Procedure: Percutaneous Coronary Intervention;  Surgeon: Clem Matt MD;  Location:  [facility]    IR LOWER EXTREMITY ANGIOGRAM LEFT  10/17/2024    IR LOWER EXTREMITY ANGIOGRAM RIGHT  1/23/2025    OR ANGIOGRAM, LOWER EXTREMITY Right 1/27/2025    Procedure: INTRAOPERATIVE ANGIOGRAM;  Surgeon: Patrick Hein MD;  Location:  OR        MEDICATIONS:  Reviewed in Epic. Current.     ALLERGIES:  No Known Allergies     SOCIAL HISTORY:   Social History     Socioeconomic History    Marital status:      Spouse name: Not on file    Number of children: Not on file    Years of education: Not on file    Highest education level: Not on file   Occupational History    Not on file   Tobacco Use    Smoking status: Former     Types: Cigarettes    Smokeless tobacco: Never   Vaping Use    Vaping status: Never Used   Substance and Sexual Activity    Alcohol use: Not Currently     Comment: quit 20 years    Drug use: Never    Sexual activity: Not on file   Other Topics Concern    Not on file   Social History Narrative    Not on file     Social Drivers of Health     Financial Resource Strain: Low Risk  (1/21/2025)    Financial Resource Strain     Within the past 12 months, have you or your family members you live with been unable to get utilities (heat, electricity) when it was really needed?: No   Food Insecurity: Low Risk  (1/21/2025)    Food Insecurity     Within the past 12 months, did you worry that your food would run out before you got money to buy more?: No      Within the past 12 months, did the food you bought just not last and you didn t have money to get more?: No   Transportation Needs: High Risk (1/21/2025)    Transportation Needs     Within the past 12 months, has lack of transportation kept you from medical appointments, getting your medicines, non-medical meetings or appointments, work, or from getting things that you need?: Yes   Physical Activity: Unknown (10/8/2024)    Exercise Vital Sign     Days of Exercise per Week: 0 days     Minutes of Exercise per Session: Not on file   Stress: No Stress Concern Present (10/8/2024)    [employer] [place] [employer] - Occupational Stress Questionnaire     Feeling of Stress : Only a little   Social Connections: Unknown (10/8/2024)    Social Connection and Isolation Panel [NHANES]     Frequency of Communication with Friends and Family: Not on file     Frequency of Social Gatherings with Friends and Family: Patient declined     Attends Jehovah's witness Services: Not on file     Active Member of Clubs or Organizations: Not on file     Attends Club or Organization Meetings: Not on file     Marital Status: Not on file   Interpersonal Safety: Low Risk  (1/21/2025)    Interpersonal Safety     Do you feel physically and emotionally safe where you currently live?: Yes     Within the past 12 months, have you been hit, slapped, kicked or otherwise physically hurt by someone?: No     Within the past 12 months, have you been humiliated or emotionally abused in other ways by your partner or ex-partner?: No   Housing Stability: Low Risk  (1/21/2025)    Housing Stability     Do you have housing? : Yes     Are you worried about losing your housing?: No        FAMILY HISTORY: History reviewed. No pertinent family history.     EXAM:Vitals: /87   Pulse 109   Temp 98  F (36.7  C) (Oral)   Resp 14   Wt 76.7 kg (169 lb 1.5 oz)   SpO2 99%   BMI 22.31 kg/m    BMI= Body mass index is 22.31 kg/m .    LABS:     Last Comprehensive  Metabolic Panel:  Sodium   Date Value Ref Range Status   02/04/2025 131 (L) 135 - 145 mmol/L Final     Potassium   Date Value Ref Range Status   02/04/2025 4.3 3.4 - 5.3 mmol/L Final     Chloride   Date Value Ref Range Status   02/04/2025 95 (L) 98 - 107 mmol/L Final     Carbon Dioxide (CO2)   Date Value Ref Range Status   02/04/2025 21 (L) 22 - 29 mmol/L Final     Anion Gap   Date Value Ref Range Status   02/04/2025 15 7 - 15 mmol/L Final     Glucose   Date Value Ref Range Status   02/04/2025 238 (H) 70 - 99 mg/dL Final     GLUCOSE BY METER POCT   Date Value Ref Range Status   02/03/2025 125 (H) 70 - 99 mg/dL Final     Urea Nitrogen   Date Value Ref Range Status   02/04/2025 65.3 (H) 8.0 - 23.0 mg/dL Final     Creatinine   Date Value Ref Range Status   02/04/2025 6.20 (H) 0.67 - 1.17 mg/dL Final     GFR Estimate   Date Value Ref Range Status   02/04/2025 9 (L) >60 mL/min/1.73m2 Final     Comment:     eGFR calculated using 2021 CKD-EPI equation.     Calcium   Date Value Ref Range Status   02/04/2025 9.2 8.8 - 10.4 mg/dL Final     Lab Results   Component Value Date    WBC 13.0 01/22/2025     Lab Results   Component Value Date    RBC 3.49 01/22/2025     Lab Results   Component Value Date    HGB 10.8 01/22/2025     Lab Results   Component Value Date    HCT 33.8 01/22/2025     Lab Results   Component Value Date     01/22/2025      Lab Results   Component Value Date    INR 2.70 01/22/2025    INR 2.44 01/21/2025    INR 4.7 01/14/2025    INR 2.4 01/09/2025    INR 1.6 01/06/2025    INR 1.9 01/02/2025    INR 1.3 12/30/2024    INR 1.0 12/27/2024    INR 1.1 12/26/2024    INR 1.0 12/24/2024    INR 2.28 12/07/2024    INR 2.59 12/06/2024    INR 2.30 12/05/2024    INR 2.29 12/05/2024    INR 2.39 12/03/2024    INR 1.92 12/02/2024    INR 1.87 12/01/2024    INR 1.64 11/30/2024    INR 1.75 11/29/2024    INR 1.76 11/28/2024        General appearance: Patient is alert and fully cooperative with history & exam.  No sign of  distress is noted during the visit.      Respiratory: Breathing is regular & unlabored while sitting.      HEENT: Hearing is intact to spoken word.  Speech is clear.  No gross evidence of visual impairment that would impact ambulation.      Dermatologic: Right heel: evaluated, with large area of gangrenous/dry tissue to posterior and plantar sites. No drainage from the site. Tender on palpation. Also now noted to l have lateral malleolus eschar.   New ischemia to digits, dorsally is primarily to second and third toe, but plantarly appears to be present to plantar digits 1-5     Vascular: Dorsalis pedis and posterior tibial pulses are difficult to palpate.     Neurologic: Lower extremity sensation is diminished, bilateral foot, to light touch.  No evidence of neurological-based weakness or contracture in the lower extremities.       Musculoskeletal: Patient is ambulatory without an assistive device or brace.  No gross foot or ankle deformity noted.       Psychiatric: Affect is pleasant & appropriate.      All cultures:  Recent Labs   Lab 02/02/25  1150 02/01/25  1206 02/01/25  1200   CULTURE No growth, less than 1 day No growth after 2 days No growth after 2 days        IMAGING:     Arterial US 1/22:    IMPRESSION:  1.  No blood flow demonstrated in the distal superficial femoral, popliteal, and runoff arteries. There may be trickle blood flow in the distal runoff arteries, though difficult to distinguish from artifact.     2.  Uncertain if stent is in the right lower extremity. Previous angiogram images are not available for review.    Right ankle MRI:   IMPRESSION:  1.  There is a soft tissue ulceration over the posterior aspect of the hindfoot with some surrounding edema or cellulitis but no evidence for abscess.  2.  No evidence for osteomyelitis.  3.  Small tibiotalar joint effusion.  4.  No evidence for fracture.  5.  Acute on chronic plantar fasciitis. Plantar calcaneal spurring.  6.  No additional tendinous  or ligamentous pathology.    I personally reviewed the images and agree with the reports as stated.            ASSESSMENT:  Right heel gangrene --> no osteomyelitis   Right forefoot gangrene  Peripheral Arterial Disease s/p right fem - BK pop bypass  Diabetes Mellitus --> hba1c: 5.7     MEDICAL DECISION MAKING:   -Discussed all findings with patient. Chart and imaging reviewed.     -Further evaluation of patient's right foot shows new/worsening ischemia to forefoot, plantarly worse than dorsally. Sites don't show significant signs of infection (drainage, swelling, odor etc.) Discussed with patient ongoing concern regarding his arterial disease. Will defer to vascular for further recommendations.     -Cont to offload in rooke boot at all times.     -Continue wound care per WOC orders.     -Will follow peripherally. [employer] text with questions/concerns.      ANGELITA Gardner Department of Podiatry/Foot & Ankle Surgery  Available via [employer] Text

## 2025-02-04 NOTE — PROGRESS NOTES
[facility]    Medicine Progress Note - Hospitalist Service    Date of Admission:  1/21/2025    Assessment & Plan   Arnulfo Pritchett is a 64 year old male with past medical history of severe heart failure (EF 30%), ESRD (on peritoneal dialysis), chronic paroxysmal AF on warfarin, LLE PAD s/p endarterectomy, RCC s/p nephrectomy admitted on 1/21/2025 for septic shock secondary to right leg PAD with worsening right heel necrotic ulcer. The patient was seen in ED at outside hospital on 1/3/25 and diagnosed with pneumonia, later completing course of Cefdinir. He then presented on 1/21/25 to outside hospital for right leg pain and found to have right leg ulcer and transferred to [facility] for vascular surgery evaluation. Now s/p right common femoral BK popliteal/tibial endarterectomy and right common femoral to below-knee popliteal/tibial PTFE bypass performed on 1/27/25. The patient was admitted to the ICU for requirement of mechanical ventilation and pressor support following surgery for multifactorial shock.      Hospitalist service consulted 1/31/2025 for transfer out of ICU and to assist with medical management along with vascular surgery. Infectious disease also consulted.     Hx PAD of LLE s/p endarterectomy and fem-tibioperoneal trunk bypass on 10/25/24  Hx RLE arterial occlusion s/p SFA angioplasty and stent 5/2024  PAD RLE, right heel gangrene, occluded SFA, no evidence of osteomyelitis  s/p right common femoral and popliteal/tibial endarterectomy, right femoral to popliteal/tibial PTFE bypass 1/27/25   -Found to have critical limb ischemia on admission on January 21, for details see admission note.   -Distributive shock following surgery followed by the ICU service until 1/31.  -Followed by vascular surgery, podiatry, wound care, and ID.  -Coumadin has been held since admission, recently on IV heparin, vascular surgery/podiatry to review when to restart warfarin pending any potential  surgical intervention for worsening ischemic changes in toes of right foot as noted below.  -Has been maintained on statin and Plavix 75 mg daily during this hospital stay.  -Recent IV piperacillin/tazobactam (Zosyn), discontinued 2/2 per ID; follow-up cultures, monitor off antibiotics.  -Any future plans for surgical debridement as per vascular surgery and podiatry.  -Wound care per surgery and WOC.  -Pain control, see hospital orders.  -Worsening ischemic changes in 2nd-5th toes of right foot.  Podiatry and vascular surgery following.  -AM labs.     Multifactorial shock, resolved  ICU admission, weaned off vasopressors 1/30.  -Transitioned to midodrine with increase in prior to admission dose on1/31.  -Monitor blood pressure and heart rate.  -Antibiotics as above.     End-stage renal disease (ESRD), on peritoneal dialysis  Hx of secondary hyperparathyroidism and hyperphosphatemia, phosphorous elevated above baseline of 6 at most recent of 7.9   Nephrology consulted, ongoing follow-up.  Did not require hemodialysis this hospital stay.  -Ongoing peritoneal dialysis, as per nephrology.  -Continue midodrine, nephrology to review, monitor blood pressure.     Acute hypoxemic respiratory failure  Right pleural effusion  Status post thoracentesis 1/24/25, repeat 2/2/25  -Following surgery on admission by vascular surgery was intubated.  Recent pneumonia treated with 3 weeks of Ceftin prior to this admission.  No concern for pneumonia per intensivist service.  -Extubated 1/29/25.  -Right pleural effusion.  Status post thoracentesis1/24/25 with 1.5 L clear yellow fluid removed, likely transudative based on low cell count of 117, , and protein of 1.7. Possibly secondary to reduced peritoneal dialysis during hospitalization vs underlying severe HFrEF (30%).  -CT chest 2/1/25, see report, no clear pneumonia; large right pleural effusion noted.  -Wean down oxygen as able, encourage incentive spirometry.  -Antibiotics as  above, discontinued by ID 2/2.  -ID consulted 2/1 as above, ongoing follow-up.  -S/p repeat Right thoracentesis on 2/2 with 2.3 L of rolan fluid removed; additional studies, cultures (negative to date), cytology (PENDING) - needs review.**    Paroxysmal atrial fibrillation  Chronic anticoagulation prior to admission, warfarin - ON HOLD  Ischemic cardiomyopathy with HLD, CAD s/p multiple stents, and severe EFrEF (30%)   Wide complex tachycardia 2/1/25  Assessment-postoperatively has been maintained on heparin drip.  Coumadin has been held during his hospital stay.  -Echo 1/31-EF 25 to 30%.  Severe global hypokinesis with abnormal septal motion consistent with conduction abnormality.  Severe inferior wall hypokinesis.  LVH.  RV mildly dilated and mildly reduced RV function.  Mild mitral regurgitation.  Mild to moderate mitral stenosis.  Moderate aortic stenosis.  Left ventricular fairly pressures elevated.  -Episode of wide-complex tachycardia noted by nursing staff, 15 beats, up on 2/1/2025.  -Continue inpatient telemetry.  -Consider follow-up cardiology consultation if recurrent wide-complex tachycardia in the setting of left ventricular dysfunction.  -Restarted prior to admission beta-blocker, Toprol XL, at adjusted dose, monitoring heart rate and blood pressure and consider titrating back up to PTA dose as indicated.  -Continue IV heparin anticoagulation, with future transition to oral Coumadin as prior to admission when appropriate from vascular surgery/podiatry standpoint.     Moderate protein calorie nutrition  -Nutrition consulted.  -Speech and language therapy (SLP) consulted, diet per speech therapy.     Hyperglycemia  Type 2 diabetes   hemoglobin A1c 5.7% October 24  On insulin prior to admission.  -Monitor blood sugars.  -Continue insulin, adjust as needed.  -Blood sugars fairly well controlled.  -Monitor for hypoglycemia.    Mixed anion gap acidosis  -Improved respiratory function, monitor.  Anion gap  resolved 1/31.     Abdominal discomfort 1/30, resolved  Noted to have some right upper quadrant pain 1/30.  Now resolved.  LFTs normal.  Was on Zosyn for above surgical issues.   -Monitor abdominal exam.  -Antibiotics managed as noted above.     Anemia of chronic disease  Baseline around 10.  Has been stable in the 8 range.  No signs of bleeding.  -Hemoglobin fairly stable around 9, slightly lower at 8.6 on 2/4/25, plan to for recheck in afternoon on 2/4/25.    Gout  -PTA allopurinol currently on hold, reassess daily.     History of renal cell carcinoma   -Status post right nephrectomy, monitor.     History of obstructive sleep apnea.  By report, intolerant of CPAP   -Monitor, follow-up with outpatient provider.    Weakness and deconditioning  -PT, OT, speech and language therapy (SLP) consulted.  -Increase activity as tolerated.    Disposition  Anticipated discharge timing see Disposition Plan below.  -Anticipated discharge to transitional care unit.  -Social work consulted for discharge planning.          Diet: Snacks/Supplements Adult: Expedite Cup; Between Meals  Snacks/Supplements Adult: Nepro Oral Supplement; Between Meals  Combination Diet Minced and Moist Diet (level 5); Thin Liquids (level 0) (Sit at 90 degrees, alternate textures)    DVT Prophylaxis: IV heparin  Rosario Catheter: Not present  Lines: None     Cardiac Monitoring: ACTIVE order. Indication: Tachyarrhythmias, acute (48 hours)  Code Status: Full Code      Clinically Significant Risk Factors         # Hyponatremia: Lowest Na = 130 mmol/L in last 2 days, will monitor as appropriate  # Hypochloremia: Lowest Cl = 94 mmol/L in last 2 days, will monitor as appropriate      # Hypoalbuminemia: Lowest albumin = 1.8 g/dL at 1/30/2025  3:17 PM, will monitor as appropriate     # Hypertension: Noted on problem list  # Chronic heart failure with reduced ejection fraction: last echo with EF <40%          # DMII: A1C = 6.6 % (Ref range: <5.7 %) within past 6  months    # Severe Malnutrition: based on nutrition assessment    # Financial/Environmental Concerns:           Social Drivers of Health    Tobacco Use: Medium Risk (1/27/2025)    Patient History     Smoking Tobacco Use: Former     Smokeless Tobacco Use: Never   Alcohol Use: Unknown (11/28/2018)    Received from [facility]    AUDIT-C     Frequency of Alcohol Consumption: Monthly or less     Frequency of Binge Drinking: Never   Transportation Needs: High Risk (1/21/2025)    Transportation Needs     Within the past 12 months, has lack of transportation kept you from medical appointments, getting your medicines, non-medical meetings or appointments, work, or from getting things that you need?: Yes   Physical Activity: Unknown (10/8/2024)    Exercise Vital Sign     Days of Exercise per Week: 0 days   Social Connections: Unknown (10/8/2024)    Social Connection and Isolation Panel [NHANES]     Frequency of Social Gatherings with Friends and Family: Patient declined          Disposition Plan     Medically Ready for Discharge: Anticipated in 2-4 Days, unclear pending vascular surgery/podiatry recommendations regarding worsening ischemic changes of toes in right foot; transition off IV heparin back to warfarin; TCU bed availability.         Jimbo Biggs MD  Hospitalist Service  [facility]  Securely message with [employer] (more info)  Text page via [employer] Paging/Directory   ______________________________________________________________________    Interval History   Arnulfo Pritchett was seen this morning.  Toes on the right foot are more black today, has some pain in the toes.  Has been seen by vascular surgery and podiatry, plan regarding management of ischemic toes to be determined.  Noted to have firm/tender right groin yesterday evening, ultrasound revealed a hematoma.  He is frustrated with his ongoing medical  issues and hospitalization.  Otherwise no complaints/concerns for me this morning.  Denies fevers, chest pain, shortness of breath, nausea, abdominal pain.    Physical Exam   Vital Signs: Temp: 97  F (36.1  C) Temp src: Axillary BP: 113/80 Pulse: 97   Resp: 14 SpO2: 95 % O2 Device: None (Room air) Oxygen Delivery: 1 LPM  Weight: 169 lbs 1.49 oz    Constitutional: awake, alert, cooperative, no apparent distress, laying in the hospital bed with the head of the bed elevated  Respiratory: no increased work of breathing, diminished at the right base  Cardiovascular: regular rate and rhythm, normal S1 and S2, no murmur noted  GI: normal bowel sounds, soft, non-distended, non-tender  Skin: warm, dry, Rooke boot in place on right lower extremity, ischemic changes of 2nd-5th toes on right foot  Musculoskeletal: no lower extremity pitting edema present  Neurologic: awake, alert, answers questions appropriately, moves all extremities    Medical Decision Making       40 MINUTES SPENT BY ME on the date of service doing chart review, history, exam, documentation & further activities per the note.      Data     I have personally reviewed the following data over the past 24 hrs:    16.6 (H)  \   8.6 (L)   / 390     131 (L) 95 (L) 65.3 (H) /  153 (H)   4.3 21 (L) 6.20 (H) \       Imaging results reviewed over the past 24 hrs:   Recent Results (from the past 24 hours)   Us Post Vascular Access Low Ext Duplex    Narrative    EXAM: LOWER EXTREMITY ARTERIAL DUPLEX  LOCATION: [facility]  DATE: 2/3/2025    INDICATION: eval right groin hematoma, s p right femoral pop bypass  COMPARISON: None    TECHNIQUE: Gray-scale imaging, spectral wave analysis, and color-flow imaging was performed of the groin.    FINDINGS:   There is a hematoma in the right proximal thigh between the right native SFA and femoropopliteal bypass graft measuring 5.3 x 4.3 x 3.4 cm. No definite flow within this hematoma is identified.    There is a  small fluid collection anterior to the bypass graft in the proximal thigh measuring 1.5 x 1.3 x 0.6 cm. This could represent a small liquefied hematoma.      Impression    IMPRESSION:  1.  HEMATOMA IN THE RIGHT GROIN AND PROBABLE LIQUEFIED HEMATOMA IN THE RIGHT PROXIMAL THIGH AS ABOVE. NO DEFINITE EVIDENCE FOR PSEUDOANEURYSM.

## 2025-02-04 NOTE — PROGRESS NOTES
Cycler set up per protocol and per treatment orders     Results from previous treatment:  Effluent color and clarity: Yellow and clear, no fibrin    Machine was shut off again and I was unable to collect numbers.  I spoke to the patient's nurse about this and asked her to please leave machine on once treatment is done, in order for us to collect information.     Cycler Serial Number: [id number]  Total Treatment Volume: 00495 mL  Total treatment time: 9  hrs  Fill Volume: 2000 ml  Last Fill Volume:0 ml  Heater ba.25% 6000mL;  Lot #: [id number];  Expiration Date: 3/2025  Side Bag #1: 2.5% 6000mL;  Lot #: [id number];  Expiration Date: 2025    Cassette:  [id number], Exp:     Number of cycles including final fill: 5  Dwell Time: 1:22 minutes  Last Fill Volume: 0 ml  Initial Drain Alarm: 0 ml  PD orders reviewed with Patient procedure and ESRD teaching done and questions answered.  Cycler ready for hook-up.    Report given to: MARYSOL eLwis consent form signed yes

## 2025-02-04 NOTE — PLAN OF CARE
"Goal Outcome Evaluation: No Change  VSS, Heparin infusing at 1900 units/hour. T/R q2h. CMS unchanged though right popliteal and Dp slightly weaker per doppler since 0400. Rates pain at \"2\", scheduled tylenol effective. Peritoneal dialysis infusing overnight without issues.                        "

## 2025-02-05 ENCOUNTER — APPOINTMENT (OUTPATIENT)
Dept: GENERAL RADIOLOGY | Facility: CLINIC | Age: 65
DRG: 853 | End: 2025-02-05
Attending: HOSPITALIST
Payer: MEDICARE

## 2025-02-05 LAB
ANION GAP SERPL CALCULATED.3IONS-SCNC: 17 MMOL/L (ref 7–15)
BUN SERPL-MCNC: 75.6 MG/DL (ref 8–23)
CALCIUM SERPL-MCNC: 9.6 MG/DL (ref 8.8–10.4)
CHLORIDE SERPL-SCNC: 90 MMOL/L (ref 98–107)
CREAT SERPL-MCNC: 6.55 MG/DL (ref 0.67–1.17)
EGFRCR SERPLBLD CKD-EPI 2021: 9 ML/MIN/1.73M2
ERYTHROCYTE [DISTWIDTH] IN BLOOD BY AUTOMATED COUNT: 19.2 % (ref 10–15)
GLUCOSE BLDC GLUCOMTR-MCNC: 102 MG/DL (ref 70–99)
GLUCOSE BLDC GLUCOMTR-MCNC: 115 MG/DL (ref 70–99)
GLUCOSE BLDC GLUCOMTR-MCNC: 186 MG/DL (ref 70–99)
GLUCOSE BLDC GLUCOMTR-MCNC: 238 MG/DL (ref 70–99)
GLUCOSE BLDC GLUCOMTR-MCNC: 248 MG/DL (ref 70–99)
GLUCOSE SERPL-MCNC: 250 MG/DL (ref 70–99)
HCO3 SERPL-SCNC: 22 MMOL/L (ref 22–29)
HCT VFR BLD AUTO: 24.7 % (ref 40–53)
HGB BLD-MCNC: 8.4 G/DL (ref 13.3–17.7)
LACTATE SERPL-SCNC: 1.2 MMOL/L (ref 0.7–2)
MAGNESIUM SERPL-MCNC: 2 MG/DL (ref 1.7–2.3)
MCH RBC QN AUTO: 31.7 PG (ref 26.5–33)
MCHC RBC AUTO-ENTMCNC: 34 G/DL (ref 31.5–36.5)
MCV RBC AUTO: 93 FL (ref 78–100)
PHOSPHATE SERPL-MCNC: 5.8 MG/DL (ref 2.5–4.5)
PLATELET # BLD AUTO: 437 10E3/UL (ref 150–450)
POTASSIUM SERPL-SCNC: 4.6 MMOL/L (ref 3.4–5.3)
PROCALCITONIN SERPL IA-MCNC: 1.09 NG/ML
RBC # BLD AUTO: 2.65 10E6/UL (ref 4.4–5.9)
SODIUM SERPL-SCNC: 129 MMOL/L (ref 135–145)
UFH PPP CHRO-ACNC: 0.23 IU/ML
UFH PPP CHRO-ACNC: 0.31 IU/ML
UFH PPP CHRO-ACNC: 0.57 IU/ML
WBC # BLD AUTO: 19.3 10E3/UL (ref 4–11)

## 2025-02-05 PROCEDURE — 250N000013 HC RX MED GY IP 250 OP 250 PS 637: Performed by: HOSPITALIST

## 2025-02-05 PROCEDURE — 36415 COLL VENOUS BLD VENIPUNCTURE: CPT | Performed by: PHYSICIAN ASSISTANT

## 2025-02-05 PROCEDURE — 80048 BASIC METABOLIC PNL TOTAL CA: CPT | Performed by: INTERNAL MEDICINE

## 2025-02-05 PROCEDURE — 84145 PROCALCITONIN (PCT): CPT | Performed by: HOSPITALIST

## 2025-02-05 PROCEDURE — 250N000013 HC RX MED GY IP 250 OP 250 PS 637: Performed by: INTERNAL MEDICINE

## 2025-02-05 PROCEDURE — 83735 ASSAY OF MAGNESIUM: CPT | Performed by: INTERNAL MEDICINE

## 2025-02-05 PROCEDURE — 85520 HEPARIN ASSAY: CPT | Performed by: HOSPITALIST

## 2025-02-05 PROCEDURE — 84100 ASSAY OF PHOSPHORUS: CPT | Performed by: INTERNAL MEDICINE

## 2025-02-05 PROCEDURE — 36415 COLL VENOUS BLD VENIPUNCTURE: CPT | Performed by: HOSPITALIST

## 2025-02-05 PROCEDURE — 83605 ASSAY OF LACTIC ACID: CPT | Performed by: HOSPITALIST

## 2025-02-05 PROCEDURE — 87040 BLOOD CULTURE FOR BACTERIA: CPT | Performed by: PHYSICIAN ASSISTANT

## 2025-02-05 PROCEDURE — 250N000009 HC RX 250: Performed by: INTERNAL MEDICINE

## 2025-02-05 PROCEDURE — 90945 DIALYSIS ONE EVALUATION: CPT | Performed by: INTERNAL MEDICINE

## 2025-02-05 PROCEDURE — 99024 POSTOP FOLLOW-UP VISIT: CPT

## 2025-02-05 PROCEDURE — 71045 X-RAY EXAM CHEST 1 VIEW: CPT

## 2025-02-05 PROCEDURE — 120N000013 HC R&B IMCU

## 2025-02-05 PROCEDURE — 36415 COLL VENOUS BLD VENIPUNCTURE: CPT | Performed by: INTERNAL MEDICINE

## 2025-02-05 PROCEDURE — 250N000011 HC RX IP 250 OP 636: Performed by: INTERNAL MEDICINE

## 2025-02-05 PROCEDURE — 85018 HEMOGLOBIN: CPT | Performed by: INTERNAL MEDICINE

## 2025-02-05 PROCEDURE — 90945 DIALYSIS ONE EVALUATION: CPT

## 2025-02-05 PROCEDURE — G0463 HOSPITAL OUTPT CLINIC VISIT: HCPCS

## 2025-02-05 PROCEDURE — 99024 POSTOP FOLLOW-UP VISIT: CPT | Performed by: PHYSICIAN ASSISTANT

## 2025-02-05 PROCEDURE — 99233 SBSQ HOSP IP/OBS HIGH 50: CPT | Performed by: HOSPITALIST

## 2025-02-05 RX ORDER — GENTAMICIN SULFATE 1 MG/G
CREAM TOPICAL DAILY PRN
Status: DISCONTINUED | OUTPATIENT
Start: 2025-02-05 | End: 2025-02-05

## 2025-02-05 RX ADMIN — CALCITRIOL CAPSULES 0.25 MCG 0.25 MCG: 0.25 CAPSULE ORAL at 20:26

## 2025-02-05 RX ADMIN — SEVELAMER CARBONATE 800 MG: 800 TABLET, FILM COATED ORAL at 09:01

## 2025-02-05 RX ADMIN — ACETAMINOPHEN 975 MG: 325 TABLET, FILM COATED ORAL at 20:26

## 2025-02-05 RX ADMIN — SENNOSIDES AND DOCUSATE SODIUM 1 TABLET: 50; 8.6 TABLET ORAL at 20:26

## 2025-02-05 RX ADMIN — CINACALCET 60 MG: 30 TABLET ORAL at 20:26

## 2025-02-05 RX ADMIN — ACETAMINOPHEN 975 MG: 325 TABLET, FILM COATED ORAL at 05:45

## 2025-02-05 RX ADMIN — Medication 5 MG: at 20:26

## 2025-02-05 RX ADMIN — MIDODRINE HYDROCHLORIDE 10 MG: 2.5 TABLET ORAL at 09:01

## 2025-02-05 RX ADMIN — SEVELAMER CARBONATE 800 MG: 800 TABLET, FILM COATED ORAL at 11:56

## 2025-02-05 RX ADMIN — ACETAMINOPHEN 975 MG: 325 TABLET, FILM COATED ORAL at 11:55

## 2025-02-05 RX ADMIN — CLOPIDOGREL BISULFATE 75 MG: 75 TABLET ORAL at 20:26

## 2025-02-05 RX ADMIN — POLYETHYLENE GLYCOL 3350 17 G: 17 POWDER, FOR SOLUTION ORAL at 09:03

## 2025-02-05 RX ADMIN — Medication 1 CAPSULE: at 09:01

## 2025-02-05 RX ADMIN — ATORVASTATIN CALCIUM 40 MG: 40 TABLET, FILM COATED ORAL at 20:26

## 2025-02-05 RX ADMIN — HEPARIN SODIUM 1700 UNITS/HR: 10000 INJECTION, SOLUTION INTRAVENOUS at 06:12

## 2025-02-05 RX ADMIN — SENNOSIDES AND DOCUSATE SODIUM 1 TABLET: 50; 8.6 TABLET ORAL at 09:01

## 2025-02-05 RX ADMIN — PANTOPRAZOLE SODIUM 40 MG: 40 INJECTION, POWDER, FOR SOLUTION INTRAVENOUS at 09:03

## 2025-02-05 RX ADMIN — Medication 12.5 MG: at 09:01

## 2025-02-05 RX ADMIN — MIDODRINE HYDROCHLORIDE 10 MG: 2.5 TABLET ORAL at 17:00

## 2025-02-05 RX ADMIN — SEVELAMER CARBONATE 800 MG: 800 TABLET, FILM COATED ORAL at 17:01

## 2025-02-05 ASSESSMENT — ACTIVITIES OF DAILY LIVING (ADL)
ADLS_ACUITY_SCORE: 69
ADLS_ACUITY_SCORE: 64
ADLS_ACUITY_SCORE: 69
ADLS_ACUITY_SCORE: 64
ADLS_ACUITY_SCORE: 69
ADLS_ACUITY_SCORE: 68
ADLS_ACUITY_SCORE: 69
ADLS_ACUITY_SCORE: 69
ADLS_ACUITY_SCORE: 68
ADLS_ACUITY_SCORE: 69
ADLS_ACUITY_SCORE: 64
ADLS_ACUITY_SCORE: 69
ADLS_ACUITY_SCORE: 69
ADLS_ACUITY_SCORE: 64
ADLS_ACUITY_SCORE: 69
ADLS_ACUITY_SCORE: 68
ADLS_ACUITY_SCORE: 69
ADLS_ACUITY_SCORE: 64

## 2025-02-05 NOTE — PLAN OF CARE
"Patient Name: Sherman  MRN: [id number]  Date of Admission: 1/21/2025  Reason for Admission: Cellulitis  [place] [facility]    Vitals:   BP Readings from Last 1 Encounters:  02/04/25 : 113/80    Pulse Readings from Last 1 Encounters:  02/04/25 : 97    Wt Readings from Last 1 Encounters:  02/04/25 : 76.7 kg (169 lb 1.5 oz)    Ht Readings from Last 1 Encounters:  01/21/25 : 1.854 m (6' 1\")    Estimated body mass index is 22.31 kg/m  as calculated from the following:    Height as of an earlier encounter on 1/21/25: 1.854 m (6' 1\").    Weight as of this encounter: 76.7 kg (169 lb 1.5 oz).  Temp Readings from Last 1 Encounters:  02/04/25 : 97.8  F (36.6  C) (Oral)      Pain: Pain goal 1/10 Pain Rating 3/10 Effective pain medication/regimen tylenol    CV Surgery Patient: No    Assessment    Resp: SOB, MORTENSEN, RA/0.5NC at times  Telemetry: NSR  Neuro: slightly delirious, AxOx2-3, disoriented to time and situation this AM AxOx4 closer to the evening, denies numbness to BLE, pulses: Bilateral DP weak with doppler, Bilateral PT +2 with doppler  GI/: continent of urine- makes little, incontinent bowel, Bm x1 today   Skin/Wounds: R heel PI, R middle toes black/purple, LLQ dialysis port, R shin   Lines/Drains: PIV infusing heparin 1700u/hr  Activity: A2,T/R  Sleep: per pt report not sleeping well at night   Abnormal Labs: following hgb and BG, next hepxa is 2112    Aggression Stop Light: Green          Patient Care Plan: patient up in the chair x2, healthy appetite today, wound change done on R shin and R heel. Podiatry following- see note, PD tonight set up- nursing asked to keep machine on so they can grab the numbers.   "

## 2025-02-05 NOTE — PROGRESS NOTES
Writer took over at 0000 and noticed dialysis machine was unplugged not dwelling. Writer called dialysis help line and had  walk writer through the steps of setting the machine set-up. Dressing was changed and peritoneal dialysis was started at 0245.

## 2025-02-05 NOTE — PROGRESS NOTES
VASCULAR SURGERY PROGRESS NOTE    Subjective:  No acute events overnight, denies any issues. Pain well controlled, intermittently requiring 2L NC. Per chart review, intermittently confused. Ischemic changes to R 2nd/3rd toe    Objective:  Intake/Output Summary (Last 24 hours) at 2/5/2025 0813  Last data filed at 2/5/2025 0600  Gross per 24 hour   Intake 200 ml   Output 155 ml   Net 45 ml     PHYSICAL EXAM:  /87   Pulse 105   Temp 98.8  F (37.1  C) (Oral)   Resp 26   Wt 81.8 kg (180 lb 5.4 oz)   SpO2 97%   BMI 23.79 kg/m    Alert and oriented x4, neurologically intact  Nonlabored breathing, on RA  Abdomen remains soft, nondistended, nontender, groin incision intact, incisions intact  R lower extremity has monophasic(strong) AT signal with slightly weaker PT   groin site remains soft, mildly tender, slight hematoma          ASSESSMENT:  64 year old male who is POD8 s/p R fem-BK pop bypass with PTFE and proximal short GSV interposition graft.Out of ICU in [facility]. Worsening R pleural effusion of unclear etiology despite thoracentesis, most recent thoracentesis on 2/2    WBC: 19.3    PLAN:  -NGTD from cultures on 2/2  -oob, physical therapy  -given several days of increasing WBC and tachycardic, possible need for antibiotics, ID to see   -plavix, heparin drip  -speech involved  -appreciate all teams involved in patient's care    Discussed pt history, exam, assessment and plan with vascular surgery staff    Cecilia Stearns NP  VASCULAR SURGERY                    [Headaches] : no headaches [Abdominal Pain] : no abdominal pain [FreeTextEntry2] : Mark is a 13 year 3 month old male with Hashimotos thyroiditis who returns for follow up. He was initially referred in 8/2022.  Pediatrician expressed concern about growth at Mark's check up in 5/2022 and he was referred to a pediatric endocrinologist, Susan Sanchez. Blood work was performed on 5/18/22 that showed: .28 uIU/mL (H), free T4 0.6 ng/dL (L); CBC, CMP, IgA 161 mg/dL, tissue transglutaminase IgA Ab < 1.0 U/mL, ESR 11 mm/hr, IGF-1 146 ng/mL, IGF-BP3 4.2 mg/L. Additional lab work from 5/23/22: thyroid peroxidase Ab 395 IU/mL - consistent with Hashimotos thyroiditis. He was started on levothyroxine 75 mcg daily. Repeat lab work on 7/7/22 showed a normal TSH 1.2 uIU/mL. Labs again repeated on 8/18/22 and showed: TSH 0.28 uIU/mL (L), free T4 1.5 ng/dL (H). The dose was then changed to 50 mcg daily. Family then transferred care to me on 8/30/22 after Dr. Sanchez retired. Ordered repeat labs to be performed in 6-8 weeks; never received, but Mark returned for follow up in 10/2022. TSH elevated at 40 uIU/mL and levothyroxine increased to 75 mcg daily. He was last seen in 5/2023 and TFTs were normal.   Mark now returns for follow up. No missed doses of the levothyroxine.

## 2025-02-05 NOTE — PLAN OF CARE
Vital signs stable on 2L nasal cannula. Alert and oriented with moments of confusion (pulled IV's). Lung sounds diminished. Bowel sounds active, flatus present. Lower extremity wound dressing clean dry and intact. Patient denies pain. Up with lift. Tolerating minced moist diet. CMS/neuros at baseline with doppler pulses in lower extremities. Voiding adequately to urinal. Tele sinus tachycardia. Heparin gtt continued.

## 2025-02-05 NOTE — CONSULTS
[facility]    Infectious Disease Progress Note    Date of Service (when I saw the patient): 02/05/2025     Assessment & Plan   Arnulfo Pritchett is a 64 year old male who was admitted on 1/21/2025.     Impression:  65 yo with severe heart failure (EF 30%), ESRD (on peritoneal dialysis), chronic paroxysmal AF on warfarin, LLE PAD s/p endarterectomy, RCC s/p nephrectomy  Admitted on 1/21/2025 for septic shock secondary to right leg PAD with worsening right heel necrotic ulcer.   Now s/p right common femoral BK popliteal/tibial endarterectomy and right common femoral to below-knee popliteal/tibial PTFE bypass performed on 1/27/25.   Reports of worsening cough, shortness of breath with imaging with layering of the pleural effusion - s/p thoracentesis with cultures negative   MRSA nares positive   Eschar on the right heel and dry gangrene without concern for active cellulitis, no evidence of osteo on the MRI   Leukocytosis, hard to say if indeed an indicator of worsening infection in a patient with multiple comorbidities and renal failure  Mildly elevated proCal of unclear significance in end-stage renal disease patient's  Already finished Finished 12 days of zosyn   New concern per vascular for ischemic changes to the second and the third toe. Evaluated by Podiatry with no clear signs of infection in foot.   Afebrile. Ongoing leukocytosis may be due to thigh hematoma.      Recommendations  No clear evidence of infection presently. Blood cultures have been negative. Pleural cultures are no growth. Hematoma in groin can be source of ongoing leukocytosis. He is having some tachycardia, but appears he has had it throughout hospital stay, even while on Zosyn.   Will repeat blood cultures.   He just finished over 12 days of Zosyn. Maintain off antibiotics for now and observe.     Patient and plan discussed with Dr. Nielsen.       Brook Neal PA-C    Interval History   Very lethargic. Would wake to answer  some questions. Complains of pain in right foot. Denies abdominal pain or SOB.         Physical Exam   Temp: 97.4  F (36.3  C) Temp src: Oral BP: (!) 124/96 Pulse: 112   Resp: 20 SpO2: 92 % O2 Device: None (Room air) Oxygen Delivery: 1 LPM  Vitals:    02/03/25 0642 02/04/25 0242 02/05/25 0554   Weight: 76.4 kg (168 lb 6.9 oz) 76.7 kg (169 lb 1.5 oz) 81.8 kg (180 lb 5.4 oz)     Vital Signs with Ranges  Temp:  [97  F (36.1  C)-98.8  F (37.1  C)] 97.4  F (36.3  C)  Pulse:  [] 112  Resp:  [10-30] 20  BP: (109-124)/(61-96) 124/96  SpO2:  [92 %-100 %] 92 %    Constitutional: Awake, alert, cooperative, no apparent distress  Lungs: Clear to auscultation bilaterally, no crackles or wheezing  Cardiovascular: Tachycardic, normal S1 and S2, and no murmur noted  Abdomen: Normal bowel sounds, soft, non-distended, non-tender  Skin: Some edema about RLE with noted ischemic right 2nd and 3rd toes. SMall residual right groin hematoma.   Other:    Medications   Current Facility-Administered Medications   Medication Dose Route Frequency Provider Last Rate Last Admin    dextrose 10% infusion   Intravenous Continuous PRN Walter Dempsey MD        dianeal PD LOW calcium-2.5% dex (calcium 2.5 mEq/L) 6,000 mL PERITONEAL DIALYSATE   Dialysis [facility] Supplied PD Jennifer Jameson MD        heparin 25,000 units in 0.45% NaCl 250 mL ANTICOAGULANT infusion  0-5,000 Units/hr Intravenous Continuous Walter Dempsey MD 18.5 mL/hr at 02/05/25 0721 1,850 Units/hr at 02/05/25 0721    Patient is already receiving anticoagulation with heparin, enoxaparin (LOVENOX), warfarin (COUMADIN)  or other anticoagulant medication   Does not apply Continuous PRN Walter Dempsey MD         Current Facility-Administered Medications   Medication Dose Route Frequency Provider Last Rate Last Admin    - MEDICATION INSTRUCTIONS for Dialysis Patients -   Does not apply See Admin Instructions Walter Dempsey MD        acetaminophen (TYLENOL) tablet 975 mg  97  mg Oral or Feeding Tube Q8H Walter Dempsey MD   975 mg at 02/05/25 1155    Or    acetaminophen (TYLENOL) Suppository 650 mg  650 mg Rectal Q8H Walter Dempsey MD        [Held by provider] allopurinol (ZYLOPRIM) tablet 200 mg  200 mg Oral QPM Walter Dempsey MD   200 mg at 01/26/25 1957    atorvastatin (LIPITOR) tablet 40 mg  40 mg Oral or Feeding Tube At Bedtime Walter Dempsey MD   40 mg at 02/04/25 2024    calcitRIOL (ROCALTROL) capsule 0.25 mcg  0.25 mcg Oral At Bedtime Walter Dempsey MD   0.25 mcg at 02/04/25 2025    cinacalcet (SENSIPAR) tablet 60 mg  60 mg Oral Q Mon Wed Fri AM Walter Dempsey MD   60 mg at 02/03/25 2116    clopidogrel (PLAVIX) tablet 75 mg  75 mg Oral or Feeding Tube QPM Walter Dempsey MD   75 mg at 02/04/25 2025    insulin aspart (NovoLOG) injection (RAPID ACTING)   Subcutaneous TID w/meals Walter Dempsey MD   3 Units at 02/04/25 1211    insulin aspart (NovoLOG) injection (RAPID ACTING)  1-7 Units Subcutaneous TID AC Walter Dempsey MD   3 Units at 02/05/25 1223    insulin aspart (NovoLOG) injection (RAPID ACTING)  1-5 Units Subcutaneous At Bedtime Walter Dempsey MD        insulin glargine (LANTUS PEN) injection 15 Units  15 Units Subcutaneous Daily Walter Dempsey MD   15 Units at 02/05/25 0841    melatonin tablet 5 mg  5 mg Oral At Bedtime Walter Dempsey MD   5 mg at 02/04/25 2025    metoprolol succinate ER (TOPROL-XL) 24 hr half-tab 12.5 mg  12.5 mg Oral Daily Larry Askew MD   12.5 mg at 02/05/25 0901    midodrine (PROAMATINE) tablet 10 mg  10 mg Oral BID w/meals Walter Dempsey MD   10 mg at 02/05/25 0901    multivitamin RENAL (TRIPHROCAPS) capsule 1 capsule  1 capsule Oral Daily Walter Dempsey MD   1 capsule at 02/05/25 0901    pantoprazole (PROTONIX) 2 mg/mL suspension 40 mg  40 mg Per Feeding Tube QAM  Walter Dempsey MD   40 mg at 01/28/25 0834    Or    pantoprazole (PROTONIX) IV push injection 40 mg  40 mg Intravenous  QAM AC Walter Dempsey MD   40 mg at 02/05/25 0903    polyethylene glycol (MIRALAX) Packet 17 g  17 g Oral or NG Tube Daily Walter Dempsey MD   17 g at 02/05/25 0903    senna-docusate (SENOKOT-S/PERICOLACE) 8.6-50 MG per tablet 1 tablet  1 tablet Oral or NG Tube BID Walter Dempsey MD   1 tablet at 02/05/25 0901    sevelamer carbonate (RENVELA) tablet 800 mg  800 mg Oral TID w/meals Walter Dempsey MD   800 mg at 02/05/25 1156    sodium chloride (PF) 0.9% PF flush 3 mL  3 mL Intracatheter Q8H Walter Dempsey MD   3 mL at 02/03/25 1237       Data   All microbiology laboratory data reviewed.  Recent Labs   Lab Test 02/05/25  0532 02/04/25  1417 02/04/25  0454 02/03/25  1959/25  0539   WBC 19.3*  --  16.6*  --  15.4*   HGB 8.4* 8.6* 8.6*   < > 9.3*   HCT 24.7*  --  25.8*  --  28.4*   MCV 93  --  92  --  94     --  390  --  376    < > = values in this interval not displayed.     Recent Labs   Lab Test 02/05/25  0532 02/04/25  0454 02/03/25  0539   CR 6.55* 6.20* 6.32*     Recent Labs   Lab Test 01/21/25  1606   SED 86*        02/02/2025 1150 02/03/2025 1341 Cell count with differential fluid [[id number]]    (Abnormal)   Pleural fluid from Pleural Cavity, Right    Final result Component Value   No component results          02/02/2025 1150 02/05/2025 0738 Pleural fluid Aerobic Bacterial Culture Routine With Gram Stain [[id number]]   Pleural fluid from Pleural Cavity, Right    Preliminary result Component Value   Culture No growth after 2 days P   Gram Stain Result No organisms seen P    4+ WBC seen P    Predominantly PMNs             02/02/2025 1150 02/02/2025 1240 Glucose fluid [[id number]]    Pleural fluid from Pleural Cavity, Right    Final result Component Value Units   Glucose Fluid Source Pleural Cavity, Right    Glucose fluid 133 mg/dL          02/02/2025 1150 02/02/2025 1240 Lactate dehydrogenase fluid [[id number]]    Pleural fluid from Pleural Cavity, Right    Final result  Component Value Units   LD Fluid Source Pleural Cavity, Right    Lactate dehydrogenase fluid 286 U/L          02/02/2025 1150 02/02/2025 1240 Protein fluid [[id number]]    Pleural fluid from Pleural Cavity, Right    Final result Component Value Units   Protein Fluid Source Pleural Cavity, Right    Protein Total Fluid 1.8 g/dL          02/02/2025 1150 02/04/2025 1509 Cytology, non-gynecologic [[id number]]   Pleural fluid from Pleural Cavity, Right    Final result Component Value   Final Diagnosis A.  Right pleural fluid, thoracentesis:     Interpretation:     Negative for malignancy     Other Findings:     Acute and chronic inflammation present.     Adequacy:     Satisfactory for evaluation       Gross Description A(A). Pleural Cavity, Right, Pleural Fluid:  Received 85 ml of hazy, orange fluid, processed as 1 Pap stained Autocyte,  1 Burks stained cytospin and one hematoxylin and eosin stained cell block.     Microscopic Description Microscopic examination was performed.  Results are incorporated into the final diagnosis.   Performing Labs The technical component of this testing was completed at [facility] and [facility].    Stain controls for all stains resulted within this report have been reviewed and show appropriate reactivity.          02/02/2025 1150 02/03/2025 1341 Cell Count Body Fluid [[id number]]    (Abnormal)   Pleural fluid from Pleural Cavity, Right    Final result Component Value Units   Color Pink Abnormal     Clarity Hazy Abnormal     Cell Count Fluid Source Pleural Cavity, Right    Total Nucleated Cells 1,078 /uL          02/02/2025 1150 02/03/2025 1341 Differential Body Fluid [[id number]]    Pleural fluid from Pleural Cavity, Right    Final result Component Value Units   % Neutrophils 14 %   % Lymphocytes 5 %   % Monocyte/Macrophages 65 %   % Lining Cells 16 %   Pathologist Review Comments (Body Fluid Diff) Reactive mesothelial cells  and macrophages.  Negative for malignancy. Please also correlate with concurrent formal cytology ([id number])           02/01/2025 1206 02/04/2025 1631 Blood Culture Hand, Right [[id number]]    Blood from Hand, Right    Preliminary result Component Value   Culture No growth after 3 days P             02/01/2025 1200 02/04/2025 1631 Blood Culture Hand, Left [[id number]]   Blood from Hand, Left    Preliminary result Component Value   Culture No growth after 3 days P

## 2025-02-05 NOTE — PLAN OF CARE
Oriented x4. Pt remains on heparin gtt. RLE edema and tenderness, groin still firm. Pt pain @ 2. Wounds to feet on RLE. Sinus tachy, BP stable. On 1L NC. Tolerated pills with water well. LBM 2/4. Voiding w/out difficulty.

## 2025-02-05 NOTE — PROGRESS NOTES
Renal Medicine Progress Note            Assessment/Plan:     Arnulfo Pritchett is a 64 year old male CAD, HTN, HLD, pafib, HFrEF (EF 20-25%), ESRD on PD, DM2, TALI, RCC s/p R nephrectomy (2022), PAD with multiple LE procedures      # End stage renal disease on PD  Chronic PD for last 3.5 years.  Home unit [facility], nephrologist Dr. May  Rx: 5 cycles, 2 L fill volumes, 9 hours, last fill 1.2 L with external.     # Moderate R pleural effusion: Status post thoracentesis on 1/24 and 2/2.  Transudative     # PAD, right heel gangrene, occluded right SFA.  Status post rt fem - below knee popliteal bypass this admission                              Other issues-  Diabetes mellitus  Chronic paroxysmal atrial fibrillation  Secondary hyperparathyroidism and hyperphosphatemia  Severe heart failure with EF of 30% and mild RV dysfunction.      Plan:  Pt has not been getting extraneal here. UF is about 700 ml with 2.5% and around 1600 cc with mix of 4.5% - 2.5%    #  PD order placed.  2.5% , 2 L fill x 5 over 9 hours  Likely mix of 4.5% and 2.5%  tomorrow           Interval History:     Following for PD management.  No issues with PD overnight.  Patient was not 12 5/5 on my evaluation.  Ultrafiltration was 1570 cc  Afebrile.  Blood pressure okay.           Medications and Allergies:     Current Facility-Administered Medications   Medication Dose Route Frequency Provider Last Rate Last Admin    - MEDICATION INSTRUCTIONS for Dialysis Patients -   Does not apply See Admin Instructions Walter Dempsey MD        acetaminophen (TYLENOL) tablet 975 mg  975 mg Oral or Feeding Tube Q8H Walter Dempsey MD   975 mg at 02/05/25 0545    Or    acetaminophen (TYLENOL) Suppository 650 mg  650 mg Rectal Q8H Walter Dempsey MD        [Held by provider] allopurinol (ZYLOPRIM) tablet 200 mg  200 mg Oral QPM Walter Dempsey MD   200 mg at 01/26/25 1957    atorvastatin (LIPITOR) tablet 40 mg  40 mg Oral or Feeding Tube At Bedtime  Walter Dempsey MD   40 mg at 02/04/25 2024    calcitRIOL (ROCALTROL) capsule 0.25 mcg  0.25 mcg Oral At Bedtime Walter Dempsey MD   0.25 mcg at 02/04/25 2025    cinacalcet (SENSIPAR) tablet 60 mg  60 mg Oral Q Mon Wed Fri AM Walter Dempsey MD   60 mg at 02/03/25 2116    clopidogrel (PLAVIX) tablet 75 mg  75 mg Oral or Feeding Tube QPM Walter Dempsey MD   75 mg at 02/04/25 2025    insulin aspart (NovoLOG) injection (RAPID ACTING)   Subcutaneous TID w/meals Walter Dempsey MD   3 Units at 02/04/25 1211    insulin aspart (NovoLOG) injection (RAPID ACTING)  1-7 Units Subcutaneous TID AC Walter Dempsey MD   2 Units at 02/05/25 0854    insulin aspart (NovoLOG) injection (RAPID ACTING)  1-5 Units Subcutaneous At Bedtime Walter Dempsey MD        insulin glargine (LANTUS PEN) injection 15 Units  15 Units Subcutaneous Daily Walter Dempsey MD   15 Units at 02/05/25 0841    melatonin tablet 5 mg  5 mg Oral At Bedtime Walter Dempsey MD   5 mg at 02/04/25 2025    metoprolol succinate ER (TOPROL-XL) 24 hr half-tab 12.5 mg  12.5 mg Oral Daily Larry Askew MD   12.5 mg at 02/05/25 0901    midodrine (PROAMATINE) tablet 10 mg  10 mg Oral BID w/meals Walter Dempsey MD   10 mg at 02/05/25 0901    multivitamin RENAL (TRIPHROCAPS) capsule 1 capsule  1 capsule Oral Daily Walter Dempsey MD   1 capsule at 02/05/25 0901    pantoprazole (PROTONIX) 2 mg/mL suspension 40 mg  40 mg Per Feeding Tube QAM  Walter Dempsey MD   40 mg at 01/28/25 0834    Or    pantoprazole (PROTONIX) IV push injection 40 mg  40 mg Intravenous QAM  Walter Dempsey MD   40 mg at 02/05/25 0903    polyethylene glycol (MIRALAX) Packet 17 g  17 g Oral or NG Tube Daily Walter Dempsey MD   17 g at 02/05/25 0903    senna-docusate (SENOKOT-S/PERICOLACE) 8.6-50 MG per tablet 1 tablet  1 tablet Oral or NG Tube BID Walter Dempsey MD   1 tablet at 02/05/25 0901    sevelamer carbonate (RENVELA) tablet 800 mg  800  mg Oral TID w/meals Walter Dempsey MD   800 mg at 02/05/25 0901    sodium chloride (PF) 0.9% PF flush 3 mL  3 mL Intracatheter Q8H Milo Carrion MD   3 mL at 02/04/25 0638    sodium chloride (PF) 0.9% PF flush 3 mL  3 mL Intracatheter Q8H Walter Dempsey MD   3 mL at 02/03/25 1237      No Known Allergies         Physical Exam:   Vitals were reviewed   , Blood pressure 117/67, pulse 87, temperature 98.8  F (37.1  C), temperature source Oral, resp. rate 20, weight 81.8 kg (180 lb 5.4 oz), SpO2 100%.    Wt Readings from Last 3 Encounters:   02/05/25 81.8 kg (180 lb 5.4 oz)   01/21/25 75.8 kg (167 lb)   01/03/25 78.9 kg (174 lb)         GENERAL APPEARANCE: Frail.      RESP: lungs cta b c good efforts, no crackles, rhonchi or wheezes  CV: RRR  ABDOMEN: soft, NT  EXTREMITIES/SKIN: No edema         Data:     CBC RESULTS:     Recent Labs   Lab 02/05/25  0532 02/04/25  1417 02/04/25  0454 02/03/25  1959/25  0539 02/02/25  0658 02/01/25  0605 01/31/25  0424   WBC 19.3*  --  16.6*  --  15.4* 14.9* 18.2* 14.9*   RBC 2.65*  --  2.80*  --  3.02* 2.91* 2.97* 2.64*   HGB 8.4* 8.6* 8.6* 10.1* 9.3* 9.1* 9.8* 8.1*   HCT 24.7*  --  25.8*  --  28.4* 27.3* 28.0* 25.3*     --  390  --  376 306 319 256       Basic Metabolic Panel:  Recent Labs   Lab 02/05/25  0532 02/05/25  0213 02/04/25  2209 02/04/25  1711 02/04/25  1128 02/04/25  0812 02/04/25  0454 02/03/25  0748 02/03/25  0539 02/02/25  0916 02/02/25  0658 02/01/25  0902 02/01/25  0605 01/31/25  0427 01/31/25 0424   *  --   --   --   --   --  131*  --  130*  --  133*  --  132*  --  132*   POTASSIUM 4.6  --   --   --   --   --  4.3  --  4.3  --  4.0  --  4.4  --  3.8   CHLORIDE 90*  --   --   --   --   --  95*  --  94*  --  96*  --  93*  --  96*   CO2 22  --   --   --   --   --  21*  --  22  --  22  --  22  --  22   BUN 75.6*  --   --   --   --   --  65.3*  --  61.8*  --  57.8*  --  58.5*  --  59.7*   CR 6.55*  --   --   --   --   --  6.20*  --  6.32*  --   6.31*  --  6.53*  --  6.88*   * 102* 137* 108* 153* 190* 238*   < > 207*   < > 158*   < > 285*   < > 228*   TERENCE 9.6  --   --   --   --   --  9.2  --  9.6  --  9.2  --  9.5  --  8.5*    < > = values in this interval not displayed.       INR  Recent Labs   Lab 02/01/25  0605   INR 1.44*      Attestation:   I have reviewed today's relevant vital signs, notes, medications, labs and imaging.    Jennifer Jameson MD  [facility] - Nephrology   [phone]

## 2025-02-05 NOTE — PROGRESS NOTES
"[facility] Nurse Inpatient Assessment     Consulted for: Wound R heel ulcer     Summary: patient with unstageable pressure injuries to right heel and right lateral malleolus, present on admission, stable on follow up 2/5     [facility] nurse follow-up plan: weekly    Patient History (according to provider note(s):      \"64 year old male with past medical history of severe heart failure (EF 30%), ESRD (on peritoneal dialysis), chronic paroxysmal AF on warfarin, LLE PAD s/p endarterectomy, RCC s/p nephrectomy admitted on 1/21/2025 for septic shock secondary to right leg PAD with worsening right heel necrotic ulcer. The patient was seen in ED at outside hospital on 1/3/25 and diagnosed with pneumonia, later completing course of Cefdinir. He then presented on 1/21/25 to outside hospital for right leg pain and found to have right leg ulcer and transferred to [facility] for vascular surgery evaluation. Now s/p right common femoral BK popliteal/tibial endarterectomy and right common femoral to below-knee popliteal/tibial PTFE bypass performed on 1/27/25. The patient was admitted to the ICU for requirement of mechanical ventilation and pressor support following surgery for multifactorial shock.       Hospitalist service consulted 1/31/2025 for transfer out of ICU and to assist with medical management along with vascular surgery. Infectious disease also consulted.\"    Assessment:      Areas visualized during today's visit: Focused:, Lower extremities , and Right    Wound location: right heel     Last photo: 2/5  Wound due to: Pressure Injury unstageable community acquired   Wound history/plan of care: per POC   Wound base:  Eschar,      Palpation of the wound bed: firm      Drainage: none     Description of drainage: none     Measurements (length x width x depth, in cm): 8  x 8  x  0 cm      Tunneling: N/A     Undermining: N/A  Periwound skin: Intact      Color: normal and consistent with surrounding " "tissue      Temperature: normal   Odor: none  Pain: no grimacing or signs of discomfort, none  Pain interventions prior to dressing change: patient tolerated well  Treatment goal: Maintain (prevention of deterioration) and Protection  STATUS: stable  Supplies ordered: supplies stored on unit    Wound location: right lateral malleolus     Last photo: 2/5  Wound due to: Pressure Injury unstageable community acquired   Wound history/plan of care: per POC   Wound base:  Eschar,      Palpation of the wound bed: firm      Drainage: none     Description of drainage: none     Measurements (length x width x depth, in cm): 2.5  x 1.5  x  0 cm      Tunneling: N/A     Undermining: N/A  Periwound skin: Intact      Color: normal and consistent with surrounding tissue      Temperature: normal   Odor: none  Pain: no grimacing or signs of discomfort, none  Pain interventions prior to dressing change: patient tolerated well  Treatment goal: Maintain (prevention of deterioration) and Protection  STATUS: stable  Supplies ordered: supplies stored on unit        Treatment Plan:     01/29/25 0600  Wound care  DAILY        Comments: Right heel and lateral malleolus wound(s): Daily  \"Paint\" wounds with betadine and let dry. Cover with dry gauze/ABD and wrap with Kerlix. Secure with Medipore tape. Keep heel off-loaded at all times with Rooke boot.          Orders: Reviewed    RECOMMEND PRIMARY TEAM ORDER: None, at this time  Education provided: plan of care  Discussed plan of care with: Patient and Nurse  Notify WOC if wound(s) deteriorate.  Nursing to notify the Provider(s) and re-consult the WOC Nurse if new skin concern.    DATA:     Current support surface: Standard  Standard gel mattress (Isoflex)  Containment of urine/stool: Incontinence Protocol and Incontinent pad in bed  BMI: Body mass index is 23.79 kg/m .   Active diet order: Orders Placed This Encounter      Combination Diet Minced and Moist Diet (level 5); Thin Liquids (level 0) " (Sit at 90 degrees, alternate textures)     Output: I/O last 3 completed shifts:  In: 200 [P.O.:200]  Out: 1801 [Urine:155; Other:1646]     Labs:   Recent Labs   Lab 02/05/25  0532 02/02/25  0658 02/01/25  0605 01/31/25  1525 01/31/25  0424 01/30/25  1517   ALBUMIN  --   --   --   --   --  1.8*   HGB 8.4*   < > 9.8*  --    < >  --    INR  --   --  1.44*  --   --   --    WBC 19.3*   < > 18.2*  --    < >  --    A1C  --   --   --  6.6*  --   --     < > = values in this interval not displayed.     Pressure injury risk assessment:   Sensory Perception: 3-->slightly limited  Moisture: 3-->occasionally moist  Activity: 2-->chairfast  Mobility: 3-->slightly limited  Nutrition: 2-->probably inadequate  Friction and Shear: 2-->potential problem  Matias Score: 15    Ivis CWOCN   1st choice: Securely message with [employer] ([facility] [employer])   (2nd option: [facility] Office Phone [phone], messages checked periodically Mon-Fri 8a-4p)

## 2025-02-05 NOTE — PROGRESS NOTES
Cycler set up per protocol and per treatment orders     Results from previous treatment:  Effluent color and clarity: Yellow, clear  Total UF: 1646  Initial Drain: 364  Avg Dwell: 1:22  Lost Dwell: 0:01    Cycler Serial Number: [id number]  Total Treatment Volume: 10,000mL  Total treatment time: 9 hrs  Fill Volume: 2000ml  Last Fill Volume:0ml  Heater ba.5% 6000mL;  Lot #: [id number];  Expiration Date:   Side Bag #1: 2.5% 6000mL;  Lot #: [id number];  Expiration Date:     Number of cycles including final fill: 5  Dwell Time: 1:22 minutes  Last Fill Volume: 0ml  Initial Drain Alarm: 0ml  PD orders reviewed with Patient procedure and ESRD teaching done and questions answered.  Cycler ready for hook-up.    Report given to: Charge nurse 3rd floor

## 2025-02-06 ENCOUNTER — APPOINTMENT (OUTPATIENT)
Dept: SPEECH THERAPY | Facility: CLINIC | Age: 65
DRG: 853 | End: 2025-02-06
Attending: INTERNAL MEDICINE
Payer: MEDICARE

## 2025-02-06 ENCOUNTER — APPOINTMENT (OUTPATIENT)
Dept: OCCUPATIONAL THERAPY | Facility: CLINIC | Age: 65
End: 2025-02-06
Attending: INTERNAL MEDICINE
Payer: MEDICARE

## 2025-02-06 VITALS
HEART RATE: 102 BPM | DIASTOLIC BLOOD PRESSURE: 78 MMHG | OXYGEN SATURATION: 100 % | RESPIRATION RATE: 12 BRPM | WEIGHT: 181.22 LBS | SYSTOLIC BLOOD PRESSURE: 127 MMHG | BODY MASS INDEX: 23.91 KG/M2 | TEMPERATURE: 97.7 F

## 2025-02-06 LAB
ANION GAP SERPL CALCULATED.3IONS-SCNC: 14 MMOL/L (ref 7–15)
BACTERIA BLD CULT: NO GROWTH
BACTERIA BLD CULT: NO GROWTH
BACTERIA BLD CULT: NORMAL
BACTERIA BLD CULT: NORMAL
BACTERIA PLR CULT: NORMAL
BUN SERPL-MCNC: 71 MG/DL (ref 8–23)
CALCIUM SERPL-MCNC: 9.3 MG/DL (ref 8.8–10.4)
CHLORIDE SERPL-SCNC: 93 MMOL/L (ref 98–107)
CREAT SERPL-MCNC: 6.03 MG/DL (ref 0.67–1.17)
EGFRCR SERPLBLD CKD-EPI 2021: 10 ML/MIN/1.73M2
ERYTHROCYTE [DISTWIDTH] IN BLOOD BY AUTOMATED COUNT: 19.5 % (ref 10–15)
GLUCOSE BLDC GLUCOMTR-MCNC: 151 MG/DL (ref 70–99)
GLUCOSE BLDC GLUCOMTR-MCNC: 167 MG/DL (ref 70–99)
GLUCOSE BLDC GLUCOMTR-MCNC: 174 MG/DL (ref 70–99)
GLUCOSE BLDC GLUCOMTR-MCNC: 225 MG/DL (ref 70–99)
GLUCOSE BLDC GLUCOMTR-MCNC: 87 MG/DL (ref 70–99)
GLUCOSE SERPL-MCNC: 202 MG/DL (ref 70–99)
GRAM STAIN RESULT: NORMAL
GRAM STAIN RESULT: NORMAL
HCO3 SERPL-SCNC: 24 MMOL/L (ref 22–29)
HCT VFR BLD AUTO: 23.2 % (ref 40–53)
HGB BLD-MCNC: 7.5 G/DL (ref 13.3–17.7)
HGB BLD-MCNC: 7.6 G/DL (ref 13.3–17.7)
LACTATE SERPL-SCNC: 0.9 MMOL/L (ref 0.7–2)
MAGNESIUM SERPL-MCNC: 2 MG/DL (ref 1.7–2.3)
MCH RBC QN AUTO: 31.1 PG (ref 26.5–33)
MCHC RBC AUTO-ENTMCNC: 32.8 G/DL (ref 31.5–36.5)
MCV RBC AUTO: 95 FL (ref 78–100)
PHOSPHATE SERPL-MCNC: 5.2 MG/DL (ref 2.5–4.5)
PLATELET # BLD AUTO: 472 10E3/UL (ref 150–450)
POTASSIUM SERPL-SCNC: 4.4 MMOL/L (ref 3.4–5.3)
RBC # BLD AUTO: 2.44 10E6/UL (ref 4.4–5.9)
SODIUM SERPL-SCNC: 131 MMOL/L (ref 135–145)
UFH PPP CHRO-ACNC: 0.33 IU/ML
WBC # BLD AUTO: 18.6 10E3/UL (ref 4–11)

## 2025-02-06 PROCEDURE — 250N000011 HC RX IP 250 OP 636: Performed by: INTERNAL MEDICINE

## 2025-02-06 PROCEDURE — 250N000013 HC RX MED GY IP 250 OP 250 PS 637: Performed by: HOSPITALIST

## 2025-02-06 PROCEDURE — 85018 HEMOGLOBIN: CPT | Performed by: INTERNAL MEDICINE

## 2025-02-06 PROCEDURE — 250N000013 HC RX MED GY IP 250 OP 250 PS 637: Performed by: INTERNAL MEDICINE

## 2025-02-06 PROCEDURE — 82310 ASSAY OF CALCIUM: CPT | Performed by: INTERNAL MEDICINE

## 2025-02-06 PROCEDURE — 80048 BASIC METABOLIC PNL TOTAL CA: CPT | Performed by: INTERNAL MEDICINE

## 2025-02-06 PROCEDURE — 97530 THERAPEUTIC ACTIVITIES: CPT | Mod: GO | Performed by: OCCUPATIONAL THERAPIST

## 2025-02-06 PROCEDURE — 36415 COLL VENOUS BLD VENIPUNCTURE: CPT | Performed by: HOSPITALIST

## 2025-02-06 PROCEDURE — 85520 HEPARIN ASSAY: CPT | Performed by: HOSPITALIST

## 2025-02-06 PROCEDURE — 92526 ORAL FUNCTION THERAPY: CPT | Mod: GN

## 2025-02-06 PROCEDURE — 99232 SBSQ HOSP IP/OBS MODERATE 35: CPT | Performed by: PHYSICIAN ASSISTANT

## 2025-02-06 PROCEDURE — 82374 ASSAY BLOOD CARBON DIOXIDE: CPT | Performed by: INTERNAL MEDICINE

## 2025-02-06 PROCEDURE — 84100 ASSAY OF PHOSPHORUS: CPT | Performed by: INTERNAL MEDICINE

## 2025-02-06 PROCEDURE — 83735 ASSAY OF MAGNESIUM: CPT | Performed by: INTERNAL MEDICINE

## 2025-02-06 PROCEDURE — 85048 AUTOMATED LEUKOCYTE COUNT: CPT | Performed by: INTERNAL MEDICINE

## 2025-02-06 PROCEDURE — 36415 COLL VENOUS BLD VENIPUNCTURE: CPT | Performed by: INTERNAL MEDICINE

## 2025-02-06 PROCEDURE — 99232 SBSQ HOSP IP/OBS MODERATE 35: CPT | Performed by: INTERNAL MEDICINE

## 2025-02-06 PROCEDURE — 99233 SBSQ HOSP IP/OBS HIGH 50: CPT | Performed by: HOSPITALIST

## 2025-02-06 PROCEDURE — 85018 HEMOGLOBIN: CPT | Performed by: HOSPITALIST

## 2025-02-06 PROCEDURE — 83605 ASSAY OF LACTIC ACID: CPT | Performed by: HOSPITALIST

## 2025-02-06 PROCEDURE — 120N000013 HC R&B IMCU

## 2025-02-06 PROCEDURE — 90945 DIALYSIS ONE EVALUATION: CPT

## 2025-02-06 PROCEDURE — 97535 SELF CARE MNGMENT TRAINING: CPT | Mod: GO | Performed by: OCCUPATIONAL THERAPIST

## 2025-02-06 RX ORDER — GENTAMICIN SULFATE 1 MG/G
CREAM TOPICAL DAILY PRN
Status: DISCONTINUED | OUTPATIENT
Start: 2025-02-06 | End: 2025-02-06

## 2025-02-06 RX ADMIN — Medication 5 MG: at 20:02

## 2025-02-06 RX ADMIN — METHOCARBAMOL 500 MG: 500 TABLET ORAL at 20:03

## 2025-02-06 RX ADMIN — MIDODRINE HYDROCHLORIDE 10 MG: 2.5 TABLET ORAL at 18:54

## 2025-02-06 RX ADMIN — ATORVASTATIN CALCIUM 40 MG: 40 TABLET, FILM COATED ORAL at 20:03

## 2025-02-06 RX ADMIN — POLYETHYLENE GLYCOL 3350 17 G: 17 POWDER, FOR SOLUTION ORAL at 08:39

## 2025-02-06 RX ADMIN — SEVELAMER CARBONATE 800 MG: 800 TABLET, FILM COATED ORAL at 08:39

## 2025-02-06 RX ADMIN — ACETAMINOPHEN 975 MG: 325 TABLET, FILM COATED ORAL at 03:28

## 2025-02-06 RX ADMIN — ACETAMINOPHEN 975 MG: 325 TABLET, FILM COATED ORAL at 20:02

## 2025-02-06 RX ADMIN — HEPARIN SODIUM 1650 UNITS/HR: 10000 INJECTION, SOLUTION INTRAVENOUS at 00:24

## 2025-02-06 RX ADMIN — CALCITRIOL CAPSULES 0.25 MCG 0.25 MCG: 0.25 CAPSULE ORAL at 20:03

## 2025-02-06 RX ADMIN — CLOPIDOGREL BISULFATE 75 MG: 75 TABLET ORAL at 20:03

## 2025-02-06 RX ADMIN — METHOCARBAMOL 500 MG: 500 TABLET ORAL at 12:42

## 2025-02-06 RX ADMIN — MIDODRINE HYDROCHLORIDE 10 MG: 2.5 TABLET ORAL at 08:38

## 2025-02-06 RX ADMIN — Medication 12.5 MG: at 08:39

## 2025-02-06 RX ADMIN — ACETAMINOPHEN 975 MG: 325 TABLET, FILM COATED ORAL at 11:42

## 2025-02-06 RX ADMIN — Medication 40 MG: at 09:54

## 2025-02-06 RX ADMIN — SENNOSIDES AND DOCUSATE SODIUM 1 TABLET: 50; 8.6 TABLET ORAL at 08:38

## 2025-02-06 RX ADMIN — Medication 1 CAPSULE: at 08:38

## 2025-02-06 RX ADMIN — HEPARIN SODIUM 1650 UNITS/HR: 10000 INJECTION, SOLUTION INTRAVENOUS at 16:00

## 2025-02-06 ASSESSMENT — ACTIVITIES OF DAILY LIVING (ADL)
ADLS_ACUITY_SCORE: 65
ADLS_ACUITY_SCORE: 70
ADLS_ACUITY_SCORE: 65
ADLS_ACUITY_SCORE: 70
ADLS_ACUITY_SCORE: 64
ADLS_ACUITY_SCORE: 70
ADLS_ACUITY_SCORE: 65
ADLS_ACUITY_SCORE: 70
ADLS_ACUITY_SCORE: 64
ADLS_ACUITY_SCORE: 65
ADLS_ACUITY_SCORE: 65
ADLS_ACUITY_SCORE: 70

## 2025-02-06 NOTE — PROGRESS NOTES
[facility]    Infectious Disease Progress Note    Date of Service (when I saw the patient): 02/06/2025     Assessment & Plan   Arnulfo Pritchett is a 64 year old male who was admitted on 1/21/2025.     Impression:  65 yo with severe heart failure (EF 30%), ESRD (on peritoneal dialysis), chronic paroxysmal AF on warfarin, LLE PAD s/p endarterectomy, RCC s/p nephrectomy  Admitted on 1/21/2025 for septic shock secondary to right leg PAD with worsening right heel necrotic ulcer.   Now s/p right common femoral BK popliteal/tibial endarterectomy and right common femoral to below-knee popliteal/tibial PTFE bypass performed on 1/27/25.   Reports of worsening cough, shortness of breath with imaging with layering of the pleural effusion - s/p thoracentesis with cultures negative   MRSA nares positive   Eschar on the right heel and dry gangrene without concern for active cellulitis, no evidence of osteo on the MRI   Leukocytosis, hard to say if indeed an indicator of worsening infection in a patient with multiple comorbidities and renal failure  Mildly elevated proCal of unclear significance in end-stage renal disease patient's  Already finished Finished 12 days of zosyn   New concern per vascular for ischemic changes to the second and the third toe. Evaluated by Podiatry with no clear signs of infection in foot.   Afebrile. Ongoing leukocytosis may be due to thigh hematoma, slightly improved today.      Recommendations  No clear evidence of infection presently. Blood cultures have been negative. Pleural cultures are no growth. Hematoma in groin can be source of ongoing leukocytosis. He is having some tachycardia, but appears he has had it throughout hospital stay, even while on Zosyn.   Follow repeat blood cultures, so far no growth.   He just finished over 12 days of Zosyn. Maintain off antibiotics for now and observe.   ID will sign off. Please call us back with questions.     Patient and plan  discussed with Dr. Alisa Neal PA-C    Interval History   More alert today Complains of pain in foot. Otherwise, no other pain, fever. Some SOB, which is chronic and not new.          Physical Exam   Temp: 97.6  F (36.4  C) Temp src: Oral BP: 111/80 Pulse: 93   Resp: 12 SpO2: 93 % O2 Device: None (Room air) Oxygen Delivery: 1 LPM  Vitals:    02/04/25 0242 02/05/25 0554 02/06/25 0100   Weight: 76.7 kg (169 lb 1.5 oz) 81.8 kg (180 lb 5.4 oz) 82.2 kg (181 lb 3.5 oz)     Vital Signs with Ranges  Temp:  [97.6  F (36.4  C)-98.2  F (36.8  C)] 97.6  F (36.4  C)  Pulse:  [] 93  Resp:  [7-22] 12  BP: ()/() 111/80  SpO2:  [91 %-99 %] 93 %    Constitutional: Awake, alert, cooperative, no apparent distress  Lungs: Clear to auscultation bilaterally, no crackles or wheezing  Cardiovascular: Tachycardic, normal S1 and S2, and no murmur noted  Abdomen: Normal bowel sounds, soft, non-distended, non-tender  Skin: Some edema about RLE with noted ischemic right 2nd and 3rd toes. Noted residual right groin hematoma.   Other:    Medications   Current Facility-Administered Medications   Medication Dose Route Frequency Provider Last Rate Last Admin    dextrose 10% infusion   Intravenous Continuous PRN Walter Dempsey MD        dianeal PD LOW calcium-2.5% dex (calcium 2.5 mEq/L) 6,000 mL PERITONEAL DIALYSATE   Dialysis [facility] Supplied PD Jennifer Jameson MD        dianeal PD LOW calcium-2.5% dex (calcium 2.5 mEq/L) 6,000 mL PERITONEAL DIALYSATE   Dialysis [facility] Supplied PD Jennifer Jameson MD   New Bag at 02/05/25 2032    dianeal PD LOW calcium-4.25% dex (calcium 2.5 mEq/L) 6,000 mL PERITONEAL DIALYSATE   Dialysis [facility] Supplied PD Jennifer Jameson MD        heparin 25,000 units in 0.45% NaCl 250 mL ANTICOAGULANT infusion  0-5,000 Units/hr Intravenous Continuous Walter Dempsey MD 16.5 mL/hr at 02/06/25 0024 1,650 Units/hr at 02/06/25 0024    Patient is already receiving anticoagulation with heparin, enoxaparin  (LOVENOX), warfarin (COUMADIN)  or other anticoagulant medication   Does not apply Continuous PRN Walter Dempsey MD         Current Facility-Administered Medications   Medication Dose Route Frequency Provider Last Rate Last Admin    - MEDICATION INSTRUCTIONS for Dialysis Patients -   Does not apply See Admin Instructions Walter Dempsey MD        acetaminophen (TYLENOL) tablet 975 mg  975 mg Oral or Feeding Tube Q8H Walter Dempsey MD   975 mg at 02/06/25 1142    Or    acetaminophen (TYLENOL) Suppository 650 mg  650 mg Rectal Q8H Walter Dempsey MD        [Held by provider] allopurinol (ZYLOPRIM) tablet 200 mg  200 mg Oral QPM Walter Dempsey MD   200 mg at 01/26/25 1957    atorvastatin (LIPITOR) tablet 40 mg  40 mg Oral or Feeding Tube At Bedtime Walter Dempsey MD   40 mg at 02/05/25 2026    calcitRIOL (ROCALTROL) capsule 0.25 mcg  0.25 mcg Oral At Bedtime Walter Dempsey MD   0.25 mcg at 02/05/25 2026    cinacalcet (SENSIPAR) tablet 60 mg  60 mg Oral Q Mon Wed Fri AM Walter Dempsey MD   60 mg at 02/05/25 2026    clopidogrel (PLAVIX) tablet 75 mg  75 mg Oral or Feeding Tube QPM Walter Dempsey MD   75 mg at 02/05/25 2026    insulin aspart (NovoLOG) injection (RAPID ACTING)   Subcutaneous TID w/meals Walter Dempsey MD   3 Units at 02/04/25 1211    insulin aspart (NovoLOG) injection (RAPID ACTING)  1-7 Units Subcutaneous TID AC Walter Dempsey MD   1 Units at 02/06/25 1147    insulin aspart (NovoLOG) injection (RAPID ACTING)  1-5 Units Subcutaneous At Bedtime Walter Dempsey MD        insulin glargine (LANTUS PEN) injection 15 Units  15 Units Subcutaneous Daily Walter Dempsey MD   15 Units at 02/06/25 0840    melatonin tablet 5 mg  5 mg Oral At Bedtime Walter Dempsey MD   5 mg at 02/05/25 2026    metoprolol succinate ER (TOPROL-XL) 24 hr half-tab 12.5 mg  12.5 mg Oral Daily Larry Askew MD   12.5 mg at 02/06/25 0839    midodrine (PROAMATINE) tablet 10 mg  10 mg  Oral BID w/meals Walter Dempsey MD   10 mg at 02/06/25 0838    multivitamin RENAL (TRIPHROCAPS) capsule 1 capsule  1 capsule Oral Daily Walter Dempsey MD   1 capsule at 02/06/25 0838    pantoprazole (PROTONIX) 2 mg/mL suspension 40 mg  40 mg Per Feeding Tube [facility] Walter Dempsey MD   40 mg at 02/06/25 0954    Or    pantoprazole (PROTONIX) IV push injection 40 mg  40 mg Intravenous [facility] Walter Dempsey MD   40 mg at 02/05/25 0903    polyethylene glycol (MIRALAX) Packet 17 g  17 g Oral or NG Tube Daily Walter Dempsey MD   17 g at 02/06/25 0839    senna-docusate (SENOKOT-S/PERICOLACE) 8.6-50 MG per tablet 1 tablet  1 tablet Oral or NG Tube BID Walter Dempsey MD   1 tablet at 02/06/25 0838    sevelamer carbonate (RENVELA) tablet 800 mg  800 mg Oral TID w/meals Walter Dempsey MD   800 mg at 02/06/25 0839    sodium chloride (PF) 0.9% PF flush 3 mL  3 mL Intracatheter Q8H Walter Dempsey MD   3 mL at 02/03/25 1237       Data   All microbiology laboratory data reviewed.  Recent Labs   Lab Test 02/06/25  1309 02/06/25  0558 02/05/25  0532 02/04/25  1417 02/04/25  0454   WBC  --  18.6* 19.3*  --  16.6*   HGB 7.5* 7.6* 8.4*   < > 8.6*   HCT  --  23.2* 24.7*  --  25.8*   MCV  --  95 93  --  92   PLT  --  472* 437  --  390    < > = values in this interval not displayed.     Recent Labs   Lab Test 02/06/25  0558 02/05/25  0532 02/04/25  0454   CR 6.03* 6.55* 6.20*     Recent Labs   Lab Test 01/21/25  1606   SED 86*        02/05/2025 1650 02/06/2025 0631 Blood Culture Hand, Right [[id number]]   Blood from Hand, Right    Preliminary result Component Value   Culture No growth after 12 hours P             02/05/2025 1638 02/06/2025 0631 Blood Culture Hand, Left [[id number]]   Blood from Hand, Left    Preliminary result Component Value   Culture No growth after 12 hours P          02/02/2025 1150 02/03/2025 1341 Cell count with differential fluid [[id number]]    (Abnormal)   Pleural fluid from  Pleural Cavity, Right    Final result Component Value   No component results          02/02/2025 1150 02/05/2025 0738 Pleural fluid Aerobic Bacterial Culture Routine With Gram Stain [[id number]]   Pleural fluid from Pleural Cavity, Right    Preliminary result Component Value   Culture No growth after 2 days P   Gram Stain Result No organisms seen P    4+ WBC seen P    Predominantly PMNs             02/02/2025 1150 02/02/2025 1240 Glucose fluid [[id number]]    Pleural fluid from Pleural Cavity, Right    Final result Component Value Units   Glucose Fluid Source Pleural Cavity, Right    Glucose fluid 133 mg/dL          02/02/2025 1150 02/02/2025 1240 Lactate dehydrogenase fluid [[id number]]    Pleural fluid from Pleural Cavity, Right    Final result Component Value Units   LD Fluid Source Pleural Cavity, Right    Lactate dehydrogenase fluid 286 U/L          02/02/2025 1150 02/02/2025 1240 Protein fluid [[id number]]    Pleural fluid from Pleural Cavity, Right    Final result Component Value Units   Protein Fluid Source Pleural Cavity, Right    Protein Total Fluid 1.8 g/dL          02/02/2025 1150 02/04/2025 1509 Cytology, non-gynecologic [[id number]]   Pleural fluid from Pleural Cavity, Right    Final result Component Value   Final Diagnosis A.  Right pleural fluid, thoracentesis:     Interpretation:     Negative for malignancy     Other Findings:     Acute and chronic inflammation present.     Adequacy:     Satisfactory for evaluation       Gross Description A(A). Pleural Cavity, Right, Pleural Fluid:  Received 85 ml of hazy, orange fluid, processed as 1 Pap stained Autocyte,  1 Burks stained cytospin and one hematoxylin and eosin stained cell block.     Microscopic Description Microscopic examination was performed.  Results are incorporated into the final diagnosis.   Performing Labs The technical component of this testing was completed at [facility] and [facility].    Stain controls for all stains resulted within this report have been reviewed and show appropriate reactivity.          02/02/2025 1150 02/03/2025 1341 Cell Count Body Fluid [[id number]]    (Abnormal)   Pleural fluid from Pleural Cavity, Right    Final result Component Value Units   Color Pink Abnormal     Clarity Hazy Abnormal     Cell Count Fluid Source Pleural Cavity, Right    Total Nucleated Cells 1,078 /uL          02/02/2025 1150 02/03/2025 1341 Differential Body Fluid [[id number]]    Pleural fluid from Pleural Cavity, Right    Final result Component Value Units   % Neutrophils 14 %   % Lymphocytes 5 %   % Monocyte/Macrophages 65 %   % Lining Cells 16 %   Pathologist Review Comments (Body Fluid Diff) Reactive mesothelial cells and macrophages.  Negative for malignancy. Please also correlate with concurrent formal cytology ([id number])           02/01/2025 1206 02/04/2025 1631 Blood Culture Hand, Right [[id number]]    Blood from Hand, Right    Preliminary result Component Value   Culture No growth after 3 days P             02/01/2025 1200 02/04/2025 1631 Blood Culture Hand, Left [[id number]]   Blood from Hand, Left    Preliminary result Component Value   Culture No growth after 3 days P

## 2025-02-06 NOTE — PROVIDER NOTIFICATION
MD Notification    Notified Person: MD    Notified Person Name: Enu    Notification Date/Time: 1600 2/5/25    Notification Interaction: vocera    Purpose of Notification: patient feels more short of breath than normally. With his WBC going up do you want to do a portable chest xray?    Orders Received: portable chest xray ordered    Comments:

## 2025-02-06 NOTE — PROGRESS NOTES
[facility]    Medicine Progress Note - Hospitalist Service    Date of Admission:  1/21/2025    Assessment & Plan   Arnulfo Pritchett is a 64 year old male with past medical history of severe heart failure (EF 30%), ESRD (on peritoneal dialysis), chronic paroxysmal AF on warfarin, LLE PAD s/p endarterectomy, RCC s/p nephrectomy admitted on 1/21/2025 for septic shock secondary to right leg PAD with worsening right heel necrotic ulcer. The patient was seen in ED at outside hospital on 1/3/25 and diagnosed with pneumonia, later completing course of Cefdinir. He then presented on 1/21/25 to outside hospital for right leg pain and found to have right leg ulcer and transferred to [facility] for vascular surgery evaluation. Now s/p right common femoral BK popliteal/tibial endarterectomy and right common femoral to below-knee popliteal/tibial PTFE bypass performed on 1/27/25. The patient was admitted to the ICU for requirement of mechanical ventilation and pressor support following surgery for multifactorial shock.      Hospitalist service consulted 1/31/2025 for transfer out of ICU and to assist with medical management along with vascular surgery. Infectious disease also consulted.     Hx PAD of LLE s/p endarterectomy and fem-tibioperoneal trunk bypass on 10/25/24  Hx RLE arterial occlusion s/p SFA angioplasty and stent 5/2024  PAD RLE, right heel gangrene, occluded SFA, no evidence of osteomyelitis  s/p right common femoral and popliteal/tibial endarterectomy, right femoral to popliteal/tibial PTFE bypass 1/27/25   -Found to have critical limb ischemia on admission on January 21, for details see admission note.   -Distributive shock following surgery followed by the ICU service until 1/31.  -Followed by vascular surgery, podiatry, wound care, and ID.  -Coumadin has been held since admission, recently on IV heparin, vascular surgery/podiatry to review when to restart warfarin pending any potential  surgical intervention for worsening ischemic changes in toes of right foot as noted below.  -Has been maintained on statin and Plavix 75 mg daily during this hospital stay.  -Recent IV piperacillin/tazobactam (Zosyn), discontinued 2/2 per ID; follow-up cultures, monitor off antibiotics.  -Any future plans for surgical debridement as per vascular surgery and podiatry.  -Wound care per surgery and WOC.  -Pain control, see hospital orders.  -Worsening ischemic changes in 2nd-5th toes of right foot.  Podiatry and vascular surgery following.       Multifactorial shock, resolved  ICU admission, weaned off vasopressors 1/30.  -Transitioned to midodrine with increase in prior to admission dose on1/31.  -Monitor blood pressure and heart rate.  -Antibiotics as above.     End-stage renal disease (ESRD), on peritoneal dialysis  Hx of secondary hyperparathyroidism and hyperphosphatemia, phosphorous elevated above baseline of 6 at most recent of 7.9   Nephrology consulted, ongoing follow-up.  Did not require hemodialysis this hospital stay.  -Ongoing peritoneal dialysis, as per nephrology.  -Continue midodrine.     Acute hypoxemic respiratory failure  Right pleural effusion  Status post thoracentesis 1/24/25, repeat 2/2/25  -Following surgery on admission by vascular surgery was intubated.  Recent pneumonia treated with 3 weeks of Ceftin prior to this admission.  No concern for pneumonia per intensivist service.  -Extubated 1/29/25.  -Right pleural effusion.  Status post thoracentesis1/24/25 with 1.5 L clear yellow fluid removed, likely transudative based on low cell count of 117, , and protein of 1.7. Possibly secondary to reduced peritoneal dialysis during hospitalization vs underlying severe HFrEF (30%).  -CT chest 2/1/25, see report, no clear pneumonia; large right pleural effusion noted.  -Wean down oxygen as able, encourage incentive spirometry.  -Antibiotics as above, discontinued by ID 2/2.  -ID consulted 2/1 as  above, ongoing follow-up.  -S/p repeat Right thoracentesis on 2/2 with 2.3 L of rolan fluid removed; additional studies, cultures (negative to date), cytology (negative for malignancy)     Paroxysmal atrial fibrillation  Chronic anticoagulation prior to admission, warfarin - ON HOLD  Ischemic cardiomyopathy with HLD, CAD s/p multiple stents, and severe EFrEF (30%)   Wide complex tachycardia 2/1/25  Assessment-postoperatively has been maintained on heparin drip.  Coumadin has been held during his hospital stay.  -Echo 1/31-EF 25 to 30%.  Severe global hypokinesis with abnormal septal motion consistent with conduction abnormality.  Severe inferior wall hypokinesis.  LVH.  RV mildly dilated and mildly reduced RV function.  Mild mitral regurgitation.  Mild to moderate mitral stenosis.  Moderate aortic stenosis.  Left ventricular fairly pressures elevated.  -Episode of wide-complex tachycardia noted by nursing staff, 15 beats, up on 2/1/2025.  -Continue inpatient telemetry.  -Consider follow-up cardiology consultation if recurrent wide-complex tachycardia in the setting of left ventricular dysfunction.  -Restarted prior to admission beta-blocker, Toprol XL, at adjusted dose, monitoring heart rate and blood pressure and consider titrating back up to PTA dose as indicated.  -Continue IV heparin anticoagulation, with future transition to oral Coumadin as prior to admission when appropriate from vascular surgery/podiatry standpoint.     Moderate protein calorie nutrition  -Nutrition consulted.  -Speech and language therapy (SLP) consulted, diet per speech therapy.     Hyperglycemia  Type 2 diabetes   hemoglobin A1c 5.7% October 24  On insulin prior to admission.  - increase lantus from 15U to 20U on 2/6/25 for optimal   -Continue insulin, adjust as needed.  -Monitor for hypoglycemia.  - continue to monitor BG    Mixed anion gap acidosis  -Improved respiratory function, monitor.  Anion gap resolved 1/31.     Abdominal  discomfort 1/30, resolved  Noted to have some right upper quadrant pain 1/30.  Now resolved.  LFTs normal.  Was on Zosyn for above surgical issues.   -Monitor abdominal exam.  -Antibiotics managed as noted above.     Anemia of chronic disease  Baseline around 10.  Has been stable in the 8 range.  No signs of bleeding.  -Hemoglobin fairly stable around 9, slightly lower at 8.6 on 2/4/25, plan to for recheck in afternoon on 2/4/25.    Gout  -PTA allopurinol currently on hold, reassess daily.     History of renal cell carcinoma   -Status post right nephrectomy, monitor.     History of obstructive sleep apnea.  By report, intolerant of CPAP   -Monitor, follow-up with outpatient provider.    Weakness and deconditioning  -PT, OT, speech and language therapy (SLP) consulted.  -Increase activity as tolerated.    Disposition  Anticipated discharge timing see Disposition Plan below.  -Anticipated discharge to transitional care unit.  -Social work consulted for discharge planning.          Diet: Snacks/Supplements Adult: Expedite Cup; Between Meals  Snacks/Supplements Adult: Nepro Oral Supplement; Between Meals  Combination Diet Minced and Moist Diet (level 5); Thin Liquids (level 0) (Sit at 90 degrees, alternate textures)    DVT Prophylaxis: IV heparin  Rosario Catheter: Not present  Lines: None     Cardiac Monitoring: ACTIVE order. Indication: Tachyarrhythmias, acute (48 hours)  Code Status: Full Code      Clinically Significant Risk Factors   { TIP  Diagnoses will continue to appear throughout the admission.  :[id number]}      # Hyponatremia: Lowest Na = 129 mmol/L in last 2 days, will monitor as appropriate  # Hypochloremia: Lowest Cl = 90 mmol/L in last 2 days, will monitor as appropriate      # Hypoalbuminemia: Lowest albumin = 1.8 g/dL at 1/30/2025  3:17 PM, will monitor as appropriate     # Hypertension: Noted on problem list    # Chronic heart failure with reduced ejection fraction: last echo with EF <40%          # DMII:  A1C = 6.6 % (Ref range: <5.7 %) within past 6 months    # Severe Malnutrition: based on nutrition assessment      # Financial/Environmental Concerns:           Social Drivers of Health    Tobacco Use: Medium Risk (1/27/2025)    Patient History     Smoking Tobacco Use: Former     Smokeless Tobacco Use: Never   Alcohol Use: Unknown (11/28/2018)    Received from  and [facility],  and [facility]    AUDIT-C     Frequency of Alcohol Consumption: Monthly or less     Frequency of Binge Drinking: Never   Transportation Needs: High Risk (1/21/2025)    Transportation Needs     Within the past 12 months, has lack of transportation kept you from medical appointments, getting your medicines, non-medical meetings or appointments, work, or from getting things that you need?: Yes   Physical Activity: Unknown (10/8/2024)    Exercise Vital Sign     Days of Exercise per Week: 0 days   Social Connections: Unknown (10/8/2024)    Social Connection and Isolation Panel [NHANES]     Frequency of Social Gatherings with Friends and Family: Patient declined          Disposition Plan   {TIP  The patient's Medical Readiness for Discharge [MRD] has been documented today or is 'Ready Now'. Last Documentation- Anticipated in 2-4 Days   Use SmartList below if a change is needed.  :[id number]}  Medically Ready for Discharge: Anticipated in 2-4 Days, unclear pending vascular surgery/podiatry recommendations regarding worsening ischemic changes of toes in right foot; transition off IV heparin back to warfarin; TCU bed availability.         Heidi Chang MD  Hospitalist Service  [facility]  Securely message with [employer] (more info)  Text page via [employer] Paging/Directory   ______________________________________________________________________    Interval History   Patient laying in bed, appears more lethargic today than previous days as per nursing staff. He was able to  respond to verbal stimuli and follow some commands. He notes worse generalized weakness. WBC appears to be rising daily. Will reconsult ID for reeval.     Physical Exam   Vital Signs: Temp: 97.6  F (36.4  C) Temp src: Oral BP: 117/84 Pulse: 90   Resp: 22 SpO2: 98 % O2 Device: None (Room air) Oxygen Delivery: 1 LPM  Weight: 181 lbs 3.49 oz    General Appearance: Well appearing for stated age.  Respiratory: CTAB, no rales or ronchi  Cardiovascular: S1, S2 normal, no murmurs  GI: non-tender on palpation, BS present      Medical Decision Making   { TIP   MDM Calculator    MDM grid (w/ times)    Coding Support Chat  Billing is now based on time OR medical decision making complexity. Medical decision making included in your A&P does NOT need to be re-documented here.    :[id number]}    40 MINUTES SPENT BY ME on the date of service doing chart review, history, exam, documentation & further activities per the note.      Data     I have personally reviewed the following data over the past 24 hrs:    18.6 (H)  \   7.6 (L)   / 472 (H)     131 (L) 93 (L) 71.0 (H) /  151 (H)   4.4 24 6.03 (H) \     Procal: N/A CRP: N/A Lactic Acid: 0.9         Imaging results reviewed over the past 24 hrs:   Recent Results (from the past 24 hours)   XR Chest Port 1 View    Narrative    EXAM: XR CHEST PORT 1 VIEW  LOCATION: [facility]  DATE: 2/5/2025    INDICATION: Shortness of Breath  COMPARISON: Chest x-ray 2/3/2025 and CT chest 2/1/2025, recent thoracentesis to 2/20/2025 with 2.3 L removed..      Impression    IMPRESSION: Small right pleural effusion layering posteriorly extending into the minor fissure less prominent when compared to recent CT. No pneumothorax. Mild atelectasis. Generalized cardiac enlargement. Lungs otherwise clear.

## 2025-02-06 NOTE — PLAN OF CARE
Vital signs stable on 2L nasal cannula. Alert and oriented with moments of confusion. Lung sounds diminished. Bowel sounds active, flatus present. Lower extremity wound dressing clean dry and intact. Patient denies pain. Up with lift. Tolerating minced moist diet. CMS/neuros at baseline with doppler pulses in lower extremities. Voiding adequately to urinal. Tele sinus rhythm. Heparin gtt continued. PD stopped at 0600 with dressing changed.

## 2025-02-06 NOTE — PROGRESS NOTES
Cycler set up per protocol and per treatment orders     Results from previous treatment:  Effluent color and clarity: yellow, clear  Total UF: 705  Initial Drain: 450  Avg Dwell: 1:19  Lost Dwell: 0:17    Cycler Serial Number: [id number]  Total Treatment Volume: 10,000mL  Total treatment time: 9 hrs  Fill Volume: 2000ml  Last Fill Volume:0ml  Heater ba.25% 6000mL;  Lot #: [id number];  Expiration Date:   Side Bag #1: 2.5% 6000mL;  Lot #: [id number];  Expiration Date:     Number of cycles including final fill: 5  Dwell Time: 1:22 minutes  Last Fill Volume: 0ml  Initial Drain Alarm: 0ml  PD orders reviewed with Patient procedure and ESRD teaching done and questions answered.  Cycler ready for hook-up.    Report given to: Charge nurse 3rd floor

## 2025-02-06 NOTE — PROVIDER NOTIFICATION
MD Notification    Notified Person: MD    Notified Person Name: Enu    Notification Date/Time: 2/5/25 1000    Notification Interaction: vocera    Purpose of Notification: Hello, sepsis fired. Ordered it and lactic acid came back 1.2. He is more lethargic this AM but he was up for most of the night last night. He does a left facial droop but he tells me that he does has bell palsy. And he was started on 2L NC instead of his 0.5L, but no increase work of breathing.    Orders Received: MD will assess pt    Comments:

## 2025-02-06 NOTE — PLAN OF CARE
"Patient Name: Sherman  MRN: [id number]  Date of Admission: 1/21/2025  Reason for Admission: cellulitis  Level of Care: [facility]    Vitals:   BP Readings from Last 1 Encounters:  02/05/25 : 114/72    Pulse Readings from Last 1 Encounters:  02/05/25 : 90    Wt Readings from Last 1 Encounters:  02/05/25 : 81.8 kg (180 lb 5.4 oz)    Ht Readings from Last 1 Encounters:  01/21/25 : 1.854 m (6' 1\")    Estimated body mass index is 23.79 kg/m  as calculated from the following:    Height as of an earlier encounter on 1/21/25: 1.854 m (6' 1\").    Weight as of this encounter: 81.8 kg (180 lb 5.4 oz).  Temp Readings from Last 1 Encounters:  02/05/25 : 97.8  F (36.6  C) (Axillary)    Pain: denies today    CV Surgery Patient: No    Assessment    Resp: shallow breathing, MORTENSEN, RA/ went up to 2L NC today   Telemetry: NSR/sinus tachy  Neuro: intact except for L droop- per pt has hx of bell palsy. Otherwise neuro exam intact. Pulses bilateral DP are +1 and PT are +2  GI/: continent- makes little urine, incontinent of bowel. No BM this shift- BS audible   Skin/Wounds: R heel PI, R middle toes black/purple, LLQ dialysis port, R shin abrasion  Lines/Drains: 1 PIV infusing heparin  Activity: A2, T/R, up to chair x1  Sleep: slept most of morning after not much sleep overnight  Abnormal Labs: lactic 1.2, WBC trending up, BG, procal 1.09 but lower than previous value     Aggression Stop Light: Green          Patient Care Plan: cluster cares due to pt delirium,  pulmonary tolieting, wound change done on R russo and R feel, podiatry following- see note, vascular following- see note. PD tonight- set up for tonight.   "

## 2025-02-06 NOTE — PROVIDER NOTIFICATION
MD Notification    Notified Person: MD    Notified Person Name: Enu    Notification Date/Time: 2/6/25 1000    Notification Interaction: vocmark    Purpose of Notification: Hey patient was talking this morning about how he wanted to die, I think we should reconsult palliative care. Also did you see his hgb dropped to 7.6, his sepsis fired again this AM and it came back lower at 0.9.    Orders Received: MD will go see patient    Comments:

## 2025-02-06 NOTE — PROGRESS NOTES
[facility]    Medicine Progress Note - Hospitalist Service    Date of Admission:  1/21/2025    Assessment & Plan   Arnulfo Pritchett is a 64 year old male with past medical history of severe heart failure (EF 30%), ESRD (on peritoneal dialysis), chronic paroxysmal AF on warfarin, LLE PAD s/p endarterectomy, RCC s/p nephrectomy admitted on 1/21/2025 for septic shock secondary to right leg PAD with worsening right heel necrotic ulcer. The patient was seen in ED at outside hospital on 1/3/25 and diagnosed with pneumonia, later completing course of Cefdinir. He then presented on 1/21/25 to outside hospital for right leg pain and found to have right leg ulcer and transferred to [facility] for vascular surgery evaluation. Now s/p right common femoral BK popliteal/tibial endarterectomy and right common femoral to below-knee popliteal/tibial PTFE bypass performed on 1/27/25. The patient was admitted to the ICU for requirement of mechanical ventilation and pressor support following surgery for multifactorial shock.      Hospitalist service consulted 1/31/2025 for transfer out of ICU and to assist with medical management along with vascular surgery. Infectious disease also consulted.     Hx PAD of LLE s/p endarterectomy and fem-tibioperoneal trunk bypass on 10/25/24  Hx RLE arterial occlusion s/p SFA angioplasty and stent 5/2024  PAD RLE, right heel gangrene, occluded SFA, no evidence of osteomyelitis  s/p right common femoral and popliteal/tibial endarterectomy, right femoral to popliteal/tibial PTFE bypass 1/27/25   -Found to have critical limb ischemia on admission on January 21, for details see admission note.   -Distributive shock following surgery followed by the ICU service until 1/31.  -Followed by vascular surgery, podiatry, wound care, and ID.  -Coumadin has been held since admission, recently on IV heparin, vascular surgery/podiatry to review when to restart warfarin pending any potential  surgical intervention for worsening ischemic changes in toes of right foot as noted below.  -Has been maintained on statin and Plavix 75 mg daily during this hospital stay.  -Recent IV piperacillin/tazobactam (Zosyn), discontinued 2/2 per ID; follow-up cultures, monitor off antibiotics.  -Any future plans for surgical debridement as per vascular surgery and podiatry.  -Wound care per surgery and WOC.  -Pain control, see hospital orders.  -Worsening ischemic changes in 2nd-5th toes of right foot.  Podiatry and vascular surgery following.  -AM labs.     Multifactorial shock, resolved  ICU admission, weaned off vasopressors 1/30.  -Transitioned to midodrine with increase in prior to admission dose on1/31.  -Monitor blood pressure and heart rate.  -Antibiotics as above.     End-stage renal disease (ESRD), on peritoneal dialysis  Hx of secondary hyperparathyroidism and hyperphosphatemia, phosphorous elevated above baseline of 6 at most recent of 7.9   Nephrology consulted, ongoing follow-up.  Did not require hemodialysis this hospital stay.  -Ongoing peritoneal dialysis, as per nephrology.  -Continue midodrine, nephrology to review, monitor blood pressure.     Acute hypoxemic respiratory failure  Right pleural effusion  Status post thoracentesis 1/24/25, repeat 2/2/25  -Following surgery on admission by vascular surgery was intubated.  Recent pneumonia treated with 3 weeks of Ceftin prior to this admission.  No concern for pneumonia per intensivist service.  -Extubated 1/29/25.  -Right pleural effusion.  Status post thoracentesis1/24/25 with 1.5 L clear yellow fluid removed, likely transudative based on low cell count of 117, , and protein of 1.7. Possibly secondary to reduced peritoneal dialysis during hospitalization vs underlying severe HFrEF (30%).  -CT chest 2/1/25, see report, no clear pneumonia; large right pleural effusion noted.  -Wean down oxygen as able, encourage incentive spirometry.  -Antibiotics as  above, discontinued by ID 2/2.  -ID consulted 2/1 as above, ongoing follow-up.  -S/p repeat Right thoracentesis on 2/2 with 2.3 L of rolan fluid removed; additional studies, cultures (negative to date), cytology (PENDING) - needs review.**    Paroxysmal atrial fibrillation  Chronic anticoagulation prior to admission, warfarin - ON HOLD  Ischemic cardiomyopathy with HLD, CAD s/p multiple stents, and severe EFrEF (30%)   Wide complex tachycardia 2/1/25  Assessment-postoperatively has been maintained on heparin drip.  Coumadin has been held during his hospital stay.  -Echo 1/31-EF 25 to 30%.  Severe global hypokinesis with abnormal septal motion consistent with conduction abnormality.  Severe inferior wall hypokinesis.  LVH.  RV mildly dilated and mildly reduced RV function.  Mild mitral regurgitation.  Mild to moderate mitral stenosis.  Moderate aortic stenosis.  Left ventricular fairly pressures elevated.  -Episode of wide-complex tachycardia noted by nursing staff, 15 beats, up on 2/1/2025.  -Continue inpatient telemetry.  -Consider follow-up cardiology consultation if recurrent wide-complex tachycardia in the setting of left ventricular dysfunction.  -Restarted prior to admission beta-blocker, Toprol XL, at adjusted dose, monitoring heart rate and blood pressure and consider titrating back up to PTA dose as indicated.  -Continue IV heparin anticoagulation, with future transition to oral Coumadin as prior to admission when appropriate from vascular surgery/podiatry standpoint.     Moderate protein calorie nutrition  -Nutrition consulted.  -Speech and language therapy (SLP) consulted, diet per speech therapy.     Hyperglycemia  Type 2 diabetes   hemoglobin A1c 5.7% October 24  On insulin prior to admission.  -Monitor blood sugars.  -Continue insulin, adjust as needed.  -Blood sugars fairly well controlled.  -Monitor for hypoglycemia.    Mixed anion gap acidosis  -Improved respiratory function, monitor.  Anion gap  resolved 1/31.     Abdominal discomfort 1/30, resolved  Noted to have some right upper quadrant pain 1/30.  Now resolved.  LFTs normal.  Was on Zosyn for above surgical issues.   -Monitor abdominal exam.  -Antibiotics managed as noted above.     Anemia of chronic disease  Baseline around 10.  Has been stable in the 8 range.  No signs of bleeding.  -Hemoglobin fairly stable around 9, slightly lower at 8.6 on 2/4/25, plan to for recheck in afternoon on 2/4/25.    Gout  -PTA allopurinol currently on hold, reassess daily.     History of renal cell carcinoma   -Status post right nephrectomy, monitor.     History of obstructive sleep apnea.  By report, intolerant of CPAP   -Monitor, follow-up with outpatient provider.    Weakness and deconditioning  -PT, OT, speech and language therapy (SLP) consulted.  -Increase activity as tolerated.    Disposition  Anticipated discharge timing see Disposition Plan below.  -Anticipated discharge to transitional care unit.  -Social work consulted for discharge planning.          Diet: Snacks/Supplements Adult: Expedite Cup; Between Meals  Snacks/Supplements Adult: Nepro Oral Supplement; Between Meals  Combination Diet Minced and Moist Diet (level 5); Thin Liquids (level 0) (Sit at 90 degrees, alternate textures)    DVT Prophylaxis: IV heparin  Rosario Catheter: Not present  Lines: None     Cardiac Monitoring: ACTIVE order. Indication: Tachyarrhythmias, acute (48 hours)  Code Status: Full Code      Clinically Significant Risk Factors         # Hyponatremia: Lowest Na = 129 mmol/L in last 2 days, will monitor as appropriate  # Hypochloremia: Lowest Cl = 90 mmol/L in last 2 days, will monitor as appropriate      # Hypoalbuminemia: Lowest albumin = 1.8 g/dL at 1/30/2025  3:17 PM, will monitor as appropriate     # Hypertension: Noted on problem list    # Chronic heart failure with reduced ejection fraction: last echo with EF <40%          # DMII: A1C = 6.6 % (Ref range: <5.7 %) within past 6  months    # Severe Malnutrition: based on nutrition assessment      # Financial/Environmental Concerns:           Social Drivers of Health    Tobacco Use: Medium Risk (1/27/2025)    Patient History     Smoking Tobacco Use: Former     Smokeless Tobacco Use: Never   Alcohol Use: Unknown (11/28/2018)    [facility]    AUDIT-C     Frequency of Alcohol Consumption: Monthly or less     Frequency of Binge Drinking: Never   Transportation Needs: High Risk (1/21/2025)    Transportation Needs     Within the past 12 months, has lack of transportation kept you from medical appointments, getting your medicines, non-medical meetings or appointments, work, or from getting things that you need?: Yes   Physical Activity: Unknown (10/8/2024)    Exercise Vital Sign     Days of Exercise per Week: 0 days   Social Connections: Unknown (10/8/2024)    Social Connection and Isolation Panel [NHANES]     Frequency of Social Gatherings with Friends and Family: Patient declined          Disposition Plan     Medically Ready for Discharge: Anticipated in 2-4 Days, unclear pending vascular surgery/podiatry recommendations regarding worsening ischemic changes of toes in right foot; transition off IV heparin back to warfarin; TCU bed availability.         Heidi Chang MD  Hospitalist Service  [facility]  Securely message with [employer] (more info)  Text page via [employer] Paging/Directory   ______________________________________________________________________    Interval History   Patient laying in bed, appears more lethargic today than previous days as per nursing staff. He was able to respond to verbal stimuli and follow some commands. He notes worse generalized weakness. WBC appears to be rising daily. Will reconsult ID for reeval.     Physical Exam   Vital Signs: Temp: 97.6  F (36.4  C) Temp src: Oral BP: 117/84 Pulse: 90   Resp:  22 SpO2: 98 % O2 Device: None (Room air) Oxygen Delivery: 1 LPM  Weight: 181 lbs 3.49 oz    General Appearance: Well appearing for stated age.  Respiratory: CTAB, no rales or ronchi  Cardiovascular: S1, S2 normal, no murmurs  GI: non-tender on palpation, BS present      Medical Decision Making       40 MINUTES SPENT BY ME on the date of service doing chart review, history, exam, documentation & further activities per the note.      Data     I have personally reviewed the following data over the past 24 hrs:    18.6 (H)  \   7.6 (L)   / 472 (H)     131 (L) 93 (L) 71.0 (H) /  151 (H)   4.4 24 6.03 (H) \     Procal: N/A CRP: N/A Lactic Acid: 0.9         Imaging results reviewed over the past 24 hrs:   Recent Results (from the past 24 hours)   XR Chest Port 1 View    Narrative    EXAM: XR CHEST PORT 1 VIEW  LOCATION: [facility]  DATE: 2/5/2025    INDICATION: Shortness of Breath  COMPARISON: Chest x-ray 2/3/2025 and CT chest 2/1/2025, recent thoracentesis to 2/20/2025 with 2.3 L removed..      Impression    IMPRESSION: Small right pleural effusion layering posteriorly extending into the minor fissure less prominent when compared to recent CT. No pneumothorax. Mild atelectasis. Generalized cardiac enlargement. Lungs otherwise clear.

## 2025-02-06 NOTE — PROGRESS NOTES
VASCULAR SURGERY PROGRESS NOTE    Subjective:  No acute events overnight. On 2L NC. WBC downtrending to 18.6 from 19.3 yesterday. Ischemic changes to R 2nd/3rd toe    Objective:  Intake/Output Summary (Last 24 hours) at 2/5/2025 0813  Last data filed at 2/5/2025 0600  Gross per 24 hour   Intake 200 ml   Output 155 ml   Net 45 ml     PHYSICAL EXAM:  BP (!) 142/101 (BP Location: Right arm)   Pulse 111   Temp 97.8  F (36.6  C) (Axillary)   Resp 18   Wt 82.2 kg (181 lb 3.5 oz)   SpO2 99%   BMI 23.91 kg/m    Alert and oriented x4, neurologically intact  Nonlabored breathing, on 2L NC  Abdomen remains soft, nondistended, nontender, groin incision intact, incisions intact  R lower extremity has monophasic(strong) AT signal with slightly weaker PT   groin site remains soft, mildly tender, slight hematoma    WBC: 18.6   Cr: 6.03  Hgb: 7.6    ASSESSMENT:  64 year old male who is POD9 s/p R fem-BK pop bypass with PTFE and proximal short GSV interposition graft.Out of ICU in [facility]. Worsening R pleural effusion of unclear etiology despite thoracentesis, most recent thoracentesis on 2/2      PLAN:  -NGTD from cultures on 2/2  -oob, physical therapy  - ID recs repeat blood cultures and observing pt after 12 day course of antibiotics now finished, WBC 18.6 was 19.3 yesterday  - podiatry recs offloading rooke boot at all times, will update podiatry with ichemic R toes changes   -plavix, heparin drip  -speech involved  -appreciate all teams involved in patient's care    Discussed pt history, exam, assessment and plan with vascular surgery staff    Evelia Appiah PA-C  Vascular Surgery

## 2025-02-06 NOTE — PROGRESS NOTES
Renal Medicine Progress Note            Assessment/Plan:     Arnulfo Pritchett is a 64 year old male CAD, HTN, HLD, pafib, HFrEF (EF 20-25%), ESRD on PD, DM2, TALI, RCC s/p R nephrectomy (2022), PAD with multiple LE procedures      # End stage renal disease on PD  Chronic PD for last 3.5 years.  Home unit [facility], nephrologist Dr. May  Rx: 5 cycles, 2 L fill volumes, 9 hours, last fill 1.2 L with external.     # Moderate R pleural effusion: Status post thoracentesis on 1/24 and 2/2.  Transudative  Chest x-ray on 2/5 shows small layering effusion on right side     # PAD, right heel gangrene, occluded right SFA.  Status post rt fem - below knee popliteal bypass this admission                              Other issues-  Diabetes mellitus  Chronic paroxysmal atrial fibrillation  Secondary hyperparathyroidism and hyperphosphatemia  Severe heart failure with EF of 30% and mild RV dysfunction.      Plan:  Pt has not been getting extraneal here. UF is about 700 ml with 2.5% and around 1600 cc with mix of 4.5% - 2.5%    #  PD order placed.  2.5% and 4.5% mix, 2 L fill x 5 over 9 hours            Interval History:     Following for PD management.  No issues with PD overnight.  2.5% , 2 L fill x 5 over 9 hour   Total  ml  Reviewed x-ray from yesterday.  Small layering effusion on right side           Medications and Allergies:     Current Facility-Administered Medications   Medication Dose Route Frequency Provider Last Rate Last Admin    - MEDICATION INSTRUCTIONS for Dialysis Patients -   Does not apply See Admin Instructions Walter Dempsey MD        acetaminophen (TYLENOL) tablet 975 mg  975 mg Oral or Feeding Tube Q8H Walter Dempsey MD   975 mg at 02/06/25 0328    Or    acetaminophen (TYLENOL) Suppository 650 mg  650 mg Rectal Q8H Walter Dempsey MD        [Held by provider] allopurinol (ZYLOPRIM) tablet 200 mg  200 mg Oral QPM Walter Dempsey MD   200 mg at 01/26/25 1957    atorvastatin  (LIPITOR) tablet 40 mg  40 mg Oral or Feeding Tube At Bedtime Walter Dempsey MD   40 mg at 02/05/25 2026    calcitRIOL (ROCALTROL) capsule 0.25 mcg  0.25 mcg Oral At Bedtime Walter Dempsey MD   0.25 mcg at 02/05/25 2026    cinacalcet (SENSIPAR) tablet 60 mg  60 mg Oral Q Mon Wed Fri AM Walter Dempsey MD   60 mg at 02/05/25 2026    clopidogrel (PLAVIX) tablet 75 mg  75 mg Oral or Feeding Tube QPM Walter Dempsey MD   75 mg at 02/05/25 2026    insulin aspart (NovoLOG) injection (RAPID ACTING)   Subcutaneous TID w/meals Walter Dempsey MD   3 Units at 02/04/25 1211    insulin aspart (NovoLOG) injection (RAPID ACTING)  1-7 Units Subcutaneous TID AC Walter Dempsey MD   1 Units at 02/06/25 0840    insulin aspart (NovoLOG) injection (RAPID ACTING)  1-5 Units Subcutaneous At Bedtime Walter Dempsey MD        insulin glargine (LANTUS PEN) injection 15 Units  15 Units Subcutaneous Daily Walter Dempsey MD   15 Units at 02/06/25 0840    melatonin tablet 5 mg  5 mg Oral At Bedtime Walter Dempsey MD   5 mg at 02/05/25 2026    metoprolol succinate ER (TOPROL-XL) 24 hr half-tab 12.5 mg  12.5 mg Oral Daily Larry Askew MD   12.5 mg at 02/06/25 0839    midodrine (PROAMATINE) tablet 10 mg  10 mg Oral BID w/meals Walter Dempsey MD   10 mg at 02/06/25 0838    multivitamin RENAL (TRIPHROCAPS) capsule 1 capsule  1 capsule Oral Daily Walter Dempsey MD   1 capsule at 02/06/25 0838    pantoprazole (PROTONIX) 2 mg/mL suspension 40 mg  40 mg Per Feeding Tube QAM  Walter Dempsey MD   40 mg at 02/06/25 0954    Or    pantoprazole (PROTONIX) IV push injection 40 mg  40 mg Intravenous QAM  Walter Dempsey MD   40 mg at 02/05/25 0903    polyethylene glycol (MIRALAX) Packet 17 g  17 g Oral or NG Tube Daily Walter Dempsey MD   17 g at 02/06/25 0839    senna-docusate (SENOKOT-S/PERICOLACE) 8.6-50 MG per tablet 1 tablet  1 tablet Oral or NG Tube BID Walter Dempsey MD   1 tablet at  02/06/25 0838    sevelamer carbonate (RENVELA) tablet 800 mg  800 mg Oral TID w/meals Walter Dempsey MD   800 mg at 02/06/25 0839    sodium chloride (PF) 0.9% PF flush 3 mL  3 mL Intracatheter Q8H Walter Dempsey MD   3 mL at 02/03/25 1237      No Known Allergies         Physical Exam:   Vitals were reviewed   , Blood pressure 117/84, pulse 90, temperature 97.6  F (36.4  C), temperature source Oral, resp. rate 22, weight 82.2 kg (181 lb 3.5 oz), SpO2 98%.    Wt Readings from Last 3 Encounters:   02/06/25 82.2 kg (181 lb 3.5 oz)   01/21/25 75.8 kg (167 lb)   01/03/25 78.9 kg (174 lb)         GENERAL APPEARANCE: Frail.    RESP: Diminished at bases  CV: RRR  ABDOMEN: soft, NT  EXTREMITIES/SKIN: No edema         Data:     CBC RESULTS:     Recent Labs   Lab 02/06/25  0558 02/05/25  0532 02/04/25  1417 02/04/25  0454 02/03/25  1959/25  0539 02/02/25  0658 02/01/25  0605   WBC 18.6* 19.3*  --  16.6*  --  15.4* 14.9* 18.2*   RBC 2.44* 2.65*  --  2.80*  --  3.02* 2.91* 2.97*   HGB 7.6* 8.4* 8.6* 8.6* 10.1* 9.3* 9.1* 9.8*   HCT 23.2* 24.7*  --  25.8*  --  28.4* 27.3* 28.0*   * 437  --  390  --  376 306 319       Basic Metabolic Panel:  Recent Labs   Lab 02/06/25  0755 02/06/25  0558 02/06/25  0159 02/05/25  2327 02/05/25  1618 02/05/25  1147 02/05/25  0802 02/05/25  0532 02/04/25  0812 02/04/25  0454 02/03/25  0748 02/03/25  0539 02/02/25  0916 02/02/25  0658 02/01/25  0902 02/01/25  0605   NA  --  131*  --   --   --   --   --  129*  --  131*  --  130*  --  133*  --  132*   POTASSIUM  --  4.4  --   --   --   --   --  4.6  --  4.3  --  4.3  --  4.0  --  4.4   CHLORIDE  --  93*  --   --   --   --   --  90*  --  95*  --  94*  --  96*  --  93*   CO2  --  24  --   --   --   --   --  22  --  21*  --  22  --  22  --  22   BUN  --  71.0*  --   --   --   --   --  75.6*  --  65.3*  --  61.8*  --  57.8*  --  58.5*   CR  --  6.03*  --   --   --   --   --  6.55*  --  6.20*  --  6.32*  --  6.31*  --  6.53*   *  202* 174* 186* 115* 248*   < > 250*   < > 238*   < > 207*   < > 158*   < > 285*   TERENCE  --  9.3  --   --   --   --   --  9.6  --  9.2  --  9.6  --  9.2  --  9.5    < > = values in this interval not displayed.       INR  Recent Labs   Lab 02/01/25  0605   INR 1.44*      Attestation:   I have reviewed today's relevant vital signs, notes, medications, labs and imaging.    Jennifer Jameson MD  [facility] - Nephrology   [phone]

## 2025-02-07 LAB
ANION GAP SERPL CALCULATED.3IONS-SCNC: 15 MMOL/L (ref 7–15)
BACTERIA PLR CULT: NO GROWTH
BUN SERPL-MCNC: 70 MG/DL (ref 8–23)
CALCIUM SERPL-MCNC: 9.6 MG/DL (ref 8.8–10.4)
CHLORIDE SERPL-SCNC: 91 MMOL/L (ref 98–107)
CREAT SERPL-MCNC: 5.88 MG/DL (ref 0.67–1.17)
CRP SERPL-MCNC: 191.61 MG/L
EGFRCR SERPLBLD CKD-EPI 2021: 10 ML/MIN/1.73M2
ERYTHROCYTE [DISTWIDTH] IN BLOOD BY AUTOMATED COUNT: 19.9 % (ref 10–15)
GLUCOSE BLDC GLUCOMTR-MCNC: 108 MG/DL (ref 70–99)
GLUCOSE BLDC GLUCOMTR-MCNC: 117 MG/DL (ref 70–99)
GLUCOSE BLDC GLUCOMTR-MCNC: 159 MG/DL (ref 70–99)
GLUCOSE BLDC GLUCOMTR-MCNC: 219 MG/DL (ref 70–99)
GLUCOSE BLDC GLUCOMTR-MCNC: 240 MG/DL (ref 70–99)
GLUCOSE BLDC GLUCOMTR-MCNC: 99 MG/DL (ref 70–99)
GLUCOSE SERPL-MCNC: 187 MG/DL (ref 70–99)
GRAM STAIN RESULT: NORMAL
GRAM STAIN RESULT: NORMAL
HCO3 SERPL-SCNC: 23 MMOL/L (ref 22–29)
HCT VFR BLD AUTO: 23.4 % (ref 40–53)
HGB BLD-MCNC: 7.6 G/DL (ref 13.3–17.7)
MAGNESIUM SERPL-MCNC: 2 MG/DL (ref 1.7–2.3)
MCH RBC QN AUTO: 31.1 PG (ref 26.5–33)
MCHC RBC AUTO-ENTMCNC: 32.5 G/DL (ref 31.5–36.5)
MCV RBC AUTO: 96 FL (ref 78–100)
PHOSPHATE SERPL-MCNC: 5.3 MG/DL (ref 2.5–4.5)
PLATELET # BLD AUTO: 531 10E3/UL (ref 150–450)
POTASSIUM SERPL-SCNC: 4.8 MMOL/L (ref 3.4–5.3)
PROCALCITONIN SERPL IA-MCNC: 0.9 NG/ML
RBC # BLD AUTO: 2.44 10E6/UL (ref 4.4–5.9)
SODIUM SERPL-SCNC: 129 MMOL/L (ref 135–145)
UFH PPP CHRO-ACNC: 0.29 IU/ML
UFH PPP CHRO-ACNC: 0.56 IU/ML
WBC # BLD AUTO: 19.7 10E3/UL (ref 4–11)

## 2025-02-07 PROCEDURE — 250N000013 HC RX MED GY IP 250 OP 250 PS 637: Performed by: INTERNAL MEDICINE

## 2025-02-07 PROCEDURE — 250N000009 HC RX 250: Performed by: INTERNAL MEDICINE

## 2025-02-07 PROCEDURE — 250N000013 HC RX MED GY IP 250 OP 250 PS 637: Performed by: HOSPITALIST

## 2025-02-07 PROCEDURE — 83735 ASSAY OF MAGNESIUM: CPT | Performed by: INTERNAL MEDICINE

## 2025-02-07 PROCEDURE — 36415 COLL VENOUS BLD VENIPUNCTURE: CPT | Performed by: HOSPITALIST

## 2025-02-07 PROCEDURE — 84145 PROCALCITONIN (PCT): CPT | Performed by: HOSPITALIST

## 2025-02-07 PROCEDURE — 80048 BASIC METABOLIC PNL TOTAL CA: CPT | Performed by: INTERNAL MEDICINE

## 2025-02-07 PROCEDURE — 84100 ASSAY OF PHOSPHORUS: CPT | Performed by: INTERNAL MEDICINE

## 2025-02-07 PROCEDURE — 99232 SBSQ HOSP IP/OBS MODERATE 35: CPT | Performed by: REGISTERED NURSE

## 2025-02-07 PROCEDURE — 90945 DIALYSIS ONE EVALUATION: CPT

## 2025-02-07 PROCEDURE — 90945 DIALYSIS ONE EVALUATION: CPT | Performed by: INTERNAL MEDICINE

## 2025-02-07 PROCEDURE — 120N000013 HC R&B IMCU

## 2025-02-07 PROCEDURE — 86140 C-REACTIVE PROTEIN: CPT | Performed by: HOSPITALIST

## 2025-02-07 PROCEDURE — 85520 HEPARIN ASSAY: CPT | Performed by: HOSPITALIST

## 2025-02-07 PROCEDURE — 250N000011 HC RX IP 250 OP 636: Performed by: INTERNAL MEDICINE

## 2025-02-07 PROCEDURE — 250N000012 HC RX MED GY IP 250 OP 636 PS 637: Performed by: HOSPITALIST

## 2025-02-07 PROCEDURE — 85018 HEMOGLOBIN: CPT | Performed by: INTERNAL MEDICINE

## 2025-02-07 PROCEDURE — 36415 COLL VENOUS BLD VENIPUNCTURE: CPT | Performed by: INTERNAL MEDICINE

## 2025-02-07 PROCEDURE — 99233 SBSQ HOSP IP/OBS HIGH 50: CPT | Performed by: HOSPITALIST

## 2025-02-07 RX ADMIN — PANTOPRAZOLE SODIUM 40 MG: 40 INJECTION, POWDER, FOR SOLUTION INTRAVENOUS at 09:05

## 2025-02-07 RX ADMIN — Medication 12.5 MG: at 09:06

## 2025-02-07 RX ADMIN — INSULIN GLARGINE 20 UNITS: 100 INJECTION, SOLUTION SUBCUTANEOUS at 09:10

## 2025-02-07 RX ADMIN — METHOCARBAMOL 500 MG: 500 TABLET ORAL at 17:49

## 2025-02-07 RX ADMIN — Medication 5 MG: at 21:22

## 2025-02-07 RX ADMIN — GENTAMICIN SULFATE: 1 CREAM TOPICAL at 05:26

## 2025-02-07 RX ADMIN — ACETAMINOPHEN 975 MG: 325 TABLET, FILM COATED ORAL at 21:23

## 2025-02-07 RX ADMIN — ACETAMINOPHEN 975 MG: 325 TABLET, FILM COATED ORAL at 05:17

## 2025-02-07 RX ADMIN — CINACALCET 60 MG: 30 TABLET ORAL at 21:22

## 2025-02-07 RX ADMIN — CALCITRIOL CAPSULES 0.25 MCG 0.25 MCG: 0.25 CAPSULE ORAL at 21:22

## 2025-02-07 RX ADMIN — METHOCARBAMOL 500 MG: 500 TABLET ORAL at 09:06

## 2025-02-07 RX ADMIN — Medication 1 CAPSULE: at 09:06

## 2025-02-07 RX ADMIN — ATORVASTATIN CALCIUM 40 MG: 40 TABLET, FILM COATED ORAL at 21:22

## 2025-02-07 RX ADMIN — MIDODRINE HYDROCHLORIDE 10 MG: 2.5 TABLET ORAL at 17:20

## 2025-02-07 RX ADMIN — SEVELAMER CARBONATE 800 MG: 800 TABLET, FILM COATED ORAL at 15:08

## 2025-02-07 RX ADMIN — HEPARIN SODIUM 1650 UNITS/HR: 10000 INJECTION, SOLUTION INTRAVENOUS at 06:37

## 2025-02-07 RX ADMIN — SEVELAMER CARBONATE 800 MG: 800 TABLET, FILM COATED ORAL at 17:20

## 2025-02-07 RX ADMIN — ACETAMINOPHEN 975 MG: 325 TABLET, FILM COATED ORAL at 13:12

## 2025-02-07 RX ADMIN — CLOPIDOGREL BISULFATE 75 MG: 75 TABLET ORAL at 21:22

## 2025-02-07 RX ADMIN — MIDODRINE HYDROCHLORIDE 10 MG: 2.5 TABLET ORAL at 09:06

## 2025-02-07 RX ADMIN — SEVELAMER CARBONATE 800 MG: 800 TABLET, FILM COATED ORAL at 09:06

## 2025-02-07 ASSESSMENT — ACTIVITIES OF DAILY LIVING (ADL)
ADLS_ACUITY_SCORE: 69
ADLS_ACUITY_SCORE: 65
ADLS_ACUITY_SCORE: 69
ADLS_ACUITY_SCORE: 65
ADLS_ACUITY_SCORE: 69
ADLS_ACUITY_SCORE: 69
ADLS_ACUITY_SCORE: 65
ADLS_ACUITY_SCORE: 69
ADLS_ACUITY_SCORE: 65
ADLS_ACUITY_SCORE: 69

## 2025-02-07 NOTE — PROGRESS NOTES
[facility]    Medicine Progress Note - Hospitalist Service    Date of Admission:  1/21/2025    Assessment & Plan   Arnulfo Pritchett is a 64 year old male with past medical history of severe heart failure (EF 30%), ESRD (on peritoneal dialysis), chronic paroxysmal AF on warfarin, LLE PAD s/p endarterectomy, RCC s/p nephrectomy admitted on 1/21/2025 for septic shock secondary to right leg PAD with worsening right heel necrotic ulcer. The patient was seen in ED at outside hospital on 1/3/25 and diagnosed with pneumonia, later completing course of Cefdinir. He then presented on 1/21/25 to outside hospital for right leg pain and found to have right leg ulcer and transferred to [facility] for vascular surgery evaluation. Now s/p right common femoral BK popliteal/tibial endarterectomy and right common femoral to below-knee popliteal/tibial PTFE bypass performed on 1/27/25. The patient was admitted to the ICU for requirement of mechanical ventilation and pressor support following surgery for multifactorial shock.      Hospitalist service consulted 1/31/2025 for transfer out of ICU and to assist with medical management along with vascular surgery. Infectious disease also consulted.     Hx PAD of LLE s/p endarterectomy and fem-tibioperoneal trunk bypass on 10/25/24  Hx RLE arterial occlusion s/p SFA angioplasty and stent 5/2024  PAD RLE, right heel gangrene, occluded SFA, no evidence of osteomyelitis  s/p right common femoral and popliteal/tibial endarterectomy, right femoral to popliteal/tibial PTFE bypass 1/27/25   -Found to have critical limb ischemia on admission on January 21, for details see admission note.   -Distributive shock following surgery followed by the ICU service until 1/31.  -Followed by vascular surgery, podiatry, wound care, and ID.  -Coumadin has been held since admission, recently on IV heparin, vascular surgery/podiatry to review when to restart warfarin pending any potential  surgical intervention for worsening ischemic changes in toes of right foot as noted below.  -Has been maintained on statin and Plavix 75 mg daily during this hospital stay.  -Recent IV piperacillin/tazobactam (Zosyn), discontinued 2/2 per ID; follow-up cultures, monitor off antibiotics.  -Any future plans for surgical debridement as per vascular surgery and podiatry.  -Wound care per surgery and WOC.  -Pain control, see hospital orders.  -Worsening ischemic changes in 2nd-5th toes of right foot.  Podiatry and vascular surgery following.       Multifactorial shock, resolved  ICU admission, weaned off vasopressors 1/30.  -Transitioned to midodrine with increase in prior to admission dose on1/31.  -Monitor blood pressure and heart rate.  -Antibiotics as above.     End-stage renal disease (ESRD), on peritoneal dialysis  Hx of secondary hyperparathyroidism and hyperphosphatemia, phosphorous elevated above baseline of 6 at most recent of 7.9   Nephrology consulted, ongoing follow-up.  Did not require hemodialysis this hospital stay.  -Ongoing peritoneal dialysis, as per nephrology.  -Continue midodrine.     Acute hypoxemic respiratory failure  Right pleural effusion  Status post thoracentesis 1/24/25, repeat 2/2/25  -Following surgery on admission by vascular surgery was intubated.  Recent pneumonia treated with 3 weeks of Ceftin prior to this admission.  No concern for pneumonia per intensivist service.  -Extubated 1/29/25.  -Right pleural effusion.  Status post thoracentesis1/24/25 with 1.5 L clear yellow fluid removed, likely transudative based on low cell count of 117, , and protein of 1.7. Possibly secondary to reduced peritoneal dialysis during hospitalization vs underlying severe HFrEF (30%).  -CT chest 2/1/25, see report, no clear pneumonia; large right pleural effusion noted.  -Wean down oxygen as able, encourage incentive spirometry.  -Antibiotics as above, discontinued by ID 2/2.  -ID consulted 2/1 as  above, ongoing follow-up.  -S/p repeat Right thoracentesis on 2/2 with 2.3 L of rolan fluid removed; additional studies, cultures (negative to date), cytology (negative for malignancy)     Paroxysmal atrial fibrillation  Chronic anticoagulation prior to admission, warfarin - ON HOLD  Ischemic cardiomyopathy with HLD, CAD s/p multiple stents, and severe EFrEF (30%)   Wide complex tachycardia 2/1/25  Assessment-postoperatively has been maintained on heparin drip.  Coumadin has been held during his hospital stay.  -Echo 1/31-EF 25 to 30%.  Severe global hypokinesis with abnormal septal motion consistent with conduction abnormality.  Severe inferior wall hypokinesis.  LVH.  RV mildly dilated and mildly reduced RV function.  Mild mitral regurgitation.  Mild to moderate mitral stenosis.  Moderate aortic stenosis.  Left ventricular fairly pressures elevated.  -Episode of wide-complex tachycardia noted by nursing staff, 15 beats, up on 2/1/2025.  -Continue inpatient telemetry.  -Consider follow-up cardiology consultation if recurrent wide-complex tachycardia in the setting of left ventricular dysfunction.  -Restarted prior to admission beta-blocker, Toprol XL, at adjusted dose, monitoring heart rate and blood pressure and consider titrating back up to PTA dose as indicated.  -Continue IV heparin anticoagulation, with future transition to oral Coumadin as prior to admission when appropriate from vascular surgery/podiatry standpoint.     Moderate protein calorie nutrition  -Nutrition consulted.  -Speech and language therapy (SLP) consulted, diet per speech therapy.     Hyperglycemia  Type 2 diabetes   hemoglobin A1c 5.7% October 24  On insulin prior to admission.  - increase lantus from 15U to 20U on 2/6/25 for optimal   -Continue insulin, adjust as needed.  -Monitor for hypoglycemia.  - continue to monitor BG    Mixed anion gap acidosis  -Improved respiratory function, monitor.  Anion gap resolved 1/31.     Abdominal  discomfort 1/30, resolved  Noted to have some right upper quadrant pain 1/30.  Now resolved.  LFTs normal.  Was on Zosyn for above surgical issues.   -Monitor abdominal exam.  -Antibiotics managed as noted above.     Anemia of chronic disease  Baseline around 10.  Has been stable in the 8 range.  No signs of bleeding.  -Hemoglobin stable     Gout  -PTA allopurinol currently on hold, reassess daily.     History of renal cell carcinoma   -Status post right nephrectomy, monitor.     History of obstructive sleep apnea.  By report, intolerant of CPAP   -Monitor, follow-up with outpatient provider.    Weakness and deconditioning  -PT, OT, speech and language therapy (SLP) consulted.  -Increase activity as tolerated.    Disposition  Anticipated discharge timing see Disposition Plan below.  -Anticipated discharge to transitional care unit.  -Social work consulted for discharge planning.          Diet: Snacks/Supplements Adult: Expedite Cup; Between Meals  Snacks/Supplements Adult: Nepro Oral Supplement; Between Meals  Combination Diet Soft and Bite Sized Diet (level 6); Thin Liquids (level 0) (Sit at 90 degrees, alternate textures)    DVT Prophylaxis: IV heparin  Rosario Catheter: Not present  Lines: None     Cardiac Monitoring: ACTIVE order. Indication: [facility]  Code Status: Full Code      Clinically Significant Risk Factors         # Hyponatremia: Lowest Na = 129 mmol/L in last 2 days, will monitor as appropriate  # Hypochloremia: Lowest Cl = 91 mmol/L in last 2 days, will monitor as appropriate      # Hypoalbuminemia: Lowest albumin = 1.8 g/dL at 1/30/2025  3:17 PM, will monitor as appropriate     # Hypertension: Noted on problem list    # Chronic heart failure with reduced ejection fraction: last echo with EF <40%          # DMII: A1C = 6.6 % (Ref range: <5.7 %) within past 6 months    # Severe Malnutrition: based on nutrition assessment      # Financial/Environmental Concerns:           Social Drivers of Health    Tobacco  Use: Medium Risk (1/27/2025)    Patient History     Smoking Tobacco Use: Former     Smokeless Tobacco Use: Never   Alcohol Use: Unknown (11/28/2018)    [facility]    AUDIT-C     Frequency of Alcohol Consumption: Monthly or less     Frequency of Binge Drinking: Never   Transportation Needs: High Risk (1/21/2025)    Transportation Needs     Within the past 12 months, has lack of transportation kept you from medical appointments, getting your medicines, non-medical meetings or appointments, work, or from getting things that you need?: Yes   Physical Activity: Unknown (10/8/2024)    Exercise Vital Sign     Days of Exercise per Week: 0 days   Social Connections: Unknown (10/8/2024)    Social Connection and Isolation Panel [NHANES]     Frequency of Social Gatherings with Friends and Family: Patient declined          Disposition Plan     Medically Ready for Discharge: Anticipated in 2-4 Days, unclear pending vascular surgery/podiatry recommendations regarding worsening ischemic changes of toes in right foot; transition off IV heparin back to warfarin; TCU bed availability.         Heidi Chang MD  Hospitalist Service  [facility]  Securely message with [employer] (more info)  Text page via [facility] [employer] Paging/Directory   ______________________________________________________________________    Interval History   Patient feeling better today.   Appreciate palliative care, pt wants restorative cares.    Physical Exam   Vital Signs: Temp: 97.5  F (36.4  C) Temp src: Oral BP: 113/86 Pulse: 95   Resp: 16 SpO2: 99 % O2 Device: None (Room air) Oxygen Delivery: 1/2 LPM  Weight: 185 lbs 6.51 oz    General Appearance: Well appearing for stated age.  Respiratory: CTAB, no rales or ronchi  Cardiovascular: S1, S2 normal, no murmurs  GI: non-tender on palpation, BS present      Medical Decision Making       40 MINUTES SPENT BY  ME on the date of service doing chart review, history, exam, documentation & further activities per the note.      Data     I have personally reviewed the following data over the past 24 hrs:    19.7 (H)  \   7.6 (L)   / 531 (H)     129 (L) 91 (L) 70.0 (H) /  108 (H)   4.8 23 5.88 (H) \     Procal: 0.90 (H) CRP: 191.61 (H) Lactic Acid: N/A         Imaging results reviewed over the past 24 hrs:   No results found for this or any previous visit (from the past 24 hours).

## 2025-02-07 NOTE — PLAN OF CARE
Goal Outcome Evaluation:    [id number]    Orientations: A/O x4  Vitals/Pain: Tachycardic otherwise VSS on RA. LS are dim. Infrequent productive cough. Pain managed w/ PRN robaxin x2  Tele: SR w/ 1st AVB and PVCs  Lines/Drains: PIV x1 infusing heparin gtt at 1450 units/hr.  Skin/Wounds: R toes ischemia. R heel wound, dressing CDI. RLE incision sites, WDL, dressing CDI.   GI/: Small UOP, pt on PD. No BM during shift, BS audible   Labs: Abnormal/Trends, Electrolyte Replacement- hemoglobin 7.5. Sodium 129. Heparin Anti Xa 0.56, 0.29, recheck at 2322  Ambulation/Assist: Assist x2 turn and repo. Pt refusing reposition at times, not OOB during shift.   Sleep Quality: Poor per pt  Diet: Soft bite size diet, tolerating well.   Plan: Plan for PD overnight

## 2025-02-07 NOTE — PLAN OF CARE
"Patient Name: Sherman  MRN: [id number]  Date of Admission: 1/21/2025  Reason for Admission: cellulitis  Level of Care: [facility]    Vitals:   BP Readings from Last 1 Encounters:  02/06/25 : 110/74    Pulse Readings from Last 1 Encounters:  02/06/25 : 98    Wt Readings from Last 1 Encounters:  02/06/25 : 82.2 kg (181 lb 3.5 oz)    Ht Readings from Last 1 Encounters:  01/21/25 : 1.854 m (6' 1\")    Estimated body mass index is 23.91 kg/m  as calculated from the following:    Height as of an earlier encounter on 1/21/25: 1.854 m (6' 1\").    Weight as of this encounter: 82.2 kg (181 lb 3.5 oz).  Temp Readings from Last 1 Encounters:  02/06/25 : 98.2  F (36.8  C) (Oral)        Pain: Pain goal 1/10 Pain Rating 4/10 Effective pain medication/regimen tylenol and rest    CV Surgery Patient: No    Assessment    Resp: 1L NC, complains of slightly SOB  Telemetry: NSR with PVCs  Neuro: intact, slight droop to L mouth- per pt hx of bell palsy, Cms intact, DP pulsees doppler +1, PT doppler +2, palpation popliteal R   GI/: incontinent bowel- 1 small BM, urinates in urinal at times- pt on PD  Skin/Wounds:  R heel PI, R middle toes black/purple, LLQ dialysis port, R shin abrasion  Lines/Drains: piv infusing heparin  Activity: A2, T/R, lift, up in chair x2 today  Sleep: napped throughout day- pt complaints of weakness  Abnormal Labs: hgb dropped 1 point, following BG    Aggression Stop Light: Green          Patient Care Plan: hgb following  "

## 2025-02-07 NOTE — PROGRESS NOTES
VASCULAR SURGERY PROGRESS NOTE    Subjective:  Patient resting comfortably in bed this morning. No acute events overnight. On 2L NC. WBC 19.7 today, 18.6 yesterday.  Ischemic changes to R 2nd/3rd toe    Objective:  Intake/Output Summary (Last 24 hours) at 2/5/2025 0813  Last data filed at 2/5/2025 0600  Gross per 24 hour   Intake 200 ml   Output 155 ml   Net 45 ml     PHYSICAL EXAM:  BP (!) 143/102   Pulse 90   Temp 97.7  F (36.5  C) (Oral)   Resp 18   Wt 84.1 kg (185 lb 6.5 oz)   SpO2 98%   BMI 24.46 kg/m    Alert and oriented x4, neurologically intact  Nonlabored breathing, on 2L NC  Abdomen remains soft, nondistended, nontender, groin incision intact, incisions intact  R lower extremity has monophasic(strong) AT signal with slightly weaker PT   groin site remains soft, mildly tender, slight hematoma improving     WBC: 19.7   Cr: 5.88  Hgb: 7.6    ASSESSMENT:  64 year old male who is POD10 s/p R fem-BK pop bypass with PTFE and proximal short GSV interposition graft.Out of ICU in [facility]. Worsening R pleural effusion of unclear etiology despite thoracentesis, most recent thoracentesis on 2/2    PLAN:  - NGTD from cultures on 2/2  - oob, physical therapy  - ID recs repeat blood cultures and observing pt after 12 day course of antibiotics now finished, suspect right thigh hematoma for ongoing leukocytosis, WBC 19.7 today, 18.6 yesterday  - podiatry recs offloading rooke boot at all times and observing demarcation of toes for further planning  -plavix, heparin drip  -speech involved  - appreciate all teams involved in patient's care    Patient seen and examined with vascular surgery fellow, Dr. Maki.     Evelia Appiah PA-C  Vascular Surgery

## 2025-02-07 NOTE — PLAN OF CARE
Vital signs stable on 2L nasal cannula. Alert and oriented x4. Lung sounds diminished. Bowel sounds active, flatus present. Lower extremity wound dressings clean dry and intact. Patient denies pain. Up with lift. Tolerating minced moist diet. CMS/neuros at baseline with doppler pulses in lower extremities. Voiding adequately to urinal. Tele sinus rhythm. Heparin gtt continued. PD stopped at 0500 with dressing changed.

## 2025-02-07 NOTE — PROGRESS NOTES
Renal Medicine Progress Note            Assessment/Plan:     Arnulfo Pritchett is a 64 year old male CAD, HTN, HLD, pafib, HFrEF (EF 20-25%), ESRD on PD, DM2, TALI, RCC s/p R nephrectomy (2022), PAD with multiple LE procedures      # End stage renal disease on PD  Chronic PD for last 3.5 years.  Home unit [facility], nephrologist Dr. May  Rx: 5 cycles, 2 L fill volumes, 9 hours, last fill 1.2 L with external.     # Moderate R pleural effusion: Status post thoracentesis on 1/24 and 2/2.  Transudative  Chest x-ray on 2/5 shows small layering effusion on right side     # PAD, right heel gangrene, occluded right SFA.  Status post rt fem - below knee popliteal bypass this admission                              Other issues-  Diabetes mellitus  Chronic paroxysmal atrial fibrillation  Secondary hyperparathyroidism and hyperphosphatemia  Severe heart failure with EF of 30% and mild RV dysfunction.      Discussion/plan:  Patient reports he is tired of doing PD and does not think he is thriving on it.  He wants to know if hemodialysis is going to be an option.  If not, he reports he has been thinking about pursuing palliative care, but would like to try hemodialysis first.  I have placed a call out to his primary nephrologist to discuss his options further.  Continue PD for now        Pt has not been getting extraneal here. UF is about 700 ml with 2.5% and around 1600 cc with mix of 4.5% - 2.5%    #  PD order placed.  2.5% , 2 L fill x 5 over 9 hours            Interval History:     Following for PD management.  No issues with PD overnight.  Had a mix of 4.5% and 2.5% overnight.  Total UF of 1450 cc.  Reports feeling weak.  Inquiring about switching to hemodialysis.           Medications and Allergies:     Current Facility-Administered Medications   Medication Dose Route Frequency Provider Last Rate Last Admin    - MEDICATION INSTRUCTIONS for Dialysis Patients -   Does not apply See Admin Instructions Walter Dempsey  MD JESSIE        acetaminophen (TYLENOL) tablet 975 mg  975 mg Oral or Feeding Tube Q8H Walter Dempsey MD   975 mg at 02/07/25 0517    Or    acetaminophen (TYLENOL) Suppository 650 mg  650 mg Rectal Q8H Walter Dempsey MD        [Held by provider] allopurinol (ZYLOPRIM) tablet 200 mg  200 mg Oral QPM Walter Dempsey MD   200 mg at 01/26/25 1957    atorvastatin (LIPITOR) tablet 40 mg  40 mg Oral or Feeding Tube At Bedtime Walter Dempsey MD   40 mg at 02/06/25 2003    calcitRIOL (ROCALTROL) capsule 0.25 mcg  0.25 mcg Oral At Bedtime Walter Dempsey MD   0.25 mcg at 02/06/25 2003    cinacalcet (SENSIPAR) tablet 60 mg  60 mg Oral Q Mon Wed Fri AM Walter Dempsey MD   60 mg at 02/05/25 2026    clopidogrel (PLAVIX) tablet 75 mg  75 mg Oral or Feeding Tube QPM Walter Dempsey MD   75 mg at 02/06/25 2003    insulin aspart (NovoLOG) injection (RAPID ACTING)   Subcutaneous TID w/meals Walter Dempsey MD   3 Units at 02/07/25 0909    insulin aspart (NovoLOG) injection (RAPID ACTING)  1-7 Units Subcutaneous TID AC Walter Dempsey MD   1 Units at 02/07/25 0909    insulin aspart (NovoLOG) injection (RAPID ACTING)  1-5 Units Subcutaneous At Bedtime Walter Dempsey MD   1 Units at 02/07/25 0040    insulin glargine (LANTUS PEN) injection 20 Units  20 Units Subcutaneous Daily Enu-Heidi Méndez MD   20 Units at 02/07/25 0910    melatonin tablet 5 mg  5 mg Oral At Bedtime Walter Dempsey MD   5 mg at 02/06/25 2002    metoprolol succinate ER (TOPROL-XL) 24 hr half-tab 12.5 mg  12.5 mg Oral Daily Larry Askew MD   12.5 mg at 02/07/25 0906    midodrine (PROAMATINE) tablet 10 mg  10 mg Oral BID w/meals Walter Dempsey MD   10 mg at 02/07/25 0906    multivitamin RENAL (TRIPHROCAPS) capsule 1 capsule  1 capsule Oral Daily Walter Dempsey MD   1 capsule at 02/07/25 0906    pantoprazole (PROTONIX) 2 mg/mL suspension 40 mg  40 mg Per Feeding Tube [facility] Walter Dempsey MD   40 mg at 02/06/25  0954    Or    pantoprazole (PROTONIX) IV push injection 40 mg  40 mg Intravenous QAM AC Walter Dempsey MD   40 mg at 02/07/25 0905    polyethylene glycol (MIRALAX) Packet 17 g  17 g Oral or NG Tube Daily Walter Dempsey MD   17 g at 02/06/25 0839    senna-docusate (SENOKOT-S/PERICOLACE) 8.6-50 MG per tablet 1 tablet  1 tablet Oral or NG Tube BID Walter Dempsey MD   1 tablet at 02/06/25 0838    sevelamer carbonate (RENVELA) tablet 800 mg  800 mg Oral TID w/meals Walter Dempsey MD   800 mg at 02/07/25 0906    sodium chloride (PF) 0.9% PF flush 3 mL  3 mL Intracatheter Q8H Walter Dempsey MD   3 mL at 02/03/25 1237      No Known Allergies         Physical Exam:   Vitals were reviewed   , Blood pressure 108/75, pulse 97, temperature 97.7  F (36.5  C), temperature source Oral, resp. rate 15, weight 84.1 kg (185 lb 6.5 oz), SpO2 98%.    Wt Readings from Last 3 Encounters:   02/07/25 84.1 kg (185 lb 6.5 oz)   01/21/25 75.8 kg (167 lb)   01/03/25 78.9 kg (174 lb)         GENERAL APPEARANCE: Frail.    RESP: Diminished at bases  CV: RRR  ABDOMEN: soft, NT  EXTREMITIES/SKIN: + edema around thighs         Data:     CBC RESULTS:     Recent Labs   Lab 02/07/25  0607 02/06/25  1309 02/06/25  0558 02/05/25  0532 02/04/25  1417 02/04/25  0454 02/03/25  1959/25  0539 02/02/25  0658   WBC 19.7*  --  18.6* 19.3*  --  16.6*  --  15.4* 14.9*   RBC 2.44*  --  2.44* 2.65*  --  2.80*  --  3.02* 2.91*   HGB 7.6* 7.5* 7.6* 8.4* 8.6* 8.6*   < > 9.3* 9.1*   HCT 23.4*  --  23.2* 24.7*  --  25.8*  --  28.4* 27.3*   *  --  472* 437  --  390  --  376 306    < > = values in this interval not displayed.       Basic Metabolic Panel:  Recent Labs   Lab 02/07/25  1300 02/07/25  0821 02/07/25  0607 02/07/25  0312 02/07/25  0034 02/06/25  2110 02/06/25  0755 02/06/25  0558 02/05/25  0802 02/05/25  0532 02/04/25  0812 02/04/25  0454 02/03/25  0748 02/03/25  0539 02/02/25  0916 02/02/25  0658   NA  --   --  129*  --   --   --    --  131*  --  129*  --  131*  --  130*  --  133*   POTASSIUM  --   --  4.8  --   --   --   --  4.4  --  4.6  --  4.3  --  4.3  --  4.0   CHLORIDE  --   --  91*  --   --   --   --  93*  --  90*  --  95*  --  94*  --  96*   CO2  --   --  23  --   --   --   --  24  --  22  --  21*  --  22  --  22   BUN  --   --  70.0*  --   --   --   --  71.0*  --  75.6*  --  65.3*  --  61.8*  --  57.8*   CR  --   --  5.88*  --   --   --   --  6.03*  --  6.55*  --  6.20*  --  6.32*  --  6.31*   * 159* 187* 219* 240* 225*   < > 202*   < > 250*   < > 238*   < > 207*   < > 158*   TERENCE  --   --  9.6  --   --   --   --  9.3  --  9.6  --  9.2  --  9.6  --  9.2    < > = values in this interval not displayed.       INR  Recent Labs   Lab 02/01/25  0605   INR 1.44*      Attestation:   I have reviewed today's relevant vital signs, notes, medications, labs and imaging.    Jennifer Jameson MD  [facility] - Nephrology   [phone]

## 2025-02-07 NOTE — PROGRESS NOTES
Cycler set up per protocol and per treatment orders      Results from previous treatment:  Effluent color and clarity: yellow, clear  Total UF: 1459  Initial Drain: 133  Avg Dwell: 1:20     Cycler Serial Number: [id number]  Total Treatment Volume: 10,000mL  Total treatment time: 9 hrs  Fill Volume: 2000ml  Last Fill Volume:0ml  Heater ba.5% 6000mL;  Lot #: [id number];  Expiration Date:   Side Bag #1: 2.5% 6000mL;  Lot #: [id number];  Expiration Date:      Number of cycles including final fill: 5  Dwell Time: 1:22 minutes  Last Fill Volume: 0ml  Initial Drain Alarm: 0ml  PD orders reviewed with Patient procedure and ESRD teaching done and questions answered.  Cycler ready for hook-up.    Report given to: ZULEMA Branham RN

## 2025-02-07 NOTE — PROGRESS NOTES
"PALLIATIVE CARE PROGRESS NOTE  [facility]     Patient Name: Arnulfo Pritchett  Date of Admission: 1/21/2025   Today the patient was seen for: goals of care, advanced care planning.     Recommendations & Counseling       GOALS OF CARE:   Continue current cares. Patient states that he is now considering hemodialysis and wants to be evaluated to see if this is a possibility.  He is not interested in pursuing comfort focused care at this time. He states that \"I had a bad day yesterday.\"    Palliative care will continue to follow for ongoing goals of care discussion as needed.    ADVANCE CARE PLANNING:  Keagan Mueller has the HCD on his computer. We reviewed it previously and he is listed as HCA. Ervin has sent it to my email twice but I have not received it. Will request again.     There is no POLST form on file, defer to patient and/or next of kin for decisions   Code status: Full Code - reviewed with patient who wants to keep full code status. Reviewed with keagan Mueller.    DECISION MAKING:  Patient's decision making preferences: shared with support from loved ones  Patient has decision-making capacity today for complex decisions:Questionable he is currently intubated and fatigued-is intermittently engaged.    MEDICAL MANAGEMENT:   We are not actively managing symptoms at this time.    #Risk for Delirium- multifactorial, related to infections, respiratory compromise, ICU,  medications. Improved today.   -Maintain day/night routines and orientation.  -Avoid medications such as steroids, benzodiazepines and anticholinergics.   -Optimize hydration/nutrition, and sleep.  -Utilize sensory aids to maintain orientation such as eye glasses, hearing aids or pocket talkers.  -Calm environment, quiet/reassuring voices, orienting cues, minimized noise/night disruptions, when possible.   -Maximize mobility and prevent/treat pain.      PSYCHOSOCIAL/SPIRITUAL SUPPORT:  Supportive family members- 2 daughters and a " "son who assist Arnulfo often.    Palliative Care will continue to follow. Thank you for the consult and allowing us to aid in the care of Arnulfo Pritchett.    Reviewed patient case with Dr Bernardo Stone, CNS  Securely message with [employer] (more info)  Text page via [employer] Paging/Directory       Chart documentation was completed, in part, with [employer] voice-recognition software. Even though reviewed, some grammatical, spelling, and word errors may remain.         Interval History:     Post op day ten.  Arnulfo's WBC have been high- per ID possibly related to right thigh hematoma. He has finished his course of abx and is being monitored at this time. Repeat blood cultures being taken. Has had worsening right pleural effusion- last thoracentesis on 2/02.  There is concern over lack of blood flow to toes- podiatry is following and recommends offloading rook boot and observing.    Goals of Care:  2/07: I met with Arnulfo in his room today. He is alert and oriented calling food services to order his lunch. He said he is feeling well today. We reviewed his comments to staff yesterday that he was interested in \"palliative care\" so I came by to check on him.  I discussed that we assist patients as they make decisions and often can transition patients to comfort focused care here in hospital, which I explained. He said he made these comments yesterday as he felt lousy. He wants to continue his current restorative focus. He states that he is thinking about going on hemodialysis vs the peritoneal dialysis. In the past he didn't want HD due to the fact that he was working and did not have the time to leave work for 4 hour treatments 3x per week, but now this would not be a problem for him. He did recall that HD did make him fatigued.  He will be discussing with nephrology.     We also reviewed his code status which had been changed DNR/DNI after a family meeting Last week. He said her prefers to keep it full code for now even if \"his " "ribs got all banged up.\"  He would want one valiant effort given to resuscitate him.     I called Arnulfo's son Jarvis to review our discussion. I will check in with Arnulfo next week.    1/30: Writer met with patient this morning.  He was alert and able to converse freely.  He remembers our  conversation from yesterday.  He is very thankful that he was extubated and said he would \"never want to go through that again\".  We called his son Ervin on the phone for an ongoing goals of care discussion now that patient is able to participate. We reviewed the benefits vs burdens of CPR and decision was made to change Code status to No CPR and No intubation today after review with Arnulfo and his son Ervin (who was on speaker phone). Arnulfo stated  that he would not want to be kept alive by machines if he could not interact or know what was going on around him.      We reviewed concerns regarding his ability to remain living independently after this hospitalization as he required help with his peritoneal dialysis as he could not lift the heavy fluid bags.  We discussed that family and friends would likely not be able to provide the amount of assistance that he would need to live independently in an apartment on a daily basis.  Patient was agreeable to finding a place where he would be able to receive more assistance.     Arnulfo wants to continue current cares/treatments with goal to discharge to TCU and see how things go from there. Arnulfo expressed that \"I think you are trying to talk me into palliative care.\" I explained that my hope was educate him so he can make well informed choices for himself.      Assessment          Arnulfo Pritchett is a 64 year old male with a past medical history of renal cell carcinoma s/p right nephrectomy, ESRD on peritoneal dialysis, gout, peripheral artery disease with critical resting limb arterial insufficiency of right LE,  left common femoral endarterectomy and a left common femoral artery to tibioperoneal " trunk bypass  10/2024, chronic paroxysmal A-fib, CAD s/p multiple stents, anemia, TALI, and recent pneumonia who presented to [facility] ED on 1/21/2025 with leg pain and shortness of breath.       Upon further evaluation he was found to have a moderate to large right pleural effusion s/p thoracentesis 1/24, right leg and right heel gangrene/ulcer and occluded right SFA stent.  The plan is for patient to undergo R fem-BK pop bypass with PTFE, angiogram, and possible debridement of R heel this afternoon.     Previous hospitalizations:  12/05-07: Hypotension.  11/27 - 12/03: NSTEMI versus nonischemic MRI due to/hypotension            Review of Systems:     Besides above, ROS was reviewed and is unremarkable           Physical Exam:   Temp:  [97.6  F (36.4  C)-98.2  F (36.8  C)] 97.7  F (36.5  C)  Pulse:  [] 97  Resp:  [10-24] 15  BP: (103-143)/() 108/75  SpO2:  [93 %-100 %] 98 %  185 lbs 6.51 oz  Wt Readings from Last 4 Encounters:   02/07/25 84.1 kg (185 lb 6.5 oz)   01/21/25 75.8 kg (167 lb)   01/03/25 78.9 kg (174 lb)   01/02/25 78.9 kg (173 lb 14.4 oz)     Lab Results   Component Value Date    ALBUMIN 1.8 01/30/2025     Physical Exam  GENERAL:  Alert, fatigued, no distress, intubated, frail weak, appears comfortable. HEAD: Normocephalic atraumatic  SCLERA: Anicteric  ABDOMEN:  Non distended  RESPIRATORY: non labored  NEUROLOGIC: Alert and oriented.  PSYCH: Calm, cooperative.           Current Problem List:   Active Problems:    Pleural effusion    Cellulitis    Pressure ulcer of foot    Acute respiratory failure with hypoxia (H)    Shock (H)    Toxic metabolic encephalopathy      No Known Allergies         Data Reviewed:     Lab Results   Component Value Date    WBC 19.7 (H) 02/07/2025    WBC 18.6 (H) 02/06/2025    WBC 19.3 (H) 02/05/2025    HGB 7.6 (L) 02/07/2025    HGB 7.5 (L) 02/06/2025    HGB 7.6 (L) 02/06/2025    HCT 23.4 (L) 02/07/2025    HCT 23.2 (L) 02/06/2025    HCT 24.7 (L) 02/05/2025      (H) 02/07/2025     (H) 02/06/2025     02/05/2025     (L) 02/07/2025     (L) 02/06/2025     (L) 02/05/2025    POTASSIUM 4.8 02/07/2025    POTASSIUM 4.4 02/06/2025    POTASSIUM 4.6 02/05/2025    CHLORIDE 91 (L) 02/07/2025    CHLORIDE 93 (L) 02/06/2025    CHLORIDE 90 (L) 02/05/2025    CO2 23 02/07/2025    CO2 24 02/06/2025    CO2 22 02/05/2025    BUN 70.0 (H) 02/07/2025    BUN 71.0 (H) 02/06/2025    BUN 75.6 (H) 02/05/2025    CR 5.88 (H) 02/07/2025    CR 6.03 (H) 02/06/2025    CR 6.55 (H) 02/05/2025     (H) 02/07/2025     (H) 02/07/2025     (H) 02/07/2025    SED 86 (H) 01/21/2025    SED 86 (H) 10/15/2024    SED 58 (H) 07/09/2024    NTBNPI >70,000 (H) 01/03/2025    NTBNPI 18,739 (H) 11/27/2024    AST 12 01/30/2025    AST 12 01/27/2025    AST 17 01/21/2025    ALT 6 01/30/2025    ALT 8 01/27/2025    ALT 14 01/21/2025    ALKPHOS 115 01/30/2025    ALKPHOS 101 01/27/2025    ALKPHOS 246 (H) 01/21/2025    BILITOTAL 0.2 01/30/2025    BILITOTAL 0.3 01/27/2025    BILITOTAL 0.2 01/21/2025    INR 1.44 (H) 02/01/2025    INR 1.77 (H) 01/27/2025    INR 1.46 (H) 01/23/2025       Medical Decision Making       Please see A&P for additional details of medical decision making.  MANAGEMENT DISCUSSED with the following over the past 24 hours: Dr Chang   NOTE(S)/MEDICAL RECORDS REVIEWED over the past 24 hours: Medicine, Vascular Surgery,  nephrology,   Tests REVIEWED in the past 24 hours:  - See lab/imaging results included in the data section of the note  SUPPLEMENTAL HISTORY, in addition to the patient's history, over the past 24 hours obtained from:   - Keagan Ervin

## 2025-02-08 LAB
ABO + RH BLD: NORMAL
ANION GAP SERPL CALCULATED.3IONS-SCNC: 13 MMOL/L (ref 7–15)
BLD GP AB SCN SERPL QL: NEGATIVE
BLD PROD TYP BPU: NORMAL
BLOOD COMPONENT TYPE: NORMAL
BUN SERPL-MCNC: 71.2 MG/DL (ref 8–23)
CALCIUM SERPL-MCNC: 9.8 MG/DL (ref 8.8–10.4)
CHLORIDE SERPL-SCNC: 92 MMOL/L (ref 98–107)
CODING SYSTEM: NORMAL
CREAT SERPL-MCNC: 5.87 MG/DL (ref 0.67–1.17)
CROSSMATCH: NORMAL
EGFRCR SERPLBLD CKD-EPI 2021: 10 ML/MIN/1.73M2
ERYTHROCYTE [DISTWIDTH] IN BLOOD BY AUTOMATED COUNT: 20.3 % (ref 10–15)
GLUCOSE BLDC GLUCOMTR-MCNC: 108 MG/DL (ref 70–99)
GLUCOSE BLDC GLUCOMTR-MCNC: 128 MG/DL (ref 70–99)
GLUCOSE BLDC GLUCOMTR-MCNC: 137 MG/DL (ref 70–99)
GLUCOSE BLDC GLUCOMTR-MCNC: 172 MG/DL (ref 70–99)
GLUCOSE SERPL-MCNC: 190 MG/DL (ref 70–99)
HCO3 SERPL-SCNC: 25 MMOL/L (ref 22–29)
HCT VFR BLD AUTO: 20.2 % (ref 40–53)
HGB BLD-MCNC: 6.7 G/DL (ref 13.3–17.7)
HGB BLD-MCNC: 8 G/DL (ref 13.3–17.7)
HGB BLD-MCNC: 8.1 G/DL (ref 13.3–17.7)
ISSUE DATE AND TIME: NORMAL
MAGNESIUM SERPL-MCNC: 1.9 MG/DL (ref 1.7–2.3)
MCH RBC QN AUTO: 31.3 PG (ref 26.5–33)
MCHC RBC AUTO-ENTMCNC: 33.2 G/DL (ref 31.5–36.5)
MCV RBC AUTO: 94 FL (ref 78–100)
PHOSPHATE SERPL-MCNC: 5 MG/DL (ref 2.5–4.5)
PLATELET # BLD AUTO: 488 10E3/UL (ref 150–450)
POTASSIUM SERPL-SCNC: 4.8 MMOL/L (ref 3.4–5.3)
RBC # BLD AUTO: 2.14 10E6/UL (ref 4.4–5.9)
SODIUM SERPL-SCNC: 130 MMOL/L (ref 135–145)
SPECIMEN EXP DATE BLD: NORMAL
UFH PPP CHRO-ACNC: 0.25 IU/ML
UFH PPP CHRO-ACNC: 0.28 IU/ML
UFH PPP CHRO-ACNC: 0.34 IU/ML
UNIT ABO/RH: NORMAL
UNIT NUMBER: NORMAL
UNIT STATUS: NORMAL
UNIT TYPE ISBT: 5100
WBC # BLD AUTO: 15.2 10E3/UL (ref 4–11)

## 2025-02-08 PROCEDURE — 85520 HEPARIN ASSAY: CPT | Performed by: HOSPITALIST

## 2025-02-08 PROCEDURE — P9016 RBC LEUKOCYTES REDUCED: HCPCS | Performed by: HOSPITALIST

## 2025-02-08 PROCEDURE — 82374 ASSAY BLOOD CARBON DIOXIDE: CPT | Performed by: INTERNAL MEDICINE

## 2025-02-08 PROCEDURE — 85018 HEMOGLOBIN: CPT | Performed by: HOSPITALIST

## 2025-02-08 PROCEDURE — 86850 RBC ANTIBODY SCREEN: CPT | Performed by: HOSPITALIST

## 2025-02-08 PROCEDURE — 86900 BLOOD TYPING SEROLOGIC ABO: CPT | Performed by: HOSPITALIST

## 2025-02-08 PROCEDURE — 999N000040 HC STATISTIC CONSULT NO CHARGE VASC ACCESS

## 2025-02-08 PROCEDURE — 80048 BASIC METABOLIC PNL TOTAL CA: CPT | Performed by: INTERNAL MEDICINE

## 2025-02-08 PROCEDURE — 85014 HEMATOCRIT: CPT | Performed by: INTERNAL MEDICINE

## 2025-02-08 PROCEDURE — 120N000013 HC R&B IMCU

## 2025-02-08 PROCEDURE — 250N000013 HC RX MED GY IP 250 OP 250 PS 637: Performed by: INTERNAL MEDICINE

## 2025-02-08 PROCEDURE — 36415 COLL VENOUS BLD VENIPUNCTURE: CPT | Performed by: INTERNAL MEDICINE

## 2025-02-08 PROCEDURE — 84100 ASSAY OF PHOSPHORUS: CPT | Performed by: INTERNAL MEDICINE

## 2025-02-08 PROCEDURE — 83735 ASSAY OF MAGNESIUM: CPT | Performed by: INTERNAL MEDICINE

## 2025-02-08 PROCEDURE — 250N000011 HC RX IP 250 OP 636: Performed by: INTERNAL MEDICINE

## 2025-02-08 PROCEDURE — 86923 COMPATIBILITY TEST ELECTRIC: CPT | Performed by: HOSPITALIST

## 2025-02-08 PROCEDURE — 90945 DIALYSIS ONE EVALUATION: CPT | Performed by: INTERNAL MEDICINE

## 2025-02-08 PROCEDURE — 36415 COLL VENOUS BLD VENIPUNCTURE: CPT | Performed by: HOSPITALIST

## 2025-02-08 PROCEDURE — 82310 ASSAY OF CALCIUM: CPT | Performed by: INTERNAL MEDICINE

## 2025-02-08 PROCEDURE — 250N000013 HC RX MED GY IP 250 OP 250 PS 637: Performed by: HOSPITALIST

## 2025-02-08 PROCEDURE — 999N000127 HC STATISTIC PERIPHERAL IV START W US GUIDANCE

## 2025-02-08 PROCEDURE — 99233 SBSQ HOSP IP/OBS HIGH 50: CPT | Performed by: HOSPITALIST

## 2025-02-08 RX ORDER — GENTAMICIN SULFATE 1 MG/G
CREAM TOPICAL DAILY PRN
Status: DISCONTINUED | OUTPATIENT
Start: 2025-02-08 | End: 2025-02-08

## 2025-02-08 RX ORDER — PANTOPRAZOLE SODIUM 40 MG/1
40 TABLET, DELAYED RELEASE ORAL
Status: DISCONTINUED | OUTPATIENT
Start: 2025-02-09 | End: 2025-03-01 | Stop reason: HOSPADM

## 2025-02-08 RX ADMIN — MIDODRINE HYDROCHLORIDE 10 MG: 2.5 TABLET ORAL at 08:55

## 2025-02-08 RX ADMIN — SEVELAMER CARBONATE 800 MG: 800 TABLET, FILM COATED ORAL at 13:06

## 2025-02-08 RX ADMIN — Medication 40 MG: at 08:55

## 2025-02-08 RX ADMIN — ACETAMINOPHEN 975 MG: 325 TABLET, FILM COATED ORAL at 22:01

## 2025-02-08 RX ADMIN — HEPARIN SODIUM 1450 UNITS/HR: 10000 INJECTION, SOLUTION INTRAVENOUS at 01:54

## 2025-02-08 RX ADMIN — MIDODRINE HYDROCHLORIDE 10 MG: 2.5 TABLET ORAL at 17:12

## 2025-02-08 RX ADMIN — CALCITRIOL CAPSULES 0.25 MCG 0.25 MCG: 0.25 CAPSULE ORAL at 22:02

## 2025-02-08 RX ADMIN — Medication 5 MG: at 22:02

## 2025-02-08 RX ADMIN — ACETAMINOPHEN 975 MG: 325 TABLET, FILM COATED ORAL at 04:43

## 2025-02-08 RX ADMIN — SEVELAMER CARBONATE 800 MG: 800 TABLET, FILM COATED ORAL at 17:12

## 2025-02-08 RX ADMIN — Medication 1 CAPSULE: at 08:55

## 2025-02-08 RX ADMIN — Medication 12.5 MG: at 08:55

## 2025-02-08 RX ADMIN — GENTAMICIN SULFATE: 1 CREAM TOPICAL at 22:03

## 2025-02-08 RX ADMIN — METHOCARBAMOL 500 MG: 500 TABLET ORAL at 09:07

## 2025-02-08 RX ADMIN — INSULIN GLARGINE 20 UNITS: 100 INJECTION, SOLUTION SUBCUTANEOUS at 09:07

## 2025-02-08 RX ADMIN — ATORVASTATIN CALCIUM 40 MG: 40 TABLET, FILM COATED ORAL at 22:02

## 2025-02-08 RX ADMIN — CLOPIDOGREL BISULFATE 75 MG: 75 TABLET ORAL at 22:02

## 2025-02-08 RX ADMIN — METHOCARBAMOL 500 MG: 500 TABLET ORAL at 16:57

## 2025-02-08 RX ADMIN — ACETAMINOPHEN 975 MG: 325 TABLET, FILM COATED ORAL at 12:48

## 2025-02-08 RX ADMIN — HEPARIN SODIUM 1450 UNITS/HR: 10000 INJECTION, SOLUTION INTRAVENOUS at 21:06

## 2025-02-08 ASSESSMENT — ACTIVITIES OF DAILY LIVING (ADL)
ADLS_ACUITY_SCORE: 69
ADLS_ACUITY_SCORE: 68
ADLS_ACUITY_SCORE: 69
ADLS_ACUITY_SCORE: 68
ADLS_ACUITY_SCORE: 69
ADLS_ACUITY_SCORE: 69
ADLS_ACUITY_SCORE: 71
ADLS_ACUITY_SCORE: 69
ADLS_ACUITY_SCORE: 69
ADLS_ACUITY_SCORE: 68
ADLS_ACUITY_SCORE: 69
ADLS_ACUITY_SCORE: 69

## 2025-02-08 NOTE — PROGRESS NOTES
Cycler set up per protocol and per treatment orders     Results from previous treatment:  Effluent color and clarity: yellow and clear, no fibrin noted  Total UF: 660 ml  Initial Drain: 189 ml  Avg Dwell: 1:21   Lost Dwell: 0:09    Cycler Serial Number: [id number]  Total Treatment Volume: 80879 mL  Total treatment time: 9 hrs  Fill Volume: 2000 ml  Last Fill Volume:0 ml  Heater ba.25% 6000mL;  Lot #: [id number];  Expiration Date: 2025  Side Bag #1: 2.5 % 6000mL;  Lot #: [id number];  Expiration Date: 3/2025  Casette:  [id number], Exp: 2027      Number of cycles including final fill: 5  Dwell Time: 1:22 minutes  Last Fill Volume: 0 ml  Initial Drain Alarm: 0 ml  PD orders reviewed with Patient procedure and ESRD teaching done and questions answered.  Cycler ready for hook-up.    Report given to: MARYSOL Carpenter consent form signed yes

## 2025-02-08 NOTE — PLAN OF CARE
Goal Outcome Evaluation:                      A/Ox4. Intermittent confusion. VSS ex tachycardic. On 1/2 lpm NC per patient request. Tele ST. LS diminished in bases. +bs, +flatus, -bm. Patient on peritoneal dialysis, still voids small amounts. PD ran overnight. Dressing changed. Denied pain. CMS intact Doppler for pedal pulses. Incisions to RLE cdi, nolvia. Right hip/groin with +1-2 edema and tender. Right foot also tender. Toes on right foot are ischemic (provider aware). Mepilex to right shin cdi. Dressing to right heel cdi. Repositioning as patient allows (refuses at times). Up with assist x2 and lift. Tolerating diet. Hgb 6.7 this am. Overnight provider notified. Per provider defer to day rounder.

## 2025-02-08 NOTE — PROGRESS NOTES
Renal Medicine Progress Note            Assessment/Plan:     Arnulfo Pritchett is a 64 year old male CAD, HTN, HLD, pafib, HFrEF (EF 20-25%), ESRD on PD, DM2, TALI, RCC s/p R nephrectomy (2022), PAD with multiple LE procedures      # End stage renal disease on PD  Chronic PD for last 3.5 years.  Home unit [facility], nephrologist Dr. May  Rx: 5 cycles, 2 L fill volumes, 9 hours, last fill 1.2 L with external.     # Moderate R pleural effusion: Status post thoracentesis on 1/24 and 2/2.  Transudative  Chest x-ray on 2/5 shows small layering effusion on right side     # PAD, right heel gangrene, occluded right SFA.  Status post rt fem - below knee popliteal bypass this admission    # Postsurgical anemia in patient with ESRD-hemoglobin down to 6.7.  Patient on heparin and Plavix.  No signs of active bleed.  -PRBC per primary team  -EPO 20,000 units today and continue weekly                              Other issues-  Diabetes mellitus  Chronic paroxysmal atrial fibrillation  Secondary hyperparathyroidism and hyperphosphatemia  Severe heart failure with EF of 30% and mild RV dysfunction.      Discussion/plan:  Patient reports he is tired of doing PD and does not think he is thriving on it.  He wants to know if hemodialysis is going to be an option.  If not, he reports he has been thinking about pursuing palliative care, but would like to try hemodialysis first.  I  placed a call out to his primary nephrologist to discuss his options further.  Waiting for callback  Continue PD for now        Pt has not been getting extraneal here. UF is about 700 ml with 2.5% and around 1600 cc with mix of 4.5% - 2.5%    #  PD order placed.  Mix of 4.5% and 2.5% , 2 L fill x 5 over 9 hours            Interval History:     Following for PD management.  No issues with PD overnight.  Had dialysis with all 2.5% dialysate overnight.  Total UF of around 700 cc  No new complaints           Medications and Allergies:     Current  Facility-Administered Medications   Medication Dose Route Frequency Provider Last Rate Last Admin    - MEDICATION INSTRUCTIONS for Dialysis Patients -   Does not apply See Admin Instructions Walter Dempsey MD        acetaminophen (TYLENOL) tablet 975 mg  975 mg Oral or Feeding Tube Q8H Walter Dempsey MD   975 mg at 02/08/25 0443    Or    acetaminophen (TYLENOL) Suppository 650 mg  650 mg Rectal Q8H Walter Dempsey MD        [Held by provider] allopurinol (ZYLOPRIM) tablet 200 mg  200 mg Oral QPM Walter Dempsey MD   200 mg at 01/26/25 1957    atorvastatin (LIPITOR) tablet 40 mg  40 mg Oral or Feeding Tube At Bedtime Walter Dempsey MD   40 mg at 02/07/25 2122    calcitRIOL (ROCALTROL) capsule 0.25 mcg  0.25 mcg Oral At Bedtime Walter Dempsey MD   0.25 mcg at 02/07/25 2122    cinacalcet (SENSIPAR) tablet 60 mg  60 mg Oral Q Mon Wed Fri AM Walter Dempsey MD   60 mg at 02/07/25 2122    clopidogrel (PLAVIX) tablet 75 mg  75 mg Oral or Feeding Tube QPM Walter Dempsey MD   75 mg at 02/07/25 2122    insulin aspart (NovoLOG) injection (RAPID ACTING)   Subcutaneous TID w/meals Walter Dempsey MD   2 Units at 02/07/25 1508    insulin aspart (NovoLOG) injection (RAPID ACTING)  1-7 Units Subcutaneous TID AC Walter Dempsey MD   1 Units at 02/07/25 0909    insulin aspart (NovoLOG) injection (RAPID ACTING)  1-5 Units Subcutaneous At Bedtime Walter Dempsey MD   1 Units at 02/07/25 0040    insulin glargine (LANTUS PEN) injection 20 Units  20 Units Subcutaneous Daily Enu-Heidi Méndez MD   20 Units at 02/08/25 0907    melatonin tablet 5 mg  5 mg Oral At Bedtime Walter Dempsey MD   5 mg at 02/07/25 2122    metoprolol succinate ER (TOPROL-XL) 24 hr half-tab 12.5 mg  12.5 mg Oral Daily Larry Askew MD   12.5 mg at 02/08/25 0855    midodrine (PROAMATINE) tablet 10 mg  10 mg Oral BID w/meals Walter Dempsey MD   10 mg at 02/08/25 0855    multivitamin RENAL (TRIPHROCAPS) capsule 1  capsule  1 capsule Oral Daily Walter Dempsey MD   1 capsule at 02/08/25 0855    pantoprazole (PROTONIX) 2 mg/mL suspension 40 mg  40 mg Per Feeding Tube [facility] Walter Dempsey MD   40 mg at 02/08/25 0855    Or    pantoprazole (PROTONIX) IV push injection 40 mg  40 mg Intravenous [facility] Walter Dempsey MD   40 mg at 02/07/25 0905    polyethylene glycol (MIRALAX) Packet 17 g  17 g Oral or NG Tube Daily Walter Dempsey MD   17 g at 02/06/25 0839    senna-docusate (SENOKOT-S/PERICOLACE) 8.6-50 MG per tablet 1 tablet  1 tablet Oral or NG Tube BID Walter Dempsey MD   1 tablet at 02/06/25 0838    sevelamer carbonate (RENVELA) tablet 800 mg  800 mg Oral TID w/meals Walter Dempsey MD   800 mg at 02/07/25 1720    sodium chloride (PF) 0.9% PF flush 3 mL  3 mL Intracatheter Q8H Walter Dempsey MD   3 mL at 02/03/25 1237      No Known Allergies         Physical Exam:   Vitals were reviewed   , Blood pressure 108/74, pulse 86, temperature 97.7  F (36.5  C), temperature source Axillary, resp. rate 18, weight 78.1 kg (172 lb 2.9 oz), SpO2 100%.    Wt Readings from Last 3 Encounters:   02/08/25 78.1 kg (172 lb 2.9 oz)   01/21/25 75.8 kg (167 lb)   01/03/25 78.9 kg (174 lb)         GENERAL APPEARANCE: Frail.    RESP: Diminished at bases  CV: RRR  ABDOMEN: soft, NT  EXTREMITIES/SKIN: + edema around thighs         Data:     CBC RESULTS:     Recent Labs   Lab 02/08/25  0543 02/07/25  0607 02/06/25  1309 02/06/25  0558 02/05/25  0532 02/04/25  1417 02/04/25  0454 02/03/25  1959/25  0539   WBC 15.2* 19.7*  --  18.6* 19.3*  --  16.6*  --  15.4*   RBC 2.14* 2.44*  --  2.44* 2.65*  --  2.80*  --  3.02*   HGB 6.7* 7.6* 7.5* 7.6* 8.4* 8.6* 8.6*   < > 9.3*   HCT 20.2* 23.4*  --  23.2* 24.7*  --  25.8*  --  28.4*   * 531*  --  472* 437  --  390  --  376    < > = values in this interval not displayed.       Basic Metabolic Panel:  Recent Labs   Lab 02/08/25  0813 02/08/25  0543 02/07/25  2100 02/07/25  6578  02/07/25  1300 02/07/25  0821 02/07/25  0607 02/06/25  0755 02/06/25  0558 02/05/25  0802 02/05/25  0532 02/04/25  0812 02/04/25  0454 02/03/25  0748 02/03/25  0539   NA  --  130*  --   --   --   --  129*  --  131*  --  129*  --  131*  --  130*   POTASSIUM  --  4.8  --   --   --   --  4.8  --  4.4  --  4.6  --  4.3  --  4.3   CHLORIDE  --  92*  --   --   --   --  91*  --  93*  --  90*  --  95*  --  94*   CO2  --  25  --   --   --   --  23  --  24  --  22  --  21*  --  22   BUN  --  71.2*  --   --   --   --  70.0*  --  71.0*  --  75.6*  --  65.3*  --  61.8*   CR  --  5.87*  --   --   --   --  5.88*  --  6.03*  --  6.55*  --  6.20*  --  6.32*   * 190* 117* 99 108* 159* 187*   < > 202*   < > 250*   < > 238*   < > 207*   TERENCE  --  9.8  --   --   --   --  9.6  --  9.3  --  9.6  --  9.2  --  9.6    < > = values in this interval not displayed.       INR  No lab results found in last 7 days.     Attestation:   I have reviewed today's relevant vital signs, notes, medications, labs and imaging.    Jennifer Jameson MD  [facility] - Nephrology   [phone]

## 2025-02-08 NOTE — PROGRESS NOTES
Cross coverage note: Paged by RN because hemoglobin down from 7.6-6.7; no active bleeding; he is on heparin drip and Plavix, followed by vascular surgery.  He also has end-stage renal disease and getting peritoneal dialysis.  -Defer to daytime team if heparin drip needs to be stopped and orders to transfuse PRBCs given peritoneal dialysis.    Rebeca Abreu, Hospitalist

## 2025-02-08 NOTE — PROGRESS NOTES
[facility]    Medicine Progress Note - Hospitalist Service    Date of Admission:  1/21/2025    Assessment & Plan   Arnulfo Pritchett is a 64 year old male with past medical history of severe heart failure (EF 30%), ESRD (on peritoneal dialysis), chronic paroxysmal AF on warfarin, LLE PAD s/p endarterectomy, RCC s/p nephrectomy admitted on 1/21/2025 for septic shock secondary to right leg PAD with worsening right heel necrotic ulcer. The patient was seen in ED at outside hospital on 1/3/25 and diagnosed with pneumonia, later completing course of Cefdinir. He then presented on 1/21/25 to outside hospital for right leg pain and found to have right leg ulcer and transferred to [facility] for vascular surgery evaluation. Now s/p right common femoral BK popliteal/tibial endarterectomy and right common femoral to below-knee popliteal/tibial PTFE bypass performed on 1/27/25. The patient was admitted to the ICU for requirement of mechanical ventilation and pressor support following surgery for multifactorial shock.      Hospitalist service consulted 1/31/2025 for transfer out of ICU and to assist with medical management along with vascular surgery. Infectious disease also consulted.     Hx PAD of LLE s/p endarterectomy and fem-tibioperoneal trunk bypass on 10/25/24  Hx RLE arterial occlusion s/p SFA angioplasty and stent 5/2024  PAD RLE, right heel gangrene, occluded SFA, no evidence of osteomyelitis  s/p right common femoral and popliteal/tibial endarterectomy, right femoral to popliteal/tibial PTFE bypass 1/27/25   -Found to have critical limb ischemia on admission on January 21, for details see admission note.   -Distributive shock following surgery followed by the ICU service until 1/31.  -Followed by vascular surgery, podiatry, wound care, and ID.  -Coumadin has been held since admission, recently on IV heparin, vascular surgery/podiatry to review when to restart warfarin pending any potential  surgical intervention for worsening ischemic changes in toes of right foot as noted below.  -Has been maintained on statin and Plavix 75 mg daily during this hospital stay.  -Recent IV piperacillin/tazobactam (Zosyn), discontinued 2/2 per ID; follow-up cultures, monitor off antibiotics.  -Any future plans for surgical debridement as per vascular surgery and podiatry.  -Wound care per surgery and WOC.  -pain control, see hospital orders.  -worsening ischemic changes in 2nd-5th toes of right foot.  Podiatry and vascular surgery following.       Multifactorial shock, resolved  ICU admission, weaned off vasopressors 1/30.  -Transitioned to midodrine with increase in prior to admission dose on1/31.  -Monitor blood pressure and heart rate.  -Antibiotics as above.     End-stage renal disease (ESRD), on peritoneal dialysis  Hx of secondary hyperparathyroidism and hyperphosphatemia, phosphorous elevated above baseline of 6 at most recent of 7.9   Nephrology consulted, ongoing follow-up.  Did not require hemodialysis this hospital stay.  -Ongoing peritoneal dialysis, as per nephrology.  -Continue midodrine.     Acute hypoxemic respiratory failure  Right pleural effusion  Status post thoracentesis 1/24/25, repeat 2/2/25  -Following surgery on admission by vascular surgery was intubated.  Recent pneumonia treated with 3 weeks of Ceftin prior to this admission.  No concern for pneumonia per intensivist service.  -Extubated 1/29/25.  -Right pleural effusion.  Status post thoracentesis1/24/25 with 1.5 L clear yellow fluid removed, likely transudative based on low cell count of 117, , and protein of 1.7. Possibly secondary to reduced peritoneal dialysis during hospitalization vs underlying severe HFrEF (30%).  -CT chest 2/1/25, see report, no clear pneumonia; large right pleural effusion noted.  -Wean down oxygen as able, encourage incentive spirometry.  -Antibiotics as above, discontinued by ID 2/2.  -ID consulted 2/1 as  above, ongoing follow-up.  -S/p repeat Right thoracentesis on 2/2 with 2.3 L of rolan fluid removed; additional studies, cultures (negative to date), cytology (negative for malignancy)     Paroxysmal atrial fibrillation  Chronic anticoagulation prior to admission, warfarin - ON HOLD  Ischemic cardiomyopathy with HLD, CAD s/p multiple stents, and severe EFrEF (30%)   Wide complex tachycardia 2/1/25  Assessment-postoperatively has been maintained on heparin drip.  Coumadin has been held during his hospital stay.  -Echo 1/31-EF 25 to 30%.  Severe global hypokinesis with abnormal septal motion consistent with conduction abnormality.  Severe inferior wall hypokinesis.  LVH.  RV mildly dilated and mildly reduced RV function.  Mild mitral regurgitation.  Mild to moderate mitral stenosis.  Moderate aortic stenosis.  Left ventricular fairly pressures elevated.  -Episode of wide-complex tachycardia noted by nursing staff, 15 beats, up on 2/1/2025.  -Continue inpatient telemetry.  -Consider follow-up cardiology consultation if recurrent wide-complex tachycardia in the setting of left ventricular dysfunction.  -Restarted prior to admission beta-blocker, Toprol XL, at adjusted dose, monitoring heart rate and blood pressure and consider titrating back up to PTA dose as indicated.  -Continue IV heparin anticoagulation, with future transition to oral Coumadin as prior to admission when appropriate from vascular surgery/podiatry standpoint.        Anemia of chronic disease  Baseline around 10.  Has been stable in the 8 range.  No signs of bleeding.  -Hemoglobin trended down to 6.7 today.  No signs of active bleed.  -Will transfuse 1 unit PRBC  -Nephrology has ordered 20,000 units of EPO today and to continue weekly.  -Continue to monitor hemoglobin every 8 hours      Hyperglycemia  Type 2 diabetes   hemoglobin A1c 5.7% October 24  On insulin prior to admission.  - increase lantus from 15U to 20U on 2/6/25 for optimal   -Continue  insulin, adjust as needed.  -Monitor for hypoglycemia.  - continue to monitor BG    Mixed anion gap acidosis  -Improved respiratory function, monitor.  Anion gap resolved 1/31.     Abdominal discomfort 1/30, resolved  Noted to have some right upper quadrant pain 1/30.  Now resolved.  LFTs normal.  Was on Zosyn for above surgical issues.   -Monitor abdominal exam.  -Antibiotics managed as noted above.    Moderate protein calorie nutrition  -Nutrition consulted.  -Speech and language therapy (SLP) consulted, diet per speech therapy.     Gout  -PTA allopurinol currently on hold, reassess daily.     History of renal cell carcinoma   -Status post right nephrectomy, monitor.     History of obstructive sleep apnea.  By report, intolerant of CPAP   -Monitor, follow-up with outpatient provider.    Weakness and deconditioning  -PT, OT, speech and language therapy (SLP) consulted.  -Increase activity as tolerated.    Disposition  Anticipated discharge timing see Disposition Plan below.  -Anticipated discharge to transitional care unit.  -Social work consulted for discharge planning.          Diet: Snacks/Supplements Adult: Expedite Cup; Between Meals  Snacks/Supplements Adult: Nepro Oral Supplement; Between Meals  Combination Diet Soft and Bite Sized Diet (level 6); Thin Liquids (level 0) (Sit at 90 degrees, alternate textures)    DVT Prophylaxis: IV heparin  Rosario Catheter: Not present  Lines: None     Cardiac Monitoring: ACTIVE order. [place] [facility] [place] Status: Full Code      Clinically Significant Risk Factors         # Hyponatremia: Lowest Na = 129 mmol/L in last 2 days, will monitor as appropriate  # Hypochloremia: Lowest Cl = 91 mmol/L in last 2 days, will monitor as appropriate      # Hypoalbuminemia: Lowest albumin = 1.8 g/dL at 1/30/2025  3:17 PM, will monitor as appropriate     # Hypertension: Noted on problem list    # Chronic heart failure with reduced ejection fraction: last echo with EF <40%          # DMII:  A1C = 6.6 % (Ref range: <5.7 %) within past 6 months    # Severe Malnutrition: based on nutrition assessment      # Financial/Environmental Concerns:           Social Drivers of Health    Tobacco Use: Medium Risk (1/27/2025)    Patient History     Smoking Tobacco Use: Former     Smokeless Tobacco Use: Never   Alcohol Use: Unknown (11/28/2018)    Received from [facility], [facility] and [facility]    AUDIT-C     Frequency of Alcohol Consumption: Monthly or less     Frequency of Binge Drinking: Never   Transportation Needs: High Risk (1/21/2025)    Transportation Needs     Within the past 12 months, has lack of transportation kept you from medical appointments, getting your medicines, non-medical meetings or appointments, work, or from getting things that you need?: Yes   Physical Activity: Unknown (10/8/2024)    Exercise Vital Sign     Days of Exercise per Week: 0 days   Social Connections: Unknown (10/8/2024)    Social Connection and Isolation Panel [NHANES]     Frequency of Social Gatherings with Friends and Family: Patient declined          Disposition Plan     Medically Ready for Discharge: Anticipated in 2-4 Days, unclear pending vascular surgery/podiatry recommendations regarding worsening ischemic changes of toes in right foot; transition off IV heparin back to warfarin; TCU bed availability.         Heidi Chang MD  Hospitalist Service  [facility]  Securely message with the grafter (more info)  Text page via [employer] Paging/Directory   ______________________________________________________________________    Interval History   Hemoglobin dropped this morning, no obvious or active bleed.  Will transfuse 1 unit of PRBC.  Continue heparin drip  Patient quite lethargic this morning.    Physical Exam   Vital Signs: Temp: 97.7  F (36.5  C) Temp src: Axillary BP: 108/71 Pulse: 93   Resp: 22 SpO2: 100 % O2 Device: Nasal cannula Oxygen Delivery:  1/2 LPM  Weight: 172 lbs 2.87 oz    General Appearance: Well appearing for stated age.  Respiratory: CTAB, no rales or ronchi  Cardiovascular: S1, S2 normal, no murmurs  GI: non-tender on palpation, BS present      Medical Decision Making       40 MINUTES SPENT BY ME on the date of service doing chart review, history, exam, documentation & further activities per the note.      Data     I have personally reviewed the following data over the past 24 hrs:    15.2 (H)  \   6.7 (LL)   / 488 (H)     130 (L) 92 (L) 71.2 (H) /  172 (H)   4.8 25 5.87 (H) \       Imaging results reviewed over the past 24 hrs:   No results found for this or any previous visit (from the past 24 hours).

## 2025-02-08 NOTE — PROGRESS NOTES
VASCULAR SURGERY PROGRESS NOTE    Subjective:  NAEON. Comfortable appearing in bed. Discussed with patient that he has two options for how to proceed - BKA vs further debridements with podiatry and antibiotics. We discussed with him that he is likely to have a prolonged healing course due to the nature of his infection. He needs more time to decide.    Objective:  Intake/Output Summary (Last 24 hours) at 2/5/2025 0813  Last data filed at 2/5/2025 0600  Gross per 24 hour   Intake 200 ml   Output 155 ml   Net 45 ml     PHYSICAL EXAM:  /77 (BP Location: Right arm, Patient Position: Semi-Soto's)   Pulse 105   Temp 97.9  F (36.6  C) (Oral)   Resp 26   Wt 78.1 kg (172 lb 2.9 oz)   SpO2 100%   BMI 22.72 kg/m    Alert and oriented x4, neurologically intact  Nonlabored breathing, on 0.5L NC  Abdomen remains soft, nondistended, nontender, groin incision intact, incisions intact  R lower extremity has monophasic(strong) AT signal with slightly weaker PT   groin site remains soft, mildly tender, hematoma present    WBC: 15.2 (19.7)  Hgb: 6.7 (7.6)    ASSESSMENT:  64 year old male who is POD11 s/p R fem-BK pop bypass with PTFE and proximal short GSV interposition graft. Out of ICU in [facility] [place]. Worsening R pleural effusion of unclear etiology despite thoracentesis, most recent thoracentesis on 2/2    PLAN:  - NGTD from cultures on 2/2  - oob, physical therapy  - ID recs repeat blood cultures and observing pt after 12 day course of antibiotics now finished, suspect right thigh hematoma for ongoing leukocytosis, improved today  - podiatry recs offloading rooke boot at all times and observing demarcation of toes for further planning  - plavix, heparin drip  - speech involved  - appreciate all teams involved in patient's care    Patient seen and examined with vascular surgery staff.    Milo Carrion MD  Vascular Surgery

## 2025-02-08 NOTE — PLAN OF CARE
Goal Outcome Evaluation:    0700-1930    Orientations: A/O x4  Vitals/Pain: Tachycardic otherwise VSS on 1/2L via NC for comfort. LS are dim. Infrequent weak cough. Pain managed w/ PRN robaxin x2.  Tele: ST  Lines/Drains: PIV x2 infusing heparin gtt at 1450 units/hr. PD site, dressing CDI.   Skin/Wounds:  RLE incision sites, dressings CDI. R toes ischemic. R heel wound, dressing changed, CDI.  GI/: PT on PD, occasionally voids spontaneously. No BM during shift, BS audible, passing flatus, pt refusing BM regimen meds   Labs: Abnormal/Trends, Electrolyte Replacement- Hemoglobin 6.7, transfused 1 unit PRBC, recheck pending. Trending hemoglobin q8h.   Ambulation/Assist: Assist x2 turn and repo q2h. Pt refusing OOB activity  Sleep Quality: Poor per pt

## 2025-02-09 LAB
ANION GAP SERPL CALCULATED.3IONS-SCNC: 15 MMOL/L (ref 7–15)
BUN SERPL-MCNC: 71.2 MG/DL (ref 8–23)
CALCIUM SERPL-MCNC: 9.8 MG/DL (ref 8.8–10.4)
CHLORIDE SERPL-SCNC: 92 MMOL/L (ref 98–107)
CREAT SERPL-MCNC: 5.71 MG/DL (ref 0.67–1.17)
EGFRCR SERPLBLD CKD-EPI 2021: 10 ML/MIN/1.73M2
ERYTHROCYTE [DISTWIDTH] IN BLOOD BY AUTOMATED COUNT: 21.2 % (ref 10–15)
GLUCOSE BLDC GLUCOMTR-MCNC: 104 MG/DL (ref 70–99)
GLUCOSE BLDC GLUCOMTR-MCNC: 135 MG/DL (ref 70–99)
GLUCOSE BLDC GLUCOMTR-MCNC: 154 MG/DL (ref 70–99)
GLUCOSE BLDC GLUCOMTR-MCNC: 158 MG/DL (ref 70–99)
GLUCOSE BLDC GLUCOMTR-MCNC: 55 MG/DL (ref 70–99)
GLUCOSE BLDC GLUCOMTR-MCNC: 96 MG/DL (ref 70–99)
GLUCOSE SERPL-MCNC: 227 MG/DL (ref 70–99)
HCO3 SERPL-SCNC: 25 MMOL/L (ref 22–29)
HCT VFR BLD AUTO: 25.3 % (ref 40–53)
HGB BLD-MCNC: 7.9 G/DL (ref 13.3–17.7)
HGB BLD-MCNC: 8.1 G/DL (ref 13.3–17.7)
HGB BLD-MCNC: 8.2 G/DL (ref 13.3–17.7)
HGB BLD-MCNC: 8.2 G/DL (ref 13.3–17.7)
MAGNESIUM SERPL-MCNC: 2 MG/DL (ref 1.7–2.3)
MCH RBC QN AUTO: 29.8 PG (ref 26.5–33)
MCHC RBC AUTO-ENTMCNC: 32.4 G/DL (ref 31.5–36.5)
MCV RBC AUTO: 92 FL (ref 78–100)
PHOSPHATE SERPL-MCNC: 5.4 MG/DL (ref 2.5–4.5)
PLATELET # BLD AUTO: 560 10E3/UL (ref 150–450)
POTASSIUM SERPL-SCNC: 5.1 MMOL/L (ref 3.4–5.3)
RBC # BLD AUTO: 2.75 10E6/UL (ref 4.4–5.9)
SODIUM SERPL-SCNC: 132 MMOL/L (ref 135–145)
UFH PPP CHRO-ACNC: 0.3 IU/ML
WBC # BLD AUTO: 15.2 10E3/UL (ref 4–11)

## 2025-02-09 PROCEDURE — 85018 HEMOGLOBIN: CPT | Performed by: INTERNAL MEDICINE

## 2025-02-09 PROCEDURE — 84100 ASSAY OF PHOSPHORUS: CPT | Performed by: INTERNAL MEDICINE

## 2025-02-09 PROCEDURE — 120N000013 HC R&B IMCU

## 2025-02-09 PROCEDURE — 250N000013 HC RX MED GY IP 250 OP 250 PS 637: Performed by: INTERNAL MEDICINE

## 2025-02-09 PROCEDURE — 80048 BASIC METABOLIC PNL TOTAL CA: CPT | Performed by: INTERNAL MEDICINE

## 2025-02-09 PROCEDURE — 36415 COLL VENOUS BLD VENIPUNCTURE: CPT | Performed by: STUDENT IN AN ORGANIZED HEALTH CARE EDUCATION/TRAINING PROGRAM

## 2025-02-09 PROCEDURE — 90945 DIALYSIS ONE EVALUATION: CPT | Performed by: INTERNAL MEDICINE

## 2025-02-09 PROCEDURE — 82310 ASSAY OF CALCIUM: CPT | Performed by: INTERNAL MEDICINE

## 2025-02-09 PROCEDURE — 82374 ASSAY BLOOD CARBON DIOXIDE: CPT | Performed by: INTERNAL MEDICINE

## 2025-02-09 PROCEDURE — 634N000001 HC RX 634: Mod: JZ | Performed by: INTERNAL MEDICINE

## 2025-02-09 PROCEDURE — 250N000011 HC RX IP 250 OP 636: Performed by: INTERNAL MEDICINE

## 2025-02-09 PROCEDURE — 90945 DIALYSIS ONE EVALUATION: CPT

## 2025-02-09 PROCEDURE — 250N000013 HC RX MED GY IP 250 OP 250 PS 637: Performed by: HOSPITALIST

## 2025-02-09 PROCEDURE — 83735 ASSAY OF MAGNESIUM: CPT | Performed by: INTERNAL MEDICINE

## 2025-02-09 PROCEDURE — 85018 HEMOGLOBIN: CPT | Performed by: HOSPITALIST

## 2025-02-09 PROCEDURE — 36415 COLL VENOUS BLD VENIPUNCTURE: CPT | Performed by: HOSPITALIST

## 2025-02-09 PROCEDURE — 99232 SBSQ HOSP IP/OBS MODERATE 35: CPT | Performed by: HOSPITALIST

## 2025-02-09 PROCEDURE — 85520 HEPARIN ASSAY: CPT | Performed by: STUDENT IN AN ORGANIZED HEALTH CARE EDUCATION/TRAINING PROGRAM

## 2025-02-09 PROCEDURE — 250N000013 HC RX MED GY IP 250 OP 250 PS 637

## 2025-02-09 RX ORDER — GENTAMICIN SULFATE 1 MG/G
CREAM TOPICAL DAILY PRN
Status: DISCONTINUED | OUTPATIENT
Start: 2025-02-09 | End: 2025-02-09

## 2025-02-09 RX ADMIN — SEVELAMER CARBONATE 800 MG: 800 TABLET, FILM COATED ORAL at 11:00

## 2025-02-09 RX ADMIN — ACETAMINOPHEN 975 MG: 325 TABLET, FILM COATED ORAL at 21:07

## 2025-02-09 RX ADMIN — Medication 1 CAPSULE: at 09:35

## 2025-02-09 RX ADMIN — CLOPIDOGREL BISULFATE 75 MG: 75 TABLET ORAL at 21:08

## 2025-02-09 RX ADMIN — ATORVASTATIN CALCIUM 40 MG: 40 TABLET, FILM COATED ORAL at 21:08

## 2025-02-09 RX ADMIN — HEPARIN SODIUM 1450 UNITS/HR: 10000 INJECTION, SOLUTION INTRAVENOUS at 13:27

## 2025-02-09 RX ADMIN — EPOETIN ALFA-EPBX 20000 UNITS: 10000 INJECTION, SOLUTION INTRAVENOUS; SUBCUTANEOUS at 17:39

## 2025-02-09 RX ADMIN — MIDODRINE HYDROCHLORIDE 10 MG: 2.5 TABLET ORAL at 09:34

## 2025-02-09 RX ADMIN — SEVELAMER CARBONATE 800 MG: 800 TABLET, FILM COATED ORAL at 15:51

## 2025-02-09 RX ADMIN — INSULIN GLARGINE 20 UNITS: 100 INJECTION, SOLUTION SUBCUTANEOUS at 09:40

## 2025-02-09 RX ADMIN — CALCITRIOL CAPSULES 0.25 MCG 0.25 MCG: 0.25 CAPSULE ORAL at 21:08

## 2025-02-09 RX ADMIN — ACETAMINOPHEN 975 MG: 325 TABLET, FILM COATED ORAL at 04:12

## 2025-02-09 RX ADMIN — PANTOPRAZOLE SODIUM 40 MG: 40 TABLET, DELAYED RELEASE ORAL at 09:35

## 2025-02-09 RX ADMIN — SEVELAMER CARBONATE 800 MG: 800 TABLET, FILM COATED ORAL at 18:37

## 2025-02-09 RX ADMIN — SENNOSIDES AND DOCUSATE SODIUM 1 TABLET: 50; 8.6 TABLET ORAL at 21:08

## 2025-02-09 RX ADMIN — Medication 5 MG: at 21:08

## 2025-02-09 RX ADMIN — ACETAMINOPHEN 975 MG: 325 TABLET, FILM COATED ORAL at 12:06

## 2025-02-09 RX ADMIN — Medication 12.5 MG: at 09:35

## 2025-02-09 RX ADMIN — MIDODRINE HYDROCHLORIDE 10 MG: 2.5 TABLET ORAL at 18:37

## 2025-02-09 ASSESSMENT — ACTIVITIES OF DAILY LIVING (ADL)
ADLS_ACUITY_SCORE: 75
ADLS_ACUITY_SCORE: 71
ADLS_ACUITY_SCORE: 75
ADLS_ACUITY_SCORE: 71
ADLS_ACUITY_SCORE: 75
ADLS_ACUITY_SCORE: 71
ADLS_ACUITY_SCORE: 75
ADLS_ACUITY_SCORE: 67
ADLS_ACUITY_SCORE: 71
ADLS_ACUITY_SCORE: 75
ADLS_ACUITY_SCORE: 75
ADLS_ACUITY_SCORE: 71
ADLS_ACUITY_SCORE: 75
ADLS_ACUITY_SCORE: 67
ADLS_ACUITY_SCORE: 67
ADLS_ACUITY_SCORE: 75

## 2025-02-09 NOTE — PROVIDER NOTIFICATION
"2/9/25 8261 writer spoke with Dr. Gray with vascular surgery, verbal order that pt \"can be discontinued from IMC and keep telemetry\".   "

## 2025-02-09 NOTE — PROGRESS NOTES
VASCULAR SURGERY PROGRESS NOTE    Subjective:  MAYRAJAMISON. Discussed again with patient regarding amputation vs attempted limb salvage and he elected for debridements with podiatry in an attempt for limb salvage.    Objective:  Intake/Output Summary (Last 24 hours) at 2/5/2025 0813  Last data filed at 2/5/2025 0600  Gross per 24 hour   Intake 200 ml   Output 155 ml   Net 45 ml     PHYSICAL EXAM:  /86   Pulse 101   Temp 97.4  F (36.3  C) (Oral)   Resp 25   Wt 74.9 kg (165 lb 2 oz)   SpO2 100%   BMI 21.79 kg/m    Alert and oriented x4, neurologically intact  Nonlabored breathing, on 0.5L NC  Abdomen remains soft, nondistended, nontender, groin incision intact, incisions intact  R lower extremity has monophasic(strong) AT signal with slightly weaker PT   groin site remains soft, mildly tender, hematoma present    WBC: 15.2 (15.2)  Hgb: 8.2 (6.7)    ASSESSMENT:  64 year old male who is POD12 s/p R fem-BK pop bypass with PTFE and proximal short GSV interposition graft. Out of ICU in [facility] [place]. Worsening R pleural effusion of unclear etiology despite thoracentesis, most recent thoracentesis on 2/2. Patient opting for attempted limb salvage rather than amputation.    PLAN:  - Ok to transfer to floor with tele from our perspective  - NGTD from cultures on 2/2  - oob, physical therapy  - ID recs repeat blood cultures and observing pt after 12 day course of antibiotics now finished, suspect right thigh hematoma for ongoing leukocytosis  - podiatry recs offloading rooke boot at all times and observing demarcation of toes for further planning. Will discuss with them that patient is hoping for debridement in attempt to salvage leg  - plavix, heparin drip  - speech involved  - appreciate all teams involved in patient's care    Patient seen and examined with vascular surgery staff.    Milo Carrion MD  Vascular Surgery

## 2025-02-09 NOTE — PLAN OF CARE
A/Ox4. VSS ex tachycardic. Tele SR/ST. LS diminished. +bs, +flatus, +bm. Voiding small amounts. On peritoneal dialysis, dressing changed. Incisions to RLE NICO. Right groin/hip with +2-3 edema, firm and tender. Right foot with ischemic toes. Numbness presents. Foot is also tender. Dressing to right heel and shin cdi. Tolerating diet. Up with lift. Turn and repo q2hr. Refusing repositioning at times. Managing pain with scheduled tylenol. Trending hgb. Hep gtt infusing.

## 2025-02-09 NOTE — PROGRESS NOTES
Renal Medicine Progress Note            Assessment/Plan:     Arnulfo Pritchett is a 64 year old male CAD, HTN, HLD, pafib, HFrEF (EF 20-25%), ESRD on PD, DM2, TALI, RCC s/p R nephrectomy (2022), PAD with multiple LE procedures      # End stage renal disease on PD  Chronic PD for last 3.5 years.  Home unit [facility], nephrologist Dr. May  Rx: 5 cycles, 2 L fill volumes, 9 hours, last fill 1.2 L with external.     # Moderate R pleural effusion: Status post thoracentesis on 1/24 and 2/2.  Transudative  Chest x-ray on 2/5 shows small layering effusion on right side     # PAD, right heel gangrene, occluded right SFA.  Status post rt fem - below knee popliteal bypass this admission    # Postsurgical anemia in patient with ESRD-  -status post blood transfusion.  Hemoglobin stable at 8.2  -EPO 20,000 units today and continue weekly                              Other issues-  Diabetes mellitus  Chronic paroxysmal atrial fibrillation  Secondary hyperparathyroidism and hyperphosphatemia  Severe heart failure with EF of 30% and mild RV dysfunction.      Discussion/plan:  Patient reports he is tired of doing PD and does not think he is thriving on it.  He wants to know if hemodialysis is going to be an option.  If not, he reports he has been thinking about pursuing palliative care, but would like to try hemodialysis first.    He was on hemodialysis before.  Unclear why it was switched to PD.  Low he is very frail.  I discussed with him that hemodialysis will still be stressful to his body and he may not do too much better than he is doing on PD.  I would want to get an opinion from his outpatient nephrologist before placing tunneled catheter.  I  placed a call out to his primary nephrologist to discuss his options further.  Waiting for callback .  Day team to attempt again tomorrow  Continue PD for now        Pt has not been getting extraneal here. UF is about 700 ml with 2.5% and around 1600 cc with mix of 4.5% -  2.5%    #  PD order placed.   2.5% , 2 L fill x 5 over 9 hours .  We have been alternating it with mix of 4.5% and 2.5% to get adequate UF.            Interval History:     Following for PD management.  No issues with PD overnight.  Had dialysis with mix of 4.5 and 2.5% dialysate overnight.  Total UF of around 1900 cc  No new complaints           Medications and Allergies:     Current Facility-Administered Medications   Medication Dose Route Frequency Provider Last Rate Last Admin    - MEDICATION INSTRUCTIONS for Dialysis Patients -   Does not apply See Admin Instructions Walter Dempsey MD        acetaminophen (TYLENOL) tablet 975 mg  975 mg Oral or Feeding Tube Q8H Walter Dempsey MD   975 mg at 02/09/25 1206    Or    acetaminophen (TYLENOL) Suppository 650 mg  650 mg Rectal Q8H Walter Dempsey MD        [Held by provider] allopurinol (ZYLOPRIM) tablet 200 mg  200 mg Oral QPM Walter Dempsey MD   200 mg at 01/26/25 1957    atorvastatin (LIPITOR) tablet 40 mg  40 mg Oral or Feeding Tube At Bedtime Walter Dempsey MD   40 mg at 02/08/25 2202    calcitRIOL (ROCALTROL) capsule 0.25 mcg  0.25 mcg Oral At Bedtime Walter Dempsey MD   0.25 mcg at 02/08/25 2202    cinacalcet (SENSIPAR) tablet 60 mg  60 mg Oral Q Mon Wed Fri AM Walter Dempsey MD   60 mg at 02/07/25 2122    clopidogrel (PLAVIX) tablet 75 mg  75 mg Oral or Feeding Tube QPM Walter Dempsey MD   75 mg at 02/08/25 2202    insulin aspart (NovoLOG) injection (RAPID ACTING)   Subcutaneous TID w/meals Walter Dempsey MD   1 Units at 02/09/25 1101    insulin aspart (NovoLOG) injection (RAPID ACTING)  1-7 Units Subcutaneous TID AC Walter Dempsey MD   1 Units at 02/09/25 1101    insulin aspart (NovoLOG) injection (RAPID ACTING)  1-5 Units Subcutaneous At Bedtime Walter Dempsey MD   1 Units at 02/07/25 0040    insulin glargine (LANTUS PEN) injection 20 Units  20 Units Subcutaneous Daily Enu-Heidi Méndez MD   20 Units at 02/09/25  0940    melatonin tablet 5 mg  5 mg Oral At Bedtime Walter Dempsey MD   5 mg at 02/08/25 2202    metoprolol succinate ER (TOPROL-XL) 24 hr half-tab 12.5 mg  12.5 mg Oral Daily Larry Askew MD   12.5 mg at 02/09/25 0935    midodrine (PROAMATINE) tablet 10 mg  10 mg Oral BID w/meals Walter Dempsey MD   10 mg at 02/09/25 0934    multivitamin RENAL (TRIPHROCAPS) capsule 1 capsule  1 capsule Oral Daily Watler Dempsey MD   1 capsule at 02/09/25 0935    pantoprazole (PROTONIX) EC tablet 40 mg  40 mg Oral QAM AC Carolina Alejandre, NAOMI   40 mg at 02/09/25 0935    polyethylene glycol (MIRALAX) Packet 17 g  17 g Oral or NG Tube Daily Walter Dempsey MD   17 g at 02/06/25 0839    senna-docusate (SENOKOT-S/PERICOLACE) 8.6-50 MG per tablet 1 tablet  1 tablet Oral or NG Tube BID Walter Dempsey MD   1 tablet at 02/06/25 0838    sevelamer carbonate (RENVELA) tablet 800 mg  800 mg Oral TID w/meals Walter Dempsey MD   800 mg at 02/09/25 1100    sodium chloride (PF) 0.9% PF flush 3 mL  3 mL Intracatheter Q8H Walter Dempsey MD   3 mL at 02/08/25 2204      No Known Allergies         Physical Exam:   Vitals were reviewed   , Blood pressure 117/86, pulse 101, temperature 97.4  F (36.3  C), temperature source Oral, resp. rate 25, weight 74.9 kg (165 lb 2 oz), SpO2 100%.    Wt Readings from Last 3 Encounters:   02/09/25 74.9 kg (165 lb 2 oz)   01/21/25 75.8 kg (167 lb)   01/03/25 78.9 kg (174 lb)         GENERAL APPEARANCE: Frail.    RESP: Diminished at bases  CV: RRR  ABDOMEN: soft, NT  EXTREMITIES/SKIN: + edema around thighs         Data:     CBC RESULTS:     Recent Labs   Lab 02/09/25  0656 02/08/25  2238 02/08/25  1914 02/08/25  0543 02/07/25  0607 02/06/25  1309 02/06/25  0558 02/05/25  0532 02/04/25  1417 02/04/25  0454   WBC 15.2*  --   --  15.2* 19.7*  --  18.6* 19.3*  --  16.6*   RBC 2.75*  --   --  2.14* 2.44*  --  2.44* 2.65*  --  2.80*   HGB 8.2*  8.2* 8.0* 8.1* 6.7* 7.6* 7.5* 7.6* 8.4*   < >  8.6*   HCT 25.3*  --   --  20.2* 23.4*  --  23.2* 24.7*  --  25.8*   *  --   --  488* 531*  --  472* 437  --  390    < > = values in this interval not displayed.       Basic Metabolic Panel:  Recent Labs   Lab 02/09/25  1145 02/09/25  0756 02/09/25  0656 02/08/25  2140 02/08/25  1654 02/08/25  1216 02/08/25  0813 02/08/25  0543 02/07/25  0821 02/07/25  0607 02/06/25  0755 02/06/25  0558 02/05/25  0802 02/05/25  0532 02/04/25  0812 02/04/25  0454   NA  --   --  132*  --   --   --   --  130*  --  129*  --  131*  --  129*  --  131*   POTASSIUM  --   --  5.1  --   --   --   --  4.8  --  4.8  --  4.4  --  4.6  --  4.3   CHLORIDE  --   --  92*  --   --   --   --  92*  --  91*  --  93*  --  90*  --  95*   CO2  --   --  25  --   --   --   --  25  --  23  --  24  --  22  --  21*   BUN  --   --  71.2*  --   --   --   --  71.2*  --  70.0*  --  71.0*  --  75.6*  --  65.3*   CR  --   --  5.71*  --   --   --   --  5.87*  --  5.88*  --  6.03*  --  6.55*  --  6.20*   * 158* 227* 108* 128* 172*   < > 190*   < > 187*   < > 202*   < > 250*   < > 238*   TERENCE  --   --  9.8  --   --   --   --  9.8  --  9.6  --  9.3  --  9.6  --  9.2    < > = values in this interval not displayed.       INR  No lab results found in last 7 days.     Attestation:   I have reviewed today's relevant vital signs, notes, medications, labs and imaging.    Jennifer Jameson MD  [facility] - Nephrology   [phone]

## 2025-02-09 NOTE — PLAN OF CARE
Goal Outcome Evaluation:    [id number]. Pt transferred to Gen Surg. Handoff given to RN.     Orientations: A/O x4, flat affect.  Vitals/Pain: BP's soft and tachycardic otherwise VSS on 1/2 L via NC for comfort. LS are dim. MORTENSEN. Pain managed w/ scheduled tylenol.   Tele: SR/ST  Lines/Drains: PIV x2 infusing heparin gtt at 1450 units/hr.  Skin/Wounds: RLE incision sites, WDL/CDI. R heel PI, black, dressing CDI, wound care completed. R toes ischemic. CMS intact other than tenderness on right foot.   GI/: Pt on PD. BM x1 during shift, passing flatus.   Labs: Abnormal/Trends, Electrolyte Replacement- hbg q8h checks 8.0, 8.2. Hep Xa 0.3, next recheck in am.   Ambulation/Assist: Assist x2 turn and repo q2h. Pt refusing repos at times, writer educated pt on importance of turns to prevent skin breakdown. Pt refusing OOB activty, educated on importance for strength.  Sleep Quality: Poor per pt. Pt educated on proper sleep/awake cycles for better sleep hygiene  Plan: Plan for PD run tonight

## 2025-02-10 ENCOUNTER — APPOINTMENT (OUTPATIENT)
Dept: SPEECH THERAPY | Facility: CLINIC | Age: 65
DRG: 853 | End: 2025-02-10
Attending: INTERNAL MEDICINE
Payer: MEDICARE

## 2025-02-10 LAB
ANION GAP SERPL CALCULATED.3IONS-SCNC: 14 MMOL/L (ref 7–15)
BACTERIA BLD CULT: NO GROWTH
BACTERIA BLD CULT: NO GROWTH
BUN SERPL-MCNC: 70.4 MG/DL (ref 8–23)
CALCIUM SERPL-MCNC: 10 MG/DL (ref 8.8–10.4)
CHLORIDE SERPL-SCNC: 94 MMOL/L (ref 98–107)
CREAT SERPL-MCNC: 5.94 MG/DL (ref 0.67–1.17)
EGFRCR SERPLBLD CKD-EPI 2021: 10 ML/MIN/1.73M2
GLUCOSE BLDC GLUCOMTR-MCNC: 108 MG/DL (ref 70–99)
GLUCOSE BLDC GLUCOMTR-MCNC: 109 MG/DL (ref 70–99)
GLUCOSE BLDC GLUCOMTR-MCNC: 144 MG/DL (ref 70–99)
GLUCOSE BLDC GLUCOMTR-MCNC: 153 MG/DL (ref 70–99)
GLUCOSE BLDC GLUCOMTR-MCNC: 83 MG/DL (ref 70–99)
GLUCOSE SERPL-MCNC: 137 MG/DL (ref 70–99)
HCO3 SERPL-SCNC: 26 MMOL/L (ref 22–29)
HGB BLD-MCNC: 7.6 G/DL (ref 13.3–17.7)
MAGNESIUM SERPL-MCNC: 1.9 MG/DL (ref 1.7–2.3)
PHOSPHATE SERPL-MCNC: 5.9 MG/DL (ref 2.5–4.5)
POTASSIUM SERPL-SCNC: 5.1 MMOL/L (ref 3.4–5.3)
SODIUM SERPL-SCNC: 134 MMOL/L (ref 135–145)
UFH PPP CHRO-ACNC: 0.33 IU/ML

## 2025-02-10 PROCEDURE — 92526 ORAL FUNCTION THERAPY: CPT | Mod: GN

## 2025-02-10 PROCEDURE — 84100 ASSAY OF PHOSPHORUS: CPT | Performed by: INTERNAL MEDICINE

## 2025-02-10 PROCEDURE — 36415 COLL VENOUS BLD VENIPUNCTURE: CPT | Performed by: HOSPITALIST

## 2025-02-10 PROCEDURE — 83735 ASSAY OF MAGNESIUM: CPT | Performed by: INTERNAL MEDICINE

## 2025-02-10 PROCEDURE — 250N000013 HC RX MED GY IP 250 OP 250 PS 637: Performed by: INTERNAL MEDICINE

## 2025-02-10 PROCEDURE — 85520 HEPARIN ASSAY: CPT | Performed by: HOSPITALIST

## 2025-02-10 PROCEDURE — 120N000013 HC R&B IMCU

## 2025-02-10 PROCEDURE — 80048 BASIC METABOLIC PNL TOTAL CA: CPT | Performed by: INTERNAL MEDICINE

## 2025-02-10 PROCEDURE — 90945 DIALYSIS ONE EVALUATION: CPT | Performed by: INTERNAL MEDICINE

## 2025-02-10 PROCEDURE — 250N000013 HC RX MED GY IP 250 OP 250 PS 637

## 2025-02-10 PROCEDURE — 82310 ASSAY OF CALCIUM: CPT | Performed by: INTERNAL MEDICINE

## 2025-02-10 PROCEDURE — 85018 HEMOGLOBIN: CPT | Performed by: HOSPITALIST

## 2025-02-10 PROCEDURE — 99232 SBSQ HOSP IP/OBS MODERATE 35: CPT | Mod: 57 | Performed by: PODIATRIST

## 2025-02-10 PROCEDURE — 99233 SBSQ HOSP IP/OBS HIGH 50: CPT | Performed by: HOSPITALIST

## 2025-02-10 PROCEDURE — 250N000013 HC RX MED GY IP 250 OP 250 PS 637: Performed by: HOSPITALIST

## 2025-02-10 PROCEDURE — 99024 POSTOP FOLLOW-UP VISIT: CPT

## 2025-02-10 PROCEDURE — 90945 DIALYSIS ONE EVALUATION: CPT

## 2025-02-10 PROCEDURE — 250N000011 HC RX IP 250 OP 636: Performed by: INTERNAL MEDICINE

## 2025-02-10 RX ORDER — GENTAMICIN SULFATE 1 MG/G
CREAM TOPICAL DAILY PRN
Status: DISCONTINUED | OUTPATIENT
Start: 2025-02-10 | End: 2025-02-11

## 2025-02-10 RX ADMIN — MIDODRINE HYDROCHLORIDE 10 MG: 2.5 TABLET ORAL at 10:36

## 2025-02-10 RX ADMIN — ACETAMINOPHEN 975 MG: 325 TABLET, FILM COATED ORAL at 13:19

## 2025-02-10 RX ADMIN — ATORVASTATIN CALCIUM 40 MG: 40 TABLET, FILM COATED ORAL at 20:35

## 2025-02-10 RX ADMIN — Medication 12.5 MG: at 10:35

## 2025-02-10 RX ADMIN — SEVELAMER CARBONATE 800 MG: 800 TABLET, FILM COATED ORAL at 18:36

## 2025-02-10 RX ADMIN — INSULIN GLARGINE 20 UNITS: 100 INJECTION, SOLUTION SUBCUTANEOUS at 10:35

## 2025-02-10 RX ADMIN — Medication 5 MG: at 20:35

## 2025-02-10 RX ADMIN — CLOPIDOGREL BISULFATE 75 MG: 75 TABLET ORAL at 20:35

## 2025-02-10 RX ADMIN — HEPARIN SODIUM 1450 UNITS/HR: 10000 INJECTION, SOLUTION INTRAVENOUS at 08:33

## 2025-02-10 RX ADMIN — ACETAMINOPHEN 975 MG: 325 TABLET, FILM COATED ORAL at 20:35

## 2025-02-10 RX ADMIN — Medication 1 CAPSULE: at 10:36

## 2025-02-10 RX ADMIN — CALCITRIOL CAPSULES 0.25 MCG 0.25 MCG: 0.25 CAPSULE ORAL at 20:35

## 2025-02-10 RX ADMIN — PANTOPRAZOLE SODIUM 40 MG: 40 TABLET, DELAYED RELEASE ORAL at 10:39

## 2025-02-10 RX ADMIN — ACETAMINOPHEN 975 MG: 325 TABLET, FILM COATED ORAL at 05:20

## 2025-02-10 RX ADMIN — SEVELAMER CARBONATE 800 MG: 800 TABLET, FILM COATED ORAL at 13:19

## 2025-02-10 RX ADMIN — CINACALCET 60 MG: 30 TABLET ORAL at 20:35

## 2025-02-10 ASSESSMENT — ACTIVITIES OF DAILY LIVING (ADL)
ADLS_ACUITY_SCORE: 67
ADLS_ACUITY_SCORE: 75
ADLS_ACUITY_SCORE: 71
ADLS_ACUITY_SCORE: 71
ADLS_ACUITY_SCORE: 75
ADLS_ACUITY_SCORE: 67
ADLS_ACUITY_SCORE: 75
ADLS_ACUITY_SCORE: 71
ADLS_ACUITY_SCORE: 75
ADLS_ACUITY_SCORE: 75
ADLS_ACUITY_SCORE: 71
ADLS_ACUITY_SCORE: 71
ADLS_ACUITY_SCORE: 75
ADLS_ACUITY_SCORE: 71
ADLS_ACUITY_SCORE: 75
ADLS_ACUITY_SCORE: 75
ADLS_ACUITY_SCORE: 71
ADLS_ACUITY_SCORE: 75
ADLS_ACUITY_SCORE: 75
ADLS_ACUITY_SCORE: 71
ADLS_ACUITY_SCORE: 67

## 2025-02-10 NOTE — CONSULTS
SPIRITUAL HEALTH SERVICES - Consult Note  [facility] Gen Surg  Referral Source/Reason for Visit: Social Work requested spiritual/emotional support for patient/family.    Summary and Recommendations -  Arnulfo is unsure what HD and amputations will do to his quality of life.  He seems confident in his ability to manage his own life and has friends and family nearby.  He is a  and finds meaning in this form of expression.  He is determined to keep going.    Plan: Spiritual Care remains available.      Rev Desire Batista MA  Associate     SHS available 24/7 for emergent requests/referrals, either by paging the on-call  or by entering an ASAP/STAT consult in Epic (this will also page the on-call ).      Assessment    Saw pt Arnulfo Pritchett per Consult for emotional support.    Patient/Family Understanding of Illness and Goals of Care - Arnulfo shared he will have surgery tomorrow morning to amputate toes on is right foot. He is also looking to switch to hemodialysis.    Distress and Loss - He's unsure what all this means for his quality of life. He discussed his having moved to [place] some months ago to be near his grand-babies but having been in hospital or rehab for most of the time since.    Strengths, Coping, and Resources - Arnulfo has family and friends, and he's been managing his own affairs and transport thus far. 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    Meaning, Beliefs, and Spirituality - He described two close friends, one of whom he's known for 20 years, as being spiritual guides for him. We considered the blessing of having people in our lives to share deep conversations with. Arnulfo named wanting to get to heaven some day and being determined to keep going.     Plan of Care - I affirmed experiences, invited discernment, encouraged Arnulfo to reach out as desired, and said a blessing. Visit ended as Arnulfo wanted to sleep.

## 2025-02-10 NOTE — PROGRESS NOTES
VASCULAR SURGERY PROGRESS NOTE    Subjective:  No acute events overnight. Patient would still want to proceed with limb salvage.     Objective:  Intake/Output Summary (Last 24 hours) at 2/10/2025 0809  Last data filed at 2/10/2025 0510  Gross per 24 hour   Intake 290 ml   Output 200 ml   Net 90 ml     PHYSICAL EXAM:  /75   Pulse 96   Temp 96.8  F (36  C) (Axillary)   Resp 14   Wt 74.9 kg (165 lb 2 oz)   SpO2 98%   BMI 21.79 kg/m    Alert and oriented x4, neurologically intact  Nonlabored breathing, on 0.5L NC  Abdomen remains soft, nondistended, nontender, groin incision intact, incisions intact  R lower extremity has monophasic(strong) AT signal with strong monophasic DP  Groin site remains soft, mildly tender, hematoma present    ASSESSMENT:  64 year old male who is POD13 s/p R fem-BK pop bypass with PTFE and proximal short GSV interposition graft. Out of ICU in C. Worsening R pleural effusion of unclear etiology despite thoracentesis, most recent thoracentesis on 2/2. Patient opting for attempted limb salvage rather than amputation.      PLAN:  -discussion about switching to HD versus PD, awaiting primary nephrology recs  -NGTD from cultures 2/2  -oob, physical therapy  -continues to have leukocytosis, awaiting AM labs  -podiatry recs offloading rook boots at all times, observing demarcation of toes for further planning. Suspect TMA is necessary given all 5 toes have ischemic changes  -plavix, heparin drip  -speech involved  -appreciate all teams involved in patient's care  -pulmonary toilet     Discussed pt history, exam, assessment and plan with vascular surgery staff    Cecilia Sterans, NP  VASCULAR SURGERY

## 2025-02-10 NOTE — PROGRESS NOTES
[facility]    Medicine Progress Note - Hospitalist Service    Date of Admission:  1/21/2025    Assessment & Plan   Arnulfo Pritchett is a 64 year old male with past medical history of severe heart failure (EF 30%), ESRD (on peritoneal dialysis), chronic paroxysmal AF on warfarin, LLE PAD s/p endarterectomy, RCC s/p nephrectomy admitted on 1/21/2025 for septic shock secondary to right leg PAD with worsening right heel necrotic ulcer. The patient was seen in ED at outside hospital on 1/3/25 and diagnosed with pneumonia, later completing course of Cefdinir. He then presented on 1/21/25 to outside hospital for right leg pain and found to have right leg ulcer and transferred to [facility] for vascular surgery evaluation. Now s/p right common femoral BK popliteal/tibial endarterectomy and right common femoral to below-knee popliteal/tibial PTFE bypass performed on 1/27/25. The patient was admitted to the ICU for requirement of mechanical ventilation and pressor support following surgery for multifactorial shock.      Hospitalist service consulted 1/31/2025 for transfer out of ICU and to assist with medical management along with vascular surgery, podiatry and Infectious disease.     Hx PAD of LLE s/p endarterectomy and fem-tibioperoneal trunk bypass on 10/25/24  Hx RLE arterial occlusion s/p SFA angioplasty and stent 5/2024  PAD RLE, right heel gangrene, occluded SFA, no evidence of osteomyelitis  s/p right common femoral and popliteal/tibial endarterectomy, right femoral to popliteal/tibial PTFE bypass 1/27/25   -Found to have critical limb ischemia on admission on January 21, for details see admission note.   -Distributive shock following surgery followed by the ICU service until 1/31.  -Followed by vascular surgery, podiatry, wound care, and ID.  -Coumadin has been held since admission, now on IV heparin, vascular surgery/podiatry to review when to restart warfarin pending any potential surgical  intervention for worsening ischemic changes in toes of right foot as noted below.  -Has been maintained on statin and Plavix 75 mg daily during this hospital stay.  -Recent IV piperacillin/tazobactam (Zosyn), discontinued 2/2 per ID; follow-up cultures, monitor off antibiotics.  -Any future plans for surgical debridement as per vascular surgery and podiatry.  -Wound care per surgery and WOC.  -pain control, see hospital orders.  -worsening ischemic changes in 2nd-5th toes of right foot.  Podiatry and vascular surgery following. Podiatry does not think these changes represent clear signs of infection. They think these correspond to his ongoing vascular diease. Per vascular, patient presents limb salvage approach than amputation.   - continue offloading rooke boot at all times per podiatry and observe demarcation of toes for further planning.      Multifactorial shock, resolved  ICU admission, weaned off vasopressors 1/30.  -Transitioned to midodrine with increase in prior to admission dose on1/31.  -Monitor  vitals     End-stage renal disease (ESRD), on peritoneal dialysis  Hx of secondary hyperparathyroidism and hyperphosphatemia, phosphorous elevated above baseline of 6 at most recent of 7.9   Nephrology consulted, ongoing follow-up.  Did not require hemodialysis this hospital stay.  -Ongoing peritoneal dialysis, as per nephrology. Patient expressed interest to switch to HD. Nephrology following up with patients outpatient nephrologist to discuss this further .  -Continue midodrine.     Acute hypoxemic respiratory failure  Right pleural effusion  Status post thoracentesis 1/24/25, repeat 2/2/25  -Following surgery on admission by vascular surgery was intubated.  Recent pneumonia treated with 3 weeks of Ceftin prior to this admission.  No concern for pneumonia per intensivist service.  -Extubated 1/29/25.  -Right pleural effusion.  Status post thoracentesis1/24/25 with 1.5 L clear yellow fluid removed, likely  transudative based on low cell count of 117, , and protein of 1.7. Possibly secondary to reduced peritoneal dialysis during hospitalization vs underlying severe HFrEF (30%).  -CT chest 2/1/25, see report, no clear pneumonia; large right pleural effusion noted.  -Wean down oxygen as able, encourage incentive spirometry.  -Antibiotics as above, discontinued by ID 2/2.  -ID consulted 2/1 as above, recommend monitoring patient off antibiotics  -S/p repeat Right thoracentesis on 2/2 with 2.3 L of rolan fluid removed; additional studies, cultures (negative to date), cytology (negative for malignancy)     Paroxysmal atrial fibrillation  Chronic anticoagulation prior to admission, warfarin - ON HOLD  Ischemic cardiomyopathy with HLD, CAD s/p multiple stents, and severe EFrEF (30%)   Wide complex tachycardia 2/1/25  Assessment-postoperatively has been maintained on heparin drip.  Coumadin has been held during his hospital stay.  -Echo 1/31-EF 25 to 30%.  Severe global hypokinesis with abnormal septal motion consistent with conduction abnormality.  Severe inferior wall hypokinesis.  LVH.  RV mildly dilated and mildly reduced RV function.  Mild mitral regurgitation.  Mild to moderate mitral stenosis.  Moderate aortic stenosis.  Left ventricular fairly pressures elevated.  -Episode of wide-complex tachycardia noted by nursing staff, 15 beats, up on 2/1/2025.  -Continue inpatient telemetry.  -Consider follow-up cardiology consultation if recurrent wide-complex tachycardia in the setting of left ventricular dysfunction.  -Restarted prior to admission beta-blocker, Toprol XL, at adjusted dose, monitoring heart rate and blood pressure and consider titrating back up to PTA dose as indicated.  -Continue IV heparin anticoagulation, with future transition to oral Coumadin as prior to admission when appropriate from vascular surgery/podiatry standpoint.        Anemia of chronic disease  Baseline around 10.  Has been stable in the  "8 range.  No signs of bleeding.  -Hemoglobin trended down to 6.7 on 2/8.  No signs of active bleed.  -s/p 1 unit PRBC  -Nephrology  started patient on EPO on 2/8 and to continue weekly.  -Continue to monitor hemoglobin daily, now stable.       Hyperglycemia  Type 2 diabetes   hemoglobin A1c 5.7% October 24  On insulin prior to admission.  - increase lantus from 15U to 20U on 2/6/25 for optimal BG control.   -Continue insulin, adjust as needed.  -Monitor for hypoglycemia.  - continue to monitor BG    Mixed anion gap acidosis  -Improved respiratory function, monitor.  Anion gap resolved 1/31.     Abdominal discomfort 1/30, resolved  Noted to have some right upper quadrant pain 1/30.  Now resolved.  LFTs normal.  Was on Zosyn for above surgical issues.   -Monitor abdominal exam.  -Antibiotics managed as noted above.    Moderate protein calorie nutrition  -Nutrition consulted.  -Speech and language therapy (SLP) consulted, diet per speech therapy.     Gout  -PTA allopurinol currently on hold, reassess daily.     History of renal cell carcinoma   -Status post right nephrectomy, monitor.     History of obstructive sleep apnea.  By report, intolerant of CPAP   -Monitor, follow-up with outpatient provider.    Weakness and deconditioning  -PT, OT, speech and language therapy (SLP) consulted.  -Increase activity as tolerated.    Disposition  Anticipated discharge timing see Disposition Plan below.  -Anticipated discharge to transitional care unit.  -Social work consulted for discharge planning.    Goals of care discussion:  Patient had mentioned tonursing staff about \"wanting to die\", aand also mentioned to me he is tired of living like this and he did not think he was going to make it out of the hospital. Palliative consulted. When palliative saw patient the next day, patient appeared to be in a better mood and stated he wanted to continue restorative cares. He has however mentioned to Nephrology that he will like to switch " from peritoneal dialysis to HD as it will make him feel less fatigued , if however HD does not help, then he would consider comfort measures.           Diet: Snacks/Supplements Adult: Expedite Cup; Between Meals  Snacks/Supplements Adult: Nepro Oral Supplement; Between Meals  Combination Diet Soft and Bite Sized Diet (level 6); Thin Liquids (level 0) (Sit at 90 degrees, alternate textures)    DVT Prophylaxis: IV heparin  Rosario Catheter: Not present  Lines: None     Cardiac Monitoring: ACTIVE order. Indication: per MD request  Code Status: Full Code      Clinically Significant Risk Factors         # Hyponatremia: Lowest Na = 132 mmol/L in last 2 days, will monitor as appropriate  # Hypochloremia: Lowest Cl = 92 mmol/L in last 2 days, will monitor as appropriate      # Hypoalbuminemia: Lowest albumin = 1.8 g/dL at 1/30/2025  3:17 PM, will monitor as appropriate     # Hypertension: Noted on problem list    # Chronic heart failure with reduced ejection fraction: last echo with EF <40%          # DMII: A1C = 6.6 % (Ref range: <5.7 %) within past 6 months    # Severe Malnutrition: based on nutrition assessment      # Financial/Environmental Concerns:           Social Drivers of Health    Tobacco Use: Medium Risk (1/27/2025)    Patient History     Smoking Tobacco Use: Former     Smokeless Tobacco Use: Never   Alcohol Use: Unknown (11/28/2018)    [facility], [facility]    AUDIT-C     Frequency of Alcohol Consumption: Monthly or less     Frequency of Binge Drinking: Never   Transportation Needs: High Risk (1/21/2025)    Transportation Needs     Within the past 12 months, has lack of transportation kept you from medical appointments, getting your medicines, non-medical meetings or appointments, work, or from getting things that you need?: Yes   Physical Activity: Unknown (10/8/2024)    Exercise Vital Sign     Days of Exercise per Week: 0  days   Social Connections: Unknown (10/8/2024)    Social Connection and Isolation Panel [NHANES]     Frequency of Social Gatherings with Friends and Family: Patient declined          Disposition Plan     Medically Ready for Discharge: Anticipated in 2-4 Days, unclear pending vascular surgery/podiatry recommendations regarding worsening ischemic changes of toes in right foot; transition off IV heparin back to warfarin; TCU bed availability.         Heidi Chang MD  Hospitalist Service  [facility]  Securely message with [employer] (more info)  Text page via [employer] Paging/Directory   ______________________________________________________________________    Interval History   Patient resting in bed, appears quite lethargic. Denies any pain. Arousable to voice but falls asleep shortly after    Physical Exam   Vital Signs: Temp: 97.3  F (36.3  C) Temp src: Oral BP: 114/79 Pulse: 102   Resp: 16 SpO2: 99 % O2 Device: Nasal cannula Oxygen Delivery: 1/2 LPM  Weight: 165 lbs 1.99 oz    General Appearance: Well appearing for stated age.  Respiratory: CTAB, no rales or ronchi  Cardiovascular: S1, S2 normal, no murmurs  GI: non-tender on palpation, BS present      Medical Decision Making       40 MINUTES SPENT BY ME on the date of service doing chart review, history, exam, documentation & further activities per the note.      Data     I have personally reviewed the following data over the past 24 hrs:    N/A  \   7.6 (L)   / N/A     134 (L) 94 (L) 70.4 (H) /  109 (H)   5.1 26 5.94 (H) \       Imaging results reviewed over the past 24 hrs:   No results found for this or any previous visit (from the past 24 hours).

## 2025-02-10 NOTE — PROGRESS NOTES
Care Management Follow Up    Length of Stay (days): 20    Expected Discharge Date: 02/12/2025     Concerns to be Addressed: discharge planning     Patient plan of care discussed at interdisciplinary rounds: Yes    Anticipated Discharge Disposition: Skilled Nursing Facility     Anticipated Discharge Services:    Anticipated Discharge DME:      Patient/family educated on Medicare website which has current facility and service quality ratings:    Education Provided on the Discharge Plan:    Patient/Family in Agreement with the Plan: yes    Referrals Placed by CM/SW:    Private pay costs discussed: Not applicable    Discussed  Partnership in Safe Discharge Planning  document with patient/family: Yes:     Handoff Completed: Yes, non-[facility] PCP: External handoff communication completed    Additional Information:  Writer completing chart review, patient is not medically for discharge but may need TCU and/or new dialysis set up     Next Steps: care coordination will continue to follow for discharge planning     KATELYN Duarte

## 2025-02-10 NOTE — PROGRESS NOTES
[facility]    Hospitalist Progress Note    Interval History   Patient is awake and alert.  Appears quite pale and fatigued.  Plan on proceeding with transmetatarsal amputation tomorrow.  He is still requesting to transition to hemodialysis from peritoneal dialysis.  Endorsing significant pain with his right foot.    -Data reviewed today: I reviewed all new labs and imaging results over the last 24 hours. I personally reviewed the chest x-ray image(s) showing small right pleural effusion  .    Physical Exam   Temp: 97.3  F (36.3  C) Temp src: Oral BP: 114/79 Pulse: 102   Resp: 16 SpO2: 99 % O2 Device: Nasal cannula Oxygen Delivery: 1/2 LPM  Vitals:    02/08/25 0704 02/08/25 2152 02/09/25 0714   Weight: 78.1 kg (172 lb 2.9 oz) 79.6 kg (175 lb 7.8 oz) 74.9 kg (165 lb 2 oz)     Vital Signs with Ranges  Temp:  [96.8  F (36  C)-97.5  F (36.4  C)] 97.3  F (36.3  C)  Pulse:  [] 102  Resp:  [12-25] 16  BP: ()/(71-81) 114/79  SpO2:  [98 %-100 %] 99 %  I/O last 3 completed shifts:  In: 290 [P.O.:290]  Out: 250 [Urine:250]    Physical Exam  Constitutional:       Appearance: He is ill-appearing.   Cardiovascular:      Rate and Rhythm: Regular rhythm. Tachycardia present.      Pulses: Normal pulses.      Heart sounds: Normal heart sounds.   Pulmonary:      Effort: Respiratory distress present.      Breath sounds: Normal breath sounds.      Comments: Bilateral basal crackles present   Abdominal:      General: Bowel sounds are normal. There is distension.      Tenderness: There is no abdominal tenderness. There is no guarding.   Musculoskeletal:      Comments: Right 5 toes ischemic on the plantar surface and 2/3rd toe on the dorsal surface. Heel ulcer present . Previous left great toe amputation. B/l dp pulses present    Skin:     General: Skin is warm and dry.      Coloration: Skin is pale.      Comments: Induration and erythema in the groin surgical site    Neurological:      General: No  focal deficit present.           Medications   Current Facility-Administered Medications   Medication Dose Route Frequency Provider Last Rate Last Admin    dextrose 10% infusion   Intravenous Continuous PRN Walter Dempsey MD        dianeal PD LOW calcium-2.5% dex (calcium 2.5 mEq/L) 6,000 mL PERITONEAL DIALYSATE   Dialysis [facility] Supplied PD Jennifer Jameson MD        heparin 25,000 units in 0.45% NaCl 250 mL ANTICOAGULANT infusion  0-5,000 Units/hr Intravenous Continuous Walter Dempsey MD 14.5 mL/hr at 02/10/25 0833 1,450 Units/hr at 02/10/25 0833    Patient is already receiving anticoagulation with heparin, enoxaparin (LOVENOX), warfarin (COUMADIN)  or other anticoagulant medication   Does not apply Continuous PRN Walter Dempsey MD         Current Facility-Administered Medications   Medication Dose Route Frequency Provider Last Rate Last Admin    - MEDICATION INSTRUCTIONS for Dialysis Patients -   Does not apply See Admin Instructions Walter Dempsey MD        acetaminophen (TYLENOL) tablet 975 mg  975 mg Oral or Feeding Tube Q8H Walter Dempsey MD   975 mg at 02/10/25 0520    Or    acetaminophen (TYLENOL) Suppository 650 mg  650 mg Rectal Q8H Walter Dempsey MD        [Held by provider] allopurinol (ZYLOPRIM) tablet 200 mg  200 mg Oral QPM Walter Dempsey MD   200 mg at 01/26/25 1957    atorvastatin (LIPITOR) tablet 40 mg  40 mg Oral or Feeding Tube At Bedtime Walter Dempsey MD   40 mg at 02/09/25 2108    calcitRIOL (ROCALTROL) capsule 0.25 mcg  0.25 mcg Oral At Bedtime Walter Dempsey MD   0.25 mcg at 02/09/25 2108    cinacalcet (SENSIPAR) tablet 60 mg  60 mg Oral Q Mon Wed Fri AM Walter Dempsey MD   60 mg at 02/07/25 2122    clopidogrel (PLAVIX) tablet 75 mg  75 mg Oral or Feeding Tube QPM Walter Dempsey MD   75 mg at 02/09/25 2108    epoetin evaristo-epbx (RETACRIT) injection 20,000 Units  20,000 Units Intravenous Weekly Jennifer Jameson MD   20,000 Units at 02/09/25 9413     insulin aspart (NovoLOG) injection (RAPID ACTING)   Subcutaneous TID w/meals Walter Dempsey MD   2 Units at 02/10/25 1033    insulin aspart (NovoLOG) injection (RAPID ACTING)  1-7 Units Subcutaneous TID  Walter Dempsey MD   1 Units at 02/09/25 1101    insulin aspart (NovoLOG) injection (RAPID ACTING)  1-5 Units Subcutaneous At Bedtime Walter Dempsey MD   1 Units at 02/07/25 0040    insulin glargine (LANTUS PEN) injection 20 Units  20 Units Subcutaneous Daily Afshinu-Heidi Méndez MD   20 Units at 02/10/25 1035    melatonin tablet 5 mg  5 mg Oral At Bedtime Walter Dempsey MD   5 mg at 02/09/25 2108    metoprolol succinate ER (TOPROL-XL) 24 hr half-tab 12.5 mg  12.5 mg Oral Daily Larry Askew MD   12.5 mg at 02/10/25 1035    midodrine (PROAMATINE) tablet 10 mg  10 mg Oral BID w/meals Walter Dempsey MD   10 mg at 02/10/25 1036    multivitamin RENAL (TRIPHROCAPS) capsule 1 capsule  1 capsule Oral Daily Walter Dempsey MD   1 capsule at 02/10/25 1036    pantoprazole (PROTONIX) EC tablet 40 mg  40 mg Oral QAM  Carolina Alejandre, [facility]   40 mg at 02/10/25 1039    polyethylene glycol (MIRALAX) Packet 17 g  17 g Oral or NG Tube Daily Walter Dempsey MD   17 g at 02/06/25 0839    senna-docusate (SENOKOT-S/PERICOLACE) 8.6-50 MG per tablet 1 tablet  1 tablet Oral or NG Tube BID Walter Dempsey MD   1 tablet at 02/09/25 2108    sevelamer carbonate (RENVELA) tablet 800 mg  800 mg Oral TID w/meals Walter Dempsey MD   800 mg at 02/09/25 1837    sodium chloride (PF) 0.9% PF flush 3 mL  3 mL Intracatheter Q8H Walter Dempsey MD   3 mL at 02/10/25 1041       Data   Recent Labs   Lab 02/10/25  0908 02/10/25  0635 02/10/25  0231 02/09/25  2238 02/09/25  1607 02/09/25  1446 02/09/25  0756 02/09/25  0656 02/08/25  0813 02/08/25  0543 02/07/25  0821 02/07/25  0607   WBC  --   --   --   --   --   --   --  15.2*  --  15.2*  --  19.7*   HGB  --  7.6*  --  7.9*  --  8.1*  --  8.2*  8.2*   < >  6.7*  --  7.6*   MCV  --   --   --   --   --   --   --  92  --  94  --  96   PLT  --   --   --   --   --   --   --  560*  --  488*  --  531*   NA  --  134*  --   --   --   --   --  132*  --  130*  --  129*   POTASSIUM  --  5.1  --   --   --   --   --  5.1  --  4.8  --  4.8   CHLORIDE  --  94*  --   --   --   --   --  92*  --  92*  --  91*   CO2  --  26  --   --   --   --   --  25  --  25  --  23   BUN  --  70.4*  --   --   --   --   --  71.2*  --  71.2*  --  70.0*   CR  --  5.94*  --   --   --   --   --  5.71*  --  5.87*  --  5.88*   ANIONGAP  --  14  --   --   --   --   --  15  --  13  --  15   TERENCE  --  10.0  --   --   --   --   --  9.8  --  9.8  --  9.6   * 137* 144*  --    < >  --    < > 227*   < > 190*   < > 187*    < > = values in this interval not displayed.       No results found for this or any previous visit (from the past 24 hours).      Assessment & Plan      Arnulfo Pritchett is a 64 year old male with past medical history of severe heart failure (EF 30%), ESRD (on peritoneal dialysis), chronic paroxysmal AF on warfarin, LLE PAD s/p endarterectomy, RCC s/p nephrectomy admitted on 1/21/2025 for septic shock secondary to right leg PAD with worsening right heel necrotic ulcer. The patient was seen in ED at outside hospital on 1/3/25 and diagnosed with pneumonia, later completing course of Cefdinir. He then presented on 1/21/25 to outside hospital for right leg pain and found to have right leg ulcer and transferred to [facility] for vascular surgery evaluation. Now s/p right common femoral BK popliteal/tibial endarterectomy and right common femoral to below-knee popliteal/tibial PTFE bypass performed on 1/27/25. The patient was admitted to the ICU for requirement of mechanical ventilation and pressor support following surgery for multifactorial shock.       Hospitalist service consulted 1/31/2025 for transfer out of ICU and to assist with medical management along with vascular surgery, podiatry and  Infectious disease.     Hx PAD of LLE s/p endarterectomy and fem-tibioperoneal trunk bypass on 10/25/24  Hx RLE arterial occlusion s/p SFA angioplasty and stent 5/2024  PAD RLE, right heel gangrene, occluded SFA, no evidence of osteomyelitis  s/p right common femoral and popliteal/tibial endarterectomy, right femoral to popliteal/tibial PTFE bypass 1/27/25   -Found to have critical limb ischemia on admission on January 21, for details see admission note.   -Distributive shock following surgery followed by the ICU service until 1/31.  -Followed by vascular surgery, podiatry, wound care, and ID.  -Coumadin has been held since admission, now on IV heparin, vascular surgery/podiatry to review when to restart warfarin pending any potential surgical intervention for worsening ischemic changes in toes of right foot as noted below.  -Has been maintained on statin and Plavix 75 mg daily during this hospital stay.  -Recent IV piperacillin/tazobactam (Zosyn), discontinued 2/2 per ID; follow-up cultures, monitor off antibiotics.  -Wound care per surgery and WOC.  -pain control, see hospital orders.  -worsening ischemic changes in 2nd-5th toes of right foot.  Podiatry and vascular surgery following. Podiatry does not think these changes represent clear signs of infection. They think these correspond to his ongoing vascular diease. Per vascular, patient presents limb salvage approach than amputation.   -After discussion with the patient podiatry plans to proceed with transmetatarsal amputation on 2/11/2025.  N.p.o. after midnight.  Stop orders on heparin placed by podiatry.     Multifactorial shock, resolved  ICU admission, weaned off vasopressors 1/30.  -Transitioned to midodrine with increase in prior to admission dose on1/31.  -Monitor  vitals     End-stage renal disease (ESRD), on peritoneal dialysis  Hx of secondary hyperparathyroidism and hyperphosphatemia, phosphorous elevated above baseline of 6 at most recent of 7.9    Nephrology consulted, ongoing follow-up.  Did not require hemodialysis this hospital stay.  -Ongoing peritoneal dialysis, as per nephrology. Patient expressed interest to switch to HD. Nephrology following up with patients outpatient nephrologist to discuss this further .  -Continue midodrine.     Acute hypoxemic respiratory failure  Right pleural effusion  Status post thoracentesis 1/24/25, repeat 2/2/25  -Following surgery on admission by vascular surgery was intubated.  Recent pneumonia treated with 3 weeks of Ceftin prior to this admission.  No concern for pneumonia per intensivist service.  -Extubated 1/29/25.  -Right pleural effusion.  Status post thoracentesis1/24/25 with 1.5 L clear yellow fluid removed, likely transudative based on low cell count of 117, , and protein of 1.7. Possibly secondary to reduced peritoneal dialysis during hospitalization vs underlying severe HFrEF (30%).  -CT chest 2/1/25, see report, no clear pneumonia; large right pleural effusion noted.  -Wean down oxygen as able, encourage incentive spirometry.  -Antibiotics as above, discontinued by ID 2/2.  -ID consulted 2/1 as above, recommend monitoring patient off antibiotics  -S/p repeat Right thoracentesis on 2/2 with 2.3 L of rolan fluid removed; additional studies, cultures (negative to date), cytology (negative for malignancy)      Paroxysmal atrial fibrillation  Chronic anticoagulation prior to admission, warfarin - ON HOLD  Ischemic cardiomyopathy with HLD, CAD s/p multiple stents, and severe EFrEF (30%)   Wide complex tachycardia 2/1/25  Assessment-postoperatively has been maintained on heparin drip.  Coumadin has been held during his hospital stay.  -Echo 1/31-EF 25 to 30%.  Severe global hypokinesis with abnormal septal motion consistent with conduction abnormality.  Severe inferior wall hypokinesis.  LVH.  RV mildly dilated and mildly reduced RV function.  Mild mitral regurgitation.  Mild to moderate mitral stenosis.   Moderate aortic stenosis.  Left ventricular fairly pressures elevated.  -Episode of wide-complex tachycardia noted by nursing staff, 15 beats, up on 2/1/2025.  -Continue inpatient telemetry.  -Consider follow-up cardiology consultation if recurrent wide-complex tachycardia in the setting of left ventricular dysfunction.  -Restarted prior to admission beta-blocker, Toprol XL, at adjusted dose, monitoring heart rate and blood pressure and consider titrating back up to PTA dose as indicated.  -Continue IV heparin anticoagulation, with future transition to oral Coumadin as prior to admission when appropriate from vascular surgery/podiatry standpoint.        Anemia of chronic disease  Baseline around 10.  Has been stable in the 8 range.  No signs of bleeding.  -Hemoglobin trended down to 6.7 on 2/8.  No signs of active bleed.  -s/p 1 unit PRBC  -Nephrology  started patient on EPO on 2/8 and to continue weekly.  -Continue to monitor hemoglobin daily, now stable.  Currently at 7.6.  -Will transfuse for hemoglobin less than 7.        Hyperglycemia  Type 2 diabetes   hemoglobin A1c 5.7% October 24  On insulin prior to admission.  - increase lantus from 15U to 20U on 2/6/25 for optimal BG control.   -Continue insulin, adjust as needed.  -Monitor for hypoglycemia.  - continue to monitor BG     Mixed anion gap acidosis  -Improved respiratory function, monitor.  Anion gap resolved 1/31.     Abdominal discomfort 1/30, resolved  Noted to have some right upper quadrant pain 1/30.  Now resolved.  LFTs normal.  Was on Zosyn for above surgical issues.   -Monitor abdominal exam.  -Antibiotics managed as noted above.     Moderate protein calorie nutrition  -Nutrition consulted.  -Speech and language therapy (SLP) consulted, diet per speech therapy.     Gout  -PTA allopurinol currently on hold, reassess daily.     History of renal cell carcinoma   -Status post right nephrectomy, monitor.     History of obstructive sleep apnea.  By  "report, intolerant of CPAP   -Monitor, follow-up with outpatient provider.     Weakness and deconditioning  -PT, OT, speech and language therapy (SLP) consulted.  -Increase activity as tolerated.     Disposition  Anticipated discharge timing see Disposition Plan below.  -Anticipated discharge to transitional care unit.  -Social work consulted for discharge planning.     Goals of care discussion:  Patient had mentioned tonursing staff about \"wanting to die\", aand also mentioned to me he is tired of living like this and he did not think he was going to make it out of the hospital. Palliative consulted. When palliative saw patient the next day, patient appeared to be in a better mood and stated he wanted to continue restorative cares. He has however mentioned to Nephrology that he will like to switch from peritoneal dialysis to HD as it will make him feel less fatigued , if however HD does not help, then he would consider comfort measures.         Clinically Significant Risk Factors      # Hyponatremia: Lowest Na = 132 mmol/L in last 2 days, will monitor as appropriate  # Hypochloremia: Lowest Cl = 92 mmol/L in last 2 days, will monitor as appropriate      # Hypoalbuminemia: Lowest albumin = 1.8 g/dL at 1/30/2025  3:17 PM, will monitor as appropriate     # Hypertension: Noted on problem list  # Chronic heart failure with reduced ejection fraction: last echo with EF <40%          # DMII: A1C = 6.6 % (Ref range: <5.7 %) within past 6 months    # Severe Malnutrition: based on nutrition assessment    # Financial/Environmental Concerns:            DVT Prophylaxis: Heparin SQ  Code Status: Full Code  Medically Ready for Discharge: Anticipated in 2-4 Days      Please see A&P for additional details of medical decision making.  55 MINUTES SPENT BY ME on the date of service doing chart review, history, exam, documentation & further activities per the note.    Ashley Roper MD, MD  [phone](p)   "

## 2025-02-10 NOTE — PROGRESS NOTES
[facility]    Nephrology Progress Note     Assessment & Plan       Arnulfo Pritchett is a 64 year old male CAD, HTN, HLD, pafib, HFrEF (EF 20-25%), ESRD on PD, DM2, TALI, RCC s/p R nephrectomy (2022), PAD with multiple LE procedures      1) End stage renal disease on PD  Chronic PD for last 3.5 years.  Home unit [facility], nephrologist Dr. May  Rx: 5 cycles, 2 L fill volumes, 9 hours, last fill 1.2 L with Extraneal.       2) Moderate R pleural effusion: Status post thoracentesis on 1/24 and 2/2.  Transudative  Chest x-ray on 2/5 shows small layering effusion on right side     3) PAD, right heel gangrene, occluded right SFA.  Status post rt fem - below knee popliteal bypass this admission     4) Postsurgical anemia in patient with ESRD-  -status post blood transfusion.  Hemoglobin 7-8.   -EPO 20,000 units 2/9/25 and continue weekly                              Other issues-  Diabetes mellitus  Chronic paroxysmal atrial fibrillation  Secondary hyperparathyroidism and hyperphosphatemia  Severe heart failure with EF of 30% and mild RV dysfunction.      Discussion/plan:  Patient reports he is tired of doing PD and does not think he is thriving on it.  He wants to know if hemodialysis is going to be an option.  If not, he reports he has been thinking about pursuing palliative care, but would like to try hemodialysis first.     He was on hemodialysis before.  He switched to PD to better accommodate his work schedule.  He is no longer working.  He is very frail.  I discussed with him that hemodialysis will still be stressful to his body and he may not do too much better than he is doing on PD.  I spoke to Dr. May who confirms that he is not doing well on PD and she supports the move to HD.            Pt has not been getting extraneal here. UF is about 700 ml with 2.5% and around 1600 cc with mix of 4.5% - 2.5%       Plan:      PD order placed.   Mix of 4.25% and 2.5% , 2 L fill x 5  over 9 hours .  We have been alternating 2.5% with mix of 4.5% and 2.5% to get adequate UF.        We will plan on change to HD this week.  Need to check with [facility].   I spoke to Marija ZEPEDA who will get the CC on the case.          Harish Minor MD  [facility] - Nephrology  [phone]    Interval History     He feels that the PD is wearing him out.  He has been unable to make it to PD clinic visits since Sept. All visits since then have been by telehealth.  I called Dr. May and discussed this with her.      UF total 350 mL.      Physical Exam   Temp: 97.3  F (36.3  C) Temp src: Oral BP: 114/79 Pulse: 102   Resp: 16 SpO2: 99 % O2 Device: Nasal cannula Oxygen Delivery: 1/2 LPM  Vitals:    02/08/25 0704 02/08/25 2152 02/09/25 0714   Weight: 78.1 kg (172 lb 2.9 oz) 79.6 kg (175 lb 7.8 oz) 74.9 kg (165 lb 2 oz)     Vital Signs with Ranges  Temp:  [96.8  F (36  C)-97.5  F (36.4  C)] 97.3  F (36.3  C)  Pulse:  [] 102  Resp:  [12-16] 16  BP: ()/(75-81) 114/79  SpO2:  [98 %-100 %] 99 %  I/O last 3 completed shifts:  In: 290 [P.O.:290]  Out: 250 [Urine:250]    GENERAL APPEARANCE: pleasant, NAD, a & o  HEENT:  Eyes/ears/nose/neck grossly normal  RESP: lungs cta b c good efforts, no crackles, rhonchi or wheezes  CV: RRR, nl S1/S2, no m/r/g   ABDOMEN: o/s/nt/nd, bs present  EXTREMITIES/SKIN: no rashes/lesions; thigh edema edema    Medications   Current Facility-Administered Medications   Medication Dose Route Frequency Provider Last Rate Last Admin    dextrose 10% infusion   Intravenous Continuous PRN Walter Dempsey MD        dianeal PD LOW calcium-2.5% dex (calcium 2.5 mEq/L) 6,000 mL PERITONEAL DIALYSATE   Dialysis [facility] Supplied PD Harish Minor MD        dianeal PD LOW calcium-2.5% dex (calcium 2.5 mEq/L) 6,000 mL PERITONEAL DIALYSATE   [facility] Jennifer Jameson MD        dianeal PD LOW calcium-4.25% dex (calcium 2.5 mEq/L) 6,000 mL PERITONEAL  DIALYSATE   Dialysis [facility] Supplied PD Harish Minor MD        heparin 25,000 units in 0.45% NaCl 250 mL ANTICOAGULANT infusion  0-5,000 Units/hr Intravenous Continuous Walter Dempsey MD 14.5 mL/hr at 02/10/25 0833 1,450 Units/hr at 02/10/25 0833    Patient is already receiving anticoagulation with heparin, enoxaparin (LOVENOX), warfarin (COUMADIN)  or other anticoagulant medication   Does not apply Continuous PRN Walter Dempsey MD         Current Facility-Administered Medications   Medication Dose Route Frequency Provider Last Rate Last Admin    - MEDICATION INSTRUCTIONS for Dialysis Patients -   Does not apply See Admin Instructions Walter Dempsey MD        acetaminophen (TYLENOL) tablet 975 mg  975 mg Oral or Feeding Tube Q8H Walter Dempsey MD   975 mg at 02/10/25 1319    Or    acetaminophen (TYLENOL) Suppository 650 mg  650 mg Rectal Q8H Walter Dempsey MD        [Held by provider] allopurinol (ZYLOPRIM) tablet 200 mg  200 mg Oral QPM Walter Dempsey MD   200 mg at 01/26/25 1957    atorvastatin (LIPITOR) tablet 40 mg  40 mg Oral or Feeding Tube At Bedtime Walter Dempsey MD   40 mg at 02/09/25 2108    calcitRIOL (ROCALTROL) capsule 0.25 mcg  0.25 mcg Oral At Bedtime Walter Dempsey MD   0.25 mcg at 02/09/25 2108    cinacalcet (SENSIPAR) tablet 60 mg  60 mg Oral Q Mon Wed Fri AM Walter Dempsey MD   60 mg at 02/07/25 2122    clopidogrel (PLAVIX) tablet 75 mg  75 mg Oral or Feeding Tube QPM Walter Dempsey MD   75 mg at 02/09/25 2108    epoetin evaristo-epbx (RETACRIT) injection 20,000 Units  20,000 Units Intravenous Weekly Jennifer Jameson MD   20,000 Units at 02/09/25 1739    insulin aspart (NovoLOG) injection (RAPID ACTING)   Subcutaneous TID w/meals Walter Dempsey MD   2 Units at 02/10/25 1033    insulin aspart (NovoLOG) injection (RAPID ACTING)  1-7 Units Subcutaneous TID AC Walter Dempsey MD   1 Units at 02/09/25 1101    insulin aspart (NovoLOG) injection (RAPID ACTING)   1-5 Units Subcutaneous At Bedtime Walter Dempsey MD   1 Units at 02/07/25 0040    insulin glargine (LANTUS PEN) injection 20 Units  20 Units Subcutaneous Daily Enu-Heidi Méndez MD   20 Units at 02/10/25 1035    melatonin tablet 5 mg  5 mg Oral At Bedtime Walter Dempsey MD   5 mg at 02/09/25 2108    metoprolol succinate ER (TOPROL-XL) 24 hr half-tab 12.5 mg  12.5 mg Oral Daily Larry Askew MD   12.5 mg at 02/10/25 1035    midodrine (PROAMATINE) tablet 10 mg  10 mg Oral BID w/meals Walter Dempsey MD   10 mg at 02/10/25 1036    multivitamin RENAL (TRIPHROCAPS) capsule 1 capsule  1 capsule Oral Daily Walter Dempsey MD   1 capsule at 02/10/25 1036    pantoprazole (PROTONIX) EC tablet 40 mg  [facility] Alejandre, [facility]   40 mg at 02/10/25 1039    polyethylene glycol (MIRALAX) Packet 17 g  17 g Oral or NG Tube Daily Walter Dempsey MD   17 g at 02/06/25 0839    senna-docusate (SENOKOT-S/PERICOLACE) 8.6-50 MG per tablet 1 tablet  1 tablet Oral or NG Tube BID Walter Dempsey MD   1 tablet at 02/09/25 2108    sevelamer carbonate (RENVELA) tablet 800 mg  800 mg Oral TID w/meals Walter Dempsey MD   800 mg at 02/10/25 1319    sodium chloride (PF) 0.9% PF flush 3 mL  3 mL Intracatheter Q8H Walter Dempsey MD   3 mL at 02/10/25 1041       Data   BMP  Recent Labs   Lab 02/10/25  1228 02/10/25  0908 02/10/25  0635 02/10/25  0231 02/09/25  0756 02/09/25  0656 02/08/25  0813 02/08/25  0543 02/07/25  0821 02/07/25  0607   NA  --   --  134*  --   --  132*  --  130*  --  129*   POTASSIUM  --   --  5.1  --   --  5.1  --  4.8  --  4.8   CHLORIDE  --   --  94*  --   --  92*  --  92*  --  91*   TERENCE  --   --  10.0  --   --  9.8  --  9.8  --  9.6   CO2  --   --  26  --   --  25  --  25  --  23   BUN  --   --  70.4*  --   --  71.2*  --  71.2*  --  70.0*   CR  --   --  5.94*  --   --  5.71*  --  5.87*  --  5.88*   * 109* 137* 144*   < > 227*   < > 190*   < > 187*    < > = values in  this interval not displayed.     [email or web address](phos:4)  CBC)  Recent Labs   Lab 02/10/25  0635 02/09/25  2238 02/09/25  1446 02/09/25  0656 02/08/25  1914 02/08/25  0543 02/07/25  0607 02/06/25  1309 02/06/25  0558   WBC  --   --   --  15.2*  --  15.2* 19.7*  --  18.6*   HGB 7.6* 7.9* 8.1* 8.2*  8.2*   < > 6.7* 7.6*   < > 7.6*   HCT  --   --   --  25.3*  --  20.2* 23.4*  --  23.2*   MCV  --   --   --  92  --  94 96  --  95   PLT  --   --   --  560*  --  488* 531*  --  472*    < > = values in this interval not displayed.         Attestation:   I have reviewed today's relevant vital signs, notes, medications, labs and imaging.

## 2025-02-10 NOTE — PROGRESS NOTES
Cycler set up per protocol and per treatment orders     Results from previous treatment:  Effluent color and clarity: yellow,clear  Total UF: 399  Initial Drain: 57  Avg Dwell: 1:17  Lost Dwell: 0:27    Cycler Serial Number: [id number]  Total Treatment Volume: 10,000mL  Total treatment time: 9 hrs  Fill Volume: 2000ml  Last Fill Volume:0ml  Heater ba.25% 6000mL;  Lot #: [id number];  Expiration Date:   Side Bag #1: 2.5% 6000mL;  Lot #: [id number];  Expiration Date:       Number of cycles including final fill: 5  Dwell Time: 1:22 minutes  Last Fill Volume: 0ml  Initial Drain Alarm: 0ml  PD orders reviewed with Patient procedure and ESRD teaching done and questions answered.  Cycler ready for hook-up.    Report given to: Charge nurse saida YEPEZN

## 2025-02-10 NOTE — PROGRESS NOTES
[place] [facility]  PROGRESS NOTE    CHIEF COMPLAINT:      I was asked by Miol Carrion MD  to evaluate this patient for right foot.    PATIENT HISTORY:  Arnulfo Pritchett is a 64 year old male seen in follow up for his right foot. States some pain with movement. Noted pain to posterior achilles.         Review of Systems:  A 10 point review of systems was performed and is positive for that noted above in the patient history.  All other areas are negative.     PAST MEDICAL HISTORY:   Past Medical History:   Diagnosis Date    CAD (coronary artery disease)     Chronic systolic heart failure (H)     ESRD (end stage renal disease) on dialysis (H)     Gastroesophageal reflux disease without esophagitis     Gout     HLD (hyperlipidemia)     TALI (obstructive sleep apnea)     PAD (peripheral artery disease)     S/p RLE stenting of SFA/popliteal arteries    Type 2 diabetes mellitus with diabetic neuropathy (H)         PAST SURGICAL HISTORY:   Past Surgical History:   Procedure Laterality Date    AMPUTATE TOE(S) Left 10/27/2024    Procedure: AMPUTATION, TOE left great toe;  Surgeon: Haley Joseph DPM, Podiatry/Foot and Ankle Surgery;  Location:  OR    BYPASS GRAFT FEMOROPOPLITEAL Right 1/27/2025    Procedure: RIGHT FEMORAL ARTERY TO RIGHT POPLITEAL ARTERY ENDARTERECTOMY,  COMMON FEMORAL TO POPLITEAL BELOW KNEE COMPOSITE GREATER SAPHENOUS/PTFE BYPASS GRAFT. GREATER SAPHENOUS VEIN HARVEST;  Surgeon: Patrick Hein MD;  Location: [facility] OR    BYPASS GRAFT FEMOROTIBIAL Left 10/25/2024    Procedure: LEFT FEMORAL ENDARTERECTOMY, LEFT FEMORAL TO TIBIAL PERONEAL TRUNK BYPASS WITH LEFT GREAT SEPHANEOUS VEIN, WOUND VAC PLACEMENT ON LEFT GROIN.;  Surgeon: Raul Gray MD;  Location: [facility] OR    BYPASS GRAFT FEMOROTIBIAL Left 10/27/2024    Procedure: CREATION, BYPASS, VASCULAR, FEMORAL TO TIBIAL REVISION OF BYPASS LEFT COMMON FEMORAL TO TIBIAL.;  Surgeon: Raul Gray MD;  Location:  OR     CV CORONARY ANGIOGRAM N/A 11/27/2024    Procedure: Coronary Angiogram;  Surgeon: Clem Matt MD;  Location:  HEART CARDIAC CATH LAB    CV LOWER EXTREMITY ANGIOGRAM LEFT Left 10/27/2024    Procedure: Angiogram Lower Extremity Left;  Surgeon: Raul Gray MD;  Location:  OR    CV PCI N/A 11/27/2024    Procedure: Percutaneous Coronary Intervention;  Surgeon: Clem Matt MD;  Location:  HEART CARDIAC CATH LAB    IR LOWER EXTREMITY ANGIOGRAM LEFT  10/17/2024    IR LOWER EXTREMITY ANGIOGRAM RIGHT  1/23/2025    OR ANGIOGRAM, LOWER EXTREMITY Right 1/27/2025    Procedure: INTRAOPERATIVE ANGIOGRAM;  Surgeon: Patrick Hein MD;  Location:  OR        MEDICATIONS:  Reviewed in Epic. Current.     ALLERGIES:  No Known Allergies     SOCIAL HISTORY:   Social History     Socioeconomic History    Marital status:      Spouse name: Not on file    Number of children: Not on file    Years of education: Not on file    Highest education level: Not on file   Occupational History    Not on file   Tobacco Use    Smoking status: Former     Types: Cigarettes    Smokeless tobacco: Never   Vaping Use    Vaping status: Never Used   Substance and Sexual Activity    Alcohol use: Not Currently     Comment: quit 20 years    Drug use: Never    Sexual activity: Not on file   Other Topics Concern    Not on file   Social History Narrative    Not on file     Social Drivers of Health     Financial Resource Strain: Low Risk  (1/21/2025)    Financial Resource Strain     Within the past 12 months, have you or your family members you live with been unable to get utilities (heat, electricity) when it was really needed?: No   Food Insecurity: Low Risk  (1/21/2025)    Food Insecurity     Within the past 12 months, did you worry that your food would run out before you got money to buy more?: No     Within the past 12 months, did the food you bought just not last and you didn t have money to get more?: No    Transportation Needs: High Risk (1/21/2025)    Transportation Needs     Within the past 12 months, has lack of transportation kept you from medical appointments, getting your medicines, non-medical meetings or appointments, work, or from getting things that you need?: Yes   Physical Activity: Unknown (10/8/2024)    Exercise Vital Sign     Days of Exercise per Week: 0 days     Minutes of Exercise per Session: Not on file   Stress: No Stress Concern Present (10/8/2024)    [employer] [place] [employer] - Occupational Stress Questionnaire     Feeling of Stress : Only a little   Social Connections: Unknown (10/8/2024)    Social Connection and Isolation Panel [NHANES]     Frequency of Communication with Friends and Family: Not on file     Frequency of Social Gatherings with Friends and Family: Patient declined     Attends Denominational Services: Not on file     Active Member of Clubs or Organizations: Not on file     Attends Club or Organization Meetings: Not on file     Marital Status: Not on file   Interpersonal Safety: Low Risk  (1/21/2025)    Interpersonal Safety     Do you feel physically and emotionally safe where you currently live?: Yes     Within the past 12 months, have you been hit, slapped, kicked or otherwise physically hurt by someone?: No     Within the past 12 months, have you been humiliated or emotionally abused in other ways by your partner or ex-partner?: No   Housing Stability: Low Risk  (1/21/2025)    Housing Stability     Do you have housing? : Yes     Are you worried about losing your housing?: No        FAMILY HISTORY: History reviewed. No pertinent family history.     EXAM:Vitals: /75 (BP Location: Right arm)   Pulse 98   Temp 97.3  F (36.3  C) (Oral)   Resp 16   Wt 74.9 kg (165 lb 2 oz)   SpO2 99%   BMI 21.79 kg/m    BMI= Body mass index is 21.79 kg/m .    LABS:     Last Comprehensive Metabolic Panel:  Sodium   Date Value Ref Range Status   02/10/2025 134 (L) 135 - 145  mmol/L Final     Potassium   Date Value Ref Range Status   02/10/2025 5.1 3.4 - 5.3 mmol/L Final     Chloride   Date Value Ref Range Status   02/10/2025 94 (L) 98 - 107 mmol/L Final     Carbon Dioxide (CO2)   Date Value Ref Range Status   02/10/2025 26 22 - 29 mmol/L Final     Anion Gap   Date Value Ref Range Status   02/10/2025 14 7 - 15 mmol/L Final     Glucose   Date Value Ref Range Status   02/10/2025 137 (H) 70 - 99 mg/dL Final     GLUCOSE BY METER POCT   Date Value Ref Range Status   02/10/2025 144 (H) 70 - 99 mg/dL Final     Urea Nitrogen   Date Value Ref Range Status   02/10/2025 70.4 (H) 8.0 - 23.0 mg/dL Final     Creatinine   Date Value Ref Range Status   02/10/2025 5.94 (H) 0.67 - 1.17 mg/dL Final     GFR Estimate   Date Value Ref Range Status   02/10/2025 10 (L) >60 mL/min/1.73m2 Final     Comment:     eGFR calculated using 2021 CKD-EPI equation.     Calcium   Date Value Ref Range Status   02/10/2025 10.0 8.8 - 10.4 mg/dL Final     Lab Results   Component Value Date    WBC 13.0 01/22/2025     Lab Results   Component Value Date    RBC 3.49 01/22/2025     Lab Results   Component Value Date    HGB 10.8 01/22/2025     Lab Results   Component Value Date    HCT 33.8 01/22/2025     Lab Results   Component Value Date     01/22/2025      Lab Results   Component Value Date    INR 2.70 01/22/2025    INR 2.44 01/21/2025    INR 4.7 01/14/2025    INR 2.4 01/09/2025    INR 1.6 01/06/2025    INR 1.9 01/02/2025    INR 1.3 12/30/2024    INR 1.0 12/27/2024    INR 1.1 12/26/2024    INR 1.0 12/24/2024    INR 2.28 12/07/2024    INR 2.59 12/06/2024    INR 2.30 12/05/2024    INR 2.29 12/05/2024    INR 2.39 12/03/2024    INR 1.92 12/02/2024    INR 1.87 12/01/2024    INR 1.64 11/30/2024    INR 1.75 11/29/2024    INR 1.76 11/28/2024        General appearance: Patient is alert and fully cooperative with history & exam.  No sign of distress is noted during the visit.      Respiratory: Breathing is regular & unlabored while  sitting.      HEENT: Hearing is intact to spoken word.  Speech is clear.  No gross evidence of visual impairment that would impact ambulation.      Dermatologic: Right heel: evaluated, with large area of gangrenous/dry tissue to posterior and plantar sites. No drainage from the site. Tender on palpation. Also now noted to l have lateral malleolus eschar.     New ischemia to digits, dorsally is primarily to second and third toe, but plantarly appears to be present to plantar digits 1-5 --> similar in appearance to when last evaluated.      Vascular: Dorsalis pedis and posterior tibial pulses are difficult to palpate. Audible with dopplar. DP audible into midfoot, but weakens at digits level.      Neurologic: Lower extremity sensation is diminished, bilateral foot, to light touch.  No evidence of neurological-based weakness or contracture in the lower extremities.       Musculoskeletal: Patient is ambulatory without an assistive device or brace.  No gross foot or ankle deformity noted.       Psychiatric: Affect is pleasant & appropriate.      All cultures:  Recent Labs   Lab 02/05/25  1650 02/05/25  1638   CULTURE No growth after 4 days No growth after 4 days        IMAGING:     Arterial US 1/22:    IMPRESSION:  1.  No blood flow demonstrated in the distal superficial femoral, popliteal, and runoff arteries. There may be trickle blood flow in the distal runoff arteries, though difficult to distinguish from artifact.     2.  Uncertain if stent is in the right lower extremity. Previous angiogram images are not available for review.    Right ankle MRI:   IMPRESSION:  1.  There is a soft tissue ulceration over the posterior aspect of the hindfoot with some surrounding edema or cellulitis but no evidence for abscess.  2.  No evidence for osteomyelitis.  3.  Small tibiotalar joint effusion.  4.  No evidence for fracture.  5.  Acute on chronic plantar fasciitis. Plantar calcaneal spurring.  6.  No additional tendinous or  ligamentous pathology.    I personally reviewed the images and agree with the reports as stated.      ASSESSMENT:  Right heel gangrene --> no osteomyelitis   Right forefoot gangrene  Peripheral Arterial Disease s/p right fem - BK pop bypass  Diabetes Mellitus --> hba1c: 5.7     MEDICAL DECISION MAKING:   -Discussed all findings with patient. Chart and imaging reviewed.     -Reviewed right foot options with patient. Right digits with gangrene plantarly to 1-5, dorsally to 2 and 3. DP strongly dopplarable into midfoot, but less at level of the digits. Given the extent of gangrene, likely best option is for a transmetatarsal amputation. Discussed with patient that this is at high risk of nonhealing and if there's incisional necrosis, likely would need a below knee amputation.     -This also doesn't address the heel necrosis which is unchanged in appearance. No significant infection, but depth to bone and cannot be treated with local debridement without exposing a large part of bone. Discussed with patient that if the ulcer develops clinical infection, he'd also likely need a below knee amputation.     -He agrees to these risks and states he'd like to attempt limb salvage.     -Will schedule for tomorrow. NPO order after midnight. Will also place a heparin stop order.         Alyce Salas DPM   [facility]  Available via [employer] Text

## 2025-02-10 NOTE — PROGRESS NOTES
Care Management Follow Up    Length of Stay (days): 20    Expected Discharge Date: 02/13/2025     Concerns to be Addressed: discharge planning     Patient plan of care discussed at interdisciplinary rounds: Yes    Anticipated Discharge Disposition: Skilled Nursing Facility     Anticipated Discharge Services:  outpatient dialysis  Anticipated Discharge DME:      Patient/family educated on Medicare website which has current facility and service quality ratings:    Education Provided on the Discharge Plan:    Patient/Family in Agreement with the Plan: yes    Referrals Placed by CM/SW:    Private pay costs discussed: Not applicable    Discussed  Partnership in Safe Discharge Planning  document with patient/family: No     Handoff Completed: No, handoff not indicated or clinically appropriate    Additional Information:  Notified by unit SW regarding need for hemodialysis unit for patient. Discussed with patient and he would like to get into the [facility] unit. Previously patient served at Home unit [facility], nephrologist Dr. May.Writer called admissions at [phone] and spoke to Celine. Referral submitted for unit closest to [place] ( per Celine [place] [facility] has no chair time), [facility] is closest. Celine suggested calling [place] [facility] and get on waitlist. Writer called [facility] unit [phone] and spoke to the unit manager Brook whom confirmed no chairs but will make some internal calls to see if patient can be accommodated since he lives in [place]. Name and number given to Borok for call back.     Next Steps: secure outpatient hemodialysis chair time in [place] or surrounding area.       Calli Hyman RN   [facility]   Phone [phone], [employer] or [phone]

## 2025-02-10 NOTE — PROGRESS NOTES
Cycler set up per protocol and per treatment orders     Results from previous treatment:  Effluent color and clarity: yellow, clear    Machine unplugged for patient transfer, unable to get readings from previous treatment.    Cycler Serial Number: [id number]  Total Treatment Volume: 10,000mL  Total treatment time: 9 hrs  Fill Volume: 2000ml  Last Fill Volume:0ml  Heater ba.5% 6000mL;  Lot #: [id number];  Expiration Date: 2025  Side Bag #1: 2.5% 6000mL;  Lot #: [id number];  Expiration Date: 2025  Number of cycles including final fill: 5  Dwell Time: 1:22 minutes  Last Fill Volume: 0ml  Initial Drain Alarm: 0ml  PD orders reviewed with Patient procedure and ESRD teaching done and questions answered.  Cycler ready for hook-up.    Report given to: ZULEMA Mariano RN

## 2025-02-10 NOTE — PLAN OF CARE
Date & Time: 0262-5270  Summary  Admitted  for right leg pain w/ right leg ulcer    Diagnostic angiogram    Thoracentesis   : Had right common femoral and popliteal tibial endarterectomy, right femoral to popliteal/ tibial PTFE bypass and admitted to ICU for mechanical ventilation    Thoracentesis  Transfer to floor    Behavior & Aggression: Green   Fall Risk: Yes   Orientation: A&Ox4  ABNL VS/O2:VSS on  L via NC   ABNL Labs: B and 144. Q8hr hgb checks: 7.9. Hep XA check for 0600   Pain Management:Pain with movement, using scheduled tylenol, denied need for anything futher   Bowel/Bladder: Pt on PD, little urine. BM x1, passing flatus.   Drains: PIV infusing heparin gtt at 1450 units/hr   Skin: RLE incison sites. R heel ulcer, covered with dressing. R toes ischemic. Blancable redness to coccyx, mepilex over. R groin site   Diet:Soft and bite sized diet, thin liquids   Activity Level: Ax2 lift/ T/R Q2hrs   Anticipated  DC Date: Pending   Significant Information: Ran PD overnight

## 2025-02-10 NOTE — PROGRESS NOTES
"CLINICAL NUTRITION SERVICES - REASSESSMENT NOTE     RECOMMENDATIONS FOR MDs/PROVIDERS TO ORDER:  None at this time    Malnutrition Status:    Severe malnutrition in the context of chronic illness (1/28)  Present on Admission: Unable to Assess    Registered Dietitian Interventions:  Continue daily Ensure and Expedite Cup  Oral intake encouragement appreciated     Future/Additional Recommendations:  None at this time     SUBJECTIVE INFORMATION  Assessed patient in room.    CURRENT NUTRITION ORDERS  Diet: Regular - started today  Supplement: Expedite Cup at 10 am, Chocolate Ensure Enlive at 2 pm  Nutrition Support: TF started 1/28, off 1/29    CURRENT INTAKE/TOLERANCE  Intakes:   2/9: 25  2/8: 25/25/25 2/7: 50/50/75/50    - Ordering small meals BID-TID per [employer]  - Pt reports a good appetite, similar to baseline. Pt is happy to be on a regular diet - \"[eating is] good now that I have pizza\"  - Pt dislikes Expedite Cup - \"tastes terrible\" ... \"I'll do my best\"  - Pt reports drinking 50% Ensure daily    NEW FINDINGS  - Podiatry following - appears plan for transmetatarsal amputation tomorrow, NPO after midnight  - WOCN 2/5: Summary: patient with unstageable pressure injuries to right heel and right lateral malleolus, present on admission, stable on follow up 2/5     Weight: lowest wt yest = 74.9 kg, fluctuating 165-185#    02/09/25 0714 74.9 kg (165 lb 2 oz) Bed scale   02/08/25 2152 79.6 kg (175 lb 7.8 oz) Bed scale   02/08/25 0704 78.1 kg (172 lb 2.9 oz) Bed scale   02/07/25 2123 81 kg (178 lb 9.2 oz) Bed scale   02/07/25 0543 84.1 kg (185 lb 6.5 oz) Bed scale   02/06/25 0100 82.2 kg (181 lb 3.5 oz) Bed scale   02/05/25 0554 81.8 kg (180 lb 5.4 oz) Bed scale   02/04/25 0242 76.7 kg (169 lb 1.5 oz) Bed scale     Skin/wounds: Reviewed  GI symptoms: BM x 3 yesterday, x 1 today so far  Nutrition-relevant labs:  Na 134 (L) BUN 70.4 (H) Cr 5.94 (H) Phos 5.9 (H) BGM  - ongoing PD  Nutrition-relevant medications:  " MSSI, Lantus 20 units, MVI renal, Miralax - not given x 4 days, Senokot last given 2/9    MALNUTRITION  % Intake: < 75% for > 7 days (moderate)  % Weight Loss: Weight loss does not meet criteria   Subcutaneous Fat Loss: Orbital: Severe and Triceps: Severe  Muscle Loss: Wasting of the temples (temporalis muscle): Severe, Clavicles (pectoralis and deltoids): Severe, Shoulders (deltoids): Severe, Thigh (quadriceps): Severe, and Calf (gastrocnemius): Severe  Fluid Accumulation/Edema: None noted  Malnutrition Diagnosis: Severe malnutrition in the context of chronic illness (1/28)  Malnutrition Present on Admission: Unable to assess    EVALUATION OF THE PROGRESS TOWARD GOALS   Previous Goals  Patient to consume % of nutritionally adequate meal trays TID, or the equivalent with supplements/snacks.  Evaluation: Not progressing    Previous Nutrition Diagnosis  Increased nutrient needs related to wound healing, chronic PD as evidenced by low fat/muscle stores, PI to heel  Evaluation: No change    NUTRITION DIAGNOSIS  Increased nutrient needs related to wound healing, chronic PD as evidenced by low fat/muscle stores, PI to heel    INTERVENTIONS  See nutrition interventions above    Goals  Patient to consume at least 50% of nutritionally adequate meal trays TID, or the equivalent with supplements/snacks.     Monitoring/Evaluation      Progress toward goals will be monitored and evaluated per policy.    Dominique Beltrán RD, LD  Clinical Dietitian - [facility]

## 2025-02-11 ENCOUNTER — ANESTHESIA EVENT (OUTPATIENT)
Dept: SURGERY | Facility: CLINIC | Age: 65
End: 2025-02-11
Payer: MEDICARE

## 2025-02-11 ENCOUNTER — APPOINTMENT (OUTPATIENT)
Dept: GENERAL RADIOLOGY | Facility: CLINIC | Age: 65
DRG: 853 | End: 2025-02-11
Attending: PODIATRIST
Payer: MEDICARE

## 2025-02-11 ENCOUNTER — ANESTHESIA (OUTPATIENT)
Dept: SURGERY | Facility: CLINIC | Age: 65
End: 2025-02-11
Payer: MEDICARE

## 2025-02-11 LAB
ANION GAP SERPL CALCULATED.3IONS-SCNC: 14 MMOL/L (ref 7–15)
BACTERIA SPEC CULT: NORMAL
BASOPHILS # BLD AUTO: 0 10E3/UL (ref 0–0.2)
BASOPHILS NFR BLD AUTO: 0 %
BUN SERPL-MCNC: 71.8 MG/DL (ref 8–23)
CALCIUM SERPL-MCNC: 10.1 MG/DL (ref 8.8–10.4)
CHLORIDE SERPL-SCNC: 95 MMOL/L (ref 98–107)
CREAT SERPL-MCNC: 5.85 MG/DL (ref 0.67–1.17)
EGFRCR SERPLBLD CKD-EPI 2021: 10 ML/MIN/1.73M2
EOSINOPHIL # BLD AUTO: 0.3 10E3/UL (ref 0–0.7)
EOSINOPHIL NFR BLD AUTO: 2 %
ERYTHROCYTE [DISTWIDTH] IN BLOOD BY AUTOMATED COUNT: 20.2 % (ref 10–15)
GLUCOSE BLDC GLUCOMTR-MCNC: 101 MG/DL (ref 70–99)
GLUCOSE BLDC GLUCOMTR-MCNC: 129 MG/DL (ref 70–99)
GLUCOSE BLDC GLUCOMTR-MCNC: 139 MG/DL (ref 70–99)
GLUCOSE BLDC GLUCOMTR-MCNC: 145 MG/DL (ref 70–99)
GLUCOSE BLDC GLUCOMTR-MCNC: 168 MG/DL (ref 70–99)
GLUCOSE BLDC GLUCOMTR-MCNC: 51 MG/DL (ref 70–99)
GLUCOSE BLDC GLUCOMTR-MCNC: 56 MG/DL (ref 70–99)
GLUCOSE BLDC GLUCOMTR-MCNC: 77 MG/DL (ref 70–99)
GLUCOSE SERPL-MCNC: 178 MG/DL (ref 70–99)
GRAM STAIN RESULT: NORMAL
GRAM STAIN RESULT: NORMAL
HCO3 SERPL-SCNC: 26 MMOL/L (ref 22–29)
HCT VFR BLD AUTO: 25.1 % (ref 40–53)
HGB BLD-MCNC: 8.3 G/DL (ref 13.3–17.7)
HOLD SPECIMEN: NORMAL
IMM GRANULOCYTES # BLD: 0.2 10E3/UL
IMM GRANULOCYTES NFR BLD: 1 %
LACTATE SERPL-SCNC: 1 MMOL/L (ref 0.7–2)
LYMPHOCYTES # BLD AUTO: 0.9 10E3/UL (ref 0.8–5.3)
LYMPHOCYTES NFR BLD AUTO: 5 %
MAGNESIUM SERPL-MCNC: 2 MG/DL (ref 1.7–2.3)
MCH RBC QN AUTO: 30.6 PG (ref 26.5–33)
MCHC RBC AUTO-ENTMCNC: 33.1 G/DL (ref 31.5–36.5)
MCV RBC AUTO: 93 FL (ref 78–100)
MONOCYTES # BLD AUTO: 1.1 10E3/UL (ref 0–1.3)
MONOCYTES NFR BLD AUTO: 7 %
NEUTROPHILS # BLD AUTO: 13.5 10E3/UL (ref 1.6–8.3)
NEUTROPHILS NFR BLD AUTO: 85 %
NRBC # BLD AUTO: 0 10E3/UL
NRBC BLD AUTO-RTO: 0 /100
PHOSPHATE SERPL-MCNC: 5.4 MG/DL (ref 2.5–4.5)
PLATELET # BLD AUTO: 545 10E3/UL (ref 150–450)
POTASSIUM SERPL-SCNC: 5.8 MMOL/L (ref 3.4–5.3)
RBC # BLD AUTO: 2.71 10E6/UL (ref 4.4–5.9)
SODIUM SERPL-SCNC: 135 MMOL/L (ref 135–145)
UFH PPP CHRO-ACNC: <0.1 IU/ML
WBC # BLD AUTO: 15.8 10E3/UL (ref 4–11)

## 2025-02-11 PROCEDURE — 82374 ASSAY BLOOD CARBON DIOXIDE: CPT | Performed by: INTERNAL MEDICINE

## 2025-02-11 PROCEDURE — 272N000001 HC OR GENERAL SUPPLY STERILE: Performed by: PODIATRIST

## 2025-02-11 PROCEDURE — 88307 TISSUE EXAM BY PATHOLOGIST: CPT | Mod: TC | Performed by: PODIATRIST

## 2025-02-11 PROCEDURE — 82310 ASSAY OF CALCIUM: CPT | Performed by: INTERNAL MEDICINE

## 2025-02-11 PROCEDURE — 250N000013 HC RX MED GY IP 250 OP 250 PS 637: Performed by: INTERNAL MEDICINE

## 2025-02-11 PROCEDURE — 87070 CULTURE OTHR SPECIMN AEROBIC: CPT | Performed by: PODIATRIST

## 2025-02-11 PROCEDURE — 36415 COLL VENOUS BLD VENIPUNCTURE: CPT | Performed by: HOSPITALIST

## 2025-02-11 PROCEDURE — 0Y6M0Z9 DETACHMENT AT RIGHT FOOT, PARTIAL 1ST RAY, OPEN APPROACH: ICD-10-PCS | Performed by: PODIATRIST

## 2025-02-11 PROCEDURE — 84100 ASSAY OF PHOSPHORUS: CPT | Performed by: INTERNAL MEDICINE

## 2025-02-11 PROCEDURE — 250N000013 HC RX MED GY IP 250 OP 250 PS 637

## 2025-02-11 PROCEDURE — 80048 BASIC METABOLIC PNL TOTAL CA: CPT | Performed by: INTERNAL MEDICINE

## 2025-02-11 PROCEDURE — 370N000017 HC ANESTHESIA TECHNICAL FEE, PER MIN: Performed by: PODIATRIST

## 2025-02-11 PROCEDURE — 0Y6M0ZF DETACHMENT AT RIGHT FOOT, PARTIAL 5TH RAY, OPEN APPROACH: ICD-10-PCS | Performed by: PODIATRIST

## 2025-02-11 PROCEDURE — 710N000010 HC RECOVERY PHASE 1, LEVEL 2, PER MIN: Performed by: PODIATRIST

## 2025-02-11 PROCEDURE — 999N000065 XR FOOT PORT RIGHT 2 VIEWS: Mod: RT

## 2025-02-11 PROCEDURE — 85520 HEPARIN ASSAY: CPT | Performed by: SURGERY

## 2025-02-11 PROCEDURE — 0Y6M0ZD DETACHMENT AT RIGHT FOOT, PARTIAL 4TH RAY, OPEN APPROACH: ICD-10-PCS | Performed by: PODIATRIST

## 2025-02-11 PROCEDURE — 99024 POSTOP FOLLOW-UP VISIT: CPT

## 2025-02-11 PROCEDURE — 87205 SMEAR GRAM STAIN: CPT | Performed by: PODIATRIST

## 2025-02-11 PROCEDURE — 90945 DIALYSIS ONE EVALUATION: CPT

## 2025-02-11 PROCEDURE — 250N000009 HC RX 250: Performed by: PODIATRIST

## 2025-02-11 PROCEDURE — 360N000083 HC SURGERY LEVEL 3 W/ FLUORO, PER MIN: Performed by: PODIATRIST

## 2025-02-11 PROCEDURE — 250N000013 HC RX MED GY IP 250 OP 250 PS 637: Performed by: HOSPITALIST

## 2025-02-11 PROCEDURE — 258N000003 HC RX IP 258 OP 636: Performed by: ANESTHESIOLOGY

## 2025-02-11 PROCEDURE — 250N000011 HC RX IP 250 OP 636: Performed by: INTERNAL MEDICINE

## 2025-02-11 PROCEDURE — 83605 ASSAY OF LACTIC ACID: CPT | Performed by: SURGERY

## 2025-02-11 PROCEDURE — 87075 CULTR BACTERIA EXCEPT BLOOD: CPT | Performed by: PODIATRIST

## 2025-02-11 PROCEDURE — 250N000013 HC RX MED GY IP 250 OP 250 PS 637: Performed by: PODIATRIST

## 2025-02-11 PROCEDURE — 0Y6M0ZC DETACHMENT AT RIGHT FOOT, PARTIAL 3RD RAY, OPEN APPROACH: ICD-10-PCS | Performed by: PODIATRIST

## 2025-02-11 PROCEDURE — 85004 AUTOMATED DIFF WBC COUNT: CPT | Performed by: HOSPITALIST

## 2025-02-11 PROCEDURE — 250N000011 HC RX IP 250 OP 636: Performed by: NURSE ANESTHETIST, CERTIFIED REGISTERED

## 2025-02-11 PROCEDURE — 28805 AMPUTATION THRU METATARSAL: CPT | Mod: RT | Performed by: PODIATRIST

## 2025-02-11 PROCEDURE — 0Y6M0ZB DETACHMENT AT RIGHT FOOT, PARTIAL 2ND RAY, OPEN APPROACH: ICD-10-PCS | Performed by: PODIATRIST

## 2025-02-11 PROCEDURE — 999N000141 HC STATISTIC PRE-PROCEDURE NURSING ASSESSMENT: Performed by: PODIATRIST

## 2025-02-11 PROCEDURE — 83735 ASSAY OF MAGNESIUM: CPT | Performed by: INTERNAL MEDICINE

## 2025-02-11 PROCEDURE — 99232 SBSQ HOSP IP/OBS MODERATE 35: CPT | Performed by: INTERNAL MEDICINE

## 2025-02-11 PROCEDURE — 36415 COLL VENOUS BLD VENIPUNCTURE: CPT | Performed by: SURGERY

## 2025-02-11 PROCEDURE — 250N000011 HC RX IP 250 OP 636: Mod: JZ | Performed by: PODIATRIST

## 2025-02-11 PROCEDURE — 99232 SBSQ HOSP IP/OBS MODERATE 35: CPT | Performed by: HOSPITALIST

## 2025-02-11 PROCEDURE — 120N000013 HC R&B IMCU

## 2025-02-11 PROCEDURE — 258N000003 HC RX IP 258 OP 636: Performed by: NURSE ANESTHETIST, CERTIFIED REGISTERED

## 2025-02-11 RX ORDER — PROPOFOL 10 MG/ML
INJECTION, EMULSION INTRAVENOUS CONTINUOUS PRN
Status: DISCONTINUED | OUTPATIENT
Start: 2025-02-11 | End: 2025-02-11

## 2025-02-11 RX ORDER — BUPIVACAINE HYDROCHLORIDE 5 MG/ML
INJECTION, SOLUTION EPIDURAL; INTRACAUDAL; PERINEURAL PRN
Status: DISCONTINUED | OUTPATIENT
Start: 2025-02-11 | End: 2025-02-11 | Stop reason: HOSPADM

## 2025-02-11 RX ORDER — ONDANSETRON 4 MG/1
4 TABLET, ORALLY DISINTEGRATING ORAL EVERY 30 MIN PRN
Status: DISCONTINUED | OUTPATIENT
Start: 2025-02-11 | End: 2025-02-11 | Stop reason: HOSPADM

## 2025-02-11 RX ORDER — DEXAMETHASONE SODIUM PHOSPHATE 4 MG/ML
4 INJECTION, SOLUTION INTRA-ARTICULAR; INTRALESIONAL; INTRAMUSCULAR; INTRAVENOUS; SOFT TISSUE
Status: DISCONTINUED | OUTPATIENT
Start: 2025-02-11 | End: 2025-02-11 | Stop reason: HOSPADM

## 2025-02-11 RX ORDER — FENTANYL CITRATE 50 UG/ML
50 INJECTION, SOLUTION INTRAMUSCULAR; INTRAVENOUS EVERY 5 MIN PRN
Status: DISCONTINUED | OUTPATIENT
Start: 2025-02-11 | End: 2025-02-11 | Stop reason: HOSPADM

## 2025-02-11 RX ORDER — HEPARIN SODIUM 10000 [USP'U]/100ML
0-5000 INJECTION, SOLUTION INTRAVENOUS CONTINUOUS
Status: DISCONTINUED | OUTPATIENT
Start: 2025-02-11 | End: 2025-02-16

## 2025-02-11 RX ORDER — ACETAMINOPHEN 325 MG/1
975 TABLET ORAL EVERY 8 HOURS
Status: DISCONTINUED | OUTPATIENT
Start: 2025-02-11 | End: 2025-03-01 | Stop reason: HOSPADM

## 2025-02-11 RX ORDER — BISACODYL 10 MG
10 SUPPOSITORY, RECTAL RECTAL DAILY PRN
Status: DISCONTINUED | OUTPATIENT
Start: 2025-02-14 | End: 2025-02-11

## 2025-02-11 RX ORDER — SODIUM CHLORIDE, SODIUM LACTATE, POTASSIUM CHLORIDE, CALCIUM CHLORIDE 600; 310; 30; 20 MG/100ML; MG/100ML; MG/100ML; MG/100ML
INJECTION, SOLUTION INTRAVENOUS CONTINUOUS
Status: DISCONTINUED | OUTPATIENT
Start: 2025-02-11 | End: 2025-02-11 | Stop reason: HOSPADM

## 2025-02-11 RX ORDER — SODIUM CHLORIDE 9 MG/ML
INJECTION, SOLUTION INTRAVENOUS CONTINUOUS
Status: DISCONTINUED | OUTPATIENT
Start: 2025-02-11 | End: 2025-02-11 | Stop reason: HOSPADM

## 2025-02-11 RX ORDER — FENTANYL CITRATE 50 UG/ML
25 INJECTION, SOLUTION INTRAMUSCULAR; INTRAVENOUS EVERY 5 MIN PRN
Status: DISCONTINUED | OUTPATIENT
Start: 2025-02-11 | End: 2025-02-11 | Stop reason: HOSPADM

## 2025-02-11 RX ORDER — LIDOCAINE 40 MG/G
CREAM TOPICAL
Status: DISCONTINUED | OUTPATIENT
Start: 2025-02-11 | End: 2025-02-11

## 2025-02-11 RX ORDER — GENTAMICIN SULFATE 1 MG/G
CREAM TOPICAL DAILY PRN
Status: DISCONTINUED | OUTPATIENT
Start: 2025-02-11 | End: 2025-02-15

## 2025-02-11 RX ORDER — NALOXONE HYDROCHLORIDE 0.4 MG/ML
0.1 INJECTION, SOLUTION INTRAMUSCULAR; INTRAVENOUS; SUBCUTANEOUS
Status: DISCONTINUED | OUTPATIENT
Start: 2025-02-11 | End: 2025-02-11 | Stop reason: HOSPADM

## 2025-02-11 RX ORDER — HYDROMORPHONE HCL IN WATER/PF 6 MG/30 ML
0.2 PATIENT CONTROLLED ANALGESIA SYRINGE INTRAVENOUS EVERY 5 MIN PRN
Status: DISCONTINUED | OUTPATIENT
Start: 2025-02-11 | End: 2025-02-11 | Stop reason: HOSPADM

## 2025-02-11 RX ORDER — AMOXICILLIN 250 MG
1 CAPSULE ORAL 2 TIMES DAILY
Status: DISCONTINUED | OUTPATIENT
Start: 2025-02-11 | End: 2025-02-19

## 2025-02-11 RX ORDER — HYDROMORPHONE HCL IN WATER/PF 6 MG/30 ML
0.4 PATIENT CONTROLLED ANALGESIA SYRINGE INTRAVENOUS EVERY 5 MIN PRN
Status: DISCONTINUED | OUTPATIENT
Start: 2025-02-11 | End: 2025-02-11 | Stop reason: HOSPADM

## 2025-02-11 RX ORDER — ONDANSETRON 2 MG/ML
4 INJECTION INTRAMUSCULAR; INTRAVENOUS EVERY 30 MIN PRN
Status: DISCONTINUED | OUTPATIENT
Start: 2025-02-11 | End: 2025-02-11 | Stop reason: HOSPADM

## 2025-02-11 RX ORDER — MAGNESIUM HYDROXIDE 1200 MG/15ML
LIQUID ORAL PRN
Status: DISCONTINUED | OUTPATIENT
Start: 2025-02-11 | End: 2025-02-11 | Stop reason: HOSPADM

## 2025-02-11 RX ORDER — CLINDAMYCIN PHOSPHATE 600 MG/50ML
600 INJECTION, SOLUTION INTRAVENOUS ONCE
Status: COMPLETED | OUTPATIENT
Start: 2025-02-11 | End: 2025-02-11

## 2025-02-11 RX ORDER — POLYETHYLENE GLYCOL 3350 17 G/17G
17 POWDER, FOR SOLUTION ORAL DAILY
Status: DISCONTINUED | OUTPATIENT
Start: 2025-02-12 | End: 2025-02-11

## 2025-02-11 RX ADMIN — CLOPIDOGREL BISULFATE 75 MG: 75 TABLET ORAL at 20:32

## 2025-02-11 RX ADMIN — SEVELAMER CARBONATE 800 MG: 800 TABLET, FILM COATED ORAL at 17:58

## 2025-02-11 RX ADMIN — Medication 5 MG: at 20:32

## 2025-02-11 RX ADMIN — PHENYLEPHRINE HYDROCHLORIDE 100 MCG: 10 INJECTION INTRAVENOUS at 11:04

## 2025-02-11 RX ADMIN — ATORVASTATIN CALCIUM 40 MG: 40 TABLET, FILM COATED ORAL at 20:32

## 2025-02-11 RX ADMIN — SODIUM ZIRCONIUM CYCLOSILICATE 10 G: 5 POWDER, FOR SUSPENSION ORAL at 16:05

## 2025-02-11 RX ADMIN — PROPOFOL 100 MCG/KG/MIN: 10 INJECTION, EMULSION INTRAVENOUS at 10:12

## 2025-02-11 RX ADMIN — INSULIN GLARGINE 20 UNITS: 100 INJECTION, SOLUTION SUBCUTANEOUS at 12:47

## 2025-02-11 RX ADMIN — HEPARIN SODIUM 1450 UNITS/HR: 10000 INJECTION, SOLUTION INTRAVENOUS at 00:17

## 2025-02-11 RX ADMIN — SENNOSIDES AND DOCUSATE SODIUM 1 TABLET: 50; 8.6 TABLET ORAL at 20:32

## 2025-02-11 RX ADMIN — Medication 12.5 MG: at 08:54

## 2025-02-11 RX ADMIN — ACETAMINOPHEN 975 MG: 325 TABLET, FILM COATED ORAL at 03:08

## 2025-02-11 RX ADMIN — PROPOFOL 20 MG: 10 INJECTION, EMULSION INTRAVENOUS at 10:13

## 2025-02-11 RX ADMIN — METHOCARBAMOL 500 MG: 500 TABLET ORAL at 19:27

## 2025-02-11 RX ADMIN — MIDODRINE HYDROCHLORIDE 10 MG: 2.5 TABLET ORAL at 08:54

## 2025-02-11 RX ADMIN — PANTOPRAZOLE SODIUM 40 MG: 40 TABLET, DELAYED RELEASE ORAL at 08:54

## 2025-02-11 RX ADMIN — ACETAMINOPHEN 975 MG: 325 TABLET, FILM COATED ORAL at 14:23

## 2025-02-11 RX ADMIN — MIDODRINE HYDROCHLORIDE 10 MG: 2.5 TABLET ORAL at 17:57

## 2025-02-11 RX ADMIN — CLINDAMYCIN PHOSPHATE 600 MG: 600 INJECTION, SOLUTION INTRAVENOUS at 09:59

## 2025-02-11 RX ADMIN — ACETAMINOPHEN 975 MG: 325 TABLET, FILM COATED ORAL at 20:32

## 2025-02-11 RX ADMIN — CALCITRIOL CAPSULES 0.25 MCG 0.25 MCG: 0.25 CAPSULE ORAL at 20:32

## 2025-02-11 RX ADMIN — OXYCODONE HYDROCHLORIDE 2.5 MG: 5 TABLET ORAL at 18:03

## 2025-02-11 RX ADMIN — PHENYLEPHRINE HYDROCHLORIDE 100 MCG: 10 INJECTION INTRAVENOUS at 11:00

## 2025-02-11 RX ADMIN — SODIUM CHLORIDE: 9 INJECTION, SOLUTION INTRAVENOUS at 09:58

## 2025-02-11 RX ADMIN — MIDAZOLAM 2 MG: 1 INJECTION INTRAMUSCULAR; INTRAVENOUS at 10:10

## 2025-02-11 RX ADMIN — PHENYLEPHRINE HYDROCHLORIDE 200 MCG: 10 INJECTION INTRAVENOUS at 11:22

## 2025-02-11 ASSESSMENT — ACTIVITIES OF DAILY LIVING (ADL)
ADLS_ACUITY_SCORE: 71

## 2025-02-11 ASSESSMENT — LIFESTYLE VARIABLES: TOBACCO_USE: 1

## 2025-02-11 NOTE — OP NOTE
PREOPERATIVE DIAGNOSES:     1.  Right digital gangrene, digits 1-5      POSTOPERATIVE DIAGNOSES:   1. Right digital gangrene, digits 1-5    PROCEDURE:     1.  Right foot transmetatarsal amputation       ANESTHESIA:  MAC with local.       HEMOSTASIS:  Electrocautery.       ESTIMATED BLOOD LOSS:  30 mL.       SPECIMENS:  Soft issue culture, forefoot for pathology       MATERIALS:  None    Indications: Patient has gangrene to digits 1-5 on the right foot. He also has gangrene to the heel and a gangrenous ulcer to the lateral foot. He has a recent bypass site that is working appropriately and strong, dopplarable pulses to the midfoot. Given this, the decision was made to do a transmetatarsal amputation, to resect the digital gangrene. Discussed risks and benefits at length, primarily that if he has incision complications, he'll likely need a BKA. He also will still have the heel ulcer that if it gets infected, would also result in need for a BKA. Patient agrees to all risks and benefits.     Procedure: Under mild sedation pt was brought into the OR & placed on the operating table in a supine position.  20 cc of .5% marcaine were injected into the ankle, completing an ankle block. The foot was scrubbed, prepped and draped in an aseptic manner. An ankle tourniquet was applied, but not inflated.      A standard TMA incision was made along the digital sulcus and the base of the digits on the forefoot. Ray. The freer elevator was then used to reflect all soft tissue, exposing each of the metatarsals. The sagittal saw was then used to cut through the metatarsals, starting with the first one and moving to the fifth one.This was continued in through and through cuts and the bones and digits were sharply excised and sent to pathology. All bleeders were ligated and cauterized as necessary.      All resected bone was sent to pathology.  All nonviable soft tissue was resected  insuring no necrotic tissue proximal to the amputation  remained. Next copious amounts of saline were used to flush the amputation site. The incision was then closed with 2.0 and 3.0 prolene suture.      Upon completion of suturing, a non-adhesive sterile dressing was then placed over the surgical site followed by 4 x 4s, kerlix, & an ACE wrap.      The pt tolerated the procedure & anesthesia well.  He was transferred to the recovery room with vital signs stable and vascular status intact to the right foot.  Following a period of post-op monitoring the pt will return to his hospital bed with the following written and oral post-op instructions:     Keep dressing clean, dry and intact.  Do not remove dressing.  NWB to R foot sx shoe. Heel use for pivots/transfers.  Elevate right foot at rest.  Contact Dr. Salas if any post-op questions or arrise.      Intraop findings: No significant signs of intra-op infection. Apparent appropriate blood flow for procedure.      Post op plan: Will follow up with patient on POD#1. Heparin restart at 1800. No further surgery planned per podiatry.

## 2025-02-11 NOTE — BRIEF OP NOTE
[facility]    Brief Operative Note    Pre-operative diagnosis: Type 2 diabetes mellitus with diabetic peripheral angiopathy with gangrene, unspecified whether long term insulin use (H) [E11.52]  Post-operative diagnosis Same as pre-operative diagnosis    Procedure: Right foot transmetatarsal amputation    Surgeon: Surgeons and Role:     * Alyce Salas DPM - Primary  Anesthesia: MAC   Estimated Blood Loss: 30 mL from 2/11/2025 10:10 AM to 2/11/2025 11:17 AM      Drains: None  Specimens:   ID Type Source Tests Collected by Time Destination   1 : RIGHT FORE FOOT Tissue Foot, Right SURGICAL PATHOLOGY EXAM Alyce Salas DPM 2/11/2025 10:39 AM    A : RIGHT FORE FOOT Tissue Foot, Right ANAEROBIC BACTERIAL CULTURE ROUTINE, GRAM STAIN, AEROBIC BACTERIAL CULTURE ROUTINE Alyce Salas DPM 2/11/2025 10:40 AM      Findings:   No significant signs of infection intra-op. Appeared to be appropriate blood flow.  Complications: None.  Implants: * No implants in log *

## 2025-02-11 NOTE — PROGRESS NOTES
VASCULAR SURGERY PROGRESS NOTE    Subjective:  No acute events overnight-intermittently confused. NPO for TMA today.     Objective:  Intake/Output Summary (Last 24 hours) at 2/11/2025 0252  Last data filed at 2/10/2025 1955  Gross per 24 hour   Intake --   Output 529 ml   Net -529 ml     PHYSICAL EXAM:  /80   Pulse 106   Temp 98.4  F (36.9  C)   Resp 20   Wt 74.9 kg (165 lb 2 oz)   SpO2 98%   BMI 21.79 kg/m    Alert and oriented x4, neurologically intact  Nonlabored breathing, on 0.5L NC, afebrile.  Abdomen remains soft, nondistended, nontender, groin incision intact, incisions intact  R lower extremity has monophasic(strong) AT signal with strong monophasic DP  Groin site remains soft, mildly tender, hematoma present-improved  Ongoing ischemic changes to toes    ASSESSMENT:  64 year old male who is POD13 s/p R fem-BK pop bypass with PTFE and proximal short GSV interposition graft. [place] [facility] [place] Worsening R pleural effusion of unclear etiology despite thoracentesis, most recent thoracentesis on 2/2. Patient opting for attempted limb salvage rather than amputation.    Labs pending this am    PLAN:  -per chart review, plan to tentatively switch to HD from PD this week  -oob, physical therapy  -ongoing leukocytosis, cbc not done, please draw full one today to get WBC and to continue trend  -TMA today w/ podiatry, NPO  -plavix, heparin gtt-heparin will be stopped per podiatry  -heel stable  -at this time limb salvage  -appreciate all teams involved      Cecilia Stearns NP  VASCULAR SURGERY

## 2025-02-11 NOTE — PLAN OF CARE
Date & Time: 1900-0730.  Summary:  Admitted 1/21 for right leg pain and found to have right leg ulcer   1/23 Diagnostic angiogram   1/24 Thoracentesis   1/27: Had right common femoral and popliteal tibial endarterectomy, right femoral to popliteal/ tibial PTFE bypass and admitted to ICU for mechanical ventilation   2/2 Thoracentesis   Behavior & Aggression: Green   Fall Risk: Yes   Orientation: A&Ox4  ABNL VS/O2:VSS on 1L of o2 overnight. Denied N/V.  ABNL Labs: see chart.  Pain Management: Scheduled Tylenol.  Bowel/Bladder: Pt on peritoneal dialysis, produces little urine, using urinal at bedside. No BM, passing little gas per pt.  IV/Drains: PIVs SL.  Skin: RLE incision sites are C/D/I. R heal and malleous black. R toes necrotic, rook boot in place. Blanchable redness to coccyx, mepilex in place. R groin site with hematoma (MD aware). Peritoneal site. Doppler pulses.  Diet: NPO since midnight.  Activity Level: not OOB, up w/ lift, Ax2w/ cares, T&R q2hrs as tolerated.  Tests/Procedures: Plan for possible Transmetatarsal amputation w/ podiatry today?.  Anticipated  DC Date: TBD.

## 2025-02-11 NOTE — ANESTHESIA CARE TRANSFER NOTE
Patient: Arnulfo Pritchett    Procedure: Procedure(s):  Right foot transmetatarsal amputation       Diagnosis: Type 2 diabetes mellitus with diabetic peripheral angiopathy with gangrene, unspecified whether long term insulin use (H) [E11.52]  Diagnosis Additional Information: No value filed.    Anesthesia Type:   MAC     Note:    Oropharynx: oropharynx clear of all foreign objects and spontaneously breathing  Level of Consciousness: drowsy  Oxygen Supplementation: face mask  Level of Supplemental Oxygen (L/min / FiO2): 6  Independent Airway: airway patency satisfactory and stable  Dentition: dentition unchanged  Vital Signs Stable: post-procedure vital signs reviewed and stable  Report to RN Given: handoff report given  Patient transferred to: PACU    Handoff Report: Identifed the Patient, Identified the Reponsible Provider, Reviewed the pertinent medical history, Discussed the surgical course, Reviewed Intra-OP anesthesia mangement and issues during anesthesia, Set expectations for post-procedure period and Allowed opportunity for questions and acknowledgement of understanding    Vitals:  Vitals Value Taken Time   BP 87/64 02/11/25 1121   Temp 36.9  C (98.42  F) 02/11/25 1124   Pulse 101 02/11/25 1124   Resp 16 02/11/25 1124   SpO2 100 % 02/11/25 1124   Vitals shown include unfiled device data.    Electronically Signed By: KEITH Patel CRNA  February 11, 2025  11:26 AM

## 2025-02-11 NOTE — PROGRESS NOTES
[facility]    Hospitalist Progress Note    Interval History   Patient was not personally seen since he was in surgery for transmetatarsal amputation.  Reviewed labs.    -Data reviewed today: I reviewed all new labs and imaging results over the last 24 hours. I personally reviewed the chest x-ray image(s) showing small right pleural effusion  .    Physical Exam   Temp: 97.6  F (36.4  C) Temp src: Axillary BP: 113/76 Pulse: 99   Resp: 20 SpO2: 100 % O2 Device: None (Room air) Oxygen Delivery: 1 LPM  Vitals:    02/08/25 2152 02/09/25 0714 02/11/25 0423   Weight: 79.6 kg (175 lb 7.8 oz) 74.9 kg (165 lb 2 oz) 85 kg (187 lb 6.3 oz)     Vital Signs with Ranges  Temp:  [97.6  F (36.4  C)-98.6  F (37  C)] 97.6  F (36.4  C)  Pulse:  [] 99  Resp:  [12-33] 20  BP: ()/(59-83) 113/76  SpO2:  [90 %-100 %] 100 %  I/O last 3 completed shifts:  In: 400 [I.V.:400]  Out: 579 [Urine:150; Other:399; Blood:30]        Medications   Current Facility-Administered Medications   Medication Dose Route Frequency Provider Last Rate Last Admin    dextrose 10% infusion   Intravenous Continuous PRN Walter Dempsey MD        dianeal PD LOW calcium-1.5% dex (calcium 2.5 mEq/L) 6,000 mL PERITONEAL DIALYSATE   Dialysis [facility] Supplied PD Harish Minor MD        heparin 25,000 units in 0.45% NaCl 250 mL ANTICOAGULANT infusion  0-5,000 Units/hr Intravenous Continuous Alyce Salas DPM        Patient is already receiving anticoagulation with heparin, enoxaparin (LOVENOX), warfarin (COUMADIN)  or other anticoagulant medication   Does not apply Continuous PRN Walter Dempsey MD         Current Facility-Administered Medications   Medication Dose Route Frequency Provider Last Rate Last Admin    - MEDICATION INSTRUCTIONS for Dialysis Patients -   Does not apply See Admin Instructions Walter Dempsey MD        acetaminophen (TYLENOL) tablet 975 mg  975 mg Oral Q8H Alyce Salas DPM   975 mg at  02/11/25 1423    [Held by provider] allopurinol (ZYLOPRIM) tablet 200 mg  200 mg Oral QPM Walter Dempsey MD   200 mg at 01/26/25 1957    atorvastatin (LIPITOR) tablet 40 mg  40 mg Oral or Feeding Tube At Bedtime Walter Dempsey MD   40 mg at 02/10/25 2035    calcitRIOL (ROCALTROL) capsule 0.25 mcg  0.25 mcg Oral At Bedtime Walter Dempsey MD   0.25 mcg at 02/10/25 2035    cinacalcet (SENSIPAR) tablet 60 mg  60 mg Oral Q Mon Wed Fri AM Walter Dempsey MD   60 mg at 02/10/25 2035    clopidogrel (PLAVIX) tablet 75 mg  75 mg Oral or Feeding Tube QPM Walter Dempsey MD   75 mg at 02/10/25 2035    epoetin evaristo-epbx (RETACRIT) injection 20,000 Units  20,000 Units Intravenous Weekly Jennifer Jameson MD   20,000 Units at 02/09/25 1739    insulin aspart (NovoLOG) injection (RAPID ACTING)   Subcutaneous TID w/meals Walter Dempsey MD   3 Units at 02/10/25 1839    insulin aspart (NovoLOG) injection (RAPID ACTING)  1-7 Units Subcutaneous TID AC Walter Dempsey MD   1 Units at 02/09/25 1101    insulin aspart (NovoLOG) injection (RAPID ACTING)  1-5 Units Subcutaneous At Bedtime Walter Dempsey MD   1 Units at 02/07/25 0040    insulin glargine (LANTUS PEN) injection 20 Units  20 Units Subcutaneous Daily Afshinu-Heidi Méndez MD   20 Units at 02/11/25 1247    melatonin tablet 5 mg  5 mg Oral At Bedtime Walter Dempsey MD   5 mg at 02/10/25 2035    metoprolol succinate ER (TOPROL-XL) 24 hr half-tab 12.5 mg  12.5 mg Oral Daily Larry Askew MD   12.5 mg at 02/11/25 0854    midodrine (PROAMATINE) tablet 10 mg  10 mg Oral BID w/meals Walter Dempsey MD   10 mg at 02/11/25 0854    multivitamin RENAL (TRIPHROCAPS) capsule 1 capsule  1 capsule Oral Daily Walter Dempsey MD   1 capsule at 02/10/25 1036    pantoprazole (PROTONIX) EC tablet 40 mg  [facility]   40 mg at 02/11/25 0854    polyethylene glycol (MIRALAX) Packet 17 g  17 g Oral or NG Tube Daily Walter Dempsey,  MD   17 g at 02/06/25 0839    senna-docusate (SENOKOT-S/PERICOLACE) 8.6-50 MG per tablet 1 tablet  1 tablet Oral BID Alyce Salas DPM        sevelamer carbonate (RENVELA) tablet 800 mg  800 mg Oral TID w/meals Walter Dempsey MD   800 mg at 02/10/25 1836    sodium chloride (PF) 0.9% PF flush 3 mL  3 mL Intracatheter Q8H Alyce Salas DPM   3 mL at 02/11/25 1303    sodium chloride (PF) 0.9% PF flush 3 mL  3 mL Intracatheter Q8H Walter Dempsey MD   3 mL at 02/11/25 0308       Data   Recent Labs   Lab 02/11/25  1137 02/11/25  0912 02/11/25  0804 02/11/25  0625 02/10/25  0908 02/10/25  0635 02/10/25  0231 02/09/25  2238 02/09/25  0756 02/09/25  0656 02/08/25  0813 02/08/25  0543   WBC  --  15.8*  --   --   --   --   --   --   --  15.2*  --  15.2*   HGB  --  8.3*  --   --   --  7.6*  --  7.9*   < > 8.2*  8.2*   < > 6.7*   MCV  --  93  --   --   --   --   --   --   --  92  --  94   PLT  --  545*  --   --   --   --   --   --   --  560*  --  488*   NA  --   --   --  135  --  134*  --   --   --  132*  --  130*   POTASSIUM  --   --   --  5.8*  --  5.1  --   --   --  5.1  --  4.8   CHLORIDE  --   --   --  95*  --  94*  --   --   --  92*  --  92*   CO2  --   --   --  26  --  26  --   --   --  25  --  25   BUN  --   --   --  71.8*  --  70.4*  --   --   --  71.2*  --  71.2*   CR  --   --   --  5.85*  --  5.94*  --   --   --  5.71*  --  5.87*   ANIONGAP  --   --   --  14  --  14  --   --   --  15  --  13   TERENCE  --   --   --  10.1  --  10.0  --   --   --  9.8  --  9.8   *  --  145* 178*   < > 137*   < >  --    < > 227*   < > 190*    < > = values in this interval not displayed.       No results found for this or any previous visit (from the past 24 hours).      Assessment & Plan      Arnulfo Pritchett is a 64 year old male with past medical history of severe heart failure (EF 30%), ESRD (on peritoneal dialysis), chronic paroxysmal AF on warfarin, LLE PAD s/p endarterectomy, RCC s/p nephrectomy admitted on  1/21/2025 for septic shock secondary to right leg PAD with worsening right heel necrotic ulcer. The patient was seen in ED at outside hospital on 1/3/25 and diagnosed with pneumonia, later completing course of Cefdinir. He then presented on 1/21/25 to outside hospital for right leg pain and found to have right leg ulcer and transferred to [facility] for vascular surgery evaluation. Now s/p right common femoral BK popliteal/tibial endarterectomy and right common femoral to below-knee popliteal/tibial PTFE bypass performed on 1/27/25. The patient was admitted to the ICU for requirement of mechanical ventilation and pressor support following surgery for multifactorial shock.       Hospitalist service consulted 1/31/2025 for transfer out of ICU and to assist with medical management along with vascular surgery, podiatry and Infectious disease.     Hx PAD of LLE s/p endarterectomy and fem-tibioperoneal trunk bypass on 10/25/24  Hx RLE arterial occlusion s/p SFA angioplasty and stent 5/2024  PAD RLE, right heel gangrene, occluded SFA, no evidence of osteomyelitis  s/p right common femoral and popliteal/tibial endarterectomy, right femoral to popliteal/tibial PTFE bypass 1/27/25   -Found to have critical limb ischemia on admission on January 21, for details see admission note.   -Distributive shock following surgery followed by the ICU service until 1/31.  -Followed by vascular surgery, podiatry, wound care, and ID.  -Coumadin has been held since admission, now on IV heparin, vascular surgery/podiatry to review when to restart warfarin pending any potential surgical intervention for worsening ischemic changes in toes of right foot as noted below.  -Has been maintained on statin and Plavix 75 mg daily during this hospital stay.  -Recent IV piperacillin/tazobactam (Zosyn), discontinued 2/2 per ID; follow-up cultures, monitor off antibiotics.  -Wound care per surgery and WOC.  -pain control, see hospital orders.  -worsening  ischemic changes in 2nd-5th toes of right foot.  Podiatry and vascular surgery following. Podiatry does not think these changes represent clear signs of infection. They think these correspond to his ongoing vascular diease. Per vascular, patient presents limb salvage approach than amputation.   -After discussion with the patient podiatry plans to proceed with transmetatarsal amputation on 2/11/2025.  Undergoing surgery today.  -Repeat CBC today shows persistent leukocytosis with white count at 15.8.  Continue to monitor.     Multifactorial shock, resolved  ICU admission, weaned off vasopressors 1/30.  -Transitioned to midodrine with increase in prior to admission dose on1/31.  -Monitor  vitals     End-stage renal disease (ESRD), on peritoneal dialysis  Hx of secondary hyperparathyroidism and hyperphosphatemia, phosphorous elevated above baseline of 6 at most recent of 7.9   Nephrology consulted, ongoing follow-up.  Did not require hemodialysis this hospital stay.  -Chronic peritoneal dialysis for the last 3.5 years  -Ongoing peritoneal dialysis, as per nephrology. Patient expressed interest to switch to HD. Nephrology discussed with his primary nephrologist who agrees to transition to hemodialysis.  IR consult has been placed for tunneled catheter placement for 2/12/2025.  Plan on possible hemodialysis from tomorrow.    -Continue midodrine.     Acute hypoxemic respiratory failure  Right pleural effusion  Status post thoracentesis 1/24/25, repeat 2/2/25  -Following surgery on admission by vascular surgery was intubated.  Recent pneumonia treated with 3 weeks of Ceftin prior to this admission.  No concern for pneumonia per intensivist service.  -Extubated 1/29/25.  -Right pleural effusion.  Status post thoracentesis1/24/25 with 1.5 L clear yellow fluid removed, likely transudative based on low cell count of 117, , and protein of 1.7. Possibly secondary to reduced peritoneal dialysis during hospitalization vs  underlying severe HFrEF (30%).  -CT chest 2/1/25, see report, no clear pneumonia; large right pleural effusion noted.  -Wean down oxygen as able, encourage incentive spirometry.  -Antibiotics as above, discontinued by ID 2/2.  -ID consulted 2/1 as above, recommend monitoring patient off antibiotics  -S/p repeat Right thoracentesis on 2/2 with 2.3 L of rolan fluid removed; additional studies, cultures (negative to date), cytology (negative for malignancy)      Paroxysmal atrial fibrillation  Chronic anticoagulation prior to admission, warfarin - ON HOLD  Ischemic cardiomyopathy with HLD, CAD s/p multiple stents, and severe EFrEF (30%)   Wide complex tachycardia 2/1/25  Assessment-postoperatively has been maintained on heparin drip.  Coumadin has been held during his hospital stay.  -Echo 1/31-EF 25 to 30%.  Severe global hypokinesis with abnormal septal motion consistent with conduction abnormality.  Severe inferior wall hypokinesis.  LVH.  RV mildly dilated and mildly reduced RV function.  Mild mitral regurgitation.  Mild to moderate mitral stenosis.  Moderate aortic stenosis.  Left ventricular fairly pressures elevated.  -Episode of wide-complex tachycardia noted by nursing staff, 15 beats, up on 2/1/2025.  -Continue inpatient telemetry.  -Consider follow-up cardiology consultation if recurrent wide-complex tachycardia in the setting of left ventricular dysfunction.  -Restarted prior to admission beta-blocker, Toprol XL, at adjusted dose, monitoring heart rate and blood pressure and consider titrating back up to PTA dose as indicated.  -Continue IV heparin anticoagulation, with future transition to oral Coumadin as prior to admission when appropriate from vascular surgery/podiatry standpoint.  Heparin drip was discontinued on 2/11/2025 in anticipation for surgery.  Will defer restarting this to podiatry.        Anemia of chronic disease  Baseline around 10.  Has been stable in the 8 range.  No signs of  "bleeding.  -Hemoglobin trended down to 6.7 on 2/8.  No signs of active bleed.  -s/p 1 unit PRBC  -Nephrology  started patient on EPO on 2/9 and to continue weekly.  -Continue to monitor hemoglobin daily, now stable.  Currently at 8.3  -Will transfuse for hemoglobin less than 7.        Hyperglycemia  Type 2 diabetes   hemoglobin A1c 5.7% October 24  On insulin prior to admission.  - increase lantus from 15U to 20U on 2/6/25 for optimal BG control.   -Continue insulin, adjust as needed.  -Monitor for hypoglycemia.  - continue to monitor BG     Mixed anion gap acidosis  -Improved respiratory function, monitor.  Anion gap resolved 1/31.     Abdominal discomfort 1/30, resolved  Noted to have some right upper quadrant pain 1/30.  Now resolved.  LFTs normal.  Was on Zosyn for above surgical issues.   -Monitor abdominal exam.  -Antibiotics managed as noted above.     Moderate protein calorie nutrition  -Nutrition consulted.  -Speech and language therapy (SLP) consulted, diet per speech therapy.     Gout  -PTA allopurinol currently on hold, reassess daily.     History of renal cell carcinoma   -Status post right nephrectomy, monitor.     History of obstructive sleep apnea.  By report, intolerant of CPAP   -Monitor, follow-up with outpatient provider.     Weakness and deconditioning  -PT, OT, speech and language therapy (SLP) consulted.  -Increase activity as tolerated.     Disposition  Anticipated discharge timing see Disposition Plan below.  -Anticipated discharge to transitional care unit.  -Social work consulted for discharge planning.     Goals of care discussion:  Patient had mentioned tonursing staff about \"wanting to die\", aand also mentioned to me he is tired of living like this and he did not think he was going to make it out of the hospital. Palliative consulted. When palliative saw patient the next day, patient appeared to be in a better mood and stated he wanted to continue restorative cares. He has however " mentioned to Nephrology that he will like to switch from peritoneal dialysis to HD as it will make him feel less fatigued , if however HD does not help, then he would consider comfort measures.         Clinically Significant Risk Factors     # Hyperkalemia: Highest K = 5.8 mmol/L in last 2 days, will monitor as appropriate  # Hyponatremia: Lowest Na = 134 mmol/L in last 2 days, will monitor as appropriate  # Hypochloremia: Lowest Cl = 94 mmol/L in last 2 days, will monitor as appropriate      # Hypoalbuminemia: Lowest albumin = 1.8 g/dL at 1/30/2025  3:17 PM, will monitor as appropriate     # Hypertension: Noted on problem list    # Chronic heart failure with reduced ejection fraction: last echo with EF <40%          # DMII: A1C = 6.6 % (Ref range: <5.7 %) within past 6 months    # Severe Malnutrition: based on nutrition assessment      # Financial/Environmental Concerns:            DVT Prophylaxis: Heparin SQ  Code Status: Full Code  Medically Ready for Discharge: Anticipated in 2-4 Days      Please see A&P for additional details of medical decision making.  35 MINUTES SPENT BY ME on the date of service doing chart review, history, exam, documentation & further activities per the note.    Ashley Roper MD, MD  [phone](p)

## 2025-02-11 NOTE — PLAN OF CARE
Date & Time: 2/10/25 9238-3038  Summary:  Admitted 1/21 for right leg pain and found to have right leg ulcer   1/23 Diagnostic angiogram   1/24 Thoracentesis   1/27: Had right common femoral and popliteal tibial endarterectomy, right femoral to popliteal/ tibial PTFE bypass and admitted to ICU for mechanical ventilation   2/2 Thoracentesis   Behavior & Aggression: Green   Fall Risk: Yes   Orientation: A/O  ABNL VS/O2:VSS on 1/2 L via NC   ABNL Labs: Hgb 7.6, Hep XA check for 0600   Pain Management: Scheduled Tylenol  Bowel/Bladder: Pt on peritoneal dialysis, produces little urine, using urinal at bedside.    Drains: PIV infusing heparin gtt at 1450 units/hr   Skin: RLE incison sites, CDI, R heal and malleous black, dressing changed today.  R toes necrotic, rook boot in place.  Blanchable redness to coccyx, mepilex in place. R groin site with hematoma (MD aware) and some erythema, firm to touch.     Diet: Regular, tolerating   Activity Level: Ax2 lift/ T/R Q2hrs   Anticipated  DC Date: Pending     Significant Information: Runs PD overnight.

## 2025-02-11 NOTE — PROGRESS NOTES
[facility]    Nephrology Progress Note     Assessment & Plan     Arnulfo Pritchett is a 64 year old male CAD, HTN, HLD, pafib, HFrEF (EF 20-25%), ESRD on PD, DM2, TALI, RCC s/p R nephrectomy (2022), PAD with multiple LE procedures      1) End stage renal disease on PD  Chronic PD for last 3.5 years.  Home unit [facility], nephrologist Dr. May  Rx: 5 cycles, 2 L fill volumes, 9 hours, last fill 1.2 L with Extraneal.       2) Moderate R pleural effusion: Status post thoracentesis on 1/24 and 2/2.  Transudative  Chest x-ray on 2/5 shows small layering effusion on right side     3) PAD, right heel gangrene, occluded right SFA.  Status post rt fem - below knee popliteal bypass this admission     4) Postsurgical anemia in patient with ESRD-  -status post blood transfusion.  Hemoglobin 7-8.   -EPO 20,000 units 2/9/25 and continue weekly                              Other issues-  Diabetes mellitus  Chronic paroxysmal atrial fibrillation  Secondary hyperparathyroidism and hyperphosphatemia  Severe heart failure with EF of 30% and mild RV dysfunction.      Discussion/plan:  Patient reports he is tired of doing PD and does not think he is thriving on it.  He wants to know if hemodialysis is going to be an option.  If not, he reports he has been thinking about pursuing palliative care, but would like to try hemodialysis first.     He was on hemodialysis before.  He switched to PD to better accommodate his work schedule.  He is no longer working.  He is very frail.  I discussed with him that hemodialysis will still be stressful to his body and he may not do too much better than he is doing on PD.  I spoke to Dr. May who confirms that he is not doing well on PD and she supports the move to HD.            Pt has not been getting extraneal here. UF is about 700 ml with 2.5% and around 2000 cc with mix of 4.5% - 2.5%        Plan:        PD order placed.   All 2.5% , 2 L fill x 5 over 9 hours  .     IR consult placed for tunneled catheter placement tomorrow.    HD run tomorrow.      [facility] for K 5.8.      Harish Minor MD  [facility] - Nephrology  [phone]    Interval History     He feels OK.  He just returned from [facility].  He is still interested in changing to hemodialysis.      UF 2300 mL last night     Physical Exam   Temp: 97.7  F (36.5  C) Temp src: Axillary BP: 109/78 Pulse: 101   Resp: 25 SpO2: 95 % O2 Device: Nasal cannula Oxygen Delivery: 2 LPM  Vitals:    02/08/25 2152 02/09/25 0714 02/11/25 0423   Weight: 79.6 kg (175 lb 7.8 oz) 74.9 kg (165 lb 2 oz) 85 kg (187 lb 6.3 oz)     Vital Signs with Ranges  Temp:  [97.7  F (36.5  C)-98.6  F (37  C)] 97.7  F (36.5  C)  Pulse:  [] 101  Resp:  [12-33] 25  BP: ()/(59-81) 109/78  SpO2:  [90 %-100 %] 95 %  I/O last 3 completed shifts:  In: -   Out: 549 [Urine:150; Other:399]    GENERAL APPEARANCE: pleasant, NAD, a & o  HEENT:  Eyes/ears/nose/neck grossly normal  RESP: lungs cta b c good efforts, no crackles, rhonchi or wheezes  CV: RRR, nl S1/S2, no m/r/g   ABDOMEN: o/s/nt/nd, bs present  EXTREMITIES/SKIN: no rashes/lesions; thigh edema    Medications   Current Facility-Administered Medications   Medication Dose Route Frequency Provider Last Rate Last Admin    dextrose 10% infusion   Intravenous Continuous PRN Walter Dempsey MD        dianeal PD LOW calcium-1.5% dex (calcium 2.5 mEq/L) 6,000 mL PERITONEAL DIALYSATE   Dialysis [facility] Supplied PD Harish Minor MD dianeal PD LOW calcium-2.5% dex (calcium 2.5 mEq/L) 6,000 mL PERITONEAL DIALYSATE   Dialysis [facility] Supplied PD Harish Minor MD        dianeal PD LOW calcium-4.25% dex (calcium 2.5 mEq/L) 6,000 mL PERITONEAL DIALYSATE   Dialysis [facility] Supplied PD Harish Minor MD        heparin 25,000 units in 0.45% NaCl 250 mL ANTICOAGULANT infusion  0-5,000 Units/hr Intravenous Continuous Alyce Salas DPM        Patient is already receiving anticoagulation  with heparin, enoxaparin (LOVENOX), warfarin (COUMADIN)  or other anticoagulant medication   Does not apply Continuous PRN Walter Dempsey MD         Current Facility-Administered Medications   Medication Dose Route Frequency Provider Last Rate Last Admin    - MEDICATION INSTRUCTIONS for Dialysis Patients -   Does not apply See Admin Instructions Waletr Dempsey MD        acetaminophen (TYLENOL) tablet 975 mg  975 mg Oral or Feeding Tube Q8H Walter Dempsey MD   975 mg at 02/11/25 0308    Or    acetaminophen (TYLENOL) Suppository 650 mg  650 mg Rectal Q8H Walter Dempsey MD        acetaminophen (TYLENOL) tablet 975 mg  975 mg Oral Q8H Alyce Salas DPM        [Held by provider] allopurinol (ZYLOPRIM) tablet 200 mg  200 mg Oral QPM Walter Dempsey MD   200 mg at 01/26/25 1957    atorvastatin (LIPITOR) tablet 40 mg  40 mg Oral or Feeding Tube At Bedtime Walter Dempsey MD   40 mg at 02/10/25 2035    calcitRIOL (ROCALTROL) capsule 0.25 mcg  0.25 mcg Oral At Bedtime Walter Dempsey MD   0.25 mcg at 02/10/25 2035    cinacalcet (SENSIPAR) tablet 60 mg  60 mg Oral Q Mon Wed Fri AM Walter Dempsey MD   60 mg at 02/10/25 2035    clopidogrel (PLAVIX) tablet 75 mg  75 mg Oral or Feeding Tube QPM Walter Dempsey MD   75 mg at 02/10/25 2035    epoetin evaristo-epbx (RETACRIT) injection 20,000 Units  20,000 Units Intravenous Weekly Jennifer Jameson MD   20,000 Units at 02/09/25 1739    insulin aspart (NovoLOG) injection (RAPID ACTING)   Subcutaneous TID w/meals Walter Dempsey MD   3 Units at 02/10/25 1839    insulin aspart (NovoLOG) injection (RAPID ACTING)  1-7 Units Subcutaneous TID AC Walter Dempsey MD   1 Units at 02/09/25 1101    insulin aspart (NovoLOG) injection (RAPID ACTING)  1-5 Units Subcutaneous At Bedtime Walter Dempsey MD   1 Units at 02/07/25 0040    insulin glargine (LANTUS PEN) injection 20 Units  20 Units Subcutaneous Daily Enu-Heidi Méndez MD   20 Units at 02/11/25  1247    melatonin tablet 5 mg  5 mg Oral At Bedtime Walter Dempsey MD   5 mg at 02/10/25 2035    metoprolol succinate ER (TOPROL-XL) 24 hr half-tab 12.5 mg  12.5 mg Oral Daily Larry Askew MD   12.5 mg at 02/11/25 0854    midodrine (PROAMATINE) tablet 10 mg  10 mg Oral BID w/meals Walter Dempsey MD   10 mg at 02/11/25 0854    multivitamin RENAL (TRIPHROCAPS) capsule 1 capsule  1 capsule Oral Daily Watler Dempsey MD   1 capsule at 02/10/25 1036    pantoprazole (PROTONIX) EC tablet 40 mg  40 mg Oral QAM  Carolina Alejandre RPH   40 mg at 02/11/25 0854    [START ON 2/12/2025] polyethylene glycol (MIRALAX) Packet 17 g  17 g Oral Daily Alyce Salas DPM        polyethylene glycol (MIRALAX) Packet 17 g  17 g Oral or NG Tube Daily Walter Dempsey MD   17 g at 02/06/25 0839    senna-docusate (SENOKOT-S/PERICOLACE) 8.6-50 MG per tablet 1 tablet  1 tablet Oral BID Alyce Salas DPM        senna-docusate (SENOKOT-S/PERICOLACE) 8.6-50 MG per tablet 1 tablet  1 tablet Oral or NG Tube BID Walter Dempsey MD   1 tablet at 02/09/25 2108    sevelamer carbonate (RENVELA) tablet 800 mg  800 mg Oral TID w/meals Walter Dempsey MD   800 mg at 02/10/25 1836    sodium chloride (PF) 0.9% PF flush 3 mL  3 mL Intracatheter Q8H Alyce Salas DPM   3 mL at 02/11/25 1303    sodium chloride (PF) 0.9% PF flush 3 mL  3 mL Intracatheter Q8H Walter Dempsey MD   3 mL at 02/11/25 0308    sodium zirconium cyclosilicate (LOKELMA) packet 10 g  10 g Oral Once Harish Minor MD           Data   BMP  Recent Labs   Lab 02/11/25  1137 02/11/25  0804 02/11/25  0625 02/11/25  0615 02/10/25  0908 02/10/25  0635 02/09/25  0756 02/09/25  0656 02/08/25  0813 02/08/25  0543   NA  --   --  135  --   --  134*  --  132*  --  130*   POTASSIUM  --   --  5.8*  --   --  5.1  --  5.1  --  4.8   CHLORIDE  --   --  95*  --   --  94*  --  92*  --  92*   TERENCE  --   --  10.1  --   --  10.0  --  9.8  --  9.8   CO2  --   --   26  --   --  26  --  25  --  25   BUN  --   --  71.8*  --   --  70.4*  --  71.2*  --  71.2*   CR  --   --  5.85*  --   --  5.94*  --  5.71*  --  5.87*   * 145* 178* 168*   < > 137*   < > 227*   < > 190*    < > = values in this interval not displayed.     [email or web address](phos:4)  CBC)  Recent Labs   Lab 02/11/25  0912 02/10/25  0635 02/09/25  2238 02/09/25  1446 02/09/25  0656 02/08/25  1914 02/08/25  0543 02/07/25  0607   WBC 15.8*  --   --   --  15.2*  --  15.2* 19.7*   HGB 8.3* 7.6* 7.9* 8.1* 8.2*  8.2*   < > 6.7* 7.6*   HCT 25.1*  --   --   --  25.3*  --  20.2* 23.4*   MCV 93  --   --   --  92  --  94 96   *  --   --   --  560*  --  488* 531*    < > = values in this interval not displayed.         Attestation:   I have reviewed today's relevant vital signs, notes, medications, labs and imaging.

## 2025-02-11 NOTE — PROGRESS NOTES
Cycler set up per protocol and per treatment orders     Results from previous treatment:  Effluent color and clarity: yellow and clear, no fibrin noted  Total UF: 223 ml  Initial Drain: 86 ml  Avg Dwell: 1:22  Lost Dwell: 0:01    Cycler Serial Number: [id number]  Total Treatment Volume: 99969 mL  Total treatment time: 9 hrs  Fill Volume: 0 ml  Last Fill Volume:0 ml  Heater ba.5 % 6000mL;  Lot #: [id number];  Expiration Date: 2026  Side Bag #1: 2.5 % 6000mL;  Lot #: [id number];  Expiration Date: 2025    Number of cycles including final fill: 5  Dwell Time: 1:22 minutes  Last Fill Volume: 0 ml  Initial Drain Alarm: 0 ml  PD orders reviewed with Patient procedure and ESRD teaching done and questions answered.  Cycler ready for hook-up.    Report given to: Charge Nurse tower 2

## 2025-02-11 NOTE — ANESTHESIA POSTPROCEDURE EVALUATION
Patient: Arnulfo Pritchett    Procedure: Procedure(s):  Right foot transmetatarsal amputation       Anesthesia Type:  MAC    Note:  Disposition: Inpatient   Postop Pain Control: Uneventful            Sign Out: Well controlled pain   PONV: No   Neuro/Psych: Uneventful            Sign Out: Acceptable/Baseline neuro status   Airway/Respiratory: Uneventful            Sign Out: Acceptable/Baseline resp. status   CV/Hemodynamics: Uneventful            Sign Out: Acceptable CV status   Other NRE:    DID A NON-ROUTINE EVENT OCCUR? No           Last vitals:  Vitals Value Taken Time   /72 02/11/25 1215   Temp 36.6  C (97.88  F) 02/11/25 1223   Pulse 101 02/11/25 1223   Resp 23 02/11/25 1223   SpO2 100 % 02/11/25 1223   Vitals shown include unfiled device data.    Electronically Signed By: Magali Allen  February 11, 2025  1:46 PM

## 2025-02-11 NOTE — ANESTHESIA PREPROCEDURE EVALUATION
Anesthesia Pre-Procedure Evaluation    Patient: Arnulfo Pritchett   MRN: [id number] : 1960        Procedure : Procedure(s):  Left foot transmetatarsal amputation          Past Medical History:   Diagnosis Date    CAD (coronary artery disease)     Chronic systolic heart failure (H)     ESRD (end stage renal disease) on dialysis (H)     Gastroesophageal reflux disease without esophagitis     Gout     HLD (hyperlipidemia)     TALI (obstructive sleep apnea)     PAD (peripheral artery disease)     S/p RLE stenting of SFA/popliteal arteries    Type 2 diabetes mellitus with diabetic neuropathy (H)       Past Surgical History:   Procedure Laterality Date    AMPUTATE TOE(S) Left 10/27/2024    Procedure: AMPUTATION, TOE left great toe;  Surgeon: Haley Joseph DPM, Podiatry/Foot and Ankle Surgery;  Location: [facility] OR    BYPASS GRAFT FEMOROPOPLITEAL Right 2025    Procedure: RIGHT FEMORAL ARTERY TO RIGHT POPLITEAL ARTERY ENDARTERECTOMY,  COMMON FEMORAL TO POPLITEAL BELOW KNEE COMPOSITE GREATER SAPHENOUS/PTFE BYPASS GRAFT. GREATER SAPHENOUS VEIN HARVEST;  Surgeon: Patrick Hein MD;  Location: [facility] OR    BYPASS GRAFT FEMOROTIBIAL Left 10/25/2024    Procedure: LEFT FEMORAL ENDARTERECTOMY, LEFT FEMORAL TO TIBIAL PERONEAL TRUNK BYPASS WITH LEFT GREAT SEPHANEOUS VEIN, WOUND VAC PLACEMENT ON LEFT GROIN.;  Surgeon: Raul Gray MD;  Location: [facility] OR    BYPASS GRAFT FEMOROTIBIAL Left 10/27/2024    Procedure: CREATION, BYPASS, VASCULAR, FEMORAL TO TIBIAL REVISION OF BYPASS LEFT COMMON FEMORAL TO TIBIAL.;  Surgeon: Raul Gray MD;  Location:  OR    CV CORONARY ANGIOGRAM N/A 2024    Procedure: Coronary Angiogram;  Surgeon: Clem Matt MD;  Location:  [facility]    CV LOWER EXTREMITY ANGIOGRAM LEFT Left 10/27/2024    Procedure: Angiogram Lower Extremity Left;  Surgeon: Raul Gray MD;  Location:  OR    CV PCI N/A 2024    Procedure: Percutaneous Coronary  Intervention;  Surgeon: Clem Matt MD;  Location:  [facility] CATH LAB    IR LOWER EXTREMITY ANGIOGRAM LEFT  10/17/2024    IR LOWER EXTREMITY ANGIOGRAM RIGHT  1/23/2025    OR ANGIOGRAM, LOWER EXTREMITY Right 1/27/2025    Procedure: INTRAOPERATIVE ANGIOGRAM;  Surgeon: Patrick Hein MD;  Location:  OR      No Known Allergies   Social History     Tobacco Use    Smoking status: Former     Types: Cigarettes    Smokeless tobacco: Never   Substance Use Topics    Alcohol use: Not Currently     Comment: quit 20 years      Wt Readings from Last 1 Encounters:   02/11/25 85 kg (187 lb 6.3 oz)        Anesthesia Evaluation   Pt has had prior anesthetic.     No history of anesthetic complications       ROS/MED HX  ENT/Pulmonary: Comment: Recent right pleural effusion s/p thoracentesis on 1/24/25. Patient reports increased WOB.    (+) sleep apnea,               tobacco use,              recent URI, resolved, on 1/3, s/p treatment:        Neurologic:     (+)              TIA,  features: no residual symptoms.                Cardiovascular:     (+) Dyslipidemia hypertension- Peripheral Vascular Disease (s/p left common femoral artery endarterecomy and femoral-pop bypass in 10/2024)-- Other and Symptomatic.  CAD -  - stent-      CHF etiology: ICM Last EF: 20-25 date: 6/2024                             METS/Exercise Tolerance:     Hematologic:       Musculoskeletal:       GI/Hepatic:     (+) GERD,                   Renal/Genitourinary: Comment: S/p right nephrectomy d/t RCC    (+) renal disease, type: ESRD, Pt requires dialysis, type: Peritoneal dialysis,          Endo:     (+)  type II DM, Last HgA1c: 5.7, date: 10/2024,                  Psychiatric/Substance Use:       Infectious Disease:       Malignancy:   (+) Malignancy, History of Other.Other CA RCC s/p right nephrectomy Remission status post Surgery.    Other:            Physical Exam    Airway        Mallampati: II   TM distance: > 3 FB   Neck ROM:  "full   Mouth opening: > 3 cm    Respiratory Devices and Support         Dental       (+) Multiple crowns, permanant bridges      Cardiovascular          Rhythm and rate: tachycardia     Pulmonary       Comment: No breath sounds heard in RLL    (-) no decreased breath sounds        OUTSIDE LABS:  CBC:   Lab Results   Component Value Date    WBC 15.8 (H) 02/11/2025    WBC 15.2 (H) 02/09/2025    HGB 8.3 (L) 02/11/2025    HGB 7.6 (L) 02/10/2025    HCT 25.1 (L) 02/11/2025    HCT 25.3 (L) 02/09/2025     (H) 02/11/2025     (H) 02/09/2025     BMP:   Lab Results   Component Value Date     02/11/2025     (L) 02/10/2025    POTASSIUM 5.8 (H) 02/11/2025    POTASSIUM 5.1 02/10/2025    CHLORIDE 95 (L) 02/11/2025    CHLORIDE 94 (L) 02/10/2025    CO2 26 02/11/2025    CO2 26 02/10/2025    BUN 71.8 (H) 02/11/2025    BUN 70.4 (H) 02/10/2025    CR 5.85 (H) 02/11/2025    CR 5.94 (H) 02/10/2025     (H) 02/11/2025     (H) 02/11/2025     COAGS:   Lab Results   Component Value Date    PTT 42 (H) 01/27/2025    INR 1.44 (H) 02/01/2025     POC: No results found for: \"BGM\", \"HCG\", \"HCGS\"  HEPATIC:   Lab Results   Component Value Date    ALBUMIN 1.8 (L) 01/30/2025    PROTTOTAL 4.7 (L) 02/02/2025    ALT 6 01/30/2025    AST 12 01/30/2025    ALKPHOS 115 01/30/2025    BILITOTAL 0.2 01/30/2025     OTHER:   Lab Results   Component Value Date    PH 7.31 (L) 02/01/2025    LACT 0.9 02/06/2025    A1C 6.6 (H) 01/31/2025    TERENCE 10.1 02/11/2025    PHOS 5.4 (H) 02/11/2025    MAG 2.0 02/11/2025    TSH 6.45 (H) 11/27/2024    T4 1.38 11/27/2024    SED 86 (H) 01/21/2025       Anesthesia Plan    ASA Status:  4    NPO Status:  NPO Appropriate    Anesthesia Type: MAC.              Consents    Anesthesia Plan(s) and associated risks, benefits, and realistic alternatives discussed. Questions answered and patient/representative(s) expressed understanding.     - Discussed: Risks, Benefits and Alternatives for BOTH SEDATION and the " PROCEDURE were discussed     - Discussed with:  Patient            Postoperative Care            Comments:               Magali Allen    I have reviewed the pertinent notes and labs in the chart from the past 30 days and (re)examined the patient.  Any updates or changes from those notes are reflected in this note.    Clinically Significant Risk Factors        # Hyperkalemia: Highest K = 5.8 mmol/L in last 2 days, will monitor as appropriate  # Hyponatremia: Lowest Na = 134 mmol/L in last 2 days, will monitor as appropriate  # Hypochloremia: Lowest Cl = 94 mmol/L in last 2 days, will monitor as appropriate      # Hypoalbuminemia: Lowest albumin = 1.8 g/dL at 1/30/2025  3:17 PM, will monitor as appropriate     # Hypertension: Noted on problem list  # Chronic heart failure with reduced ejection fraction: last echo with EF <40%          # DMII: A1C = 6.6 % (Ref range: <5.7 %) within past 6 months    # Severe Malnutrition: based on nutrition assessment    # Financial/Environmental Concerns:

## 2025-02-12 ENCOUNTER — APPOINTMENT (OUTPATIENT)
Dept: INTERVENTIONAL RADIOLOGY/VASCULAR | Facility: CLINIC | Age: 65
End: 2025-02-12
Attending: INTERNAL MEDICINE
Payer: MEDICARE

## 2025-02-12 ENCOUNTER — DOCUMENTATION ONLY (OUTPATIENT)
Dept: ORTHOPEDICS | Facility: CLINIC | Age: 65
End: 2025-02-12
Payer: COMMERCIAL

## 2025-02-12 LAB
ALBUMIN SERPL BCG-MCNC: 1.6 G/DL (ref 3.5–5.2)
ANION GAP SERPL CALCULATED.3IONS-SCNC: 12 MMOL/L (ref 7–15)
ATRIAL RATE - MUSE: 99 BPM
BUN SERPL-MCNC: 69.8 MG/DL (ref 8–23)
CALCIUM SERPL-MCNC: 9.6 MG/DL (ref 8.8–10.4)
CHLORIDE SERPL-SCNC: 96 MMOL/L (ref 98–107)
CREAT SERPL-MCNC: 5.94 MG/DL (ref 0.67–1.17)
DIASTOLIC BLOOD PRESSURE - MUSE: NORMAL MMHG
EGFRCR SERPLBLD CKD-EPI 2021: 10 ML/MIN/1.73M2
ERYTHROCYTE [DISTWIDTH] IN BLOOD BY AUTOMATED COUNT: 20.5 % (ref 10–15)
GLUCOSE BLDC GLUCOMTR-MCNC: 109 MG/DL (ref 70–99)
GLUCOSE BLDC GLUCOMTR-MCNC: 154 MG/DL (ref 70–99)
GLUCOSE BLDC GLUCOMTR-MCNC: 92 MG/DL (ref 70–99)
GLUCOSE BLDC GLUCOMTR-MCNC: 97 MG/DL (ref 70–99)
GLUCOSE SERPL-MCNC: 116 MG/DL (ref 70–99)
HCO3 SERPL-SCNC: 26 MMOL/L (ref 22–29)
HCT VFR BLD AUTO: 23.6 % (ref 40–53)
HGB BLD-MCNC: 7.5 G/DL (ref 13.3–17.7)
INR PPP: 1.35 (ref 0.85–1.15)
INTERPRETATION ECG - MUSE: NORMAL
MAGNESIUM SERPL-MCNC: 1.8 MG/DL (ref 1.7–2.3)
MCH RBC QN AUTO: 30.1 PG (ref 26.5–33)
MCHC RBC AUTO-ENTMCNC: 31.8 G/DL (ref 31.5–36.5)
MCV RBC AUTO: 95 FL (ref 78–100)
P AXIS - MUSE: 91 DEGREES
PHOSPHATE SERPL-MCNC: 5.6 MG/DL (ref 2.5–4.5)
PLATELET # BLD AUTO: 555 10E3/UL (ref 150–450)
POTASSIUM SERPL-SCNC: 4.9 MMOL/L (ref 3.4–5.3)
PR INTERVAL - MUSE: 198 MS
QRS DURATION - MUSE: 106 MS
QT - MUSE: 364 MS
QTC - MUSE: 467 MS
R AXIS - MUSE: 266 DEGREES
RBC # BLD AUTO: 2.49 10E6/UL (ref 4.4–5.9)
SODIUM SERPL-SCNC: 134 MMOL/L (ref 135–145)
SYSTOLIC BLOOD PRESSURE - MUSE: NORMAL MMHG
T AXIS - MUSE: 84 DEGREES
UFH PPP CHRO-ACNC: 0.32 IU/ML
UFH PPP CHRO-ACNC: 0.4 IU/ML
UFH PPP CHRO-ACNC: <0.1 IU/ML
VENTRICULAR RATE- MUSE: 99 BPM
WBC # BLD AUTO: 15 10E3/UL (ref 4–11)

## 2025-02-12 PROCEDURE — 250N000011 HC RX IP 250 OP 636: Performed by: PHYSICIAN ASSISTANT

## 2025-02-12 PROCEDURE — 99232 SBSQ HOSP IP/OBS MODERATE 35: CPT | Performed by: HOSPITALIST

## 2025-02-12 PROCEDURE — 93010 ELECTROCARDIOGRAM REPORT: CPT | Performed by: INTERNAL MEDICINE

## 2025-02-12 PROCEDURE — 250N000011 HC RX IP 250 OP 636: Performed by: RADIOLOGY

## 2025-02-12 PROCEDURE — 250N000013 HC RX MED GY IP 250 OP 250 PS 637: Performed by: INTERNAL MEDICINE

## 2025-02-12 PROCEDURE — 85520 HEPARIN ASSAY: CPT | Performed by: STUDENT IN AN ORGANIZED HEALTH CARE EDUCATION/TRAINING PROGRAM

## 2025-02-12 PROCEDURE — 36415 COLL VENOUS BLD VENIPUNCTURE: CPT | Performed by: HOSPITALIST

## 2025-02-12 PROCEDURE — 83735 ASSAY OF MAGNESIUM: CPT | Performed by: INTERNAL MEDICINE

## 2025-02-12 PROCEDURE — 0JH63XZ INSERTION OF TUNNELED VASCULAR ACCESS DEVICE INTO CHEST SUBCUTANEOUS TISSUE AND FASCIA, PERCUTANEOUS APPROACH: ICD-10-PCS | Performed by: RADIOLOGY

## 2025-02-12 PROCEDURE — 90945 DIALYSIS ONE EVALUATION: CPT

## 2025-02-12 PROCEDURE — 120N000013 HC R&B IMCU

## 2025-02-12 PROCEDURE — 272N000196 HC ACCESSORY CR5

## 2025-02-12 PROCEDURE — 85018 HEMOGLOBIN: CPT | Performed by: INTERNAL MEDICINE

## 2025-02-12 PROCEDURE — 250N000013 HC RX MED GY IP 250 OP 250 PS 637: Performed by: PODIATRIST

## 2025-02-12 PROCEDURE — 99152 MOD SED SAME PHYS/QHP 5/>YRS: CPT

## 2025-02-12 PROCEDURE — 02HV33Z INSERTION OF INFUSION DEVICE INTO SUPERIOR VENA CAVA, PERCUTANEOUS APPROACH: ICD-10-PCS | Performed by: RADIOLOGY

## 2025-02-12 PROCEDURE — 36415 COLL VENOUS BLD VENIPUNCTURE: CPT | Performed by: STUDENT IN AN ORGANIZED HEALTH CARE EDUCATION/TRAINING PROGRAM

## 2025-02-12 PROCEDURE — C1769 GUIDE WIRE: HCPCS

## 2025-02-12 PROCEDURE — 250N000009 HC RX 250: Performed by: PHYSICIAN ASSISTANT

## 2025-02-12 PROCEDURE — 272N000186

## 2025-02-12 PROCEDURE — 85610 PROTHROMBIN TIME: CPT | Performed by: INTERNAL MEDICINE

## 2025-02-12 PROCEDURE — 85520 HEPARIN ASSAY: CPT | Performed by: SURGERY

## 2025-02-12 PROCEDURE — 93005 ELECTROCARDIOGRAM TRACING: CPT

## 2025-02-12 PROCEDURE — 250N000013 HC RX MED GY IP 250 OP 250 PS 637

## 2025-02-12 PROCEDURE — 99232 SBSQ HOSP IP/OBS MODERATE 35: CPT | Performed by: INTERNAL MEDICINE

## 2025-02-12 PROCEDURE — 80069 RENAL FUNCTION PANEL: CPT | Performed by: INTERNAL MEDICINE

## 2025-02-12 PROCEDURE — 36558 INSERT TUNNELED CV CATH: CPT

## 2025-02-12 PROCEDURE — 36415 COLL VENOUS BLD VENIPUNCTURE: CPT | Performed by: INTERNAL MEDICINE

## 2025-02-12 PROCEDURE — 84132 ASSAY OF SERUM POTASSIUM: CPT | Performed by: INTERNAL MEDICINE

## 2025-02-12 PROCEDURE — 250N000011 HC RX IP 250 OP 636: Performed by: PODIATRIST

## 2025-02-12 PROCEDURE — L3260 AMBULATORY SURGICAL BOOT EAC: HCPCS

## 2025-02-12 PROCEDURE — 250N000013 HC RX MED GY IP 250 OP 250 PS 637: Performed by: HOSPITALIST

## 2025-02-12 PROCEDURE — 85520 HEPARIN ASSAY: CPT | Performed by: HOSPITALIST

## 2025-02-12 RX ORDER — CEFAZOLIN SODIUM 2 G/100ML
2 INJECTION, SOLUTION INTRAVENOUS
Status: COMPLETED | OUTPATIENT
Start: 2025-02-12 | End: 2025-02-12

## 2025-02-12 RX ORDER — NALOXONE HYDROCHLORIDE 0.4 MG/ML
0.2 INJECTION, SOLUTION INTRAMUSCULAR; INTRAVENOUS; SUBCUTANEOUS
Status: DISCONTINUED | OUTPATIENT
Start: 2025-02-12 | End: 2025-02-12 | Stop reason: HOSPADM

## 2025-02-12 RX ORDER — FENTANYL CITRATE 50 UG/ML
25-50 INJECTION, SOLUTION INTRAMUSCULAR; INTRAVENOUS EVERY 5 MIN PRN
Status: DISCONTINUED | OUTPATIENT
Start: 2025-02-12 | End: 2025-02-12 | Stop reason: HOSPADM

## 2025-02-12 RX ORDER — HEPARIN SODIUM 1000 [USP'U]/ML
2000-7000 INJECTION, SOLUTION INTRAVENOUS; SUBCUTANEOUS ONCE
Status: COMPLETED | OUTPATIENT
Start: 2025-02-12 | End: 2025-02-12

## 2025-02-12 RX ORDER — FLUMAZENIL 0.1 MG/ML
0.2 INJECTION, SOLUTION INTRAVENOUS
Status: DISCONTINUED | OUTPATIENT
Start: 2025-02-12 | End: 2025-02-12 | Stop reason: HOSPADM

## 2025-02-12 RX ORDER — NALOXONE HYDROCHLORIDE 0.4 MG/ML
0.4 INJECTION, SOLUTION INTRAMUSCULAR; INTRAVENOUS; SUBCUTANEOUS
Status: DISCONTINUED | OUTPATIENT
Start: 2025-02-12 | End: 2025-02-12 | Stop reason: HOSPADM

## 2025-02-12 RX ADMIN — HEPARIN SODIUM 3200 UNITS: 1000 INJECTION INTRAVENOUS; SUBCUTANEOUS at 11:02

## 2025-02-12 RX ADMIN — ACETAMINOPHEN 975 MG: 325 TABLET, FILM COATED ORAL at 20:10

## 2025-02-12 RX ADMIN — OXYCODONE HYDROCHLORIDE 2.5 MG: 5 TABLET ORAL at 14:58

## 2025-02-12 RX ADMIN — LIDOCAINE HYDROCHLORIDE 9 ML: 10 INJECTION, SOLUTION INFILTRATION; PERINEURAL at 10:51

## 2025-02-12 RX ADMIN — Medication 12.5 MG: at 08:46

## 2025-02-12 RX ADMIN — MIDAZOLAM 1 MG: 1 INJECTION INTRAMUSCULAR; INTRAVENOUS at 10:50

## 2025-02-12 RX ADMIN — CALCITRIOL CAPSULES 0.25 MCG 0.25 MCG: 0.25 CAPSULE ORAL at 21:27

## 2025-02-12 RX ADMIN — METHOCARBAMOL 500 MG: 500 TABLET ORAL at 20:09

## 2025-02-12 RX ADMIN — MIDODRINE HYDROCHLORIDE 10 MG: 2.5 TABLET ORAL at 17:00

## 2025-02-12 RX ADMIN — MIDODRINE HYDROCHLORIDE 10 MG: 2.5 TABLET ORAL at 08:46

## 2025-02-12 RX ADMIN — ACETAMINOPHEN 975 MG: 325 TABLET, FILM COATED ORAL at 12:31

## 2025-02-12 RX ADMIN — Medication 1 CAPSULE: at 08:46

## 2025-02-12 RX ADMIN — PANTOPRAZOLE SODIUM 40 MG: 40 TABLET, DELAYED RELEASE ORAL at 08:46

## 2025-02-12 RX ADMIN — CLOPIDOGREL BISULFATE 75 MG: 75 TABLET ORAL at 20:10

## 2025-02-12 RX ADMIN — LIDOCAINE HYDROCHLORIDE 6 ML: 10; .005 INJECTION, SOLUTION EPIDURAL; INFILTRATION; INTRACAUDAL; PERINEURAL at 10:55

## 2025-02-12 RX ADMIN — ATORVASTATIN CALCIUM 40 MG: 40 TABLET, FILM COATED ORAL at 20:10

## 2025-02-12 RX ADMIN — OXYCODONE HYDROCHLORIDE 2.5 MG: 5 TABLET ORAL at 21:27

## 2025-02-12 RX ADMIN — FENTANYL CITRATE 25 MCG: 50 INJECTION, SOLUTION INTRAMUSCULAR; INTRAVENOUS at 10:50

## 2025-02-12 RX ADMIN — CINACALCET 60 MG: 30 TABLET ORAL at 20:10

## 2025-02-12 RX ADMIN — FENTANYL CITRATE 25 MCG: 50 INJECTION, SOLUTION INTRAMUSCULAR; INTRAVENOUS at 10:45

## 2025-02-12 RX ADMIN — SEVELAMER CARBONATE 800 MG: 800 TABLET, FILM COATED ORAL at 12:31

## 2025-02-12 RX ADMIN — SEVELAMER CARBONATE 800 MG: 800 TABLET, FILM COATED ORAL at 17:00

## 2025-02-12 RX ADMIN — Medication 5 MG: at 21:27

## 2025-02-12 RX ADMIN — HEPARIN SODIUM 1750 UNITS/HR: 10000 INJECTION, SOLUTION INTRAVENOUS at 14:55

## 2025-02-12 RX ADMIN — CEFAZOLIN SODIUM 2 G: 2 INJECTION, SOLUTION INTRAVENOUS at 10:10

## 2025-02-12 RX ADMIN — ACETAMINOPHEN 975 MG: 325 TABLET, FILM COATED ORAL at 04:50

## 2025-02-12 ASSESSMENT — ACTIVITIES OF DAILY LIVING (ADL)
ADLS_ACUITY_SCORE: 71

## 2025-02-12 NOTE — PLAN OF CARE
Date & Time: 1900-0730.  Summary: Admitted 1/21 for right leg pain and found to have right leg ulcer   1/23 Diagnostic angiogram   1/24 Thoracentesis   1/27: Had right common femoral and popliteal tibial endarterectomy, right femoral to popliteal/ tibial PTFE bypass and admitted to ICU for mechanical ventilation   2/2 Thoracentesis   2/11 - POD 1 from a R transmetatarsal amputation.   Behavior & Aggression: Green   Fall Risk: Yes   Orientation: A&Ox4  ABNL VS/O2:VSS on 1L of o2 overnight. Denied N/V.  ABNL Labs: see chart.  Pain Management: Scheduled Tylenol.  Bowel/Bladder: Pt on peritoneal dialysis, produces little urine, using urinal at bedside. Had a large BM overnight, passing gas per pt.  IV/Drains: PIV infusing heparin @ 1750 units/hr - recheck Hep Xa in am labs.  Skin: RLE incision sites are C/D/I. R foot amputation JUAN CARLOS w/ ace wrap, black heel/malleolus wounds. Blanchable redness to coccyx, mepilex in place. R groin site w/ hematoma and increased pain (MD aware). Peritoneal site. Doppler pulses intact.  Diet: NPO since midnight.  Activity Level: not OOB, up w/ lift, Ax2w/ cares, T&R q2hrs as tolerated.  Tests/Procedures: Plan for possible  CVC placement for dialysis in IR.  Anticipated  DC Date: TBD.  Significant information: RN will need to contact IR in AM to ensure timing to pause Heparin for procedure.

## 2025-02-12 NOTE — PLAN OF CARE
Date & Time: 2/11 7145-9536  Summary:  Admitted 1/21 for right leg pain and found to have right leg ulcer   1/23 Diagnostic angiogram   1/24 Thoracentesis   1/27: Had right common femoral and popliteal tibial endarterectomy, right femoral to popliteal/ tibial PTFE bypass and admitted to ICU for mechanical ventilation   2/2 Thoracentesis   Behavior & Aggression: Green   Fall Risk: Yes   Orientation: A/O  ABNL VS/O2:Tachycardic, all other VSS on 1L of O2  ABNL Labs: see chart.  Pain Management: Scheduled Tylenol.  PRN oxycodone given x1.  Bowel/Bladder: Pt on peritoneal dialysis, produces little urine, using urinal at bedside.   IV/Drains: PIVs SL.  Skin: RLE incision WDL, R groin incision with growing hematoma/edema, MD paged to evaluate.  Dr Maki to evaluate hematoma and RN advised to hold the 1800 restart of heparin until evaluated.   R foot amputation JUAN CARLOS with ace wrap, black heel/malleolus wounds.  Blanchable redness to coccyx, mepilex in place. Peritoneal dialysis site CDI.  Doppler pulses intact.  Diet: NPO at midnight for CVC placement for dialysis in IR  Activity Level: Repositioning, up with a lift.  R leg elevated x2 pillows per order.    Tests/Procedures: Transmetatarsal amputation w/ podiatry today.  Anticipated  DC Date: TBD.    Significant Information:  Pt had increasing pain and edema surrounding groin hematoma, MD to evaluate.  Plan to stop Heparin at 0700 on 2/12 per MARYSOL Cr for IR CVC placement tomorrow, will need to contact IR in AM to ensure timing to discontinue Heparin.  Pt given Lokelma to reduce potassium today.  Pt had an episode of low blood glucose and refused gel or IV replacement as food tray had arrived, pt ate meal and was given juice and other supplemental food to increase blood sugar to 101.

## 2025-02-12 NOTE — PROGRESS NOTES
Care Management Follow Up    Length of Stay (days): 22    Expected Discharge Date: 02/14/2025     Concerns to be Addressed: discharge planning     Patient plan of care discussed at interdisciplinary rounds: Yes    Anticipated Discharge Disposition: Skilled Nursing Facility     Anticipated Discharge Services:  hemodialysis  Anticipated Discharge DME:      Patient/family educated on Medicare website which has current facility and service quality ratings:    Education Provided on the Discharge Plan:    Patient/Family in Agreement with the Plan: yes    Referrals Placed by CM/SW:    Private pay costs discussed: Not applicable    Discussed  Partnership in Safe Discharge Planning  document with patient/family: No     Handoff Completed: Yes, [facility] PCP: Internal handoff referral completed    Additional Information:  Called [facility] and spoke with Brook [phone], manager for unit to follow up on chair acceptance and time for pt.  Discussed that pt is currently up with a lift and Brook stated they have ilda lift at both [facility] and [facility], pt would need to be able to sit in reclining chair.  Discussed current discharge plan is TCU and discharge date and accepting TCU pending.    Per Brook, pt is accepted at [facility] starting 2/17.  Pt unsure of chair time but suggested CC calling Di [phone], manager at [facility].Brook stated that they are opening another chair time and day starting in April that pt will be scheduled for but not until April 1st.      Called [phone] and spoke with Di, manager of  [facility].  Per Di, pt has chair time starting 2/17 at 1:40pm.  Discussed that currently discharge plan is TCU and pending medical readiness and accepting TCU at this time.  CM to update Di once discharge date and accepting TCU known     Next Steps: Follow for medical readiness, accepting TCU, coordination of hemodialysis day/time.  Pt may  benefit from TCU that has hemodialysis on site, discussed with [place] Hoffmann RN, BS  Care Coordinator  [email or web address]

## 2025-02-12 NOTE — IR NOTE
Procedure Note for IR Procedure Dictation  Conscious sedation: 1 mg IVP Versed, 50 mcg IVP fentanyl  Sedation time: 18 minutes  Fluoro time: 0.8 minutes  Total Fluoro Dose: 5.41 mGy (Air Kerma)  Contrast: 0 ml 0  Local anesthetic: 9 ml 1% lidocaine; 6 lido w/epi

## 2025-02-12 NOTE — PROGRESS NOTES
S: Arnulfo was seen at the [facility], Room 2406 for the evaluation, fit and delivery of a right Offloading Shoe.    O: The patient has a right transmetatarsal amputation. The patient was fit with size large Offloading shoe. The forefoot was offloaded by removing foam insole peds under and around the area of concern.    A: After modifying the removable peg foam sole inserts to offload the forefoot area, the shoe was fit to the patient and the dorsum straps were trimmed.    P: The Offloading Shoes are to be worn when the patient is out of bed/weight bearing. The manufacture s written instructions about the care and use of the shoes.    G: The goal of these modified Offloading Shoes is to reduce the pressure on the patient s forefoot while the patient ambulates/bears weight.    Please contact the [facility] Office at [phone] if you have any questions. Thank you, Jarvis    Note electronically signed by Jarvis Sandhu, Board Eligible

## 2025-02-12 NOTE — PROGRESS NOTES
[facility]  PROGRESS NOTE    CHIEF COMPLAINT:      I was asked by Milo Carrion MD  to evaluate this patient for right foot.    PATIENT HISTORY:  Arnulfo Pritchett is a 64 year old male seen in follow up for his right foot. Had a TMA yesterday, states some throbbing to incision site. Otherwise feels fairly well.         Review of Systems:  A 10 point review of systems was performed and is positive for that noted above in the patient history.  All other areas are negative.     PAST MEDICAL HISTORY:   Past Medical History:   Diagnosis Date    CAD (coronary artery disease)     Chronic systolic heart failure (H)     ESRD (end stage renal disease) on dialysis (H)     Gastroesophageal reflux disease without esophagitis     Gout     HLD (hyperlipidemia)     TALI (obstructive sleep apnea)     PAD (peripheral artery disease)     S/p RLE stenting of SFA/popliteal arteries    Type 2 diabetes mellitus with diabetic neuropathy (H)         PAST SURGICAL HISTORY:   Past Surgical History:   Procedure Laterality Date    AMPUTATE FOOT Left 2/11/2025    Procedure: Right foot transmetatarsal amputation;  Surgeon: Alyce Salas DPM;  Location: [facility] OR    AMPUTATE TOE(S) Left 10/27/2024    Procedure: AMPUTATION, TOE left great toe;  Surgeon: Haley Joseph DPM, Podiatry/Foot and Ankle Surgery;  Location: [facility] OR    BYPASS GRAFT FEMOROPOPLITEAL Right 1/27/2025    Procedure: RIGHT FEMORAL ARTERY TO RIGHT POPLITEAL ARTERY ENDARTERECTOMY,  COMMON FEMORAL TO POPLITEAL BELOW KNEE COMPOSITE GREATER SAPHENOUS/PTFE BYPASS GRAFT. GREATER SAPHENOUS VEIN HARVEST;  Surgeon: Patrick Hein MD;  Location: [facility] OR    BYPASS GRAFT FEMOROTIBIAL Left 10/25/2024    Procedure: LEFT FEMORAL ENDARTERECTOMY, LEFT FEMORAL TO TIBIAL PERONEAL TRUNK BYPASS WITH LEFT GREAT SEPHANEOUS VEIN, WOUND VAC PLACEMENT ON LEFT GROIN.;  Surgeon: Raul Gray MD;  Location: [facility]    BYPASS GRAFT FEMOROTIBIAL Left 10/27/2024     Procedure: CREATION, BYPASS, VASCULAR, FEMORAL TO TIBIAL REVISION OF BYPASS LEFT COMMON FEMORAL TO TIBIAL.;  Surgeon: Raul Gray MD;  Location:  OR    CV CORONARY ANGIOGRAM N/A 11/27/2024    Procedure: Coronary Angiogram;  Surgeon: Clem Matt MD;  Location:  [facility]    CV LOWER EXTREMITY ANGIOGRAM LEFT Left 10/27/2024    Procedure: Angiogram Lower Extremity Left;  Surgeon: Raul Gray MD;  Location:  OR    CV PCI N/A 11/27/2024    Procedure: Percutaneous Coronary Intervention;  Surgeon: Clem Matt MD;  Location:  [facility] CATH [facility]    IR LOWER EXTREMITY ANGIOGRAM LEFT  10/17/2024    IR LOWER EXTREMITY ANGIOGRAM RIGHT  1/23/2025    OR ANGIOGRAM, LOWER EXTREMITY Right 1/27/2025    Procedure: INTRAOPERATIVE ANGIOGRAM;  Surgeon: Patrick Hein MD;  Location:  OR        MEDICATIONS:  Reviewed in Epic. Current.     ALLERGIES:  No Known Allergies     SOCIAL HISTORY:   Social History     Socioeconomic History    Marital status:      Spouse name: Not on file    Number of children: Not on file    Years of education: Not on file    Highest education level: Not on file   Occupational History    Not on file   Tobacco Use    Smoking status: Former     Types: Cigarettes    Smokeless tobacco: Never   Vaping Use    Vaping status: Never Used   Substance and Sexual Activity    Alcohol use: Not Currently     Comment: quit 20 years    Drug use: Never    Sexual activity: Not on file   Other Topics Concern    Not on file   Social History Narrative    Not on file     Social Drivers of Health     Financial Resource Strain: Low Risk  (1/21/2025)    Financial Resource Strain     Within the past 12 months, have you or your family members you live with been unable to get utilities (heat, electricity) when it was really needed?: No   Food Insecurity: Low Risk  (1/21/2025)    Food Insecurity     Within the past 12 months, did you worry that your food  would run out before you got money to buy more?: No     Within the past 12 months, did the food you bought just not last and you didn t have money to get more?: No   Transportation Needs: High Risk (1/21/2025)    Transportation Needs     Within the past 12 months, has lack of transportation kept you from medical appointments, getting your medicines, non-medical meetings or appointments, work, or from getting things that you need?: Yes   Physical Activity: Unknown (10/8/2024)    Exercise Vital Sign     Days of Exercise per Week: 0 days     Minutes of Exercise per Session: Not on file   Stress: No Stress Concern Present (10/8/2024)    [employer] [place] [employer] - Occupational Stress Questionnaire     Feeling of Stress : Only a little   Social Connections: Unknown (10/8/2024)    Social Connection and Isolation Panel [NHANES]     Frequency of Communication with Friends and Family: Not on file     Frequency of Social Gatherings with Friends and Family: Patient declined     Attends Religion Services: Not on file     Active Member of Clubs or Organizations: Not on file     Attends Club or Organization Meetings: Not on file     Marital Status: Not on file   Interpersonal Safety: Low Risk  (1/21/2025)    Interpersonal Safety     Do you feel physically and emotionally safe where you currently live?: Yes     Within the past 12 months, have you been hit, slapped, kicked or otherwise physically hurt by someone?: No     Within the past 12 months, have you been humiliated or emotionally abused in other ways by your partner or ex-partner?: No   Housing Stability: Low Risk  (1/21/2025)    Housing Stability     Do you have housing? : Yes     Are you worried about losing your housing?: No        FAMILY HISTORY: History reviewed. No pertinent family history.     EXAM:Vitals: /74 (BP Location: Right arm)   Pulse 89   Temp 97.5  F (36.4  C) (Oral)   Resp 16   Wt 80.6 kg (177 lb 11.1 oz)   SpO2 99%   BMI 23.44  kg/m    BMI= Body mass index is 23.44 kg/m .    LABS:     Last Comprehensive Metabolic Panel:  Sodium   Date Value Ref Range Status   02/12/2025 134 (L) 135 - 145 mmol/L Final     Potassium   Date Value Ref Range Status   02/12/2025 4.9 3.4 - 5.3 mmol/L Final     Chloride   Date Value Ref Range Status   02/12/2025 96 (L) 98 - 107 mmol/L Final     Carbon Dioxide (CO2)   Date Value Ref Range Status   02/12/2025 26 22 - 29 mmol/L Final     Anion Gap   Date Value Ref Range Status   02/12/2025 12 7 - 15 mmol/L Final     Glucose   Date Value Ref Range Status   02/12/2025 116 (H) 70 - 99 mg/dL Final     GLUCOSE BY METER POCT   Date Value Ref Range Status   02/12/2025 109 (H) 70 - 99 mg/dL Final     Urea Nitrogen   Date Value Ref Range Status   02/12/2025 69.8 (H) 8.0 - 23.0 mg/dL Final     Creatinine   Date Value Ref Range Status   02/12/2025 5.94 (H) 0.67 - 1.17 mg/dL Final     GFR Estimate   Date Value Ref Range Status   02/12/2025 10 (L) >60 mL/min/1.73m2 Final     Comment:     eGFR calculated using 2021 CKD-EPI equation.     Calcium   Date Value Ref Range Status   02/12/2025 9.6 8.8 - 10.4 mg/dL Final     Lab Results   Component Value Date    WBC 13.0 01/22/2025     Lab Results   Component Value Date    RBC 3.49 01/22/2025     Lab Results   Component Value Date    HGB 10.8 01/22/2025     Lab Results   Component Value Date    HCT 33.8 01/22/2025     Lab Results   Component Value Date     01/22/2025      Lab Results   Component Value Date    INR 2.70 01/22/2025    INR 2.44 01/21/2025    INR 4.7 01/14/2025    INR 2.4 01/09/2025    INR 1.6 01/06/2025    INR 1.9 01/02/2025    INR 1.3 12/30/2024    INR 1.0 12/27/2024    INR 1.1 12/26/2024    INR 1.0 12/24/2024    INR 2.28 12/07/2024    INR 2.59 12/06/2024    INR 2.30 12/05/2024    INR 2.29 12/05/2024    INR 2.39 12/03/2024    INR 1.92 12/02/2024    INR 1.87 12/01/2024    INR 1.64 11/30/2024    INR 1.75 11/29/2024    INR 1.76 11/28/2024        General appearance:  Patient is alert and fully cooperative with history & exam.  No sign of distress is noted during the visit.      Respiratory: Breathing is regular & unlabored while sitting.      HEENT: Hearing is intact to spoken word.  Speech is clear.  No gross evidence of visual impairment that would impact ambulation.      Dermatologic: Right foot: incision site intact and viable. No cellulitis or ischemia. Minimal sanguinous drainage. Nontender.      Vascular: Dorsalis pedis and posterior tibial pulses are difficult to palpate. Audible with dopplar. DP audible into midfoot, but weakens at digits level.      Neurologic: Lower extremity sensation is diminished, bilateral foot, to light touch.  No evidence of neurological-based weakness or contracture in the lower extremities.       Musculoskeletal: Patient is ambulatory without an assistive device or brace.  No gross foot or ankle deformity noted.       Psychiatric: Affect is pleasant & appropriate.      All cultures:  Recent Labs   Lab 02/11/25  1040 02/05/25  1650 02/05/25  1638   CULTURE No growth, less than 1 day  See corresponding culture for results No Growth No Growth        IMAGING:     Arterial US 1/22:    IMPRESSION:  1.  No blood flow demonstrated in the distal superficial femoral, popliteal, and runoff arteries. There may be trickle blood flow in the distal runoff arteries, though difficult to distinguish from artifact.     2.  Uncertain if stent is in the right lower extremity. Previous angiogram images are not available for review.    Right ankle MRI:   IMPRESSION:  1.  There is a soft tissue ulceration over the posterior aspect of the hindfoot with some surrounding edema or cellulitis but no evidence for abscess.  2.  No evidence for osteomyelitis.  3.  Small tibiotalar joint effusion.  4.  No evidence for fracture.  5.  Acute on chronic plantar fasciitis. Plantar calcaneal spurring.  6.  No additional tendinous or ligamentous pathology.    I personally reviewed  the images and agree with the reports as stated.    Right foot XR 2/11:  IMPRESSION: Status post transmetatarsal amputation of the right forefoot. Surrounding soft tissue swelling. No definite evidence of acute osteomyelitis or fracture. Normal joint alignment. Vascular calcification     Culture:  Foot, Right; Tissue   0 Result Notes  Culture No growth, less than 1 day          ASSESSMENT:  Right forefoot gangrene now s/p TMA on 2/11  Right heel gangrene --> no osteomyelitis   Peripheral Arterial Disease s/p right fem - BK pop bypass  Diabetes Mellitus --> hba1c: 5.7     MEDICAL DECISION MAKING:   -Discussed all findings with patient. Chart and imaging reviewed.     -Right foot dressing changed following TMA, incision site viable without yessica ischemia or signs of infection. Cultures no growth so far. Intra-op: no significant signs of infection.   -Will order surgical shoe for right foot, although patient appears to be minimally ambulatory. Should be NWB to RLE with heel use for pivots/transfers.     -No further surgery per podiatry. Will place dressing change orders for right foot for bedside nursing.     -Okay to continue heparin as needed.     -Will follow peripherally. [employer] text with questions/concerns.       Alyce Salas DPM   [place] [facility] of Podiatry/Foot & Ankle Surgery  Available via [employer]

## 2025-02-12 NOTE — PROGRESS NOTES
[facility]    Nephrology Progress Note     Assessment & Plan     Arnulfo Pritchett is a 64 year old male CAD, HTN, HLD, pafib, HFrEF (EF 20-25%), ESRD on PD, DM2, TALI, RCC s/p R nephrectomy (2022), PAD with multiple LE procedures      1) End stage renal disease on PD  Chronic PD for last 3.5 years.  Home unit [facility], nephrologist Dr. May  Rx: 5 cycles, 2 L fill volumes, 9 hours, last fill 1.2 L with Extraneal.       2) Moderate R pleural effusion: Status post thoracentesis on 1/24 and 2/2.  Transudative  Chest x-ray on 2/5 shows small layering effusion on right side     3) PAD, right heel gangrene, occluded right SFA.  Status post rt fem - below knee popliteal bypass this admission     4) Postsurgical anemia in patient with ESRD-  -status post blood transfusion.  Hemoglobin 7-8.   -EPO 20,000 units 2/9/25 and continue weekly                              Other issues-  Diabetes mellitus  Chronic paroxysmal atrial fibrillation  Secondary hyperparathyroidism and hyperphosphatemia  Severe heart failure with EF of 30% and mild RV dysfunction.      Discussion/plan:  Patient reports he is tired of doing PD and does not think he is thriving on it.  He wants to know if hemodialysis is going to be an option.  If not, he reports he has been thinking about pursuing palliative care, but would like to try hemodialysis first.     He was on hemodialysis before.  He switched to PD to better accommodate his work schedule.  He is no longer working.  He is very frail.  I discussed with him that hemodialysis will still be stressful to his body and he may not do too much better than he is doing on PD.  I spoke to Dr. May who confirms that he is not doing well on PD and she supports the move to HD.      Plan:     No PD tonight     HD run tomorrow.      Placement.    Harish Minor MD  [facility] - Nephrology  [phone]    Interval History     He feels OK.  His tunneled cath was  placed.    Physical Exam   Temp: 97.5  F (36.4  C) Temp src: Oral BP: 107/74 Pulse: 89   Resp: 16 SpO2: 99 % O2 Device: Nasal cannula Oxygen Delivery: 1 LPM  Vitals:    02/09/25 0714 02/11/25 0423 02/12/25 0615   Weight: 74.9 kg (165 lb 2 oz) 85 kg (187 lb 6.3 oz) 80.6 kg (177 lb 11.1 oz)     Vital Signs with Ranges  Temp:  [97.5  F (36.4  C)-98.1  F (36.7  C)] 97.5  F (36.4  C)  Pulse:  [] 89  Resp:  [10-22] 16  BP: (104-139)/() 107/74  SpO2:  [78 %-100 %] 99 %  I/O last 3 completed shifts:  In: 520 [P.O.:120; I.V.:400]  Out: 30 [Blood:30]    GENERAL APPEARANCE: pleasant, NAD, a & o  HEENT:  Eyes/ears/nose/neck grossly normal  RESP: lungs cta b c good efforts, no crackles, rhonchi or wheezes  CV: RRR, nl S1/S2, no m/r/g   ABDOMEN: o/s/nt/nd, bs present  EXTREMITIES/SKIN: no rashes/lesions; thigh edema    Medications   Current Facility-Administered Medications   Medication Dose Route Frequency Provider Last Rate Last Admin    dextrose 10% infusion   Intravenous Continuous PRN Walter Dempsey MD        dianeal PD LOW calcium-1.5% dex (calcium 2.5 mEq/L) 6,000 mL PERITONEAL DIALYSATE   Dialysis [facility] Supplied PD Harish Minor MD        heparin 25,000 units in 0.45% NaCl 250 mL ANTICOAGULANT infusion  0-5,000 Units/hr Intravenous Continuous Alyce Salas DPM 17.5 mL/hr at 02/12/25 0050 1,750 Units/hr at 02/12/25 0050    Patient is already receiving anticoagulation with heparin, enoxaparin (LOVENOX), warfarin (COUMADIN)  or other anticoagulant medication   Does not apply Continuous PRN Walter Dempsey MD         Current Facility-Administered Medications   Medication Dose Route Frequency Provider Last Rate Last Admin    - MEDICATION INSTRUCTIONS for Dialysis Patients -   Does not apply See Admin Instructions Walter Dempsey MD        acetaminophen (TYLENOL) tablet 975 mg  975 mg Oral Q8H Alyce Salas DPM   975 mg at 02/12/25 1231    [Held by provider] allopurinol (ZYLOPRIM) tablet  200 mg  200 mg Oral QPM Walter Dempsey MD   200 mg at 01/26/25 1957    atorvastatin (LIPITOR) tablet 40 mg  40 mg Oral or Feeding Tube At Bedtime Walter Dempsey MD   40 mg at 02/11/25 2032    calcitRIOL (ROCALTROL) capsule 0.25 mcg  0.25 mcg Oral At Bedtime Walter Dempsey MD   0.25 mcg at 02/11/25 2032    cinacalcet (SENSIPAR) tablet 60 mg  60 mg Oral Q Mon Wed Fri AM Walter Dempsey MD   60 mg at 02/10/25 2035    clopidogrel (PLAVIX) tablet 75 mg  75 mg Oral or Feeding Tube QPM Walter Dempsey MD   75 mg at 02/11/25 2032    epoetin evaristo-epbx (RETACRIT) injection 20,000 Units  20,000 Units Intravenous Weekly Jennifer Jameson MD   20,000 Units at 02/09/25 1739    sodium chloride 0.9% DIALYSIS Cath LOCK - BLUE Lumen  1.3-2.6 mL Intracatheter Once Ulices Muir PA-C        Followed by    heparin 1000 unit/mL DIALYSIS Cath LOCK - BLUE Lumen  3 mL Intracatheter Once Ulices Muir PA-C        sodium chloride 0.9% DIALYSIS Cath LOCK - RED Lumen  1.3-2.6 mL Intracatheter Once Ulices Muir PA-C        Followed by    heparin 1000 unit/mL DIALYSIS Cath LOCK - RED Lumen  3 mL Intracatheter Once Ulices Muir PA-C        insulin aspart (NovoLOG) injection (RAPID ACTING)   Subcutaneous TID w/meals Walter Dempsey MD   3 Units at 02/12/25 1325    insulin aspart (NovoLOG) injection (RAPID ACTING)  1-7 Units Subcutaneous TID AC Walter Dempsey MD   1 Units at 02/09/25 1101    insulin aspart (NovoLOG) injection (RAPID ACTING)  1-5 Units Subcutaneous At Bedtime Walter Dempsey MD   1 Units at 02/07/25 0040    insulin glargine (LANTUS PEN) injection 20 Units  20 Units Subcutaneous Daily Enu-Heidi Méndez MD   20 Units at 02/11/25 1247    melatonin tablet 5 mg  5 mg Oral At Bedtime Walter Dempsey MD   5 mg at 02/11/25 2032    metoprolol succinate ER (TOPROL-XL) 24 hr half-tab 12.5 mg  12.5 mg Oral Daily Larry Askew MD   12.5 mg at 02/12/25 0846     midodrine (PROAMATINE) tablet 10 mg  10 mg Oral BID w/meals Walter Dempsey MD   10 mg at 02/12/25 0846    multivitamin RENAL (TRIPHROCAPS) capsule 1 capsule  1 capsule Oral Daily Walter Dempsey MD   1 capsule at 02/12/25 0846    pantoprazole (PROTONIX) EC tablet 40 mg  40 mg Oral QAM AC Carolina Alejandre, RPH   40 mg at 02/12/25 0846    polyethylene glycol (MIRALAX) Packet 17 g  17 g Oral or NG Tube Daily Walter Dempsey MD   17 g at 02/06/25 0839    senna-docusate (SENOKOT-S/PERICOLACE) 8.6-50 MG per tablet 1 tablet  1 tablet Oral BID Alyce Salas, ANGELITA   1 tablet at 02/11/25 2032    sevelamer carbonate (RENVELA) tablet 800 mg  800 mg Oral TID w/meals Walter Dempsey MD   800 mg at 02/12/25 1231    sodium chloride (PF) 0.9% PF flush 3 mL  3 mL Intracatheter Q8H Walter Dempsey MD   3 mL at 02/11/25 2031       Data   BMP  Recent Labs   Lab 02/12/25  1130 02/12/25  0726 02/12/25  0206 02/11/25  2217 02/11/25  0804 02/11/25  0625 02/10/25  0908 02/10/25  0635 02/09/25  0756 02/09/25  0656   NA  --  134*  --   --   --  135  --  134*  --  132*   POTASSIUM  --  4.9  --   --   --  5.8*  --  5.1  --  5.1   CHLORIDE  --  96*  --   --   --  95*  --  94*  --  92*   TERENCE  --  9.6  --   --   --  10.1  --  10.0  --  9.8   CO2  --  26  --   --   --  26  --  26  --  25   BUN  --  69.8*  --   --   --  71.8*  --  70.4*  --  71.2*   CR  --  5.94*  --   --   --  5.85*  --  5.94*  --  5.71*   * 116* 154* 139*   < > 178*   < > 137*   < > 227*    < > = values in this interval not displayed.     [email or web address](phos:4)  CBC)  Recent Labs   Lab 02/12/25  0726 02/11/25  0912 02/10/25  0635 02/09/25  2238 02/09/25  1446 02/09/25  0656 02/08/25  1914 02/08/25  0543   WBC 15.0* 15.8*  --   --   --  15.2*  --  15.2*   HGB 7.5* 8.3* 7.6* 7.9*   < > 8.2*  8.2*   < > 6.7*   HCT 23.6* 25.1*  --   --   --  25.3*  --  20.2*   MCV 95 93  --   --   --  92  --  94   * 545*  --   --   --  560*  --  488*    < > =  values in this interval not displayed.         Attestation:   I have reviewed today's relevant vital signs, notes, medications, labs and imaging.

## 2025-02-12 NOTE — CONSULTS
"  Interventional Radiology - Pre-Procedure Note:  2/12/2025    Procedure Requested: Tunneled HD catheter placement  Requested by: Dr Minor    History and Physical Reviewed: H&P documented within 30 days (by Dr Armas on 1/21/25).  I have personally reviewed the patient's medical history and have updated the medical record as necessary.    Brief HPI: Arnulfo Pritchett is a 64 year old male w h/o ESRD on PD, IDDM, TALI, gout, CAD w stenting, severe HF, PAD s/p multiple LE interventions, RCC s/p nephrectomy who presented to [facility] with leg pain. Since admission patient has undergone RLE bypass w vascular surgery and yesterday a right TMA. Patient has discussed with nephrology that he does not feel he has been doing well on PD and wishes to transition to HD. Nephrology in agreement and has requested tunneled HD catheter placement for which our service was consulted. WBC persistently elevated, blood cultures on 2/5 were negative. Currently denies N/V/F/C. Patient does report a history of HD catheter placement in the past.      IMAGING:  CT Chest wo 2/1/25:  \"IMPRESSION:   1.  A large right pleural effusion has further increased in size and occupies greater than 50% of the right hemithorax. There is near complete collapse of the right lower lobe and partial collapse of the right middle lobe.  2.  A small left pleural effusion is unchanged.  3.  Mild interstitial opacities in both lungs are nonspecific but may be due to pulmonary edema.  4.  Cardiomegaly.  5.  Multiple subacute posterolateral right rib fractures.\"    NPO: YES since [place]  ANTICOAGULANTS: Heparin IV per therapeutic protocol, Plavix 75mg PO daily  ANTIBIOTICS: Ancef 2g IV for periprocedural prophylaxis    ALLERGIES  No Known Allergies      LABS:  INR   Date Value Ref Range Status   02/12/2025 1.35 (H) 0.85 - 1.15 Final     INR (External)   Date Value Ref Range Status   01/14/2025 4.7 (A) 0.9 - 1.1 Final      Hemoglobin   Date Value Ref Range Status   02/12/2025 7.5 (L) " 13.3 - 17.7 g/dL Final   ]  Platelet Count   Date Value Ref Range Status   02/12/2025 555 (H) 150 - 450 10e3/uL Final     Creatinine   Date Value Ref Range Status   02/11/2025 5.85 (H) 0.67 - 1.17 mg/dL Final     Potassium   Date Value Ref Range Status   02/11/2025 5.8 (H) 3.4 - 5.3 mmol/L Final         EXAM:  /85   Pulse 101   Temp 98.1  F (36.7  C)   Resp 22   Wt 80.6 kg (177 lb 11.1 oz)   SpO2 98%   BMI 23.44 kg/m    General:  Stable.  In no acute distress.    Neuro:  A&O x 3. Moves all extremities equally.  Heart: RRR  Lungs: No increased work of breathing on supplemental O2 via NC      Pre-Sedation Code Status Assessment:  Code Status: Full Code intra procedure, per discussion with patient  H/O TALI      ASSESSMENT/PLAN:   ESRD transitioning from PD to HD  PAD s/p TMA 2/11/25    -Tunneled HD catheter placement today w image guidance moderate sedation    Procedure, risks/benefits, details, alternatives, and sedation reviewed with patient and patient verbalized understanding. All questions answered. OK to proceed with above radiology procedure.     Ulices Muir PA-C  Interventional Radiology  *[id number] [phone]      Total Time: 35 minutes

## 2025-02-12 NOTE — IR NOTE
Interventional Radiology Intra-procedural Nursing Note    Patient Name: Arnulfo Pritchett  Medical Record Number: [id number]  Today's Date: February 12, 2025    Start Time: 1050  End of procedure time: 1108  Procedure: Consented for Tunneled dialysis catheter placement with image guidance   Report given to: General Surgery RN  Time pt departs:  1118    Other Notes: Pt into IR suite 1 via cart. Pt awake and alert. To table in supine position. Monitoring equipment applied. VSS. Tele SR. Dr. Gil in room. Time out and procedure started. Pt tolerated procedure well. Debrief with Dr. Gil. Right dual 14.5 Fr CVC tunneled line placed and pressure held until hemostasis achieved. Dressing CDI. Keep pts HOB at 45 degrees for 6 hours until 1710, No complications. Pt transferred back to General Surgery Floor.    Medications:    Versed 1 mg  Fentanyl 50 mcg  Lidocaine 1% 9 ml  Lidocaine 1% with EPI 6 ml  Heparin 3,200 units    Taoy Foley RN

## 2025-02-12 NOTE — PROGRESS NOTES
"[facility] Nurse Inpatient Assessment     Consulted for: Wound R heel ulcer     Summary: patient with unstageable pressure injuries to right heel and right lateral malleolus, present on admission, stable on follow up 2/5     [facility] nurse follow-up plan: weekly    Patient History (according to provider note(s):      \"64 year old male with past medical history of severe heart failure (EF 30%), ESRD (on peritoneal dialysis), chronic paroxysmal AF on warfarin, LLE PAD s/p endarterectomy, RCC s/p nephrectomy admitted on 1/21/2025 for septic shock secondary to right leg PAD with worsening right heel necrotic ulcer. The patient was seen in ED at outside hospital on 1/3/25 and diagnosed with pneumonia, later completing course of Cefdinir. He then presented on 1/21/25 to outside hospital for right leg pain and found to have right leg ulcer and transferred to [facility] for vascular surgery evaluation. Now s/p right common femoral BK popliteal/tibial endarterectomy and right common femoral to below-knee popliteal/tibial PTFE bypass performed on 1/27/25. The patient was admitted to the ICU for requirement of mechanical ventilation and pressor support following surgery for multifactorial shock.       Hospitalist service consulted 1/31/2025 for transfer out of ICU and to assist with medical management along with vascular surgery. Infectious disease also consulted.\"    Assessment:      Areas visualized during today's visit: Focused:, Lower extremities , and Right    Wound location: right heel     Last photo: 2/5  Wound due to: Pressure Injury unstageable community acquired   Wound history/plan of care: per POC   Wound base:  Eschar,      Palpation of the wound bed: firm      Drainage: none     Description of drainage: none     Measurements (length x width x depth, in cm): 8  x 8  x  0 cm      Tunneling: N/A     Undermining: N/A  Periwound skin: Intact      Color: normal and consistent with surrounding " "tissue      Temperature: normal   Odor: none  Pain: no grimacing or signs of discomfort, none  Pain interventions prior to dressing change: patient tolerated well  Treatment goal: Maintain (prevention of deterioration) and Protection  STATUS: stable  Supplies ordered: supplies stored on unit    Wound location: right lateral malleolus     Last photo: 2/5  Wound due to: Pressure Injury unstageable community acquired   Wound history/plan of care: per POC   Wound base:  Eschar,      Palpation of the wound bed: firm      Drainage: none     Description of drainage: none     Measurements (length x width x depth, in cm): 2.5  x 1.5  x  0 cm      Tunneling: N/A     Undermining: N/A  Periwound skin: Intact      Color: normal and consistent with surrounding tissue      Temperature: normal   Odor: none  Pain: no grimacing or signs of discomfort, none  Pain interventions prior to dressing change: patient tolerated well  Treatment goal: Maintain (prevention of deterioration) and Protection  STATUS: stable  Supplies ordered: supplies stored on unit        Treatment Plan:     01/29/25 0600  Wound care  DAILY        Comments: Right heel and lateral malleolus wound(s): Daily  \"Paint\" wounds with betadine and let dry. Cover with dry gauze/ABD and wrap with Kerlix. Secure with Medipore tape. Keep heel off-loaded at all times with Rooke boot.          Orders: Reviewed    RECOMMEND PRIMARY TEAM ORDER: None, at this time  Education provided: plan of care  Discussed plan of care with: Patient and Nurse  Notify WOC if wound(s) deteriorate.  Nursing to notify the Provider(s) and re-consult the WOC Nurse if new skin concern.    DATA:     Current support surface: Standard  Standard gel mattress (Isoflex)  Containment of urine/stool: Incontinence Protocol and Incontinent pad in bed  BMI: Body mass index is 23.44 kg/m .   Active diet order: Orders Placed This Encounter      NPO per Anesthesia Guidelines for Procedure/Surgery Except for: Meds, " Ice Chips     Output: I/O last 3 completed shifts:  In: 520 [P.O.:120; I.V.:400]  Out: 30 [Blood:30]     Labs:   Recent Labs   Lab 02/12/25  0726   ALBUMIN 1.6*   HGB 7.5*   INR 1.35*   WBC 15.0*     Pressure injury risk assessment:   Sensory Perception: 3-->slightly limited  Moisture: 3-->occasionally moist  Activity: 2-->chairfast  Mobility: 2-->very limited  Nutrition: 3-->adequate  Friction and Shear: 2-->potential problem  Matias Score: 15    Evelia Hoffmann RN, CWOCN   Securely message with [employer] ([facility] [employer])

## 2025-02-12 NOTE — PROGRESS NOTES
CC:  Giovanni Rodríguez is here today for a complete eye exam. The patient states that the vision in both eyes has been stable since their last visit. The patient denies any floaters, photopsia, or ocular discomfort in either eye.      Referring MD: none    Medications, allergies, tobacco history, past medical, past surgical and pertinent family histories reviewed and updated where needed in the EMR.    Ocular Medications: none    Denies known Latex allergy or symptoms of Latex sensitivity.    [facility]    Hospitalist Progress Note    Interval History   Patient is awake and alert.  Underwent IR tunneled catheter placement today.  Tolerated procedure well.  Plan on hemodialysis tomorrow.    -Data reviewed today: I reviewed all new labs and imaging results over the last 24 hours. I personally reviewed the chest x-ray image(s) showing small right pleural effusion  .    Physical Exam   Temp: 97.5  F (36.4  C) Temp src: Oral BP: 107/74 Pulse: 89   Resp: 16 SpO2: 99 % O2 Device: Nasal cannula Oxygen Delivery: 1 LPM  Vitals:    02/09/25 0714 02/11/25 0423 02/12/25 0615   Weight: 74.9 kg (165 lb 2 oz) 85 kg (187 lb 6.3 oz) 80.6 kg (177 lb 11.1 oz)     Vital Signs with Ranges  Temp:  [97.5  F (36.4  C)-98.1  F (36.7  C)] 97.5  F (36.4  C)  Pulse:  [] 89  Resp:  [10-22] 16  BP: (104-139)/() 107/74  SpO2:  [78 %-100 %] 99 %  I/O last 3 completed shifts:  In: 120 [P.O.:120]  Out: -     Physical exam  General-awake and alert.  Appears very frail and thin.  Musculoskeletal-s/p right transmetatarsal amputation.  Skin-right groin surgical site appears erythematous and indurated and tender.  Abdomen-peritoneal dialysis catheter in place.  Soft and nontender.  Chest-S1-S2 heard normal.  Bilateral basal crackles present.  Right-sided tunneled IJ catheter in place.      Medications   Current Facility-Administered Medications   Medication Dose Route Frequency Provider Last Rate Last Admin    dextrose 10% infusion   Intravenous Continuous PRN Walter Dempsey MD        dianeal PD LOW calcium-1.5% dex (calcium 2.5 mEq/L) 6,000 mL PERITONEAL DIALYSATE   Dialysis [facility] Supplied PD Harish Minor MD        heparin 25,000 units in 0.45% NaCl 250 mL ANTICOAGULANT infusion  0-5,000 Units/hr Intravenous Continuous CadmarilinxAlyce DPM 17.5 mL/hr at 02/12/25 1455 1,750 Units/hr at 02/12/25 1455    Patient is already receiving anticoagulation with heparin, enoxaparin (LOVENOX), warfarin  (COUMADIN)  or other anticoagulant medication   Does not apply Continuous PRN Walter Dempsey MD         Current Facility-Administered Medications   Medication Dose Route Frequency Provider Last Rate Last Admin    - MEDICATION INSTRUCTIONS for Dialysis Patients -   Does not apply See Admin Instructions Walter Dempsey MD        acetaminophen (TYLENOL) tablet 975 mg  975 mg Oral Q8H CadAlyce hernandez DPMEGHAN   975 mg at 02/12/25 1231    [Held by provider] allopurinol (ZYLOPRIM) tablet 200 mg  200 mg Oral QPM Walter Dempsey MD   200 mg at 01/26/25 1957    atorvastatin (LIPITOR) tablet 40 mg  40 mg Oral or Feeding Tube At Bedtime Walter Dempsey MD   40 mg at 02/11/25 2032    calcitRIOL (ROCALTROL) capsule 0.25 mcg  0.25 mcg Oral At Bedtime Walter Dempsey MD   0.25 mcg at 02/11/25 2032    cinacalcet (SENSIPAR) tablet 60 mg  60 mg Oral Q Mon Wed Fri AM Walter Dempsey MD   60 mg at 02/10/25 2035    clopidogrel (PLAVIX) tablet 75 mg  75 mg Oral or Feeding Tube QPM Walter Dempsey MD   75 mg at 02/11/25 2032    epoetin evaristo-epbx (RETACRIT) injection 20,000 Units  20,000 Units Intravenous Weekly Jennifer Jameson MD   20,000 Units at 02/09/25 1739    sodium chloride 0.9% DIALYSIS Cath LOCK - BLUE Lumen  1.3-2.6 mL Intracatheter Once Ulices Muir PA-C        Followed by    heparin 1000 unit/mL DIALYSIS Cath LOCK - BLUE Lumen  3 mL Intracatheter Once Ulices Muir PA-C        sodium chloride 0.9% DIALYSIS Cath LOCK - RED Lumen  1.3-2.6 mL Intracatheter Once Ulices Muir PA-C        Followed by    heparin 1000 unit/mL DIALYSIS Cath LOCK - RED Lumen  3 mL Intracatheter Once Ulices Muir PA-C        insulin aspart (NovoLOG) injection (RAPID ACTING)   Subcutaneous TID w/meals Walter Dempsey MD   3 Units at 02/12/25 1325    insulin aspart (NovoLOG) injection (RAPID ACTING)  1-7 Units Subcutaneous TID AC Walter Dempsey MD   1 Units at 02/09/25 1107     insulin aspart (NovoLOG) injection (RAPID ACTING)  1-5 Units Subcutaneous At Bedtime Walter Dempsey MD   1 Units at 02/07/25 0040    insulin glargine (LANTUS PEN) injection 20 Units  20 Units Subcutaneous Daily Enu-Heidi Méndez MD   20 Units at 02/11/25 1247    melatonin tablet 5 mg  5 mg Oral At Bedtime Walter Dempsey MD   5 mg at 02/11/25 2032    metoprolol succinate ER (TOPROL-XL) 24 hr half-tab 12.5 mg  12.5 mg Oral Daily Larry Askew MD   12.5 mg at 02/12/25 0846    midodrine (PROAMATINE) tablet 10 mg  10 mg Oral BID w/meals Walter Dempsey MD   10 mg at 02/12/25 0846    multivitamin RENAL (TRIPHROCAPS) capsule 1 capsule  1 capsule Oral Daily Walter Dempsey MD   1 capsule at 02/12/25 0846    pantoprazole (PROTONIX) EC tablet 40 mg  40 mg Oral QAM  Carolina Alejandre, [facility]   40 mg at 02/12/25 0846    polyethylene glycol (MIRALAX) Packet 17 g  17 g Oral or NG Tube Daily Walter Dempsey MD   17 g at 02/06/25 0839    senna-docusate (SENOKOT-S/PERICOLACE) 8.6-50 MG per tablet 1 tablet  1 tablet Oral BID Alyce Salas, ANGELITA   1 tablet at 02/11/25 2032    sevelamer carbonate (RENVELA) tablet 800 mg  800 mg Oral TID w/meals Walter Dempsey MD   800 mg at 02/12/25 1231    sodium chloride (PF) 0.9% PF flush 3 mL  3 mL Intracatheter Q8H Walter Dempsey MD   3 mL at 02/11/25 2031       Data   Recent Labs   Lab 02/12/25  1130 02/12/25  0726 02/12/25  0206 02/11/25  1137 02/11/25  0912 02/11/25  0804 02/11/25  0625 02/10/25  0908 02/10/25  0635 02/09/25  0756 02/09/25  0656   WBC  --  15.0*  --   --  15.8*  --   --   --   --   --  15.2*   HGB  --  7.5*  --   --  8.3*  --   --   --  7.6*   < > 8.2*  8.2*   MCV  --  95  --   --  93  --   --   --   --   --  92   PLT  --  555*  --   --  545*  --   --   --   --   --  560*   INR  --  1.35*  --   --   --   --   --   --   --   --   --    NA  --  134*  --   --   --   --  135  --  134*  --  132*   POTASSIUM  --  4.9  --   --   --   --  5.8*   --  5.1  --  5.1   CHLORIDE  --  96*  --   --   --   --  95*  --  94*  --  92*   CO2  --  26  --   --   --   --  26  --  26  --  25   BUN  --  69.8*  --   --   --   --  71.8*  --  70.4*  --  71.2*   CR  --  5.94*  --   --   --   --  5.85*  --  5.94*  --  5.71*   ANIONGAP  --  12  --   --   --   --  14  --  14  --  15   TERENCE  --  9.6  --   --   --   --  10.1  --  10.0  --  9.8   * 116* 154*   < >  --    < > 178*   < > 137*   < > 227*   ALBUMIN  --  1.6*  --   --   --   --   --   --   --   --   --     < > = values in this interval not displayed.       Recent Results (from the past 24 hours)   XR Foot Port Right 2 Views    Narrative    EXAM: XR FOOT PORT RIGHT 2 VIEWS  LOCATION: [facility]  DATE: 2/11/2025    INDICATION: s/p TMA  COMPARISON: 10/27/2024      Impression    IMPRESSION: Status post transmetatarsal amputation of the right forefoot. Surrounding soft tissue swelling. No definite evidence of acute osteomyelitis or fracture. Normal joint alignment. Vascular calcification.   IR CVC Tunnel Placement > 4 Yrs of Age    Narrative    [facility]    EXAM: TUNNELED CENTRAL VENOUS CATHETER PLACEMENT    LOCATION: [facility]    CLINICAL HISTORY: The patient has a history of renal failure and requires dialysis.    PROCEDURES PERFORMED:  1. Ultrasound-guided puncture of jugular vein  2. Creation of subcutaneous tunnel in chest wall.  3. Placement of dialysis catheter through subcutaneous tunnel, into peel away sheath, and into SVC.     MODERATE SEDATION: 1 mg  Versed and 50 mcg Fentanyl were administered intravenously for moderate sedation. Pulse oximetry, heart rate and blood pressure were continuously monitored by an independent trained observer. The physician spent 18 minutes of   face-to-face moderate sedation time with the patient.    ADDITIONAL MEDICATIONS: see EMR    CONTRAST: none    FLUOROSCOPIC TIME: 0.8 minutes  CUMULATIVE AIR KERMA/DOSE: 5.41  mGy    STERILE BARRIER TECHNIQUE: Maximal Sterile Barrier Technique Utilized: Cap AND mask AND sterile gown AND sterile gloves AND sterile full body drape AND hand hygiene AND skin preparation 2% chlorhexidine for cutaneous antisepsis (or acceptable alternative   antiseptics).   Sterile Ultrasound Technique Utilized ?Sterile gel AND sterile probe covers.    UNIVERSAL PROTOCOL: Standard universal protocol per facility guidelines was followed. See EMR for documentation.     TECHNIQUE:   Risks, benefits and alternatives were explained to the patient and written, informed consent was obtained. The patient was placed in the supine position on the angiography table. The neck and chest were prepped and draped in the usual fashion and   anesthetized with 1% lidocaine. Using ultrasound guidance, the internal jugular vein was accessed with a micropuncture system..  A 0.35 wire was advanced through the sheath and into the IVC. A subcutaneous tunnel was then created in the chest wall using   blunt dissection. The catheter was then attached to a tunneling device and brought through the tunnel. The venostomy site was sequentially dilated. The catheter was then placed through a peel away sheath, and into the right atrium. The puncture site was   closed using surgical glue  The catheter was secured to the skin using a Prolene stitch. Each port was dwelled with heparin (1000 units per cc) solution, and the site was dressed in a sterile fashion.  The patient tolerated the procedure well without   immediate complications.    FINDINGS:   The right internal jugular vein is anechoic, compressible and patent by ultrasound, and ultrasound images obtained during access show the needle within the vein. Ultrasound images have been permanently captured for documentation.  Fluoroscopic images obtained following placement of the catheter, show that the catheter terminates near the cavoatrial junction..  The catheter has a smooth course in the  chest wall. The catheter functions well and is available for immediate use.      Impression    IMPRESSION:  1. Ultrasound and fluoroscopic guided placement of tunneled 19 cm tip to cuff right internal jugular dialysis catheter.          CPT Codes:                                    Assessment & Plan      Arnulfo Pritchett is a 64 year old male with past medical history of severe heart failure (EF 30%), ESRD (on peritoneal dialysis), chronic paroxysmal AF on warfarin, LLE PAD s/p endarterectomy, RCC s/p nephrectomy admitted on 1/21/2025 for septic shock secondary to right leg PAD with worsening right heel necrotic ulcer. The patient was seen in ED at outside hospital on 1/3/25 and diagnosed with pneumonia, later completing course of Cefdinir. He then presented on 1/21/25 to outside hospital for right leg pain and found to have right leg ulcer and transferred to [facility] for vascular surgery evaluation. Now s/p right common femoral BK popliteal/tibial endarterectomy and right common femoral to below-knee popliteal/tibial PTFE bypass performed on 1/27/25. The patient was admitted to the ICU for requirement of mechanical ventilation and pressor support following surgery for multifactorial shock.       Hospitalist service consulted 1/31/2025 for transfer out of ICU and to assist with medical management along with vascular surgery, podiatry and Infectious disease.     Hx PAD of LLE s/p endarterectomy and fem-tibioperoneal trunk bypass on 10/25/24  Hx RLE arterial occlusion s/p SFA angioplasty and stent 5/2024  PAD RLE, right heel gangrene, occluded SFA, no evidence of osteomyelitis  s/p right common femoral and popliteal/tibial endarterectomy, right femoral to popliteal/tibial PTFE bypass 1/27/25   -Found to have critical limb ischemia on admission on January 21, for details see admission note.   -Distributive shock following surgery followed by the ICU service until 1/31.  -Followed by vascular surgery, podiatry, wound  care, and ID.  -Coumadin has been held since admission, now on IV heparin drip.  Transition to warfarin per vascular surgery.  -Has been maintained on statin and Plavix 75 mg daily during this hospital stay.  -Recent IV piperacillin/tazobactam (Zosyn), discontinued 2/2 per ID; follow-up cultures, monitor off antibiotics.  -Wound care per surgery and WOC.  -pain control, see hospital orders.  -worsening ischemic changes in 2nd-5th toes of right foot.  Podiatry and vascular surgery following. Podiatry does not think these changes represent clear signs of infection. They think these correspond to his ongoing vascular diease. Per vascular, patient presents limb salvage approach than amputation.   -Underwent transmetatarsal amputation on 2/11/2025.  Tolerated procedure well.  Intraoperatively no signs of infection.  No further surgery per podiatry.  -Leukocytosis marginally improving postsurgery.  Hemoglobin at 7.5     Multifactorial shock, resolved  ICU admission, weaned off vasopressors 1/30.  -Transitioned to midodrine with increase in prior to admission dose on1/31.  -Monitor  vitals     End-stage renal disease (ESRD), on peritoneal dialysis  Hx of secondary hyperparathyroidism and hyperphosphatemia, phosphorous elevated above baseline of 6 at most recent of 7.9   Nephrology consulted, ongoing follow-up.  Did not require hemodialysis this hospital stay.  -Chronic peritoneal dialysis for the last 3.5 years  -Ongoing peritoneal dialysis, as per nephrology. Patient expressed interest to switch to HD. Nephrology discussed with his primary nephrologist who agrees to transition to hemodialysis.  IR consult has been placed for tunneled catheter placement for 2/12/2025.  Plan on possible hemodialysis from tomorrow.    -Continue midodrine.     Acute hypoxemic respiratory failure  Right pleural effusion  Status post thoracentesis 1/24/25, repeat 2/2/25  -Following surgery on admission by vascular surgery was intubated.  Recent  pneumonia treated with 3 weeks of Ceftin prior to this admission.  No concern for pneumonia per intensivist service.  -Extubated 1/29/25.  -Right pleural effusion.  Status post thoracentesis1/24/25 with 1.5 L clear yellow fluid removed, likely transudative based on low cell count of 117, , and protein of 1.7. Possibly secondary to reduced peritoneal dialysis during hospitalization vs underlying severe HFrEF (30%).  -CT chest 2/1/25, see report, no clear pneumonia; large right pleural effusion noted.  -Wean down oxygen as able, encourage incentive spirometry.  -Antibiotics as above, discontinued by ID 2/2.  -ID consulted 2/1 as above, recommend monitoring patient off antibiotics  -S/p repeat Right thoracentesis on 2/2 with 2.3 L of rolan fluid removed; additional studies, cultures (negative to date), cytology (negative for malignancy)      Paroxysmal atrial fibrillation  Chronic anticoagulation prior to admission, warfarin - ON HOLD  Ischemic cardiomyopathy with HLD, CAD s/p multiple stents, and severe EFrEF (30%)   Wide complex tachycardia 2/1/25  Assessment-postoperatively has been maintained on heparin drip.  Coumadin has been held during his hospital stay.  -Echo 1/31-EF 25 to 30%.  Severe global hypokinesis with abnormal septal motion consistent with conduction abnormality.  Severe inferior wall hypokinesis.  LVH.  RV mildly dilated and mildly reduced RV function.  Mild mitral regurgitation.  Mild to moderate mitral stenosis.  Moderate aortic stenosis.  Left ventricular fairly pressures elevated.  -Episode of wide-complex tachycardia noted by nursing staff, 15 beats, up on 2/1/2025.  -Continue inpatient telemetry.  -Consider follow-up cardiology consultation if recurrent wide-complex tachycardia in the setting of left ventricular dysfunction.  -Restarted prior to admission beta-blocker, Toprol XL, at adjusted dose, monitoring heart rate and blood pressure and consider titrating back up to PTA dose as  indicated.  -Continue IV heparin anticoagulation, with future transition to oral Coumadin as prior to admission when appropriate from vascular surgery/podiatry standpoint.  Heparin drip was discontinued on 2/11/2025 in anticipation for surgery.  Will defer restarting this to podiatry.        Anemia of chronic disease  Baseline around 10.  Has been stable in the 8 range.  No signs of bleeding.  -Hemoglobin trended down to 6.7 on 2/8.  No signs of active bleed.  -s/p 1 unit PRBC  -Nephrology  started patient on EPO on 2/9 and to continue weekly.  -Continue to monitor hemoglobin daily, now stable.  Currently at 7.5 postsurgery  -Will transfuse for hemoglobin less than 7.        Hyperglycemia  Type 2 diabetes   hemoglobin A1c 5.7% October 24  On insulin prior to admission.  - increase lantus from 15U to 20U on 2/6/25 for optimal BG control.   -Continue insulin, adjust as needed.  -Monitor for hypoglycemia.  - continue to monitor BG     Mixed anion gap acidosis  -Improved respiratory function, monitor.  Anion gap resolved 1/31.     Abdominal discomfort 1/30, resolved  Noted to have some right upper quadrant pain 1/30.  Now resolved.  LFTs normal.  Was on Zosyn for above surgical issues.   -Monitor abdominal exam.  -Antibiotics managed as noted above.     Moderate protein calorie nutrition  -Nutrition consulted.  -Speech and language therapy (SLP) consulted, diet per speech therapy.     Gout  -PTA allopurinol currently on hold, reassess daily.     History of renal cell carcinoma   -Status post right nephrectomy, monitor.     History of obstructive sleep apnea.  By report, intolerant of CPAP   -Monitor, follow-up with outpatient provider.     Weakness and deconditioning  -PT, OT, speech and language therapy (SLP) consulted.  -Increase activity as tolerated.     Disposition  Anticipated discharge timing see Disposition Plan below.  -Anticipated discharge to transitional care unit.  -Social work consulted for discharge  "planning.     Goals of care discussion:  Patient had mentioned tonursing staff about \"wanting to die\", aand also mentioned to me he is tired of living like this and he did not think he was going to make it out of the hospital. Palliative consulted. When palliative saw patient the next day, patient appeared to be in a better mood and stated he wanted to continue restorative cares. He has however mentioned to Nephrology that he will like to switch from peritoneal dialysis to HD as it will make him feel less fatigued , if however HD does not help, then he would consider comfort measures.         Clinically Significant Risk Factors     # Hyperkalemia: Highest K = 5.8 mmol/L in last 2 days, will monitor as appropriate  # Hyponatremia: Lowest Na = 134 mmol/L in last 2 days, will monitor as appropriate  # Hypochloremia: Lowest Cl = 95 mmol/L in last 2 days, will monitor as appropriate   # Hypercalcemia: corrected calcium is >10.1, will monitor as appropriate    # Hypoalbuminemia: Lowest albumin = 1.6 g/dL at 2/12/2025  7:26 AM, will monitor as appropriate  # Coagulation Defect: INR = 1.35 (Ref range: 0.85 - 1.15) and/or PTT = 42 Seconds (Ref range: 22 - 38 Seconds), will monitor for bleeding    # Hypertension: Noted on problem list    # Chronic heart failure with reduced ejection fraction: last echo with EF <40%          # DMII: A1C = 6.6 % (Ref range: <5.7 %) within past 6 months    # Severe Malnutrition: based on nutrition assessment      # Financial/Environmental Concerns:            DVT Prophylaxis: Heparin SQ  Code Status: Full Code  Medically Ready for Discharge: Anticipated in 2-4 Days      Please see A&P for additional details of medical decision making.  35 MINUTES SPENT BY ME on the date of service doing chart review, history, exam, documentation & further activities per the note.    Ashley Roper MD, MD  [phone](p)   "

## 2025-02-12 NOTE — PRE-PROCEDURE
GENERAL PRE-PROCEDURE:   Procedure:  Tunneled dialysis catheter placement with image guidance  Date/Time:  2/12/2025 8:21 AM    Written consent obtained?: Yes    Risks and benefits: Risks, benefits and alternatives were discussed    Consent given by:  Patient  Patient states understanding of procedure being performed: Yes    Patient's understanding of procedure matches consent: Yes    Procedure consent matches procedure scheduled: Yes    Expected level of sedation:  Moderate  Appropriately NPO:  Yes  ASA Class:  3  Mallampati  :  Grade 2- soft palate, base of uvula, tonsillar pillars, and portion of posterior pharyngeal wall visible  Lungs:  Lungs clear with good breath sounds bilaterally  Heart:  Normal heart sounds and rate  History & Physical reviewed:  History and physical reviewed and no updates needed  Statement of review:  I have reviewed the lab findings, diagnostic data, medications, and the plan for sedation    Ulices Muir PA-C  Interventional Radiology  [phone] (IR)  *[phone] ([facility] Office)

## 2025-02-12 NOTE — PROGRESS NOTES
Vascular surgery note    I was called about concerns for right groin hematoma.  On my exam this appears unchanged for the past many days.  He is resting comfortably in bed after his recent right transmetatarsal amputation today.  Hemoglobin is also stable.  Okay to resume heparin, no bolus as cleared by podiatry.    Jersey Maki MD

## 2025-02-13 ENCOUNTER — APPOINTMENT (OUTPATIENT)
Dept: PHYSICAL THERAPY | Facility: CLINIC | Age: 65
DRG: 853 | End: 2025-02-13
Attending: INTERNAL MEDICINE
Payer: MEDICARE

## 2025-02-13 VITALS
WEIGHT: 180.34 LBS | DIASTOLIC BLOOD PRESSURE: 72 MMHG | HEART RATE: 102 BPM | OXYGEN SATURATION: 99 % | RESPIRATION RATE: 16 BRPM | BODY MASS INDEX: 23.79 KG/M2 | SYSTOLIC BLOOD PRESSURE: 102 MMHG | TEMPERATURE: 97.2 F

## 2025-02-13 LAB
ABO + RH BLD: NORMAL
ANION GAP SERPL CALCULATED.3IONS-SCNC: 15 MMOL/L (ref 7–15)
BACTERIA TISS BX CULT: NORMAL
BACTERIA TISS BX CULT: NORMAL
BLD GP AB SCN SERPL QL: NEGATIVE
BLD PROD TYP BPU: NORMAL
BLOOD COMPONENT TYPE: NORMAL
BUN SERPL-MCNC: 81.6 MG/DL (ref 8–23)
CALCIUM SERPL-MCNC: 9.3 MG/DL (ref 8.8–10.4)
CHLORIDE SERPL-SCNC: 94 MMOL/L (ref 98–107)
CODING SYSTEM: NORMAL
CREAT SERPL-MCNC: 6.61 MG/DL (ref 0.67–1.17)
CROSSMATCH: NORMAL
EGFRCR SERPLBLD CKD-EPI 2021: 9 ML/MIN/1.73M2
ERYTHROCYTE [DISTWIDTH] IN BLOOD BY AUTOMATED COUNT: 20.7 % (ref 10–15)
GLUCOSE BLDC GLUCOMTR-MCNC: 35 MG/DL (ref 70–99)
GLUCOSE BLDC GLUCOMTR-MCNC: 44 MG/DL (ref 70–99)
GLUCOSE BLDC GLUCOMTR-MCNC: 51 MG/DL (ref 70–99)
GLUCOSE BLDC GLUCOMTR-MCNC: 55 MG/DL (ref 70–99)
GLUCOSE BLDC GLUCOMTR-MCNC: 62 MG/DL (ref 70–99)
GLUCOSE BLDC GLUCOMTR-MCNC: 76 MG/DL (ref 70–99)
GLUCOSE BLDC GLUCOMTR-MCNC: 97 MG/DL (ref 70–99)
GLUCOSE SERPL-MCNC: 107 MG/DL (ref 70–99)
HCO3 SERPL-SCNC: 23 MMOL/L (ref 22–29)
HCT VFR BLD AUTO: 21.8 % (ref 40–53)
HGB BLD-MCNC: 7 G/DL (ref 13.3–17.7)
ISSUE DATE AND TIME: NORMAL
MAGNESIUM SERPL-MCNC: 1.8 MG/DL (ref 1.7–2.3)
MCH RBC QN AUTO: 30.7 PG (ref 26.5–33)
MCHC RBC AUTO-ENTMCNC: 32.1 G/DL (ref 31.5–36.5)
MCV RBC AUTO: 96 FL (ref 78–100)
PHOSPHATE SERPL-MCNC: 6.6 MG/DL (ref 2.5–4.5)
PLATELET # BLD AUTO: 467 10E3/UL (ref 150–450)
POTASSIUM SERPL-SCNC: 6.1 MMOL/L (ref 3.4–5.3)
RBC # BLD AUTO: 2.28 10E6/UL (ref 4.4–5.9)
SODIUM SERPL-SCNC: 132 MMOL/L (ref 135–145)
SPECIMEN EXP DATE BLD: NORMAL
UFH PPP CHRO-ACNC: 0.33 IU/ML
UNIT ABO/RH: NORMAL
UNIT NUMBER: NORMAL
UNIT STATUS: NORMAL
UNIT TYPE ISBT: 5100
WBC # BLD AUTO: 11.7 10E3/UL (ref 4–11)

## 2025-02-13 PROCEDURE — 99233 SBSQ HOSP IP/OBS HIGH 50: CPT | Performed by: INTERNAL MEDICINE

## 2025-02-13 PROCEDURE — P9045 ALBUMIN (HUMAN), 5%, 250 ML: HCPCS | Performed by: INTERNAL MEDICINE

## 2025-02-13 PROCEDURE — 250N000011 HC RX IP 250 OP 636: Performed by: PODIATRIST

## 2025-02-13 PROCEDURE — 99233 SBSQ HOSP IP/OBS HIGH 50: CPT | Performed by: HOSPITALIST

## 2025-02-13 PROCEDURE — 85027 COMPLETE CBC AUTOMATED: CPT | Performed by: HOSPITALIST

## 2025-02-13 PROCEDURE — 250N000013 HC RX MED GY IP 250 OP 250 PS 637: Performed by: INTERNAL MEDICINE

## 2025-02-13 PROCEDURE — 250N000013 HC RX MED GY IP 250 OP 250 PS 637

## 2025-02-13 PROCEDURE — 120N000013 HC R&B IMCU

## 2025-02-13 PROCEDURE — 36415 COLL VENOUS BLD VENIPUNCTURE: CPT | Performed by: HOSPITALIST

## 2025-02-13 PROCEDURE — 36415 COLL VENOUS BLD VENIPUNCTURE: CPT | Performed by: INTERNAL MEDICINE

## 2025-02-13 PROCEDURE — 258N000001 HC RX 258: Performed by: INTERNAL MEDICINE

## 2025-02-13 PROCEDURE — 85520 HEPARIN ASSAY: CPT | Performed by: SURGERY

## 2025-02-13 PROCEDURE — 97110 THERAPEUTIC EXERCISES: CPT | Mod: GP

## 2025-02-13 PROCEDURE — 83735 ASSAY OF MAGNESIUM: CPT | Performed by: INTERNAL MEDICINE

## 2025-02-13 PROCEDURE — 86850 RBC ANTIBODY SCREEN: CPT | Performed by: HOSPITALIST

## 2025-02-13 PROCEDURE — 258N000003 HC RX IP 258 OP 636: Performed by: INTERNAL MEDICINE

## 2025-02-13 PROCEDURE — 82310 ASSAY OF CALCIUM: CPT | Performed by: INTERNAL MEDICINE

## 2025-02-13 PROCEDURE — P9016 RBC LEUKOCYTES REDUCED: HCPCS | Performed by: HOSPITALIST

## 2025-02-13 PROCEDURE — 86923 COMPATIBILITY TEST ELECTRIC: CPT | Performed by: HOSPITALIST

## 2025-02-13 PROCEDURE — 250N000013 HC RX MED GY IP 250 OP 250 PS 637: Performed by: HOSPITALIST

## 2025-02-13 PROCEDURE — 80048 BASIC METABOLIC PNL TOTAL CA: CPT | Performed by: INTERNAL MEDICINE

## 2025-02-13 PROCEDURE — 84100 ASSAY OF PHOSPHORUS: CPT | Performed by: INTERNAL MEDICINE

## 2025-02-13 PROCEDURE — 250N000011 HC RX IP 250 OP 636: Performed by: INTERNAL MEDICINE

## 2025-02-13 PROCEDURE — 250N000013 HC RX MED GY IP 250 OP 250 PS 637: Performed by: PODIATRIST

## 2025-02-13 PROCEDURE — 90937 HEMODIALYSIS REPEATED EVAL: CPT

## 2025-02-13 PROCEDURE — 86900 BLOOD TYPING SEROLOGIC ABO: CPT | Performed by: HOSPITALIST

## 2025-02-13 RX ADMIN — PANTOPRAZOLE SODIUM 40 MG: 40 TABLET, DELAYED RELEASE ORAL at 07:40

## 2025-02-13 RX ADMIN — Medication 12.5 MG: at 12:07

## 2025-02-13 RX ADMIN — Medication: at 09:15

## 2025-02-13 RX ADMIN — Medication 5 MG: at 20:31

## 2025-02-13 RX ADMIN — SEVELAMER CARBONATE 800 MG: 800 TABLET, FILM COATED ORAL at 07:40

## 2025-02-13 RX ADMIN — DEXTROSE MONOHYDRATE 25 ML: 25 INJECTION, SOLUTION INTRAVENOUS at 23:41

## 2025-02-13 RX ADMIN — HEPARIN SODIUM 1750 UNITS/HR: 10000 INJECTION, SOLUTION INTRAVENOUS at 19:17

## 2025-02-13 RX ADMIN — SODIUM CHLORIDE 250 ML: 9 INJECTION, SOLUTION INTRAVENOUS at 09:32

## 2025-02-13 RX ADMIN — ACETAMINOPHEN 975 MG: 325 TABLET, FILM COATED ORAL at 04:48

## 2025-02-13 RX ADMIN — HEPARIN SODIUM 1.8 ML: 1000 INJECTION INTRAVENOUS; SUBCUTANEOUS at 09:35

## 2025-02-13 RX ADMIN — CLOPIDOGREL BISULFATE 75 MG: 75 TABLET ORAL at 20:31

## 2025-02-13 RX ADMIN — ONDANSETRON 4 MG: 2 INJECTION, SOLUTION INTRAMUSCULAR; INTRAVENOUS at 13:06

## 2025-02-13 RX ADMIN — ACETAMINOPHEN 975 MG: 325 TABLET, FILM COATED ORAL at 20:31

## 2025-02-13 RX ADMIN — ATORVASTATIN CALCIUM 40 MG: 40 TABLET, FILM COATED ORAL at 20:31

## 2025-02-13 RX ADMIN — OXYCODONE HYDROCHLORIDE 2.5 MG: 5 TABLET ORAL at 02:50

## 2025-02-13 RX ADMIN — MIDODRINE HYDROCHLORIDE 10 MG: 2.5 TABLET ORAL at 07:40

## 2025-02-13 RX ADMIN — Medication 1 CAPSULE: at 12:08

## 2025-02-13 RX ADMIN — SENNOSIDES AND DOCUSATE SODIUM 1 TABLET: 50; 8.6 TABLET ORAL at 20:31

## 2025-02-13 RX ADMIN — SEVELAMER CARBONATE 800 MG: 800 TABLET, FILM COATED ORAL at 12:08

## 2025-02-13 RX ADMIN — DEXTROSE MONOHYDRATE 25 ML: 25 INJECTION, SOLUTION INTRAVENOUS at 12:56

## 2025-02-13 RX ADMIN — HEPARIN SODIUM 1.8 ML: 1000 INJECTION INTRAVENOUS; SUBCUTANEOUS at 09:33

## 2025-02-13 RX ADMIN — ALBUMIN HUMAN 12.5 G: 0.05 INJECTION, SOLUTION INTRAVENOUS at 09:12

## 2025-02-13 RX ADMIN — HEPARIN SODIUM 1750 UNITS/HR: 10000 INJECTION, SOLUTION INTRAVENOUS at 04:47

## 2025-02-13 RX ADMIN — INSULIN GLARGINE 20 UNITS: 100 INJECTION, SOLUTION SUBCUTANEOUS at 07:50

## 2025-02-13 RX ADMIN — ACETAMINOPHEN 975 MG: 325 TABLET, FILM COATED ORAL at 12:08

## 2025-02-13 RX ADMIN — CALCITRIOL CAPSULES 0.25 MCG 0.25 MCG: 0.25 CAPSULE ORAL at 20:31

## 2025-02-13 ASSESSMENT — ACTIVITIES OF DAILY LIVING (ADL)
ADLS_ACUITY_SCORE: 71
ADLS_ACUITY_SCORE: 69
ADLS_ACUITY_SCORE: 71
ADLS_ACUITY_SCORE: 69
ADLS_ACUITY_SCORE: 69
ADLS_ACUITY_SCORE: 71
ADLS_ACUITY_SCORE: 69
ADLS_ACUITY_SCORE: 71
ADLS_ACUITY_SCORE: 69
ADLS_ACUITY_SCORE: 71
ADLS_ACUITY_SCORE: 71
ADLS_ACUITY_SCORE: 69
ADLS_ACUITY_SCORE: 69

## 2025-02-13 NOTE — PROGRESS NOTES
Care Management Follow Up    Length of Stay (days): 23    Expected Discharge Date: 2025     Concerns to be Addressed: discharge planning     Patient plan of care discussed at interdisciplinary rounds: Yes    Anticipated Discharge Disposition: Skilled Nursing Facility     Anticipated Discharge Services:    Anticipated Discharge DME:      Patient/family educated on Medicare website which has current facility and service quality ratings:    Education Provided on the Discharge Plan:    Patient/Family in Agreement with the Plan: yes    Referrals Placed by CM/SW:    Private pay costs discussed: Not applicable    Discussed  Partnership in Safe Discharge Planning  document with patient/family: No     Handoff Completed: No, handoff not indicated or clinically appropriate    Additional Information:  Writer sent referrals for different plans    First plan - discharge to a TCU that has dialysis on site as patient is very deconditioned and would need to pay for transport (assist of 2 with a lift)     Second plan - find TCU near patients community dialysis - currently [facility] (17 miles from patients home) referrals sent for TCU near [place] would need to arrange medical transport     ADD - Many declines     [facility] [place] will reassess Monday!!       Expand All  Collapse All  Destination    Service Provider Request Status Services Address Phone Fax Patient Preferred   [facility] (SNF) Pending - Request Sent -- [address] [facility] [address] [phone] --   Current Capacity last updated by Tuan Cook RN on 8/3/2024 12:38 PM    TCU Address on different side of bulidin [address] [facility] [address]            [facility] ([facility]) Declined  Cannot meet patient's needs -- [address] [facility] [address] [facility] [id number] [phone] --   [facility]  Bed Not Available -- [address] [facility] [address]  [phone] --   [facility] ([facility]) Declined  Bed Not Available -- [address] [facility] [address] [phone] --   Current Capacity last updated by José Munguia, RN on 9/1/2023  3:01 PM    Has secure memory unit            [facility] ([facility]) Declined  Acuity too high, Cannot meet patient's needs -- [address] [facility], [address] [id number] [phone] --       Next Steps: ask patient financial situation to determine if MA is possible      KATELYN Duarte

## 2025-02-13 NOTE — PROGRESS NOTES
CLINICAL NUTRITION SERVICES - REASSESSMENT NOTE     RECOMMENDATIONS FOR MDs/PROVIDERS TO ORDER:  - Consider appetite stimulant such as Remeron     Malnutrition Status:    Severe malnutrition in the context of acute on chronic illness or injury    Registered Dietitian Interventions:  - Stop Expedite cup - pt has been taking it for a while now and isn't fond of it. OK to stop as long as he is more consistent with drinking the Ensure shake.   - Continue renal multivitamin   - Encourage meals TID even if small       SUBJECTIVE INFORMATION  Assessed patient in room.    CURRENT NUTRITION ORDERS  Diet: 3 g Potassium + Ensure @ 2pm, Expedite Cup @ 10 am     CURRENT INTAKE/TOLERANCE  - Recent flowsheets indicate 25% intakes at best.   - Ordering more soups and soft foods like oatmeal, mashed potatoes, chili.     - Pt reports fair appetite, preferring softer/liquid foods like those listed above.   - He dislikes the Expedite cup supplement (for wound healing) but is willing to try to be more consistent drinking Ensure. He likes the Ensure, but admits he's not drinking it as much as he should.   - today he reports an episode of emesis after gagging on something. Took an antiemetic since then. He denies this as an ongoing problem.   - Pt appears low energy, frail.      NEW FINDINGS  Weight: 81.8 kg - stable from admit weight.   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 Not an ongoing problem. Last BM x1 on 2/11.  Nutrition-relevant labs:  Na 132 (L), K 6.1 (critical H), Phos 6.6 (H). Glucose - low value 35, recheck 51 (L)  Nutrition-relevant medications:  Medium sliding scale insulin + 20 units Lantus. Triphrocaps renal multivit. Renvela.     2/12 - tunneled HD catheter placement for transition from PD to HD.   2/13 - first HD run      MALNUTRITION  % Intake: </= 50% for >/= 5 days (severe)  % Weight Loss: Unable to assess   Subcutaneous Fat Loss: Orbital: Severe and Triceps: Severe  Muscle Loss: Wasting of the temples (temporalis muscle): Severe, Clavicles (pectoralis and deltoids): Severe, Shoulders (deltoids): Severe, Thigh (quadriceps): Severe, and Calf (gastrocnemius): Severe  Fluid Accumulation/Edema: Does not meet criteria  Malnutrition Diagnosis: Severe malnutrition in the context of acute on chronic illness or injury    EVALUATION OF THE PROGRESS TOWARD GOALS   Previous Goals  Patient to consume at least 50% of nutritionally adequate meal trays TID, or the equivalent with supplements/snacks.   Evaluation: Not progressing    Previous Nutrition Diagnosis  Increased nutrient needs related to wound healing, chronic PD as evidenced by low fat/muscle stores, PI to heel   Evaluation: No change    NUTRITION DIAGNOSIS  Inadequate oral intake related to reduced appetite, feeling poorly, increase needs for dialysis and skin as evidenced by intakes have been 25% at best for at least the past 5 days.     INTERVENTIONS  See nutrition interventions above    Goals  Patient to consume at least 50% of nutritionally adequate meal trays TID, or the equivalent with supplements/snacks.     Monitoring/Evaluation      Progress toward goals will be monitored and evaluated per policy.    Allison Garcia RD, RAPHAEL  Pager: [phone]  Available on [employer]

## 2025-02-13 NOTE — PROGRESS NOTES
Inpatient Dialysis Progress Note            Assessment and Plan:   Arnulfo Pritchett is a 64 year old male CAD, HTN, HLD, pafib, HFrEF (EF 20-25%), ESRD on PD, DM2, TALI, RCC s/p R nephrectomy (2022), PAD with multiple LE procedures      1) End stage renal disease on PD  Chronic PD for last 3.5 years.  Home unit [facility], nephrologist Dr. May  Rx: 5 cycles, 2 L fill volumes, 9 hours, last fill 1.2 L with Extraneal.       2) Moderate R pleural effusion: Status post thoracentesis on 1/24 and 2/2.  Transudative  Chest x-ray on 2/5 shows small layering effusion on right side     3) PAD, right heel gangrene, occluded right SFA.  Status post rt fem - below knee popliteal bypass this admission     4) Postsurgical anemia in patient with ESRD-  -status post blood transfusion.  Hemoglobin 7-8.   -EPO 20,000 units 2/9/25.   Now that on hemo will dose in dialysis starting next run as long as he is in hosp.                                Other issues-  Diabetes mellitus  Chronic paroxysmal atrial fibrillation  Secondary hyperparathyroidism and hyperphosphatemia  Severe heart failure with EF of 30% and mild RV dysfunction.      Discussion/plan:  Patient reports he is tired of doing PD and does not think he is thriving on it.  He wants to know if hemodialysis is going to be an option.  If not, he reports he has been thinking about pursuing palliative care, but would like to try hemodialysis first.     He was on hemodialysis before.  He switched to PD to better accommodate his work schedule.  He is no longer working.  He is very frail.  I discussed with him that hemodialysis will still be stressful to his body and he may not do too much better than he is doing on PD.  I spoke to Dr. May who confirms that he is not doing well on PD and she supports the move to HD.     First HD run today.  BP low even before HD started.  Using midodrine and albumin prime.    He is unable to continue PD in his current state, but it  mayh be very difficult to maintain him on HD as well.         Plan:     First HD run today   K 2 bath for K 6.1  Next HD Sat.    Agree that placement in a facility that has HD on site would be best.  [place] ?   LTACH ?    Given persistently high K I will need to limit K in diet.          Interval History:      He says he feels OK.  Straight cath for 450.    BP soft even before HD started.  Albumin prime used.          Dialysis Parameters:     Wt Readings from Last 4 Encounters:   02/13/25 81.8 kg (180 lb 5.4 oz)   01/21/25 75.8 kg (167 lb)   01/03/25 78.9 kg (174 lb)   01/02/25 78.9 kg (173 lb 14.4 oz)     I/O last 3 completed shifts:  In: 120 [P.O.:120]  Out: 0   BP Readings from Last 3 Encounters:   02/13/25 96/72   01/21/25 (!) 125/97   01/06/25 124/88       Routine, ONE TIME, Starting today For 1 Occurrences  Weight Loss (kg): 1  Dialysis Temp: 36.5  C  Access Device: CVC  Access Site: R   Dialyzer: Revaclear  Dialysis Bath: K 2  Blood Flow Rate (mL/min): 300  Total Treatment Time (hrs): 3  Heparin: no         Medications and Allergies:   Reviewed in EPIC    Current Facility-Administered Medications   Medication Dose Route Frequency Provider Last Rate Last Admin    - MEDICATION INSTRUCTIONS for Dialysis Patients -   Does not apply See Admin Instructions Walter Dempsey MD        acetaminophen (TYLENOL) tablet 975 mg  975 mg Oral Q8H Alyce Salas, DPMEGHAN   975 mg at 02/13/25 0448    [Held by provider] allopurinol (ZYLOPRIM) tablet 200 mg  200 mg Oral QPM Walter Dempsey MD   200 mg at 01/26/25 1957    atorvastatin (LIPITOR) tablet 40 mg  40 mg Oral or Feeding Tube At Bedtime Walter Dempsey MD   40 mg at 02/12/25 2010    calcitRIOL (ROCALTROL) capsule 0.25 mcg  0.25 mcg Oral At Bedtime Walter Dempsey MD   0.25 mcg at 02/12/25 2127    cinacalcet (SENSIPAR) tablet 60 mg  60 mg Oral Q Mon Wed Fri AM Walter Dempsey MD   60 mg at 02/12/25 2010    clopidogrel (PLAVIX) tablet 75 mg  75 mg Oral  or Feeding Tube QPM Walter Dempsey MD   75 mg at 02/12/25 2010    epoetin evaristo-epbx (RETACRIT) injection 20,000 Units  20,000 Units Intravenous Weekly Jennifer Jameson MD   20,000 Units at 02/09/25 1739    sodium chloride 0.9% DIALYSIS Cath LOCK - BLUE Lumen  1.3-2.6 mL Intracatheter Once Ulices Muir PA-C        Followed by    heparin 1000 unit/mL DIALYSIS Cath LOCK - BLUE Lumen  3 mL Intracatheter Once Ulices Muir PA-ORI        sodium chloride 0.9% DIALYSIS Cath LOCK - RED Lumen  10 mL Intracatheter Once in dialysis/CRRT aHrish Minor MD        Followed by    heparin 1000 unit/mL DIALYSIS Cath LOCK - RED Lumen  1.3-2.6 mL Intracatheter Once in dialysis/CRRT Harish Minor MD        sodium chloride 0.9% DIALYSIS Cath LOCK - RED Lumen  1.3-2.6 mL Intracatheter Once Ulices Muir PA-C        Followed by    heparin 1000 unit/mL DIALYSIS Cath LOCK - RED Lumen  3 mL Intracatheter Once Ulices Muir PA-C        sodium chloride 0.9% DIALYSIS Cath LOCK - BLUE Lumen  10 mL Intracatheter Once in dialysis/CRRT Harish Minor MD        Followed by    heparin 1000 unit/mL DIALYSIS Cath LOCK -BLUE Lumen  1.3-2.6 mL Intracatheter Once in dialysis/CRRT Harish Minor MD        insulin aspart (NovoLOG) injection (RAPID ACTING)   Subcutaneous TID w/meals Walter Dempsey MD   2 Units at 02/12/25 1700    insulin aspart (NovoLOG) injection (RAPID ACTING)  1-7 Units Subcutaneous TID AC Walter Dempsey MD   1 Units at 02/09/25 1101    insulin aspart (NovoLOG) injection (RAPID ACTING)  1-5 Units Subcutaneous At Bedtime Walter Dempsey MD   1 Units at 02/07/25 0040    insulin glargine (LANTUS PEN) injection 20 Units  20 Units Subcutaneous Daily Afshinu-Heidi Méndez MD   20 Units at 02/13/25 0750    melatonin tablet 5 mg  5 mg Oral At Bedtime Walter Dempsey MD   5 mg at 02/12/25 2127    metoprolol succinate ER (TOPROL-XL) 24 hr half-tab 12.5 mg  12.5 mg Oral Daily  Larry Askew MD   12.5 mg at 02/12/25 0846    midodrine (PROAMATINE) tablet 10 mg  10 mg Oral BID w/meals Walter Dempsey MD   10 mg at 02/13/25 0740    multivitamin RENAL (TRIPHROCAPS) capsule 1 capsule  1 capsule Oral Daily Walter Dempsey MD   1 capsule at 02/12/25 0846    pantoprazole (PROTONIX) EC tablet 40 mg  40 mg Oral QAM AC Carolina Alejandre, YAKELIN   40 mg at 02/13/25 0740    polyethylene glycol (MIRALAX) Packet 17 g  17 g Oral or NG Tube Daily Walter Dempsey MD   17 g at 02/06/25 0839    senna-docusate (SENOKOT-S/PERICOLACE) 8.6-50 MG per tablet 1 tablet  1 tablet Oral BID Alyce Salas DPMEGHAN   1 tablet at 02/11/25 2032    sevelamer carbonate (RENVELA) tablet 800 mg  800 mg Oral TID w/meals Walter Dempsey MD   800 mg at 02/13/25 0740    sodium chloride (PF) 0.9% PF flush 3 mL  3 mL Intracatheter Q8H Walter Dempsey MD   3 mL at 02/11/25 2031    sodium chloride 0.9% BOLUS 200 mL  200 mL Hemodialysis Machine Once Harish Minor MD        sodium chloride 0.9% BOLUS 250 mL  250 mL Intravenous Once in dialysis/CRRT Harish Minor MD        sodium chloride 0.9% BOLUS 500 mL  500 mL Hemodialysis Machine Once Harish Minor MD         Current Facility-Administered Medications   Medication Dose Route Frequency Provider Last Rate Last Admin    acetaminophen (TYLENOL) tablet 650 mg  650 mg Oral or NG Tube Q4H PRN Walter Dempsey MD        Or    acetaminophen (TYLENOL) oral liquid 650 mg  650 mg Per NG tube Q4H PRN Walter Dempsey MD        acetaminophen (TYLENOL) Suppository 650 mg  650 mg Rectal Q4H PRN Walter Dempsey MD        alteplase (CATHFLO ACTIVASE) injection 2 mg  2 mg Intracatheter Q1H PRN Harish Minor MD        alteplase (CATHFLO ACTIVASE) injection 2 mg  2 mg Intracatheter Q1H PRN Harish Minor MD        benzocaine-menthol (CHLORASEPTIC) 6-10 MG lozenge 1-2 lozenge  1-2 lozenge Buccal Q1H PRN Walter Dempsey MD        bisacodyl (DULCOLAX) suppository  10 mg  10 mg Rectal Daily PRN Walter Dempsey MD        calcium carbonate (TUMS) chewable tablet 1,000 mg  1,000 mg Oral 4x Daily PRN Walter Dempsey MD        dextrose 10% infusion   Intravenous Continuous PRN Walter Dempsey MD        glucose gel 15-30 g  15-30 g Oral Q15 Min PRN Walter Dempsey MD   30 g at 02/02/25 1753    Or    dextrose 50 % injection 25-50 mL  25-50 mL Intravenous Q15 Min PRN Walter Dempsey MD   25 mL at 02/01/25 1742    Or    glucagon injection 1 mg  1 mg Subcutaneous Q15 Min PRN Walter Dempsey MD        dianeal PD LOW calcium-1.5% dex (calcium 2.5 mEq/L) 6,000 mL PERITONEAL DIALYSATE   Dialysis [facility] Supplied PD Harish Minor MD        gentamicin (GARAMYCIN) 0.1 % cream   Topical Daily PRN Harish Minor MD        gentamicin (GARAMYCIN) 0.1 % cream   Topical Daily PRN Rosa Barrera MD   Given at 02/08/25 2203    insulin aspart (NovoLOG) injection (RAPID ACTING)   Subcutaneous With Snacks or Supplements Walter Dempsey MD        lidocaine (LMX4) cream   Topical Q1H PRN Walter Dempsey MD        lidocaine 1 % 0.1-1 mL  0.1-1 mL Other Q1H PRN Walter Dempsey MD        magnesium hydroxide (MILK OF MAGNESIA) suspension 30 mL  30 mL Oral Daily PRN Walter Dempsey MD        methocarbamol (ROBAXIN) tablet 500 mg  500 mg Oral or NG Tube TID PRN Walter Dempsey MD   500 mg at 02/12/25 2009    midodrine (PROAMATINE) tablet 2.5 mg  2.5 mg Oral Once PRN Walter Dempsey MD        naloxone (NARCAN) injection 0.2 mg  0.2 mg Intravenous Q2 Min PRN Walter Dempsey MD        Or    naloxone (NARCAN) injection 0.4 mg  0.4 mg Intravenous Q2 Min PRN Walter Dempsey MD        Or    naloxone (NARCAN) injection 0.2 mg  0.2 mg Intramuscular Q2 Min PRN Walter Dempsey MD        Or    naloxone (NARCAN) injection 0.4 mg  0.4 mg Intramuscular Q2 Min PRN Walter Dempsey MD        ondansetron (ZOFRAN ODT) ODT tab 4 mg  4 mg Oral Q6H PRN Walter Dempsey MD   4  mg at 01/24/25 0903    Or    ondansetron (ZOFRAN) injection 4 mg  4 mg Intravenous Q6H PRN Walter Dempsey MD        oxyCODONE IR (ROXICODONE) half-tab 2.5 mg  2.5 mg Oral Q4H PRN Walter Dempsey MD   2.5 mg at 02/13/25 0250    Patient is already receiving anticoagulation with heparin, enoxaparin (LOVENOX), warfarin (COUMADIN)  or other anticoagulant medication   Does not apply Continuous PRN Walter Dempsey MD        polyethylene glycol (MIRALAX) Packet 17 g  17 g Oral Daily PRN Walter Dempsey MD   17 g at 01/31/25 1355    senna-docusate (SENOKOT-S/PERICOLACE) 8.6-50 MG per tablet 1 tablet  1 tablet Oral BID PRN Walter Dempsey MD   1 tablet at 01/31/25 1355    Or    senna-docusate (SENOKOT-S/PERICOLACE) 8.6-50 MG per tablet 2 tablet  2 tablet Oral BID PRN Walter Dempsey MD        sodium chloride (PF) 0.9% PF flush 10 mL  10 mL Intracatheter Q15 Min PRN Harish Minor MD        sodium chloride (PF) 0.9% PF flush 10 mL  10 mL Intracatheter Q15 Min PRN Harish Minor MD        sodium chloride (PF) 0.9% PF flush 3 mL  3 mL Intracatheter q1 min prn Alyce Salas DPM        sodium chloride (PF) 0.9% PF flush 3 mL  3 mL Intracatheter q1 min prn Walter Dempsey MD        sodium chloride 0.9% BOLUS 100-150 mL  100-150 mL Intravenous Q15 Min PRN Harish Minor MD          No Known Allergies           Labs:     [facility]  Recent Labs   Lab 02/13/25  0637 02/12/25  2141 02/12/25  1650 02/12/25  1130 02/12/25  0726 02/11/25  0804 02/11/25  0625 02/10/25  0908 02/10/25  0635   *  --   --   --  134*  --  135  --  134*   POTASSIUM 6.1*  --   --   --  4.9  --  5.8*  --  5.1   CHLORIDE 94*  --   --   --  96*  --  95*  --  94*   TERENCE 9.3  --   --   --  9.6  --  10.1  --  10.0   CO2 23  --   --   --  26  --  26  --  26   BUN 81.6*  --   --   --  69.8*  --  71.8*  --  70.4*   CR 6.61*  --   --   --  5.94*  --  5.85*  --  5.94*   * 97 92 109* 116*   < > 178*   < > 137*    < > = values in this  interval not displayed.     CBC  Recent Labs   Lab 02/12/25  0726 02/11/25  0912 02/10/25  0635 02/09/25  2238 02/09/25  1446 02/09/25  0656 02/08/25  1914 02/08/25  0543   WBC 15.0* 15.8*  --   --   --  15.2*  --  15.2*   HGB 7.5* 8.3* 7.6* 7.9*   < > 8.2*  8.2*   < > 6.7*   HCT 23.6* 25.1*  --   --   --  25.3*  --  20.2*   MCV 95 93  --   --   --  92  --  94   * 545*  --   --   --  560*  --  488*    < > = values in this interval not displayed.     Lab Results   Component Value Date    AST 12 01/30/2025    ALT 6 01/30/2025    ALKPHOS 115 01/30/2025    BILITOTAL 0.2 01/30/2025            Physical Exam:   Vitals were reviewed in [facility]    Wt Readings from Last 3 Encounters:   02/13/25 81.8 kg (180 lb 5.4 oz)   01/21/25 75.8 kg (167 lb)   01/03/25 78.9 kg (174 lb)       Intake/Output Summary (Last 24 hours) at 2/13/2025 0925  Last data filed at 2/13/2025 0011  Gross per 24 hour   Intake 120 ml   Output 0 ml   Net 120 ml       GENERAL APPEARANCE: pleasant, no distress, a & o  HEENT:  Eyes/ears/nose grossly normal, neck supple  RESP: lungs clear to auscultation with good efforts, no crackles, rhonchi or wheezes  CV: regular rate and rhythm, normal S1 S2, no murmur, click or rub   ABDOMEN: soft, nontender, bowel sounds normal  EXTREMITIES/SKIN: tr edema, no rashes or lesions     Pt seen on dialysis.  Stable run.  Good BFR.      Attestation:  I have reviewed today's vital signs, notes, medications, labs and imaging.     Harish Minor MD  [facility] - Nephrology  [phone]

## 2025-02-13 NOTE — PROGRESS NOTES
Potassium   Date Value Ref Range Status   02/13/2025 6.1 (HH) 3.4 - 5.3 mmol/L Final     Hemoglobin   Date Value Ref Range Status   02/12/2025 7.5 (L) 13.3 - 17.7 g/dL Final     Creatinine   Date Value Ref Range Status   02/13/2025 6.61 (H) 0.67 - 1.17 mg/dL Final     Urea Nitrogen   Date Value Ref Range Status   02/13/2025 81.6 (H) 8.0 - 23.0 mg/dL Final     Sodium   Date Value Ref Range Status   02/13/2025 132 (L) 135 - 145 mmol/L Final     INR   Date Value Ref Range Status   02/12/2025 1.35 (H) 0.85 - 1.15 Final     INR (External)   Date Value Ref Range Status   01/14/2025 4.7 (A) 0.9 - 1.1 Final       DIALYSIS PROCEDURE NOTE  Hepatitis status of previous patient on machine log was checked and verified ok to use with this patients hepatitis status.  Patient dialyzed for 3 hrs. on a K2 bath with a net fluid removal of  1L.  A BFR of 300 ml/min was obtained via a RCVC.    The treatment plan was discussed with Dr. Minor during the treatment.    Total heparin received during the treatment: 0 units.     Line flushed, clamped and capped with heparin 1:1000 1.6 mL (1600 units) per lumen    Meds given: Albumin prime   Complications: None      Person educated: Pt. Knowledge base moderate. Barriers to learning: None. Educated on pt via verbal mode. The patient verbalized understanding.   ICEBOAT? Timeout performed pre-treatment  I: Patient was identified using 2 identifiers  C:  Consent Signed Yes  E: Equipment preventative maintenance is current and dialysis delivery system OK to use  B: Hepatitis B Surface Antigen: Non-reactive; Draw Date: 1/22/25      Hepatitis B Surface Antibody: Unknown  O: Dialysis orders present and complete prior to treatment  A: Vascular access verified and assessed prior to treatment  T: Treatment was performed at a clinically appropriate time  ?: Patient was allowed to ask questions and address concerns prior to treatment  See Adult Hemodialysis flowsheet in [id number] for further details and post  assessment.  Machine water alarm in place and functioning. Transducer pods intact and checked every 15min.   Pt assisted with repositioning throughout dialysis treatment.  Pt returned via bed.  Chlorine/Chloramine water system checked every 4 hours.  Outpatient Dialysis TBD.      Post treatment report given to bedside RN (see flowsheet) regarding 1L of fluid removed, last /80.     Fredy Garcia Dialysis RN

## 2025-02-13 NOTE — PROVIDER NOTIFICATION
MD Notification    Notified Person: MD    Notified Person Name: Ervin Rueda MD    Notification Date/Time: 2/13/25 5:40am    Notification Interaction: vocera page    Purpose of Notification: FYI Pt unable to void this shift. Bladder scanned for 453 after pt reported slight bladder discomfort, pt refused straight cath, states they will try to void again before end of shift.     Orders Received:    Comments:

## 2025-02-13 NOTE — PLAN OF CARE
Date & Time: 02/12/25 1129-8250    Summary:    1/21 Admitted for right leg pain and found to have right leg ulcer   1/23 Diagnostic angiogram   1/24 Thoracentesis   1/27 R common femoral and popliteal tibial endarterectomy, R femoral to popliteal/ tibial PTFE bypass- ICU for mechanical Ventilation  2/2 Thoracentesis   2/11 from R transmetatarsal amputation.   2/12 Tunneled dialysis catheter placement    Behavior & Aggression: Green   Fall Risk: Yes   Orientation: A&Ox4  ABNL VS/O2:VSS on 1L for comfort   ABNL Labs: see chart.  Pain Management: PRN oxy x1  Bowel/Bladder: Urinal at bedside, Inc BM  IV/Drains: PIV infusing heparin @ 1750 units/hr. Xa Recheck at 1900  Skin: RLE incision sites are C/D/I. R foot amputation JUAN CARLOS w/ ace wrap, black heel/malleolus wounds. Blanchable redness to coccyx, mepilex in place. R groin site w/ hematoma and increased pain (MD aware). Peritoneal site. Doppler pulses intact.  Diet: Regular  Activity Level: not OOB, up w/ lift, Ax2w/ cares, T&R q2hrs as tolerated.  Tests/Procedures: Dialysis CVC placed 2/12. Will run 2/13   Anticipated  DC Date: TBD.  Significant information: Tolerating regular diet. On Hep gtt. New Dialysis port CDI.

## 2025-02-13 NOTE — PLAN OF CARE
Goal Outcome Evaluation:       Date & Time: 02/12/25 0769-0111    Summary:    1/21 Admitted for right leg pain and found to have right leg ulcer   1/23 Diagnostic angiogram   1/24 Thoracentesis   1/27 R common femoral and popliteal tibial endarterectomy, R femoral to popliteal/ tibial PTFE bypass- ICU for mechanical Ventilation  2/2 Thoracentesis   2/11 from R transmetatarsal amputation.   2/12 Tunneled dialysis catheter placement    Behavior & Aggression: Green   Fall Risk: Yes   Orientation: A&Ox4  ABNL VS/O2:VSS on 1L for comfort   ABNL Labs: see chart.  Pain Management: PRN oxy x2  Bowel/Bladder: Urinal at bedside, unable to void but refused straight cath after bladder scanned for 453, MD notified FYI. No BM this shift.  IV/Drains: PIV infusing heparin @ 1750 units/hr. Awaiting XA recheck results this AM  Skin: RLE incision sites are C/D/I. R foot amputation JUAN CARLOS w/ ace wrap, black heel/malleolus wounds. Blanchable redness to coccyx, mepilex in place. R groin site w/ hematoma and increased pain (MD aware). Peritoneal site. Doppler pulses intact.  Diet: Regular  Activity Level: not OOB, up w/ lift, Ax2w/ cares, T&R q2hrs as tolerated.  Tests/Procedures: N/A   Anticipated  DC Date: TBD.  Significant information: Tolerating regular diet. On Hep gtt. New Dialysis port CDI. Dialysis CVC placed 2/12 in anticipation for HD this AM. Still has PD catheter.

## 2025-02-13 NOTE — PROGRESS NOTES
"[facility] Nurse Inpatient Assessment     S: [facility] team following patient for unstageable pressure injuries to the right heel and right lateral malleolus.     B: Patient is being closely followed by vascular surgery & podiatry this admission for RLE wounds with patient goal of limb salvage.    A: Podiatry has placed wound care orders for the right foot to include painting sites with betadine and application of kerlix & ACE wrap bandage.     R: [facility] team will defer to podiatry and vascular surgery's recommendations at this time and sign off. Please re-consult as needed.     WO team signing off. Please re-consult as needed.     Dior Raphael RN, CWON  Please contact via [employer] at group \"[facility] nurse\"- M-F [id number]-4P       "

## 2025-02-13 NOTE — PROGRESS NOTES
[facility]    Hospitalist Progress Note    Interval History   Patient is awake and alert.  Underwent IR tunneled catheter placement today.  Tolerated procedure well.  Plan on hemodialysis tomorrow.    -Data reviewed today: I reviewed all new labs and imaging results over the last 24 hours. I personally reviewed the chest x-ray image(s) showing small right pleural effusion  .    Physical Exam   Temp: 97.4  F (36.3  C) Temp src: Oral BP: 109/78 Pulse: 98   Resp: 16 SpO2: 100 % O2 Device: Nasal cannula Oxygen Delivery: 1 LPM  Vitals:    02/11/25 0423 02/12/25 0615 02/13/25 0600   Weight: 85 kg (187 lb 6.3 oz) 80.6 kg (177 lb 11.1 oz) 81.8 kg (180 lb 5.4 oz)     Vital Signs with Ranges  Temp:  [96.9  F (36.1  C)-98.7  F (37.1  C)] 97.4  F (36.3  C)  Pulse:  [] 98  Resp:  [7-34] 16  BP: ()/(66-87) 109/78  SpO2:  [96 %-100 %] 100 %  I/O last 3 completed shifts:  In: 120 [P.O.:120]  Out: 1388 [Other:1388]    Physical exam  General-awake and alert.  Appears very frail and thin.  Musculoskeletal-s/p right transmetatarsal amputation.  Skin-right groin surgical site appears erythematous and indurated and tender.  Abdomen-peritoneal dialysis catheter in place.  Soft and nontender.  Chest-S1-S2 heard normal.  Bilateral basal crackles present.  Right-sided tunneled IJ catheter in place.      Medications   Current Facility-Administered Medications   Medication Dose Route Frequency Provider Last Rate Last Admin    dextrose 10% infusion   Intravenous Continuous PRN Walter Dempsey MD        dianeal PD LOW calcium-1.5% dex (calcium 2.5 mEq/L) 6,000 mL PERITONEAL DIALYSATE   Dialysis [facility] Supplied PD Harish Minor MD        heparin 25,000 units in 0.45% NaCl 250 mL ANTICOAGULANT infusion  0-5,000 Units/hr Intravenous Continuous Alyce Salas DPM 17.5 mL/hr at 02/13/25 0447 1,750 Units/hr at 02/13/25 0447    Patient is already receiving anticoagulation with heparin, enoxaparin  (LOVENOX), warfarin (COUMADIN)  or other anticoagulant medication   Does not apply Continuous PRN Walter Dempsey MD         Current Facility-Administered Medications   Medication Dose Route Frequency Provider Last Rate Last Admin    - MEDICATION INSTRUCTIONS for Dialysis Patients -   Does not apply See Admin Instructions Walter Dempsey MD        acetaminophen (TYLENOL) tablet 975 mg  975 mg Oral Q8H CadAlyce hernandez DPM   975 mg at 02/13/25 1208    [Held by provider] allopurinol (ZYLOPRIM) tablet 200 mg  200 mg Oral QPM Walter Dempsey MD   200 mg at 01/26/25 1957    atorvastatin (LIPITOR) tablet 40 mg  40 mg Oral or Feeding Tube At Bedtime Walter Dempsey MD   40 mg at 02/12/25 2010    calcitRIOL (ROCALTROL) capsule 0.25 mcg  0.25 mcg Oral At Bedtime Walter Dempsey MD   0.25 mcg at 02/12/25 2127    cinacalcet (SENSIPAR) tablet 60 mg  60 mg Oral Q Mon Wed Fri AM Walter Dempsey MD   60 mg at 02/12/25 2010    clopidogrel (PLAVIX) tablet 75 mg  75 mg Oral or Feeding Tube QPM Walter Dempsey MD   75 mg at 02/12/25 2010    epoetin evaristo-epbx (RETACRIT) injection 20,000 Units  20,000 Units Intravenous Weekly Jennifer Jameson MD   20,000 Units at 02/09/25 1739    sodium chloride 0.9% DIALYSIS Cath LOCK - BLUE Lumen  1.3-2.6 mL Intracatheter Once Ulices Muir PA-C        Followed by    heparin 1000 unit/mL DIALYSIS Cath LOCK - BLUE Lumen  3 mL Intracatheter Once Ulices Muir PA-C        sodium chloride 0.9% DIALYSIS Cath LOCK - RED Lumen  1.3-2.6 mL Intracatheter Once Ulices Muir PA-C        Followed by    heparin 1000 unit/mL DIALYSIS Cath LOCK - RED Lumen  3 mL Intracatheter Once Ulices Muir PA-C        insulin aspart (NovoLOG) injection (RAPID ACTING)   Subcutaneous TID w/meals Walter Dempsey MD   2 Units at 02/12/25 1700    insulin aspart (NovoLOG) injection (RAPID ACTING)  1-7 Units Subcutaneous TID AC Walter Dempsey E, MD   1 Units at  02/09/25 1101    insulin aspart (NovoLOG) injection (RAPID ACTING)  1-5 Units Subcutaneous At Bedtime Walter Dempsey MD   1 Units at 02/07/25 0040    [START ON 2/14/2025] insulin glargine (LANTUS PEN) injection 10 Units  10 Units Subcutaneous Daily Ashley Roper MD        melatonin tablet 5 mg  5 mg Oral At Bedtime Walter Dempsey MD   5 mg at 02/12/25 2127    metoprolol succinate ER (TOPROL-XL) 24 hr half-tab 12.5 mg  12.5 mg Oral Daily Larry Askew MD   12.5 mg at 02/13/25 1207    midodrine (PROAMATINE) tablet 10 mg  10 mg Oral BID w/meals Walter Dempsey MD   10 mg at 02/13/25 0740    multivitamin RENAL (TRIPHROCAPS) capsule 1 capsule  1 capsule Oral Daily Walter Dempsey MD   1 capsule at 02/13/25 1208    pantoprazole (PROTONIX) EC tablet 40 mg  40 mg Oral QAM  Carolina Alejandre, [facility]   40 mg at 02/13/25 0740    polyethylene glycol (MIRALAX) Packet 17 g  17 g Oral or NG Tube Daily Walter Dempsey MD   17 g at 02/06/25 0839    senna-docusate (SENOKOT-S/PERICOLACE) 8.6-50 MG per tablet 1 tablet  1 tablet Oral BID Alyce Salas, DPM   1 tablet at 02/11/25 2032    sevelamer carbonate (RENVELA) tablet 800 mg  800 mg Oral TID w/meals Walter Dempsey MD   800 mg at 02/13/25 1208    sodium chloride (PF) 0.9% PF flush 3 mL  3 mL Intracatheter Q8H Walter Dempsey MD   3 mL at 02/13/25 1211    sodium chloride 0.9% BOLUS 500 mL  500 mL Hemodialysis Machine Once Harish Minor MD           Data   Recent Labs   Lab 02/13/25  1440 02/13/25  1347 02/13/25  1234 02/13/25  1157 02/13/25  0637 02/12/25  1130 02/12/25  0726 02/11/25  1137 02/11/25 0912 02/11/25  0804 02/11/25  0625   WBC 11.7*  --   --   --   --   --  15.0*  --  15.8*  --   --    HGB 7.0*  --   --   --   --   --  7.5*  --  8.3*  --   --    MCV 96  --   --   --   --   --  95  --  93  --   --    *  --   --   --   --   --  555*  --  545*  --   --    INR  --   --   --   --   --   --  1.35*  --   --   --   --    NA   --   --   --   --  132*  --  134*  --   --   --  135   POTASSIUM  --   --   --   --  6.1*  --  4.9  --   --   --  5.8*   CHLORIDE  --   --   --   --  94*  --  96*  --   --   --  95*   CO2  --   --   --   --  23  --  26  --   --   --  26   BUN  --   --   --   --  81.6*  --  69.8*  --   --   --  71.8*   CR  --   --   --   --  6.61*  --  5.94*  --   --   --  5.85*   ANIONGAP  --   --   --   --  15  --  12  --   --   --  14   TERENCE  --   --   --   --  9.3  --  9.6  --   --   --  10.1   GLC  --  97 51* 35* 107*   < > 116*   < >  --    < > 178*   ALBUMIN  --   --   --   --   --   --  1.6*  --   --   --   --     < > = values in this interval not displayed.       No results found for this or any previous visit (from the past 24 hours).        Assessment & Plan      Arnulfo Pritchett is a 64 year old male with past medical history of severe heart failure (EF 30%), ESRD (on peritoneal dialysis), chronic paroxysmal AF on warfarin, LLE PAD s/p endarterectomy, RCC s/p nephrectomy admitted on 1/21/2025 for septic shock secondary to right leg PAD with worsening right heel necrotic ulcer. The patient was seen in ED at outside hospital on 1/3/25 and diagnosed with pneumonia, later completing course of Cefdinir. He then presented on 1/21/25 to outside hospital for right leg pain and found to have right leg ulcer and transferred to [facility] for vascular surgery evaluation. Now s/p right common femoral BK popliteal/tibial endarterectomy and right common femoral to below-knee popliteal/tibial PTFE bypass performed on 1/27/25. The patient was admitted to the ICU for requirement of mechanical ventilation and pressor support following surgery for multifactorial shock.       Hospitalist service consulted 1/31/2025 for transfer out of ICU and to assist with medical management along with vascular surgery, podiatry and Infectious disease.     Hx PAD of LLE s/p endarterectomy and fem-tibioperoneal trunk bypass on 10/25/24  Hx RLE arterial occlusion  s/p SFA angioplasty and stent 5/2024  PAD RLE, right heel gangrene, occluded SFA, no evidence of osteomyelitis  s/p right common femoral and popliteal/tibial endarterectomy, right femoral to popliteal/tibial PTFE bypass 1/27/25   -Found to have critical limb ischemia on admission on January 21, for details see admission note.   -Distributive shock following surgery followed by the ICU service until 1/31.  -Followed by vascular surgery, podiatry, wound care, and ID.  -Coumadin has been held since admission, now on IV heparin drip.  Transition to warfarin/DOAC per vascular surgery.  -Has been maintained on statin and Plavix 75 mg daily during this hospital stay.  -Recent IV piperacillin/tazobactam (Zosyn), discontinued 2/2 per ID; follow-up cultures, monitor off antibiotics.  -Wound care per surgery and WOC.  -pain control, see hospital orders.  -worsening ischemic changes in 2nd-5th toes of right foot.  Podiatry and vascular surgery following. Podiatry does not think these changes represent clear signs of infection.   -Underwent transmetatarsal amputation on 2/11/2025.  Tolerated procedure well.  Intraoperatively no signs of infection.  No further surgery per podiatry.  -Leukocytosis improved postsurgery.  Hemoglobin trended down to 7 on 2/13/2025.  1 unit packed RBCs given.  -Discussed with vascular surgery regarding transitioning anticoagulation.  Final decision pending.     Multifactorial shock, resolved  ICU admission, weaned off vasopressors 1/30.  -Transitioned to midodrine with increase in prior to admission dose on1/31.  -Monitor  vitals     End-stage renal disease (ESRD), on peritoneal dialysis  Hx of secondary hyperparathyroidism and hyperphosphatemia, phosphorous elevated above baseline of 6 at most recent of 7.9   Nephrology consulted, ongoing follow-up.  Did not require hemodialysis this hospital stay.  -Chronic peritoneal dialysis for the last 3.5 years  -Was ongoing peritoneal dialysis, as per  nephrology. Patient expressed interest to switch to HD. Nephrology discussed with his primary nephrologist who agrees to transition to hemodialysis.  Underwent right tunneled IJ catheter placement by IR on 2/12/2025.  Underwent first session of hemodialysis on 2/13/2025 and tolerated it well.  -Continue midodrine.  -Working on arranging for outpatient hemodialysis.  -Will defer removal of peritoneal dialysis catheter to nephrology.     Acute hypoxemic respiratory failure  Right pleural effusion  Status post thoracentesis 1/24/25, repeat 2/2/25  -Following surgery on admission by vascular surgery was intubated.  Recent pneumonia treated with 3 weeks of Ceftin prior to this admission.  No concern for pneumonia per intensivist service.  -Extubated 1/29/25.  -Right pleural effusion.  Status post thoracentesis1/24/25 with 1.5 L clear yellow fluid removed, likely transudative based on low cell count of 117, , and protein of 1.7. Possibly secondary to reduced peritoneal dialysis during hospitalization vs underlying severe HFrEF (30%).  -CT chest 2/1/25, see report, no clear pneumonia; large right pleural effusion noted.  -Wean down oxygen as able, encourage incentive spirometry.  -Antibiotics as above, discontinued by ID 2/2.  -ID consulted 2/1 as above, recommend monitoring patient off antibiotics  -S/p repeat Right thoracentesis on 2/2 with 2.3 L of rolan fluid removed; additional studies, cultures (negative to date), cytology (negative for malignancy)   -Currently stable on room air.     Paroxysmal atrial fibrillation  Chronic anticoagulation prior to admission, warfarin - ON HOLD  Ischemic cardiomyopathy with HLD, CAD s/p multiple stents, and severe EFrEF (30%)   Wide complex tachycardia 2/1/25  Assessment-postoperatively has been maintained on heparin drip.  Coumadin has been held during his hospital stay.  -Echo 1/31-EF 25 to 30%.  Severe global hypokinesis with abnormal septal motion consistent with  conduction abnormality.  Severe inferior wall hypokinesis.  LVH.  RV mildly dilated and mildly reduced RV function.  Mild mitral regurgitation.  Mild to moderate mitral stenosis.  Moderate aortic stenosis.  Left ventricular fairly pressures elevated.  -Episode of wide-complex tachycardia noted by nursing staff, 15 beats, up on 2/1/2025.  -Continue inpatient telemetry.  -Consider follow-up cardiology consultation if recurrent wide-complex tachycardia in the setting of left ventricular dysfunction.  -Restarted prior to admission beta-blocker, Toprol XL, at adjusted dose, monitoring heart rate and blood pressure and consider titrating back up to PTA dose as indicated.  -Continue IV heparin anticoagulation, with future transition to oral Coumadin as prior to admission when appropriate from vascular surgery/podiatry standpoint.         Anemia of chronic disease  Baseline around 10.  Has been stable in the 8 range.  No signs of bleeding.  -Hemoglobin trended down to 6.7 on 2/8.  No signs of active bleed.  -s/p 1 unit PRBC  -Nephrology  started patient on EPO on 2/9 and to continue weekly.  -Continue to monitor hemoglobin daily,   -Will transfuse for hemoglobin less than 7.  1 unit packed RBCs given on 2/13/2025 for hemoglobin of 7     Hyperglycemia  Type 2 diabetes   hemoglobin A1c 5.7% October 24  On insulin prior to admission.  - increase lantus from 15U to 20U on 2/6/25 for optimal BG control.   -Continue insulin, adjust as needed.  -Monitor for hypoglycemia.  - continue to monitor BG     Mixed anion gap acidosis  -Improved respiratory function, monitor.  Anion gap resolved 1/31.     Abdominal discomfort 1/30, resolved  Noted to have some right upper quadrant pain 1/30.  Now resolved.  LFTs normal.  Was on Zosyn for above surgical issues.   -Monitor abdominal exam.  -Antibiotics managed as noted above.     Moderate protein calorie nutrition  -Nutrition consulted.  -Speech and language therapy (SLP) consulted, diet per  "speech therapy.     Gout  -PTA allopurinol currently on hold, reassess daily.     History of renal cell carcinoma   -Status post right nephrectomy, monitor.     History of obstructive sleep apnea.  By report, intolerant of CPAP   -Monitor, follow-up with outpatient provider.     Weakness and deconditioning  -PT, OT, speech and language therapy (SLP) consulted.  -Increase activity as tolerated.     Disposition  Anticipated discharge timing see Disposition Plan below.  -Anticipated discharge to transitional care unit.  -Social work consulted for discharge planning.     Goals of care discussion:  Patient had mentioned tonursing staff about \"wanting to die\", aand also mentioned to me he is tired of living like this and he did not think he was going to make it out of the hospital. Palliative consulted. When palliative saw patient the next day, patient appeared to be in a better mood and stated he wanted to continue restorative cares. He has however mentioned to Nephrology that he will like to switch from peritoneal dialysis to HD as it will make him feel less fatigued , if however HD does not help, then he would consider comfort measures.         Clinically Significant Risk Factors     # Hyperkalemia: Highest K = 6.1 mmol/L in last 2 days, will monitor as appropriate  # Hyponatremia: Lowest Na = 132 mmol/L in last 2 days, will monitor as appropriate  # Hypochloremia: Lowest Cl = 94 mmol/L in last 2 days, will monitor as appropriate   # Hypercalcemia: corrected calcium is >10.1, will monitor as appropriate    # Hypoalbuminemia: Lowest albumin = 1.6 g/dL at 2/12/2025  7:26 AM, will monitor as appropriate  # Coagulation Defect: INR = 1.35 (Ref range: 0.85 - 1.15) and/or PTT = 42 Seconds (Ref range: 22 - 38 Seconds), will monitor for bleeding    # Hypertension: Noted on problem list    # Chronic heart failure with reduced ejection fraction: last echo with EF <40%          # DMII: A1C = 6.6 % (Ref range: <5.7 %) within past 6 " months    # Severe Malnutrition: based on nutrition assessment      # Financial/Environmental Concerns:            DVT Prophylaxis: Heparin iv  Code Status: Full Code  Medically Ready for Discharge: Anticipated in 2-4 Days      Please see A&P for additional details of medical decision making.  35 MINUTES SPENT BY ME on the date of service doing chart review, history, exam, documentation & further activities per the note.    Ashley Roper MD, MD  [phone](p)

## 2025-02-13 NOTE — PROGRESS NOTES
VASCULAR SURGERY PROGRESS NOTE    Subjective:  No acute events overnight. Right groin hematoma stable on exam. CVC with IR yesterday and reports plans for use today.     Objective:  Intake/Output Summary (Last 24 hours) at 2/11/2025 0252  Last data filed at 2/10/2025 1955  Gross per 24 hour   Intake --   Output 529 ml   Net -529 ml       PHYSICAL EXAM:  /76 (BP Location: Right arm, Patient Position: Semi-Soto's)   Pulse 109   Temp 98.7  F (37.1  C) (Axillary)   Resp 18   Wt 81.8 kg (180 lb 5.4 oz)   SpO2 96%   BMI 23.79 kg/m    Alert and oriented x4, neurologically intact  Nonlabored breathing, on 1L NC, afebrile.  Abdomen remains soft, nondistended, nontender, groin incision intact, incisions intact  R lower extremity has monophasic AT signal   Groin site remains soft, mildly tender, hematoma present-improved  S/p R TMA dressings intact    ASSESSMENT:  64 year old male who is POD15 s/p R fem-BK pop bypass with PTFE and proximal short GSV interposition graft. Out of ICU in [facility]. Worsening R pleural effusion of unclear etiology despite thoracentesis, most recent thoracentesis on 2/2. Patient opting for attempted limb salvage rather than amputation. S/p Right TMA 2/11/2025    Pending AM labs    PLAN:  -oob, physical therapy  -ongoing leukocytosis, continue to trend  -plavix, heparin gtt  -heel stable  -at this time limb salvage  -appreciate all teams involved    Evelia Appiah PA-C  Vascular Surgery

## 2025-02-14 ENCOUNTER — APPOINTMENT (OUTPATIENT)
Dept: OCCUPATIONAL THERAPY | Facility: CLINIC | Age: 65
End: 2025-02-14
Attending: INTERNAL MEDICINE
Payer: MEDICARE

## 2025-02-14 LAB
ANION GAP SERPL CALCULATED.3IONS-SCNC: 10 MMOL/L (ref 7–15)
BUN SERPL-MCNC: 44 MG/DL (ref 8–23)
CALCIUM SERPL-MCNC: 9.7 MG/DL (ref 8.8–10.4)
CHLORIDE SERPL-SCNC: 95 MMOL/L (ref 98–107)
CREAT SERPL-MCNC: 4.28 MG/DL (ref 0.67–1.17)
EGFRCR SERPLBLD CKD-EPI 2021: 15 ML/MIN/1.73M2
ERYTHROCYTE [DISTWIDTH] IN BLOOD BY AUTOMATED COUNT: 20.9 % (ref 10–15)
GLUCOSE BLDC GLUCOMTR-MCNC: 101 MG/DL (ref 70–99)
GLUCOSE BLDC GLUCOMTR-MCNC: 102 MG/DL (ref 70–99)
GLUCOSE BLDC GLUCOMTR-MCNC: 114 MG/DL (ref 70–99)
GLUCOSE BLDC GLUCOMTR-MCNC: 141 MG/DL (ref 70–99)
GLUCOSE BLDC GLUCOMTR-MCNC: 154 MG/DL (ref 70–99)
GLUCOSE BLDC GLUCOMTR-MCNC: 61 MG/DL (ref 70–99)
GLUCOSE BLDC GLUCOMTR-MCNC: 78 MG/DL (ref 70–99)
GLUCOSE BLDC GLUCOMTR-MCNC: 97 MG/DL (ref 70–99)
GLUCOSE SERPL-MCNC: 89 MG/DL (ref 70–99)
HCO3 SERPL-SCNC: 26 MMOL/L (ref 22–29)
HCT VFR BLD AUTO: 27 % (ref 40–53)
HGB BLD-MCNC: 8.4 G/DL (ref 13.3–17.7)
INR PPP: 1.37 (ref 0.85–1.15)
MAGNESIUM SERPL-MCNC: 1.9 MG/DL (ref 1.7–2.3)
MCH RBC QN AUTO: 29.2 PG (ref 26.5–33)
MCHC RBC AUTO-ENTMCNC: 31.1 G/DL (ref 31.5–36.5)
MCV RBC AUTO: 94 FL (ref 78–100)
PHOSPHATE SERPL-MCNC: 5.5 MG/DL (ref 2.5–4.5)
PLATELET # BLD AUTO: 469 10E3/UL (ref 150–450)
POTASSIUM SERPL-SCNC: 5.3 MMOL/L (ref 3.4–5.3)
RBC # BLD AUTO: 2.88 10E6/UL (ref 4.4–5.9)
SODIUM SERPL-SCNC: 131 MMOL/L (ref 135–145)
UFH PPP CHRO-ACNC: 0.38 IU/ML
WBC # BLD AUTO: 10.6 10E3/UL (ref 4–11)

## 2025-02-14 PROCEDURE — 250N000013 HC RX MED GY IP 250 OP 250 PS 637: Performed by: HOSPITALIST

## 2025-02-14 PROCEDURE — 250N000013 HC RX MED GY IP 250 OP 250 PS 637: Performed by: INTERNAL MEDICINE

## 2025-02-14 PROCEDURE — 250N000013 HC RX MED GY IP 250 OP 250 PS 637: Performed by: STUDENT IN AN ORGANIZED HEALTH CARE EDUCATION/TRAINING PROGRAM

## 2025-02-14 PROCEDURE — 84100 ASSAY OF PHOSPHORUS: CPT | Performed by: INTERNAL MEDICINE

## 2025-02-14 PROCEDURE — 82310 ASSAY OF CALCIUM: CPT | Performed by: INTERNAL MEDICINE

## 2025-02-14 PROCEDURE — 82565 ASSAY OF CREATININE: CPT | Performed by: INTERNAL MEDICINE

## 2025-02-14 PROCEDURE — 36415 COLL VENOUS BLD VENIPUNCTURE: CPT | Performed by: INTERNAL MEDICINE

## 2025-02-14 PROCEDURE — 97110 THERAPEUTIC EXERCISES: CPT | Mod: GO

## 2025-02-14 PROCEDURE — 83735 ASSAY OF MAGNESIUM: CPT | Performed by: INTERNAL MEDICINE

## 2025-02-14 PROCEDURE — 85610 PROTHROMBIN TIME: CPT | Performed by: HOSPITALIST

## 2025-02-14 PROCEDURE — 250N000011 HC RX IP 250 OP 636: Performed by: PODIATRIST

## 2025-02-14 PROCEDURE — 85520 HEPARIN ASSAY: CPT | Performed by: SURGERY

## 2025-02-14 PROCEDURE — 250N000013 HC RX MED GY IP 250 OP 250 PS 637

## 2025-02-14 PROCEDURE — 80048 BASIC METABOLIC PNL TOTAL CA: CPT | Performed by: INTERNAL MEDICINE

## 2025-02-14 PROCEDURE — 250N000013 HC RX MED GY IP 250 OP 250 PS 637: Performed by: PODIATRIST

## 2025-02-14 PROCEDURE — 97535 SELF CARE MNGMENT TRAINING: CPT | Mod: GO

## 2025-02-14 PROCEDURE — 99233 SBSQ HOSP IP/OBS HIGH 50: CPT | Performed by: HOSPITALIST

## 2025-02-14 PROCEDURE — 85027 COMPLETE CBC AUTOMATED: CPT | Performed by: HOSPITALIST

## 2025-02-14 PROCEDURE — 36415 COLL VENOUS BLD VENIPUNCTURE: CPT | Performed by: HOSPITALIST

## 2025-02-14 PROCEDURE — 999N000040 HC STATISTIC CONSULT NO CHARGE VASC ACCESS

## 2025-02-14 PROCEDURE — 120N000013 HC R&B IMCU

## 2025-02-14 RX ADMIN — WARFARIN SODIUM 7.5 MG: 5 TABLET ORAL at 18:07

## 2025-02-14 RX ADMIN — POLYETHYLENE GLYCOL 3350 17 G: 17 POWDER, FOR SOLUTION ORAL at 08:13

## 2025-02-14 RX ADMIN — MIDODRINE HYDROCHLORIDE 10 MG: 2.5 TABLET ORAL at 18:07

## 2025-02-14 RX ADMIN — Medication 5 MG: at 21:31

## 2025-02-14 RX ADMIN — ACETAMINOPHEN 975 MG: 325 TABLET, FILM COATED ORAL at 13:24

## 2025-02-14 RX ADMIN — HEPARIN SODIUM 1750 UNITS/HR: 10000 INJECTION, SOLUTION INTRAVENOUS at 10:09

## 2025-02-14 RX ADMIN — CLOPIDOGREL BISULFATE 75 MG: 75 TABLET ORAL at 21:31

## 2025-02-14 RX ADMIN — PANTOPRAZOLE SODIUM 40 MG: 40 TABLET, DELAYED RELEASE ORAL at 08:15

## 2025-02-14 RX ADMIN — ACETAMINOPHEN 975 MG: 325 TABLET, FILM COATED ORAL at 21:30

## 2025-02-14 RX ADMIN — SEVELAMER CARBONATE 800 MG: 800 TABLET, FILM COATED ORAL at 18:07

## 2025-02-14 RX ADMIN — ATORVASTATIN CALCIUM 40 MG: 40 TABLET, FILM COATED ORAL at 21:31

## 2025-02-14 RX ADMIN — CINACALCET 60 MG: 30 TABLET ORAL at 21:31

## 2025-02-14 RX ADMIN — Medication 12.5 MG: at 08:15

## 2025-02-14 RX ADMIN — SEVELAMER CARBONATE 800 MG: 800 TABLET, FILM COATED ORAL at 13:24

## 2025-02-14 RX ADMIN — Medication 1 CAPSULE: at 08:15

## 2025-02-14 RX ADMIN — SEVELAMER CARBONATE 800 MG: 800 TABLET, FILM COATED ORAL at 10:07

## 2025-02-14 RX ADMIN — CALCITRIOL CAPSULES 0.25 MCG 0.25 MCG: 0.25 CAPSULE ORAL at 22:08

## 2025-02-14 RX ADMIN — MIDODRINE HYDROCHLORIDE 10 MG: 2.5 TABLET ORAL at 08:15

## 2025-02-14 RX ADMIN — SENNOSIDES AND DOCUSATE SODIUM 1 TABLET: 50; 8.6 TABLET ORAL at 08:14

## 2025-02-14 ASSESSMENT — ACTIVITIES OF DAILY LIVING (ADL)
ADLS_ACUITY_SCORE: 71
ADLS_ACUITY_SCORE: 71
ADLS_ACUITY_SCORE: 69
ADLS_ACUITY_SCORE: 71
ADLS_ACUITY_SCORE: 69
ADLS_ACUITY_SCORE: 71
ADLS_ACUITY_SCORE: 71
ADLS_ACUITY_SCORE: 69
ADLS_ACUITY_SCORE: 69
ADLS_ACUITY_SCORE: 71
ADLS_ACUITY_SCORE: 69

## 2025-02-14 NOTE — PLAN OF CARE
VSS on 1L NC. A&O x4. Denies pain. Low blood sugars overnight, D50 given prior to shift for glucose of 44. Courtesy meal, orange juice and pudding given to keep blood sugar up overnight. Patient unable to feel low sugar. Repositioned with assist of 2. Needs encouragement. Dialysis access in right chest CDI. Heparin drip infusing, heparin 10a recheck in AM. Up with assist of 2, oscar mast.

## 2025-02-14 NOTE — PROVIDER NOTIFICATION
Dr Roper notified via [employer] FYI about low blood sugars overnight and D50 given x1. Please advise on lantus this AM. Next shift RN updated.     Order to hold scheduled lantus.

## 2025-02-14 NOTE — PLAN OF CARE
Orientation: A&Ox4    Vitals/Tele: VSS 1L NC. RLE foot pain managed with scheduled tylenol.    IV Access/drains: PIV Hep 1750u. HD catheter R chest covered with gauze. PD dressing CDI.    Diet: low potassium, regular    Mobility: A2 lift/sera steady    GI/: Voiding small amount. BS: 200 this shift. No BM. Pt now on HD.    Wound/Skin: Scattered bruising, scabs. RLE foot dressing CDI.   BLE numbness, tingling - R>L. Right groin site nolvia erythema, scabbing, swelling, tender - no change.     Consults: SPL, PT/OT, Orthotics, SW.    Discharge Plan: TCU/LTC pending progress.       See Flow sheets for assessment

## 2025-02-14 NOTE — PROVIDER NOTIFICATION
MD Notification    Notified Person: MD    Notified Person Name: YISEL Wiley DOWD    Notification Date/Time: 2/13/25  8050    Notification Interaction: Vocera msg    Purpose of Notification: BG 44.    Orders Received: MD acknowledged page. Will update if needed.     Comments: PRN D50 given 25ml. Pt A&Ox4, and asymptomatic. Handoff RN will recheck BG in 15min. Handoff given to Winifred WETZEL RN.

## 2025-02-14 NOTE — PROGRESS NOTES
VASCULAR SURGERY PROGRESS NOTE    Subjective:  No acute events overnight. Right groin hematoma stable on exam. Per RN, low blood sugars overnight. 1 unit PRBCs given yesterday for hgb 7.0.    Objective:  Intake/Output Summary (Last 24 hours) at 2/11/2025 0252  Last data filed at 2/10/2025 1955  Gross per 24 hour   Intake --   Output 529 ml   Net -529 ml       PHYSICAL EXAM:  /72 (BP Location: Right arm)   Pulse 102   Temp 97.2  F (36.2  C) (Oral)   Resp 16   Wt 81.8 kg (180 lb 5.4 oz)   SpO2 99%   BMI 23.79 kg/m    Alert and oriented x4, neurologically intact  Nonlabored breathing, on 1L NC, afebrile.  Abdomen remains soft, nondistended, nontender, groin incision intact, incisions intact  R lower extremity has monophasic AT signal   Groin site remains soft, mildly tender, hematoma present-improved  S/p R TMA dressings intact    ASSESSMENT:  64 year old male who is POD15 s/p R fem-BK pop bypass with PTFE and proximal short GSV interposition graft. Out of ICU in [facility]. Worsening R pleural effusion of unclear etiology despite thoracentesis, most recent thoracentesis on 2/2. Patient opting for attempted limb salvage rather than amputation. S/p Right TMA 2/11/2025      PLAN:  -oob, physical therapy  - hgb 8.4 today followng 1 unit PRBCs yesterday  -plavix, heparin gtt - ok to resume warfarin with heparin bridge  -heel stable  -at this time limb salvage  -appreciate all teams involved    Evelia Appiah PA-C  Vascular Surgery

## 2025-02-14 NOTE — PHARMACY-ANTICOAGULATION SERVICE
Clinical Pharmacy - Warfarin Dosing Consult     Pharmacy has been consulted to manage this patient s warfarin therapy.  Indication: Atrial Fibrillation  Therapy Goal: INR 2-3  Warfarin Prior to Admission: Yes  Warfarin PTA Regimen: 7.5 mg on Mondays and Fridays, 5 mg the rest of the days of the week    INR   Date Value Ref Range Status   02/14/2025 1.37 (H) 0.85 - 1.15 Final   02/12/2025 1.35 (H) 0.85 - 1.15 Final       Recommend warfarin 7.5 mg today.  Pharmacy will monitor Arnulfo D Shreyas daily and order warfarin doses to achieve specified goal.      Please contact pharmacy as soon as possible if the warfarin needs to be held for a procedure or if the warfarin goals change.

## 2025-02-14 NOTE — PLAN OF CARE
Date & Time: 02/13/25 [id number]    Summary:    1/21 Admitted for right leg pain and found to have right leg ulcer   1/23 Diagnostic angiogram   1/24 Thoracentesis   1/27 R common femoral and popliteal tibial endarterectomy, R femoral to popliteal/ tibial PTFE bypass- ICU for mechanical Ventilation  2/2 Thoracentesis   2/11 from R transmetatarsal amputation.   2/12 Tunneled dialysis catheter placement    Behavior & Aggression: Green   Fall Risk: Yes   Orientation: A&Ox4  ABNL VS/O2:VSS on RA  ABNL Labs: Hgb 7.0  Pain Management: scheduled meds  Bowel/Bladder: straight cath at 0730 for 600. No BM  IV/Drains: PIV infusing heparin @ 1750 units/hr. Xa recheck in AM  Skin: RLE incision sites are C/D/I. R foot amputation JUAN CARLOS w/ ace wrap, black heel/malleolus wounds. Blanchable redness to coccyx, mepilex in place. R groin site w/ hematoma and increased pain (MD aware). Peritoneal site. Doppler pulses intact.  Diet: Regular  Activity Level: Not OOB, up w/ lift, Ax2w/ cares, T&R q2hrs as tolerated.  Tests/Procedures: N/A   Anticipated  DC Date: TBD.  Significant information: Tolerating regular low K diet. On Hep gtt. New Dialysis port CDI. Dialysis this AM 1L removed. Still has PD catheter. BG 35 coming back from dialysis. Nephrologist notified. Lantus reduced. 1Unit blood given.

## 2025-02-14 NOTE — PROGRESS NOTES
Primary nursing advised that typically, CVC dressings are once every 7 days, unless reason to change dressing earlier, such as lack of integrity of dressing.  Also advised that, currently, IV team does not follow internal jugular dressing changes through the hospital.

## 2025-02-14 NOTE — PROGRESS NOTES
[facility]    Hospitalist Progress Note    Interval History   Patient is awake and alert.  Had a few episodes of hypoglycemia overnight.  Likely secondary to hemodialysis.  Otherwise baseline with significant fatigue.  Continues to have some pain with his right groin and can minimally move his right leg.    -Data reviewed today: I reviewed all new labs and imaging results over the last 24 hours. I personally reviewed the chest x-ray image(s) showing small right pleural effusion  .    Physical Exam   Temp: 97.8  F (36.6  C) Temp src: Oral BP: 120/85 Pulse: 109   Resp: 16 SpO2: 98 % O2 Device: None (Room air) Oxygen Delivery: 1 LPM  Vitals:    02/11/25 0423 02/12/25 0615 02/13/25 0600   Weight: 85 kg (187 lb 6.3 oz) 80.6 kg (177 lb 11.1 oz) 81.8 kg (180 lb 5.4 oz)     Vital Signs with Ranges  Temp:  [97.2  F (36.2  C)-98.2  F (36.8  C)] 97.8  F (36.6  C)  Pulse:  [] 109  Resp:  [16] 16  BP: (102-120)/(72-85) 120/85  SpO2:  [95 %-99 %] 98 %  I/O last 3 completed shifts:  In: 480 [P.O.:480]  Out: 150 [Urine:150]    Physical exam  General-awake and alert.  Appears very frail and thin.  Musculoskeletal-s/p right transmetatarsal amputation.  Skin-right groin surgical site appears erythematous and indurated and tender.  Abdomen-peritoneal dialysis catheter in place.  Soft and nontender.  Chest-S1-S2 heard normal.  Bilateral basal crackles present.  Right-sided tunneled IJ catheter in place.      Medications   Current Facility-Administered Medications   Medication Dose Route Frequency Provider Last Rate Last Admin    dextrose 10% infusion   Intravenous Continuous PRN Walter Dempsey MD        dianeal PD LOW calcium-1.5% dex (calcium 2.5 mEq/L) 6,000 mL PERITONEAL DIALYSATE   Dialysis [facility] Supplied PD Harish Minor MD        heparin 25,000 units in 0.45% NaCl 250 mL ANTICOAGULANT infusion  0-5,000 Units/hr Intravenous Continuous Alyce Salas DPM 17.5 mL/hr at 02/14/25 1009 1,750  Units/hr at 02/14/25 1009    Patient is already receiving anticoagulation with heparin, enoxaparin (LOVENOX), warfarin (COUMADIN)  or other anticoagulant medication   Does not apply Continuous PRN Walter Dempsey MD         Current Facility-Administered Medications   Medication Dose Route Frequency Provider Last Rate Last Admin    - MEDICATION INSTRUCTIONS for Dialysis Patients -   Does not apply See Admin Instructions Walter Dempsey MD        acetaminophen (TYLENOL) tablet 975 mg  975 mg Oral Q8H CadAlyce hernandez, DPM   975 mg at 02/14/25 1324    [Held by provider] allopurinol (ZYLOPRIM) tablet 200 mg  200 mg Oral QPM Walter Dempsey MD   200 mg at 01/26/25 1957    atorvastatin (LIPITOR) tablet 40 mg  40 mg Oral or Feeding Tube At Bedtime Walter Dempsey MD   40 mg at 02/13/25 2031    calcitRIOL (ROCALTROL) capsule 0.25 mcg  0.25 mcg Oral At Bedtime Walter Dempsey MD   0.25 mcg at 02/13/25 2031    cinacalcet (SENSIPAR) tablet 60 mg  60 mg Oral Q Mon Wed Fri AM Walter Dempsey MD   60 mg at 02/12/25 2010    clopidogrel (PLAVIX) tablet 75 mg  75 mg Oral or Feeding Tube QPM Walter Dempsey MD   75 mg at 02/13/25 2031    epoetin evaristo-epbx (RETACRIT) injection 20,000 Units  20,000 Units Intravenous Weekly Jennifer Jameson MD   20,000 Units at 02/09/25 1739    sodium chloride 0.9% DIALYSIS Cath LOCK - BLUE Lumen  1.3-2.6 mL Intracatheter Once Ulices Muir PA-C        Followed by    heparin 1000 unit/mL DIALYSIS Cath LOCK - BLUE Lumen  3 mL Intracatheter Once Ulices Muir PA-C        sodium chloride 0.9% DIALYSIS Cath LOCK - RED Lumen  1.3-2.6 mL Intracatheter Once Ulices Muir PA-C        Followed by    heparin 1000 unit/mL DIALYSIS Cath LOCK - RED Lumen  3 mL Intracatheter Once Ulices Muir PA-C        [Held by provider] insulin aspart (NovoLOG) injection (RAPID ACTING)   Subcutaneous TID w/meals Walter Dempsey MD   2 Units at 02/12/25 1703     insulin aspart (NovoLOG) injection (RAPID ACTING)  1-7 Units Subcutaneous TID AC Walter Dempsey MD   1 Units at 02/09/25 1101    insulin aspart (NovoLOG) injection (RAPID ACTING)  1-5 Units Subcutaneous At Bedtime Walter Dempsey MD   1 Units at 02/07/25 0040    [Held by provider] insulin glargine (LANTUS PEN) injection 10 Units  10 Units Subcutaneous Daily Ashley Roper MD        melatonin tablet 5 mg  5 mg Oral At Bedtime Walter Dempsey MD   5 mg at 02/13/25 2031    metoprolol succinate ER (TOPROL-XL) 24 hr half-tab 12.5 mg  12.5 mg Oral Daily Larry Askew MD   12.5 mg at 02/14/25 0815    midodrine (PROAMATINE) tablet 10 mg  10 mg Oral BID w/meals Walter Dempsey MD   10 mg at 02/14/25 0815    multivitamin RENAL (TRIPHROCAPS) capsule 1 capsule  1 capsule Oral Daily Walter Dempsey MD   1 capsule at 02/14/25 0815    pantoprazole (PROTONIX) EC tablet 40 mg  40 mg Oral QAM AC Carolina Alejandre, [facility]   40 mg at 02/14/25 0815    polyethylene glycol (MIRALAX) Packet 17 g  17 g Oral or NG Tube Daily Walter Dempsey MD   17 g at 02/14/25 0813    senna-docusate (SENOKOT-S/PERICOLACE) 8.6-50 MG per tablet 1 tablet  1 tablet Oral BID Alyce Salas, DPM   1 tablet at 02/14/25 0814    sevelamer carbonate (RENVELA) tablet 800 mg  800 mg Oral TID w/meals Walter Dempsey MD   800 mg at 02/14/25 1324    sodium chloride (PF) 0.9% PF flush 3 mL  3 mL Intracatheter Q8H Walter Dempsey MD   3 mL at 02/13/25 2043    sodium chloride 0.9% BOLUS 500 mL  500 mL Hemodialysis Machine Once Harish Minor MD        warfarin ANTICOAGULANT (COUMADIN) tablet 7.5 mg  7.5 mg Oral ONCE at 18:00 Melissa Macias MD        Warfarin Dose Required Daily - Pharmacist Managed  1 each Oral See Admin Instructions Ashley Roper MD           Data   Recent Labs   Lab 02/14/25  1220 02/14/25  0912 02/14/25  0802 02/14/25  0723 02/13/25  1610 02/13/25  1440 02/13/25  1157 02/13/25  0637 02/12/25  1130  02/12/25  0726   WBC  --   --   --  10.6  --  11.7*  --   --   --  15.0*   HGB  --   --   --  8.4*  --  7.0*  --   --   --  7.5*   MCV  --   --   --  94  --  96  --   --   --  95   PLT  --   --   --  469*  --  467*  --   --   --  555*   INR  --  1.37*  --   --   --   --   --   --   --  1.35*   NA  --   --   --  131*  --   --   --  132*  --  134*   POTASSIUM  --   --   --  5.3  --   --   --  6.1*  --  4.9   CHLORIDE  --   --   --  95*  --   --   --  94*  --  96*   CO2  --   --   --  26  --   --   --  23  --  26   BUN  --   --   --  44.0*  --   --   --  81.6*  --  69.8*   CR  --   --   --  4.28*  --   --   --  6.61*  --  5.94*   ANIONGAP  --   --   --  10  --   --   --  15  --  12   TERENCE  --   --   --  9.7  --   --   --  9.3  --  9.6   *  --  78 89   < >  --    < > 107*   < > 116*   ALBUMIN  --   --   --   --   --   --   --   --   --  1.6*    < > = values in this interval not displayed.       No results found for this or any previous visit (from the past 24 hours).        Assessment & Plan      Arnulfo Pritchett is a 64 year old male with past medical history of severe heart failure (EF 30%), ESRD (on peritoneal dialysis), chronic paroxysmal AF on warfarin, LLE PAD s/p endarterectomy, RCC s/p nephrectomy admitted on 1/21/2025 for septic shock secondary to right leg PAD with worsening right heel necrotic ulcer. The patient was seen in ED at outside hospital on 1/3/25 and diagnosed with pneumonia, later completing course of Cefdinir. He then presented on 1/21/25 to outside hospital for right leg pain and found to have right leg ulcer and transferred to [facility] for vascular surgery evaluation. Now s/p right common femoral BK popliteal/tibial endarterectomy and right common femoral to below-knee popliteal/tibial PTFE bypass performed on 1/27/25. The patient was admitted to the ICU for requirement of mechanical ventilation and pressor support following surgery for multifactorial shock.       Hospitalist service  consulted 1/31/2025 for transfer out of ICU and to assist with medical management along with vascular surgery, podiatry and Infectious disease.     Hx PAD of LLE s/p endarterectomy and fem-tibioperoneal trunk bypass on 10/25/24  Hx RLE arterial occlusion s/p SFA angioplasty and stent 5/2024  PAD RLE, right heel gangrene, occluded SFA, no evidence of osteomyelitis  s/p right common femoral and popliteal/tibial endarterectomy, right femoral to popliteal/tibial PTFE bypass 1/27/25   -Found to have critical limb ischemia on admission on January 21, for details see admission note.   -Distributive shock following surgery followed by the ICU service until 1/31.  -Followed by vascular surgery, podiatry, wound care, and ID.  -Coumadin has been held since admission, was was started on IV heparin drip since admission.  Restarted warfarin on 2/14/2025 after discussion with vascular surgery.  Continue heparin drip until INR is therapeutic.  -Has been maintained on statin and Plavix 75 mg daily during this hospital stay.  -Recent IV piperacillin/tazobactam (Zosyn), discontinued 2/2 per ID; follow-up cultures, monitor off antibiotics.  -Wound care per surgery and WOC.  -pain control, see hospital orders.  -worsening ischemic changes in 2nd-5th toes of right foot.  Podiatry and vascular surgery following.  -Underwent transmetatarsal amputation on 2/11/2025.  Tolerated procedure well.  Intraoperatively no signs of infection.  No further surgery per podiatry.  -Leukocytosis improved postsurgery.  Hemoglobin trended down to 7 on 2/13/2025.  1 unit packed RBCs given.  Hemoglobin improved to 8.4    Multifactorial shock, resolved  ICU admission, weaned off vasopressors 1/30.  -Transitioned to midodrine with increase in prior to admission dose on1/31.  -Monitor  vitals     End-stage renal disease (ESRD), on peritoneal dialysis  Hx of secondary hyperparathyroidism and hyperphosphatemia, phosphorous elevated above baseline of 6 at most  recent of 7.9   Nephrology consulted, ongoing follow-up.  Did not require hemodialysis this hospital stay.  -Chronic peritoneal dialysis for the last 3.5 years  -Was ongoing peritoneal dialysis, as per nephrology. Patient expressed interest to switch to HD. Nephrology discussed with his primary nephrologist who agrees to transition to hemodialysis.  Underwent right tunneled IJ catheter placement by IR on 2/12/2025.  Underwent first session of hemodialysis on 2/13/2025 and tolerated it well.  -Continue midodrine.  -Arranged for outpatient hemodialysis through [facility] starting Monday afternoon  -Will defer removal of peritoneal dialysis catheter to nephrology.     Acute hypoxemic respiratory failure  Right pleural effusion  Status post thoracentesis 1/24/25, repeat 2/2/25  -Following surgery on admission by vascular surgery was intubated.  Recent pneumonia treated with 3 weeks of Ceftin prior to this admission.  No concern for pneumonia per intensivist service.  -Extubated 1/29/25.  -Right pleural effusion.  Status post thoracentesis1/24/25 with 1.5 L clear yellow fluid removed, likely transudative based on low cell count of 117, , and protein of 1.7. Possibly secondary to reduced peritoneal dialysis during hospitalization vs underlying severe HFrEF (30%).  -CT chest 2/1/25, see report, no clear pneumonia; large right pleural effusion noted.  -Wean down oxygen as able, encourage incentive spirometry.  -Antibiotics as above, discontinued by ID 2/2.  -ID consulted 2/1 as above, recommend monitoring patient off antibiotics  -S/p repeat Right thoracentesis on 2/2 with 2.3 L of rolan fluid removed; additional studies, cultures (negative to date), cytology (negative for malignancy)   -Currently stable on 1 L nasal cannula.  Denies any shortness of breath     Paroxysmal atrial fibrillation  Chronic anticoagulation prior to admission, warfarin - ON HOLD  Ischemic cardiomyopathy with HLD, CAD s/p multiple  stents, and severe EFrEF (30%)   Wide complex tachycardia 2/1/25  Assessment-postoperatively has been maintained on heparin drip.  Coumadin has been held during his hospital stay.  -Echo 1/31-EF 25 to 30%.  Severe global hypokinesis with abnormal septal motion consistent with conduction abnormality.  Severe inferior wall hypokinesis.  LVH.  RV mildly dilated and mildly reduced RV function.  Mild mitral regurgitation.  Mild to moderate mitral stenosis.  Moderate aortic stenosis.  Left ventricular fairly pressures elevated.  -Episode of wide-complex tachycardia noted by nursing staff, 15 beats, up on 2/1/2025.  -Continue inpatient telemetry.  -Consider follow-up cardiology consultation if recurrent wide-complex tachycardia in the setting of left ventricular dysfunction.  -Restarted prior to admission beta-blocker, Toprol XL, at adjusted dose, monitoring heart rate and blood pressure and consider titrating back up to PTA dose as indicated.  -Coumadin restarted on 2/14/2025.  Continue IV heparin until INR is therapeutic        Anemia of chronic disease  Baseline around 10.  Has been stable in the 8 range.  No signs of bleeding.  -Hemoglobin trended down to 6.7 on 2/8.  No signs of active bleed.  -s/p 1 unit PRBC  -Nephrology  started patient on EPO on 2/9 and to continue weekly.  -Continue to monitor hemoglobin daily,   -Will transfuse for hemoglobin less than 7.  1 unit packed RBCs given on 2/13/2025 for hemoglobin of 7.  Hemoglobin improved to 8.4     Hyperglycemia  Type 2 diabetes   hemoglobin A1c 5.7% October 24  On insulin prior to admission.  - increase lantus from 15U to 20U on 2/6/25 for optimal BG control.   -Patient started having hypoglycemia episodes after being started on hemodialysis on 2/13/2025.  Discontinued Lantus and carb count and currently only on sliding scale insulin.  Continue to reevaluate.  -Monitor for hypoglycemia.  - continue to monitor BG     Mixed anion gap acidosis  -Improved respiratory  "function, monitor.  Anion gap resolved 1/31.     Abdominal discomfort 1/30, resolved  Noted to have some right upper quadrant pain 1/30.  Now resolved.  LFTs normal.  Was on Zosyn for above surgical issues.   -Monitor abdominal exam.  -Antibiotics managed as noted above.     Moderate protein calorie nutrition  -Nutrition consulted.  -Speech and language therapy (SLP) consulted, diet per speech therapy.     Gout  -PTA allopurinol currently on hold, reassess daily.     History of renal cell carcinoma   -Status post right nephrectomy, monitor.     History of obstructive sleep apnea.  By report, intolerant of CPAP   -Monitor, follow-up with outpatient provider.     Weakness and deconditioning  -PT, OT, speech and language therapy (SLP) consulted.  -Increase activity as tolerated.     Disposition  Anticipated discharge timing see Disposition Plan below.  -Anticipated discharge to transitional care unit.  -Social work consulted for discharge planning.     Goals of care discussion:  Patient had mentioned tonursing staff about \"wanting to die\", aand also mentioned to me he is tired of living like this and he did not think he was going to make it out of the hospital. Palliative consulted. When palliative saw patient the next day, patient appeared to be in a better mood and stated he wanted to continue restorative cares. He has however mentioned to Nephrology that he will like to switch from peritoneal dialysis to HD as it will make him feel less fatigued , if however HD does not help, then he would consider comfort measures.         Clinically Significant Risk Factors     # Hyperkalemia: Highest K = 6.1 mmol/L in last 2 days, will monitor as appropriate  # Hyponatremia: Lowest Na = 131 mmol/L in last 2 days, will monitor as appropriate  # Hypochloremia: Lowest Cl = 94 mmol/L in last 2 days, will monitor as appropriate      # Hypoalbuminemia: Lowest albumin = 1.6 g/dL at 2/12/2025  7:26 AM, will monitor as appropriate  # " Coagulation Defect: INR = 1.37 (Ref range: 0.85 - 1.15) and/or PTT = 42 Seconds (Ref range: 22 - 38 Seconds), will monitor for bleeding    # Hypertension: Noted on problem list    # Chronic heart failure with reduced ejection fraction: last echo with EF <40%          # DMII: A1C = 6.6 % (Ref range: <5.7 %) within past 6 months    # Severe Malnutrition: based on nutrition assessment      # Financial/Environmental Concerns:            DVT Prophylaxis: Heparin iv  Code Status: Full Code  Medically Ready for Discharge: Anticipated in 2-4 Days      Please see A&P for additional details of medical decision making.  35 MINUTES SPENT BY ME on the date of service doing chart review, history, exam, documentation & further activities per the note.    Patient is for the most part medically stable for discharge.  However he needs to have his INR reach therapeutic range before discontinuing heparin drip.  Social work following for TCU discharge.  Outpatient dialysis has been arranged.    Ashley Roper MD, MD  [phone](p)

## 2025-02-14 NOTE — PROGRESS NOTES
Care Management Follow Up    Length of Stay (days): 24    Expected Discharge Date: 02/17/2025     Concerns to be Addressed: discharge planning     Patient plan of care discussed at interdisciplinary rounds: Yes    Anticipated Discharge Disposition: Skilled Nursing Facility              Anticipated Discharge Services:    Anticipated Discharge DME:      Patient/family educated on Medicare website which has current facility and service quality ratings:    Education Provided on the Discharge Plan:    Patient/Family in Agreement with the Plan: yes    Referrals Placed by CM/SW:    Private pay costs discussed: Not applicable    Discussed  Partnership in Safe Discharge Planning  document with patient/family: No     Handoff Completed: No, handoff not indicated or clinically appropriate    Additional Information:  Met with patient to inform him that he has Dialysis through [facility] starting Monday afternoon.  Informed patient that the SW is working on placement to TCU.  Pt requests not to be placed at [facility].  He states he will not go there.    Next Steps: Care management will follow patient for discharge. Await TCU placement and medical readiness.    Rozina Salazar RN

## 2025-02-15 LAB
ANION GAP SERPL CALCULATED.3IONS-SCNC: 10 MMOL/L (ref 7–15)
ANION GAP SERPL CALCULATED.3IONS-SCNC: 13 MMOL/L (ref 7–15)
BUN SERPL-MCNC: 20.8 MG/DL (ref 8–23)
BUN SERPL-MCNC: 54.6 MG/DL (ref 8–23)
CALCIUM SERPL-MCNC: 9.1 MG/DL (ref 8.8–10.4)
CALCIUM SERPL-MCNC: 9.6 MG/DL (ref 8.8–10.4)
CHLORIDE SERPL-SCNC: 90 MMOL/L (ref 98–107)
CHLORIDE SERPL-SCNC: 94 MMOL/L (ref 98–107)
CREAT SERPL-MCNC: 2.48 MG/DL (ref 0.67–1.17)
CREAT SERPL-MCNC: 5.23 MG/DL (ref 0.67–1.17)
EGFRCR SERPLBLD CKD-EPI 2021: 11 ML/MIN/1.73M2
EGFRCR SERPLBLD CKD-EPI 2021: 28 ML/MIN/1.73M2
ERYTHROCYTE [DISTWIDTH] IN BLOOD BY AUTOMATED COUNT: 20.3 % (ref 10–15)
GLUCOSE BLDC GLUCOMTR-MCNC: 104 MG/DL (ref 70–99)
GLUCOSE BLDC GLUCOMTR-MCNC: 109 MG/DL (ref 70–99)
GLUCOSE BLDC GLUCOMTR-MCNC: 111 MG/DL (ref 70–99)
GLUCOSE BLDC GLUCOMTR-MCNC: 84 MG/DL (ref 70–99)
GLUCOSE BLDC GLUCOMTR-MCNC: 94 MG/DL (ref 70–99)
GLUCOSE SERPL-MCNC: 116 MG/DL (ref 70–99)
GLUCOSE SERPL-MCNC: 126 MG/DL (ref 70–99)
HCO3 SERPL-SCNC: 25 MMOL/L (ref 22–29)
HCO3 SERPL-SCNC: 28 MMOL/L (ref 22–29)
HCT VFR BLD AUTO: 25.8 % (ref 40–53)
HGB BLD-MCNC: 8 G/DL (ref 13.3–17.7)
HGB BLD-MCNC: 8.3 G/DL (ref 13.3–17.7)
INR PPP: 1.47 (ref 0.85–1.15)
IRON BINDING CAPACITY (ROCHE): 129 UG/DL (ref 240–430)
IRON SATN MFR SERPL: 16 % (ref 15–46)
IRON SERPL-MCNC: 20 UG/DL (ref 61–157)
MAGNESIUM SERPL-MCNC: 1.8 MG/DL (ref 1.7–2.3)
MCH RBC QN AUTO: 29.7 PG (ref 26.5–33)
MCHC RBC AUTO-ENTMCNC: 32.2 G/DL (ref 31.5–36.5)
MCV RBC AUTO: 93 FL (ref 78–100)
PHOSPHATE SERPL-MCNC: 5.6 MG/DL (ref 2.5–4.5)
PLATELET # BLD AUTO: 388 10E3/UL (ref 150–450)
POTASSIUM SERPL-SCNC: 4.3 MMOL/L (ref 3.4–5.3)
POTASSIUM SERPL-SCNC: 6 MMOL/L (ref 3.4–5.3)
RBC # BLD AUTO: 2.79 10E6/UL (ref 4.4–5.9)
SODIUM SERPL-SCNC: 128 MMOL/L (ref 135–145)
SODIUM SERPL-SCNC: 132 MMOL/L (ref 135–145)
UFH PPP CHRO-ACNC: 0.18 IU/ML
UFH PPP CHRO-ACNC: 0.73 IU/ML
WBC # BLD AUTO: 10.5 10E3/UL (ref 4–11)

## 2025-02-15 PROCEDURE — 250N000013 HC RX MED GY IP 250 OP 250 PS 637: Performed by: INTERNAL MEDICINE

## 2025-02-15 PROCEDURE — 85520 HEPARIN ASSAY: CPT | Performed by: STUDENT IN AN ORGANIZED HEALTH CARE EDUCATION/TRAINING PROGRAM

## 2025-02-15 PROCEDURE — 85018 HEMOGLOBIN: CPT | Performed by: STUDENT IN AN ORGANIZED HEALTH CARE EDUCATION/TRAINING PROGRAM

## 2025-02-15 PROCEDURE — 250N000013 HC RX MED GY IP 250 OP 250 PS 637: Performed by: HOSPITALIST

## 2025-02-15 PROCEDURE — 83735 ASSAY OF MAGNESIUM: CPT | Performed by: INTERNAL MEDICINE

## 2025-02-15 PROCEDURE — 83550 IRON BINDING TEST: CPT | Performed by: INTERNAL MEDICINE

## 2025-02-15 PROCEDURE — 84100 ASSAY OF PHOSPHORUS: CPT | Performed by: INTERNAL MEDICINE

## 2025-02-15 PROCEDURE — 36415 COLL VENOUS BLD VENIPUNCTURE: CPT | Performed by: INTERNAL MEDICINE

## 2025-02-15 PROCEDURE — 90937 HEMODIALYSIS REPEATED EVAL: CPT

## 2025-02-15 PROCEDURE — 90935 HEMODIALYSIS ONE EVALUATION: CPT | Performed by: INTERNAL MEDICINE

## 2025-02-15 PROCEDURE — 83540 ASSAY OF IRON: CPT | Performed by: INTERNAL MEDICINE

## 2025-02-15 PROCEDURE — 80048 BASIC METABOLIC PNL TOTAL CA: CPT | Performed by: INTERNAL MEDICINE

## 2025-02-15 PROCEDURE — 36415 COLL VENOUS BLD VENIPUNCTURE: CPT | Performed by: STUDENT IN AN ORGANIZED HEALTH CARE EDUCATION/TRAINING PROGRAM

## 2025-02-15 PROCEDURE — 85610 PROTHROMBIN TIME: CPT | Performed by: HOSPITALIST

## 2025-02-15 PROCEDURE — 85014 HEMATOCRIT: CPT

## 2025-02-15 PROCEDURE — 634N000001 HC RX 634: Mod: JZ | Performed by: INTERNAL MEDICINE

## 2025-02-15 PROCEDURE — 250N000011 HC RX IP 250 OP 636: Performed by: INTERNAL MEDICINE

## 2025-02-15 PROCEDURE — 80048 BASIC METABOLIC PNL TOTAL CA: CPT

## 2025-02-15 PROCEDURE — 120N000013 HC R&B IMCU

## 2025-02-15 PROCEDURE — 82310 ASSAY OF CALCIUM: CPT | Performed by: INTERNAL MEDICINE

## 2025-02-15 PROCEDURE — 250N000013 HC RX MED GY IP 250 OP 250 PS 637: Performed by: PODIATRIST

## 2025-02-15 PROCEDURE — 99232 SBSQ HOSP IP/OBS MODERATE 35: CPT | Performed by: STUDENT IN AN ORGANIZED HEALTH CARE EDUCATION/TRAINING PROGRAM

## 2025-02-15 PROCEDURE — 250N000011 HC RX IP 250 OP 636: Performed by: PODIATRIST

## 2025-02-15 PROCEDURE — 250N000013 HC RX MED GY IP 250 OP 250 PS 637

## 2025-02-15 PROCEDURE — 36415 COLL VENOUS BLD VENIPUNCTURE: CPT

## 2025-02-15 PROCEDURE — 258N000003 HC RX IP 258 OP 636: Performed by: INTERNAL MEDICINE

## 2025-02-15 RX ORDER — WARFARIN SODIUM 2 MG/1
4 TABLET ORAL
Status: COMPLETED | OUTPATIENT
Start: 2025-02-15 | End: 2025-02-15

## 2025-02-15 RX ADMIN — Medication: at 09:28

## 2025-02-15 RX ADMIN — SEVELAMER CARBONATE 800 MG: 800 TABLET, FILM COATED ORAL at 08:00

## 2025-02-15 RX ADMIN — Medication 12.5 MG: at 13:02

## 2025-02-15 RX ADMIN — MIDODRINE HYDROCHLORIDE 10 MG: 2.5 TABLET ORAL at 13:01

## 2025-02-15 RX ADMIN — PANTOPRAZOLE SODIUM 40 MG: 40 TABLET, DELAYED RELEASE ORAL at 13:02

## 2025-02-15 RX ADMIN — ATORVASTATIN CALCIUM 40 MG: 40 TABLET, FILM COATED ORAL at 23:38

## 2025-02-15 RX ADMIN — CLOPIDOGREL BISULFATE 75 MG: 75 TABLET ORAL at 23:38

## 2025-02-15 RX ADMIN — WARFARIN SODIUM 4 MG: 2 TABLET ORAL at 18:46

## 2025-02-15 RX ADMIN — SODIUM CHLORIDE 200 ML: 0.9 INJECTION, SOLUTION INTRAVENOUS at 09:25

## 2025-02-15 RX ADMIN — ACETAMINOPHEN 975 MG: 325 TABLET, FILM COATED ORAL at 14:13

## 2025-02-15 RX ADMIN — SENNOSIDES AND DOCUSATE SODIUM 1 TABLET: 50; 8.6 TABLET ORAL at 13:02

## 2025-02-15 RX ADMIN — SEVELAMER CARBONATE 800 MG: 800 TABLET, FILM COATED ORAL at 13:03

## 2025-02-15 RX ADMIN — Medication 5 MG: at 23:38

## 2025-02-15 RX ADMIN — ACETAMINOPHEN 975 MG: 325 TABLET, FILM COATED ORAL at 23:37

## 2025-02-15 RX ADMIN — SEVELAMER CARBONATE 800 MG: 800 TABLET, FILM COATED ORAL at 18:46

## 2025-02-15 RX ADMIN — ACETAMINOPHEN 975 MG: 325 TABLET, FILM COATED ORAL at 05:40

## 2025-02-15 RX ADMIN — Medication 1 CAPSULE: at 13:01

## 2025-02-15 RX ADMIN — EPOETIN ALFA-EPBX 10000 UNITS: 10000 INJECTION, SOLUTION INTRAVENOUS; SUBCUTANEOUS at 09:28

## 2025-02-15 RX ADMIN — SODIUM CHLORIDE 250 ML: 9 INJECTION, SOLUTION INTRAVENOUS at 09:26

## 2025-02-15 RX ADMIN — CALCITRIOL CAPSULES 0.25 MCG 0.25 MCG: 0.25 CAPSULE ORAL at 23:38

## 2025-02-15 RX ADMIN — HEPARIN SODIUM 1600 UNITS/HR: 10000 INJECTION, SOLUTION INTRAVENOUS at 17:21

## 2025-02-15 RX ADMIN — HEPARIN SODIUM 1600 UNITS: 1000 INJECTION INTRAVENOUS; SUBCUTANEOUS at 09:27

## 2025-02-15 RX ADMIN — HEPARIN SODIUM 1750 UNITS/HR: 10000 INJECTION, SOLUTION INTRAVENOUS at 00:23

## 2025-02-15 RX ADMIN — HEPARIN SODIUM 1600 UNITS: 1000 INJECTION INTRAVENOUS; SUBCUTANEOUS at 09:28

## 2025-02-15 ASSESSMENT — ACTIVITIES OF DAILY LIVING (ADL)
ADLS_ACUITY_SCORE: 71

## 2025-02-15 NOTE — PROVIDER NOTIFICATION
MD Notification    Notified Person: MD    Notified Person Name: Rosa Barrera    Notification Date/Time: 2/14/25 2330    Notification Interaction: [employer] Messaging    Purpose of Notification: Hi! Pt in 2206 still has tele on, but I do not see any orders for it. Just wanted to double check that it was ok to take off. Thanks!      Orders Received: Can take off tele

## 2025-02-15 NOTE — PLAN OF CARE
[id number]    2/14/25    Orientation: A/Ox4    Vitals/Tele: VSS, except tachycardic     IV Access/drains:  PIV infusing heparin @ 1750 units/hr. Xa recheck in AM. Other PIV SL. Dialysis port.    Diet: Reg, 3 g potassium     Mobility: Ax2 oscar steady    GI/: uses urinal at beside, incontinent of bowel     Wound/Skin:  RLE incision sites CDI, R foot amputation no drainage,  ace wrap and gauze in place, black heel/malleolus wounds. R groin site w/ hematoma - MD evaluated this AM. Peritoneal site CDI.     Consults: None     Discharge Plan: TBD      See Flow sheets for assessment

## 2025-02-15 NOTE — PLAN OF CARE
Goal Outcome Evaluation:                      Date & Time: 2/14/25 [id number]  Surgery/POD#: 1/21 - came in with sepsis. 1/27 - pt had vascular issues, got R femoral to popliteal artery bypass and endarterectomy with graft and saphenous vein harvest. 2/11 - R foot transmetatarsal amputation  Behavior & Aggression: green  Fall Risk: Yes  Orientation: A&Ox4  ABNL VS/O2: VSS on RA ex. Tachycardia and slight HTN  ABNL Labs: See labs. INR 1.37. AM heparin, Hgb, nag and phos redraws. Heparin xa: 1.47, heparin drip paused for an hour and to restart at new level.   Pain Management: Denies pain, keep amputated foot elevated  Bowel/Bladder: Cont. Bladder, uses urinal. Incont. Bowel, BM on shift  Drains: Peritoneal dialysis catheter, borrego, R/L PIVs, CVC double lumen   Wounds/incisions: Peritoneal dialysis catheter, groin sites (with known hematoma), R leg incisions and R metatarsals amputation  Diet: Regular diet with low potassium  Activity Level: T/R q2  Tests/Procedures: N/A  Anticipated  DC Date: Pending, possibly Monday following TCU placement  Significant Information: Awaiting steady INR levels to switch from heparin drip to Lovenox. Awaiting TCU placement. Heparin paused for 1 hour starting at 7:00 AM.

## 2025-02-15 NOTE — PROGRESS NOTES
Lab was contacted for second time about overdue labs, they reported they would get someone up as soon as they were able

## 2025-02-15 NOTE — PROGRESS NOTES
DIALYSIS PROCEDURE NOTE  Hepatitis status of previous patient on machine log was checked and verified ok to use with this patients hepatitis status.  Patient dialyzed for 3.5 hrs. on a K2 bath with a net fluid removal of  1.5L.  A BFR of 400 ml/min was obtained via a right CVC.     The treatment plan was discussed with Dr. Shay during the treatment.    Total heparin received during the treatment: 0 units.     Line flushed, clamped and capped with heparin 1:1000 1.6 mL (1600 units) per lumen   Floor RN to change PD catheter dressing following dialysis run on 2/15 per MD Shay verbal order with read back.   Meds  given: epo 10,000 units   Complications: none       Person educated: patient. Knowledge base minimal. Barriers to learning: none. Educated on procedure via verbal mode. The patient verbalized understanding. Pt prefers verbal education style.      ICEBOAT? Timeout performed pre-treatment  I: Patient was identified using 2 identifiers  C:  Consent Signed Yes  E: Equipment preventative maintenance is current and dialysis delivery system OK to use  B: Hepatitis B Surface Antigen: Negative; Draw Date: 1/22/25      Hepatitis B Surface Antibody: Susceptible; Draw Date: 1/22/25  O: Dialysis orders present and complete prior to treatment  A: Vascular access verified and assessed prior to treatment  T: Treatment was performed at a clinically appropriate time  ?: Patient was allowed to ask questions and address concerns prior to treatment  See Adult Hemodialysis flowsheet in [employer] for further details and post assessment.  Machine water alarm in place and functioning. Transducer pods intact and checked every 15min.   Pt returned via bed.  Chlorine/Chloramine water system checked every 4 hours.  Outpatient Dialysis at [facility]     Patient repositioned every 2 hours during the treatment.  Post treatment report given to ISRRAEL Fuller RN regarding 1.5L of fluid removed, last BP of 111/85, and patient pain rating of 0/10.

## 2025-02-15 NOTE — PROGRESS NOTES
Vascular Surgery Progress Note    Patient seen this afternoon after diaylsis resting comfortably in bed, no acute distress. Denies lightheadness, dizziness, chest pain. Reports some shortness of breath which is unchanged from previous days, on room air.     Exam:   Afebrile, HR 100s-110s, BP 110s-120s systolic  Awake, alert, nad  NLB on RA   Regular rate per doppler signals  R groin with firm hematoma, eschar over R groin incision, mild erythema, hematoma extends throughout thigh.   Strong biphasic doppler signal in R thigh   R foot wrapped  Strong monophasic DP and PT doppler signals with forward diastolic flow.     Labs:   Cr 5.23 from 4.28   Na 128  K 6 - pre dialysis   Hgb 8 @ 9am from 8.4 yesterday         INR 1.47    A/P:   64 year old male who is s/p R fem-BK pop bypass with PTFE and proximal short GSV interposition graft on 1/27/25. Hospital course c/b worsening R pleural effusion of unclear etiology despite thoracentesis, most recent thoracentesis on 2/2, now recovering appropriately, bypass graft remains open at this time. Has known R groin hematoma, my first time seeing pt however is stable per discussion w/ Dr. Hein. Hospitalist w/ concern for possible increase in size of hematoma, will trend hgb out of abundance of caution in setting of pt on heparin and bridging back to warfarin new since yesterday.      PLAN:  -oob, physical therapy  - hgb 8 today predialysis - will recheck hgb, expect some change given concentration changes w/ dialysis - will trend x2 to assess stability however per discussion with Dr. Hein and my signout pt has had large stable R groin hematoma for over a week now. Will assess again in am.   - Page vascular surgery stat if any concern for decompensation, major change in hematoma.   -plavix, heparin gtt - with warfarin restarted, bridging. Continue for now  -appreciate all teams involved    Kaiser Gamble MD  Vascular Surgery PGY4  To reach vascular surgery [facility] team on call,  please go to [facility] and from the drop down find the following:   VASCULAR SURGERY/[facility]  Then page the person listed to the right of day/night call. Can click the pager to page.

## 2025-02-15 NOTE — PROGRESS NOTES
[facility]    Medicine Progress Note - Hospitalist Service    Date of Admission:  1/21/2025    Assessment & Plan   Arnulfo Pritchett is a 64 year old male with past medical history of severe heart failure (EF 30%), ESRD (on peritoneal dialysis), chronic paroxysmal AF on warfarin, LLE PAD s/p endarterectomy, RCC s/p nephrectomy admitted on 1/21/2025 for septic shock secondary to right leg PAD with worsening right heel necrotic ulcer. The patient was seen in ED at outside hospital on 1/3/25 and diagnosed with pneumonia, later completing course of Cefdinir. He then presented on 1/21/25 to outside hospital for right leg pain and found to have right leg ulcer and transferred to [facility] for vascular surgery evaluation. Now s/p right common femoral BK popliteal/tibial endarterectomy and right common femoral to below-knee popliteal/tibial PTFE bypass performed on 1/27/25. The patient was admitted to the ICU for requirement of mechanical ventilation and pressor support following surgery for multifactorial shock.       Hospitalist service consulted 1/31/2025 for transfer out of ICU and to assist with medical management along with vascular surgery, podiatry and Infectious disease.     Hx PAD of LLE s/p endarterectomy and fem-tibioperoneal trunk bypass on 10/25/24  Hx RLE arterial occlusion s/p SFA angioplasty and stent 5/2024  PAD RLE, right heel gangrene, occluded SFA, no evidence of osteomyelitis  s/p right common femoral and popliteal/tibial endarterectomy, right femoral to popliteal/tibial PTFE bypass 1/27/25   -Found to have critical limb ischemia on admission on January 21, for details see admission note.   -Distributive shock following surgery followed by the ICU service until 1/31.  -Followed by vascular surgery, podiatry, wound care, and ID.  -Coumadin has been held since admission, was was started on IV heparin drip since admission.  Restarted warfarin on 2/14/2025 after discussion with  vascular surgery.  Continue heparin drip until INR is therapeutic.  -Has been maintained on statin and Plavix 75 mg daily during this hospital stay.  -Recent IV piperacillin/tazobactam (Zosyn), discontinued 2/2 per ID after 12 days of Zosyn; follow-up cultures, monitor off antibiotics.  -Wound care per surgery and WOC.  -pain control, see hospital orders.  -worsening ischemic changes in 2nd-5th toes of right foot.  Podiatry and vascular surgery following.  -Underwent transmetatarsal amputation on 2/11/2025.  Tolerated procedure well.  Intraoperatively no signs of infection.  No further surgery per podiatry.  -Leukocytosis improved postsurgery.  Hemoglobin trended down to 7 on 2/13/2025 requiring transfusion, Hgb stable since then.    Multifactorial shock, resolved  ICU admission, weaned off vasopressors 1/30.  -Transitioned to midodrine with increase in prior to admission dose on1/31.  -Monitor vitals     End-stage renal disease (ESRD), on peritoneal dialysis  Hx of secondary hyperparathyroidism and hyperphosphatemia, phosphorous elevated above baseline of 6 at most recent of 7.9   Nephrology consulted, ongoing follow-up.  Did not require hemodialysis this hospital stay.  -Chronic peritoneal dialysis for the last 3.5 years  -Was ongoing peritoneal dialysis, as per nephrology. Patient expressed interest to switch to HD. Nephrology discussed with his primary nephrologist who agrees to transition to hemodialysis.  Underwent right tunneled IJ catheter placement by IR on 2/12/2025.  Underwent first session of hemodialysis on 2/13/2025 and tolerated it well.  -Continue midodrine.  -[facility] afternoon  -Will defer removal of peritoneal dialysis catheter to nephrology.     Acute hypoxemic respiratory failure, resolved  Right pleural effusion  Status post thoracentesis 1/24/25, repeat 2/2/25  -Following surgery on admission by vascular surgery was  intubated.  Recent pneumonia treated with 3 weeks of Ceftin prior to this admission.  No concern for pneumonia per intensivist service.  -Extubated 1/29/25.  -Right pleural effusion.  Status post thoracentesis1/24/25 with 1.5 L clear yellow fluid removed, likely transudative based on low cell count of 117, , and protein of 1.7. Possibly secondary to reduced peritoneal dialysis during hospitalization vs underlying severe HFrEF (30%).  -CT chest 2/1/25, see report, no clear pneumonia; large right pleural effusion noted.  -Now off oxygen. Encourage incentive spirometry.  -Antibiotics as above, discontinued by ID 2/2  -ID consulted 2/1 as above, recommend monitoring patient off antibiotics  -S/p repeat Right thoracentesis on 2/2 with 2.3 L of rolan fluid removed; additional studies, cultures (negative to date), cytology (negative for malignancy)      Paroxysmal atrial fibrillation  Chronic anticoagulation prior to admission, warfarin - ON HOLD  Ischemic cardiomyopathy with HLD, CAD s/p multiple stents, and severe EFrEF (30%)   Wide complex tachycardia 2/1/25  Assessment-postoperatively has been maintained on heparin drip.  Coumadin has been held during his hospital stay.  -Echo 1/31-EF 25 to 30%.  Severe global hypokinesis with abnormal septal motion consistent with conduction abnormality.  Severe inferior wall hypokinesis.  LVH.  RV mildly dilated and mildly reduced RV function.  Mild mitral regurgitation.  Mild to moderate mitral stenosis.  Moderate aortic stenosis.  Left ventricular fairly pressures elevated.  -Episode of wide-complex tachycardia noted by nursing staff, 15 beats, up on 2/1/2025.  -Continue inpatient telemetry.  -Consider follow-up cardiology consultation if recurrent wide-complex tachycardia in the setting of left ventricular dysfunction.  -Restarted prior to admission beta-blocker, Toprol XL, at adjusted dose, monitoring heart rate and blood pressure and consider titrating back up to PTA dose  as indicated.  -Coumadin restarted on 2/14/2025.  Continue IV heparin until INR is therapeutic     Anemia of chronic disease  Baseline around 10.  Has been stable in the 8 range.  No signs of bleeding.  -Hemoglobin trended down to 6.7 on 2/8.  No signs of active bleed.  -s/p 1 unit PRBC  -Nephrology  started patient on EPO on 2/9 and to continue weekly.  -Continue to monitor hemoglobin daily,   -Will transfuse for hemoglobin less than 7.  1 unit packed RBCs given on 2/13/2025 for hemoglobin of 7.  Hemoglobin improved to 8.4     Hyperglycemia  Type 2 diabetes   hemoglobin A1c 5.7% October 24  PTA regimen: Lantus 35 units at bedtime  Insulin requirements have been decreasing and he started having hypoglycemia episodes after being started on HD on 2/13/2025  Discontinue Lantus and carb coverage  Continue sliding scale insulin. Discharge insulin dosing dependent on requirements from sliding scale.  Continue to monitor FSBS     Mixed anion gap acidosis, resolved  -Improved respiratory function, monitor.  Anion gap resolved 1/31.     Abdominal discomfort 1/30, resolved  Noted to have some right upper quadrant pain 1/30.  Now resolved.  LFTs normal.  Was on Zosyn for above surgical issues.   -Monitor abdominal exam.  -Antibiotics managed as noted above.     Moderate protein calorie nutrition  -Nutrition consulted.  -Speech and language therapy (SLP) consulted, diet per speech therapy.     Gout  -PTA allopurinol currently on hold, reassess daily.     History of renal cell carcinoma   -Status post right nephrectomy, monitor.     History of obstructive sleep apnea.  By report, intolerant of CPAP   -Monitor, follow-up with outpatient provider.     Weakness and deconditioning  -PT, OT, speech and language therapy (SLP) consulted.  -Increase activity as tolerated.     Disposition  Anticipated discharge timing see Disposition Plan below.  -Anticipated discharge to transitional care unit.  -Social work consulted for discharge  "planning.     Goals of care discussion:  Patient had mentioned tonursing staff about \"wanting to die\", aand also mentioned to me he is tired of living like this and he did not think he was going to make it out of the hospital. Palliative consulted. When palliative saw patient the next day, patient appeared to be in a better mood and stated he wanted to continue restorative cares. He has however mentioned to Nephrology that he will like to switch from peritoneal dialysis to HD as it will make him feel less fatigued , if however HD does not help, then he would consider comfort measures.                 Diet: Combination Diet Regular Diet; 3 gm K Diet (low potassium)  Snacks/Supplements Adult: Ensure Enlive; Between Meals    DVT Prophylaxis: Warfarin with heparin gtt bridge  Rosario Catheter: Not present  Lines: PRESENT      CVC Double Lumen Right Internal jugular Non - valved (open ended);Tunneled-Site Assessment: Ecchymotic;Tender;WDL except      Cardiac Monitoring: None  Code Status: Full Code      Clinically Significant Risk Factors        # Hyperkalemia: Highest K = 6 mmol/L in last 2 days, will monitor as appropriate  # Hyponatremia: Lowest Na = 128 mmol/L in last 2 days, will monitor as appropriate  # Hypochloremia: Lowest Cl = 90 mmol/L in last 2 days, will monitor as appropriate      # Hypoalbuminemia: Lowest albumin = 1.6 g/dL at 2/12/2025  7:26 AM, will monitor as appropriate     # Hypertension: Noted on problem list    # Chronic heart failure with reduced ejection fraction: last echo with EF <40%          # DMII: A1C = 6.6 % (Ref range: <5.7 %) within past 6 months    # Severe Malnutrition: based on nutrition assessment      # Financial/Environmental Concerns:           Social Drivers of Health    Tobacco Use: Medium Risk (2/11/2025)    Patient History     Smoking Tobacco Use: Former     Smokeless Tobacco Use: Never   Alcohol Use: Unknown (11/28/2018)    Received from [facility] and [facility] " [facility], [facility] and [facility]    AUDIT-C     Frequency of Alcohol Consumption: Monthly or less     Frequency of Binge Drinking: Never   Transportation Needs: High Risk (1/21/2025)    Transportation Needs     Within the past 12 months, has lack of transportation kept you from medical appointments, getting your medicines, non-medical meetings or appointments, work, or from getting things that you need?: Yes   Physical Activity: Unknown (10/8/2024)    Exercise Vital Sign     Days of Exercise per Week: 0 days   Social Connections: Unknown (10/8/2024)    Social Connection and Isolation Panel [NHANES]     Frequency of Social Gatherings with Friends and Family: Patient declined          Disposition Plan     Medically Ready for Discharge: Anticipated in 2-4 Days pending completion of bridge with INR in goal range             Viraj Chavez MD  Hospitalist Service  [facility]  Securely message with [employer] (more info)  Text page via [facility] Paging/Directory   ______________________________________________________________________    Interval History   NAEO. NNR. Patient had HD run today and has HD line in place which he tolerated but is just mildly fatigued afterwards. Patient does still have hematoma and tenderness around R groin incision site. He says this is not much different from yesterday. Vascular surgery plans to come reevaluate him later today.  Otherwise, patient has no complaints or concerns.  We discussed that we are still waiting for his INR to get to goal so that he can be discharged from the hospital.  He will need to go to TCU from the hospital and will need placement.      Physical Exam   Vital Signs: Temp: 97.6  F (36.4  C) Temp src: Oral BP: 115/79 Pulse: 103   Resp: 20 SpO2: 99 % O2 Device: None (Room air) Oxygen Delivery: 2 LPM  Weight: 179 lbs 3.74 oz  Constitutional: Awake, alert, cooperative, no apparent distress.    Pulmonary: No increased work of  breathing, good air exchange, clear to auscultation bilaterally, no crackles or wheezing.  Cardiovascular: Regular rate and rhythm, normal S1 and S2, no S3 or S4. No murmurs, rubs, or gallops noted.  GI: Normal bowel sounds, soft, non-distended, non-tender.  No guarding or rebound.  Skin/Integumentary: R groin surgical site is slightly erythematous and indurated with mild tenderness, per report and prior progress note stable from yesterday.  Extremities: S/p R transmetatarsal amputation.  Neuro: A&Ox4. Conversant. Responds appropriately in conversation. Moves all extremities with no focal deficit identified.        Medical Decision Making       48 MINUTES SPENT BY ME on the date of service doing chart review, history, exam, documentation & further activities per the note.      Data   ------------------------- PAST 24 HR DATA REVIEWED -----------------------------------------------    I have personally reviewed the following data over the past 24 hrs:    N/A  \   8.0 (L)   / N/A     128 (L) 90 (L) 54.6 (H) /  84   6.0 (H) 25 5.23 (H) \     INR:  1.47 (H) PTT:  N/A   D-dimer:  N/A Fibrinogen:  N/A       Imaging results reviewed over the past 24 hrs:   No results found for this or any previous visit (from the past 24 hours).

## 2025-02-15 NOTE — PROVIDER NOTIFICATION
MD Notification    Notified Person: MD    Notified Person Name: Dr. Roper    Notification Date/Time: 02/14/25  831AM    Notification Interaction: Vocera    Purpose of Notification: Pt glucose was 78 this AM, would you still like the insulin dosing units per amount of carbs to be done with meals? Okay to do CVC dressing changes weekly?    Orders Received: Holding lantus and only doing sliding scale, hold carb count. Okay to do dressing changes to CVC weekly.    Comments:

## 2025-02-15 NOTE — PROGRESS NOTES
"Seen on dialysis. Mr. Pritchett is a 65 yo with hx of CAD, HTN, afib and HFrEF who recently converted from PD to HD. Slightly tachycardic today on HD but no other issues. Blood pressures are 105-120 systolic. He has no chest pain or pressure. No nausea. No ab pain. PD catheter remains in place at this time. Potassium this am was 6.     Vital signs:  Temp: 97.8  F (36.6  C) Temp src: Oral BP: 110/80 Pulse: 107   Resp: 18 SpO2: 100 % O2 Device: Nasal cannula Oxygen Delivery: 2 LPM   Weight: 81.3 kg (179 lb 3.7 oz)  Estimated body mass index is 23.65 kg/m  as calculated from the following:    Height as of an earlier encounter on 1/21/25: 1.854 m (6' 1\").    Weight as of this encounter: 81.3 kg (179 lb 3.7 oz).      Gen: NAD. Sleeping on dilaysis  Lungs: Clear anterior  Card: tachycardic, irregular  Abd: soft, non tender. PD catheter in place LL ab. Dressing is dry and intact  Ext: No lower ext edema R heel is wrapped. Noted to have r heel gangrene R groin incision intact  Access: R tunneled catheter is tender to palpation but no ozzing or bleeding    Last Comprehensive Metabolic Panel:  Lab Results   Component Value Date     (L) 02/15/2025    POTASSIUM 6.0 (H) 02/15/2025    CHLORIDE 90 (L) 02/15/2025    CO2 25 02/15/2025    ANIONGAP 13 02/15/2025     (H) 02/15/2025    BUN 54.6 (H) 02/15/2025    CR 5.23 (H) 02/15/2025    GFRESTIMATED 11 (L) 02/15/2025    TERENCE 9.6 02/15/2025       Hemoglobin   Date Value Ref Range Status   02/15/2025 8.0 (L) 13.3 - 17.7 g/dL Final   ]      ESKD: followed by Dr. May with [facility] nephrology. Had been on PD for 3.5 years. Now on hemodialysis. He is set up for [facility] starting Monday for a MWF He is waiting on TCU placement. Most TCU's do dialysis on a TRS schedule. I will leave him on TRS now but may need to transition to MWF depending upon discharge planning.     2. Hyperkalemia: as a pd patient he was able to be more liberal with his diet. He is currently " on a low potassium diet but this will need to be monitored closely as he adjustes to his new dialysis modality    3. Gangrene R foot s/p R fem pop bypass this hospitalization and R TMA 2/11/2025 Please see vascular notes    4. Anemia: Hgb is 8. He was 10.4 on 1.24.2025 but has steadily drifted down in hospital in the setting of phelbotomy, surgery, illness. Will check iron studies    5. Bone Mineral: phos 5.6. Calcium 9.     Trudy Shay MD MS

## 2025-02-15 NOTE — PLAN OF CARE
Date & Time: 02/14/25 [id number]  Behavior & Aggression: Green   Fall Risk: Yes   Orientation: A/Ox4  ABNL VS/O2:VSS on RA  ABNL Labs: Hgb 8.4  Pain Management: C/O pain in R leg, controlled with scheduled tylenol  Bowel/Bladder: Voiding in urinal, some urine occasions were unmeasured. Passing gas, BM x2 this shift.  IV/Drains: PIV infusing heparin @ 1750 units/hr. Xa recheck in AM. Other PIV SL. Dialysis port.  Skin: RLE incision sites CDI, pulses were doppled. R foot amputation no drainage,  ace wrap and gauze in place, black heel/malleolus wounds. R groin site w/ hematoma - MD evaluated this AM. Peritoneal site CDI. Doppler pulses intact.  Diet: Regular diet and low K+, tolerating well, denies nausea  Activity Level: Not OOB, up w/ oscar steady, sat at edge of bed this shift with OT. A2 for cares,T&R q2hrs completed.  Tests/Procedures: N/A   Anticipated  DC Date: TBD.  Significant information:  On continuous Hep gtt. Dialysis port CDI.

## 2025-02-16 ENCOUNTER — APPOINTMENT (OUTPATIENT)
Dept: GENERAL RADIOLOGY | Facility: CLINIC | Age: 65
DRG: 853 | End: 2025-02-16
Attending: STUDENT IN AN ORGANIZED HEALTH CARE EDUCATION/TRAINING PROGRAM
Payer: MEDICARE

## 2025-02-16 LAB
ANION GAP SERPL CALCULATED.3IONS-SCNC: 11 MMOL/L (ref 7–15)
BACTERIA TISS BX CULT: NO GROWTH
BUN SERPL-MCNC: 28.5 MG/DL (ref 8–23)
CALCIUM SERPL-MCNC: 9.6 MG/DL (ref 8.8–10.4)
CHLORIDE SERPL-SCNC: 93 MMOL/L (ref 98–107)
CREAT SERPL-MCNC: 3.03 MG/DL (ref 0.67–1.17)
EGFRCR SERPLBLD CKD-EPI 2021: 22 ML/MIN/1.73M2
ERYTHROCYTE [DISTWIDTH] IN BLOOD BY AUTOMATED COUNT: 20.4 % (ref 10–15)
GLUCOSE BLDC GLUCOMTR-MCNC: 107 MG/DL (ref 70–99)
GLUCOSE BLDC GLUCOMTR-MCNC: 110 MG/DL (ref 70–99)
GLUCOSE BLDC GLUCOMTR-MCNC: 114 MG/DL (ref 70–99)
GLUCOSE BLDC GLUCOMTR-MCNC: 118 MG/DL (ref 70–99)
GLUCOSE BLDC GLUCOMTR-MCNC: 139 MG/DL (ref 70–99)
GLUCOSE BLDC GLUCOMTR-MCNC: 162 MG/DL (ref 70–99)
GLUCOSE BLDC GLUCOMTR-MCNC: 163 MG/DL (ref 70–99)
GLUCOSE SERPL-MCNC: 108 MG/DL (ref 70–99)
HCO3 SERPL-SCNC: 26 MMOL/L (ref 22–29)
HCT VFR BLD AUTO: 24.7 % (ref 40–53)
HGB BLD-MCNC: 7.9 G/DL (ref 13.3–17.7)
INR PPP: 2.06 (ref 0.85–1.15)
LACTATE SERPL-SCNC: 1.1 MMOL/L (ref 0.7–2)
MAGNESIUM SERPL-MCNC: 1.8 MG/DL (ref 1.7–2.3)
MAGNESIUM SERPL-MCNC: 1.9 MG/DL (ref 1.7–2.3)
MCH RBC QN AUTO: 29.6 PG (ref 26.5–33)
MCHC RBC AUTO-ENTMCNC: 32 G/DL (ref 31.5–36.5)
MCV RBC AUTO: 93 FL (ref 78–100)
NT-PROBNP SERPL-MCNC: ABNORMAL PG/ML (ref 0–900)
PATH REPORT.COMMENTS IMP SPEC: NORMAL
PATH REPORT.COMMENTS IMP SPEC: NORMAL
PATH REPORT.FINAL DX SPEC: NORMAL
PATH REPORT.GROSS SPEC: NORMAL
PATH REPORT.MICROSCOPIC SPEC OTHER STN: NORMAL
PATH REPORT.RELEVANT HX SPEC: NORMAL
PHOSPHATE SERPL-MCNC: 4.1 MG/DL (ref 2.5–4.5)
PHOTO IMAGE: NORMAL
PLATELET # BLD AUTO: 422 10E3/UL (ref 150–450)
POTASSIUM SERPL-SCNC: 4.6 MMOL/L (ref 3.4–5.3)
RBC # BLD AUTO: 2.67 10E6/UL (ref 4.4–5.9)
SODIUM SERPL-SCNC: 130 MMOL/L (ref 135–145)
TROPONIN T SERPL HS-MCNC: 513 NG/L
TROPONIN T SERPL HS-MCNC: 519 NG/L
UFH PPP CHRO-ACNC: 0.28 IU/ML
UFH PPP CHRO-ACNC: <0.1 IU/ML
WBC # BLD AUTO: 11.4 10E3/UL (ref 4–11)

## 2025-02-16 PROCEDURE — 120N000013 HC R&B IMCU

## 2025-02-16 PROCEDURE — 84100 ASSAY OF PHOSPHORUS: CPT | Performed by: INTERNAL MEDICINE

## 2025-02-16 PROCEDURE — 250N000013 HC RX MED GY IP 250 OP 250 PS 637: Performed by: INTERNAL MEDICINE

## 2025-02-16 PROCEDURE — 83735 ASSAY OF MAGNESIUM: CPT | Performed by: STUDENT IN AN ORGANIZED HEALTH CARE EDUCATION/TRAINING PROGRAM

## 2025-02-16 PROCEDURE — 83735 ASSAY OF MAGNESIUM: CPT | Performed by: INTERNAL MEDICINE

## 2025-02-16 PROCEDURE — 85520 HEPARIN ASSAY: CPT | Performed by: SURGERY

## 2025-02-16 PROCEDURE — 250N000011 HC RX IP 250 OP 636: Performed by: INTERNAL MEDICINE

## 2025-02-16 PROCEDURE — 80048 BASIC METABOLIC PNL TOTAL CA: CPT | Performed by: INTERNAL MEDICINE

## 2025-02-16 PROCEDURE — 71045 X-RAY EXAM CHEST 1 VIEW: CPT

## 2025-02-16 PROCEDURE — 36415 COLL VENOUS BLD VENIPUNCTURE: CPT | Performed by: STUDENT IN AN ORGANIZED HEALTH CARE EDUCATION/TRAINING PROGRAM

## 2025-02-16 PROCEDURE — 250N000013 HC RX MED GY IP 250 OP 250 PS 637

## 2025-02-16 PROCEDURE — 99222 1ST HOSP IP/OBS MODERATE 55: CPT | Performed by: INTERNAL MEDICINE

## 2025-02-16 PROCEDURE — 93005 ELECTROCARDIOGRAM TRACING: CPT

## 2025-02-16 PROCEDURE — 84484 ASSAY OF TROPONIN QUANT: CPT | Performed by: STUDENT IN AN ORGANIZED HEALTH CARE EDUCATION/TRAINING PROGRAM

## 2025-02-16 PROCEDURE — 82310 ASSAY OF CALCIUM: CPT | Performed by: INTERNAL MEDICINE

## 2025-02-16 PROCEDURE — 84295 ASSAY OF SERUM SODIUM: CPT | Performed by: INTERNAL MEDICINE

## 2025-02-16 PROCEDURE — 250N000011 HC RX IP 250 OP 636: Performed by: PODIATRIST

## 2025-02-16 PROCEDURE — 250N000013 HC RX MED GY IP 250 OP 250 PS 637: Performed by: PODIATRIST

## 2025-02-16 PROCEDURE — 85610 PROTHROMBIN TIME: CPT | Performed by: HOSPITALIST

## 2025-02-16 PROCEDURE — 83605 ASSAY OF LACTIC ACID: CPT | Performed by: SURGERY

## 2025-02-16 PROCEDURE — 88311 DECALCIFY TISSUE: CPT | Mod: 26

## 2025-02-16 PROCEDURE — 250N000013 HC RX MED GY IP 250 OP 250 PS 637: Performed by: STUDENT IN AN ORGANIZED HEALTH CARE EDUCATION/TRAINING PROGRAM

## 2025-02-16 PROCEDURE — 83880 ASSAY OF NATRIURETIC PEPTIDE: CPT | Performed by: STUDENT IN AN ORGANIZED HEALTH CARE EDUCATION/TRAINING PROGRAM

## 2025-02-16 PROCEDURE — 85027 COMPLETE CBC AUTOMATED: CPT | Performed by: STUDENT IN AN ORGANIZED HEALTH CARE EDUCATION/TRAINING PROGRAM

## 2025-02-16 PROCEDURE — 93010 ELECTROCARDIOGRAM REPORT: CPT | Performed by: INTERNAL MEDICINE

## 2025-02-16 PROCEDURE — 88307 TISSUE EXAM BY PATHOLOGIST: CPT | Mod: 26

## 2025-02-16 PROCEDURE — 99233 SBSQ HOSP IP/OBS HIGH 50: CPT | Performed by: STUDENT IN AN ORGANIZED HEALTH CARE EDUCATION/TRAINING PROGRAM

## 2025-02-16 PROCEDURE — 250N000013 HC RX MED GY IP 250 OP 250 PS 637: Performed by: HOSPITALIST

## 2025-02-16 PROCEDURE — 85520 HEPARIN ASSAY: CPT | Performed by: STUDENT IN AN ORGANIZED HEALTH CARE EDUCATION/TRAINING PROGRAM

## 2025-02-16 RX ORDER — WARFARIN SODIUM 3 MG/1
3 TABLET ORAL
Status: COMPLETED | OUTPATIENT
Start: 2025-02-16 | End: 2025-02-16

## 2025-02-16 RX ORDER — MAGNESIUM OXIDE 400 MG/1
400 TABLET ORAL EVERY 4 HOURS
Status: COMPLETED | OUTPATIENT
Start: 2025-02-16 | End: 2025-02-16

## 2025-02-16 RX ORDER — ALBUTEROL SULFATE 90 UG/1
2 INHALANT RESPIRATORY (INHALATION) EVERY 6 HOURS PRN
Status: DISCONTINUED | OUTPATIENT
Start: 2025-02-16 | End: 2025-03-01 | Stop reason: HOSPADM

## 2025-02-16 RX ADMIN — HEPARIN SODIUM 1900 UNITS/HR: 10000 INJECTION, SOLUTION INTRAVENOUS at 08:40

## 2025-02-16 RX ADMIN — SEVELAMER CARBONATE 800 MG: 800 TABLET, FILM COATED ORAL at 08:32

## 2025-02-16 RX ADMIN — SENNOSIDES AND DOCUSATE SODIUM 1 TABLET: 50; 8.6 TABLET ORAL at 20:22

## 2025-02-16 RX ADMIN — Medication 12.5 MG: at 08:33

## 2025-02-16 RX ADMIN — CALCITRIOL CAPSULES 0.25 MCG 0.25 MCG: 0.25 CAPSULE ORAL at 20:22

## 2025-02-16 RX ADMIN — SENNOSIDES AND DOCUSATE SODIUM 1 TABLET: 50; 8.6 TABLET ORAL at 08:33

## 2025-02-16 RX ADMIN — Medication 400 MG: at 18:00

## 2025-02-16 RX ADMIN — ACETAMINOPHEN 975 MG: 325 TABLET, FILM COATED ORAL at 22:24

## 2025-02-16 RX ADMIN — CLOPIDOGREL BISULFATE 75 MG: 75 TABLET ORAL at 20:23

## 2025-02-16 RX ADMIN — MIDODRINE HYDROCHLORIDE 10 MG: 2.5 TABLET ORAL at 17:07

## 2025-02-16 RX ADMIN — SEVELAMER CARBONATE 800 MG: 800 TABLET, FILM COATED ORAL at 12:36

## 2025-02-16 RX ADMIN — MIDODRINE HYDROCHLORIDE 10 MG: 2.5 TABLET ORAL at 08:59

## 2025-02-16 RX ADMIN — METOPROLOL SUCCINATE 12.5 MG: 25 TABLET, EXTENDED RELEASE ORAL at 20:23

## 2025-02-16 RX ADMIN — SEVELAMER CARBONATE 800 MG: 800 TABLET, FILM COATED ORAL at 18:00

## 2025-02-16 RX ADMIN — ACETAMINOPHEN 975 MG: 325 TABLET, FILM COATED ORAL at 06:18

## 2025-02-16 RX ADMIN — ONDANSETRON 4 MG: 2 INJECTION, SOLUTION INTRAMUSCULAR; INTRAVENOUS at 20:49

## 2025-02-16 RX ADMIN — WARFARIN SODIUM 3 MG: 3 TABLET ORAL at 17:07

## 2025-02-16 RX ADMIN — ALBUTEROL SULFATE 2 PUFF: 108 INHALANT RESPIRATORY (INHALATION) at 17:07

## 2025-02-16 RX ADMIN — Medication 1 CAPSULE: at 08:33

## 2025-02-16 RX ADMIN — PANTOPRAZOLE SODIUM 40 MG: 40 TABLET, DELAYED RELEASE ORAL at 08:33

## 2025-02-16 RX ADMIN — Medication 5 MG: at 22:26

## 2025-02-16 RX ADMIN — ACETAMINOPHEN 975 MG: 325 TABLET, FILM COATED ORAL at 15:36

## 2025-02-16 RX ADMIN — ATORVASTATIN CALCIUM 40 MG: 40 TABLET, FILM COATED ORAL at 20:22

## 2025-02-16 RX ADMIN — Medication 400 MG: at 20:23

## 2025-02-16 ASSESSMENT — ACTIVITIES OF DAILY LIVING (ADL)
ADLS_ACUITY_SCORE: 66
ADLS_ACUITY_SCORE: 67
ADLS_ACUITY_SCORE: 66
ADLS_ACUITY_SCORE: 66
ADLS_ACUITY_SCORE: 67
ADLS_ACUITY_SCORE: 66
ADLS_ACUITY_SCORE: 67
ADLS_ACUITY_SCORE: 67
ADLS_ACUITY_SCORE: 66
ADLS_ACUITY_SCORE: 67
ADLS_ACUITY_SCORE: 66
ADLS_ACUITY_SCORE: 66
ADLS_ACUITY_SCORE: 67
ADLS_ACUITY_SCORE: 66
ADLS_ACUITY_SCORE: 67
ADLS_ACUITY_SCORE: 67

## 2025-02-16 NOTE — PROVIDER NOTIFICATION
MD Notification    Notified Person: MD    Notified Person Name: Dr. Gamble    Notification Date/Time: 02/15/25  1400    Notification Interaction: Amocm    Purpose of Notification: Pt R groin site appears slightly more swollen and R foot pulses are weak sounding on the doppler, please come assess    Orders Received: MD came to patient bedside and assessed pt. Was able to dopple pulses and evaluate R groin site.    Comments: No new orders

## 2025-02-16 NOTE — PROVIDER NOTIFICATION
MD Notification    Notified Person: MD    Notified Person Name:  Kathy    Notification Date/Time: 02/15/25  1520    Notification Interaction: Leonel    Purpose of Notification: Pharmacy advised to hold midodrine, Would you like to keep in place continuous pulse ox order, Sorry to bother you again, pt also has had almost no urine creation since before 0500 and has had very little output in the past 24 hours. Pt is on hemodialysis. Pt bladder scanned for 188.    Orders Received: Okay to hold midodrine dose at 1800. Ok to discontinue continuous pulse ox. MD aware of low UOP, continue to measure UOP. Page nephrology as well.    Comments: Nephrology was paged, no response was heard back, MD was aware of this and reported to continue to measure UOP.

## 2025-02-16 NOTE — PROVIDER NOTIFICATION
MD Notification    Notified Person: MD    Notified Person Name:  Tye    Notification Date/Time: 02/16/25  12:04 PM      Notification Interaction: Amcom    Purpose of Notification: MD d/c'd continuous heparin order, is it okay to discontinue the heparin Xa recheck at 1624?     Orders Received: Heparin Xa recheck discontinued by MD    Comments:

## 2025-02-16 NOTE — PROVIDER NOTIFICATION
MD Notification    Notified Person: MD    Notified Person Name:  Dak[place]an    Notification Date/Time: 02/15/25  9120    Notification Interaction: Amcom    Purpose of Notification: Pt last urination was around 0500 this AM, has not urinated since. Pt has had low UOP for the past 24 hours. Pt is on hemodialysis.     Orders Received: No response    Comments: Hospitalist aware of no response from nephrology

## 2025-02-16 NOTE — PROGRESS NOTES
Lab contacted again about patient needed stat troponin lab done, lab had left a note that the patient was not available, lab reported someone was coming up now

## 2025-02-16 NOTE — PROGRESS NOTES
Care Management Follow Up    Length of Stay (days): 26    Expected Discharge Date: 02/17/2025     Concerns to be Addressed: discharge planning     Patient plan of care discussed at interdisciplinary rounds: Yes    Anticipated Discharge Disposition: Skilled Nursing Facility              Anticipated Discharge Services:    Anticipated Discharge DME:      Patient/family educated on Medicare website which has current facility and service quality ratings:    Education Provided on the Discharge Plan:    Patient/Family in Agreement with the Plan: yes    Referrals Placed by CM/SW:    Private pay costs discussed: Not applicable    Discussed  Partnership in Safe Discharge Planning  document with patient/family: No     Handoff Completed: No, handoff not indicated or clinically appropriate    Additional Information:  Per [facility] messaging [facility] is considering patient pending bed availability this coming week. SW can follow up on Monday for bed availability and whether they can accept as they do not have anyone in admissions over the weekend    Next Steps: Secure bed    KATELYN Meyers

## 2025-02-16 NOTE — PROVIDER NOTIFICATION
MD Notification    Notified Person: MD    Notified Person Name: Dr. Chavez    Notification Date/Time: 02/16/25  1006    Notification Interaction: Vocmark    Purpose of Notification: Lactic fired and came back at 1.1. Would you like a tele order on patient?    Orders Received: No need for tele. MD aware of lactic    Comments:

## 2025-02-16 NOTE — PLAN OF CARE
Date & Time: 02/16/25 7133-5155  Behavior & Aggression: Green   Fall Risk: Yes   Orientation: A/Ox4  ABNL VS/O2: VSS on RA ex tachycardic  ABNL Labs: See chart  Pain Management: Denies pain  Bowel/Bladder: Voiding in urinal, low UOP, MD aware. Passing gas.  IV/Drains: 2 PIV SL. Dialysis port. PD port.  Skin: RLE incision sites CDI, pulses were doppled as ordered. R foot amputation ace wrap and gauze in place, CDI, black heel/malleolus wounds. R groin site w/ hematoma. Peritoneal site CDI. Doppler pulses intact.  Diet: Regular diet and low K+, fair appetite, denies nausea  Activity Level: Not OOB, up w/ oscar steady, refused to get to edge of bed with staff this shift, education about risks of refusal provided, pt verbalized understanding. T&R q2hrs completed.  Tests/Procedures: Chest x-ray  Anticipated  DC Date: TBD.  Significant information: Pt was evaluated 2x by MD d/t tachycardia and SOB. Pt denies chest pain. Mag replacement initiated.

## 2025-02-16 NOTE — PROGRESS NOTES
[facility]    Medicine Progress Note - Hospitalist Service    Date of Admission:  1/21/2025    Assessment & Plan   Arnulfo Pritchett is a 64 year old male with past medical history of severe heart failure (EF 30%), ESRD (on peritoneal dialysis), chronic paroxysmal AF on warfarin, LLE PAD s/p endarterectomy, RCC s/p nephrectomy admitted on 1/21/2025 for septic shock secondary to right leg PAD with worsening right heel necrotic ulcer. The patient was seen in ED at outside hospital on 1/3/25 and diagnosed with pneumonia, later completing course of Cefdinir. He then presented on 1/21/25 to outside hospital for right leg pain and found to have right leg ulcer and transferred to [facility] for vascular surgery evaluation. Now s/p right common femoral BK popliteal/tibial endarterectomy and right common femoral to below-knee popliteal/tibial PTFE bypass performed on 1/27/25. The patient was admitted to the ICU for requirement of mechanical ventilation and pressor support following surgery for multifactorial shock.      Hospitalist service consulted 1/31/2025 for transfer out of ICU and to assist with medical management along with vascular surgery, podiatry and Infectious disease.    Hx PAD of LLE s/p endarterectomy and fem-tibioperoneal trunk bypass on 10/25/24  Hx RLE arterial occlusion s/p SFA angioplasty and stent 5/2024  PAD RLE, right heel gangrene, occluded SFA, no evidence of osteomyelitis  s/p right common femoral and popliteal/tibial endarterectomy, right femoral to popliteal/tibial PTFE bypass 1/27/25   -Found to have critical limb ischemia on admission on January 21, for details see admission note.   -Distributive shock following surgery followed by the ICU service until 1/31.  -Followed by vascular surgery, podiatry, wound care, and ID.  -Coumadin has been held since admission, was was started on IV heparin drip since admission.  -Restarted warfarin on 2/14/2025 after discussion with  vascular surgery.  INR now in goal range, heparin gtt discontinued.  -Has been maintained on statin and Plavix 75 mg daily during this hospital stay.  -Recent IV piperacillin/tazobactam (Zosyn), discontinued 2/2 per ID after 12 days of Zosyn; follow-up cultures, monitor off antibiotics.  -Wound care per surgery and WOC.  -pain control, see hospital orders.  -worsening ischemic changes in 2nd-5th toes of right foot.  Podiatry and vascular surgery following.  -Underwent transmetatarsal amputation on 2/11/2025.  Tolerated procedure well.  Intraoperatively no signs of infection.  No further surgery per podiatry.  -Leukocytosis improved postsurgery.  Hemoglobin trended down to 7 on 2/13/2025 requiring transfusion, Hgb stable since then.    Multifactorial shock, resolved  ICU admission, weaned off vasopressors 1/30.  -Transitioned to midodrine with increase in prior to admission dose on1/31.  -Monitor vitals     End-stage renal disease (ESRD), on peritoneal dialysis  Hx of secondary hyperparathyroidism and hyperphosphatemia, phosphorous elevated above baseline of 6 at most recent of 7.9  Nephrology following.  -Chronic peritoneal dialysis for the last 3.5 years. Patient expressed interest to switch to HD. Nephrology discussed with his primary nephrologist who agrees to transition to hemodialysis.  Underwent right tunneled IJ catheter placement by IR on 2/12/2025.  Now undergoing HD on MWF schedule.  - On 2/16, patient having some episodes of shortness of breath that last 30-60 seconds. ECG without any changes concerning for ACS. CXR shows moderate partially loculated R pleural effusion which is the same to slightly worse from his last CXR. Currently satting well on room air. Will restart albuterol inhaler that patient was using outpatient after his recent pneumonia and monitor for improvement after dialysis tomorrow, possible more volume needs to be taken off during HD. If no improvement with SOB after dialysis run  tomorrow, would place IR consult for repeat thoracentesis.  -Continue midodrine.  -Will defer removal of peritoneal dialysis catheter to nephrology.     Acute hypoxemic respiratory failure, resolved  Right pleural effusion  Status post thoracentesis 1/24/25, repeat 2/2/25  -Following surgery on admission by vascular surgery was intubated.  Recent pneumonia treated with 3 weeks of Ceftin prior to this admission. Extubated 1/29/25  -Right pleural effusion.  Status post thoracentesis 1/24/25 with 1.5 L clear yellow fluid removed, likely transudative based on low cell count of 117, , and protein of 1.7. Possibly secondary to reduced peritoneal dialysis during hospitalization vs underlying severe HFrEF (30%).  -CT chest 2/1/25, see report, no clear pneumonia; large right pleural effusion noted.  -Now off oxygen. Encourage incentive spirometry.  -Antibiotics as above, discontinued by ID 2/2  -ID consulted 2/1 as above, recommend monitoring patient off antibiotics  -S/p repeat Right thoracentesis on 2/2 with 2.3 L of rolan fluid removed; additional studies, cultures (negative to date), cytology (negative for malignancy)      Paroxysmal atrial fibrillation  Chronic anticoagulation prior to admission, warfarin - ON HOLD  Ischemic cardiomyopathy with HLD, CAD s/p multiple stents, and severe HFrEF (30%)   Troponin elevation, likely type 2 MI  Wide complex tachycardia 2/1/25  Assessment-postoperatively has been maintained on heparin drip.  Coumadin has been held during his hospital stay.  -Echo 1/31-EF 25 to 30%.  Severe global hypokinesis with abnormal septal motion consistent with conduction abnormality.  Severe inferior wall hypokinesis.  LVH.  RV mildly dilated and mildly reduced RV function.  Mild mitral regurgitation.  Mild to moderate mitral stenosis.  Moderate aortic stenosis.  Left ventricular fairly pressures elevated.  -Episode of wide-complex tachycardia noted by nursing staff, 15 beats, up on  2/1/2025.  -Continue inpatient telemetry.  -EP consulted on 2/16 given tachycardia with rates to 120s. They evaluated patient and said not unexpected given patient's severe HFrEF.   - Recommended increasing Toprol XL to 12.5 mg BID (PTA was on 25 mg daily)  -Troponin elevated to 513 on 2/16, checked for report of shortness of breath and new incomplete RBBB on ECG on 2/16. Recheck ordered stat and pending to trend. Noted that this is elevated in setting of ESRD and difficult to interpret because of that; patient currently denies chest pain.  -Coumadin restarted on 2/14/2025. INR in goal range, so heparin gtt discontinued now.     Anemia of chronic disease  Baseline around 10.  Has been stable in the 8 range. Did need 1 U PRBC on 2/8 and 2/13 for Hgb<7 without signs of active bleeding.  -Nephrology started patient on EPO on 2/9 and to continue weekly.  -Continue to monitor hemoglobin daily,      Hyperglycemia  Type 2 diabetes   hemoglobin A1c 5.7% October 24  PTA regimen: Lantus 35 units at bedtime  Insulin requirements have been decreasing and he started having hypoglycemia episodes after being started on HD on 2/13/2025  Discontinue Lantus and carb coverage  Continue sliding scale insulin. Discharge insulin dosing dependent on requirements from sliding scale.  Continue to monitor FSBS     Mixed anion gap acidosis, resolved  -Improved respiratory function, monitor.  Anion gap resolved 1/31.     Abdominal discomfort 1/30, resolved  Noted to have some right upper quadrant pain 1/30.  Now resolved.  LFTs normal.  Was on Zosyn for above surgical issues.   -Monitor abdominal exam.  -Antibiotics managed as noted above.     Moderate protein calorie nutrition  -Nutrition consulted.  -Speech and language therapy (SLP) consulted, diet per speech therapy.     Gout  -PTA allopurinol currently on hold, reassess daily.     History of renal cell carcinoma   -Status post right nephrectomy, monitor.     History of obstructive sleep  "apnea.  By report, intolerant of CPAP   -Monitor, follow-up with outpatient provider.     Weakness and deconditioning  -PT, OT, speech and language therapy (SLP) consulted.  -Increase activity as tolerated.  -Currently TCU recommended. If respiratory status improved and patient feeling better after HD run tomorrow, would be medically ready for discharge to TCU if cleared by vascular surgery as well.     Disposition  Anticipated discharge timing see Disposition Plan below.  -Anticipated discharge to transitional care unit.  -Social work consulted for discharge planning.     Goals of care discussion:  Patient had mentioned tonursing staff about \"wanting to die\", aand also mentioned to prior hospitalist he is tired of living like this and he did not think he was going to make it out of the hospital. Palliative consulted. When palliative saw patient the next day, patient appeared to be in a better mood and stated he wanted to continue restorative cares. He has however mentioned to Nephrology that he will like to switch from peritoneal dialysis to HD as it will make him feel less fatigued , if however HD does not help, then he would consider comfort measures.                 Diet: Combination Diet Regular Diet; 3 gm K Diet (low potassium)  Snacks/Supplements Adult: Ensure Enlive; Between Meals    DVT Prophylaxis: Warfarin with heparin gtt bridge  Rosario Catheter: Not present  Lines: PRESENT             Cardiac Monitoring: None  Code Status: Full Code      Clinically Significant Risk Factors        # Hyperkalemia: Highest K = 6 mmol/L in last 2 days, will monitor as appropriate  # Hyponatremia: Lowest Na = 128 mmol/L in last 2 days, will monitor as appropriate  # Hypochloremia: Lowest Cl = 90 mmol/L in last 2 days, will monitor as appropriate      # Hypoalbuminemia: Lowest albumin = 1.6 g/dL at 2/12/2025  7:26 AM, will monitor as appropriate     # Hypertension: Noted on problem list    # Chronic heart failure with reduced " ejection fraction: last echo with EF <40%          # DMII: A1C = 6.6 % (Ref range: <5.7 %) within past 6 months    # Severe Malnutrition: based on nutrition assessment      # Financial/Environmental Concerns:           Social Drivers of Health    Tobacco Use: Medium Risk (2/11/2025)    Patient History     Smoking Tobacco Use: Former     Smokeless Tobacco Use: Never   Alcohol Use: Unknown (11/28/2018)    [facility]    AUDIT-C     Frequency of Alcohol Consumption: Monthly or less     Frequency of Binge Drinking: Never   Transportation Needs: High Risk (1/21/2025)    Transportation Needs     Within the past 12 months, has lack of transportation kept you from medical appointments, getting your medicines, non-medical meetings or appointments, work, or from getting things that you need?: Yes   Physical Activity: Unknown (10/8/2024)    Exercise Vital Sign     Days of Exercise per Week: 0 days   Social Connections: Unknown (10/8/2024)    Social Connection and Isolation Panel [NHANES]     Frequency of Social Gatherings with Friends and Family: Patient declined          Disposition Plan     Medically Ready for Discharge: Anticipated Tomorrow or Tuesday pending respiratory status after HD tomorrow, possible thoracentesis if needed, and vascular surgery clearance. Will also need TCU placement           Viraj Chavez MD  Hospitalist Service  [facility]  Securely message with Viewpoint (more info)  Text page via [facility] Paging/Directory   ______________________________________________________________________    Interval History   NAEO. NNR. Patient had HD run today and has HD line in place which he tolerated but is just mildly fatigued afterwards. Patient does still have hematoma and tenderness around R groin incision site. He says this is not much different from yesterday. Vascular surgery plans to come  reevaluate him later today.  Otherwise, patient has no complaints or concerns.  We discussed that we are still waiting for his INR to get to goal so that he can be discharged from the hospital.  He will need to go to TCU from the hospital and will need placement.    Came to evaluate patient again later in the day after nursing staff mentioned he stated he was having trouble catching his breath. He tells me he gets episodes of this sensation since he had pneumonia a few weeks ago. The episodes last 30-60 seconds and resolve on their own. He currently has normal O2 saturation on room air.      Physical Exam   Vital Signs: Temp: 97.3  F (36.3  C) Temp src: Oral BP: 110/84 Pulse: 110   Resp: 20 SpO2: 97 % O2 Device: None (Room air)    Weight: 176 lbs 5.89 oz  Constitutional: Awake, alert, cooperative, no apparent distress.    Pulmonary: No increased work of breathing, slightly decreased air exchanged on R middle/lower lung fields, clear to auscultation bilaterally, no crackles or wheezing.  Cardiovascular: Mildly tachycardic. Regular rhythm, normal S1 and S2, no S3 or S4. No murmurs, rubs, or gallops noted.  GI: Normal bowel sounds, soft, non-distended, non-tender.  No guarding or rebound.  Skin/Integumentary: R groin surgical site is slightly erythematous and indurated with mild tenderness, per report and prior progress note stable from yesterday.  Extremities: S/p R transmetatarsal amputation.  Neuro: A&Ox4. Conversant. Responds appropriately in conversation. Moves all extremities with no focal deficit identified.        Medical Decision Making       52 MINUTES SPENT BY ME on the date of service doing chart review, history, exam, documentation & further activities per the note.      Data   ------------------------- PAST 24 HR DATA REVIEWED -----------------------------------------------    I have personally reviewed the following data over the past 24 hrs:    11.4 (H)  \   7.9 (L)   / 422     130 (L) 93 (L) 28.5 (H) /  " 163 (H)   4.6 26 3.03 (H) \     Trop: 513 (HH) BNP: N/A     Procal: N/A CRP: N/A Lactic Acid: 1.1       INR:  2.06 (H) PTT:  N/A   D-dimer:  N/A Fibrinogen:  N/A       Imaging results reviewed over the past 24 hrs:   Recent Results (from the past 24 hours)   XR Chest Port 1 View    Narrative    EXAM: XR CHEST PORT 1 VIEW  LOCATION: [facility]  DATE: 2/16/2025    INDICATION: states \"I can't seem to catch my breath\" but satting well on room air  COMPARISON: 2/5/2025      Impression    IMPRESSION: Heart is mildly enlarged, unchanged. Right IJ central venous catheter tip in the distal SVC. Moderate to large partially loculated right effusion and small left effusion with atelectasis. Overall appearance is similar when compared to prior   examination.     "

## 2025-02-16 NOTE — PLAN OF CARE
2/15/25    [id number]    Orientation: A/Ox4    Vitals/Tele: VSS, RA     IV Access/drains: PIV infusing heparin @ 1900 units/hr. Xa recheck ordered for 6 hours after last result per protocol. Other PIV SL. Dialysis port.     Diet: 3 gm K     Mobility: Ax2 w/lift    GI/:  Voiding in urinal, 1 BM this shift.    Wound/Skin: RLE incision sites CDI, pulses were doppled.     Consults: PT, OT    Discharge Plan: Pending       See Flow sheets for assessme

## 2025-02-16 NOTE — PROGRESS NOTES
MD Notification    Notified Person: MD    Notified Person Name: Dr. Chavez    Notification Date/Time: 02/16/25  7:44 AM      Notification Interaction: Leonel    Purpose of Notification: HR was tachy overnight up to 120. This morning it was going between 117-120. Pt did get metoprolol later in the day yesterday d/t dialysis and didn't get it until 1300. Pt does have scheduled metoprolol due at 9    Orders Received: EKG ordered, cardiology consult placed    Comments: EKG completed and MD notified when it had resulted in chart

## 2025-02-16 NOTE — PLAN OF CARE
Date & Time: 02/15/25 2998-4859  Behavior & Aggression: Green   Fall Risk: Yes   Orientation: A/Ox4  ABNL VS/O2:VSS on RA  ABNL Labs: Hgb 8.3  Pain Management: C/O pain in R leg, controlled with scheduled tylenol  Bowel/Bladder: Voiding in urinal, not very much UOP, MD aware. Passing gas, BM x1 this shift.  IV/Drains: PIV infusing heparin @ 1600 units/hr. Xa recheck ordered for 6 hours after last result per protocol. Other PIV SL. Dialysis port. PD port - dressing changed per MD order  Skin: RLE incision sites CDI, pulses were doppled. R foot amputation moderate serosanguineous drainage  ace wrap and gauze in place, black heel/malleolus wounds. Dressing change done to R foot. R groin site w/ hematoma - MD evaluated this afternoon. Peritoneal site CDI. Doppler pulses intact.  Diet: Regular diet and low K+, fair appetite, denies nausea  Activity Level: Not OOB, up w/ oscar steady, sat at edge of bed this shift with staff. A2 for cares,T&R q2hrs completed.  Tests/Procedures: N/A   Anticipated  DC Date: TBD.  Significant information: On continuous Hep gtt. Dialysis port CDI. CHG wipes complete and linens changed.

## 2025-02-16 NOTE — CONSULTS
[facility]    Cardiac Electrophysiology Consultation     Date of Admission:  1/21/2025  Date of Consult (When I saw the patient): 02/16/25    Assessment & Plan   Arnulfo Pritchett is a 64 year old male who was admitted on 1/21/2025. I was asked to see the patient for nsvt.  complex medical history including:  CAD with inferior STEMI in 2017 s/p RCA PCI, PCI of the OM and LCx in 2021,  angiogram (11/24) shows moderate-severe disease in the proximal RCA and mid-distal LCx which were both likely flow-limiting, but stable in appearance, and given lack of angina and numerous other complicating factors such as recurrent hypotension, severe anemia requiring transfusion, etc., PCI was deferred.    Chronic ICM with EF 25-30%  Severe PAD with multiple surgical and percutaneous interventions  ESRD, used to be on PD but now HD  pAF with CVA  6.  Anemia  7.  DM  8.  Labile hypotension on Midodrine    Presented with septic shock due to right heel necrotic ulcer, s/p right common femoral and popliteal/tibial endarterectomy and bypass required prolonged ICU stay and amputation of transmetatarsal noted to have 15-beat run of nsvt for which the patient was asymptomatic.    Not unexpected to have nsvt in pt with chronic ischemic cardiomyopathy. Even though pt is at risk of malignant arrhythmia ICD is not indicated in pts with ESRD - very poor protoplasm with greater or equal chance of dying from non arrhythmic cause. Increase toprol to 12.5 mg bid. Will follow peripherally.    Tommy Francis MD    Code Status    Full Code    Primary Care Physician   Chai Griffin    History is obtained from the patient    Past Medical History   I have reviewed this patient's medical history and updated it with pertinent information if needed.   Past Medical History:   Diagnosis Date    CAD (coronary artery disease)     Chronic systolic heart failure (H)     ESRD (end stage renal disease) on dialysis (H)     Gastroesophageal  reflux disease without esophagitis     Gout     HLD (hyperlipidemia)     TALI (obstructive sleep apnea)     PAD (peripheral artery disease)     S/p RLE stenting of SFA/popliteal arteries    Type 2 diabetes mellitus with diabetic neuropathy (H)        Past Surgical History   I have reviewed this patient's surgical history and updated it with pertinent information if needed.  Past Surgical History:   Procedure Laterality Date    AMPUTATE FOOT Left 2/11/2025    Procedure: Right foot transmetatarsal amputation;  Surgeon: Alyce Salas DPM;  Location: [facility] OR    AMPUTATE TOE(S) Left 10/27/2024    Procedure: AMPUTATION, TOE left great toe;  Surgeon: Haley Joseph DPM, Podiatry/Foot and Ankle Surgery;  Location: [facility] OR    BYPASS GRAFT FEMOROPOPLITEAL Right 1/27/2025    Procedure: RIGHT FEMORAL ARTERY TO RIGHT POPLITEAL ARTERY ENDARTERECTOMY,  COMMON FEMORAL TO POPLITEAL BELOW KNEE COMPOSITE GREATER SAPHENOUS/PTFE BYPASS GRAFT. GREATER SAPHENOUS VEIN HARVEST;  Surgeon: Patrick Hein MD;  Location: [facility] OR    BYPASS GRAFT FEMOROTIBIAL Left 10/25/2024    Procedure: LEFT FEMORAL ENDARTERECTOMY, LEFT FEMORAL TO TIBIAL PERONEAL TRUNK BYPASS WITH LEFT GREAT SEPHANEOUS VEIN, WOUND VAC PLACEMENT ON LEFT GROIN.;  Surgeon: Raul Gray MD;  Location:  OR    BYPASS GRAFT FEMOROTIBIAL Left 10/27/2024    Procedure: CREATION, BYPASS, VASCULAR, FEMORAL TO TIBIAL REVISION OF BYPASS LEFT COMMON FEMORAL TO TIBIAL.;  Surgeon: Raul Gray MD;  Location:  OR    CV CORONARY ANGIOGRAM N/A 11/27/2024    Procedure: Coronary Angiogram;  Surgeon: Clem Matt MD;  Location:  HEART CARDI[facility] CATH LAB    CV LOWER EXTREMITY ANGIOGRAM LEFT Left 10/27/2024    Procedure: Angiogram Lower Extremity Left;  Surgeon: Raul Gray MD;  Location:  OR    CV PCI N/A 11/27/2024    Procedure: Percutaneous Coronary Intervention;  Surgeon: Clem Matt MD;  Location:  HEART CARDI[facility] CATH LAB    IR  CVC TUNNEL PLACEMENT > 5 YRS OF AGE  2/12/2025    IR LOWER EXTREMITY ANGIOGRAM LEFT  10/17/2024    IR LOWER EXTREMITY ANGIOGRAM RIGHT  1/23/2025    OR ANGIOGRAM, LOWER EXTREMITY Right 1/27/2025    Procedure: INTRAOPERATIVE ANGIOGRAM;  Surgeon: Patrick Hein MD;  Location:  OR       Prior to Admission Medications   Prior to Admission Medications   Prescriptions Last Dose Informant Patient Reported? Taking?   B Complex-C-Folic Acid (DIALYVITE) TABS 1/20/2025 Morning Self Yes Yes   Sig: Take 1 tablet by mouth daily.   Continuous Glucose Sensor (DEXCOM G7 SENSOR) MISC  Self No No   Sig: Change every 10 days.   SEVELAMER CARBONATE PO 1/20/2025 Evening Self Yes Yes   Sig: Take 800 mg by mouth 3 times daily (with meals)   acetaminophen (TYLENOL) 500 MG tablet 1/21/2025 Evening Self Yes Yes   Sig: Take 1,000 mg by mouth every 8 hours as needed.   allopurinol (ZYLOPRIM) 100 MG tablet 1/20/2025 Evening Self Yes Yes   Sig: Take 200 mg by mouth every evening   atorvastatin (LIPITOR) 40 MG tablet 1/20/2025 Evening Self Yes Yes   Sig: Take 40 mg by mouth at bedtime.   calcitRIOL (ROCALTROL) 0.25 MCG capsule 1/20/2025 Bedtime Self Yes Yes   Sig: Take 0.25 mcg by mouth at bedtime.   cinacalcet (SENSIPAR) 60 MG tablet 1/20/2025 Evening Self Yes Yes   Sig: Take 60 mg by mouth. Take 1 tablet (60 mg) three times a week: Monday, Wednesday, and Friday nights   clopidogrel (PLAVIX) 75 MG tablet 1/20/2025 Evening Self No Yes   Sig: Take 1 tablet (75 mg) by mouth every evening.   gentamicin (GARAMYCIN) 0.1 % external cream 1/20/2025 Evening Self Yes Yes   Sig: Apply topically daily as needed. Apply topically on peritoneal access site to prevent infections   glucose 40 % (400 mg/mL) gel Unknown Self No Yes   Sig: Take 15-30 g by mouth every 15 minutes as needed for low blood sugar.   insulin glargine (LANTUS PEN) 100 UNIT/ML pen 1/20/2025 Bedtime Self No Yes   Sig: Inject 35 Units subcutaneously at bedtime.   melatonin 5 MG  tablet 1/20/2025 Bedtime Self Yes Yes   Sig: Take 5 mg by mouth at bedtime   metoprolol succinate ER (TOPROL XL) 25 MG 24 hr tablet 1/21/2025 Evening Self No Yes   Sig: Take 1 tablet (25 mg) by mouth daily.   midodrine (PROAMATINE) 2.5 MG tablet Past Month Self No Yes   Sig: Take 1 tablet (2.5 mg) by mouth once as needed (hypotension/dizziness post PD in AM during the day).   omeprazole (PRILOSEC) 20 MG DR capsule 1/20/2025 Morning Self Yes Yes   Sig: Take 20 mg by mouth daily.   warfarin ANTICOAGULANT (COUMADIN) 5 MG tablet 1/19/2025 Evening Self No Yes   Sig: Take 1 tablet (5 mg) by mouth daily.   Patient taking differently: Take 5 mg by mouth five times a week. Tues, Wed, Thurs, Saturdays and Sundays.   warfarin ANTICOAGULANT (COUMADIN) 5 MG tablet 1/20/2025 Evening Self Yes Yes   Sig: Take 7.5 mg by mouth twice a week. Mondays and Fridays..      Facility-Administered Medications: None     Allergies   No Known Allergies    Social History   I have reviewed this patient's social history and updated it with pertinent information if needed. Arnulfo Pritchett  reports that he has quit smoking. His smoking use included cigarettes. He has never used smokeless tobacco. He reports that he does not currently use alcohol. He reports that he does not use drugs.    Family History   I have reviewed this patient's family history and updated it with pertinent information if needed.   History reviewed. No pertinent family history.    Review of Systems   Comprehensive review of systems was performed with pertinent positives and negatives listed in assessment and plan section.    Physical Exam   Temp: 97.2  F (36.2  C) Temp src: Oral BP: 127/90 Pulse: 119   Resp: 18 SpO2: 93 % O2 Device: None (Room air)    Vital Signs with Ranges  Temp:  [97.1  F (36.2  C)-97.6  F (36.4  C)] 97.2  F (36.2  C)  Pulse:  [] 119  Resp:  [16-35] 18  BP: (109-154)/() 127/90  SpO2:  [93 %-99 %] 93 %  176 lbs 5.89 oz    Constitutional: awake,  alert, cooperative, no apparent distress, and appears stated age  Eyes: Lids and lashes normal, pupils equal, round and reactive to light, extra ocular muscles intact, sclera clear, conjunctiva normal  ENT: Normocephalic, without obvious abnormality, atraumatic, sinuses nontender on palpation, external ears without lesions, oral pharynx with moist mucous membranes, tonsils without erythema or exudates, gums normal and good dentition.  Hematologic / Lymphatic: no cervical lymphadenopathy  Respiratory: No increased work of breathing, good air exchange, clear to auscultation bilaterally, no crackles or wheezing  Cardiovascular: Normal apical impulse, regular rate and rhythm, normal S1 and S2, no S3 or S4, and no murmur noted  GI: No scars, normal bowel sounds, soft, non-distended, non-tender, no masses palpated, no hepatosplenomegally  Skin: no bruising or bleeding  Musculoskeletal: There is no redness, warmth, or swelling of the joints.  Full range of motion noted.  Right foot in dressing.   Neurologic: Awake, alert, l, calm, and normal eye contact    Data   I personally reviewed all recent ECGs and images.  Results for orders placed or performed during the hospital encounter of 01/21/25 (from the past 24 hours)   Glucose by meter   Result Value Ref Range    GLUCOSE BY METER POCT 94 70 - 99 mg/dL   Glucose by meter   Result Value Ref Range    GLUCOSE BY METER POCT 84 70 - 99 mg/dL   Heparin Unfractionated Anti Xa Level   Result Value Ref Range    Anti Xa Unfractionated Heparin 0.18 For Reference Range, See Comment IU/mL    Narrative    Therapeutic Range: UFH: 0.25-0.50 IU/mL for low intensity dosing,  0.30-0.70 IU/mL for high intensity dosing DVT and PE.  This test is not validated for other direct factor X inhibitors (e.g. rivaroxaban, apixaban, edoxaban, betrixaban, fondaparinux) and should not be used for monitoring of other medications.   Iron and iron binding capacity   Result Value Ref Range    Iron 20 (L) 61 -  157 ug/dL    Iron Binding Capacity 129 (L) 240 - 430 ug/dL    Iron Sat Index 16 15 - 46 %   Basic metabolic panel   Result Value Ref Range    Sodium 132 (L) 135 - 145 mmol/L    Potassium 4.3 3.4 - 5.3 mmol/L    Chloride 94 (L) 98 - 107 mmol/L    Carbon Dioxide (CO2) 28 22 - 29 mmol/L    Anion Gap 10 7 - 15 mmol/L    Urea Nitrogen 20.8 8.0 - 23.0 mg/dL    Creatinine 2.48 (H) 0.67 - 1.17 mg/dL    GFR Estimate 28 (L) >60 mL/min/1.73m2    Calcium 9.1 8.8 - 10.4 mg/dL    Glucose 126 (H) 70 - 99 mg/dL   Glucose by meter   Result Value Ref Range    GLUCOSE BY METER POCT 111 (H) 70 - 99 mg/dL   CBC with platelets   Result Value Ref Range    WBC Count 10.5 4.0 - 11.0 10e3/uL    RBC Count 2.79 (L) 4.40 - 5.90 10e6/uL    Hemoglobin 8.3 (L) 13.3 - 17.7 g/dL    Hematocrit 25.8 (L) 40.0 - 53.0 %    MCV 93 78 - 100 fL    MCH 29.7 26.5 - 33.0 pg    MCHC 32.2 31.5 - 36.5 g/dL    RDW 20.3 (H) 10.0 - 15.0 %    Platelet Count 388 150 - 450 10e3/uL   Glucose by meter   Result Value Ref Range    GLUCOSE BY METER POCT 118 (H) 70 - 99 mg/dL   Glucose by meter   Result Value Ref Range    GLUCOSE BY METER POCT 110 (H) 70 - 99 mg/dL   Heparin Unfractionated Anti Xa Level   Result Value Ref Range    Anti Xa Unfractionated Heparin <0.10 For Reference Range, See Comment IU/mL    Narrative    Therapeutic Range: UFH: 0.25-0.50 IU/mL for low intensity dosing,  0.30-0.70 IU/mL for high intensity dosing DVT and PE.  This test is not validated for other direct factor X inhibitors (e.g. rivaroxaban, apixaban, edoxaban, betrixaban, fondaparinux) and should not be used for monitoring of other medications.   Basic metabolic panel   Result Value Ref Range    Sodium 130 (L) 135 - 145 mmol/L    Potassium 4.6 3.4 - 5.3 mmol/L    Chloride 93 (L) 98 - 107 mmol/L    Carbon Dioxide (CO2) 26 22 - 29 mmol/L    Anion Gap 11 7 - 15 mmol/L    Urea Nitrogen 28.5 (H) 8.0 - 23.0 mg/dL    Creatinine 3.03 (H) 0.67 - 1.17 mg/dL    GFR Estimate 22 (L) >60 mL/min/1.73m2     Calcium 9.6 8.8 - 10.4 mg/dL    Glucose 108 (H) 70 - 99 mg/dL   Magnesium   Result Value Ref Range    Magnesium 1.8 1.7 - 2.3 mg/dL   Phosphorus   Result Value Ref Range    Phosphorus 4.1 2.5 - 4.5 mg/dL   INR   Result Value Ref Range    INR 2.06 (H) 0.85 - 1.15   CBC with platelets   Result Value Ref Range    WBC Count 11.4 (H) 4.0 - 11.0 10e3/uL    RBC Count 2.67 (L) 4.40 - 5.90 10e6/uL    Hemoglobin 7.9 (L) 13.3 - 17.7 g/dL    Hematocrit 24.7 (L) 40.0 - 53.0 %    MCV 93 78 - 100 fL    MCH 29.6 26.5 - 33.0 pg    MCHC 32.0 31.5 - 36.5 g/dL    RDW 20.4 (H) 10.0 - 15.0 %    Platelet Count 422 150 - 450 10e3/uL   Glucose by meter   Result Value Ref Range    GLUCOSE BY METER POCT 107 (H) 70 - 99 mg/dL   EKG 12-lead, tracing only   Result Value Ref Range    Systolic Blood Pressure  mmHg    Diastolic Blood Pressure  mmHg    Ventricular Rate 111 BPM    Atrial Rate 111 BPM    MO Interval 190 ms    QRS Duration 106 ms     ms    QTc 467 ms    P Axis 59 degrees    R AXIS -89 degrees    T Axis 87 degrees    Interpretation ECG       Sinus tachycardia with occasional Premature ventricular complexes  Incomplete right bundle branch block  Left anterior fascicular block  Septal infarct (cited on or before 01-Feb-2025)  Abnormal ECG  When compared with ECG of 12-Feb-2025 08:46, (unconfirmed)  Premature ventricular complexes are now Present  Incomplete right bundle branch block is now Present  Questionable change in initial forces of Anterolateral leads     Heparin Unfractionated Anti Xa Level   Result Value Ref Range    Anti Xa Unfractionated Heparin 0.28 For Reference Range, See Comment IU/mL    Narrative    Therapeutic Range: UFH: 0.25-0.50 IU/mL for low intensity dosing,  0.30-0.70 IU/mL for high intensity dosing DVT and PE.  This test is not validated for other direct factor X inhibitors (e.g. rivaroxaban, apixaban, edoxaban, betrixaban, fondaparinux) and should not be used for monitoring of other medications.   Lactic  Acid Whole Blood w/ 1x repeat in 2 hrs when >2   Result Value Ref Range    Lactic Acid, Initial 1.1 0.7 - 2.0 mmol/L       Clinically Significant Risk Factors        # Hyperkalemia: Highest K = 6 mmol/L in last 2 days, will monitor as appropriate  # Hyponatremia: Lowest Na = 128 mmol/L in last 2 days, will monitor as appropriate  # Hypochloremia: Lowest Cl = 90 mmol/L in last 2 days, will monitor as appropriate      # Hypoalbuminemia: Lowest albumin = 1.6 g/dL at 2/12/2025  7:26 AM, will monitor as appropriate     # Hypertension: Noted on problem list  # Chronic heart failure with reduced ejection fraction: last echo with EF <40%          # DMII: A1C = 6.6 % (Ref range: <5.7 %) within past 6 months    # Severe Malnutrition: based on nutrition assessment    # Financial/Environmental Concerns:

## 2025-02-16 NOTE — PROVIDER NOTIFICATION
MD Notification    Notified Person: MD    Notified Person Name: Dr. Chavez    Notification Date/Time: 02/16/25  1430      Notification Interaction: Leonel    Purpose of Notification: Pt c/o of shortness of breath, stating he can't seem to catch his breath. BP: 110/84, HR:110, Respirations: 20, Temp: 97.3, O2 - 97 on RA. His breathing does seem to appear a bit more labored    Orders Received: MD ordered chest x-ray and troponin draw. MD evaluated patient at bedside.    Comments:

## 2025-02-16 NOTE — PROVIDER NOTIFICATION
MD Notification    Notified Person: MD    Notified Person Name: Dr. Shay    Notification Date/Time: 02/16/25  1030    Notification Interaction: Vocera    Purpose of Notification: Pt has had low urine output for the past day or so, he is on hemodialysis so I know it is expected to be lower     Orders Received: MD aware, expected low output    Comments:

## 2025-02-16 NOTE — PROGRESS NOTES
Vascular Surgery Progress Note    Patient seen this morning resting comfortably in bed in NAD. No chest pain, shortness of breath, dizziness, lightheadedness. Notes occasional palpitations .    Exam:   Afebrile, HR 110s, BP 130s-150s systolic  Awake, alert, nad  NLB on RA   Tachycardic per doppler signals  R groin with firm hematoma, eschar over R groin incision, mild erythema, hematoma extends throughout thigh - stable from yesterday  R foot wrapped  Strong monophasic AT and PT doppler signals with forward diastolic flow in RLE    Labs:   Cr 3.03  Na 130  K 4.6  Hgb 7.9 from 8.3  WBC 11.4 from 10.5  INR 2.06    A/P:   64 year old male who is s/p R fem-BK pop bypass with PTFE and proximal short GSV interposition graft on 1/27/25. Hospital course c/b worsening R pleural effusion of unclear etiology despite thoracentesis, most recent thoracentesis on 2/2, now recovering appropriately, bypass graft remains open at this time. Has known R groin hematoma. Tachycardia past few days w/ EKG w/ new incomplete R bundle branch block and several PVCs.  EP consulted, appreciate recs.      PLAN:  - Multimodal pain control   - Encourage IS   - Can stop heparin today as warfarin now therapeutic   - EP consult, appreciate recs   - sending trop out of abundance of caution given EKG changes however anticipate will be elevated in setting of CKD, will trend  - continue statin, warfarin, plavix   -oob, physical therapy, patient should be up in chair today.  -appreciate all teams involved    Kaiser Gamble MD  Vascular Surgery PGY4  To reach vascular surgery della team on call, please go to [facility] and from the drop down find the following:   VASCULAR SURGERY/[facility]  Then page the person listed to the right of day/night call. Can click the pager to page.

## 2025-02-16 NOTE — PROVIDER NOTIFICATION
MD Notification    Notified Person: MD    Notified Person Name: Dr. Chavez    Notification Date/Time: 02/15/25  2499    Notification Interaction: Vocera    Purpose of Notification: K+ level is 6.0 and phos is 5.6. I know that patient is on the schedule for HD    Orders Received: MD aware    Comments:

## 2025-02-17 LAB
ANION GAP SERPL CALCULATED.3IONS-SCNC: 13 MMOL/L (ref 7–15)
BUN SERPL-MCNC: 42.2 MG/DL (ref 8–23)
CALCIUM SERPL-MCNC: 10.3 MG/DL (ref 8.8–10.4)
CHLORIDE SERPL-SCNC: 92 MMOL/L (ref 98–107)
CREAT SERPL-MCNC: 4.18 MG/DL (ref 0.67–1.17)
EGFRCR SERPLBLD CKD-EPI 2021: 15 ML/MIN/1.73M2
ERYTHROCYTE [DISTWIDTH] IN BLOOD BY AUTOMATED COUNT: 20.4 % (ref 10–15)
GLUCOSE BLDC GLUCOMTR-MCNC: 118 MG/DL (ref 70–99)
GLUCOSE BLDC GLUCOMTR-MCNC: 124 MG/DL (ref 70–99)
GLUCOSE BLDC GLUCOMTR-MCNC: 170 MG/DL (ref 70–99)
GLUCOSE SERPL-MCNC: 133 MG/DL (ref 70–99)
HCO3 SERPL-SCNC: 24 MMOL/L (ref 22–29)
HCT VFR BLD AUTO: 27.9 % (ref 40–53)
HGB BLD-MCNC: 8.8 G/DL (ref 13.3–17.7)
INR PPP: 2.17 (ref 0.85–1.15)
MAGNESIUM SERPL-MCNC: 2 MG/DL (ref 1.7–2.3)
MCH RBC QN AUTO: 29.5 PG (ref 26.5–33)
MCHC RBC AUTO-ENTMCNC: 31.5 G/DL (ref 31.5–36.5)
MCV RBC AUTO: 94 FL (ref 78–100)
PHOSPHATE SERPL-MCNC: 5.4 MG/DL (ref 2.5–4.5)
PLATELET # BLD AUTO: 434 10E3/UL (ref 150–450)
POTASSIUM SERPL-SCNC: 5.4 MMOL/L (ref 3.4–5.3)
RBC # BLD AUTO: 2.98 10E6/UL (ref 4.4–5.9)
SODIUM SERPL-SCNC: 129 MMOL/L (ref 135–145)
TROPONIN T SERPL HS-MCNC: 515 NG/L
WBC # BLD AUTO: 13.6 10E3/UL (ref 4–11)

## 2025-02-17 PROCEDURE — 83735 ASSAY OF MAGNESIUM: CPT | Performed by: INTERNAL MEDICINE

## 2025-02-17 PROCEDURE — 36415 COLL VENOUS BLD VENIPUNCTURE: CPT | Performed by: HOSPITALIST

## 2025-02-17 PROCEDURE — 36415 COLL VENOUS BLD VENIPUNCTURE: CPT

## 2025-02-17 PROCEDURE — 250N000011 HC RX IP 250 OP 636: Performed by: STUDENT IN AN ORGANIZED HEALTH CARE EDUCATION/TRAINING PROGRAM

## 2025-02-17 PROCEDURE — 120N000013 HC R&B IMCU

## 2025-02-17 PROCEDURE — 250N000013 HC RX MED GY IP 250 OP 250 PS 637: Performed by: INTERNAL MEDICINE

## 2025-02-17 PROCEDURE — 250N000013 HC RX MED GY IP 250 OP 250 PS 637: Performed by: PODIATRIST

## 2025-02-17 PROCEDURE — 85610 PROTHROMBIN TIME: CPT | Performed by: HOSPITALIST

## 2025-02-17 PROCEDURE — 250N000013 HC RX MED GY IP 250 OP 250 PS 637: Performed by: STUDENT IN AN ORGANIZED HEALTH CARE EDUCATION/TRAINING PROGRAM

## 2025-02-17 PROCEDURE — 99232 SBSQ HOSP IP/OBS MODERATE 35: CPT | Performed by: INTERNAL MEDICINE

## 2025-02-17 PROCEDURE — 85014 HEMATOCRIT: CPT

## 2025-02-17 PROCEDURE — 82310 ASSAY OF CALCIUM: CPT | Performed by: INTERNAL MEDICINE

## 2025-02-17 PROCEDURE — 250N000013 HC RX MED GY IP 250 OP 250 PS 637

## 2025-02-17 PROCEDURE — 84100 ASSAY OF PHOSPHORUS: CPT | Performed by: INTERNAL MEDICINE

## 2025-02-17 PROCEDURE — 99232 SBSQ HOSP IP/OBS MODERATE 35: CPT | Performed by: STUDENT IN AN ORGANIZED HEALTH CARE EDUCATION/TRAINING PROGRAM

## 2025-02-17 PROCEDURE — 80048 BASIC METABOLIC PNL TOTAL CA: CPT | Performed by: INTERNAL MEDICINE

## 2025-02-17 PROCEDURE — 84484 ASSAY OF TROPONIN QUANT: CPT

## 2025-02-17 RX ORDER — MAGNESIUM SULFATE HEPTAHYDRATE 40 MG/ML
2 INJECTION, SOLUTION INTRAVENOUS ONCE
Status: COMPLETED | OUTPATIENT
Start: 2025-02-17 | End: 2025-02-17

## 2025-02-17 RX ORDER — WARFARIN SODIUM 5 MG/1
5 TABLET ORAL
Status: COMPLETED | OUTPATIENT
Start: 2025-02-17 | End: 2025-02-17

## 2025-02-17 RX ADMIN — SEVELAMER CARBONATE 800 MG: 800 TABLET, FILM COATED ORAL at 16:48

## 2025-02-17 RX ADMIN — OXYCODONE HYDROCHLORIDE 2.5 MG: 5 TABLET ORAL at 20:50

## 2025-02-17 RX ADMIN — MAGNESIUM SULFATE HEPTAHYDRATE 2 G: 40 INJECTION, SOLUTION INTRAVENOUS at 12:38

## 2025-02-17 RX ADMIN — CINACALCET 60 MG: 30 TABLET ORAL at 20:47

## 2025-02-17 RX ADMIN — SENNOSIDES AND DOCUSATE SODIUM 1 TABLET: 50; 8.6 TABLET ORAL at 20:48

## 2025-02-17 RX ADMIN — CLOPIDOGREL BISULFATE 75 MG: 75 TABLET ORAL at 20:49

## 2025-02-17 RX ADMIN — ACETAMINOPHEN 975 MG: 325 TABLET, FILM COATED ORAL at 22:53

## 2025-02-17 RX ADMIN — ATORVASTATIN CALCIUM 40 MG: 40 TABLET, FILM COATED ORAL at 21:45

## 2025-02-17 RX ADMIN — Medication 1 CAPSULE: at 09:08

## 2025-02-17 RX ADMIN — ACETAMINOPHEN 975 MG: 325 TABLET, FILM COATED ORAL at 06:35

## 2025-02-17 RX ADMIN — SEVELAMER CARBONATE 800 MG: 800 TABLET, FILM COATED ORAL at 09:09

## 2025-02-17 RX ADMIN — WARFARIN SODIUM 5 MG: 5 TABLET ORAL at 17:08

## 2025-02-17 RX ADMIN — ALBUTEROL SULFATE 2 PUFF: 108 INHALANT RESPIRATORY (INHALATION) at 03:46

## 2025-02-17 RX ADMIN — METOPROLOL SUCCINATE 12.5 MG: 25 TABLET, EXTENDED RELEASE ORAL at 09:09

## 2025-02-17 RX ADMIN — Medication 5 MG: at 20:47

## 2025-02-17 RX ADMIN — CALCITRIOL CAPSULES 0.25 MCG 0.25 MCG: 0.25 CAPSULE ORAL at 20:47

## 2025-02-17 RX ADMIN — MIDODRINE HYDROCHLORIDE 10 MG: 2.5 TABLET ORAL at 09:09

## 2025-02-17 RX ADMIN — PANTOPRAZOLE SODIUM 40 MG: 40 TABLET, DELAYED RELEASE ORAL at 09:09

## 2025-02-17 RX ADMIN — MIDODRINE HYDROCHLORIDE 10 MG: 2.5 TABLET ORAL at 17:08

## 2025-02-17 RX ADMIN — METOPROLOL SUCCINATE 12.5 MG: 25 TABLET, EXTENDED RELEASE ORAL at 20:49

## 2025-02-17 ASSESSMENT — ACTIVITIES OF DAILY LIVING (ADL)
ADLS_ACUITY_SCORE: 73
ADLS_ACUITY_SCORE: 67
ADLS_ACUITY_SCORE: 73
ADLS_ACUITY_SCORE: 67
ADLS_ACUITY_SCORE: 73
ADLS_ACUITY_SCORE: 73
ADLS_ACUITY_SCORE: 67
ADLS_ACUITY_SCORE: 67
ADLS_ACUITY_SCORE: 73
ADLS_ACUITY_SCORE: 73
ADLS_ACUITY_SCORE: 67
ADLS_ACUITY_SCORE: 73
ADLS_ACUITY_SCORE: 67
ADLS_ACUITY_SCORE: 73
ADLS_ACUITY_SCORE: 73
ADLS_ACUITY_SCORE: 67

## 2025-02-17 NOTE — PROGRESS NOTES
"Brief Palliative Care Sign Off Note  [facility]      Palliative Care was consulted for goals of care. After discussions with family/patient the plan of care is restorative. At this time the patient does not have any needs we are actively addressing so we will sign off. This was reviewed with Dr Slater.     However, we know a patient condition/situation may change -- please re-consult us if we can be helpful at any time in the future.      Thank you for the opportunity to be involved in the care of this patient.     Coby PARKER, CNS  Palliative Medicine   [facility]  Securely message with Vocera  \"Palliative Care [facility]\"       "

## 2025-02-17 NOTE — PLAN OF CARE
Date/Time: 2/16/25 [id number]     Summary: 65 y/o M w/PMH of ESRD (on PD), DM, GERD, TALI, gout, CAD, a fib (on warfarin), HLD, etc.  Transfer from [place] on 1/21 w/RLE pain w/ R leg and heel ulcer ongoing since 10/24. Has had R toes amputated, R fem pop, and multiple other procedures/interventions.  Orientation: A&Ox4  Behavior & Aggression: green  Activity: Ax2, lift; T&Rq2  Diet: low K+ diet  Fall risk: yes  Isolation: contact for MRSA  Pain: denies  B&B: incontinent of bowel, 1 BM this shift; pt does not produce much urine due to HD.  VS/O2: VSS, tachycardic.   IV: R& L PIV, SL; R CVC.  Drains/Devices: PD port.  Tele: N/A  Abnormal Labs: on Mg protocol, replaced & recheck in AM; trop 513, MD aware. BG checks, did not need coverage.  Skin: RLE incision sites CDI, pulses were doppled as ordered. R foot amputation ace wrap and gauze in place, CDI, black heel/malleolus wounds. R groin site w/ hematoma. Peritoneal site CDI. Doppler pulses intact.  Consults/Procedures: N/A  D/C Date: TBD  Other: pt feeling SOB earlier in the day, eval by MD with chest xray shows fluid in the lungs, will have poss tap tomorrow. PRN inhaler given with some relief. Will have HD axel as well.

## 2025-02-17 NOTE — PROGRESS NOTES
Renal Medicine Progress Note                                Arnulfo Pritchett MRN# [id number]   Age: 64 year old YOB: 1960   Date of Admission: 1/21/2025 Hospital LOS: 27                  Assessment/Plan:     1) End stage renal disease on PD  Chronic PD for last 3.5 years.  Home unit [facility], nephrologist Dr. May  Rx: 5 cycles, 2 L fill volumes, 9 hours, last fill 1.2 L with Extraneal.      Transitioned to iHD 02.15.25     2) Moderate R pleural effusion: Status post thoracentesis on 1/24 and 2/2.  Transudative  Chest x-ray on 2/5 shows small layering effusion on right side     3) PAD, right heel gangrene, occluded right SFA.  Status post rt fem - below knee popliteal bypass this admission     4) Postsurgical anemia in patient with ESRD-  -status post blood transfusion.  Hemoglobin 7-8.   -EPO 20,000 units 2/9/25.   Now that on hemo will dose in dialysis starting next run as long as he is in hosp.        Dialysis 02.18.25  Orders entered    Will need PD catheter out prior to discharge       Interval History:     Comfortable in bed  Follows    Dialyzed 02.15.25 without issue       ROS:     GENERAL: NAD, No fever,chills  CV: NEGATIVE for chest pain, palpitations  GI: NEGATIVE for abdominal pain, heartburn, nausea, vomiting or change in bowel pattern  ROS otherwise negative    Medications and Allergies:     Reviewed    Physical Exam:     Vitals were reviewed  Patient Vitals for the past 8 hrs:   BP Temp Temp src Pulse Resp SpO2 Weight   02/17/25 0752 117/88 97.8  F (36.6  C) Axillary 105 18 (!) 91 % --   02/17/25 0636 -- -- -- -- -- -- 77 kg (169 lb 12.1 oz)     Wt Readings from Last 4 Encounters:   02/17/25 77 kg (169 lb 12.1 oz)   01/21/25 75.8 kg (167 lb)   01/03/25 78.9 kg (174 lb)   01/02/25 78.9 kg (173 lb 14.4 oz)     I/O last 3 completed shifts:  In: 480 [P.O.:480]  Out: 225 [Urine:225]    Vitals:    02/14/25 1728 02/15/25 0553 02/15/25 1900 02/16/25 1530   Weight: 77.9 kg (171 lb 11.8  oz) 81.3 kg (179 lb 3.7 oz) 80 kg (176 lb 5.9 oz) 80.9 kg (178 lb 5.6 oz)    02/17/25 0636   Weight: 77 kg (169 lb 12.1 oz)         GENERAL: awake, alert, follows  RESP:  clear anteriorly  CV: RRR, normal S1 S2    Data:     Recent Labs   Lab 02/17/25  0707 02/16/25  2045 02/16/25  1641 02/16/25  1609 02/16/25  0749 02/16/25  0308 02/15/25  1640 02/15/25  1552 02/15/25  0731 02/15/25  0600   *  --   --   --   --  130*  --  132*  --  128*   POTASSIUM 5.4*  --   --   --   --  4.6  --  4.3  --  6.0*   CHLORIDE 92*  --   --   --   --  93*  --  94*  --  90*   CO2 24  --   --   --   --  26  --  28  --  25   ANIONGAP 13  --   --   --   --  11  --  10  --  13   * 114* 139* 162*   < > 108*   < > 126*   < > 116*   BUN 42.2*  --   --   --   --  28.5*  --  20.8  --  54.6*   CR 4.18*  --   --   --   --  3.03*  --  2.48*  --  5.23*   GFRESTIMATED 15*  --   --   --   --  22*  --  28*  --  11*   TERENCE 10.3  --   --   --   --  9.6  --  9.1  --  9.6    < > = values in this interval not displayed.         Recent Labs   Lab 02/17/25  0707   *   POTASSIUM 5.4*   CHLORIDE 92*   CO2 24   ANIONGAP 13   *   BUN 42.2*   CR 4.18*   GFRESTIMATED 15*   TERENCE 10.3     Recent Labs   Lab 02/17/25  0707 02/16/25  0308   POTASSIUM 5.4* 4.6     Recent Labs   Lab 02/17/25  0707 02/16/25  0308 02/15/25  1829 02/15/25  0900 02/15/25  0600 02/14/25  0723   PHOS 5.4* 4.1  --   --  5.6* 5.5*   HGB 8.8* 7.9* 8.3* 8.0*  --  8.4*         G Boo Ray MD    [facility] - Nephrology  [phone]

## 2025-02-17 NOTE — PROGRESS NOTES
[facility]  Cardiology Progress Note  Date of Service: 02/17/2025  Primary Cardiologist: Dr. Regan Brown & Plan    Arnulfo Pritchett is a 64 year old male with past medical history significant for ESRD on peritoneal dialysis, diabetes mellitus on insulin, GERD, obstructive sleep apnea, gout, coronary artery disease with multiple stents,chronic paroxysmal A-fib (s/p ablation ) , Chronic severe HFrEF, ischemic cardiomyopathy, not in acute exacerbation (20-25%),  hyperlipidemia, peripheral artery disease status left common femoral endarterectomy and a left common femoral artery to tibioperoneal trunk bypass on 25 October 2024 and Follow-up VLADIMIR Doppler on 1/6/2025 noted critical resting arterial insufficiency of the right lower extremity, renal cell carcinoma status nephrectomy admitted on 1/21/2025 with right leg ulcer along with right heel ulcer and transferred to [facility] for vascular surgery evaluation on 1/21/2025. Cardiology was consulted for non-sustained ventricular tachycardia    Assessment:  Non-sustained VT in setting of chronic ischemic cardiomyopathy  15 beat run, asymptomatic  History of CAD with inferior STEMI in 2017 s/p PCI of RCA and subsequent PCI of OM and Lcx in 2021. Angiogram from 11/2024 showed moderate to severe disease in proximal RCA and mid-distal circumflex however intervention was deferred given recurrent hypotension, severe anemia requiring transfusion, and lack of symptoms  Chronic ischemic cardiomyopathy  ESRD, previously on PD now on HD  Paroxysmal atrial fibrillation with history of CVA  Anemia  Insulin dependent diabetes  Hypotension, on Midodrine    Plan:   Continue Toprol 12.5 mg twice daily  Replete electrolytes to keep K > 4 and Mag > 2  14 day Zio monitor at discharge, if recurrence of ventricular tachycardia could consider PCI of RCA and circumflex.   Outpatient cardiology will be arranged.     Renee Palm CNP  [facility]  Pager:  [phone]      Interval History   No acute events overnight. No chest pain, pressure, or shortness of breath. No palpitations, lightheaded or dizziness.     Physical Exam   Temp: 97.8  F (36.6  C) Temp src: Axillary BP: 117/88 Pulse: 105   Resp: 18 SpO2: (!) 91 % O2 Device: None (Room air)    Vitals:    02/15/25 1900 02/16/25 1530 02/17/25 0636   Weight: 80 kg (176 lb 5.9 oz) 80.9 kg (178 lb 5.6 oz) 77 kg (169 lb 12.1 oz)     Older adult male in no apparent distress. Heart rate and rhythm are regular, no murmur, rub, or gallop. Lung sounds are clear, no rales, rhonchi, or wheezes.     Clinically Significant Risk Factors        # Hyperkalemia: Highest K = 5.4 mmol/L in last 2 days, will monitor as appropriate  # Hyponatremia: Lowest Na = 129 mmol/L in last 2 days, will monitor as appropriate  # Hypochloremia: Lowest Cl = 92 mmol/L in last 2 days, will monitor as appropriate      # Hypoalbuminemia: Lowest albumin = 1.6 g/dL at 2/12/2025  7:26 AM, will monitor as appropriate     # Hypertension: Noted on problem list  # Chronic heart failure with reduced ejection fraction: last echo with EF <40%          # DMII: A1C = 6.6 % (Ref range: <5.7 %) within past 6 months    # Severe Malnutrition: based on nutrition assessment    # Financial/Environmental Concerns:            Medications   Current Facility-Administered Medications   Medication Dose Route Frequency Provider Last Rate Last Admin    dextrose 10% infusion   Intravenous Continuous PRN Walter Dempsey MD        Patient is already receiving anticoagulation with heparin, enoxaparin (LOVENOX), warfarin (COUMADIN)  or other anticoagulant medication   Does not apply Continuous PRN Walter Dempsey MD         Current Facility-Administered Medications   Medication Dose Route Frequency Provider Last Rate Last Admin    - MEDICATION INSTRUCTIONS for Dialysis Patients -   Does not apply See Admin Instructions Walter Dempsey MD        acetaminophen (TYLENOL) tablet  975 mg  975 mg Oral Q8H Alyce Salas DPM   975 mg at 02/17/25 0635    [Held by provider] allopurinol (ZYLOPRIM) tablet 200 mg  200 mg Oral QPM Walter Dempsey MD   200 mg at 01/26/25 1957    atorvastatin (LIPITOR) tablet 40 mg  40 mg Oral or Feeding Tube At Bedtime Walter Dempsey MD   40 mg at 02/16/25 2022    calcitRIOL (ROCALTROL) capsule 0.25 mcg  0.25 mcg Oral At Bedtime Walter Dempsey MD   0.25 mcg at 02/16/25 2022    cinacalcet (SENSIPAR) tablet 60 mg  60 mg Oral Q Mon Wed Fri AM Walter Dempsey MD   60 mg at 02/14/25 2131    clopidogrel (PLAVIX) tablet 75 mg  75 mg Oral or Feeding Tube QPM Walter Dempsey MD   75 mg at 02/16/25 2023    [Held by provider] insulin aspart (NovoLOG) injection (RAPID ACTING)   Subcutaneous TID w/meals Walter Dempsey MD   2 Units at 02/12/25 1700    insulin aspart (NovoLOG) injection (RAPID ACTING)  1-7 Units Subcutaneous TID AC Walter Dempsey MD   1 Units at 02/17/25 1242    insulin aspart (NovoLOG) injection (RAPID ACTING)  1-5 Units Subcutaneous At Bedtime Walter Dempsey MD   1 Units at 02/07/25 0040    [Held by provider] insulin glargine (LANTUS PEN) injection 10 Units  10 Units Subcutaneous Daily Ashley Roper MD        melatonin tablet 5 mg  5 mg Oral At Bedtime Walter Dempsey MD   5 mg at 02/16/25 2226    metoprolol succinate ER (TOPROL-XL) 24 hr half-tab 12.5 mg  12.5 mg Oral BID Viarj Chavez MD   12.5 mg at 02/17/25 0909    midodrine (PROAMATINE) tablet 10 mg  10 mg Oral BID w/meals Walter Dempsey MD   10 mg at 02/17/25 0909    multivitamin RENAL (TRIPHROCAPS) capsule 1 capsule  1 capsule Oral Daily Walter Dempsey MD   1 capsule at 02/17/25 0908    pantoprazole (PROTONIX) EC tablet 40 mg  40 mg Oral QAM AC Carolina Alejandre, formerly [facility]   40 mg at 02/17/25 0909    polyethylene glycol (MIRALAX) Packet 17 g  17 g Oral or NG Tube Daily Walter Dempsey MD   17 g at 02/14/25 0813    senna-docusate (SENOKOT-S/PERICOLACE)  8.6-50 MG per tablet 1 tablet  1 tablet Oral BID Alyce Salas DPM   1 tablet at 02/16/25 2022    sevelamer carbonate (RENVELA) tablet 800 mg  800 mg Oral TID w/meals Walter Dempsey MD   800 mg at 02/17/25 1237    sodium chloride (PF) 0.9% PF flush 3 mL  3 mL Intracatheter Q8H Walter Dempsey MD   3 mL at 02/17/25 1243    warfarin ANTICOAGULANT (COUMADIN) tablet 5 mg  5 mg Oral ONCE at 18:00 Melissa Macias MD        Warfarin Dose Required Daily - Pharmacist Managed  1 each Oral See Admin Instructions Ashley Roper MD

## 2025-02-17 NOTE — PROGRESS NOTES
VASCULAR SURGERY    Patient is very well-known to us from his vascular issues.  He had been on peritoneal dialysis but converted over to hemodialysis with a tunneled catheter.    I spoke with Dr. Ray who is following him from nephrology.  They would like us to remove his abdominal tunneled catheter.  Will arrange to have this done in the operating room under local anesthetic in the relatively near future.       Patrick Hein MD

## 2025-02-17 NOTE — PROGRESS NOTES
Care Management Follow Up    Length of Stay (days): 27    Expected Discharge Date: 02/18/2025     Concerns to be Addressed: discharge planning     Patient plan of care discussed at interdisciplinary rounds: Yes    Anticipated Discharge Disposition: Skilled Nursing Facility              Anticipated Discharge Services:    Anticipated Discharge DME:      Patient/family educated on Medicare website which has current facility and service quality ratings:    Education Provided on the Discharge Plan:    Patient/Family in Agreement with the Plan: yes    Referrals Placed by CM/SW:    Private pay costs discussed: Not applicable    Discussed  Partnership in Safe Discharge Planning  document with patient/family: No     Handoff Completed: Yes, [facility] PCP: Internal handoff referral completed    Additional Information:    SW notified by RN that patient will likely need a TCU with dialysis on site due to lack of money for transportation. SW noted that patient has already been declined from [facility] and [facility]. GEENA sent referral to [facility].    Add 1337: [place] requested patients SSNSandra Paula at [facility] messaged SW inquiring if patient was hemodialysis or PD. SW notified paula that pt is starting hemodialysis and his PD will be taken out before discharge. SW waiting to hear bed availability.     Next Steps: secure TCU bed    MICHAEL Amin

## 2025-02-17 NOTE — PROGRESS NOTES
[facility]    Medicine Progress Note - Hospitalist Service    Date of Admission:  1/21/2025    Assessment & Plan       Arnulfo Pritchett is a 64 year old male with past medical history of severe heart failure (EF 30%), ESRD (on peritoneal dialysis), chronic paroxysmal AF on warfarin, LLE PAD s/p endarterectomy, RCC s/p nephrectomy admitted on 1/21/2025 for septic shock secondary to right leg PAD with worsening right heel necrotic ulcer. The patient was seen in ED at outside hospital on 1/3/25 and diagnosed with pneumonia, later completing course of Cefdinir. He then presented on 1/21/25 to outside hospital for right leg pain and found to have right leg ulcer and transferred to [facility] for vascular surgery evaluation. Now s/p right common femoral BK popliteal/tibial endarterectomy and right common femoral to below-knee popliteal/tibial PTFE bypass performed on 1/27/25. The patient was admitted to the ICU for requirement of mechanical ventilation and pressor support following surgery for multifactorial shock.       Hospitalist service consulted 1/31/2025 for transfer out of ICU and to assist with medical management along with vascular surgery, podiatry and Infectious disease.     Hx PAD of LLE s/p endarterectomy and fem-tibioperoneal trunk bypass on 10/25/24  Hx RLE arterial occlusion s/p SFA angioplasty and stent 5/2024  PAD RLE, right heel gangrene, occluded SFA, no evidence of osteomyelitis  s/p right common femoral and popliteal/tibial endarterectomy, right femoral to popliteal/tibial PTFE bypass 1/27/25   -Found to have critical limb ischemia on admission on January 21, for details see admission note.   -Distributive shock following surgery followed by the ICU service until 1/31.  -Followed by vascular surgery, podiatry, wound care, and ID.  -Coumadin has been held since admission, was was started on IV heparin drip since admission.  -Restarted warfarin on 2/14/2025 after discussion with  vascular surgery.  INR now in goal range, heparin gtt discontinued.  -Has been maintained on statin and Plavix 75 mg daily during this hospital stay.  -Recent IV piperacillin/tazobactam (Zosyn), discontinued 2/2 per ID after 12 days of Zosyn; follow-up cultures, monitor off antibiotics.  -Wound care per surgery and WOC.  -pain control, see hospital orders.  -worsening ischemic changes in 2nd-5th toes of right foot.  Podiatry and vascular surgery following.  -Underwent transmetatarsal amputation on 2/11/2025.  Tolerated procedure well.  Intraoperatively no signs of infection.  No further surgery per podiatry.  -Leukocytosis improved postsurgery.  Hemoglobin trended down to 7 on 2/13/2025 requiring transfusion, Hgb stable since then.     Multifactorial shock, resolved  ICU admission, weaned off vasopressors 1/30.  -Transitioned to midodrine with increase in prior to admission dose on1/31.  -Monitor vitals     End-stage renal disease (ESRD), on peritoneal dialysis  Hx of secondary hyperparathyroidism and hyperphosphatemia, phosphorous elevated above baseline of 6 at most recent of 7.9  Nephrology following.  -Chronic peritoneal dialysis for the last 3.5 years. Patient expressed interest to switch to HD. Nephrology discussed with his primary nephrologist who agrees to transition to hemodialysis.  Underwent right tunneled IJ catheter placement by IR on 2/12/2025.  Now undergoing HD on MWF schedule.  - On 2/16, patient having some episodes of shortness of breath that last 30-60 seconds. ECG without any changes concerning for ACS. CXR shows moderate partially loculated R pleural effusion which is the same to slightly worse from his last CXR. Currently satting well on room air. Will restart albuterol inhaler that patient was using outpatient after his recent pneumonia and monitor for improvement after dialysis tomorrow, possible more volume needs to be taken off during HD. If no improvement with SOB after dialysis run  tomorrow, would place IR consult for repeat thoracentesis.  -Continue midodrine.  -PD catheter will be removed by vascular surgery today     Acute hypoxemic respiratory failure, resolved  Right pleural effusion  Status post thoracentesis 1/24/25, repeat 2/2/25  -Following surgery on admission by vascular surgery was intubated.  Recent pneumonia treated with 3 weeks of Ceftin prior to this admission. Extubated 1/29/25  -Right pleural effusion.  Status post thoracentesis 1/24/25 with 1.5 L clear yellow fluid removed, likely transudative based on low cell count of 117, , and protein of 1.7. Possibly secondary to reduced peritoneal dialysis during hospitalization vs underlying severe HFrEF (30%).  -CT chest 2/1/25, see report, no clear pneumonia; large right pleural effusion noted.  -Now off oxygen. Encourage incentive spirometry.  -Antibiotics as above, discontinued by ID 2/2  -ID consulted 2/1 as above, recommend monitoring patient off antibiotics  -S/p repeat Right thoracentesis on 2/2 with 2.3 L of rolan fluid removed; additional studies, cultures (negative to date), cytology (negative for malignancy)      Paroxysmal atrial fibrillation  Chronic anticoagulation prior to admission, warfarin - ON HOLD  Ischemic cardiomyopathy with HLD, CAD s/p multiple stents, and severe HFrEF (30%)   Troponin elevation, likely type 2 MI  Wide complex tachycardia 2/1/25  Assessment-postoperatively has been maintained on heparin drip.  Coumadin has been held during his hospital stay.  -Echo 1/31-EF 25 to 30%.  Severe global hypokinesis with abnormal septal motion consistent with conduction abnormality.  Severe inferior wall hypokinesis.  LVH.  RV mildly dilated and mildly reduced RV function.  Mild mitral regurgitation.  Mild to moderate mitral stenosis.  Moderate aortic stenosis.  Left ventricular fairly pressures elevated.  -Episode of wide-complex tachycardia noted by nursing staff, 15 beats, up on 2/1/2025.  -Continue  inpatient telemetry.  -EP consulted on 2/16 given tachycardia with rates to 120s. They evaluated patient and said not unexpected given patient's severe HFrEF.              - Recommended increasing Toprol XL to 12.5 mg BID (PTA was on 25 mg daily)  -Troponin elevated to 513 on 2/16, checked for report of shortness of breath and new incomplete RBBB on ECG on 2/16. -Coumadin restarted on 2/14/2025. INR in goal range, so heparin gtt discontinued now.     Anemia of chronic disease  Baseline around 10.  Has been stable in the 8 range. Did need 1 U PRBC on 2/8 and 2/13 for Hgb<7 without signs of active bleeding.  -Nephrology started patient on EPO on 2/9 and to continue weekly.  -Continue to monitor hemoglobin daily,   -     Hyperglycemia  Type 2 diabetes   hemoglobin A1c 5.7% October 24  PTA regimen: Lantus 35 units at bedtime  Insulin requirements have been decreasing and he started having hypoglycemia episodes after being started on HD on 2/13/2025  Discontinue Lantus and carb coverage  Continue sliding scale insulin. Discharge insulin dosing dependent on requirements from sliding scale.  Continue to monitor FSBS     Mixed anion gap acidosis, resolved  -Improved respiratory function, monitor.  Anion gap resolved 1/31.     Abdominal discomfort 1/30, resolved  Noted to have some right upper quadrant pain 1/30.  Now resolved.  LFTs normal.  Was on Zosyn for above surgical issues.   -Monitor abdominal exam.  -Antibiotics managed as noted above.     Moderate protein calorie nutrition  -Nutrition consulted.  -Speech and language therapy (SLP) consulted, diet per speech therapy.     Gout  -PTA allopurinol currently on hold, reassess daily.     History of renal cell carcinoma   -Status post right nephrectomy, monitor.     History of obstructive sleep apnea.  By report, intolerant of CPAP   -Monitor, follow-up with outpatient provider.     Weakness and deconditioning  -PT, OT, speech and language therapy (SLP)  "consulted.  -Increase activity as tolerated.  -Currently TCU recommended. If respiratory status improved and patient feeling better after HD run tomorrow, would be medically ready for discharge to TCU if cleared by vascular surgery as well.     Disposition  Anticipated discharge timing see Disposition Plan below.  -Anticipated discharge to transitional care unit.  -Social work consulted for discharge planning.     Goals of care discussion:  Patient had mentioned tonursing staff about \"wanting to die\", aand also mentioned to prior hospitalist he is tired of living like this and he did not think he was going to make it out of the hospital. Palliative consulted. When palliative saw patient the next day, patient appeared to be in a better mood and stated he wanted to continue restorative cares. He has however mentioned to Nephrology that he will like to switch from peritoneal dialysis to HD as it will make him feel less fatigued , if however HD does not help, then he would consider comfort measures.   Currently patient's goals are restorative and palliative has signed off               Diet: Combination Diet Regular Diet; 3 gm K Diet (low potassium)  Snacks/Supplements Adult: Ensure Enlive; Between Meals    DVT Prophylaxis: Warfarin  Rosario Catheter: Not present  Lines: PRESENT      CVC Double Lumen Right Internal jugular Non - valved (open ended);Tunneled-Site Assessment: WDL      Cardiac Monitoring: None  Code Status: Full Code      Clinically Significant Risk Factors        # Hyperkalemia: Highest K = 5.4 mmol/L in last 2 days, will monitor as appropriate  # Hyponatremia: Lowest Na = 129 mmol/L in last 2 days, will monitor as appropriate  # Hypochloremia: Lowest Cl = 92 mmol/L in last 2 days, will monitor as appropriate      # Hypoalbuminemia: Lowest albumin = 1.6 g/dL at 2/12/2025  7:26 AM, will monitor as appropriate     # Hypertension: Noted on problem list  # Chronic heart failure with reduced ejection fraction: " last echo with EF <40%          # DMII: A1C = 6.6 % (Ref range: <5.7 %) within past 6 months    # Severe Malnutrition: based on nutrition assessment    # Financial/Environmental Concerns:           Social Drivers of Health    Tobacco Use: Medium Risk (2/11/2025)    Patient History     Smoking Tobacco Use: Former     Smokeless Tobacco Use: Never   Alcohol Use: Unknown (11/28/2018)    [facility] from [facility]    AUDIT-C     Frequency of Alcohol Consumption: Monthly or less     Frequency of Binge Drinking: Never   Transportation Needs: High Risk (1/21/2025)    Transportation Needs     Within the past 12 months, has lack of transportation kept you from medical appointments, getting your medicines, non-medical meetings or appointments, work, or from getting things that you need?: Yes   Physical Activity: Unknown (10/8/2024)    Exercise Vital Sign     Days of Exercise per Week: 0 days   Social Connections: Unknown (10/8/2024)    Social Connection and Isolation Panel [NHANES]     Frequency of Social Gatherings with Friends and Family: Patient declined          Disposition Plan     Medically Ready for Discharge: Anticipated Tomorrow             Elizabeth Slater MD  Hospitalist Service  [facility]  Securely message with [employer] (more info)  Text page via [employer] Paging/Directory   ______________________________________________________________________    Interval History   No acute overnight events.  Patient hemodynamically stable.   Catheter will be removed by vascular surgery today evening    Denies chest pain  Denies shortness of breath          Physical Exam   Vital Signs: Temp: 97.8  F (36.6  C) Temp src: Axillary BP: 117/88 Pulse: 105   Resp: 18 SpO2: (!) 91 % O2 Device: None (Room air)    Weight: 169 lbs 12.07 oz    Physical Exam  Constitutional:       General: He is not in acute distress.  Pulmonary:       Effort: Pulmonary effort is normal. No respiratory distress.      Breath sounds: No wheezing or rales.   Abdominal:      General: There is no distension.      Palpations: Abdomen is soft.      Tenderness: There is no abdominal tenderness.   Neurological:      Mental Status: He is alert.          Medical Decision Making       45 MINUTES SPENT BY ME on the date of service doing chart review, history, exam, documentation & further activities per the note.      Data     I have personally reviewed the following data over the past 24 hrs:    13.6 (H)  \   8.8 (L)   / 434     129 (L) 92 (L) 42.2 (H) /  170 (H)   5.4 (H) 24 4.18 (H) \     Trop: 515 (HH) BNP: N/A     INR:  2.17 (H) PTT:  N/A   D-dimer:  N/A Fibrinogen:  N/A       Imaging results reviewed over the past 24 hrs:   No results found for this or any previous visit (from the past 24 hours).

## 2025-02-17 NOTE — PLAN OF CARE
Goal Outcome Evaluation:     Date/Time: 2/16/25 1900-0730     Summary: 65 y/o M w/PMH of ESRD (on PD), DM, GERD, TALI, gout, CAD, a fib (on warfarin), HLD, etc.  Transfer from [place] on 1/21 w/RLE pain w/ R leg and heel ulcer ongoing since 10/24. Has had R toes amputated, R fem pop, and multiple other procedures/interventions.  Orientation: A&Ox4  Behavior & Aggression: green  Activity: Ax2, lift; T&Rq2  Diet: low K+ diet  Fall risk: yes  Isolation: contact for MRSA  Pain: denies  B&B: incontinent of bowel; pt does not produce much urine due to HD.  VS/O2: VSS on RA, tachycardic.   IV: R& L PIV, SL; R CVC.  Drains/Devices: PD port.  Tele: N/A  Abnormal Labs: on Mg protocol, replaced & recheck in AM; trop 515,   BG checks, did not need coverage.  Skin: RLE incision sites CDI, pulses were doppled as ordered. R foot amputation ace wrap and gauze in place, CDI, black heel/malleolus wounds. R groin site w/ hematoma. Peritoneal site CDI. Doppler pulses intact.  Consults/Procedures: N/A  D/C Date: Anticipated Tomorrow or Tuesday pending respiratory status after HD tomorrow, possible thoracentesis if needed, and vascular surgery clearance. Will also need TCU placement       Other: PRN inhaler given x1. Will have HD today.

## 2025-02-18 LAB
ANION GAP SERPL CALCULATED.3IONS-SCNC: 14 MMOL/L (ref 7–15)
ATRIAL RATE - MUSE: 111 BPM
ATRIAL RATE - MUSE: 99 BPM
BACTERIA TISS BX CULT: NORMAL
BUN SERPL-MCNC: 51.8 MG/DL (ref 8–23)
CALCIUM SERPL-MCNC: 9.5 MG/DL (ref 8.8–10.4)
CHLORIDE SERPL-SCNC: 90 MMOL/L (ref 98–107)
CREAT SERPL-MCNC: 4.98 MG/DL (ref 0.67–1.17)
DIASTOLIC BLOOD PRESSURE - MUSE: NORMAL MMHG
DIASTOLIC BLOOD PRESSURE - MUSE: NORMAL MMHG
EGFRCR SERPLBLD CKD-EPI 2021: 12 ML/MIN/1.73M2
GLUCOSE BLDC GLUCOMTR-MCNC: 103 MG/DL (ref 70–99)
GLUCOSE BLDC GLUCOMTR-MCNC: 114 MG/DL (ref 70–99)
GLUCOSE BLDC GLUCOMTR-MCNC: 130 MG/DL (ref 70–99)
GLUCOSE BLDC GLUCOMTR-MCNC: 86 MG/DL (ref 70–99)
GLUCOSE BLDC GLUCOMTR-MCNC: 97 MG/DL (ref 70–99)
GLUCOSE SERPL-MCNC: 144 MG/DL (ref 70–99)
HCO3 SERPL-SCNC: 23 MMOL/L (ref 22–29)
INR PPP: 3.35 (ref 0.85–1.15)
INTERPRETATION ECG - MUSE: NORMAL
INTERPRETATION ECG - MUSE: NORMAL
LACTATE SERPL-SCNC: 0.6 MMOL/L (ref 0.7–2)
MAGNESIUM SERPL-MCNC: 2.4 MG/DL (ref 1.7–2.3)
P AXIS - MUSE: 59 DEGREES
P AXIS - MUSE: 91 DEGREES
PHOSPHATE SERPL-MCNC: 5.6 MG/DL (ref 2.5–4.5)
POTASSIUM SERPL-SCNC: 6 MMOL/L (ref 3.4–5.3)
PR INTERVAL - MUSE: 190 MS
PR INTERVAL - MUSE: 198 MS
QRS DURATION - MUSE: 106 MS
QRS DURATION - MUSE: 106 MS
QT - MUSE: 344 MS
QT - MUSE: 364 MS
QTC - MUSE: 467 MS
QTC - MUSE: 467 MS
R AXIS - MUSE: -89 DEGREES
R AXIS - MUSE: 266 DEGREES
SODIUM SERPL-SCNC: 127 MMOL/L (ref 135–145)
SYSTOLIC BLOOD PRESSURE - MUSE: NORMAL MMHG
SYSTOLIC BLOOD PRESSURE - MUSE: NORMAL MMHG
T AXIS - MUSE: 84 DEGREES
T AXIS - MUSE: 87 DEGREES
VENTRICULAR RATE- MUSE: 111 BPM
VENTRICULAR RATE- MUSE: 99 BPM

## 2025-02-18 PROCEDURE — 36415 COLL VENOUS BLD VENIPUNCTURE: CPT | Performed by: HOSPITALIST

## 2025-02-18 PROCEDURE — 250N000011 HC RX IP 250 OP 636: Performed by: INTERNAL MEDICINE

## 2025-02-18 PROCEDURE — 83605 ASSAY OF LACTIC ACID: CPT | Performed by: STUDENT IN AN ORGANIZED HEALTH CARE EDUCATION/TRAINING PROGRAM

## 2025-02-18 PROCEDURE — 250N000013 HC RX MED GY IP 250 OP 250 PS 637: Performed by: INTERNAL MEDICINE

## 2025-02-18 PROCEDURE — 634N000001 HC RX 634: Mod: JZ | Performed by: INTERNAL MEDICINE

## 2025-02-18 PROCEDURE — 84100 ASSAY OF PHOSPHORUS: CPT | Performed by: INTERNAL MEDICINE

## 2025-02-18 PROCEDURE — 258N000003 HC RX IP 258 OP 636: Performed by: INTERNAL MEDICINE

## 2025-02-18 PROCEDURE — 90935 HEMODIALYSIS ONE EVALUATION: CPT | Performed by: INTERNAL MEDICINE

## 2025-02-18 PROCEDURE — 250N000013 HC RX MED GY IP 250 OP 250 PS 637

## 2025-02-18 PROCEDURE — 90937 HEMODIALYSIS REPEATED EVAL: CPT

## 2025-02-18 PROCEDURE — 85610 PROTHROMBIN TIME: CPT | Performed by: HOSPITALIST

## 2025-02-18 PROCEDURE — 82310 ASSAY OF CALCIUM: CPT | Performed by: INTERNAL MEDICINE

## 2025-02-18 PROCEDURE — 250N000013 HC RX MED GY IP 250 OP 250 PS 637: Performed by: PODIATRIST

## 2025-02-18 PROCEDURE — 83735 ASSAY OF MAGNESIUM: CPT | Performed by: INTERNAL MEDICINE

## 2025-02-18 PROCEDURE — 99232 SBSQ HOSP IP/OBS MODERATE 35: CPT | Performed by: STUDENT IN AN ORGANIZED HEALTH CARE EDUCATION/TRAINING PROGRAM

## 2025-02-18 PROCEDURE — 120N000001 HC R&B MED SURG/OB

## 2025-02-18 PROCEDURE — 80048 BASIC METABOLIC PNL TOTAL CA: CPT | Performed by: INTERNAL MEDICINE

## 2025-02-18 RX ADMIN — HEPARIN SODIUM 1600 UNITS: 1000 INJECTION INTRAVENOUS; SUBCUTANEOUS at 09:18

## 2025-02-18 RX ADMIN — OXYCODONE HYDROCHLORIDE 2.5 MG: 5 TABLET ORAL at 01:49

## 2025-02-18 RX ADMIN — HEPARIN SODIUM 1600 UNITS: 1000 INJECTION INTRAVENOUS; SUBCUTANEOUS at 09:19

## 2025-02-18 RX ADMIN — CALCITRIOL CAPSULES 0.25 MCG 0.25 MCG: 0.25 CAPSULE ORAL at 20:05

## 2025-02-18 RX ADMIN — METHOCARBAMOL 500 MG: 500 TABLET ORAL at 01:48

## 2025-02-18 RX ADMIN — SEVELAMER CARBONATE 800 MG: 800 TABLET, FILM COATED ORAL at 12:03

## 2025-02-18 RX ADMIN — SEVELAMER CARBONATE 800 MG: 800 TABLET, FILM COATED ORAL at 17:38

## 2025-02-18 RX ADMIN — ACETAMINOPHEN 975 MG: 325 TABLET, FILM COATED ORAL at 06:48

## 2025-02-18 RX ADMIN — CLOPIDOGREL BISULFATE 75 MG: 75 TABLET ORAL at 20:05

## 2025-02-18 RX ADMIN — ATORVASTATIN CALCIUM 40 MG: 40 TABLET, FILM COATED ORAL at 20:05

## 2025-02-18 RX ADMIN — SEVELAMER CARBONATE 800 MG: 800 TABLET, FILM COATED ORAL at 07:39

## 2025-02-18 RX ADMIN — SODIUM CHLORIDE 500 ML: 9 INJECTION, SOLUTION INTRAVENOUS at 09:17

## 2025-02-18 RX ADMIN — ACETAMINOPHEN 975 MG: 325 TABLET, FILM COATED ORAL at 22:11

## 2025-02-18 RX ADMIN — Medication 1 CAPSULE: at 12:03

## 2025-02-18 RX ADMIN — EPOETIN ALFA-EPBX 10000 UNITS: 10000 INJECTION, SOLUTION INTRAVENOUS; SUBCUTANEOUS at 09:16

## 2025-02-18 RX ADMIN — MIDODRINE HYDROCHLORIDE 10 MG: 2.5 TABLET ORAL at 07:39

## 2025-02-18 RX ADMIN — ONDANSETRON 4 MG: 2 INJECTION, SOLUTION INTRAMUSCULAR; INTRAVENOUS at 17:38

## 2025-02-18 RX ADMIN — PANTOPRAZOLE SODIUM 40 MG: 40 TABLET, DELAYED RELEASE ORAL at 07:39

## 2025-02-18 RX ADMIN — Medication 5 MG: at 22:12

## 2025-02-18 RX ADMIN — MIDODRINE HYDROCHLORIDE 10 MG: 2.5 TABLET ORAL at 17:38

## 2025-02-18 ASSESSMENT — ACTIVITIES OF DAILY LIVING (ADL)
ADLS_ACUITY_SCORE: 73
ADLS_ACUITY_SCORE: 71
ADLS_ACUITY_SCORE: 73
ADLS_ACUITY_SCORE: 73
ADLS_ACUITY_SCORE: 71
ADLS_ACUITY_SCORE: 73
ADLS_ACUITY_SCORE: 71
ADLS_ACUITY_SCORE: 73
ADLS_ACUITY_SCORE: 73
ADLS_ACUITY_SCORE: 71
ADLS_ACUITY_SCORE: 73
ADLS_ACUITY_SCORE: 71
ADLS_ACUITY_SCORE: 71
ADLS_ACUITY_SCORE: 73
ADLS_ACUITY_SCORE: 71
ADLS_ACUITY_SCORE: 71
ADLS_ACUITY_SCORE: 73
ADLS_ACUITY_SCORE: 71
ADLS_ACUITY_SCORE: 71

## 2025-02-18 NOTE — PROGRESS NOTES
[facility]  PROGRESS NOTE    CHIEF COMPLAINT:      I was asked by Milo Carrion MD  to evaluate this patient for right foot.    PATIENT HISTORY:  Arnulfo Pritchett is a 64 year old male seen in follow up for his right foot. Had a TMA on 2/11. Complicated admission, now changed to HD and plans to have PD catheter removed. Seen while in dialysis.         Review of Systems:  A 10 point review of systems was performed and is positive for that noted above in the patient history.  All other areas are negative.     PAST MEDICAL HISTORY:   Past Medical History:   Diagnosis Date    CAD (coronary artery disease)     Chronic systolic heart failure (H)     ESRD (end stage renal disease) on dialysis (H)     Gastroesophageal reflux disease without esophagitis     Gout     HLD (hyperlipidemia)     TALI (obstructive sleep apnea)     PAD (peripheral artery disease)     S/p RLE stenting of SFA/popliteal arteries    Type 2 diabetes mellitus with diabetic neuropathy (H)         PAST SURGICAL HISTORY:   Past Surgical History:   Procedure Laterality Date    AMPUTATE FOOT Left 2/11/2025    Procedure: Right foot transmetatarsal amputation;  Surgeon: Alyce Salas DPM;  Location: [facility] OR    AMPUTATE TOE(S) Left 10/27/2024    Procedure: AMPUTATION, TOE left great toe;  Surgeon: Haley Joseph DPM, Podiatry/Foot and Ankle Surgery;  Location: [facility] OR    BYPASS GRAFT FEMOROPOPLITEAL Right 1/27/2025    Procedure: RIGHT FEMORAL ARTERY TO RIGHT POPLITEAL ARTERY ENDARTERECTOMY,  COMMON FEMORAL TO POPLITEAL BELOW KNEE COMPOSITE GREATER SAPHENOUS/PTFE BYPASS GRAFT. GREATER SAPHENOUS VEIN HARVEST;  Surgeon: Patrick Hein MD;  Location: [facility] OR    BYPASS GRAFT FEMOROTIBIAL Left 10/25/2024    Procedure: LEFT FEMORAL ENDARTERECTOMY, LEFT FEMORAL TO TIBIAL PERONEAL TRUNK BYPASS WITH LEFT GREAT SEPHANEOUS VEIN, WOUND VAC PLACEMENT ON LEFT GROIN.;  Surgeon: Raul Gray MD;  Location: [facility] OR    BYPASS GRAFT  FEMOROTIBIAL Left 10/27/2024    Procedure: CREATION, BYPASS, VASCULAR, FEMORAL TO TIBIAL REVISION OF BYPASS LEFT COMMON FEMORAL TO TIBIAL.;  Surgeon: Raul Gray MD;  Location:  OR    CV CORONARY ANGIOGRAM N/A 11/27/2024    Procedure: Coronary Angiogram;  Surgeon: Clem Matt MD;  Location:  [facility]    CV LOWER EXTREMITY ANGIOGRAM LEFT Left 10/27/2024    Procedure: Angiogram Lower Extremity Left;  Surgeon: Raul Gray MD;  Location:  OR    CV PCI N/A 11/27/2024    Procedure: Percutaneous Coronary Intervention;  Surgeon: Clem Matt MD;  Location:  [facility]    IR CVC TUNNEL PLACEMENT > 5 YRS OF AGE  2/12/2025    IR LOWER EXTREMITY ANGIOGRAM LEFT  10/17/2024    IR LOWER EXTREMITY ANGIOGRAM RIGHT  1/23/2025    OR ANGIOGRAM, LOWER EXTREMITY Right 1/27/2025    Procedure: INTRAOPERATIVE ANGIOGRAM;  Surgeon: Patrick Hein MD;  Location:  OR        MEDICATIONS:  Reviewed in Epic. Current.     ALLERGIES:  No Known Allergies     SOCIAL HISTORY:   Social History     Socioeconomic History    Marital status:      Spouse name: Not on file    Number of children: Not on file    Years of education: Not on file    Highest education level: Not on file   Occupational History    Not on file   Tobacco Use    Smoking status: Former     Types: Cigarettes    Smokeless tobacco: Never   Vaping Use    Vaping status: Never Used   Substance and Sexual Activity    Alcohol use: Not Currently     Comment: quit 20 years    Drug use: Never    Sexual activity: Not on file   Other Topics Concern    Not on file   Social History Narrative    Not on file     Social Drivers of Health     Financial Resource Strain: Low Risk  (1/21/2025)    Financial Resource Strain     Within the past 12 months, have you or your family members you live with been unable to get utilities (heat, electricity) when it was really needed?: No   Food Insecurity: Low Risk   (1/21/2025)    Food Insecurity     Within the past 12 months, did you worry that your food would run out before you got money to buy more?: No     Within the past 12 months, did the food you bought just not last and you didn t have money to get more?: No   Transportation Needs: High Risk (1/21/2025)    Transportation Needs     Within the past 12 months, has lack of transportation kept you from medical appointments, getting your medicines, non-medical meetings or appointments, work, or from getting things that you need?: Yes   Physical Activity: Unknown (10/8/2024)    Exercise Vital Sign     Days of Exercise per Week: 0 days     Minutes of Exercise per Session: Not on file   Stress: No Stress Concern Present (10/8/2024)    [employer] - Occupational Stress Questionnaire     Feeling of Stress : Only a little   Social Connections: Unknown (10/8/2024)    Social Connection and Isolation Panel [NHANES]     Frequency of Communication with Friends and Family: Not on file     Frequency of Social Gatherings with Friends and Family: Patient declined     Attends Anabaptism Services: Not on file     Active Member of Clubs or Organizations: Not on file     Attends Club or Organization Meetings: Not on file     Marital Status: Not on file   Interpersonal Safety: Low Risk  (1/21/2025)    Interpersonal Safety     Do you feel physically and emotionally safe where you currently live?: Yes     Within the past 12 months, have you been hit, slapped, kicked or otherwise physically hurt by someone?: No     Within the past 12 months, have you been humiliated or emotionally abused in other ways by your partner or ex-partner?: No   Housing Stability: Low Risk  (1/21/2025)    Housing Stability     Do you have housing? : Yes     Are you worried about losing your housing?: No        FAMILY HISTORY: History reviewed. No pertinent family history.     EXAM:Vitals: BP 97/78   Pulse 90   Temp 96.8  F (36  C) (Axillary)    Resp 21   Wt 77 kg (169 lb 12.1 oz)   SpO2 98%   BMI 22.40 kg/m    BMI= Body mass index is 22.4 kg/m .    LABS:     Last Comprehensive Metabolic Panel:  Sodium   Date Value Ref Range Status   02/17/2025 129 (L) 135 - 145 mmol/L Final     Potassium   Date Value Ref Range Status   02/17/2025 5.4 (H) 3.4 - 5.3 mmol/L Final     Chloride   Date Value Ref Range Status   02/17/2025 92 (L) 98 - 107 mmol/L Final     Carbon Dioxide (CO2)   Date Value Ref Range Status   02/17/2025 24 22 - 29 mmol/L Final     Anion Gap   Date Value Ref Range Status   02/17/2025 13 7 - 15 mmol/L Final     GLUCOSE BY METER POCT   Date Value Ref Range Status   02/18/2025 97 70 - 99 mg/dL Final     Urea Nitrogen   Date Value Ref Range Status   02/17/2025 42.2 (H) 8.0 - 23.0 mg/dL Final     Creatinine   Date Value Ref Range Status   02/17/2025 4.18 (H) 0.67 - 1.17 mg/dL Final     GFR Estimate   Date Value Ref Range Status   02/17/2025 15 (L) >60 mL/min/1.73m2 Final     Comment:     eGFR calculated using 2021 CKD-EPI equation.     Calcium   Date Value Ref Range Status   02/17/2025 10.3 8.8 - 10.4 mg/dL Final     Lab Results   Component Value Date    WBC 13.0 01/22/2025     Lab Results   Component Value Date    RBC 3.49 01/22/2025     Lab Results   Component Value Date    HGB 10.8 01/22/2025     Lab Results   Component Value Date    HCT 33.8 01/22/2025     Lab Results   Component Value Date     01/22/2025      Lab Results   Component Value Date    INR 2.70 01/22/2025    INR 2.44 01/21/2025    INR 4.7 01/14/2025    INR 2.4 01/09/2025    INR 1.6 01/06/2025    INR 1.9 01/02/2025    INR 1.3 12/30/2024    INR 1.0 12/27/2024    INR 1.1 12/26/2024    INR 1.0 12/24/2024    INR 2.28 12/07/2024    INR 2.59 12/06/2024    INR 2.30 12/05/2024    INR 2.29 12/05/2024    INR 2.39 12/03/2024    INR 1.92 12/02/2024    INR 1.87 12/01/2024    INR 1.64 11/30/2024    INR 1.75 11/29/2024    INR 1.76 11/28/2024        General appearance: Patient is alert and  fully cooperative with history & exam.  No sign of distress is noted during the visit.      Respiratory: Breathing is regular & unlabored while sitting.      HEENT: Hearing is intact to spoken word.  Speech is clear.  No gross evidence of visual impairment that would impact ambulation.      Dermatologic: Right foot: incision site intact and viable. No cellulitis or ischemia. Minimal sanguinous drainage. Nontender.      Vascular: Dorsalis pedis and posterior tibial pulses are difficult to palpate. Audible with dopplar. DP audible into midfoot, but weakens at digits level.      Neurologic: Lower extremity sensation is diminished, bilateral foot, to light touch.  No evidence of neurological-based weakness or contracture in the lower extremities.       Musculoskeletal: Patient is ambulatory without an assistive device or brace.  No gross foot or ankle deformity noted.       Psychiatric: Affect is pleasant & appropriate.      All cultures:  Recent Labs   Lab 02/11/25  1040   CULTURE No anaerobic organisms isolated  No Growth  See corresponding culture for results        IMAGING:     Arterial US 1/22:    IMPRESSION:  1.  No blood flow demonstrated in the distal superficial femoral, popliteal, and runoff arteries. There may be trickle blood flow in the distal runoff arteries, though difficult to distinguish from artifact.     2.  Uncertain if stent is in the right lower extremity. Previous angiogram images are not available for review.    Right ankle MRI:   IMPRESSION:  1.  There is a soft tissue ulceration over the posterior aspect of the hindfoot with some surrounding edema or cellulitis but no evidence for abscess.  2.  No evidence for osteomyelitis.  3.  Small tibiotalar joint effusion.  4.  No evidence for fracture.  5.  Acute on chronic plantar fasciitis. Plantar calcaneal spurring.  6.  No additional tendinous or ligamentous pathology.    I personally reviewed the images and agree with the reports as  stated.    Right foot XR 2/11:  IMPRESSION: Status post transmetatarsal amputation of the right forefoot. Surrounding soft tissue swelling. No definite evidence of acute osteomyelitis or fracture. Normal joint alignment. Vascular calcification     Culture:  Foot, Right; Tissue   0 Result Notes  Culture No growth, less than 1 day        Pathology:   Final Diagnosis   Forefoot, right transmetatarsal amputation:    -Foci of skin and soft tissue necrosis with osteomyelitis.  -Viable bone and soft tissue resection margins, without osteomyelitis.               ASSESSMENT:  Right forefoot gangrene now s/p TMA on 2/11  Right heel gangrene --> no osteomyelitis   Peripheral Arterial Disease s/p right fem - BK pop bypass  Diabetes Mellitus --> hba1c: 5.7     MEDICAL DECISION MAKING:   -Discussed all findings with patient. Chart and imaging reviewed.     -Right foot dressing changed. 1 week out from TMA. Incision site intact and healing. No cellulitis or ischemia.     -Posterior/plantar heel ulcer similar in appearance Necrotic and dry. No cellulitis or drainage.     -Continue to change dressings per order. Okay to discharge per podiatry when appropriate. Pathology shows no residual osteomyelitis and culture are no growth. Likely no further antibiotics needed for foot.     -Will sign off. Further follow up outpatient. [employer] text with questions.     Alyce Salas DPM   [facility]  Available via [employer] Text

## 2025-02-18 NOTE — PROGRESS NOTES
Pt vitally stable on RA overnight, needs met this shift, A/OX4, on low diet K+. R foot amputation Ace wrapped, BG AC HS,pain controlled with PRN oxy and robaxin. Awaiting TCU placement.

## 2025-02-18 NOTE — PROGRESS NOTES
Renal Medicine Inpatient Dialysis Note                                Arnulfo Pritchett MRN# [id number]   Age: 64 year old YOB: 1960   Date of Admission: 1/21/2025 Hospital LOS: 28          Assessment/Plan:     1) End stage renal disease on PD  Chronic PD for last 3.5 years.  Home unit [facility], nephrologist Dr. May  Rx: 5 cycles, 2 L fill volumes, 9 hours, last fill 1.2 L with Extraneal.       Transitioned to iHD 02.15.25     2) Moderate R pleural effusion: Status post thoracentesis on 1/24 and 2/2.  Transudative  Chest x-ray on 2/5 shows small layering effusion on right side     3) PAD, right heel gangrene, occluded right SFA.  Status post rt fem - below knee popliteal bypass this admission     4) Postsurgical anemia in patient with ESRD-  -status post blood transfusion.  Hemoglobin 7-8.   -EPO 20,000 units 2/9/25.   Now that on hemo will dose in dialysis starting next run as long as he is in hosp.          TTS schedule    Dr. Currie aware of need to remove PD catheter       Interval History:     Comfortable  Seen while on dialysis    Dialysis run parameters reviewed with dialysis RN at patient bedside.    3.5 hours  2K  UF = 2 liter  ANSHUL  No heparin  TDC    Stable initial portion of run     ROS     GENERAL: NAD, No fever,chills  R: NEGATIVE for significant cough or SOB  CV: NEGATIVE for chest pain, palpitations    Dialysis Parameters:     Vitals were reviewed  Patient Vitals for the past 8 hrs:   BP Temp Temp src Pulse Resp SpO2   02/18/25 0845 116/81 -- -- 93 15 --   02/18/25 0830 106/85 -- -- 83 17 --   02/18/25 0815 97/78 -- -- 90 21 --   02/18/25 0810 97/78 -- -- 97 18 --   02/18/25 0800 113/84 96.8  F (36  C) Axillary 87 18 98 %   02/18/25 0724 117/87 97.7  F (36.5  C) Oral 97 18 93 %   02/18/25 0105 120/88 97.3  F (36.3  C) -- 99 18 (!) 91 %     Wt Readings from Last 4 Encounters:   02/17/25 77 kg (169 lb 12.1 oz)   01/21/25 75.8 kg (167 lb)   01/03/25 78.9 kg (174 lb)   01/02/25  78.9 kg (173 lb 14.4 oz)     No intake/output data recorded.    Vitals:    02/14/25 1728 02/15/25 0553 02/15/25 1900 02/16/25 1530   Weight: 77.9 kg (171 lb 11.8 oz) 81.3 kg (179 lb 3.7 oz) 80 kg (176 lb 5.9 oz) 80.9 kg (178 lb 5.6 oz)    02/17/25 0636   Weight: 77 kg (169 lb 12.1 oz)       Current Weight: 77 range   Dry Weight: 76 ?  Dialysis Temp: 36.5  C  Access Device: TDC  Access Site: right  Dialyzer: Revaclear  Dialysis Bath: 52  Sodium Profile: n  UF Goal: 2  Blood Flow Rate (mL/min): 400  Total Treatment Time (hrs): 3.5  Heparin: Low dose as required      EPO dose: y  Zemplar: n  IV Fe: n      Medications and Allergies:     Reviewed      Physical Exam:     Seen and examined during course of dialysis run    /81   Pulse 93   Temp 96.8  F (36  C) (Axillary)   Resp 15   Wt 77 kg (169 lb 12.1 oz)   SpO2 98%   BMI 22.40 kg/m      GENERAL: awake, alert, follows  HEENT: NC/AT, PERRLA, EOMI, non icteric, pharynx moist without lesion  RESP: clear  CV: RRR, normal S1 S2  SKIN: catheter site clean without drainage      Data:       Recent Labs   Lab 02/18/25  0722 02/17/25  1237 02/17/25  0707   NA  --   --  129*   POTASSIUM  --   --  5.4*   CHLORIDE  --   --  92*   CO2  --   --  24   ANIONGAP  --   --  13   GLC 97   < > 133*   BUN  --   --  42.2*   CR  --   --  4.18*   GFRESTIMATED  --   --  15*   TERENCE  --   --  10.3    < > = values in this interval not displayed.     Recent Labs   Lab 02/17/25  0707 02/16/25  0308   POTASSIUM 5.4* 4.6     Recent Labs   Lab 02/12/25  0726   ALBUMIN 1.6*     Recent Labs   Lab 02/17/25  0707 02/16/25  0308 02/15/25  1829 02/15/25  0900 02/15/25  0600 02/14/25  0723   PHOS 5.4* 4.1  --   --  5.6* 5.5*   HGB 8.8* 7.9* 8.3* 8.0*  --  8.4*         G Boo Ray MD    [facility] - Nephrology  [phone]

## 2025-02-18 NOTE — PROGRESS NOTES
[facility]    Medicine Progress Note - Hospitalist Service    Date of Admission:  1/21/2025    Assessment & Plan   Arnulfo Pritchett is a 64 year old male with past medical history of severe heart failure (EF 30%), ESRD (on peritoneal dialysis), chronic paroxysmal AF on warfarin, LLE PAD s/p endarterectomy, RCC s/p nephrectomy admitted on 1/21/2025 for septic shock secondary to right leg PAD with worsening right heel necrotic ulcer. The patient was seen in ED at outside hospital on 1/3/25 and diagnosed with pneumonia, later completing course of Cefdinir. He then presented on 1/21/25 to outside hospital for right leg pain and found to have right leg ulcer and transferred to [facility] for vascular surgery evaluation. Now s/p right common femoral BK popliteal/tibial endarterectomy and right common femoral to below-knee popliteal/tibial PTFE bypass performed on 1/27/25. The patient was admitted to the ICU for requirement of mechanical ventilation and pressor support following surgery for multifactorial shock.       Hospitalist service consulted 1/31/2025 for transfer out of ICU and to assist with medical management along with vascular surgery, podiatry and Infectious disease.     Hx PAD of LLE s/p endarterectomy and fem-tibioperoneal trunk bypass on 10/25/24  Hx RLE arterial occlusion s/p SFA angioplasty and stent 5/2024  PAD RLE, right heel gangrene, occluded SFA, no evidence of osteomyelitis  s/p right common femoral and popliteal/tibial endarterectomy, right femoral to popliteal/tibial PTFE bypass 1/27/25   # Right groin hematoma resolving   -Found to have critical limb ischemia on admission on January 21, for details see admission note.   -Distributive shock following surgery followed by the ICU service until 1/31.  -Followed by vascular surgery, podiatry, wound care, and ID.  -Coumadin has been held since admission, was was started on IV heparin drip since admission.  -Restarted warfarin on  2/14/2025 after discussion with vascular surgery.  INR now in goal range, heparin gtt discontinued.  -Has been maintained on statin and Plavix 75 mg daily during this hospital stay.  -Recent IV piperacillin/tazobactam (Zosyn), discontinued 2/2 per ID after 12 days of Zosyn; follow-up cultures, monitor off antibiotics.  -Wound care per surgery and WOC.  -pain control, see hospital orders.  -worsening ischemic changes in 2nd-5th toes of right foot.  -Underwent transmetatarsal amputation on 2/11/2025.  Tolerated procedure well.  Intraoperatively no signs of infection.  No further surgery per podiatry.  -Leukocytosis improved postsurgery.  Hemoglobin trended down to 7 on 2/13/2025 requiring transfusion, Hgb stable since then.     Multifactorial shock, resolved  ICU admission, weaned off vasopressors 1/30.  -Transitioned to midodrine with increase in prior to admission dose on1/31.  -Monitor vitals     End-stage renal disease (ESRD), on peritoneal dialysis  Hx of secondary hyperparathyroidism and hyperphosphatemia, phosphorous elevated above baseline of 6 at most recent of 7.9  Nephrology following.  -Chronic peritoneal dialysis for the last 3.5 years. Patient expressed interest to switch to HD. Nephrology discussed with his primary nephrologist who agrees to transition to hemodialysis.  Underwent right tunneled IJ catheter placement by IR on 2/12/2025.  Now undergoing HD on MWF schedule.  - On 2/16, patient having some episodes of shortness of breath that last 30-60 seconds. ECG without any changes concerning for ACS. CXR shows moderate partially loculated R pleural effusion which is the same to slightly worse from his last CXR. Currently satting well on room air. Will restart albuterol inhaler that patient was using outpatient after his recent pneumonia and monitor for improvement after dialysis tomorrow, possible more volume needs to be taken off during HD. If no improvement with SOB after dialysis run tomorrow, would  place IR consult for repeat thoracentesis.  -Continue midodrine.  -Coumadin held for PD catheter removal and currently INR is supratherapeutic at 3.35   -If INR is subtherapeutic will need heparin bridging          acute hypoxemic respiratory failure, resolved  Right pleural effusion  Status post thoracentesis 1/24/25, repeat 2/2/25  -Following surgery on admission by vascular surgery was intubated.  Recent pneumonia treated with 3 weeks of Ceftin prior to this admission. Extubated 1/29/25  -Right pleural effusion.  Status post thoracentesis 1/24/25 with 1.5 L clear yellow fluid removed, likely transudative based on low cell count of 117, , and protein of 1.7. Possibly secondary to reduced peritoneal dialysis during hospitalization vs underlying severe HFrEF (30%).  -CT chest 2/1/25, see report, no clear pneumonia; large right pleural effusion noted.  -Now off oxygen. Encourage incentive spirometry.  -Antibiotics as above, discontinued by ID 2/2  -ID consulted 2/1 as above, recommend monitoring patient off antibiotics  -S/p repeat Right thoracentesis on 2/2 with 2.3 L of rolan fluid removed; additional studies, cultures (negative to date), cytology (negative for malignancy)      Paroxysmal atrial fibrillation  Chronic anticoagulation prior to admission, warfarin - ON HOLD  Ischemic cardiomyopathy with HLD, CAD s/p multiple stents, and severe HFrEF (30%)   Troponin elevation, likely type 2 MI  Wide complex tachycardia 2/1/25  Assessment-postoperatively has been maintained on heparin drip.  Coumadin has been held during his hospital stay.  -Echo 1/31-EF 25 to 30%.  Severe global hypokinesis with abnormal septal motion consistent with conduction abnormality.  Severe inferior wall hypokinesis.  LVH.  RV mildly dilated and mildly reduced RV function.  Mild mitral regurgitation.  Mild to moderate mitral stenosis.  Moderate aortic stenosis.  Left ventricular fairly pressures elevated.  -Episode of wide-complex  tachycardia noted by nursing staff, 15 beats, up on 2/1/2025.  -Continue inpatient telemetry.  -EP consulted on 2/16 given tachycardia with rates to 120s. They evaluated patient and said not unexpected given patient's severe HFrEF.              - Recommended increasing Toprol XL to 12.5 mg BID (PTA was on 25 mg daily)  -Troponin elevated to 513 on 2/16, checked for report of shortness of breath and new incomplete RBBB on ECG on 2/16.     Coumadin held for PD catheter removal and currently INR is supratherapeutic at 3.35   -If INR is subtherapeutic will need heparin bridging         Anemia of chronic disease  Baseline around 10.  Has been stable in the 8 range. Did need 1 U PRBC on 2/8 and 2/13 for Hgb<7 without signs of active bleeding.  -Nephrology started patient on EPO on 2/9 and to continue weekly.  -Continue to monitor hemoglobin daily,       # Hyponatremia  Hyperkalemia  Continue hemodialysis      Hyperglycemia  Type 2 diabetes   hemoglobin A1c 5.7% October 24  PTA regimen: Lantus 35 units at bedtime  Insulin requirements have been decreasing and he started having hypoglycemia episodes after being started on HD on 2/13/2025  Discontinue Lantus and carb coverage  Continue sliding scale insulin. Discharge insulin dosing dependent on requirements from sliding scale.  Continue to monitor FSBS       Mixed anion gap acidosis, resolved  -Improved respiratory function, monitor.  Anion gap resolved 1/31.     Abdominal discomfort 1/30, resolved  Noted to have some right upper quadrant pain 1/30.  Now resolved.  LFTs normal.  Was on Zosyn for above surgical issues.   -Monitor abdominal exam.  -Antibiotics managed as noted above.     Moderate protein calorie nutrition  -Nutrition consulted.  -Speech and language therapy (SLP) consulted, diet per speech therapy.     Gout  -PTA allopurinol currently on hold, reassess daily.     History of renal cell carcinoma   -Status post right nephrectomy, monitor.     History of  obstructive sleep apnea.  By report, intolerant of CPAP   -Monitor, follow-up with outpatient provider.     Weakness and deconditioning  -PT, OT, speech and language therapy (SLP) consulted.  -Increase activity as tolerated.  -Currently TCU recommended. If respiratory status improved and patient feeling better after HD run tomorrow, would be medically ready for discharge to TCU if cleared by vascular surgery as well.     Disposition  Anticipated discharge timing see Disposition Plan below.  -Anticipated discharge to transitional care unit.  -Social work consulted for discharge planning.      # Family communication  -Updated patient's son Ervin South who is the healthcare proxy.    Patient's son had questions about this amputation surgery and I did tell him that patient underwent transmetatarsal amputation on 2/11/2025 by podiatry.  I did review podiatry notes with the patient  son about the the procedure.  Patient's son said that he was not aware of the procedure and his father was not sharing all information and has occasionally been confused    Son also questions about left kidney cancer as patient told the family that he has kidney cancer.  Patient has previous history of renal cell carcinoma s/p right nephrectomy   I discussed with the son that patient has left-sided cysts on his CAT scans which needs follow-up with urology     I also discussed with the patient's son about patient's poor quality of life having multiple recent admissions and due to peripheral vascular disease, cardiomyopathy with an EF of 20%, end-stage renal disease on dialysis about patient's poor quality   Son concerned about recent cognitive dysfunction of his father.  Son also mentioned that previously patient was DNR and during his recent goals of care conversation patient was made full code.    Son will be visiting on Thursday     Plan is to order MRI of the brain tomorrow without contrast to rule out a stroke.    Will readdress goals  "of care with the patient and patient's family         Goals of care discussion:  Patient had mentioned to nursing staff about \"wanting to die\", aand also mentioned to prior hospitalist he is tired of living like this and he did not think he was going to make it out of the hospital. Palliative consulted. When palliative saw patient the next day, patient appeared to be in a better mood and stated he wanted to continue restorative cares. He has however mentioned to Nephrology that he will like to switch from peritoneal dialysis to HD as it will make him feel less fatigued , if however HD does not help, then he would consider comfort measures.   Currently patient's goals are restorative and palliative has signed off               Diet: Combination Diet Regular Diet; 3 gm K Diet (low potassium)  Snacks/Supplements Adult: Ensure Enlive; Between Meals    DVT Prophylaxis: Warfarin  Rosario Catheter: Not present  Lines: PRESENT      CVC Double Lumen Right Internal jugular Non - valved (open ended);Tunneled-Site Assessment: WDL      Cardiac Monitoring: None  Code Status: Full Code      Clinically Significant Risk Factors        # Hyperkalemia: Highest K = 6 mmol/L in last 2 days, will monitor as appropriate  # Hyponatremia: Lowest Na = 127 mmol/L in last 2 days, will monitor as appropriate  # Hypochloremia: Lowest Cl = 90 mmol/L in last 2 days, will monitor as appropriate      # Hypoalbuminemia: Lowest albumin = 1.6 g/dL at 2/12/2025  7:26 AM, will monitor as appropriate     # Hypertension: Noted on problem list  # Chronic heart failure with reduced ejection fraction: last echo with EF <40%          # DMII: A1C = 6.6 % (Ref range: <5.7 %) within past 6 months    # Severe Malnutrition: based on nutrition assessment    # Financial/Environmental Concerns:           Social Drivers of Health    Tobacco Use: Medium Risk (2/11/2025)    Patient History     Smoking Tobacco Use: Former     Smokeless Tobacco Use: Never   Alcohol Use: " Unknown (11/28/2018)    Received from [facility], [facility] and [facility]    AUDIT-C     Frequency of Alcohol Consumption: Monthly or less     Frequency of Binge Drinking: Never   Transportation Needs: High Risk (1/21/2025)    Transportation Needs     Within the past 12 months, has lack of transportation kept you from medical appointments, getting your medicines, non-medical meetings or appointments, work, or from getting things that you need?: Yes   Physical Activity: Unknown (10/8/2024)    Exercise Vital Sign     Days of Exercise per Week: 0 days   Social Connections: Unknown (10/8/2024)    Social Connection and Isolation Panel [NHANES]     Frequency of Social Gatherings with Friends and Family: Patient declined          Disposition Plan     Medically Ready for Discharge: Anticipated Tomorrow             Elizabeth Slater MD  Hospitalist Service  [facility]  Securely message with [employer] (more info)  Text page via [employer] Paging/Directory   ______________________________________________________________________    Interval History     Patient seen and examine at bedside  Denies any pain  Not in distress    Physical Exam   Vital Signs: Temp: 97.5  F (36.4  C) Temp src: Oral BP: 101/73 Pulse: 98   Resp: 29 SpO2: 92 % O2 Device: None (Room air)    Weight: 169 lbs 12.07 oz    Physical Exam  Cardiovascular:      Rate and Rhythm: Normal rate and regular rhythm.   Pulmonary:      Effort: Pulmonary effort is normal.      Breath sounds: No wheezing.   Abdominal:      General: There is no distension.      Palpations: Abdomen is soft.      Tenderness: There is no abdominal tenderness.   Musculoskeletal:      Comments: Right foot covered with Dressing        .phy  Medical Decision Making       40 MINUTES SPENT BY ME on the date of service doing chart review, history, exam, documentation & further activities per the note.      Data     I  have personally reviewed the following data over the past 24 hrs:    N/A  \   N/A   / N/A     127 (L) 90 (L) 51.8 (H) /  86   6.0 (H) 23 4.98 (H) \     Procal: N/A CRP: N/A Lactic Acid: 0.6 (L)       INR:  3.35 (H) PTT:  N/A   D-dimer:  N/A Fibrinogen:  N/A       Imaging results reviewed over the past 24 hrs:   No results found for this or any previous visit (from the past 24 hours).

## 2025-02-18 NOTE — PLAN OF CARE
Date & Time: 02/17/25 5522-2820     Summary:    1/21 Admitted for right leg pain and found to have right leg ulcer   1/23 Diagnostic angiogram   1/24 Thoracentesis   1/27 R common femoral and popliteal tibial endarterectomy, R femoral to popliteal/ tibial PTFE bypass- ICU for mechanical Ventilation  2/2 Thoracentesis   2/11 from R transmetatarsal amputation.   2/12 Tunneled dialysis catheter placement     Behavior & Aggression: Green   Fall Risk: Yes   Orientation: A&Ox4  ABNL VS/O2:VSS on RA  ABNL Labs: see chart.  Pain Management: PRN oxy x1  Bowel/Bladder: Urinal at bedside, Inc BM  IV/Drains: PIV SL  Skin: RLE incision sites are C/D/I. R foot amputation covered with Ace dressing. Black heel/malleolus wounds. Blanchable redness to coccyx R groin site w/ old hematoma. Peritoneal site. Doppler pulses intact.  Diet: Low K+ diet  Activity Level: not OOB, up w/ lift, Ax2w/ cares, T&R q2hrs as tolerated.  Tests/Procedures: N/A  Anticipated  DC Date: TBD needs TCU placement  Significant information: Tolerating Low K+ Diet. Dialysis Tu/Th/Sa. SW working on TCU. Per nephrology pt needs PT catheter removed prior to discharge. Vascular (primary) notified.

## 2025-02-18 NOTE — PROGRESS NOTES
Potassium   Date Value Ref Range Status   02/17/2025 5.4 (H) 3.4 - 5.3 mmol/L Final     Hemoglobin   Date Value Ref Range Status   02/17/2025 8.8 (L) 13.3 - 17.7 g/dL Final     Creatinine   Date Value Ref Range Status   02/17/2025 4.18 (H) 0.67 - 1.17 mg/dL Final     Urea Nitrogen   Date Value Ref Range Status   02/17/2025 42.2 (H) 8.0 - 23.0 mg/dL Final     Sodium   Date Value Ref Range Status   02/17/2025 129 (L) 135 - 145 mmol/L Final     INR   Date Value Ref Range Status   02/18/2025 3.35 (H) 0.85 - 1.15 Final     INR (External)   Date Value Ref Range Status   01/14/2025 4.7 (A) 0.9 - 1.1 Final       DIALYSIS PROCEDURE NOTE  Hepatitis status of previous patient on machine log was checked and verified ok to use with this patients hepatitis status.  Patient dialyzed for 3.5 hrs. on a K2 bath with a net fluid removal of  2L.  A BFR of 400 ml/min was obtained via a [facility] CVC.   The treatment plan was discussed with Dr. Ray during the treatment.    Total heparin received during the treatment: 0 units.     Line flushed, clamped and capped with heparin 1:1000 1.6 mL (1600 units) per lumen    Meds  given: Epoetin [id number]     Complications: None      Person educated: Patient. Knowledge base Substantial. Barriers to learning: Substantial. Educated on procedure via verbal mode. Patient verbalized understanding.   ICEBOAT? Timeout performed pre-treatment  I: Patient was identified using 2 identifiers  C:  Consent Signed Yes  E: Equipment preventative maintenance is current and dialysis delivery system OK to use  B: Hepatitis B Surface Antigen: Negative; Draw Date: 01/22/2025      Hepatitis B Surface Antibody: Unknown; Draw Date: 01/22/2025  O: Dialysis orders present and complete prior to treatment  A: Vascular access verified and assessed prior to treatment  T: Treatment was performed at a clinically appropriate time  ?: Patient was allowed to ask questions and address concerns prior to treatment  See Adult Hemodialysis  flowsheet in EPIC for further details and post assessment.  Machine water alarm in place and functioning. Transducer pods intact and checked every 15min.   Pt assisted with repositioning throughout dialysis treatment.  Pt returned via bed.  Chlorine/Chloramine water system checked every 4 hours.        Post treatment report given to TRIXIE Allred RN regarding 2L of fluid removed.      TRIXIE Red RN

## 2025-02-18 NOTE — PROGRESS NOTES
Care Management Follow Up    Length of Stay (days): 28    Expected Discharge Date: 02/21/2025     Concerns to be Addressed: discharge planning     Patient plan of care discussed at interdisciplinary rounds: Yes    Anticipated Discharge Disposition: Skilled Nursing Facility              Anticipated Discharge Services:    Anticipated Discharge DME:      Patient/family educated on Medicare website which has current facility and service quality ratings:    Education Provided on the Discharge Plan:    Patient/Family in Agreement with the Plan: yes    Referrals Placed by CM/SW:    Private pay costs discussed: Not applicable    Discussed  Partnership in Safe Discharge Planning  document with patient/family: No     Handoff Completed: No, handoff not indicated or clinically appropriate    Additional Information:  Writer received a call from patients son Ervin, Ervin also added his wife to the call as well. Family had questions regarding patients time of discharge. Writer explained that patient is waiting on accepting TCU as well as getting his PD catheter removed. Writer explained that we are looking at the end of the week for possible discharge. Family was understanding of this and then had more questions about post TCU. Family does not think that patient will be able to manage at home independently and will need more assistance. Writer explained the options for [facility] vs LTC. Family stated that they have tried the [facility], elderly waiver resources in the past but patient is over assets.     Family had follow up questions for the physician, writer messaged Dr. Slater to see if he would be able to call the son.     Next Steps: Get accepting TCU    KATELYN WILKINS  [facility]  INPATIENT CARE COORDINATION

## 2025-02-18 NOTE — PROGRESS NOTES
Vascular surgery progress note    No issues overnight.  Pain reasonably controlled.  Is not been out of bed.    Vitals reviewed    On exam resting company in bed  Nonlabored breathing on room air  Right groin hematoma resolving, small superficial eschar at the distal aspect of the right groin incision  Calf wound is healed  Right foot dressing in place, clean dry and intact  Brisk biphasic Doppler signals at the distal AT and PT just above the ankle    Labs reviewed, INR 2.1 yesterday    Assessment:  64-year-old male with multiple Comorbidities, now status post right femoral to below-knee popliteal bypass with composite greater saphenous and ringed PTFE bypass graft, now status post right transmetatarsal amputation with remaining necrotic right heel ulcer.  Recently switched from PD to HD after tunnel line placement.  Awaiting TCU bed and is asked for PD catheter removal    Plan:  Continue current cares including Plavix and high-dose statin therapy  Warfarin is now held after his last dose yesterday evening in anticipation for PD catheter removal tomorrow or later this week, pending OR availability    We will defer to the hospital service as to the need for bridging with heparin drip, as he is anticoagulated not only for his bypass graft but also paroxysmal A-fib in the setting of ischemic cardiomyopathy and severely reduced ejection fraction    Jersey Maki MD

## 2025-02-18 NOTE — PROGRESS NOTES
CLINICAL NUTRITION SERVICES - REASSESSMENT NOTE     Malnutrition Status:    Severe malnutrition in the context of acute on chronic illness or injury     Registered Dietitian Interventions:  - Small/frequent meals.   - Will hold Ensure for a couple days, re-start on 2/20.        SUBJECTIVE INFORMATION  Assessed patient in room.    CURRENT NUTRITION ORDERS  Diet: 3 g Potassium + daily ensure    CURRENT INTAKE/TOLERANCE  Fair intakes. Pt seen in room while eating lunch - had eaten most of a bowl of tomato soup, taking bites of macaroni and cheese. Also had some 1% milk on tray table. At least 2 unopened Ensures noted, pt asked that we slow down the frequency to allow him to catch up.   He is receiving meals TID. 25-50% intakes recorded.     Encouraged PO, let patient know that the milk and mac & cheese were decent protein options and to keep doing the best that he can with PO.     Barriers: pt reported biggest barrier currently is early satiety. He gets full so quickly.      NEW FINDINGS  Weight: 77 kg yesterday - low end of range.  GI symptoms: no apparent issues   Skin: foot wound cares per podiatry / vascular  Nutrition-relevant labs:  Na 127 (L), K 6 (H), Mg 2.4 (H), Phos 5.6 (H) - now on HD.   Nutrition-relevant medications:  Medium sliding scale insulin, triphrocaps, renvela    EVALUATION OF THE PROGRESS TOWARD GOALS   Previous Goals  Patient to consume at least 50% of nutritionally adequate meal trays TID, or the equivalent with supplements/snacks.   Evaluation: Unable to meet consistently     Previous Nutrition Diagnosis  Inadequate oral intake related to reduced appetite, feeling poorly, increase needs for dialysis and skin as evidenced by intakes have been 25% at best for at least the past 5 days.   Evaluation: Improving to 25-50%    NUTRITION DIAGNOSIS  Inadequate oral intake related to early satiety, low appetite, increased needs for HD, healing as evidenced by intakes of 25-50% of meals TID.      INTERVENTIONS  See nutrition interventions above    Goals  Patient to consume at least 50% of nutritionally adequate meal trays TID, or the equivalent with supplements/snacks.      Monitoring/Evaluation      Progress toward goals will be monitored and evaluated per policy.    Allison Garcia RD, LD  Pager: [phone]  Available on [employer]

## 2025-02-19 ENCOUNTER — ANESTHESIA (OUTPATIENT)
Dept: SURGERY | Facility: CLINIC | Age: 65
End: 2025-02-19
Payer: MEDICARE

## 2025-02-19 ENCOUNTER — ANESTHESIA EVENT (OUTPATIENT)
Dept: SURGERY | Facility: CLINIC | Age: 65
End: 2025-02-19
Payer: MEDICARE

## 2025-02-19 LAB
ANION GAP SERPL CALCULATED.3IONS-SCNC: 10 MMOL/L (ref 7–15)
BUN SERPL-MCNC: 27.1 MG/DL (ref 8–23)
CALCIUM SERPL-MCNC: 9.3 MG/DL (ref 8.8–10.4)
CHLORIDE SERPL-SCNC: 95 MMOL/L (ref 98–107)
CREAT SERPL-MCNC: 3.36 MG/DL (ref 0.67–1.17)
EGFRCR SERPLBLD CKD-EPI 2021: 20 ML/MIN/1.73M2
GLUCOSE BLDC GLUCOMTR-MCNC: 100 MG/DL (ref 70–99)
GLUCOSE BLDC GLUCOMTR-MCNC: 100 MG/DL (ref 70–99)
GLUCOSE BLDC GLUCOMTR-MCNC: 103 MG/DL (ref 70–99)
GLUCOSE BLDC GLUCOMTR-MCNC: 104 MG/DL (ref 70–99)
GLUCOSE BLDC GLUCOMTR-MCNC: 120 MG/DL (ref 70–99)
GLUCOSE SERPL-MCNC: 112 MG/DL (ref 70–99)
HCO3 SERPL-SCNC: 26 MMOL/L (ref 22–29)
INR PPP: 2.82 (ref 0.85–1.15)
MAGNESIUM SERPL-MCNC: 2.1 MG/DL (ref 1.7–2.3)
PHOSPHATE SERPL-MCNC: 4 MG/DL (ref 2.5–4.5)
POTASSIUM SERPL-SCNC: 4.7 MMOL/L (ref 3.4–5.3)
SODIUM SERPL-SCNC: 131 MMOL/L (ref 135–145)

## 2025-02-19 PROCEDURE — 120N000001 HC R&B MED SURG/OB

## 2025-02-19 PROCEDURE — 82565 ASSAY OF CREATININE: CPT | Performed by: INTERNAL MEDICINE

## 2025-02-19 PROCEDURE — 250N000013 HC RX MED GY IP 250 OP 250 PS 637

## 2025-02-19 PROCEDURE — 250N000011 HC RX IP 250 OP 636: Performed by: SURGERY

## 2025-02-19 PROCEDURE — 83735 ASSAY OF MAGNESIUM: CPT | Performed by: INTERNAL MEDICINE

## 2025-02-19 PROCEDURE — 250N000013 HC RX MED GY IP 250 OP 250 PS 637: Performed by: STUDENT IN AN ORGANIZED HEALTH CARE EDUCATION/TRAINING PROGRAM

## 2025-02-19 PROCEDURE — 82435 ASSAY OF BLOOD CHLORIDE: CPT | Performed by: INTERNAL MEDICINE

## 2025-02-19 PROCEDURE — 258N000003 HC RX IP 258 OP 636: Performed by: NURSE ANESTHETIST, CERTIFIED REGISTERED

## 2025-02-19 PROCEDURE — 80048 BASIC METABOLIC PNL TOTAL CA: CPT | Performed by: INTERNAL MEDICINE

## 2025-02-19 PROCEDURE — 250N000009 HC RX 250: Performed by: NURSE ANESTHETIST, CERTIFIED REGISTERED

## 2025-02-19 PROCEDURE — 49422 REMOVE TUNNELED IP CATH: CPT | Mod: 79 | Performed by: SURGERY

## 2025-02-19 PROCEDURE — 710N000009 HC RECOVERY PHASE 1, LEVEL 1, PER MIN: Performed by: SURGERY

## 2025-02-19 PROCEDURE — 36415 COLL VENOUS BLD VENIPUNCTURE: CPT | Performed by: INTERNAL MEDICINE

## 2025-02-19 PROCEDURE — 370N000017 HC ANESTHESIA TECHNICAL FEE, PER MIN: Performed by: SURGERY

## 2025-02-19 PROCEDURE — 360N000075 HC SURGERY LEVEL 2, PER MIN: Performed by: SURGERY

## 2025-02-19 PROCEDURE — 250N000011 HC RX IP 250 OP 636: Performed by: NURSE ANESTHETIST, CERTIFIED REGISTERED

## 2025-02-19 PROCEDURE — 250N000009 HC RX 250: Performed by: STUDENT IN AN ORGANIZED HEALTH CARE EDUCATION/TRAINING PROGRAM

## 2025-02-19 PROCEDURE — 85610 PROTHROMBIN TIME: CPT | Performed by: HOSPITALIST

## 2025-02-19 PROCEDURE — 250N000013 HC RX MED GY IP 250 OP 250 PS 637: Performed by: PODIATRIST

## 2025-02-19 PROCEDURE — 99232 SBSQ HOSP IP/OBS MODERATE 35: CPT | Performed by: STUDENT IN AN ORGANIZED HEALTH CARE EDUCATION/TRAINING PROGRAM

## 2025-02-19 PROCEDURE — 999N000141 HC STATISTIC PRE-PROCEDURE NURSING ASSESSMENT: Performed by: SURGERY

## 2025-02-19 PROCEDURE — 272N000001 HC OR GENERAL SUPPLY STERILE: Performed by: SURGERY

## 2025-02-19 PROCEDURE — 0WPG03Z REMOVAL OF INFUSION DEVICE FROM PERITONEAL CAVITY, OPEN APPROACH: ICD-10-PCS | Performed by: SURGERY

## 2025-02-19 PROCEDURE — 250N000011 HC RX IP 250 OP 636: Performed by: STUDENT IN AN ORGANIZED HEALTH CARE EDUCATION/TRAINING PROGRAM

## 2025-02-19 PROCEDURE — 250N000013 HC RX MED GY IP 250 OP 250 PS 637: Performed by: INTERNAL MEDICINE

## 2025-02-19 PROCEDURE — 84100 ASSAY OF PHOSPHORUS: CPT | Performed by: INTERNAL MEDICINE

## 2025-02-19 PROCEDURE — 99231 SBSQ HOSP IP/OBS SF/LOW 25: CPT | Performed by: INTERNAL MEDICINE

## 2025-02-19 RX ORDER — ACETAMINOPHEN 325 MG/1
975 TABLET ORAL EVERY 8 HOURS
Status: DISCONTINUED | OUTPATIENT
Start: 2025-02-19 | End: 2025-02-19

## 2025-02-19 RX ORDER — ONDANSETRON 2 MG/ML
4 INJECTION INTRAMUSCULAR; INTRAVENOUS EVERY 30 MIN PRN
Status: CANCELLED | OUTPATIENT
Start: 2025-02-19

## 2025-02-19 RX ORDER — ONDANSETRON 4 MG/1
4 TABLET, ORALLY DISINTEGRATING ORAL EVERY 30 MIN PRN
Status: CANCELLED | OUTPATIENT
Start: 2025-02-19

## 2025-02-19 RX ORDER — POLYETHYLENE GLYCOL 3350 17 G/17G
17 POWDER, FOR SOLUTION ORAL DAILY
Status: DISCONTINUED | OUTPATIENT
Start: 2025-02-20 | End: 2025-03-01 | Stop reason: HOSPADM

## 2025-02-19 RX ORDER — HYDROMORPHONE HCL IN WATER/PF 6 MG/30 ML
0.1 PATIENT CONTROLLED ANALGESIA SYRINGE INTRAVENOUS EVERY 4 HOURS PRN
Status: DISCONTINUED | OUTPATIENT
Start: 2025-02-19 | End: 2025-03-01 | Stop reason: HOSPADM

## 2025-02-19 RX ORDER — SODIUM CHLORIDE, SODIUM LACTATE, POTASSIUM CHLORIDE, CALCIUM CHLORIDE 600; 310; 30; 20 MG/100ML; MG/100ML; MG/100ML; MG/100ML
INJECTION, SOLUTION INTRAVENOUS CONTINUOUS
Status: CANCELLED | OUTPATIENT
Start: 2025-02-19

## 2025-02-19 RX ORDER — PROPOFOL 10 MG/ML
INJECTION, EMULSION INTRAVENOUS CONTINUOUS PRN
Status: DISCONTINUED | OUTPATIENT
Start: 2025-02-19 | End: 2025-02-19

## 2025-02-19 RX ORDER — SODIUM CHLORIDE 9 MG/ML
INJECTION, SOLUTION INTRAVENOUS CONTINUOUS PRN
Status: DISCONTINUED | OUTPATIENT
Start: 2025-02-19 | End: 2025-02-19

## 2025-02-19 RX ORDER — PROPOFOL 10 MG/ML
INJECTION, EMULSION INTRAVENOUS PRN
Status: DISCONTINUED | OUTPATIENT
Start: 2025-02-19 | End: 2025-02-19

## 2025-02-19 RX ORDER — DEXAMETHASONE SODIUM PHOSPHATE 4 MG/ML
4 INJECTION, SOLUTION INTRA-ARTICULAR; INTRALESIONAL; INTRAMUSCULAR; INTRAVENOUS; SOFT TISSUE
Status: CANCELLED | OUTPATIENT
Start: 2025-02-19

## 2025-02-19 RX ORDER — OXYCODONE HYDROCHLORIDE 5 MG/1
5 TABLET ORAL EVERY 4 HOURS PRN
Status: DISCONTINUED | OUTPATIENT
Start: 2025-02-19 | End: 2025-03-01 | Stop reason: HOSPADM

## 2025-02-19 RX ORDER — NALOXONE HYDROCHLORIDE 0.4 MG/ML
0.1 INJECTION, SOLUTION INTRAMUSCULAR; INTRAVENOUS; SUBCUTANEOUS
Status: CANCELLED | OUTPATIENT
Start: 2025-02-19

## 2025-02-19 RX ORDER — HYDROMORPHONE HCL IN WATER/PF 6 MG/30 ML
0.2 PATIENT CONTROLLED ANALGESIA SYRINGE INTRAVENOUS EVERY 4 HOURS PRN
Status: DISCONTINUED | OUTPATIENT
Start: 2025-02-19 | End: 2025-03-01 | Stop reason: HOSPADM

## 2025-02-19 RX ORDER — HYDROMORPHONE HCL IN WATER/PF 6 MG/30 ML
0.2 PATIENT CONTROLLED ANALGESIA SYRINGE INTRAVENOUS EVERY 5 MIN PRN
Status: CANCELLED | OUTPATIENT
Start: 2025-02-19

## 2025-02-19 RX ORDER — AMOXICILLIN 250 MG
1 CAPSULE ORAL 2 TIMES DAILY
Status: DISCONTINUED | OUTPATIENT
Start: 2025-02-19 | End: 2025-03-01 | Stop reason: HOSPADM

## 2025-02-19 RX ORDER — LIDOCAINE HYDROCHLORIDE 20 MG/ML
INJECTION, SOLUTION INFILTRATION; PERINEURAL PRN
Status: DISCONTINUED | OUTPATIENT
Start: 2025-02-19 | End: 2025-02-19

## 2025-02-19 RX ORDER — BUPIVACAINE HYDROCHLORIDE 5 MG/ML
INJECTION, SOLUTION PERINEURAL PRN
Status: DISCONTINUED | OUTPATIENT
Start: 2025-02-19 | End: 2025-02-19 | Stop reason: HOSPADM

## 2025-02-19 RX ORDER — BISACODYL 10 MG
10 SUPPOSITORY, RECTAL RECTAL DAILY PRN
Status: DISCONTINUED | OUTPATIENT
Start: 2025-02-22 | End: 2025-03-01 | Stop reason: HOSPADM

## 2025-02-19 RX ORDER — FENTANYL CITRATE 50 UG/ML
25 INJECTION, SOLUTION INTRAMUSCULAR; INTRAVENOUS EVERY 5 MIN PRN
Status: CANCELLED | OUTPATIENT
Start: 2025-02-19

## 2025-02-19 RX ORDER — LIDOCAINE 40 MG/G
CREAM TOPICAL
Status: DISCONTINUED | OUTPATIENT
Start: 2025-02-19 | End: 2025-03-01 | Stop reason: HOSPADM

## 2025-02-19 RX ORDER — HYDROMORPHONE HCL IN WATER/PF 6 MG/30 ML
0.4 PATIENT CONTROLLED ANALGESIA SYRINGE INTRAVENOUS EVERY 5 MIN PRN
Status: CANCELLED | OUTPATIENT
Start: 2025-02-19

## 2025-02-19 RX ORDER — FENTANYL CITRATE 50 UG/ML
50 INJECTION, SOLUTION INTRAMUSCULAR; INTRAVENOUS EVERY 5 MIN PRN
Status: CANCELLED | OUTPATIENT
Start: 2025-02-19

## 2025-02-19 RX ADMIN — PHYTONADIONE 5 MG: 10 INJECTION, EMULSION INTRAMUSCULAR; INTRAVENOUS; SUBCUTANEOUS at 10:00

## 2025-02-19 RX ADMIN — CINACALCET 60 MG: 30 TABLET ORAL at 21:44

## 2025-02-19 RX ADMIN — MIDODRINE HYDROCHLORIDE 10 MG: 2.5 TABLET ORAL at 08:46

## 2025-02-19 RX ADMIN — METOPROLOL SUCCINATE 12.5 MG: 25 TABLET, EXTENDED RELEASE ORAL at 08:44

## 2025-02-19 RX ADMIN — PHENYLEPHRINE HYDROCHLORIDE 200 MCG: 10 INJECTION INTRAVENOUS at 13:18

## 2025-02-19 RX ADMIN — PHENYLEPHRINE HYDROCHLORIDE 100 MCG: 10 INJECTION INTRAVENOUS at 13:11

## 2025-02-19 RX ADMIN — PHENYLEPHRINE HYDROCHLORIDE 200 MCG: 10 INJECTION INTRAVENOUS at 13:14

## 2025-02-19 RX ADMIN — Medication 5 MG: at 21:32

## 2025-02-19 RX ADMIN — ATORVASTATIN CALCIUM 40 MG: 40 TABLET, FILM COATED ORAL at 21:32

## 2025-02-19 RX ADMIN — PROPOFOL 30 MG: 10 INJECTION, EMULSION INTRAVENOUS at 13:00

## 2025-02-19 RX ADMIN — DEXMEDETOMIDINE HYDROCHLORIDE 8 MCG: 100 INJECTION, SOLUTION INTRAVENOUS at 13:03

## 2025-02-19 RX ADMIN — SODIUM CHLORIDE: 9 INJECTION, SOLUTION INTRAVENOUS at 12:45

## 2025-02-19 RX ADMIN — ACETAMINOPHEN 975 MG: 325 TABLET, FILM COATED ORAL at 06:07

## 2025-02-19 RX ADMIN — PROPOFOL 100 MCG/KG/MIN: 10 INJECTION, EMULSION INTRAVENOUS at 12:53

## 2025-02-19 RX ADMIN — SENNOSIDES AND DOCUSATE SODIUM 1 TABLET: 50; 8.6 TABLET ORAL at 08:45

## 2025-02-19 RX ADMIN — PHENYLEPHRINE HYDROCHLORIDE 200 MCG: 10 INJECTION INTRAVENOUS at 13:20

## 2025-02-19 RX ADMIN — METOPROLOL SUCCINATE 12.5 MG: 25 TABLET, EXTENDED RELEASE ORAL at 21:32

## 2025-02-19 RX ADMIN — CALCITRIOL CAPSULES 0.25 MCG 0.25 MCG: 0.25 CAPSULE ORAL at 21:32

## 2025-02-19 RX ADMIN — PHENYLEPHRINE HYDROCHLORIDE 100 MCG: 10 INJECTION INTRAVENOUS at 13:06

## 2025-02-19 RX ADMIN — PHENYLEPHRINE HYDROCHLORIDE 200 MCG: 10 INJECTION INTRAVENOUS at 13:09

## 2025-02-19 RX ADMIN — PANTOPRAZOLE SODIUM 40 MG: 40 TABLET, DELAYED RELEASE ORAL at 08:45

## 2025-02-19 RX ADMIN — MIDODRINE HYDROCHLORIDE 10 MG: 2.5 TABLET ORAL at 17:46

## 2025-02-19 RX ADMIN — LIDOCAINE HYDROCHLORIDE 40 MG: 20 INJECTION, SOLUTION INFILTRATION; PERINEURAL at 12:53

## 2025-02-19 RX ADMIN — CLOPIDOGREL BISULFATE 75 MG: 75 TABLET ORAL at 21:31

## 2025-02-19 RX ADMIN — ACETAMINOPHEN 975 MG: 325 TABLET, FILM COATED ORAL at 21:32

## 2025-02-19 ASSESSMENT — ACTIVITIES OF DAILY LIVING (ADL)
ADLS_ACUITY_SCORE: 71
ADLS_ACUITY_SCORE: 75
ADLS_ACUITY_SCORE: 72
ADLS_ACUITY_SCORE: 71
ADLS_ACUITY_SCORE: 75
ADLS_ACUITY_SCORE: 71
ADLS_ACUITY_SCORE: 75
ADLS_ACUITY_SCORE: 72
ADLS_ACUITY_SCORE: 71
ADLS_ACUITY_SCORE: 72
ADLS_ACUITY_SCORE: 71
ADLS_ACUITY_SCORE: 72
ADLS_ACUITY_SCORE: 72
ADLS_ACUITY_SCORE: 71
ADLS_ACUITY_SCORE: 75
ADLS_ACUITY_SCORE: 71
ADLS_ACUITY_SCORE: 71
ADLS_ACUITY_SCORE: 72

## 2025-02-19 ASSESSMENT — ENCOUNTER SYMPTOMS
SEIZURES: 0
DYSRHYTHMIAS: 0

## 2025-02-19 ASSESSMENT — LIFESTYLE VARIABLES: TOBACCO_USE: 0

## 2025-02-19 NOTE — PLAN OF CARE
Date & Time: 02/18/25 [id number]     Summary:    1/21 Admitted for right leg pain and found to have right leg ulcer   1/23 Diagnostic angiogram   1/24 Thoracentesis   1/27 R common femoral and popliteal tibial endarterectomy, R femoral to popliteal/ tibial PTFE bypass- ICU for mechanical Ventilation  2/2 Thoracentesis   2/11 from R transmetatarsal amputation.   2/12 Tunneled dialysis catheter placement     Behavior & Aggression: Green   Fall Risk: Yes   Orientation: A&Ox4  ABNL VS/O2:VSS on RA  ABNL Labs: see chart.  Pain Management: Scheduled Meds  Bowel/Bladder: Urinal at bedside, Pt on Hemodialysis  IV/Drains: PIV SL  Skin: RLE incision sites are C/D/I. R foot amputation covered with Ace dressing. Black heel/malleolus wounds. Blanchable redness to coccyx R groin site w/ old hematoma. Peritoneal site. Doppler pulses intact.  Diet: Low K+ diet  Activity Level: not OOB, up w/ lift, Ax2w/ cares, T&R q2hrs as tolerated.  Tests/Procedures: N/A  Anticipated  DC Date: TBD needs TCU placement  Significant information: Tolerating Low K+ Diet. Dialysis today 2/18, 2L removed.  SW working on TCU. Per nephrology pt needs PD catheter removed prior to discharge. Vascular (primary) notified holding warfarin.

## 2025-02-19 NOTE — PROGRESS NOTES
Renal Medicine Chart Check      Following for ESRD  Has converting from [facility]CPD to HD    Dialyzed 02.18.25 without issue  UF = 2000 ml    Comfortable  No supplemental O2 requirement  K is fine    PD catheter removal today  HD in am           Recent Labs   Lab 02/19/25  0733 02/18/25  0750   POTASSIUM 4.7 6.0*           GENEVA Ray    [facility]  [phone]

## 2025-02-19 NOTE — ANESTHESIA POSTPROCEDURE EVALUATION
Patient: Arnulfo Pritchett    Procedure: Procedure(s):  REMOVAL, CATHETER, DIALYSIS, PERITONEAL       Anesthesia Type:  MAC    Note:  Disposition: Inpatient   Postop Pain Control: Uneventful            Sign Out: Well controlled pain   PONV: No   Neuro/Psych: Uneventful            Sign Out: Acceptable/Baseline neuro status   Airway/Respiratory: Uneventful            Sign Out: Acceptable/Baseline resp. status   CV/Hemodynamics: Uneventful            Sign Out: Acceptable CV status; No obvious hypovolemia; No obvious fluid overload   Other NRE: NONE   DID A NON-ROUTINE EVENT OCCUR? No           Last vitals:  Vitals Value Taken Time   BP 90/66 02/19/25 1430   Temp 36.7  C (98  F) 02/19/25 1430   Pulse 80 02/19/25 1430   Resp 18 02/19/25 1430   SpO2 98 % 02/19/25 1430       Electronically Signed By: Ileana Rosario MD  February 19, 2025  3:32 PM

## 2025-02-19 NOTE — ANESTHESIA CARE TRANSFER NOTE
Patient: Arnulfo Pritchett    Procedure: Procedure(s):  REMOVAL, CATHETER, DIALYSIS, PERITONEAL       Diagnosis: Chronic kidney disease, stage IV (severe) (H) [N18.4]  Diagnosis Additional Information: No value filed.    Anesthesia Type:   No value filed.     Note:    Oropharynx: oropharynx clear of all foreign objects and spontaneously breathing  Level of Consciousness: awake and drowsy  Oxygen Supplementation: face mask  Level of Supplemental Oxygen (L/min / FiO2): 6  Independent Airway: airway patency satisfactory and stable  Dentition: dentition unchanged  Vital Signs Stable: post-procedure vital signs reviewed and stable  Report to RN Given: handoff report given  Patient transferred to: PACU  Comments: Pt slightly hypotensive (from pt's normotension). Dr. Rosario notified and to bedside, post-op management of BP discussed between Dr. Rosario and PACU RN.   Handoff Report: Identifed the Patient, Identified the Reponsible Provider, Reviewed the pertinent medical history, Discussed the surgical course, Reviewed Intra-OP anesthesia mangement and issues during anesthesia, Set expectations for post-procedure period and Allowed opportunity for questions and acknowledgement of understanding      Vitals:  Vitals Value Taken Time   BP 78/56 02/19/25 1342   Temp 35.8  C (96.4  F) 02/19/25 1330   Pulse 81 02/19/25 1342   Resp 18 02/19/25 1342   SpO2 99 % 02/19/25 1342       Electronically Signed By: KEITH Combs CRNA  February 19, 2025  1:46 PM

## 2025-02-19 NOTE — PLAN OF CARE
Date & Time: 2/19/2025 1234-0775  Summary:    1/21 Admitted for right leg pain and found to have right leg ulcer   1/23 Diagnostic angiogram   1/24 Thoracentesis   1/27 R common femoral and popliteal tibial endarterectomy, R femoral to popliteal/ tibial PTFE bypass- ICU for mechanical Ventilation  2/2 Thoracentesis   2/11 from R transmetatarsal amputation.   2/12 Tunneled dialysis catheter placement  Behavior & Aggression: Green  Fall Risk: Yes  Orientation:A&O X4  ABNL VS/O2:VSS RA  ABNL Labs: see results  Pain Management:Scheduled Tylenol  Bowel/Bladder: Voiding in urinal, +BS, +gas, -BM this shift  Drains: PIV SL  Wounds/incisions: R foot amputation covered with Ace dressing. Black heel/malleolus wounds. Blanchable redness to coccyx R groin site w/ old hematoma. Peritoneal site. Doppler pulses intact.  Diet:Low K+  Activity Level: A2 with Lift, Q2 turn and repo as tolerated  Tests/Procedures: n/a  Anticipated  DC Date: Pending TCU placement  Significant Information: 2L removed during dialysis 2/18. PD catheter to be removed prior to dc

## 2025-02-19 NOTE — PROGRESS NOTES
Care Management Follow Up    Length of Stay (days): 29    Expected Discharge Date: 02/21/2025     Concerns to be Addressed: discharge planning     Patient plan of care discussed at interdisciplinary rounds: Yes    Anticipated Discharge Disposition: Skilled Nursing Facility        Anticipated Discharge Services:    Anticipated Discharge DME:      Patient/family educated on Medicare website which has current facility and service quality ratings:    Education Provided on the Discharge Plan:    Patient/Family in Agreement with the Plan: yes    Referrals Placed by CM/SW:    Private pay costs discussed: Not applicable    Discussed  Partnership in Safe Discharge Planning  document with patient/family: No     Handoff Completed: Yes, non-[facility] PCP: External handoff communication completed    Additional Information:  Writer sent a message to [facility]iaison to inquire about a room at [facility] and if they do hemodialysis     Next Steps: Find accepting facility       [place] is an option need to discuss with family as chair time for hemodialysis coming up in future     KATELYN Duarte

## 2025-02-19 NOTE — PROGRESS NOTES
VASCULAR SURGERY    No new issues.  Dialyzing now via right jugular tunneled catheter.  Unremarkable dialysis yesterday.    Stable right groin hematoma.  Right bypass graft remains patent.    Abdomen= soft, nontender.  PD catheter in place.      IMPRESSION: #1.  Stable from vascular standpoint.  Still multiple issues such as large heel ulcer.  Status post TMA that appears to be healing.            Stable right groin hematoma with small open incision but no evidence of infection.  Plan conservative treatment.       #2.  Will need PD catheter removed in operating room.  Will see if we can do this later this afternoon.  Check morning INR.  Would probably give 1 dose of vitamin K IV.  INR does not need to be normalized for this procedure.    Discussed plan with patient.       Patrick Hein MD

## 2025-02-19 NOTE — ANESTHESIA PREPROCEDURE EVALUATION
Anesthesia Pre-Procedure Evaluation    Patient: Arnulfo Pritchett   MRN: [id number] : 1960        Procedure : Procedure(s):  REMOVAL, CATHETER, DIALYSIS, PERITONEAL          Past Medical History:   Diagnosis Date    CAD (coronary artery disease)     Chronic systolic heart failure (H)     ESRD (end stage renal disease) on dialysis (H)     Gastroesophageal reflux disease without esophagitis     Gout     HLD (hyperlipidemia)     TALI (obstructive sleep apnea)     PAD (peripheral artery disease)     S/p RLE stenting of SFA/popliteal arteries    Type 2 diabetes mellitus with diabetic neuropathy (H)       Past Surgical History:   Procedure Laterality Date    AMPUTATE FOOT Left 2025    Procedure: Right foot transmetatarsal amputation;  Surgeon: Alyce Salas DPM;  Location: [facility] OR    AMPUTATE TOE(S) Left 10/27/2024    Procedure: AMPUTATION, TOE left great toe;  Surgeon: Haley Joseph DPM, Podiatry/Foot and Ankle Surgery;  Location: [facility] OR    BYPASS GRAFT FEMOROPOPLITEAL Right 2025    Procedure: RIGHT FEMORAL ARTERY TO RIGHT POPLITEAL ARTERY ENDARTERECTOMY,  COMMON FEMORAL TO POPLITEAL BELOW KNEE COMPOSITE GREATER SAPHENOUS/PTFE BYPASS GRAFT. GREATER SAPHENOUS VEIN HARVEST;  Surgeon: Patrick Hein MD;  Location: [facility] OR    BYPASS GRAFT FEMOROTIBIAL Left 10/25/2024    Procedure: LEFT FEMORAL ENDARTERECTOMY, LEFT FEMORAL TO TIBIAL PERONEAL TRUNK BYPASS WITH LEFT GREAT SEPHANEOUS VEIN, WOUND VAC PLACEMENT ON LEFT GROIN.;  Surgeon: Raul Gray MD;  Location: [facility] OR    BYPASS GRAFT FEMOROTIBIAL Left 10/27/2024    Procedure: CREATION, BYPASS, VASCULAR, FEMORAL TO TIBIAL REVISION OF BYPASS LEFT COMMON FEMORAL TO TIBIAL.;  Surgeon: Raul Gray MD;  Location:  OR    CV CORONARY ANGIOGRAM N/A 2024    Procedure: Coronary Angiogram;  Surgeon: Clem Matt MD;  Location:  [facility]    CV LOWER EXTREMITY ANGIOGRAM LEFT Left 10/27/2024    Procedure:  Angiogram Lower Extremity Left;  Surgeon: Raul Gray MD;  Location:  OR    CV PCI N/A 11/27/2024    Procedure: Percutaneous Coronary Intervention;  Surgeon: Clem Matt MD;  Location:  [facility]    IR CVC TUNNEL PLACEMENT > 5 YRS OF AGE  2/12/2025    IR LOWER EXTREMITY ANGIOGRAM LEFT  10/17/2024    IR LOWER EXTREMITY ANGIOGRAM RIGHT  1/23/2025    OR ANGIOGRAM, LOWER EXTREMITY Right 1/27/2025    Procedure: INTRAOPERATIVE ANGIOGRAM;  Surgeon: Patrick Hein MD;  Location:  OR      No Known Allergies   Social History     Tobacco Use    Smoking status: Former     Types: Cigarettes    Smokeless tobacco: Never   Substance Use Topics    Alcohol use: Not Currently     Comment: quit 20 years      Wt Readings from Last 1 Encounters:   02/19/25 83 kg (182 lb 15.7 oz)          Prior to Admission medications    Medication Sig Start Date End Date Taking? Authorizing Provider   acetaminophen (TYLENOL) 500 MG tablet Take 1,000 mg by mouth every 8 hours as needed.   Yes Reported, Patient   allopurinol (ZYLOPRIM) 100 MG tablet Take 200 mg by mouth every evening 3/1/23  Yes Reported, Patient   atorvastatin (LIPITOR) 40 MG tablet Take 40 mg by mouth at bedtime. 1/4/23  Yes Reported, Patient   B Complex-C-Folic Acid (DIALYVITE) TABS Take 1 tablet by mouth daily. 7/22/24  Yes Reported, Patient   calcitRIOL (ROCALTROL) 0.25 MCG capsule Take 0.25 mcg by mouth at bedtime. 2/27/24  Yes Reported, Patient   cinacalcet (SENSIPAR) 60 MG tablet Take 60 mg by mouth. Take 1 tablet (60 mg) three times a week: Monday, Wednesday, and Friday nights   Yes Unknown, Entered By History   clopidogrel (PLAVIX) 75 MG tablet Take 1 tablet (75 mg) by mouth every evening. 12/27/24  Yes Chai Griffin MD   gentamicin (GARAMYCIN) 0.1 % external cream Apply topically daily as needed. Apply topically on peritoneal access site to prevent infections 11/28/23  Yes Reported, Patient   glucose 40 % (400 mg/mL) gel Take  15-30 g by mouth every 15 minutes as needed for low blood sugar. 11/7/24  Yes Rafi Mixon MD   insulin glargine (LANTUS PEN) 100 UNIT/ML pen Inject 35 Units subcutaneously at bedtime. 12/6/24  Yes Ang Palmer MD   melatonin 5 MG tablet Take 5 mg by mouth at bedtime   Yes Reported, Patient   metoprolol succinate ER (TOPROL XL) 25 MG 24 hr tablet Take 1 tablet (25 mg) by mouth daily. 12/27/24  Yes Chai Griffin MD   midodrine (PROAMATINE) 2.5 MG tablet Take 1 tablet (2.5 mg) by mouth once as needed (hypotension/dizziness post PD in AM during the day). 12/3/24  Yes Leonor Haddad MD   omeprazole (PRILOSEC) 20 MG DR capsule Take 20 mg by mouth daily.   Yes Unknown, Entered By History   SEVELAMER CARBONATE PO Take 800 mg by mouth 3 times daily (with meals)   Yes Reported, Patient   warfarin ANTICOAGULANT (COUMADIN) 5 MG tablet Take 7.5 mg by mouth twice a week. Mondays and Fridays..   Yes Unknown, Entered By History   warfarin ANTICOAGULANT (COUMADIN) 5 MG tablet Take 1 tablet (5 mg) by mouth daily.  Patient taking differently: Take 5 mg by mouth five times a week. Tues, Wed, Thurs, Saturdays and Sundays. 12/27/24  Yes Chai Griffin MD   Continuous Glucose Sensor (DEXCOM G7 SENSOR) MISC Change every 10 days. 1/21/25   Chai Griffin MD     ECG 2/15/25: Sinus tachycardia with occasional Premature ventricular complexes   Incomplete right bundle branch block   Left anterior fascicular block     ECHO 1/31/25: V is mildly dilated based on volumes. Visually estimated ejection fraction is  around 25 to 30%. There is severe global hypokinesis with abnormal septal  motion consistent with conduction abnormality. Severe inferior wall  hypokinesis is noted.  Increaesed filling pressures on doppler with LVH is noted.  Right ventricle is mildly dilated with mildly reduced systolic function.  Thickening of the mitral valve leaflets is noted. There is likely mild mitral  regurgitation along with mild perhaps mild to  moderate mitral stenosis with a  mean inflow gradient of 4 mmHg.  Aortic valve leaflets are thickened with reduced systolic excursion. Mean  gradient is 15 mmHg and valve area calculates at 1.3 cmÂ . Probably moderate  aortic stenosis which likely underestimated gradient based on Doppler due to  LV dysfunction and low-flow low gradient state.  Aortic root is normal in size but visualized proximal ascending aorta is  borderline mildly dilated at 4 cm.  Diastolic Doppler findings (E/E' ratio and/or other parameters) suggest left  ventricular filling pressures are increased.  ______________________________________________________________________________  Left Ventricle  The left ventricle is mildly dilated. There is mild to moderate eccentric left  ventricular hypertrophy. The visual ejection fraction is 25-30%. Diastolic  Doppler findings (E/E' ratio and/or other parameters) suggest left ventricular  filling pressures are increased. There is severe global hypokinesia of the  left ventricle. Septal motion is consistent with conduction abnormality. There  are regional wall motion abnormalities as specified.     Right Ventricle  The right ventricle is mildly dilated. The right ventricular systolic function  is mildly reduced.     Atria  The left atrium is mildly dilated. The right atrium is mild to moderately  dilated.     Mitral Valve  The mitral valve leaflets appear thickened, but open well. There is mild (1+)  mitral regurgitation. The mean mitral valve gradient is 4.0 mmHg. The peak  mitral valve gradient is 10.9 mmHg. Calcified mitral apparatus causing mitral  stenosis. There is mild to moderate mitral stenosis.     Aortic Valve  There is severe trileaflet aortic sclerosis. There is trace aortic  regurgitation. Increased aortic valve velocity. The peak AoV pressure gradient  is 22.0 mmHg. The mean AoV pressure gradient is 15.2 mmHg. The calculated  aortic valve are is 1.3 cm^2. Moderate valvular aortic stenosis.      Pulmonic Valve  The pulmonic valve is not well visualized.     Vessels  The aortic root is normal size. Ascending aorta dilatation is present. 4 cms.  IVC diameter and respiratory changes fall into an intermediate range  suggesting an RA pressure of 8 mmHg.     Pericardium  There is no pericardial effusion.      Anesthesia Evaluation   Pt has had prior anesthetic. Type: General.    No history of anesthetic complications       ROS/MED HX  ENT/Pulmonary: Comment: Recent right pleural effusion s/p thoracentesis on 1/24/25. Patient reports increased WOB.    (+) sleep apnea,                             recent URI, resolved, on 1/3, s/p treatment:     (-) tobacco use and asthma   Neurologic:     (+)          CVA,    TIA,  features: no residual symptoms.             (-) no seizures and migraines   Cardiovascular: Comment: Critical limb ischemia    (+) Dyslipidemia hypertension- Peripheral Vascular Disease-- Symptomatic.  CAD -  - stent-      CHF etiology: ICM Last EF: 20-25 date: 6/2024                          (-) [place], arrhythmias, valvular problems/murmurs and murmur   METS/Exercise Tolerance:     Hematologic:     (+)      anemia,       (-) history of blood clots   Musculoskeletal: Comment: gout   (-) arthritis   GI/Hepatic:     (+) GERD,                (-) liver disease   Renal/Genitourinary: Comment: S/p right nephrectomy d/t RCC    (+) renal disease, type: ESRD, Pt requires dialysis, type: Peritoneal dialysis,       (-) nephrolithiasis   Endo:     (+)  type II DM, Last HgA1c: 5.7, date: 10/2024,               (-) Type I DM, thyroid disease and obesity   Psychiatric/Substance Use:    (-) psychiatric history   Infectious Disease:    (-) Recent Fever   Malignancy: Comment: Renal cell carcinoma status post right nephrectomy  (+) Malignancy, History of Other.Other CA RCC s/p right nephrectomy Remission status post Surgery.    Other:            Physical Exam    Airway        Mallampati: III   TM distance: > 3 FB   Neck  "ROM: full   Mouth opening: > 3 cm    Respiratory Devices and Support         Dental       (+) Edentulous      Cardiovascular   cardiovascular exam normal       Rhythm and rate: regular and normal (-) no murmur    Pulmonary   pulmonary exam normal        breath sounds clear to auscultation           OUTSIDE LABS:  CBC:   Lab Results   Component Value Date    WBC 13.6 (H) 02/17/2025    WBC 11.4 (H) 02/16/2025    HGB 8.8 (L) 02/17/2025    HGB 7.9 (L) 02/16/2025    HCT 27.9 (L) 02/17/2025    HCT 24.7 (L) 02/16/2025     02/17/2025     02/16/2025     BMP:   Lab Results   Component Value Date     (L) 02/19/2025     (L) 02/18/2025    POTASSIUM 4.7 02/19/2025    POTASSIUM 6.0 (H) 02/18/2025    CHLORIDE 95 (L) 02/19/2025    CHLORIDE 90 (L) 02/18/2025    CO2 26 02/19/2025    CO2 23 02/18/2025    BUN 27.1 (H) 02/19/2025    BUN 51.8 (H) 02/18/2025    CR 3.36 (H) 02/19/2025    CR 4.98 (H) 02/18/2025     (H) 02/19/2025     (H) 02/19/2025     COAGS:   Lab Results   Component Value Date    PTT 42 (H) 01/27/2025    INR 2.82 (H) 02/19/2025     POC: No results found for: \"BGM\", \"HCG\", \"HCGS\"  HEPATIC:   Lab Results   Component Value Date    ALBUMIN 1.6 (L) 02/12/2025    PROTTOTAL 4.7 (L) 02/02/2025    ALT 6 01/30/2025    AST 12 01/30/2025    ALKPHOS 115 01/30/2025    BILITOTAL 0.2 01/30/2025     OTHER:   Lab Results   Component Value Date    PH 7.31 (L) 02/01/2025    LACT 0.6 (L) 02/18/2025    A1C 6.6 (H) 01/31/2025    TERENCE 9.3 02/19/2025    PHOS 4.0 02/19/2025    MAG 2.1 02/19/2025    TSH 6.45 (H) 11/27/2024    T4 1.38 11/27/2024    SED 86 (H) 01/21/2025       Anesthesia Plan    ASA Status:  4    NPO Status:  NPO Appropriate    Anesthesia Type: MAC.     - Reason for MAC: straight local not clinically adequate              Consents    Anesthesia Plan(s) and associated risks, benefits, and realistic alternatives discussed. Questions answered and patient/representative(s) expressed understanding.    "  - Discussed:     - Discussed with:  Patient            Postoperative Care    Pain management: Multi-modal analgesia.   PONV prophylaxis: Ondansetron (or other 5HT-3)     Comments:               Ileana Rosario MD    I have reviewed the pertinent notes and labs in the chart from the past 30 days and (re)examined the patient.  Any updates or changes from those notes are reflected in this note.    Clinically Significant Risk Factors        # Hyperkalemia: Highest K = 6 mmol/L in last 2 days, will monitor as appropriate  # Hyponatremia: Lowest Na = 127 mmol/L in last 2 days, will monitor as appropriate  # Hypochloremia: Lowest Cl = 90 mmol/L in last 2 days, will monitor as appropriate      # Hypoalbuminemia: Lowest albumin = 1.6 g/dL at 2/12/2025  7:26 AM, will monitor as appropriate     # Hypertension: Noted on problem list  # Chronic heart failure with reduced ejection fraction: last echo with EF <40%          # DMII: A1C = 6.6 % (Ref range: <5.7 %) within past 6 months    # Severe Malnutrition: based on nutrition assessment    # Financial/Environmental Concerns:

## 2025-02-19 NOTE — OR NURSING
Dr Rosario at bedside, pt arousable, BPs remain soft 78/54, HR consistently in 80's, no new orders at this time.

## 2025-02-19 NOTE — OP NOTE
OPERATIVE NOTE    PROCEDURE DATE: 2/19/2025      PRE-OP DIAGNOSIS: End-stage renal disease on dialysis      POST-OP DIAGNOSES: Same      PROCEDURE PERFORMED: Removal peritoneal dialysis catheter      SURGEON:  Patrick Hein M.D.      ASSISTANT:  yanna Castillo      ANESTHESIA:  Local with MAC      PRE-OP MEDICATIONS: None       INDICATIONS FOR PROCEDURE: 64-year-old patient with multiple medical problems has been peritoneal dialysis.  Right jugular tunneled catheter has been placed has been functioning well.  Were asked to remove his peritoneal dialysis catheter.  There is no evidence of infection or other complications.  He comes the operating today under informed consent.      DESCRIPTION OF PROCEDURE: Brought the arm placed supine.  The abdomen and catheter were prepped and draped.  Timeout was called and the sites were identified.  0.5% Marcaine was injected field block fashion.  A vertical incision was made more medially to the catheter exit site and dissection was carried down electrocautery to identify the catheter.  The subcutaneous cuff was easily removed and very well incorporated.  We opened the fascia over the rectus muscle and dissected down to the second very well incorporated cuff in the muscle which was dissected free with electrocautery.  The curl catheter was then removed in its entirety with no difficulty.  Fascia was approximated with interrupted 3-0 Vicryl.  Subcutaneous tissue closed with interrupted 3-0 Vicryl.  Skin closed with 4-0 Monocryl in subcuticular fashion.  Due to the patient's anticoagulation we did not excise the skin exit site especially since there was no infection.  Tegaderm was applied over the surgical incision.      Patient tolerated procedure well.      EBL: Less than 2 mL       COMPLICATIONS: None      OPERATIVE FINDINGS: Very well incorporated cuff with complete removal of peritoneal dialysis catheter      Patrick Hein MD

## 2025-02-20 VITALS
DIASTOLIC BLOOD PRESSURE: 72 MMHG | TEMPERATURE: 97.7 F | OXYGEN SATURATION: 92 % | WEIGHT: 168.43 LBS | SYSTOLIC BLOOD PRESSURE: 100 MMHG | BODY MASS INDEX: 22.22 KG/M2 | RESPIRATION RATE: 16 BRPM | HEART RATE: 102 BPM

## 2025-02-20 LAB
ANION GAP SERPL CALCULATED.3IONS-SCNC: 13 MMOL/L (ref 7–15)
BUN SERPL-MCNC: 35.3 MG/DL (ref 8–23)
CALCIUM SERPL-MCNC: 9.2 MG/DL (ref 8.8–10.4)
CHLORIDE SERPL-SCNC: 95 MMOL/L (ref 98–107)
CREAT SERPL-MCNC: 4.33 MG/DL (ref 0.67–1.17)
EGFRCR SERPLBLD CKD-EPI 2021: 14 ML/MIN/1.73M2
ERYTHROCYTE [DISTWIDTH] IN BLOOD BY AUTOMATED COUNT: 21.1 % (ref 10–15)
GLUCOSE BLDC GLUCOMTR-MCNC: 122 MG/DL (ref 70–99)
GLUCOSE BLDC GLUCOMTR-MCNC: 163 MG/DL (ref 70–99)
GLUCOSE BLDC GLUCOMTR-MCNC: 82 MG/DL (ref 70–99)
GLUCOSE SERPL-MCNC: 114 MG/DL (ref 70–99)
HBV SURFACE AB SERPL IA-ACNC: 3.92 M[IU]/ML
HBV SURFACE AB SERPL IA-ACNC: NONREACTIVE M[IU]/ML
HBV SURFACE AG SERPL QL IA: NONREACTIVE
HCO3 SERPL-SCNC: 24 MMOL/L (ref 22–29)
HCT VFR BLD AUTO: 27.6 % (ref 40–53)
HGB BLD-MCNC: 8.7 G/DL (ref 13.3–17.7)
INR PPP: 1.5 (ref 0.85–1.15)
MAGNESIUM SERPL-MCNC: 2.2 MG/DL (ref 1.7–2.3)
MCH RBC QN AUTO: 29.8 PG (ref 26.5–33)
MCHC RBC AUTO-ENTMCNC: 31.5 G/DL (ref 31.5–36.5)
MCV RBC AUTO: 95 FL (ref 78–100)
PHOSPHATE SERPL-MCNC: 4.8 MG/DL (ref 2.5–4.5)
PLATELET # BLD AUTO: 441 10E3/UL (ref 150–450)
POTASSIUM SERPL-SCNC: 5.7 MMOL/L (ref 3.4–5.3)
RBC # BLD AUTO: 2.92 10E6/UL (ref 4.4–5.9)
SODIUM SERPL-SCNC: 132 MMOL/L (ref 135–145)
WBC # BLD AUTO: 11.8 10E3/UL (ref 4–11)

## 2025-02-20 PROCEDURE — 250N000011 HC RX IP 250 OP 636: Performed by: STUDENT IN AN ORGANIZED HEALTH CARE EDUCATION/TRAINING PROGRAM

## 2025-02-20 PROCEDURE — 120N000001 HC R&B MED SURG/OB

## 2025-02-20 PROCEDURE — 250N000013 HC RX MED GY IP 250 OP 250 PS 637

## 2025-02-20 PROCEDURE — 80048 BASIC METABOLIC PNL TOTAL CA: CPT

## 2025-02-20 PROCEDURE — 86706 HEP B SURFACE ANTIBODY: CPT | Performed by: INTERNAL MEDICINE

## 2025-02-20 PROCEDURE — 90935 HEMODIALYSIS ONE EVALUATION: CPT | Performed by: INTERNAL MEDICINE

## 2025-02-20 PROCEDURE — 84100 ASSAY OF PHOSPHORUS: CPT

## 2025-02-20 PROCEDURE — 82310 ASSAY OF CALCIUM: CPT

## 2025-02-20 PROCEDURE — 87340 HEPATITIS B SURFACE AG IA: CPT

## 2025-02-20 PROCEDURE — 250N000013 HC RX MED GY IP 250 OP 250 PS 637: Performed by: STUDENT IN AN ORGANIZED HEALTH CARE EDUCATION/TRAINING PROGRAM

## 2025-02-20 PROCEDURE — 250N000011 HC RX IP 250 OP 636: Performed by: INTERNAL MEDICINE

## 2025-02-20 PROCEDURE — 36415 COLL VENOUS BLD VENIPUNCTURE: CPT

## 2025-02-20 PROCEDURE — 85027 COMPLETE CBC AUTOMATED: CPT | Performed by: STUDENT IN AN ORGANIZED HEALTH CARE EDUCATION/TRAINING PROGRAM

## 2025-02-20 PROCEDURE — 83735 ASSAY OF MAGNESIUM: CPT

## 2025-02-20 PROCEDURE — 99232 SBSQ HOSP IP/OBS MODERATE 35: CPT | Performed by: STUDENT IN AN ORGANIZED HEALTH CARE EDUCATION/TRAINING PROGRAM

## 2025-02-20 PROCEDURE — 90937 HEMODIALYSIS REPEATED EVAL: CPT

## 2025-02-20 PROCEDURE — 36415 COLL VENOUS BLD VENIPUNCTURE: CPT | Performed by: STUDENT IN AN ORGANIZED HEALTH CARE EDUCATION/TRAINING PROGRAM

## 2025-02-20 PROCEDURE — 85610 PROTHROMBIN TIME: CPT

## 2025-02-20 PROCEDURE — 258N000003 HC RX IP 258 OP 636: Performed by: INTERNAL MEDICINE

## 2025-02-20 RX ORDER — HEPARIN SODIUM 10000 [USP'U]/100ML
0-5000 INJECTION, SOLUTION INTRAVENOUS CONTINUOUS
Status: DISCONTINUED | OUTPATIENT
Start: 2025-02-20 | End: 2025-02-23

## 2025-02-20 RX ADMIN — SEVELAMER CARBONATE 800 MG: 800 TABLET, FILM COATED ORAL at 13:15

## 2025-02-20 RX ADMIN — Medication: at 12:20

## 2025-02-20 RX ADMIN — SODIUM CHLORIDE 250 ML: 9 INJECTION, SOLUTION INTRAVENOUS at 08:28

## 2025-02-20 RX ADMIN — ATORVASTATIN CALCIUM 40 MG: 40 TABLET, FILM COATED ORAL at 20:44

## 2025-02-20 RX ADMIN — ACETAMINOPHEN 975 MG: 325 TABLET, FILM COATED ORAL at 21:03

## 2025-02-20 RX ADMIN — ACETAMINOPHEN 975 MG: 325 TABLET, FILM COATED ORAL at 06:12

## 2025-02-20 RX ADMIN — MIDODRINE HYDROCHLORIDE 10 MG: 2.5 TABLET ORAL at 08:02

## 2025-02-20 RX ADMIN — METOPROLOL SUCCINATE 12.5 MG: 25 TABLET, EXTENDED RELEASE ORAL at 20:46

## 2025-02-20 RX ADMIN — CLOPIDOGREL BISULFATE 75 MG: 75 TABLET ORAL at 20:45

## 2025-02-20 RX ADMIN — PANTOPRAZOLE SODIUM 40 MG: 40 TABLET, DELAYED RELEASE ORAL at 08:03

## 2025-02-20 RX ADMIN — SODIUM CHLORIDE 200 ML: 9 INJECTION, SOLUTION INTRAVENOUS at 08:25

## 2025-02-20 RX ADMIN — WARFARIN SODIUM 7.5 MG: 5 TABLET ORAL at 17:27

## 2025-02-20 RX ADMIN — SENNOSIDES AND DOCUSATE SODIUM 1 TABLET: 50; 8.6 TABLET ORAL at 20:47

## 2025-02-20 RX ADMIN — HEPARIN SODIUM 900 UNITS/HR: 10000 INJECTION, SOLUTION INTRAVENOUS at 20:57

## 2025-02-20 RX ADMIN — Medication 5 MG: at 20:46

## 2025-02-20 RX ADMIN — MIDODRINE HYDROCHLORIDE 10 MG: 2.5 TABLET ORAL at 17:28

## 2025-02-20 RX ADMIN — Medication 1 CAPSULE: at 13:16

## 2025-02-20 RX ADMIN — HEPARIN SODIUM 1600 UNITS: 1000 INJECTION INTRAVENOUS; SUBCUTANEOUS at 12:05

## 2025-02-20 RX ADMIN — SEVELAMER CARBONATE 800 MG: 800 TABLET, FILM COATED ORAL at 17:28

## 2025-02-20 RX ADMIN — CALCITRIOL CAPSULES 0.25 MCG 0.25 MCG: 0.25 CAPSULE ORAL at 20:47

## 2025-02-20 RX ADMIN — SENNOSIDES AND DOCUSATE SODIUM 1 TABLET: 50; 8.6 TABLET ORAL at 13:15

## 2025-02-20 ASSESSMENT — ACTIVITIES OF DAILY LIVING (ADL)
ADLS_ACUITY_SCORE: 67
ADLS_ACUITY_SCORE: 72
ADLS_ACUITY_SCORE: 67
ADLS_ACUITY_SCORE: 72
ADLS_ACUITY_SCORE: 67
ADLS_ACUITY_SCORE: 72
ADLS_ACUITY_SCORE: 67
ADLS_ACUITY_SCORE: 72

## 2025-02-20 NOTE — PROGRESS NOTES
[facility]    Medicine Progress Note - Hospitalist Service    Date of Admission:  1/21/2025    Assessment & Plan   Arnulfo Pritchett is a 64 year old male with past medical history of severe heart failure (EF 30%), ESRD (on peritoneal dialysis), chronic paroxysmal AF on warfarin, LLE PAD s/p endarterectomy, RCC s/p nephrectomy admitted on 1/21/2025 for septic shock secondary to right leg PAD with worsening right heel necrotic ulcer. The patient was seen in ED at outside hospital on 1/3/25 and diagnosed with pneumonia, later completing course of Cefdinir. He then presented on 1/21/25 to outside hospital for right leg pain and found to have right leg ulcer and transferred to [facility] for vascular surgery evaluation. Now s/p right common femoral BK popliteal/tibial endarterectomy and right common femoral to below-knee popliteal/tibial PTFE bypass performed on 1/27/25. The patient was admitted to the ICU for requirement of mechanical ventilation and pressor support following surgery for multifactorial shock.       Hospitalist service consulted 1/31/2025 for transfer out of ICU and to assist with medical management along with vascular surgery, podiatry and Infectious disease.     Hx PAD of LLE s/p endarterectomy and fem-tibioperoneal trunk bypass on 10/25/24  Hx RLE arterial occlusion s/p SFA angioplasty and stent 5/2024  PAD RLE, right heel gangrene, occluded SFA, no evidence of osteomyelitis  s/p right common femoral and popliteal/tibial endarterectomy, right femoral to popliteal/tibial PTFE bypass 1/27/25   # Right groin hematoma resolving   -Found to have critical limb ischemia on admission on January 21, for details see admission note.   -Distributive shock following surgery followed by the ICU service until 1/31.  -Followed by vascular surgery, podiatry, wound care, and ID.  -Coumadin has been held since admission, was was started on IV heparin drip since admission.  -Restarted warfarin on  2/14/2025 after discussion with vascular surgery.  INR now in goal range, heparin gtt discontinued.  -Has been maintained on statin and Plavix 75 mg daily during this hospital stay.  -Recent IV piperacillin/tazobactam (Zosyn), discontinued 2/2 per ID after 12 days of Zosyn; follow-up cultures, monitor off antibiotics.  -Wound care per surgery and WOC.  -pain control, see hospital orders.  -worsening ischemic changes in 2nd-5th toes of right foot.  -Underwent transmetatarsal amputation on 2/11/2025.  Tolerated procedure well.  Intraoperatively no signs of infection.  No further surgery per podiatry.  -Leukocytosis improved postsurgery.  Hemoglobin trended down to 7 on 2/13/2025 requiring transfusion, Hgb stable since then.     Multifactorial shock, resolved  ICU admission, weaned off vasopressors 1/30.  -Transitioned to midodrine with increase in prior to admission dose on1/31.  -Monitor vitals     End-stage renal disease (ESRD), on peritoneal dialysis  Hx of secondary hyperparathyroidism and hyperphosphatemia, phosphorous elevated above baseline of 6 at most recent of 7.9  Nephrology following.  -Chronic peritoneal dialysis for the last 3.5 years. Patient expressed interest to switch to HD. Nephrology discussed with his primary nephrologist who agrees to transition to hemodialysis.  Underwent right tunneled IJ catheter placement by IR on 2/12/2025.  Now undergoing HD on MWF schedule.  - On 2/16, patient having some episodes of shortness of breath that last 30-60 seconds. ECG without any changes concerning for ACS. CXR shows moderate partially loculated R pleural effusion which is the same to slightly worse from his last CXR. Currently satting well on room air. Will restart albuterol inhaler that patient was using outpatient after his recent pneumonia and monitor for improvement after dialysis tomorrow, possible more volume needs to be taken off during HD. If no improvement with SOB after dialysis run tomorrow, would  place IR consult for repeat thoracentesis.  -Continue midodrine.  -Peritoneal dialysis catheter removed 12/19 by vascular surgery  -INR this morning at 2.82      acute hypoxemic respiratory failure, resolved  Right pleural effusion  Status post thoracentesis 1/24/25, repeat 2/2/25  -Following surgery on admission by vascular surgery was intubated.  Recent pneumonia treated with 3 weeks of Ceftin prior to this admission. Extubated 1/29/25  -Right pleural effusion.  Status post thoracentesis 1/24/25 with 1.5 L clear yellow fluid removed, likely transudative based on low cell count of 117, , and protein of 1.7. Possibly secondary to reduced peritoneal dialysis during hospitalization vs underlying severe HFrEF (30%).  -CT chest 2/1/25, see report, no clear pneumonia; large right pleural effusion noted.  -Now off oxygen. Encourage incentive spirometry.  -Antibiotics as above, discontinued by ID 2/2  -ID consulted 2/1 as above, recommend monitoring patient off antibiotics  -S/p repeat Right thoracentesis on 2/2 with 2.3 L of rolan fluid removed; additional studies, cultures (negative to date), cytology (negative for malignancy)      Paroxysmal atrial fibrillation  Chronic anticoagulation prior to admission, warfarin - ON HOLD  Ischemic cardiomyopathy with HLD, CAD s/p multiple stents, and severe HFrEF (30%)   Troponin elevation, likely type 2 MI  Wide complex tachycardia 2/1/25  Assessment-postoperatively has been maintained on heparin drip.  Coumadin has been held during his hospital stay.  -Echo 1/31-EF 25 to 30%.  Severe global hypokinesis with abnormal septal motion consistent with conduction abnormality.  Severe inferior wall hypokinesis.  LVH.  RV mildly dilated and mildly reduced RV function.  Mild mitral regurgitation.  Mild to moderate mitral stenosis.  Moderate aortic stenosis.  Left ventricular fairly pressures elevated.  -Episode of wide-complex tachycardia noted by nursing staff, 15 beats, up on  2/1/2025.  -Continue inpatient telemetry.  -EP consulted on 2/16 given tachycardia with rates to 120s. They evaluated patient and said not unexpected given patient's severe HFrEF.              - Recommended increasing Toprol XL to 12.5 mg BID (PTA was on 25 mg daily)  -Troponin elevated to 513 on 2/16, checked for report of shortness of breath and new incomplete RBBB on ECG on 2/16.     Coumadin held for PD catheter removal and currently INR is supratherapeutic at 3.35   -If INR is subtherapeutic will need heparin bridging    # Confusion  # Cognitive decline  -Will and order an MRI without contrast tomorrow         Anemia of chronic disease  Baseline around 10.  Has been stable in the 8 range. Did need 1 U PRBC on 2/8 and 2/13 for Hgb<7 without signs of active bleeding.  -Nephrology started patient on EPO on 2/9 and to continue weekly.  -Continue to monitor hemoglobin daily,       # Hyponatremia  Hyperkalemia  Continue hemodialysis      Hyperglycemia  Type 2 diabetes   hemoglobin A1c 5.7% October 24  PTA regimen: Lantus 35 units at bedtime  Insulin requirements have been decreasing and he started having hypoglycemia episodes after being started on HD on 2/13/2025  Discontinue Lantus and carb coverage  Continue sliding scale insulin. Discharge insulin dosing dependent on requirements from sliding scale.  Continue to monitor FSBS       Mixed anion gap acidosis, resolved  -Improved respiratory function, monitor.  Anion gap resolved 1/31.     Abdominal discomfort 1/30, resolved  Noted to have some right upper quadrant pain 1/30.  Now resolved.  LFTs normal.  Was on Zosyn for above surgical issues.   -Monitor abdominal exam.  -Antibiotics managed as noted above.     Moderate protein calorie nutrition  -Nutrition consulted.  -Speech and language therapy (SLP) consulted, diet per speech therapy.     Gout  -PTA allopurinol currently on hold, reassess daily.     History of renal cell carcinoma   -Status post right  "nephrectomy, monitor.     History of obstructive sleep apnea.  By report, intolerant of CPAP   -Monitor, follow-up with outpatient provider.     Weakness and deconditioning  -PT, OT, speech and language therapy (SLP) consulted.  -Increase activity as tolerated.  -Currently TCU recommended. If respiratory status improved and patient feeling better after HD run tomorrow, would be medically ready for discharge to TCU if cleared by vascular surgery as well.     Disposition  Anticipated discharge timing see Disposition Plan below.  -Anticipated discharge to transitional care unit.  -Social work consulted for discharge planning.             Goals of care discussion:  Patient had mentioned to nursing staff about \"wanting to die\", aand also mentioned to prior hospitalist he is tired of living like this and he did not think he was going to make it out of the hospital. Palliative consulted. When palliative saw patient the next day, patient appeared to be in a better mood and stated he wanted to continue restorative cares. He has however mentioned to Nephrology that he will like to switch from peritoneal dialysis to HD as it will make him feel less fatigued , if however HD does not help, then he would consider comfort measures.   Currently patient's goals are restorative and palliative has signed off               Diet: Snacks/Supplements Adult: Ensure Enlive; Between Meals  Advance Diet as Tolerated: Regular Diet Adult; Regular Diet Adult    DVT Prophylaxis: Warfarin  Rosario Catheter: Not present  Lines: PRESENT      CVC Double Lumen Right Internal jugular Non - valved (open ended);Tunneled-Site Assessment: WDL      Cardiac Monitoring: None  Code Status: Full Code      Clinically Significant Risk Factors        # Hyperkalemia: Highest K = 6 mmol/L in last 2 days, will monitor as appropriate  # Hyponatremia: Lowest Na = 127 mmol/L in last 2 days, will monitor as appropriate  # Hypochloremia: Lowest Cl = 90 mmol/L in last 2 " days, will monitor as appropriate      # Hypoalbuminemia: Lowest albumin = 1.6 g/dL at 2/12/2025  7:26 AM, will monitor as appropriate     # Hypertension: Noted on problem list  # Chronic heart failure with reduced ejection fraction: last echo with EF <40%          # DMII: A1C = 6.6 % (Ref range: <5.7 %) within past 6 months    # Severe Malnutrition: based on nutrition assessment    # Financial/Environmental Concerns:           Social Drivers of Health    Tobacco Use: Medium Risk (2/19/2025)    Patient History     Smoking Tobacco Use: Former     Smokeless Tobacco Use: Never   Alcohol Use: Unknown (11/28/2018)    Received [facility], [facility]    AUDIT-C     Frequency of Alcohol Consumption: Monthly or less     Frequency of Binge Drinking: Never   Transportation Needs: High Risk (1/21/2025)    Transportation Needs     Within the past 12 months, has lack of transportation kept you from medical appointments, getting your medicines, non-medical meetings or appointments, work, or from getting things that you need?: Yes   Physical Activity: Unknown (10/8/2024)    Exercise Vital Sign     Days of Exercise per Week: 0 days   Social Connections: Unknown (10/8/2024)    Social Connection and Isolation Panel [NHANES]     Frequency of Social Gatherings with Friends and Family: Patient declined          Disposition Plan     Medically Ready for Discharge: Ready Now             Elizabeth Slater MD  Hospitalist Service  [facility]  Securely message with [employer] (more info)  Text page via kompany Paging/Directory   ______________________________________________________________________    Interval History     No acute overnight events.  Patient seen after peritoneal dialysis catheter removed      {Physical Exam   Vital Signs: Temp: 97.3  F (36.3  C) Temp src: Oral BP: 94/61 Pulse: 84   Resp: 16 SpO2: 98 % O2 Device: Nasal  cannula Oxygen Delivery: 1 LPM  Weight: 182 lbs 15.71 oz    Physical Exam  Cardiovascular:      Rate and Rhythm: Normal rate and regular rhythm.   Pulmonary:      Effort: Pulmonary effort is normal. No respiratory distress.   Abdominal:      General: There is no distension.      Palpations: Abdomen is soft.      Tenderness: There is no abdominal tenderness.          Medical Decision Making       36 MINUTES SPENT BY ME on the date of service doing chart review, history, exam, documentation & further activities per the note.      Data     I have personally reviewed the following data over the past 24 hrs:    N/A  \   N/A   / N/A     131 (L) 95 (L) 27.1 (H) /  104 (H)   4.7 26 3.36 (H) \     INR:  2.82 (H) PTT:  N/A   D-dimer:  N/A Fibrinogen:  N/A       Imaging results reviewed over the past 24 hrs:   No results found for this or any previous visit (from the past 24 hours).

## 2025-02-20 NOTE — PROGRESS NOTES
Potassium   Date Value Ref Range Status   02/20/2025 5.7 (H) 3.4 - 5.3 mmol/L Final     Hemoglobin   Date Value Ref Range Status   02/17/2025 8.8 (L) 13.3 - 17.7 g/dL Final     Creatinine   Date Value Ref Range Status   02/20/2025 4.33 (H) 0.67 - 1.17 mg/dL Final     Urea Nitrogen   Date Value Ref Range Status   02/20/2025 35.3 (H) 8.0 - 23.0 mg/dL Final     Sodium   Date Value Ref Range Status   02/20/2025 132 (L) 135 - 145 mmol/L Final     INR   Date Value Ref Range Status   02/20/2025 1.50 (H) 0.85 - 1.15 Final     INR (External)   Date Value Ref Range Status   01/14/2025 4.7 (A) 0.9 - 1.1 Final       DIALYSIS PROCEDURE NOTE  Hepatitis status of previous patient on machine log was checked and verified ok to use with this patients hepatitis status.  Patient dialyzed for 3.5 hrs. on a K2 bath with a net fluid removal of  1.5 L.  A BFR of 350-400 ml/min was obtained via a R tunneled CVC.     The treatment plan was discussed with Dr. Ray during the treatment.    Total heparin received during the treatment: 0 units.   Line flushed, clamped and capped with heparin 1:1000 1.6 mL (1600 units) per lumen    Meds  given: none   Complications: Arterial spasm, reversed lines without resolution. Decreased BFR, improved. MD aware.      Person educated: patient. Knowledge base moderate. Barriers to learning: lethargy. Educated on procedure via verbal mode. Patient verbalized understanding.   ICEBOAT? Timeout performed pre-treatment  I: Patient was identified using 2 identifiers  C:  Consent Signed Yes  E: Equipment preventative maintenance is current and dialysis delivery system OK to use  B:    Latest Reference Range & Units 01/22/25 13:00   Hep B Surface Agn Nonreactive  Nonreactive         Hepatitis B Surface Antibody: Unknown; New Labs drawn 2/20/25.  O: Dialysis orders present and complete prior to treatment  A: Vascular access verified and assessed prior to treatment  T: Treatment was performed at a clinically  appropriate time  ?: Patient was allowed to ask questions and address concerns prior to treatment  See Adult Hemodialysis flowsheet in EPIC for further details and post assessment.  Machine water alarm in place and functioning. Transducer pods intact and checked every 15min.   Pt assisted with repositioning throughout dialysis treatment.  Pt returned via bed.  Chlorine/Chloramine water system checked every 4 hours.  Outpatient Dialysis at D    Post treatment report given to MARYSOL Carver regarding 1.5 L of fluid removed, last /72.    Dulce Maria Rosario RN

## 2025-02-20 NOTE — PLAN OF CARE
Date & Time: 2/120/2025 7700-9102    Summary:    1/21 Admitted for right leg pain and found to have right leg ulcer   1/23 Diagnostic angiogram   1/24 Thoracentesis   1/27 R common femoral and popliteal tibial endarterectomy, R femoral to popliteal/ tibial PTFE bypass- ICU for mechanical Ventilation  2/2 Thoracentesis   2/11 from R transmetatarsal amputation.   2/12 Tunneled dialysis catheter placement  2/20 Peritoneal catheter dialysis removal    Behavior & Aggression: Green  Fall Risk: Yes  Orientation:A&O X4  ABNL VS/O2:VSS RA  ABNL Labs: see results  Pain Management:Scheduled Tylenol  Bowel/Bladder: Pt on dialysis does not produce much urine, +BS, +gas, -BM this shift  Drains: PIV SL  Wounds/incisions: R foot amputation covered with rook boot and gauze dressing. Blanchable redness to coccyx, R groin site. Old peritoneal dialysis site. Doppler pulses intact.  Diet: Reg  Activity Level: A2 with Lift, Q2 turn and repo as tolerated  Tests/Procedures: Dialysis today  Anticipated  DC Date: Pending TCU placement  Significant Information:

## 2025-02-20 NOTE — PROGRESS NOTES
Renal Medicine Inpatient Dialysis Note                                Arnulfo Pritchett MRN# [id number]   Age: 64 year old YOB: 1960   Date of Admission: 1/21/2025 Hospital LOS: 30          Assessment/Plan:     1) End stage renal disease on PD  Chronic PD for last 3.5 years.  Home unit [facility], nephrologist Dr. May  Rx: 5 cycles, 2 L fill volumes, 9 hours, last fill 1.2 L with Extraneal.       Transitioned to iHD 02.15.25     2) Moderate R pleural effusion: Status post thoracentesis on 1/24 and 2/2.  Transudative  Chest x-ray on 2/5 shows small layering effusion on right side     3) PAD, right heel gangrene, occluded right SFA.  Status post rt fem - below knee popliteal bypass this admission     4) Postsurgical anemia in patient with ESRD-  -status post blood transfusion.  Hemoglobin 7-8.   -EPO 20,000 units 2/9/25.   Now that on hemo will dose in dialysis starting next run as long as he is in hosp.          TTS schedule        Interval History:     Comfortable  Seen while on dialysis    PD removed without issues     Dialysis run parameters reviewed with dialysis RN at patient bedside.    3.5 hours  2K  UF = 1.5 liter  ANSHUL  No heparin  TDC    Stable initial portion of run     ROS     GENERAL: NAD, No fever,chills  R: NEGATIVE for significant cough or SOB  CV: NEGATIVE for chest pain, palpitations    Dialysis Parameters:     Vitals were reviewed  Patient Vitals for the past 8 hrs:   BP Temp Temp src Pulse Resp SpO2 Weight   02/20/25 0900 107/75 -- -- 86 14 -- --   02/20/25 0845 106/74 -- -- 90 (!) 35 -- --   02/20/25 0830 99/81 -- -- 87 16 93 % --   02/20/25 0828 103/83 -- -- 90 22 92 % --   02/20/25 0800 121/87 -- -- 90 16 93 % --   02/20/25 0600 -- -- -- -- -- -- 76.4 kg (168 lb 6.9 oz)   02/20/25 0244 116/83 97.4  F (36.3  C) Oral 90 16 95 % --     Wt Readings from Last 4 Encounters:   02/20/25 76.4 kg (168 lb 6.9 oz)   01/21/25 75.8 kg (167 lb)   01/03/25 78.9 kg (174 lb)   01/02/25  78.9 kg (173 lb 14.4 oz)     I/O last 3 completed shifts:  In: 340 [P.O.:240; I.V.:100]  Out: 2 [Blood:2]    Vitals:    02/15/25 1900 02/16/25 1530 02/17/25 0636 02/19/25 0706   Weight: 80 kg (176 lb 5.9 oz) 80.9 kg (178 lb 5.6 oz) 77 kg (169 lb 12.1 oz) 83 kg (182 lb 15.7 oz)    02/20/25 0600   Weight: 76.4 kg (168 lb 6.9 oz)       Current Weight: 76.4 range   Dry Weight: 76 ?  Dialysis Temp: 36.5  C  Access Device: TDC  Access Site: right  Dialyzer: Revaclear  Dialysis Bath: 52  Sodium Profile: n  UF Goal: 2  Blood Flow Rate (mL/min): 400  Total Treatment Time (hrs): 3.5  Heparin: Low dose as required      EPO dose: y  Zemplar: n  IV Fe: n      Medications and Allergies:     Reviewed      Physical Exam:     Seen and examined during course of dialysis run    /75   Pulse 86   Temp 97.4  F (36.3  C) (Oral)   Resp 14   Wt 76.4 kg (168 lb 6.9 oz)   SpO2 93%   BMI 22.22 kg/m      GENERAL: awake, alert, follows  HEENT: NC/AT, PERRLA, EOMI, non icteric, pharynx moist without lesion  RESP: clear  CV: RRR, normal S1 S2  SKIN: catheter site clean without drainage      Data:       Recent Labs   Lab 02/20/25  0653   *   POTASSIUM 5.7*   CHLORIDE 95*   CO2 24   ANIONGAP 13   *   BUN 35.3*   CR 4.33*   GFRESTIMATED 14*   TERENCE 9.2     Recent Labs   Lab 02/20/25  0653 02/19/25  0733   POTASSIUM 5.7* 4.7     No lab results found in last 7 days.    Recent Labs   Lab 02/20/25  0653 02/19/25  0733 02/18/25  0750 02/17/25  0707 02/16/25  0308 02/15/25  1829 02/15/25  0900   PHOS 4.8* 4.0 5.6* 5.4* 4.1  --   --    HGB  --   --   --  8.8* 7.9* 8.3* 8.0*         G Boo Ray MD    [facility] - Nephrology  [phone]

## 2025-02-20 NOTE — PLAN OF CARE
"Goal Outcome Evaluation:    Date & Time: 2/19/25 7354-9762  Behavior & Aggression: green  Fall Risk: yes  Orientation:A&OX4  ABNL VS/O2:VSS, RA  Pain Management:declining any pain meds, \"It only hurts when you touch it.\"  Bowel/Bladder: Dialysis patient, voids little.  Positive BS, passing flatus, no BM.  Wounds/incisions: right groin, leg, foot incisions intact, with some ecchymosis and erythema, right foot incision, blackened with sutures, black heel. Right foot dressing changed. Rooke boot on.  Abd dressing CDI.   Diet:Tolerating regular diet without nausea.   Activity Level: Bedrest, q 2 hour turns, feet elevated, pillows in use.   Tests/Procedures: OR today to remove peritoneal dialysis cath.  Anticipated  DC Date: pending                              "

## 2025-02-20 NOTE — PROGRESS NOTES
Vascular Surgery Progress Note  02/20/2025       Subjective:  - Patient resting comfortably in bed this morning. PD catheter removed yesterday. No acute events overnight.     Objective:  Temp:  [96.4  F (35.8  C)-98  F (36.7  C)] 97.4  F (36.3  C)  Pulse:  [] 90  Resp:  [12-19] 16  BP: ()/(55-84) 116/83  Cuff Mean (mmHg):  [68-91] 74  SpO2:  [92 %-100 %] 95 %    Physical Exam:  Gen: Awake, NAD  Resp: Breathing room air, nonlabored   Abd:soft, nondistended, nontender, abdominal incision covered with dressing c/d/i  Ext: R TMA dressing c/d/I, stable right groin hematoma      Labs:  Recent Labs   Lab 02/17/25  0707 02/16/25  0308 02/15/25  1829   WBC 13.6* 11.4* 10.5   HGB 8.8* 7.9* 8.3*    [phone]        Assessment/Plan:   64-year-old male with multiple comorbidities, s/p right femoral to below-knee popliteal bypass with composite greater saphenous and ringed PTFE bypass graft, now s/p right transmetatarsal amputation with remaining necrotic right heel ulcer.  Recently switched from PD to HD after tunnel line placement. S/p PD catheter removal 2/19/2025   Awaiting TCU bed     - Continue current cares including Plavix and high-dose statin therapy   - conservative cares to right groin hematoma   - ok to resume warfarin from vascular standpoint - We will defer to the hospital service as to the need for bridging with heparin drip, as he is anticoagulated not only for his bypass graft but also paroxysmal A-fib in the setting of ischemic cardiomyopathy and severely reduced ejection fraction   - vascular surgery team will follow peripherally      Discussed with vascular surgery staff, who is in agreement with the above.  - - - - - - - - - - - - - - - - - -  Evelia Appiah PA-C  Vascular Surgery     STAFF: As above.  Quite comfortable.  PD catheter site looks fine.  Very stable right groin hematoma with no   obvious infection.  Graft remains patent.   Discharge to TCU for hospitalists.    Patrick Call  MD Angel Luis

## 2025-02-20 NOTE — PROGRESS NOTES
Care Management Follow Up    Length of Stay (days): 30    Expected Discharge Date: 02/21/2025     Concerns to be Addressed: discharge planning     Patient plan of care discussed at interdisciplinary rounds: Yes    Anticipated Discharge Disposition: Skilled Nursing Facility     Anticipated Discharge Services:    Anticipated Discharge DME:      Patient/family educated on Medicare website which has current facility and service quality ratings:    Education Provided on the Discharge Plan:    Patient/Family in Agreement with the Plan: yes    Referrals Placed by CM/SW:    Private pay costs discussed: Not applicable    Discussed  Partnership in Safe Discharge Planning  document with patient/family: Yes:     Handoff Completed: Yes, non-[facility] PCP: External handoff communication completed    Additional Information:  Writer discussed discharge planning with patient. Options are to discharge to a TCU that has on site dialysis     [facility] [place]     Or to discharge to a facility close to where his dialysis chair is currently in [place] in March, eventually moving to [place] in April     Patient shared with me that he really does not want to go [facility] and would prefer to pay out of pocket for transport (closer to his home) Currently those TCU's do not have bed availability but writer will continue to discharge plan - patient will not qualify for MA as he is well above income in assets     Next Steps: Accepting facility, PAS, ride and dialysis plan     KATELYN Duarte

## 2025-02-21 ENCOUNTER — APPOINTMENT (OUTPATIENT)
Dept: OCCUPATIONAL THERAPY | Facility: CLINIC | Age: 65
DRG: 853 | End: 2025-02-21
Attending: INTERNAL MEDICINE
Payer: MEDICARE

## 2025-02-21 ENCOUNTER — APPOINTMENT (OUTPATIENT)
Dept: SPEECH THERAPY | Facility: CLINIC | Age: 65
DRG: 853 | End: 2025-02-21
Attending: INTERNAL MEDICINE
Payer: MEDICARE

## 2025-02-21 ENCOUNTER — APPOINTMENT (OUTPATIENT)
Dept: PHYSICAL THERAPY | Facility: CLINIC | Age: 65
End: 2025-02-21
Attending: INTERNAL MEDICINE
Payer: MEDICARE

## 2025-02-21 LAB
ANION GAP SERPL CALCULATED.3IONS-SCNC: 11 MMOL/L (ref 7–15)
BUN SERPL-MCNC: 25.2 MG/DL (ref 8–23)
CALCIUM SERPL-MCNC: 8.8 MG/DL (ref 8.8–10.4)
CHLORIDE SERPL-SCNC: 97 MMOL/L (ref 98–107)
CREAT SERPL-MCNC: 3.35 MG/DL (ref 0.67–1.17)
EGFRCR SERPLBLD CKD-EPI 2021: 20 ML/MIN/1.73M2
ERYTHROCYTE [DISTWIDTH] IN BLOOD BY AUTOMATED COUNT: 21.1 % (ref 10–15)
GLUCOSE BLDC GLUCOMTR-MCNC: 101 MG/DL (ref 70–99)
GLUCOSE BLDC GLUCOMTR-MCNC: 138 MG/DL (ref 70–99)
GLUCOSE BLDC GLUCOMTR-MCNC: 169 MG/DL (ref 70–99)
GLUCOSE BLDC GLUCOMTR-MCNC: 183 MG/DL (ref 70–99)
GLUCOSE SERPL-MCNC: 122 MG/DL (ref 70–99)
HCO3 SERPL-SCNC: 26 MMOL/L (ref 22–29)
HCT VFR BLD AUTO: 31.9 % (ref 40–53)
HGB BLD-MCNC: 9.6 G/DL (ref 13.3–17.7)
INR PPP: 1.45 (ref 0.85–1.15)
MAGNESIUM SERPL-MCNC: 2.1 MG/DL (ref 1.7–2.3)
MCH RBC QN AUTO: 29.1 PG (ref 26.5–33)
MCHC RBC AUTO-ENTMCNC: 30.1 G/DL (ref 31.5–36.5)
MCV RBC AUTO: 97 FL (ref 78–100)
PHOSPHATE SERPL-MCNC: 3.8 MG/DL (ref 2.5–4.5)
PLATELET # BLD AUTO: 449 10E3/UL (ref 150–450)
POTASSIUM SERPL-SCNC: 4.6 MMOL/L (ref 3.4–5.3)
RBC # BLD AUTO: 3.3 10E6/UL (ref 4.4–5.9)
SODIUM SERPL-SCNC: 134 MMOL/L (ref 135–145)
UFH PPP CHRO-ACNC: 0.18 IU/ML
UFH PPP CHRO-ACNC: <0.1 IU/ML
UFH PPP CHRO-ACNC: <0.1 IU/ML
WBC # BLD AUTO: 10.4 10E3/UL (ref 4–11)

## 2025-02-21 PROCEDURE — 250N000013 HC RX MED GY IP 250 OP 250 PS 637

## 2025-02-21 PROCEDURE — 85014 HEMATOCRIT: CPT | Performed by: STUDENT IN AN ORGANIZED HEALTH CARE EDUCATION/TRAINING PROGRAM

## 2025-02-21 PROCEDURE — 83735 ASSAY OF MAGNESIUM: CPT

## 2025-02-21 PROCEDURE — 36415 COLL VENOUS BLD VENIPUNCTURE: CPT | Performed by: STUDENT IN AN ORGANIZED HEALTH CARE EDUCATION/TRAINING PROGRAM

## 2025-02-21 PROCEDURE — 85610 PROTHROMBIN TIME: CPT

## 2025-02-21 PROCEDURE — 80048 BASIC METABOLIC PNL TOTAL CA: CPT

## 2025-02-21 PROCEDURE — 99232 SBSQ HOSP IP/OBS MODERATE 35: CPT | Performed by: STUDENT IN AN ORGANIZED HEALTH CARE EDUCATION/TRAINING PROGRAM

## 2025-02-21 PROCEDURE — 85520 HEPARIN ASSAY: CPT | Performed by: STUDENT IN AN ORGANIZED HEALTH CARE EDUCATION/TRAINING PROGRAM

## 2025-02-21 PROCEDURE — 250N000011 HC RX IP 250 OP 636: Performed by: STUDENT IN AN ORGANIZED HEALTH CARE EDUCATION/TRAINING PROGRAM

## 2025-02-21 PROCEDURE — 84100 ASSAY OF PHOSPHORUS: CPT

## 2025-02-21 PROCEDURE — 80051 ELECTROLYTE PANEL: CPT

## 2025-02-21 PROCEDURE — 250N000013 HC RX MED GY IP 250 OP 250 PS 637: Performed by: STUDENT IN AN ORGANIZED HEALTH CARE EDUCATION/TRAINING PROGRAM

## 2025-02-21 PROCEDURE — 120N000001 HC R&B MED SURG/OB

## 2025-02-21 PROCEDURE — 92526 ORAL FUNCTION THERAPY: CPT | Mod: GN | Performed by: SPEECH-LANGUAGE PATHOLOGIST

## 2025-02-21 PROCEDURE — 97530 THERAPEUTIC ACTIVITIES: CPT | Mod: GP

## 2025-02-21 PROCEDURE — 36415 COLL VENOUS BLD VENIPUNCTURE: CPT

## 2025-02-21 PROCEDURE — 97530 THERAPEUTIC ACTIVITIES: CPT | Mod: GO

## 2025-02-21 RX ADMIN — ACETAMINOPHEN 975 MG: 325 TABLET, FILM COATED ORAL at 13:04

## 2025-02-21 RX ADMIN — SEVELAMER CARBONATE 800 MG: 800 TABLET, FILM COATED ORAL at 18:28

## 2025-02-21 RX ADMIN — METOPROLOL SUCCINATE 12.5 MG: 25 TABLET, EXTENDED RELEASE ORAL at 08:39

## 2025-02-21 RX ADMIN — SEVELAMER CARBONATE 800 MG: 800 TABLET, FILM COATED ORAL at 13:04

## 2025-02-21 RX ADMIN — SENNOSIDES AND DOCUSATE SODIUM 1 TABLET: 50; 8.6 TABLET ORAL at 22:00

## 2025-02-21 RX ADMIN — HEPARIN SODIUM 1500 UNITS/HR: 10000 INJECTION, SOLUTION INTRAVENOUS at 15:30

## 2025-02-21 RX ADMIN — ATORVASTATIN CALCIUM 40 MG: 40 TABLET, FILM COATED ORAL at 22:00

## 2025-02-21 RX ADMIN — CLOPIDOGREL BISULFATE 75 MG: 75 TABLET ORAL at 22:00

## 2025-02-21 RX ADMIN — POLYETHYLENE GLYCOL 3350 17 G: 17 POWDER, FOR SOLUTION ORAL at 08:40

## 2025-02-21 RX ADMIN — Medication 1 CAPSULE: at 08:39

## 2025-02-21 RX ADMIN — SEVELAMER CARBONATE 800 MG: 800 TABLET, FILM COATED ORAL at 10:30

## 2025-02-21 RX ADMIN — CINACALCET 60 MG: 30 TABLET ORAL at 22:00

## 2025-02-21 RX ADMIN — WARFARIN SODIUM 7.5 MG: 5 TABLET ORAL at 18:29

## 2025-02-21 RX ADMIN — PANTOPRAZOLE SODIUM 40 MG: 40 TABLET, DELAYED RELEASE ORAL at 08:39

## 2025-02-21 RX ADMIN — CALCITRIOL CAPSULES 0.25 MCG 0.25 MCG: 0.25 CAPSULE ORAL at 22:00

## 2025-02-21 RX ADMIN — METOPROLOL SUCCINATE 12.5 MG: 25 TABLET, EXTENDED RELEASE ORAL at 22:00

## 2025-02-21 RX ADMIN — MIDODRINE HYDROCHLORIDE 10 MG: 2.5 TABLET ORAL at 08:39

## 2025-02-21 RX ADMIN — ACETAMINOPHEN 975 MG: 325 TABLET, FILM COATED ORAL at 22:00

## 2025-02-21 RX ADMIN — MIDODRINE HYDROCHLORIDE 10 MG: 2.5 TABLET ORAL at 18:29

## 2025-02-21 RX ADMIN — Medication 5 MG: at 22:00

## 2025-02-21 RX ADMIN — SENNOSIDES AND DOCUSATE SODIUM 1 TABLET: 50; 8.6 TABLET ORAL at 08:40

## 2025-02-21 ASSESSMENT — ACTIVITIES OF DAILY LIVING (ADL)
ADLS_ACUITY_SCORE: 67

## 2025-02-21 NOTE — PLAN OF CARE
Goal Outcome Evaluation:Hx PAD of LLE s/p endarterectomy and fem-tibioperoneal trunk bypass on 10/25/24  Hx RLE arterial occlusion s/p SFA angioplasty and stent 5/2024  PAD RLE, right heel gangrene, occluded SFA, no evidence of osteomyelitis  s/p right common femoral and popliteal/tibial endarterectomy, right femoral to popliteal/tibial PTFE bypass 1/27/25   # Right groin hematoma resolving       Date & Time: 02/21/[id number]  Surgery/POD#: R foot amputation JUAN CARLOS due to covered   Behavior & Aggression: calm/quiet   Fall Risk:  fall precaution due to general weakness   Orientation: A/O x4 calm/cooperative, denies general discomforts, short of breath, chest tightness, abdominal pain, nausea, vomiting, and fever.  ABNL VS/O2: vss on R A   ABNL Labs: heparin  level 0.10, glucose 183,   Pain Management: pain management schedule tylenol   Bowel/Bladder: continent both B/B urinal use   Drains: Diallyls port  intact, Left PIV heparin infusing @1500 units  Wounds/incisions: R foot amputation, dry clean and dry   Diet: low fat diet with good appetite   Activity Level:  x2 [place] steady   Tests/Procedures: None   Anticipated  DC Date: waiting TCU replacement   Significant Information:

## 2025-02-21 NOTE — PROGRESS NOTES
Pt vitally stable on RA overnight, needs met this shift, A/OX4, on low diet K+. R foot amputation covered w/ gauze CDI,  Q2 turn and repo,as tolerated, BG AC HS, Heparin infusing @ 1200Units/hr  pain controlled with scheduled tylenol,  Awaiting TCU placement.

## 2025-02-21 NOTE — PROGRESS NOTES
Care Management Follow Up    Length of Stay (days): 31    Expected Discharge Date: 02/24/2025     Concerns to be Addressed: discharge planning     Patient plan of care discussed at interdisciplinary rounds: Yes    Anticipated Discharge Disposition: Skilled Nursing Facility      Anticipated Discharge Services:    Anticipated Discharge DME:      Patient/family educated on Medicare website which has current facility and service quality ratings:    Education Provided on the Discharge Plan:    Patient/Family in Agreement with the Plan: yes    Referrals Placed by CM/SW:    Private pay costs discussed: Not applicable    Discussed  Partnership in Safe Discharge Planning  document with patient/family: No     Handoff Completed: No, handoff not indicated or clinically appropriate    Additional Information:  Writer sent additional referrals to all [place] facilities, resent to [facility], [facility], [facility] and [facility] again for male beds     Otherwise might need a dialysis closer around here until patient is stronger to tolerate rides     Next Steps:   Needs TCU with dialysis other then [facility] and/or a TCU close to where he will be getting dialysis     KATELYN Duarte

## 2025-02-21 NOTE — PROGRESS NOTES
Renal Medicine Chart Check      Following for ESRD  Has converting from [facility] to HD    PD catheter removed 02.19.25    Dialyzed 02.20.25 without issue  UF = 1500 ml    HD in am         Recent Labs   Lab 02/21/25  0718 02/20/25  0653   POTASSIUM 4.6 5.7*           GENEVA Ray    [facility]  [phone]

## 2025-02-21 NOTE — PROGRESS NOTES
Medicine Progress Note - Hospitalist Service    Date of Admission:  1/21/2025    Assessment & Plan   Arnulfo Pritchett is a 64 year old male with past medical history of severe heart failure (EF 30%), ESRD (on peritoneal dialysis), chronic paroxysmal AF on warfarin, LLE PAD s/p endarterectomy, RCC s/p nephrectomy admitted on 1/21/2025 for septic shock secondary to right leg PAD with worsening right heel necrotic ulcer. The patient was seen in ED at outside hospital on 1/3/25 and diagnosed with pneumonia, later completing course of Cefdinir. He then presented on 1/21/25 to outside hospital for right leg pain and found to have right leg ulcer and transferred to [facility] for vascular surgery evaluation. Now s/p right common femoral BK popliteal/tibial endarterectomy and right common femoral to below-knee popliteal/tibial PTFE bypass performed on 1/27/25. The patient was admitted to the ICU for requirement of mechanical ventilation and pressor support following surgery for multifactorial shock.       Hospitalist service consulted 1/31/2025 for transfer out of ICU and to assist with medical management along with vascular surgery, podiatry and Infectious disease.     Hx PAD of LLE s/p endarterectomy and fem-tibioperoneal trunk bypass on 10/25/24  Hx RLE arterial occlusion s/p SFA angioplasty and stent 5/2024  PAD RLE, right heel gangrene, occluded SFA, no evidence of osteomyelitis  s/p right common femoral and popliteal/tibial endarterectomy, right femoral to popliteal/tibial PTFE bypass 1/27/25   # Right groin hematoma resolving   -Found to have critical limb ischemia on admission on January 21, for details see admission note.   -Distributive shock following surgery followed by the ICU service until 1/31.  -Followed by vascular surgery, podiatry, wound care, and ID.  -Coumadin has been held since admission, was was started on IV heparin drip since admission.  -Restarted warfarin on  2/14/2025 after discussion with vascular surgery.  INR now in goal range, heparin gtt discontinued.  -Has been maintained on statin and Plavix 75 mg daily during this hospital stay.  -Recent IV piperacillin/tazobactam (Zosyn), discontinued 2/2 per ID after 12 days of Zosyn; follow-up cultures, monitor off antibiotics.  -Wound care per surgery and WOC.  -pain control, see hospital orders.  -worsening ischemic changes in 2nd-5th toes of right foot.  -Underwent transmetatarsal amputation on 2/11/2025.  Tolerated procedure well.  Intraoperatively no signs of infection.  No further surgery per podiatry.  -Leukocytosis improved postsurgery.  Hemoglobin trended down to 7 on 2/13/2025 requiring transfusion, Hgb stable since then.  -INR has been subtherapeutic at 1.5 and will bridge with low intensity heparin drip     Multifactorial shock, resolved  ICU admission, weaned off vasopressors 1/30.  -Transitioned to midodrine with increase in prior to admission dose on1/31.  -Monitor vitals     End-stage renal disease (ESRD), on peritoneal dialysis  Hx of secondary hyperparathyroidism and hyperphosphatemia, phosphorous elevated above baseline of 6 at most recent of 7.9  Nephrology following.  -Chronic peritoneal dialysis for the last 3.5 years. Patient expressed interest to switch to HD. Nephrology discussed with his primary nephrologist who agrees to transition to hemodialysis.  Underwent right tunneled IJ catheter placement by IR on 2/12/2025.  Now undergoing HD on MWF schedule.  - On 2/16, patient having some episodes of shortness of breath that last 30-60 seconds. ECG without any changes concerning for ACS. CXR shows moderate partially loculated R pleural effusion which is the same to slightly worse from his last CXR. Currently satting well on room air. Will restart albuterol inhaler that patient was using outpatient after his recent pneumonia and monitor for improvement after dialysis tomorrow, possible more volume needs to  be taken off during HD. If no improvement with SOB after dialysis run tomorrow, would place IR consult for repeat thoracentesis.  -Continue midodrine.  -Peritoneal dialysis catheter removed 12/19 by vascular surgery  -INR at 1.5 on 2/20/25  -Heparin bridging started      acute hypoxemic respiratory failure, resolved  Right pleural effusion  Status post thoracentesis 1/24/25, repeat 2/2/25  -Following surgery on admission by vascular surgery was intubated.  Recent pneumonia treated with 3 weeks of Ceftin prior to this admission. Extubated 1/29/25  -Right pleural effusion.  Status post thoracentesis 1/24/25 with 1.5 L clear yellow fluid removed, likely transudative based on low cell count of 117, , and protein of 1.7. Possibly secondary to reduced peritoneal dialysis during hospitalization vs underlying severe HFrEF (30%).  -CT chest 2/1/25, see report, no clear pneumonia; large right pleural effusion noted.  -Now off oxygen. Encourage incentive spirometry.  -Antibiotics as above, discontinued by ID 2/2  -ID consulted 2/1 as above, recommend monitoring patient off antibiotics  -S/p repeat Right thoracentesis on 2/2 with 2.3 L of rolan fluid removed; additional studies, cultures (negative to date), cytology (negative for malignancy)      Paroxysmal atrial fibrillation  Chronic anticoagulation prior to admission, warfarin - ON HOLD  Ischemic cardiomyopathy with HLD, CAD s/p multiple stents, and severe HFrEF (30%)   Troponin elevation, likely type 2 MI  Wide complex tachycardia 2/1/25  Assessment-postoperatively has been maintained on heparin drip.  Coumadin has been held during his hospital stay.  -Echo 1/31-EF 25 to 30%.  Severe global hypokinesis with abnormal septal motion consistent with conduction abnormality.  Severe inferior wall hypokinesis.  LVH.  RV mildly dilated and mildly reduced RV function.  Mild mitral regurgitation.  Mild to moderate mitral stenosis.  Moderate aortic stenosis.  Left ventricular  fairly pressures elevated.  -Episode of wide-complex tachycardia noted by nursing staff, 15 beats, up on 2/1/2025.  -Continue inpatient telemetry.  -EP consulted on 2/16 given tachycardia with rates to 120s. They evaluated patient and said not unexpected given patient's severe HFrEF.              - Recommended increasing Toprol XL to 12.5 mg BID (PTA was on 25 mg daily)    -Troponins have been flat at 515.  Likely type II MI secondary to end-stage renal disease troponins are down from admission troponins of 693         # Confusion  # Metabolic encephalopathy resolved  -Patient refused MRI scan       Anemia of chronic disease  Baseline around 10.  Has been stable in the 8 range. Did need 1 U PRBC on 2/8 and 2/13 for Hgb<7 without signs of active bleeding.  -Nephrology started patient on EPO on 2/9 and to continue weekly.  -Continue to monitor hemoglobin daily,       # Hyponatremia  Hyperkalemia  Continue hemodialysis      Hyperglycemia  Type 2 diabetes   hemoglobin A1c 5.7% October 24  PTA regimen: Lantus 35 units at bedtime  Insulin requirements have been decreasing and he started having hypoglycemia episodes after being started on HD on 2/13/2025  Discontinue Lantus and carb coverage  Continue sliding scale insulin. Discharge insulin dosing dependent on requirements from sliding scale.  Continue to monitor FSBS       Mixed anion gap acidosis, resolved  -Improved respiratory function, monitor.  Anion gap resolved 1/31.     Abdominal discomfort 1/30, resolved  Noted to have some right upper quadrant pain 1/30.  Now resolved.  LFTs normal.  Was on Zosyn for above surgical issues.   -Monitor abdominal exam.  -Antibiotics managed as noted above.     Moderate protein calorie nutrition  -Nutrition consulted.  -Speech and language therapy (SLP) consulted, diet per speech therapy.     Gout  -PTA allopurinol currently on hold, reassess daily.     History of renal cell carcinoma   # Patient had CTA done in in October 2024  which showed a 4 mm hypoenhancing nodule within the lower part of the left kidney which radiology said that might represent a small primary renal cell carcinoma.  Patient has been aware of this findings and did not follow-up with outpatient  CT abdomen pelvis done in July 2025 showed cyst in the left kidney  -Did not wanted workup with urology but today he said that he is open to seeing urology at [facility]  -Case management consulted      -Status post right nephrectomy, monitor.     History of obstructive sleep apnea.  By report, intolerant of CPAP   -Monitor, follow-up with outpatient provider.     Weakness and deconditioning  -PT, OT, speech and language therapy (SLP) consulted.  -Increase activity as tolerated.  -Currently TCU recommended. If respiratory status improved and patient feeling better after HD run tomorrow, would be medically ready for discharge to TCU if cleared by vascular surgery as well.     Disposition  Anticipated discharge timing see Disposition Plan below.  -Anticipated discharge to transitional care unit.  -Social work consulted for discharge planning.    # Family communication   Called Son Ervin and left a voice mail    # Goals of care were discussed with the patient on 2/20/25 and he does not want to be full code and does not want ribs to be broken or be on the ventilator and code status changed to DNR                        Diet: Snacks/Supplements Adult: Ensure Enlive; Between Meals  Advance Diet as Tolerated: Regular Diet Adult; Regular Diet Adult    DVT Prophylaxis: Warfarin  Rosario Catheter: Not present  Lines: PRESENT             Cardiac Monitoring: None  Code Status: No CPR- Do NOT Intubate      Clinically Significant Risk Factors        # Hyperkalemia: Highest K = 5.7 mmol/L in last 2 days, will monitor as appropriate  # Hyponatremia: Lowest Na = 132 mmol/L in last 2 days, will monitor as appropriate  # Hypochloremia: Lowest Cl = 95 mmol/L in last 2 days, will monitor as  appropriate      # Hypoalbuminemia: Lowest albumin = 1.6 g/dL at 2/12/2025  7:26 AM, will monitor as appropriate     # Hypertension: Noted on problem list  # Chronic heart failure with reduced ejection fraction: last echo with EF <40%          # DMII: A1C = 6.6 % (Ref range: <5.7 %) within past 6 months    # Severe Malnutrition: based on nutrition assessment    # Financial/Environmental Concerns:           Social Drivers of Health    Tobacco Use: Medium Risk (2/19/2025)    Patient History     Smoking Tobacco Use: Former     Smokeless Tobacco Use: Never   Alcohol Use: Unknown (11/28/2018)    Received from [facility] and [facility]    AUDIT-C     Frequency of Alcohol Consumption: Monthly or less     Frequency of Binge Drinking: Never   Transportation Needs: High Risk (1/21/2025)    Transportation Needs     Within the past 12 months, has lack of transportation kept you from medical appointments, getting your medicines, non-medical meetings or appointments, work, or from getting things that you need?: Yes   Physical Activity: Unknown (10/8/2024)    Exercise Vital Sign     Days of Exercise per Week: 0 days   Social Connections: Unknown (10/8/2024)    Social Connection and Isolation Panel [NHANES]     Frequency of Social Gatherings with Friends and Family: Patient declined          Disposition Plan     Medically Ready for Discharge: Anticipated Tomorrow             Elizabeth Slater MD  Hospitalist Service  [facility]  Securely message with [employer] (more info)  Text page via [employer] Paging/Directory   ______________________________________________________________________    Interval History     Patient seen and examined at bedside.  I discussed with patient that he is on heparin bridging because his INR is subtherapeutic at 1.45 no acute overnight events.  Denies any chest pain.  Physical Exam   Vital Signs: Temp: 98.1  F  (36.7  C) Temp src: Oral BP: 128/88 Pulse: 100   Resp: 18 SpO2: 97 % O2 Device: None (Room air)    Weight: 168 lbs 6.9 oz    Physical Exam  Cardiovascular:      Rate and Rhythm: Normal rate and regular rhythm.   Pulmonary:      Effort: Pulmonary effort is normal. No respiratory distress.      Comments: Anterior lung fields breath sounds within normal limit  Abdominal:      General: There is no distension.      Palpations: Abdomen is soft.      Tenderness: There is no abdominal tenderness.   Musculoskeletal:      Comments: Right lower extremity covered with dressing  No edema in the left lower extremity          Medical Decision Making       40 MINUTES SPENT BY ME on the date of service doing chart review, history, exam, documentation & further activities per the note.      Data     I have personally reviewed the following data over the past 24 hrs:    10.4  \   9.6 (L)   / 449     134 (L) 97 (L) 25.2 (H) /  183 (H)   4.6 26 3.35 (H) \     INR:  1.45 (H) PTT:  N/A   D-dimer:  N/A Fibrinogen:  N/A       Imaging results reviewed over the past 24 hrs:   No results found for this or any previous visit (from the past 24 hours).

## 2025-02-21 NOTE — PROGRESS NOTES
Speech Language Therapy Discharge Summary    Reason for therapy discharge:    All goals and outcomes met, no further needs identified.    Progress towards therapy goal(s). See goals on Care Plan in [facility] electronic health record for goal details.  Goals met    Therapy recommendation(s):    No further therapy is recommended.    Swallow goals met: tolerating soft/moist regular food choices and thin liquids - ensure upright positioning for intake, tray set up may still be needed.

## 2025-02-21 NOTE — PROGRESS NOTES
Care Management Follow Up    Length of Stay (days): 31    Expected Discharge Date: 02/24/2025     Concerns to be Addressed: discharge planning     Patient plan of care discussed at interdisciplinary rounds: Yes    Anticipated Discharge Disposition: Skilled Nursing Facility     Anticipated Discharge Services:  [facility]  Anticipated Discharge DME:      Patient/family educated on Medicare website which has current facility and service quality ratings:    Education Provided on the Discharge Plan:    Patient/Family in Agreement with the Plan: yes    Referrals Placed by CM/SW:    Private pay costs discussed: Not applicable    Discussed  Partnership in Safe Discharge Planning  document with patient/family: No     Handoff Completed: No, handoff not indicated or clinically appropriate    Additional Information:  Writer called [phone] and spoke with Di, manager of [facility]. Updated Di that delayed due to still needing to find appropriate TCU bed. Patient still can come to the unit Monday, Wednesday and Friday. Di would update with time, call when discharge known. It is just that time each day could change.     Next Steps: continue to work towards securing TCU bed. Care Management to follow up with Di once discharge day known to confirm time for first dialysis run.    Calli Hyman, RN   [facility]   Phone [phone], [facility] or [phone]

## 2025-02-21 NOTE — PLAN OF CARE
"Goal Outcome Evaluation:    Date & Time: 2/20/24 [id number]  Behavior & Aggression: green  Fall Risk: yes  Orientation:A&OX4  ABNL VS/O2:VSS  Pain Management:\"It only hurts when you touch it\" refusing pain meds, even tylenol, even after much education.   Bowel/Bladder: No BM today, refused miralax, was agreeable to colace. Hemodialysis patient.   Wounds/incisions:  Right groin/leg incisions with liquid bandaid, ecchymosis.  Right foot dressing CDI. Right foot heel blackened.  Blanchable sacrum. Scattered scabs/abrasians. Repo q 2 hours. Heels elevated.   Diet:Tolerating regular diet.   Activity Level: Bedrest, q 2 hour turn, heels elevated.   Tests/Procedures: Dialysis today.   Anticipated  DC Date: pending                            "

## 2025-02-21 NOTE — PROGRESS NOTES
[facility]    Medicine Progress Note - Hospitalist Service    Date of Admission:  1/21/2025    Assessment & Plan   Arnulfo Pritchett is a 64 year old male with past medical history of severe heart failure (EF 30%), ESRD (on peritoneal dialysis), chronic paroxysmal AF on warfarin, LLE PAD s/p endarterectomy, RCC s/p nephrectomy admitted on 1/21/2025 for septic shock secondary to right leg PAD with worsening right heel necrotic ulcer. The patient was seen in ED at outside hospital on 1/3/25 and diagnosed with pneumonia, later completing course of Cefdinir. He then presented on 1/21/25 to outside hospital for right leg pain and found to have right leg ulcer and transferred to [facility] for vascular surgery evaluation. Now s/p right common femoral BK popliteal/tibial endarterectomy and right common femoral to below-knee popliteal/tibial PTFE bypass performed on 1/27/25. The patient was admitted to the ICU for requirement of mechanical ventilation and pressor support following surgery for multifactorial shock.       Hospitalist service consulted 1/31/2025 for transfer out of ICU and to assist with medical management along with vascular surgery, podiatry and Infectious disease.     Hx PAD of LLE s/p endarterectomy and fem-tibioperoneal trunk bypass on 10/25/24  Hx RLE arterial occlusion s/p SFA angioplasty and stent 5/2024  PAD RLE, right heel gangrene, occluded SFA, no evidence of osteomyelitis  s/p right common femoral and popliteal/tibial endarterectomy, right femoral to popliteal/tibial PTFE bypass 1/27/25   # Right groin hematoma resolving   -Found to have critical limb ischemia on admission on January 21, for details see admission note.   -Distributive shock following surgery followed by the ICU service until 1/31.  -Followed by vascular surgery, podiatry, wound care, and ID.  -Coumadin has been held since admission, was was started on IV heparin drip since admission.  -Restarted warfarin on  2/14/2025 after discussion with vascular surgery.  INR now in goal range, heparin gtt discontinued.  -Has been maintained on statin and Plavix 75 mg daily during this hospital stay.  -Recent IV piperacillin/tazobactam (Zosyn), discontinued 2/2 per ID after 12 days of Zosyn; follow-up cultures, monitor off antibiotics.  -Wound care per surgery and WOC.  -pain control, see hospital orders.  -worsening ischemic changes in 2nd-5th toes of right foot.  -Underwent transmetatarsal amputation on 2/11/2025.  Tolerated procedure well.  Intraoperatively no signs of infection.  No further surgery per podiatry.  -Leukocytosis improved postsurgery.  Hemoglobin trended down to 7 on 2/13/2025 requiring transfusion, Hgb stable since then.  -INR has been subtherapeutic at 1.5 and will bridge with low intensity heparin drip     Multifactorial shock, resolved  ICU admission, weaned off vasopressors 1/30.  -Transitioned to midodrine with increase in prior to admission dose on1/31.  -Monitor vitals     End-stage renal disease (ESRD), on peritoneal dialysis  Hx of secondary hyperparathyroidism and hyperphosphatemia, phosphorous elevated above baseline of 6 at most recent of 7.9  Nephrology following.  -Chronic peritoneal dialysis for the last 3.5 years. Patient expressed interest to switch to HD. Nephrology discussed with his primary nephrologist who agrees to transition to hemodialysis.  Underwent right tunneled IJ catheter placement by IR on 2/12/2025.  Now undergoing HD on MWF schedule.  - On 2/16, patient having some episodes of shortness of breath that last 30-60 seconds. ECG without any changes concerning for ACS. CXR shows moderate partially loculated R pleural effusion which is the same to slightly worse from his last CXR. Currently satting well on room air. Will restart albuterol inhaler that patient was using outpatient after his recent pneumonia and monitor for improvement after dialysis tomorrow, possible more volume needs to  be taken off during HD. If no improvement with SOB after dialysis run tomorrow, would place IR consult for repeat thoracentesis.  -Continue midodrine.  -Peritoneal dialysis catheter removed 12/19 by vascular surgery  -INR at 1.5 on 2/29/15      acute hypoxemic respiratory failure, resolved  Right pleural effusion  Status post thoracentesis 1/24/25, repeat 2/2/25  -Following surgery on admission by vascular surgery was intubated.  Recent pneumonia treated with 3 weeks of Ceftin prior to this admission. Extubated 1/29/25  -Right pleural effusion.  Status post thoracentesis 1/24/25 with 1.5 L clear yellow fluid removed, likely transudative based on low cell count of 117, , and protein of 1.7. Possibly secondary to reduced peritoneal dialysis during hospitalization vs underlying severe HFrEF (30%).  -CT chest 2/1/25, see report, no clear pneumonia; large right pleural effusion noted.  -Now off oxygen. Encourage incentive spirometry.  -Antibiotics as above, discontinued by ID 2/2  -ID consulted 2/1 as above, recommend monitoring patient off antibiotics  -S/p repeat Right thoracentesis on 2/2 with 2.3 L of rolan fluid removed; additional studies, cultures (negative to date), cytology (negative for malignancy)      Paroxysmal atrial fibrillation  Chronic anticoagulation prior to admission, warfarin - ON HOLD  Ischemic cardiomyopathy with HLD, CAD s/p multiple stents, and severe HFrEF (30%)   Troponin elevation, likely type 2 MI  Wide complex tachycardia 2/1/25  Assessment-postoperatively has been maintained on heparin drip.  Coumadin has been held during his hospital stay.  -Echo 1/31-EF 25 to 30%.  Severe global hypokinesis with abnormal septal motion consistent with conduction abnormality.  Severe inferior wall hypokinesis.  LVH.  RV mildly dilated and mildly reduced RV function.  Mild mitral regurgitation.  Mild to moderate mitral stenosis.  Moderate aortic stenosis.  Left ventricular fairly pressures  elevated.  -Episode of wide-complex tachycardia noted by nursing staff, 15 beats, up on 2/1/2025.  -Continue inpatient telemetry.  -EP consulted on 2/16 given tachycardia with rates to 120s. They evaluated patient and said not unexpected given patient's severe HFrEF.              - Recommended increasing Toprol XL to 12.5 mg BID (PTA was on 25 mg daily)    -Troponins have been flat at 515.  Likely type II MI secondary to end-stage renal disease troponins are down from admission troponins of 693         # Confusion  # Metabolic encephalopathy resolved  -Patient refused MRI scan       Anemia of chronic disease  Baseline around 10.  Has been stable in the 8 range. Did need 1 U PRBC on 2/8 and 2/13 for Hgb<7 without signs of active bleeding.  -Nephrology started patient on EPO on 2/9 and to continue weekly.  -Continue to monitor hemoglobin daily,       # Hyponatremia  Hyperkalemia  Continue hemodialysis      Hyperglycemia  Type 2 diabetes   hemoglobin A1c 5.7% October 24  PTA regimen: Lantus 35 units at bedtime  Insulin requirements have been decreasing and he started having hypoglycemia episodes after being started on HD on 2/13/2025  Discontinue Lantus and carb coverage  Continue sliding scale insulin. Discharge insulin dosing dependent on requirements from sliding scale.  Continue to monitor FSBS       Mixed anion gap acidosis, resolved  -Improved respiratory function, monitor.  Anion gap resolved 1/31.     Abdominal discomfort 1/30, resolved  Noted to have some right upper quadrant pain 1/30.  Now resolved.  LFTs normal.  Was on Zosyn for above surgical issues.   -Monitor abdominal exam.  -Antibiotics managed as noted above.     Moderate protein calorie nutrition  -Nutrition consulted.  -Speech and language therapy (SLP) consulted, diet per speech therapy.     Gout  -PTA allopurinol currently on hold, reassess daily.     History of renal cell carcinoma   # Patient had CTA done in in October 2024 which showed a 4 mm  "hypoenhancing nodule within the lower part of the left kidney which radiology said that might represent a small primary renal cell carcinoma.  Patient has been aware of this findings and did not follow-up with outpatient  CT abdomen pelvis done in July 2025 showed cyst in the left kidney  -Patient currently does not want follow-up with urology and is not sure if he wants this to be worked up          -Status post right nephrectomy, monitor.     History of obstructive sleep apnea.  By report, intolerant of CPAP   -Monitor, follow-up with outpatient provider.     Weakness and deconditioning  -PT, OT, speech and language therapy (SLP) consulted.  -Increase activity as tolerated.  -Currently TCU recommended. If respiratory status improved and patient feeling better after HD run tomorrow, would be medically ready for discharge to TCU if cleared by vascular surgery as well.     Disposition  Anticipated discharge timing see Disposition Plan below.  -Anticipated discharge to transitional care unit.  -Social work consulted for discharge planning.    # Family communication   -Updated Son Ervin       # Goals of care were discussed with the patient and he does not want to be full code and does not want ribs to be broken or be on the ventilator and code status changed to DNR              Goals of care discussion:  Patient had mentioned to nursing staff about \"wanting to die\", aand also mentioned to prior hospitalist he is tired of living like this and he did not think he was going to make it out of the hospital. Palliative consulted. When palliative saw patient the next day, patient appeared to be in a better mood and stated he wanted to continue restorative cares. He has however mentioned to Nephrology that he will like to switch from peritoneal dialysis to HD as it will make him feel less fatigued , if however HD does not help, then he would consider comfort measures.   Currently patient's goals are restorative and palliative " has signed off               Diet: Snacks/Supplements Adult: Ensure Enlive; Between Meals  Advance Diet as Tolerated: Regular Diet Adult; Regular Diet Adult    DVT Prophylaxis: Warfarin  Rosario Catheter: Not present  Lines: PRESENT      CVC Double Lumen Right Internal jugular Non - valved (open ended);Tunneled-Site Assessment: WDL      Cardiac Monitoring: None  Code Status: No CPR- Do NOT Intubate      Clinically Significant Risk Factors        # Hyperkalemia: Highest K = 5.7 mmol/L in last 2 days, will monitor as appropriate  # Hyponatremia: Lowest Na = 131 mmol/L in last 2 days, will monitor as appropriate  # Hypochloremia: Lowest Cl = 95 mmol/L in last 2 days, will monitor as appropriate      # Hypoalbuminemia: Lowest albumin = 1.6 g/dL at 2/12/2025  7:26 AM, will monitor as appropriate     # Hypertension: Noted on problem list  # Chronic heart failure with reduced ejection fraction: last echo with EF <40%          # DMII: A1C = 6.6 % (Ref range: <5.7 %) within past 6 months    # Severe Malnutrition: based on nutrition assessment    # Financial/Environmental Concerns:           Social Drivers of Health    Tobacco Use: Medium Risk (2/19/2025)    Patient History     Smoking Tobacco Use: Former     Smokeless Tobacco Use: Never   Alcohol Use: Unknown (11/28/2018)    Received from [facility] and [facility]    AUDIT-C     Frequency of Alcohol Consumption: Monthly or less     Frequency of Binge Drinking: Never   Transportation Needs: High Risk (1/21/2025)    Transportation Needs     Within the past 12 months, has lack of transportation kept you from medical appointments, getting your medicines, non-medical meetings or appointments, work, or from getting things that you need?: Yes   Physical Activity: Unknown (10/8/2024)    Exercise Vital Sign     Days of Exercise per Week: 0 days   Social Connections: Unknown (10/8/2024)    Social Connection and  Isolation Panel [NHANES]     Frequency of Social Gatherings with Friends and Family: Patient declined          Disposition Plan     Medically Ready for Discharge: Anticipated Tomorrow             Elizabeth Slater MD  Hospitalist Service  [facility]  Securely message with [employer] (more info)  Text page via [employer] Paging/Directory   ______________________________________________________________________    Interval History   No acute events  Patient not in pain or in respiratory distress      Physical Exam   Vital Signs: Temp: 97.7  F (36.5  C) Temp src: Oral BP: 100/72 Pulse: 102   Resp: 16 SpO2: 92 % O2 Device: None (Room air)    Weight: 168 lbs 6.9 oz    Physical Exam  Cardiovascular:      Rate and Rhythm: Normal rate and regular rhythm.   Pulmonary:      Effort: Pulmonary effort is normal. No respiratory distress.   Abdominal:      General: There is no distension.      Palpations: Abdomen is soft.      Tenderness: There is no abdominal tenderness.          Medical Decision Making       48 MINUTES SPENT BY ME on the date of service doing chart review, history, exam, documentation & further activities per the note.      Data     I have personally reviewed the following data over the past 24 hrs:    N/A  \   N/A   / N/A     132 (L) 95 (L) 35.3 (H) /  163 (H)   5.7 (H) 24 4.33 (H) \     INR:  1.50 (H) PTT:  N/A   D-dimer:  N/A Fibrinogen:  N/A       Imaging results reviewed over the past 24 hrs:   No results found for this or any previous visit (from the past 24 hours).

## 2025-02-22 LAB
ANION GAP SERPL CALCULATED.3IONS-SCNC: 14 MMOL/L (ref 7–15)
BUN SERPL-MCNC: 34.2 MG/DL (ref 8–23)
CALCIUM SERPL-MCNC: 9 MG/DL (ref 8.8–10.4)
CHLORIDE SERPL-SCNC: 94 MMOL/L (ref 98–107)
CREAT SERPL-MCNC: 4.16 MG/DL (ref 0.67–1.17)
EGFRCR SERPLBLD CKD-EPI 2021: 15 ML/MIN/1.73M2
ERYTHROCYTE [DISTWIDTH] IN BLOOD BY AUTOMATED COUNT: 20.5 % (ref 10–15)
GLUCOSE BLDC GLUCOMTR-MCNC: 115 MG/DL (ref 70–99)
GLUCOSE BLDC GLUCOMTR-MCNC: 120 MG/DL (ref 70–99)
GLUCOSE BLDC GLUCOMTR-MCNC: 127 MG/DL (ref 70–99)
GLUCOSE BLDC GLUCOMTR-MCNC: 85 MG/DL (ref 70–99)
GLUCOSE SERPL-MCNC: 123 MG/DL (ref 70–99)
HCO3 SERPL-SCNC: 24 MMOL/L (ref 22–29)
HCT VFR BLD AUTO: 29.6 % (ref 40–53)
HGB BLD-MCNC: 9.2 G/DL (ref 13.3–17.7)
INR PPP: 1.6 (ref 0.85–1.15)
MAGNESIUM SERPL-MCNC: 2 MG/DL (ref 1.7–2.3)
MCH RBC QN AUTO: 29.4 PG (ref 26.5–33)
MCHC RBC AUTO-ENTMCNC: 31.1 G/DL (ref 31.5–36.5)
MCV RBC AUTO: 95 FL (ref 78–100)
PHOSPHATE SERPL-MCNC: 3.9 MG/DL (ref 2.5–4.5)
PLATELET # BLD AUTO: 451 10E3/UL (ref 150–450)
POTASSIUM SERPL-SCNC: 5.1 MMOL/L (ref 3.4–5.3)
RBC # BLD AUTO: 3.13 10E6/UL (ref 4.4–5.9)
SODIUM SERPL-SCNC: 132 MMOL/L (ref 135–145)
UFH PPP CHRO-ACNC: 0.2 IU/ML
UFH PPP CHRO-ACNC: 0.41 IU/ML
UFH PPP CHRO-ACNC: 0.8 IU/ML
WBC # BLD AUTO: 11.4 10E3/UL (ref 4–11)

## 2025-02-22 PROCEDURE — 258N000003 HC RX IP 258 OP 636: Performed by: INTERNAL MEDICINE

## 2025-02-22 PROCEDURE — 250N000013 HC RX MED GY IP 250 OP 250 PS 637

## 2025-02-22 PROCEDURE — 84100 ASSAY OF PHOSPHORUS: CPT

## 2025-02-22 PROCEDURE — 250N000011 HC RX IP 250 OP 636: Performed by: INTERNAL MEDICINE

## 2025-02-22 PROCEDURE — 82565 ASSAY OF CREATININE: CPT

## 2025-02-22 PROCEDURE — 90937 HEMODIALYSIS REPEATED EVAL: CPT

## 2025-02-22 PROCEDURE — 85520 HEPARIN ASSAY: CPT | Performed by: SURGERY

## 2025-02-22 PROCEDURE — 120N000001 HC R&B MED SURG/OB

## 2025-02-22 PROCEDURE — 250N000013 HC RX MED GY IP 250 OP 250 PS 637: Performed by: STUDENT IN AN ORGANIZED HEALTH CARE EDUCATION/TRAINING PROGRAM

## 2025-02-22 PROCEDURE — 36415 COLL VENOUS BLD VENIPUNCTURE: CPT | Performed by: STUDENT IN AN ORGANIZED HEALTH CARE EDUCATION/TRAINING PROGRAM

## 2025-02-22 PROCEDURE — 83735 ASSAY OF MAGNESIUM: CPT

## 2025-02-22 PROCEDURE — 36415 COLL VENOUS BLD VENIPUNCTURE: CPT | Performed by: SURGERY

## 2025-02-22 PROCEDURE — 634N000001 HC RX 634: Mod: JZ | Performed by: INTERNAL MEDICINE

## 2025-02-22 PROCEDURE — 36592 COLLECT BLOOD FROM PICC: CPT | Performed by: SURGERY

## 2025-02-22 PROCEDURE — 99232 SBSQ HOSP IP/OBS MODERATE 35: CPT | Performed by: STUDENT IN AN ORGANIZED HEALTH CARE EDUCATION/TRAINING PROGRAM

## 2025-02-22 PROCEDURE — 90935 HEMODIALYSIS ONE EVALUATION: CPT | Performed by: INTERNAL MEDICINE

## 2025-02-22 PROCEDURE — 85610 PROTHROMBIN TIME: CPT

## 2025-02-22 PROCEDURE — 250N000011 HC RX IP 250 OP 636: Performed by: STUDENT IN AN ORGANIZED HEALTH CARE EDUCATION/TRAINING PROGRAM

## 2025-02-22 PROCEDURE — 82435 ASSAY OF BLOOD CHLORIDE: CPT

## 2025-02-22 PROCEDURE — 85014 HEMATOCRIT: CPT | Performed by: STUDENT IN AN ORGANIZED HEALTH CARE EDUCATION/TRAINING PROGRAM

## 2025-02-22 PROCEDURE — 80048 BASIC METABOLIC PNL TOTAL CA: CPT

## 2025-02-22 PROCEDURE — 85520 HEPARIN ASSAY: CPT | Performed by: STUDENT IN AN ORGANIZED HEALTH CARE EDUCATION/TRAINING PROGRAM

## 2025-02-22 PROCEDURE — 36415 COLL VENOUS BLD VENIPUNCTURE: CPT

## 2025-02-22 RX ORDER — WARFARIN SODIUM 5 MG/1
10 TABLET ORAL
Status: COMPLETED | OUTPATIENT
Start: 2025-02-22 | End: 2025-02-22

## 2025-02-22 RX ORDER — ALLOPURINOL 100 MG/1
100 TABLET ORAL
Status: DISCONTINUED | OUTPATIENT
Start: 2025-02-24 | End: 2025-03-01 | Stop reason: HOSPADM

## 2025-02-22 RX ORDER — MAGNESIUM OXIDE 400 MG/1
400 TABLET ORAL EVERY 4 HOURS
Status: COMPLETED | OUTPATIENT
Start: 2025-02-22 | End: 2025-02-22

## 2025-02-22 RX ADMIN — SENNOSIDES AND DOCUSATE SODIUM 1 TABLET: 50; 8.6 TABLET ORAL at 21:02

## 2025-02-22 RX ADMIN — HEPARIN SODIUM 1600 UNITS: 1000 INJECTION INTRAVENOUS; SUBCUTANEOUS at 10:32

## 2025-02-22 RX ADMIN — ACETAMINOPHEN 975 MG: 325 TABLET, FILM COATED ORAL at 05:50

## 2025-02-22 RX ADMIN — SODIUM CHLORIDE 250 ML: 0.9 INJECTION, SOLUTION INTRAVENOUS at 10:29

## 2025-02-22 RX ADMIN — Medication 400 MG: at 14:39

## 2025-02-22 RX ADMIN — CLOPIDOGREL BISULFATE 75 MG: 75 TABLET ORAL at 21:02

## 2025-02-22 RX ADMIN — CALCITRIOL CAPSULES 0.25 MCG 0.25 MCG: 0.25 CAPSULE ORAL at 21:01

## 2025-02-22 RX ADMIN — MIDODRINE HYDROCHLORIDE 10 MG: 2.5 TABLET ORAL at 08:27

## 2025-02-22 RX ADMIN — MIDODRINE HYDROCHLORIDE 10 MG: 2.5 TABLET ORAL at 17:27

## 2025-02-22 RX ADMIN — WARFARIN SODIUM 10 MG: 5 TABLET ORAL at 17:27

## 2025-02-22 RX ADMIN — ACETAMINOPHEN 975 MG: 325 TABLET, FILM COATED ORAL at 21:01

## 2025-02-22 RX ADMIN — SEVELAMER CARBONATE 800 MG: 800 TABLET, FILM COATED ORAL at 14:42

## 2025-02-22 RX ADMIN — ATORVASTATIN CALCIUM 40 MG: 40 TABLET, FILM COATED ORAL at 21:02

## 2025-02-22 RX ADMIN — SEVELAMER CARBONATE 800 MG: 800 TABLET, FILM COATED ORAL at 17:27

## 2025-02-22 RX ADMIN — HEPARIN SODIUM 1500 UNITS/HR: 10000 INJECTION, SOLUTION INTRAVENOUS at 23:41

## 2025-02-22 RX ADMIN — SODIUM CHLORIDE 200 ML: 0.9 INJECTION, SOLUTION INTRAVENOUS at 10:29

## 2025-02-22 RX ADMIN — Medication: at 10:32

## 2025-02-22 RX ADMIN — HEPARIN SODIUM 1600 UNITS: 1000 INJECTION INTRAVENOUS; SUBCUTANEOUS at 10:31

## 2025-02-22 RX ADMIN — EPOETIN ALFA-EPBX 4000 UNITS: 4000 INJECTION, SOLUTION INTRAVENOUS; SUBCUTANEOUS at 10:27

## 2025-02-22 RX ADMIN — HEPARIN SODIUM 1650 UNITS/HR: 10000 INJECTION, SOLUTION INTRAVENOUS at 06:23

## 2025-02-22 RX ADMIN — Medication 1 CAPSULE: at 13:26

## 2025-02-22 RX ADMIN — METOPROLOL SUCCINATE 12.5 MG: 25 TABLET, EXTENDED RELEASE ORAL at 21:02

## 2025-02-22 RX ADMIN — Medication 400 MG: at 17:28

## 2025-02-22 RX ADMIN — ACETAMINOPHEN 975 MG: 325 TABLET, FILM COATED ORAL at 13:27

## 2025-02-22 RX ADMIN — METOPROLOL SUCCINATE 12.5 MG: 25 TABLET, EXTENDED RELEASE ORAL at 13:27

## 2025-02-22 RX ADMIN — Medication 5 MG: at 21:01

## 2025-02-22 ASSESSMENT — ACTIVITIES OF DAILY LIVING (ADL)
ADLS_ACUITY_SCORE: 67
ADLS_ACUITY_SCORE: 68
ADLS_ACUITY_SCORE: 67
ADLS_ACUITY_SCORE: 68
ADLS_ACUITY_SCORE: 67
ADLS_ACUITY_SCORE: 68
ADLS_ACUITY_SCORE: 67

## 2025-02-22 NOTE — PLAN OF CARE
Goal Outcome Evaluation:    Date & Time: 2/22/25 ([id number])  Summary: Admitted 1/21 for R leg pain and found to have R leg ulcer.   1/23 Diagnostic angiogram.   1/24 Thoracentesis.   1/27: Had R common femoral and popliteal tibial endarterectomy, R femoral to popliteal/ tibial PTFE bypass and admitted to ICU for mechanical ventilation.   2/2 Thoracentesis.   2/11 R transmetatarsal amputation.   2/12 tunneled dialysis catheter placed.  2/20 removal of peritoneal dialysis catheter.  Orientation: A&O x4  ABNL VS/O2: VSS on RA  Pain Management: denied  Bowel/Bladder: incontinent of bowel at times   Drains: PIV hep gtt infusing at 1350 units/hr. Hep 10a recheck at 21:30.   Wounds/incisions: R groin site. Dressing to R foot CDI. Pt refused further skin assessment   Diet: regular   Activity Level: assist of 2 & lift  Anticipated  DC Date: TCU pending  Significant Information: Plan to have thoracentesis on Monday.

## 2025-02-22 NOTE — PROGRESS NOTES
Assessment and Plan:     End-stage renal disease: History of renal cell carcinoma status post nephrectomy.  Seen on dialysis today.    Plan 3 hours, right CVC with 400 blood flow rate, 1.5 L ultrafiltration.  K protocol, 32 bicarb 138 sodium.  EPO 4000 units on dialysis.  He is on peripheral heparin infusion.  No added heparin during dialysis.    He is on Renvela, Sensipar, calcitriol.  Labs today show sodium 132, potassium 5.1, bicarbonate 24, creatinine 4.15.  Hemoglobin is 9.2.    Will need a dialysis unit for outpt hemo.             Interval History:   Peripheral vascular disease.  Status post endarterectomy and PTFE bypass of the right lower extremity.  Complicated by shock and respiratory failure.      Diabetes:    Gout:      Atrial fibrillation: On Coumadin and metoprolol.           Review of Systems:   No complaints on dialysis.           Medications:     Current Facility-Administered Medications   Medication Dose Route Frequency Provider Last Rate Last Admin    - MEDICATION INSTRUCTIONS for Dialysis Patients -   Does not apply See Admin Instructions Evelia Appiah PA-C        acetaminophen (TYLENOL) tablet 975 mg  975 mg Oral Q8H Evelia Appiah PA-C   975 mg at 02/22/25 0550    [Held by provider] allopurinol (ZYLOPRIM) tablet 200 mg  200 mg Oral QPM Walter Dempsey MD   200 mg at 01/26/25 1957    atorvastatin (LIPITOR) tablet 40 mg  40 mg Oral or Feeding Tube At Bedtime Evelia Appiah PA-C   40 mg at 02/21/25 2200    calcitRIOL (ROCALTROL) capsule 0.25 mcg  0.25 mcg Oral At Bedtime Evelia Appiah PA-C   0.25 mcg at 02/21/25 2200    cinacalcet (SENSIPAR) tablet 60 mg  60 mg Oral Q Mon Wed Fri AM Evelia Appiah PA-C   60 mg at 02/21/25 2200    clopidogrel (PLAVIX) tablet 75 mg  75 mg Oral or Feeding Tube QPM Evelia Appiah PA-C   75 mg at 02/21/25 2200    epoetin evaristo-epbx (RETACRIT) injection 4,000 Units  4,000 Units Intravenous Once in dialysis/CRRT MARLON Ray MD         sodium chloride 0.9% DIALYSIS Cath LOCK - RED Lumen  10 mL Intracatheter Once in dialysis/CRRT MARLON Ray MD        Followed by    heparin 1000 unit/mL DIALYSIS Cath LOCK - RED Lumen  1.3-2.6 mL Intracatheter Once in dialysis/CRRT MARLON Ray MD        sodium chloride 0.9% DIALYSIS Cath LOCK - BLUE Lumen  10 mL Intracatheter Once in dialysis/CRRT MARLON Ray MD        Followed by    heparin 1000 unit/mL DIALYSIS Cath LOCK -BLUE Lumen  1.3-2.6 mL Intracatheter Once in dialysis/CRRT MARLON Ray MD        [Held by provider] insulin aspart (NovoLOG) injection (RAPID ACTING)   Subcutaneous TID w/meals Walter Dempsey MD   2 Units at 02/12/25 1700    insulin aspart (NovoLOG) injection (RAPID ACTING)  1-7 Units Subcutaneous TID AC Evelia Appiah PA-C   1 Units at 02/21/25 1829    insulin aspart (NovoLOG) injection (RAPID ACTING)  1-5 Units Subcutaneous At Bedtime Evelia Appiah PA-C   1 Units at 02/07/25 0040    [Held by provider] insulin glargine (LANTUS PEN) injection 10 Units  10 Units Subcutaneous Daily Ashley Roper MD        melatonin tablet 5 mg  5 mg Oral At Bedtime Evelia Appiah PA-C   5 mg at 02/21/25 2200    metoprolol succinate ER (TOPROL-XL) 24 hr half-tab 12.5 mg  12.5 mg Oral BID Evelia Appiah PA-C   12.5 mg at 02/21/25 2200    midodrine (PROAMATINE) tablet 10 mg  10 mg Oral BID w/meals Evelia Appiah PA-C   10 mg at 02/22/25 0827    multivitamin RENAL (TRIPHROCAPS) capsule 1 capsule  1 capsule Oral Daily Evelia Appiah PA-C   1 capsule at 02/21/25 0839    No heparin via hemodialysis machine   Does not apply Once MARLON Ray MD        pantoprazole (PROTONIX) EC tablet 40 mg  40 mg Oral QAM AC Evelia Appiah PA-C   40 mg at 02/21/25 0839    polyethylene glycol (MIRALAX) Packet 17 g  17 g Oral Daily Evelia Appiah PA-C   17 g at 02/21/25 0840    senna-docusate (SENOKOT-S/PERICOLACE) 8.6-50 MG per tablet 1 tablet  1 tablet Oral BID Evelia Appiah,  NILSA   1 tablet at 02/21/25 2200    sevelamer carbonate (RENVELA) tablet 800 mg  800 mg Oral TID w/meals Evelia Appiah PA-C   800 mg at 02/21/25 1828    sodium chloride (PF) 0.9% PF flush 3 mL  3 mL Intracatheter Q8H Evelia Appiah PA-C   3 mL at 02/22/25 0551    sodium chloride (PF) 0.9% PF flush 9 mL  9 mL Intracatheter During Dialysis/CRRT (from stock) MARLON Ray MD        sodium chloride (PF) 0.9% PF flush 9 mL  9 mL Intracatheter During Dialysis/CRRT (from stock) MARLON Ray MD        sodium chloride 0.9% BOLUS 200 mL  200 mL Hemodialysis Machine Once MARLON Ray MD        sodium chloride 0.9% BOLUS 250 mL  250 mL Intravenous Once in dialysis/CRRT MARLON Ray MD        sodium chloride 0.9% BOLUS 500 mL  500 mL Hemodialysis Machine Once MARLON Ray MD        Warfarin Dose Required Daily - Pharmacist Managed  1 each Oral See Admin Instructions Jersey Maki MD         Current Facility-Administered Medications   Medication Dose Route Frequency Provider Last Rate Last Admin    dextrose 10% infusion   Intravenous Continuous PRN Evelia Appiah PA-C        heparin 25,000 units in 0.45% NaCl 250 mL ANTICOAGULANT infusion  0-5,000 Units/hr Intravenous Continuous Elizabeth Slater MD 16.5 mL/hr at 02/22/25 0623 1,650 Units/hr at 02/22/25 0623    Patient is already receiving anticoagulation with heparin, enoxaparin (LOVENOX), warfarin (COUMADIN)  or other anticoagulant medication   Does not apply Continuous PRN Evelia Appiah PA-C         Current active medications and PTA medications reviewed, see medication list for details.            Physical Exam:   Vitals were reviewed  Patient Vitals for the past 24 hrs:   BP Temp Temp src Pulse Resp SpO2 Weight   02/22/25 0540 -- -- -- -- -- -- 80.6 kg (177 lb 11.1 oz)   02/22/25 0404 (!) 145/98 -- -- 105 18 96 % --   02/21/25 2200 (!) 130/92 97.7  F (36.5  C) Oral 105 18 95 % --   02/21/25 0907 128/88 98.1  F (36.7   C) Oral 100 18 97 % --       Temp:  [97.7  F (36.5  C)-98.1  F (36.7  C)] 97.7  F (36.5  C)  Pulse:  [100-105] 105  Resp:  [18] 18  BP: (128-145)/(88-98) 145/98  SpO2:  [95 %-97 %] 96 %    Temperatures:  Current - Temp: 97.7  F (36.5  C); Max - Temp  Av.9  F (36.6  C)  Min: 97.7  F (36.5  C)  Max: 98.1  F (36.7  C)  Respiration range: Resp  Av  Min: 18  Max: 18  Pulse range: Pulse  Av.3  Min: 100  Max: 105  Blood pressure range: Systolic (24hrs), Av , Min:128 , Max:145   ; Diastolic (24hrs), Av, Min:88, Max:98    Pulse oximetry range: SpO2  Av %  Min: 95 %  Max: 97 %    I/O last 3 completed shifts:  In: 960 [P.O.:960]  Out: 550 [Urine:550]      Intake/Output Summary (Last 24 hours) at 2025 0840  Last data filed at 2025 2000  Gross per 24 hour   Intake 960 ml   Output 550 ml   Net 410 ml     Alert and responsive  R CVC with some erythema and blood crusting at exit site.            Wt Readings from Last 4 Encounters:   25 80.6 kg (177 lb 11.1 oz)   25 75.8 kg (167 lb)   25 78.9 kg (174 lb)   25 78.9 kg (173 lb 14.4 oz)          Data:          Lab Results   Component Value Date     2025     2025     2025    Lab Results   Component Value Date    CHLORIDE 94 2025    CHLORIDE 97 2025    CHLORIDE 95 2025    Lab Results   Component Value Date    BUN 34.2 2025    BUN 25.2 2025    BUN 35.3 2025      Lab Results   Component Value Date    POTASSIUM 5.1 2025    POTASSIUM 4.6 2025    POTASSIUM 5.7 2025    Lab Results   Component Value Date    CO2 24 2025    CO2 26 2025    CO2 24 2025    Lab Results   Component Value Date    CR 4.16 2025    CR 3.35 2025    CR 4.33 2025        Recent Labs   Lab Test 25  0428 25  0718 25   WBC 11.4* 10.4 11.8*   HGB 9.2* 9.6* 8.7*   HCT 29.6* 31.9* 27.6*   MCV 95 97 95   * 449 441      Recent Labs   Lab Test 01/30/25  1517 01/27/25  1852 01/21/25  1606   AST 12 12 17   ALT 6 8 14   ALKPHOS 115 101 246*   BILITOTAL 0.2 0.3 0.2       Recent Labs   Lab Test 02/22/25  0428 02/21/25  0718 02/20/25  0653   MAG 2.0 2.1 2.2     Recent Labs   Lab Test 02/22/25  0428 02/21/25  0718 02/20/25  0653   PHOS 3.9 3.8 4.8*     Recent Labs   Lab Test 02/22/25  0428 02/21/25  0718 02/20/25  0653   TERENCE 9.0 8.8 9.2       Lab Results   Component Value Date    TERENCE 9.0 02/22/2025     Lab Results   Component Value Date    WBC 11.4 (H) 02/22/2025    HGB 9.2 (L) 02/22/2025    HCT 29.6 (L) 02/22/2025    MCV 95 02/22/2025     (H) 02/22/2025     Lab Results   Component Value Date     (L) 02/22/2025    POTASSIUM 5.1 02/22/2025    CHLORIDE 94 (L) 02/22/2025    CO2 24 02/22/2025     (H) 02/22/2025     Lab Results   Component Value Date    BUN 34.2 (H) 02/22/2025    CR 4.16 (H) 02/22/2025     Lab Results   Component Value Date    MAG 2.0 02/22/2025     Lab Results   Component Value Date    PHOS 3.9 02/22/2025       Creatinine   Date Value Ref Range Status   02/22/2025 4.16 (H) 0.67 - 1.17 mg/dL Final   02/21/2025 3.35 (H) 0.67 - 1.17 mg/dL Final   02/20/2025 4.33 (H) 0.67 - 1.17 mg/dL Final   02/19/2025 3.36 (H) 0.67 - 1.17 mg/dL Final   02/18/2025 4.98 (H) 0.67 - 1.17 mg/dL Final   02/17/2025 4.18 (H) 0.67 - 1.17 mg/dL Final       Attestation:  I have reviewed today's vital signs, notes, medications, labs and imaging.  Seen on dialysis.     Jimbo Foreman MD

## 2025-02-22 NOTE — PLAN OF CARE
Goal Outcome Evaluation:       Shift Summary [id number]    Admitting Diagnosis: Cellulitis [L03.90]   Vitals WNL  Pain denies  A&Ox4  Voiding small amounts, on HD  Mobility AO2 lift  CMS intact  Lung Sounds diminished on RA  GI some loose stool reported Bowel medications not given  Dressing to RLE foot is intact, CVC for dialysis CDI    Orders Placed This Encounter      Advance Diet as Tolerated: Regular Diet Adult; Regular Diet Adult       Plan: R groin site appears to be improving, no pain reported by the patient and CMS in BLE are intact.  Was at HD for most of shift returned at around 1315

## 2025-02-22 NOTE — PLAN OF CARE
Date & Time: 1900-0730.  Summary: Admitted 1/21 for R leg pain and found to have R leg ulcer.   1/23 Diagnostic angiogram.   1/24 Thoracentesis.   1/27: Had R common femoral and popliteal tibial endarterectomy, R femoral to popliteal/ tibial PTFE bypass and admitted to ICU for mechanical ventilation.   2/2 Thoracentesis.   2/11 R transmetatarsal amputation.   2/12 tunneled dialysis catheter placed.  2/20 removal of peritoneal dialysis catheter.  Behavior & Aggression: Green.   Fall Risk: Yes.   Orientation: A&Ox4.  ABNL VS/O2:VSS on RA. Denied N/V.  ABNL Labs: see chart.  Pain Management: Scheduled Tylenol.  Bowel/Bladder: Pt on hemodialysis, produces little urine, using urinal at bedside. Had multiple small BMs overnight, passing gas per pt.  IV/Drains: PIV infusing heparin @ 1650 units/hr - recheck Hep Xa around 1130 am.   Skin: RLE incision sites are C/D/I. R foot amputation JUAN CARLOS w/ ace wrap. Blanchable redness to coccyx, mepilex in place. R groin site w/ improving hematoma. Old peritoneal site is C/D/I. Doppler pulses intact.  Diet: Regular.  Activity Level: not OOB, up w/ lift, Ax2w/ cares, T&R q2hrs as tolerated.  Tests/Procedures: N/A.  Anticipated  DC Date: TBD.

## 2025-02-22 NOTE — PROGRESS NOTES
Vascular progress note    Resting in bed comfortably, no complaints other than mild tenderness around the site of his recently removed PD catheter.    Vitals reviewed    On exam very comfortable, abdomen minimally tender, dressing clean dry and intact  Stable right groin hematoma, very small superficial eschar at the distal 1 cm aspect of the groin incision  Right TMA clean dry and intact, unchanged brisk Doppler signals at the distal right AT and monophasic signal on the right DP    Labs reviewed  No leukocytosis, hemoglobin 9.6 and stable  INR 1.45    Assessment:  64-year-old male with multiple comorbidities, s/p right femoral to below-knee popliteal bypass with composite greater saphenous and ringed PTFE bypass graft, now s/p right transmetatarsal amputation with remaining necrotic right heel ulcer.  Recently switched from PD to HD after tunnel line placement. S/p PD catheter removal 2/19/2025   Awaiting TCU bed while bridging back to warfarin with heparin drip as ordered by hospitalist    Plan:  Continue current cares including Plavix, high-dose statin therapy, warfarin for A-fib and right lower extremity high risk bypass graft  We will arrange outpatient follow-up    Jersey Maki MD

## 2025-02-22 NOTE — PROGRESS NOTES
Potassium   Date Value Ref Range Status   02/22/2025 5.1 3.4 - 5.3 mmol/L Final     Hemoglobin   Date Value Ref Range Status   02/22/2025 9.2 (L) 13.3 - 17.7 g/dL Final     Creatinine   Date Value Ref Range Status   02/22/2025 4.16 (H) 0.67 - 1.17 mg/dL Final     Urea Nitrogen   Date Value Ref Range Status   02/22/2025 34.2 (H) 8.0 - 23.0 mg/dL Final     Sodium   Date Value Ref Range Status   02/22/2025 132 (L) 135 - 145 mmol/L Final     INR   Date Value Ref Range Status   02/22/2025 1.60 (H) 0.85 - 1.15 Final     INR (External)   Date Value Ref Range Status   01/14/2025 4.7 (A) 0.9 - 1.1 Final       DIALYSIS PROCEDURE NOTE  Hepatitis status of previous patient on machine log was checked and verified ok to use with this patients hepatitis status.  Patient dialyzed for 3 hrs. on a K2 bath with a net fluid removal of  1.5L.  A BFR of 350 ml/min was obtained via a RCVC.      The treatment plan was discussed with Dr. Foreman during the treatment.    Total heparin received during the treatment: 0 units.   Line flushed, clamped and capped with heparin 1:1000 1.6 mL (1600 units) per lumen    Meds given: Retacrit 4,000 units (see MAR)   Complications: Frequent AP alarms shortly after starting tx. Reversed lines and was able to run at 400 BFR for about an hour then AP alarm alerted for AP of -360. Had to reduce BFR to 325 to run without alarms. During last hour or run had to switch lines back d/t frequent AP alarms and run at BFR of 300.     Person educated: Pt. Knowledge base moderate. Barriers to learning: none. Educated on procedure via verbal mode. The patient verbalized understanding.   ICEBOAT? Timeout performed pre-treatment  I: Patient was identified using 2 identifiers  C:  Consent Signed Yes  E: Equipment preventative maintenance is current and dialysis delivery system OK to use  B: Hepatitis B Surface Antigen: Non-reactive; Draw Date: 2/20/25      Hepatitis B Surface Antibody: Susceptible; Draw Date: 2/20/25  O:  Dialysis orders present and complete prior to treatment  A: Vascular access verified and assessed prior to treatment  T: Treatment was performed at a clinically appropriate time  ?: Patient was allowed to ask questions and address concerns prior to treatment  See Adult Hemodialysis flowsheet in EPIC for further details and post assessment.  Machine water alarm in place and functioning. Transducer pods intact and checked every 15min.   Pt assisted with repositioning throughout dialysis treatment.  Pt returned via bed.  Chlorine/Chloramine water system checked every 4 hours.  Outpatient Dialysis at [facility] on TTS schedule while hospitalized.      Post treatment report given to bedside RN (see flowsheet) regarding 1.5L of fluid removed, last /89.     Fredy Garcia Dialysis RN

## 2025-02-23 ENCOUNTER — APPOINTMENT (OUTPATIENT)
Dept: GENERAL RADIOLOGY | Facility: CLINIC | Age: 65
DRG: 853 | End: 2025-02-23
Attending: STUDENT IN AN ORGANIZED HEALTH CARE EDUCATION/TRAINING PROGRAM
Payer: MEDICARE

## 2025-02-23 LAB
ANION GAP SERPL CALCULATED.3IONS-SCNC: 12 MMOL/L (ref 7–15)
BUN SERPL-MCNC: 23.2 MG/DL (ref 8–23)
CALCIUM SERPL-MCNC: 8.9 MG/DL (ref 8.8–10.4)
CHLORIDE SERPL-SCNC: 96 MMOL/L (ref 98–107)
CREAT SERPL-MCNC: 3.2 MG/DL (ref 0.67–1.17)
EGFRCR SERPLBLD CKD-EPI 2021: 21 ML/MIN/1.73M2
ERYTHROCYTE [DISTWIDTH] IN BLOOD BY AUTOMATED COUNT: 20.7 % (ref 10–15)
ERYTHROCYTE [DISTWIDTH] IN BLOOD BY AUTOMATED COUNT: 20.9 % (ref 10–15)
GLUCOSE BLDC GLUCOMTR-MCNC: 105 MG/DL (ref 70–99)
GLUCOSE BLDC GLUCOMTR-MCNC: 148 MG/DL (ref 70–99)
GLUCOSE BLDC GLUCOMTR-MCNC: 155 MG/DL (ref 70–99)
GLUCOSE BLDC GLUCOMTR-MCNC: 182 MG/DL (ref 70–99)
GLUCOSE SERPL-MCNC: 119 MG/DL (ref 70–99)
HCO3 SERPL-SCNC: 23 MMOL/L (ref 22–29)
HCT VFR BLD AUTO: 31.6 % (ref 40–53)
HCT VFR BLD AUTO: 32.1 % (ref 40–53)
HGB BLD-MCNC: 9.8 G/DL (ref 13.3–17.7)
HGB BLD-MCNC: 9.8 G/DL (ref 13.3–17.7)
INR PPP: 2.32 (ref 0.85–1.15)
MAGNESIUM SERPL-MCNC: 2 MG/DL (ref 1.7–2.3)
MCH RBC QN AUTO: 29.2 PG (ref 26.5–33)
MCH RBC QN AUTO: 29.4 PG (ref 26.5–33)
MCHC RBC AUTO-ENTMCNC: 30.5 G/DL (ref 31.5–36.5)
MCHC RBC AUTO-ENTMCNC: 31 G/DL (ref 31.5–36.5)
MCV RBC AUTO: 95 FL (ref 78–100)
MCV RBC AUTO: 96 FL (ref 78–100)
PHOSPHATE SERPL-MCNC: 3.4 MG/DL (ref 2.5–4.5)
PLATELET # BLD AUTO: 432 10E3/UL (ref 150–450)
PLATELET # BLD AUTO: 468 10E3/UL (ref 150–450)
POTASSIUM SERPL-SCNC: 4.6 MMOL/L (ref 3.4–5.3)
RBC # BLD AUTO: 3.33 10E6/UL (ref 4.4–5.9)
RBC # BLD AUTO: 3.36 10E6/UL (ref 4.4–5.9)
SODIUM SERPL-SCNC: 131 MMOL/L (ref 135–145)
UFH PPP CHRO-ACNC: 0.17 IU/ML
UFH PPP CHRO-ACNC: 0.56 IU/ML
WBC # BLD AUTO: 11.6 10E3/UL (ref 4–11)
WBC # BLD AUTO: 8.9 10E3/UL (ref 4–11)

## 2025-02-23 PROCEDURE — 71045 X-RAY EXAM CHEST 1 VIEW: CPT

## 2025-02-23 PROCEDURE — 85520 HEPARIN ASSAY: CPT | Performed by: SURGERY

## 2025-02-23 PROCEDURE — 94640 AIRWAY INHALATION TREATMENT: CPT

## 2025-02-23 PROCEDURE — 85610 PROTHROMBIN TIME: CPT

## 2025-02-23 PROCEDURE — 36415 COLL VENOUS BLD VENIPUNCTURE: CPT

## 2025-02-23 PROCEDURE — 250N000013 HC RX MED GY IP 250 OP 250 PS 637

## 2025-02-23 PROCEDURE — 84100 ASSAY OF PHOSPHORUS: CPT

## 2025-02-23 PROCEDURE — 85018 HEMOGLOBIN: CPT | Performed by: STUDENT IN AN ORGANIZED HEALTH CARE EDUCATION/TRAINING PROGRAM

## 2025-02-23 PROCEDURE — 36415 COLL VENOUS BLD VENIPUNCTURE: CPT | Performed by: STUDENT IN AN ORGANIZED HEALTH CARE EDUCATION/TRAINING PROGRAM

## 2025-02-23 PROCEDURE — 250N000009 HC RX 250: Performed by: STUDENT IN AN ORGANIZED HEALTH CARE EDUCATION/TRAINING PROGRAM

## 2025-02-23 PROCEDURE — 80051 ELECTROLYTE PANEL: CPT

## 2025-02-23 PROCEDURE — 83735 ASSAY OF MAGNESIUM: CPT

## 2025-02-23 PROCEDURE — 120N000001 HC R&B MED SURG/OB

## 2025-02-23 PROCEDURE — 99232 SBSQ HOSP IP/OBS MODERATE 35: CPT | Performed by: STUDENT IN AN ORGANIZED HEALTH CARE EDUCATION/TRAINING PROGRAM

## 2025-02-23 PROCEDURE — 250N000013 HC RX MED GY IP 250 OP 250 PS 637: Performed by: SURGERY

## 2025-02-23 PROCEDURE — 36415 COLL VENOUS BLD VENIPUNCTURE: CPT | Performed by: SURGERY

## 2025-02-23 PROCEDURE — 999N000157 HC STATISTIC RCP TIME EA 10 MIN

## 2025-02-23 PROCEDURE — 250N000011 HC RX IP 250 OP 636: Performed by: STUDENT IN AN ORGANIZED HEALTH CARE EDUCATION/TRAINING PROGRAM

## 2025-02-23 RX ORDER — HEPARIN SODIUM 10000 [USP'U]/100ML
0-5000 INJECTION, SOLUTION INTRAVENOUS CONTINUOUS
Status: DISCONTINUED | OUTPATIENT
Start: 2025-02-23 | End: 2025-02-24

## 2025-02-23 RX ORDER — WARFARIN SODIUM 2 MG/1
2 TABLET ORAL
Status: DISCONTINUED | OUTPATIENT
Start: 2025-02-23 | End: 2025-02-24

## 2025-02-23 RX ORDER — ALBUTEROL SULFATE 0.83 MG/ML
2.5 SOLUTION RESPIRATORY (INHALATION)
Status: DISCONTINUED | OUTPATIENT
Start: 2025-02-23 | End: 2025-02-26

## 2025-02-23 RX ORDER — MAGNESIUM OXIDE 400 MG/1
400 TABLET ORAL EVERY 4 HOURS
Status: COMPLETED | OUTPATIENT
Start: 2025-02-23 | End: 2025-02-23

## 2025-02-23 RX ADMIN — ATORVASTATIN CALCIUM 40 MG: 40 TABLET, FILM COATED ORAL at 21:03

## 2025-02-23 RX ADMIN — ACETAMINOPHEN 975 MG: 325 TABLET, FILM COATED ORAL at 21:03

## 2025-02-23 RX ADMIN — ACETAMINOPHEN 975 MG: 325 TABLET, FILM COATED ORAL at 05:17

## 2025-02-23 RX ADMIN — SEVELAMER CARBONATE 800 MG: 800 TABLET, FILM COATED ORAL at 17:46

## 2025-02-23 RX ADMIN — HEPARIN SODIUM 1450 UNITS/HR: 10000 INJECTION, SOLUTION INTRAVENOUS at 21:04

## 2025-02-23 RX ADMIN — Medication 1 CAPSULE: at 08:36

## 2025-02-23 RX ADMIN — CALCITRIOL CAPSULES 0.25 MCG 0.25 MCG: 0.25 CAPSULE ORAL at 21:03

## 2025-02-23 RX ADMIN — ALBUTEROL SULFATE 2.5 MG: 2.5 SOLUTION RESPIRATORY (INHALATION) at 15:32

## 2025-02-23 RX ADMIN — PANTOPRAZOLE SODIUM 40 MG: 40 TABLET, DELAYED RELEASE ORAL at 08:36

## 2025-02-23 RX ADMIN — MIDODRINE HYDROCHLORIDE 10 MG: 2.5 TABLET ORAL at 17:45

## 2025-02-23 RX ADMIN — Medication 5 MG: at 21:03

## 2025-02-23 RX ADMIN — ALBUTEROL SULFATE 2.5 MG: 2.5 SOLUTION RESPIRATORY (INHALATION) at 19:31

## 2025-02-23 RX ADMIN — Medication 400 MG: at 08:36

## 2025-02-23 RX ADMIN — SENNOSIDES AND DOCUSATE SODIUM 1 TABLET: 50; 8.6 TABLET ORAL at 21:07

## 2025-02-23 RX ADMIN — MIDODRINE HYDROCHLORIDE 10 MG: 2.5 TABLET ORAL at 08:35

## 2025-02-23 RX ADMIN — SEVELAMER CARBONATE 800 MG: 800 TABLET, FILM COATED ORAL at 12:19

## 2025-02-23 RX ADMIN — CLOPIDOGREL BISULFATE 75 MG: 75 TABLET ORAL at 21:03

## 2025-02-23 RX ADMIN — Medication 400 MG: at 12:19

## 2025-02-23 RX ADMIN — METOPROLOL SUCCINATE 12.5 MG: 25 TABLET, EXTENDED RELEASE ORAL at 08:36

## 2025-02-23 RX ADMIN — METOPROLOL SUCCINATE 12.5 MG: 25 TABLET, EXTENDED RELEASE ORAL at 21:03

## 2025-02-23 ASSESSMENT — ACTIVITIES OF DAILY LIVING (ADL)
ADLS_ACUITY_SCORE: 68
ADLS_ACUITY_SCORE: 70
ADLS_ACUITY_SCORE: 68
ADLS_ACUITY_SCORE: 66
ADLS_ACUITY_SCORE: 70
ADLS_ACUITY_SCORE: 70
ADLS_ACUITY_SCORE: 68
ADLS_ACUITY_SCORE: 70
ADLS_ACUITY_SCORE: 70
ADLS_ACUITY_SCORE: 68
ADLS_ACUITY_SCORE: 70
ADLS_ACUITY_SCORE: 66
ADLS_ACUITY_SCORE: 68
ADLS_ACUITY_SCORE: 70
ADLS_ACUITY_SCORE: 68
ADLS_ACUITY_SCORE: 66
ADLS_ACUITY_SCORE: 70
ADLS_ACUITY_SCORE: 68
ADLS_ACUITY_SCORE: 66
ADLS_ACUITY_SCORE: 70

## 2025-02-23 NOTE — PROGRESS NOTES
[facility]    Medicine Progress Note - Hospitalist Service    Date of Admission:  1/21/2025    Assessment & Plan   Arnulfo Pritchett is a 64 year old male with past medical history of severe heart failure (EF 30%), ESRD (on peritoneal dialysis), chronic paroxysmal AF on warfarin, LLE PAD s/p endarterectomy, RCC s/p nephrectomy admitted on 1/21/2025 for septic shock secondary to right leg PAD with worsening right heel necrotic ulcer. The patient was seen in ED at outside hospital on 1/3/25 and diagnosed with pneumonia, later completing course of Cefdinir. He then presented on 1/21/25 to outside hospital for right leg pain and found to have right leg ulcer and transferred to [facility] for vascular surgery evaluation. Now s/p right common femoral BK popliteal/tibial endarterectomy and right common femoral to below-knee popliteal/tibial PTFE bypass performed on 1/27/25. The patient was admitted to the ICU for requirement of mechanical ventilation and pressor support following surgery for multifactorial shock.       Hospitalist service consulted 1/31/2025 for transfer out of ICU and to assist with medical management along with vascular surgery, podiatry and Infectious disease.     Hx PAD of LLE s/p endarterectomy and fem-tibioperoneal trunk bypass on 10/25/24  Hx RLE arterial occlusion s/p SFA angioplasty and stent 5/2024  PAD RLE, right heel gangrene, occluded SFA, no evidence of osteomyelitis  s/p right common femoral and popliteal/tibial endarterectomy, right femoral to popliteal/tibial PTFE bypass 1/27/25   # Right groin hematoma resolving   -Found to have critical limb ischemia on admission on January 21, for details see admission note.   -Distributive shock following surgery followed by the ICU service until 1/31.  -Followed by vascular surgery, podiatry, wound care, and ID.  -Coumadin has been held since admission, was was started on IV heparin drip since admission.  -Restarted warfarin on  2/14/2025 after discussion with vascular surgery.  INR now in goal range, heparin gtt discontinued.  -Has been maintained on statin and Plavix 75 mg daily during this hospital stay.  -Recent IV piperacillin/tazobactam (Zosyn), discontinued 2/2 per ID after 12 days of Zosyn; follow-up cultures, monitor off antibiotics.  -Wound care per surgery and WOC.  -pain control, see hospital orders.  -worsening ischemic changes in 2nd-5th toes of right foot.  -Underwent transmetatarsal amputation on 2/11/2025.  Tolerated procedure well.  Intraoperatively no signs of infection.  No further surgery per podiatry.  -Leukocytosis improved postsurgery.  Hemoglobin trended down to 7 on 2/13/2025 requiring transfusion, Hgb stable since then.  -INR has been subtherapeutic at 1.5 and will bridge with low intensity heparin drip     Multifactorial shock, resolved  ICU admission, weaned off vasopressors 1/30.  -Transitioned to midodrine with increase in prior to admission dose on1/31.  -Monitor vitals     End-stage renal disease (ESRD), on peritoneal dialysis  Hx of secondary hyperparathyroidism and hyperphosphatemia, phosphorous elevated above baseline of 6 at most recent of 7.9  Nephrology following.  -Chronic peritoneal dialysis for the last 3.5 years. Patient expressed interest to switch to HD. Nephrology discussed with his primary nephrologist who agrees to transition to hemodialysis.  Underwent right tunneled IJ catheter placement by IR on 2/12/2025.  Now undergoing HD on MWF schedule.  - On 2/16, patient having some episodes of shortness of breath that last 30-60 seconds. ECG without any changes concerning for ACS. CXR shows moderate partially loculated R pleural effusion which is the same to slightly worse from his last CXR. Currently satting well on room air. Will restart albuterol inhaler that patient was using outpatient after his recent pneumonia and monitor for improvement after dialysis tomorrow, possible more volume needs to  be taken off during HD. If no improvement with SOB after dialysis run tomorrow, would place IR consult for repeat thoracentesis.  -Continue midodrine.  -Peritoneal dialysis catheter removed 12/19 by vascular surgery  -INR at 1.5 on 2/20/25  -Heparin bridging started      acute hypoxemic respiratory failure, resolved  Right pleural effusion  Status post thoracentesis 1/24/25, repeat 2/2/25  -Following surgery on admission by vascular surgery was intubated.  Recent pneumonia treated with 3 weeks of Ceftin prior to this admission. Extubated 1/29/25  -Right pleural effusion.  Status post thoracentesis 1/24/25 with 1.5 L clear yellow fluid removed, likely transudative based on low cell count of 117, , and protein of 1.7. Possibly secondary to reduced peritoneal dialysis during hospitalization vs underlying severe HFrEF (30%).  -CT chest 2/1/25, see report, no clear pneumonia; large right pleural effusion noted.  -Now off oxygen. Encourage incentive spirometry.  -Antibiotics as above, discontinued by ID 2/2  -ID consulted 2/1 as above, recommend monitoring patient off antibiotics  -S/p repeat Right thoracentesis on 2/2 with 2.3 L of rolan fluid removed; additional studies, cultures (negative to date), cytology (negative for malignancy)   2/16/25 -Moderate to large partially loculated right effusion and small left effusion with atelectasis. Overall appearance is similar when compared to prior   examination.  Might need a repeat Thoracentesis on Monday        Paroxysmal atrial fibrillation  Chronic anticoagulation prior to admission, warfarin - ON HOLD  Ischemic cardiomyopathy with HLD, CAD s/p multiple stents, and severe HFrEF (30%)   Troponin elevation, likely type 2 MI  Wide complex tachycardia 2/1/25  Assessment-postoperatively has been maintained on heparin drip.  Coumadin has been held during his hospital stay.  -Echo 1/31-EF 25 to 30%.  Severe global hypokinesis with abnormal septal motion consistent with  conduction abnormality.  Severe inferior wall hypokinesis.  LVH.  RV mildly dilated and mildly reduced RV function.  Mild mitral regurgitation.  Mild to moderate mitral stenosis.  Moderate aortic stenosis.  Left ventricular fairly pressures elevated.  -Episode of wide-complex tachycardia noted by nursing staff, 15 beats, up on 2/1/2025.  -Continue inpatient telemetry.  -EP consulted on 2/16 given tachycardia with rates to 120s. They evaluated patient and said not unexpected given patient's severe HFrEF.              - Recommended increasing Toprol XL to 12.5 mg BID (PTA was on 25 mg daily)    -Troponins have been flat at 515.  Likely type II MI secondary to end-stage renal disease troponins are down from admission troponins of 693         # Confusion  # Metabolic encephalopathy resolved  -Patient refused MRI scan       Anemia of chronic disease  Baseline around 10.  Has been stable in the 8 range. Did need 1 U PRBC on 2/8 and 2/13 for Hgb<7 without signs of active bleeding.  -Nephrology started patient on EPO on 2/9 and to continue weekly.  -Continue to monitor hemoglobin daily,       # Hyponatremia  Hyperkalemia  Continue hemodialysis      Hyperglycemia  Type 2 diabetes   hemoglobin A1c 5.7% October 24  PTA regimen: Lantus 35 units at bedtime  Insulin requirements have been decreasing and he started having hypoglycemia episodes after being started on HD on 2/13/2025  Discontinue Lantus and carb coverage  Continue sliding scale insulin. Discharge insulin dosing dependent on requirements from sliding scale.  Continue to monitor FSBS       Mixed anion gap acidosis, resolved  -Improved respiratory function, monitor.  Anion gap resolved 1/31.     Abdominal discomfort 1/30, resolved  Noted to have some right upper quadrant pain 1/30.  Now resolved.  LFTs normal.  Was on Zosyn for above surgical issues.   -Monitor abdominal exam.  -Antibiotics managed as noted above.     Moderate protein calorie nutrition  -Nutrition  consulted.  -Speech and language therapy (SLP) consulted, diet per speech therapy.     Gout  -PTA allopurinol currently on hold, reassess daily.     History of renal cell carcinoma   # Patient had CTA done in in October 2024 which showed a 4 mm hypoenhancing nodule within the lower part of the left kidney which radiology said that might represent a small primary renal cell carcinoma.  Patient has been aware of this findings and did not follow-up with outpatient  CT abdomen pelvis done in July 2025 showed cyst in the left kidney  -Did not wanted workup with urology but today he said that he is open to seeing urology at [facility]  -Case management consulted      -Status post right nephrectomy, monitor.     History of obstructive sleep apnea.  By report, intolerant of CPAP   -Monitor, follow-up with outpatient provider.     Weakness and deconditioning  -PT, OT, speech and language therapy (SLP) consulted.  -Increase activity as tolerated.  -Currently TCU recommended. If respiratory status improved and patient feeling better after HD run tomorrow, would be medically ready for discharge to TCU if cleared by vascular surgery as well.     Disposition  Anticipated discharge timing see Disposition Plan below.  -Anticipated discharge to transitional care unit.  -Social work consulted for discharge planning.    #  # Goals of care were discussed with the patient on 2/20/25 and he does not want to be full code and does not want ribs to be broken or be on the ventilator and code status changed to DNR                        Diet: Snacks/Supplements Adult: Ensure Enlive; Between Meals  Advance Diet as Tolerated: Regular Diet Adult; Regular Diet Adult    DVT Prophylaxis: Warfarin  Rosario Catheter: Not present  Lines: PRESENT      CVC Double Lumen Right Internal jugular Non - valved (open ended);Tunneled-Site Assessment: WDL      Cardiac Monitoring: None  Code Status: No CPR- Do NOT Intubate      Clinically Significant Risk Factors          # Hyponatremia: Lowest Na = 132 mmol/L in last 2 days, will monitor as appropriate  # Hypochloremia: Lowest Cl = 94 mmol/L in last 2 days, will monitor as appropriate      # Hypoalbuminemia: Lowest albumin = 1.6 g/dL at 2/12/2025  7:26 AM, will monitor as appropriate     # Hypertension: Noted on problem list  # Chronic heart failure with reduced ejection fraction: last echo with EF <40%          # DMII: A1C = 6.6 % (Ref range: <5.7 %) within past 6 months    # Severe Malnutrition: based on nutrition assessment    # Financial/Environmental Concerns:           Social Drivers of Health    Tobacco Use: Medium Risk (2/19/2025)    Patient History     Smoking Tobacco Use: Former     Smokeless Tobacco Use: Never   Alcohol Use: Unknown (11/28/2018)    Received from [facility], [facility], [place]    AUDIT-C     Frequency of Alcohol Consumption: Monthly or less     Frequency of Binge Drinking: Never   Transportation Needs: High Risk (1/21/2025)    Transportation Needs     Within the past 12 months, has lack of transportation kept you from medical appointments, getting your medicines, non-medical meetings or appointments, work, or from getting things that you need?: Yes   Physical Activity: Unknown (10/8/2024)    Exercise Vital Sign     Days of Exercise per Week: 0 days   Social Connections: Unknown (10/8/2024)    Social Connection and Isolation Panel [NHANES]     Frequency of Social Gatherings with Friends and Family: Patient declined          Disposition Plan     Medically Ready for Discharge: Anticipated in 2-4 Days             Elizabeth Slater MD  Hospitalist Service  [facility]  Securely message with [employer] (more info)  Text page via [employer] Paging/Directory   ______________________________________________________________________    Interval History     No acute overnight events  Patient seen and examined at  bedside    Denies any chest pain had some shortness of breath on dialysis                Physical Exam   Vital Signs: Temp: 97.2  F (36.2  C) Temp src: Oral BP: 121/84 Pulse: 95   Resp: 14 SpO2: 99 % O2 Device: None (Room air) Oxygen Delivery: 2 LPM  Weight: 177 lbs 11.05 oz    Physical Exam  Cardiovascular:      Rate and Rhythm: Normal rate and regular rhythm.      Heart sounds: Normal heart sounds.   Pulmonary:      Effort: Pulmonary effort is normal. No respiratory distress.      Breath sounds: Normal breath sounds.   Abdominal:      General: There is no distension.      Palpations: Abdomen is soft.      Tenderness: There is no abdominal tenderness.          Medical Decision Making       40 MINUTES SPENT BY ME on the date of service doing chart review, history, exam, documentation & further activities per the note.      Data     I have personally reviewed the following data over the past 24 hrs:    11.4 (H)  \   9.2 (L)   / 451 (H)     132 (L) 94 (L) 34.2 (H) /  115 (H)   5.1 24 4.16 (H) \     INR:  1.60 (H) PTT:  N/A   D-dimer:  N/A Fibrinogen:  N/A       Imaging results reviewed over the past 24 hrs:   No results found for this or any previous visit (from the past 24 hours).

## 2025-02-23 NOTE — PROGRESS NOTES
[facility]    Medicine Progress Note - Hospitalist Service    Date of Admission:  1/21/2025    Assessment & Plan   Arnulfo Pritchett is a 64 year old male with past medical history of severe heart failure (EF 30%), ESRD (on peritoneal dialysis), chronic paroxysmal AF on warfarin, LLE PAD s/p endarterectomy, RCC s/p nephrectomy admitted on 1/21/2025 for septic shock secondary to right leg PAD with worsening right heel necrotic ulcer. The patient was seen in ED at outside hospital on 1/3/25 and diagnosed with pneumonia, later completing course of Cefdinir. He then presented on 1/21/25 to outside hospital for right leg pain and found to have right leg ulcer and transferred to [facility] for vascular surgery evaluation. Now s/p right common femoral BK popliteal/tibial endarterectomy and right common femoral to below-knee popliteal/tibial PTFE bypass performed on 1/27/25. The patient was admitted to the ICU for requirement of mechanical ventilation and pressor support following surgery for multifactorial shock.       Hospitalist service consulted 1/31/2025 for transfer out of ICU and to assist with medical management along with vascular surgery, podiatry and Infectious disease.     Hx PAD of LLE s/p endarterectomy and fem-tibioperoneal trunk bypass on 10/25/24  Hx RLE arterial occlusion s/p SFA angioplasty and stent 5/2024  PAD RLE, right heel gangrene, occluded SFA, no evidence of osteomyelitis  s/p right common femoral and popliteal/tibial endarterectomy, right femoral to popliteal/tibial PTFE bypass 1/27/25   # Right groin hematoma resolving   -Found to have critical limb ischemia on admission on January 21, for details see admission note.   -Distributive shock following surgery followed by the ICU service until 1/31.  -Followed by vascular surgery, podiatry, wound care, and ID.  -Coumadin has been held since admission, was was started on IV heparin drip since admission.  -Restarted warfarin on  2/14/2025 after discussion with vascular surgery.  INR now in goal range, heparin gtt discontinued.  -Has been maintained on statin and Plavix 75 mg daily during this hospital stay.  -Recent IV piperacillin/tazobactam (Zosyn), discontinued 2/2 per ID after 12 days of Zosyn; follow-up cultures, monitor off antibiotics.  -Wound care per surgery and WOC.  -pain control, see hospital orders.  -worsening ischemic changes in 2nd-5th toes of right foot.  -Underwent transmetatarsal amputation on 2/11/2025.  Tolerated procedure well.  Intraoperatively no signs of infection.  No further surgery per podiatry.  -Leukocytosis improved postsurgery.  Hemoglobin trended down to 7 on 2/13/2025 requiring transfusion, Hgb stable since then.   Multifactorial shock, resolved  ICU admission, weaned off vasopressors 1/30.  -Transitioned to midodrine with increase in prior to admission dose on1/31.  -Monitor vitals     End-stage renal disease (ESRD), on peritoneal dialysis  Hx of secondary hyperparathyroidism and hyperphosphatemia, phosphorous elevated above baseline of 6 at most recent of 7.9  Nephrology following.  -Chronic peritoneal dialysis for the last 3.5 years. Patient expressed interest to switch to HD. Nephrology discussed with his primary nephrologist who agrees to transition to hemodialysis.  Underwent right tunneled IJ catheter placement by IR on 2/12/2025.  Now undergoing HD on MWF schedule.  - On 2/16, patient having some episodes of shortness of breath that last 30-60 seconds. ECG without any changes concerning for ACS. CXR shows moderate partially loculated R pleural effusion which is the same to slightly worse from his last CXR. Currently satting well on room air. Will restart albuterol inhaler that patient was using outpatient after his recent pneumonia and monitor for improvement after dialysis tomorrow, possible more volume needs to be taken off during HD. If no improvement with SOB after dialysis run tomorrow, would  place IR consult for repeat thoracentesis.  -Continue midodrine.  -Peritoneal dialysis catheter removed 12/19 by vascular surgery      acute hypoxemic respiratory failure, resolved  Right pleural effusion  Status post thoracentesis 1/24/25, repeat 2/2/25  -Following surgery on admission by vascular surgery was intubated.  Recent pneumonia treated with 3 weeks of Ceftin prior to this admission. Extubated 1/29/25  -Right pleural effusion.  Status post thoracentesis 1/24/25 with 1.5 L clear yellow fluid removed, likely transudative based on low cell count of 117, , and protein of 1.7. Possibly secondary to reduced peritoneal dialysis during hospitalization vs underlying severe HFrEF (30%).  -CT chest 2/1/25, see report, no clear pneumonia; large right pleural effusion noted.  -Now off oxygen. Encourage incentive spirometry.  -Antibiotics as above, discontinued by ID 2/2  -ID consulted 2/1 as above, recommend monitoring patient off antibiotics  -S/p repeat Right thoracentesis on 2/2 with 2.3 L of rolan fluid removed; additional studies, cultures (negative to date), cytology (negative for malignancy)   2/16/25 -Moderate to large partially loculated right effusion and small left effusion with atelectasis. Overall appearance is similar when compared to prior   examination.  Might need a repeat Thoracentesis on Monday 2/19/25 -complaining of more shortness of breath has albuterol inhaler changed to nebulizers.  Chest x-ray ordered.  Patient saturating at 97% on room air.  Patient might need a repeat thoracentesis tomorrow.  Will continue heparin drip and will ask pharmacy to hold Coumadin dosing tonight in case he needs Thora tomorrow  -Also discussed with Dr. Spaulding from nephrology who might dialyze patient tomorrow    Discussed with pharmacy INR 2.3.  Will hold Coumadin dose tonight in case he needs thoaracenteis  continue heparin drip without any boluses       Paroxysmal atrial fibrillation  Chronic  anticoagulation prior to admission, warfarin - ON HOLD  Ischemic cardiomyopathy with HLD, CAD s/p multiple stents, and severe HFrEF (30%)   Troponin elevation, likely type 2 MI  Wide complex tachycardia 2/1/25  Assessment-postoperatively has been maintained on heparin drip.  Coumadin has been held during his hospital stay.  -Echo 1/31-EF 25 to 30%.  Severe global hypokinesis with abnormal septal motion consistent with conduction abnormality.  Severe inferior wall hypokinesis.  LVH.  RV mildly dilated and mildly reduced RV function.  Mild mitral regurgitation.  Mild to moderate mitral stenosis.  Moderate aortic stenosis.  Left ventricular fairly pressures elevated.  -Episode of wide-complex tachycardia noted by nursing staff, 15 beats, up on 2/1/2025.  -Continue inpatient telemetry.  -EP consulted on 2/16 given tachycardia with rates to 120s. They evaluated patient and said not unexpected given patient's severe HFrEF.              - Recommended increasing Toprol XL to 12.5 mg BID (PTA was on 25 mg daily)    -Troponins have been flat at 515.  Likely type II MI secondary to end-stage renal disease troponins are down from admission troponins of 693         # Confusion  # Metabolic encephalopathy resolved  -Patient refused MRI scan       Anemia of chronic disease  Baseline around 10.  Has been stable in the 8 range. Did need 1 U PRBC on 2/8 and 2/13 for Hgb<7 without signs of active bleeding.  -Nephrology started patient on EPO on 2/9 and to continue weekly.  -Continue to monitor hemoglobin daily,       # Hyponatremia  Hyperkalemia  Continue hemodialysis      Hyperglycemia  Type 2 diabetes   hemoglobin A1c 5.7% October 24  PTA regimen: Lantus 35 units at bedtime  Insulin requirements have been decreasing and he started having hypoglycemia episodes after being started on HD on 2/13/2025  Discontinue Lantus and carb coverage  Continue sliding scale insulin. Discharge insulin dosing dependent on requirements from sliding  scale.  Continue to monitor FSBS       Mixed anion gap acidosis, resolved  -Improved respiratory function, monitor.  Anion gap resolved 1/31.     Abdominal discomfort 1/30, resolved  Noted to have some right upper quadrant pain 1/30.  Now resolved.  LFTs normal.  Was on Zosyn for above surgical issues.   -Monitor abdominal exam.  -Antibiotics managed as noted above.     Moderate protein calorie nutrition  -Nutrition consulted.  -Speech and language therapy (SLP) consulted, diet per speech therapy.     Gout  -PTA allopurinol resolved and changed to dialysis dosing 100 mg 3 times weekly    History of renal cell carcinoma   # Patient had CTA done in in October 2024 which showed a 4 mm hypoenhancing nodule within the lower part of the left kidney which radiology said that might represent a small primary renal cell carcinoma.  Patient has been aware of this findings and did not follow-up with outpatient  CT abdomen pelvis done in July 2025 showed cyst in the left kidney  -Did not wanted workup with urology but today he said that he is open to seeing urology at [facility]  -Case management consulted      -Status post right nephrectomy, monitor.     History of obstructive sleep apnea.  By report, intolerant of CPAP   -Monitor, follow-up with outpatient provider.     Weakness and deconditioning  -PT, OT, speech and language therapy (SLP) consulted.  -Increase activity as tolerated.  -Currently TCU recommended. If respiratory status improved and patient feeling better after HD run tomorrow, would be medically ready for discharge to TCU if cleared by vascular surgery as well.     Disposition  Anticipated discharge timing see Disposition Plan below.  -Anticipated discharge to transitional care unit.  -Social work consulted for discharge planning.    # Goals of care were discussed with the patient on 2/20/25 and he does not want to be full code and does not want ribs to be broken or be on the ventilator and code status changed  to DNR     Family communication called patient's son Ervin could not reach him and left a voicemail                       Diet: Snacks/Supplements Adult: Ensure Enlive; Between Meals  Advance Diet as Tolerated: Regular Diet Adult; Regular Diet Adult    DVT Prophylaxis: Warfarin  Rosario Catheter: Not present  Lines: PRESENT      CVC Double Lumen Right Internal jugular Non - valved (open ended);Tunneled-Site Assessment: WDL      Cardiac Monitoring: None  Code Status: No CPR- Do NOT Intubate      Clinically Significant Risk Factors         # Hyponatremia: Lowest Na = 131 mmol/L in last 2 days, will monitor as appropriate  # Hypochloremia: Lowest Cl = 94 mmol/L in last 2 days, will monitor as appropriate      # Hypoalbuminemia: Lowest albumin = 1.6 g/dL at 2/12/2025  7:26 AM, will monitor as appropriate     # Hypertension: Noted on problem list  # Chronic heart failure with reduced ejection fraction: last echo with EF <40%          # DMII: A1C = 6.6 % (Ref range: <5.7 %) within past 6 months    # Severe Malnutrition: based on nutrition assessment    # Financial/Environmental Concerns:           Social Drivers of Health    Tobacco Use: Medium Risk (2/19/2025)    Patient History     Smoking Tobacco Use: Former     Smokeless Tobacco Use: Never   Alcohol Use: Unknown (11/28/2018)    Received from [facility], [facility]    AUDIT-C     Frequency of Alcohol Consumption: Monthly or less     Frequency of Binge Drinking: Never   Transportation Needs: High Risk (1/21/2025)    Transportation Needs     Within the past 12 months, has lack of transportation kept you from medical appointments, getting your medicines, non-medical meetings or appointments, work, or from getting things that you need?: Yes   Physical Activity: Unknown (10/8/2024)    Exercise Vital Sign     Days of Exercise per Week: 0 days   Social Connections: Unknown (10/8/2024)    Social Connection  and Isolation Panel [NHANES]     Frequency of Social Gatherings with Friends and Family: Patient declined          Disposition Plan     Medically Ready for Discharge: Anticipated Tomorrow             Elizabeth Slater MD  Hospitalist Service  [facility]  Securely message with [employer] (more info)  Text page via [employer] Paging/Directory   ______________________________________________________________________    Interval History   No acute overnight events.  Patient denies any chest pain.  Patient felt more short of breath.  He saturating 97%.  On room air.    Denies any abdominal pain.      Physical Exam   Vital Signs: Temp: 97.5  F (36.4  C) Temp src: Oral BP: (!) 129/97 Pulse: 107   Resp: 14 SpO2: 100 % O2 Device: None (Room air)    Weight: 162 lbs 4.14 oz    Physical Exam  Exam conducted with a chaperone present.   Pulmonary:      Effort: Pulmonary effort is normal. No respiratory distress.      Comments: Decreased breath sounds bilaterally  Abdominal:      Palpations: Abdomen is soft.   Genitourinary:     Comments: Groin hematoma present  Neurological:      Mental Status: He is alert.          Medical Decision Making       45 MINUTES SPENT BY ME on the date of service doing chart review, history, exam, documentation & further activities per the note.      Data     I have personally reviewed the following data over the past 24 hrs:    8.9  \   9.8 (L)   / 432     131 (L) 96 (L) 23.2 (H) /  155 (H)   4.6 23 3.20 (H) \     INR:  2.32 (H) PTT:  N/A   D-dimer:  N/A Fibrinogen:  N/A       Imaging results reviewed over the past 24 hrs:   Recent Results (from the past 24 hours)   XR Chest Port 1 View    Narrative    EXAM: XR CHEST PORT 1 VIEW  LOCATION: [facility]  DATE: 2/23/2025    INDICATION: shortness of breath  COMPARISON: 2/16/2025      Impression    IMPRESSION: Moderate size right pleural effusion extending into the major fissure with patchy atelectasis of  the right lung, unchanged. Small left pleural effusion also stable. Stable cardiomegaly. No new focal airspace opacity or pneumothorax. Right IJ   dialysis catheter terminates in the proximal right atrium.

## 2025-02-23 NOTE — PLAN OF CARE
Goal Outcome Evaluation:    Date & Time: 2/23/25 ([id number])  Summary: Admitted 1/21 for R leg pain and found to have R leg ulcer.   1/23 Diagnostic angiogram.   1/24 Thoracentesis.   1/27: Had R common femoral and popliteal tibial endarterectomy, R femoral to popliteal/ tibial PTFE bypass and admitted to ICU for mechanical ventilation.   2/2 Thoracentesis.   2/11 R transmetatarsal amputation.   2/12 tunneled dialysis catheter placed.  2/20 removal of peritoneal dialysis catheter.  Orientation: A&O x4  ABNL VS/O2: VSS on RA  ABNL Labs: see labs  Pain Management: denies  Bowel/Bladder: HD pt, but still make some urine, urinal at bedside. Incontinent of bowel.   Drains: R PIV SL. R HD port. L PIV hep gtt infusing, hep 10a recheck at 14:25.   Wounds/incisions: PD site. R groin inc. R wound inc. RLE wound.   Diet: regular   Activity Level: assist of 2 lift, refused oob   Anticipated  DC Date: TCU pending.   Significant Information:  Mg replaced, recheck in am. Plan to have thoracentesis on Monday.

## 2025-02-23 NOTE — PROVIDER NOTIFICATION
Pt c/o SOB, O2 satting well 97-99% RA. Spoke to Dr. Slater, will order nebs treatment, x-ray, and might have thoracentesis today depending on x-ray result. And might have hemo dialysis back on Monday.

## 2025-02-23 NOTE — PLAN OF CARE
Date & Time: 1900-0730.  Summary: Admitted 1/21 for R leg pain and found to have R leg ulcer.   1/23 Diagnostic angiogram.   1/24 Thoracentesis.   1/27: Had R common femoral and popliteal tibial endarterectomy, R femoral to popliteal/ tibial PTFE bypass and admitted to ICU for mechanical ventilation.   2/2 Thoracentesis.   2/11 R transmetatarsal amputation.   2/12 tunneled dialysis catheter placed.  2/20 removal of peritoneal dialysis catheter.  Behavior & Aggression: Green.   Fall Risk: Yes.   Orientation: A&Ox4.  ABNL VS/O2:VSS on RA. Denied N/V.  ABNL Labs: see chart.  Pain Management: Scheduled Tylenol.  Bowel/Bladder: Pt on hemodialysis, produces little urine, using urinal at bedside. Had multiple small BMs overnight, passing gas per pt.  IV/Drains: PIV infusing heparin @ 1500 units/hr - recheck Hep Xa in am labs.  Skin: RLE incision sites are C/D/I. R foot amputation JUAN CARLOS w/ ace wrap. Blanchable redness to coccyx, mepilex in place. R groin site w/ improving hematoma. Old peritoneal site is C/D/I. Doppler pulses intact.  Diet: Regular.  Activity Level: not OOB, up w/ lift, Ax2w/ cares, T&R q2hrs as tolerated.  Tests/Procedures: N/A. Plan for another possible thoracentesis on Monday.  Anticipated  DC Date: TBD.

## 2025-02-24 ENCOUNTER — APPOINTMENT (OUTPATIENT)
Dept: ULTRASOUND IMAGING | Facility: CLINIC | Age: 65
DRG: 853 | End: 2025-02-24
Attending: INTERNAL MEDICINE
Payer: MEDICARE

## 2025-02-24 LAB
ANION GAP SERPL CALCULATED.3IONS-SCNC: 15 MMOL/L (ref 7–15)
BUN SERPL-MCNC: 37.2 MG/DL (ref 8–23)
CALCIUM SERPL-MCNC: 9.5 MG/DL (ref 8.8–10.4)
CHLORIDE SERPL-SCNC: 96 MMOL/L (ref 98–107)
CREAT SERPL-MCNC: 4.3 MG/DL (ref 0.67–1.17)
EGFRCR SERPLBLD CKD-EPI 2021: 15 ML/MIN/1.73M2
ERYTHROCYTE [DISTWIDTH] IN BLOOD BY AUTOMATED COUNT: 20.4 % (ref 10–15)
GLUCOSE BLDC GLUCOMTR-MCNC: 112 MG/DL (ref 70–99)
GLUCOSE BLDC GLUCOMTR-MCNC: 139 MG/DL (ref 70–99)
GLUCOSE BLDC GLUCOMTR-MCNC: 145 MG/DL (ref 70–99)
GLUCOSE BLDC GLUCOMTR-MCNC: 174 MG/DL (ref 70–99)
GLUCOSE BLDC GLUCOMTR-MCNC: 91 MG/DL (ref 70–99)
GLUCOSE SERPL-MCNC: 136 MG/DL (ref 70–99)
HCO3 SERPL-SCNC: 22 MMOL/L (ref 22–29)
HCT VFR BLD AUTO: 31.4 % (ref 40–53)
HGB BLD-MCNC: 9.4 G/DL (ref 13.3–17.7)
INR PPP: 2.76 (ref 0.85–1.15)
MAGNESIUM SERPL-MCNC: 2.2 MG/DL (ref 1.7–2.3)
MCH RBC QN AUTO: 28.6 PG (ref 26.5–33)
MCHC RBC AUTO-ENTMCNC: 29.9 G/DL (ref 31.5–36.5)
MCV RBC AUTO: 95 FL (ref 78–100)
PHOSPHATE SERPL-MCNC: 4.3 MG/DL (ref 2.5–4.5)
PLATELET # BLD AUTO: 471 10E3/UL (ref 150–450)
POTASSIUM SERPL-SCNC: 5.3 MMOL/L (ref 3.4–5.3)
RBC # BLD AUTO: 3.29 10E6/UL (ref 4.4–5.9)
SODIUM SERPL-SCNC: 133 MMOL/L (ref 135–145)
UFH PPP CHRO-ACNC: 0.24 IU/ML
UFH PPP CHRO-ACNC: 0.3 IU/ML
WBC # BLD AUTO: 9.4 10E3/UL (ref 4–11)

## 2025-02-24 PROCEDURE — 250N000013 HC RX MED GY IP 250 OP 250 PS 637

## 2025-02-24 PROCEDURE — 36415 COLL VENOUS BLD VENIPUNCTURE: CPT | Performed by: STUDENT IN AN ORGANIZED HEALTH CARE EDUCATION/TRAINING PROGRAM

## 2025-02-24 PROCEDURE — 272N000706 US THORACENTESIS

## 2025-02-24 PROCEDURE — 250N000013 HC RX MED GY IP 250 OP 250 PS 637: Performed by: STUDENT IN AN ORGANIZED HEALTH CARE EDUCATION/TRAINING PROGRAM

## 2025-02-24 PROCEDURE — 250N000011 HC RX IP 250 OP 636: Performed by: STUDENT IN AN ORGANIZED HEALTH CARE EDUCATION/TRAINING PROGRAM

## 2025-02-24 PROCEDURE — 80048 BASIC METABOLIC PNL TOTAL CA: CPT

## 2025-02-24 PROCEDURE — 84100 ASSAY OF PHOSPHORUS: CPT

## 2025-02-24 PROCEDURE — 99232 SBSQ HOSP IP/OBS MODERATE 35: CPT | Performed by: INTERNAL MEDICINE

## 2025-02-24 PROCEDURE — 250N000009 HC RX 250: Performed by: RADIOLOGY

## 2025-02-24 PROCEDURE — 83735 ASSAY OF MAGNESIUM: CPT

## 2025-02-24 PROCEDURE — 120N000001 HC R&B MED SURG/OB

## 2025-02-24 PROCEDURE — 94640 AIRWAY INHALATION TREATMENT: CPT | Mod: 76

## 2025-02-24 PROCEDURE — 85610 PROTHROMBIN TIME: CPT

## 2025-02-24 PROCEDURE — 85520 HEPARIN ASSAY: CPT | Performed by: STUDENT IN AN ORGANIZED HEALTH CARE EDUCATION/TRAINING PROGRAM

## 2025-02-24 PROCEDURE — 999N000157 HC STATISTIC RCP TIME EA 10 MIN

## 2025-02-24 PROCEDURE — 85018 HEMOGLOBIN: CPT | Performed by: STUDENT IN AN ORGANIZED HEALTH CARE EDUCATION/TRAINING PROGRAM

## 2025-02-24 PROCEDURE — 250N000009 HC RX 250: Performed by: STUDENT IN AN ORGANIZED HEALTH CARE EDUCATION/TRAINING PROGRAM

## 2025-02-24 PROCEDURE — 85520 HEPARIN ASSAY: CPT | Performed by: SURGERY

## 2025-02-24 PROCEDURE — 0W993ZZ DRAINAGE OF RIGHT PLEURAL CAVITY, PERCUTANEOUS APPROACH: ICD-10-PCS | Performed by: RADIOLOGY

## 2025-02-24 PROCEDURE — 94640 AIRWAY INHALATION TREATMENT: CPT

## 2025-02-24 PROCEDURE — 84132 ASSAY OF SERUM POTASSIUM: CPT

## 2025-02-24 RX ORDER — WARFARIN SODIUM 3 MG/1
3 TABLET ORAL
Status: COMPLETED | OUTPATIENT
Start: 2025-02-24 | End: 2025-02-24

## 2025-02-24 RX ORDER — LIDOCAINE HYDROCHLORIDE 10 MG/ML
10 INJECTION, SOLUTION EPIDURAL; INFILTRATION; INTRACAUDAL; PERINEURAL ONCE
Status: COMPLETED | OUTPATIENT
Start: 2025-02-24 | End: 2025-02-24

## 2025-02-24 RX ADMIN — ALBUTEROL SULFATE 2 PUFF: 108 INHALANT RESPIRATORY (INHALATION) at 12:36

## 2025-02-24 RX ADMIN — CLOPIDOGREL BISULFATE 75 MG: 75 TABLET ORAL at 20:24

## 2025-02-24 RX ADMIN — ACETAMINOPHEN 975 MG: 325 TABLET, FILM COATED ORAL at 14:02

## 2025-02-24 RX ADMIN — ACETAMINOPHEN 975 MG: 325 TABLET, FILM COATED ORAL at 21:52

## 2025-02-24 RX ADMIN — MIDODRINE HYDROCHLORIDE 10 MG: 2.5 TABLET ORAL at 10:11

## 2025-02-24 RX ADMIN — Medication 5 MG: at 21:52

## 2025-02-24 RX ADMIN — CALCITRIOL CAPSULES 0.25 MCG 0.25 MCG: 0.25 CAPSULE ORAL at 21:52

## 2025-02-24 RX ADMIN — ALBUTEROL SULFATE 2.5 MG: 2.5 SOLUTION RESPIRATORY (INHALATION) at 13:28

## 2025-02-24 RX ADMIN — METOPROLOL SUCCINATE 12.5 MG: 25 TABLET, EXTENDED RELEASE ORAL at 20:23

## 2025-02-24 RX ADMIN — WARFARIN SODIUM 3 MG: 3 TABLET ORAL at 18:15

## 2025-02-24 RX ADMIN — CINACALCET 60 MG: 30 TABLET ORAL at 20:23

## 2025-02-24 RX ADMIN — ALLOPURINOL 100 MG: 100 TABLET ORAL at 10:12

## 2025-02-24 RX ADMIN — HEPARIN SODIUM 1600 UNITS/HR: 10000 INJECTION, SOLUTION INTRAVENOUS at 12:38

## 2025-02-24 RX ADMIN — SEVELAMER CARBONATE 800 MG: 800 TABLET, FILM COATED ORAL at 13:30

## 2025-02-24 RX ADMIN — Medication 1 CAPSULE: at 10:12

## 2025-02-24 RX ADMIN — ACETAMINOPHEN 975 MG: 325 TABLET, FILM COATED ORAL at 05:24

## 2025-02-24 RX ADMIN — MIDODRINE HYDROCHLORIDE 10 MG: 2.5 TABLET ORAL at 18:15

## 2025-02-24 RX ADMIN — ALBUTEROL SULFATE 2.5 MG: 2.5 SOLUTION RESPIRATORY (INHALATION) at 08:43

## 2025-02-24 RX ADMIN — ATORVASTATIN CALCIUM 40 MG: 40 TABLET, FILM COATED ORAL at 20:23

## 2025-02-24 RX ADMIN — SEVELAMER CARBONATE 800 MG: 800 TABLET, FILM COATED ORAL at 10:21

## 2025-02-24 RX ADMIN — METOPROLOL SUCCINATE 12.5 MG: 25 TABLET, EXTENDED RELEASE ORAL at 10:11

## 2025-02-24 RX ADMIN — PANTOPRAZOLE SODIUM 40 MG: 40 TABLET, DELAYED RELEASE ORAL at 10:12

## 2025-02-24 RX ADMIN — LIDOCAINE HYDROCHLORIDE ANHYDROUS 10 ML: 10 INJECTION, SOLUTION INFILTRATION at 14:30

## 2025-02-24 ASSESSMENT — ACTIVITIES OF DAILY LIVING (ADL)
ADLS_ACUITY_SCORE: 68

## 2025-02-24 NOTE — PROGRESS NOTES
Assessment and Plan:   ESRD: RCCA S/P NPTX. He had dialysis 2/22 Saturday. He had net UF of 1.5 L.     Date/Time Weight Weight Method   02/24/25 0541 74.5 kg (164 lb 3.9 oz) Bed scale   02/23/25 0636 73.6 kg (162 lb 4.1 oz) Bed scale   02/22/25 0540 80.6 kg (177 lb 11.1 oz) Bed scale   02/20/25 0600 76.4 kg (168 lb 6.9 oz) Bed scale   02/19/25 0706 83 kg (182 lb 15.7 oz)      /96, diastolic HT. P 105. UO yest 450 ml.     Labs today: Sodium 133 potassium 5.3 bicarbonate 22 creatinine 4.30.  Hemoglobin is 9.4.    Chest x-ray yesterday:  Moderate size right pleural effusion extending into the major fissure with patchy atelectasis of the right lung, unchanged. Small left pleural effusion also stable. Stable cardiomegaly. No new focal airspace opacity or pneumothorax. Right IJ dialysis catheter terminates in the proximal right atrium.    Plan dialysis tomorrow.  Orders are placed.    Consider thoracentesis to reduce dyspnea. Per IM. Would need to stop heparin for 4 h before.             Interval History:   Peripheral vascular disease.  Status post endarterectomy and PTFE bypass of the right lower extremity.  Complicated by shock and respiratory failure.       Diabetes:     Gout:       Atrial fibrillation: On Coumadin and metoprolol.                 Review of Systems:   No N or V. Up with lift  and assistance.           Medications:     Current Facility-Administered Medications   Medication Dose Route Frequency Provider Last Rate Last Admin    - MEDICATION INSTRUCTIONS for Dialysis Patients -   Does not apply See Admin Instructions Evelia Appiah PA-C        acetaminophen (TYLENOL) tablet 975 mg  975 mg Oral Q8H Evelia Appiah PA-C   975 mg at 02/24/25 0524    albuterol (PROVENTIL) neb solution 2.5 mg  2.5 mg Nebulization Q6H Elizabeth Slater MD   2.5 mg at 02/24/25 0843    allopurinol (ZYLOPRIM) tablet 100 mg  100 mg Oral Once per day on Monday Wednesday Friday Elizabeth Slater  MD   100 mg at 02/24/25 1012    [Held by provider] allopurinol (ZYLOPRIM) tablet 200 mg  200 mg Oral QPM Elizabeth Slater MD   200 mg at 01/26/25 1957    atorvastatin (LIPITOR) tablet 40 mg  40 mg Oral or Feeding Tube At Bedtime Evelia Appiah PA-C   40 mg at 02/23/25 2103    calcitRIOL (ROCALTROL) capsule 0.25 mcg  0.25 mcg Oral At Bedtime Evelia Appiah PA-C   0.25 mcg at 02/23/25 2103    cinacalcet (SENSIPAR) tablet 60 mg  60 mg Oral Q Mon Wed Fri AM Evelia Appiah PA-C   60 mg at 02/21/25 2200    clopidogrel (PLAVIX) tablet 75 mg  75 mg Oral or Feeding Tube QPM Evelia Appiah PA-C   75 mg at 02/23/25 2103    [Held by provider] insulin aspart (NovoLOG) injection (RAPID ACTING)   Subcutaneous TID w/meals Walter Dempsey MD   2 Units at 02/12/25 1700    insulin aspart (NovoLOG) injection (RAPID ACTING)  1-7 Units Subcutaneous TID AC Evelia Appiah PA-C   1 Units at 02/23/25 1220    insulin aspart (NovoLOG) injection (RAPID ACTING)  1-5 Units Subcutaneous At Bedtime Evelia Appiah PA-C   1 Units at 02/07/25 0040    [Held by provider] insulin glargine (LANTUS PEN) injection 10 Units  10 Units Subcutaneous Daily Ashley Roper MD        melatonin tablet 5 mg  5 mg Oral At Bedtime Evelia Appiah PA-C   5 mg at 02/23/25 2103    metoprolol succinate ER (TOPROL-XL) 24 hr half-tab 12.5 mg  12.5 mg Oral BID Evelia Appiah PA-C   12.5 mg at 02/24/25 1011    midodrine (PROAMATINE) tablet 10 mg  10 mg Oral BID w/meals Evelia Appiah PA-C   10 mg at 02/24/25 1011    multivitamin RENAL (TRIPHROCAPS) capsule 1 capsule  1 capsule Oral Daily Evelia Appiah PA-C   1 capsule at 02/24/25 1012    pantoprazole (PROTONIX) EC tablet 40 mg  40 mg Oral QAM AC Evelia Appiah PA-C   40 mg at 02/24/25 1012    polyethylene glycol (MIRALAX) Packet 17 g  17 g Oral Daily Evelia Appiah PA-C   17 g at 02/21/25 0840    senna-docusate (SENOKOT-S/PERICOLACE) 8.6-50 MG per tablet 1 tablet  1 tablet Oral BID  Evelia Appiah PA-C   1 tablet at 02/23/25 2107    sevelamer carbonate (RENVELA) tablet 800 mg  800 mg Oral TID w/meals Evelia Appiah PA-C   800 mg at 02/24/25 1021    sodium chloride (PF) 0.9% PF flush 3 mL  3 mL Intracatheter Q8H Evelia Appiah PA-C   3 mL at 02/23/25 2107    [Held by provider] warfarin ANTICOAGULANT (COUMADIN) tablet 2 mg  2 mg Oral ONCE at 18:00 Melissa Macias MD        Warfarin Dose Required Daily - Pharmacist Managed  1 each Oral See Admin Instructions Jersey Maki MD         Current Facility-Administered Medications   Medication Dose Route Frequency Provider Last Rate Last Admin    dextrose 10% infusion   Intravenous Continuous PRN Evelia Appiah PA-C        heparin 25,000 units in 0.45% NaCl 250 mL ANTICOAGULANT infusion  0-5,000 Units/hr Intravenous Continuous Melissa Macias MD 16 mL/hr at 02/24/25 0917 1,600 Units/hr at 02/24/25 0917    Patient is already receiving anticoagulation with heparin, enoxaparin (LOVENOX), warfarin (COUMADIN)  or other anticoagulant medication   Does not apply Continuous PRN Evelia Appiah PA-C         Current active medications and PTA medications reviewed, see medication list for details.            Physical Exam:   Vitals were reviewed  Patient Vitals for the past 24 hrs:   BP Temp Temp src Pulse Resp SpO2 Weight   02/24/25 0813 (!) 133/96 97.2  F (36.2  C) Oral 105 16 98 % --   02/24/25 0541 -- -- -- -- -- -- 74.5 kg (164 lb 3.9 oz)   02/24/25 0500 (!) 148/100 -- -- 114 16 95 % --   02/23/25 2100 (!) 136/98 97.3  F (36.3  C) Oral 110 16 94 % --   02/23/25 1548 (!) 129/97 97.5  F (36.4  C) Oral 107 14 100 % --   02/23/25 1532 -- -- -- -- -- 96 % --   02/23/25 1204 -- -- -- -- -- 98 % --       Temp:  [97.2  F (36.2  C)-97.5  F (36.4  C)] 97.2  F (36.2  C)  Pulse:  [105-114] 105  Resp:  [14-16] 16  BP: (129-148)/() 133/96  SpO2:  [94 %-100 %] 98 %    Temperatures:  Current - Temp: 97.2  F (36.2  C); Max - Temp   Av.3  F (36.3  C)  Min: 97.2  F (36.2  C)  Max: 97.5  F (36.4  C)  Respiration range: Resp  Avg: 15.5  Min: 14  Max: 16  Pulse range: Pulse  Av  Min: 105  Max: 114  Blood pressure range: Systolic (24hrs), Av , Min:129 , Max:148   ; Diastolic (24hrs), Av, Min:96, Max:100    Pulse oximetry range: SpO2  Av.8 %  Min: 94 %  Max: 100 %    I/O last 3 completed shifts:  In: 360 [P.O.:360]  Out: 350 [Urine:350]      Intake/Output Summary (Last 24 hours) at 2025 1048  Last data filed at 2025 1000  Gross per 24 hour   Intake 600 ml   Output 350 ml   Net 250 ml     Alert and responsive  Lungs with diminished BS on R, left clear  Cor RRR nl S1 S2 no M or rub  LE with dressings in place, no edema           Wt Readings from Last 4 Encounters:   25 74.5 kg (164 lb 3.9 oz)   25 75.8 kg (167 lb)   25 78.9 kg (174 lb)   25 78.9 kg (173 lb 14.4 oz)          Data:          Lab Results   Component Value Date     2025     2025     2025    Lab Results   Component Value Date    CHLORIDE 96 2025    CHLORIDE 96 2025    CHLORIDE 94 2025    Lab Results   Component Value Date    BUN 37.2 2025    BUN 23.2 2025    BUN 34.2 2025      Lab Results   Component Value Date    POTASSIUM 5.3 2025    POTASSIUM 4.6 2025    POTASSIUM 5.1 2025    Lab Results   Component Value Date    CO2 22 2025    CO2 23 2025    CO2 24 2025    Lab Results   Component Value Date    CR 4.30 2025    CR 3.20 2025    CR 4.16 2025        Recent Labs   Lab Test 25  0758 25  1228 25  0511   WBC 9.4 8.9 11.6*   HGB 9.4* 9.8* 9.8*   HCT 31.4* 31.6* 32.1*   MCV 95 95 96   * 432 468*     Recent Labs   Lab Test 25  1517 25  1852 25  1606   AST 12 12 17   ALT 6 8 14   ALKPHOS 115 101 246*   BILITOTAL 0.2 0.3 0.2       Recent Labs   Lab Test 25  0758  02/23/25  0511 02/22/25  0428   MAG 2.2 2.0 2.0     Recent Labs   Lab Test 02/24/25  0758 02/23/25  0511 02/22/25  0428   PHOS 4.3 3.4 3.9     Recent Labs   Lab Test 02/24/25  0758 02/23/25  0511 02/22/25  0428   TERENCE 9.5 8.9 9.0       Lab Results   Component Value Date    TERENCE 9.5 02/24/2025     Lab Results   Component Value Date    WBC 9.4 02/24/2025    HGB 9.4 (L) 02/24/2025    HCT 31.4 (L) 02/24/2025    MCV 95 02/24/2025     (H) 02/24/2025     Lab Results   Component Value Date     (L) 02/24/2025    POTASSIUM 5.3 02/24/2025    CHLORIDE 96 (L) 02/24/2025    CO2 22 02/24/2025     (H) 02/24/2025     Lab Results   Component Value Date    BUN 37.2 (H) 02/24/2025    CR 4.30 (H) 02/24/2025     Lab Results   Component Value Date    MAG 2.2 02/24/2025     Lab Results   Component Value Date    PHOS 4.3 02/24/2025       Creatinine   Date Value Ref Range Status   02/24/2025 4.30 (H) 0.67 - 1.17 mg/dL Final   02/23/2025 3.20 (H) 0.67 - 1.17 mg/dL Final   02/22/2025 4.16 (H) 0.67 - 1.17 mg/dL Final   02/21/2025 3.35 (H) 0.67 - 1.17 mg/dL Final   02/20/2025 4.33 (H) 0.67 - 1.17 mg/dL Final   02/19/2025 3.36 (H) 0.67 - 1.17 mg/dL Final       Attestation:  I have reviewed today's vital signs, notes, medications, labs and imaging.     Jimbo Foreman MD

## 2025-02-24 NOTE — PROGRESS NOTES
Dr. Romo paged via [employer] regarding pt's report of feeling short of breath with [id number]-77% on RA and O2 sats-98% on 3LO2 and pt's request for prn albuterol nebulization.    Addendum 1640:  Dr. Romo stated to have RT assess pt for possible suction or chest pt.  RT contacted by writer about this.

## 2025-02-24 NOTE — PROGRESS NOTES
Spoke with Dialysis RN who stated that pt is on a Tuesday, Thursday, Saturday Hemodialysis scheduled.  Dialysis RN stated that she spoke with Nephrologist about pt running today and it is uncertain if pt will run today and if so, will not be until much later today.

## 2025-02-24 NOTE — PROGRESS NOTES
Care Management Follow Up    Length of Stay (days): 34    Expected Discharge Date: 02/26/2025     Concerns to be Addressed: discharge planning     Patient plan of care discussed at interdisciplinary rounds: Yes    Anticipated Discharge Disposition: Skilled Nursing Facility     Anticipated Discharge Services:    Anticipated Discharge DME:      Patient/family educated on Medicare website which has current facility and service quality ratings:    Education Provided on the Discharge Plan:    Patient/Family in Agreement with the Plan: yes    Referrals Placed by CM/SW:    Private pay costs discussed: Not applicable    Discussed  Partnership in Safe Discharge Planning  document with patient/family: Yes    Handoff Completed: Yes, non-[facility] PCP: External handoff communication completed    Additional Information:  Writer checking for any TCU bed availability near patients chair time for dialysis in [place] for the month of March then transferring to [place] in April       Next Steps: Find either a TCU with dialysis or a TCU near patients upcoming community dialysis     All male beds near [place] have been full - patient does not want to return to [facility], that is the only option available but [facility] does not want to accept patient unless he is agreeable to coming there.       ADD - writer reached out to [facility] to see if they have any dialysis beds,  they will get back to me soon       KATELYN Duarte

## 2025-02-24 NOTE — PLAN OF CARE
Goal Outcome Evaluation:    Date & Time: 2/23/2025 [id number]   Summary: Admitted 1/21 for R leg pain and found to have R leg ulcer.   1/23 Diagnostic angiogram.   1/24 Thoracentesis.   1/27: Had R common femoral and popliteal tibial endarterectomy, R femoral to popliteal/ tibial PTFE bypass and admitted to ICU for mechanical ventilation.   2/2 Thoracentesis.   2/11 R transmetatarsal amputation.   2/12 tunneled dialysis catheter placed.  2/20 removal of peritoneal dialysis catheter.  Behavior & Aggression: Green.   Fall Risk: Yes.   Orientation: A&Ox4.  ABNL VS/O2:VSS on RA. Denied N/V.  ABNL Labs: see chart.  Pain Management: No Complaints this shift  Bowel/Bladder: Pt on hemodialysis, produces little urine, using urinal at bedside. passing gas per pt.  IV/Drains: PIV infusing heparin @ 1450units/hr - recheck Evening   Skin: RLE incision sites are C/D/I. R foot amputation JUAN CARLOS w/ ace wrap. Blanchable redness to coccyx, mepilex in place. R groin site w/ improving hematoma. Old peritoneal site is C/D/I. Doppler pulses intact on LLE  Diet: Regular.  Activity Level: not OOB, up w/ lift, Ax2w/ cares, T&R q2hrs as tolerated.  Tests/Procedures: N/A. Plan for another possible thoracentesis on Monday.  Anticipated  DC Date: TBD.

## 2025-02-24 NOTE — PROGRESS NOTES
Vascular Surgery Progress Note    Vascular following along peripherally, no changes from vascular standpoint. Arnulfo is ok to discharge from vascular surgery standpoint.     Cecilia Stearns, CNP

## 2025-02-24 NOTE — PROGRESS NOTES
[facility]    Medicine Progress Note - Hospitalist Service    Date of Admission:  1/21/2025    Assessment & Plan    Arnulfo Pritchett is a 64 year old male with past medical history of severe heart failure (EF 30%), ESRD (on peritoneal dialysis), chronic paroxysmal AF on warfarin, LLE PAD s/p endarterectomy, RCC s/p nephrectomy admitted on 1/21/2025 for septic shock secondary to right leg PAD with worsening right heel necrotic ulcer. The patient was seen in ED at outside hospital on 1/3/25 and diagnosed with pneumonia, later completing course of Cefdinir. He then presented on 1/21/25 to outside hospital for right leg pain and found to have right leg ulcer and transferred to [facility] for vascular surgery evaluation. Now s/p right common femoral BK popliteal/tibial endarterectomy and right common femoral to below-knee popliteal/tibial PTFE bypass performed on 1/27/25. The patient was admitted to the ICU for requirement of mechanical ventilation and pressor support following surgery for multifactorial shock.       Hospitalist service consulted 1/31/2025 for transfer out of ICU and to assist with medical management along with vascular surgery, podiatry and Infectious disease.     Hx PAD of LLE s/p endarterectomy and fem-tibioperoneal trunk bypass on 10/25/24  Hx RLE arterial occlusion s/p SFA angioplasty and stent 5/2024  PAD RLE, right heel gangrene, occluded SFA, no evidence of osteomyelitis  s/p right common femoral and popliteal/tibial endarterectomy, right femoral to popliteal/tibial PTFE bypass 1/27/25   # Right groin hematoma resolving   -Found to have critical limb ischemia on admission on January 21, for details see admission note.   -Distributive shock following surgery followed by the ICU service until 1/31.  -Followed by vascular surgery, podiatry, wound care, and ID.  -Coumadin has been held since admission, was was started on IV heparin drip since admission.  -Restarted warfarin on  2/14/2025 after discussion with vascular surgery.  INR now in goal range, heparin gtt discontinued.  -Has been maintained on statin and Plavix 75 mg daily during this hospital stay.  -Recent IV piperacillin/tazobactam (Zosyn), discontinued 2/2 per ID after 12 days of Zosyn; follow-up cultures, monitor off antibiotics.  -Wound care per surgery and WOC.  -pain control, see hospital orders.  -worsening ischemic changes in 2nd-5th toes of right foot.  -Underwent transmetatarsal amputation on 2/11/2025.  Tolerated procedure well.  Intraoperatively no signs of infection.  No further surgery per podiatry.  -Leukocytosis improved postsurgery.  Hemoglobin trended down to 7 on 2/13/2025 requiring transfusion, Hgb stable since then.   Multifactorial shock, resolved  ICU admission, weaned off vasopressors 1/30.  -Transitioned to midodrine with increase in prior to admission dose on1/31.  -Monitor vitals     End-stage renal disease (ESRD), on peritoneal dialysis  Hx of secondary hyperparathyroidism and hyperphosphatemia, phosphorous elevated above baseline of 6 at most recent of 7.9  Nephrology following.  -Chronic peritoneal dialysis for the last 3.5 years. Patient expressed interest to switch to HD. Nephrology discussed with his primary nephrologist who agrees to transition to hemodialysis.  Underwent right tunneled IJ catheter placement by IR on 2/12/2025.  Now undergoing HD on MWF schedule.  - CXR with loculated pleural effusion persistent SOB,IR guided thoracentesis ordered and pending  -Continue midodrine.  -Peritoneal dialysis catheter removed 12/19 by vascular surgery        acute hypoxemic respiratory failure, resolved  Right pleural effusion  Status post thoracentesis 1/24/25, repeat 2/2/25  -Following surgery on admission by vascular surgery was intubated.  Recent pneumonia treated with 3 weeks of Ceftin prior to this admission. Extubated 1/29/25  -Right pleural effusion.  Status post thoracentesis 1/24/25 with 1.5 L  clear yellow fluid removed, likely transudative based on low cell count of 117, , and protein of 1.7. Possibly secondary to reduced peritoneal dialysis during hospitalization vs underlying severe HFrEF (30%).  -CT chest 2/1/25, see report, no clear pneumonia; large right pleural effusion noted.  -Now off oxygen. Encourage incentive spirometry.  -Antibiotics as above, discontinued by ID 2/2  -ID consulted 2/1 as above, recommend monitoring patient off antibiotics  -S/p repeat Right thoracentesis on 2/2 with 2.3 L of rolan fluid removed; additional studies, cultures (negative to date), cytology (negative for malignancy)   -repeat Cxr showing pleural effusion with SOB hence IR guided thoracentesis ordered and pending on 2/24        Paroxysmal atrial fibrillation  Chronic anticoagulation prior to admission, warfarin - ON HOLD  Ischemic cardiomyopathy with HLD, CAD s/p multiple stents, and severe HFrEF (30%)   Troponin elevation, likely type 2 MI  Wide complex tachycardia 2/1/25  Assessment-postoperatively has been maintained on heparin drip.  Coumadin has been held during his hospital stay.  -Echo 1/31-EF 25 to 30%.  Severe global hypokinesis with abnormal septal motion consistent with conduction abnormality.  Severe inferior wall hypokinesis.  LVH.  RV mildly dilated and mildly reduced RV function.  Mild mitral regurgitation.  Mild to moderate mitral stenosis.  Moderate aortic stenosis.  Left ventricular fairly pressures elevated.  -Episode of wide-complex tachycardia noted by nursing staff, 15 beats, up on 2/1/2025.  -Continue inpatient telemetry.  -EP consulted on 2/16 given tachycardia with rates to 120s. They evaluated patient and said not unexpected given patient's severe HFrEF.              - Recommended increasing Toprol XL to 12.5 mg BID (PTA was on 25 mg daily)          # Confusion  # Metabolic encephalopathy resolved  -Patient refused MRI scan        Anemia of chronic disease  Baseline around 10.  Has  been stable in the 8 range. Did need 1 U PRBC on 2/8 and 2/13 for Hgb<7 without signs of active bleeding.  -Nephrology started patient on EPO on 2/9 and to continue weekly.  -Continue to monitor hemoglobin daily,         # Hyponatremia  Hyperkalemia  Continue hemodialysis        Hyperglycemia  Type 2 diabetes   hemoglobin A1c 5.7% October 24  PTA regimen: Lantus 35 units at bedtime  Insulin requirements have been decreasing and he started having hypoglycemia episodes after being started on HD on 2/13/2025  Discontinue Lantus and carb coverage  Continue sliding scale insulin. Discharge insulin dosing dependent on requirements from sliding scale.  Continue to monitor FSBS        Mixed anion gap acidosis, resolved  -Improved respiratory function, monitor.  Anion gap resolved 1/31.     Abdominal discomfort 1/30, resolved  Noted to have some right upper quadrant pain 1/30.  Now resolved.  LFTs normal.  Was on Zosyn for above surgical issues.   -Monitor abdominal exam.  -Antibiotics managed as noted above.     Moderate protein calorie nutrition  -Nutrition consulted.  -Speech and language therapy (SLP) consulted, diet per speech therapy.     Gout  -PTA allopurinol resolved and changed to dialysis dosing 100 mg 3 times weekly     History of renal cell carcinoma   # Patient had CTA done in in October 2024 which showed a 4 mm hypoenhancing nodule within the lower part of the left kidney which radiology said that might represent a small primary renal cell carcinoma.  Patient has been aware of this findings and did not follow-up with outpatient  CT abdomen pelvis done in July 2025 showed cyst in the left kidney  -Did not want workup with urology but today he said that he is open to seeing urology at [facility]  -Case management consulted        -Status post right nephrectomy, monitor.     History of obstructive sleep apnea.  By report, intolerant of CPAP   -Monitor, follow-up with outpatient provider.     Weakness and  deconditioning  -PT, OT, speech and language therapy (SLP) consulted.  -Increase activity as tolerated.     Disposition  Anticipated discharge timing pending thoracentesis and improved SOB  -Anticipated discharge to transitional care unit.  -Social work consulted for discharge planning.     # Goals of care were discussed with the patient on 2/20/25 and he does not want to be full code and does not want ribs to be broken or be on the ventilator and code status changed to DNR            Diet: Snacks/Supplements Adult: Ensure Enlive; Between Meals  Advance Diet as Tolerated: Regular Diet Adult; Regular Diet Adult    DVT Prophylaxis: IV heparin  Rosario Catheter: Not present  Lines: PRESENT      CVC Double Lumen Right Internal jugular Non - valved (open ended);Tunneled-Site Assessment: WDL      Cardiac Monitoring: None  Code Status: No CPR- Do NOT Intubate      Clinically Significant Risk Factors         # Hyponatremia: Lowest Na = 131 mmol/L in last 2 days, will monitor as appropriate  # Hypochloremia: Lowest Cl = 96 mmol/L in last 2 days, will monitor as appropriate      # Hypoalbuminemia: Lowest albumin = 1.6 g/dL at 2/12/2025  7:26 AM, will monitor as appropriate     # Hypertension: Noted on problem list  # Chronic heart failure with reduced ejection fraction: last echo with EF <40%          # DMII: A1C = 6.6 % (Ref range: <5.7 %) within past 6 months    # Severe Malnutrition: based on nutrition assessment    # Financial/Environmental Concerns:           Social Drivers of Health    Tobacco Use: Medium Risk (2/19/2025)    Patient History     Smoking Tobacco Use: Former     Smokeless Tobacco Use: Never   Alcohol Use: Unknown (11/28/2018)    [facility]    AUDIT-C     Frequency of Alcohol Consumption: Monthly or less     Frequency of Binge Drinking: Never   Transportation Needs: High Risk (1/21/2025)    Transportation Needs      Within the past 12 months, has lack of transportation kept you from medical appointments, getting your medicines, non-medical meetings or appointments, work, or from getting things that you need?: Yes   Physical Activity: Unknown (10/8/2024)    Exercise Vital Sign     Days of Exercise per Week: 0 days   Social Connections: Unknown (10/8/2024)    Social Connection and Isolation Panel [NHANES]     Frequency of Social Gatherings with Friends and Family: Patient declined          Disposition Plan     Medically Ready for Discharge: Anticipated in 2-4 Days             Dima Romo MD  Hospitalist Service  [facility]  Securely message with [employer] (more info)  Text page via [email or web address] Paging/Directory   ______________________________________________________________________    Interval History   Pt feeling SOB even at rest,appears restless discussed about thoracentesis pt is in agreement.    Physical Exam   Vital Signs: Temp: 97.2  F (36.2  C) Temp src: Oral BP: (!) 133/96 Pulse: 105   Resp: 16 SpO2: 96 % O2 Device: None (Room air)    Weight: 164 lbs 3.88 oz    Constitutional: Awake, alert, cooperative, moderate apparent distress  Respiratory: decreased BL basal BS,mild wheezing noted  Cardiovascular: Regular rate and rhythm, normal S1 and S2  GI: Normal bowel sounds, soft, non-distended, non-tender  Skin/Integumen: No rashes, no cyanosis, no edema

## 2025-02-24 NOTE — PLAN OF CARE
Date & Time: 1900-0730.  Summary: Admitted 1/21 for R leg pain and found to have R leg ulcer.   1/23 Diagnostic angiogram.   1/24 Thoracentesis.   1/27: Had R common femoral and popliteal tibial endarterectomy, R femoral to popliteal/ tibial PTFE bypass and admitted to ICU for mechanical ventilation.   2/2 Thoracentesis.   2/11 R transmetatarsal amputation.   2/12 tunneled dialysis catheter placed.  2/20 removal of peritoneal dialysis catheter.  Behavior & Aggression: Green.   Fall Risk: Yes.   Orientation: A&Ox4.  ABNL VS/O2:VSS on RA. Denied N/V.  ABNL Labs: see chart.  Pain Management: Scheduled Tylenol.  Bowel/Bladder: Pt on hemodialysis, produces little urine, using urinal at bedside. No BM this shift.  IV/Drains: PIV infusing heparin @ 1600 units/hr - recheck Hep Xa in am labs.  Skin: RLE incision sites are C/D/I. R foot amputation JUAN CARLOS w/ ace wrap. Blanchable redness to coccyx, mepilex in place. R groin site w/ improving hematoma. Old peritoneal site is C/D/I. Doppler pulses intact.  Diet: Regular.  Activity Level: not OOB, up w/ lift, Ax2w/ cares, T&R q2hrs as tolerated.  Tests/Procedures: N/A. Plan for another possible thoracentesis and Dialysis today.  Anticipated  DC Date: TBD.

## 2025-02-24 NOTE — PLAN OF CARE
Date & Time: 0700-1930  Summary: Admitted 1/21 for R leg pain and found to have R leg ulcer.   1/23 Diagnostic angiogram.   1/24 Thoracentesis.   1/27: Had R common femoral and popliteal tibial endarterectomy, R femoral to popliteal/ tibial PTFE bypass and admitted to ICU for mechanical ventilation.   2/2 Thoracentesis.   2/11 R transmetatarsal amputation.   2/12 tunneled dialysis catheter placed.  2/20 removal of peritoneal dialysis catheter.  Behavior & Aggression: Green.   Fall Risk: Yes.   Orientation: A&Ox4.  ABNL VS/O2: VSS except HTNsive, on 3LO2. Denied N/V.  ABNL Labs: see chart.  Pain Management: Scheduled Tylenol.  Bowel/Bladder: Pt on hemodialysis, produces little urine, using urinal at bedside. Had BM smear this shift.  IV/Drains: L and R PIV-SL  Skin: RLE incision sites are C/D/I. R foot amputation JUAN CARLOS w/ kerlix. Blanchable redness to coccyx, mepilex in place. R groin site w/ improving hematoma. Dressing to old peritoneal site-C/D/I. Doppler pulses intact.  Diet: Regular.  Activity Level: not OOB, up w/ lift, Ax2w/ cares, T&R q2hrs as tolerated.  Tests/Procedures: N/A. Had thoracentesis this shift.  To have Dialysis tomorrow.  Anticipated  DC Date: TBD.    Hep drip d/c'd this shift.  Received scheduled Coumadin this shift.  Reported shortness of breath this evening with [id number]% on RA.  Hospitalist notified and wants RT to see pt.  RT contacted by writers and RT to see pt this evening.  [id number]% on 3LO2.  Pt currently resting comfortably in bed.

## 2025-02-25 LAB
ANION GAP SERPL CALCULATED.3IONS-SCNC: 14 MMOL/L (ref 7–15)
BUN SERPL-MCNC: 45.3 MG/DL (ref 8–23)
CALCIUM SERPL-MCNC: 9 MG/DL (ref 8.8–10.4)
CHLORIDE SERPL-SCNC: 96 MMOL/L (ref 98–107)
CREAT SERPL-MCNC: 5.13 MG/DL (ref 0.67–1.17)
EGFRCR SERPLBLD CKD-EPI 2021: 12 ML/MIN/1.73M2
ERYTHROCYTE [DISTWIDTH] IN BLOOD BY AUTOMATED COUNT: 20.1 % (ref 10–15)
GLUCOSE BLDC GLUCOMTR-MCNC: 117 MG/DL (ref 70–99)
GLUCOSE BLDC GLUCOMTR-MCNC: 132 MG/DL (ref 70–99)
GLUCOSE BLDC GLUCOMTR-MCNC: 84 MG/DL (ref 70–99)
GLUCOSE BLDC GLUCOMTR-MCNC: 88 MG/DL (ref 70–99)
GLUCOSE SERPL-MCNC: 94 MG/DL (ref 70–99)
HCO3 SERPL-SCNC: 21 MMOL/L (ref 22–29)
HCT VFR BLD AUTO: 26.3 % (ref 40–53)
HGB BLD-MCNC: 8.2 G/DL (ref 13.3–17.7)
INR PPP: 3.21 (ref 0.85–1.15)
LACTATE SERPL-SCNC: 0.8 MMOL/L (ref 0.7–2)
MAGNESIUM SERPL-MCNC: 2.2 MG/DL (ref 1.7–2.3)
MCH RBC QN AUTO: 29.3 PG (ref 26.5–33)
MCHC RBC AUTO-ENTMCNC: 31.2 G/DL (ref 31.5–36.5)
MCV RBC AUTO: 94 FL (ref 78–100)
PHOSPHATE SERPL-MCNC: 5.7 MG/DL (ref 2.5–4.5)
PLATELET # BLD AUTO: 402 10E3/UL (ref 150–450)
POTASSIUM SERPL-SCNC: 6.3 MMOL/L (ref 3.4–5.3)
RBC # BLD AUTO: 2.8 10E6/UL (ref 4.4–5.9)
SODIUM SERPL-SCNC: 131 MMOL/L (ref 135–145)
WBC # BLD AUTO: 11.4 10E3/UL (ref 4–11)

## 2025-02-25 PROCEDURE — 99232 SBSQ HOSP IP/OBS MODERATE 35: CPT | Performed by: HOSPITALIST

## 2025-02-25 PROCEDURE — 84100 ASSAY OF PHOSPHORUS: CPT

## 2025-02-25 PROCEDURE — 94640 AIRWAY INHALATION TREATMENT: CPT | Mod: 76

## 2025-02-25 PROCEDURE — 250N000011 HC RX IP 250 OP 636: Performed by: INTERNAL MEDICINE

## 2025-02-25 PROCEDURE — 120N000001 HC R&B MED SURG/OB

## 2025-02-25 PROCEDURE — 83735 ASSAY OF MAGNESIUM: CPT

## 2025-02-25 PROCEDURE — 36415 COLL VENOUS BLD VENIPUNCTURE: CPT | Performed by: HOSPITALIST

## 2025-02-25 PROCEDURE — 250N000013 HC RX MED GY IP 250 OP 250 PS 637

## 2025-02-25 PROCEDURE — 36415 COLL VENOUS BLD VENIPUNCTURE: CPT | Performed by: STUDENT IN AN ORGANIZED HEALTH CARE EDUCATION/TRAINING PROGRAM

## 2025-02-25 PROCEDURE — 258N000003 HC RX IP 258 OP 636: Performed by: INTERNAL MEDICINE

## 2025-02-25 PROCEDURE — 82310 ASSAY OF CALCIUM: CPT

## 2025-02-25 PROCEDURE — 85014 HEMATOCRIT: CPT | Performed by: STUDENT IN AN ORGANIZED HEALTH CARE EDUCATION/TRAINING PROGRAM

## 2025-02-25 PROCEDURE — 250N000009 HC RX 250: Performed by: STUDENT IN AN ORGANIZED HEALTH CARE EDUCATION/TRAINING PROGRAM

## 2025-02-25 PROCEDURE — 99232 SBSQ HOSP IP/OBS MODERATE 35: CPT | Performed by: INTERNAL MEDICINE

## 2025-02-25 PROCEDURE — 83605 ASSAY OF LACTIC ACID: CPT | Performed by: HOSPITALIST

## 2025-02-25 PROCEDURE — 85610 PROTHROMBIN TIME: CPT

## 2025-02-25 PROCEDURE — 80048 BASIC METABOLIC PNL TOTAL CA: CPT

## 2025-02-25 PROCEDURE — 634N000001 HC RX 634: Mod: JZ | Performed by: INTERNAL MEDICINE

## 2025-02-25 PROCEDURE — 999N000157 HC STATISTIC RCP TIME EA 10 MIN

## 2025-02-25 PROCEDURE — 94640 AIRWAY INHALATION TREATMENT: CPT

## 2025-02-25 PROCEDURE — 90937 HEMODIALYSIS REPEATED EVAL: CPT

## 2025-02-25 RX ORDER — ALBUMIN (HUMAN) 12.5 G/50ML
50 SOLUTION INTRAVENOUS
Status: DISCONTINUED | OUTPATIENT
Start: 2025-02-25 | End: 2025-02-27

## 2025-02-25 RX ADMIN — ATORVASTATIN CALCIUM 40 MG: 40 TABLET, FILM COATED ORAL at 20:57

## 2025-02-25 RX ADMIN — Medication 5 MG: at 20:58

## 2025-02-25 RX ADMIN — SEVELAMER CARBONATE 800 MG: 800 TABLET, FILM COATED ORAL at 13:33

## 2025-02-25 RX ADMIN — SEVELAMER CARBONATE 800 MG: 800 TABLET, FILM COATED ORAL at 17:57

## 2025-02-25 RX ADMIN — HEPARIN SODIUM 1600 UNITS: 1000 INJECTION INTRAVENOUS; SUBCUTANEOUS at 11:24

## 2025-02-25 RX ADMIN — CLOPIDOGREL BISULFATE 75 MG: 75 TABLET ORAL at 20:57

## 2025-02-25 RX ADMIN — ALBUTEROL SULFATE 2.5 MG: 2.5 SOLUTION RESPIRATORY (INHALATION) at 19:35

## 2025-02-25 RX ADMIN — MIDODRINE HYDROCHLORIDE 10 MG: 2.5 TABLET ORAL at 07:57

## 2025-02-25 RX ADMIN — SODIUM CHLORIDE 500 ML: 9 INJECTION, SOLUTION INTRAVENOUS at 11:22

## 2025-02-25 RX ADMIN — HEPARIN SODIUM 1600 UNITS: 1000 INJECTION INTRAVENOUS; SUBCUTANEOUS at 11:26

## 2025-02-25 RX ADMIN — ALBUTEROL SULFATE 2.5 MG: 2.5 SOLUTION RESPIRATORY (INHALATION) at 13:06

## 2025-02-25 RX ADMIN — Medication 1 CAPSULE: at 13:33

## 2025-02-25 RX ADMIN — MIDODRINE HYDROCHLORIDE 10 MG: 2.5 TABLET ORAL at 17:57

## 2025-02-25 RX ADMIN — EPOETIN ALFA-EPBX 4000 UNITS: 4000 INJECTION, SOLUTION INTRAVENOUS; SUBCUTANEOUS at 11:23

## 2025-02-25 RX ADMIN — IRON SUCROSE 50 MG: 20 INJECTION, SOLUTION INTRAVENOUS at 11:22

## 2025-02-25 RX ADMIN — SEVELAMER CARBONATE 800 MG: 800 TABLET, FILM COATED ORAL at 07:58

## 2025-02-25 RX ADMIN — CALCITRIOL CAPSULES 0.25 MCG 0.25 MCG: 0.25 CAPSULE ORAL at 20:57

## 2025-02-25 RX ADMIN — ACETAMINOPHEN 975 MG: 325 TABLET, FILM COATED ORAL at 13:33

## 2025-02-25 RX ADMIN — ACETAMINOPHEN 975 MG: 325 TABLET, FILM COATED ORAL at 21:00

## 2025-02-25 RX ADMIN — PANTOPRAZOLE SODIUM 40 MG: 40 TABLET, DELAYED RELEASE ORAL at 07:57

## 2025-02-25 ASSESSMENT — ACTIVITIES OF DAILY LIVING (ADL)
ADLS_ACUITY_SCORE: 68
ADLS_ACUITY_SCORE: 66
ADLS_ACUITY_SCORE: 68
ADLS_ACUITY_SCORE: 62
ADLS_ACUITY_SCORE: 66
ADLS_ACUITY_SCORE: 68

## 2025-02-25 NOTE — PROGRESS NOTES
[facility]    Medicine Progress Note - Hospitalist Service    Date of Admission:  1/21/2025    Assessment & Plan    Arnulfo Pritchett is a 64 year old male with past medical history of severe heart failure (EF 30%), ESRD (on peritoneal dialysis), chronic paroxysmal AF on warfarin, LLE PAD s/p endarterectomy, RCC s/p nephrectomy admitted on 1/21/2025 for septic shock secondary to right leg PAD with worsening right heel necrotic ulcer. The patient was seen in ED at outside hospital on 1/3/25 and diagnosed with pneumonia, later completing course of Cefdinir. He then presented on 1/21/25 to outside hospital for right leg pain and found to have right leg ulcer and transferred to [facility] for vascular surgery evaluation. Now s/p right common femoral BK popliteal/tibial endarterectomy and right common femoral to below-knee popliteal/tibial PTFE bypass performed on 1/27/25. The patient was admitted to the ICU for requirement of mechanical ventilation and pressor support following surgery for multifactorial shock.       Hospitalist service consulted 1/31/2025 for transfer out of ICU and to assist with medical management along with vascular surgery, podiatry and Infectious disease.     Hx PAD of LLE s/p endarterectomy and fem-tibioperoneal trunk bypass on 10/25/24  Hx RLE arterial occlusion s/p SFA angioplasty and stent 5/2024  PAD RLE, right heel gangrene, occluded SFA, no evidence of osteomyelitis  s/p right common femoral and popliteal/tibial endarterectomy, right femoral to popliteal/tibial PTFE bypass 1/27/25   # Right groin hematoma resolving   -Found to have critical limb ischemia on admission on January 21, for details see admission note.   -Distributive shock following surgery followed by the ICU service until 1/31.  -Followed by vascular surgery, podiatry, wound care, and ID.  -Coumadin has been held since admission, was was started on IV heparin drip since admission.  -Restarted warfarin on  2/14/2025 after discussion with vascular surgery.   heparin gtt discontinued.  -Has been maintained on statin and Plavix 75 mg daily during this hospital stay.  -Recent IV piperacillin/tazobactam (Zosyn), discontinued 2/2 per ID after 12 days of Zosyn; follow-up cultures, monitor off antibiotics.  -Wound care per surgery and WOC.  -pain control, see hospital orders.  -worsening ischemic changes in 2nd-5th toes of right foot.  -Underwent transmetatarsal amputation on 2/11/2025.  Tolerated procedure well.  Intraoperatively no signs of infection.  No further surgery per podiatry.  -Leukocytosis improved postsurgery.  Hemoglobin trended down to 7 on 2/13/2025 requiring transfusion, Hgb stable since then.   Multifactorial shock, resolved  ICU admission, weaned off vasopressors 1/30.  -Transitioned to midodrine with increase in prior to admission dose on1/31.  -Monitor vitals  2/25/2025.  See vascular surgery note 2/24/2025 okay to discharge per vascular surgery.     End-stage renal disease (ESRD), on peritoneal dialysis  Hx of secondary hyperparathyroidism and hyperphosphatemia, phosphorous elevated above baseline of 6 at most recent of 7.9  Nephrology following.  -Chronic peritoneal dialysis for the last 3.5 years. Patient expressed interest to switch to HD. Nephrology discussed with his primary nephrologist who agrees to transition to hemodialysis.  Underwent right tunneled IJ catheter placement by IR on 2/12/2025.  Now undergoing HD on MWF schedule.  - CXR with loculated pleural effusion persistent SOB,IR guided thoracentesis ordered  -Continue midodrine.  -Peritoneal dialysis catheter removed 12/19 by vascular surgery  -2/25/2025.  1.7 L removed thoracentesis 2/24/2025.  Potassium 6.3 this morning, PD on low potassium bath today.  No fluid removed.  Recheck potassium tomorrow, PD on TTS schedule.  Fluid and electrolyte management per PD/nephrology.  Started on low potassium diet.  Telemetry for hyperkalemia         acute hypoxemic respiratory failure, resolved  Right pleural effusion  Status post thoracentesis 1/24/25, repeat 2/2/25  -Following surgery on admission by vascular surgery was intubated.  Recent pneumonia treated with 3 weeks of Ceftin prior to this admission. Extubated 1/29/25  -Right pleural effusion.  Status post thoracentesis 1/24/25 with 1.5 L clear yellow fluid removed, likely transudative based on low cell count of 117, , and protein of 1.7. Possibly secondary to reduced peritoneal dialysis during hospitalization vs underlying severe HFrEF (30%).  -CT chest 2/1/25, see report, no clear pneumonia; large right pleural effusion noted.  -Now off oxygen. Encourage incentive spirometry.  -Antibiotics as above, discontinued by ID 2/2  -ID consulted 2/1 as above, recommend monitoring patient off antibiotics  -S/p repeat Right thoracentesis on 2/2 with 2.3 L of rolan fluid removed; additional studies, cultures (negative to date), cytology (negative for malignancy)   -repeat Cxr showing pleural effusion with SOB hence IR guided    -2/25/2025.  1.7 L removed thoracentesis 2/24/2025.     Paroxysmal atrial fibrillation  Chronic anticoagulation prior to admission, warfarin - ON HOLD  Ischemic cardiomyopathy with HLD, CAD s/p multiple stents, and severe HFrEF (30%)   Troponin elevation, likely type 2 MI  Wide complex tachycardia 2/1/25  Assessment-postoperatively has been maintained on heparin drip.  Coumadin has been held during his hospital stay.  -Echo 1/31-EF 25 to 30%.  Severe global hypokinesis with abnormal septal motion consistent with conduction abnormality.  Severe inferior wall hypokinesis.  LVH.  RV mildly dilated and mildly reduced RV function.  Mild mitral regurgitation.  Mild to moderate mitral stenosis.  Moderate aortic stenosis.  Left ventricular fairly pressures elevated.  -Episode of wide-complex tachycardia noted by nursing staff, 15 beats, up on 2/1/2025.  -Continue inpatient telemetry.  -EP  consulted on 2/16 given tachycardia with rates to 120s. They evaluated patient and said not unexpected given patient's severe HFrEF.              - Recommended increasing Toprol XL to 12.5 mg BID (PTA was on 25 mg daily)          # Confusion  # Metabolic encephalopathy resolved  -Patient refused MRI scan        Anemia of chronic disease  Baseline around 10.  Has been stable in the 8 range. Did need 1 U PRBC on 2/8 and 2/13 for Hgb<7 without signs of active bleeding.  -Nephrology started patient on EPO on 2/9 and to continue weekly.  -Continue to monitor hemoglobin daily,         # Hyponatremia  Hyperkalemia  Continue hemodialysis        Hyperglycemia  Type 2 diabetes   hemoglobin A1c 5.7% October 24  PTA regimen: Lantus 35 units at bedtime  Insulin requirements have been decreasing and he started having hypoglycemia episodes after being started on HD on 2/13/2025  Discontinue Lantus and carb coverage  Continue sliding scale insulin. Discharge insulin dosing dependent on requirements from sliding scale.  Continue to monitor FSBS        Mixed anion gap acidosis, resolved  -Improved respiratory function, monitor.  Anion gap resolved 1/31.     Abdominal discomfort 1/30, resolved  Noted to have some right upper quadrant pain 1/30.  Now resolved.  LFTs normal.  Was on Zosyn for above surgical issues.   -Monitor abdominal exam.  -Antibiotics managed as noted above.     severe protein calorie nutrition in the context of acute on chronic illness or injury   -Nutrition consulted.  Nutrition supplement.  Low potassium.  -Speech and language therapy (SLP) consulted, diet per speech therapy.     Gout  -PTA allopurinol resolved and changed to dialysis dosing 100 mg 3 times weekly     History of renal cell carcinoma   # Patient had CTA done in in October 2024 which showed a 4 mm hypoenhancing nodule within the lower part of the left kidney which radiology said that might represent a small primary renal cell carcinoma.  Patient has  been aware of this findings and did not follow-up with outpatient  CT abdomen pelvis done in July 2025 showed cyst in the left kidney  -Did not want workup with urology but per prior hospitalist 2/20/2025 he said that he is open to seeing urology at [facility]  -Case management consulted        -Status post right nephrectomy, monitor.     History of obstructive sleep apnea.  By report, intolerant of CPAP   -Monitor, follow-up with outpatient provider.     Weakness and deconditioning  -PT, OT, speech and language therapy (SLP) consulted.  -Increase activity as tolerated.     Disposition  -Anticipated discharge to transitional care unit.  -Social work consulted for discharge planning.     # Goals of care were discussed with the patient and prior hospitalist on 2/20/25 and he does not want to be full code and does not want ribs to be broken or be on the ventilator and code status changed to DNR            Diet: Advance Diet as Tolerated: Regular Diet Adult; Regular Diet Adult; 2 gm K Diet    DVT Prophylaxis: IV heparin  Rosario Catheter: Not present  Lines: PRESENT      CVC Double Lumen Right Internal jugular Non - valved (open ended);Tunneled-Site Assessment: WDL except      Cardiac Monitoring: ACTIVE order. Indication: Electrolyte Imbalance (24 hours)- Magnesium <1.3 mg/ml; Potassium < =2.8 or > 5.5 mg/ml  Code Status: No CPR- Do NOT Intubate      Clinically Significant Risk Factors        # Hyperkalemia: Highest K = 6.3 mmol/L in last 2 days, will monitor as appropriate  # Hyponatremia: Lowest Na = 131 mmol/L in last 2 days, will monitor as appropriate  # Hypochloremia: Lowest Cl = 96 mmol/L in last 2 days, will monitor as appropriate      # Hypoalbuminemia: Lowest albumin = 1.6 g/dL at 2/12/2025  7:26 AM, will monitor as appropriate     # Hypertension: Noted on problem list    # Chronic heart failure with reduced ejection fraction: last echo with EF <40%          # DMII: A1C = 6.6 % (Ref range: <5.7 %) within past 6  months    # Severe Malnutrition: based on nutrition assessment      # Financial/Environmental Concerns:           Social Drivers of Health    Tobacco Use: Medium Risk (2/19/2025)    Patient History     Smoking Tobacco Use: Former     Smokeless Tobacco Use: Never   Alcohol Use: Unknown (11/28/2018)    [facility] from [facility], [facility] and [facility]    AUDIT-C     Frequency of Alcohol Consumption: Monthly or less     Frequency of Binge Drinking: Never   Transportation Needs: High Risk (1/21/2025)    Transportation Needs     Within the past 12 months, has lack of transportation kept you from medical appointments, getting your medicines, non-medical meetings or appointments, work, or from getting things that you need?: Yes   Physical Activity: Unknown (10/8/2024)    Exercise Vital Sign     Days of Exercise per Week: 0 days   Social Connections: Unknown (10/8/2024)    Social Connection and Isolation Panel [NHANES]     Frequency of Social Gatherings with Friends and Family: Patient declined          Disposition Plan     Medically Ready for Discharge: Anticipated in 2-4 Days TCU with dialysis available, repeat potassium tomorrow off PD and on low-sodium diet, next PD Thursday, 2/27/2025         Lynette Fox MD  Hospitalist Service  [facility]  Securely message with [employer] (more info)  Text page via [employer] Paging/Directory     Total time 35 minutes for today 2/25/2025 :   time consisted of the following, assuming Patient care with review of records including labs, imaging results, medications, interdisciplinary notes; examination of Patient;  and completing documentation and orders.  Care Management included counseling/discussion with Patient regarding current condition including hyperkalemia, PAD, discharge planning nephrology and Coordination of Care time with Nursing regarding hyperkalemia and PD and SW regarding discharge  planning and Specialists, nephrology regarding care plan, management and surveillance.   ______________________________________________________________________    Interval History   Assumed care.  Discussed discharge planning including TCU with dialysis capability.  Hyperkalemia and low potassium diet, PD schedule    Physical Exam   Vital Signs: Temp: 97.5  F (36.4  C) Temp src: Oral BP: 103/69 Pulse: 85   Resp: 18 SpO2: 98 % O2 Device: Nasal cannula Oxygen Delivery: 1 LPM  Weight: 162 lbs 14.72 oz    General/Constitutional:   NAD, alert, calm, cooperative, appears weak  Chest/Respiratory: Respirations nonlabored room air  Cardiovascular:  LE edema none  Gastrointestinal/Abdomen:  soft, nontender,   Neuro.  Gross motor tested, nonfocal,  Psych affect calm

## 2025-02-25 NOTE — PLAN OF CARE
Shift: [id number]    1/27: POD 29 from a R fem pop bypass & endarterectomy.  2/11: POD 14 from a R transmetatarsal amputation   2/19: removed peritoneal dialysis catheter  2/2 & 2/24: Thoracentesis     Orientation: AOX4, flat  Vital Signs: VSS, 1L O2  Labs: K+ was 6.3, corrected in dialysis. WBC 11.4. Creat 5.13. Mag 2.2   Pain Management: Denies  Bowel: BS audible, no BM. Declined stool softeners.   Bladder: Urinal at bedside. No void this shift. Bladder scan for 9mL.   IV: Saline locked  Wounds/Incisions: R groin incision is  partially open, red, and has purulent discharge, cleansed with wound cleanser and covered with island dressing. R calf tegaderm, R heel dressing change completed PPOC, R transmetatarsal amp, abd dressing from old dialysis site  Diet: Low K+ diet  Activity Level: Assist of 2 w/ lift. T/R (pt refuses)  Tele: Placed on tele today for high K+. NSR.   Education: Repositioning  Anticipated Discharge Date/Plan: TCU   Significant Information: Pt off the floor from [id number] for dialysis.

## 2025-02-25 NOTE — PROGRESS NOTES
"Care Management Follow Up    Length of Stay (days): 35    Expected Discharge Date: 02/27/2025     Concerns to be Addressed: discharge planning     Patient plan of care discussed at interdisciplinary rounds: Yes    Anticipated Discharge Disposition: Skilled Nursing Facility    Anticipated Discharge Services:  outpatient hemodialysis  Anticipated Discharge DME:      Patient/family educated on Medicare website which has current facility and service quality ratings:    Education Provided on the Discharge Plan:    Patient/Family in Agreement with the Plan: yes    Referrals Placed by CM/SW:    Private pay costs discussed: Not applicable    Discussed  Partnership in Safe Discharge Planning  document with patient/family: No     Handoff Completed: No, handoff not indicated or clinically appropriate    Additional Information:  Writer coordinated outpatient follow up with [facility] Urologist Dr. Dumont:    \" Follow up with a renal Ultrasound as previously ordered by Dr. Dumont from [facility].   An appointment has been scheduled for Thursday, March 20th at 1015 at [facility] at [address] [place], [place]   The Phone number of the clinic is [phone]. \"    Next Steps: await medical readiness for discharge.     Calli Hyman RN   [facility]   Phone [phone], [place] or [phone]      "

## 2025-02-25 NOTE — PROGRESS NOTES
CLINICAL NUTRITION SERVICES - REASSESSMENT NOTE     Malnutrition Status:  2/13  Severe malnutrition in the context of acute on chronic illness or injury     Registered Dietitian Interventions:  - Discontinue Ensure for now - piling up in room. Seems to be eating better the past week.      SUBJECTIVE INFORMATION  Patient not available for interview due to - out at HD    CURRENT NUTRITION ORDERS  Diet: 2 g Potassium  + Ensure @ 10 am   + Triphrocaps     CURRENT INTAKE/TOLERANCE  - Pt out of room for dialysis during visit.   - Noted several Ensure bottles piling up on tray table.     - Ate 25% of a large breakfast this morning. Since last visit on 2/18 it does look like pt is more consistently eating 100% of his trays.   - Orders adequate meals BID-TID.      NEW FINDINGS  Weight: 73.9 kg - trending down. May be closer to dry weight.   Wt Readings from Last 20 Encounters:   02/25/25 73.9 kg (162 lb 14.7 oz)   01/21/25 75.8 kg (167 lb)   01/03/25 78.9 kg (174 lb)   01/02/25 78.9 kg (173 lb 14.4 oz)   12/27/24 78.9 kg (173 lb 14.4 oz)   12/12/24 78.8 kg (173 lb 12.8 oz)   12/06/24 79.7 kg (175 lb 11.3 oz)   12/05/24 79.4 kg (175 lb 1.6 oz)   12/03/24 80 kg (176 lb 5.9 oz)   11/26/24 76.2 kg (168 lb)     Skin/wounds: foot wound - stable per vascular   GI symptoms: Last BM recorded x1 on 2/23  Nutrition-relevant labs:   Na 131 (L)  K 6.3 (critical high)  BUN 45.3 (H), Cr 5.13 (H), GFR 12 (L)  Phos 5.7 (H)  Nutrition-relevant medications:   Bowel meds available, held today for pt refusal     2/24 - Thoracentesis - 1.7 liters of clear fluid were removed   2/25 - HD today     ASSESSED NUTRITION NEEDS  Dosing Weight: 76.2 kg, based on actual wt  Estimated Energy Needs: 2362-0633 kcals/day (25 - 30 kcals/kg)  Justification: Maintenance  Estimated Protein Needs: 115-135 grams protein/day (1.5 - 1.8 grams of pro/kg)  Justification:  PD and Hypercatabolism with acute illness    EVALUATION OF THE PROGRESS TOWARD GOALS   Previous  Goals  Patient to consume at least 50% of nutritionally adequate meal trays TID, or the equivalent with supplements/snacks.   Evaluation: Progressing    Previous Nutrition Diagnosis  Inadequate oral intake related to early satiety, low appetite, increased needs for HD, healing as evidenced by intakes of 25-50% of meals TID.   Evaluation: Improving    NUTRITION DIAGNOSIS  Increased nutrient needs (protein)  related to plan of care as evidenced by dialysis.     INTERVENTIONS  See nutrition interventions above    Goals  Patient to consume % of nutritionally adequate meal trays TID, or the equivalent with supplements/snacks.     Monitoring/Evaluation      Progress toward goals will be monitored and evaluated per policy.    Allison Garcia RD, LD  Pager: [phone]  Available on [employer]

## 2025-02-25 NOTE — PROGRESS NOTES
Potassium   Date Value Ref Range Status   02/25/2025 6.3 (HH) 3.4 - 5.3 mmol/L Final     Hemoglobin   Date Value Ref Range Status   02/25/2025 8.2 (L) 13.3 - 17.7 g/dL Final     Creatinine   Date Value Ref Range Status   02/25/2025 5.13 (H) 0.67 - 1.17 mg/dL Final     Urea Nitrogen   Date Value Ref Range Status   02/25/2025 45.3 (H) 8.0 - 23.0 mg/dL Final     Sodium   Date Value Ref Range Status   02/25/2025 131 (L) 135 - 145 mmol/L Final     INR   Date Value Ref Range Status   02/25/2025 3.21 (H) 0.85 - 1.15 Final     INR (External)   Date Value Ref Range Status   01/14/2025 4.7 (A) 0.9 - 1.1 Final     DIALYSIS PROCEDURE NOTE  Hepatitis status of previous patient on machine log was checked and verified ok to use with this patients hepatitis status.  Patient dialyzed for 3 hrs. on a K2 bath with a net fluid removal of  0L.  A BFR of 400 ml/min was obtained via a right CVC.  The treatment plan was discussed with Dr. Minor during the treatment.    Total heparin received during the treatment: 0 units.     Line flushed, clamped and capped with heparin 1:1000 1.6 mL (1600 units) per lumen    Meds  given: Epoetin, Venofer   Complications: Decrease in BP      Person educated: patient. Knowledge base substantial. Barriers to learning: none. Educated on procedure via verbal mode. Patient verbalized understanding.   ICEBOAT? Timeout performed pre-treatment  I: Patient was identified using 2 identifiers  C:  Consent Signed Yes  E: Equipment preventative maintenance is current and dialysis delivery system OK to use  B: Hepatitis B Surface Antigen: Negative; Draw Date: 02/20/2025      Hepatitis B Surface Antibody: Susceptible; Draw Date: 02/20/2025  O: Dialysis orders present and complete prior to treatment  A: Vascular access verified and assessed prior to treatment  T: Treatment was performed at a clinically appropriate time  ?: Patient was allowed to ask questions and address concerns prior to treatment  See Adult  Hemodialysis flowsheet in EPIC for further details and post assessment.  Machine water alarm in place and functioning. Transducer pods intact and checked every 15min.   Pt assisted with repositioning throughout dialysis treatment.  Pt returned via bed.  Chlorine/Chloramine water system checked every 4 hours.  Outpatient Dialysis at [facility].       Post treatment report given to TRIXIE Flynn RN regarding 0L of fluid removed, last /60.      TRIXIE Red RN

## 2025-02-25 NOTE — PLAN OF CARE
Goal Outcome Evaluation:  Problem: Adult Inpatient Plan of Care  Goal: Absence of Hospital-Acquired Illness or Injury  2/25/2025 0204 by Evelia Hernandez, RN  Outcome: Progressing  2/24/2025 2239 by Evelia Hernandez, RN  Outcome: Progressing  Intervention: Prevent Skin Injury  Recent Flowsheet Documentation  Taken 2/25/2025 0143 by Evelia Hernandez RN  Body Position: (boosted pt up, pt ref repo)   refuses positioning   weight shifting  Taken 2/24/2025 2152 by Evelia Hernandez RN  Body Position: weight shifting  Taken 2/24/2025 2017 by Evelia Hernandez RN  Body Position: refuses positioning    Shift: 7p-7a  Surgery/POD#: Admitted 1/21 for septic shock due to RLE PAD & worsening R heel necrotic ulcer.  1/27: POD 29 from a R fem pop bypass & endarterectomy.  2/11: POD 14 from a R transmetatarsal amputation   2/19: removed peritoneal dialysis catheter  Thoracentesis on 2/2 & 2/24 2/24: hep gtt stopped  Behavior & Aggression: Green  Fall Risk: Yes  Orientation: A&O x4  ABNL VS/O2: VSS, ex 1L NC for patient comfort  Tele: NA  ABNL Labs: Mg replacement protocol - recheck this AM; Cr 4.3, Hgb 9.4  Pain Management: denies pain  Bowel/Bladder: urinal at bedside/hemodialysis patient, bowel sounds normoactive, passing flatus, no BM overnight  Drains: NA  Lines: PIV x2 SL'd, R CVC hemodialysis   Skin: R groin NICO, R calf tegaderm, R foot kerlix, abd dressing CDI (old dialysis site)  Diet: Regular, denies N/V - poor intake per pt  Activity Level: x2 lift, T/R q2 - but pt refusing  Tests/Procedures: dialysis T/Th/Sat  Anticipated  DC Date: pending, needs TCU placement & dialysis   Significant Information:   PT/OT, SW, hospitalist, nephrology, vascular following.

## 2025-02-25 NOTE — PROGRESS NOTES
Inpatient Dialysis Progress Note            Assessment and Plan:     1) End stage renal disease on PD  Chronic PD for last 3.5 years.  Home unit [facility], nephrologist Dr. May  Rx: 5 cycles, 2 L fill volumes, 9 hours, last fill 1.2 L with Extraneal.       Transitioned to iHD 02.15.25    PD cath removed 2/19/2025.     Working on placement.  He has HD placement in [place] for March and then [place] starting in April.       2) Moderate R pleural effusion: Status post thoracentesis on 1/24, 2/2 and 2/24.   Transudative  Chest x-ray on 2/5 shows small layering effusion on right side     3) PAD, right heel gangrene, occluded right SFA.  Status post rt fem - below knee popliteal bypass this admission     4) Postsurgical anemia in patient with ESRD- HGB 8.  EPO ordered.       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  Ca 9.0.   He is on calcitriol 0.25 mcg daily.  Cinacalcet 60 mg MWF.     Plan:    BP does not allow any fluid removal today.  K 2 bath for K 6.3  EPO for anemia  Change to low K diet  Next run Thursday        Interval History:      Appetite fair.  SOB better after thoracentesis yesterday - 1.7 liters.          Dialysis Parameters:     Wt Readings from Last 4 Encounters:   02/25/25 73.9 kg (162 lb 14.7 oz)   01/21/25 75.8 kg (167 lb)   01/03/25 78.9 kg (174 lb)   01/02/25 78.9 kg (173 lb 14.4 oz)     I/O last 3 completed shifts:  In: 486 [P.O.:480; I.V.:6]  Out: 25 [Urine:25]  BP Readings from Last 3 Encounters:   02/25/25 125/86   01/21/25 (!) 125/97   01/06/25 124/88       Routine, ONE TIME, Starting today For 1 Occurrences  Weight Loss (kg): 0  Dialysis Temp: 36.5  C  Access Device: CVC  Access Site: R IJ  Dialyzer: Revaclear  Dialysis Bath: K 2  Blood Flow Rate (mL/min): 400  Total Treatment Time (hrs): 3  Heparin: no         Medications and Allergies:   Reviewed in EPIC    Current Facility-Administered Medications   Medication Dose Route Frequency Provider Last Rate Last Admin    - MEDICATION INSTRUCTIONS for  Dialysis Patients -   Does not apply See Admin Instructions Evelia Appiah PA-C        acetaminophen (TYLENOL) tablet 975 mg  975 mg Oral Q8H Evelia Appiah PA-C   975 mg at 02/24/25 2152    albuterol (PROVENTIL) neb solution 2.5 mg  2.5 mg Nebulization Q6H Elizabeth Slater MD   2.5 mg at 02/24/25 1328    allopurinol (ZYLOPRIM) tablet 100 mg  100 mg Oral Once per day on Monday Wednesday Friday Elizabeth Slater MD   100 mg at 02/24/25 1012    [Held by provider] allopurinol (ZYLOPRIM) tablet 200 mg  200 mg Oral QPM Elizabeth Slater MD   200 mg at 01/26/25 1957    atorvastatin (LIPITOR) tablet 40 mg  40 mg Oral or Feeding Tube At Bedtime Evelia Appiah PA-C   40 mg at 02/24/25 2023    calcitRIOL (ROCALTROL) capsule 0.25 mcg  0.25 mcg Oral At Bedtime Evelia Appiah PA-C   0.25 mcg at 02/24/25 2152    cinacalcet (SENSIPAR) tablet 60 mg  60 mg Oral Q Mon Wed Fri AM Evelia Appiah PA-C   60 mg at 02/24/25 2023    clopidogrel (PLAVIX) tablet 75 mg  75 mg Oral or Feeding Tube QPM Evelia Appiah PA-C   75 mg at 02/24/25 2024    epoetin evaristo-epbx (RETACRIT) injection 4,000 Units  4,000 Units Intravenous Once in dialysis/CRRT Jimbo Foreman MD        sodium chloride 0.9% DIALYSIS Cath LOCK - RED Lumen  10 mL Intracatheter Once in dialysis/CRRT Jimbo Foreman MD        Followed by    heparin 1000 unit/mL DIALYSIS Cath LOCK - RED Lumen  1.3-2.6 mL Intracatheter Once in dialysis/CRRT Jimbo Foreman MD        sodium chloride 0.9% DIALYSIS Cath LOCK - BLUE Lumen  10 mL Intracatheter Once in dialysis/CRRT Jimbo Foreman MD        Followed by    heparin 1000 unit/mL DIALYSIS Cath LOCK -BLUE Lumen  1.3-2.6 mL Intracatheter Once in dialysis/CRRT Jimbo Foreman MD        [Held by provider] insulin aspart (NovoLOG) injection (RAPID ACTING)   Subcutaneous TID w/meals Walter Dempsey MD   2 Units at 02/12/25 1700    insulin aspart (NovoLOG)  injection (RAPID ACTING)  1-7 Units Subcutaneous TID AC Evelia Appiah PA-C   1 Units at 02/24/25 1724    insulin aspart (NovoLOG) injection (RAPID ACTING)  1-5 Units Subcutaneous At Bedtime Evelia Appiah PA-C   1 Units at 02/07/25 0040    [Held by provider] insulin glargine (LANTUS PEN) injection 10 Units  10 Units Subcutaneous Daily Ashley Roper MD        iron sucrose (VENOFER) injection 50 mg  50 mg Intravenous Once in dialysis/CRRT Jimbo Foreman MD        melatonin tablet 5 mg  5 mg Oral At Bedtime Evelia Appiah PA-C   5 mg at 02/24/25 2152    metoprolol succinate ER (TOPROL-XL) 24 hr half-tab 12.5 mg  12.5 mg Oral BID Evelia Appiah PA-C   12.5 mg at 02/24/25 2023    midodrine (PROAMATINE) tablet 10 mg  10 mg Oral BID w/meals Evelia Appiah PA-C   10 mg at 02/25/25 0757    multivitamin RENAL (TRIPHROCAPS) capsule 1 capsule  1 capsule Oral Daily Evelia Appiah PA-C   1 capsule at 02/24/25 1012    No heparin via hemodialysis machine   Does not apply Once Jimbo Foreman MD        pantoprazole (PROTONIX) EC tablet 40 mg  40 mg Oral QAM AC Evelia Appiah PA-C   40 mg at 02/25/25 0757    polyethylene glycol (MIRALAX) Packet 17 g  17 g Oral Daily Evelia Appiah PA-C   17 g at 02/21/25 0840    senna-docusate (SENOKOT-S/PERICOLACE) 8.6-50 MG per tablet 1 tablet  1 tablet Oral BID Evelia Appiah PA-C   1 tablet at 02/23/25 2107    sevelamer carbonate (RENVELA) tablet 800 mg  800 mg Oral TID w/meals Evelia Appiah PA-C   800 mg at 02/25/25 0758    sodium chloride (PF) 0.9% PF flush 3 mL  3 mL Intracatheter Q8H Evelia Appiah PA-C   3 mL at 02/24/25 2022    sodium chloride (PF) 0.9% PF flush 9 mL  9 mL Intracatheter During Dialysis/CRRT (from stock) Jimbo Foreman MD        sodium chloride (PF) 0.9% PF flush 9 mL  9 mL Intracatheter During Dialysis/CRRT (from stock) Jimbo Foreman MD        sodium chloride 0.9% BOLUS 200 mL  200 mL Hemodialysis Machine Once  Jimbo Foreman MD        sodium chloride 0.9% BOLUS 250 mL  250 mL Intravenous Once in dialysis/CRRT Jimbo Foreman MD        sodium chloride 0.9% BOLUS 500 mL  500 mL Hemodialysis Machine Once Jimbo Foreman MD        Warfarin Dose Required Daily - Pharmacist Managed  1 each Oral See Admin Instructions Jersey Maki MD         Current Facility-Administered Medications   Medication Dose Route Frequency Provider Last Rate Last Admin    acetaminophen (TYLENOL) tablet 650 mg  650 mg Oral or NG Tube Q4H PRN Evelia Appiah PA-C        Or    acetaminophen (TYLENOL) oral liquid 650 mg  650 mg Per NG tube Q4H PRN Evelia Appiah PA-C        acetaminophen (TYLENOL) Suppository 650 mg  650 mg Rectal Q4H PRN Evelia Appiah PA-C        albumin human 25 % injection 50 mL  50 mL Intravenous Q1H PRN Jimbo Foreman MD        albuterol (PROVENTIL HFA/VENTOLIN HFA) inhaler  2 puff Inhalation Q6H PRN Evelia Appiah PA-C   2 puff at 02/24/25 1236    alteplase (CATHFLO ACTIVASE) injection 2 mg  2 mg Intracatheter Q1H PRN Jimbo Foreman MD        alteplase (CATHFLO ACTIVASE) injection 2 mg  2 mg Intracatheter Q1H PRN Jimbo Foreman MD        benzocaine-menthol (CHLORASEPTIC) 6-10 MG lozenge 1-2 lozenge  1-2 lozenge Buccal Q1H PRN Evelia Appiah PA-ORI        bisacodyl (DULCOLAX) suppository 10 mg  10 mg Rectal Daily PRN Evelia Appiah PA-C        calcium carbonate (TUMS) chewable tablet 1,000 mg  1,000 mg Oral 4x Daily PRN Evelia Appiah PA-C        dextrose 10% infusion   Intravenous Continuous PRN Evelia Appiah PA-C        glucose gel 15-30 g  15-30 g Oral Q15 Min PRN Evelia Appiah PA-C   30 g at 02/02/25 1753    Or    dextrose 50 % injection 25-50 mL  25-50 mL Intravenous Q15 Min PRN Evelia Appiah PA-C   25 mL at 02/13/25 2341    Or    glucagon injection 1 mg  1 mg Subcutaneous Q15 Min PRN Evelia Appiah PA-C        gentamicin (GARAMYCIN) 0.1 % cream    Topical Daily PRN Evelia Appiah PA-C   Given at 02/08/25 2203    HYDROmorphone (DILAUDID) injection 0.1 mg  0.1 mg Intravenous Q4H PRN Evelia Appiah PA-C        Or    HYDROmorphone (DILAUDID) injection 0.2 mg  0.2 mg Intravenous Q4H PRN Evelia Appiah PA-C        insulin aspart (NovoLOG) injection (RAPID ACTING)   Subcutaneous With Snacks or Supplements Evelia Appiah PA-C        lidocaine (LMX4) cream   Topical Q1H PRN Evelia Appiah PA-C        lidocaine 1 % 0.1-1 mL  0.1-1 mL Other Q1H PRN Evelia Appiah PA-C        magnesium hydroxide (MILK OF MAGNESIA) suspension 30 mL  30 mL Oral Daily PRN Evelia Appiah PA-C        methocarbamol (ROBAXIN) tablet 500 mg  500 mg Oral or NG Tube TID PRN Evelia Appiah PA-C   500 mg at 02/18/25 0148    midodrine (PROAMATINE) tablet 2.5 mg  2.5 mg Oral Once PRN Evelia Appiah PA-C        naloxone (NARCAN) injection 0.2 mg  0.2 mg Intravenous Q2 Min PRN Evelia Appiah PA-C        Or    naloxone (NARCAN) injection 0.4 mg  0.4 mg Intravenous Q2 Min PRN Evelia Appiah PA-C        Or    naloxone (NARCAN) injection 0.2 mg  0.2 mg Intramuscular Q2 Min PRN Evelia Appiah PA-C        Or    naloxone (NARCAN) injection 0.4 mg  0.4 mg Intramuscular Q2 Min PRN Evelia Appiah PA-C        ondansetron (ZOFRAN ODT) ODT tab 4 mg  4 mg Oral Q6H PRN Evelia Appiah PA-C   4 mg at 01/24/25 0903    Or    ondansetron (ZOFRAN) injection 4 mg  4 mg Intravenous Q6H PRN Evelia Appiah PA-C   4 mg at 02/18/25 1738    oxyCODONE IR (ROXICODONE) half-tab 2.5 mg  2.5 mg Oral Q4H PRN Evelia Appiah PA-C        Or    oxyCODONE (ROXICODONE) tablet 5 mg  5 mg Oral Q4H PRN Evelia Appiah PA-C        Patient is already receiving anticoagulation with heparin, enoxaparin (LOVENOX), warfarin (COUMADIN)  or other anticoagulant medication   Does not apply Continuous PRN Evelia Appiah PA-C        polyethylene glycol (MIRALAX) Packet 17 g  17 g Oral Daily PRN Evelia Apipah PA-C   17 g  at 01/31/25 1355    senna-docusate (SENOKOT-S/PERICOLACE) 8.6-50 MG per tablet 1 tablet  1 tablet Oral BID PRN Evelia Appiah PA-C   1 tablet at 01/31/25 1355    Or    senna-docusate (SENOKOT-S/PERICOLACE) 8.6-50 MG per tablet 2 tablet  2 tablet Oral BID PRN Evelia Appiah PA-C        sodium chloride (PF) 0.9% PF flush 10 mL  10 mL Intracatheter Q15 Min PRN Jimbo Foreman MD        sodium chloride (PF) 0.9% PF flush 10 mL  10 mL Intracatheter Q15 Min PRN Jimbo Foreman MD        sodium chloride (PF) 0.9% PF flush 3 mL  3 mL Intracatheter q1 min prn Evelia Appiah PA-C   3 mL at 02/24/25 2021    sodium chloride 0.9% BOLUS 100-150 mL  100-150 mL Intravenous Q15 Min PRN Jimbo Foreman MD          No Known Allergies           Labs:     [facility]  Recent Labs   Lab 02/25/25  0642 02/25/25  0146 02/24/25  2100 02/24/25  1700 02/24/25  0812 02/24/25  0758 02/23/25  1120 02/23/25  0511 02/22/25  1344 02/22/25  0428   *  --   --   --   --  133*  --  131*  --  132*   POTASSIUM 6.3*  --   --   --   --  5.3  --  4.6  --  5.1   CHLORIDE 96*  --   --   --   --  96*  --  96*  --  94*   TERENCE 9.0  --   --   --   --  9.5  --  8.9  --  9.0   CO2 21*  --   --   --   --  22  --  23  --  24   BUN 45.3*  --   --   --   --  37.2*  --  23.2*  --  34.2*   CR 5.13*  --   --   --   --  4.30*  --  3.20*  --  4.16*   GLC 94 88 91 145*   < > 136*   < > 119*   < > 123*    < > = values in this interval not displayed.     CBC  Recent Labs   Lab 02/25/25  0642 02/24/25  0758 02/23/25  1228 02/23/25  0511   WBC 11.4* 9.4 8.9 11.6*   HGB 8.2* 9.4* 9.8* 9.8*   HCT 26.3* 31.4* 31.6* 32.1*   MCV 94 95 95 96    471* 432 468*     Lab Results   Component Value Date    AST 12 01/30/2025    ALT 6 01/30/2025    ALKPHOS 115 01/30/2025    BILITOTAL 0.2 01/30/2025            Physical Exam:   Vitals were reviewed in EPIC    Wt Readings from Last 3 Encounters:   02/25/25 73.9 kg (162 lb 14.7 oz)   01/21/25 75.8 kg (167 lb)    01/03/25 78.9 kg (174 lb)       Intake/Output Summary (Last 24 hours) at 2/25/2025 0904  Last data filed at 2/25/2025 0800  Gross per 24 hour   Intake 726 ml   Output 25 ml   Net 701 ml       GENERAL APPEARANCE: pleasant, no distress, a & o  HEENT:  Eyes/ears/nose grossly normal, neck supple  RESP: lungs clear to auscultation but shallow breaths.    CV: regular rate and rhythm, normal S1 S2, no murmur, click or rub   ABDOMEN: soft, nontender, bowel sounds normal  EXTREMITIES/SKIN: no edema, no rashes or lesions       Attestation:  I have reviewed today's vital signs, notes, medications, labs and imaging.     Harish Minor MD  [facility] - Nephrology  [phone]

## 2025-02-26 LAB
ANION GAP SERPL CALCULATED.3IONS-SCNC: 10 MMOL/L (ref 7–15)
BUN SERPL-MCNC: 23.3 MG/DL (ref 8–23)
CALCIUM SERPL-MCNC: 8.7 MG/DL (ref 8.8–10.4)
CHLORIDE SERPL-SCNC: 93 MMOL/L (ref 98–107)
CREAT SERPL-MCNC: 3.33 MG/DL (ref 0.67–1.17)
EGFRCR SERPLBLD CKD-EPI 2021: 20 ML/MIN/1.73M2
ERYTHROCYTE [DISTWIDTH] IN BLOOD BY AUTOMATED COUNT: 20.6 % (ref 10–15)
GLUCOSE BLDC GLUCOMTR-MCNC: 110 MG/DL (ref 70–99)
GLUCOSE BLDC GLUCOMTR-MCNC: 122 MG/DL (ref 70–99)
GLUCOSE BLDC GLUCOMTR-MCNC: 132 MG/DL (ref 70–99)
GLUCOSE BLDC GLUCOMTR-MCNC: 158 MG/DL (ref 70–99)
GLUCOSE BLDC GLUCOMTR-MCNC: 98 MG/DL (ref 70–99)
GLUCOSE SERPL-MCNC: 108 MG/DL (ref 70–99)
HCO3 SERPL-SCNC: 27 MMOL/L (ref 22–29)
HCT VFR BLD AUTO: 23 % (ref 40–53)
HGB BLD-MCNC: 7.2 G/DL (ref 13.3–17.7)
INR PPP: 3.29 (ref 0.85–1.15)
MAGNESIUM SERPL-MCNC: 2 MG/DL (ref 1.7–2.3)
MCH RBC QN AUTO: 29.4 PG (ref 26.5–33)
MCHC RBC AUTO-ENTMCNC: 31.3 G/DL (ref 31.5–36.5)
MCV RBC AUTO: 94 FL (ref 78–100)
PHOSPHATE SERPL-MCNC: 4.3 MG/DL (ref 2.5–4.5)
PLATELET # BLD AUTO: 456 10E3/UL (ref 150–450)
POTASSIUM SERPL-SCNC: 4.5 MMOL/L (ref 3.4–5.3)
RBC # BLD AUTO: 2.45 10E6/UL (ref 4.4–5.9)
SODIUM SERPL-SCNC: 130 MMOL/L (ref 135–145)
WBC # BLD AUTO: 11.6 10E3/UL (ref 4–11)

## 2025-02-26 PROCEDURE — 85014 HEMATOCRIT: CPT | Performed by: STUDENT IN AN ORGANIZED HEALTH CARE EDUCATION/TRAINING PROGRAM

## 2025-02-26 PROCEDURE — 250N000013 HC RX MED GY IP 250 OP 250 PS 637

## 2025-02-26 PROCEDURE — 36415 COLL VENOUS BLD VENIPUNCTURE: CPT | Performed by: STUDENT IN AN ORGANIZED HEALTH CARE EDUCATION/TRAINING PROGRAM

## 2025-02-26 PROCEDURE — 250N000013 HC RX MED GY IP 250 OP 250 PS 637: Performed by: HOSPITALIST

## 2025-02-26 PROCEDURE — 85610 PROTHROMBIN TIME: CPT

## 2025-02-26 PROCEDURE — 120N000001 HC R&B MED SURG/OB

## 2025-02-26 PROCEDURE — 0JBL0ZZ EXCISION OF RIGHT UPPER LEG SUBCUTANEOUS TISSUE AND FASCIA, OPEN APPROACH: ICD-10-PCS | Performed by: SURGERY

## 2025-02-26 PROCEDURE — 99232 SBSQ HOSP IP/OBS MODERATE 35: CPT | Performed by: HOSPITALIST

## 2025-02-26 PROCEDURE — 82435 ASSAY OF BLOOD CHLORIDE: CPT

## 2025-02-26 PROCEDURE — 84100 ASSAY OF PHOSPHORUS: CPT

## 2025-02-26 PROCEDURE — 250N000009 HC RX 250: Performed by: STUDENT IN AN ORGANIZED HEALTH CARE EDUCATION/TRAINING PROGRAM

## 2025-02-26 PROCEDURE — 250N000013 HC RX MED GY IP 250 OP 250 PS 637: Performed by: STUDENT IN AN ORGANIZED HEALTH CARE EDUCATION/TRAINING PROGRAM

## 2025-02-26 PROCEDURE — 83735 ASSAY OF MAGNESIUM: CPT

## 2025-02-26 PROCEDURE — 87340 HEPATITIS B SURFACE AG IA: CPT | Performed by: INTERNAL MEDICINE

## 2025-02-26 PROCEDURE — 36415 COLL VENOUS BLD VENIPUNCTURE: CPT | Performed by: INTERNAL MEDICINE

## 2025-02-26 RX ORDER — MAGNESIUM OXIDE 400 MG/1
400 TABLET ORAL EVERY 4 HOURS
Status: COMPLETED | OUTPATIENT
Start: 2025-02-26 | End: 2025-02-27

## 2025-02-26 RX ORDER — ALBUTEROL SULFATE 0.83 MG/ML
2.5 SOLUTION RESPIRATORY (INHALATION) EVERY 6 HOURS PRN
Status: DISCONTINUED | OUTPATIENT
Start: 2025-02-26 | End: 2025-03-01 | Stop reason: HOSPADM

## 2025-02-26 RX ADMIN — SEVELAMER CARBONATE 800 MG: 800 TABLET, FILM COATED ORAL at 18:16

## 2025-02-26 RX ADMIN — ALBUTEROL SULFATE 2.5 MG: 2.5 SOLUTION RESPIRATORY (INHALATION) at 18:23

## 2025-02-26 RX ADMIN — MIDODRINE HYDROCHLORIDE 10 MG: 2.5 TABLET ORAL at 18:16

## 2025-02-26 RX ADMIN — CINACALCET 60 MG: 30 TABLET ORAL at 20:38

## 2025-02-26 RX ADMIN — ACETAMINOPHEN 975 MG: 325 TABLET, FILM COATED ORAL at 21:24

## 2025-02-26 RX ADMIN — MIDODRINE HYDROCHLORIDE 10 MG: 2.5 TABLET ORAL at 08:33

## 2025-02-26 RX ADMIN — METOPROLOL SUCCINATE 12.5 MG: 25 TABLET, EXTENDED RELEASE ORAL at 08:33

## 2025-02-26 RX ADMIN — PANTOPRAZOLE SODIUM 40 MG: 40 TABLET, DELAYED RELEASE ORAL at 08:33

## 2025-02-26 RX ADMIN — Medication 400 MG: at 20:38

## 2025-02-26 RX ADMIN — Medication 1 CAPSULE: at 12:53

## 2025-02-26 RX ADMIN — SEVELAMER CARBONATE 800 MG: 800 TABLET, FILM COATED ORAL at 12:53

## 2025-02-26 RX ADMIN — ATORVASTATIN CALCIUM 40 MG: 40 TABLET, FILM COATED ORAL at 20:38

## 2025-02-26 RX ADMIN — METOPROLOL SUCCINATE 12.5 MG: 25 TABLET, EXTENDED RELEASE ORAL at 21:24

## 2025-02-26 RX ADMIN — CLOPIDOGREL BISULFATE 75 MG: 75 TABLET ORAL at 20:38

## 2025-02-26 RX ADMIN — ALBUTEROL SULFATE 2.5 MG: 2.5 SOLUTION RESPIRATORY (INHALATION) at 08:26

## 2025-02-26 RX ADMIN — Medication 2 LOZENGE: at 05:52

## 2025-02-26 RX ADMIN — ALLOPURINOL 100 MG: 100 TABLET ORAL at 08:34

## 2025-02-26 RX ADMIN — CALCITRIOL CAPSULES 0.25 MCG 0.25 MCG: 0.25 CAPSULE ORAL at 20:38

## 2025-02-26 RX ADMIN — SENNOSIDES AND DOCUSATE SODIUM 1 TABLET: 50; 8.6 TABLET ORAL at 08:33

## 2025-02-26 RX ADMIN — Medication 5 MG: at 21:24

## 2025-02-26 ASSESSMENT — ACTIVITIES OF DAILY LIVING (ADL)
ADLS_ACUITY_SCORE: 62
ADLS_ACUITY_SCORE: 67
ADLS_ACUITY_SCORE: 62
ADLS_ACUITY_SCORE: 68
ADLS_ACUITY_SCORE: 62
ADLS_ACUITY_SCORE: 70
ADLS_ACUITY_SCORE: 66
ADLS_ACUITY_SCORE: 62
ADLS_ACUITY_SCORE: 70
ADLS_ACUITY_SCORE: 66
ADLS_ACUITY_SCORE: 70
ADLS_ACUITY_SCORE: 62
ADLS_ACUITY_SCORE: 66
ADLS_ACUITY_SCORE: 62
ADLS_ACUITY_SCORE: 68
ADLS_ACUITY_SCORE: 62
ADLS_ACUITY_SCORE: 62
ADLS_ACUITY_SCORE: 70
ADLS_ACUITY_SCORE: 66

## 2025-02-26 NOTE — PROGRESS NOTES
[facility] RCAT Note    Date:2/26/2025  Admission Diagnosis: Leg + heel ulcer  Pulmonary History: None  Home Nebulizer/ MDI Use: None  Home Oxygen Use: None  Acuity Level (from RT Assessment flow sheet): 4    Aerosol Therapy Initiated: Albuterol modified to Q6 PRN per RCAT protocol      Current Oxygen Requirement: 1 LPM  Current SpO2: 98%     See 'RT Assessments' flow sheet for patient assessment scoring and Acuity Level Details.

## 2025-02-26 NOTE — PROGRESS NOTES
[facility]    Medicine Progress Note - Hospitalist Service    Date of Admission:  1/21/2025    Assessment & Plan    Arnulfo Pritchett is a 64 year old male with past medical history of severe heart failure (EF 30%), ESRD (on peritoneal dialysis), chronic paroxysmal AF on warfarin, LLE PAD s/p endarterectomy, RCC s/p nephrectomy admitted on 1/21/2025 for septic shock secondary to right leg PAD with worsening right heel necrotic ulcer. The patient was seen in ED at outside hospital on 1/3/25 and diagnosed with pneumonia, later completing course of Cefdinir. He then presented on 1/21/25 to outside hospital for right leg pain and found to have right leg ulcer and transferred to [facility] for vascular surgery evaluation. Now s/p right common femoral BK popliteal/tibial endarterectomy and right common femoral to below-knee popliteal/tibial PTFE bypass performed on 1/27/25. The patient was admitted to the ICU for requirement of mechanical ventilation and pressor support following surgery for multifactorial shock.       Hospitalist service consulted 1/31/2025 for transfer out of ICU and to assist with medical management along with vascular surgery, podiatry and Infectious disease.     Hx PAD of LLE s/p endarterectomy and fem-tibioperoneal trunk bypass on 10/25/24  Hx RLE arterial occlusion s/p SFA angioplasty and stent 5/2024  PAD RLE, right heel gangrene, occluded SFA, no evidence of osteomyelitis  s/p right common femoral and popliteal/tibial endarterectomy, right femoral to popliteal/tibial PTFE bypass 1/27/25   # Right groin hematoma resolving   -Found to have critical limb ischemia on admission on January 21, for details see admission note.   -Distributive shock following surgery followed by the ICU service until 1/31.  -Followed by vascular surgery, podiatry, wound care, and ID.  -Coumadin has been held since admission, was was started on IV heparin drip since admission.  -Restarted warfarin on  2/14/2025 after discussion with vascular surgery.   heparin gtt discontinued.  -Has been maintained on statin and Plavix 75 mg daily during this hospital stay.  -Recent IV piperacillin/tazobactam (Zosyn), discontinued 2/2 per ID after 12 days of Zosyn; follow-up cultures, monitor off antibiotics.  -Wound care per surgery and WOC.  -pain control, see hospital orders.  -worsening ischemic changes in 2nd-5th toes of right foot.  -Underwent transmetatarsal amputation on 2/11/2025.  Tolerated procedure well.  Intraoperatively no signs of infection.  No further surgery per podiatry.  -Leukocytosis improved postsurgery.  Hemoglobin trended down to 7 on 2/13/2025 requiring transfusion, Hgb stable since then.  Patient states since most recent surgery he has not been ambulatory requiring assist for mobility.      Multifactorial shock, resolved  ICU admission, weaned off vasopressors 1/30.  -Transitioned to midodrine with increase in prior to admission dose on1/31.  -Monitor vitals  2/25/2025.  See vascular surgery note 2/24/2025 okay to discharge per vascular surgery.     End-stage renal disease (ESRD), on peritoneal dialysis  Hx of secondary hyperparathyroidism and hyperphosphatemia, phosphorous elevated above baseline of 6 at most recent of 7.9  R plueral effusion, status post thoracentesis 2/24/2025  Nephrology following.  -Chronic peritoneal dialysis for the last 3.5 years. Patient expressed interest to switch to HD. Nephrology discussed with his primary nephrologist who agrees to transition to hemodialysis.  Underwent right tunneled IJ catheter placement by IR on 2/12/2025.  Now undergoing HD on MWF schedule.  -Continue midodrine.  -Peritoneal dialysis catheter removed 12/19 by vascular surgery  -2/25/2025.  1.7 L removed R thoracentesis 2/24/2025.  Potassium 6.3 , PD on low potassium bath today.  No fluid removed.  Recheck potassium tomorrow, PD on TTS schedule.  Fluid and electrolyte management per PD/nephrology.   Started on low potassium diet.  Telemetry for hyperkalemia  2/26/2025, potassium 4.5 on low potassium diet.  PD tomorrow.        acute hypoxemic respiratory failure, resolved  Right pleural effusion  Status post thoracentesis 1/24/25, repeat 2/2/25  -Following surgery on admission by vascular surgery was intubated.  Recent pneumonia treated with 3 weeks of Ceftin prior to this admission. Extubated 1/29/25  -Right pleural effusion.  Status post thoracentesis 1/24/25 with 1.5 L clear yellow fluid removed, likely transudative based on low cell count of 117, , and protein of 1.7. Possibly secondary to reduced peritoneal dialysis during hospitalization vs underlying severe HFrEF (30%).  -CT chest 2/1/25, see report, no clear pneumonia; large right pleural effusion noted.  -Now off oxygen. Encourage incentive spirometry.  -Antibiotics as above, discontinued by ID 2/2  -ID consulted 2/1 as above, recommend monitoring patient off antibiotics  -S/p repeat Right thoracentesis on 2/2 with 2.3 L of rolan fluid removed; additional studies, cultures (negative to date), cytology (negative for malignancy)   -repeat Cxr showing pleural effusion with SOB  1.7 L removed thoracentesis 2/24/2025.     Paroxysmal atrial fibrillation  Chronic anticoagulation prior to admission, warfarin   Ischemic cardiomyopathy with HLD, CAD s/p multiple stents, and severe HFrEF (30%)   Troponin elevation, likely type 2 MI  Wide complex tachycardia 2/1/25  Assessment-postoperatively has been maintained on heparin drip.  Coumadin held then restarted postop  Pharmacy dosing warfarin.  -Echo 1/31-EF 25 to 30%.  Severe global hypokinesis with abnormal septal motion consistent with conduction abnormality.  Severe inferior wall hypokinesis.  LVH.  RV mildly dilated and mildly reduced RV function.  Mild mitral regurgitation.  Mild to moderate mitral stenosis.  Moderate aortic stenosis.  Left ventricular fairly pressures elevated.  -Episode of wide-complex  tachycardia noted by nursing staff, 15 beats, up on 2/1/2025.  -Continue inpatient telemetry.  -EP consulted on 2/16 given tachycardia with rates to 120s. They evaluated patient and said not unexpected given patient's severe HFrEF.              - Recommended increasing Toprol XL to 12.5 mg BID (PTA was on 25 mg daily)          # Confusion  # Metabolic encephalopathy resolved  -Patient refused MRI scan        Anemia of chronic disease  Baseline around 10.  Has been stable in the 8 range. Did need 1 U PRBC on 2/8 and 2/13 for Hgb<7 without signs of active bleeding.  -Nephrology started patient on EPO on 2/9 and to continue weekly.  -Continue to monitor hemoglobin daily,         # Hyponatremia  Hyperkalemia  Continue hemodialysis        Hyperglycemia  Type 2 diabetes   hemoglobin A1c 5.7% October 24  PTA regimen: Lantus 35 units at bedtime  Insulin requirements have been decreasing and he started having hypoglycemia episodes after being started on HD on 2/13/2025  Discontinue Lantus and carb coverage  Continue sliding scale insulin. Discharge insulin dosing dependent on requirements from sliding scale.  Continue to monitor FSBS        Mixed anion gap acidosis, resolved  -Improved respiratory function, monitor.  Anion gap resolved 1/31.     Abdominal discomfort 1/30, resolved  Noted to have some right upper quadrant pain 1/30.  Now resolved.  LFTs normal.  Was on Zosyn for above surgical issues.   -Monitor abdominal exam.  -Antibiotics managed as noted above.     severe protein calorie nutrition in the context of acute on chronic illness or injury   -Nutrition consulted.  Nutrition supplement.  Low potassium.  -Speech and language therapy (SLP) consulted, diet per speech therapy.     Gout  -PTA allopurinol resolved and changed to dialysis dosing 100 mg 3 times weekly     History of renal cell carcinoma   # Patient had CTA done in in October 2024 which showed a 4 mm hypoenhancing nodule within the lower part of the left  kidney which radiology said that might represent a small primary renal cell carcinoma.  Patient has been aware of this findings and did not follow-up with outpatient  CT abdomen pelvis done in July 2025 showed cyst in the left kidney  -Did not want workup with urology but per prior hospitalist 2/20/2025 he said that he is open to seeing urology at [facility]  -Case management consulted        -Status post right nephrectomy, monitor.     History of obstructive sleep apnea.  By report, intolerant of CPAP   -Monitor, follow-up with outpatient provider.     Weakness and deconditioning  -PT, OT, speech and language therapy (SLP) consulted.  -Increase activity as tolerated.     Disposition  -Anticipated discharge to transitional care unit with dialysis facility or transport.       # Goals of care were discussed with the patient and prior hospitalist on 2/20/25 and he does not want to be full code and does not want ribs to be broken or be on the ventilator and code status changed to DNR            Diet: Advance Diet as Tolerated: Regular Diet Adult; Regular Diet Adult; 2 gm K Diet    DVT Prophylaxis: IV heparin  Rosario Catheter: Not present  Lines: PRESENT      CVC Double Lumen Right Internal jugular Non - valved (open ended);Tunneled-Site Assessment: WDL      Cardiac Monitoring: ACTIVE order. Indication: Electrolyte Imbalance (24 hours)- Magnesium <1.3 mg/ml; Potassium < =2.8 or > 5.5 mg/ml  Code Status: No CPR- Do NOT Intubate      Clinically Significant Risk Factors        # Hyperkalemia: Highest K = 6.3 mmol/L in last 2 days, will monitor as appropriate  # Hyponatremia: Lowest Na = 130 mmol/L in last 2 days, will monitor as appropriate  # Hypochloremia: Lowest Cl = 93 mmol/L in last 2 days, will monitor as appropriate      # Hypoalbuminemia: Lowest albumin = 1.6 g/dL at 2/12/2025  7:26 AM, will monitor as appropriate     # Hypertension: Noted on problem list    # Chronic heart failure with reduced ejection fraction:  last echo with EF <40%          # DMII: A1C = 6.6 % (Ref range: <5.7 %) within past 6 months    # Severe Malnutrition: based on nutrition assessment      # Financial/Environmental Concerns:           Social Drivers of Health    Tobacco Use: Medium Risk (2/19/2025)    Patient History     Smoking Tobacco Use: Former     Smokeless Tobacco Use: Never   Alcohol Use: Unknown (11/28/2018)    [facility] from [facility], [facility]    AUDIT-C     Frequency of Alcohol Consumption: Monthly or less     Frequency of Binge Drinking: Never   Transportation Needs: High Risk (1/21/2025)    Transportation Needs     Within the past 12 months, has lack of transportation kept you from medical appointments, getting your medicines, non-medical meetings or appointments, work, or from getting things that you need?: Yes   Physical Activity: Unknown (10/8/2024)    Exercise Vital Sign     Days of Exercise per Week: 0 days   Social Connections: Unknown (10/8/2024)    Social Connection and Isolation Panel [NHANES]     Frequency of Social Gatherings with Friends and Family: Patient declined          Disposition Plan     Medically Ready for Discharge: Anticipated Tomorrow after dialysis to TCU with dialysis capability or transport.         Lynette Fox MD  Hospitalist Service  [facility]  Securely message with [employer] (more info)  Text page via [employer] Paging/Directory       ______________________________________________________________________    Interval History   Feels well, no complaints.  Compliant to low potassium diet.  Next PD Thursday, 2/27/2025.  No dyspnea, on supplemental O2 without change 1 L per NC.  Wean as able.      Physical Exam   Vital Signs: Temp: 97.7  F (36.5  C) Temp src: Oral BP: 117/82 Pulse: 104   Resp: 20 SpO2: 100 % O2 Device: Nasal cannula Oxygen Delivery: 1 LPM  Weight: 166 lbs 14.21 oz    General/Constitutional:   NAD,  more alert, calm, cooperative  Chest/Respiratory: Respirations nonlabored room air  Cardiovascular:  LE edema none  Gastrointestinal/Abdomen:  soft, nontender,   Neuro.  Gross motor tested, nonfocal,  Psych affect calm.

## 2025-02-26 NOTE — PLAN OF CARE
Goal Outcome Evaluation:    Problem: Adult Inpatient Plan of Care  Goal: Absence of Hospital-Acquired Illness or Injury  Intervention: Prevent Skin Injury  Recent Flowsheet Documentation  Taken 2/26/2025 0200 by Evelia Hernandez RN  Body Position:   refuses positioning   heels elevated  Taken 2/25/2025 2145 by Evelia Hernandez RN  Body Position: (boost,)   weight shifting   supine, head elevated   heels elevated   log-rolled  Taken 2/25/2025 2045 by Evelia Hernandez RN  Body Position: refuses positioning     Shift: 7p-7a  Surgery/POD#: Admitted 1/21 for septic shock due to RLE PAD & worsening R heel necrotic ulcer.  1/27: POD 30 from a R fem pop bypass & endarterectomy.  2/11: POD 15 from a R transmetatarsal amputation   2/19: removed peritoneal dialysis catheter  Thoracentesis on 2/2 & 2/24 2/24: hep gtt stopped  Behavior & Aggression: Green  Fall Risk: Yes  Orientation: A&O x4  ABNL VS/O2: VSS - on 1L NC for patient comfort   Tele: NSR  ABNL Labs: Mg replacement protocol - rechecks this AM  Pain Management: scheduled tylenol   Bowel/Bladder: urinal at bedside/hemodialysis patient, bowel sounds hypoactive, passing flatus, last BM 2/23  Drains: NA  Lines: PIV x2 SL'd, R CVC dialysis cath  Skin: R groin island dressing CDI, R calf tegaderm, R foot dressing CDI, abd gauze w/ small drainage, sacral mepi  Diet: Reg/2g K; denies N/V  Activity Level: x2 lift, T/R - but pt refuses  Tests/Procedures: dialysis T/Th/Sat  Anticipated  DC Date: pending  Significant Information:   PT/OT, SW, neph, vascular following. Doppler pulses.  Soft BPs - held metoprolol per order parameters.   PRN throat lozenge given this AM, oral suction at bedside - pt trying to cough up.

## 2025-02-26 NOTE — PROGRESS NOTES
Care Management Follow Up    Length of Stay (days): 36    Expected Discharge Date: 02/27/2025     Concerns to be Addressed: discharge planning     Patient plan of care discussed at interdisciplinary rounds: Yes    Anticipated Discharge Disposition: Skilled Nursing Facility       Anticipated Discharge Services:  outpatient hemodialysis  Anticipated Discharge DME:      Patient/family educated on Medicare website which has current facility and service quality ratings:    Education Provided on the Discharge Plan:  yes  Patient/Family in Agreement with the Plan: yes    Referrals Placed by CM/SW:    Private pay costs discussed:  did not discuss    Discussed  Partnership in Safe Discharge Planning  document with patient/family: No     Handoff Completed: No, handoff not indicated or clinically appropriate    Additional Information:  Updated by  that TCU acceptance at [facility]. Writer called [facility] at [phone] and spoke to Ngoc about changing dialysis chair from [place] to [facility]. Ngoc messaged Veronica coordinator for patient to confirm new chair time location    Next Steps: await confirmation from [facility] regarding chair time closer to [place] [phone] call back from Veronica at [facility] Admissions with new chair time of Monday, Wednesday and Friday  at 10:40 AM in [facility]. The address is [address] [place] [address]. The phone number for dialysis unit is [phone] Updated nephrology about discharge plans for tomorrow. Call placed to [facility] in [facility] and left  message to confirm availability for Friday 2/28    Calli Hyman RN   [facility]   Phone [phone], [employer] or [phone]

## 2025-02-26 NOTE — PROGRESS NOTES
Care Management Follow Up    Length of Stay (days): 36    Expected Discharge Date: 02/27/2025     Concerns to be Addressed: discharge planning     Patient plan of care discussed at interdisciplinary rounds: Yes    Anticipated Discharge Disposition: Skilled Nursing Facility      Anticipated Discharge Services:    Anticipated Discharge DME:      Patient/family educated on Medicare website which has current facility and service quality ratings:    Education Provided on the Discharge Plan:    Patient/Family in Agreement with the Plan: yes    Referrals Placed by CM/SW:    Private pay costs discussed: Not applicable    Discussed  Partnership in Safe Discharge Planning  document with patient/family: Yes:    Handoff Completed: Yes, non-[facility] PCP: External handoff communication completed    Additional Information:  Writer confirmed TCU bed available for patient at [facility] tomorrow     Writer scheduled a ride with [employer]    They will  patient around 10 AM from [facility] this Friday and bring him to [facility] [address] [place] [address] for his four hour dialysis run and return him to [facility] the cost is $109.20 - $327.60 for the week and ALL runs are scheduled Feb 28th - March 7th    Facility aware that patient needs a lift - he needs to lift into [facility] transport and lift out at [facility]     Next Steps:  PAS - Orders       Writer arranged tentative transport using [employer] , patient aware of costs, ride time is for between 1540 - 1620 THURSDAY       KATELYN Duarte

## 2025-02-26 NOTE — PROGRESS NOTES
Vascular surgery progress note    Our team was notified of some drainage from the right groin wound discovered yesterday.  No new pain or discomfort in the right groin.  No fevers or chills.    Vitals reviewed    On exam Arnulfo is resting comfortably in bed  No drainage from the right groin wound.  Proximal half of the wound is entirely healed.  Chronic eschar over the distal half with faint erythema surrounding.  This was debrided.  See procedure note below    Labs reviewed  Normal potassium today  Stable mild leukocytosis  Hemoglobin 7.2  Platelets 456  INR 3.3    Assessment and Plan:  64-year-old male with multiple comorbidities, s/p right femoral to below-knee popliteal bypass with composite greater saphenous and ringed PTFE bypass graft, now s/p right transmetatarsal amputation with remaining necrotic right heel ulcer.  Recently switched from PD to HD after tunnel line placement. S/p PD catheter removal 2/19/2025. Awaiting TCU bed.    -Drainage reported from right groin wound yesterday, though no drainage appreciated on exam today.  We debrided the eschar from the distal half of the incision and dressed with Aquacel which can be changed daily. No evidence of deep infection.  - Continue plavix and warfarin  - awaiting discharge to TCU    Seen and discussed with Dr. Hein.      Procedure Note:    Procedure name: Full thickness debridement of left groin wound  Description:  Verbal consent was obtained.  We debrided the chronic eschar in the left groin wound down to healthy, bleeding subcutaneous tissue.  Fibrinous debris at the base.  No evidence of deep infection.  No tunneling.  Dressing with Aquacel and gauze was placed, and this can be changed daily going forward.  Approximate wound size is 4 cm x 1 cm.    Jersey Maki MD    STAFF:  ` As above.  Did have an eschar at the distal portion of his right groin incision that is been very stable but had some drainage.  We did explore the area and perform a  full-thickness/subcutaneous excisional debridement with a 10 blade scalpel removing the eschar and some underlying nonviable fatty tissue.  Probing revealed  no deeper component.  Once we excised to bleeding tissue of this ulcer that is less than 4 mm in depth we applied a moistened Aquacel Ag.  Do not feel systemic antibiotics are indicated.  Continue with local wound care.  Discussed with patient.    Patrick Hein MD

## 2025-02-27 ENCOUNTER — DOCUMENTATION ONLY (OUTPATIENT)
Dept: OTHER | Facility: CLINIC | Age: 65
End: 2025-02-27
Payer: MEDICARE

## 2025-02-27 VITALS
DIASTOLIC BLOOD PRESSURE: 85 MMHG | SYSTOLIC BLOOD PRESSURE: 119 MMHG | BODY MASS INDEX: 21.9 KG/M2 | WEIGHT: 166.01 LBS | TEMPERATURE: 98.2 F | RESPIRATION RATE: 20 BRPM | HEART RATE: 107 BPM | OXYGEN SATURATION: 99 %

## 2025-02-27 LAB
ABO + RH BLD: NORMAL
ANION GAP SERPL CALCULATED.3IONS-SCNC: 7 MMOL/L (ref 7–15)
BLD GP AB SCN SERPL QL: NEGATIVE
BLD PROD TYP BPU: NORMAL
BLOOD COMPONENT TYPE: NORMAL
BUN SERPL-MCNC: 27.9 MG/DL (ref 8–23)
CALCIUM SERPL-MCNC: 8.6 MG/DL (ref 8.8–10.4)
CHLORIDE SERPL-SCNC: 96 MMOL/L (ref 98–107)
CODING SYSTEM: NORMAL
CREAT SERPL-MCNC: 3.88 MG/DL (ref 0.67–1.17)
CROSSMATCH: NORMAL
EGFRCR SERPLBLD CKD-EPI 2021: 16 ML/MIN/1.73M2
ERYTHROCYTE [DISTWIDTH] IN BLOOD BY AUTOMATED COUNT: 20.7 % (ref 10–15)
GLUCOSE BLDC GLUCOMTR-MCNC: 106 MG/DL (ref 70–99)
GLUCOSE BLDC GLUCOMTR-MCNC: 131 MG/DL (ref 70–99)
GLUCOSE BLDC GLUCOMTR-MCNC: 139 MG/DL (ref 70–99)
GLUCOSE BLDC GLUCOMTR-MCNC: 143 MG/DL (ref 70–99)
GLUCOSE BLDC GLUCOMTR-MCNC: 151 MG/DL (ref 70–99)
GLUCOSE SERPL-MCNC: 185 MG/DL (ref 70–99)
HBV SURFACE AG SERPL QL IA: NONREACTIVE
HCO3 SERPL-SCNC: 29 MMOL/L (ref 22–29)
HCT VFR BLD AUTO: 21.3 % (ref 40–53)
HGB BLD-MCNC: 6.3 G/DL (ref 13.3–17.7)
INR PPP: 3.23 (ref 0.85–1.15)
ISSUE DATE AND TIME: NORMAL
MAGNESIUM SERPL-MCNC: 2 MG/DL (ref 1.7–2.3)
MCH RBC QN AUTO: 28.8 PG (ref 26.5–33)
MCHC RBC AUTO-ENTMCNC: 29.6 G/DL (ref 31.5–36.5)
MCV RBC AUTO: 97 FL (ref 78–100)
PHOSPHATE SERPL-MCNC: 3.9 MG/DL (ref 2.5–4.5)
PLATELET # BLD AUTO: 355 10E3/UL (ref 150–450)
POTASSIUM SERPL-SCNC: 4.8 MMOL/L (ref 3.4–5.3)
RBC # BLD AUTO: 2.19 10E6/UL (ref 4.4–5.9)
SODIUM SERPL-SCNC: 132 MMOL/L (ref 135–145)
SPECIMEN EXP DATE BLD: NORMAL
UNIT ABO/RH: NORMAL
UNIT NUMBER: NORMAL
UNIT STATUS: NORMAL
UNIT TYPE ISBT: 5100
WBC # BLD AUTO: 8.9 10E3/UL (ref 4–11)

## 2025-02-27 PROCEDURE — 82565 ASSAY OF CREATININE: CPT

## 2025-02-27 PROCEDURE — 86900 BLOOD TYPING SEROLOGIC ABO: CPT | Performed by: HOSPITALIST

## 2025-02-27 PROCEDURE — 250N000013 HC RX MED GY IP 250 OP 250 PS 637

## 2025-02-27 PROCEDURE — 250N000013 HC RX MED GY IP 250 OP 250 PS 637: Performed by: HOSPITALIST

## 2025-02-27 PROCEDURE — P9016 RBC LEUKOCYTES REDUCED: HCPCS | Performed by: HOSPITALIST

## 2025-02-27 PROCEDURE — 120N000001 HC R&B MED SURG/OB

## 2025-02-27 PROCEDURE — 36415 COLL VENOUS BLD VENIPUNCTURE: CPT | Performed by: STUDENT IN AN ORGANIZED HEALTH CARE EDUCATION/TRAINING PROGRAM

## 2025-02-27 PROCEDURE — 86850 RBC ANTIBODY SCREEN: CPT | Performed by: HOSPITALIST

## 2025-02-27 PROCEDURE — 90937 HEMODIALYSIS REPEATED EVAL: CPT

## 2025-02-27 PROCEDURE — 250N000011 HC RX IP 250 OP 636: Performed by: INTERNAL MEDICINE

## 2025-02-27 PROCEDURE — 85610 PROTHROMBIN TIME: CPT

## 2025-02-27 PROCEDURE — 86923 COMPATIBILITY TEST ELECTRIC: CPT | Performed by: HOSPITALIST

## 2025-02-27 PROCEDURE — 634N000001 HC RX 634: Mod: JZ | Performed by: INTERNAL MEDICINE

## 2025-02-27 PROCEDURE — 90935 HEMODIALYSIS ONE EVALUATION: CPT | Performed by: INTERNAL MEDICINE

## 2025-02-27 PROCEDURE — 80048 BASIC METABOLIC PNL TOTAL CA: CPT

## 2025-02-27 PROCEDURE — 82435 ASSAY OF BLOOD CHLORIDE: CPT

## 2025-02-27 PROCEDURE — 84100 ASSAY OF PHOSPHORUS: CPT

## 2025-02-27 PROCEDURE — 99232 SBSQ HOSP IP/OBS MODERATE 35: CPT | Performed by: HOSPITALIST

## 2025-02-27 PROCEDURE — 250N000009 HC RX 250: Performed by: STUDENT IN AN ORGANIZED HEALTH CARE EDUCATION/TRAINING PROGRAM

## 2025-02-27 PROCEDURE — 36415 COLL VENOUS BLD VENIPUNCTURE: CPT | Performed by: HOSPITALIST

## 2025-02-27 PROCEDURE — 85014 HEMATOCRIT: CPT | Performed by: STUDENT IN AN ORGANIZED HEALTH CARE EDUCATION/TRAINING PROGRAM

## 2025-02-27 PROCEDURE — 83735 ASSAY OF MAGNESIUM: CPT

## 2025-02-27 PROCEDURE — 258N000003 HC RX IP 258 OP 636: Performed by: INTERNAL MEDICINE

## 2025-02-27 RX ORDER — MAGNESIUM OXIDE 400 MG/1
400 TABLET ORAL EVERY 4 HOURS
Status: COMPLETED | OUTPATIENT
Start: 2025-02-27 | End: 2025-02-27

## 2025-02-27 RX ORDER — ALBUMIN (HUMAN) 12.5 G/50ML
50 SOLUTION INTRAVENOUS
Status: DISCONTINUED | OUTPATIENT
Start: 2025-02-27 | End: 2025-02-27

## 2025-02-27 RX ORDER — HEPARIN SODIUM 1000 [USP'U]/ML
500 INJECTION, SOLUTION INTRAVENOUS; SUBCUTANEOUS CONTINUOUS
Status: DISCONTINUED | OUTPATIENT
Start: 2025-02-27 | End: 2025-02-27

## 2025-02-27 RX ADMIN — HEPARIN SODIUM 1600 UNITS: 1000 INJECTION INTRAVENOUS; SUBCUTANEOUS at 10:01

## 2025-02-27 RX ADMIN — SEVELAMER CARBONATE 800 MG: 800 TABLET, FILM COATED ORAL at 17:47

## 2025-02-27 RX ADMIN — ACETAMINOPHEN 975 MG: 325 TABLET, FILM COATED ORAL at 21:00

## 2025-02-27 RX ADMIN — MIDODRINE HYDROCHLORIDE 10 MG: 2.5 TABLET ORAL at 17:46

## 2025-02-27 RX ADMIN — CLOPIDOGREL BISULFATE 75 MG: 75 TABLET ORAL at 20:53

## 2025-02-27 RX ADMIN — EPOETIN ALFA-EPBX 4000 UNITS: 4000 INJECTION, SOLUTION INTRAVENOUS; SUBCUTANEOUS at 10:01

## 2025-02-27 RX ADMIN — HEPARIN SODIUM 500 UNITS/HR: 1000 INJECTION INTRAVENOUS; SUBCUTANEOUS at 09:59

## 2025-02-27 RX ADMIN — CALCITRIOL CAPSULES 0.25 MCG 0.25 MCG: 0.25 CAPSULE ORAL at 20:55

## 2025-02-27 RX ADMIN — Medication: at 09:53

## 2025-02-27 RX ADMIN — Medication 1 CAPSULE: at 14:52

## 2025-02-27 RX ADMIN — SODIUM CHLORIDE 250 ML: 9 INJECTION, SOLUTION INTRAVENOUS at 09:52

## 2025-02-27 RX ADMIN — IRON SUCROSE 50 MG: 20 INJECTION, SOLUTION INTRAVENOUS at 10:01

## 2025-02-27 RX ADMIN — Medication 400 MG: at 17:46

## 2025-02-27 RX ADMIN — SENNOSIDES AND DOCUSATE SODIUM 1 TABLET: 50; 8.6 TABLET ORAL at 20:53

## 2025-02-27 RX ADMIN — Medication 400 MG: at 01:09

## 2025-02-27 RX ADMIN — Medication 400 MG: at 20:53

## 2025-02-27 RX ADMIN — ALBUTEROL SULFATE 2.5 MG: 2.5 SOLUTION RESPIRATORY (INHALATION) at 07:53

## 2025-02-27 RX ADMIN — HEPARIN SODIUM 500 UNITS: 1000 INJECTION INTRAVENOUS; SUBCUTANEOUS at 09:59

## 2025-02-27 RX ADMIN — Medication 5 MG: at 20:53

## 2025-02-27 RX ADMIN — ALBUTEROL SULFATE 2.5 MG: 2.5 SOLUTION RESPIRATORY (INHALATION) at 14:52

## 2025-02-27 RX ADMIN — ACETAMINOPHEN 975 MG: 325 TABLET, FILM COATED ORAL at 14:52

## 2025-02-27 RX ADMIN — SODIUM CHLORIDE 200 ML: 9 INJECTION, SOLUTION INTRAVENOUS at 09:52

## 2025-02-27 RX ADMIN — ATORVASTATIN CALCIUM 40 MG: 40 TABLET, FILM COATED ORAL at 20:53

## 2025-02-27 RX ADMIN — METOPROLOL SUCCINATE 12.5 MG: 25 TABLET, EXTENDED RELEASE ORAL at 20:55

## 2025-02-27 RX ADMIN — MIDODRINE HYDROCHLORIDE 10 MG: 2.5 TABLET ORAL at 07:52

## 2025-02-27 ASSESSMENT — ACTIVITIES OF DAILY LIVING (ADL)
ADLS_ACUITY_SCORE: 67
ADLS_ACUITY_SCORE: 66
ADLS_ACUITY_SCORE: 66
ADLS_ACUITY_SCORE: 76
ADLS_ACUITY_SCORE: 76
ADLS_ACUITY_SCORE: 67
ADLS_ACUITY_SCORE: 76
ADLS_ACUITY_SCORE: 66
ADLS_ACUITY_SCORE: 66
ADLS_ACUITY_SCORE: 76
ADLS_ACUITY_SCORE: 67
ADLS_ACUITY_SCORE: 66
ADLS_ACUITY_SCORE: 66
ADLS_ACUITY_SCORE: 76
ADLS_ACUITY_SCORE: 66
ADLS_ACUITY_SCORE: 67
ADLS_ACUITY_SCORE: 66
ADLS_ACUITY_SCORE: 76
ADLS_ACUITY_SCORE: 66
ADLS_ACUITY_SCORE: 76

## 2025-02-27 NOTE — PLAN OF CARE
Shift: 1/27/25 2601-8404  Surgery/POD#: Admitted 1/21 for septic shock due to RLE PAD & worsening R heel necrotic ulcer.  1/27: POD 31 from a R fem pop bypass & endarterectomy.  2/11: POD 16 from a R transmetatarsal amputation   2/19: removed peritoneal dialysis catheter  Thoracentesis on 2/2 & 2/24 2/24: hep gtt stopped  Behavior & Aggression: Green  Fall Risk: Yes  Orientation: A&Ox4  ABNL VS/O2: VSS on 1L NC for pt comfort  Tele: SR, ST  ABNL Labs:   Pain Management: scheduled tylenol  Bowel/Bladder: urinal at bedside/on hemodialysis (tan output), bowel sounds active, passing flatus, x1 BM today  Lines: PIV x2 Sl'd, R chest CVC dialysis   Skin: R groin CDI(changed by Vascular), R calf tegaderm, R foot dressing CDI (changed), abd gauze w/ small drainage, sacral mepi intact (changed)  Diet: Regular/2g K, denies N/V  Activity Level: x2 lift, Refuses T/R, turned x1 during shift  Tests/Procedures: Completed dialysis today  Anticipated  DC Date: Tomorrow pending  Significant Information:   Hospitalist, vascular, SW following. CMS intact, doppler pulses. CHG wipes completed. Nebulizer x2 during shift, LS diminished with intermittent productive cough.

## 2025-02-27 NOTE — PROGRESS NOTES
Care Management Follow Up    Length of Stay (days): 37    Expected Discharge Date: 02/27/2025     Concerns to be Addressed: discharge planning     Patient plan of care discussed at interdisciplinary rounds: Yes    Anticipated Discharge Disposition: Skilled Nursing Facility    Anticipated Discharge Services:  outpatient dialysis  Anticipated Discharge DME:      Patient/family educated on Medicare website which has current facility and service quality ratings: yes  Education Provided on the Discharge Plan:    Patient/Family in Agreement with the Plan: yes    Referrals Placed by CM/SW: [facility], Post Acute Facilities, Transportation  Private pay costs discussed: Not applicable    Discussed  Partnership in Safe Discharge Planning  document with patient/family: No     Handoff Completed: No, handoff not indicated or clinically appropriate    Additional Information:  Writer follow up done with [facility] in [place]. Writer confirmed with manager Shreya  today that the new chair time is Monday, Wednesday and Friday at 7:50 AM in [facility]. The address is [address] [place] [address]. The phone number for dialysis unit is [phone] Patient needs to arrive at 0730 the first day. The unit can accommodate the first run there on Friday, February 28,2025.    Next Steps: Confirm for discharge today. Updated SW about change in dialysis time.     Calli Hyman RN   [facility]   Phone [phone], [employer] or [phone]

## 2025-02-27 NOTE — PLAN OF CARE
Goal Outcome Evaluation:  Problem: Adult Inpatient Plan of Care  Goal: Absence of Hospital-Acquired Illness or Injury  Intervention: Prevent Skin Injury  Recent Flowsheet Documentation  Taken 2/27/2025 0100 by Evelia Hernandez RN  Body Position: refuses positioning  Skin Protection: adhesive use limited  Taken 2/26/2025 2124 by Evelia Hernandez RN  Body Position:   refuses positioning   weight shifting   heels elevated  Taken 2/26/2025 2030 by Evelia Hernandez RN  Skin Protection: adhesive use limited   Shift: 7p-7a  Surgery/POD#: Admitted 1/21 for septic shock due to RLE PAD & worsening R heel necrotic ulcer.  1/27: POD 31 from a R fem pop bypass & endarterectomy.  2/11: POD 16 from a R transmetatarsal amputation   2/19: removed peritoneal dialysis catheter  Thoracentesis on 2/2 & 2/24 2/24: hep gtt stopped  Behavior & Aggression: Green  Fall Risk: Yes  Orientation: A&O x4  ABNL VS/O2: VSS on 1L NC for pt comfort  Tele: SR w/ 1st degree AV block  ABNL Labs: Mg replaced - recheck this AM; Cr 3.33, Hgb 7.2  Pain Management: scheduled tylenol  Bowel/Bladder: urinal at bedside/on hemodialysis, bowel sounds hypoactive, passing flatus, x1 BM overnight  Drains: NA  Lines: PIV x2 Sl'd, R chest CVC dialysis   Skin: R groin CDI, R calf tegaderm, R foot dressing CDI, abd gauze w/ small drainage, sacral mepi intact  Diet: Regular/2g K, denies N/V  Activity Level: x2 lift, T/R - pt refuses  Tests/Procedures: dialysis 2/27 @ 8am  Anticipated  DC Date: 2/27 to [facility], ride set up for 3:40-4:20pm  Significant Information:   Hospitalist, vascular, SW following. Doppler pulses.

## 2025-02-27 NOTE — PROGRESS NOTES
Assessment and Plan:     End-stage renal disease on dialysis: Running today for Tuesday Thursday Saturday schedule.  We are using a right CVC with 400 blood flow rate.  3-hour run with 2 L ultrafiltration.  2K, 35 bicarb, 138 sodium.  Low-dose heparin, EPO 4000 units on the run.  He is also getting Venofer 50 mg IV on dialysis.    He is on midodrine 10 mg twice a day.    He is on calcitriol, Sensipar, Renvela.          Bilateral lower extremity vascular disease.  Status post multiple angioplasty, stent and bypass procedures.    Right pleural effusion: Status post thoracentesis 1/24/2025 and 2/2/2025.    Atrial fibrillation: On metoprolol and Coumadin.                 Review of Systems:   No complaints on dialysis.  Tolerating it well.          Medications:     Current Facility-Administered Medications   Medication Dose Route Frequency Provider Last Rate Last Admin    - MEDICATION INSTRUCTIONS for Dialysis Patients -   Does not apply See Admin Instructions Evelia Appiah PA-C        acetaminophen (TYLENOL) tablet 975 mg  975 mg Oral Q8H Evelia Appiah PA-C   975 mg at 02/26/25 2124    allopurinol (ZYLOPRIM) tablet 100 mg  100 mg Oral Once per day on Monday Wednesday Friday Elizabeth Slater MD   100 mg at 02/26/25 0834    [Held by provider] allopurinol (ZYLOPRIM) tablet 200 mg  200 mg Oral QPM Elizabeth Slater MD   200 mg at 01/26/25 1957    atorvastatin (LIPITOR) tablet 40 mg  40 mg Oral or Feeding Tube At Bedtime Evelia Appiah PA-C   40 mg at 02/26/25 2038    calcitRIOL (ROCALTROL) capsule 0.25 mcg  0.25 mcg Oral At Bedtime Evelia Appiah PA-C   0.25 mcg at 02/26/25 2038    cinacalcet (SENSIPAR) tablet 60 mg  60 mg Oral Q Mon Wed Fri AM Evelia Appiah PA-C   60 mg at 02/26/25 2038    clopidogrel (PLAVIX) tablet 75 mg  75 mg Oral or Feeding Tube QPM Evelia Appiah PA-C   75 mg at 02/26/25 2038    [Held by provider] insulin aspart (NovoLOG) injection (RAPID ACTING)    Subcutaneous TID w/meals Walter Dempsey MD   2 Units at 02/12/25 1700    insulin aspart (NovoLOG) injection (RAPID ACTING)  1-7 Units Subcutaneous TID AC Evelia Appiah PA-C   1 Units at 02/24/25 1724    insulin aspart (NovoLOG) injection (RAPID ACTING)  1-5 Units Subcutaneous At Bedtime Evelia Appiah PA-C   1 Units at 02/07/25 0040    [Held by provider] insulin glargine (LANTUS PEN) injection 10 Units  10 Units Subcutaneous Daily Ashley Roper MD        iron sucrose (VENOFER) injection 50 mg  50 mg Intravenous Once in dialysis/CRRT Jimbo Foreman MD   50 mg at 02/27/25 1001    melatonin tablet 5 mg  5 mg Oral At Bedtime Evelia Appiah PA-C   5 mg at 02/26/25 2124    metoprolol succinate ER (TOPROL-XL) 24 hr half-tab 12.5 mg  12.5 mg Oral BID Evelia Appiah PA-C   12.5 mg at 02/26/25 2124    midodrine (PROAMATINE) tablet 10 mg  10 mg Oral BID w/meals Evelia Appiah PA-C   10 mg at 02/27/25 0752    multivitamin RENAL (TRIPHROCAPS) capsule 1 capsule  1 capsule Oral Daily Evelia Appiah PA-C   1 capsule at 02/26/25 1253    pantoprazole (PROTONIX) EC tablet 40 mg  40 mg Oral QAM AC Evelia Appiah PA-C   40 mg at 02/26/25 0833    polyethylene glycol (MIRALAX) Packet 17 g  17 g Oral Daily Evelia Appiah PA-C   17 g at 02/21/25 0840    senna-docusate (SENOKOT-S/PERICOLACE) 8.6-50 MG per tablet 1 tablet  1 tablet Oral BID Evelia Appiah PA-C   1 tablet at 02/26/25 0833    sevelamer carbonate (RENVELA) tablet 800 mg  800 mg Oral TID w/meals Evelia Appiah PA-C   800 mg at 02/26/25 1816    sodium chloride (PF) 0.9% PF flush 3 mL  3 mL Intracatheter Q8H Evelia Appiah PA-C   3 mL at 02/26/25 2126    sodium chloride (PF) 0.9% PF flush 9 mL  9 mL Intracatheter During Dialysis/CRRT (from stock) Jimbo Foreman MD        sodium chloride (PF) 0.9% PF flush 9 mL  9 mL Intracatheter During Dialysis/CRRT (from stock) Jimbo Foreman MD        Warfarin Dose Required Daily -  Pharmacist Managed  1 each Oral See Admin Instructions Jersey Maki MD         Current Facility-Administered Medications   Medication Dose Route Frequency Provider Last Rate Last Admin    dextrose 10% infusion   Intravenous Continuous PRN Evelia Appiah PA-C        heparin 10,000 units/10 mL infusion (DIALYSIS USE)  500 Units/hr Hemodialysis Machine Continuous Jimbo Foreman MD 0.5 mL/hr at 25 0959 500 Units/hr at 25 0959    Patient is already receiving anticoagulation with heparin, enoxaparin (LOVENOX), warfarin (COUMADIN)  or other anticoagulant medication   Does not apply Continuous PRN Evelia Appiah PA-C         Current active medications and PTA medications reviewed, see medication list for details.            Physical Exam:   Vitals were reviewed  Patient Vitals for the past 24 hrs:   BP Temp Temp src Pulse Resp SpO2 Weight   25 0945 116/79 -- -- 83 -- -- --   25 0930 115/81 -- -- 83 -- -- --   25 0915 108/86 -- -- 118 -- 98 % --   25 0900 115/82 -- -- 91 -- -- --   25 0845 109/78 -- -- 73 -- -- --   25 0830 109/76 -- -- 88 -- -- --   25 0819 110/79 -- -- (!) 30 21 100 % --   25 0815 115/81 97.6  F (36.4  C) Oral 93 -- 99 % --   25 0751 115/ 97.4  F (36.3  C) Oral 93 20 97 % --   25 0554 -- -- -- -- -- -- 75.3 kg (166 lb 0.1 oz)   25 0100 115/ 98.1  F (36.7  C) Oral 95 21 97 % --   25 115/82 97.5  F (36.4  C) Oral 104 20 97 % --       Temp:  [97.4  F (36.3  C)-98.1  F (36.7  C)] 97.6  F (36.4  C)  Pulse:  [] 83  Resp:  [20-21] 21  BP: (108-116)/(76-86) 116/79  SpO2:  [97 %-100 %] 98 %    Temperatures:  Current - Temp: 97.6  F (36.4  C); Max - Temp  Av.7  F (36.5  C)  Min: 97.4  F (36.3  C)  Max: 98.1  F (36.7  C)  Respiration range: Resp  Av.5  Min: 20  Max: 21  Pulse range: Pulse  Av.5  Min: 30  Max: 118  Blood pressure range: Systolic (24hrs), Av , Min:108 , Max:116    ; Diastolic (24hrs), Av, Min:76, Max:86    Pulse oximetry range: SpO2  Av %  Min: 97 %  Max: 100 %    I/O last 3 completed shifts:  In: 243 [P.O.:240; I.V.:3]  Out: 25 [Urine:25]      Intake/Output Summary (Last 24 hours) at 2025 0957  Last data filed at 2025 0800  Gross per 24 hour   Intake 363 ml   Output 25 ml   Net 338 ml       Resting comfortably in bed  Right CVC with no redness or tenderness       Wt Readings from Last 4 Encounters:   25 75.3 kg (166 lb 0.1 oz)   25 75.8 kg (167 lb)   25 78.9 kg (174 lb)   25 78.9 kg (173 lb 14.4 oz)          Data:          Lab Results   Component Value Date     2025     2025     2025    Lab Results   Component Value Date    CHLORIDE 96 2025    CHLORIDE 93 2025    CHLORIDE 96 2025    Lab Results   Component Value Date    BUN 27.9 2025    BUN 23.3 2025    BUN 45.3 2025      Lab Results   Component Value Date    POTASSIUM 4.8 2025    POTASSIUM 4.5 2025    POTASSIUM 6.3 2025    Lab Results   Component Value Date    CO2 29 2025    CO2 27 2025    CO2 21 2025    Lab Results   Component Value Date    CR 3.88 2025    CR 3.33 2025    CR 5.13 2025        Recent Labs   Lab Test 25  0823 25  0703 25  0642   WBC 8.9 11.6* 11.4*   HGB 6.3* 7.2* 8.2*   HCT 21.3* 23.0* 26.3*   MCV 97 94 94    456* 402     Recent Labs   Lab Test 25  1517 25  1852 25  1606   AST 12 12 17   ALT 6 8 14   ALKPHOS 115 101 246*   BILITOTAL 0.2 0.3 0.2       Recent Labs   Lab Test 25  0823 25  0703 25  0642   MAG 2.0 2.0 2.2     Recent Labs   Lab Test 25  0823 25  0703 25  0642   PHOS 3.9 4.3 5.7*     Recent Labs   Lab Test 25  0823 25  0703 25  0642   TERENCE 8.6* 8.7* 9.0       Lab Results   Component Value Date    TERENCE 8.6 (L) 2025     Lab Results    Component Value Date    WBC 8.9 02/27/2025    HGB 6.3 (LL) 02/27/2025    HCT 21.3 (L) 02/27/2025    MCV 97 02/27/2025     02/27/2025     Lab Results   Component Value Date     (L) 02/27/2025    POTASSIUM 4.8 02/27/2025    CHLORIDE 96 (L) 02/27/2025    CO2 29 02/27/2025     (H) 02/27/2025     Lab Results   Component Value Date    BUN 27.9 (H) 02/27/2025    CR 3.88 (H) 02/27/2025     Lab Results   Component Value Date    MAG 2.0 02/27/2025     Lab Results   Component Value Date    PHOS 3.9 02/27/2025       Creatinine   Date Value Ref Range Status   02/27/2025 3.88 (H) 0.67 - 1.17 mg/dL Final   02/26/2025 3.33 (H) 0.67 - 1.17 mg/dL Final   02/25/2025 5.13 (H) 0.67 - 1.17 mg/dL Final   02/24/2025 4.30 (H) 0.67 - 1.17 mg/dL Final   02/23/2025 3.20 (H) 0.67 - 1.17 mg/dL Final   02/22/2025 4.16 (H) 0.67 - 1.17 mg/dL Final       Attestation:  I have reviewed today's vital signs, notes, medications, labs and imaging.  Seen on dialysis.     Jimbo Foreman MD

## 2025-02-27 NOTE — PROGRESS NOTES
DIALYSIS PROCEDURE NOTE      Patient dialyzed for 3 hrs. on a K3 bath with a net fluid removal of  2L.  A BFR of 400 ml/min was obtained via a CVC   The treatment plan was discussed with Dr. Vaca during the treatment.    Total heparin received during the treatment: 2000 units.   Line flushed, clamped and capped with heparin 1:1000 1.6 mL (1600 units) per lumen    Meds  given: EPO/IRON   Complications: none      Person educated: patient. Barriers to learning: none. Educated on procedure via verbal mode. Patient verbalized understanding.     ICEBOAT    I: Patient was identified using 2 identifiers  C:  Consent Signed Yes  E: Equipment preventative maintenance is current and dialysis delivery system OK to use  B: Hepatitis B Surface result    Latest Reference Range & Units 02/20/25 09:35 02/26/25 20:02   Hep B Surface Agn Nonreactive  Nonreactive Nonreactive   Hepatitis B Surface Antibody Instrument Value <8.5 m[IU]/mL 3.92    Hepatitis B Surface Antibody  Nonreactive      O: Dialysis orders present and complete prior to treatment  A: Vascular access verified and assessed prior to treatment  T: Treatment was performed at a clinically appropriate time  ?: Patient was allowed to ask questions and address concerns prior to treatment  Machine water alarm in place and functioning. Transducer pods intact and checked every 15min.   Pt returned via bed.  Chlorine/Chloramine water system checked every 4 hours.  Outpatient Dialysis at D    Patient repositioned every 2 hours during the treatment.  Post treatment report given

## 2025-02-27 NOTE — PROGRESS NOTES
Care Management Follow Up    Length of Stay (days): 37    Expected Discharge Date: 02/27/2025     Concerns to be Addressed: discharge planning     Patient plan of care discussed at interdisciplinary rounds: Yes    Anticipated Discharge Disposition: Skilled Nursing Facility      Anticipated Discharge Services:    Anticipated Discharge DME:      Patient/family educated on Medicare website which has current facility and service quality ratings: yes  Education Provided on the Discharge Plan:    Patient/Family in Agreement with the Plan: yes    Referrals Placed by CM/SW: [facility] Line, Post Acute Facilities, Transportation  Private pay costs discussed: transportation costs    Discussed  Partnership in Safe Discharge Planning  document with patient/family: Yes:     Handoff Completed: Yes, non-[facility] PCP: External handoff communication completed    Additional Information:  Writer completed PAS in anticipation of discharge. Confirming if [facility] has oxygen available for transport/dialysis     Writer spoke to patient about discharge plan and cost of rides to and from dialysis being over $300 per week until he is moving better and able to arrange his own rides.     PAS-RR    D: Per DHS regulation, SW completed and submitted PAS-RR to [place] [employer] Direct Connect via the [facility] Line.  PAS-RR confirmation # is : [id number]    P: Further questions may be directed to [facility] Line at #[phone], option #4 for PAS-RR staff.     Next Steps: Orders and confirmation of medical readiness to discharge     Confirm with [facility]  if they have oxygen if needed       ADD - Oxygen is not a barrier facility will have a portable     Writer called [facility] with new chair time of 7:50 AM and they are able to accommodate this going forward  at 7 AM tomorrow and 7:15 AM going forward       ADD - Patient is not ready to discharge today     Rescheduled  discharge to tomorrow and canceled the dialysis transport run for Friday with Transport  [facility]  they will be ready to take him Monday from [facility] to  [address] [place] [address] [address] [place] [address].   Dialysis center notified     Writer arranged tentative transport using [employer] stretcher, patient aware of costs, ride time is for between 1540 - 1620 FRIDAY            KATELYN Duarte

## 2025-02-27 NOTE — PROGRESS NOTES
[facility]    Medicine Progress Note - Hospitalist Service    Date of Admission:  1/21/2025    Assessment & Plan    Arnulfo Pritchett is a 64 year old male with past medical history of severe heart failure (EF 30%), ESRD (on peritoneal dialysis), chronic paroxysmal AF on warfarin, LLE PAD s/p endarterectomy, RCC s/p nephrectomy admitted on 1/21/2025 for septic shock secondary to right leg PAD with worsening right heel necrotic ulcer. The patient was seen in ED at outside hospital on 1/3/25 and diagnosed with pneumonia, later completing course of Cefdinir. He then presented on 1/21/25 to outside hospital for right leg pain and found to have right leg ulcer and transferred to [facility] for vascular surgery evaluation. Now s/p right common femoral BK popliteal/tibial endarterectomy and right common femoral to below-knee popliteal/tibial PTFE bypass performed on 1/27/25. The patient was admitted to the ICU for requirement of mechanical ventilation and pressor support following surgery for multifactorial shock.       Hospitalist service consulted 1/31/2025 for transfer out of ICU and to assist with medical management along with vascular surgery, podiatry and Infectious disease.     Hx PAD of LLE s/p endarterectomy and fem-tibioperoneal trunk bypass on 10/25/24  Hx RLE arterial occlusion s/p SFA angioplasty and stent 5/2024  PAD RLE, right heel gangrene, occluded SFA, no evidence of osteomyelitis  s/p right common femoral and popliteal/tibial endarterectomy, right femoral to popliteal/tibial PTFE bypass 1/27/25   # Right groin hematoma resolving   -Found to have critical limb ischemia on admission on January 21, for details see admission note.   -Distributive shock following surgery followed by the ICU service until 1/31.  -Followed by vascular surgery, podiatry, wound care, and ID.  -Coumadin has been held since admission, was was started on IV heparin drip since admission.  -Restarted warfarin on  2/14/2025 after discussion with vascular surgery.   heparin gtt discontinued.  -Has been maintained on statin and Plavix 75 mg daily during this hospital stay.  -Recent IV piperacillin/tazobactam (Zosyn), discontinued 2/2 per ID after 12 days of Zosyn; follow-up cultures, monitor off antibiotics.  -Wound care per surgery and WOC.  -pain control, see hospital orders.  -worsening ischemic changes in 2nd-5th toes of right foot.  -Underwent transmetatarsal amputation on 2/11/2025.  Tolerated procedure well.  Intraoperatively no signs of infection.  No further surgery per podiatry.  -Leukocytosis improved postsurgery.  Hemoglobin trended down to 7 on 2/13/2025 requiring transfusion, Hgb stable since then.  Patient states since most recent surgery he has not been ambulatory requiring assist for mobility.  2/27/2025, see vascular surgery note:  Drainage reported from right groin wound yesterday, though no drainage appreciated on exam today.  Debrided the eschar from the distal half of the incision and dressed with Aquacel which can be changed daily. No evidence of deep infection.  - Continue plavix and warfarin  - awaiting discharge to TCU      Multifactorial shock, resolved  ICU admission, weaned off vasopressors 1/30.  -Transitioned to midodrine with increase in prior to admission dose on1/31.  -Monitor vitals  2/25/2025.  See vascular surgery note 2/24/2025 okay to discharge per vascular surgery.     End-stage renal disease (ESRD), on peritoneal dialysis  Hx of secondary hyperparathyroidism and hyperphosphatemia, phosphorous elevated above baseline of 6 at most recent of 7.9  R plueral effusion, status post thoracentesis 2/24/2025  Nephrology following.  -Chronic peritoneal dialysis for the last 3.5 years. Patient expressed interest to switch to HD. Nephrology discussed with his primary nephrologist who agrees to transition to hemodialysis.  Underwent right tunneled IJ catheter placement by IR on 2/12/2025.  Now  undergoing HD on MWF schedule.  -Continue midodrine.  -Peritoneal dialysis catheter removed 12/19 by vascular surgery  -2/25/2025.  1.7 L removed R thoracentesis 2/24/2025.  Potassium 6.3 , PD on low potassium bath today.  No fluid removed.  Recheck potassium tomorrow, PD on TTS schedule.  Fluid and electrolyte management per PD/nephrology.  Started on low potassium diet.  Telemetry for hyperkalemia  2/26/2025, potassium 4.5 on low potassium diet.   2/27/2025, dialysis yesterday, no reported complications.  Drop in hemoglobin today 6.3, no active S/S bleeding.  Pain related to I&D performed yesterday.  Transfuse 1 Unit.  Repeat hemoglobin in a.m., monitor right groin wound.  Currently no signs of hematoma.  This delayed plan discharged with transfusion, recheck hemoglobin in a.m., monitor right groin.  Likely discharge as planned tomorrow to TCU with outpatient dialysis.        acute hypoxemic respiratory failure, resolved  Right pleural effusion  Status post thoracentesis 1/24/25, repeat 2/2/25  -Following surgery on admission by vascular surgery was intubated.  Recent pneumonia treated with 3 weeks of Ceftin prior to this admission. Extubated 1/29/25  -Right pleural effusion.  Status post thoracentesis 1/24/25 with 1.5 L clear yellow fluid removed, likely transudative based on low cell count of 117, , and protein of 1.7. Possibly secondary to reduced peritoneal dialysis during hospitalization vs underlying severe HFrEF (30%).  -CT chest 2/1/25, see report, no clear pneumonia; large right pleural effusion noted.  -Now off oxygen. Encourage incentive spirometry.  -Antibiotics as above, discontinued by ID 2/2  -ID consulted 2/1 as above, recommend monitoring patient off antibiotics  -S/p repeat Right thoracentesis on 2/2 with 2.3 L of rolan fluid removed; additional studies, cultures (negative to date), cytology (negative for malignancy)   -repeat Cxr showing pleural effusion with SOB  1.7 L removed  thoracentesis 2/24/2025.  Monitor in TCU     Paroxysmal atrial fibrillation  Chronic anticoagulation prior to admission, warfarin   Ischemic cardiomyopathy with HLD, CAD s/p multiple stents, and severe HFrEF (30%)   Troponin elevation, likely type 2 MI  Wide complex tachycardia 2/1/25  Assessment-postoperatively has been maintained on heparin drip.  Coumadin held then restarted postop  Pharmacy dosing warfarin.  -Echo 1/31-EF 25 to 30%.  Severe global hypokinesis with abnormal septal motion consistent with conduction abnormality.  Severe inferior wall hypokinesis.  LVH.  RV mildly dilated and mildly reduced RV function.  Mild mitral regurgitation.  Mild to moderate mitral stenosis.  Moderate aortic stenosis.  Left ventricular fairly pressures elevated.  -Episode of wide-complex tachycardia noted by nursing staff, 15 beats, up on 2/1/2025.  -Continue inpatient telemetry.  -EP consulted on 2/16 given tachycardia with rates to 120s. They evaluated patient and said not unexpected given patient's severe HFrEF.              - Recommended increasing Toprol XL to 12.5 mg BID (PTA was on 25 mg daily)          # Confusion  # Metabolic encephalopathy resolved  -Patient refused MRI scan        Anemia of chronic disease  Baseline around 10.  Has been stable in the 8 range. Did need 1 U PRBC on 2/8 and 2/13 for Hgb<7 without signs of active bleeding.  -Nephrology started patient on EPO on 2/9 and to continue weekly.  -See above.  Monitoring right groin for hematoma after I&D 2/26/2025.        # Hyponatremia  Hyperkalemia  Continue hemodialysis        Hyperglycemia  Type 2 diabetes   hemoglobin A1c 5.7% October 24  PTA regimen: Lantus 35 units at bedtime  Insulin requirements have been decreasing and he started having hypoglycemia episodes after being started on HD on 2/13/2025  Discontinue Lantus and carb coverage  Continue sliding scale insulin. Discharge insulin dosing dependent on requirements from sliding scale.  Continue to  monitor FSBS        Mixed anion gap acidosis, resolved  -Improved respiratory function, monitor.  Anion gap resolved 1/31.     Abdominal discomfort 1/30, resolved  Noted to have some right upper quadrant pain 1/30.  Now resolved.  LFTs normal.  Was on Zosyn for above surgical issues.   -Monitor abdominal exam.  -Antibiotics managed as noted above.     severe protein calorie nutrition in the context of acute on chronic illness or injury   -Nutrition consulted.  Nutrition supplement.  Low potassium.  -Speech and language therapy (SLP) consulted, diet per speech therapy.     Gout  -PTA allopurinol resolved and changed to dialysis dosing 100 mg 3 times weekly     History of renal cell carcinoma   # Patient had CTA done in in October 2024 which showed a 4 mm hypoenhancing nodule within the lower part of the left kidney which radiology said that might represent a small primary renal cell carcinoma.  Patient has been aware of this findings and did not follow-up with outpatient  CT abdomen pelvis done in July 2025 showed cyst in the left kidney  -Did not want workup with urology but per prior hospitalist 2/20/2025 he said that he is open to seeing urology at [facility]  -Case management consulted        -Status post right nephrectomy, monitor.     History of obstructive sleep apnea.  By report, intolerant of CPAP   -Monitor, follow-up with outpatient provider.     Weakness and deconditioning  -PT, OT, speech and language therapy (SLP) consulted.  -Increase activity as tolerated.     Disposition  -Anticipated discharge to transitional care unit with dialysis facility or transport.       # Goals of care were discussed with the patient and prior hospitalist on 2/20/25 and he does not want to be full code and does not want ribs to be broken or be on the ventilator and code status changed to DNR            Diet: Advance Diet as Tolerated: Regular Diet Adult; Regular Diet Adult; 2 gm K Diet    DVT Prophylaxis: IV heparin  Rosario  Catheter: Not present  Lines: PRESENT      CVC Double Lumen Right Internal jugular Non - valved (open ended);Tunneled-Site Assessment: WDL      Cardiac Monitoring: ACTIVE order. Indication: Electrolyte Imbalance (24 hours)- Magnesium <1.3 mg/ml; Potassium < =2.8 or > 5.5 mg/ml  Code Status: No CPR- Do NOT Intubate      Clinically Significant Risk Factors         # Hyponatremia: Lowest Na = 130 mmol/L in last 2 days, will monitor as appropriate  # Hypochloremia: Lowest Cl = 93 mmol/L in last 2 days, will monitor as appropriate      # Hypoalbuminemia: Lowest albumin = 1.6 g/dL at 2/12/2025  7:26 AM, will monitor as appropriate     # Hypertension: Noted on problem list    # Chronic heart failure with reduced ejection fraction: last echo with EF <40%          # DMII: A1C = 6.6 % (Ref range: <5.7 %) within past 6 months    # Severe Malnutrition: based on nutrition assessment      # Financial/Environmental Concerns:           Social Drivers of Health    Tobacco Use: Medium Risk (2/19/2025)    Patient History     Smoking Tobacco Use: Former     Smokeless Tobacco Use: Never   Alcohol Use: Unknown (11/28/2018)    [facility], [facility]    AUDIT-C     Frequency of Alcohol Consumption: Monthly or less     Frequency of Binge Drinking: Never   Transportation Needs: High Risk (1/21/2025)    Transportation Needs     Within the past 12 months, has lack of transportation kept you from medical appointments, getting your medicines, non-medical meetings or appointments, work, or from getting things that you need?: Yes   Physical Activity: Unknown (10/8/2024)    Exercise Vital Sign     Days of Exercise per Week: 0 days   Social Connections: Unknown (10/8/2024)    Social Connection and Isolation Panel [NHANES]     Frequency of Social Gatherings with Friends and Family: Patient declined          Disposition Plan     Medically Ready for Discharge:  Anticipated Tomorrow to TCU with dialysis access pending hemoglobin stability, evaluating right wound for hematoma.     Lynette Fox MD  Hospitalist Service  [facility]  Securely message with [employer] (more info)  Text page via [facility] Paging/Directory       ______________________________________________________________________    Interval History   Anemia as above.  No dyspnea lightheadedness.  No groin complaints, nursing notes no hematoma, I&D yesterday right groin..     Physical Exam   Vital Signs: Temp: 97.5  F (36.4  C) Temp src: Oral BP: 127/87 Pulse: 108   Resp: 20 SpO2: 98 % O2 Device: Nasal cannula Oxygen Delivery: 1 LPM  Weight: 166 lbs .1 oz    General/Constitutional:   NAD, calm, cooperative  Chest/Respiratory: Respirations nonlabored room air  Cardiovascular:  LE edema none  Gastrointestinal/Abdomen:  soft, nontender,   Neuro.  Gross motor tested, nonfocal,  Psych affect calm..

## 2025-02-28 LAB
ANION GAP SERPL CALCULATED.3IONS-SCNC: 9 MMOL/L (ref 7–15)
BUN SERPL-MCNC: 21.8 MG/DL (ref 8–23)
CALCIUM SERPL-MCNC: 9.3 MG/DL (ref 8.8–10.4)
CHLORIDE SERPL-SCNC: 98 MMOL/L (ref 98–107)
CREAT SERPL-MCNC: 3.07 MG/DL (ref 0.67–1.17)
EGFRCR SERPLBLD CKD-EPI 2021: 22 ML/MIN/1.73M2
ERYTHROCYTE [DISTWIDTH] IN BLOOD BY AUTOMATED COUNT: 22.6 % (ref 10–15)
GLUCOSE BLDC GLUCOMTR-MCNC: 106 MG/DL (ref 70–99)
GLUCOSE BLDC GLUCOMTR-MCNC: 122 MG/DL (ref 70–99)
GLUCOSE BLDC GLUCOMTR-MCNC: 128 MG/DL (ref 70–99)
GLUCOSE BLDC GLUCOMTR-MCNC: 131 MG/DL (ref 70–99)
GLUCOSE BLDC GLUCOMTR-MCNC: 141 MG/DL (ref 70–99)
GLUCOSE BLDC GLUCOMTR-MCNC: 93 MG/DL (ref 70–99)
GLUCOSE SERPL-MCNC: 136 MG/DL (ref 70–99)
HCO3 SERPL-SCNC: 27 MMOL/L (ref 22–29)
HCT VFR BLD AUTO: 28.2 % (ref 40–53)
HGB BLD-MCNC: 8.8 G/DL (ref 13.3–17.7)
INR PPP: 2.06 (ref 0.85–1.15)
MAGNESIUM SERPL-MCNC: 2 MG/DL (ref 1.7–2.3)
MCH RBC QN AUTO: 29.3 PG (ref 26.5–33)
MCHC RBC AUTO-ENTMCNC: 31.2 G/DL (ref 31.5–36.5)
MCV RBC AUTO: 94 FL (ref 78–100)
PHOSPHATE SERPL-MCNC: 3.4 MG/DL (ref 2.5–4.5)
PLATELET # BLD AUTO: 456 10E3/UL (ref 150–450)
POTASSIUM SERPL-SCNC: 5.3 MMOL/L (ref 3.4–5.3)
PROCALCITONIN SERPL IA-MCNC: 0.93 NG/ML
RBC # BLD AUTO: 3 10E6/UL (ref 4.4–5.9)
SODIUM SERPL-SCNC: 134 MMOL/L (ref 135–145)
WBC # BLD AUTO: 12.4 10E3/UL (ref 4–11)

## 2025-02-28 PROCEDURE — 90935 HEMODIALYSIS ONE EVALUATION: CPT | Performed by: INTERNAL MEDICINE

## 2025-02-28 PROCEDURE — 85610 PROTHROMBIN TIME: CPT

## 2025-02-28 PROCEDURE — 258N000003 HC RX IP 258 OP 636: Performed by: INTERNAL MEDICINE

## 2025-02-28 PROCEDURE — 120N000001 HC R&B MED SURG/OB

## 2025-02-28 PROCEDURE — 85014 HEMATOCRIT: CPT | Performed by: STUDENT IN AN ORGANIZED HEALTH CARE EDUCATION/TRAINING PROGRAM

## 2025-02-28 PROCEDURE — 84145 PROCALCITONIN (PCT): CPT | Performed by: HOSPITALIST

## 2025-02-28 PROCEDURE — 250N000013 HC RX MED GY IP 250 OP 250 PS 637

## 2025-02-28 PROCEDURE — 250N000009 HC RX 250: Performed by: STUDENT IN AN ORGANIZED HEALTH CARE EDUCATION/TRAINING PROGRAM

## 2025-02-28 PROCEDURE — 83735 ASSAY OF MAGNESIUM: CPT

## 2025-02-28 PROCEDURE — 36415 COLL VENOUS BLD VENIPUNCTURE: CPT | Performed by: STUDENT IN AN ORGANIZED HEALTH CARE EDUCATION/TRAINING PROGRAM

## 2025-02-28 PROCEDURE — 80048 BASIC METABOLIC PNL TOTAL CA: CPT

## 2025-02-28 PROCEDURE — 250N000011 HC RX IP 250 OP 636: Performed by: INTERNAL MEDICINE

## 2025-02-28 PROCEDURE — 634N000001 HC RX 634: Mod: JZ | Performed by: INTERNAL MEDICINE

## 2025-02-28 PROCEDURE — 99232 SBSQ HOSP IP/OBS MODERATE 35: CPT | Performed by: HOSPITALIST

## 2025-02-28 PROCEDURE — 84100 ASSAY OF PHOSPHORUS: CPT

## 2025-02-28 PROCEDURE — 80051 ELECTROLYTE PANEL: CPT

## 2025-02-28 PROCEDURE — 250N000013 HC RX MED GY IP 250 OP 250 PS 637: Performed by: STUDENT IN AN ORGANIZED HEALTH CARE EDUCATION/TRAINING PROGRAM

## 2025-02-28 PROCEDURE — 82565 ASSAY OF CREATININE: CPT

## 2025-02-28 PROCEDURE — 90937 HEMODIALYSIS REPEATED EVAL: CPT

## 2025-02-28 RX ORDER — WARFARIN SODIUM 2 MG/1
2 TABLET ORAL
Status: COMPLETED | OUTPATIENT
Start: 2025-02-28 | End: 2025-02-28

## 2025-02-28 RX ORDER — ALBUMIN (HUMAN) 12.5 G/50ML
50 SOLUTION INTRAVENOUS
Status: DISCONTINUED | OUTPATIENT
Start: 2025-02-28 | End: 2025-03-01 | Stop reason: HOSPADM

## 2025-02-28 RX ADMIN — EPOETIN ALFA-EPBX 10000 UNITS: 10000 INJECTION, SOLUTION INTRAVENOUS; SUBCUTANEOUS at 15:40

## 2025-02-28 RX ADMIN — CINACALCET 60 MG: 30 TABLET ORAL at 20:34

## 2025-02-28 RX ADMIN — ALLOPURINOL 100 MG: 100 TABLET ORAL at 09:02

## 2025-02-28 RX ADMIN — MIDODRINE HYDROCHLORIDE 10 MG: 2.5 TABLET ORAL at 21:13

## 2025-02-28 RX ADMIN — HEPARIN SODIUM 1600 UNITS: 1000 INJECTION INTRAVENOUS; SUBCUTANEOUS at 15:40

## 2025-02-28 RX ADMIN — SEVELAMER CARBONATE 800 MG: 800 TABLET, FILM COATED ORAL at 13:22

## 2025-02-28 RX ADMIN — MIDODRINE HYDROCHLORIDE 10 MG: 2.5 TABLET ORAL at 09:02

## 2025-02-28 RX ADMIN — ATORVASTATIN CALCIUM 40 MG: 40 TABLET, FILM COATED ORAL at 20:35

## 2025-02-28 RX ADMIN — Medication 5 MG: at 20:34

## 2025-02-28 RX ADMIN — CALCITRIOL CAPSULES 0.25 MCG 0.25 MCG: 0.25 CAPSULE ORAL at 21:06

## 2025-02-28 RX ADMIN — ALBUTEROL SULFATE 2.5 MG: 2.5 SOLUTION RESPIRATORY (INHALATION) at 23:44

## 2025-02-28 RX ADMIN — CLOPIDOGREL BISULFATE 75 MG: 75 TABLET ORAL at 20:35

## 2025-02-28 RX ADMIN — WARFARIN SODIUM 2 MG: 2 TABLET ORAL at 21:14

## 2025-02-28 RX ADMIN — PANTOPRAZOLE SODIUM 40 MG: 40 TABLET, DELAYED RELEASE ORAL at 09:02

## 2025-02-28 RX ADMIN — SODIUM CHLORIDE 500 ML: 9 INJECTION, SOLUTION INTRAVENOUS at 15:38

## 2025-02-28 RX ADMIN — HEPARIN SODIUM 1600 UNITS: 1000 INJECTION INTRAVENOUS; SUBCUTANEOUS at 15:39

## 2025-02-28 RX ADMIN — SEVELAMER CARBONATE 800 MG: 800 TABLET, FILM COATED ORAL at 10:06

## 2025-02-28 RX ADMIN — SENNOSIDES AND DOCUSATE SODIUM 1 TABLET: 50; 8.6 TABLET ORAL at 20:35

## 2025-02-28 RX ADMIN — SEVELAMER CARBONATE 800 MG: 800 TABLET, FILM COATED ORAL at 21:06

## 2025-02-28 RX ADMIN — ACETAMINOPHEN 975 MG: 325 TABLET, FILM COATED ORAL at 13:21

## 2025-02-28 RX ADMIN — Medication 1 CAPSULE: at 12:26

## 2025-02-28 RX ADMIN — ALBUTEROL SULFATE 2.5 MG: 2.5 SOLUTION RESPIRATORY (INHALATION) at 10:11

## 2025-02-28 RX ADMIN — METOPROLOL SUCCINATE 12.5 MG: 25 TABLET, EXTENDED RELEASE ORAL at 20:34

## 2025-02-28 RX ADMIN — METOPROLOL SUCCINATE 12.5 MG: 25 TABLET, EXTENDED RELEASE ORAL at 09:02

## 2025-02-28 ASSESSMENT — ACTIVITIES OF DAILY LIVING (ADL)
ADLS_ACUITY_SCORE: 72
ADLS_ACUITY_SCORE: 76
ADLS_ACUITY_SCORE: 72
ADLS_ACUITY_SCORE: 76
ADLS_ACUITY_SCORE: 72
ADLS_ACUITY_SCORE: 76
ADLS_ACUITY_SCORE: 72
ADLS_ACUITY_SCORE: 76

## 2025-02-28 NOTE — PROGRESS NOTES
"Pt declined offered hospital CPAP machine. Pt state that \"he never CPAP before\".     Balwinder Madison, RT on 2/27/2025 at 10:36 PM    "

## 2025-02-28 NOTE — PLAN OF CARE
2/27/25    [id number]    Orientation: A/Ox4    Vitals/Tele: VSS, except tachy    IV Access/drains: RPIV, LPIV, Dialysis port    Diet: Reg, 2 gm potassium     Mobility: Ax2, lift; T&Rq2, refuses repositioning    GI/: Incontinent of bowel, urinal @ bedside    Wound/Skin:  RLE incision sites CDI, R foot amputation ace wrap and gauze in place, CDI, black heel/malleolus wounds. R groin site w/ hematoma. Peritoneal site CDI    Consults: Pt, OT    Discharge Plan: Today to TCU    Other: Pt has been refusing repositioning       See Flow sheets for assessment

## 2025-02-28 NOTE — PROGRESS NOTES
Assessment and Plan:     End-stage renal disease: Seen on dialysis.  We are running for 3 hours with a right CVC at 400 blood flow rate.  1-2 L ultrafiltration limited by his hypotension.  We will use a 3 K 35 bicarb 140 sodium.  EPO on the run, no heparin.    His next run will be on Monday unless he is discharged, in that case he can run as an outpatient.            Interval History:   Bilateral lower extremity vascular disease status post multiple interventions.    Right pleural effusion status postthoracentesis on 2 occasions.    Atrial fibrillation.                Review of Systems:   Patient is somnolent and minimally responsive during the run.          Medications:     Current Facility-Administered Medications   Medication Dose Route Frequency Provider Last Rate Last Admin    - MEDICATION INSTRUCTIONS for Dialysis Patients -   Does not apply See Admin Instructions Evelia Appiah PA-C        acetaminophen (TYLENOL) tablet 975 mg  975 mg Oral Q8H Evelia Appiah PA-C   975 mg at 02/28/25 1321    allopurinol (ZYLOPRIM) tablet 100 mg  100 mg Oral Once per day on Monday Wednesday Friday Elizabeth Slater MD   100 mg at 02/28/25 0902    [Held by provider] allopurinol (ZYLOPRIM) tablet 200 mg  200 mg Oral QPM Elizabeth Slater MD   200 mg at 01/26/25 1957    atorvastatin (LIPITOR) tablet 40 mg  40 mg Oral or Feeding Tube At Bedtime Evelia Appiah PA-C   40 mg at 02/27/25 2053    calcitRIOL (ROCALTROL) capsule 0.25 mcg  0.25 mcg Oral At Bedtime Evelia Appiah PA-C   0.25 mcg at 02/27/25 2055    cinacalcet (SENSIPAR) tablet 60 mg  60 mg Oral Q Mon Wed Fri AM Evelia Appiah PA-C   60 mg at 02/26/25 2038    clopidogrel (PLAVIX) tablet 75 mg  75 mg Oral or Feeding Tube QPM Evelia Appiah PA-C   75 mg at 02/27/25 2053    epoetin evaristo-epbx (RETACRIT) injection 10,000 Units  10,000 Units Intravenous Once in dialysis/CRRT Vega Guzman MD        sodium chloride 0.9% DIALYSIS Cath  LOCK - RED Lumen  10 mL Intracatheter Once in dialysis/CRRT Vega Guzman MD        Followed by    heparin 1000 unit/mL DIALYSIS Cath LOCK - RED Lumen  1.3-2.6 mL Intracatheter Once in dialysis/CRRT Vega Guzman MD        sodium chloride 0.9% DIALYSIS Cath LOCK - BLUE Lumen  10 mL Intracatheter Once in dialysis/CRRT Vega Guzman MD        Followed by    heparin 1000 unit/mL DIALYSIS Cath LOCK -BLUE Lumen  1.3-2.6 mL Intracatheter Once in dialysis/CRRT Vega Guzman MD        insulin aspart (NovoLOG) injection (RAPID ACTING)  1-7 Units Subcutaneous TID AC Evelia Appiah, PA-C   1 Units at 02/28/25 1226    insulin aspart (NovoLOG) injection (RAPID ACTING)  1-5 Units Subcutaneous At Bedtime Evelia Appiah, PA-C   1 Units at 02/07/25 0040    melatonin tablet 5 mg  5 mg Oral At Bedtime Evelia Appiah, PA-C   5 mg at 02/27/25 2053    metoprolol succinate ER (TOPROL-XL) 24 hr half-tab 12.5 mg  12.5 mg Oral BID Evelia Appiah E, PA-C   12.5 mg at 02/28/25 0902    midodrine (PROAMATINE) tablet 10 mg  10 mg Oral BID w/meals Evelia Appiah, PA-C   10 mg at 02/28/25 0902    multivitamin RENAL (TRIPHROCAPS) capsule 1 capsule  1 capsule Oral Daily Evelia Appiah, PA-C   1 capsule at 02/28/25 1226    No heparin via hemodialysis machine   Does not apply Once Vega Guzman MD        pantoprazole (PROTONIX) EC tablet 40 mg  40 mg Oral QAM AC Evelia Appiah E, PA-C   40 mg at 02/28/25 0902    polyethylene glycol (MIRALAX) Packet 17 g  17 g Oral Daily Evelia Appiah, PA-C   17 g at 02/21/25 0840    senna-docusate (SENOKOT-S/PERICOLACE) 8.6-50 MG per tablet 1 tablet  1 tablet Oral BID Evelia Appiah E, PA-C   1 tablet at 02/27/25 2053    sevelamer carbonate (RENVELA) tablet 800 mg  800 mg Oral TID w/meals Evelia Appiah PA-C   800 mg at 02/28/25 1322    sodium chloride (PF) 0.9% PF flush 3 mL  3 mL Intracatheter Q8H Evelia Appiah PA-C   3 mL at 02/28/25 0908    sodium chloride (PF) 0.9% PF flush 9 mL  9 mL Intracatheter  During Dialysis/CRRT (from stock) Vega Guzman MD        sodium chloride (PF) 0.9% PF flush 9 mL  9 mL Intracatheter During Dialysis/CRRT (from stock) Vega Guzman MD        sodium chloride 0.9% BOLUS 200 mL  200 mL Hemodialysis Machine Once Vega Guzman MD        sodium chloride 0.9% BOLUS 250 mL  250 mL Intravenous Once in dialysis/CRRT Vega Guzman MD        sodium chloride 0.9% BOLUS 500 mL  500 mL Hemodialysis Machine Once Vega Guzman MD        warfarin ANTICOAGULANT (COUMADIN) tablet 2 mg  2 mg Oral ONCE at 18:00 Melissa Macias MD        Warfarin Dose Required Daily - Pharmacist Managed  1 each Oral See Admin Instructions Jersey Maki MD         Current Facility-Administered Medications   Medication Dose Route Frequency Provider Last Rate Last Admin    dextrose 10% infusion   Intravenous Continuous PRN Evelia Appiah PA-C        Patient is already receiving anticoagulation with heparin, enoxaparin (LOVENOX), warfarin (COUMADIN)  or other anticoagulant medication   Does not apply Continuous PRN Evelia Appiah PA-C         Current active medications and PTA medications reviewed, see medication list for details.            Physical Exam:   Vitals were reviewed  Patient Vitals for the past 24 hrs:   BP Temp Temp src Pulse Resp SpO2   02/28/25 1515 93/69 -- -- 88 -- --   02/28/25 1500 102/75 -- -- 88 -- 100 %   02/28/25 1445 105/77 -- -- 94 -- 100 %   02/28/25 1430 107/76 -- -- 92 20 --   02/28/25 1426 107/77 -- -- 97 20 --   02/28/25 1420 108/78 97.7  F (36.5  C) Oral 100 20 96 %   02/28/25 0740 129/89 97.2  F (36.2  C) Oral 107 20 97 %   02/28/25 0335 (!) 134/100 -- -- -- -- --   02/28/25 0205 112/82 97.8  F (36.6  C) Oral -- 20 97 %   02/27/25 2049 119/85 98.2  F (36.8  C) Oral 107 -- 99 %       Temp:  [97.2  F (36.2  C)-98.2  F (36.8  C)] 97.7  F (36.5  C)  Pulse:  [] 88  Resp:  [20] 20  BP: ()/() 93/69  Cuff Mean (mmHg):  [89] 89  SpO2:  [96 %-100 %] 100  %    Temperatures:  Current - Temp: 97.7  F (36.5  C); Max - Temp  Av.7  F (36.5  C)  Min: 97.2  F (36.2  C)  Max: 98.2  F (36.8  C)  Respiration range: Resp  Av  Min: 20  Max: 20  Pulse range: Pulse  Av.6  Min: 88  Max: 107  Blood pressure range: Systolic (24hrs), Av , Min:93 , Max:134   ; Diastolic (24hrs), Av, Min:69, Max:100    Pulse oximetry range: SpO2  Av.2 %  Min: 96 %  Max: 100 %    No intake/output data recorded.    No intake or output data in the 24 hours ending 25 1534    No distress  Right CVC with no redness or tenderness         Wt Readings from Last 4 Encounters:   25 75.3 kg (166 lb 0.1 oz)   25 75.8 kg (167 lb)   25 78.9 kg (174 lb)   25 78.9 kg (173 lb 14.4 oz)          Data:          Lab Results   Component Value Date     2025     2025     2025    Lab Results   Component Value Date    CHLORIDE 98 2025    CHLORIDE 96 2025    CHLORIDE 93 2025    Lab Results   Component Value Date    BUN 21.8 2025    BUN 27.9 2025    BUN 23.3 2025      Lab Results   Component Value Date    POTASSIUM 5.3 2025    POTASSIUM 4.8 2025    POTASSIUM 4.5 2025    Lab Results   Component Value Date    CO2 27 2025    CO2 29 2025    CO2 27 2025    Lab Results   Component Value Date    CR 3.07 2025    CR 3.88 2025    CR 3.33 2025        Recent Labs   Lab Test 25  0743 25  0823 25  0703   WBC 12.4* 8.9 11.6*   HGB 8.8* 6.3* 7.2*   HCT 28.2* 21.3* 23.0*   MCV 94 97 94   * 355 456*     Recent Labs   Lab Test 25  1517 25  1852 25  1606   AST 12 12 17   ALT 6 8 14   ALKPHOS 115 101 246*   BILITOTAL 0.2 0.3 0.2       Recent Labs   Lab Test 25  0743 25  0823 25  0703   MAG 2.0 2.0 2.0     Recent Labs   Lab Test 25  0743 25  0823 25  0703   PHOS 3.4 3.9 4.3     Recent Labs    Lab Test 02/28/25  0743 02/27/25  0823 02/26/25  0703   TERENCE 9.3 8.6* 8.7*       Lab Results   Component Value Date    TERENCE 9.3 02/28/2025     Lab Results   Component Value Date    WBC 12.4 (H) 02/28/2025    HGB 8.8 (L) 02/28/2025    HCT 28.2 (L) 02/28/2025    MCV 94 02/28/2025     (H) 02/28/2025     Lab Results   Component Value Date     (L) 02/28/2025    POTASSIUM 5.3 02/28/2025    CHLORIDE 98 02/28/2025    CO2 27 02/28/2025     (H) 02/28/2025     Lab Results   Component Value Date    BUN 21.8 02/28/2025    CR 3.07 (H) 02/28/2025     Lab Results   Component Value Date    MAG 2.0 02/28/2025     Lab Results   Component Value Date    PHOS 3.4 02/28/2025       Creatinine   Date Value Ref Range Status   02/28/2025 3.07 (H) 0.67 - 1.17 mg/dL Final   02/27/2025 3.88 (H) 0.67 - 1.17 mg/dL Final   02/26/2025 3.33 (H) 0.67 - 1.17 mg/dL Final   02/25/2025 5.13 (H) 0.67 - 1.17 mg/dL Final   02/24/2025 4.30 (H) 0.67 - 1.17 mg/dL Final   02/23/2025 3.20 (H) 0.67 - 1.17 mg/dL Final       Attestation:  I have reviewed today's vital signs, notes, medications, labs and imaging.  Seen on dialysis.     Jimbo Foreman MD

## 2025-02-28 NOTE — PLAN OF CARE
Date/Time: 2/27/25 1900-2330     Summary: 63 y/o M w/PMH of ESRD (on PD), DM, GERD, TALI, gout, CAD, a fib (on warfarin), HLD, etc.  Transfer from [place] on 1/21 w/RLE pain w/ R leg and heel ulcer ongoing since 10/24. Has had R toes amputated, R fem pop, and multiple other procedures/interventions.  Orientation: A&Ox4  Behavior & Aggression: green  Activity: Ax2, lift; T&Rq2, refuses repositioning  Diet: low K+ diet  Fall risk: yes  Isolation: contact for MRSA  Pain: denies  B&B: incontinent of bowel, no BM this shift.  VS/O2: VSS, tachycardic at times; on 1 L NC for comfort.  IV: R& L PIV, SL; R CVC.  Drains/Devices: N/A  Tele: SR w/1st degree AV block  Abnormal Labs: BG checks, did not need coverage. On Mg protocol, replaced this shift, recheck in AM. Hgb low on day shift, 1 unit given, recheck in AM.  Skin: RLE incision sites CDI, pulses were doppled as ordered. R foot amputation ace wrap and gauze in place, CDI, black heel/malleolus wounds. R groin site w/ hematoma. Peritoneal site CDI. Doppler pulses intact.  Consults/Procedures: N/A  D/C Date: aida reyna, 2/28, pending Hgb.  Other: N/A

## 2025-02-28 NOTE — PROGRESS NOTES
Potassium   Date Value Ref Range Status   02/28/2025 5.3 3.4 - 5.3 mmol/L Final     Hemoglobin   Date Value Ref Range Status   02/28/2025 8.8 (L) 13.3 - 17.7 g/dL Final     Creatinine   Date Value Ref Range Status   02/28/2025 3.07 (H) 0.67 - 1.17 mg/dL Final     Urea Nitrogen   Date Value Ref Range Status   02/28/2025 21.8 8.0 - 23.0 mg/dL Final     Sodium   Date Value Ref Range Status   02/28/2025 134 (L) 135 - 145 mmol/L Final     INR   Date Value Ref Range Status   02/28/2025 2.06 (H) 0.85 - 1.15 Final     INR (External)   Date Value Ref Range Status   01/14/2025 4.7 (A) 0.9 - 1.1 Final       DIALYSIS PROCEDURE NOTE  Hepatitis status of previous patient on machine log was checked and verified ok to use with this patients hepatitis status.  Patient dialyzed for 3 hrs. on a K2 bath with a net fluid removal of  1.5L.  A BFR of 400 ml/min was obtained via a right CVC    The treatment plan was discussed with Dr. Foreman during the treatment.    Total heparin received during the treatment: 0 units.     Line flushed, clamped and capped with heparin 1:1000 1.6 mL (1600 units) per lumen    Meds  given: Epoetin [id number]   Complications: None      Person educated: patient. Knowledge base . Barriers to learning: none. Educated on procedure via verbal mode. Patient verbalized understanding.   ICEBOAT? Timeout performed pre-treatment  I: Patient was identified using 2 identifiers  C:  Consent Signed Yes  E: Equipment preventative maintenance is current and dialysis delivery system OK to use  B: Hepatitis B Surface Antigen: Negative; Draw Date: 02/26/2025      Hepatitis B Surface Antibody: Susceptible; Draw Date: 02/20/2025  O: Dialysis orders present and complete prior to treatment  A: Vascular access verified and assessed prior to treatment  T: Treatment was performed at a clinically appropriate time  ?: Patient was allowed to ask questions and address concerns prior to treatment  See Adult Hemodialysis flowsheet in EPIC for  further details and post assessment.  Machine water alarm in place and functioning. Transducer pods intact and checked every 15min.   Pt assisted with repositioning throughout dialysis treatment.  Pt returned via bed.  Chlorine/Chloramine water system checked every 4 hours.        Post treatment report given to GERALDO Enriquez RN regarding 1.5L of fluid removed.      TRIXIE Red RN

## 2025-02-28 NOTE — PROGRESS NOTES
Vascular surgery progress Note    Arnulfo able to discharge, for wound care to the right groin, apply aquacel, cover with either gauze or foam border, change every other day + prn.     Follow-up arranged with vascular surgery.     Cecilia Stearns CNP      As above. We changed the right groin wound dressing today. Stable, minimal erythema, no drainage, new aquacel placed in wound bed and covered with gauze.    Jersey Maki MD

## 2025-02-28 NOTE — PROGRESS NOTES
Outpt orders called in to [facility]:    3h  RISWEETIE c   K2  Na 138 CO2 35  No heparin  Weekly hb + Mircera protocol  -2L on Monday and make end wt the EDW

## 2025-02-28 NOTE — PROGRESS NOTES
Care Management Follow Up    Length of Stay (days): 38    Expected Discharge Date: 03/01/2025     Concerns to be Addressed: discharge planning     Patient plan of care discussed at interdisciplinary rounds: Yes    Anticipated Discharge Disposition: Skilled Nursing Facility     Anticipated Discharge Services:    Anticipated Discharge DME:      Patient/family educated on Medicare website which has current facility and service quality ratings: yes  Education Provided on the Discharge Plan:  yes  Patient/Family in Agreement with the Plan: yes    Referrals Placed by CM/SW: Senior Linkage Line, Post Acute Facilities, Transportation  Private pay costs discussed: Not applicable    Discussed  Partnership in Safe Discharge Planning  document with patient/family: No     Handoff Completed: No, handoff not indicated or clinically appropriate    Additional Information:  Updated nephrology this AM, Dr. Gzuman, that patient did not discharge yesterday, Added on for dialysis on Saturday AM to prepare for Monday, Wednesday and Friday outpatient dialysis at [facility].   Writer updated dialysis unit, still needing orders. Updated Dr. Guzman to complete.    Next Steps: await discharge.     Calli Hyman RN   [facility]   Phone [phone], [employer] or [phone]

## 2025-02-28 NOTE — PROGRESS NOTES
[facility]    Medicine Progress Note - Hospitalist Service    Date of Admission:  1/21/2025    Assessment & Plan    Arnulfo Pritchett is a 64 year old male with past medical history of severe heart failure (EF 30%), ESRD (on peritoneal dialysis), chronic paroxysmal AF on warfarin, LLE PAD s/p endarterectomy, RCC s/p nephrectomy admitted on 1/21/2025 for septic shock secondary to right leg PAD with worsening right heel necrotic ulcer. The patient was seen in ED at outside hospital on 1/3/25 and diagnosed with pneumonia, later completing course of Cefdinir. He then presented on 1/21/25 to outside hospital for right leg pain and found to have right leg ulcer and transferred to [facility] for vascular surgery evaluation. Now s/p right common femoral BK popliteal/tibial endarterectomy and right common femoral to below-knee popliteal/tibial PTFE bypass performed on 1/27/25. The patient was admitted to the ICU for requirement of mechanical ventilation and pressor support following surgery for multifactorial shock.       Hospitalist service consulted 1/31/2025 for transfer out of ICU and to assist with medical management along with vascular surgery, podiatry and Infectious disease.     Hx PAD of LLE s/p endarterectomy and fem-tibioperoneal trunk bypass on 10/25/24  Hx RLE arterial occlusion s/p SFA angioplasty and stent 5/2024  PAD RLE, right heel gangrene, occluded SFA, no evidence of osteomyelitis  s/p right common femoral and popliteal/tibial endarterectomy, right femoral to popliteal/tibial PTFE bypass 1/27/25   # Right groin hematoma resolving   -Found to have critical limb ischemia on admission on January 21, for details see admission note.   -Distributive shock following surgery followed by the ICU service until 1/31.  -Followed by vascular surgery, podiatry, wound care, and ID.  -Coumadin has been held since admission, was was started on IV heparin drip since admission.  -Restarted warfarin on  2/14/2025 after discussion with vascular surgery.   heparin gtt discontinued.  -Has been maintained on statin and Plavix 75 mg daily during this hospital stay.  -Recent IV piperacillin/tazobactam (Zosyn), discontinued 2/2 per ID after 12 days of Zosyn; follow-up cultures, monitor off antibiotics.  -Wound care per surgery and WOC.  -pain control, see hospital orders.  -worsening ischemic changes in 2nd-5th toes of right foot.  -Underwent transmetatarsal amputation on 2/11/2025.  Tolerated procedure well.  Intraoperatively no signs of infection.  No further surgery per podiatry.  -Leukocytosis improved postsurgery.  Hemoglobin trended down to 7 on 2/13/2025 requiring transfusion, Hgb stable since then.  Patient states since most recent surgery he has not been ambulatory requiring assist for mobility.  2/27/2025, see vascular surgery note:  Drainage reported from right groin wound yesterday, though no drainage appreciated on exam today.  Debrided the eschar from the distal half of the incision and dressed with Aquacel which can be changed daily. No evidence of deep infection.  - Continue plavix and warfarin  - awaiting discharge to TCU  2/28/2025 increasing white count 12.4, new tachycardia, procalcitonin elevated 0.93.  Examination of the right groin wound on encounter large at 7+ centimeters,deep wound with packing, scabbing present with marked erythema at edges.  Discussed with Cecilia Vascular Surgery if needed antibiotic coverage or monitor without antibiotics and repeat WBC in a.m.  I see that she had her attending evaluate again and right groin wound was dressed with new Aquacel placed over wound bed and covered with gauze.  Plan repeat WBC, monitor vital signs and survey wound in AM.  Cecilia vascular surgery states if increased signs of wound infection, consult ID.  Discussed with nursing and SW.  Discharge pending serial decrease in WBC, wound stability without signs of infection.      Multifactorial shock,  resolved  ICU admission, weaned off vasopressors 1/30.  -Transitioned to midodrine with increase in prior to admission dose on1/31.  -Monitor vitals  2/25/2025.  See vascular surgery note 2/24/2025 okay to discharge per vascular surgery.     End-stage renal disease (ESRD), on peritoneal dialysis  Hx of secondary hyperparathyroidism and hyperphosphatemia, phosphorous elevated above baseline of 6 at most recent of 7.9  R plueral effusion, status post thoracentesis 2/24/2025  Nephrology following.  -Chronic peritoneal dialysis for the last 3.5 years. Patient expressed interest to switch to HD. Nephrology discussed with his primary nephrologist who agrees to transition to hemodialysis.  Underwent right tunneled IJ catheter placement by IR on 2/12/2025.  Now undergoing HD on MWF schedule.  -Continue midodrine.  -Peritoneal dialysis catheter removed 12/19 by vascular surgery  -2/25/2025.  1.7 L removed R thoracentesis 2/24/2025.  Potassium 6.3 , PD on low potassium bath today.  No fluid removed.  Recheck potassium tomorrow, PD on TTS schedule.  Fluid and electrolyte management per PD/nephrology.  Started on low potassium diet.  Telemetry for hyperkalemia  2/26/2025, potassium 4.5 on low potassium diet.   2/27/2025, dialysis yesterday, no reported complications.  Drop in hemoglobin 6.3, no active S/S bleeding.  Pain related to I&D performed yesterday.  Transfuse 1 Unit.  Repeat hemoglobin in a.m., monitor right groin wound.  Currently no signs of hematoma.  This delayed plan discharged with transfusion, recheck hemoglobin in a.m., monitor right groin.  Likely discharge as planned tomorrow to TCU with outpatient dialysis.  -2/28/2025  HD per nephrology.        acute hypoxemic respiratory failure, resolved  Right pleural effusion  Status post thoracentesis 1/24/25, repeat 2/2/25  -Following surgery on admission by vascular surgery was intubated.  Recent pneumonia treated with 3 weeks of Ceftin prior to this admission.  Extubated 1/29/25  -Right pleural effusion.  Status post thoracentesis 1/24/25 with 1.5 L clear yellow fluid removed, likely transudative based on low cell count of 117, , and protein of 1.7. Possibly secondary to reduced peritoneal dialysis during hospitalization vs underlying severe HFrEF (30%).  -CT chest 2/1/25, see report, no clear pneumonia; large right pleural effusion noted.  -Now off oxygen. Encourage incentive spirometry.  -Antibiotics as above, discontinued by ID 2/2  -ID consulted 2/1 as above, recommend monitoring patient off antibiotics  -S/p repeat Right thoracentesis on 2/2 with 2.3 L of rolan fluid removed; additional studies, cultures (negative to date), cytology (negative for malignancy)   -repeat Cxr showing pleural effusion with SOB  1.7 L removed thoracentesis 2/24/2025.  Monitor in TCU     Paroxysmal atrial fibrillation  Chronic anticoagulation prior to admission, warfarin   Ischemic cardiomyopathy with HLD, CAD s/p multiple stents, and severe HFrEF (30%)   Troponin elevation, likely type 2 MI  Wide complex tachycardia 2/1/25  Assessment-postoperatively has been maintained on heparin drip.  Coumadin held then restarted postop  Pharmacy dosing warfarin.  -Echo 1/31-EF 25 to 30%.  Severe global hypokinesis with abnormal septal motion consistent with conduction abnormality.  Severe inferior wall hypokinesis.  LVH.  RV mildly dilated and mildly reduced RV function.  Mild mitral regurgitation.  Mild to moderate mitral stenosis.  Moderate aortic stenosis.  Left ventricular fairly pressures elevated.  -Episode of wide-complex tachycardia noted by nursing staff, 15 beats, up on 2/1/2025.  -Continue inpatient telemetry.  -EP consulted on 2/16 given tachycardia with rates to 120s. They evaluated patient and said not unexpected given patient's severe HFrEF.              - Recommended increasing Toprol XL to 12.5 mg BID (PTA was on 25 mg daily)          # Confusion  # Metabolic encephalopathy  resolved  -Patient refused MRI scan        Anemia of chronic disease  Baseline around 10.  Has been stable in the 8 range. Did need 1 U PRBC on 2/8 and 2/13 for Hgb<7 without signs of active bleeding.  -Nephrology started patient on EPO on 2/9 and to continue weekly.  -See above.  2/27 2025 monitoring right groin for hematoma after I&D 2/28/2025.  Hemoglobin stable, no signs of right groin hematoma.        # Hyponatremia  Hyperkalemia  Electrolyte and fluid management per HD        Hyperglycemia  Type 2 diabetes   hemoglobin A1c 5.7% October 24  PTA regimen: Lantus 35 units at bedtime  Insulin requirements have been decreasing and he started having hypoglycemia episodes after being started on HD on 2/13/2025  Discontinue Lantus and carb coverage  Continue sliding scale insulin. Discharge insulin dosing dependent on requirements from sliding scale.  Continue to monitor FSBS        Mixed anion gap acidosis, resolved  -Improved respiratory function, monitor.  Anion gap resolved 1/31.     Abdominal discomfort 1/30, resolved  Noted to have some right upper quadrant pain 1/30.  Now resolved.  LFTs normal.  Was on Zosyn for above surgical issues.   -Monitor abdominal exam.  -Antibiotics managed as noted above.     severe protein calorie nutrition in the context of acute on chronic illness or injury   -Nutrition consulted.  Nutrition supplement.  Low potassium.  -Speech and language therapy (SLP) consulted, diet per speech therapy.     Gout  -PTA allopurinol resolved and changed to dialysis dosing 100 mg 3 times weekly     History of renal cell carcinoma   # Patient had CTA done in in October 2024 which showed a 4 mm hypoenhancing nodule within the lower part of the left kidney which radiology said that might represent a small primary renal cell carcinoma.  Patient has been aware of this findings and did not follow-up with outpatient  CT abdomen pelvis done in July 2025 showed cyst in the left kidney  -Did not want workup  with urology but per prior hospitalist 2/20/2025 he said that he is open to seeing urology at [facility]  -Case management consulted        -Status post right nephrectomy, monitor.     History of obstructive sleep apnea.  By report, intolerant of CPAP   -Monitor, follow-up with outpatient provider.     Weakness and deconditioning  -PT, OT, speech and language therapy (SLP) consulted.  -Increase activity as tolerated.     Disposition  -Anticipated discharge to transitional care unit with dialysis facility or transport.       # Goals of care were discussed with the patient and prior hospitalist on 2/20/25 and he does not want to be full code and does not want ribs to be broken or be on the ventilator and code status changed to DNR            Diet: Advance Diet as Tolerated: Regular Diet Adult; Regular Diet Adult; 2 gm K Diet    DVT Prophylaxis: IV heparin  Rosario Catheter: Not present  Lines: PRESENT      CVC Double Lumen Right Internal jugular Non - valved (open ended);Tunneled-Site Assessment: WDL      Cardiac Monitoring: ACTIVE order. Indication: Electrolyte Imbalance (24 hours)- Magnesium <1.3 mg/ml; Potassium < =2.8 or > 5.5 mg/ml  Code Status: No CPR- Do NOT Intubate      Clinically Significant Risk Factors         # Hyponatremia: Lowest Na = 132 mmol/L in last 2 days, will monitor as appropriate  # Hypochloremia: Lowest Cl = 96 mmol/L in last 2 days, will monitor as appropriate      # Hypoalbuminemia: Lowest albumin = 1.6 g/dL at 2/12/2025  7:26 AM, will monitor as appropriate  # Coagulation Defect: INR = 2.06 (Ref range: 0.85 - 1.15) and/or PTT = 42 Seconds (Ref range: 22 - 38 Seconds), will monitor for bleeding    # Hypertension: Noted on problem list    # Chronic heart failure with reduced ejection fraction: last echo with EF <40%          # DMII: A1C = 6.6 % (Ref range: <5.7 %) within past 6 months    # Severe Malnutrition: based on nutrition assessment      # Financial/Environmental Concerns:            Social Drivers of Health    Tobacco Use: Medium Risk (2/19/2025)    Patient History     Smoking Tobacco Use: Former     Smokeless Tobacco Use: Never   Alcohol Use: Unknown (11/28/2018)    Received from [facility], [facility] and [facility]    AUDIT-C     Frequency of Alcohol Consumption: Monthly or less     Frequency of Binge Drinking: Never   Transportation Needs: High Risk (1/21/2025)    Transportation Needs     Within the past 12 months, has lack of transportation kept you from medical appointments, getting your medicines, non-medical meetings or appointments, work, or from getting things that you need?: Yes   Physical Activity: Unknown (10/8/2024)    Exercise Vital Sign     Days of Exercise per Week: 0 days   Social Connections: Unknown (10/8/2024)    Social Connection and Isolation Panel [NHANES]     Frequency of Social Gatherings with Friends and Family: Patient declined          Disposition Plan     Medically Ready for Discharge: Anticipated Tomorrow- 2+days pending serial WBC, temp profile,, no signs of wound infection     Lynette Fox MD  Hospitalist Service  [facility]  Securely message with [employer] (more info)  Text page via [employer] Paging/Directory       ______________________________________________________________________    Interval History   As above serial leukocytosis, tachycardia, increased procalcitonin to 0.9.  Examination of wound, as below.  Vascular surgery reevaluated and placed Aquacel on wound.  Repeat WBC for trend in AM.     Physical Exam   Vital Signs: Temp: 97.7  F (36.5  C) Temp src: Oral BP: 107/74 Pulse: 86   Resp: 20 SpO2: 100 % O2 Device: Nasal cannula Oxygen Delivery: 1 LPM  Weight: 166 lbs .1 oz    General/Constitutional:   NAD, calm, cooperative  Chest/Respiratory: Respirations nonlabored room air  Cardiovascular:  LE edema none  Gastrointestinal/Abdomen:  soft, nontender,   Right groin 7  cm surgical wound, deep, with packing, scabbing throughout edges, erythema at edges  Neuro.  Gross motor tested, nonfocal,  Psych affect calm..

## 2025-02-28 NOTE — PROGRESS NOTES
Care Management Follow Up    Length of Stay (days): 38    Expected Discharge Date: 02/28/2025     Concerns to be Addressed: discharge planning     Patient plan of care discussed at interdisciplinary rounds: Yes    Anticipated Discharge Disposition: Skilled Nursing Facility              Anticipated Discharge Services:    Anticipated Discharge DME:      Patient/family educated on Medicare website which has current facility and service quality ratings: yes  Education Provided on the Discharge Plan:    Patient/Family in Agreement with the Plan: yes    Referrals Placed by CM/SW: Senior Linkage Line, Post Acute Facilities, Transportation  Private pay costs discussed: Not applicable    Discussed  Partnership in Safe Discharge Planning  document with patient/family: No     Handoff Completed: Yes, [facility] PCP: Internal handoff referral completed    Additional Information:  GEENA messaged hospitalist to see if patient was medically ready to discharge as planned today. GEENA notified he was not. GEENA called [facility] to check if patient could arrive Saturday. [facility] takes weekend admits and can accept patient tomorrow. GEENA called [facility] transport and rescheduled ride for the same time ([phone]).     Patient has dialysis in the morning, should be done well before transport time. Admissions at TCU is only in until 1pm on Saturday and would like orders prior to that, ideally around noon.     Next Steps: Orders     MICHAEL Amin

## 2025-03-01 VITALS
DIASTOLIC BLOOD PRESSURE: 101 MMHG | OXYGEN SATURATION: 93 % | BODY MASS INDEX: 21.58 KG/M2 | WEIGHT: 163.58 LBS | SYSTOLIC BLOOD PRESSURE: 149 MMHG | TEMPERATURE: 97.9 F | RESPIRATION RATE: 18 BRPM | HEART RATE: 56 BPM

## 2025-03-01 LAB
ANION GAP SERPL CALCULATED.3IONS-SCNC: 9 MMOL/L (ref 7–15)
BUN SERPL-MCNC: 16.1 MG/DL (ref 8–23)
CALCIUM SERPL-MCNC: 9.2 MG/DL (ref 8.8–10.4)
CHLORIDE SERPL-SCNC: 97 MMOL/L (ref 98–107)
CREAT SERPL-MCNC: 2.34 MG/DL (ref 0.67–1.17)
EGFRCR SERPLBLD CKD-EPI 2021: 30 ML/MIN/1.73M2
ERYTHROCYTE [DISTWIDTH] IN BLOOD BY AUTOMATED COUNT: 22.3 % (ref 10–15)
GLUCOSE BLDC GLUCOMTR-MCNC: 161 MG/DL (ref 70–99)
GLUCOSE BLDC GLUCOMTR-MCNC: 169 MG/DL (ref 70–99)
GLUCOSE SERPL-MCNC: 145 MG/DL (ref 70–99)
HCO3 SERPL-SCNC: 28 MMOL/L (ref 22–29)
HCT VFR BLD AUTO: 27 % (ref 40–53)
HGB BLD-MCNC: 8.3 G/DL (ref 13.3–17.7)
INR PPP: 1.87 (ref 0.85–1.15)
MAGNESIUM SERPL-MCNC: 2 MG/DL (ref 1.7–2.3)
MCH RBC QN AUTO: 29.4 PG (ref 26.5–33)
MCHC RBC AUTO-ENTMCNC: 30.7 G/DL (ref 31.5–36.5)
MCV RBC AUTO: 96 FL (ref 78–100)
PHOSPHATE SERPL-MCNC: 3 MG/DL (ref 2.5–4.5)
PLATELET # BLD AUTO: 420 10E3/UL (ref 150–450)
POTASSIUM SERPL-SCNC: 4.8 MMOL/L (ref 3.4–5.3)
RBC # BLD AUTO: 2.82 10E6/UL (ref 4.4–5.9)
SODIUM SERPL-SCNC: 134 MMOL/L (ref 135–145)
WBC # BLD AUTO: 12.3 10E3/UL (ref 4–11)

## 2025-03-01 PROCEDURE — 250N000013 HC RX MED GY IP 250 OP 250 PS 637

## 2025-03-01 PROCEDURE — 80048 BASIC METABOLIC PNL TOTAL CA: CPT

## 2025-03-01 PROCEDURE — 99238 HOSP IP/OBS DSCHRG MGMT 30/<: CPT | Performed by: HOSPITALIST

## 2025-03-01 PROCEDURE — 84100 ASSAY OF PHOSPHORUS: CPT

## 2025-03-01 PROCEDURE — 250N000009 HC RX 250: Performed by: STUDENT IN AN ORGANIZED HEALTH CARE EDUCATION/TRAINING PROGRAM

## 2025-03-01 PROCEDURE — 84132 ASSAY OF SERUM POTASSIUM: CPT

## 2025-03-01 PROCEDURE — 36415 COLL VENOUS BLD VENIPUNCTURE: CPT

## 2025-03-01 PROCEDURE — 250N000013 HC RX MED GY IP 250 OP 250 PS 637: Performed by: HOSPITALIST

## 2025-03-01 PROCEDURE — 83735 ASSAY OF MAGNESIUM: CPT

## 2025-03-01 PROCEDURE — 85610 PROTHROMBIN TIME: CPT

## 2025-03-01 PROCEDURE — 85018 HEMOGLOBIN: CPT | Performed by: STUDENT IN AN ORGANIZED HEALTH CARE EDUCATION/TRAINING PROGRAM

## 2025-03-01 RX ORDER — WARFARIN SODIUM 5 MG/1
5 TABLET ORAL
Status: DISCONTINUED | OUTPATIENT
Start: 2025-03-01 | End: 2025-03-01 | Stop reason: HOSPADM

## 2025-03-01 RX ORDER — WARFARIN SODIUM 5 MG/1
5 TABLET ORAL
Qty: 21 TABLET | Refills: 0 | Status: SHIPPED | OUTPATIENT
Start: 2025-03-03

## 2025-03-01 RX ORDER — MAGNESIUM OXIDE 400 MG/1
400 TABLET ORAL EVERY 4 HOURS
Status: COMPLETED | OUTPATIENT
Start: 2025-03-01 | End: 2025-03-01

## 2025-03-01 RX ORDER — ALBUTEROL SULFATE 90 UG/1
2 INHALANT RESPIRATORY (INHALATION) EVERY 6 HOURS PRN
Qty: 18 G | Refills: 1 | Status: SHIPPED | OUTPATIENT
Start: 2025-03-01

## 2025-03-01 RX ADMIN — SENNOSIDES AND DOCUSATE SODIUM 1 TABLET: 50; 8.6 TABLET ORAL at 09:20

## 2025-03-01 RX ADMIN — SEVELAMER CARBONATE 800 MG: 800 TABLET, FILM COATED ORAL at 12:17

## 2025-03-01 RX ADMIN — ALBUTEROL SULFATE 2.5 MG: 2.5 SOLUTION RESPIRATORY (INHALATION) at 15:39

## 2025-03-01 RX ADMIN — Medication 400 MG: at 12:16

## 2025-03-01 RX ADMIN — PANTOPRAZOLE SODIUM 40 MG: 40 TABLET, DELAYED RELEASE ORAL at 09:20

## 2025-03-01 RX ADMIN — Medication 400 MG: at 14:39

## 2025-03-01 RX ADMIN — METOPROLOL SUCCINATE 12.5 MG: 25 TABLET, EXTENDED RELEASE ORAL at 09:20

## 2025-03-01 RX ADMIN — MIDODRINE HYDROCHLORIDE 10 MG: 2.5 TABLET ORAL at 09:22

## 2025-03-01 RX ADMIN — ACETAMINOPHEN 975 MG: 325 TABLET, FILM COATED ORAL at 05:43

## 2025-03-01 RX ADMIN — SEVELAMER CARBONATE 800 MG: 800 TABLET, FILM COATED ORAL at 09:20

## 2025-03-01 RX ADMIN — Medication 1 CAPSULE: at 12:16

## 2025-03-01 RX ADMIN — ALBUTEROL SULFATE 2.5 MG: 2.5 SOLUTION RESPIRATORY (INHALATION) at 09:26

## 2025-03-01 RX ADMIN — ACETAMINOPHEN 975 MG: 325 TABLET, FILM COATED ORAL at 14:39

## 2025-03-01 ASSESSMENT — ACTIVITIES OF DAILY LIVING (ADL)
ADLS_ACUITY_SCORE: 76

## 2025-03-01 NOTE — DISCHARGE SUMMARY
[facility]  Hospitalist Discharge Summary      Date of Admission:  1/21/2025  Date of Discharge:  3/1/2025  Discharging Provider: Jeremiah Mann MD  Discharge Service: Hospitalist Service    Discharge Diagnoses   Patient Active Problem List   Diagnosis    Dependence on renal dialysis    Type 2 diabetes mellitus with left diabetic foot ulcer (H)    Anaphylactic shock, unspecified, initial encounter    Anemia due to chronic kidney disease, on chronic dialysis (H)    Anemia in chronic kidney disease    Atrial fibrillation (H)    Cardiomyopathy (H)    Chronic gout due to renal impairment of multiple sites without tophus    Chronic kidney disease, stage IV (severe) (H)    Coagulation defect    Coronary artery disease involving native coronary artery of native heart with angina pectoris    Coronary atherosclerosis    Dyslipidemia    ED (erectile dysfunction)    ESRD on peritoneal dialysis (H)    Essential (primary) hypertension    Gout    H/O: stroke    Hemoptysis    Hypercoagulable state due to chronic atrial fibrillation (H)    Hyperglycemia, drug-induced    Hyperkalemia    Mixed hypercholesterolemia and hypertriglyceridemia    Hypertensive urgency    Iron deficiency    Iron deficiency anemia, unspecified    Ischemic cardiomyopathy    Ischemic leg    Ischemic stroke (H)    CVA (cerebral vascular accident) (H)    Kidney transplant candidate    Left homonymous hemianopsia    Nephrotic range proteinuria    TALI on CPAP    Sleep apnea    Other disorders of phosphorus metabolism    Other specified abnormal findings of blood chemistry    Pain, unspecified    Peritoneal dialysis catheter in situ    Personal history of other malignant neoplasm of kidney    Pleural effusion    Renal mass    Right shoulder tendinitis    Secondary hyperparathyroidism of renal origin    ST elevation myocardial infarction involving right coronary artery (H)    Chronic systolic congestive heart failure (H)     Staphylococcus aureus pneumonia (H)    Systolic heart failure (H)    Venous stasis    Diabetes mellitus due to underlying condition, controlled, with chronic kidney disease on chronic dialysis, with long-term current use of insulin (H)    Diabetes mellitus type 2, insulin dependent (H)    Type 2 diabetes mellitus with chronic kidney disease (H)    Type 2 diabetes mellitus (H)    Type 2 DM with hypertension and ESRD on dialysis (H)    Long term current use of anticoagulant therapy    Foot infection    Local skin infection    NSTEMI (non-ST elevated myocardial infarction) (H)    Orthostatic hypotension    Hypovolemia    Moderate protein-calorie malnutrition    Cellulitis    Pressure ulcer of foot    Acute respiratory failure with hypoxia (H)    Shock (H)    Toxic metabolic encephalopathy         Clinically Significant Risk Factors     # DMII: A1C = 6.6 % (Ref range: <5.7 %) within past 6 months  # Severe Malnutrition: based on nutrition assessment      Follow-ups Needed After Discharge   Follow-up Appointments       Follow Up and recommended labs and tests      Follow up with [facility] located at [id number] [place] [address] [address], [place] [address]. Phone number is [phone]. Call to schedule appt in 1-2 weeks.        Follow Up and recommended labs and tests      Follow up with Nursing home physician.  No follow up labs or test are needed.            {Additional follow-up instructions/to-do's for PCP/ TCU providers for routine cares    Unresulted Labs Ordered in the Past 30 Days of this Admission       No orders found from 12/22/2024 to 1/22/2025.        These results will be followed up by PCP/ Nursing home providers    Discharge Disposition   Discharged to long-term care facility  Condition at discharge: Stable    Hospital Course   Arnulfo Pritchett is a 64 year old male with past medical history of severe heart failure (EF 30%), ESRD (on peritoneal dialysis), chronic paroxysmal AF on  warfarin, LLE PAD s/p endarterectomy, RCC s/p nephrectomy admitted on 1/21/2025 for septic shock secondary to right leg PAD with worsening right heel necrotic ulcer. The patient was seen in ED at outside hospital on 1/3/25 and diagnosed with pneumonia, later completing course of Cefdinir. He then presented on 1/21/25 to outside hospital for right leg pain and found to have right leg ulcer and transferred to [facility] for vascular surgery evaluation. Now s/p right common femoral BK popliteal/tibial endarterectomy and right common femoral to below-knee popliteal/tibial PTFE bypass performed on 1/27/25. The patient was admitted to the ICU for requirement of mechanical ventilation and pressor support following surgery for multifactorial shock.       Hospitalist service consulted 1/31/2025 for transfer out of ICU and to assist with medical management along with vascular surgery, podiatry and Infectious disease.     Hx PAD of LLE s/p endarterectomy and fem-tibioperoneal trunk bypass on 10/25/24  Hx RLE arterial occlusion s/p SFA angioplasty and stent 5/2024  PAD RLE, right heel gangrene, occluded SFA, no evidence of osteomyelitis  s/p right common femoral and popliteal/tibial endarterectomy, right femoral to popliteal/tibial PTFE bypass 1/27/25   # Right groin hematoma resolving   -Found to have critical limb ischemia on admission on January 21, for details see admission note.   -Distributive shock following surgery followed by the ICU service until 1/31.  -Followed by vascular surgery, podiatry, wound care, and ID.  -Coumadin has been held since admission, was was started on IV heparin drip since admission.  -Restarted warfarin on 2/14/2025 after discussion with vascular surgery.   heparin gtt discontinued.  -Has been maintained on statin and Plavix 75 mg daily during this hospital stay.  -Recent IV piperacillin/tazobactam (Zosyn), discontinued 2/2 per ID after 12 days of Zosyn; follow-up cultures, monitor off  antibiotics.  -Wound care per surgery and WOC.  -pain control, see hospital orders.  -worsening ischemic changes in 2nd-5th toes of right foot.  -Underwent transmetatarsal amputation on 2/11/2025.  Tolerated procedure well.  Intraoperatively no signs of infection.  No further surgery per podiatry.  -Leukocytosis improved postsurgery.  Hemoglobin trended down to 7 on 2/13/2025 requiring transfusion, Hgb stable since then.  Patient states since most recent surgery he has not been ambulatory requiring assist for mobility.  2/27/2025, see vascular surgery note:  Drainage reported from right groin wound yesterday, though no drainage appreciated on exam today.  Debrided the eschar from the distal half of the incision and dressed with Aquacel which can be changed daily. No evidence of deep infection.  - Continue plavix and warfarin  - awaiting discharge to TCU  2/28/2025 increasing white count 12.4, new tachycardia, procalcitonin elevated 0.93.  Examination of the right groin wound on encounter large at 7+ centimeters,deep wound with packing, scabbing present with marked erythema at edges.  Discussed with [facility] if needed antibiotic coverage or monitor without antibiotics and repeat WBC in a.m.  I see that she had her attending evaluate again and right groin wound was dressed with new Aquacel placed over wound bed and covered with gauze.  Plan repeat WBC, monitor vital signs and survey wound in AM.  [facility] states if increased signs of wound infection, consult ID.  Discussed with nursing and SW.  Discharge pending serial decrease in WBC, wound stability without signs of infection.    At the time of discharge,on 3/1/2025.  Seen and evaluated the patient, will leukocytosis slightly decreased from yesterday.  Patient remained afebrile, no other hemodynamic instabilities.  No clear evidence of worsening wound infection.  Patient is deemed appropriate for discharge to the next level of  care-TCU.    Multifactorial shock, resolved  ICU admission, weaned off vasopressors 1/30.  -Transitioned to midodrine with increase in prior to admission dose on1/31.  -Monitor vitals  2/25/2025.  See vascular surgery note 2/24/2025 okay to discharge per vascular surgery.     End-stage renal disease (ESRD), on peritoneal dialysis  Hx of secondary hyperparathyroidism and hyperphosphatemia, phosphorous elevated above baseline of 6 at most recent of 7.9  R plueral effusion, status post thoracentesis 2/24/2025  Nephrology following.  -Chronic peritoneal dialysis for the last 3.5 years. Patient expressed interest to switch to HD. Nephrology discussed with his primary nephrologist who agrees to transition to hemodialysis.  Underwent right tunneled IJ catheter placement by IR on 2/12/2025.  Now undergoing HD on MWF schedule.  -Continue midodrine.  -Peritoneal dialysis catheter removed 12/19 by vascular surgery  -2/25/2025.  1.7 L removed R thoracentesis 2/24/2025.  Potassium 6.3 , PD on low potassium bath today.  No fluid removed.  Recheck potassium tomorrow, PD on TTS schedule.  Fluid and electrolyte management per PD/nephrology.  Started on low potassium diet.  Telemetry for hyperkalemia  2/26/2025, potassium 4.5 on low potassium diet.   2/27/2025, dialysis yesterday, no reported complications.  Drop in hemoglobin 6.3, no active S/S bleeding.  Pain related to I&D performed yesterday.  Transfuse 1 Unit.  Repeat hemoglobin in a.m., monitor right groin wound.  Currently no signs of hematoma.  This delayed plan discharged with transfusion, recheck hemoglobin in a.m., monitor right groin.  Likely discharge as planned tomorrow to TCU with outpatient dialysis.  -2/28/2025  HD per nephrology.        acute hypoxemic respiratory failure, resolved  Right pleural effusion  Status post thoracentesis 1/24/25, repeat 2/2/25  -Following surgery on admission by vascular surgery was intubated.  Recent pneumonia treated with 3 weeks of  Ceftin prior to this admission. Extubated 1/29/25  -Right pleural effusion.  Status post thoracentesis 1/24/25 with 1.5 L clear yellow fluid removed, likely transudative based on low cell count of 117, , and protein of 1.7. Possibly secondary to reduced peritoneal dialysis during hospitalization vs underlying severe HFrEF (30%).  -CT chest 2/1/25, see report, no clear pneumonia; large right pleural effusion noted.  -Now off oxygen. Encourage incentive spirometry.  -Antibiotics as above, discontinued by ID 2/2  -ID consulted 2/1 as above, recommend monitoring patient off antibiotics  -S/p repeat Right thoracentesis on 2/2 with 2.3 L of rolan fluid removed; additional studies, cultures (negative to date), cytology (negative for malignancy)   -repeat Cxr showing pleural effusion with SOB  1.7 L removed thoracentesis 2/24/2025.  Monitor in TCU     Paroxysmal atrial fibrillation  Chronic anticoagulation prior to admission, warfarin   Ischemic cardiomyopathy with HLD, CAD s/p multiple stents, and severe HFrEF (30%)   Troponin elevation, likely type 2 MI  Wide complex tachycardia 2/1/25  Assessment-postoperatively has been maintained on heparin drip.  Coumadin held then restarted postop  Pharmacy dosing warfarin.  -Echo 1/31-EF 25 to 30%.  Severe global hypokinesis with abnormal septal motion consistent with conduction abnormality.  Severe inferior wall hypokinesis.  LVH.  RV mildly dilated and mildly reduced RV function.  Mild mitral regurgitation.  Mild to moderate mitral stenosis.  Moderate aortic stenosis.  Left ventricular fairly pressures elevated.  -Episode of wide-complex tachycardia noted by nursing staff, 15 beats, up on 2/1/2025.  -Continue inpatient telemetry.  -EP consulted on 2/16 given tachycardia with rates to 120s. They evaluated patient and said not unexpected given patient's severe HFrEF.              - Recommended increasing Toprol XL to 12.5 mg BID (PTA was on 25 mg daily)          #  Confusion  # Metabolic encephalopathy resolved  -Patient refused MRI scan        Anemia of chronic disease  Baseline around 10.  Has been stable in the 8 range. Did need 1 U PRBC on 2/8 and 2/13 for Hgb<7 without signs of active bleeding.  -Nephrology started patient on EPO on 2/9 and to continue weekly.  -See above.  2/27 2025 monitoring right groin for hematoma after I&D 2/28/2025.  Hemoglobin stable, no signs of right groin hematoma.        # Hyponatremia  Hyperkalemia  Electrolyte and fluid management per HD        Hyperglycemia  Type 2 diabetes   hemoglobin A1c 5.7% October 24  PTA regimen: Lantus 35 units at bedtime  Insulin requirements have been decreasing and he started having hypoglycemia episodes after being started on HD on 2/13/2025  Discontinue Lantus and carb coverage  Continue sliding scale insulin. Discharge insulin dosing dependent on requirements from sliding scale.  Continue to monitor FSBS        Mixed anion gap acidosis, resolved  -Improved respiratory function, monitor.  Anion gap resolved 1/31.     Abdominal discomfort 1/30, resolved  Noted to have some right upper quadrant pain 1/30.  Now resolved.  LFTs normal.  Was on Zosyn for above surgical issues.   -Monitor abdominal exam.  -Antibiotics managed as noted above.     severe protein calorie nutrition in the context of acute on chronic illness or injury   -Nutrition consulted.  Nutrition supplement.  Low potassium.  -Speech and language therapy (SLP) consulted, diet per speech therapy.     Gout  -PTA allopurinol resolved and changed to dialysis dosing 100 mg 3 times weekly     History of renal cell carcinoma   # Patient had CTA done in in October 2024 which showed a 4 mm hypoenhancing nodule within the lower part of the left kidney which radiology said that might represent a small primary renal cell carcinoma.  Patient has been aware of this findings and did not follow-up with outpatient  CT abdomen pelvis done in July 2025 showed cyst in  the left kidney  -Did not want workup with urology but per prior hospitalist 2/20/2025 he said that he is open to seeing urology at [facility]  -Case management consulted        -Status post right nephrectomy, monitor.     History of obstructive sleep apnea.  By report, intolerant of CPAP   -Monitor, follow-up with outpatient provider.     Weakness and deconditioning  -continue PT, OT, at the TCU  -Increase activity as tolerated.        # Goals of care were discussed with the patient and prior hospitalist on 2/20/25 and he does not want to be full code and does not want ribs to be broken or be on the ventilator and code status changed to DNR      Consultations This Hospital Stay   VASCULAR SURGERY IP CONSULT  NEPHROLOGY IP CONSULT  CARE MANAGEMENT / SOCIAL WORK IP CONSULT  PHARMACY TO DOSE WARFARIN  CARE MANAGEMENT / SOCIAL WORK IP CONSULT  PODIATRY IP CONSULT  PHARMACY TO DOSE PHYTONADIONE  PHARMACY IP CONSULT  PALLIATIVE CARE ADULT IP CONSULT  INTENSIVIST IP CONSULT  OCCUPATIONAL THERAPY ADULT IP CONSULT  PHYSICAL THERAPY ADULT IP CONSULT  WOUND OSTOMY CONTINENCE NURSE  IP CONSULT  NUTRITION SERVICES ADULT IP CONSULT  PHARMACY IP CONSULT  PHARMACY IP CONSULT  PHARMACY IP CONSULT  PHYSICAL THERAPY ADULT IP CONSULT  VASCULAR ACCESS ADULT IP CONSULT  SPEECH LANGUAGE PATH ADULT IP CONSULT  INFECTIOUS DISEASES IP CONSULT  PHARMACY TO DOSE VANCO  INTERVENTIONAL RADIOLOGY ADULT/PEDS IP CONSULT  INFECTIOUS DISEASES IP CONSULT  PALLIATIVE CARE ADULT IP CONSULT  VASCULAR ACCESS ADULT IP CONSULT  SPIRITUAL HEALTH SERVICES IP CONSULT  INTERVENTIONAL RADIOLOGY ADULT/PEDS IP CONSULT  PHARMACY TO DOSE WARFARIN  VASCULAR ACCESS ADULT IP CONSULT  CARDIOLOGY IP CONSULT  PHARMACY IP CONSULT  CARE MANAGEMENT / SOCIAL WORK IP CONSULT  PHARMACY IP CONSULT  PHYSICAL THERAPY ADULT IP CONSULT  OCCUPATIONAL THERAPY ADULT IP CONSULT  PHARMACY TO DOSE PHYTONADIONE    Code Status   No CPR- Do NOT Intubate    Time Spent on this Encounter   I,  Jeremiah Mann MD, personally saw the patient today and spent less than or equal to 30 minutes discharging this patient.       Jeremiah Mann MD  [facility] SURGERY  [address] [facility] [place] [address]  Phone: [phone]  Fax: [phone]  ______________________________________________________________________    Physical Exam   Vital Signs: Temp: 97.4  F (36.3  C) Temp src: Oral BP: (!) 137/102 Pulse: 112   Resp: 16 SpO2: 95 % O2 Device: Nasal cannula Oxygen Delivery: 1 LPM  Weight: 163 lbs 9.3 oz  General Appearance: Alert in NAD  Respiratory:  CTA no wheezing or crackles  Cardiovascular:  RRR no murmurs noted  GI: Abdomen is soft NT/ND + BS  Skin: Right groin surgical wound, noted   Other:         Primary Care Physician   Chai Griffin    Discharge Orders      Primary Care - Care Coordination Referral      Follow-Up with Cardiology WILLIAM      Med Therapy Management Referral      Follow Up and recommended labs and tests    Follow up with Alyce Salas DPM at [facility], located at [address] [place] [address] [address], [place] [address]. Phone number is [phone]. Call to schedule appt in 1-2 weeks.     Weight bearing status    NWB to RLE in surgical shoe. Heel use for pivots/transfers. Elevate while at rest.   Leave dressing to foot  at all times. Do not get wet in the shower.     Change dressing every other day to right foot: cleanse incision site and posterior heel with wound cleanser. Pad dry. Paint betadine to sites. Cover with gauze, gauze wrap and an ACE bandage.   Contact the office if you have any concerns.     Discharge Instructions    Follow up with a renal Ultrasound as previously ordered by Dr. Dumont from [facility].   An appointment has been scheduled for Thursday, March 20th at 1015 at [facility] at [address] [place], [place]   The Phone number of the clinic is [phone].     Wound care (specify)    Site:   Right  groin wound  Instructions: cleanse and pack with aquacel then cover with gauze and tape or dressings. Change daily and as needed.     Wound care (specify)    Site:   right groin  Instructions:  Aquacel + foam border, change every other day + prn     General info for SNF    Length of Stay Estimate: Short Term Care: Estimated # of Days 31-90  Condition at Discharge: Improving  Level of care:skilled   Rehabilitation Potential: Good  Admission H&P remains valid and up-to-date: Yes  Recent Chemotherapy: N/A  Use Nursing Home Standing Orders: Yes     Mantoux instructions    Give two-step Mantoux (PPD) Per Facility Policy Yes     Follow Up and recommended labs and tests    Follow up with Nursing home physician.  No follow up labs or test are needed.     Tubes and Drains    Current Tubes and Drains:     CVC Line  Duration           CVC Double Lumen Right Internal jugular Non - valved (open   ended);Tunneled 16 days              Reason for your hospital stay    You were in the hospital for wound infection     Activity - Up with nursing assistance     Physical Therapy Adult Consult    Evaluate and treat as clinically indicated.    Reason:  deconditioning and chronic wounds     Occupational Therapy Adult Consult    Evaluate and treat as clinically indicated.    Reason:  Deconditioning and chronic wounds     Fall precautions     Diet    Follow this diet upon discharge: Current Diet:Orders Placed This Encounter      Advance Diet as Tolerated: Regular Diet Adult; Regular Diet Adult; 2 gm K Diet       Significant Results and Procedures   Most Recent 3 CBC's:  Recent Labs   Lab Test 03/01/25  0745 02/28/25  0743 02/27/25  0823   WBC 12.3* 12.4* 8.9   HGB 8.3* 8.8* 6.3*   MCV 96 94 97    456* 355       Discharge Medications   Current Discharge Medication List        START taking these medications    Details   albuterol (PROAIR HFA/PROVENTIL HFA/VENTOLIN HFA) 108 (90 Base) MCG/ACT inhaler Inhale 2 puffs into the lungs every 6  hours as needed for wheezing.  Qty: 18 g, Refills: 1    Comments: Pharmacy may dispense brand covered by insurance (Proair, or proventil or ventolin or generic albuterol inhaler)  Associated Diagnoses: Toxic metabolic encephalopathy; Shock (H); Acute respiratory failure with hypoxia (H); Pressure injury of skin of foot, unspecified injury stage, unspecified laterality; Cellulitis, unspecified cellulitis site; Hypovolemia; Moderate protein-calorie malnutrition           CONTINUE these medications which have CHANGED    Details   !! warfarin ANTICOAGULANT (COUMADIN) 5 MG tablet Take 1 tablet (5 mg) by mouth five times a week. Tues, Wed, Thurs, Saturdays and Sundays.  Qty: 21 tablet, Refills: 0    Associated Diagnoses: Longstanding persistent atrial fibrillation (H); Long term current use of anticoagulant therapy       !! - Potential duplicate medications found. Please discuss with provider.        CONTINUE these medications which have NOT CHANGED    Details   acetaminophen (TYLENOL) 500 MG tablet Take 1,000 mg by mouth every 8 hours as needed.      allopurinol (ZYLOPRIM) 100 MG tablet Take 200 mg by mouth every evening      atorvastatin (LIPITOR) 40 MG tablet Take 40 mg by mouth at bedtime.      B Complex-C-Folic Acid (DIALYVITE) TABS Take 1 tablet by mouth daily.      calcitRIOL (ROCALTROL) 0.25 MCG capsule Take 0.25 mcg by mouth at bedtime.      cinacalcet (SENSIPAR) 60 MG tablet Take 60 mg by mouth. Take 1 tablet (60 mg) three times a week: Monday, Wednesday, and Friday nights      clopidogrel (PLAVIX) 75 MG tablet Take 1 tablet (75 mg) by mouth every evening.  Qty: 90 tablet, Refills: 3    Associated Diagnoses: PAD (peripheral artery disease)      gentamicin (GARAMYCIN) 0.1 % external cream Apply topically daily as needed. Apply topically on peritoneal access site to prevent infections      glucose 40 % (400 mg/mL) gel Take 15-30 g by mouth every 15 minutes as needed for low blood sugar.    Associated Diagnoses:  Type 2 diabetes mellitus with chronic kidney disease, with long-term current use of insulin, unspecified CKD stage (H)      insulin glargine (LANTUS PEN) 100 UNIT/ML pen Inject 35 Units subcutaneously at bedtime.    Comments: If Lantus is not covered by insurance, may substitute Basaglar or Semglee or other insulin glargine product per insurance preference at same dose and frequency.    Associated Diagnoses: ACS (acute coronary syndrome) (H)      melatonin 5 MG tablet Take 5 mg by mouth at bedtime      metoprolol succinate ER (TOPROL XL) 25 MG 24 hr tablet Take 1 tablet (25 mg) by mouth daily.  Qty: 90 tablet, Refills: 3    Associated Diagnoses: Longstanding persistent atrial fibrillation (H)      midodrine (PROAMATINE) 2.5 MG tablet Take 1 tablet (2.5 mg) by mouth once as needed (hypotension/dizziness post PD in AM during the day).    Associated Diagnoses: Diabetes mellitus due to underlying condition, controlled, with chronic kidney disease on chronic dialysis, with long-term current use of insulin (H)      omeprazole (PRILOSEC) 20 MG DR capsule Take 20 mg by mouth daily.      SEVELAMER CARBONATE PO Take 800 mg by mouth 3 times daily (with meals)      !! warfarin ANTICOAGULANT (COUMADIN) 5 MG tablet Take 7.5 mg by mouth twice a week. Mondays and Fridays..      Continuous Glucose Sensor (DEXCOM G7 SENSOR) MISC Change every 10 days.  Qty: 9 each, Refills: 1    Associated Diagnoses: Diabetes mellitus due to underlying condition, controlled, with chronic kidney disease on chronic dialysis, with long-term current use of insulin (H)       !! - Potential duplicate medications found. Please discuss with provider.        Allergies   No Known Allergies

## 2025-03-01 NOTE — PROGRESS NOTES
Care Management Discharge Note    Discharge Date: 03/01/2025       Discharge Disposition: Skilled Nursing Facility    Discharge Services:      Discharge DME:      Discharge Transportation: family or friend will provide    Private pay costs discussed: Not applicable    Does the patient's insurance plan have a 3 day qualifying hospital stay waiver?  No    PAS Confirmation Code: [id number]  Patient/family educated on Medicare website which has current facility and service quality ratings: yes    Education Provided on the Discharge Plan:    Persons Notified of Discharge Plans: Patient, TCU, and [facility] staff  Patient/Family in Agreement with the Plan: yes    Handoff Referral Completed: No, handoff not indicated or clinically appropriate    Additional Information:  Writer reached out to the provider requesting for orders. Writer received orders and sent them via [facility] to [facility].     Writer updated nurse on ride time.     Writer attempted to contact Admissions staff for the TCU, but left a VM. Writer attempted to reach a nurse, however, was unable to get a hold on anyone or leave a VM.     Writer completed PCS form.     Patient will discharge from [facility] to [facility] with [facility]:  Stretcher at [phone].  Please refer to  progress notes for further details.No further care management intervention anticipated at this time.  Please re-consult if further needs arise.  Care management signing off.        KATELYN Mccoy  [facility]  Social Work

## 2025-03-01 NOTE — PLAN OF CARE
Goal Outcome Evaluation:  Date & Time: 2/28/25 4510-6433  Summary: Admitted 1/21 for R leg pain and found to have R leg ulcer  1/23 Diagnostic angiogram.   1/24 Thoracentesis.   1/27: Had R common femoral and popliteal tibial endarterectomy, R femoral to popliteal/ tibial PTFE bypass and admitted to ICU for mechanical ventilation.   2/2 Thoracentesis.   2/11 R transmetatarsal amputation.   2/12 tunneled dialysis catheter placed.  2/20 removal of peritoneal dialysis catheter.  Behavior & Aggression: Green  Fall Risk: Yes.   Orientation: A&Ox4  ABNL VS/O2: VSS on 1L except for tachy. Jumps up into the 120s-130s for short periods of times but doesn't sustain - pt states he is just trying to void, tele ST  ABNL Labs: wbc 12.4, hb 8.8  Pain Management: Scheduled Tylenol  Bowel/Bladder: Pt on hemodialysis, produces little urine, urinal at bedside.bladder scanned due to pt not being able to void - scanned for 230 mL   IV/Drains: PIV SL  Diet: Regular w 2 g of potassium  Activity Level: Ax2 Lift, turn and repo as pt allows-refuses  Test/procedures: possible dialysis 3/1? Before discharging to TCU  Discharge: possibly 3/1 if WBC decreases to TCU   Significant Information: pt on dailysis schedule of [facility]. Vascular team changed the groin dressing 2/28

## 2025-03-01 NOTE — PROGRESS NOTES
Date & Time: 0700-2330.  Summary: Admitted 1/21 for R leg pain and found to have R leg ulcer.   1/23 Diagnostic angiogram.   1/24 Thoracentesis.   1/27: Had R common femoral and popliteal tibial endarterectomy, R femoral to popliteal/ tibial PTFE bypass and admitted to ICU for mechanical ventilation.   2/2 Thoracentesis.   2/11 R transmetatarsal amputation.   2/12 tunneled dialysis catheter placed.  2/20 removal of peritoneal dialysis catheter.  Behavior & Aggression: Green.   Fall Risk: Yes.   Orientation: A&Ox4.  ABNL VS/O2:VSS on RA.   ABNL Labs: see chart.  Pain Management: Scheduled Tylenol.  Bowel/Bladder: Pt on hemodialysis, produces little urine, using urinal at bedside.   IV/Drains: PIV SL  Doppler pulses intact.  Diet: Regular 2 g of potassium.  Discharge: possibly tomorrow if WBC decrease.   Activity Level: not OOB, up w/ lift, Ax2w/ cares, T&R.  Significant Information: pt had dialysis today. Vascular team changed the groin dressing today.

## 2025-03-02 ENCOUNTER — LAB REQUISITION (OUTPATIENT)
Dept: LAB | Facility: CLINIC | Age: 65
End: 2025-03-02
Payer: MEDICARE

## 2025-03-02 ENCOUNTER — TELEPHONE (OUTPATIENT)
Dept: GERIATRICS | Facility: CLINIC | Age: 65
End: 2025-03-02
Payer: MEDICARE

## 2025-03-02 ENCOUNTER — DOCUMENTATION ONLY (OUTPATIENT)
Dept: GERIATRICS | Facility: CLINIC | Age: 65
End: 2025-03-02
Payer: MEDICARE

## 2025-03-02 DIAGNOSIS — I48.0 PAROXYSMAL ATRIAL FIBRILLATION (H): ICD-10-CM

## 2025-03-03 ENCOUNTER — DOCUMENTATION ONLY (OUTPATIENT)
Dept: ANTICOAGULATION | Facility: CLINIC | Age: 65
End: 2025-03-03

## 2025-03-03 ENCOUNTER — TELEPHONE (OUTPATIENT)
Dept: CASE MANAGEMENT | Facility: CLINIC | Age: 65
End: 2025-03-03

## 2025-03-03 DIAGNOSIS — Z86.73 H/O: STROKE: Primary | ICD-10-CM

## 2025-03-03 DIAGNOSIS — Z79.01 LONG TERM CURRENT USE OF ANTICOAGULANT THERAPY: ICD-10-CM

## 2025-03-03 PROBLEM — R26.2 DIFFICULTY IN WALKING, NOT ELSEWHERE CLASSIFIED: Status: ACTIVE | Noted: 2024-11-09

## 2025-03-03 PROBLEM — Z74.1 NEED FOR ASSISTANCE WITH PERSONAL CARE: Status: ACTIVE | Noted: 2024-11-09

## 2025-03-03 PROBLEM — T78.40XA ALLERGY, UNSPECIFIED, INITIAL ENCOUNTER: Status: ACTIVE | Noted: 2023-03-31

## 2025-03-03 PROBLEM — E11.40 TYPE 2 DIABETES MELLITUS WITH DIABETIC NEUROPATHY, UNSPECIFIED (H): Status: ACTIVE | Noted: 2024-11-09

## 2025-03-03 PROBLEM — E87.8 OTHER DISORDERS OF ELECTROLYTE AND FLUID BALANCE, NOT ELSEWHERE CLASSIFIED: Status: ACTIVE | Noted: 2025-02-28

## 2025-03-03 PROBLEM — G47.00 INSOMNIA, UNSPECIFIED: Status: ACTIVE | Noted: 2024-11-09

## 2025-03-03 PROBLEM — M62.81 MUSCLE WEAKNESS (GENERALIZED): Status: ACTIVE | Noted: 2024-11-09

## 2025-03-03 PROBLEM — K21.9 GASTRO-ESOPHAGEAL REFLUX DISEASE WITHOUT ESOPHAGITIS: Status: ACTIVE | Noted: 2024-11-09

## 2025-03-03 LAB — INR PPP: 1.93 (ref 0.85–1.15)

## 2025-03-03 PROCEDURE — 36415 COLL VENOUS BLD VENIPUNCTURE: CPT | Mod: ORL | Performed by: FAMILY MEDICINE

## 2025-03-03 PROCEDURE — 85610 PROTHROMBIN TIME: CPT | Mod: ORL | Performed by: FAMILY MEDICINE

## 2025-03-03 PROCEDURE — P9603 ONE-WAY ALLOW PRORATED MILES: HCPCS | Mod: ORL | Performed by: FAMILY MEDICINE

## 2025-03-03 NOTE — PROGRESS NOTES
ANTICOAGULATION  MANAGEMENT: Discharge Review    Arnulfo LAUREN Shreyas chart reviewed for anticoagulation continuity of care    Hospital Admission on -3/1/25 for   Pleural effusion  Cellulitis  Pressure ulcer of foot  Acute respiratory failure with hypoxia (H)  Shock (H)  Toxic metabolic encephalopathy.    Discharge disposition:  Long term care facility  [facility] (SNF)  Services: Skilled Nursing  Address: [address] [place] [id number]  Phone: [phone]    Results:    Recent labs: (last 7 days)     25  0642 25  0703 25  0823 25  0743 25  0745   INR 3.21* 3.29* 3.23* 2.06* 1.87*     Anticoagulation inpatient management:      Restarted warfarin on 25 per vascular  Held warfarin with heparin drip from -25-7.5  2/15-4  16-3  -5  -19-0  - 7.5 mg  - 7.5 mg  - 10 mg  - hold  - 3 mg  -- held  - 2 mg      Anticoagulation discharge instructions:     Warfarin dosin mg e/wed/thurs/sat and sun,  7.5 mon/fri   Bridging: No   INR goal change: No      Medication changes affecting anticoagulation: No, antibiotic completed prior to discharge    Additional factors affecting anticoagulation: Yes: Cellulitis, pressure ulcer     PLAN     No adjustment to anticoagulation plan needed    Recommended follow up is scheduled, Per chart review patient was admitted to [facility] and NP has ordered an INR for today. Anticoagulation clinic to resume management after discharge from TCU    Anticoagulation Calendar updated    Andree Mancia, RN  3/3/2025  Anticoagulation Clinic  [employer] for routing messages: merlin ECHEVERRIA  [facility] patient phone line: [phone]

## 2025-03-03 NOTE — TELEPHONE ENCOUNTER
Received call from Vannesa( [phone]) at [facility] requesting discharge summary to be faxed.   Writer faxed requested document to fax number provided at [phone]    Calli Hyman RN   [facility]   Phone [phone], [employer] or [phone]

## 2025-03-04 ENCOUNTER — DOCUMENTATION ONLY (OUTPATIENT)
Dept: GERIATRICS | Facility: CLINIC | Age: 65
End: 2025-03-04
Payer: MEDICARE

## 2025-03-04 NOTE — PROGRESS NOTES
Physical Therapy Discharge Summary    Reason for therapy discharge:    Discharged to transitional care facility.    Progress towards therapy goal(s). See goals on Care Plan in [facility] electronic health record for goal details.  Goals not met.  Barriers to achieving goals:   discharge from facility.    Therapy recommendation(s):    Continued therapy is recommended.  Rationale/Recommendations:  Pt will benefit from further skilled PT intervention in order to maintain weightbearing restrictions while progressing activity tolerance with transfers, bed mobility, and gait.

## 2025-03-04 NOTE — PLAN OF CARE
Occupational Therapy Discharge Summary    Reason for therapy discharge:    Discharged to transitional care facility.    Progress towards therapy goal(s). See goals on Care Plan in [facility] electronic health record for goal details.  Goals partially met.  Barriers to achieving goals:   discharge from facility.    Therapy recommendation(s):    Continued therapy is recommended.  Rationale/Recommendations:  Pt below baseline, limited by decreased activity tolerance, weakness, pain, TTWB restriction and some cognitive deficits.Recommend TCU at discharge to progress I/ADL independence to PLOF for safe return home or least restrictive environment.

## 2025-03-05 ENCOUNTER — DOCUMENTATION ONLY (OUTPATIENT)
Dept: ANTICOAGULATION | Facility: CLINIC | Age: 65
End: 2025-03-05

## 2025-03-05 PROBLEM — Z99.2 ESRD ON DIALYSIS (H): Status: ACTIVE | Noted: 2025-03-03

## 2025-03-05 PROBLEM — Z90.5 H/O UNILATERAL NEPHRECTOMY: Status: ACTIVE | Noted: 2025-03-03

## 2025-03-05 PROBLEM — E83.52 HYPERCALCEMIA: Status: ACTIVE | Noted: 2025-03-03

## 2025-03-05 PROBLEM — E83.42 HYPOMAGNESEMIA: Status: ACTIVE | Noted: 2025-03-03

## 2025-03-05 PROBLEM — E87.1 HYPONATREMIA: Status: ACTIVE | Noted: 2025-03-04

## 2025-03-05 PROBLEM — C64.9 RENAL CELL CARCINOMA (H): Status: ACTIVE | Noted: 2025-03-03

## 2025-03-05 PROBLEM — E16.2 HYPOGLYCEMIA: Status: ACTIVE | Noted: 2025-03-03

## 2025-03-05 PROBLEM — I73.9 PAD (PERIPHERAL ARTERY DISEASE): Status: ACTIVE | Noted: 2025-03-03

## 2025-03-05 PROBLEM — N18.6 ESRD ON DIALYSIS (H): Status: ACTIVE | Noted: 2025-03-03

## 2025-03-05 PROBLEM — J18.9 PNEUMONIA DUE TO INFECTIOUS ORGANISM: Status: ACTIVE | Noted: 2025-03-03

## 2025-03-05 NOTE — PROGRESS NOTES
ANTICOAGULATION     Arnulfo Pritchett is overdue for an INR check.     Patient is currently inpatient at [facility]. Will postpone reminder one week.    Andree Mancia, RN  3/5/2025  Anticoagulation Clinic  [facility] for routing messages: merlin ECHEVERRIA  [facility] patient phone line: [phone]

## 2025-03-10 ENCOUNTER — PATIENT OUTREACH (OUTPATIENT)
Dept: CARE COORDINATION | Facility: CLINIC | Age: 65
End: 2025-03-10
Payer: MEDICARE

## 2025-03-10 DIAGNOSIS — Z09 HOSPITAL DISCHARGE FOLLOW-UP: ICD-10-CM

## 2025-03-10 ASSESSMENT — ACTIVITIES OF DAILY LIVING (ADL): DEPENDENT_IADLS:: TRANSPORTATION

## 2025-03-10 NOTE — PROGRESS NOTES
Clinic Care Coordination Contact  Care Coordination Transition Communication    Clinical Data: Patient was hospitalized at [facility] from 3/3/2025 to 3/8/2025 with diagnosis of Hypoglycemia.     Assessment: Patient has transitioned to TCU/ARU for short term rehabilitation:    Facility Name: [facility] (p: [phone]  f: [phone])    Transition Communication:  Notified facility of Primary Care- Care Coordination support via Epic fax.    Plan: Care Coordinator will await notification from facility staff informing of patient's discharge plans/needs. Care Coordinator will review chart and outreach to facility staff every 4 weeks and as needed.     Viridiana Ziegler RN Care Coordination   [facility]  Email: [email or web address]  Phone: [phone]

## 2025-03-10 NOTE — LETTER
[facility]   To:             Please give to facility    From:   Viridiana Ziegler  RN  Care Coordinator   [facility]   P: [phone]  [email or web address]   Patient Name:  Arnulfo Pritchett YOB: 1960   Admit date: 3/8/2025      *Information Needed:  Please contact me when the patient will discharge (or if they will move to long term care)- include the discharge date, disposition, and main diagnosis   If the patient is discharged with home care services, please provide the name of the agency    Also- Please inform me if a care conference is being held.   Phone, Fax or Email with information                        Thank you

## 2025-03-11 ENCOUNTER — TELEPHONE (OUTPATIENT)
Dept: INTERNAL MEDICINE | Facility: CLINIC | Age: 65
End: 2025-03-11
Payer: MEDICARE

## 2025-03-11 NOTE — TELEPHONE ENCOUNTER
MTM referral from: Transitions of Care (recent hospital discharge, TCU discharge, or ED visit)    MTM referral outreach attempt #1 on March 11, 2025 at 10:51 AM      Outcome: Patient is not interested at this time because has no questions or concerns with medications at this time    Use vbc for the carrier/Plan on the flowsheet    Winifred Song CMA  MTM

## 2025-03-13 ENCOUNTER — TELEPHONE (OUTPATIENT)
Dept: ANTICOAGULATION | Facility: CLINIC | Age: 65
End: 2025-03-13
Payer: MEDICARE

## 2025-03-13 NOTE — TELEPHONE ENCOUNTER
Called to confirm that Arnulfo is at [facility] (p: [phone] f: [phone]) , being managed by in  house for anticoagulation

## 2025-03-20 ENCOUNTER — TELEPHONE (OUTPATIENT)
Dept: ANTICOAGULATION | Facility: CLINIC | Age: 65
End: 2025-03-20
Payer: MEDICARE

## 2025-03-20 NOTE — TELEPHONE ENCOUNTER
ANTICOAGULATION     Arnulfo Pritchett is overdue for an INR check.     Patient is noted to be in TCU (name/phone): Called to confirm that Arnulfo is at [facility] (p: [phone] . Spoke to Duncan  and verified that patient is still there and managed by in house provider.    Andree Mancia, RN  3/20/2025  Anticoagulation Clinic  [facility] for routing messages: merlin ECHEVERRIA  [facility] patient phone line: [phone]

## 2025-03-24 ENCOUNTER — TELEPHONE (OUTPATIENT)
Dept: PODIATRY | Facility: CLINIC | Age: 65
End: 2025-03-24
Payer: MEDICARE

## 2025-03-24 NOTE — TELEPHONE ENCOUNTER
Other: Dario from the [facility] called they need to make an appointment for Arnulfo for suture removal from a amputation surgery that Dr. Salas did 02/11/25. He came to them from [facility] on 03/08/25 and the PA at there location said that he still has sutures that need to be removed. Please call Dario  to discuss     Could we send this information to you in [facility] or would you prefer to receive a phone call?:   Patient would prefer a phone call   Okay to leave a detailed message?: Yes at Other phone number:  [phone] Dario

## 2025-03-24 NOTE — TELEPHONE ENCOUNTER
[Patient had right TMA on 2/11/25 by Dr. Salas. Patient was last seen by Dr. Salas in the hospital on 2/18/25. He then stayed in the hospital due to other complications and discharged around 3/1/25. He was then re-admitted to [facility] on 3/3/25 and was discharged to the TCU on 3/8/25. Please see note below. Patient still has sutures present.      There aren't any appointments available with Dr. Salas until 4/9/25.   Please advise if patient can be worked in or what is recommended.     ANN-MARIE Roberts RN

## 2025-03-25 ENCOUNTER — TELEPHONE (OUTPATIENT)
Dept: PODIATRY | Facility: CLINIC | Age: 65
End: 2025-03-25
Payer: MEDICARE

## 2025-03-25 DIAGNOSIS — L97.529 TYPE 2 DIABETES MELLITUS WITH LEFT DIABETIC FOOT ULCER (H): Primary | ICD-10-CM

## 2025-03-25 DIAGNOSIS — E11.621 TYPE 2 DIABETES MELLITUS WITH LEFT DIABETIC FOOT ULCER (H): Primary | ICD-10-CM

## 2025-03-25 DIAGNOSIS — L89.899 PRESSURE INJURY OF SKIN OF RIGHT FOOT, UNSPECIFIED INJURY STAGE: ICD-10-CM

## 2025-03-25 NOTE — TELEPHONE ENCOUNTER
Phone call to GABRIELLA Hoyos, to confirm patient is able to transfer on his own.   He spoke with a nurse, and patient needs a ilda lift for transfers. He was informed we are not able to accommodate a ilda lift. Will call him back with further recommendations.     Please advise where patient should be seen for suture removal and TMA follow up due to ilda lift need.     ANN-MARIE Roberts RN

## 2025-03-25 NOTE — TELEPHONE ENCOUNTER
Patient Returning Call    Reason for call:  Jersey from [facility] called requesting for call back asap and to mentioned , unable to remove the switches    Information relayed to patient:  n/a    Patient has additional questions:  No      Could we send this information to you in [facility] or would you prefer to receive a phone call?:   Patient would prefer a phone call   Okay to leave a detailed message?: Yes at Other phone number:  [phone] Jersey

## 2025-03-25 NOTE — TELEPHONE ENCOUNTER
Discussed with Dr. Salas. Ok to give verbal order for sutures of right foot to be removed if they have someone on staff to do it at the TCU. Patient has wound of right heel that no one was really following in the hospital but was just being monitored due to peripheral vascular disease. Patient can be refer to wound care clinic if they do not have wound care staff to manage it. She will place wound care order.     Phone call to ROBER Putnam. They do have a wound care nurse on staff. Verbal order provided to discontinue sutures to right foot. He confirmed they do not have concerns about the incision at this time.   He will check to see if the wound care nurse on site is able to care for patient's heel wound and if she is managing his other wounds of other body parts. He will call back if they need a wound care referral order.     ANN-MARIE Roberts RN

## 2025-03-25 NOTE — TELEPHONE ENCOUNTER
Please also see duplicate encounter dated 3/25/25.     Phone call to GABRIELLA Putnam. He states the sutures are growing into the skin and the nurse manager states they are not able to remove them there. They request a referral to do so.   Jersey asks if the patient's foot can be elevated in the wheelchair to remove the sutures rather than transferring him by ilda since we can't accommodate a ilda. Explained that if patient has any bathroom needs, we are not able to assist with transferring him in our clinic.     Patient is having the TCU nurse treat his other body wounds.     Will discuss with provider and get back with him.     ANN-MARIE Roberts RN

## 2025-03-25 NOTE — TELEPHONE ENCOUNTER
Okay with seeing patient as long as he does not need a ilda or assist with transferring.    GANGA RoseM

## 2025-03-25 NOTE — TELEPHONE ENCOUNTER
Phone call to Shilpa Bishop of [facility]. Number listed is his personal cell.     Patient has dialysis on Monday, Wednesday and Fridays, so needs to be seen on a Tuesday or a Thursday.     He was informed that patient will need to see a different provider. Appointment scheduled for 4/3/25 at 9 am with 8:45 am check in  with Dr. Joseph. Will call him back only if we need to schedule with a different provider.   Inquired if patient was able to transfer on his own, and he states he is.   Also recommended patient have transportation set up to get to the appointment as well and back from the appointment. He verbalized understanding.     Please advise if you are ok seeing this patient of Dr. Marco Antonio Roberts RN

## 2025-03-26 NOTE — TELEPHONE ENCOUNTER
Discussed with supervisor. Patient can be seen but TCU needs to be made aware of ambulatory limitations.     Discussed with Dr. Joseph. She is willing to see patient in clinic so patient doesn't have to change a dialysis day. Ok to use an acute slot on 4/3/25.     Phone call to nurse Fay, at TCU. Explained the above and that we are not able to transfer patient. She states patient usually uses an urinal and it shouldn't be a problem. She transferred writer to JUVENAL Hoyos. Appointment scheduled for 4/3/25 at 10:00 am with 9:45 am check in with Dr. Joseph at the [place] location. Address provided. Asked that they note we are unable to transfer patient and to make sure a return ride is scheduled ahead of time. He verbalized understanding.     ANN-MARIE Roberts RN

## 2025-03-27 ENCOUNTER — TELEPHONE (OUTPATIENT)
Dept: ANTICOAGULATION | Facility: CLINIC | Age: 65
End: 2025-03-27
Payer: MEDICARE

## 2025-03-27 NOTE — TELEPHONE ENCOUNTER
ANTICOAGULATION     Arnulfo Pritchett is overdue for an INR check.     Patient is noted to be in TCU (name/phone): Called to confirm that Arnulfo is at [facility] (p: [phone] . Spoke to Duncan  and verified that patient is still there and managed by in house provider.    Andree Mancia, RN  3/27/2025  Anticoagulation Clinic  [facility] for routing messages: merlin ECHEVERRIA  [facility] patient phone line: [phone]

## 2025-04-03 ENCOUNTER — DOCUMENTATION ONLY (OUTPATIENT)
Dept: ANTICOAGULATION | Facility: CLINIC | Age: 65
End: 2025-04-03

## 2025-04-03 ENCOUNTER — OFFICE VISIT (OUTPATIENT)
Dept: PODIATRY | Facility: CLINIC | Age: 65
End: 2025-04-03
Payer: MEDICARE

## 2025-04-03 VITALS — BODY MASS INDEX: 21.51 KG/M2 | WEIGHT: 163 LBS

## 2025-04-03 DIAGNOSIS — Z99.2 ESRD ON PERITONEAL DIALYSIS (H): ICD-10-CM

## 2025-04-03 DIAGNOSIS — N18.6 ESRD ON PERITONEAL DIALYSIS (H): ICD-10-CM

## 2025-04-03 DIAGNOSIS — Z98.890 POST-OPERATIVE STATE: ICD-10-CM

## 2025-04-03 DIAGNOSIS — L97.312 CHRONIC ULCER OF RIGHT ANKLE WITH FAT LAYER EXPOSED (H): ICD-10-CM

## 2025-04-03 DIAGNOSIS — E11.42 DIABETIC POLYNEUROPATHY ASSOCIATED WITH TYPE 2 DIABETES MELLITUS (H): Primary | ICD-10-CM

## 2025-04-03 DIAGNOSIS — I70.223 CRITICAL LIMB ISCHEMIA OF BOTH LOWER EXTREMITIES (H): ICD-10-CM

## 2025-04-03 DIAGNOSIS — L97.413: ICD-10-CM

## 2025-04-03 DIAGNOSIS — Z89.431 HISTORY OF TRANSMETATARSAL AMPUTATION OF RIGHT FOOT (H): ICD-10-CM

## 2025-04-03 NOTE — PROGRESS NOTES
Podiatry / Foot and Ankle Surgery Progress Note    April 3, 2025    Subject: Patient was seen for suture removal of his right foot.  He underwent surgery with Dr. Salas on February 11.  He is 7 weeks out from a transmetatarsal amputation with her.  He has not had any postop visits since then.  Patient notes that he mostly has numbness.  Is in a transitional care unit at this time.  Denies fever, nausea, vomiting.    Objective:  Vitals: Wt 73.9 kg (163 lb)   BMI 21.51 kg/m    BMI= Body mass index is 21.51 kg/m .    A1C: 6.6 (1/31/2025)    General:  Patient is alert and orientated.  NAD.    Vascular:  DP and PT pulses are nonpalpable.  No edema or varicosities noted.  CFT's >3secs.  Skin temp is cool.    Neuro:  Light and gross touch sensation absent to feet.    Derm: Incisions to the distal transmetatarsal amputation on the right foot are intact.  Eschar along the entire incision.  No purulent drainage, streaking redness or signs of acute infection noted.  Right heel i has large ischemic ulcer which measures approximately 6 cm x 6 cm.  The bone is right there.  Ulceration full-thickness stage III noted to the lateral aspect of the right distal fibula.  This measures approximately 3 cm x 2 cm x 0.3 cm.  Base of the wound is fibrous.  Again no redness or signs of acute infection.    Musculoskeletal:  No foot deformity noted.      Assessment:    Diabetic polyneuropathy associated with type 2 diabetes mellitus (H)  Post-operative state  History of transmetatarsal amputation of right foot (H)  Ischemic heel ulcer, right, with necrosis of muscle (H)  Chronic ulcer of right ankle with fat layer exposed (H)  Critical limb ischemia of both lower extremities (H)  ESRD on peritoneal dialysis (H)      Medical Decision Making/Plan: At this time the sutures were removed.  He still has a significant amount of eschar around the incision due to his poor blood flow.  Would not recommend soaking the foot.  Recommend daily dressing  changes with iodine, 4 x 4 gauze pad gauze roll and tape.  Recommend keeping his heel suspended off of the bed when he is in bed at all times either with a Rooke boot or pillows.  We will have him follow-up in 1 month with Dr. Salas with the wound healing [place].  Patient is at a very high risk for a below the knee or possible above-the-knee amputation on the right foot.      Patient Risk Factor:  Patient is a high risk factor for infection.     All questions were answered to patients satisfaction and they will call with further questions or concerns.    Haley Joseph DPM, Podiatry/Foot and Ankle Surgery

## 2025-04-03 NOTE — PROGRESS NOTES
ANTICOAGULATION     Arnulfo Pritchett is overdue for an INR check.     Per chart review, patient [facility] at [facility].  Will postpone for another week     Last INR check on 4/1/25 with instruction as below     warfarin 5 MG tablet   Indications: Atrial Fibrillation Take by mouth 4/1: Hold; Otherwise 5 mg every Mon, Fri; 2.5 mg all other days or as directed. Next INR due 4/8/25       Asia Eagle RN  4/3/2025  Anticoagulation Clinic  [facility] for routing messages: merlin ECHEVERRIA  [facility] patient phone line: [phone]

## 2025-04-03 NOTE — PATIENT INSTRUCTIONS
Thank you for choosing [facility] Podiatry / Foot & Ankle Surgery!    DR TRONCOSO'S CLINIC:  [facility]   [address]   [place] [address]  [phone]    (Tues, Wed, Thur am, Fri pm)     [facility]  [address], [place] [address]  [phone]  (Every other Friday morning)    [facility]  [address] [place] [id number] [phone]  (Every other Friday)     TRIAGE LINE: [phone]  APPOINTMENTS: [phone]  RADIOLOGY: [phone]  SET UP SURGERY: [phone]  PHYSICAL THERAPY: [phone]   FAX NUMBER: [phone]  BILLING QUESTIONS: [phone]       Follow up: 1 month with Dr. Salas at the Wound Center at [facility].

## 2025-04-03 NOTE — LETTER
4/3/2025      Arnulfo Pritchett  [address] [place] [address] [address]  [facility] [address]      Dear Colleague,    Thank you for referring your patient, Arnulfo Pritchett, to the [facility] PODIATRY. Please see a copy of my visit note below.    Podiatry / Foot and Ankle Surgery Progress Note    April 3, 2025    Subject: Patient was seen for suture removal of his right foot.  He underwent surgery with Dr. Salas on February 11.  He is 7 weeks out from a transmetatarsal amputation with her.  He has not had any postop visits since then.  Patient notes that he mostly has numbness.  Is in a transitional care unit at this time.  Denies fever, nausea, vomiting.    Objective:  Vitals: Wt 73.9 kg (163 lb)   BMI 21.51 kg/m    BMI= Body mass index is 21.51 kg/m .    A1C: 6.6 (1/31/2025)    General:  Patient is alert and orientated.  NAD.    Vascular:  DP and PT pulses are nonpalpable.  No edema or varicosities noted.  CFT's >3secs.  Skin temp is cool.    Neuro:  Light and gross touch sensation absent to feet.    Derm: Incisions to the distal transmetatarsal amputation on the right foot are intact.  Eschar along the entire incision.  No purulent drainage, streaking redness or signs of acute infection noted.  Right heel i has large ischemic ulcer which measures approximately 6 cm x 6 cm.  The bone is right there.  Ulceration full-thickness stage III noted to the lateral aspect of the right distal fibula.  This measures approximately 3 cm x 2 cm x 0.3 cm.  Base of the wound is fibrous.  Again no redness or signs of acute infection.    Musculoskeletal:  No foot deformity noted.      Assessment:    Diabetic polyneuropathy associated with type 2 diabetes mellitus (H)  Post-operative state  History of transmetatarsal amputation of right foot (H)  Ischemic heel ulcer, right, with necrosis of muscle (H)  Chronic ulcer of right ankle with fat layer exposed (H)  Critical limb ischemia of both lower extremities (H)  ESRD on  peritoneal dialysis (H)      Medical Decision Making/Plan: At this time the sutures were removed.  He still has a significant amount of eschar around the incision due to his poor blood flow.  Would not recommend soaking the foot.  Recommend daily dressing changes with iodine, 4 x 4 gauze pad gauze roll and tape.  Recommend keeping his heel suspended off of the bed when he is in bed at all times either with a Rooke boot or pillows.  We will have him follow-up in 1 month with Dr. Salas with the wound healing [place].  Patient is at a very high risk for a below the knee or possible above-the-knee amputation on the right foot.      Patient Risk Factor:  Patient is a high risk factor for infection.     All questions were answered to patients satisfaction and they will call with further questions or concerns.    Haley Joseph DPM, Podiatry/Foot and Ankle Surgery              Again, thank you for allowing me to participate in the care of your patient.        Sincerely,        Haley Joseph DPM, Podiatry/Foot and Ankle Surgery    Electronically signed

## 2025-04-07 ENCOUNTER — PATIENT OUTREACH (OUTPATIENT)
Dept: CARE COORDINATION | Facility: CLINIC | Age: 65
End: 2025-04-07
Payer: MEDICARE

## 2025-04-07 NOTE — PROGRESS NOTES
Clinic Care Coordination Contact    Situation: Patient chart reviewed by care coordinator.    Background: Patient is in identified status. Patient was admitted to the TCU on 3/8/2025.    Assessment: Patient is still in the TCU.     Plan/Recommendations: Care Coordinator will await notification from facility staff informing of patient's discharge plans/needs. Care Coordinator will review chart and outreach to facility staff every 4 weeks and as needed.      Viridiana Ziegler RN Care Coordination   [facility]  Email: [email or web address]  Phone: [phone]

## 2025-04-09 ENCOUNTER — PATIENT OUTREACH (OUTPATIENT)
Dept: CARDIOLOGY | Facility: CLINIC | Age: 65
End: 2025-04-09
Payer: MEDICARE

## 2025-04-09 DIAGNOSIS — I50.22 CHRONIC SYSTOLIC HEART FAILURE (H): Primary | ICD-10-CM

## 2025-04-09 NOTE — LETTER
[facility] - HEART FAILURE CARE COORDINATION  [address]  [facility] [address]    April 14, 2025    Arnulfo Pritchett  [address] [place] [address] [address]  [facility] [address]      Dear Arnulfo,    Our heart failure care coordination team has not been able to reach you since our last contact. We will stop trying to reach you at this time. This does not change any of your scheduled visits. You will still be able to receive care from your providers.     All of us with the heart failure care coordination team are invested in your health. We are here to assist you in meeting your heart failure goals. If you need support from a heart failure care coordinator in the future, please call [phone].    Sincerely,    [facility]- Heart Failure  [email or web address] Care Coordination Nurse Team  Phone: [phone]  Fax: [phone]

## 2025-04-09 NOTE — PROGRESS NOTES
[facility]: Heart Failure Care Coordination   Follow-Up Outreach     Situation/Background:      Chief Complaint   Patient presents with    Clinic Care Coordination - Follow-up     Overdue for CORE     Chart Reviewed:  -- 12/12/24:  CHICO foster/ CORY Guzman in CORE.   Assessment and Plan:  Arnulfo is a very pleasant 64 year old male with past medical history notable for CAD (history of inferior STEMI in 2017 requiring PCI to the RCA; history of PCI to the OM and LCx in 2021; most recent ischemic evaluation with repeat coronary angiogram during his recent admission which showed severe two-vessel coronary artery disease, proximal RCA 70% stenosed and mid circumflex to distal circumflex 80% stenosed--given lack of angina and other acute events conservative approach was recommended; on aspirin, statin, and BB), Severe PAD (hx of left femoral endarterectomy and bovine pericardial patch angioplasty, left distal common femoral arterial/proximal superficial femoral artery and tibioperoneal trunk bypass 2024; hx of SFA popliteal balloon angioplasty and stenting in May 2024; hx of ligation of side branches of the left saphenous vein and temporary closure of the right groin with wound VAC October 2024; Ischemic great left toe status post amputation October 27, 2024  ), ESRD (hx of RCC with nephrectomy; on peritoneal dialysis;fluid status has been managed by nephrology), chronic severe HFrEF/ischemic cardiomyopathy (titration of GDMT has been challenging due to low blood pressures), T2DM, history of CVA, anemia of chronic disease, TALI (compliant with CPAP), GERD, and persistent atrial fibrillation (hx of ablation; rate control; on warfarin).       We are not able to add back Entresto unfortunately due to his low blood pressure.  He continues to be on as needed midodrine.  Unable to be on Jardiance due to poor creatinine clearance.     We will see him back in late January for follow-up.     Assessment:      Attempt #1  on 4/9/25:  Called Arnulfo and left VM requesting call back  to get update on wt, sx, VS.  Also requested he call scheduling dept at [phone] to arrange overdue CORE follow up appt and labs.     Attempt #2 on 4/10/25:  MyChart message sent to pt requesting call back to CORE RN line for update on wt, sx, VS, med review. Requesting he call scheduling to arrange appts.     Recent home weights: unable to assess      Baseline weight: unknown        Remote monitoring: No    CHF assessment:  unable to assess    Intervention/Plan:      Awaiting call back from pt    Updated orders placed for CORE follow up w/ repeat BMP, NT pro BNP.     Messaged scheduling requesting they send overdue appt letter to pt.     Unable to reach pt- RU removed from care teams after multiple attempts made.      No future appointments.      Brook Otto, RN BSN   [facility] [place] [place]  [email or web address].RSandraE. Clinic Care Coordinator  04/09/25, 4:05 PM    [phone]

## 2025-05-05 ENCOUNTER — PATIENT OUTREACH (OUTPATIENT)
Dept: CARE COORDINATION | Facility: CLINIC | Age: 65
End: 2025-05-05
Payer: MEDICARE

## 2025-05-05 NOTE — PROGRESS NOTES
Clinic Care Coordination Contact  Care Coordination Clinician Chart Review    Situation: Patient chart reviewed by care coordinator.    Background: Clinic Care Coordination Referral received from inpatient care team for transition handoff communication following hospital admission.    Assessment: Upon chart review, patient is not a candidate for Primary Care Clinic Care Coordination enrollment due to reason stated below:  Patient has passed away.    Plan/Recommendations: Clinic Care Coordination Referral/order cancelled. RN/SW CC will perform no further monitoring/outreaches at this time and will remain available as needed. If new needs arise, a new Care Coordination Referral may be placed.    Viridiana Ziegler RN Care Coordination   [facility]  Email: [email or web address]  Phone: [phone]
